# Patient Record
Sex: FEMALE | Race: WHITE | NOT HISPANIC OR LATINO | Employment: OTHER | URBAN - METROPOLITAN AREA
[De-identification: names, ages, dates, MRNs, and addresses within clinical notes are randomized per-mention and may not be internally consistent; named-entity substitution may affect disease eponyms.]

---

## 2016-08-09 LAB — HM COLONOSCOPY: NORMAL

## 2017-12-07 LAB — VITAMIN D, 1,25 (OH)2: 32 NG/ML (ref 30–100)

## 2017-12-08 LAB
KAPPA FREE LIGHT CHAINS: 29.5 MG/L (ref 3.3–19.4)
KAPPA/LAMBDA RATIO, S: 1.05 (ref 0.26–1.65)
LAMBDA FREE LIGHT CHAINS: 28.2 MG/L (ref 5.7–26.3)

## 2017-12-15 ENCOUNTER — TELEPHONE (OUTPATIENT)
Dept: INTERNAL MEDICINE | Facility: CLINIC | Age: 67
End: 2017-12-15

## 2017-12-15 DIAGNOSIS — N18.30 CHRONIC KIDNEY DISEASE, STAGE III (MODERATE): Primary | ICD-10-CM

## 2017-12-15 NOTE — TELEPHONE ENCOUNTER
Patient needs monthly H&H check per [facility] lab result. Entered into encounter as standing order, q 4 weeks x12 draws.  Please verify and sign order.  Thank you.

## 2018-01-07 PROBLEM — Z90.49 HISTORY OF CHOLECYSTECTOMY: Status: ACTIVE | Noted: 2018-01-07

## 2018-01-07 PROBLEM — N18.30 CHRONIC KIDNEY DISEASE, STAGE III (MODERATE): Chronic | Status: ACTIVE | Noted: 2018-01-07

## 2018-01-07 PROBLEM — Z85.44 HISTORY OF CANCER OF VULVA: Status: ACTIVE | Noted: 2018-01-07

## 2018-01-07 PROBLEM — I10 ESSENTIAL HYPERTENSION, BENIGN: Chronic | Status: ACTIVE | Noted: 2018-01-07

## 2018-01-07 PROBLEM — K52.9 INFLAMMATORY BOWEL DISEASE: Chronic | Status: ACTIVE | Noted: 2018-01-07

## 2018-01-07 PROBLEM — E11.42 DIABETIC POLYNEUROPATHY ASSOCIATED WITH TYPE 2 DIABETES MELLITUS: Chronic | Status: ACTIVE | Noted: 2018-01-07

## 2018-01-07 PROBLEM — D50.0 IRON DEFICIENCY ANEMIA DUE TO CHRONIC BLOOD LOSS: Chronic | Status: ACTIVE | Noted: 2018-01-07

## 2018-01-07 PROBLEM — E55.9 VITAMIN D DEFICIENCY: Status: ACTIVE | Noted: 2018-01-07

## 2018-01-08 ENCOUNTER — OFFICE VISIT (OUTPATIENT)
Dept: INTERNAL MEDICINE | Facility: CLINIC | Age: 68
End: 2018-01-08
Payer: MEDICARE

## 2018-01-08 VITALS
HEIGHT: 67 IN | RESPIRATION RATE: 16 BRPM | SYSTOLIC BLOOD PRESSURE: 118 MMHG | WEIGHT: 184.63 LBS | HEART RATE: 98 BPM | DIASTOLIC BLOOD PRESSURE: 60 MMHG | BODY MASS INDEX: 28.98 KG/M2 | OXYGEN SATURATION: 98 % | TEMPERATURE: 98 F

## 2018-01-08 DIAGNOSIS — Z11.59 NEED FOR HEPATITIS C SCREENING TEST: ICD-10-CM

## 2018-01-08 DIAGNOSIS — I10 ESSENTIAL HYPERTENSION, BENIGN: Chronic | ICD-10-CM

## 2018-01-08 DIAGNOSIS — E11.42 DIABETIC POLYNEUROPATHY ASSOCIATED WITH TYPE 2 DIABETES MELLITUS: Chronic | ICD-10-CM

## 2018-01-08 DIAGNOSIS — Z90.49 HISTORY OF CHOLECYSTECTOMY: ICD-10-CM

## 2018-01-08 DIAGNOSIS — D50.0 IRON DEFICIENCY ANEMIA DUE TO CHRONIC BLOOD LOSS: Chronic | ICD-10-CM

## 2018-01-08 DIAGNOSIS — R05.8 COUGH PRODUCTIVE OF PURULENT SPUTUM: ICD-10-CM

## 2018-01-08 DIAGNOSIS — Z85.44 HISTORY OF CANCER OF VULVA: ICD-10-CM

## 2018-01-08 DIAGNOSIS — K52.9 INFLAMMATORY BOWEL DISEASE: Chronic | ICD-10-CM

## 2018-01-08 DIAGNOSIS — E55.9 VITAMIN D DEFICIENCY: ICD-10-CM

## 2018-01-08 DIAGNOSIS — N18.5 CHRONIC RENAL FAILURE, STAGE 5: Primary | ICD-10-CM

## 2018-01-08 PROCEDURE — 99214 OFFICE O/P EST MOD 30 MIN: CPT | Mod: ,,, | Performed by: INTERNAL MEDICINE

## 2018-01-08 RX ORDER — GABAPENTIN 400 MG/1
800 CAPSULE ORAL NIGHTLY
COMMUNITY
End: 2018-10-31 | Stop reason: SDUPTHER

## 2018-01-08 RX ORDER — DILTIAZEM HYDROCHLORIDE 240 MG/1
240 CAPSULE, COATED, EXTENDED RELEASE ORAL DAILY
COMMUNITY
End: 2018-01-08 | Stop reason: SDUPTHER

## 2018-01-08 RX ORDER — TEDUGLUTIDE 5 MG/.5ML
INJECTION, POWDER, LYOPHILIZED, FOR SOLUTION SUBCUTANEOUS
COMMUNITY
End: 2019-07-10

## 2018-01-08 RX ORDER — BENZONATATE 100 MG/1
100 CAPSULE ORAL 3 TIMES DAILY PRN
Qty: 30 CAPSULE | Refills: 1 | Status: SHIPPED | OUTPATIENT
Start: 2018-01-08 | End: 2018-01-08 | Stop reason: SDUPTHER

## 2018-01-08 RX ORDER — VITAMIN E 268 MG
400 CAPSULE ORAL 2 TIMES DAILY
COMMUNITY
End: 2018-10-31

## 2018-01-08 RX ORDER — VITAMIN A 3000 MCG
10000 CAPSULE ORAL DAILY
COMMUNITY
End: 2018-10-31

## 2018-01-08 RX ORDER — DILTIAZEM HYDROCHLORIDE 240 MG/1
240 CAPSULE, COATED, EXTENDED RELEASE ORAL DAILY
Qty: 90 CAPSULE | Refills: 0 | Status: SHIPPED | OUTPATIENT
Start: 2018-01-08 | End: 2018-01-08 | Stop reason: SDUPTHER

## 2018-01-08 RX ORDER — BENZONATATE 100 MG/1
100 CAPSULE ORAL 3 TIMES DAILY PRN
Qty: 30 CAPSULE | Refills: 1 | Status: SHIPPED | OUTPATIENT
Start: 2018-01-08 | End: 2018-01-18

## 2018-01-08 RX ORDER — DILTIAZEM HYDROCHLORIDE 240 MG/1
240 CAPSULE, COATED, EXTENDED RELEASE ORAL DAILY
Qty: 90 CAPSULE | Refills: 0 | Status: SHIPPED | OUTPATIENT
Start: 2018-01-08 | End: 2018-08-29 | Stop reason: SDUPTHER

## 2018-01-08 RX ORDER — CHOLECALCIFEROL (VITAMIN D3) 1250 MCG
1 CAPSULE ORAL 3 TIMES DAILY
Refills: 1 | COMMUNITY
Start: 2017-10-05 | End: 2018-01-23 | Stop reason: SDUPTHER

## 2018-01-08 RX ORDER — LEVOTHYROXINE SODIUM 88 UG/1
88 TABLET ORAL DAILY
COMMUNITY
Start: 2017-12-14 | End: 2018-08-30 | Stop reason: SDUPTHER

## 2018-01-08 RX ORDER — CEPHALEXIN 500 MG/1
500 CAPSULE ORAL 4 TIMES DAILY
Qty: 40 CAPSULE | Refills: 1 | Status: SHIPPED | OUTPATIENT
Start: 2018-01-08 | End: 2018-04-11

## 2018-01-08 RX ORDER — DICYCLOMINE HYDROCHLORIDE 10 MG/1
10 CAPSULE ORAL 3 TIMES DAILY
COMMUNITY
End: 2018-08-30 | Stop reason: SDUPTHER

## 2018-01-22 LAB
ALBUMIN SERPL-MCNC: 4 G/DL (ref 3.1–4.7)
ALP SERPL-CCNC: 118 IU/L (ref 40–104)
ALT (SGPT): 12 IU/L (ref 3–33)
AST SERPL-CCNC: 20 IU/L (ref 10–40)
BILIRUB SERPL-MCNC: 0.3 MG/DL (ref 0.3–1)
BUN SERPL-MCNC: 36 MG/DL (ref 8–20)
CALCIUM SERPL-MCNC: 8.7 MG/DL (ref 7.7–10.4)
CHLORIDE: 113 MMOL/L (ref 98–110)
CO2 SERPL-SCNC: 20.3 MMOL/L (ref 22.8–31.6)
CREATININE: 2.68 MG/DL (ref 0.6–1.4)
GLUCOSE: 87 MG/DL (ref 70–99)
PHOSPHATE FLD-MCNC: 4.8 MG/DL (ref 2.5–4.9)
POTASSIUM SERPL-SCNC: 4.1 MMOL/L (ref 3.5–5)
PROT SERPL-MCNC: 6.9 G/DL (ref 6–8.2)
SODIUM: 141 MMOL/L (ref 134–144)
VITAMIN D, 1,25 (OH)2: 27 NG/ML (ref 30–100)

## 2018-01-23 DIAGNOSIS — E55.9 VITAMIN D DEFICIENCY: Primary | ICD-10-CM

## 2018-01-23 LAB — HCV AB SERPL QL IA: <0.1 S/CO RATIO (ref 0–0.9)

## 2018-01-23 RX ORDER — ERGOCALCIFEROL 1.25 MG/1
50000 CAPSULE ORAL
Qty: 12 CAPSULE | Refills: 0 | Status: SHIPPED | OUTPATIENT
Start: 2018-01-23 | End: 2018-10-02 | Stop reason: SDUPTHER

## 2018-02-07 ENCOUNTER — TELEPHONE (OUTPATIENT)
Dept: INTERNAL MEDICINE | Facility: CLINIC | Age: 68
End: 2018-02-07

## 2018-02-07 NOTE — TELEPHONE ENCOUNTER
----- Message from April Horton MD sent at 1/23/2018  7:34 AM CST -----  -IMPORTANT!!!!!!!!!!!    PT CANNOT TAKE THE AMOUNT OF GABAPENTIN SHE IS TAKING--HER RENAL FUNCTION CANNOT TOLERATE THAT DOSE-SHE MUST DROP THE DOSE  MG HS-NO MORE AND WE MUST FOLLOW RENAL FUNCTION WEEKLY-THERE HAS BEEN A SIG DECLINE IN RENAL FUNCTION SINCE November-ALSO, A RACQUEL WAS SENT TO TELL HER TO TAKE VITAMIN D ONCE A WEEK ONLY-THE RECORD REFLECTS TID WHICH I KNOW IS AN ERROR-SHE IS LOW IN VITAMIN D BUT TOO MUCH IS BAD FOR THE KIDNEYS ALSO!!!!!!!!!!!!!!!!!!!!!

## 2018-03-26 ENCOUNTER — TELEPHONE (OUTPATIENT)
Dept: INTERNAL MEDICINE | Facility: CLINIC | Age: 68
End: 2018-03-26

## 2018-03-26 DIAGNOSIS — N18.30 CHRONIC KIDNEY DISEASE, STAGE III (MODERATE): ICD-10-CM

## 2018-03-26 DIAGNOSIS — D50.0 IRON DEFICIENCY ANEMIA DUE TO CHRONIC BLOOD LOSS: ICD-10-CM

## 2018-03-26 DIAGNOSIS — E55.9 VITAMIN D DEFICIENCY: ICD-10-CM

## 2018-03-26 DIAGNOSIS — I10 ESSENTIAL HYPERTENSION, BENIGN: ICD-10-CM

## 2018-03-26 DIAGNOSIS — E11.42 DIABETIC POLYNEUROPATHY ASSOCIATED WITH TYPE 2 DIABETES MELLITUS: Primary | ICD-10-CM

## 2018-03-26 DIAGNOSIS — E03.9 ACQUIRED HYPOTHYROIDISM: ICD-10-CM

## 2018-03-26 DIAGNOSIS — K52.9 INFLAMMATORY BOWEL DISEASE: ICD-10-CM

## 2018-03-26 NOTE — TELEPHONE ENCOUNTER
----- Message from Zahira Bryan sent at 3/26/2018 10:35 AM CDT -----  Contact: self  Pt has an appt on 4/11/18 and would like to have some labs done before she comes.  Has not had labs done in a while.    ordered

## 2018-03-29 LAB
25(OH)D3+25(OH)D2 SERPL-MCNC: 65.9 NG/ML (ref 30–100)
ALBUMIN SERPL-MCNC: 4.1 G/DL (ref 3.6–4.8)
ALBUMIN/GLOB SERPL: 1.6 {RATIO} (ref 1.2–2.2)
ALP SERPL-CCNC: 138 IU/L (ref 39–117)
ALT SERPL-CCNC: 8 IU/L (ref 0–32)
AMBIG ABBREV CMP 14 DEFAULT: NORMAL
AMBIG ABBREV LP DEFAULT: NORMAL
AST SERPL-CCNC: 16 IU/L (ref 0–40)
BILIRUB SERPL-MCNC: 0.3 MG/DL (ref 0–1.2)
BUN SERPL-MCNC: 37 MG/DL (ref 8–27)
BUN/CREAT SERPL: 12 (ref 12–28)
CALCIUM SERPL-MCNC: 9.4 MG/DL (ref 8.7–10.3)
CHLORIDE SERPL-SCNC: 104 MMOL/L (ref 96–106)
CHOLEST SERPL-MCNC: 159 MG/DL (ref 100–199)
CO2 SERPL-SCNC: 18 MMOL/L (ref 18–29)
CREAT SERPL-MCNC: 3.19 MG/DL (ref 0.57–1)
FERRITIN SERPL-MCNC: 75 NG/ML (ref 15–150)
GFR SERPLBLD CREATININE-BSD FMLA CKD-EPI: 14 ML/MIN/1.73
GFR SERPLBLD CREATININE-BSD FMLA CKD-EPI: 17 ML/MIN/1.73
GLOBULIN SER CALC-MCNC: 2.6 G/DL (ref 1.5–4.5)
GLUCOSE SERPL-MCNC: 103 MG/DL (ref 65–99)
HBA1C MFR BLD: 5.3 % (ref 4.8–5.6)
HDLC SERPL-MCNC: 59 MG/DL
IRON SATN MFR SERPL: 14 % (ref 15–55)
IRON SERPL-MCNC: 46 UG/DL (ref 27–139)
LDLC SERPL CALC-MCNC: 73 MG/DL (ref 0–99)
POTASSIUM SERPL-SCNC: 5.3 MMOL/L (ref 3.5–5.2)
PROT SERPL-MCNC: 6.7 G/DL (ref 6–8.5)
SODIUM SERPL-SCNC: 139 MMOL/L (ref 134–144)
TIBC SERPL-MCNC: 340 UG/DL (ref 250–450)
TRIGL SERPL-MCNC: 134 MG/DL (ref 0–149)
TSH SERPL DL<=0.005 MIU/L-ACNC: 0.99 UIU/ML (ref 0.45–4.5)
UIBC SERPL-MCNC: 294 UG/DL (ref 118–369)
VLDLC SERPL CALC-MCNC: 27 MG/DL (ref 5–40)

## 2018-04-11 ENCOUNTER — OFFICE VISIT (OUTPATIENT)
Dept: INTERNAL MEDICINE | Facility: CLINIC | Age: 68
End: 2018-04-11
Payer: MEDICARE

## 2018-04-11 VITALS
SYSTOLIC BLOOD PRESSURE: 110 MMHG | RESPIRATION RATE: 18 BRPM | WEIGHT: 183 LBS | HEIGHT: 67 IN | BODY MASS INDEX: 28.72 KG/M2 | OXYGEN SATURATION: 97 % | HEART RATE: 88 BPM | DIASTOLIC BLOOD PRESSURE: 74 MMHG | TEMPERATURE: 98 F

## 2018-04-11 DIAGNOSIS — I10 ESSENTIAL HYPERTENSION, BENIGN: Chronic | ICD-10-CM

## 2018-04-11 DIAGNOSIS — R53.83 FATIGUE, UNSPECIFIED TYPE: Primary | ICD-10-CM

## 2018-04-11 DIAGNOSIS — R11.0 NAUSEA: ICD-10-CM

## 2018-04-11 DIAGNOSIS — K52.9 INFLAMMATORY BOWEL DISEASE: Chronic | ICD-10-CM

## 2018-04-11 DIAGNOSIS — D50.8 IRON DEFICIENCY ANEMIA SECONDARY TO INADEQUATE DIETARY IRON INTAKE: ICD-10-CM

## 2018-04-11 DIAGNOSIS — R77.8 OTHER SPECIFIED ABNORMALITIES OF PLASMA PROTEINS: ICD-10-CM

## 2018-04-11 DIAGNOSIS — E11.42 DIABETIC POLYNEUROPATHY ASSOCIATED WITH TYPE 2 DIABETES MELLITUS: Chronic | ICD-10-CM

## 2018-04-11 DIAGNOSIS — I95.1 ORTHOSTATIC HYPOTENSION: ICD-10-CM

## 2018-04-11 DIAGNOSIS — R25.2 SPASM: ICD-10-CM

## 2018-04-11 DIAGNOSIS — R53.1 WEAKNESS: ICD-10-CM

## 2018-04-11 LAB — HBA1C MFR BLD: 5.6 %

## 2018-04-11 PROCEDURE — 3044F HG A1C LEVEL LT 7.0%: CPT | Mod: ,,, | Performed by: INTERNAL MEDICINE

## 2018-04-11 PROCEDURE — 3078F DIAST BP <80 MM HG: CPT | Mod: ,,, | Performed by: INTERNAL MEDICINE

## 2018-04-11 PROCEDURE — 83036 HEMOGLOBIN GLYCOSYLATED A1C: CPT | Mod: QW,,, | Performed by: INTERNAL MEDICINE

## 2018-04-11 PROCEDURE — 3074F SYST BP LT 130 MM HG: CPT | Mod: ,,, | Performed by: INTERNAL MEDICINE

## 2018-04-11 PROCEDURE — 99214 OFFICE O/P EST MOD 30 MIN: CPT | Mod: ,,, | Performed by: INTERNAL MEDICINE

## 2018-04-11 RX ORDER — PROMETHAZINE HYDROCHLORIDE 25 MG/1
25 TABLET ORAL DAILY PRN
COMMUNITY
End: 2019-01-07 | Stop reason: SDUPTHER

## 2018-04-11 RX ORDER — DEXTROSE 4 G
TABLET,CHEWABLE ORAL
Qty: 1 EACH | Refills: 0 | Status: SHIPPED | OUTPATIENT
Start: 2018-04-11 | End: 2019-08-05

## 2018-04-11 RX ORDER — BLOOD-GLUCOSE CONTROL, NORMAL
EACH MISCELLANEOUS 3 TIMES DAILY
COMMUNITY
End: 2019-08-05

## 2018-04-11 RX ORDER — CYCLOBENZAPRINE HCL 10 MG
10 TABLET ORAL 2 TIMES DAILY
COMMUNITY
End: 2018-08-30 | Stop reason: SDUPTHER

## 2018-04-11 RX ORDER — POTASSIUM CHLORIDE 750 MG/1
10 TABLET, EXTENDED RELEASE ORAL ONCE
COMMUNITY
End: 2018-08-30 | Stop reason: SDUPTHER

## 2018-04-11 RX ORDER — FERROUS SULFATE 325(65) MG
325 TABLET ORAL
COMMUNITY
End: 2018-05-23

## 2018-04-11 NOTE — PROGRESS NOTES
"  SUBJECTIVE:    Patient ID: Shara Hutchins is a 68 y.o. female.    Chief Complaint: Fatigue; Weight Gain; Nausea (very easily); and Results (labs in epic)    HPI    Patient comes for recheck-she is "totally exhausted"-yesterday she got nauseated smelling eggs-went to a meeting and two women ran up to her -she was shaking-face was gray-no one could understand what she was saying-she felt disoriented-she drank some cold water-pt says she drinks constantly-90 % of the time it is water-she was totally dehydrated-drank a whole gallon of water in an hour-she was not in control of herself-after 2/3 of a gallon she felt better!!!-she drove herself home-this has happened two or three times-usually lays down until it passes-blood sugar is going "viktoriya high" now-last two weeks--hx Diabetes with neuropathy--no pancreatitis but strong family hx of pancreatitis--    Since her last visit she has seen Dr Dietz -her GFR is down to 14-she is going to have some labs for renal again next week-[facility]    We found normal serum protein level  and increased  kappa and lambda chains--     Diabetes-200 one night-in am awakening 130--A1C is 5.6  Past Medical History:   Diagnosis Date    Chronic kidney disease, stage III (moderate) 1/7/2018     Social History     Social History    Marital status:      Spouse name: N/A    Number of children: N/A    Years of education: N/A     Occupational History    Not on file.     Social History Main Topics    Smoking status: Former Smoker     Packs/day: 1.00     Years: 33.00     Types: Cigarettes     Quit date: 7/22/2000    Smokeless tobacco: Never Used    Alcohol use No    Drug use: No    Sexual activity: Not on file     Other Topics Concern    Not on file     Social History Narrative    No narrative on file     Past Surgical History:   Procedure Laterality Date    ABDOMINAL SURGERY      BACK SURGERY      CERVICAL FUSION      x 4    cervix repair      CHOLECYSTECTOMY      " HYSTERECTOMY      LUMBAR LAMINECTOMY      NECK SURGERY      SHOULDER SURGERY      vulva cancer       Family History   Problem Relation Age of Onset    Heart disease Mother     Hypertension Mother     Cancer Mother      lung    Heart disease Father     No Known Problems Sister      2    No Known Problems Daughter        Review of Systems   Constitutional: Positive for appetite change (significantly reduced) and unexpected weight change (gaining weight despite reduction in appetite). Negative for chills, diaphoresis, fatigue and fever.   HENT: Negative for congestion, ear pain, hearing loss, nosebleeds, postnasal drip, sinus pain, sinus pressure, sneezing, sore throat, tinnitus, trouble swallowing and voice change.    Eyes: Negative for photophobia, pain, itching and visual disturbance.   Respiratory: Negative for apnea, cough (productive cough of colored mucous), chest tightness, shortness of breath, wheezing and stridor.    Cardiovascular: Negative for chest pain, palpitations and leg swelling.   Gastrointestinal: Negative for abdominal distention, abdominal pain, blood in stool, constipation, diarrhea, nausea (constantly) and vomiting.   Endocrine: Negative for cold intolerance, heat intolerance, polydipsia and polyuria.   Genitourinary: Negative for difficulty urinating, dyspareunia, dysuria, flank pain, frequency, hematuria, menstrual problem, pelvic pain, urgency, vaginal discharge and vaginal pain.   Musculoskeletal: Negative for arthralgias, back pain, joint swelling, myalgias, neck pain and neck stiffness.   Skin: Negative for pallor.        Mild dehydration   Allergic/Immunologic: Negative for environmental allergies and food allergies.   Neurological: Positive for weakness (generalized with near faints). Negative for dizziness, tremors, speech difficulty, light-headedness and numbness (constantly).   Hematological: Does not bruise/bleed easily.   Psychiatric/Behavioral: Negative for agitation,  confusion, decreased concentration, sleep disturbance and suicidal ideas. The patient is not nervous/anxious.           Objective:      Physical Exam   Constitutional: She is oriented to person, place, and time. She appears well-developed and well-nourished. She is cooperative. No distress.   Looks fatigued   HENT:   Head: Normocephalic and atraumatic.   Right Ear: Tympanic membrane normal.   Left Ear: Tympanic membrane normal.   Mouth/Throat: Uvula is midline and mucous membranes are normal.   Eyes: Conjunctivae, EOM and lids are normal. Pupils are equal, round, and reactive to light. Right pupil is round and reactive. Left pupil is round and reactive.   Neck: Trachea normal and normal range of motion. Neck supple. No JVD present. No thyromegaly present.   Cardiovascular: Normal rate, regular rhythm, normal heart sounds and intact distal pulses.    Pulmonary/Chest: Effort normal and breath sounds normal. No tachypnea. No respiratory distress.   Abdominal: Soft. Bowel sounds are normal. There is no tenderness (epigastrium is tender and BS are louder and more constant in that area).   Musculoskeletal: Normal range of motion.   Lymphadenopathy:     She has no cervical adenopathy.   Neurological: She is alert and oriented to person, place, and time. She has normal strength.   Skin: Skin is warm and dry. No rash noted. There is pallor (extreme pallor of skin but not of lowere lids).   Psychiatric: She has a normal mood and affect. Her speech is normal.   Depressed affect   Nursing note and vitals reviewed.          Assessment:       1. Fatigue, unspecified type    2. Orthostatic hypotension    3. Weakness    4. Essential hypertension, benign    5. Inflammatory bowel disease    6. Diabetic polyneuropathy associated with type 2 diabetes mellitus    7. Other specified abnormalities of plasma proteins    8. Nausea    9. Spasm    10. Iron deficiency anemia secondary to inadequate dietary iron intake         Plan:            Fatigue, unspecified type  -     Ambulatory referral to Hematology / Oncology  -     Amylase; Future; Expected date: 04/11/2018  -     Lipase; Future; Expected date: 04/11/2018  -     Comprehensive metabolic panel; Future; Expected date: 04/11/2018  -     Lactic acid, plasma; Future; Expected date: 04/11/2018    Orthostatic hypotension    Weakness  -     Ambulatory referral to Hematology / Oncology  -     Lipase; Future; Expected date: 04/11/2018  -     Comprehensive metabolic panel; Future; Expected date: 04/11/2018  -     Lactic acid, plasma; Future; Expected date: 04/11/2018    Essential hypertension, benign  -     Microalbumin/creatinine urine ratio  -     Comprehensive metabolic panel; Future; Expected date: 04/11/2018    Inflammatory bowel disease  -     Comprehensive metabolic panel; Future; Expected date: 04/11/2018    Diabetic polyneuropathy associated with type 2 diabetes mellitus  -     Hemoglobin A1c; Future; Expected date: 04/12/2018  -     Microalbumin/creatinine urine ratio  -     Lactic acid, plasma; Future; Expected date: 04/11/2018    Other specified abnormalities of plasma proteins  -     Ambulatory referral to Hematology / Oncology  -     Comprehensive metabolic panel; Future; Expected date: 04/11/2018  -     Lactic acid, plasma; Future; Expected date: 04/11/2018    Nausea  -     Amylase; Future; Expected date: 04/11/2018  -     Lipase; Future; Expected date: 04/11/2018  -     Comprehensive metabolic panel; Future; Expected date: 04/11/2018  -     Lactic acid, plasma; Future; Expected date: 04/11/2018    Spasm    Iron deficiency anemia secondary to inadequate dietary iron intake

## 2018-04-11 NOTE — PATIENT INSTRUCTIONS
We will wait for the labs Renal and I are getting next week in order to determine what to do re the orthostatic hypotension and whatever direction we need to take for this patient-    Await Heme eval of kappa and lambda chain proteins

## 2018-04-23 LAB
ALBUMIN SERPL-MCNC: 4 G/DL (ref 3.6–4.8)
ALBUMIN/GLOB SERPL: 1.7 {RATIO} (ref 1.2–2.2)
ALP SERPL-CCNC: 114 IU/L (ref 39–117)
ALT SERPL-CCNC: 9 IU/L (ref 0–32)
AMBIG ABBREV CMP 14 DEFAULT: NORMAL
AMYLASE SERPL-CCNC: 103 U/L (ref 31–124)
AST SERPL-CCNC: 13 IU/L (ref 0–40)
BILIRUB SERPL-MCNC: 0.2 MG/DL (ref 0–1.2)
BUN SERPL-MCNC: 38 MG/DL (ref 8–27)
BUN/CREAT SERPL: 12 (ref 12–28)
CALCIUM SERPL-MCNC: 8.5 MG/DL (ref 8.7–10.3)
CHLORIDE SERPL-SCNC: 108 MMOL/L (ref 96–106)
CO2 SERPL-SCNC: 16 MMOL/L (ref 18–29)
CREAT SERPL-MCNC: 3.09 MG/DL (ref 0.57–1)
GFR SERPLBLD CREATININE-BSD FMLA CKD-EPI: 15 ML/MIN/1.73
GFR SERPLBLD CREATININE-BSD FMLA CKD-EPI: 17 ML/MIN/1.73
GLOBULIN SER CALC-MCNC: 2.3 G/DL (ref 1.5–4.5)
GLUCOSE SERPL-MCNC: 96 MG/DL (ref 65–99)
HBA1C MFR BLD: 5.3 % (ref 4.8–5.6)
LACTATE SERPL-MCNC: 6.1 MG/DL (ref 4.8–25.7)
LIPASE SERPL-CCNC: 41 U/L (ref 14–72)
POTASSIUM SERPL-SCNC: 5 MMOL/L (ref 3.5–5.2)
PROT SERPL-MCNC: 6.3 G/DL (ref 6–8.5)
SODIUM SERPL-SCNC: 141 MMOL/L (ref 134–144)

## 2018-04-30 ENCOUNTER — TELEPHONE (OUTPATIENT)
Dept: INTERNAL MEDICINE | Facility: CLINIC | Age: 68
End: 2018-04-30

## 2018-04-30 NOTE — TELEPHONE ENCOUNTER
----- Message from Jackie Acosta sent at 4/26/2018  5:17 PM CDT -----  Contact: Bindu  From [facility]:  Kidney Dr wants her to take B12. He wants her to have that done in your office. Also he cut her BP med in half. Please call.

## 2018-04-30 NOTE — TELEPHONE ENCOUNTER
----- Message from April Horton MD sent at 4/29/2018  9:38 PM CDT -----  Please call the patient regarding her abnormal result.-WORSENING RENAL FUNTION WITH SOME METABOLIC CONSEQUENCES-WHO WD SHE LIKE TO SEE

## 2018-04-30 NOTE — TELEPHONE ENCOUNTER
Patient is already seeing a doctor for her renal function and will have the kidney doctor fax his last office visit to us so that we can get the b-12 figured out

## 2018-04-30 NOTE — TELEPHONE ENCOUNTER
Kidney doctor stated he wanted 1 mcg once a day for 7 days then 1 mcg monthly 6 months. Patient stated that we need to regulate it.    BP medication is cut in half.    Gave patient a message to f/u with the referral with Dr Frank.

## 2018-05-08 DIAGNOSIS — E53.8 VITAMIN B12 DEFICIENCY: Primary | ICD-10-CM

## 2018-05-08 NOTE — TELEPHONE ENCOUNTER
Spoke to Patient.  Lab is under media tab as Media Information  File Link     Scan on 4/26/2018  4:36 PM : LAB, [facility], (CMP, FE, VITAMIN B12) 04/20/2018Scan on 4/26/2018  4:36 PM : [facility], [facility], (CMP, FE, VITAMIN B12) 04/20/2018   Key Information     Document ID File Type Document Type Description   [id number] Image Outside Lab [facility], (CMP, FE, VITAMIN B12) 04/20/2018   Import Information     Attached At Date Time User Dept   Patient Level 4/26/2018  4:36 PM       B12 level was below 150.  If prescribed, patient would like syringes sent to pharmacy so she can self-administer.  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

## 2018-05-08 NOTE — TELEPHONE ENCOUNTER
----- Message from Jackie Acosta sent at 5/8/2018  3:57 PM CDT -----  Contact: Shara Dietz's office was supposed to have sent over labs & orders for B12 over 2 weeks ago, patient is frustrated and wants someone to call her asap

## 2018-05-09 RX ORDER — CYANOCOBALAMIN 1000 UG/ML
2000 INJECTION, SOLUTION INTRAMUSCULAR; SUBCUTANEOUS WEEKLY
Qty: 24 ML | Refills: 0 | Status: SHIPPED | OUTPATIENT
Start: 2018-05-09 | End: 2018-08-06 | Stop reason: SDUPTHER

## 2018-05-09 NOTE — TELEPHONE ENCOUNTER
Ok-pend order-also she is to give herself 2cc weekly-also ck monthly B12-also print labs so I can discuss with her andreea

## 2018-05-10 ENCOUNTER — TELEPHONE (OUTPATIENT)
Dept: INTERNAL MEDICINE | Facility: CLINIC | Age: 68
End: 2018-05-10

## 2018-05-10 NOTE — TELEPHONE ENCOUNTER
----- Message from April Horton MD sent at 5/10/2018  7:53 AM CDT -----  Test results-potassium 5.3-stp potassium and recheck it in two weeks-Creat 3.19-remnains iron deficient

## 2018-05-23 ENCOUNTER — OFFICE VISIT (OUTPATIENT)
Dept: INTERNAL MEDICINE | Facility: CLINIC | Age: 68
End: 2018-05-23
Payer: MEDICARE

## 2018-05-23 VITALS
HEART RATE: 66 BPM | RESPIRATION RATE: 18 BRPM | TEMPERATURE: 98 F | DIASTOLIC BLOOD PRESSURE: 74 MMHG | BODY MASS INDEX: 29.66 KG/M2 | OXYGEN SATURATION: 97 % | HEIGHT: 67 IN | SYSTOLIC BLOOD PRESSURE: 122 MMHG | WEIGHT: 189 LBS

## 2018-05-23 DIAGNOSIS — E11.22 DM TYPE 2 CAUSING CKD STAGE 5: ICD-10-CM

## 2018-05-23 DIAGNOSIS — E87.20 COMPENSATED METABOLIC ACIDOSIS: ICD-10-CM

## 2018-05-23 DIAGNOSIS — J18.9 PNEUMONIA OF LEFT LOWER LOBE DUE TO INFECTIOUS ORGANISM: ICD-10-CM

## 2018-05-23 DIAGNOSIS — R05.8 PRODUCTIVE COUGH: ICD-10-CM

## 2018-05-23 DIAGNOSIS — N18.5 DM TYPE 2 CAUSING CKD STAGE 5: ICD-10-CM

## 2018-05-23 DIAGNOSIS — J01.40 SUBACUTE PANSINUSITIS: ICD-10-CM

## 2018-05-23 DIAGNOSIS — R25.2 LEG CRAMPS: Primary | ICD-10-CM

## 2018-05-23 PROCEDURE — 3044F HG A1C LEVEL LT 7.0%: CPT | Mod: ,,, | Performed by: INTERNAL MEDICINE

## 2018-05-23 PROCEDURE — 99214 OFFICE O/P EST MOD 30 MIN: CPT | Mod: ,,, | Performed by: INTERNAL MEDICINE

## 2018-05-23 PROCEDURE — 3074F SYST BP LT 130 MM HG: CPT | Mod: ,,, | Performed by: INTERNAL MEDICINE

## 2018-05-23 PROCEDURE — 1111F DSCHRG MED/CURRENT MED MERGE: CPT | Mod: ,,, | Performed by: INTERNAL MEDICINE

## 2018-05-23 PROCEDURE — 3078F DIAST BP <80 MM HG: CPT | Mod: ,,, | Performed by: INTERNAL MEDICINE

## 2018-05-23 RX ORDER — LANOLIN ALCOHOL/MO/W.PET/CERES
400 CREAM (GRAM) TOPICAL DAILY
Qty: 90 TABLET | Refills: 1 | COMMUNITY
Start: 2018-05-23 | End: 2018-10-02 | Stop reason: SDUPTHER

## 2018-05-23 RX ORDER — PREDNISONE 10 MG/1
10 TABLET ORAL
COMMUNITY
End: 2018-10-02

## 2018-05-23 RX ORDER — FLUCONAZOLE 200 MG/1
100 TABLET ORAL DAILY
COMMUNITY
End: 2019-01-07

## 2018-05-23 RX ORDER — LANOLIN ALCOHOL/MO/W.PET/CERES
400 CREAM (GRAM) TOPICAL DAILY
COMMUNITY
End: 2018-05-23 | Stop reason: SDUPTHER

## 2018-05-23 RX ORDER — HYDROCODONE POLISTIREX AND CHLORPHENIRAMINE POLISTIREX 10; 8 MG/5ML; MG/5ML
5 SUSPENSION, EXTENDED RELEASE ORAL EVERY 12 HOURS PRN
Qty: 100 ML | Refills: 0 | Status: SHIPPED | OUTPATIENT
Start: 2018-05-23 | End: 2018-10-02

## 2018-05-23 NOTE — PROGRESS NOTES
SUBJECTIVE:    Patient ID: Shara Hutchins is a 68 y.o. female.    Chief Complaint: Follow-up ([facility]) and Chronic Kidney Disease    HPI     Hospitalized with fever and pneumonia and shortness of breath-CT showed 3 areas of consolidation and of course the renal disease-continues with sinus congestion and problems sleeping-it was felt she may need oxygen but she did not have to-so much drainage she can not lay down-Pulmonary wanted her discharged on robitussin with codeine    Diabetes-for now because of steroids all is still well-AC was 5.6    Diarrhea-finally with Nancy ex she is better    (MED RECONCILIATION) BP Rx decreased by half-see Rx changes    B is really helping with her energy--    Past Medical History:   Diagnosis Date    Chronic kidney disease, stage III (moderate) 1/7/2018    Diabetes mellitus, type 2     Fatigue     Hypertension      Social History     Social History    Marital status:      Spouse name: N/A    Number of children: N/A    Years of education: N/A     Occupational History    Not on file.     Social History Main Topics    Smoking status: Former Smoker     Packs/day: 1.00     Years: 33.00     Types: Cigarettes     Quit date: 7/22/2000    Smokeless tobacco: Never Used    Alcohol use No    Drug use: No    Sexual activity: Not on file     Other Topics Concern    Not on file     Social History Narrative    No narrative on file     Past Surgical History:   Procedure Laterality Date    ABDOMINAL SURGERY      BACK SURGERY      CERVICAL FUSION      x 4    cervix repair      CHOLECYSTECTOMY      HYSTERECTOMY      LUMBAR LAMINECTOMY      NECK SURGERY      SHOULDER SURGERY      vulva cancer       Family History   Problem Relation Age of Onset    Heart disease Mother     Hypertension Mother     Cancer Mother         lung    Heart disease Father     No Known Problems Sister         2    No Known Problems Daughter        Review of Systems   Constitutional: Negative for  appetite change (increased with steroids), chills, diaphoresis, fatigue, fever and unexpected weight change.   HENT: Negative for congestion (4 + sinus congestion), ear pain, hearing loss, nosebleeds, postnasal drip, sinus pain, sinus pressure, sneezing, sore throat, tinnitus, trouble swallowing and voice change.    Eyes: Negative for photophobia, pain, itching and visual disturbance.   Respiratory: Positive for shortness of breath (due to congestion). Negative for apnea, cough, chest tightness, wheezing and stridor.    Cardiovascular: Negative for chest pain, palpitations and leg swelling.   Gastrointestinal: Negative for abdominal distention, abdominal pain, blood in stool, constipation, diarrhea, nausea and vomiting.   Endocrine: Negative for cold intolerance, heat intolerance, polydipsia and polyuria.   Genitourinary: Negative for difficulty urinating, dyspareunia, dysuria, flank pain, frequency, hematuria, menstrual problem, pelvic pain, urgency, vaginal discharge and vaginal pain.   Musculoskeletal: Negative for arthralgias, back pain, joint swelling, myalgias (muscle spasms), neck pain and neck stiffness.   Skin: Negative for pallor.   Allergic/Immunologic: Negative for environmental allergies and food allergies.   Neurological: Positive for headaches (with sinuses but they are not a problem at this time). Negative for dizziness, tremors, speech difficulty, weakness, light-headedness and numbness.   Hematological: Does not bruise/bleed easily.   Psychiatric/Behavioral: Negative for agitation, confusion, decreased concentration, sleep disturbance (due to cough) and suicidal ideas. The patient is not nervous/anxious.         Protective Sensation (aw/ 10 gram monofilament):  Right: Intact  Left: Intact    Visual Inspection:  Normal -  Bilateral    Pedal Pulses:   Right: Present  Left: Present    Posterior tibialis:   Right:Present  Left: Present    Objective:      Physical Exam   Constitutional: She is oriented  to person, place, and time. She appears well-developed and well-nourished. She is cooperative. No distress.   HENT:   Head: Normocephalic and atraumatic.   Right Ear: Tympanic membrane normal.   Left Ear: Tympanic membrane normal.   Nose: Nose normal.   Mouth/Throat: Uvula is midline, oropharynx is clear and moist and mucous membranes are normal.   Eyes: Conjunctivae, EOM and lids are normal. Pupils are equal, round, and reactive to light. Right pupil is round and reactive. Left pupil is round and reactive.   Neck: Trachea normal and normal range of motion. Neck supple. No JVD present. No thyromegaly present.   Cardiovascular: Normal rate, regular rhythm, normal heart sounds and intact distal pulses.    Pulmonary/Chest: Effort normal and breath sounds normal. No tachypnea. No respiratory distress.   Abdominal: Soft. Bowel sounds are normal. There is no tenderness.   Musculoskeletal: Normal range of motion.   Lymphadenopathy:     She has no cervical adenopathy.   Neurological: She is alert and oriented to person, place, and time. She has normal strength.   Skin: Skin is warm and dry. No rash noted.   Psychiatric: She has a normal mood and affect. Her speech is normal.   Nursing note and vitals reviewed.          Assessment:       1. Leg cramps    2. Pneumonia of left lower lobe due to infectious organism    3. Subacute pansinusitis    4. Productive cough    5. 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 Compensated metabolic acidosis    7. BMI 29.0-29.9,adult         Plan:           Leg cramps  -     magnesium oxide (MAG-OX) 400 mg tablet; Take 1 tablet (400 mg total) by mouth once daily.  Dispense: 90 tablet; Refill: 1    Pneumonia of left lower lobe due to infectious organism  -     hydrocodone-chlorpheniramine (TUSSIGON) 10-8 mg/5 mL suspension; Take 5 mL's by mouth every 12 (twelve) hours as needed for Cough.  Dispense: 100 mL; Refill: 0    Subacute pansinusitis        -     Continue Rx    Productive cough        -       Hydrocodone with chlorpheniramine 100 cc one teaspoon bid for cough    DM type 2 causing CCK stage 5        -     Continue as she is    Compensated metabolic acidosis        -     Per Renal  BMI 29.0-29.9,adult

## 2018-05-31 ENCOUNTER — TELEPHONE (OUTPATIENT)
Dept: INTERNAL MEDICINE | Facility: CLINIC | Age: 68
End: 2018-05-31

## 2018-06-20 ENCOUNTER — TELEPHONE (OUTPATIENT)
Dept: INTERNAL MEDICINE | Facility: CLINIC | Age: 68
End: 2018-06-20

## 2018-06-20 NOTE — TELEPHONE ENCOUNTER
----- Message from Zahira Bryan sent at 6/18/2018  5:04 PM CDT -----  Contact: [employer]  CALLED TO ASK IF OUR OFFICE WOULD LIKE TO FILE AN APPEAL.

## 2018-07-10 ENCOUNTER — TELEPHONE (OUTPATIENT)
Dept: INTERNAL MEDICINE | Facility: CLINIC | Age: 68
End: 2018-07-10

## 2018-07-10 NOTE — TELEPHONE ENCOUNTER
----- Message from April Horton MD sent at 7/10/2018  2:51 PM CDT -----  Inform pt despite my dx they refuse to pay for B12  ----- Message -----  From: Denise Baron LPN  Sent: 7/9/2018   8:32 AM  To: April Horton MD    Appeal for B12 shots denied  ----- Message -----  From: Stephanie Gordon  Sent: 7/6/2018  10:37 AM  To: [facility] Staff    Prior authorization, denial, 07/06/18

## 2018-08-06 DIAGNOSIS — E53.8 VITAMIN B12 DEFICIENCY: ICD-10-CM

## 2018-08-06 RX ORDER — CYANOCOBALAMIN 1000 UG/ML
2000 INJECTION, SOLUTION INTRAMUSCULAR; SUBCUTANEOUS WEEKLY
Qty: 24 ML | Refills: 0 | Status: SHIPPED | OUTPATIENT
Start: 2018-08-06 | End: 2018-08-29 | Stop reason: SDUPTHER

## 2018-08-29 ENCOUNTER — PATIENT MESSAGE (OUTPATIENT)
Dept: FAMILY MEDICINE | Facility: CLINIC | Age: 68
End: 2018-08-29

## 2018-08-29 DIAGNOSIS — E53.8 VITAMIN B12 DEFICIENCY: ICD-10-CM

## 2018-08-29 DIAGNOSIS — I10 ESSENTIAL HYPERTENSION, BENIGN: Chronic | ICD-10-CM

## 2018-08-29 RX ORDER — CYANOCOBALAMIN 1000 UG/ML
2000 INJECTION, SOLUTION INTRAMUSCULAR; SUBCUTANEOUS WEEKLY
Qty: 24 ML | Refills: 0 | Status: SHIPPED | OUTPATIENT
Start: 2018-08-29 | End: 2018-10-31

## 2018-08-29 RX ORDER — DILTIAZEM HYDROCHLORIDE 240 MG/1
240 CAPSULE, COATED, EXTENDED RELEASE ORAL DAILY
Qty: 90 CAPSULE | Refills: 0 | Status: SHIPPED | OUTPATIENT
Start: 2018-08-29 | End: 2018-11-29 | Stop reason: SDUPTHER

## 2018-08-30 DIAGNOSIS — R25.2 SPASM: ICD-10-CM

## 2018-08-30 DIAGNOSIS — I10 ESSENTIAL HYPERTENSION, BENIGN: Chronic | ICD-10-CM

## 2018-08-30 RX ORDER — CYCLOBENZAPRINE HCL 10 MG
10 TABLET ORAL 2 TIMES DAILY
Qty: 180 TABLET | Refills: 1 | Status: SHIPPED | OUTPATIENT
Start: 2018-08-30 | End: 2018-10-31 | Stop reason: SDUPTHER

## 2018-08-30 RX ORDER — LEVOTHYROXINE SODIUM 88 UG/1
88 TABLET ORAL DAILY
Qty: 90 TABLET | Refills: 1 | Status: SHIPPED | OUTPATIENT
Start: 2018-08-30 | End: 2019-04-04 | Stop reason: SDUPTHER

## 2018-08-30 RX ORDER — DICYCLOMINE HYDROCHLORIDE 10 MG/1
10 CAPSULE ORAL 3 TIMES DAILY
Qty: 90 CAPSULE | Refills: 2 | Status: SHIPPED | OUTPATIENT
Start: 2018-08-30 | End: 2018-10-31 | Stop reason: SDUPTHER

## 2018-08-30 RX ORDER — POTASSIUM CHLORIDE 750 MG/1
10 TABLET, EXTENDED RELEASE ORAL ONCE
Qty: 1 TABLET | Refills: 0 | Status: SHIPPED | OUTPATIENT
Start: 2018-08-30 | End: 2018-08-30

## 2018-09-06 ENCOUNTER — TELEPHONE (OUTPATIENT)
Dept: FAMILY MEDICINE | Facility: CLINIC | Age: 68
End: 2018-09-06

## 2018-09-10 ENCOUNTER — TELEPHONE (OUTPATIENT)
Dept: FAMILY MEDICINE | Facility: CLINIC | Age: 68
End: 2018-09-10

## 2018-10-01 RX ORDER — POTASSIUM CHLORIDE 20 MEQ/15ML
20 SOLUTION ORAL DAILY
COMMUNITY
Start: 2018-08-30 | End: 2019-01-07 | Stop reason: SDUPTHER

## 2018-10-02 ENCOUNTER — OFFICE VISIT (OUTPATIENT)
Dept: FAMILY MEDICINE | Facility: CLINIC | Age: 68
End: 2018-10-02
Payer: MEDICARE

## 2018-10-02 VITALS
RESPIRATION RATE: 16 BRPM | HEIGHT: 67 IN | HEART RATE: 80 BPM | SYSTOLIC BLOOD PRESSURE: 136 MMHG | OXYGEN SATURATION: 96 % | WEIGHT: 186 LBS | DIASTOLIC BLOOD PRESSURE: 80 MMHG | BODY MASS INDEX: 29.19 KG/M2 | TEMPERATURE: 98 F

## 2018-10-02 DIAGNOSIS — M54.42 CHRONIC BILATERAL LOW BACK PAIN WITH BILATERAL SCIATICA: ICD-10-CM

## 2018-10-02 DIAGNOSIS — E11.9 CONTROLLED TYPE 2 DIABETES MELLITUS WITHOUT COMPLICATION, WITHOUT LONG-TERM CURRENT USE OF INSULIN: ICD-10-CM

## 2018-10-02 DIAGNOSIS — M54.41 CHRONIC BILATERAL LOW BACK PAIN WITH BILATERAL SCIATICA: ICD-10-CM

## 2018-10-02 DIAGNOSIS — Z12.39 BREAST CANCER SCREENING: ICD-10-CM

## 2018-10-02 DIAGNOSIS — E55.9 VITAMIN D DEFICIENCY: ICD-10-CM

## 2018-10-02 DIAGNOSIS — R25.2 LEG CRAMPS: ICD-10-CM

## 2018-10-02 DIAGNOSIS — I10 ESSENTIAL HYPERTENSION, BENIGN: Chronic | ICD-10-CM

## 2018-10-02 DIAGNOSIS — D50.8 OTHER IRON DEFICIENCY ANEMIA: ICD-10-CM

## 2018-10-02 DIAGNOSIS — M89.9 DISEASE OF BONE: ICD-10-CM

## 2018-10-02 DIAGNOSIS — J40 BRONCHITIS: Primary | ICD-10-CM

## 2018-10-02 DIAGNOSIS — G89.29 CHRONIC BILATERAL LOW BACK PAIN WITH BILATERAL SCIATICA: ICD-10-CM

## 2018-10-02 DIAGNOSIS — E78.00 PURE HYPERCHOLESTEROLEMIA: ICD-10-CM

## 2018-10-02 DIAGNOSIS — Z12.11 SCREEN FOR COLON CANCER: ICD-10-CM

## 2018-10-02 PROCEDURE — 3044F HG A1C LEVEL LT 7.0%: CPT | Mod: ,,, | Performed by: INTERNAL MEDICINE

## 2018-10-02 PROCEDURE — 3079F DIAST BP 80-89 MM HG: CPT | Mod: ,,, | Performed by: INTERNAL MEDICINE

## 2018-10-02 PROCEDURE — 3077F SYST BP >= 140 MM HG: CPT | Mod: ,,, | Performed by: INTERNAL MEDICINE

## 2018-10-02 PROCEDURE — 99214 OFFICE O/P EST MOD 30 MIN: CPT | Mod: ,,, | Performed by: INTERNAL MEDICINE

## 2018-10-02 PROCEDURE — 1101F PT FALLS ASSESS-DOCD LE1/YR: CPT | Mod: ,,, | Performed by: INTERNAL MEDICINE

## 2018-10-02 RX ORDER — BENZONATATE 200 MG/1
200 CAPSULE ORAL 3 TIMES DAILY PRN
Qty: 30 CAPSULE | Refills: 1 | Status: SHIPPED | OUTPATIENT
Start: 2018-10-02 | End: 2018-10-12

## 2018-10-02 RX ORDER — DOXYCYCLINE HYCLATE 100 MG
100 TABLET ORAL 2 TIMES DAILY
Qty: 20 TABLET | Refills: 0 | Status: SHIPPED | OUTPATIENT
Start: 2018-10-02 | End: 2018-10-31

## 2018-10-02 RX ORDER — TRAMADOL HYDROCHLORIDE 50 MG/1
50 TABLET ORAL EVERY 6 HOURS PRN
Qty: 30 TABLET | Refills: 1 | Status: SHIPPED | OUTPATIENT
Start: 2018-10-02 | End: 2018-10-12

## 2018-10-02 RX ORDER — ERGOCALCIFEROL 1.25 MG/1
50000 CAPSULE ORAL
Qty: 12 CAPSULE | Refills: 0 | Status: SHIPPED | OUTPATIENT
Start: 2018-10-02 | End: 2018-11-29 | Stop reason: SDUPTHER

## 2018-10-02 RX ORDER — LANOLIN ALCOHOL/MO/W.PET/CERES
400 CREAM (GRAM) TOPICAL DAILY
Qty: 90 TABLET | Refills: 1 | COMMUNITY
Start: 2018-10-02 | End: 2020-02-10 | Stop reason: CLARIF

## 2018-10-02 NOTE — PROGRESS NOTES
SUBJECTIVE:    Patient ID: Shara Hutchins is a 68 y.o. female.    Chief Complaint: Diabetes; Hypertension; Cough (non-productive); and Follow-up (asking for something to help with pain)    HPI     Pt comes in for recheck    Yesterday felt some congestion in her chest-croupy cough last night which hurts down to the middle of the chest-has been staying at hospital with her sister-no fever-sore throat, no sinus headaches-does not feel like sinuses are involved-cough is nonproductive-TD AP ? Up to date--last nite took some Nyquil    Back pain flaring for 2-3 weeks-lower back-long history    BP- has been controlled at home    Diabetes--glucose 96 AC 5.6      Orders Only on 04/19/2018   Component Date Value Ref Range Status    Glucose 04/19/2018 96  65 - 99 mg/dL Final    BUN, Bld 04/19/2018 38* 8 - 27 mg/dL Final    Creatinine 04/19/2018 3.09* 0.57 - 1.00 mg/dL Final    eGFR if non African American 04/19/2018 15* >59 mL/min/1.73 Final    eGFR if  04/19/2018 17* >59 mL/min/1.73 Final    BUN/Creatinine Ratio 04/19/2018 12  12 - 28 Final    Sodium 04/19/2018 141  134 - 144 mmol/L Final    Potassium 04/19/2018 5.0  3.5 - 5.2 mmol/L Final    Chloride 04/19/2018 108* 96 - 106 mmol/L Final    CO2 04/19/2018 16* 18 - 29 mmol/L Final    Calcium 04/19/2018 8.5* 8.7 - 10.3 mg/dL Final    Total Protein 04/19/2018 6.3  6.0 - 8.5 g/dL Final    Albumin 04/19/2018 4.0  3.6 - 4.8 g/dL Final    Globulin, Total 04/19/2018 2.3  1.5 - 4.5 g/dL Final    Albumin/Globulin Ratio 04/19/2018 1.7  1.2 - 2.2 Final    Total Bilirubin 04/19/2018 0.2  0.0 - 1.2 mg/dL Final    Alkaline Phosphatase 04/19/2018 114  39 - 117 IU/L Final    AST 04/19/2018 13  0 - 40 IU/L Final    ALT 04/19/2018 9  0 - 32 IU/L Final    Hemoglobin A1C 04/19/2018 5.3  4.8 - 5.6 % Final    Amylase 04/19/2018 103  31 - 124 U/L Final    Lipase 04/19/2018 41  14 - 72 U/L Final    Lactate 04/19/2018 6.1  4.8 - 25.7 mg/dL Final    Rose Marie Azevedo  "CMP 14 Default 2018 Comment   Final   Office Visit on 2018   Component Date Value Ref Range Status    Hemoglobin A1C 2018 5.6   Final       Past Medical History:   Diagnosis Date    Chronic kidney disease, stage III (moderate) 2018    Diabetes mellitus, type 2     Fatigue     Hypertension      Social History     Socioeconomic History    Marital status:      Spouse name: Not on file    Number of children: Not on file    Years of education: Not on file    Highest education level: Not on file   Social Needs    Financial resource strain: Not on file    Food insecurity - worry: Not on file    Food insecurity - inability: Not on file    Transportation needs - medical: Not on file    Transportation needs - non-medical: Not on file   Occupational History    Not on file   Tobacco Use    Smoking status: Former Smoker     Packs/day: 1.00     Years: 33.00     Pack years: 33.00     Types: Cigarettes     Last attempt to quit: 2000     Years since quittin.2    Smokeless tobacco: Never Used   Substance and Sexual Activity    Alcohol use: No    Drug use: No    Sexual activity: Not on file   Other Topics Concern    Not on file   Social History Narrative    Not on file     Past Surgical History:   Procedure Laterality Date    ABDOMINAL SURGERY      BACK SURGERY      CERVICAL FUSION      x 4    cervix repair      CHOLECYSTECTOMY      HYSTERECTOMY      LUMBAR LAMINECTOMY      NECK SURGERY      SHOULDER SURGERY      vulva cancer       Family History   Problem Relation Age of Onset    Heart disease Mother     Hypertension Mother     Cancer Mother         lung    Heart disease Father     No Known Problems Sister         2    No Known Problems Daughter      Vitals:    10/02/18 0829   BP: (!) 142/80   Pulse: 80   Resp: 16   Temp: 98.1 °F (36.7 °C)   SpO2: 96%   Weight: 84.4 kg (186 lb)   Height: 5' 7" (1.702 m)       Review of Systems   Constitutional: Negative for " appetite change, chills, diaphoresis, fatigue, fever and unexpected weight change.        Gaining weight-ot eating a lot but this is more than she could tolerate for a long period of time   HENT: Negative for congestion, ear pain, hearing loss, nosebleeds, postnasal drip, sinus pressure, sinus pain, sneezing, sore throat, tinnitus, trouble swallowing and voice change.         HPI   Eyes: Negative for photophobia, pain, itching and visual disturbance.   Respiratory: Negative for apnea, cough, chest tightness, shortness of breath, wheezing and stridor.    Cardiovascular: Negative for chest pain, palpitations and leg swelling.   Gastrointestinal: Negative for abdominal distention, abdominal pain, blood in stool, constipation, diarrhea, nausea and vomiting.        At this time no GI sx   Endocrine: Negative for cold intolerance, heat intolerance, polydipsia and polyuria.   Genitourinary: Negative for difficulty urinating, dyspareunia, dysuria, flank pain, frequency, hematuria, menstrual problem, pelvic pain, urgency, vaginal discharge and vaginal pain.        Stress incontinence intermittently   Musculoskeletal: Negative for arthralgias, back pain (flaring now-rarely takes tramadol but needs for flares), joint swelling, myalgias, neck pain and neck stiffness.        HPI--leg cramps mostly at night   Skin: Negative for pallor.   Allergic/Immunologic: Negative for environmental allergies and food allergies.   Neurological: Negative for dizziness, tremors, speech difficulty, weakness, light-headedness (sometimes she can stand up and walk into furniture-) and numbness.   Hematological: Does not bruise/bleed easily.   Psychiatric/Behavioral: Negative for agitation, confusion, decreased concentration, sleep disturbance and suicidal ideas. The patient is not nervous/anxious.         Situational anxiety surrounding sister's bypass-hx heart disease with stents          Objective:      Physical Exam   Constitutional: She is  oriented to person, place, and time. She appears well-developed and well-nourished. She is cooperative. No distress.   HENT:   Head: Normocephalic and atraumatic.   Right Ear: Tympanic membrane normal.   Left Ear: Tympanic membrane normal.   Mouth/Throat: Uvula is midline and mucous membranes are normal.   hoarse   Eyes: Conjunctivae and EOM are normal. Right pupil is round and reactive. Left pupil is round and reactive.   Neck: Trachea normal and normal range of motion. Neck supple. No JVD present. No thyromegaly present.   Cardiovascular: Normal rate, regular rhythm, normal heart sounds and intact distal pulses.   Pulmonary/Chest: Effort normal. No tachypnea. No respiratory distress.   End inspiratory fine wheezes generalized   Abdominal: Soft. Bowel sounds are normal. There is no tenderness.   Musculoskeletal: Normal range of motion.   Lymphadenopathy:     She has no cervical adenopathy.   Neurological: She is alert and oriented to person, place, and time. She has normal strength.   Skin: Skin is warm and dry. No rash noted.   Psychiatric: She has a normal mood and affect. Her speech is normal.   Nursing note and vitals reviewed.          Assessment:       1. Bronchitis    2. Leg cramps    3. BMI 29.0-29.9,adult    4. Vitamin D deficiency    5. Screen for colon cancer    6. Breast cancer screening    7. Disease of bone    8. Essential hypertension, benign    9. Pure hypercholesterolemia    10. Other iron deficiency anemia    11. Controlled type 2 diabetes mellitus without complication, without long-term current use of insulin    12. Chronic bilateral low back pain with bilateral sciatica         Plan:           Bronchitis  -     CBC auto differential; Future; Expected date: 10/02/2018    Leg cramps  -     Comprehensive metabolic panel; Future; Expected date: 10/02/2018  -     magnesium oxide (MAG-OX) 400 mg tablet; Take 1 tablet (400 mg total) by mouth once daily.  Dispense: 90 tablet; Refill: 1    BMI  29.0-29.9,adult    Vitamin D deficiency  -     ergocalciferol (ERGOCALCIFEROL) 50,000 unit Cap; Take 1 capsule (50,000 Units total) by mouth every 7 days.  Dispense: 12 capsule; Refill: 0  -     Check vit D level and hold ergocalciferol for now     Screen for colon cancer  -     Cologuard Screening (Multitarget Stool DNA)    Breast cancer screening  -     Mammo Digital Screening Bilat without CA    Disease of bone  -     DXA Bone Density Spine And Hip    Essential hypertension, benign  -     CBC auto differential; Future; Expected date: 10/02/2018  -     Comprehensive metabolic panel; Future; Expected date: 10/02/2018    Pure hypercholesterolemia  -     Comprehensive metabolic panel; Future; Expected date: 10/02/2018  -     Lipid panel; Future; Expected date: 10/02/2018    Other iron deficiency anemia        -     Check CBC    Controlled type 2 diabetes mellitus without complication, without long-term current use of insulin        -     Continue as is    Low back pain        -     Tramadol 50 mg every 6 hours as needed for pain

## 2018-10-31 ENCOUNTER — OFFICE VISIT (OUTPATIENT)
Dept: FAMILY MEDICINE | Facility: CLINIC | Age: 68
End: 2018-10-31
Payer: MEDICARE

## 2018-10-31 VITALS
WEIGHT: 179.81 LBS | HEIGHT: 67 IN | HEART RATE: 107 BPM | OXYGEN SATURATION: 98 % | BODY MASS INDEX: 28.22 KG/M2 | DIASTOLIC BLOOD PRESSURE: 84 MMHG | TEMPERATURE: 98 F | SYSTOLIC BLOOD PRESSURE: 138 MMHG | RESPIRATION RATE: 16 BRPM

## 2018-10-31 DIAGNOSIS — D72.819 LEUKOPENIA, UNSPECIFIED TYPE: Primary | ICD-10-CM

## 2018-10-31 DIAGNOSIS — Z09 HOSPITAL DISCHARGE FOLLOW-UP: ICD-10-CM

## 2018-10-31 DIAGNOSIS — I10 ESSENTIAL HYPERTENSION, BENIGN: Chronic | ICD-10-CM

## 2018-10-31 DIAGNOSIS — K52.9 INFLAMMATORY BOWEL DISEASE: Chronic | ICD-10-CM

## 2018-10-31 DIAGNOSIS — M62.838 MUSCLE SPASMS OF NECK: ICD-10-CM

## 2018-10-31 DIAGNOSIS — E11.42 DIABETIC POLYNEUROPATHY ASSOCIATED WITH TYPE 2 DIABETES MELLITUS: Chronic | ICD-10-CM

## 2018-10-31 DIAGNOSIS — N18.4 STAGE 4 CHRONIC KIDNEY DISEASE: ICD-10-CM

## 2018-10-31 PROBLEM — E11.9 DIABETES MELLITUS, TYPE 2: Status: ACTIVE | Noted: 2018-10-31

## 2018-10-31 PROCEDURE — 1111F DSCHRG MED/CURRENT MED MERGE: CPT | Mod: ,,, | Performed by: NURSE PRACTITIONER

## 2018-10-31 PROCEDURE — 3079F DIAST BP 80-89 MM HG: CPT | Mod: ,,, | Performed by: NURSE PRACTITIONER

## 2018-10-31 PROCEDURE — 1101F PT FALLS ASSESS-DOCD LE1/YR: CPT | Mod: ,,, | Performed by: NURSE PRACTITIONER

## 2018-10-31 PROCEDURE — 3075F SYST BP GE 130 - 139MM HG: CPT | Mod: ,,, | Performed by: NURSE PRACTITIONER

## 2018-10-31 PROCEDURE — 3044F HG A1C LEVEL LT 7.0%: CPT | Mod: ,,, | Performed by: NURSE PRACTITIONER

## 2018-10-31 PROCEDURE — 99214 OFFICE O/P EST MOD 30 MIN: CPT | Mod: ,,, | Performed by: NURSE PRACTITIONER

## 2018-10-31 RX ORDER — CALCITRIOL 0.25 UG/1
1 CAPSULE ORAL DAILY
COMMUNITY
Start: 2018-10-24 | End: 2019-01-07 | Stop reason: SDUPTHER

## 2018-10-31 RX ORDER — DICYCLOMINE HYDROCHLORIDE 10 MG/1
10 CAPSULE ORAL 3 TIMES DAILY
Qty: 90 CAPSULE | Refills: 2 | Status: SHIPPED | OUTPATIENT
Start: 2018-10-31 | End: 2019-01-07 | Stop reason: SDUPTHER

## 2018-10-31 RX ORDER — TRAMADOL HYDROCHLORIDE 50 MG/1
1 TABLET ORAL DAILY
COMMUNITY
End: 2019-04-08 | Stop reason: SDUPTHER

## 2018-10-31 RX ORDER — HYDRALAZINE HYDROCHLORIDE 10 MG/1
1 TABLET, FILM COATED ORAL DAILY
COMMUNITY
Start: 2018-10-24 | End: 2018-11-29 | Stop reason: SDUPTHER

## 2018-10-31 RX ORDER — GABAPENTIN 400 MG/1
800 CAPSULE ORAL NIGHTLY
Qty: 180 CAPSULE | Refills: 1 | Status: SHIPPED | OUTPATIENT
Start: 2018-10-31 | End: 2019-08-05

## 2018-10-31 RX ORDER — CYCLOBENZAPRINE HCL 10 MG
10 TABLET ORAL 2 TIMES DAILY
Qty: 180 TABLET | Refills: 1 | Status: SHIPPED | OUTPATIENT
Start: 2018-10-31 | End: 2020-02-10 | Stop reason: CLARIF

## 2018-10-31 NOTE — PATIENT INSTRUCTIONS

## 2018-10-31 NOTE — LETTER
October 31, 2018      [facility]  [address] [address]  Phone: [phone]  Fax: [phone]       Patient: Shara Hutchins   YOB: 1950  Date of Visit: 10/31/2018    To Whom It May Concern:    Maryjo Hutchins  was at [facility] on 10/31/2018. She may return to work/school on 11/5/2018 with no restrictions. If you have any questions or concerns, or if I can be of further assistance, please do not hesitate to contact me.    Sincerely,        CHIN Mccallum,JAMESP-C

## 2018-11-04 PROBLEM — N18.4 STAGE 4 CHRONIC KIDNEY DISEASE: Status: ACTIVE | Noted: 2018-11-04

## 2018-11-04 PROBLEM — D72.819 LEUKOPENIA: Status: ACTIVE | Noted: 2018-11-04

## 2018-11-04 NOTE — PROGRESS NOTES
SUBJECTIVE:      Patient ID: Shara Hutchins is a 68 y.o. female.    Chief Complaint: Follow-up (hospital follow up)    FU for 3-night [facility] hospital stay 10/24 - severely low Mg & Ca, low K+; parasthesia to face & arms x 3 days, diarrhea, cramping, weakness RT low electrolytes; rec'd adequate replacement for resolution of s/s    Reviewed labs from stay & prior labs - WBCs trending low, chronic anemia, low Ca & Mg upon d/c, elevated BP, med reconciliation performed at this visit    States she's touched base with Tveit s/p discharge, he will contact her once he's reviewed labs/notes from stay    Discussed outstanding health maintenance - had to reschedule Dexa & mammo, eye exam 6 months ago in [facility], will get outstanding labs done prior to appt with Sol in January      Hypertension   The problem is controlled. Associated symptoms include headaches, neck pain and palpitations. Pertinent negatives include no anxiety, blurred vision, chest pain, malaise/fatigue, orthopnea, peripheral edema, PND, shortness of breath or sweats. Agents associated with hypertension include thyroid hormones. Risk factors for coronary artery disease include sedentary lifestyle, post-menopausal state, diabetes mellitus and family history. Past treatments include diuretics and calcium channel blockers. The current treatment provides moderate improvement. Compliance problems include exercise and diet.  Hypertensive end-organ damage includes kidney disease. Identifiable causes of hypertension include chronic renal disease, renovascular disease and a thyroid problem.       Past Surgical History:   Procedure Laterality Date    ABDOMINAL SURGERY      BACK SURGERY      CERVICAL FUSION      x 4    cervix repair      CHOLECYSTECTOMY  2000    HYSTERECTOMY  1980    LUMBAR LAMINECTOMY      NECK SURGERY      SHOULDER SURGERY      vulva cancer       Family History   Problem Relation Age of Onset    Heart disease Mother     Hypertension Mother      Cancer Mother         lung    Heart disease Father     Heart disease Sister         Open heart surgery    No Known Problems Daughter       Social History     Socioeconomic History    Marital status:      Spouse name: None    Number of children: None    Years of education: None    Highest education level: None   Social Needs    Financial resource strain: None    Food insecurity - worry: None    Food insecurity - inability: None    Transportation needs - medical: None    Transportation needs - non-medical: None   Occupational History    None   Tobacco Use    Smoking status: Former Smoker     Packs/day: 1.00     Years: 33.00     Pack years: 33.00     Types: Cigarettes     Last attempt to quit: 2000     Years since quittin.2    Smokeless tobacco: Never Used   Substance and Sexual Activity    Alcohol use: No    Drug use: No    Sexual activity: None   Other Topics Concern    None   Social History Narrative    None     Current Outpatient Medications   Medication Sig Dispense Refill    blood sugar diagnostic (BLOOD GLUCOSE TEST) Strp 1 strip by Misc.(Non-Drug; Combo Route) route 3 (three) times daily. 300 strip 1    blood-glucose meter (EMBRACE BLOOD GLUCOSE SYSTEM) Misc Check sugar three times daily 1 each 0    calcitRIOL (ROCALTROL) 0.25 MCG Cap Take 1 tablet by mouth once daily.      cyclobenzaprine (FLEXERIL) 10 MG tablet Take 1 tablet (10 mg total) by mouth 2 (two) times daily. 180 tablet 1    dicyclomine (BENTYL) 10 MG capsule Take 1 capsule (10 mg total) by mouth 3 (three) times daily. 90 capsule 2    diltiaZEM (CARDIZEM CD) 240 MG 24 hr capsule Take 1 capsule (240 mg total) by mouth once daily. 90 capsule 0    ergocalciferol (ERGOCALCIFEROL) 50,000 unit Cap Take 1 capsule (50,000 Units total) by mouth every 7 days. 12 capsule 0    ferrous sulfate 325 mg (65 mg iron) CpSR Take 1 tablet by mouth once daily.      fluconazole (DIFLUCAN) 200 MG Tab Take 100 mg by mouth  "once daily.      gabapentin (NEURONTIN) 400 MG capsule Take 2 capsules (800 mg total) by mouth every evening. 180 capsule 1    GATTEX 30-VIAL 5 mg Kit       hydrALAZINE (APRESOLINE) 10 MG tablet Take 1 tablet by mouth once daily.      lancets (EMBRACE LANCETS) 30 gauge Misc by Misc.(Non-Drug; Combo Route) route 3 (three) times daily.      magnesium oxide (MAG-OX) 400 mg tablet Take 1 tablet (400 mg total) by mouth once daily. 90 tablet 1    potassium chloride (KLOR-CON) 10 MEQ TbSR Take 1 tablet by mouth once daily.      promethazine (PHENERGAN) 25 MG tablet Take 25 mg by mouth daily as needed for Nausea.      SODIUM BICARBONATE ORAL Take 10 g by mouth 2 (two) times daily.      SYNTHROID 88 mcg tablet Take 1 tablet (88 mcg total) by mouth Daily. 90 tablet 1    syringe with needle (SYRINGE 3CC/25GX1") 3 mL 25 gauge x 1" Syrg 1 Syringe by Misc.(Non-Drug; Combo Route) route once a week. 100 Syringe 0    traMADol (ULTRAM) 50 mg tablet Take 1 tablet by mouth once daily.       No current facility-administered medications for this visit.      Review of patient's allergies indicates:   Allergen Reactions    Pcn [penicillins] Anaphylaxis    Ciprofloxacin      Elevated liver enzymes        Past Medical History:   Diagnosis Date    Chronic kidney disease, stage III (moderate) 1/7/2018    Diabetes mellitus, type 2     Fatigue     Hypertension      Past Surgical History:   Procedure Laterality Date    ABDOMINAL SURGERY      BACK SURGERY      CERVICAL FUSION      x 4    cervix repair      CHOLECYSTECTOMY  2000    HYSTERECTOMY  1980    LUMBAR LAMINECTOMY      NECK SURGERY      SHOULDER SURGERY      vulva cancer         Review of Systems   Constitutional: Positive for activity change and unexpected weight change. Negative for appetite change, fatigue and malaise/fatigue.   HENT: Positive for hearing loss. Negative for congestion, ear pain, postnasal drip, rhinorrhea, sinus pressure, sinus pain, sneezing, " "sore throat and trouble swallowing.    Eyes: Negative for blurred vision, photophobia, pain, discharge and visual disturbance.   Respiratory: Negative for cough, chest tightness, shortness of breath and wheezing.    Cardiovascular: Positive for palpitations. Negative for chest pain, orthopnea, leg swelling and PND.   Gastrointestinal: Positive for diarrhea and vomiting. Negative for abdominal distention, abdominal pain, blood in stool, constipation and nausea.   Endocrine: Positive for polydipsia and polyuria. Negative for cold intolerance and heat intolerance.   Genitourinary: Negative for difficulty urinating, dysuria, flank pain, frequency, hematuria, menstrual problem, pelvic pain and urgency.   Musculoskeletal: Positive for neck pain. Negative for arthralgias, back pain, joint swelling and myalgias.   Skin: Negative for pallor.   Allergic/Immunologic: Negative for environmental allergies and food allergies.   Neurological: Positive for weakness and headaches. Negative for dizziness, light-headedness and numbness.   Hematological: Does not bruise/bleed easily.   Psychiatric/Behavioral: Negative for agitation, confusion, decreased concentration, dysphoric mood and sleep disturbance. The patient is not nervous/anxious.       OBJECTIVE:      Vitals:    10/31/18 1056   BP: 138/84   Pulse: 107   Resp: 16   Temp: 98.3 °F (36.8 °C)   TempSrc: Oral   SpO2: 98%   Weight: 81.6 kg (179 lb 12.8 oz)   Height: 5' 7" (1.702 m)     Physical Exam   Constitutional: She is oriented to person, place, and time. She appears well-developed and well-nourished. No distress.   HENT:   Head: Normocephalic and atraumatic.   Right Ear: Hearing normal.   Left Ear: Hearing normal.   Nose: Nose normal. No rhinorrhea.   Mouth/Throat: Mucous membranes are normal.   Eyes: Conjunctivae and lids are normal. Pupils are equal, round, and reactive to light. Right eye exhibits no discharge. Left eye exhibits no discharge. Right conjunctiva is not " injected. Left conjunctiva is not injected. Right pupil is round and reactive. Left pupil is round and reactive. Pupils are equal.   Neck: Trachea normal and normal range of motion. Neck supple. No JVD present. No tracheal deviation present. No thyromegaly present.   Cardiovascular: Regular rhythm, normal heart sounds and intact distal pulses. Tachycardia present. Exam reveals no gallop and no friction rub.   No murmur heard.  Pulses:       Radial pulses are 2+ on the right side, and 2+ on the left side.   Pulmonary/Chest: Effort normal and breath sounds normal. No stridor. No respiratory distress. She has no decreased breath sounds. She has no wheezes. She has no rhonchi. She has no rales.   Abdominal: Soft. Bowel sounds are normal. She exhibits no distension. There is no tenderness. There is no rigidity and no guarding.   Musculoskeletal: Normal range of motion. She exhibits no edema.   Lymphadenopathy:     She has no cervical adenopathy.   Neurological: She is alert and oriented to person, place, and time. She has normal strength. She displays no atrophy. She displays a negative Romberg sign. Coordination and gait normal.   Skin: Skin is warm and dry. Capillary refill takes less than 2 seconds. No lesion and no rash noted. No cyanosis. No pallor.   Psychiatric: She has a normal mood and affect. Her speech is normal and behavior is normal. Judgment and thought content normal. Cognition and memory are normal. She is attentive.   Nursing note and vitals reviewed.     Assessment:       1. Leukopenia, unspecified type    2. Hospital discharge follow-up    3. Muscle spasms of neck    4. Diabetic polyneuropathy associated with type 2 diabetes mellitus    5. Inflammatory bowel disease    6. Essential hypertension, benign    7. Stage 4 chronic kidney disease        Plan:       Leukopenia, unspecified type  -     Ambulatory referral to Hematology / Oncology    Hospital discharge follow-up        - stable at this time         - med reconciliation s/p discharge: started on Rocaltrol 0.25 mcg daily, Hydralazine 10 mg TID, Mag oxide 400 mg daily; all other meds remain the same    Muscle spasms of neck  -     cyclobenzaprine (FLEXERIL) 10 MG tablet; Take 1 tablet (10 mg total) by mouth 2 (two) times daily.  Dispense: 180 tablet; Refill: 1    Diabetic polyneuropathy associated with type 2 diabetes mellitus  -     gabapentin (NEURONTIN) 400 MG capsule; Take 2 capsules (800 mg total) by mouth every evening.  Dispense: 180 capsule; Refill: 1  - states DM is diet-controlled, labs ordered to be done before next visit with Sol in January  - checks sugars PRN    Inflammatory bowel disease  -     dicyclomine (BENTYL) 10 MG capsule; Take 1 capsule (10 mg total) by mouth 3 (three) times daily.  Dispense: 90 capsule; Refill: 2    Essential hypertension, benign   -stable on meds    Stage 4 chronic kidney disease   - follows with Tveit      Follow-up if symptoms worsen or fail to improve.      11/4/2018 Soham Lopez, CHIN, FNP-C

## 2018-11-15 RX ORDER — FLUOXETINE 10 MG/1
CAPSULE ORAL
Qty: 90 CAPSULE | Refills: 0 | Status: SHIPPED | OUTPATIENT
Start: 2018-11-15 | End: 2019-01-07

## 2018-11-29 DIAGNOSIS — E55.9 VITAMIN D DEFICIENCY: ICD-10-CM

## 2018-11-29 DIAGNOSIS — I10 ESSENTIAL HYPERTENSION, BENIGN: Chronic | ICD-10-CM

## 2018-11-29 RX ORDER — DILTIAZEM HYDROCHLORIDE 120 MG/1
120 CAPSULE, COATED, EXTENDED RELEASE ORAL DAILY
Qty: 90 CAPSULE | Refills: 0 | Status: SHIPPED | OUTPATIENT
Start: 2018-11-29 | End: 2019-04-04 | Stop reason: SDUPTHER

## 2018-11-29 RX ORDER — HYDRALAZINE HYDROCHLORIDE 10 MG/1
10 TABLET, FILM COATED ORAL EVERY 8 HOURS
Qty: 90 TABLET | Refills: 1 | Status: SHIPPED | OUTPATIENT
Start: 2018-11-29 | End: 2019-01-07

## 2018-11-29 RX ORDER — ERGOCALCIFEROL 1.25 MG/1
50000 CAPSULE ORAL
Qty: 12 CAPSULE | Refills: 0 | Status: SHIPPED | OUTPATIENT
Start: 2018-11-29 | End: 2019-01-07 | Stop reason: SDUPTHER

## 2018-12-20 ENCOUNTER — TELEPHONE (OUTPATIENT)
Dept: FAMILY MEDICINE | Facility: CLINIC | Age: 68
End: 2018-12-20

## 2018-12-20 ENCOUNTER — PATIENT MESSAGE (OUTPATIENT)
Dept: FAMILY MEDICINE | Facility: CLINIC | Age: 68
End: 2018-12-20

## 2018-12-20 LAB
ALBUMIN SERPL-MCNC: 3.8 G/DL (ref 3.1–4.7)
ALP SERPL-CCNC: 146 IU/L (ref 40–104)
ALT (SGPT): 18 IU/L (ref 3–33)
AST SERPL-CCNC: 21 IU/L (ref 10–40)
BILIRUB SERPL-MCNC: 0.7 MG/DL (ref 0.3–1)
BUN SERPL-MCNC: 23 MG/DL (ref 8–20)
CALCIUM SERPL-MCNC: 8.8 MG/DL (ref 7.7–10.4)
CHLORIDE: 110 MMOL/L (ref 98–110)
CO2 SERPL-SCNC: 23.9 MMOL/L (ref 22.8–31.6)
CREATININE RANDOM URINE: 77 MG/DL
CREATININE: 2.21 MG/DL (ref 0.6–1.4)
GLUCOSE: 93 MG/DL (ref 70–99)
HBA1C MFR BLD: 4.8 % (ref 3.1–6.5)
MICROALBUM.,U,RANDOM: 87.1 MCG/ML (ref 0–19.9)
MICROALBUMIN/CREATININE RATIO: 113 (ref 0–30)
POTASSIUM SERPL-SCNC: 3.3 MMOL/L (ref 3.5–5)
PROT SERPL-MCNC: 6.3 G/DL (ref 6–8.2)
SODIUM: 142 MMOL/L (ref 134–144)

## 2018-12-20 NOTE — TELEPHONE ENCOUNTER
DR GARCIA INCREASED K+  10 MEQ IN AM AND 10 IN PMAND MAG  MG IN AM  IN PM MADE THAT CHANGE ON Tuesday THIS WEEK

## 2018-12-20 NOTE — TELEPHONE ENCOUNTER
----- Message from April Horton MD sent at 12/20/2018 12:51 PM CST -----  Test results are normal-we will discuss abn when she comes in but does she have any potassium at home

## 2019-01-07 ENCOUNTER — OFFICE VISIT (OUTPATIENT)
Dept: HEMATOLOGY/ONCOLOGY | Facility: CLINIC | Age: 69
End: 2019-01-07
Payer: MEDICARE

## 2019-01-07 ENCOUNTER — OFFICE VISIT (OUTPATIENT)
Dept: FAMILY MEDICINE | Facility: CLINIC | Age: 69
End: 2019-01-07
Payer: MEDICARE

## 2019-01-07 VITALS
HEART RATE: 83 BPM | DIASTOLIC BLOOD PRESSURE: 85 MMHG | SYSTOLIC BLOOD PRESSURE: 138 MMHG | WEIGHT: 178.81 LBS | BODY MASS INDEX: 28.74 KG/M2 | TEMPERATURE: 98 F | RESPIRATION RATE: 20 BRPM | HEIGHT: 66 IN

## 2019-01-07 VITALS
DIASTOLIC BLOOD PRESSURE: 78 MMHG | HEART RATE: 74 BPM | RESPIRATION RATE: 12 BRPM | OXYGEN SATURATION: 97 % | TEMPERATURE: 98 F | WEIGHT: 177 LBS | HEIGHT: 67 IN | SYSTOLIC BLOOD PRESSURE: 130 MMHG | BODY MASS INDEX: 27.78 KG/M2

## 2019-01-07 DIAGNOSIS — N18.30 ANEMIA OF CHRONIC RENAL FAILURE, STAGE 3 (MODERATE): ICD-10-CM

## 2019-01-07 DIAGNOSIS — D63.1 ANEMIA OF CHRONIC RENAL FAILURE, STAGE 3 (MODERATE): ICD-10-CM

## 2019-01-07 DIAGNOSIS — D72.819 LEUKOPENIA, UNSPECIFIED TYPE: ICD-10-CM

## 2019-01-07 DIAGNOSIS — E87.6 HYPOKALEMIA: ICD-10-CM

## 2019-01-07 DIAGNOSIS — E11.42 DIABETIC POLYNEUROPATHY ASSOCIATED WITH TYPE 2 DIABETES MELLITUS: Primary | Chronic | ICD-10-CM

## 2019-01-07 DIAGNOSIS — E55.9 VITAMIN D DEFICIENCY: ICD-10-CM

## 2019-01-07 DIAGNOSIS — D50.0 IRON DEFICIENCY ANEMIA DUE TO CHRONIC BLOOD LOSS: Chronic | ICD-10-CM

## 2019-01-07 DIAGNOSIS — K52.9 INFLAMMATORY BOWEL DISEASE: Chronic | ICD-10-CM

## 2019-01-07 DIAGNOSIS — D51.8 ANEMIA OF DECREASED VITAMIN B12 ABSORPTION: ICD-10-CM

## 2019-01-07 DIAGNOSIS — I10 ESSENTIAL HYPERTENSION, BENIGN: Chronic | ICD-10-CM

## 2019-01-07 DIAGNOSIS — N18.4 CHRONIC RENAL DISEASE, STAGE IV: ICD-10-CM

## 2019-01-07 DIAGNOSIS — R74.8 ELEVATED ALKALINE PHOSPHATASE LEVEL: ICD-10-CM

## 2019-01-07 DIAGNOSIS — R11.0 NAUSEA: ICD-10-CM

## 2019-01-07 DIAGNOSIS — C51.9 VULVAR CANCER: ICD-10-CM

## 2019-01-07 DIAGNOSIS — D50.8 IRON DEFICIENCY ANEMIA SECONDARY TO INADEQUATE DIETARY IRON INTAKE: ICD-10-CM

## 2019-01-07 PROCEDURE — 3075F SYST BP GE 130 - 139MM HG: CPT | Mod: ,,, | Performed by: INTERNAL MEDICINE

## 2019-01-07 PROCEDURE — 3044F PR MOST RECENT HEMOGLOBIN A1C LEVEL <7.0%: ICD-10-PCS | Mod: ,,, | Performed by: INTERNAL MEDICINE

## 2019-01-07 PROCEDURE — 1101F PT FALLS ASSESS-DOCD LE1/YR: CPT | Mod: ,,, | Performed by: INTERNAL MEDICINE

## 2019-01-07 PROCEDURE — 3079F PR MOST RECENT DIASTOLIC BLOOD PRESSURE 80-89 MM HG: ICD-10-PCS | Mod: ,,, | Performed by: INTERNAL MEDICINE

## 2019-01-07 PROCEDURE — 3078F PR MOST RECENT DIASTOLIC BLOOD PRESSURE < 80 MM HG: ICD-10-PCS | Mod: ,,, | Performed by: INTERNAL MEDICINE

## 2019-01-07 PROCEDURE — 3079F DIAST BP 80-89 MM HG: CPT | Mod: ,,, | Performed by: INTERNAL MEDICINE

## 2019-01-07 PROCEDURE — 3044F HG A1C LEVEL LT 7.0%: CPT | Mod: ,,, | Performed by: INTERNAL MEDICINE

## 2019-01-07 PROCEDURE — 99214 OFFICE O/P EST MOD 30 MIN: CPT | Mod: ,,, | Performed by: INTERNAL MEDICINE

## 2019-01-07 PROCEDURE — 1101F PR PT FALLS ASSESS DOC 0-1 FALLS W/OUT INJ PAST YR: ICD-10-PCS | Mod: ,,, | Performed by: INTERNAL MEDICINE

## 2019-01-07 PROCEDURE — 3075F PR MOST RECENT SYSTOLIC BLOOD PRESS GE 130-139MM HG: ICD-10-PCS | Mod: ,,, | Performed by: INTERNAL MEDICINE

## 2019-01-07 PROCEDURE — 3078F DIAST BP <80 MM HG: CPT | Mod: ,,, | Performed by: INTERNAL MEDICINE

## 2019-01-07 PROCEDURE — 99214 PR OFFICE/OUTPT VISIT, EST, LEVL IV, 30-39 MIN: ICD-10-PCS | Mod: ,,, | Performed by: INTERNAL MEDICINE

## 2019-01-07 PROCEDURE — 99204 OFFICE O/P NEW MOD 45 MIN: CPT | Mod: ,,, | Performed by: INTERNAL MEDICINE

## 2019-01-07 PROCEDURE — 99204 PR OFFICE/OUTPT VISIT, NEW, LEVL IV, 45-59 MIN: ICD-10-PCS | Mod: ,,, | Performed by: INTERNAL MEDICINE

## 2019-01-07 RX ORDER — DICYCLOMINE HYDROCHLORIDE 10 MG/1
10 CAPSULE ORAL 3 TIMES DAILY
Qty: 90 CAPSULE | Refills: 2 | Status: SHIPPED | OUTPATIENT
Start: 2019-01-07 | End: 2019-01-28 | Stop reason: SDUPTHER

## 2019-01-07 RX ORDER — CALCITRIOL 0.25 UG/1
0.25 CAPSULE ORAL DAILY
Qty: 90 CAPSULE | Refills: 1 | Status: SHIPPED | OUTPATIENT
Start: 2019-01-07 | End: 2019-02-28 | Stop reason: SDUPTHER

## 2019-01-07 RX ORDER — SODIUM BICARBONATE 650 MG/1
650 TABLET ORAL 3 TIMES DAILY
Refills: 2 | Status: ON HOLD | COMMUNITY
Start: 2018-11-17 | End: 2020-03-06 | Stop reason: HOSPADM

## 2019-01-07 RX ORDER — POTASSIUM CHLORIDE 20 MEQ/15ML
20 SOLUTION ORAL DAILY
Qty: 3800 ML | Refills: 1 | Status: SHIPPED | OUTPATIENT
Start: 2019-01-07 | End: 2019-04-07

## 2019-01-07 RX ORDER — ERGOCALCIFEROL 1.25 MG/1
50000 CAPSULE ORAL
Qty: 12 CAPSULE | Refills: 1 | Status: SHIPPED | OUTPATIENT
Start: 2019-01-07 | End: 2019-04-08 | Stop reason: SDUPTHER

## 2019-01-07 RX ORDER — PROMETHAZINE HYDROCHLORIDE 25 MG/1
25 TABLET ORAL DAILY PRN
Qty: 90 TABLET | Refills: 0 | Status: ON HOLD | OUTPATIENT
Start: 2019-01-07 | End: 2020-03-06 | Stop reason: HOSPADM

## 2019-01-07 NOTE — PROGRESS NOTES
SUBJECTIVE:    Patient ID: Shara Hutchins is a 68 y.o. female.    Chief Complaint: Anemia and Follow-up    HPI     This patient comes in for follow-up of laboratory studies. Laboratory studies done by Dr. brasher revealed to 500 with a normal differential. Complete metabolic panel indicated glucose of 98, BUN 23 and creatinine 2.31, consistent with her stage IV renal disease for which she is being followed by Dr. Prince. Her estimated GFR is 21 cc. Potassium was 3.1 by Dr. Prince and he ordered 10 mEq of potassium twice a day and is rechecking her potassium she is mildly metabolically acidotic. Alkaline phosphatase 445 and may reflect gabapentin.    She has a number of concerning illnesses including    Inflammatory bowel disease -constant diarrhea  Diabetes with neuropathy and stage IV kidney disease  Chronic back pain-chronic-estelle suggested for scoliosis ..  Hypertension- she had been on hydralazine tid when she was in hospital in October-but last week she had vision problems, felt nauseated, vomited, had diarrhea-BP was very lowshe stopped the hydralazine  Chronic iron deficiency anemia-stable  Leukopenia-we are following    She is spilling protein in the urine as a result of her diabetes with her last microalbumin measuring 87 mcg/mL. Last BUN was 23 with creatinine of 2.1 alkaline phosphatase was 146. Potassium was 3.3 and replacement was recommended. (she was hospitalized October for low magnesium)-she is on potassium 10 bid      Orders Only on 12/20/2018   Component Date Value Ref Range Status    Microalbum.,U,Random 12/20/2018 87.1* 0.0 - 19.9 mcg/ml Final    Creatinine, Random Ur 12/20/2018 77.00  mg/dl Final    Microalb Creat Ratio 12/20/2018 113* 0 - 30 Final   Orders Only on 12/20/2018   Component Date Value Ref Range Status    Glucose 12/20/2018 93  70 - 99 mg/dL Final    BUN, Bld 12/20/2018 23* 8 - 20 mg/dL Final    Creatinine 12/20/2018 2.21* 0.60 - 1.40 mg/dL Final    Calcium 12/20/2018 8.8  7.7 -  10.4 mg/dL Final    Sodium 2018 142  134 - 144 mmol/L Final    Potassium 2018 3.3* 3.5 - 5.0 mmol/L Final    Chloride 2018 110  98 - 110 mmol/L Final    CO2 2018 23.9  22.8 - 31.6 mmol/L Final    Albumin 2018 3.8  3.1 - 4.7 g/dL Final    Total Bilirubin 2018 0.7  0.3 - 1.0 mg/dL Final    Alkaline Phosphatase 2018 146* 40 - 104 IU/L Final    Total Protein 2018 6.3  6.0 - 8.2 g/dL Final    ALT (SGPT) 2018 18  3 - 33 IU/L Final    AST 2018 21  10 - 40 IU/L Final    Hemoglobin A1C 2018 4.8  3.1 - 6.5 % Final       Past Medical History:   Diagnosis Date    Chronic kidney disease, stage III (moderate) 2018    Diabetes mellitus, type 2     Fatigue     Hypertension      Social History     Socioeconomic History    Marital status:      Spouse name: Not on file    Number of children: Not on file    Years of education: Not on file    Highest education level: Not on file   Social Needs    Financial resource strain: Not on file    Food insecurity - worry: Not on file    Food insecurity - inability: Not on file    Transportation needs - medical: Not on file    Transportation needs - non-medical: Not on file   Occupational History    Not on file   Tobacco Use    Smoking status: Former Smoker     Packs/day: 1.00     Years: 33.00     Pack years: 33.00     Types: Cigarettes     Last attempt to quit: 2000     Years since quittin.4    Smokeless tobacco: Never Used   Substance and Sexual Activity    Alcohol use: No    Drug use: No    Sexual activity: Not on file   Other Topics Concern    Not on file   Social History Narrative    Not on file     Past Surgical History:   Procedure Laterality Date    ABDOMINAL SURGERY      BACK SURGERY      CERVICAL FUSION      x 4    cervix repair      CHOLECYSTECTOMY  2000    HYSTERECTOMY  1980    LUMBAR LAMINECTOMY      NECK SURGERY      SHOULDER SURGERY      vulva cancer    "    Family History   Problem Relation Age of Onset    Heart disease Mother     Hypertension Mother     Cancer Mother         lung    Heart disease Father     Heart disease Sister         Open heart surgery    No Known Problems Daughter      Vitals:    01/07/19 1125   BP: 130/78   Pulse: 74   Resp: 12   Temp: 98.4 °F (36.9 °C)   SpO2: 97%   Weight: 80.3 kg (177 lb)   Height: 5' 7" (1.702 m)       Review of Systems   Constitutional: Negative for activity change, appetite change, chills, diaphoresis, fatigue (chronic ), fever and unexpected weight change.   HENT: Positive for rhinorrhea. Negative for congestion, ear pain, hearing loss, nosebleeds, postnasal drip, sinus pressure, sinus pain, sneezing, sore throat, tinnitus, trouble swallowing and voice change.         Sinus infection now -colored mucous   Eyes: Positive for discharge. Negative for photophobia, pain, itching and visual disturbance.   Respiratory: Negative for apnea, cough, chest tightness, shortness of breath, wheezing and stridor.    Cardiovascular: Negative for chest pain, palpitations and leg swelling.   Gastrointestinal: Positive for diarrhea and vomiting. Negative for abdominal distention, abdominal pain, blood in stool, constipation and nausea.        HPI--has chronic stool leakage   Endocrine: Positive for polydipsia. Negative for cold intolerance, heat intolerance and polyuria.   Genitourinary: Negative for difficulty urinating, dyspareunia, dysuria, flank pain, frequency, hematuria, menstrual problem, pelvic pain, urgency, vaginal discharge and vaginal pain.        Chronic leakage   Musculoskeletal: Positive for back pain (HPI) and neck pain. Negative for arthralgias, joint swelling, myalgias and neck stiffness.   Skin: Negative for pallor.   Allergic/Immunologic: Negative for environmental allergies and food allergies.   Neurological: Positive for weakness. Negative for dizziness, tremors, speech difficulty, light-headedness (HPI), " numbness and headaches.   Hematological: Does not bruise/bleed easily.   Psychiatric/Behavioral: Negative for agitation, confusion, decreased concentration, dysphoric mood, sleep disturbance (cannot stay asleep) and suicidal ideas. The patient is not nervous/anxious.           Objective:      Physical Exam   Constitutional: She is oriented to person, place, and time. She appears well-developed and well-nourished. She is cooperative. No distress.   HENT:   Head: Normocephalic and atraumatic.   Right Ear: Tympanic membrane normal.   Left Ear: Tympanic membrane normal.   Mouth/Throat: Uvula is midline and mucous membranes are normal.   pallor   Eyes: Conjunctivae and EOM are normal. Right pupil is round and reactive. Left pupil is round and reactive.   Neck: Trachea normal and normal range of motion. Neck supple. No JVD present. No thyromegaly present.   Cardiovascular: Normal rate, regular rhythm, normal heart sounds and intact distal pulses.   Pulmonary/Chest: Effort normal and breath sounds normal. No tachypnea. No respiratory distress.   Abdominal: Soft. Bowel sounds are normal. There is no tenderness (MILD GENERALIZED).   BS normal but constant   Musculoskeletal: Normal range of motion. She exhibits edema (trace).   Lymphadenopathy:     She has no cervical adenopathy.   Neurological: She is alert and oriented to person, place, and time. She has normal strength.   Skin: Skin is warm and dry. No rash noted. There is pallor.   pallor   Psychiatric: She has a normal mood and affect. Her speech is normal.   Nursing note and vitals reviewed.      Protective Sensation (w/ 10 gram monofilament):  Right: Absent  Left: Absent    Visual Inspection:  Callus -  Bilateral    Pedal Pulses:   Right: Present  Left: Present    Posterior tibialis:   Right:Present  Left: Present      Assessment:       1. Diabetic polyneuropathy associated with type 2 diabetes mellitus    2. Iron deficiency anemia due to chronic blood loss    3.  Essential hypertension, benign    4. Vitamin D deficiency    5. Inflammatory bowel disease    6. Nausea    7. Leukopenia, unspecified type    8. Hypokalemia    9. Chronic renal disease, stage IV    10. Elevated alkaline phosphatase level         Plan:           Diabetic polyneuropathy associated with type 2 diabetes mellitus    Iron deficiency anemia due to chronic blood loss  -     ferrous sulfate 325 mg (65 mg iron) CpSR; Take 1 tablet by mouth once daily.  Dispense: 90 capsule; Refill: 1    Essential hypertension, benign    Vitamin D deficiency  -     ergocalciferol (ERGOCALCIFEROL) 50,000 unit Cap; Take 1 capsule (50,000 Units total) by mouth every 7 days.  Dispense: 12 capsule; Refill: 1    Inflammatory bowel disease  -     dicyclomine (BENTYL) 10 MG capsule; Take 1 capsule (10 mg total) by mouth 3 (three) times daily.  Dispense: 90 capsule; Refill: 2    Nausea  -     promethazine (PHENERGAN) 25 MG tablet; Take 1 tablet (25 mg total) by mouth daily as needed for Nausea.  Dispense: 90 tablet; Refill: 0  -     Potassium; Future; Expected date: 01/07/2019    Leukopenia, unspecified type    Hypokalemia  -     potassium chloride 10% (KAYCIEL) 20 mEq/15 mL oral solution; Take 15 mLs (20 mEq total) by mouth once daily.  Dispense: 3800 mL; Refill: 1  -     Potassium; Future; Expected date: 01/07/2019    Chronic renal disease, stage IV  -     calcitRIOL (ROCALTROL) 0.25 MCG Cap; Take 1 capsule (0.25 mcg total) by mouth once daily.  Dispense: 90 capsule; Refill: 1    Elevated alkaline phosphatase level  -     Alkaline phosphatase; Future; Expected date: 01/07/2019

## 2019-01-07 NOTE — PATIENT INSTRUCTIONS
Anemia  Anemia is a condition that occurs when your body does not have enough healthy red blood cells (RBCs). RBCs are the parts of your blood that carry oxygen throughout your body. A protein called hemoglobin allows your RBCs to absorb and release oxygen. Without enough RBCs or hemoglobin, your body doesn't get enough oxygen. Symptoms of anemia may then occur.    What are the symptoms of anemia?  Some people with anemia have no symptoms. But most people have symptoms that range from mild to severe. These can include:  · Tiredness (fatigue)  · Weakness  · Pale skin  · Shortness of breath  · Dizziness or fainting  · Rapid heartbeat  · Trouble doing normal amounts of activity  · Jaundice (yellowing of your eyes, skin, or mouth; dark urine)  What causes anemia?  Anemia can occur when your body:  · Loses too much blood  · Does not make enough RBCs  · Destroys your RBCs at a faster rate than it can replace them  · Does not make a normal amount of hemoglobin in your RBCs  These problems can occur for many reasons, including:  · A condition that you are born with (congenital or inherited), such as sickle cell disease or thalassemia  · Heavy bleeding for any reason, including injury, surgery, childbirth, or even heavy menstrual periods  · Being low in certain nutrients, such as iron, folate, or vitamin B12, possibly from a poor diet or a condition like celiac disease or Crohn's disease  · Certain chronic conditions like diabetes, arthritis, or kidney disease  · Certain chronic infections like tuberculosis or HIV  · Exposure to certain medicines, such as those used for chemotherapy  There are different types of anemia. Your healthcare provider can tell you more about the type of anemia you have and what may have caused it.  How is anemia diagnosed?  To diagnose anemia, your healthcare provider orders blood tests. These can include:  · Complete blood cell count (CBC). This test measures the amounts of the different types  of blood cells.  · Blood smear. This test checks the size and shape of your blood cells. To do the test, a drop of your blood is viewed under a microscope. A stain is used to make the blood cells easier to see.  · Iron studies. These tests measure the amount of iron in your blood. Your body needs iron to make hemoglobin in your RBCs.  · Vitamin B12 and folate studies. These tests check for some of the components that help give RBCs a normal size and shape.  · Reticulocyte count. This test measures the amount of new RBCs that your bone marrow makes.  · Hemoglobin electrophoresis. This test checks for problems with your hemoglobin in RBCs.  How is anemia treated?  Treatment for anemia is based on the type of anemia, its cause, and the severity of your symptoms. Treatments may include:  · Diet changes. This involves increasing the amount of certain nutrients in your diet, such as iron, vitamin B12, or folate. Your healthcare provider may also prescribe nutrient supplements.  · Medicines. Certain medicines treat the cause of your anemia. Others help build new RBCs or relieve symptoms. If a medicine is the cause of your anemia, you may need to stop or change it.  · Blood transfusions. Replacing some of your blood can increase the number of healthy RBCs in your body.  · Surgery. In some cases, your doctor may do surgery to treat the underlying cause of anemia. If you need surgery, your healthcare provider will explain the procedure and outline the risks and benefits for you.  What are the long-term concerns?  If you have a certain type of anemia, you can expect a full recovery after treatment. If you have other types of anemia (especially a type you're born with), you will need to manage it for life. Your doctor can tell you more.  Date Last Reviewed: 12/1/2016  © 5281-0187 [employer]. [address] [place] [address], [place] [address]. All rights reserved. This information is not intended as a substitute for  professional medical care. Always follow your healthcare professional's instructions.

## 2019-01-07 NOTE — LETTER
January 7, 2019      Soham Lopez, CHIN,FNP-C  [address] [facility] [address] [facility]  [place] [address]           [facility] - Hematology Oncology  [address] [facility]  [address]  [place] [address]  Phone: [phone]  Fax: [phone]          Patient: Shara Hutchins   MR Number: [id number]   YOB: 1950   Date of Visit: 1/7/2019       Dear Soham Lopez:    Thank you for referring Shara Hutchins to me for evaluation. Attached you will find relevant portions of my assessment and plan of care.    If you have questions, please do not hesitate to call me. I look forward to following Shara Hutchins along with you.    Sincerely,    OCTAVIA Frank MD    Enclosure  CC:  No Recipients    If you would like to receive this communication electronically, please contact [email or web address] or [phone] to request more information on [employer] Link access.    For providers and/or their staff who would like to refer a patient to [facility], please contact us through our one-stop-shop provider referral line, [facility], at [phone].    If you feel you have received this communication in error or would no longer like to receive these types of communications, please e-mail [email or web address]

## 2019-01-08 NOTE — PROGRESS NOTES
[facility] History & Physical    Subjective:      Patient ID:   Shara Hutchins  68 y.o. female  1950  Katya Horton      Chief Complaint:   anemia    HPI:  68 y.o. female with anemia, admits to fatigue, denies ALLRED.  No transfusion hx.  Hospitalized 10/2018 for multiple electrolyte abnormalities.  B 12 < 150.    Hx vulva cancer. 10cm area removed with surgery, followed by concurrent  Cisplatin and Rad Rx.  [place], [place] initially and   Completed here in [place].    Hx DM, no insulin, HTN, peripheral neuropathy, ch renal dx, IBS.    Hx wide excision of vulva area, cholecystectomy, hysterectomy,  C spine fusion x's 4, back surgery x's 1, shoulder surgery.    Allergy PCN, Cipro.  Smoke 1 ppd x's 33 years, none since 2000.  ETOH no.    Mom heart dx, HTN, Cancer.  Dad heart dx.  Sister heart dx.    ROS:   GEN: normal without any fever, night sweats or weight loss  HEENT: normal with no HA's, sore throat, stiff neck, changes in vision  CV: normal with no CP, SOB, PND, ALLRED or orthopnea  PULM: normal with no SOB, cough, hemoptysis, sputum or pleuritic pain  GI: normal with no abdominal pain, nausea, vomiting, constipation, diarrhea, melanotic stools, BRBPR, or hematemesis  : See HPI  BREAST: normal with no mass, discharge, pain  SKIN: See HPI     Past Medical History:   Diagnosis Date    Chronic kidney disease, stage III (moderate) 1/7/2018    Diabetes mellitus, type 2     Fatigue     Hypertension      Past Surgical History:   Procedure Laterality Date    ABDOMINAL SURGERY      BACK SURGERY      CERVICAL FUSION      x 4    cervix repair      CHOLECYSTECTOMY  2000    HYSTERECTOMY  1980    LUMBAR LAMINECTOMY      NECK SURGERY      SHOULDER SURGERY      vulva cancer         Review of patient's allergies indicates:   Allergen Reactions    Pcn [penicillins] Anaphylaxis    Ciprofloxacin      Elevated liver enzymes       Social History     Socioeconomic History    Marital status:       Spouse name: Not on file    Number of children: Not on file    Years of education: Not on file    Highest education level: Not on file   Social Needs    Financial resource strain: Not on file    Food insecurity - worry: Not on file    Food insecurity - inability: Not on file    Transportation needs - medical: Not on file    Transportation needs - non-medical: Not on file   Occupational History    Not on file   Tobacco Use    Smoking status: Former Smoker     Packs/day: 1.00     Years: 33.00     Pack years: 33.00     Types: Cigarettes     Last attempt to quit: 2000     Years since quittin.4    Smokeless tobacco: Never Used   Substance and Sexual Activity    Alcohol use: No    Drug use: No    Sexual activity: Not on file   Other Topics Concern    Not on file   Social History Narrative    Not on file         Current Outpatient Medications:     blood sugar diagnostic (BLOOD GLUCOSE TEST) Strp, 1 strip by Misc.(Non-Drug; Combo Route) route 3 (three) times daily., Disp: 300 strip, Rfl: 1    blood-glucose meter (EMBRACE BLOOD GLUCOSE SYSTEM) Misc, Check sugar three times daily, Disp: 1 each, Rfl: 0    calcitRIOL (ROCALTROL) 0.25 MCG Cap, Take 1 capsule (0.25 mcg total) by mouth once daily., Disp: 90 capsule, Rfl: 1    cyclobenzaprine (FLEXERIL) 10 MG tablet, Take 1 tablet (10 mg total) by mouth 2 (two) times daily., Disp: 180 tablet, Rfl: 1    dicyclomine (BENTYL) 10 MG capsule, Take 1 capsule (10 mg total) by mouth 3 (three) times daily., Disp: 90 capsule, Rfl: 2    diltiaZEM (CARDIZEM CD) 120 MG Cp24, Take 1 capsule (120 mg total) by mouth once daily., Disp: 90 capsule, Rfl: 0    ergocalciferol (ERGOCALCIFEROL) 50,000 unit Cap, Take 1 capsule (50,000 Units total) by mouth every 7 days., Disp: 12 capsule, Rfl: 1    ferrous sulfate 325 mg (65 mg iron) CpSR, Take 1 tablet by mouth once daily., Disp: 90 capsule, Rfl: 1    gabapentin (NEURONTIN) 400 MG capsule, Take 2 capsules  "(800 mg total) by mouth every evening., Disp: 180 capsule, Rfl: 1    GATTEX 30-VIAL 5 mg Kit, , Disp: , Rfl:     lancets (EMBRACE LANCETS) 30 gauge Misc, by Misc.(Non-Drug; Combo Route) route 3 (three) times daily., Disp: , Rfl:     magnesium oxide (MAG-OX) 400 mg tablet, Take 1 tablet (400 mg total) by mouth once daily. (Patient taking differently: Take 400 mg by mouth 2 (two) times daily. ), Disp: 90 tablet, Rfl: 1    potassium chloride 10% (KAYCIEL) 20 mEq/15 mL oral solution, Take 15 mLs (20 mEq total) by mouth once daily., Disp: 3800 mL, Rfl: 1    promethazine (PHENERGAN) 25 MG tablet, Take 1 tablet (25 mg total) by mouth daily as needed for Nausea., Disp: 90 tablet, Rfl: 0    sodium bicarbonate 650 MG tablet, Take 650 mg by mouth 2 (two) times daily., Disp: , Rfl: 2    SYNTHROID 88 mcg tablet, Take 1 tablet (88 mcg total) by mouth Daily., Disp: 90 tablet, Rfl: 1    syringe with needle (SYRINGE 3CC/25GX1") 3 mL 25 gauge x 1" Syrg, 1 Syringe by Misc.(Non-Drug; Combo Route) route once a week., Disp: 100 Syringe, Rfl: 0    traMADol (ULTRAM) 50 mg tablet, Take 1 tablet by mouth once daily., Disp: , Rfl:           Objective:   Vitals:  Blood pressure 138/85, pulse 83, temperature 98.3 °F (36.8 °C), resp. rate 20, height 5' 6" (1.676 m), weight 81.1 kg (178 lb 12.8 oz).    Physical Examination:   GEN: no apparent distress, comfortable  HEAD: atraumatic and normocephalic  EYES: no pallor, no icterus  ENT:  no pharyngeal erythema, external ears WNL; no nasal discharge; no thrush  NECK: no masses, thyroid normal, trachea midline, no LAD/LN's, supple  CV: RRR with no murmur; normal pulse; normal S1 and S2; no pedal edema  CHEST: Normal respiratory effort; CTAB; normal breath sounds; no wheeze or crackles  ABDOM: nontender and nondistended; soft; no rebound/guarding, l/s np.  MUSC/Skeletal: ROM normal; no crepitus; joints normal; no deformities  EXTREM: no clubbing, cyanosis, inflammation or swelling  SKIN: no " rashes, lesions, ulcers, petechiae   : no cvat  NEURO: grossly intact; motor/sensory WNL;  no tremors  PSYCH: normal mood, affect and behavior  LYMPH: normal cervical, supraclavicular, axillary and groin LN's  BREASTS: no palpable mass at l or r breast.    Labs:   Lab Results   Component Value Date    WBC 6.6 01/22/2018    HGB 9.9 (L) 01/22/2018    HCT 29.4 (L) 01/22/2018     01/22/2018    CMP  Sodium   Date Value Ref Range Status   12/20/2018 142 134 - 144 mmol/L      Potassium   Date Value Ref Range Status   12/20/2018 3.3 (L) 3.5 - 5.0 mmol/L      Chloride   Date Value Ref Range Status   12/20/2018 110 98 - 110 mmol/L      CO2   Date Value Ref Range Status   12/20/2018 23.9 22.8 - 31.6 mmol/L      Glucose   Date Value Ref Range Status   12/20/2018 93 70 - 99 mg/dL      BUN, Bld   Date Value Ref Range Status   12/20/2018 23 (H) 8 - 20 mg/dL      Creatinine   Date Value Ref Range Status   12/20/2018 2.21 (H) 0.60 - 1.40 mg/dL      Calcium   Date Value Ref Range Status   12/20/2018 8.8 7.7 - 10.4 mg/dL      Total Protein   Date Value Ref Range Status   12/20/2018 6.3 6.0 - 8.2 g/dL      Albumin   Date Value Ref Range Status   12/20/2018 3.8 3.1 - 4.7 g/dL      Total Bilirubin   Date Value Ref Range Status   12/20/2018 0.7 0.3 - 1.0 mg/dL      Alkaline Phosphatase   Date Value Ref Range Status   12/20/2018 146 (H) 40 - 104 IU/L      AST   Date Value Ref Range Status   12/20/2018 21 10 - 40 IU/L      ALT   Date Value Ref Range Status   04/19/2018 9 0 - 32 IU/L Final     eGFR if non    Date Value Ref Range Status   04/19/2018 15 (L) >59 mL/min/1.73 Final         Assessment:   (1) 68 y.o. female with diagnosis of anemia and renal insufficiency.       Check vitamin and Fe status, renal function, gammopathy evaluation.       May come to procrit trial.  Reviewed 24 hour unine collection.    (2)Vulvar cancer hx, S/P surgery, Rad rx, and chemotherapy.    RTC 2-3 weeks.  Plan:       Diabetic  polyneuropathy associated with type 2 diabetes mellitus  -     Vitamin B12; Future; Expected date: 01/08/2019  -     Folate; Future; Expected date: 01/08/2019  -     Comprehensive metabolic panel; Future; Expected date: 01/08/2019    Anemia of chronic renal failure, stage 3 (moderate)  -     CBC auto differential; Future; Expected date: 01/08/2019  -     Reticulocytes; Future; Expected date: 01/08/2019  -     Erythropoietin; Future; Expected date: 01/08/2019  -     Immunoglobulins (IgG, IgA, IgM) Quantitative; Future; Expected date: 01/08/2019  -     Protein electrophoresis, serum; Future; Expected date: 01/08/2019  -     Immunofixation electrophoresis; Future; Expected date: 01/08/2019  -     Urine Protein Electrophoresis with MILY 24 Hours; Future; Expected date: 01/08/2019  -     [place]; Future; Expected date: 01/08/2019  -     Comprehensive metabolic panel; Future; Expected date: 01/08/2019    Iron deficiency anemia secondary to inadequate dietary iron intake  -     Ferritin; Future; Expected date: 01/08/2019  -     Comprehensive metabolic panel; Future; Expected date: 01/08/2019    Anemia of decreased vitamin B12 absorption  -     Vitamin B12; Future; Expected date: 01/08/2019  -     Folate; Future; Expected date: 01/08/2019      Follow-up in about 2 weeks (around 1/21/2019) for check of blood status after therapy.    I have explained and the patient understands all of  the current recommendation(s). I have answered all of their questions to the best of my ability and to their complete satisfaction.

## 2019-01-09 LAB
ALBUMIN SERPL-MCNC: 3.9 G/DL (ref 3.1–4.7)
ALP SERPL-CCNC: 142 IU/L (ref 40–104)
ALT (SGPT): 16 IU/L (ref 3–33)
AST SERPL-CCNC: 20 IU/L (ref 10–40)
BASOPHILS NFR BLD: 0 K/UL (ref 0–0.2)
BASOPHILS NFR BLD: 0.4 %
BILIRUB SERPL-MCNC: 0.6 MG/DL (ref 0.3–1)
BUN SERPL-MCNC: 25 MG/DL (ref 8–20)
CALCIUM SERPL-MCNC: 9 MG/DL (ref 7.7–10.4)
CHLORIDE: 115 MMOL/L (ref 98–110)
CO2 SERPL-SCNC: 20.1 MMOL/L (ref 22.8–31.6)
CREATININE: 2.45 MG/DL (ref 0.6–1.4)
EOSINOPHIL NFR BLD: 0.3 K/UL (ref 0–0.7)
EOSINOPHIL NFR BLD: 4.6 %
ERYTHROCYTE [DISTWIDTH] IN BLOOD BY AUTOMATED COUNT: 13.1 % (ref 11.7–14.9)
FERRITIN SERPL-MCNC: 70 NG/ML (ref 24–162)
FOLATE SERPL-MCNC: 12.5 NG/ML (ref 2.2–11.2)
GLUCOSE: 100 MG/DL (ref 70–99)
GRAN #: 3.5 K/UL (ref 1.4–6.5)
GRAN%: 65.4 %
HCT VFR BLD AUTO: 31.6 % (ref 36–48)
HGB BLD-MCNC: 10.1 G/DL (ref 12–15)
IMMATURE GRANS (ABS): 0 K/UL (ref 0–1)
IMMATURE GRANULOCYTES: 0.2 %
LYMPH #: 1.3 K/UL (ref 1.2–3.4)
LYMPH%: 23.1 %
MCH RBC QN AUTO: 31.6 PG (ref 25–35)
MCHC RBC AUTO-ENTMCNC: 32 G/DL (ref 31–36)
MCV RBC AUTO: 98.8 FL (ref 79–98)
MONO #: 0.3 K/UL (ref 0.1–0.6)
MONO%: 6.3 %
NUCLEATED RBCS: 0 %
PLATELET # BLD AUTO: 222 K/UL (ref 140–440)
PMV BLD AUTO: 10.5 FL (ref 8.8–12.7)
POTASSIUM SERPL-SCNC: 3.1 MMOL/L (ref 3.5–5)
PROT SERPL-MCNC: 6.4 G/DL (ref 6–8.2)
RBC # BLD AUTO: 3.2 M/UL (ref 3.5–5.5)
SODIUM: 143 MMOL/L (ref 134–144)
VITAMIN B12: 372 PG/ML (ref 62–940)
WBC # BLD AUTO: 5.4 K/UL (ref 5–10)

## 2019-01-10 LAB
KAPPA FREE LIGHT CHAINS: 27.1 MG/L (ref 3.3–19.4)
KAPPA/LAMBDA RATIO, S: 1.16 (ref 0.26–1.65)
LAMBDA FREE LIGHT CHAINS: 23.3 MG/L (ref 5.7–26.3)

## 2019-01-11 ENCOUNTER — PATIENT MESSAGE (OUTPATIENT)
Dept: FAMILY MEDICINE | Facility: CLINIC | Age: 69
End: 2019-01-11

## 2019-01-14 LAB
ALBUMIN SERPL-MCNC: 3.4 G/DL (ref 2.9–4.4)
ALBUMIN/GLOB SERPL ELPH: 1.4 {RATIO} (ref 0.7–1.7)
ALPHA 1 GLOBULIN/PROTEIN TOTAL: 0.2 G/DL (ref 0–0.4)
ALPHA 2 GLOBULIN/PROTEIN TOTAL: 0.8 G/DL (ref 0.4–1)
B-GLOBULIN FLD ELPH-MCNC: 0.9 G/DL (ref 0.7–1.3)
GAMMA GLOB FLD ELPH-MCNC: 0.6 G/DL (ref 0.4–1.8)
GLOBULIN SER CALC-MCNC: 2.5 G/DL (ref 2.2–3.9)
Lab: ABNORMAL
M PROTEIN MFR UR ELPH: ABNORMAL G/DL
PROT SERPL-MCNC: 5.9 G/DL (ref 6–8.5)

## 2019-01-16 ENCOUNTER — TELEPHONE (OUTPATIENT)
Dept: FAMILY MEDICINE | Facility: CLINIC | Age: 69
End: 2019-01-16

## 2019-01-16 NOTE — TELEPHONE ENCOUNTER
----- Message from April Horton MD sent at 1/16/2019  9:44 AM CST -----  Test results she has proteins in urine as expected but no abnormal ones I was looking for

## 2019-01-21 ENCOUNTER — OFFICE VISIT (OUTPATIENT)
Dept: HEMATOLOGY/ONCOLOGY | Facility: CLINIC | Age: 69
End: 2019-01-21
Payer: MEDICARE

## 2019-01-21 VITALS
BODY MASS INDEX: 29.18 KG/M2 | RESPIRATION RATE: 20 BRPM | TEMPERATURE: 98 F | SYSTOLIC BLOOD PRESSURE: 132 MMHG | DIASTOLIC BLOOD PRESSURE: 66 MMHG | HEART RATE: 67 BPM | WEIGHT: 180.81 LBS

## 2019-01-21 DIAGNOSIS — N18.4 STAGE 4 CHRONIC KIDNEY DISEASE: Primary | ICD-10-CM

## 2019-01-21 DIAGNOSIS — D51.8 ANEMIA OF DECREASED VITAMIN B12 ABSORPTION: ICD-10-CM

## 2019-01-21 PROCEDURE — 96372 PR INJECTION,THERAP/PROPH/DIAG2ST, IM OR SUBCUT: ICD-10-PCS | Mod: ,,, | Performed by: INTERNAL MEDICINE

## 2019-01-21 PROCEDURE — 1101F PT FALLS ASSESS-DOCD LE1/YR: CPT | Mod: ,,, | Performed by: INTERNAL MEDICINE

## 2019-01-21 PROCEDURE — 99213 OFFICE O/P EST LOW 20 MIN: CPT | Mod: 25,,, | Performed by: INTERNAL MEDICINE

## 2019-01-21 PROCEDURE — 3075F PR MOST RECENT SYSTOLIC BLOOD PRESS GE 130-139MM HG: ICD-10-PCS | Mod: ,,, | Performed by: INTERNAL MEDICINE

## 2019-01-21 PROCEDURE — 3075F SYST BP GE 130 - 139MM HG: CPT | Mod: ,,, | Performed by: INTERNAL MEDICINE

## 2019-01-21 PROCEDURE — 96372 THER/PROPH/DIAG INJ SC/IM: CPT | Mod: ,,, | Performed by: INTERNAL MEDICINE

## 2019-01-21 PROCEDURE — 3078F PR MOST RECENT DIASTOLIC BLOOD PRESSURE < 80 MM HG: ICD-10-PCS | Mod: ,,, | Performed by: INTERNAL MEDICINE

## 2019-01-21 PROCEDURE — 3078F DIAST BP <80 MM HG: CPT | Mod: ,,, | Performed by: INTERNAL MEDICINE

## 2019-01-21 PROCEDURE — 99213 PR OFFICE/OUTPT VISIT, EST, LEVL III, 20-29 MIN: ICD-10-PCS | Mod: 25,,, | Performed by: INTERNAL MEDICINE

## 2019-01-21 PROCEDURE — 1101F PR PT FALLS ASSESS DOC 0-1 FALLS W/OUT INJ PAST YR: ICD-10-PCS | Mod: ,,, | Performed by: INTERNAL MEDICINE

## 2019-01-21 RX ORDER — CYANOCOBALAMIN 1000 UG/ML
1000 INJECTION, SOLUTION INTRAMUSCULAR; SUBCUTANEOUS
Status: CANCELLED | OUTPATIENT
Start: 2019-01-21

## 2019-01-21 RX ORDER — CYANOCOBALAMIN 1000 UG/ML
1000 INJECTION, SOLUTION INTRAMUSCULAR; SUBCUTANEOUS ONCE
Status: COMPLETED | OUTPATIENT
Start: 2019-01-21 | End: 2019-01-21

## 2019-01-21 RX ADMIN — CYANOCOBALAMIN 1000 MCG: 1000 INJECTION, SOLUTION INTRAMUSCULAR; SUBCUTANEOUS at 11:01

## 2019-01-21 NOTE — LETTER
January 21, 2019      April Horton MD  [address]  [place] [address]           [facility]  [address]  [address]  [place] [address]  Phone: [phone]  Fax: [phone]          Patient: Shara Hutchins   MR Number: [id number]   YOB: 1950   Date of Visit: 1/21/2019       Dear Dr. April Horton:    Thank you for referring Shara Hutchins to me for evaluation. Attached you will find relevant portions of my assessment and plan of care.    If you have questions, please do not hesitate to call me. I look forward to following Shara Hutchins along with you.    Sincerely,    OCTAVIA Frank MD    Enclosure  CC:  No Recipients    If you would like to receive this communication electronically, please contact [email or web address] or [phone] to request more information on [employer] Link access.    For providers and/or their staff who would like to refer a patient to [facility], please contact us through our one-stop-shop provider referral line, [facility] , at [phone].    If you feel you have received this communication in error or would no longer like to receive these types of communications, please e-mail [email or web address]

## 2019-01-22 NOTE — PROGRESS NOTES
[facility] History & Physical    Subjective:      Patient ID:   Shara Hutchins  68 y.o. female  1950  Katya Horton      Chief Complaint:   anemia    HPI:  68 y.o. female with anemia, admits to fatigue, denies ALLRED.  No transfusion hx.  Hospitalized 10/2018 for multiple electrolyte abnormalities.  B 12 < 150.    Hx vulva cancer. 10cm area removed with surgery, followed by concurrent  Cisplatin and Rad Rx.  [place], [place] initially and   Completed here in [place].    Hx DM, no insulin, HTN, peripheral neuropathy, ch renal dx, IBS.    Hx wide excision of vulva area, cholecystectomy, hysterectomy,  C spine fusion x's 4, back surgery x's 1, shoulder surgery.    Allergy PCN, Cipro.  Smoke 1 ppd x's 33 years, none since 2000.  ETOH no.    Mom heart dx, HTN, Cancer.  Dad heart dx.  Sister heart dx.    ROS:   GEN: normal without any fever, night sweats or weight loss  HEENT: normal with no HA's, sore throat, stiff neck, changes in vision  CV: normal with no CP, SOB, PND, ALLRED or orthopnea  PULM: normal with no SOB, cough, hemoptysis, sputum or pleuritic pain  GI: normal with no abdominal pain, nausea, vomiting, constipation, diarrhea, melanotic stools, BRBPR, or hematemesis  : See HPI  BREAST: normal with no mass, discharge, pain  SKIN: See HPI     Past Medical History:   Diagnosis Date    Chronic kidney disease, stage III (moderate) 1/7/2018    Diabetes mellitus, type 2     Fatigue     Hypertension      Past Surgical History:   Procedure Laterality Date    ABDOMINAL SURGERY      BACK SURGERY      CERVICAL FUSION      x 4    cervix repair      CHOLECYSTECTOMY  2000    HYSTERECTOMY  1980    LUMBAR LAMINECTOMY      NECK SURGERY      SHOULDER SURGERY      vulva cancer         Review of patient's allergies indicates:   Allergen Reactions    Pcn [penicillins] Anaphylaxis    Ciprofloxacin      Elevated liver enzymes       Social History     Socioeconomic History    Marital status:       Spouse name: Not on file    Number of children: Not on file    Years of education: Not on file    Highest education level: Not on file   Social Needs    Financial resource strain: Not on file    Food insecurity - worry: Not on file    Food insecurity - inability: Not on file    Transportation needs - medical: Not on file    Transportation needs - non-medical: Not on file   Occupational History    Not on file   Tobacco Use    Smoking status: Former Smoker     Packs/day: 1.00     Years: 33.00     Pack years: 33.00     Types: Cigarettes     Last attempt to quit: 2000     Years since quittin.5    Smokeless tobacco: Never Used   Substance and Sexual Activity    Alcohol use: No    Drug use: No    Sexual activity: Not on file   Other Topics Concern    Not on file   Social History Narrative    Not on file         Current Outpatient Medications:     blood sugar diagnostic (BLOOD GLUCOSE TEST) Strp, 1 strip by Misc.(Non-Drug; Combo Route) route 3 (three) times daily., Disp: 300 strip, Rfl: 1    blood-glucose meter (EMBRACE BLOOD GLUCOSE SYSTEM) Misc, Check sugar three times daily, Disp: 1 each, Rfl: 0    calcitRIOL (ROCALTROL) 0.25 MCG Cap, Take 1 capsule (0.25 mcg total) by mouth once daily., Disp: 90 capsule, Rfl: 1    cyclobenzaprine (FLEXERIL) 10 MG tablet, Take 1 tablet (10 mg total) by mouth 2 (two) times daily., Disp: 180 tablet, Rfl: 1    dicyclomine (BENTYL) 10 MG capsule, Take 1 capsule (10 mg total) by mouth 3 (three) times daily., Disp: 90 capsule, Rfl: 2    diltiaZEM (CARDIZEM CD) 120 MG Cp24, Take 1 capsule (120 mg total) by mouth once daily., Disp: 90 capsule, Rfl: 0    ergocalciferol (ERGOCALCIFEROL) 50,000 unit Cap, Take 1 capsule (50,000 Units total) by mouth every 7 days., Disp: 12 capsule, Rfl: 1    ferrous sulfate 325 mg (65 mg iron) CpSR, Take 1 tablet by mouth once daily., Disp: 90 capsule, Rfl: 1    gabapentin (NEURONTIN) 400 MG capsule, Take 2 capsules  "(800 mg total) by mouth every evening., Disp: 180 capsule, Rfl: 1    GATTEX 30-VIAL 5 mg Kit, , Disp: , Rfl:     lancets (EMBRACE LANCETS) 30 gauge Misc, by Misc.(Non-Drug; Combo Route) route 3 (three) times daily., Disp: , Rfl:     magnesium oxide (MAG-OX) 400 mg tablet, Take 1 tablet (400 mg total) by mouth once daily. (Patient taking differently: Take 400 mg by mouth 2 (two) times daily. ), Disp: 90 tablet, Rfl: 1    potassium chloride 10% (KAYCIEL) 20 mEq/15 mL oral solution, Take 15 mLs (20 mEq total) by mouth once daily., Disp: 3800 mL, Rfl: 1    promethazine (PHENERGAN) 25 MG tablet, Take 1 tablet (25 mg total) by mouth daily as needed for Nausea., Disp: 90 tablet, Rfl: 0    sodium bicarbonate 650 MG tablet, Take 650 mg by mouth 2 (two) times daily., Disp: , Rfl: 2    SYNTHROID 88 mcg tablet, Take 1 tablet (88 mcg total) by mouth Daily., Disp: 90 tablet, Rfl: 1    syringe with needle (SYRINGE 3CC/25GX1") 3 mL 25 gauge x 1" Syrg, 1 Syringe by Misc.(Non-Drug; Combo Route) route once a week., Disp: 100 Syringe, Rfl: 0    traMADol (ULTRAM) 50 mg tablet, Take 1 tablet by mouth once daily., Disp: , Rfl:   No current facility-administered medications for this visit.           Objective:   Vitals:  Blood pressure 132/66, pulse 67, temperature 98.2 °F (36.8 °C), resp. rate 20, weight 82 kg (180 lb 12.8 oz).    Physical Examination:   GEN: no apparent distress, comfortable  HEAD: atraumatic and normocephalic  EYES: no pallor, no icterus  ENT:  no pharyngeal erythema, external ears WNL; no nasal discharge; no thrush  NECK: no masses, thyroid normal, trachea midline, no LAD/LN's, supple  CV: RRR with no murmur; normal pulse; normal S1 and S2; no pedal edema  CHEST: Normal respiratory effort; CTAB; normal breath sounds; no wheeze or crackles  ABDOM: nontender and nondistended; soft; no rebound/guarding, l/s np.  MUSC/Skeletal: ROM normal; no crepitus; joints normal; no deformities  EXTREM: no clubbing, cyanosis, " inflammation or swelling  SKIN: no rashes, lesions, ulcers, petechiae   : no cvat  NEURO: grossly intact; motor/sensory WNL;  no tremors  PSYCH: normal mood, affect and behavior  LYMPH: normal cervical, supraclavicular, axillary and groin LN's  BREASTS: no palpable mass at l or r breast.    Labs:   Lab Results   Component Value Date    WBC 5.4 01/09/2019    HGB 10.1 (L) 01/09/2019    HCT 31.6 (L) 01/09/2019    MCV 98.8 (H) 01/09/2019     01/09/2019    CMP  Sodium   Date Value Ref Range Status   01/09/2019 143 134 - 144 mmol/L      Potassium   Date Value Ref Range Status   01/09/2019 3.1 (L) 3.5 - 5.0 mmol/L      Chloride   Date Value Ref Range Status   01/09/2019 115 (H) 98 - 110 mmol/L      CO2   Date Value Ref Range Status   01/09/2019 20.1 (L) 22.8 - 31.6 mmol/L      Glucose   Date Value Ref Range Status   01/09/2019 100 (H) 70 - 99 mg/dL      BUN, Bld   Date Value Ref Range Status   01/09/2019 25 (H) 8 - 20 mg/dL      Creatinine   Date Value Ref Range Status   01/09/2019 2.45 (H) 0.60 - 1.40 mg/dL      Calcium   Date Value Ref Range Status   01/09/2019 9.0 7.7 - 10.4 mg/dL      Total Protein   Date Value Ref Range Status   01/09/2019 5.9 (L) 6.0 - 8.5 g/dL    01/09/2019 6.4 6.0 - 8.2 g/dL      Albumin   Date Value Ref Range Status   01/09/2019 3.4 2.9 - 4.4 g/dL    01/09/2019 3.9 3.1 - 4.7 g/dL      Total Bilirubin   Date Value Ref Range Status   01/09/2019 0.6 0.3 - 1.0 mg/dL      Alkaline Phosphatase   Date Value Ref Range Status   01/09/2019 142 (H) 40 - 104 IU/L      AST   Date Value Ref Range Status   01/09/2019 20 10 - 40 IU/L      ALT   Date Value Ref Range Status   04/19/2018 9 0 - 32 IU/L Final     eGFR if non    Date Value Ref Range Status   04/19/2018 15 (L) >59 mL/min/1.73 Final         Assessment:   (1) 68 y.o. female with diagnosis of anemia and renal insufficiency.       Anemia 2nd renal dx and low NL B 12  372.        Creat. 2.45.  EPO 13.1.       MGUS eval. Negative.   IgG 469.  Ferritin 70.  H/H 10.1/31.6.    (2)Vulvar cancer hx, S/P surgery, Rad rx, and chemotherapy.    (3)Start B12 shot monthly    (4)check lab in 6 months.  If Hgb < 10, then start procrit trial    See me 6 months.  Plan:       Stage 4 chronic kidney disease  -     CBC auto differential; Standing  -     Comprehensive metabolic panel; Standing    Anemia of decreased vitamin B12 absorption  -     cyanocobalamin injection 1,000 mcg  -     CBC auto differential; Standing  -     Vitamin B12; Standing  -     cyanocobalamin injection 1,000 mcg      Follow-up in about 6 months (around 7/21/2019) for check of blood status after therapy.    I have explained and the patient understands all of  the current recommendation(s). I have answered all of their questions to the best of my ability and to their complete satisfaction.

## 2019-01-26 ENCOUNTER — PATIENT MESSAGE (OUTPATIENT)
Dept: FAMILY MEDICINE | Facility: CLINIC | Age: 69
End: 2019-01-26

## 2019-01-26 DIAGNOSIS — K52.9 INFLAMMATORY BOWEL DISEASE: Chronic | ICD-10-CM

## 2019-01-28 RX ORDER — DICYCLOMINE HYDROCHLORIDE 10 MG/1
10 CAPSULE ORAL 3 TIMES DAILY
Qty: 270 CAPSULE | Refills: 0 | Status: SHIPPED | OUTPATIENT
Start: 2019-01-28 | End: 2019-10-13 | Stop reason: SDUPTHER

## 2019-02-26 DIAGNOSIS — N18.4 CHRONIC RENAL DISEASE, STAGE IV: ICD-10-CM

## 2019-02-26 RX ORDER — CALCITRIOL 0.25 UG/1
0.25 CAPSULE ORAL DAILY
Qty: 90 CAPSULE | Refills: 1 | Status: CANCELLED | OUTPATIENT
Start: 2019-02-26

## 2019-02-28 DIAGNOSIS — N18.4 CHRONIC RENAL DISEASE, STAGE IV: ICD-10-CM

## 2019-02-28 RX ORDER — CALCITRIOL 0.25 UG/1
0.25 CAPSULE ORAL DAILY
Qty: 90 CAPSULE | Refills: 1 | Status: SHIPPED | OUTPATIENT
Start: 2019-02-28 | End: 2019-10-15

## 2019-04-04 DIAGNOSIS — I10 ESSENTIAL HYPERTENSION, BENIGN: Chronic | ICD-10-CM

## 2019-04-07 RX ORDER — POTASSIUM CHLORIDE 750 MG/1
TABLET, EXTENDED RELEASE ORAL
Qty: 90 TABLET | Refills: 0 | Status: SHIPPED | OUTPATIENT
Start: 2019-04-07 | End: 2019-04-08

## 2019-04-07 RX ORDER — DILTIAZEM HYDROCHLORIDE 120 MG/1
CAPSULE, EXTENDED RELEASE ORAL
Qty: 90 CAPSULE | Refills: 0 | Status: SHIPPED | OUTPATIENT
Start: 2019-04-07 | End: 2019-06-13 | Stop reason: SDUPTHER

## 2019-04-07 RX ORDER — LEVOTHYROXINE SODIUM 88 UG/1
TABLET ORAL
Qty: 90 TABLET | Refills: 1 | Status: SHIPPED | OUTPATIENT
Start: 2019-04-07 | End: 2019-10-12 | Stop reason: SDUPTHER

## 2019-04-08 ENCOUNTER — OFFICE VISIT (OUTPATIENT)
Dept: FAMILY MEDICINE | Facility: CLINIC | Age: 69
End: 2019-04-08
Payer: MEDICARE

## 2019-04-08 VITALS
TEMPERATURE: 99 F | WEIGHT: 169 LBS | HEART RATE: 84 BPM | OXYGEN SATURATION: 96 % | DIASTOLIC BLOOD PRESSURE: 80 MMHG | HEIGHT: 66 IN | RESPIRATION RATE: 14 BRPM | BODY MASS INDEX: 27.16 KG/M2 | SYSTOLIC BLOOD PRESSURE: 136 MMHG

## 2019-04-08 DIAGNOSIS — E87.6 HYPOKALEMIA: Primary | ICD-10-CM

## 2019-04-08 DIAGNOSIS — E55.9 VITAMIN D DEFICIENCY: ICD-10-CM

## 2019-04-08 DIAGNOSIS — R19.7 DIARRHEA, UNSPECIFIED TYPE: ICD-10-CM

## 2019-04-08 DIAGNOSIS — R79.0 LOW MAGNESIUM LEVEL: ICD-10-CM

## 2019-04-08 DIAGNOSIS — E78.00 PURE HYPERCHOLESTEROLEMIA: ICD-10-CM

## 2019-04-08 DIAGNOSIS — D50.0 IRON DEFICIENCY ANEMIA DUE TO CHRONIC BLOOD LOSS: Chronic | ICD-10-CM

## 2019-04-08 DIAGNOSIS — M25.50 ARTHRALGIA, UNSPECIFIED JOINT: ICD-10-CM

## 2019-04-08 PROCEDURE — 1101F PT FALLS ASSESS-DOCD LE1/YR: CPT | Mod: ,,, | Performed by: INTERNAL MEDICINE

## 2019-04-08 PROCEDURE — 99214 PR OFFICE/OUTPT VISIT, EST, LEVL IV, 30-39 MIN: ICD-10-PCS | Mod: ,,, | Performed by: INTERNAL MEDICINE

## 2019-04-08 PROCEDURE — 3075F SYST BP GE 130 - 139MM HG: CPT | Mod: ,,, | Performed by: INTERNAL MEDICINE

## 2019-04-08 PROCEDURE — 3075F PR MOST RECENT SYSTOLIC BLOOD PRESS GE 130-139MM HG: ICD-10-PCS | Mod: ,,, | Performed by: INTERNAL MEDICINE

## 2019-04-08 PROCEDURE — 3079F PR MOST RECENT DIASTOLIC BLOOD PRESSURE 80-89 MM HG: ICD-10-PCS | Mod: ,,, | Performed by: INTERNAL MEDICINE

## 2019-04-08 PROCEDURE — 1111F DSCHRG MED/CURRENT MED MERGE: CPT | Mod: ,,, | Performed by: INTERNAL MEDICINE

## 2019-04-08 PROCEDURE — 99214 OFFICE O/P EST MOD 30 MIN: CPT | Mod: ,,, | Performed by: INTERNAL MEDICINE

## 2019-04-08 PROCEDURE — 3079F DIAST BP 80-89 MM HG: CPT | Mod: ,,, | Performed by: INTERNAL MEDICINE

## 2019-04-08 PROCEDURE — 1111F PR DISCHARGE MEDS RECONCILED W/ CURRENT OUTPATIENT MED LIST: ICD-10-PCS | Mod: ,,, | Performed by: INTERNAL MEDICINE

## 2019-04-08 PROCEDURE — 1101F PR PT FALLS ASSESS DOC 0-1 FALLS W/OUT INJ PAST YR: ICD-10-PCS | Mod: ,,, | Performed by: INTERNAL MEDICINE

## 2019-04-08 RX ORDER — ERGOCALCIFEROL 1.25 MG/1
50000 CAPSULE ORAL
Qty: 12 CAPSULE | Refills: 1 | Status: SHIPPED | OUTPATIENT
Start: 2019-04-08 | End: 2019-07-10

## 2019-04-08 RX ORDER — CYANOCOBALAMIN 1000 UG/ML
INJECTION, SOLUTION INTRAMUSCULAR; SUBCUTANEOUS
Refills: 4 | COMMUNITY
Start: 2019-02-21 | End: 2019-07-10

## 2019-04-08 RX ORDER — TRAMADOL HYDROCHLORIDE 50 MG/1
50 TABLET ORAL DAILY
Qty: 42 TABLET | Refills: 0 | Status: SHIPPED | OUTPATIENT
Start: 2019-04-08 | End: 2019-07-10 | Stop reason: SDUPTHER

## 2019-04-08 NOTE — LETTER
April 8, 2019      [facility]  [address]  [facility] [address]  Phone: [phone]  Fax: [phone]       Patient: Shara Hutchins   YOB: 1950  Date of Visit: 04/08/2019    To Whom It May Concern:    Maryjo Hutchins  was at [facility] on 04/08/2019. She may return to work/school on 04/15/2019 with no restrictions. If you have any questions or concerns, or if I can be of further assistance, please do not hesitate to contact me.    Sincerely,    Denise Baron LPN

## 2019-04-08 NOTE — PROGRESS NOTES
"  SUBJECTIVE:    Patient ID: Shara Hutchins is a 69 y.o. female.    Chief Complaint: Follow-up (low magnesium) and Hospital Follow Up    HPI     Patient didn't with a history of chronic renal insufficiency and short gut syndrome with chronic diarrhea, admitted to the hospital by nephrology for increasing weakness post treatment for urinary tract infection with Macrobid. Has a history of chronic renal disease with bilateral nonobstructing renal stones she didn't require 2 transfusions during hospitalization, and also was hydrated.No medical changes were made except potassium was discontinued.    Patient complaining of back pain in area of flanks bilaterally, different from her lower back pain.She is having severe muscle cramps especially medial thigh which awakens her in the middle of the night.    She periodically has GI bleeding ? Source but undoubtedly AVM.    Chronic diarrhea and especially now-responded to xifaxin in the past.      Last 3 sets of Vitals    Vitals - 1 value per visit 1/7/2019 1/21/2019 4/8/2019   SYSTOLIC 138 132 136   DIASTOLIC 85 66 80   PULSE 83 67 84   TEMPERATURE 98.3 98.2 98.5   RESPIRATIONS 20 20 14   SPO2 - - 96   Weight (lb) 178.8 180.8 169   Weight (kg) 81.103 82.01 76.658   HEIGHT 5' 6" - 5' 6"   BODY MASS INDEX 28.86 29.18 27.28   VISIT REPORT - - -   Pain Score  - 7 4       Orders Only on 01/09/2019   Component Date Value Ref Range Status    Kappa Free Light Chains 01/09/2019 27.1* 3.3 - 19.4 mg/L Final    Lambda Free Light Chains 01/09/2019 23.3  5.7 - 26.3 mg/L Final    Kappa/Lambda Ratio, S 01/09/2019 1.16  0.26 - 1.65 Final   Office Visit on 01/07/2019   Component Date Value Ref Range Status    WBC 01/09/2019 5.4  5.0 - 10.0 K/uL Final    RBC 01/09/2019 3.20* 3.50 - 5.50 M/uL Final    Hemoglobin 01/09/2019 10.1* 12.0 - 15.0 g/dL Final    Hematocrit 01/09/2019 31.6* 36.0 - 48.0 % Final    MCV 01/09/2019 98.8* 79.0 - 98.0 fL Final    MCH 01/09/2019 31.6  25.0 - 35.0 pg Final "    MCHC 01/09/2019 32.0  31.0 - 36.0 g/dL Final    RDW 01/09/2019 13.1  11.7 - 14.9 % Final    Platelets 01/09/2019 222  140 - 440 K/uL Final    MPV 01/09/2019 10.5  8.8 - 12.7 fL Final    Gran% 01/09/2019 65.4  % Final    Lymph% 01/09/2019 23.1  % Final    Mono% 01/09/2019 6.3  % Final    Eosinophil% 01/09/2019 4.6  % Final    Basophil% 01/09/2019 0.4  % Final    Gran # (ANC) 01/09/2019 3.5  1.4 - 6.5 K/uL Final    Lymph # 01/09/2019 1.3  1.2 - 3.4 K/uL Final    Mono # 01/09/2019 0.3  0.1 - 0.6 K/uL Final    Eos # 01/09/2019 0.3  0.0 - 0.7 K/uL Final    Baso # 01/09/2019 0.0  0.0 - 0.2 K/uL Final    Immature Grans (Abs) 01/09/2019 0.0  0.0 - 1.0 K/uL Final    Immature Granulocytes 01/09/2019 0.2  % Final    nRBC% 01/09/2019 0  % Final    Vitamin B12 01/09/2019 372  62 - 940 pg/ml Final    Folate 01/09/2019 12.5* 2.2 - 11.2 ng/mL Final    Ferritin 01/09/2019 70  24 - 162 ng/mL Final    Total Protein 01/09/2019 5.9* 6.0 - 8.5 g/dL Final    Albumin 01/09/2019 3.4  2.9 - 4.4 g/dL Final    Alpha 1 Globulin/Protein Total 01/09/2019 0.2  0.0 - 0.4 g/dL Final    Alpha 2 Globulin/Protein Total 01/09/2019 0.8  0.4 - 1.0 g/dL Final    Beta Globulin 01/09/2019 0.9  0.7 - 1.3 g/dL Final    Gamma Globulin 01/09/2019 0.6  0.4 - 1.8 g/dL Final    Albumin/Globulin Ratio 01/09/2019 1.4  0.7 - 1.7 Final    M-Ant, % 01/09/2019 Not Observed  Not Observed g/dL Final    Globulin, Total 01/09/2019 2.5  2.2 - 3.9 g/dL Final    Please Note: 01/09/2019 Comment   Final    Glucose 01/09/2019 100* 70 - 99 mg/dL Final    BUN, Bld 01/09/2019 25* 8 - 20 mg/dL Final    Creatinine 01/09/2019 2.45* 0.60 - 1.40 mg/dL Final    Calcium 01/09/2019 9.0  7.7 - 10.4 mg/dL Final    Sodium 01/09/2019 143  134 - 144 mmol/L Final    Potassium 01/09/2019 3.1* 3.5 - 5.0 mmol/L Final    Chloride 01/09/2019 115* 98 - 110 mmol/L Final    CO2 01/09/2019 20.1* 22.8 - 31.6 mmol/L Final    Albumin 01/09/2019 3.9  3.1 - 4.7 g/dL  Final    Total Bilirubin 01/09/2019 0.6  0.3 - 1.0 mg/dL Final    Alkaline Phosphatase 01/09/2019 142* 40 - 104 IU/L Final    Total Protein 01/09/2019 6.4  6.0 - 8.2 g/dL Final    ALT (SGPT) 01/09/2019 16  3 - 33 IU/L Final    AST 01/09/2019 20  10 - 40 IU/L Final   Orders Only on 12/20/2018   Component Date Value Ref Range Status    Microalbum.,U,Random 12/20/2018 87.1* 0.0 - 19.9 mcg/ml Final    Creatinine, Random Ur 12/20/2018 77.00  mg/dl Final    Microalb Creat Ratio 12/20/2018 113* 0 - 30 Final   Orders Only on 12/20/2018   Component Date Value Ref Range Status    Glucose 12/20/2018 93  70 - 99 mg/dL Final    BUN, Bld 12/20/2018 23* 8 - 20 mg/dL Final    Creatinine 12/20/2018 2.21* 0.60 - 1.40 mg/dL Final    Calcium 12/20/2018 8.8  7.7 - 10.4 mg/dL Final    Sodium 12/20/2018 142  134 - 144 mmol/L Final    Potassium 12/20/2018 3.3* 3.5 - 5.0 mmol/L Final    Chloride 12/20/2018 110  98 - 110 mmol/L Final    CO2 12/20/2018 23.9  22.8 - 31.6 mmol/L Final    Albumin 12/20/2018 3.8  3.1 - 4.7 g/dL Final    Total Bilirubin 12/20/2018 0.7  0.3 - 1.0 mg/dL Final    Alkaline Phosphatase 12/20/2018 146* 40 - 104 IU/L Final    Total Protein 12/20/2018 6.3  6.0 - 8.2 g/dL Final    ALT (SGPT) 12/20/2018 18  3 - 33 IU/L Final    AST 12/20/2018 21  10 - 40 IU/L Final    Hemoglobin A1C 12/20/2018 4.8  3.1 - 6.5 % Final       Past Medical History:   Diagnosis Date    Chronic kidney disease, stage III (moderate) 1/7/2018    Diabetes mellitus, type 2     Fatigue     Hypertension      Past Surgical History:   Procedure Laterality Date    ABDOMINAL SURGERY      BACK SURGERY      CERVICAL FUSION      x 4    cervix repair      CHOLECYSTECTOMY  2000    HYSTERECTOMY  1980    LUMBAR LAMINECTOMY      NECK SURGERY      SHOULDER SURGERY      vulva cancer       Family History   Problem Relation Age of Onset    Heart disease Mother     Hypertension Mother     Cancer Mother         lung    Heart  disease Father     Heart disease Sister         Open heart surgery    No Known Problems Daughter        Marital Status:   Alcohol History:  reports that she does not drink alcohol.  Tobacco History:  reports that she quit smoking about 18 years ago. Her smoking use included cigarettes. She has a 33.00 pack-year smoking history. She has never used smokeless tobacco.  Drug History:  reports that she does not use drugs.    Review of patient's allergies indicates:   Allergen Reactions    Ciprofloxacin Other (See Comments)     Elevated liver enzymes      Pcn [penicillins] Anaphylaxis       Current Outpatient Medications:     blood sugar diagnostic (BLOOD GLUCOSE TEST) Strp, 1 strip by Misc.(Non-Drug; Combo Route) route 3 (three) times daily., Disp: 300 strip, Rfl: 1    blood-glucose meter ([employer] BLOOD GLUCOSE SYSTEM) Misc, Check sugar three times daily, Disp: 1 each, Rfl: 0    calcitRIOL (ROCALTROL) 0.25 MCG Cap, Take 1 capsule (0.25 mcg total) by mouth once daily., Disp: 90 capsule, Rfl: 1    CARTIA  mg Cp24, TAKE 1 CAPSULE DAILY, Disp: 90 capsule, Rfl: 0    cyanocobalamin 1,000 mcg/mL injection, INJECT 1 ML IM MONTHLY, Disp: , Rfl: 4    cyclobenzaprine (FLEXERIL) 10 MG tablet, Take 1 tablet (10 mg total) by mouth 2 (two) times daily., Disp: 180 tablet, Rfl: 1    dicyclomine (BENTYL) 10 MG capsule, Take 1 capsule (10 mg total) by mouth 3 (three) times daily., Disp: 270 capsule, Rfl: 0    ergocalciferol (ERGOCALCIFEROL) 50,000 unit Cap, Take 1 capsule (50,000 Units total) by mouth every 7 days., Disp: 12 capsule, Rfl: 1    ferrous sulfate 325 mg (65 mg iron) CpSR, Take 1 tablet by mouth once daily., Disp: 90 capsule, Rfl: 1    gabapentin (NEURONTIN) 400 MG capsule, Take 2 capsules (800 mg total) by mouth every evening., Disp: 180 capsule, Rfl: 1    GATTEX 30-VIAL 5 mg Kit, , Disp: , Rfl:     lancets (EMBRACE LANCETS) 30 gauge Misc, by Misc.(Non-Drug; Combo Route) route 3 (three) times  daily., Disp: , Rfl:     levothyroxine (SYNTHROID) 88 MCG tablet, TAKE 1 TABLET DAILY, Disp: 90 tablet, Rfl: 1    magnesium oxide (MAG-OX) 400 mg tablet, Take 1 tablet (400 mg total) by mouth once daily. (Patient taking differently: Take 400 mg by mouth 2 (two) times daily. ), Disp: 90 tablet, Rfl: 1    promethazine (PHENERGAN) 25 MG tablet, Take 1 tablet (25 mg total) by mouth daily as needed for Nausea., Disp: 90 tablet, Rfl: 0    sodium bicarbonate 650 MG tablet, Take 650 mg by mouth 2 (two) times daily., Disp: , Rfl: 2    traMADol (ULTRAM) 50 mg tablet, Take 1 tablet (50 mg total) by mouth once daily., Disp: 42 tablet, Rfl: 0    rifAXIMin (XIFAXAN) 550 mg Tab, Take 1 tablet (550 mg total) by mouth 3 (three) times daily., Disp: 36 tablet, Rfl: 0    Review of Systems   Constitutional: Positive for fever. Negative for appetite change, chills, diaphoresis, fatigue and unexpected weight change (weight loss-not hungry after breakfast).   HENT: Negative for congestion, ear pain, hearing loss, nosebleeds, postnasal drip, sinus pressure, sinus pain, sneezing, sore throat, tinnitus, trouble swallowing and voice change.    Eyes: Negative for photophobia, pain, itching and visual disturbance.   Respiratory: Negative for apnea, cough, chest tightness, shortness of breath, wheezing and stridor.    Cardiovascular: Negative for chest pain, palpitations and leg swelling.   Gastrointestinal: Positive for diarrhea (severe watery diarrhea even post discharge). Negative for abdominal distention, abdominal pain, blood in stool, constipation, nausea and vomiting.        Never feels full-just no appetite at this time   Endocrine: Negative for cold intolerance, heat intolerance, polydipsia and polyuria.   Genitourinary: Negative for difficulty urinating, dyspareunia, dysuria, flank pain, frequency, hematuria, menstrual problem, pelvic pain, urgency, vaginal discharge and vaginal pain.   Musculoskeletal: Negative for arthralgias,  "back pain, joint swelling, myalgias, neck pain and neck stiffness.   Skin: Negative for pallor.   Allergic/Immunologic: Negative for environmental allergies and food allergies.   Neurological: Negative for dizziness (when she rises), tremors, speech difficulty, weakness, light-headedness and numbness.   Hematological: Does not bruise/bleed easily.   Psychiatric/Behavioral: Negative for agitation, confusion, decreased concentration, sleep disturbance (interrupted due to nocturia) and suicidal ideas. The patient is not nervous/anxious.           Objective:      Vitals:    04/08/19 1540   BP: 136/80   Pulse: 84   Resp: 14   Temp: 98.5 °F (36.9 °C)   SpO2: 96%   Weight: 76.7 kg (169 lb)   Height: 5' 6" (1.676 m)     Physical Exam   Constitutional: She is oriented to person, place, and time. She appears well-developed. She is cooperative. No distress.   Obvious weight loss   HENT:   Head: Normocephalic and atraumatic.   Right Ear: Tympanic membrane normal.   Left Ear: Tympanic membrane normal.   Nose: Nose normal.   Mouth/Throat: Uvula is midline and mucous membranes are normal.   Eyes: Pupils are equal, round, and reactive to light. Conjunctivae and EOM are normal. Right eye exhibits no discharge. Left eye exhibits no discharge. No scleral icterus. Right pupil is round and reactive. Left pupil is round and reactive.   Neck: Trachea normal and normal range of motion. Neck supple. No JVD present. Carotid bruit is not present. No thyromegaly present.   Cardiovascular: Normal rate, regular rhythm and intact distal pulses. Exam reveals no gallop and no friction rub.   No murmur heard.  Pulmonary/Chest: Effort normal and breath sounds normal. No respiratory distress. She has no wheezes. She has no rales.   Abdominal: Soft. She exhibits no distension and no mass. There is no tenderness. There is no guarding. No hernia.   Increased bowel sounds   Musculoskeletal: Normal range of motion. She exhibits no edema.   Neurological: " She is alert and oriented to person, place, and time. She has normal strength.   Skin: Skin is warm and dry. Capillary refill takes less than 2 seconds. No lesion and no rash noted. No cyanosis. Nails show no clubbing.   Pallor of inferior palpebral conjunctiva and generally   Psychiatric: She has a normal mood and affect. Her speech is normal and behavior is normal. Judgment and thought content normal.   Nursing note and vitals reviewed.        Assessment:       1. Hypokalemia    2. Vitamin D deficiency    3. Iron deficiency anemia due to chronic blood loss    4. Low magnesium level    5. Pure hypercholesterolemia    6. Arthralgia, unspecified joint    7. Diarrhea, unspecified type         Plan:       Hypokalemia  -     Basic metabolic panel; Future; Expected date: 04/08/2019    Vitamin D deficiency  -     ergocalciferol (ERGOCALCIFEROL) 50,000 unit Cap; Take 1 capsule (50,000 Units total) by mouth every 7 days.  Dispense: 12 capsule; Refill: 1    Iron deficiency anemia due to chronic blood loss  -     ferrous sulfate 325 mg (65 mg iron) CpSR; Take 1 tablet by mouth once daily.  Dispense: 90 capsule; Refill: 1  -     CBC auto differential; Future; Expected date: 04/08/2019    Low magnesium level  -     Magnesium; Future; Expected date: 04/08/2019    Pure hypercholesterolemia  -     Lipid panel; Future; Expected date: 04/08/2019    Arthralgia, unspecified joint  -     traMADol (ULTRAM) 50 mg tablet; Take 1 tablet (50 mg total) by mouth once daily.  Dispense: 42 tablet; Refill: 0    Diarrhea, unspecified type  -     Discontinue: rifAXIMin (XIFAXAN) 550 mg Tab; Take 1 tablet (550 mg total) by mouth 3 (three) times daily.  Dispense: 36 tablet; Refill: 0    Other orders  -     rifAXIMin (XIFAXAN) 550 mg Tab; Take 1 tablet (550 mg total) by mouth 3 (three) times daily.  Dispense: 36 tablet; Refill: 0      Follow up in about 3 months (around 7/8/2019).        4/8/2019 April Hortno M.D.

## 2019-04-09 LAB
BASOPHILS NFR BLD: 0 K/UL (ref 0–0.2)
BASOPHILS NFR BLD: 0.3 %
BUN SERPL-MCNC: 32 MG/DL (ref 8–20)
CALCIUM SERPL-MCNC: 8 MG/DL (ref 7.7–10.4)
CHLORIDE: 113 MMOL/L (ref 98–110)
CO2 SERPL-SCNC: 19.3 MMOL/L (ref 22.8–31.6)
CREATININE: 4.09 MG/DL (ref 0.6–1.4)
EOSINOPHIL NFR BLD: 0.2 K/UL (ref 0–0.7)
EOSINOPHIL NFR BLD: 3.5 %
ERYTHROCYTE [DISTWIDTH] IN BLOOD BY AUTOMATED COUNT: 15.1 % (ref 11.7–14.9)
GLUCOSE: 90 MG/DL (ref 70–99)
GRAN #: 3.5 K/UL (ref 1.4–6.5)
GRAN%: 55.6 %
HCT VFR BLD AUTO: 28.1 % (ref 36–48)
HGB BLD-MCNC: 9 G/DL (ref 12–15)
IMMATURE GRANS (ABS): 0 K/UL (ref 0–1)
IMMATURE GRANULOCYTES: 0.5 %
LYMPH #: 2.1 K/UL (ref 1.2–3.4)
LYMPH%: 33 %
MAGNESIUM SERPL-MCNC: 1.4 MG/DL (ref 1.5–2.6)
MCH RBC QN AUTO: 29.5 PG (ref 25–35)
MCHC RBC AUTO-ENTMCNC: 32 G/DL (ref 31–36)
MCV RBC AUTO: 92.1 FL (ref 79–98)
MONO #: 0.5 K/UL (ref 0.1–0.6)
MONO%: 7.1 %
NUCLEATED RBCS: 0 %
PLATELET # BLD AUTO: 194 K/UL (ref 140–440)
PMV BLD AUTO: 11.1 FL (ref 8.8–12.7)
POTASSIUM SERPL-SCNC: 3 MMOL/L (ref 3.5–5)
RBC # BLD AUTO: 3.05 M/UL (ref 3.5–5.5)
SODIUM: 140 MMOL/L (ref 134–144)
WBC # BLD AUTO: 6.3 K/UL (ref 5–10)

## 2019-04-10 ENCOUNTER — TELEPHONE (OUTPATIENT)
Dept: FAMILY MEDICINE | Facility: CLINIC | Age: 69
End: 2019-04-10

## 2019-04-10 DIAGNOSIS — E87.6 HYPOKALEMIA: Primary | ICD-10-CM

## 2019-04-10 RX ORDER — POTASSIUM CHLORIDE 20 MEQ/1
20 TABLET, EXTENDED RELEASE ORAL 2 TIMES DAILY
Qty: 180 TABLET | Refills: 0 | Status: SHIPPED | OUTPATIENT
Start: 2019-04-10 | End: 2019-06-28 | Stop reason: SDUPTHER

## 2019-04-10 NOTE — TELEPHONE ENCOUNTER
Pt called to be informed of K 3.0 and orders for Kdur 20 meq BID and recheck K in one week. LM on VM to call office back.

## 2019-04-10 NOTE — TELEPHONE ENCOUNTER
----- Message from April Horton MD sent at 4/10/2019  1:20 PM CDT -----  Please call the patient regarding her abnormal result.Hct one pint lower

## 2019-04-10 NOTE — TELEPHONE ENCOUNTER
Pt informed of HCT abnormal. Requested Pt let us know if she is on any supplements and how her protein intake is.

## 2019-04-12 ENCOUNTER — TELEPHONE (OUTPATIENT)
Dept: FAMILY MEDICINE | Facility: CLINIC | Age: 69
End: 2019-04-12

## 2019-04-12 NOTE — TELEPHONE ENCOUNTER
The following medication needs a prior authorization:     Medication Name: xifaxan    Dosage: 550 mg    Frequency: daily    Directions for use: TID    Diagnosis: diarrhea    Is the request for a reauthorization? No     Is the patient currently stable on therapy? New therapy    Please list all therapeutic alternatives previously used with start/end dates and outcome:      Taken OTC meds

## 2019-04-17 ENCOUNTER — TELEPHONE (OUTPATIENT)
Dept: FAMILY MEDICINE | Facility: CLINIC | Age: 69
End: 2019-04-17

## 2019-04-17 DIAGNOSIS — E87.6 HYPOKALEMIA: Primary | ICD-10-CM

## 2019-04-17 LAB — POTASSIUM SERPL-SCNC: 3.8 MMOL/L (ref 3.5–5)

## 2019-04-17 NOTE — TELEPHONE ENCOUNTER
Sent via portal  ----- Message from April Horton MD sent at 4/17/2019  2:45 PM CDT -----  Test results are normal-take potassium three times a day for two days then return to bid-check K+ in a month

## 2019-06-13 DIAGNOSIS — I10 ESSENTIAL HYPERTENSION, BENIGN: Chronic | ICD-10-CM

## 2019-06-15 RX ORDER — DILTIAZEM HYDROCHLORIDE 120 MG/1
CAPSULE, EXTENDED RELEASE ORAL
Qty: 90 CAPSULE | Refills: 0 | Status: SHIPPED | OUTPATIENT
Start: 2019-06-15 | End: 2019-10-12 | Stop reason: SDUPTHER

## 2019-06-29 RX ORDER — POTASSIUM CHLORIDE 20 MEQ/1
20 TABLET, EXTENDED RELEASE ORAL 2 TIMES DAILY
Qty: 180 TABLET | Refills: 1 | Status: SHIPPED | OUTPATIENT
Start: 2019-06-29 | End: 2019-07-01 | Stop reason: SDUPTHER

## 2019-07-01 RX ORDER — POTASSIUM CHLORIDE 20 MEQ/1
20 TABLET, EXTENDED RELEASE ORAL 2 TIMES DAILY
Qty: 180 TABLET | Refills: 1 | Status: SHIPPED | OUTPATIENT
Start: 2019-07-01 | End: 2019-07-10

## 2019-07-05 DIAGNOSIS — Z76.82 ORGAN TRANSPLANT CANDIDATE: Primary | ICD-10-CM

## 2019-07-09 ENCOUNTER — TELEPHONE (OUTPATIENT)
Dept: TRANSPLANT | Facility: CLINIC | Age: 69
End: 2019-07-09

## 2019-07-10 ENCOUNTER — OFFICE VISIT (OUTPATIENT)
Dept: FAMILY MEDICINE | Facility: CLINIC | Age: 69
End: 2019-07-10
Payer: MEDICARE

## 2019-07-10 VITALS
SYSTOLIC BLOOD PRESSURE: 124 MMHG | RESPIRATION RATE: 12 BRPM | TEMPERATURE: 98 F | OXYGEN SATURATION: 96 % | HEIGHT: 66 IN | WEIGHT: 158 LBS | HEART RATE: 64 BPM | DIASTOLIC BLOOD PRESSURE: 72 MMHG | BODY MASS INDEX: 25.39 KG/M2

## 2019-07-10 DIAGNOSIS — R79.0 LOW MAGNESIUM LEVEL: ICD-10-CM

## 2019-07-10 DIAGNOSIS — N18.5 CHRONIC KIDNEY DISEASE (CKD), STAGE V: Primary | ICD-10-CM

## 2019-07-10 DIAGNOSIS — Z86.2 HISTORY OF ANEMIA DUE TO CHRONIC KIDNEY DISEASE: ICD-10-CM

## 2019-07-10 DIAGNOSIS — M25.50 ARTHRALGIA, UNSPECIFIED JOINT: ICD-10-CM

## 2019-07-10 DIAGNOSIS — R30.0 DYSURIA: ICD-10-CM

## 2019-07-10 DIAGNOSIS — D50.0 IRON DEFICIENCY ANEMIA DUE TO CHRONIC BLOOD LOSS: Chronic | ICD-10-CM

## 2019-07-10 DIAGNOSIS — E87.6 HYPOKALEMIA: ICD-10-CM

## 2019-07-10 DIAGNOSIS — R63.4 WEIGHT LOSS: ICD-10-CM

## 2019-07-10 DIAGNOSIS — E11.42 DIABETIC POLYNEUROPATHY ASSOCIATED WITH TYPE 2 DIABETES MELLITUS: Chronic | ICD-10-CM

## 2019-07-10 DIAGNOSIS — N18.9 HISTORY OF ANEMIA DUE TO CHRONIC KIDNEY DISEASE: ICD-10-CM

## 2019-07-10 PROCEDURE — 3044F PR MOST RECENT HEMOGLOBIN A1C LEVEL <7.0%: ICD-10-PCS | Mod: ,,, | Performed by: INTERNAL MEDICINE

## 2019-07-10 PROCEDURE — 1101F PT FALLS ASSESS-DOCD LE1/YR: CPT | Mod: ,,, | Performed by: INTERNAL MEDICINE

## 2019-07-10 PROCEDURE — 99215 OFFICE O/P EST HI 40 MIN: CPT | Mod: ,,, | Performed by: INTERNAL MEDICINE

## 2019-07-10 PROCEDURE — 3074F PR MOST RECENT SYSTOLIC BLOOD PRESSURE < 130 MM HG: ICD-10-PCS | Mod: ,,, | Performed by: INTERNAL MEDICINE

## 2019-07-10 PROCEDURE — 1101F PR PT FALLS ASSESS DOC 0-1 FALLS W/OUT INJ PAST YR: ICD-10-PCS | Mod: ,,, | Performed by: INTERNAL MEDICINE

## 2019-07-10 PROCEDURE — 3074F SYST BP LT 130 MM HG: CPT | Mod: ,,, | Performed by: INTERNAL MEDICINE

## 2019-07-10 PROCEDURE — 3078F PR MOST RECENT DIASTOLIC BLOOD PRESSURE < 80 MM HG: ICD-10-PCS | Mod: ,,, | Performed by: INTERNAL MEDICINE

## 2019-07-10 PROCEDURE — 3044F HG A1C LEVEL LT 7.0%: CPT | Mod: ,,, | Performed by: INTERNAL MEDICINE

## 2019-07-10 PROCEDURE — 99215 PR OFFICE/OUTPT VISIT, EST, LEVL V, 40-54 MIN: ICD-10-PCS | Mod: ,,, | Performed by: INTERNAL MEDICINE

## 2019-07-10 PROCEDURE — 3078F DIAST BP <80 MM HG: CPT | Mod: ,,, | Performed by: INTERNAL MEDICINE

## 2019-07-10 RX ORDER — NITROFURANTOIN MACROCRYSTALS 50 MG/1
50 CAPSULE ORAL 2 TIMES DAILY
Qty: 20 CAPSULE | Refills: 0 | Status: SHIPPED | OUTPATIENT
Start: 2019-07-10 | End: 2019-08-05

## 2019-07-10 RX ORDER — TRAMADOL HYDROCHLORIDE 50 MG/1
50 TABLET ORAL DAILY
Qty: 42 TABLET | Refills: 0 | Status: SHIPPED | OUTPATIENT
Start: 2019-07-10 | End: 2019-08-27 | Stop reason: SDUPTHER

## 2019-07-10 RX ORDER — POTASSIUM CHLORIDE 750 MG/1
10 TABLET, EXTENDED RELEASE ORAL 2 TIMES DAILY
Qty: 180 TABLET | Refills: 2 | Status: CANCELLED | OUTPATIENT
Start: 2019-07-10

## 2019-07-10 RX ORDER — OXYCODONE AND ACETAMINOPHEN 5; 325 MG/1; MG/1
TABLET ORAL
Refills: 0 | COMMUNITY
Start: 2019-06-05 | End: 2019-07-10

## 2019-07-10 NOTE — PROGRESS NOTES
SUBJECTIVE:    Patient ID: Shara Hutchins is a 69 y.o. female.    Chief Complaint: Anemia; Hypokalemia; and Follow-up    HPI     Patient comes in for follow-up of anemia and hypokalemia. She has a history of chronic renal insufficiency and short gut syndrome-D chronic renal disease is aggravated by bilateral nonobstructive renal stones. She has a history of multiple transfusions during hospitalizations, secondary to GI bleeding of undetermined etiology. She also has a history of chronic diarrhea which has responded to Xifaxan in the past. She is followed by several providers and it makes laboratory testing somewhat awkward. But last test joint by me indicated a magnesium of 1.4, BU and 32, creatinine 4.09. Potassium had been 3 but when supplemented potassium was 3.8 on 4179. Hemoglobin was 9 creatinine was 28.1 and did reflect reduction from prior Hct of about what amounted to a pint of blood.Last Hbg 8.4--she is not able to take the regular Iron tablet-we ordered it specifically. Since last OV she has had two sets transfusions of two units each time-on procrit now and is on way to get the procrit today.K+ now 2.8.    The information from renal notes is consideration for renal transplant but no one has discussed with HER.    As expected she feels tired-she is losing weight, no appetite-nothing tastes good-she is not taking dialysis yet.Record reflects tests ordered 7-5 about which pt is uninformed.-but nurse found, as expected, they were ordered by the transplant prep MD    Office Visit on 07/10/2019   Component Date Value Ref Range Status    Glucose 07/11/2019 87  70 - 99 mg/dL Final    BUN, Bld 07/11/2019 25* 8 - 20 mg/dL Final    Creatinine 07/11/2019 3.58* 0.60 - 1.40 mg/dL Final    Calcium 07/11/2019 8.9  7.7 - 10.4 mg/dL Final    Sodium 07/11/2019 145* 134 - 144 mmol/L Final    Potassium 07/11/2019 2.8* 3.5 - 5.0 mmol/L Final    Chloride 07/11/2019 115* 98 - 110 mmol/L Final    CO2 07/11/2019 19.9*  22.8 - 31.6 mmol/L Final    Albumin 07/11/2019 4.0  3.1 - 4.7 g/dL Final    Total Bilirubin 07/11/2019 0.5  0.3 - 1.0 mg/dL Final    Alkaline Phosphatase 07/11/2019 166* 40 - 104 IU/L Final    Total Protein 07/11/2019 6.5  6.0 - 8.2 g/dL Final    ALT (SGPT) 07/11/2019 21  3 - 33 IU/L Final    AST 07/11/2019 32  10 - 40 IU/L Final    Magnesium 07/11/2019 1.8  1.5 - 2.6 mg/dL Final    Hemoglobin A1C 07/11/2019 5.1  3.1 - 6.5 % Final   Telephone on 04/10/2019   Component Date Value Ref Range Status    Potassium 04/17/2019 3.8  3.5 - 5.0 mmol/L Final   Office Visit on 04/08/2019   Component Date Value Ref Range Status    Magnesium 04/09/2019 1.4* 1.5 - 2.6 mg/dL Final    Glucose 04/09/2019 90  70 - 99 mg/dL Final    BUN, Bld 04/09/2019 32* 8 - 20 mg/dL Final    Creatinine 04/09/2019 4.09* 0.60 - 1.40 mg/dL Final    Calcium 04/09/2019 8.0  7.7 - 10.4 mg/dL Final    Sodium 04/09/2019 140  134 - 144 mmol/L Final    Potassium 04/09/2019 3.0* 3.5 - 5.0 mmol/L Final    Chloride 04/09/2019 113* 98 - 110 mmol/L Final    CO2 04/09/2019 19.3* 22.8 - 31.6 mmol/L Final    WBC 04/09/2019 6.3  5.0 - 10.0 K/uL Final    RBC 04/09/2019 3.05* 3.50 - 5.50 M/uL Final    Hemoglobin 04/09/2019 9.0* 12.0 - 15.0 g/dL Final    Hematocrit 04/09/2019 28.1* 36.0 - 48.0 % Final    Mean Corpuscular Volume 04/09/2019 92.1  79.0 - 98.0 fL Final    Mean Corpuscular Hemoglobin 04/09/2019 29.5  25.0 - 35.0 pg Final    Mean Corpuscular Hemoglobin Conc 04/09/2019 32.0  31.0 - 36.0 g/dL Final    RDW 04/09/2019 15.1* 11.7 - 14.9 % Final    Platelets 04/09/2019 194  140 - 440 K/uL Final    MPV 04/09/2019 11.1  8.8 - 12.7 fL Final    Gran% 04/09/2019 55.6  % Final    Lymph% 04/09/2019 33.0  % Final    Mono% 04/09/2019 7.1  % Final    Eosinophil% 04/09/2019 3.5  % Final    Basophil% 04/09/2019 0.3  % Final    Gran # (ANC) 04/09/2019 3.5  1.4 - 6.5 K/uL Final    Lymph # 04/09/2019 2.1  1.2 - 3.4 K/uL Final    Mono # 04/09/2019  0.5  0.1 - 0.6 K/uL Final    Eos # 04/09/2019 0.2  0.0 - 0.7 K/uL Final    Baso # 04/09/2019 0.0  0.0 - 0.2 K/uL Final    Immature Grans (Abs) 04/09/2019 0.0  0.0 - 1.0 K/uL Final    Immature Granulocytes 04/09/2019 0.5  % Final    nRBC% 04/09/2019 0  % Final       Past Medical History:   Diagnosis Date    Chronic kidney disease, stage III (moderate) 1/7/2018    Diabetes mellitus, type 2     Fatigue     Hypertension     Vulvar cancer 1/7/2019     Past Surgical History:   Procedure Laterality Date    ABDOMINAL SURGERY      BACK SURGERY      CERVICAL FUSION      x 4    cervix repair      CHOLECYSTECTOMY  2000    HYSTERECTOMY  1980    LUMBAR LAMINECTOMY      NECK SURGERY      SHOULDER SURGERY      vulva cancer       Family History   Problem Relation Age of Onset    Heart disease Mother     Hypertension Mother     Cancer Mother         lung    Heart disease Father     Heart disease Sister         Open heart surgery    No Known Problems Daughter        Marital Status:   Alcohol History:  reports that she does not drink alcohol.  Tobacco History:  reports that she quit smoking about 18 years ago. Her smoking use included cigarettes. She has a 33.00 pack-year smoking history. She has never used smokeless tobacco.  Drug History:  reports that she does not use drugs.    Review of patient's allergies indicates:   Allergen Reactions    Ciprofloxacin Other (See Comments)     Elevated liver enzymes      Pcn [penicillins] Anaphylaxis       Current Outpatient Medications:     blood sugar diagnostic (BLOOD GLUCOSE TEST) Strp, 1 strip by Misc.(Non-Drug; Combo Route) route 3 (three) times daily., Disp: 300 strip, Rfl: 1    blood-glucose meter (EMBRACE BLOOD GLUCOSE SYSTEM) Misc, Check sugar three times daily, Disp: 1 each, Rfl: 0    calcitRIOL (ROCALTROL) 0.25 MCG Cap, Take 1 capsule (0.25 mcg total) by mouth once daily., Disp: 90 capsule, Rfl: 1    CARTIA  mg Cp24, TAKE 1 CAPSULE DAILY,  Disp: 90 capsule, Rfl: 0    cyclobenzaprine (FLEXERIL) 10 MG tablet, Take 1 tablet (10 mg total) by mouth 2 (two) times daily., Disp: 180 tablet, Rfl: 1    dicyclomine (BENTYL) 10 MG capsule, Take 1 capsule (10 mg total) by mouth 3 (three) times daily., Disp: 270 capsule, Rfl: 0    ferrous sulfate 325 mg (65 mg iron) CpSR, Take 1 tablet by mouth once daily., Disp: 90 capsule, Rfl: 1    gabapentin (NEURONTIN) 400 MG capsule, Take 2 capsules (800 mg total) by mouth every evening., Disp: 180 capsule, Rfl: 1    lancets (EMBRACE LANCETS) 30 gauge Misc, by Misc.(Non-Drug; Combo Route) route 3 (three) times daily., Disp: , Rfl:     levothyroxine (SYNTHROID) 88 MCG tablet, TAKE 1 TABLET DAILY, Disp: 90 tablet, Rfl: 1    magnesium oxide (MAG-OX) 400 mg tablet, Take 1 tablet (400 mg total) by mouth once daily. (Patient taking differently: Take 400 mg by mouth 2 (two) times daily. ), Disp: 90 tablet, Rfl: 1    POTASSIUM CHLORIDE ORAL, Take 10 mEq by mouth 2 (two) times daily. ER WAX TABS, Disp: , Rfl:     promethazine (PHENERGAN) 25 MG tablet, Take 1 tablet (25 mg total) by mouth daily as needed for Nausea., Disp: 90 tablet, Rfl: 0    sodium bicarbonate 650 MG tablet, Take 650 mg by mouth 3 (three) times daily. , Disp: , Rfl: 2    nitrofurantoin (MACRODANTIN) 50 MG capsule, Take 1 capsule (50 mg total) by mouth 2 (two) times daily., Disp: 20 capsule, Rfl: 0    potassium chloride SA (K-DUR,KLOR-CON) 20 MEQ tablet, Take 20 MEQ by mouth 2 times daily.ER Wax Tablets needed for easier swallowing.Closest substitute ok, Disp: 100 tablet, Rfl: 3    traMADol (ULTRAM) 50 mg tablet, Take 1 tablet (50 mg total) by mouth once daily., Disp: 42 tablet, Rfl: 0    Review of Systems   Constitutional: Negative for appetite change, chills, diaphoresis, fatigue, fever and unexpected weight change.   HENT: Negative for congestion, ear pain, hearing loss, nosebleeds, postnasal drip, sinus pressure, sinus pain, sneezing, sore throat,  "tinnitus, trouble swallowing and voice change.    Eyes: Negative for photophobia, pain, itching and visual disturbance.   Respiratory: Negative for apnea, cough, chest tightness, shortness of breath, wheezing and stridor.    Cardiovascular: Negative for chest pain, palpitations and leg swelling.   Gastrointestinal: Positive for diarrhea (worst). Negative for abdominal distention, abdominal pain, blood in stool, constipation, nausea (frequent) and vomiting.   Endocrine: Negative for cold intolerance, heat intolerance, polydipsia and polyuria.   Genitourinary: Positive for frequency (asociated with burning). Negative for difficulty urinating, dyspareunia, dysuria, flank pain, hematuria, menstrual problem, pelvic pain, urgency, vaginal discharge and vaginal pain.   Musculoskeletal: Negative for arthralgias, back pain (lower), joint swelling, myalgias, neck pain (chronic) and neck stiffness.        Shunt site left upper arm   Skin: Negative for pallor.        No pruritis-   Allergic/Immunologic: Negative for environmental allergies and food allergies.   Neurological: Negative for dizziness, tremors, speech difficulty, weakness, light-headedness and numbness.   Hematological: Does not bruise/bleed easily.   Psychiatric/Behavioral: Negative for agitation, confusion, decreased concentration, sleep disturbance and suicidal ideas. The patient is not nervous/anxious.           Objective:      Vitals:    07/10/19 0853   BP: 124/72   Pulse: 64   Resp: 12   Temp: 98.2 °F (36.8 °C)   SpO2: 96%   Weight: 71.7 kg (158 lb)   Height: 5' 6" (1.676 m)     Physical Exam   Constitutional: She is oriented to person, place, and time. She appears well-developed and well-nourished. She is cooperative. No distress.   Thin and pale female no acute distress   HENT:   Head: Normocephalic and atraumatic.   Right Ear: Tympanic membrane normal.   Left Ear: Tympanic membrane normal.   Mouth/Throat: Uvula is midline and mucous membranes are normal. "   Eyes: Pupils are equal, round, and reactive to light. Conjunctivae and EOM are normal. Right eye exhibits no discharge. Left eye exhibits no discharge. No scleral icterus. Right pupil is round and reactive. Left pupil is round and reactive.   Neck: Trachea normal and normal range of motion. Neck supple. No JVD present. Carotid bruit is not present. No thyromegaly present.   Cardiovascular: Normal rate, regular rhythm and intact distal pulses. Exam reveals no gallop and no friction rub.   No murmur heard.  Pulmonary/Chest: Effort normal and breath sounds normal. No respiratory distress. She has no wheezes. She has no rales.   Abdominal: Soft. Bowel sounds are normal. She exhibits no distension and no mass. There is no tenderness. There is no guarding. No hernia.   Musculoskeletal: Normal range of motion. She exhibits no edema.   Area of maturing shunt inner left upper shunt that is warm and erythematous but without signs of lymphangitis-there is some mild swelling of the site and a more proximal area of swelling in a suspected site of procedure-tender over the sight-no absolute signs of cellulitis   Neurological: She is alert and oriented to person, place, and time. She has normal strength.   Skin: Skin is warm and dry. Capillary refill takes less than 2 seconds. No lesion and no rash noted. No cyanosis. Nails show no clubbing.   Psychiatric: She has a normal mood and affect. Her speech is normal and behavior is normal. Judgment and thought content normal.   Nursing note and vitals reviewed.        Assessment:       1. Chronic kidney disease (CKD), stage V    2. History of anemia due to chronic kidney disease    3. Iron deficiency anemia due to chronic blood loss    4. Hypokalemia    5. Dysuria    6. Low magnesium level    7. Arthralgia, unspecified joint    8. Weight loss    9. Diabetic polyneuropathy associated with type 2 diabetes mellitus         Plan:       Chronic kidney disease (CKD), stage V         -      Follow labs with Renal    History of anemia due to chronic kidney disease        -     See below               Iron deficiency anemia due to chronic blood loss  -     ferrous sulfate 325 mg (65 mg iron) CpSR; Take 1 tablet by mouth once daily.  Dispense: 90 capsule; Refill: 1    Dysuria  -     Urinalysis, Complete with Reflex To Culture  -     nitrofurantoin (MACRODANTIN) 50 MG capsule; Take 1 capsule (50 mg total) by mouth 2 (two) times daily.  Dispense: 20 capsule; Refill: 0    Hypokalemia  -     Comprehensive metabolic panel; Future; Expected date: 07/11/2019  -     Potassium KDur 20 meq 20 meq qid for a week the bid and recheck potassium one week    Low magnesium level  -     Magnesium; Future; Expected date: 07/10/2019    Arthralgia, unspecified joint  -     traMADol (ULTRAM) 50 mg tablet; Take 1 tablet (50 mg total) by mouth once daily.  Dispense: 42 tablet; Refill: 0    Weight loss    Diabetic polyneuropathy associated with type 2 diabetes mellitus  -     Hemoglobin A1C; Future; Expected date: 07/10/2019    Other orders  -     UNKNOWN [facility]  -     Estimated Glomerular Filtration Rate  -     Hemoglobin A1c      No follow-ups on file.        7/13/2019 April Horton M.D.

## 2019-07-11 LAB
ALBUMIN SERPL-MCNC: 4 G/DL (ref 3.1–4.7)
ALP SERPL-CCNC: 166 IU/L (ref 40–104)
ALT (SGPT): 21 IU/L (ref 3–33)
AST SERPL-CCNC: 32 IU/L (ref 10–40)
BILIRUB SERPL-MCNC: 0.5 MG/DL (ref 0.3–1)
BUN SERPL-MCNC: 25 MG/DL (ref 8–20)
CALCIUM SERPL-MCNC: 8.9 MG/DL (ref 7.7–10.4)
CHLORIDE: 115 MMOL/L (ref 98–110)
CO2 SERPL-SCNC: 19.9 MMOL/L (ref 22.8–31.6)
CREATININE: 3.58 MG/DL (ref 0.6–1.4)
GLUCOSE: 87 MG/DL (ref 70–99)
HBA1C MFR BLD: 5.1 % (ref 3.1–6.5)
MAGNESIUM SERPL-MCNC: 1.8 MG/DL (ref 1.5–2.6)
POTASSIUM SERPL-SCNC: 2.8 MMOL/L (ref 3.5–5)
PROT SERPL-MCNC: 6.5 G/DL (ref 6–8.2)
SODIUM: 145 MMOL/L (ref 134–144)

## 2019-07-11 RX ORDER — POTASSIUM CHLORIDE 750 MG/1
CAPSULE, EXTENDED RELEASE ORAL
Qty: 90 CAPSULE | Refills: 1 | Status: CANCELLED | OUTPATIENT
Start: 2019-07-11

## 2019-07-11 RX ORDER — POTASSIUM CHLORIDE 20 MEQ/1
TABLET, EXTENDED RELEASE ORAL
Qty: 100 TABLET | Refills: 3 | Status: SHIPPED | OUTPATIENT
Start: 2019-07-11 | End: 2019-10-15

## 2019-07-13 PROBLEM — N18.5 STAGE 5 CHRONIC KIDNEY DISEASE NOT ON CHRONIC DIALYSIS: Status: ACTIVE | Noted: 2018-11-04

## 2019-07-13 PROBLEM — C51.9 VULVAR CANCER: Status: RESOLVED | Noted: 2019-01-07 | Resolved: 2019-07-13

## 2019-07-16 ENCOUNTER — TELEPHONE (OUTPATIENT)
Dept: FAMILY MEDICINE | Facility: CLINIC | Age: 69
End: 2019-07-16

## 2019-07-16 NOTE — TELEPHONE ENCOUNTER
[facility] is to fill the potassium ER wax tablets per anival Busch,  as follows as they are smaller for the pt ease of swallowing:    Potassium CHL ER wax tablets 10MEQ: take 2 tablets by mouth 2 times daily. [id number], Refills 3.

## 2019-07-27 ENCOUNTER — OUTSIDE PLACE OF SERVICE (OUTPATIENT)
Dept: FAMILY MEDICINE | Facility: CLINIC | Age: 69
End: 2019-07-27
Payer: MEDICARE

## 2019-07-27 DIAGNOSIS — T82.7XXA INFECTION AND INFLAMMATORY REACTION DUE TO CARDIAC DEVICE, IMPLANT, AND GRAFT: ICD-10-CM

## 2019-07-27 PROCEDURE — G0180 PR HOME HEALTH MD CERTIFICATION: ICD-10-PCS | Mod: ,,, | Performed by: INTERNAL MEDICINE

## 2019-07-27 PROCEDURE — G0180 MD CERTIFICATION HHA PATIENT: HCPCS | Mod: ,,, | Performed by: INTERNAL MEDICINE

## 2019-07-30 ENCOUNTER — LAB VISIT (OUTPATIENT)
Dept: LAB | Facility: HOSPITAL | Age: 69
End: 2019-07-30
Attending: INTERNAL MEDICINE
Payer: MEDICARE

## 2019-07-30 DIAGNOSIS — I12.9 HYPERTENSIVE KIDNEY DISEASE: ICD-10-CM

## 2019-07-30 DIAGNOSIS — R94.4 ABNORMAL RESULTS OF KIDNEY FUNCTION STUDIES: Primary | ICD-10-CM

## 2019-07-30 DIAGNOSIS — E03.9 JUVENILE MYXEDEMA: ICD-10-CM

## 2019-07-30 DIAGNOSIS — E11.22 TYPE 2 DIABETES MELLITUS WITH CHRONIC KIDNEY DISEASE: ICD-10-CM

## 2019-07-30 DIAGNOSIS — N18.30 CHRONIC RENAL DISEASE, STAGE III: ICD-10-CM

## 2019-07-30 DIAGNOSIS — Z76.82 ORGAN TRANSPLANT CANDIDATE: ICD-10-CM

## 2019-07-30 DIAGNOSIS — E87.20 ACIDOSIS: ICD-10-CM

## 2019-07-30 LAB
ALBUMIN SERPL BCP-MCNC: 3.4 G/DL (ref 3.5–5.2)
ALP SERPL-CCNC: 115 U/L (ref 55–135)
ALT SERPL W/O P-5'-P-CCNC: 15 U/L (ref 10–44)
ANION GAP SERPL CALC-SCNC: 7 MMOL/L (ref 8–16)
AST SERPL-CCNC: 29 U/L (ref 10–40)
BASOPHILS # BLD AUTO: 0.05 K/UL (ref 0–0.2)
BASOPHILS NFR BLD: 0.4 % (ref 0–1.9)
BILIRUB SERPL-MCNC: 0.4 MG/DL (ref 0.1–1)
BUN SERPL-MCNC: 28 MG/DL (ref 8–23)
CALCIUM SERPL-MCNC: 9.4 MG/DL (ref 8.7–10.5)
CHLORIDE SERPL-SCNC: 110 MMOL/L (ref 95–110)
CO2 SERPL-SCNC: 24 MMOL/L (ref 23–29)
CREAT SERPL-MCNC: 2.5 MG/DL (ref 0.5–1.4)
CREAT UR-MCNC: 135 MG/DL (ref 15–325)
DIFFERENTIAL METHOD: ABNORMAL
EOSINOPHIL # BLD AUTO: 0.2 K/UL (ref 0–0.5)
EOSINOPHIL NFR BLD: 1.8 % (ref 0–8)
ERYTHROCYTE [DISTWIDTH] IN BLOOD BY AUTOMATED COUNT: 14.7 % (ref 11.5–14.5)
EST. GFR  (AFRICAN AMERICAN): 21.9 ML/MIN/1.73 M^2
EST. GFR  (NON AFRICAN AMERICAN): 19 ML/MIN/1.73 M^2
FERRITIN SERPL-MCNC: 877 NG/ML (ref 20–300)
GLUCOSE SERPL-MCNC: 85 MG/DL (ref 70–110)
HCT VFR BLD AUTO: 29.4 % (ref 37–48.5)
HGB BLD-MCNC: 9 G/DL (ref 12–16)
IMM GRANULOCYTES # BLD AUTO: 0.15 K/UL (ref 0–0.04)
IMM GRANULOCYTES NFR BLD AUTO: 1.3 % (ref 0–0.5)
IRON SERPL-MCNC: 86 UG/DL (ref 30–160)
LYMPHOCYTES # BLD AUTO: 2.2 K/UL (ref 1–4.8)
LYMPHOCYTES NFR BLD: 19.4 % (ref 18–48)
MCH RBC QN AUTO: 32 PG (ref 27–31)
MCHC RBC AUTO-ENTMCNC: 30.6 G/DL (ref 32–36)
MCV RBC AUTO: 105 FL (ref 82–98)
MONOCYTES # BLD AUTO: 0.6 K/UL (ref 0.3–1)
MONOCYTES NFR BLD: 5.5 % (ref 4–15)
NEUTROPHILS # BLD AUTO: 8 K/UL (ref 1.8–7.7)
NEUTROPHILS NFR BLD: 71.6 % (ref 38–73)
NRBC BLD-RTO: 0 /100 WBC
PHOSPHATE SERPL-MCNC: 4.2 MG/DL (ref 2.7–4.5)
PLATELET # BLD AUTO: 395 K/UL (ref 150–350)
PMV BLD AUTO: 10.5 FL (ref 9.2–12.9)
POTASSIUM SERPL-SCNC: 4.9 MMOL/L (ref 3.5–5.1)
PROT SERPL-MCNC: 6.5 G/DL (ref 6–8.4)
PROT UR-MCNC: 57 MG/DL (ref 0–15)
RBC # BLD AUTO: 2.81 M/UL (ref 4–5.4)
SATURATED IRON: 35 % (ref 20–50)
SODIUM SERPL-SCNC: 141 MMOL/L (ref 136–145)
TOTAL IRON BINDING CAPACITY: 248 UG/DL (ref 250–450)
TRANSFERRIN SERPL-MCNC: 177 MG/DL (ref 200–375)
WBC # BLD AUTO: 11.18 K/UL (ref 3.9–12.7)

## 2019-07-30 PROCEDURE — 84156 ASSAY OF PROTEIN URINE: CPT

## 2019-07-30 PROCEDURE — 82570 ASSAY OF URINE CREATININE: CPT

## 2019-07-30 PROCEDURE — 85025 COMPLETE CBC W/AUTO DIFF WBC: CPT

## 2019-07-30 PROCEDURE — 82728 ASSAY OF FERRITIN: CPT

## 2019-07-30 PROCEDURE — 80053 COMPREHEN METABOLIC PANEL: CPT

## 2019-07-30 PROCEDURE — 84100 ASSAY OF PHOSPHORUS: CPT

## 2019-07-30 PROCEDURE — 83540 ASSAY OF IRON: CPT

## 2019-08-01 DIAGNOSIS — Z76.82 ORGAN TRANSPLANT CANDIDATE: Primary | ICD-10-CM

## 2019-08-02 RX ORDER — DOXYCYCLINE 100 MG/1
CAPSULE ORAL
Refills: 0 | COMMUNITY
Start: 2019-07-26 | End: 2019-08-26

## 2019-08-05 ENCOUNTER — OFFICE VISIT (OUTPATIENT)
Dept: INFECTIOUS DISEASES | Facility: CLINIC | Age: 69
End: 2019-08-05
Payer: MEDICARE

## 2019-08-05 ENCOUNTER — LAB VISIT (OUTPATIENT)
Dept: LAB | Facility: HOSPITAL | Age: 69
End: 2019-08-05
Attending: INTERNAL MEDICINE
Payer: MEDICARE

## 2019-08-05 VITALS
WEIGHT: 150 LBS | OXYGEN SATURATION: 98 % | TEMPERATURE: 98 F | DIASTOLIC BLOOD PRESSURE: 72 MMHG | SYSTOLIC BLOOD PRESSURE: 120 MMHG | HEIGHT: 66 IN | BODY MASS INDEX: 24.11 KG/M2 | HEART RATE: 77 BPM

## 2019-08-05 DIAGNOSIS — K52.9 CHRONIC DIARRHEA: ICD-10-CM

## 2019-08-05 DIAGNOSIS — A49.02 MRSA (METHICILLIN RESISTANT STAPHYLOCOCCUS AUREUS): ICD-10-CM

## 2019-08-05 DIAGNOSIS — T81.49XA WOUND INFECTION AFTER SURGERY: Primary | ICD-10-CM

## 2019-08-05 DIAGNOSIS — Z01.818 PRE-TRANSPLANT EVALUATION FOR CHRONIC KIDNEY DISEASE: ICD-10-CM

## 2019-08-05 DIAGNOSIS — R63.4 WEIGHT LOSS: ICD-10-CM

## 2019-08-05 DIAGNOSIS — N18.9 ANEMIA OF CHRONIC RENAL FAILURE: Primary | ICD-10-CM

## 2019-08-05 DIAGNOSIS — D63.1 ANEMIA OF CHRONIC RENAL FAILURE: Primary | ICD-10-CM

## 2019-08-05 LAB
BASOPHILS # BLD AUTO: 0.06 K/UL (ref 0–0.2)
BASOPHILS NFR BLD: 1.1 % (ref 0–1.9)
DIFFERENTIAL METHOD: ABNORMAL
EOSINOPHIL # BLD AUTO: 0.1 K/UL (ref 0–0.5)
EOSINOPHIL NFR BLD: 1.8 % (ref 0–8)
ERYTHROCYTE [DISTWIDTH] IN BLOOD BY AUTOMATED COUNT: 14.2 % (ref 11.5–14.5)
HCT VFR BLD AUTO: 28.6 % (ref 37–48.5)
HGB BLD-MCNC: 8.7 G/DL (ref 12–16)
IMM GRANULOCYTES # BLD AUTO: 0.03 K/UL (ref 0–0.04)
IMM GRANULOCYTES NFR BLD AUTO: 0.5 % (ref 0–0.5)
LYMPHOCYTES # BLD AUTO: 1.3 K/UL (ref 1–4.8)
LYMPHOCYTES NFR BLD: 24.2 % (ref 18–48)
MCH RBC QN AUTO: 31.5 PG (ref 27–31)
MCHC RBC AUTO-ENTMCNC: 30.4 G/DL (ref 32–36)
MCV RBC AUTO: 104 FL (ref 82–98)
MONOCYTES # BLD AUTO: 0.4 K/UL (ref 0.3–1)
MONOCYTES NFR BLD: 6.7 % (ref 4–15)
NEUTROPHILS # BLD AUTO: 3.6 K/UL (ref 1.8–7.7)
NEUTROPHILS NFR BLD: 65.7 % (ref 38–73)
NRBC BLD-RTO: 0 /100 WBC
PLATELET # BLD AUTO: 260 K/UL (ref 150–350)
PMV BLD AUTO: 9.7 FL (ref 9.2–12.9)
RBC # BLD AUTO: 2.76 M/UL (ref 4–5.4)
WBC # BLD AUTO: 5.54 K/UL (ref 3.9–12.7)

## 2019-08-05 PROCEDURE — 1101F PT FALLS ASSESS-DOCD LE1/YR: CPT | Mod: S$GLB,,, | Performed by: INTERNAL MEDICINE

## 2019-08-05 PROCEDURE — 3078F PR MOST RECENT DIASTOLIC BLOOD PRESSURE < 80 MM HG: ICD-10-PCS | Mod: S$GLB,,, | Performed by: INTERNAL MEDICINE

## 2019-08-05 PROCEDURE — 99213 OFFICE O/P EST LOW 20 MIN: CPT | Mod: S$GLB,,, | Performed by: INTERNAL MEDICINE

## 2019-08-05 PROCEDURE — 85025 COMPLETE CBC W/AUTO DIFF WBC: CPT

## 2019-08-05 PROCEDURE — 3078F DIAST BP <80 MM HG: CPT | Mod: S$GLB,,, | Performed by: INTERNAL MEDICINE

## 2019-08-05 PROCEDURE — 1101F PR PT FALLS ASSESS DOC 0-1 FALLS W/OUT INJ PAST YR: ICD-10-PCS | Mod: S$GLB,,, | Performed by: INTERNAL MEDICINE

## 2019-08-05 PROCEDURE — 3074F SYST BP LT 130 MM HG: CPT | Mod: S$GLB,,, | Performed by: INTERNAL MEDICINE

## 2019-08-05 PROCEDURE — 99213 PR OFFICE/OUTPT VISIT, EST, LEVL III, 20-29 MIN: ICD-10-PCS | Mod: S$GLB,,, | Performed by: INTERNAL MEDICINE

## 2019-08-05 PROCEDURE — 3074F PR MOST RECENT SYSTOLIC BLOOD PRESSURE < 130 MM HG: ICD-10-PCS | Mod: S$GLB,,, | Performed by: INTERNAL MEDICINE

## 2019-08-05 RX ORDER — FLUCONAZOLE 100 MG/1
TABLET ORAL
Qty: 10 TABLET | Refills: 1 | Status: SHIPPED | OUTPATIENT
Start: 2019-08-05 | End: 2019-08-05 | Stop reason: CLARIF

## 2019-08-05 RX ORDER — FLUCONAZOLE 100 MG/1
TABLET ORAL
Qty: 10 TABLET | Refills: 1 | Status: SHIPPED | OUTPATIENT
Start: 2019-08-05 | End: 2019-10-15

## 2019-08-05 NOTE — PATIENT INSTRUCTIONS
Finish the doxycycline that you have  Fluconazole(diflucan) 100 mg every other day as needed for yeast infection.    Please call me if you need me

## 2019-08-05 NOTE — PROGRESS NOTES
"Subjective:       Patient ID: Shara Hutchins is a 69 y.o. female.    Chief Complaint:: Hospital Follow Up    HPI  Seen at [facility] "69-year-old woman, Known to me from several prior consultations, was brought to the emergency room for altered mental status. In the emergency room she was confused, febrile to 103, and evidence of left upper extremity AV fistula wound infection. An ultrasound demonstrated large fluid collection, blood cultures were obtained and vancomycin and meropenem were begun. MRI and MRA were both performed to exclude acute central nervous system injury and these were negative. Serum CO2 was 15 but lactic acid was normal She was admitted to the intensive care unit  Imp:  Cellulitis left upper extremity overlying recent AV fistula, with "large" fluid collection surrounding the fistula  Altered mental status, likely secondary to sepsis  Chronic kidney disease 5 with metabolic acidosis  Chronic diarrhea secondary to short gut syndrome  History of vulvar cancer and recurring cellulitis, chronic perineal irritation secondary to diarrhea"    8/5/19:  The left upper extremity wound grew MRSA and she received vancomycin while hospitalized.  She had a JOHN drain in place which produced substantial amount of output and she was discharged with the drain to receive 14 days of oral doxycycline.  Since that time the drainage has decreased in quantity and is serous.  She is seeing  in 2 days and she is anticipating removal of the drain.  She is tolerating the doxycycline without escalation of diarrhea though she has been having difficulties with yeast vaginitis.  She had hypoxemia during a previous admission with some interstitial edema which is resolved.  O2 saturation on room air was 98% today.    Review of patient's allergies indicates:   Allergen Reactions    Ciprofloxacin Other (See Comments)     Elevated liver enzymes      Pcn [penicillins] Anaphylaxis     Past Medical History:   Diagnosis Date    " Chronic kidney disease, stage III (moderate) 1/7/2018    Diabetes mellitus, type 2     Fatigue     Hypertension     Vulvar cancer 1/7/2019      essential hypertension,   Thyroid dysfunction  Vulvular cancer status post oophorectomy hysterectomy radiation and chemotherapy in 2013,   H/O gangrenous colon and small bowel d/t surgical sponge left in abd after hysterectomy s/p resection in 1988  small bowel obstruction ,gangrenous bowel status post multiple exploratory laps with resection of her ileum and cecum and primary anastomosis on 07/31/2014   Cholecystectomy  fatty liver  Nonobstructing kidney stones  Back surgery and neck surgery with fusions/hardware  right sided portacath  chronic diarrhea, due to short gut syndrome, controlled now on Gattax  Group c strep bacteremia due to perineal/buttock cellulitis and exacerbation of COPD versus acute bronchitis vs ALI from sepsis 9/2017  admitted for fever, treated again for cellulitis, dc on clinda 9/27/17  Cellulitis of the mons pubis October 2017  Admit December 2017 for acute kidney injury and possible perineal cellulitis  Right lower lobe pneumonia May 2018 with COPD exacerbation  Admission for multiple electrolyte abnormalities October 2018  Admission for acute on chronic kidney disease stage 5, March 2019  Admission for severe and symptomatic anemia 05/12/2019  Creation of left upper arm AV fistula with basilic vein transferred 06/05/2019  Mild depression of total IgG, January 2019  Past Surgical History:   Procedure Laterality Date    ABDOMINAL SURGERY      BACK SURGERY      CERVICAL FUSION      x 4    cervix repair      CHOLECYSTECTOMY  2000    HYSTERECTOMY  1980    LUMBAR LAMINECTOMY      NECK SURGERY      SHOULDER SURGERY      vulva cancer       Social History     Tobacco Use    Smoking status: Former Smoker     Packs/day: 1.00     Years: 33.00     Pack years: 33.00     Types: Cigarettes     Last attempt to quit: 7/22/2000     Years since  "quittin.0    Smokeless tobacco: Never Used   Substance Use Topics    Alcohol use: No     Social History     Occupational History    Not on file     Family History   Problem Relation Age of Onset    Heart disease Mother     Hypertension Mother     Cancer Mother         lung    Heart disease Father     Heart disease Sister         Open heart surgery    No Known Problems Daughter          Review of Systems    Constitutional: No fever, chills, sweats, fatigue, weakness, and she has had a substantial amount of weight loss since last year    Eyes: No change in vision     ENT: No sore throat, mouth pains, or lesions    Cardiovascular: No chest pain, ALLRED, or pedal edema    Respiratory: No shortness of breath, ALLRED,     Gastrointestinal: No abdominal pain, nausea, vomiting, and she controls her chronic diarrhea with Imodium.  She is having approximately 4 stools per day., constipation     Genitourinary:      Musculoskeletal: No new pain, joint swelling, or injuries    Integumentary: No new rashes, skin lesions, or wounds    Neurological: No dizziness, vertigo, unusual headaches,  loss of vision, falls    Psychiatric: No anxiety, depression    Endocrine: Blood sugars are well controlled    Lymphatic: No lymphadenopathy, blood loss, but she is receiving Procrit injections as an outpatient    VAD:     Objective:      Blood pressure 120/72, pulse 77, temperature 98.1 °F (36.7 °C), temperature source Temporal, height 5' 6" (1.676 m), weight 68 kg (150 lb), SpO2 98 %. Body mass index is 24.21 kg/m².  Physical Exam      General: Alert and attentive, cooperative and in no distress.  Pale and thin    Eyes: Pupils equal, round, reactive to light, anicteric, EOMI    Neck: Supple, non-tender, no thyromegaly or masses    ENT: EAC patent,  nares patent, no oral lesions, teeth in good condition, no thrush    Cardiovascular: Regular rate and rhythm, no murmurs, rubs, or gallop    Respiratory: Lungs clear without wheezes, rales, " rubs or rhonci    Gastrointestinal:  No distention    Genitourinary:      Integumentary: Skin without rashes,      Vascular: No peripheral edema or phlebitis, warm and well perfused    Musculoskeletal: Ambulates without difficulty, no acute arthritis, synovitis or myositis.     Lymphatic: No cervical, axillary, or inguinal LAD    Neurological: Normal LOC, cranial nerves, speech,   gait    Psychiatric: Normal mood, speech,  demeanor     Wound:  Left arm wound is approximately 15 cm in length.  There is only 1 spot where it has not healed together.  There is a JOHN drain with approximately 10 cc of serous fluid in the bulb.  There is no cellulitis and there is no swelling.  There is a palpable thrill    VAD:        Recent Diagnostics:          Assessment and Plan:           Wound infection after surgery    MRSA (methicillin resistant Staphylococcus aureus)    Pre-transplant evaluation for chronic kidney disease    Chronic diarrhea    Weight loss    Other orders  -     Discontinue: fluconazole (DIFLUCAN) 100 MG tablet; Take one tablet every other day as needed for yeast infection  Dispense: 10 tablet; Refill: 1  -     fluconazole (DIFLUCAN) 100 MG tablet; Take one tablet every other day as needed for yeast infection  Dispense: 10 tablet; Refill: 1          This note was created using Dragon voice recognition software that occasionally misinterpreted phrases or words.

## 2019-08-07 ENCOUNTER — EXTERNAL HOME HEALTH (OUTPATIENT)
Dept: HOME HEALTH SERVICES | Facility: HOSPITAL | Age: 69
End: 2019-08-07
Payer: MEDICARE

## 2019-08-07 ENCOUNTER — INFUSION (OUTPATIENT)
Dept: INFUSION THERAPY | Facility: HOSPITAL | Age: 69
End: 2019-08-07
Attending: INTERNAL MEDICINE
Payer: MEDICARE

## 2019-08-07 VITALS
BODY MASS INDEX: 24.2 KG/M2 | TEMPERATURE: 99 F | HEART RATE: 87 BPM | HEIGHT: 66 IN | RESPIRATION RATE: 18 BRPM | SYSTOLIC BLOOD PRESSURE: 109 MMHG | WEIGHT: 150.56 LBS | DIASTOLIC BLOOD PRESSURE: 66 MMHG

## 2019-08-07 DIAGNOSIS — D51.8 ANEMIA OF DECREASED VITAMIN B12 ABSORPTION: ICD-10-CM

## 2019-08-07 DIAGNOSIS — N18.5 ANEMIA DUE TO STAGE 5 CHRONIC KIDNEY DISEASE TREATED WITH ERYTHROPOIETIN: Primary | ICD-10-CM

## 2019-08-07 DIAGNOSIS — D63.1 ANEMIA DUE TO STAGE 5 CHRONIC KIDNEY DISEASE TREATED WITH ERYTHROPOIETIN: Primary | ICD-10-CM

## 2019-08-07 PROCEDURE — 63600175 PHARM REV CODE 636 W HCPCS: Mod: JG | Performed by: INTERNAL MEDICINE

## 2019-08-07 PROCEDURE — 96372 THER/PROPH/DIAG INJ SC/IM: CPT

## 2019-08-07 RX ORDER — CYANOCOBALAMIN 1000 UG/ML
1000 INJECTION, SOLUTION INTRAMUSCULAR; SUBCUTANEOUS
Status: CANCELLED | OUTPATIENT
Start: 2019-08-21

## 2019-08-07 RX ADMIN — EPOETIN ALFA 15000 UNITS: 20000 SOLUTION INTRAVENOUS; SUBCUTANEOUS at 08:08

## 2019-08-19 ENCOUNTER — LAB VISIT (OUTPATIENT)
Dept: LAB | Facility: HOSPITAL | Age: 69
End: 2019-08-19
Attending: INTERNAL MEDICINE
Payer: MEDICARE

## 2019-08-19 DIAGNOSIS — N18.9 ANEMIA OF CHRONIC RENAL FAILURE: ICD-10-CM

## 2019-08-19 DIAGNOSIS — N18.5 CHRONIC KIDNEY DISEASE, STAGE V: Primary | ICD-10-CM

## 2019-08-19 DIAGNOSIS — D63.1 ANEMIA OF CHRONIC RENAL FAILURE: ICD-10-CM

## 2019-08-19 LAB
BASOPHILS # BLD AUTO: 0.03 K/UL (ref 0–0.2)
BASOPHILS NFR BLD: 0.6 % (ref 0–1.9)
DIFFERENTIAL METHOD: ABNORMAL
EOSINOPHIL # BLD AUTO: 0.2 K/UL (ref 0–0.5)
EOSINOPHIL NFR BLD: 3.2 % (ref 0–8)
ERYTHROCYTE [DISTWIDTH] IN BLOOD BY AUTOMATED COUNT: 14 % (ref 11.5–14.5)
HCT VFR BLD AUTO: 31 % (ref 37–48.5)
HGB BLD-MCNC: 10.1 G/DL (ref 12–16)
IMM GRANULOCYTES # BLD AUTO: 0.02 K/UL (ref 0–0.04)
IMM GRANULOCYTES NFR BLD AUTO: 0.4 % (ref 0–0.5)
LYMPHOCYTES # BLD AUTO: 1.3 K/UL (ref 1–4.8)
LYMPHOCYTES NFR BLD: 26.5 % (ref 18–48)
MCH RBC QN AUTO: 32.2 PG (ref 27–31)
MCHC RBC AUTO-ENTMCNC: 32.6 G/DL (ref 32–36)
MCV RBC AUTO: 99 FL (ref 82–98)
MONOCYTES # BLD AUTO: 0.4 K/UL (ref 0.3–1)
MONOCYTES NFR BLD: 7.9 % (ref 4–15)
NEUTROPHILS # BLD AUTO: 3 K/UL (ref 1.8–7.7)
NEUTROPHILS NFR BLD: 61.4 % (ref 38–73)
NRBC BLD-RTO: 0 /100 WBC
PLATELET # BLD AUTO: 198 K/UL (ref 150–350)
PMV BLD AUTO: 10.9 FL (ref 9.2–12.9)
RBC # BLD AUTO: 3.14 M/UL (ref 4–5.4)
WBC # BLD AUTO: 4.95 K/UL (ref 3.9–12.7)

## 2019-08-19 PROCEDURE — 85025 COMPLETE CBC W/AUTO DIFF WBC: CPT

## 2019-08-26 DIAGNOSIS — M25.50 ARTHRALGIA, UNSPECIFIED JOINT: ICD-10-CM

## 2019-08-26 RX ORDER — TRAMADOL HYDROCHLORIDE 50 MG/1
50 TABLET ORAL
Qty: 90 TABLET | Refills: 0 | OUTPATIENT
Start: 2019-08-26

## 2019-08-27 ENCOUNTER — OFFICE VISIT (OUTPATIENT)
Dept: FAMILY MEDICINE | Facility: CLINIC | Age: 69
End: 2019-08-27
Payer: MEDICARE

## 2019-08-27 VITALS
DIASTOLIC BLOOD PRESSURE: 76 MMHG | HEART RATE: 82 BPM | SYSTOLIC BLOOD PRESSURE: 120 MMHG | RESPIRATION RATE: 16 BRPM | BODY MASS INDEX: 22.98 KG/M2 | HEIGHT: 66 IN | WEIGHT: 143 LBS | TEMPERATURE: 98 F

## 2019-08-27 DIAGNOSIS — M25.50 ARTHRALGIA, UNSPECIFIED JOINT: ICD-10-CM

## 2019-08-27 DIAGNOSIS — R10.13 EPIGASTRIC PAIN: ICD-10-CM

## 2019-08-27 DIAGNOSIS — Z99.2 END-STAGE RENAL DISEASE ON HEMODIALYSIS: ICD-10-CM

## 2019-08-27 DIAGNOSIS — M79.602 ARM PAIN, DIFFUSE, LEFT: Primary | ICD-10-CM

## 2019-08-27 DIAGNOSIS — N18.6 END-STAGE RENAL DISEASE ON HEMODIALYSIS: ICD-10-CM

## 2019-08-27 DIAGNOSIS — K52.9 CHRONIC DIARRHEA OF UNKNOWN ORIGIN: ICD-10-CM

## 2019-08-27 PROCEDURE — 99214 OFFICE O/P EST MOD 30 MIN: CPT | Mod: S$GLB,,, | Performed by: INTERNAL MEDICINE

## 2019-08-27 PROCEDURE — 99999 PR PBB SHADOW E&M-EST. PATIENT-LVL IV: ICD-10-PCS | Mod: PBBFAC,,, | Performed by: INTERNAL MEDICINE

## 2019-08-27 PROCEDURE — 1101F PT FALLS ASSESS-DOCD LE1/YR: CPT | Mod: S$GLB,,, | Performed by: INTERNAL MEDICINE

## 2019-08-27 PROCEDURE — 99999 PR PBB SHADOW E&M-EST. PATIENT-LVL IV: CPT | Mod: PBBFAC,,, | Performed by: INTERNAL MEDICINE

## 2019-08-27 PROCEDURE — 1101F PR PT FALLS ASSESS DOC 0-1 FALLS W/OUT INJ PAST YR: ICD-10-PCS | Mod: S$GLB,,, | Performed by: INTERNAL MEDICINE

## 2019-08-27 PROCEDURE — 99214 PR OFFICE/OUTPT VISIT, EST, LEVL IV, 30-39 MIN: ICD-10-PCS | Mod: S$GLB,,, | Performed by: INTERNAL MEDICINE

## 2019-08-27 RX ORDER — TRAMADOL HYDROCHLORIDE 50 MG/1
50 TABLET ORAL EVERY 4 HOURS PRN
Qty: 42 TABLET | Refills: 0 | Status: SHIPPED | OUTPATIENT
Start: 2019-08-27 | End: 2019-10-17 | Stop reason: SDUPTHER

## 2019-08-27 NOTE — PROGRESS NOTES
"  SUBJECTIVE:    Patient ID: Shara Hutchins is a 69 y.o. female.    Chief Complaint: Hospital Follow Up (arm infection)    HPI     Patient comes in for recheck.  She is a 69-year-old was brought the emergency room in early August for altered mental status are temperature was 104° and she had evidence of left upper extremity AV fistula wound.  (This patient is being evaluated renal transplant in the parentheses an ultrasound demonstrated a large fluid collection.  Blood cultures were obtained on vancomycin and meropenem were.  MRI and MRA were performed to exclude acute central nervous system injury and these were negative.  She was acidotic with a lactic acid was normal.  It was felt that the altered mental status was secondary to sepsis.  She has chronic kidney disease stage 5 and had metabolic acidosis on admission.  She also has a history of chronic diarrhea secondary to short gut syndrome.  Her left upper extremity grew MRSA and she received vancomycin during hospitalization.  She had a JOHN drain in place which produced substantial amount output and she was discharged with the drain and received 14 days of oral doxycycline.  Drain was removed by Dr. Dozier.    Laboratory studies now include hemoglobin 10.1 and hematocrit of 31.0.  The white count is normal, platelets 198,ooo.    Pt has no appetite at all and weight loss continues-she is very weak-    Pt states the renal transplant team appointment has been delayed due to the healing of the shunt site-    Last 3 sets of Vitals    Vitals - 1 value per visit 8/5/2019 8/7/2019 8/27/2019   SYSTOLIC 120 109 120   DIASTOLIC 72 66 76   PULSE 77 87 82   TEMPERATURE 98.1 98.6 98.3   RESPIRATIONS - 18 16   SPO2 98 - -   Weight (lb) 150 150.57 143   Weight (kg) 68.04 68.3 64.864   HEIGHT 5' 6" 5' 6" 5' 6"   BODY MASS INDEX 24.21 24.3 23.08   VISIT REPORT - - -   Pain Score  0 0 2       Lab Visit on 08/19/2019   Component Date Value Ref Range Status    WBC 08/19/2019 4.95  " 3.90 - 12.70 K/uL Final    RBC 08/19/2019 3.14* 4.00 - 5.40 M/uL Final    Hemoglobin 08/19/2019 10.1* 12.0 - 16.0 g/dL Final    Hematocrit 08/19/2019 31.0* 37.0 - 48.5 % Final    Mean Corpuscular Volume 08/19/2019 99* 82 - 98 fL Final    Mean Corpuscular Hemoglobin 08/19/2019 32.2* 27.0 - 31.0 pg Final    Mean Corpuscular Hemoglobin Conc 08/19/2019 32.6  32.0 - 36.0 g/dL Final    RDW 08/19/2019 14.0  11.5 - 14.5 % Final    Platelets 08/19/2019 198  150 - 350 K/uL Final    MPV 08/19/2019 10.9  9.2 - 12.9 fL Final    Immature Granulocytes 08/19/2019 0.4  0.0 - 0.5 % Final    Gran # (ANC) 08/19/2019 3.0  1.8 - 7.7 K/uL Final    Immature Grans (Abs) 08/19/2019 0.02  0.00 - 0.04 K/uL Final    Lymph # 08/19/2019 1.3  1.0 - 4.8 K/uL Final    Mono # 08/19/2019 0.4  0.3 - 1.0 K/uL Final    Eos # 08/19/2019 0.2  0.0 - 0.5 K/uL Final    Baso # 08/19/2019 0.03  0.00 - 0.20 K/uL Final    nRBC 08/19/2019 0  0 /100 WBC Final    Gran% 08/19/2019 61.4  38.0 - 73.0 % Final    Lymph% 08/19/2019 26.5  18.0 - 48.0 % Final    Mono% 08/19/2019 7.9  4.0 - 15.0 % Final    Eosinophil% 08/19/2019 3.2  0.0 - 8.0 % Final    Basophil% 08/19/2019 0.6  0.0 - 1.9 % Final    Differential Method 08/19/2019 Automated   Final   Lab Visit on 08/05/2019   Component Date Value Ref Range Status    WBC 08/05/2019 5.54  3.90 - 12.70 K/uL Final    RBC 08/05/2019 2.76* 4.00 - 5.40 M/uL Final    Hemoglobin 08/05/2019 8.7* 12.0 - 16.0 g/dL Final    Hematocrit 08/05/2019 28.6* 37.0 - 48.5 % Final    Mean Corpuscular Volume 08/05/2019 104* 82 - 98 fL Final    Mean Corpuscular Hemoglobin 08/05/2019 31.5* 27.0 - 31.0 pg Final    Mean Corpuscular Hemoglobin Conc 08/05/2019 30.4* 32.0 - 36.0 g/dL Final    RDW 08/05/2019 14.2  11.5 - 14.5 % Final    Platelets 08/05/2019 260  150 - 350 K/uL Final    MPV 08/05/2019 9.7  9.2 - 12.9 fL Final    Immature Granulocytes 08/05/2019 0.5  0.0 - 0.5 % Final    Gran # (ANC) 08/05/2019 3.6  1.8 -  7.7 K/uL Final    Immature Grans (Abs) 08/05/2019 0.03  0.00 - 0.04 K/uL Final    Lymph # 08/05/2019 1.3  1.0 - 4.8 K/uL Final    Mono # 08/05/2019 0.4  0.3 - 1.0 K/uL Final    Eos # 08/05/2019 0.1  0.0 - 0.5 K/uL Final    Baso # 08/05/2019 0.06  0.00 - 0.20 K/uL Final    nRBC 08/05/2019 0  0 /100 WBC Final    Gran% 08/05/2019 65.7  38.0 - 73.0 % Final    Lymph% 08/05/2019 24.2  18.0 - 48.0 % Final    Mono% 08/05/2019 6.7  4.0 - 15.0 % Final    Eosinophil% 08/05/2019 1.8  0.0 - 8.0 % Final    Basophil% 08/05/2019 1.1  0.0 - 1.9 % Final    Differential Method 08/05/2019 Automated   Final   Lab Visit on 07/30/2019   Component Date Value Ref Range Status    WBC 07/30/2019 11.18  3.90 - 12.70 K/uL Final    RBC 07/30/2019 2.81* 4.00 - 5.40 M/uL Final    Hemoglobin 07/30/2019 9.0* 12.0 - 16.0 g/dL Final    Hematocrit 07/30/2019 29.4* 37.0 - 48.5 % Final    Mean Corpuscular Volume 07/30/2019 105* 82 - 98 fL Final    Mean Corpuscular Hemoglobin 07/30/2019 32.0* 27.0 - 31.0 pg Final    Mean Corpuscular Hemoglobin Conc 07/30/2019 30.6* 32.0 - 36.0 g/dL Final    RDW 07/30/2019 14.7* 11.5 - 14.5 % Final    Platelets 07/30/2019 395* 150 - 350 K/uL Final    MPV 07/30/2019 10.5  9.2 - 12.9 fL Final    Immature Granulocytes 07/30/2019 1.3* 0.0 - 0.5 % Final    Gran # (ANC) 07/30/2019 8.0* 1.8 - 7.7 K/uL Final    Immature Grans (Abs) 07/30/2019 0.15* 0.00 - 0.04 K/uL Final    Lymph # 07/30/2019 2.2  1.0 - 4.8 K/uL Final    Mono # 07/30/2019 0.6  0.3 - 1.0 K/uL Final    Eos # 07/30/2019 0.2  0.0 - 0.5 K/uL Final    Baso # 07/30/2019 0.05  0.00 - 0.20 K/uL Final    nRBC 07/30/2019 0  0 /100 WBC Final    Gran% 07/30/2019 71.6  38.0 - 73.0 % Final    Lymph% 07/30/2019 19.4  18.0 - 48.0 % Final    Mono% 07/30/2019 5.5  4.0 - 15.0 % Final    Eosinophil% 07/30/2019 1.8  0.0 - 8.0 % Final    Basophil% 07/30/2019 0.4  0.0 - 1.9 % Final    Differential Method 07/30/2019 Automated   Final    Sodium  07/30/2019 141  136 - 145 mmol/L Final    Potassium 07/30/2019 4.9  3.5 - 5.1 mmol/L Final    Chloride 07/30/2019 110  95 - 110 mmol/L Final    CO2 07/30/2019 24  23 - 29 mmol/L Final    Glucose 07/30/2019 85  70 - 110 mg/dL Final    BUN, Bld 07/30/2019 28* 8 - 23 mg/dL Final    Creatinine 07/30/2019 2.5* 0.5 - 1.4 mg/dL Final    Calcium 07/30/2019 9.4  8.7 - 10.5 mg/dL Final    Total Protein 07/30/2019 6.5  6.0 - 8.4 g/dL Final    Albumin 07/30/2019 3.4* 3.5 - 5.2 g/dL Final    Total Bilirubin 07/30/2019 0.4  0.1 - 1.0 mg/dL Final    Alkaline Phosphatase 07/30/2019 115  55 - 135 U/L Final    AST 07/30/2019 29  10 - 40 U/L Final    ALT 07/30/2019 15  10 - 44 U/L Final    Anion Gap 07/30/2019 7* 8 - 16 mmol/L Final    eGFR if  07/30/2019 21.9* >60 mL/min/1.73 m^2 Final    eGFR if non African American 07/30/2019 19.0* >60 mL/min/1.73 m^2 Final    Phosphorus 07/30/2019 4.2  2.7 - 4.5 mg/dL Final    Iron 07/30/2019 86  30 - 160 ug/dL Final    Transferrin 07/30/2019 177* 200 - 375 mg/dL Final    TIBC 07/30/2019 248* 250 - 450 ug/dL Final    Saturated Iron 07/30/2019 35  20 - 50 % Final    Ferritin 07/30/2019 877* 20.0 - 300.0 ng/mL Final    Protein, Urine Random 07/30/2019 57* 0 - 15 mg/dL Final    Creatinine, Random Ur 07/30/2019 135.0  15.0 - 325.0 mg/dL Final   Office Visit on 07/10/2019   Component Date Value Ref Range Status    Glucose 07/11/2019 87  70 - 99 mg/dL Final    BUN, Bld 07/11/2019 25* 8 - 20 mg/dL Final    Creatinine 07/11/2019 3.58* 0.60 - 1.40 mg/dL Final    Calcium 07/11/2019 8.9  7.7 - 10.4 mg/dL Final    Sodium 07/11/2019 145* 134 - 144 mmol/L Final    Potassium 07/11/2019 2.8* 3.5 - 5.0 mmol/L Final    Chloride 07/11/2019 115* 98 - 110 mmol/L Final    CO2 07/11/2019 19.9* 22.8 - 31.6 mmol/L Final    Albumin 07/11/2019 4.0  3.1 - 4.7 g/dL Final    Total Bilirubin 07/11/2019 0.5  0.3 - 1.0 mg/dL Final    Alkaline Phosphatase 07/11/2019 166* 40 - 104  IU/L Final    Total Protein 07/11/2019 6.5  6.0 - 8.2 g/dL Final    ALT (SGPT) 07/11/2019 21  3 - 33 IU/L Final    AST 07/11/2019 32  10 - 40 IU/L Final    Magnesium 07/11/2019 1.8  1.5 - 2.6 mg/dL Final    Hemoglobin A1C 07/11/2019 5.1  3.1 - 6.5 % Final   Telephone on 04/10/2019   Component Date Value Ref Range Status    Potassium 04/17/2019 3.8  3.5 - 5.0 mmol/L Final   Office Visit on 04/08/2019   Component Date Value Ref Range Status    Magnesium 04/09/2019 1.4* 1.5 - 2.6 mg/dL Final    Glucose 04/09/2019 90  70 - 99 mg/dL Final    BUN, Bld 04/09/2019 32* 8 - 20 mg/dL Final    Creatinine 04/09/2019 4.09* 0.60 - 1.40 mg/dL Final    Calcium 04/09/2019 8.0  7.7 - 10.4 mg/dL Final    Sodium 04/09/2019 140  134 - 144 mmol/L Final    Potassium 04/09/2019 3.0* 3.5 - 5.0 mmol/L Final    Chloride 04/09/2019 113* 98 - 110 mmol/L Final    CO2 04/09/2019 19.3* 22.8 - 31.6 mmol/L Final    WBC 04/09/2019 6.3  5.0 - 10.0 K/uL Final    RBC 04/09/2019 3.05* 3.50 - 5.50 M/uL Final    Hemoglobin 04/09/2019 9.0* 12.0 - 15.0 g/dL Final    Hematocrit 04/09/2019 28.1* 36.0 - 48.0 % Final    Mean Corpuscular Volume 04/09/2019 92.1  79.0 - 98.0 fL Final    Mean Corpuscular Hemoglobin 04/09/2019 29.5  25.0 - 35.0 pg Final    Mean Corpuscular Hemoglobin Conc 04/09/2019 32.0  31.0 - 36.0 g/dL Final    RDW 04/09/2019 15.1* 11.7 - 14.9 % Final    Platelets 04/09/2019 194  140 - 440 K/uL Final    MPV 04/09/2019 11.1  8.8 - 12.7 fL Final    Gran% 04/09/2019 55.6  % Final    Lymph% 04/09/2019 33.0  % Final    Mono% 04/09/2019 7.1  % Final    Eosinophil% 04/09/2019 3.5  % Final    Basophil% 04/09/2019 0.3  % Final    Gran # (ANC) 04/09/2019 3.5  1.4 - 6.5 K/uL Final    Lymph # 04/09/2019 2.1  1.2 - 3.4 K/uL Final    Mono # 04/09/2019 0.5  0.1 - 0.6 K/uL Final    Eos # 04/09/2019 0.2  0.0 - 0.7 K/uL Final    Baso # 04/09/2019 0.0  0.0 - 0.2 K/uL Final    Immature Grans (Abs) 04/09/2019 0.0  0.0 - 1.0 K/uL  Final    Immature Granulocytes 04/09/2019 0.5  % Final    nRBC% 04/09/2019 0  % Final       Past Medical History:   Diagnosis Date    Chronic kidney disease, stage III (moderate) 1/7/2018    Diabetes mellitus, type 2     Fatigue     Hypertension     Vulvar cancer 1/7/2019     Past Surgical History:   Procedure Laterality Date    ABDOMINAL SURGERY      BACK SURGERY      CERVICAL FUSION      x 4    cervix repair      CHOLECYSTECTOMY  2000    HYSTERECTOMY  1980    LUMBAR LAMINECTOMY      NECK SURGERY      SHOULDER SURGERY      vulva cancer       Family History   Problem Relation Age of Onset    Heart disease Mother     Hypertension Mother     Cancer Mother         lung    Heart disease Father     Heart disease Sister         Open heart surgery    No Known Problems Daughter        Marital Status:   Alcohol History:  reports that she does not drink alcohol.  Tobacco History:  reports that she quit smoking about 19 years ago. Her smoking use included cigarettes. She has a 33.00 pack-year smoking history. She has never used smokeless tobacco.  Drug History:  reports that she does not use drugs.    Review of patient's allergies indicates:   Allergen Reactions    Ciprofloxacin Other (See Comments)     Elevated liver enzymes      Pcn [penicillins] Anaphylaxis       Current Outpatient Medications:     calcitRIOL (ROCALTROL) 0.25 MCG Cap, Take 1 capsule (0.25 mcg total) by mouth once daily., Disp: 90 capsule, Rfl: 1    CARTIA  mg Cp24, TAKE 1 CAPSULE DAILY, Disp: 90 capsule, Rfl: 0    cyclobenzaprine (FLEXERIL) 10 MG tablet, Take 1 tablet (10 mg total) by mouth 2 (two) times daily., Disp: 180 tablet, Rfl: 1    dicyclomine (BENTYL) 10 MG capsule, Take 1 capsule (10 mg total) by mouth 3 (three) times daily., Disp: 270 capsule, Rfl: 0    ferrous sulfate 325 mg (65 mg iron) CpSR, Take 1 tablet by mouth once daily., Disp: 90 capsule, Rfl: 1    levothyroxine (SYNTHROID) 88 MCG tablet, TAKE  1 TABLET DAILY, Disp: 90 tablet, Rfl: 1    magnesium oxide (MAG-OX) 400 mg tablet, Take 1 tablet (400 mg total) by mouth once daily. (Patient taking differently: Take 400 mg by mouth 2 (two) times daily. ), Disp: 90 tablet, Rfl: 1    potassium chloride SA (K-DUR,KLOR-CON) 20 MEQ tablet, Take 20 MEQ by mouth 2 times daily.ER Wax Tablets needed for easier swallowing.Closest substitute ok, Disp: 100 tablet, Rfl: 3    promethazine (PHENERGAN) 25 MG tablet, Take 1 tablet (25 mg total) by mouth daily as needed for Nausea., Disp: 90 tablet, Rfl: 0    sodium bicarbonate 650 MG tablet, Take 650 mg by mouth 3 (three) times daily. , Disp: , Rfl: 2    traMADol (ULTRAM) 50 mg tablet, Take 1 tablet (50 mg total) by mouth every 4 (four) hours as needed for Pain., Disp: 42 tablet, Rfl: 0    fluconazole (DIFLUCAN) 100 MG tablet, Take one tablet every other day as needed for yeast infection, Disp: 10 tablet, Rfl: 1    Review of Systems   Constitutional: Negative for appetite change (no appetite), chills, diaphoresis, fatigue, fever and unexpected weight change.   HENT: Negative for congestion, ear pain, hearing loss, nosebleeds, postnasal drip, sinus pressure, sinus pain, sneezing, sore throat, tinnitus, trouble swallowing and voice change.    Eyes: Negative for photophobia, pain, itching and visual disturbance.   Respiratory: Negative for apnea, cough, chest tightness, shortness of breath, wheezing and stridor.    Cardiovascular: Negative for chest pain, palpitations and leg swelling.   Gastrointestinal: Negative for abdominal distention, abdominal pain (cramping), blood in stool, constipation, diarrhea (chronic-stopped rx-takes pepto and immodium constantly), nausea and vomiting.   Endocrine: Negative for cold intolerance, heat intolerance, polydipsia and polyuria.   Genitourinary: Negative for difficulty urinating, dyspareunia, dysuria, flank pain, frequency, hematuria, menstrual problem, pelvic pain, urgency, vaginal  "discharge and vaginal pain.   Musculoskeletal: Negative for arthralgias, back pain, joint swelling, myalgias, neck pain and neck stiffness.        Left arm pain in area of shunt site   Skin: Negative for pallor.   Allergic/Immunologic: Negative for environmental allergies and food allergies.   Neurological: Negative for dizziness, tremors, speech difficulty, weakness (HPI), light-headedness and numbness.   Hematological: Does not bruise/bleed easily.   Psychiatric/Behavioral: Negative for agitation, confusion, decreased concentration, sleep disturbance (poor due to trips to the bathroom) and suicidal ideas. The patient is not nervous/anxious.           Objective:      Vitals:    08/27/19 0813   BP: 120/76   Pulse: 82   Resp: 16   Temp: 98.3 °F (36.8 °C)   Weight: 64.9 kg (143 lb)   Height: 5' 6" (1.676 m)     Physical Exam   Constitutional: She is oriented to person, place, and time. She appears well-developed. She is cooperative. No distress.   Obvious weight loss-   HENT:   Head: Normocephalic and atraumatic.   Right Ear: Tympanic membrane normal.   Left Ear: Tympanic membrane normal.   Mouth/Throat: Uvula is midline and mucous membranes are normal.   Eyes: Pupils are equal, round, and reactive to light. Conjunctivae and EOM are normal. Right eye exhibits no discharge. Left eye exhibits no discharge. No scleral icterus. Right pupil is round and reactive. Left pupil is round and reactive.   Neck: Trachea normal and normal range of motion. Neck supple. No JVD present. Carotid bruit is not present. No thyromegaly present.   Cardiovascular: Normal rate, regular rhythm and intact distal pulses. Exam reveals no gallop and no friction rub.   No murmur heard.  Pulmonary/Chest: Effort normal and breath sounds normal. No respiratory distress. She has no wheezes. She has no rales.   Abdominal: Soft. Bowel sounds are normal. She exhibits no distension and no mass. There is no tenderness. There is no guarding. No hernia. "   Musculoskeletal: She exhibits no edema.   Neurological: She is alert and oriented to person, place, and time. She has normal strength.   Skin: Skin is warm and dry. Capillary refill takes less than 2 seconds. No lesion and no rash noted. No cyanosis. Nails show no clubbing.   extreme pallor   Psychiatric: She has a normal mood and affect. Her speech is normal and behavior is normal. Judgment and thought content normal.   Nursing note and vitals reviewed.        Assessment:       1. Arm pain, diffuse, left    2. Arthralgia, unspecified joint    3. Epigastric pain    4. End-stage renal disease on hemodialysis    5. Chronic diarrhea of unknown origin         Plan:       Arm pain, diffuse, left  -     traMADol (ULTRAM) 50 mg tablet; Take 1 tablet (50 mg total) by mouth every 4 (four) hours as needed for Pain.  Dispense: 42 tablet; Refill: 0    Arthralgia, unspecified joint  -     traMADol (ULTRAM) 50 mg tablet; Take 1 tablet (50 mg total) by mouth every 4 (four) hours as needed for Pain.  Dispense: 42 tablet; Refill: 0    Epigastric pain  -     traMADol (ULTRAM) 50 mg tablet; Take 1 tablet (50 mg total) by mouth every 4 (four) hours as needed for Pain.  Dispense: 42 tablet; Refill: 0    End-stage renal disease on hemodialysis        -     Per renal    Chronic diarrhea of unknown origin        -     Sx therapy        Follow up in about 6 weeks (around 10/8/2019).        8/27/2019 April Horton M.D.

## 2019-09-03 ENCOUNTER — LAB VISIT (OUTPATIENT)
Dept: LAB | Facility: HOSPITAL | Age: 69
End: 2019-09-03
Attending: INTERNAL MEDICINE
Payer: MEDICARE

## 2019-09-03 DIAGNOSIS — N18.9 ANEMIA OF CHRONIC RENAL FAILURE: Primary | ICD-10-CM

## 2019-09-03 DIAGNOSIS — N18.5 CHRONIC KIDNEY DISEASE, STAGE V: ICD-10-CM

## 2019-09-03 DIAGNOSIS — D63.1 ANEMIA OF CHRONIC RENAL FAILURE: Primary | ICD-10-CM

## 2019-09-03 LAB
BASOPHILS # BLD AUTO: 0.02 K/UL (ref 0–0.2)
BASOPHILS NFR BLD: 0.3 % (ref 0–1.9)
DIFFERENTIAL METHOD: ABNORMAL
EOSINOPHIL # BLD AUTO: 0.1 K/UL (ref 0–0.5)
EOSINOPHIL NFR BLD: 2.3 % (ref 0–8)
ERYTHROCYTE [DISTWIDTH] IN BLOOD BY AUTOMATED COUNT: 14.3 % (ref 11.5–14.5)
HCT VFR BLD AUTO: 29.6 % (ref 37–48.5)
HGB BLD-MCNC: 9.6 G/DL (ref 12–16)
IMM GRANULOCYTES # BLD AUTO: 0.02 K/UL (ref 0–0.04)
IMM GRANULOCYTES NFR BLD AUTO: 0.3 % (ref 0–0.5)
LYMPHOCYTES # BLD AUTO: 1.4 K/UL (ref 1–4.8)
LYMPHOCYTES NFR BLD: 23.1 % (ref 18–48)
MCH RBC QN AUTO: 32.1 PG (ref 27–31)
MCHC RBC AUTO-ENTMCNC: 32.4 G/DL (ref 32–36)
MCV RBC AUTO: 99 FL (ref 82–98)
MONOCYTES # BLD AUTO: 0.4 K/UL (ref 0.3–1)
MONOCYTES NFR BLD: 5.9 % (ref 4–15)
NEUTROPHILS # BLD AUTO: 4.1 K/UL (ref 1.8–7.7)
NEUTROPHILS NFR BLD: 68.1 % (ref 38–73)
NRBC BLD-RTO: 0 /100 WBC
PLATELET # BLD AUTO: 171 K/UL (ref 150–350)
PMV BLD AUTO: 11 FL (ref 9.2–12.9)
RBC # BLD AUTO: 2.99 M/UL (ref 4–5.4)
WBC # BLD AUTO: 6.07 K/UL (ref 3.9–12.7)

## 2019-09-03 PROCEDURE — 85025 COMPLETE CBC W/AUTO DIFF WBC: CPT

## 2019-09-04 ENCOUNTER — INFUSION (OUTPATIENT)
Dept: INFUSION THERAPY | Facility: HOSPITAL | Age: 69
End: 2019-09-04
Attending: INTERNAL MEDICINE
Payer: MEDICARE

## 2019-09-04 VITALS
HEART RATE: 79 BPM | DIASTOLIC BLOOD PRESSURE: 77 MMHG | WEIGHT: 146.38 LBS | RESPIRATION RATE: 16 BRPM | SYSTOLIC BLOOD PRESSURE: 130 MMHG | HEIGHT: 67 IN | TEMPERATURE: 98 F | BODY MASS INDEX: 22.98 KG/M2

## 2019-09-04 DIAGNOSIS — N18.5 ANEMIA DUE TO STAGE 5 CHRONIC KIDNEY DISEASE TREATED WITH ERYTHROPOIETIN: Primary | ICD-10-CM

## 2019-09-04 DIAGNOSIS — D63.1 ANEMIA DUE TO STAGE 5 CHRONIC KIDNEY DISEASE TREATED WITH ERYTHROPOIETIN: Primary | ICD-10-CM

## 2019-09-04 PROCEDURE — 63600175 PHARM REV CODE 636 W HCPCS: Mod: JG | Performed by: INTERNAL MEDICINE

## 2019-09-04 PROCEDURE — 96372 THER/PROPH/DIAG INJ SC/IM: CPT

## 2019-09-04 RX ORDER — CYANOCOBALAMIN 1000 UG/ML
1000 INJECTION, SOLUTION INTRAMUSCULAR; SUBCUTANEOUS
Status: CANCELLED | OUTPATIENT
Start: 2019-09-18

## 2019-09-04 RX ADMIN — EPOETIN ALFA 15000 UNITS: 20000 SOLUTION INTRAVENOUS; SUBCUTANEOUS at 08:09

## 2019-09-16 ENCOUNTER — LAB VISIT (OUTPATIENT)
Dept: LAB | Facility: HOSPITAL | Age: 69
End: 2019-09-16
Attending: INTERNAL MEDICINE
Payer: MEDICARE

## 2019-09-16 DIAGNOSIS — D63.1 ANEMIA OF CHRONIC RENAL FAILURE: Primary | ICD-10-CM

## 2019-09-16 DIAGNOSIS — N18.5 CHRONIC KIDNEY DISEASE, STAGE V: ICD-10-CM

## 2019-09-16 DIAGNOSIS — N18.9 ANEMIA OF CHRONIC RENAL FAILURE: Primary | ICD-10-CM

## 2019-09-16 LAB
BASOPHILS # BLD AUTO: 0.02 K/UL (ref 0–0.2)
BASOPHILS NFR BLD: 0.4 % (ref 0–1.9)
DIFFERENTIAL METHOD: ABNORMAL
EOSINOPHIL # BLD AUTO: 0.2 K/UL (ref 0–0.5)
EOSINOPHIL NFR BLD: 2.9 % (ref 0–8)
ERYTHROCYTE [DISTWIDTH] IN BLOOD BY AUTOMATED COUNT: 15.7 % (ref 11.5–14.5)
HCT VFR BLD AUTO: 30.8 % (ref 37–48.5)
HGB BLD-MCNC: 10 G/DL (ref 12–16)
IMM GRANULOCYTES # BLD AUTO: 0.02 K/UL (ref 0–0.04)
IMM GRANULOCYTES NFR BLD AUTO: 0.4 % (ref 0–0.5)
LYMPHOCYTES # BLD AUTO: 1.5 K/UL (ref 1–4.8)
LYMPHOCYTES NFR BLD: 28.3 % (ref 18–48)
MCH RBC QN AUTO: 32.3 PG (ref 27–31)
MCHC RBC AUTO-ENTMCNC: 32.5 G/DL (ref 32–36)
MCV RBC AUTO: 99 FL (ref 82–98)
MONOCYTES # BLD AUTO: 0.4 K/UL (ref 0.3–1)
MONOCYTES NFR BLD: 7.4 % (ref 4–15)
NEUTROPHILS # BLD AUTO: 3.1 K/UL (ref 1.8–7.7)
NEUTROPHILS NFR BLD: 60.6 % (ref 38–73)
NRBC BLD-RTO: 0 /100 WBC
PLATELET # BLD AUTO: 168 K/UL (ref 150–350)
PMV BLD AUTO: 10.6 FL (ref 9.2–12.9)
RBC # BLD AUTO: 3.1 M/UL (ref 4–5.4)
WBC # BLD AUTO: 5.16 K/UL (ref 3.9–12.7)

## 2019-09-16 PROCEDURE — 85025 COMPLETE CBC W/AUTO DIFF WBC: CPT

## 2019-09-18 ENCOUNTER — INFUSION (OUTPATIENT)
Dept: INFUSION THERAPY | Facility: HOSPITAL | Age: 69
End: 2019-09-18
Attending: INTERNAL MEDICINE
Payer: MEDICARE

## 2019-09-18 VITALS
HEIGHT: 67 IN | RESPIRATION RATE: 18 BRPM | SYSTOLIC BLOOD PRESSURE: 134 MMHG | BODY MASS INDEX: 22.92 KG/M2 | TEMPERATURE: 98 F | HEART RATE: 77 BPM | DIASTOLIC BLOOD PRESSURE: 77 MMHG | WEIGHT: 146.06 LBS

## 2019-09-18 DIAGNOSIS — N18.5 ANEMIA DUE TO STAGE 5 CHRONIC KIDNEY DISEASE TREATED WITH ERYTHROPOIETIN: Primary | ICD-10-CM

## 2019-09-18 DIAGNOSIS — D63.1 ANEMIA DUE TO STAGE 5 CHRONIC KIDNEY DISEASE TREATED WITH ERYTHROPOIETIN: Primary | ICD-10-CM

## 2019-09-18 DIAGNOSIS — D51.8 ANEMIA OF DECREASED VITAMIN B12 ABSORPTION: ICD-10-CM

## 2019-09-18 PROCEDURE — 63600175 PHARM REV CODE 636 W HCPCS: Mod: JG | Performed by: INTERNAL MEDICINE

## 2019-09-18 PROCEDURE — 96372 THER/PROPH/DIAG INJ SC/IM: CPT

## 2019-09-18 RX ORDER — CYANOCOBALAMIN 1000 UG/ML
1000 INJECTION, SOLUTION INTRAMUSCULAR; SUBCUTANEOUS
Status: CANCELLED | OUTPATIENT
Start: 2019-10-02

## 2019-09-18 RX ADMIN — EPOETIN ALFA 15000 UNITS: 20000 SOLUTION INTRAVENOUS; SUBCUTANEOUS at 09:09

## 2019-09-18 NOTE — PLAN OF CARE
Problem: Anemia  Goal: Anemia Symptom Improvement    Intervention: Monitor and Manage Anemia  To administer Procrit per MD protocol

## 2019-09-30 ENCOUNTER — LAB VISIT (OUTPATIENT)
Dept: LAB | Facility: HOSPITAL | Age: 69
End: 2019-09-30
Attending: INTERNAL MEDICINE
Payer: MEDICARE

## 2019-09-30 DIAGNOSIS — I51.9 MYXEDEMA HEART DISEASE: ICD-10-CM

## 2019-09-30 DIAGNOSIS — E11.22 TYPE 2 DIABETES MELLITUS WITH END-STAGE RENAL DISEASE: ICD-10-CM

## 2019-09-30 DIAGNOSIS — E87.20 ACIDOSIS: ICD-10-CM

## 2019-09-30 DIAGNOSIS — E03.9 MYXEDEMA HEART DISEASE: ICD-10-CM

## 2019-09-30 DIAGNOSIS — N18.6 TYPE 2 DIABETES MELLITUS WITH END-STAGE RENAL DISEASE: ICD-10-CM

## 2019-09-30 DIAGNOSIS — I12.9 PARENCHYMAL RENAL HYPERTENSION: ICD-10-CM

## 2019-09-30 DIAGNOSIS — N18.5 CHRONIC KIDNEY DISEASE, STAGE V: ICD-10-CM

## 2019-09-30 DIAGNOSIS — R94.4 NONSPECIFIC ABNORMAL RESULTS OF KIDNEY FUNCTION STUDY: Primary | ICD-10-CM

## 2019-09-30 LAB
ALBUMIN SERPL BCP-MCNC: 3.8 G/DL (ref 3.5–5.2)
ALP SERPL-CCNC: 152 U/L (ref 55–135)
ALT SERPL W/O P-5'-P-CCNC: 45 U/L (ref 10–44)
ANION GAP SERPL CALC-SCNC: 6 MMOL/L (ref 8–16)
AST SERPL-CCNC: 56 U/L (ref 10–40)
BASOPHILS # BLD AUTO: 0.04 K/UL (ref 0–0.2)
BASOPHILS NFR BLD: 0.7 % (ref 0–1.9)
BILIRUB SERPL-MCNC: 0.7 MG/DL (ref 0.1–1)
BUN SERPL-MCNC: 30 MG/DL (ref 8–23)
CALCIUM SERPL-MCNC: 8.8 MG/DL (ref 8.7–10.5)
CHLORIDE SERPL-SCNC: 118 MMOL/L (ref 95–110)
CO2 SERPL-SCNC: 18 MMOL/L (ref 23–29)
CREAT SERPL-MCNC: 2.6 MG/DL (ref 0.5–1.4)
DIFFERENTIAL METHOD: ABNORMAL
EOSINOPHIL # BLD AUTO: 0.2 K/UL (ref 0–0.5)
EOSINOPHIL NFR BLD: 3.3 % (ref 0–8)
ERYTHROCYTE [DISTWIDTH] IN BLOOD BY AUTOMATED COUNT: 16 % (ref 11.5–14.5)
EST. GFR  (AFRICAN AMERICAN): 20.9 ML/MIN/1.73 M^2
EST. GFR  (NON AFRICAN AMERICAN): 18.2 ML/MIN/1.73 M^2
GLUCOSE SERPL-MCNC: 94 MG/DL (ref 70–110)
HCT VFR BLD AUTO: 33.9 % (ref 37–48.5)
HGB BLD-MCNC: 10.7 G/DL (ref 12–16)
IMM GRANULOCYTES # BLD AUTO: 0.02 K/UL (ref 0–0.04)
IMM GRANULOCYTES NFR BLD AUTO: 0.3 % (ref 0–0.5)
IRON SERPL-MCNC: 90 UG/DL (ref 30–160)
LYMPHOCYTES # BLD AUTO: 1.6 K/UL (ref 1–4.8)
LYMPHOCYTES NFR BLD: 28.5 % (ref 18–48)
MCH RBC QN AUTO: 32.2 PG (ref 27–31)
MCHC RBC AUTO-ENTMCNC: 31.6 G/DL (ref 32–36)
MCV RBC AUTO: 102 FL (ref 82–98)
MONOCYTES # BLD AUTO: 0.4 K/UL (ref 0.3–1)
MONOCYTES NFR BLD: 7 % (ref 4–15)
NEUTROPHILS # BLD AUTO: 3.5 K/UL (ref 1.8–7.7)
NEUTROPHILS NFR BLD: 60.2 % (ref 38–73)
NRBC BLD-RTO: 0 /100 WBC
PLATELET # BLD AUTO: 188 K/UL (ref 150–350)
PMV BLD AUTO: 11.3 FL (ref 9.2–12.9)
POTASSIUM SERPL-SCNC: 3.4 MMOL/L (ref 3.5–5.1)
PROT SERPL-MCNC: 6.1 G/DL (ref 6–8.4)
PTH-INTACT SERPL-MCNC: 176.4 PG/ML (ref 9–77)
RBC # BLD AUTO: 3.32 M/UL (ref 4–5.4)
SATURATED IRON: 39 % (ref 20–50)
SODIUM SERPL-SCNC: 142 MMOL/L (ref 136–145)
TOTAL IRON BINDING CAPACITY: 230 UG/DL (ref 250–450)
TRANSFERRIN SERPL-MCNC: 164 MG/DL (ref 200–375)
WBC # BLD AUTO: 5.75 K/UL (ref 3.9–12.7)

## 2019-09-30 PROCEDURE — 80053 COMPREHEN METABOLIC PANEL: CPT

## 2019-09-30 PROCEDURE — 83540 ASSAY OF IRON: CPT

## 2019-09-30 PROCEDURE — 85025 COMPLETE CBC W/AUTO DIFF WBC: CPT

## 2019-09-30 PROCEDURE — 36415 COLL VENOUS BLD VENIPUNCTURE: CPT

## 2019-09-30 PROCEDURE — 83970 ASSAY OF PARATHORMONE: CPT

## 2019-10-10 ENCOUNTER — LAB VISIT (OUTPATIENT)
Dept: LAB | Facility: HOSPITAL | Age: 69
End: 2019-10-10
Attending: INTERNAL MEDICINE
Payer: MEDICARE

## 2019-10-10 DIAGNOSIS — N18.9 CHRONIC KIDNEY DISEASE, UNSPECIFIED: Primary | ICD-10-CM

## 2019-10-10 PROCEDURE — 82575 CREATININE CLEARANCE TEST: CPT

## 2019-10-10 PROCEDURE — 36415 COLL VENOUS BLD VENIPUNCTURE: CPT

## 2019-10-12 DIAGNOSIS — I10 ESSENTIAL HYPERTENSION, BENIGN: Chronic | ICD-10-CM

## 2019-10-12 RX ORDER — LEVOTHYROXINE SODIUM 88 UG/1
TABLET ORAL
Qty: 90 TABLET | Refills: 4 | Status: ON HOLD | OUTPATIENT
Start: 2019-10-12 | End: 2020-03-05 | Stop reason: HOSPADM

## 2019-10-12 RX ORDER — DILTIAZEM HYDROCHLORIDE 120 MG/1
CAPSULE, EXTENDED RELEASE ORAL
Qty: 90 CAPSULE | Refills: 4 | Status: SHIPPED | OUTPATIENT
Start: 2019-10-12 | End: 2020-02-10 | Stop reason: CLARIF

## 2019-10-13 DIAGNOSIS — K52.9 INFLAMMATORY BOWEL DISEASE: Chronic | ICD-10-CM

## 2019-10-14 ENCOUNTER — LAB VISIT (OUTPATIENT)
Dept: LAB | Facility: HOSPITAL | Age: 69
End: 2019-10-14
Attending: INTERNAL MEDICINE
Payer: MEDICARE

## 2019-10-14 DIAGNOSIS — D63.1 ANEMIA OF CHRONIC RENAL FAILURE: Primary | ICD-10-CM

## 2019-10-14 DIAGNOSIS — N18.9 ANEMIA OF CHRONIC RENAL FAILURE: Primary | ICD-10-CM

## 2019-10-14 DIAGNOSIS — N18.5 CHRONIC KIDNEY DISEASE, STAGE V: ICD-10-CM

## 2019-10-14 LAB
BASOPHILS # BLD AUTO: 0.02 K/UL (ref 0–0.2)
BASOPHILS NFR BLD: 0.4 % (ref 0–1.9)
DIFFERENTIAL METHOD: ABNORMAL
EOSINOPHIL # BLD AUTO: 0.2 K/UL (ref 0–0.5)
EOSINOPHIL NFR BLD: 3.9 % (ref 0–8)
ERYTHROCYTE [DISTWIDTH] IN BLOOD BY AUTOMATED COUNT: 14.7 % (ref 11.5–14.5)
HCT VFR BLD AUTO: 28 % (ref 37–48.5)
HGB BLD-MCNC: 8.9 G/DL (ref 12–16)
IMM GRANULOCYTES # BLD AUTO: 0.01 K/UL (ref 0–0.04)
IMM GRANULOCYTES NFR BLD AUTO: 0.2 % (ref 0–0.5)
LYMPHOCYTES # BLD AUTO: 1.4 K/UL (ref 1–4.8)
LYMPHOCYTES NFR BLD: 29.1 % (ref 18–48)
MCH RBC QN AUTO: 32.2 PG (ref 27–31)
MCHC RBC AUTO-ENTMCNC: 31.8 G/DL (ref 32–36)
MCV RBC AUTO: 101 FL (ref 82–98)
MONOCYTES # BLD AUTO: 0.3 K/UL (ref 0.3–1)
MONOCYTES NFR BLD: 6.1 % (ref 4–15)
NEUTROPHILS # BLD AUTO: 3 K/UL (ref 1.8–7.7)
NEUTROPHILS NFR BLD: 60.3 % (ref 38–73)
NRBC BLD-RTO: 0 /100 WBC
PLATELET # BLD AUTO: 164 K/UL (ref 150–350)
PMV BLD AUTO: 12 FL (ref 9.2–12.9)
RBC # BLD AUTO: 2.76 M/UL (ref 4–5.4)
WBC # BLD AUTO: 4.91 K/UL (ref 3.9–12.7)

## 2019-10-14 PROCEDURE — 85025 COMPLETE CBC W/AUTO DIFF WBC: CPT

## 2019-10-14 RX ORDER — DICYCLOMINE HYDROCHLORIDE 10 MG/1
CAPSULE ORAL
Qty: 270 CAPSULE | Refills: 4 | Status: ON HOLD | OUTPATIENT
Start: 2019-10-14 | End: 2020-03-06 | Stop reason: HOSPADM

## 2019-10-15 PROBLEM — E11.40 CONTROLLED TYPE 2 DIABETES MELLITUS WITH NEUROPATHY: Status: ACTIVE | Noted: 2018-10-31

## 2019-10-15 PROBLEM — E11.42 DIABETIC POLYNEUROPATHY ASSOCIATED WITH TYPE 2 DIABETES MELLITUS: Chronic | Status: RESOLVED | Noted: 2018-01-07 | Resolved: 2019-10-15

## 2019-10-15 RX ORDER — POTASSIUM CHLORIDE 750 MG/1
20 TABLET, EXTENDED RELEASE ORAL 2 TIMES DAILY
Status: ON HOLD | COMMUNITY
Start: 2019-09-17 | End: 2020-03-05 | Stop reason: HOSPADM

## 2019-10-15 RX ORDER — CALCITRIOL 0.5 UG/1
1 CAPSULE ORAL DAILY
Refills: 3 | Status: ON HOLD | COMMUNITY
Start: 2019-10-03 | End: 2020-03-06 | Stop reason: HOSPADM

## 2019-10-16 ENCOUNTER — INFUSION (OUTPATIENT)
Dept: INFUSION THERAPY | Facility: HOSPITAL | Age: 69
End: 2019-10-16
Attending: INTERNAL MEDICINE
Payer: MEDICARE

## 2019-10-16 VITALS
BODY MASS INDEX: 22.6 KG/M2 | HEART RATE: 76 BPM | TEMPERATURE: 99 F | SYSTOLIC BLOOD PRESSURE: 125 MMHG | DIASTOLIC BLOOD PRESSURE: 72 MMHG | RESPIRATION RATE: 18 BRPM | WEIGHT: 144.31 LBS

## 2019-10-16 DIAGNOSIS — D63.1 ANEMIA DUE TO STAGE 5 CHRONIC KIDNEY DISEASE TREATED WITH ERYTHROPOIETIN: Primary | ICD-10-CM

## 2019-10-16 DIAGNOSIS — N18.5 ANEMIA DUE TO STAGE 5 CHRONIC KIDNEY DISEASE TREATED WITH ERYTHROPOIETIN: Primary | ICD-10-CM

## 2019-10-16 DIAGNOSIS — D51.8 ANEMIA OF DECREASED VITAMIN B12 ABSORPTION: ICD-10-CM

## 2019-10-16 PROCEDURE — 96372 THER/PROPH/DIAG INJ SC/IM: CPT

## 2019-10-16 PROCEDURE — 63600175 PHARM REV CODE 636 W HCPCS: Mod: JG | Performed by: INTERNAL MEDICINE

## 2019-10-16 RX ORDER — CYANOCOBALAMIN 1000 UG/ML
1000 INJECTION, SOLUTION INTRAMUSCULAR; SUBCUTANEOUS
Status: CANCELLED | OUTPATIENT
Start: 2019-10-30

## 2019-10-16 RX ADMIN — EPOETIN ALFA 15000 UNITS: 20000 SOLUTION INTRAVENOUS; SUBCUTANEOUS at 09:10

## 2019-10-16 NOTE — PLAN OF CARE
Problem: Anemia  Goal: Anemia Symptom Improvement  Outcome: Ongoing, Progressing  Intervention: Monitor and Manage Anemia  Flowsheets (Taken 10/16/2019 0917)  Oral Nutrition Promotion: physical activity promoted; rest periods promoted  Fatigue Management: paced activity encouraged; frequent rest breaks encouraged

## 2019-10-17 ENCOUNTER — OFFICE VISIT (OUTPATIENT)
Dept: FAMILY MEDICINE | Facility: CLINIC | Age: 69
End: 2019-10-17
Payer: MEDICARE

## 2019-10-17 VITALS
TEMPERATURE: 98 F | DIASTOLIC BLOOD PRESSURE: 62 MMHG | HEART RATE: 74 BPM | OXYGEN SATURATION: 98 % | RESPIRATION RATE: 16 BRPM | SYSTOLIC BLOOD PRESSURE: 128 MMHG | WEIGHT: 145 LBS | BODY MASS INDEX: 22.76 KG/M2 | HEIGHT: 67 IN

## 2019-10-17 DIAGNOSIS — E11.40 CONTROLLED TYPE 2 DIABETES MELLITUS WITH NEUROPATHY: Primary | ICD-10-CM

## 2019-10-17 DIAGNOSIS — M79.602 ARM PAIN, DIFFUSE, LEFT: ICD-10-CM

## 2019-10-17 DIAGNOSIS — R10.13 EPIGASTRIC PAIN: ICD-10-CM

## 2019-10-17 DIAGNOSIS — D50.0 IRON DEFICIENCY ANEMIA DUE TO CHRONIC BLOOD LOSS: Chronic | ICD-10-CM

## 2019-10-17 DIAGNOSIS — N18.5 ANEMIA DUE TO STAGE 5 CHRONIC KIDNEY DISEASE TREATED WITH ERYTHROPOIETIN: ICD-10-CM

## 2019-10-17 DIAGNOSIS — D63.1 ANEMIA DUE TO STAGE 5 CHRONIC KIDNEY DISEASE TREATED WITH ERYTHROPOIETIN: ICD-10-CM

## 2019-10-17 DIAGNOSIS — D51.8 ANEMIA OF DECREASED VITAMIN B12 ABSORPTION: ICD-10-CM

## 2019-10-17 DIAGNOSIS — M25.50 ARTHRALGIA, UNSPECIFIED JOINT: ICD-10-CM

## 2019-10-17 PROCEDURE — 1101F PR PT FALLS ASSESS DOC 0-1 FALLS W/OUT INJ PAST YR: ICD-10-PCS | Mod: S$GLB,,, | Performed by: INTERNAL MEDICINE

## 2019-10-17 PROCEDURE — 99213 OFFICE O/P EST LOW 20 MIN: CPT | Mod: S$GLB,,, | Performed by: INTERNAL MEDICINE

## 2019-10-17 PROCEDURE — 3078F DIAST BP <80 MM HG: CPT | Mod: S$GLB,,, | Performed by: INTERNAL MEDICINE

## 2019-10-17 PROCEDURE — 3044F HG A1C LEVEL LT 7.0%: CPT | Mod: S$GLB,,, | Performed by: INTERNAL MEDICINE

## 2019-10-17 PROCEDURE — 1101F PT FALLS ASSESS-DOCD LE1/YR: CPT | Mod: S$GLB,,, | Performed by: INTERNAL MEDICINE

## 2019-10-17 PROCEDURE — 3044F PR MOST RECENT HEMOGLOBIN A1C LEVEL <7.0%: ICD-10-PCS | Mod: S$GLB,,, | Performed by: INTERNAL MEDICINE

## 2019-10-17 PROCEDURE — 3074F PR MOST RECENT SYSTOLIC BLOOD PRESSURE < 130 MM HG: ICD-10-PCS | Mod: S$GLB,,, | Performed by: INTERNAL MEDICINE

## 2019-10-17 PROCEDURE — 99213 PR OFFICE/OUTPT VISIT, EST, LEVL III, 20-29 MIN: ICD-10-PCS | Mod: S$GLB,,, | Performed by: INTERNAL MEDICINE

## 2019-10-17 PROCEDURE — 3078F PR MOST RECENT DIASTOLIC BLOOD PRESSURE < 80 MM HG: ICD-10-PCS | Mod: S$GLB,,, | Performed by: INTERNAL MEDICINE

## 2019-10-17 PROCEDURE — 3074F SYST BP LT 130 MM HG: CPT | Mod: S$GLB,,, | Performed by: INTERNAL MEDICINE

## 2019-10-17 RX ORDER — TRAMADOL HYDROCHLORIDE 50 MG/1
50 TABLET ORAL EVERY 4 HOURS PRN
Qty: 42 TABLET | Refills: 0 | Status: SHIPPED | OUTPATIENT
Start: 2019-10-17 | End: 2020-01-30 | Stop reason: SDUPTHER

## 2019-10-17 NOTE — PROGRESS NOTES
SUBJECTIVE:    Patient ID: Shara Hutchins is a 69 y.o. female.    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    Inflammatory Bowel Disease-continues with diarrhea not responsive to any agents    Lab Visit on 10/14/2019   Component Date Value Ref Range Status    WBC 10/14/2019 4.91  3.90 - 12.70 K/uL Final    RBC 10/14/2019 2.76* 4.00 - 5.40 M/uL Final    Hemoglobin 10/14/2019 8.9* 12.0 - 16.0 g/dL Final    Hematocrit 10/14/2019 28.0* 37.0 - 48.5 % Final    Mean Corpuscular Volume 10/14/2019 101* 82 - 98 fL Final    Mean Corpuscular Hemoglobin 10/14/2019 32.2* 27.0 - 31.0 pg Final    Mean Corpuscular Hemoglobin Conc 10/14/2019 31.8* 32.0 - 36.0 g/dL Final    RDW 10/14/2019 14.7* 11.5 - 14.5 % Final    Platelets 10/14/2019 164  150 - 350 K/uL Final    MPV 10/14/2019 12.0  9.2 - 12.9 fL Final    Immature Granulocytes 10/14/2019 0.2  0.0 - 0.5 % Final    Gran # (ANC) 10/14/2019 3.0  1.8 - 7.7 K/uL Final    Immature Grans (Abs) 10/14/2019 0.01  0.00 - 0.04 K/uL Final    Lymph # 10/14/2019 1.4  1.0 - 4.8 K/uL Final    Mono # 10/14/2019 0.3  0.3 - 1.0 K/uL Final    Eos # 10/14/2019 0.2  0.0 - 0.5 K/uL Final    Baso # 10/14/2019 0.02  0.00 - 0.20 K/uL Final    nRBC 10/14/2019 0  0 /100 WBC Final    Gran% 10/14/2019 60.3  38.0 - 73.0 % Final    Lymph% 10/14/2019 29.1  18.0 - 48.0 % Final    Mono% 10/14/2019 6.1  4.0 - 15.0 % Final    Eosinophil% 10/14/2019 3.9  0.0 - 8.0 % Final    Basophil% 10/14/2019 0.4  0.0 - 1.9 % Final    Differential Method 10/14/2019 Automated   Final   Lab Visit on 10/10/2019   Component Date Value Ref Range Status    Creatinine 10/10/2019 2.6* 0.5 - 1.4 mg/dL Final   Lab Visit on 09/30/2019   Component  Date Value Ref Range Status    Sodium 09/30/2019 142  136 - 145 mmol/L Final    Potassium 09/30/2019 3.4* 3.5 - 5.1 mmol/L Final    Chloride 09/30/2019 118* 95 - 110 mmol/L Final    CO2 09/30/2019 18* 23 - 29 mmol/L Final    Glucose 09/30/2019 94  70 - 110 mg/dL Final    BUN, Bld 09/30/2019 30* 8 - 23 mg/dL Final    Creatinine 09/30/2019 2.6* 0.5 - 1.4 mg/dL Final    Calcium 09/30/2019 8.8  8.7 - 10.5 mg/dL Final    Total Protein 09/30/2019 6.1  6.0 - 8.4 g/dL Final    Albumin 09/30/2019 3.8  3.5 - 5.2 g/dL Final    Total Bilirubin 09/30/2019 0.7  0.1 - 1.0 mg/dL Final    Alkaline Phosphatase 09/30/2019 152* 55 - 135 U/L Final    AST 09/30/2019 56* 10 - 40 U/L Final    ALT 09/30/2019 45* 10 - 44 U/L Final    Anion Gap 09/30/2019 6* 8 - 16 mmol/L Final    eGFR if  09/30/2019 20.9* >60 mL/min/1.73 m^2 Final    eGFR if non African American 09/30/2019 18.2* >60 mL/min/1.73 m^2 Final    WBC 09/30/2019 5.75  3.90 - 12.70 K/uL Final    RBC 09/30/2019 3.32* 4.00 - 5.40 M/uL Final    Hemoglobin 09/30/2019 10.7* 12.0 - 16.0 g/dL Final    Hematocrit 09/30/2019 33.9* 37.0 - 48.5 % Final    Mean Corpuscular Volume 09/30/2019 102* 82 - 98 fL Final    Mean Corpuscular Hemoglobin 09/30/2019 32.2* 27.0 - 31.0 pg Final    Mean Corpuscular Hemoglobin Conc 09/30/2019 31.6* 32.0 - 36.0 g/dL Final    RDW 09/30/2019 16.0* 11.5 - 14.5 % Final    Platelets 09/30/2019 188  150 - 350 K/uL Final    MPV 09/30/2019 11.3  9.2 - 12.9 fL Final    Immature Granulocytes 09/30/2019 0.3  0.0 - 0.5 % Final    Gran # (ANC) 09/30/2019 3.5  1.8 - 7.7 K/uL Final    Immature Grans (Abs) 09/30/2019 0.02  0.00 - 0.04 K/uL Final    Lymph # 09/30/2019 1.6  1.0 - 4.8 K/uL Final    Mono # 09/30/2019 0.4  0.3 - 1.0 K/uL Final    Eos # 09/30/2019 0.2  0.0 - 0.5 K/uL Final    Baso # 09/30/2019 0.04  0.00 - 0.20 K/uL Final    nRBC 09/30/2019 0  0 /100 WBC Final    Gran% 09/30/2019 60.2  38.0 - 73.0 % Final    Lymph%  09/30/2019 28.5  18.0 - 48.0 % Final    Mono% 09/30/2019 7.0  4.0 - 15.0 % Final    Eosinophil% 09/30/2019 3.3  0.0 - 8.0 % Final    Basophil% 09/30/2019 0.7  0.0 - 1.9 % Final    Differential Method 09/30/2019 Automated   Final    PTH, Intact 09/30/2019 176.4* 9.0 - 77.0 pg/mL Final    Iron 09/30/2019 90  30 - 160 ug/dL Final    Transferrin 09/30/2019 164* 200 - 375 mg/dL Final    TIBC 09/30/2019 230* 250 - 450 ug/dL Final    Saturated Iron 09/30/2019 39  20 - 50 % Final   Lab Visit on 09/16/2019   Component Date Value Ref Range Status    WBC 09/16/2019 5.16  3.90 - 12.70 K/uL Final    RBC 09/16/2019 3.10* 4.00 - 5.40 M/uL Final    Hemoglobin 09/16/2019 10.0* 12.0 - 16.0 g/dL Final    Hematocrit 09/16/2019 30.8* 37.0 - 48.5 % Final    Mean Corpuscular Volume 09/16/2019 99* 82 - 98 fL Final    Mean Corpuscular Hemoglobin 09/16/2019 32.3* 27.0 - 31.0 pg Final    Mean Corpuscular Hemoglobin Conc 09/16/2019 32.5  32.0 - 36.0 g/dL Final    RDW 09/16/2019 15.7* 11.5 - 14.5 % Final    Platelets 09/16/2019 168  150 - 350 K/uL Final    MPV 09/16/2019 10.6  9.2 - 12.9 fL Final    Immature Granulocytes 09/16/2019 0.4  0.0 - 0.5 % Final    Gran # (ANC) 09/16/2019 3.1  1.8 - 7.7 K/uL Final    Immature Grans (Abs) 09/16/2019 0.02  0.00 - 0.04 K/uL Final    Lymph # 09/16/2019 1.5  1.0 - 4.8 K/uL Final    Mono # 09/16/2019 0.4  0.3 - 1.0 K/uL Final    Eos # 09/16/2019 0.2  0.0 - 0.5 K/uL Final    Baso # 09/16/2019 0.02  0.00 - 0.20 K/uL Final    nRBC 09/16/2019 0  0 /100 WBC Final    Gran% 09/16/2019 60.6  38.0 - 73.0 % Final    Lymph% 09/16/2019 28.3  18.0 - 48.0 % Final    Mono% 09/16/2019 7.4  4.0 - 15.0 % Final    Eosinophil% 09/16/2019 2.9  0.0 - 8.0 % Final    Basophil% 09/16/2019 0.4  0.0 - 1.9 % Final    Differential Method 09/16/2019 Automated   Final   Lab Visit on 09/03/2019   Component Date Value Ref Range Status    WBC 09/03/2019 6.07  3.90 - 12.70 K/uL Final    RBC 09/03/2019 2.99*  4.00 - 5.40 M/uL Final    Hemoglobin 09/03/2019 9.6* 12.0 - 16.0 g/dL Final    Hematocrit 09/03/2019 29.6* 37.0 - 48.5 % Final    Mean Corpuscular Volume 09/03/2019 99* 82 - 98 fL Final    Mean Corpuscular Hemoglobin 09/03/2019 32.1* 27.0 - 31.0 pg Final    Mean Corpuscular Hemoglobin Conc 09/03/2019 32.4  32.0 - 36.0 g/dL Final    RDW 09/03/2019 14.3  11.5 - 14.5 % Final    Platelets 09/03/2019 171  150 - 350 K/uL Final    MPV 09/03/2019 11.0  9.2 - 12.9 fL Final    Immature Granulocytes 09/03/2019 0.3  0.0 - 0.5 % Final    Gran # (ANC) 09/03/2019 4.1  1.8 - 7.7 K/uL Final    Immature Grans (Abs) 09/03/2019 0.02  0.00 - 0.04 K/uL Final    Lymph # 09/03/2019 1.4  1.0 - 4.8 K/uL Final    Mono # 09/03/2019 0.4  0.3 - 1.0 K/uL Final    Eos # 09/03/2019 0.1  0.0 - 0.5 K/uL Final    Baso # 09/03/2019 0.02  0.00 - 0.20 K/uL Final    nRBC 09/03/2019 0  0 /100 WBC Final    Gran% 09/03/2019 68.1  38.0 - 73.0 % Final    Lymph% 09/03/2019 23.1  18.0 - 48.0 % Final    Mono% 09/03/2019 5.9  4.0 - 15.0 % Final    Eosinophil% 09/03/2019 2.3  0.0 - 8.0 % Final    Basophil% 09/03/2019 0.3  0.0 - 1.9 % Final    Differential Method 09/03/2019 Automated   Final   Lab Visit on 08/19/2019   Component Date Value Ref Range Status    WBC 08/19/2019 4.95  3.90 - 12.70 K/uL Final    RBC 08/19/2019 3.14* 4.00 - 5.40 M/uL Final    Hemoglobin 08/19/2019 10.1* 12.0 - 16.0 g/dL Final    Hematocrit 08/19/2019 31.0* 37.0 - 48.5 % Final    Mean Corpuscular Volume 08/19/2019 99* 82 - 98 fL Final    Mean Corpuscular Hemoglobin 08/19/2019 32.2* 27.0 - 31.0 pg Final    Mean Corpuscular Hemoglobin Conc 08/19/2019 32.6  32.0 - 36.0 g/dL Final    RDW 08/19/2019 14.0  11.5 - 14.5 % Final    Platelets 08/19/2019 198  150 - 350 K/uL Final    MPV 08/19/2019 10.9  9.2 - 12.9 fL Final    Immature Granulocytes 08/19/2019 0.4  0.0 - 0.5 % Final    Gran # (ANC) 08/19/2019 3.0  1.8 - 7.7 K/uL Final    Immature Grans (Abs) 08/19/2019  0.02  0.00 - 0.04 K/uL Final    Lymph # 08/19/2019 1.3  1.0 - 4.8 K/uL Final    Mono # 08/19/2019 0.4  0.3 - 1.0 K/uL Final    Eos # 08/19/2019 0.2  0.0 - 0.5 K/uL Final    Baso # 08/19/2019 0.03  0.00 - 0.20 K/uL Final    nRBC 08/19/2019 0  0 /100 WBC Final    Gran% 08/19/2019 61.4  38.0 - 73.0 % Final    Lymph% 08/19/2019 26.5  18.0 - 48.0 % Final    Mono% 08/19/2019 7.9  4.0 - 15.0 % Final    Eosinophil% 08/19/2019 3.2  0.0 - 8.0 % Final    Basophil% 08/19/2019 0.6  0.0 - 1.9 % Final    Differential Method 08/19/2019 Automated   Final   Lab Visit on 08/05/2019   Component Date Value Ref Range Status    WBC 08/05/2019 5.54  3.90 - 12.70 K/uL Final    RBC 08/05/2019 2.76* 4.00 - 5.40 M/uL Final    Hemoglobin 08/05/2019 8.7* 12.0 - 16.0 g/dL Final    Hematocrit 08/05/2019 28.6* 37.0 - 48.5 % Final    Mean Corpuscular Volume 08/05/2019 104* 82 - 98 fL Final    Mean Corpuscular Hemoglobin 08/05/2019 31.5* 27.0 - 31.0 pg Final    Mean Corpuscular Hemoglobin Conc 08/05/2019 30.4* 32.0 - 36.0 g/dL Final    RDW 08/05/2019 14.2  11.5 - 14.5 % Final    Platelets 08/05/2019 260  150 - 350 K/uL Final    MPV 08/05/2019 9.7  9.2 - 12.9 fL Final    Immature Granulocytes 08/05/2019 0.5  0.0 - 0.5 % Final    Gran # (ANC) 08/05/2019 3.6  1.8 - 7.7 K/uL Final    Immature Grans (Abs) 08/05/2019 0.03  0.00 - 0.04 K/uL Final    Lymph # 08/05/2019 1.3  1.0 - 4.8 K/uL Final    Mono # 08/05/2019 0.4  0.3 - 1.0 K/uL Final    Eos # 08/05/2019 0.1  0.0 - 0.5 K/uL Final    Baso # 08/05/2019 0.06  0.00 - 0.20 K/uL Final    nRBC 08/05/2019 0  0 /100 WBC Final    Gran% 08/05/2019 65.7  38.0 - 73.0 % Final    Lymph% 08/05/2019 24.2  18.0 - 48.0 % Final    Mono% 08/05/2019 6.7  4.0 - 15.0 % Final    Eosinophil% 08/05/2019 1.8  0.0 - 8.0 % Final    Basophil% 08/05/2019 1.1  0.0 - 1.9 % Final    Differential Method 08/05/2019 Automated   Final   Lab Visit on 07/30/2019   Component Date Value Ref Range Status     WBC 07/30/2019 11.18  3.90 - 12.70 K/uL Final    RBC 07/30/2019 2.81* 4.00 - 5.40 M/uL Final    Hemoglobin 07/30/2019 9.0* 12.0 - 16.0 g/dL Final    Hematocrit 07/30/2019 29.4* 37.0 - 48.5 % Final    Mean Corpuscular Volume 07/30/2019 105* 82 - 98 fL Final    Mean Corpuscular Hemoglobin 07/30/2019 32.0* 27.0 - 31.0 pg Final    Mean Corpuscular Hemoglobin Conc 07/30/2019 30.6* 32.0 - 36.0 g/dL Final    RDW 07/30/2019 14.7* 11.5 - 14.5 % Final    Platelets 07/30/2019 395* 150 - 350 K/uL Final    MPV 07/30/2019 10.5  9.2 - 12.9 fL Final    Immature Granulocytes 07/30/2019 1.3* 0.0 - 0.5 % Final    Gran # (ANC) 07/30/2019 8.0* 1.8 - 7.7 K/uL Final    Immature Grans (Abs) 07/30/2019 0.15* 0.00 - 0.04 K/uL Final    Lymph # 07/30/2019 2.2  1.0 - 4.8 K/uL Final    Mono # 07/30/2019 0.6  0.3 - 1.0 K/uL Final    Eos # 07/30/2019 0.2  0.0 - 0.5 K/uL Final    Baso # 07/30/2019 0.05  0.00 - 0.20 K/uL Final    nRBC 07/30/2019 0  0 /100 WBC Final    Gran% 07/30/2019 71.6  38.0 - 73.0 % Final    Lymph% 07/30/2019 19.4  18.0 - 48.0 % Final    Mono% 07/30/2019 5.5  4.0 - 15.0 % Final    Eosinophil% 07/30/2019 1.8  0.0 - 8.0 % Final    Basophil% 07/30/2019 0.4  0.0 - 1.9 % Final    Differential Method 07/30/2019 Automated   Final    Sodium 07/30/2019 141  136 - 145 mmol/L Final    Potassium 07/30/2019 4.9  3.5 - 5.1 mmol/L Final    Chloride 07/30/2019 110  95 - 110 mmol/L Final    CO2 07/30/2019 24  23 - 29 mmol/L Final    Glucose 07/30/2019 85  70 - 110 mg/dL Final    BUN, Bld 07/30/2019 28* 8 - 23 mg/dL Final    Creatinine 07/30/2019 2.5* 0.5 - 1.4 mg/dL Final    Calcium 07/30/2019 9.4  8.7 - 10.5 mg/dL Final    Total Protein 07/30/2019 6.5  6.0 - 8.4 g/dL Final    Albumin 07/30/2019 3.4* 3.5 - 5.2 g/dL Final    Total Bilirubin 07/30/2019 0.4  0.1 - 1.0 mg/dL Final    Alkaline Phosphatase 07/30/2019 115  55 - 135 U/L Final    AST 07/30/2019 29  10 - 40 U/L Final    ALT 07/30/2019 15  10 - 44 U/L  Final    Anion Gap 07/30/2019 7* 8 - 16 mmol/L Final    eGFR if  07/30/2019 21.9* >60 mL/min/1.73 m^2 Final    eGFR if non African American 07/30/2019 19.0* >60 mL/min/1.73 m^2 Final    Phosphorus 07/30/2019 4.2  2.7 - 4.5 mg/dL Final    Iron 07/30/2019 86  30 - 160 ug/dL Final    Transferrin 07/30/2019 177* 200 - 375 mg/dL Final    TIBC 07/30/2019 248* 250 - 450 ug/dL Final    Saturated Iron 07/30/2019 35  20 - 50 % Final    Ferritin 07/30/2019 877* 20.0 - 300.0 ng/mL Final    Protein, Urine Random 07/30/2019 57* 0 - 15 mg/dL Final    Creatinine, Random Ur 07/30/2019 135.0  15.0 - 325.0 mg/dL Final   Office Visit on 07/10/2019   Component Date Value Ref Range Status    Glucose 07/11/2019 87  70 - 99 mg/dL Final    BUN, Bld 07/11/2019 25* 8 - 20 mg/dL Final    Creatinine 07/11/2019 3.58* 0.60 - 1.40 mg/dL Final    Calcium 07/11/2019 8.9  7.7 - 10.4 mg/dL Final    Sodium 07/11/2019 145* 134 - 144 mmol/L Final    Potassium 07/11/2019 2.8* 3.5 - 5.0 mmol/L Final    Chloride 07/11/2019 115* 98 - 110 mmol/L Final    CO2 07/11/2019 19.9* 22.8 - 31.6 mmol/L Final    Albumin 07/11/2019 4.0  3.1 - 4.7 g/dL Final    Total Bilirubin 07/11/2019 0.5  0.3 - 1.0 mg/dL Final    Alkaline Phosphatase 07/11/2019 166* 40 - 104 IU/L Final    Total Protein 07/11/2019 6.5  6.0 - 8.2 g/dL Final    ALT (SGPT) 07/11/2019 21  3 - 33 IU/L Final    AST 07/11/2019 32  10 - 40 IU/L Final    Magnesium 07/11/2019 1.8  1.5 - 2.6 mg/dL Final    Hemoglobin A1C 07/11/2019 5.1  3.1 - 6.5 % Final       Past Medical History:   Diagnosis Date    Chronic kidney disease, stage III (moderate) 1/7/2018    Diabetes mellitus, type 2     Fatigue     Hypertension     Iron deficiency anemia due to chronic blood loss 1/7/2018    Vulvar cancer 1/7/2019     Past Surgical History:   Procedure Laterality Date    ABDOMINAL SURGERY      BACK SURGERY      CERVICAL FUSION      x 4    cervix repair      CHOLECYSTECTOMY   2000    HYSTERECTOMY  1980    LUMBAR LAMINECTOMY      NECK SURGERY      SHOULDER SURGERY      vulva cancer       Family History   Problem Relation Age of Onset    Heart disease Mother     Hypertension Mother     Cancer Mother         lung    Heart disease Father     Heart disease Sister         Open heart surgery    No Known Problems Daughter        Marital Status:   Alcohol History:  reports that she does not drink alcohol.  Tobacco History:  reports that she quit smoking about 19 years ago. Her smoking use included cigarettes. She has a 33.00 pack-year smoking history. She has never used smokeless tobacco.  Drug History:  reports that she does not use drugs.    Review of patient's allergies indicates:   Allergen Reactions    Ciprofloxacin Other (See Comments)     Elevated liver enzymes      Pcn [penicillins] Anaphylaxis       Current Outpatient Medications:     calcitRIOL (ROCALTROL) 0.5 MCG Cap, Take 1 capsule by mouth once daily., Disp: , Rfl: 3    CARTIA  mg Cp24, TAKE 1 CAPSULE DAILY, Disp: 90 capsule, Rfl: 4    cyclobenzaprine (FLEXERIL) 10 MG tablet, Take 1 tablet (10 mg total) by mouth 2 (two) times daily., Disp: 180 tablet, Rfl: 1    dicyclomine (BENTYL) 10 MG capsule, TAKE 1 CAPSULE THREE TIMES A DAY, Disp: 270 capsule, Rfl: 4    ferrous sulfate 325 mg (65 mg iron) CpSR, Take 1 tablet by mouth once daily., Disp: 90 capsule, Rfl: 1    levothyroxine (SYNTHROID) 88 MCG tablet, TAKE 1 TABLET DAILY, Disp: 90 tablet, Rfl: 4    magnesium oxide (MAG-OX) 400 mg tablet, Take 1 tablet (400 mg total) by mouth once daily. (Patient taking differently: Take 400 mg by mouth 2 (two) times daily. ), Disp: 90 tablet, Rfl: 1    potassium chloride (KLOR-CON) 10 MEQ TbSR, Take 10 mEq by mouth 2 (two) times daily. , Disp: , Rfl:     promethazine (PHENERGAN) 25 MG tablet, Take 1 tablet (25 mg total) by mouth daily as needed for Nausea., Disp: 90 tablet, Rfl: 0    sodium bicarbonate 650 MG  tablet, Take 650 mg by mouth 3 (three) times daily. , Disp: , Rfl: 2    traMADol (ULTRAM) 50 mg tablet, Take 1 tablet (50 mg total) by mouth every 4 (four) hours as needed for Pain., Disp: 42 tablet, Rfl: 0  No current facility-administered medications for this visit.     Review of Systems   Constitutional: Negative for appetite change (drinking boost-high protein formula), chills, diaphoresis, fatigue (related to illness), fever and unexpected weight change (gradual weight loss).   HENT: Negative for congestion, ear pain, hearing loss, nosebleeds, postnasal drip, sinus pressure, sinus pain, sneezing, sore throat, tinnitus, trouble swallowing and voice change.    Eyes: Negative for photophobia, pain, itching and visual disturbance.   Respiratory: Negative for apnea, cough, chest tightness, shortness of breath, wheezing and stridor.    Cardiovascular: Negative for chest pain, palpitations and leg swelling.   Gastrointestinal: Negative for abdominal distention, abdominal pain (cramping related to diarrhea), blood in stool, constipation, diarrhea, nausea (nausea with vomiting 2-3 times a week) and vomiting.   Endocrine: Negative for cold intolerance, heat intolerance, polydipsia and polyuria.   Genitourinary: Negative for difficulty urinating, dyspareunia, dysuria, flank pain, frequency, hematuria, menstrual problem, pelvic pain, urgency, vaginal discharge and vaginal pain.        See HPI   Musculoskeletal: Negative for arthralgias, back pain, joint swelling, myalgias, neck pain and neck stiffness.        Arm pain  After left arm complication from clotted access port now post stents   Skin: Negative for pallor.        Pruritis-has shedding of skin-   Allergic/Immunologic: Negative for environmental allergies and food allergies.   Neurological: Positive for headaches (recently, ? from anemia). Negative for dizziness, tremors, speech difficulty, weakness, light-headedness (getting up) and numbness.   Hematological: Does  "not bruise/bleed easily.   Psychiatric/Behavioral: Negative for agitation, confusion, decreased concentration, sleep disturbance (recent ) and suicidal ideas. The patient is not nervous/anxious.           Objective:      Vitals:    10/17/19 0809   BP: 128/62   Pulse: 74   Resp: 16   Temp: 97.8 °F (36.6 °C)   SpO2: 98%   Weight: 65.8 kg (145 lb)   Height: 5' 7" (1.702 m)     Physical Exam   Constitutional: She is oriented to person, place, and time. She appears well-developed. She is cooperative. No distress.   Thin-NAD   HENT:   Head: Normocephalic and atraumatic.   Right Ear: Tympanic membrane normal.   Left Ear: Tympanic membrane normal.   Mouth/Throat: Uvula is midline and mucous membranes are normal.   Eyes: Pupils are equal, round, and reactive to light. Conjunctivae and EOM are normal. Right eye exhibits no discharge. Left eye exhibits no discharge. No scleral icterus. Right pupil is round and reactive. Left pupil is round and reactive.   Neck: Trachea normal and normal range of motion. Neck supple. No JVD present. Carotid bruit is not present. No thyromegaly present.   Cardiovascular: Normal rate, regular rhythm and intact distal pulses. Exam reveals no gallop and no friction rub.   Murmur (1/2 out of 6 systolic murmur at base without radiation) heard.  Pulmonary/Chest: Effort normal and breath sounds normal. No respiratory distress. She has no wheezes. She has no rales.   Abdominal: Soft. Bowel sounds are normal. She exhibits no distension and no mass. There is no tenderness. There is no guarding. No hernia.   Musculoskeletal: Normal range of motion. She exhibits no edema.   Neurological: She is alert and oriented to person, place, and time. She has normal strength.   Skin: Skin is warm and dry. Capillary refill takes less than 2 seconds. No lesion and no rash noted. No cyanosis. Nails show no clubbing.   Marked pallor--scattered purpuric lesions-significant contusion of left arm   Psychiatric: She has a " normal mood and affect. Her speech is normal and behavior is normal. Judgment and thought content normal.   Nursing note and vitals reviewed.        Assessment:       1. 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 Arm pain, diffuse, left    3. Arthralgia, unspecified joint    4. Anemia due to stage 5 chronic kidney disease treated with erythropoietin    5. Anemia of decreased vitamin B12 absorption    6. Iron deficiency anemia due to chronic blood loss    7. 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  Dispense: 42 tablet; Refill: 0    Arthralgia, unspecified joint  -     traMADol (ULTRAM) 50 mg tablet; Take 1 tablet (50 mg total) by mouth every 4 (four) hours as needed for Pain.  Dispense: 42 tablet; Refill: 0    Anemia due to stage 5 chronic kidney disease treated with erythropoietin    Anemia of decreased vitamin B12 absorption    Iron deficiency anemia due to chronic blood loss    Epigastric pain  -     traMADol (ULTRAM) 50 mg tablet; Take 1 tablet (50 mg total) by mouth every 4 (four) hours as needed for Pain.  Dispense: 42 tablet; Refill: 0      No follow-ups on file.        10/17/2019 April Horton M.D.

## 2019-10-28 ENCOUNTER — LAB VISIT (OUTPATIENT)
Dept: LAB | Facility: HOSPITAL | Age: 69
End: 2019-10-28
Attending: INTERNAL MEDICINE
Payer: MEDICARE

## 2019-10-28 DIAGNOSIS — D63.1 ANEMIA OF CHRONIC RENAL FAILURE: Primary | ICD-10-CM

## 2019-10-28 DIAGNOSIS — N18.6 END STAGE RENAL DISEASE: ICD-10-CM

## 2019-10-28 DIAGNOSIS — N18.9 ANEMIA OF CHRONIC RENAL FAILURE: Primary | ICD-10-CM

## 2019-10-28 LAB
BASOPHILS # BLD AUTO: 0.02 K/UL (ref 0–0.2)
BASOPHILS NFR BLD: 0.4 % (ref 0–1.9)
DIFFERENTIAL METHOD: ABNORMAL
EOSINOPHIL # BLD AUTO: 0.2 K/UL (ref 0–0.5)
EOSINOPHIL NFR BLD: 4.6 % (ref 0–8)
ERYTHROCYTE [DISTWIDTH] IN BLOOD BY AUTOMATED COUNT: 15.8 % (ref 11.5–14.5)
HCT VFR BLD AUTO: 28.1 % (ref 37–48.5)
HGB BLD-MCNC: 8.9 G/DL (ref 12–16)
IMM GRANULOCYTES # BLD AUTO: 0.01 K/UL (ref 0–0.04)
IMM GRANULOCYTES NFR BLD AUTO: 0.2 % (ref 0–0.5)
LYMPHOCYTES # BLD AUTO: 1 K/UL (ref 1–4.8)
LYMPHOCYTES NFR BLD: 20.8 % (ref 18–48)
MCH RBC QN AUTO: 32.2 PG (ref 27–31)
MCHC RBC AUTO-ENTMCNC: 31.7 G/DL (ref 32–36)
MCV RBC AUTO: 102 FL (ref 82–98)
MONOCYTES # BLD AUTO: 0.4 K/UL (ref 0.3–1)
MONOCYTES NFR BLD: 7.9 % (ref 4–15)
NEUTROPHILS # BLD AUTO: 3.3 K/UL (ref 1.8–7.7)
NEUTROPHILS NFR BLD: 66.1 % (ref 38–73)
NRBC BLD-RTO: 0 /100 WBC
PLATELET # BLD AUTO: 133 K/UL (ref 150–350)
PMV BLD AUTO: 10.5 FL (ref 9.2–12.9)
RBC # BLD AUTO: 2.76 M/UL (ref 4–5.4)
WBC # BLD AUTO: 4.95 K/UL (ref 3.9–12.7)

## 2019-10-28 PROCEDURE — 85025 COMPLETE CBC W/AUTO DIFF WBC: CPT

## 2019-10-30 ENCOUNTER — INFUSION (OUTPATIENT)
Dept: INFUSION THERAPY | Facility: HOSPITAL | Age: 69
End: 2019-10-30
Attending: INTERNAL MEDICINE
Payer: MEDICARE

## 2019-10-30 VITALS
BODY MASS INDEX: 22.44 KG/M2 | SYSTOLIC BLOOD PRESSURE: 143 MMHG | TEMPERATURE: 99 F | HEART RATE: 82 BPM | WEIGHT: 143.31 LBS | RESPIRATION RATE: 18 BRPM | DIASTOLIC BLOOD PRESSURE: 75 MMHG

## 2019-10-30 DIAGNOSIS — N18.5 ANEMIA DUE TO STAGE 5 CHRONIC KIDNEY DISEASE TREATED WITH ERYTHROPOIETIN: Primary | ICD-10-CM

## 2019-10-30 DIAGNOSIS — D63.1 ANEMIA DUE TO STAGE 5 CHRONIC KIDNEY DISEASE TREATED WITH ERYTHROPOIETIN: Primary | ICD-10-CM

## 2019-10-30 PROCEDURE — 63600175 PHARM REV CODE 636 W HCPCS: Mod: JG | Performed by: INTERNAL MEDICINE

## 2019-10-30 PROCEDURE — 96372 THER/PROPH/DIAG INJ SC/IM: CPT

## 2019-10-30 RX ORDER — CYANOCOBALAMIN 1000 UG/ML
1000 INJECTION, SOLUTION INTRAMUSCULAR; SUBCUTANEOUS
Status: CANCELLED | OUTPATIENT
Start: 2019-11-13

## 2019-10-30 RX ADMIN — EPOETIN ALFA 15000 UNITS: 20000 SOLUTION INTRAVENOUS; SUBCUTANEOUS at 09:10

## 2019-10-30 NOTE — PLAN OF CARE
Problem: Anemia  Goal: Anemia Symptom Improvement  Outcome: Ongoing, Progressing  Intervention: Monitor and Manage Anemia  Flowsheets (Taken 10/30/2019 0938)  Fatigue Management: paced activity encouraged;frequent rest breaks encouraged

## 2019-10-31 LAB
CREAT CL/1.73 SQ M 12H UR+SERPL-ARVRAT: ABNORMAL ML/MIN
CREAT SERPL-MCNC: 2.6 MG/DL (ref 0.5–1.4)
CREAT UR-MCNC: ABNORMAL MG/DL
CREATININE, URINE (MG/SPEC): ABNORMAL
URINE COLLECTION DURATION: ABNORMAL HR
URINE VOLUME: ABNORMAL

## 2019-11-11 ENCOUNTER — LAB VISIT (OUTPATIENT)
Dept: LAB | Facility: HOSPITAL | Age: 69
End: 2019-11-11
Attending: INTERNAL MEDICINE
Payer: MEDICARE

## 2019-11-11 DIAGNOSIS — N18.9 ANEMIA OF CHRONIC RENAL FAILURE: Primary | ICD-10-CM

## 2019-11-11 DIAGNOSIS — D63.1 ANEMIA OF CHRONIC RENAL FAILURE: Primary | ICD-10-CM

## 2019-11-11 DIAGNOSIS — N18.30 CHRONIC KIDNEY DISEASE, STAGE III (MODERATE): ICD-10-CM

## 2019-11-11 LAB
BASOPHILS # BLD AUTO: 0.03 K/UL (ref 0–0.2)
BASOPHILS NFR BLD: 0.5 % (ref 0–1.9)
DIFFERENTIAL METHOD: ABNORMAL
EOSINOPHIL # BLD AUTO: 0.3 K/UL (ref 0–0.5)
EOSINOPHIL NFR BLD: 4.1 % (ref 0–8)
ERYTHROCYTE [DISTWIDTH] IN BLOOD BY AUTOMATED COUNT: 15.1 % (ref 11.5–14.5)
HCT VFR BLD AUTO: 30.8 % (ref 37–48.5)
HGB BLD-MCNC: 9.8 G/DL (ref 12–16)
IMM GRANULOCYTES # BLD AUTO: 0.02 K/UL (ref 0–0.04)
IMM GRANULOCYTES NFR BLD AUTO: 0.3 % (ref 0–0.5)
LYMPHOCYTES # BLD AUTO: 1.8 K/UL (ref 1–4.8)
LYMPHOCYTES NFR BLD: 26.6 % (ref 18–48)
MCH RBC QN AUTO: 32.8 PG (ref 27–31)
MCHC RBC AUTO-ENTMCNC: 31.8 G/DL (ref 32–36)
MCV RBC AUTO: 103 FL (ref 82–98)
MONOCYTES # BLD AUTO: 0.5 K/UL (ref 0.3–1)
MONOCYTES NFR BLD: 6.8 % (ref 4–15)
NEUTROPHILS # BLD AUTO: 4.1 K/UL (ref 1.8–7.7)
NEUTROPHILS NFR BLD: 61.7 % (ref 38–73)
NRBC BLD-RTO: 0 /100 WBC
PLATELET # BLD AUTO: 155 K/UL (ref 150–350)
PMV BLD AUTO: 11.1 FL (ref 9.2–12.9)
RBC # BLD AUTO: 2.99 M/UL (ref 4–5.4)
WBC # BLD AUTO: 6.57 K/UL (ref 3.9–12.7)

## 2019-11-11 PROCEDURE — 36415 COLL VENOUS BLD VENIPUNCTURE: CPT

## 2019-11-11 PROCEDURE — 85025 COMPLETE CBC W/AUTO DIFF WBC: CPT

## 2019-11-15 ENCOUNTER — LAB VISIT (OUTPATIENT)
Dept: LAB | Facility: HOSPITAL | Age: 69
End: 2019-11-15
Attending: INTERNAL MEDICINE
Payer: MEDICARE

## 2019-11-15 DIAGNOSIS — N18.5 CHRONIC KIDNEY DISEASE, STAGE V: ICD-10-CM

## 2019-11-15 DIAGNOSIS — N18.6 TYPE 2 DIABETES MELLITUS WITH END-STAGE RENAL DISEASE: ICD-10-CM

## 2019-11-15 DIAGNOSIS — E11.40 CONTROLLED TYPE 2 DIABETES MELLITUS WITH NEUROPATHY: ICD-10-CM

## 2019-11-15 DIAGNOSIS — E87.20 ACIDOSIS: ICD-10-CM

## 2019-11-15 DIAGNOSIS — I51.9 MYXEDEMA HEART DISEASE: ICD-10-CM

## 2019-11-15 DIAGNOSIS — E03.9 MYXEDEMA HEART DISEASE: ICD-10-CM

## 2019-11-15 DIAGNOSIS — E11.22 TYPE 2 DIABETES MELLITUS WITH END-STAGE RENAL DISEASE: ICD-10-CM

## 2019-11-15 DIAGNOSIS — I12.9 PARENCHYMAL RENAL HYPERTENSION: ICD-10-CM

## 2019-11-15 DIAGNOSIS — R94.4 NONSPECIFIC ABNORMAL RESULTS OF KIDNEY FUNCTION STUDY: Primary | ICD-10-CM

## 2019-11-15 LAB
ALBUMIN SERPL BCP-MCNC: 3.9 G/DL (ref 3.5–5.2)
ALP SERPL-CCNC: 126 U/L (ref 55–135)
ALT SERPL W/O P-5'-P-CCNC: 29 U/L (ref 10–44)
ANION GAP SERPL CALC-SCNC: 8 MMOL/L (ref 8–16)
AST SERPL-CCNC: 30 U/L (ref 10–40)
BASOPHILS # BLD AUTO: 0.03 K/UL (ref 0–0.2)
BASOPHILS NFR BLD: 0.5 % (ref 0–1.9)
BILIRUB SERPL-MCNC: 0.8 MG/DL (ref 0.1–1)
BUN SERPL-MCNC: 27 MG/DL (ref 8–23)
CALCIUM SERPL-MCNC: 8.8 MG/DL (ref 8.7–10.5)
CHLORIDE SERPL-SCNC: 114 MMOL/L (ref 95–110)
CO2 SERPL-SCNC: 20 MMOL/L (ref 23–29)
CREAT SERPL-MCNC: 3 MG/DL (ref 0.5–1.4)
DIFFERENTIAL METHOD: ABNORMAL
EOSINOPHIL # BLD AUTO: 0.3 K/UL (ref 0–0.5)
EOSINOPHIL NFR BLD: 4.2 % (ref 0–8)
ERYTHROCYTE [DISTWIDTH] IN BLOOD BY AUTOMATED COUNT: 14.7 % (ref 11.5–14.5)
EST. GFR  (AFRICAN AMERICAN): 17.6 ML/MIN/1.73 M^2
EST. GFR  (NON AFRICAN AMERICAN): 15.3 ML/MIN/1.73 M^2
GLUCOSE SERPL-MCNC: 81 MG/DL (ref 70–110)
HCT VFR BLD AUTO: 30.8 % (ref 37–48.5)
HGB BLD-MCNC: 9.9 G/DL (ref 12–16)
IMM GRANULOCYTES # BLD AUTO: 0.01 K/UL (ref 0–0.04)
IMM GRANULOCYTES NFR BLD AUTO: 0.2 % (ref 0–0.5)
LYMPHOCYTES # BLD AUTO: 2.1 K/UL (ref 1–4.8)
LYMPHOCYTES NFR BLD: 34.5 % (ref 18–48)
MCH RBC QN AUTO: 32.8 PG (ref 27–31)
MCHC RBC AUTO-ENTMCNC: 32.1 G/DL (ref 32–36)
MCV RBC AUTO: 102 FL (ref 82–98)
MONOCYTES # BLD AUTO: 0.4 K/UL (ref 0.3–1)
MONOCYTES NFR BLD: 6.8 % (ref 4–15)
NEUTROPHILS # BLD AUTO: 3.3 K/UL (ref 1.8–7.7)
NEUTROPHILS NFR BLD: 53.8 % (ref 38–73)
NRBC BLD-RTO: 0 /100 WBC
PHOSPHATE SERPL-MCNC: 4.7 MG/DL (ref 2.7–4.5)
PLATELET # BLD AUTO: 180 K/UL (ref 150–350)
PMV BLD AUTO: 11.2 FL (ref 9.2–12.9)
POTASSIUM SERPL-SCNC: 4.4 MMOL/L (ref 3.5–5.1)
PROT SERPL-MCNC: 6 G/DL (ref 6–8.4)
PTH-INTACT SERPL-MCNC: 238.4 PG/ML (ref 9–77)
RBC # BLD AUTO: 3.02 M/UL (ref 4–5.4)
SODIUM SERPL-SCNC: 142 MMOL/L (ref 136–145)
WBC # BLD AUTO: 6.14 K/UL (ref 3.9–12.7)

## 2019-11-15 PROCEDURE — 83970 ASSAY OF PARATHORMONE: CPT

## 2019-11-15 PROCEDURE — 83036 HEMOGLOBIN GLYCOSYLATED A1C: CPT

## 2019-11-15 PROCEDURE — 36415 COLL VENOUS BLD VENIPUNCTURE: CPT

## 2019-11-15 PROCEDURE — 85025 COMPLETE CBC W/AUTO DIFF WBC: CPT

## 2019-11-15 PROCEDURE — 30000890 LABCORP MISCELLANEOUS TEST

## 2019-11-15 PROCEDURE — 84100 ASSAY OF PHOSPHORUS: CPT

## 2019-11-15 PROCEDURE — 80053 COMPREHEN METABOLIC PANEL: CPT

## 2019-11-17 LAB — A1C: 4.4

## 2019-11-20 LAB
LABCORP MISC TEST CODE: 1453
LABCORP MISC TEST NAME: NORMAL
LABCORP MISCELLANEOUS TEST: NORMAL

## 2019-11-21 ENCOUNTER — DOCUMENTATION ONLY (OUTPATIENT)
Dept: FAMILY MEDICINE | Facility: CLINIC | Age: 69
End: 2019-11-21

## 2019-11-21 ENCOUNTER — TELEPHONE (OUTPATIENT)
Dept: FAMILY MEDICINE | Facility: CLINIC | Age: 69
End: 2019-11-21

## 2019-11-21 DIAGNOSIS — E11.40 CONTROLLED TYPE 2 DIABETES MELLITUS WITH NEUROPATHY: Primary | ICD-10-CM

## 2019-11-21 NOTE — TELEPHONE ENCOUNTER
----- Message from April Horton MD sent at 11/17/2019  5:12 PM CST -----  Find out what happened to the labs and let pt know we have to repeat

## 2019-11-21 NOTE — TELEPHONE ENCOUNTER
Spoke with [facility]. The HA1C blood test was invalid due to the level was not readable on the instrument used by the lab.  They sent the sample off to [facility] to see if they can get a value.    Asked them what the cause of an invalid value could be, she said the value was probably too low for their instrument to read. The result is not back yet.    Do you want to wait and see if [facility] sends back a report? Or do you want to go ahead and repeat the lab test.    I pended the lab order below if you wish for pt to go repeat the test. May need to click encounter to open & see the order.

## 2019-11-24 LAB
ESTIMATED AVG GLUCOSE: ABNORMAL MG/DL (ref 68–131)
HBA1C MFR BLD HPLC: ABNORMAL % (ref 4.5–6.2)

## 2019-11-25 ENCOUNTER — TELEPHONE (OUTPATIENT)
Dept: FAMILY MEDICINE | Facility: CLINIC | Age: 69
End: 2019-11-25

## 2019-11-25 NOTE — TELEPHONE ENCOUNTER
Elise-as previously requested, these labs HAVE to be repeated-find out what happened to labs drawn and repeat them

## 2019-11-27 ENCOUNTER — INFUSION (OUTPATIENT)
Dept: INFUSION THERAPY | Facility: HOSPITAL | Age: 69
End: 2019-11-27
Attending: INTERNAL MEDICINE
Payer: MEDICARE

## 2019-11-27 VITALS
RESPIRATION RATE: 18 BRPM | DIASTOLIC BLOOD PRESSURE: 69 MMHG | WEIGHT: 140.63 LBS | BODY MASS INDEX: 22.03 KG/M2 | TEMPERATURE: 98 F | SYSTOLIC BLOOD PRESSURE: 124 MMHG | HEART RATE: 80 BPM

## 2019-11-27 DIAGNOSIS — D63.1 ANEMIA DUE TO STAGE 5 CHRONIC KIDNEY DISEASE TREATED WITH ERYTHROPOIETIN: Primary | ICD-10-CM

## 2019-11-27 DIAGNOSIS — N18.5 ANEMIA DUE TO STAGE 5 CHRONIC KIDNEY DISEASE TREATED WITH ERYTHROPOIETIN: Primary | ICD-10-CM

## 2019-11-27 DIAGNOSIS — D51.8 ANEMIA OF DECREASED VITAMIN B12 ABSORPTION: ICD-10-CM

## 2019-11-27 PROCEDURE — 96372 THER/PROPH/DIAG INJ SC/IM: CPT

## 2019-11-27 PROCEDURE — 63600175 PHARM REV CODE 636 W HCPCS: Mod: JG | Performed by: INTERNAL MEDICINE

## 2019-11-27 RX ORDER — CYANOCOBALAMIN 1000 UG/ML
1000 INJECTION, SOLUTION INTRAMUSCULAR; SUBCUTANEOUS
Status: CANCELLED | OUTPATIENT
Start: 2019-12-11

## 2019-11-27 RX ADMIN — EPOETIN ALFA 15000 UNITS: 20000 SOLUTION INTRAVENOUS; SUBCUTANEOUS at 09:11

## 2019-12-09 ENCOUNTER — LAB VISIT (OUTPATIENT)
Dept: LAB | Facility: HOSPITAL | Age: 69
End: 2019-12-09
Attending: INTERNAL MEDICINE
Payer: MEDICARE

## 2019-12-09 DIAGNOSIS — D63.1 ANEMIA OF CHRONIC RENAL FAILURE: Primary | ICD-10-CM

## 2019-12-09 DIAGNOSIS — N18.9 ANEMIA OF CHRONIC RENAL FAILURE: Primary | ICD-10-CM

## 2019-12-09 DIAGNOSIS — N18.5 CHRONIC KIDNEY DISEASE, STAGE V: ICD-10-CM

## 2019-12-09 LAB
BASOPHILS # BLD AUTO: 0.03 K/UL (ref 0–0.2)
BASOPHILS NFR BLD: 0.4 % (ref 0–1.9)
DIFFERENTIAL METHOD: ABNORMAL
EOSINOPHIL # BLD AUTO: 0.2 K/UL (ref 0–0.5)
EOSINOPHIL NFR BLD: 2.4 % (ref 0–8)
ERYTHROCYTE [DISTWIDTH] IN BLOOD BY AUTOMATED COUNT: 16.1 % (ref 11.5–14.5)
HCT VFR BLD AUTO: 28.4 % (ref 37–48.5)
HGB BLD-MCNC: 9.2 G/DL (ref 12–16)
IMM GRANULOCYTES # BLD AUTO: 0.03 K/UL (ref 0–0.04)
IMM GRANULOCYTES NFR BLD AUTO: 0.4 % (ref 0–0.5)
LYMPHOCYTES # BLD AUTO: 1.5 K/UL (ref 1–4.8)
LYMPHOCYTES NFR BLD: 22.6 % (ref 18–48)
MCH RBC QN AUTO: 32.6 PG (ref 27–31)
MCHC RBC AUTO-ENTMCNC: 32.4 G/DL (ref 32–36)
MCV RBC AUTO: 101 FL (ref 82–98)
MONOCYTES # BLD AUTO: 0.4 K/UL (ref 0.3–1)
MONOCYTES NFR BLD: 6.5 % (ref 4–15)
NEUTROPHILS # BLD AUTO: 4.6 K/UL (ref 1.8–7.7)
NEUTROPHILS NFR BLD: 67.7 % (ref 38–73)
NRBC BLD-RTO: 0 /100 WBC
PLATELET # BLD AUTO: 157 K/UL (ref 150–350)
PMV BLD AUTO: 11 FL (ref 9.2–12.9)
RBC # BLD AUTO: 2.82 M/UL (ref 4–5.4)
WBC # BLD AUTO: 6.78 K/UL (ref 3.9–12.7)

## 2019-12-09 PROCEDURE — 36415 COLL VENOUS BLD VENIPUNCTURE: CPT

## 2019-12-09 PROCEDURE — 85025 COMPLETE CBC W/AUTO DIFF WBC: CPT

## 2019-12-11 ENCOUNTER — INFUSION (OUTPATIENT)
Dept: INFUSION THERAPY | Facility: HOSPITAL | Age: 69
End: 2019-12-11
Attending: INTERNAL MEDICINE
Payer: MEDICARE

## 2019-12-11 VITALS
HEART RATE: 76 BPM | SYSTOLIC BLOOD PRESSURE: 139 MMHG | TEMPERATURE: 99 F | RESPIRATION RATE: 18 BRPM | DIASTOLIC BLOOD PRESSURE: 70 MMHG | WEIGHT: 136.31 LBS | BODY MASS INDEX: 21.35 KG/M2

## 2019-12-11 DIAGNOSIS — D51.8 ANEMIA OF DECREASED VITAMIN B12 ABSORPTION: ICD-10-CM

## 2019-12-11 DIAGNOSIS — N18.5 ANEMIA DUE TO STAGE 5 CHRONIC KIDNEY DISEASE TREATED WITH ERYTHROPOIETIN: Primary | ICD-10-CM

## 2019-12-11 DIAGNOSIS — D63.1 ANEMIA DUE TO STAGE 5 CHRONIC KIDNEY DISEASE TREATED WITH ERYTHROPOIETIN: Primary | ICD-10-CM

## 2019-12-11 PROCEDURE — 63600175 PHARM REV CODE 636 W HCPCS: Mod: EC | Performed by: INTERNAL MEDICINE

## 2019-12-11 PROCEDURE — 96372 THER/PROPH/DIAG INJ SC/IM: CPT

## 2019-12-11 RX ORDER — CYANOCOBALAMIN 1000 UG/ML
1000 INJECTION, SOLUTION INTRAMUSCULAR; SUBCUTANEOUS
Status: CANCELLED | OUTPATIENT
Start: 2019-12-25

## 2019-12-11 RX ADMIN — EPOETIN ALFA 15000 UNITS: 20000 SOLUTION INTRAVENOUS; SUBCUTANEOUS at 09:12

## 2019-12-16 DIAGNOSIS — M62.838 MUSCLE SPASMS OF NECK: ICD-10-CM

## 2019-12-17 RX ORDER — CYCLOBENZAPRINE HCL 10 MG
TABLET ORAL
Qty: 180 TABLET | Refills: 4 | OUTPATIENT
Start: 2019-12-17

## 2019-12-19 DIAGNOSIS — N18.4 STAGE 4 CHRONIC KIDNEY DISEASE: ICD-10-CM

## 2019-12-19 DIAGNOSIS — D51.8 ANEMIA OF DECREASED VITAMIN B12 ABSORPTION: Primary | ICD-10-CM

## 2019-12-19 RX ORDER — CYANOCOBALAMIN 1000 UG/ML
1000 INJECTION, SOLUTION INTRAMUSCULAR; SUBCUTANEOUS
Status: DISCONTINUED | OUTPATIENT
Start: 2019-12-19 | End: 2020-02-17 | Stop reason: HOSPADM

## 2019-12-24 ENCOUNTER — LAB VISIT (OUTPATIENT)
Dept: LAB | Facility: HOSPITAL | Age: 69
End: 2019-12-24
Attending: INTERNAL MEDICINE
Payer: MEDICARE

## 2019-12-24 DIAGNOSIS — N18.9 ANEMIA OF CHRONIC RENAL FAILURE: Primary | ICD-10-CM

## 2019-12-24 DIAGNOSIS — D63.1 ANEMIA OF CHRONIC RENAL FAILURE: Primary | ICD-10-CM

## 2019-12-24 DIAGNOSIS — N18.6 END STAGE RENAL DISEASE: ICD-10-CM

## 2019-12-24 LAB
BASOPHILS # BLD AUTO: 0.01 K/UL (ref 0–0.2)
BASOPHILS NFR BLD: 0.2 % (ref 0–1.9)
DIFFERENTIAL METHOD: ABNORMAL
EOSINOPHIL # BLD AUTO: 0.2 K/UL (ref 0–0.5)
EOSINOPHIL NFR BLD: 2.8 % (ref 0–8)
ERYTHROCYTE [DISTWIDTH] IN BLOOD BY AUTOMATED COUNT: 16 % (ref 11.5–14.5)
HCT VFR BLD AUTO: 29.3 % (ref 37–48.5)
HGB BLD-MCNC: 9.2 G/DL (ref 12–16)
IMM GRANULOCYTES # BLD AUTO: 0.01 K/UL (ref 0–0.04)
IMM GRANULOCYTES NFR BLD AUTO: 0.2 % (ref 0–0.5)
LYMPHOCYTES # BLD AUTO: 1.5 K/UL (ref 1–4.8)
LYMPHOCYTES NFR BLD: 22.3 % (ref 18–48)
MCH RBC QN AUTO: 32.3 PG (ref 27–31)
MCHC RBC AUTO-ENTMCNC: 31.4 G/DL (ref 32–36)
MCV RBC AUTO: 103 FL (ref 82–98)
MONOCYTES # BLD AUTO: 0.5 K/UL (ref 0.3–1)
MONOCYTES NFR BLD: 7.8 % (ref 4–15)
NEUTROPHILS # BLD AUTO: 4.3 K/UL (ref 1.8–7.7)
NEUTROPHILS NFR BLD: 66.7 % (ref 38–73)
NRBC BLD-RTO: 0 /100 WBC
PLATELET # BLD AUTO: 147 K/UL (ref 150–350)
PMV BLD AUTO: 12.5 FL (ref 9.2–12.9)
RBC # BLD AUTO: 2.85 M/UL (ref 4–5.4)
WBC # BLD AUTO: 6.5 K/UL (ref 3.9–12.7)

## 2019-12-24 PROCEDURE — 85025 COMPLETE CBC W/AUTO DIFF WBC: CPT

## 2019-12-24 PROCEDURE — 36415 COLL VENOUS BLD VENIPUNCTURE: CPT

## 2019-12-26 ENCOUNTER — INFUSION (OUTPATIENT)
Dept: INFUSION THERAPY | Facility: HOSPITAL | Age: 69
End: 2019-12-26
Attending: INTERNAL MEDICINE
Payer: MEDICARE

## 2019-12-26 VITALS
RESPIRATION RATE: 18 BRPM | BODY MASS INDEX: 20.36 KG/M2 | SYSTOLIC BLOOD PRESSURE: 105 MMHG | WEIGHT: 130 LBS | HEART RATE: 84 BPM | DIASTOLIC BLOOD PRESSURE: 67 MMHG | TEMPERATURE: 99 F

## 2019-12-26 DIAGNOSIS — N18.5 ANEMIA DUE TO STAGE 5 CHRONIC KIDNEY DISEASE TREATED WITH ERYTHROPOIETIN: Primary | ICD-10-CM

## 2019-12-26 DIAGNOSIS — D63.1 ANEMIA DUE TO STAGE 5 CHRONIC KIDNEY DISEASE TREATED WITH ERYTHROPOIETIN: Primary | ICD-10-CM

## 2019-12-26 PROCEDURE — 96372 THER/PROPH/DIAG INJ SC/IM: CPT

## 2019-12-26 PROCEDURE — 63600175 PHARM REV CODE 636 W HCPCS: Mod: JG | Performed by: INTERNAL MEDICINE

## 2019-12-26 RX ADMIN — EPOETIN ALFA 15000 UNITS: 20000 SOLUTION INTRAVENOUS; SUBCUTANEOUS at 12:12

## 2019-12-26 NOTE — PLAN OF CARE
Problem: Anemia  Goal: Anemia Symptom Improvement  Outcome: Ongoing, Progressing  Intervention: Monitor and Manage Anemia  Flowsheets (Taken 12/26/2019 1204)  Fatigue Management: paced activity encouraged; frequent rest breaks encouraged

## 2020-01-06 ENCOUNTER — LAB VISIT (OUTPATIENT)
Dept: LAB | Facility: HOSPITAL | Age: 70
End: 2020-01-06
Attending: INTERNAL MEDICINE
Payer: MEDICARE

## 2020-01-06 DIAGNOSIS — D63.1 ANEMIA OF CHRONIC RENAL FAILURE: Primary | ICD-10-CM

## 2020-01-06 DIAGNOSIS — N18.5 CHRONIC KIDNEY DISEASE, STAGE V: ICD-10-CM

## 2020-01-06 DIAGNOSIS — N18.9 ANEMIA OF CHRONIC RENAL FAILURE: Primary | ICD-10-CM

## 2020-01-06 LAB
BASOPHILS # BLD AUTO: 0.03 K/UL (ref 0–0.2)
BASOPHILS NFR BLD: 0.5 % (ref 0–1.9)
DIFFERENTIAL METHOD: ABNORMAL
EOSINOPHIL # BLD AUTO: 0.2 K/UL (ref 0–0.5)
EOSINOPHIL NFR BLD: 2.4 % (ref 0–8)
ERYTHROCYTE [DISTWIDTH] IN BLOOD BY AUTOMATED COUNT: 16 % (ref 11.5–14.5)
HCT VFR BLD AUTO: 27.5 % (ref 37–48.5)
HGB BLD-MCNC: 8.8 G/DL (ref 12–16)
IMM GRANULOCYTES # BLD AUTO: 0.02 K/UL (ref 0–0.04)
IMM GRANULOCYTES NFR BLD AUTO: 0.3 % (ref 0–0.5)
LYMPHOCYTES # BLD AUTO: 1.5 K/UL (ref 1–4.8)
LYMPHOCYTES NFR BLD: 22.2 % (ref 18–48)
MCH RBC QN AUTO: 32.7 PG (ref 27–31)
MCHC RBC AUTO-ENTMCNC: 32 G/DL (ref 32–36)
MCV RBC AUTO: 102 FL (ref 82–98)
MONOCYTES # BLD AUTO: 0.4 K/UL (ref 0.3–1)
MONOCYTES NFR BLD: 6.6 % (ref 4–15)
NEUTROPHILS # BLD AUTO: 4.5 K/UL (ref 1.8–7.7)
NEUTROPHILS NFR BLD: 68 % (ref 38–73)
NRBC BLD-RTO: 0 /100 WBC
PLATELET # BLD AUTO: 138 K/UL (ref 150–350)
PMV BLD AUTO: 11.1 FL (ref 9.2–12.9)
RBC # BLD AUTO: 2.69 M/UL (ref 4–5.4)
WBC # BLD AUTO: 6.63 K/UL (ref 3.9–12.7)

## 2020-01-06 PROCEDURE — 36415 COLL VENOUS BLD VENIPUNCTURE: CPT

## 2020-01-06 PROCEDURE — 85025 COMPLETE CBC W/AUTO DIFF WBC: CPT

## 2020-01-08 ENCOUNTER — INFUSION (OUTPATIENT)
Dept: INFUSION THERAPY | Facility: HOSPITAL | Age: 70
End: 2020-01-08
Attending: INTERNAL MEDICINE
Payer: MEDICARE

## 2020-01-08 VITALS
BODY MASS INDEX: 21.21 KG/M2 | HEART RATE: 79 BPM | OXYGEN SATURATION: 97 % | TEMPERATURE: 98 F | DIASTOLIC BLOOD PRESSURE: 71 MMHG | RESPIRATION RATE: 16 BRPM | SYSTOLIC BLOOD PRESSURE: 133 MMHG | WEIGHT: 135.38 LBS

## 2020-01-08 DIAGNOSIS — D63.1 ANEMIA DUE TO STAGE 5 CHRONIC KIDNEY DISEASE TREATED WITH ERYTHROPOIETIN: Primary | ICD-10-CM

## 2020-01-08 DIAGNOSIS — N18.5 ANEMIA DUE TO STAGE 5 CHRONIC KIDNEY DISEASE TREATED WITH ERYTHROPOIETIN: Primary | ICD-10-CM

## 2020-01-08 PROCEDURE — 96372 THER/PROPH/DIAG INJ SC/IM: CPT

## 2020-01-08 PROCEDURE — 63600175 PHARM REV CODE 636 W HCPCS: Mod: JG | Performed by: INTERNAL MEDICINE

## 2020-01-08 RX ADMIN — EPOETIN ALFA 15000 UNITS: 20000 SOLUTION INTRAVENOUS; SUBCUTANEOUS at 09:01

## 2020-01-08 NOTE — PLAN OF CARE
Problem: Anemia  Goal: Anemia Symptom Improvement  Outcome: Ongoing, Progressing  Intervention: Monitor and Manage Anemia  Flowsheets (Taken 1/8/2020 0907)  Oral Nutrition Promotion: rest periods promoted  Fatigue Management: frequent rest breaks encouraged

## 2020-01-10 ENCOUNTER — LAB VISIT (OUTPATIENT)
Dept: LAB | Facility: HOSPITAL | Age: 70
End: 2020-01-10
Attending: INTERNAL MEDICINE
Payer: MEDICARE

## 2020-01-10 DIAGNOSIS — N18.9 CHRONIC KIDNEY DISEASE, UNSPECIFIED: Primary | ICD-10-CM

## 2020-01-10 LAB
ALBUMIN SERPL BCP-MCNC: 3.7 G/DL (ref 3.5–5.2)
ANION GAP SERPL CALC-SCNC: 7 MMOL/L (ref 8–16)
BASOPHILS # BLD AUTO: 0.02 K/UL (ref 0–0.2)
BASOPHILS NFR BLD: 0.3 % (ref 0–1.9)
BUN SERPL-MCNC: 21 MG/DL (ref 8–23)
CALCIUM SERPL-MCNC: 8.6 MG/DL (ref 8.7–10.5)
CHLORIDE SERPL-SCNC: 120 MMOL/L (ref 95–110)
CO2 SERPL-SCNC: 16 MMOL/L (ref 23–29)
CREAT SERPL-MCNC: 2.8 MG/DL (ref 0.5–1.4)
DIFFERENTIAL METHOD: ABNORMAL
EOSINOPHIL # BLD AUTO: 0.2 K/UL (ref 0–0.5)
EOSINOPHIL NFR BLD: 2.4 % (ref 0–8)
ERYTHROCYTE [DISTWIDTH] IN BLOOD BY AUTOMATED COUNT: 15.6 % (ref 11.5–14.5)
EST. GFR  (AFRICAN AMERICAN): 19.1 ML/MIN/1.73 M^2
EST. GFR  (NON AFRICAN AMERICAN): 16.6 ML/MIN/1.73 M^2
FERRITIN SERPL-MCNC: 322 NG/ML (ref 20–300)
GLUCOSE SERPL-MCNC: 83 MG/DL (ref 70–110)
HCT VFR BLD AUTO: 30.3 % (ref 37–48.5)
HGB BLD-MCNC: 9.5 G/DL (ref 12–16)
IMM GRANULOCYTES # BLD AUTO: 0.06 K/UL (ref 0–0.04)
IMM GRANULOCYTES NFR BLD AUTO: 1 % (ref 0–0.5)
IRON SERPL-MCNC: 64 UG/DL (ref 30–160)
LYMPHOCYTES # BLD AUTO: 1.7 K/UL (ref 1–4.8)
LYMPHOCYTES NFR BLD: 27.4 % (ref 18–48)
MCH RBC QN AUTO: 32.1 PG (ref 27–31)
MCHC RBC AUTO-ENTMCNC: 31.4 G/DL (ref 32–36)
MCV RBC AUTO: 102 FL (ref 82–98)
MONOCYTES # BLD AUTO: 0.4 K/UL (ref 0.3–1)
MONOCYTES NFR BLD: 6.2 % (ref 4–15)
NEUTROPHILS # BLD AUTO: 3.9 K/UL (ref 1.8–7.7)
NEUTROPHILS NFR BLD: 62.7 % (ref 38–73)
NRBC BLD-RTO: 0 /100 WBC
PHOSPHATE SERPL-MCNC: 3.9 MG/DL (ref 2.7–4.5)
PLATELET # BLD AUTO: 145 K/UL (ref 150–350)
PMV BLD AUTO: 11.7 FL (ref 9.2–12.9)
POTASSIUM SERPL-SCNC: 3.5 MMOL/L (ref 3.5–5.1)
RBC # BLD AUTO: 2.96 M/UL (ref 4–5.4)
SATURATED IRON: 37 % (ref 20–50)
SODIUM SERPL-SCNC: 143 MMOL/L (ref 136–145)
TOTAL IRON BINDING CAPACITY: 175 UG/DL (ref 250–450)
TRANSFERRIN SERPL-MCNC: 125 MG/DL (ref 200–375)
WBC # BLD AUTO: 6.25 K/UL (ref 3.9–12.7)

## 2020-01-10 PROCEDURE — 83540 ASSAY OF IRON: CPT

## 2020-01-10 PROCEDURE — 36415 COLL VENOUS BLD VENIPUNCTURE: CPT

## 2020-01-10 PROCEDURE — 85025 COMPLETE CBC W/AUTO DIFF WBC: CPT

## 2020-01-10 PROCEDURE — 80069 RENAL FUNCTION PANEL: CPT

## 2020-01-10 PROCEDURE — 82728 ASSAY OF FERRITIN: CPT

## 2020-01-22 ENCOUNTER — INFUSION (OUTPATIENT)
Dept: INFUSION THERAPY | Facility: HOSPITAL | Age: 70
End: 2020-01-22
Attending: INTERNAL MEDICINE
Payer: MEDICARE

## 2020-01-22 VITALS
RESPIRATION RATE: 16 BRPM | OXYGEN SATURATION: 95 % | HEART RATE: 55 BPM | SYSTOLIC BLOOD PRESSURE: 84 MMHG | DIASTOLIC BLOOD PRESSURE: 51 MMHG | TEMPERATURE: 98 F | BODY MASS INDEX: 19.73 KG/M2 | WEIGHT: 126 LBS

## 2020-01-22 DIAGNOSIS — N18.5 ANEMIA DUE TO STAGE 5 CHRONIC KIDNEY DISEASE TREATED WITH ERYTHROPOIETIN: Primary | ICD-10-CM

## 2020-01-22 DIAGNOSIS — D63.1 ANEMIA DUE TO STAGE 5 CHRONIC KIDNEY DISEASE TREATED WITH ERYTHROPOIETIN: Primary | ICD-10-CM

## 2020-01-22 PROCEDURE — 96372 THER/PROPH/DIAG INJ SC/IM: CPT

## 2020-01-22 PROCEDURE — 63600175 PHARM REV CODE 636 W HCPCS: Mod: JG | Performed by: INTERNAL MEDICINE

## 2020-01-22 RX ADMIN — EPOETIN ALFA 20000 UNITS: 20000 SOLUTION INTRAVENOUS; SUBCUTANEOUS at 09:01

## 2020-01-22 NOTE — PLAN OF CARE
Problem: Anemia  Goal: Anemia Symptom Improvement  Outcome: Ongoing, Progressing  Intervention: Monitor and Manage Anemia  Flowsheets (Taken 1/22/2020 0916)  Oral Nutrition Promotion: rest periods promoted  Fatigue Management: frequent rest breaks encouraged

## 2020-01-23 LAB — A1C: 4.4

## 2020-01-28 ENCOUNTER — TELEPHONE (OUTPATIENT)
Dept: FAMILY MEDICINE | Facility: CLINIC | Age: 70
End: 2020-01-28

## 2020-01-30 DIAGNOSIS — M79.602 ARM PAIN, DIFFUSE, LEFT: ICD-10-CM

## 2020-01-30 DIAGNOSIS — M25.50 ARTHRALGIA, UNSPECIFIED JOINT: ICD-10-CM

## 2020-01-30 DIAGNOSIS — R10.13 EPIGASTRIC PAIN: ICD-10-CM

## 2020-01-30 RX ORDER — TRAMADOL HYDROCHLORIDE 50 MG/1
50 TABLET ORAL EVERY 4 HOURS PRN
Qty: 42 TABLET | Refills: 0 | Status: SHIPPED | OUTPATIENT
Start: 2020-01-30 | End: 2020-02-19

## 2020-02-03 ENCOUNTER — LAB VISIT (OUTPATIENT)
Dept: LAB | Facility: HOSPITAL | Age: 70
End: 2020-02-03
Attending: INTERNAL MEDICINE
Payer: MEDICARE

## 2020-02-03 DIAGNOSIS — N18.9 ANEMIA OF CHRONIC RENAL FAILURE: Primary | ICD-10-CM

## 2020-02-03 DIAGNOSIS — N18.5 CHRONIC KIDNEY DISEASE, STAGE V: ICD-10-CM

## 2020-02-03 DIAGNOSIS — D63.1 ANEMIA OF CHRONIC RENAL FAILURE: Primary | ICD-10-CM

## 2020-02-03 LAB
BASOPHILS # BLD AUTO: 0.03 K/UL (ref 0–0.2)
BASOPHILS NFR BLD: 0.3 % (ref 0–1.9)
DIFFERENTIAL METHOD: ABNORMAL
EOSINOPHIL # BLD AUTO: 0.2 K/UL (ref 0–0.5)
EOSINOPHIL NFR BLD: 2 % (ref 0–8)
ERYTHROCYTE [DISTWIDTH] IN BLOOD BY AUTOMATED COUNT: 16.1 % (ref 11.5–14.5)
HCT VFR BLD AUTO: 31 % (ref 37–48.5)
HGB BLD-MCNC: 10 G/DL (ref 12–16)
IMM GRANULOCYTES # BLD AUTO: 0.03 K/UL (ref 0–0.04)
IMM GRANULOCYTES NFR BLD AUTO: 0.3 % (ref 0–0.5)
LYMPHOCYTES # BLD AUTO: 1.5 K/UL (ref 1–4.8)
LYMPHOCYTES NFR BLD: 16.4 % (ref 18–48)
MCH RBC QN AUTO: 32.1 PG (ref 27–31)
MCHC RBC AUTO-ENTMCNC: 32.3 G/DL (ref 32–36)
MCV RBC AUTO: 99 FL (ref 82–98)
MONOCYTES # BLD AUTO: 0.7 K/UL (ref 0.3–1)
MONOCYTES NFR BLD: 7.4 % (ref 4–15)
NEUTROPHILS # BLD AUTO: 6.5 K/UL (ref 1.8–7.7)
NEUTROPHILS NFR BLD: 73.6 % (ref 38–73)
NRBC BLD-RTO: 0 /100 WBC
PLATELET # BLD AUTO: 176 K/UL (ref 150–350)
PMV BLD AUTO: 11 FL (ref 9.2–12.9)
RBC # BLD AUTO: 3.12 M/UL (ref 4–5.4)
WBC # BLD AUTO: 8.83 K/UL (ref 3.9–12.7)

## 2020-02-03 PROCEDURE — 36415 COLL VENOUS BLD VENIPUNCTURE: CPT

## 2020-02-03 PROCEDURE — 85025 COMPLETE CBC W/AUTO DIFF WBC: CPT

## 2020-02-05 ENCOUNTER — INFUSION (OUTPATIENT)
Dept: INFUSION THERAPY | Facility: HOSPITAL | Age: 70
End: 2020-02-05
Attending: INTERNAL MEDICINE
Payer: MEDICARE

## 2020-02-05 VITALS
SYSTOLIC BLOOD PRESSURE: 112 MMHG | TEMPERATURE: 98 F | BODY MASS INDEX: 19.64 KG/M2 | WEIGHT: 125.38 LBS | HEART RATE: 87 BPM | RESPIRATION RATE: 18 BRPM | DIASTOLIC BLOOD PRESSURE: 64 MMHG

## 2020-02-05 DIAGNOSIS — D63.1 ANEMIA DUE TO STAGE 5 CHRONIC KIDNEY DISEASE TREATED WITH ERYTHROPOIETIN: Primary | ICD-10-CM

## 2020-02-05 DIAGNOSIS — N18.5 ANEMIA DUE TO STAGE 5 CHRONIC KIDNEY DISEASE TREATED WITH ERYTHROPOIETIN: Primary | ICD-10-CM

## 2020-02-05 PROCEDURE — 63600175 PHARM REV CODE 636 W HCPCS: Mod: JG,EC | Performed by: INTERNAL MEDICINE

## 2020-02-05 PROCEDURE — 96372 THER/PROPH/DIAG INJ SC/IM: CPT

## 2020-02-05 RX ADMIN — EPOETIN ALFA 20000 UNITS: 20000 SOLUTION INTRAVENOUS; SUBCUTANEOUS at 02:02

## 2020-02-10 ENCOUNTER — HOSPITAL ENCOUNTER (INPATIENT)
Facility: HOSPITAL | Age: 70
LOS: 4 days | Discharge: HOME HEALTH | DRG: 330 | End: 2020-02-17
Attending: EMERGENCY MEDICINE | Admitting: FAMILY MEDICINE
Payer: MEDICARE

## 2020-02-10 DIAGNOSIS — R05.9 COUGH: ICD-10-CM

## 2020-02-10 DIAGNOSIS — L03.818 CELLULITIS OF OTHER SPECIFIED SITE: ICD-10-CM

## 2020-02-10 DIAGNOSIS — R07.9 CHEST PAIN: ICD-10-CM

## 2020-02-10 DIAGNOSIS — N18.5 STAGE 5 CHRONIC KIDNEY DISEASE NOT ON CHRONIC DIALYSIS: Chronic | ICD-10-CM

## 2020-02-10 DIAGNOSIS — K63.89 PNEUMATOSIS INTESTINALIS: ICD-10-CM

## 2020-02-10 DIAGNOSIS — R11.2 NAUSEA & VOMITING: ICD-10-CM

## 2020-02-10 DIAGNOSIS — K92.1 BLOOD IN STOOL: ICD-10-CM

## 2020-02-10 DIAGNOSIS — D52.0 ANEMIA, MACROCYTIC, NUTRITIONAL: ICD-10-CM

## 2020-02-10 DIAGNOSIS — Z93.2 S/P ILEOSTOMY: Primary | ICD-10-CM

## 2020-02-10 DIAGNOSIS — R53.1 WEAKNESS: ICD-10-CM

## 2020-02-10 DIAGNOSIS — L03.315 CELLULITIS OF PERINEUM: ICD-10-CM

## 2020-02-10 PROBLEM — E11.40 CONTROLLED TYPE 2 DIABETES MELLITUS WITH NEUROPATHY: Chronic | Status: RESOLVED | Noted: 2018-10-31 | Resolved: 2020-02-10

## 2020-02-10 PROBLEM — L03.90 CELLULITIS: Status: ACTIVE | Noted: 2020-02-10

## 2020-02-10 PROBLEM — D63.8 ANEMIA OF CHRONIC DISEASE: Chronic | Status: ACTIVE | Noted: 2020-02-10

## 2020-02-10 PROBLEM — E03.9 HYPOTHYROIDISM: Chronic | Status: ACTIVE | Noted: 2020-02-10

## 2020-02-10 PROBLEM — R19.7 DIARRHEA: Status: ACTIVE | Noted: 2020-02-10

## 2020-02-10 PROBLEM — C51.9 VULVAR CANCER: Chronic | Status: ACTIVE | Noted: 2019-01-07

## 2020-02-10 PROBLEM — E11.40 CONTROLLED TYPE 2 DIABETES MELLITUS WITH NEUROPATHY: Chronic | Status: ACTIVE | Noted: 2018-10-31

## 2020-02-10 PROBLEM — N17.9 ACUTE RENAL FAILURE SUPERIMPOSED ON STAGE 5 CHRONIC KIDNEY DISEASE, NOT ON CHRONIC DIALYSIS: Status: ACTIVE | Noted: 2020-02-10

## 2020-02-10 LAB
ABO + RH BLD: NORMAL
ALBUMIN SERPL BCP-MCNC: 3.4 G/DL (ref 3.5–5.2)
ALP SERPL-CCNC: 117 U/L (ref 55–135)
ALT SERPL W/O P-5'-P-CCNC: 17 U/L (ref 10–44)
ANION GAP SERPL CALC-SCNC: 9 MMOL/L (ref 8–16)
APTT PPP: 29.8 SEC (ref 23.6–33.3)
AST SERPL-CCNC: 17 U/L (ref 10–40)
BACTERIA #/AREA URNS HPF: NEGATIVE /HPF
BASOPHILS # BLD AUTO: 0.02 K/UL (ref 0–0.2)
BASOPHILS NFR BLD: 0.3 % (ref 0–1.9)
BILIRUB SERPL-MCNC: 0.8 MG/DL (ref 0.1–1)
BILIRUB UR QL STRIP: NEGATIVE
BLD GP AB SCN CELLS X3 SERPL QL: NORMAL
BUN SERPL-MCNC: 23 MG/DL (ref 8–23)
CALCIUM SERPL-MCNC: 8.5 MG/DL (ref 8.7–10.5)
CHLORIDE SERPL-SCNC: 111 MMOL/L (ref 95–110)
CLARITY UR: ABNORMAL
CO2 SERPL-SCNC: 20 MMOL/L (ref 23–29)
COLOR UR: YELLOW
CREAT SERPL-MCNC: 3 MG/DL (ref 0.5–1.4)
DIFFERENTIAL METHOD: ABNORMAL
EOSINOPHIL # BLD AUTO: 0.2 K/UL (ref 0–0.5)
EOSINOPHIL NFR BLD: 2.3 % (ref 0–8)
ERYTHROCYTE [DISTWIDTH] IN BLOOD BY AUTOMATED COUNT: 15.2 % (ref 11.5–14.5)
EST. GFR  (AFRICAN AMERICAN): 17.6 ML/MIN/1.73 M^2
EST. GFR  (NON AFRICAN AMERICAN): 15.3 ML/MIN/1.73 M^2
GLUCOSE SERPL-MCNC: 103 MG/DL (ref 70–110)
GLUCOSE UR QL STRIP: NEGATIVE
HCT VFR BLD AUTO: 32.1 % (ref 37–48.5)
HGB BLD-MCNC: 10.2 G/DL (ref 12–16)
HGB UR QL STRIP: ABNORMAL
HYALINE CASTS #/AREA URNS LPF: 29 /LPF
IMM GRANULOCYTES # BLD AUTO: 0.03 K/UL (ref 0–0.04)
IMM GRANULOCYTES NFR BLD AUTO: 0.4 % (ref 0–0.5)
INR PPP: 1.4
KETONES UR QL STRIP: NEGATIVE
LACTATE SERPL-SCNC: 1.4 MMOL/L (ref 0.5–1.9)
LEUKOCYTE ESTERASE UR QL STRIP: ABNORMAL
LYMPHOCYTES # BLD AUTO: 1 K/UL (ref 1–4.8)
LYMPHOCYTES NFR BLD: 12.8 % (ref 18–48)
MAGNESIUM SERPL-MCNC: 1.5 MG/DL (ref 1.6–2.6)
MCH RBC QN AUTO: 32.2 PG (ref 27–31)
MCHC RBC AUTO-ENTMCNC: 31.8 G/DL (ref 32–36)
MCV RBC AUTO: 101 FL (ref 82–98)
MICROSCOPIC COMMENT: ABNORMAL
MONOCYTES # BLD AUTO: 0.5 K/UL (ref 0.3–1)
MONOCYTES NFR BLD: 6.2 % (ref 4–15)
NEUTROPHILS # BLD AUTO: 5.9 K/UL (ref 1.8–7.7)
NEUTROPHILS NFR BLD: 78 % (ref 38–73)
NITRITE UR QL STRIP: NEGATIVE
NRBC BLD-RTO: 0 /100 WBC
PH UR STRIP: 6 [PH] (ref 5–8)
PLATELET # BLD AUTO: 223 K/UL (ref 150–350)
PMV BLD AUTO: 11 FL (ref 9.2–12.9)
POTASSIUM SERPL-SCNC: 3.2 MMOL/L (ref 3.5–5.1)
PROT SERPL-MCNC: 5.5 G/DL (ref 6–8.4)
PROT UR QL STRIP: ABNORMAL
PROTHROMBIN TIME: 16.9 SEC (ref 10.6–14.8)
RBC # BLD AUTO: 3.17 M/UL (ref 4–5.4)
RBC #/AREA URNS HPF: 5 /HPF (ref 0–4)
SODIUM SERPL-SCNC: 140 MMOL/L (ref 136–145)
SP GR UR STRIP: 1.01 (ref 1–1.03)
SQUAMOUS #/AREA URNS HPF: 14 /HPF
TROPONIN I SERPL DL<=0.01 NG/ML-MCNC: <0.03 NG/ML
URN SPEC COLLECT METH UR: ABNORMAL
UROBILINOGEN UR STRIP-ACNC: NEGATIVE EU/DL
WBC # BLD AUTO: 7.55 K/UL (ref 3.9–12.7)
WBC #/AREA URNS HPF: 38 /HPF (ref 0–5)

## 2020-02-10 PROCEDURE — 96375 TX/PRO/DX INJ NEW DRUG ADDON: CPT

## 2020-02-10 PROCEDURE — 85610 PROTHROMBIN TIME: CPT

## 2020-02-10 PROCEDURE — 36415 COLL VENOUS BLD VENIPUNCTURE: CPT

## 2020-02-10 PROCEDURE — 85025 COMPLETE CBC W/AUTO DIFF WBC: CPT

## 2020-02-10 PROCEDURE — 85730 THROMBOPLASTIN TIME PARTIAL: CPT

## 2020-02-10 PROCEDURE — 84484 ASSAY OF TROPONIN QUANT: CPT

## 2020-02-10 PROCEDURE — 99291 CRITICAL CARE FIRST HOUR: CPT

## 2020-02-10 PROCEDURE — 96368 THER/DIAG CONCURRENT INF: CPT

## 2020-02-10 PROCEDURE — 83605 ASSAY OF LACTIC ACID: CPT

## 2020-02-10 PROCEDURE — 63600175 PHARM REV CODE 636 W HCPCS: Performed by: EMERGENCY MEDICINE

## 2020-02-10 PROCEDURE — G0378 HOSPITAL OBSERVATION PER HR: HCPCS

## 2020-02-10 PROCEDURE — 80053 COMPREHEN METABOLIC PANEL: CPT

## 2020-02-10 PROCEDURE — 87040 BLOOD CULTURE FOR BACTERIA: CPT | Mod: 59

## 2020-02-10 PROCEDURE — 81001 URINALYSIS AUTO W/SCOPE: CPT

## 2020-02-10 PROCEDURE — 25000003 PHARM REV CODE 250: Performed by: EMERGENCY MEDICINE

## 2020-02-10 PROCEDURE — 51798 US URINE CAPACITY MEASURE: CPT

## 2020-02-10 PROCEDURE — 86900 BLOOD TYPING SEROLOGIC ABO: CPT

## 2020-02-10 PROCEDURE — 63600175 PHARM REV CODE 636 W HCPCS: Performed by: INTERNAL MEDICINE

## 2020-02-10 PROCEDURE — 83735 ASSAY OF MAGNESIUM: CPT

## 2020-02-10 PROCEDURE — 93005 ELECTROCARDIOGRAM TRACING: CPT

## 2020-02-10 PROCEDURE — 96365 THER/PROPH/DIAG IV INF INIT: CPT

## 2020-02-10 PROCEDURE — 96361 HYDRATE IV INFUSION ADD-ON: CPT

## 2020-02-10 PROCEDURE — 63600175 PHARM REV CODE 636 W HCPCS: Performed by: FAMILY MEDICINE

## 2020-02-10 RX ORDER — SERTRALINE HYDROCHLORIDE 25 MG/1
25 TABLET, FILM COATED ORAL DAILY
Status: DISCONTINUED | OUTPATIENT
Start: 2020-02-11 | End: 2020-02-16

## 2020-02-10 RX ORDER — ONDANSETRON 2 MG/ML
4 INJECTION INTRAMUSCULAR; INTRAVENOUS
Status: COMPLETED | OUTPATIENT
Start: 2020-02-10 | End: 2020-02-10

## 2020-02-10 RX ORDER — HYDROMORPHONE HYDROCHLORIDE 1 MG/ML
1 INJECTION, SOLUTION INTRAMUSCULAR; INTRAVENOUS; SUBCUTANEOUS
Status: COMPLETED | OUTPATIENT
Start: 2020-02-10 | End: 2020-02-10

## 2020-02-10 RX ORDER — ACETAMINOPHEN 325 MG/1
650 TABLET ORAL EVERY 4 HOURS PRN
Status: DISCONTINUED | OUTPATIENT
Start: 2020-02-10 | End: 2020-02-17 | Stop reason: HOSPADM

## 2020-02-10 RX ORDER — FLUCONAZOLE 100 MG/1
200 TABLET ORAL
Status: COMPLETED | OUTPATIENT
Start: 2020-02-10 | End: 2020-02-10

## 2020-02-10 RX ORDER — OXYCODONE AND ACETAMINOPHEN 7.5; 325 MG/1; MG/1
1 TABLET ORAL EVERY 6 HOURS PRN
Status: DISCONTINUED | OUTPATIENT
Start: 2020-02-10 | End: 2020-02-16

## 2020-02-10 RX ORDER — DIPHENHYDRAMINE HCL 25 MG
25 CAPSULE ORAL NIGHTLY PRN
Status: ON HOLD | COMMUNITY
End: 2020-03-06 | Stop reason: HOSPADM

## 2020-02-10 RX ORDER — MAGNESIUM SULFATE HEPTAHYDRATE 40 MG/ML
2 INJECTION, SOLUTION INTRAVENOUS ONCE
Status: COMPLETED | OUTPATIENT
Start: 2020-02-10 | End: 2020-02-10

## 2020-02-10 RX ORDER — VANCOMYCIN HCL IN 5 % DEXTROSE 1G/250ML
20 PLASTIC BAG, INJECTION (ML) INTRAVENOUS ONCE
Status: DISCONTINUED | OUTPATIENT
Start: 2020-02-10 | End: 2020-02-14

## 2020-02-10 RX ORDER — POLYETHYLENE GLYCOL 3350 17 G/17G
17 POWDER, FOR SOLUTION ORAL DAILY
Status: DISCONTINUED | OUTPATIENT
Start: 2020-02-11 | End: 2020-02-16

## 2020-02-10 RX ORDER — POTASSIUM CHLORIDE 20 MEQ/15ML
40 SOLUTION ORAL ONCE
Status: COMPLETED | OUTPATIENT
Start: 2020-02-10 | End: 2020-02-10

## 2020-02-10 RX ORDER — ENOXAPARIN SODIUM 100 MG/ML
30 INJECTION SUBCUTANEOUS EVERY 24 HOURS
Status: DISCONTINUED | OUTPATIENT
Start: 2020-02-10 | End: 2020-02-17 | Stop reason: HOSPADM

## 2020-02-10 RX ORDER — SODIUM CHLORIDE 9 MG/ML
INJECTION, SOLUTION INTRAVENOUS CONTINUOUS
Status: ACTIVE | OUTPATIENT
Start: 2020-02-10 | End: 2020-02-11

## 2020-02-10 RX ORDER — HYDROMORPHONE HYDROCHLORIDE 1 MG/ML
0.5 INJECTION, SOLUTION INTRAMUSCULAR; INTRAVENOUS; SUBCUTANEOUS EVERY 6 HOURS PRN
Status: DISCONTINUED | OUTPATIENT
Start: 2020-02-10 | End: 2020-02-17 | Stop reason: HOSPADM

## 2020-02-10 RX ORDER — TALC
6 POWDER (GRAM) TOPICAL NIGHTLY PRN
Status: DISCONTINUED | OUTPATIENT
Start: 2020-02-10 | End: 2020-02-17 | Stop reason: HOSPADM

## 2020-02-10 RX ORDER — SODIUM CHLORIDE 0.9 % (FLUSH) 0.9 %
2 SYRINGE (ML) INJECTION
Status: DISCONTINUED | OUTPATIENT
Start: 2020-02-10 | End: 2020-02-17 | Stop reason: HOSPADM

## 2020-02-10 RX ORDER — SERTRALINE HYDROCHLORIDE 50 MG/1
50 TABLET, FILM COATED ORAL DAILY
Status: ON HOLD | COMMUNITY
End: 2020-03-06 | Stop reason: HOSPADM

## 2020-02-10 RX ORDER — ENOXAPARIN SODIUM 100 MG/ML
40 INJECTION SUBCUTANEOUS EVERY 24 HOURS
Status: DISCONTINUED | OUTPATIENT
Start: 2020-02-10 | End: 2020-02-10

## 2020-02-10 RX ORDER — LANOLIN ALCOHOL/MO/W.PET/CERES
400 CREAM (GRAM) TOPICAL 2 TIMES DAILY
Status: ON HOLD | COMMUNITY
End: 2020-03-06 | Stop reason: HOSPADM

## 2020-02-10 RX ORDER — LEVOTHYROXINE SODIUM 88 UG/1
88 TABLET ORAL EVERY MORNING
Status: DISCONTINUED | OUTPATIENT
Start: 2020-02-11 | End: 2020-02-17 | Stop reason: HOSPADM

## 2020-02-10 RX ORDER — IPRATROPIUM BROMIDE AND ALBUTEROL SULFATE 2.5; .5 MG/3ML; MG/3ML
3 SOLUTION RESPIRATORY (INHALATION) EVERY 6 HOURS PRN
Status: DISCONTINUED | OUTPATIENT
Start: 2020-02-10 | End: 2020-02-17 | Stop reason: HOSPADM

## 2020-02-10 RX ORDER — ACETAMINOPHEN 325 MG/1
650 TABLET ORAL EVERY 8 HOURS PRN
Status: DISCONTINUED | OUTPATIENT
Start: 2020-02-10 | End: 2020-02-17 | Stop reason: HOSPADM

## 2020-02-10 RX ORDER — ONDANSETRON 2 MG/ML
4 INJECTION INTRAMUSCULAR; INTRAVENOUS EVERY 8 HOURS PRN
Status: DISCONTINUED | OUTPATIENT
Start: 2020-02-10 | End: 2020-02-17 | Stop reason: HOSPADM

## 2020-02-10 RX ADMIN — HYDROMORPHONE HYDROCHLORIDE 0.5 MG: 1 INJECTION, SOLUTION INTRAMUSCULAR; INTRAVENOUS; SUBCUTANEOUS at 11:02

## 2020-02-10 RX ADMIN — MAGNESIUM SULFATE 2 G: 2 INJECTION INTRAVENOUS at 04:02

## 2020-02-10 RX ADMIN — HYDROMORPHONE HYDROCHLORIDE 1 MG: 1 INJECTION, SOLUTION INTRAMUSCULAR; INTRAVENOUS; SUBCUTANEOUS at 03:02

## 2020-02-10 RX ADMIN — ENOXAPARIN SODIUM 30 MG: 100 INJECTION SUBCUTANEOUS at 07:02

## 2020-02-10 RX ADMIN — SODIUM CHLORIDE: 0.9 INJECTION, SOLUTION INTRAVENOUS at 05:02

## 2020-02-10 RX ADMIN — MEROPENEM 1 G: 1 INJECTION, POWDER, FOR SOLUTION INTRAVENOUS at 04:02

## 2020-02-10 RX ADMIN — ONDANSETRON 4 MG: 2 INJECTION INTRAMUSCULAR; INTRAVENOUS at 03:02

## 2020-02-10 RX ADMIN — POTASSIUM CHLORIDE 40 MEQ: 20 SOLUTION ORAL at 04:02

## 2020-02-10 RX ADMIN — SODIUM CHLORIDE 1000 ML: 9 INJECTION, SOLUTION INTRAVENOUS at 03:02

## 2020-02-10 RX ADMIN — FLUCONAZOLE 200 MG: 100 TABLET ORAL at 03:02

## 2020-02-10 NOTE — ED NOTES
Attemtped to place catheter for urine sample. Extremely painful for pt. Unable to visualize urethra. MD notified.

## 2020-02-10 NOTE — ASSESSMENT & PLAN NOTE
With history of vulvar cancer, cannot rule out recurrence  IV Meropenem, vancomycin    Dr. Francis consulted  Dr. Lovell consulted  Dr. Dietz consulted  Appreciate consultant's

## 2020-02-10 NOTE — PROGRESS NOTES
Pharmacist Renal Dose Adjustment Note    Shara Hutchins is a 69 y.o. female being treated with the medication LOVENOX    Patient Data:    Vital Signs (Most Recent):  Temp: 98.2 °F (36.8 °C) (02/10/20 1310)  Pulse: 69 (02/10/20 1630)  Resp: 16 (02/10/20 1630)  BP: 135/73 (02/10/20 1630)  SpO2: 96 % (02/10/20 1630) Vital Signs (72h Range):  Temp:  [98.2 °F (36.8 °C)]   Pulse:  [69-98]   Resp:  [11-18]   BP: (132-138)/(63-74)   SpO2:  [96 %-100 %]      Recent Labs   Lab 02/10/20  1336   CREATININE 3.0*     Serum creatinine: 3 mg/dL (H) 02/10/20 1336  Estimated creatinine clearance: 15.6 mL/min (A)    Medication:LOVENOX dose: 40MG frequency Q24H will be changed to medication:LOVENOX  dose: 30 MG frequency:Q24H  Because the Est CrCl < 30    Pharmacist's Name: Ankur Jacobson  Pharmacist's Extension: [phone]

## 2020-02-10 NOTE — H&P
[facility] Medicine  History & Physical    Patient Name: Shara Hutchins  MRN: [id number]  Admission Date: 2/10/2020  Attending Physician: Grant Ravi MD   Primary Care Provider: April Horton MD         Patient information was obtained from patient, past medical records and ER records.     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  Patient reports that she has been having diarrhea multiple times a day for several years.  Reports that last Friday she was in usual health when she some developed some nausea and nonbloody nonbilious emesis and increased diarrhea.  Denies any fever, chills, abdominal pain. Patient reports that she had decreased urine output over the weekend and has not had any significant output since Saturday night or Sunday morning.  Patient reports that her diarrhea denied improved however nausea somewhat improved.  Patient cannot tolerate anymore and came into the ED for further evaluation.  Patient also reports that she has been having worsening pain in arm volar area.  Reports that she had history of vulvar cancer in the past and she feels like it has returned.    In the ED, patient received Diflucan, meropenem, vancomycin in the ED.  Patient was afebrile, without leukocytosis.  Lactic acid was unremarkable.  CT abdomen found possible pneumatosis intestinalis however patient had benign abdominal exam.  General surgery was at bedside on admission.    Past Medical  History:   Diagnosis Date    Chronic kidney disease, stage III (moderate) 1/7/2018    Diabetes mellitus, type 2     Fatigue     Hypertension     Iron deficiency anemia due to chronic blood loss 1/7/2018    Vulvar cancer 1/7/2019       Past Surgical History:   Procedure Laterality Date    ABDOMINAL SURGERY      BACK SURGERY      CERVICAL FUSION      x 4    cervix repair      CHOLECYSTECTOMY  2000    HYSTERECTOMY  1980    LUMBAR LAMINECTOMY      NECK SURGERY      SHOULDER SURGERY      vulva cancer         Review of patient's allergies indicates:   Allergen Reactions    Ciprofloxacin Other (See Comments)     Elevated liver enzymes      Pcn [penicillins] Anaphylaxis       Current Facility-Administered Medications on File Prior to Encounter   Medication    cyanocobalamin injection 1,000 mcg     Current Outpatient Medications on File Prior to Encounter   Medication Sig    calcitRIOL (ROCALTROL) 0.5 MCG Cap Take 1 capsule by mouth once daily.    dicyclomine (BENTYL) 10 MG capsule TAKE 1 CAPSULE THREE TIMES A DAY (Patient taking differently: Take 10 mg by mouth 3 (three) times daily. )    diphenhydrAMINE (BENADRYL) 25 mg capsule Take 25 mg by mouth nightly as needed for Itching.    levothyroxine (SYNTHROID) 88 MCG tablet TAKE 1 TABLET DAILY (Patient taking differently: Take 88 mcg by mouth every morning. )    magnesium oxide (MAG-OX) 400 mg (241.3 mg magnesium) tablet Take 400 mg by mouth 2 (two) times daily.    potassium chloride (KLOR-CON) 10 MEQ TbSR Take 20 mEq by mouth 2 (two) times daily.     promethazine (PHENERGAN) 25 MG tablet Take 1 tablet (25 mg total) by mouth daily as needed for Nausea.    sertraline (ZOLOFT) 25 MG tablet Take 25 mg by mouth once daily.    sodium bicarbonate 650 MG tablet Take 650 mg by mouth 3 (three) times daily.     traMADol (ULTRAM) 50 mg tablet Take 1 tablet (50 mg total) by mouth every 4 (four) hours as needed for Pain.    [DISCONTINUED] CARTIA  mg Cp24  TAKE 1 CAPSULE DAILY    [DISCONTINUED] cyclobenzaprine (FLEXERIL) 10 MG tablet Take 1 tablet (10 mg total) by mouth 2 (two) times daily.    [DISCONTINUED] ferrous sulfate 325 mg (65 mg iron) CpSR Take 1 tablet by mouth once daily.    [DISCONTINUED] magnesium oxide (MAG-OX) 400 mg tablet Take 1 tablet (400 mg total) by mouth once daily. (Patient taking differently: Take 400 mg by mouth 2 (two) times daily. )     Family History     Problem Relation (Age of Onset)    Cancer Mother    Heart disease Mother, Father, Sister    Hypertension Mother    No Known Problems Daughter        Tobacco Use    Smoking status: Former Smoker     Packs/day: 1.00     Years: 33.00     Pack years: 33.00     Types: Cigarettes     Last attempt to quit: 2000     Years since quittin.5    Smokeless tobacco: Never Used   Substance and Sexual Activity    Alcohol use: No    Drug use: No    Sexual activity: Not on file     Review of Systems   Constitutional: Negative for chills, fatigue, fever and unexpected weight change.   HENT: Negative for ear pain, rhinorrhea, sneezing and sore throat.    Eyes: Negative for visual disturbance.   Respiratory: Negative for cough, chest tightness and shortness of breath.    Cardiovascular: Negative for chest pain.   Gastrointestinal: Negative for abdominal pain, constipation, diarrhea, nausea and vomiting.   Endocrine: Negative for polyuria.   Genitourinary: Negative for dysuria and hematuria.   Neurological: Negative for seizures and headaches.     Objective:     Vital Signs (Most Recent):  Temp: 98.2 °F (36.8 °C) (02/10/20 1310)  Pulse: 71 (02/10/20 1622)  Resp: 13 (02/10/20 1530)  BP: 138/74 (02/10/20 1622)  SpO2: 99 % (02/10/20 1622) Vital Signs (24h Range):  Temp:  [98.2 °F (36.8 °C)] 98.2 °F (36.8 °C)  Pulse:  [71-98] 71  Resp:  [11-18] 13  SpO2:  [97 %-100 %] 99 %  BP: (132-138)/(63-74) 138/74     Weight: 55.8 kg (123 lb)  Body mass index is 19.26 kg/m².    Physical Exam   Constitutional:  She is oriented to person, place, and time. No distress.   Thin appearing   HENT:   Head: Normocephalic and atraumatic.   Right Ear: External ear normal.   Left Ear: External ear normal.   Nose: Nose normal.   Mouth/Throat: Oropharynx is clear and moist. No oropharyngeal exudate.   Dentures   Eyes: Pupils are equal, round, and reactive to light. Conjunctivae and EOM are normal. Right eye exhibits no discharge. Left eye exhibits no discharge. No scleral icterus.   Neck: Normal range of motion. Neck supple. No thyromegaly present.   Cardiovascular: Normal rate, regular rhythm, normal heart sounds and intact distal pulses. Exam reveals no gallop and no friction rub.   No murmur heard.  Pulmonary/Chest: Effort normal and breath sounds normal. No respiratory distress. She has no wheezes. She has no rales. She exhibits no tenderness.   Abdominal: Soft. Bowel sounds are normal. She exhibits no distension and no mass. There is no tenderness. There is no rebound and no guarding. No hernia.   Genitourinary: Pelvic exam was performed with patient prone. There is tenderness on the right labia. There is tenderness on the left labia.   Genitourinary Comments: Erythematous, extremely tender around vulvar area   Musculoskeletal: Normal range of motion. She exhibits no edema or tenderness.   Lymphadenopathy:     She has no cervical adenopathy.   Neurological: She is alert and oriented to person, place, and time.   Skin: Skin is warm. She is not diaphoretic.   Psychiatric: She has a normal mood and affect. Her behavior is normal. Judgment and thought content normal.   Nursing note and vitals reviewed.        CRANIAL NERVES     CN III, IV, VI   Pupils are equal, round, and reactive to light.  Extraocular motions are normal.        Significant Labs:   CBC:   Recent Labs   Lab 02/10/20  1336   WBC 7.55   HGB 10.2*   HCT 32.1*        CMP:   Recent Labs   Lab 02/10/20  1336      K 3.2*   *   CO2 20*      BUN 23    CREATININE 3.0*   CALCIUM 8.5*   PROT 5.5*   ALBUMIN 3.4*   BILITOT 0.8   ALKPHOS 117   AST 17   ALT 17   ANIONGAP 9   EGFRNONAA 15.3*     Cardiac Markers: No results for input(s): CKMB, MYOGLOBIN, BNP, TROPISTAT in the last 48 hours.  Lactic Acid:   Recent Labs   Lab 02/10/20  1514   LACTATE 1.4     Troponin:   Recent Labs   Lab 02/10/20  1336   TROPONINI <0.030     EKG  My personal interpretation: No acute ST elevation or depression appreciated     Significant Imaging:     CT Renal Stone Study ABD Pelvis WO [[id number]] Resulted: 02/10/20 1548   Order Status: Completed Updated: 02/10/20 1551   Narrative:       CMS MANDATED QUALITY DATA - CT RADIATION - 436    All CT scans at this facility utilize dose modulation, iterative reconstruction, and/or weight based dosing when appropriate to reduce radiation dose to as low as reasonably achievable.    EXAMINATION:  CT RENAL STONE STUDY ABD PELVIS WO    CLINICAL HISTORY:  Abdominal pain, chronic renal failure, cellulitis perineum;  history of vulvar cancer    TECHNIQUE:  CT abdomen and pelvis without IV or oral contrast. Study is tailored for detection of urinary tract calculi and evaluation of solid organs, hollow viscera, and vascular structures is limited.    COMPARISON:  10/24/2018    FINDINGS:  CT Abdomen:    Partially visualized lung bases clear aside from right lower lobe calcified granuloma.    Severe diffuse hepatic steatosis is new.  Surgical clips in gallbladder fossa indicate cholecystectomy.    Diffusely atrophic pancreatic parenchyma remains unchanged.  Prominence of extrahepatic bile duct is unchanged, likely related to post cholecystectomy state.  Noncontrast spleen and adrenals are normal.    Bilateral nonobstructing renal calculi are present, similar to prior exam.  Negative for hydronephrosis or ureterolithiasis.    Mild gaseous distention of the colon is evident.  Stippled gas along posterior wall of ascending colon suggest pneumatosis, possibly  continuing into region of hepatic flexure, without associated colonic wall thickening.  Mild gaseous distention of the colon is diffuse, within physiologic limits.  The small intestines are unremarkable.  Surgical staples about the cecum suggest prior appendectomy, with no appendix identified.  No free intraperitoneal gas.    Mild diffuse subcutaneous fat stranding occurs about the abdomen.    Convex left thoracolumbar spine curvature is evident with moderate degenerative changes affecting the spine.    CT Pelvis:    Uterus has been removed.  No adnexal mass or free pelvic fluid.  No enlarged lymph nodes.  Bladder is normal.    Soft tissues about the vulva and visualized perineum show no gas, focal fluid collection, or mass.  Mild diffuse subcutaneous fat stranding is evident.    Moderate right hip osteoarthrosis present with degenerative changes of the pubic symphysis also noted.   Impression:       1. Severe diffuse hepatic steatosis.  2. Nonobstructing bilateral renal calculi.  3. Mild gaseous distention of colon, with stippled gas along posterior wall of ascending colon possibly reflecting pneumatosis intestinalis.  Clinical and laboratory correlation is requested for potential in intestinal ischemia, although benign causes or pneumatosis intestinalis can also give a similar appearance.  4. Mild diffuse subcutaneous fat stranding suggesting edema, with no soft tissue gas, abscess, or recurrent mass about the vulvar soft tissues.  I discussed the findings with Dr. Restrepo at time of dictation.         X-Ray Chest AP Portable [[id number]] Resulted: 02/10/20 1445   Order Status: Completed Updated: 02/10/20 1448   Narrative:     EXAMINATION:  XR CHEST AP PORTABLE    CLINICAL HISTORY:  Cough    FINDINGS:  Portable chest at 14:47 hours is compared to prior study dated 07/22/2019 shows normal cardiomediastinal silhouette. There is a right subclavian vein Port-A-Cath with tip overlying the superior vena cava.  There is  vascular stent in the left upper extremity.    Lungs are clear. Pulmonary vasculature is normal. No acute osseous abnormality.   Impression:       No acute cardiopulmonary abnormality.         Assessment/Plan:     Active Hospital Problems    Diagnosis    *Cellulitis    Nausea & vomiting    Diarrhea    Acute renal failure superimposed on stage 5 chronic kidney disease, not on chronic dialysis    Hypothyroidism    Anemia of chronic disease    Hisotry of Vulvar cancer    Stage 5 chronic kidney disease not on chronic dialysis    Inflammatory bowel disease       * Cellulitis  With history of vulvar cancer, cannot rule out recurrence  IV Meropenem, vancomycin    Dr. Francis consulted  Dr. Lovell consulted  Dr. Dietz consulted  Appreciate consultant's      Nausea & vomiting  Clears, IVF, Antiemetics  Stool studies, C diff    VTE Risk Mitigation (From admission, onward)         Ordered     enoxaparin injection 40 mg  Daily      02/10/20 1721     IP VTE HIGH RISK PATIENT  Once      02/10/20 1721                   Grant Ravi MD  Department of Hospital Medicine   [facility]  Date of service: 02/10/2020 5:31 PM

## 2020-02-10 NOTE — HPI
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  Patient reports that she has been having diarrhea multiple times a day for several years.  Reports that last Friday she was in usual health when she some developed some nausea and nonbloody nonbilious emesis and increased diarrhea.  Denies any fever, chills, abdominal pain. Patient reports that she had decreased urine output over the weekend and has not had any significant output since Saturday night or Sunday morning.  Patient reports that her diarrhea denied improved however nausea somewhat improved.  Patient cannot tolerate anymore and came into the ED for further evaluation.  Patient also reports that she has been having worsening pain in arm volar area.  Reports that she had history of vulvar cancer in the past and she feels like it has returned.    In the ED, patient received Diflucan, meropenem, vancomycin in the ED.  Patient was afebrile, without leukocytosis.  Lactic acid was unremarkable.  CT abdomen found possible pneumatosis intestinalis however patient had benign abdominal exam.  General surgery was at bedside on admission.

## 2020-02-10 NOTE — SUBJECTIVE & OBJECTIVE
Past Medical History:   Diagnosis Date    Chronic kidney disease, stage III (moderate) 1/7/2018    Diabetes mellitus, type 2     Fatigue     Hypertension     Iron deficiency anemia due to chronic blood loss 1/7/2018    Vulvar cancer 1/7/2019       Past Surgical History:   Procedure Laterality Date    ABDOMINAL SURGERY      BACK SURGERY      CERVICAL FUSION      x 4    cervix repair      CHOLECYSTECTOMY  2000    HYSTERECTOMY  1980    LUMBAR LAMINECTOMY      NECK SURGERY      SHOULDER SURGERY      vulva cancer         Review of patient's allergies indicates:   Allergen Reactions    Ciprofloxacin Other (See Comments)     Elevated liver enzymes      Pcn [penicillins] Anaphylaxis       Current Facility-Administered Medications on File Prior to Encounter   Medication    cyanocobalamin injection 1,000 mcg     Current Outpatient Medications on File Prior to Encounter   Medication Sig    calcitRIOL (ROCALTROL) 0.5 MCG Cap Take 1 capsule by mouth once daily.    dicyclomine (BENTYL) 10 MG capsule TAKE 1 CAPSULE THREE TIMES A DAY (Patient taking differently: Take 10 mg by mouth 3 (three) times daily. )    diphenhydrAMINE (BENADRYL) 25 mg capsule Take 25 mg by mouth nightly as needed for Itching.    levothyroxine (SYNTHROID) 88 MCG tablet TAKE 1 TABLET DAILY (Patient taking differently: Take 88 mcg by mouth every morning. )    magnesium oxide (MAG-OX) 400 mg (241.3 mg magnesium) tablet Take 400 mg by mouth 2 (two) times daily.    potassium chloride (KLOR-CON) 10 MEQ TbSR Take 20 mEq by mouth 2 (two) times daily.     promethazine (PHENERGAN) 25 MG tablet Take 1 tablet (25 mg total) by mouth daily as needed for Nausea.    sertraline (ZOLOFT) 25 MG tablet Take 25 mg by mouth once daily.    sodium bicarbonate 650 MG tablet Take 650 mg by mouth 3 (three) times daily.     traMADol (ULTRAM) 50 mg tablet Take 1 tablet (50 mg total) by mouth every 4 (four) hours as needed for Pain.    [DISCONTINUED] CARTIA   mg Cp24 TAKE 1 CAPSULE DAILY    [DISCONTINUED] cyclobenzaprine (FLEXERIL) 10 MG tablet Take 1 tablet (10 mg total) by mouth 2 (two) times daily.    [DISCONTINUED] ferrous sulfate 325 mg (65 mg iron) CpSR Take 1 tablet by mouth once daily.    [DISCONTINUED] magnesium oxide (MAG-OX) 400 mg tablet Take 1 tablet (400 mg total) by mouth once daily. (Patient taking differently: Take 400 mg by mouth 2 (two) times daily. )     Family History     Problem Relation (Age of Onset)    Cancer Mother    Heart disease Mother, Father, Sister    Hypertension Mother    No Known Problems Daughter        Tobacco Use    Smoking status: Former Smoker     Packs/day: 1.00     Years: 33.00     Pack years: 33.00     Types: Cigarettes     Last attempt to quit: 2000     Years since quittin.5    Smokeless tobacco: Never Used   Substance and Sexual Activity    Alcohol use: No    Drug use: No    Sexual activity: Not on file     Review of Systems   Constitutional: Negative for chills, fatigue, fever and unexpected weight change.   HENT: Negative for ear pain, rhinorrhea, sneezing and sore throat.    Eyes: Negative for visual disturbance.   Respiratory: Negative for cough, chest tightness and shortness of breath.    Cardiovascular: Negative for chest pain.   Gastrointestinal: Negative for abdominal pain, constipation, diarrhea, nausea and vomiting.   Endocrine: Negative for polyuria.   Genitourinary: Negative for dysuria and hematuria.   Neurological: Negative for seizures and headaches.     Objective:     Vital Signs (Most Recent):  Temp: 98.2 °F (36.8 °C) (02/10/20 1310)  Pulse: 71 (02/10/20 1622)  Resp: 13 (02/10/20 1530)  BP: 138/74 (02/10/20 1622)  SpO2: 99 % (02/10/20 1622) Vital Signs (24h Range):  Temp:  [98.2 °F (36.8 °C)] 98.2 °F (36.8 °C)  Pulse:  [71-98] 71  Resp:  [11-18] 13  SpO2:  [97 %-100 %] 99 %  BP: (132-138)/(63-74) 138/74     Weight: 55.8 kg (123 lb)  Body mass index is 19.26 kg/m².    Physical Exam    Constitutional: She is oriented to person, place, and time. No distress.   Thin appearing   HENT:   Head: Normocephalic and atraumatic.   Right Ear: External ear normal.   Left Ear: External ear normal.   Nose: Nose normal.   Mouth/Throat: Oropharynx is clear and moist. No oropharyngeal exudate.   Dentures   Eyes: Pupils are equal, round, and reactive to light. Conjunctivae and EOM are normal. Right eye exhibits no discharge. Left eye exhibits no discharge. No scleral icterus.   Neck: Normal range of motion. Neck supple. No thyromegaly present.   Cardiovascular: Normal rate, regular rhythm, normal heart sounds and intact distal pulses. Exam reveals no gallop and no friction rub.   No murmur heard.  Pulmonary/Chest: Effort normal and breath sounds normal. No respiratory distress. She has no wheezes. She has no rales. She exhibits no tenderness.   Abdominal: Soft. Bowel sounds are normal. She exhibits no distension and no mass. There is no tenderness. There is no rebound and no guarding. No hernia.   Genitourinary: Pelvic exam was performed with patient prone. There is tenderness on the right labia. There is tenderness on the left labia.   Genitourinary Comments: Erythematous, extremely tender around vulvar area   Musculoskeletal: Normal range of motion. She exhibits no edema or tenderness.   Lymphadenopathy:     She has no cervical adenopathy.   Neurological: She is alert and oriented to person, place, and time.   Skin: Skin is warm. She is not diaphoretic.   Psychiatric: She has a normal mood and affect. Her behavior is normal. Judgment and thought content normal.   Nursing note and vitals reviewed.        CRANIAL NERVES     CN III, IV, VI   Pupils are equal, round, and reactive to light.  Extraocular motions are normal.        Significant Labs:   CBC:   Recent Labs   Lab 02/10/20  1336   WBC 7.55   HGB 10.2*   HCT 32.1*        CMP:   Recent Labs   Lab 02/10/20  1336      K 3.2*   *   CO2 20*       BUN 23   CREATININE 3.0*   CALCIUM 8.5*   PROT 5.5*   ALBUMIN 3.4*   BILITOT 0.8   ALKPHOS 117   AST 17   ALT 17   ANIONGAP 9   EGFRNONAA 15.3*     Cardiac Markers: No results for input(s): CKMB, MYOGLOBIN, BNP, TROPISTAT in the last 48 hours.  Lactic Acid:   Recent Labs   Lab 02/10/20  1514   LACTATE 1.4     Troponin:   Recent Labs   Lab 02/10/20  1336   TROPONINI <0.030     EKG  My personal interpretation: No acute ST elevation or depression appreciated     Significant Imaging:     CT Renal Stone Study ABD Pelvis WO [[id number]] Resulted: 02/10/20 1548   Order Status: Completed Updated: 02/10/20 1551   Narrative:       CMS MANDATED QUALITY DATA - CT RADIATION - 436    All CT scans at this facility utilize dose modulation, iterative reconstruction, and/or weight based dosing when appropriate to reduce radiation dose to as low as reasonably achievable.    EXAMINATION:  CT RENAL STONE STUDY ABD PELVIS WO    CLINICAL HISTORY:  Abdominal pain, chronic renal failure, cellulitis perineum;  history of vulvar cancer    TECHNIQUE:  CT abdomen and pelvis without IV or oral contrast. Study is tailored for detection of urinary tract calculi and evaluation of solid organs, hollow viscera, and vascular structures is limited.    COMPARISON:  10/24/2018    FINDINGS:  CT Abdomen:    Partially visualized lung bases clear aside from right lower lobe calcified granuloma.    Severe diffuse hepatic steatosis is new.  Surgical clips in gallbladder fossa indicate cholecystectomy.    Diffusely atrophic pancreatic parenchyma remains unchanged.  Prominence of extrahepatic bile duct is unchanged, likely related to post cholecystectomy state.  Noncontrast spleen and adrenals are normal.    Bilateral nonobstructing renal calculi are present, similar to prior exam.  Negative for hydronephrosis or ureterolithiasis.    Mild gaseous distention of the colon is evident.  Stippled gas along posterior wall of ascending colon suggest  pneumatosis, possibly continuing into region of hepatic flexure, without associated colonic wall thickening.  Mild gaseous distention of the colon is diffuse, within physiologic limits.  The small intestines are unremarkable.  Surgical staples about the cecum suggest prior appendectomy, with no appendix identified.  No free intraperitoneal gas.    Mild diffuse subcutaneous fat stranding occurs about the abdomen.    Convex left thoracolumbar spine curvature is evident with moderate degenerative changes affecting the spine.    CT Pelvis:    Uterus has been removed.  No adnexal mass or free pelvic fluid.  No enlarged lymph nodes.  Bladder is normal.    Soft tissues about the vulva and visualized perineum show no gas, focal fluid collection, or mass.  Mild diffuse subcutaneous fat stranding is evident.    Moderate right hip osteoarthrosis present with degenerative changes of the pubic symphysis also noted.   Impression:       1. Severe diffuse hepatic steatosis.  2. Nonobstructing bilateral renal calculi.  3. Mild gaseous distention of colon, with stippled gas along posterior wall of ascending colon possibly reflecting pneumatosis intestinalis.  Clinical and laboratory correlation is requested for potential in intestinal ischemia, although benign causes or pneumatosis intestinalis can also give a similar appearance.  4. Mild diffuse subcutaneous fat stranding suggesting edema, with no soft tissue gas, abscess, or recurrent mass about the vulvar soft tissues.  I discussed the findings with Dr. Restrepo at time of dictation.         X-Ray Chest AP Portable [[id number]] Resulted: 02/10/20 1445   Order Status: Completed Updated: 02/10/20 1448   Narrative:     EXAMINATION:  XR CHEST AP PORTABLE    CLINICAL HISTORY:  Cough    FINDINGS:  Portable chest at 14:47 hours is compared to prior study dated 07/22/2019 shows normal cardiomediastinal silhouette. There is a right subclavian vein Port-A-Cath with tip overlying the superior  vena cava.  There is vascular stent in the left upper extremity.    Lungs are clear. Pulmonary vasculature is normal. No acute osseous abnormality.   Impression:       No acute cardiopulmonary abnormality.

## 2020-02-10 NOTE — ED TRIAGE NOTES
Pt reports she has hx of cancer. Pt states she was in remission until she believes now. Pt reports that due to a medication she used to take for the ca she has chronic diarrhea. Denies fever . Pt reports decreased appetite and has been loosing weight within the last six months. Pt reports nausea intermittently since she had previous ca, but reports that vomiting has started within the past two day. Pt reports she also has not urinated more than 100cc in the past 24 hours. PT also reports redness, swelling, and  itching to the vaginal area that began about two weeks ago.

## 2020-02-10 NOTE — ED PROVIDER NOTES
Encounter Date: 2/10/2020       History     Chief Complaint   Patient presents with    Diarrhea     does have blood in her diarrhea that is chronic    Urinary Retention     for 2 days     Nausea    Vomiting     Patient with a remote history of vulvar cancer.  Patient does report she is had perineal and pubic cellulitis since then.  Patient reports increasing pain and swelling to her pubic and perineal area over the last 10 days.  Pain is severe in nature.  Patient things vulvar cancer could have returned.  There is no fever or chills. Patient does have chronic diarrhea since chemotherapy for vulvar carcinoma.  Patient denies any other significant abdominal pain. Patient does have chronic renal sufficiency.  She reports she has not ate or drank anything today and has decreased urinary output.        Review of patient's allergies indicates:   Allergen Reactions    Ciprofloxacin Other (See Comments)     Elevated liver enzymes      Pcn [penicillins] Anaphylaxis     Past Medical History:   Diagnosis Date    Chronic kidney disease, stage III (moderate) 1/7/2018    Diabetes mellitus, type 2     Fatigue     Hypertension     Iron deficiency anemia due to chronic blood loss 1/7/2018    Vulvar cancer 1/7/2019     Past Surgical History:   Procedure Laterality Date    ABDOMINAL SURGERY      BACK SURGERY      CERVICAL FUSION      x 4    cervix repair      CHOLECYSTECTOMY  2000    HYSTERECTOMY  1980    LUMBAR LAMINECTOMY      NECK SURGERY      SHOULDER SURGERY      vulva cancer       Family History   Problem Relation Age of Onset    Heart disease Mother     Hypertension Mother     Cancer Mother         lung    Heart disease Father     Heart disease Sister         Open heart surgery    No Known Problems Daughter      Social History     Tobacco Use    Smoking status: Former Smoker     Packs/day: 1.00     Years: 33.00     Pack years: 33.00     Types: Cigarettes     Last attempt to quit: 7/22/2000      Years since quittin.5    Smokeless tobacco: Never Used   Substance Use Topics    Alcohol use: No    Drug use: No     Review of Systems   Constitutional: Negative for chills and fever.   HENT: Negative for congestion.    Eyes: Negative for visual disturbance.   Respiratory: Negative for shortness of breath.    Cardiovascular: Negative for chest pain and palpitations.   Gastrointestinal: Positive for vomiting.   Genitourinary: Negative for dysuria and hematuria.   Musculoskeletal: Negative for joint swelling.   Neurological: Positive for weakness. Negative for headaches.   Psychiatric/Behavioral: Negative for confusion.       Physical Exam     Initial Vitals [02/10/20 1310]   BP Pulse Resp Temp SpO2   136/66 98 18 98.2 °F (36.8 °C) 97 %      MAP       --         Physical Exam    Nursing note and vitals reviewed.  Constitutional: She is not diaphoretic. No distress.   HENT:   Head: Normocephalic and atraumatic.   Eyes: Conjunctivae are normal.   Neck: Normal range of motion.   Cardiovascular: Normal rate.   Pulmonary/Chest: Breath sounds normal.   Abdominal: Soft. Bowel sounds are normal.   Very mild diffuse abdominal tenderness with no guarding or rebound.  No abdominal swelling.   Genitourinary:   Genitourinary Comments: There is some pubic mass palpable.  Patient does have vulvar and perineal erythema and swelling..  Erythematous area is very tender.  There is no fluctuance.  No purulent drainage.   Musculoskeletal: Normal range of motion.   Neurological: She is alert and oriented to person, place, and time. She has normal strength. No cranial nerve deficit or sensory deficit.   No gross deficits   Skin: No rash noted.   Psychiatric: She has a normal mood and affect.         ED Course   Critical Care  Date/Time: 2/10/2020 4:13 PM  Performed by: Edwardo Restrepo MD  Authorized by: Edwardo Restrepo MD   Direct patient critical care time: 15 minutes  Additional history critical care time: 5  minutes  Ordering / reviewing critical care time: 5 minutes  Documentation critical care time: 5 minutes  Consulting other physicians critical care time: 5 minutes  Total critical care time (exclusive of procedural time) : 35 minutes        Labs Reviewed   CBC W/ AUTO DIFFERENTIAL - Abnormal; Notable for the following components:       Result Value    RBC 3.17 (*)     Hemoglobin 10.2 (*)     Hematocrit 32.1 (*)     Mean Corpuscular Volume 101 (*)     Mean Corpuscular Hemoglobin 32.2 (*)     Mean Corpuscular Hemoglobin Conc 31.8 (*)     RDW 15.2 (*)     Gran% 78.0 (*)     Lymph% 12.8 (*)     All other components within normal limits   COMPREHENSIVE METABOLIC PANEL - Abnormal; Notable for the following components:    Potassium 3.2 (*)     Chloride 111 (*)     CO2 20 (*)     Creatinine 3.0 (*)     Calcium 8.5 (*)     Total Protein 5.5 (*)     Albumin 3.4 (*)     eGFR if  17.6 (*)     eGFR if non  15.3 (*)     All other components within normal limits   PROTIME-INR - Abnormal; Notable for the following components:    PT 16.9 (*)     All other components within normal limits   MAGNESIUM - Abnormal; Notable for the following components:    Magnesium 1.5 (*)     All other components within normal limits   CULTURE, BLOOD   CULTURE, BLOOD   CLOSTRIDIUM DIFFICILE   CULTURE, STOOL   MAGNESIUM   APTT   TROPONIN I   URINALYSIS   LACTIC ACID, PLASMA   OCCULT BLOOD X 1, STOOL   STOOL EXAM-OVA,CYSTS,PARASITES   WBC, STOOL   TROPONIN I   APTT   TYPE & SCREEN          Imaging Results          X-Ray Chest AP Portable (Final result)  Result time 02/10/20 14:45:31    Final result by Meredith Colunga MD (02/10/20 14:45:31)                 Impression:      No acute cardiopulmonary abnormality.      Electronically signed by: Meredith Colunga MD  Date:    02/10/2020  Time:    14:45             Narrative:    EXAMINATION:  XR CHEST AP PORTABLE    CLINICAL HISTORY:  Cough    FINDINGS:  Portable chest at 14:47  hours is compared to prior study dated 07/22/2019 shows normal cardiomediastinal silhouette. There is a right subclavian vein Port-A-Cath with tip overlying the superior vena cava.  There is vascular stent in the left upper extremity.    Lungs are clear. Pulmonary vasculature is normal. No acute osseous abnormality.                                 Medical Decision Making:   History:   Old Medical Records: I decided to obtain old medical records.  Clinical Tests:   Lab Tests: Reviewed  Radiological Study: Reviewed  Medical Tests: Reviewed  ED Management:  Patient presents with several complaints. Patient is having worsening gastrointestinal symptoms including diarrhea and vomiting.  Stool studies are pending.  Most worrisome is patient with possible pneumatosis intestinalis and ascending colon.  General surgery consulted.  CT findings are somewhat equivocal.  Patient does have a normal lactate level.  Patient has a non surgical abdomen with very mild pain. Broad-spectrum antibiotics initiated.  Also patient with vulvar and peritoneal cellulitis.  Discussed with radiologist.  He does not see any abscess or findings concerning for for knees gangrene.  Broad-spectrum antibiotics initiated as well. Hospitalist consulted for admission.  Patient reexamined.  After pain medication abdominal pain is now completely resolved.  Patient is resting comfortably in bed.  Family at bedside.                   ED Course as of Feb 10 1506   Mon Feb 10, 2020   1343 Reports has a past medical history of a vulvar cancer patient underwent many sessions of radiation and subsequently developed radiation enteritis she reports that she has had diarrhea for the last several days she also reports she has had problems recently with her kidneys and is in renal failure.  She reports she does still make urine however her output has remarkably decreased the last few days she also reports that she thinks that however vulvar cancer has returned in the  last few days she noticed that her genital region is totally abnormal appearing.  Patient did see Dr. Villarreal in the past when she had vulvar cancer.    [MP]      ED Course User Index  [MP] ANDRÉS Amos                Clinical Impression:       ICD-10-CM ICD-9-CM   1. Pneumatosis intestinalis K63.89 569.89   2. Blood in stool K92.1 578.1   3. Cough R05 786.2   4. Weakness R53.1 780.79   5. Cellulitis of perineum L03.315 682.2                             Edwardo Restrepo MD  02/10/20 1613

## 2020-02-11 LAB
ALBUMIN SERPL BCP-MCNC: 2.7 G/DL (ref 3.5–5.2)
ANION GAP SERPL CALC-SCNC: 8 MMOL/L (ref 8–16)
ANION GAP SERPL CALC-SCNC: 8 MMOL/L (ref 8–16)
BASOPHILS # BLD AUTO: 0.02 K/UL (ref 0–0.2)
BASOPHILS NFR BLD: 0.4 % (ref 0–1.9)
BUN SERPL-MCNC: 23 MG/DL (ref 8–23)
BUN SERPL-MCNC: 23 MG/DL (ref 8–23)
C DIFF GDH STL QL: NEGATIVE
C DIFF TOX A+B STL QL IA: NEGATIVE
CALCIUM SERPL-MCNC: 7.9 MG/DL (ref 8.7–10.5)
CALCIUM SERPL-MCNC: 7.9 MG/DL (ref 8.7–10.5)
CHLORIDE SERPL-SCNC: 115 MMOL/L (ref 95–110)
CHLORIDE SERPL-SCNC: 115 MMOL/L (ref 95–110)
CO2 SERPL-SCNC: 18 MMOL/L (ref 23–29)
CO2 SERPL-SCNC: 18 MMOL/L (ref 23–29)
CREAT SERPL-MCNC: 2.7 MG/DL (ref 0.5–1.4)
CREAT SERPL-MCNC: 2.7 MG/DL (ref 0.5–1.4)
DIFFERENTIAL METHOD: ABNORMAL
EOSINOPHIL # BLD AUTO: 0.1 K/UL (ref 0–0.5)
EOSINOPHIL NFR BLD: 2.4 % (ref 0–8)
ERYTHROCYTE [DISTWIDTH] IN BLOOD BY AUTOMATED COUNT: 15.3 % (ref 11.5–14.5)
ERYTHROCYTE [DISTWIDTH] IN BLOOD BY AUTOMATED COUNT: 15.3 % (ref 11.5–14.5)
EST. GFR  (AFRICAN AMERICAN): 20 ML/MIN/1.73 M^2
EST. GFR  (AFRICAN AMERICAN): 20 ML/MIN/1.73 M^2
EST. GFR  (NON AFRICAN AMERICAN): 17.3 ML/MIN/1.73 M^2
EST. GFR  (NON AFRICAN AMERICAN): 17.3 ML/MIN/1.73 M^2
GLUCOSE SERPL-MCNC: 61 MG/DL (ref 70–110)
GLUCOSE SERPL-MCNC: 61 MG/DL (ref 70–110)
HCT VFR BLD AUTO: 26 % (ref 37–48.5)
HCT VFR BLD AUTO: 26 % (ref 37–48.5)
HGB BLD-MCNC: 8.2 G/DL (ref 12–16)
HGB BLD-MCNC: 8.2 G/DL (ref 12–16)
IMM GRANULOCYTES # BLD AUTO: 0.04 K/UL (ref 0–0.04)
IMM GRANULOCYTES NFR BLD AUTO: 0.8 % (ref 0–0.5)
LYMPHOCYTES # BLD AUTO: 0.8 K/UL (ref 1–4.8)
LYMPHOCYTES NFR BLD: 15.8 % (ref 18–48)
MAGNESIUM SERPL-MCNC: 2 MG/DL (ref 1.6–2.6)
MCH RBC QN AUTO: 32.4 PG (ref 27–31)
MCH RBC QN AUTO: 32.4 PG (ref 27–31)
MCHC RBC AUTO-ENTMCNC: 31.5 G/DL (ref 32–36)
MCHC RBC AUTO-ENTMCNC: 31.5 G/DL (ref 32–36)
MCV RBC AUTO: 103 FL (ref 82–98)
MCV RBC AUTO: 103 FL (ref 82–98)
MONOCYTES # BLD AUTO: 0.5 K/UL (ref 0.3–1)
MONOCYTES NFR BLD: 8.9 % (ref 4–15)
NEUTROPHILS # BLD AUTO: 3.6 K/UL (ref 1.8–7.7)
NEUTROPHILS NFR BLD: 71.7 % (ref 38–73)
NRBC BLD-RTO: 0 /100 WBC
OB PNL STL: NEGATIVE
PHOSPHATE SERPL-MCNC: 2.8 MG/DL (ref 2.7–4.5)
PHOSPHATE SERPL-MCNC: 2.8 MG/DL (ref 2.7–4.5)
PLATELET # BLD AUTO: 142 K/UL (ref 150–350)
PLATELET # BLD AUTO: 142 K/UL (ref 150–350)
PMV BLD AUTO: 11.5 FL (ref 9.2–12.9)
PMV BLD AUTO: 11.5 FL (ref 9.2–12.9)
POTASSIUM SERPL-SCNC: 3.8 MMOL/L (ref 3.5–5.1)
POTASSIUM SERPL-SCNC: 3.8 MMOL/L (ref 3.5–5.1)
RBC # BLD AUTO: 2.53 M/UL (ref 4–5.4)
RBC # BLD AUTO: 2.53 M/UL (ref 4–5.4)
SODIUM SERPL-SCNC: 141 MMOL/L (ref 136–145)
SODIUM SERPL-SCNC: 141 MMOL/L (ref 136–145)
VANCOMYCIN SERPL-MCNC: 11 UG/ML
WBC # BLD AUTO: 5.07 K/UL (ref 3.9–12.7)
WBC # BLD AUTO: 5.07 K/UL (ref 3.9–12.7)
WBC #/AREA STL HPF: ABNORMAL /[HPF]

## 2020-02-11 PROCEDURE — 25000003 PHARM REV CODE 250: Performed by: INTERNAL MEDICINE

## 2020-02-11 PROCEDURE — 83735 ASSAY OF MAGNESIUM: CPT

## 2020-02-11 PROCEDURE — 80069 RENAL FUNCTION PANEL: CPT

## 2020-02-11 PROCEDURE — 87046 STOOL CULTR AEROBIC BACT EA: CPT

## 2020-02-11 PROCEDURE — 63600175 PHARM REV CODE 636 W HCPCS: Performed by: INTERNAL MEDICINE

## 2020-02-11 PROCEDURE — 87340 HEPATITIS B SURFACE AG IA: CPT

## 2020-02-11 PROCEDURE — 36415 COLL VENOUS BLD VENIPUNCTURE: CPT

## 2020-02-11 PROCEDURE — 86706 HEP B SURFACE ANTIBODY: CPT

## 2020-02-11 PROCEDURE — 89055 LEUKOCYTE ASSESSMENT FECAL: CPT

## 2020-02-11 PROCEDURE — G0378 HOSPITAL OBSERVATION PER HR: HCPCS

## 2020-02-11 PROCEDURE — 86704 HEP B CORE ANTIBODY TOTAL: CPT

## 2020-02-11 PROCEDURE — 87045 FECES CULTURE AEROBIC BACT: CPT

## 2020-02-11 PROCEDURE — 87209 SMEAR COMPLEX STAIN: CPT

## 2020-02-11 PROCEDURE — 80202 ASSAY OF VANCOMYCIN: CPT

## 2020-02-11 PROCEDURE — 82272 OCCULT BLD FECES 1-3 TESTS: CPT

## 2020-02-11 PROCEDURE — 85025 COMPLETE CBC W/AUTO DIFF WBC: CPT

## 2020-02-11 PROCEDURE — 63600175 PHARM REV CODE 636 W HCPCS: Performed by: FAMILY MEDICINE

## 2020-02-11 PROCEDURE — 87324 CLOSTRIDIUM AG IA: CPT

## 2020-02-11 PROCEDURE — 25000003 PHARM REV CODE 250: Performed by: FAMILY MEDICINE

## 2020-02-11 PROCEDURE — 87015 SPECIMEN INFECT AGNT CONCNTJ: CPT

## 2020-02-11 PROCEDURE — 99900035 HC TECH TIME PER 15 MIN (STAT)

## 2020-02-11 PROCEDURE — 87449 NOS EACH ORGANISM AG IA: CPT

## 2020-02-11 RX ORDER — VANCOMYCIN HCL IN 5 % DEXTROSE 1G/250ML
1000 PLASTIC BAG, INJECTION (ML) INTRAVENOUS ONCE
Status: COMPLETED | OUTPATIENT
Start: 2020-02-11 | End: 2020-02-11

## 2020-02-11 RX ADMIN — ENOXAPARIN SODIUM 30 MG: 100 INJECTION SUBCUTANEOUS at 05:02

## 2020-02-11 RX ADMIN — OXYCODONE AND ACETAMINOPHEN 1 TABLET: 7.5; 325 TABLET ORAL at 03:02

## 2020-02-11 RX ADMIN — SERTRALINE HYDROCHLORIDE 25 MG: 25 TABLET ORAL at 09:02

## 2020-02-11 RX ADMIN — LEVOTHYROXINE SODIUM 88 MCG: 88 TABLET ORAL at 06:02

## 2020-02-11 RX ADMIN — EPOETIN ALFA-EPBX 10000 UNITS: 10000 INJECTION, SOLUTION INTRAVENOUS; SUBCUTANEOUS at 05:02

## 2020-02-11 RX ADMIN — MEROPENEM 500 MG: 500 INJECTION, POWDER, FOR SOLUTION INTRAVENOUS at 06:02

## 2020-02-11 RX ADMIN — OXYCODONE AND ACETAMINOPHEN 1 TABLET: 7.5; 325 TABLET ORAL at 04:02

## 2020-02-11 RX ADMIN — OXYCODONE AND ACETAMINOPHEN 1 TABLET: 7.5; 325 TABLET ORAL at 09:02

## 2020-02-11 RX ADMIN — HYDROMORPHONE HYDROCHLORIDE 0.5 MG: 1 INJECTION, SOLUTION INTRAMUSCULAR; INTRAVENOUS; SUBCUTANEOUS at 10:02

## 2020-02-11 RX ADMIN — VANCOMYCIN HYDROCHLORIDE 1000 MG: 1 INJECTION, POWDER, LYOPHILIZED, FOR SOLUTION INTRAVENOUS at 09:02

## 2020-02-11 RX ADMIN — MEROPENEM 500 MG: 500 INJECTION, POWDER, FOR SOLUTION INTRAVENOUS at 05:02

## 2020-02-11 NOTE — PROGRESS NOTES
VANCOMYCIN PHARMACOKINETIC NOTE:  Vancomycin Day # 1    Objective/Assessment:    Diagnosis/Indication for Vancomycin: Cellulitis     69 y.o., female; Actual Body Weight = 55.8 kg (123 lb).    The patient has the following labs:  2/10/2020 Estimated Creatinine Clearance: 15.6 mL/min (A) (based on SCr of 3 mg/dL (H)). Lab Results   Component Value Date    BUN 23 02/10/2020       Lab Results   Component Value Date    WBC 7.55 02/10/2020          Plan:  Adjust vancomycin dose and/or frequency based on the patient's actual weight and renal function:  Initiate Vancomycin 1000 mg IV intermittent dosing.  Orders have been entered into patient's chart.      Vancomycin random level has been ordered for 2/11/20 @ 18:00.    Pharmacy will manage vancomycin therapy, monitor serum vancomycin levels, monitor renal function and adjust regimen as necessary.    Thank you for allowing us to participate in this patient's care.     Aubrey Zuniga 2/10/2020 10:37 PM  Department of Pharmacy  Ext [phone]

## 2020-02-11 NOTE — CONSULTS
[facility]  General Surgery  Consult Note    Inpatient consult to General Surgery  Consult performed by: Robbi Lovell III, MD  Consult ordered by: Grant Ravi MD  Reason for consult: Evidence of pneumatosis in the ascending colon on CT        Subjective:     Chief Complaint/Reason for Admission:  Evidence of pneumatosis in the ascending colon on CT    History of Present Illness:  Patient is 69-year-old female with past medical history of vulvar cancer, advanced chronic kidney disease.  She is status post radiation and chemotherapy in 2013.  She states she now has recurrence of the vulvar cancer.  She has chronic radiation changes, nausea and diarrhea since finishing treatment in 2013.  Patient states she will have between 6 and 15 episodes of diarrhea daily.  There is usually blood-tinged stool at every bowel movement.  She presented today due to 3 day history of worsening nausea and vomiting.  She states she has taken in very little food and water since Friday.  She reports chronic lower abdominal pain with what seemed to be worsening abdominal pain over the last couple days.  Noncontrast CT scan was performed that showed evidence of pneumatosis in the ascending colon.  There is no free air, colonic wall thickening or inflammatory fat stranding.  She is hemodynamically stable and afebrile.  She is awake and alert.  White blood cell count is normal at 7.5.  H and H is 10/32.  Creatinine is elevated to 3.0.  It appears her baseline creatinine is around 2.5.  She has never had dialysis in the past but there is a left upper quadrant AV fistula in place. She has history of exploratory laparotomy, hysterectomy, cholecystectomy.    Current Facility-Administered Medications on File Prior to Encounter   Medication    cyanocobalamin injection 1,000 mcg     Current Outpatient Medications on File Prior to Encounter   Medication Sig    calcitRIOL (ROCALTROL) 0.5 MCG Cap Take 1 capsule by mouth once  daily.    dicyclomine (BENTYL) 10 MG capsule TAKE 1 CAPSULE THREE TIMES A DAY (Patient taking differently: Take 10 mg by mouth 3 (three) times daily. )    diphenhydrAMINE (BENADRYL) 25 mg capsule Take 25 mg by mouth nightly as needed for Itching.    levothyroxine (SYNTHROID) 88 MCG tablet TAKE 1 TABLET DAILY (Patient taking differently: Take 88 mcg by mouth every morning. )    magnesium oxide (MAG-OX) 400 mg (241.3 mg magnesium) tablet Take 400 mg by mouth 2 (two) times daily.    potassium chloride (KLOR-CON) 10 MEQ TbSR Take 20 mEq by mouth 2 (two) times daily.     promethazine (PHENERGAN) 25 MG tablet Take 1 tablet (25 mg total) by mouth daily as needed for Nausea.    sertraline (ZOLOFT) 25 MG tablet Take 25 mg by mouth once daily.    sodium bicarbonate 650 MG tablet Take 650 mg by mouth 3 (three) times daily.     traMADol (ULTRAM) 50 mg tablet Take 1 tablet (50 mg total) by mouth every 4 (four) hours as needed for Pain.    [DISCONTINUED] CARTIA  mg Cp24 TAKE 1 CAPSULE DAILY    [DISCONTINUED] cyclobenzaprine (FLEXERIL) 10 MG tablet Take 1 tablet (10 mg total) by mouth 2 (two) times daily.    [DISCONTINUED] ferrous sulfate 325 mg (65 mg iron) CpSR Take 1 tablet by mouth once daily.    [DISCONTINUED] magnesium oxide (MAG-OX) 400 mg tablet Take 1 tablet (400 mg total) by mouth once daily. (Patient taking differently: Take 400 mg by mouth 2 (two) times daily. )       Review of patient's allergies indicates:   Allergen Reactions    Ciprofloxacin Other (See Comments)     Elevated liver enzymes      Pcn [penicillins] Anaphylaxis       Past Medical History:   Diagnosis Date    Chronic kidney disease, stage III (moderate) 1/7/2018    Diabetes mellitus, type 2     Fatigue     Hypertension     Iron deficiency anemia due to chronic blood loss 1/7/2018    Vulvar cancer 1/7/2019     Past Surgical History:   Procedure Laterality Date    ABDOMINAL SURGERY      BACK SURGERY      CERVICAL FUSION      x  4    cervix repair      CHOLECYSTECTOMY      HYSTERECTOMY  1980    LUMBAR LAMINECTOMY      NECK SURGERY      SHOULDER SURGERY      vulva cancer       Family History     Problem Relation (Age of Onset)    Cancer Mother    Heart disease Mother, Father, Sister    Hypertension Mother    No Known Problems Daughter        Tobacco Use    Smoking status: Former Smoker     Packs/day: 1.00     Years: 33.00     Pack years: 33.00     Types: Cigarettes     Last attempt to quit: 2000     Years since quittin.5    Smokeless tobacco: Never Used   Substance and Sexual Activity    Alcohol use: No    Drug use: No    Sexual activity: Not on file     Review of Systems   Constitutional: Positive for activity change, appetite change, fatigue and unexpected weight change. Negative for chills and fever.   HENT: Negative for hearing loss, rhinorrhea, sore throat and voice change.    Eyes: Negative for photophobia and visual disturbance.   Respiratory: Negative for cough, choking and shortness of breath.    Cardiovascular: Negative for chest pain, palpitations and leg swelling.   Gastrointestinal: Positive for abdominal pain, diarrhea, nausea and vomiting. Negative for blood in stool and constipation.   Endocrine: Negative for cold intolerance, heat intolerance, polydipsia and polyuria.   Genitourinary: Positive for decreased urine volume and vaginal pain.   Musculoskeletal: Negative for arthralgias, back pain, joint swelling and neck stiffness.   Skin: Positive for color change. Negative for pallor and rash.   Neurological: Negative for dizziness, seizures, syncope and headaches.   Hematological: Negative for adenopathy. Does not bruise/bleed easily.   Psychiatric/Behavioral: Negative for agitation, behavioral problems and confusion.     Objective:     Vital Signs (Most Recent):  Temp: 98.2 °F (36.8 °C) (02/10/20 1310)  Pulse: 71 (02/10/20 1800)  Resp: 14 (02/10/20 1800)  BP: 113/64 (02/10/20 1800)  SpO2: 98 %  (02/10/20 1800) Vital Signs (24h Range):  Temp:  [98.2 °F (36.8 °C)] 98.2 °F (36.8 °C)  Pulse:  [69-98] 71  Resp:  [11-18] 14  SpO2:  [96 %-100 %] 98 %  BP: (113-138)/(63-74) 113/64     Weight: 55.8 kg (123 lb)  Body mass index is 19.26 kg/m².      Intake/Output Summary (Last 24 hours) at 2/10/2020 1826  Last data filed at 2/10/2020 1818  Gross per 24 hour   Intake 1100 ml   Output --   Net 1100 ml       Physical Exam   Constitutional: Vital signs are normal. She is cooperative.  Non-toxic appearance. She has a sickly appearance. No distress.   Frail, chronically ill-appearing female in no acute distress   Neck: Phonation normal. Neck supple.   Pulmonary/Chest: Effort normal. No accessory muscle usage. No tachypnea and no bradypnea. No respiratory distress.   Abdominal: Soft. She exhibits no distension. There is tenderness. There is no rigidity, no rebound and no guarding.       Currently mild tenderness in the lower abdomen without guarding or rebound.  Abdomen is nondistended.  Abdomen is soft   Genitourinary:       There is tenderness and lesion on the right labia. There is tenderness and lesion on the left labia.   Genitourinary Comments: The labia, vagina, perineum and groins are red with skin changes.  Patient states that this is the squamous cell recurrence.  It is tender to touch.  There are no open draining wounds.   Neurological: She is alert.       Significant Labs:  CBC:   Recent Labs   Lab 02/10/20  1336   WBC 7.55   RBC 3.17*   HGB 10.2*   HCT 32.1*      *   MCH 32.2*   MCHC 31.8*     CMP:   Recent Labs   Lab 02/10/20  1336      CALCIUM 8.5*   ALBUMIN 3.4*   PROT 5.5*      K 3.2*   CO2 20*   *   BUN 23   CREATININE 3.0*   ALKPHOS 117   ALT 17   AST 17   BILITOT 0.8       Significant Diagnostics:  I have reviewed all pertinent imaging results/findings within the past 24 hours.    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 CT shows evidence of pneumatosis in the ascending colon without any surrounding stranding, wall thickening, free air.  Physical exam currently benign.  Will observe here initially.  Will have very low threshold for laparoscopy or laparotomy for any clinical decline or physical exam changes.  Agree with IV fluid resuscitation and antibiotics.  Thank you for your consult. I will follow-up with patient. Please contact us if you have any additional questions.    Robbi Lovell III, MD  General Surgery  [facility]

## 2020-02-11 NOTE — ASSESSMENT & PLAN NOTE
With history of vulvar cancer, cannot rule out recurrence  Improving  IV Meropenem, vancomycin    Dr. Francis consulted  Dr. Lovell consulted  Dr. Dietz consulted  Appreciate consultant's

## 2020-02-11 NOTE — ASSESSMENT & PLAN NOTE
Concerns for pneumatosis intestinalis on CT, however abdomen is benign on admission  IVF, Antiemetics  Full liquid with supplementation  Stool, C diff neg

## 2020-02-11 NOTE — SUBJECTIVE & OBJECTIVE
Interval History:  Patient reports feeling well.  No abdominal pain however has not eaten anything but clear liquids.  Patient reports that she has pain when she eats a diet.  Abdomen currently benign.  Afebrile.  Hemodynamically stable.  No leukocytosis.  Continues to be IV antibiotics for possible cellulitis.  Renal function improving on IV fluids.  Stool cultures unremarkable.  Oncology will see patient today.    Review of Systems   Constitutional: Negative for chills, fatigue, fever and unexpected weight change.   HENT: Negative for sore throat.    Eyes: Negative for visual disturbance.   Respiratory: Negative for cough, chest tightness and shortness of breath.    Cardiovascular: Negative for chest pain.   Gastrointestinal: Negative for abdominal pain, constipation, diarrhea, nausea and vomiting.   Endocrine: Negative for polyuria.   Genitourinary: Negative for dysuria and hematuria.   Neurological: Negative for headaches.     Objective:     Vital Signs (Most Recent):  Temp: 98 °F (36.7 °C) (02/11/20 1129)  Pulse: (!) 51 (02/11/20 1129)  Resp: 20 (02/11/20 1129)  BP: (!) 108/57 (02/11/20 1129)  SpO2: 100 % (02/11/20 1129) Vital Signs (24h Range):  Temp:  [98 °F (36.7 °C)-98.4 °F (36.9 °C)] 98 °F (36.7 °C)  Pulse:  [51-98] 51  Resp:  [11-20] 20  SpO2:  [92 %-100 %] 100 %  BP: (102-138)/(50-74) 108/57     Weight: 56.7 kg (125 lb)  Body mass index is 19.58 kg/m².    Intake/Output Summary (Last 24 hours) at 2/11/2020 1218  Last data filed at 2/11/2020 0856  Gross per 24 hour   Intake 1990 ml   Output 401 ml   Net 1589 ml      Physical Exam   Constitutional: She is oriented to person, place, and time. No distress.   Thin appearing   HENT:   Right Ear: External ear normal.   Left Ear: External ear normal.   Eyes: Conjunctivae and EOM are normal. Right eye exhibits no discharge. Left eye exhibits no discharge. No scleral icterus.   Neck: Normal range of motion.   Cardiovascular: Normal rate, regular rhythm, normal  heart sounds and intact distal pulses. Exam reveals no gallop and no friction rub.   No murmur heard.  Pulmonary/Chest: Effort normal and breath sounds normal. No respiratory distress. She has no wheezes. She has no rales. She exhibits no tenderness.   Abdominal: Soft. Bowel sounds are normal. She exhibits no distension. There is no tenderness. There is no guarding.   Genitourinary: Pelvic exam was performed with patient prone. There is tenderness on the right labia. There is tenderness on the left labia.   Genitourinary Comments: Mildly erythematous, extremely tender around vulvar area   Musculoskeletal: Normal range of motion. She exhibits no edema or tenderness.   Neurological: She is alert and oriented to person, place, and time.   Skin: Skin is warm. She is not diaphoretic.   Psychiatric: She has a normal mood and affect. Her behavior is normal. Judgment and thought content normal.   Nursing note and vitals reviewed.      Significant Labs:   CBC:   Recent Labs   Lab 02/10/20  1336 02/11/20  0539   WBC 7.55 5.07  5.07   HGB 10.2* 8.2*  8.2*   HCT 32.1* 26.0*  26.0*    142*  142*     CMP:   Recent Labs   Lab 02/10/20  1336 02/11/20  0539    141  141   K 3.2* 3.8  3.8   * 115*  115*   CO2 20* 18*  18*    61*  61*   BUN 23 23  23   CREATININE 3.0* 2.7*  2.7*   CALCIUM 8.5* 7.9*  7.9*   PROT 5.5*  --    ALBUMIN 3.4* 2.7*   BILITOT 0.8  --    ALKPHOS 117  --    AST 17  --    ALT 17  --    ANIONGAP 9 8  8   EGFRNONAA 15.3* 17.3*  17.3*

## 2020-02-11 NOTE — CONSULTS
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  Patient reports that she has been having diarrhea multiple times a day for several years.  Reports that last Friday she was in usual health when she some developed some nausea and nonbloody nonbilious emesis and increased diarrhea.  Denies any fever, chills, abdominal pain. Patient reports that she had decreased urine output over the weekend and has not had any significant output since Saturday night or Sunday morning.  Patient reports that her diarrhea denied improved however nausea somewhat improved.  Patient cannot tolerate anymore and came into the ED for further evaluation.  Patient also reports that she has been having worsening pain in arm volar area.  Reports that she had history of vulvar cancer in the past and she feels like it has returned.     In the ED, patient received Diflucan, meropenem, vancomycin in the ED.  Patient was afebrile, without leukocytosis.  Lactic acid was unremarkable.  CT abdomen found possible pneumatosis intestinalis however patient had benign abdominal exam.  General surgery was at bedside on admission.    2/11 pt's nausea is better.  No chest pain or sob.  She wants to eat.  She has chronic diarrhea.  She his pain in the vaginal area.  No focal neurologic complaints.  Some vague right sided abd discomfort    Plan of  Care:       Assessment:    Acute kidney injury due to volume depletion-  --continue volume resuscitation  --no nsaids, coxibs  --keep sbp above 90  --no acute indication for hd    Anemia  --epogen  --iron stores    Volume depletion due to gi losses  --continue iv fluids    Abdominal pain  --surgery consulted         Thank you for allowing us to participate in this patient's care. We will continue to follow.    Vital Signs:  Temp Readings from Last 3 Encounters:   02/11/20 98 °F (36.7 °C) (Oral)   02/05/20 98.3 °F (36.8 °C)   01/22/20 97.8 °F (36.6 °C)       Pulse Readings from Last 3 Encounters:   02/11/20 (!) 51   02/05/20 87   01/22/20 (!) 55       BP Readings from Last 3 Encounters:   02/11/20 (!) 108/57   02/05/20 112/64   01/22/20 (!) 84/51       Weight:  Wt Readings from Last 3 Encounters:   02/11/20 56.7 kg (125 lb)   02/05/20 56.9 kg (125 lb 6.4 oz)   01/22/20 57.2 kg (126 lb)       Past Medical & Surgical History:  Past Medical History:   Diagnosis Date    Chronic kidney disease, stage III (moderate) 1/7/2018    Diabetes mellitus, type 2     Fatigue     Hypertension     Iron deficiency anemia due to chronic blood loss 1/7/2018    Vulvar cancer 1/7/2019       Past Surgical History:   Procedure Laterality Date    ABDOMINAL SURGERY      BACK SURGERY      CERVICAL FUSION      x 4    cervix repair      CHOLECYSTECTOMY  2000    HYSTERECTOMY  1980    LUMBAR LAMINECTOMY      NECK SURGERY      SHOULDER SURGERY      vulva cancer         Past Social History:  Social History     Socioeconomic History    Marital status:      Spouse name: Not on file    Number of children: Not on file    Years of education: Not on file    Highest education level: Not on file   Occupational History    Not on file   Social Needs    Financial resource strain: Not on file    Food insecurity:     Worry: Not on file     Inability: Not on file    Transportation needs:     Medical: Not on file     Non-medical: Not  on file   Tobacco Use    Smoking status: Former Smoker     Packs/day: 1.00     Years: 33.00     Pack years: 33.00     Types: Cigarettes     Last attempt to quit: 2000     Years since quittin.5    Smokeless tobacco: Never Used   Substance and Sexual Activity    Alcohol use: No    Drug use: No    Sexual activity: Not on file   Lifestyle    Physical activity:     Days per week: Not on file     Minutes per session: Not on file    Stress: Not on file   Relationships    Social connections:     Talks on phone: Not on file     Gets together: Not on file     Attends Christianity service: Not on file     Active member of club or organization: Not on file     Attends meetings of clubs or organizations: Not on file     Relationship status: Not on file   Other Topics Concern    Not on file   Social History Narrative    Not on file       Medications:  No current facility-administered medications on file prior to encounter.      Current Outpatient Medications on File Prior to Encounter   Medication Sig Dispense Refill    calcitRIOL (ROCALTROL) 0.5 MCG Cap Take 1 capsule by mouth once daily.  3    dicyclomine (BENTYL) 10 MG capsule TAKE 1 CAPSULE THREE TIMES A DAY (Patient taking differently: Take 10 mg by mouth 3 (three) times daily. ) 270 capsule 4    diphenhydrAMINE (BENADRYL) 25 mg capsule Take 25 mg by mouth nightly as needed for Itching.      levothyroxine (SYNTHROID) 88 MCG tablet TAKE 1 TABLET DAILY (Patient taking differently: Take 88 mcg by mouth every morning. ) 90 tablet 4    magnesium oxide (MAG-OX) 400 mg (241.3 mg magnesium) tablet Take 400 mg by mouth 2 (two) times daily.      potassium chloride (KLOR-CON) 10 MEQ TbSR Take 20 mEq by mouth 2 (two) times daily.       promethazine (PHENERGAN) 25 MG tablet Take 1 tablet (25 mg total) by mouth daily as needed for Nausea. 90 tablet 0    sertraline (ZOLOFT) 25 MG tablet Take 25 mg by mouth once daily.      sodium bicarbonate 650 MG tablet Take 650  "mg by mouth 3 (three) times daily.   2    traMADol (ULTRAM) 50 mg tablet Take 1 tablet (50 mg total) by mouth every 4 (four) hours as needed for Pain. 42 tablet 0     Scheduled Meds:   enoxaparin  30 mg Subcutaneous Daily    levothyroxine  88 mcg Oral QAM    meropenem (MERREM) IVPB  500 mg Intravenous Q12H    polyethylene glycol  17 g Oral Daily    sertraline  25 mg Oral Daily    vancomycin (VANCOCIN) IVPB  20 mg/kg Intravenous Once     Continuous Infusions:   sodium chloride 0.9% 75 mL/hr at 02/10/20 1730     PRN Meds:.acetaminophen, acetaminophen, albuterol-ipratropium, HYDROmorphone, melatonin, ondansetron, oxyCODONE-acetaminophen, promethazine (PHENERGAN) IVPB, sodium chloride 0.9%, Pharmacy to dose Vancomycin consult **AND** vancomycin - pharmacy to dose    Allergies:  Ciprofloxacin and Pcn [penicillins]    Past Family History:  Reviewed; refer to Hospitalist Admission Note    Review of Systems:  Review of Systems - All 14 systems reviewed and negative, except as noted in HPI    Physical Exam:    BP (!) 108/57 (BP Location: Right arm, Patient Position: Lying)   Pulse (!) 51   Temp 98 °F (36.7 °C) (Oral)   Resp 20   Ht 5' 7" (1.702 m)   Wt 56.7 kg (125 lb)   SpO2 100%   Breastfeeding? No   BMI 19.58 kg/m²     General Appearance:    Alert, cooperative, no distress, appears stated age   Head:    Normocephalic, without obvious abnormality, atraumatic   Eyes:    PER, conjunctiva/corneas clear, EOM's intact in both eyes        Throat:   Lips, mucosa, and tongue normal; teeth and gums normal   Back:     Symmetric, no curvature, ROM normal, no CVA tenderness   Lungs:     Clear to auscultation bilaterally, respirations unlabored   Chest wall:    No tenderness or deformity   Heart:    Regular rate and rhythm, S1 and S2 normal, no murmur, rub   or gallop   Abdomen:     Soft, non-tender, bowel sounds active all four quadrants,     no masses, no organomegaly   Extremities:   Extremities normal, atraumatic, " no cyanosis or edema   Pulses:   2+ and symmetric all extremities   MSK:   No joint or muscle swelling, tenderness or deformity   Skin:   Skin color, texture, turgor normal, no rashes or lesions   Neurologic:   CNII-XII intact, normal strength and sensation       Throughout.  No flap     Results:  Lab Results   Component Value Date     02/11/2020     02/11/2020    K 3.8 02/11/2020    K 3.8 02/11/2020     (H) 02/11/2020     (H) 02/11/2020    CO2 18 (L) 02/11/2020    CO2 18 (L) 02/11/2020    BUN 23 02/11/2020    BUN 23 02/11/2020    CREATININE 2.7 (H) 02/11/2020    CREATININE 2.7 (H) 02/11/2020    CALCIUM 7.9 (L) 02/11/2020    CALCIUM 7.9 (L) 02/11/2020    ANIONGAP 8 02/11/2020    ANIONGAP 8 02/11/2020    ESTGFRAFRICA 20.0 (A) 02/11/2020    ESTGFRAFRICA 20.0 (A) 02/11/2020    EGFRNONAA 17.3 (A) 02/11/2020    EGFRNONAA 17.3 (A) 02/11/2020       Lab Results   Component Value Date    CALCIUM 7.9 (L) 02/11/2020    CALCIUM 7.9 (L) 02/11/2020    PHOS 2.8 02/11/2020    PHOS 2.8 02/11/2020       Recent Labs   Lab 02/11/20  0539   WBC 5.07  5.07   RBC 2.53*  2.53*   HGB 8.2*  8.2*   HCT 26.0*  26.0*   *  142*   *  103*   MCH 32.4*  32.4*   MCHC 31.5*  31.5*       I have personally reviewed pertinent radiological imaging and reports.

## 2020-02-11 NOTE — PLAN OF CARE
02/11/20 1143   HAND Message   Medicare Outpatient and Observation Notification regarding financial responsibility Given to patient/caregiver;Explained to patient/caregiver;Signed/date by patient/caregiver   Date HAND was signed 02/11/20   Time HAND was signed [id number]     SW met with Pt at bedside to discuss observation status.  HAND Form explained and signed by Pt.  Copy and information sheet left at bedside, and original will be scanned to chart.  Pt verbalized no further questions at this time.

## 2020-02-11 NOTE — HOSPITAL COURSE
Patient was admitted for concerns of pneumatosis intestinalis seen on CT renal stone study.  However patient's exam was benign.  Patient was also found to have possible cellulitis versus recurrence of vulvar cancer.  Patient was started on IV antibiotics with improved symptoms around the vulvar area.  However continued to appear cancers in nature.  Patient was transitioned to p.o. antibiotics.  Repeat imaging had concerns for pneumatosis intestinalis and patient did endorse having some abdominal pain around the area of concern on imaging.  Patient had a exploratory laparotomy and had adhesions lysed, vascular port removed, and created an ileostomy.  Patient tolerated procedure well.  Patient's symptoms resolved during hospitalization.  Patient was stable for discharge to home with home health and instructions follow up with General surgery, Nephrology, Oncology.    General: Patient resting comfortably in no acute distress. Appears as stated age. Calm. Thin appearing  Eyes: EOM intact. No conjunctivae injection. No scleral icterus.  ENT: Hearing grossly intact. No discharge from ears. No nasal discharge.   CVS: RRR. No LE edema BL.  Lungs: CTA BL, no wheezing or crackles. Good breath sounds. No accessory muscle use. No acute respiratory distress  Ab: s/p ileostomy  Neuro: AOx3. GCS 15. Cranial nerves grossly intact. Moves all extremities equally. Follows commands. Responds appropriately

## 2020-02-11 NOTE — PROGRESS NOTES
[facility] Medicine  Progress Note    Patient Name: Shara Hutchins  MRN: [id number]  Patient Class: OP- Observation   Admission Date: 2/10/2020  Length of Stay: 0 days  Attending Physician: Grant Ravi MD  Primary Care Provider: April Horton MD        Subjective:     Principal Problem:Cellulitis    Interval History:  Patient reports feeling well.  No abdominal pain however has not eaten anything but clear liquids.  Patient reports that she has pain when she eats a diet.  Abdomen currently benign.  Afebrile.  Hemodynamically stable.  No leukocytosis.  Continues to be IV antibiotics for possible cellulitis.  Renal function improving on IV fluids.  Stool cultures unremarkable.  Oncology will see patient today.    Review of Systems   Constitutional: Negative for chills, fatigue, fever and unexpected weight change.   HENT: Negative for sore throat.    Eyes: Negative for visual disturbance.   Respiratory: Negative for cough, chest tightness and shortness of breath.    Cardiovascular: Negative for chest pain.   Gastrointestinal: Negative for abdominal pain, constipation, diarrhea, nausea and vomiting.   Endocrine: Negative for polyuria.   Genitourinary: Negative for dysuria and hematuria.   Neurological: Negative for headaches.     Objective:     Vital Signs (Most Recent):  Temp: 98 °F (36.7 °C) (02/11/20 1129)  Pulse: (!) 51 (02/11/20 1129)  Resp: 20 (02/11/20 1129)  BP: (!) 108/57 (02/11/20 1129)  SpO2: 100 % (02/11/20 1129) Vital Signs (24h Range):  Temp:  [98 °F (36.7 °C)-98.4 °F (36.9 °C)] 98 °F (36.7 °C)  Pulse:  [51-98] 51  Resp:  [11-20] 20  SpO2:  [92 %-100 %] 100 %  BP: (102-138)/(50-74) 108/57     Weight: 56.7 kg (125 lb)  Body mass index is 19.58 kg/m².    Intake/Output Summary (Last 24 hours) at 2/11/2020 1218  Last data filed at 2/11/2020 0856  Gross per 24 hour   Intake 1990 ml   Output 401 ml   Net 1589 ml      Physical Exam   Constitutional: She is oriented to person, place,  and time. No distress.   Thin appearing   HENT:   Right Ear: External ear normal.   Left Ear: External ear normal.   Eyes: Conjunctivae and EOM are normal. Right eye exhibits no discharge. Left eye exhibits no discharge. No scleral icterus.   Neck: Normal range of motion.   Cardiovascular: Normal rate, regular rhythm, normal heart sounds and intact distal pulses. Exam reveals no gallop and no friction rub.   No murmur heard.  Pulmonary/Chest: Effort normal and breath sounds normal. No respiratory distress. She has no wheezes. She has no rales. She exhibits no tenderness.   Abdominal: Soft. Bowel sounds are normal. She exhibits no distension. There is no tenderness. There is no guarding.   Genitourinary: Pelvic exam was performed with patient prone. There is tenderness on the right labia. There is tenderness on the left labia.   Genitourinary Comments: Mildly erythematous, extremely tender around vulvar area   Musculoskeletal: Normal range of motion. She exhibits no edema or tenderness.   Neurological: She is alert and oriented to person, place, and time.   Skin: Skin is warm. She is not diaphoretic.   Psychiatric: She has a normal mood and affect. Her behavior is normal. Judgment and thought content normal.   Nursing note and vitals reviewed.      Significant Labs:   CBC:   Recent Labs   Lab 02/10/20  1336 02/11/20  0539   WBC 7.55 5.07  5.07   HGB 10.2* 8.2*  8.2*   HCT 32.1* 26.0*  26.0*    142*  142*     CMP:   Recent Labs   Lab 02/10/20  1336 02/11/20  0539    141  141   K 3.2* 3.8  3.8   * 115*  115*   CO2 20* 18*  18*    61*  61*   BUN 23 23  23   CREATININE 3.0* 2.7*  2.7*   CALCIUM 8.5* 7.9*  7.9*   PROT 5.5*  --    ALBUMIN 3.4* 2.7*   BILITOT 0.8  --    ALKPHOS 117  --    AST 17  --    ALT 17  --    ANIONGAP 9 8  8   EGFRNONAA 15.3* 17.3*  17.3*           Assessment/Plan:      Active Hospital Problems    Diagnosis    *Cellulitis    Nausea & vomiting    Diarrhea     Acute renal failure superimposed on stage 5 chronic kidney disease, not on chronic dialysis    Hypothyroidism    Anemia of chronic disease    Hisotry of Vulvar cancer    Stage 5 chronic kidney disease not on chronic dialysis    Inflammatory bowel disease       * Cellulitis  With history of vulvar cancer, cannot rule out recurrence  Improving  IV Meropenem, vancomycin    Dr. Francis consulted  Dr. Lovell consulted  Dr. Dietz consulted  Appreciate consultant's      Nausea & vomiting  Concerns for pneumatosis intestinalis on CT, however abdomen is benign on admission  IVF, Antiemetics  Full liquid with supplementation  Stool, C diff neg    VTE Risk Mitigation (From admission, onward)         Ordered     enoxaparin injection 30 mg  Daily      02/10/20 1748     IP VTE HIGH RISK PATIENT  Once      02/10/20 1721                      Grant Ravi MD  Department of Hospital Medicine   Formerly [facility], [facility]–1983  Date of service: 02/11/2020 12:21 PM

## 2020-02-12 ENCOUNTER — ANESTHESIA EVENT (OUTPATIENT)
Dept: SURGERY | Facility: HOSPITAL | Age: 70
DRG: 330 | End: 2020-02-12
Payer: MEDICARE

## 2020-02-12 ENCOUNTER — ANESTHESIA EVENT (OUTPATIENT)
Dept: MEDSURG UNIT | Facility: HOSPITAL | Age: 70
DRG: 330 | End: 2020-02-12
Payer: MEDICARE

## 2020-02-12 ENCOUNTER — ANESTHESIA (OUTPATIENT)
Dept: MEDSURG UNIT | Facility: HOSPITAL | Age: 70
DRG: 330 | End: 2020-02-12
Payer: MEDICARE

## 2020-02-12 LAB
ALBUMIN SERPL BCP-MCNC: 2.6 G/DL (ref 3.5–5.2)
ANION GAP SERPL CALC-SCNC: 8 MMOL/L (ref 8–16)
ANION GAP SERPL CALC-SCNC: 8 MMOL/L (ref 8–16)
BASOPHILS # BLD AUTO: 0 K/UL (ref 0–0.2)
BASOPHILS NFR BLD: 0 % (ref 0–1.9)
BUN SERPL-MCNC: 22 MG/DL (ref 8–23)
BUN SERPL-MCNC: 22 MG/DL (ref 8–23)
CALCIUM SERPL-MCNC: 7.9 MG/DL (ref 8.7–10.5)
CALCIUM SERPL-MCNC: 7.9 MG/DL (ref 8.7–10.5)
CHLORIDE SERPL-SCNC: 119 MMOL/L (ref 95–110)
CHLORIDE SERPL-SCNC: 119 MMOL/L (ref 95–110)
CO2 SERPL-SCNC: 16 MMOL/L (ref 23–29)
CO2 SERPL-SCNC: 16 MMOL/L (ref 23–29)
CREAT SERPL-MCNC: 2.7 MG/DL (ref 0.5–1.4)
CREAT SERPL-MCNC: 2.7 MG/DL (ref 0.5–1.4)
DIFFERENTIAL METHOD: ABNORMAL
EOSINOPHIL # BLD AUTO: 0.1 K/UL (ref 0–0.5)
EOSINOPHIL NFR BLD: 3.4 % (ref 0–8)
ERYTHROCYTE [DISTWIDTH] IN BLOOD BY AUTOMATED COUNT: 15.7 % (ref 11.5–14.5)
ERYTHROCYTE [DISTWIDTH] IN BLOOD BY AUTOMATED COUNT: 15.7 % (ref 11.5–14.5)
EST. GFR  (AFRICAN AMERICAN): 20 ML/MIN/1.73 M^2
EST. GFR  (AFRICAN AMERICAN): 20 ML/MIN/1.73 M^2
EST. GFR  (NON AFRICAN AMERICAN): 17.3 ML/MIN/1.73 M^2
EST. GFR  (NON AFRICAN AMERICAN): 17.3 ML/MIN/1.73 M^2
FERRITIN SERPL-MCNC: 270 NG/ML (ref 20–300)
FERRITIN SERPL-MCNC: 270 NG/ML (ref 20–300)
GLUCOSE SERPL-MCNC: 66 MG/DL (ref 70–110)
GLUCOSE SERPL-MCNC: 66 MG/DL (ref 70–110)
HBV CORE AB SERPL QL IA: NEGATIVE
HBV SURFACE AB SER QL: REACTIVE
HBV SURFACE AG SERPL QL IA: NEGATIVE
HCT VFR BLD AUTO: 26.4 % (ref 37–48.5)
HCT VFR BLD AUTO: 26.4 % (ref 37–48.5)
HGB BLD-MCNC: 8 G/DL (ref 12–16)
HGB BLD-MCNC: 8 G/DL (ref 12–16)
IMM GRANULOCYTES # BLD AUTO: 0.03 K/UL (ref 0–0.04)
IMM GRANULOCYTES NFR BLD AUTO: 0.7 % (ref 0–0.5)
IRON SERPL-MCNC: 29 UG/DL (ref 30–160)
LYMPHOCYTES # BLD AUTO: 0.6 K/UL (ref 1–4.8)
LYMPHOCYTES NFR BLD: 15.3 % (ref 18–48)
MAGNESIUM SERPL-MCNC: 1.7 MG/DL (ref 1.6–2.6)
MCH RBC QN AUTO: 31.1 PG (ref 27–31)
MCH RBC QN AUTO: 31.1 PG (ref 27–31)
MCHC RBC AUTO-ENTMCNC: 30.3 G/DL (ref 32–36)
MCHC RBC AUTO-ENTMCNC: 30.3 G/DL (ref 32–36)
MCV RBC AUTO: 103 FL (ref 82–98)
MCV RBC AUTO: 103 FL (ref 82–98)
MONOCYTES # BLD AUTO: 0.3 K/UL (ref 0.3–1)
MONOCYTES NFR BLD: 6.5 % (ref 4–15)
NEUTROPHILS # BLD AUTO: 3.1 K/UL (ref 1.8–7.7)
NEUTROPHILS NFR BLD: 74.1 % (ref 38–73)
NRBC BLD-RTO: 0 /100 WBC
PHOSPHATE SERPL-MCNC: 3.3 MG/DL (ref 2.7–4.5)
PHOSPHATE SERPL-MCNC: 3.3 MG/DL (ref 2.7–4.5)
PLATELET # BLD AUTO: 137 K/UL (ref 150–350)
PLATELET # BLD AUTO: 137 K/UL (ref 150–350)
PMV BLD AUTO: 11.2 FL (ref 9.2–12.9)
PMV BLD AUTO: 11.2 FL (ref 9.2–12.9)
POTASSIUM SERPL-SCNC: 3.9 MMOL/L (ref 3.5–5.1)
POTASSIUM SERPL-SCNC: 3.9 MMOL/L (ref 3.5–5.1)
RBC # BLD AUTO: 2.57 M/UL (ref 4–5.4)
RBC # BLD AUTO: 2.57 M/UL (ref 4–5.4)
SATURATED IRON: ABNORMAL % (ref 20–50)
SODIUM SERPL-SCNC: 143 MMOL/L (ref 136–145)
SODIUM SERPL-SCNC: 143 MMOL/L (ref 136–145)
TOTAL IRON BINDING CAPACITY: ABNORMAL UG/DL (ref 250–450)
TRANSFERRIN SERPL-MCNC: <70 MG/DL (ref 200–375)
VANCOMYCIN SERPL-MCNC: 18.1 UG/ML
VIT B12 SERPL-MCNC: 480 PG/ML (ref 210–950)
WBC # BLD AUTO: 4.17 K/UL (ref 3.9–12.7)
WBC # BLD AUTO: 4.17 K/UL (ref 3.9–12.7)

## 2020-02-12 PROCEDURE — 25000003 PHARM REV CODE 250: Performed by: STUDENT IN AN ORGANIZED HEALTH CARE EDUCATION/TRAINING PROGRAM

## 2020-02-12 PROCEDURE — G0378 HOSPITAL OBSERVATION PER HR: HCPCS

## 2020-02-12 PROCEDURE — 36415 COLL VENOUS BLD VENIPUNCTURE: CPT

## 2020-02-12 PROCEDURE — 25000003 PHARM REV CODE 250: Performed by: INTERNAL MEDICINE

## 2020-02-12 PROCEDURE — 25000003 PHARM REV CODE 250: Performed by: FAMILY MEDICINE

## 2020-02-12 PROCEDURE — 80202 ASSAY OF VANCOMYCIN: CPT

## 2020-02-12 PROCEDURE — 99900035 HC TECH TIME PER 15 MIN (STAT)

## 2020-02-12 PROCEDURE — 85025 COMPLETE CBC W/AUTO DIFF WBC: CPT

## 2020-02-12 PROCEDURE — 82728 ASSAY OF FERRITIN: CPT

## 2020-02-12 PROCEDURE — 83540 ASSAY OF IRON: CPT

## 2020-02-12 PROCEDURE — 80069 RENAL FUNCTION PANEL: CPT

## 2020-02-12 PROCEDURE — 84207 ASSAY OF VITAMIN B-6: CPT

## 2020-02-12 PROCEDURE — 83735 ASSAY OF MAGNESIUM: CPT

## 2020-02-12 PROCEDURE — 82607 VITAMIN B-12: CPT

## 2020-02-12 PROCEDURE — 63600175 PHARM REV CODE 636 W HCPCS: Performed by: INTERNAL MEDICINE

## 2020-02-12 PROCEDURE — 63600175 PHARM REV CODE 636 W HCPCS: Performed by: FAMILY MEDICINE

## 2020-02-12 RX ORDER — LIDOCAINE HYDROCHLORIDE 10 MG/ML
INJECTION, SOLUTION EPIDURAL; INFILTRATION; INTRACAUDAL; PERINEURAL
Status: COMPLETED | OUTPATIENT
Start: 2020-02-12 | End: 2020-02-12

## 2020-02-12 RX ORDER — VANCOMYCIN HCL IN 5 % DEXTROSE 1G/250ML
1000 PLASTIC BAG, INJECTION (ML) INTRAVENOUS ONCE
Status: COMPLETED | OUTPATIENT
Start: 2020-02-13 | End: 2020-02-14

## 2020-02-12 RX ADMIN — OXYCODONE AND ACETAMINOPHEN 1 TABLET: 7.5; 325 TABLET ORAL at 04:02

## 2020-02-12 RX ADMIN — SERTRALINE HYDROCHLORIDE 25 MG: 25 TABLET ORAL at 10:02

## 2020-02-12 RX ADMIN — ENOXAPARIN SODIUM 30 MG: 100 INJECTION SUBCUTANEOUS at 04:02

## 2020-02-12 RX ADMIN — LIDOCAINE HYDROCHLORIDE 5 MG: 10 INJECTION, SOLUTION EPIDURAL; INFILTRATION; INTRACAUDAL; PERINEURAL at 10:02

## 2020-02-12 RX ADMIN — ONDANSETRON HYDROCHLORIDE 4 MG: 2 INJECTION, SOLUTION INTRAMUSCULAR; INTRAVENOUS at 11:02

## 2020-02-12 RX ADMIN — MEROPENEM 500 MG: 500 INJECTION, POWDER, FOR SOLUTION INTRAVENOUS at 06:02

## 2020-02-12 RX ADMIN — OXYCODONE AND ACETAMINOPHEN 1 TABLET: 7.5; 325 TABLET ORAL at 10:02

## 2020-02-12 RX ADMIN — HYDROMORPHONE HYDROCHLORIDE 0.5 MG: 1 INJECTION, SOLUTION INTRAMUSCULAR; INTRAVENOUS; SUBCUTANEOUS at 11:02

## 2020-02-12 RX ADMIN — LEVOTHYROXINE SODIUM 88 MCG: 88 TABLET ORAL at 06:02

## 2020-02-12 NOTE — PROGRESS NOTES
[facility] Medicine  Progress Note    Patient Name: Shara Hutchins  MRN: [id number]  Patient Class: OP- Observation   Admission Date: 2/10/2020  Length of Stay: 0 days  Attending Physician: Grant Ravi MD  Primary Care Provider: April Horton MD        Subjective:     Principal Problem:Cellulitis    Interval History:  Patient tolerated full diet today without nausea vomiting.  Endorses having right lower quadrant abdominal pain, area of concern on CT abdomen.  Denies any fever or chills.  Ambulating around the room without difficulty.  Repeat CT abdomen continues to have concerns for pneumatosis intestinalis    Review of Systems   Constitutional: Negative for chills, fatigue, fever and unexpected weight change.   HENT: Negative for sore throat.    Eyes: Negative for visual disturbance.   Respiratory: Negative for cough, chest tightness and shortness of breath.    Cardiovascular: Negative for chest pain.   Gastrointestinal: Negative for abdominal pain, constipation, diarrhea, nausea and vomiting.   Endocrine: Negative for polyuria.   Genitourinary: Negative for dysuria and hematuria.   Neurological: Negative for headaches.     Objective:     Vital Signs (Most Recent):  Temp: 98.5 °F (36.9 °C) (02/12/20 1147)  Pulse: 66 (02/12/20 1147)  Resp: 17 (02/12/20 1147)  BP: 132/80 (02/12/20 1147)  SpO2: 97 % (02/12/20 1147) Vital Signs (24h Range):  Temp:  [98.1 °F (36.7 °C)-98.5 °F (36.9 °C)] 98.5 °F (36.9 °C)  Pulse:  [58-70] 66  Resp:  [16-18] 17  SpO2:  [96 %-98 %] 97 %  BP: (102-132)/(55-80) 132/80     Weight: 56.7 kg (125 lb)  Body mass index is 19.58 kg/m².  No intake or output data in the 24 hours ending 02/12/20 1514     Physical Exam   Constitutional: She is oriented to person, place, and time. No distress.   Thin appearing   HENT:   Right Ear: External ear normal.   Left Ear: External ear normal.   Eyes: Conjunctivae and EOM are normal. Right eye exhibits no discharge. Left eye exhibits  no discharge. No scleral icterus.   Neck: Normal range of motion.   Cardiovascular: Normal rate, regular rhythm, normal heart sounds and intact distal pulses. Exam reveals no gallop and no friction rub.   No murmur heard.  Pulmonary/Chest: Effort normal and breath sounds normal. No respiratory distress. She has no wheezes. She has no rales. She exhibits no tenderness.   Abdominal: Soft. Bowel sounds are normal. She exhibits no distension. There is no tenderness. There is no guarding.   Genitourinary: Pelvic exam was performed with patient prone. There is tenderness on the right labia. There is tenderness on the left labia.   Genitourinary Comments: Mildly erythematous, extremely tender around vulvar area   Musculoskeletal: Normal range of motion. She exhibits no edema or tenderness.   Neurological: She is alert and oriented to person, place, and time.   Skin: Skin is warm. She is not diaphoretic.   Psychiatric: She has a normal mood and affect. Her behavior is normal. Judgment and thought content normal.   Nursing note and vitals reviewed.      Significant Labs:   CBC:   Recent Labs   Lab 02/11/20  0539 02/12/20  0601   WBC 5.07  5.07 4.17  4.17   HGB 8.2*  8.2* 8.0*  8.0*   HCT 26.0*  26.0* 26.4*  26.4*   *  142* 137*  137*     CMP:   Recent Labs   Lab 02/11/20  0539 02/12/20  0601     141 143  143   K 3.8  3.8 3.9  3.9   *  115* 119*  119*   CO2 18*  18* 16*  16*   GLU 61*  61* 66*  66*   BUN 23  23 22  22   CREATININE 2.7*  2.7* 2.7*  2.7*   CALCIUM 7.9*  7.9* 7.9*  7.9*   ALBUMIN 2.7* 2.6*   ANIONGAP 8  8 8  8   EGFRNONAA 17.3*  17.3* 17.3*  17.3*       Significant Imaging:     CT Abdomen Pelvis Without Contrast [[id number]] Resulted: 02/12/20 0848   Order Status: Completed Updated: 02/12/20 0851   Narrative:       CMS MANDATED QUALITY DATA - CT RADIATION - 436    All CT scans at this facility utilize dose modulation, iterative reconstruction, and/or weight based  dosing when appropriate to reduce radiation dose to as low as reasonably achievable.    EXAMINATION:  CT ABDOMEN PELVIS WITHOUT CONTRAST    CLINICAL HISTORY:  Abd pain, gastroenteritis or colitis suspected;    TECHNIQUE:  CT abdomen and pelvis without IV or oral contrast. Study is tailored for detection of urinary tract calculi and evaluation of solid organs, hollow viscera, and vascular structures is limited.    COMPARISON:  CT 02/10/2020.    FINDINGS:  The lung bases are clear.  The heart is normal size.  There is redemonstration of diffuse hypoattenuation of the liver, consistent with panic steatosis.  No hepatic lesions identified.  No portal venous gas.  The gallbladder has been removed.  The pancreas is atrophic.  No pancreatic lesions identified.  The spleen and adrenal glands are unremarkable.    There is redemonstration of multiple bilateral nonobstructing renal calculi.  No hydronephrosis.  The ureters are normal caliber.  The bladder is fluid filled with no focal wall abnormalities.    Postsurgical changes of the large bowel again demonstrated.  The large bowel is fluid distended with multiple air-fluid levels identified.  The previously described stippled gas along the posterior wall of the ascending colon is unchanged.  The small bowel is fluid distended, with loops measuring up to 3 cm.  The stomach is grossly unremarkable.    Aortic atherosclerosis again demonstrated.  No and not the identified.  No mesenteric fat stranding or free fluid.    No acute osseous abnormality.  Diffuse anasarca is again noted.   Impression:       1. The previously described stippled gas along the posterior wall of the ascending colon, possibly reflecting pneumatosis intestinalis is unchanged.  No portal venous gas identified.  2. The fluid distension of the large and small bowel, consistent with diarrhea.  3. Hepatic steatosis.  4. Nephrolithiasis.           Assessment/Plan:      Active Hospital Problems    Diagnosis     *Cellulitis    Nausea & vomiting    Diarrhea    Acute renal failure superimposed on stage 5 chronic kidney disease, not on chronic dialysis    Hypothyroidism    Anemia of chronic disease    Hisotry of Vulvar cancer    Stage 5 chronic kidney disease not on chronic dialysis    Inflammatory bowel disease       * Cellulitis  With history of vulvar cancer, cannot rule out recurrence  Improving  IV Meropenem, vancomycin  After d/c oncology will refer to Dr. Aldana, patient's previous oncologist who treated her previously    Dr. Francis consulted  Dr. Lovell consulted  Dr. Dietz consulted  Appreciate consultant's      Nausea & vomiting  Concerns for pneumatosis intestinalis on CT, however abdomen is benign on admission  IVF, Antiemetics  Tolerated regular diet  Stool, C diff neg    VTE Risk Mitigation (From admission, onward)         Ordered     enoxaparin injection 30 mg  Daily      02/10/20 1748     IP VTE HIGH RISK PATIENT  Once      02/10/20 1721                      Grant Ravi MD  Department of Hospital Medicine   [facility]  Date of service: 02/12/2020 3:24 PM

## 2020-02-12 NOTE — PLAN OF CARE
02/12/20 0938   Discharge Assessment   Assessment Type Discharge Planning Assessment   Confirmed/corrected address and phone number on facesheet? Yes   Assessment information obtained from? Patient   Communicated expected length of stay with patient/caregiver no   Prior to hospitilization cognitive status: Alert/Oriented   Prior to hospitalization functional status: Independent   Current cognitive status: Alert/Oriented   Current Functional Status: Independent   Lives With alone   Able to Return to Prior Arrangements yes   Is patient able to care for self after discharge? Yes   Who are your caregiver(s) and their phone number(s)? Julieta Chang [facility] [phone]   Patient's perception of discharge disposition home or selfcare   Readmission Within the Last 30 Days no previous admission in last 30 days   Patient currently being followed by outpatient case management? Yes   If yes, name of outpatient case management following: insurance company assigned oupatient case management   Patient currently receives any other outside agency services? No   Equipment Currently Used at Home none   Do you have any problems affording any of your prescribed medications? No   Is the patient taking medications as prescribed? yes   Does the patient have transportation home? Yes   Transportation Anticipated family or friend will provide   Does the patient receive services at the [facility]? No   Discharge Plan A Home   DME Needed Upon Discharge  none   Patient/Family in Agreement with Plan yes       Introduced self to patient asked if ok to take a few min of their time for short interview for D/C planning and ok with patient

## 2020-02-12 NOTE — SUBJECTIVE & OBJECTIVE
Interval History:  Patient tolerated full diet today without nausea vomiting.  Endorses having right lower quadrant abdominal pain, area of concern on CT abdomen.  Denies any fever or chills.  Ambulating around the room without difficulty.  Repeat CT abdomen continues to have concerns for pneumatosis intestinalis    Review of Systems   Constitutional: Negative for chills, fatigue, fever and unexpected weight change.   HENT: Negative for sore throat.    Eyes: Negative for visual disturbance.   Respiratory: Negative for cough, chest tightness and shortness of breath.    Cardiovascular: Negative for chest pain.   Gastrointestinal: Negative for abdominal pain, constipation, diarrhea, nausea and vomiting.   Endocrine: Negative for polyuria.   Genitourinary: Negative for dysuria and hematuria.   Neurological: Negative for headaches.     Objective:     Vital Signs (Most Recent):  Temp: 98.5 °F (36.9 °C) (02/12/20 1147)  Pulse: 66 (02/12/20 1147)  Resp: 17 (02/12/20 1147)  BP: 132/80 (02/12/20 1147)  SpO2: 97 % (02/12/20 1147) Vital Signs (24h Range):  Temp:  [98.1 °F (36.7 °C)-98.5 °F (36.9 °C)] 98.5 °F (36.9 °C)  Pulse:  [58-70] 66  Resp:  [16-18] 17  SpO2:  [96 %-98 %] 97 %  BP: (102-132)/(55-80) 132/80     Weight: 56.7 kg (125 lb)  Body mass index is 19.58 kg/m².  No intake or output data in the 24 hours ending 02/12/20 1514     Physical Exam   Constitutional: She is oriented to person, place, and time. No distress.   Thin appearing   HENT:   Right Ear: External ear normal.   Left Ear: External ear normal.   Eyes: Conjunctivae and EOM are normal. Right eye exhibits no discharge. Left eye exhibits no discharge. No scleral icterus.   Neck: Normal range of motion.   Cardiovascular: Normal rate, regular rhythm, normal heart sounds and intact distal pulses. Exam reveals no gallop and no friction rub.   No murmur heard.  Pulmonary/Chest: Effort normal and breath sounds normal. No respiratory distress. She has no wheezes. She  has no rales. She exhibits no tenderness.   Abdominal: Soft. Bowel sounds are normal. She exhibits no distension. There is no tenderness. There is no guarding.   Genitourinary: Pelvic exam was performed with patient prone. There is tenderness on the right labia. There is tenderness on the left labia.   Genitourinary Comments: Mildly erythematous, extremely tender around vulvar area   Musculoskeletal: Normal range of motion. She exhibits no edema or tenderness.   Neurological: She is alert and oriented to person, place, and time.   Skin: Skin is warm. She is not diaphoretic.   Psychiatric: She has a normal mood and affect. Her behavior is normal. Judgment and thought content normal.   Nursing note and vitals reviewed.      Significant Labs:   CBC:   Recent Labs   Lab 02/11/20  0539 02/12/20  0601   WBC 5.07  5.07 4.17  4.17   HGB 8.2*  8.2* 8.0*  8.0*   HCT 26.0*  26.0* 26.4*  26.4*   *  142* 137*  137*     CMP:   Recent Labs   Lab 02/11/20  0539 02/12/20  0601     141 143  143   K 3.8  3.8 3.9  3.9   *  115* 119*  119*   CO2 18*  18* 16*  16*   GLU 61*  61* 66*  66*   BUN 23  23 22  22   CREATININE 2.7*  2.7* 2.7*  2.7*   CALCIUM 7.9*  7.9* 7.9*  7.9*   ALBUMIN 2.7* 2.6*   ANIONGAP 8  8 8  8   EGFRNONAA 17.3*  17.3* 17.3*  17.3*       Significant Imaging:     CT Abdomen Pelvis Without Contrast [[id number]] Resulted: 02/12/20 0848   Order Status: Completed Updated: 02/12/20 0851   Narrative:       CMS MANDATED QUALITY DATA - CT RADIATION - 436    All CT scans at this facility utilize dose modulation, iterative reconstruction, and/or weight based dosing when appropriate to reduce radiation dose to as low as reasonably achievable.    EXAMINATION:  CT ABDOMEN PELVIS WITHOUT CONTRAST    CLINICAL HISTORY:  Abd pain, gastroenteritis or colitis suspected;    TECHNIQUE:  CT abdomen and pelvis without IV or oral contrast. Study is tailored for detection of urinary tract calculi  and evaluation of solid organs, hollow viscera, and vascular structures is limited.    COMPARISON:  CT 02/10/2020.    FINDINGS:  The lung bases are clear.  The heart is normal size.  There is redemonstration of diffuse hypoattenuation of the liver, consistent with panic steatosis.  No hepatic lesions identified.  No portal venous gas.  The gallbladder has been removed.  The pancreas is atrophic.  No pancreatic lesions identified.  The spleen and adrenal glands are unremarkable.    There is redemonstration of multiple bilateral nonobstructing renal calculi.  No hydronephrosis.  The ureters are normal caliber.  The bladder is fluid filled with no focal wall abnormalities.    Postsurgical changes of the large bowel again demonstrated.  The large bowel is fluid distended with multiple air-fluid levels identified.  The previously described stippled gas along the posterior wall of the ascending colon is unchanged.  The small bowel is fluid distended, with loops measuring up to 3 cm.  The stomach is grossly unremarkable.    Aortic atherosclerosis again demonstrated.  No and not the identified.  No mesenteric fat stranding or free fluid.    No acute osseous abnormality.  Diffuse anasarca is again noted.   Impression:       1. The previously described stippled gas along the posterior wall of the ascending colon, possibly reflecting pneumatosis intestinalis is unchanged.  No portal venous gas identified.  2. The fluid distension of the large and small bowel, consistent with diarrhea.  3. Hepatic steatosis.  4. Nephrolithiasis.

## 2020-02-12 NOTE — PLAN OF CARE
02/11/20 1907   Patient Assessment/Suction   Level of Consciousness (AVPU) alert   Respiratory Effort Normal;Unlabored   Expansion/Accessory Muscles/Retractions no use of accessory muscles   All Lung Fields Breath Sounds Anterior:;clear;diminished   Rhythm/Pattern, Respiratory unlabored   PRE-TX-O2   O2 Device (Oxygen Therapy) room air   SpO2 98 %   Pulse (!) 58   Aerosol Therapy   $ Aerosol Therapy Charges PRN treatment not required   Daily Review of Necessity (SVN) completed   Respiratory Treatment Status (SVN) PRN treatment not required

## 2020-02-12 NOTE — ASSESSMENT & PLAN NOTE
With history of vulvar cancer, cannot rule out recurrence  Improving  IV Meropenem, vancomycin  After d/c oncology will refer to Dr. Aldana, patient's previous oncologist who treated her previously    Dr. Francis consulted  Dr. Lovell consulted  Dr. Dietz consulted  Appreciate consultant's

## 2020-02-12 NOTE — PROGRESS NOTES
[facility]  General Surgery  Progress Note    Subjective:     Interval History: Patient feeling little better today. No acute events overnight. She is tolerating diet. WBC 5. Abdominal Xray unremarkable     Post-Op Info:  * No surgery found *          Medications:  Continuous Infusions:  Scheduled Meds:   enoxaparin  30 mg Subcutaneous Daily    levothyroxine  88 mcg Oral QAM    meropenem (MERREM) IVPB  500 mg Intravenous Q12H    polyethylene glycol  17 g Oral Daily    sertraline  25 mg Oral Daily    vancomycin (VANCOCIN) IVPB  20 mg/kg Intravenous Once    vancomycin (VANCOCIN) IVPB  1,000 mg Intravenous Once     PRN Meds:acetaminophen, acetaminophen, albuterol-ipratropium, HYDROmorphone, melatonin, ondansetron, oxyCODONE-acetaminophen, promethazine (PHENERGAN) IVPB, sodium chloride 0.9%, Pharmacy to dose Vancomycin consult **AND** vancomycin - pharmacy to dose     Objective:     Vital Signs (Most Recent):  Temp: 98.3 °F (36.8 °C) (02/11/20 1947)  Pulse: 69 (02/11/20 1947)  Resp: 18 (02/11/20 1947)  BP: (!) 126/55 (02/11/20 1947)  SpO2: 98 % (02/11/20 1947) Vital Signs (24h Range):  Temp:  [98 °F (36.7 °C)-98.4 °F (36.9 °C)] 98.3 °F (36.8 °C)  Pulse:  [51-82] 69  Resp:  [16-20] 18  SpO2:  [92 %-100 %] 98 %  BP: (102-135)/(50-66) 126/55       Intake/Output Summary (Last 24 hours) at 2/11/2020 2125  Last data filed at 2/11/2020 1506  Gross per 24 hour   Intake 1080 ml   Output 401 ml   Net 679 ml       Physical Exam   Constitutional: Vital signs are normal. She is cooperative. No distress.   Pulmonary/Chest: Effort normal. No respiratory distress.   Abdominal: There is generalized tenderness. There is no rebound and no guarding.   Neurological: She is alert.       Significant Labs:  CBC:   Recent Labs   Lab 02/11/20  0539   WBC 5.07  5.07   RBC 2.53*  2.53*   HGB 8.2*  8.2*   HCT 26.0*  26.0*   *  142*   *  103*   MCH 32.4*  32.4*   MCHC 31.5*  31.5*     CMP:   Recent Labs    Lab 02/10/20  1336 02/11/20  0539    61*  61*   CALCIUM 8.5* 7.9*  7.9*   ALBUMIN 3.4* 2.7*   PROT 5.5*  --     141  141   K 3.2* 3.8  3.8   CO2 20* 18*  18*   * 115*  115*   BUN 23 23  23   CREATININE 3.0* 2.7*  2.7*   ALKPHOS 117  --    ALT 17  --    AST 17  --    BILITOT 0.8  --        Significant Diagnostics:  I have reviewed all pertinent imaging results/findings within the past 24 hours.    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 Physical exam similar to admit. Will observe again today. Will order repeat Ct abdomen to evaluate the colon tomorrow morning.      Robbi Lovell III, MD  General Surgery  [facility]

## 2020-02-12 NOTE — CONSULTS
[facility]  2/11/20  DIANA Estrada MD  Requested by Dr. Ravi      69 y/o female admitted with weakness, marked diarrhea, and dehydration.  She has hx of vulvar cancer, treated with local surgery, followed by concurrent Cisplatin and Rad Rx.  Seen intermittently per Dr. Aldana of G[facility].    Hx of chronic anemia 2nd B12 deficiency and CRI.  On B 12 and Retacrit at [facility].  In past, B 12 < 150.    She has a fistula in place at L arm, she is not on dialysis, Dr. Dietz.    Hx DM, HTN, peripheral neuropathy, IBS.    Hx wide excision of vulva area, cholecystectomy, hysterectomy, C spine fusion x's 4, back surgery x's 1, shoulder surgery.    Allergy PCN, Cipro.  Smoke 1 ppd x's 33 years, none since 2000.  ETOH no.    Mom heart dx, HTN, cancer.  Dad heart dx. Sister heart dx.    RENETTA: see HPI.    NAD, does not appear chronically ill.  LN NP.  Lungs clear L & R, no wheezes  RR no m  NT, L/S NP  Neuro grossly intact  Calf NT, edema no.  Fistula L arm with thrill  Vulvar area edematous, excoriated, denuded,with extensive post Rad Rx changes.    Blood C/S NG  Creat 2.7, GFR 17,  Hgb 8.2,  Hct 27, .    Abd x ray, CAT see reports.    Assessment:    Anemia 2nd B 12 deficiency hx, chronic renal Dx.  B12 1,000 mcg monthly.  Retacrit 20,000 units weekly.    Vulvar cancer hx.  After D/C, will try to return her to Dr. Aldana for exam.

## 2020-02-12 NOTE — PROGRESS NOTES
INPATIENT NEPHROLOGY CONSULT   [facility] NEPHROLOGY    Shara Hutchins  02/12/2020    Reason for consultation:    lawson    Chief Complaint:   Chief Complaint   Patient presents with    Diarrhea     does have blood in her diarrhea that is chronic    Urinary Retention     for 2 days     Nausea    Vomiting          History of Present Illness:      Per H and P    69-year-old female history of IBS-D (after cisplatin) having 10+ diarrhea episodes today, history of vulvar cancer s/p chemo/radiation, CKD 5 without dialysis with fistula, history of gangrenous colon and small bowel, hypothyroidism, anemia chronic disease comes in for nausea and vomiting and increased diarrhea.  Patient reports that she has been having diarrhea multiple times a day for several years.  Reports that last Friday she was in usual health when she some developed some nausea and nonbloody nonbilious emesis and increased diarrhea.  Denies any fever, chills, abdominal pain. Patient reports that she had decreased urine output over the weekend and has not had any significant output since Saturday night or Sunday morning.  Patient reports that her diarrhea denied improved however nausea somewhat improved.  Patient cannot tolerate anymore and came into the ED for further evaluation.  Patient also reports that she has been having worsening pain in arm volar area.  Reports that she had history of vulvar cancer in the past and she feels like it has returned.     In the ED, patient received Diflucan, meropenem, vancomycin in the ED.  Patient was afebrile, without leukocytosis.  Lactic acid was unremarkable.  CT abdomen found possible pneumatosis intestinalis however patient had benign abdominal exam.  General surgery was at bedside on admission.    2/11 pt's nausea is better.  No chest pain or sob.  She wants to eat.  She has chronic diarrhea.  She his pain in the vaginal area.  No focal neurologic complaints.  Some vague right sided abd discomfort    2/12  still with abdominal pain    Plan of Care:       Assessment:    Acute kidney injury due to volume depletion-  --continue volume resuscitation--needs tlc (lost peripheral)  --no nsaids, coxibs  --keep sbp above 90  --no acute indication for hd    Anemia  --epogen ordered       Volume depletion due to gi losses  --continue iv fluids    Abdominal pain  --surgery consulted--ex lap tomorrow         Thank you for allowing us to participate in this patient's care. We will continue to follow.    Vital Signs:  Temp Readings from Last 3 Encounters:   02/12/20 98.5 °F (36.9 °C)   02/05/20 98.3 °F (36.8 °C)   01/22/20 97.8 °F (36.6 °C)       Pulse Readings from Last 3 Encounters:   02/12/20 66   02/05/20 87   01/22/20 (!) 55       BP Readings from Last 3 Encounters:   02/12/20 132/80   02/05/20 112/64   01/22/20 (!) 84/51       Weight:  Wt Readings from Last 3 Encounters:   02/11/20 56.7 kg (125 lb)   02/05/20 56.9 kg (125 lb 6.4 oz)   01/22/20 57.2 kg (126 lb)       Past Medical & Surgical History:  Past Medical History:   Diagnosis Date    Chronic kidney disease, stage III (moderate) 1/7/2018    Diabetes mellitus, type 2     Fatigue     Hypertension     Iron deficiency anemia due to chronic blood loss 1/7/2018    Vulvar cancer 1/7/2019       Past Surgical History:   Procedure Laterality Date    ABDOMINAL SURGERY      BACK SURGERY      CERVICAL FUSION      x 4    cervix repair      CHOLECYSTECTOMY  2000    HYSTERECTOMY  1980    LUMBAR LAMINECTOMY      NECK SURGERY      SHOULDER SURGERY      vulva cancer         Past Social History:  Social History     Socioeconomic History    Marital status:      Spouse name: Not on file    Number of children: Not on file    Years of education: Not on file    Highest education level: Not on file   Occupational History    Not on file   Social Needs    Financial resource strain: Not on file    Food insecurity:     Worry: Not on file     Inability: Not on file     Transportation needs:     Medical: Not on file     Non-medical: Not on file   Tobacco Use    Smoking status: Former Smoker     Packs/day: 1.00     Years: 33.00     Pack years: 33.00     Types: Cigarettes     Last attempt to quit: 2000     Years since quittin.5    Smokeless tobacco: Never Used   Substance and Sexual Activity    Alcohol use: No    Drug use: No    Sexual activity: Not on file   Lifestyle    Physical activity:     Days per week: Not on file     Minutes per session: Not on file    Stress: Not on file   Relationships    Social connections:     Talks on phone: Not on file     Gets together: Not on file     Attends Sabianist service: Not on file     Active member of club or organization: Not on file     Attends meetings of clubs or organizations: Not on file     Relationship status: Not on file   Other Topics Concern    Not on file   Social History Narrative    Not on file       Medications:  No current facility-administered medications on file prior to encounter.      Current Outpatient Medications on File Prior to Encounter   Medication Sig Dispense Refill    calcitRIOL (ROCALTROL) 0.5 MCG Cap Take 1 capsule by mouth once daily.  3    dicyclomine (BENTYL) 10 MG capsule TAKE 1 CAPSULE THREE TIMES A DAY (Patient taking differently: Take 10 mg by mouth 3 (three) times daily. ) 270 capsule 4    diphenhydrAMINE (BENADRYL) 25 mg capsule Take 25 mg by mouth nightly as needed for Itching.      levothyroxine (SYNTHROID) 88 MCG tablet TAKE 1 TABLET DAILY (Patient taking differently: Take 88 mcg by mouth every morning. ) 90 tablet 4    magnesium oxide (MAG-OX) 400 mg (241.3 mg magnesium) tablet Take 400 mg by mouth 2 (two) times daily.      potassium chloride (KLOR-CON) 10 MEQ TbSR Take 20 mEq by mouth 2 (two) times daily.       promethazine (PHENERGAN) 25 MG tablet Take 1 tablet (25 mg total) by mouth daily as needed for Nausea. 90 tablet 0    sertraline (ZOLOFT) 25 MG tablet Take 25 mg  "by mouth once daily.      sodium bicarbonate 650 MG tablet Take 650 mg by mouth 3 (three) times daily.   2    traMADol (ULTRAM) 50 mg tablet Take 1 tablet (50 mg total) by mouth every 4 (four) hours as needed for Pain. 42 tablet 0     Scheduled Meds:   enoxaparin  30 mg Subcutaneous Daily    levothyroxine  88 mcg Oral QAM    meropenem (MERREM) IVPB  500 mg Intravenous Q12H    polyethylene glycol  17 g Oral Daily    sertraline  25 mg Oral Daily    vancomycin (VANCOCIN) IVPB  20 mg/kg Intravenous Once     Continuous Infusions:    PRN Meds:.acetaminophen, acetaminophen, albuterol-ipratropium, HYDROmorphone, melatonin, ondansetron, oxyCODONE-acetaminophen, promethazine (PHENERGAN) IVPB, sodium chloride 0.9%, Pharmacy to dose Vancomycin consult **AND** vancomycin - pharmacy to dose    Allergies:  Ciprofloxacin and Pcn [penicillins]    Past Family History:  Reviewed; refer to Hospitalist Admission Note    Review of Systems:  Review of Systems - All 14 systems reviewed and negative, except as noted in HPI    Physical Exam:    /80   Pulse 66   Temp 98.5 °F (36.9 °C)   Resp 17   Ht 5' 7" (1.702 m)   Wt 56.7 kg (125 lb)   SpO2 97%   Breastfeeding? No   BMI 19.58 kg/m²     General Appearance:    Alert, cooperative, no distress, appears stated age   Head:    Normocephalic, without obvious abnormality, atraumatic   Eyes:    PER, conjunctiva/corneas clear, EOM's intact in both eyes        Throat:   Lips, mucosa, and tongue normal; teeth and gums normal   Back:     Symmetric, no curvature, ROM normal, no CVA tenderness   Lungs:     Clear to auscultation bilaterally, respirations unlabored   Chest wall:    No tenderness or deformity   Heart:    Regular rate and rhythm, S1 and S2 normal, no murmur, rub   or gallop   Abdomen:     Soft, non-tender, bowel sounds active all four quadrants,     no masses, no organomegaly   Extremities:   Extremities normal, atraumatic, no cyanosis or edema   Pulses:   2+ and " symmetric all extremities   MSK:   No joint or muscle swelling, tenderness or deformity   Skin:   Skin color, texture, turgor normal, no rashes or lesions   Neurologic:   CNII-XII intact, normal strength and sensation       Throughout.  No flap     Results:  Lab Results   Component Value Date     02/12/2020     02/12/2020    K 3.9 02/12/2020    K 3.9 02/12/2020     (H) 02/12/2020     (H) 02/12/2020    CO2 16 (L) 02/12/2020    CO2 16 (L) 02/12/2020    BUN 22 02/12/2020    BUN 22 02/12/2020    CREATININE 2.7 (H) 02/12/2020    CREATININE 2.7 (H) 02/12/2020    CALCIUM 7.9 (L) 02/12/2020    CALCIUM 7.9 (L) 02/12/2020    ANIONGAP 8 02/12/2020    ANIONGAP 8 02/12/2020    ESTGFRAFRICA 20.0 (A) 02/12/2020    ESTGFRAFRICA 20.0 (A) 02/12/2020    EGFRNONAA 17.3 (A) 02/12/2020    EGFRNONAA 17.3 (A) 02/12/2020       Lab Results   Component Value Date    CALCIUM 7.9 (L) 02/12/2020    CALCIUM 7.9 (L) 02/12/2020    PHOS 3.3 02/12/2020    PHOS 3.3 02/12/2020       Recent Labs   Lab 02/12/20  0601   WBC 4.17  4.17   RBC 2.57*  2.57*   HGB 8.0*  8.0*   HCT 26.4*  26.4*   *  137*   *  103*   MCH 31.1*  31.1*   MCHC 30.3*  30.3*       I have personally reviewed pertinent radiological imaging and reports.

## 2020-02-12 NOTE — ASSESSMENT & PLAN NOTE
Concerns for pneumatosis intestinalis on CT, however abdomen is benign on admission  IVF, Antiemetics  Tolerated regular diet  Stool, C diff neg

## 2020-02-12 NOTE — PROGRESS NOTES
[facility]  General Surgery  Progress Note    Subjective:     Interval History:  Patient states right lower quadrant pain little worse today.  She has had some nausea without vomiting.  She continues to have diarrhea as normal.  She has been afebrile.  She has been hemodynamically stable.  Repeat CT showed unchanged appearance of the pneumatosis in the cecum and ascending colon.  There was no portal venous gas or free air.      Post-Op Info:  * No surgery found *          Medications:  Continuous Infusions:  Scheduled Meds:   enoxaparin  30 mg Subcutaneous Daily    levothyroxine  88 mcg Oral QAM    meropenem (MERREM) IVPB  500 mg Intravenous Q12H    polyethylene glycol  17 g Oral Daily    sertraline  25 mg Oral Daily    vancomycin (VANCOCIN) IVPB  20 mg/kg Intravenous Once     PRN Meds:acetaminophen, acetaminophen, albuterol-ipratropium, HYDROmorphone, melatonin, ondansetron, oxyCODONE-acetaminophen, promethazine (PHENERGAN) IVPB, sodium chloride 0.9%, Pharmacy to dose Vancomycin consult **AND** vancomycin - pharmacy to dose     Objective:     Vital Signs (Most Recent):  Temp: 98.5 °F (36.9 °C) (02/12/20 1147)  Pulse: 66 (02/12/20 1147)  Resp: 17 (02/12/20 1147)  BP: 132/80 (02/12/20 1147)  SpO2: 97 % (02/12/20 1147) Vital Signs (24h Range):  Temp:  [98.1 °F (36.7 °C)-98.5 °F (36.9 °C)] 98.5 °F (36.9 °C)  Pulse:  [58-70] 66  Resp:  [16-18] 17  SpO2:  [96 %-98 %] 97 %  BP: (102-132)/(55-80) 132/80     No intake or output data in the 24 hours ending 02/12/20 1658    Physical Exam   Constitutional: Vital signs are normal. She is cooperative. No distress.   Pulmonary/Chest: Effort normal. No respiratory distress.   Abdominal: Soft. There is generalized tenderness. There is no rebound and no guarding.   abd is diffusely tender, little worse in the RLQ    Neurological: She is alert.       Significant Labs:  CBC:   Recent Labs   Lab 02/12/20  0601   WBC 4.17  4.17   RBC 2.57*  2.57*   HGB 8.0*  8.0*    HCT 26.4*  26.4*   *  137*   *  103*   MCH 31.1*  31.1*   MCHC 30.3*  30.3*     CMP:   Recent Labs   Lab 02/12/20  0601   GLU 66*  66*   CALCIUM 7.9*  7.9*   ALBUMIN 2.6*     143   K 3.9  3.9   CO2 16*  16*   *  119*   BUN 22  22   CREATININE 2.7*  2.7*       Significant Diagnostics:  I have reviewed all pertinent imaging results/findings within the past 24 hours.    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  CT is unchanged showing pneumatosis in the cecum and ascending colon.  My recommendation is for diagnostic or exploratory laparoscopy and possible laparotomy.  We had long discussion about the risks and benefits of the surgery. This includes ileostomy or colostomy.  Will plan for surgery tomorrow.    Robbi Lovell III, MD  General Surgery  [facility]

## 2020-02-13 ENCOUNTER — ANESTHESIA (OUTPATIENT)
Dept: SURGERY | Facility: HOSPITAL | Age: 70
DRG: 330 | End: 2020-02-13
Payer: MEDICARE

## 2020-02-13 PROBLEM — K63.89 PNEUMATOSIS INTESTINALIS: Status: ACTIVE | Noted: 2020-02-13

## 2020-02-13 LAB
ANION GAP SERPL CALC-SCNC: 6 MMOL/L (ref 8–16)
BUN SERPL-MCNC: 21 MG/DL (ref 8–23)
CALCIUM SERPL-MCNC: 7.9 MG/DL (ref 8.7–10.5)
CHLORIDE SERPL-SCNC: 118 MMOL/L (ref 95–110)
CO2 SERPL-SCNC: 17 MMOL/L (ref 23–29)
CREAT SERPL-MCNC: 2.7 MG/DL (ref 0.5–1.4)
ERYTHROCYTE [DISTWIDTH] IN BLOOD BY AUTOMATED COUNT: 15.9 % (ref 11.5–14.5)
EST. GFR  (AFRICAN AMERICAN): 20 ML/MIN/1.73 M^2
EST. GFR  (NON AFRICAN AMERICAN): 17.3 ML/MIN/1.73 M^2
GLUCOSE SERPL-MCNC: 69 MG/DL (ref 70–110)
GLUCOSE SERPL-MCNC: 75 MG/DL (ref 70–110)
GLUCOSE SERPL-MCNC: 92 MG/DL (ref 70–110)
HCT VFR BLD AUTO: 26.8 % (ref 37–48.5)
HGB BLD-MCNC: 8.3 G/DL (ref 12–16)
MAGNESIUM SERPL-MCNC: 1.7 MG/DL (ref 1.6–2.6)
MCH RBC QN AUTO: 32.3 PG (ref 27–31)
MCHC RBC AUTO-ENTMCNC: 31 G/DL (ref 32–36)
MCV RBC AUTO: 104 FL (ref 82–98)
PHOSPHATE SERPL-MCNC: 3.8 MG/DL (ref 2.7–4.5)
PLATELET # BLD AUTO: 137 K/UL (ref 150–350)
PMV BLD AUTO: 11.4 FL (ref 9.2–12.9)
POTASSIUM SERPL-SCNC: 3.7 MMOL/L (ref 3.5–5.1)
RBC # BLD AUTO: 2.57 M/UL (ref 4–5.4)
SODIUM SERPL-SCNC: 141 MMOL/L (ref 136–145)
STOOL CULTURE: NORMAL
STOOL CULTURE: NORMAL
WBC # BLD AUTO: 4.62 K/UL (ref 3.9–12.7)

## 2020-02-13 PROCEDURE — 80048 BASIC METABOLIC PNL TOTAL CA: CPT

## 2020-02-13 PROCEDURE — 63600175 PHARM REV CODE 636 W HCPCS: Performed by: NURSE ANESTHETIST, CERTIFIED REGISTERED

## 2020-02-13 PROCEDURE — 27201107 HC STYLET, STANDARD: Performed by: STUDENT IN AN ORGANIZED HEALTH CARE EDUCATION/TRAINING PROGRAM

## 2020-02-13 PROCEDURE — 63600175 PHARM REV CODE 636 W HCPCS: Performed by: INTERNAL MEDICINE

## 2020-02-13 PROCEDURE — 71000039 HC RECOVERY, EACH ADD'L HOUR: Performed by: SURGERY

## 2020-02-13 PROCEDURE — 44310 PR ILEOSTOMY/JEJUNOSTOMY,NONTUBE: ICD-10-PCS | Mod: ,,, | Performed by: SURGERY

## 2020-02-13 PROCEDURE — 63600175 PHARM REV CODE 636 W HCPCS: Performed by: SURGERY

## 2020-02-13 PROCEDURE — 44310 ILEOSTOMY/JEJUNOSTOMY: CPT | Mod: ,,, | Performed by: SURGERY

## 2020-02-13 PROCEDURE — 99900035 HC TECH TIME PER 15 MIN (STAT)

## 2020-02-13 PROCEDURE — 27000675 HC TUBING MICRODRIP: Performed by: STUDENT IN AN ORGANIZED HEALTH CARE EDUCATION/TRAINING PROGRAM

## 2020-02-13 PROCEDURE — 37000009 HC ANESTHESIA EA ADD 15 MINS: Performed by: SURGERY

## 2020-02-13 PROCEDURE — 25000003 PHARM REV CODE 250: Performed by: STUDENT IN AN ORGANIZED HEALTH CARE EDUCATION/TRAINING PROGRAM

## 2020-02-13 PROCEDURE — 27201423 OPTIME MED/SURG SUP & DEVICES STERILE SUPPLY: Performed by: SURGERY

## 2020-02-13 PROCEDURE — 63600175 PHARM REV CODE 636 W HCPCS: Performed by: STUDENT IN AN ORGANIZED HEALTH CARE EDUCATION/TRAINING PROGRAM

## 2020-02-13 PROCEDURE — 71000033 HC RECOVERY, INTIAL HOUR: Performed by: SURGERY

## 2020-02-13 PROCEDURE — 37000008 HC ANESTHESIA 1ST 15 MINUTES: Performed by: SURGERY

## 2020-02-13 PROCEDURE — 25000003 PHARM REV CODE 250: Performed by: SURGERY

## 2020-02-13 PROCEDURE — 84100 ASSAY OF PHOSPHORUS: CPT

## 2020-02-13 PROCEDURE — C9290 INJ, BUPIVACAINE LIPOSOME: HCPCS | Performed by: SURGERY

## 2020-02-13 PROCEDURE — 83735 ASSAY OF MAGNESIUM: CPT

## 2020-02-13 PROCEDURE — 27202107 HC XP QUATRO SENSOR: Performed by: STUDENT IN AN ORGANIZED HEALTH CARE EDUCATION/TRAINING PROGRAM

## 2020-02-13 PROCEDURE — 85027 COMPLETE CBC AUTOMATED: CPT

## 2020-02-13 PROCEDURE — S0028 INJECTION, FAMOTIDINE, 20 MG: HCPCS | Performed by: NURSE ANESTHETIST, CERTIFIED REGISTERED

## 2020-02-13 PROCEDURE — 36000708 HC OR TIME LEV III 1ST 15 MIN: Performed by: SURGERY

## 2020-02-13 PROCEDURE — 12000002 HC ACUTE/MED SURGE SEMI-PRIVATE ROOM

## 2020-02-13 PROCEDURE — 25000003 PHARM REV CODE 250: Performed by: NURSE ANESTHETIST, CERTIFIED REGISTERED

## 2020-02-13 PROCEDURE — 27000673 HC TUBING BLOOD Y: Performed by: STUDENT IN AN ORGANIZED HEALTH CARE EDUCATION/TRAINING PROGRAM

## 2020-02-13 PROCEDURE — 27000080 OPTIME MED/SURG SUP & DEVICES GENERAL CLASSIFICATION: Performed by: SURGERY

## 2020-02-13 PROCEDURE — 63600175 PHARM REV CODE 636 W HCPCS: Performed by: FAMILY MEDICINE

## 2020-02-13 PROCEDURE — 94770 HC EXHALED C02 TEST: CPT

## 2020-02-13 PROCEDURE — 27000221 HC OXYGEN, UP TO 24 HOURS

## 2020-02-13 PROCEDURE — 36000709 HC OR TIME LEV III EA ADD 15 MIN: Performed by: SURGERY

## 2020-02-13 RX ORDER — GLYCOPYRROLATE 0.2 MG/ML
INJECTION INTRAMUSCULAR; INTRAVENOUS
Status: DISCONTINUED | OUTPATIENT
Start: 2020-02-13 | End: 2020-02-13

## 2020-02-13 RX ORDER — ACETAMINOPHEN 10 MG/ML
INJECTION, SOLUTION INTRAVENOUS
Status: DISCONTINUED | OUTPATIENT
Start: 2020-02-13 | End: 2020-02-13

## 2020-02-13 RX ORDER — PROPOFOL 10 MG/ML
VIAL (ML) INTRAVENOUS
Status: DISCONTINUED | OUTPATIENT
Start: 2020-02-13 | End: 2020-02-13

## 2020-02-13 RX ORDER — HYDROMORPHONE HCL IN 0.9% NACL 6 MG/30 ML
PATIENT CONTROLLED ANALGESIA SYRINGE INTRAVENOUS CONTINUOUS
Status: DISCONTINUED | OUTPATIENT
Start: 2020-02-13 | End: 2020-02-13

## 2020-02-13 RX ORDER — KETAMINE HYDROCHLORIDE 10 MG/ML
INJECTION, SOLUTION INTRAMUSCULAR; INTRAVENOUS
Status: DISCONTINUED | OUTPATIENT
Start: 2020-02-13 | End: 2020-02-13

## 2020-02-13 RX ORDER — MEPERIDINE HYDROCHLORIDE 50 MG/ML
12.5 INJECTION INTRAMUSCULAR; INTRAVENOUS; SUBCUTANEOUS EVERY 10 MIN PRN
Status: DISCONTINUED | OUTPATIENT
Start: 2020-02-13 | End: 2020-02-13 | Stop reason: HOSPADM

## 2020-02-13 RX ORDER — SODIUM CHLORIDE 0.9 % (FLUSH) 0.9 %
10 SYRINGE (ML) INJECTION
Status: DISCONTINUED | OUTPATIENT
Start: 2020-02-13 | End: 2020-02-17 | Stop reason: HOSPADM

## 2020-02-13 RX ORDER — NEOSTIGMINE METHYLSULFATE 1 MG/ML
INJECTION, SOLUTION INTRAVENOUS
Status: DISCONTINUED | OUTPATIENT
Start: 2020-02-13 | End: 2020-02-13

## 2020-02-13 RX ORDER — BUPIVACAINE HYDROCHLORIDE AND EPINEPHRINE 2.5; 5 MG/ML; UG/ML
INJECTION, SOLUTION EPIDURAL; INFILTRATION; INTRACAUDAL; PERINEURAL
Status: DISCONTINUED | OUTPATIENT
Start: 2020-02-13 | End: 2020-02-13 | Stop reason: HOSPADM

## 2020-02-13 RX ORDER — ONDANSETRON 2 MG/ML
4 INJECTION INTRAMUSCULAR; INTRAVENOUS DAILY PRN
Status: DISCONTINUED | OUTPATIENT
Start: 2020-02-13 | End: 2020-02-13 | Stop reason: HOSPADM

## 2020-02-13 RX ORDER — DIPHENHYDRAMINE HYDROCHLORIDE 50 MG/ML
12.5 INJECTION INTRAMUSCULAR; INTRAVENOUS
Status: DISCONTINUED | OUTPATIENT
Start: 2020-02-13 | End: 2020-02-13 | Stop reason: HOSPADM

## 2020-02-13 RX ORDER — SUCCINYLCHOLINE CHLORIDE 20 MG/ML
INJECTION INTRAMUSCULAR; INTRAVENOUS
Status: DISCONTINUED | OUTPATIENT
Start: 2020-02-13 | End: 2020-02-13

## 2020-02-13 RX ORDER — LIDOCAINE HYDROCHLORIDE 20 MG/ML
INJECTION, SOLUTION EPIDURAL; INFILTRATION; INTRACAUDAL; PERINEURAL
Status: DISCONTINUED | OUTPATIENT
Start: 2020-02-13 | End: 2020-02-13

## 2020-02-13 RX ORDER — HYDROMORPHONE HYDROCHLORIDE 1 MG/ML
0.2 INJECTION, SOLUTION INTRAMUSCULAR; INTRAVENOUS; SUBCUTANEOUS
Status: DISCONTINUED | OUTPATIENT
Start: 2020-02-13 | End: 2020-02-13 | Stop reason: HOSPADM

## 2020-02-13 RX ORDER — FAMOTIDINE 10 MG/ML
INJECTION INTRAVENOUS
Status: DISCONTINUED | OUTPATIENT
Start: 2020-02-13 | End: 2020-02-13

## 2020-02-13 RX ORDER — NALOXONE HCL 0.4 MG/ML
0.02 VIAL (ML) INJECTION
Status: DISCONTINUED | OUTPATIENT
Start: 2020-02-13 | End: 2020-02-17 | Stop reason: HOSPADM

## 2020-02-13 RX ORDER — FENTANYL CITRATE 50 UG/ML
INJECTION, SOLUTION INTRAMUSCULAR; INTRAVENOUS
Status: DISCONTINUED | OUTPATIENT
Start: 2020-02-13 | End: 2020-02-13

## 2020-02-13 RX ORDER — SODIUM CHLORIDE 9 MG/ML
INJECTION, SOLUTION INTRAVENOUS CONTINUOUS PRN
Status: DISCONTINUED | OUTPATIENT
Start: 2020-02-13 | End: 2020-02-13

## 2020-02-13 RX ORDER — EPHEDRINE SULFATE 50 MG/ML
INJECTION, SOLUTION INTRAVENOUS
Status: DISCONTINUED | OUTPATIENT
Start: 2020-02-13 | End: 2020-02-13

## 2020-02-13 RX ORDER — ONDANSETRON 2 MG/ML
INJECTION INTRAMUSCULAR; INTRAVENOUS
Status: DISCONTINUED | OUTPATIENT
Start: 2020-02-13 | End: 2020-02-13

## 2020-02-13 RX ORDER — OXYCODONE HYDROCHLORIDE 5 MG/1
5 TABLET ORAL
Status: DISCONTINUED | OUTPATIENT
Start: 2020-02-13 | End: 2020-02-13 | Stop reason: HOSPADM

## 2020-02-13 RX ORDER — ONDANSETRON 2 MG/ML
4 INJECTION INTRAMUSCULAR; INTRAVENOUS ONCE
Status: COMPLETED | OUTPATIENT
Start: 2020-02-13 | End: 2020-02-13

## 2020-02-13 RX ORDER — ROCURONIUM BROMIDE 10 MG/ML
INJECTION, SOLUTION INTRAVENOUS
Status: DISCONTINUED | OUTPATIENT
Start: 2020-02-13 | End: 2020-02-13

## 2020-02-13 RX ORDER — SODIUM CHLORIDE 9 MG/ML
INJECTION, SOLUTION INTRAVENOUS CONTINUOUS
Status: DISCONTINUED | OUTPATIENT
Start: 2020-02-13 | End: 2020-02-14

## 2020-02-13 RX ADMIN — SODIUM CHLORIDE: 0.9 INJECTION, SOLUTION INTRAVENOUS at 05:02

## 2020-02-13 RX ADMIN — HYDROMORPHONE HYDROCHLORIDE 0.5 MG: 1 INJECTION, SOLUTION INTRAMUSCULAR; INTRAVENOUS; SUBCUTANEOUS at 06:02

## 2020-02-13 RX ADMIN — HYDROMORPHONE HYDROCHLORIDE 0.2 MG: 1 INJECTION, SOLUTION INTRAMUSCULAR; INTRAVENOUS; SUBCUTANEOUS at 05:02

## 2020-02-13 RX ADMIN — GLYCOPYRROLATE 0.6 MG: 0.2 INJECTION INTRAMUSCULAR; INTRAVENOUS at 04:02

## 2020-02-13 RX ADMIN — LIDOCAINE HYDROCHLORIDE 100 MG: 20 INJECTION, SOLUTION EPIDURAL; INFILTRATION; INTRACAUDAL; PERINEURAL at 01:02

## 2020-02-13 RX ADMIN — SODIUM CHLORIDE: 0.9 INJECTION, SOLUTION INTRAVENOUS at 02:02

## 2020-02-13 RX ADMIN — SUCCINYLCHOLINE CHLORIDE 160 MG: 20 INJECTION, SOLUTION INTRAMUSCULAR; INTRAVENOUS at 01:02

## 2020-02-13 RX ADMIN — EPHEDRINE SULFATE 10 MG: 50 INJECTION, SOLUTION INTRAVENOUS at 03:02

## 2020-02-13 RX ADMIN — KETAMINE HYDROCHLORIDE 25 MG: 10 INJECTION, SOLUTION INTRAMUSCULAR; INTRAVENOUS at 02:02

## 2020-02-13 RX ADMIN — Medication: at 05:02

## 2020-02-13 RX ADMIN — PROPOFOL 100 MG: 10 INJECTION, EMULSION INTRAVENOUS at 01:02

## 2020-02-13 RX ADMIN — VANCOMYCIN HYDROCHLORIDE 1000 MG: 1 INJECTION, POWDER, LYOPHILIZED, FOR SOLUTION INTRAVENOUS at 08:02

## 2020-02-13 RX ADMIN — FAMOTIDINE 20 MG: 10 INJECTION, SOLUTION INTRAVENOUS at 02:02

## 2020-02-13 RX ADMIN — MEROPENEM 500 MG: 500 INJECTION, POWDER, FOR SOLUTION INTRAVENOUS at 06:02

## 2020-02-13 RX ADMIN — SODIUM CHLORIDE: 0.9 INJECTION, SOLUTION INTRAVENOUS at 11:02

## 2020-02-13 RX ADMIN — SODIUM CHLORIDE: 0.9 INJECTION, SOLUTION INTRAVENOUS at 01:02

## 2020-02-13 RX ADMIN — ONDANSETRON 4 MG: 2 INJECTION INTRAMUSCULAR; INTRAVENOUS at 01:02

## 2020-02-13 RX ADMIN — EPHEDRINE SULFATE 10 MG: 50 INJECTION, SOLUTION INTRAVENOUS at 04:02

## 2020-02-13 RX ADMIN — FENTANYL CITRATE 50 MCG: 50 INJECTION INTRAMUSCULAR; INTRAVENOUS at 01:02

## 2020-02-13 RX ADMIN — NEOSTIGMINE METHYLSULFATE 5 MG: 1 INJECTION INTRAVENOUS at 04:02

## 2020-02-13 RX ADMIN — EPHEDRINE SULFATE 10 MG: 50 INJECTION, SOLUTION INTRAVENOUS at 01:02

## 2020-02-13 RX ADMIN — ACETAMINOPHEN 1000 MG: 10 INJECTION, SOLUTION INTRAVENOUS at 02:02

## 2020-02-13 RX ADMIN — ROCURONIUM BROMIDE 10 MG: 10 INJECTION, SOLUTION INTRAVENOUS at 01:02

## 2020-02-13 RX ADMIN — ROCURONIUM BROMIDE 20 MG: 10 INJECTION, SOLUTION INTRAVENOUS at 01:02

## 2020-02-13 NOTE — ASSESSMENT & PLAN NOTE
Concerns for pneumatosis intestinalis on CT, however abdomen is benign on admission  Repeat CT continues to have concerns  IVF, Antiemetics  Tolerated regular diet  Stool, C diff neg  To OR today

## 2020-02-13 NOTE — PROGRESS NOTES
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  Patient reports that she has been having diarrhea multiple times a day for several years.  Reports that last Friday she was in usual health when she some developed some nausea and nonbloody nonbilious emesis and increased diarrhea.  Denies any fever, chills, abdominal pain. Patient reports that she had decreased urine output over the weekend and has not had any significant output since Saturday night or Sunday morning.  Patient reports that her diarrhea denied improved however nausea somewhat improved.  Patient cannot tolerate anymore and came into the ED for further evaluation.  Patient also reports that she has been having worsening pain in arm volar area.  Reports that she had history of vulvar cancer in the past and she feels like it has returned.     In the ED, patient received Diflucan, meropenem, vancomycin in the ED.  Patient was afebrile, without leukocytosis.  Lactic acid was unremarkable.  CT abdomen found possible pneumatosis intestinalis however patient had benign abdominal exam.  General surgery was at bedside on admission.    2/11 pt's nausea is better.  No chest pain or sob.  She wants to eat.  She has chronic diarrhea.  She his pain in the vaginal area.  No focal neurologic complaints.  Some vague right sided abd discomfort    2/12  still with abdominal pain  2/13 no cp or sob.      Plan of Care:       Assessment:    Acute kidney injury due to volume depletion-  --continue volume resuscitation--needs tlc (lost peripheral)  --no nsaids, coxibs  --keep sbp above 90  --no acute indication for hd    Anemia  --epogen ordered       Volume depletion due to gi losses  --continue iv fluids    Abdominal pain  --surgery consulted--ex lap today         Thank you for allowing us to participate in this patient's care. We will continue to follow.    Vital Signs:  Temp Readings from Last 3 Encounters:   02/13/20 98 °F (36.7 °C)   02/05/20 98.3 °F (36.8 °C)   01/22/20 97.8 °F (36.6 °C)       Pulse Readings from Last 3 Encounters:   02/13/20 73   02/05/20 87   01/22/20 (!) 55       BP Readings from Last 3 Encounters:   02/13/20 133/67   02/05/20 112/64   01/22/20 (!) 84/51       Weight:  Wt Readings from Last 3 Encounters:   02/11/20 56.7 kg (125 lb)   02/05/20 56.9 kg (125 lb 6.4 oz)   01/22/20 57.2 kg (126 lb)       Past Medical & Surgical History:  Past Medical History:   Diagnosis Date    Chronic kidney disease, stage III (moderate) 1/7/2018    Diabetes mellitus, type 2     Fatigue     Hypertension     Iron deficiency anemia due to chronic blood loss 1/7/2018    Vulvar cancer 1/7/2019       Past Surgical History:   Procedure Laterality Date    ABDOMINAL SURGERY      BACK SURGERY      CERVICAL FUSION      x 4    cervix repair      CHOLECYSTECTOMY  2000    HYSTERECTOMY  1980    LUMBAR LAMINECTOMY      NECK SURGERY      SHOULDER SURGERY      vulva cancer         Past Social History:  Social History     Socioeconomic History    Marital status:      Spouse name: Not on file    Number of children: Not on file    Years of education: Not on file    Highest education level: Not on file   Occupational History    Not on file   Social Needs    Financial resource strain: Not on file    Food insecurity:     Worry: Not on file     Inability:  Not on file    Transportation needs:     Medical: Not on file     Non-medical: Not on file   Tobacco Use    Smoking status: Former Smoker     Packs/day: 1.00     Years: 33.00     Pack years: 33.00     Types: Cigarettes     Last attempt to quit: 2000     Years since quittin.5    Smokeless tobacco: Never Used   Substance and Sexual Activity    Alcohol use: No    Drug use: No    Sexual activity: Not on file   Lifestyle    Physical activity:     Days per week: Not on file     Minutes per session: Not on file    Stress: Not on file   Relationships    Social connections:     Talks on phone: Not on file     Gets together: Not on file     Attends Confucianist service: Not on file     Active member of club or organization: Not on file     Attends meetings of clubs or organizations: Not on file     Relationship status: Not on file   Other Topics Concern    Not on file   Social History Narrative    Not on file       Medications:  No current facility-administered medications on file prior to encounter.      Current Outpatient Medications on File Prior to Encounter   Medication Sig Dispense Refill    calcitRIOL (ROCALTROL) 0.5 MCG Cap Take 1 capsule by mouth once daily.  3    dicyclomine (BENTYL) 10 MG capsule TAKE 1 CAPSULE THREE TIMES A DAY (Patient taking differently: Take 10 mg by mouth 3 (three) times daily. ) 270 capsule 4    diphenhydrAMINE (BENADRYL) 25 mg capsule Take 25 mg by mouth nightly as needed for Itching.      levothyroxine (SYNTHROID) 88 MCG tablet TAKE 1 TABLET DAILY (Patient taking differently: Take 88 mcg by mouth every morning. ) 90 tablet 4    magnesium oxide (MAG-OX) 400 mg (241.3 mg magnesium) tablet Take 400 mg by mouth 2 (two) times daily.      potassium chloride (KLOR-CON) 10 MEQ TbSR Take 20 mEq by mouth 2 (two) times daily.       promethazine (PHENERGAN) 25 MG tablet Take 1 tablet (25 mg total) by mouth daily as needed for Nausea. 90 tablet 0    sertraline (ZOLOFT) 25 MG  "tablet Take 25 mg by mouth once daily.      sodium bicarbonate 650 MG tablet Take 650 mg by mouth 3 (three) times daily.   2    traMADol (ULTRAM) 50 mg tablet Take 1 tablet (50 mg total) by mouth every 4 (four) hours as needed for Pain. 42 tablet 0     Scheduled Meds:   enoxaparin  30 mg Subcutaneous Daily    levothyroxine  88 mcg Oral QAM    meropenem (MERREM) IVPB  500 mg Intravenous Q12H    polyethylene glycol  17 g Oral Daily    sertraline  25 mg Oral Daily    vancomycin (VANCOCIN) IVPB  20 mg/kg Intravenous Once    vancomycin (VANCOCIN) IVPB  1,000 mg Intravenous Once     Continuous Infusions:    PRN Meds:.acetaminophen, acetaminophen, albuterol-ipratropium, HYDROmorphone, melatonin, ondansetron, oxyCODONE-acetaminophen, promethazine (PHENERGAN) IVPB, sodium chloride 0.9%, Pharmacy to dose Vancomycin consult **AND** vancomycin - pharmacy to dose    Allergies:  Ciprofloxacin and Pcn [penicillins]    Past Family History:  Reviewed; refer to Hospitalist Admission Note    Review of Systems:  Review of Systems - All 14 systems reviewed and negative, except as noted in HPI    Physical Exam:    /67   Pulse 73   Temp 98 °F (36.7 °C)   Resp 17   Ht 5' 7" (1.702 m)   Wt 56.7 kg (125 lb)   SpO2 97%   Breastfeeding? No   BMI 19.58 kg/m²     General Appearance:    Alert, cooperative, no distress, appears stated age   Head:    Normocephalic, without obvious abnormality, atraumatic   Eyes:    PER, conjunctiva/corneas clear, EOM's intact in both eyes        Throat:   Lips, mucosa, and tongue normal; teeth and gums normal   Back:     Symmetric, no curvature, ROM normal, no CVA tenderness   Lungs:     Clear to auscultation bilaterally, respirations unlabored   Chest wall:    No tenderness or deformity   Heart:    Regular rate and rhythm, S1 and S2 normal, no murmur, rub   or gallop   Abdomen:     Soft, non-tender, bowel sounds active all four quadrants,     no masses, no organomegaly   Extremities:   " Extremities normal, atraumatic, no cyanosis or edema   Pulses:   2+ and symmetric all extremities   MSK:   No joint or muscle swelling, tenderness or deformity   Skin:   Skin color, texture, turgor normal, no rashes or lesions   Neurologic:   CNII-XII intact, normal strength and sensation       Throughout.  No flap     Results:  Lab Results   Component Value Date     02/13/2020    K 3.7 02/13/2020     (H) 02/13/2020    CO2 17 (L) 02/13/2020    BUN 21 02/13/2020    CREATININE 2.7 (H) 02/13/2020    CALCIUM 7.9 (L) 02/13/2020    ANIONGAP 6 (L) 02/13/2020    ESTGFRAFRICA 20.0 (A) 02/13/2020    EGFRNONAA 17.3 (A) 02/13/2020       Lab Results   Component Value Date    CALCIUM 7.9 (L) 02/13/2020    PHOS 3.8 02/13/2020       Recent Labs   Lab 02/13/20  0454   WBC 4.62   RBC 2.57*   HGB 8.3*   HCT 26.8*   *   *   MCH 32.3*   MCHC 31.0*       I have personally reviewed pertinent radiological imaging and reports.

## 2020-02-13 NOTE — NURSING
Remains without IV access, Anestheia called no return call yet, on coming nurse aware of call to anestheia dept. Patient verbalized understanding of NPO. NAD

## 2020-02-13 NOTE — PROGRESS NOTES
[facility] Medicine  Progress Note    Patient Name: Shara Hutchins  MRN: [id number]  Patient Class: IP- Inpatient   Admission Date: 2/10/2020  Length of Stay: 0 days  Attending Physician: Grant Ravi MD  Primary Care Provider: April Horton MD        Subjective:     Principal Problem:Cellulitis      Interval History:  Afebrile the past 24 hr on IV antibiotics.  Renal function appears to be stable.  Discussed with surgery yesterday.  Plan for OR today.  Patient reports feeling well.  Continues to have some pain in the right lower quadrant when she eats.  Continues to have some pain around her groin area that has not improved much.    Review of Systems   Constitutional: Negative for chills, fatigue, fever and unexpected weight change.   HENT: Negative for sore throat.    Eyes: Negative for visual disturbance.   Respiratory: Negative for cough, chest tightness and shortness of breath.    Cardiovascular: Negative for chest pain.   Gastrointestinal: Negative for abdominal pain, constipation, diarrhea, nausea and vomiting.   Endocrine: Negative for polyuria.   Genitourinary: Negative for dysuria and hematuria.   Neurological: Negative for headaches.     Objective:     Vital Signs (Most Recent):  Temp: 98 °F (36.7 °C) (02/13/20 0355)  Pulse: 73 (02/13/20 0355)  Resp: 17 (02/13/20 0355)  BP: 133/67 (02/13/20 0355)  SpO2: 97 % (02/13/20 0355) Vital Signs (24h Range):  Temp:  [98 °F (36.7 °C)-98.7 °F (37.1 °C)] 98 °F (36.7 °C)  Pulse:  [66-73] 73  Resp:  [17-18] 17  SpO2:  [96 %-97 %] 97 %  BP: (112-133)/(64-80) 133/67     Weight: 56.7 kg (125 lb)  Body mass index is 19.58 kg/m².  No intake or output data in the 24 hours ending 02/13/20 0831     Physical Exam   Constitutional: She is oriented to person, place, and time. No distress.   Thin appearing   HENT:   Right Ear: External ear normal.   Left Ear: External ear normal.   Eyes: Conjunctivae and EOM are normal. Right eye exhibits no discharge.  Left eye exhibits no discharge. No scleral icterus.   Neck: Normal range of motion.   Cardiovascular: Normal rate, regular rhythm, normal heart sounds and intact distal pulses. Exam reveals no gallop and no friction rub.   No murmur heard.  Pulmonary/Chest: Effort normal and breath sounds normal. No respiratory distress. She has no wheezes. She has no rales. She exhibits no tenderness.   Abdominal: Soft. Bowel sounds are normal. She exhibits no distension. There is no tenderness. There is no guarding.   Genitourinary: Pelvic exam was performed with patient prone. There is tenderness on the right labia. There is tenderness on the left labia.   Genitourinary Comments: Mildly erythematous, extremely tender around vulvar area   Musculoskeletal: Normal range of motion. She exhibits no edema or tenderness.   Neurological: She is alert and oriented to person, place, and time.   Skin: Skin is warm. She is not diaphoretic.   Psychiatric: She has a normal mood and affect. Her behavior is normal. Judgment and thought content normal.   Nursing note and vitals reviewed.      Significant Labs:   CBC:   Recent Labs   Lab 02/12/20  0601 02/13/20  0454   WBC 4.17  4.17 4.62   HGB 8.0*  8.0* 8.3*   HCT 26.4*  26.4* 26.8*   *  137* 137*     CMP:   Recent Labs   Lab 02/12/20  0601 02/13/20  0454     143 141   K 3.9  3.9 3.7   *  119* 118*   CO2 16*  16* 17*   GLU 66*  66* 69*   BUN 22  22 21   CREATININE 2.7*  2.7* 2.7*   CALCIUM 7.9*  7.9* 7.9*   ALBUMIN 2.6*  --    ANIONGAP 8  8 6*   EGFRNONAA 17.3*  17.3* 17.3*       Significant Imaging:     X-Ray Abdomen Flat And Erect [[id number]] Resulted: 02/13/20 0830   Order Status: Completed Updated: 02/13/20 0832   Narrative:     CLINICAL HISTORY:  ([id number])70 y/o  (1950) F    abd distention;    TECHNIQUE:  (A#[id number], exam time 2/13/2020 8:27)    XR ABDOMEN FLAT AND ERECT [id number]    COMPARISON:  Most recent from 02/12/2020    FINDINGS:  There are no  abnormally dilated gas filled loops of large or small bowel to suggest bowel obstruction. There is no pneumatosis or gross pneumoperitoneum. Osseous structures show S-shaped scoliosis of the thoracolumbar spine with levoscoliosis lumbar spine.  There are diffuse scattered calcifications seen throughout the kidneys bilaterally (in a pattern suggestive, unchanged from the most recent CT of 02/12/2020, but increased in size and number when compared to the CT from 10/24/2018).  Clips in the right upper quadrant consistent with prior cholecystectomy.   Impression:       1.  Nonobstructive bowel gas pattern.    2.  Calcifications projecting to both kidneys suggestive of either medullary nephrocalcinosis or nephrolithiasis (consider correlation for history of hyperparathyroidism, medullary sponge kidney, renal tubular acidosis or other hypercalcemic states).           Assessment/Plan:      Active Hospital Problems    Diagnosis    *Cellulitis    Nausea & vomiting    Diarrhea    Acute renal failure superimposed on stage 5 chronic kidney disease, not on chronic dialysis    Pneumatosis intestinalis    Hypothyroidism    Anemia of chronic disease    Hisotry of Vulvar cancer    Stage 5 chronic kidney disease not on chronic dialysis    Inflammatory bowel disease       * Cellulitis  With history of vulvar cancer, cannot rule out recurrence  IV Meropenem, vancomycin  After d/c, oncology will refer to Dr. Aldana, patient's previous oncologist who treated her previously    Dr. Francis consulted  Dr. Lovell consulted  Dr. Dietz consulted  Appreciate consultant's      Nausea & vomiting  Concerns for pneumatosis intestinalis on CT, however abdomen is benign on admission  Repeat CT continues to have concerns  IVF, Antiemetics  Tolerated regular diet  Stool, C diff neg  To OR today    VTE Risk Mitigation (From admission, onward)         Ordered     enoxaparin injection 30 mg  Daily      02/10/20 5398     IP VTE HIGH RISK  PATIENT  Once      02/10/20 1721                      Grant Ravi MD  Department of Hospital Medicine   [facility]  Date of service: 02/13/2020 1:53 PM

## 2020-02-13 NOTE — PLAN OF CARE
Problem: Infection  Goal: Infection Symptom Resolution  Outcome: Ongoing, Progressing     Problem: Fall Injury Risk  Goal: Absence of Fall and Fall-Related Injury  Outcome: Ongoing, Progressing     Problem: Adult Inpatient Plan of Care  Goal: Plan of Care Review  Outcome: Ongoing, Progressing  Goal: Patient-Specific Goal (Individualization)  Outcome: Ongoing, Progressing  Goal: Absence of Hospital-Acquired Illness or Injury  Outcome: Ongoing, Progressing  Goal: Optimal Comfort and Wellbeing  Outcome: Ongoing, Progressing  Goal: Readiness for Transition of Care  Outcome: Ongoing, Progressing  Goal: Rounds/Family Conference  Outcome: Ongoing, Progressing

## 2020-02-13 NOTE — ANESTHESIA POSTPROCEDURE EVALUATION
Anesthesia Post Evaluation    Patient: Shara Hutchins    Procedure(s) Performed: Procedure(s) (LRB):  LAPAROSCOPY, DIAGNOSTIC (N/A)  LAPAROTOMY, EXPLORATORY (N/A)  REMOVAL, VASCULAR ACCESS PORT (Right)  CREATION, ILEOSTOMY Loop (N/A)  LYSIS, ADHESIONS (N/A)    Final Anesthesia Type: general    Patient location during evaluation: PACU  Patient participation: Yes- Able to Participate  Level of consciousness: awake and alert  Post-procedure vital signs: reviewed and stable  Pain management: adequate  Airway patency: patent  EMMA mitigation strategies: Multimodal analgesia, Extubation while patient is awake, Verification of full reversal of neuromuscular block and Extubation and recovery carried out in lateral, semiupright, or other nonsupine position  PONV status at discharge: No PONV  Anesthetic complications: no      Cardiovascular status: hemodynamically stable  Respiratory status: unassisted, spontaneous ventilation and room air  Hydration status: euvolemic  Follow-up not needed.          Vitals Value Taken Time   /69 2/13/2020  5:45 PM   Temp 36.1 °C (96.9 °F) 2/13/2020  5:30 PM   Pulse 78 2/13/2020  5:54 PM   Resp 17 2/13/2020  5:54 PM   SpO2 100 % 2/13/2020  5:54 PM   Vitals shown include unvalidated device data.      No case tracking events are documented in the log.      Pain/Kvng Score: Pain Rating Prior to Med Admin: 7 (2/13/2020  5:48 PM)  Pain Rating Post Med Admin: 5 (2/12/2020  5:30 PM)  Kvng Score: 8 (2/13/2020  5:45 PM)

## 2020-02-13 NOTE — PROGRESS NOTES
VANCOMYCIN PHARMACOKINETIC NOTE:  Vancomycin Day #4    Objective:    69 y.o., female, Actual Body Weight = 56.7 kg (125 lb)    Diagnosis/Indication for Vancomycin:  Cellulitis   Desired Vancomycin Peak:  30-35 mcg/ml; Desired Trough: 10-15 mcg/ml    Current Vancomycin Regimen:  1000mg intermittently dosed    Receiving other antibiotics:    Antibiotics (From admission, onward)      Start     Stop Route Frequency Ordered    02/13/20 0600  vancomycin in dextrose 5 % 1 gram/250 mL IVPB 1,000 mg      -- IV Once 02/12/20 2214    02/11/20 0600  meropenem 500 mg in sodium chloride 0.9 % 100 mL IVPB (ready to mix system)      -- IV Every 12 hours (non-standard times) 02/10/20 2239    02/10/20 1817  vancomycin - pharmacy to dose  (vancomycin IVPB)      -- IV pharmacy to manage frequency 02/10/20 1721    02/10/20 1600  vancomycin in dextrose 5 % 1 gram/250 mL IVPB 1,000 mg      -- IV Once 02/10/20 1456             The patient has the following labs:     2/12/2020 Estimated Creatinine Clearance: 17.6 mL/min (A) (based on SCr of 2.7 mg/dL (H)). Lab Results   Component Value Date    BUN 22 02/12/2020    BUN 22 02/12/2020       Lab Results   Component Value Date    WBC 4.17 02/12/2020    WBC 4.17 02/12/2020          Random vancomycin:  18.1 mcg/mL collected 23.85 hours after Dose #2    Assessment:  Renal function is: stable but poor  Vancomycin trough is above goal range.    Vancomycin Pharmacokinetic Parameters  Elimination rate constant (ke) - 0.037 hr-1  Elimination half-life (t½) = 18.73 hours    Plan:  Will schedule next dose of 1000 mg at 2/13/20 @ 6:00am, approximately 33 hours since last dose  Will obtain random vancomycin level 2/14/20 at/with 12:00    Pharmacy will continue to manage vancomycin therapy and adjust regimen as necessary.    Thank you for allowing us to participate in this patient's care.     Aubrey Zuniga 2/12/2020 10:15 PM  Department of Pharmacy  Ext [phone]

## 2020-02-13 NOTE — TRANSFER OF CARE
"Anesthesia Transfer of Care Note    Patient: Shara Hutchins    Procedure(s) Performed: Procedure(s) (LRB):  LAPAROSCOPY, DIAGNOSTIC (N/A)  LAPAROTOMY, EXPLORATORY (N/A)  REMOVAL, VASCULAR ACCESS PORT (Right)  CREATION, ILEOSTOMY Loop (N/A)  LYSIS, ADHESIONS (N/A)    Patient location: PACU    Anesthesia Type: general    Transport from OR: Transported from OR on room air with adequate spontaneous ventilation    Post pain: adequate analgesia    Post assessment: no apparent anesthetic complications    Post vital signs: stable    Level of consciousness: awake and alert    Nausea/Vomiting: no nausea/vomiting    Complications: none    Transfer of care protocol was followed      Last vitals:   Visit Vitals  /64   Pulse 61   Temp 36.7 °C (98.1 °F) (Oral)   Resp 17   Ht 5' 7" (1.702 m)   Wt 56.7 kg (125 lb)   SpO2 (!) 91%   Breastfeeding? No   BMI 19.58 kg/m²     "

## 2020-02-13 NOTE — ANESTHESIA PROCEDURE NOTES
Intubation  Performed by: Hernan Tipton CRNA  Authorized by: Moreno Gaming MD     Intubation:     Induction:  Intravenous    Intubated:  Postinduction    Attempts:  1    Attempted By:  CRNA    Method of Intubation:  Direct    Blade:  Shah 2    Laryngeal View Grade: Grade I - full view of chords      Difficult Airway Encountered?: No      Complications:  None    Airway Device:  Oral endotracheal tube    Airway Device Size:  7.5    Style/Cuff Inflation:  Cuffed    Tube secured:  22    Secured at:  The lips    Placement Verified By:  Capnometry    Complicating Factors:  None    Findings Post-Intubation:  BS equal bilateral and atraumatic/condition of teeth unchanged

## 2020-02-13 NOTE — ANESTHESIA PROCEDURE NOTES
Central Line    Diagnosis: Sepsis  Patient location during procedure: floor  Procedure start time: 2/12/2020 10:31 PM  Timeout: 2/12/2020 10:30 PM  Procedure end time: 2/12/2020 10:51 PM    Staffing  Authorizing Provider: Leena Arndt MD  Performing Provider: Leena Arndt MD    Staffing  Anesthesiologist: Leena Arndt MD  Performed: anesthesiologist   Anesthesiologist was present at the time of the procedure.  Preanesthetic Checklist  Completed: patient identified, pre-op evaluation, timeout performed, IV checked, risks and benefits discussed, monitors and equipment checked and anesthesia consent given  Indication   Indication: hemodynamic monitoring, vascular access, med administration     Anesthesia   local infiltration    Central Line   Skin Prep: skin prepped with ChloraPrep, skin prep agent completely dried prior to procedure  maximum sterile barriers used during central venous catheter insertion  hand hygiene performed prior to central venous catheter insertion  Location: right, internal jugular.   Catheter type: triple lumen  Catheter Size: 7 Fr  Inserted Catheter Length: 15 cm  Ultrasound: vascular probe with ultrasound  Vessel Caliber: small, patent, compressibility normal  Needle advanced into vessel with real time Ultrasound guidance.  Guidewire confirmed in vessel.  Manometry: none  Insertion Attempts: 1   Securement:line sutured, chlorhexidine patch, sterile dressing applied and blood return through all ports    Post-Procedure   X-Ray: no pneumothorax on x-ray, placement verified by x-ray and successful placement  Adverse Events:none    Guidewire Guidewire removed intact.   Additional Notes  Small but patent right IJ visualized on ultrasound with adjacent, larger pulsatile carotid. Procedure uneventful as above. Guidewire visualized in IJ on two views with ultrasound prior to dilation. Placement confirmed with chest x-ray, no adverse events noted. Vital signs monitored by floor RN, stable  throughout procedure. Patient tolerated procedure well.

## 2020-02-13 NOTE — SUBJECTIVE & OBJECTIVE
Interval History:  Afebrile the past 24 hr on IV antibiotics.  Renal function appears to be stable.  Discussed with surgery yesterday.  Plan for OR today.  Patient reports feeling well.  Continues to have some pain in the right lower quadrant when she eats.  Continues to have some pain around her groin area that has not improved much.    Review of Systems   Constitutional: Negative for chills, fatigue, fever and unexpected weight change.   HENT: Negative for sore throat.    Eyes: Negative for visual disturbance.   Respiratory: Negative for cough, chest tightness and shortness of breath.    Cardiovascular: Negative for chest pain.   Gastrointestinal: Negative for abdominal pain, constipation, diarrhea, nausea and vomiting.   Endocrine: Negative for polyuria.   Genitourinary: Negative for dysuria and hematuria.   Neurological: Negative for headaches.     Objective:     Vital Signs (Most Recent):  Temp: 98 °F (36.7 °C) (02/13/20 0355)  Pulse: 73 (02/13/20 0355)  Resp: 17 (02/13/20 0355)  BP: 133/67 (02/13/20 0355)  SpO2: 97 % (02/13/20 0355) Vital Signs (24h Range):  Temp:  [98 °F (36.7 °C)-98.7 °F (37.1 °C)] 98 °F (36.7 °C)  Pulse:  [66-73] 73  Resp:  [17-18] 17  SpO2:  [96 %-97 %] 97 %  BP: (112-133)/(64-80) 133/67     Weight: 56.7 kg (125 lb)  Body mass index is 19.58 kg/m².  No intake or output data in the 24 hours ending 02/13/20 0831     Physical Exam   Constitutional: She is oriented to person, place, and time. No distress.   Thin appearing   HENT:   Right Ear: External ear normal.   Left Ear: External ear normal.   Eyes: Conjunctivae and EOM are normal. Right eye exhibits no discharge. Left eye exhibits no discharge. No scleral icterus.   Neck: Normal range of motion.   Cardiovascular: Normal rate, regular rhythm, normal heart sounds and intact distal pulses. Exam reveals no gallop and no friction rub.   No murmur heard.  Pulmonary/Chest: Effort normal and breath sounds normal. No respiratory distress. She has  no wheezes. She has no rales. She exhibits no tenderness.   Abdominal: Soft. Bowel sounds are normal. She exhibits no distension. There is no tenderness. There is no guarding.   Genitourinary: Pelvic exam was performed with patient prone. There is tenderness on the right labia. There is tenderness on the left labia.   Genitourinary Comments: Mildly erythematous, extremely tender around vulvar area   Musculoskeletal: Normal range of motion. She exhibits no edema or tenderness.   Neurological: She is alert and oriented to person, place, and time.   Skin: Skin is warm. She is not diaphoretic.   Psychiatric: She has a normal mood and affect. Her behavior is normal. Judgment and thought content normal.   Nursing note and vitals reviewed.      Significant Labs:   CBC:   Recent Labs   Lab 02/12/20  0601 02/13/20 0454   WBC 4.17  4.17 4.62   HGB 8.0*  8.0* 8.3*   HCT 26.4*  26.4* 26.8*   *  137* 137*     CMP:   Recent Labs   Lab 02/12/20  0601 02/13/20 0454     143 141   K 3.9  3.9 3.7   *  119* 118*   CO2 16*  16* 17*   GLU 66*  66* 69*   BUN 22  22 21   CREATININE 2.7*  2.7* 2.7*   CALCIUM 7.9*  7.9* 7.9*   ALBUMIN 2.6*  --    ANIONGAP 8  8 6*   EGFRNONAA 17.3*  17.3* 17.3*       Significant Imaging:     X-Ray Abdomen Flat And Erect [[id number]] Resulted: 02/13/20 0830   Order Status: Completed Updated: 02/13/20 0832   Narrative:     CLINICAL HISTORY:  ([id number])70 y/o  (1950) F    abd distention;    TECHNIQUE:  (A#[id number], exam time 2/13/2020 8:27)    XR ABDOMEN FLAT AND ERECT [id number]    COMPARISON:  Most recent from 02/12/2020    FINDINGS:  There are no abnormally dilated gas filled loops of large or small bowel to suggest bowel obstruction. There is no pneumatosis or gross pneumoperitoneum. Osseous structures show S-shaped scoliosis of the thoracolumbar spine with levoscoliosis lumbar spine.  There are diffuse scattered calcifications seen throughout the kidneys bilaterally (in a  pattern suggestive, unchanged from the most recent CT of 02/12/2020, but increased in size and number when compared to the CT from 10/24/2018).  Clips in the right upper quadrant consistent with prior cholecystectomy.   Impression:       1.  Nonobstructive bowel gas pattern.    2.  Calcifications projecting to both kidneys suggestive of either medullary nephrocalcinosis or nephrolithiasis (consider correlation for history of hyperparathyroidism, medullary sponge kidney, renal tubular acidosis or other hypercalcemic states).

## 2020-02-13 NOTE — PLAN OF CARE
02/12/20 2100   Patient Assessment/Suction   Level of Consciousness (AVPU) alert   Respiratory Effort Unlabored   PRE-TX-O2   O2 Device (Oxygen Therapy) room air   SpO2 97 %   Pulse 66   Resp 18   Aerosol Therapy   $ Aerosol Therapy Charges PRN treatment not required   Respiratory Evaluation   $ Care Plan Tech Time 15 min   Evaluation For Re-Eval 3 day   Admitting Diagnosis Cellulitis

## 2020-02-13 NOTE — ANESTHESIA PREPROCEDURE EVALUATION
02/12/2020  Shara Hutchins is a 69 y.o., female   Patient Active Problem List   Diagnosis    Inflammatory bowel disease    Iron deficiency anemia due to chronic blood loss    Vitamin D deficiency    Stage 5 chronic kidney disease not on chronic dialysis    Hisotry of Vulvar cancer    Anemia of decreased vitamin B12 absorption    Anemia due to stage 5 chronic kidney disease treated with erythropoietin    Hypothyroidism    Anemia of chronic disease    Nausea & vomiting    Diarrhea    Cellulitis    Acute renal failure superimposed on stage 5 chronic kidney disease, not on chronic dialysis       Past Surgical History:   Procedure Laterality Date    ABDOMINAL SURGERY      BACK SURGERY      CERVICAL FUSION      x 4    cervix repair      CHOLECYSTECTOMY  2000    HYSTERECTOMY  1980    LUMBAR LAMINECTOMY      NECK SURGERY      SHOULDER SURGERY      vulva cancer          Tobacco Use:  The patient  reports that she quit smoking about 19 years ago. Her smoking use included cigarettes. She has a 33.00 pack-year smoking history. She has never used smokeless tobacco.     Results for orders placed or performed during the hospital encounter of 02/10/20   EKG 12-lead    Collection Time: 02/10/20  1:27 PM    Narrative    Test Reason : K92.1,    Vent. Rate : 076 BPM     Atrial Rate : 076 BPM     P-R Int : 154 ms          QRS Dur : 088 ms      QT Int : 352 ms       P-R-T Axes : 073 030 030 degrees     QTc Int : 396 ms    Normal sinus rhythm  Cannot rule out Inferior infarct ,age undetermined  Abnormal ECG  No previous ECGs available  Confirmed by Caridad GARCIA, Adonis ([id number]) on 2/11/2020 9:27:35 AM    Referred By: CAMPBELL   SELF           Confirmed By:Adonis Mclean MD        Imaging Results          CT Renal Stone Study ABD Pelvis WO (Final result)  Result time 02/10/20 15:48:53    Final result by Wellington  MD Zhou (02/10/20 15:48:53)                 Impression:      1. Severe diffuse hepatic steatosis.  2. Nonobstructing bilateral renal calculi.  3. Mild gaseous distention of colon, with stippled gas along posterior wall of ascending colon possibly reflecting pneumatosis intestinalis.  Clinical and laboratory correlation is requested for potential in intestinal ischemia, although benign causes or pneumatosis intestinalis can also give a similar appearance.  4. Mild diffuse subcutaneous fat stranding suggesting edema, with no soft tissue gas, abscess, or recurrent mass about the vulvar soft tissues.  I discussed the findings with Dr. Restrepo at time of dictation.      Electronically signed by: Wellington Epperson MD  Date:    02/10/2020  Time:    15:48             Narrative:      CMS MANDATED QUALITY DATA - CT RADIATION - 436    All CT scans at this facility utilize dose modulation, iterative reconstruction, and/or weight based dosing when appropriate to reduce radiation dose to as low as reasonably achievable.    EXAMINATION:  CT RENAL STONE STUDY ABD PELVIS WO    CLINICAL HISTORY:  Abdominal pain, chronic renal failure, cellulitis perineum;  history of vulvar cancer    TECHNIQUE:  CT abdomen and pelvis without IV or oral contrast. Study is tailored for detection of urinary tract calculi and evaluation of solid organs, hollow viscera, and vascular structures is limited.    COMPARISON:  10/24/2018    FINDINGS:  CT Abdomen:    Partially visualized lung bases clear aside from right lower lobe calcified granuloma.    Severe diffuse hepatic steatosis is new.  Surgical clips in gallbladder fossa indicate cholecystectomy.    Diffusely atrophic pancreatic parenchyma remains unchanged.  Prominence of extrahepatic bile duct is unchanged, likely related to post cholecystectomy state.  Noncontrast spleen and adrenals are normal.    Bilateral nonobstructing renal calculi are present, similar to prior exam.  Negative for  hydronephrosis or ureterolithiasis.    Mild gaseous distention of the colon is evident.  Stippled gas along posterior wall of ascending colon suggest pneumatosis, possibly continuing into region of hepatic flexure, without associated colonic wall thickening.  Mild gaseous distention of the colon is diffuse, within physiologic limits.  The small intestines are unremarkable.  Surgical staples about the cecum suggest prior appendectomy, with no appendix identified.  No free intraperitoneal gas.    Mild diffuse subcutaneous fat stranding occurs about the abdomen.    Convex left thoracolumbar spine curvature is evident with moderate degenerative changes affecting the spine.    CT Pelvis:    Uterus has been removed.  No adnexal mass or free pelvic fluid.  No enlarged lymph nodes.  Bladder is normal.    Soft tissues about the vulva and visualized perineum show no gas, focal fluid collection, or mass.  Mild diffuse subcutaneous fat stranding is evident.    Moderate right hip osteoarthrosis present with degenerative changes of the pubic symphysis also noted.                               X-Ray Chest AP Portable (Final result)  Result time 02/10/20 14:45:31    Final result by Meredith Colunga MD (02/10/20 14:45:31)                 Impression:      No acute cardiopulmonary abnormality.      Electronically signed by: Meredith Colunga MD  Date:    02/10/2020  Time:    14:45             Narrative:    EXAMINATION:  XR CHEST AP PORTABLE    CLINICAL HISTORY:  Cough    FINDINGS:  Portable chest at 14:47 hours is compared to prior study dated 07/22/2019 shows normal cardiomediastinal silhouette. There is a right subclavian vein Port-A-Cath with tip overlying the superior vena cava.  There is vascular stent in the left upper extremity.    Lungs are clear. Pulmonary vasculature is normal. No acute osseous abnormality.                                 Lab Results   Component Value Date    WBC 4.17 02/12/2020    WBC 4.17 02/12/2020     HGB 8.0 (L) 02/12/2020    HGB 8.0 (L) 02/12/2020    HCT 26.4 (L) 02/12/2020    HCT 26.4 (L) 02/12/2020     (H) 02/12/2020     (H) 02/12/2020     (L) 02/12/2020     (L) 02/12/2020     BMP  Lab Results   Component Value Date     02/12/2020     02/12/2020    K 3.9 02/12/2020    K 3.9 02/12/2020     (H) 02/12/2020     (H) 02/12/2020    CO2 16 (L) 02/12/2020    CO2 16 (L) 02/12/2020    BUN 22 02/12/2020    BUN 22 02/12/2020    CREATININE 2.7 (H) 02/12/2020    CREATININE 2.7 (H) 02/12/2020    CALCIUM 7.9 (L) 02/12/2020    CALCIUM 7.9 (L) 02/12/2020    ANIONGAP 8 02/12/2020    ANIONGAP 8 02/12/2020    ESTGFRAFRICA 20.0 (A) 02/12/2020    ESTGFRAFRICA 20.0 (A) 02/12/2020    EGFRNONAA 17.3 (A) 02/12/2020    EGFRNONAA 17.3 (A) 02/12/2020             Anesthesia Evaluation    I have reviewed the Patient Summary Reports.    I have reviewed the Nursing Notes.   I have reviewed the Medications.     Review of Systems  Anesthesia Hx:  No problems with previous Anesthesia  History of prior surgery of interest to airway management or planning: cervical fusion. Denies Family Hx of Anesthesia complications.   Denies Personal Hx of Anesthesia complications.   Social:  Former Smoker    Hematology/Oncology:         -- Anemia: Hematology Comments: Chronic anemia, easy bruising Current/Recent Cancer. Oncology Comments: Vulvar and ovarian cancer s/p surgery, radiation, chemo. Pt has right chest port that she reports is non functional.     Cardiovascular:   Exercise tolerance: good Hypertension Per pt, antihypertensives discontinued approx 3 weeks ago due to hypotension Functional Capacity good / => 4 METS    Pulmonary:   COPD, mild Sinus drainage for past few weeks, no recent fevers, abx, steorid use   Renal/:   Chronic Renal Disease, CRI LUE AVF but no history of HD, AVF with multiple vascular stents   Hepatic/GI:   IBS, short gut syndrome, chronic diarrhea; current nausea and abdominal  pain; vomiting previously this admission   Musculoskeletal:  Spine Disorders: cervical and lumbar    Neurological:   Peripheral Neuropathy (bilateral feet)    Endocrine:   Diabetes (previous hx, no medications currently) Hypothyroidism        Physical Exam  General:  Malnutrition    Airway/Jaw/Neck:  Airway Findings: Mouth Opening: Normal Mallampati: II  TM Distance: Normal, at least 6 cm  Jaw/Neck Findings:  Neck ROM: Normal ROM      Dental:  Dental Findings: (lower bridge) Upper Dentures    Chest/Lungs:  Chest/Lungs Findings: Clear to auscultation, Normal Respiratory Rate     Heart/Vascular:  Heart Findings: Rate: Normal  Rhythm: Regular Rhythm  Sounds: Normal  Vascular Findings:  Dialysis Access: AV Fistula LUE  Vascular Exam Findings: Right chest port (per patient, not functioning)       Skin:  Skin Findings:  Ecchymosis     Mental Status:  Mental Status Findings:  Cooperative, Alert and Oriented         Anesthesia Plan  Type of Anesthesia, risks & benefits discussed:  Anesthesia Type:  general  Patient's Preference:   Intra-op Monitoring Plan: standard ASA monitors and central line  Intra-op Monitoring Plan Comments:   Post Op Pain Control Plan: multimodal analgesia and IV/PO Opioids PRN  Post Op Pain Control Plan Comments:   Induction:   IV  Beta Blocker:  Patient is not currently on a Beta-Blocker (No further documentation required).       Informed Consent: Patient understands risks and agrees with Anesthesia plan.  Questions answered. Anesthesia consent signed with patient.  ASA Score: 3     Day of Surgery Review of History & Physical:    H&P update referred to the surgeon.     Anesthesia Plan Notes: GETA RSI for active n/v  Multimodal: Ofirmev, ketamine  PONV prophylaxis: Zofran, pepcid  Existing TLC for vascular access        Ready For Surgery From Anesthesia Perspective.

## 2020-02-13 NOTE — ASSESSMENT & PLAN NOTE
With history of vulvar cancer, cannot rule out recurrence  IV Meropenem, vancomycin  After d/c, oncology will refer to Dr. Aldana, patient's previous oncologist who treated her previously    Dr. Francis consulted  Dr. Lovell consulted  Dr. Dietz consulted  Appreciate consultant's

## 2020-02-14 ENCOUNTER — ANESTHESIA (OUTPATIENT)
Dept: MEDSURG UNIT | Facility: HOSPITAL | Age: 70
End: 2020-02-14

## 2020-02-14 ENCOUNTER — TELEPHONE (OUTPATIENT)
Dept: HEMATOLOGY/ONCOLOGY | Facility: CLINIC | Age: 70
End: 2020-02-14

## 2020-02-14 ENCOUNTER — ANESTHESIA EVENT (OUTPATIENT)
Dept: MEDSURG UNIT | Facility: HOSPITAL | Age: 70
End: 2020-02-14

## 2020-02-14 LAB
ANION GAP SERPL CALC-SCNC: 6 MMOL/L (ref 8–16)
BUN SERPL-MCNC: 20 MG/DL (ref 8–23)
CALCIUM SERPL-MCNC: 7.7 MG/DL (ref 8.7–10.5)
CHLORIDE SERPL-SCNC: 118 MMOL/L (ref 95–110)
CO2 SERPL-SCNC: 16 MMOL/L (ref 23–29)
CREAT SERPL-MCNC: 2.6 MG/DL (ref 0.5–1.4)
ERYTHROCYTE [DISTWIDTH] IN BLOOD BY AUTOMATED COUNT: 16.4 % (ref 11.5–14.5)
EST. GFR  (AFRICAN AMERICAN): 20.9 ML/MIN/1.73 M^2
EST. GFR  (NON AFRICAN AMERICAN): 18.2 ML/MIN/1.73 M^2
GLUCOSE SERPL-MCNC: 97 MG/DL (ref 70–110)
HCT VFR BLD AUTO: 29 % (ref 37–48.5)
HGB BLD-MCNC: 8.9 G/DL (ref 12–16)
MAGNESIUM SERPL-MCNC: 1.5 MG/DL (ref 1.6–2.6)
MCH RBC QN AUTO: 32.2 PG (ref 27–31)
MCHC RBC AUTO-ENTMCNC: 30.7 G/DL (ref 32–36)
MCV RBC AUTO: 105 FL (ref 82–98)
O+P STL TRI STN: NORMAL
PHOSPHATE SERPL-MCNC: 5 MG/DL (ref 2.7–4.5)
PLATELET # BLD AUTO: 146 K/UL (ref 150–350)
PMV BLD AUTO: 11.1 FL (ref 9.2–12.9)
POTASSIUM SERPL-SCNC: 3.8 MMOL/L (ref 3.5–5.1)
RBC # BLD AUTO: 2.76 M/UL (ref 4–5.4)
SODIUM SERPL-SCNC: 140 MMOL/L (ref 136–145)
WBC # BLD AUTO: 10.01 K/UL (ref 3.9–12.7)

## 2020-02-14 PROCEDURE — 84100 ASSAY OF PHOSPHORUS: CPT

## 2020-02-14 PROCEDURE — 63600175 PHARM REV CODE 636 W HCPCS: Performed by: INTERNAL MEDICINE

## 2020-02-14 PROCEDURE — 25000003 PHARM REV CODE 250: Performed by: INTERNAL MEDICINE

## 2020-02-14 PROCEDURE — 25000003 PHARM REV CODE 250: Performed by: FAMILY MEDICINE

## 2020-02-14 PROCEDURE — 99900035 HC TECH TIME PER 15 MIN (STAT)

## 2020-02-14 PROCEDURE — 85027 COMPLETE CBC AUTOMATED: CPT

## 2020-02-14 PROCEDURE — 27000221 HC OXYGEN, UP TO 24 HOURS

## 2020-02-14 PROCEDURE — 80048 BASIC METABOLIC PNL TOTAL CA: CPT

## 2020-02-14 PROCEDURE — 63600175 PHARM REV CODE 636 W HCPCS: Performed by: SURGERY

## 2020-02-14 PROCEDURE — 12000002 HC ACUTE/MED SURGE SEMI-PRIVATE ROOM

## 2020-02-14 PROCEDURE — 63600175 PHARM REV CODE 636 W HCPCS: Performed by: FAMILY MEDICINE

## 2020-02-14 PROCEDURE — 94761 N-INVAS EAR/PLS OXIMETRY MLT: CPT

## 2020-02-14 PROCEDURE — 83735 ASSAY OF MAGNESIUM: CPT

## 2020-02-14 PROCEDURE — 94770 HC EXHALED C02 TEST: CPT

## 2020-02-14 RX ORDER — DOXYCYCLINE 100 MG/1
100 CAPSULE ORAL EVERY 12 HOURS
Status: DISCONTINUED | OUTPATIENT
Start: 2020-02-14 | End: 2020-02-17 | Stop reason: HOSPADM

## 2020-02-14 RX ORDER — MUPIROCIN 20 MG/G
1 OINTMENT TOPICAL 2 TIMES DAILY
Status: DISCONTINUED | OUTPATIENT
Start: 2020-02-14 | End: 2020-02-17 | Stop reason: HOSPADM

## 2020-02-14 RX ORDER — ONDANSETRON 4 MG/1
8 TABLET, ORALLY DISINTEGRATING ORAL EVERY 8 HOURS PRN
Status: DISCONTINUED | OUTPATIENT
Start: 2020-02-14 | End: 2020-02-17 | Stop reason: HOSPADM

## 2020-02-14 RX ORDER — SODIUM CHLORIDE 9 MG/ML
INJECTION, SOLUTION INTRAVENOUS CONTINUOUS
Status: DISCONTINUED | OUTPATIENT
Start: 2020-02-14 | End: 2020-02-16

## 2020-02-14 RX ORDER — HYDROCODONE BITARTRATE AND ACETAMINOPHEN 5; 325 MG/1; MG/1
1 TABLET ORAL EVERY 4 HOURS PRN
Status: DISCONTINUED | OUTPATIENT
Start: 2020-02-14 | End: 2020-02-16

## 2020-02-14 RX ADMIN — MEROPENEM 500 MG: 500 INJECTION, POWDER, FOR SOLUTION INTRAVENOUS at 05:02

## 2020-02-14 RX ADMIN — SODIUM CHLORIDE: 0.9 INJECTION, SOLUTION INTRAVENOUS at 10:02

## 2020-02-14 RX ADMIN — SODIUM BICARBONATE: 84 INJECTION, SOLUTION INTRAVENOUS at 06:02

## 2020-02-14 RX ADMIN — SERTRALINE HYDROCHLORIDE 25 MG: 25 TABLET ORAL at 09:02

## 2020-02-14 NOTE — ANESTHESIA POST-OP PAIN MANAGEMENT
Acute Pain Service Progress Note    Shara Hutchins is a 69 y.o., female, [id number].    Surgery:  Status post colectomy    Post Op Day #: 1        Problem List:    Active Hospital Problems    Diagnosis  POA    *Pneumatosis intestinalis [K63.89]  Yes    Hypothyroidism [E03.9]  Yes     Chronic    Anemia of chronic disease [D63.8]  Yes     Chronic    Nausea & vomiting [R11.2]  Yes    Diarrhea [R19.7]  Yes    Cellulitis [L03.90]  Yes    Acute renal failure superimposed on stage 5 chronic kidney disease, not on chronic dialysis [N17.9, N18.5]  Yes    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]  Yes     Chronic    Stage 5 chronic kidney disease not on chronic dialysis [N18.5]  Yes     Chronic    Inflammatory bowel disease [K52.9]  Yes     Chronic      Resolved Hospital Problems    Diagnosis Date Resolved POA    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  Patient on Dilaudid PCA for pain control.  Patient remains NPO.  Patient denies any nausea vomiting or pruritus.  Patient with moderate pain overnight.  Patient not maximizing PCA use.  Patient is awake alert and oriented without any signs of symptoms of over-sedation.  Saturations 98%.         Plan:  Continue PCA until diet is advanced never change over to p.o. narcotics.  We will follow.       Evaluator Christiano Rowe Jr

## 2020-02-14 NOTE — BRIEF OP NOTE
[facility]  Brief Operative Note    SUMMARY     Surgery Date: 2/13/2020     Surgeon(s) and Role:     * Robbi Lovell III, MD - Primary    Assisting Surgeon: None    Pre-op Diagnosis:  Pneumatosis intestinalis [K63.89]    Post-op Diagnosis:  Post-Op Diagnosis Codes:     * Pneumatosis intestinalis [K63.89]    Procedure(s) (LRB):  LAPAROSCOPY, DIAGNOSTIC (N/A)  LAPAROTOMY, EXPLORATORY (N/A)  REMOVAL, VASCULAR ACCESS PORT (Right)  CREATION, ILEOSTOMY Loop (N/A)  LYSIS, ADHESIONS (N/A)    Anesthesia: General    Description of Procedure:  Patient brought to the operating room and transferred to the operating room table supine position. She was given general anesthesia and intubated.  Godwin catheter was placed. The abdomen was prepped and draped.  I 1st tried to examine the abdomen via laparoscopy. I made a small left upper quadrant incision and placed a Veress needle through the abdominal wall into the and abdominal cavity.  The abdomen was insufflated.  The abdomen was entered with 5 mm Visiport.  I placed 2 more 5 mm ports in the left lateral abdomen.  The patient was put in Trendelenburg position and rotated to the left. I tried to examine the terminal ileum, cecum and ascending colon where the pneumatosis was seen on CT.  I was unable to completely examine the cecum due to the adhesions from her prior surgery. I did take down omental adhesions from the anterior abdominal wall. This was located directly under her previous incision.  I aborted the laparoscopic approach.  I removed the ports.  I made a midline laparotomy incision through her previous incision.  The abdomen was entered through the linea alba.  Self retaining bowel for retractor was placed. Omental adhesions to the small bowel were taken down.  Small bowel to small bowel small bowel colon or he has ends were taken down with Metzenbaum scissors and Bovie.  I identified the terminal ileum.  It was healthy.  I traced it toward the cecum.   The cecum was draped with omentum and adhesed to the abdominal wall and to the transverse colon.  These adhesions were taken down with Bovie.  The cecum and ascending colon were closely inspected.  There was no evidence of ischemic skin changes.  There was no dilatation of the bowel.  There was no evidence of any abnormality.  We had long discussion about creating an ileostomy due to the patient's chronic and debilitating diarrhea.  Patient stated that if possible, creating an ileostomy may help with her quality of life.  This would also divert stream away from the colon.  I made a circular defect in the right abdominal wall. A loop of ileum about 30 cm from the the cecum was brought out as a loop ileostomy.  The abdomen was irrigated. The abdominal fascia was closed with running 1.  PDS.  Skin was closed with skin staples. A 16 Pashto red rubber catheter was used as a bridge for the loop ileostomy.  The loop of ileum was brought through the abdominal wall. It was matured in standard fashion as a loop ileostomy.  Ostomy appliance was placed.  Patient was extubated.  She was brought to recovery room stable condition.  Laparotomy pad count and instrument counts were correct.  Godwin catheter was left in place.      Description of the findings of the procedure:  No evidence of ischemia or gas in the bowel wall in the cecum terminal ileum or ascending colon.    Estimated Blood Loss: 100 mL    Complications none    Instrument counts correct         Specimens:   Specimen (12h ago, onward)    None

## 2020-02-14 NOTE — OP NOTE
Surgery Date: 2/13/2020     Surgeon(s) and Role:     * Robbi Lovell III, MD - Primary    Assisting Surgeon: None    Pre-op Diagnosis:  Pneumatosis intestinalis [K63.89]    Post-op Diagnosis:  Post-Op Diagnosis Codes:     * Pneumatosis intestinalis [K63.89]    Procedure(s) (LRB):  LAPAROSCOPY, DIAGNOSTIC (N/A)  LAPAROTOMY, EXPLORATORY (N/A)  REMOVAL, VASCULAR ACCESS PORT (Right)  CREATION, ILEOSTOMY Loop (N/A)  LYSIS, ADHESIONS (N/A)    Anesthesia: General    Description of Procedure:  Patient brought to the operating room and transferred to the operating room table supine position. She was given general anesthesia and intubated.  Godwin catheter was placed. The abdomen was prepped and draped.  I 1st tried to examine the abdomen via laparoscopy. I made a small left upper quadrant incision and placed a Veress needle through the abdominal wall into the and abdominal cavity.  The abdomen was insufflated.  The abdomen was entered with 5 mm Visiport.  I placed 2 more 5 mm ports in the left lateral abdomen.  The patient was put in Trendelenburg position and rotated to the left. I tried to examine the terminal ileum, cecum and ascending colon where the pneumatosis was seen on CT.  I was unable to completely examine the cecum due to the adhesions from her prior surgery. I did take down omental adhesions from the anterior abdominal wall. This was located directly under her previous incision.  I aborted the laparoscopic approach.  I removed the ports.  I made a midline laparotomy incision through her previous incision.  The abdomen was entered through the linea alba.  Self retaining bowel for retractor was placed. Omental adhesions to the small bowel were taken down.  Small bowel to small bowel small bowel colon or he has ends were taken down with Metzenbaum scissors and Bovie.  I identified the terminal ileum.  It was healthy.  I traced it toward the cecum.  The cecum was draped with omentum and adhesed to the abdominal  wall and to the transverse colon.  These adhesions were taken down with Bovie.  The cecum and ascending colon were closely inspected.  There was no evidence of ischemic skin changes.  There was no dilatation of the bowel.  There was no evidence of any abnormality.  We had long discussion about creating an ileostomy due to the patient's chronic and debilitating diarrhea.  Patient stated that if possible, creating an ileostomy may help with her quality of life.  This would also divert stream away from the colon.  I made a circular defect in the right abdominal wall. A loop of ileum about 30 cm from the the cecum was brought out as a loop ileostomy.  The abdomen was irrigated. The abdominal fascia was closed with running 1.  PDS.  Skin was closed with skin staples. A 16 Zimbabwean red rubber catheter was used as a bridge for the loop ileostomy.  The loop of ileum was brought through the abdominal wall. It was matured in standard fashion as a loop ileostomy.  Ostomy appliance was placed.  Patient was extubated.  She was brought to recovery room stable condition.  Laparotomy pad count and instrument counts were correct.  Godwin catheter was left in place.      Description of the findings of the procedure:  No evidence of ischemia or gas in the bowel wall in the cecum terminal ileum or ascending colon.    Estimated Blood Loss: 100 mL    Complications none    Instrument counts correct         Specimens:   Specimen (12h ago, onward)    None

## 2020-02-14 NOTE — PLAN OF CARE
02/13/20 2200   Patient Assessment/Suction   Level of Consciousness (AVPU) responds to voice   Respiratory Effort Normal;Unlabored   Expansion/Accessory Muscles/Retractions no use of accessory muscles   PRE-TX-O2   O2 Device (Oxygen Therapy) nasal cannula   $ Is the patient on Low Flow Oxygen? Yes   Flow (L/min) 2   SpO2 100 %   Pulse 78   Resp 16   ETCO2   $ ETCO2 Charge Exhaled CO2 Monitoring   $ ETCO2 Usage Currently wearing   ETCO2 (mmHg) 32 mmHg   ETCO2 Device Type Portable Bedside Monitor   Aerosol Therapy   $ Aerosol Therapy Charges PRN treatment not required   Respiratory Evaluation   $ Care Plan Tech Time 15 min   Evaluation For New Orders

## 2020-02-14 NOTE — ASSESSMENT & PLAN NOTE
Concerns for pneumatosis intestinalis on CT, however abdomen is benign on admission  Repeat CT continues to have concerns  Ex lap on 2/13 found no evidence of pneumatosis intestinalis.  Adhesions lysed  S/p loop ileostomy on 02/13  IVF, Antiemetics  Clear liquid  Stool, C diff neg  Improved

## 2020-02-14 NOTE — PROGRESS NOTES
[facility] Medicine  Progress Note    Patient Name: Shara Hutchins  MRN: [id number]  Patient Class: IP- Inpatient   Admission Date: 2/10/2020  Length of Stay: 1 days  Attending Physician: Grant Ravi MD  Primary Care Provider: April Horton MD        Subjective:     Principal Problem:Pneumatosis intestinalis        Interval History:  Patient had ex lap yesterday the found no pneumonitis intestinalis.  Had he has is that were lysed.  Patient had ileostomy placed due to chronic diarrhea.  Currently on PCA pump.  Patient reports feeling well.  Has some nausea.  Patient reports that pain in groin area has slightly improved.  On examination erythema has improved.  Plan to deescalate antibiotics to oral medications.    Review of Systems   Constitutional: Negative for chills, fatigue, fever and unexpected weight change.   HENT: Negative for sore throat.    Eyes: Negative for visual disturbance.   Respiratory: Negative for cough, chest tightness and shortness of breath.    Cardiovascular: Negative for chest pain.   Gastrointestinal: Negative for abdominal pain, constipation, diarrhea, nausea and vomiting.   Endocrine: Negative for polyuria.   Genitourinary: Negative for dysuria and hematuria.   Neurological: Negative for headaches.     Objective:     Vital Signs (Most Recent):  Temp: 98.3 °F (36.8 °C) (02/14/20 1200)  Pulse: 96 (02/14/20 1200)  Resp: 17 (02/14/20 1200)  BP: 109/67 (02/14/20 1200)  SpO2: 100 % (02/14/20 1200) Vital Signs (24h Range):  Temp:  [96.8 °F (36 °C)-98.3 °F (36.8 °C)] 98.3 °F (36.8 °C)  Pulse:  [] 96  Resp:  [10-23] 17  SpO2:  [94 %-100 %] 100 %  BP: (109-139)/(63-72) 109/67     Weight: 56.7 kg (125 lb)  Body mass index is 19.58 kg/m².    Intake/Output Summary (Last 24 hours) at 2/14/2020 1506  Last data filed at 2/14/2020 0552  Gross per 24 hour   Intake 806.4 ml   Output 175 ml   Net 631.4 ml      Physical Exam   Constitutional: She is oriented to person, place,  and time. No distress.   Thin appearing   HENT:   Right Ear: External ear normal.   Left Ear: External ear normal.   Eyes: Conjunctivae and EOM are normal. Right eye exhibits no discharge. Left eye exhibits no discharge. No scleral icterus.   Neck: Normal range of motion.   Cardiovascular: Normal rate, regular rhythm, normal heart sounds and intact distal pulses. Exam reveals no gallop and no friction rub.   No murmur heard.  Pulmonary/Chest: Effort normal and breath sounds normal. No respiratory distress. She has no wheezes. She has no rales. She exhibits no tenderness.   Abdominal: Soft.   S/p ileostomy   Genitourinary: Pelvic exam was performed with patient prone. There is tenderness on the right labia. There is tenderness on the left labia.   Genitourinary Comments: Mildly erythematous (improved), extremely tender around vulvar area   Musculoskeletal: Normal range of motion. She exhibits no edema or tenderness.   Neurological: She is alert and oriented to person, place, and time.   Skin: Skin is warm. She is not diaphoretic.   Psychiatric: She has a normal mood and affect. Her behavior is normal. Judgment and thought content normal.   Nursing note and vitals reviewed.      Significant Labs:   CBC:   Recent Labs   Lab 02/13/20  0454 02/14/20  0537   WBC 4.62 10.01   HGB 8.3* 8.9*   HCT 26.8* 29.0*   * 146*     CMP:   Recent Labs   Lab 02/13/20  0454 02/14/20  0537    140   K 3.7 3.8   * 118*   CO2 17* 16*   GLU 69* 97   BUN 21 20   CREATININE 2.7* 2.6*   CALCIUM 7.9* 7.7*   ANIONGAP 6* 6*   EGFRNONAA 17.3* 18.2*           Assessment/Plan:      Active Hospital Problems    Diagnosis    *Pneumatosis intestinalis    Cellulitis    Nausea & vomiting    Diarrhea    Acute renal failure superimposed on stage 5 chronic kidney disease, not on chronic dialysis    Hypothyroidism    Anemia of chronic disease    Hisotry of Vulvar cancer    Stage 5 chronic kidney disease not on chronic dialysis     Inflammatory bowel disease       Cellulitis  With history of vulvar cancer, cannot rule out recurrence  IV Meropenem, vancomycin x3 days  Now PO antibiotics  After d/c, oncology will refer to Dr. Aldana, patient's previous oncologist who treated her previously    Dr. Francis consulted  Dr. Lovell consulted  Dr. Dietz consulted  Appreciate consultant's      Nausea & vomiting  Concerns for pneumatosis intestinalis on CT, however abdomen is benign on admission  Repeat CT continues to have concerns  Ex lap on 2/13 found no evidence of pneumatosis intestinalis.  Adhesions lysed  S/p loop ileostomy on 02/13  IVF, Antiemetics  Clear liquid  Stool, C diff neg  Improved    VTE Risk Mitigation (From admission, onward)         Ordered     enoxaparin injection 30 mg  Daily      02/10/20 1748     IP VTE HIGH RISK PATIENT  Once      02/10/20 1721                      Grant Ravi MD  Department of Hospital Medicine   [facility]  Date of service: 02/14/2020 3:18 PM

## 2020-02-14 NOTE — PROGRESS NOTES
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  Patient reports that she has been having diarrhea multiple times a day for several years.  Reports that last Friday she was in usual health when she some developed some nausea and nonbloody nonbilious emesis and increased diarrhea.  Denies any fever, chills, abdominal pain. Patient reports that she had decreased urine output over the weekend and has not had any significant output since Saturday night or Sunday morning.  Patient reports that her diarrhea denied improved however nausea somewhat improved.  Patient cannot tolerate anymore and came into the ED for further evaluation.  Patient also reports that she has been having worsening pain in arm volar area.  Reports that she had history of vulvar cancer in the past and she feels like it has returned.     In the ED, patient received Diflucan, meropenem, vancomycin in the ED.  Patient was afebrile, without leukocytosis.  Lactic acid was unremarkable.  CT abdomen found possible pneumatosis intestinalis however patient had benign abdominal exam.  General surgery was at bedside on admission.    2/11 pt's nausea is better.  No chest pain or sob.  She wants to eat.  She has chronic diarrhea.  She his pain in the vaginal area.  No focal neurologic complaints.  Some vague right sided abd discomfort    2/12  still with abdominal pain  2/13 no cp or sob.    2/14 sleeping      Plan of Care:       Assessment:    Acute kidney injury due to volume depletion-    --no nsaids, coxibs  --keep sbp above 90  --no acute indication for hd    Anemia  --epogen ordered       Volume depletion due to gi losses  --continue iv fluids              Thank you for allowing us to participate in this patient's care. We will continue to follow.    Vital Signs:  Temp Readings from Last 3 Encounters:   02/14/20 98.3 °F (36.8 °C) (Oral)   02/05/20 98.3 °F (36.8 °C)   01/22/20 97.8 °F (36.6 °C)       Pulse Readings from Last 3 Encounters:   02/14/20 96   02/05/20 87   01/22/20 (!) 55       BP Readings from Last 3 Encounters:   02/14/20 109/67   02/05/20 112/64   01/22/20 (!) 84/51       Weight:  Wt Readings from Last 3 Encounters:   02/14/20 56.7 kg (125 lb)   02/05/20 56.9 kg (125 lb 6.4 oz)   01/22/20 57.2 kg (126 lb)       Past Medical & Surgical History:  Past Medical History:   Diagnosis Date    Chronic kidney disease, stage III (moderate) 1/7/2018    Diabetes mellitus, type 2     Fatigue     Hypertension     Iron deficiency anemia due to chronic blood loss 1/7/2018    Vulvar cancer 1/7/2019       Past Surgical History:   Procedure Laterality Date    ABDOMINAL SURGERY      BACK SURGERY      CERVICAL FUSION      x 4    cervix repair      CHOLECYSTECTOMY  2000    DIAGNOSTIC LAPAROSCOPY N/A 2/13/2020    Procedure: LAPAROSCOPY, DIAGNOSTIC;  Surgeon: Robbi Lovell III, MD;  Location: [facility] OR;  Service: General;  Laterality: N/A;    HYSTERECTOMY  1980    ILEOSTOMY N/A 2/13/2020    Procedure: CREATION, ILEOSTOMY Loop;  Surgeon: Robbi Lovell III, MD;  Location: [facility] OR;  Service: General;  Laterality: N/A;    LUMBAR LAMINECTOMY      LYSIS OF ADHESIONS N/A 2/13/2020    Procedure: LYSIS, ADHESIONS;  Surgeon: Robbi Lovell III, MD;  Location: [facility] OR;  Service: General;  Laterality: N/A;    NECK SURGERY      REMOVAL OF  VASCULAR ACCESS PORT Right 2020    Procedure: REMOVAL, VASCULAR ACCESS PORT;  Surgeon: Robbi Lovell III, MD;  Location: [facility];  Service: General;  Laterality: Right;    SHOULDER SURGERY      vulva cancer         Past Social History:  Social History     Socioeconomic History    Marital status:      Spouse name: Not on file    Number of children: Not on file    Years of education: Not on file    Highest education level: Not on file   Occupational History    Not on file   Social Needs    Financial resource strain: Not on file    Food insecurity:     Worry: Not on file     Inability: Not on file    Transportation needs:     Medical: Not on file     Non-medical: Not on file   Tobacco Use    Smoking status: Former Smoker     Packs/day: 1.00     Years: 33.00     Pack years: 33.00     Types: Cigarettes     Last attempt to quit: 2000     Years since quittin.5    Smokeless tobacco: Never Used   Substance and Sexual Activity    Alcohol use: No    Drug use: No    Sexual activity: Not on file   Lifestyle    Physical activity:     Days per week: Not on file     Minutes per session: Not on file    Stress: Not on file   Relationships    Social connections:     Talks on phone: Not on file     Gets together: Not on file     Attends Yazidism service: Not on file     Active member of club or organization: Not on file     Attends meetings of clubs or organizations: Not on file     Relationship status: Not on file   Other Topics Concern    Not on file   Social History Narrative    Not on file       Medications:  No current facility-administered medications on file prior to encounter.      Current Outpatient Medications on File Prior to Encounter   Medication Sig Dispense Refill    calcitRIOL (ROCALTROL) 0.5 MCG Cap Take 1 capsule by mouth once daily.  3    dicyclomine (BENTYL) 10 MG capsule TAKE 1 CAPSULE THREE TIMES A DAY (Patient taking differently: Take 10 mg by mouth 3 (three)  "times daily. ) 270 capsule 4    diphenhydrAMINE (BENADRYL) 25 mg capsule Take 25 mg by mouth nightly as needed for Itching.      levothyroxine (SYNTHROID) 88 MCG tablet TAKE 1 TABLET DAILY (Patient taking differently: Take 88 mcg by mouth every morning. ) 90 tablet 4    magnesium oxide (MAG-OX) 400 mg (241.3 mg magnesium) tablet Take 400 mg by mouth 2 (two) times daily.      potassium chloride (KLOR-CON) 10 MEQ TbSR Take 20 mEq by mouth 2 (two) times daily.       promethazine (PHENERGAN) 25 MG tablet Take 1 tablet (25 mg total) by mouth daily as needed for Nausea. 90 tablet 0    sertraline (ZOLOFT) 25 MG tablet Take 25 mg by mouth once daily.      sodium bicarbonate 650 MG tablet Take 650 mg by mouth 3 (three) times daily.   2    traMADol (ULTRAM) 50 mg tablet Take 1 tablet (50 mg total) by mouth every 4 (four) hours as needed for Pain. 42 tablet 0     Scheduled Meds:   doxycycline  100 mg Oral Q12H    enoxaparin  30 mg Subcutaneous Daily    levothyroxine  88 mcg Oral QAM    polyethylene glycol  17 g Oral Daily    sertraline  25 mg Oral Daily     Continuous Infusions:   custom IV infusion builder      HYDROmorphone in 0.9 % NaCl       PRN Meds:.acetaminophen, acetaminophen, albuterol-ipratropium, HYDROmorphone, melatonin, naloxone, ondansetron, oxyCODONE-acetaminophen, promethazine (PHENERGAN) IVPB, sodium chloride 0.9%, sodium chloride 0.9%    Allergies:  Ciprofloxacin and Pcn [penicillins]    Past Family History:  Reviewed; refer to Hospitalist Admission Note    Review of Systems:  Review of Systems - All 14 systems reviewed and negative, except as noted in HPI    Physical Exam:    /67   Pulse 96   Temp 98.3 °F (36.8 °C) (Oral)   Resp 17   Ht 5' 7" (1.702 m)   Wt 56.7 kg (125 lb)   SpO2 100%   Breastfeeding? No   BMI 19.58 kg/m²     General Appearance:    sleeping   Head:    Normocephalic, without obvious abnormality, atraumatic   Eyes:    PER, conjunctiva/corneas clear, EOM's intact " in both eyes        Throat:   Lips, mucosa, and tongue normal; teeth and gums normal   Back:     Symmetric, no curvature, ROM normal, no CVA tenderness   Lungs:     Clear to auscultation bilaterally, respirations unlabored   Chest wall:    No tenderness or deformity   Heart:    Regular rate and rhythm, S1 and S2 normal, no murmur, rub   or gallop   Abdomen:     Soft, non-tender, bowel sounds active all four quadrants,     no masses, no organomegaly   Extremities:   Extremities normal, atraumatic, no cyanosis or edema   Pulses:   2+ and symmetric all extremities   MSK:   No joint or muscle swelling, tenderness or deformity   Skin:   Skin color, texture, turgor normal, no rashes or lesions   Neurologic:   sleeping     Results:  Lab Results   Component Value Date     02/14/2020    K 3.8 02/14/2020     (H) 02/14/2020    CO2 16 (L) 02/14/2020    BUN 20 02/14/2020    CREATININE 2.6 (H) 02/14/2020    CALCIUM 7.7 (L) 02/14/2020    ANIONGAP 6 (L) 02/14/2020    ESTGFRAFRICA 20.9 (A) 02/14/2020    EGFRNONAA 18.2 (A) 02/14/2020       Lab Results   Component Value Date    CALCIUM 7.7 (L) 02/14/2020    PHOS 5.0 (H) 02/14/2020       Recent Labs   Lab 02/14/20  0537   WBC 10.01   RBC 2.76*   HGB 8.9*   HCT 29.0*   *   *   MCH 32.2*   MCHC 30.7*       I have personally reviewed pertinent radiological imaging and reports.

## 2020-02-14 NOTE — ASSESSMENT & PLAN NOTE
With history of vulvar cancer, cannot rule out recurrence  IV Meropenem, vancomycin x3 days  Now PO antibiotics  After d/c, oncology will refer to Dr. Aldana, patient's previous oncologist who treated her previously    Dr. Francis consulted  Dr. Lovell consulted  Dr. Dietz consulted  Appreciate consultant's

## 2020-02-14 NOTE — PROGRESS NOTES
[facility]  Adult Nutrition   Progress Note (Initial Assessment)     SUMMARY     Recommendations/Interventions:  1. When advancing to a regular diet, start 1800 Kcal Diabetic Diet due to Hx of T2DM.  2. If diet cannot advance to full liquid within 24-48 hours, recommend starting nutrition support due to patient being NPO/Clear liquid x 4 days (1 real meal), 16.84% unintentional weight loss in 6 months (25 lbs), and suspected malnutrition due to visual signs of wasting and severe unintentional weight loss.  3. Will add Nepro TID once diet advances to aid in meeting estimated needs.   Goals:  1. Diet to advance and patient to meet at least 75% of estimated needs via PO intake of meals and supplements.  2. Nutrition support to be considered.  Nutrition Goal Status: new    Dietitian Rounds Brief:  · Seen 2' f/u. Patient presented to ED due to persistent N/V/D and abdominal pain after eating. Patient has a hx of vulvar cancer. Patient is s/p colectomy. During admit, patient has had one meal (100%), other than that she has been NPO/clear liquid. Patient has an appetite, however previously whenever she ate she would vomit or have diarrhea as well as abdominal pain. Patient is currently hungry-will add supplement once diet advances. Patient drinks boost at home. Patient stated that she has lost 25 lbs since September. Since August, patient has lost 25 lbs (16.84%) unintentionally due to the above symptoms. Did not complete NFPE due to patients recent colectomy, however visual signs of muscle and fat wasting. Suspected severe protein energy malnutrition. Will complete NFPE at a more appropriate time. Will continue to monitor diet progression, labs, and plan of care.     Reason for Assessment    Reason For Assessment: length of stay  Diagnosis: (pneumatosis intestinalis )  Relevant Medical History: Cancer, CKD, T2DM, HTN  Interdisciplinary Rounds: did not attend    Nutrition Risk Screen    Nutrition Risk  "Screen: no indicators present     MST Score: 0  Have you recently lost weight without trying?: No  Weight loss score: 0  Have you been eating poorly because of a decreased appetite?: No  Appetite score: 0       Nutrition/Diet History    Patient Reported Diet/Restrictions/Preferences: general  Food Allergies: NKFA  Factors Affecting Nutritional Intake: NPO, abdominal pain, nausea/vomiting    Anthropometrics    Temp: 98 °F (36.7 °C)  Height Method: Stated  Height: 5' 7" (170.2 cm)  Height (inches): 67 in  Weight Method: Standard Scale  Weight: 56.7 kg (125 lb)  Weight (lb): 125 lb  Ideal Body Weight (IBW), Female: 135 lb  % Ideal Body Weight, Female (lb): 91.11 %  BMI (Calculated): 19.6  BMI Grade: 18.5-24.9 - normal  Weight Loss: unintentional  Usual Body Weight (UBW), k.18 kg  Weight Change Amount: 25 lb  % Usual Body Weight: 83.34  % Weight Change From Usual Weight: -16.84 %     Weight History:  Wt Readings from Last 14 Encounters:   20 56.7 kg (125 lb)   20 56.9 kg (125 lb 6.4 oz)   20 57.2 kg (126 lb)   20 61.4 kg (135 lb 6.4 oz)   19 59 kg (130 lb)   19 61.8 kg (136 lb 4.8 oz)   19 63.8 kg (140 lb 10.5 oz)   10/30/19 65 kg (143 lb 4.8 oz)   10/17/19 65.8 kg (145 lb)   10/16/19 65.5 kg (144 lb 4.7 oz)   19 66.3 kg (146 lb 0.9 oz)   19 66.4 kg (146 lb 6.2 oz)   19 64.9 kg (143 lb)   19 68.3 kg (150 lb 9.2 oz)     Lab/Procedures/Meds: Pertinent Labs Reviewed  Clinical Chemistry:  Recent Labs   Lab 02/10/20  1336 20  0601 20  0454 20  0537    143  143 141 140   K 3.2* 3.9  3.9 3.7 3.8   * 119*  119* 118* 118*   CO2 20* 16*  16* 17* 16*    66*  66* 69* 97   BUN 23 22  22 21 20   CREATININE 3.0* 2.7*  2.7* 2.7* 2.6*   CALCIUM 8.5* 7.9*  7.9* 7.9* 7.7*   PROT 5.5*  --   --   --    ALBUMIN 3.4* 2.6*  --   --    BILITOT 0.8  --   --   --    ALKPHOS 117  --   --   --    AST 17  --   --   --    ALT 17  --   " --   --    ANIONGAP 9 8  8 6* 6*   ESTGFRAFRICA 17.6* 20.0*  20.0* 20.0* 20.9*   EGFRNONAA 15.3* 17.3*  17.3* 17.3* 18.2*   MG 1.5* 1.7 1.7 1.5*   PHOS  --  3.3  3.3 3.8 5.0*    < > = values in this interval not displayed.     CBC:   Recent Labs   Lab 02/14/20  0537   WBC 10.01   RBC 2.76*   HGB 8.9*   HCT 29.0*   *   *   MCH 32.2*   MCHC 30.7*     Cardiac Profile:  Recent Labs   Lab 02/10/20  1336   TROPONINI <0.030     Medications: Pertinent Medications reviewed  Scheduled Meds:   enoxaparin  30 mg Subcutaneous Daily    levothyroxine  88 mcg Oral QAM    meropenem (MERREM) IVPB  500 mg Intravenous Q12H    polyethylene glycol  17 g Oral Daily    sertraline  25 mg Oral Daily    vancomycin (VANCOCIN) IVPB  20 mg/kg Intravenous Once     Continuous Infusions:   sodium chloride 0.9% 75 mL/hr at 02/13/20 1756    HYDROmorphone in 0.9 % NaCl       PRN Meds:.acetaminophen, acetaminophen, albuterol-ipratropium, HYDROmorphone, melatonin, naloxone, ondansetron, oxyCODONE-acetaminophen, promethazine (PHENERGAN) IVPB, sodium chloride 0.9%, sodium chloride 0.9%, Pharmacy to dose Vancomycin consult **AND** vancomycin - pharmacy to dose    Estimated/Assessed Needs    Weight Used For Calorie Calculations: 56.7 kg (125 lb)  Energy Calorie Requirements (kcal): 5592-8732 kcals/day (30-35 kcals/kg)  Energy Need Method: Kcal/kg  Protein Requirements: 68-85 g/day (1.2-1.5 g/kg)  Weight Used For Protein Calculations: 56.7 kg (125 lb)     Estimated Fluid Requirement Method: RDA Method    Nutrition Prescription Ordered    Current Diet Order: NPO    Evaluation of Received Nutrient/Fluid Intake    Energy Calories Required: not meeting needs  Protein Required: not meeting needs  Fluid Required: meeting needs  Tolerance: (NPO)  % Intake of Estimated Energy Needs: 0%  % Meal Intake: NPO    Intake/Output Summary (Last 24 hours) at 2/14/2020 1025  Last data filed at 2/14/2020 0552  Gross per 24 hour   Intake 1306.4 ml    Output 345 ml   Net 961.4 ml      Nutrition Risk    Level of Risk/Frequency of Follow-up: high     Monitor and Evaluation    Food and Nutrient Intake: energy intake, food and beverage intake  Food and Nutrient Adminstration: diet order  Physical Activity and Function: nutrition-related ADLs and IADLs, factors affecting access to physical activity  Anthropometric Measurements: weight, weight change  Biochemical Data, Medical Tests and Procedures: electrolyte and renal panel, lipid profile, gastrointestinal profile, glucose/endocrine profile, inflammatory profile  Nutrition-Focused Physical Findings: overall appearance     Nutrition Follow-Up    RD Follow-up?: Yes  Kitty Blanco RD 02/14/2020 10:37 AM

## 2020-02-14 NOTE — SUBJECTIVE & OBJECTIVE
Interval History:  Patient had ex lap yesterday the found no pneumonitis intestinalis.  Had he has is that were lysed.  Patient had ileostomy placed due to chronic diarrhea.  Currently on PCA pump.  Patient reports feeling well.  Has some nausea.  Patient reports that pain in groin area has slightly improved.  On examination erythema has improved.  Plan to deescalate antibiotics to oral medications.    Review of Systems   Constitutional: Negative for chills, fatigue, fever and unexpected weight change.   HENT: Negative for sore throat.    Eyes: Negative for visual disturbance.   Respiratory: Negative for cough, chest tightness and shortness of breath.    Cardiovascular: Negative for chest pain.   Gastrointestinal: Negative for abdominal pain, constipation, diarrhea, nausea and vomiting.   Endocrine: Negative for polyuria.   Genitourinary: Negative for dysuria and hematuria.   Neurological: Negative for headaches.     Objective:     Vital Signs (Most Recent):  Temp: 98.3 °F (36.8 °C) (02/14/20 1200)  Pulse: 96 (02/14/20 1200)  Resp: 17 (02/14/20 1200)  BP: 109/67 (02/14/20 1200)  SpO2: 100 % (02/14/20 1200) Vital Signs (24h Range):  Temp:  [96.8 °F (36 °C)-98.3 °F (36.8 °C)] 98.3 °F (36.8 °C)  Pulse:  [] 96  Resp:  [10-23] 17  SpO2:  [94 %-100 %] 100 %  BP: (109-139)/(63-72) 109/67     Weight: 56.7 kg (125 lb)  Body mass index is 19.58 kg/m².    Intake/Output Summary (Last 24 hours) at 2/14/2020 1506  Last data filed at 2/14/2020 0552  Gross per 24 hour   Intake 806.4 ml   Output 175 ml   Net 631.4 ml      Physical Exam   Constitutional: She is oriented to person, place, and time. No distress.   Thin appearing   HENT:   Right Ear: External ear normal.   Left Ear: External ear normal.   Eyes: Conjunctivae and EOM are normal. Right eye exhibits no discharge. Left eye exhibits no discharge. No scleral icterus.   Neck: Normal range of motion.   Cardiovascular: Normal rate, regular rhythm, normal heart sounds and  intact distal pulses. Exam reveals no gallop and no friction rub.   No murmur heard.  Pulmonary/Chest: Effort normal and breath sounds normal. No respiratory distress. She has no wheezes. She has no rales. She exhibits no tenderness.   Abdominal: Soft.   S/p ileostomy   Genitourinary: Pelvic exam was performed with patient prone. There is tenderness on the right labia. There is tenderness on the left labia.   Genitourinary Comments: Mildly erythematous (improved), extremely tender around vulvar area   Musculoskeletal: Normal range of motion. She exhibits no edema or tenderness.   Neurological: She is alert and oriented to person, place, and time.   Skin: Skin is warm. She is not diaphoretic.   Psychiatric: She has a normal mood and affect. Her behavior is normal. Judgment and thought content normal.   Nursing note and vitals reviewed.      Significant Labs:   CBC:   Recent Labs   Lab 02/13/20  0454 02/14/20  0537   WBC 4.62 10.01   HGB 8.3* 8.9*   HCT 26.8* 29.0*   * 146*     CMP:   Recent Labs   Lab 02/13/20  0454 02/14/20  0537    140   K 3.7 3.8   * 118*   CO2 17* 16*   GLU 69* 97   BUN 21 20   CREATININE 2.7* 2.6*   CALCIUM 7.9* 7.7*   ANIONGAP 6* 6*   EGFRNONAA 17.3* 18.2*

## 2020-02-14 NOTE — PROGRESS NOTES
[facility]  General Surgery  Progress Note    Subjective:     Interval History: no acute events overnight. She states the right and lower abdominal pain is now replaced by incisional pain. She is having some nausea. Afebrile       Post-Op Info:  Procedure(s) (LRB):  LAPAROSCOPY, DIAGNOSTIC (N/A)  LAPAROTOMY, EXPLORATORY (N/A)  REMOVAL, VASCULAR ACCESS PORT (Right)  CREATION, ILEOSTOMY Loop (N/A)  LYSIS, ADHESIONS (N/A)   1 Day Post-Op      Medications:  Continuous Infusions:   sodium chloride 0.9% 75 mL/hr at 02/13/20 1756    HYDROmorphone in 0.9 % NaCl       Scheduled Meds:   enoxaparin  30 mg Subcutaneous Daily    levothyroxine  88 mcg Oral QAM    meropenem (MERREM) IVPB  500 mg Intravenous Q12H    polyethylene glycol  17 g Oral Daily    sertraline  25 mg Oral Daily    vancomycin (VANCOCIN) IVPB  20 mg/kg Intravenous Once     PRN Meds:acetaminophen, acetaminophen, albuterol-ipratropium, HYDROmorphone, melatonin, naloxone, ondansetron, oxyCODONE-acetaminophen, promethazine (PHENERGAN) IVPB, sodium chloride 0.9%, sodium chloride 0.9%, Pharmacy to dose Vancomycin consult **AND** vancomycin - pharmacy to dose     Objective:     Vital Signs (Most Recent):  Temp: 98.3 °F (36.8 °C) (02/14/20 1200)  Pulse: 96 (02/14/20 1200)  Resp: 17 (02/14/20 1200)  BP: 109/67 (02/14/20 1200)  SpO2: 100 % (02/14/20 1200) Vital Signs (24h Range):  Temp:  [96.8 °F (36 °C)-98.3 °F (36.8 °C)] 98.3 °F (36.8 °C)  Pulse:  [] 96  Resp:  [10-23] 17  SpO2:  [94 %-100 %] 100 %  BP: (109-139)/(63-72) 109/67       Intake/Output Summary (Last 24 hours) at 2/14/2020 1344  Last data filed at 2/14/2020 0552  Gross per 24 hour   Intake 1306.4 ml   Output 345 ml   Net 961.4 ml       Physical Exam   Constitutional: She is oriented to person, place, and time.   Pulmonary/Chest: Effort normal. No respiratory distress.   Abdominal: Soft. She exhibits no distension. There is tenderness. There is no rigidity, no rebound and no  guarding.       Incision is intact     Ostomy is viable with bilious output   Tenderness is appropriate   Neurological: She is alert and oriented to person, place, and time.       Significant Labs:  CBC:   Recent Labs   Lab 02/14/20  0537   WBC 10.01   RBC 2.76*   HGB 8.9*   HCT 29.0*   *   *   MCH 32.2*   MCHC 30.7*     CMP:   Recent Labs   Lab 02/14/20  0537   GLU 97   CALCIUM 7.7*      K 3.8   CO2 16*   *   BUN 20   CREATININE 2.6*         Assessment/Plan:     Active Diagnoses:    Diagnosis Date Noted POA    PRINCIPAL PROBLEM:  Pneumatosis intestinalis [K63.89] 02/13/2020 Yes    Hypothyroidism [E03.9] 02/10/2020 Yes     Chronic    Anemia of chronic disease [D63.8] 02/10/2020 Yes     Chronic    Nausea & vomiting [R11.2] 02/10/2020 Yes    Diarrhea [R19.7] 02/10/2020 Yes    Cellulitis [L03.90] 02/10/2020 Yes    Acute renal failure superimposed on stage 5 chronic kidney disease, not on chronic dialysis [N17.9, N18.5] 02/10/2020 Yes    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

## 2020-02-14 NOTE — PLAN OF CARE
Problem: Oral Intake Inadequate  Goal: Improved Oral Intake  Outcome: Ongoing, Not Progressing  Intervention: Promote and Optimize Oral Intake  Flowsheets (Taken 2/14/2020 1038)  Oral Nutrition Promotion: calorie dense liquids provided   When advancing to a regular diet, start 1800 Kcal Diabetic Diet due to Hx of T2DM.  If diet cannot advance to full liquid within 24-48 hours, recommend starting nutrition support due to patient being NPO/Clear liquid x 4 days (1 real meal), 16.84% unintentional weight loss in 6 months (25 lbs), and suspected malnutrition due to visual signs of wasting and severe unintentional weight loss.  Will add Nepro TID once diet advances to aid in meeting estimated needs.

## 2020-02-15 LAB
ANION GAP SERPL CALC-SCNC: 8 MMOL/L (ref 8–16)
BACTERIA BLD CULT: NORMAL
BACTERIA BLD CULT: NORMAL
BUN SERPL-MCNC: 24 MG/DL (ref 8–23)
CALCIUM SERPL-MCNC: 7.9 MG/DL (ref 8.7–10.5)
CHLORIDE SERPL-SCNC: 107 MMOL/L (ref 95–110)
CO2 SERPL-SCNC: 22 MMOL/L (ref 23–29)
CREAT SERPL-MCNC: 2.7 MG/DL (ref 0.5–1.4)
ERYTHROCYTE [DISTWIDTH] IN BLOOD BY AUTOMATED COUNT: 16.2 % (ref 11.5–14.5)
EST. GFR  (AFRICAN AMERICAN): 20 ML/MIN/1.73 M^2
EST. GFR  (NON AFRICAN AMERICAN): 17.3 ML/MIN/1.73 M^2
GLUCOSE SERPL-MCNC: 94 MG/DL (ref 70–110)
HCT VFR BLD AUTO: 25.1 % (ref 37–48.5)
HGB BLD-MCNC: 7.8 G/DL (ref 12–16)
MAGNESIUM SERPL-MCNC: 1.5 MG/DL (ref 1.6–2.6)
MCH RBC QN AUTO: 32.1 PG (ref 27–31)
MCHC RBC AUTO-ENTMCNC: 31.1 G/DL (ref 32–36)
MCV RBC AUTO: 103 FL (ref 82–98)
PHOSPHATE SERPL-MCNC: 3.8 MG/DL (ref 2.7–4.5)
PLATELET # BLD AUTO: 126 K/UL (ref 150–350)
PMV BLD AUTO: 11.4 FL (ref 9.2–12.9)
POTASSIUM SERPL-SCNC: 3.7 MMOL/L (ref 3.5–5.1)
RBC # BLD AUTO: 2.43 M/UL (ref 4–5.4)
SODIUM SERPL-SCNC: 137 MMOL/L (ref 136–145)
WBC # BLD AUTO: 7.47 K/UL (ref 3.9–12.7)

## 2020-02-15 PROCEDURE — 83735 ASSAY OF MAGNESIUM: CPT

## 2020-02-15 PROCEDURE — 63600175 PHARM REV CODE 636 W HCPCS: Performed by: SURGERY

## 2020-02-15 PROCEDURE — 25000003 PHARM REV CODE 250: Performed by: SURGERY

## 2020-02-15 PROCEDURE — 99900035 HC TECH TIME PER 15 MIN (STAT)

## 2020-02-15 PROCEDURE — 27000221 HC OXYGEN, UP TO 24 HOURS

## 2020-02-15 PROCEDURE — 25000003 PHARM REV CODE 250: Performed by: FAMILY MEDICINE

## 2020-02-15 PROCEDURE — 12000002 HC ACUTE/MED SURGE SEMI-PRIVATE ROOM

## 2020-02-15 PROCEDURE — 84100 ASSAY OF PHOSPHORUS: CPT

## 2020-02-15 PROCEDURE — 94770 HC EXHALED C02 TEST: CPT

## 2020-02-15 PROCEDURE — 25000003 PHARM REV CODE 250: Performed by: INTERNAL MEDICINE

## 2020-02-15 PROCEDURE — 63600175 PHARM REV CODE 636 W HCPCS: Performed by: ANESTHESIOLOGY

## 2020-02-15 PROCEDURE — 85027 COMPLETE CBC AUTOMATED: CPT

## 2020-02-15 PROCEDURE — 94761 N-INVAS EAR/PLS OXIMETRY MLT: CPT

## 2020-02-15 PROCEDURE — 63600175 PHARM REV CODE 636 W HCPCS: Performed by: INTERNAL MEDICINE

## 2020-02-15 PROCEDURE — 80048 BASIC METABOLIC PNL TOTAL CA: CPT

## 2020-02-15 RX ORDER — DIPHENHYDRAMINE HCL 12.5MG/5ML
12.5 ELIXIR ORAL
Status: DISCONTINUED | OUTPATIENT
Start: 2020-02-15 | End: 2020-02-15

## 2020-02-15 RX ORDER — DIPHENHYDRAMINE HYDROCHLORIDE 50 MG/ML
12.5 INJECTION INTRAMUSCULAR; INTRAVENOUS
Status: DISCONTINUED | OUTPATIENT
Start: 2020-02-15 | End: 2020-02-16

## 2020-02-15 RX ADMIN — DIPHENHYDRAMINE HYDROCHLORIDE 12.5 MG: 50 INJECTION INTRAMUSCULAR; INTRAVENOUS at 05:02

## 2020-02-15 RX ADMIN — MUPIROCIN 1 G: 20 OINTMENT TOPICAL at 08:02

## 2020-02-15 RX ADMIN — ONDANSETRON HYDROCHLORIDE 4 MG: 2 INJECTION, SOLUTION INTRAMUSCULAR; INTRAVENOUS at 07:02

## 2020-02-15 RX ADMIN — LEVOTHYROXINE SODIUM 88 MCG: 88 TABLET ORAL at 07:02

## 2020-02-15 RX ADMIN — DOXYCYCLINE HYCLATE 100 MG: 100 CAPSULE ORAL at 10:02

## 2020-02-15 RX ADMIN — Medication: at 07:02

## 2020-02-15 RX ADMIN — SODIUM CHLORIDE: 0.9 INJECTION, SOLUTION INTRAVENOUS at 06:02

## 2020-02-15 RX ADMIN — DOXYCYCLINE HYCLATE 100 MG: 100 CAPSULE ORAL at 08:02

## 2020-02-15 RX ADMIN — MUPIROCIN 1 G: 20 OINTMENT TOPICAL at 10:02

## 2020-02-15 RX ADMIN — DIPHENHYDRAMINE HYDROCHLORIDE 12.5 MG: 50 INJECTION INTRAMUSCULAR; INTRAVENOUS at 11:02

## 2020-02-15 RX ADMIN — SERTRALINE HYDROCHLORIDE 25 MG: 25 TABLET ORAL at 10:02

## 2020-02-15 RX ADMIN — SODIUM BICARBONATE: 84 INJECTION, SOLUTION INTRAVENOUS at 10:02

## 2020-02-15 RX ADMIN — ENOXAPARIN SODIUM 30 MG: 100 INJECTION SUBCUTANEOUS at 05:02

## 2020-02-15 NOTE — PROGRESS NOTES
[facility]  Progress Note    Patient Name: Shara Hutchins  MRN: [id number]  Patient Class: IP- Inpatient   Admission Date: 2/10/2020  Length of Stay: 2 days  Attending Physician: Grant Ravi MD  Primary Care Provider: April Horton MD        Subjective:     Principal Problem:Pneumatosis intestinalis    Interval History:  Afebrile the past 24 hr.  Tolerating clear liquids without difficulty.  Denies nausea, vomiting.  Pain well controlled on PCA pump.  Has output from ileostomy bag.  Asking when she can advance her diet.  She is concerned that she is going to lose more weight.    Review of Systems   Constitutional: Negative for chills, fatigue, fever and unexpected weight change.   HENT: Negative for sore throat.    Eyes: Negative for visual disturbance.   Respiratory: Negative for cough, chest tightness and shortness of breath.    Cardiovascular: Negative for chest pain.   Gastrointestinal: Negative for abdominal pain, constipation, diarrhea, nausea and vomiting.   Endocrine: Negative for polyuria.   Genitourinary: Negative for dysuria and hematuria.   Neurological: Negative for headaches.     Objective:     Vital Signs (Most Recent):  Temp: 97.9 °F (36.6 °C) (02/15/20 1144)  Pulse: 91 (02/15/20 1144)  Resp: 12 (02/15/20 1144)  BP: 104/67 (02/15/20 1144)  SpO2: (!) 93 % (02/15/20 1144) Vital Signs (24h Range):  Temp:  [97.9 °F (36.6 °C)-98.6 °F (37 °C)] 97.9 °F (36.6 °C)  Pulse:  [78-97] 91  Resp:  [12-17] 12  SpO2:  [93 %-100 %] 93 %  BP: (101-160)/(62-85) 104/67     Weight: 56.7 kg (125 lb)  Body mass index is 19.58 kg/m².    Intake/Output Summary (Last 24 hours) at 2/15/2020 1239  Last data filed at 2/15/2020 0400  Gross per 24 hour   Intake 15.2 ml   Output --   Net 15.2 ml      Physical Exam   Constitutional: She is oriented to person, place, and time. No distress.   Thin appearing   HENT:   Right Ear: External ear normal.   Left Ear: External ear normal.   Eyes:  Conjunctivae and EOM are normal. Right eye exhibits no discharge. Left eye exhibits no discharge. No scleral icterus.   Neck: Normal range of motion.   Cardiovascular: Normal rate, regular rhythm, normal heart sounds and intact distal pulses. Exam reveals no gallop and no friction rub.   No murmur heard.  Pulmonary/Chest: Effort normal and breath sounds normal. No respiratory distress. She has no wheezes. She has no rales. She exhibits no tenderness.   Abdominal: Soft.   S/p ileostomy   Genitourinary: Pelvic exam was performed with patient prone. There is tenderness on the right labia. There is tenderness on the left labia.   Genitourinary Comments: Mildly erythematous (improved), extremely tender around vulvar area   Musculoskeletal: Normal range of motion. She exhibits no edema or tenderness.   Neurological: She is alert and oriented to person, place, and time.   Skin: Skin is warm. She is not diaphoretic.   Psychiatric: She has a normal mood and affect. Her behavior is normal. Judgment and thought content normal.   Nursing note and vitals reviewed.      Significant Labs:   CBC:   Recent Labs   Lab 02/14/20  0537 02/15/20  0604   WBC 10.01 7.47   HGB 8.9* 7.8*   HCT 29.0* 25.1*   * 126*     CMP:   Recent Labs   Lab 02/14/20  0537 02/15/20  0604    137   K 3.8 3.7   * 107   CO2 16* 22*   GLU 97 94   BUN 20 24*   CREATININE 2.6* 2.7*   CALCIUM 7.7* 7.9*   ANIONGAP 6* 8   EGFRNONAA 18.2* 17.3*       Assessment/Plan:      Active Hospital Problems    Diagnosis    *Pneumatosis intestinalis    Cellulitis    Nausea & vomiting    Diarrhea    Acute renal failure superimposed on stage 5 chronic kidney disease, not on chronic dialysis    Hypothyroidism    Anemia of chronic disease    Hisotry of Vulvar cancer    Stage 5 chronic kidney disease not on chronic dialysis    Inflammatory bowel disease       Cellulitis  With history of vulvar cancer, cannot rule out recurrence  IV Meropenem, vancomycin x3  days  Now PO antibiotics  After d/c, oncology will refer to Dr. Aldana, patient's previous oncologist who treated her previously    Dr. Francis consulted  Dr. Lovell consulted  Dr. Dietz consulted  Appreciate consultant's      Nausea & vomiting  Concerns for pneumatosis intestinalis on CT, however abdomen is benign on admission  Repeat CT continues to have concerns  Ex lap on 2/13 found no evidence of pneumatosis intestinalis.  Adhesions lysed  S/p loop ileostomy on 02/13  IVF, Antiemetics  Clear liquid  Stool, C diff neg  Pain well controlled on PCA pump    VTE Risk Mitigation (From admission, onward)         Ordered     Place sequential compression device  Until discontinued      02/14/20 2237     enoxaparin injection 30 mg  Daily      02/10/20 1748     IP VTE HIGH RISK PATIENT  Once      02/10/20 1721                      Grant Ravi MD  Department of Hospital Medicine   [facility]  Date of service: 02/15/2020 12:44 PM

## 2020-02-15 NOTE — ASSESSMENT & PLAN NOTE
Understanding Middle Ear Infections in Children    Middle ear infections are most common in children under age 5. Crankiness, a fever, and tugging at or rubbing the ear may all be signs that your child has a middle ear infection. This is especially true if your child has a cold or other viral illness. It's important to call your healthcare provider if you see these or any of the signs listed below.  Call your child's healthcare provider if you notice any signs of a middle ear infection.   What are middle ear infections?  Middle ear infections occur behind the eardrum. The eardrum is the thin sheet of tissue that passes sound waves between the outer and middle ear. These infections are usually caused by bacteria or viruses. These are often related to a recent cold or allergy problem.  A blocked tube  In young children, these bacteria or viruses likely reach the middle ear by traveling the short length of the eustachian tube from the back of the nose. Once in the middle ear, they multiply and spread. This irritates delicate tissues lining the middle ear and eustachian tube. If the tube lining swells enough to block off the tube, air pressure drops in the middle ear. This pulls the eardrum inward, making it stiffer and less able to transmit sound.  Fluid buildup causes pain  Once the eustachian tube swells shut, moisture cant drain from the middle ear. Fluid that should flush out the infection builds up in the chamber. This may raise pressure behind the eardrum. This can decrease pain slightly. But if the infection spreads to this fluid, pressure behind the eardrum goes way up. The eardrum is forced outward. It becomes painful, and may break.  Chronic fluid affects hearing  If the eardrum doesnt break and the tube remains blocked, the fluid becomes an ongoing (chronic) condition. As the immediate (acute) infection passes, the middle ear fluid thickens. It becomes sticky and takes up less space. Pressure drops in  S/p Ileostomy    Adv to full liquids and then as federica  Probably ready for D/C tomorrow   the middle ear once more. Inward suction stiffens the eardrum. This affects hearing. If the fluid is not removed, the eardrum may be stretched and damaged.  Signs of middle ear problems  · A fever over 100.4°F (38.0°C) and cold symptoms  · Severe ear pain  · Any kind of discharge from the ear  · Ear pain that gets worse or doesnt go away after a few days   When to call your child's healthcare provider  Call your child's healthcare provider's office if your otherwise healthy child has any of the signs or symptoms described below:  · Fever (see Fever and children, below)  · Your child has had a seizure caused by the fever  · Rapid breathing or shortness of breath  · A stiff neck or headache  · Trouble swallowing  · Your child acts ill after the fever is gone  · Persistent brown, green, or bloody mucus  · Signs of dehydration. These include severe thirst, dark yellow urine, infrequent urination, dull or sunken eyes, dry skin, and dry or cracked lips.  · Your child still doesn't look or act right to you, even after taking a non-aspirin pain reliever  Fever and children  Always use a digital thermometer to check your childs temperature. Never use a mercury thermometer.  For infants and toddlers, be sure to use a rectal thermometer correctly. A rectal thermometer may accidentally poke a hole in (perforate) the rectum. It may also pass on germs from the stool. Always follow the product makers directions for proper use. If you dont feel comfortable taking a rectal temperature, use another method. When you talk to your childs healthcare provider, tell him or her which method you used to take your childs temperature.  Here are guidelines for fever temperature. Ear temperatures arent accurate before 6 months of age. Dont take an oral temperature until your child is at least 4 years old.  Infant under 3 months old:  · Ask your childs healthcare provider how you should take the temperature.  · Rectal or forehead  (temporal artery) temperature of 100.4°F (38°C) or higher, or as directed by the provider  · Armpit temperature of 99°F (37.2°C) or higher, or as directed by the provider  Child age 3 to 36 months:  · Rectal, forehead (temporal artery), or ear temperature of 102°F (38.9°C) or higher, or as directed by the provider  · Armpit temperature of 101°F (38.3°C) or higher, or as directed by the provider  Child of any age:  · Repeated temperature of 104°F (40°C) or higher, or as directed by the provider  · Fever that lasts more than 24 hours in a child under 2 years old. Or a fever that lasts for 3 days in a child 2 years or older.   Date Last Reviewed: 11/1/2016  © 9437-6009 [employer]. [address] [place] [id number]. All rights reserved. This information is not intended as a substitute for professional medical care. Always follow your healthcare professional's instructions.

## 2020-02-15 NOTE — PROGRESS NOTES
[facility]  General Surgery  Progress Note    Subjective:     History of Present Illness:  No notes on file    Post-Op Info:  Procedure(s) (LRB):  LAPAROSCOPY, DIAGNOSTIC (N/A)  LAPAROTOMY, EXPLORATORY (N/A)  REMOVAL, VASCULAR ACCESS PORT (Right)  CREATION, ILEOSTOMY Loop (N/A)  LYSIS, ADHESIONS (N/A)   2 Days Post-Op     Interval History:   Doing well  No c/o  Ileostomy working well  Angela clears  No N/V      Medications:  Continuous Infusions:   sodium chloride 0.9% 125 mL/hr at 02/14/20 2245    custom IV infusion builder 75 mL/hr at 02/15/20 1008    HYDROmorphone in 0.9 % NaCl       Scheduled Meds:   doxycycline  100 mg Oral Q12H    enoxaparin  30 mg Subcutaneous Daily    levothyroxine  88 mcg Oral QAM    mupirocin  1 g Nasal BID    polyethylene glycol  17 g Oral Daily    sertraline  25 mg Oral Daily     PRN Meds:acetaminophen, acetaminophen, albuterol-ipratropium, diphenhydrAMINE, HYDROcodone-acetaminophen, HYDROmorphone, melatonin, naloxone, ondansetron, ondansetron, oxyCODONE-acetaminophen, promethazine (PHENERGAN) IVPB, sodium chloride 0.9%, sodium chloride 0.9%     Review of patient's allergies indicates:   Allergen Reactions    Ciprofloxacin Other (See Comments)     Elevated liver enzymes      Pcn [penicillins] Anaphylaxis     Objective:     Vital Signs (Most Recent):  Temp: 97.9 °F (36.6 °C) (02/15/20 1144)  Pulse: 91 (02/15/20 1144)  Resp: 12 (02/15/20 1144)  BP: 104/67 (02/15/20 1144)  SpO2: (!) 93 % (02/15/20 1144) Vital Signs (24h Range):  Temp:  [97.9 °F (36.6 °C)-98.6 °F (37 °C)] 97.9 °F (36.6 °C)  Pulse:  [78-97] 91  Resp:  [12-17] 12  SpO2:  [93 %-100 %] 93 %  BP: (101-160)/(62-85) 104/67     Weight: 56.7 kg (125 lb)  Body mass index is 19.58 kg/m².    Intake/Output - Last 3 Shifts       02/13 0700 - 02/14 0659 02/14 0700 - 02/15 0659 02/15 0700 - 02/16 0659    I.V. (mL/kg) 1306.4 (23) 15.2 (0.3)     Total Intake(mL/kg) 1306.4 (23) 15.2 (0.3)     Urine (mL/kg/hr) 245 (0.2)       Blood 100      Total Output 345      Net +961.4 +15.2                  Physical Exam   Constitutional: She is oriented to person, place, and time. She is cooperative. No distress.   Abdominal: Soft. She exhibits no distension. There is no tenderness. There is no rebound and no guarding.   Neurological: She is oriented to person, place, and time.   Skin:   Incisions are clean, dry and intact  There is no evidence of infection, hematoma or seroma          Assessment/Plan:     * Pneumatosis intestinalis  S/p Ileostomy    Adv to full liquids and then as federica  Probably ready for D/C tomorrow        Melani Ruff MD  General Surgery  [facility]

## 2020-02-15 NOTE — PROGRESS NOTES
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  Patient reports that she has been having diarrhea multiple times a day for several years.  Reports that last Friday she was in usual health when she some developed some nausea and nonbloody nonbilious emesis and increased diarrhea.  Denies any fever, chills, abdominal pain. Patient reports that she had decreased urine output over the weekend and has not had any significant output since Saturday night or Sunday morning.  Patient reports that her diarrhea denied improved however nausea somewhat improved.  Patient cannot tolerate anymore and came into the ED for further evaluation.  Patient also reports that she has been having worsening pain in arm volar area.  Reports that she had history of vulvar cancer in the past and she feels like it has returned.     In the ED, patient received Diflucan, meropenem, vancomycin in the ED.  Patient was afebrile, without leukocytosis.  Lactic acid was unremarkable.  CT abdomen found possible pneumatosis intestinalis however patient had benign abdominal exam.  General surgery was at bedside on admission.    2/11 pt's nausea is better.  No chest pain or sob.  She wants to eat.  She has chronic diarrhea.  She his pain in the vaginal area.  No focal neurologic complaints.  Some vague right sided abd discomfort    2/12  still with abdominal pain  2/13 no cp or sob.    2/14 sleeping    2/15 sleeping.  No distress      Plan of Care:       Assessment:    Acute kidney injury due to volume depletion-  --cr stable  --no nsaids, coxibs  --keep sbp above 90  --no acute indication for hd    Anemia  --epogen ordered       Volume depletion due to gi losses  --continue iv fluids              Thank you for allowing us to participate in this patient's care. We will continue to follow.    Vital Signs:  Temp Readings from Last 3 Encounters:   02/15/20 98.6 °F (37 °C) (Oral)   02/05/20 98.3 °F (36.8 °C)   01/22/20 97.8 °F (36.6 °C)       Pulse Readings from Last 3 Encounters:   02/15/20 78   02/05/20 87   01/22/20 (!) 55       BP Readings from Last 3 Encounters:   02/15/20 123/65   02/05/20 112/64   01/22/20 (!) 84/51       Weight:  Wt Readings from Last 3 Encounters:   02/14/20 56.7 kg (125 lb)   02/05/20 56.9 kg (125 lb 6.4 oz)   01/22/20 57.2 kg (126 lb)       Past Medical & Surgical History:  Past Medical History:   Diagnosis Date    Chronic kidney disease, stage III (moderate) 1/7/2018    Diabetes mellitus, type 2     Fatigue     Hypertension     Iron deficiency anemia due to chronic blood loss 1/7/2018    Vulvar cancer 1/7/2019       Past Surgical History:   Procedure Laterality Date    ABDOMINAL SURGERY      BACK SURGERY      CERVICAL FUSION      x 4    cervix repair      CHOLECYSTECTOMY  2000    DIAGNOSTIC LAPAROSCOPY N/A 2/13/2020    Procedure: LAPAROSCOPY, DIAGNOSTIC;  Surgeon: Robbi Lovell III, MD;  Location: [facility] OR;  Service: General;  Laterality: N/A;    HYSTERECTOMY  1980    ILEOSTOMY N/A 2/13/2020    Procedure: CREATION, ILEOSTOMY Loop;  Surgeon: Robbi Lovell III, MD;  Location: [facility] OR;  Service: General;  Laterality: N/A;    LUMBAR LAMINECTOMY      LYSIS OF ADHESIONS N/A 2/13/2020    Procedure: LYSIS, ADHESIONS;  Surgeon: Robbi Lovell III, MD;  Location: [facility] OR;  Service: General;  Laterality:  N/A;    NECK SURGERY      REMOVAL OF VASCULAR ACCESS PORT Right 2020    Procedure: REMOVAL, VASCULAR ACCESS PORT;  Surgeon: Robbi Lovell III, MD;  Location: [facility];  Service: General;  Laterality: Right;    SHOULDER SURGERY      vulva cancer         Past Social History:  Social History     Socioeconomic History    Marital status:      Spouse name: Not on file    Number of children: Not on file    Years of education: Not on file    Highest education level: Not on file   Occupational History    Not on file   Social Needs    Financial resource strain: Not on file    Food insecurity:     Worry: Not on file     Inability: Not on file    Transportation needs:     Medical: Not on file     Non-medical: Not on file   Tobacco Use    Smoking status: Former Smoker     Packs/day: 1.00     Years: 33.00     Pack years: 33.00     Types: Cigarettes     Last attempt to quit: 2000     Years since quittin.5    Smokeless tobacco: Never Used   Substance and Sexual Activity    Alcohol use: No    Drug use: No    Sexual activity: Not on file   Lifestyle    Physical activity:     Days per week: Not on file     Minutes per session: Not on file    Stress: Not on file   Relationships    Social connections:     Talks on phone: Not on file     Gets together: Not on file     Attends [employer] service: Not on file     Active member of club or organization: Not on file     Attends meetings of clubs or organizations: Not on file     Relationship status: Not on file   Other Topics Concern    Not on file   Social History Narrative    Not on file       Medications:  No current facility-administered medications on file prior to encounter.      Current Outpatient Medications on File Prior to Encounter   Medication Sig Dispense Refill    calcitRIOL (ROCALTROL) 0.5 MCG Cap Take 1 capsule by mouth once daily.  3    dicyclomine (BENTYL) 10 MG capsule TAKE 1 CAPSULE THREE TIMES A DAY (Patient taking  differently: Take 10 mg by mouth 3 (three) times daily. ) 270 capsule 4    diphenhydrAMINE (BENADRYL) 25 mg capsule Take 25 mg by mouth nightly as needed for Itching.      levothyroxine (SYNTHROID) 88 MCG tablet TAKE 1 TABLET DAILY (Patient taking differently: Take 88 mcg by mouth every morning. ) 90 tablet 4    magnesium oxide (MAG-OX) 400 mg (241.3 mg magnesium) tablet Take 400 mg by mouth 2 (two) times daily.      potassium chloride (KLOR-CON) 10 MEQ TbSR Take 20 mEq by mouth 2 (two) times daily.       promethazine (PHENERGAN) 25 MG tablet Take 1 tablet (25 mg total) by mouth daily as needed for Nausea. 90 tablet 0    sertraline (ZOLOFT) 25 MG tablet Take 25 mg by mouth once daily.      sodium bicarbonate 650 MG tablet Take 650 mg by mouth 3 (three) times daily.   2    traMADol (ULTRAM) 50 mg tablet Take 1 tablet (50 mg total) by mouth every 4 (four) hours as needed for Pain. 42 tablet 0     Scheduled Meds:   doxycycline  100 mg Oral Q12H    enoxaparin  30 mg Subcutaneous Daily    levothyroxine  88 mcg Oral QAM    mupirocin  1 g Nasal BID    polyethylene glycol  17 g Oral Daily    sertraline  25 mg Oral Daily     Continuous Infusions:   sodium chloride 0.9% 125 mL/hr at 02/14/20 2245    custom IV infusion builder 75 mL/hr at 02/15/20 1008    HYDROmorphone in 0.9 % NaCl       PRN Meds:.acetaminophen, acetaminophen, albuterol-ipratropium, diphenhydrAMINE, HYDROcodone-acetaminophen, HYDROmorphone, melatonin, naloxone, ondansetron, ondansetron, oxyCODONE-acetaminophen, promethazine (PHENERGAN) IVPB, sodium chloride 0.9%, sodium chloride 0.9%    Allergies:  Ciprofloxacin and Pcn [penicillins]    Past Family History:  Reviewed; refer to Hospitalist Admission Note    Review of Systems:  Review of Systems - All 14 systems reviewed and negative, except as noted in HPI    Physical Exam:    /65 (BP Location: Right arm, Patient Position: Lying)   Pulse 78   Temp 98.6 °F (37 °C) (Oral)   Resp 13    "Ht 5' 7" (1.702 m)   Wt 56.7 kg (125 lb)   SpO2 98%   Breastfeeding? No   BMI 19.58 kg/m²     General Appearance:    sleeping   Head:    Normocephalic, without obvious abnormality, atraumatic   Eyes:    PER, conjunctiva/corneas clear, EOM's intact in both eyes        Throat:   Lips, mucosa, and tongue normal; teeth and gums normal   Back:     Symmetric, no curvature, ROM normal, no CVA tenderness   Lungs:     Clear to auscultation bilaterally, respirations unlabored   Chest wall:    No tenderness or deformity   Heart:    Regular rate and rhythm, S1 and S2 normal, no murmur, rub   or gallop   Abdomen:     Soft, non-tender, bowel sounds active all four quadrants,     no masses, no organomegaly   Extremities:   Extremities normal, atraumatic, no cyanosis or edema   Pulses:   2+ and symmetric all extremities   MSK:   No joint or muscle swelling, tenderness or deformity   Skin:   Skin color, texture, turgor normal, no rashes or lesions   Neurologic:   sleeping     Results:  Lab Results   Component Value Date     02/15/2020    K 3.7 02/15/2020     02/15/2020    CO2 22 (L) 02/15/2020    BUN 24 (H) 02/15/2020    CREATININE 2.7 (H) 02/15/2020    CALCIUM 7.9 (L) 02/15/2020    ANIONGAP 8 02/15/2020    ESTGFRAFRICA 20.0 (A) 02/15/2020    EGFRNONAA 17.3 (A) 02/15/2020       Lab Results   Component Value Date    CALCIUM 7.9 (L) 02/15/2020    PHOS 3.8 02/15/2020       Recent Labs   Lab 02/15/20  0604   WBC 7.47   RBC 2.43*   HGB 7.8*   HCT 25.1*   *   *   MCH 32.1*   MCHC 31.1*       I have personally reviewed pertinent radiological imaging and reports.       "

## 2020-02-15 NOTE — ASSESSMENT & PLAN NOTE
Concerns for pneumatosis intestinalis on CT, however abdomen is benign on admission  Repeat CT continues to have concerns  Ex lap on 2/13 found no evidence of pneumatosis intestinalis.  Adhesions lysed  S/p loop ileostomy on 02/13  IVF, Antiemetics  Clear liquid  Stool, C diff neg  Pain well controlled on PCA pump

## 2020-02-15 NOTE — PROGRESS NOTES
"[facility]  Adult Nutrition   Progress Note (Follow-Up)    SUMMARY     Recommendations  Recommendation/Intervention: 1.) Recommend progressing diet as per surgery. 2.) When ready for solid diet, rec'd 1800 ADA   Goals: 1.) PO diet to be advanced >CLD w/in 24-48 hrs 2.) Pt to meet estimated needs prior to discharge    Nutrition Goal Status: progressing towards goal    Dietitian Rounds Brief    Tolerating clear liquids. No N/V noted. Does not like clear liquid diet; wants to advance diet. Noted + output via ostomy. RD encouraged PO intake. Offered supplement, pt declined.    Reason for Assessment  Reason For Assessment: RD follow-up    Nutrition Risk Screen  Nutrition Risk Screen: no indicators present     MST Score: 0  Have you recently lost weight without trying?: No  Weight loss score: 0  Have you been eating poorly because of a decreased appetite?: No  Appetite score: 0       Nutrition/Diet History  Patient Reported Diet/Restrictions/Preferences: general  Spiritual, Cultural Beliefs, Yarsanism Practices, Values that Affect Care: no  Food Allergies: NKFA  Factors Affecting Nutritional Intake: clear liquid diet    Anthropometrics  Temp: 97.9 °F (36.6 °C)  Height Method: Stated  Height: 5' 7" (170.2 cm)  Height (inches): 67 in  Weight Method: Standard Scale  Weight: 56.7 kg (125 lb)  Weight (lb): 125 lb  Ideal Body Weight (IBW), Female: 135 lb  % Ideal Body Weight, Female (lb): 91.11 %  BMI (Calculated): 19.6  BMI Grade: 18.5-24.9 - normal  Weight Loss: unintentional  Usual Body Weight (UBW), k.18 kg  Weight Change Amount: 25 lb  % Usual Body Weight: 83.34  % Weight Change From Usual Weight: -16.84 %       Weight History:  Wt Readings from Last 10 Encounters:   20 56.7 kg (125 lb)   20 56.9 kg (125 lb 6.4 oz)   20 57.2 kg (126 lb)   20 61.4 kg (135 lb 6.4 oz)   19 59 kg (130 lb)   19 61.8 kg (136 lb 4.8 oz)   19 63.8 kg (140 lb 10.5 oz)   10/30/19 65 kg " (143 lb 4.8 oz)   10/17/19 65.8 kg (145 lb)   10/16/19 65.5 kg (144 lb 4.7 oz)       Lab/Procedures/Meds: Pertinent Labs Reviewed  Clinical Chemistry:  Recent Labs   Lab 02/10/20  1336  02/12/20  0601 02/13/20  0454 02/14/20  0537 02/15/20  0604      < > 143  143 141 140 137   K 3.2*   < > 3.9  3.9 3.7 3.8 3.7   *   < > 119*  119* 118* 118* 107   CO2 20*   < > 16*  16* 17* 16* 22*      < > 66*  66* 69* 97 94   BUN 23   < > 22  22 21 20 24*   CREATININE 3.0*   < > 2.7*  2.7* 2.7* 2.6* 2.7*   CALCIUM 8.5*   < > 7.9*  7.9* 7.9* 7.7* 7.9*   PROT 5.5*  --   --   --   --   --    ALBUMIN 3.4*   < > 2.6*  --   --   --    BILITOT 0.8  --   --   --   --   --    ALKPHOS 117  --   --   --   --   --    AST 17  --   --   --   --   --    ALT 17  --   --   --   --   --    ANIONGAP 9   < > 8  8 6* 6* 8   ESTGFRAFRICA 17.6*   < > 20.0*  20.0* 20.0* 20.9* 20.0*   EGFRNONAA 15.3*   < > 17.3*  17.3* 17.3* 18.2* 17.3*   MG 1.5*   < > 1.7 1.7 1.5* 1.5*   PHOS  --    < > 3.3  3.3 3.8 5.0* 3.8    < > = values in this interval not displayed.     CBC:   Recent Labs   Lab 02/15/20  0604   WBC 7.47   RBC 2.43*   HGB 7.8*   HCT 25.1*   *   *   MCH 32.1*   MCHC 31.1*     Cardiac Profile:  Recent Labs   Lab 02/10/20  1336   TROPONINI <0.030     Vitamins:  Recent Labs   Lab 02/12/20  0601   [id number]       Medications: Pertinent Medications reviewed  Scheduled Meds:   doxycycline  100 mg Oral Q12H    enoxaparin  30 mg Subcutaneous Daily    levothyroxine  88 mcg Oral QAM    mupirocin  1 g Nasal BID    polyethylene glycol  17 g Oral Daily    sertraline  25 mg Oral Daily     Continuous Infusions:   sodium chloride 0.9% 125 mL/hr at 02/14/20 2245    custom IV infusion builder 75 mL/hr at 02/15/20 1008    HYDROmorphone in 0.9 % NaCl       PRN Meds:.acetaminophen, acetaminophen, albuterol-ipratropium, diphenhydrAMINE, HYDROcodone-acetaminophen, HYDROmorphone, melatonin, naloxone, ondansetron,  ondansetron, oxyCODONE-acetaminophen, promethazine (PHENERGAN) IVPB, sodium chloride 0.9%, sodium chloride 0.9%    Estimated/Assessed Needs    Weight Used For Calorie Calculations: 56.7 kg (125 lb)  Energy Calorie Requirements (kcal): 9698-5437 kcals/day (30-35 kcals/kg)  Energy Need Method: Kcal/kg  Protein Requirements: 68-85 g/day (1.2-1.5 g/kg)  Weight Used For Protein Calculations: 56.7 kg (125 lb)     Estimated Fluid Requirement Method: RDA Method  RDA Method (mL): 1701       Nutrition Prescription Ordered  Current Diet Order: clear liquids     Evaluation of Received Nutrient/Fluid Intake  Energy Calories Required: not meeting needs  Protein Required: not meeting needs  Fluid Required: meeting needs  Tolerance: tolerating     Intake/Output Summary (Last 24 hours) at 2/15/2020 1417  Last data filed at 2/15/2020 0400  Gross per 24 hour   Intake 15.2 ml   Output --   Net 15.2 ml        % Meal Intake: 25 - 50 %      Nutrition Risk  Level of Risk/Frequency of Follow-up: high       Monitor and Evaluation  Food and Nutrient Intake: food and beverage intake, energy intake  Food and Nutrient Adminstration: diet order  Physical Activity and Function: nutrition-related ADLs and IADLs  Anthropometric Measurements: weight change, weight  Biochemical Data, Medical Tests and Procedures: electrolyte and renal panel, gastrointestinal profile, glucose/endocrine profile, inflammatory profile  Nutrition-Focused Physical Findings: overall appearance     Nutrition Follow-Up  RD Follow-up?: Yes    Altagracia Mcdonough RD 02/15/2020 2:17 PM

## 2020-02-15 NOTE — PLAN OF CARE
02/15/20 0740   Patient Assessment/Suction   Level of Consciousness (AVPU) alert   Respiratory Effort Unlabored;Normal   Expansion/Accessory Muscles/Retractions no use of accessory muscles;no retractions;expansion symmetric   All Lung Fields Breath Sounds clear   Rhythm/Pattern, Respiratory pattern regular;depth regular;no shortness of breath reported   Cough Frequency infrequent   Cough Type good   PRE-TX-O2   O2 Device (Oxygen Therapy) nasal cannula   $ Is the patient on Low Flow Oxygen? Yes   Flow (L/min) 3   SpO2 97 %   Pulse Oximetry Type Continuous   $ Pulse Oximetry - Multiple Charge Pulse Oximetry - Multiple   Pulse 88   Resp 17   ETCO2   $ ETCO2 Charge Exhaled CO2 Monitoring   $ ETCO2 Usage Currently wearing   ETCO2 (mmHg) 30 mmHg   ETCO2 Device Type Portable Bedside Monitor   Aerosol Therapy   $ Aerosol Therapy Charges PRN treatment not required

## 2020-02-15 NOTE — SUBJECTIVE & OBJECTIVE
Interval History:  Afebrile the past 24 hr.  Tolerating clear liquids without difficulty.  Denies nausea, vomiting.  Pain well controlled on PCA pump.  Has output from ileostomy bag.  Asking when she can advance her diet.  She is concerned that she is going to lose more weight.    Review of Systems   Constitutional: Negative for chills, fatigue, fever and unexpected weight change.   HENT: Negative for sore throat.    Eyes: Negative for visual disturbance.   Respiratory: Negative for cough, chest tightness and shortness of breath.    Cardiovascular: Negative for chest pain.   Gastrointestinal: Negative for abdominal pain, constipation, diarrhea, nausea and vomiting.   Endocrine: Negative for polyuria.   Genitourinary: Negative for dysuria and hematuria.   Neurological: Negative for headaches.     Objective:     Vital Signs (Most Recent):  Temp: 97.9 °F (36.6 °C) (02/15/20 1144)  Pulse: 91 (02/15/20 1144)  Resp: 12 (02/15/20 1144)  BP: 104/67 (02/15/20 1144)  SpO2: (!) 93 % (02/15/20 1144) Vital Signs (24h Range):  Temp:  [97.9 °F (36.6 °C)-98.6 °F (37 °C)] 97.9 °F (36.6 °C)  Pulse:  [78-97] 91  Resp:  [12-17] 12  SpO2:  [93 %-100 %] 93 %  BP: (101-160)/(62-85) 104/67     Weight: 56.7 kg (125 lb)  Body mass index is 19.58 kg/m².    Intake/Output Summary (Last 24 hours) at 2/15/2020 1239  Last data filed at 2/15/2020 0400  Gross per 24 hour   Intake 15.2 ml   Output --   Net 15.2 ml      Physical Exam   Constitutional: She is oriented to person, place, and time. No distress.   Thin appearing   HENT:   Right Ear: External ear normal.   Left Ear: External ear normal.   Eyes: Conjunctivae and EOM are normal. Right eye exhibits no discharge. Left eye exhibits no discharge. No scleral icterus.   Neck: Normal range of motion.   Cardiovascular: Normal rate, regular rhythm, normal heart sounds and intact distal pulses. Exam reveals no gallop and no friction rub.   No murmur heard.  Pulmonary/Chest: Effort normal and breath  sounds normal. No respiratory distress. She has no wheezes. She has no rales. She exhibits no tenderness.   Abdominal: Soft.   S/p ileostomy   Genitourinary: Pelvic exam was performed with patient prone. There is tenderness on the right labia. There is tenderness on the left labia.   Genitourinary Comments: Mildly erythematous (improved), extremely tender around vulvar area   Musculoskeletal: Normal range of motion. She exhibits no edema or tenderness.   Neurological: She is alert and oriented to person, place, and time.   Skin: Skin is warm. She is not diaphoretic.   Psychiatric: She has a normal mood and affect. Her behavior is normal. Judgment and thought content normal.   Nursing note and vitals reviewed.      Significant Labs:   CBC:   Recent Labs   Lab 02/14/20  0537 02/15/20  0604   WBC 10.01 7.47   HGB 8.9* 7.8*   HCT 29.0* 25.1*   * 126*     CMP:   Recent Labs   Lab 02/14/20  0537 02/15/20  0604    137   K 3.8 3.7   * 107   CO2 16* 22*   GLU 97 94   BUN 20 24*   CREATININE 2.6* 2.7*   CALCIUM 7.7* 7.9*   ANIONGAP 6* 8   EGFRNONAA 18.2* 17.3*

## 2020-02-15 NOTE — ANESTHESIA POST-OP PAIN MANAGEMENT
Acute Pain Service Progress Note    Postop day 2 status post colectomy.  Patient wide awake with no evidence of respiratory depression.  She reports good pain control with her PCA hydromorphone (4.4 mg over the past 12 hr) but now reports 7/10 pain. I had the patient press her PCA button and within minutes her pain was cut in half.  Patient is tolerating water with no more nausea than her baseline chronic nausea.  Patient also reports severe itching, which she also had before surgery.  I informed the patient that she has IV medications available to treat nausea and itching.  She asked for the Zofran but is holding off on the Benadryl because she does not want to be sedated at this time. Will continue PCA until patient can tolerate full liquids and pain pills.

## 2020-02-15 NOTE — PLAN OF CARE
Problem: Skin Injury Risk Increased  Goal: Skin Health and Integrity  Outcome: Ongoing, Progressing     Problem: Oral Intake Inadequate  Goal: Improved Oral Intake  Outcome: Ongoing, Progressing       Recommendation/Intervention: 1.) Recommend progressing diet as per surgery. 2.) When ready for solid diet, rec'd 1800 ADA   Goals: 1.) PO diet to be advanced >CLD w/in 24-48 hrs 2.) Pt to meet estimated needs prior to discharge

## 2020-02-16 LAB
ANION GAP SERPL CALC-SCNC: 7 MMOL/L (ref 8–16)
BUN SERPL-MCNC: 25 MG/DL (ref 8–23)
CALCIUM SERPL-MCNC: 7.6 MG/DL (ref 8.7–10.5)
CHLORIDE SERPL-SCNC: 109 MMOL/L (ref 95–110)
CO2 SERPL-SCNC: 23 MMOL/L (ref 23–29)
CREAT SERPL-MCNC: 2.5 MG/DL (ref 0.5–1.4)
ERYTHROCYTE [DISTWIDTH] IN BLOOD BY AUTOMATED COUNT: 16 % (ref 11.5–14.5)
EST. GFR  (AFRICAN AMERICAN): 21.9 ML/MIN/1.73 M^2
EST. GFR  (NON AFRICAN AMERICAN): 19 ML/MIN/1.73 M^2
GLUCOSE SERPL-MCNC: 67 MG/DL (ref 70–110)
HCT VFR BLD AUTO: 24.1 % (ref 37–48.5)
HCT VFR BLD AUTO: 24.1 % (ref 37–48.5)
HGB BLD-MCNC: 7.3 G/DL (ref 12–16)
HGB BLD-MCNC: 7.5 G/DL (ref 12–16)
MAGNESIUM SERPL-MCNC: 1.5 MG/DL (ref 1.6–2.6)
MCH RBC QN AUTO: 31.3 PG (ref 27–31)
MCHC RBC AUTO-ENTMCNC: 30.3 G/DL (ref 32–36)
MCV RBC AUTO: 103 FL (ref 82–98)
PHOSPHATE SERPL-MCNC: 3.4 MG/DL (ref 2.7–4.5)
PLATELET # BLD AUTO: 124 K/UL (ref 150–350)
PMV BLD AUTO: 11.7 FL (ref 9.2–12.9)
POTASSIUM SERPL-SCNC: 3.6 MMOL/L (ref 3.5–5.1)
RBC # BLD AUTO: 2.33 M/UL (ref 4–5.4)
SODIUM SERPL-SCNC: 139 MMOL/L (ref 136–145)
WBC # BLD AUTO: 5.41 K/UL (ref 3.9–12.7)

## 2020-02-16 PROCEDURE — 99900035 HC TECH TIME PER 15 MIN (STAT)

## 2020-02-16 PROCEDURE — 94799 UNLISTED PULMONARY SVC/PX: CPT

## 2020-02-16 PROCEDURE — 63600175 PHARM REV CODE 636 W HCPCS: Performed by: SURGERY

## 2020-02-16 PROCEDURE — 85018 HEMOGLOBIN: CPT

## 2020-02-16 PROCEDURE — 97116 GAIT TRAINING THERAPY: CPT

## 2020-02-16 PROCEDURE — 80048 BASIC METABOLIC PNL TOTAL CA: CPT

## 2020-02-16 PROCEDURE — 85014 HEMATOCRIT: CPT

## 2020-02-16 PROCEDURE — 25000003 PHARM REV CODE 250: Performed by: FAMILY MEDICINE

## 2020-02-16 PROCEDURE — 94770 HC EXHALED C02 TEST: CPT

## 2020-02-16 PROCEDURE — 94761 N-INVAS EAR/PLS OXIMETRY MLT: CPT

## 2020-02-16 PROCEDURE — 84100 ASSAY OF PHOSPHORUS: CPT

## 2020-02-16 PROCEDURE — 85027 COMPLETE CBC AUTOMATED: CPT

## 2020-02-16 PROCEDURE — 97162 PT EVAL MOD COMPLEX 30 MIN: CPT

## 2020-02-16 PROCEDURE — 25000003 PHARM REV CODE 250: Performed by: SURGERY

## 2020-02-16 PROCEDURE — 12000002 HC ACUTE/MED SURGE SEMI-PRIVATE ROOM

## 2020-02-16 PROCEDURE — 25000003 PHARM REV CODE 250: Performed by: ANESTHESIOLOGY

## 2020-02-16 PROCEDURE — 63600175 PHARM REV CODE 636 W HCPCS: Performed by: ANESTHESIOLOGY

## 2020-02-16 PROCEDURE — 83735 ASSAY OF MAGNESIUM: CPT

## 2020-02-16 RX ORDER — CALCIUM CHLORIDE IN 0.9 % NACL 1 G/100 ML
1 INTRAVENOUS SOLUTION, PIGGYBACK (ML) INTRAVENOUS
Status: DISCONTINUED | OUTPATIENT
Start: 2020-02-16 | End: 2020-02-17 | Stop reason: HOSPADM

## 2020-02-16 RX ORDER — POTASSIUM CHLORIDE 7.45 MG/ML
40 INJECTION INTRAVENOUS
Status: DISCONTINUED | OUTPATIENT
Start: 2020-02-16 | End: 2020-02-17 | Stop reason: HOSPADM

## 2020-02-16 RX ORDER — HYDROCODONE BITARTRATE AND ACETAMINOPHEN 5; 325 MG/1; MG/1
2 TABLET ORAL EVERY 4 HOURS PRN
Status: DISCONTINUED | OUTPATIENT
Start: 2020-02-16 | End: 2020-02-17 | Stop reason: HOSPADM

## 2020-02-16 RX ORDER — POTASSIUM CHLORIDE 7.45 MG/ML
20 INJECTION INTRAVENOUS
Status: DISCONTINUED | OUTPATIENT
Start: 2020-02-16 | End: 2020-02-17 | Stop reason: HOSPADM

## 2020-02-16 RX ORDER — POTASSIUM CHLORIDE 20 MEQ/1
40 TABLET, EXTENDED RELEASE ORAL
Status: DISCONTINUED | OUTPATIENT
Start: 2020-02-16 | End: 2020-02-17 | Stop reason: HOSPADM

## 2020-02-16 RX ORDER — MAGNESIUM SULFATE 1 G/100ML
1 INJECTION INTRAVENOUS
Status: DISCONTINUED | OUTPATIENT
Start: 2020-02-16 | End: 2020-02-17 | Stop reason: HOSPADM

## 2020-02-16 RX ORDER — MAGNESIUM SULFATE HEPTAHYDRATE 40 MG/ML
4 INJECTION, SOLUTION INTRAVENOUS
Status: DISCONTINUED | OUTPATIENT
Start: 2020-02-16 | End: 2020-02-17 | Stop reason: HOSPADM

## 2020-02-16 RX ORDER — MAGNESIUM SULFATE HEPTAHYDRATE 40 MG/ML
2 INJECTION, SOLUTION INTRAVENOUS
Status: DISCONTINUED | OUTPATIENT
Start: 2020-02-16 | End: 2020-02-17 | Stop reason: HOSPADM

## 2020-02-16 RX ORDER — SERTRALINE HYDROCHLORIDE 50 MG/1
50 TABLET, FILM COATED ORAL DAILY
Status: DISCONTINUED | OUTPATIENT
Start: 2020-02-17 | End: 2020-02-17 | Stop reason: HOSPADM

## 2020-02-16 RX ORDER — DIPHENHYDRAMINE HCL 25 MG
25 CAPSULE ORAL EVERY 6 HOURS PRN
Status: DISCONTINUED | OUTPATIENT
Start: 2020-02-16 | End: 2020-02-17 | Stop reason: HOSPADM

## 2020-02-16 RX ORDER — POTASSIUM CHLORIDE 20 MEQ/1
20 TABLET, EXTENDED RELEASE ORAL
Status: DISCONTINUED | OUTPATIENT
Start: 2020-02-16 | End: 2020-02-17 | Stop reason: HOSPADM

## 2020-02-16 RX ORDER — LANOLIN ALCOHOL/MO/W.PET/CERES
800 CREAM (GRAM) TOPICAL
Status: DISCONTINUED | OUTPATIENT
Start: 2020-02-16 | End: 2020-02-17 | Stop reason: HOSPADM

## 2020-02-16 RX ADMIN — SERTRALINE HYDROCHLORIDE 25 MG: 25 TABLET ORAL at 09:02

## 2020-02-16 RX ADMIN — MUPIROCIN 1 G: 20 OINTMENT TOPICAL at 09:02

## 2020-02-16 RX ADMIN — ENOXAPARIN SODIUM 30 MG: 100 INJECTION SUBCUTANEOUS at 05:02

## 2020-02-16 RX ADMIN — DOXYCYCLINE HYCLATE 100 MG: 100 CAPSULE ORAL at 09:02

## 2020-02-16 RX ADMIN — DIPHENHYDRAMINE HYDROCHLORIDE 25 MG: 25 CAPSULE ORAL at 10:02

## 2020-02-16 RX ADMIN — HYDROCODONE BITARTRATE AND ACETAMINOPHEN 2 TABLET: 5; 325 TABLET ORAL at 09:02

## 2020-02-16 RX ADMIN — LEVOTHYROXINE SODIUM 88 MCG: 88 TABLET ORAL at 06:02

## 2020-02-16 RX ADMIN — SODIUM CHLORIDE: 0.9 INJECTION, SOLUTION INTRAVENOUS at 10:02

## 2020-02-16 RX ADMIN — DIPHENHYDRAMINE HYDROCHLORIDE 12.5 MG: 50 INJECTION INTRAMUSCULAR; INTRAVENOUS at 12:02

## 2020-02-16 RX ADMIN — HYDROCODONE BITARTRATE AND ACETAMINOPHEN 2 TABLET: 5; 325 TABLET ORAL at 05:02

## 2020-02-16 RX ADMIN — HYDROCODONE BITARTRATE AND ACETAMINOPHEN 2 TABLET: 5; 325 TABLET ORAL at 11:02

## 2020-02-16 RX ADMIN — POTASSIUM CHLORIDE 20 MEQ: 20 TABLET, EXTENDED RELEASE ORAL at 10:02

## 2020-02-16 RX ADMIN — HYDROCODONE BITARTRATE AND ACETAMINOPHEN 1 TABLET: 5; 325 TABLET ORAL at 06:02

## 2020-02-16 RX ADMIN — MAGNESIUM OXIDE 800 MG: 400 TABLET ORAL at 10:02

## 2020-02-16 NOTE — PROGRESS NOTES
On license of [facility] Medicine  Progress Note    Patient Name: Shara Hutchins  MRN: [id number]  Patient Class: IP- Inpatient   Admission Date: 2/10/2020  Length of Stay: 3 days  Attending Physician: Grant Ravi MD  Primary Care Provider: April Horton MD        Subjective:     Principal Problem:Pneumatosis intestinalis      Interval History:  Afebrile past 24 hours on oral antibiotics. Off PCA pump this morning and pain well tolerated with PO pain medications. Patient tolerated clears yesterday and requested regular diet today which patient tolerated. Discontinue IVF. Denies nausea or emesis. Having output into ileostomy bag. Patient hasn't been out of bed since surgery. PT consulted. Plan to discontinue lee if patient ambulates. Also reports pain in groin area has improved.  Wound care is not available today to teach patient how to change in ostomy bag.    Review of Systems   Constitutional: Negative for chills, fatigue, fever and unexpected weight change.   HENT: Negative for sore throat.    Eyes: Negative for visual disturbance.   Respiratory: Negative for cough, chest tightness and shortness of breath.    Cardiovascular: Negative for chest pain.   Gastrointestinal: Negative for abdominal pain, constipation, diarrhea, nausea and vomiting.   Endocrine: Negative for polyuria.   Genitourinary: Negative for dysuria and hematuria.   Neurological: Negative for headaches.     Objective:     Vital Signs (Most Recent):  Temp: 98.3 °F (36.8 °C) (02/16/20 1143)  Pulse: 71 (02/16/20 1143)  Resp: 16 (02/16/20 1143)  BP: 130/63 (02/16/20 1143)  SpO2: 95 % (02/16/20 1143) Vital Signs (24h Range):  Temp:  [98.2 °F (36.8 °C)-98.6 °F (37 °C)] 98.3 °F (36.8 °C)  Pulse:  [71-76] 71  Resp:  [16] 16  SpO2:  [95 %-99 %] 95 %  BP: (118-134)/(63-73) 130/63     Weight: 56.7 kg (125 lb)  Body mass index is 19.58 kg/m².    Intake/Output Summary (Last 24 hours) at 2/16/2020 1255  Last data filed at 2/16/2020 1021  Gross  per 24 hour   Intake 18.4 ml   Output 3050 ml   Net -3031.6 ml      Physical Exam   Constitutional: She is oriented to person, place, and time. No distress.   Thin appearing   HENT:   Right Ear: External ear normal.   Left Ear: External ear normal.   Eyes: Conjunctivae and EOM are normal. Right eye exhibits no discharge. Left eye exhibits no discharge. No scleral icterus.   Neck: Normal range of motion.   Cardiovascular: Normal rate, regular rhythm, normal heart sounds and intact distal pulses. Exam reveals no gallop and no friction rub.   No murmur heard.  Pulmonary/Chest: Effort normal and breath sounds normal. No respiratory distress. She has no wheezes. She has no rales. She exhibits no tenderness.   Abdominal: Soft.   S/p ileostomy   Genitourinary: Pelvic exam was performed with patient prone.   Musculoskeletal: Normal range of motion. She exhibits no edema or tenderness.   Neurological: She is alert and oriented to person, place, and time.   Skin: Skin is warm. She is not diaphoretic.   Psychiatric: She has a normal mood and affect. Her behavior is normal. Judgment and thought content normal.   Nursing note and vitals reviewed.      Significant Labs:   CBC:   Recent Labs   Lab 02/15/20  0604 02/16/20  0530 02/16/20  0959   WBC 7.47 5.41  --    HGB 7.8* 7.3* 7.5*   HCT 25.1* 24.1* 24.1*   * 124*  --      CMP:   Recent Labs   Lab 02/15/20  0604 02/16/20  0530    139   K 3.7 3.6    109   CO2 22* 23   GLU 94 67*   BUN 24* 25*   CREATININE 2.7* 2.5*   CALCIUM 7.9* 7.6*   ANIONGAP 8 7*   EGFRNONAA 17.3* 19.0*           Assessment/Plan:      Active Hospital Problems    Diagnosis    *Pneumatosis intestinalis    Cellulitis    Nausea & vomiting    Diarrhea    Acute renal failure superimposed on stage 5 chronic kidney disease, not on chronic dialysis    Hypothyroidism    Anemia of chronic disease    Hisotry of Vulvar cancer    Stage 5 chronic kidney disease not on chronic dialysis     Inflammatory bowel disease       Cellulitis  With history of vulvar cancer, cannot rule out recurrence  IV Meropenem, vancomycin x3 days  Now PO antibiotics  Improving  After d/c, oncology will refer to Dr. Aldana, patient's previous oncologist who treated her previously    Dr. Francis consulted  Dr. Lovell consulted  Dr. Dietz consulted  Appreciate consultant's      Nausea & vomiting  Concerns for pneumatosis intestinalis on CT, however abdomen is benign on admission  Repeat CT continues to have concerns  Ex lap on 2/13 found no evidence of pneumatosis intestinalis.  Adhesions lysed  S/p loop ileostomy on 02/13  Antiemetics  PO pain med  Resolved  Regular diet  Stool, C diff neg    VTE Risk Mitigation (From admission, onward)         Ordered     Place sequential compression device  Until discontinued      02/14/20 2237     enoxaparin injection 30 mg  Daily      02/10/20 1748     IP VTE HIGH RISK PATIENT  Once      02/10/20 1721                      Grant Ravi MD  Department of Hospital Medicine   [facility]  Date of service: 02/16/2020 1:04 PM

## 2020-02-16 NOTE — PLAN OF CARE
Problem: Infection  Goal: Infection Symptom Resolution  Outcome: Ongoing, Progressing     Problem: Fall Injury Risk  Goal: Absence of Fall and Fall-Related Injury  Outcome: Ongoing, Progressing     Problem: Adult Inpatient Plan of Care  Goal: Plan of Care Review  Outcome: Ongoing, Progressing     Problem: Adult Inpatient Plan of Care  Goal: Patient-Specific Goal (Individualization)  Outcome: Ongoing, Progressing     Problem: Adult Inpatient Plan of Care  Goal: Absence of Hospital-Acquired Illness or Injury  Outcome: Ongoing, Progressing

## 2020-02-16 NOTE — ASSESSMENT & PLAN NOTE
With history of vulvar cancer, cannot rule out recurrence  IV Meropenem, vancomycin x3 days  Now PO antibiotics  Improving  After d/c, oncology will refer to Dr. Aldana, patient's previous oncologist who treated her previously    Dr. Francis consulted  Dr. Lovell consulted  Dr. Dietz consulted  Appreciate consultant's

## 2020-02-16 NOTE — ANESTHESIA POST-OP PAIN MANAGEMENT
Acute Pain Service Progress Note    Postop day 3 status post colectomy.  Patient reports fair analgesia with PCA hydromorphone (9 mg over the past 22 hr).  Patient is tolerating full liquids and has not had any nausea since Zofran yesterday morning.  Benadryl yesterday only made her sleepy and did not really relieve her itching.  If itching persists, consider Nubain.  However, it may decrease with discontinuation of PCA, which I am doing this morning.  We will start hydrocodone, which patient says works well for her and she can take on an empty stomach.  Re-consult if needed.

## 2020-02-16 NOTE — PLAN OF CARE
Problem: Physical Therapy Goal  Goal: Physical Therapy Goal  Description  Goals to be met by: 2020     Patient will increase functional independence with mobility by performin. Supine to sit with Contact Guard Assistance  2. Sit to stand transfer with Contact Guard Assistance  3. Bed to chair transfer with Contact Guard Assistance using Rolling Walker  4. Gait  x 250 feet with Contact Guard Assistance using Rolling Walker.   5 . Lower extremity exercise program x20 reps   Outcome: Ongoing, Progressing   PT eval completed- gait with RW 150ft with min assist. OOB to chair. HHPT

## 2020-02-16 NOTE — PT/OT/SLP EVAL
Physical Therapy Evaluation    Patient Name:  Shara Hutchins   MRN:  [id number]    Recommendations:     Discharge Recommendations:  home health PT   Discharge Equipment Recommendations: walker, rolling   Barriers to discharge: None    Assessment:     Shara Hutchins is a 69 y.o. female admitted with a medical diagnosis of Pneumatosis intestinalis.  She presents with the following impairments/functional limitations:  weakness, impaired endurance, impaired self care skills, impaired functional mobilty, pain, gait instability . Pt tolerated ambulation at hallways 150ft with IV. OOB to chair. Pt to benefit from HHPT and RW. Pt to stay with daughter upon discharge.    Rehab Prognosis: Good; patient would benefit from acute skilled PT services to address these deficits and reach maximum level of function.    Recent Surgery: Procedure(s) (LRB):  LAPAROSCOPY, DIAGNOSTIC (N/A)  LAPAROTOMY, EXPLORATORY (N/A)  REMOVAL, VASCULAR ACCESS PORT (Right)  CREATION, ILEOSTOMY Loop (N/A)  LYSIS, ADHESIONS (N/A) 3 Days Post-Op    Plan:     During this hospitalization, patient to be seen 6 x/week to address the identified rehab impairments via gait training, therapeutic activities, therapeutic exercises and progress toward the following goals:    · Plan of Care Expires:  03/27/20    Subjective   Pt stated that she has not been oob   Stated is home alone but will stay with daughter  Chief Complaint: abdominal discomfort  Patient/Family Comments/goals: get well  Pain/Comfort:  · Pain Rating 1: (did not rate)  · Location 1: abdomen  · Pain Addressed 1: Distraction, Cessation of Activity    Patients cultural, spiritual, Rastafari conflicts given the current situation:      Living Environment:  Home alone  Prior to admission, patients level of function was independent/drives.  Equipment used at home: none.  DME owned (not currently used): none.  Upon discharge, patient will have assistance from family.    Objective:     Communicated with nurse Quevedo  prior to session.  Patient found HOB elevated with peripheral IV, telemetry  upon PT entry to room.    General Precautions: Standard, fall   Orthopedic Precautions:N/A   Braces: N/A     Exams:  · RLE ROM: WFL  · RLE Strength: WFL  · LLE ROM: WFL  · LLE Strength: WFL    Functional Mobility:  · Bed Mobility:     · Rolling Left:  minimum assistance  · Scooting: minimum assistance  · Supine to Sit: minimum assistance  · Transfers:     · Sit to Stand:  minimum assistance with rolling walker  · Bed to Chair: minimum assistance with  rolling walker  using  Stand Pivot  · Gait: 150ft with RW min assist      Therapeutic Activities and Exercises:   OOB to chair post PT with all needs within reach    AM-PAC 6 CLICK MOBILITY  Total Score:17     Patient left up in chair with all lines intact and call button in reach.    GOALS:   Multidisciplinary Problems     Physical Therapy Goals        Problem: Physical Therapy Goal    Goal Priority Disciplines Outcome Goal Variances Interventions   Physical Therapy Goal     PT, PT/OT Ongoing, Progressing     Description:  Goals to be met by: 2020     Patient will increase functional independence with mobility by performin. Supine to sit with Contact Guard Assistance  2. Sit to stand transfer with Contact Guard Assistance  3. Bed to chair transfer with Contact Guard Assistance using Rolling Walker  4. Gait  x 250 feet with Contact Guard Assistance using Rolling Walker.   5 . Lower extremity exercise program x20 reps                    History:     Past Medical History:   Diagnosis Date    Chronic kidney disease, stage III (moderate) 2018    Diabetes mellitus, type 2     Fatigue     Hypertension     Iron deficiency anemia due to chronic blood loss 2018    Vulvar cancer 2019       Past Surgical History:   Procedure Laterality Date    ABDOMINAL SURGERY      BACK SURGERY      CERVICAL FUSION      x 4    cervix repair      CHOLECYSTECTOMY  2000    DIAGNOSTIC  LAPAROSCOPY N/A 2/13/2020    Procedure: LAPAROSCOPY, DIAGNOSTIC;  Surgeon: Robbi Lovell III, MD;  Location: [facility];  Service: General;  Laterality: N/A;    HYSTERECTOMY  1980    ILEOSTOMY N/A 2/13/2020    Procedure: CREATION, ILEOSTOMY Loop;  Surgeon: Robbi Lovell III, MD;  Location: [facility];  Service: General;  Laterality: N/A;    LUMBAR LAMINECTOMY      LYSIS OF ADHESIONS N/A 2/13/2020    Procedure: LYSIS, ADHESIONS;  Surgeon: Robbi Lovell III, MD;  Location: [facility];  Service: General;  Laterality: N/A;    NECK SURGERY      REMOVAL OF VASCULAR ACCESS PORT Right 2/13/2020    Procedure: REMOVAL, VASCULAR ACCESS PORT;  Surgeon: Robbi Lovell III, MD;  Location: [facility];  Service: General;  Laterality: Right;    SHOULDER SURGERY      vulva cancer         Time Tracking:     PT Received On: 02/16/20  PT Start Time: 1045     PT Stop Time: 1108  PT Total Time (min): 23 min     Billable Minutes: Evaluation 10 and Gait Training 13      Amina Ortez, PT  02/16/2020

## 2020-02-16 NOTE — PROGRESS NOTES
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  Patient reports that she has been having diarrhea multiple times a day for several years.  Reports that last Friday she was in usual health when she some developed some nausea and nonbloody nonbilious emesis and increased diarrhea.  Denies any fever, chills, abdominal pain. Patient reports that she had decreased urine output over the weekend and has not had any significant output since Saturday night or Sunday morning.  Patient reports that her diarrhea denied improved however nausea somewhat improved.  Patient cannot tolerate anymore and came into the ED for further evaluation.  Patient also reports that she has been having worsening pain in arm volar area.  Reports that she had history of vulvar cancer in the past and she feels like it has returned.     In the ED, patient received Diflucan, meropenem, vancomycin in the ED.  Patient was afebrile, without leukocytosis.  Lactic acid was unremarkable.  CT abdomen found possible pneumatosis intestinalis however patient had benign abdominal exam.  General surgery was at bedside on admission.    2/11 pt's nausea is better.  No chest pain or sob.  She wants to eat.  She has chronic diarrhea.  She his pain in the vaginal area.  No focal neurologic complaints.  Some vague right sided abd discomfort    2/12  still with abdominal pain  2/13 no cp or sob.    2/14 sleeping    2/15 sleeping.  No distress  2/16 no sob.  Has post op pain.  No chest pain    Plan of Care:       Assessment:    Acute kidney injury due to volume depletion-  --cr stable  --no nsaids, coxibs  --keep sbp above 90  --no acute indication for hd    Anemia  --epogen ordered       Volume depletion due to gi losses  --now looking a little overloaded.  Heplock iv              Thank you for allowing us to participate in this patient's care. We will continue to follow.    Vital Signs:  Temp Readings from Last 3 Encounters:   02/16/20 98.3 °F (36.8 °C) (Oral)   02/05/20 98.3 °F (36.8 °C)   01/22/20 97.8 °F (36.6 °C)       Pulse Readings from Last 3 Encounters:   02/16/20 71   02/05/20 87   01/22/20 (!) 55       BP Readings from Last 3 Encounters:   02/16/20 134/73   02/05/20 112/64   01/22/20 (!) 84/51       Weight:  Wt Readings from Last 3 Encounters:   02/14/20 56.7 kg (125 lb)   02/05/20 56.9 kg (125 lb 6.4 oz)   01/22/20 57.2 kg (126 lb)       Past Medical & Surgical History:  Past Medical History:   Diagnosis Date    Chronic kidney disease, stage III (moderate) 1/7/2018    Diabetes mellitus, type 2     Fatigue     Hypertension     Iron deficiency anemia due to chronic blood loss 1/7/2018    Vulvar cancer 1/7/2019       Past Surgical History:   Procedure Laterality Date    ABDOMINAL SURGERY      BACK SURGERY      CERVICAL FUSION      x 4    cervix repair      CHOLECYSTECTOMY  2000    DIAGNOSTIC LAPAROSCOPY N/A 2/13/2020    Procedure: LAPAROSCOPY, DIAGNOSTIC;  Surgeon: Robbi Lovell III, MD;  Location: [facility] OR;  Service: General;  Laterality: N/A;    HYSTERECTOMY  1980    ILEOSTOMY N/A 2/13/2020    Procedure: CREATION, ILEOSTOMY Loop;  Surgeon: Robbi Lovell III, MD;  Location: [facility] OR;  Service: General;  Laterality: N/A;    LUMBAR LAMINECTOMY      LYSIS OF ADHESIONS N/A 2/13/2020    Procedure: LYSIS, ADHESIONS;  Surgeon: Robbi  JESUS Lovell III, MD;  Location: [facility];  Service: General;  Laterality: N/A;    NECK SURGERY      REMOVAL OF VASCULAR ACCESS PORT Right 2020    Procedure: REMOVAL, VASCULAR ACCESS PORT;  Surgeon: Robbi Lovell III, MD;  Location: [facility];  Service: General;  Laterality: Right;    SHOULDER SURGERY      vulva cancer         Past Social History:  Social History     Socioeconomic History    Marital status:      Spouse name: Not on file    Number of children: Not on file    Years of education: Not on file    Highest education level: Not on file   Occupational History    Not on file   Social Needs    Financial resource strain: Not on file    Food insecurity:     Worry: Not on file     Inability: Not on file    Transportation needs:     Medical: Not on file     Non-medical: Not on file   Tobacco Use    Smoking status: Former Smoker     Packs/day: 1.00     Years: 33.00     Pack years: 33.00     Types: Cigarettes     Last attempt to quit: 2000     Years since quittin.5    Smokeless tobacco: Never Used   Substance and Sexual Activity    Alcohol use: No    Drug use: No    Sexual activity: Not on file   Lifestyle    Physical activity:     Days per week: Not on file     Minutes per session: Not on file    Stress: Not on file   Relationships    Social connections:     Talks on phone: Not on file     Gets together: Not on file     Attends Jain service: Not on file     Active member of club or organization: Not on file     Attends meetings of clubs or organizations: Not on file     Relationship status: Not on file   Other Topics Concern    Not on file   Social History Narrative    Not on file       Medications:  No current facility-administered medications on file prior to encounter.      Current Outpatient Medications on File Prior to Encounter   Medication Sig Dispense Refill    calcitRIOL (ROCALTROL) 0.5 MCG Cap Take 1 capsule by mouth once daily.  3    dicyclomine  "(BENTYL) 10 MG capsule TAKE 1 CAPSULE THREE TIMES A DAY (Patient taking differently: Take 10 mg by mouth 3 (three) times daily. ) 270 capsule 4    diphenhydrAMINE (BENADRYL) 25 mg capsule Take 25 mg by mouth nightly as needed for Itching.      levothyroxine (SYNTHROID) 88 MCG tablet TAKE 1 TABLET DAILY (Patient taking differently: Take 88 mcg by mouth every morning. ) 90 tablet 4    magnesium oxide (MAG-OX) 400 mg (241.3 mg magnesium) tablet Take 400 mg by mouth 2 (two) times daily.      potassium chloride (KLOR-CON) 10 MEQ TbSR Take 20 mEq by mouth 2 (two) times daily.       promethazine (PHENERGAN) 25 MG tablet Take 1 tablet (25 mg total) by mouth daily as needed for Nausea. 90 tablet 0    sertraline (ZOLOFT) 25 MG tablet Take 25 mg by mouth once daily.      sodium bicarbonate 650 MG tablet Take 650 mg by mouth 3 (three) times daily.   2    traMADol (ULTRAM) 50 mg tablet Take 1 tablet (50 mg total) by mouth every 4 (four) hours as needed for Pain. 42 tablet 0     Scheduled Meds:   doxycycline  100 mg Oral Q12H    enoxaparin  30 mg Subcutaneous Daily    levothyroxine  88 mcg Oral QAM    mupirocin  1 g Nasal BID    polyethylene glycol  17 g Oral Daily    sertraline  25 mg Oral Daily     Continuous Infusions:   sodium chloride 0.9% 125 mL/hr at 02/15/20 1859     PRN Meds:.acetaminophen, acetaminophen, albuterol-ipratropium, diphenhydrAMINE, HYDROcodone-acetaminophen, HYDROmorphone, melatonin, naloxone, ondansetron, ondansetron, promethazine (PHENERGAN) IVPB, sodium chloride 0.9%, sodium chloride 0.9%    Allergies:  Ciprofloxacin and Pcn [penicillins]    Past Family History:  Reviewed; refer to Hospitalist Admission Note    Review of Systems:  Review of Systems - All 14 systems reviewed and negative, except as noted in HPI    Physical Exam:    /73 (BP Location: Right arm, Patient Position: Lying)   Pulse 71   Temp 98.3 °F (36.8 °C) (Oral)   Resp 16   Ht 5' 7" (1.702 m)   Wt 56.7 kg (125 lb)  "  SpO2 95%   Breastfeeding? No   BMI 19.58 kg/m²     General Appearance:    sleeping   Head:    Normocephalic, without obvious abnormality, atraumatic   Eyes:    PER, conjunctiva/corneas clear, EOM's intact in both eyes        Throat:   Lips, mucosa, and tongue normal; teeth and gums normal   Back:     Symmetric, no curvature, ROM normal, no CVA tenderness   Lungs:     Clear to auscultation bilaterally, respirations unlabored   Chest wall:    No tenderness or deformity   Heart:    Regular rate and rhythm, S1 and S2 normal, no murmur, rub   or gallop   Abdomen:     Soft, non-tender, bowel sounds active all four quadrants,     no masses, no organomegaly   Extremities:    edematous   Pulses:   2+ and symmetric all extremities   MSK:   No joint or muscle swelling, tenderness or deformity   Skin:   Skin color, texture, turgor normal, no rashes or lesions   Neurologic:   sleeping     Results:  Lab Results   Component Value Date     02/16/2020    K 3.6 02/16/2020     02/16/2020    CO2 23 02/16/2020    BUN 25 (H) 02/16/2020    CREATININE 2.5 (H) 02/16/2020    CALCIUM 7.6 (L) 02/16/2020    ANIONGAP 7 (L) 02/16/2020    ESTGFRAFRICA 21.9 (A) 02/16/2020    EGFRNONAA 19.0 (A) 02/16/2020       Lab Results   Component Value Date    CALCIUM 7.6 (L) 02/16/2020    PHOS 3.4 02/16/2020       Recent Labs   Lab 02/16/20  0530   WBC 5.41   RBC 2.33*   HGB 7.3*   HCT 24.1*   *   *   MCH 31.3*   MCHC 30.3*       I have personally reviewed pertinent radiological imaging and reports.

## 2020-02-16 NOTE — PLAN OF CARE
02/15/20 2200   Patient Assessment/Suction   Level of Consciousness (AVPU) alert   Respiratory Effort Unlabored   Expansion/Accessory Muscles/Retractions no use of accessory muscles   All Lung Fields Breath Sounds clear   Rhythm/Pattern, Respiratory unlabored   Cough Frequency infrequent   Cough Type nonproductive   PRE-TX-O2   O2 Device (Oxygen Therapy) room air   SpO2 97 %   Pulse 74   Resp 16   ETCO2   $ ETCO2 Charge Exhaled CO2 Monitoring   $ ETCO2 Usage Currently wearing   ETCO2 (mmHg) 37 mmHg   ETCO2 Device Type Portable Bedside Monitor   Aerosol Therapy   $ Aerosol Therapy Charges PRN treatment not required   Respiratory Treatment Status (SVN) PRN treatment not required

## 2020-02-16 NOTE — PLAN OF CARE
02/16/20 0717   Patient Assessment/Suction   Level of Consciousness (AVPU) alert   Respiratory Effort Unlabored;Normal   Expansion/Accessory Muscles/Retractions no use of accessory muscles;no retractions;expansion symmetric   All Lung Fields Breath Sounds clear   Rhythm/Pattern, Respiratory pattern regular;depth regular;no shortness of breath reported   Cough Frequency infrequent   Cough Type good   PRE-TX-O2   O2 Device (Oxygen Therapy) room air   SpO2 95 %   Pulse Oximetry Type Continuous   $ Pulse Oximetry - Multiple Charge Pulse Oximetry - Multiple   ETCO2   $ ETCO2 Charge Exhaled CO2 Monitoring   $ ETCO2 Usage Currently wearing   ETCO2 (mmHg) 38 mmHg   ETCO2 Device Type Portable Bedside Monitor   Aerosol Therapy   $ Aerosol Therapy Charges PRN treatment not required

## 2020-02-16 NOTE — ASSESSMENT & PLAN NOTE
Concerns for pneumatosis intestinalis on CT, however abdomen is benign on admission  Repeat CT continues to have concerns  Ex lap on 2/13 found no evidence of pneumatosis intestinalis.  Adhesions lysed  S/p loop ileostomy on 02/13  Antiemetics  PO pain med  Resolved  Regular diet  Stool, C diff neg

## 2020-02-16 NOTE — PLAN OF CARE
Problem: Infection  Goal: Infection Symptom Resolution  Outcome: Ongoing, Progressing     Problem: Fall Injury Risk  Goal: Absence of Fall and Fall-Related Injury  Outcome: Ongoing, Progressing     Problem: Adult Inpatient Plan of Care  Goal: Plan of Care Review  Outcome: Ongoing, Progressing  Goal: Patient-Specific Goal (Individualization)  Outcome: Ongoing, Progressing  Goal: Absence of Hospital-Acquired Illness or Injury  Outcome: Ongoing, Progressing  Goal: Optimal Comfort and Wellbeing  Outcome: Ongoing, Progressing  Goal: Readiness for Transition of Care  Outcome: Ongoing, Progressing  Goal: Rounds/Family Conference  Outcome: Ongoing, Progressing     Problem: Skin Injury Risk Increased  Goal: Skin Health and Integrity  Outcome: Ongoing, Progressing     Problem: Oral Intake Inadequate  Goal: Improved Oral Intake  Outcome: Ongoing, Progressing

## 2020-02-16 NOTE — SUBJECTIVE & OBJECTIVE
Interval History:  Afebrile past 24 hours on oral antibiotics. Off PCA pump this morning and pain well tolerated with PO pain medications. Patient tolerated clears yesterday and requested regular diet today which patient tolerated. Discontinue IVF. Denies nausea or emesis. Having output into ileostomy bag. Patient hasn't been out of bed since surgery. PT consulted. Plan to discontinue lee if patient ambulates. Also reports pain in groin area has improved.  Wound care is not available today to teach patient how to change in ostomy bag.    Review of Systems   Constitutional: Negative for chills, fatigue, fever and unexpected weight change.   HENT: Negative for sore throat.    Eyes: Negative for visual disturbance.   Respiratory: Negative for cough, chest tightness and shortness of breath.    Cardiovascular: Negative for chest pain.   Gastrointestinal: Negative for abdominal pain, constipation, diarrhea, nausea and vomiting.   Endocrine: Negative for polyuria.   Genitourinary: Negative for dysuria and hematuria.   Neurological: Negative for headaches.     Objective:     Vital Signs (Most Recent):  Temp: 98.3 °F (36.8 °C) (02/16/20 1143)  Pulse: 71 (02/16/20 1143)  Resp: 16 (02/16/20 1143)  BP: 130/63 (02/16/20 1143)  SpO2: 95 % (02/16/20 1143) Vital Signs (24h Range):  Temp:  [98.2 °F (36.8 °C)-98.6 °F (37 °C)] 98.3 °F (36.8 °C)  Pulse:  [71-76] 71  Resp:  [16] 16  SpO2:  [95 %-99 %] 95 %  BP: (118-134)/(63-73) 130/63     Weight: 56.7 kg (125 lb)  Body mass index is 19.58 kg/m².    Intake/Output Summary (Last 24 hours) at 2/16/2020 1255  Last data filed at 2/16/2020 1021  Gross per 24 hour   Intake 18.4 ml   Output 3050 ml   Net -3031.6 ml      Physical Exam   Constitutional: She is oriented to person, place, and time. No distress.   Thin appearing   HENT:   Right Ear: External ear normal.   Left Ear: External ear normal.   Eyes: Conjunctivae and EOM are normal. Right eye exhibits no discharge. Left eye exhibits no  discharge. No scleral icterus.   Neck: Normal range of motion.   Cardiovascular: Normal rate, regular rhythm, normal heart sounds and intact distal pulses. Exam reveals no gallop and no friction rub.   No murmur heard.  Pulmonary/Chest: Effort normal and breath sounds normal. No respiratory distress. She has no wheezes. She has no rales. She exhibits no tenderness.   Abdominal: Soft.   S/p ileostomy   Genitourinary: Pelvic exam was performed with patient prone.   Musculoskeletal: Normal range of motion. She exhibits no edema or tenderness.   Neurological: She is alert and oriented to person, place, and time.   Skin: Skin is warm. She is not diaphoretic.   Psychiatric: She has a normal mood and affect. Her behavior is normal. Judgment and thought content normal.   Nursing note and vitals reviewed.      Significant Labs:   CBC:   Recent Labs   Lab 02/15/20  0604 02/16/20  0530 02/16/20  0959   WBC 7.47 5.41  --    HGB 7.8* 7.3* 7.5*   HCT 25.1* 24.1* 24.1*   * 124*  --      CMP:   Recent Labs   Lab 02/15/20  0604 02/16/20  0530    139   K 3.7 3.6    109   CO2 22* 23   GLU 94 67*   BUN 24* 25*   CREATININE 2.7* 2.5*   CALCIUM 7.9* 7.6*   ANIONGAP 8 7*   EGFRNONAA 17.3* 19.0*

## 2020-02-17 VITALS
HEART RATE: 72 BPM | BODY MASS INDEX: 19.62 KG/M2 | WEIGHT: 125 LBS | SYSTOLIC BLOOD PRESSURE: 153 MMHG | TEMPERATURE: 98 F | OXYGEN SATURATION: 97 % | DIASTOLIC BLOOD PRESSURE: 81 MMHG | RESPIRATION RATE: 17 BRPM | HEIGHT: 67 IN

## 2020-02-17 PROBLEM — Z93.2 S/P ILEOSTOMY: Status: ACTIVE | Noted: 2020-02-13

## 2020-02-17 LAB
ABO + RH BLD: NORMAL
ANION GAP SERPL CALC-SCNC: 6 MMOL/L (ref 8–16)
BLD GP AB SCN CELLS X3 SERPL QL: NORMAL
BUN SERPL-MCNC: 24 MG/DL (ref 8–23)
CALCIUM SERPL-MCNC: 8.2 MG/DL (ref 8.7–10.5)
CHLORIDE SERPL-SCNC: 110 MMOL/L (ref 95–110)
CO2 SERPL-SCNC: 26 MMOL/L (ref 23–29)
CREAT SERPL-MCNC: 2.3 MG/DL (ref 0.5–1.4)
ERYTHROCYTE [DISTWIDTH] IN BLOOD BY AUTOMATED COUNT: 15.7 % (ref 11.5–14.5)
EST. GFR  (AFRICAN AMERICAN): 24.3 ML/MIN/1.73 M^2
EST. GFR  (NON AFRICAN AMERICAN): 21.1 ML/MIN/1.73 M^2
GLUCOSE SERPL-MCNC: 81 MG/DL (ref 70–110)
HCT VFR BLD AUTO: 25.9 % (ref 37–48.5)
HGB BLD-MCNC: 8.1 G/DL (ref 12–16)
MAGNESIUM SERPL-MCNC: 1.5 MG/DL (ref 1.6–2.6)
MCH RBC QN AUTO: 31.9 PG (ref 27–31)
MCHC RBC AUTO-ENTMCNC: 31.3 G/DL (ref 32–36)
MCV RBC AUTO: 102 FL (ref 82–98)
PHOSPHATE SERPL-MCNC: 2.6 MG/DL (ref 2.7–4.5)
PLATELET # BLD AUTO: 165 K/UL (ref 150–350)
PMV BLD AUTO: 11.3 FL (ref 9.2–12.9)
POTASSIUM SERPL-SCNC: 3.9 MMOL/L (ref 3.5–5.1)
RBC # BLD AUTO: 2.54 M/UL (ref 4–5.4)
SODIUM SERPL-SCNC: 142 MMOL/L (ref 136–145)
WBC # BLD AUTO: 5.73 K/UL (ref 3.9–12.7)

## 2020-02-17 PROCEDURE — 84100 ASSAY OF PHOSPHORUS: CPT

## 2020-02-17 PROCEDURE — 83735 ASSAY OF MAGNESIUM: CPT

## 2020-02-17 PROCEDURE — 25000003 PHARM REV CODE 250: Performed by: ANESTHESIOLOGY

## 2020-02-17 PROCEDURE — 25000003 PHARM REV CODE 250: Performed by: FAMILY MEDICINE

## 2020-02-17 PROCEDURE — 85027 COMPLETE CBC AUTOMATED: CPT

## 2020-02-17 PROCEDURE — 94761 N-INVAS EAR/PLS OXIMETRY MLT: CPT

## 2020-02-17 PROCEDURE — 80048 BASIC METABOLIC PNL TOTAL CA: CPT

## 2020-02-17 PROCEDURE — 86900 BLOOD TYPING SEROLOGIC ABO: CPT

## 2020-02-17 PROCEDURE — 25000003 PHARM REV CODE 250: Performed by: SURGERY

## 2020-02-17 PROCEDURE — 25000003 PHARM REV CODE 250: Performed by: INTERNAL MEDICINE

## 2020-02-17 PROCEDURE — 99900035 HC TECH TIME PER 15 MIN (STAT)

## 2020-02-17 RX ORDER — DOXYCYCLINE 100 MG/1
100 CAPSULE ORAL EVERY 12 HOURS
Qty: 6 CAPSULE | Refills: 0 | Status: SHIPPED | OUTPATIENT
Start: 2020-02-17 | End: 2020-02-17

## 2020-02-17 RX ORDER — HYDROCODONE BITARTRATE AND ACETAMINOPHEN 10; 325 MG/1; MG/1
1 TABLET ORAL EVERY 6 HOURS PRN
Qty: 30 TABLET | Refills: 0 | Status: ON HOLD | OUTPATIENT
Start: 2020-02-17 | End: 2020-03-05 | Stop reason: HOSPADM

## 2020-02-17 RX ORDER — DOXYCYCLINE 100 MG/1
100 CAPSULE ORAL EVERY 12 HOURS
Qty: 6 CAPSULE | Refills: 0 | Status: SHIPPED | OUTPATIENT
Start: 2020-02-17 | End: 2020-02-20

## 2020-02-17 RX ORDER — HYDROCODONE BITARTRATE AND ACETAMINOPHEN 10; 325 MG/1; MG/1
1 TABLET ORAL EVERY 6 HOURS PRN
Qty: 30 TABLET | Refills: 0 | Status: SHIPPED | OUTPATIENT
Start: 2020-02-17 | End: 2020-02-17 | Stop reason: SDUPTHER

## 2020-02-17 RX ADMIN — HYDROCODONE BITARTRATE AND ACETAMINOPHEN 2 TABLET: 5; 325 TABLET ORAL at 05:02

## 2020-02-17 RX ADMIN — DOXYCYCLINE HYCLATE 100 MG: 100 CAPSULE ORAL at 09:02

## 2020-02-17 RX ADMIN — SERTRALINE HYDROCHLORIDE 50 MG: 50 TABLET ORAL at 09:02

## 2020-02-17 RX ADMIN — LEVOTHYROXINE SODIUM 88 MCG: 88 TABLET ORAL at 05:02

## 2020-02-17 NOTE — PROGRESS NOTES
Ileostomy MNT provided to pt by SHERIF. Education documented.    Altagracia Mcdonough RD 02/17/2020 11:09 AM

## 2020-02-17 NOTE — PROGRESS NOTES
[facility]  General Surgery  Progress Note    Subjective:     History of Present Illness:  No notes on file    Post-Op Info:  Procedure(s) (LRB):  LAPAROSCOPY, DIAGNOSTIC (N/A)  LAPAROTOMY, EXPLORATORY (N/A)  REMOVAL, VASCULAR ACCESS PORT (Right)  CREATION, ILEOSTOMY Loop (N/A)  LYSIS, ADHESIONS (N/A)   3 Days Post-Op     Interval History:   Doing well  Ileostomy is working  Tolerating regular diet      Medications:  Continuous Infusions:  Scheduled Meds:   doxycycline  100 mg Oral Q12H    enoxaparin  30 mg Subcutaneous Daily    levothyroxine  88 mcg Oral QAM    mupirocin  1 g Nasal BID    [START ON 2/17/2020] sertraline  50 mg Oral Daily     PRN Meds:acetaminophen, acetaminophen, albuterol-ipratropium, calcium chloride IVPB, calcium chloride IVPB, calcium chloride IVPB, diphenhydrAMINE, HYDROcodone-acetaminophen, HYDROmorphone, magnesium oxide, magnesium sulfate IVPB, magnesium sulfate IVPB, magnesium sulfate IVPB, magnesium sulfate IVPB, melatonin, naloxone, ondansetron, ondansetron, potassium chloride in water, potassium chloride in water, potassium chloride in water, potassium chloride in water, potassium chloride, potassium chloride, potassium chloride, potassium chloride, promethazine (PHENERGAN) IVPB, sodium chloride 0.9%, sodium chloride 0.9%, sodium phosphate IVPB, sodium phosphate IVPB, sodium phosphate IVPB, sodium phosphate IVPB, sodium phosphate IVPB     Review of patient's allergies indicates:   Allergen Reactions    Ciprofloxacin Other (See Comments)     Elevated liver enzymes      Pcn [penicillins] Anaphylaxis     Objective:     Vital Signs (Most Recent):  Temp: 98.4 °F (36.9 °C) (02/16/20 1619)  Pulse: 71 (02/16/20 1619)  Resp: 16 (02/16/20 1619)  BP: 135/69 (02/16/20 1619)  SpO2: 98 % (02/16/20 1619) Vital Signs (24h Range):  Temp:  [98.3 °F (36.8 °C)-98.6 °F (37 °C)] 98.4 °F (36.9 °C)  Pulse:  [71-75] 71  Resp:  [16] 16  SpO2:  [95 %-98 %] 98 %  BP: (118-135)/(63-73) 135/31      Weight: 56.7 kg (125 lb)  Body mass index is 19.58 kg/m².    Intake/Output - Last 3 Shifts       02/14 0700 - 02/15 0659 02/15 0700 - 02/16 0659 02/16 0700 - 02/17 0659    I.V. (mL/kg) 15.2 (0.3) 18.4 (0.3)     Total Intake(mL/kg) 15.2 (0.3) 18.4 (0.3)     Urine (mL/kg/hr)  950 (0.7) 1700 (2.3)    Stool  950 300    Blood       Total Output  1900 2000    Net +15.2 -1881.6 -2000                 Physical Exam   Constitutional: She is oriented to person, place, and time. She is cooperative. No distress.   Abdominal: Soft. She exhibits no distension. There is tenderness ( appropriate). There is no rebound and no guarding.   Neurological: She is oriented to person, place, and time.   Skin:   Incisions are clean, dry and intact  There is no evidence of infection, hematoma or seroma          Assessment/Plan:     * Pneumatosis intestinalis  S/p Ileostomy    Ileostomy teaching with stoma nurse tomorrow  Arrange home health for new stoma  Okay to discharge from our standpoint        Melani Ruff MD  General Surgery  [facility]

## 2020-02-17 NOTE — ASSESSMENT & PLAN NOTE
S/p Ileostomy    Ileostomy teaching with stoma nurse tomorrow  Arrange home health for new stoma  Okay to discharge from our standpoint

## 2020-02-17 NOTE — PLAN OF CARE
02/16/20 1918   Patient Assessment/Suction   Level of Consciousness (AVPU) alert   Respiratory Effort Normal;Unlabored   Expansion/Accessory Muscles/Retractions no use of accessory muscles   All Lung Fields Breath Sounds crackles  (scattered crackles rll/clear post deep breathing)   Cough Frequency infrequent   Cough Type good   PRE-TX-O2   O2 Device (Oxygen Therapy) room air   SpO2 97 %   Pulse Oximetry Type Continuous   $ Pulse Oximetry - Multiple Charge Pulse Oximetry - Multiple   Pulse 74   Resp 20   Incentive Spirometer   $ Incentive Spirometer Charges postop instruction   Incentive Spirometer Predicted Level (mL) 2500   Administration (IS) mouthpiece   Number of Repetitions (IS) 10   Level Incentive Spirometer (mL) 2000   Patient Tolerance (IS) good   Respiratory Evaluation   $ Care Plan Tech Time 15 min   Evaluation For   (care plan)   encourage use of incentive

## 2020-02-17 NOTE — SUBJECTIVE & OBJECTIVE
Interval History:   Doing well  Ileostomy is working  Tolerating regular diet      Medications:  Continuous Infusions:  Scheduled Meds:   doxycycline  100 mg Oral Q12H    enoxaparin  30 mg Subcutaneous Daily    levothyroxine  88 mcg Oral QAM    mupirocin  1 g Nasal BID    [START ON 2/17/2020] sertraline  50 mg Oral Daily     PRN Meds:acetaminophen, acetaminophen, albuterol-ipratropium, calcium chloride IVPB, calcium chloride IVPB, calcium chloride IVPB, diphenhydrAMINE, HYDROcodone-acetaminophen, HYDROmorphone, magnesium oxide, magnesium sulfate IVPB, magnesium sulfate IVPB, magnesium sulfate IVPB, magnesium sulfate IVPB, melatonin, naloxone, ondansetron, ondansetron, potassium chloride in water, potassium chloride in water, potassium chloride in water, potassium chloride in water, potassium chloride, potassium chloride, potassium chloride, potassium chloride, promethazine (PHENERGAN) IVPB, sodium chloride 0.9%, sodium chloride 0.9%, sodium phosphate IVPB, sodium phosphate IVPB, sodium phosphate IVPB, sodium phosphate IVPB, sodium phosphate IVPB     Review of patient's allergies indicates:   Allergen Reactions    Ciprofloxacin Other (See Comments)     Elevated liver enzymes      Pcn [penicillins] Anaphylaxis     Objective:     Vital Signs (Most Recent):  Temp: 98.4 °F (36.9 °C) (02/16/20 1619)  Pulse: 71 (02/16/20 1619)  Resp: 16 (02/16/20 1619)  BP: 135/69 (02/16/20 1619)  SpO2: 98 % (02/16/20 1619) Vital Signs (24h Range):  Temp:  [98.3 °F (36.8 °C)-98.6 °F (37 °C)] 98.4 °F (36.9 °C)  Pulse:  [71-75] 71  Resp:  [16] 16  SpO2:  [95 %-98 %] 98 %  BP: (118-135)/(63-73) 135/69     Weight: 56.7 kg (125 lb)  Body mass index is 19.58 kg/m².    Intake/Output - Last 3 Shifts       02/14 0700 - 02/15 0659 02/15 0700 - 02/16 0659 02/16 0700 - 02/17 0659    I.V. (mL/kg) 15.2 (0.3) 18.4 (0.3)     Total Intake(mL/kg) 15.2 (0.3) 18.4 (0.3)     Urine (mL/kg/hr)  950 (0.7) 1700 (2.3)    Stool  950 300    Blood       Total  Output  1900 2000    Net +15.2 -1881.6 -2000                 Physical Exam   Constitutional: She is oriented to person, place, and time. She is cooperative. No distress.   Abdominal: Soft. She exhibits no distension. There is tenderness ( appropriate). There is no rebound and no guarding.   Neurological: She is oriented to person, place, and time.   Skin:   Incisions are clean, dry and intact  There is no evidence of infection, hematoma or seroma

## 2020-02-17 NOTE — PLAN OF CARE
02/17/20 1010   Final Note   Assessment Type Final Discharge Note   Anticipated Discharge Disposition Home-Health       Referral sent to [facility] home health.  CM to follow    Patient choice form signed and scanned into chart.    10:56 Spoke to Zabrina [phone]) with [facility] set up complete

## 2020-02-17 NOTE — DISCHARGE SUMMARY
[facility] Medicine  Discharge Summary      Patient Name: Shara Hutchins  MRN: [id number]  Admission Date: 2/10/2020  Hospital Length of Stay: 4 days  Discharge Date and Time:  02/17/2020 1:25 PM  Attending Physician: Denise att. 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  Patient reports that she has been having diarrhea multiple times a day for several years.  Reports that last Friday she was in usual health when she some developed some nausea and nonbloody nonbilious emesis and increased diarrhea.  Denies any fever, chills, abdominal pain. Patient reports that she had decreased urine output over the weekend and has not had any significant output since Saturday night or Sunday morning.  Patient reports that her diarrhea denied improved however nausea somewhat improved.  Patient cannot tolerate anymore and came into the ED for further evaluation.  Patient also reports that she has been having worsening pain in arm volar area.  Reports that she had history of vulvar cancer in the past and she feels like it has returned.    In the ED, patient received Diflucan, meropenem, vancomycin in the ED.  Patient was afebrile, without leukocytosis.  Lactic acid was unremarkable.  CT abdomen found possible pneumatosis intestinalis however patient had benign abdominal exam.  General surgery was at bedside on admission.    Procedure(s) (LRB):  LAPAROSCOPY, DIAGNOSTIC (N/A)  LAPAROTOMY, EXPLORATORY (N/A)  REMOVAL, VASCULAR ACCESS PORT (Right)  CREATION, ILEOSTOMY Loop (N/A)  LYSIS, ADHESIONS (N/A)      Hospital Course:   Patient was  admitted for concerns of pneumatosis intestinalis seen on CT renal stone study.  However patient's exam was benign.  Patient was also found to have possible cellulitis versus recurrence of vulvar cancer.  Patient was started on IV antibiotics with improved symptoms around the vulvar area.  However continued to appear cancers in nature.  Patient was transitioned to p.o. antibiotics.  Repeat imaging had concerns for pneumatosis intestinalis and patient did endorse having some abdominal pain around the area of concern on imaging.  Patient had a exploratory laparotomy and had adhesions lysed, vascular port removed, and created an ileostomy.  Patient tolerated procedure well.  Patient's symptoms resolved during hospitalization.  Patient was stable for discharge to home with home health and instructions follow up with General surgery, Nephrology, Oncology.    General: Patient resting comfortably in no acute distress. Appears as stated age. Calm. Thin appearing  Eyes: EOM intact. No conjunctivae injection. No scleral icterus.  ENT: Hearing grossly intact. No discharge from ears. No nasal discharge.   CVS: RRR. No LE edema BL.  Lungs: CTA BL, no wheezing or crackles. Good breath sounds. No accessory muscle use. No acute respiratory distress  Ab: s/p ileostomy  Neuro: AOx3. GCS 15. Cranial nerves grossly intact. Moves all extremities equally. Follows commands. Responds appropriately      Consults:   Consults (From admission, onward)        Status Ordering Provider     Inpatient consult to Anesthesiology  Once     Provider:  Moo Shah MD    Acknowledged JUS DOUGLAS III     Inpatient consult to General Surgery  Once     Provider:  Jus Douglas III, MD    Completed GRANT VILLEGAS     Inpatient consult to Hematology Oncology  Once     Provider:  Moo Sauceda MD    Completed GRANT VILLEGAS     Inpatient consult to Hospitalist  Once     Provider:  Grant Villegas MD    Acknowledged  JUS LOVELL III     Inpatient consult to Nephrology  Once     Provider:  Raúl Dietz MD    Acknowledged JUS LOVELL III          No new Assessment & Plan notes have been filed under this hospital service since the last note was generated.  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    Specialties:  General Surgery, Surgery  Why:  For check up s/p hospital discharge, For wound re-check  Contact information:  [address]  [address]  [place] [address]  [phone]             OCTAVIA Frank MD In 1 week.    Specialties:  Hematology, Oncology, Hematology and Oncology  Why:  history Vulvar cancer  Contact information:  [address]  [address]  [place] [address]  [phone]             Raúl Dietz MD In 2 weeks.    Specialty:  Nephrology  Why:  For check up s/p hospital discharge  Contact information:  [address] [place]  [place]  "[id number]  [phone]                 Patient Instructions:      OSTOMY SUPPLIES FOR HOME USE     Order Specific Question Answer Comments   Height: 5' 7" (1.702 m)    Weight: 56.7 kg (125 lb)    Length of need (1-99 months): 99    Diagnosis Ileostomy    Does patient have medical equipment at home? none      Ambulatory referral/consult to Home Health   Standing Status: Future   Referral Priority: Routine Referral Type: Home Health   Referral Reason: Specialty Services Required   Requested Specialty: Home Health Services   Number of Visits Requested: 1     Diet Adult Regular     Notify your health care provider if you experience any of the following:  temperature >100.4     Notify your health care provider if you experience any of the following:  persistent nausea and vomiting or diarrhea     Notify your health care provider if you experience any of the following:  severe uncontrolled pain     Notify your health care provider if you experience any of the following:  redness, tenderness, or signs of infection (pain, swelling, redness, odor or green/yellow discharge around incision site)     Notify your health care provider if you experience any of the following:  difficulty breathing or increased cough     Notify your health care provider if you experience any of the following:  severe persistent headache     Notify your health care provider if you experience any of the following:  worsening rash     Notify your health care provider if you experience any of the following:  persistent dizziness, light-headedness, or visual disturbances     Notify your health care provider if you experience any of the following:  increased confusion or weakness     Activity as tolerated       Significant Diagnostic Studies: Labs:   CMP   Recent Labs   Lab 02/16/20  0530 02/17/20  0428    142   K 3.6 3.9    110   CO2 23 26   GLU 67* 81   BUN 25* 24*   CREATININE 2.5* 2.3*   CALCIUM 7.6* 8.2*   ANIONGAP 7* 6*   ESTGFRAFRICA 21.9* " 24.3*   EGFRNONAA 19.0* 21.1*    and CBC   Recent Labs   Lab 02/16/20  0530 02/16/20  0959 02/17/20  0428   WBC 5.41  --  5.73   HGB 7.3* 7.5* 8.1*   HCT 24.1* 24.1* 25.9*   *  --  165       Pending Diagnostic Studies:     Procedure Component Value Units Date/Time    Vancomycin, random [[id number]] Collected:  02/14/20 1346    Order Status:  Sent Lab Status:  In process Updated:  02/14/20 1346    Specimen:  Blood     Vitamin B6 [[id number]] Collected:  02/12/20 0601    Order Status:  Sent Lab Status:  In process Updated:  02/12/20 0611    Specimen:  Blood          Medications:  Reconciled Home Medications:      Medication List      START taking these medications    doxycycline 100 MG Cap  Commonly known as:  VIBRAMYCIN  Take 1 capsule (100 mg total) by mouth every 12 (twelve) hours. for 3 days     HYDROcodone-acetaminophen  mg per tablet  Commonly known as:  NORCO  Take 1 tablet by mouth every 6 (six) hours as needed for Pain.        CHANGE how you take these medications    levothyroxine 88 MCG tablet  Commonly known as:  SYNTHROID  TAKE 1 TABLET DAILY  What changed:    · when to take this  · additional instructions        CONTINUE taking these medications    calcitRIOL 0.5 MCG Cap  Commonly known as:  ROCALTROL  Take 1 capsule by mouth once daily.     dicyclomine 10 MG capsule  Commonly known as:  BENTYL  TAKE 1 CAPSULE THREE TIMES A DAY     diphenhydrAMINE 25 mg capsule  Commonly known as:  BENADRYL  Take 25 mg by mouth nightly as needed for Itching.     magnesium oxide 400 mg (241.3 mg magnesium) tablet  Commonly known as:  MAG-OX  Take 400 mg by mouth 2 (two) times daily.     potassium chloride 10 MEQ Tbsr  Commonly known as:  KLOR-CON  Take 20 mEq by mouth 2 (two) times daily.     promethazine 25 MG tablet  Commonly known as:  PHENERGAN  Take 1 tablet (25 mg total) by mouth daily as needed for Nausea.     sertraline 25 MG tablet  Commonly known as:  ZOLOFT  Take 25 mg by mouth once daily.     sodium  bicarbonate 650 MG tablet  Take 650 mg by mouth 3 (three) times daily.     traMADol 50 mg tablet  Commonly known as:  ULTRAM  Take 1 tablet (50 mg total) by mouth every 4 (four) hours as needed for Pain.            Indwelling Lines/Drains at time of discharge:   Lines/Drains/Airways     Central Venous Catheter Line                 Percutaneous Central Line Insertion/Assessment - triple lumen  02/12/20 2231 4 days                Time spent on the discharge of patient: 40 minutes  Patient was seen and examined on the date of discharge and determined to be suitable for discharge.         Grant Ravi MD  Department of Hospital Medicine  On license of [facility]  Date of service: 02/17/2020 1:25 PM

## 2020-02-17 NOTE — PROGRESS NOTES
02/17/20 0930        Ileostomy 02/13/20 1600 Loop RLQ   Placement Date/Time: 02/13/20 (c) 1600   Present Prior to Hospital Arrival?: No  Inserted by: MD  Ileostomy Type: Loop  Location: RLQ  Initial Stoma Size (in): (c)    Stoma Appearance oval;rosebud appearance;pink   Stoma Size (in) 72w75xm   Appliance 1-piece;leakage  (over filled broke loose)   Accessories/Skin Care   (moldable ring)   Treatment Bag change   Stoma Function stool;thin liquid   Tolerance no signs/symptoms of discomfort;did not assist w/ appliance change  (watched DVD, instructed pt and cousin(experience with ostomy years ago))   Output (mL) 200 mL   pt set up to watch the DVD on ostomy care by [employer], also kit given with booklet and supplies,  Instructed patient and cousin, on changing, cleaning, showering, where to get supplies,  Ostomy nurse contact  information both here at [facility] and [facility].  Packet of information given on support group, where to obtain supplies, pouches, etc.  Prescriptions given that were signed by Dr Ruff.  Dr Lovell came to see pt prior to discharge.  Instructed to contact Dr. Lovell's office regarding follow up and bridge removal.

## 2020-02-18 ENCOUNTER — TELEPHONE (OUTPATIENT)
Dept: FAMILY MEDICINE | Facility: CLINIC | Age: 70
End: 2020-02-18

## 2020-02-18 LAB — VIT B6 SERPL-MCNC: 3 UG/L (ref 2–32.8)

## 2020-02-19 ENCOUNTER — OFFICE VISIT (OUTPATIENT)
Dept: FAMILY MEDICINE | Facility: CLINIC | Age: 70
End: 2020-02-19
Payer: MEDICARE

## 2020-02-19 ENCOUNTER — INFUSION (OUTPATIENT)
Dept: INFUSION THERAPY | Facility: HOSPITAL | Age: 70
End: 2020-02-19
Attending: INTERNAL MEDICINE
Payer: MEDICARE

## 2020-02-19 VITALS
WEIGHT: 129.31 LBS | OXYGEN SATURATION: 99 % | SYSTOLIC BLOOD PRESSURE: 131 MMHG | RESPIRATION RATE: 16 BRPM | DIASTOLIC BLOOD PRESSURE: 76 MMHG | TEMPERATURE: 98 F | BODY MASS INDEX: 20.25 KG/M2 | HEART RATE: 76 BPM

## 2020-02-19 VITALS
RESPIRATION RATE: 16 BRPM | SYSTOLIC BLOOD PRESSURE: 100 MMHG | WEIGHT: 129.63 LBS | HEIGHT: 67 IN | TEMPERATURE: 98 F | DIASTOLIC BLOOD PRESSURE: 58 MMHG | BODY MASS INDEX: 20.35 KG/M2 | OXYGEN SATURATION: 76 % | HEART RATE: 78 BPM

## 2020-02-19 DIAGNOSIS — D63.1 ANEMIA DUE TO STAGE 5 CHRONIC KIDNEY DISEASE TREATED WITH ERYTHROPOIETIN: Primary | ICD-10-CM

## 2020-02-19 DIAGNOSIS — K52.9 CHRONIC DIARRHEA OF UNKNOWN ORIGIN: ICD-10-CM

## 2020-02-19 DIAGNOSIS — D63.1 ANEMIA DUE TO STAGE 5 CHRONIC KIDNEY DISEASE TREATED WITH ERYTHROPOIETIN: ICD-10-CM

## 2020-02-19 DIAGNOSIS — N18.6 END-STAGE RENAL DISEASE: ICD-10-CM

## 2020-02-19 DIAGNOSIS — R63.0 ANOREXIA: Primary | ICD-10-CM

## 2020-02-19 DIAGNOSIS — Z93.2 ILEOSTOMY IN PLACE: ICD-10-CM

## 2020-02-19 DIAGNOSIS — E11.40 CONTROLLED TYPE 2 DIABETES MELLITUS WITH NEUROPATHY: ICD-10-CM

## 2020-02-19 DIAGNOSIS — N18.5 ANEMIA DUE TO STAGE 5 CHRONIC KIDNEY DISEASE TREATED WITH ERYTHROPOIETIN: Primary | ICD-10-CM

## 2020-02-19 DIAGNOSIS — N18.5 ANEMIA DUE TO STAGE 5 CHRONIC KIDNEY DISEASE TREATED WITH ERYTHROPOIETIN: ICD-10-CM

## 2020-02-19 DIAGNOSIS — M79.602 ARM PAIN, DIFFUSE, LEFT: ICD-10-CM

## 2020-02-19 PROCEDURE — 99214 OFFICE O/P EST MOD 30 MIN: CPT | Mod: S$GLB,,, | Performed by: INTERNAL MEDICINE

## 2020-02-19 PROCEDURE — 1101F PR PT FALLS ASSESS DOC 0-1 FALLS W/OUT INJ PAST YR: ICD-10-PCS | Mod: S$GLB,,, | Performed by: INTERNAL MEDICINE

## 2020-02-19 PROCEDURE — 1101F PT FALLS ASSESS-DOCD LE1/YR: CPT | Mod: S$GLB,,, | Performed by: INTERNAL MEDICINE

## 2020-02-19 PROCEDURE — 1159F MED LIST DOCD IN RCRD: CPT | Mod: S$GLB,,, | Performed by: INTERNAL MEDICINE

## 2020-02-19 PROCEDURE — 96372 THER/PROPH/DIAG INJ SC/IM: CPT

## 2020-02-19 PROCEDURE — 63600175 PHARM REV CODE 636 W HCPCS: Mod: JG,EC | Performed by: INTERNAL MEDICINE

## 2020-02-19 PROCEDURE — 3044F HG A1C LEVEL LT 7.0%: CPT | Mod: S$GLB,,, | Performed by: INTERNAL MEDICINE

## 2020-02-19 PROCEDURE — 1111F DSCHRG MED/CURRENT MED MERGE: CPT | Mod: S$GLB,,, | Performed by: INTERNAL MEDICINE

## 2020-02-19 PROCEDURE — 1111F PR DISCHARGE MEDS RECONCILED W/ CURRENT OUTPATIENT MED LIST: ICD-10-PCS | Mod: S$GLB,,, | Performed by: INTERNAL MEDICINE

## 2020-02-19 PROCEDURE — 1159F PR MEDICATION LIST DOCUMENTED IN MEDICAL RECORD: ICD-10-PCS | Mod: S$GLB,,, | Performed by: INTERNAL MEDICINE

## 2020-02-19 PROCEDURE — 99214 PR OFFICE/OUTPT VISIT, EST, LEVL IV, 30-39 MIN: ICD-10-PCS | Mod: S$GLB,,, | Performed by: INTERNAL MEDICINE

## 2020-02-19 PROCEDURE — 3044F PR MOST RECENT HEMOGLOBIN A1C LEVEL <7.0%: ICD-10-PCS | Mod: S$GLB,,, | Performed by: INTERNAL MEDICINE

## 2020-02-19 PROCEDURE — 1125F PR PAIN SEVERITY QUANTIFIED, PAIN PRESENT: ICD-10-PCS | Mod: S$GLB,,, | Performed by: INTERNAL MEDICINE

## 2020-02-19 PROCEDURE — 1125F AMNT PAIN NOTED PAIN PRSNT: CPT | Mod: S$GLB,,, | Performed by: INTERNAL MEDICINE

## 2020-02-19 RX ORDER — DRONABINOL 2.5 MG/1
2.5 CAPSULE ORAL
Qty: 60 CAPSULE | Refills: 0 | Status: ON HOLD | OUTPATIENT
Start: 2020-02-19 | End: 2020-03-06 | Stop reason: HOSPADM

## 2020-02-19 RX ADMIN — EPOETIN ALFA 20000 UNITS: 20000 SOLUTION INTRAVENOUS; SUBCUTANEOUS at 01:02

## 2020-02-19 NOTE — PROGRESS NOTES
SUBJECTIVE:    Patient ID: Shara Hutchins is a 69 y.o. female.    Chief Complaint: Hospital Follow Up    HPI     HOSPITAL FOLLOW-UP    On 02/10, patient presented to the emergency room with the following symptoms:   Diarrhea       does have blood in her diarrhea that is chronic    Urinary Retention       for 2 days     Nausea    Vomiting      Patient she had had a history of vulvar cancer.  Patient had had perineal and pubic cellulitis since then.  Patient reported increasing pain and swelling to her pubic and perineal area over  the last 10 days.  Pain wa severe in nature.  Patient thought vulvar cancer could have returned.   on physical exam she had very mild diffuse abdominal tenderness without rebound- she had some pubic mass palpable and did have vulvar erythema and swelling with very tender erythematous areas without fluctuance or purulent drainage.   Her potassium was 3.2 GFR 17.6, hematocrit 32.1  She described worsening abdominal pain with diarrhea and vomiting CT scan findings were equivocal in she was felt to have a nonsurgical abdomen it 1st and broad-spectrum antibiotics were initiated for symptoms involve our and perineal cellulitis  Patient underwent diagnostic laparoscopy with creation of ileostomy and lysis of adhesions on 02/13 20/20  Her vulvar cellulitis improved with antibiotics (Diflucan, meropenem, vancomycin )  Discharge 217 diagnosis status post ileostomy, cellulitis, nausea and vomiting, diarrhea, acute renal failure superimposed on stage 5 chronic kidney disease, not on chronic dialysis, and hypothyroidism.      Admit Date: 2/10/20   Discharge Date: 2/17/20  Discharge Facility: [facility]  Medication Reconciliation:  Medications changed/added/deleted. See below  New Prescriptions filled after discharge: yes  Discharge summary reviewed:  yes  Pending test results at discharge reviewed:   no  Follow up appointments scheduled:  yes   with General Surgery/Nephrology/Oncology  Follow  up labs/tests ordered:   no  Home Health ordered on discharge:   yes  Home Health company name: [facility]  DME ordered at discharge:   no  How patient is feeling since discharge from the hospital?  Feeling better post ileostomy due to chronic diarrhea          Medication List at Discharge         calcitriol 0.5 MCG 1 capsule Oral Daily      dicyclomine HCl 10 MG TAKE 1 CAPSULE THREE TIMES A DAY   Patient taking differently:  Take 10 mg by mouth 3 (three) times daily.        diphenhydramine HCl 25 mg Oral Nightly PRN      doxycycline hyclate 100 mg Oral Every 12 hours      hydrocodone/acetaminophen  mg 1 tablet Oral Every 6 hours PRN      levothyroxine sodium 88 MCG TAKE 1 TABLET DAILY   Patient taking differently:  Take 88 mcg by mouth every morning.        magnesium oxide 400 mg Oral 2 times daily       potassium chloride 10 MEQ 20 mEq Oral 2 times daily      promethazine HCl 25 mg Oral Daily PRN       sertraline HCl 25 mg Oral Daily just increased to 50 mg      sodium bicarbonate 650 mg Oral 3 times daily      tramadol HCl 50 mg Oral Every 4 hours PRN       No results displayed because visit has over 200 results.      Lab Visit on 02/03/2020   Component Date Value Ref Range Status    WBC 02/03/2020 8.83  3.90 - 12.70 K/uL Final    RBC 02/03/2020 3.12* 4.00 - 5.40 M/uL Final    Hemoglobin 02/03/2020 10.0* 12.0 - 16.0 g/dL Final    Hematocrit 02/03/2020 31.0* 37.0 - 48.5 % Final    Mean Corpuscular Volume 02/03/2020 99* 82 - 98 fL Final    Mean Corpuscular Hemoglobin 02/03/2020 32.1* 27.0 - 31.0 pg Final    Mean Corpuscular Hemoglobin Conc 02/03/2020 32.3  32.0 - 36.0 g/dL Final    RDW 02/03/2020 16.1* 11.5 - 14.5 % Final    Platelets 02/03/2020 176  150 - 350 K/uL Final    MPV 02/03/2020 11.0  9.2 - 12.9 fL Final    Immature Granulocytes 02/03/2020 0.3  0.0 - 0.5 % Final    Gran # (ANC) 02/03/2020 6.5  1.8 - 7.7 K/uL Final    Immature Grans (Abs) 02/03/2020 0.03  0.00 - 0.04 K/uL  Final    Lymph # 02/03/2020 1.5  1.0 - 4.8 K/uL Final    Mono # 02/03/2020 0.7  0.3 - 1.0 K/uL Final    Eos # 02/03/2020 0.2  0.0 - 0.5 K/uL Final    Baso # 02/03/2020 0.03  0.00 - 0.20 K/uL Final    nRBC 02/03/2020 0  0 /100 WBC Final    Gran% 02/03/2020 73.6* 38.0 - 73.0 % Final    Lymph% 02/03/2020 16.4* 18.0 - 48.0 % Final    Mono% 02/03/2020 7.4  4.0 - 15.0 % Final    Eosinophil% 02/03/2020 2.0  0.0 - 8.0 % Final    Basophil% 02/03/2020 0.3  0.0 - 1.9 % Final    Differential Method 02/03/2020 Automated   Final   Lab Visit on 01/22/2020   Component Date Value Ref Range Status    Hemoglobin A1C 01/22/2020 SEE COMMENT  4.5 - 6.2 % Final    Estimated Avg Glucose 01/22/2020 SEE COMMENT  68 - 131 mg/dL Final    [facility] Miscellaneous Test 01/22/2020 COMMENT   Final    [facility] Test Code: 01/22/2020 [id number]   Final    [facility] Test Name: 01/22/2020 hemoglobin A1C   Final   Lab Visit on 01/10/2020   Component Date Value Ref Range Status    WBC 01/10/2020 6.25  3.90 - 12.70 K/uL Final    RBC 01/10/2020 2.96* 4.00 - 5.40 M/uL Final    Hemoglobin 01/10/2020 9.5* 12.0 - 16.0 g/dL Final    Hematocrit 01/10/2020 30.3* 37.0 - 48.5 % Final    Mean Corpuscular Volume 01/10/2020 102* 82 - 98 fL Final    Mean Corpuscular Hemoglobin 01/10/2020 32.1* 27.0 - 31.0 pg Final    Mean Corpuscular Hemoglobin Conc 01/10/2020 31.4* 32.0 - 36.0 g/dL Final    RDW 01/10/2020 15.6* 11.5 - 14.5 % Final    Platelets 01/10/2020 145* 150 - 350 K/uL Final    MPV 01/10/2020 11.7  9.2 - 12.9 fL Final    Immature Granulocytes 01/10/2020 1.0* 0.0 - 0.5 % Final    Gran # (ANC) 01/10/2020 3.9  1.8 - 7.7 K/uL Final    Immature Grans (Abs) 01/10/2020 0.06* 0.00 - 0.04 K/uL Final    Lymph # 01/10/2020 1.7  1.0 - 4.8 K/uL Final    Mono # 01/10/2020 0.4  0.3 - 1.0 K/uL Final    Eos # 01/10/2020 0.2  0.0 - 0.5 K/uL Final    Baso # 01/10/2020 0.02  0.00 - 0.20 K/uL Final    nRBC 01/10/2020 0  0 /100 WBC Final    Gran% 01/10/2020  62.7  38.0 - 73.0 % Final    Lymph% 01/10/2020 27.4  18.0 - 48.0 % Final    Mono% 01/10/2020 6.2  4.0 - 15.0 % Final    Eosinophil% 01/10/2020 2.4  0.0 - 8.0 % Final    Basophil% 01/10/2020 0.3  0.0 - 1.9 % Final    Differential Method 01/10/2020 Automated   Final    Glucose 01/10/2020 83  70 - 110 mg/dL Final    Sodium 01/10/2020 143  136 - 145 mmol/L Final    Potassium 01/10/2020 3.5  3.5 - 5.1 mmol/L Final    Chloride 01/10/2020 120* 95 - 110 mmol/L Final    CO2 01/10/2020 16* 23 - 29 mmol/L Final    BUN, Bld 01/10/2020 21  8 - 23 mg/dL Final    Calcium 01/10/2020 8.6* 8.7 - 10.5 mg/dL Final    Creatinine 01/10/2020 2.8* 0.5 - 1.4 mg/dL Final    Albumin 01/10/2020 3.7  3.5 - 5.2 g/dL Final    Phosphorus 01/10/2020 3.9  2.7 - 4.5 mg/dL Final    eGFR if African American 01/10/2020 19.1* >60 mL/min/1.73 m^2 Final    eGFR if non African American 01/10/2020 16.6* >60 mL/min/1.73 m^2 Final    Anion Gap 01/10/2020 7* 8 - 16 mmol/L Final    Ferritin 01/10/2020 322* 20.0 - 300.0 ng/mL Final    Iron 01/10/2020 64  30 - 160 ug/dL Final    Transferrin 01/10/2020 125* 200 - 375 mg/dL Final    TIBC 01/10/2020 175* 250 - 450 ug/dL Final    Saturated Iron 01/10/2020 37  20 - 50 % Final   Lab Visit on 01/06/2020   Component Date Value Ref Range Status    WBC 01/06/2020 6.63  3.90 - 12.70 K/uL Final    RBC 01/06/2020 2.69* 4.00 - 5.40 M/uL Final    Hemoglobin 01/06/2020 8.8* 12.0 - 16.0 g/dL Final    Hematocrit 01/06/2020 27.5* 37.0 - 48.5 % Final    Mean Corpuscular Volume 01/06/2020 102* 82 - 98 fL Final    Mean Corpuscular Hemoglobin 01/06/2020 32.7* 27.0 - 31.0 pg Final    Mean Corpuscular Hemoglobin Conc 01/06/2020 32.0  32.0 - 36.0 g/dL Final    RDW 01/06/2020 16.0* 11.5 - 14.5 % Final    Platelets 01/06/2020 138* 150 - 350 K/uL Final    MPV 01/06/2020 11.1  9.2 - 12.9 fL Final    Immature Granulocytes 01/06/2020 0.3  0.0 - 0.5 % Final    Gran # (ANC) 01/06/2020 4.5  1.8 - 7.7 K/uL Final     Immature Grans (Abs) 01/06/2020 0.02  0.00 - 0.04 K/uL Final    Lymph # 01/06/2020 1.5  1.0 - 4.8 K/uL Final    Mono # 01/06/2020 0.4  0.3 - 1.0 K/uL Final    Eos # 01/06/2020 0.2  0.0 - 0.5 K/uL Final    Baso # 01/06/2020 0.03  0.00 - 0.20 K/uL Final    nRBC 01/06/2020 0  0 /100 WBC Final    Gran% 01/06/2020 68.0  38.0 - 73.0 % Final    Lymph% 01/06/2020 22.2  18.0 - 48.0 % Final    Mono% 01/06/2020 6.6  4.0 - 15.0 % Final    Eosinophil% 01/06/2020 2.4  0.0 - 8.0 % Final    Basophil% 01/06/2020 0.5  0.0 - 1.9 % Final    Differential Method 01/06/2020 Automated   Final   Lab Visit on 12/24/2019   Component Date Value Ref Range Status    WBC 12/24/2019 6.50  3.90 - 12.70 K/uL Final    RBC 12/24/2019 2.85* 4.00 - 5.40 M/uL Final    Hemoglobin 12/24/2019 9.2* 12.0 - 16.0 g/dL Final    Hematocrit 12/24/2019 29.3* 37.0 - 48.5 % Final    Mean Corpuscular Volume 12/24/2019 103* 82 - 98 fL Final    Mean Corpuscular Hemoglobin 12/24/2019 32.3* 27.0 - 31.0 pg Final    Mean Corpuscular Hemoglobin Conc 12/24/2019 31.4* 32.0 - 36.0 g/dL Final    RDW 12/24/2019 16.0* 11.5 - 14.5 % Final    Platelets 12/24/2019 147* 150 - 350 K/uL Final    MPV 12/24/2019 12.5  9.2 - 12.9 fL Final    Immature Granulocytes 12/24/2019 0.2  0.0 - 0.5 % Final    Gran # (ANC) 12/24/2019 4.3  1.8 - 7.7 K/uL Final    Immature Grans (Abs) 12/24/2019 0.01  0.00 - 0.04 K/uL Final    Lymph # 12/24/2019 1.5  1.0 - 4.8 K/uL Final    Mono # 12/24/2019 0.5  0.3 - 1.0 K/uL Final    Eos # 12/24/2019 0.2  0.0 - 0.5 K/uL Final    Baso # 12/24/2019 0.01  0.00 - 0.20 K/uL Final    nRBC 12/24/2019 0  0 /100 WBC Final    Gran% 12/24/2019 66.7  38.0 - 73.0 % Final    Lymph% 12/24/2019 22.3  18.0 - 48.0 % Final    Mono% 12/24/2019 7.8  4.0 - 15.0 % Final    Eosinophil% 12/24/2019 2.8  0.0 - 8.0 % Final    Basophil% 12/24/2019 0.2  0.0 - 1.9 % Final    Differential Method 12/24/2019 Automated   Final   Lab Visit on 12/09/2019    Component Date Value Ref Range Status    WBC 12/09/2019 6.78  3.90 - 12.70 K/uL Final    RBC 12/09/2019 2.82* 4.00 - 5.40 M/uL Final    Hemoglobin 12/09/2019 9.2* 12.0 - 16.0 g/dL Final    Hematocrit 12/09/2019 28.4* 37.0 - 48.5 % Final    Mean Corpuscular Volume 12/09/2019 101* 82 - 98 fL Final    Mean Corpuscular Hemoglobin 12/09/2019 32.6* 27.0 - 31.0 pg Final    Mean Corpuscular Hemoglobin Conc 12/09/2019 32.4  32.0 - 36.0 g/dL Final    RDW 12/09/2019 16.1* 11.5 - 14.5 % Final    Platelets 12/09/2019 157  150 - 350 K/uL Final    MPV 12/09/2019 11.0  9.2 - 12.9 fL Final    Immature Granulocytes 12/09/2019 0.4  0.0 - 0.5 % Final    Gran # (ANC) 12/09/2019 4.6  1.8 - 7.7 K/uL Final    Immature Grans (Abs) 12/09/2019 0.03  0.00 - 0.04 K/uL Final    Lymph # 12/09/2019 1.5  1.0 - 4.8 K/uL Final    Mono # 12/09/2019 0.4  0.3 - 1.0 K/uL Final    Eos # 12/09/2019 0.2  0.0 - 0.5 K/uL Final    Baso # 12/09/2019 0.03  0.00 - 0.20 K/uL Final    nRBC 12/09/2019 0  0 /100 WBC Final    Gran% 12/09/2019 67.7  38.0 - 73.0 % Final    Lymph% 12/09/2019 22.6  18.0 - 48.0 % Final    Mono% 12/09/2019 6.5  4.0 - 15.0 % Final    Eosinophil% 12/09/2019 2.4  0.0 - 8.0 % Final    Basophil% 12/09/2019 0.4  0.0 - 1.9 % Final    Differential Method 12/09/2019 Automated   Final   Lab Visit on 11/15/2019   Component Date Value Ref Range Status    Hemoglobin A1C 11/15/2019 Invalid* 4.5 - 6.2 % Corrected    Estimated Avg Glucose 11/15/2019 SEE COMMENT  68 - 131 mg/dL Final    WBC 11/15/2019 6.14  3.90 - 12.70 K/uL Final    RBC 11/15/2019 3.02* 4.00 - 5.40 M/uL Final    Hemoglobin 11/15/2019 9.9* 12.0 - 16.0 g/dL Final    Hematocrit 11/15/2019 30.8* 37.0 - 48.5 % Final    Mean Corpuscular Volume 11/15/2019 102* 82 - 98 fL Final    Mean Corpuscular Hemoglobin 11/15/2019 32.8* 27.0 - 31.0 pg Final    Mean Corpuscular Hemoglobin Conc 11/15/2019 32.1  32.0 - 36.0 g/dL Final    RDW 11/15/2019 14.7* 11.5 - 14.5 %  Final    Platelets 11/15/2019 180  150 - 350 K/uL Final    MPV 11/15/2019 11.2  9.2 - 12.9 fL Final    Immature Granulocytes 11/15/2019 0.2  0.0 - 0.5 % Final    Gran # (ANC) 11/15/2019 3.3  1.8 - 7.7 K/uL Final    Immature Grans (Abs) 11/15/2019 0.01  0.00 - 0.04 K/uL Final    Lymph # 11/15/2019 2.1  1.0 - 4.8 K/uL Final    Mono # 11/15/2019 0.4  0.3 - 1.0 K/uL Final    Eos # 11/15/2019 0.3  0.0 - 0.5 K/uL Final    Baso # 11/15/2019 0.03  0.00 - 0.20 K/uL Final    nRBC 11/15/2019 0  0 /100 WBC Final    Gran% 11/15/2019 53.8  38.0 - 73.0 % Final    Lymph% 11/15/2019 34.5  18.0 - 48.0 % Final    Mono% 11/15/2019 6.8  4.0 - 15.0 % Final    Eosinophil% 11/15/2019 4.2  0.0 - 8.0 % Final    Basophil% 11/15/2019 0.5  0.0 - 1.9 % Final    Differential Method 11/15/2019 Automated   Final    Sodium 11/15/2019 142  136 - 145 mmol/L Final    Potassium 11/15/2019 4.4  3.5 - 5.1 mmol/L Final    Chloride 11/15/2019 114* 95 - 110 mmol/L Final    CO2 11/15/2019 20* 23 - 29 mmol/L Final    Glucose 11/15/2019 81  70 - 110 mg/dL Final    BUN, Bld 11/15/2019 27* 8 - 23 mg/dL Final    Creatinine 11/15/2019 3.0* 0.5 - 1.4 mg/dL Final    Calcium 11/15/2019 8.8  8.7 - 10.5 mg/dL Final    Total Protein 11/15/2019 6.0  6.0 - 8.4 g/dL Final    Albumin 11/15/2019 3.9  3.5 - 5.2 g/dL Final    Total Bilirubin 11/15/2019 0.8  0.1 - 1.0 mg/dL Final    Alkaline Phosphatase 11/15/2019 126  55 - 135 U/L Final    AST 11/15/2019 30  10 - 40 U/L Final    ALT 11/15/2019 29  10 - 44 U/L Final    Anion Gap 11/15/2019 8  8 - 16 mmol/L Final    eGFR if  11/15/2019 17.6* >60 mL/min/1.73 m^2 Final    eGFR if non African American 11/15/2019 15.3* >60 mL/min/1.73 m^2 Final    Phosphorus 11/15/2019 4.7* 2.7 - 4.5 mg/dL Final    PTH, Intact 11/15/2019 238.4* 9.0 - 77.0 pg/mL Final    [facility] Miscellaneous Test 11/15/2019 COMMENT   Final    [facility] Test Code: 11/15/2019 [id number]   Final    [facility] Test Name:  11/15/2019 Hemoglobin A1C   Final   Lab Visit on 11/11/2019   Component Date Value Ref Range Status    WBC 11/11/2019 6.57  3.90 - 12.70 K/uL Final    RBC 11/11/2019 2.99* 4.00 - 5.40 M/uL Final    Hemoglobin 11/11/2019 9.8* 12.0 - 16.0 g/dL Final    Hematocrit 11/11/2019 30.8* 37.0 - 48.5 % Final    Mean Corpuscular Volume 11/11/2019 103* 82 - 98 fL Final    Mean Corpuscular Hemoglobin 11/11/2019 32.8* 27.0 - 31.0 pg Final    Mean Corpuscular Hemoglobin Conc 11/11/2019 31.8* 32.0 - 36.0 g/dL Final    RDW 11/11/2019 15.1* 11.5 - 14.5 % Final    Platelets 11/11/2019 155  150 - 350 K/uL Final    MPV 11/11/2019 11.1  9.2 - 12.9 fL Final    Immature Granulocytes 11/11/2019 0.3  0.0 - 0.5 % Final    Gran # (ANC) 11/11/2019 4.1  1.8 - 7.7 K/uL Final    Immature Grans (Abs) 11/11/2019 0.02  0.00 - 0.04 K/uL Final    Lymph # 11/11/2019 1.8  1.0 - 4.8 K/uL Final    Mono # 11/11/2019 0.5  0.3 - 1.0 K/uL Final    Eos # 11/11/2019 0.3  0.0 - 0.5 K/uL Final    Baso # 11/11/2019 0.03  0.00 - 0.20 K/uL Final    nRBC 11/11/2019 0  0 /100 WBC Final    Gran% 11/11/2019 61.7  38.0 - 73.0 % Final    Lymph% 11/11/2019 26.6  18.0 - 48.0 % Final    Mono% 11/11/2019 6.8  4.0 - 15.0 % Final    Eosinophil% 11/11/2019 4.1  0.0 - 8.0 % Final    Basophil% 11/11/2019 0.5  0.0 - 1.9 % Final    Differential Method 11/11/2019 Automated   Final   Lab Visit on 10/28/2019   Component Date Value Ref Range Status    WBC 10/28/2019 4.95  3.90 - 12.70 K/uL Final    RBC 10/28/2019 2.76* 4.00 - 5.40 M/uL Final    Hemoglobin 10/28/2019 8.9* 12.0 - 16.0 g/dL Final    Hematocrit 10/28/2019 28.1* 37.0 - 48.5 % Final    Mean Corpuscular Volume 10/28/2019 102* 82 - 98 fL Final    Mean Corpuscular Hemoglobin 10/28/2019 32.2* 27.0 - 31.0 pg Final    Mean Corpuscular Hemoglobin Conc 10/28/2019 31.7* 32.0 - 36.0 g/dL Final    RDW 10/28/2019 15.8* 11.5 - 14.5 % Final    Platelets 10/28/2019 133* 150 - 350 K/uL Final    MPV 10/28/2019  10.5  9.2 - 12.9 fL Final    Immature Granulocytes 10/28/2019 0.2  0.0 - 0.5 % Final    Gran # (ANC) 10/28/2019 3.3  1.8 - 7.7 K/uL Final    Immature Grans (Abs) 10/28/2019 0.01  0.00 - 0.04 K/uL Final    Lymph # 10/28/2019 1.0  1.0 - 4.8 K/uL Final    Mono # 10/28/2019 0.4  0.3 - 1.0 K/uL Final    Eos # 10/28/2019 0.2  0.0 - 0.5 K/uL Final    Baso # 10/28/2019 0.02  0.00 - 0.20 K/uL Final    nRBC 10/28/2019 0  0 /100 WBC Final    Gran% 10/28/2019 66.1  38.0 - 73.0 % Final    Lymph% 10/28/2019 20.8  18.0 - 48.0 % Final    Mono% 10/28/2019 7.9  4.0 - 15.0 % Final    Eosinophil% 10/28/2019 4.6  0.0 - 8.0 % Final    Basophil% 10/28/2019 0.4  0.0 - 1.9 % Final    Differential Method 10/28/2019 Automated   Final   There may be more visits with results that are not included.       Past Medical History:   Diagnosis Date    Chronic kidney disease, stage III (moderate) 1/7/2018    Diabetes mellitus, type 2     Fatigue     Hypertension     Iron deficiency anemia due to chronic blood loss 1/7/2018    Vulvar cancer 1/7/2019     Past Surgical History:   Procedure Laterality Date    ABDOMINAL SURGERY      BACK SURGERY      CERVICAL FUSION      x 4    cervix repair      CHOLECYSTECTOMY  2000    DIAGNOSTIC LAPAROSCOPY N/A 2/13/2020    Procedure: LAPAROSCOPY, DIAGNOSTIC;  Surgeon: Robbi Lovell III, MD;  Location: [facility] OR;  Service: General;  Laterality: N/A;    HYSTERECTOMY  1980    ILEOSTOMY N/A 2/13/2020    Procedure: CREATION, ILEOSTOMY Loop;  Surgeon: Robbi Lovell III, MD;  Location: [facility] OR;  Service: General;  Laterality: N/A;    LUMBAR LAMINECTOMY      LYSIS OF ADHESIONS N/A 2/13/2020    Procedure: LYSIS, ADHESIONS;  Surgeon: Robbi Lovell III, MD;  Location: [facility] OR;  Service: General;  Laterality: N/A;    NECK SURGERY      REMOVAL OF VASCULAR ACCESS PORT Right 2/13/2020    Procedure: REMOVAL, VASCULAR ACCESS PORT;  Surgeon: Robbi Lovell III, MD;  Location: [facility] OR;   Service: General;  Laterality: Right;    SHOULDER SURGERY      vulva cancer       Family History   Problem Relation Age of Onset    Heart disease Mother     Hypertension Mother     Cancer Mother         lung    Heart disease Father     Heart disease Sister         Open heart surgery    No Known Problems Daughter        Marital Status:   Alcohol History:  reports that she does not drink alcohol.  Tobacco History:  reports that she quit smoking about 19 years ago. Her smoking use included cigarettes. She has a 33.00 pack-year smoking history. She has never used smokeless tobacco.  Drug History:  reports that she does not use drugs.    Review of patient's allergies indicates:   Allergen Reactions    Ciprofloxacin Other (See Comments)     Elevated liver enzymes      Pcn [penicillins] Anaphylaxis       Current Outpatient Medications:     calcitRIOL (ROCALTROL) 0.5 MCG Cap, Take 1 capsule by mouth once daily., Disp: , Rfl: 3    dicyclomine (BENTYL) 10 MG capsule, TAKE 1 CAPSULE THREE TIMES A DAY (Patient taking differently: Take 10 mg by mouth 3 (three) times daily. ), Disp: 270 capsule, Rfl: 4    diphenhydrAMINE (BENADRYL) 25 mg capsule, Take 25 mg by mouth nightly as needed for Itching., Disp: , Rfl:     doxycycline (VIBRAMYCIN) 100 MG Cap, Take 1 capsule (100 mg total) by mouth every 12 (twelve) hours. for 3 days, Disp: 6 capsule, Rfl: 0    HYDROcodone-acetaminophen (NORCO)  mg per tablet, Take 1 tablet by mouth every 6 (six) hours as needed for Pain., Disp: 30 tablet, Rfl: 0    levothyroxine (SYNTHROID) 88 MCG tablet, TAKE 1 TABLET DAILY (Patient taking differently: Take 88 mcg by mouth every morning. ), Disp: 90 tablet, Rfl: 4    magnesium oxide (MAG-OX) 400 mg (241.3 mg magnesium) tablet, Take 400 mg by mouth 2 (two) times daily., Disp: , Rfl:     potassium chloride (KLOR-CON) 10 MEQ TbSR, Take 20 mEq by mouth 2 (two) times daily. , Disp: , Rfl:     promethazine (PHENERGAN) 25 MG  tablet, Take 1 tablet (25 mg total) by mouth daily as needed for Nausea., Disp: 90 tablet, Rfl: 0    sertraline (ZOLOFT) 50 MG tablet, Take 50 mg by mouth once daily. , Disp: , Rfl:     sodium bicarbonate 650 MG tablet, Take 650 mg by mouth 3 (three) times daily. , Disp: , Rfl: 2    dronabinol (MARINOL) 2.5 MG capsule, Take 1 capsule (2.5 mg total) by mouth 2 (two) times daily before meals., Disp: 60 capsule, Rfl: 0  No current facility-administered medications for this visit.     Review of Systems   Constitutional: Positive for appetite change (decrease in appetite). Negative for chills, diaphoresis, fatigue, fever and unexpected weight change.   HENT: Negative for congestion, ear pain, hearing loss, nosebleeds, postnasal drip, sinus pressure, sinus pain, sneezing, sore throat, tinnitus, trouble swallowing and voice change.    Eyes: Negative for photophobia, pain, itching and visual disturbance.   Respiratory: Negative for apnea, cough, chest tightness, shortness of breath, wheezing and stridor.    Cardiovascular: Negative for chest pain, palpitations and leg swelling.   Gastrointestinal: Negative for abdominal distention, blood in stool, constipation, diarrhea, nausea and vomiting.        Vertical incision in the abdomen with tenderness at the superior end of the wound   Endocrine: Negative for cold intolerance, heat intolerance, polydipsia and polyuria.   Genitourinary: Negative for difficulty urinating, dyspareunia, dysuria, flank pain, frequency, hematuria, menstrual problem, pelvic pain, urgency, vaginal discharge and vaginal pain.   Musculoskeletal: Negative for arthralgias, back pain, joint swelling, myalgias, neck pain and neck stiffness.        Swelling of the left arm with edema and some warmth without tenderness in the arm to the hand   Skin: Negative for pallor.   Allergic/Immunologic: Negative for environmental allergies and food allergies.   Neurological: Negative for dizziness, tremors, speech  "difficulty, weakness, light-headedness and numbness.   Hematological: Bruises/bleeds easily.   Psychiatric/Behavioral: Negative for agitation, confusion, decreased concentration, sleep disturbance and suicidal ideas. The patient is not nervous/anxious.         Objective:      Vitals:    02/19/20 1040   BP: (!) 100/58   Pulse: 78   Resp: 16   Temp: 98.1 °F (36.7 °C)   SpO2: (!) 76%   Weight: 58.8 kg (129 lb 9.6 oz)   Height: 5' 7" (1.702 m)     Physical Exam   Constitutional: She is oriented to person, place, and time. She is cooperative. No distress.   Emaciated female-appears very weak-very poor color   HENT:   Head: Normocephalic and atraumatic.   Nose: Nose normal.   Mouth/Throat: Uvula is midline and mucous membranes are normal.   Eyes: Pupils are equal, round, and reactive to light. Conjunctivae and EOM are normal. Right eye exhibits no discharge. Left eye exhibits no discharge. No scleral icterus. Right pupil is round and reactive. Left pupil is round and reactive.   No scleral icterus   Neck: Trachea normal and normal range of motion. Neck supple. No JVD present. Carotid bruit is not present. No thyromegaly present.   See illustration   Cardiovascular: Normal rate and regular rhythm. Exam reveals no gallop and no friction rub.   No murmur heard.  Pulmonary/Chest: Effort normal and breath sounds normal. No respiratory distress. She has no wheezes. She has no rales.   Decreased peripheral pulses   Abdominal: Soft. Bowel sounds are normal. She exhibits no distension and no mass. There is no tenderness. There is no guarding. No hernia.   Ileostomy bag right hypochondrium   Musculoskeletal: Normal range of motion. She exhibits no edema.   Neurological: She is alert and oriented to person, place, and time. She has normal strength.   Skin: Skin is warm and dry. Capillary refill takes less than 2 seconds. No lesion and no rash noted. No cyanosis. There is pallor. Nails show no clubbing.        Sallow complexion "   Psychiatric: She has a normal mood and affect. Her speech is normal and behavior is normal. Judgment and thought content normal.   Nursing note and vitals reviewed.      Assessment:       1. Anorexia    2. Ileostomy in place    3. Anemia due to stage 5 chronic kidney disease treated with erythropoietin    4. End-stage renal disease    5. Arm pain, diffuse, left    6. Chronic diarrhea of unknown origin    7. 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  Dispense: 60 capsule; Refill: 0      No follow-ups on file.

## 2020-02-23 ENCOUNTER — ANESTHESIA (OUTPATIENT)
Dept: INTENSIVE CARE | Facility: HOSPITAL | Age: 70
DRG: 682 | End: 2020-02-23
Payer: MEDICARE

## 2020-02-23 ENCOUNTER — ANESTHESIA EVENT (OUTPATIENT)
Dept: INTENSIVE CARE | Facility: HOSPITAL | Age: 70
DRG: 682 | End: 2020-02-23
Payer: MEDICARE

## 2020-02-23 ENCOUNTER — HOSPITAL ENCOUNTER (INPATIENT)
Facility: HOSPITAL | Age: 70
LOS: 9 days | Discharge: ANOTHER HOSPITAL | DRG: 682 | End: 2020-03-05
Attending: EMERGENCY MEDICINE | Admitting: INTERNAL MEDICINE
Payer: MEDICARE

## 2020-02-23 DIAGNOSIS — E43 SEVERE MALNUTRITION: ICD-10-CM

## 2020-02-23 DIAGNOSIS — R07.9 CHEST PAIN: ICD-10-CM

## 2020-02-23 DIAGNOSIS — E86.0 SEVERE DEHYDRATION: ICD-10-CM

## 2020-02-23 DIAGNOSIS — I87.2 VENOUS INSUFFICIENCY: ICD-10-CM

## 2020-02-23 DIAGNOSIS — N18.5 STAGE 5 CHRONIC KIDNEY DISEASE NOT ON CHRONIC DIALYSIS: Chronic | ICD-10-CM

## 2020-02-23 DIAGNOSIS — N17.9 AKI (ACUTE KIDNEY INJURY): Primary | ICD-10-CM

## 2020-02-23 DIAGNOSIS — E87.8 ELECTROLYTE IMBALANCE: ICD-10-CM

## 2020-02-23 DIAGNOSIS — E86.0 DEHYDRATION: ICD-10-CM

## 2020-02-23 LAB
ABO + RH BLD: NORMAL
ALBUMIN SERPL BCP-MCNC: 3.7 G/DL (ref 3.5–5.2)
ALP SERPL-CCNC: 231 U/L (ref 55–135)
ALT SERPL W/O P-5'-P-CCNC: 25 U/L (ref 10–44)
ANION GAP SERPL CALC-SCNC: 25 MMOL/L (ref 8–16)
AST SERPL-CCNC: 35 U/L (ref 10–40)
BACTERIA #/AREA URNS HPF: NEGATIVE /HPF
BASOPHILS # BLD AUTO: 0.02 K/UL (ref 0–0.2)
BASOPHILS NFR BLD: 0.3 % (ref 0–1.9)
BILIRUB SERPL-MCNC: 1.9 MG/DL (ref 0.1–1)
BILIRUB UR QL STRIP: NEGATIVE
BLD GP AB SCN CELLS X3 SERPL QL: NORMAL
BUN SERPL-MCNC: 36 MG/DL (ref 8–23)
C DIFF GDH STL QL: NEGATIVE
C DIFF TOX A+B STL QL IA: NEGATIVE
CALCIUM SERPL-MCNC: 9 MG/DL (ref 8.7–10.5)
CHLORIDE SERPL-SCNC: 79 MMOL/L (ref 95–110)
CLARITY UR: CLEAR
CO2 SERPL-SCNC: 40 MMOL/L (ref 23–29)
COLOR UR: YELLOW
CREAT SERPL-MCNC: 3.9 MG/DL (ref 0.5–1.4)
DIFFERENTIAL METHOD: ABNORMAL
EOSINOPHIL # BLD AUTO: 0 K/UL (ref 0–0.5)
EOSINOPHIL NFR BLD: 0.3 % (ref 0–8)
ERYTHROCYTE [DISTWIDTH] IN BLOOD BY AUTOMATED COUNT: 14.9 % (ref 11.5–14.5)
EST. GFR  (AFRICAN AMERICAN): 12.8 ML/MIN/1.73 M^2
EST. GFR  (NON AFRICAN AMERICAN): 11.1 ML/MIN/1.73 M^2
GLUCOSE SERPL-MCNC: 102 MG/DL (ref 70–110)
GLUCOSE UR QL STRIP: NEGATIVE
HCT VFR BLD AUTO: 33.8 % (ref 37–48.5)
HGB BLD-MCNC: 10.2 G/DL (ref 12–16)
HGB UR QL STRIP: NEGATIVE
HYALINE CASTS #/AREA URNS LPF: 19 /LPF
IMM GRANULOCYTES # BLD AUTO: 0.03 K/UL (ref 0–0.04)
IMM GRANULOCYTES NFR BLD AUTO: 0.5 % (ref 0–0.5)
INR PPP: 1.4
KETONES UR QL STRIP: NEGATIVE
LACTATE SERPL-SCNC: 1.9 MMOL/L (ref 0.5–1.9)
LACTATE SERPL-SCNC: 4.4 MMOL/L (ref 0.5–1.9)
LEUKOCYTE ESTERASE UR QL STRIP: ABNORMAL
LIPASE SERPL-CCNC: 41 U/L (ref 4–60)
LYMPHOCYTES # BLD AUTO: 0.8 K/UL (ref 1–4.8)
LYMPHOCYTES NFR BLD: 13.1 % (ref 18–48)
MAGNESIUM SERPL-MCNC: 1.5 MG/DL (ref 1.6–2.6)
MCH RBC QN AUTO: 31.4 PG (ref 27–31)
MCHC RBC AUTO-ENTMCNC: 30.2 G/DL (ref 32–36)
MCV RBC AUTO: 104 FL (ref 82–98)
MICROSCOPIC COMMENT: ABNORMAL
MONOCYTES # BLD AUTO: 0.4 K/UL (ref 0.3–1)
MONOCYTES NFR BLD: 6.2 % (ref 4–15)
NEUTROPHILS # BLD AUTO: 5 K/UL (ref 1.8–7.7)
NEUTROPHILS NFR BLD: 79.6 % (ref 38–73)
NITRITE UR QL STRIP: NEGATIVE
NRBC BLD-RTO: 0 /100 WBC
PH UR STRIP: >8 [PH] (ref 5–8)
PLATELET # BLD AUTO: 285 K/UL (ref 150–350)
PMV BLD AUTO: 10.6 FL (ref 9.2–12.9)
POTASSIUM SERPL-SCNC: 3.9 MMOL/L (ref 3.5–5.1)
PROT SERPL-MCNC: 6.1 G/DL (ref 6–8.4)
PROT UR QL STRIP: ABNORMAL
PROTHROMBIN TIME: 16.6 SEC (ref 10.6–14.8)
RBC # BLD AUTO: 3.25 M/UL (ref 4–5.4)
RBC #/AREA URNS HPF: 4 /HPF (ref 0–4)
SODIUM SERPL-SCNC: 144 MMOL/L (ref 136–145)
SP GR UR STRIP: 1.01 (ref 1–1.03)
SQUAMOUS #/AREA URNS HPF: 6 /HPF
T4 FREE SERPL-MCNC: 1.16 NG/DL (ref 0.71–1.51)
TSH SERPL DL<=0.005 MIU/L-ACNC: 7.18 UIU/ML (ref 0.34–5.6)
URN SPEC COLLECT METH UR: ABNORMAL
UROBILINOGEN UR STRIP-ACNC: NEGATIVE EU/DL
WBC # BLD AUTO: 6.26 K/UL (ref 3.9–12.7)
WBC #/AREA URNS HPF: 19 /HPF (ref 0–5)

## 2020-02-23 PROCEDURE — 25000003 PHARM REV CODE 250: Performed by: INTERNAL MEDICINE

## 2020-02-23 PROCEDURE — 85025 COMPLETE CBC W/AUTO DIFF WBC: CPT

## 2020-02-23 PROCEDURE — 96375 TX/PRO/DX INJ NEW DRUG ADDON: CPT

## 2020-02-23 PROCEDURE — 83690 ASSAY OF LIPASE: CPT

## 2020-02-23 PROCEDURE — 99900035 HC TECH TIME PER 15 MIN (STAT)

## 2020-02-23 PROCEDURE — 96360 HYDRATION IV INFUSION INIT: CPT

## 2020-02-23 PROCEDURE — 96361 HYDRATE IV INFUSION ADD-ON: CPT

## 2020-02-23 PROCEDURE — G0378 HOSPITAL OBSERVATION PER HR: HCPCS

## 2020-02-23 PROCEDURE — 87449 NOS EACH ORGANISM AG IA: CPT

## 2020-02-23 PROCEDURE — 83605 ASSAY OF LACTIC ACID: CPT

## 2020-02-23 PROCEDURE — 96374 THER/PROPH/DIAG INJ IV PUSH: CPT | Mod: 59

## 2020-02-23 PROCEDURE — 84443 ASSAY THYROID STIM HORMONE: CPT

## 2020-02-23 PROCEDURE — 63600175 PHARM REV CODE 636 W HCPCS: Performed by: EMERGENCY MEDICINE

## 2020-02-23 PROCEDURE — 83605 ASSAY OF LACTIC ACID: CPT | Mod: 91

## 2020-02-23 PROCEDURE — 84439 ASSAY OF FREE THYROXINE: CPT

## 2020-02-23 PROCEDURE — 81001 URINALYSIS AUTO W/SCOPE: CPT

## 2020-02-23 PROCEDURE — 87040 BLOOD CULTURE FOR BACTERIA: CPT | Mod: 59

## 2020-02-23 PROCEDURE — 63600175 PHARM REV CODE 636 W HCPCS: Performed by: INTERNAL MEDICINE

## 2020-02-23 PROCEDURE — 86850 RBC ANTIBODY SCREEN: CPT

## 2020-02-23 PROCEDURE — 94761 N-INVAS EAR/PLS OXIMETRY MLT: CPT

## 2020-02-23 PROCEDURE — 87324 CLOSTRIDIUM AG IA: CPT

## 2020-02-23 PROCEDURE — 80053 COMPREHEN METABOLIC PANEL: CPT

## 2020-02-23 PROCEDURE — 83735 ASSAY OF MAGNESIUM: CPT

## 2020-02-23 PROCEDURE — 36415 COLL VENOUS BLD VENIPUNCTURE: CPT

## 2020-02-23 PROCEDURE — 99284 EMERGENCY DEPT VISIT MOD MDM: CPT | Mod: 25

## 2020-02-23 PROCEDURE — 85610 PROTHROMBIN TIME: CPT

## 2020-02-23 PROCEDURE — 87086 URINE CULTURE/COLONY COUNT: CPT

## 2020-02-23 RX ORDER — LOPERAMIDE HYDROCHLORIDE 2 MG/1
4 CAPSULE ORAL 4 TIMES DAILY
Status: DISCONTINUED | OUTPATIENT
Start: 2020-02-23 | End: 2020-03-06 | Stop reason: HOSPADM

## 2020-02-23 RX ORDER — DRONABINOL 2.5 MG/1
2.5 CAPSULE ORAL
Status: DISCONTINUED | OUTPATIENT
Start: 2020-02-24 | End: 2020-03-06 | Stop reason: HOSPADM

## 2020-02-23 RX ORDER — SODIUM CHLORIDE, SODIUM LACTATE, POTASSIUM CHLORIDE, CALCIUM CHLORIDE 600; 310; 30; 20 MG/100ML; MG/100ML; MG/100ML; MG/100ML
INJECTION, SOLUTION INTRAVENOUS CONTINUOUS
Status: DISCONTINUED | OUTPATIENT
Start: 2020-02-23 | End: 2020-03-06 | Stop reason: HOSPADM

## 2020-02-23 RX ORDER — IBUPROFEN 200 MG
24 TABLET ORAL
Status: DISCONTINUED | OUTPATIENT
Start: 2020-02-23 | End: 2020-03-06 | Stop reason: HOSPADM

## 2020-02-23 RX ORDER — ONDANSETRON 4 MG/1
8 TABLET, ORALLY DISINTEGRATING ORAL EVERY 8 HOURS PRN
Status: DISCONTINUED | OUTPATIENT
Start: 2020-02-23 | End: 2020-03-06 | Stop reason: HOSPADM

## 2020-02-23 RX ORDER — IBUPROFEN 200 MG
16 TABLET ORAL
Status: DISCONTINUED | OUTPATIENT
Start: 2020-02-23 | End: 2020-03-06 | Stop reason: HOSPADM

## 2020-02-23 RX ORDER — FENTANYL CITRATE 50 UG/ML
50 INJECTION, SOLUTION INTRAMUSCULAR; INTRAVENOUS
Status: COMPLETED | OUTPATIENT
Start: 2020-02-23 | End: 2020-02-23

## 2020-02-23 RX ORDER — LEVOTHYROXINE SODIUM 88 UG/1
88 TABLET ORAL EVERY MORNING
Status: DISCONTINUED | OUTPATIENT
Start: 2020-02-24 | End: 2020-03-06 | Stop reason: HOSPADM

## 2020-02-23 RX ORDER — GLUCAGON 1 MG
1 KIT INJECTION
Status: DISCONTINUED | OUTPATIENT
Start: 2020-02-23 | End: 2020-03-06 | Stop reason: HOSPADM

## 2020-02-23 RX ORDER — VANCOMYCIN HCL IN 5 % DEXTROSE 1G/250ML
1000 PLASTIC BAG, INJECTION (ML) INTRAVENOUS
Status: DISCONTINUED | OUTPATIENT
Start: 2020-02-23 | End: 2020-02-23

## 2020-02-23 RX ORDER — SODIUM CHLORIDE 0.9 % (FLUSH) 0.9 %
10 SYRINGE (ML) INJECTION EVERY 6 HOURS PRN
Status: DISCONTINUED | OUTPATIENT
Start: 2020-02-23 | End: 2020-03-06 | Stop reason: HOSPADM

## 2020-02-23 RX ORDER — PANTOPRAZOLE SODIUM 40 MG/1
40 TABLET, DELAYED RELEASE ORAL DAILY
Status: DISCONTINUED | OUTPATIENT
Start: 2020-02-24 | End: 2020-02-26

## 2020-02-23 RX ORDER — POTASSIUM CHLORIDE 20 MEQ/1
20 TABLET, EXTENDED RELEASE ORAL 2 TIMES DAILY
Status: DISCONTINUED | OUTPATIENT
Start: 2020-02-23 | End: 2020-02-25

## 2020-02-23 RX ORDER — LANOLIN ALCOHOL/MO/W.PET/CERES
400 CREAM (GRAM) TOPICAL 2 TIMES DAILY
Status: DISCONTINUED | OUTPATIENT
Start: 2020-02-23 | End: 2020-03-06 | Stop reason: HOSPADM

## 2020-02-23 RX ORDER — MAGNESIUM SULFATE 1 G/100ML
1 INJECTION INTRAVENOUS
Status: COMPLETED | OUTPATIENT
Start: 2020-02-23 | End: 2020-02-24

## 2020-02-23 RX ORDER — SERTRALINE HYDROCHLORIDE 50 MG/1
50 TABLET, FILM COATED ORAL DAILY
Status: DISCONTINUED | OUTPATIENT
Start: 2020-02-24 | End: 2020-03-06 | Stop reason: HOSPADM

## 2020-02-23 RX ORDER — ONDANSETRON 2 MG/ML
4 INJECTION INTRAMUSCULAR; INTRAVENOUS
Status: COMPLETED | OUTPATIENT
Start: 2020-02-23 | End: 2020-02-23

## 2020-02-23 RX ORDER — HYDROCODONE BITARTRATE AND ACETAMINOPHEN 5; 325 MG/1; MG/1
1 TABLET ORAL EVERY 6 HOURS PRN
Status: DISCONTINUED | OUTPATIENT
Start: 2020-02-23 | End: 2020-03-06 | Stop reason: HOSPADM

## 2020-02-23 RX ORDER — TALC
9 POWDER (GRAM) TOPICAL NIGHTLY PRN
Status: DISCONTINUED | OUTPATIENT
Start: 2020-02-23 | End: 2020-03-06 | Stop reason: HOSPADM

## 2020-02-23 RX ORDER — ACETAMINOPHEN 325 MG/1
650 TABLET ORAL EVERY 4 HOURS PRN
Status: DISCONTINUED | OUTPATIENT
Start: 2020-02-23 | End: 2020-03-06 | Stop reason: HOSPADM

## 2020-02-23 RX ADMIN — MEROPENEM 1 G: 1 INJECTION, POWDER, FOR SOLUTION INTRAVENOUS at 06:02

## 2020-02-23 RX ADMIN — VANCOMYCIN HYDROCHLORIDE 750 MG: 1 INJECTION, POWDER, LYOPHILIZED, FOR SOLUTION INTRAVENOUS at 10:02

## 2020-02-23 RX ADMIN — SODIUM CHLORIDE, SODIUM LACTATE, POTASSIUM CHLORIDE, AND CALCIUM CHLORIDE 1000 ML: .6; .31; .03; .02 INJECTION, SOLUTION INTRAVENOUS at 03:02

## 2020-02-23 RX ADMIN — LOPERAMIDE HYDROCHLORIDE 4 MG: 2 CAPSULE ORAL at 10:02

## 2020-02-23 RX ADMIN — ONDANSETRON 4 MG: 2 INJECTION INTRAMUSCULAR; INTRAVENOUS at 05:02

## 2020-02-23 RX ADMIN — MAGNESIUM OXIDE 400 MG: 400 TABLET ORAL at 10:02

## 2020-02-23 RX ADMIN — SODIUM CHLORIDE, SODIUM LACTATE, POTASSIUM CHLORIDE, AND CALCIUM CHLORIDE 1000 ML: .6; .31; .03; .02 INJECTION, SOLUTION INTRAVENOUS at 05:02

## 2020-02-23 RX ADMIN — FENTANYL CITRATE 50 MCG: 50 INJECTION, SOLUTION INTRAMUSCULAR; INTRAVENOUS at 05:02

## 2020-02-23 RX ADMIN — POTASSIUM CHLORIDE 20 MEQ: 20 TABLET, EXTENDED RELEASE ORAL at 10:02

## 2020-02-23 RX ADMIN — SODIUM CHLORIDE, POTASSIUM CHLORIDE, SODIUM LACTATE AND CALCIUM CHLORIDE: 600; 310; 30; 20 INJECTION, SOLUTION INTRAVENOUS at 10:02

## 2020-02-23 NOTE — ED PROVIDER NOTES
Encounter Date: 2/23/2020       History     Chief Complaint   Patient presents with    Fatigue     Recent ileosomy, 20lb weight loss this week, vaginal bleeding     69-year-old female with a history of HTN, anemia, vulvar CA (in remission), DM, CKD presents to the ER with abdominal pain, lightheadedness, vaginal bleeding.  Patient had an ileostomy performed on 2/13/2020 (Dr. Meza) for chronic, debilitating diarrhea.  Since then, she has reported a 20 lb weight loss.  Intermittent nausea and vomiting that is nonbloody and nonbilious.  States that anything that she eats goes straight into her ostomy bag.  She has noted seeing whole pills that she has taken.  Denies bloody or black output.  Pain to her abdominal incision site that she states is worsening.  She has also had some lightheadedness when standing.  Today, she noted some blood on her pad that she suspects is from her vulva.  Denies fever, chest pain, shortness of breath, or changes in bladder function.        Review of patient's allergies indicates:   Allergen Reactions    Ciprofloxacin Other (See Comments)     Elevated liver enzymes      Pcn [penicillins] Anaphylaxis     Past Medical History:   Diagnosis Date    Chronic kidney disease, stage III (moderate) 1/7/2018    Diabetes mellitus, type 2     Fatigue     Hypertension     Iron deficiency anemia due to chronic blood loss 1/7/2018    Vulvar cancer 1/7/2019     Past Surgical History:   Procedure Laterality Date    ABDOMINAL SURGERY      BACK SURGERY      CERVICAL FUSION      x 4    cervix repair      CHOLECYSTECTOMY  2000    DIAGNOSTIC LAPAROSCOPY N/A 2/13/2020    Procedure: LAPAROSCOPY, DIAGNOSTIC;  Surgeon: Robbi Lovell III, MD;  Location: [facility] OR;  Service: General;  Laterality: N/A;    HYSTERECTOMY  1980    ILEOSTOMY N/A 2/13/2020    Procedure: CREATION, ILEOSTOMY Loop;  Surgeon: Robbi Lovell III, MD;  Location: [facility] OR;  Service: General;  Laterality: N/A;    LUMBAR  LAMINECTOMY      LYSIS OF ADHESIONS N/A 2020    Procedure: LYSIS, ADHESIONS;  Surgeon: Robbi Lovell III, MD;  Location: [facility] OR;  Service: General;  Laterality: N/A;    NECK SURGERY      REMOVAL OF VASCULAR ACCESS PORT Right 2020    Procedure: REMOVAL, VASCULAR ACCESS PORT;  Surgeon: Robbi Lovell III, MD;  Location: [facility] OR;  Service: General;  Laterality: Right;    SHOULDER SURGERY      vulva cancer       Family History   Problem Relation Age of Onset    Heart disease Mother     Hypertension Mother     Cancer Mother         lung    Heart disease Father     Heart disease Sister         Open heart surgery    No Known Problems Daughter      Social History     Tobacco Use    Smoking status: Former Smoker     Packs/day: 1.00     Years: 33.00     Pack years: 33.00     Types: Cigarettes     Last attempt to quit: 2000     Years since quittin.6    Smokeless tobacco: Never Used   Substance Use Topics    Alcohol use: No    Drug use: No     Review of Systems   Constitutional: Negative for fever.   HENT: Negative for sore throat.    Respiratory: Negative for shortness of breath.    Cardiovascular: Negative for chest pain.   Gastrointestinal: Positive for abdominal pain, nausea and vomiting. Negative for blood in stool.   Genitourinary: Positive for vaginal bleeding. Negative for dysuria, vaginal discharge and vaginal pain.   Musculoskeletal: Negative for back pain.   Skin: Negative for rash.   Neurological: Positive for light-headedness. Negative for weakness.   Hematological: Does not bruise/bleed easily.   All other systems reviewed and are negative.      Physical Exam     Initial Vitals   BP Pulse Resp Temp SpO2   20 1422 20 1413 20 1413 20 1413 20 1413   (!) 95/58 70 20 98 °F (36.7 °C) 100 %      MAP       --                Physical Exam    Constitutional: She appears well-developed and well-nourished. No distress.   HENT:   Head: Normocephalic and  atraumatic.   Mouth/Throat: Oropharynx is clear and moist.   Eyes: Conjunctivae and EOM are normal. Pupils are equal, round, and reactive to light.   Neck: Normal range of motion.   Cardiovascular: Normal rate, regular rhythm, normal heart sounds and intact distal pulses.   No murmur heard.  Pulmonary/Chest: Breath sounds normal. No respiratory distress. She has no wheezes. She has no rhonchi. She has no rales.   Abdominal: Soft. Bowel sounds are normal. She exhibits no distension. There is tenderness. There is no rigidity, no rebound and no guarding.       Genitourinary:         Musculoskeletal: Normal range of motion. She exhibits no edema or tenderness.   Neurological: She is alert.   Skin: Skin is warm and dry.   Psychiatric: She has a normal mood and affect. Thought content normal.         ED Course   Procedures  Labs Reviewed   CBC W/ AUTO DIFFERENTIAL - Abnormal; Notable for the following components:       Result Value    RBC 3.25 (*)     Hemoglobin 10.2 (*)     Hematocrit 33.8 (*)     Mean Corpuscular Volume 104 (*)     Mean Corpuscular Hemoglobin 31.4 (*)     Mean Corpuscular Hemoglobin Conc 30.2 (*)     RDW 14.9 (*)     Lymph # 0.8 (*)     Gran% 79.6 (*)     Lymph% 13.1 (*)     All other components within normal limits   COMPREHENSIVE METABOLIC PANEL - Abnormal; Notable for the following components:    Chloride 79 (*)     CO2 40 (*)     BUN, Bld 36 (*)     Creatinine 3.9 (*)     Total Bilirubin 1.9 (*)     Alkaline Phosphatase 231 (*)     Anion Gap 25 (*)     eGFR if  12.8 (*)     eGFR if non  11.1 (*)     All other components within normal limits   LACTIC ACID, PLASMA - Abnormal; Notable for the following components:    Lactate (Lactic Acid) 4.4 (*)     All other components within normal limits    Narrative:     Lactic Acid critical result(s) called and verbal readback obtained   from CHERRY MARRERO  by KB5 02/23/2020 16:11   PROTIME-INR - Abnormal; Notable for the  following components:    PT 16.6 (*)     All other components within normal limits   TSH - Abnormal; Notable for the following components:    TSH 7.180 (*)     All other components within normal limits   URINALYSIS, REFLEX TO URINE CULTURE - Abnormal; Notable for the following components:    pH, UA >8.0 (*)     Protein, UA 1+ (*)     Leukocytes, UA 1+ (*)     All other components within normal limits    Narrative:     Preferred Collection Type->Urine, Clean Catch  Specimen Source->Urine   MAGNESIUM - Abnormal; Notable for the following components:    Magnesium 1.5 (*)     All other components within normal limits   URINALYSIS MICROSCOPIC - Abnormal; Notable for the following components:    WBC, UA 19 (*)     Hyaline Casts, UA 19 (*)     All other components within normal limits    Narrative:     Preferred Collection Type->Urine, Clean Catch  Specimen Source->Urine   CULTURE, BLOOD   CULTURE, BLOOD   CULTURE, URINE   LIPASE   T4, FREE   TYPE & SCREEN          Imaging Results          CT Abdomen Pelvis  Without Contrast (Final result)  Result time 02/23/20 16:17:40    Final result by Michaela Faye MD (02/23/20 16:17:40)                 Narrative:    CMS MANDATED QUALITY DATA - CT RADIATION 436. CT scans at this  facility utilize dose modulation, iterative reconstruction, and/or  weight based dosing when appropriate to reduce radiation dose to as  low as reasonably achievable.    PROCEDURE:    CT ABDOMEN PELVIS WITHOUT CONTRAST  dated  2/23/2020  4:11 PM    CLINICAL HISTORY:   Female 69 years of age.   Abdominal pain, recent  ileostomy    TECHNIQUE:  CT of the Abdomen and Pelvis was performed. Intravenous  contrast was not administered.    COMPARISON:  CT February 12, 2020    FINDINGS:    There is no pleural effusion or pulmonary infiltrate within the  included lower chest.    The liver is diffusely fatty and not enlarged. There are  cholecystectomy clips. There is pancreatic atrophy. The spleen and  adrenal glands  are normal. There are numerous renal stones within the  calyces and infundibula. There is no hydronephrosis, ureteral stone or  bladder stone.    There has been interval surgery since the recent CT. There are midline  abdominal wall staples, and a right ileostomy. Previous pneumatosis  intestinalis along the right colon is no longer present. Bowel is not  dilated or thickened.    Urinary bladder is mildly filled. Uterus is surgically absent.  Generalized thinning of the subcutaneous fat of the abdominal wall has  decreased compared with the prior exam. Aorta is calcified and normal  in caliber.    There is no destructive osseous lesion or acute fracture.    IMPRESSION:    Interval right ileostomy. Resolution of previously metallic  intestinalis. Resolution of previous fluid distention of bowel.  Postsurgical abdominal wall and small focus of intraperitoneal free  air in the anterior abdomen.    Electronically Signed by Michaela Faye on 2/23/2020 4:48 PM                               Medical Decision Making:   Initial Assessment:   69-year-old female with a history of HTN, anemia, vulvar CA (in remission), DM, CKD presents to the ER with abdominal pain, lightheadedness, vaginal bleeding.  ED Management:  Plan:  Afebrile, BP 90/57, vital signs otherwise stable. Labs, lactate.  Blood cultures.  Pelvic exam pending.  CT AP.  Expecting admission.    Reassessed.  Patient lying in bed in no acute distress. SBP improved to 110s with IV fluids.  Lab showed WBC 6.26, H&H 10.2/33.8 which is significantly higher than previous.  Chloride 79, bicarb 40, BUN 36, creatinine 3.9, significantly up from 2.3.  Anion gap 25.  Lactate 4.4.  Lipase 41.  Free T4 1.16, normal. UA shows RBC 4, WBC 19, bacteria negative. CT AP shows postsurgical abdominal wall with small focus of intraperitoneal free air.  Low suspicion of perforation.  No evidence of obstruction.  Suspect patient is severely dehydrated from her large ostomy output causing  her hemoconcentration, DARCI. Infection is still a strong possibility, so will give IV abx. Admit to the hospitalist.                                 Clinical Impression:       ICD-10-CM ICD-9-CM   1. DARCI (acute kidney injury) N17.9 584.9   2. Dehydration E86.0 276.51                             Jacob Shields MD  02/23/20 [id number]

## 2020-02-24 PROBLEM — E86.0 SEVERE DEHYDRATION: Status: ACTIVE | Noted: 2020-02-24

## 2020-02-24 PROBLEM — E43 SEVERE MALNUTRITION: Status: ACTIVE | Noted: 2020-02-24

## 2020-02-24 PROBLEM — E87.8 ELECTROLYTE IMBALANCE: Status: ACTIVE | Noted: 2020-02-24

## 2020-02-24 PROBLEM — Z66 DNR (DO NOT RESUSCITATE): Status: ACTIVE | Noted: 2020-02-24

## 2020-02-24 LAB
ALBUMIN SERPL BCP-MCNC: 2.9 G/DL (ref 3.5–5.2)
ALP SERPL-CCNC: 168 U/L (ref 55–135)
ALT SERPL W/O P-5'-P-CCNC: 20 U/L (ref 10–44)
ANION GAP SERPL CALC-SCNC: 13 MMOL/L (ref 8–16)
AST SERPL-CCNC: 25 U/L (ref 10–40)
BASOPHILS # BLD AUTO: 0.01 K/UL (ref 0–0.2)
BASOPHILS NFR BLD: 0.1 % (ref 0–1.9)
BILIRUB SERPL-MCNC: 1.6 MG/DL (ref 0.1–1)
BUN SERPL-MCNC: 33 MG/DL (ref 8–23)
CALCIUM SERPL-MCNC: 8.1 MG/DL (ref 8.7–10.5)
CHLORIDE SERPL-SCNC: 85 MMOL/L (ref 95–110)
CO2 SERPL-SCNC: 41 MMOL/L (ref 23–29)
CREAT SERPL-MCNC: 3.8 MG/DL (ref 0.5–1.4)
DIFFERENTIAL METHOD: ABNORMAL
EOSINOPHIL # BLD AUTO: 0 K/UL (ref 0–0.5)
EOSINOPHIL NFR BLD: 0.1 % (ref 0–8)
ERYTHROCYTE [DISTWIDTH] IN BLOOD BY AUTOMATED COUNT: 14.7 % (ref 11.5–14.5)
EST. GFR  (AFRICAN AMERICAN): 13.2 ML/MIN/1.73 M^2
EST. GFR  (NON AFRICAN AMERICAN): 11.5 ML/MIN/1.73 M^2
GLUCOSE SERPL-MCNC: 119 MG/DL (ref 70–110)
GLUCOSE SERPL-MCNC: 62 MG/DL (ref 70–110)
GLUCOSE SERPL-MCNC: 96 MG/DL (ref 70–110)
HCT VFR BLD AUTO: 26.3 % (ref 37–48.5)
HGB BLD-MCNC: 8.2 G/DL (ref 12–16)
IMM GRANULOCYTES # BLD AUTO: 0.05 K/UL (ref 0–0.04)
IMM GRANULOCYTES NFR BLD AUTO: 0.6 % (ref 0–0.5)
LACTATE SERPL-SCNC: 1 MMOL/L (ref 0.5–1.9)
LYMPHOCYTES # BLD AUTO: 1.2 K/UL (ref 1–4.8)
LYMPHOCYTES NFR BLD: 14.1 % (ref 18–48)
MAGNESIUM SERPL-MCNC: 2.7 MG/DL (ref 1.6–2.6)
MCH RBC QN AUTO: 32.4 PG (ref 27–31)
MCHC RBC AUTO-ENTMCNC: 31.2 G/DL (ref 32–36)
MCV RBC AUTO: 104 FL (ref 82–98)
MONOCYTES # BLD AUTO: 0.6 K/UL (ref 0.3–1)
MONOCYTES NFR BLD: 7 % (ref 4–15)
NEUTROPHILS # BLD AUTO: 6.8 K/UL (ref 1.8–7.7)
NEUTROPHILS NFR BLD: 78.1 % (ref 38–73)
NRBC BLD-RTO: 0 /100 WBC
PLATELET # BLD AUTO: 198 K/UL (ref 150–350)
PMV BLD AUTO: 10.8 FL (ref 9.2–12.9)
POTASSIUM SERPL-SCNC: 3.6 MMOL/L (ref 3.5–5.1)
PROT SERPL-MCNC: 4.7 G/DL (ref 6–8.4)
RBC # BLD AUTO: 2.53 M/UL (ref 4–5.4)
SODIUM SERPL-SCNC: 139 MMOL/L (ref 136–145)
VANCOMYCIN SERPL-MCNC: 15.4 UG/ML
WBC # BLD AUTO: 8.67 K/UL (ref 3.9–12.7)

## 2020-02-24 PROCEDURE — 83735 ASSAY OF MAGNESIUM: CPT

## 2020-02-24 PROCEDURE — 83605 ASSAY OF LACTIC ACID: CPT

## 2020-02-24 PROCEDURE — 84480 ASSAY TRIIODOTHYRONINE (T3): CPT

## 2020-02-24 PROCEDURE — 80202 ASSAY OF VANCOMYCIN: CPT

## 2020-02-24 PROCEDURE — 94761 N-INVAS EAR/PLS OXIMETRY MLT: CPT

## 2020-02-24 PROCEDURE — 25000003 PHARM REV CODE 250: Performed by: SURGERY

## 2020-02-24 PROCEDURE — 25000003 PHARM REV CODE 250

## 2020-02-24 PROCEDURE — G0378 HOSPITAL OBSERVATION PER HR: HCPCS

## 2020-02-24 PROCEDURE — 97162 PT EVAL MOD COMPLEX 30 MIN: CPT

## 2020-02-24 PROCEDURE — 27000221 HC OXYGEN, UP TO 24 HOURS

## 2020-02-24 PROCEDURE — 51702 INSERT TEMP BLADDER CATH: CPT

## 2020-02-24 PROCEDURE — 36556 INSERT NON-TUNNEL CV CATH: CPT

## 2020-02-24 PROCEDURE — 97535 SELF CARE MNGMENT TRAINING: CPT

## 2020-02-24 PROCEDURE — 63600175 PHARM REV CODE 636 W HCPCS: Performed by: INTERNAL MEDICINE

## 2020-02-24 PROCEDURE — 99900035 HC TECH TIME PER 15 MIN (STAT)

## 2020-02-24 PROCEDURE — 36415 COLL VENOUS BLD VENIPUNCTURE: CPT

## 2020-02-24 PROCEDURE — 25000003 PHARM REV CODE 250: Performed by: INTERNAL MEDICINE

## 2020-02-24 PROCEDURE — 85025 COMPLETE CBC W/AUTO DIFF WBC: CPT

## 2020-02-24 PROCEDURE — 80053 COMPREHEN METABOLIC PANEL: CPT

## 2020-02-24 PROCEDURE — 84436 ASSAY OF TOTAL THYROXINE: CPT

## 2020-02-24 RX ORDER — POTASSIUM CHLORIDE 20 MEQ/1
40 TABLET, EXTENDED RELEASE ORAL
Status: DISCONTINUED | OUTPATIENT
Start: 2020-02-24 | End: 2020-03-06 | Stop reason: HOSPADM

## 2020-02-24 RX ORDER — POTASSIUM CHLORIDE 7.45 MG/ML
40 INJECTION INTRAVENOUS
Status: DISCONTINUED | OUTPATIENT
Start: 2020-02-24 | End: 2020-03-06 | Stop reason: HOSPADM

## 2020-02-24 RX ORDER — CHLORHEXIDINE GLUCONATE ORAL RINSE 1.2 MG/ML
15 SOLUTION DENTAL 2 TIMES DAILY
Status: DISPENSED | OUTPATIENT
Start: 2020-02-24 | End: 2020-02-29

## 2020-02-24 RX ORDER — MAGNESIUM SULFATE HEPTAHYDRATE 40 MG/ML
2 INJECTION, SOLUTION INTRAVENOUS
Status: DISCONTINUED | OUTPATIENT
Start: 2020-02-24 | End: 2020-03-06 | Stop reason: HOSPADM

## 2020-02-24 RX ORDER — POTASSIUM CHLORIDE 20 MEQ/1
20 TABLET, EXTENDED RELEASE ORAL
Status: DISCONTINUED | OUTPATIENT
Start: 2020-02-24 | End: 2020-03-06 | Stop reason: HOSPADM

## 2020-02-24 RX ORDER — MUPIROCIN 20 MG/G
OINTMENT TOPICAL 2 TIMES DAILY
Status: DISPENSED | OUTPATIENT
Start: 2020-02-24 | End: 2020-02-29

## 2020-02-24 RX ORDER — CALCIUM CHLORIDE IN 0.9 % NACL 1 G/100 ML
1 INTRAVENOUS SOLUTION, PIGGYBACK (ML) INTRAVENOUS
Status: DISCONTINUED | OUTPATIENT
Start: 2020-02-24 | End: 2020-03-06 | Stop reason: HOSPADM

## 2020-02-24 RX ORDER — MAGNESIUM SULFATE 1 G/100ML
1 INJECTION INTRAVENOUS
Status: DISCONTINUED | OUTPATIENT
Start: 2020-02-24 | End: 2020-03-06 | Stop reason: HOSPADM

## 2020-02-24 RX ORDER — POTASSIUM CHLORIDE 7.45 MG/ML
20 INJECTION INTRAVENOUS
Status: DISCONTINUED | OUTPATIENT
Start: 2020-02-24 | End: 2020-03-06 | Stop reason: HOSPADM

## 2020-02-24 RX ORDER — LANOLIN ALCOHOL/MO/W.PET/CERES
800 CREAM (GRAM) TOPICAL
Status: DISCONTINUED | OUTPATIENT
Start: 2020-02-24 | End: 2020-03-06 | Stop reason: HOSPADM

## 2020-02-24 RX ORDER — MAGNESIUM SULFATE HEPTAHYDRATE 40 MG/ML
4 INJECTION, SOLUTION INTRAVENOUS
Status: DISCONTINUED | OUTPATIENT
Start: 2020-02-24 | End: 2020-03-06 | Stop reason: HOSPADM

## 2020-02-24 RX ADMIN — MEROPENEM 500 MG: 500 INJECTION, POWDER, FOR SOLUTION INTRAVENOUS at 05:02

## 2020-02-24 RX ADMIN — HYDROCODONE BITARTRATE AND ACETAMINOPHEN 1 TABLET: 5; 325 TABLET ORAL at 09:02

## 2020-02-24 RX ADMIN — CHLORHEXIDINE GLUCONATE 15 ML: 1.2 RINSE ORAL at 09:02

## 2020-02-24 RX ADMIN — SERTRALINE HYDROCHLORIDE 50 MG: 50 TABLET ORAL at 09:02

## 2020-02-24 RX ADMIN — LOPERAMIDE HYDROCHLORIDE 4 MG: 2 CAPSULE ORAL at 09:02

## 2020-02-24 RX ADMIN — MAGNESIUM OXIDE 400 MG: 400 TABLET ORAL at 09:02

## 2020-02-24 RX ADMIN — CHLORHEXIDINE GLUCONATE 15 ML: 1.2 RINSE ORAL at 03:02

## 2020-02-24 RX ADMIN — MAGNESIUM SULFATE IN DEXTROSE 1 G: 10 INJECTION, SOLUTION INTRAVENOUS at 02:02

## 2020-02-24 RX ADMIN — LOPERAMIDE HYDROCHLORIDE 4 MG: 2 CAPSULE ORAL at 03:02

## 2020-02-24 RX ADMIN — POTASSIUM CHLORIDE 20 MEQ: 20 TABLET, EXTENDED RELEASE ORAL at 09:02

## 2020-02-24 RX ADMIN — MEROPENEM 500 MG: 500 INJECTION, POWDER, FOR SOLUTION INTRAVENOUS at 06:02

## 2020-02-24 RX ADMIN — LEVOTHYROXINE SODIUM 88 MCG: 88 TABLET ORAL at 05:02

## 2020-02-24 RX ADMIN — DRONABINOL 2.5 MG: 2.5 CAPSULE ORAL at 03:02

## 2020-02-24 RX ADMIN — MAGNESIUM SULFATE IN DEXTROSE 1 G: 10 INJECTION, SOLUTION INTRAVENOUS at 12:02

## 2020-02-24 RX ADMIN — HYDROCODONE BITARTRATE AND ACETAMINOPHEN 1 TABLET: 5; 325 TABLET ORAL at 03:02

## 2020-02-24 RX ADMIN — PANTOPRAZOLE SODIUM 40 MG: 40 TABLET, DELAYED RELEASE ORAL at 05:02

## 2020-02-24 RX ADMIN — MUPIROCIN: 20 OINTMENT TOPICAL at 09:02

## 2020-02-24 RX ADMIN — DRONABINOL 2.5 MG: 2.5 CAPSULE ORAL at 05:02

## 2020-02-24 RX ADMIN — MUPIROCIN: 20 OINTMENT TOPICAL at 03:02

## 2020-02-24 RX ADMIN — ONDANSETRON 8 MG: 4 TABLET, ORALLY DISINTEGRATING ORAL at 09:02

## 2020-02-24 RX ADMIN — PSYLLIUM HUSK 1 PACKET: 3.4 POWDER ORAL at 09:02

## 2020-02-24 RX ADMIN — MAGNESIUM SULFATE IN DEXTROSE 1 G: 10 INJECTION, SOLUTION INTRAVENOUS at 01:02

## 2020-02-24 NOTE — CONSULTS
Critical access hospital  General Surgery  Consult Note    Inpatient consult to General Surgery  Consult performed by: Robbi Lovell III, MD  Consult ordered by: Dalton Hammond MD  Reason for consult: High ostomy output with acute on chronic renal injury        Subjective:     Chief Complaint/Reason for Admission:  Significant dizziness and lightheadedness with high ostomy output and 20 lb weight loss since surgery      History of Present Illness:  Patient is 69-year-old female with past medical history of vulvar cancer, advanced chronic kidney disease.  She had radiation and chemotherapy for vulvar cancer in 2013.  She has had severe diarrhea since that time with decline in renal function and multiple admissions for DARCI.  She was seen in the hospital on February 10th due to increasing abdominal pain with evidence of pneumatosis in the ascending colon.  She had laparotomy with lysis of adhesions and exploration of the colon and small bowel. Extensive lysis of adhesions was performed but no abnormality was appreciated with the colon.  A diverting loop ileostomy was performed. She presented to the emergency department yesterday with progressive dizziness, weakness, lightheadedness with large volume ostomy output every hour.  She was found to have acute kidney injury with creatinine of 3.8 over a baseline of about 2.5.  CT scan was performed that showed resolution of pneumatosis without any other intra-abdominal abnormality.  She was admitted the ICU started on IV fluid resuscitation.  Patient states that her previous admission abdominal pain has resolved.  She states the incisional pain is still moderate.  She will fill up an ostomy bag about once an hour.  She has been tolerating diet but states that her appetite and p.o. intake have been very low for the last year or so.  She reports a 10 lb weight loss over the past week and a 20 lb weight loss since surgery.      No current facility-administered  medications on file prior to encounter.      Current Outpatient Medications on File Prior to Encounter   Medication Sig    calcitRIOL (ROCALTROL) 0.5 MCG Cap Take 1 capsule by mouth once daily.    dicyclomine (BENTYL) 10 MG capsule TAKE 1 CAPSULE THREE TIMES A DAY (Patient taking differently: Take 10 mg by mouth 3 (three) times daily. )    diphenhydrAMINE (BENADRYL) 25 mg capsule Take 25 mg by mouth nightly as needed for Itching.    dronabinol (MARINOL) 2.5 MG capsule Take 1 capsule (2.5 mg total) by mouth 2 (two) times daily before meals.    HYDROcodone-acetaminophen (NORCO)  mg per tablet Take 1 tablet by mouth every 6 (six) hours as needed for Pain.    levothyroxine (SYNTHROID) 88 MCG tablet TAKE 1 TABLET DAILY (Patient taking differently: Take 88 mcg by mouth every morning. )    magnesium oxide (MAG-OX) 400 mg (241.3 mg magnesium) tablet Take 400 mg by mouth 2 (two) times daily.    potassium chloride (KLOR-CON) 10 MEQ TbSR Take 20 mEq by mouth 2 (two) times daily.     promethazine (PHENERGAN) 25 MG tablet Take 1 tablet (25 mg total) by mouth daily as needed for Nausea.    sertraline (ZOLOFT) 50 MG tablet Take 50 mg by mouth once daily.     sodium bicarbonate 650 MG tablet Take 650 mg by mouth 3 (three) times daily.        Review of patient's allergies indicates:   Allergen Reactions    Ciprofloxacin Other (See Comments)     Elevated liver enzymes      Pcn [penicillins] Anaphylaxis       Past Medical History:   Diagnosis Date    Chronic kidney disease, stage III (moderate) 1/7/2018    Diabetes mellitus, type 2     Fatigue     Hypertension     Iron deficiency anemia due to chronic blood loss 1/7/2018    Vulvar cancer 1/7/2019     Past Surgical History:   Procedure Laterality Date    ABDOMINAL SURGERY      BACK SURGERY      CERVICAL FUSION      x 4    cervix repair      CHOLECYSTECTOMY  2000    DIAGNOSTIC LAPAROSCOPY N/A 2/13/2020    Procedure: LAPAROSCOPY, DIAGNOSTIC;  Surgeon:  Robbi Lovell III, MD;  Location: [facility];  Service: General;  Laterality: N/A;    HYSTERECTOMY  1980    ILEOSTOMY N/A 2020    Procedure: CREATION, ILEOSTOMY Loop;  Surgeon: Robbi Lovell III, MD;  Location: [facility] OR;  Service: General;  Laterality: N/A;    LUMBAR LAMINECTOMY      LYSIS OF ADHESIONS N/A 2020    Procedure: LYSIS, ADHESIONS;  Surgeon: Robbi Lovell III, MD;  Location: [facility] OR;  Service: General;  Laterality: N/A;    NECK SURGERY      REMOVAL OF VASCULAR ACCESS PORT Right 2020    Procedure: REMOVAL, VASCULAR ACCESS PORT;  Surgeon: Robbi Lovell III, MD;  Location: [facility] OR;  Service: General;  Laterality: Right;    SHOULDER SURGERY      vulva cancer       Family History     Problem Relation (Age of Onset)    Cancer Mother    Heart disease Mother, Father, Sister    Hypertension Mother    No Known Problems Daughter        Tobacco Use    Smoking status: Former Smoker     Packs/day: 1.00     Years: 33.00     Pack years: 33.00     Types: Cigarettes     Last attempt to quit: 2000     Years since quittin.6    Smokeless tobacco: Never Used   Substance and Sexual Activity    Alcohol use: No    Drug use: No    Sexual activity: Not on file     Review of Systems   Constitutional: Positive for activity change, fatigue and unexpected weight change. Negative for appetite change, chills and fever.   HENT: Negative for hearing loss, rhinorrhea, sore throat and voice change.    Eyes: Negative for photophobia and visual disturbance.   Respiratory: Negative for cough, choking and shortness of breath.    Cardiovascular: Negative for chest pain, palpitations and leg swelling.   Gastrointestinal: Positive for abdominal pain. Negative for blood in stool, constipation, diarrhea, nausea and vomiting.        High ileostomy output   Endocrine: Negative for cold intolerance, heat intolerance, polydipsia and polyuria.   Genitourinary: Negative for difficulty urinating, dysuria  and hematuria.   Musculoskeletal: Negative for arthralgias, back pain, joint swelling and neck stiffness.   Skin: Negative for color change, pallor and rash.   Neurological: Negative for dizziness, seizures, syncope and headaches.   Hematological: Negative for adenopathy. Does not bruise/bleed easily.   Psychiatric/Behavioral: Negative for agitation, behavioral problems and confusion.     Objective:     Vital Signs (Most Recent):  Temp: 97.7 °F (36.5 °C) (02/24/20 1105)  Pulse: 71 (02/24/20 1305)  Resp: 19 (02/24/20 1305)  BP: (!) 92/56 (02/24/20 1305)  SpO2: 100 % (02/24/20 1305) Vital Signs (24h Range):  Temp:  [97.4 °F (36.3 °C)-99.4 °F (37.4 °C)] 97.7 °F (36.5 °C)  Pulse:  [58-83] 71  Resp:  [11-62] 19  SpO2:  [69 %-100 %] 100 %  BP: ()/(50-84) 92/56     Weight: 52.4 kg (115 lb 8.3 oz)  Body mass index is 18.09 kg/m².      Intake/Output Summary (Last 24 hours) at 2/24/2020 1636  Last data filed at 2/24/2020 1400  Gross per 24 hour   Intake 1695 ml   Output 1725 ml   Net -30 ml       Physical Exam   Constitutional: She is oriented to person, place, and time. Vital signs are normal. She is cooperative.  Non-toxic appearance. She has a sickly appearance. No distress.   Pulmonary/Chest: Effort normal. No tachypnea and no bradypnea. No respiratory distress.   Abdominal: Soft. There is tenderness. There is no rigidity, no rebound and no guarding. No hernia.       Abdomen is tender near incision and right-sided ostomy.  There is no cellulitis along the incision.  The incision is clean dry and intact.  Ostomy is viable and working.   Neurological: She is alert and oriented to person, place, and time.       Significant Labs:  CBC:   Recent Labs   Lab 02/24/20  0350   WBC 8.67   RBC 2.53*   HGB 8.2*   HCT 26.3*      *   MCH 32.4*   MCHC 31.2*     CMP:   Recent Labs   Lab 02/24/20  0350   GLU 96   CALCIUM 8.1*   ALBUMIN 2.9*   PROT 4.7*      K 3.6   CO2 41*   CL 85*   BUN 33*   CREATININE 3.8*    ALKPHOS 168*   ALT 20   AST 25   BILITOT 1.6*       Significant Diagnostics:  I have reviewed all pertinent imaging results/findings within the past 24 hours.    Assessment/Plan:     Active Diagnoses:    Diagnosis Date Noted POA    PRINCIPAL PROBLEM:  Severe dehydration due to chronic diarrhea/high output ileostomy [E86.0] 02/24/2020 Yes    DNR (do not resuscitate) [Z66] 02/24/2020 Yes    Electrolyte imbalance [E87.8] 02/24/2020 Yes    DARIC (acute kidney injury) [N17.9] 02/23/2020 Yes      Problems Resolved During this Admission:   -patient is having high ostomy output.  She is admitted with significant dehydration, acute on chronic kidney injury. She states she has not been able to keep up with taking in enough fluids to counteract her ostomy output loss.  Will order fiber supplement 3 times a day to try to bulk up the ostomy output. Will reverse ostomy if output continues to cause significant issues. Reversal usually performed at 12 weeks post op from ostomy placement.     Thank you for your consult.     Robbi Lovell III, MD  General Surgery  [facility]

## 2020-02-24 NOTE — PROGRESS NOTES
[facility] Medicine    Progress Note    Patient Name: Shara Hutchins  MRN: [id number]  Patient Class: OP- Observation   Admission Date: 2/23/2020  2:16 PM  Length of Stay: 0  Attending Physician: MARILYNN PANDA MD  Primary Care Provider: April Horton MD  Face-to-Face encounter date: 02/24/2020  Chief Complaint: Fatigue (Recent ileosomy, 20lb weight loss this week, vaginal bleeding)         Patient ID: Shara Hutchins is a 69 y.o. female admitted for   Active Hospital Problems    Diagnosis  POA    DARCI (acute kidney injury) [N17.9]  Yes      Resolved Hospital Problems   No resolved problems to display.      HPI by Dr Hammond on admission 02/23/2020:   Shara Hutchins is a 69 y.o. White female who  has a past medical history of Chronic kidney disease, stage III (moderate) (1/7/2018), Diabetes mellitus, type 2, Fatigue, Hypertension, Iron deficiency anemia due to chronic blood loss (1/7/2018), and Vulvar cancer (1/7/2019).. The patient presented to [facility] on 2/23/2020 with a primary complaint of weight loss.    History was obtained from the patient and the family present at the bedside and ER physician Sign-out. Patient was recently discharged (2/17) six days ago, from the [facility] after ileostomy formation for the concern of pneumatosis intestinalis. Since the discharge she is having constant output from the ileostomy bag (around 1 bag every 30 minutes). 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 Family thinks that her problem has just worsened after the surgery to that extent that she is now unable to keep any thing in the stomach. All her pills come out without absorption directly in her ileostomy bag. She has lost over 20 lbs in less than a week time. Today, she was so weak and dizzy that they have to bring her to the emergency room for further evaluation.    In the  emergency room, patient CBC was unremarkable. CMP was unremarkable. BNP and troponin was negative. Reviewed EKG and does not show new ischemic changes. No concerning finding on chest x ray.   Decision to admit was taken and patient was informed about the plan of care.     Subjective:    Interval History: Patient seen and examined this morning.  Patient laying in bed comfortably.  Patient reports she is feeling a little better but very concerned about her weight loss of 20 lb in 1 week.  Patient states she already had diarrhea due to her chronic colitis but now with ileostomy it is nonstop.  Patient states she is known to Dr. Wynn for her chronic diarrhea.  Discussed with patient that I will consult Gastroenterology to help with the high output ileostomy.  Discussed with patient that the admitting physician already has consulted her surgeon and the nephrologist for electrolyte imbalance, discussed will also consult dietitian to help with other options.  Patient verbalized understanding   Discussed with patient that I will keep her in ICU to her evaluation is completed.  Currently her blood pressure is running low around 92/56.  Not on vasopressors but will need to be monitored closely    No Family present at bedside.  No concerns/issues overnight reported by the patient or the nursing staff.    Review of Systems All other Review of Systems were found to be negative expect for that mentioned already in HPI.     Objective:     Physical Exam  Vitals:    02/24/20 1105   BP: 94/52   Pulse: 62   Resp: 16   Temp: 97.7     Wt Readings from Last 1 Encounters:   02/24/20 52.4 kg (115 lb 8.3 oz)      Body mass index is 18.09 kg/m².    Vitals reviewed.  Constitutional: No distress. Malnourished, under weight  HENT:  Normocephalic  Head: Atraumatic.   Cardiovascular: Normal rate, regular rhythm and normal heart sounds.   Pulmonary/Chest: Effort normal. No wheezes.   Abdominal: Soft. Bowel sounds are normal. No distension and  no mass. No tenderness, ileostomy bag right abdomen  Neurological: Alert and oriented to time and place  Skin: Skin is warm and dry.     Following labs were Reviewed   CBC:  Recent Labs   Lab 02/24/20  0350   WBC 8.67   HGB 8.2*   HCT 26.3*        CMP:  Recent Labs   Lab 02/24/20  0350   CALCIUM 8.1*   ALBUMIN 2.9*   PROT 4.7*      K 3.6   CO2 41*   CL 85*   BUN 33*   CREATININE 3.8*   ALKPHOS 168*   ALT 20   AST 25   BILITOT 1.6*     Last 72 hour POCT GLUCOSE  No results found for: POCTGLUCOSE     Microbiology Results (last 7 days)     Procedure Component Value Units Date/Time    Urine culture [[id number]] Collected:  02/23/20 1715    Order Status:  Completed Specimen:  Urine Updated:  02/24/20 0655     Urine Culture, Routine No growth to date    Narrative:       Preferred Collection Type->Urine, Clean Catch  Specimen Source->Urine    Clostridium difficile EIA [[id number]] Collected:  02/23/20 2124    Order Status:  Completed Specimen:  Stool Updated:  02/23/20 2329     C. diff Antigen Negative     C difficile Toxins A+B, EIA Negative     Comment: Testing not recommended for children <24 months old.       Narrative:       Recoll. [id number] by CD3 at 02/23/2020 20:57, reason: Container not   labeled    Blood culture #1 **CANNOT BE ORDERED STAT** [[id number]] Collected:  02/23/20 1530    Order Status:  Completed Specimen:  Blood from Peripheral, Upper Arm, Right Updated:  02/23/20 2158     Blood Culture, Routine No Growth to date    Blood culture #2 **CANNOT BE ORDERED STAT** [[id number]] Collected:  02/23/20 1545    Order Status:  Completed Specimen:  Blood from Peripheral, Upper Arm, Right Updated:  02/23/20 2158     Blood Culture, Routine No Growth to date            X-Ray Chest AP Portable   Final Result      CT Abdomen Pelvis  Without Contrast   Final Result            Scheduled Meds:   dronabinol  2.5 mg Oral BID AC    levothyroxine  88 mcg Oral QAM    loperamide  4 mg Oral QID    magnesium oxide   400 mg Oral BID    meropenem (MERREM) IVPB  500 mg Intravenous Q12H    pantoprazole  40 mg Oral Daily    potassium chloride  20 mEq Oral BID    sertraline  50 mg Oral Daily     Continuous Infusions:   lactated ringers 100 mL/hr at 02/23/20 2227     PRN Meds:.acetaminophen, dextrose 50%, dextrose 50%, glucagon (human recombinant), glucose, glucose, HYDROcodone-acetaminophen, melatonin, ondansetron, sodium chloride 0.9%, Pharmacy to dose Vancomycin consult **AND** vancomycin - pharmacy to dose      Assessment & Plan:     Severe dehydration/ High output Ostomy; hypotension  Due to chemoradiation therapy induced chronic diarrhea worsened after ileostomy.   Blood pressure 94/52, asymptomatic for now, not on pressors  Will monitor in ICU today  Continue Ringer lactate at 100 cc/hour for now  Loperamide 4mg QID.   Restarted on protonix and magnesium oxide  General surgery Dr. Lovell consulted-awaiting recommendation  Dietitian consulted  Gastroenterology Dr Wynn consulted for high output ileostomy-awaiting recommendation  Nephrology consulted for Stephen on CKD, appreciate the input    Malnutrition:   Patient reports weight loss of 20 lb in 1 week  She clearly has malabsorption and needs to have alternate source of nutrition.   May be consider TPN that can help her to gain strength back so that she is not dehydrated.   Dietitian consulted for alternative options    Lactic Acidosis-resolved  Repeat lactic acid 1.0, was 4.4 on admission-likely due to dehydration.   Do not think she has infection.   Continue IV antibiotics as patient has abdominal surgery last week.   C-diff negative     STEPHEN on CKD V:   Continue IV fluids.   Consulted Nephrology, appreciate the input  Avoid NSAID, Ace inhibitors, arbs  Renal dose all medications      Vulvar cancer with skin excoriation due to XRT  Consulted wound care  If needed will consult Oncology Dr. Francis     Hypothyroidism:   continue Levothyroxine.   Not changing the dose.    Likely elevated due to poor absorption.   TSH 7.18, free T4 1.16     Deconditioning:   PT/OT consulted in house  Patient does have  [facility] home health with PT/OT/skilled nursing which will be resumed upon discharge     Hypomagnesemia-resolved  Replaced.   Due to #3.  Electrolyte sliding scale in place    Noted patient has a DNR status per her request     DVT Prophylaxis: will be placed on SCDs for DVT prophylaxis and will be advised to be as mobile as possible and sit in a chair as tolerated.   ________________________________________________________________________________      Discharge Planning:   PT OT consulted in house  Patient already has [facility] home health, will be resumed with PT/OT and skilled nursing upon discharge    Above encounter included review of the medical records, interviewing and examining the patient face-to-face, discussion with family and other health care providers, ordering and interpreting lab/test results and formulating a plan of care.     Medical Decision Making:      [_] Low Complexity  [_] Moderate Complexity  [x] High Complexity      Neel Vu MD  Department of Hospital Medicine   Critical access hospital

## 2020-02-24 NOTE — PROGRESS NOTES
VANCOMYCIN PHARMACOKINETIC NOTE:  Vancomycin Day # 1    Objective/Assessment:    Diagnosis/Indication for Vancomycin: Intra-abdominal infection and Skin & Soft Tissue infection     69 y.o., female; Actual Body Weight = 51.4 kg (113 lb 5.1 oz).    The patient has the following labs:  2/23/2020 Estimated Creatinine Clearance: 11 mL/min (A) (based on SCr of 3.9 mg/dL (H)). Lab Results   Component Value Date    BUN 36 (H) 02/23/2020       Lab Results   Component Value Date    WBC 6.26 02/23/2020          Plan:  Adjust vancomycin dose and/or frequency based on the patient's actual weight and renal function:  Initiate Vancomycin 750 mg IV once (intermittent dosing).  Orders have been entered into patient's chart.    Vancomycin dose = 750-1000mg actual body weight    Vancomycin trough level has been ordered for 2/24/20 @ 22:00    Pharmacy will manage vancomycin therapy, monitor serum vancomycin levels, monitor renal function and adjust regimen as necessary.    Thank you for allowing us to participate in this patient's care.     Aubrey Zuniga 2/23/2020 9:52 PM  Department of Pharmacy  Ext [phone]

## 2020-02-24 NOTE — H&P
[facility] Medicine History & Physical Examination   Patient Name: Shara Hutchins  MRN: [id number]  Patient Class: OP- Observation   Admission Date: 2/23/2020  2:16 PM  Length of Stay: 0  Attending Physician: Dalton Hammond MD  Primary Care Provider: April Horton MD  Face-to-Face encounter date: 02/23/2020  Code Status: DNR  Chief Complaint: Fatigue (Recent ileosomy, 20lb weight loss this week, vaginal bleeding)        Patient information was obtained from patient, past medical records and ER records.   HISTORY OF PRESENT ILLNESS:   Shara Hutchins is a 69 y.o. White female who  has a past medical history of Chronic kidney disease, stage III (moderate) (1/7/2018), Diabetes mellitus, type 2, Fatigue, Hypertension, Iron deficiency anemia due to chronic blood loss (1/7/2018), and Vulvar cancer (1/7/2019).. The patient presented to [facility] on 2/23/2020 with a primary complaint of weight loss.      History was obtained from the patient and the family present at the bedside and ER physician Sign-out. Patient was recently discharged (2/17) six days ago, from the [facility] after ileostomy formation for the concern of pneumatosis intestinalis. Since the discharge she is having constant output from the ileostomy bag (around 1 bag every 30 minutes).  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 Family thinks that her problem has just worsened after the surgery to that extent that she is now unable to keep any thing in the stomach. All her pills come out without absorption directly in her ileostomy bag. She has lost over 20 lbs in less than a week time. Today, she was so weak and dizzy that they have to bring her to the emergency room for further evaluation.      In the emergency room, patient CBC was unremarkable. CMP was unremarkable. BNP and troponin was negative. Reviewed  EKG and does not show new ischemic changes. No concerning finding on chest x ray.     Decision to admit was taken and patient was informed about the plan of care.   REVIEW OF SYSTEMS:   10 Point Review of System was performed and was found to be negative except for that mentioned already in the HPI above.     PAST MEDICAL HISTORY:     Past Medical History:   Diagnosis Date    Chronic kidney disease, stage III (moderate) 1/7/2018    Diabetes mellitus, type 2     Fatigue     Hypertension     Iron deficiency anemia due to chronic blood loss 1/7/2018    Vulvar cancer 1/7/2019       PAST SURGICAL HISTORY:     Past Surgical History:   Procedure Laterality Date    ABDOMINAL SURGERY      BACK SURGERY      CERVICAL FUSION      x 4    cervix repair      CHOLECYSTECTOMY  2000    DIAGNOSTIC LAPAROSCOPY N/A 2/13/2020    Procedure: LAPAROSCOPY, DIAGNOSTIC;  Surgeon: Robbi Lovell III, MD;  Location: [facility];  Service: General;  Laterality: N/A;    HYSTERECTOMY  1980    ILEOSTOMY N/A 2/13/2020    Procedure: CREATION, ILEOSTOMY Loop;  Surgeon: Robbi Lovell III, MD;  Location: [facility];  Service: General;  Laterality: N/A;    LUMBAR LAMINECTOMY      LYSIS OF ADHESIONS N/A 2/13/2020    Procedure: LYSIS, ADHESIONS;  Surgeon: Robbi Lovell III, MD;  Location: [facility];  Service: General;  Laterality: N/A;    NECK SURGERY      REMOVAL OF VASCULAR ACCESS PORT Right 2/13/2020    Procedure: REMOVAL, VASCULAR ACCESS PORT;  Surgeon: Robbi Lovell III, MD;  Location: [facility];  Service: General;  Laterality: Right;    SHOULDER SURGERY      vulva cancer         ALLERGIES:   Ciprofloxacin and Pcn [penicillins]    FAMILY HISTORY:     Family History   Problem Relation Age of Onset    Heart disease Mother     Hypertension Mother     Cancer Mother         lung    Heart disease Father     Heart disease Sister         Open heart surgery    No Known Problems Daughter        SOCIAL HISTORY:     Social History      Tobacco Use    Smoking status: Former Smoker     Packs/day: 1.00     Years: 33.00     Pack years: 33.00     Types: Cigarettes     Last attempt to quit: 2000     Years since quittin.6    Smokeless tobacco: Never Used   Substance Use Topics    Alcohol use: No        Social History     Substance and Sexual Activity   Sexual Activity Not on file        HOME MEDICATIONS:     Prior to Admission medications    Medication Sig Start Date End Date Taking? Authorizing Provider   calcitRIOL (ROCALTROL) 0.5 MCG Cap Take 1 capsule by mouth once daily. 10/3/19  Yes Historical Provider, MD   dicyclomine (BENTYL) 10 MG capsule TAKE 1 CAPSULE THREE TIMES A DAY  Patient taking differently: Take 10 mg by mouth 3 (three) times daily.  10/14/19  Yes April Horton MD   diphenhydrAMINE (BENADRYL) 25 mg capsule Take 25 mg by mouth nightly as needed for Itching.   Yes Historical Provider, MD   dronabinol (MARINOL) 2.5 MG capsule Take 1 capsule (2.5 mg total) by mouth 2 (two) times daily before meals. 20  Yes April Horton MD   HYDROcodone-acetaminophen (NORCO)  mg per tablet Take 1 tablet by mouth every 6 (six) hours as needed for Pain. 20  Yes Grant Ravi MD   levothyroxine (SYNTHROID) 88 MCG tablet TAKE 1 TABLET DAILY  Patient taking differently: Take 88 mcg by mouth every morning.  10/12/19  Yes April Horton MD   magnesium oxide (MAG-OX) 400 mg (241.3 mg magnesium) tablet Take 400 mg by mouth 2 (two) times daily.   Yes Historical Provider, MD   potassium chloride (KLOR-CON) 10 MEQ TbSR Take 20 mEq by mouth 2 (two) times daily.  19  Yes Historical Provider, MD   promethazine (PHENERGAN) 25 MG tablet Take 1 tablet (25 mg total) by mouth daily as needed for Nausea. 19  Yes April Horton MD   sertraline (ZOLOFT) 50 MG tablet Take 50 mg by mouth once daily.    Yes Historical Provider, MD   sodium bicarbonate 650 MG tablet Take 650 mg by mouth 3 (three) times daily.  18  Yes  Historical Provider, MD         PHYSICAL EXAM:   /67   Pulse 74   Temp 98 °F (36.7 °C) (Oral)   Resp (!) 44   Wt 50.3 kg (111 lb)   SpO2 100%   BMI 17.39 kg/m²   Vitals Reviewed  General appearance: Very weak and sick appearing White female in apparent distress. Feeling very cold  Skin: Very thin skin.  Neuro: Motor and sensory exams grossly intact. Good tone. Power in all 4 extremities 5/5.   HENT: Atraumatic head. Moist mucous membranes of oral cavity.  Eyes: Normal extraocular movements.   Neck: Supple. No evidence of lymphadenopathy. No thyroidomegaly.  Lungs: Clear to auscultation bilaterally. No wheezing present.   Heart: Regular rate and rhythm. S1 and S2 present with no murmurs/gallop/rub. No pedal edema. No JVD present.   Abdomen: Soft, non-distended, non-tender. No rebound tenderness/guarding. No masses or organomegaly. Bowel sounds are normal. Bladder is not palpable. Ileostomy looks good.  Extremities: No cyanosis, clubbing.  Psych/mental status: Very lethargic but able to answer my questions.   EMERGENCY DEPARTMENT LABS AND IMAGING:     Labs Reviewed   CBC W/ AUTO DIFFERENTIAL - Abnormal; Notable for the following components:       Result Value    RBC 3.25 (*)     Hemoglobin 10.2 (*)     Hematocrit 33.8 (*)     Mean Corpuscular Volume 104 (*)     Mean Corpuscular Hemoglobin 31.4 (*)     Mean Corpuscular Hemoglobin Conc 30.2 (*)     RDW 14.9 (*)     Lymph # 0.8 (*)     Gran% 79.6 (*)     Lymph% 13.1 (*)     All other components within normal limits   COMPREHENSIVE METABOLIC PANEL - Abnormal; Notable for the following components:    Chloride 79 (*)     CO2 40 (*)     BUN, Bld 36 (*)     Creatinine 3.9 (*)     Total Bilirubin 1.9 (*)     Alkaline Phosphatase 231 (*)     Anion Gap 25 (*)     eGFR if  12.8 (*)     eGFR if non  11.1 (*)     All other components within normal limits   LACTIC ACID, PLASMA - Abnormal; Notable for the following components:    Lactate  (Lactic Acid) 4.4 (*)     All other components within normal limits    Narrative:     Lactic Acid critical result(s) called and verbal readback obtained   from CHERRY MARRERO  by KB5 02/23/2020 16:11   PROTIME-INR - Abnormal; Notable for the following components:    PT 16.6 (*)     All other components within normal limits   TSH - Abnormal; Notable for the following components:    TSH 7.180 (*)     All other components within normal limits   URINALYSIS, REFLEX TO URINE CULTURE - Abnormal; Notable for the following components:    pH, UA >8.0 (*)     Protein, UA 1+ (*)     Leukocytes, UA 1+ (*)     All other components within normal limits    Narrative:     Preferred Collection Type->Urine, Clean Catch  Specimen Source->Urine   MAGNESIUM - Abnormal; Notable for the following components:    Magnesium 1.5 (*)     All other components within normal limits   URINALYSIS MICROSCOPIC - Abnormal; Notable for the following components:    WBC, UA 19 (*)     Hyaline Casts, UA 19 (*)     All other components within normal limits    Narrative:     Preferred Collection Type->Urine, Clean Catch  Specimen Source->Urine   CULTURE, BLOOD   CULTURE, BLOOD   CULTURE, URINE   CLOSTRIDIUM DIFFICILE   LIPASE   T4, FREE   LACTIC ACID, PLASMA   TYPE & SCREEN       CT Abdomen Pelvis  Without Contrast   Final Result          ASSESSMENT & PLAN:   Shara Hutchins is a 69 y.o. female admitted for    1. Severe dehydration: Due to chemoradiation therapy induced chronic diarrhea worsened after ileostomy. I will hydrate her with IV fluids. Follow her lactic acid.    2. Malnutrition: She clearly has no absorption and needs to have alternate source of nutrition. She has lost more than 20 lbs in one week. May be consider TPN that can help her to gain strength back so that she is not dehydrated. Discussed in detail with the family about the long term goals. Day time hospitalist to further clarify goals of care.     3. High output Stoma: See above.  Causing dehydration and malnutrition. Loperamide 4mg QID. Started protonix and magnesium oxide back.    4. Lactic Acidosis: likely due to dehydration. Do not think she has infection. Continue IV antibiotics as patient has abdominal surgery last week. Check for C-diff.     5. DARCI on CKD V: IV fluids. Consulted Nephrology for further recommendations.    6. Vulvar bleeding: Had bleeding this morning. Discussed with ED physician who examined her and reported bleeding from excoriations. Defer oncology consultation to day time hospitalist.     7. Hypothyroidism: continue Levothyroxine. Not changing the dose. Likely elevated due to poor absorption. Check t4 and t3.    8. Deconditioning: PT/OT consulted    9. Hypomagnesemia: Replace. Due to #3.    DVT Prophylaxis: will be placed on SCDs for DVT prophylaxis and will be advised to be as mobile as possible and sit in a chair as tolerated.   ________________________________________________________________________________    Discharge Planning and Disposition: PT/OT consulted  Face-to-Face encounter date: 02/23/2020  Encounter included review of the medical records, interviewing and examining the patient face-to-face, discussion with family and other health care providers including emergency medicine physician, admission orders, interpreting lab/test results and formulating a plan of care.   Medical Decision Making during this encounter was  [_] Low Complexity  [_] Moderate Complexity  [x] High Complexity  _________________________________________________________________________________    INPATIENT LIST OF MEDICATIONS     Current Facility-Administered Medications:     lactated ringers infusion, , Intravenous, Continuous, Dalton Hammond MD    loperamide capsule 4 mg, 4 mg, Oral, QID, Dalton Hammond MD    magnesium sulfate 3 g in dextrose 5 % 250 mL IVPB, 3 g, Intravenous, Once, Dalton Hammond MD    meropenem 1 g in sodium chloride 0.9 % 100 mL IVPB (ready to  mix system), 1 g, Intravenous, Q8H, Dalton Hammond MD    Pharmacy to dose Vancomycin consult, , , Once **AND** vancomycin - pharmacy to dose, , Intravenous, pharmacy to manage frequency, Jacob Shields MD    Pharmacy to dose Vancomycin consult, , , Once **AND** vancomycin - pharmacy to dose, , Intravenous, pharmacy to manage frequency, Dalton Hammond MD    vancomycin in dextrose 5 % 1 gram/250 mL IVPB 1,000 mg, 1,000 mg, Intravenous, ED 1 Time, Jacob Shields MD      Scheduled Meds:   loperamide  4 mg Oral QID    magnesium sulfate IVPB  3 g Intravenous Once    meropenem (MERREM) IVPB  1 g Intravenous Q8H    vancomycin (VANCOCIN) IVPB  1,000 mg Intravenous ED 1 Time     Continuous Infusions:   lactated ringers       PRN Meds:.Pharmacy to dose Vancomycin consult **AND** vancomycin - pharmacy to dose, Pharmacy to dose Vancomycin consult **AND** vancomycin - pharmacy to dose      Dalton Hammond  [facility] Hospitalist  02/23/2020

## 2020-02-24 NOTE — HOSPITAL COURSE
69-year-old white female with known past medical history of vulvar cancer, hypertension, hypothyroidism, chronic diarrhea due to colitis due to chemotherapy, recently underwent ileostomy due to pneumatosis intestinalis admitted 02/23/2020 with severe dehydration due to high output ileostomy.  Patient reported weight loss of 20 lb in 1 week, since discharge from the hospital  Patient admitted to ICU, currently hypotensive but not on vasopressor.  Awake alert and oriented.  Patient requested a DNR status while in house.  General surgery, nephrology, Gastroenterology, dietitian and wound care consulted    3/3: I have consulted IR for central line placement. Pt has a right femoral line in placed for TPN.  3/4: Pt is feeling better but we are unable to obtain long term venous access. IR and anesthesia are not options here. Plan: called transfer center line and will attempt to transfer to [facility] or [facility]    3/5: We are awaiting word from the transfer center re referral for venous access.  Pt refuses to take K+ liquid and the pills will not be absorbed. Her K+ is 3.9.  Pt has a stage 2 decubitus on her sacrum treated with turning, partitioning and local care    At this point I will discontinue antibiotics as pt has been on them since 2/23/2020  Please continue TPN

## 2020-02-24 NOTE — PLAN OF CARE
02/24/20 1155   HAND Message   Medicare Outpatient and Observation Notification regarding financial responsibility Given to patient/caregiver;Explained to patient/caregiver;Signed/date by patient/caregiver   Date HAND was signed 02/24/20   Time HAND was signed 1148

## 2020-02-24 NOTE — PLAN OF CARE
Assessment done. Pt has [facility]. Will resume SN/PT/OT at discharge. Cm to follow until discharge from hospital.     02/24/20 1156   Discharge Assessment   Assessment Type Discharge Planning Assessment   Confirmed/corrected address and phone number on facesheet? Yes   Assessment information obtained from? Patient   Communicated expected length of stay with patient/caregiver no   Prior to hospitilization cognitive status: Alert/Oriented   Prior to hospitalization functional status: Independent   Current cognitive status: Alert/Oriented   Current Functional Status: Needs Assistance   Lives With alone   Able to Return to Prior Arrangements yes   Is patient able to care for self after discharge? Unable to determine at this time (comments)   Who are your caregiver(s) and their phone number(s)? Stephanie Aguiar [phone]  Julieta argueta  [phone]   Patient's perception of discharge disposition home health  (Resume with [facility])   Readmission Within the Last 30 Days previous discharge plan unsuccessful   If yes, most recent facility name: [facility]   Patient currently being followed by outpatient case management? No   Patient currently receives any other outside agency services? No   Equipment Currently Used at Home none   Part D Coverage BCBS    Do you have any problems affording any of your prescribed medications? No   Is the patient taking medications as prescribed? yes   Does the patient have transportation home? Yes   Transportation Anticipated family or friend will provide   Does the patient receive services at the [facility]? No   Discharge Plan A Home Health   DME Needed Upon Discharge  other (see comments)  (PT/OT/RT to assess)   Patient/Family in Agreement with Plan yes   Readmission Questionnaire   At the time of your discharge, did someone talk to you about what your health problems were? Yes   At the time of discharge, did someone talk to you about what to watch out for  regarding worsening of your health problem? Yes   At the time of discharge, did someone talk to you about what to do if you experienced worsening of your health problem? Yes   At the time of discharge, did someone talk to you about which medication to take when you left the hospital and which ones to stop taking? Yes   At the time of discharge, did someone talk to you about when and where to follow up with a doctor after you left the hospital? Yes   What do you believe caused you to be sick enough to be re-admitted? Pt states she lost 6 lbs overnight.   How often do you need to have someone help you when you read instructions, pamphlets, or other written material from your doctor or pharmacy? Never   Do you have problems taking your medications as prescribed? No   Do you have any problems affording any of  your prescribed medications? No   Do you have problems obtaining/receiving your medications? No   Does the patient have transportation to healthcare appointments? Yes   Living Arrangements house   Does the patient have family/friends to help with healtcare needs after discharge? yes   Does your caregiver provide all the help you need? Yes   Are you currently feeling confused? Yes  (At times per pt.)   Are you currently having problems thinking? No   Are you currently having memory problems? No   Have you felt down, depressed, or hopeless? 2  (Found sister dead before Christmas)   Have you felt little interest or pleasure in doing things? 2   In the last 7 days, my sleep quality was: fair

## 2020-02-24 NOTE — PT/OT/SLP EVAL
Physical Therapy Evaluation    Patient Name:  Shara Hutchins   MRN:  [id number]    Recommendations:     Discharge Recommendations:  home health PT   Discharge Equipment Recommendations: walker, rolling   Barriers to discharge: None    Assessment:     Shara Hutchins is a 69 y.o. female admitted with a medical diagnosis of acute kidney injury, s/p recent ileostomy. Pt has been staying with her cousin since surgery. She has been emptying her ostomy bag hourly 2/2 high output.  She presents with the following impairments/functional limitations:  weakness, impaired endurance, impaired self care skills, impaired functional mobilty, gait instability, impaired balance . Pt was limited with functional mobility during PT eval 2/2 severe abdominal pain not subsided with pain medication.  Recommend continued HH PT upon D/C.  Also recommend RW.      Rehab Prognosis: Good; patient would benefit from acute skilled PT services to address these deficits and reach maximum level of function.    Recent Surgery: * No surgery found *      Plan:     During this hospitalization, patient to be seen 6 x/week to address the identified rehab impairments via gait training, therapeutic activities, therapeutic exercises and progress toward the following goals:    · Plan of Care Expires:  03/24/20    Subjective     Chief Complaint: abdominal pain  Patient/Family Comments/goals: home with her cousin  Pain/Comfort:  ·      Patients cultural, spiritual, Baptism conflicts given the current situation:      Living Environment:  Pt has been staying with her cousin in a 1 story house w/o entry steps.  Prior to admission, patients level of function was Min A .  Equipment used at home: none. Upon discharge, patient will have assistance from her cousin    Objective:     Communicated with nurse prior to session.  Patient found supine with    upon PT entry to room.    General Precautions: Standard,     Orthopedic Precautions:    Braces:       Exams:  · Cognitive  Exam:  Patient is oriented to Person, Place, Time and Situation.  Pt required extra time to respond to year and reason for this admit  · RLE ROM: WFL  · RLE Strength: WFL  · LLE ROM: WFL  · LLE Strength: WFL    Functional Mobility:  · Bed Mobility:     · Supine to Sit: minimum assistance  · Sit to Supine: minimum assistance  · Transfers:     · Sit to Stand:  minimum assistance with hand-held assist      Therapeutic Activities and Exercises:t/f training in and OOB with Min A    AM-PAC 6 CLICK MOBILITY  Total Score:17     Patient left supine with all lines intact, call button in reach and bed alarm on.    GOALS:   Multidisciplinary Problems     Physical Therapy Goals        Problem: Physical Therapy Goal    Goal Priority Disciplines Outcome Goal Variances Interventions   Physical Therapy Goal     PT, PT/OT      Description:  Goals to be met by: D/C     Patient will increase functional independence with mobility by performin. Supine to sit with Contact Guard Assistance  2. Sit to stand transfer with Contact Guard Assistance  3. Bed to chair transfer with Contact Guard Assistance using Rolling Walker  4. Gait  x 100 feet with Contact Guard Assistance using Rolling Walker.                       History:     Past Medical History:   Diagnosis Date    Chronic kidney disease, stage III (moderate) 2018    Diabetes mellitus, type 2     Fatigue     Hypertension     Iron deficiency anemia due to chronic blood loss 2018    Vulvar cancer 2019       Past Surgical History:   Procedure Laterality Date    ABDOMINAL SURGERY      BACK SURGERY      CERVICAL FUSION      x 4    cervix repair      CHOLECYSTECTOMY      DIAGNOSTIC LAPAROSCOPY N/A 2020    Procedure: LAPAROSCOPY, DIAGNOSTIC;  Surgeon: Robbi Lovell III, MD;  Location: [facility];  Service: General;  Laterality: N/A;    HYSTERECTOMY  1980    ILEOSTOMY N/A 2020    Procedure: CREATION, ILEOSTOMY Loop;  Surgeon: Robbi Lovell  MD GLENNA;  Location: [facility];  Service: General;  Laterality: N/A;    LUMBAR LAMINECTOMY      LYSIS OF ADHESIONS N/A 2/13/2020    Procedure: LYSIS, ADHESIONS;  Surgeon: Robbi Lovell III, MD;  Location: [facility];  Service: General;  Laterality: N/A;    NECK SURGERY      REMOVAL OF VASCULAR ACCESS PORT Right 2/13/2020    Procedure: REMOVAL, VASCULAR ACCESS PORT;  Surgeon: Robbi Lovell III, MD;  Location: [facility];  Service: General;  Laterality: Right;    SHOULDER SURGERY      vulva cancer         Time Tracking:     PT Received On: 02/24/20  PT Start Time: 0955     PT Stop Time: 1020  PT Total Time (min): 25 min     Billable Minutes: Evaluation 15 minutes  Personal Care 10 minutes      Tamica Dubon PT  02/24/2020

## 2020-02-24 NOTE — PT/OT/SLP PROGRESS
Occupational Therapy      Patient Name:  Shara Hutchins   MRN:  [id number]    Patient not seen today secondary to Patient ill (pt stating she's not feeling good today). Will follow-up next service date.    Nithya Sterling OT  2/24/2020

## 2020-02-24 NOTE — PLAN OF CARE
02/24/20 1207   Discharge Reassessment   Assessment Type Discharge Planning Reassessment   Anticipated Discharge Disposition Home-Health   Patient choice form signed by patient/caregiver List with quality metrics by geographic area provided   Cm wants to keep [facility] when she is discharged. Pt signed Patient Choice Form.    Cm scanned Patient Choice Form to .

## 2020-02-24 NOTE — CONSULTS
[facility]  Adult Nutrition   Consult Note (Initial Assessment)     SUMMARY     Recommendations  Recommendation/Intervention:   1. RD added Ensure enlive TID (to provide 1050 kcal/day and 60 g/day protein).   2. If ileostomy output remains high, will recommend NPO status and may recommend nutrition support via parenteral nutrition.   3. Recommend that a medical diagnosis of malnutrition be added to patient's problem list if physician agrees with dietitian's Nutrition Focused Physical Exam (NFPE) findings that support medical diagnosis:  · Severe malnutrition in the context of chronic illness related to suspected malabsorption due to chronic diarrhea and high ostomy ileostomy output as evidence by patient states she eats fairly well at home but cannot eat enough to prevent weight loss, high ileostomy output per I/Os, RN and hospital problem list, severe dehydration and electrolyte imbalance, severe weight loss of 11.4 kg (17.87%) in < 3 months, and physical findings of mild fat depletion of orbitals and triceps; severe fat depletion of thoracic and lumbar region; severe muscle depletion of temples, clavicles, scapular region and interosseous; and generalized trace edema noted.     Goals:   1. Patient to meet at least 75% of estimated energy and protein needs via PO intake of meals and supplements.   2. Ileostomy output to be decreased.   3. Malnutrition to be added to problem list if MD agrees.     Nutrition Goal Status: new  Communication of RD Recs: reviewed with RN     Dietitian Rounds Brief  Patient states she has had diarrhea over the past 13 years. UBW 13 years ago was about 269 lbs, she has never really been able to find reasoning for diarrhea. More recently patient had ileostomy on 2/13/20. She has been having high ostomy output. Patient is unsure of her new UBW but knows she has lost a significant amount of weight recently. RD confirmed weight loss with EMR weight history. RD performed NFPE  and reviewed findings and plan of care with patient and RN.     Malnutrition Assessment  Clinical Characteristic:    Malnutrition in the context of chronic illness  Energy Intake:    <75% of estimated energy requirement for > 1 month   Interpretation of Weight Loss:   · weight loss of 11.4 kg (17.87%) in < 3 months, severe   Physical Findings   Body Fat Depletion:   o mild depletion of orbitals, triceps   o Severe depletion of thoracic and lumbar region    Muscle Mass Depletion:   o severe depletion of temples, clavicle region, scapular region and interosseous muscle   Fluid Accumulation: Edema  Edema: generalized   Generalized Edema: 1+ (Trace)  o mild   Reduced  Strength:   o N/A; Weakness reported by patient.     Nutritional Diagnosis (PES Statement)   Severe malnutrition in the context of chronic illness related to suspected malabsorption due to chronic diarrhea and high ostomy ileostomy output as evidence by patient states she eats fairly well at home but cannot eat enough to prevent weight loss, high ileostomy output per I/Os, RN and hospital problem list, severe dehydration and electrolyte imbalance, severe weight loss of 11.4 kg (17.87%) in < 3 months, and physical findings of mild fat depletion of orbitals and triceps; severe fat depletion of thoracic and lumbar region; severe muscle depletion of temples, clavicles, scapular region and interosseous; and generalized trace edema noted.     Reason for Assessment  Reason For Assessment: consult(malnutrition)  Relevant Medical History: Vulvar cancer, Chronic kidney disease, stage III (moderate), DM2, fatigue, HTN, ileostomy (2/13/20), DARCI, Severe dehydration due to chronic diarrhea/high output ileostomy, electrolyte imbalance     Nutrition Risk Screen  Nutrition Risk Screen: other (see comments)(severe weight loss)       Wound 02/23/20 2000 Moisture associated dermatitis medial Perineum #1-Wound Image: Images linked  MST Score: 2  Have you recently  "lost weight without trying?: Yes: 14-23 lbs  Weight loss score: 2  Have you been eating poorly because of a decreased appetite?: No  Appetite score: 0       Nutrition/Diet History  Patient Reported Diet/Restrictions/Preferences: general  Spiritual, Cultural Beliefs, Amish Practices, Values that Affect Care: no  Food Allergies: NKFA  Factors Affecting Nutritional Intake: decreased appetite, diarrhea, altered gastrointestinal function    Anthropometrics  Temp: 97.8 °F (36.6 °C)  Height Method: Stated  Height: 5' 7" (170.2 cm)  Height (inches): 67 in  Weight Method: Bed Scale  Weight: 52.4 kg (115 lb 8.3 oz)  Weight (lb): 115.52 lb  Ideal Body Weight (IBW), Female: 135 lb  % Ideal Body Weight, Female (lb): 85.57 %  BMI (Calculated): 18.1  BMI Grade: 17 - 18.4 protein-energy malnutrition grade I  Weight Loss: unintentional  Usual Body Weight (UBW), k.8 kg  % Usual Body Weight: 82.3  % Weight Change From Usual Weight: -17.87 %       Weight History:  Wt Readings from Last 10 Encounters:   20 52.4 kg (115 lb 8.3 oz)   20 58.7 kg (129 lb 4.8 oz)   20 58.8 kg (129 lb 9.6 oz)   20 56.7 kg (125 lb)   20 56.9 kg (125 lb 6.4 oz)   20 57.2 kg (126 lb)   20 61.4 kg (135 lb 6.4 oz)   19 59 kg (130 lb)   19 61.8 kg (136 lb 4.8 oz)   19 63.8 kg (140 lb 10.5 oz)      RD interpretation of weight history: weight loss of 11.4 kg (17.87%) in < 3 months, severe (from 19 to 20)     Lab/Procedures/Meds: Pertinent Labs Reviewed  Clinical Chemistry:  Recent Labs   Lab 20  1530 20  0350    139   K 3.9 3.6   CL 79* 85*   CO2 40* 41*    96   BUN 36* 33*   CREATININE 3.9* 3.8*   CALCIUM 9.0 8.1*   PROT 6.1 4.7*   ALBUMIN 3.7 2.9*   BILITOT 1.9* 1.6*   ALKPHOS 231* 168*   AST 35 25   ALT 25 20   ANIONGAP 25* 13   ESTGFRAFRICA 12.8* 13.2*   EGFRNONAA 11.1* 11.5*   MG 1.5* 2.7*   LIPASE 41  --      CBC:   Recent Labs   Lab 20  0350   WBC " 8.67   RBC 2.53*   HGB 8.2*   HCT 26.3*      *   MCH 32.4*   MCHC 31.2*     Thyroid & Parathyroid:  Recent Labs   Lab 02/23/20  1530   TSH 7.180*   FREET4 1.16     Medications: Pertinent Medications reviewed  Scheduled Meds:   chlorhexidine  15 mL Mouth/Throat BID    dronabinol  2.5 mg Oral BID AC    levothyroxine  88 mcg Oral QAM    loperamide  4 mg Oral QID    magnesium oxide  400 mg Oral BID    meropenem (MERREM) IVPB  500 mg Intravenous Q12H    mupirocin   Nasal BID    pantoprazole  40 mg Oral Daily    potassium chloride  20 mEq Oral BID    psyllium husk (with sugar)  1 packet Oral TID    sertraline  50 mg Oral Daily     Continuous Infusions:   lactated ringers 100 mL/hr at 02/23/20 2227     PRN Meds:.acetaminophen, calcium chloride IVPB, calcium chloride IVPB, calcium chloride IVPB, dextrose 50%, dextrose 50%, glucagon (human recombinant), glucose, glucose, HYDROcodone-acetaminophen, magnesium oxide, magnesium sulfate IVPB, magnesium sulfate IVPB, magnesium sulfate IVPB, magnesium sulfate IVPB, melatonin, ondansetron, potassium chloride in water, potassium chloride in water, potassium chloride in water, potassium chloride in water, potassium chloride, potassium chloride, potassium chloride, potassium chloride, sodium chloride 0.9%, sodium phosphate IVPB, sodium phosphate IVPB, sodium phosphate IVPB, sodium phosphate IVPB, sodium phosphate IVPB, Pharmacy to dose Vancomycin consult **AND** vancomycin - pharmacy to dose    Estimated/Assessed Needs    Weight Used For Calorie Calculations: 52.4 kg (115 lb 8.3 oz)  Energy Calorie Requirements (kcal): 1834 - 2096 (35 - 40)   Energy Need Method: Kcal/kg  Protein Requirements: 77 - 105 (1.5 - 2 g/kg IBW)   Weight Used For Protein Calculations: 52.4 kg (115 lb 8.3 oz)  Fluid Requirements (mL): 1572 (30 ml/kg) or to maintain hydration   Estimated Fluid Requirement Method: other (see comments)  RDA Method (mL): 1834       Nutrition Prescription  Ordered  Current Diet Order: Regular     Evaluation of Received Nutrient/Fluid Intake  Energy Calories Required: not meeting needs  Protein Required: not meeting needs  Fluid Required: meeting needs  Tolerance: tolerating     Intake/Output Summary (Last 24 hours) at 2/24/2020 1844  Last data filed at 2/24/2020 1821  Gross per 24 hour   Intake 3045 ml   Output 3125 ml   Net -80 ml      % Intake of Estimated Energy Needs: 50 - 75 %  % Meal Intake: 50 - 75 %    Nutrition Risk  Level of Risk/Frequency of Follow-up: high     Monitor and Evaluation  Food and Nutrient Intake: energy intake, food and beverage intake  Food and Nutrient Adminstration: diet order  Physical Activity and Function: nutrition-related ADLs and IADLs, factors affecting access to physical activity  Anthropometric Measurements: weight, weight change, body mass index  Biochemical Data, Medical Tests and Procedures: electrolyte and renal panel, glucose/endocrine profile, lipid profile, gastrointestinal profile, inflammatory profile  Nutrition-Focused Physical Findings: overall appearance, extremities, muscles and bones, head and eyes, skin     Nutrition Follow-Up  RD Follow-up?: Yes    Sandra Lovell RD 02/24/2020 6:46 PM

## 2020-02-24 NOTE — CARE UPDATE
02/23/20 2026   Patient Assessment/Suction   Level of Consciousness (AVPU) alert   PRE-TX-O2   O2 Device (Oxygen Therapy) room air   SpO2 100 %   Pulse Oximetry Type Continuous   $ Pulse Oximetry - Multiple Charge Pulse Oximetry - Multiple   Pulse 69   Resp 12   Respiratory Evaluation   $ Care Plan Tech Time 15 min   Evaluation For New Orders

## 2020-02-24 NOTE — ANESTHESIA PROCEDURE NOTES
Central Line    Diagnosis: dehydration, sepsis  Patient location during procedure: ICU  Procedure start time: 2/23/2020 9:46 PM  Timeout: 2/23/2020 9:45 PM  Procedure end time: 2/23/2020 10:15 PM    Staffing  Authorizing Provider: Christiano Rowe Jr., MD  Performing Provider: Christiano Rowe Jr., MD    Staffing  Anesthesiologist: Christiano Rowe Jr., MD  Performed: anesthesiologist   Anesthesiologist was present at the time of the procedure.  Preanesthetic Checklist  Completed: patient identified, site marked, surgical consent, pre-op evaluation, timeout performed, IV checked, risks and benefits discussed, monitors and equipment checked and anesthesia consent given  Indication   Indication: vascular access, med administration     Anesthesia   local infiltration    Central Line   Skin Prep: skin prepped with ChloraPrep, skin prep agent completely dried prior to procedure  maximum sterile barriers used during central venous catheter insertion  hand hygiene performed prior to central venous catheter insertion  Location: left, internal jugular.   Catheter type: single lumen  Ultrasound: vascular probe with ultrasound  Vessel Caliber: small, patent, compressibility normal  Needle advanced into vessel with real time Ultrasound guidance.  Guidewire confirmed in vessel.  Manometry: none  Insertion Attempts: 3   Securement:line sutured, chlorhexidine patch and sterile dressing applied    Post-Procedure   X-Ray: no pneumothorax on x-ray  Adverse Events:none    Guidewire   Additional Notes  Patient was placed in Trendelenburg position attempted right IJ without success.  Unable to thread guidewire in right.  Left neck was prepped draped and localized.  Using ultrasound and sterile Seldinger technique left IJ was attempted but was unable to thread guidewire.  2-1/2 inch 18 gauge catheter was placed in the left IJ.  Sutured in place.  Aspirate and flushes easily.  Patient tolerated procedure well. No pneumothorax.

## 2020-02-24 NOTE — CONSULTS
Nephrology Consult Note        Patient Name: Shara Hutchins  MRN: [id number]    Patient Class: OP- Observation   Admission Date: 2/23/2020  Length of Stay: 0 days  Date of Service: 2/24/2020    Attending Physician: Neel Vu MD  Primary Care Provider: April Horton MD    Reason for Consult: darci/ckd3/hypovolemia/hypomagnesemia/shpt/anemia/htn    SUBJECTIVE:     HPI: 69F with HTN, DM2, hypothyroidism, secondary hyperparathyroidism, anemia, vulvar Ca (in remission), CKD3 presentswith abdominal pain, lightheadedness, vaginal bleeding. Had an ileostomy on 2/13, d/c on 2/17 (Dr. Meza) for chronic, debilitating diarrhea ? Pneumatosis intestinalis. Since then, she lost 20 lbs, has intermittent nausea and vomiting that is non-bloody and non-bilious, has high ostomy output - seeing whole pills at times. Pain to her abdominal incision site is worsening, had some lightheadedness when standing. Renal consulted for DARCI.    Past Medical History:   Diagnosis Date    Chronic kidney disease, stage III (moderate) 1/7/2018    Diabetes mellitus, type 2     Fatigue     Hypertension     Iron deficiency anemia due to chronic blood loss 1/7/2018    Vulvar cancer 1/7/2019     Past Surgical History:   Procedure Laterality Date    ABDOMINAL SURGERY      BACK SURGERY      CERVICAL FUSION      x 4    cervix repair      CHOLECYSTECTOMY  2000    DIAGNOSTIC LAPAROSCOPY N/A 2/13/2020    Procedure: LAPAROSCOPY, DIAGNOSTIC;  Surgeon: Robbi Lovell III, MD;  Location: [facility];  Service: General;  Laterality: N/A;    HYSTERECTOMY  1980    ILEOSTOMY N/A 2/13/2020    Procedure: CREATION, ILEOSTOMY Loop;  Surgeon: Robbi Lovell III, MD;  Location: [facility] OR;  Service: General;  Laterality: N/A;    LUMBAR LAMINECTOMY      LYSIS OF ADHESIONS N/A 2/13/2020    Procedure: LYSIS, ADHESIONS;  Surgeon: Robbi Lovell III, MD;  Location: [facility] OR;  Service: General;  Laterality: N/A;    NECK SURGERY      REMOVAL OF VASCULAR ACCESS  PORT Right 2020    Procedure: REMOVAL, VASCULAR ACCESS PORT;  Surgeon: Robbi Lovell III, MD;  [facility];  Service: General;  Laterality: Right;    SHOULDER SURGERY      vulva cancer       Family History   Problem Relation Age of Onset    Heart disease Mother     Hypertension Mother     Cancer Mother         lung    Heart disease Father     Heart disease Sister         Open heart surgery    No Known Problems Daughter      Social History     Tobacco Use    Smoking status: Former Smoker     Packs/day: 1.00     Years: 33.00     Pack years: 33.00     Types: Cigarettes     Last attempt to quit: 2000     Years since quittin.6    Smokeless tobacco: Never Used   Substance Use Topics    Alcohol use: No    Drug use: No       Review of patient's allergies indicates:   Allergen Reactions    Ciprofloxacin Other (See Comments)     Elevated liver enzymes      Pcn [penicillins] Anaphylaxis       Outpatient meds:  No current facility-administered medications on file prior to encounter.      Current Outpatient Medications on File Prior to Encounter   Medication Sig Dispense Refill    calcitRIOL (ROCALTROL) 0.5 MCG Cap Take 1 capsule by mouth once daily.  3    dicyclomine (BENTYL) 10 MG capsule TAKE 1 CAPSULE THREE TIMES A DAY (Patient taking differently: Take 10 mg by mouth 3 (three) times daily. ) 270 capsule 4    diphenhydrAMINE (BENADRYL) 25 mg capsule Take 25 mg by mouth nightly as needed for Itching.      dronabinol (MARINOL) 2.5 MG capsule Take 1 capsule (2.5 mg total) by mouth 2 (two) times daily before meals. 60 capsule 0    HYDROcodone-acetaminophen (NORCO)  mg per tablet Take 1 tablet by mouth every 6 (six) hours as needed for Pain. 30 tablet 0    levothyroxine (SYNTHROID) 88 MCG tablet TAKE 1 TABLET DAILY (Patient taking differently: Take 88 mcg by mouth every morning. ) 90 tablet 4    magnesium oxide (MAG-OX) 400 mg (241.3 mg magnesium) tablet Take 400 mg by mouth 2  (two) times daily.      potassium chloride (KLOR-CON) 10 MEQ TbSR Take 20 mEq by mouth 2 (two) times daily.       promethazine (PHENERGAN) 25 MG tablet Take 1 tablet (25 mg total) by mouth daily as needed for Nausea. 90 tablet 0    sertraline (ZOLOFT) 50 MG tablet Take 50 mg by mouth once daily.       sodium bicarbonate 650 MG tablet Take 650 mg by mouth 3 (three) times daily.   2       Scheduled meds:   dronabinol  2.5 mg Oral BID AC    levothyroxine  88 mcg Oral QAM    loperamide  4 mg Oral QID    magnesium oxide  400 mg Oral BID    meropenem (MERREM) IVPB  500 mg Intravenous Q12H    pantoprazole  40 mg Oral Daily    potassium chloride  20 mEq Oral BID    sertraline  50 mg Oral Daily       Infusions:   lactated ringers 100 mL/hr at 02/23/20 2227       PRN meds:  acetaminophen, dextrose 50%, dextrose 50%, glucagon (human recombinant), glucose, glucose, HYDROcodone-acetaminophen, melatonin, ondansetron, sodium chloride 0.9%, Pharmacy to dose Vancomycin consult **AND** vancomycin - pharmacy to dose    Review of Systems:  Review of Systems   Constitutional: Positive for malaise/fatigue and weight loss. Negative for chills and fever.   HENT: Negative for hearing loss and nosebleeds.    Eyes: Negative for blurred vision, double vision and photophobia.   Respiratory: Negative for cough, shortness of breath and wheezing.    Cardiovascular: Negative for chest pain, palpitations and leg swelling.   Gastrointestinal: Positive for abdominal pain and nausea. Negative for constipation, diarrhea, heartburn and vomiting.   Genitourinary: Negative for dysuria, frequency and urgency.   Musculoskeletal: Negative for falls, joint pain and myalgias.   Skin: Negative for itching and rash.   Neurological: Negative for dizziness, speech change, focal weakness, loss of consciousness and headaches.   Endo/Heme/Allergies: Does not bruise/bleed easily.   Psychiatric/Behavioral: Negative for depression and substance abuse. The  patient is not nervous/anxious.        OBJECTIVE:     Vital Signs and IO (Last 24H):  Temp:  [97.4 °F (36.3 °C)-99.4 °F (37.4 °C)]   Pulse:  [58-83]   Resp:  [11-62]   BP: ()/(50-84)   SpO2:  [69 %-100 %]   I/O last 3 completed shifts:  In: 1695 [I.V.:595; IV Piggyback:1100]  Out: 475 [Urine:175; Stool:300]    Wt Readings from Last 5 Encounters:   02/24/20 52.4 kg (115 lb 8.3 oz)   02/19/20 58.7 kg (129 lb 4.8 oz)   02/19/20 58.8 kg (129 lb 9.6 oz)   02/14/20 56.7 kg (125 lb)   02/05/20 56.9 kg (125 lb 6.4 oz)         Physical Exam:  Physical Exam   Constitutional: She is oriented to person, place, and time. She appears well-developed and well-nourished.   HENT:   Head: Normocephalic and atraumatic.   Mouth/Throat: Oropharynx is clear and moist.   Eyes: Pupils are equal, round, and reactive to light. EOM are normal. No scleral icterus.   Neck: Neck supple.   Cardiovascular: Normal rate and regular rhythm.   Pulmonary/Chest: Effort normal. No stridor. No respiratory distress.   Abdominal: Soft. She exhibits no distension.   Musculoskeletal: Normal range of motion. She exhibits no edema or deformity.   Neurological: She is alert and oriented to person, place, and time. No cranial nerve deficit.   Skin: Skin is warm and dry. No rash noted. She is not diaphoretic. No erythema.   Psychiatric: She has a normal mood and affect. Her behavior is normal.       Body mass index is 18.09 kg/m².    Laboratory:  Recent Labs   Lab 02/23/20  1530 02/24/20  0350    139   K 3.9 3.6   CL 79* 85*   CO2 40* 41*   BUN 36* 33*   CREATININE 3.9* 3.8*   ESTGFRAFRICA 12.8* 13.2*   EGFRNONAA 11.1* 11.5*    96       Recent Labs   Lab 02/23/20  1530 02/24/20  0350   CALCIUM 9.0 8.1*   ALBUMIN 3.7 2.9*   MG 1.5* 2.7*       Recent Labs   Lab 09/30/19  0820 11/15/19  0748   PTH, Intact 176.4 H 238.4 H       No results for input(s): POCTGLUCOSE in the last 168 hours.    Recent Labs   Lab 07/11/19  0806 11/15/19  0748  01/22/20  0848   Hemoglobin A1C 5.1 Invalid A SEE COMMENT       Recent Labs   Lab 02/23/20  1530 02/24/20  0350   WBC 6.26 8.67   HGB 10.2* 8.2*   HCT 33.8* 26.3*    198   * 104*   MCHC 30.2* 31.2*   MONO 6.2  0.4 7.0  0.6       Recent Labs   Lab 02/23/20  1530 02/24/20  0350   BILITOT 1.9* 1.6*   PROT 6.1 4.7*   ALBUMIN 3.7 2.9*   ALKPHOS 231* 168*   ALT 25 20   AST 35 25       Recent Labs   Lab 02/10/20  2119 02/23/20  1715   Color, UA Yellow Yellow   Appearance, UA Hazy A Clear   pH, UA 6.0 >8.0 A   Specific Jerome, UA 1.010 1.010   Protein, UA Trace A 1+ A   Glucose, UA Negative Negative   Ketones, UA Negative Negative   Urobilinogen, UA Negative Negative   Bilirubin (UA) Negative Negative   Occult Blood UA 2+ A Negative   Nitrite, UA Negative Negative   RBC, UA 5 H 4   WBC, UA 38 H 19 H   Bacteria Negative Negative   Hyaline Casts, UA 29 A 19 A             Microbiology Results (last 7 days)     Procedure Component Value Units Date/Time    Urine culture [[id number]] Collected:  02/23/20 1715    Order Status:  Completed Specimen:  Urine Updated:  02/24/20 0655     Urine Culture, Routine No growth to date    Narrative:       Preferred Collection Type->Urine, Clean Catch  Specimen Source->Urine    Clostridium difficile EIA [[id number]] Collected:  02/23/20 2124    Order Status:  Completed Specimen:  Stool Updated:  02/23/20 2329     C. diff Antigen Negative     C difficile Toxins A+B, EIA Negative     Comment: Testing not recommended for children <24 months old.       Narrative:       Recoll. [id number] by CD3 at 02/23/2020 20:57, reason: Container not   labeled    Blood culture #1 **CANNOT BE ORDERED STAT** [[id number]] Collected:  02/23/20 1530    Order Status:  Completed Specimen:  Blood from Peripheral, Upper Arm, Right Updated:  02/23/20 2158     Blood Culture, Routine No Growth to date    Blood culture #2 **CANNOT BE ORDERED STAT** [[id number]] Collected:  02/23/20 1545    Order Status:   Completed Specimen:  Blood from Peripheral, Upper Arm, Right Updated:  02/23/20 0618     Blood Culture, Routine No Growth to date          ASSESSMENT/PLAN:     Active Hospital Problems    Diagnosis  POA    DARCI (acute kidney injury) [N17.9]  Yes      Resolved Hospital Problems   No resolved problems to display.     DARCI due to volume depletion from high ostomy output  CKD stage 3  Hypomagnesemia  Acidosis  No NSAIDs, ACEI/ARB, IV contrast or other nephrotoxins.  Keep MAP > 60, SBP > 100.  Dose meds for GFR < 30 ml/min.  Renal diet - low K, low phos.  Agree with IVF, replete electrolytes as needed.  GI/surgey consult for management of high ostomy output.    Anemia of CKD  Hgb and HCT are acceptable. Monitor.  Will provide ADAM and/or IV iron PRN.    MBD / Secondary HPT  PTH is high. Continue calcitriol.    HTN, now hypotension  BP seem controlled.   Tolerate asymptomatic HTN up to -160.  Hold home meds.    Thank you for allowing us to participate in the care of your patient!   We will follow the patient and provide recommendations as needed.    Jorge Webb MD    [facility]  [address] [place] [address]  [place] [address]    [phone] - tel  [phone] - fax    2/24/2020 10:50 AM

## 2020-02-25 LAB
ANION GAP SERPL CALC-SCNC: 13 MMOL/L (ref 8–16)
BASOPHILS # BLD AUTO: 0.03 K/UL (ref 0–0.2)
BASOPHILS NFR BLD: 0.5 % (ref 0–1.9)
BUN SERPL-MCNC: 37 MG/DL (ref 8–23)
CALCIUM SERPL-MCNC: 8.4 MG/DL (ref 8.7–10.5)
CHLORIDE SERPL-SCNC: 87 MMOL/L (ref 95–110)
CO2 SERPL-SCNC: 41 MMOL/L (ref 23–29)
CREAT SERPL-MCNC: 4.5 MG/DL (ref 0.5–1.4)
DIFFERENTIAL METHOD: ABNORMAL
EOSINOPHIL # BLD AUTO: 0.1 K/UL (ref 0–0.5)
EOSINOPHIL NFR BLD: 1.2 % (ref 0–8)
ERYTHROCYTE [DISTWIDTH] IN BLOOD BY AUTOMATED COUNT: 14.6 % (ref 11.5–14.5)
EST. GFR  (AFRICAN AMERICAN): 10.8 ML/MIN/1.73 M^2
EST. GFR  (NON AFRICAN AMERICAN): 9.4 ML/MIN/1.73 M^2
GLUCOSE SERPL-MCNC: 78 MG/DL (ref 70–110)
HCT VFR BLD AUTO: 26.2 % (ref 37–48.5)
HGB BLD-MCNC: 8.1 G/DL (ref 12–16)
IMM GRANULOCYTES # BLD AUTO: 0.03 K/UL (ref 0–0.04)
IMM GRANULOCYTES NFR BLD AUTO: 0.5 % (ref 0–0.5)
LYMPHOCYTES # BLD AUTO: 1.1 K/UL (ref 1–4.8)
LYMPHOCYTES NFR BLD: 17.2 % (ref 18–48)
MAGNESIUM SERPL-MCNC: 2.4 MG/DL (ref 1.6–2.6)
MCH RBC QN AUTO: 32.1 PG (ref 27–31)
MCHC RBC AUTO-ENTMCNC: 30.9 G/DL (ref 32–36)
MCV RBC AUTO: 104 FL (ref 82–98)
MONOCYTES # BLD AUTO: 0.4 K/UL (ref 0.3–1)
MONOCYTES NFR BLD: 6.3 % (ref 4–15)
NEUTROPHILS # BLD AUTO: 4.9 K/UL (ref 1.8–7.7)
NEUTROPHILS NFR BLD: 74.3 % (ref 38–73)
NRBC BLD-RTO: 0 /100 WBC
PLATELET # BLD AUTO: 175 K/UL (ref 150–350)
PMV BLD AUTO: 10.9 FL (ref 9.2–12.9)
POTASSIUM SERPL-SCNC: 3.3 MMOL/L (ref 3.5–5.1)
RBC # BLD AUTO: 2.52 M/UL (ref 4–5.4)
SODIUM SERPL-SCNC: 141 MMOL/L (ref 136–145)
WBC # BLD AUTO: 6.62 K/UL (ref 3.9–12.7)

## 2020-02-25 PROCEDURE — 63600175 PHARM REV CODE 636 W HCPCS: Performed by: INTERNAL MEDICINE

## 2020-02-25 PROCEDURE — 25000003 PHARM REV CODE 250: Performed by: INTERNAL MEDICINE

## 2020-02-25 PROCEDURE — 97110 THERAPEUTIC EXERCISES: CPT | Mod: CQ

## 2020-02-25 PROCEDURE — 97535 SELF CARE MNGMENT TRAINING: CPT

## 2020-02-25 PROCEDURE — 25000003 PHARM REV CODE 250: Performed by: SURGERY

## 2020-02-25 PROCEDURE — 36415 COLL VENOUS BLD VENIPUNCTURE: CPT

## 2020-02-25 PROCEDURE — 83735 ASSAY OF MAGNESIUM: CPT

## 2020-02-25 PROCEDURE — 21400001 HC TELEMETRY ROOM

## 2020-02-25 PROCEDURE — 97165 OT EVAL LOW COMPLEX 30 MIN: CPT

## 2020-02-25 PROCEDURE — 80048 BASIC METABOLIC PNL TOTAL CA: CPT

## 2020-02-25 PROCEDURE — 25000003 PHARM REV CODE 250

## 2020-02-25 PROCEDURE — 85025 COMPLETE CBC W/AUTO DIFF WBC: CPT

## 2020-02-25 PROCEDURE — 94761 N-INVAS EAR/PLS OXIMETRY MLT: CPT

## 2020-02-25 RX ORDER — MORPHINE SULFATE 2 MG/ML
2 INJECTION, SOLUTION INTRAMUSCULAR; INTRAVENOUS EVERY 4 HOURS PRN
Status: DISCONTINUED | OUTPATIENT
Start: 2020-02-25 | End: 2020-03-06 | Stop reason: HOSPADM

## 2020-02-25 RX ORDER — POTASSIUM CHLORIDE 20 MEQ/15ML
40 SOLUTION ORAL 3 TIMES DAILY
Status: DISCONTINUED | OUTPATIENT
Start: 2020-02-25 | End: 2020-02-26

## 2020-02-25 RX ADMIN — SERTRALINE HYDROCHLORIDE 50 MG: 50 TABLET ORAL at 09:02

## 2020-02-25 RX ADMIN — LOPERAMIDE HYDROCHLORIDE 4 MG: 2 CAPSULE ORAL at 09:02

## 2020-02-25 RX ADMIN — PSYLLIUM HUSK 1 PACKET: 3.4 POWDER ORAL at 09:02

## 2020-02-25 RX ADMIN — CHLORHEXIDINE GLUCONATE 15 ML: 1.2 RINSE ORAL at 09:02

## 2020-02-25 RX ADMIN — LEVOTHYROXINE SODIUM 88 MCG: 88 TABLET ORAL at 05:02

## 2020-02-25 RX ADMIN — MUPIROCIN: 20 OINTMENT TOPICAL at 09:02

## 2020-02-25 RX ADMIN — DRONABINOL 2.5 MG: 2.5 CAPSULE ORAL at 04:02

## 2020-02-25 RX ADMIN — POTASSIUM CHLORIDE 40 MEQ: 20 SOLUTION ORAL at 03:02

## 2020-02-25 RX ADMIN — MORPHINE SULFATE 2 MG: 2 INJECTION, SOLUTION INTRAMUSCULAR; INTRAVENOUS at 04:02

## 2020-02-25 RX ADMIN — MAGNESIUM OXIDE 400 MG: 400 TABLET ORAL at 09:02

## 2020-02-25 RX ADMIN — LOPERAMIDE HYDROCHLORIDE 4 MG: 2 CAPSULE ORAL at 04:02

## 2020-02-25 RX ADMIN — PANTOPRAZOLE SODIUM 40 MG: 40 TABLET, DELAYED RELEASE ORAL at 05:02

## 2020-02-25 RX ADMIN — HYDROCODONE BITARTRATE AND ACETAMINOPHEN 1 TABLET: 5; 325 TABLET ORAL at 09:02

## 2020-02-25 RX ADMIN — PSYLLIUM HUSK 1 PACKET: 3.4 POWDER ORAL at 03:02

## 2020-02-25 RX ADMIN — POTASSIUM CHLORIDE 40 MEQ: 20 SOLUTION ORAL at 09:02

## 2020-02-25 RX ADMIN — SODIUM CHLORIDE, POTASSIUM CHLORIDE, SODIUM LACTATE AND CALCIUM CHLORIDE: 600; 310; 30; 20 INJECTION, SOLUTION INTRAVENOUS at 03:02

## 2020-02-25 RX ADMIN — SODIUM CHLORIDE, POTASSIUM CHLORIDE, SODIUM LACTATE AND CALCIUM CHLORIDE: 600; 310; 30; 20 INJECTION, SOLUTION INTRAVENOUS at 11:02

## 2020-02-25 RX ADMIN — LOPERAMIDE HYDROCHLORIDE 4 MG: 2 CAPSULE ORAL at 12:02

## 2020-02-25 RX ADMIN — POTASSIUM CHLORIDE 20 MEQ: 20 TABLET, EXTENDED RELEASE ORAL at 09:02

## 2020-02-25 RX ADMIN — POTASSIUM CHLORIDE 40 MEQ: 20 SOLUTION ORAL at 12:02

## 2020-02-25 RX ADMIN — MORPHINE SULFATE 2 MG: 2 INJECTION, SOLUTION INTRAMUSCULAR; INTRAVENOUS at 09:02

## 2020-02-25 RX ADMIN — DRONABINOL 2.5 MG: 2.5 CAPSULE ORAL at 05:02

## 2020-02-25 RX ADMIN — MEROPENEM 500 MG: 500 INJECTION, POWDER, FOR SOLUTION INTRAVENOUS at 05:02

## 2020-02-25 RX ADMIN — VANCOMYCIN HYDROCHLORIDE 750 MG: 1 INJECTION, POWDER, LYOPHILIZED, FOR SOLUTION INTRAVENOUS at 03:02

## 2020-02-25 NOTE — NURSING TRANSFER
Nursing Transfer Note      2/25/2020     Transfer 2502    Transfer via wc    Transfer with monitor and pt belongings     Transported by tech    Medicines sent: yes    Chart send with patient: yes    Notified: Lidia CAREY    Patient reassessed at: 1400    Upon arrival to floor: Receiving nurse notified. Pt transferred to room 2502. GERMANIA.

## 2020-02-25 NOTE — PT/OT/SLP EVAL
"Occupational Therapy   Evaluation    Name: Shara Hutchins  MRN: [id number]  Admitting Diagnosis:  Severe dehydration      Recommendations:     Discharge Recommendations: home, home with home health  Discharge Equipment Recommendations:  none  Barriers to discharge:  None    Assessment:     Shara Hutchins is a 69 y.o. female with a medical diagnosis of Severe dehydration.  She sleeping up in bed after eating lunch upon arrival to pt's room with pt agreeing to OT session. Performance deficits affecting function: weakness, impaired endurance, impaired functional mobilty, impaired cardiopulmonary response to activity, pain, impaired skin.      Rehab Prognosis: Good; patient would benefit from acute skilled OT services to address these deficits and reach maximum level of function.       Plan:     Patient to be seen 3 x/week to address the above listed problems via self-care/home management, therapeutic activities, therapeutic exercises  · Plan of Care Expires: 03/25/20  · Plan of Care Reviewed with: patient    Subjective     Chief Complaint: "they discharged me way to soon last time";  "I can use it; I'm quite weak"; pt wanting cont OT for UE exercise/conditioning; OT agrees  Patient/Family Comments/goals: "understand how to take care of myself with this new situation better"    Occupational Profile:  Living Environment: one story house with one step to enter, tub/shower combo with shower chair, walk in shower, standard toilet  Previous level of function: independent with ambulation, cousin assists with shower, independent with dressing, and ostomy care  Roles and Routines: pt lives with cousin   Equipment Used at Home:  shower chair  Assistance upon Discharge: same as PTA from her cousin    Pain/Comfort:  · Pain Rating 1: 7/10  · Location 1: abdomen  · Pain Addressed 1: Distraction  · Pain Rating Post-Intervention 1: 7/10    Patients cultural, spiritual, Taoism conflicts given the current situation: no    Objective: "     Communicated with: Nursing prior to session.  Patient found HOB elevated w/ NC donned and O2 off however, pt with 99% O2 sats with bed alarm, telemetry, blood pressure cuff, PICC line, peripheral IV, colostomy, lee catheter(NC donned, no O2 ) upon OT entry to room.    General Precautions: Standard, fall   Orthopedic Precautions:N/A   Braces: N/A     Occupational Performance:    Bed Mobility:    · Patient completed Rolling/Turning to Right with supervision  · Patient completed Scooting/Bridging with supervision  · Patient completed Supine to Sit with stand by assistance and due to extensive medical lines     Functional Mobility/Transfers:  · Patient completed Sit <> Stand Transfer with stand by assistance  with  no assistive device   · Patient completed Bed <> Chair Transfer using Step Transfer technique with stand by assistance with no assistive device    Activities of Daily Living:  · Feeding:  modified independence and dentures    · Grooming: set up for face, hands and hair, however, SBA needed to rinse dentures at the sink then pt applies dentures    · Lower Body Dressing: stand by assistance and doffed personal socks and donned anti-skid socks reclined in unsupported position in bed with bringing her feet to her hands     · Toileting: NT at this time; level of ostomy care TBA       Cognitive/Visual Perceptual:  Cognitive/Psychosocial Skills:     -       Oriented to: Person, Place, Time and Situation   -       Follows Commands/attention:Follows two-step commands  -       Communication: clear/fluent  -       Memory: No Deficits noted  -       Safety awareness/insight to disability: intact   -       Mood/Affect/Coping skills/emotional control: Appropriate to situation, Cooperative and Pleasant  Visual/Perceptual:      -wears glasses     Physical Exam:  Balance: -       good dynamic sitting and dynamic standing   Postural examination/scapula alignment:    -       No postural abnormalities identified  Skin  integrity: Bruising of B UE  Motor Planning: -       intact   Dominant hand: -       right   Upper Extremity Range of Motion:     -       Right Upper Extremity: WFL  -       Left Upper Extremity: WFL  Upper Extremity Strength:    -       Right Upper Extremity: WFL  -       Left Upper Extremity: WFL   Strength:    -       Right Upper Extremity: WFL  -       Left Upper Extremity: WFL  Fine Motor Coordination:    -       Intact  Gross motor coordination:   WFL    AMPAC 6 Click ADL:  AMPAC Total Score: 19    Treatment & Education: role of OT; ADL training w/ tx plan of pt to feel comfortable with her overall medical care for pt to become compliant and comfortable with her recovery  Education:    Patient left up in chair with all lines intact, call button in reach and nurse notified    GOALS:   Multidisciplinary Problems     Occupational Therapy Goals        Problem: Occupational Therapy Goal    Goal Priority Disciplines Outcome Interventions   Occupational Therapy Goal     OT, PT/OT     Description:  Goals to be met by: discharge    Patient will increase functional independence with ADLs by performing:    Pt to be independent with UE HEP program of 3 x 10 reps each for reconditioning  UE Dressing with Supervision.  LE Dressing with Supervision.  Grooming while standing with Modified [employer].  Toileting from toilet with Modified [employer] for hygiene and clothing management.   Toilet transfer to toilet with Supervision.                       History:     Past Medical History:   Diagnosis Date    Chronic kidney disease, stage III (moderate) 1/7/2018    Diabetes mellitus, type 2     Fatigue     Hypertension     Iron deficiency anemia due to chronic blood loss 1/7/2018    Vulvar cancer 1/7/2019       Past Surgical History:   Procedure Laterality Date    ABDOMINAL SURGERY      BACK SURGERY      CERVICAL FUSION      x 4    cervix repair      CHOLECYSTECTOMY  2000    DIAGNOSTIC LAPAROSCOPY N/A 2/13/2020     Procedure: LAPAROSCOPY, DIAGNOSTIC;  Surgeon: Robbi Lovell III, MD;  Location: [facility];  Service: General;  Laterality: N/A;    HYSTERECTOMY  1980    ILEOSTOMY N/A 2/13/2020    Procedure: CREATION, ILEOSTOMY Loop;  Surgeon: Robbi Lovell III, MD;  Location: [facility];  Service: General;  Laterality: N/A;    LUMBAR LAMINECTOMY      LYSIS OF ADHESIONS N/A 2/13/2020    Procedure: LYSIS, ADHESIONS;  Surgeon: Robbi Lovell III, MD;  Location: [facility];  Service: General;  Laterality: N/A;    NECK SURGERY      REMOVAL OF VASCULAR ACCESS PORT Right 2/13/2020    Procedure: REMOVAL, VASCULAR ACCESS PORT;  Surgeon: Robbi Lovell III, MD;  Location: [facility];  Service: General;  Laterality: Right;    SHOULDER SURGERY      vulva cancer         Time Tracking:     OT Date of Treatment: 02/25/20  OT Start Time: 1250  OT Stop Time: 1310  OT Total Time (min): 20 min    Billable Minutes:Evaluation 12  Self Care/Home Management 8    Antonietta Ricardo OT  2/25/20202:32 PM

## 2020-02-25 NOTE — PT/OT/SLP PROGRESS
Physical Therapy Treatment    Patient Name:  Shara Hutchins   MRN:  [id number]    Recommendations:     Discharge Recommendations:  home health PT   Discharge Equipment Recommendations: walker, rolling   Barriers to discharge: None    Assessment:     Shara Hutchins is a 69 y.o. female admitted with a medical diagnosis of Severe dehydration.  She presents with the following impairments/functional limitations:  weakness, impaired endurance, impaired self care skills, impaired functional mobilty, impaired cardiopulmonary response to activity, gait instability, decreased safety awareness, pain, impaired skin . Patient in too much pain for gait today but tolerated supine exercise.    Rehab Prognosis: Good; patient would benefit from acute skilled PT services to address these deficits and reach maximum level of function.    Recent Surgery: * No surgery found *      Plan:     During this hospitalization, patient to be seen 6 x/week to address the identified rehab impairments via gait training, therapeutic activities, therapeutic exercises and progress toward the following goals:    · Plan of Care Expires:  03/24/20    Subjective     Chief Complaint: pain  Patient/Family Comments/goals: for improvement and less pain  Pain/Comfort:  · Pain Rating 1: 7/10  · Location 1: abdomen  · Pain Addressed 1: Pre-medicate for activity  · Pain Rating Post-Intervention 1: 5/10  · Pain Rating Post-Intervention 2: 4/10      Objective:     Communicated with CHERRY Richards prior to session.  Patient found HOB elevated with bed alarm, telemetry, blood pressure cuff, PICC line, colostomy, lee catheter, oxygen upon PT entry to room.     General Precautions: Standard, fall   Orthopedic Precautions:N/A   Braces: N/A     Functional Mobility:  · Bed Mobility:     · Scooting: stand by assistance      AM-PAC 6 CLICK MOBILITY          Therapeutic Activities and Exercises:   Supine exercises to increase lower extremity strength to decrease fall risk for return to  PLOF:  Ankle pumps, ankle circles cs/ccw, Heel slides, Hip Ab/ADD, Qs, Gs, hamstring sets, Bridges x 10. Scooting to HOB with BUE and bridging.    Patient left HOB elevated with all lines intact, call button in reach, bed alarm on and RN present..    GOALS:   Multidisciplinary Problems     Physical Therapy Goals        Problem: Physical Therapy Goal    Goal Priority Disciplines Outcome Goal Variances Interventions   Physical Therapy Goal     PT, PT/OT Ongoing, Progressing     Description:  Goals to be met by: D/C     Patient will increase functional independence with mobility by performin. Supine to sit with Contact Guard Assistance  2. Sit to stand transfer with Contact Guard Assistance  3. Bed to chair transfer with Contact Guard Assistance using Rolling Walker  4. Gait  x 100 feet with Contact Guard Assistance using Rolling Walker.                       Time Tracking:     PT Received On: 20  PT Start Time: 919     PT Stop Time: 930  PT Total Time (min): 11 min     Billable Minutes: Therapeutic Exercise 11    Treatment Type: Treatment  PT/PTA: PTA     PTA Visit Number: 0     Gaby Rivera PTA  2020

## 2020-02-25 NOTE — NURSING
Received pt from CHERRY Richards. Pt brought down via w/c. Pt resting in bed, ileostomy reinforced. Pt verbalized no needs at this time. Call light in reach, brakes locked, side rails up x2.

## 2020-02-25 NOTE — CARE UPDATE
02/24/20 0055   Patient Assessment/Suction   Level of Consciousness (AVPU) alert   PRE-TX-O2   O2 Device (Oxygen Therapy) room air   SpO2 98 %   Pulse Oximetry Type Continuous   $ Pulse Oximetry - Multiple Charge Pulse Oximetry - Multiple   Pulse 63   Resp 10   Respiratory Evaluation   $ Care Plan Tech Time 15 min   Evaluation For Re-Eval 3 day

## 2020-02-25 NOTE — PLAN OF CARE
Problem: Physical Therapy Goal  Goal: Physical Therapy Goal  Description  Goals to be met by: D/C     Patient will increase functional independence with mobility by performin. Supine to sit with Contact Guard Assistance  2. Sit to stand transfer with Contact Guard Assistance  3. Bed to chair transfer with Contact Guard Assistance using Rolling Walker  4. Gait  x 100 feet with Contact Guard Assistance using Rolling Walker.      Outcome: Ongoing, Progressing

## 2020-02-25 NOTE — CONSULTS
Nephrology Consult Note        Patient Name: Shara Hutchins  MRN: [id number]    Patient Class: IP- Inpatient   Admission Date: 2/23/2020  Length of Stay: 0 days  Date of Service: 2/25/2020    Attending Physician: Aleksander Phan MD  Primary Care Provider: April Horton MD    Reason for Consult: darci/ckd3/hypovolemia/hypomagnesemia/shpt/anemia/htn    SUBJECTIVE:     HPI: 69F with HTN, DM2, hypothyroidism, secondary hyperparathyroidism, anemia, vulvar Ca (in remission), CKD3 presentswith abdominal pain, lightheadedness, vaginal bleeding. Had an ileostomy on 2/13, d/c on 2/17 (Dr. Meza) for chronic, debilitating diarrhea ? Pneumatosis intestinalis. Since then, she lost 20 lbs, has intermittent nausea and vomiting that is non-bloody and non-bilious, has high ostomy output - seeing whole pills at times. Pain to her abdominal incision site is worsening, had some lightheadedness when standing. Renal consulted for DARCI.    2/25 VSS, no new complains.    Past Medical History:   Diagnosis Date    Chronic kidney disease, stage III (moderate) 1/7/2018    Diabetes mellitus, type 2     Fatigue     Hypertension     Iron deficiency anemia due to chronic blood loss 1/7/2018    Vulvar cancer 1/7/2019     Past Surgical History:   Procedure Laterality Date    ABDOMINAL SURGERY      BACK SURGERY      CERVICAL FUSION      x 4    cervix repair      CHOLECYSTECTOMY  2000    DIAGNOSTIC LAPAROSCOPY N/A 2/13/2020    Procedure: LAPAROSCOPY, DIAGNOSTIC;  Surgeon: Robbi Lovell III, MD;  Location: [facility] OR;  Service: General;  Laterality: N/A;    HYSTERECTOMY  1980    ILEOSTOMY N/A 2/13/2020    Procedure: CREATION, ILEOSTOMY Loop;  Surgeon: Robbi Lovell III, MD;  Location: [facility] OR;  Service: General;  Laterality: N/A;    LUMBAR LAMINECTOMY      LYSIS OF ADHESIONS N/A 2/13/2020    Procedure: LYSIS, ADHESIONS;  Surgeon: Robbi Lovell III, MD;  Location: [facility] OR;  Service: General;  Laterality: N/A;    NECK SURGERY       REMOVAL OF VASCULAR ACCESS PORT Right 2020    Procedure: REMOVAL, VASCULAR ACCESS PORT;  Surgeon: Robbi Lovell III, MD;  Location: [facility];  Service: General;  Laterality: Right;    SHOULDER SURGERY      vulva cancer       Family History   Problem Relation Age of Onset    Heart disease Mother     Hypertension Mother     Cancer Mother         lung    Heart disease Father     Heart disease Sister         Open heart surgery    No Known Problems Daughter      Social History     Tobacco Use    Smoking status: Former Smoker     Packs/day: 1.00     Years: 33.00     Pack years: 33.00     Types: Cigarettes     Last attempt to quit: 2000     Years since quittin.6    Smokeless tobacco: Never Used   Substance Use Topics    Alcohol use: No    Drug use: No       Review of patient's allergies indicates:   Allergen Reactions    Ciprofloxacin Other (See Comments)     Elevated liver enzymes      Pcn [penicillins] Anaphylaxis       Outpatient meds:  No current facility-administered medications on file prior to encounter.      Current Outpatient Medications on File Prior to Encounter   Medication Sig Dispense Refill    calcitRIOL (ROCALTROL) 0.5 MCG Cap Take 1 capsule by mouth once daily.  3    dicyclomine (BENTYL) 10 MG capsule TAKE 1 CAPSULE THREE TIMES A DAY (Patient taking differently: Take 10 mg by mouth 3 (three) times daily. ) 270 capsule 4    diphenhydrAMINE (BENADRYL) 25 mg capsule Take 25 mg by mouth nightly as needed for Itching.      dronabinol (MARINOL) 2.5 MG capsule Take 1 capsule (2.5 mg total) by mouth 2 (two) times daily before meals. 60 capsule 0    HYDROcodone-acetaminophen (NORCO)  mg per tablet Take 1 tablet by mouth every 6 (six) hours as needed for Pain. 30 tablet 0    levothyroxine (SYNTHROID) 88 MCG tablet TAKE 1 TABLET DAILY (Patient taking differently: Take 88 mcg by mouth every morning. ) 90 tablet 4    magnesium oxide (MAG-OX) 400 mg (241.3 mg magnesium)  tablet Take 400 mg by mouth 2 (two) times daily.      potassium chloride (KLOR-CON) 10 MEQ TbSR Take 20 mEq by mouth 2 (two) times daily.       promethazine (PHENERGAN) 25 MG tablet Take 1 tablet (25 mg total) by mouth daily as needed for Nausea. 90 tablet 0    sertraline (ZOLOFT) 50 MG tablet Take 50 mg by mouth once daily.       sodium bicarbonate 650 MG tablet Take 650 mg by mouth 3 (three) times daily.   2       Scheduled meds:   chlorhexidine  15 mL Mouth/Throat BID    dronabinol  2.5 mg Oral BID AC    levothyroxine  88 mcg Oral QAM    loperamide  4 mg Oral QID    magnesium oxide  400 mg Oral BID    meropenem (MERREM) IVPB  500 mg Intravenous Q12H    mupirocin   Nasal BID    pantoprazole  40 mg Oral Daily    potassium chloride  20 mEq Oral BID    psyllium husk (with sugar)  1 packet Oral TID    sertraline  50 mg Oral Daily       Infusions:   lactated ringers 125 mL/hr at 02/25/20 0840       PRN meds:  acetaminophen, calcium chloride IVPB, calcium chloride IVPB, calcium chloride IVPB, dextrose 50%, dextrose 50%, glucagon (human recombinant), glucose, glucose, HYDROcodone-acetaminophen, magnesium oxide, magnesium sulfate IVPB, magnesium sulfate IVPB, magnesium sulfate IVPB, magnesium sulfate IVPB, melatonin, morphine, ondansetron, potassium chloride in water, potassium chloride in water, potassium chloride in water, potassium chloride in water, potassium chloride, potassium chloride, potassium chloride, potassium chloride, sodium chloride 0.9%, sodium phosphate IVPB, sodium phosphate IVPB, sodium phosphate IVPB, sodium phosphate IVPB, sodium phosphate IVPB, Pharmacy to dose Vancomycin consult **AND** vancomycin - pharmacy to dose    Review of Systems:  ROS    OBJECTIVE:     Vital Signs and IO (Last 24H):  Temp:  [97.7 °F (36.5 °C)-98.1 °F (36.7 °C)]   Pulse:  [56-75]   Resp:  [8-22]   BP: ()/(48-76)   SpO2:  [91 %-100 %]   I/O last 3 completed shifts:  In: 4880 [P.O.:680; I.V.:3750; IV  Piggyback:450]  Out: 6375 [Urine:1675; Stool:4700]    Wt Readings from Last 5 Encounters:   02/24/20 52.4 kg (115 lb 8.3 oz)   02/19/20 58.7 kg (129 lb 4.8 oz)   02/19/20 58.8 kg (129 lb 9.6 oz)   02/14/20 56.7 kg (125 lb)   02/05/20 56.9 kg (125 lb 6.4 oz)         Physical Exam:  Physical Exam   Constitutional: She is oriented to person, place, and time. She appears well-developed and well-nourished.   HENT:   Head: Normocephalic and atraumatic.   Mouth/Throat: Oropharynx is clear and moist.   Eyes: Pupils are equal, round, and reactive to light. EOM are normal. No scleral icterus.   Neck: Neck supple.   Cardiovascular: Normal rate and regular rhythm.   Pulmonary/Chest: Effort normal. No stridor. No respiratory distress.   Abdominal: Soft. She exhibits no distension.   Musculoskeletal: Normal range of motion. She exhibits no edema or deformity.   Neurological: She is alert and oriented to person, place, and time. No cranial nerve deficit.   Skin: Skin is warm and dry. No rash noted. She is not diaphoretic. No erythema.   Psychiatric: She has a normal mood and affect. Her behavior is normal.       Body mass index is 18.09 kg/m².    Laboratory:  Recent Labs   Lab 02/23/20  1530 02/24/20  0350 02/25/20  0325    139 141   K 3.9 3.6 3.3*   CL 79* 85* 87*   CO2 40* 41* 41*   BUN 36* 33* 37*   CREATININE 3.9* 3.8* 4.5*   ESTGFRAFRICA 12.8* 13.2* 10.8*   EGFRNONAA 11.1* 11.5* 9.4*    96 78       Recent Labs   Lab 02/23/20  1530 02/24/20  0350 02/25/20  0325   CALCIUM 9.0 8.1* 8.4*   ALBUMIN 3.7 2.9*  --    MG 1.5* 2.7* 2.4       Recent Labs   Lab 09/30/19  0820 11/15/19  0748   PTH, Intact 176.4 H 238.4 H       No results for input(s): POCTGLUCOSE in the last 168 hours.    Recent Labs   Lab 07/11/19  0806 11/15/19  0748 01/22/20  0848   Hemoglobin A1C 5.1 Invalid A SEE COMMENT       Recent Labs   Lab 02/23/20  1530 02/24/20  0350 02/25/20  0325   WBC 6.26 8.67 6.62   HGB 10.2* 8.2* 8.1*   HCT 33.8* 26.3* 26.2*     198 175   * 104* 104*   MCHC 30.2* 31.2* 30.9*   MONO 6.2  0.4 7.0  0.6 6.3  0.4       Recent Labs   Lab 02/23/20  1530 02/24/20  0350   BILITOT 1.9* 1.6*   PROT 6.1 4.7*   ALBUMIN 3.7 2.9*   ALKPHOS 231* 168*   ALT 25 20   AST 35 25       Recent Labs   Lab 02/10/20  2119 02/23/20  1715   Color, UA Yellow Yellow   Appearance, UA Hazy A Clear   pH, UA 6.0 >8.0 A   Specific [place], UA 1.010 1.010   Protein, UA Trace A 1+ A   Glucose, UA Negative Negative   Ketones, UA Negative Negative   Urobilinogen, UA Negative Negative   Bilirubin (UA) Negative Negative   Occult Blood UA 2+ A Negative   Nitrite, UA Negative Negative   RBC, UA 5 H 4   WBC, UA 38 H 19 H   Bacteria Negative Negative   Hyaline Casts, UA 29 A 19 A             Microbiology Results (last 7 days)     Procedure Component Value Units Date/Time    Urine culture [[id number]] Collected:  02/23/20 1715    Order Status:  Completed Specimen:  Urine Updated:  02/25/20 0758     Urine Culture, Routine No growth to date      No growth to date    Narrative:       Preferred Collection Type->Urine, Clean Catch  Specimen Source->Urine    Blood culture #1 **CANNOT BE ORDERED STAT** [[id number]] Collected:  02/23/20 1530    Order Status:  Completed Specimen:  Blood from Peripheral, Upper Arm, Right Updated:  02/24/20 1632     Blood Culture, Routine No Growth to date      No Growth to date    Blood culture #2 **CANNOT BE ORDERED STAT** [[id number]] Collected:  02/23/20 1545    Order Status:  Completed Specimen:  Blood from Peripheral, Upper Arm, Right Updated:  02/24/20 1632     Blood Culture, Routine No Growth to date      No Growth to date    Clostridium difficile EIA [[id number]] Collected:  02/23/20 2124    Order Status:  Completed Specimen:  Stool Updated:  02/23/20 2329     C. diff Antigen Negative     C difficile Toxins A+B, EIA Negative     Comment: Testing not recommended for children <24 months old.       Narrative:       Recoll. [id number] by CD3  at 02/23/2020 20:57, reason: Container not   labeled          ASSESSMENT/PLAN:     Active Hospital Problems    Diagnosis  POA    *Severe dehydration due to chronic diarrhea/high output ileostomy [E86.0]  Yes    DNR (do not resuscitate) [Z66]  Yes    Electrolyte imbalance [E87.8]  Yes    Severe malnutrition [E43]  Yes    DARCI (acute kidney injury) [N17.9]  Yes      Resolved Hospital Problems   No resolved problems to display.     DARCI due to volume depletion from high ostomy output  CKD stage 3  Hypomagnesemia  Acidosis  No NSAIDs, ACEI/ARB, IV contrast or other nephrotoxins.  Keep MAP > 60, SBP > 100.  Dose meds for GFR < 30 ml/min.  Renal diet - low K, low phos.  Agree with IVF, replete electrolytes as needed.  GI/surgey consult for management of high ostomy output.  sCr worse, will increase IVF.  K is worse, will increase KCL to 40 TID and make it liquid.    Anemia of CKD  Hgb and HCT are acceptable. Monitor.  Will provide ADAM and/or IV iron PRN.    MBD / Secondary HPT  PTH is high. Continue calcitriol.    HTN, now hypotension  BP seem controlled.   Tolerate asymptomatic HTN up to -160.  Hold home meds.    Thank you for allowing us to participate in the care of your patient!   We will follow the patient and provide recommendations as needed.    Jorge Webb MD    [facility]  [address] [place] [address]  [place] [address]    [phone] - tel  [phone] - fax    2/25/2020 10:50 AM

## 2020-02-25 NOTE — PLAN OF CARE
Monitoring I & O's closely. VSS. BP WNL. IVF infusing per MD order. Electrolytes replaced per MD order. Safety precautions in place and maintained. Pt OOB to chair. Pt education provided on medications, safety precautions, and current plan of care. Pt verbalizes understanding.

## 2020-02-25 NOTE — CONSULTS
GASTROENTEROLOGY INPATIENT CONSULT NOTE  Patient Name: Shara Hutchins  Patient MRN: [id number]  Patient : 1950    Admit Date: 2020  Service date: 2020    Reason for Consult: High ostomy output since ileostomy  Dehydration  Malnutrition    PCP: April Horton MD    Chief Complaint   Patient presents with    Fatigue     Recent ileosomy, 20lb weight loss this week, vaginal bleeding       HPI: Patient is a 69 y.o. female with PMHx  Post-radn/chemo colitis, managed with great difficulty. She recently underwent ileostomy for diversion purposes. She has had extremely high ostomy output since surgery     Past Medical History:  Past Medical History:   Diagnosis Date    Chronic kidney disease, stage III (moderate) 2018    Diabetes mellitus, type 2     Fatigue     Hypertension     Iron deficiency anemia due to chronic blood loss 2018    Vulvar cancer 2019        Past Surgical History:  Past Surgical History:   Procedure Laterality Date    ABDOMINAL SURGERY      BACK SURGERY      CERVICAL FUSION      x 4    cervix repair      CHOLECYSTECTOMY      DIAGNOSTIC LAPAROSCOPY N/A 2020    Procedure: LAPAROSCOPY, DIAGNOSTIC;  Surgeon: Robbi Lovell III, MD;  Location: [facility];  Service: General;  Laterality: N/A;    HYSTERECTOMY  1980    ILEOSTOMY N/A 2020    Procedure: CREATION, ILEOSTOMY Loop;  Surgeon: Robbi Lovell III, MD;  Location: [facility] OR;  Service: General;  Laterality: N/A;    LUMBAR LAMINECTOMY      LYSIS OF ADHESIONS N/A 2020    Procedure: LYSIS, ADHESIONS;  Surgeon: Robbi Lovell III, MD;  Location: [facility] OR;  Service: General;  Laterality: N/A;    NECK SURGERY      REMOVAL OF VASCULAR ACCESS PORT Right 2020    Procedure: REMOVAL, VASCULAR ACCESS PORT;  Surgeon: Robbi Lovell III, MD;  Location: [facility] OR;  Service: General;  Laterality: Right;    SHOULDER SURGERY      vulva cancer          Home Medications:  Medications Prior to  Admission   Medication Sig Dispense Refill Last Dose    calcitRIOL (ROCALTROL) 0.5 MCG Cap Take 1 capsule by mouth once daily.  3 2/23/2020 at 09:00    dicyclomine (BENTYL) 10 MG capsule TAKE 1 CAPSULE THREE TIMES A DAY (Patient taking differently: Take 10 mg by mouth 3 (three) times daily. ) 270 capsule 4 2/23/2020 at 09:00    diphenhydrAMINE (BENADRYL) 25 mg capsule Take 25 mg by mouth nightly as needed for Itching.   2/23/2020 at 21:00    dronabinol (MARINOL) 2.5 MG capsule Take 1 capsule (2.5 mg total) by mouth 2 (two) times daily before meals. 60 capsule 0 2/23/2020 at 09:00    HYDROcodone-acetaminophen (NORCO)  mg per tablet Take 1 tablet by mouth every 6 (six) hours as needed for Pain. 30 tablet 0 2/23/2020 at 09:00    levothyroxine (SYNTHROID) 88 MCG tablet TAKE 1 TABLET DAILY (Patient taking differently: Take 88 mcg by mouth every morning. ) 90 tablet 4 2/23/2020 at 09:00    magnesium oxide (MAG-OX) 400 mg (241.3 mg magnesium) tablet Take 400 mg by mouth 2 (two) times daily.   2/23/2020 at 09:00    potassium chloride (KLOR-CON) 10 MEQ TbSR Take 20 mEq by mouth 2 (two) times daily.    2/23/2020 at 09:00    promethazine (PHENERGAN) 25 MG tablet Take 1 tablet (25 mg total) by mouth daily as needed for Nausea. 90 tablet 0 2/23/2020 at 09:00    sertraline (ZOLOFT) 50 MG tablet Take 50 mg by mouth once daily.    2/23/2020 at 09:00    sodium bicarbonate 650 MG tablet Take 650 mg by mouth 3 (three) times daily.   2 2/23/2020 at 09:00       Inpatient Medications:   chlorhexidine  15 mL Mouth/Throat BID    dronabinol  2.5 mg Oral BID AC    levothyroxine  88 mcg Oral QAM    loperamide  4 mg Oral QID    magnesium oxide  400 mg Oral BID    meropenem (MERREM) IVPB  500 mg Intravenous Q12H    mupirocin   Nasal BID    pantoprazole  40 mg Oral Daily    potassium chloride  20 mEq Oral BID    psyllium husk (with sugar)  1 packet Oral TID    sertraline  50 mg Oral Daily     acetaminophen, calcium  chloride IVPB, calcium chloride IVPB, calcium chloride IVPB, dextrose 50%, dextrose 50%, glucagon (human recombinant), glucose, glucose, HYDROcodone-acetaminophen, magnesium oxide, magnesium sulfate IVPB, magnesium sulfate IVPB, magnesium sulfate IVPB, magnesium sulfate IVPB, melatonin, ondansetron, potassium chloride in water, potassium chloride in water, potassium chloride in water, potassium chloride in water, potassium chloride, potassium chloride, potassium chloride, potassium chloride, sodium chloride 0.9%, sodium phosphate IVPB, sodium phosphate IVPB, sodium phosphate IVPB, sodium phosphate IVPB, sodium phosphate IVPB, Pharmacy to dose Vancomycin consult **AND** vancomycin - pharmacy to dose    Review of patient's allergies indicates:   Allergen Reactions    Ciprofloxacin Other (See Comments)     Elevated liver enzymes      Pcn [penicillins] Anaphylaxis       Social History:   Social History     Occupational History    Not on file   Tobacco Use    Smoking status: Former Smoker     Packs/day: 1.00     Years: 33.00     Pack years: 33.00     Types: Cigarettes     Last attempt to quit: 2000     Years since quittin.6    Smokeless tobacco: Never Used   Substance and Sexual Activity    Alcohol use: No    Drug use: No    Sexual activity: Not on file       Family History:   Family History   Problem Relation Age of Onset    Heart disease Mother     Hypertension Mother     Cancer Mother         lung    Heart disease Father     Heart disease Sister         Open heart surgery    No Known Problems Daughter        Review of Systems:  A 10 point review of systems was performed and was normal, except as mentioned in the HPI, including constitutional, HEENT, heme, lymph, cardiovascular, respiratory, gastrointestinal, genitourinary, neurologic, endocrine, psychiatric and musculoskeletal.      OBJECTIVE:    Physical Exam:  24 Hour Vital Sign Ranges: Temp:  [97.4 °F (36.3 °C)-99.4 °F (37.4 °C)] 97.7 °F  "(36.5 °C)  Pulse:  [61-83] 71  Resp:  [11-62] 19  SpO2:  [86 %-100 %] 100 %  BP: ()/(52-84) 92/56  Most recent vitals: BP (!) 92/56   Pulse 71   Temp 97.7 °F (36.5 °C) (Oral)   Resp 19   Ht 5' 7" (1.702 m)   Wt 52.4 kg (115 lb 8.3 oz)   SpO2 100%   Breastfeeding? No   BMI 18.09 kg/m²    GEN: well-developed, well-nourished, awake and alert, non-toxic appearing adult  HEENT: PERRL, sclera anicteric, oral mucosa pink and moist without lesion  NECK: trachea midline; Good ROM  CV: regular rate and rhythm, no murmurs or gallops  RESP: clear to auscultation bilaterally, no wheezes, rhonci or rales  ABD: soft, non-tender, non-distended, normal bowel sounds. Healthy ostomy RLQ, healing incisions  EXT: no swelling or edema, 2+ pulses distally  SKIN: no rashes or jaundice  PSYCH: normal affect    Labs:   Recent Labs     02/23/20  1530 02/24/20  0350   WBC 6.26 8.67   * 104*    198     Recent Labs     02/23/20  1530 02/24/20  0350    139   K 3.9 3.6   CL 79* 85*   CO2 40* 41*   BUN 36* 33*    96     No results for input(s): ALB in the last 72 hours.    Invalid input(s): ALKP, SGOT, SGPT, TBIL, DBIL, TPRO  Recent Labs     02/23/20  1530   INR 1.4         Radiology Review:  X-Ray Chest AP Portable   Final Result      CT Abdomen Pelvis  Without Contrast   Final Result            IMPRESSION / RECOMMENDATIONS:  Malnutrition  Dehydration due to high ostomy output    Rec: Cholestyramine 2-4 doses daily  Lomotil alt with imodium  Hydration  Marinol  Consider Gattex as outpatiernt    Thank you for this consult.    Elia Fide  2/24/2020  6:16 PM      "

## 2020-02-25 NOTE — PLAN OF CARE
Problem: Malnutrition  Goal: Improved Nutritional Intake  Outcome: Ongoing, Progressing  Intervention: Promote and Optimize Oral Intake  Flowsheets (Taken 2/24/2020 1844)  Oral Nutrition Promotion: calorie dense liquids provided

## 2020-02-25 NOTE — PROGRESS NOTES
VANCOMYCIN PHARMACOKINETIC NOTE:  Vancomycin Day #2    Objective:    69 y.o., female, Actual Body Weight = 52.4 kg (115 lb 8.3 oz)    Diagnosis/Indication for Vancomycin:  Intra-abdominal infection and Skin & Soft Tissue Infection   Desired Vancomycin Peak:  30-35 mcg/ml; Desired Trough: 10-15 mcg/ml    Current Vancomycin Regimen:  750 IV every intermitting dosing (~30hours)    Antibiotics (From admission, onward)      Start     Stop Route Frequency Ordered    02/25/20 0400  vancomycin (VANCOCIN) 750 mg in dextrose 5 % 250 mL IVPB      -- IV Once 02/24/20 2240    02/24/20 1245  mupirocin 2 % ointment  (MRSA Decolonization Orders [facility])      02/29 0859 Nasl 2 times daily 02/24/20 1133    02/24/20 0600  meropenem 500 mg in sodium chloride 0.9 % 100 mL IVPB (ready to mix system)      -- IV Every 12 hours (non-standard times) 02/23/20 1923 02/23/20 2021  vancomycin - pharmacy to dose  (vancomycin IVPB)      -- IV pharmacy to manage frequency 02/23/20 1922             The patient has the following labs:     2/24/2020 Estimated Creatinine Clearance: 11.6 mL/min (A) (based on SCr of 3.8 mg/dL (H)). Lab Results   Component Value Date    BUN 33 (H) 02/24/2020       Lab Results   Component Value Date    WBC 8.67 02/24/2020          Random vancomycin:  15.4 mcg/mL collected 23.38 hours after Dose #1    Microbiology Results (last 7 days)       Procedure Component Value Units Date/Time    Blood culture #1 **CANNOT BE ORDERED STAT** [[id number]] Collected:  02/23/20 1530    Order Status:  Completed Specimen:  Blood from Peripheral, Upper Arm, Right Updated:  02/24/20 1632     Blood Culture, Routine No Growth to date      No Growth to date    Blood culture #2 **CANNOT BE ORDERED STAT** [[id number]] Collected:  02/23/20 1545    Order Status:  Completed Specimen:  Blood from Peripheral, Upper Arm, Right Updated:  02/24/20 1632     Blood Culture, Routine No Growth to date      No Growth to date    Urine culture [[id number]] Collected:   02/23/20 1715    Order Status:  Completed Specimen:  Urine Updated:  02/24/20 0655     Urine Culture, Routine No growth to date    Narrative:       Preferred Collection Type->Urine, Clean Catch  Specimen Source->Urine    Clostridium difficile EIA [[id number]] Collected:  02/23/20 2124    Order Status:  Completed Specimen:  Stool Updated:  02/23/20 2329     C. diff Antigen Negative     C difficile Toxins A+B, EIA Negative     Comment: Testing not recommended for children <24 months old.       Narrative:       Recoll. [id number] by [email or web address] at 02/23/2020 20:57, reason: Container not   labeled             Assessment:  Renal function is: stable but poor  Vancomycin trough is above goal range.    Vancomycin Pharmacokinetic Parameters  Elimination rate constant (ke) - 0.036 hr-1  Elimination half-life (t½) = 19.25 hours    Plan:  Will schedule next dose for 2/25/20 at 4:00, approximately 30 hours since last dose  Will obtain random vancomycin level 2/26/20 at/with 9:00 AM    Pharmacy will continue to manage vancomycin therapy and adjust regimen as necessary.    Thank you for allowing us to participate in this patient's care.     Aubrey Zuniga 2/24/2020 10:41 PM  Department of Pharmacy  Ext [phone]

## 2020-02-26 LAB
ANION GAP SERPL CALC-SCNC: 14 MMOL/L (ref 8–16)
BACTERIA UR CULT: NO GROWTH
BASOPHILS # BLD AUTO: 0.03 K/UL (ref 0–0.2)
BASOPHILS NFR BLD: 0.4 % (ref 0–1.9)
BUN SERPL-MCNC: 36 MG/DL (ref 8–23)
CALCIUM SERPL-MCNC: 8.8 MG/DL (ref 8.7–10.5)
CHLORIDE SERPL-SCNC: 84 MMOL/L (ref 95–110)
CO2 SERPL-SCNC: 44 MMOL/L (ref 23–29)
CREAT SERPL-MCNC: 5 MG/DL (ref 0.5–1.4)
DIFFERENTIAL METHOD: ABNORMAL
EOSINOPHIL # BLD AUTO: 0.2 K/UL (ref 0–0.5)
EOSINOPHIL NFR BLD: 2.4 % (ref 0–8)
ERYTHROCYTE [DISTWIDTH] IN BLOOD BY AUTOMATED COUNT: 14.5 % (ref 11.5–14.5)
EST. GFR  (AFRICAN AMERICAN): 9.5 ML/MIN/1.73 M^2
EST. GFR  (NON AFRICAN AMERICAN): 8.2 ML/MIN/1.73 M^2
GLUCOSE SERPL-MCNC: 69 MG/DL (ref 70–110)
HCT VFR BLD AUTO: 30.7 % (ref 37–48.5)
HGB BLD-MCNC: 9.3 G/DL (ref 12–16)
IMM GRANULOCYTES # BLD AUTO: 0.03 K/UL (ref 0–0.04)
IMM GRANULOCYTES NFR BLD AUTO: 0.4 % (ref 0–0.5)
LYMPHOCYTES # BLD AUTO: 1.4 K/UL (ref 1–4.8)
LYMPHOCYTES NFR BLD: 19.7 % (ref 18–48)
MAGNESIUM SERPL-MCNC: 2.3 MG/DL (ref 1.6–2.6)
MCH RBC QN AUTO: 32.3 PG (ref 27–31)
MCHC RBC AUTO-ENTMCNC: 30.3 G/DL (ref 32–36)
MCV RBC AUTO: 107 FL (ref 82–98)
MONOCYTES # BLD AUTO: 0.4 K/UL (ref 0.3–1)
MONOCYTES NFR BLD: 6 % (ref 4–15)
NEUTROPHILS # BLD AUTO: 5.1 K/UL (ref 1.8–7.7)
NEUTROPHILS NFR BLD: 71.1 % (ref 38–73)
NRBC BLD-RTO: 0 /100 WBC
PHOSPHATE SERPL-MCNC: 5.3 MG/DL (ref 2.7–4.5)
PLATELET # BLD AUTO: 205 K/UL (ref 150–350)
PMV BLD AUTO: 11.2 FL (ref 9.2–12.9)
POTASSIUM SERPL-SCNC: 3.9 MMOL/L (ref 3.5–5.1)
RBC # BLD AUTO: 2.88 M/UL (ref 4–5.4)
SODIUM SERPL-SCNC: 142 MMOL/L (ref 136–145)
VANCOMYCIN SERPL-MCNC: 19.4 UG/ML
WBC # BLD AUTO: 7.22 K/UL (ref 3.9–12.7)

## 2020-02-26 PROCEDURE — 21400001 HC TELEMETRY ROOM

## 2020-02-26 PROCEDURE — 25000003 PHARM REV CODE 250

## 2020-02-26 PROCEDURE — 83735 ASSAY OF MAGNESIUM: CPT

## 2020-02-26 PROCEDURE — 80048 BASIC METABOLIC PNL TOTAL CA: CPT

## 2020-02-26 PROCEDURE — 97535 SELF CARE MNGMENT TRAINING: CPT

## 2020-02-26 PROCEDURE — 63600175 PHARM REV CODE 636 W HCPCS: Performed by: INTERNAL MEDICINE

## 2020-02-26 PROCEDURE — 97116 GAIT TRAINING THERAPY: CPT | Mod: CQ

## 2020-02-26 PROCEDURE — 25000003 PHARM REV CODE 250: Performed by: SURGERY

## 2020-02-26 PROCEDURE — 97110 THERAPEUTIC EXERCISES: CPT

## 2020-02-26 PROCEDURE — 25000003 PHARM REV CODE 250: Performed by: INTERNAL MEDICINE

## 2020-02-26 PROCEDURE — C9113 INJ PANTOPRAZOLE SODIUM, VIA: HCPCS | Performed by: INTERNAL MEDICINE

## 2020-02-26 PROCEDURE — 36415 COLL VENOUS BLD VENIPUNCTURE: CPT

## 2020-02-26 PROCEDURE — 85025 COMPLETE CBC W/AUTO DIFF WBC: CPT

## 2020-02-26 PROCEDURE — 84100 ASSAY OF PHOSPHORUS: CPT

## 2020-02-26 PROCEDURE — 80202 ASSAY OF VANCOMYCIN: CPT

## 2020-02-26 RX ORDER — CHOLESTYRAMINE 4 G/4.8G
1 POWDER, FOR SUSPENSION ORAL 2 TIMES DAILY
Status: DISCONTINUED | OUTPATIENT
Start: 2020-02-26 | End: 2020-03-06 | Stop reason: HOSPADM

## 2020-02-26 RX ORDER — CODEINE SULFATE 30 MG/1
30 TABLET ORAL EVERY 6 HOURS PRN
Status: DISCONTINUED | OUTPATIENT
Start: 2020-02-26 | End: 2020-03-06 | Stop reason: HOSPADM

## 2020-02-26 RX ORDER — PANTOPRAZOLE SODIUM 40 MG/10ML
40 INJECTION, POWDER, LYOPHILIZED, FOR SOLUTION INTRAVENOUS 2 TIMES DAILY
Status: DISCONTINUED | OUTPATIENT
Start: 2020-02-26 | End: 2020-03-06 | Stop reason: HOSPADM

## 2020-02-26 RX ORDER — POTASSIUM CHLORIDE 20 MEQ/15ML
40 SOLUTION ORAL 2 TIMES DAILY
Status: DISCONTINUED | OUTPATIENT
Start: 2020-02-26 | End: 2020-03-06 | Stop reason: HOSPADM

## 2020-02-26 RX ORDER — CODEINE SULFATE 30 MG/1
15 TABLET ORAL EVERY 6 HOURS PRN
Status: DISCONTINUED | OUTPATIENT
Start: 2020-02-26 | End: 2020-02-26

## 2020-02-26 RX ADMIN — MAGNESIUM OXIDE 400 MG: 400 TABLET ORAL at 08:02

## 2020-02-26 RX ADMIN — SODIUM CHLORIDE, POTASSIUM CHLORIDE, SODIUM LACTATE AND CALCIUM CHLORIDE: 600; 310; 30; 20 INJECTION, SOLUTION INTRAVENOUS at 05:02

## 2020-02-26 RX ADMIN — CHLORHEXIDINE GLUCONATE 15 ML: 1.2 RINSE ORAL at 09:02

## 2020-02-26 RX ADMIN — DRONABINOL 2.5 MG: 2.5 CAPSULE ORAL at 05:02

## 2020-02-26 RX ADMIN — MORPHINE SULFATE 2 MG: 2 INJECTION, SOLUTION INTRAMUSCULAR; INTRAVENOUS at 08:02

## 2020-02-26 RX ADMIN — PSYLLIUM HUSK 1 PACKET: 3.4 POWDER ORAL at 08:02

## 2020-02-26 RX ADMIN — LEVOTHYROXINE SODIUM 88 MCG: 88 TABLET ORAL at 05:02

## 2020-02-26 RX ADMIN — LOPERAMIDE HYDROCHLORIDE 4 MG: 2 CAPSULE ORAL at 09:02

## 2020-02-26 RX ADMIN — MORPHINE SULFATE 2 MG: 2 INJECTION, SOLUTION INTRAMUSCULAR; INTRAVENOUS at 01:02

## 2020-02-26 RX ADMIN — CHLORHEXIDINE GLUCONATE 15 ML: 1.2 RINSE ORAL at 08:02

## 2020-02-26 RX ADMIN — PSYLLIUM HUSK 2 PACKET: 3.4 POWDER ORAL at 09:02

## 2020-02-26 RX ADMIN — HYDROCODONE BITARTRATE AND ACETAMINOPHEN 1 TABLET: 5; 325 TABLET ORAL at 05:02

## 2020-02-26 RX ADMIN — PANTOPRAZOLE SODIUM 40 MG: 40 TABLET, DELAYED RELEASE ORAL at 05:02

## 2020-02-26 RX ADMIN — OCTREOTIDE ACETATE 25 MCG/HR: 500 INJECTION, SOLUTION INTRAVENOUS; SUBCUTANEOUS at 10:02

## 2020-02-26 RX ADMIN — LOPERAMIDE HYDROCHLORIDE 4 MG: 2 CAPSULE ORAL at 05:02

## 2020-02-26 RX ADMIN — MEROPENEM 500 MG: 500 INJECTION, POWDER, FOR SOLUTION INTRAVENOUS at 08:02

## 2020-02-26 RX ADMIN — PSYLLIUM HUSK 2 PACKET: 3.4 POWDER ORAL at 04:02

## 2020-02-26 RX ADMIN — MAGNESIUM OXIDE 400 MG: 400 TABLET ORAL at 09:02

## 2020-02-26 RX ADMIN — PANTOPRAZOLE SODIUM 40 MG: 40 INJECTION, POWDER, LYOPHILIZED, FOR SOLUTION INTRAVENOUS at 09:02

## 2020-02-26 RX ADMIN — CHOLESTYRAMINE 4 G: 4 POWDER, FOR SUSPENSION ORAL at 09:02

## 2020-02-26 RX ADMIN — MUPIROCIN: 20 OINTMENT TOPICAL at 08:02

## 2020-02-26 RX ADMIN — MUPIROCIN: 20 OINTMENT TOPICAL at 09:02

## 2020-02-26 RX ADMIN — POTASSIUM CHLORIDE 40 MEQ: 1.5 SOLUTION ORAL at 09:02

## 2020-02-26 RX ADMIN — LEUCINE, PHENYLALANINE, LYSINE, METHIONINE, ISOLEUCINE, VALINE, HISTIDINE, THREONINE, TRYPTOPHAN, ALANINE, GLYCINE, ARGININE, PROLINE, SERINE, TYROSINE, DEXTROSE 1000 ML: 311; 238; 247; 170; 255; 247; 204; 179; 77; 880; 438; 489; 289; 213; 17; 5 INJECTION INTRAVENOUS at 04:02

## 2020-02-26 RX ADMIN — POTASSIUM CHLORIDE 40 MEQ: 20 SOLUTION ORAL at 08:02

## 2020-02-26 RX ADMIN — DRONABINOL 2.5 MG: 2.5 CAPSULE ORAL at 04:02

## 2020-02-26 RX ADMIN — SERTRALINE HYDROCHLORIDE 50 MG: 50 TABLET ORAL at 08:02

## 2020-02-26 RX ADMIN — LOPERAMIDE HYDROCHLORIDE 4 MG: 2 CAPSULE ORAL at 01:02

## 2020-02-26 RX ADMIN — LOPERAMIDE HYDROCHLORIDE 4 MG: 2 CAPSULE ORAL at 08:02

## 2020-02-26 NOTE — PLAN OF CARE
Problem: Physical Therapy Goal  Goal: Physical Therapy Goal  Description  Goals to be met by: D/C     Patient will increase functional independence with mobility by performin. Supine to sit with Contact Guard Assistance  2. Sit to stand transfer with Contact Guard Assistance  3. Bed to chair transfer with Contact Guard Assistance using Rolling Walker  4. Gait  x 100 feet with Contact Guard Assistance using Rolling Walker.      Outcome: Ongoing, Progressing   Pt continues to progress towards goals.

## 2020-02-26 NOTE — PROGRESS NOTES
SW received notification that pt's daughter was looking to speak with this SW. SW called and spoke with pt's daughter, Aruna [phone]. Aruna was wanting an update on any discharge plans, as she has concerns about the pt going home. SW informed her that a d/c plan has not been developed yet but SW would update her once there were updates available.

## 2020-02-26 NOTE — PHYSICIAN QUERY
PT Name: Shara Hutchins  MR #: [id number]    Physician Query Form - Nutrition Clarification     CDS/: Nina Eduardo Pe               Contact information: [phone]    This form is a permanent document in the medical record.     Query Date: February 26, 2020    By submitting this query, we are merely seeking further clarification of documentation.. Please utilize your independent clinical judgment when addressing the question(s) below.    The Medical record contains the following:     Diagnosis of Malnutrition   Per dietary - Severe malnutrition in the context of chronic illness related to suspected malabsorption due to chronic diarrhea and high ostomy &ileostomy output as evidence by patient states she eats fairly well at home but cannot eat enough to prevent weight loss, high &ileostomy output per I/Os, RN and hospital problem list, severe dehydration and electrolyte imbalance, severe weight loss of 11.4 kg (17.87%) in < 3 months, and physical findings of mild fat depletion of orbitals and triceps; severe fat depletion of thoracic and lumbar region; severe muscle depletion of temples, clavicles, scapular region and interosseous; and generalized trace edema noted.       AND / ASPEN Clinical Characteristics (October 2011)  A minimum of two characteristics is recommended for diagnosing either moderate or severe malnutrition   Mild Malnutrition Moderate Malnutrition Severe Malnutrition   Energy Intake from p.o., TF or TPN. < 75% intake of estimated energy needs for less than 7 days < 75% intake of estimated energy needs for greater than 7 days < 50% intake of estimated energy needs for > 5 days   Weight Loss 1-2% in 1 month  5% in 3 months  7.5% in 6 months  10% in 1 year 1-2 % in 1 week  5% in 1 month  7.5% in 3 months  10% in 6 months  20% in 1 year > 2% in 1 week  > 5% in 1 month  > 7.5% in 3 months  > 10% in 6 months  > 20% in 1 year   Physical Findings     None *Mild subcutaneous fat and/or muscle loss  *Mild  fluid accumulation  *Stage II decubitus  *Surgical wound or non-healing wound *Mod/severe subcutaneous fat and/or muscle loss  *Mod/severe fluid accumulation  *Stage III or IV decubitus  *Non-healing surgical wound     Provider, please specify the severity of malnutrition.     [  ] Mild Protein-Calorie Malnutrition   [  ] Moderate Protein-Calorie Malnutrition   [x  ] Severe Protein-Calorie Malnutrition   [  ] Cachexia   [  ] Anorexia   [  ] Abnormal Weight Loss   [  ] Underweight   [  ] Other Nutritional Diagnosis (please specify):    [  ] Other:    [  ] Clinically Undetermined       Please document in your progress notes daily for the duration of treatment until resolved and include in your discharge summary.

## 2020-02-26 NOTE — PROGRESS NOTES
Formerly [facility], later [facility]  Progress Note    Patient Name: Shara Hutchins  MRN: [id number]  Patient Class: IP- Inpatient   Admission Date: 2/23/2020  Length of Stay: 1 days  Attending Physician: Aleksander Phan MD  Primary Care Provider: April Horton MD        Subjective:     Principal Problem:Severe dehydration    Interval History:     Patient is still pretty not very large amount of fluid at ileostomy bag  Patient looks much more dehydrated and frail.  Renal function worsened  Appear to be not responding to the current treatment.  She had previous long segment bowel resection secondary to complicated post op   Review of Systems  Objective:     Vital Signs (Most Recent):  Temp: 96.9 °F (36.1 °C) (02/26/20 1131)  Pulse: 64 (02/26/20 1131)  Resp: 16 (02/26/20 1131)  BP: (!) 99/59 (02/26/20 1131)  SpO2: (!) 94 % (02/26/20 1131) Vital Signs (24h Range):  Temp:  [96.9 °F (36.1 °C)-98 °F (36.7 °C)] 96.9 °F (36.1 °C)  Pulse:  [61-67] 64  Resp:  [16-20] 16  SpO2:  [92 %-96 %] 94 %  BP: ()/(56-67) 99/59     Weight: 51 kg (112 lb 8.4 oz)  Body mass index is 17.62 kg/m².    Intake/Output Summary (Last 24 hours) at 2/26/2020 1545  Last data filed at 2/26/2020 1447  Gross per 24 hour   Intake 3230 ml   Output 5600 ml   Net -2370 ml      Physical Exam  Physical Exam:  General- Patient alert and oriented x3 in NAD  HEENT- PERRLA, EOMI, OP clear, MMM  Neck- No JVD, Lymphadenopathy, Thyromegaly  CV- Regular rate and rhythm, No Murmur/jackie/rubs  Resp- Lungs CTA Bilaterally, No increased WOB  GI- Non tender BS normoactive x4 quads  Extrem- No cyanosis, clubbing, edema  Neuro- Strength 5/5 flexors/extensors,Skin-  No masses, rashes or lesions noted on cursory skin exam.    Overview/Hospital Course:     Significant Labs:   CBC:   Recent Labs   Lab 02/25/20  0325 02/26/20  0420   WBC 6.62 7.22   HGB 8.1* 9.3*   HCT 26.2* 30.7*    205     CMP:   Recent Labs   Lab 02/25/20 0325 02/26/20  0420     142   K 3.3* 3.9   CL 87* 84*   CO2 41* 44*   GLU 78 69*   BUN 37* 36*   CREATININE 4.5* 5.0*   CALCIUM 8.4* 8.8   ANIONGAP 13 14   EGFRNONAA 9.4* 8.2*       Significant Imaging: I have reviewed all pertinent imaging results/findings within the past 24 hours.    Assessment/Plan:      Severe dehydration with hypotension  secondary to High output iliostomy;   Also possible Due to chemoradiation therapy induced chronic diarrhea worsened after ileostomy.     Increased  Ringer lactate dose and Clinimix ordered  Consult nutrition for TPN, insert PICC line  Increase Metamucil dose further  Loperamide 4mg QID. , add codeine tab  protonix change to IV   Add Questran  since patient is getting IV fluids more than 3 L per day and start Sandostatin  Follow-up GI, General surgery     Malnutrition with  weight loss of 20 lb in 1 week  Start  TPN     Lactic Acidosis secondary to dehydration , no sign of infection -resolved.C diff and blood cultures neg  Continue  antibiotics  given the cause could be intra-abdominal sepsis, enteritis    DARCI on CKD V:   Continue IV fluids high dose   Avoid nephrotoxins  Avoid NSAID, Ace inhibitors, arbs  Renal dose all medications      Vulvar cancer with skin excoriation due to XRT   wound care       Hypothyroidism:   continue Levothyroxine.     Anemia   Monitor and may need transfusion soon     Deconditioning:   Hypomagnesemia-resolved   DNR status        ________________________________________________________________________________          Active Diagnoses:    Diagnosis Date Noted POA    PRINCIPAL PROBLEM:  Severe dehydration due to chronic diarrhea/high output ileostomy [E86.0] 02/24/2020 Yes    DNR (do not resuscitate) [Z66] 02/24/2020 Yes    Electrolyte imbalance [E87.8] 02/24/2020 Yes    Severe malnutrition [E43] 02/24/2020 Yes    DARCI (acute kidney injury) [N17.9] 02/23/2020 Yes      Problems Resolved During this Admission:     VTE Risk Mitigation (From admission, onward)          Ordered     IP VTE HIGH RISK PATIENT  Once      02/23/20 2025     Reason for No Pharmacological VTE Prophylaxis  Once     Question:  Reasons:  Answer:  Risk of Bleeding    02/23/20 2025                   Aleksander Phan MD  Department of Hospital Medicine   Formerly [facility]

## 2020-02-26 NOTE — PT/OT/SLP PROGRESS
Occupational Therapy   Treatment    Name: Shara Hutchins  MRN: [id number]  Admitting Diagnosis:  Severe dehydration       Recommendations:     Discharge Recommendations: home, home with home health  Discharge Equipment Recommendations:  none  Barriers to discharge:  None    Assessment:     Shara Hutchins is a 69 y.o. female with a medical diagnosis of Severe dehydration.  She presents with decreased endurance. Pt performed B UE ROM exercises seated up in chair. Performance deficits affecting function are weakness, impaired endurance, impaired self care skills.     Rehab Prognosis:  Fair; patient would benefit from acute skilled OT services to address these deficits and reach maximum level of function.       Plan:     Patient to be seen 3 x/week to address the above listed problems via self-care/home management, therapeutic activities, therapeutic exercises  · Plan of Care Expires: 03/25/20  · Plan of Care Reviewed with: patient    Subjective     Pain/Comfort:  · Pain Rating 1: 0/10  · Pain Rating Post-Intervention 1: 0/10    Objective:     Communicated with: nurse prior to session.  Patient found up in chair with colostomy, lee catheter, peripheral IV, telemetry, central line upon OT entry to room.    General Precautions: Standard, fall   Orthopedic Precautions:N/A   Braces: N/A     Occupational Performance:     Functional Mobility/Transfers:  · Did not perform; pt seated up in chair for therapy    Activities of Daily Living:  · Grooming: modified independence to apply lotion to both legs while seated up in chair; pt made aware to be mindful while leaning forward due to osotomy bag    B UE ROM Exercises:  - 3 sets of 10 for shoulder flexion, internal/external rotation, tricep presses, and shoulder horizontal abduction using dowel  - verbal and tactile cuing at for blocking to prevent substitution for shoulder muscles for tricep presses      [facility] 6 Click ADL:      Treatment & Education:  Educated on importance of OOB  activities to negate prolonged effects of bed rest    Patient left up in chair with all lines intact and call button in reachEducation:      GOALS:   Multidisciplinary Problems     Occupational Therapy Goals        Problem: Occupational Therapy Goal    Goal Priority Disciplines Outcome Interventions   Occupational Therapy Goal     OT, PT/OT Ongoing, Progressing    Description:  Goals to be met by: discharge    Patient will increase functional independence with ADLs by performing:    Pt to be independent with UE HEP program of 3 x 10 reps each for reconditioning  UE Dressing with Supervision.  LE Dressing with Supervision.  Grooming while standing with Modified [place].  Toileting from toilet with Modified [place] for hygiene and clothing management.   Toilet transfer to toilet with Supervision.                       Time Tracking:     OT Date of Treatment: 02/26/20  OT Start Time: 1519  OT Stop Time: 1545  OT Total Time (min): 26 min    Billable Minutes:Self Care/Home Management 8  Therapeutic Exercise 18    ADRIAN Skelton  2/26/2020

## 2020-02-26 NOTE — NURSING
Spoke with Daniele Guevara, PICC line RN. He stated he spoke with Dr. Webb and he does not want pt to get PICC line due to declining renal function. Dr. Byrd then called and a decision is pending to put in a [place] instead. Will update night shift.

## 2020-02-26 NOTE — PLAN OF CARE
Problem: Occupational Therapy Goal  Goal: Occupational Therapy Goal  Description  Goals to be met by: discharge    Patient will increase functional independence with ADLs by performing:    Pt to be independent with UE HEP program of 3 x 10 reps each for reconditioning  UE Dressing with Supervision.  LE Dressing with Supervision.  Grooming while standing with Modified [place].  Toileting from toilet with Modified [place] for hygiene and clothing management.   Toilet transfer to toilet with Supervision.      Outcome: Ongoing, Progressing

## 2020-02-26 NOTE — PLAN OF CARE
Problem: Malnutrition  Goal: Improved Nutritional Intake  Outcome: Ongoing, Not Progressing  Intervention: Promote and Optimize Nutrition Support  Flowsheets (Taken 2/26/2020 1207)  Nutrition Support Management: parenteral nutrition recommended  In order for patient to meet estimated needs, patient would benefit from Total Parenteral Nutrition (TPN). Patient had 5L fluid loss from UO +OO today alone. Patient has severe malnurition and malabsorption issues, therefore is not receiving adequate nutrition. Patient wound need a PICC placed in order to receive TPN. RN to speak with MD regarding UO +OO and need for TPN.   If TPN desired, consult RD for management.

## 2020-02-26 NOTE — CONSULTS
Nephrology Consult Note        Patient Name: Shara Hutchins  MRN: [id number]    Patient Class: IP- Inpatient   Admission Date: 2/23/2020  Length of Stay: 1 days  Date of Service: 2/26/2020    Attending Physician: Aleksander Phan MD  Primary Care Provider: April Horton MD    Reason for Consult: darci/ckd3/hypovolemia/hypomagnesemia/shpt/anemia/htn    SUBJECTIVE:     HPI: 69F with HTN, DM2, hypothyroidism, secondary hyperparathyroidism, anemia, vulvar Ca (in remission), CKD3 presentswith abdominal pain, lightheadedness, vaginal bleeding. Had an ileostomy on 2/13, d/c on 2/17 (Dr. Meza) for chronic, debilitating diarrhea ? Pneumatosis intestinalis. Since then, she lost 20 lbs, has intermittent nausea and vomiting that is non-bloody and non-bilious, has high ostomy output - seeing whole pills at times. Pain to her abdominal incision site is worsening, had some lightheadedness when standing. Renal consulted for DARCI.    2/25 VSS, no new complains.  2/26 VSS, sCr worse today. UO+OO = > 5L today!!! Will have to step up IVF significantly to match loss + some....    Past Medical History:   Diagnosis Date    Chronic kidney disease, stage III (moderate) 1/7/2018    Diabetes mellitus, type 2     Fatigue     Hypertension     Iron deficiency anemia due to chronic blood loss 1/7/2018    Vulvar cancer 1/7/2019     Past Surgical History:   Procedure Laterality Date    ABDOMINAL SURGERY      BACK SURGERY      CERVICAL FUSION      x 4    cervix repair      CHOLECYSTECTOMY  2000    DIAGNOSTIC LAPAROSCOPY N/A 2/13/2020    Procedure: LAPAROSCOPY, DIAGNOSTIC;  Surgeon: Robbi Lovell III, MD;  Location: [facility] OR;  Service: General;  Laterality: N/A;    HYSTERECTOMY  1980    ILEOSTOMY N/A 2/13/2020    Procedure: CREATION, ILEOSTOMY Loop;  Surgeon: Robbi Lovell III, MD;  Location: [facility] OR;  Service: General;  Laterality: N/A;    LUMBAR LAMINECTOMY      LYSIS OF ADHESIONS N/A 2/13/2020    Procedure: LYSIS, ADHESIONS;   Surgeon: Robbi Lovell III, MD;  Location: [facility] OR;  Service: General;  Laterality: N/A;    NECK SURGERY      REMOVAL OF VASCULAR ACCESS PORT Right 2020    Procedure: REMOVAL, VASCULAR ACCESS PORT;  Surgeon: Robbi Lovell III, MD;  Location: [facility] OR;  Service: General;  Laterality: Right;    SHOULDER SURGERY      vulva cancer       Family History   Problem Relation Age of Onset    Heart disease Mother     Hypertension Mother     Cancer Mother         lung    Heart disease Father     Heart disease Sister         Open heart surgery    No Known Problems Daughter      Social History     Tobacco Use    Smoking status: Former Smoker     Packs/day: 1.00     Years: 33.00     Pack years: 33.00     Types: Cigarettes     Last attempt to quit: 2000     Years since quittin.6    Smokeless tobacco: Never Used   Substance Use Topics    Alcohol use: No    Drug use: No       Review of patient's allergies indicates:   Allergen Reactions    Ciprofloxacin Other (See Comments)     Elevated liver enzymes      Pcn [penicillins] Anaphylaxis       Outpatient meds:  No current facility-administered medications on file prior to encounter.      Current Outpatient Medications on File Prior to Encounter   Medication Sig Dispense Refill    calcitRIOL (ROCALTROL) 0.5 MCG Cap Take 1 capsule by mouth once daily.  3    dicyclomine (BENTYL) 10 MG capsule TAKE 1 CAPSULE THREE TIMES A DAY (Patient taking differently: Take 10 mg by mouth 3 (three) times daily. ) 270 capsule 4    diphenhydrAMINE (BENADRYL) 25 mg capsule Take 25 mg by mouth nightly as needed for Itching.      dronabinol (MARINOL) 2.5 MG capsule Take 1 capsule (2.5 mg total) by mouth 2 (two) times daily before meals. 60 capsule 0    HYDROcodone-acetaminophen (NORCO)  mg per tablet Take 1 tablet by mouth every 6 (six) hours as needed for Pain. 30 tablet 0    levothyroxine (SYNTHROID) 88 MCG tablet TAKE 1 TABLET DAILY (Patient taking  differently: Take 88 mcg by mouth every morning. ) 90 tablet 4    magnesium oxide (MAG-OX) 400 mg (241.3 mg magnesium) tablet Take 400 mg by mouth 2 (two) times daily.      potassium chloride (KLOR-CON) 10 MEQ TbSR Take 20 mEq by mouth 2 (two) times daily.       promethazine (PHENERGAN) 25 MG tablet Take 1 tablet (25 mg total) by mouth daily as needed for Nausea. 90 tablet 0    sertraline (ZOLOFT) 50 MG tablet Take 50 mg by mouth once daily.       sodium bicarbonate 650 MG tablet Take 650 mg by mouth 3 (three) times daily.   2       Scheduled meds:   chlorhexidine  15 mL Mouth/Throat BID    dronabinol  2.5 mg Oral BID AC    levothyroxine  88 mcg Oral QAM    loperamide  4 mg Oral QID    magnesium oxide  400 mg Oral BID    meropenem (MERREM) IVPB  500 mg Intravenous Daily    mupirocin   Nasal BID    pantoprazole  40 mg Oral Daily    potassium chloride 10%  40 mEq Oral TID    psyllium husk (with sugar)  1 packet Oral TID    sertraline  50 mg Oral Daily       Infusions:   lactated ringers 200 mL/hr at 02/26/20 0538       PRN meds:  acetaminophen, calcium chloride IVPB, calcium chloride IVPB, calcium chloride IVPB, dextrose 50%, dextrose 50%, glucagon (human recombinant), glucose, glucose, HYDROcodone-acetaminophen, magnesium oxide, magnesium sulfate IVPB, magnesium sulfate IVPB, magnesium sulfate IVPB, magnesium sulfate IVPB, melatonin, morphine, ondansetron, potassium chloride in water, potassium chloride in water, potassium chloride in water, potassium chloride in water, potassium chloride, potassium chloride, potassium chloride, potassium chloride, sodium chloride 0.9%, sodium phosphate IVPB, sodium phosphate IVPB, sodium phosphate IVPB, sodium phosphate IVPB, sodium phosphate IVPB, Pharmacy to dose Vancomycin consult **AND** vancomycin - pharmacy to dose    Review of Systems:  ROS    OBJECTIVE:     Vital Signs and IO (Last 24H):  Temp:  [97.7 °F (36.5 °C)-98 °F (36.7 °C)]   Pulse:  [61-72]   Resp:   [13-20]   BP: ()/(56-80)   SpO2:  [92 %-97 %]   I/O last 3 completed shifts:  In: 5161.3 [P.O.:1650; I.V.:3161.3; IV Piggyback:350]  Out: 8600 [Urine:1450; Stool:7150]    Wt Readings from Last 5 Encounters:   02/26/20 51 kg (112 lb 8.4 oz)   02/19/20 58.7 kg (129 lb 4.8 oz)   02/19/20 58.8 kg (129 lb 9.6 oz)   02/14/20 56.7 kg (125 lb)   02/05/20 56.9 kg (125 lb 6.4 oz)         Physical Exam:  Physical Exam   Constitutional: She is oriented to person, place, and time. She appears well-developed and well-nourished.   HENT:   Head: Normocephalic and atraumatic.   Mouth/Throat: Oropharynx is clear and moist.   Eyes: Pupils are equal, round, and reactive to light. EOM are normal. No scleral icterus.   Neck: Neck supple.   Cardiovascular: Normal rate and regular rhythm.   Pulmonary/Chest: Effort normal. No stridor. No respiratory distress.   Abdominal: Soft. She exhibits no distension.   Musculoskeletal: Normal range of motion. She exhibits no edema or deformity.   Neurological: She is alert and oriented to person, place, and time. No cranial nerve deficit.   Skin: Skin is warm and dry. No rash noted. She is not diaphoretic. No erythema.   Psychiatric: She has a normal mood and affect. Her behavior is normal.       Body mass index is 17.62 kg/m².    Laboratory:  Recent Labs   Lab 02/24/20  0350 02/25/20  0325 02/26/20  0420    141 142   K 3.6 3.3* 3.9   CL 85* 87* 84*   CO2 41* 41* 44*   BUN 33* 37* 36*   CREATININE 3.8* 4.5* 5.0*   ESTGFRAFRICA 13.2* 10.8* 9.5*   EGFRNONAA 11.5* 9.4* 8.2*   GLU 96 78 69*       Recent Labs   Lab 02/23/20  1530 02/24/20  0350 02/25/20  0325 02/26/20  0420   CALCIUM 9.0 8.1* 8.4* 8.8   ALBUMIN 3.7 2.9*  --   --    PHOS  --   --   --  5.3*   MG 1.5* 2.7* 2.4 2.3       Recent Labs   Lab 09/30/19  0820 11/15/19  0748   PTH, Intact 176.4 H 238.4 H       No results for input(s): POCTGLUCOSE in the last 168 hours.    Recent Labs   Lab 07/11/19  0806 11/15/19  0748 01/22/20  0848    Hemoglobin A1C 5.1 Invalid A SEE COMMENT       Recent Labs   Lab 02/24/20  0350 02/25/20  0325 02/26/20  0420   WBC 8.67 6.62 7.22   HGB 8.2* 8.1* 9.3*   HCT 26.3* 26.2* 30.7*    175 205   * 104* 107*   MCHC 31.2* 30.9* 30.3*   MONO 7.0  0.6 6.3  0.4 6.0  0.4       Recent Labs   Lab 02/23/20  1530 02/24/20  0350   BILITOT 1.9* 1.6*   PROT 6.1 4.7*   ALBUMIN 3.7 2.9*   ALKPHOS 231* 168*   ALT 25 20   AST 35 25       Recent Labs   Lab 02/10/20  2119 02/23/20  1715   Color, UA Yellow Yellow   Appearance, UA Hazy A Clear   pH, UA 6.0 >8.0 A   Specific [place], UA 1.010 1.010   Protein, UA Trace A 1+ A   Glucose, UA Negative Negative   Ketones, UA Negative Negative   Urobilinogen, UA Negative Negative   Bilirubin (UA) Negative Negative   Occult Blood UA 2+ A Negative   Nitrite, UA Negative Negative   RBC, UA 5 H 4   WBC, UA 38 H 19 H   Bacteria Negative Negative   Hyaline Casts, UA 29 A 19 A             Microbiology Results (last 7 days)     Procedure Component Value Units Date/Time    Urine culture [[id number]] Collected:  02/23/20 1715    Order Status:  Completed Specimen:  Urine Updated:  02/26/20 0947     Urine Culture, Routine No growth    Narrative:       Preferred Collection Type->Urine, Clean Catch  Specimen Source->Urine    Blood culture #1 **CANNOT BE ORDERED STAT** [[id number]] Collected:  02/23/20 1530    Order Status:  Completed Specimen:  Blood from Peripheral, Upper Arm, Right Updated:  02/25/20 1632     Blood Culture, Routine No Growth to date      No Growth to date      No Growth to date    Blood culture #2 **CANNOT BE ORDERED STAT** [[id number]] Collected:  02/23/20 1545    Order Status:  Completed Specimen:  Blood from Peripheral, Upper Arm, Right Updated:  02/25/20 1632     Blood Culture, Routine No Growth to date      No Growth to date      No Growth to date    Clostridium difficile EIA [[id number]] Collected:  02/23/20 2124    Order Status:  Completed Specimen:  Stool Updated:   02/23/20 2329     C. diff Antigen Negative     C difficile Toxins A+B, EIA Negative     Comment: Testing not recommended for children <24 months old.       Narrative:       Recoll. [id number] by CD3 at 02/23/2020 20:57, reason: Container not   labeled          ASSESSMENT/PLAN:     Active Hospital Problems    Diagnosis  POA    *Severe dehydration due to chronic diarrhea/high output ileostomy [E86.0]  Yes    DNR (do not resuscitate) [Z66]  Yes    Electrolyte imbalance [E87.8]  Yes    Severe malnutrition [E43]  Yes    DARCI (acute kidney injury) [N17.9]  Yes      Resolved Hospital Problems   No resolved problems to display.     DARCI due to volume depletion from high ostomy output  CKD stage 3  Hypomagnesemia  Acidosis  No NSAIDs, ACEI/ARB, IV contrast or other nephrotoxins.  Keep MAP > 60, SBP > 100.  Dose meds for GFR < 30 ml/min.  Renal diet - low K, low phos.  Agree with IVF, replete electrolytes as needed.  GI/surgey consult for management of high ostomy output.  sCr worse, will increase IVF. UO+OO = > 5L today!!! Will have to step up IVF to match loss + some for insensible losses....  K is better, will decrease KCL to 40 BID, liquid.    Anemia of CKD  Hgb and HCT are acceptable. Monitor.  Will provide ADAM and/or IV iron PRN.    MBD / Secondary HPT  PTH is high. Continue calcitriol.    HTN, now hypotension  BP seem controlled.   Tolerate asymptomatic HTN up to -160.  Hold home meds.    Thank you for allowing us to participate in the care of your patient!   We will follow the patient and provide recommendations as needed.    Jorge Webb MD    [facility]  [address] [place] [address]  [place] [address]    [phone] - tel  [phone] - fax    2/26/2020 10:50 AM

## 2020-02-26 NOTE — PT/OT/SLP PROGRESS
Physical Therapy Treatment    Patient Name:  Shara Hutchins   MRN:  [id number]    Recommendations:     Discharge Recommendations:  home health PT   Discharge Equipment Recommendations: walker, rolling   Barriers to discharge: None    Assessment:     Shara Hutchins is a 69 y.o. female admitted with a medical diagnosis of Severe dehydration.  She presents with the following impairments/functional limitations:  weakness, impaired endurance, impaired self care skills, impaired functional mobilty, impaired cardiopulmonary response to activity, gait instability, decreased safety awareness, pain, impaired skin. Patient progressing with mobility today, initiating gait training. Pt limited 2/2 fatigue today. No LOB or SOB noted. Continue with PT and POC.    Rehab Prognosis: Good; patient would benefit from acute skilled PT services to address these deficits and reach maximum level of function.    Recent Surgery: * No surgery found *      Plan:     During this hospitalization, patient to be seen 6 x/week to address the identified rehab impairments via gait training, therapeutic activities, therapeutic exercises and progress toward the following goals:    · Plan of Care Expires:  03/24/20    Subjective     Chief Complaint: Fatigue  Patient/Family Comments/goals:   Pain/Comfort:  · Pain Rating 1: 0/10      Objective:     Communicated with RN prior to session.  Patient found HOB elevated with bed alarm, telemetry, lee catheter, colostomy upon PT entry to room.     General Precautions: Standard, fall   Orthopedic Precautions:N/A   Braces:       Functional Mobility:  · Bed Mobility:     · Supine to Sit: supervision  · Transfers:     · Sit to Stand:  contact guard assistance with no AD  · Bed to Chair: contact guard assistance with  no AD  using  Step Transfer  · Gait: 15ft without AD and CGA      AM-PAC 6 CLICK MOBILITY          Therapeutic Activities and Exercises:   bed mobility; sitting EOB for trunk control and midline orientation;  sit <> stands; transfer training; PT educated pt/ family on importance of out of bed to chair and functional mobility to negate negative effects of prolonged bed rest. Will progress activity as tolerated by patient    Patient left up in chair with all lines intact and call button in reach..    GOALS:   Multidisciplinary Problems     Physical Therapy Goals        Problem: Physical Therapy Goal    Goal Priority Disciplines Outcome Goal Variances Interventions   Physical Therapy Goal     PT, PT/OT Ongoing, Progressing     Description:  Goals to be met by: D/C     Patient will increase functional independence with mobility by performin. Supine to sit with Contact Guard Assistance  2. Sit to stand transfer with Contact Guard Assistance  3. Bed to chair transfer with Contact Guard Assistance using Rolling Walker  4. Gait  x 100 feet with Contact Guard Assistance using Rolling Walker.                       Time Tracking:     PT Received On: 20  PT Start Time: 1453     PT Stop Time: 1503  PT Total Time (min): 10 min     Billable Minutes: Gait Training 10    Treatment Type: Treatment  PT/PTA: PTA     PTA Visit Number: Rajani     Amanda Ayers, ERVIN  2020

## 2020-02-26 NOTE — NURSING
Spoke with pharmacy regarding clinimix and LR compatible together, stated okay to ysite in with clinimix going first.  Dr. Byrd and Tracey confirmed wanting Clinimix and LR given to complete a total of 250cc/hr.

## 2020-02-26 NOTE — PROGRESS NOTES
[facility]  Adult Nutrition   Progress Note (Follow-Up)    SUMMARY     Addendum: RD consulted for TPN. Patient currently does not have a PICC line placed (consult put in for placement). RD ordered Clinimix @ 50mL/hr to provide 408 kcals, 51 g protein until PICC is placed. Adjusted lactated ringers (200mL/hr) due to initiation of clinimix in order to equal total volume of 6000 mL/day. Once PICC line placed, will start TPN. Will monitor fluid/hydration status.    Recommendations/Interventions:  1. In order for patient to meet estimated needs, patient would benefit from Total Parenteral Nutrition (TPN). Patient had 5L fluid loss from UO +OO today alone. Patient has severe malnurition and malabsorption issues, therefore is not receiving adequate nutrition. Patient wound need a PICC placed in order to receive TPN. RN to speak with MD regarding UO +OO and need for TPN.   2. If TPN desired, consult RD for management.     Goals:  1. TPN to be considered.   2. [place] Ouput to decrease.  Nutrition Goal Status: new  Communication of RD Recs: reviewed with RN    Dietitian Rounds Brief:  · Seen 2' f/u. RN in room during assessment. Spoke with RN about UO+ OO (5L today alone). RN stated food is going right through her. Creatinine increased, glucose decreased, phosphorus increased. Patient would benefit from TPN. Will continue to monitor I/O's and plan of care.  ·   Nutritional Diagnosis (PES Statement)   Severe malnutrition in the context of chronic illness related to suspected malabsorption due to chronic diarrhea and high ostomy ileostomy output as evidence by patient states she eats fairly well at home but cannot eat enough to prevent weight loss, high ileostomy output per I/Os, RN and hospital problem list, severe dehydration and electrolyte imbalance, severe weight loss of 11.4 kg (17.87%) in < 3 months, and physical findings of mild fat depletion of orbitals and triceps; severe fat depletion of thoracic and  "lumbar region; severe muscle depletion of temples, clavicles, scapular region and interosseous; and generalized trace edema noted.     Reason for Assessment    Reason For Assessment: RD follow-up  Relevant Medical History: Vulvar cancer, CKD stage III (moderate), T2DM, fatigue, HTN, ileostomy (20), DARCI, severe dehydration due to chronic diarrhea/high output ileostomy, electrolyte imbalance  Interdisciplinary Rounds: attended    Nutrition Risk Screen    Nutrition Risk Screen: (severe weight loss, malabsorption )       Wound 20 Moisture associated dermatitis medial Perineum #1-Wound Image: Images linked  MST Score: 2  Have you recently lost weight without trying?: Yes: 14-23 lbs  Weight loss score: 2  Have you been eating poorly because of a decreased appetite?: No  Appetite score: 0       Nutrition/Diet History    Patient Reported Diet/Restrictions/Preferences: general  Food Allergies: NKFA  Factors Affecting Nutritional Intake: decreased appetite, diarrhea, malabsorption, altered gastrointestinal function    Anthropometrics    Temp: 96.9 °F (36.1 °C)  Height Method: Stated  Height: 5' 7" (170.2 cm)  Height (inches): 67 in  Weight Method: Bed Scale  Weight: 51 kg (112 lb 8.4 oz)  Weight (lb): 112.52 lb  Ideal Body Weight (IBW), Female: 135 lb  % Ideal Body Weight, Female (lb): 85.57 %  BMI (Calculated): 17.6  BMI Grade: 17 - 18.4 protein-energy malnutrition grade I  Weight Loss: unintentional  Usual Body Weight (UBW), k.8 kg  Weight Change Amount: 28 lb  % Usual Body Weight: 80.17  % Weight Change From Usual Weight: -20 %     Weight History:  Wt Readings from Last 10 Encounters:   20 51 kg (112 lb 8.4 oz)   20 58.7 kg (129 lb 4.8 oz)   20 58.8 kg (129 lb 9.6 oz)   20 56.7 kg (125 lb)   20 56.9 kg (125 lb 6.4 oz)   20 57.2 kg (126 lb)   20 61.4 kg (135 lb 6.4 oz)   19 59 kg (130 lb)   19 61.8 kg (136 lb 4.8 oz)   19 63.8 kg (140 lb 10.5 " oz)   }  Lab/Procedures/Meds: Pertinent Labs Reviewed  Clinical Chemistry:  Recent Labs   Lab 02/23/20  1530 02/24/20  0350 02/26/20  0420    139 142   K 3.9 3.6 3.9   CL 79* 85* 84*   CO2 40* 41* 44*    96 69*   BUN 36* 33* 36*   CREATININE 3.9* 3.8* 5.0*   CALCIUM 9.0 8.1* 8.8   PROT 6.1 4.7*  --    ALBUMIN 3.7 2.9*  --    BILITOT 1.9* 1.6*  --    ALKPHOS 231* 168*  --    AST 35 25  --    ALT 25 20  --    ANIONGAP 25* 13 14   ESTGFRAFRICA 12.8* 13.2* 9.5*   EGFRNONAA 11.1* 11.5* 8.2*   MG 1.5* 2.7* 2.3   PHOS  --   --  5.3*   LIPASE 41  --   --     < > = values in this interval not displayed.     CBC:   Recent Labs   Lab 02/26/20  0420   WBC 7.22   RBC 2.88*   HGB 9.3*   HCT 30.7*      *   MCH 32.3*   MCHC 30.3*     Thyroid & Parathyroid:  Recent Labs   Lab 02/23/20  1530   TSH 7.180*   FREET4 1.16     Medications: Pertinent Medications reviewed  Scheduled Meds:   chlorhexidine  15 mL Mouth/Throat BID    dronabinol  2.5 mg Oral BID AC    levothyroxine  88 mcg Oral QAM    loperamide  4 mg Oral QID    magnesium oxide  400 mg Oral BID    meropenem (MERREM) IVPB  500 mg Intravenous Daily    mupirocin   Nasal BID    pantoprazole  40 mg Oral Daily    potassium chloride 10%  40 mEq Oral BID    psyllium husk (with sugar)  1 packet Oral TID    sertraline  50 mg Oral Daily     Continuous Infusions:   lactated ringers 200 mL/hr at 02/26/20 0538     PRN Meds:.acetaminophen, calcium chloride IVPB, calcium chloride IVPB, calcium chloride IVPB, dextrose 50%, dextrose 50%, glucagon (human recombinant), glucose, glucose, HYDROcodone-acetaminophen, magnesium oxide, magnesium sulfate IVPB, magnesium sulfate IVPB, magnesium sulfate IVPB, magnesium sulfate IVPB, melatonin, morphine, ondansetron, potassium chloride in water, potassium chloride in water, potassium chloride in water, potassium chloride in water, potassium chloride, potassium chloride, potassium chloride, potassium chloride, sodium  chloride 0.9%, sodium phosphate IVPB, sodium phosphate IVPB, sodium phosphate IVPB, sodium phosphate IVPB, sodium phosphate IVPB, Pharmacy to dose Vancomycin consult **AND** vancomycin - pharmacy to dose    Estimated/Assessed Needs    Weight Used For Calorie Calculations: 52.4 kg (115 lb 8.3 oz)  Energy Calorie Requirements (kcal): 7544-3274 kcals/day (35-40 kcals/kg)  Energy Need Method: Kcal/kg  Protein Requirements:  g/day (1.5-2.0 g/kg)  Weight Used For Protein Calculations: 52.4 kg (115 lb 8.3 oz)  Fluid Requirements (mL): 1572 (30mL/kg) or to maintain hydration        Nutrition Prescription Ordered    Current Diet Order: Regular Diet   Oral Nutrition Supplement: Ensure Enlve TID (Chocolate)    Evaluation of Received Nutrient/Fluid Intake    Energy Calories Required: not meeting needs  Protein Required: not meeting needs  Fluid Required: not meeting needs  Tolerance: not tolerating  % Intake of Estimated Energy Needs: 25 - 50 %  % Meal Intake: 25 - 50 %    Intake/Output Summary (Last 24 hours) at 2/26/2020 1154  Last data filed at 2/26/2020 1004  Gross per 24 hour   Intake 3626.25 ml   Output 5550 ml   Net -1923.75 ml      Nutrition Risk    Level of Risk/Frequency of Follow-up: high     Monitor and Evaluation    Food and Nutrient Intake: energy intake, food and beverage intake  Food and Nutrient Adminstration: diet order  Physical Activity and Function: nutrition-related ADLs and IADLs, factors affecting access to physical activity  Anthropometric Measurements: weight, weight change, body mass index  Biochemical Data, Medical Tests and Procedures: electrolyte and renal panel, glucose/endocrine profile, lipid profile, gastrointestinal profile, inflammatory profile  Nutrition-Focused Physical Findings: overall appearance, extremities, muscles and bones, head and eyes, skin     Malnutrition Assessment  Malnutrition Type: chronic illness  Nutrition Follow-Up    RD Follow-up?: Yes  Kitty Blanco, SHERIF 02/26/2020  12:06 PM

## 2020-02-27 ENCOUNTER — EXTERNAL HOME HEALTH (OUTPATIENT)
Dept: HOME HEALTH SERVICES | Facility: HOSPITAL | Age: 70
End: 2020-02-27

## 2020-02-27 LAB
ANION GAP SERPL CALC-SCNC: 13 MMOL/L (ref 8–16)
BASOPHILS # BLD AUTO: 0.03 K/UL (ref 0–0.2)
BASOPHILS NFR BLD: 0.4 % (ref 0–1.9)
BUN SERPL-MCNC: 32 MG/DL (ref 8–23)
CALCIUM SERPL-MCNC: 8.1 MG/DL (ref 8.7–10.5)
CHLORIDE SERPL-SCNC: 92 MMOL/L (ref 95–110)
CO2 SERPL-SCNC: 35 MMOL/L (ref 23–29)
CREAT SERPL-MCNC: 4.3 MG/DL (ref 0.5–1.4)
DIFFERENTIAL METHOD: ABNORMAL
EOSINOPHIL # BLD AUTO: 0.3 K/UL (ref 0–0.5)
EOSINOPHIL NFR BLD: 4.7 % (ref 0–8)
ERYTHROCYTE [DISTWIDTH] IN BLOOD BY AUTOMATED COUNT: 14.3 % (ref 11.5–14.5)
EST. GFR  (AFRICAN AMERICAN): 11.4 ML/MIN/1.73 M^2
EST. GFR  (NON AFRICAN AMERICAN): 9.9 ML/MIN/1.73 M^2
GLUCOSE SERPL-MCNC: 111 MG/DL (ref 70–110)
HCT VFR BLD AUTO: 26.8 % (ref 37–48.5)
HGB BLD-MCNC: 8 G/DL (ref 12–16)
IMM GRANULOCYTES # BLD AUTO: 0.04 K/UL (ref 0–0.04)
IMM GRANULOCYTES NFR BLD AUTO: 0.6 % (ref 0–0.5)
LYMPHOCYTES # BLD AUTO: 1 K/UL (ref 1–4.8)
LYMPHOCYTES NFR BLD: 14.8 % (ref 18–48)
MAGNESIUM SERPL-MCNC: 1.8 MG/DL (ref 1.6–2.6)
MCH RBC QN AUTO: 32.7 PG (ref 27–31)
MCHC RBC AUTO-ENTMCNC: 29.9 G/DL (ref 32–36)
MCV RBC AUTO: 109 FL (ref 82–98)
MONOCYTES # BLD AUTO: 0.4 K/UL (ref 0.3–1)
MONOCYTES NFR BLD: 6.4 % (ref 4–15)
NEUTROPHILS # BLD AUTO: 5 K/UL (ref 1.8–7.7)
NEUTROPHILS NFR BLD: 73.1 % (ref 38–73)
NRBC BLD-RTO: 0 /100 WBC
PHOSPHATE SERPL-MCNC: 4.7 MG/DL (ref 2.7–4.5)
PLATELET # BLD AUTO: 186 K/UL (ref 150–350)
PMV BLD AUTO: 11.1 FL (ref 9.2–12.9)
POTASSIUM SERPL-SCNC: 4.9 MMOL/L (ref 3.5–5.1)
RBC # BLD AUTO: 2.45 M/UL (ref 4–5.4)
SODIUM SERPL-SCNC: 140 MMOL/L (ref 136–145)
T3 SERPL-MCNC: 44 NG/DL (ref 71–180)
T4 SERPL-MCNC: 7.3 UG/DL (ref 4.5–12)
VANCOMYCIN SERPL-MCNC: 17 UG/ML
WBC # BLD AUTO: 6.83 K/UL (ref 3.9–12.7)

## 2020-02-27 PROCEDURE — 36415 COLL VENOUS BLD VENIPUNCTURE: CPT

## 2020-02-27 PROCEDURE — C9113 INJ PANTOPRAZOLE SODIUM, VIA: HCPCS | Performed by: INTERNAL MEDICINE

## 2020-02-27 PROCEDURE — 80202 ASSAY OF VANCOMYCIN: CPT

## 2020-02-27 PROCEDURE — B4185 PARENTERAL SOL 10 GM LIPIDS: HCPCS | Performed by: INTERNAL MEDICINE

## 2020-02-27 PROCEDURE — 85025 COMPLETE CBC W/AUTO DIFF WBC: CPT

## 2020-02-27 PROCEDURE — 80048 BASIC METABOLIC PNL TOTAL CA: CPT

## 2020-02-27 PROCEDURE — 25000003 PHARM REV CODE 250: Performed by: INTERNAL MEDICINE

## 2020-02-27 PROCEDURE — 63600175 PHARM REV CODE 636 W HCPCS: Performed by: INTERNAL MEDICINE

## 2020-02-27 PROCEDURE — 84100 ASSAY OF PHOSPHORUS: CPT

## 2020-02-27 PROCEDURE — 25000003 PHARM REV CODE 250

## 2020-02-27 PROCEDURE — 21400001 HC TELEMETRY ROOM

## 2020-02-27 PROCEDURE — 83735 ASSAY OF MAGNESIUM: CPT

## 2020-02-27 PROCEDURE — 97116 GAIT TRAINING THERAPY: CPT | Mod: CQ

## 2020-02-27 RX ADMIN — SOYBEAN OIL 250 ML: 20 INJECTION, SOLUTION INTRAVENOUS at 03:02

## 2020-02-27 RX ADMIN — CHOLESTYRAMINE 4 G: 4 POWDER, FOR SUSPENSION ORAL at 11:02

## 2020-02-27 RX ADMIN — SODIUM CHLORIDE, POTASSIUM CHLORIDE, SODIUM LACTATE AND CALCIUM CHLORIDE: 600; 310; 30; 20 INJECTION, SOLUTION INTRAVENOUS at 07:02

## 2020-02-27 RX ADMIN — LOPERAMIDE HYDROCHLORIDE 4 MG: 2 CAPSULE ORAL at 03:02

## 2020-02-27 RX ADMIN — SERTRALINE HYDROCHLORIDE 50 MG: 50 TABLET ORAL at 11:02

## 2020-02-27 RX ADMIN — CHLORHEXIDINE GLUCONATE 15 ML: 1.2 RINSE ORAL at 10:02

## 2020-02-27 RX ADMIN — PANTOPRAZOLE SODIUM 40 MG: 40 INJECTION, POWDER, LYOPHILIZED, FOR SOLUTION INTRAVENOUS at 10:02

## 2020-02-27 RX ADMIN — SODIUM CHLORIDE, POTASSIUM CHLORIDE, SODIUM LACTATE AND CALCIUM CHLORIDE: 600; 310; 30; 20 INJECTION, SOLUTION INTRAVENOUS at 12:02

## 2020-02-27 RX ADMIN — LEVOTHYROXINE SODIUM 88 MCG: 88 TABLET ORAL at 05:02

## 2020-02-27 RX ADMIN — MORPHINE SULFATE 2 MG: 2 INJECTION, SOLUTION INTRAMUSCULAR; INTRAVENOUS at 07:02

## 2020-02-27 RX ADMIN — LOPERAMIDE HYDROCHLORIDE 4 MG: 2 CAPSULE ORAL at 10:02

## 2020-02-27 RX ADMIN — PSYLLIUM HUSK 2 PACKET: 3.4 POWDER ORAL at 03:02

## 2020-02-27 RX ADMIN — PSYLLIUM HUSK 2 PACKET: 3.4 POWDER ORAL at 10:02

## 2020-02-27 RX ADMIN — MUPIROCIN: 20 OINTMENT TOPICAL at 11:02

## 2020-02-27 RX ADMIN — DRONABINOL 2.5 MG: 2.5 CAPSULE ORAL at 05:02

## 2020-02-27 RX ADMIN — MORPHINE SULFATE 2 MG: 2 INJECTION, SOLUTION INTRAMUSCULAR; INTRAVENOUS at 03:02

## 2020-02-27 RX ADMIN — PANTOPRAZOLE SODIUM 40 MG: 40 INJECTION, POWDER, LYOPHILIZED, FOR SOLUTION INTRAVENOUS at 11:02

## 2020-02-27 RX ADMIN — ONDANSETRON 8 MG: 4 TABLET, ORALLY DISINTEGRATING ORAL at 11:02

## 2020-02-27 RX ADMIN — CHLORHEXIDINE GLUCONATE 15 ML: 1.2 RINSE ORAL at 11:02

## 2020-02-27 RX ADMIN — MAGNESIUM OXIDE 400 MG: 400 TABLET ORAL at 11:02

## 2020-02-27 RX ADMIN — DRONABINOL 2.5 MG: 2.5 CAPSULE ORAL at 03:02

## 2020-02-27 RX ADMIN — LOPERAMIDE HYDROCHLORIDE 4 MG: 2 CAPSULE ORAL at 11:02

## 2020-02-27 RX ADMIN — MUPIROCIN: 20 OINTMENT TOPICAL at 10:02

## 2020-02-27 RX ADMIN — MAGNESIUM OXIDE 400 MG: 400 TABLET ORAL at 10:02

## 2020-02-27 RX ADMIN — PSYLLIUM HUSK 2 PACKET: 3.4 POWDER ORAL at 11:02

## 2020-02-27 RX ADMIN — CHOLESTYRAMINE 4 G: 4 POWDER, FOR SUSPENSION ORAL at 10:02

## 2020-02-27 RX ADMIN — LEUCINE, PHENYLALANINE, LYSINE, METHIONINE, ISOLEUCINE, VALINE, HISTIDINE, THREONINE, TRYPTOPHAN, ALANINE, GLYCINE, ARGININE, PROLINE, SERINE, TYROSINE, DEXTROSE 1000 ML: 311; 238; 247; 170; 255; 247; 204; 179; 77; 880; 438; 489; 289; 213; 17; 5 INJECTION INTRAVENOUS at 03:02

## 2020-02-27 RX ADMIN — POTASSIUM CHLORIDE 40 MEQ: 1.5 SOLUTION ORAL at 11:02

## 2020-02-27 RX ADMIN — OCTREOTIDE ACETATE 25 MCG/HR: 500 INJECTION, SOLUTION INTRAVENOUS; SUBCUTANEOUS at 06:02

## 2020-02-27 RX ADMIN — MEROPENEM 500 MG: 500 INJECTION, POWDER, FOR SOLUTION INTRAVENOUS at 11:02

## 2020-02-27 NOTE — PROGRESS NOTES
[facility] Medicine  Progress Note    Patient Name: Shara Hutchins  MRN: [id number]  Patient Class: IP- Inpatient   Admission Date: 2/23/2020  Length of Stay: 2 days  Attending Physician: Aleksander Phan MD  Primary Care Provider: April Horton MD        Subjective:     Principal Problem:Severe dehydration    Interval History:   Patient is lying on the bed, still putting out 5 L of fluid from the ileostomy  Review of Systems  Objective:     Vital Signs (Most Recent):  Temp: 98.2 °F (36.8 °C) (02/27/20 1100)  Pulse: 62 (02/27/20 1100)  Resp: 18 (02/27/20 1100)  BP: (!) 97/54 (02/27/20 1100)  SpO2: 98 % (02/27/20 1100) Vital Signs (24h Range):  Temp:  [96.9 °F (36.1 °C)-98.2 °F (36.8 °C)] 98.2 °F (36.8 °C)  Pulse:  [62-69] 62  Resp:  [16-18] 18  SpO2:  [91 %-98 %] 98 %  BP: ()/(54-84) 97/54     Weight: 50.8 kg (111 lb 14.1 oz)  Body mass index is 17.52 kg/m².    Intake/Output Summary (Last 24 hours) at 2/27/2020 1509  Last data filed at 2/27/2020 1300  Gross per 24 hour   Intake 3641.08 ml   Output 3850 ml   Net -208.92 ml      Physical Exam:  General- Patient alert and oriented x3 in NAD  HEENT- PERRLA, EOMI, OP clear, MMM  Neck- No JVD, Lymphadenopathy, Thyromegaly  CV- Regular rate and rhythm, No Murmur/jackie/rubs  Resp- Lungs CTA Bilaterally, No increased WOB  GI- Non tender BS normoactive x4 quads  Extrem- No cyanosis, clubbing, edema  Neuro- Strength 5/5 flexors/extensors,Skin-  No masses, rashes or lesions noted on cursory skin exam.    Overview/Hospital Course:     Significant Labs:   CBC:   Recent Labs   Lab 02/26/20  0420 02/27/20  0417   WBC 7.22 6.83   HGB 9.3* 8.0*   HCT 30.7* 26.8*    186     CMP:   Recent Labs   Lab 02/26/20  0420 02/27/20  0417    140   K 3.9 4.9   CL 84* 92*   CO2 44* 35*   GLU 69* 111*   BUN 36* 32*   CREATININE 5.0* 4.3*   CALCIUM 8.8 8.1*   ANIONGAP 14 13   EGFRNONAA 8.2* 9.9*       Significant Imaging: I have reviewed all pertinent  imaging results/findings within the past 24 hours.    Assessment/Plan:      Severe dehydration with hypotension  secondary to High output iliostomy;   Also possible Due to chemoradiation therapy induced chronic diarrhea worsened after ileostomy.     TPN and NPO , IVF    Metamucil ,Loperamide 4mg QID. , add codeine tab  protonix IV   Questran  Sandostatin  Follow-up GI, General surgery  Patient most likely going to need skilled nursing facility     Malnutrition with  weight loss of 20 lb in 1 week  Start  TPN     Lactic Acidosis secondary to dehydration , no sign of infection -resolved.C diff and blood cultures neg  Continue  antibiotics  given the cause could be intra-abdominal sepsis, enteritis    DARCI on CKD V:   Continue IV fluids high dose   Avoid nephrotoxins  Avoid NSAID, Ace inhibitors, arbs  Renal dose all medications      Vulvar cancer with skin excoriation due to XRT   wound care       Hypothyroidism:   continue Levothyroxine.     Anemia   Monitor and may need transfusion soon     Deconditioning:   Hypomagnesemia-resolved   DNR status        ________________________________________________________________________________          Active Diagnoses:    Diagnosis Date Noted POA    PRINCIPAL PROBLEM:  Severe dehydration due to chronic diarrhea/high output ileostomy [E86.0] 02/24/2020 Yes    DNR (do not resuscitate) [Z66] 02/24/2020 Yes    Electrolyte imbalance [E87.8] 02/24/2020 Yes    Severe malnutrition [E43] 02/24/2020 Yes    DARCI (acute kidney injury) [N17.9] 02/23/2020 Yes      Problems Resolved During this Admission:     VTE Risk Mitigation (From admission, onward)         Ordered     IP VTE HIGH RISK PATIENT  Once      02/23/20 2025     Reason for No Pharmacological VTE Prophylaxis  Once     Question:  Reasons:  Answer:  Risk of Bleeding    02/23/20 2025                   Aleksander Phan MD  Department of Hospital Medicine   [facility]

## 2020-02-27 NOTE — PROGRESS NOTES
[facility]  General Surgery  Progress Note    Subjective:     Interval History:  Patient is putting out around 5 L of output from the ostomy per day.  This is making it impossible to keep up by mouth.  IV fluids have been increased to 250 cc an hour.  Creatinine has bumped to 5.  Patient states that whenever she eats she can see under the un digested food coming from the ostomy.    Post-Op Info:  * No surgery found *          Medications:  Continuous Infusions:   amino acid 4.25 % in D5W 1,000 mL (02/26/20 1632)    lactated ringers 200 mL/hr at 02/27/20 0059    octreotide (SANDOSTATIN) infusion 25 mcg/hr (02/26/20 1007)     Scheduled Meds:   chlorhexidine  15 mL Mouth/Throat BID    cholestyramine-aspartame  1 packet Oral BID    dronabinol  2.5 mg Oral BID AC    levothyroxine  88 mcg Oral QAM    loperamide  4 mg Oral QID    magnesium oxide  400 mg Oral BID    meropenem (MERREM) IVPB  500 mg Intravenous Daily    mupirocin   Nasal BID    pantoprazole  40 mg Intravenous BID    potassium chloride 10%  40 mEq Oral BID    psyllium husk (with sugar)  2 packet Oral TID    sertraline  50 mg Oral Daily     PRN Meds:acetaminophen, calcium chloride IVPB, calcium chloride IVPB, calcium chloride IVPB, codeine, dextrose 50%, dextrose 50%, glucagon (human recombinant), glucose, glucose, HYDROcodone-acetaminophen, magnesium oxide, magnesium sulfate IVPB, magnesium sulfate IVPB, magnesium sulfate IVPB, magnesium sulfate IVPB, melatonin, morphine, ondansetron, potassium chloride in water, potassium chloride in water, potassium chloride in water, potassium chloride in water, potassium chloride, potassium chloride, potassium chloride, potassium chloride, sodium chloride 0.9%, sodium phosphate IVPB, sodium phosphate IVPB, sodium phosphate IVPB, sodium phosphate IVPB, sodium phosphate IVPB, Pharmacy to dose Vancomycin consult **AND** vancomycin - pharmacy to dose     Objective:     Vital Signs (Most  Recent):  Temp: 98 °F (36.7 °C) (02/27/20 0711)  Pulse: 68 (02/27/20 0711)  Resp: 18 (02/27/20 0711)  BP: 109/69 (02/27/20 0711)  SpO2: 97 % (02/27/20 0711) Vital Signs (24h Range):  Temp:  [96.9 °F (36.1 °C)-98 °F (36.7 °C)] 98 °F (36.7 °C)  Pulse:  [63-69] 68  Resp:  [16-18] 18  SpO2:  [91 %-97 %] 97 %  BP: ()/(59-84) 109/69       Intake/Output Summary (Last 24 hours) at 2/27/2020 0806  Last data filed at 2/27/2020 0700  Gross per 24 hour   Intake 4721.08 ml   Output 5900 ml   Net -1178.92 ml       Physical Exam   Constitutional: She is oriented to person, place, and time.   Pulmonary/Chest: Effort normal. No respiratory distress.   Abdominal: There is tenderness.       Ostomy is continually producing fluid.  The bag is hooked to suction and continues to empty the bag as it fills.    Abdomen is soft and appropriately tender.   Neurological: She is alert and oriented to person, place, and time.       Assessment/Plan:     Active Diagnoses:    Diagnosis Date Noted POA    PRINCIPAL PROBLEM:  Severe dehydration due to chronic diarrhea/high output ileostomy [E86.0] 02/24/2020 Yes    DNR (do not resuscitate) [Z66] 02/24/2020 Yes    Electrolyte imbalance [E87.8] 02/24/2020 Yes    Severe malnutrition [E43] 02/24/2020 Yes    DARCI (acute kidney injury) [N17.9] 02/23/2020 Yes      Problems Resolved During this Admission:     -patient losing too much output from the ostomy.  Will plan to close the ostomy at soon as possible date.  Usual recommendation is 12 weeks from creation of ostomy.  Will most likely push that 8 weeks or maybe 6 weeks and her case.  Until that time, it may benefit her to take in little to nothing by mouth which would decrease the ostomy output by quite a bit and start TPN until we can reverse the ostomy.     Robbi Lovell III, MD  General Surgery  [facility]

## 2020-02-27 NOTE — PLAN OF CARE
Problem: Malnutrition  Goal: Improved Nutritional Intake  Outcome: Ongoing, Progressing  Intervention: Promote and Optimize Nutrition Support  Flowsheets (Taken 2/27/2020 1320)  Nutrition Support Management: parenteral nutrition composition adjusted  Reodered Clinimix @ 50mL/hr to run over 24 hours.  Added 250 mL Lipid Emulsion to aid in meeting caloric needs.   PPN provides: 908 kcals, 51 g protein, and 56 g fat.

## 2020-02-27 NOTE — CONSULTS
Nephrology Consult Note        Patient Name: Shara Hutchins  MRN: [id number]    Patient Class: IP- Inpatient   Admission Date: 2/23/2020  Length of Stay: 2 days  Date of Service: 2/27/2020    Attending Physician: Aleksander Phan MD  Primary Care Provider: April Horton MD    Reason for Consult: darci/ckd3/hypovolemia/hypomagnesemia/shpt/anemia/htn    SUBJECTIVE:     HPI: 69F with HTN, DM2, hypothyroidism, secondary hyperparathyroidism, anemia, vulvar Ca (in remission), CKD3 presentswith abdominal pain, lightheadedness, vaginal bleeding. Had an ileostomy on 2/13, d/c on 2/17 (Dr. Meza) for chronic, debilitating diarrhea ? Pneumatosis intestinalis. Since then, she lost 20 lbs, has intermittent nausea and vomiting that is non-bloody and non-bilious, has high ostomy output - seeing whole pills at times. Pain to her abdominal incision site is worsening, had some lightheadedness when standing. Renal consulted for DARCI.    2/25 VSS, no new complains.  2/26 VSS, sCr worse today. UO+OO = > 5L today!!! Will have to step up IVF significantly to match loss + some....  2/27 VSS, remains to have high output from ostomy, agree with liquid diet and TPN as d/w Dr. Kearns, no PICC line but a tunneled Groshong, she is close to dialysis, has AVF in one arm...    Past Medical History:   Diagnosis Date    Chronic kidney disease, stage III (moderate) 1/7/2018    Diabetes mellitus, type 2     Fatigue     Hypertension     Iron deficiency anemia due to chronic blood loss 1/7/2018    Vulvar cancer 1/7/2019     Past Surgical History:   Procedure Laterality Date    ABDOMINAL SURGERY      BACK SURGERY      CERVICAL FUSION      x 4    cervix repair      CHOLECYSTECTOMY  2000    DIAGNOSTIC LAPAROSCOPY N/A 2/13/2020    Procedure: LAPAROSCOPY, DIAGNOSTIC;  Surgeon: Robbi Lovell III, MD;  Location: [facility];  Service: General;  Laterality: N/A;    HYSTERECTOMY  1980    ILEOSTOMY N/A 2/13/2020    Procedure: CREATION, ILEOSTOMY Loop;   Surgeon: Robbi Lovell III, MD;  Location: [facility];  Service: General;  Laterality: N/A;    LUMBAR LAMINECTOMY      LYSIS OF ADHESIONS N/A 2020    Procedure: LYSIS, ADHESIONS;  Surgeon: Robbi Lovell III, MD;  Location: [facility] OR;  Service: General;  Laterality: N/A;    NECK SURGERY      REMOVAL OF VASCULAR ACCESS PORT Right 2020    Procedure: REMOVAL, VASCULAR ACCESS PORT;  Surgeon: Robbi Lovell III, MD;  Location: [facility] OR;  Service: General;  Laterality: Right;    SHOULDER SURGERY      vulva cancer       Family History   Problem Relation Age of Onset    Heart disease Mother     Hypertension Mother     Cancer Mother         lung    Heart disease Father     Heart disease Sister         Open heart surgery    No Known Problems Daughter      Social History     Tobacco Use    Smoking status: Former Smoker     Packs/day: 1.00     Years: 33.00     Pack years: 33.00     Types: Cigarettes     Last attempt to quit: 2000     Years since quittin.6    Smokeless tobacco: Never Used   Substance Use Topics    Alcohol use: No    Drug use: No       Review of patient's allergies indicates:   Allergen Reactions    Ciprofloxacin Other (See Comments)     Elevated liver enzymes      Pcn [penicillins] Anaphylaxis       Outpatient meds:  No current facility-administered medications on file prior to encounter.      Current Outpatient Medications on File Prior to Encounter   Medication Sig Dispense Refill    calcitRIOL (ROCALTROL) 0.5 MCG Cap Take 1 capsule by mouth once daily.  3    dicyclomine (BENTYL) 10 MG capsule TAKE 1 CAPSULE THREE TIMES A DAY (Patient taking differently: Take 10 mg by mouth 3 (three) times daily. ) 270 capsule 4    diphenhydrAMINE (BENADRYL) 25 mg capsule Take 25 mg by mouth nightly as needed for Itching.      dronabinol (MARINOL) 2.5 MG capsule Take 1 capsule (2.5 mg total) by mouth 2 (two) times daily before meals. 60 capsule 0    HYDROcodone-acetaminophen  (NORCO)  mg per tablet Take 1 tablet by mouth every 6 (six) hours as needed for Pain. 30 tablet 0    levothyroxine (SYNTHROID) 88 MCG tablet TAKE 1 TABLET DAILY (Patient taking differently: Take 88 mcg by mouth every morning. ) 90 tablet 4    magnesium oxide (MAG-OX) 400 mg (241.3 mg magnesium) tablet Take 400 mg by mouth 2 (two) times daily.      potassium chloride (KLOR-CON) 10 MEQ TbSR Take 20 mEq by mouth 2 (two) times daily.       promethazine (PHENERGAN) 25 MG tablet Take 1 tablet (25 mg total) by mouth daily as needed for Nausea. 90 tablet 0    sertraline (ZOLOFT) 50 MG tablet Take 50 mg by mouth once daily.       sodium bicarbonate 650 MG tablet Take 650 mg by mouth 3 (three) times daily.   2       Scheduled meds:   chlorhexidine  15 mL Mouth/Throat BID    cholestyramine-aspartame  1 packet Oral BID    dronabinol  2.5 mg Oral BID AC    fat emulsion 20%  250 mL Intravenous Daily    levothyroxine  88 mcg Oral QAM    loperamide  4 mg Oral QID    magnesium oxide  400 mg Oral BID    meropenem (MERREM) IVPB  500 mg Intravenous Daily    mupirocin   Nasal BID    pantoprazole  40 mg Intravenous BID    potassium chloride 10%  40 mEq Oral BID    psyllium husk (with sugar)  2 packet Oral TID    sertraline  50 mg Oral Daily       Infusions:   amino acid 4.25 % in D5W      lactated ringers 200 mL/hr at 02/27/20 0059    octreotide (SANDOSTATIN) infusion 25 mcg/hr (02/26/20 2957)       PRN meds:  acetaminophen, calcium chloride IVPB, calcium chloride IVPB, calcium chloride IVPB, codeine, dextrose 50%, dextrose 50%, glucagon (human recombinant), glucose, glucose, HYDROcodone-acetaminophen, magnesium oxide, magnesium sulfate IVPB, magnesium sulfate IVPB, magnesium sulfate IVPB, magnesium sulfate IVPB, melatonin, morphine, ondansetron, potassium chloride in water, potassium chloride in water, potassium chloride in water, potassium chloride in water, potassium chloride, potassium chloride, potassium  chloride, potassium chloride, sodium chloride 0.9%, sodium phosphate IVPB, sodium phosphate IVPB, sodium phosphate IVPB, sodium phosphate IVPB, sodium phosphate IVPB, Pharmacy to dose Vancomycin consult **AND** vancomycin - pharmacy to dose    Review of Systems:  ROS    OBJECTIVE:     Vital Signs and IO (Last 24H):  Temp:  [96.9 °F (36.1 °C)-98.2 °F (36.8 °C)]   Pulse:  [62-69]   Resp:  [16-18]   BP: ()/(54-84)   SpO2:  [91 %-98 %]   I/O last 3 completed shifts:  In: 6871.1 [P.O.:2630; I.V.:3641.1]  Out: 8400 [Urine:1300; Stool:7100]    Wt Readings from Last 5 Encounters:   02/27/20 50.8 kg (111 lb 14.1 oz)   02/19/20 58.7 kg (129 lb 4.8 oz)   02/19/20 58.8 kg (129 lb 9.6 oz)   02/14/20 56.7 kg (125 lb)   02/05/20 56.9 kg (125 lb 6.4 oz)         Physical Exam:  Physical Exam   Constitutional: She is oriented to person, place, and time. She appears well-developed and well-nourished.   HENT:   Head: Normocephalic and atraumatic.   Mouth/Throat: Oropharynx is clear and moist.   Eyes: Pupils are equal, round, and reactive to light. EOM are normal. No scleral icterus.   Neck: Neck supple.   Cardiovascular: Normal rate and regular rhythm.   Pulmonary/Chest: Effort normal. No stridor. No respiratory distress.   Abdominal: Soft. She exhibits no distension.   Musculoskeletal: Normal range of motion. She exhibits no edema or deformity.   Neurological: She is alert and oriented to person, place, and time. No cranial nerve deficit.   Skin: Skin is warm and dry. No rash noted. She is not diaphoretic. No erythema.   Psychiatric: She has a normal mood and affect. Her behavior is normal.       Body mass index is 17.52 kg/m².    Laboratory:  Recent Labs   Lab 02/25/20  0325 02/26/20  0420 02/27/20  0417    142 140   K 3.3* 3.9 4.9   CL 87* 84* 92*   CO2 41* 44* 35*   BUN 37* 36* 32*   CREATININE 4.5* 5.0* 4.3*   ESTGFRAFRICA 10.8* 9.5* 11.4*   EGFRNONAA 9.4* 8.2* 9.9*   GLU 78 69* 111*       Recent Labs   Lab  02/23/20  1530 02/24/20  0350 02/25/20  0325 02/26/20  0420 02/27/20  0417   CALCIUM 9.0 8.1* 8.4* 8.8 8.1*   ALBUMIN 3.7 2.9*  --   --   --    PHOS  --   --   --  5.3* 4.7*   MG 1.5* 2.7* 2.4 2.3 1.8       Recent Labs   Lab 09/30/19  0820 11/15/19  0748   PTH, Intact 176.4 H 238.4 H       No results for input(s): POCTGLUCOSE in the last 168 hours.    Recent Labs   Lab 07/11/19  0806 11/15/19  0748 01/22/20  0848   Hemoglobin A1C 5.1 Invalid A SEE COMMENT       Recent Labs   Lab 02/25/20  0325 02/26/20  0420 02/27/20  0417   WBC 6.62 7.22 6.83   HGB 8.1* 9.3* 8.0*   HCT 26.2* 30.7* 26.8*    205 186   * 107* 109*   MCHC 30.9* 30.3* 29.9*   MONO 6.3  0.4 6.0  0.4 6.4  0.4       Recent Labs   Lab 02/23/20  1530 02/24/20  0350   BILITOT 1.9* 1.6*   PROT 6.1 4.7*   ALBUMIN 3.7 2.9*   ALKPHOS 231* 168*   ALT 25 20   AST 35 25       Recent Labs   Lab 02/10/20  2119 02/23/20  1715   Color, UA Yellow Yellow   Appearance, UA Hazy A Clear   pH, UA 6.0 >8.0 A   Specific [place], UA 1.010 1.010   Protein, UA Trace A 1+ A   Glucose, UA Negative Negative   Ketones, UA Negative Negative   Urobilinogen, UA Negative Negative   Bilirubin (UA) Negative Negative   Occult Blood UA 2+ A Negative   Nitrite, UA Negative Negative   RBC, UA 5 H 4   WBC, UA 38 H 19 H   Bacteria Negative Negative   Hyaline Casts, UA 29 A 19 A             Microbiology Results (last 7 days)     Procedure Component Value Units Date/Time    Blood culture #1 **CANNOT BE ORDERED STAT** [[id number]] Collected:  02/23/20 1530    Order Status:  Completed Specimen:  Blood from Peripheral, Upper Arm, Right Updated:  02/26/20 1632     Blood Culture, Routine No Growth to date      No Growth to date      No Growth to date      No Growth to date    Blood culture #2 **CANNOT BE ORDERED STAT** [[id number]] Collected:  02/23/20 1545    Order Status:  Completed Specimen:  Blood from Peripheral, Upper Arm, Right Updated:  02/26/20 1632     Blood Culture, Routine No  Growth to date      No Growth to date      No Growth to date      No Growth to date    Urine culture [[id number]] Collected:  02/23/20 1715    Order Status:  Completed Specimen:  Urine Updated:  02/26/20 0947     Urine Culture, Routine No growth    Narrative:       Preferred Collection Type->Urine, Clean Catch  Specimen Source->Urine    Clostridium difficile EIA [[id number]] Collected:  02/23/20 2124    Order Status:  Completed Specimen:  Stool Updated:  02/23/20 2329     C. diff Antigen Negative     C difficile Toxins A+B, EIA Negative     Comment: Testing not recommended for children <24 months old.       Narrative:       Recoll. [id number] by CD3 at 02/23/2020 20:57, reason: Container not   labeled          ASSESSMENT/PLAN:     Active Hospital Problems    Diagnosis  POA    *Severe dehydration due to chronic diarrhea/high output ileostomy [E86.0]  Yes    DNR (do not resuscitate) [Z66]  Yes    Electrolyte imbalance [E87.8]  Yes    Severe malnutrition [E43]  Yes    DARCI (acute kidney injury) [N17.9]  Yes      Resolved Hospital Problems   No resolved problems to display.     DARCI due to volume depletion from high ostomy output  CKD stage 3  Hypomagnesemia  Acidosis  No NSAIDs, ACEI/ARB, IV contrast or other nephrotoxins.  Keep MAP > 60, SBP > 100.  Dose meds for GFR < 30 ml/min.  Renal diet - low K, low phos.  Agree with IVF, replete electrolytes as needed.  Needs Groshong and TPN for several more weeks before closing the ostomy. Clear liquid diet or NPO based on output. D/w Dr. Kearns.  K is better, will keep KCL to 40 BID, liquid.    Anemia of CKD  Hgb and HCT are acceptable. Monitor.  Will provide ADAM and/or IV iron PRN.    MBD / Secondary HPT  PTH is high. Continue calcitriol.    HTN, now hypotension  BP seem controlled.   Tolerate asymptomatic HTN up to -160.  Hold home meds.    Thank you for allowing us to participate in the care of your patient!   We will follow the patient and provide  recommendations as needed.    Jorge Webb MD    [facility]  [address] [place] [address] [place] [address]    [phone] - tel  [phone] - fax    2/27/2020 10:50 AM

## 2020-02-27 NOTE — PT/OT/SLP PROGRESS
Physical Therapy Treatment    Patient Name:  Shara Hucthins   MRN:  [id number]    Recommendations:     Discharge Recommendations:  home health PT   Discharge Equipment Recommendations: walker, rolling   Barriers to discharge: None    Assessment:     Shara Hutchins is a 69 y.o. female admitted with a medical diagnosis of Severe dehydration.  She presents with the following impairments/functional limitations:  weakness, impaired endurance, impaired self care skills. Patient progressing with gait and mobility however gait distance continues to be limited 2/2 ileostomy being hooked to suction on wall in room. RN present at beginning of tx to unhook pt from IV. Pt without LOB or SOB during tx. Continue with PT and POC.    Rehab Prognosis: Good; patient would benefit from acute skilled PT services to address these deficits and reach maximum level of function.    Recent Surgery: * No surgery found *      Plan:     During this hospitalization, patient to be seen 6 x/week to address the identified rehab impairments via gait training, therapeutic activities, therapeutic exercises and progress toward the following goals:    · Plan of Care Expires:  03/24/20    Subjective     Chief Complaint: No complaints  Patient/Family Comments/goals:   Pain/Comfort:  · Pain Rating 1: 0/10      Objective:     Communicated with RN prior to session.  Patient found HOB elevated with colostomy, lee catheter, peripheral IV, telemetry, central line upon PT entry to room.     General Precautions: Standard, fall   Orthopedic Precautions:N/A   Braces:       Functional Mobility:  · Bed Mobility:     · Supine to Sit: supervision  · Transfers:     · Sit to Stand:  supervision with no AD  · Bed to Chair: contact guard assistance with  no AD  using  Step Transfer  · Gait: 25ft (in room) with no AD and CGA      AM-PAC 6 CLICK MOBILITY          Therapeutic Activities and Exercises:   bed mobility; sitting EOB for trunk control and midline orientation; sit <>  stands; transfer training    Patient left up in chair with all lines intact and call button in reach..    GOALS:   Multidisciplinary Problems     Physical Therapy Goals        Problem: Physical Therapy Goal    Goal Priority Disciplines Outcome Goal Variances Interventions   Physical Therapy Goal     PT, PT/OT Ongoing, Progressing     Description:  Goals to be met by: D/C     Patient will increase functional independence with mobility by performin. Supine to sit with Contact Guard Assistance  2. Sit to stand transfer with Contact Guard Assistance  3. Bed to chair transfer with Contact Guard Assistance using Rolling Walker  4. Gait  x 100 feet with Contact Guard Assistance using Rolling Walker.                       Time Tracking:     PT Received On: 20  PT Start Time: 1012     PT Stop Time: 1026  PT Total Time (min): 14 min     Billable Minutes: Gait Training 14    Treatment Type: Treatment  PT/PTA: PTA     PTA Visit Number: 2     Amanda Ayers PTA  2020

## 2020-02-27 NOTE — PT/OT/SLP PROGRESS
Occupational Therapy      Patient Name:  Shara Hutchins   MRN:  [id number]    Patient not seen today secondary to Patient fatigue. Will follow-up next service date.    Nithya Sterling OT  2/27/2020

## 2020-02-27 NOTE — PROGRESS NOTES
"[facility]  Adult Nutrition   Progress Note (Nutrition Support Management)    SUMMARY     Recommendations/Interventions:  1. Reodered Clinimix @ 50mL/hr to run over 24 hours.  2. Added 250 mL Lipid Emulsion to aid in meeting caloric needs.   1. PPN provides: 908 kcals, 51 g protein, and 56 g fat.   3. Once Central line placed, RD will order TPN.    Goals:  1. Patient to continue to tolerate PPN.   Nutrition Goal Status: new  Communication of RD Recs: reviewed with RN    Dietitian Rounds Brief:  · Consult for TPN.  did not want PICC line placed due to renal function. MD recommended central line placement-no current time frame noted for date of placement. Will continue Clinimix and lipids to provide 49% (lower end caloric range) of estimated caloric needs and 48% of estimated protein needs (upper end protein range). Will continue to monitor PPN tolerance and plan of care.     Reason for Assessment    Reason For Assessment: RD follow-up  Relevant Medical History: Vulvar cancer, CKD stage III (moderate), T2DM, fatigue, HTN, ileostomy (2/13/20), DARCI, severe dehydration due to chronic diarrhea/high output ileostomy, electrolyte imbalance  Interdisciplinary Rounds: attended    Nutrition Risk Screen    Nutrition Risk Screen: (severe weight loss, malabsorption )       Wound 02/23/20 2000 Moisture associated dermatitis medial Perineum #1-Wound Image: Images linked  MST Score: 2  Have you recently lost weight without trying?: Yes: 14-23 lbs  Weight loss score: 2  Have you been eating poorly because of a decreased appetite?: No  Appetite score: 0       Nutrition/Diet History    Patient Reported Diet/Restrictions/Preferences: general  Food Allergies: NKFA  Factors Affecting Nutritional Intake: decreased appetite, diarrhea, malabsorption, altered gastrointestinal function    Anthropometrics    Temp: 98.2 °F (36.8 °C)  Height Method: Stated  Height: 5' 7" (170.2 cm)  Height (inches): 67 in  Weight Method: " Bed Scale  Weight: 50.8 kg (111 lb 14.1 oz)  Weight (lb): 111.88 lb  Ideal Body Weight (IBW), Female: 135 lb  % Ideal Body Weight, Female (lb): 85.57 %  BMI (Calculated): 17.5  BMI Grade: 17 - 18.4 protein-energy malnutrition grade I  Weight Loss: unintentional  Usual Body Weight (UBW), k.8 kg  Weight Change Amount: 28 lb  % Usual Body Weight: 80.17  % Weight Change From Usual Weight: -20 %     Weight History:  Wt Readings from Last 10 Encounters:   20 50.8 kg (111 lb 14.1 oz)   20 58.7 kg (129 lb 4.8 oz)   20 58.8 kg (129 lb 9.6 oz)   20 56.7 kg (125 lb)   20 56.9 kg (125 lb 6.4 oz)   20 57.2 kg (126 lb)   20 61.4 kg (135 lb 6.4 oz)   19 59 kg (130 lb)   19 61.8 kg (136 lb 4.8 oz)   19 63.8 kg (140 lb 10.5 oz)     Lab/Procedures/Meds: Pertinent Labs Reviewed  Clinical Chemistry:  Recent Labs   Lab 20  1530 20  0350 20  0420 20  0417    139 142 140   K 3.9 3.6 3.9 4.9   CL 79* 85* 84* 92*   CO2 40* 41* 44* 35*    96 69* 111*   BUN 36* 33* 36* 32*   CREATININE 3.9* 3.8* 5.0* 4.3*   CALCIUM 9.0 8.1* 8.8 8.1*   PROT 6.1 4.7*  --   --    ALBUMIN 3.7 2.9*  --   --    BILITOT 1.9* 1.6*  --   --    ALKPHOS 231* 168*  --   --    AST 35 25  --   --    ALT 25 20  --   --    ANIONGAP 25* 13 14 13   ESTGFRAFRICA 12.8* 13.2* 9.5* 11.4*   EGFRNONAA 11.1* 11.5* 8.2* 9.9*   MG 1.5* 2.7* 2.3 1.8   PHOS  --   --  5.3* 4.7*   LIPASE 41  --   --   --     < > = values in this interval not displayed.     CBC:   Recent Labs   Lab 20  0417   WBC 6.83   RBC 2.45*   HGB 8.0*   HCT 26.8*      *   MCH 32.7*   MCHC 29.9*     Thyroid & Parathyroid:  Recent Labs   Lab 20  1530 20  1415   TSH 7.180*  --    FREET4 1.16  --    [id number]  --  7.3   H2VXUWH  --  44*     Medications: Pertinent Medications reviewed  Scheduled Meds:   chlorhexidine  15 mL Mouth/Throat BID    cholestyramine-aspartame  1 packet Oral BID     dronabinol  2.5 mg Oral BID AC    levothyroxine  88 mcg Oral QAM    loperamide  4 mg Oral QID    magnesium oxide  400 mg Oral BID    meropenem (MERREM) IVPB  500 mg Intravenous Daily    mupirocin   Nasal BID    pantoprazole  40 mg Intravenous BID    potassium chloride 10%  40 mEq Oral BID    psyllium husk (with sugar)  2 packet Oral TID    sertraline  50 mg Oral Daily     Continuous Infusions:   amino acid 4.25 % in D5W 1,000 mL (02/26/20 1632)    lactated ringers 200 mL/hr at 02/27/20 0059    octreotide (SANDOSTATIN) infusion 25 mcg/hr (02/26/20 2247)     PRN Meds:.acetaminophen, calcium chloride IVPB, calcium chloride IVPB, calcium chloride IVPB, codeine, dextrose 50%, dextrose 50%, glucagon (human recombinant), glucose, glucose, HYDROcodone-acetaminophen, magnesium oxide, magnesium sulfate IVPB, magnesium sulfate IVPB, magnesium sulfate IVPB, magnesium sulfate IVPB, melatonin, morphine, ondansetron, potassium chloride in water, potassium chloride in water, potassium chloride in water, potassium chloride in water, potassium chloride, potassium chloride, potassium chloride, potassium chloride, sodium chloride 0.9%, sodium phosphate IVPB, sodium phosphate IVPB, sodium phosphate IVPB, sodium phosphate IVPB, sodium phosphate IVPB, Pharmacy to dose Vancomycin consult **AND** vancomycin - pharmacy to dose    Estimated/Assessed Needs    Weight Used For Calorie Calculations: 52.4 kg (115 lb 8.3 oz)  Energy Calorie Requirements (kcal): 8068-2046 kcals/day (35-40 kcals/kg)  Energy Need Method: Kcal/kg  Protein Requirements:  g/day (1.5-2.0 g/kg)  Weight Used For Protein Calculations: 52.4 kg (115 lb 8.3 oz)  Fluid Requirements (mL): 1572 (30mL/kg) or to maintain hydration      Nutrition Prescription Ordered    Current Diet Order: NPO  Current Nutrition Support Formula Ordered: Clinimix 4.25/5  Oral Nutrition Supplement: NPO    Evaluation of Received Nutrient/Fluid Intake    Energy Calories Required: not  meeting needs  Protein Required: not meeting needs  Fluid Required: not meeting needs  Tolerance: not tolerating  % Intake of Estimated Energy Needs: 25 - 50 %  % Meal Intake: NPO    Intake/Output Summary (Last 24 hours) at 2/27/2020 1314  Last data filed at 2/27/2020 0700  Gross per 24 hour   Intake 3641.08 ml   Output 4700 ml   Net -1058.92 ml      Nutrition Risk    Level of Risk/Frequency of Follow-up: high     Monitor and Evaluation    Food and Nutrient Intake: parenteral nutrition intake  Food and Nutrient Adminstration: enteral and parenteral nutrition administration  Physical Activity and Function: nutrition-related ADLs and IADLs, factors affecting access to physical activity  Anthropometric Measurements: weight, weight change, body mass index  Biochemical Data, Medical Tests and Procedures: electrolyte and renal panel, inflammatory profile, lipid profile, gastrointestinal profile, glucose/endocrine profile  Nutrition-Focused Physical Findings: overall appearance     Malnutrition Assessment  Malnutrition Type: chronic illness  Nutrition Follow-Up    RD Follow-up?: Yes  Kitty Blanco RD 02/27/2020 1:20 PM

## 2020-02-28 ENCOUNTER — ANESTHESIA (OUTPATIENT)
Dept: SURGERY | Facility: HOSPITAL | Age: 70
DRG: 682 | End: 2020-02-28
Payer: MEDICARE

## 2020-02-28 ENCOUNTER — ANESTHESIA EVENT (OUTPATIENT)
Dept: SURGERY | Facility: HOSPITAL | Age: 70
DRG: 682 | End: 2020-02-28
Payer: MEDICARE

## 2020-02-28 ENCOUNTER — ANESTHESIA EVENT (OUTPATIENT)
Dept: CARDIOLOGY | Facility: HOSPITAL | Age: 70
DRG: 682 | End: 2020-02-28
Payer: MEDICARE

## 2020-02-28 ENCOUNTER — ANESTHESIA (OUTPATIENT)
Dept: CARDIOLOGY | Facility: HOSPITAL | Age: 70
DRG: 682 | End: 2020-02-28
Payer: MEDICARE

## 2020-02-28 LAB
ABO + RH BLD: NORMAL
ANION GAP SERPL CALC-SCNC: 8 MMOL/L (ref 8–16)
BACTERIA BLD CULT: NORMAL
BACTERIA BLD CULT: NORMAL
BASOPHILS # BLD AUTO: 0.03 K/UL (ref 0–0.2)
BASOPHILS NFR BLD: 0.4 % (ref 0–1.9)
BLD GP AB SCN CELLS X3 SERPL QL: NORMAL
BUN SERPL-MCNC: 36 MG/DL (ref 8–23)
CALCIUM SERPL-MCNC: 7.9 MG/DL (ref 8.7–10.5)
CHLORIDE SERPL-SCNC: 93 MMOL/L (ref 95–110)
CO2 SERPL-SCNC: 34 MMOL/L (ref 23–29)
CREAT SERPL-MCNC: 4.4 MG/DL (ref 0.5–1.4)
DIFFERENTIAL METHOD: ABNORMAL
EOSINOPHIL # BLD AUTO: 0.4 K/UL (ref 0–0.5)
EOSINOPHIL NFR BLD: 5.5 % (ref 0–8)
ERYTHROCYTE [DISTWIDTH] IN BLOOD BY AUTOMATED COUNT: 14 % (ref 11.5–14.5)
EST. GFR  (AFRICAN AMERICAN): 11.1 ML/MIN/1.73 M^2
EST. GFR  (NON AFRICAN AMERICAN): 9.6 ML/MIN/1.73 M^2
GLUCOSE SERPL-MCNC: 83 MG/DL (ref 70–110)
GLUCOSE SERPL-MCNC: 90 MG/DL (ref 70–110)
HCT VFR BLD AUTO: 26.4 % (ref 37–48.5)
HGB BLD-MCNC: 7.9 G/DL (ref 12–16)
IMM GRANULOCYTES # BLD AUTO: 0.04 K/UL (ref 0–0.04)
IMM GRANULOCYTES NFR BLD AUTO: 0.5 % (ref 0–0.5)
LYMPHOCYTES # BLD AUTO: 1.4 K/UL (ref 1–4.8)
LYMPHOCYTES NFR BLD: 17.6 % (ref 18–48)
MAGNESIUM SERPL-MCNC: 1.5 MG/DL (ref 1.6–2.6)
MCH RBC QN AUTO: 31.7 PG (ref 27–31)
MCHC RBC AUTO-ENTMCNC: 29.9 G/DL (ref 32–36)
MCV RBC AUTO: 106 FL (ref 82–98)
MONOCYTES # BLD AUTO: 0.4 K/UL (ref 0.3–1)
MONOCYTES NFR BLD: 4.7 % (ref 4–15)
NEUTROPHILS # BLD AUTO: 5.6 K/UL (ref 1.8–7.7)
NEUTROPHILS NFR BLD: 71.3 % (ref 38–73)
NRBC BLD-RTO: 0 /100 WBC
PLATELET # BLD AUTO: 183 K/UL (ref 150–350)
PMV BLD AUTO: 11.4 FL (ref 9.2–12.9)
POTASSIUM SERPL-SCNC: 4.4 MMOL/L (ref 3.5–5.1)
RBC # BLD AUTO: 2.49 M/UL (ref 4–5.4)
SODIUM SERPL-SCNC: 135 MMOL/L (ref 136–145)
TRIGL SERPL-MCNC: 97 MG/DL (ref 30–150)
VANCOMYCIN SERPL-MCNC: 14.5 UG/ML
WBC # BLD AUTO: 7.84 K/UL (ref 3.9–12.7)

## 2020-02-28 PROCEDURE — 75860 VEIN X-RAY NECK: CPT | Mod: 26,,, | Performed by: SURGERY

## 2020-02-28 PROCEDURE — 36000706: Performed by: SURGERY

## 2020-02-28 PROCEDURE — 27201107 HC STYLET, STANDARD: Performed by: ANESTHESIOLOGY

## 2020-02-28 PROCEDURE — 25000003 PHARM REV CODE 250: Performed by: INTERNAL MEDICINE

## 2020-02-28 PROCEDURE — 63600175 PHARM REV CODE 636 W HCPCS: Performed by: INTERNAL MEDICINE

## 2020-02-28 PROCEDURE — 71000039 HC RECOVERY, EACH ADD'L HOUR: Performed by: SURGERY

## 2020-02-28 PROCEDURE — 36561 PR INSERT TUNNELED CV CATH WITH PORT: ICD-10-PCS | Mod: 58,53,RT, | Performed by: SURGERY

## 2020-02-28 PROCEDURE — 27201423 OPTIME MED/SURG SUP & DEVICES STERILE SUPPLY: Performed by: SURGERY

## 2020-02-28 PROCEDURE — 86850 RBC ANTIBODY SCREEN: CPT

## 2020-02-28 PROCEDURE — 83735 ASSAY OF MAGNESIUM: CPT

## 2020-02-28 PROCEDURE — C9113 INJ PANTOPRAZOLE SODIUM, VIA: HCPCS | Performed by: INTERNAL MEDICINE

## 2020-02-28 PROCEDURE — 63600175 PHARM REV CODE 636 W HCPCS: Performed by: NURSE ANESTHETIST, CERTIFIED REGISTERED

## 2020-02-28 PROCEDURE — 71000033 HC RECOVERY, INTIAL HOUR: Performed by: SURGERY

## 2020-02-28 PROCEDURE — 37000009 HC ANESTHESIA EA ADD 15 MINS: Performed by: SURGERY

## 2020-02-28 PROCEDURE — 37000008 HC ANESTHESIA 1ST 15 MINUTES: Performed by: SURGERY

## 2020-02-28 PROCEDURE — 36561 INSERT TUNNELED CV CATH: CPT | Mod: 58,53,RT, | Performed by: SURGERY

## 2020-02-28 PROCEDURE — 94761 N-INVAS EAR/PLS OXIMETRY MLT: CPT

## 2020-02-28 PROCEDURE — 85025 COMPLETE CBC W/AUTO DIFF WBC: CPT

## 2020-02-28 PROCEDURE — S0077 INJECTION, CLINDAMYCIN PHOSP: HCPCS | Performed by: NURSE ANESTHETIST, CERTIFIED REGISTERED

## 2020-02-28 PROCEDURE — 36415 COLL VENOUS BLD VENIPUNCTURE: CPT

## 2020-02-28 PROCEDURE — 63600175 PHARM REV CODE 636 W HCPCS: Performed by: SURGERY

## 2020-02-28 PROCEDURE — 84478 ASSAY OF TRIGLYCERIDES: CPT

## 2020-02-28 PROCEDURE — 25000003 PHARM REV CODE 250: Performed by: STUDENT IN AN ORGANIZED HEALTH CARE EDUCATION/TRAINING PROGRAM

## 2020-02-28 PROCEDURE — 76937 US GUIDE VASCULAR ACCESS: CPT | Performed by: STUDENT IN AN ORGANIZED HEALTH CARE EDUCATION/TRAINING PROGRAM

## 2020-02-28 PROCEDURE — 27202107 HC XP QUATRO SENSOR: Performed by: ANESTHESIOLOGY

## 2020-02-28 PROCEDURE — 27000673 HC TUBING BLOOD Y: Performed by: ANESTHESIOLOGY

## 2020-02-28 PROCEDURE — 25500020 PHARM REV CODE 255: Performed by: SURGERY

## 2020-02-28 PROCEDURE — 86920 COMPATIBILITY TEST SPIN: CPT

## 2020-02-28 PROCEDURE — 25000003 PHARM REV CODE 250: Performed by: NURSE ANESTHETIST, CERTIFIED REGISTERED

## 2020-02-28 PROCEDURE — 63600175 PHARM REV CODE 636 W HCPCS: Performed by: STUDENT IN AN ORGANIZED HEALTH CARE EDUCATION/TRAINING PROGRAM

## 2020-02-28 PROCEDURE — 75860 PR  VENOGRAM SINUS/JUGULAR: ICD-10-PCS | Mod: 26,,, | Performed by: SURGERY

## 2020-02-28 PROCEDURE — 80202 ASSAY OF VANCOMYCIN: CPT

## 2020-02-28 PROCEDURE — 25000003 PHARM REV CODE 250: Performed by: SURGERY

## 2020-02-28 PROCEDURE — 80048 BASIC METABOLIC PNL TOTAL CA: CPT

## 2020-02-28 PROCEDURE — 21400001 HC TELEMETRY ROOM

## 2020-02-28 PROCEDURE — 36000707: Performed by: SURGERY

## 2020-02-28 RX ORDER — HYDROCODONE BITARTRATE AND ACETAMINOPHEN 500; 5 MG/1; MG/1
TABLET ORAL
Status: DISCONTINUED | OUTPATIENT
Start: 2020-02-28 | End: 2020-03-06 | Stop reason: HOSPADM

## 2020-02-28 RX ORDER — MEPERIDINE HYDROCHLORIDE 50 MG/ML
12.5 INJECTION INTRAMUSCULAR; INTRAVENOUS; SUBCUTANEOUS EVERY 10 MIN PRN
Status: ACTIVE | OUTPATIENT
Start: 2020-02-28 | End: 2020-02-28

## 2020-02-28 RX ORDER — OXYCODONE HYDROCHLORIDE 5 MG/1
5 TABLET ORAL
Status: DISCONTINUED | OUTPATIENT
Start: 2020-02-28 | End: 2020-02-29

## 2020-02-28 RX ORDER — EPHEDRINE SULFATE 50 MG/ML
INJECTION, SOLUTION INTRAVENOUS
Status: DISCONTINUED | OUTPATIENT
Start: 2020-02-28 | End: 2020-02-28

## 2020-02-28 RX ORDER — LIDOCAINE HYDROCHLORIDE 20 MG/ML
INJECTION, SOLUTION EPIDURAL; INFILTRATION; INTRACAUDAL; PERINEURAL
Status: DISCONTINUED | OUTPATIENT
Start: 2020-02-28 | End: 2020-02-28

## 2020-02-28 RX ORDER — SODIUM CHLORIDE 9 MG/ML
INJECTION, SOLUTION INTRAVENOUS CONTINUOUS PRN
Status: DISCONTINUED | OUTPATIENT
Start: 2020-02-28 | End: 2020-02-28

## 2020-02-28 RX ORDER — DIPHENHYDRAMINE HYDROCHLORIDE 50 MG/ML
INJECTION INTRAMUSCULAR; INTRAVENOUS
Status: DISCONTINUED | OUTPATIENT
Start: 2020-02-28 | End: 2020-02-28

## 2020-02-28 RX ORDER — SODIUM CHLORIDE 0.9 % (FLUSH) 0.9 %
10 SYRINGE (ML) INJECTION
Status: DISCONTINUED | OUTPATIENT
Start: 2020-02-28 | End: 2020-03-06 | Stop reason: HOSPADM

## 2020-02-28 RX ORDER — LIDOCAINE HYDROCHLORIDE 10 MG/ML
INJECTION, SOLUTION EPIDURAL; INFILTRATION; INTRACAUDAL; PERINEURAL
Status: COMPLETED | OUTPATIENT
Start: 2020-02-28 | End: 2020-02-28

## 2020-02-28 RX ORDER — BUPIVACAINE HYDROCHLORIDE AND EPINEPHRINE 2.5; 5 MG/ML; UG/ML
INJECTION, SOLUTION EPIDURAL; INFILTRATION; INTRACAUDAL; PERINEURAL
Status: DISCONTINUED | OUTPATIENT
Start: 2020-02-28 | End: 2020-02-28 | Stop reason: HOSPADM

## 2020-02-28 RX ORDER — ONDANSETRON 2 MG/ML
4 INJECTION INTRAMUSCULAR; INTRAVENOUS DAILY PRN
Status: DISCONTINUED | OUTPATIENT
Start: 2020-02-28 | End: 2020-02-29

## 2020-02-28 RX ORDER — MIDAZOLAM HYDROCHLORIDE 1 MG/ML
INJECTION INTRAMUSCULAR; INTRAVENOUS
Status: DISCONTINUED | OUTPATIENT
Start: 2020-02-28 | End: 2020-02-28

## 2020-02-28 RX ORDER — SUCCINYLCHOLINE CHLORIDE 20 MG/ML
INJECTION INTRAMUSCULAR; INTRAVENOUS
Status: DISCONTINUED | OUTPATIENT
Start: 2020-02-28 | End: 2020-02-28

## 2020-02-28 RX ORDER — DIPHENHYDRAMINE HYDROCHLORIDE 50 MG/ML
12.5 INJECTION INTRAMUSCULAR; INTRAVENOUS
Status: DISCONTINUED | OUTPATIENT
Start: 2020-02-28 | End: 2020-02-29

## 2020-02-28 RX ORDER — HEPARIN SODIUM 1000 [USP'U]/ML
INJECTION, SOLUTION INTRAVENOUS; SUBCUTANEOUS
Status: DISCONTINUED | OUTPATIENT
Start: 2020-02-28 | End: 2020-02-28 | Stop reason: HOSPADM

## 2020-02-28 RX ORDER — FENTANYL CITRATE 50 UG/ML
INJECTION, SOLUTION INTRAMUSCULAR; INTRAVENOUS
Status: DISCONTINUED | OUTPATIENT
Start: 2020-02-28 | End: 2020-02-28

## 2020-02-28 RX ORDER — ROCURONIUM BROMIDE 10 MG/ML
INJECTION, SOLUTION INTRAVENOUS
Status: DISCONTINUED | OUTPATIENT
Start: 2020-02-28 | End: 2020-02-28

## 2020-02-28 RX ORDER — PROPOFOL 10 MG/ML
VIAL (ML) INTRAVENOUS
Status: DISCONTINUED | OUTPATIENT
Start: 2020-02-28 | End: 2020-02-28

## 2020-02-28 RX ORDER — CLINDAMYCIN PHOSPHATE 900 MG/50ML
INJECTION, SOLUTION INTRAVENOUS
Status: DISCONTINUED | OUTPATIENT
Start: 2020-02-28 | End: 2020-02-28

## 2020-02-28 RX ORDER — HYDROCODONE BITARTRATE AND ACETAMINOPHEN 500; 5 MG/1; MG/1
TABLET ORAL
Status: DISCONTINUED | OUTPATIENT
Start: 2020-02-28 | End: 2020-02-28

## 2020-02-28 RX ORDER — ONDANSETRON 2 MG/ML
INJECTION INTRAMUSCULAR; INTRAVENOUS
Status: DISCONTINUED | OUTPATIENT
Start: 2020-02-28 | End: 2020-02-28

## 2020-02-28 RX ORDER — HYDROMORPHONE HYDROCHLORIDE 1 MG/ML
0.2 INJECTION, SOLUTION INTRAMUSCULAR; INTRAVENOUS; SUBCUTANEOUS
Status: DISCONTINUED | OUTPATIENT
Start: 2020-02-28 | End: 2020-02-29

## 2020-02-28 RX ADMIN — FENTANYL CITRATE 50 MCG: 50 INJECTION INTRAMUSCULAR; INTRAVENOUS at 09:02

## 2020-02-28 RX ADMIN — ROCURONIUM BROMIDE 5 MG: 10 INJECTION, SOLUTION INTRAVENOUS at 09:02

## 2020-02-28 RX ADMIN — MAGNESIUM OXIDE 800 MG: 400 TABLET ORAL at 06:02

## 2020-02-28 RX ADMIN — MUPIROCIN: 20 OINTMENT TOPICAL at 11:02

## 2020-02-28 RX ADMIN — LIDOCAINE HYDROCHLORIDE 20 MG: 10 INJECTION, SOLUTION EPIDURAL; INFILTRATION; INTRACAUDAL; PERINEURAL at 08:02

## 2020-02-28 RX ADMIN — MORPHINE SULFATE 2 MG: 2 INJECTION, SOLUTION INTRAMUSCULAR; INTRAVENOUS at 11:02

## 2020-02-28 RX ADMIN — EPHEDRINE SULFATE 5 MG: 50 INJECTION, SOLUTION INTRAVENOUS at 10:02

## 2020-02-28 RX ADMIN — MORPHINE SULFATE 2 MG: 2 INJECTION, SOLUTION INTRAMUSCULAR; INTRAVENOUS at 02:02

## 2020-02-28 RX ADMIN — EPHEDRINE SULFATE 10 MG: 50 INJECTION, SOLUTION INTRAVENOUS at 09:02

## 2020-02-28 RX ADMIN — OCTREOTIDE ACETATE 25 MCG/HR: 500 INJECTION, SOLUTION INTRAVENOUS; SUBCUTANEOUS at 05:02

## 2020-02-28 RX ADMIN — ONDANSETRON 4 MG: 2 INJECTION INTRAMUSCULAR; INTRAVENOUS at 09:02

## 2020-02-28 RX ADMIN — EPHEDRINE SULFATE 5 MG: 50 INJECTION, SOLUTION INTRAVENOUS at 09:02

## 2020-02-28 RX ADMIN — MAGNESIUM SULFATE 2 G: 2 INJECTION INTRAVENOUS at 11:02

## 2020-02-28 RX ADMIN — EPHEDRINE SULFATE 15 MG: 50 INJECTION, SOLUTION INTRAVENOUS at 09:02

## 2020-02-28 RX ADMIN — MIDAZOLAM HYDROCHLORIDE 1 MG: 1 INJECTION, SOLUTION INTRAMUSCULAR; INTRAVENOUS at 09:02

## 2020-02-28 RX ADMIN — EPHEDRINE SULFATE 10 MG: 50 INJECTION, SOLUTION INTRAVENOUS at 10:02

## 2020-02-28 RX ADMIN — LEVOTHYROXINE SODIUM 88 MCG: 88 TABLET ORAL at 06:02

## 2020-02-28 RX ADMIN — CLINDAMYCIN IN 5 PERCENT DEXTROSE 900 MG: 18 INJECTION, SOLUTION INTRAVENOUS at 09:02

## 2020-02-28 RX ADMIN — SODIUM CHLORIDE: 9 INJECTION, SOLUTION INTRAVENOUS at 09:02

## 2020-02-28 RX ADMIN — DIPHENHYDRAMINE HYDROCHLORIDE 10 MG: 50 INJECTION, SOLUTION INTRAMUSCULAR; INTRAVENOUS at 09:02

## 2020-02-28 RX ADMIN — SODIUM CHLORIDE, POTASSIUM CHLORIDE, SODIUM LACTATE AND CALCIUM CHLORIDE: 600; 310; 30; 20 INJECTION, SOLUTION INTRAVENOUS at 01:02

## 2020-02-28 RX ADMIN — PANTOPRAZOLE SODIUM 40 MG: 40 INJECTION, POWDER, LYOPHILIZED, FOR SOLUTION INTRAVENOUS at 11:02

## 2020-02-28 RX ADMIN — PROPOFOL 90 MG: 10 INJECTION, EMULSION INTRAVENOUS at 09:02

## 2020-02-28 RX ADMIN — SUCCINYLCHOLINE CHLORIDE 140 MG: 20 INJECTION, SOLUTION INTRAMUSCULAR; INTRAVENOUS at 09:02

## 2020-02-28 RX ADMIN — DRONABINOL 2.5 MG: 2.5 CAPSULE ORAL at 06:02

## 2020-02-28 RX ADMIN — ONDANSETRON HYDROCHLORIDE 4 MG: 2 SOLUTION INTRAMUSCULAR; INTRAVENOUS at 11:02

## 2020-02-28 RX ADMIN — LEUCINE, PHENYLALANINE, LYSINE, METHIONINE, ISOLEUCINE, VALINE, HISTIDINE, THREONINE, TRYPTOPHAN, ALANINE, GLYCINE, ARGININE, PROLINE, SERINE, TYROSINE, DEXTROSE 1000 ML: 311; 238; 247; 170; 255; 247; 204; 179; 77; 880; 438; 489; 289; 213; 17; 5 INJECTION INTRAVENOUS at 05:02

## 2020-02-28 RX ADMIN — SODIUM CHLORIDE, POTASSIUM CHLORIDE, SODIUM LACTATE AND CALCIUM CHLORIDE: 600; 310; 30; 20 INJECTION, SOLUTION INTRAVENOUS at 06:02

## 2020-02-28 RX ADMIN — LIDOCAINE HYDROCHLORIDE 100 MG: 20 INJECTION, SOLUTION EPIDURAL; INFILTRATION; INTRACAUDAL; PERINEURAL at 09:02

## 2020-02-28 NOTE — OP NOTE
Surgery Date: 2/28/2020     Surgeon(s) and Role:     * Robbi Lovell III, MD - Primary    Assisting Surgeon: None    Pre-op Diagnosis:  Severe malnutrition [E43]  Venous insufficiency [I87.2]  Electrolyte imbalance [E87.8]  Severe dehydration [E86.0]    Post-op Diagnosis:  Post-Op Diagnosis Codes:     * Severe malnutrition [E43]     * Venous insufficiency [I87.2]     * Electrolyte imbalance [E87.8]     * Severe dehydration [E86.0]  Central venous stenosis right sided system     Procedure(s) (LRB):  Attempted Insertion,central venous access device (Right) - subclavian vein access successful with access needle.  Wire would only thread to the opposite IJ.  Venogram performed through a 5 Polish introducer sheath    Anesthesia: General    Description of Procedure:  Patient brought to the operating room and given general anesthesia and intubated.  Neck and chest was prepped and draped.  The I 1st used the ultrasound to identify the right internal jugular vein. It was filled with debris consistent with clot from the clavicle up to near the angle of the jaw.  Patient has a left-sided AV fistula that we are trying to save.  It was recommended not to try the left side and so I performed percutaneous stick to the right subclavian.  She had a Port-A-Cath in place here for many years that was removed earlier this year.  I was able to easily stick the subclavian the wire did thread.  Under fluoroscopy the wire was seen going across midline into the opposite IJ.  I tried to maneuver the wire into the superior vena cava but was unsuccessful.  I then placed a 5 Polish introducer sheath over the wire into the subclavian vein.  A venogram was performed.  There appeared to be flow into the superior vena cava but through a very tight stenosis.  The left IJ was not attempted.  Patient was extubated and brought to recovery room in stable condition. Instrument counts correct     Description of the findings of the procedure:  Right IJ  clot.  Right-sided central venous stenosis    Estimated Blood Loss: 10 mL    Will discuss with vascular surgery to see if they can offer any other option. She will need TPN for several weeks. If central line cannot be placed, will be on PPN via peripheral IV's.  The femoral is a potential site but central line would need to be changed weekly.          Specimens: none  Specimen (12h ago, onward)    None

## 2020-02-28 NOTE — PROGRESS NOTES
Formerly [facility], [facility]–1983  General Surgery  Progress Note    Subjective:     Interval History:  Ostomy output is decreased after being made NPO.  She has no new complaints today.  Creatinine a little improved.     Post-Op Info:  Procedure(s) (LRB):  Insertion,central venous access device (N/A)          Medications:  Continuous Infusions:   amino acid 4.25 % in D5W 1,000 mL (02/27/20 1528)    lactated ringers 200 mL/hr at 02/27/20 1905    octreotide (SANDOSTATIN) infusion 25 mcg/hr (02/27/20 1854)     Scheduled Meds:   chlorhexidine  15 mL Mouth/Throat BID    cholestyramine-aspartame  1 packet Oral BID    dronabinol  2.5 mg Oral BID AC    fat emulsion 20%  250 mL Intravenous Daily    levothyroxine  88 mcg Oral QAM    loperamide  4 mg Oral QID    magnesium oxide  400 mg Oral BID    meropenem (MERREM) IVPB  500 mg Intravenous Daily    mupirocin   Nasal BID    pantoprazole  40 mg Intravenous BID    potassium chloride 10%  40 mEq Oral BID    psyllium husk (with sugar)  2 packet Oral TID    sertraline  50 mg Oral Daily     PRN Meds:acetaminophen, calcium chloride IVPB, calcium chloride IVPB, calcium chloride IVPB, codeine, dextrose 50%, dextrose 50%, glucagon (human recombinant), glucose, glucose, HYDROcodone-acetaminophen, magnesium oxide, magnesium sulfate IVPB, magnesium sulfate IVPB, magnesium sulfate IVPB, magnesium sulfate IVPB, melatonin, morphine, ondansetron, potassium chloride in water, potassium chloride in water, potassium chloride in water, potassium chloride in water, potassium chloride, potassium chloride, potassium chloride, potassium chloride, sodium chloride 0.9%, sodium phosphate IVPB, sodium phosphate IVPB, sodium phosphate IVPB, sodium phosphate IVPB, sodium phosphate IVPB, Pharmacy to dose Vancomycin consult **AND** vancomycin - pharmacy to dose     Objective:     Vital Signs (Most Recent):  Temp: 98.3 °F (36.8 °C) (02/27/20 1900)  Pulse: 64 (02/27/20 1500)  Resp: 18 (02/27/20  1900)  BP: (!) 118/59 (02/27/20 1900)  SpO2: 96 % (02/27/20 1900) Vital Signs (24h Range):  Temp:  [98 °F (36.7 °C)-98.3 °F (36.8 °C)] 98.3 °F (36.8 °C)  Pulse:  [62-68] 64  Resp:  [18] 18  SpO2:  [96 %-98 %] 96 %  BP: ()/(54-84) 118/59       Intake/Output Summary (Last 24 hours) at 2/27/2020 2252  Last data filed at 2/27/2020 1905  Gross per 24 hour   Intake 3641.08 ml   Output 3050 ml   Net 591.08 ml       Physical Exam   Constitutional: She is oriented to person, place, and time.   Pulmonary/Chest: Effort normal. No respiratory distress.   Abdominal: There is tenderness. There is no rigidity, no rebound and no guarding.         Abdomen is soft and appropriately tender.    Ostomy output is liquid.  may be a little thicker than previous.   Neurological: She is alert and oriented to person, place, and time.       Significant Labs:  CBC:   Recent Labs   Lab 02/27/20 0417   WBC 6.83   RBC 2.45*   HGB 8.0*   HCT 26.8*      *   MCH 32.7*   MCHC 29.9*     CMP:   Recent Labs   Lab 02/27/20 0417   *   CALCIUM 8.1*      K 4.9   CO2 35*   CL 92*   BUN 32*   CREATININE 4.3*         Assessment/Plan:     Active Diagnoses:    Diagnosis Date Noted POA    PRINCIPAL PROBLEM:  Severe dehydration due to chronic diarrhea/high output ileostomy [E86.0] 02/24/2020 Yes    DNR (do not resuscitate) [Z66] 02/24/2020 Yes    Electrolyte imbalance [E87.8] 02/24/2020 Yes    Severe malnutrition [E43] 02/24/2020 Yes    DARCI (acute kidney injury) [N17.9] 02/23/2020 Yes      Problems Resolved During this Admission:     -plan for very little by mouth with TPN for the next several weeks until we can reverse the ostomy.  Will plan on Groshong placement tomorrow in the operating room for TPN.  The risks and benefits of the procedure were discussed with the patient.    Robbi Lovell III, MD  General Surgery  [facility]

## 2020-02-28 NOTE — PLAN OF CARE
02/28/20 1430   Patient Assessment/Suction   Level of Consciousness (AVPU) alert   Respiratory Effort Normal;Unlabored   Expansion/Accessory Muscles/Retractions no use of accessory muscles   PRE-TX-O2   O2 Device (Oxygen Therapy) room air   SpO2 98 %   Pulse Oximetry Type Intermittent   $ Pulse Oximetry - Multiple Charge Pulse Oximetry - Multiple   Pulse 78   Resp 18

## 2020-02-28 NOTE — CONSULTS
Nephrology Consult Note        Patient Name: Shara Hutchins  MRN: [id number]    Patient Class: IP- Inpatient   Admission Date: 2/23/2020  Length of Stay: 3 days  Date of Service: 2/28/2020    Attending Physician: Aleksander Phan MD  Primary Care Provider: April Horton MD    Reason for Consult: darci/ckd3/hypovolemia/hypomagnesemia/shpt/anemia/htn    SUBJECTIVE:     HPI: 69F with HTN, DM2, hypothyroidism, secondary hyperparathyroidism, anemia, vulvar Ca (in remission), CKD3 presentswith abdominal pain, lightheadedness, vaginal bleeding. Had an ileostomy on 2/13, d/c on 2/17 (Dr. Meza) for chronic, debilitating diarrhea ? Pneumatosis intestinalis. Since then, she lost 20 lbs, has intermittent nausea and vomiting that is non-bloody and non-bilious, has high ostomy output - seeing whole pills at times. Pain to her abdominal incision site is worsening, had some lightheadedness when standing. Renal consulted for DARCI.    2/25 VSS, no new complains.  2/26 VSS, sCr worse today. UO+OO = > 5L today!!! Will have to step up IVF significantly to match loss + some....  2/27 VSS, remains to have high output from ostomy, agree with liquid diet and TPN as d/w Dr. Kearns, no PICC line but a tunneled Groshong, she is close to dialysis, has AVF in one arm...  2/28 VSS, ostomy output is down and UO is up, agree with liquid diet and TPN as d/w Dr. Kearns, no PICC line but a tunneled Groshong, she is close to dialysis, has AVF in one arm...    Past Medical History:   Diagnosis Date    Chronic kidney disease, stage III (moderate) 1/7/2018    Diabetes mellitus, type 2     Fatigue     Hypertension     Iron deficiency anemia due to chronic blood loss 1/7/2018    Vulvar cancer 1/7/2019     Past Surgical History:   Procedure Laterality Date    ABDOMINAL SURGERY      BACK SURGERY      CERVICAL FUSION      x 4    cervix repair      CHOLECYSTECTOMY  2000    DIAGNOSTIC LAPAROSCOPY N/A 2/13/2020    Procedure: LAPAROSCOPY, DIAGNOSTIC;   Surgeon: Robbi Lovell III, MD;  Location: [facility] OR;  Service: General;  Laterality: N/A;    HYSTERECTOMY  1980    ILEOSTOMY N/A 2020    Procedure: CREATION, ILEOSTOMY Loop;  Surgeon: Robbi Lovell III, MD;  Location: [facility] OR;  Service: General;  Laterality: N/A;    LUMBAR LAMINECTOMY      LYSIS OF ADHESIONS N/A 2020    Procedure: LYSIS, ADHESIONS;  Surgeon: Robbi Lovell III, MD;  Location: [facility] OR;  Service: General;  Laterality: N/A;    NECK SURGERY      REMOVAL OF VASCULAR ACCESS PORT Right 2020    Procedure: REMOVAL, VASCULAR ACCESS PORT;  Surgeon: Robbi Lovell III, MD;  Location: [facility] OR;  Service: General;  Laterality: Right;    SHOULDER SURGERY      vulva cancer       Family History   Problem Relation Age of Onset    Heart disease Mother     Hypertension Mother     Cancer Mother         lung    Heart disease Father     Heart disease Sister         Open heart surgery    No Known Problems Daughter      Social History     Tobacco Use    Smoking status: Former Smoker     Packs/day: 1.00     Years: 33.00     Pack years: 33.00     Types: Cigarettes     Last attempt to quit: 2000     Years since quittin.6    Smokeless tobacco: Never Used   Substance Use Topics    Alcohol use: No    Drug use: No       Review of patient's allergies indicates:   Allergen Reactions    Ciprofloxacin Other (See Comments)     Elevated liver enzymes      Pcn [penicillins] Anaphylaxis       Outpatient meds:  No current facility-administered medications on file prior to encounter.      Current Outpatient Medications on File Prior to Encounter   Medication Sig Dispense Refill    calcitRIOL (ROCALTROL) 0.5 MCG Cap Take 1 capsule by mouth once daily.  3    dicyclomine (BENTYL) 10 MG capsule TAKE 1 CAPSULE THREE TIMES A DAY (Patient taking differently: Take 10 mg by mouth 3 (three) times daily. ) 270 capsule 4    diphenhydrAMINE (BENADRYL) 25 mg capsule Take 25 mg by mouth  nightly as needed for Itching.      dronabinol (MARINOL) 2.5 MG capsule Take 1 capsule (2.5 mg total) by mouth 2 (two) times daily before meals. 60 capsule 0    HYDROcodone-acetaminophen (NORCO)  mg per tablet Take 1 tablet by mouth every 6 (six) hours as needed for Pain. 30 tablet 0    levothyroxine (SYNTHROID) 88 MCG tablet TAKE 1 TABLET DAILY (Patient taking differently: Take 88 mcg by mouth every morning. ) 90 tablet 4    magnesium oxide (MAG-OX) 400 mg (241.3 mg magnesium) tablet Take 400 mg by mouth 2 (two) times daily.      potassium chloride (KLOR-CON) 10 MEQ TbSR Take 20 mEq by mouth 2 (two) times daily.       promethazine (PHENERGAN) 25 MG tablet Take 1 tablet (25 mg total) by mouth daily as needed for Nausea. 90 tablet 0    sertraline (ZOLOFT) 50 MG tablet Take 50 mg by mouth once daily.       sodium bicarbonate 650 MG tablet Take 650 mg by mouth 3 (three) times daily.   2       Scheduled meds:   chlorhexidine  15 mL Mouth/Throat BID    cholestyramine-aspartame  1 packet Oral BID    dronabinol  2.5 mg Oral BID AC    fat emulsion 20%  250 mL Intravenous Daily    levothyroxine  88 mcg Oral QAM    loperamide  4 mg Oral QID    magnesium oxide  400 mg Oral BID    meropenem (MERREM) IVPB  500 mg Intravenous Daily    mupirocin   Nasal BID    pantoprazole  40 mg Intravenous BID    potassium chloride 10%  40 mEq Oral BID    psyllium husk (with sugar)  2 packet Oral TID    sertraline  50 mg Oral Daily    vancomycin (VANCOCIN) IVPB  750 mg Intravenous Once       Infusions:   amino acid 4.25 % in D5W 1,000 mL (02/27/20 1528)    amino acid 4.25 % in D5W      lactated ringers 200 mL/hr at 02/28/20 0615    octreotide (SANDOSTATIN) infusion 25 mcg/hr (02/27/20 8574)       PRN meds:  sodium chloride, acetaminophen, calcium chloride IVPB, calcium chloride IVPB, calcium chloride IVPB, codeine, dextrose 50%, dextrose 50%, diphenhydrAMINE, glucagon (human recombinant), glucose, glucose,  HYDROcodone-acetaminophen, HYDROmorphone, magnesium oxide, magnesium sulfate IVPB, magnesium sulfate IVPB, magnesium sulfate IVPB, magnesium sulfate IVPB, melatonin, meperidine, morphine, ondansetron, ondansetron, oxyCODONE, potassium chloride in water, potassium chloride in water, potassium chloride in water, potassium chloride in water, potassium chloride, potassium chloride, potassium chloride, potassium chloride, promethazine (PHENERGAN) IVPB, sodium chloride 0.9%, sodium chloride 0.9%, sodium phosphate IVPB, sodium phosphate IVPB, sodium phosphate IVPB, sodium phosphate IVPB, sodium phosphate IVPB, Pharmacy to dose Vancomycin consult **AND** vancomycin - pharmacy to dose    Review of Systems:  ROS    OBJECTIVE:     Vital Signs and IO (Last 24H):  Temp:  [97 °F (36.1 °C)-98.3 °F (36.8 °C)]   Pulse:  [64-97]   Resp:  [14-23]   BP: ()/(55-83)   SpO2:  [94 %-100 %]   I/O last 3 completed shifts:  In: 7334 [P.O.:1000; I.V.:4901.1]  Out: 5750 [Urine:2200; Stool:3550]    Wt Readings from Last 5 Encounters:   02/28/20 50 kg (110 lb 3.2 oz)   02/19/20 58.7 kg (129 lb 4.8 oz)   02/19/20 58.8 kg (129 lb 9.6 oz)   02/14/20 56.7 kg (125 lb)   02/05/20 56.9 kg (125 lb 6.4 oz)         Physical Exam:  Physical Exam   Constitutional: She is oriented to person, place, and time. She appears well-developed and well-nourished.   HENT:   Head: Normocephalic and atraumatic.   Mouth/Throat: Oropharynx is clear and moist.   Eyes: Pupils are equal, round, and reactive to light. EOM are normal. No scleral icterus.   Neck: Neck supple.   Cardiovascular: Normal rate and regular rhythm.   Pulmonary/Chest: Effort normal. No stridor. No respiratory distress.   Abdominal: Soft. She exhibits no distension.   Musculoskeletal: Normal range of motion. She exhibits no edema or deformity.   Neurological: She is alert and oriented to person, place, and time. No cranial nerve deficit.   Skin: Skin is warm and dry. No rash noted. She is not  diaphoretic. No erythema.   Psychiatric: She has a normal mood and affect. Her behavior is normal.       Body mass index is 17.26 kg/m².    Laboratory:  Recent Labs   Lab 02/26/20 0420 02/27/20 0417 02/28/20 0419    140 135*   K 3.9 4.9 4.4   CL 84* 92* 93*   CO2 44* 35* 34*   BUN 36* 32* 36*   CREATININE 5.0* 4.3* 4.4*   ESTGFRAFRICA 9.5* 11.4* 11.1*   EGFRNONAA 8.2* 9.9* 9.6*   GLU 69* 111* 90       Recent Labs   Lab 02/23/20 1530 02/24/20 0350 02/26/20 0420 02/27/20 0417 02/28/20 0419   CALCIUM 9.0 8.1*   < > 8.8 8.1* 7.9*   ALBUMIN 3.7 2.9*  --   --   --   --    PHOS  --   --   --  5.3* 4.7*  --    MG 1.5* 2.7*   < > 2.3 1.8 1.5*    < > = values in this interval not displayed.       Recent Labs   Lab 09/30/19  0820 11/15/19  0748   PTH, Intact 176.4 H 238.4 H       No results for input(s): POCTGLUCOSE in the last 168 hours.    Recent Labs   Lab 07/11/19  0806 11/15/19  0748 01/22/20  0848   Hemoglobin A1C 5.1 Invalid A SEE COMMENT       Recent Labs   Lab 02/26/20 0420 02/27/20 0417 02/28/20 0419   WBC 7.22 6.83 7.84   HGB 9.3* 8.0* 7.9*   HCT 30.7* 26.8* 26.4*    186 183   * 109* 106*   MCHC 30.3* 29.9* 29.9*   MONO 6.0  0.4 6.4  0.4 4.7  0.4       Recent Labs   Lab 02/23/20 1530 02/24/20 0350   BILITOT 1.9* 1.6*   PROT 6.1 4.7*   ALBUMIN 3.7 2.9*   ALKPHOS 231* 168*   ALT 25 20   AST 35 25       Recent Labs   Lab 02/10/20  2119 02/23/20  1715   Color, UA Yellow Yellow   Appearance, UA Hazy A Clear   pH, UA 6.0 >8.0 A   Specific Red House, UA 1.010 1.010   Protein, UA Trace A 1+ A   Glucose, UA Negative Negative   Ketones, UA Negative Negative   Urobilinogen, UA Negative Negative   Bilirubin (UA) Negative Negative   Occult Blood UA 2+ A Negative   Nitrite, UA Negative Negative   RBC, UA 5 H 4   WBC, UA 38 H 19 H   Bacteria Negative Negative   Hyaline Casts, UA 29 A 19 A             Microbiology Results (last 7 days)     Procedure Component Value Units Date/Time    Blood culture  #1 **CANNOT BE ORDERED STAT** [[id number]] Collected:  02/23/20 1530    Order Status:  Completed Specimen:  Blood from Peripheral, Upper Arm, Right Updated:  02/27/20 1632     Blood Culture, Routine No Growth to date      No Growth to date      No Growth to date      No Growth to date      No Growth to date    Blood culture #2 **CANNOT BE ORDERED STAT** [[id number]] Collected:  02/23/20 1545    Order Status:  Completed Specimen:  Blood from Peripheral, Upper Arm, Right Updated:  02/27/20 1632     Blood Culture, Routine No Growth to date      No Growth to date      No Growth to date      No Growth to date      No Growth to date    Urine culture [[id number]] Collected:  02/23/20 1715    Order Status:  Completed Specimen:  Urine Updated:  02/26/20 0947     Urine Culture, Routine No growth    Narrative:       Preferred Collection Type->Urine, Clean Catch  Specimen Source->Urine    Clostridium difficile EIA [[id number]] Collected:  02/23/20 2124    Order Status:  Completed Specimen:  Stool Updated:  02/23/20 2329     C. diff Antigen Negative     C difficile Toxins A+B, EIA Negative     Comment: Testing not recommended for children <24 months old.       Narrative:       Recoll. [id number] by CD3 at 02/23/2020 20:57, reason: Container not   labeled          ASSESSMENT/PLAN:     Active Hospital Problems    Diagnosis  POA    *Severe dehydration due to chronic diarrhea/high output ileostomy [E86.0]  Yes    DNR (do not resuscitate) [Z66]  Yes    Electrolyte imbalance [E87.8]  Yes    Severe malnutrition [E43]  Yes    DARCI (acute kidney injury) [N17.9]  Yes      Resolved Hospital Problems   No resolved problems to display.     DARCI due to volume depletion from high ostomy output  CKD stage 3  Hypomagnesemia  Acidosis  No NSAIDs, ACEI/ARB, IV contrast or other nephrotoxins.  Keep MAP > 60, SBP > 100.  Dose meds for GFR < 30 ml/min.  Renal diet - low K, low phos.  Agree with IVF, replete electrolytes as needed.  Needs Groshong and  TPN for several more weeks before closing the ostomy. Clear liquid diet or NPO based on output. D/w Dr. Kearns.  K is better, will keep KCL to 40 BID, liquid.    Anemia of CKD  Hgb and HCT are acceptable. Monitor.  Will provide ADAM and/or IV iron PRN.    MBD / Secondary HPT  PTH is high. Continue calcitriol.    HTN, now hypotension  BP seem controlled.   Tolerate asymptomatic HTN up to -160.  Hold home meds.    Thank you for allowing us to participate in the care of your patient!   We will follow the patient and provide recommendations as needed.    Jorge Webb MD    [facility]  [address] [place] [address] [id number] [phone] - tel  [phone] - fax    2/28/2020 10:50 AM

## 2020-02-28 NOTE — PROGRESS NOTES
"Gastroenterology    CC: diarrhea    S: chronic persistent daily heavy diarrhea.  No abd pain.      Past Medical History:   Diagnosis Date    Chronic kidney disease, stage III (moderate) 1/7/2018    Diabetes mellitus, type 2     Fatigue     Hypertension     Iron deficiency anemia due to chronic blood loss 1/7/2018    Vulvar cancer 1/7/2019       Review of Systems  Complete ros perf and neg unless in hpi    Physical Examination  /60 mmHg  Pulse 67  Temp(Src) 97.9 °F (36.6 °C) (Oral)  Resp 15  Ht 5' 8" (1.727 m)  Wt 74.9 kg (165 lb 2 oz)  BMI 25.11 kg/m2  SpO2 100%  LMP  (LMP Unknown)  Breastfeeding? No  General appearance: alert, cooperative, no distress  HENT: Normocephalic, atraumatic, neck symmetrical, no nasal discharge   Lungs: clear to auscultation bilaterally, no dullness to percussion bilaterally  Heart: regular rate and rhythm without rub; no displacement of the PMI   Abdomen: soft, non-tender; bowel sounds normoactive; no organomegaly  Extremities: extremities symmetric; no clubbing, cyanosis, or edema  Neurologic: Alert and oriented X 3, normal strength, normal coordination and gait    Labs:  Lab Results   Component Value Date    WBC 7.84 02/28/2020    HGB 7.9 (L) 02/28/2020    HCT 26.4 (L) 02/28/2020     (H) 02/28/2020     02/28/2020         Imaging:    Assessment:   69 y.o. female with short gut complicated by ileostomy     Plan:  Pt has had extensive treatments with Dr Mccullough/Tianna including gattex which worked then failed.  At this point she is either responding to high fiber or octreotide since admission. Agree with plans for tpn and can consider octreotide LAR as outpatient as attempt at stool control.  Very challenging case.  Dr Wynn to follow up next week      Amor Waite  [facility]  Cell: [phone]    "

## 2020-02-28 NOTE — ANESTHESIA PREPROCEDURE EVALUATION
02/28/2020  Shara Hutchins is a 69 y.o., female   Patient Active Problem List   Diagnosis    Inflammatory bowel disease    Iron deficiency anemia due to chronic blood loss    Vitamin D deficiency    Stage 5 chronic kidney disease not on chronic dialysis    Hisotry of Vulvar cancer    Anemia of decreased vitamin B12 absorption    Anemia due to stage 5 chronic kidney disease treated with erythropoietin    Hypothyroidism    Anemia of chronic disease    Nausea & vomiting    Diarrhea    Cellulitis    Acute renal failure superimposed on stage 5 chronic kidney disease, not on chronic dialysis    S/P ileostomy    DARCI (acute kidney injury)    DNR (do not resuscitate)    Severe dehydration due to chronic diarrhea/high output ileostomy    Electrolyte imbalance    Severe malnutrition       Past Surgical History:   Procedure Laterality Date    ABDOMINAL SURGERY      BACK SURGERY      CERVICAL FUSION      x 4    cervix repair      CHOLECYSTECTOMY  2000    DIAGNOSTIC LAPAROSCOPY N/A 2/13/2020    Procedure: LAPAROSCOPY, DIAGNOSTIC;  Surgeon: Robbi Lovell III, MD;  Location: [facility];  Service: General;  Laterality: N/A;    HYSTERECTOMY  1980    ILEOSTOMY N/A 2/13/2020    Procedure: CREATION, ILEOSTOMY Loop;  Surgeon: Robbi Lovell III, MD;  Location: [facility] OR;  Service: General;  Laterality: N/A;    LUMBAR LAMINECTOMY      LYSIS OF ADHESIONS N/A 2/13/2020    Procedure: LYSIS, ADHESIONS;  Surgeon: Robbi Lovell III, MD;  Location: [facility] OR;  Service: General;  Laterality: N/A;    NECK SURGERY      REMOVAL OF VASCULAR ACCESS PORT Right 2/13/2020    Procedure: REMOVAL, VASCULAR ACCESS PORT;  Surgeon: Robbi Lovell III, MD;  Location: [facility];  Service: General;  Laterality: Right;    SHOULDER SURGERY      vulva cancer          Tobacco Use:  The patient  reports that she quit  smoking about 19 years ago. Her smoking use included cigarettes. She has a 33.00 pack-year smoking history. She has never used smokeless tobacco.     Results for orders placed or performed during the hospital encounter of 02/10/20   EKG 12-lead    Collection Time: 02/10/20  1:27 PM    Narrative    Test Reason : K92.1,    Vent. Rate : 076 BPM     Atrial Rate : 076 BPM     P-R Int : 154 ms          QRS Dur : 088 ms      QT Int : 352 ms       P-R-T Axes : 073 030 030 degrees     QTc Int : 396 ms    Normal sinus rhythm  Cannot rule out Inferior infarct ,age undetermined  Abnormal ECG  No previous ECGs available  Confirmed by Adonis Mclean MD ([id number]) on 2/11/2020 9:27:35 AM    Referred By: [email or web address]   SELF           Confirmed By:Adonis Mclean MD             Lab Results   Component Value Date    WBC 7.84 02/28/2020    HGB 7.9 (L) 02/28/2020    HCT 26.4 (L) 02/28/2020     (H) 02/28/2020     02/28/2020     BMP  Lab Results   Component Value Date     (L) 02/28/2020    K 4.4 02/28/2020    CL 93 (L) 02/28/2020    CO2 34 (H) 02/28/2020    BUN 36 (H) 02/28/2020    CREATININE 4.4 (H) 02/28/2020    CALCIUM 7.9 (L) 02/28/2020    ANIONGAP 8 02/28/2020    ESTGFRAFRICA 11.1 (A) 02/28/2020    EGFRNONAA 9.6 (A) 02/28/2020             Pre-op Assessment    I have reviewed the Patient Summary Reports.     I have reviewed the Nursing Notes.   I have reviewed the Medications.     Review of Systems  Anesthesia Hx:  No problems with previous Anesthesia  History of prior surgery of interest to airway management or planning: cervical fusion. Denies Family Hx of Anesthesia complications.   Denies Personal Hx of Anesthesia complications.   Social:  Former Smoker    Hematology/Oncology:         -- Anemia: Hematology Comments: Chronic anemia, easy bruising Current/Recent Cancer. Oncology Comments: Vulvar and ovarian cancer s/p surgery, radiation, chemo. Pt has right chest port that she reports is non functional.      Cardiovascular:   Exercise tolerance: good Hypertension Per pt, antihypertensives discontinued approx 3 weeks ago due to hypotension Functional Capacity good / => 4 METS    Pulmonary:   COPD, mild Sinus drainage for past few weeks, no recent fevers, abx, steorid use   Renal/:   Chronic Renal Disease, CRI LUE AVF but no history of HD, AVF with multiple vascular stents   Hepatic/GI:   IBS, short gut syndrome, chronic diarrhea; current nausea and abdominal pain; vomiting previously this admission   Musculoskeletal:  Spine Disorders: cervical and lumbar    Neurological:   Peripheral Neuropathy (bilateral feet)    Endocrine:   Diabetes (previous hx, no medications currently) Hypothyroidism        Physical Exam  General:  Malnutrition    Airway/Jaw/Neck:  Airway Findings: Mouth Opening: Normal Mallampati: II  TM Distance: Normal, at least 6 cm  Jaw/Neck Findings:  Neck ROM: Normal ROM      Dental:  Dental Findings: (lower bridge) Upper Dentures    Chest/Lungs:  Chest/Lungs Findings: Clear to auscultation, Normal Respiratory Rate     Heart/Vascular:  Heart Findings: Rate: Normal  Rhythm: Regular Rhythm  Sounds: Normal  Vascular Findings:  Dialysis Access: AV Fistula LUE  Vascular Exam Findings: Right chest port (per patient, not functioning)       Skin:  Skin Findings:  Ecchymosis     Mental Status:  Mental Status Findings:  Cooperative, Alert and Oriented         Anesthesia Plan  Type of Anesthesia, risks & benefits discussed:  Anesthesia Type:  general  Patient's Preference:   Intra-op Monitoring Plan: standard ASA monitors  Intra-op Monitoring Plan Comments:   Post Op Pain Control Plan: IV/PO Opioids PRN  Post Op Pain Control Plan Comments:   Induction:   IV  Beta Blocker:  Patient is not currently on a Beta-Blocker (No further documentation required).       Informed Consent: Patient understands risks and agrees with Anesthesia plan.  Questions answered. Anesthesia consent signed with patient.  ASA Score: 3     Day  of Surgery Review of History & Physical:    H&P update referred to the surgeon.         Ready For Surgery From Anesthesia Perspective.

## 2020-02-28 NOTE — PROGRESS NOTES
[facility] Medicine  Progress Note    Patient Name: Shara Hutchins  MRN: [id number]  Patient Class: IP- Inpatient   Admission Date: 2/23/2020  Length of Stay: 3 days  Attending Physician: Aleksander Phan MD  Primary Care Provider: April Horton MD        Subjective:     Principal Problem:Severe dehydration    Interval History:   Patient is seen and examined  Central line placement was not successful because of arteriosclerosis   Possible need for femur line assessment at this time  osteostomy  output decreased to 2 L  Review of Systems  Objective:     Vital Signs (Most Recent):  Temp: 98.4 °F (36.9 °C) (02/28/20 1500)  Pulse: 71 (02/28/20 1500)  Resp: 18 (02/28/20 1500)  BP: 117/61 (02/28/20 1500)  SpO2: 95 % (02/28/20 1500) Vital Signs (24h Range):  Temp:  [97 °F (36.1 °C)-98.4 °F (36.9 °C)] 98.4 °F (36.9 °C)  Pulse:  [66-97] 71  Resp:  [14-23] 18  SpO2:  [94 %-100 %] 95 %  BP: (105-175)/(55-83) 117/61     Weight: 50 kg (110 lb 3.2 oz)  Body mass index is 17.26 kg/m².    Intake/Output Summary (Last 24 hours) at 2/28/2020 1607  Last data filed at 2/28/2020 1500  Gross per 24 hour   Intake 3892.96 ml   Output 4235 ml   Net -342.04 ml      Physical Exam:  General- Patient alert and oriented x3 in NAD  HEENT- PERRLA, EOMI, OP clear, MMM  Neck- No JVD, Lymphadenopathy, Thyromegaly  CV- Regular rate and rhythm, No Murmur/jackie/rubs  Resp- Lungs CTA Bilaterally, No increased WOB  GI- Non tender BS normoactive x4 quads  Extrem- No cyanosis, clubbing, edema  Neuro- Strength 5/5 flexors/extensors,Skin-  No masses, rashes or lesions noted on cursory skin exam.    Overview/Hospital Course:     Significant Labs:   CBC:   Recent Labs   Lab 02/27/20  0417 02/28/20  0419   WBC 6.83 7.84   HGB 8.0* 7.9*   HCT 26.8* 26.4*    183     CMP:   Recent Labs   Lab 02/27/20  0417 02/28/20  0419    135*   K 4.9 4.4   CL 92* 93*   CO2 35* 34*   * 90   BUN 32* 36*   CREATININE 4.3* 4.4*   CALCIUM  8.1* 7.9*   ANIONGAP 13 8   EGFRNONAA 9.9* 9.6*       Significant Imaging: I have reviewed all pertinent imaging results/findings within the past 24 hours.    Assessment/Plan:      Severe dehydration with hypotension  secondary to High output iliostomy;   Also possible Due to chemoradiation therapy induced chronic diarrhea worsened after ileostomy.     TPN and NPO , IVF    Metamucil ,Loperamide 4mg QID. , add codeine tab  protonix IV   Questran  Sandostatin  Follow-up GI, General surgery  Patient most likely going to need skilled nursing facility  Consult anesthesia for femoral line placement   Dr velasquez may place   On the line is ready for 1 unit of blood transfusion with 20 mg Lasix post transfusion     Malnutrition with  weight loss of 20 lb in 1 week  Start  TPN     Lactic Acidosis secondary to dehydration , no sign of infection -resolved.C diff and blood cultures neg  Continue  antibiotics  given the cause could be intra-abdominal sepsis, enteritis    DARCI on CKD V:   Continue IV fluids high dose   Avoid nephrotoxins  Avoid NSAID, Ace inhibitors, arbs  Renal dose all medications      Vulvar cancer with skin excoriation due to XRT   wound care       Hypothyroidism:   continue Levothyroxine.     Anemia   Monitor and may need transfusion soon     Deconditioning:   Hypomagnesemia-resolved   DNR status        ________________________________________________________________________________          Active Diagnoses:    Diagnosis Date Noted POA    PRINCIPAL PROBLEM:  Severe dehydration due to chronic diarrhea/high output ileostomy [E86.0] 02/24/2020 Yes    DNR (do not resuscitate) [Z66] 02/24/2020 Yes    Electrolyte imbalance [E87.8] 02/24/2020 Yes    Severe malnutrition [E43] 02/24/2020 Yes    DARCI (acute kidney injury) [N17.9] 02/23/2020 Yes      Problems Resolved During this Admission:     VTE Risk Mitigation (From admission, onward)         Ordered     IP VTE HIGH RISK PATIENT  Once      02/23/20 2025      Reason for No Pharmacological VTE Prophylaxis  Once     Question:  Reasons:  Answer:  Risk of Bleeding    02/23/20 2025                   Aleksander Phan MD  Department of Hospital Medicine   Formerly [facility], [facility]

## 2020-02-28 NOTE — NURSING
REPORT RECEIVED FROM PACU. CENTRAL LINE INSERTION WAS NOT SUCCESSFUL. ALSO BOTH PERPH. IV'S IN RIGHT ARM INFILTRATED. PT NOW HAS ONLY A LEFT EJ AT THIS TIME. CALLED DR. SALAZAR TO ADVISE.

## 2020-02-28 NOTE — PT/OT/SLP PROGRESS
Physical Therapy      Patient Name:  Shara Hutchins   MRN:  [id number]    Patient not seen today secondary to (on hold 2/2 procedure). Will follow-up 02/29/20.    Amanda Ayers PTA

## 2020-02-28 NOTE — NURSING
SPOKE WITH DR. RODRIGUEZ AND HE ADVISED HE WAS UNABLE TO PLACE CENTRAL LINE DUE TO STENOSIS. ALSO SPOKE WITH GERALDO PICC NURSE WHO ALSO STATED HE WOULD BE UNABLE TO PLACE PICC LINE DUE TO SAME STENOSIS IN [place] VE[place]. I ADVISED DR. SALAZAR THAT PT HAS ONLY 2 [facility] IV SITES AT THIS TIME AND HAS DRIPS INFUSING IN BOTH, PT HAS NOT RECEIVED ANY ANTIBIOTICS IVP TODAY DUE TO LACK OF IV SITE. PT ALSO HAS ORDER TO TRANSFUSE 2 UNITS PRBC'S TODAY BUT NO SITE TO TRANSFUSE AT THIS TIME.

## 2020-02-28 NOTE — TRANSFER OF CARE
"Anesthesia Transfer of Care Note    Patient: Shara Hutchins    Procedure(s) Performed: Procedure(s) (LRB):  Attempted Insertion,central venous access device (Right)    Patient location: PACU    Anesthesia Type: general    Transport from OR: Transported from OR on room air with adequate spontaneous ventilation    Post pain: adequate analgesia    Post assessment: no apparent anesthetic complications    Post vital signs: stable    Level of consciousness: awake, alert and oriented    Nausea/Vomiting: no nausea/vomiting    Complications: none    Transfer of care protocol was followedComments: Patient extubated awake and following commands. To PACU. Report given to RN.       Last vitals:   Visit Vitals  /68   Pulse 97   Temp 36.6 °C (97.9 °F) (Oral)   Resp 16   Ht 5' 7" (1.702 m)   Wt 50 kg (110 lb 3.2 oz)   SpO2 96%   Breastfeeding? No   BMI 17.26 kg/m²     "

## 2020-02-28 NOTE — ANESTHESIA POSTPROCEDURE EVALUATION
Anesthesia Post Evaluation    Patient: Shara Hutchins    Procedure(s) Performed: Procedure(s) (LRB):  Attempted Insertion,central venous access device (Right)    Final Anesthesia Type: general    Patient location during evaluation: PACU  Patient participation: Yes- Able to Participate  Level of consciousness: awake and alert  Post-procedure vital signs: reviewed and stable  Pain management: adequate  Airway patency: patent  EMMA mitigation strategies: Multimodal analgesia, Extubation while patient is awake, Verification of full reversal of neuromuscular block and Extubation and recovery carried out in lateral, semiupright, or other nonsupine position  PONV status at discharge: No PONV  Anesthetic complications: no      Cardiovascular status: hemodynamically stable  Respiratory status: unassisted, spontaneous ventilation and room air  Hydration status: euvolemic  Follow-up not needed.          Vitals Value Taken Time   /77 2/28/2020 12:00 PM   Temp 36.7 °C (98 °F) 2/28/2020 11:45 AM   Pulse 84 2/28/2020 12:00 PM   Resp 45 2/28/2020 12:00 PM   SpO2 95 % 2/28/2020 12:00 PM   Vitals shown include unvalidated device data.      No case tracking events are documented in the log.      Pain/Kvng Score: Pain Rating Prior to Med Admin: 5 (2/28/2020  2:16 AM)  Pain Rating Post Med Admin: 0 (2/28/2020  3:00 AM)  Kvng Score: 9 (2/28/2020 11:45 AM)

## 2020-02-28 NOTE — NURSING
SPOKE WITH DR. WEST IN REF. TO HAVING FEMORAL TLC PLACED. HE STATED ONLY AS LAST RESORT AND WANTED TO WAIT TO SEE IF DR. CONTRERAS WOULD BE ABLE TO PLACE CENTRAL LINE FIRST. I LET DR. SALAZAR KNOW THIS AS WELL VIA SECURE CHAT.

## 2020-02-28 NOTE — BRIEF OP NOTE
[facility]  Brief Operative Note    SUMMARY     Surgery Date: 2/28/2020     Surgeon(s) and Role:     * Robbi Lovell III, MD - Primary    Assisting Surgeon: None    Pre-op Diagnosis:  Severe malnutrition [E43]  Venous insufficiency [I87.2]  Electrolyte imbalance [E87.8]  Severe dehydration [E86.0]    Post-op Diagnosis:  Post-Op Diagnosis Codes:     * Severe malnutrition [E43]     * Venous insufficiency [I87.2]     * Electrolyte imbalance [E87.8]     * Severe dehydration [E86.0]  Central venous stenosis right sided system     Procedure(s) (LRB):  Attempted Insertion,central venous access device (Right) - subclavian vein access successful with access needle.  Wire would only thread to the opposite IJ.  Venogram performed through a 5 Romansh introducer sheath    Anesthesia: General    Description of Procedure:  Patient brought to the operating room and given general anesthesia and intubated.  Neck and chest was prepped and draped.  The I 1st used the ultrasound to identify the right internal jugular vein. It was filled with debris consistent with clot from the clavicle up to near the angle of the jaw.  Patient has a left-sided AV fistula that we are trying to save.  It was recommended not to try the left side and so I performed percutaneous stick to the right subclavian.  She had a Port-A-Cath in place here for many years that was removed earlier this year.  I was able to easily stick the subclavian the wire did thread.  Under fluoroscopy the wire was seen going across midline into the opposite IJ.  I tried to maneuver the wire into the superior vena cava but was unsuccessful.  I then placed a 5 Romansh introducer sheath over the wire into the subclavian vein.  A venogram was performed.  There appeared to be flow into the superior vena cava but through a very tight stenosis.  The left IJ was not attempted.  Patient was extubated and brought to recovery room in stable condition. Instrument counts  correct     Description of the findings of the procedure:  Right IJ clot.  Right-sided central venous stenosis    Estimated Blood Loss: 10 mL    Will discuss with vascular surgery to see if they can offer any other option. She will need TPN for several weeks. If central line cannot be placed, will be on PPN via peripheral IV's.  The femoral is a potential site but central line would need to be changed weekly.          Specimens: none  Specimen (12h ago, onward)    None

## 2020-02-29 LAB
ANION GAP SERPL CALC-SCNC: 7 MMOL/L (ref 8–16)
BASOPHILS # BLD AUTO: 0.02 K/UL (ref 0–0.2)
BASOPHILS NFR BLD: 0.3 % (ref 0–1.9)
BUN SERPL-MCNC: 41 MG/DL (ref 8–23)
CALCIUM SERPL-MCNC: 7.8 MG/DL (ref 8.7–10.5)
CHLORIDE SERPL-SCNC: 95 MMOL/L (ref 95–110)
CO2 SERPL-SCNC: 32 MMOL/L (ref 23–29)
CREAT SERPL-MCNC: 4.3 MG/DL (ref 0.5–1.4)
DIFFERENTIAL METHOD: ABNORMAL
EOSINOPHIL # BLD AUTO: 0.4 K/UL (ref 0–0.5)
EOSINOPHIL NFR BLD: 5.7 % (ref 0–8)
ERYTHROCYTE [DISTWIDTH] IN BLOOD BY AUTOMATED COUNT: 13.9 % (ref 11.5–14.5)
EST. GFR  (AFRICAN AMERICAN): 11.4 ML/MIN/1.73 M^2
EST. GFR  (NON AFRICAN AMERICAN): 9.9 ML/MIN/1.73 M^2
GLUCOSE SERPL-MCNC: 107 MG/DL (ref 70–110)
HCT VFR BLD AUTO: 25.3 % (ref 37–48.5)
HGB BLD-MCNC: 7.7 G/DL (ref 12–16)
IMM GRANULOCYTES # BLD AUTO: 0.03 K/UL (ref 0–0.04)
IMM GRANULOCYTES NFR BLD AUTO: 0.4 % (ref 0–0.5)
LYMPHOCYTES # BLD AUTO: 0.9 K/UL (ref 1–4.8)
LYMPHOCYTES NFR BLD: 12.3 % (ref 18–48)
MAGNESIUM SERPL-MCNC: 2.1 MG/DL (ref 1.6–2.6)
MCH RBC QN AUTO: 32.5 PG (ref 27–31)
MCHC RBC AUTO-ENTMCNC: 30.4 G/DL (ref 32–36)
MCV RBC AUTO: 107 FL (ref 82–98)
MONOCYTES # BLD AUTO: 0.4 K/UL (ref 0.3–1)
MONOCYTES NFR BLD: 5.5 % (ref 4–15)
NEUTROPHILS # BLD AUTO: 5.6 K/UL (ref 1.8–7.7)
NEUTROPHILS NFR BLD: 75.8 % (ref 38–73)
NRBC BLD-RTO: 0 /100 WBC
PHOSPHATE SERPL-MCNC: 3.9 MG/DL (ref 2.7–4.5)
PLATELET # BLD AUTO: 157 K/UL (ref 150–350)
PMV BLD AUTO: 11.6 FL (ref 9.2–12.9)
POTASSIUM SERPL-SCNC: 3.8 MMOL/L (ref 3.5–5.1)
RBC # BLD AUTO: 2.37 M/UL (ref 4–5.4)
SODIUM SERPL-SCNC: 134 MMOL/L (ref 136–145)
WBC # BLD AUTO: 7.41 K/UL (ref 3.9–12.7)

## 2020-02-29 PROCEDURE — 63600175 PHARM REV CODE 636 W HCPCS: Performed by: INTERNAL MEDICINE

## 2020-02-29 PROCEDURE — 63600175 PHARM REV CODE 636 W HCPCS: Performed by: STUDENT IN AN ORGANIZED HEALTH CARE EDUCATION/TRAINING PROGRAM

## 2020-02-29 PROCEDURE — B4185 PARENTERAL SOL 10 GM LIPIDS: HCPCS | Performed by: INTERNAL MEDICINE

## 2020-02-29 PROCEDURE — 63600175 PHARM REV CODE 636 W HCPCS: Performed by: SURGERY

## 2020-02-29 PROCEDURE — 80048 BASIC METABOLIC PNL TOTAL CA: CPT

## 2020-02-29 PROCEDURE — 85025 COMPLETE CBC W/AUTO DIFF WBC: CPT

## 2020-02-29 PROCEDURE — 84100 ASSAY OF PHOSPHORUS: CPT

## 2020-02-29 PROCEDURE — C9113 INJ PANTOPRAZOLE SODIUM, VIA: HCPCS | Performed by: SURGERY

## 2020-02-29 PROCEDURE — 25000003 PHARM REV CODE 250: Performed by: SURGERY

## 2020-02-29 PROCEDURE — 25000003 PHARM REV CODE 250: Performed by: INTERNAL MEDICINE

## 2020-02-29 PROCEDURE — 83735 ASSAY OF MAGNESIUM: CPT

## 2020-02-29 PROCEDURE — P9016 RBC LEUKOCYTES REDUCED: HCPCS

## 2020-02-29 PROCEDURE — 36430 TRANSFUSION BLD/BLD COMPNT: CPT

## 2020-02-29 PROCEDURE — 21400001 HC TELEMETRY ROOM

## 2020-02-29 RX ORDER — DIPHENHYDRAMINE HYDROCHLORIDE 50 MG/ML
6.25 INJECTION INTRAMUSCULAR; INTRAVENOUS ONCE
Status: COMPLETED | OUTPATIENT
Start: 2020-02-29 | End: 2020-02-29

## 2020-02-29 RX ADMIN — DIPHENHYDRAMINE HYDROCHLORIDE 6.25 MG: 50 INJECTION, SOLUTION INTRAMUSCULAR; INTRAVENOUS at 07:02

## 2020-02-29 RX ADMIN — DIPHENHYDRAMINE HYDROCHLORIDE 12.5 MG: 50 INJECTION INTRAMUSCULAR; INTRAVENOUS at 02:02

## 2020-02-29 RX ADMIN — PANTOPRAZOLE SODIUM 40 MG: 40 INJECTION, POWDER, LYOPHILIZED, FOR SOLUTION INTRAVENOUS at 08:02

## 2020-02-29 RX ADMIN — MORPHINE SULFATE 2 MG: 2 INJECTION, SOLUTION INTRAMUSCULAR; INTRAVENOUS at 03:02

## 2020-02-29 RX ADMIN — LOPERAMIDE HYDROCHLORIDE 4 MG: 2 CAPSULE ORAL at 08:02

## 2020-02-29 RX ADMIN — MEROPENEM 500 MG: 500 INJECTION, POWDER, FOR SOLUTION INTRAVENOUS at 08:02

## 2020-02-29 RX ADMIN — OCTREOTIDE ACETATE 25 MCG/HR: 500 INJECTION, SOLUTION INTRAVENOUS; SUBCUTANEOUS at 05:02

## 2020-02-29 RX ADMIN — LEUCINE, PHENYLALANINE, LYSINE, METHIONINE, ISOLEUCINE, VALINE, HISTIDINE, THREONINE, TRYPTOPHAN, ALANINE, GLYCINE, ARGININE, PROLINE, SERINE, TYROSINE, DEXTROSE 1000 ML: 311; 238; 247; 170; 255; 247; 204; 179; 77; 880; 438; 489; 289; 213; 17; 5 INJECTION INTRAVENOUS at 03:02

## 2020-02-29 RX ADMIN — VANCOMYCIN HYDROCHLORIDE 750 MG: 1 INJECTION, POWDER, LYOPHILIZED, FOR SOLUTION INTRAVENOUS at 11:02

## 2020-02-29 RX ADMIN — PSYLLIUM HUSK 2 PACKET: 3.4 POWDER ORAL at 08:02

## 2020-02-29 RX ADMIN — SOYBEAN OIL 250 ML: 20 INJECTION, SOLUTION INTRAVENOUS at 04:02

## 2020-02-29 NOTE — CONSULTS
Nephrology Consult Note        Patient Name: Shara Hutchins  MRN: [id number]    Patient Class: IP- Inpatient   Admission Date: 2/23/2020  Length of Stay: 4 days  Date of Service: 2/29/2020    Attending Physician: Aleksander Phan MD  Primary Care Provider: April Horton MD    Reason for Consult: darci/ckd3/hypovolemia/hypomagnesemia/shpt/anemia/htn    SUBJECTIVE:     HPI: 69F with HTN, DM2, hypothyroidism, secondary hyperparathyroidism, anemia, vulvar Ca (in remission), CKD3 presentswith abdominal pain, lightheadedness, vaginal bleeding. Had an ileostomy on 2/13, d/c on 2/17 (Dr. Meza) for chronic, debilitating diarrhea ? Pneumatosis intestinalis. Since then, she lost 20 lbs, has intermittent nausea and vomiting that is non-bloody and non-bilious, has high ostomy output - seeing whole pills at times. Pain to her abdominal incision site is worsening, had some lightheadedness when standing. Renal consulted for DARCI.    2/25 VSS, no new complains.  2/26 VSS, sCr worse today. UO+OO = > 5L today!!! Will have to step up IVF significantly to match loss + some....  2/27 VSS, remains to have high output from ostomy, agree with liquid diet and TPN as d/w Dr. Kearns, no PICC line but a tunneled Groshong, she is close to dialysis, has AVF in one arm...  2/28 VSS, ostomy output is down and UO is up, agree with liquid diet and TPN as d/w Dr. Kearns, no PICC line but a tunneled Groshong, she is close to dialysis, has AVF in one arm...  2/29 VSS, ostomy output is down and UO is up, agree with liquid diet and TPN as d/w Dr. Kearns. No PICC line but a tunneled Groshong, she is close to dialysis, has AVF in one arm...    Past Medical History:   Diagnosis Date    Chronic kidney disease, stage III (moderate) 1/7/2018    Diabetes mellitus, type 2     Fatigue     Hypertension     Iron deficiency anemia due to chronic blood loss 1/7/2018    Vulvar cancer 1/7/2019     Past Surgical History:   Procedure Laterality Date    ABDOMINAL  SURGERY      BACK SURGERY      CERVICAL FUSION      x 4    cervix repair      CHOLECYSTECTOMY      DIAGNOSTIC LAPAROSCOPY N/A 2020    Procedure: LAPAROSCOPY, DIAGNOSTIC;  Surgeon: Robbi Lovell III, MD;  Location: [facility];  Service: General;  Laterality: N/A;    HYSTERECTOMY  1980    ILEOSTOMY N/A 2020    Procedure: CREATION, ILEOSTOMY Loop;  Surgeon: Robbi Lovell III, MD;  Location: [facility] OR;  Service: General;  Laterality: N/A;    LUMBAR LAMINECTOMY      LYSIS OF ADHESIONS N/A 2020    Procedure: LYSIS, ADHESIONS;  Surgeon: Robbi Lovell III, MD;  Location: [facility] OR;  Service: General;  Laterality: N/A;    NECK SURGERY      PHLEBOGRAPHY  2020    Procedure: VENOGRAM;  Surgeon: Robbi Lovell III, MD;  Location: [facility];  Service: General;;    REMOVAL OF VASCULAR ACCESS PORT Right 2020    Procedure: REMOVAL, VASCULAR ACCESS PORT;  Surgeon: Robbi Lovell III, MD;  Location: [facility];  Service: General;  Laterality: Right;    SHOULDER SURGERY      vulva cancer       Family History   Problem Relation Age of Onset    Heart disease Mother     Hypertension Mother     Cancer Mother         lung    Heart disease Father     Heart disease Sister         Open heart surgery    No Known Problems Daughter      Social History     Tobacco Use    Smoking status: Former Smoker     Packs/day: 1.00     Years: 33.00     Pack years: 33.00     Types: Cigarettes     Last attempt to quit: 2000     Years since quittin.6    Smokeless tobacco: Never Used   Substance Use Topics    Alcohol use: No    Drug use: No       Review of patient's allergies indicates:   Allergen Reactions    Ciprofloxacin Other (See Comments)     Elevated liver enzymes      Pcn [penicillins] Anaphylaxis       Outpatient meds:  No current facility-administered medications on file prior to encounter.      Current Outpatient Medications on File Prior to Encounter   Medication Sig Dispense  Refill    calcitRIOL (ROCALTROL) 0.5 MCG Cap Take 1 capsule by mouth once daily.  3    dicyclomine (BENTYL) 10 MG capsule TAKE 1 CAPSULE THREE TIMES A DAY (Patient taking differently: Take 10 mg by mouth 3 (three) times daily. ) 270 capsule 4    diphenhydrAMINE (BENADRYL) 25 mg capsule Take 25 mg by mouth nightly as needed for Itching.      dronabinol (MARINOL) 2.5 MG capsule Take 1 capsule (2.5 mg total) by mouth 2 (two) times daily before meals. 60 capsule 0    HYDROcodone-acetaminophen (NORCO)  mg per tablet Take 1 tablet by mouth every 6 (six) hours as needed for Pain. 30 tablet 0    levothyroxine (SYNTHROID) 88 MCG tablet TAKE 1 TABLET DAILY (Patient taking differently: Take 88 mcg by mouth every morning. ) 90 tablet 4    magnesium oxide (MAG-OX) 400 mg (241.3 mg magnesium) tablet Take 400 mg by mouth 2 (two) times daily.      potassium chloride (KLOR-CON) 10 MEQ TbSR Take 20 mEq by mouth 2 (two) times daily.       promethazine (PHENERGAN) 25 MG tablet Take 1 tablet (25 mg total) by mouth daily as needed for Nausea. 90 tablet 0    sertraline (ZOLOFT) 50 MG tablet Take 50 mg by mouth once daily.       sodium bicarbonate 650 MG tablet Take 650 mg by mouth 3 (three) times daily.   2       Scheduled meds:   cholestyramine-aspartame  1 packet Oral BID    dronabinol  2.5 mg Oral BID AC    fat emulsion 20%  250 mL Intravenous Daily    levothyroxine  88 mcg Oral QAM    loperamide  4 mg Oral QID    magnesium oxide  400 mg Oral BID    meropenem (MERREM) IVPB  500 mg Intravenous Daily    pantoprazole  40 mg Intravenous BID    potassium chloride 10%  40 mEq Oral BID    psyllium husk (with sugar)  2 packet Oral TID    sertraline  50 mg Oral Daily    vancomycin (VANCOCIN) IVPB  750 mg Intravenous Once       Infusions:   amino acid 4.25 % in D5W 1,000 mL (02/28/20 1711)    lactated ringers 200 mL/hr at 02/28/20 0615    octreotide (SANDOSTATIN) infusion 25 mcg/hr (02/28/20 1800)       PRN  meds:  sodium chloride, acetaminophen, calcium chloride IVPB, calcium chloride IVPB, calcium chloride IVPB, codeine, dextrose 50%, dextrose 50%, glucagon (human recombinant), glucose, glucose, HYDROcodone-acetaminophen, magnesium oxide, magnesium sulfate IVPB, magnesium sulfate IVPB, magnesium sulfate IVPB, magnesium sulfate IVPB, melatonin, morphine, ondansetron, potassium chloride in water, potassium chloride in water, potassium chloride in water, potassium chloride in water, potassium chloride, potassium chloride, potassium chloride, potassium chloride, sodium chloride 0.9%, sodium chloride 0.9%, sodium phosphate IVPB, sodium phosphate IVPB, sodium phosphate IVPB, sodium phosphate IVPB, sodium phosphate IVPB, Pharmacy to dose Vancomycin consult **AND** vancomycin - pharmacy to dose    Review of Systems:  ROS    OBJECTIVE:     Vital Signs and IO (Last 24H):  Temp:  [97 °F (36.1 °C)-98.5 °F (36.9 °C)]   Pulse:  [61-84]   Resp:  [14-23]   BP: (105-175)/(55-83)   SpO2:  [94 %-100 %]   I/O last 3 completed shifts:  In: 5222.6 [P.O.:400; I.V.:2798.8]  Out: 5785 [Urine:2475; Stool:3300; Blood:10]    Wt Readings from Last 5 Encounters:   02/29/20 51.1 kg (112 lb 11.2 oz)   02/19/20 58.7 kg (129 lb 4.8 oz)   02/19/20 58.8 kg (129 lb 9.6 oz)   02/14/20 56.7 kg (125 lb)   02/05/20 56.9 kg (125 lb 6.4 oz)         Physical Exam:  Physical Exam   Constitutional: She is oriented to person, place, and time. She appears well-developed and well-nourished.   HENT:   Head: Normocephalic and atraumatic.   Mouth/Throat: Oropharynx is clear and moist.   Eyes: Pupils are equal, round, and reactive to light. EOM are normal. No scleral icterus.   Neck: Neck supple.   Cardiovascular: Normal rate and regular rhythm.   Pulmonary/Chest: Effort normal. No stridor. No respiratory distress.   Abdominal: Soft. She exhibits no distension.   Musculoskeletal: Normal range of motion. She exhibits no edema or deformity.   Neurological: She is alert  and oriented to person, place, and time. No cranial nerve deficit.   Skin: Skin is warm and dry. No rash noted. She is not diaphoretic. No erythema.   Psychiatric: She has a normal mood and affect. Her behavior is normal.       Body mass index is 17.65 kg/m².    Laboratory:  Recent Labs   Lab 02/27/20  0417 02/28/20  0419 02/29/20  0440    135* 134*   K 4.9 4.4 3.8   CL 92* 93* 95   CO2 35* 34* 32*   BUN 32* 36* 41*   CREATININE 4.3* 4.4* 4.3*   ESTGFRAFRICA 11.4* 11.1* 11.4*   EGFRNONAA 9.9* 9.6* 9.9*   * 90 107       Recent Labs   Lab 02/23/20  1530 02/24/20  0350  02/26/20  0420 02/27/20  0417 02/28/20 0419 02/29/20  0440   CALCIUM 9.0 8.1*   < > 8.8 8.1* 7.9* 7.8*   ALBUMIN 3.7 2.9*  --   --   --   --   --    PHOS  --   --   --  5.3* 4.7*  --   --    MG 1.5* 2.7*   < > 2.3 1.8 1.5* 2.1    < > = values in this interval not displayed.       Recent Labs   Lab 09/30/19  0820 11/15/19  0748   PTH, Intact 176.4 H 238.4 H       No results for input(s): POCTGLUCOSE in the last 168 hours.    Recent Labs   Lab 07/11/19  0806 11/15/19  0748 01/22/20  0848   Hemoglobin A1C 5.1 Invalid A SEE COMMENT       Recent Labs   Lab 02/27/20  0417 02/28/20  0419 02/29/20  0440   WBC 6.83 7.84 7.41   HGB 8.0* 7.9* 7.7*   HCT 26.8* 26.4* 25.3*    183 157   * 106* 107*   MCHC 29.9* 29.9* 30.4*   MONO 6.4  0.4 4.7  0.4 5.5  0.4       Recent Labs   Lab 02/23/20  1530 02/24/20  0350   BILITOT 1.9* 1.6*   PROT 6.1 4.7*   ALBUMIN 3.7 2.9*   ALKPHOS 231* 168*   ALT 25 20   AST 35 25       Recent Labs   Lab 02/10/20  2119 02/23/20  1715   Color, UA Yellow Yellow   Appearance, UA Hazy A Clear   pH, UA 6.0 >8.0 A   Specific Gallion, UA 1.010 1.010   Protein, UA Trace A 1+ A   Glucose, UA Negative Negative   Ketones, UA Negative Negative   Urobilinogen, UA Negative Negative   Bilirubin (UA) Negative Negative   Occult Blood UA 2+ A Negative   Nitrite, UA Negative Negative   RBC, UA 5 H 4   WBC, UA 38 H 19 H   Bacteria  Negative Negative   Hyaline Casts, UA 29 A 19 A             Microbiology Results (last 7 days)     Procedure Component Value Units Date/Time    Blood culture #1 **CANNOT BE ORDERED STAT** [[id number]] Collected:  02/23/20 1530    Order Status:  Completed Specimen:  Blood from Peripheral, Upper Arm, Right Updated:  02/28/20 1632     Blood Culture, Routine No growth after 5 days.    Blood culture #2 **CANNOT BE ORDERED STAT** [[id number]] Collected:  02/23/20 1545    Order Status:  Completed Specimen:  Blood from Peripheral, Upper Arm, Right Updated:  02/28/20 1632     Blood Culture, Routine No growth after 5 days.    Urine culture [[id number]] Collected:  02/23/20 1715    Order Status:  Completed Specimen:  Urine Updated:  02/26/20 0947     Urine Culture, Routine No growth    Narrative:       Preferred Collection Type->Urine, Clean Catch  Specimen Source->Urine    Clostridium difficile EIA [[id number]] Collected:  02/23/20 2124    Order Status:  Completed Specimen:  Stool Updated:  02/23/20 2329     C. diff Antigen Negative     C difficile Toxins A+B, EIA Negative     Comment: Testing not recommended for children <24 months old.       Narrative:       Recoll. [id number] by CD3 at 02/23/2020 20:57, reason: Container not   labeled          ASSESSMENT/PLAN:     Active Hospital Problems    Diagnosis  POA    *Severe dehydration due to chronic diarrhea/high output ileostomy [E86.0]  Yes    DNR (do not resuscitate) [Z66]  Yes    Electrolyte imbalance [E87.8]  Yes    Severe malnutrition [E43]  Yes    DARCI (acute kidney injury) [N17.9]  Yes      Resolved Hospital Problems   No resolved problems to display.     DARCI due to volume depletion from high ostomy output  CKD stage 3  Hypomagnesemia  Hypokalemia  Acidosis  No NSAIDs, ACEI/ARB, IV contrast or other nephrotoxins.  Keep MAP > 60, SBP > 100.  Dose meds for GFR < 30 ml/min.  Agree with IVF PRN, replete electrolytes as needed.  Groshong and TPN for several more weeks before  closing the ostomy. Clear liquid diet or NPO based on output.  Keep KCL to 40 BID, liquid and oral Mg as tolerated.   D/w Dr. Kearns.  Appreciate GI and Anaesthesia input.    Anemia of CKD  Hgb and HCT are acceptable. Monitor.  Will provide ADAM and/or IV iron PRN.    MBD / Secondary HPT  PTH is high. Continue calcitriol.    HTN, now hypotension  BP seem controlled.   Tolerate asymptomatic HTN up to -160.  Hold home meds.    Thank you for allowing us to participate in the care of your patient!   We will follow the patient and provide recommendations as needed.    Jorge Webb MD    [facility]  [address] [place] [address]  [place] [id number] [phone] - tel  [phone] - fax    2/29/2020 10:50 AM

## 2020-02-29 NOTE — PROGRESS NOTES
[facility]  Progress Note    Patient Name: Shara Hutchins  MRN: [id number]  Patient Class: IP- Inpatient   Admission Date: 2/23/2020  Length of Stay: 4 days  Attending Physician: Aleksander Phan MD  Primary Care Provider: April Horton MD        Subjective:     Principal Problem:Severe dehydration    Interval History:   Patient is more awake and alert, patient got femoral line and got blood transfusion from it  Ileostomy output decreased to 1 L over previous 24    [place] of Systems  Objective:     Vital Signs (Most Recent):  Temp: 98.2 °F (36.8 °C) (02/29/20 1100)  Pulse: 62 (02/29/20 1100)  Resp: 18 (02/29/20 1100)  BP: (!) 126/58 (02/29/20 1100)  SpO2: 98 % (02/29/20 1100) Vital Signs (24h Range):  Temp:  [97.7 °F (36.5 °C)-98.5 °F (36.9 °C)] 98.2 °F (36.8 °C)  Pulse:  [61-72] 62  Resp:  [17-18] 18  SpO2:  [95 %-98 %] 98 %  BP: (105-126)/(55-68) 126/58     Weight: 51.1 kg (112 lb 11.2 oz)  Body mass index is 17.65 kg/m².    Intake/Output Summary (Last 24 hours) at 2/29/2020 1444  Last data filed at 2/29/2020 1000  Gross per 24 hour   Intake 1644.67 ml   Output 3000 ml   Net -1355.33 ml      Physical Exam:  General- Patient alert and oriented x3 in NAD  HEENT- PERRLA, EOMI, OP clear, MMM  Neck- No JVD, Lymphadenopathy, Thyromegaly  CV- Regular rate and rhythm, No Murmur/jackie/rubs  Resp- Lungs CTA Bilaterally, No increased WOB  GI- Non tender BS normoactive x4 quads  Extrem- No cyanosis, clubbing, edema  Neuro- Strength 5/5 flexors/extensors,Skin-  No masses, rashes or lesions noted on cursory skin exam.    Overview/Hospital Course:     Significant Labs:   CBC:   Recent Labs   Lab 02/28/20  0419 02/29/20  0440   WBC 7.84 7.41   HGB 7.9* 7.7*   HCT 26.4* 25.3*    157     CMP:   Recent Labs   Lab 02/28/20  0419 02/29/20  0440   * 134*   K 4.4 3.8   CL 93* 95   CO2 34* 32*   GLU 90 107   BUN 36* 41*   CREATININE 4.4* 4.3*   CALCIUM 7.9* 7.8*   ANIONGAP 8 7*   EGFRNONAA  9.6* 9.9*       Significant Imaging: I have reviewed all pertinent imaging results/findings within the past 24 hours.    Assessment/Plan:      Severe dehydration with hypotension  secondary to High output iliostomy;   Also possible Due to chemoradiation therapy induced chronic diarrhea worsened after ileostomy.     TPN and NPO , IVF , may try some liquid later in the day    Metamucil ,Loperamide 4mg QID. , codeine tab  protonix IV   Questran,Sandostatin infusion   Follow-up GI, General surgery  Patient most likely going to need skilled nursing facility       Malnutrition with  weight loss of 20 lb in 1 week  Start  TPN     Lactic Acidosis secondary to dehydration , no sign of infection -resolved.C diff and blood cultures neg  Continue  antibiotics  given the cause could be intra-abdominal sepsis, enteritis    DARCI on CKD V:   Continue IV fluids high dose   Avoid nephrotoxins  Avoid NSAID, Ace inhibitors, arbs  Renal dose all medications      Vulvar cancer with skin excoriation due to XRT   wound care       Hypothyroidism:   continue Levothyroxine.     Worsening chronic Anemia s/p blood transfusion   Pt was on PRN IV iron Rx prior to admission   Monitor and may need transfusion soon     Deconditioning:   Hypomagnesemia-resolved   DNR status        ________________________________________________________________________________          Active Diagnoses:    Diagnosis Date Noted POA    PRINCIPAL PROBLEM:  Severe dehydration due to chronic diarrhea/high output ileostomy [E86.0] 02/24/2020 Yes    DNR (do not resuscitate) [Z66] 02/24/2020 Yes    Electrolyte imbalance [E87.8] 02/24/2020 Yes    Severe malnutrition [E43] 02/24/2020 Yes    DARCI (acute kidney injury) [N17.9] 02/23/2020 Yes      Problems Resolved During this Admission:     VTE Risk Mitigation (From admission, onward)         Ordered     IP VTE HIGH RISK PATIENT  Once      02/23/20 2025     Reason for No Pharmacological VTE Prophylaxis  Once     Question:   Reasons:  Answer:  Risk of Bleeding    02/23/20 2025                   Aleksander Phan MD  Department of Hospital Medicine   [facility]

## 2020-02-29 NOTE — PLAN OF CARE
Problem: Fall Injury Risk  Goal: Absence of Fall and Fall-Related Injury  Outcome: Ongoing, Progressing     Problem: Adult Inpatient Plan of Care  Goal: Plan of Care Review  Outcome: Ongoing, Progressing  Goal: Patient-Specific Goal (Individualization)  Outcome: Ongoing, Progressing  Goal: Absence of Hospital-Acquired Illness or Injury  Outcome: Ongoing, Progressing  Goal: Optimal Comfort and Wellbeing  Outcome: Ongoing, Progressing  Goal: Readiness for Transition of Care  Outcome: Ongoing, Progressing  Goal: Rounds/Family Conference  Outcome: Ongoing, Progressing     Problem: Infection  Goal: Infection Symptom Resolution  Outcome: Ongoing, Progressing     Problem: Skin Injury Risk Increased  Goal: Skin Health and Integrity  Outcome: Ongoing, Progressing     Problem: Wound  Goal: Optimal Wound Healing  Outcome: Ongoing, Progressing     Problem: Malnutrition  Goal: Improved Nutritional Intake  Outcome: Ongoing, Progressing

## 2020-02-29 NOTE — PLAN OF CARE
Problem: Malnutrition  Goal: Improved Nutritional Intake  2/29/2020 1311 by Sandra Lovell RD  Outcome: Ongoing, Progressing  2/29/2020 1311 by Sandra Lovell RD  Outcome: Ongoing, Progressing  Intervention: Promote and Optimize Nutrition Support  Flowsheets (Taken 2/29/2020 1311)  Nutrition Support Management: other (see comments) (clinimix and lipids continue)     Problem: Parenteral Nutrition  Goal: Effective Intravenous Nutrition Therapy Delivery  Outcome: Ongoing, Progressing  Intervention: Optimize Intravenous Nutrition Delivery  Flowsheets (Taken 2/29/2020 1311)  Nutrition Support Management: other (see comments) (clinimix and lipids continue)

## 2020-02-29 NOTE — ANESTHESIA PROCEDURE NOTES
Central Line    Diagnosis: short gut syndrome  Patient location during procedure: floor  Procedure start time: 2/28/2020 8:55 PM  Timeout: 2/28/2020 8:55 PM  Procedure end time: 2/28/2020 8:55 PM    Staffing  Authorizing Provider: Moreno Gaming MD  Performing Provider: Moreno Gaming MD    Staffing  Anesthesiologist: Moreno Gaming MD  Performed: anesthesiologist   Anesthesiologist was present at the time of the procedure.  Preanesthetic Checklist  Completed: patient identified, site marked, surgical consent, pre-op evaluation, timeout performed, IV checked, risks and benefits discussed, monitors and equipment checked and anesthesia consent given  Indication   Indication: vascular access, med administration     Anesthesia   local infiltration    Central Line   Skin Prep: skin prepped with ChloraPrep, skin prep agent completely dried prior to procedure  maximum sterile barriers used during central venous catheter insertion  hand hygiene performed prior to central venous catheter insertion  Location: right, femoral.   Catheter type: triple lumen  Catheter Size: 7 Fr  Inserted Catheter Length: 20 cm  Rationale: unable to gain access to other sites  Ultrasound: vascular probe with ultrasound  Vessel Caliber: small, patent, compressibility normal  Needle advanced into vessel with real time Ultrasound guidance.  Guidewire confirmed in vessel.  Image recorded and saved.  Manometry: none  Insertion Attempts: 1   Securement:line sutured, chlorhexidine patch, sterile dressing applied and blood return through all ports    Post-Procedure   X-Ray: placement verified by x-ray  Adverse Events:none    Guidewire Guidewire removed intact.

## 2020-02-29 NOTE — PT/OT/SLP PROGRESS
Physical Therapy      Patient Name:  Shara Hutchins   MRN:  [id number]    Patient not seen today secondary to Other (Comment). Pt with critical low H&H. Possible transfusion today. Will follow-up 3/2/2020 .    Namita Rivas, PT

## 2020-03-01 LAB
ANION GAP SERPL CALC-SCNC: 5 MMOL/L (ref 8–16)
BASOPHILS # BLD AUTO: 0.02 K/UL (ref 0–0.2)
BASOPHILS NFR BLD: 0.4 % (ref 0–1.9)
BUN SERPL-MCNC: 45 MG/DL (ref 8–23)
CALCIUM SERPL-MCNC: 8.2 MG/DL (ref 8.7–10.5)
CHLORIDE SERPL-SCNC: 98 MMOL/L (ref 95–110)
CO2 SERPL-SCNC: 33 MMOL/L (ref 23–29)
CREAT SERPL-MCNC: 4 MG/DL (ref 0.5–1.4)
DIFFERENTIAL METHOD: ABNORMAL
EOSINOPHIL # BLD AUTO: 0.4 K/UL (ref 0–0.5)
EOSINOPHIL NFR BLD: 7.8 % (ref 0–8)
ERYTHROCYTE [DISTWIDTH] IN BLOOD BY AUTOMATED COUNT: 17.7 % (ref 11.5–14.5)
EST. GFR  (AFRICAN AMERICAN): 12.4 ML/MIN/1.73 M^2
EST. GFR  (NON AFRICAN AMERICAN): 10.8 ML/MIN/1.73 M^2
GLUCOSE SERPL-MCNC: 100 MG/DL (ref 70–110)
HCT VFR BLD AUTO: 29 % (ref 37–48.5)
HGB BLD-MCNC: 9.1 G/DL (ref 12–16)
IMM GRANULOCYTES # BLD AUTO: 0.01 K/UL (ref 0–0.04)
IMM GRANULOCYTES NFR BLD AUTO: 0.2 % (ref 0–0.5)
LYMPHOCYTES # BLD AUTO: 0.7 K/UL (ref 1–4.8)
LYMPHOCYTES NFR BLD: 14.9 % (ref 18–48)
MAGNESIUM SERPL-MCNC: 1.7 MG/DL (ref 1.6–2.6)
MCH RBC QN AUTO: 31.3 PG (ref 27–31)
MCHC RBC AUTO-ENTMCNC: 31.4 G/DL (ref 32–36)
MCV RBC AUTO: 100 FL (ref 82–98)
MONOCYTES # BLD AUTO: 0.3 K/UL (ref 0.3–1)
MONOCYTES NFR BLD: 6.9 % (ref 4–15)
NEUTROPHILS # BLD AUTO: 3.2 K/UL (ref 1.8–7.7)
NEUTROPHILS NFR BLD: 69.8 % (ref 38–73)
NRBC BLD-RTO: 0 /100 WBC
PLATELET # BLD AUTO: 116 K/UL (ref 150–350)
PMV BLD AUTO: 11.4 FL (ref 9.2–12.9)
POTASSIUM SERPL-SCNC: 3.7 MMOL/L (ref 3.5–5.1)
RBC # BLD AUTO: 2.91 M/UL (ref 4–5.4)
SODIUM SERPL-SCNC: 136 MMOL/L (ref 136–145)
VANCOMYCIN SERPL-MCNC: 15.9 UG/ML
WBC # BLD AUTO: 4.63 K/UL (ref 3.9–12.7)

## 2020-03-01 PROCEDURE — 97110 THERAPEUTIC EXERCISES: CPT | Mod: CO

## 2020-03-01 PROCEDURE — B4185 PARENTERAL SOL 10 GM LIPIDS: HCPCS | Performed by: INTERNAL MEDICINE

## 2020-03-01 PROCEDURE — 80048 BASIC METABOLIC PNL TOTAL CA: CPT

## 2020-03-01 PROCEDURE — 25000003 PHARM REV CODE 250: Performed by: SURGERY

## 2020-03-01 PROCEDURE — 63600175 PHARM REV CODE 636 W HCPCS: Performed by: SURGERY

## 2020-03-01 PROCEDURE — 25000003 PHARM REV CODE 250: Performed by: INTERNAL MEDICINE

## 2020-03-01 PROCEDURE — 85025 COMPLETE CBC W/AUTO DIFF WBC: CPT

## 2020-03-01 PROCEDURE — 83735 ASSAY OF MAGNESIUM: CPT

## 2020-03-01 PROCEDURE — 80202 ASSAY OF VANCOMYCIN: CPT

## 2020-03-01 PROCEDURE — 63600175 PHARM REV CODE 636 W HCPCS: Performed by: INTERNAL MEDICINE

## 2020-03-01 PROCEDURE — C9113 INJ PANTOPRAZOLE SODIUM, VIA: HCPCS | Performed by: SURGERY

## 2020-03-01 PROCEDURE — A4217 STERILE WATER/SALINE, 500 ML: HCPCS | Performed by: INTERNAL MEDICINE

## 2020-03-01 PROCEDURE — 21400001 HC TELEMETRY ROOM

## 2020-03-01 RX ORDER — DIPHENHYDRAMINE HYDROCHLORIDE 50 MG/ML
6.25 INJECTION INTRAMUSCULAR; INTRAVENOUS EVERY 6 HOURS
Status: DISCONTINUED | OUTPATIENT
Start: 2020-03-01 | End: 2020-03-06 | Stop reason: HOSPADM

## 2020-03-01 RX ADMIN — PANTOPRAZOLE SODIUM 40 MG: 40 INJECTION, POWDER, LYOPHILIZED, FOR SOLUTION INTRAVENOUS at 08:03

## 2020-03-01 RX ADMIN — DRONABINOL 2.5 MG: 2.5 CAPSULE ORAL at 04:03

## 2020-03-01 RX ADMIN — PANTOPRAZOLE SODIUM 40 MG: 40 INJECTION, POWDER, LYOPHILIZED, FOR SOLUTION INTRAVENOUS at 09:03

## 2020-03-01 RX ADMIN — LOPERAMIDE HYDROCHLORIDE 4 MG: 2 CAPSULE ORAL at 04:03

## 2020-03-01 RX ADMIN — CHOLESTYRAMINE 4 G: 4 POWDER, FOR SUSPENSION ORAL at 09:03

## 2020-03-01 RX ADMIN — POTASSIUM CHLORIDE 40 MEQ: 1.5 SOLUTION ORAL at 08:03

## 2020-03-01 RX ADMIN — LOPERAMIDE HYDROCHLORIDE 4 MG: 2 CAPSULE ORAL at 08:03

## 2020-03-01 RX ADMIN — DIPHENHYDRAMINE HYDROCHLORIDE 6.25 MG: 50 INJECTION, SOLUTION INTRAMUSCULAR; INTRAVENOUS at 07:03

## 2020-03-01 RX ADMIN — MORPHINE SULFATE 2 MG: 2 INJECTION, SOLUTION INTRAMUSCULAR; INTRAVENOUS at 04:03

## 2020-03-01 RX ADMIN — DIPHENHYDRAMINE HYDROCHLORIDE 6.25 MG: 50 INJECTION, SOLUTION INTRAMUSCULAR; INTRAVENOUS at 12:03

## 2020-03-01 RX ADMIN — MEROPENEM 500 MG: 500 INJECTION, POWDER, FOR SOLUTION INTRAVENOUS at 08:03

## 2020-03-01 RX ADMIN — PSYLLIUM HUSK 2 PACKET: 3.4 POWDER ORAL at 03:03

## 2020-03-01 RX ADMIN — MAGNESIUM OXIDE 400 MG: 400 TABLET ORAL at 09:03

## 2020-03-01 RX ADMIN — PSYLLIUM HUSK 2 PACKET: 3.4 POWDER ORAL at 10:03

## 2020-03-01 RX ADMIN — SODIUM CHLORIDE, POTASSIUM CHLORIDE, SODIUM LACTATE AND CALCIUM CHLORIDE: 600; 310; 30; 20 INJECTION, SOLUTION INTRAVENOUS at 11:03

## 2020-03-01 RX ADMIN — MORPHINE SULFATE 2 MG: 2 INJECTION, SOLUTION INTRAMUSCULAR; INTRAVENOUS at 10:03

## 2020-03-01 RX ADMIN — MAGNESIUM OXIDE 400 MG: 400 TABLET ORAL at 08:03

## 2020-03-01 RX ADMIN — POTASSIUM CHLORIDE 40 MEQ: 1.5 SOLUTION ORAL at 09:03

## 2020-03-01 RX ADMIN — LOPERAMIDE HYDROCHLORIDE 4 MG: 2 CAPSULE ORAL at 09:03

## 2020-03-01 RX ADMIN — SERTRALINE HYDROCHLORIDE 50 MG: 50 TABLET ORAL at 08:03

## 2020-03-01 RX ADMIN — PSYLLIUM HUSK 2 PACKET: 3.4 POWDER ORAL at 09:03

## 2020-03-01 RX ADMIN — SODIUM CHLORIDE, POTASSIUM CHLORIDE, SODIUM LACTATE AND CALCIUM CHLORIDE: 600; 310; 30; 20 INJECTION, SOLUTION INTRAVENOUS at 06:03

## 2020-03-01 RX ADMIN — LOPERAMIDE HYDROCHLORIDE 4 MG: 2 CAPSULE ORAL at 12:03

## 2020-03-01 RX ADMIN — CHOLESTYRAMINE 4 G: 4 POWDER, FOR SUSPENSION ORAL at 08:03

## 2020-03-01 RX ADMIN — SMOFLIPID: 6; 6; 5; 3 INJECTION, EMULSION INTRAVENOUS at 05:03

## 2020-03-01 NOTE — PROGRESS NOTES
Replaced by [facility]  Progress Note    Patient Name: Shara Hutchins  MRN: [id number]  Patient Class: IP- Inpatient   Admission Date: 2/23/2020  Length of Stay: 5 days  Attending Physician: Aleksander Phan MD  Primary Care Provider: April Horton MD        Subjective:     Principal Problem:Severe dehydration    Interval History:   Patient's is gaining weight and less and less drainage from the iliostomy  [place] [employer] Systems  Objective:     Vital Signs (Most Recent):  Temp: 98.2 °F (36.8 °C) (03/01/20 1534)  Pulse: (!) 58 (03/01/20 1534)  Resp: 20 (03/01/20 1534)  BP: 115/66 (03/01/20 1534)  SpO2: 98 % (03/01/20 1534) Vital Signs (24h Range):  Temp:  [98 °F (36.7 °C)-98.3 °F (36.8 °C)] 98.2 °F (36.8 °C)  Pulse:  [58-70] 58  Resp:  [16-20] 20  SpO2:  [97 %-100 %] 98 %  BP: (114-138)/(59-68) 115/66     Weight: 54 kg (119 lb 1.6 oz)  Body mass index is 18.65 kg/m².    Intake/Output Summary (Last 24 hours) at 3/1/2020 1548  Last data filed at 3/1/2020 1439  Gross per 24 hour   Intake 800 ml   Output 4850 ml   Net -4050 ml      Physical Exam:  General- Patient alert and oriented x3 in NAD  HEENT- PERRLA, EOMI, OP clear, MMM  Neck- No JVD, Lymphadenopathy, Thyromegaly  CV- Regular rate and rhythm, No Murmur/jackie/rubs  Resp- Lungs CTA Bilaterally, No increased WOB  GI- Non tender BS normoactive x4 quads  Extrem- No cyanosis, clubbing, edema  Neuro- Strength 5/5 flexors/extensors,Skin-  No masses, rashes or lesions noted on cursory skin exam.    Overview/Hospital Course:     Significant Labs:   CBC:   Recent Labs   Lab 02/29/20  0440 03/01/20  0522   WBC 7.41 4.63   HGB 7.7* 9.1*   HCT 25.3* 29.0*    116*     CMP:   Recent Labs   Lab 02/29/20  0440 03/01/20  0522   * 136   K 3.8 3.7   CL 95 98   CO2 32* 33*    100   BUN 41* 45*   CREATININE 4.3* 4.0*   CALCIUM 7.8* 8.2*   ANIONGAP 7* 5*   EGFRNONAA 9.9* 10.8*       Significant Imaging: I have reviewed all pertinent imaging  results/findings within the past 24 hours.    Assessment/Plan:      Severe dehydration with hypotension  secondary to High output iliostomy;   Also possible Due to chemoradiation therapy induced chronic diarrhea worsened after ileostomy.     TPN  , IVF and DC clinimix when TPN ready   Metamucil ,Loperamide 4mg QID. , codeine tab  protonix IV   Questran,Sandostatin infusion   Follow-up GI, General surgery  Patient most likely going to need skilled nursing facility       Malnutrition with  weight loss of 20 lb in 1 week  Start  TPN when line ready.     Lactic Acidosis secondary to dehydration , no sign of infection -resolved.C diff and blood cultures neg  Continue  antibiotics  given the cause could be intra-abdominal sepsis, enteritis    DARCI on CKD V:   Continue IV fluids high dose   Avoid nephrotoxins  Avoid NSAID, Ace inhibitors, arbs  Renal dose all medications      Vulvar cancer with skin excoriation due to XRT   wound care       Hypothyroidism:   continue Levothyroxine.     Worsening chronic Anemia s/p blood transfusion   Pt was on PRN IV iron Rx prior to admission   Monitor and may need transfusion soon     Deconditioning:   Hypomagnesemia-resolved   DNR status        ________________________________________________________________________________          Active Diagnoses:    Diagnosis Date Noted POA    PRINCIPAL PROBLEM:  Severe dehydration due to chronic diarrhea/high output ileostomy [E86.0] 02/24/2020 Yes    DNR (do not resuscitate) [Z66] 02/24/2020 Yes    Electrolyte imbalance [E87.8] 02/24/2020 Yes    Severe malnutrition [E43] 02/24/2020 Yes    DARCI (acute kidney injury) [N17.9] 02/23/2020 Yes      Problems Resolved During this Admission:     VTE Risk Mitigation (From admission, onward)         Ordered     IP VTE HIGH RISK PATIENT  Once      02/23/20 2025     Reason for No Pharmacological VTE Prophylaxis  Once     Question:  Reasons:  Answer:  Risk of Bleeding    02/23/20 2025                   Aleksander PARSONS  MD Emilie  Department of Hospital Medicine   [facility]

## 2020-03-01 NOTE — PLAN OF CARE
Problem: Malnutrition  Goal: Improved Nutritional Intake  Outcome: Ongoing, Progressing  Intervention: Promote and Optimize Nutrition Support  Flowsheets (Taken 3/1/2020 1307)  Nutrition Support Management: parenteral nutrition initiated     Problem: Parenteral Nutrition  Goal: Effective Intravenous Nutrition Therapy Delivery  Outcome: Ongoing, Progressing  Intervention: Optimize Intravenous Nutrition Delivery  Flowsheets (Taken 3/1/2020 1307)  Nutrition Support Management: parenteral nutrition initiated

## 2020-03-01 NOTE — PT/OT/SLP PROGRESS
Occupational Therapy   Treatment    Name: Shara Hutchins  MRN: [id number]  Admitting Diagnosis:  Severe dehydration  2 Days Post-Op    Recommendations:     Discharge Recommendations: home health OT  Discharge Equipment Recommendations:  none  Barriers to discharge:  None    Assessment:     Shara Hutchins is a 69 y.o. female with a medical diagnosis of Severe dehydration.  She presents with decreased opportunity for OOB activity 2* IV placement and is motivated to participate. Performance deficits affecting function are weakness, impaired endurance, impaired self care skills.     Rehab Prognosis:  Good; patient would benefit from acute skilled OT services to address these deficits and reach maximum level of function.       Plan:     Patient to be seen 3 x/week to address the above listed problems via self-care/home management, therapeutic activities, therapeutic exercises  · Plan of Care Expires: 03/25/20  · Plan of Care Reviewed with: patient    Subjective     Pain/Comfort:  · Pain Rating 1: 0/10    Objective:     Communicated with: Shakila CAREY prior to session.  Patient found HOB elevated with colostomy, lee catheter, peripheral IV, telemetry upon OT entry to room.    General Precautions: Standard, fall   Orthopedic Precautions:N/A   Braces: N/A     Occupational Performance:     Treatment & Education:  TherEx with HOB raised 45* using yellow tband 3 x 10 of each with 1 min rest break between exercises: horizontal abd, L/R external rotation and L/R internal rotation; pt demos exercises appropriately and agrees to perform twice daily and as much as she wants after that.    Patient left HOB elevated with all lines intact, call button in reach and Shakila CAREY notifiedEducation:      GOALS:   Multidisciplinary Problems     Occupational Therapy Goals        Problem: Occupational Therapy Goal    Goal Priority Disciplines Outcome Interventions   Occupational Therapy Goal     OT, PT/OT Ongoing, Progressing    Description:  Goals to be  met by: discharge    Patient will increase functional independence with ADLs by performing:    Pt to be independent with UE HEP program of 3 x 10 reps each for reconditioning  UE Dressing with Supervision.  LE Dressing with Supervision.  Grooming while standing with Modified [place].  Toileting from toilet with Modified [place] for hygiene and clothing management.   Toilet transfer to toilet with Supervision.                       Time Tracking:     OT Date of Treatment: 03/01/20  OT Start Time: 1132  OT Stop Time: 1200  OT Total Time (min): 28 min    Billable Minutes:Therapeutic Exercise 28 min    DURGA Medrano  3/1/2020

## 2020-03-01 NOTE — CONSULTS
[facility]  Adult Nutrition   Progress Note (Nutrition Support Management)    SUMMARY     Recommendations  Recommendation/Intervention: 1. RD ordered TPN to be initiated today @ 1700. 2. Recommend diet be resumed as medically able. 3. NST to monitor and manage TPN daily and advance to meet estimated protein and calorie needs accordingly. 4. Blood glucose and electrolytes to be monitored and managed.  Goals: 1. Patient to tolerated TPN. 2. Patient to meet at least 75% of estimated needs via TPN provision. 3. Blood glucose and electrolytes to trend towards normal limits.   Nutrition Goal Status: new  Communication of RD Recs: reviewed with RN(reviewed with physician )    Dietitian Rounds Brief  TPN to be initiated today via femoral central line. RD discussed with Dr. Phan. Clinimix and lipids to be discontinued once TPN is initiated. Will continue to monitor long term nutritional plans. Possibility of diet being started. Follow and make recommendations and continue TPN accordingly.     PARENTERAL NUTRITION PROGRESS NOTE:    Parenteral Nutrition Day # 1  Diagnosis/Indication for PN: high ostomy output, severe malnutrition, expect TPN for 6 weeks   IV Access: Femoral central line   Diet/Tube Feeding: NPO         Admixture Type: 3-in-1 (15% AA, 70% dextrose, 20% smoflipid)   Infusion Rate and Frequency: 50 ml/hr, continuous    Patient Acuity: Acute Care     PN Composition:   51 grams Amino Acid, 119 grams Dextrose, and 50 grams Lipid.    Today's TPN provides 1109 kcal.  *Dextrose is started at less than full dextrose goal to reduce risk for Refeeding Syndrome. Dextrose content will be advanced as appropriate over the next few days.    Please see order for electrolytes and additives content and adjustments.   *The Nutrition Support Team will continue to monitor electrolytes daily and adjust parenteral nutrition as warranted.    Reason for Assessment  Reason For Assessment: consult, new TPN  Relevant  "Medical History: Vulvar cancer, Chronic kidney disease, stage III (moderate), DM2, fatigue, HTN, ileostomy (20), DARCI, Severe dehydration due to chronic diarrhea/high output ileostomy, electrolyte imbalance     Nutrition Risk Screen  Nutrition Risk Screen: other (see comments)(wt loss malabsorpation)       Pressure Injury 20 1837 Left Buttocks Stage 2-Wound Image: Images linked       Wound 20 Moisture associated dermatitis medial Perineum #1-Wound Image: Images linked  MST Score: 2  Have you recently lost weight without trying?: Yes: 14-23 lbs  Weight loss score: 2  Have you been eating poorly because of a decreased appetite?: No  Appetite score: 0       Nutrition/Diet History  Patient Reported Diet/Restrictions/Preferences: general  Spiritual, Cultural Beliefs, Jainism Practices, Values that Affect Care: no  Food Allergies: NKFA  Factors Affecting Nutritional Intake: decreased appetite, diarrhea, malabsorption, altered gastrointestinal function    Anthropometrics  Temp: 98.3 °F (36.8 °C)  Height Method: Stated  Height: 5' 7" (170.2 cm)  Height (inches): 67 in  Weight Method: Bed Scale  Weight: 54 kg (119 lb 1.6 oz)  Weight (lb): 119.1 lb  Ideal Body Weight (IBW), Female: 135 lb  % Ideal Body Weight, Female (lb): 85.57 %  BMI (Calculated): 18.6  BMI Grade: 17 - 18.4 protein-energy malnutrition grade I  Weight Loss: unintentional  Usual Body Weight (UBW), k.8 kg  Weight Change Amount: 28 lb  % Usual Body Weight: 80.17  % Weight Change From Usual Weight: -20 %       Weight History:  Wt Readings from Last 10 Encounters:   20 54 kg (119 lb 1.6 oz)   20 58.7 kg (129 lb 4.8 oz)   20 58.8 kg (129 lb 9.6 oz)   20 56.7 kg (125 lb)   20 56.9 kg (125 lb 6.4 oz)   20 57.2 kg (126 lb)   20 61.4 kg (135 lb 6.4 oz)   19 59 kg (130 lb)   19 61.8 kg (136 lb 4.8 oz)   19 63.8 kg (140 lb 10.5 oz)       Lab/Procedures/Meds: Pertinent Labs " Reviewed  Clinical Chemistry:  Recent Labs   Lab 02/23/20  1530 02/24/20  0350  02/26/20  0420 02/27/20  0417  02/29/20 0440 03/01/20  0522    139   < > 142 140   < > 134* 136   K 3.9 3.6   < > 3.9 4.9   < > 3.8 3.7   CL 79* 85*   < > 84* 92*   < > 95 98   CO2 40* 41*   < > 44* 35*   < > 32* 33*    96   < > 69* 111*   < > 107 100   BUN 36* 33*   < > 36* 32*   < > 41* 45*   CREATININE 3.9* 3.8*   < > 5.0* 4.3*   < > 4.3* 4.0*   CALCIUM 9.0 8.1*   < > 8.8 8.1*   < > 7.8* 8.2*   PROT 6.1 4.7*  --   --   --   --   --   --    ALBUMIN 3.7 2.9*  --   --   --   --   --   --    BILITOT 1.9* 1.6*  --   --   --   --   --   --    ALKPHOS 231* 168*  --   --   --   --   --   --    AST 35 25  --   --   --   --   --   --    ALT 25 20  --   --   --   --   --   --    ANIONGAP 25* 13   < > 14 13   < > 7* 5*   ESTGFRAFRICA 12.8* 13.2*   < > 9.5* 11.4*   < > 11.4* 12.4*   EGFRNONAA 11.1* 11.5*   < > 8.2* 9.9*   < > 9.9* 10.8*   MG 1.5* 2.7*   < > 2.3 1.8   < > 2.1 1.7   PHOS  --   --   --  5.3* 4.7*  --  3.9  --    LIPASE 41  --   --   --   --   --   --   --     < > = values in this interval not displayed.     CBC:   Recent Labs   Lab 03/01/20  0522   WBC 4.63   RBC 2.91*   HGB 9.1*   HCT 29.0*   *   *   MCH 31.3*   MCHC 31.4*     Lipid Panel:  Recent Labs   Lab 02/28/20  0419   TRIG 97     Thyroid & Parathyroid:  Recent Labs   Lab 02/23/20  1530 02/24/20  1415   TSH 7.180*  --    FREET4 1.16  --    [id number]  --  7.3   [id number]  --  44*     Medications: Pertinent Medications reviewed  Scheduled Meds:   cholestyramine-aspartame  1 packet Oral BID    diphenhydrAMINE  6.25 mg Intravenous Q6H    dronabinol  2.5 mg Oral BID AC    levothyroxine  88 mcg Oral QAM    loperamide  4 mg Oral QID    magnesium oxide  400 mg Oral BID    meropenem (MERREM) IVPB  500 mg Intravenous Daily    pantoprazole  40 mg Intravenous BID    potassium chloride 10%  40 mEq Oral BID    psyllium husk (with sugar)  2 packet Oral TID     sertraline  50 mg Oral Daily     Continuous Infusions:   amino acid 4.25 % in D5W Stopped (02/29/20 1700)    lactated ringers 200 mL/hr at 02/28/20 0615    octreotide (SANDOSTATIN) infusion 25 mcg/hr (02/29/20 1736)    TPN ADULT CENTRAL LINE CUSTOM (3 in 1)       PRN Meds:.sodium chloride, acetaminophen, calcium chloride IVPB, calcium chloride IVPB, calcium chloride IVPB, codeine, dextrose 50%, dextrose 50%, glucagon (human recombinant), glucose, glucose, HYDROcodone-acetaminophen, magnesium oxide, magnesium sulfate IVPB, magnesium sulfate IVPB, magnesium sulfate IVPB, magnesium sulfate IVPB, melatonin, morphine, ondansetron, potassium chloride in water, potassium chloride in water, potassium chloride in water, potassium chloride in water, potassium chloride, potassium chloride, potassium chloride, potassium chloride, sodium chloride 0.9%, sodium chloride 0.9%, sodium phosphate IVPB, sodium phosphate IVPB, sodium phosphate IVPB, sodium phosphate IVPB, sodium phosphate IVPB, Pharmacy to dose Vancomycin consult **AND** vancomycin - pharmacy to dose    Estimated/Assessed Needs    Weight Used For Calorie Calculations: 52.4 kg (115 lb 8.3 oz)  Energy Calorie Requirements (kcal): 9146-2889 kcals/day (35-40 kcals/kg)  Energy Need Method: Kcal/kg  Protein Requirements:  g/day (1.5-2.0 g/kg)  Weight Used For Protein Calculations: 52.4 kg (115 lb 8.3 oz)  Fluid Requirements (mL): 1572 (30mL/kg) or to maintain hydration   Estimated Fluid Requirement Method: other (see comments)  RDA Method (mL): 1834       Nutrition Prescription Ordered  Current Diet Order: NPO     Evaluation of Received Nutrient/Fluid Intake  Energy Calories Required: not meeting needs  Protein Required: meeting needs  Fluid Required: meeting needs  Tolerance: tolerating     Intake/Output Summary (Last 24 hours) at 3/1/2020 1301  Last data filed at 3/1/2020 1027  Gross per 24 hour   Intake --   Output 3750 ml   Net -3750 ml      % Intake of  Estimated Energy Needs: 0%  % Meal Intake: NPO    Nutrition Risk  Level of Risk/Frequency of Follow-up: high     Malnutrition Assessment  Malnutrition Type: chronic illness   ? Severe malnutrition in the context of chronic illness related to suspected malabsorption due to chronic diarrhea and high ostomy ileostomy output as evidence by patient states she eats fairly well at home but cannot eat enough to prevent weight loss, high ileostomy output per I/Os, RN and hospital problem list, severe dehydration and electrolyte imbalance, severe weight loss of 11.4 kg (17.87%) in < 3 months, and physical findings of mild fat depletion of orbitals and triceps; severe fat depletion of thoracic and lumbar region; severe muscle depletion of temples, clavicles, scapular region and interosseous; and generalized trace edema noted.     Monitor and Evaluation  Food and Nutrient Intake: food and beverage intake, energy intake, parenteral nutrition intake  Food and Nutrient Adminstration: diet order, enteral and parenteral nutrition administration  Physical Activity and Function: nutrition-related ADLs and IADLs, factors affecting access to physical activity  Anthropometric Measurements: weight change, weight, body mass index  Biochemical Data, Medical Tests and Procedures: gastrointestinal profile, electrolyte and renal panel, lipid profile, glucose/endocrine profile, inflammatory profile  Nutrition-Focused Physical Findings: overall appearance, extremities, muscles and bones, head and eyes, skin     Nutrition Follow-Up  RD Follow-up?: Yes    Sandra Lovell RD 03/01/2020 12:42 PM

## 2020-03-01 NOTE — PLAN OF CARE
Problem: Occupational Therapy Goal  Goal: Occupational Therapy Goal  Description  Goals to be met by: discharge    Patient will increase functional independence with ADLs by performing:    Pt to be independent with UE HEP program of 3 x 10 reps each for reconditioning  UE Dressing with Supervision.  LE Dressing with Supervision.  Grooming while standing with Modified [place].  Toileting from toilet with Modified [place] for hygiene and clothing management.   Toilet transfer to toilet with Supervision.      Outcome: Ongoing, Progressing

## 2020-03-01 NOTE — CONSULTS
Nephrology Consult Note        Patient Name: Shara Hutchins  MRN: [id number]    Patient Class: IP- Inpatient   Admission Date: 2/23/2020  Length of Stay: 5 days  Date of Service: 3/1/2020    Attending Physician: Aleksander Phan MD  Primary Care Provider: April Horton MD    Reason for Consult: darci/ckd3/hypovolemia/hypomagnesemia/shpt/anemia/htn    SUBJECTIVE:     HPI: 69F with HTN, DM2, hypothyroidism, secondary hyperparathyroidism, anemia, vulvar Ca (in remission), CKD3 presentswith abdominal pain, lightheadedness, vaginal bleeding. Had an ileostomy on 2/13, d/c on 2/17 (Dr. Meza) for chronic, debilitating diarrhea ? Pneumatosis intestinalis. Since then, she lost 20 lbs, has intermittent nausea and vomiting that is non-bloody and non-bilious, has high ostomy output - seeing whole pills at times. Pain to her abdominal incision site is worsening, had some lightheadedness when standing. Renal consulted for DARCI.    2/25 VSS, no new complains.  2/26 VSS, sCr worse today. UO+OO = > 5L today!!! Will have to step up IVF significantly to match loss + some....  2/27 VSS, remains to have high output from ostomy, agree with liquid diet and TPN as d/w Dr. Kearns, no PICC line but a tunneled [place], she is close to dialysis, has AVF in one arm...  2/28 VSS, ostomy output is down and UO is up, agree with liquid diet and TPN as d/w Dr. Kearns, no PICC line but a tunneled [place], she is close to dialysis, has AVF in one arm...  2/29 VSS, ostomy output is down and UO is up, agree with liquid diet and TPN as d/w Dr. Kearns. No PICC line but a tunneled [place], she is close to dialysis, has AVF in one arm...  3/1 VSS, no new complains. Femoral cath is suboptimal location for access that has to stay there for 4-6 weeks. Patient will have to be bed ridden and might require LTAC.    Past Medical History:   Diagnosis Date    Chronic kidney disease, stage III (moderate) 1/7/2018    Diabetes mellitus, type 2     Fatigue      Hypertension     Iron deficiency anemia due to chronic blood loss 2018    Vulvar cancer 2019     Past Surgical History:   Procedure Laterality Date    ABDOMINAL SURGERY      BACK SURGERY      CERVICAL FUSION      x 4    cervix repair      CHOLECYSTECTOMY      DIAGNOSTIC LAPAROSCOPY N/A 2020    Procedure: LAPAROSCOPY, DIAGNOSTIC;  Surgeon: Robbi Lovell III, MD;  Location: [facility];  Service: General;  Laterality: N/A;    HYSTERECTOMY  1980    ILEOSTOMY N/A 2020    Procedure: CREATION, ILEOSTOMY Loop;  Surgeon: Robbi Lovell III, MD;  Location: [facility] OR;  Service: General;  Laterality: N/A;    LUMBAR LAMINECTOMY      LYSIS OF ADHESIONS N/A 2020    Procedure: LYSIS, ADHESIONS;  Surgeon: Robbi Lovell III, MD;  Location: [facility] OR;  Service: General;  Laterality: N/A;    NECK SURGERY      PHLEBOGRAPHY  2020    Procedure: VENOGRAM;  Surgeon: Robbi Lovell III, MD;  Location: [facility];  Service: General;;    REMOVAL OF VASCULAR ACCESS PORT Right 2020    Procedure: REMOVAL, VASCULAR ACCESS PORT;  Surgeon: Robbi Lovell III, MD;  Location: [facility];  Service: General;  Laterality: Right;    SHOULDER SURGERY      vulva cancer       Family History   Problem Relation Age of Onset    Heart disease Mother     Hypertension Mother     Cancer Mother         lung    Heart disease Father     Heart disease Sister         Open heart surgery    No Known Problems Daughter      Social History     Tobacco Use    Smoking status: Former Smoker     Packs/day: 1.00     Years: 33.00     Pack years: 33.00     Types: Cigarettes     Last attempt to quit: 2000     Years since quittin.6    Smokeless tobacco: Never Used   Substance Use Topics    Alcohol use: No    Drug use: No       Review of patient's allergies indicates:   Allergen Reactions    Ciprofloxacin Other (See Comments)     Elevated liver enzymes      Pcn [penicillins] Anaphylaxis        Outpatient meds:  No current facility-administered medications on file prior to encounter.      Current Outpatient Medications on File Prior to Encounter   Medication Sig Dispense Refill    calcitRIOL (ROCALTROL) 0.5 MCG Cap Take 1 capsule by mouth once daily.  3    dicyclomine (BENTYL) 10 MG capsule TAKE 1 CAPSULE THREE TIMES A DAY (Patient taking differently: Take 10 mg by mouth 3 (three) times daily. ) 270 capsule 4    diphenhydrAMINE (BENADRYL) 25 mg capsule Take 25 mg by mouth nightly as needed for Itching.      dronabinol (MARINOL) 2.5 MG capsule Take 1 capsule (2.5 mg total) by mouth 2 (two) times daily before meals. 60 capsule 0    HYDROcodone-acetaminophen (NORCO)  mg per tablet Take 1 tablet by mouth every 6 (six) hours as needed for Pain. 30 tablet 0    levothyroxine (SYNTHROID) 88 MCG tablet TAKE 1 TABLET DAILY (Patient taking differently: Take 88 mcg by mouth every morning. ) 90 tablet 4    magnesium oxide (MAG-OX) 400 mg (241.3 mg magnesium) tablet Take 400 mg by mouth 2 (two) times daily.      potassium chloride (KLOR-CON) 10 MEQ TbSR Take 20 mEq by mouth 2 (two) times daily.       promethazine (PHENERGAN) 25 MG tablet Take 1 tablet (25 mg total) by mouth daily as needed for Nausea. 90 tablet 0    sertraline (ZOLOFT) 50 MG tablet Take 50 mg by mouth once daily.       sodium bicarbonate 650 MG tablet Take 650 mg by mouth 3 (three) times daily.   2       Scheduled meds:   cholestyramine-aspartame  1 packet Oral BID    dronabinol  2.5 mg Oral BID AC    levothyroxine  88 mcg Oral QAM    loperamide  4 mg Oral QID    magnesium oxide  400 mg Oral BID    meropenem (MERREM) IVPB  500 mg Intravenous Daily    pantoprazole  40 mg Intravenous BID    potassium chloride 10%  40 mEq Oral BID    psyllium husk (with sugar)  2 packet Oral TID    sertraline  50 mg Oral Daily       Infusions:   amino acid 4.25 % in D5W Stopped (02/29/20 1700)    lactated ringers 200 mL/hr at 02/28/20  0615    octreotide (SANDOSTATIN) infusion 25 mcg/hr (02/29/20 1736)       PRN meds:  sodium chloride, acetaminophen, calcium chloride IVPB, calcium chloride IVPB, calcium chloride IVPB, codeine, dextrose 50%, dextrose 50%, glucagon (human recombinant), glucose, glucose, HYDROcodone-acetaminophen, magnesium oxide, magnesium sulfate IVPB, magnesium sulfate IVPB, magnesium sulfate IVPB, magnesium sulfate IVPB, melatonin, morphine, ondansetron, potassium chloride in water, potassium chloride in water, potassium chloride in water, potassium chloride in water, potassium chloride, potassium chloride, potassium chloride, potassium chloride, sodium chloride 0.9%, sodium chloride 0.9%, sodium phosphate IVPB, sodium phosphate IVPB, sodium phosphate IVPB, sodium phosphate IVPB, sodium phosphate IVPB, Pharmacy to dose Vancomycin consult **AND** vancomycin - pharmacy to dose    Review of Systems:  ROS    OBJECTIVE:     Vital Signs and IO (Last 24H):  Temp:  [97.9 °F (36.6 °C)-98.3 °F (36.8 °C)]   Pulse:  [62-70]   Resp:  [18-19]   BP: (114-138)/(58-68)   SpO2:  [98 %-100 %]   I/O last 3 completed shifts:  In: 984.7 [I.V.:78.8; Blood:315]  Out: 3050 [Urine:2650; Stool:400]    Wt Readings from Last 5 Encounters:   03/01/20 54 kg (119 lb 1.6 oz)   02/19/20 58.7 kg (129 lb 4.8 oz)   02/19/20 58.8 kg (129 lb 9.6 oz)   02/14/20 56.7 kg (125 lb)   02/05/20 56.9 kg (125 lb 6.4 oz)         Physical Exam:  Physical Exam   Constitutional: She is oriented to person, place, and time. She appears well-developed and well-nourished.   HENT:   Head: Normocephalic and atraumatic.   Mouth/Throat: Oropharynx is clear and moist.   Eyes: Pupils are equal, round, and reactive to light. EOM are normal. No scleral icterus.   Neck: Neck supple.   Cardiovascular: Normal rate and regular rhythm.   Pulmonary/Chest: Effort normal. No stridor. No respiratory distress.   Abdominal: Soft. She exhibits no distension.   Musculoskeletal: Normal range of motion.  She exhibits no edema or deformity.   Neurological: She is alert and oriented to person, place, and time. No cranial nerve deficit.   Skin: Skin is warm and dry. No rash noted. She is not diaphoretic. No erythema.   Psychiatric: She has a normal mood and affect. Her behavior is normal.       Body mass index is 18.65 kg/m².    Laboratory:  Recent Labs   Lab 02/28/20 0419 02/29/20 0440 03/01/20  0522   * 134* 136   K 4.4 3.8 3.7   CL 93* 95 98   CO2 34* 32* 33*   BUN 36* 41* 45*   CREATININE 4.4* 4.3* 4.0*   ESTGFRAFRICA 11.1* 11.4* 12.4*   EGFRNONAA 9.6* 9.9* 10.8*   GLU 90 107 100       Recent Labs   Lab 02/23/20  1530 02/24/20  0350  02/26/20  0420 02/27/20 0417 02/28/20 0419 02/29/20 0440 03/01/20  0522   CALCIUM 9.0 8.1*   < > 8.8 8.1* 7.9* 7.8* 8.2*   ALBUMIN 3.7 2.9*  --   --   --   --   --   --    PHOS  --   --   --  5.3* 4.7*  --  3.9  --    MG 1.5* 2.7*   < > 2.3 1.8 1.5* 2.1 1.7    < > = values in this interval not displayed.       Recent Labs   Lab 09/30/19  0820 11/15/19  0748   PTH, Intact 176.4 H 238.4 H       No results for input(s): POCTGLUCOSE in the last 168 hours.    Recent Labs   Lab 07/11/19  0806 11/15/19  0748 01/22/20  0848   Hemoglobin A1C 5.1 Invalid A SEE COMMENT       Recent Labs   Lab 02/28/20 0419 02/29/20 0440 03/01/20  0522   WBC 7.84 7.41 4.63   HGB 7.9* 7.7* 9.1*   HCT 26.4* 25.3* 29.0*    157 116*   * 107* 100*   MCHC 29.9* 30.4* 31.4*   MONO 4.7  0.4 5.5  0.4 6.9  0.3       Recent Labs   Lab 02/23/20  1530 02/24/20  0350   BILITOT 1.9* 1.6*   PROT 6.1 4.7*   ALBUMIN 3.7 2.9*   ALKPHOS 231* 168*   ALT 25 20   AST 35 25       Recent Labs   Lab 02/10/20  2119 02/23/20  1715   Color, UA Yellow Yellow   Appearance, UA Hazy A Clear   pH, UA 6.0 >8.0 A   Specific [place], UA 1.010 1.010   Protein, UA Trace A 1+ A   Glucose, UA Negative Negative   Ketones, UA Negative Negative   Urobilinogen, UA Negative Negative   Bilirubin (UA) Negative Negative   Occult  Blood UA 2+ A Negative   Nitrite, UA Negative Negative   RBC, UA 5 H 4   WBC, UA 38 H 19 H   Bacteria Negative Negative   Hyaline Casts, UA 29 A 19 A             Microbiology Results (last 7 days)     Procedure Component Value Units Date/Time    Blood culture #1 **CANNOT BE ORDERED STAT** [[id number]] Collected:  02/23/20 1530    Order Status:  Completed Specimen:  Blood from Peripheral, Upper Arm, Right Updated:  02/28/20 1632     Blood Culture, Routine No growth after 5 days.    Blood culture #2 **CANNOT BE ORDERED STAT** [[id number]] Collected:  02/23/20 1545    Order Status:  Completed Specimen:  Blood from Peripheral, Upper Arm, Right Updated:  02/28/20 1632     Blood Culture, Routine No growth after 5 days.    Urine culture [[id number]] Collected:  02/23/20 1715    Order Status:  Completed Specimen:  Urine Updated:  02/26/20 0947     Urine Culture, Routine No growth    Narrative:       Preferred Collection Type->Urine, Clean Catch  Specimen Source->Urine    Clostridium difficile EIA [[id number]] Collected:  02/23/20 2124    Order Status:  Completed Specimen:  Stool Updated:  02/23/20 2329     C. diff Antigen Negative     C difficile Toxins A+B, EIA Negative     Comment: Testing not recommended for children <24 months old.       Narrative:       Recoll. [id number] by CD3 at 02/23/2020 20:57, reason: Container not   labeled          ASSESSMENT/PLAN:     Active Hospital Problems    Diagnosis  POA    *Severe dehydration due to chronic diarrhea/high output ileostomy [E86.0]  Yes    DNR (do not resuscitate) [Z66]  Yes    Electrolyte imbalance [E87.8]  Yes    Severe malnutrition [E43]  Yes    DARCI (acute kidney injury) [N17.9]  Yes      Resolved Hospital Problems   No resolved problems to display.     DARCI due to volume depletion from high ostomy output  CKD stage 3-4, has AVF in place  Hypomagnesemia  Hypokalemia  Acidosis  No NSAIDs, ACEI/ARB, IV contrast or other nephrotoxins.  Keep MAP > 60, SBP > 100.  Dose meds  for GFR < 30 ml/min.  IVF PRN, replete electrolytes as needed.  Clear liquid diet or NPO based on output.   Keep KCL to 40 BID, liquid and oral Mg as tolerated.   Plan for TPN for several more weeks before closing the ostomy.   Appreciate GI and Anaesthesia input. Femoral cath is suboptimal location for access if it has to stay there for 4-6 weeks. Patient will have to be bed ridden and might require LTAC.    Anemia of CKD  Hgb and HCT are acceptable. Monitor.  Will provide ADAM and/or IV iron PRN.    MBD / Secondary HPT  PTH is high. Continue calcitriol.    HTN, now hypotension  BP seem controlled.   Tolerate asymptomatic HTN up to -160.  Hold home meds.    Thank you for allowing us to participate in the care of your patient!   We will follow the patient and provide recommendations as needed.    Jorge Webb MD    [facility]  [address] [place] [address]  [place] [address]    [phone] - tel  [phone] - fax    3/1/2020 10:50 AM

## 2020-03-02 LAB
ALBUMIN SERPL BCP-MCNC: 2.5 G/DL (ref 3.5–5.2)
ALP SERPL-CCNC: 303 U/L (ref 55–135)
ALT SERPL W/O P-5'-P-CCNC: 22 U/L (ref 10–44)
ANION GAP SERPL CALC-SCNC: 4 MMOL/L (ref 8–16)
ANION GAP SERPL CALC-SCNC: 4 MMOL/L (ref 8–16)
AST SERPL-CCNC: 31 U/L (ref 10–40)
BASOPHILS # BLD AUTO: 0.02 K/UL (ref 0–0.2)
BASOPHILS NFR BLD: 0.5 % (ref 0–1.9)
BILIRUB SERPL-MCNC: 1.5 MG/DL (ref 0.1–1)
BUN SERPL-MCNC: 39 MG/DL (ref 8–23)
BUN SERPL-MCNC: 39 MG/DL (ref 8–23)
CALCIUM SERPL-MCNC: 8.1 MG/DL (ref 8.7–10.5)
CALCIUM SERPL-MCNC: 8.1 MG/DL (ref 8.7–10.5)
CHLORIDE SERPL-SCNC: 106 MMOL/L (ref 95–110)
CHLORIDE SERPL-SCNC: 106 MMOL/L (ref 95–110)
CO2 SERPL-SCNC: 29 MMOL/L (ref 23–29)
CO2 SERPL-SCNC: 29 MMOL/L (ref 23–29)
CREAT SERPL-MCNC: 3.1 MG/DL (ref 0.5–1.4)
CREAT SERPL-MCNC: 3.1 MG/DL (ref 0.5–1.4)
DIFFERENTIAL METHOD: ABNORMAL
EOSINOPHIL # BLD AUTO: 0.3 K/UL (ref 0–0.5)
EOSINOPHIL NFR BLD: 8.2 % (ref 0–8)
ERYTHROCYTE [DISTWIDTH] IN BLOOD BY AUTOMATED COUNT: 16.7 % (ref 11.5–14.5)
EST. GFR  (AFRICAN AMERICAN): 16.9 ML/MIN/1.73 M^2
EST. GFR  (AFRICAN AMERICAN): 16.9 ML/MIN/1.73 M^2
EST. GFR  (NON AFRICAN AMERICAN): 14.7 ML/MIN/1.73 M^2
EST. GFR  (NON AFRICAN AMERICAN): 14.7 ML/MIN/1.73 M^2
GLUCOSE SERPL-MCNC: 97 MG/DL (ref 70–110)
GLUCOSE SERPL-MCNC: 97 MG/DL (ref 70–110)
HCT VFR BLD AUTO: 26.9 % (ref 37–48.5)
HGB BLD-MCNC: 8.4 G/DL (ref 12–16)
IMM GRANULOCYTES # BLD AUTO: 0.02 K/UL (ref 0–0.04)
IMM GRANULOCYTES NFR BLD AUTO: 0.5 % (ref 0–0.5)
LYMPHOCYTES # BLD AUTO: 0.7 K/UL (ref 1–4.8)
LYMPHOCYTES NFR BLD: 17.4 % (ref 18–48)
MAGNESIUM SERPL-MCNC: 1.8 MG/DL (ref 1.6–2.6)
MCH RBC QN AUTO: 31.5 PG (ref 27–31)
MCHC RBC AUTO-ENTMCNC: 31.2 G/DL (ref 32–36)
MCV RBC AUTO: 101 FL (ref 82–98)
MONOCYTES # BLD AUTO: 0.3 K/UL (ref 0.3–1)
MONOCYTES NFR BLD: 8.9 % (ref 4–15)
NEUTROPHILS # BLD AUTO: 2.5 K/UL (ref 1.8–7.7)
NEUTROPHILS NFR BLD: 64.5 % (ref 38–73)
NRBC BLD-RTO: 0 /100 WBC
PHOSPHATE SERPL-MCNC: 2.6 MG/DL (ref 2.7–4.5)
PLATELET # BLD AUTO: 124 K/UL (ref 150–350)
PMV BLD AUTO: 12.1 FL (ref 9.2–12.9)
POTASSIUM SERPL-SCNC: 4.5 MMOL/L (ref 3.5–5.1)
POTASSIUM SERPL-SCNC: 4.5 MMOL/L (ref 3.5–5.1)
PREALB SERPL-MCNC: 15 MG/DL (ref 20–43)
PROT SERPL-MCNC: 4.5 G/DL (ref 6–8.4)
RBC # BLD AUTO: 2.67 M/UL (ref 4–5.4)
SODIUM SERPL-SCNC: 139 MMOL/L (ref 136–145)
SODIUM SERPL-SCNC: 139 MMOL/L (ref 136–145)
TRIGL SERPL-MCNC: 73 MG/DL (ref 30–150)
VANCOMYCIN SERPL-MCNC: 13.6 UG/ML
WBC # BLD AUTO: 3.8 K/UL (ref 3.9–12.7)

## 2020-03-02 PROCEDURE — C9113 INJ PANTOPRAZOLE SODIUM, VIA: HCPCS | Performed by: SURGERY

## 2020-03-02 PROCEDURE — 63600175 PHARM REV CODE 636 W HCPCS: Performed by: INTERNAL MEDICINE

## 2020-03-02 PROCEDURE — 25000003 PHARM REV CODE 250: Performed by: SURGERY

## 2020-03-02 PROCEDURE — 80053 COMPREHEN METABOLIC PANEL: CPT

## 2020-03-02 PROCEDURE — 63600175 PHARM REV CODE 636 W HCPCS: Performed by: SURGERY

## 2020-03-02 PROCEDURE — 84134 ASSAY OF PREALBUMIN: CPT

## 2020-03-02 PROCEDURE — 85025 COMPLETE CBC W/AUTO DIFF WBC: CPT

## 2020-03-02 PROCEDURE — A4217 STERILE WATER/SALINE, 500 ML: HCPCS | Performed by: INTERNAL MEDICINE

## 2020-03-02 PROCEDURE — 83735 ASSAY OF MAGNESIUM: CPT

## 2020-03-02 PROCEDURE — 84100 ASSAY OF PHOSPHORUS: CPT

## 2020-03-02 PROCEDURE — 86580 TB INTRADERMAL TEST: CPT | Performed by: INTERNAL MEDICINE

## 2020-03-02 PROCEDURE — 84478 ASSAY OF TRIGLYCERIDES: CPT

## 2020-03-02 PROCEDURE — 21400001 HC TELEMETRY ROOM

## 2020-03-02 PROCEDURE — B4185 PARENTERAL SOL 10 GM LIPIDS: HCPCS | Performed by: INTERNAL MEDICINE

## 2020-03-02 PROCEDURE — 30200315 PPD INTRADERMAL TEST REV CODE 302: Performed by: INTERNAL MEDICINE

## 2020-03-02 PROCEDURE — 80202 ASSAY OF VANCOMYCIN: CPT

## 2020-03-02 PROCEDURE — 25000003 PHARM REV CODE 250: Performed by: INTERNAL MEDICINE

## 2020-03-02 RX ADMIN — PANTOPRAZOLE SODIUM 40 MG: 40 INJECTION, POWDER, LYOPHILIZED, FOR SOLUTION INTRAVENOUS at 08:03

## 2020-03-02 RX ADMIN — PSYLLIUM HUSK 2 PACKET: 3.4 POWDER ORAL at 09:03

## 2020-03-02 RX ADMIN — HYDROCODONE BITARTRATE AND ACETAMINOPHEN 1 TABLET: 5; 325 TABLET ORAL at 02:03

## 2020-03-02 RX ADMIN — DRONABINOL 2.5 MG: 2.5 CAPSULE ORAL at 05:03

## 2020-03-02 RX ADMIN — DIPHENHYDRAMINE HYDROCHLORIDE 6.25 MG: 50 INJECTION, SOLUTION INTRAMUSCULAR; INTRAVENOUS at 12:03

## 2020-03-02 RX ADMIN — CHOLESTYRAMINE 4 G: 4 POWDER, FOR SUSPENSION ORAL at 09:03

## 2020-03-02 RX ADMIN — POTASSIUM CHLORIDE 40 MEQ: 1.5 SOLUTION ORAL at 08:03

## 2020-03-02 RX ADMIN — LOPERAMIDE HYDROCHLORIDE 4 MG: 2 CAPSULE ORAL at 08:03

## 2020-03-02 RX ADMIN — MAGNESIUM OXIDE 400 MG: 400 TABLET ORAL at 08:03

## 2020-03-02 RX ADMIN — VANCOMYCIN HYDROCHLORIDE 750 MG: 1 INJECTION, POWDER, LYOPHILIZED, FOR SOLUTION INTRAVENOUS at 10:03

## 2020-03-02 RX ADMIN — DRONABINOL 2.5 MG: 2.5 CAPSULE ORAL at 02:03

## 2020-03-02 RX ADMIN — SODIUM CHLORIDE, POTASSIUM CHLORIDE, SODIUM LACTATE AND CALCIUM CHLORIDE: 600; 310; 30; 20 INJECTION, SOLUTION INTRAVENOUS at 09:03

## 2020-03-02 RX ADMIN — LOPERAMIDE HYDROCHLORIDE 4 MG: 2 CAPSULE ORAL at 12:03

## 2020-03-02 RX ADMIN — SODIUM CHLORIDE, POTASSIUM CHLORIDE, SODIUM LACTATE AND CALCIUM CHLORIDE: 600; 310; 30; 20 INJECTION, SOLUTION INTRAVENOUS at 05:03

## 2020-03-02 RX ADMIN — PSYLLIUM HUSK 2 PACKET: 3.4 POWDER ORAL at 02:03

## 2020-03-02 RX ADMIN — LOPERAMIDE HYDROCHLORIDE 4 MG: 2 CAPSULE ORAL at 05:03

## 2020-03-02 RX ADMIN — MORPHINE SULFATE 2 MG: 2 INJECTION, SOLUTION INTRAMUSCULAR; INTRAVENOUS at 10:03

## 2020-03-02 RX ADMIN — LOPERAMIDE HYDROCHLORIDE 4 MG: 2 CAPSULE ORAL at 09:03

## 2020-03-02 RX ADMIN — SODIUM CHLORIDE, POTASSIUM CHLORIDE, SODIUM LACTATE AND CALCIUM CHLORIDE: 600; 310; 30; 20 INJECTION, SOLUTION INTRAVENOUS at 12:03

## 2020-03-02 RX ADMIN — LEVOTHYROXINE SODIUM 88 MCG: 88 TABLET ORAL at 05:03

## 2020-03-02 RX ADMIN — MAGNESIUM OXIDE 400 MG: 400 TABLET ORAL at 09:03

## 2020-03-02 RX ADMIN — MEROPENEM 500 MG: 500 INJECTION, POWDER, FOR SOLUTION INTRAVENOUS at 08:03

## 2020-03-02 RX ADMIN — DIPHENHYDRAMINE HYDROCHLORIDE 6.25 MG: 50 INJECTION, SOLUTION INTRAMUSCULAR; INTRAVENOUS at 05:03

## 2020-03-02 RX ADMIN — SMOFLIPID: 6; 6; 5; 3 INJECTION, EMULSION INTRAVENOUS at 04:03

## 2020-03-02 RX ADMIN — TUBERCULIN PURIFIED PROTEIN DERIVATIVE 5 UNITS: 5 INJECTION, SOLUTION INTRADERMAL at 04:03

## 2020-03-02 RX ADMIN — SERTRALINE HYDROCHLORIDE 50 MG: 50 TABLET ORAL at 08:03

## 2020-03-02 RX ADMIN — CHOLESTYRAMINE 4 G: 4 POWDER, FOR SUSPENSION ORAL at 08:03

## 2020-03-02 RX ADMIN — PANTOPRAZOLE SODIUM 40 MG: 40 INJECTION, POWDER, LYOPHILIZED, FOR SOLUTION INTRAVENOUS at 09:03

## 2020-03-02 RX ADMIN — POTASSIUM CHLORIDE 40 MEQ: 1.5 SOLUTION ORAL at 09:03

## 2020-03-02 NOTE — PT/OT/SLP PROGRESS
Physical Therapy      Patient Name:  Shara Hutchins   MRN:  [id number]    Patient not seen today secondary to Other (Comment). Pt with femoral line limiting mobilization and PT treatment. Will follow-up 03/03/20.    Namita Rivas, PT

## 2020-03-02 NOTE — PLAN OF CARE
Vital signs, cardiac and lab monitoring. IV hydration and IV antibiotics.   Increase activity as tolerated. Bed alarm on. Watch for falls. Watch for sign and symptoms of bleeding. NPO. Breathing treatments and adjust oxygen as needed. Strict I & O. Daily weights.

## 2020-03-02 NOTE — CONSULTS
Nephrology Consult Note        Patient Name: Shara Hutchins  MRN: [id number]    Patient Class: IP- Inpatient   Admission Date: 2/23/2020  Length of Stay: 6 days  Date of Service: 3/2/2020    Attending Physician: Aleksander Phan MD  Primary Care Provider: April Horton MD    Reason for Consult: darci/ckd3/hypovolemia/hypomagnesemia/shpt/anemia/htn    SUBJECTIVE:     HPI: 69F with HTN, DM2, hypothyroidism, secondary hyperparathyroidism, anemia, vulvar Ca (in remission), CKD3 presentswith abdominal pain, lightheadedness, vaginal bleeding. Had an ileostomy on 2/13, d/c on 2/17 (Dr. Meza) for chronic, debilitating diarrhea ? Pneumatosis intestinalis. Since then, she lost 20 lbs, has intermittent nausea and vomiting that is non-bloody and non-bilious, has high ostomy output - seeing whole pills at times. Pain to her abdominal incision site is worsening, had some lightheadedness when standing. Renal consulted for DARCI.    2/25 VSS, no new complains.  2/26 VSS, sCr worse today. UO+OO = > 5L today!!! Will have to step up IVF significantly to match loss + some....  2/27 VSS, remains to have high output from ostomy, agree with liquid diet and TPN as d/w Dr. Kearns, no PICC line but a tunneled [place], she is close to dialysis, has AVF in one arm...  2/28 VSS, ostomy output is down and UO is up, agree with liquid diet and TPN as d/w Dr. Kearns, no PICC line but a tunneled [place], she is close to dialysis, has AVF in one arm...  2/29 VSS, ostomy output is down and UO is up, agree with liquid diet and TPN as d/w Dr. Kearns. No PICC line but a tunneled [place], she is close to dialysis, has AVF in one arm...  3/1 VSS, no new complains. Femoral cath is suboptimal location for access that has to stay there for 4-6 weeks. Patient will have to be bed ridden and might require LTAC.  3/2 No nausea, chest pain, sob, fever, urinary or bowel complaint, new neurologic symptoms, new joint pain,      Past Medical History:   Diagnosis Date     Chronic kidney disease, stage III (moderate) 2018    Diabetes mellitus, type 2     Fatigue     Hypertension     Iron deficiency anemia due to chronic blood loss 2018    Vulvar cancer 2019     Past Surgical History:   Procedure Laterality Date    ABDOMINAL SURGERY      BACK SURGERY      CERVICAL FUSION      x 4    cervix repair      CHOLECYSTECTOMY      DIAGNOSTIC LAPAROSCOPY N/A 2020    Procedure: LAPAROSCOPY, DIAGNOSTIC;  Surgeon: Robbi Lovell III, MD;  Location: [facility] OR;  Service: General;  Laterality: N/A;    HYSTERECTOMY  1980    ILEOSTOMY N/A 2020    Procedure: CREATION, ILEOSTOMY Loop;  Surgeon: Robbi Lovell III, MD;  Location: [facility] OR;  Service: General;  Laterality: N/A;    LUMBAR LAMINECTOMY      LYSIS OF ADHESIONS N/A 2020    Procedure: LYSIS, ADHESIONS;  Surgeon: Robbi Lovell III, MD;  Location: [facility] OR;  Service: General;  Laterality: N/A;    NECK SURGERY      PHLEBOGRAPHY  2020    Procedure: VENOGRAM;  Surgeon: Robbi Lovell III, MD;  Location: [facility] OR;  Service: General;;    REMOVAL OF VASCULAR ACCESS PORT Right 2020    Procedure: REMOVAL, VASCULAR ACCESS PORT;  Surgeon: Robbi Lovell III, MD;  Location: [facility] OR;  Service: General;  Laterality: Right;    SHOULDER SURGERY      vulva cancer       Family History   Problem Relation Age of Onset    Heart disease Mother     Hypertension Mother     Cancer Mother         lung    Heart disease Father     Heart disease Sister         Open heart surgery    No Known Problems Daughter      Social History     Tobacco Use    Smoking status: Former Smoker     Packs/day: 1.00     Years: 33.00     Pack years: 33.00     Types: Cigarettes     Last attempt to quit: 2000     Years since quittin.6    Smokeless tobacco: Never Used   Substance Use Topics    Alcohol use: No    Drug use: No       Review of patient's allergies indicates:   Allergen Reactions     Ciprofloxacin Other (See Comments)     Elevated liver enzymes      Pcn [penicillins] Anaphylaxis       Outpatient meds:  No current facility-administered medications on file prior to encounter.      Current Outpatient Medications on File Prior to Encounter   Medication Sig Dispense Refill    calcitRIOL (ROCALTROL) 0.5 MCG Cap Take 1 capsule by mouth once daily.  3    dicyclomine (BENTYL) 10 MG capsule TAKE 1 CAPSULE THREE TIMES A DAY (Patient taking differently: Take 10 mg by mouth 3 (three) times daily. ) 270 capsule 4    diphenhydrAMINE (BENADRYL) 25 mg capsule Take 25 mg by mouth nightly as needed for Itching.      dronabinol (MARINOL) 2.5 MG capsule Take 1 capsule (2.5 mg total) by mouth 2 (two) times daily before meals. 60 capsule 0    HYDROcodone-acetaminophen (NORCO)  mg per tablet Take 1 tablet by mouth every 6 (six) hours as needed for Pain. 30 tablet 0    levothyroxine (SYNTHROID) 88 MCG tablet TAKE 1 TABLET DAILY (Patient taking differently: Take 88 mcg by mouth every morning. ) 90 tablet 4    magnesium oxide (MAG-OX) 400 mg (241.3 mg magnesium) tablet Take 400 mg by mouth 2 (two) times daily.      potassium chloride (KLOR-CON) 10 MEQ TbSR Take 20 mEq by mouth 2 (two) times daily.       promethazine (PHENERGAN) 25 MG tablet Take 1 tablet (25 mg total) by mouth daily as needed for Nausea. 90 tablet 0    sertraline (ZOLOFT) 50 MG tablet Take 50 mg by mouth once daily.       sodium bicarbonate 650 MG tablet Take 650 mg by mouth 3 (three) times daily.   2       Scheduled meds:   cholestyramine-aspartame  1 packet Oral BID    diphenhydrAMINE  6.25 mg Intravenous Q6H    dronabinol  2.5 mg Oral BID AC    levothyroxine  88 mcg Oral QAM    loperamide  4 mg Oral QID    magnesium oxide  400 mg Oral BID    meropenem (MERREM) IVPB  500 mg Intravenous Daily    pantoprazole  40 mg Intravenous BID    potassium chloride 10%  40 mEq Oral BID    psyllium husk (with sugar)  2 packet Oral TID     sertraline  50 mg Oral Daily       Infusions:   lactated ringers 200 mL/hr at 03/02/20 0533    octreotide (SANDOSTATIN) infusion 25 mcg/hr (02/29/20 1736)    TPN ADULT CENTRAL LINE CUSTOM (3 in 1) 50 mL/hr at 03/01/20 1717       PRN meds:  sodium chloride, acetaminophen, calcium chloride IVPB, calcium chloride IVPB, calcium chloride IVPB, codeine, dextrose 50%, dextrose 50%, glucagon (human recombinant), glucose, glucose, HYDROcodone-acetaminophen, magnesium oxide, magnesium sulfate IVPB, magnesium sulfate IVPB, magnesium sulfate IVPB, magnesium sulfate IVPB, melatonin, morphine, ondansetron, potassium chloride in water, potassium chloride in water, potassium chloride in water, potassium chloride in water, potassium chloride, potassium chloride, potassium chloride, potassium chloride, sodium chloride 0.9%, sodium chloride 0.9%, sodium phosphate IVPB, sodium phosphate IVPB, sodium phosphate IVPB, sodium phosphate IVPB, sodium phosphate IVPB, Pharmacy to dose Vancomycin consult **AND** vancomycin - pharmacy to dose    Review of Systems:  ROS    OBJECTIVE:     Vital Signs and IO (Last 24H):  Temp:  [97.9 °F (36.6 °C)-98.3 °F (36.8 °C)]   Pulse:  [58-66]   Resp:  [15-20]   BP: (115-150)/(61-67)   SpO2:  [97 %-98 %]   I/O last 3 completed shifts:  In: 2900 [P.O.:800; I.V.:2000]  Out: 6250 [Urine:4150; Other:1900; Stool:200]    Wt Readings from Last 5 Encounters:   03/02/20 52.6 kg (116 lb)   02/19/20 58.7 kg (129 lb 4.8 oz)   02/19/20 58.8 kg (129 lb 9.6 oz)   02/14/20 56.7 kg (125 lb)   02/05/20 56.9 kg (125 lb 6.4 oz)         Physical Exam:  Physical Exam   Constitutional: She is oriented to person, place, and time. She appears well-developed and well-nourished.   HENT:   Head: Normocephalic and atraumatic.   Mouth/Throat: Oropharynx is clear and moist.   Eyes: Pupils are equal, round, and reactive to light. EOM are normal. No scleral icterus.   Neck: Neck supple.   Cardiovascular: Normal rate and regular rhythm.    Pulmonary/Chest: Effort normal. No stridor. No respiratory distress.   Abdominal: Soft. She exhibits no distension.   Musculoskeletal: Normal range of motion. She exhibits no edema or deformity.   Neurological: She is alert and oriented to person, place, and time. No cranial nerve deficit.   Skin: Skin is warm and dry. No rash noted. She is not diaphoretic. No erythema.   Psychiatric: She has a normal mood and affect. Her behavior is normal.       Body mass index is 18.17 kg/m².    Laboratory:  Recent Labs   Lab 02/29/20 0440 03/01/20 0522 03/02/20  0514   * 136 139  139   K 3.8 3.7 4.5  4.5   CL 95 98 106  106   CO2 32* 33* 29  29   BUN 41* 45* 39*  39*   CREATININE 4.3* 4.0* 3.1*  3.1*   ESTGFRAFRICA 11.4* 12.4* 16.9*  16.9*   EGFRNONAA 9.9* 10.8* 14.7*  14.7*    100 97  97       Recent Labs   Lab 02/27/20  0417  02/29/20 0440 03/01/20 0522 03/02/20  0514   CALCIUM 8.1*   < > 7.8* 8.2* 8.1*  8.1*   ALBUMIN  --   --   --   --  2.5*   PHOS 4.7*  --  3.9  --  2.6*   MG 1.8   < > 2.1 1.7 1.8    < > = values in this interval not displayed.       Recent Labs   Lab 09/30/19  0820 11/15/19  0748   PTH, Intact 176.4 H 238.4 H       No results for input(s): POCTGLUCOSE in the last 168 hours.    Recent Labs   Lab 07/11/19  0806 11/15/19  0748 01/22/20  0848   Hemoglobin A1C 5.1 Invalid A SEE COMMENT       Recent Labs   Lab 02/29/20 0440 03/01/20 0522 03/02/20  0514   WBC 7.41 4.63 3.80*   HGB 7.7* 9.1* 8.4*   HCT 25.3* 29.0* 26.9*    116* 124*   * 100* 101*   MCHC 30.4* 31.4* 31.2*   MONO 5.5  0.4 6.9  0.3 8.9  0.3       Recent Labs   Lab 03/02/20  0514   BILITOT 1.5*   PROT 4.5*   ALBUMIN 2.5*   ALKPHOS 303*   ALT 22   AST 31       Recent Labs   Lab 02/10/20  2119 02/23/20  1715   Color, UA Yellow Yellow   Appearance, UA Hazy A Clear   pH, UA 6.0 >8.0 A   Specific Arley, UA 1.010 1.010   Protein, UA Trace A 1+ A   Glucose, UA Negative Negative   Ketones, UA Negative Negative    Urobilinogen, UA Negative Negative   Bilirubin (UA) Negative Negative   Occult Blood UA 2+ A Negative   Nitrite, UA Negative Negative   RBC, UA 5 H 4   WBC, UA 38 H 19 H   Bacteria Negative Negative   Hyaline Casts, UA 29 A 19 A             Microbiology Results (last 7 days)     Procedure Component Value Units Date/Time    Blood culture #1 **CANNOT BE ORDERED STAT** [[id number]] Collected:  02/23/20 1530    Order Status:  Completed Specimen:  Blood from Peripheral, Upper Arm, Right Updated:  02/28/20 1632     Blood Culture, Routine No growth after 5 days.    Blood culture #2 **CANNOT BE ORDERED STAT** [[id number]] Collected:  02/23/20 1545    Order Status:  Completed Specimen:  Blood from Peripheral, Upper Arm, Right Updated:  02/28/20 1632     Blood Culture, Routine No growth after 5 days.    Urine culture [[id number]] Collected:  02/23/20 1715    Order Status:  Completed Specimen:  Urine Updated:  02/26/20 0947     Urine Culture, Routine No growth    Narrative:       Preferred Collection Type->Urine, Clean Catch  Specimen Source->Urine          ASSESSMENT/PLAN:     Active Hospital Problems    Diagnosis  POA    *Severe dehydration due to chronic diarrhea/high output ileostomy [E86.0]  Yes    DNR (do not resuscitate) [Z66]  Yes    Electrolyte imbalance [E87.8]  Yes    Severe malnutrition [E43]  Yes    DARCI (acute kidney injury) [N17.9]  Yes      Resolved Hospital Problems   No resolved problems to display.     DARCI due to volume depletion from high ostomy output  CKD stage 3-4, has AVF in place  Hypomagnesemia  Hypokalemia  Acidosis  No NSAIDs, ACEI/ARB, IV contrast or other nephrotoxins.  Keep MAP > 60, SBP > 100.  Dose meds for GFR < 30 ml/min.  IVF PRN, replete electrolytes as needed.  Clear liquid diet or NPO based on output.   Keep KCL to 40 BID, liquid and oral Mg as tolerated.   Plan for TPN for several more weeks before closing the ostomy.   Femoral cath is suboptimal location for access if it has to stay  there for 4-6 weeks. Patient will have to be bed ridden and might require LTAC.    Anemia of CKD  Hgb and HCT are acceptable. Monitor.  Will provide ADAM and/or IV iron PRN.    MBD / Secondary HPT  PTH is high. Continue calcitriol.    HTN, now hypotension  BP seem controlled.   Tolerate asymptomatic HTN up to -160.  Hold home meds.    Thank you for allowing us to participate in the care of your patient!   We will follow the patient and provide recommendations as needed.    Raúl Dietz MD    [facility]  [address] [place] [address]  [place] [address]    [phone] - tel  [phone] - fax    3/2/2020 10:50 AM

## 2020-03-02 NOTE — PROGRESS NOTES
VANCOMYCIN PHARMACOKINETIC NOTE:  Vancomycin Day # 9    Objective:    69 y.o., female, Actual Body Weight = 52.6 kg (116 lb)    Diagnosis/Indication for Vancomycin:  Intra-abdominal sepsis, Enteritis  Desired Vancomycin Peak:  30-35 mcg/ml; Desired Trough: 10-15 mcg/ml    Current Vancomycin Regimen:  Intermittent dosing based on lab levels    Date Time Dose # Dosage Serum Vancomycin Level Comments   2/23/20 22:43 1 750 mg       2/24/20 22:06    15.4 mcg/ml Random    2/25/20 03:30 2 750 mg       2/26/20 04:20    19.4 mcg/ml Random    2/27/20 04:16    17.0 mcg/ml Random    2/28/20 04:19    14.5 mcg/ml Random    2/29/20 11:53 3 750 mg       3/1/20 05:22    15.9 mcg/ml Random    3/2/20 05:14    13.6 mcg/ml Random        Receiving other antibiotics:    Antibiotics (From admission, onward)      Start     Stop Route Frequency Ordered    02/26/20 0900  meropenem 500 mg in sodium chloride 0.9 % 100 mL IVPB (ready to mix system)      -- IV Daily 02/25/20 1624    02/24/20 1245  mupirocin 2 % ointment  (MRSA Decolonization Orders [facility])      02/29 0859 Nasl 2 times daily 02/24/20 1133             The patient has the following labs:     3/2/2020 Estimated Creatinine Clearance: 14.2 mL/min (A) (based on SCr of 3.1 mg/dL (H)). Lab Results   Component Value Date    BUN 39 (H) 03/02/2020    BUN 39 (H) 03/02/2020       Lab Results   Component Value Date    WBC 3.80 (L) 03/02/2020          Random vancomycin: 13.6 mcg/mL collected 41.4 hours after Dose # 3    Microbiology Results (last 7 days)       Procedure Component Value Units Date/Time    Blood culture #1 **CANNOT BE ORDERED STAT** [[id number]] Collected:  02/23/20 1530    Order Status:  Completed Specimen:  Blood from Peripheral, Upper Arm, Right Updated:  02/28/20 1632     Blood Culture, Routine No growth after 5 days.    Blood culture #2 **CANNOT BE ORDERED STAT** [[id number]] Collected:  02/23/20 1545    Order Status:  Completed Specimen:  Blood from Peripheral, Upper Arm, Right  Updated:  02/28/20 1632     Blood Culture, Routine No growth after 5 days.    Urine culture [[id number]] Collected:  02/23/20 1715    Order Status:  Completed Specimen:  Urine Updated:  02/26/20 0947     Urine Culture, Routine No growth    Narrative:       Preferred Collection Type->Urine, Clean Catch  Specimen Source->Urine             Assessment:  Vancomycin dose =  14.3 mg/kg  Renal function is: poor.  Vancomycin trough is within goal range.    Plan:  No change to current vancomycin regimen  Will schedule next dose for 3/2 at 12:00.  Will obtain random vancomycin level 3/4 with AM labs.    Pharmacy will continue to manage vancomycin therapy and adjust regimen as necessary.    Thank you for allowing us to participate in this patient's care.     Guerrero Hall 3/2/2020 9:19 AM  Department of Pharmacy  Ext [phone]

## 2020-03-02 NOTE — PT/OT/SLP PROGRESS
Occupational Therapy      Patient Name:  Shara Hutchins   MRN:  [id number]    Patient not seen today secondary to (pt on HOLD due to femoral line present ). Will follow-up at a later date.    Antonietta Ricardo OT  3/2/315231:03 PM

## 2020-03-02 NOTE — PROGRESS NOTES
[facility] Medicine  Progress Note    Patient Name: Shara Hutchins  MRN: [id number]  Patient Class: IP- Inpatient   Admission Date: 2/23/2020  Length of Stay: 6 days  Attending Physician: Aleksander Phan MD  Primary Care Provider: April Horton MD        Subjective:     Principal Problem:Severe dehydration  Shara Hutchins is a 69 y.o. White female who  has a past medical history of Chronic kidney disease, stage III (moderate) (1/7/2018), Diabetes mellitus, type 2, Fatigue, Hypertension, Iron deficiency anemia due to chronic blood loss (1/7/2018), and Vulvar cancer (1/7/2019).. The patient presented to [facility] on 2/23/2020 with a primary complaint of weight loss.       History was obtained from the patient and the family present at the bedside and ER physician Sign-out. Patient was recently discharged (2/17) six days ago, from the [facility] after ileostomy formation for the concern of pneumatosis intestinalis. Since the discharge she is having constant output from the ileostomy bag (around 1 bag every 30 minutes).  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 Family thinks that her problem has just worsened after the surgery to that extent that she is now unable to keep any thing in the stomach. All her pills come out without absorption directly in her ileostomy bag. She has lost over 20 lbs in less than a week time. Today, she was so weak and dizzy that they have to bring her to the emergency room for further evaluation.       In the emergency room, patient CBC was unremarkable. CMP was unremarkable. BNP and troponin was negative. Reviewed EKG and does not show new ischemic changes. No concerning finding on chest x ray.   Interval History:     Patient was admitted with high output ileostomy  She had previous history of bowel  resection.  Recently did ileostomy for  the concern of pneumatosis intestinalis.  She was started on multiple medications including questran , sandostatin and currently better  Denied having chest pain, abdominal pain. Ileostomy bag with formed stool  [place] of Systems  Objective:     Vital Signs (Most Recent):  Temp: 97.9 °F (36.6 °C) (03/02/20 1050)  Pulse: 60 (03/02/20 1050)  Resp: 16 (03/02/20 1050)  BP: 126/65 (03/02/20 1050)  SpO2: 98 % (03/02/20 1050) Vital Signs (24h Range):  Temp:  [97.9 °F (36.6 °C)-98.3 °F (36.8 °C)] 97.9 °F (36.6 °C)  Pulse:  [58-66] 60  Resp:  [15-20] 16  SpO2:  [98 %] 98 %  BP: (115-150)/(61-66) 126/65     Weight: 52.6 kg (116 lb)  Body mass index is 18.17 kg/m².    Intake/Output Summary (Last 24 hours) at 3/2/2020 1450  Last data filed at 3/2/2020 1300  Gross per 24 hour   Intake 1420 ml   Output 2750 ml   Net -1330 ml      Physical Exam:  General- Patient alert and oriented x3 in NAD  HEENT- PERRLA, EOMI, OP clear, MMM  Neck- No JVD, Lymphadenopathy, Thyromegaly  CV- Regular rate and rhythm, No Murmur/jackie/rubs  Resp- Lungs CTA Bilaterally, No increased WOB  GI- Non tender BS normoactive x4 quads  Extrem- No cyanosis, clubbing, edema  Neuro- Strength 5/5 flexors/extensors,Skin-  No masses, rashes or lesions noted on cursory skin exam.    Overview/Hospital Course:     Significant Labs:   CBC:   Recent Labs   Lab 03/01/20 0522 03/02/20 0514   WBC 4.63 3.80*   HGB 9.1* 8.4*   HCT 29.0* 26.9*   * 124*     CMP:   Recent Labs   Lab 03/01/20 0522 03/02/20 0514    139  139   K 3.7 4.5  4.5   CL 98 106  106   CO2 33* 29  29    97  97   BUN 45* 39*  39*   CREATININE 4.0* 3.1*  3.1*   CALCIUM 8.2* 8.1*  8.1*   PROT  --  4.5*   ALBUMIN  --  2.5*   BILITOT  --  1.5*   ALKPHOS  --  303*   AST  --  31   ALT  --  22   ANIONGAP 5* 4*  4*   EGFRNONAA 10.8* 14.7*  14.7*       Significant Imaging: I have reviewed all pertinent imaging results/findings within the past 24 hours.    Assessment/Plan:       Severe dehydration with hypotension  secondary to High output iliostomy;   Also possible Due to chemoradiation therapy induced chronic diarrhea worsened after ileostomy.     TPN from femoral line   , IVF high dose and may need to slow down.     Metamucil ,Loperamide 4mg QID. , codeine tab  protonix IV   Questran,Sandostatin infusion and may stop tomorrow   Follow-up GI, General surgery  Patient most likely going to need skilled nursing facility  Surgeon is planning to reverse Ileostomy  as early as possible and patient needs to be on TPN until then  PPD placed       Malnutrition with  weight loss of 20 lb in 1 week  Start  TPN when line ready.     Lactic Acidosis secondary to dehydration , no sign of infection -resolved.C diff and blood cultures neg  Continue  antibiotics  given the cause could be intra-abdominal sepsis, enteritis    DARCI on CKD V: Improving   Continue IV fluids  Avoid nephrotoxins  Avoid NSAID, Ace inhibitors, arbs  Renal dose all medications      Vulvar cancer with skin excoriation due to XRT   wound care       Hypothyroidism:   continue Levothyroxine.     Worsening chronic Anemia s/p blood transfusion   Pt was on PRN IV iron Rx prior to admission   S/p blood transfusions    Deconditioning:   Hypomagnesemia-resolved   DNR status     Patient has difficult line access because of arteriosclerosis all over the whole body and currently using only femoral line and the getting TPN from there and high risk for infection but there is no other alternative at this moment       ________________________________________________________________________________          Active Diagnoses:    Diagnosis Date Noted POA    PRINCIPAL PROBLEM:  Severe dehydration due to chronic diarrhea/high output ileostomy [E86.0] 02/24/2020 Yes    DNR (do not resuscitate) [Z66] 02/24/2020 Yes    Electrolyte imbalance [E87.8] 02/24/2020 Yes    Severe malnutrition [E43] 02/24/2020 Yes    DARCI (acute kidney injury) [N17.9]  02/23/2020 Yes      Problems Resolved During this Admission:     VTE Risk Mitigation (From admission, onward)         Ordered     IP VTE HIGH RISK PATIENT  Once      02/23/20 2025     Reason for No Pharmacological VTE Prophylaxis  Once     Question:  Reasons:  Answer:  Risk of Bleeding    02/23/20 2025                   Aleksander Phan MD  Department of Hospital Medicine   [facility]

## 2020-03-02 NOTE — PLAN OF CARE
Problem: Malnutrition  Goal: Improved Nutritional Intake  Outcome: Ongoing, Progressing  Intervention: Promote and Optimize Nutrition Support  Flowsheets (Taken 3/2/2020 1330)  Nutrition Support Management: parenteral nutrition composition adjusted     Problem: Parenteral Nutrition  Goal: Effective Intravenous Nutrition Therapy Delivery  Outcome: Ongoing, Progressing  Intervention: Optimize Intravenous Nutrition Delivery  Flowsheets (Taken 3/2/2020 1330)  Nutrition Support Management: parenteral nutrition composition adjusted

## 2020-03-02 NOTE — PROGRESS NOTES
[facility]  Adult Nutrition   Progress Note (Nutrition Support Management)    SUMMARY     Recommendations  Recommendation/Intervention: 1. RD ordered new TPN to be initiated today @ 1700. 2. Recommend diet be resumed as medically able. 3. NST to monitor and manage TPN daily and advance to meet estimated protein and calorie needs accordingly. 4. Blood glucose and electrolytes to be monitored and managed.  Goals: 1. Patient to tolerated TPN. 2. Patient to meet at least 75% of estimated needs via TPN provision. 3. Blood glucose and electrolytes to trend towards normal limits.   Nutrition Goal Status: new  Communication of RD Recs: reviewed with RN(reviewed with physician )    Dietitian Rounds Brief  TPN to continue. New TPN ordered.     PARENTERAL NUTRITION PROGRESS NOTE:    Parenteral Nutrition Day # 2  Diagnosis/Indication for PN: high ostomy output, severe malnutrition, expect TPN for 6 weeks   IV Access: Femoral central line   Diet/Tube Feeding: NPO         Admixture Type: 3-in-1 (15% AA, 70% dextrose, 20% smoflipid)   Infusion Rate and Frequency: 50 ml/hr, continuous    Patient Acuity: Acute Care      PN Composition:   51 grams Amino Acid, 189 grams Dextrose, and 50 grams Lipid.    Today's TPN provides 1347 kcal.  *Dextrose is started at less than full dextrose goal to reduce risk for Refeeding Syndrome. Dextrose content will be advanced as appropriate over the next few days.     Please see order for electrolytes and additives content and adjustments.   *The Nutrition Support Team will continue to monitor electrolytes daily and adjust parenteral nutrition as warranted.    Reason for Assessment  Reason For Assessment: new TPN, RD follow-up  Relevant Medical History: Vulvar cancer, Chronic kidney disease, stage III (moderate), DM2, fatigue, HTN, ileostomy (2/13/20), DARCI, Severe dehydration due to chronic diarrhea/high output ileostomy, electrolyte imbalance     Nutrition Risk Screen  Nutrition Risk  "Screen: tube feeding or parenteral nutrition       Pressure Injury 20 1837 Left Buttocks Stage 2-Wound Image: Images linked       Wound 20 Moisture associated dermatitis medial Perineum #1-Wound Image: Images linked  MST Score: 2  Have you recently lost weight without trying?: Yes: 14-23 lbs  Weight loss score: 2  Have you been eating poorly because of a decreased appetite?: No  Appetite score: 0       Nutrition/Diet History  Patient Reported Diet/Restrictions/Preferences: general  Spiritual, Cultural Beliefs, Yazidi Practices, Values that Affect Care: no  Food Allergies: NKFA  Factors Affecting Nutritional Intake: NPO, altered gastrointestinal function, malabsorption    Anthropometrics  Temp: 97.9 °F (36.6 °C)  Height Method: Stated  Height: 5' 7" (170.2 cm)  Height (inches): 67 in  Weight Method: Bed Scale  Weight: 52.6 kg (116 lb)  Weight (lb): 116 lb  Ideal Body Weight (IBW), Female: 135 lb  % Ideal Body Weight, Female (lb): 85.57 %  BMI (Calculated): 18.2  BMI Grade: 17 - 18.4 protein-energy malnutrition grade I  Weight Loss: unintentional  Usual Body Weight (UBW), k.8 kg  Weight Change Amount: 28 lb  % Usual Body Weight: 80.17  % Weight Change From Usual Weight: -20 %       Weight History:  Wt Readings from Last 10 Encounters:   20 52.6 kg (116 lb)   20 58.7 kg (129 lb 4.8 oz)   20 58.8 kg (129 lb 9.6 oz)   20 56.7 kg (125 lb)   20 56.9 kg (125 lb 6.4 oz)   20 57.2 kg (126 lb)   20 61.4 kg (135 lb 6.4 oz)   19 59 kg (130 lb)   19 61.8 kg (136 lb 4.8 oz)   19 63.8 kg (140 lb 10.5 oz)       Lab/Procedures/Meds: Pertinent Labs Reviewed  Clinical Chemistry:  Recent Labs   Lab 20  0417  20  0440  20  0514      < > 134*   < > 139  139   K 4.9   < > 3.8   < > 4.5  4.5   CL 92*   < > 95   < > 106  106   CO2 35*   < > 32*   < > 29  29   *   < > 107   < > 97  97   BUN 32*   < > 41*   < > 39*  39* "   CREATININE 4.3*   < > 4.3*   < > 3.1*  3.1*   CALCIUM 8.1*   < > 7.8*   < > 8.1*  8.1*   PROT  --   --   --   --  4.5*   ALBUMIN  --   --   --   --  2.5*   BILITOT  --   --   --   --  1.5*   ALKPHOS  --   --   --   --  303*   AST  --   --   --   --  31   ALT  --   --   --   --  22   ANIONGAP 13   < > 7*   < > 4*  4*   ESTGFRAFRICA 11.4*   < > 11.4*   < > 16.9*  16.9*   EGFRNONAA 9.9*   < > 9.9*   < > 14.7*  14.7*   MG 1.8   < > 2.1   < > 1.8   PHOS 4.7*  --  3.9  --  2.6*    < > = values in this interval not displayed.     CBC:   Recent Labs   Lab 03/02/20  0514   WBC 3.80*   RBC 2.67*   HGB 8.4*   HCT 26.9*   *   *   MCH 31.5*   MCHC 31.2*     Lipid Panel:  Recent Labs   Lab 03/02/20  0514   TRIG 73     Thyroid & Parathyroid:  Recent Labs   Lab 02/24/20  1415   [id number] 7.3   [id number] 44*     Medications: Pertinent Medications reviewed  Scheduled Meds:   cholestyramine-aspartame  1 packet Oral BID    diphenhydrAMINE  6.25 mg Intravenous Q6H    dronabinol  2.5 mg Oral BID AC    levothyroxine  88 mcg Oral QAM    loperamide  4 mg Oral QID    magnesium oxide  400 mg Oral BID    meropenem (MERREM) IVPB  500 mg Intravenous Daily    pantoprazole  40 mg Intravenous BID    potassium chloride 10%  40 mEq Oral BID    psyllium husk (with sugar)  2 packet Oral TID    sertraline  50 mg Oral Daily     Continuous Infusions:   lactated ringers 200 mL/hr at 03/02/20 0533    octreotide (SANDOSTATIN) infusion 25 mcg/hr (02/29/20 1736)    TPN ADULT CENTRAL LINE CUSTOM (3 in 1) 50 mL/hr at 03/01/20 1717    TPN ADULT CENTRAL LINE CUSTOM (3 in 1)       PRN Meds:.sodium chloride, acetaminophen, calcium chloride IVPB, calcium chloride IVPB, calcium chloride IVPB, codeine, dextrose 50%, dextrose 50%, glucagon (human recombinant), glucose, glucose, HYDROcodone-acetaminophen, magnesium oxide, magnesium sulfate IVPB, magnesium sulfate IVPB, magnesium sulfate IVPB, magnesium sulfate IVPB, melatonin, morphine,  ondansetron, potassium chloride in water, potassium chloride in water, potassium chloride in water, potassium chloride in water, potassium chloride, potassium chloride, potassium chloride, potassium chloride, sodium chloride 0.9%, sodium chloride 0.9%, sodium phosphate IVPB, sodium phosphate IVPB, sodium phosphate IVPB, sodium phosphate IVPB, sodium phosphate IVPB, Pharmacy to dose Vancomycin consult **AND** vancomycin - pharmacy to dose    Estimated/Assessed Needs    Weight Used For Calorie Calculations: 52.4 kg (115 lb 8.3 oz)  Energy Calorie Requirements (kcal): 5992-8036 kcals/day (35-40 kcals/kg)  Energy Need Method: Kcal/kg  Protein Requirements:  g/day (1.5-2.0 g/kg)  Weight Used For Protein Calculations: 52.4 kg (115 lb 8.3 oz)  Fluid Requirements (mL): 1572 (30mL/kg) or to maintain hydration   Estimated Fluid Requirement Method: other (see comments)  RDA Method (mL): 1834       Nutrition Prescription Ordered  Current Diet Order: NPO   Current Nutrition Support Formula Ordered: Other (Comment)(custom TPN )    Evaluation of Received Nutrient/Fluid Intake  Energy Calories Required: not meeting needs  Protein Required: meeting needs  Fluid Required: meeting needs  Tolerance: tolerating     Intake/Output Summary (Last 24 hours) at 3/2/2020 1329  Last data filed at 3/2/2020 1300  Gross per 24 hour   Intake 1420 ml   Output 3850 ml   Net -2430 ml      Nutrition Risk  Level of Risk/Frequency of Follow-up: high     Malnutrition Assessment  Malnutrition Type: chronic illness    Monitor and Evaluation  Food and Nutrient Intake: parenteral nutrition intake, energy intake  Food and Nutrient Adminstration: enteral and parenteral nutrition administration  Physical Activity and Function: nutrition-related ADLs and IADLs, factors affecting access to physical activity  Anthropometric Measurements: weight change, weight, body mass index  Biochemical Data, Medical Tests and Procedures: electrolyte and renal panel,  glucose/endocrine profile, lipid profile, gastrointestinal profile, inflammatory profile  Nutrition-Focused Physical Findings: overall appearance, extremities, muscles and bones, head and eyes, skin     Nutrition Follow-Up  RD Follow-up?: Yes     Sandra Lovell RD 03/02/2020 1:32 PM

## 2020-03-03 LAB
ALBUMIN SERPL BCP-MCNC: 2.6 G/DL (ref 3.5–5.2)
ALP SERPL-CCNC: 241 U/L (ref 55–135)
ALT SERPL W/O P-5'-P-CCNC: 16 U/L (ref 10–44)
ANION GAP SERPL CALC-SCNC: 3 MMOL/L (ref 8–16)
ANION GAP SERPL CALC-SCNC: 3 MMOL/L (ref 8–16)
AST SERPL-CCNC: 23 U/L (ref 10–40)
BASOPHILS # BLD AUTO: 0.01 K/UL (ref 0–0.2)
BASOPHILS NFR BLD: 0.2 % (ref 0–1.9)
BILIRUB SERPL-MCNC: 1.5 MG/DL (ref 0.1–1)
BLD PROD TYP BPU: NORMAL
BLD PROD TYP BPU: NORMAL
BLOOD UNIT EXPIRATION DATE: NORMAL
BLOOD UNIT EXPIRATION DATE: NORMAL
BLOOD UNIT TYPE CODE: 6200
BLOOD UNIT TYPE CODE: 6200
BLOOD UNIT TYPE: NORMAL
BLOOD UNIT TYPE: NORMAL
BUN SERPL-MCNC: 35 MG/DL (ref 8–23)
BUN SERPL-MCNC: 35 MG/DL (ref 8–23)
CALCIUM SERPL-MCNC: 8.3 MG/DL (ref 8.7–10.5)
CALCIUM SERPL-MCNC: 8.3 MG/DL (ref 8.7–10.5)
CHLORIDE SERPL-SCNC: 112 MMOL/L (ref 95–110)
CHLORIDE SERPL-SCNC: 112 MMOL/L (ref 95–110)
CO2 SERPL-SCNC: 26 MMOL/L (ref 23–29)
CO2 SERPL-SCNC: 26 MMOL/L (ref 23–29)
CODING SYSTEM: NORMAL
CODING SYSTEM: NORMAL
CREAT SERPL-MCNC: 2.7 MG/DL (ref 0.5–1.4)
CREAT SERPL-MCNC: 2.7 MG/DL (ref 0.5–1.4)
DIFFERENTIAL METHOD: ABNORMAL
DISPENSE STATUS: NORMAL
DISPENSE STATUS: NORMAL
EOSINOPHIL # BLD AUTO: 0.3 K/UL (ref 0–0.5)
EOSINOPHIL NFR BLD: 7.4 % (ref 0–8)
ERYTHROCYTE [DISTWIDTH] IN BLOOD BY AUTOMATED COUNT: 16.5 % (ref 11.5–14.5)
EST. GFR  (AFRICAN AMERICAN): 20 ML/MIN/1.73 M^2
EST. GFR  (AFRICAN AMERICAN): 20 ML/MIN/1.73 M^2
EST. GFR  (NON AFRICAN AMERICAN): 17.3 ML/MIN/1.73 M^2
EST. GFR  (NON AFRICAN AMERICAN): 17.3 ML/MIN/1.73 M^2
GLUCOSE SERPL-MCNC: 107 MG/DL (ref 70–110)
GLUCOSE SERPL-MCNC: 107 MG/DL (ref 70–110)
HCT VFR BLD AUTO: 27.6 % (ref 37–48.5)
HGB BLD-MCNC: 8.5 G/DL (ref 12–16)
IMM GRANULOCYTES # BLD AUTO: 0.02 K/UL (ref 0–0.04)
IMM GRANULOCYTES NFR BLD AUTO: 0.5 % (ref 0–0.5)
LYMPHOCYTES # BLD AUTO: 1 K/UL (ref 1–4.8)
LYMPHOCYTES NFR BLD: 22.1 % (ref 18–48)
MAGNESIUM SERPL-MCNC: 2.2 MG/DL (ref 1.6–2.6)
MCH RBC QN AUTO: 31.7 PG (ref 27–31)
MCHC RBC AUTO-ENTMCNC: 30.8 G/DL (ref 32–36)
MCV RBC AUTO: 103 FL (ref 82–98)
MONOCYTES # BLD AUTO: 0.4 K/UL (ref 0.3–1)
MONOCYTES NFR BLD: 9.1 % (ref 4–15)
NEUTROPHILS # BLD AUTO: 2.6 K/UL (ref 1.8–7.7)
NEUTROPHILS NFR BLD: 60.7 % (ref 38–73)
NRBC BLD-RTO: 0 /100 WBC
NUM UNITS TRANS PACKED RBC: NORMAL
NUM UNITS TRANS PACKED RBC: NORMAL
PHOSPHATE SERPL-MCNC: 2.5 MG/DL (ref 2.7–4.5)
PLATELET # BLD AUTO: 122 K/UL (ref 150–350)
PMV BLD AUTO: 11.9 FL (ref 9.2–12.9)
POTASSIUM SERPL-SCNC: 4.4 MMOL/L (ref 3.5–5.1)
POTASSIUM SERPL-SCNC: 4.4 MMOL/L (ref 3.5–5.1)
PROT SERPL-MCNC: 4.6 G/DL (ref 6–8.4)
RBC # BLD AUTO: 2.68 M/UL (ref 4–5.4)
SODIUM SERPL-SCNC: 141 MMOL/L (ref 136–145)
SODIUM SERPL-SCNC: 141 MMOL/L (ref 136–145)
WBC # BLD AUTO: 4.3 K/UL (ref 3.9–12.7)

## 2020-03-03 PROCEDURE — 63600175 PHARM REV CODE 636 W HCPCS: Performed by: INTERNAL MEDICINE

## 2020-03-03 PROCEDURE — 12000002 HC ACUTE/MED SURGE SEMI-PRIVATE ROOM

## 2020-03-03 PROCEDURE — 63600175 PHARM REV CODE 636 W HCPCS: Performed by: SURGERY

## 2020-03-03 PROCEDURE — 25000003 PHARM REV CODE 250: Performed by: SURGERY

## 2020-03-03 PROCEDURE — B4185 PARENTERAL SOL 10 GM LIPIDS: HCPCS | Performed by: INTERNAL MEDICINE

## 2020-03-03 PROCEDURE — A4217 STERILE WATER/SALINE, 500 ML: HCPCS | Performed by: INTERNAL MEDICINE

## 2020-03-03 PROCEDURE — 93005 ELECTROCARDIOGRAM TRACING: CPT

## 2020-03-03 PROCEDURE — 83735 ASSAY OF MAGNESIUM: CPT

## 2020-03-03 PROCEDURE — C9113 INJ PANTOPRAZOLE SODIUM, VIA: HCPCS | Performed by: SURGERY

## 2020-03-03 PROCEDURE — 25000003 PHARM REV CODE 250: Performed by: INTERNAL MEDICINE

## 2020-03-03 PROCEDURE — 85025 COMPLETE CBC W/AUTO DIFF WBC: CPT

## 2020-03-03 PROCEDURE — 80053 COMPREHEN METABOLIC PANEL: CPT

## 2020-03-03 PROCEDURE — 84100 ASSAY OF PHOSPHORUS: CPT

## 2020-03-03 RX ADMIN — EPOETIN ALFA-EPBX 10000 UNITS: 10000 INJECTION, SOLUTION INTRAVENOUS; SUBCUTANEOUS at 12:03

## 2020-03-03 RX ADMIN — DIPHENHYDRAMINE HYDROCHLORIDE 6.25 MG: 50 INJECTION, SOLUTION INTRAMUSCULAR; INTRAVENOUS at 05:03

## 2020-03-03 RX ADMIN — CHOLESTYRAMINE 4 G: 4 POWDER, FOR SUSPENSION ORAL at 09:03

## 2020-03-03 RX ADMIN — MAGNESIUM OXIDE 400 MG: 400 TABLET ORAL at 10:03

## 2020-03-03 RX ADMIN — MORPHINE SULFATE 2 MG: 2 INJECTION, SOLUTION INTRAMUSCULAR; INTRAVENOUS at 01:03

## 2020-03-03 RX ADMIN — PSYLLIUM HUSK 2 PACKET: 3.4 POWDER ORAL at 03:03

## 2020-03-03 RX ADMIN — PANTOPRAZOLE SODIUM 40 MG: 40 INJECTION, POWDER, LYOPHILIZED, FOR SOLUTION INTRAVENOUS at 09:03

## 2020-03-03 RX ADMIN — LOPERAMIDE HYDROCHLORIDE 4 MG: 2 CAPSULE ORAL at 05:03

## 2020-03-03 RX ADMIN — DIPHENHYDRAMINE HYDROCHLORIDE 6.25 MG: 50 INJECTION, SOLUTION INTRAMUSCULAR; INTRAVENOUS at 12:03

## 2020-03-03 RX ADMIN — LOPERAMIDE HYDROCHLORIDE 4 MG: 2 CAPSULE ORAL at 09:03

## 2020-03-03 RX ADMIN — CHOLESTYRAMINE 4 G: 4 POWDER, FOR SUSPENSION ORAL at 10:03

## 2020-03-03 RX ADMIN — DRONABINOL 2.5 MG: 2.5 CAPSULE ORAL at 06:03

## 2020-03-03 RX ADMIN — LOPERAMIDE HYDROCHLORIDE 4 MG: 2 CAPSULE ORAL at 10:03

## 2020-03-03 RX ADMIN — SERTRALINE HYDROCHLORIDE 50 MG: 50 TABLET ORAL at 10:03

## 2020-03-03 RX ADMIN — PANTOPRAZOLE SODIUM 40 MG: 40 INJECTION, POWDER, LYOPHILIZED, FOR SOLUTION INTRAVENOUS at 10:03

## 2020-03-03 RX ADMIN — LEVOTHYROXINE SODIUM 88 MCG: 88 TABLET ORAL at 05:03

## 2020-03-03 RX ADMIN — DRONABINOL 2.5 MG: 2.5 CAPSULE ORAL at 05:03

## 2020-03-03 RX ADMIN — MORPHINE SULFATE 2 MG: 2 INJECTION, SOLUTION INTRAMUSCULAR; INTRAVENOUS at 05:03

## 2020-03-03 RX ADMIN — SMOFLIPID: 6; 6; 5; 3 INJECTION, EMULSION INTRAVENOUS at 05:03

## 2020-03-03 RX ADMIN — MORPHINE SULFATE 2 MG: 2 INJECTION, SOLUTION INTRAMUSCULAR; INTRAVENOUS at 12:03

## 2020-03-03 RX ADMIN — MAGNESIUM OXIDE 400 MG: 400 TABLET ORAL at 09:03

## 2020-03-03 RX ADMIN — LOPERAMIDE HYDROCHLORIDE 4 MG: 2 CAPSULE ORAL at 01:03

## 2020-03-03 RX ADMIN — SODIUM CHLORIDE, POTASSIUM CHLORIDE, SODIUM LACTATE AND CALCIUM CHLORIDE: 600; 310; 30; 20 INJECTION, SOLUTION INTRAVENOUS at 05:03

## 2020-03-03 RX ADMIN — PSYLLIUM HUSK 2 PACKET: 3.4 POWDER ORAL at 09:03

## 2020-03-03 RX ADMIN — MEROPENEM 500 MG: 500 INJECTION, POWDER, FOR SOLUTION INTRAVENOUS at 10:03

## 2020-03-03 RX ADMIN — PSYLLIUM HUSK 2 PACKET: 3.4 POWDER ORAL at 10:03

## 2020-03-03 NOTE — PT/OT/SLP PROGRESS
Occupational Therapy      Patient Name:  Shara Hutchins   MRN:  [id number]    Patient not seen today secondary to Other (Comment)(Pt with femoral line present, OT to hold tx. ). Will follow-up next service date.    Sussy Kirby OT  3/3/2020

## 2020-03-03 NOTE — PT/OT/SLP PROGRESS
Physical Therapy      Patient Name:  Shara Hutchins   MRN:  [id number]    Patient not seen today secondary to (patient unable to participate in PT treatment 2/2 femoral line present). Will follow-up 03/04/20.    Amanda Ayers PTA

## 2020-03-03 NOTE — NURSING
Spoke with Ariella Key, about missing assessment, stated will chart before end of shift.     Namita Singletary  03/03/2020  4:34 PM

## 2020-03-03 NOTE — CARE UPDATE
Received phone call from Sumi at [facility] and she stated that they would not be able to take the patient with TPN.

## 2020-03-03 NOTE — PROGRESS NOTES
[facility]  Adult Nutrition   Progress Note (Nutrition Support Management)    SUMMARY     Recommendations  Recommendation/Intervention:   1. RD ordered new TPN to be initiated today @ 1700.   2. Clear liquid diet started, recommend advancement as medically able or transitioning back to NPO (dependent on ostomy output) or as per surgeon recommendations. RD will add ensure clear TID if patient remains of liquid diet >24 hours.   3. NST to monitor and manage TPN daily and advance to meet estimated protein and calorie needs accordingly.   4. Blood glucose and electrolytes to be monitored and managed.    Goals:   1. Patient to tolerate TPN.  2. Patient to tolerate diet. ONS to be started.   3. Patient to meet at least 75% of estimated needs via TPN provision and oral intake.   4. Blood glucose and electrolytes to trend towards normal limits.   5. Transitional feeding from TPN to PO as medically appropriate (plans for TPN to continue for about 6 weeks).      Nutrition Goal Status: progressing towards goal  Communication of RD Recs: reviewed with RN    Dietitian Rounds Brief  Clear liquid diet started. Will monitor PO intake, tolerance, and ostomy output. Continue TPN. New TPN ordered.     PARENTERAL NUTRITION PROGRESS NOTE:    Parenteral Nutrition Day # 3  Diagnosis/Indication for PN: high ostomy output, severe malnutrition, expect TPN for 6 weeks   IV Access: Femoral central line   Diet/Tube Feeding: clear liquid         Admixture Type: 3-in-1 (15% AA, 70% dextrose, 20% smoflipid)   Infusion Rate and Frequency: 50 ml/hr, continuous    Patient Acuity: Acute Care     PN Composition:   65 grams Amino Acid, 210 grams Dextrose, and 50 grams Lipid.    Today's TPN provides 1474 kcal.  *Dextrose is started at less than full dextrose goal to reduce risk for Refeeding Syndrome. Dextrose content will be advanced as appropriate over the next few days.    Please see order for electrolytes and additives content and  "adjustments.   *The Nutrition Support Team will continue to monitor electrolytes daily and adjust parenteral nutrition as warranted.    Reason for Assessment  Reason For Assessment: new TPN, RD follow-up  Relevant Medical History: Vulvar cancer, Chronic kidney disease, stage III (moderate), DM2, fatigue, HTN, ileostomy (20), DARCI, Severe dehydration due to chronic diarrhea/high output ileostomy, electrolyte imbalance     Nutrition Risk Screen  Nutrition Risk Screen: tube feeding or parenteral nutrition       Pressure Injury 20 1837 Left Buttocks Stage 2-Wound Image: Images linked       Wound 20 Moisture associated dermatitis medial Perineum #1-Wound Image: Images linked  MST Score: 2  Have you recently lost weight without trying?: Yes: 14-23 lbs  Weight loss score: 2  Have you been eating poorly because of a decreased appetite?: No  Appetite score: 0       Nutrition/Diet History  Patient Reported Diet/Restrictions/Preferences: general  Spiritual, Cultural Beliefs, Mormon Practices, Values that Affect Care: no  Food Allergies: NKFA  Factors Affecting Nutritional Intake: malabsorption, clear liquid diet(short bowel syndrome, ostomy output)    Anthropometrics  Temp: 98.6 °F (37 °C)  Height Method: Stated  Height: 5' 7" (170.2 cm)  Height (inches): 67 in  Weight Method: Bed Scale  Weight: 53 kg (116 lb 13.5 oz)  Weight (lb): 116.84 lb  Ideal Body Weight (IBW), Female: 135 lb  % Ideal Body Weight, Female (lb): 85.57 %  BMI (Calculated): 18.3  BMI Grade: 17 - 18.4 protein-energy malnutrition grade I  Weight Loss: unintentional  Usual Body Weight (UBW), k.8 kg  Weight Change Amount: 28 lb  % Usual Body Weight: 83.25  % Weight Change From Usual Weight: -16.93 %       Weight History:  Wt Readings from Last 10 Encounters:   20 53 kg (116 lb 13.5 oz)   20 58.7 kg (129 lb 4.8 oz)   20 58.8 kg (129 lb 9.6 oz)   20 56.7 kg (125 lb)   20 56.9 kg (125 lb 6.4 oz)   20 " 57.2 kg (126 lb)   01/08/20 61.4 kg (135 lb 6.4 oz)   12/26/19 59 kg (130 lb)   12/11/19 61.8 kg (136 lb 4.8 oz)   11/27/19 63.8 kg (140 lb 10.5 oz)       Lab/Procedures/Meds: Pertinent Labs Reviewed  Clinical Chemistry:  Recent Labs   Lab 02/29/20  0440  03/02/20  0514 03/03/20  0440   *   < > 139  139 141  141   K 3.8   < > 4.5  4.5 4.4  4.4   CL 95   < > 106  106 112*  112*   CO2 32*   < > 29  29 26  26      < > 97  97 107  107   BUN 41*   < > 39*  39* 35*  35*   CREATININE 4.3*   < > 3.1*  3.1* 2.7*  2.7*   CALCIUM 7.8*   < > 8.1*  8.1* 8.3*  8.3*   PROT  --    < > 4.5* 4.6*   ALBUMIN  --    < > 2.5* 2.6*   BILITOT  --    < > 1.5* 1.5*   ALKPHOS  --    < > 303* 241*   AST  --    < > 31 23   ALT  --    < > 22 16   ANIONGAP 7*   < > 4*  4* 3*  3*   ESTGFRAFRICA 11.4*   < > 16.9*  16.9* 20.0*  20.0*   EGFRNONAA 9.9*   < > 14.7*  14.7* 17.3*  17.3*   MG 2.1   < > 1.8 2.2   PHOS 3.9  --  2.6* 2.5*    < > = values in this interval not displayed.     CBC:   Recent Labs   Lab 03/03/20  0440   WBC 4.30   RBC 2.68*   HGB 8.5*   HCT 27.6*   *   *   MCH 31.7*   MCHC 30.8*     Lipid Panel:  Recent Labs   Lab 03/02/20  0514   TRIG 73     Medications: Pertinent Medications reviewed  Scheduled Meds:   cholestyramine-aspartame  1 packet Oral BID    diphenhydrAMINE  6.25 mg Intravenous Q6H    dronabinol  2.5 mg Oral BID AC    levothyroxine  88 mcg Oral QAM    loperamide  4 mg Oral QID    magnesium oxide  400 mg Oral BID    meropenem (MERREM) IVPB  500 mg Intravenous Daily    pantoprazole  40 mg Intravenous BID    potassium chloride 10%  40 mEq Oral BID    psyllium husk (with sugar)  2 packet Oral TID    sertraline  50 mg Oral Daily     Continuous Infusions:   lactated ringers 125 mL/hr at 03/02/20 2109    octreotide (SANDOSTATIN) infusion 25 mcg/hr (02/29/20 1736)    TPN ADULT CENTRAL LINE CUSTOM (3 in 1) 50 mL/hr at 03/02/20 1628    TPN ADULT CENTRAL LINE CUSTOM (3  in 1)       PRN Meds:.sodium chloride, acetaminophen, calcium chloride IVPB, calcium chloride IVPB, calcium chloride IVPB, codeine, dextrose 50%, dextrose 50%, glucagon (human recombinant), glucose, glucose, HYDROcodone-acetaminophen, magnesium oxide, magnesium sulfate IVPB, magnesium sulfate IVPB, magnesium sulfate IVPB, magnesium sulfate IVPB, melatonin, morphine, ondansetron, potassium chloride in water, potassium chloride in water, potassium chloride in water, potassium chloride in water, potassium chloride, potassium chloride, potassium chloride, potassium chloride, sodium chloride 0.9%, sodium chloride 0.9%, sodium phosphate IVPB, sodium phosphate IVPB, sodium phosphate IVPB, sodium phosphate IVPB, sodium phosphate IVPB, Pharmacy to dose Vancomycin consult **AND** vancomycin - pharmacy to dose    Estimated/Assessed Needs    Weight Used For Calorie Calculations: 53 kg (116 lb 13.5 oz)  Energy Calorie Requirements (kcal): 1855 - 2120 (35 - 40)   Energy Need Method: Kcal/kg  Protein Requirements: 53 - 106 (1 - 2 g/kg) dependent on renal function  Weight Used For Protein Calculations: 53 kg (116 lb 13.5 oz)  Fluid Requirements (mL): 1590 (30) or to maintain hydration   Estimated Fluid Requirement Method: other (see comments)  RDA Method (mL): 1855       Nutrition Prescription Ordered  Current Diet Order: clear liquid   Current Nutrition Support Formula Ordered: Other (Comment)(custom TPN )  Current Nutrition Support Rate Ordered: 50 (ml)  Oral Nutrition Supplement: -    Evaluation of Received Nutrient/Fluid Intake  Parenteral Calories (kcal): 1347  Parenteral Protein (gm): 51  Parenteral Fluid (mL): 1200  Energy Calories Required: not meeting needs  Protein Required: meeting needs  Fluid Required: meeting needs  Comments: Clear liquid diet started. Will monitor PO intake, tolerance, and ostomy output. Continue TPN. New TPN ordered.   Tolerance: tolerating     Intake/Output Summary (Last 24 hours) at 3/3/2020  0916  Last data filed at 3/3/2020 0800  Gross per 24 hour   Intake 5280 ml   Output 6500 ml   Net -1220 ml      Nutrition Risk  Level of Risk/Frequency of Follow-up: high     Malnutrition Assessment  Malnutrition Type: chronic illness    Monitor and Evaluation  Food and Nutrient Intake: food and beverage intake, energy intake, parenteral nutrition intake  Food and Nutrient Adminstration: diet order, enteral and parenteral nutrition administration  Physical Activity and Function: nutrition-related ADLs and IADLs, factors affecting access to physical activity  Anthropometric Measurements: weight change, weight, body mass index  Biochemical Data, Medical Tests and Procedures: electrolyte and renal panel, glucose/endocrine profile, lipid profile, gastrointestinal profile, inflammatory profile  Nutrition-Focused Physical Findings: overall appearance, extremities, muscles and bones, head and eyes, skin     Nutrition Follow-Up  RD Follow-up?: Yes     Sandra Lovell RD 03/03/2020 9:20 AM

## 2020-03-03 NOTE — PLAN OF CARE
Problem: Malnutrition  Goal: Improved Nutritional Intake  Outcome: Ongoing, Progressing  Intervention: Promote and Optimize Nutrition Support  Flowsheets (Taken 3/3/2020 0916)  Nutrition Support Management: parenteral nutrition composition adjusted     Problem: Parenteral Nutrition  Goal: Effective Intravenous Nutrition Therapy Delivery  Outcome: Ongoing, Progressing  Intervention: Optimize Intravenous Nutrition Delivery  Flowsheets (Taken 3/3/2020 0916)  Nutrition Support Management: parenteral nutrition composition adjusted

## 2020-03-03 NOTE — PROGRESS NOTES
INPATIENT NEPHROLOGY PROGRESS NOTE  [facility] NEPHROLOGY    Shara BULMARO Hutchins  03/03/2020    Reason for consultation:        lawson    Chief Complaint:   Chief Complaint   Patient presents with    Fatigue     Recent ileosomy, 20lb weight loss this week, vaginal bleeding        History of Present Illness:      69F with HTN, DM2, hypothyroidism, secondary hyperparathyroidism, anemia, vulvar Ca (in remission), CKD3 presentswith abdominal pain, lightheadedness, vaginal bleeding. Had an ileostomy on 2/13, d/c on 2/17 (Dr. Meza) for chronic, debilitating diarrhea ? Pneumatosis intestinalis. Since then, she lost 20 lbs, has intermittent nausea and vomiting that is non-bloody and non-bilious, has high ostomy output - seeing whole pills at times. Pain to her abdominal incision site is worsening, had some lightheadedness when standing. Renal consulted for LAWSON.     2/25 VSS, no new complains.  2/26 VSS, sCr worse today. UO+OO = > 5L today!!! Will have to step up IVF significantly to match loss + some....  2/27 VSS, remains to have high output from ostomy, agree with liquid diet and TPN as d/w Dr. Kearns, no PICC line but a tunneled Gro[place]ong, she is close to dialysis, has AVF in one arm...  2/28 VSS, ostomy output is down and UO is up, agree with liquid diet and TPN as d/w Dr. Kearns, no PICC line but a tunneled Gro[place]ong, she is close to dialysis, has AVF in one arm...  2/29 VSS, ostomy output is down and UO is up, agree with liquid diet and TPN as d/w Dr. Kearns. No PICC line but a tunneled Groshong, she is close to dialysis, has AVF in one arm...  3/1 VSS, no new complains. Femoral cath is suboptimal location for access that has to stay there for 4-6 weeks. Patient will have to be bed ridden and might require LTAC.  3/2 No nausea, chest pain, sob, fever, urinary or bowel complaint, new neurologic symptoms, new joint pain,  3/3 No nausea, chest pain, sob, fever, urinary or bowel complaint, new neurologic symptoms, new joint  pain,            Plan of Care:     Assessment\    Acute kidney injury due to volume depletion  --better after volume resuscitation  --no nsaids, coxibs  -keep map above 55    Anemia  --epogen  --iron panel    Volume depletion due to gi lossess  --better after iv fluids      Hyponatremia  --resolved       Thank you for allowing us to participate in this patient's care. We will continue to follow.    Medications:  No current facility-administered medications on file prior to encounter.      Current Outpatient Medications on File Prior to Encounter   Medication Sig Dispense Refill    calcitRIOL (ROCALTROL) 0.5 MCG Cap Take 1 capsule by mouth once daily.  3    dicyclomine (BENTYL) 10 MG capsule TAKE 1 CAPSULE THREE TIMES A DAY (Patient taking differently: Take 10 mg by mouth 3 (three) times daily. ) 270 capsule 4    diphenhydrAMINE (BENADRYL) 25 mg capsule Take 25 mg by mouth nightly as needed for Itching.      dronabinol (MARINOL) 2.5 MG capsule Take 1 capsule (2.5 mg total) by mouth 2 (two) times daily before meals. 60 capsule 0    HYDROcodone-acetaminophen (NORCO)  mg per tablet Take 1 tablet by mouth every 6 (six) hours as needed for Pain. 30 tablet 0    levothyroxine (SYNTHROID) 88 MCG tablet TAKE 1 TABLET DAILY (Patient taking differently: Take 88 mcg by mouth every morning. ) 90 tablet 4    magnesium oxide (MAG-OX) 400 mg (241.3 mg magnesium) tablet Take 400 mg by mouth 2 (two) times daily.      potassium chloride (KLOR-CON) 10 MEQ TbSR Take 20 mEq by mouth 2 (two) times daily.       promethazine (PHENERGAN) 25 MG tablet Take 1 tablet (25 mg total) by mouth daily as needed for Nausea. 90 tablet 0    sertraline (ZOLOFT) 50 MG tablet Take 50 mg by mouth once daily.       sodium bicarbonate 650 MG tablet Take 650 mg by mouth 3 (three) times daily.   2     Scheduled Meds:   cholestyramine-aspartame  1 packet Oral BID    diphenhydrAMINE  6.25 mg Intravenous Q6H    dronabinol  2.5 mg Oral BID AC     levothyroxine  88 mcg Oral QAM    loperamide  4 mg Oral QID    magnesium oxide  400 mg Oral BID    meropenem (MERREM) IVPB  500 mg Intravenous Daily    pantoprazole  40 mg Intravenous BID    potassium chloride 10%  40 mEq Oral BID    psyllium husk (with sugar)  2 packet Oral TID    sertraline  50 mg Oral Daily     Continuous Infusions:   lactated ringers 125 mL/hr at 03/02/20 2109    octreotide (SANDOSTATIN) infusion 25 mcg/hr (02/29/20 1736)    TPN ADULT CENTRAL LINE CUSTOM (3 in 1) 50 mL/hr at 03/02/20 1628    TPN ADULT CENTRAL LINE CUSTOM (3 in 1)       PRN Meds:.sodium chloride, acetaminophen, calcium chloride IVPB, calcium chloride IVPB, calcium chloride IVPB, codeine, dextrose 50%, dextrose 50%, glucagon (human recombinant), glucose, glucose, HYDROcodone-acetaminophen, magnesium oxide, magnesium sulfate IVPB, magnesium sulfate IVPB, magnesium sulfate IVPB, magnesium sulfate IVPB, melatonin, morphine, ondansetron, potassium chloride in water, potassium chloride in water, potassium chloride in water, potassium chloride in water, potassium chloride, potassium chloride, potassium chloride, potassium chloride, sodium chloride 0.9%, sodium chloride 0.9%, sodium phosphate IVPB, sodium phosphate IVPB, sodium phosphate IVPB, sodium phosphate IVPB, sodium phosphate IVPB, Pharmacy to dose Vancomycin consult **AND** vancomycin - pharmacy to dose    Allergies:  Ciprofloxacin and Pcn [penicillins]    Vital Signs:  Temp Readings from Last 3 Encounters:   03/03/20 98.6 °F (37 °C) (Oral)   02/19/20 97.8 °F (36.6 °C)   02/19/20 98.1 °F (36.7 °C)       Pulse Readings from Last 3 Encounters:   03/03/20 (!) 57   02/19/20 76   02/19/20 78       BP Readings from Last 3 Encounters:   03/03/20 (!) 102/58   02/19/20 131/76   02/19/20 (!) 100/58       Weight:  Wt Readings from Last 3 Encounters:   03/03/20 53 kg (116 lb 13.5 oz)   02/19/20 58.7 kg (129 lb 4.8 oz)   02/19/20 58.8 kg (129 lb 9.6 oz)       Review of  Systems:  Review of Systems - All 14 systems reviewed and negative, except as noted in HPI    Physical Exam:    Constitutional: NAD  Neuro: No asterixis  Psych: Calm  EENT: NCAT, anicteric sclera   Neck:  supple  Cards: No rub  Lungs: clear to ausculation  GI:  Pos bowel sounds, no hsm, NTND   MSK:  WNWD  Skin: no purpura, rashes       Results:  Lab Results   Component Value Date     03/03/2020     03/03/2020    K 4.4 03/03/2020    K 4.4 03/03/2020     (H) 03/03/2020     (H) 03/03/2020    CO2 26 03/03/2020    CO2 26 03/03/2020    BUN 35 (H) 03/03/2020    BUN 35 (H) 03/03/2020    CREATININE 2.7 (H) 03/03/2020    CREATININE 2.7 (H) 03/03/2020    CALCIUM 8.3 (L) 03/03/2020    CALCIUM 8.3 (L) 03/03/2020    ANIONGAP 3 (L) 03/03/2020    ANIONGAP 3 (L) 03/03/2020    ESTGFRAFRICA 20.0 (A) 03/03/2020    ESTGFRAFRICA 20.0 (A) 03/03/2020    EGFRNONAA 17.3 (A) 03/03/2020    EGFRNONAA 17.3 (A) 03/03/2020       Lab Results   Component Value Date    CALCIUM 8.3 (L) 03/03/2020    CALCIUM 8.3 (L) 03/03/2020    PHOS 2.5 (L) 03/03/2020       Recent Labs   Lab 03/03/20  0440   WBC 4.30   RBC 2.68*   HGB 8.5*   HCT 27.6*   *   *   MCH 31.7*   MCHC 30.8*       I have personally reviewed pertinent radiological imaging and reports.

## 2020-03-03 NOTE — PLAN OF CARE
03/03/20 0953   Discharge Reassessment   Assessment Type Discharge Planning Reassessment   Anticipated Discharge Disposition SNF   Discharge Plan A Skilled Nursing Facility   Patient choice form signed by patient/caregiver List with quality metrics by geographic area provided;List from CMS Compare  (Pt choice signed by pt at 0945 and will be scanned into pt's chart. Pt wishes for referrals to be sent to all 4 SNF in [place].)   Post-Acute Status   Post-Acute Authorization Placement   Post-Acute Placement Status Referrals Sent  (Referrals faxed via E96 to [facility] and [facility].)     Update 1036: [facility] completed LOCET and faxed PASRR. A PPD has been ordered.    Update 1434: [facility] received a denial from [facility].

## 2020-03-04 LAB
ALBUMIN SERPL BCP-MCNC: 2.4 G/DL (ref 3.5–5.2)
ALP SERPL-CCNC: 228 U/L (ref 55–135)
ALT SERPL W/O P-5'-P-CCNC: 15 U/L (ref 10–44)
ANION GAP SERPL CALC-SCNC: 6 MMOL/L (ref 8–16)
ANION GAP SERPL CALC-SCNC: 6 MMOL/L (ref 8–16)
AST SERPL-CCNC: 21 U/L (ref 10–40)
BASOPHILS # BLD AUTO: 0.01 K/UL (ref 0–0.2)
BASOPHILS NFR BLD: 0.2 % (ref 0–1.9)
BILIRUB SERPL-MCNC: 1.2 MG/DL (ref 0.1–1)
BUN SERPL-MCNC: 32 MG/DL (ref 8–23)
BUN SERPL-MCNC: 32 MG/DL (ref 8–23)
CALCIUM SERPL-MCNC: 8.1 MG/DL (ref 8.7–10.5)
CALCIUM SERPL-MCNC: 8.1 MG/DL (ref 8.7–10.5)
CHLORIDE SERPL-SCNC: 107 MMOL/L (ref 95–110)
CHLORIDE SERPL-SCNC: 107 MMOL/L (ref 95–110)
CO2 SERPL-SCNC: 28 MMOL/L (ref 23–29)
CO2 SERPL-SCNC: 28 MMOL/L (ref 23–29)
CREAT SERPL-MCNC: 2.4 MG/DL (ref 0.5–1.4)
CREAT SERPL-MCNC: 2.4 MG/DL (ref 0.5–1.4)
DIFFERENTIAL METHOD: ABNORMAL
EOSINOPHIL # BLD AUTO: 0.3 K/UL (ref 0–0.5)
EOSINOPHIL NFR BLD: 6.5 % (ref 0–8)
ERYTHROCYTE [DISTWIDTH] IN BLOOD BY AUTOMATED COUNT: 16.3 % (ref 11.5–14.5)
EST. GFR  (AFRICAN AMERICAN): 23 ML/MIN/1.73 M^2
EST. GFR  (AFRICAN AMERICAN): 23 ML/MIN/1.73 M^2
EST. GFR  (NON AFRICAN AMERICAN): 20 ML/MIN/1.73 M^2
EST. GFR  (NON AFRICAN AMERICAN): 20 ML/MIN/1.73 M^2
FERRITIN SERPL-MCNC: 407 NG/ML (ref 20–300)
GLUCOSE SERPL-MCNC: 102 MG/DL (ref 70–110)
GLUCOSE SERPL-MCNC: 102 MG/DL (ref 70–110)
HCT VFR BLD AUTO: 25.6 % (ref 37–48.5)
HGB BLD-MCNC: 7.9 G/DL (ref 12–16)
IMM GRANULOCYTES # BLD AUTO: 0.01 K/UL (ref 0–0.04)
IMM GRANULOCYTES NFR BLD AUTO: 0.2 % (ref 0–0.5)
IRON SERPL-MCNC: 34 UG/DL (ref 30–160)
LYMPHOCYTES # BLD AUTO: 0.8 K/UL (ref 1–4.8)
LYMPHOCYTES NFR BLD: 17.7 % (ref 18–48)
MAGNESIUM SERPL-MCNC: 2.2 MG/DL (ref 1.6–2.6)
MCH RBC QN AUTO: 31.6 PG (ref 27–31)
MCHC RBC AUTO-ENTMCNC: 30.9 G/DL (ref 32–36)
MCV RBC AUTO: 102 FL (ref 82–98)
MONOCYTES # BLD AUTO: 0.4 K/UL (ref 0.3–1)
MONOCYTES NFR BLD: 9 % (ref 4–15)
NEUTROPHILS # BLD AUTO: 3 K/UL (ref 1.8–7.7)
NEUTROPHILS NFR BLD: 66.4 % (ref 38–73)
NRBC BLD-RTO: 0 /100 WBC
PHOSPHATE SERPL-MCNC: 2.3 MG/DL (ref 2.7–4.5)
PLATELET # BLD AUTO: 95 K/UL (ref 150–350)
PMV BLD AUTO: 12.5 FL (ref 9.2–12.9)
POTASSIUM SERPL-SCNC: 3.8 MMOL/L (ref 3.5–5.1)
POTASSIUM SERPL-SCNC: 3.8 MMOL/L (ref 3.5–5.1)
PROT SERPL-MCNC: 4.4 G/DL (ref 6–8.4)
RBC # BLD AUTO: 2.5 M/UL (ref 4–5.4)
SATURATED IRON: 23 % (ref 20–50)
SODIUM SERPL-SCNC: 141 MMOL/L (ref 136–145)
SODIUM SERPL-SCNC: 141 MMOL/L (ref 136–145)
TOTAL IRON BINDING CAPACITY: 150 UG/DL (ref 250–450)
TRANSFERRIN SERPL-MCNC: 107 MG/DL (ref 200–375)
VANCOMYCIN SERPL-MCNC: 14.7 UG/ML
WBC # BLD AUTO: 4.46 K/UL (ref 3.9–12.7)

## 2020-03-04 PROCEDURE — 85025 COMPLETE CBC W/AUTO DIFF WBC: CPT

## 2020-03-04 PROCEDURE — 63600175 PHARM REV CODE 636 W HCPCS: Performed by: SURGERY

## 2020-03-04 PROCEDURE — 25000003 PHARM REV CODE 250: Performed by: SURGERY

## 2020-03-04 PROCEDURE — C9113 INJ PANTOPRAZOLE SODIUM, VIA: HCPCS | Performed by: SURGERY

## 2020-03-04 PROCEDURE — 25000003 PHARM REV CODE 250: Performed by: INTERNAL MEDICINE

## 2020-03-04 PROCEDURE — A4217 STERILE WATER/SALINE, 500 ML: HCPCS | Performed by: INTERNAL MEDICINE

## 2020-03-04 PROCEDURE — 83735 ASSAY OF MAGNESIUM: CPT

## 2020-03-04 PROCEDURE — 80053 COMPREHEN METABOLIC PANEL: CPT

## 2020-03-04 PROCEDURE — 63600175 PHARM REV CODE 636 W HCPCS: Performed by: INTERNAL MEDICINE

## 2020-03-04 PROCEDURE — 80202 ASSAY OF VANCOMYCIN: CPT

## 2020-03-04 PROCEDURE — 82728 ASSAY OF FERRITIN: CPT

## 2020-03-04 PROCEDURE — 83540 ASSAY OF IRON: CPT

## 2020-03-04 PROCEDURE — 84100 ASSAY OF PHOSPHORUS: CPT

## 2020-03-04 PROCEDURE — 12000002 HC ACUTE/MED SURGE SEMI-PRIVATE ROOM

## 2020-03-04 PROCEDURE — B4185 PARENTERAL SOL 10 GM LIPIDS: HCPCS | Performed by: INTERNAL MEDICINE

## 2020-03-04 PROCEDURE — 97110 THERAPEUTIC EXERCISES: CPT

## 2020-03-04 RX ADMIN — PSYLLIUM HUSK 2 PACKET: 3.4 POWDER ORAL at 03:03

## 2020-03-04 RX ADMIN — DIPHENHYDRAMINE HYDROCHLORIDE 6.25 MG: 50 INJECTION, SOLUTION INTRAMUSCULAR; INTRAVENOUS at 05:03

## 2020-03-04 RX ADMIN — LEVOTHYROXINE SODIUM 88 MCG: 88 TABLET ORAL at 05:03

## 2020-03-04 RX ADMIN — LOPERAMIDE HYDROCHLORIDE 4 MG: 2 CAPSULE ORAL at 12:03

## 2020-03-04 RX ADMIN — DIPHENHYDRAMINE HYDROCHLORIDE 6.25 MG: 50 INJECTION, SOLUTION INTRAMUSCULAR; INTRAVENOUS at 12:03

## 2020-03-04 RX ADMIN — SERTRALINE HYDROCHLORIDE 50 MG: 50 TABLET ORAL at 09:03

## 2020-03-04 RX ADMIN — VANCOMYCIN HYDROCHLORIDE 750 MG: 1 INJECTION, POWDER, LYOPHILIZED, FOR SOLUTION INTRAVENOUS at 12:03

## 2020-03-04 RX ADMIN — PSYLLIUM HUSK 2 PACKET: 3.4 POWDER ORAL at 09:03

## 2020-03-04 RX ADMIN — PANTOPRAZOLE SODIUM 40 MG: 40 INJECTION, POWDER, LYOPHILIZED, FOR SOLUTION INTRAVENOUS at 09:03

## 2020-03-04 RX ADMIN — DRONABINOL 2.5 MG: 2.5 CAPSULE ORAL at 05:03

## 2020-03-04 RX ADMIN — PSYLLIUM HUSK 2 PACKET: 3.4 POWDER ORAL at 08:03

## 2020-03-04 RX ADMIN — LOPERAMIDE HYDROCHLORIDE 4 MG: 2 CAPSULE ORAL at 08:03

## 2020-03-04 RX ADMIN — LOPERAMIDE HYDROCHLORIDE 4 MG: 2 CAPSULE ORAL at 09:03

## 2020-03-04 RX ADMIN — LOPERAMIDE HYDROCHLORIDE 4 MG: 2 CAPSULE ORAL at 05:03

## 2020-03-04 RX ADMIN — MORPHINE SULFATE 2 MG: 2 INJECTION, SOLUTION INTRAMUSCULAR; INTRAVENOUS at 05:03

## 2020-03-04 RX ADMIN — SMOFLIPID: 6; 6; 5; 3 INJECTION, EMULSION INTRAVENOUS at 05:03

## 2020-03-04 RX ADMIN — DRONABINOL 2.5 MG: 2.5 CAPSULE ORAL at 04:03

## 2020-03-04 RX ADMIN — MAGNESIUM OXIDE 400 MG: 400 TABLET ORAL at 08:03

## 2020-03-04 RX ADMIN — PANTOPRAZOLE SODIUM 40 MG: 40 INJECTION, POWDER, LYOPHILIZED, FOR SOLUTION INTRAVENOUS at 08:03

## 2020-03-04 RX ADMIN — HYDROCODONE BITARTRATE AND ACETAMINOPHEN 1 TABLET: 5; 325 TABLET ORAL at 05:03

## 2020-03-04 RX ADMIN — CHOLESTYRAMINE 4 G: 4 POWDER, FOR SUSPENSION ORAL at 08:03

## 2020-03-04 RX ADMIN — POTASSIUM CHLORIDE 40 MEQ: 1.5 SOLUTION ORAL at 08:03

## 2020-03-04 RX ADMIN — MAGNESIUM OXIDE 400 MG: 400 TABLET ORAL at 09:03

## 2020-03-04 RX ADMIN — CHOLESTYRAMINE 4 G: 4 POWDER, FOR SUSPENSION ORAL at 09:03

## 2020-03-04 RX ADMIN — MORPHINE SULFATE 2 MG: 2 INJECTION, SOLUTION INTRAMUSCULAR; INTRAVENOUS at 12:03

## 2020-03-04 RX ADMIN — DIPHENHYDRAMINE HYDROCHLORIDE 6.25 MG: 50 INJECTION, SOLUTION INTRAMUSCULAR; INTRAVENOUS at 01:03

## 2020-03-04 RX ADMIN — SODIUM CHLORIDE, POTASSIUM CHLORIDE, SODIUM LACTATE AND CALCIUM CHLORIDE: 600; 310; 30; 20 INJECTION, SOLUTION INTRAVENOUS at 01:03

## 2020-03-04 RX ADMIN — MEROPENEM 500 MG: 500 INJECTION, POWDER, FOR SOLUTION INTRAVENOUS at 09:03

## 2020-03-04 NOTE — PLAN OF CARE
Problem: Malnutrition  Goal: Improved Nutritional Intake  Outcome: Ongoing, Progressing  Intervention: Promote and Optimize Nutrition Support  Flowsheets (Taken 3/4/2020 0851)  Nutrition Support Management: parenteral nutrition composition adjusted     Problem: Parenteral Nutrition  Goal: Effective Intravenous Nutrition Therapy Delivery  Outcome: Ongoing, Progressing  Intervention: Optimize Intravenous Nutrition Delivery  Flowsheets (Taken 3/4/2020 0851)  Nutrition Support Management: parenteral nutrition composition adjusted

## 2020-03-04 NOTE — PT/OT/SLP PROGRESS
Occupational Therapy   Treatment    Name: Shara Hutchins  MRN: [id number]  Admitting Diagnosis:  Severe dehydration  5 Days Post-Op    Recommendations:     Discharge Recommendations: home health OT  Discharge Equipment Recommendations:  none  Barriers to discharge:       Assessment:     Shara Hutchins is a 69 y.o. female with a medical diagnosis of Severe dehydration.  She presents with improving medical acuity, but unable to participate in EOB or OOB activity secondary to having a femoral central line catheter. Perform BUE Therex bed level using yellow theraband. Performance deficits affecting function are weakness, impaired endurance, impaired self care skills, impaired functional mobilty.     Rehab Prognosis:  Fair; patient would benefit from acute skilled OT services to address these deficits and reach maximum level of function.       Plan:     Patient to be seen 3 x/week to address the above listed problems via self-care/home management, therapeutic activities, therapeutic exercises  · Plan of Care Expires: 03/25/20  · Plan of Care Reviewed with: patient    Subjective     Pain/Comfort:  · Pain Rating 1: 0/10  · Pain Rating Post-Intervention 1: 0/10    Objective:     Communicated with: nurse prior to session.  Patient found HOB elevated with telemetry, peripheral IV, central line(Femoral cathter-central line) upon OT entry to room.    General Precautions: Standard, fall   Orthopedic Precautions:N/A   Braces: N/A     Occupational Performance:     BUE Therex:    · Performed 10 repetitions of BUE Therex using yellow theraband to strengthen biceps, triceps and shoulders with minimal assistance.     [facility] 6 Click ADL:      Treatment & Education:  Patient demonstrated BUE Therex for biceps, triceps and shoulders to prevent muscle breakdown while on bedrest.     Patient left supine with all lines intact and call button in reachEducation:      GOALS:   Multidisciplinary Problems     Occupational Therapy Goals        Problem:  Occupational Therapy Goal    Goal Priority Disciplines Outcome Interventions   Occupational Therapy Goal     OT, PT/OT Unable to Meet, Plan Revised    Description:  Goals to be met by: discharge    Patient will increase functional independence with ADLs by performing:    Pt to be independent with UE HEP program of 3 x 10 reps each for reconditioning  UE Dressing with Supervision.  LE Dressing with Supervision.  Grooming while standing with Modified [place].  Toileting from toilet with Modified [place] for hygiene and clothing management.   Toilet transfer to toilet with Supervision.                       Time Tracking:     OT Date of Treatment:    OT Start Time: 0911  OT Stop Time: 0926  OT Total Time (min): 15 min    Billable Minutes:Therapeutic Exercise 15    Dashawn Sutherland OT  3/4/2020

## 2020-03-04 NOTE — PT/OT/SLP PROGRESS
Physical Therapy      Patient Name:  Shara Hutchins   MRN:  [id number]    Patient not seen today secondary to Other (Comment)(PT with femoral line which requires bedrest). Hip flexion contraindicated with femoral line therefore pt on bedrest. Will DC PT order. New orders needed when patient ready for OOB activity.    Nereyda Zhong, PT

## 2020-03-04 NOTE — SUBJECTIVE & OBJECTIVE
Interval History: Pt states that they can find no IV sites.    Review of Systems   Constitutional: Positive for activity change and appetite change. Negative for chills and fever.   HENT: Negative.    Eyes: Negative.    Respiratory: Negative.    Cardiovascular: Negative.    Gastrointestinal: Positive for abdominal pain.        Ileostomy   Genitourinary: Negative.    Musculoskeletal: Positive for arthralgias.   Neurological: Positive for dizziness.   Psychiatric/Behavioral: Negative.      Objective:     Vital Signs (Most Recent):  Temp: 98.3 °F (36.8 °C) (03/03/20 1615)  Pulse: 61 (03/03/20 1615)  Resp: 18 (03/03/20 1615)  BP: (!) 107/57 (03/03/20 1615)  SpO2: 100 % (03/03/20 1100) Vital Signs (24h Range):  Temp:  [97.7 °F (36.5 °C)-98.6 °F (37 °C)] 98.3 °F (36.8 °C)  Pulse:  [57-67] 61  Resp:  [18] 18  SpO2:  [96 %-100 %] 100 %  BP: (102-131)/(52-65) 107/57     Weight: 53 kg (116 lb 13.5 oz)  Body mass index is 18.3 kg/m².    Intake/Output Summary (Last 24 hours) at 3/3/2020 1817  Last data filed at 3/3/2020 1200  Gross per 24 hour   Intake 2580 ml   Output 5350 ml   Net -2770 ml      Physical Exam   HENT:   Head: Normocephalic and atraumatic.   Eyes: Pupils are equal, round, and reactive to light. EOM are normal.   Neck: Normal range of motion. Neck supple.   Cardiovascular: Normal rate, regular rhythm and normal heart sounds.   Pulmonary/Chest: Effort normal and breath sounds normal.   Abdominal: Soft. Bowel sounds are normal.   Musculoskeletal: Normal range of motion.   Psychiatric: She has a normal mood and affect.       Significant Labs:   BMP:   Recent Labs   Lab 03/03/20  0440     107     141   K 4.4  4.4   *  112*   CO2 26  26   BUN 35*  35*   CREATININE 2.7*  2.7*   CALCIUM 8.3*  8.3*   MG 2.2     CBC:   Recent Labs   Lab 03/02/20  0514 03/03/20  0440   WBC 3.80* 4.30   HGB 8.4* 8.5*   HCT 26.9* 27.6*   * 122*     CMP:   Recent Labs   Lab 03/02/20  0514 03/03/20  0440   NA  139  139 141  141   K 4.5  4.5 4.4  4.4     106 112*  112*   CO2 29  29 26  26   GLU 97  97 107  107   BUN 39*  39* 35*  35*   CREATININE 3.1*  3.1* 2.7*  2.7*   CALCIUM 8.1*  8.1* 8.3*  8.3*   PROT 4.5* 4.6*   ALBUMIN 2.5* 2.6*   BILITOT 1.5* 1.5*   ALKPHOS 303* 241*   AST 31 23   ALT 22 16   ANIONGAP 4*  4* 3*  3*   EGFRNONAA 14.7*  14.7* 17.3*  17.3*     Cardiac Markers: No results for input(s): CKMB, MYOGLOBIN, BNP, TROPISTAT in the last 48 hours.  Prealbumin:   Recent Labs   Lab 03/02/20  0514   PREALBUMIN 15*     TSH:   Recent Labs   Lab 02/23/20  1530   TSH 7.180*       Significant Imaging: I have reviewed all pertinent imaging results/findings within the past 24 hours.

## 2020-03-04 NOTE — NURSING
Follow up        03/04/20 1529        Pressure Injury 02/27/20 1837 Left Buttocks Stage 2   Date First Assessed/Time First Assessed: 02/27/20 1837   Pressure Injury Present on Admission: suspected hospital acquired  Side: Left  Location: Buttocks  Staging: Stage 2   Wound Image    Staging Stage 2   Dressing Appearance Intact;Moist drainage   Drainage Amount Scant   Drainage Characteristics/Odor Serosanguineous   Appearance [place];Red   Periwound Area Denuded   Wound Edges Irregular   Wound Length (cm) 3 cm   Wound Width (cm) 2 cm   Wound Depth (cm) 0.2 cm   Wound Volume (cm^3) 1.2 cm^3   Wound Surface Area (cm^2) 6 cm^2   Care Cleansed with:;Antimicrobial agent   Dressing Applied;Honey;Island/border        Pressure Injury 02/27/20 1837 Right Buttocks Stage 2   Date First Assessed/Time First Assessed: 02/27/20 1837   Pressure Injury Present on Admission: suspected hospital acquired  Side: Right  Location: Buttocks  Staging: Stage 2   Staging Stage 2   Dressing Appearance Intact;Moist drainage   Drainage Amount Small   Drainage Characteristics/Odor Serosanguineous   Appearance [place];Red   Periwound Area Denuded   Wound Edges Irregular   Wound Length (cm) 2 cm   Wound Width (cm) 2 cm   Wound Depth (cm) 0.1 cm   Wound Volume (cm^3) 0.4 cm^3   Wound Surface Area (cm^2) 4 cm^2   Care Cleansed with:;Antimicrobial agent   Dressing Applied;Honey;Island/border     bilat buttock  Clean with chlorhexidine/ns.  Pat dry. Apply Medihoney and cover with mepilex.

## 2020-03-04 NOTE — PROGRESS NOTES
INPATIENT NEPHROLOGY PROGRESS NOTE  [facility] NEPHROLOGY    Shara BULMARO Hutchins  03/04/2020    Reason for consultation:        lawson    Chief Complaint:   Chief Complaint   Patient presents with    Fatigue     Recent ileosomy, 20lb weight loss this week, vaginal bleeding        History of Present Illness:      69F with HTN, DM2, hypothyroidism, secondary hyperparathyroidism, anemia, vulvar Ca (in remission), CKD3 presentswith abdominal pain, lightheadedness, vaginal bleeding. Had an ileostomy on 2/13, d/c on 2/17 (Dr. Meza) for chronic, debilitating diarrhea ? Pneumatosis intestinalis. Since then, she lost 20 lbs, has intermittent nausea and vomiting that is non-bloody and non-bilious, has high ostomy output - seeing whole pills at times. Pain to her abdominal incision site is worsening, had some lightheadedness when standing. Renal consulted for LAWSON.     2/25 VSS, no new complains.  2/26 VSS, sCr worse today. UO+OO = > 5L today!!! Will have to step up IVF significantly to match loss + some....  2/27 VSS, remains to have high output from ostomy, agree with liquid diet and TPN as d/w Dr. Kearns, no PICC line but a tunneled [place], she is close to dialysis, has AVF in one arm...  2/28 VSS, ostomy output is down and UO is up, agree with liquid diet and TPN as d/w Dr. Kearns, no PICC line but a tunneled [place], she is close to dialysis, has AVF in one arm...  2/29 VSS, ostomy output is down and UO is up, agree with liquid diet and TPN as d/w Dr. Kearns. No PICC line but a tunneled [place]shong, she is close to dialysis, has AVF in one arm...  3/1 VSS, no new complains. Femoral cath is suboptimal location for access that has to stay there for 4-6 weeks. Patient will have to be bed ridden and might require LTAC.  3/2 No nausea, chest pain, sob, fever, urinary or bowel complaint, new neurologic symptoms, new joint pain,  3/3 No nausea, chest pain, sob, fever, urinary or bowel complaint, new neurologic symptoms, new joint  pain,  3/4  Renal function improving.  UOP 3.3L.  Feels better, no new complaints.    Plan of Care:     Assessment\    Acute kidney injury due to volume depletion  --better after volume resuscitation  --no nsaids, coxibs  -keep map above 55    Anemia  --epogen  --iron panel    Volume depletion due to gi lossess  --better after iv fluids      Hyponatremia  --resolved       Thank you for allowing us to participate in this patient's care. We will continue to follow.    Medications:  No current facility-administered medications on file prior to encounter.      Current Outpatient Medications on File Prior to Encounter   Medication Sig Dispense Refill    calcitRIOL (ROCALTROL) 0.5 MCG Cap Take 1 capsule by mouth once daily.  3    dicyclomine (BENTYL) 10 MG capsule TAKE 1 CAPSULE THREE TIMES A DAY (Patient taking differently: Take 10 mg by mouth 3 (three) times daily. ) 270 capsule 4    diphenhydrAMINE (BENADRYL) 25 mg capsule Take 25 mg by mouth nightly as needed for Itching.      dronabinol (MARINOL) 2.5 MG capsule Take 1 capsule (2.5 mg total) by mouth 2 (two) times daily before meals. 60 capsule 0    HYDROcodone-acetaminophen (NORCO)  mg per tablet Take 1 tablet by mouth every 6 (six) hours as needed for Pain. 30 tablet 0    levothyroxine (SYNTHROID) 88 MCG tablet TAKE 1 TABLET DAILY (Patient taking differently: Take 88 mcg by mouth every morning. ) 90 tablet 4    magnesium oxide (MAG-OX) 400 mg (241.3 mg magnesium) tablet Take 400 mg by mouth 2 (two) times daily.      potassium chloride (KLOR-CON) 10 MEQ TbSR Take 20 mEq by mouth 2 (two) times daily.       promethazine (PHENERGAN) 25 MG tablet Take 1 tablet (25 mg total) by mouth daily as needed for Nausea. 90 tablet 0    sertraline (ZOLOFT) 50 MG tablet Take 50 mg by mouth once daily.       sodium bicarbonate 650 MG tablet Take 650 mg by mouth 3 (three) times daily.   2     Scheduled Meds:   cholestyramine-aspartame  1 packet Oral BID     diphenhydrAMINE  6.25 mg Intravenous Q6H    dronabinol  2.5 mg Oral BID AC    levothyroxine  88 mcg Oral QAM    loperamide  4 mg Oral QID    magnesium oxide  400 mg Oral BID    meropenem (MERREM) IVPB  500 mg Intravenous Daily    pantoprazole  40 mg Intravenous BID    potassium chloride 10%  40 mEq Oral BID    psyllium husk (with sugar)  2 packet Oral TID    sertraline  50 mg Oral Daily     Continuous Infusions:   lactated ringers 125 mL/hr at 03/04/20 0111    octreotide (SANDOSTATIN) infusion 25 mcg/hr (02/29/20 1736)    TPN ADULT CENTRAL LINE CUSTOM (3 in 1) 50 mL/hr at 03/03/20 1740    TPN ADULT CENTRAL LINE CUSTOM (3 in 1)       PRN Meds:.sodium chloride, acetaminophen, calcium chloride IVPB, calcium chloride IVPB, calcium chloride IVPB, codeine, dextrose 50%, dextrose 50%, glucagon (human recombinant), glucose, glucose, HYDROcodone-acetaminophen, magnesium oxide, magnesium sulfate IVPB, magnesium sulfate IVPB, magnesium sulfate IVPB, magnesium sulfate IVPB, melatonin, morphine, ondansetron, potassium chloride in water, potassium chloride in water, potassium chloride in water, potassium chloride in water, potassium chloride, potassium chloride, potassium chloride, potassium chloride, sodium chloride 0.9%, sodium chloride 0.9%, sodium phosphate IVPB, sodium phosphate IVPB, sodium phosphate IVPB, sodium phosphate IVPB, sodium phosphate IVPB, Pharmacy to dose Vancomycin consult **AND** vancomycin - pharmacy to dose    Allergies:  Ciprofloxacin and Pcn [penicillins]    Vital Signs:  Temp Readings from Last 3 Encounters:   03/04/20 98 °F (36.7 °C) (Oral)   02/19/20 97.8 °F (36.6 °C)   02/19/20 98.1 °F (36.7 °C)       Pulse Readings from Last 3 Encounters:   03/04/20 65   02/19/20 76   02/19/20 78       BP Readings from Last 3 Encounters:   03/04/20 128/67   02/19/20 131/76   02/19/20 (!) 100/58       Weight:  Wt Readings from Last 3 Encounters:   03/03/20 53 kg (116 lb 13.5 oz)   02/19/20 58.7 kg (129 lb  4.8 oz)   02/19/20 58.8 kg (129 lb 9.6 oz)       Review of Systems:  Review of Systems - All 14 systems reviewed and negative, except as noted in HPI    Physical Exam:    Constitutional: NAD  Neuro: No asterixis  Psych: Calm  EENT: NCAT, anicteric sclera   Neck:  supple  Cards: No rub  Lungs: clear to ausculation  GI:  Pos bowel sounds, no hsm, NTND   MSK:  WNWD  Skin: no purpura, rashes       Results:  Lab Results   Component Value Date     03/04/2020     03/04/2020    K 3.8 03/04/2020    K 3.8 03/04/2020     03/04/2020     03/04/2020    CO2 28 03/04/2020    CO2 28 03/04/2020    BUN 32 (H) 03/04/2020    BUN 32 (H) 03/04/2020    CREATININE 2.4 (H) 03/04/2020    CREATININE 2.4 (H) 03/04/2020    CALCIUM 8.1 (L) 03/04/2020    CALCIUM 8.1 (L) 03/04/2020    ANIONGAP 6 (L) 03/04/2020    ANIONGAP 6 (L) 03/04/2020    ESTGFRAFRICA 23.0 (A) 03/04/2020    ESTGFRAFRICA 23.0 (A) 03/04/2020    EGFRNONAA 20.0 (A) 03/04/2020    EGFRNONAA 20.0 (A) 03/04/2020       Lab Results   Component Value Date    CALCIUM 8.1 (L) 03/04/2020    CALCIUM 8.1 (L) 03/04/2020    PHOS 2.3 (L) 03/04/2020       Recent Labs   Lab 03/04/20  0457   WBC 4.46   RBC 2.50*   HGB 7.9*   HCT 25.6*   PLT 95*   *   MCH 31.6*   MCHC 30.9*       I have personally reviewed pertinent radiological imaging and reports.

## 2020-03-04 NOTE — PROGRESS NOTES
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  Patient reported weight loss of 20 lb in 1 week, since discharge from the hospital  Patient admitted to ICU, currently hypotensive but not on vasopressor.  Awake alert and oriented.  Patient requested a DNR status while in house.  General surgery, nephrology, Gastroenterology, dietitian and wound care consulted    3/3: I have consulted IR for central line placement. Pt has a right femoral line in placed for TPN.    Interval History: Pt states that they can find no IV sites.    Review of Systems   Constitutional: Positive for activity change and appetite change. Negative for chills and fever.   HENT: Negative.    Eyes: Negative.    Respiratory: Negative.    Cardiovascular: Negative.    Gastrointestinal: Positive for abdominal pain.        Ileostomy   Genitourinary: Negative.    Musculoskeletal: Positive for arthralgias.   Neurological: Positive for dizziness.   Psychiatric/Behavioral: Negative.      Objective:     Vital Signs (Most Recent):  Temp: 98.3 °F (36.8 °C) (03/03/20 1615)  Pulse: 61 (03/03/20 1615)  Resp: 18 (03/03/20 1615)  BP: (!) 107/57 (03/03/20 1615)  SpO2: 100 % (03/03/20 1100) Vital Signs (24h Range):  Temp:  [97.7 °F (36.5 °C)-98.6 °F (37 °C)] 98.3 °F (36.8 °C)  Pulse:  [57-67] 61  Resp:  [18] 18  SpO2:  [96 %-100 %] 100 %  BP:  (102-131)/(52-65) 107/57     Weight: 53 kg (116 lb 13.5 oz)  Body mass index is 18.3 kg/m².    Intake/Output Summary (Last 24 hours) at 3/3/2020 1817  Last data filed at 3/3/2020 1200  Gross per 24 hour   Intake 2580 ml   Output 5350 ml   Net -2770 ml      Physical Exam   HENT:   Head: Normocephalic and atraumatic.   Eyes: Pupils are equal, round, and reactive to light. EOM are normal.   Neck: Normal range of motion. Neck supple.   Cardiovascular: Normal rate, regular rhythm and normal heart sounds.   Pulmonary/Chest: Effort normal and breath sounds normal.   Abdominal: Soft. Bowel sounds are normal.   Musculoskeletal: Normal range of motion.   Psychiatric: She has a normal mood and affect.       Significant Labs:   BMP:   Recent Labs   Lab 03/03/20  0440     107     141   K 4.4  4.4   *  112*   CO2 26  26   BUN 35*  35*   CREATININE 2.7*  2.7*   CALCIUM 8.3*  8.3*   MG 2.2     CBC:   Recent Labs   Lab 03/02/20  0514 03/03/20  0440   WBC 3.80* 4.30   HGB 8.4* 8.5*   HCT 26.9* 27.6*   * 122*     CMP:   Recent Labs   Lab 03/02/20  0514 03/03/20  0440     139 141  141   K 4.5  4.5 4.4  4.4     106 112*  112*   CO2 29  29 26  26   GLU 97  97 107  107   BUN 39*  39* 35*  35*   CREATININE 3.1*  3.1* 2.7*  2.7*   CALCIUM 8.1*  8.1* 8.3*  8.3*   PROT 4.5* 4.6*   ALBUMIN 2.5* 2.6*   BILITOT 1.5* 1.5*   ALKPHOS 303* 241*   AST 31 23   ALT 22 16   ANIONGAP 4*  4* 3*  3*   EGFRNONAA 14.7*  14.7* 17.3*  17.3*     Cardiac Markers: No results for input(s): CKMB, MYOGLOBIN, BNP, TROPISTAT in the last 48 hours.  Prealbumin:   Recent Labs   Lab 03/02/20  0514   PREALBUMIN 15*     TSH:   Recent Labs   Lab 02/23/20  1530   TSH 7.180*       Significant Imaging: I have reviewed all pertinent imaging results/findings within the past 24 hours.      Assessment/Plan:      No notes have been filed under this hospital service.  Service: Hospital Medicine    VTE Risk Mitigation  (From admission, onward)         Ordered     IP VTE HIGH RISK PATIENT  Once      02/23/20 2025     Reason for No Pharmacological VTE Prophylaxis  Once     Question:  Reasons:  Answer:  Risk of Bleeding    02/23/20 2025                      Ashwin Diaz MD  Department of Hospital Medicine   Critical access hospital

## 2020-03-04 NOTE — SUBJECTIVE & OBJECTIVE
Interval History: We will attempt to sent the patient to a higher [facility] of interventional radiology care for venous access    Review of Systems   Constitutional: Positive for fatigue. Negative for chills and fever.   HENT: Negative.    Eyes: Negative.    Respiratory: Negative.    Cardiovascular: Negative.    Gastrointestinal:        Ileostomy   Genitourinary: Negative.    Musculoskeletal: Negative.    Neurological: Negative.    Psychiatric/Behavioral: Positive for dysphoric mood.     Objective:     Vital Signs (Most Recent):  Temp: 98 °F (36.7 °C) (03/04/20 1108)  Pulse: 65 (03/04/20 1108)  Resp: 17 (03/04/20 1108)  BP: 128/67 (03/04/20 1108)  SpO2: 98 % (03/04/20 1108) Vital Signs (24h Range):  Temp:  [98 °F (36.7 °C)-98.7 °F (37.1 °C)] 98 °F (36.7 °C)  Pulse:  [56-78] 65  Resp:  [16-18] 17  SpO2:  [97 %-99 %] 98 %  BP: ()/(56-68) 128/67     Weight: 53 kg (116 lb 13.5 oz)  Body mass index is 18.3 kg/m².    Intake/Output Summary (Last 24 hours) at 3/4/2020 1512  Last data filed at 3/4/2020 1200  Gross per 24 hour   Intake 3700 ml   Output 4220 ml   Net -520 ml      Physical Exam   Constitutional: No distress.   HENT:   Head: Normocephalic and atraumatic.   Eyes: Pupils are equal, round, and reactive to light. EOM are normal.   Neck: Normal range of motion. Neck supple.   Cardiovascular: Normal rate and regular rhythm.   Pulmonary/Chest: Effort normal and breath sounds normal.   Abdominal: Soft. She exhibits distension.   Musculoskeletal: Normal range of motion.   Skin: She is not diaphoretic.   Psychiatric: She has a normal mood and affect.       Significant Labs:   BMP:   Recent Labs   Lab 03/04/20 0457     102     141   K 3.8  3.8     107   CO2 28  28   BUN 32*  32*   CREATININE 2.4*  2.4*   CALCIUM 8.1*  8.1*   MG 2.2     CBC:   Recent Labs   Lab 03/03/20 0440 03/04/20 0457   WBC 4.30 4.46   HGB 8.5* 7.9*   HCT 27.6* 25.6*   * 95*     CMP:   Recent Labs   Lab  03/03/20  0440 03/04/20  0457     141 141  141   K 4.4  4.4 3.8  3.8   *  112* 107  107   CO2 26  26 28  28     107 102  102   BUN 35*  35* 32*  32*   CREATININE 2.7*  2.7* 2.4*  2.4*   CALCIUM 8.3*  8.3* 8.1*  8.1*   PROT 4.6* 4.4*   ALBUMIN 2.6* 2.4*   BILITOT 1.5* 1.2*   ALKPHOS 241* 228*   AST 23 21   ALT 16 15   ANIONGAP 3*  3* 6*  6*   EGFRNONAA 17.3*  17.3* 20.0*  20.0*     Cardiac Markers: No results for input(s): CKMB, MYOGLOBIN, BNP, TROPISTAT in the last 48 hours.  Magnesium:   Recent Labs   Lab 03/03/20 0440 03/04/20  0457   MG 2.2 2.2       Significant Imaging: I have reviewed all pertinent imaging results/findings within the past 24 hours.

## 2020-03-04 NOTE — PLAN OF CARE
Problem: Fall Injury Risk  Goal: Absence of Fall and Fall-Related Injury  Outcome: Ongoing, Progressing     Problem: Adult Inpatient Plan of Care  Goal: Plan of Care Review  Outcome: Ongoing, Progressing  Goal: Patient-Specific Goal (Individualization)  Outcome: Ongoing, Progressing  Goal: Absence of Hospital-Acquired Illness or Injury  Outcome: Ongoing, Progressing  Goal: Optimal Comfort and Wellbeing  Outcome: Ongoing, Progressing  Goal: Readiness for Transition of Care  Outcome: Ongoing, Progressing  Goal: Rounds/Family Conference  Outcome: Ongoing, Progressing     Problem: Infection  Goal: Infection Symptom Resolution  Outcome: Ongoing, Progressing     Problem: Skin Injury Risk Increased  Goal: Skin Health and Integrity  Outcome: Ongoing, Progressing     Problem: Wound  Goal: Optimal Wound Healing  Outcome: Ongoing, Progressing     Problem: Malnutrition  Goal: Improved Nutritional Intake  Outcome: Ongoing, Progressing     Problem: Parenteral Nutrition  Goal: Effective Intravenous Nutrition Therapy Delivery  Outcome: Ongoing, Progressing

## 2020-03-04 NOTE — PROGRESS NOTES
[facility]  Adult Nutrition   Progress Note (Nutrition Support Management)    SUMMARY     Recommendations  Recommendation/Intervention:   1. RD ordered new TPN to be initiated today @ 1700.   2. Clear liquid diet started, recommend advancement as medically able or transitioning back to NPO (dependent on ostomy output) or as per surgeon recommendations.   3. RD added ensure clear TID (720 kcal/day and 24 g/day protein)   4. NST to monitor and manage TPN daily and advance to meet estimated protein and calorie needs accordingly.   5. Blood glucose and electrolytes to be monitored and managed.     Goals:   1. Patient to tolerate TPN.  2. Patient to tolerate diet. ONS to be started.   3. Patient to meet at least 75% of estimated needs via TPN provision and oral intake.   4. Blood glucose and electrolytes to trend towards normal limits.   5. Transitional feeding from TPN to PO as medically appropriate (plans for TPN to continue for about 6 weeks).       Nutrition Goal Status: progressing towards goal  Communication of RD Recs: reviewed with RN     Dietitian Rounds Brief  Will monitor PO intake, tolerance, and ostomy output. Continue TPN. New TPN ordered.      PARENTERAL NUTRITION PROGRESS NOTE:    Parenteral Nutrition Day # 4  Diagnosis/Indication for PN: high ostomy output, severe malnutrition, expect TPN for 6 weeks   IV Access: Femoral central line   Diet/Tube Feeding: clear liquid         Admixture Type: 3-in-1 (15% AA, 70% dextrose, 20% smoflipid)   Infusion Rate and Frequency: 50 ml/hr, continuous    Patient Acuity: Acute Care      PN Composition:   65 grams Amino Acid, 210 grams Dextrose, and 50 grams Lipid.    Today's TPN provides 1474 kcal.  *Dextrose is started at less than full dextrose goal to reduce risk for Refeeding Syndrome. Dextrose content will be advanced as appropriate over the next few days.     Please see order for electrolytes and additives content and adjustments.   *The Nutrition  "Support Team will continue to monitor electrolytes daily and adjust parenteral nutrition as warranted.    Reason for Assessment  Reason For Assessment: new TPN, RD follow-up  Relevant Medical History: Vulvar cancer, Chronic kidney disease, stage III (moderate), DM2, fatigue, HTN, ileostomy (20), DARCI, Severe dehydration due to chronic diarrhea/high output ileostomy, electrolyte imbalance     Nutrition Risk Screen  Nutrition Risk Screen: tube feeding or parenteral nutrition       Pressure Injury 20 1837 Left Buttocks Stage 2-Wound Image: Images linked       Wound 20 Moisture associated dermatitis medial Perineum #1-Wound Image: Images linked  MST Score: 2  Have you recently lost weight without trying?: Yes: 14-23 lbs  Weight loss score: 2  Have you been eating poorly because of a decreased appetite?: No  Appetite score: 0       Nutrition/Diet History  Patient Reported Diet/Restrictions/Preferences: general  Spiritual, Cultural Beliefs, Christian Practices, Values that Affect Care: no  Food Allergies: NKFA  Factors Affecting Nutritional Intake: malabsorption, clear liquid diet(short bowel syndrome, ostomy output)    Anthropometrics  Temp: 98.2 °F (36.8 °C)  Height Method: Stated  Height: 5' 7" (170.2 cm)  Height (inches): 67 in  Weight Method: Bed Scale  Weight: 53 kg (116 lb 13.5 oz)  Weight (lb): 116.84 lb  Ideal Body Weight (IBW), Female: 135 lb  % Ideal Body Weight, Female (lb): 85.57 %  BMI (Calculated): 18.3  BMI Grade: 17 - 18.4 protein-energy malnutrition grade I  Weight Loss: unintentional  Usual Body Weight (UBW), k.8 kg  Weight Change Amount: 28 lb  % Usual Body Weight: 83.25  % Weight Change From Usual Weight: -16.93 %       Weight History:  Wt Readings from Last 10 Encounters:   20 53 kg (116 lb 13.5 oz)   20 58.7 kg (129 lb 4.8 oz)   20 58.8 kg (129 lb 9.6 oz)   20 56.7 kg (125 lb)   20 56.9 kg (125 lb 6.4 oz)   20 57.2 kg (126 lb)   20 " 61.4 kg (135 lb 6.4 oz)   12/26/19 59 kg (130 lb)   12/11/19 61.8 kg (136 lb 4.8 oz)   11/27/19 63.8 kg (140 lb 10.5 oz)       Lab/Procedures/Meds: Pertinent Labs Reviewed  Clinical Chemistry:  Recent Labs   Lab 03/02/20  0514 03/03/20  0440 03/04/20  0457     139 141  141 141  141   K 4.5  4.5 4.4  4.4 3.8  3.8     106 112*  112* 107  107   CO2 29  29 26  26 28  28   GLU 97  97 107  107 102  102   BUN 39*  39* 35*  35* 32*  32*   CREATININE 3.1*  3.1* 2.7*  2.7* 2.4*  2.4*   CALCIUM 8.1*  8.1* 8.3*  8.3* 8.1*  8.1*   PROT 4.5* 4.6* 4.4*   ALBUMIN 2.5* 2.6* 2.4*   BILITOT 1.5* 1.5* 1.2*   ALKPHOS 303* 241* 228*   AST 31 23 21   ALT 22 16 15   ANIONGAP 4*  4* 3*  3* 6*  6*   ESTGFRAFRICA 16.9*  16.9* 20.0*  20.0* 23.0*  23.0*   EGFRNONAA 14.7*  14.7* 17.3*  17.3* 20.0*  20.0*   MG 1.8 2.2 2.2   PHOS 2.6* 2.5* 2.3*     CBC:   Recent Labs   Lab 03/04/20 0457   WBC 4.46   RBC 2.50*   HGB 7.9*   HCT 25.6*   PLT 95*   *   MCH 31.6*   MCHC 30.9*     Lipid Panel:  Recent Labs   Lab 03/02/20  0514   TRIG 73     Medications: Pertinent Medications reviewed  Scheduled Meds:   cholestyramine-aspartame  1 packet Oral BID    diphenhydrAMINE  6.25 mg Intravenous Q6H    dronabinol  2.5 mg Oral BID AC    levothyroxine  88 mcg Oral QAM    loperamide  4 mg Oral QID    magnesium oxide  400 mg Oral BID    meropenem (MERREM) IVPB  500 mg Intravenous Daily    pantoprazole  40 mg Intravenous BID    potassium chloride 10%  40 mEq Oral BID    psyllium husk (with sugar)  2 packet Oral TID    sertraline  50 mg Oral Daily    vancomycin (VANCOCIN) IVPB  750 mg Intravenous Once     Continuous Infusions:   lactated ringers 125 mL/hr at 03/04/20 0111    octreotide (SANDOSTATIN) infusion 25 mcg/hr (02/29/20 1736)    TPN ADULT CENTRAL LINE CUSTOM (3 in 1) 50 mL/hr at 03/03/20 1740    TPN ADULT CENTRAL LINE CUSTOM (3 in 1)       PRN Meds:.sodium chloride, acetaminophen, calcium  chloride IVPB, calcium chloride IVPB, calcium chloride IVPB, codeine, dextrose 50%, dextrose 50%, glucagon (human recombinant), glucose, glucose, HYDROcodone-acetaminophen, magnesium oxide, magnesium sulfate IVPB, magnesium sulfate IVPB, magnesium sulfate IVPB, magnesium sulfate IVPB, melatonin, morphine, ondansetron, potassium chloride in water, potassium chloride in water, potassium chloride in water, potassium chloride in water, potassium chloride, potassium chloride, potassium chloride, potassium chloride, sodium chloride 0.9%, sodium chloride 0.9%, sodium phosphate IVPB, sodium phosphate IVPB, sodium phosphate IVPB, sodium phosphate IVPB, sodium phosphate IVPB, Pharmacy to dose Vancomycin consult **AND** vancomycin - pharmacy to dose    Estimated/Assessed Needs  Weight Used For Calorie Calculations: 53 kg (116 lb 13.5 oz)  Energy Calorie Requirements (kcal): 1855 - 2120 (35 - 40)   Energy Need Method: Kcal/kg  Protein Requirements: 53 - 106 (1 - 2 g/kg) dependent on renal function  Weight Used For Protein Calculations: 53 kg (116 lb 13.5 oz)  Fluid Requirements (mL): 1590 (30) or to maintain hydration   Estimated Fluid Requirement Method: other (see comments)  RDA Method (mL): 1855       Nutrition Prescription Ordered  Current Diet Order: clear liquid   Current Nutrition Support Formula Ordered: Other (Comment)(custom TPN )  Current Nutrition Support Rate Ordered: 50 (ml)  Oral Nutrition Supplement: -    Evaluation of Received Nutrient/Fluid Intake  Parenteral Calories (kcal): 1347  Parenteral Protein (gm): 51  Parenteral Fluid (mL): 1200  Energy Calories Required: not meeting needs  Protein Required: meeting needs  Fluid Required: meeting needs  Tolerance: tolerating     Intake/Output Summary (Last 24 hours) at 3/4/2020 0847  Last data filed at 3/4/2020 0600  Gross per 24 hour   Intake 2100 ml   Output 5570 ml   Net -3470 ml      Nutrition Risk  Level of Risk/Frequency of Follow-up: high     Monitor and  Evaluation  Food and Nutrient Intake: food and beverage intake, energy intake, parenteral nutrition intake  Food and Nutrient Adminstration: diet order, enteral and parenteral nutrition administration  Physical Activity and Function: nutrition-related ADLs and IADLs, factors affecting access to physical activity  Anthropometric Measurements: weight change, weight, body mass index  Biochemical Data, Medical Tests and Procedures: electrolyte and renal panel, glucose/endocrine profile, lipid profile, gastrointestinal profile, inflammatory profile  Nutrition-Focused Physical Findings: overall appearance, extremities, muscles and bones, head and eyes, skin     Nutrition Follow-Up  RD Follow-up?: Yes     Sandra Lovell RD 03/04/2020 8:57 AM

## 2020-03-04 NOTE — PLAN OF CARE
Problem: Occupational Therapy Goal  Goal: Occupational Therapy Goal  Description  Goals to be met by: discharge    Patient will increase functional independence with ADLs by performing:    Pt to be independent with UE HEP program of 3 x 10 reps each for reconditioning  UE Dressing with Supervision.  LE Dressing with Supervision.  Grooming while standing with Modified [place].  Toileting from toilet with Modified [place] for hygiene and clothing management.   Toilet transfer to toilet with Supervision.      Outcome: Unable to Meet, Plan Revised     Will only perform bed level ADLs and UE Therex until femoral central line is removed.

## 2020-03-05 VITALS
HEART RATE: 61 BPM | WEIGHT: 116.88 LBS | SYSTOLIC BLOOD PRESSURE: 169 MMHG | RESPIRATION RATE: 17 BRPM | HEIGHT: 67 IN | TEMPERATURE: 98 F | DIASTOLIC BLOOD PRESSURE: 74 MMHG | OXYGEN SATURATION: 98 % | BODY MASS INDEX: 18.35 KG/M2

## 2020-03-05 LAB
ALBUMIN SERPL BCP-MCNC: 2.4 G/DL (ref 3.5–5.2)
ALP SERPL-CCNC: 217 U/L (ref 55–135)
ALT SERPL W/O P-5'-P-CCNC: 15 U/L (ref 10–44)
ANION GAP SERPL CALC-SCNC: 2 MMOL/L (ref 8–16)
ANION GAP SERPL CALC-SCNC: 2 MMOL/L (ref 8–16)
AST SERPL-CCNC: 22 U/L (ref 10–40)
BASOPHILS # BLD AUTO: 0.01 K/UL (ref 0–0.2)
BASOPHILS NFR BLD: 0.3 % (ref 0–1.9)
BILIRUB SERPL-MCNC: 1.3 MG/DL (ref 0.1–1)
BUN SERPL-MCNC: 30 MG/DL (ref 8–23)
BUN SERPL-MCNC: 30 MG/DL (ref 8–23)
CALCIUM SERPL-MCNC: 7.9 MG/DL (ref 8.7–10.5)
CALCIUM SERPL-MCNC: 7.9 MG/DL (ref 8.7–10.5)
CHLORIDE SERPL-SCNC: 108 MMOL/L (ref 95–110)
CHLORIDE SERPL-SCNC: 108 MMOL/L (ref 95–110)
CO2 SERPL-SCNC: 31 MMOL/L (ref 23–29)
CO2 SERPL-SCNC: 31 MMOL/L (ref 23–29)
CREAT SERPL-MCNC: 2.3 MG/DL (ref 0.5–1.4)
CREAT SERPL-MCNC: 2.3 MG/DL (ref 0.5–1.4)
DIFFERENTIAL METHOD: ABNORMAL
EOSINOPHIL # BLD AUTO: 0.2 K/UL (ref 0–0.5)
EOSINOPHIL NFR BLD: 6.7 % (ref 0–8)
ERYTHROCYTE [DISTWIDTH] IN BLOOD BY AUTOMATED COUNT: 16 % (ref 11.5–14.5)
EST. GFR  (AFRICAN AMERICAN): 24.3 ML/MIN/1.73 M^2
EST. GFR  (AFRICAN AMERICAN): 24.3 ML/MIN/1.73 M^2
EST. GFR  (NON AFRICAN AMERICAN): 21.1 ML/MIN/1.73 M^2
EST. GFR  (NON AFRICAN AMERICAN): 21.1 ML/MIN/1.73 M^2
GLUCOSE SERPL-MCNC: 106 MG/DL (ref 70–110)
GLUCOSE SERPL-MCNC: 106 MG/DL (ref 70–110)
HCT VFR BLD AUTO: 24.5 % (ref 37–48.5)
HGB BLD-MCNC: 7.5 G/DL (ref 12–16)
IMM GRANULOCYTES # BLD AUTO: 0.02 K/UL (ref 0–0.04)
IMM GRANULOCYTES NFR BLD AUTO: 0.6 % (ref 0–0.5)
LYMPHOCYTES # BLD AUTO: 0.8 K/UL (ref 1–4.8)
LYMPHOCYTES NFR BLD: 20.8 % (ref 18–48)
MAGNESIUM SERPL-MCNC: 2.2 MG/DL (ref 1.6–2.6)
MCH RBC QN AUTO: 31.3 PG (ref 27–31)
MCHC RBC AUTO-ENTMCNC: 30.6 G/DL (ref 32–36)
MCV RBC AUTO: 102 FL (ref 82–98)
MONOCYTES # BLD AUTO: 0.4 K/UL (ref 0.3–1)
MONOCYTES NFR BLD: 11.7 % (ref 4–15)
NEUTROPHILS # BLD AUTO: 2.2 K/UL (ref 1.8–7.7)
NEUTROPHILS NFR BLD: 59.9 % (ref 38–73)
NRBC BLD-RTO: 0 /100 WBC
PHOSPHATE SERPL-MCNC: 2.2 MG/DL (ref 2.7–4.5)
PLATELET # BLD AUTO: 87 K/UL (ref 150–350)
PMV BLD AUTO: 12.5 FL (ref 9.2–12.9)
POTASSIUM SERPL-SCNC: 3.9 MMOL/L (ref 3.5–5.1)
POTASSIUM SERPL-SCNC: 3.9 MMOL/L (ref 3.5–5.1)
PROT SERPL-MCNC: 4.3 G/DL (ref 6–8.4)
RBC # BLD AUTO: 2.4 M/UL (ref 4–5.4)
SODIUM SERPL-SCNC: 141 MMOL/L (ref 136–145)
SODIUM SERPL-SCNC: 141 MMOL/L (ref 136–145)
WBC # BLD AUTO: 3.6 K/UL (ref 3.9–12.7)

## 2020-03-05 PROCEDURE — 63600175 PHARM REV CODE 636 W HCPCS: Performed by: INTERNAL MEDICINE

## 2020-03-05 PROCEDURE — B4185 PARENTERAL SOL 10 GM LIPIDS: HCPCS | Performed by: INTERNAL MEDICINE

## 2020-03-05 PROCEDURE — 25000003 PHARM REV CODE 250: Performed by: INTERNAL MEDICINE

## 2020-03-05 PROCEDURE — 12000002 HC ACUTE/MED SURGE SEMI-PRIVATE ROOM

## 2020-03-05 PROCEDURE — 83735 ASSAY OF MAGNESIUM: CPT

## 2020-03-05 PROCEDURE — 85025 COMPLETE CBC W/AUTO DIFF WBC: CPT

## 2020-03-05 PROCEDURE — 25000003 PHARM REV CODE 250: Performed by: SURGERY

## 2020-03-05 PROCEDURE — A4217 STERILE WATER/SALINE, 500 ML: HCPCS | Performed by: INTERNAL MEDICINE

## 2020-03-05 PROCEDURE — C9113 INJ PANTOPRAZOLE SODIUM, VIA: HCPCS | Performed by: SURGERY

## 2020-03-05 PROCEDURE — 84100 ASSAY OF PHOSPHORUS: CPT

## 2020-03-05 PROCEDURE — 97110 THERAPEUTIC EXERCISES: CPT

## 2020-03-05 PROCEDURE — 63600175 PHARM REV CODE 636 W HCPCS: Performed by: SURGERY

## 2020-03-05 PROCEDURE — 80053 COMPREHEN METABOLIC PANEL: CPT

## 2020-03-05 RX ORDER — LOPERAMIDE HYDROCHLORIDE 2 MG/1
4 CAPSULE ORAL 4 TIMES DAILY PRN
Qty: 28 CAPSULE | Refills: 0 | Status: SHIPPED | OUTPATIENT
Start: 2020-03-05 | End: 2020-03-15

## 2020-03-05 RX ORDER — LEVOTHYROXINE SODIUM 88 UG/1
88 TABLET ORAL EVERY MORNING
Qty: 30 TABLET | Refills: 11 | Status: ON HOLD | OUTPATIENT
Start: 2020-03-06 | End: 2020-03-06 | Stop reason: HOSPADM

## 2020-03-05 RX ORDER — CHOLESTYRAMINE 4 G/4.8G
1 POWDER, FOR SUSPENSION ORAL 2 TIMES DAILY
Qty: 180 PACKET | Refills: 3 | Status: ON HOLD | OUTPATIENT
Start: 2020-03-05 | End: 2020-03-06 | Stop reason: HOSPADM

## 2020-03-05 RX ADMIN — MORPHINE SULFATE 2 MG: 2 INJECTION, SOLUTION INTRAMUSCULAR; INTRAVENOUS at 05:03

## 2020-03-05 RX ADMIN — MAGNESIUM OXIDE 400 MG: 400 TABLET ORAL at 09:03

## 2020-03-05 RX ADMIN — MEROPENEM 500 MG: 500 INJECTION, POWDER, FOR SOLUTION INTRAVENOUS at 09:03

## 2020-03-05 RX ADMIN — DIPHENHYDRAMINE HYDROCHLORIDE 6.25 MG: 50 INJECTION, SOLUTION INTRAMUSCULAR; INTRAVENOUS at 05:03

## 2020-03-05 RX ADMIN — DRONABINOL 2.5 MG: 2.5 CAPSULE ORAL at 03:03

## 2020-03-05 RX ADMIN — LOPERAMIDE HYDROCHLORIDE 4 MG: 2 CAPSULE ORAL at 08:03

## 2020-03-05 RX ADMIN — LOPERAMIDE HYDROCHLORIDE 4 MG: 2 CAPSULE ORAL at 12:03

## 2020-03-05 RX ADMIN — PANTOPRAZOLE SODIUM 40 MG: 40 INJECTION, POWDER, LYOPHILIZED, FOR SOLUTION INTRAVENOUS at 08:03

## 2020-03-05 RX ADMIN — LOPERAMIDE HYDROCHLORIDE 4 MG: 2 CAPSULE ORAL at 05:03

## 2020-03-05 RX ADMIN — MAGNESIUM OXIDE 400 MG: 400 TABLET ORAL at 08:03

## 2020-03-05 RX ADMIN — PANTOPRAZOLE SODIUM 40 MG: 40 INJECTION, POWDER, LYOPHILIZED, FOR SOLUTION INTRAVENOUS at 09:03

## 2020-03-05 RX ADMIN — PSYLLIUM HUSK 2 PACKET: 3.4 POWDER ORAL at 03:03

## 2020-03-05 RX ADMIN — HYDROCODONE BITARTRATE AND ACETAMINOPHEN 1 TABLET: 5; 325 TABLET ORAL at 11:03

## 2020-03-05 RX ADMIN — MORPHINE SULFATE 2 MG: 2 INJECTION, SOLUTION INTRAMUSCULAR; INTRAVENOUS at 12:03

## 2020-03-05 RX ADMIN — SMOFLIPID: 6; 6; 5; 3 INJECTION, EMULSION INTRAVENOUS at 05:03

## 2020-03-05 RX ADMIN — DRONABINOL 2.5 MG: 2.5 CAPSULE ORAL at 05:03

## 2020-03-05 RX ADMIN — SODIUM CHLORIDE, POTASSIUM CHLORIDE, SODIUM LACTATE AND CALCIUM CHLORIDE: 600; 310; 30; 20 INJECTION, SOLUTION INTRAVENOUS at 09:03

## 2020-03-05 RX ADMIN — CHOLESTYRAMINE 4 G: 4 POWDER, FOR SUSPENSION ORAL at 08:03

## 2020-03-05 RX ADMIN — PSYLLIUM HUSK 2 PACKET: 3.4 POWDER ORAL at 08:03

## 2020-03-05 RX ADMIN — DIPHENHYDRAMINE HYDROCHLORIDE 6.25 MG: 50 INJECTION, SOLUTION INTRAMUSCULAR; INTRAVENOUS at 12:03

## 2020-03-05 RX ADMIN — SODIUM CHLORIDE, POTASSIUM CHLORIDE, SODIUM LACTATE AND CALCIUM CHLORIDE: 600; 310; 30; 20 INJECTION, SOLUTION INTRAVENOUS at 12:03

## 2020-03-05 RX ADMIN — CHOLESTYRAMINE 4 G: 4 POWDER, FOR SUSPENSION ORAL at 09:03

## 2020-03-05 RX ADMIN — PSYLLIUM HUSK 2 PACKET: 3.4 POWDER ORAL at 09:03

## 2020-03-05 RX ADMIN — LEVOTHYROXINE SODIUM 88 MCG: 88 TABLET ORAL at 05:03

## 2020-03-05 RX ADMIN — HYDROCODONE BITARTRATE AND ACETAMINOPHEN 1 TABLET: 5; 325 TABLET ORAL at 12:03

## 2020-03-05 RX ADMIN — SERTRALINE HYDROCHLORIDE 50 MG: 50 TABLET ORAL at 09:03

## 2020-03-05 RX ADMIN — LOPERAMIDE HYDROCHLORIDE 4 MG: 2 CAPSULE ORAL at 09:03

## 2020-03-05 NOTE — PROGRESS NOTES
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  Patient reported weight loss of 20 lb in 1 week, since discharge from the hospital  Patient admitted to ICU, currently hypotensive but not on vasopressor.  Awake alert and oriented.  Patient requested a DNR status while in house.  General surgery, nephrology, Gastroenterology, dietitian and wound care consulted    3/3: I have consulted IR for central line placement. Pt has a right femoral line in placed for TPN.  3/4: Pt is feeling better but we are unable to obtain long term venous access. IR and anesthesia are not options here. Plan: called transfer center line and will attempt to transfer to [facility] or [facility]    3/5: We are awaiting word from the transfer center re referral for venous access.  Pt refuses to take K+ liquid and the pills will not be absorbed. Her K+ is 3.9. Pt has a stage 2 decubitus on her sacrum treated with turning, partitioning and local care  Nephrology consult appreciated.    Interval History: We will attempt to sent the patient to a higher [facility] of interventional radiology care for venous access    Review of Systems   Constitutional: Positive for fatigue. Negative for chills and fever.   HENT: Negative.    Eyes: Negative.    Respiratory: Negative.    Cardiovascular: Negative.    Gastrointestinal:        Ileostomy    Genitourinary: Negative.    Musculoskeletal: Negative.    Neurological: Negative.    Psychiatric/Behavioral: Positive for dysphoric mood.     Objective:     Vital Signs (Most Recent):  Temp: 98 °F (36.7 °C) (03/04/20 1108)  Pulse: 65 (03/04/20 1108)  Resp: 17 (03/04/20 1108)  BP: 128/67 (03/04/20 1108)  SpO2: 98 % (03/04/20 1108) Vital Signs (24h Range):  Temp:  [98 °F (36.7 °C)-98.7 °F (37.1 °C)] 98 °F (36.7 °C)  Pulse:  [56-78] 65  Resp:  [16-18] 17  SpO2:  [97 %-99 %] 98 %  BP: ()/(56-68) 128/67     Weight: 53 kg (116 lb 13.5 oz)  Body mass index is 18.3 kg/m².    Intake/Output Summary (Last 24 hours) at 3/4/2020 1512  Last data filed at 3/4/2020 1200  Gross per 24 hour   Intake 3700 ml   Output 4220 ml   Net -520 ml      Physical Exam   Constitutional: No distress.   HENT:   Head: Normocephalic and atraumatic.   Eyes: Pupils are equal, round, and reactive to light. EOM are normal.   Neck: Normal range of motion. Neck supple.   Cardiovascular: Normal rate and regular rhythm.   Pulmonary/Chest: Effort normal and breath sounds normal.   Abdominal: Soft. She exhibits distension.   Musculoskeletal: Normal range of motion.   Skin: She is not diaphoretic.   Psychiatric: She has a normal mood and affect.       Significant Labs:   BMP:   Recent Labs   Lab 03/04/20 0457     102     141   K 3.8  3.8     107   CO2 28  28   BUN 32*  32*   CREATININE 2.4*  2.4*   CALCIUM 8.1*  8.1*   MG 2.2     CBC:   Recent Labs   Lab 03/03/20 0440 03/04/20 0457   WBC 4.30 4.46   HGB 8.5* 7.9*   HCT 27.6* 25.6*   * 95*     CMP:   Recent Labs   Lab 03/03/20 0440 03/04/20 0457     141 141  141   K 4.4  4.4 3.8  3.8   *  112* 107  107   CO2 26  26 28  28     107 102  102   BUN 35*  35* 32*  32*   CREATININE 2.7*  2.7* 2.4*  2.4*   CALCIUM 8.3*  8.3* 8.1*  8.1*   PROT 4.6* 4.4*   ALBUMIN 2.6* 2.4*   BILITOT 1.5* 1.2*   ALKPHOS 241* 228*   AST 23 21   ALT 16 15    ANIONGAP 3*  3* 6*  6*   EGFRNONAA 17.3*  17.3* 20.0*  20.0*     Cardiac Markers: No results for input(s): CKMB, MYOGLOBIN, BNP, TROPISTAT in the last 48 hours.  Magnesium:   Recent Labs   Lab 03/03/20  0440 03/04/20  0457   MG 2.2 2.2       Significant Imaging: I have reviewed all pertinent imaging results/findings within the past 24 hours.      Assessment/Plan:      No notes have been filed under this hospital service.  Service: Hospital Medicine    VTE Risk Mitigation (From admission, onward)         Ordered     IP VTE HIGH RISK PATIENT  Once      02/23/20 2025     Reason for No Pharmacological VTE Prophylaxis  Once     Question:  Reasons:  Answer:  Risk of Bleeding    02/23/20 2025                      Ashwin Diaz MD  Department of Hospital Medicine   Critical access hospital

## 2020-03-05 NOTE — PT/OT/SLP PROGRESS
Occupational Therapy   Treatment    Name: Shara Hutchins  MRN: [id number]  Admitting Diagnosis:  Severe dehydration  6 Days Post-Op    Recommendations:     Discharge Recommendations: home health OT  Discharge Equipment Recommendations:  none  Barriers to discharge:  None    Assessment:     Shara Hutchins is a 69 y.o. female with a medical diagnosis of Severe dehydration.  She presents with improving medical acuity, but unable to participate in OOB activity secondary to femoral central line catheter. Performance deficits affecting function are weakness, impaired endurance, impaired functional mobilty.     Rehab Prognosis:  Fair; patient would benefit from acute skilled OT services to address these deficits and reach maximum level of function.       Plan:     Patient to be seen 3 x/week to address the above listed problems via therapeutic exercises, therapeutic activities, self-care/home management  · Plan of Care Expires: 03/25/20  · Plan of Care Reviewed with: patient    Subjective     Pain/Comfort:  · Pain Rating 1: 0/10  · Pain Rating Post-Intervention 1: 0/10    Objective:     Communicated with: nurse prior to session.  Patient found supine with blood pressure cuff, central line, telemetry, lee catheter(ileostomy) upon OT entry to room.    General Precautions: Standard, fall   Orthopedic Precautions:N/A   Braces:       Occupational Performance:     BUE TherEx:    · Performed 20 reps of BUE TherEx using yellow theraband to strengthen shoulders, biceps, and triceps with minimal assistance.      [facility] 6 Click ADL:      Treatment & Education:  Demonstrated BUE TherEx for shoulder, biceps, and triceps to strengthen muscles needed to still be able to perform ADLs while on bed rest    Patient left supine with all lines intact and call button in reachEducation:      GOALS:   Multidisciplinary Problems     Occupational Therapy Goals        Problem: Occupational Therapy Goal    Goal Priority Disciplines Outcome Interventions    Occupational Therapy Goal     OT, PT/OT Ongoing, Progressing    Description:  Goals to be met by: discharge    Patient will increase functional independence with ADLs by performing:    Pt to be independent with UE HEP program of 3 x 10 reps each for reconditioning  UE Dressing with Supervision.  LE Dressing with Supervision.  Grooming while standing with Modified [place].  Toileting from toilet with Modified [place] for hygiene and clothing management.   Toilet transfer to toilet with Supervision.                       Time Tracking:     OT Date of Treatment: 03/05/20  OT Start Time: 0958  OT Stop Time: 1010  OT Total Time (min): 12 min    Billable Minutes:Therapeutic Exercise 12    ADRIAN Skelton  3/5/2020

## 2020-03-05 NOTE — PLAN OF CARE
"Outside Transfer Acceptance Note        Patients name/MRN:     Shara Hutchins MRN: [id number]     Referring Physician or Mid-Level provider giving report:   Ashwin Diaz MD     Referral Facility:    [facility], Med/Surg     Date/Time of Acceptance:    3/5/20 at 5:40pm     Accepting Physician for admission to hospital: Anthony Choi MD ()     Accepting facility:    [facility]/Surg     Consulting Physicians from [facility] involved in case:   Reginald Eason MD    Reason for transfer request:    IR    68yo woman with HTN, hypothyroid, anemia, hyper-parathyroid, CKD3, DM2, and vulvar cancer s/p chemo/XRT complicated by chronic diarrhea and pneumatosis intestinalis now s/p ileostomy presented with high output ileostomy and DARCI 2/2 fluid loss on 2/23/20. Has lost 20lbs in this time. Was placed on TPN which she has been receiving through right femoral line, but needs better access. Unfortunately, she has severely stenosed veins, and line was unable to be placed by Gen Surg or Anesthesia at referring hospital. IR at that facility did not feel confident that they would be able to place line either and requested transfer to higher volume center. She can return to sending facility after line is placed.    Other relevant medical problems include UTI for which she has been treated with meropenem (cultures negative, and could likely consider discontinuing or de-escalating therapy). DARCI has been progressively improving with IVF and TPN. She has been unable to tolerate an oral medications.    To Do List upon arrival:    1) Consult IR for tunneled catheter  2) Transfer back to [facility] following procedure    Vital signs:   /63 (BP Location: Right arm, Patient Position: Sitting)   Pulse 60   Temp 97.7 °F (36.5 °C) (Oral)   Resp 17   Ht 5' 7" (1.702 m)   Wt 53 kg (116 lb 13.5 oz)   SpO2 98%   Breastfeeding? No   BMI 18.30 kg/m²     Past Medical History:   Diagnosis Date    Chronic " kidney disease, stage III (moderate) 1/7/2018    Diabetes mellitus, type 2     Fatigue     Hypertension     Iron deficiency anemia due to chronic blood loss 1/7/2018    Vulvar cancer 1/7/2019     Past Surgical History:   Procedure Laterality Date    ABDOMINAL SURGERY      BACK SURGERY      CERVICAL FUSION      x 4    cervix repair      CHOLECYSTECTOMY  2000    DIAGNOSTIC LAPAROSCOPY N/A 2/13/2020    Procedure: LAPAROSCOPY, DIAGNOSTIC;  Surgeon: Robbi Lovell III, MD;  Location: [facility] OR;  Service: General;  Laterality: N/A;    HYSTERECTOMY  1980    ILEOSTOMY N/A 2/13/2020    Procedure: CREATION, ILEOSTOMY Loop;  Surgeon: Robbi Lovell III, MD;  Location: [facility] OR;  Service: General;  Laterality: N/A;    LUMBAR LAMINECTOMY      LYSIS OF ADHESIONS N/A 2/13/2020    Procedure: LYSIS, ADHESIONS;  Surgeon: Robbi Lovell III, MD;  Location: [facility] OR;  Service: General;  Laterality: N/A;    NECK SURGERY      PHLEBOGRAPHY  2/28/2020    Procedure: VENOGRAM;  Surgeon: Robbi Lovell III, MD;  Location: [facility] OR;  Service: General;;    REMOVAL OF VASCULAR ACCESS PORT Right 2/13/2020    Procedure: REMOVAL, VASCULAR ACCESS PORT;  Surgeon: Robbi Lovell III, MD;  Location: [facility] OR;  Service: General;  Laterality: Right;    SHOULDER SURGERY      vulva cancer       Allergies:   Review of patient's allergies indicates:   Allergen Reactions    Ciprofloxacin Other (See Comments)     Elevated liver enzymes      Pcn [penicillins] Anaphylaxis     Current Facility-Administered Medications:   Current Facility-Administered Medications:     0.9%  NaCl infusion (for blood administration), , Intravenous, Q24H PRN, Robbi Lovell III, MD    acetaminophen tablet 650 mg, 650 mg, Oral, Q4H PRN, Robbi Lovell III, MD    calcium chloride 1g in sodium chloride 0.9% 100mL (ready to mix system), 1 g, Intravenous, PRN, Robbi Lovell III, MD    calcium chloride 1g in sodium chloride 0.9% 100mL  (ready to mix system), 1 g, Intravenous, PRN, Robbi Lovell III, MD    calcium chloride 1g in sodium chloride 0.9% 100mL (ready to mix system), 1 g, Intravenous, PRN, Robbi Lovell III, MD    cholestyramine-aspartame 4 gram packet 4 g, 1 packet, Oral, BID, Robbi Lovell III, MD, 4 g at 03/05/20 0915    codeine tablet 30 mg, 30 mg, Oral, Q6H PRN, Robbi Lovell III, MD    dextrose 50% injection 12.5 g, 12.5 g, Intravenous, PRN, Robbi Lovell III, MD    dextrose 50% injection 25 g, 25 g, Intravenous, PRN, Robbi Lovell III, MD    diphenhydrAMINE injection 6.25 mg, 6.25 mg, Intravenous, Q6H, Aleksander Phan MD, 6.25 mg at 03/05/20 1242    dronabinol capsule 2.5 mg, 2.5 mg, Oral, BID AC, Robbi Lovell III, MD, 2.5 mg at 03/05/20 1539    glucagon (human recombinant) injection 1 mg, 1 mg, Intramuscular, PRN, Robbi Lovell III, MD    glucose chewable tablet 16 g, 16 g, Oral, PRN, Robbi Lovell III, MD    glucose chewable tablet 24 g, 24 g, Oral, PRN, Robbi Lovell III, MD    HYDROcodone-acetaminophen 5-325 mg per tablet 1 tablet, 1 tablet, Oral, Q6H PRN, Robbi Lovell III, MD, 1 tablet at 03/05/20 0013    lactated ringers infusion, , Intravenous, Continuous, Aleksander Phan MD, Last Rate: 125 mL/hr at 03/05/20 0900    levothyroxine tablet 88 mcg, 88 mcg, Oral, QAM, Robbi Lovell III, MD, 88 mcg at 03/05/20 0502    loperamide capsule 4 mg, 4 mg, Oral, QID, Robbi Lovell III, MD, 4 mg at 03/05/20 1242    magnesium oxide tablet 400 mg, 400 mg, Oral, BID, Robbi Lovell III, MD, 400 mg at 03/05/20 0916    magnesium oxide tablet 800 mg, 800 mg, Oral, PRN, Robbi Lovell III, MD, 800 mg at 02/28/20 0614    magnesium sulfate 2g in water 50mL IVPB (premix), 2 g, Intravenous, PRN, Robbi Lovell III, MD    magnesium sulfate 2g in water 50mL IVPB (premix), 4 g, Intravenous, PRN, Robbi Lovell III, MD    magnesium sulfate 2g in water 50mL  IVPB (premix), 2 g, Intravenous, PRN, Robbi Lovell III, MD, 2 g at 02/28/20 2346    magnesium sulfate in dextrose IVPB (premix) 1 g, 1 g, Intravenous, PRN, Robbi Lovell III, MD, Stopped at 02/29/20 0146    melatonin tablet 9 mg, 9 mg, Oral, Nightly PRN, Robbi Lovell III, MD    meropenem 500 mg in sodium chloride 0.9 % 100 mL IVPB (ready to mix system), 500 mg, Intravenous, Daily, Robbi Lovell III, MD, Last Rate: 33.3 mL/hr at 03/05/20 0900, 500 mg at 03/05/20 0900    morphine injection 2 mg, 2 mg, Intravenous, Q4H PRN, Robbi Lovell III, MD, 2 mg at 03/05/20 1242    octreotide (SANDOSTATIN) 500 mcg in sodium chloride 0.9% 100 mL infusion, 25 mcg/hr, Intravenous, Continuous, Robbi Lovell III, MD, Last Rate: 5 mL/hr at 02/29/20 1736, 25 mcg/hr at 02/29/20 1736    ondansetron disintegrating tablet 8 mg, 8 mg, Oral, Q8H PRN, Robbi Lovell III, MD, 8 mg at 02/27/20 1159    pantoprazole injection 40 mg, 40 mg, Intravenous, BID, Robbi Lovell III, MD, 40 mg at 03/05/20 0916    potassium chloride 10 mEq in 100 mL IVPB, 20 mEq, Intravenous, PRN, Robbi Lovell III, MD    potassium chloride 10 mEq in 100 mL IVPB, 40 mEq, Intravenous, PRN, Robbi Lovell III, MD    potassium chloride 10 mEq in 100 mL IVPB, 20 mEq, Intravenous, PRN, Robbi Lovell III, MD    potassium chloride 10 mEq in 100 mL IVPB, 40 mEq, Intravenous, PRN, Robbi Lovell III, MD    potassium chloride 10% oral solution 40 mEq, 40 mEq, Oral, BID, Robbi Lovell III, MD, 40 mEq at 03/04/20 2034    potassium chloride SA CR tablet 20 mEq, 20 mEq, Oral, PRN, Robbi Lovell III, MD    potassium chloride SA CR tablet 20 mEq, 20 mEq, Oral, PRN, Robbi Lovell III, MD    potassium chloride SA CR tablet 40 mEq, 40 mEq, Oral, PRN, Robbi Lovell III, MD    potassium chloride SA CR tablet 40 mEq, 40 mEq, Oral, PRN, Robbi Lovell III, MD    psyllium husk (with sugar) 3.4 gram  packet 2 packet, 2 packet, Oral, TID, Robbi Lovell III, MD, 2 packet at 03/05/20 1539    sertraline tablet 50 mg, 50 mg, Oral, Daily, Robbi Lovell III, MD, 50 mg at 03/05/20 0916    sodium chloride 0.9% flush 10 mL, 10 mL, Intravenous, Q6H PRN, Robbi Lovell III, MD    sodium chloride 0.9% flush 10 mL, 10 mL, Intravenous, PRN, Robbi Lovell III, MD    sodium phosphate 15 mmol in dextrose 5 % 250 mL IVPB, 15 mmol, Intravenous, PRN, Robbi Lovell III, MD    sodium phosphate 15 mmol in dextrose 5 % 250 mL IVPB, 15 mmol, Intravenous, PRN, Robbi Lovell III, MD    sodium phosphate 20.01 mmol in dextrose 5 % 250 mL IVPB, 20.01 mmol, Intravenous, PRN, Robbi Lvoell III, MD    sodium phosphate 20.01 mmol in dextrose 5 % 250 mL IVPB, 20.01 mmol, Intravenous, PRN, Robbi Lovell III, MD    sodium phosphate 30 mmol in dextrose 5 % 250 mL IVPB, 30 mmol, Intravenous, PRN, Robbi Lovell III, MD    TPN ADULT CENTRAL LINE CUSTOM (3 in 1), , Intravenous, Continuous, Aleksander Phan MD    Pharmacy to dose Vancomycin consult, , , Once **AND** vancomycin - pharmacy to dose, , Intravenous, pharmacy to manage frequency, Robbi Lovell III, MD    LABS: per [facility]    Imaging: per Epic      Anthony Choi MD  Department of Hospital Medicine  Patient Flow Center/   [phone]

## 2020-03-05 NOTE — PLAN OF CARE
Problem: Malnutrition  Goal: Improved Nutritional Intake  Outcome: Ongoing, Progressing  Intervention: Promote and Optimize Nutrition Support  Flowsheets (Taken 3/5/2020 1047)  Nutrition Support Management: parenteral nutrition rate increased; parenteral nutrition composition adjusted     Problem: Parenteral Nutrition  Goal: Effective Intravenous Nutrition Therapy Delivery  Outcome: Ongoing, Progressing  Intervention: Optimize Intravenous Nutrition Delivery  Flowsheets (Taken 3/5/2020 1047)  Nutrition Support Management: parenteral nutrition rate increased; parenteral nutrition composition adjusted

## 2020-03-05 NOTE — PROGRESS NOTES
[facility]  Adult Nutrition   Progress Note (Nutrition Support Management)    SUMMARY     Recommendations  Recommendation/Intervention:    1. RD ordered new TPN to be initiated today @ 1700. New rate of 55 ml/hr.   2. Recommend transitioning back to NPO if surgeon agrees, high output.   3. Recommend changing electrolyte correction from PO to IV administration to improve absorption. Blood glucose and electrolytes to be monitored and managed accordingly.  4. NST to monitor and manage TPN daily and advance to meet estimated protein and calorie needs accordingly.     Goals:   1. Patient to tolerate TPN.   2. Diet per surgeon recommendations.   3. Patient to meet at least 75% of estimated needs via TPN provision.   4. Blood glucose and electrolytes to trend towards normal limits.   5. Transitional feeding from TPN to PO as medically appropriate (plans for TPN to continue for about 6 weeks).    Nutrition Goal Status: new  Communication of RD Recs: reviewed with RN    Dietitian Rounds Brief  Recommend NPO status if MD agrees, patient still having high ostomy output. Recommend changing electrolyte correction to IV administration rather than PO for better absorption. TPN to continue. New TPN ordered.     PARENTERAL NUTRITION PROGRESS NOTE:    Parenteral Nutrition Day # 5  Diagnosis/Indication for PN: high ostomy output, severe malnutrition, expect TPN for 6 weeks   IV Access: Femoral central line   Diet/Tube Feeding: clear liquid         Admixture Type: 3-in-1 (15% AA, 70% dextrose, 20% smoflipid)   Infusion Rate and Frequency: 55 ml/hr, continuous    Patient Acuity: Acute Care      PN Composition:   80 grams Amino Acid, 266 grams Dextrose, and 50 grams Lipid.    Today's TPN provides 1724 kcal.  *Dextrose is started at less than full dextrose goal to reduce risk for Refeeding Syndrome. Dextrose content will be advanced as appropriate over the next few days.     Please see order for electrolytes and additives  "content and adjustments.   *The Nutrition Support Team will continue to monitor electrolytes daily and adjust parenteral nutrition as warranted.    Reason for Assessment  Reason For Assessment: new TPN, RD follow-up  Relevant Medical History: Vulvar cancer, Chronic kidney disease, stage III (moderate), DM2, fatigue, HTN, ileostomy (20), DARCI, Severe dehydration due to chronic diarrhea/high output ileostomy, electrolyte imbalance     Nutrition Risk Screen  Nutrition Risk Screen: tube feeding or parenteral nutrition       Pressure Injury 20 1837 Left Buttocks Stage 2-Wound Image: Images linked       Wound 20 Moisture associated dermatitis medial Perineum #1-Wound Image: Images linked  MST Score: 2  Have you recently lost weight without trying?: Yes: 14-23 lbs  Weight loss score: 2  Have you been eating poorly because of a decreased appetite?: No  Appetite score: 0       Nutrition/Diet History  Patient Reported Diet/Restrictions/Preferences: general  Spiritual, Cultural Beliefs, Yazidism Practices, Values that Affect Care: no  Food Allergies: NKFA  Factors Affecting Nutritional Intake: malabsorption, clear liquid diet(short bowel syndrome, ostomy output)    Anthropometrics  Temp: 98.3 °F (36.8 °C)  Height Method: Stated  Height: 5' 7" (170.2 cm)  Height (inches): 67 in  Weight Method: Bed Scale  Weight: 53 kg (116 lb 13.5 oz)  Weight (lb): 116.84 lb  Ideal Body Weight (IBW), Female: 135 lb  % Ideal Body Weight, Female (lb): 85.57 %  BMI (Calculated): 18.3  BMI Grade: 17 - 18.4 protein-energy malnutrition grade I  Weight Loss: unintentional  Usual Body Weight (UBW), k.8 kg  Weight Change Amount: 28 lb  % Usual Body Weight: 83.25  % Weight Change From Usual Weight: -16.93 %       Weight History:  Wt Readings from Last 10 Encounters:   20 53 kg (116 lb 13.5 oz)   20 58.7 kg (129 lb 4.8 oz)   20 58.8 kg (129 lb 9.6 oz)   20 56.7 kg (125 lb)   20 56.9 kg (125 lb 6.4 " oz)   01/22/20 57.2 kg (126 lb)   01/08/20 61.4 kg (135 lb 6.4 oz)   12/26/19 59 kg (130 lb)   12/11/19 61.8 kg (136 lb 4.8 oz)   11/27/19 63.8 kg (140 lb 10.5 oz)       Lab/Procedures/Meds: Pertinent Labs Reviewed  Clinical Chemistry:  Recent Labs   Lab 03/03/20  0440 03/04/20  0457 03/05/20  0454     141 141  141 141  141   K 4.4  4.4 3.8  3.8 3.9  3.9   *  112* 107  107 108  108   CO2 26  26 28  28 31*  31*     107 102  102 106  106   BUN 35*  35* 32*  32* 30*  30*   CREATININE 2.7*  2.7* 2.4*  2.4* 2.3*  2.3*   CALCIUM 8.3*  8.3* 8.1*  8.1* 7.9*  7.9*   PROT 4.6* 4.4* 4.3*   ALBUMIN 2.6* 2.4* 2.4*   BILITOT 1.5* 1.2* 1.3*   ALKPHOS 241* 228* 217*   AST 23 21 22   ALT 16 15 15   ANIONGAP 3*  3* 6*  6* 2*  2*   ESTGFRAFRICA 20.0*  20.0* 23.0*  23.0* 24.3*  24.3*   EGFRNONAA 17.3*  17.3* 20.0*  20.0* 21.1*  21.1*   MG 2.2 2.2 2.2   PHOS 2.5* 2.3* 2.2*     CBC:   Recent Labs   Lab 03/05/20  0454   WBC 3.60*   RBC 2.40*   HGB 7.5*   HCT 24.5*   PLT 87*   *   MCH 31.3*   MCHC 30.6*     Lipid Panel:  Recent Labs   Lab 03/02/20  0514   TRIG 73     Medications: Pertinent Medications reviewed  Scheduled Meds:   cholestyramine-aspartame  1 packet Oral BID    diphenhydrAMINE  6.25 mg Intravenous Q6H    dronabinol  2.5 mg Oral BID AC    levothyroxine  88 mcg Oral QAM    loperamide  4 mg Oral QID    magnesium oxide  400 mg Oral BID    meropenem (MERREM) IVPB  500 mg Intravenous Daily    pantoprazole  40 mg Intravenous BID    potassium chloride 10%  40 mEq Oral BID    psyllium husk (with sugar)  2 packet Oral TID    sertraline  50 mg Oral Daily     Continuous Infusions:   lactated ringers 125 mL/hr at 03/05/20 0013    octreotide (SANDOSTATIN) infusion 25 mcg/hr (02/29/20 1736)    TPN ADULT CENTRAL LINE CUSTOM (3 in 1) 50 mL/hr at 03/04/20 1742    TPN ADULT CENTRAL LINE CUSTOM (3 in 1)       PRN Meds:.sodium chloride, acetaminophen, calcium chloride IVPB,  calcium chloride IVPB, calcium chloride IVPB, codeine, dextrose 50%, dextrose 50%, glucagon (human recombinant), glucose, glucose, HYDROcodone-acetaminophen, magnesium oxide, magnesium sulfate IVPB, magnesium sulfate IVPB, magnesium sulfate IVPB, magnesium sulfate IVPB, melatonin, morphine, ondansetron, potassium chloride in water, potassium chloride in water, potassium chloride in water, potassium chloride in water, potassium chloride, potassium chloride, potassium chloride, potassium chloride, sodium chloride 0.9%, sodium chloride 0.9%, sodium phosphate IVPB, sodium phosphate IVPB, sodium phosphate IVPB, sodium phosphate IVPB, sodium phosphate IVPB, Pharmacy to dose Vancomycin consult **AND** vancomycin - pharmacy to dose    Estimated/Assessed Needs  Weight Used For Calorie Calculations: 53 kg (116 lb 13.5 oz)  Energy Calorie Requirements (kcal): 1855 - 2120 (35 - 40)   Energy Need Method: Kcal/kg  Protein Requirements: 53 - 106 (1 - 2 g/kg) dependent on renal function  Weight Used For Protein Calculations: 53 kg (116 lb 13.5 oz)  Fluid Requirements (mL): 1590 (30) or to maintain hydration   Estimated Fluid Requirement Method: other (see comments)  RDA Method (mL): 1855       Nutrition Prescription Ordered  Current Diet Order: clear liquid   Current Nutrition Support Formula Ordered: Other (Comment)(custom TPN )  Current Nutrition Support Rate Ordered: 50 (ml)  Oral Nutrition Supplement: -    Evaluation of Received Nutrient/Fluid Intake  Parenteral Calories (kcal): 1474  Parenteral Protein (gm): 51  Parenteral Fluid (mL): 1200  Energy Calories Required: not meeting needs  Protein Required: meeting needs  Fluid Required: meeting needs  Tolerance: other (see comments)(Not tolerating diet as high ostomy output persists )     Intake/Output Summary (Last 24 hours) at 3/5/2020 1043  Last data filed at 3/5/2020 0500  Gross per 24 hour   Intake 765 ml   Output 7300 ml   Net -6535 ml      Nutrition Risk  Level of  Risk/Frequency of Follow-up: high     Monitor and Evaluation  Food and Nutrient Intake: energy intake, parenteral nutrition intake, food and beverage intake  Food and Nutrient Adminstration: diet order, enteral and parenteral nutrition administration  Physical Activity and Function: nutrition-related ADLs and IADLs, factors affecting access to physical activity  Anthropometric Measurements: weight, weight change, body mass index  Biochemical Data, Medical Tests and Procedures: electrolyte and renal panel, glucose/endocrine profile, lipid profile, gastrointestinal profile, inflammatory profile  Nutrition-Focused Physical Findings: overall appearance, extremities, muscles and bones, head and eyes, skin     Nutrition Follow-Up  RD Follow-up?: Yes    Sandra Lovell RD 03/05/2020 10:46 AM

## 2020-03-05 NOTE — PLAN OF CARE
Problem: Occupational Therapy Goal  Goal: Occupational Therapy Goal  Description  Goals to be met by: discharge    Patient will increase functional independence with ADLs by performing:    Pt to be independent with UE HEP program of 3 x 10 reps each for reconditioning  UE Dressing with Supervision.  LE Dressing with Supervision.  Grooming while standing with Modified [place].  Toileting from toilet with Modified [place] for hygiene and clothing management.   Toilet transfer to toilet with Supervision.      Outcome: Ongoing, Progressing

## 2020-03-05 NOTE — PROGRESS NOTES
INPATIENT NEPHROLOGY PROGRESS NOTE  [facility] NEPHROLOGY    Shara BULMARO Hutchins  03/05/2020    Reason for consultation:        lawson    Chief Complaint:   Chief Complaint   Patient presents with    Fatigue     Recent ileosomy, 20lb weight loss this week, vaginal bleeding        History of Present Illness:      69F with HTN, DM2, hypothyroidism, secondary hyperparathyroidism, anemia, vulvar Ca (in remission), CKD3 presentswith abdominal pain, lightheadedness, vaginal bleeding. Had an ileostomy on 2/13, d/c on 2/17 (Dr. Meza) for chronic, debilitating diarrhea ? Pneumatosis intestinalis. Since then, she lost 20 lbs, has intermittent nausea and vomiting that is non-bloody and non-bilious, has high ostomy output - seeing whole pills at times. Pain to her abdominal incision site is worsening, had some lightheadedness when standing. Renal consulted for LAWSON.     2/25 VSS, no new complains.  2/26 VSS, sCr worse today. UO+OO = > 5L today!!! Will have to step up IVF significantly to match loss + some....  2/27 VSS, remains to have high output from ostomy, agree with liquid diet and TPN as d/w Dr. Kearns, no PICC line but a tunneled [place], she is close to dialysis, has AVF in one arm...  2/28 VSS, ostomy output is down and UO is up, agree with liquid diet and TPN as d/w Dr. Kearns, no PICC line but a tunneled [place], she is close to dialysis, has AVF in one arm...  2/29 VSS, ostomy output is down and UO is up, agree with liquid diet and TPN as d/w Dr. Kearns. No PICC line but a tunneled [place], she is close to dialysis, has AVF in one arm...  3/1 VSS, no new complains. Femoral cath is suboptimal location for access that has to stay there for 4-6 weeks. Patient will have to be bed ridden and might require LTAC.  3/2 No nausea, chest pain, sob, fever, urinary or bowel complaint, new neurologic symptoms, new joint pain,  3/3 No nausea, chest pain, sob, fever, urinary or bowel complaint, new neurologic symptoms, new joint  pain,  3/4  Renal function improving.  UOP 3.3L.  Feels better, no new complaints.  3/5 sleeping    Plan of Care:     Assessment\    Acute kidney injury due to volume depletion  --better after volume resuscitation  --no nsaids, coxibs  -keep map above 55    Anemia  --epogen   --iron ok    Volume depletion due to gi lossess  --better after iv fluids      Hyponatremia  --resolved       Thank you for allowing us to participate in this patient's care. We will continue to follow.    Medications:  No current facility-administered medications on file prior to encounter.      Current Outpatient Medications on File Prior to Encounter   Medication Sig Dispense Refill    calcitRIOL (ROCALTROL) 0.5 MCG Cap Take 1 capsule by mouth once daily.  3    dicyclomine (BENTYL) 10 MG capsule TAKE 1 CAPSULE THREE TIMES A DAY (Patient taking differently: Take 10 mg by mouth 3 (three) times daily. ) 270 capsule 4    diphenhydrAMINE (BENADRYL) 25 mg capsule Take 25 mg by mouth nightly as needed for Itching.      dronabinol (MARINOL) 2.5 MG capsule Take 1 capsule (2.5 mg total) by mouth 2 (two) times daily before meals. 60 capsule 0    HYDROcodone-acetaminophen (NORCO)  mg per tablet Take 1 tablet by mouth every 6 (six) hours as needed for Pain. 30 tablet 0    levothyroxine (SYNTHROID) 88 MCG tablet TAKE 1 TABLET DAILY (Patient taking differently: Take 88 mcg by mouth every morning. ) 90 tablet 4    magnesium oxide (MAG-OX) 400 mg (241.3 mg magnesium) tablet Take 400 mg by mouth 2 (two) times daily.      potassium chloride (KLOR-CON) 10 MEQ TbSR Take 20 mEq by mouth 2 (two) times daily.       promethazine (PHENERGAN) 25 MG tablet Take 1 tablet (25 mg total) by mouth daily as needed for Nausea. 90 tablet 0    sertraline (ZOLOFT) 50 MG tablet Take 50 mg by mouth once daily.       sodium bicarbonate 650 MG tablet Take 650 mg by mouth 3 (three) times daily.   2     Scheduled Meds:   cholestyramine-aspartame  1 packet Oral BID     diphenhydrAMINE  6.25 mg Intravenous Q6H    dronabinol  2.5 mg Oral BID AC    levothyroxine  88 mcg Oral QAM    loperamide  4 mg Oral QID    magnesium oxide  400 mg Oral BID    meropenem (MERREM) IVPB  500 mg Intravenous Daily    pantoprazole  40 mg Intravenous BID    potassium chloride 10%  40 mEq Oral BID    psyllium husk (with sugar)  2 packet Oral TID    sertraline  50 mg Oral Daily     Continuous Infusions:   lactated ringers 125 mL/hr at 03/05/20 0013    octreotide (SANDOSTATIN) infusion 25 mcg/hr (02/29/20 1736)    TPN ADULT CENTRAL LINE CUSTOM (3 in 1) 50 mL/hr at 03/04/20 1742     PRN Meds:.sodium chloride, acetaminophen, calcium chloride IVPB, calcium chloride IVPB, calcium chloride IVPB, codeine, dextrose 50%, dextrose 50%, glucagon (human recombinant), glucose, glucose, HYDROcodone-acetaminophen, magnesium oxide, magnesium sulfate IVPB, magnesium sulfate IVPB, magnesium sulfate IVPB, magnesium sulfate IVPB, melatonin, morphine, ondansetron, potassium chloride in water, potassium chloride in water, potassium chloride in water, potassium chloride in water, potassium chloride, potassium chloride, potassium chloride, potassium chloride, sodium chloride 0.9%, sodium chloride 0.9%, sodium phosphate IVPB, sodium phosphate IVPB, sodium phosphate IVPB, sodium phosphate IVPB, sodium phosphate IVPB, Pharmacy to dose Vancomycin consult **AND** vancomycin - pharmacy to dose    Allergies:  Ciprofloxacin and Pcn [penicillins]    Vital Signs:  Temp Readings from Last 3 Encounters:   03/05/20 98.3 °F (36.8 °C) (Oral)   02/19/20 97.8 °F (36.6 °C)   02/19/20 98.1 °F (36.7 °C)       Pulse Readings from Last 3 Encounters:   03/05/20 60   02/19/20 76   02/19/20 78       BP Readings from Last 3 Encounters:   03/05/20 119/61   02/19/20 131/76   02/19/20 (!) 100/58       Weight:  Wt Readings from Last 3 Encounters:   03/03/20 53 kg (116 lb 13.5 oz)   02/19/20 58.7 kg (129 lb 4.8 oz)   02/19/20 58.8 kg (129 lb 9.6  oz)       Review of Systems:  Review of Systems - All 14 systems reviewed and negative, except as noted in HPI    Physical Exam:    Constitutional: NAD  Neuro: No asterixis  Psych: Calm  EENT: NCAT, anicteric sclera   Neck:  supple  Cards: No rub  Lungs: clear to ausculation  GI:  Pos bowel sounds, no hsm, NTND   MSK:  WNWD  Skin: no purpura, rashes       Results:  Lab Results   Component Value Date     03/05/2020     03/05/2020    K 3.9 03/05/2020    K 3.9 03/05/2020     03/05/2020     03/05/2020    CO2 31 (H) 03/05/2020    CO2 31 (H) 03/05/2020    BUN 30 (H) 03/05/2020    BUN 30 (H) 03/05/2020    CREATININE 2.3 (H) 03/05/2020    CREATININE 2.3 (H) 03/05/2020    CALCIUM 7.9 (L) 03/05/2020    CALCIUM 7.9 (L) 03/05/2020    ANIONGAP 2 (L) 03/05/2020    ANIONGAP 2 (L) 03/05/2020    ESTGFRAFRICA 24.3 (A) 03/05/2020    ESTGFRAFRICA 24.3 (A) 03/05/2020    EGFRNONAA 21.1 (A) 03/05/2020    EGFRNONAA 21.1 (A) 03/05/2020       Lab Results   Component Value Date    CALCIUM 7.9 (L) 03/05/2020    CALCIUM 7.9 (L) 03/05/2020    PHOS 2.2 (L) 03/05/2020       Recent Labs   Lab 03/05/20  0454   WBC 3.60*   RBC 2.40*   HGB 7.5*   HCT 24.5*   PLT 87*   *   MCH 31.3*   MCHC 30.6*       I have personally reviewed pertinent radiological imaging and reports.

## 2020-03-06 ENCOUNTER — HOSPITAL ENCOUNTER (OUTPATIENT)
Facility: OTHER | Age: 70
Discharge: HOME HEALTH | DRG: 683 | End: 2020-03-07
Attending: HOSPITALIST | Admitting: HOSPITALIST
Payer: MEDICARE

## 2020-03-06 DIAGNOSIS — R19.7 DIARRHEA, UNSPECIFIED TYPE: ICD-10-CM

## 2020-03-06 DIAGNOSIS — Z87.898 HISTORY OF DIFFICULT VENOUS ACCESS: ICD-10-CM

## 2020-03-06 DIAGNOSIS — R07.9 CHEST PAIN: ICD-10-CM

## 2020-03-06 DIAGNOSIS — Z78.9 PROBLEM WITH VASCULAR ACCESS: Primary | ICD-10-CM

## 2020-03-06 PROBLEM — L89.95 UNSTAGEABLE PRESSURE INJURY OF SKIN AND TISSUE: Status: ACTIVE | Noted: 2020-03-06

## 2020-03-06 LAB
ALBUMIN SERPL BCP-MCNC: 2.1 G/DL (ref 3.5–5.2)
ALP SERPL-CCNC: 223 U/L (ref 55–135)
ALT SERPL W/O P-5'-P-CCNC: 14 U/L (ref 10–44)
ANION GAP SERPL CALC-SCNC: 6 MMOL/L (ref 8–16)
AST SERPL-CCNC: 21 U/L (ref 10–40)
BASOPHILS # BLD AUTO: 0.02 K/UL (ref 0–0.2)
BASOPHILS NFR BLD: 0.6 % (ref 0–1.9)
BILIRUB SERPL-MCNC: 0.7 MG/DL (ref 0.1–1)
BUN SERPL-MCNC: 28 MG/DL (ref 8–23)
CALCIUM SERPL-MCNC: 7.7 MG/DL (ref 8.7–10.5)
CHLORIDE SERPL-SCNC: 107 MMOL/L (ref 95–110)
CO2 SERPL-SCNC: 30 MMOL/L (ref 23–29)
CREAT SERPL-MCNC: 2.1 MG/DL (ref 0.5–1.4)
DIFFERENTIAL METHOD: ABNORMAL
EOSINOPHIL # BLD AUTO: 0.2 K/UL (ref 0–0.5)
EOSINOPHIL NFR BLD: 5.8 % (ref 0–8)
ERYTHROCYTE [DISTWIDTH] IN BLOOD BY AUTOMATED COUNT: 16 % (ref 11.5–14.5)
EST. GFR  (AFRICAN AMERICAN): 27 ML/MIN/1.73 M^2
EST. GFR  (NON AFRICAN AMERICAN): 23 ML/MIN/1.73 M^2
GLUCOSE SERPL-MCNC: 125 MG/DL (ref 70–110)
HCT VFR BLD AUTO: 24.7 % (ref 37–48.5)
HGB BLD-MCNC: 7.4 G/DL (ref 12–16)
IMM GRANULOCYTES # BLD AUTO: 0.02 K/UL (ref 0–0.04)
IMM GRANULOCYTES NFR BLD AUTO: 0.6 % (ref 0–0.5)
LYMPHOCYTES # BLD AUTO: 0.9 K/UL (ref 1–4.8)
LYMPHOCYTES NFR BLD: 23.5 % (ref 18–48)
MAGNESIUM SERPL-MCNC: 2.3 MG/DL (ref 1.6–2.6)
MCH RBC QN AUTO: 30.8 PG (ref 27–31)
MCHC RBC AUTO-ENTMCNC: 30 G/DL (ref 32–36)
MCV RBC AUTO: 103 FL (ref 82–98)
MONOCYTES # BLD AUTO: 0.4 K/UL (ref 0.3–1)
MONOCYTES NFR BLD: 12.2 % (ref 4–15)
NEUTROPHILS # BLD AUTO: 2.1 K/UL (ref 1.8–7.7)
NEUTROPHILS NFR BLD: 57.3 % (ref 38–73)
NRBC BLD-RTO: 0 /100 WBC
PHOSPHATE SERPL-MCNC: 2.4 MG/DL (ref 2.7–4.5)
PLATELET # BLD AUTO: 85 K/UL (ref 150–350)
PMV BLD AUTO: 12.8 FL (ref 9.2–12.9)
POTASSIUM SERPL-SCNC: 3.5 MMOL/L (ref 3.5–5.1)
PROT SERPL-MCNC: 4.2 G/DL (ref 6–8.4)
RBC # BLD AUTO: 2.4 M/UL (ref 4–5.4)
SODIUM SERPL-SCNC: 143 MMOL/L (ref 136–145)
WBC # BLD AUTO: 3.61 K/UL (ref 3.9–12.7)

## 2020-03-06 PROCEDURE — G0379 DIRECT REFER HOSPITAL OBSERV: HCPCS

## 2020-03-06 PROCEDURE — 63600175 PHARM REV CODE 636 W HCPCS: Performed by: RADIOLOGY

## 2020-03-06 PROCEDURE — 83735 ASSAY OF MAGNESIUM: CPT

## 2020-03-06 PROCEDURE — 96376 TX/PRO/DX INJ SAME DRUG ADON: CPT | Mod: 59

## 2020-03-06 PROCEDURE — 63600175 PHARM REV CODE 636 W HCPCS: Performed by: PHYSICIAN ASSISTANT

## 2020-03-06 PROCEDURE — 84100 ASSAY OF PHOSPHORUS: CPT

## 2020-03-06 PROCEDURE — 85025 COMPLETE CBC W/AUTO DIFF WBC: CPT

## 2020-03-06 PROCEDURE — 96361 HYDRATE IV INFUSION ADD-ON: CPT

## 2020-03-06 PROCEDURE — C1769 GUIDE WIRE: HCPCS | Performed by: RADIOLOGY

## 2020-03-06 PROCEDURE — G0378 HOSPITAL OBSERVATION PER HR: HCPCS

## 2020-03-06 PROCEDURE — 99153 MOD SED SAME PHYS/QHP EA: CPT | Performed by: RADIOLOGY

## 2020-03-06 PROCEDURE — 99223 PR INITIAL HOSPITAL CARE,LEVL III: ICD-10-PCS | Mod: ,,, | Performed by: HOSPITALIST

## 2020-03-06 PROCEDURE — C1751 CATH, INF, PER/CENT/MIDLINE: HCPCS | Performed by: RADIOLOGY

## 2020-03-06 PROCEDURE — 25000003 PHARM REV CODE 250: Performed by: RADIOLOGY

## 2020-03-06 PROCEDURE — 96375 TX/PRO/DX INJ NEW DRUG ADDON: CPT | Mod: 59

## 2020-03-06 PROCEDURE — C1887 CATHETER, GUIDING: HCPCS | Performed by: RADIOLOGY

## 2020-03-06 PROCEDURE — 25000003 PHARM REV CODE 250: Performed by: PHYSICIAN ASSISTANT

## 2020-03-06 PROCEDURE — 80053 COMPREHEN METABOLIC PANEL: CPT

## 2020-03-06 PROCEDURE — 99152 MOD SED SAME PHYS/QHP 5/>YRS: CPT | Performed by: RADIOLOGY

## 2020-03-06 PROCEDURE — C9113 INJ PANTOPRAZOLE SODIUM, VIA: HCPCS | Performed by: PHYSICIAN ASSISTANT

## 2020-03-06 PROCEDURE — 25500020 PHARM REV CODE 255: Performed by: RADIOLOGY

## 2020-03-06 PROCEDURE — 99223 1ST HOSP IP/OBS HIGH 75: CPT | Mod: ,,, | Performed by: HOSPITALIST

## 2020-03-06 PROCEDURE — 11000001 HC ACUTE MED/SURG PRIVATE ROOM

## 2020-03-06 PROCEDURE — 96374 THER/PROPH/DIAG INJ IV PUSH: CPT | Mod: 59

## 2020-03-06 PROCEDURE — C1894 INTRO/SHEATH, NON-LASER: HCPCS | Performed by: RADIOLOGY

## 2020-03-06 RX ORDER — LANOLIN ALCOHOL/MO/W.PET/CERES
400 CREAM (GRAM) TOPICAL 2 TIMES DAILY
Status: DISCONTINUED | OUTPATIENT
Start: 2020-03-06 | End: 2020-03-07 | Stop reason: HOSPADM

## 2020-03-06 RX ORDER — IBUPROFEN 200 MG
24 TABLET ORAL
Status: CANCELLED | OUTPATIENT
Start: 2020-03-06

## 2020-03-06 RX ORDER — GLUCAGON 1 MG
1 KIT INJECTION
Status: CANCELLED | OUTPATIENT
Start: 2020-03-06

## 2020-03-06 RX ORDER — DIPHENHYDRAMINE HYDROCHLORIDE 50 MG/ML
6.25 INJECTION INTRAMUSCULAR; INTRAVENOUS EVERY 6 HOURS
Status: CANCELLED | OUTPATIENT
Start: 2020-03-06

## 2020-03-06 RX ORDER — HYDROCODONE BITARTRATE AND ACETAMINOPHEN 5; 325 MG/1; MG/1
1 TABLET ORAL EVERY 6 HOURS PRN
Status: CANCELLED | OUTPATIENT
Start: 2020-03-06

## 2020-03-06 RX ORDER — DIPHENHYDRAMINE HYDROCHLORIDE 50 MG/ML
6.25 INJECTION INTRAMUSCULAR; INTRAVENOUS EVERY 6 HOURS
Status: DISCONTINUED | OUTPATIENT
Start: 2020-03-06 | End: 2020-03-06

## 2020-03-06 RX ORDER — ONDANSETRON 8 MG/1
8 TABLET, ORALLY DISINTEGRATING ORAL EVERY 8 HOURS PRN
Status: ON HOLD
Start: 2020-03-06 | End: 2020-05-22 | Stop reason: HOSPADM

## 2020-03-06 RX ORDER — LEVOTHYROXINE SODIUM 88 UG/1
88 TABLET ORAL
Status: CANCELLED | OUTPATIENT
Start: 2020-03-07

## 2020-03-06 RX ORDER — HYDROCODONE BITARTRATE AND ACETAMINOPHEN 5; 325 MG/1; MG/1
1 TABLET ORAL EVERY 6 HOURS PRN
Status: DISCONTINUED | OUTPATIENT
Start: 2020-03-06 | End: 2020-03-07

## 2020-03-06 RX ORDER — FENTANYL CITRATE 50 UG/ML
INJECTION, SOLUTION INTRAMUSCULAR; INTRAVENOUS
Status: DISCONTINUED | OUTPATIENT
Start: 2020-03-06 | End: 2020-03-06 | Stop reason: HOSPADM

## 2020-03-06 RX ORDER — CHOLESTYRAMINE 4 G/4.8G
1 POWDER, FOR SUSPENSION ORAL 2 TIMES DAILY
Status: CANCELLED | OUTPATIENT
Start: 2020-03-06

## 2020-03-06 RX ORDER — IBUPROFEN 200 MG
24 TABLET ORAL
Status: DISCONTINUED | OUTPATIENT
Start: 2020-03-06 | End: 2020-03-07 | Stop reason: HOSPADM

## 2020-03-06 RX ORDER — MIDAZOLAM HYDROCHLORIDE 1 MG/ML
INJECTION, SOLUTION INTRAMUSCULAR; INTRAVENOUS
Status: DISCONTINUED | OUTPATIENT
Start: 2020-03-06 | End: 2020-03-06 | Stop reason: HOSPADM

## 2020-03-06 RX ORDER — TALC
9 POWDER (GRAM) TOPICAL NIGHTLY PRN
Status: DISCONTINUED | OUTPATIENT
Start: 2020-03-06 | End: 2020-03-07 | Stop reason: HOSPADM

## 2020-03-06 RX ORDER — DRONABINOL 2.5 MG/1
2.5 CAPSULE ORAL
Status: CANCELLED | OUTPATIENT
Start: 2020-03-06

## 2020-03-06 RX ORDER — SODIUM CHLORIDE 0.9 % (FLUSH) 0.9 %
10 SYRINGE (ML) INJECTION EVERY 6 HOURS PRN
Status: DISCONTINUED | OUTPATIENT
Start: 2020-03-06 | End: 2020-03-07 | Stop reason: HOSPADM

## 2020-03-06 RX ORDER — ACETAMINOPHEN 325 MG/1
650 TABLET ORAL EVERY 4 HOURS PRN
Refills: 0 | Status: ON HOLD
Start: 2020-03-06 | End: 2020-05-22 | Stop reason: HOSPADM

## 2020-03-06 RX ORDER — POTASSIUM CHLORIDE 20 MEQ/15ML
40 SOLUTION ORAL 2 TIMES DAILY
Status: DISCONTINUED | OUTPATIENT
Start: 2020-03-06 | End: 2020-03-06

## 2020-03-06 RX ORDER — LEVOTHYROXINE SODIUM 88 UG/1
88 TABLET ORAL
Status: DISCONTINUED | OUTPATIENT
Start: 2020-03-06 | End: 2020-03-07 | Stop reason: HOSPADM

## 2020-03-06 RX ORDER — HYDROCODONE BITARTRATE AND ACETAMINOPHEN 5; 325 MG/1; MG/1
1 TABLET ORAL EVERY 6 HOURS PRN
Refills: 0 | Status: ON HOLD
Start: 2020-03-06 | End: 2020-03-27 | Stop reason: HOSPADM

## 2020-03-06 RX ORDER — PANTOPRAZOLE SODIUM 40 MG/10ML
40 INJECTION, POWDER, LYOPHILIZED, FOR SOLUTION INTRAVENOUS 2 TIMES DAILY
Qty: 2400 MG | Refills: 11 | Status: ON HOLD
Start: 2020-03-06 | End: 2020-05-22 | Stop reason: HOSPADM

## 2020-03-06 RX ORDER — DIPHENHYDRAMINE HYDROCHLORIDE 50 MG/ML
6.25 INJECTION INTRAMUSCULAR; INTRAVENOUS EVERY 6 HOURS
Status: ON HOLD
Start: 2020-03-06 | End: 2020-03-27 | Stop reason: HOSPADM

## 2020-03-06 RX ORDER — SERTRALINE HYDROCHLORIDE 50 MG/1
50 TABLET, FILM COATED ORAL DAILY
Status: CANCELLED | OUTPATIENT
Start: 2020-03-07

## 2020-03-06 RX ORDER — SODIUM CHLORIDE 0.9 % (FLUSH) 0.9 %
10 SYRINGE (ML) INJECTION EVERY 6 HOURS PRN
Status: CANCELLED | OUTPATIENT
Start: 2020-03-06

## 2020-03-06 RX ORDER — HEPARIN SOD,PORCINE/0.9 % NACL 1000/500ML
INTRAVENOUS SOLUTION INTRAVENOUS
Status: DISCONTINUED | OUTPATIENT
Start: 2020-03-06 | End: 2020-03-06 | Stop reason: HOSPADM

## 2020-03-06 RX ORDER — TALC
9 POWDER (GRAM) TOPICAL NIGHTLY PRN
Status: CANCELLED | OUTPATIENT
Start: 2020-03-06

## 2020-03-06 RX ORDER — ACETAMINOPHEN 325 MG/1
650 TABLET ORAL EVERY 4 HOURS PRN
Status: DISCONTINUED | OUTPATIENT
Start: 2020-03-06 | End: 2020-03-07 | Stop reason: HOSPADM

## 2020-03-06 RX ORDER — ONDANSETRON 8 MG/1
8 TABLET, ORALLY DISINTEGRATING ORAL EVERY 8 HOURS PRN
Status: DISCONTINUED | OUTPATIENT
Start: 2020-03-06 | End: 2020-03-07 | Stop reason: HOSPADM

## 2020-03-06 RX ORDER — SODIUM CHLORIDE, SODIUM LACTATE, POTASSIUM CHLORIDE, CALCIUM CHLORIDE 600; 310; 30; 20 MG/100ML; MG/100ML; MG/100ML; MG/100ML
INJECTION, SOLUTION INTRAVENOUS CONTINUOUS
Status: CANCELLED | OUTPATIENT
Start: 2020-03-06

## 2020-03-06 RX ORDER — CHOLESTYRAMINE 4 G/4.8G
1 POWDER, FOR SUSPENSION ORAL 2 TIMES DAILY
Status: DISCONTINUED | OUTPATIENT
Start: 2020-03-06 | End: 2020-03-07 | Stop reason: HOSPADM

## 2020-03-06 RX ORDER — SODIUM CHLORIDE, SODIUM LACTATE, POTASSIUM CHLORIDE, CALCIUM CHLORIDE 600; 310; 30; 20 MG/100ML; MG/100ML; MG/100ML; MG/100ML
INJECTION, SOLUTION INTRAVENOUS CONTINUOUS
Status: DISCONTINUED | OUTPATIENT
Start: 2020-03-06 | End: 2020-03-07 | Stop reason: HOSPADM

## 2020-03-06 RX ORDER — IBUPROFEN 200 MG
16 TABLET ORAL
Refills: 12 | Status: ON HOLD | COMMUNITY
Start: 2020-03-06 | End: 2020-05-22 | Stop reason: HOSPADM

## 2020-03-06 RX ORDER — GLUCAGON 1 MG
1 KIT INJECTION
Status: DISCONTINUED | OUTPATIENT
Start: 2020-03-06 | End: 2020-03-07 | Stop reason: HOSPADM

## 2020-03-06 RX ORDER — DRONABINOL 2.5 MG/1
2.5 CAPSULE ORAL
Status: DISCONTINUED | OUTPATIENT
Start: 2020-03-06 | End: 2020-03-07 | Stop reason: HOSPADM

## 2020-03-06 RX ORDER — LOPERAMIDE HYDROCHLORIDE 2 MG/1
4 CAPSULE ORAL 4 TIMES DAILY
Refills: 0 | Status: ON HOLD
Start: 2020-03-06 | End: 2020-03-27 | Stop reason: HOSPADM

## 2020-03-06 RX ORDER — ONDANSETRON 8 MG/1
8 TABLET, ORALLY DISINTEGRATING ORAL EVERY 8 HOURS PRN
Status: CANCELLED | OUTPATIENT
Start: 2020-03-06

## 2020-03-06 RX ORDER — ACETAMINOPHEN 325 MG/1
650 TABLET ORAL EVERY 4 HOURS PRN
Status: CANCELLED | OUTPATIENT
Start: 2020-03-06

## 2020-03-06 RX ORDER — MORPHINE SULFATE 2 MG/ML
2 INJECTION, SOLUTION INTRAMUSCULAR; INTRAVENOUS EVERY 4 HOURS PRN
Status: DISCONTINUED | OUTPATIENT
Start: 2020-03-06 | End: 2020-03-06

## 2020-03-06 RX ORDER — SERTRALINE HYDROCHLORIDE 50 MG/1
50 TABLET, FILM COATED ORAL DAILY
Status: DISCONTINUED | OUTPATIENT
Start: 2020-03-06 | End: 2020-03-07 | Stop reason: HOSPADM

## 2020-03-06 RX ORDER — LANOLIN ALCOHOL/MO/W.PET/CERES
400 CREAM (GRAM) TOPICAL 2 TIMES DAILY
Status: CANCELLED | OUTPATIENT
Start: 2020-03-06

## 2020-03-06 RX ORDER — PANTOPRAZOLE SODIUM 40 MG/10ML
40 INJECTION, POWDER, LYOPHILIZED, FOR SOLUTION INTRAVENOUS 2 TIMES DAILY
Status: DISCONTINUED | OUTPATIENT
Start: 2020-03-06 | End: 2020-03-07 | Stop reason: HOSPADM

## 2020-03-06 RX ORDER — TALC
9 POWDER (GRAM) TOPICAL NIGHTLY PRN
Refills: 0 | Status: ON HOLD
Start: 2020-03-06 | End: 2020-05-22 | Stop reason: HOSPADM

## 2020-03-06 RX ORDER — PANTOPRAZOLE SODIUM 40 MG/10ML
40 INJECTION, POWDER, LYOPHILIZED, FOR SOLUTION INTRAVENOUS 2 TIMES DAILY
Status: CANCELLED | OUTPATIENT
Start: 2020-03-06

## 2020-03-06 RX ORDER — IBUPROFEN 200 MG
16 TABLET ORAL
Status: CANCELLED | OUTPATIENT
Start: 2020-03-06

## 2020-03-06 RX ORDER — LIDOCAINE HYDROCHLORIDE 10 MG/ML
INJECTION INFILTRATION; PERINEURAL
Status: DISCONTINUED | OUTPATIENT
Start: 2020-03-06 | End: 2020-03-06 | Stop reason: HOSPADM

## 2020-03-06 RX ORDER — LEVOTHYROXINE SODIUM 88 UG/1
88 TABLET ORAL
Qty: 30 TABLET | Refills: 11 | Status: ON HOLD
Start: 2020-03-07 | End: 2020-12-11

## 2020-03-06 RX ORDER — LOPERAMIDE HYDROCHLORIDE 2 MG/1
4 CAPSULE ORAL 4 TIMES DAILY
Status: DISCONTINUED | OUTPATIENT
Start: 2020-03-06 | End: 2020-03-07 | Stop reason: HOSPADM

## 2020-03-06 RX ORDER — IBUPROFEN 200 MG
24 TABLET ORAL
Refills: 12 | Status: ON HOLD | COMMUNITY
Start: 2020-03-06 | End: 2020-03-27 | Stop reason: HOSPADM

## 2020-03-06 RX ORDER — IBUPROFEN 200 MG
16 TABLET ORAL
Status: DISCONTINUED | OUTPATIENT
Start: 2020-03-06 | End: 2020-03-07 | Stop reason: HOSPADM

## 2020-03-06 RX ORDER — LOPERAMIDE HYDROCHLORIDE 2 MG/1
4 CAPSULE ORAL 4 TIMES DAILY
Status: CANCELLED | OUTPATIENT
Start: 2020-03-06

## 2020-03-06 RX ORDER — DIPHENHYDRAMINE HYDROCHLORIDE 50 MG/ML
6.25 INJECTION INTRAMUSCULAR; INTRAVENOUS EVERY 6 HOURS
Status: DISCONTINUED | OUTPATIENT
Start: 2020-03-06 | End: 2020-03-07 | Stop reason: HOSPADM

## 2020-03-06 RX ORDER — SODIUM CHLORIDE, SODIUM LACTATE, POTASSIUM CHLORIDE, CALCIUM CHLORIDE 600; 310; 30; 20 MG/100ML; MG/100ML; MG/100ML; MG/100ML
125 INJECTION, SOLUTION INTRAVENOUS CONTINUOUS
Status: ON HOLD
Start: 2020-03-06 | End: 2020-05-18

## 2020-03-06 RX ORDER — HYDROCODONE BITARTRATE AND ACETAMINOPHEN 500; 5 MG/1; MG/1
TABLET ORAL
Status: CANCELLED | OUTPATIENT
Start: 2020-03-06

## 2020-03-06 RX ORDER — HYDROCODONE BITARTRATE AND ACETAMINOPHEN 500; 5 MG/1; MG/1
TABLET ORAL
Status: DISCONTINUED | OUTPATIENT
Start: 2020-03-06 | End: 2020-03-07 | Stop reason: HOSPADM

## 2020-03-06 RX ORDER — CHOLESTYRAMINE 4 G/4.8G
1 POWDER, FOR SUSPENSION ORAL 2 TIMES DAILY
Qty: 180 PACKET | Refills: 3 | Status: ON HOLD
Start: 2020-03-06 | End: 2020-05-22 | Stop reason: HOSPADM

## 2020-03-06 RX ADMIN — CHOLESTYRAMINE 4 G: 4 POWDER, FOR SUSPENSION ORAL at 09:03

## 2020-03-06 RX ADMIN — DIPHENHYDRAMINE HYDROCHLORIDE 6.25 MG: 50 INJECTION, SOLUTION INTRAMUSCULAR; INTRAVENOUS at 02:03

## 2020-03-06 RX ADMIN — HYDROCODONE BITARTRATE AND ACETAMINOPHEN 1 TABLET: 5; 325 TABLET ORAL at 08:03

## 2020-03-06 RX ADMIN — Medication 400 MG: at 09:03

## 2020-03-06 RX ADMIN — SERTRALINE HYDROCHLORIDE 50 MG: 50 TABLET ORAL at 09:03

## 2020-03-06 RX ADMIN — LOPERAMIDE HYDROCHLORIDE 4 MG: 2 CAPSULE ORAL at 08:03

## 2020-03-06 RX ADMIN — PANTOPRAZOLE SODIUM 40 MG: 40 INJECTION, POWDER, LYOPHILIZED, FOR SOLUTION INTRAVENOUS at 09:03

## 2020-03-06 RX ADMIN — SODIUM CHLORIDE, SODIUM LACTATE, POTASSIUM CHLORIDE, AND CALCIUM CHLORIDE: .6; .31; .03; .02 INJECTION, SOLUTION INTRAVENOUS at 06:03

## 2020-03-06 RX ADMIN — PSYLLIUM HUSK 2 PACKET: 3.4 POWDER ORAL at 08:03

## 2020-03-06 RX ADMIN — CHOLESTYRAMINE 4 G: 4 POWDER, FOR SUSPENSION ORAL at 08:03

## 2020-03-06 RX ADMIN — SODIUM CHLORIDE, SODIUM LACTATE, POTASSIUM CHLORIDE, AND CALCIUM CHLORIDE: .6; .31; .03; .02 INJECTION, SOLUTION INTRAVENOUS at 01:03

## 2020-03-06 RX ADMIN — OCTREOTIDE ACETATE 25 MCG/HR: 500 INJECTION, SOLUTION INTRAVENOUS; SUBCUTANEOUS at 02:03

## 2020-03-06 RX ADMIN — LOPERAMIDE HYDROCHLORIDE 4 MG: 2 CAPSULE ORAL at 01:03

## 2020-03-06 RX ADMIN — PANTOPRAZOLE SODIUM 40 MG: 40 INJECTION, POWDER, LYOPHILIZED, FOR SOLUTION INTRAVENOUS at 08:03

## 2020-03-06 RX ADMIN — LOPERAMIDE HYDROCHLORIDE 4 MG: 2 CAPSULE ORAL at 09:03

## 2020-03-06 RX ADMIN — Medication 400 MG: at 08:03

## 2020-03-06 RX ADMIN — LEVOTHYROXINE SODIUM 88 MCG: 88 TABLET ORAL at 05:03

## 2020-03-06 RX ADMIN — DIPHENHYDRAMINE HYDROCHLORIDE 6.25 MG: 50 INJECTION, SOLUTION INTRAMUSCULAR; INTRAVENOUS at 01:03

## 2020-03-06 RX ADMIN — PSYLLIUM HUSK 2 PACKET: 3.4 POWDER ORAL at 09:03

## 2020-03-06 RX ADMIN — DRONABINOL 2.5 MG: 2.5 CAPSULE ORAL at 05:03

## 2020-03-06 RX ADMIN — PSYLLIUM HUSK 2 PACKET: 3.4 POWDER ORAL at 04:03

## 2020-03-06 NOTE — ASSESSMENT & PLAN NOTE
- Ms. Shara Hutchins is admitted to inpatient status   - she presents from [facility] for CVC placement 2/2 difficulty obtaining venous access    - she reports LUE unable to be used for IV access   - RUE difficulty to obtain access in given overuse   - IR consulted

## 2020-03-06 NOTE — H&P
[facility] Hospital Medicine  History & Physical    Patient Name: Shara Hutchins  MRN: [id number]  Admission Date: 3/6/2020  Attending Physician: Clifford Allen MD   Primary Care Provider: April Horton MD         Patient information was obtained from patient, past medical records and ER records.     Subjective:     Principal Problem:History of difficult venous access    Chief Complaint: No chief complaint on file.       HPI: Ms. Shara Hutchins is a 69 y.o. female, with PMH of HTN, NIDDM-2, CKD-3, vulvar cancer (s/p chemo/radiation), and now s/p ileostomy after pneumatosis intestinalis earlier in 2020 and with persistent diarrhea 2/2 chemotherapy, who is transferred from [facility] for IR placement of CVC. Upon admission to [facility], she was having high output via her ileostomy, and subsequent DARCI and dehydration. She was placed on TPN, but there was difficulty getting IV access, and thus was receiving the TPN via a right femoral CVC, which was limiting therapy participation. As a tunneled line was unable to be obtained, she was transferred for IR services at [facility].       Past Medical History:   Diagnosis Date    Chronic kidney disease, stage III (moderate) 1/7/2018    Diabetes mellitus, type 2     Fatigue     Hypertension     Iron deficiency anemia due to chronic blood loss 1/7/2018    Vulvar cancer 1/7/2019       Past Surgical History:   Procedure Laterality Date    ABDOMINAL SURGERY      BACK SURGERY      CERVICAL FUSION      x 4    cervix repair      CHOLECYSTECTOMY  2000    DIAGNOSTIC LAPAROSCOPY N/A 2/13/2020    Procedure: LAPAROSCOPY, DIAGNOSTIC;  Surgeon: Robbi Lovell III, MD;  Location: [facility] OR;  Service: General;  Laterality: N/A;    HYSTERECTOMY  1980    ILEOSTOMY N/A 2/13/2020    Procedure: CREATION, ILEOSTOMY Loop;  Surgeon: Robbi Lovell III, MD;  Location: [facility] OR;  Service: General;  Laterality: N/A;    LUMBAR LAMINECTOMY      LYSIS OF  ADHESIONS N/A 2020    Procedure: LYSIS, ADHESIONS;  Surgeon: Robbi Lovell III, MD;  Location: [facility] OR;  Service: General;  Laterality: N/A;    NECK SURGERY      PHLEBOGRAPHY  2020    Procedure: VENOGRAM;  Surgeon: Robbi Lovell III, MD;  Location: [facility] OR;  Service: General;;    REMOVAL OF VASCULAR ACCESS PORT Right 2020    Procedure: REMOVAL, VASCULAR ACCESS PORT;  Surgeon: Robbi Lovell III, MD;  Location: [facility] OR;  Service: General;  Laterality: Right;    SHOULDER SURGERY      vulva cancer         Review of patient's allergies indicates:   Allergen Reactions    Ciprofloxacin Other (See Comments)     Elevated liver enzymes      Pcn [penicillins] Anaphylaxis       Current Facility-Administered Medications on File Prior to Encounter   Medication    [] TPN ADULT CENTRAL LINE CUSTOM (3 in 1)    [DISCONTINUED] 0.9%  NaCl infusion (for blood administration)    [DISCONTINUED] acetaminophen tablet 650 mg    [DISCONTINUED] calcium chloride 1g in sodium chloride 0.9% 100mL (ready to mix system)    [DISCONTINUED] calcium chloride 1g in sodium chloride 0.9% 100mL (ready to mix system)    [DISCONTINUED] calcium chloride 1g in sodium chloride 0.9% 100mL (ready to mix system)    [DISCONTINUED] cholestyramine-aspartame 4 gram packet 4 g    [DISCONTINUED] codeine tablet 30 mg    [DISCONTINUED] dextrose 50% injection 12.5 g    [DISCONTINUED] dextrose 50% injection 25 g    [DISCONTINUED] diphenhydrAMINE injection 6.25 mg    [DISCONTINUED] dronabinol capsule 2.5 mg    [DISCONTINUED] glucagon (human recombinant) injection 1 mg    [DISCONTINUED] glucose chewable tablet 16 g    [DISCONTINUED] glucose chewable tablet 24 g    [DISCONTINUED] HYDROcodone-acetaminophen 5-325 mg per tablet 1 tablet    [DISCONTINUED] lactated ringers infusion    [DISCONTINUED] levothyroxine tablet 88 mcg    [DISCONTINUED] loperamide capsule 4 mg    [DISCONTINUED] magnesium oxide tablet 400 mg     [DISCONTINUED] magnesium oxide tablet 800 mg    [DISCONTINUED] magnesium sulfate 2g in water 50mL IVPB (premix)    [DISCONTINUED] magnesium sulfate 2g in water 50mL IVPB (premix)    [DISCONTINUED] magnesium sulfate 2g in water 50mL IVPB (premix)    [DISCONTINUED] magnesium sulfate in dextrose IVPB (premix) 1 g    [DISCONTINUED] melatonin tablet 9 mg    [DISCONTINUED] meropenem 500 mg in sodium chloride 0.9 % 100 mL IVPB (ready to mix system)    [DISCONTINUED] morphine injection 2 mg    [DISCONTINUED] octreotide (SANDOSTATIN) 500 mcg in sodium chloride 0.9% 100 mL infusion    [DISCONTINUED] ondansetron disintegrating tablet 8 mg    [DISCONTINUED] pantoprazole injection 40 mg    [DISCONTINUED] potassium chloride 10 mEq in 100 mL IVPB    [DISCONTINUED] potassium chloride 10 mEq in 100 mL IVPB    [DISCONTINUED] potassium chloride 10 mEq in 100 mL IVPB    [DISCONTINUED] potassium chloride 10 mEq in 100 mL IVPB    [DISCONTINUED] potassium chloride 10% oral solution 40 mEq    [DISCONTINUED] potassium chloride SA CR tablet 20 mEq    [DISCONTINUED] potassium chloride SA CR tablet 20 mEq    [DISCONTINUED] potassium chloride SA CR tablet 40 mEq    [DISCONTINUED] potassium chloride SA CR tablet 40 mEq    [DISCONTINUED] psyllium husk (with sugar) 3.4 gram packet 2 packet    [DISCONTINUED] sertraline tablet 50 mg    [DISCONTINUED] sodium chloride 0.9% flush 10 mL    [DISCONTINUED] sodium chloride 0.9% flush 10 mL    [DISCONTINUED] sodium phosphate 15 mmol in dextrose 5 % 250 mL IVPB    [DISCONTINUED] sodium phosphate 15 mmol in dextrose 5 % 250 mL IVPB    [DISCONTINUED] sodium phosphate 20.01 mmol in dextrose 5 % 250 mL IVPB    [DISCONTINUED] sodium phosphate 20.01 mmol in dextrose 5 % 250 mL IVPB    [DISCONTINUED] sodium phosphate 30 mmol in dextrose 5 % 250 mL IVPB    [DISCONTINUED] TPN ADULT CENTRAL LINE CUSTOM (3 in 1)    [DISCONTINUED] vancomycin - pharmacy to dose     Current Outpatient  Medications on File Prior to Encounter   Medication Sig    calcitRIOL (ROCALTROL) 0.5 MCG Cap Take 1 capsule by mouth once daily.    cholestyramine-aspartame (QUESTRAN LIGHT) 4 gram PwPk Take 1 packet (4 g total) by mouth 2 (two) times daily.    dicyclomine (BENTYL) 10 MG capsule TAKE 1 CAPSULE THREE TIMES A DAY (Patient taking differently: Take 10 mg by mouth 3 (three) times daily. )    diphenhydrAMINE (BENADRYL) 25 mg capsule Take 25 mg by mouth nightly as needed for Itching.    dronabinol (MARINOL) 2.5 MG capsule Take 1 capsule (2.5 mg total) by mouth 2 (two) times daily before meals.    levothyroxine (SYNTHROID) 88 MCG tablet Take 1 tablet (88 mcg total) by mouth every morning.    loperamide (IMODIUM) 2 mg capsule Take 2 capsules (4 mg total) by mouth 4 (four) times daily as needed for Diarrhea.    magnesium oxide (MAG-OX) 400 mg (241.3 mg magnesium) tablet Take 400 mg by mouth 2 (two) times daily.    promethazine (PHENERGAN) 25 MG tablet Take 1 tablet (25 mg total) by mouth daily as needed for Nausea.    sertraline (ZOLOFT) 50 MG tablet Take 50 mg by mouth once daily.     sodium bicarbonate 650 MG tablet Take 650 mg by mouth 3 (three) times daily.      Family History     Problem Relation (Age of Onset)    Cancer Mother    Heart disease Mother, Father, Sister    Hypertension Mother    No Known Problems Daughter        Tobacco Use    Smoking status: Former Smoker     Packs/day: 1.00     Years: 33.00     Pack years: 33.00     Types: Cigarettes     Last attempt to quit: 2000     Years since quittin.6    Smokeless tobacco: Never Used   Substance and Sexual Activity    Alcohol use: No    Drug use: No    Sexual activity: Not on file     Review of Systems   Constitutional: Negative for appetite change, chills, diaphoresis, fatigue and fever.   Respiratory: Negative for cough, shortness of breath and wheezing.    Cardiovascular: Negative for chest pain and palpitations.   Gastrointestinal:  Positive for diarrhea. Negative for abdominal pain, blood in stool, constipation, nausea and vomiting.   Genitourinary: Negative for dysuria, flank pain, frequency, hematuria, urgency, vaginal discharge and vaginal pain.   Skin: Negative for color change, pallor, rash and wound.   Neurological: Positive for weakness. Negative for dizziness, syncope, light-headedness, numbness and headaches.   Psychiatric/Behavioral: Negative for agitation, confusion and decreased concentration.     Objective:     Vital Signs (Most Recent):  Temp: 98 °F (36.7 °C) (03/06/20 0453)  Pulse: (!) 58 (03/06/20 0600)  Resp: 18 (03/06/20 0453)  BP: 130/67 (03/06/20 0453)  SpO2: 98 % (03/06/20 0453) Vital Signs (24h Range):  Temp:  [97.7 °F (36.5 °C)-98.8 °F (37.1 °C)] 98 °F (36.7 °C)  Pulse:  [56-62] 58  Resp:  [16-18] 18  SpO2:  [92 %-99 %] 98 %  BP: (103-169)/(61-74) 130/67     Weight: 53 kg (116 lb 13.5 oz)  Body mass index is 18.3 kg/m².    Physical Exam   Constitutional: She is oriented to person, place, and time. She appears well-developed and well-nourished. No distress.   Thin & somewhat frail appearing.    HENT:   Head: Normocephalic and atraumatic.   Eyes: Pupils are equal, round, and reactive to light. Conjunctivae and EOM are normal. No scleral icterus.   Neck: Normal range of motion. Neck supple. No tracheal deviation present.   Cardiovascular: Normal rate, regular rhythm, normal heart sounds and intact distal pulses. Exam reveals no gallop and no friction rub.   No murmur heard.  Pulmonary/Chest: Effort normal and breath sounds normal. No stridor. No respiratory distress. She has no wheezes. She has no rales.   Abdominal: Soft. Bowel sounds are normal. She exhibits no distension. There is no tenderness. There is no guarding.   Ileostomy bag present in right side of abdomen.    Neurological: She is alert and oriented to person, place, and time. No cranial nerve deficit.   Skin: Skin is warm and dry. She is not diaphoretic.    Psychiatric: She has a normal mood and affect. Her behavior is normal. Judgment and thought content normal.   Nursing note and vitals reviewed.        CRANIAL NERVES     CN III, IV, VI   Pupils are equal, round, and reactive to light.  Extraocular motions are normal.        Significant Labs:   BMP:   Recent Labs   Lab 03/06/20 0432   *      K 3.5      CO2 30*   BUN 28*   CREATININE 2.1*   CALCIUM 7.7*   MG 2.3     CBC:   Recent Labs   Lab 03/05/20 0454 03/06/20 0432   WBC 3.60* 3.61*   HGB 7.5* 7.4*   HCT 24.5* 24.7*   PLT 87* 85*     CMP:   Recent Labs   Lab 03/05/20 0454 03/06/20 0432     141 143   K 3.9  3.9 3.5     108 107   CO2 31*  31* 30*     106 125*   BUN 30*  30* 28*   CREATININE 2.3*  2.3* 2.1*   CALCIUM 7.9*  7.9* 7.7*   PROT 4.3* 4.2*   ALBUMIN 2.4* 2.1*   BILITOT 1.3* 0.7   ALKPHOS 217* 223*   AST 22 21   ALT 15 14   ANIONGAP 2*  2* 6*   EGFRNONAA 21.1*  21.1* 23*     Urine Culture: No results for input(s): LABURIN in the last 48 hours.  Urine Studies: No results for input(s): COLORU, APPEARANCEUA, PHUR, SPECGRAV, PROTEINUA, GLUCUA, KETONESU, BILIRUBINUA, OCCULTUA, NITRITE, UROBILINOGEN, LEUKOCYTESUR, RBCUA, WBCUA, BACTERIA, SQUAMEPITHEL, HYALINECASTS in the last 48 hours.    Invalid input(s): WRIGHTSUR  All pertinent labs within the past 24 hours have been reviewed.    Significant Imaging: I have reviewed all pertinent imaging results/findings within the past 24 hours.          Assessment/Plan:     * History of difficult venous access  - Ms. Shara Hutchins is admitted to inpatient status   - she presents from [facility] for CVC placement 2/2 difficulty obtaining venous access    - she reports LUE unable to be used for IV access   - RUE difficulty to obtain access in given overuse   - IR consulted.  If new line placed successfully, will transfer back to [place] today.      DNR (do not resuscitate)  - noted and code status updated       S/P ileostomy  -  functioning well, no surrounding infection or evidence of leakage   - ileostomy care ordered       Acute renal failure superimposed on stage 5 chronic kidney disease, not on chronic dialysis  - improving with Cr of 2.1 today   - avoid nephrotoxins   - renally dose meds   - monitor       Diarrhea  - chronic   - states she has had this since chemo   - continue anti-diarrheal meds   - monitor I&Os      Anemia of chronic disease  - appears stable   - no current/ongoing losses   - monitor       Hypothyroidism  - continue synthroid        VTE Risk Mitigation (From admission, onward)    None             FÁTIMA TorresC  Department of Hospital Medicine   [facility]

## 2020-03-06 NOTE — PROGRESS NOTES
Consulted to see for sacral stage 2 pressure injury  69 y.o. female, with PMH of HTN, NIDDM-2, CKD-3, vulvar cancer (s/p chemo/radiation), and now s/p ileostomy after pneumatosis intestinalis earlier in 2020 and with persistent diarrhea 2/2 chemotherapy, who is transferred from [facility] for IR placement of CVC. Reports high volume ileostomy with DARCI and dehydration and initially placed on TPN. Transferred to [facility] for PICC placement.       Persistent erythema from previous site of radiation measuring 6x5cm   Yellow slough filled ulcerations identified to bilateral buttocks  Left buttock ulcer is 1x1x0.1cm , right buttock ulcer is 1.5x1x0.1cm.   Recommend use of Triad hydrophilic wound dressing to affected area which will autolytically debride the necrotic tissue from the wound bed.   Will also order a chair cushion for transport.   Madina Granado RN CWON

## 2020-03-06 NOTE — DISCHARGE SUMMARY
[facility] Hospital Medicine  Discharge Summary      Patient Name: Shara Hutchins  MRN: [id number]  Admission Date: 3/6/2020  Hospital Length of Stay: 0 days  Discharge Date and Time:  03/06/2020 2:22 PM  Attending Physician: Nicole Allen MD   Discharging Provider: Nicole Allen MD  Primary Care Provider: April Horton MD      HPI:   Ms. Shara Hutchins is a 69 y.o. 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 Upon admission to [facility], she was having high output via her ileostomy, and subsequent DARCI and dehydration. She was placed on TPN, but there was difficulty getting IV access, and thus was receiving the TPN via a right femoral CVC, which was limiting therapy participation. As a tunneled line was unable to be obtained, she was transferred for IR services at [facility].     Procedure(s) (LRB):  Insertion, Picc, Age 5 Years Or Older (Right)      Consults:   Consults (From admission, onward)        Status Ordering Provider     Inpatient consult to Interventional Radiology  Once     Provider:  Gallito Treviño MD    Completed OTTO LEYVA     Inpatient consult to Registered Dietitian/Nutritionist  Once     Provider:  (Not yet assigned)    Acknowledged NICOLE ALLEN     Inpatient consult to Registered Dietitian/Nutritionist  Once     Provider:  (Not yet assigned)    Acknowledged NICOLE ALLEN        Hospital Course By Problem:   * History of difficult venous access  - Ms. Shara Hutchins was admitted to inpatient status   - she presents from [facility] for venous access placement 2/2 difficulty obtaining venous access   - she reports LUE unable to be used for IV access  - RUE difficulty to obtain access in given overuse   - IR consulted and have placed a dual port  tunneled PICC line which is functioning well.  - She is medically cleared to return to [facility] today once a bed is available.    Unstageable pressure injury of skin and tissue  -Wound care consulted    DNR (do not resuscitate)  - noted and code status updated     S/P ileostomy  - functioning well, no surrounding infection or evidence of leakage   - ileostomy care ordered     Acute renal failure superimposed on stage 5 chronic kidney disease, not on chronic dialysis  - improving with Cr of 2.1 today   - avoid nephrotoxins   - renally dose meds   - monitor     Diarrhea  - chronic   - states she has had this since chemo   - continue anti-diarrheal meds   - monitor I&Os      Anemia of chronic disease  - appears stable   - no current/ongoing losses   - monitor     Hypothyroidism  - continue synthroid    Final Active Diagnoses:    Diagnosis Date Noted POA    PRINCIPAL PROBLEM:  History of difficult venous access [Z87.898] 03/06/2020 Yes    Unstageable pressure injury of skin and tissue [L89.95] 03/06/2020 Yes    DNR (do not resuscitate) [Z66] 02/24/2020 Yes    S/P ileostomy [Z93.2] 02/13/2020 Not Applicable    Anemia of chronic disease [D63.8] 02/10/2020 Yes     Chronic    Acute renal failure superimposed on stage 5 chronic kidney disease, not on chronic dialysis [N17.9, N18.5] 02/10/2020 Yes    Diarrhea [R19.7] 02/10/2020 Yes    Hypothyroidism [E03.9] 02/10/2020 Yes     Chronic      Problems Resolved During this Admission:       Discharged Condition: fair    Disposition: Another Health Care Inst*    Follow Up:    Patient Instructions:   No discharge procedures on file.    Significant Diagnostic Studies: Labs:   BMP:   Recent Labs   Lab 03/05/20 0454 03/06/20 0432     106 125*     141 143   K 3.9  3.9 3.5     108 107   CO2 31*  31* 30*   BUN 30*  30* 28*   CREATININE 2.3*  2.3* 2.1*   CALCIUM 7.9*  7.9* 7.7*   MG 2.2 2.3   , CMP   Recent Labs   Lab 03/05/20  6644  03/06/20 0432     141 143   K 3.9  3.9 3.5     108 107   CO2 31*  31* 30*     106 125*   BUN 30*  30* 28*   CREATININE 2.3*  2.3* 2.1*   CALCIUM 7.9*  7.9* 7.7*   PROT 4.3* 4.2*   ALBUMIN 2.4* 2.1*   BILITOT 1.3* 0.7   ALKPHOS 217* 223*   AST 22 21   ALT 15 14   ANIONGAP 2*  2* 6*   ESTGFRAFRICA 24.3*  24.3* 27*   EGFRNONAA 21.1*  21.1* 23*    and CBC   Recent Labs   Lab 03/05/20 0454 03/06/20 0432   WBC 3.60* 3.61*   HGB 7.5* 7.4*   HCT 24.5* 24.7*   PLT 87* 85*       Pending Diagnostic Studies:     Procedure Component Value Units Date/Time    IR PICC Line Placement w/o Port w/ Img > 4 Y/O [[id number]] Resulted:  03/06/20 1022    Order Status:  Sent Lab Status:  No result Updated:  03/06/20 1022         Medications:  Reconciled Home Medications:      Medication List      START taking these medications    acetaminophen 325 MG tablet  Commonly known as:  TYLENOL  Take 2 tablets (650 mg total) by mouth every 4 (four) hours as needed.     * dextrose 50%  injection  Inject 25 mLs (12.5 g total) into the vein as needed (between 51 - 70 mg/dL if patient cannnot take PO but has IV access.).     * dextrose 50%  injection  Inject 50 mLs (25 g total) into the vein as needed (less than 50 mg/dL if patient cannnot take PO but has IV access.).     diphenhydrAMINE 50 mg/mL injection  Commonly known as:  BENADRYL  Inject 0.125 mLs (6.25 mg total) into the vein every 6 (six) hours.  Replaces:  diphenhydrAMINE 25 mg capsule     * glucose 4 GM chewable tablet  Take 4 tablets (16 g total) by mouth as needed.     * glucose 4 GM chewable tablet  Take 6 tablets (24 g total) by mouth as needed.     HYDROcodone-acetaminophen 5-325 mg per tablet  Commonly known as:  NORCO  Take 1 tablet by mouth every 6 (six) hours as needed.     lactated ringers infusion  Inject 125 mL/hr into the vein continuous.     melatonin 3 mg tablet  Commonly known as:  MELATIN  Take 3 tablets (9 mg total) by mouth nightly as needed  for Insomnia.     ondansetron 8 MG Tbdl  Commonly known as:  ZOFRAN-ODT  Take 1 tablet (8 mg total) by mouth every 8 (eight) hours as needed.     pantoprazole 40 mg injection  Commonly known as:  PROTONIX  Inject 40 mg into the vein 2 (two) times daily.     psyllium husk (with sugar) 3.4 gram packet  Take 2 packets by mouth 3 (three) times daily.         * This list has 4 medication(s) that are the same as other medications prescribed for you. Read the directions carefully, and ask your doctor or other care provider to review them with you.            CHANGE how you take these medications    levothyroxine 88 MCG tablet  Commonly known as:  SYNTHROID  Take 1 tablet (88 mcg total) by mouth before breakfast.  Start taking on:  March 7, 2020  What changed:  when to take this     * loperamide 2 mg capsule  Commonly known as:  IMODIUM  Take 2 capsules (4 mg total) by mouth 4 (four) times daily as needed for Diarrhea.  What changed:  Another medication with the same name was added. Make sure you understand how and when to take each.     * loperamide 2 mg capsule  Commonly known as:  IMODIUM  Take 2 capsules (4 mg total) by mouth 4 (four) times daily. for 10 days  What changed:  You were already taking a medication with the same name, and this prescription was added. Make sure you understand how and when to take each.         * This list has 2 medication(s) that are the same as other medications prescribed for you. Read the directions carefully, and ask your doctor or other care provider to review them with you.            CONTINUE taking these medications    cholestyramine-aspartame 4 gram Pwpk  Commonly known as:  QUESTRAN LIGHT  Take 1 packet (4 g total) by mouth 2 (two) times daily.        STOP taking these medications    calcitRIOL 0.5 MCG Cap  Commonly known as:  ROCALTROL     dicyclomine 10 MG capsule  Commonly known as:  BENTYL     diphenhydrAMINE 25 mg capsule  Commonly known as:  BENADRYL  Replaced by:   diphenhydrAMINE 50 mg/mL injection     dronabinol 2.5 MG capsule  Commonly known as:  MARINOL     magnesium oxide 400 mg (241.3 mg magnesium) tablet  Commonly known as:  MAG-OX     promethazine 25 MG tablet  Commonly known as:  PHENERGAN     sertraline 50 MG tablet  Commonly known as:  ZOLOFT     sodium bicarbonate 650 MG tablet            Indwelling Lines/Drains at time of discharge:   Lines/Drains/Airways     Peripherally Inserted Central Catheter Line            PICC Double Lumen 03/06/20 1154 other (see comments) less than 1 day          Central Venous Catheter Line            Tunneled Central Line Insertion/Assessment - Triple Lumen  02/28/20 2035 right femoral vein;right femoral artery 6 days          Drain                 Hemodialysis AV Fistula Left upper arm -- days         Ileostomy 02/13/20 1600 Loop RLQ 21 days         Urethral Catheter 02/23/20 2350 Non-latex 14 Fr. 11 days          Pressure Ulcer                 Pressure Injury 02/27/20 1837 Left Buttocks Stage 2 7 days         Pressure Injury 02/27/20 1837 Right Buttocks Stage 2 7 days                Time spent on the discharge of patient: 35 minutes  Patient was seen and examined on the date of discharge and determined to be suitable for discharge.         Clifford Allen MD  Department of Hospital Medicine  [facility]

## 2020-03-06 NOTE — DISCHARGE SUMMARY
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  Patient reported weight loss of 20 lb in 1 week, since discharge from the hospital  Patient admitted to ICU, currently hypotensive but not on vasopressor.  Awake alert and oriented.  Patient requested a DNR status while in house.  General surgery, nephrology, Gastroenterology, dietitian and wound care consulted    3/3: I have consulted IR for central line placement. Pt has a right femoral line in placed for TPN.  3/4: Pt is feeling better but we are unable to obtain long term venous access. IR and anesthesia are not options here. Plan: called transfer center line and will attempt to transfer to [facility] or [facility]    3/5: We are awaiting word from the transfer center re referral for venous access.  Pt refuses to take K+ liquid and the pills will not be absorbed. Her K+ is 3.9.  Pt has a stage 2 decubitus on her sacrum treated with turning, partitioning and local care    At this point I will discontinue antibiotics as pt has been on them since 2/23/2020  Please continue TPN     Consults:   Consults (From admission, onward)        Status Ordering Provider     Inpatient consult to Anesthesiology  Once     Provider:  Moreno Gaming MD    Acknowledged BERE PHAN      Inpatient consult to Gastroenterology  Once     Provider:  Edwardo Wynn III, MD    Completed MARILYNN PANDA     Inpatient consult to General Surgery  Once     Provider:  Jus Douglas III, MD    Completed ROCIO LYLE     Inpatient consult to Hospitalist  Once     Provider:  Rocio Lyle MD    Acknowledged JUS DOUGLAS III     Inpatient consult to Nephrology  Once     Provider:  Yasmin Parker MD    Completed ROCIO LYLE     Inpatient consult to Registered Dietitian/Nutritionist  Once     Provider:  (Not yet assigned)    Completed ROCIO LYLE     Inpatient consult to Registered Dietitian/Nutritionist  Once     Provider:  (Not yet assigned)    Completed JUS DOUGLAS III     Inpatient consult to Registered Dietitian/Nutritionist  Once     Provider:  (Not yet assigned)    Completed JUS DOUGLAS III     Inpatient consult to Social Work/Case Management  Once     Provider:  (Not yet assigned)    Completed ROCIO LYLE     Inpatient consult to Social Work/Case Management  Once     Provider:  (Not yet assigned)    Acknowledged BERE SALAZAR     Pharmacy to dose Vancomycin consult  Once     Provider:  (Not yet assigned)    Acknowledged JUS DOUGLAS III     Pharmacy to dose Vancomycin consult  Once     Provider:  (Not yet assigned)    Acknowledged JUS DOUGLAS III          No new Assessment & Plan notes have been filed under this hospital service since the last note was generated.  Service: Hospital Medicine    Final Active Diagnoses:    Diagnosis Date Noted POA    PRINCIPAL PROBLEM:  Severe dehydration due to chronic diarrhea/high output ileostomy [E86.0] 02/24/2020 Yes    DNR (do not resuscitate) [Z66] 02/24/2020 Yes    Electrolyte imbalance [E87.8] 02/24/2020 Yes    Severe malnutrition [E43] 02/24/2020 Yes    DARCI (acute kidney injury) [N17.9] 02/23/2020 Yes      Problems Resolved During this Admission:       Discharged Condition:  good    Disposition: Home or Self Care    Follow Up:  Follow-up Information     [facility].    Why:  will bbe admitted this date for venous access               Patient Instructions:      Diet diabetic     Diet Cardiac     Diet renal     Notify your health care provider if you experience any of the following:  temperature >100.4     Notify your health care provider if you experience any of the following:  persistent nausea and vomiting or diarrhea     Notify your health care provider if you experience any of the following:  severe uncontrolled pain     Notify your health care provider if you experience any of the following:  redness, tenderness, or signs of infection (pain, swelling, redness, odor or green/yellow discharge around incision site)     Notify your health care provider if you experience any of the following:  difficulty breathing or increased cough     Notify your health care provider if you experience any of the following:  severe persistent headache     Notify your health care provider if you experience any of the following:  worsening rash     Notify your health care provider if you experience any of the following:  persistent dizziness, light-headedness, or visual disturbances     Notify your health care provider if you experience any of the following:  increased confusion or weakness     Leave dressing on - Keep it clean, dry, and intact until clinic visit     Activity as tolerated       Significant Diagnostic Studies: Labs:   BMP:   Recent Labs   Lab 03/04/20 0457 03/05/20 0454     102 106  106     141 141  141   K 3.8  3.8 3.9  3.9     107 108  108   CO2 28  28 31*  31*   BUN 32*  32* 30*  30*   CREATININE 2.4*  2.4* 2.3*  2.3*   CALCIUM 8.1*  8.1* 7.9*  7.9*   MG 2.2 2.2   , CMP   Recent Labs   Lab 03/04/20 0457 03/05/20 0454     141 141  141   K 3.8  3.8 3.9  3.9     107 108  108   CO2 28  28 31*  31*     102 106   106   BUN 32*  32* 30*  30*   CREATININE 2.4*  2.4* 2.3*  2.3*   CALCIUM 8.1*  8.1* 7.9*  7.9*   PROT 4.4* 4.3*   ALBUMIN 2.4* 2.4*   BILITOT 1.2* 1.3*   ALKPHOS 228* 217*   AST 21 22   ALT 15 15   ANIONGAP 6*  6* 2*  2*   ESTGFRAFRICA 23.0*  23.0* 24.3*  24.3*   EGFRNONAA 20.0*  20.0* 21.1*  21.1*    and CBC   Recent Labs   Lab 03/04/20  0457 03/05/20  0454   WBC 4.46 3.60*   HGB 7.9* 7.5*   HCT 25.6* 24.5*   PLT 95* 87*       Pending Diagnostic Studies:     None         Medications:  Reconciled Home Medications:      Medication List      START taking these medications    cholestyramine-aspartame 4 gram Pwpk  Commonly known as:  QUESTRAN LIGHT  Take 1 packet (4 g total) by mouth 2 (two) times daily.     loperamide 2 mg capsule  Commonly known as:  IMODIUM  Take 2 capsules (4 mg total) by mouth 4 (four) times daily as needed for Diarrhea.        CONTINUE taking these medications    calcitRIOL 0.5 MCG Cap  Commonly known as:  ROCALTROL  Take 1 capsule by mouth once daily.     dicyclomine 10 MG capsule  Commonly known as:  BENTYL  TAKE 1 CAPSULE THREE TIMES A DAY     diphenhydrAMINE 25 mg capsule  Commonly known as:  BENADRYL  Take 25 mg by mouth nightly as needed for Itching.     dronabinol 2.5 MG capsule  Commonly known as:  MARINOL  Take 1 capsule (2.5 mg total) by mouth 2 (two) times daily before meals.     levothyroxine 88 MCG tablet  Commonly known as:  SYNTHROID  Take 1 tablet (88 mcg total) by mouth every morning.  Start taking on:  March 6, 2020     magnesium oxide 400 mg (241.3 mg magnesium) tablet  Commonly known as:  MAG-OX  Take 400 mg by mouth 2 (two) times daily.     promethazine 25 MG tablet  Commonly known as:  PHENERGAN  Take 1 tablet (25 mg total) by mouth daily as needed for Nausea.     sodium bicarbonate 650 MG tablet  Take 650 mg by mouth 3 (three) times daily.        STOP taking these medications    HYDROcodone-acetaminophen  mg per tablet  Commonly known as:  NORCO      potassium chloride 10 MEQ Tbsr  Commonly known as:  KLOR-CON        ASK your doctor about these medications    sertraline 50 MG tablet  Commonly known as:  ZOLOFT  Take 50 mg by mouth once daily.            Indwelling Lines/Drains at time of discharge:   Lines/Drains/Airways     Central Venous Catheter Line            Tunneled Central Line Insertion/Assessment - Triple Lumen  02/28/20 2035 right femoral vein;right femoral artery 5 days          Drain                 Hemodialysis AV Fistula Left upper arm -- days         Ileostomy 02/13/20 1600 Loop RLQ 21 days         Urethral Catheter 02/23/20 2350 Non-latex 14 Fr. 10 days          Pressure Ulcer                 Pressure Injury 02/27/20 1837 Left Buttocks Stage 2 6 days         Pressure Injury 02/27/20 1837 Right Buttocks Stage 2 6 days                Time spent on the discharge of patient: 40 minutes  Patient was seen and examined on the date of discharge and determined to be suitable for discharge.         Ashwin Diaz MD  Department of Hospital Medicine  [facility] hospital

## 2020-03-06 NOTE — ASSESSMENT & PLAN NOTE
- Ms. Shara Hutchins was admitted to inpatient status   - she presents from [facility] for venous access placement 2/2 difficulty obtaining venous access   - she reports LUE unable to be used for IV access  - RUE difficulty to obtain access in given overuse   - IR consulted and have placed a dual port tunneled PICC line which is functioning well.  - She is medically cleared to return to [facility] today once a bed is available.

## 2020-03-06 NOTE — PROCEDURES
Radiology Post-Procedure Note    Pre Op Diagnosis: difficult access  Post Op Diagnosis: Same    Procedure: R sided tunneled PICC placement    Procedure performed by: Gallito Treviño MD    Written Informed Consent Obtained: Yes  Specimen Removed: NO  Estimated Blood Loss: Minimal    Findings:   Successful placement of tunneled PICC via R neck collateral vein, tip R atrium, ready for use.    Patient tolerated procedure well.    @SIG@

## 2020-03-06 NOTE — SUBJECTIVE & OBJECTIVE
Past Medical History:   Diagnosis Date    Chronic kidney disease, stage III (moderate) 2018    Diabetes mellitus, type 2     Fatigue     Hypertension     Iron deficiency anemia due to chronic blood loss 2018    Vulvar cancer 2019       Past Surgical History:   Procedure Laterality Date    ABDOMINAL SURGERY      BACK SURGERY      CERVICAL FUSION      x 4    cervix repair      CHOLECYSTECTOMY      DIAGNOSTIC LAPAROSCOPY N/A 2020    Procedure: LAPAROSCOPY, DIAGNOSTIC;  Surgeon: Robbi Lovell III, MD;  Location: [facility] OR;  Service: General;  Laterality: N/A;    HYSTERECTOMY  1980    ILEOSTOMY N/A 2020    Procedure: CREATION, ILEOSTOMY Loop;  Surgeon: Robbi Lovell III, MD;  Location: [facility] OR;  Service: General;  Laterality: N/A;    LUMBAR LAMINECTOMY      LYSIS OF ADHESIONS N/A 2020    Procedure: LYSIS, ADHESIONS;  Surgeon: Robbi Lovell III, MD;  Location: [facility] OR;  Service: General;  Laterality: N/A;    NECK SURGERY      PHLEBOGRAPHY  2020    Procedure: VENOGRAM;  Surgeon: Robbi Lovell III, MD;  Location: [facility] OR;  Service: General;;    REMOVAL OF VASCULAR ACCESS PORT Right 2020    Procedure: REMOVAL, VASCULAR ACCESS PORT;  Surgeon: Robbi Lovell III, MD;  Location: [facility];  Service: General;  Laterality: Right;    SHOULDER SURGERY      vulva cancer         Review of patient's allergies indicates:   Allergen Reactions    Ciprofloxacin Other (See Comments)     Elevated liver enzymes      Pcn [penicillins] Anaphylaxis       Current Facility-Administered Medications on File Prior to Encounter   Medication    [] TPN ADULT CENTRAL LINE CUSTOM (3 in 1)    [DISCONTINUED] 0.9%  NaCl infusion (for blood administration)    [DISCONTINUED] acetaminophen tablet 650 mg    [DISCONTINUED] calcium chloride 1g in sodium chloride 0.9% 100mL (ready to mix system)    [DISCONTINUED] calcium chloride 1g in sodium chloride 0.9% 100mL (ready  to mix system)    [DISCONTINUED] calcium chloride 1g in sodium chloride 0.9% 100mL (ready to mix system)    [DISCONTINUED] cholestyramine-aspartame 4 gram packet 4 g    [DISCONTINUED] codeine tablet 30 mg    [DISCONTINUED] dextrose 50% injection 12.5 g    [DISCONTINUED] dextrose 50% injection 25 g    [DISCONTINUED] diphenhydrAMINE injection 6.25 mg    [DISCONTINUED] dronabinol capsule 2.5 mg    [DISCONTINUED] glucagon (human recombinant) injection 1 mg    [DISCONTINUED] glucose chewable tablet 16 g    [DISCONTINUED] glucose chewable tablet 24 g    [DISCONTINUED] HYDROcodone-acetaminophen 5-325 mg per tablet 1 tablet    [DISCONTINUED] lactated ringers infusion    [DISCONTINUED] levothyroxine tablet 88 mcg    [DISCONTINUED] loperamide capsule 4 mg    [DISCONTINUED] magnesium oxide tablet 400 mg    [DISCONTINUED] magnesium oxide tablet 800 mg    [DISCONTINUED] magnesium sulfate 2g in water 50mL IVPB (premix)    [DISCONTINUED] magnesium sulfate 2g in water 50mL IVPB (premix)    [DISCONTINUED] magnesium sulfate 2g in water 50mL IVPB (premix)    [DISCONTINUED] magnesium sulfate in dextrose IVPB (premix) 1 g    [DISCONTINUED] melatonin tablet 9 mg    [DISCONTINUED] meropenem 500 mg in sodium chloride 0.9 % 100 mL IVPB (ready to mix system)    [DISCONTINUED] morphine injection 2 mg    [DISCONTINUED] octreotide (SANDOSTATIN) 500 mcg in sodium chloride 0.9% 100 mL infusion    [DISCONTINUED] ondansetron disintegrating tablet 8 mg    [DISCONTINUED] pantoprazole injection 40 mg    [DISCONTINUED] potassium chloride 10 mEq in 100 mL IVPB    [DISCONTINUED] potassium chloride 10 mEq in 100 mL IVPB    [DISCONTINUED] potassium chloride 10 mEq in 100 mL IVPB    [DISCONTINUED] potassium chloride 10 mEq in 100 mL IVPB    [DISCONTINUED] potassium chloride 10% oral solution 40 mEq    [DISCONTINUED] potassium chloride SA CR tablet 20 mEq    [DISCONTINUED] potassium chloride SA CR tablet 20 mEq     [DISCONTINUED] potassium chloride SA CR tablet 40 mEq    [DISCONTINUED] potassium chloride SA CR tablet 40 mEq    [DISCONTINUED] psyllium husk (with sugar) 3.4 gram packet 2 packet    [DISCONTINUED] sertraline tablet 50 mg    [DISCONTINUED] sodium chloride 0.9% flush 10 mL    [DISCONTINUED] sodium chloride 0.9% flush 10 mL    [DISCONTINUED] sodium phosphate 15 mmol in dextrose 5 % 250 mL IVPB    [DISCONTINUED] sodium phosphate 15 mmol in dextrose 5 % 250 mL IVPB    [DISCONTINUED] sodium phosphate 20.01 mmol in dextrose 5 % 250 mL IVPB    [DISCONTINUED] sodium phosphate 20.01 mmol in dextrose 5 % 250 mL IVPB    [DISCONTINUED] sodium phosphate 30 mmol in dextrose 5 % 250 mL IVPB    [DISCONTINUED] TPN ADULT CENTRAL LINE CUSTOM (3 in 1)    [DISCONTINUED] vancomycin - pharmacy to dose     Current Outpatient Medications on File Prior to Encounter   Medication Sig    calcitRIOL (ROCALTROL) 0.5 MCG Cap Take 1 capsule by mouth once daily.    cholestyramine-aspartame (QUESTRAN LIGHT) 4 gram PwPk Take 1 packet (4 g total) by mouth 2 (two) times daily.    dicyclomine (BENTYL) 10 MG capsule TAKE 1 CAPSULE THREE TIMES A DAY (Patient taking differently: Take 10 mg by mouth 3 (three) times daily. )    diphenhydrAMINE (BENADRYL) 25 mg capsule Take 25 mg by mouth nightly as needed for Itching.    dronabinol (MARINOL) 2.5 MG capsule Take 1 capsule (2.5 mg total) by mouth 2 (two) times daily before meals.    levothyroxine (SYNTHROID) 88 MCG tablet Take 1 tablet (88 mcg total) by mouth every morning.    loperamide (IMODIUM) 2 mg capsule Take 2 capsules (4 mg total) by mouth 4 (four) times daily as needed for Diarrhea.    magnesium oxide (MAG-OX) 400 mg (241.3 mg magnesium) tablet Take 400 mg by mouth 2 (two) times daily.    promethazine (PHENERGAN) 25 MG tablet Take 1 tablet (25 mg total) by mouth daily as needed for Nausea.    sertraline (ZOLOFT) 50 MG tablet Take 50 mg by mouth once daily.     sodium bicarbonate  650 MG tablet Take 650 mg by mouth 3 (three) times daily.      Family History     Problem Relation (Age of Onset)    Cancer Mother    Heart disease Mother, Father, Sister    Hypertension Mother    No Known Problems Daughter        Tobacco Use    Smoking status: Former Smoker     Packs/day: 1.00     Years: 33.00     Pack years: 33.00     Types: Cigarettes     Last attempt to quit: 2000     Years since quittin.6    Smokeless tobacco: Never Used   Substance and Sexual Activity    Alcohol use: No    Drug use: No    Sexual activity: Not on file     Review of Systems   Constitutional: Negative for appetite change, chills, diaphoresis, fatigue and fever.   Respiratory: Negative for cough, shortness of breath and wheezing.    Cardiovascular: Negative for chest pain and palpitations.   Gastrointestinal: Positive for diarrhea. Negative for abdominal pain, blood in stool, constipation, nausea and vomiting.   Genitourinary: Negative for dysuria, flank pain, frequency, hematuria, urgency, vaginal discharge and vaginal pain.   Skin: Negative for color change, pallor, rash and wound.   Neurological: Positive for weakness. Negative for dizziness, syncope, light-headedness, numbness and headaches.   Psychiatric/Behavioral: Negative for agitation, confusion and decreased concentration.     Objective:     Vital Signs (Most Recent):  Temp: 98 °F (36.7 °C) (20 0453)  Pulse: (!) 58 (20 0600)  Resp: 18 (20 0453)  BP: 130/67 (20 0453)  SpO2: 98 % (20 0453) Vital Signs (24h Range):  Temp:  [97.7 °F (36.5 °C)-98.8 °F (37.1 °C)] 98 °F (36.7 °C)  Pulse:  [56-62] 58  Resp:  [16-18] 18  SpO2:  [92 %-99 %] 98 %  BP: (103-169)/(61-74) 130/67     Weight: 53 kg (116 lb 13.5 oz)  Body mass index is 18.3 kg/m².    Physical Exam   Constitutional: She is oriented to person, place, and time. She appears well-developed and well-nourished. No distress.   Thin & somewhat frail appearing.    HENT:   Head:  Normocephalic and atraumatic.   Eyes: Pupils are equal, round, and reactive to light. Conjunctivae and EOM are normal. No scleral icterus.   Neck: Normal range of motion. Neck supple. No tracheal deviation present.   Cardiovascular: Normal rate, regular rhythm, normal heart sounds and intact distal pulses. Exam reveals no gallop and no friction rub.   No murmur heard.  Pulmonary/Chest: Effort normal and breath sounds normal. No stridor. No respiratory distress. She has no wheezes. She has no rales.   Abdominal: Soft. Bowel sounds are normal. She exhibits no distension. There is no tenderness. There is no guarding.   Ileostomy bag present in right side of abdomen.    Neurological: She is alert and oriented to person, place, and time. No cranial nerve deficit.   Skin: Skin is warm and dry. She is not diaphoretic.   Psychiatric: She has a normal mood and affect. Her behavior is normal. Judgment and thought content normal.   Nursing note and vitals reviewed.        CRANIAL NERVES     CN III, IV, VI   Pupils are equal, round, and reactive to light.  Extraocular motions are normal.        Significant Labs:   BMP:   Recent Labs   Lab 03/06/20 0432   *      K 3.5      CO2 30*   BUN 28*   CREATININE 2.1*   CALCIUM 7.7*   MG 2.3     CBC:   Recent Labs   Lab 03/05/20 0454 03/06/20 0432   WBC 3.60* 3.61*   HGB 7.5* 7.4*   HCT 24.5* 24.7*   PLT 87* 85*     CMP:   Recent Labs   Lab 03/05/20 0454 03/06/20 0432     141 143   K 3.9  3.9 3.5     108 107   CO2 31*  31* 30*     106 125*   BUN 30*  30* 28*   CREATININE 2.3*  2.3* 2.1*   CALCIUM 7.9*  7.9* 7.7*   PROT 4.3* 4.2*   ALBUMIN 2.4* 2.1*   BILITOT 1.3* 0.7   ALKPHOS 217* 223*   AST 22 21   ALT 15 14   ANIONGAP 2*  2* 6*   EGFRNONAA 21.1*  21.1* 23*     Urine Culture: No results for input(s): LABURIN in the last 48 hours.  Urine Studies: No results for input(s): COLORU, APPEARANCEUA, PHUR, SPECGRAV, PROTEINUA, GLUCUA,  KETONESU, BILIRUBINUA, OCCULTUA, NITRITE, UROBILINOGEN, LEUKOCYTESUR, RBCUA, WBCUA, BACTERIA, SQUAMEPITHEL, HYALINECASTS in the last 48 hours.    Invalid input(s): CATALINA  All pertinent labs within the past 24 hours have been reviewed.    Significant Imaging: I have reviewed all pertinent imaging results/findings within the past 24 hours.

## 2020-03-06 NOTE — ASSESSMENT & PLAN NOTE
- chronic   - states she has had this since chemo   - continue anti-diarrheal meds   - monitor I&Os

## 2020-03-06 NOTE — NURSING
Pt alert and oriented X 4 on room air.  Had Rt chest tunneled cuffed double lumen picc placed in cath lab, TPN connected at 55cc/hr in distal port and LR at 125cc/hr in second port.  RT triple lumen femoral line flushed and placed to saline lock. Dressing dry and intact.  Ileostomy on RLQ connected suction drainage is  watery brown stool. Godwin in place draing yellow urine. Seen by wound care RN, sacral dressing changed. Remains on clear liquid diet. Encouraged pt to call with any needs,

## 2020-03-06 NOTE — CONSULTS
Consult/H&P Note  Interventional Radiology    Consult Requested By: medicine    Reason for Consult: difficult access    SUBJECTIVE:     Chief Complaint: difficult access    History of Present Illness: 70 yo F with multiple medical complaints in need of reliable central venous access for TPN.  She has an iliostomy and dumping syndrome which necessitates TPN.  She also has CKD and has an AV fistula in the LUE, although she does not dialyze currently.  Unsuccessful placement of R neck line previously.  She currently has a R femoral line in place.    Past Medical History:   Diagnosis Date    Chronic kidney disease, stage III (moderate) 1/7/2018    Diabetes mellitus, type 2     Fatigue     Hypertension     Iron deficiency anemia due to chronic blood loss 1/7/2018    Vulvar cancer 1/7/2019     Past Surgical History:   Procedure Laterality Date    ABDOMINAL SURGERY      BACK SURGERY      CERVICAL FUSION      x 4    cervix repair      CHOLECYSTECTOMY  2000    DIAGNOSTIC LAPAROSCOPY N/A 2/13/2020    Procedure: LAPAROSCOPY, DIAGNOSTIC;  Surgeon: Robbi Lovell III, MD;  [facility];  Service: General;  Laterality: N/A;    HYSTERECTOMY  1980    ILEOSTOMY N/A 2/13/2020    Procedure: CREATION, ILEOSTOMY Loop;  Surgeon: Robbi Lovell III, MD;  Location: [facility] OR;  Service: General;  Laterality: N/A;    LUMBAR LAMINECTOMY      LYSIS OF ADHESIONS N/A 2/13/2020    Procedure: LYSIS, ADHESIONS;  Surgeon: Robbi Lovell III, MD;  Location: [facility] OR;  Service: General;  Laterality: N/A;    NECK SURGERY      PHLEBOGRAPHY  2/28/2020    Procedure: VENOGRAM;  Surgeon: Robbi Lovell III, MD;  Location: [facility] OR;  Service: General;;    REMOVAL OF VASCULAR ACCESS PORT Right 2/13/2020    Procedure: REMOVAL, VASCULAR ACCESS PORT;  Surgeon: Robbi Lovell III, MD;  Location: [facility] OR;  Service: General;  Laterality: Right;    SHOULDER SURGERY      vulva cancer       Family History   Problem Relation  Age of Onset    Heart disease Mother     Hypertension Mother     Cancer Mother         lung    Heart disease Father     Heart disease Sister         Open heart surgery    No Known Problems Daughter      Social History     Tobacco Use    Smoking status: Former Smoker     Packs/day: 1.00     Years: 33.00     Pack years: 33.00     Types: Cigarettes     Last attempt to quit: 2000     Years since quittin.6    Smokeless tobacco: Never Used   Substance Use Topics    Alcohol use: No    Drug use: No       Review of Systems:  Constitutional/General:No fever, chills  Hematological/Immuno: no known coagulopathies  Respiratory: no shortness of breath  Cardiovascular: no chest pain  Gastrointestinal: no abdominal pain  Genito-Urinary: no dysuria  Musculoskeletal: negative  Skin: Negative for rash, itching, pigmentation changes, nail or hair changes.  Neurological: no TIA or stroke symptoms  Psychiatric: normal mood/affect, good insight/judgement      OBJECTIVE:     Vital Signs Range (Last 24H):  Temp:  [97.7 °F (36.5 °C)-98.8 °F (37.1 °C)]   Pulse:  [56-65]   Resp:  [17-18]   BP: (103-169)/(57-74)   SpO2:  [92 %-99 %]     Physical Exam:  General- Patient alert and oriented x3 in NAD  ENT- PERRLA,  Neck- No masses  CV- Regular rate and rhythm  Resp-  No increased WOB  GI- Non tender/non-distended  Extrem- good thrill in LUE fistula, R femoral catheter.   Derm- No rashes, masses, or lesions noted  Neuro-  No focal deficits noted.     Physical Exam  Body mass index is 18.3 kg/m².    Scheduled Meds:    cholestyramine-aspartame  1 packet Oral BID    diphenhydrAMINE  6.25 mg Intravenous Q6H    dronabinol  2.5 mg Oral BID AC    levothyroxine  88 mcg Oral Before breakfast    loperamide  4 mg Oral QID    magnesium oxide  400 mg Oral BID    pantoprazole  40 mg Intravenous BID    psyllium husk (with sugar)  2 packet Oral TID    sertraline  50 mg Oral Daily     Continuous Infusions:    lactated ringers 125 mL/hr  at 03/06/20 0150    octreotide (SANDOSTATIN) infusion 25 mcg/hr (03/06/20 0208)    TPN ADULT CENTRAL LINE CUSTOM (3 in 1)      TPN ADULT CENTRAL LINE CUSTOM       PRN Meds:sodium chloride, acetaminophen, dextrose 50%, dextrose 50%, glucagon (human recombinant), glucose, glucose, HYDROcodone-acetaminophen, melatonin, ondansetron, sodium chloride 0.9%    Allergies:   Review of patient's allergies indicates:   Allergen Reactions    Ciprofloxacin Other (See Comments)     Elevated liver enzymes      Pcn [penicillins] Anaphylaxis       Labs:  No results for input(s): INR in the last 168 hours.    Invalid input(s):  PT,  PTT    Recent Labs   Lab 03/06/20  0432   WBC 3.61*   HGB 7.4*   HCT 24.7*   *   PLT 85*      Recent Labs   Lab 03/06/20  0432   *      K 3.5      CO2 30*   BUN 28*   CREATININE 2.1*   CALCIUM 7.7*   MG 2.3   ALT 14   AST 21   ALBUMIN 2.1*   BILITOT 0.7       Vitals (Most Recent):  Temp: 98.6 °F (37 °C) (03/06/20 0740)  Pulse: (!) 58 (03/06/20 1000)  Resp: 18 (03/06/20 0740)  BP: (!) 123/57 (03/06/20 0740)  SpO2: 98 % (03/06/20 0740)    ASA: 3  Mallampati: 2    Consent obtained    ASSESSMENT/PLAN:     Attempt R neck cathter placement under moderate sedation.  If unable to place R sided catheter, we would then move to the L neck.  Would prefer to avoid the R arm if possible to preserve future potential access.    Active Hospital Problems    Diagnosis  POA    *History of difficult venous access [Z87.898]  Yes    DNR (do not resuscitate) [Z66]  Yes    S/P ileostomy [Z93.2]  Not Applicable    Anemia of chronic disease [D63.8]  Yes     Chronic    Acute renal failure superimposed on stage 5 chronic kidney disease, not on chronic dialysis [N17.9, N18.5]  Yes    Diarrhea [R19.7]  Yes    Hypothyroidism [E03.9]  Yes     Chronic      Resolved Hospital Problems   No resolved problems to display.           Gallito Treviño MD

## 2020-03-06 NOTE — HPI
Ms. Shara Hutchins is a 69 y.o. 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 Upon admission to [facility], she was having high output via her ileostomy, and subsequent DARCI and dehydration. She was placed on TPN, but there was difficulty getting IV access, and thus was receiving the TPN via a right femoral CVC, which was limiting therapy participation. As a tunneled line was unable to be obtained, she was transferred for IR services at [facility].

## 2020-03-06 NOTE — NURSING
Transferred patient safely to bed, AAOx4, sating good in RA. Triple lumen R femoral line patent, dressing C/D/I. Restarted lipid, fluids and octreotide. Ileostomy noted on RLQ connected to suction, stoma pink in color no leakage on bag noted. Stoma output 300ml brown colored. Encouraged patient to turn to sides. Vitals WNL. Labs drawn. In no apparent distress.

## 2020-03-06 NOTE — PT/OT/SLP DISCHARGE
Occupational Therapy Discharge Summary    Shara Hutchins  MRN: [id number]   Principal Problem: Severe dehydration      Patient Discharged from acute Occupational Therapy on 3/5/2020.  Please refer to prior OT note dated 3/5/2020 for functional status.    Assessment:      [facility] [place]    Objective:     GOALS:   Multidisciplinary Problems     Occupational Therapy Goals     Not on file          Multidisciplinary Problems (Resolved)        Problem: Occupational Therapy Goal    Goal Priority Disciplines Outcome Interventions   Occupational Therapy Goal   (Resolved)     OT, PT/OT Met    Description:  Goals to be met by: discharge    Patient will increase functional independence with ADLs by performing:    Pt to be independent with UE HEP program of 3 x 10 reps each for reconditioning  UE Dressing with Supervision.  LE Dressing with Supervision.  Grooming while standing with Modified [place].  Toileting from toilet with Modified [place] for hygiene and clothing management.   Toilet transfer to toilet with Supervision.                       Reasons for Discontinuation of Therapy Services  Transfer to alternate level of care.      Plan:     Patient Discharged to: [facility] [place]    Dashawn Sutherland, OT  3/6/2020

## 2020-03-06 NOTE — PLAN OF CARE
Pt transferred to [facility] from [facility] for IR placement for PICC.    Procedure complete, plan is for transport back to [place] for continued care.    Transport arrangements being arranged thru transfer center.     03/06/20 1452   Final Note   Assessment Type Final Discharge Note   Anticipated Discharge Disposition [facility] [place]   What phone number can be called within the next 1-3 days to see how you are doing after discharge? [phone]   Hospital Follow Up  Appt(s) scheduled? Yes   Discharge plans and expectations educations in teach back method with documentation complete? Yes   Right Care Referral Info   Post Acute Recommendation Other

## 2020-03-06 NOTE — PLAN OF CARE
CM met with pt for initial discharge planning assessment. Pt transferred here from [facility] for IR access.  Pt confirms all demographic information on face sheet is correct.  Pt confirmed her PCP is correct, April Horton MD. Pharmacy of choice is     Pt current lives alone, but states she is in process at [place] of going to SNF, then assisted living. Per CM notes from [place], arrangements have been initiated for pt to go to [facility] in [place]. CM to confirm with facility.    Pt states she is to have cololstomy reversed in 3 months or so. TPN is temporary while with colostomy.    Pt denies need for DME at this time.    Pt is current with [facility], uses no DME at this time. Pt not on coumadin and not on HD.    CM to follow for plans and arrangements.     03/06/20 0818   Discharge Assessment   Assessment Type Discharge Planning Assessment   Confirmed/corrected address and phone number on facesheet? Yes   Assessment information obtained from? Patient;Medical Record   Expected Length of Stay (days) 2   Communicated expected length of stay with patient/caregiver yes   Prior to hospitilization cognitive status: Alert/Oriented   Prior to hospitalization functional status: Independent   Current cognitive status: Alert/Oriented   Current Functional Status: Independent;Needs Assistance   Facility Arrived From: [facility]   Lives With alone   Able to Return to Prior Arrangements other (see comments)  (pt states she will transfer back to [facility])   Is patient able to care for self after discharge? Unable to determine at this time (comments)   Patient's perception of discharge disposition home or selfcare   Readmission Within the Last 30 Days current reason for admission unrelated to previous admission  (Pt transferred in to [facility] for access)   Patient currently being followed by outpatient case management? No   Patient currently receives any other outside agency services? No   Equipment Currently  Used at Home none   Do you have any problems affording any of your prescribed medications? No   Is the patient taking medications as prescribed? yes   Does the patient have transportation home? Yes   Transportation Anticipated family or friend will provide   Does the patient receive services at the [facility]madin Clinic? No   Patient/Family in Agreement with Plan yes

## 2020-03-07 ENCOUNTER — HOSPITAL ENCOUNTER (INPATIENT)
Facility: HOSPITAL | Age: 70
LOS: 18 days | Discharge: NURSING, REHAB OR LONG-TERM CARE | DRG: 640 | End: 2020-03-27
Attending: INTERNAL MEDICINE | Admitting: INTERNAL MEDICINE
Payer: MEDICARE

## 2020-03-07 VITALS
RESPIRATION RATE: 14 BRPM | BODY MASS INDEX: 18.35 KG/M2 | OXYGEN SATURATION: 98 % | DIASTOLIC BLOOD PRESSURE: 61 MMHG | TEMPERATURE: 98 F | SYSTOLIC BLOOD PRESSURE: 131 MMHG | HEIGHT: 67 IN | WEIGHT: 116.88 LBS | HEART RATE: 57 BPM

## 2020-03-07 DIAGNOSIS — J96.01 ACUTE HYPOXEMIC RESPIRATORY FAILURE: ICD-10-CM

## 2020-03-07 DIAGNOSIS — R07.9 CHEST PAIN: ICD-10-CM

## 2020-03-07 DIAGNOSIS — K52.9 CHRONIC DIARRHEA: Chronic | ICD-10-CM

## 2020-03-07 DIAGNOSIS — R31.0 GROSS HEMATURIA: Primary | ICD-10-CM

## 2020-03-07 DIAGNOSIS — Z93.2 S/P ILEOSTOMY: ICD-10-CM

## 2020-03-07 DIAGNOSIS — R06.02 SOB (SHORTNESS OF BREATH): ICD-10-CM

## 2020-03-07 DIAGNOSIS — E46 MALNUTRITION: ICD-10-CM

## 2020-03-07 DIAGNOSIS — I48.91 ATRIAL FIBRILLATION: ICD-10-CM

## 2020-03-07 PROBLEM — R53.81 DEBILITY: Status: ACTIVE | Noted: 2020-03-07

## 2020-03-07 PROBLEM — D69.6 THROMBOCYTOPENIA, UNSPECIFIED: Status: ACTIVE | Noted: 2020-03-07

## 2020-03-07 LAB
ALBUMIN SERPL BCP-MCNC: 2.2 G/DL (ref 3.5–5.2)
ALP SERPL-CCNC: 220 U/L (ref 55–135)
ALT SERPL W/O P-5'-P-CCNC: 16 U/L (ref 10–44)
ANION GAP SERPL CALC-SCNC: 8 MMOL/L (ref 8–16)
AST SERPL-CCNC: 20 U/L (ref 10–40)
BASOPHILS # BLD AUTO: 0.02 K/UL (ref 0–0.2)
BASOPHILS NFR BLD: 0.5 % (ref 0–1.9)
BILIRUB SERPL-MCNC: 0.8 MG/DL (ref 0.1–1)
BUN SERPL-MCNC: 27 MG/DL (ref 8–23)
CALCIUM SERPL-MCNC: 7.9 MG/DL (ref 8.7–10.5)
CHLORIDE SERPL-SCNC: 109 MMOL/L (ref 95–110)
CO2 SERPL-SCNC: 27 MMOL/L (ref 23–29)
CREAT SERPL-MCNC: 1.8 MG/DL (ref 0.5–1.4)
DIFFERENTIAL METHOD: ABNORMAL
EOSINOPHIL # BLD AUTO: 0.3 K/UL (ref 0–0.5)
EOSINOPHIL NFR BLD: 6.6 % (ref 0–8)
ERYTHROCYTE [DISTWIDTH] IN BLOOD BY AUTOMATED COUNT: 16.1 % (ref 11.5–14.5)
EST. GFR  (AFRICAN AMERICAN): 33 ML/MIN/1.73 M^2
EST. GFR  (NON AFRICAN AMERICAN): 28 ML/MIN/1.73 M^2
GLUCOSE SERPL-MCNC: 68 MG/DL (ref 70–110)
HCT VFR BLD AUTO: 25.4 % (ref 37–48.5)
HGB BLD-MCNC: 7.8 G/DL (ref 12–16)
IMM GRANULOCYTES # BLD AUTO: 0.02 K/UL (ref 0–0.04)
IMM GRANULOCYTES NFR BLD AUTO: 0.5 % (ref 0–0.5)
LYMPHOCYTES # BLD AUTO: 1.4 K/UL (ref 1–4.8)
LYMPHOCYTES NFR BLD: 32.9 % (ref 18–48)
MAGNESIUM SERPL-MCNC: 2.1 MG/DL (ref 1.6–2.6)
MCH RBC QN AUTO: 31.3 PG (ref 27–31)
MCHC RBC AUTO-ENTMCNC: 30.7 G/DL (ref 32–36)
MCV RBC AUTO: 102 FL (ref 82–98)
MONOCYTES # BLD AUTO: 0.4 K/UL (ref 0.3–1)
MONOCYTES NFR BLD: 10.1 % (ref 4–15)
NEUTROPHILS # BLD AUTO: 2.1 K/UL (ref 1.8–7.7)
NEUTROPHILS NFR BLD: 49.4 % (ref 38–73)
NRBC BLD-RTO: 0 /100 WBC
PHOSPHATE SERPL-MCNC: 2.6 MG/DL (ref 2.7–4.5)
PLATELET # BLD AUTO: 100 K/UL (ref 150–350)
PMV BLD AUTO: 12.7 FL (ref 9.2–12.9)
POTASSIUM SERPL-SCNC: 4 MMOL/L (ref 3.5–5.1)
PROT SERPL-MCNC: 4.3 G/DL (ref 6–8.4)
RBC # BLD AUTO: 2.49 M/UL (ref 4–5.4)
SODIUM SERPL-SCNC: 144 MMOL/L (ref 136–145)
WBC # BLD AUTO: 4.25 K/UL (ref 3.9–12.7)

## 2020-03-07 PROCEDURE — 63600175 PHARM REV CODE 636 W HCPCS: Performed by: PHYSICIAN ASSISTANT

## 2020-03-07 PROCEDURE — 96376 TX/PRO/DX INJ SAME DRUG ADON: CPT

## 2020-03-07 PROCEDURE — G0379 DIRECT REFER HOSPITAL OBSERV: HCPCS

## 2020-03-07 PROCEDURE — C9113 INJ PANTOPRAZOLE SODIUM, VIA: HCPCS | Performed by: PHYSICIAN ASSISTANT

## 2020-03-07 PROCEDURE — 63600175 PHARM REV CODE 636 W HCPCS: Performed by: HOSPITALIST

## 2020-03-07 PROCEDURE — G0378 HOSPITAL OBSERVATION PER HR: HCPCS

## 2020-03-07 PROCEDURE — 84100 ASSAY OF PHOSPHORUS: CPT

## 2020-03-07 PROCEDURE — 25000003 PHARM REV CODE 250: Performed by: INTERNAL MEDICINE

## 2020-03-07 PROCEDURE — 99239 HOSP IP/OBS DSCHRG MGMT >30: CPT | Mod: ,,, | Performed by: HOSPITALIST

## 2020-03-07 PROCEDURE — 25000003 PHARM REV CODE 250: Performed by: PHYSICIAN ASSISTANT

## 2020-03-07 PROCEDURE — 99239 PR HOSPITAL DISCHARGE DAY,>30 MIN: ICD-10-PCS | Mod: ,,, | Performed by: HOSPITALIST

## 2020-03-07 PROCEDURE — 85025 COMPLETE CBC W/AUTO DIFF WBC: CPT

## 2020-03-07 PROCEDURE — 25000003 PHARM REV CODE 250: Performed by: HOSPITALIST

## 2020-03-07 PROCEDURE — 80053 COMPREHEN METABOLIC PANEL: CPT

## 2020-03-07 PROCEDURE — 83735 ASSAY OF MAGNESIUM: CPT

## 2020-03-07 RX ORDER — PANTOPRAZOLE SODIUM 40 MG/10ML
40 INJECTION, POWDER, LYOPHILIZED, FOR SOLUTION INTRAVENOUS 2 TIMES DAILY
Status: DISCONTINUED | OUTPATIENT
Start: 2020-03-07 | End: 2020-03-25

## 2020-03-07 RX ORDER — IBUPROFEN 200 MG
24 TABLET ORAL
Status: DISCONTINUED | OUTPATIENT
Start: 2020-03-07 | End: 2020-03-27 | Stop reason: HOSPADM

## 2020-03-07 RX ORDER — TALC
9 POWDER (GRAM) TOPICAL NIGHTLY PRN
Status: DISCONTINUED | OUTPATIENT
Start: 2020-03-07 | End: 2020-03-27 | Stop reason: HOSPADM

## 2020-03-07 RX ORDER — SODIUM CHLORIDE 0.9 % (FLUSH) 0.9 %
10 SYRINGE (ML) INJECTION EVERY 6 HOURS PRN
Status: DISCONTINUED | OUTPATIENT
Start: 2020-03-07 | End: 2020-03-27 | Stop reason: HOSPADM

## 2020-03-07 RX ORDER — IBUPROFEN 200 MG
16 TABLET ORAL
Status: DISCONTINUED | OUTPATIENT
Start: 2020-03-07 | End: 2020-03-27 | Stop reason: HOSPADM

## 2020-03-07 RX ORDER — LOPERAMIDE HYDROCHLORIDE 2 MG/1
4 CAPSULE ORAL 4 TIMES DAILY
Status: DISCONTINUED | OUTPATIENT
Start: 2020-03-07 | End: 2020-03-24

## 2020-03-07 RX ORDER — DRONABINOL 2.5 MG/1
2.5 CAPSULE ORAL
Status: DISCONTINUED | OUTPATIENT
Start: 2020-03-08 | End: 2020-03-27 | Stop reason: HOSPADM

## 2020-03-07 RX ORDER — SODIUM CHLORIDE, SODIUM LACTATE, POTASSIUM CHLORIDE, CALCIUM CHLORIDE 600; 310; 30; 20 MG/100ML; MG/100ML; MG/100ML; MG/100ML
INJECTION, SOLUTION INTRAVENOUS CONTINUOUS
Status: DISCONTINUED | OUTPATIENT
Start: 2020-03-07 | End: 2020-03-16

## 2020-03-07 RX ORDER — DIPHENOXYLATE HYDROCHLORIDE AND ATROPINE SULFATE 2.5; .025 MG/1; MG/1
1 TABLET ORAL 4 TIMES DAILY
Status: DISCONTINUED | OUTPATIENT
Start: 2020-03-07 | End: 2020-03-07 | Stop reason: HOSPADM

## 2020-03-07 RX ORDER — OXYCODONE AND ACETAMINOPHEN 5; 325 MG/1; MG/1
1 TABLET ORAL EVERY 4 HOURS PRN
Status: DISCONTINUED | OUTPATIENT
Start: 2020-03-07 | End: 2020-03-07 | Stop reason: HOSPADM

## 2020-03-07 RX ORDER — ACETAMINOPHEN 325 MG/1
650 TABLET ORAL EVERY 4 HOURS PRN
Status: DISCONTINUED | OUTPATIENT
Start: 2020-03-07 | End: 2020-03-27 | Stop reason: HOSPADM

## 2020-03-07 RX ORDER — ONDANSETRON 4 MG/1
8 TABLET, ORALLY DISINTEGRATING ORAL EVERY 8 HOURS PRN
Status: DISCONTINUED | OUTPATIENT
Start: 2020-03-07 | End: 2020-03-27 | Stop reason: HOSPADM

## 2020-03-07 RX ORDER — OXYCODONE AND ACETAMINOPHEN 5; 325 MG/1; MG/1
1 TABLET ORAL EVERY 4 HOURS PRN
Refills: 0 | Status: ON HOLD
Start: 2020-03-07 | End: 2020-05-22 | Stop reason: HOSPADM

## 2020-03-07 RX ORDER — GLUCAGON 1 MG
1 KIT INJECTION
Status: DISCONTINUED | OUTPATIENT
Start: 2020-03-07 | End: 2020-03-27 | Stop reason: HOSPADM

## 2020-03-07 RX ORDER — CHOLESTYRAMINE 4 G/4.8G
1 POWDER, FOR SUSPENSION ORAL 2 TIMES DAILY
Status: DISCONTINUED | OUTPATIENT
Start: 2020-03-07 | End: 2020-03-13

## 2020-03-07 RX ORDER — SERTRALINE HYDROCHLORIDE 50 MG/1
50 TABLET, FILM COATED ORAL DAILY
Status: DISCONTINUED | OUTPATIENT
Start: 2020-03-08 | End: 2020-03-27 | Stop reason: HOSPADM

## 2020-03-07 RX ORDER — HYDROCODONE BITARTRATE AND ACETAMINOPHEN 5; 325 MG/1; MG/1
1 TABLET ORAL EVERY 6 HOURS PRN
Status: DISCONTINUED | OUTPATIENT
Start: 2020-03-07 | End: 2020-03-25

## 2020-03-07 RX ORDER — HYDROCODONE BITARTRATE AND ACETAMINOPHEN 500; 5 MG/1; MG/1
TABLET ORAL
Status: DISCONTINUED | OUTPATIENT
Start: 2020-03-07 | End: 2020-03-19

## 2020-03-07 RX ORDER — LANOLIN ALCOHOL/MO/W.PET/CERES
400 CREAM (GRAM) TOPICAL 2 TIMES DAILY
Status: DISCONTINUED | OUTPATIENT
Start: 2020-03-07 | End: 2020-03-27 | Stop reason: HOSPADM

## 2020-03-07 RX ORDER — DIPHENHYDRAMINE HYDROCHLORIDE 50 MG/ML
6.25 INJECTION INTRAMUSCULAR; INTRAVENOUS EVERY 6 HOURS
Status: DISCONTINUED | OUTPATIENT
Start: 2020-03-08 | End: 2020-03-24

## 2020-03-07 RX ORDER — LEVOTHYROXINE SODIUM 88 UG/1
88 TABLET ORAL
Status: DISCONTINUED | OUTPATIENT
Start: 2020-03-08 | End: 2020-03-27 | Stop reason: HOSPADM

## 2020-03-07 RX ADMIN — DRONABINOL 2.5 MG: 2.5 CAPSULE ORAL at 09:03

## 2020-03-07 RX ADMIN — ACETAMINOPHEN 650 MG: 325 TABLET ORAL at 09:03

## 2020-03-07 RX ADMIN — HYDROCODONE BITARTRATE AND ACETAMINOPHEN 1 TABLET: 5; 325 TABLET ORAL at 10:03

## 2020-03-07 RX ADMIN — DIPHENHYDRAMINE HYDROCHLORIDE 6.25 MG: 50 INJECTION, SOLUTION INTRAMUSCULAR; INTRAVENOUS at 06:03

## 2020-03-07 RX ADMIN — PSYLLIUM HUSK 2 PACKET: 3.4 POWDER ORAL at 03:03

## 2020-03-07 RX ADMIN — SERTRALINE HYDROCHLORIDE 50 MG: 50 TABLET ORAL at 09:03

## 2020-03-07 RX ADMIN — OXYCODONE HYDROCHLORIDE AND ACETAMINOPHEN 1 TABLET: 5; 325 TABLET ORAL at 01:03

## 2020-03-07 RX ADMIN — SODIUM CHLORIDE, SODIUM LACTATE, POTASSIUM CHLORIDE, AND CALCIUM CHLORIDE: .6; .31; .03; .02 INJECTION, SOLUTION INTRAVENOUS at 01:03

## 2020-03-07 RX ADMIN — OXYCODONE HYDROCHLORIDE AND ACETAMINOPHEN 1 TABLET: 5; 325 TABLET ORAL at 05:03

## 2020-03-07 RX ADMIN — SODIUM CHLORIDE, SODIUM LACTATE, POTASSIUM CHLORIDE, AND CALCIUM CHLORIDE: .6; .31; .03; .02 INJECTION, SOLUTION INTRAVENOUS at 11:03

## 2020-03-07 RX ADMIN — DIPHENOXYLATE HYDROCHLORIDE AND ATROPINE SULFATE 1 TABLET: 2.5; .025 TABLET ORAL at 12:03

## 2020-03-07 RX ADMIN — LOPERAMIDE HYDROCHLORIDE 4 MG: 2 CAPSULE ORAL at 12:03

## 2020-03-07 RX ADMIN — DRONABINOL 2.5 MG: 2.5 CAPSULE ORAL at 06:03

## 2020-03-07 RX ADMIN — DIPHENHYDRAMINE HYDROCHLORIDE 6.25 MG: 50 INJECTION INTRAMUSCULAR; INTRAVENOUS at 11:03

## 2020-03-07 RX ADMIN — SODIUM CHLORIDE, SODIUM LACTATE, POTASSIUM CHLORIDE, AND CALCIUM CHLORIDE: .6; .31; .03; .02 INJECTION, SOLUTION INTRAVENOUS at 12:03

## 2020-03-07 RX ADMIN — LEVOTHYROXINE SODIUM 88 MCG: 88 TABLET ORAL at 05:03

## 2020-03-07 RX ADMIN — DIPHENHYDRAMINE HYDROCHLORIDE 6.25 MG: 50 INJECTION, SOLUTION INTRAMUSCULAR; INTRAVENOUS at 05:03

## 2020-03-07 RX ADMIN — OXYCODONE HYDROCHLORIDE AND ACETAMINOPHEN 1 TABLET: 5; 325 TABLET ORAL at 09:03

## 2020-03-07 RX ADMIN — Medication 400 MG: at 09:03

## 2020-03-07 RX ADMIN — LOPERAMIDE HYDROCHLORIDE 4 MG: 2 CAPSULE ORAL at 05:03

## 2020-03-07 RX ADMIN — DIPHENHYDRAMINE HYDROCHLORIDE 6.25 MG: 50 INJECTION, SOLUTION INTRAMUSCULAR; INTRAVENOUS at 12:03

## 2020-03-07 RX ADMIN — CHOLESTYRAMINE 4 G: 4 POWDER, FOR SUSPENSION ORAL at 09:03

## 2020-03-07 RX ADMIN — PSYLLIUM HUSK 2 PACKET: 3.4 POWDER ORAL at 09:03

## 2020-03-07 RX ADMIN — LOPERAMIDE HYDROCHLORIDE 4 MG: 2 CAPSULE ORAL at 09:03

## 2020-03-07 RX ADMIN — OCTREOTIDE ACETATE 25 MCG/HR: 500 INJECTION, SOLUTION INTRAVENOUS; SUBCUTANEOUS at 11:03

## 2020-03-07 RX ADMIN — PANTOPRAZOLE SODIUM 40 MG: 40 INJECTION, POWDER, LYOPHILIZED, FOR SOLUTION INTRAVENOUS at 09:03

## 2020-03-07 RX ADMIN — DIPHENOXYLATE HYDROCHLORIDE AND ATROPINE SULFATE 1 TABLET: 2.5; .025 TABLET ORAL at 05:03

## 2020-03-07 NOTE — ASSESSMENT & PLAN NOTE
"-Patient recently discharged (2/17) from the [facility] after ileostomy formation for the concern of pneumatosis intestinalis. Since the discharge she had constant output from the ileostomy bag (around 1 bag every 30 minutes).  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 Family felt that her chronic diarrhea worsened after the surgery to that extent that she is now unable to keep any thing in the stomach.  -She was admitted to [facility] for treatment of this.  She was treated with IV fluids, octreotide drip, anti-diarrheals and TPN.  The plan was to continue with minimal PO intake and TPN until ostomy reversed   -Per her surgeon Dr. Burnett, "Will plan to close the ostomy at soon as possible date.  Usual recommendation is 12 weeks from creation of ostomy.  Will most likely push that 8 weeks or maybe 6 weeks and her case."  -Ostomy functioning well, no surrounding infection or evidence of leakage   -Ileostomy care ordered   -Stopped octreotide yesterday.  -Continue imodium and cholestyramine.  Add lomtil  -Continue TPN.  -Patient wishes to attempt diet today, so will do so.  On admit however oral intake resulted in undigested food coming out ostomy so this may not be helpful.  -Plan is for SNF with TPN until ostomy reversal.    "

## 2020-03-07 NOTE — PROGRESS NOTES
[facility] Hospital Medicine  Progress Note    Patient Name: Shara Hutchins  MRN: [id number]  Patient Class: IP- Inpatient   Admission Date: 3/6/2020  Length of Stay: 1 days  Attending Physician: Clifford Allen MD  Primary Care Provider: April Horton MD        Subjective:     Principal Problem:History of difficult venous access        HPI:  Ms. Shara Hutchins is a 69 y.o. female, with PMH of HTN, NIDDM-2, CKD-3, vulvar cancer (s/p chemo/radiation), and now s/p ileostomy after pneumatosis intestinalis earlier in 2020 and with persistent diarrhea 2/2 chemotherapy, who is transferred from [facility] for IR placement of CVC. Upon admission to [facility], she was having high output via her ileostomy, and subsequent DARCI and dehydration. She was placed on TPN, but there was difficulty getting IV access, and thus was receiving the TPN via a right femoral CVC, which was limiting therapy participation. As a tunneled line was unable to be obtained, she was transferred for IR services at [facility].       Overview/Hospital Course:  No notes on file    Interval History: No acute events overnight.  Notes some mild right shoulder discomfort.  Looks perky and in good spirits.  All questions answered.    Review of Systems   Constitutional: Negative for appetite change, chills, diaphoresis, fatigue and fever.   Respiratory: Negative for cough, shortness of breath and wheezing.    Cardiovascular: Negative for chest pain and palpitations.   Gastrointestinal: Positive for diarrhea. Negative for abdominal pain, blood in stool, constipation, nausea and vomiting.   Genitourinary: Negative for dysuria, flank pain, frequency, hematuria, urgency, vaginal discharge and vaginal pain.   Skin: Negative for color change, pallor, rash and wound.   Neurological: Positive for weakness. Negative for dizziness, syncope, light-headedness, numbness and headaches.   Psychiatric/Behavioral: Negative for agitation, confusion  and decreased concentration.     Objective:     Vital Signs (Most Recent):  Temp: 98.1 °F (36.7 °C) (03/07/20 1319)  Pulse: 66 (03/07/20 1319)  Resp: 18 (03/07/20 1319)  BP: (!) 148/69 (03/07/20 1319)  SpO2: 99 % (03/07/20 1319) Vital Signs (24h Range):  Temp:  [97.8 °F (36.6 °C)-99.1 °F (37.3 °C)] 98.1 °F (36.7 °C)  Pulse:  [55-66] 66  Resp:  [12-20] 18  SpO2:  [97 %-99 %] 99 %  BP: (104-148)/(56-69) 148/69     Weight: 53 kg (116 lb 13.5 oz)  Body mass index is 18.3 kg/m².    Intake/Output Summary (Last 24 hours) at 3/7/2020 1330  Last data filed at 3/7/2020 0900  Gross per 24 hour   Intake 4765 ml   Output 5200 ml   Net -435 ml      Physical Exam   Constitutional: She is oriented to person, place, and time. She appears well-developed and well-nourished. No distress.   Thin & somewhat frail appearing.    HENT:   Head: Normocephalic and atraumatic.   Eyes: Pupils are equal, round, and reactive to light. Conjunctivae and EOM are normal. No scleral icterus.   Neck: Normal range of motion. Neck supple. No tracheal deviation present.   Cardiovascular: Normal rate, regular rhythm, normal heart sounds and intact distal pulses. Exam reveals no gallop and no friction rub.   No murmur heard.  Right chest tunneled picc line, left groin catheter (does not appear tunneled).   Pulmonary/Chest: Effort normal and breath sounds normal. No stridor. No respiratory distress. She has no wheezes. She has no rales.   Abdominal: Soft. Bowel sounds are normal. She exhibits no distension. There is no tenderness. There is no guarding.   Ileostomy bag present in right side of abdomen.    Neurological: She is alert and oriented to person, place, and time. No cranial nerve deficit.   Skin: Skin is warm and dry. She is not diaphoretic.   Psychiatric: She has a normal mood and affect. Her behavior is normal. Judgment and thought content normal.   Nursing note and vitals reviewed.      Significant Labs: All pertinent labs within the past 24 hours  "have been reviewed.    Significant Imaging: I have reviewed and interpreted all pertinent imaging results/findings within the past 24 hours.      Assessment/Plan:      * History of difficult venous access  - Ms. Shara Hutchins was admitted to inpatient status   - she presents from [facility] for venous access placement 2/2 difficulty obtaining venous access   - she reports LUE unable to be used for IV access  - RUE difficulty to obtain access in given overuse   - IR consulted and have placed a dual port tunneled PICC line which is functioning well.  - Need to remove left femoral line.  Nurses hesitant to remove as they believe it is tunneled.  Does not appear so to me.  The hospitalist at [facility] does not know if it was tunelled or not.  Will consult general surgery for removal.  - She is medically cleared to return to [facility] since 3/6 once a bed is available.      S/P ileostomy  -Patient recently discharged (2/17) from the [facility] after ileostomy formation for the concern of pneumatosis intestinalis. Since the discharge she had constant output from the ileostomy bag (around 1 bag every 30 minutes).  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 Family felt that her chronic diarrhea worsened after the surgery to that extent that she is now unable to keep any thing in the stomach.  -She was admitted to [facility] for treatment of this.  She was treated with IV fluids, octreotide drip, anti-diarrheals and TPN.  The plan was to continue with minimal PO intake and TPN until ostomy reversed   -Per her surgeon Dr. Burnett, "Will plan to close the ostomy at soon as possible date.  Usual recommendation is 12 weeks from creation of ostomy.  Will most likely push that 8 weeks or maybe 6 weeks and her case."  -Ostomy functioning well, no surrounding infection or evidence of leakage   -Ileostomy care " ordered   -Stopped octreotide yesterday.  -Continue imodium and cholestyramine.  Add lomtil  -Continue TPN.  -Patient wishes to attempt diet today, so will do so.  On admit however oral intake resulted in undigested food coming out ostomy so this may not be helpful.  -Plan is for SNF with TPN until surgery.      Acute renal failure superimposed on stage 5 chronic kidney disease, not on chronic dialysis  -improving with Cr of 1.8 today   -avoid nephrotoxins   -renally dose meds   -continue IV fluids and TPN  -Repeat BMP in AM      Thrombocytopenia, unspecified  -Present since at least 3/1/2020  -Has improved 72 hours  -Continue to monitor closely.      Debility  -Consult PT/OT  -Needs SNF vs IPR at discharge      Unstageable pressure injury of skin and tissue  -Wound care consulted      DNR (do not resuscitate)  -noted and code status updated       Diarrhea  -Chronic since chemo for vulvar cancer 5 years ago.  -Worse after iliostomy last month.  -Treatment as above.    Anemia of chronic disease  - appears stable   - no current/ongoing losses   - monitor       Hypothyroidism  -continue synthroid        VTE Risk Mitigation (From admission, onward)    None                Clifford Allen MD  Department of Hospital Medicine   [facility]

## 2020-03-07 NOTE — ASSESSMENT & PLAN NOTE
-Ms. Shara Hutchins was admitted to inpatient status   -She presented from [facility] for venous access placement 2/2 difficulty obtaining venous access   -She reports LUE unable to be used for IV access   RUE difficulty to obtain access in given overuse   -IR consulted and have placed a dual port tunneled PICC line which is functioning well.  -Need to remove left femoral line today now that she has more definitive access.  Will remove prior to discharge.  -She is medically cleared to return to [facility] since 3/6.  -Notified at 2:40PM that bed was available.  Will transfer today.

## 2020-03-07 NOTE — ASSESSMENT & PLAN NOTE
-improving with Cr of 1.8 today   -avoid nephrotoxins   -renally dose meds   -continue IV fluids and TPN  -Repeat BMP in AM

## 2020-03-07 NOTE — NURSING
Pt placed supine.  Catheter dressing removed. Sutures removed. The catheter was withdrawn by pulling it parallel to the skin in a constant, steady motion.  As the catheter exited the site, firm pressure was applied with a dry sterile gauze until hemostasis was achieved.  7 [place] TLC 20 cm removed.  Catheter tip and length examined.  No signs of damage.  No signs of a catheter embolism including palpitations, shortness of breath, cough, and chest pain.  Sterile petroleum-based ointment placed to the catheter exit site and covered with a sterile occlusive dressing. Dressing to remain in place for at least 24 hours. Pt instructed to remain flat next 30 minutes.  Will continue to monitor.

## 2020-03-07 NOTE — PROGRESS NOTES
Transfer center called to report [facility] is still working on freeing a bed for pt.    RN assisted pt on emptying ileostomy bag post lunch.  Regular diet lunch caused consistency of fecal matter to change and clog tube.  Output and flush used for tube recorded in flowsheets.

## 2020-03-07 NOTE — PLAN OF CARE
Called [facility] [phone] (house supervisor) , currently still no bed availability.  Plan to contact transport center should bed become avail.    Plan B discharge plan home w/ home health /TPN; Per SALEEM Allen

## 2020-03-07 NOTE — NURSING
Purposeful rounding completed, pt free of falls or injury, bed lowered and in lock position, call light in reach.

## 2020-03-07 NOTE — ASSESSMENT & PLAN NOTE
- Ms. Shara Hutchins was admitted to inpatient status   - she presents from [facility] for venous access placement 2/2 difficulty obtaining venous access   - she reports LUE unable to be used for IV access  - RUE difficulty to obtain access in given overuse   - IR consulted and have placed a dual port tunneled PICC line which is functioning well.  - Need to remove left femoral line.  Nurses hesitant to remove as they believe it is tunneled.  Does not appear so to me.  The hospitalist at [facility] does not know if it was tunelled or not.  Will consult general surgery for removal.  - She is medically cleared to return to [facility] since 3/6 once a bed is available.

## 2020-03-07 NOTE — SUBJECTIVE & OBJECTIVE
Interval History: No acute events overnight.  Notes some mild right shoulder discomfort.  Looks perky and in good spirits.  All questions answered.    Review of Systems   Constitutional: Negative for appetite change, chills, diaphoresis, fatigue and fever.   Respiratory: Negative for cough, shortness of breath and wheezing.    Cardiovascular: Negative for chest pain and palpitations.   Gastrointestinal: Positive for diarrhea. Negative for abdominal pain, blood in stool, constipation, nausea and vomiting.   Genitourinary: Negative for dysuria, flank pain, frequency, hematuria, urgency, vaginal discharge and vaginal pain.   Skin: Negative for color change, pallor, rash and wound.   Neurological: Positive for weakness. Negative for dizziness, syncope, light-headedness, numbness and headaches.   Psychiatric/Behavioral: Negative for agitation, confusion and decreased concentration.     Objective:     Vital Signs (Most Recent):  Temp: 98.1 °F (36.7 °C) (03/07/20 1319)  Pulse: 66 (03/07/20 1319)  Resp: 18 (03/07/20 1319)  BP: (!) 148/69 (03/07/20 1319)  SpO2: 99 % (03/07/20 1319) Vital Signs (24h Range):  Temp:  [97.8 °F (36.6 °C)-99.1 °F (37.3 °C)] 98.1 °F (36.7 °C)  Pulse:  [55-66] 66  Resp:  [12-20] 18  SpO2:  [97 %-99 %] 99 %  BP: (104-148)/(56-69) 148/69     Weight: 53 kg (116 lb 13.5 oz)  Body mass index is 18.3 kg/m².    Intake/Output Summary (Last 24 hours) at 3/7/2020 1330  Last data filed at 3/7/2020 0900  Gross per 24 hour   Intake 4765 ml   Output 5200 ml   Net -435 ml      Physical Exam   Constitutional: She is oriented to person, place, and time. She appears well-developed and well-nourished. No distress.   Thin & somewhat frail appearing.    HENT:   Head: Normocephalic and atraumatic.   Eyes: Pupils are equal, round, and reactive to light. Conjunctivae and EOM are normal. No scleral icterus.   Neck: Normal range of motion. Neck supple. No tracheal deviation present.   Cardiovascular: Normal rate, regular  rhythm, normal heart sounds and intact distal pulses. Exam reveals no gallop and no friction rub.   No murmur heard.  Right chest tunneled picc line, left groin catheter (does not appear tunneled).   Pulmonary/Chest: Effort normal and breath sounds normal. No stridor. No respiratory distress. She has no wheezes. She has no rales.   Abdominal: Soft. Bowel sounds are normal. She exhibits no distension. There is no tenderness. There is no guarding.   Ileostomy bag present in right side of abdomen.    Neurological: She is alert and oriented to person, place, and time. No cranial nerve deficit.   Skin: Skin is warm and dry. She is not diaphoretic.   Psychiatric: She has a normal mood and affect. Her behavior is normal. Judgment and thought content normal.   Nursing note and vitals reviewed.      Significant Labs: All pertinent labs within the past 24 hours have been reviewed.    Significant Imaging: I have reviewed and interpreted all pertinent imaging results/findings within the past 24 hours.

## 2020-03-07 NOTE — PT/OT/SLP PROGRESS
Physical Therapy  Not Seen    Patient Name:  Shara Hutchins   MRN:  [id number]    Patient not seen today secondary to Unavailable: Pt transferring back to [facility] this PM. PT evaluate and treat orders placed this AM at 1008. Will follow-up tomorrow if transfer does not occur as planned.    Sharmaine England, PT, DPT

## 2020-03-07 NOTE — DISCHARGE SUMMARY
[facility] Hospital Medicine  Discharge Summary      Patient Name: Shara Hutchins  MRN: [id number]  Admission Date: 3/6/2020  Hospital Length of Stay: 1 days  Discharge Date and Time:  03/07/2020 2:43 PM  Attending Physician: Nicole Allen MD   Discharging Provider: Nicole Allen MD  Primary Care Provider: April Horton MD      HPI:   Ms. Shara Hutchins is a 69 y.o. female, with PMH of HTN, NIDDM-2, CKD-3, vulvar cancer (s/p chemo/radiation), and now s/p ileostomy after pneumatosis intestinalis earlier in 2020 and with persistent diarrhea 2/2 chemotherapy, who is transferred from [facility] for IR placement of CVC. Upon admission to [facility], she was having high output via her ileostomy, and subsequent DARCI and dehydration. She was placed on TPN, but there was difficulty getting IV access, and thus was receiving the TPN via a right femoral CVC, which was limiting therapy participation. As a tunneled line was unable to be obtained, she was transferred for IR services at [facility].     Procedure(s) (LRB):  Insertion, Picc, Age 5 Years Or Older (Right)      Consults:   Consults (From admission, onward)        Status Ordering Provider     Inpatient consult to Interventional Radiology  Once     Provider:  Gallito Treviño MD    Completed OTTO LEYVA     Inpatient consult to Registered Dietitian/Nutritionist  Once     Provider:  (Not yet assigned)    Acknowledged NICOLE ALLEN     Inpatient consult to Registered Dietitian/Nutritionist  Once     Provider:  (Not yet assigned)    Acknowledged NICOLE ALLEN     Inpatient consult to Registered Dietitian/Nutritionist  Once     Provider:  (Not yet assigned)    Acknowledged NICOLE ALLEN        Hospital Course By Problem:   * History of difficult venous access  -Ms. Shara Hutchins was admitted to inpatient status   -She presented from [facility] for venous access placement 2/2 difficulty obtaining venous access   -She reports LUE unable  "to be used for IV access   RUE difficulty to obtain access in given overuse   -IR consulted and have placed a dual port tunneled PICC line which is functioning well.  -Need to remove left femoral line today now that she has more definitive access.  Will remove prior to discharge.  -She is medically cleared to return to [facility] since 3/6.  -Notified at 2:40PM that bed was available.  Will transfer today.    S/P ileostomy  -Patient recently discharged (2/17) from the [facility] after ileostomy formation for the concern of pneumatosis intestinalis. Since the discharge she had constant output from the ileostomy bag (around 1 bag every 30 minutes).  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 Family felt that her chronic diarrhea worsened after the surgery to that extent that she is now unable to keep any thing in the stomach.  -She was admitted to [facility] for treatment of this.  She was treated with IV fluids, octreotide drip, anti-diarrheals and TPN.  The plan was to continue with minimal PO intake and TPN until ostomy reversed   -Per her surgeon Dr. Burnett, "Will plan to close the ostomy at soon as possible date.  Usual recommendation is 12 weeks from creation of ostomy.  Will most likely push that 8 weeks or maybe 6 weeks and her case."  -Ostomy functioning well, no surrounding infection or evidence of leakage   -Ileostomy care ordered   -Stopped octreotide yesterday.  -Continue imodium and cholestyramine.  Add lomtil  -Continue TPN.  -Patient wishes to attempt diet today, so will do so.  On admit however oral intake resulted in undigested food coming out ostomy so this may not be helpful.  -Plan is for SNF with TPN until ostomy reversal.    Acute renal failure superimposed on stage 5 chronic kidney disease, not on chronic dialysis  -improving with Cr of 1.8 today   -avoid nephrotoxins "   -renally dose meds   -continue IV fluids and TPN  -Repeat BMP in AM    Thrombocytopenia, unspecified  -Present since at least 3/1/2020  -Has steadily improved over last 72 hours  -Continue to monitor closely.    Debility  -Consult PT/OT  -Needs SNF vs IPR at discharge    Unstageable pressure injury of skin and tissue  -Wound care consulted    DNR (do not resuscitate)  -noted and code status updated     Diarrhea  -Chronic since chemo for vulvar cancer 5 years ago.  -Worse after iliostomy last month.  -Treatment as above.    Anemia of chronic disease  -appears stable   -no current/ongoing losses   -monitor     Hypothyroidism  -continue synthroid    Final Active Diagnoses:    Diagnosis Date Noted POA    PRINCIPAL PROBLEM:  History of difficult venous access [Z87.898] 03/06/2020 Yes    S/P ileostomy [Z93.2] 02/13/2020 Not Applicable    Acute renal failure superimposed on stage 5 chronic kidney disease, not on chronic dialysis [N17.9, N18.5] 02/10/2020 Yes    Debility [R53.81] 03/07/2020 Yes    Thrombocytopenia, unspecified [D69.6] 03/07/2020 Yes    Unstageable pressure injury of skin and tissue [L89.95] 03/06/2020 Yes    DNR (do not resuscitate) [Z66] 02/24/2020 Yes    Anemia of chronic disease [D63.8] 02/10/2020 Yes     Chronic    Diarrhea [R19.7] 02/10/2020 Yes    Hypothyroidism [E03.9] 02/10/2020 Yes     Chronic      Problems Resolved During this Admission:       Discharged Condition: fair    Disposition: Another Health Care Inst*    Follow Up:    Patient Instructions:   No discharge procedures on file.    Significant Diagnostic Studies: Labs:   BMP:   Recent Labs   Lab 03/06/20  0432 03/07/20 0437   * 68*    144   K 3.5 4.0    109   CO2 30* 27   BUN 28* 27*   CREATININE 2.1* 1.8*   CALCIUM 7.7* 7.9*   MG 2.3 2.1   , CMP   Recent Labs   Lab 03/06/20  0432 03/07/20 0437    144   K 3.5 4.0    109   CO2 30* 27   * 68*   BUN 28* 27*   CREATININE 2.1* 1.8*   CALCIUM 7.7*  7.9*   PROT 4.2* 4.3*   ALBUMIN 2.1* 2.2*   BILITOT 0.7 0.8   ALKPHOS 223* 220*   AST 21 20   ALT 14 16   ANIONGAP 6* 8   ESTGFRAFRICA 27* 33*   EGFRNONAA 23* 28*    and CBC   Recent Labs   Lab 03/06/20  0432 03/07/20  0437   WBC 3.61* 4.25   HGB 7.4* 7.8*   HCT 24.7* 25.4*   PLT 85* 100*       Pending Diagnostic Studies:     None         Medications:  Reconciled Home Medications:      Medication List      START taking these medications    acetaminophen 325 MG tablet  Commonly known as:  TYLENOL  Take 2 tablets (650 mg total) by mouth every 4 (four) hours as needed.     * dextrose 50%  injection  Inject 25 mLs (12.5 g total) into the vein as needed (between 51 - 70 mg/dL if patient cannnot take PO but has IV access.).     * dextrose 50%  injection  Inject 50 mLs (25 g total) into the vein as needed (less than 50 mg/dL if patient cannnot take PO but has IV access.).     diphenhydrAMINE 50 mg/mL injection  Commonly known as:  BENADRYL  Inject 0.125 mLs (6.25 mg total) into the vein every 6 (six) hours.  Replaces:  diphenhydrAMINE 25 mg capsule     * glucose 4 GM chewable tablet  Take 4 tablets (16 g total) by mouth as needed.     * glucose 4 GM chewable tablet  Take 6 tablets (24 g total) by mouth as needed.     HYDROcodone-acetaminophen 5-325 mg per tablet  Commonly known as:  NORCO  Take 1 tablet by mouth every 6 (six) hours as needed.     lactated ringers infusion  Inject 125 mL/hr into the vein continuous.     melatonin 3 mg tablet  Commonly known as:  MELATIN  Take 3 tablets (9 mg total) by mouth nightly as needed for Insomnia.     ondansetron 8 MG Tbdl  Commonly known as:  ZOFRAN-ODT  Take 1 tablet (8 mg total) by mouth every 8 (eight) hours as needed.     oxyCODONE-acetaminophen 5-325 mg per tablet  Commonly known as:  PERCOCET  Take 1 tablet by mouth every 4 (four) hours as needed.     pantoprazole 40 mg injection  Commonly known as:  PROTONIX  Inject 40 mg into the vein 2 (two) times daily.     psyllium husk  (with sugar) 3.4 gram packet  Take 2 packets by mouth 3 (three) times daily.         * This list has 4 medication(s) that are the same as other medications prescribed for you. Read the directions carefully, and ask your doctor or other care provider to review them with you.            CHANGE how you take these medications    levothyroxine 88 MCG tablet  Commonly known as:  SYNTHROID  Take 1 tablet (88 mcg total) by mouth before breakfast.  What changed:  when to take this     * loperamide 2 mg capsule  Commonly known as:  IMODIUM  Take 2 capsules (4 mg total) by mouth 4 (four) times daily as needed for Diarrhea.  What changed:  Another medication with the same name was added. Make sure you understand how and when to take each.     * loperamide 2 mg capsule  Commonly known as:  IMODIUM  Take 2 capsules (4 mg total) by mouth 4 (four) times daily. for 10 days  What changed:  You were already taking a medication with the same name, and this prescription was added. Make sure you understand how and when to take each.         * This list has 2 medication(s) that are the same as other medications prescribed for you. Read the directions carefully, and ask your doctor or other care provider to review them with you.            CONTINUE taking these medications    cholestyramine-aspartame 4 gram Pwpk  Commonly known as:  QUESTRAN LIGHT  Take 1 packet (4 g total) by mouth 2 (two) times daily.        STOP taking these medications    calcitRIOL 0.5 MCG Cap  Commonly known as:  ROCALTROL     dicyclomine 10 MG capsule  Commonly known as:  BENTYL     diphenhydrAMINE 25 mg capsule  Commonly known as:  BENADRYL  Replaced by:  diphenhydrAMINE 50 mg/mL injection     dronabinol 2.5 MG capsule  Commonly known as:  MARINOL     magnesium oxide 400 mg (241.3 mg magnesium) tablet  Commonly known as:  MAG-OX     promethazine 25 MG tablet  Commonly known as:  PHENERGAN     sertraline 50 MG tablet  Commonly known as:  ZOLOFT     sodium  bicarbonate 650 MG tablet            Indwelling Lines/Drains at time of discharge:   Lines/Drains/Airways     Peripherally Inserted Central Catheter Line            PICC Double Lumen 03/06/20 1154 other (see comments) 1 day          Central Venous Catheter Line            Tunneled Central Line Insertion/Assessment - Triple Lumen  02/28/20 2035 right femoral vein;right femoral artery 7 days          Drain                 Hemodialysis AV Fistula Left upper arm -- days         Ileostomy 02/13/20 1600 Loop RLQ 22 days         Urethral Catheter 02/23/20 2350 Non-latex 14 Fr. 12 days          Pressure Ulcer                 Pressure Injury 02/27/20 1837 Left Buttocks Stage 2 8 days         Pressure Injury 02/27/20 1837 Right Buttocks Stage 2 8 days                Time spent on the discharge of patient: 35 minutes  Patient was seen and examined on the date of discharge and determined to be suitable for discharge.         Clifford Allen MD  Department of Hospital Medicine  [facility]

## 2020-03-07 NOTE — ASSESSMENT & PLAN NOTE
-Present since at least 3/1/2020  -Has steadily improved over last 72 hours  -Continue to monitor closely.

## 2020-03-07 NOTE — PLAN OF CARE
Problem: Adult Inpatient Plan of Care  Goal: Plan of Care Review  Outcome: Ongoing, Progressing  Flowsheets (Taken 3/7/2020 0438)  Plan of Care Reviewed With: patient   Vitals stable at this time. Will continue to monitor.    Problem: Wound  Goal: Optimal Wound Healing  Outcome: Ongoing, Progressing  Intervention: Promote Effective Wound Healing  Flowsheets (Taken 3/7/2020 0438)  Pain Management Interventions: pain management plan reviewed with patient/caregiver     Problem: Fall Injury Risk  Goal: Absence of Fall and Fall-Related Injury  Outcome: Ongoing, Progressing  Intervention: Identify and Manage Contributors to Fall Injury Risk  Flowsheets (Taken 3/7/2020 0438)  Medication Review/Management: medications reviewed     Problem: Skin Injury Risk Increased  Goal: Skin Health and Integrity  Outcome: Ongoing, Progressing  Intervention: Optimize Skin Protection  Flowsheets (Taken 3/7/2020 0438)  Pressure Reduction Techniques: frequent weight shift encouraged

## 2020-03-07 NOTE — ASSESSMENT & PLAN NOTE
"-Patient recently discharged (2/17) from the Replaced by [facility] after ileostomy formation for the concern of pneumatosis intestinalis. Since the discharge she had constant output from the ileostomy bag (around 1 bag every 30 minutes).  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 Family felt that her chronic diarrhea worsened after the surgery to that extent that she is now unable to keep any thing in the stomach.  -She was admitted to [facility] for treatment of this.  She was treated with IV fluids, octreotide drip, anti-diarrheals and TPN.  The plan was to continue with minimal PO intake and TPN until ostomy reversed   -Per her surgeon Dr. Burnett, "Will plan to close the ostomy at soon as possible date.  Usual recommendation is 12 weeks from creation of ostomy.  Will most likely push that 8 weeks or maybe 6 weeks and her case."  -Ostomy functioning well, no surrounding infection or evidence of leakage   -Ileostomy care ordered   -Stopped octreotide yesterday.  -Continue imodium and cholestyramine.  Add lomtil  -Continue TPN.  -Patient wishes to attempt diet today, so will do so.  On admit however oral intake resulted in undigested food coming out ostomy so this may not be helpful.  -Plan is for SNF with TPN until surgery.    "

## 2020-03-07 NOTE — ASSESSMENT & PLAN NOTE
-Chronic since chemo for vulvar cancer 5 years ago.  -Worse after iliostomy last month.  -Treatment as above.

## 2020-03-08 LAB
ABO + RH BLD: NORMAL
ALBUMIN SERPL BCP-MCNC: 2.2 G/DL (ref 3.5–5.2)
ALP SERPL-CCNC: 189 U/L (ref 55–135)
ALT SERPL W/O P-5'-P-CCNC: 14 U/L (ref 10–44)
ANION GAP SERPL CALC-SCNC: 4 MMOL/L (ref 8–16)
AST SERPL-CCNC: 20 U/L (ref 10–40)
BASOPHILS # BLD AUTO: 0.01 K/UL (ref 0–0.2)
BASOPHILS NFR BLD: 0.3 % (ref 0–1.9)
BILIRUB SERPL-MCNC: 1.1 MG/DL (ref 0.1–1)
BLD GP AB SCN CELLS X3 SERPL QL: NORMAL
BLD PROD TYP BPU: NORMAL
BLD PROD TYP BPU: NORMAL
BLOOD UNIT EXPIRATION DATE: NORMAL
BLOOD UNIT EXPIRATION DATE: NORMAL
BLOOD UNIT TYPE CODE: 6200
BLOOD UNIT TYPE CODE: 6200
BLOOD UNIT TYPE: NORMAL
BLOOD UNIT TYPE: NORMAL
BUN SERPL-MCNC: 25 MG/DL (ref 8–23)
CALCIUM SERPL-MCNC: 7.8 MG/DL (ref 8.7–10.5)
CHLORIDE SERPL-SCNC: 107 MMOL/L (ref 95–110)
CO2 SERPL-SCNC: 29 MMOL/L (ref 23–29)
CODING SYSTEM: NORMAL
CODING SYSTEM: NORMAL
CREAT SERPL-MCNC: 1.9 MG/DL (ref 0.5–1.4)
DIFFERENTIAL METHOD: ABNORMAL
DISPENSE STATUS: NORMAL
DISPENSE STATUS: NORMAL
EOSINOPHIL # BLD AUTO: 0.3 K/UL (ref 0–0.5)
EOSINOPHIL NFR BLD: 7.5 % (ref 0–8)
ERYTHROCYTE [DISTWIDTH] IN BLOOD BY AUTOMATED COUNT: 16 % (ref 11.5–14.5)
EST. GFR  (AFRICAN AMERICAN): 30.6 ML/MIN/1.73 M^2
EST. GFR  (NON AFRICAN AMERICAN): 26.5 ML/MIN/1.73 M^2
GLUCOSE SERPL-MCNC: 122 MG/DL (ref 70–110)
GLUCOSE SERPL-MCNC: 153 MG/DL (ref 70–110)
HCT VFR BLD AUTO: 23.5 % (ref 37–48.5)
HGB BLD-MCNC: 7.2 G/DL (ref 12–16)
IMM GRANULOCYTES # BLD AUTO: 0.01 K/UL (ref 0–0.04)
IMM GRANULOCYTES NFR BLD AUTO: 0.3 % (ref 0–0.5)
LYMPHOCYTES # BLD AUTO: 1 K/UL (ref 1–4.8)
LYMPHOCYTES NFR BLD: 25.7 % (ref 18–48)
MAGNESIUM SERPL-MCNC: 2 MG/DL (ref 1.6–2.6)
MCH RBC QN AUTO: 31.2 PG (ref 27–31)
MCHC RBC AUTO-ENTMCNC: 30.6 G/DL (ref 32–36)
MCV RBC AUTO: 102 FL (ref 82–98)
MONOCYTES # BLD AUTO: 0.4 K/UL (ref 0.3–1)
MONOCYTES NFR BLD: 11 % (ref 4–15)
NEUTROPHILS # BLD AUTO: 2.1 K/UL (ref 1.8–7.7)
NEUTROPHILS NFR BLD: 55.2 % (ref 38–73)
NRBC BLD-RTO: 0 /100 WBC
NUM UNITS TRANS PACKED RBC: NORMAL
NUM UNITS TRANS PACKED RBC: NORMAL
PHOSPHATE SERPL-MCNC: 3 MG/DL (ref 2.7–4.5)
PLATELET # BLD AUTO: 99 K/UL (ref 150–350)
PMV BLD AUTO: 12.5 FL (ref 9.2–12.9)
POTASSIUM SERPL-SCNC: 3.3 MMOL/L (ref 3.5–5.1)
PROT SERPL-MCNC: 4.3 G/DL (ref 6–8.4)
RBC # BLD AUTO: 2.31 M/UL (ref 4–5.4)
SODIUM SERPL-SCNC: 140 MMOL/L (ref 136–145)
WBC # BLD AUTO: 3.73 K/UL (ref 3.9–12.7)

## 2020-03-08 PROCEDURE — 25000003 PHARM REV CODE 250: Performed by: INTERNAL MEDICINE

## 2020-03-08 PROCEDURE — 25000003 PHARM REV CODE 250: Performed by: HOSPITALIST

## 2020-03-08 PROCEDURE — G0378 HOSPITAL OBSERVATION PER HR: HCPCS

## 2020-03-08 PROCEDURE — 86901 BLOOD TYPING SEROLOGIC RH(D): CPT

## 2020-03-08 PROCEDURE — 63600175 PHARM REV CODE 636 W HCPCS: Performed by: HOSPITALIST

## 2020-03-08 PROCEDURE — 86920 COMPATIBILITY TEST SPIN: CPT

## 2020-03-08 PROCEDURE — 63600175 PHARM REV CODE 636 W HCPCS: Performed by: INTERNAL MEDICINE

## 2020-03-08 PROCEDURE — B4185 PARENTERAL SOL 10 GM LIPIDS: HCPCS | Performed by: INTERNAL MEDICINE

## 2020-03-08 PROCEDURE — 84100 ASSAY OF PHOSPHORUS: CPT

## 2020-03-08 PROCEDURE — 85025 COMPLETE CBC W/AUTO DIFF WBC: CPT

## 2020-03-08 PROCEDURE — A4217 STERILE WATER/SALINE, 500 ML: HCPCS | Performed by: INTERNAL MEDICINE

## 2020-03-08 PROCEDURE — P9016 RBC LEUKOCYTES REDUCED: HCPCS

## 2020-03-08 PROCEDURE — 80053 COMPREHEN METABOLIC PANEL: CPT

## 2020-03-08 PROCEDURE — 83735 ASSAY OF MAGNESIUM: CPT

## 2020-03-08 PROCEDURE — C9113 INJ PANTOPRAZOLE SODIUM, VIA: HCPCS | Performed by: HOSPITALIST

## 2020-03-08 PROCEDURE — C9113 INJ PANTOPRAZOLE SODIUM, VIA: HCPCS | Performed by: INTERNAL MEDICINE

## 2020-03-08 PROCEDURE — 82962 GLUCOSE BLOOD TEST: CPT

## 2020-03-08 RX ORDER — HYDROCODONE BITARTRATE AND ACETAMINOPHEN 500; 5 MG/1; MG/1
TABLET ORAL
Status: DISCONTINUED | OUTPATIENT
Start: 2020-03-08 | End: 2020-03-08 | Stop reason: SDUPTHER

## 2020-03-08 RX ORDER — POTASSIUM CHLORIDE 7.45 MG/ML
40 INJECTION INTRAVENOUS
Status: DISCONTINUED | OUTPATIENT
Start: 2020-03-08 | End: 2020-03-24

## 2020-03-08 RX ORDER — POTASSIUM CHLORIDE 7.45 MG/ML
20 INJECTION INTRAVENOUS
Status: DISCONTINUED | OUTPATIENT
Start: 2020-03-08 | End: 2020-03-24

## 2020-03-08 RX ORDER — POTASSIUM CHLORIDE 20 MEQ/1
20 TABLET, EXTENDED RELEASE ORAL
Status: DISCONTINUED | OUTPATIENT
Start: 2020-03-08 | End: 2020-03-27 | Stop reason: HOSPADM

## 2020-03-08 RX ORDER — MAGNESIUM SULFATE HEPTAHYDRATE 40 MG/ML
2 INJECTION, SOLUTION INTRAVENOUS
Status: DISCONTINUED | OUTPATIENT
Start: 2020-03-08 | End: 2020-03-27 | Stop reason: HOSPADM

## 2020-03-08 RX ORDER — MAGNESIUM SULFATE 1 G/100ML
1 INJECTION INTRAVENOUS
Status: DISCONTINUED | OUTPATIENT
Start: 2020-03-08 | End: 2020-03-24

## 2020-03-08 RX ORDER — DEXTROSE MONOHYDRATE 100 MG/ML
INJECTION, SOLUTION INTRAVENOUS CONTINUOUS PRN
Status: DISCONTINUED | OUTPATIENT
Start: 2020-03-08 | End: 2020-03-27 | Stop reason: HOSPADM

## 2020-03-08 RX ORDER — POTASSIUM CHLORIDE 20 MEQ/1
40 TABLET, EXTENDED RELEASE ORAL
Status: DISCONTINUED | OUTPATIENT
Start: 2020-03-08 | End: 2020-03-24

## 2020-03-08 RX ORDER — MAGNESIUM SULFATE 1 G/100ML
1 INJECTION INTRAVENOUS
Status: DISCONTINUED | OUTPATIENT
Start: 2020-03-08 | End: 2020-03-27 | Stop reason: HOSPADM

## 2020-03-08 RX ORDER — CALCIUM CHLORIDE IN 0.9 % NACL 1 G/100 ML
1 INTRAVENOUS SOLUTION, PIGGYBACK (ML) INTRAVENOUS
Status: DISCONTINUED | OUTPATIENT
Start: 2020-03-08 | End: 2020-03-24

## 2020-03-08 RX ORDER — POTASSIUM CHLORIDE 20 MEQ/1
20 TABLET, EXTENDED RELEASE ORAL
Status: DISCONTINUED | OUTPATIENT
Start: 2020-03-08 | End: 2020-03-24

## 2020-03-08 RX ORDER — LANOLIN ALCOHOL/MO/W.PET/CERES
800 CREAM (GRAM) TOPICAL
Status: DISCONTINUED | OUTPATIENT
Start: 2020-03-08 | End: 2020-03-27 | Stop reason: HOSPADM

## 2020-03-08 RX ORDER — LANOLIN ALCOHOL/MO/W.PET/CERES
800 CREAM (GRAM) TOPICAL
Status: DISCONTINUED | OUTPATIENT
Start: 2020-03-08 | End: 2020-03-24

## 2020-03-08 RX ORDER — CALCIUM CHLORIDE IN 0.9 % NACL 1 G/100 ML
1 INTRAVENOUS SOLUTION, PIGGYBACK (ML) INTRAVENOUS
Status: DISCONTINUED | OUTPATIENT
Start: 2020-03-08 | End: 2020-03-27 | Stop reason: HOSPADM

## 2020-03-08 RX ORDER — POTASSIUM CHLORIDE 7.45 MG/ML
40 INJECTION INTRAVENOUS
Status: DISCONTINUED | OUTPATIENT
Start: 2020-03-08 | End: 2020-03-27 | Stop reason: HOSPADM

## 2020-03-08 RX ORDER — POTASSIUM CHLORIDE 7.45 MG/ML
20 INJECTION INTRAVENOUS
Status: DISCONTINUED | OUTPATIENT
Start: 2020-03-08 | End: 2020-03-27 | Stop reason: HOSPADM

## 2020-03-08 RX ORDER — POTASSIUM CHLORIDE 20 MEQ/1
40 TABLET, EXTENDED RELEASE ORAL
Status: DISCONTINUED | OUTPATIENT
Start: 2020-03-08 | End: 2020-03-27 | Stop reason: HOSPADM

## 2020-03-08 RX ORDER — MAGNESIUM SULFATE HEPTAHYDRATE 40 MG/ML
4 INJECTION, SOLUTION INTRAVENOUS
Status: DISCONTINUED | OUTPATIENT
Start: 2020-03-08 | End: 2020-03-24

## 2020-03-08 RX ORDER — MAGNESIUM SULFATE HEPTAHYDRATE 40 MG/ML
4 INJECTION, SOLUTION INTRAVENOUS
Status: DISCONTINUED | OUTPATIENT
Start: 2020-03-08 | End: 2020-03-27 | Stop reason: HOSPADM

## 2020-03-08 RX ORDER — MAGNESIUM SULFATE HEPTAHYDRATE 40 MG/ML
2 INJECTION, SOLUTION INTRAVENOUS
Status: DISCONTINUED | OUTPATIENT
Start: 2020-03-08 | End: 2020-03-24

## 2020-03-08 RX ADMIN — OCTREOTIDE ACETATE 25 MCG/HR: 500 INJECTION, SOLUTION INTRAVENOUS; SUBCUTANEOUS at 06:03

## 2020-03-08 RX ADMIN — SERTRALINE HYDROCHLORIDE 50 MG: 50 TABLET ORAL at 09:03

## 2020-03-08 RX ADMIN — SMOFLIPID: 6; 6; 5; 3 INJECTION, EMULSION INTRAVENOUS at 06:03

## 2020-03-08 RX ADMIN — CHOLESTYRAMINE 4 G: 4 POWDER, FOR SUSPENSION ORAL at 09:03

## 2020-03-08 RX ADMIN — PSYLLIUM HUSK 2 PACKET: 3.4 POWDER ORAL at 10:03

## 2020-03-08 RX ADMIN — MAGNESIUM OXIDE 400 MG: 400 TABLET ORAL at 09:03

## 2020-03-08 RX ADMIN — DIPHENHYDRAMINE HYDROCHLORIDE 6.25 MG: 50 INJECTION INTRAMUSCULAR; INTRAVENOUS at 11:03

## 2020-03-08 RX ADMIN — LOPERAMIDE HYDROCHLORIDE 4 MG: 2 CAPSULE ORAL at 12:03

## 2020-03-08 RX ADMIN — POTASSIUM CHLORIDE 40 MEQ: 20 TABLET, EXTENDED RELEASE ORAL at 09:03

## 2020-03-08 RX ADMIN — SODIUM CHLORIDE, SODIUM LACTATE, POTASSIUM CHLORIDE, AND CALCIUM CHLORIDE: .6; .31; .03; .02 INJECTION, SOLUTION INTRAVENOUS at 06:03

## 2020-03-08 RX ADMIN — HYDROCODONE BITARTRATE AND ACETAMINOPHEN 1 TABLET: 5; 325 TABLET ORAL at 06:03

## 2020-03-08 RX ADMIN — DRONABINOL 2.5 MG: 2.5 CAPSULE ORAL at 06:03

## 2020-03-08 RX ADMIN — LEVOTHYROXINE SODIUM 88 MCG: 88 TABLET ORAL at 06:03

## 2020-03-08 RX ADMIN — DIPHENHYDRAMINE HYDROCHLORIDE 6.25 MG: 50 INJECTION INTRAMUSCULAR; INTRAVENOUS at 06:03

## 2020-03-08 RX ADMIN — DRONABINOL 2.5 MG: 2.5 CAPSULE ORAL at 04:03

## 2020-03-08 RX ADMIN — LOPERAMIDE HYDROCHLORIDE 4 MG: 2 CAPSULE ORAL at 09:03

## 2020-03-08 RX ADMIN — HYDROCODONE BITARTRATE AND ACETAMINOPHEN 1 TABLET: 5; 325 TABLET ORAL at 07:03

## 2020-03-08 RX ADMIN — PANTOPRAZOLE SODIUM 40 MG: 40 INJECTION, POWDER, LYOPHILIZED, FOR SOLUTION INTRAVENOUS at 09:03

## 2020-03-08 RX ADMIN — LOPERAMIDE HYDROCHLORIDE 4 MG: 2 CAPSULE ORAL at 04:03

## 2020-03-08 RX ADMIN — PSYLLIUM HUSK 2 PACKET: 3.4 POWDER ORAL at 09:03

## 2020-03-08 RX ADMIN — PSYLLIUM HUSK 2 PACKET: 3.4 POWDER ORAL at 04:03

## 2020-03-08 NOTE — PLAN OF CARE
Problem: Adult Inpatient Plan of Care  Goal: Plan of Care Review  Outcome: Ongoing, Progressing  Goal: Patient-Specific Goal (Individualization)  Outcome: Ongoing, Progressing  Goal: Absence of Hospital-Acquired Illness or Injury  Outcome: Ongoing, Progressing  Goal: Optimal Comfort and Wellbeing  Outcome: Ongoing, Progressing  Goal: Readiness for Transition of Care  Outcome: Ongoing, Progressing  Goal: Rounds/Family Conference  Outcome: Ongoing, Progressing     Problem: Infection  Goal: Infection Symptom Resolution  Outcome: Ongoing, Progressing     Problem: Wound  Goal: Optimal Wound Healing  Outcome: Ongoing, Progressing     Problem: Fall Injury Risk  Goal: Absence of Fall and Fall-Related Injury  Outcome: Ongoing, Progressing

## 2020-03-08 NOTE — PROGRESS NOTES
"TPN PROGRESS NOTE:  Day #8    Objective:  Diagnosis/Indication for TPN: Severe malnutrition due to high output ostomy  TPN started on 3/1/20 (previous [facility] admission).  TPN continued at other facility ([facility]).  Patient was transferred back to [facility] yesterday without interruption in TPN therapy.    69 y.o., female    The patient has the following labs:  Lab Results   Component Value Date     03/08/2020    K 3.3 (L) 03/08/2020     03/08/2020    CO2 29 03/08/2020    BUN 25 (H) 03/08/2020    CREATININE 1.9 (H) 03/08/2020    CALCIUM 7.8 (L) 03/08/2020      IV Access:  Central (Double lumen PICC)      Diet/Tube Feeding:  Clear liquids    Patient received supplemental potassium chloride 40 mEq PO this morning for hypokalemia.      Assessment:  Estimated body mass index is 21.6 kg/m² as calculated from the following:    Height as of this encounter: 5' 7.5" (1.715 m).    Weight as of this encounter: 63.5 kg (139 lb 15.9 oz).  Ideal Body Weight = 62.8 kg  Actual Body Weight = 101% IBW, much improved since start of TPN.  On 3/1/20, Actual Body Weight was 51 kg; 81% IBW.    Estimated Creatinine Clearance: 27.7 mL/min (A) (based on SCr of 1.9 mg/dL (H)).     Creatinine Values   Date Value Ref Range Status   03/08/2020 1.9 (H) 0.5 - 1.4 mg/dL Final   03/07/2020 1.8 (H) 0.5 - 1.4 mg/dL Final   03/06/2020 2.1 (H) 0.5 - 1.4 mg/dL Final   03/05/2020 2.3 (H) 0.5 - 1.4 mg/dL Final   03/04/2020 2.4 (H) 0.5 - 1.4 mg/dL Final   03/03/2020 2.7 (H) 0.5 - 1.4 mg/dL Final     Renal function has improved.    Plan:  Adjust estimated nutritional needs as patient has improved absorption:   Goal calories:  1780 - 1905 kcal/day (28 - 30 kcal/kg/day)   Goal protein:  94 - 125 gm/day (1.5 - 2.0 gm/kg/day)  Adjustments to TPN:  Increase protein content.  Will increase slowly to protect kidneys.  Add potassium chloride to TPN.  Reduce excess free water.  Lipids are included in 3-1-in TPN, therefore, total volume remains the " same.    Today's TPN provides 90 gm protein and 1764 kcal.    Ordered Accu-Cheks for glucose monitoring since absorption of macronutriients is improved and as hypoglycemia is noted from yesterday.  Plan to discontinue Accu-Cheks after 3 days if serum glucoses are well-controlled.    Will continue to monitor electrolytes daily and adjust parenteral nutrition as warranted.    Thank you for allowing us to participate in this patient's care.     Priscilla Hernandez, PharmD, 03/08/2020 12:09 PM  Nutrition Support Team  ID Clinical Pharmacist, Ext [phone]

## 2020-03-08 NOTE — PROGRESS NOTES
Pt assisted into shower by this RN.  Central line dressing covered with Tegaderm.  Pt's ileostomy draining to [facility] bag for transport.  Wound care performed as ordered after shower.  Triad paste and foam dressing applied to sacral wound.

## 2020-03-08 NOTE — PLAN OF CARE
03/08/20 1038   Medicare Message   Important Message from Medicare regarding Discharge Appeal Rights Given to patient/caregiver;Explained to patient/caregiver;Signed/date by patient/caregiver   HAND Message   Date HAND was signed 03/08/20   Time HAND was signed 1038

## 2020-03-08 NOTE — SUBJECTIVE & OBJECTIVE
Past Medical History:   Diagnosis Date    Chronic kidney disease, stage III (moderate) 1/7/2018    Diabetes mellitus, type 2     Fatigue     Hypertension     Iron deficiency anemia due to chronic blood loss 1/7/2018    Vulvar cancer 1/7/2019       Past Surgical History:   Procedure Laterality Date    ABDOMINAL SURGERY      BACK SURGERY      carpel tunnel surgery once on each hand      CERVICAL FUSION      x 4    cervix repair      CHOLECYSTECTOMY  2000    DIAGNOSTIC LAPAROSCOPY N/A 2/13/2020    Procedure: LAPAROSCOPY, DIAGNOSTIC;  Surgeon: Robbi Lovell III, MD;  Location: [facility];  Service: General;  Laterality: N/A;    HYSTERECTOMY  1980    ILEOSTOMY N/A 2/13/2020    Procedure: CREATION, ILEOSTOMY Loop;  Surgeon: Robbi Lovell III, MD;  Location: [facility] OR;  Service: General;  Laterality: N/A;    LUMBAR LAMINECTOMY      LYSIS OF ADHESIONS N/A 2/13/2020    Procedure: LYSIS, ADHESIONS;  Surgeon: Robbi Lovell III, MD;  Location: [facility] OR;  Service: General;  Laterality: N/A;    NECK SURGERY      PHLEBOGRAPHY  2/28/2020    Procedure: VENOGRAM;  Surgeon: Robbi Lovell III, MD;  Location: [facility] OR;  Service: General;;    REMOVAL OF VASCULAR ACCESS PORT Right 2/13/2020    Procedure: REMOVAL, VASCULAR ACCESS PORT;  Surgeon: Robbi Lovell III, MD;  Location: [facility];  Service: General;  Laterality: Right;    SHOULDER SURGERY      vulva cancer         Review of patient's allergies indicates:   Allergen Reactions    Ciprofloxacin Other (See Comments)     Elevated liver enzymes      Pcn [penicillins] Anaphylaxis       Current Facility-Administered Medications on File Prior to Encounter   Medication    [DISCONTINUED] 0.9%  NaCl infusion (for blood administration)    [DISCONTINUED] acetaminophen tablet 650 mg    [DISCONTINUED] cholestyramine-aspartame 4 gram packet 4 g    [DISCONTINUED] dextrose 50% injection 12.5 g    [DISCONTINUED] dextrose 50% injection 25 g     [DISCONTINUED] diphenhydrAMINE injection 6.25 mg    [DISCONTINUED] diphenoxylate-atropine 2.5-0.025 mg per tablet 1 tablet    [DISCONTINUED] dronabinol capsule 2.5 mg    [DISCONTINUED] fat emulsion 20% infusion 250 mL    [DISCONTINUED] glucagon (human recombinant) injection 1 mg    [DISCONTINUED] glucose chewable tablet 16 g    [DISCONTINUED] glucose chewable tablet 24 g    [DISCONTINUED] lactated ringers infusion    [DISCONTINUED] levothyroxine tablet 88 mcg    [DISCONTINUED] loperamide capsule 4 mg    [DISCONTINUED] magnesium oxide tablet 400 mg    [DISCONTINUED] melatonin tablet 9 mg    [DISCONTINUED] ondansetron disintegrating tablet 8 mg    [DISCONTINUED] oxyCODONE-acetaminophen 5-325 mg per tablet 1 tablet    [DISCONTINUED] pantoprazole injection 40 mg    [DISCONTINUED] psyllium husk (with sugar) 3.4 gram packet 2 packet    [DISCONTINUED] sertraline tablet 50 mg    [DISCONTINUED] sodium chloride 0.9% flush 10 mL    [DISCONTINUED] TPN ADULT CENTRAL LINE CUSTOM    [DISCONTINUED] vancomycin 1 g in dextrose 5 % 250 mL IVPB (ready to mix system)     Current Outpatient Medications on File Prior to Encounter   Medication Sig    acetaminophen (TYLENOL) 325 MG tablet Take 2 tablets (650 mg total) by mouth every 4 (four) hours as needed.    cholestyramine-aspartame (QUESTRAN LIGHT) 4 gram PwPk Take 1 packet (4 g total) by mouth 2 (two) times daily.    dextrose 50% injection Inject 25 mLs (12.5 g total) into the vein as needed (between 51 - 70 mg/dL if patient cannnot take PO but has IV access.).    dextrose 50% injection Inject 50 mLs (25 g total) into the vein as needed (less than 50 mg/dL if patient cannnot take PO but has IV access.).    diphenhydrAMINE (BENADRYL) 50 mg/mL injection Inject 0.125 mLs (6.25 mg total) into the vein every 6 (six) hours.    glucose 4 GM chewable tablet Take 4 tablets (16 g total) by mouth as needed.    glucose 4 GM chewable tablet Take 6 tablets (24 g total) by  mouth as needed.    HYDROcodone-acetaminophen (NORCO) 5-325 mg per tablet Take 1 tablet by mouth every 6 (six) hours as needed.    levothyroxine (SYNTHROID) 88 MCG tablet Take 1 tablet (88 mcg total) by mouth before breakfast.    loperamide (IMODIUM) 2 mg capsule Take 2 capsules (4 mg total) by mouth 4 (four) times daily as needed for Diarrhea.    loperamide (IMODIUM) 2 mg capsule Take 2 capsules (4 mg total) by mouth 4 (four) times daily. for 10 days    melatonin (MELATIN) 3 mg tablet Take 3 tablets (9 mg total) by mouth nightly as needed for Insomnia.    ondansetron (ZOFRAN-ODT) 8 MG TbDL Take 1 tablet (8 mg total) by mouth every 8 (eight) hours as needed.    oxyCODONE-acetaminophen (PERCOCET) 5-325 mg per tablet Take 1 tablet by mouth every 4 (four) hours as needed.    pantoprazole (PROTONIX) 40 mg injection Inject 40 mg into the vein 2 (two) times daily.    psyllium husk, with sugar, 3.4 gram packet Take 2 packets by mouth 3 (three) times daily.    Ringer's solution,lactated (LACTATED RINGERS) infusion Inject 125 mL/hr into the vein continuous.     Family History     Problem Relation (Age of Onset)    Cancer Mother    Heart disease Mother, Father, Sister    Hypertension Mother    No Known Problems Daughter        Tobacco Use    Smoking status: Former Smoker     Packs/day: 1.00     Years: 33.00     Pack years: 33.00     Types: Cigarettes     Last attempt to quit: 2000     Years since quittin.6    Smokeless tobacco: Never Used   Substance and Sexual Activity    Alcohol use: No    Drug use: No    Sexual activity: Not on file     Review of Systems   Constitutional: Positive for appetite change and fatigue. Negative for chills and fever.   HENT: Negative.    Eyes: Negative.    Respiratory: Negative.    Cardiovascular: Negative.    Gastrointestinal: Positive for diarrhea.   Genitourinary: Negative.    Musculoskeletal: Positive for arthralgias.   Neurological: Positive for dizziness and  weakness.   Psychiatric/Behavioral: Negative.      Objective:     Vital Signs (Most Recent):  Temp: 98 °F (36.7 °C) (03/08/20 1224)  Pulse: (!) 58 (03/08/20 1224)  Resp: 17 (03/08/20 1224)  BP: 130/60 (03/08/20 1224)  SpO2: 97 % (03/07/20 2348) Vital Signs (24h Range):  Temp:  [97.9 °F (36.6 °C)-98.2 °F (36.8 °C)] 98 °F (36.7 °C)  Pulse:  [54-66] 58  Resp:  [14-18] 17  SpO2:  [97 %-99 %] 97 %  BP: (129-145)/(60-74) 130/60     Weight: 63.5 kg (139 lb 15.9 oz)  Body mass index is 21.6 kg/m².    Physical Exam   Constitutional: She is oriented to person, place, and time. No distress.   HENT:   Head: Normocephalic and atraumatic.   Eyes: Pupils are equal, round, and reactive to light. Conjunctivae and EOM are normal.   Neck: Normal range of motion. Neck supple.   Cardiovascular: Normal rate and regular rhythm. Exam reveals gallop.   Pulmonary/Chest: Effort normal and breath sounds normal.   Picc line in right subclavian   Abdominal: Soft. Bowel sounds are normal.   Musculoskeletal: Normal range of motion.   Neurological: She is alert and oriented to person, place, and time.   Skin: Skin is warm. She is not diaphoretic.   Psychiatric: She has a normal mood and affect.         CRANIAL NERVES     CN III, IV, VI   Pupils are equal, round, and reactive to light.  Extraocular motions are normal.        Significant Labs:   BMP:   Recent Labs   Lab 03/08/20 0621   *      K 3.3*      CO2 29   BUN 25*   CREATININE 1.9*   CALCIUM 7.8*   MG 2.0     CBC:   Recent Labs   Lab 03/07/20 0437 03/08/20 0621   WBC 4.25 3.73*   HGB 7.8* 7.2*   HCT 25.4* 23.5*   * 99*     CMP:   Recent Labs   Lab 03/07/20 0437 03/08/20 0621    140   K 4.0 3.3*    107   CO2 27 29   GLU 68* 153*   BUN 27* 25*   CREATININE 1.8* 1.9*   CALCIUM 7.9* 7.8*   PROT 4.3* 4.3*   ALBUMIN 2.2* 2.2*   BILITOT 0.8 1.1*   ALKPHOS 220* 189*   AST 20 20   ALT 16 14   ANIONGAP 8 4*   EGFRNONAA 28* 26.5*     Magnesium:   Recent Labs    Lab 03/07/20  0437 03/08/20  0621   MG 2.1 2.0     Prealbumin: No results for input(s): PREALBUMIN in the last 48 hours.  TSH:   Recent Labs   Lab 02/23/20  1530   TSH 7.180*       Significant Imaging: I have reviewed all pertinent imaging results/findings within the past 24 hours.

## 2020-03-08 NOTE — H&P
Formerly [facility], later [facility]  History & Physical    Patient Name: Shara Hutchins  MRN: [id number]  Admission Date: 3/7/2020  Attending Physician: Ashwin Diaz MD  Primary Care Provider: April Horton MD         Patient information was obtained from patient, past medical records and ER records.     Subjective:     Principal Problem:<principal problem not specified>    Chief Complaint: No chief complaint on file.       HPI: HTN, NIDDM-2, CKD-3, vulvar cancer (s/p chemo/radiation), and now s/p ileostomy after pneumatosis intestinalis earlier in 2020 and with persistent diarrhea 2/2 chemotherapy, who is transferred from [facility] for IR placement of CVC. Upon admission to [facility], she was having high output via her ileostomy, and subsequent DARCI and dehydration. She was placed on TPN, but there was difficulty getting IV access, and thus was receiving the TPN via a right femoral CVC, which was limiting therapy participation. As a tunneled line was unable to be obtained, she was transferred for IR services at [facility].      Procedure(s) (LRB):  Insertion, Picc, Age 5 Years Or Older (Right    3/8: Ms Hutchins will require placement for long term TPN/octreatide fofor 60 days    Past Medical History:   Diagnosis Date    Chronic kidney disease, stage III (moderate) 1/7/2018    Diabetes mellitus, type 2     Fatigue     Hypertension     Iron deficiency anemia due to chronic blood loss 1/7/2018    Vulvar cancer 1/7/2019       Past Surgical History:   Procedure Laterality Date    ABDOMINAL SURGERY      BACK SURGERY      carpel tunnel surgery once on each hand      CERVICAL FUSION      x 4    cervix repair      CHOLECYSTECTOMY  2000    DIAGNOSTIC LAPAROSCOPY N/A 2/13/2020    Procedure: LAPAROSCOPY, DIAGNOSTIC;  Surgeon: Robbi Lovell III, MD;  Location: [facility] OR;  Service: General;  Laterality: N/A;    HYSTERECTOMY  1980    ILEOSTOMY N/A 2/13/2020    Procedure: CREATION, ILEOSTOMY  Loop;  Surgeon: Robbi Lovell III, MD;  Location: [facility];  Service: General;  Laterality: N/A;    LUMBAR LAMINECTOMY      LYSIS OF ADHESIONS N/A 2/13/2020    Procedure: LYSIS, ADHESIONS;  Surgeon: Robbi Lovell III, MD;  Location: [facility] OR;  Service: General;  Laterality: N/A;    NECK SURGERY      PHLEBOGRAPHY  2/28/2020    Procedure: VENOGRAM;  Surgeon: Robbi Lovell III, MD;  Location: [facility] OR;  Service: General;;    REMOVAL OF VASCULAR ACCESS PORT Right 2/13/2020    Procedure: REMOVAL, VASCULAR ACCESS PORT;  Surgeon: Robbi Lovell III, MD;  Location: [facility] OR;  Service: General;  Laterality: Right;    SHOULDER SURGERY      vulva cancer         Review of patient's allergies indicates:   Allergen Reactions    Ciprofloxacin Other (See Comments)     Elevated liver enzymes      Pcn [penicillins] Anaphylaxis       Current Facility-Administered Medications on File Prior to Encounter   Medication    [DISCONTINUED] 0.9%  NaCl infusion (for blood administration)    [DISCONTINUED] acetaminophen tablet 650 mg    [DISCONTINUED] cholestyramine-aspartame 4 gram packet 4 g    [DISCONTINUED] dextrose 50% injection 12.5 g    [DISCONTINUED] dextrose 50% injection 25 g    [DISCONTINUED] diphenhydrAMINE injection 6.25 mg    [DISCONTINUED] diphenoxylate-atropine 2.5-0.025 mg per tablet 1 tablet    [DISCONTINUED] dronabinol capsule 2.5 mg    [DISCONTINUED] fat emulsion 20% infusion 250 mL    [DISCONTINUED] glucagon (human recombinant) injection 1 mg    [DISCONTINUED] glucose chewable tablet 16 g    [DISCONTINUED] glucose chewable tablet 24 g    [DISCONTINUED] lactated ringers infusion    [DISCONTINUED] levothyroxine tablet 88 mcg    [DISCONTINUED] loperamide capsule 4 mg    [DISCONTINUED] magnesium oxide tablet 400 mg    [DISCONTINUED] melatonin tablet 9 mg    [DISCONTINUED] ondansetron disintegrating tablet 8 mg    [DISCONTINUED] oxyCODONE-acetaminophen 5-325 mg per tablet 1 tablet     [DISCONTINUED] pantoprazole injection 40 mg    [DISCONTINUED] psyllium husk (with sugar) 3.4 gram packet 2 packet    [DISCONTINUED] sertraline tablet 50 mg    [DISCONTINUED] sodium chloride 0.9% flush 10 mL    [DISCONTINUED] TPN ADULT CENTRAL LINE CUSTOM    [DISCONTINUED] vancomycin 1 g in dextrose 5 % 250 mL IVPB (ready to mix system)     Current Outpatient Medications on File Prior to Encounter   Medication Sig    acetaminophen (TYLENOL) 325 MG tablet Take 2 tablets (650 mg total) by mouth every 4 (four) hours as needed.    cholestyramine-aspartame (QUESTRAN LIGHT) 4 gram PwPk Take 1 packet (4 g total) by mouth 2 (two) times daily.    dextrose 50% injection Inject 25 mLs (12.5 g total) into the vein as needed (between 51 - 70 mg/dL if patient cannnot take PO but has IV access.).    dextrose 50% injection Inject 50 mLs (25 g total) into the vein as needed (less than 50 mg/dL if patient cannnot take PO but has IV access.).    diphenhydrAMINE (BENADRYL) 50 mg/mL injection Inject 0.125 mLs (6.25 mg total) into the vein every 6 (six) hours.    glucose 4 GM chewable tablet Take 4 tablets (16 g total) by mouth as needed.    glucose 4 GM chewable tablet Take 6 tablets (24 g total) by mouth as needed.    HYDROcodone-acetaminophen (NORCO) 5-325 mg per tablet Take 1 tablet by mouth every 6 (six) hours as needed.    levothyroxine (SYNTHROID) 88 MCG tablet Take 1 tablet (88 mcg total) by mouth before breakfast.    loperamide (IMODIUM) 2 mg capsule Take 2 capsules (4 mg total) by mouth 4 (four) times daily as needed for Diarrhea.    loperamide (IMODIUM) 2 mg capsule Take 2 capsules (4 mg total) by mouth 4 (four) times daily. for 10 days    melatonin (MELATIN) 3 mg tablet Take 3 tablets (9 mg total) by mouth nightly as needed for Insomnia.    ondansetron (ZOFRAN-ODT) 8 MG TbDL Take 1 tablet (8 mg total) by mouth every 8 (eight) hours as needed.    oxyCODONE-acetaminophen (PERCOCET) 5-325 mg per tablet Take 1  tablet by mouth every 4 (four) hours as needed.    pantoprazole (PROTONIX) 40 mg injection Inject 40 mg into the vein 2 (two) times daily.    psyllium husk, with sugar, 3.4 gram packet Take 2 packets by mouth 3 (three) times daily.    Ringer's solution,lactated (LACTATED RINGERS) infusion Inject 125 mL/hr into the vein continuous.     Family History     Problem Relation (Age of Onset)    Cancer Mother    Heart disease Mother, Father, Sister    Hypertension Mother    No Known Problems Daughter        Tobacco Use    Smoking status: Former Smoker     Packs/day: 1.00     Years: 33.00     Pack years: 33.00     Types: Cigarettes     Last attempt to quit: 2000     Years since quittin.6    Smokeless tobacco: Never Used   Substance and Sexual Activity    Alcohol use: No    Drug use: No    Sexual activity: Not on file     Review of Systems   Constitutional: Positive for appetite change and fatigue. Negative for chills and fever.   HENT: Negative.    Eyes: Negative.    Respiratory: Negative.    Cardiovascular: Negative.    Gastrointestinal: Positive for diarrhea.   Genitourinary: Negative.    Musculoskeletal: Positive for arthralgias.   Neurological: Positive for dizziness and weakness.   Psychiatric/Behavioral: Negative.      Objective:     Vital Signs (Most Recent):  Temp: 98 °F (36.7 °C) (20 1224)  Pulse: (!) 58 (20 1224)  Resp: 17 (20 1224)  BP: 130/60 (20 1224)  SpO2: 97 % (20 2348) Vital Signs (24h Range):  Temp:  [97.9 °F (36.6 °C)-98.2 °F (36.8 °C)] 98 °F (36.7 °C)  Pulse:  [54-66] 58  Resp:  [14-18] 17  SpO2:  [97 %-99 %] 97 %  BP: (129-145)/(60-74) 130/60     Weight: 63.5 kg (139 lb 15.9 oz)  Body mass index is 21.6 kg/m².    Physical Exam   Constitutional: She is oriented to person, place, and time. No distress.   HENT:   Head: Normocephalic and atraumatic.   Eyes: Pupils are equal, round, and reactive to light. Conjunctivae and EOM are normal.   Neck: Normal range of  motion. Neck supple.   Cardiovascular: Normal rate and regular rhythm. Exam reveals gallop.   Pulmonary/Chest: Effort normal and breath sounds normal.   Picc line in right subclavian   Abdominal: Soft. Bowel sounds are normal.   Musculoskeletal: Normal range of motion.   Neurological: She is alert and oriented to person, place, and time.   Skin: Skin is warm. She is not diaphoretic.   Psychiatric: She has a normal mood and affect.         CRANIAL NERVES     CN III, IV, VI   Pupils are equal, round, and reactive to light.  Extraocular motions are normal.        Significant Labs:   BMP:   Recent Labs   Lab 03/08/20  0621   *      K 3.3*      CO2 29   BUN 25*   CREATININE 1.9*   CALCIUM 7.8*   MG 2.0     CBC:   Recent Labs   Lab 03/07/20  0437 03/08/20  0621   WBC 4.25 3.73*   HGB 7.8* 7.2*   HCT 25.4* 23.5*   * 99*     CMP:   Recent Labs   Lab 03/07/20 0437 03/08/20  0621    140   K 4.0 3.3*    107   CO2 27 29   GLU 68* 153*   BUN 27* 25*   CREATININE 1.8* 1.9*   CALCIUM 7.9* 7.8*   PROT 4.3* 4.3*   ALBUMIN 2.2* 2.2*   BILITOT 0.8 1.1*   ALKPHOS 220* 189*   AST 20 20   ALT 16 14   ANIONGAP 8 4*   EGFRNONAA 28* 26.5*     Magnesium:   Recent Labs   Lab 03/07/20 0437 03/08/20  0621   MG 2.1 2.0     Prealbumin: No results for input(s): PREALBUMIN in the last 48 hours.  TSH:   Recent Labs   Lab 02/23/20  1530   TSH 7.180*       Significant Imaging: I have reviewed all pertinent imaging results/findings within the past 24 hours.    Assessment/Plan:     Malnutrition  Continue TPN      Unstageable pressure injury of skin and tissue  Wound care ordered      Iron deficiency anemia due to chronic blood loss  Transfuse 1 unit of prbc's        VTE Risk Mitigation (From admission, onward)         Ordered     Place sequential compression device  Until discontinued      03/08/20 0133                   Ashwin Diaz MD  Department of Hospital Medicine   [facility]

## 2020-03-08 NOTE — PT/OT/SLP PROGRESS
Occupational Therapy  Not seen - Discharge    Patient Name:  Shara Hutchins   MRN:  [id number]    OT orders discontinued.  Patient not seen today secondary to Other (pt transferred to [facility]).     Aylin Mcdonald, ScD, LOTR  3/8/2020

## 2020-03-08 NOTE — NURSING
"Pt states she has neuropathy in her feet "real bad".  Pt states waffle mattress feels "really good."  "

## 2020-03-08 NOTE — HPI
HPI: HTN, NIDDM-2, CKD-3, vulvar cancer (s/p chemo/radiation), and now s/p ileostomy after pneumatosis intestinalis earlier in 2020 and with persistent diarrhea 2/2 chemotherapy, who is transferred from [facility] for IR placement of CVC. Upon admission to [facility], she was having high output via her ileostomy, and subsequent DARCI and dehydration. She was placed on TPN, but there was difficulty getting IV access, and thus was receiving the TPN via a right femoral CVC, which was limiting therapy participation. As a tunneled line was unable to be obtained, she was transferred for IR services at [facility].

## 2020-03-08 NOTE — NURSING
tpn bag from previous facility without lipids.  Dr. Carbajal aware and stated it's ok for tonight.  Spoke with pharmacy also regarding order.

## 2020-03-08 NOTE — PROGRESS NOTES
Pt readied for transport.  Report given to [facility].  Personal belongings packed.  TPN sent with patient.

## 2020-03-08 NOTE — PLAN OF CARE
Problem: Adult Inpatient Plan of Care  Goal: Plan of Care Review  Outcome: Ongoing, Progressing     Problem: Infection  Goal: Infection Symptom Resolution  Outcome: Ongoing, Progressing     Problem: Wound  Goal: Optimal Wound Healing  Outcome: Ongoing, Progressing     Problem: Fall Injury Risk  Goal: Absence of Fall and Fall-Related Injury  Outcome: Ongoing, Progressing

## 2020-03-08 NOTE — NURSING
Pt states she was on bedrest for 3 weeks and her legs are very weak.  Pt states she got up to shower before coming here and she feels so much better cause of it.

## 2020-03-09 LAB
ALBUMIN SERPL BCP-MCNC: 2.1 G/DL (ref 3.5–5.2)
ALP SERPL-CCNC: 173 U/L (ref 55–135)
ALT SERPL W/O P-5'-P-CCNC: 13 U/L (ref 10–44)
ANION GAP SERPL CALC-SCNC: 4 MMOL/L (ref 8–16)
AST SERPL-CCNC: 18 U/L (ref 10–40)
BASOPHILS # BLD AUTO: 0.01 K/UL (ref 0–0.2)
BASOPHILS NFR BLD: 0.3 % (ref 0–1.9)
BILIRUB SERPL-MCNC: 1.4 MG/DL (ref 0.1–1)
BUN SERPL-MCNC: 27 MG/DL (ref 8–23)
CALCIUM SERPL-MCNC: 7.5 MG/DL (ref 8.7–10.5)
CHLORIDE SERPL-SCNC: 109 MMOL/L (ref 95–110)
CO2 SERPL-SCNC: 27 MMOL/L (ref 23–29)
CREAT SERPL-MCNC: 1.7 MG/DL (ref 0.5–1.4)
DIFFERENTIAL METHOD: ABNORMAL
EOSINOPHIL # BLD AUTO: 0.2 K/UL (ref 0–0.5)
EOSINOPHIL NFR BLD: 6.3 % (ref 0–8)
ERYTHROCYTE [DISTWIDTH] IN BLOOD BY AUTOMATED COUNT: 18.8 % (ref 11.5–14.5)
EST. GFR  (AFRICAN AMERICAN): 35 ML/MIN/1.73 M^2
EST. GFR  (NON AFRICAN AMERICAN): 30.3 ML/MIN/1.73 M^2
GLUCOSE SERPL-MCNC: 106 MG/DL (ref 70–110)
GLUCOSE SERPL-MCNC: 113 MG/DL (ref 70–110)
GLUCOSE SERPL-MCNC: 119 MG/DL (ref 70–110)
GLUCOSE SERPL-MCNC: 119 MG/DL (ref 70–110)
GLUCOSE SERPL-MCNC: 127 MG/DL (ref 70–110)
HCT VFR BLD AUTO: 25.9 % (ref 37–48.5)
HGB BLD-MCNC: 8.1 G/DL (ref 12–16)
IMM GRANULOCYTES # BLD AUTO: 0.03 K/UL (ref 0–0.04)
IMM GRANULOCYTES NFR BLD AUTO: 0.8 % (ref 0–0.5)
LYMPHOCYTES # BLD AUTO: 0.9 K/UL (ref 1–4.8)
LYMPHOCYTES NFR BLD: 23.7 % (ref 18–48)
MAGNESIUM SERPL-MCNC: 2.1 MG/DL (ref 1.6–2.6)
MCH RBC QN AUTO: 30.5 PG (ref 27–31)
MCHC RBC AUTO-ENTMCNC: 31.3 G/DL (ref 32–36)
MCV RBC AUTO: 97 FL (ref 82–98)
MONOCYTES # BLD AUTO: 0.4 K/UL (ref 0.3–1)
MONOCYTES NFR BLD: 10.2 % (ref 4–15)
NEUTROPHILS # BLD AUTO: 2.3 K/UL (ref 1.8–7.7)
NEUTROPHILS NFR BLD: 58.7 % (ref 38–73)
NRBC BLD-RTO: 0 /100 WBC
PHOSPHATE SERPL-MCNC: 2.6 MG/DL (ref 2.7–4.5)
PLATELET # BLD AUTO: 101 K/UL (ref 150–350)
PMV BLD AUTO: 12.3 FL (ref 9.2–12.9)
POTASSIUM SERPL-SCNC: 3.3 MMOL/L (ref 3.5–5.1)
POTASSIUM SERPL-SCNC: 3.5 MMOL/L (ref 3.5–5.1)
PREALB SERPL-MCNC: 12 MG/DL (ref 20–43)
PROT SERPL-MCNC: 4.1 G/DL (ref 6–8.4)
RBC # BLD AUTO: 2.66 M/UL (ref 4–5.4)
SODIUM SERPL-SCNC: 140 MMOL/L (ref 136–145)
TRIGL SERPL-MCNC: 80 MG/DL (ref 30–150)
WBC # BLD AUTO: 3.84 K/UL (ref 3.9–12.7)

## 2020-03-09 PROCEDURE — 25000003 PHARM REV CODE 250: Performed by: INTERNAL MEDICINE

## 2020-03-09 PROCEDURE — B4185 PARENTERAL SOL 10 GM LIPIDS: HCPCS | Performed by: INTERNAL MEDICINE

## 2020-03-09 PROCEDURE — 63600175 PHARM REV CODE 636 W HCPCS: Performed by: INTERNAL MEDICINE

## 2020-03-09 PROCEDURE — 84100 ASSAY OF PHOSPHORUS: CPT

## 2020-03-09 PROCEDURE — 85025 COMPLETE CBC W/AUTO DIFF WBC: CPT

## 2020-03-09 PROCEDURE — 80053 COMPREHEN METABOLIC PANEL: CPT

## 2020-03-09 PROCEDURE — 84132 ASSAY OF SERUM POTASSIUM: CPT

## 2020-03-09 PROCEDURE — 12000002 HC ACUTE/MED SURGE SEMI-PRIVATE ROOM

## 2020-03-09 PROCEDURE — 84478 ASSAY OF TRIGLYCERIDES: CPT

## 2020-03-09 PROCEDURE — A4217 STERILE WATER/SALINE, 500 ML: HCPCS | Performed by: INTERNAL MEDICINE

## 2020-03-09 PROCEDURE — 83735 ASSAY OF MAGNESIUM: CPT

## 2020-03-09 PROCEDURE — C9113 INJ PANTOPRAZOLE SODIUM, VIA: HCPCS | Performed by: INTERNAL MEDICINE

## 2020-03-09 PROCEDURE — 84134 ASSAY OF PREALBUMIN: CPT

## 2020-03-09 RX ADMIN — LEVOTHYROXINE SODIUM 88 MCG: 88 TABLET ORAL at 05:03

## 2020-03-09 RX ADMIN — SODIUM CHLORIDE, SODIUM LACTATE, POTASSIUM CHLORIDE, AND CALCIUM CHLORIDE: .6; .31; .03; .02 INJECTION, SOLUTION INTRAVENOUS at 01:03

## 2020-03-09 RX ADMIN — PANTOPRAZOLE SODIUM 40 MG: 40 INJECTION, POWDER, LYOPHILIZED, FOR SOLUTION INTRAVENOUS at 09:03

## 2020-03-09 RX ADMIN — DRONABINOL 2.5 MG: 2.5 CAPSULE ORAL at 05:03

## 2020-03-09 RX ADMIN — HYDROCODONE BITARTRATE AND ACETAMINOPHEN 1 TABLET: 5; 325 TABLET ORAL at 09:03

## 2020-03-09 RX ADMIN — POTASSIUM CHLORIDE 40 MEQ: 20 TABLET, EXTENDED RELEASE ORAL at 05:03

## 2020-03-09 RX ADMIN — MAGNESIUM OXIDE 400 MG: 400 TABLET ORAL at 08:03

## 2020-03-09 RX ADMIN — LOPERAMIDE HYDROCHLORIDE 4 MG: 2 CAPSULE ORAL at 09:03

## 2020-03-09 RX ADMIN — HYDROCODONE BITARTRATE AND ACETAMINOPHEN 1 TABLET: 5; 325 TABLET ORAL at 01:03

## 2020-03-09 RX ADMIN — DIPHENHYDRAMINE HYDROCHLORIDE 6.25 MG: 50 INJECTION INTRAMUSCULAR; INTRAVENOUS at 03:03

## 2020-03-09 RX ADMIN — PSYLLIUM HUSK 2 PACKET: 3.4 POWDER ORAL at 08:03

## 2020-03-09 RX ADMIN — PSYLLIUM HUSK 2 PACKET: 3.4 POWDER ORAL at 09:03

## 2020-03-09 RX ADMIN — HYDROCODONE BITARTRATE AND ACETAMINOPHEN 1 TABLET: 5; 325 TABLET ORAL at 08:03

## 2020-03-09 RX ADMIN — MAGNESIUM OXIDE 400 MG: 400 TABLET ORAL at 09:03

## 2020-03-09 RX ADMIN — SERTRALINE HYDROCHLORIDE 50 MG: 50 TABLET ORAL at 08:03

## 2020-03-09 RX ADMIN — CHOLESTYRAMINE 4 G: 4 POWDER, FOR SUSPENSION ORAL at 09:03

## 2020-03-09 RX ADMIN — POTASSIUM CHLORIDE: 2 INJECTION, SOLUTION, CONCENTRATE INTRAVENOUS at 07:03

## 2020-03-09 RX ADMIN — PANTOPRAZOLE SODIUM 40 MG: 40 INJECTION, POWDER, LYOPHILIZED, FOR SOLUTION INTRAVENOUS at 08:03

## 2020-03-09 RX ADMIN — DIPHENHYDRAMINE HYDROCHLORIDE 6.25 MG: 50 INJECTION INTRAMUSCULAR; INTRAVENOUS at 11:03

## 2020-03-09 RX ADMIN — CHOLESTYRAMINE 4 G: 4 POWDER, FOR SUSPENSION ORAL at 08:03

## 2020-03-09 RX ADMIN — DIPHENHYDRAMINE HYDROCHLORIDE 6.25 MG: 50 INJECTION INTRAMUSCULAR; INTRAVENOUS at 12:03

## 2020-03-09 RX ADMIN — LOPERAMIDE HYDROCHLORIDE 4 MG: 2 CAPSULE ORAL at 08:03

## 2020-03-09 RX ADMIN — PSYLLIUM HUSK 2 PACKET: 3.4 POWDER ORAL at 02:03

## 2020-03-09 RX ADMIN — LOPERAMIDE HYDROCHLORIDE 4 MG: 2 CAPSULE ORAL at 02:03

## 2020-03-09 RX ADMIN — OCTREOTIDE ACETATE 25 MCG/HR: 500 INJECTION, SOLUTION INTRAVENOUS; SUBCUTANEOUS at 09:03

## 2020-03-09 RX ADMIN — DIPHENHYDRAMINE HYDROCHLORIDE 6.25 MG: 50 INJECTION INTRAMUSCULAR; INTRAVENOUS at 05:03

## 2020-03-09 NOTE — PROGRESS NOTES
03/09/20 1145        Ileostomy 02/13/20 1600 Loop RLQ   Placement Date/Time: 02/13/20 (c) 1600   Present Prior to Hospital Arrival?: No  Inserted by: MD  Ileostomy Type: Loop  Location: RLQ  Initial Stoma Size (in): (c)    Wound Image    Stoma Appearance pink;protruding above skin level;oval   Stoma Size (in) 43mm   Appliance 2-piece  (convexity)   Accessories/Skin Care skin barrier ring   Treatment Bag change   Stoma Function stool;yellow;brown;mushy   Peristomal Assessment Denuded   Tolerance no signs/symptoms of discomfort   Output (mL)   (using suction, canister discarded, new tubing placed)        Pressure Injury 02/27/20 1837 Left Buttocks Stage 2   Date First Assessed/Time First Assessed: 02/27/20 1837   Pressure Injury Present on Admission: suspected hospital acquired  Side: Left  Location: Buttocks  Staging: Stage 2   Wound Image    Staging Stage 2   Dressing Appearance Intact   Drainage Amount Small   Drainage Characteristics/Odor Yellow   Appearance Pink;Red;Yellow;Moist  (thickening of bilateral inner buttock)   Periwound Area Redness   Wound Length (cm) 3 cm   Wound Width (cm) 2 cm   Wound Depth (cm) 0.1 cm   Wound Volume (cm^3) 0.6 cm^3   Wound Surface Area (cm^2) 6 cm^2   Care Cleansed with:;Soap and water  (Triad applied)   Dressing Island/border   patient very knowledgeable of supplies being used.

## 2020-03-09 NOTE — PLAN OF CARE
Problem: Adult Inpatient Plan of Care  Goal: Plan of Care Review  Outcome: Ongoing, Progressing  Goal: Optimal Comfort and Wellbeing  Outcome: Ongoing, Progressing     Problem: Infection  Goal: Infection Symptom Resolution  Outcome: Ongoing, Progressing     Problem: Wound  Goal: Optimal Wound Healing  Outcome: Ongoing, Progressing     Problem: Fall Injury Risk  Goal: Absence of Fall and Fall-Related Injury  Outcome: Ongoing, Progressing

## 2020-03-09 NOTE — SUBJECTIVE & OBJECTIVE
Interval History: Awaiting placement for long term TPN and sandostatin    Review of Systems   Constitutional: Positive for fatigue.   HENT: Negative.    Eyes: Negative.    Respiratory: Negative.    Cardiovascular: Negative.    Gastrointestinal:        Large output from her ileostomy.   Genitourinary: Negative.    Musculoskeletal: Negative.    Neurological: Negative.    Psychiatric/Behavioral: Negative.      Objective:     Vital Signs (Most Recent):  Temp: 98.8 °F (37.1 °C) (03/09/20 1100)  Pulse: 90 (03/09/20 1100)  Resp: 16 (03/09/20 1100)  BP: 117/82 (03/09/20 1100)  SpO2: 98 % (03/09/20 1100) Vital Signs (24h Range):  Temp:  [98.2 °F (36.8 °C)-99 °F (37.2 °C)] 98.8 °F (37.1 °C)  Pulse:  [52-90] 90  Resp:  [16-18] 16  SpO2:  [93 %-100 %] 98 %  BP: (108-139)/(57-82) 117/82     Weight: 63.5 kg (139 lb 15.9 oz)  Body mass index is 21.6 kg/m².    Intake/Output Summary (Last 24 hours) at 3/9/2020 1312  Last data filed at 3/9/2020 0600  Gross per 24 hour   Intake 4888.83 ml   Output 2250 ml   Net 2638.83 ml      Physical Exam   Constitutional: No distress.   HENT:   Head: Normocephalic and atraumatic.   Eyes: Pupils are equal, round, and reactive to light. Conjunctivae and EOM are normal.   Neck: Normal range of motion. Neck supple.   Cardiovascular: Normal rate and regular rhythm.   Pulmonary/Chest: Effort normal and breath sounds normal.   PICC line in the right chest   Abdominal: Soft. Bowel sounds are normal.   Musculoskeletal: Normal range of motion.   Skin: She is not diaphoretic.   Psychiatric: She has a normal mood and affect. Her behavior is normal. Thought content normal.       Significant Labs:   BMP:   Recent Labs   Lab 03/09/20  0243   *      K 3.3*      CO2 27   BUN 27*   CREATININE 1.7*   CALCIUM 7.5*   MG 2.1     CBC:   Recent Labs   Lab 03/08/20  0621 03/09/20  0243   WBC 3.73* 3.84*   HGB 7.2* 8.1*   HCT 23.5* 25.9*   PLT 99* 101*     CMP:   Recent Labs   Lab 03/08/20  0621  03/09/20  0243    140   K 3.3* 3.3*    109   CO2 29 27   * 127*   BUN 25* 27*   CREATININE 1.9* 1.7*   CALCIUM 7.8* 7.5*   PROT 4.3* 4.1*   ALBUMIN 2.2* 2.1*   BILITOT 1.1* 1.4*   ALKPHOS 189* 173*   AST 20 18   ALT 14 13   ANIONGAP 4* 4*   EGFRNONAA 26.5* 30.3*     Magnesium:   Recent Labs   Lab 03/08/20  0621 03/09/20  0243   MG 2.0 2.1       Significant Imaging: I have reviewed all pertinent imaging results/findings within the past 24 hours.

## 2020-03-09 NOTE — PROGRESS NOTES
[facility]  Progress Note    Patient Name: Shara Hutchins  MRN: [id number]  Patient Class: OP- Observation   Admission Date: 3/7/2020  Length of Stay: 0 days  Attending Physician: Ashwin Diaz MD  Primary Care Provider: April Horton MD        Subjective:     Principal Problem:<principal problem not specified>        HPI:  HTN, NIDDM-2, CKD-3, vulvar cancer (s/p chemo/radiation), and now s/p ileostomy after pneumatosis intestinalis earlier in 2020 and with persistent diarrhea 2/2 chemotherapy, who is transferred from [facility] for IR placement of CVC. Upon admission to [facility], she was having high output via her ileostomy, and subsequent DARCI and dehydration. She was placed on TPN, but there was difficulty getting IV access, and thus was receiving the TPN via a right femoral CVC, which was limiting therapy participation. As a tunneled line was unable to be obtained, she was transferred for IR services at [facility].      Procedure(s) (LRB):  Insertion, Picc, Age 5 Years Or Older (Right    3/8: Ms Hutchins will require placement for long term TPN/octreatide for 60 days    3/9: Pt was told that she would be evaluated by a local entity for the remainder of her treatment. She has no other complaints    Overview/Hospital Course:  No notes on file    Interval History: Awaiting placement for long term TPN and sandostatin    Review of Systems   Constitutional: Positive for fatigue.   HENT: Negative.    Eyes: Negative.    Respiratory: Negative.    Cardiovascular: Negative.    Gastrointestinal:        Large output from her ileostomy.   Genitourinary: Negative.    Musculoskeletal: Negative.    Neurological: Negative.    Psychiatric/Behavioral: Negative.      Objective:     Vital Signs (Most Recent):  Temp: 98.8 °F (37.1 °C) (03/09/20 1100)  Pulse: 90 (03/09/20 1100)  Resp: 16 (03/09/20 1100)  BP: 117/82 (03/09/20 1100)  SpO2: 98 % (03/09/20 1100) Vital Signs (24h Range):  Temp:   [98.2 °F (36.8 °C)-99 °F (37.2 °C)] 98.8 °F (37.1 °C)  Pulse:  [52-90] 90  Resp:  [16-18] 16  SpO2:  [93 %-100 %] 98 %  BP: (108-139)/(57-82) 117/82     Weight: 63.5 kg (139 lb 15.9 oz)  Body mass index is 21.6 kg/m².    Intake/Output Summary (Last 24 hours) at 3/9/2020 1312  Last data filed at 3/9/2020 0600  Gross per 24 hour   Intake 4888.83 ml   Output 2250 ml   Net 2638.83 ml      Physical Exam   Constitutional: No distress.   HENT:   Head: Normocephalic and atraumatic.   Eyes: Pupils are equal, round, and reactive to light. Conjunctivae and EOM are normal.   Neck: Normal range of motion. Neck supple.   Cardiovascular: Normal rate and regular rhythm.   Pulmonary/Chest: Effort normal and breath sounds normal.   PICC line in the right chest   Abdominal: Soft. Bowel sounds are normal.   Musculoskeletal: Normal range of motion.   Skin: She is not diaphoretic.   Psychiatric: She has a normal mood and affect. Her behavior is normal. Thought content normal.       Significant Labs:   BMP:   Recent Labs   Lab 03/09/20  0243   *      K 3.3*      CO2 27   BUN 27*   CREATININE 1.7*   CALCIUM 7.5*   MG 2.1     CBC:   Recent Labs   Lab 03/08/20  0621 03/09/20  0243   WBC 3.73* 3.84*   HGB 7.2* 8.1*   HCT 23.5* 25.9*   PLT 99* 101*     CMP:   Recent Labs   Lab 03/08/20  0621 03/09/20  0243    140   K 3.3* 3.3*    109   CO2 29 27   * 127*   BUN 25* 27*   CREATININE 1.9* 1.7*   CALCIUM 7.8* 7.5*   PROT 4.3* 4.1*   ALBUMIN 2.2* 2.1*   BILITOT 1.1* 1.4*   ALKPHOS 189* 173*   AST 20 18   ALT 14 13   ANIONGAP 4* 4*   EGFRNONAA 26.5* 30.3*     Magnesium:   Recent Labs   Lab 03/08/20  0621 03/09/20  0243   MG 2.0 2.1       Significant Imaging: I have reviewed all pertinent imaging results/findings within the past 24 hours.      Assessment/Plan:      Malnutrition  Continue TPN      Unstageable pressure injury of skin and tissue  Wound care ordered      Iron deficiency anemia due to chronic blood  loss  Transfuse 1 unit of prbc's        VTE Risk Mitigation (From admission, onward)         Ordered     Place sequential compression device  Until discontinued      03/08/20 0133                      Ashwin Diaz MD  Department of Hospital Medicine   [facility]

## 2020-03-09 NOTE — CONSULTS
[facility]  Adult Nutrition   Consult Note (Nutrition Support Management)    SUMMARY     Recommendations  Recommendation/Intervention: 1. New TPN to be initiated today @ 1700, run at 65 ml/hr. 2. Blood glucose and electrolytes to be monitored and corrected accordingly to sliding scale. 3. RD to monitor PO intake, TPN infusion and tolerance. Will continue to monitor and manage nutrition support daily.   Goals: 1. Patient to meet estimated needs via TPN provision. 2. Blood glucose and electrolyte to trendtowards normal limits.    Nutrition Goal Status: progressing towards goal    Dietitian Rounds Brief  TPN to continue. New TPN ordered.     PARENTERAL NUTRITION PROGRESS NOTE:    Parenteral Nutrition Day # 9  Diagnosis/Indication for PN: high ostomy output, severe malnutrition   IV Access: PICC  Diet/Tube Feedin calorie diabetic diet          Admixture Type: 3-in-1 (15% AA, 70% dextrose, 20% smoflipid)   Infusion Rate and Frequency: 65   Patient Acuity: Acute Care     PN Composition:   96 grams Amino Acid, 301 grams Dextrose, and 60 grams Lipid.    Today's TPN provides 2007 kcal.    Please see order for electrolytes and additives content and adjustments.   *The Nutrition Support Team will continue to monitor electrolytes daily and adjust parenteral nutrition as warranted.    Reason for Assessment  Reason For Assessment: consult, new TPN  Interdisciplinary Rounds: attended    Nutrition Risk Screen  Nutrition Risk Screen: tube feeding or parenteral nutrition       Ileostomy 20 1600 Loop RLQ-Wound Image: Images linked       Pressure Injury 20 1837 Left Buttocks Stage 2-Wound Image: Images linked  MST Score: 0  Have you recently lost weight without trying?: No(not since coming to hospital)  Weight loss score: 0  Have you been eating poorly because of a decreased appetite?: No  Appetite score: 0       Nutrition/Diet History  Spiritual, Cultural Beliefs, Episcopal Practices, Values that  "Affect Care: yes(pt states she is Nondenominational, she believes in God)  Food Allergies: NKFA  Factors Affecting Nutritional Intake: malabsorption, altered gastrointestinal function  Nutrition Support Formula Prior to Admit: Other (see comments)(Custom TPN )    Anthropometrics  Temp: 98.8 °F (37.1 °C)  Height Method: Stated  Height: 5' 7.5" (171.5 cm)  Height (inches): 67.5 in  Weight Method: Bed Scale  Weight: 63.5 kg (139 lb 15.9 oz)  Weight (lb): 139.99 lb  Ideal Body Weight (IBW), Female: 137.5 lb  % Ideal Body Weight, Female (lb): 101.81 %  BMI (Calculated): 21.6  BMI Grade: 18.5-24.9 - normal       Weight History:  Wt Readings from Last 10 Encounters:   03/08/20 63.5 kg (139 lb 15.9 oz)   03/06/20 53 kg (116 lb 13.5 oz)   03/07/20 52.6 kg (116 lb)   03/03/20 53 kg (116 lb 13.5 oz)   02/19/20 58.7 kg (129 lb 4.8 oz)   02/19/20 58.8 kg (129 lb 9.6 oz)   02/14/20 56.7 kg (125 lb)   02/05/20 56.9 kg (125 lb 6.4 oz)   01/22/20 57.2 kg (126 lb)   01/08/20 61.4 kg (135 lb 6.4 oz)       Lab/Procedures/Meds: Pertinent Labs Reviewed  Clinical Chemistry:  Recent Labs   Lab 03/07/20  0437 03/08/20  0621 03/09/20  0243    140 140   K 4.0 3.3* 3.3*    107 109   CO2 27 29 27   GLU 68* 153* 127*   BUN 27* 25* 27*   CREATININE 1.8* 1.9* 1.7*   CALCIUM 7.9* 7.8* 7.5*   PROT 4.3* 4.3* 4.1*   ALBUMIN 2.2* 2.2* 2.1*   BILITOT 0.8 1.1* 1.4*   ALKPHOS 220* 189* 173*   AST 20 20 18   ALT 16 14 13   ANIONGAP 8 4* 4*   ESTGFRAFRICA 33* 30.6* 35.0*   EGFRNONAA 28* 26.5* 30.3*   MG 2.1 2.0 2.1   PHOS 2.6* 3.0 2.6*     CBC:   Recent Labs   Lab 03/09/20 0243   WBC 3.84*   RBC 2.66*   HGB 8.1*   HCT 25.9*   *   MCV 97   MCH 30.5   MCHC 31.3*     Lipid Panel:  Recent Labs   Lab 03/09/20  0243   TRIG 80     Medications: Pertinent Medications reviewed  Scheduled Meds:   cholestyramine-aspartame  1 packet Oral BID    diphenhydrAMINE  6.25 mg Intravenous Q6H    dronabinol  2.5 mg Oral BID AC    levothyroxine  88 mcg Oral Before " breakfast    loperamide  4 mg Oral QID    magnesium oxide  400 mg Oral BID    pantoprazole  40 mg Intravenous BID    psyllium husk (with sugar)  2 packet Oral TID    sertraline  50 mg Oral Daily     Continuous Infusions:   dextrose 10 % in water (D10W)      lactated ringers 125 mL/hr at 03/09/20 0117    octreotide (SANDOSTATIN) infusion 25 mcg/hr (03/08/20 1818)    TPN ADULT CENTRAL LINE CUSTOM (3 in 1) 55 mL/hr at 03/08/20 1819    TPN ADULT CENTRAL LINE CUSTOM (3 in 1)       PRN Meds:.sodium chloride, acetaminophen, calcium chloride IVPB, calcium chloride IVPB, calcium chloride IVPB, calcium chloride IVPB, calcium chloride IVPB, calcium chloride IVPB, dextrose 10 % in water (D10W), dextrose 50%, dextrose 50%, glucagon (human recombinant), glucose, glucose, HYDROcodone-acetaminophen, magnesium oxide, magnesium oxide, magnesium sulfate IVPB, magnesium sulfate IVPB, magnesium sulfate IVPB, magnesium sulfate IVPB, magnesium sulfate IVPB, magnesium sulfate IVPB, magnesium sulfate IVPB, magnesium sulfate IVPB, melatonin, ondansetron, potassium chloride in water, potassium chloride in water, potassium chloride in water, potassium chloride in water, potassium chloride in water, potassium chloride in water, potassium chloride in water, potassium chloride in water, potassium chloride, potassium chloride, potassium chloride, potassium chloride, potassium chloride, potassium chloride, potassium chloride, potassium chloride, sodium chloride 0.9%, sodium phosphate IVPB, sodium phosphate IVPB, sodium phosphate IVPB, sodium phosphate IVPB, sodium phosphate IVPB, sodium phosphate IVPB, sodium phosphate IVPB, sodium phosphate IVPB, sodium phosphate IVPB, sodium phosphate IVPB    Estimated/Assessed Needs  Weight Used For Calorie Calculations: 63.5 kg (139 lb 15.9 oz)  Energy Calorie Requirements (kcal): 1905 - 2223 (30 - 35)   Energy Need Method: Kcal/kg  Protein Requirements: 95 - 127 (1.5 - 2 g/kg)   Weight Used For  Protein Calculations: 63.5 kg (139 lb 15.9 oz)     Estimated Fluid Requirement Method: RDA Method  RDA Method (mL): 1905       Nutrition Prescription Ordered  Current Diet Order: 2200 calorie Diabetic Diet   Current Nutrition Support Formula Ordered: Other (Comment)(custom TPN )  Current Nutrition Support Rate Ordered: 55 (ml)    Evaluation of Received Nutrient/Fluid Intake  Parenteral Calories (kcal): 1764  Parenteral Protein (gm): 90  Parenteral Fluid (mL): 1320  Energy Calories Required: meeting needs  Protein Required: meeting needs  Fluid Required: meeting needs  Tolerance: tolerating     Intake/Output Summary (Last 24 hours) at 3/9/2020 1249  Last data filed at 3/9/2020 0600  Gross per 24 hour   Intake 4888.83 ml   Output 2250 ml   Net 2638.83 ml      Nutrition Risk  Level of Risk/Frequency of Follow-up: high     Monitor and Evaluation  Food and Nutrient Intake: energy intake, parenteral nutrition intake, food and beverage intake  Food and Nutrient Adminstration: diet order, enteral and parenteral nutrition administration  Physical Activity and Function: nutrition-related ADLs and IADLs, factors affecting access to physical activity  Anthropometric Measurements: weight change, weight, body mass index  Biochemical Data, Medical Tests and Procedures: electrolyte and renal panel, glucose/endocrine profile, lipid profile, gastrointestinal profile, inflammatory profile  Nutrition-Focused Physical Findings: overall appearance     Nutrition Follow-Up  RD Follow-up?: Yes     Sandra Lovell RD 03/09/2020 12:52 PM

## 2020-03-10 PROBLEM — K52.9 CHRONIC DIARRHEA: Chronic | Status: ACTIVE | Noted: 2020-02-10

## 2020-03-10 LAB
ALBUMIN SERPL BCP-MCNC: 2.5 G/DL (ref 3.5–5.2)
ALP SERPL-CCNC: 201 U/L (ref 55–135)
ALT SERPL W/O P-5'-P-CCNC: 13 U/L (ref 10–44)
ANION GAP SERPL CALC-SCNC: 3 MMOL/L (ref 8–16)
AST SERPL-CCNC: 20 U/L (ref 10–40)
BASOPHILS # BLD AUTO: 0.02 K/UL (ref 0–0.2)
BASOPHILS NFR BLD: 0.4 % (ref 0–1.9)
BILIRUB SERPL-MCNC: 1 MG/DL (ref 0.1–1)
BUN SERPL-MCNC: 31 MG/DL (ref 8–23)
CALCIUM SERPL-MCNC: 7.6 MG/DL (ref 8.7–10.5)
CHLORIDE SERPL-SCNC: 110 MMOL/L (ref 95–110)
CO2 SERPL-SCNC: 28 MMOL/L (ref 23–29)
CREAT SERPL-MCNC: 1.6 MG/DL (ref 0.5–1.4)
DIFFERENTIAL METHOD: ABNORMAL
EOSINOPHIL # BLD AUTO: 0.3 K/UL (ref 0–0.5)
EOSINOPHIL NFR BLD: 5 % (ref 0–8)
ERYTHROCYTE [DISTWIDTH] IN BLOOD BY AUTOMATED COUNT: 18.1 % (ref 11.5–14.5)
EST. GFR  (AFRICAN AMERICAN): 37.6 ML/MIN/1.73 M^2
EST. GFR  (NON AFRICAN AMERICAN): 32.6 ML/MIN/1.73 M^2
GLUCOSE SERPL-MCNC: 102 MG/DL (ref 70–110)
GLUCOSE SERPL-MCNC: 114 MG/DL (ref 70–110)
GLUCOSE SERPL-MCNC: 116 MG/DL (ref 70–110)
GLUCOSE SERPL-MCNC: 167 MG/DL (ref 70–110)
GLUCOSE SERPL-MCNC: 176 MG/DL (ref 70–110)
HCT VFR BLD AUTO: 28.6 % (ref 37–48.5)
HGB BLD-MCNC: 8.9 G/DL (ref 12–16)
IMM GRANULOCYTES # BLD AUTO: 0.05 K/UL (ref 0–0.04)
IMM GRANULOCYTES NFR BLD AUTO: 0.9 % (ref 0–0.5)
LYMPHOCYTES # BLD AUTO: 1.1 K/UL (ref 1–4.8)
LYMPHOCYTES NFR BLD: 18.8 % (ref 18–48)
MAGNESIUM SERPL-MCNC: 2.1 MG/DL (ref 1.6–2.6)
MCH RBC QN AUTO: 30.6 PG (ref 27–31)
MCHC RBC AUTO-ENTMCNC: 31.1 G/DL (ref 32–36)
MCV RBC AUTO: 98 FL (ref 82–98)
MONOCYTES # BLD AUTO: 0.5 K/UL (ref 0.3–1)
MONOCYTES NFR BLD: 9.4 % (ref 4–15)
NEUTROPHILS # BLD AUTO: 3.7 K/UL (ref 1.8–7.7)
NEUTROPHILS NFR BLD: 65.5 % (ref 38–73)
NRBC BLD-RTO: 0 /100 WBC
PHOSPHATE SERPL-MCNC: 2.2 MG/DL (ref 2.7–4.5)
PLATELET # BLD AUTO: 115 K/UL (ref 150–350)
PMV BLD AUTO: 12.4 FL (ref 9.2–12.9)
POTASSIUM SERPL-SCNC: 3.5 MMOL/L (ref 3.5–5.1)
PROT SERPL-MCNC: 4.9 G/DL (ref 6–8.4)
RBC # BLD AUTO: 2.91 M/UL (ref 4–5.4)
SODIUM SERPL-SCNC: 141 MMOL/L (ref 136–145)
WBC # BLD AUTO: 5.64 K/UL (ref 3.9–12.7)

## 2020-03-10 PROCEDURE — 83735 ASSAY OF MAGNESIUM: CPT

## 2020-03-10 PROCEDURE — 12000002 HC ACUTE/MED SURGE SEMI-PRIVATE ROOM

## 2020-03-10 PROCEDURE — 25000003 PHARM REV CODE 250: Performed by: INTERNAL MEDICINE

## 2020-03-10 PROCEDURE — C9113 INJ PANTOPRAZOLE SODIUM, VIA: HCPCS | Performed by: INTERNAL MEDICINE

## 2020-03-10 PROCEDURE — B4185 PARENTERAL SOL 10 GM LIPIDS: HCPCS | Performed by: INTERNAL MEDICINE

## 2020-03-10 PROCEDURE — 63600175 PHARM REV CODE 636 W HCPCS: Performed by: FAMILY MEDICINE

## 2020-03-10 PROCEDURE — 63600175 PHARM REV CODE 636 W HCPCS: Performed by: INTERNAL MEDICINE

## 2020-03-10 PROCEDURE — 80053 COMPREHEN METABOLIC PANEL: CPT

## 2020-03-10 PROCEDURE — A4217 STERILE WATER/SALINE, 500 ML: HCPCS | Performed by: INTERNAL MEDICINE

## 2020-03-10 PROCEDURE — 84100 ASSAY OF PHOSPHORUS: CPT

## 2020-03-10 PROCEDURE — 85025 COMPLETE CBC W/AUTO DIFF WBC: CPT

## 2020-03-10 RX ORDER — FUROSEMIDE 10 MG/ML
20 INJECTION INTRAMUSCULAR; INTRAVENOUS ONCE
Status: COMPLETED | OUTPATIENT
Start: 2020-03-10 | End: 2020-03-10

## 2020-03-10 RX ADMIN — DIPHENHYDRAMINE HYDROCHLORIDE 6.25 MG: 50 INJECTION INTRAMUSCULAR; INTRAVENOUS at 05:03

## 2020-03-10 RX ADMIN — LOPERAMIDE HYDROCHLORIDE 4 MG: 2 CAPSULE ORAL at 01:03

## 2020-03-10 RX ADMIN — HYDROCODONE BITARTRATE AND ACETAMINOPHEN 1 TABLET: 5; 325 TABLET ORAL at 02:03

## 2020-03-10 RX ADMIN — SERTRALINE HYDROCHLORIDE 50 MG: 50 TABLET ORAL at 09:03

## 2020-03-10 RX ADMIN — DIPHENHYDRAMINE HYDROCHLORIDE 6.25 MG: 50 INJECTION INTRAMUSCULAR; INTRAVENOUS at 06:03

## 2020-03-10 RX ADMIN — CHOLESTYRAMINE 4 G: 4 POWDER, FOR SUSPENSION ORAL at 09:03

## 2020-03-10 RX ADMIN — HYDROCODONE BITARTRATE AND ACETAMINOPHEN 1 TABLET: 5; 325 TABLET ORAL at 09:03

## 2020-03-10 RX ADMIN — FUROSEMIDE 20 MG: 10 INJECTION, SOLUTION INTRAMUSCULAR; INTRAVENOUS at 05:03

## 2020-03-10 RX ADMIN — POTASSIUM CHLORIDE: 2 INJECTION, SOLUTION, CONCENTRATE INTRAVENOUS at 05:03

## 2020-03-10 RX ADMIN — LOPERAMIDE HYDROCHLORIDE 4 MG: 2 CAPSULE ORAL at 05:03

## 2020-03-10 RX ADMIN — DIPHENHYDRAMINE HYDROCHLORIDE 6.25 MG: 50 INJECTION INTRAMUSCULAR; INTRAVENOUS at 12:03

## 2020-03-10 RX ADMIN — LEVOTHYROXINE SODIUM 88 MCG: 88 TABLET ORAL at 06:03

## 2020-03-10 RX ADMIN — OCTREOTIDE ACETATE 25 MCG/HR: 500 INJECTION, SOLUTION INTRAVENOUS; SUBCUTANEOUS at 01:03

## 2020-03-10 RX ADMIN — PANTOPRAZOLE SODIUM 40 MG: 40 INJECTION, POWDER, LYOPHILIZED, FOR SOLUTION INTRAVENOUS at 09:03

## 2020-03-10 RX ADMIN — HYDROCODONE BITARTRATE AND ACETAMINOPHEN 1 TABLET: 5; 325 TABLET ORAL at 06:03

## 2020-03-10 RX ADMIN — PSYLLIUM HUSK 2 PACKET: 3.4 POWDER ORAL at 09:03

## 2020-03-10 RX ADMIN — LOPERAMIDE HYDROCHLORIDE 4 MG: 2 CAPSULE ORAL at 09:03

## 2020-03-10 RX ADMIN — DIPHENHYDRAMINE HYDROCHLORIDE 6.25 MG: 50 INJECTION INTRAMUSCULAR; INTRAVENOUS at 11:03

## 2020-03-10 RX ADMIN — DRONABINOL 2.5 MG: 2.5 CAPSULE ORAL at 06:03

## 2020-03-10 RX ADMIN — MAGNESIUM OXIDE 400 MG: 400 TABLET ORAL at 09:03

## 2020-03-10 RX ADMIN — PSYLLIUM HUSK 2 PACKET: 3.4 POWDER ORAL at 02:03

## 2020-03-10 RX ADMIN — SODIUM CHLORIDE, SODIUM LACTATE, POTASSIUM CHLORIDE, AND CALCIUM CHLORIDE: .6; .31; .03; .02 INJECTION, SOLUTION INTRAVENOUS at 11:03

## 2020-03-10 RX ADMIN — DRONABINOL 2.5 MG: 2.5 CAPSULE ORAL at 05:03

## 2020-03-10 NOTE — PHYSICIAN QUERY
PT Name: Shara Hutchins  MR #: [id number]     Physician Query Form - Documentation Clarification      CDS/: Marti Busch               Contact information:    This form is a permanent document in the medical record.     Query Date: March 10, 2020    By submitting this query, we are merely seeking further clarification of documentation. Please utilize your independent clinical judgment when addressing the question(s) below.    The Medical record reflects the following:  Wound care nurse documented Stage 2 decubitus of the left buttock.    Attending documented-  Unstageable &pressure injury of skin and tissue                                                                            Doctor, Please specify below if you agree that the pt has a stage 2 left buttock.    Provider Use Only    [ x  ] YES    [   ] NO                                                                                                                                 [  ] Clinically Undetermined

## 2020-03-10 NOTE — PLAN OF CARE
Problem: Parenteral Nutrition  Goal: Effective Intravenous Nutrition Therapy Delivery  Outcome: Ongoing, Progressing  Intervention: Optimize Intravenous Nutrition Delivery  Flowsheets (Taken 3/10/2020 1320)  Nutrition Support Management: parenteral nutrition composition adjusted

## 2020-03-10 NOTE — PLAN OF CARE
03/10/20 0954   Post-Acute Status   Post-Acute Authorization Placement   Post-Acute Placement Status Referrals Sent       CM reached out to [facility]([phone])  advantage to seek help in finding ltac for pt (spoke to Shanna) pt on TPN,  [facility] gave me a case Number of [id number]  And packet was sent to [facility] via right fax [phone].    CM sent packet to [facility] via right fax [phone].  CM to follow    CM spoke to Eufemia with [facility], she will look at this and let me know.  CM to follow

## 2020-03-10 NOTE — PHYSICIAN QUERY
PT Name: Shara Hutchins  MR #: [id number]    Physician Query Form - Nutrition Clarification     CDS/: Marti Busch               Contact information:     This form is a permanent document in the medical record.     Query Date: March 10, 2020    By submitting this query, we are merely seeking further clarification of documentation.. Please utilize your independent clinical judgment when addressing the question(s) below.    The Medical record contains the following:  Attending PN-  Malnutrition    RD consult-  Severe malnutrition    AND / ASPEN Clinical Characteristics (October 2011)  A minimum of two characteristics is recommended for diagnosing either moderate or severe malnutrition   Mild Malnutrition Moderate Malnutrition Severe Malnutrition   Energy Intake from p.o., TF or TPN. < 75% intake of estimated energy needs for less than 7 days < 75% intake of estimated energy needs for greater than 7 days < 50% intake of estimated energy needs for > 5 days   Weight Loss 1-2% in 1 month  5% in 3 months  7.5% in 6 months  10% in 1 year 1-2 % in 1 week  5% in 1 month  7.5% in 3 months  10% in 6 months  20% in 1 year > 2% in 1 week  > 5% in 1 month  > 7.5% in 3 months  > 10% in 6 months  > 20% in 1 year   Physical Findings     None *Mild subcutaneous fat and/or muscle loss  *Mild fluid accumulation  *Stage II decubitus  *Surgical wound or non-healing wound *Mod/severe subcutaneous fat and/or muscle loss  *Mod/severe fluid accumulation  *Stage III or IV decubitus  *Non-healing surgical wound     Provider, please document the severity of the pt's documented malnutrition.    [  ] Mild Protein-Calorie Malnutrition   [x  ] Moderate Protein-Calorie Malnutrition   [  ] Severe Protein-Calorie Malnutrition   [  ] Clinically Undetermined       Please document in your progress notes daily for the duration of treatment until resolved and include in your discharge summary.

## 2020-03-10 NOTE — PROGRESS NOTES
[facility]  Adult Nutrition   Progress Note (Nutrition Support Management)    SUMMARY     Recommendations  Recommendation/Intervention: 1. New TPN to be initiated today @ 1700, run at 65 ml/hr. 2. Blood glucose and electrolytes to be monitored and corrected accordingly to sliding scale. 3. RD to monitor PO intake, TPN infusion and tolerance. Will continue to monitor and manage nutrition support daily.   Goals: 1. Patient to meet estimated needs via TPN provision. 2. Blood glucose and electrolyte to trendtowards normal limits.    Nutrition Goal Status: progressing towards goal    Dietitian Rounds Brief  TPN to continue. New TPN ordered.      PARENTERAL NUTRITION PROGRESS NOTE:    Parenteral Nutrition Day # 10  Diagnosis/Indication for PN: high ostomy output, severe malnutrition   IV Access: PICC  Diet/Tube Feedin calorie diabetic diet          Admixture Type: 3-in-1 (15% AA, 70% dextrose, 20% smoflipid)   Infusion Rate and Frequency: 65   Patient Acuity: Acute Care      PN Composition:   96 grams Amino Acid, 301 grams Dextrose, and 60 grams Lipid.    Today's TPN provides 2007 kcal.     Please see order for electrolytes and additives content and adjustments.   *The Nutrition Support Team will continue to monitor electrolytes daily and adjust parenteral nutrition as warranted.    Reason for Assessment  Reason For Assessment: new TPN, RD follow-up  Interdisciplinary Rounds: attended    Nutrition Risk Screen  Nutrition Risk Screen: tube feeding or parenteral nutrition       Ileostomy 20 1600 Loop RLQ-Wound Image: Images linked       Pressure Injury 20 1837 Left Buttocks Stage 2-Wound Image: Images linked  MST Score: 0  Have you recently lost weight without trying?: No(not since coming to hospital)  Weight loss score: 0  Have you been eating poorly because of a decreased appetite?: No  Appetite score: 0       Nutrition/Diet History  Spiritual, Cultural Beliefs, Anabaptist Practices, Values  "that Affect Care: yes(pt states she is Rastafari, she believes in God)  Food Allergies: NKFA  Factors Affecting Nutritional Intake: malabsorption, altered gastrointestinal function  Nutrition Support Formula Prior to Admit: Other (see comments)(Custom TPN )    Anthropometrics  Temp: 98.5 °F (36.9 °C)  Height Method: Stated  Height: 5' 7.5" (171.5 cm)  Height (inches): 67.5 in  Weight Method: Bed Scale  Weight: 63.5 kg (139 lb 15.9 oz)  Weight (lb): 139.99 lb  Ideal Body Weight (IBW), Female: 137.5 lb  % Ideal Body Weight, Female (lb): 101.81 %  BMI (Calculated): 21.6  BMI Grade: 18.5-24.9 - normal       Weight History:  Wt Readings from Last 10 Encounters:   03/08/20 63.5 kg (139 lb 15.9 oz)   03/06/20 53 kg (116 lb 13.5 oz)   03/07/20 52.6 kg (116 lb)   03/03/20 53 kg (116 lb 13.5 oz)   02/19/20 58.7 kg (129 lb 4.8 oz)   02/19/20 58.8 kg (129 lb 9.6 oz)   02/14/20 56.7 kg (125 lb)   02/05/20 56.9 kg (125 lb 6.4 oz)   01/22/20 57.2 kg (126 lb)   01/08/20 61.4 kg (135 lb 6.4 oz)       Lab/Procedures/Meds: Pertinent Labs Reviewed  Clinical Chemistry:  Recent Labs   Lab 03/08/20  0621 03/09/20  0243  03/10/20  0530    140  --  141   K 3.3* 3.3*   < > 3.5    109  --  110   CO2 29 27  --  28   * 127*  --  102   BUN 25* 27*  --  31*   CREATININE 1.9* 1.7*  --  1.6*   CALCIUM 7.8* 7.5*  --  7.6*   PROT 4.3* 4.1*  --  4.9*   ALBUMIN 2.2* 2.1*  --  2.5*   BILITOT 1.1* 1.4*  --  1.0   ALKPHOS 189* 173*  --  201*   AST 20 18  --  20   ALT 14 13  --  13   ANIONGAP 4* 4*  --  3*   ESTGFRAFRICA 30.6* 35.0*  --  37.6*   EGFRNONAA 26.5* 30.3*  --  32.6*   MG 2.0 2.1  --  2.1   PHOS 3.0 2.6*  --  2.2*    < > = values in this interval not displayed.     CBC:   Recent Labs   Lab 03/10/20  0530   WBC 5.64   RBC 2.91*   HGB 8.9*   HCT 28.6*   *   MCV 98   MCH 30.6   MCHC 31.1*     Lipid Panel:  Recent Labs   Lab 03/09/20  0243   TRIG 80     Medications: Pertinent Medications reviewed  Scheduled Meds:   " cholestyramine-aspartame  1 packet Oral BID    diphenhydrAMINE  6.25 mg Intravenous Q6H    dronabinoL  2.5 mg Oral BID AC    levothyroxine  88 mcg Oral Before breakfast    loperamide  4 mg Oral QID    magnesium oxide  400 mg Oral BID    pantoprazole  40 mg Intravenous BID    psyllium husk (with sugar)  2 packet Oral TID    sertraline  50 mg Oral Daily     Continuous Infusions:   dextrose 10 % in water (D10W)      lactated ringers 125 mL/hr at 03/09/20 0117    octreotide (SANDOSTATIN) infusion 25 mcg/hr (03/09/20 2133)    TPN ADULT CENTRAL LINE CUSTOM (3 in 1) 65 mL/hr at 03/09/20 1923    TPN ADULT CENTRAL LINE CUSTOM (3 in 1)       PRN Meds:.sodium chloride, acetaminophen, calcium chloride IVPB, calcium chloride IVPB, calcium chloride IVPB, calcium chloride IVPB, calcium chloride IVPB, calcium chloride IVPB, dextrose 10 % in water (D10W), dextrose 50%, dextrose 50%, glucagon (human recombinant), glucose, glucose, HYDROcodone-acetaminophen, magnesium oxide, magnesium oxide, magnesium sulfate IVPB, magnesium sulfate IVPB, magnesium sulfate IVPB, magnesium sulfate IVPB, magnesium sulfate IVPB, magnesium sulfate IVPB, magnesium sulfate IVPB, magnesium sulfate IVPB, melatonin, ondansetron, potassium chloride in water, potassium chloride in water, potassium chloride in water, potassium chloride in water, potassium chloride in water, potassium chloride in water, potassium chloride in water, potassium chloride in water, potassium chloride, potassium chloride, potassium chloride, potassium chloride, potassium chloride, potassium chloride, potassium chloride, potassium chloride, sodium chloride 0.9%, sodium phosphate IVPB, sodium phosphate IVPB, sodium phosphate IVPB, sodium phosphate IVPB, sodium phosphate IVPB, sodium phosphate IVPB, sodium phosphate IVPB, sodium phosphate IVPB, sodium phosphate IVPB, sodium phosphate IVPB    Estimated/Assessed Needs  Weight Used For Calorie Calculations: 63.5 kg (139 lb 15.9  oz)  Energy Calorie Requirements (kcal): 1905 - 2223 (30 - 35)   Energy Need Method: Kcal/kg  Protein Requirements: 95 - 127 (1.5 - 2 g/kg)   Weight Used For Protein Calculations: 63.5 kg (139 lb 15.9 oz)     Estimated Fluid Requirement Method: RDA Method  RDA Method (mL): 1905       Nutrition Prescription Ordered  Current Diet Order: 2200 calorie diabetic diet   Current Nutrition Support Formula Ordered: Other (Comment)(custom TPN )  Current Nutrition Support Rate Ordered: 65 (ml)    Evaluation of Received Nutrient/Fluid Intake  Parenteral Calories (kcal): 2007  Parenteral Protein (gm): 96  Parenteral Fluid (mL): 1560  Energy Calories Required: meeting needs  Protein Required: meeting needs  Fluid Required: meeting needs  Tolerance: tolerating     Intake/Output Summary (Last 24 hours) at 3/10/2020 1321  Last data filed at 3/10/2020 1300  Gross per 24 hour   Intake 1880 ml   Output 2000 ml   Net -120 ml      Nutrition Risk  Level of Risk/Frequency of Follow-up: high     Monitor and Evaluation  Food and Nutrient Intake: food and beverage intake, parenteral nutrition intake, energy intake  Food and Nutrient Adminstration: enteral and parenteral nutrition administration, diet order  Physical Activity and Function: nutrition-related ADLs and IADLs, factors affecting access to physical activity  Anthropometric Measurements: body mass index, weight change, weight  Biochemical Data, Medical Tests and Procedures: gastrointestinal profile, glucose/endocrine profile, inflammatory profile, lipid profile, electrolyte and renal panel  Nutrition-Focused Physical Findings: overall appearance     Nutrition Follow-Up  RD Follow-up?: Yes

## 2020-03-10 NOTE — PLAN OF CARE
03/10/20 1557   Discharge Assessment   Assessment Type Discharge Planning Assessment   Confirmed/corrected address and phone number on facesheet? Yes   Assessment information obtained from? Patient   Communicated expected length of stay with patient/caregiver no   Prior to hospitilization cognitive status: Alert/Oriented   Prior to hospitalization functional status: Needs Assistance   Current cognitive status: Alert/Oriented   Current Functional Status: Needs Assistance   Facility Arrived From: home   Lives With alone   Able to Return to Prior Arrangements yes   Is patient able to care for self after discharge? No   Patient's perception of discharge disposition home health   Readmission Within the Last 30 Days current reason for admission unrelated to previous admission   If yes, most recent facility name: [facility]   Patient currently being followed by outpatient case management? Yes   If yes, name of outpatient case management following: insurance company assigned oupatient case management   Patient currently receives any other outside agency services? Yes   Name and contact number of agency or person providing outside services [facility] home health   Is it the patient/care giver preference to resume care with the current outside agency? Yes   Do you have any problems affording any of your prescribed medications? No   Is the patient taking medications as prescribed? yes   Does the patient have transportation home? Yes   Transportation Anticipated family or friend will provide   Dialysis Name and Scheduled days n/a   Does the patient receive services at the [facility]? No   Discharge Plan A Long-term acute care facility (LTAC)   Discharge Plan B Skilled Nursing Facility   DME Needed Upon Discharge  none   Patient/Family in Agreement with Plan yes

## 2020-03-10 NOTE — SUBJECTIVE & OBJECTIVE
Interval History:  Patient reports feeling well.  Has been eating well.  Having improved and decreased output from the ostomy.  Developing edema in her lower and upper extremities.  Lasix ordered.  Renal function continues to improve.  Awaiting for LTAC placement.  Will need TPN and octreotide for 60 days.    Review of Systems   Constitutional: Negative for chills, fatigue, fever and unexpected weight change.   HENT: Negative for sore throat.    Eyes: Negative for visual disturbance.   Respiratory: Negative for cough, chest tightness and shortness of breath.    Cardiovascular: Negative for chest pain.   Gastrointestinal: Negative for abdominal pain, constipation, diarrhea, nausea and vomiting.   Endocrine: Negative for polyuria.   Genitourinary: Negative for dysuria and hematuria.   Neurological: Negative for headaches.     Objective:     Vital Signs (Most Recent):  Temp: 98.4 °F (36.9 °C) (03/10/20 1615)  Pulse: 69 (03/10/20 1615)  Resp: 18 (03/10/20 1615)  BP: 133/63 (03/10/20 1615)  SpO2: (!) 94 % (03/10/20 1615) Vital Signs (24h Range):  Temp:  [98.2 °F (36.8 °C)-98.7 °F (37.1 °C)] 98.4 °F (36.9 °C)  Pulse:  [63-81] 69  Resp:  [16-18] 18  SpO2:  [94 %-96 %] 94 %  BP: (128-167)/(58-86) 133/63     Weight: 63.5 kg (139 lb 15.9 oz)  Body mass index is 21.6 kg/m².    Intake/Output Summary (Last 24 hours) at 3/10/2020 1656  Last data filed at 3/10/2020 1503  Gross per 24 hour   Intake 1880 ml   Output 2400 ml   Net -520 ml      Physical Exam   Constitutional: She is oriented to person, place, and time. No distress.   HENT:   Head: Normocephalic and atraumatic.   Right Ear: External ear normal.   Left Ear: External ear normal.   Eyes: Conjunctivae and EOM are normal. Right eye exhibits no discharge. Left eye exhibits no discharge.   Neck: Normal range of motion.   Cardiovascular: Normal rate and regular rhythm.   No murmur heard.  Pulmonary/Chest: Effort normal and breath sounds normal. No respiratory distress.   S/p  port   Abdominal: Soft. She exhibits no distension. There is no tenderness.   S/p ostomy   Genitourinary:   Genitourinary Comments: lee   Musculoskeletal: Normal range of motion.   Neurological: She is alert and oriented to person, place, and time.   Skin: She is not diaphoretic.   Psychiatric: She has a normal mood and affect. Her behavior is normal. Judgment and thought content normal.       Significant Labs:   CBC:   Recent Labs   Lab 03/09/20  0243 03/10/20  0530   WBC 3.84* 5.64   HGB 8.1* 8.9*   HCT 25.9* 28.6*   * 115*     CMP:   Recent Labs   Lab 03/09/20  0243 03/09/20  1257 03/10/20  0530     --  141   K 3.3* 3.5 3.5     --  110   CO2 27  --  28   *  --  102   BUN 27*  --  31*   CREATININE 1.7*  --  1.6*   CALCIUM 7.5*  --  7.6*   PROT 4.1*  --  4.9*   ALBUMIN 2.1*  --  2.5*   BILITOT 1.4*  --  1.0   ALKPHOS 173*  --  201*   AST 18  --  20   ALT 13  --  13   ANIONGAP 4*  --  3*   EGFRNONAA 30.3*  --  32.6*

## 2020-03-10 NOTE — PROGRESS NOTES
[facility] Medicine  Progress Note    Patient Name: Shara Hutchins  MRN: [id number]  Patient Class: IP- Inpatient   Admission Date: 3/7/2020  Length of Stay: 1 days  Attending Physician: Grant Ravi MD  Primary Care Provider: April Horton MD        Subjective:     Principal Problem:Malnutrition      Interval History:  Patient reports feeling well.  Has been eating well.  Having improved and decreased output from the ostomy.  Developing edema in her lower and upper extremities.  Lasix ordered.  Renal function continues to improve.  Awaiting for LTAC placement.  Will need TPN and octreotide for 60 days.    Review of Systems   Constitutional: Negative for chills, fatigue, fever and unexpected weight change.   HENT: Negative for sore throat.    Eyes: Negative for visual disturbance.   Respiratory: Negative for cough, chest tightness and shortness of breath.    Cardiovascular: Negative for chest pain.   Gastrointestinal: Negative for abdominal pain, constipation, diarrhea, nausea and vomiting.   Endocrine: Negative for polyuria.   Genitourinary: Negative for dysuria and hematuria.   Neurological: Negative for headaches.     Objective:     Vital Signs (Most Recent):  Temp: 98.4 °F (36.9 °C) (03/10/20 1615)  Pulse: 69 (03/10/20 1615)  Resp: 18 (03/10/20 1615)  BP: 133/63 (03/10/20 1615)  SpO2: (!) 94 % (03/10/20 1615) Vital Signs (24h Range):  Temp:  [98.2 °F (36.8 °C)-98.7 °F (37.1 °C)] 98.4 °F (36.9 °C)  Pulse:  [63-81] 69  Resp:  [16-18] 18  SpO2:  [94 %-96 %] 94 %  BP: (128-167)/(58-86) 133/63     Weight: 63.5 kg (139 lb 15.9 oz)  Body mass index is 21.6 kg/m².    Intake/Output Summary (Last 24 hours) at 3/10/2020 1656  Last data filed at 3/10/2020 1503  Gross per 24 hour   Intake 1880 ml   Output 2400 ml   Net -520 ml      Physical Exam   Constitutional: She is oriented to person, place, and time. No distress.   HENT:   Head: Normocephalic and atraumatic.   Right Ear: External ear normal.    Left Ear: External ear normal.   Eyes: Conjunctivae and EOM are normal. Right eye exhibits no discharge. Left eye exhibits no discharge.   Neck: Normal range of motion.   Cardiovascular: Normal rate and regular rhythm.   No murmur heard.  Pulmonary/Chest: Effort normal and breath sounds normal. No respiratory distress.   S/p port   Abdominal: Soft. She exhibits no distension. There is no tenderness.   S/p ostomy   Genitourinary:   Genitourinary Comments: lee   Musculoskeletal: Normal range of motion.   Neurological: She is alert and oriented to person, place, and time.   Skin: She is not diaphoretic.   Psychiatric: She has a normal mood and affect. Her behavior is normal. Judgment and thought content normal.       Significant Labs:   CBC:   Recent Labs   Lab 03/09/20  0243 03/10/20  0530   WBC 3.84* 5.64   HGB 8.1* 8.9*   HCT 25.9* 28.6*   * 115*     CMP:   Recent Labs   Lab 03/09/20  0243 03/09/20  1257 03/10/20  0530     --  141   K 3.3* 3.5 3.5     --  110   CO2 27  --  28   *  --  102   BUN 27*  --  31*   CREATININE 1.7*  --  1.6*   CALCIUM 7.5*  --  7.6*   PROT 4.1*  --  4.9*   ALBUMIN 2.1*  --  2.5*   BILITOT 1.4*  --  1.0   ALKPHOS 173*  --  201*   AST 18  --  20   ALT 13  --  13   ANIONGAP 4*  --  3*   EGFRNONAA 30.3*  --  32.6*       Assessment/Plan:      Active Hospital Problems    Diagnosis    *Malnutrition    Chronic diarrhea    Acute renal failure superimposed on stage 5 chronic kidney disease, not on chronic dialysis    Unstageable pressure injury of skin and tissue    DNR (do not resuscitate)    S/P ileostomy    Hypothyroidism    Anemia of chronic disease    Iron deficiency anemia due to chronic blood loss       * Malnutrition  Continue TPN, octreotide  Awaiting for LTAC placement    Unstageable pressure injury of skin and tissue  Wound care ordered      Iron deficiency anemia due to chronic blood loss  Transfuse 1 unit of prbc's      VTE Risk Mitigation (From  admission, onward)         Ordered     Place sequential compression device  Until discontinued      03/08/20 0133                      Grant Ravi MD  Department of Hospital Medicine   [facility]  Date of service: 03/10/2020

## 2020-03-11 LAB
ALBUMIN SERPL BCP-MCNC: 2.4 G/DL (ref 3.5–5.2)
ALP SERPL-CCNC: 196 U/L (ref 55–135)
ALT SERPL W/O P-5'-P-CCNC: 13 U/L (ref 10–44)
ANION GAP SERPL CALC-SCNC: 9 MMOL/L (ref 8–16)
AST SERPL-CCNC: 23 U/L (ref 10–40)
BASOPHILS # BLD AUTO: 0.02 K/UL (ref 0–0.2)
BASOPHILS NFR BLD: 0.3 % (ref 0–1.9)
BILIRUB SERPL-MCNC: 1 MG/DL (ref 0.1–1)
BUN SERPL-MCNC: 31 MG/DL (ref 8–23)
CALCIUM SERPL-MCNC: 7.9 MG/DL (ref 8.7–10.5)
CHLORIDE SERPL-SCNC: 105 MMOL/L (ref 95–110)
CO2 SERPL-SCNC: 27 MMOL/L (ref 23–29)
CREAT SERPL-MCNC: 1.6 MG/DL (ref 0.5–1.4)
DIFFERENTIAL METHOD: ABNORMAL
EOSINOPHIL # BLD AUTO: 0.3 K/UL (ref 0–0.5)
EOSINOPHIL NFR BLD: 4.5 % (ref 0–8)
ERYTHROCYTE [DISTWIDTH] IN BLOOD BY AUTOMATED COUNT: 17.4 % (ref 11.5–14.5)
EST. GFR  (AFRICAN AMERICAN): 37.6 ML/MIN/1.73 M^2
EST. GFR  (NON AFRICAN AMERICAN): 32.6 ML/MIN/1.73 M^2
GLUCOSE SERPL-MCNC: 110 MG/DL (ref 70–110)
GLUCOSE SERPL-MCNC: 119 MG/DL (ref 70–110)
GLUCOSE SERPL-MCNC: 123 MG/DL (ref 70–110)
GLUCOSE SERPL-MCNC: 126 MG/DL (ref 70–110)
GLUCOSE SERPL-MCNC: 135 MG/DL (ref 70–110)
HCT VFR BLD AUTO: 26.5 % (ref 37–48.5)
HGB BLD-MCNC: 8.3 G/DL (ref 12–16)
IMM GRANULOCYTES # BLD AUTO: 0.07 K/UL (ref 0–0.04)
IMM GRANULOCYTES NFR BLD AUTO: 1.1 % (ref 0–0.5)
LYMPHOCYTES # BLD AUTO: 1.2 K/UL (ref 1–4.8)
LYMPHOCYTES NFR BLD: 19.1 % (ref 18–48)
MAGNESIUM SERPL-MCNC: 2 MG/DL (ref 1.6–2.6)
MCH RBC QN AUTO: 30.9 PG (ref 27–31)
MCHC RBC AUTO-ENTMCNC: 31.3 G/DL (ref 32–36)
MCV RBC AUTO: 99 FL (ref 82–98)
MONOCYTES # BLD AUTO: 0.6 K/UL (ref 0.3–1)
MONOCYTES NFR BLD: 9.5 % (ref 4–15)
NEUTROPHILS # BLD AUTO: 4.1 K/UL (ref 1.8–7.7)
NEUTROPHILS NFR BLD: 65.5 % (ref 38–73)
NRBC BLD-RTO: 0 /100 WBC
PHOSPHATE SERPL-MCNC: 2.5 MG/DL (ref 2.7–4.5)
PLATELET # BLD AUTO: 102 K/UL (ref 150–350)
PMV BLD AUTO: 12.4 FL (ref 9.2–12.9)
POTASSIUM SERPL-SCNC: 3.3 MMOL/L (ref 3.5–5.1)
PROT SERPL-MCNC: 4.8 G/DL (ref 6–8.4)
RBC # BLD AUTO: 2.69 M/UL (ref 4–5.4)
SODIUM SERPL-SCNC: 141 MMOL/L (ref 136–145)
WBC # BLD AUTO: 6.29 K/UL (ref 3.9–12.7)

## 2020-03-11 PROCEDURE — B4185 PARENTERAL SOL 10 GM LIPIDS: HCPCS | Performed by: FAMILY MEDICINE

## 2020-03-11 PROCEDURE — 25000003 PHARM REV CODE 250: Performed by: INTERNAL MEDICINE

## 2020-03-11 PROCEDURE — 80053 COMPREHEN METABOLIC PANEL: CPT

## 2020-03-11 PROCEDURE — C9113 INJ PANTOPRAZOLE SODIUM, VIA: HCPCS | Performed by: INTERNAL MEDICINE

## 2020-03-11 PROCEDURE — 63600175 PHARM REV CODE 636 W HCPCS: Performed by: INTERNAL MEDICINE

## 2020-03-11 PROCEDURE — 85025 COMPLETE CBC W/AUTO DIFF WBC: CPT

## 2020-03-11 PROCEDURE — 25000003 PHARM REV CODE 250: Performed by: FAMILY MEDICINE

## 2020-03-11 PROCEDURE — 83735 ASSAY OF MAGNESIUM: CPT

## 2020-03-11 PROCEDURE — 63600175 PHARM REV CODE 636 W HCPCS: Performed by: FAMILY MEDICINE

## 2020-03-11 PROCEDURE — 84100 ASSAY OF PHOSPHORUS: CPT

## 2020-03-11 PROCEDURE — A4217 STERILE WATER/SALINE, 500 ML: HCPCS | Performed by: FAMILY MEDICINE

## 2020-03-11 PROCEDURE — 12000002 HC ACUTE/MED SURGE SEMI-PRIVATE ROOM

## 2020-03-11 RX ORDER — NAPROXEN 250 MG/1
500 TABLET ORAL ONCE
Status: COMPLETED | OUTPATIENT
Start: 2020-03-11 | End: 2020-03-11

## 2020-03-11 RX ADMIN — CHOLESTYRAMINE 4 G: 4 POWDER, FOR SUSPENSION ORAL at 09:03

## 2020-03-11 RX ADMIN — LOPERAMIDE HYDROCHLORIDE 4 MG: 2 CAPSULE ORAL at 09:03

## 2020-03-11 RX ADMIN — SODIUM CHLORIDE, SODIUM LACTATE, POTASSIUM CHLORIDE, AND CALCIUM CHLORIDE: .6; .31; .03; .02 INJECTION, SOLUTION INTRAVENOUS at 08:03

## 2020-03-11 RX ADMIN — PANTOPRAZOLE SODIUM 40 MG: 40 INJECTION, POWDER, LYOPHILIZED, FOR SOLUTION INTRAVENOUS at 09:03

## 2020-03-11 RX ADMIN — HYDROCODONE BITARTRATE AND ACETAMINOPHEN 1 TABLET: 5; 325 TABLET ORAL at 11:03

## 2020-03-11 RX ADMIN — PANTOPRAZOLE SODIUM 40 MG: 40 INJECTION, POWDER, LYOPHILIZED, FOR SOLUTION INTRAVENOUS at 08:03

## 2020-03-11 RX ADMIN — CHOLESTYRAMINE 4 G: 4 POWDER, FOR SUSPENSION ORAL at 08:03

## 2020-03-11 RX ADMIN — DIPHENHYDRAMINE HYDROCHLORIDE 6.25 MG: 50 INJECTION INTRAMUSCULAR; INTRAVENOUS at 06:03

## 2020-03-11 RX ADMIN — LEVOTHYROXINE SODIUM 88 MCG: 88 TABLET ORAL at 06:03

## 2020-03-11 RX ADMIN — MAGNESIUM OXIDE 400 MG: 400 TABLET ORAL at 09:03

## 2020-03-11 RX ADMIN — LOPERAMIDE HYDROCHLORIDE 4 MG: 2 CAPSULE ORAL at 05:03

## 2020-03-11 RX ADMIN — POTASSIUM CHLORIDE: 2 INJECTION, SOLUTION, CONCENTRATE INTRAVENOUS at 05:03

## 2020-03-11 RX ADMIN — NAPROXEN 500 MG: 250 TABLET ORAL at 10:03

## 2020-03-11 RX ADMIN — DRONABINOL 2.5 MG: 2.5 CAPSULE ORAL at 05:03

## 2020-03-11 RX ADMIN — DIPHENHYDRAMINE HYDROCHLORIDE 6.25 MG: 50 INJECTION INTRAMUSCULAR; INTRAVENOUS at 11:03

## 2020-03-11 RX ADMIN — MAGNESIUM OXIDE 400 MG: 400 TABLET ORAL at 08:03

## 2020-03-11 RX ADMIN — OCTREOTIDE ACETATE 25 MCG/HR: 500 INJECTION, SOLUTION INTRAVENOUS; SUBCUTANEOUS at 11:03

## 2020-03-11 RX ADMIN — PSYLLIUM HUSK 2 PACKET: 3.4 POWDER ORAL at 08:03

## 2020-03-11 RX ADMIN — DIPHENHYDRAMINE HYDROCHLORIDE 6.25 MG: 50 INJECTION INTRAMUSCULAR; INTRAVENOUS at 05:03

## 2020-03-11 RX ADMIN — SERTRALINE HYDROCHLORIDE 50 MG: 50 TABLET ORAL at 09:03

## 2020-03-11 RX ADMIN — LOPERAMIDE HYDROCHLORIDE 4 MG: 2 CAPSULE ORAL at 02:03

## 2020-03-11 RX ADMIN — LOPERAMIDE HYDROCHLORIDE 4 MG: 2 CAPSULE ORAL at 08:03

## 2020-03-11 RX ADMIN — HYDROCODONE BITARTRATE AND ACETAMINOPHEN 1 TABLET: 5; 325 TABLET ORAL at 03:03

## 2020-03-11 RX ADMIN — DRONABINOL 2.5 MG: 2.5 CAPSULE ORAL at 06:03

## 2020-03-11 RX ADMIN — PSYLLIUM HUSK 2 PACKET: 3.4 POWDER ORAL at 02:03

## 2020-03-11 RX ADMIN — PSYLLIUM HUSK 2 PACKET: 3.4 POWDER ORAL at 09:03

## 2020-03-11 NOTE — PLAN OF CARE
03/11/20 1234   Post-Acute Status   Post-Acute Authorization Placement   Post-Acute Placement Status (!) Delay between Facility and Payor       [facility] and [facility] still working out contract CM to follow

## 2020-03-11 NOTE — PLAN OF CARE
Problem: Parenteral Nutrition  Goal: Effective Intravenous Nutrition Therapy Delivery  Outcome: Ongoing, Progressing  Intervention: Optimize Intravenous Nutrition Delivery  Flowsheets (Taken 3/11/2020 1152)  Nutrition Support Management: parenteral nutrition composition adjusted

## 2020-03-11 NOTE — NURSING
PT WATCHING T.V.  PT MEDICATED FOR PAIN.  1500 CC URINE OUTPUT THIS SHIFT EMPTIED FROM [facility].  DISCUSSED LASIX WITH PT.

## 2020-03-11 NOTE — PLAN OF CARE
03/11/20 1545   Post-Acute Status   Post-Acute Authorization Placement   Post-Acute Placement Status (!) Delay between Facility and Payor       [facility] does not have LTAC  In network, they are trying to work out a deal with [facility] in [place], [facility] and [facility] still in negotiations for a price DON at [facility] writing letters to [facility]. CM to follow.

## 2020-03-11 NOTE — PROGRESS NOTES
[facility]  Progress Note    Patient Name: Shara Hutchins  MRN: [id number]  Patient Class: IP- Inpatient   Admission Date: 3/7/2020  Length of Stay: 2 days  Attending Physician: Grant Ravi MD  Primary Care Provider: April Hotron MD        Subjective:     Principal Problem:Malnutrition      Interval History:  Patient reports feeling well.  Edema in her extremities has improved after Lasix.  Complains of mild headache.  Reports that she is tolerating diet and has decreased output from the ostomy bag.  Continues to be on TPN for nutritional support.  More formed stool in the ostomy.  Discussed case with GI and previous attending.  Discontinue octreotide.  Awaiting for LTAC placement.  Discussed with general surgery who suspects that patient is having short gut syndrome.  Plan for reversed ostomy in several weeks.    Review of Systems   Constitutional: Negative for chills, fatigue, fever and unexpected weight change.   HENT: Negative for sore throat.    Eyes: Negative for visual disturbance.   Respiratory: Negative for cough, chest tightness and shortness of breath.    Cardiovascular: Negative for chest pain.   Gastrointestinal: Negative for abdominal pain, constipation, diarrhea, nausea and vomiting.   Endocrine: Negative for polyuria.   Genitourinary: Negative for dysuria and hematuria.   Neurological: Negative for headaches.     Objective:     Vital Signs (Most Recent):  Temp: 98.5 °F (36.9 °C) (03/11/20 1158)  Pulse: 80 (03/11/20 1158)  Resp: 16 (03/11/20 1158)  BP: (!) 136/55 (03/11/20 1158)  SpO2: (!) 93 % (03/11/20 1158) Vital Signs (24h Range):  Temp:  [98.5 °F (36.9 °C)-99.5 °F (37.5 °C)] 98.5 °F (36.9 °C)  Pulse:  [69-82] 80  Resp:  [16] 16  SpO2:  [92 %-95 %] 93 %  BP: (128-153)/(51-71) 136/55     Weight: 63.5 kg (139 lb 15.9 oz)  Body mass index is 21.6 kg/m².    Intake/Output Summary (Last 24 hours) at 3/11/2020 1632  Last data filed at 3/11/2020 1424  Gross per 24  hour   Intake 28801.08 ml   Output 72440 ml   Net -2194.92 ml      Physical Exam   Constitutional: She is oriented to person, place, and time. No distress.   HENT:   Head: Normocephalic and atraumatic.   Right Ear: External ear normal.   Left Ear: External ear normal.   Eyes: Conjunctivae and EOM are normal. Right eye exhibits no discharge. Left eye exhibits no discharge.   Neck: Normal range of motion.   Cardiovascular: Normal rate and regular rhythm.   No murmur heard.  Pulmonary/Chest: Effort normal and breath sounds normal. No respiratory distress.   S/p port   Abdominal: Soft. She exhibits no distension. There is no tenderness.   S/p ostomy   Genitourinary:   Genitourinary Comments: lee   Musculoskeletal: Normal range of motion.   Neurological: She is alert and oriented to person, place, and time.   Skin: She is not diaphoretic.   Psychiatric: She has a normal mood and affect. Her behavior is normal. Judgment and thought content normal.       Significant Labs:   CBC:   Recent Labs   Lab 03/10/20  0530 03/11/20  0349   WBC 5.64 6.29   HGB 8.9* 8.3*   HCT 28.6* 26.5*   * 102*     CMP:   Recent Labs   Lab 03/10/20  0530 03/11/20  0349    141   K 3.5 3.3*    105   CO2 28 27    110   BUN 31* 31*   CREATININE 1.6* 1.6*   CALCIUM 7.6* 7.9*   PROT 4.9* 4.8*   ALBUMIN 2.5* 2.4*   BILITOT 1.0 1.0   ALKPHOS 201* 196*   AST 20 23   ALT 13 13   ANIONGAP 3* 9   EGFRNONAA 32.6* 32.6*       Assessment/Plan:      Active Hospital Problems    Diagnosis    *Malnutrition    Chronic diarrhea    Acute renal failure superimposed on stage 5 chronic kidney disease, not on chronic dialysis    Unstageable pressure injury of skin and tissue    DNR (do not resuscitate)    S/P ileostomy    Hypothyroidism    Anemia of chronic disease    Iron deficiency anemia due to chronic blood loss       * Malnutrition  Continue TPN until ostomy output decreased  D/c Octreotide  Awaiting for LTAC  placement    Unstageable pressure injury of skin and tissue  Wound care ordered      Iron deficiency anemia due to chronic blood loss  Transfuse 1 unit of prbc's      VTE Risk Mitigation (From admission, onward)         Ordered     Place sequential compression device  Until discontinued      03/08/20 0133                      Grant Ravi MD  Department of Hospital Medicine   [facility]  Date of service: 03/11/2020

## 2020-03-11 NOTE — PLAN OF CARE
Problem: Adult Inpatient Plan of Care  Goal: Plan of Care Review  Outcome: Ongoing, Progressing  Goal: Optimal Comfort and Wellbeing  Outcome: Ongoing, Progressing  Goal: Readiness for Transition of Care  Outcome: Ongoing, Progressing     Problem: Infection  Goal: Infection Symptom Resolution  Outcome: Ongoing, Progressing     Problem: Wound  Goal: Optimal Wound Healing  Outcome: Ongoing, Progressing     Problem: Fall Injury Risk  Goal: Absence of Fall and Fall-Related Injury  Outcome: Ongoing, Progressing     Problem: Skin Injury Risk Increased  Goal: Skin Health and Integrity  Outcome: Ongoing, Progressing

## 2020-03-11 NOTE — PROGRESS NOTES
[facility]  Adult Nutrition   Progress Note (Nutrition Support Management)    SUMMARY     Recommendations  Recommendation/Intervention:   1. New TPN to be initiated today @ 1700, run at 65 ml/hr.   2. Blood glucose and electrolytes to be monitored and corrected accordingly to sliding scale.   3. RD to monitor PO intake, TPN infusion and tolerance. Monitor I/Os. Will continue to monitor and manage nutrition support daily.   Goals:   1. Patient to meet estimated needs via TPN provision.   2. Blood glucose and electrolyte to trendtowards normal limits.    Nutrition Goal Status: progressing towards goal    Dietitian Rounds Brief  TPN to continue. New TPN ordered. Edema noted and patient getting lasix. Will monitor hydration status. Patient is eating well, but ostomy output remains high, at least 1000 ml over past 24 hours per documentation of I/Os. RD will discuss with RN and MD; recommend consideration of adjusting IVF (LRs @ 125 ml/hr) and/or holding patient NPO until output is minimized.     PARENTERAL NUTRITION PROGRESS NOTE:    Parenteral Nutrition Day # 11  Diagnosis/Indication for PN: high ostomy output, severe malnutrition   IV Access: PICC  Diet/Tube Feedin calorie diabetic diet          Admixture Type: 3-in-1 (15% AA, 70% dextrose, 20% smoflipid)   Infusion Rate and Frequency: 65   Patient Acuity: Acute Care      PN Composition:   96 grams Amino Acid, 301 grams Dextrose, and 60 grams Lipid.    Today's TPN provides 2007 kcal.     Please see order for electrolytes and additives content and adjustments.   *The Nutrition Support Team will continue to monitor electrolytes daily and adjust parenteral nutrition as warranted.    Reason for Assessment  Reason For Assessment: new TPN, RD follow-up  Interdisciplinary Rounds: attended    Nutrition Risk Screen  Nutrition Risk Screen: tube feeding or parenteral nutrition       Ileostomy 20 1600 Loop RLQ-Wound Image: Images linked       Pressure  "Injury 02/27/20 1837 Left Buttocks Stage 2-Wound Image: Images linked  MST Score: 0  Have you recently lost weight without trying?: No(not since coming to hospital)  Weight loss score: 0  Have you been eating poorly because of a decreased appetite?: No  Appetite score: 0       Nutrition/Diet History  Spiritual, Cultural Beliefs, Zoroastrianism Practices, Values that Affect Care: yes(pt states she is Congregation, she believes in God)  Food Allergies: NKFA  Factors Affecting Nutritional Intake: malabsorption, altered gastrointestinal function  Nutrition Support Formula Prior to Admit: Other (see comments)(Custom TPN )    Anthropometrics  Temp: 98.6 °F (37 °C)  Height Method: Stated  Height: 5' 7.5" (171.5 cm)  Height (inches): 67.5 in  Weight Method: Bed Scale  Weight: 63.5 kg (139 lb 15.9 oz)  Weight (lb): 139.99 lb  Ideal Body Weight (IBW), Female: 137.5 lb  % Ideal Body Weight, Female (lb): 101.81 %  BMI (Calculated): 21.6  BMI Grade: 18.5-24.9 - normal       Weight History:  Wt Readings from Last 10 Encounters:   03/08/20 63.5 kg (139 lb 15.9 oz)   03/06/20 53 kg (116 lb 13.5 oz)   03/07/20 52.6 kg (116 lb)   03/03/20 53 kg (116 lb 13.5 oz)   02/19/20 58.7 kg (129 lb 4.8 oz)   02/19/20 58.8 kg (129 lb 9.6 oz)   02/14/20 56.7 kg (125 lb)   02/05/20 56.9 kg (125 lb 6.4 oz)   01/22/20 57.2 kg (126 lb)   01/08/20 61.4 kg (135 lb 6.4 oz)       Lab/Procedures/Meds: Pertinent Labs Reviewed  Clinical Chemistry:  Recent Labs   Lab 03/09/20  0243  03/10/20  0530 03/11/20  0349     --  141 141   K 3.3*   < > 3.5 3.3*     --  110 105   CO2 27  --  28 27   *  --  102 110   BUN 27*  --  31* 31*   CREATININE 1.7*  --  1.6* 1.6*   CALCIUM 7.5*  --  7.6* 7.9*   PROT 4.1*  --  4.9* 4.8*   ALBUMIN 2.1*  --  2.5* 2.4*   BILITOT 1.4*  --  1.0 1.0   ALKPHOS 173*  --  201* 196*   AST 18  --  20 23   ALT 13  --  13 13   ANIONGAP 4*  --  3* 9   ESTGFRAFRICA 35.0*  --  37.6* 37.6*   EGFRNONAA 30.3*  --  32.6* 32.6*   MG 2.1  -- "  2.1 2.0   PHOS 2.6*  --  2.2* 2.5*    < > = values in this interval not displayed.     CBC:   Recent Labs   Lab 03/11/20  0349   WBC 6.29   RBC 2.69*   HGB 8.3*   HCT 26.5*   *   MCV 99*   MCH 30.9   MCHC 31.3*     Lipid Panel:  Recent Labs   Lab 03/09/20  0243   TRIG 80     Medications: Pertinent Medications reviewed  Scheduled Meds:   cholestyramine-aspartame  1 packet Oral BID    diphenhydrAMINE  6.25 mg Intravenous Q6H    dronabinoL  2.5 mg Oral BID AC    levothyroxine  88 mcg Oral Before breakfast    loperamide  4 mg Oral QID    magnesium oxide  400 mg Oral BID    pantoprazole  40 mg Intravenous BID    psyllium husk (with sugar)  2 packet Oral TID    sertraline  50 mg Oral Daily     Continuous Infusions:   dextrose 10 % in water (D10W)      lactated ringers 125 mL/hr at 03/10/20 2357    octreotide (SANDOSTATIN) infusion 25 mcg/hr (03/10/20 1343)    TPN ADULT CENTRAL LINE CUSTOM (3 in 1) 65 mL/hr at 03/10/20 1711    TPN ADULT CENTRAL LINE CUSTOM (3 in 1)       PRN Meds:.sodium chloride, acetaminophen, calcium chloride IVPB, calcium chloride IVPB, calcium chloride IVPB, calcium chloride IVPB, calcium chloride IVPB, calcium chloride IVPB, dextrose 10 % in water (D10W), dextrose 50%, dextrose 50%, glucagon (human recombinant), glucose, glucose, HYDROcodone-acetaminophen, magnesium oxide, magnesium oxide, magnesium sulfate IVPB, magnesium sulfate IVPB, magnesium sulfate IVPB, magnesium sulfate IVPB, magnesium sulfate IVPB, magnesium sulfate IVPB, magnesium sulfate IVPB, magnesium sulfate IVPB, melatonin, ondansetron, potassium chloride in water, potassium chloride in water, potassium chloride in water, potassium chloride in water, potassium chloride in water, potassium chloride in water, potassium chloride in water, potassium chloride in water, potassium chloride, potassium chloride, potassium chloride, potassium chloride, potassium chloride, potassium chloride, potassium chloride, potassium  chloride, sodium chloride 0.9%, sodium phosphate IVPB, sodium phosphate IVPB, sodium phosphate IVPB, sodium phosphate IVPB, sodium phosphate IVPB, sodium phosphate IVPB, sodium phosphate IVPB, sodium phosphate IVPB, sodium phosphate IVPB, sodium phosphate IVPB    Estimated/Assessed Needs  Weight Used For Calorie Calculations: 63.5 kg (139 lb 15.9 oz)  Energy Calorie Requirements (kcal): 1905 - 2223 (30 - 35)   Energy Need Method: Kcal/kg  Protein Requirements: 95 - 127 (1.5 - 2 g/kg)   Weight Used For Protein Calculations: 63.5 kg (139 lb 15.9 oz)     Estimated Fluid Requirement Method: RDA Method  RDA Method (mL): 1905       Nutrition Prescription Ordered  Current Diet Order: 2200 calorie diabetic diet   Current Nutrition Support Formula Ordered: Other (Comment)(custom TPN )  Current Nutrition Support Rate Ordered: 65 (ml)    Evaluation of Received Nutrient/Fluid Intake  Parenteral Calories (kcal): 2007  Parenteral Protein (gm): 96  Parenteral Fluid (mL): 1560  Energy Calories Required: meeting needs  Protein Required: meeting needs  Fluid Required: meeting needs  Comments: TPN to continue. New TPN ordered.   Tolerance: tolerating     Intake/Output Summary (Last 24 hours) at 3/11/2020 1149  Last data filed at 3/11/2020 0700  Gross per 24 hour   Intake 19245.08 ml   Output 03334 ml   Net -144.92 ml      % Intake of Estimated Energy Needs: 75 - 100 %  % Meal Intake: 75 - 100 %    Nutrition Risk  Level of Risk/Frequency of Follow-up: high     Monitor and Evaluation  Food and Nutrient Intake: food and beverage intake, parenteral nutrition intake, energy intake  Food and Nutrient Adminstration: enteral and parenteral nutrition administration, diet order  Physical Activity and Function: nutrition-related ADLs and IADLs, factors affecting access to physical activity  Anthropometric Measurements: body mass index, weight change, weight  Biochemical Data, Medical Tests and Procedures: gastrointestinal profile,  glucose/endocrine profile, inflammatory profile, lipid profile, electrolyte and renal panel  Nutrition-Focused Physical Findings: overall appearance     Nutrition Follow-Up  RD Follow-up?: Yes    Sandra Lovell RD 03/11/2020 12:03 PM

## 2020-03-11 NOTE — SUBJECTIVE & OBJECTIVE
Interval History:  Patient reports feeling well.  Edema in her extremities has improved after Lasix.  Complains of mild headache.  Reports that she is tolerating diet and has decreased output from the ostomy bag.  Continues to be on TPN for nutritional support.  More formed stool in the ostomy.  Discussed case with GI and previous attending.  Discontinue octreotide.  Awaiting for LTAC placement.  Discussed with general surgery who suspects that patient is having short gut syndrome.  Plan for reversed ostomy in several weeks.    Review of Systems   Constitutional: Negative for chills, fatigue, fever and unexpected weight change.   HENT: Negative for sore throat.    Eyes: Negative for visual disturbance.   Respiratory: Negative for cough, chest tightness and shortness of breath.    Cardiovascular: Negative for chest pain.   Gastrointestinal: Negative for abdominal pain, constipation, diarrhea, nausea and vomiting.   Endocrine: Negative for polyuria.   Genitourinary: Negative for dysuria and hematuria.   Neurological: Negative for headaches.     Objective:     Vital Signs (Most Recent):  Temp: 98.5 °F (36.9 °C) (03/11/20 1158)  Pulse: 80 (03/11/20 1158)  Resp: 16 (03/11/20 1158)  BP: (!) 136/55 (03/11/20 1158)  SpO2: (!) 93 % (03/11/20 1158) Vital Signs (24h Range):  Temp:  [98.5 °F (36.9 °C)-99.5 °F (37.5 °C)] 98.5 °F (36.9 °C)  Pulse:  [69-82] 80  Resp:  [16] 16  SpO2:  [92 %-95 %] 93 %  BP: (128-153)/(51-71) 136/55     Weight: 63.5 kg (139 lb 15.9 oz)  Body mass index is 21.6 kg/m².    Intake/Output Summary (Last 24 hours) at 3/11/2020 1632  Last data filed at 3/11/2020 1424  Gross per 24 hour   Intake 96638.08 ml   Output 78510 ml   Net -2194.92 ml      Physical Exam   Constitutional: She is oriented to person, place, and time. No distress.   HENT:   Head: Normocephalic and atraumatic.   Right Ear: External ear normal.   Left Ear: External ear normal.   Eyes: Conjunctivae and EOM are normal. Right eye exhibits no  discharge. Left eye exhibits no discharge.   Neck: Normal range of motion.   Cardiovascular: Normal rate and regular rhythm.   No murmur heard.  Pulmonary/Chest: Effort normal and breath sounds normal. No respiratory distress.   S/p port   Abdominal: Soft. She exhibits no distension. There is no tenderness.   S/p ostomy   Genitourinary:   Genitourinary Comments: lee   Musculoskeletal: Normal range of motion.   Neurological: She is alert and oriented to person, place, and time.   Skin: She is not diaphoretic.   Psychiatric: She has a normal mood and affect. Her behavior is normal. Judgment and thought content normal.       Significant Labs:   CBC:   Recent Labs   Lab 03/10/20  0530 03/11/20  0349   WBC 5.64 6.29   HGB 8.9* 8.3*   HCT 28.6* 26.5*   * 102*     CMP:   Recent Labs   Lab 03/10/20  0530 03/11/20  0349    141   K 3.5 3.3*    105   CO2 28 27    110   BUN 31* 31*   CREATININE 1.6* 1.6*   CALCIUM 7.6* 7.9*   PROT 4.9* 4.8*   ALBUMIN 2.5* 2.4*   BILITOT 1.0 1.0   ALKPHOS 201* 196*   AST 20 23   ALT 13 13   ANIONGAP 3* 9   EGFRNONAA 32.6* 32.6*

## 2020-03-12 LAB
ALBUMIN SERPL BCP-MCNC: 2.3 G/DL (ref 3.5–5.2)
ALP SERPL-CCNC: 181 U/L (ref 55–135)
ALT SERPL W/O P-5'-P-CCNC: 12 U/L (ref 10–44)
ANION GAP SERPL CALC-SCNC: 7 MMOL/L (ref 8–16)
AST SERPL-CCNC: 21 U/L (ref 10–40)
BASOPHILS # BLD AUTO: 0.02 K/UL (ref 0–0.2)
BASOPHILS NFR BLD: 0.3 % (ref 0–1.9)
BILIRUB SERPL-MCNC: 1.1 MG/DL (ref 0.1–1)
BUN SERPL-MCNC: 31 MG/DL (ref 8–23)
CALCIUM SERPL-MCNC: 7.7 MG/DL (ref 8.7–10.5)
CHLORIDE SERPL-SCNC: 107 MMOL/L (ref 95–110)
CO2 SERPL-SCNC: 25 MMOL/L (ref 23–29)
CREAT SERPL-MCNC: 1.6 MG/DL (ref 0.5–1.4)
DIFFERENTIAL METHOD: ABNORMAL
EOSINOPHIL # BLD AUTO: 0.4 K/UL (ref 0–0.5)
EOSINOPHIL NFR BLD: 6.1 % (ref 0–8)
ERYTHROCYTE [DISTWIDTH] IN BLOOD BY AUTOMATED COUNT: 17.4 % (ref 11.5–14.5)
EST. GFR  (AFRICAN AMERICAN): 37.6 ML/MIN/1.73 M^2
EST. GFR  (NON AFRICAN AMERICAN): 32.6 ML/MIN/1.73 M^2
GLUCOSE SERPL-MCNC: 107 MG/DL (ref 70–110)
GLUCOSE SERPL-MCNC: 129 MG/DL (ref 70–110)
GLUCOSE SERPL-MCNC: 343 MG/DL (ref 70–110)
HCT VFR BLD AUTO: 26.7 % (ref 37–48.5)
HGB BLD-MCNC: 8.3 G/DL (ref 12–16)
IMM GRANULOCYTES # BLD AUTO: 0.08 K/UL (ref 0–0.04)
IMM GRANULOCYTES NFR BLD AUTO: 1.2 % (ref 0–0.5)
LYMPHOCYTES # BLD AUTO: 1.2 K/UL (ref 1–4.8)
LYMPHOCYTES NFR BLD: 17.5 % (ref 18–48)
MAGNESIUM SERPL-MCNC: 2.5 MG/DL (ref 1.6–2.6)
MCH RBC QN AUTO: 31.2 PG (ref 27–31)
MCHC RBC AUTO-ENTMCNC: 31.1 G/DL (ref 32–36)
MCV RBC AUTO: 100 FL (ref 82–98)
MONOCYTES # BLD AUTO: 0.5 K/UL (ref 0.3–1)
MONOCYTES NFR BLD: 7 % (ref 4–15)
NEUTROPHILS # BLD AUTO: 4.6 K/UL (ref 1.8–7.7)
NEUTROPHILS NFR BLD: 67.9 % (ref 38–73)
NRBC BLD-RTO: 0 /100 WBC
PHOSPHATE SERPL-MCNC: 4.4 MG/DL (ref 2.7–4.5)
PLATELET # BLD AUTO: 111 K/UL (ref 150–350)
PMV BLD AUTO: 12.5 FL (ref 9.2–12.9)
POTASSIUM SERPL-SCNC: 4.8 MMOL/L (ref 3.5–5.1)
PROT SERPL-MCNC: 4.6 G/DL (ref 6–8.4)
RBC # BLD AUTO: 2.66 M/UL (ref 4–5.4)
SODIUM SERPL-SCNC: 139 MMOL/L (ref 136–145)
WBC # BLD AUTO: 6.76 K/UL (ref 3.9–12.7)

## 2020-03-12 PROCEDURE — 25000003 PHARM REV CODE 250: Performed by: INTERNAL MEDICINE

## 2020-03-12 PROCEDURE — 85025 COMPLETE CBC W/AUTO DIFF WBC: CPT

## 2020-03-12 PROCEDURE — 83735 ASSAY OF MAGNESIUM: CPT

## 2020-03-12 PROCEDURE — C9113 INJ PANTOPRAZOLE SODIUM, VIA: HCPCS | Performed by: INTERNAL MEDICINE

## 2020-03-12 PROCEDURE — A4217 STERILE WATER/SALINE, 500 ML: HCPCS | Performed by: FAMILY MEDICINE

## 2020-03-12 PROCEDURE — 80053 COMPREHEN METABOLIC PANEL: CPT

## 2020-03-12 PROCEDURE — 25000003 PHARM REV CODE 250: Performed by: FAMILY MEDICINE

## 2020-03-12 PROCEDURE — 84100 ASSAY OF PHOSPHORUS: CPT

## 2020-03-12 PROCEDURE — B4185 PARENTERAL SOL 10 GM LIPIDS: HCPCS | Performed by: FAMILY MEDICINE

## 2020-03-12 PROCEDURE — 63600175 PHARM REV CODE 636 W HCPCS: Performed by: INTERNAL MEDICINE

## 2020-03-12 PROCEDURE — 63600175 PHARM REV CODE 636 W HCPCS: Performed by: FAMILY MEDICINE

## 2020-03-12 PROCEDURE — 12000002 HC ACUTE/MED SURGE SEMI-PRIVATE ROOM

## 2020-03-12 RX ORDER — FUROSEMIDE 10 MG/ML
20 INJECTION INTRAMUSCULAR; INTRAVENOUS ONCE
Status: COMPLETED | OUTPATIENT
Start: 2020-03-12 | End: 2020-03-12

## 2020-03-12 RX ADMIN — HYDROCODONE BITARTRATE AND ACETAMINOPHEN 1 TABLET: 5; 325 TABLET ORAL at 12:03

## 2020-03-12 RX ADMIN — HYDROCODONE BITARTRATE AND ACETAMINOPHEN 1 TABLET: 5; 325 TABLET ORAL at 09:03

## 2020-03-12 RX ADMIN — DRONABINOL 2.5 MG: 2.5 CAPSULE ORAL at 04:03

## 2020-03-12 RX ADMIN — LEVOTHYROXINE SODIUM 88 MCG: 88 TABLET ORAL at 06:03

## 2020-03-12 RX ADMIN — PSYLLIUM HUSK 2 PACKET: 3.4 POWDER ORAL at 09:03

## 2020-03-12 RX ADMIN — PANTOPRAZOLE SODIUM 40 MG: 40 INJECTION, POWDER, LYOPHILIZED, FOR SOLUTION INTRAVENOUS at 09:03

## 2020-03-12 RX ADMIN — POTASSIUM CHLORIDE: 2 INJECTION, SOLUTION, CONCENTRATE INTRAVENOUS at 05:03

## 2020-03-12 RX ADMIN — MAGNESIUM OXIDE 400 MG: 400 TABLET ORAL at 09:03

## 2020-03-12 RX ADMIN — DIPHENHYDRAMINE HYDROCHLORIDE 6.25 MG: 50 INJECTION INTRAMUSCULAR; INTRAVENOUS at 11:03

## 2020-03-12 RX ADMIN — HYDROCODONE BITARTRATE AND ACETAMINOPHEN 1 TABLET: 5; 325 TABLET ORAL at 11:03

## 2020-03-12 RX ADMIN — LOPERAMIDE HYDROCHLORIDE 4 MG: 2 CAPSULE ORAL at 02:03

## 2020-03-12 RX ADMIN — DIPHENHYDRAMINE HYDROCHLORIDE 6.25 MG: 50 INJECTION INTRAMUSCULAR; INTRAVENOUS at 06:03

## 2020-03-12 RX ADMIN — DIPHENHYDRAMINE HYDROCHLORIDE 6.25 MG: 50 INJECTION INTRAMUSCULAR; INTRAVENOUS at 12:03

## 2020-03-12 RX ADMIN — CHOLESTYRAMINE 4 G: 4 POWDER, FOR SUSPENSION ORAL at 09:03

## 2020-03-12 RX ADMIN — LOPERAMIDE HYDROCHLORIDE 4 MG: 2 CAPSULE ORAL at 09:03

## 2020-03-12 RX ADMIN — DIPHENHYDRAMINE HYDROCHLORIDE 6.25 MG: 50 INJECTION INTRAMUSCULAR; INTRAVENOUS at 04:03

## 2020-03-12 RX ADMIN — LOPERAMIDE HYDROCHLORIDE 4 MG: 2 CAPSULE ORAL at 04:03

## 2020-03-12 RX ADMIN — SERTRALINE HYDROCHLORIDE 50 MG: 50 TABLET ORAL at 09:03

## 2020-03-12 RX ADMIN — FUROSEMIDE 20 MG: 10 INJECTION, SOLUTION INTRAMUSCULAR; INTRAVENOUS at 07:03

## 2020-03-12 RX ADMIN — DRONABINOL 2.5 MG: 2.5 CAPSULE ORAL at 06:03

## 2020-03-12 RX ADMIN — PSYLLIUM HUSK 2 PACKET: 3.4 POWDER ORAL at 02:03

## 2020-03-12 NOTE — ASSESSMENT & PLAN NOTE
Continue TPN until ostomy output decreased  D/c Octreotide  May require monthly injections octreotide if worsening diarrhea  On TPN for nutrition support with suspected short gut syndrome  Will need reversal of ostomy with General surgery in several weeks  Awaiting for LTAC placement  PT/OT  Unable to give chemical DVT prophylaxis due to thrombocytopenia

## 2020-03-12 NOTE — PROGRESS NOTES
[facility]  Progress Note    Patient Name: Shara Hutchins  MRN: [id number]  Patient Class: IP- Inpatient   Admission Date: 3/7/2020  Length of Stay: 3 days  Attending Physician: Grant Ravi MD  Primary Care Provider: April Horton MD        Subjective:     Principal Problem:Chronic diarrhea      Interval History:  Patient reports feeling well.  Tolerating diet.  Noticed to have more watery output to her ostomy bag since off octreotide.  May require monthly IM injection octreotide.  Continues to be on TPN for nutritional support.  Awaiting for LTAC placement.    Review of Systems   Constitutional: Negative for chills, fatigue, fever and unexpected weight change.   HENT: Negative for sore throat.    Eyes: Negative for visual disturbance.   Respiratory: Negative for cough, chest tightness and shortness of breath.    Cardiovascular: Negative for chest pain.   Gastrointestinal: Negative for abdominal pain, constipation, diarrhea, nausea and vomiting.   Endocrine: Negative for polyuria.   Genitourinary: Negative for dysuria and hematuria.   Neurological: Negative for headaches.     Objective:     Vital Signs (Most Recent):  Temp: 98.4 °F (36.9 °C) (03/12/20 1143)  Pulse: 74 (03/12/20 1143)  Resp: 15 (03/12/20 1143)  BP: (!) 156/73 (03/12/20 1143)  SpO2: (!) 93 % (03/12/20 1143) Vital Signs (24h Range):  Temp:  [98.3 °F (36.8 °C)-98.9 °F (37.2 °C)] 98.4 °F (36.9 °C)  Pulse:  [68-81] 74  Resp:  [15-17] 15  SpO2:  [90 %-94 %] 93 %  BP: (133-163)/(56-78) 156/73     Weight: 63.5 kg (139 lb 15.9 oz)  Body mass index is 21.6 kg/m².    Intake/Output Summary (Last 24 hours) at 3/12/2020 1525  Last data filed at 3/12/2020 1352  Gross per 24 hour   Intake 4505.34 ml   Output 7075 ml   Net -2569.66 ml      Physical Exam   Constitutional: She is oriented to person, place, and time. No distress.   HENT:   Head: Normocephalic and atraumatic.   Right Ear: External ear normal.   Left Ear: External ear  normal.   Eyes: Conjunctivae and EOM are normal. Right eye exhibits no discharge. Left eye exhibits no discharge.   Neck: Normal range of motion.   Cardiovascular: Normal rate and regular rhythm.   No murmur heard.  Pulmonary/Chest: Effort normal and breath sounds normal. No respiratory distress.   S/p port   Abdominal: Soft. She exhibits no distension. There is no tenderness.   S/p ostomy   Genitourinary:   Genitourinary Comments: lee   Musculoskeletal: Normal range of motion.   Neurological: She is alert and oriented to person, place, and time.   Skin: She is not diaphoretic.   Psychiatric: She has a normal mood and affect. Her behavior is normal. Judgment and thought content normal.       Significant Labs:   CBC:   Recent Labs   Lab 03/11/20 0349 03/12/20  0441   WBC 6.29 6.76   HGB 8.3* 8.3*   HCT 26.5* 26.7*   * 111*     CMP:   Recent Labs   Lab 03/11/20 0349 03/12/20 0441    139   K 3.3* 4.8    107   CO2 27 25    343*   BUN 31* 31*   CREATININE 1.6* 1.6*   CALCIUM 7.9* 7.7*   PROT 4.8* 4.6*   ALBUMIN 2.4* 2.3*   BILITOT 1.0 1.1*   ALKPHOS 196* 181*   AST 23 21   ALT 13 12   ANIONGAP 9 7*   EGFRNONAA 32.6* 32.6*           Assessment/Plan:      Active Hospital Problems    Diagnosis    *Chronic diarrhea    Malnutrition    Acute renal failure superimposed on stage 5 chronic kidney disease, not on chronic dialysis    Unstageable pressure injury of skin and tissue    DNR (do not resuscitate)    S/P ileostomy    Hypothyroidism    Anemia of chronic disease    Iron deficiency anemia due to chronic blood loss       Malnutrition  Continue TPN until ostomy output decreased  D/c Octreotide  May require monthly injections octreotide if worsening diarrhea  On TPN for nutrition support with suspected short gut syndrome  Will need reversal of ostomy with General surgery in several weeks  Awaiting for LTAC placement  PT/OT  Unable to give chemical DVT prophylaxis due to  thrombocytopenia    Unstageable pressure injury of skin and tissue  Wound care ordered      Iron deficiency anemia due to chronic blood loss  Transfuse 1 unit of prbc's      VTE Risk Mitigation (From admission, onward)         Ordered     Place sequential compression device  Until discontinued      03/08/20 0133                      Grant Ravi MD  Department of Hospital Medicine   Replaced by [facility]  Date of service: 03/12/2020

## 2020-03-12 NOTE — NURSING
"Wound care done this shift to buttocks.  Pt tolerated well.  Pt states buttock is swollen around wound cause she has "had cellulitis for so long."    "

## 2020-03-12 NOTE — PLAN OF CARE
Problem: Adult Inpatient Plan of Care  Goal: Plan of Care Review  Outcome: Ongoing, Progressing     Problem: Infection  Goal: Infection Symptom Resolution  Outcome: Ongoing, Progressing     Problem: Wound  Goal: Optimal Wound Healing  Outcome: Ongoing, Progressing     Problem: Fall Injury Risk  Goal: Absence of Fall and Fall-Related Injury  Outcome: Ongoing, Progressing     Problem: Skin Injury Risk Increased  Goal: Skin Health and Integrity  Outcome: Ongoing, Progressing     Problem: Parenteral Nutrition  Goal: Effective Intravenous Nutrition Therapy Delivery  Outcome: Ongoing, Progressing

## 2020-03-12 NOTE — SUBJECTIVE & OBJECTIVE
Interval History:  Patient reports feeling well.  Tolerating diet.  Noticed to have more watery output to her ostomy bag since off octreotide.  May require monthly IM injection octreotide.  Continues to be on TPN for nutritional support.  Awaiting for LTAC placement.    Review of Systems   Constitutional: Negative for chills, fatigue, fever and unexpected weight change.   HENT: Negative for sore throat.    Eyes: Negative for visual disturbance.   Respiratory: Negative for cough, chest tightness and shortness of breath.    Cardiovascular: Negative for chest pain.   Gastrointestinal: Negative for abdominal pain, constipation, diarrhea, nausea and vomiting.   Endocrine: Negative for polyuria.   Genitourinary: Negative for dysuria and hematuria.   Neurological: Negative for headaches.     Objective:     Vital Signs (Most Recent):  Temp: 98.4 °F (36.9 °C) (03/12/20 1143)  Pulse: 74 (03/12/20 1143)  Resp: 15 (03/12/20 1143)  BP: (!) 156/73 (03/12/20 1143)  SpO2: (!) 93 % (03/12/20 1143) Vital Signs (24h Range):  Temp:  [98.3 °F (36.8 °C)-98.9 °F (37.2 °C)] 98.4 °F (36.9 °C)  Pulse:  [68-81] 74  Resp:  [15-17] 15  SpO2:  [90 %-94 %] 93 %  BP: (133-163)/(56-78) 156/73     Weight: 63.5 kg (139 lb 15.9 oz)  Body mass index is 21.6 kg/m².    Intake/Output Summary (Last 24 hours) at 3/12/2020 1525  Last data filed at 3/12/2020 1352  Gross per 24 hour   Intake 4505.34 ml   Output 7075 ml   Net -2569.66 ml      Physical Exam   Constitutional: She is oriented to person, place, and time. No distress.   HENT:   Head: Normocephalic and atraumatic.   Right Ear: External ear normal.   Left Ear: External ear normal.   Eyes: Conjunctivae and EOM are normal. Right eye exhibits no discharge. Left eye exhibits no discharge.   Neck: Normal range of motion.   Cardiovascular: Normal rate and regular rhythm.   No murmur heard.  Pulmonary/Chest: Effort normal and breath sounds normal. No respiratory distress.   S/p port   Abdominal: Soft. She  yes exhibits no distension. There is no tenderness.   S/p ostomy   Genitourinary:   Genitourinary Comments: lee   Musculoskeletal: Normal range of motion.   Neurological: She is alert and oriented to person, place, and time.   Skin: She is not diaphoretic.   Psychiatric: She has a normal mood and affect. Her behavior is normal. Judgment and thought content normal.       Significant Labs:   CBC:   Recent Labs   Lab 03/11/20 0349 03/12/20  0441   WBC 6.29 6.76   HGB 8.3* 8.3*   HCT 26.5* 26.7*   * 111*     CMP:   Recent Labs   Lab 03/11/20 0349 03/12/20  0441    139   K 3.3* 4.8    107   CO2 27 25    343*   BUN 31* 31*   CREATININE 1.6* 1.6*   CALCIUM 7.9* 7.7*   PROT 4.8* 4.6*   ALBUMIN 2.4* 2.3*   BILITOT 1.0 1.1*   ALKPHOS 196* 181*   AST 23 21   ALT 13 12   ANIONGAP 9 7*   EGFRNONAA 32.6* 32.6*

## 2020-03-12 NOTE — PLAN OF CARE
Problem: Parenteral Nutrition  Goal: Effective Intravenous Nutrition Therapy Delivery  Outcome: Ongoing, Progressing  Intervention: Optimize Intravenous Nutrition Delivery  Flowsheets (Taken 3/12/2020 1501)  Nutrition Support Management: parenteral nutrition composition adjusted

## 2020-03-12 NOTE — PROGRESS NOTES
Formerly [facility]  Adult Nutrition   Progress Note (Nutrition Support Management)    SUMMARY     Recommendations  Recommendation/Intervention:   1. New TPN to be initiated today @ 1700, run at 65 ml/hr.   2. Blood glucose and electrolytes to be monitored and corrected accordingly to sliding scale.   3. RD to monitor PO intake, TPN infusion and tolerance. Monitor I/Os. Will continue to monitor and manage nutrition support daily.   Goals:   1. Patient to meet estimated needs via TPN provision.   2. Blood glucose and electrolyte to trendtowards normal limits.    Nutrition Goal Status: progressing towards goal     Dietitian Rounds Brief  TPN to continue. New TPN ordered.      PARENTERAL NUTRITION PROGRESS NOTE:    Parenteral Nutrition Day # 12  Diagnosis/Indication for PN: high ostomy output, severe malnutrition   IV Access: PICC  Diet/Tube Feedin calorie diabetic diet          Admixture Type: 3-in-1 (15% AA, 70% dextrose, 20% smoflipid)   Infusion Rate and Frequency: 65   Patient Acuity: Acute Care      PN Composition:   96 grams Amino Acid, 301 grams Dextrose, and 60 grams Lipid.    Today's TPN provides 2007 kcal.     Please see order for electrolytes and additives content and adjustments.   *The Nutrition Support Team will continue to monitor electrolytes daily and adjust parenteral nutrition as warranted.    Reason for Assessment  Reason For Assessment: new TPN, RD follow-up  Interdisciplinary Rounds: attended    Nutrition Risk Screen  Nutrition Risk Screen: tube feeding or parenteral nutrition       Ileostomy 20 1600 Loop RLQ-Wound Image: Images linked       Pressure Injury 20 1837 Left Buttocks Stage 2-Wound Image: Images linked  MST Score: 0  Have you recently lost weight without trying?: No(not since coming to hospital)  Weight loss score: 0  Have you been eating poorly because of a decreased appetite?: No  Appetite score: 0       Nutrition/Diet History  Spiritual, Cultural Beliefs,  "[employer] Practices, Values that Affect Care: yes(pt states she is Mormon, she believes in God)  Food Allergies: NKFA  Factors Affecting Nutritional Intake: altered gastrointestinal function, malabsorption  Nutrition Support Formula Prior to Admit: Other (see comments)(Custom TPN )    Anthropometrics  Temp: 98.4 °F (36.9 °C)  Height Method: Stated  Height: 5' 7.5" (171.5 cm)  Height (inches): 67.5 in  Weight Method: Bed Scale  Weight: 63.5 kg (139 lb 15.9 oz)  Weight (lb): 139.99 lb  Ideal Body Weight (IBW), Female: 137.5 lb  % Ideal Body Weight, Female (lb): 101.81 %  BMI (Calculated): 21.6  BMI Grade: 18.5-24.9 - normal       Weight History:  Wt Readings from Last 10 Encounters:   03/08/20 63.5 kg (139 lb 15.9 oz)   03/06/20 53 kg (116 lb 13.5 oz)   03/07/20 52.6 kg (116 lb)   03/03/20 53 kg (116 lb 13.5 oz)   02/19/20 58.7 kg (129 lb 4.8 oz)   02/19/20 58.8 kg (129 lb 9.6 oz)   02/14/20 56.7 kg (125 lb)   02/05/20 56.9 kg (125 lb 6.4 oz)   01/22/20 57.2 kg (126 lb)   01/08/20 61.4 kg (135 lb 6.4 oz)       Lab/Procedures/Meds: Pertinent Labs Reviewed  Clinical Chemistry:  Recent Labs   Lab 03/10/20  0530 03/11/20  0349 03/12/20  0441    141 139   K 3.5 3.3* 4.8    105 107   CO2 28 27 25    110 343*   BUN 31* 31* 31*   CREATININE 1.6* 1.6* 1.6*   CALCIUM 7.6* 7.9* 7.7*   PROT 4.9* 4.8* 4.6*   ALBUMIN 2.5* 2.4* 2.3*   BILITOT 1.0 1.0 1.1*   ALKPHOS 201* 196* 181*   AST 20 23 21   ALT 13 13 12   ANIONGAP 3* 9 7*   ESTGFRAFRICA 37.6* 37.6* 37.6*   EGFRNONAA 32.6* 32.6* 32.6*   MG 2.1 2.0 2.5   PHOS 2.2* 2.5* 4.4     CBC:   Recent Labs   Lab 03/12/20  0441   WBC 6.76   RBC 2.66*   HGB 8.3*   HCT 26.7*   *   *   MCH 31.2*   MCHC 31.1*     Lipid Panel:  Recent Labs   Lab 03/09/20  0243   TRIG 80     Medications: Pertinent Medications reviewed  Scheduled Meds:   cholestyramine-aspartame  1 packet Oral BID    diphenhydrAMINE  6.25 mg Intravenous Q6H    dronabinoL  2.5 mg Oral BID AC "    levothyroxine  88 mcg Oral Before breakfast    loperamide  4 mg Oral QID    magnesium oxide  400 mg Oral BID    pantoprazole  40 mg Intravenous BID    psyllium husk (with sugar)  2 packet Oral TID    sertraline  50 mg Oral Daily     Continuous Infusions:   dextrose 10 % in water (D10W)      lactated ringers 125 mL/hr at 03/11/20 2041    TPN ADULT CENTRAL LINE CUSTOM (3 in 1) 65 mL/hr at 03/11/20 1716    TPN ADULT CENTRAL LINE CUSTOM (3 in 1)       PRN Meds:.sodium chloride, acetaminophen, calcium chloride IVPB, calcium chloride IVPB, calcium chloride IVPB, calcium chloride IVPB, calcium chloride IVPB, calcium chloride IVPB, dextrose 10 % in water (D10W), dextrose 50%, dextrose 50%, glucagon (human recombinant), glucose, glucose, HYDROcodone-acetaminophen, magnesium oxide, magnesium oxide, magnesium sulfate IVPB, magnesium sulfate IVPB, magnesium sulfate IVPB, magnesium sulfate IVPB, magnesium sulfate IVPB, magnesium sulfate IVPB, magnesium sulfate IVPB, magnesium sulfate IVPB, melatonin, ondansetron, potassium chloride in water, potassium chloride in water, potassium chloride in water, potassium chloride in water, potassium chloride in water, potassium chloride in water, potassium chloride in water, potassium chloride in water, potassium chloride, potassium chloride, potassium chloride, potassium chloride, potassium chloride, potassium chloride, potassium chloride, potassium chloride, sodium chloride 0.9%, sodium phosphate IVPB, sodium phosphate IVPB, sodium phosphate IVPB, sodium phosphate IVPB, sodium phosphate IVPB, sodium phosphate IVPB, sodium phosphate IVPB, sodium phosphate IVPB, sodium phosphate IVPB, sodium phosphate IVPB    Estimated/Assessed Needs  Weight Used For Calorie Calculations: 63.5 kg (139 lb 15.9 oz)  Energy Calorie Requirements (kcal): 1905 - 2223 (30 - 35)   Energy Need Method: Kcal/kg  Protein Requirements: 95 - 127 (1.5 - 2 g/kg)   Weight Used For Protein Calculations: 63.5 kg  (139 lb 15.9 oz)     Estimated Fluid Requirement Method: RDA Method  RDA Method (mL): 1905       Nutrition Prescription Ordered  Current Diet Order: 2200 calorie diabetic diet   Current Nutrition Support Formula Ordered: Other (Comment)([place] TPN )  Current Nutrition Support Rate Ordered: 65 (ml)    Evaluation of Received Nutrient/Fluid Intake  Parenteral Calories (kcal): 2007  Parenteral Protein (gm): 96  Parenteral Fluid (mL): 1560  Energy Calories Required: meeting needs  Protein Required: meeting needs  Fluid Required: meeting needs  Comments: TPN to continue. New TPN ordered.   Tolerance: tolerating     Intake/Output Summary (Last 24 hours) at 3/12/2020 1458  Last data filed at 3/12/2020 1352  Gross per 24 hour   Intake 4505.34 ml   Output 7075 ml   Net -2569.66 ml      Nutrition Risk  Level of Risk/Frequency of Follow-up: high     Monitor and Evaluation  Food and Nutrient Intake: food and beverage intake, energy intake, parenteral nutrition intake  Food and Nutrient Adminstration: diet order, enteral and parenteral nutrition administration  Physical Activity and Function: nutrition-related ADLs and IADLs, factors affecting access to physical activity  Anthropometric Measurements: weight change, weight, body mass index  Biochemical Data, Medical Tests and Procedures: electrolyte and renal panel, glucose/endocrine profile, lipid profile, gastrointestinal profile, inflammatory profile  Nutrition-Focused Physical Findings: overall appearance     Nutrition Follow-Up  RD Follow-up?: Yes     Sandra Lovell RD 03/12/2020 3:01 PM

## 2020-03-13 LAB
ALBUMIN SERPL BCP-MCNC: 2.3 G/DL (ref 3.5–5.2)
ALP SERPL-CCNC: 197 U/L (ref 55–135)
ALT SERPL W/O P-5'-P-CCNC: 13 U/L (ref 10–44)
ANION GAP SERPL CALC-SCNC: 7 MMOL/L (ref 8–16)
AST SERPL-CCNC: 19 U/L (ref 10–40)
BILIRUB SERPL-MCNC: 1.1 MG/DL (ref 0.1–1)
BUN SERPL-MCNC: 34 MG/DL (ref 8–23)
CALCIUM SERPL-MCNC: 7.9 MG/DL (ref 8.7–10.5)
CHLORIDE SERPL-SCNC: 108 MMOL/L (ref 95–110)
CO2 SERPL-SCNC: 27 MMOL/L (ref 23–29)
CREAT SERPL-MCNC: 1.7 MG/DL (ref 0.5–1.4)
EST. GFR  (AFRICAN AMERICAN): 35 ML/MIN/1.73 M^2
EST. GFR  (NON AFRICAN AMERICAN): 30.3 ML/MIN/1.73 M^2
GLUCOSE SERPL-MCNC: 110 MG/DL (ref 70–110)
GLUCOSE SERPL-MCNC: 130 MG/DL (ref 70–110)
GLUCOSE SERPL-MCNC: 157 MG/DL (ref 70–110)
MAGNESIUM SERPL-MCNC: 2.2 MG/DL (ref 1.6–2.6)
PHOSPHATE SERPL-MCNC: 3.7 MG/DL (ref 2.7–4.5)
POTASSIUM SERPL-SCNC: 4.4 MMOL/L (ref 3.5–5.1)
PROCALCITONIN SERPL IA-MCNC: 0.31 NG/ML (ref 0–0.5)
PROT SERPL-MCNC: 4.8 G/DL (ref 6–8.4)
SODIUM SERPL-SCNC: 142 MMOL/L (ref 136–145)

## 2020-03-13 PROCEDURE — 63600175 PHARM REV CODE 636 W HCPCS: Performed by: INTERNAL MEDICINE

## 2020-03-13 PROCEDURE — 97165 OT EVAL LOW COMPLEX 30 MIN: CPT

## 2020-03-13 PROCEDURE — 97162 PT EVAL MOD COMPLEX 30 MIN: CPT

## 2020-03-13 PROCEDURE — 25000003 PHARM REV CODE 250: Performed by: FAMILY MEDICINE

## 2020-03-13 PROCEDURE — A4217 STERILE WATER/SALINE, 500 ML: HCPCS | Performed by: FAMILY MEDICINE

## 2020-03-13 PROCEDURE — 25000003 PHARM REV CODE 250: Performed by: INTERNAL MEDICINE

## 2020-03-13 PROCEDURE — 97116 GAIT TRAINING THERAPY: CPT

## 2020-03-13 PROCEDURE — 94761 N-INVAS EAR/PLS OXIMETRY MLT: CPT

## 2020-03-13 PROCEDURE — 63600175 PHARM REV CODE 636 W HCPCS: Performed by: FAMILY MEDICINE

## 2020-03-13 PROCEDURE — C9113 INJ PANTOPRAZOLE SODIUM, VIA: HCPCS | Performed by: INTERNAL MEDICINE

## 2020-03-13 PROCEDURE — 27000221 HC OXYGEN, UP TO 24 HOURS

## 2020-03-13 PROCEDURE — 84100 ASSAY OF PHOSPHORUS: CPT

## 2020-03-13 PROCEDURE — B4185 PARENTERAL SOL 10 GM LIPIDS: HCPCS | Performed by: FAMILY MEDICINE

## 2020-03-13 PROCEDURE — 12000002 HC ACUTE/MED SURGE SEMI-PRIVATE ROOM

## 2020-03-13 PROCEDURE — 84145 PROCALCITONIN (PCT): CPT

## 2020-03-13 PROCEDURE — 83735 ASSAY OF MAGNESIUM: CPT

## 2020-03-13 PROCEDURE — 80053 COMPREHEN METABOLIC PANEL: CPT

## 2020-03-13 RX ORDER — DIPHENOXYLATE HCL/ATROPINE 2.5-.025/5
5 LIQUID (ML) ORAL 4 TIMES DAILY
Status: DISCONTINUED | OUTPATIENT
Start: 2020-03-13 | End: 2020-03-13

## 2020-03-13 RX ORDER — FUROSEMIDE 10 MG/ML
INJECTION INTRAMUSCULAR; INTRAVENOUS
Status: DISPENSED
Start: 2020-03-13 | End: 2020-03-13

## 2020-03-13 RX ORDER — FUROSEMIDE 10 MG/ML
20 INJECTION INTRAMUSCULAR; INTRAVENOUS ONCE
Status: DISCONTINUED | OUTPATIENT
Start: 2020-03-13 | End: 2020-03-15

## 2020-03-13 RX ORDER — DIPHENOXYLATE HYDROCHLORIDE AND ATROPINE SULFATE 2.5; .025 MG/1; MG/1
1 TABLET ORAL 4 TIMES DAILY
Status: DISCONTINUED | OUTPATIENT
Start: 2020-03-13 | End: 2020-03-27 | Stop reason: HOSPADM

## 2020-03-13 RX ADMIN — DRONABINOL 2.5 MG: 2.5 CAPSULE ORAL at 05:03

## 2020-03-13 RX ADMIN — MAGNESIUM OXIDE 400 MG: 400 TABLET ORAL at 09:03

## 2020-03-13 RX ADMIN — LOPERAMIDE HYDROCHLORIDE 4 MG: 2 CAPSULE ORAL at 09:03

## 2020-03-13 RX ADMIN — PSYLLIUM HUSK 2 PACKET: 3.4 POWDER ORAL at 09:03

## 2020-03-13 RX ADMIN — OCTREOTIDE ACETATE 25 MCG/HR: 500 INJECTION, SOLUTION INTRAVENOUS; SUBCUTANEOUS at 03:03

## 2020-03-13 RX ADMIN — PSYLLIUM HUSK 2 PACKET: 3.4 POWDER ORAL at 03:03

## 2020-03-13 RX ADMIN — CHOLESTYRAMINE 4 G: 4 POWDER, FOR SUSPENSION ORAL at 09:03

## 2020-03-13 RX ADMIN — PANTOPRAZOLE SODIUM 40 MG: 40 INJECTION, POWDER, LYOPHILIZED, FOR SOLUTION INTRAVENOUS at 09:03

## 2020-03-13 RX ADMIN — DIPHENHYDRAMINE HYDROCHLORIDE 6.25 MG: 50 INJECTION INTRAMUSCULAR; INTRAVENOUS at 12:03

## 2020-03-13 RX ADMIN — SERTRALINE HYDROCHLORIDE 50 MG: 50 TABLET ORAL at 09:03

## 2020-03-13 RX ADMIN — MAGNESIUM SULFATE HEPTAHYDRATE: 500 INJECTION, SOLUTION INTRAMUSCULAR; INTRAVENOUS at 05:03

## 2020-03-13 RX ADMIN — HYDROCODONE BITARTRATE AND ACETAMINOPHEN 1 TABLET: 5; 325 TABLET ORAL at 11:03

## 2020-03-13 RX ADMIN — LEVOTHYROXINE SODIUM 88 MCG: 88 TABLET ORAL at 06:03

## 2020-03-13 RX ADMIN — DIPHENHYDRAMINE HYDROCHLORIDE 6.25 MG: 50 INJECTION INTRAMUSCULAR; INTRAVENOUS at 11:03

## 2020-03-13 RX ADMIN — DIPHENHYDRAMINE HYDROCHLORIDE 6.25 MG: 50 INJECTION INTRAMUSCULAR; INTRAVENOUS at 05:03

## 2020-03-13 RX ADMIN — DRONABINOL 2.5 MG: 2.5 CAPSULE ORAL at 06:03

## 2020-03-13 RX ADMIN — DIPHENHYDRAMINE HYDROCHLORIDE 6.25 MG: 50 INJECTION INTRAMUSCULAR; INTRAVENOUS at 06:03

## 2020-03-13 RX ADMIN — HYDROCODONE BITARTRATE AND ACETAMINOPHEN 1 TABLET: 5; 325 TABLET ORAL at 06:03

## 2020-03-13 RX ADMIN — LOPERAMIDE HYDROCHLORIDE 4 MG: 2 CAPSULE ORAL at 01:03

## 2020-03-13 RX ADMIN — HYDROCODONE BITARTRATE AND ACETAMINOPHEN 1 TABLET: 5; 325 TABLET ORAL at 05:03

## 2020-03-13 RX ADMIN — DIPHENOXYLATE HYDROCHLORIDE AND ATROPINE SULFATE 1 TABLET: 2.5; .025 TABLET ORAL at 09:03

## 2020-03-13 RX ADMIN — LOPERAMIDE HYDROCHLORIDE 4 MG: 2 CAPSULE ORAL at 05:03

## 2020-03-13 RX ADMIN — ACETAMINOPHEN 650 MG: 325 TABLET ORAL at 09:03

## 2020-03-13 NOTE — PLAN OF CARE
03/13/20 0738   Patient Assessment/Suction   Level of Consciousness (AVPU) alert   Respiratory Effort Normal;Unlabored   Expansion/Accessory Muscles/Retractions no use of accessory muscles   PRE-TX-O2   O2 Device (Oxygen Therapy) nasal cannula   $ Is the patient on Low Flow Oxygen? Yes   Flow (L/min) 2   SpO2 (!) 94 %   Pulse Oximetry Type Intermittent   $ Pulse Oximetry - Multiple Charge Pulse Oximetry - Multiple   Pulse 84   Resp 18

## 2020-03-13 NOTE — PT/OT/SLP EVAL
"Occupational Therapy   Evaluation    Name: Shara Hutchins  MRN: [id number]  Admitting Diagnosis:  Chronic diarrhea      Recommendations:     Discharge Recommendations: LTACH (long-term acute care hospital)  Discharge Equipment Recommendations:  walker, rolling  Barriers to discharge:       Assessment:     Shara Hutchins is a 69 y.o. female with a medical diagnosis of Chronic diarrhea.  She presents with the following performance deficits affecting function: weakness, impaired endurance, impaired self care skills, impaired functional mobilty, gait instability, impaired balance, impaired cardiopulmonary response to activity. Pt seated in chair on OT arrival, agreeable to eval. Pt significantly deconditioned from lengthy hospital stay and plans to d/c to LTAC when cleared medically.    Rehab Prognosis: Good; patient would benefit from acute skilled OT services to address these deficits and reach maximum level of function.       Plan:     Patient to be seen 5 x/week to address the above listed problems via self-care/home management, therapeutic activities, therapeutic exercises  · Plan of Care Expires:    · Plan of Care Reviewed with: patient    Subjective     Chief Complaint: "I was doing everything for myself before all this happened."  Patient/Family Comments/goals: to get stronger    Occupational Profile:  Living Environment: Pt to move in with daughter upon d/c into 1-story house with no SALMA. She will have tub-shower combo in bathroom. Her family has already moved her belongings.  Previous level of function: Independent for ADLs and mobility  Roles and Routines: active, driving  Equipment Used at Home:  none  Assistance upon Discharge: from family    Pain/Comfort:  · Pain Rating 1: 0/10    Patients cultural, spiritual, Advent conflicts given the current situation:      Objective:     Communicated with: nurse prior to session.  Patient found up in chair with lee catheter(ileostomy) upon OT entry to room.    General " Precautions: Standard, fall   Orthopedic Precautions:N/A   Braces: N/A     Occupational Performance:    Functional Mobility/Transfers:  · Patient completed Sit <> Stand Transfer with minimum assistance  with  rolling walker      Activities of Daily Living:  · Grooming: stand by assistance for oral care (including dentures) and to wash face  · Lower Body Dressing: minimum assistance to doff/don socks    Cognitive/Visual Perceptual:  Cognitive/Psychosocial Skills:     -       Oriented to: Person, Place, Time and Situation   -       Safety awareness/insight to disability: intact   -       Mood/Affect/Coping skills/emotional control: Appropriate to situation and Cooperative    Physical Exam:  Balance:    -       static standing: fair  Sensation:    -       Intact  Upper Extremity Range of Motion:     -       Right Upper Extremity: WFL  -       Left Upper Extremity: WFL  Upper Extremity Strength:    -       Right Upper Extremity: WFL  -       Left Upper Extremity: WFL    AMPAC 6 Click ADL:  AMPAC Total Score: 17    Treatment & Education:  Pt educated on OT role/POC  D/C planning  Education:    Patient left up in chair with all lines intact and call button in reach    GOALS:   Multidisciplinary Problems     Occupational Therapy Goals        Problem: Occupational Therapy Goal    Goal Priority Disciplines Outcome Interventions   Occupational Therapy Goal     OT, PT/OT     Description:  Goals to be met by: discharge     Patient will increase functional independence with ADLs by performing:    UE Dressing with Set-up Assistance.  LE Dressing with Set-up Assistance.  Grooming while standing at sink with Supervision.  Upper extremity exercise program x20 reps per handout, with independence.                      History:     Past Medical History:   Diagnosis Date    Chronic kidney disease, stage III (moderate) 1/7/2018    Diabetes mellitus, type 2     Fatigue     Hypertension     Iron deficiency anemia due to chronic blood  loss 1/7/2018    Vulvar cancer 1/7/2019       Past Surgical History:   Procedure Laterality Date    ABDOMINAL SURGERY      BACK SURGERY      carpel tunnel surgery once on each hand      CERVICAL FUSION      x 4    cervix repair      CHOLECYSTECTOMY  2000    DIAGNOSTIC LAPAROSCOPY N/A 2/13/2020    Procedure: LAPAROSCOPY, DIAGNOSTIC;  Surgeon: Robbi Lovell III, MD;  Location: [facility];  Service: General;  Laterality: N/A;    HYSTERECTOMY  1980    ILEOSTOMY N/A 2/13/2020    Procedure: CREATION, ILEOSTOMY Loop;  Surgeon: Robbi Lovell III, MD;  Location: [facility] OR;  Service: General;  Laterality: N/A;    LUMBAR LAMINECTOMY      LYSIS OF ADHESIONS N/A 2/13/2020    Procedure: LYSIS, ADHESIONS;  Surgeon: Robbi Lovell III, MD;  Location: [facility] OR;  Service: General;  Laterality: N/A;    NECK SURGERY      PHLEBOGRAPHY  2/28/2020    Procedure: VENOGRAM;  Surgeon: Robbi Lovell III, MD;  Location: [facility];  Service: General;;    REMOVAL OF VASCULAR ACCESS PORT Right 2/13/2020    Procedure: REMOVAL, VASCULAR ACCESS PORT;  Surgeon: Robbi Lovell III, MD;  Location: [facility];  Service: General;  Laterality: Right;    SHOULDER SURGERY      vulva cancer         Time Tracking:     OT Date of Treatment: 03/13/20  OT Start Time: 1338  OT Stop Time: 1355  OT Total Time (min): 17 min    Billable Minutes:Evaluation 17    Sussy Joseo, OT  3/13/2020

## 2020-03-13 NOTE — PROGRESS NOTES
Formerly [facility]  Adult Nutrition   Progress Note (Nutrition Support Management)    SUMMARY     Recommendations  Recommendation/Intervention:   1. Ostomy output remains high. Added no concentrated sweets to diet. Will hold/limit juices, oral nutrition supplements, and recommend no sugar alcohols.   2. Recommend that GI be consulted. Octreotide was resumed to help decrease ostomy output. RD believes other interventions should be considered: discontinue appetite stimulant, consider making patient NPO until ostomy output is able to be reduced, start a probiotic, discontinue medications with sugar alcohols if medically able. Appreciate input from gastroenterology and hospital medicine.  3. New TPN to be initiated today @ 1700, run at 65 ml/hr.   4. Blood glucose and electrolytes to be monitored and corrected accordingly.  5. RD to monitor PO intake, TPN infusion and tolerance. Monitor I/Os. Will continue to monitor and manage nutrition support daily.     Goals:   1. Patient to meet estimated needs via TPN provision.  2. Ostomy output to be controlled.   3. Blood glucose and electrolyte to trendtowards normal limits.      Nutrition Goal Status: progressing towards goal     Dietitian Rounds Brief  TPN to continue. New TPN ordered. Patient is eating well and requests Ensure Enlive, RD did not add due to high output. Will limit sugary foods and fluids. Ostomy output remains high. This may pose difficulty once patient is discharged to LTAC as I/Os, electrolytes and fluid status are not likely to be monitored as frequently as they are in acute care and TPN formula is not likely to be adjusted daily. Octreotide was restarted. RD recommends that the following should be considered to help mitigate risks: discontinue appetite stimulant, consider making patient NPO until ostomy output is able to be reduced, add fluid restriction, start a probiotic. Appreciate input from gastroenterology.      PARENTERAL NUTRITION  "PROGRESS NOTE:    Parenteral Nutrition Day # 13  Diagnosis/Indication for PN: high ostomy output, severe malnutrition   IV Access: PICC  Diet/Tube Feeding: Regular          Admixture Type: 3-in-1 (15% AA, 70% dextrose, 20% smoflipid)   Infusion Rate and Frequency: 65   Patient Acuity: Acute Care      PN Composition:   96 grams Amino Acid, 301 grams Dextrose, and 60 grams Lipid.    Today's TPN provides 2007 kcal.     Please see order for electrolytes and additives content and adjustments.   *The Nutrition Support Team will continue to monitor electrolytes daily and adjust parenteral nutrition as warranted.    Reason for Assessment  Reason For Assessment: new TPN, RD follow-up  Interdisciplinary Rounds: attended    Nutrition Risk Screen  Nutrition Risk Screen: tube feeding or parenteral nutrition       Ileostomy 02/13/20 1600 Loop RLQ-Wound Image: Images linked       Pressure Injury 02/27/20 1837 Left Buttocks Stage 2-Wound Image: Images linked  MST Score: 0  Have you recently lost weight without trying?: No(not since coming to hospital)  Weight loss score: 0  Have you been eating poorly because of a decreased appetite?: No  Appetite score: 0       Nutrition/Diet History  Spiritual, Cultural Beliefs, [employer] Practices, Values that Affect Care: yes(pt states she is Rastafari, she believes in God)  Food Allergies: NKFA  Factors Affecting Nutritional Intake: altered gastrointestinal function, malabsorption  Nutrition Support Formula Prior to Admit: Other (see comments)(Custom TPN )    Anthropometrics  Temp: 98 °F (36.7 °C)  Height Method: Stated  Height: 5' 7.5" (171.5 cm)  Height (inches): 67.5 in  Weight Method: Bed Scale  Weight: 63.5 kg (139 lb 15.9 oz)  Weight (lb): 139.99 lb  Ideal Body Weight (IBW), Female: 137.5 lb  % Ideal Body Weight, Female (lb): 101.81 %  BMI (Calculated): 21.6  BMI Grade: 18.5-24.9 - normal       Weight History:  Wt Readings from Last 10 Encounters:   03/08/20 63.5 kg (139 lb 15.9 oz) "   03/06/20 53 kg (116 lb 13.5 oz)   03/07/20 52.6 kg (116 lb)   03/03/20 53 kg (116 lb 13.5 oz)   02/19/20 58.7 kg (129 lb 4.8 oz)   02/19/20 58.8 kg (129 lb 9.6 oz)   02/14/20 56.7 kg (125 lb)   02/05/20 56.9 kg (125 lb 6.4 oz)   01/22/20 57.2 kg (126 lb)   01/08/20 61.4 kg (135 lb 6.4 oz)       Lab/Procedures/Meds: Pertinent Labs Reviewed  Clinical Chemistry:  Recent Labs   Lab 03/11/20  0349 03/12/20  0441 03/13/20  0549    139 142   K 3.3* 4.8 4.4    107 108   CO2 27 25 27    343* 110   BUN 31* 31* 34*   CREATININE 1.6* 1.6* 1.7*   CALCIUM 7.9* 7.7* 7.9*   PROT 4.8* 4.6* 4.8*   ALBUMIN 2.4* 2.3* 2.3*   BILITOT 1.0 1.1* 1.1*   ALKPHOS 196* 181* 197*   AST 23 21 19   ALT 13 12 13   ANIONGAP 9 7* 7*   ESTGFRAFRICA 37.6* 37.6* 35.0*   EGFRNONAA 32.6* 32.6* 30.3*   MG 2.0 2.5 2.2   PHOS 2.5* 4.4 3.7     CBC:   Recent Labs   Lab 03/12/20  0441   WBC 6.76   RBC 2.66*   HGB 8.3*   HCT 26.7*   *   *   MCH 31.2*   MCHC 31.1*     Lipid Panel:  Recent Labs   Lab 03/09/20  0243   TRIG 80     Medications: Pertinent Medications reviewed  Scheduled Meds:   cholestyramine-aspartame  1 packet Oral BID    diphenhydrAMINE  6.25 mg Intravenous Q6H    dronabinoL  2.5 mg Oral BID AC    furosemide (LASIX) IV  20 mg Intravenous Once    levothyroxine  88 mcg Oral Before breakfast    loperamide  4 mg Oral QID    magnesium oxide  400 mg Oral BID    pantoprazole  40 mg Intravenous BID    psyllium husk (with sugar)  2 packet Oral TID    sertraline  50 mg Oral Daily     Continuous Infusions:   dextrose 10 % in water (D10W)      lactated ringers 75 mL/hr at 03/12/20 1913    octreotide (SANDOSTATIN) infusion      TPN ADULT CENTRAL LINE CUSTOM (3 in 1) 65 mL/hr at 03/12/20 1700    TPN ADULT CENTRAL LINE CUSTOM (3 in 1)       PRN Meds:.sodium chloride, acetaminophen, calcium chloride IVPB, calcium chloride IVPB, calcium chloride IVPB, calcium chloride IVPB, calcium chloride IVPB, calcium chloride  IVPB, dextrose 10 % in water (D10W), dextrose 50%, dextrose 50%, glucagon (human recombinant), glucose, glucose, HYDROcodone-acetaminophen, magnesium oxide, magnesium oxide, magnesium sulfate IVPB, magnesium sulfate IVPB, magnesium sulfate IVPB, magnesium sulfate IVPB, magnesium sulfate IVPB, magnesium sulfate IVPB, magnesium sulfate IVPB, magnesium sulfate IVPB, melatonin, ondansetron, potassium chloride in water, potassium chloride in water, potassium chloride in water, potassium chloride in water, potassium chloride in water, potassium chloride in water, potassium chloride in water, potassium chloride in water, potassium chloride, potassium chloride, potassium chloride, potassium chloride, potassium chloride, potassium chloride, potassium chloride, potassium chloride, sodium chloride 0.9%, sodium phosphate IVPB, sodium phosphate IVPB, sodium phosphate IVPB, sodium phosphate IVPB, sodium phosphate IVPB, sodium phosphate IVPB, sodium phosphate IVPB, sodium phosphate IVPB, sodium phosphate IVPB, sodium phosphate IVPB    Estimated/Assessed Needs  Weight Used For Calorie Calculations: 63.5 kg (139 lb 15.9 oz)  Energy Calorie Requirements (kcal): 1905 - 2223 (30 - 35)   Energy Need Method: Kcal/kg  Protein Requirements: 95 - 127 (1.5 - 2 g/kg)   Weight Used For Protein Calculations: 63.5 kg (139 lb 15.9 oz)     Estimated Fluid Requirement Method: RDA Method  RDA Method (mL): 1905       Nutrition Prescription Ordered  Current Diet Order: Regular Diet   Current Nutrition Support Formula Ordered: Other (Comment)([place] TPN )  Current Nutrition Support Rate Ordered: 65 (ml)    Evaluation of Received Nutrient/Fluid Intake  Parenteral Calories (kcal): 2007  Parenteral Protein (gm): 96  Parenteral Fluid (mL): 1560  Energy Calories Required: meeting needs  Protein Required: meeting needs  Fluid Required: meeting needs  Tolerance: tolerating     Intake/Output Summary (Last 24 hours) at 3/13/2020 8337  Last data filed at  3/13/2020 0400  Gross per 24 hour   Intake 2520 ml   Output 2800 ml   Net -280 ml      Nutrition Risk  Level of Risk/Frequency of Follow-up: high     Monitor and Evaluation  Food and Nutrient Intake: energy intake, food and beverage intake, parenteral nutrition intake  Food and Nutrient Adminstration: diet order, enteral and parenteral nutrition administration  Physical Activity and Function: nutrition-related ADLs and IADLs, factors affecting access to physical activity  Anthropometric Measurements: weight change, weight, body mass index  Biochemical Data, Medical Tests and Procedures: electrolyte and renal panel, glucose/endocrine profile, lipid profile, gastrointestinal profile, inflammatory profile  Nutrition-Focused Physical Findings: overall appearance     Nutrition Follow-Up  RD Follow-up?: Yes    Sandra Lovell RD 03/13/2020 5:15 PM

## 2020-03-13 NOTE — PROGRESS NOTES
[facility] Medicine  Progress Note    Patient Name: Shara Hutchins  MRN: [id number]  Patient Class: IP- Inpatient   Admission Date: 3/7/2020  Length of Stay: 4 days  Attending Physician: Grant Ravi MD  Primary Care Provider: April Horton MD        Subjective:     Principal Problem:Chronic diarrhea    Interval History:   Patient reports increased shortness of breath today.  Decreased IV fluids.  IV Lasix ordered.  Chest x-ray ordered.  Reports having worsening watery output from ileostomy.  Restarted octreotide drip.  Continues to be on TPN.  Discussing with sw for octreotide IM monthly.  Awaiting for LTAC placement.    Review of Systems   Constitutional: Negative for chills, fatigue, fever and unexpected weight change.   HENT: Negative for sore throat.    Eyes: Negative for visual disturbance.   Respiratory: Negative for cough, chest tightness and shortness of breath.    Cardiovascular: Negative for chest pain.   Gastrointestinal: Negative for abdominal pain, constipation, diarrhea, nausea and vomiting.   Endocrine: Negative for polyuria.   Genitourinary: Negative for dysuria and hematuria.   Neurological: Negative for headaches.     Objective:     Vital Signs (Most Recent):  Temp: 98 °F (36.7 °C) (03/13/20 1100)  Pulse: 83 (03/13/20 1100)  Resp: 18 (03/13/20 1100)  BP: (!) 183/86 (03/13/20 1100)  SpO2: 95 % (03/13/20 1100) Vital Signs (24h Range):  Temp:  [97.7 °F (36.5 °C)-98.5 °F (36.9 °C)] 98 °F (36.7 °C)  Pulse:  [76-84] 83  Resp:  [16-18] 18  SpO2:  [94 %-97 %] 95 %  BP: (154-183)/(64-86) 183/86     Weight: 63.5 kg (139 lb 15.9 oz)  Body mass index is 21.6 kg/m².    Intake/Output Summary (Last 24 hours) at 3/13/2020 1359  Last data filed at 3/13/2020 0400  Gross per 24 hour   Intake 3120 ml   Output 4850 ml   Net -1730 ml      Physical Exam   Constitutional: She is oriented to person, place, and time. No distress.   HENT:   Head: Normocephalic and atraumatic.   Right Ear:  External ear normal.   Left Ear: External ear normal.   Eyes: Conjunctivae and EOM are normal. Right eye exhibits no discharge. Left eye exhibits no discharge.   Neck: Normal range of motion.   Cardiovascular: Normal rate and regular rhythm.   No murmur heard.  Pulmonary/Chest: Effort normal and breath sounds normal. No respiratory distress.   S/p port   Abdominal: Soft. She exhibits no distension. There is no tenderness.   S/p ostomy   Genitourinary:   Genitourinary Comments: lee   Musculoskeletal: Normal range of motion.   Neurological: She is alert and oriented to person, place, and time.   Skin: She is not diaphoretic.   Psychiatric: She has a normal mood and affect. Her behavior is normal. Judgment and thought content normal.       Significant Labs:   CMP:   Recent Labs   Lab 03/12/20  0441 03/13/20  0549    142   K 4.8 4.4    108   CO2 25 27   * 110   BUN 31* 34*   CREATININE 1.6* 1.7*   CALCIUM 7.7* 7.9*   PROT 4.6* 4.8*   ALBUMIN 2.3* 2.3*   BILITOT 1.1* 1.1*   ALKPHOS 181* 197*   AST 21 19   ALT 12 13   ANIONGAP 7* 7*   EGFRNONAA 32.6* 30.3*       Assessment/Plan:      Active Hospital Problems    Diagnosis    *Chronic diarrhea    Malnutrition    Acute renal failure superimposed on stage 5 chronic kidney disease, not on chronic dialysis    Unstageable pressure injury of skin and tissue    DNR (do not resuscitate)    S/P ileostomy    Hypothyroidism    Anemia of chronic disease    Iron deficiency anemia due to chronic blood loss       Malnutrition  Continue TPN until ostomy output decreased  Restarted Octreotide drip due to worsening watery diarrhea  Discussing with  for octreotide IM monthly  On TPN for nutrition support with suspected short gut syndrome  Will need reversal of ostomy with General surgery in several weeks  Awaiting for LTAC placement  PT/OT  Unable to give chemical DVT prophylaxis due to thrombocytopenia    Unstageable pressure injury of skin and tissue  Wound  care ordered      Iron deficiency anemia due to chronic blood loss  Transfuse 1 unit of prbc's      VTE Risk Mitigation (From admission, onward)         Ordered     Place sequential compression device  Until discontinued      03/08/20 0133                      Grant Ravi MD  Department of Hospital Medicine   [facility]  Date of service: 03/13/2020

## 2020-03-13 NOTE — ASSESSMENT & PLAN NOTE
Continue TPN until ostomy output decreased  Restarted Octreotide drip due to worsening watery diarrhea  Discussing with sw for octreotide IM monthly  On TPN for nutrition support with suspected short gut syndrome  Will need reversal of ostomy with General surgery in several weeks  Awaiting for LTAC placement  PT/OT  Unable to give chemical DVT prophylaxis due to thrombocytopenia

## 2020-03-13 NOTE — HOSPITAL COURSE
Patient was admitted for severe dehydration and malnutrition with 20 lb weight loss at home due to high output from the ileostomy.    Patient was started on TPN and IV fluids.  Patient continued to have high output from the ileostomy multiple anti-diarrheal agents./octreotide and multiple measures are instituted and improved ..    Patient was tolerating diet and had improved nutrition.  Octreotide was discontinued .  Surgery MD  Plan for reversal of ileostomy in several weeks or as needed.  .  Hospitalization was complicated with UTI and hematuria and bladder spasms and later needed Urology consultation and continuous bladder irrigation for hematuria but cleared later.  She was discharged in stable condition to long term care facility with U cath,  Urine culture was growing 2 organisms with Pseudomonas and Enterococcus faecalis and she needed IV antibiotic therapy to continued 7 days with meropenem.  She was given appointment  with surgeon, urologist and Cardiology and primary care doctor as outpatient and patient was transferred in stable condition.

## 2020-03-13 NOTE — SUBJECTIVE & OBJECTIVE
Interval History:   Patient reports increased shortness of breath today.  Decreased IV fluids.  IV Lasix ordered.  Chest x-ray ordered.  Reports having worsening watery output from ileostomy.  Restarted octreotide drip.  Continues to be on TPN.  Discussing with sw for octreotide IM monthly.  Awaiting for LTAC placement.    Review of Systems   Constitutional: Negative for chills, fatigue, fever and unexpected weight change.   HENT: Negative for sore throat.    Eyes: Negative for visual disturbance.   Respiratory: Negative for cough, chest tightness and shortness of breath.    Cardiovascular: Negative for chest pain.   Gastrointestinal: Negative for abdominal pain, constipation, diarrhea, nausea and vomiting.   Endocrine: Negative for polyuria.   Genitourinary: Negative for dysuria and hematuria.   Neurological: Negative for headaches.     Objective:     Vital Signs (Most Recent):  Temp: 98 °F (36.7 °C) (03/13/20 1100)  Pulse: 83 (03/13/20 1100)  Resp: 18 (03/13/20 1100)  BP: (!) 183/86 (03/13/20 1100)  SpO2: 95 % (03/13/20 1100) Vital Signs (24h Range):  Temp:  [97.7 °F (36.5 °C)-98.5 °F (36.9 °C)] 98 °F (36.7 °C)  Pulse:  [76-84] 83  Resp:  [16-18] 18  SpO2:  [94 %-97 %] 95 %  BP: (154-183)/(64-86) 183/86     Weight: 63.5 kg (139 lb 15.9 oz)  Body mass index is 21.6 kg/m².    Intake/Output Summary (Last 24 hours) at 3/13/2020 1359  Last data filed at 3/13/2020 0400  Gross per 24 hour   Intake 3120 ml   Output 4850 ml   Net -1730 ml      Physical Exam   Constitutional: She is oriented to person, place, and time. No distress.   HENT:   Head: Normocephalic and atraumatic.   Right Ear: External ear normal.   Left Ear: External ear normal.   Eyes: Conjunctivae and EOM are normal. Right eye exhibits no discharge. Left eye exhibits no discharge.   Neck: Normal range of motion.   Cardiovascular: Normal rate and regular rhythm.   No murmur heard.  Pulmonary/Chest: Effort normal and breath sounds normal. No respiratory  distress.   S/p port   Abdominal: Soft. She exhibits no distension. There is no tenderness.   S/p ostomy   Genitourinary:   Genitourinary Comments: lee   Musculoskeletal: Normal range of motion.   Neurological: She is alert and oriented to person, place, and time.   Skin: She is not diaphoretic.   Psychiatric: She has a normal mood and affect. Her behavior is normal. Judgment and thought content normal.       Significant Labs:   CMP:   Recent Labs   Lab 03/12/20  0441 03/13/20  0549    142   K 4.8 4.4    108   CO2 25 27   * 110   BUN 31* 34*   CREATININE 1.6* 1.7*   CALCIUM 7.7* 7.9*   PROT 4.6* 4.8*   ALBUMIN 2.3* 2.3*   BILITOT 1.1* 1.1*   ALKPHOS 181* 197*   AST 21 19   ALT 12 13   ANIONGAP 7* 7*   EGFRNONAA 32.6* 30.3*

## 2020-03-13 NOTE — PT/OT/SLP EVAL
"Physical Therapy Evaluation    Patient Name:  Shara Hutchins   MRN:  [id number]    Recommendations:     Discharge Recommendations:  LTACH (long-term acute care hospital)   Discharge Equipment Recommendations: walker, rolling       Assessment:     Shara Hutchins is a 69 y.o. female admitted with a medical diagnosis of Chronic diarrhea.  She presents with the following impairments/functional limitations:  weakness, impaired endurance, impaired self care skills, impaired functional mobilty, gait instability .  Pt  Was living at home with her cousin and was ambulatory w/o an AD. Pt was driving 1 month ago. Pt has not "been on my feet for over a week." Plan is for D/C to LTAC.    Rehab Prognosis: Good; patient would benefit from acute skilled PT services to address these deficits and reach maximum level of function.    Recent Surgery: * No surgery found *      Plan:     During this hospitalization, patient to be seen daily to address the identified rehab impairments via gait training, therapeutic activities, therapeutic exercises and progress toward the following goals:    · Plan of Care Expires:  04/13/20    Subjective     Chief Complaint: weakness  Patient/Family Comments/goals:LTAC  Pain/Comfort:  ·      Patients cultural, spiritual, Buddhist conflicts given the current situation:      Living Environment:    Prior to admission, patients level of function was independent   Equipment used at home: glucometer.  DME owned (not currently used): none.  Upon discharge, patient will have assistance from facility.    Objective:     Communicated with nurse prior to session.  Patient found supine with bed alarm, lee catheter(ileostomy)  upon PT entry to room.    General Precautions: Standard, fall   Orthopedic Precautions:    Braces:       Exams:  · Cognitive Exam:  Patient is oriented to Person, Place, Time and Situation  · RLE ROM: WFL  · RLE Strength: WFL  · LLE ROM: WFL  · LLE Strength: WFL    Functional Mobility:  · Bed " Mobility:     · Supine to Sit: supervision  · Transfers:     · Sit to Stand:  contact guard assistance with no AD  · Gait: 20 ft RW and CGA      Therapeutic Activities and Exercises:  T/f and gait training    AM-PAC 6 CLICK MOBILITY  Total Score:17     Patient left up in chair with call button in reach and nurse notified.    GOALS:   Multidisciplinary Problems     Physical Therapy Goals        Problem: Physical Therapy Goal    Goal Priority Disciplines Outcome Goal Variances Interventions   Physical Therapy Goal     PT, PT/OT      Description:  Goals to be met by: D/C    Patient will increase functional independence with mobility by performin. Supine to sit with Modified [place]  2. Sit to stand transfer with Stand-by Assistance  3. Gait  x 100 feet with Stand-by Assistance using Rolling Walker.                       History:     Past Medical History:   Diagnosis Date    Chronic kidney disease, stage III (moderate) 2018    Diabetes mellitus, type 2     Fatigue     Hypertension     Iron deficiency anemia due to chronic blood loss 2018    Vulvar cancer 2019       Past Surgical History:   Procedure Laterality Date    ABDOMINAL SURGERY      BACK SURGERY      carpel tunnel surgery once on each hand      CERVICAL FUSION      x 4    cervix repair      CHOLECYSTECTOMY      DIAGNOSTIC LAPAROSCOPY N/A 2020    Procedure: LAPAROSCOPY, DIAGNOSTIC;  Surgeon: Robbi Lovell III, MD;  Location: [facility];  Service: General;  Laterality: N/A;    HYSTERECTOMY  1980    ILEOSTOMY N/A 2020    Procedure: CREATION, ILEOSTOMY Loop;  Surgeon: Robbi Lovell III, MD;  Location: [facility] OR;  Service: General;  Laterality: N/A;    LUMBAR LAMINECTOMY      LYSIS OF ADHESIONS N/A 2020    Procedure: LYSIS, ADHESIONS;  Surgeon: Robbi Lovell III, MD;  Location: [facility] OR;  Service: General;  Laterality: N/A;    NECK SURGERY      PHLEBOGRAPHY  2020    Procedure: VENOGRAM;   Surgeon: Robbi Lovell III, MD;  Location: [facility];  Service: General;;    REMOVAL OF VASCULAR ACCESS PORT Right 2/13/2020    Procedure: REMOVAL, VASCULAR ACCESS PORT;  Surgeon: Robbi Lovell III, MD;  Location: [facility];  Service: General;  Laterality: Right;    SHOULDER SURGERY      vulva cancer         Time Tracking:     PT Received On: 03/13/20  PT Start Time: 1022     PT Stop Time: 1043  PT Total Time (min): 21 min     Billable Minutes: Evaluation 13 minutes and Gait Training 8 minutes      Tamica Dubon, CLEMENCIA  03/13/2020

## 2020-03-14 LAB
ALBUMIN SERPL BCP-MCNC: 2.1 G/DL (ref 3.5–5.2)
ALP SERPL-CCNC: 197 U/L (ref 55–135)
ALT SERPL W/O P-5'-P-CCNC: 12 U/L (ref 10–44)
ANION GAP SERPL CALC-SCNC: 5 MMOL/L (ref 8–16)
AST SERPL-CCNC: 19 U/L (ref 10–40)
BILIRUB SERPL-MCNC: 1.1 MG/DL (ref 0.1–1)
BUN SERPL-MCNC: 37 MG/DL (ref 8–23)
CALCIUM SERPL-MCNC: 8 MG/DL (ref 8.7–10.5)
CHLORIDE SERPL-SCNC: 109 MMOL/L (ref 95–110)
CO2 SERPL-SCNC: 25 MMOL/L (ref 23–29)
CREAT SERPL-MCNC: 1.7 MG/DL (ref 0.5–1.4)
ERYTHROCYTE [DISTWIDTH] IN BLOOD BY AUTOMATED COUNT: 16.8 % (ref 11.5–14.5)
EST. GFR  (AFRICAN AMERICAN): 35 ML/MIN/1.73 M^2
EST. GFR  (NON AFRICAN AMERICAN): 30.3 ML/MIN/1.73 M^2
GLUCOSE SERPL-MCNC: 110 MG/DL (ref 70–110)
GLUCOSE SERPL-MCNC: 118 MG/DL (ref 70–110)
GLUCOSE SERPL-MCNC: 120 MG/DL (ref 70–110)
GLUCOSE SERPL-MCNC: 127 MG/DL (ref 70–110)
GLUCOSE SERPL-MCNC: 166 MG/DL (ref 70–110)
HCT VFR BLD AUTO: 25.4 % (ref 37–48.5)
HGB BLD-MCNC: 7.8 G/DL (ref 12–16)
MAGNESIUM SERPL-MCNC: 2.2 MG/DL (ref 1.6–2.6)
MCH RBC QN AUTO: 30.7 PG (ref 27–31)
MCHC RBC AUTO-ENTMCNC: 30.7 G/DL (ref 32–36)
MCV RBC AUTO: 100 FL (ref 82–98)
PHOSPHATE SERPL-MCNC: 4.4 MG/DL (ref 2.7–4.5)
PLATELET # BLD AUTO: 139 K/UL (ref 150–350)
PMV BLD AUTO: 11.9 FL (ref 9.2–12.9)
POTASSIUM SERPL-SCNC: 3.9 MMOL/L (ref 3.5–5.1)
PROT SERPL-MCNC: 4.7 G/DL (ref 6–8.4)
RBC # BLD AUTO: 2.54 M/UL (ref 4–5.4)
SODIUM SERPL-SCNC: 139 MMOL/L (ref 136–145)
T4 FREE SERPL-MCNC: 0.84 NG/DL (ref 0.71–1.51)
TSH SERPL DL<=0.005 MIU/L-ACNC: 3.77 UIU/ML (ref 0.34–5.6)
WBC # BLD AUTO: 8.04 K/UL (ref 3.9–12.7)

## 2020-03-14 PROCEDURE — 84443 ASSAY THYROID STIM HORMONE: CPT

## 2020-03-14 PROCEDURE — 63600175 PHARM REV CODE 636 W HCPCS: Performed by: INTERNAL MEDICINE

## 2020-03-14 PROCEDURE — 25000003 PHARM REV CODE 250: Performed by: STUDENT IN AN ORGANIZED HEALTH CARE EDUCATION/TRAINING PROGRAM

## 2020-03-14 PROCEDURE — C9113 INJ PANTOPRAZOLE SODIUM, VIA: HCPCS | Performed by: INTERNAL MEDICINE

## 2020-03-14 PROCEDURE — 99223 PR INITIAL HOSPITAL CARE,LEVL III: ICD-10-PCS | Mod: ,,, | Performed by: UROLOGY

## 2020-03-14 PROCEDURE — 25000003 PHARM REV CODE 250: Performed by: INTERNAL MEDICINE

## 2020-03-14 PROCEDURE — 85027 COMPLETE CBC AUTOMATED: CPT

## 2020-03-14 PROCEDURE — 12000002 HC ACUTE/MED SURGE SEMI-PRIVATE ROOM

## 2020-03-14 PROCEDURE — 83735 ASSAY OF MAGNESIUM: CPT

## 2020-03-14 PROCEDURE — 84439 ASSAY OF FREE THYROXINE: CPT

## 2020-03-14 PROCEDURE — A4217 STERILE WATER/SALINE, 500 ML: HCPCS | Performed by: FAMILY MEDICINE

## 2020-03-14 PROCEDURE — 63600175 PHARM REV CODE 636 W HCPCS: Performed by: FAMILY MEDICINE

## 2020-03-14 PROCEDURE — 80053 COMPREHEN METABOLIC PANEL: CPT

## 2020-03-14 PROCEDURE — 25000003 PHARM REV CODE 250: Performed by: FAMILY MEDICINE

## 2020-03-14 PROCEDURE — 84100 ASSAY OF PHOSPHORUS: CPT

## 2020-03-14 PROCEDURE — B4185 PARENTERAL SOL 10 GM LIPIDS: HCPCS | Performed by: FAMILY MEDICINE

## 2020-03-14 PROCEDURE — 99223 1ST HOSP IP/OBS HIGH 75: CPT | Mod: ,,, | Performed by: UROLOGY

## 2020-03-14 RX ORDER — BUTALBITAL, ACETAMINOPHEN AND CAFFEINE 50; 325; 40 MG/1; MG/1; MG/1
1 TABLET ORAL EVERY 6 HOURS PRN
Status: DISCONTINUED | OUTPATIENT
Start: 2020-03-14 | End: 2020-03-27 | Stop reason: HOSPADM

## 2020-03-14 RX ORDER — HYOSCYAMINE SULFATE 0.12 MG/1
0.12 TABLET SUBLINGUAL EVERY 4 HOURS PRN
Status: DISCONTINUED | OUTPATIENT
Start: 2020-03-14 | End: 2020-03-27 | Stop reason: HOSPADM

## 2020-03-14 RX ADMIN — BUTALBITAL, ACETAMINOPHEN AND CAFFEINE 1 TABLET: 50; 325; 40 TABLET ORAL at 10:03

## 2020-03-14 RX ADMIN — LEVOTHYROXINE SODIUM 88 MCG: 88 TABLET ORAL at 05:03

## 2020-03-14 RX ADMIN — LOPERAMIDE HYDROCHLORIDE 4 MG: 2 CAPSULE ORAL at 09:03

## 2020-03-14 RX ADMIN — HYOSCYAMINE SULFATE 0.12 MG: 0.12 TABLET ORAL at 10:03

## 2020-03-14 RX ADMIN — PANTOPRAZOLE SODIUM 40 MG: 40 INJECTION, POWDER, LYOPHILIZED, FOR SOLUTION INTRAVENOUS at 09:03

## 2020-03-14 RX ADMIN — HYDROCODONE BITARTRATE AND ACETAMINOPHEN 1 TABLET: 5; 325 TABLET ORAL at 05:03

## 2020-03-14 RX ADMIN — LOPERAMIDE HYDROCHLORIDE 4 MG: 2 CAPSULE ORAL at 01:03

## 2020-03-14 RX ADMIN — SALINE NASAL SPRAY 1 SPRAY: 1.5 SOLUTION NASAL at 12:03

## 2020-03-14 RX ADMIN — LOPERAMIDE HYDROCHLORIDE 4 MG: 2 CAPSULE ORAL at 10:03

## 2020-03-14 RX ADMIN — SERTRALINE HYDROCHLORIDE 50 MG: 50 TABLET ORAL at 09:03

## 2020-03-14 RX ADMIN — DIPHENOXYLATE HYDROCHLORIDE AND ATROPINE SULFATE 1 TABLET: 2.5; .025 TABLET ORAL at 09:03

## 2020-03-14 RX ADMIN — SMOFLIPID: 6; 6; 5; 3 INJECTION, EMULSION INTRAVENOUS at 04:03

## 2020-03-14 RX ADMIN — DIPHENOXYLATE HYDROCHLORIDE AND ATROPINE SULFATE 1 TABLET: 2.5; .025 TABLET ORAL at 10:03

## 2020-03-14 RX ADMIN — DIPHENHYDRAMINE HYDROCHLORIDE 6.25 MG: 50 INJECTION INTRAMUSCULAR; INTRAVENOUS at 05:03

## 2020-03-14 RX ADMIN — DRONABINOL 2.5 MG: 2.5 CAPSULE ORAL at 05:03

## 2020-03-14 RX ADMIN — DIPHENHYDRAMINE HYDROCHLORIDE 6.25 MG: 50 INJECTION INTRAMUSCULAR; INTRAVENOUS at 12:03

## 2020-03-14 RX ADMIN — DIPHENOXYLATE HYDROCHLORIDE AND ATROPINE SULFATE 1 TABLET: 2.5; .025 TABLET ORAL at 01:03

## 2020-03-14 RX ADMIN — LOPERAMIDE HYDROCHLORIDE 4 MG: 2 CAPSULE ORAL at 05:03

## 2020-03-14 RX ADMIN — PSYLLIUM HUSK 2 PACKET: 3.4 POWDER ORAL at 09:03

## 2020-03-14 RX ADMIN — OCTREOTIDE ACETATE 25 MCG/HR: 500 INJECTION, SOLUTION INTRAVENOUS; SUBCUTANEOUS at 11:03

## 2020-03-14 RX ADMIN — DIPHENOXYLATE HYDROCHLORIDE AND ATROPINE SULFATE 1 TABLET: 2.5; .025 TABLET ORAL at 05:03

## 2020-03-14 RX ADMIN — MAGNESIUM OXIDE 400 MG: 400 TABLET ORAL at 10:03

## 2020-03-14 RX ADMIN — MAGNESIUM OXIDE 400 MG: 400 TABLET ORAL at 09:03

## 2020-03-14 RX ADMIN — PANTOPRAZOLE SODIUM 40 MG: 40 INJECTION, POWDER, LYOPHILIZED, FOR SOLUTION INTRAVENOUS at 10:03

## 2020-03-14 NOTE — PROGRESS NOTES
[facility]  Adult Nutrition   Progress Note (Nutrition Support Management)    SUMMARY     Recommendations  Recommendation/Intervention:   1. New TPN to be ordered and initiated @ 1700, run @ 65 ml/hr.   2. NST to follow and monitor labs and manage TPN accordingly   3. Continue current diet as tolerated by pt     Goals:   1. Pt to meet estimated needs via TPN/PO diet   2. Ostomy output be controlled   3. Blood glucose and electrolytes to trend towards normal limits  Nutrition Goal Status: progressing towards goal  Communication of RD Recs: reviewed with RN      PARENTERAL NUTRITION PROGRESS NOTE:      Parenteral Nutrition Day # 14  Diagnosis/Indication for PN: high ostomy output, severe malnutrition   IV Access: PICC  Diet/Tube Feeding: bland         Admixture Type: custom 3-in-1 TPN (15% AA, 70% dextrose, 20% smoflipid)  Infusion Rate and Frequency: 65 ml/hr   Patient Acuity: Acute care    PN Composition:   96 grams Amino Acid, 301 grams Dextrose, and 60 grams Lipid.    Today's TPN provides 2007 kcal.  *Dextrose is started at less than full dextrose goal to reduce risk for Refeeding Syndrome. Dextrose content will be advanced as appropriate over the next few days.    Please see order for electrolytes and additives content and adjustments.   *The Nutrition Support Team will continue to monitor electrolytes daily and adjust parenteral nutrition as warranted.    Reason for Assessment  Reason For Assessment: new TPN, RD follow-up    Nutrition Risk Screen  Nutrition Risk Screen: tube feeding or parenteral nutrition       Ileostomy 02/13/20 1600 Loop RLQ-Wound Image: Images linked       Pressure Injury 02/27/20 1837 Left Buttocks Stage 2-Wound Image: Images linked  MST Score: 0  Have you recently lost weight without trying?: No(not since coming to hospital)  Weight loss score: 0  Have you been eating poorly because of a decreased appetite?: No  Appetite score: 0       Nutrition/Diet History  Spiritual,  "Cultural Beliefs, Sabianist Practices, Values that Affect Care: yes(pt states she is Amish, she believes in God)  Food Allergies: NKFA  Factors Affecting Nutritional Intake: malabsorption, altered gastrointestinal function  Nutrition Support Formula Prior to Admit: Other (see comments)(Custom TPN )    Anthropometrics  Temp: 98.7 °F (37.1 °C)  Height Method: Stated  Height: 5' 7.5" (171.5 cm)  Height (inches): 67.5 in  Weight Method: Bed Scale  Weight: 63.5 kg (139 lb 15.9 oz)  Weight (lb): 139.99 lb  Ideal Body Weight (IBW), Female: 137.5 lb  % Ideal Body Weight, Female (lb): 101.81 %  BMI (Calculated): 21.6  BMI Grade: 18.5-24.9 - normal       Weight History:  Wt Readings from Last 10 Encounters:   03/08/20 63.5 kg (139 lb 15.9 oz)   03/06/20 53 kg (116 lb 13.5 oz)   03/07/20 52.6 kg (116 lb)   03/03/20 53 kg (116 lb 13.5 oz)   02/19/20 58.7 kg (129 lb 4.8 oz)   02/19/20 58.8 kg (129 lb 9.6 oz)   02/14/20 56.7 kg (125 lb)   02/05/20 56.9 kg (125 lb 6.4 oz)   01/22/20 57.2 kg (126 lb)   01/08/20 61.4 kg (135 lb 6.4 oz)       Lab/Procedures/Meds: Pertinent Labs Reviewed  Clinical Chemistry:  Recent Labs   Lab 03/12/20  0441 03/13/20  0549 03/14/20  0504    142 139   K 4.8 4.4 3.9    108 109   CO2 25 27 25   * 110 110   BUN 31* 34* 37*   CREATININE 1.6* 1.7* 1.7*   CALCIUM 7.7* 7.9* 8.0*   PROT 4.6* 4.8* 4.7*   ALBUMIN 2.3* 2.3* 2.1*   BILITOT 1.1* 1.1* 1.1*   ALKPHOS 181* 197* 197*   AST 21 19 19   ALT 12 13 12   ANIONGAP 7* 7* 5*   ESTGFRAFRICA 37.6* 35.0* 35.0*   EGFRNONAA 32.6* 30.3* 30.3*   MG 2.5 2.2 2.2   PHOS 4.4 3.7 4.4     CBC:   Recent Labs   Lab 03/14/20  0504   WBC 8.04   RBC 2.54*   HGB 7.8*   HCT 25.4*   *   *   MCH 30.7   MCHC 30.7*     Lipid Panel:  Recent Labs   Lab 03/09/20  0243   TRIG 80     Thyroid & Parathyroid:  Recent Labs   Lab 03/14/20  0504   TSH 3.770   FREET4 0.84       Medications: Pertinent Medications reviewed  Scheduled Meds:   diphenhydrAMINE  " 6.25 mg Intravenous Q6H    diphenoxylate-atropine 2.5-0.025 mg  1 tablet Oral QID    dronabinoL  2.5 mg Oral BID AC    furosemide (LASIX) IV  20 mg Intravenous Once    levothyroxine  88 mcg Oral Before breakfast    loperamide  4 mg Oral QID    magnesium oxide  400 mg Oral BID    pantoprazole  40 mg Intravenous BID    psyllium husk (with sugar)  2 packet Oral TID    sertraline  50 mg Oral Daily     Continuous Infusions:   dextrose 10 % in water (D10W)      lactated ringers 50 mL/hr at 03/13/20 1353    octreotide (SANDOSTATIN) infusion 25 mcg/hr (03/13/20 1500)    TPN ADULT CENTRAL LINE CUSTOM (3 in 1) 65 mL/hr at 03/13/20 1700    TPN ADULT CENTRAL LINE CUSTOM (3 in 1)       PRN Meds:.sodium chloride, acetaminophen, calcium chloride IVPB, calcium chloride IVPB, calcium chloride IVPB, calcium chloride IVPB, calcium chloride IVPB, calcium chloride IVPB, dextrose 10 % in water (D10W), dextrose 50%, dextrose 50%, glucagon (human recombinant), glucose, glucose, HYDROcodone-acetaminophen, magnesium oxide, magnesium oxide, magnesium sulfate IVPB, magnesium sulfate IVPB, magnesium sulfate IVPB, magnesium sulfate IVPB, magnesium sulfate IVPB, magnesium sulfate IVPB, magnesium sulfate IVPB, magnesium sulfate IVPB, melatonin, ondansetron, potassium chloride in water, potassium chloride in water, potassium chloride in water, potassium chloride in water, potassium chloride in water, potassium chloride in water, potassium chloride in water, potassium chloride in water, potassium chloride, potassium chloride, potassium chloride, potassium chloride, potassium chloride, potassium chloride, potassium chloride, potassium chloride, sodium chloride 0.9%, sodium phosphate IVPB, sodium phosphate IVPB, sodium phosphate IVPB, sodium phosphate IVPB, sodium phosphate IVPB, sodium phosphate IVPB, sodium phosphate IVPB, sodium phosphate IVPB, sodium phosphate IVPB, sodium phosphate IVPB    Estimated/Assessed Needs  Weight Used For  Calorie Calculations: 63.5 kg (139 lb 15.9 oz)  Energy Calorie Requirements (kcal): 1905 - 2223 (30 - 35)   Energy Need Method: Kcal/kg  Protein Requirements: 95 - 127 (1.5 - 2 g/kg)   Weight Used For Protein Calculations: 63.5 kg (139 lb 15.9 oz)     Estimated Fluid Requirement Method: RDA Method  RDA Method (mL): 1905       Nutrition Prescription Ordered  Current Diet Order: bland  Nutrition Order Comments: no conc sweets, juices  Current Nutrition Support Formula Ordered: Other (Comment)(custom 3-in-1 TPN)  Current Nutrition Support Rate Ordered: 65 (ml)    Evaluation of Received Nutrient/Fluid Intake  Parenteral Calories (kcal): 2007  Parenteral Protein (gm): 96  Parenteral Fluid (mL): 1560  IV Fluid (mL): 1200(LR @ 50 ml/hr)  Energy Calories Required: meeting needs  Protein Required: meeting needs  Fluid Required: meeting needs  Tolerance: tolerating     Intake/Output Summary (Last 24 hours) at 3/14/2020 0944  Last data filed at 3/14/2020 0800  Gross per 24 hour   Intake 5340 ml   Output 8550 ml   Net -3210 ml        % Meal Intake: 50 - 75 %    Nutrition Risk  Level of Risk/Frequency of Follow-up: high     Monitor and Evaluation  Food and Nutrient Intake: food and beverage intake, energy intake, parenteral nutrition intake  Food and Nutrient Adminstration: enteral and parenteral nutrition administration, diet order  Physical Activity and Function: nutrition-related ADLs and IADLs  Anthropometric Measurements: weight change, weight  Biochemical Data, Medical Tests and Procedures: electrolyte and renal panel, gastrointestinal profile, glucose/endocrine profile  Nutrition-Focused Physical Findings: overall appearance, extremities, muscles and bones     Nutrition Follow-Up  RD Follow-up?: Yes    Altagracia Mcdonough RD 03/14/2020 9:44 AM

## 2020-03-14 NOTE — PT/OT/SLP PROGRESS
Physical Therapy      Patient Name:  Shara Hutchins   MRN:  [id number]    Patient not seen today secondary to Other (Comment), Pain(HA; nurse present). Will follow-up 3/15/2020.    Dinora Ridley PT

## 2020-03-14 NOTE — NURSING
Sat up in chair watching TV, IV site remains intact and TPN and IVF's remain infusing via pump. No redness to site. Ileostomy intact and patent to low intermittent suction with liquid stool noted,  Changed canister twice with 1250 cc output, lee intact with blood tinge urine noted. Lasix 20 mg given and out put was 1,000 cc. Patient c/o discomfort notified Dr Ravi and irrigation ordered and order noted. NAD

## 2020-03-14 NOTE — PROGRESS NOTES
[facility] Medicine  Progress Note    Patient Name: Shara Hutchins  MRN: [id number]  Patient Class: IP- Inpatient   Admission Date: 3/7/2020  Length of Stay: 5 days  Attending Physician: Grant Ravi MD  Primary Care Provider: April Horton MD        Subjective:     Principal Problem:Chronic diarrhea    Interval History:  Patient had a clotted Godwin catheter yesterday and currently is blood-tinged urine in the Godwin bag.  Patient has a history of vulvar cancer was suspicion of recurrence.  Discontinue Godwin and external catheter.  Complains of headache.  Otherwise reports feeling well.  Denies any chest pain, shortness of breath.  Currently breathing comfortably on room air.  X-ray showed mild effusion or edema.  Procalcitonin negative.  Adjusted anti diarrheal medications yesterday.  Appears to have more formed stool today.  Working with  for octreotide IM monthly.     Review of Systems   Constitutional: Negative for chills, fatigue, fever and unexpected weight change.   HENT: Negative for sore throat.    Eyes: Negative for visual disturbance.   Respiratory: Negative for cough, chest tightness and shortness of breath.    Cardiovascular: Negative for chest pain.   Gastrointestinal: Negative for abdominal pain, constipation, diarrhea, nausea and vomiting.   Endocrine: Negative for polyuria.   Genitourinary: Negative for dysuria and hematuria.   Neurological: Negative for headaches.     Objective:     Vital Signs (Most Recent):  Temp: 98.7 °F (37.1 °C) (03/14/20 0813)  Pulse: 77 (03/14/20 0813)  Resp: 18 (03/14/20 0813)  BP: 119/88 (03/14/20 0813)  SpO2: 95 % (03/14/20 0813) Vital Signs (24h Range):  Temp:  [98 °F (36.7 °C)-98.7 °F (37.1 °C)] 98.7 °F (37.1 °C)  Pulse:  [69-83] 77  Resp:  [18] 18  SpO2:  [95 %-97 %] 95 %  BP: (119-183)/(60-88) 119/88     Weight: 63.5 kg (139 lb 15.9 oz)  Body mass index is 21.6 kg/m².    Intake/Output Summary (Last 24 hours) at 3/14/2020 1050  Last data  filed at 3/14/2020 0800  Gross per 24 hour   Intake 5340 ml   Output 8550 ml   Net -3210 ml      Physical Exam   Constitutional: She is oriented to person, place, and time. No distress.   HENT:   Head: Normocephalic and atraumatic.   Right Ear: External ear normal.   Left Ear: External ear normal.   Eyes: Conjunctivae and EOM are normal. Right eye exhibits no discharge. Left eye exhibits no discharge.   Neck: Normal range of motion.   Cardiovascular: Normal rate and regular rhythm.   No murmur heard.  Pulmonary/Chest: Effort normal and breath sounds normal. No respiratory distress.   S/p port   Abdominal: Soft. She exhibits no distension. There is no tenderness.   S/p ostomy   Genitourinary:   Genitourinary Comments: lee   Musculoskeletal: Normal range of motion.   Neurological: She is alert and oriented to person, place, and time.   Skin: She is not diaphoretic.   Psychiatric: She has a normal mood and affect. Her behavior is normal. Judgment and thought content normal.       Significant Labs:   CBC:   Recent Labs   Lab 03/14/20  0504   WBC 8.04   HGB 7.8*   HCT 25.4*   *     CMP:   Recent Labs   Lab 03/13/20  0549 03/14/20  0504    139   K 4.4 3.9    109   CO2 27 25    110   BUN 34* 37*   CREATININE 1.7* 1.7*   CALCIUM 7.9* 8.0*   PROT 4.8* 4.7*   ALBUMIN 2.3* 2.1*   BILITOT 1.1* 1.1*   ALKPHOS 197* 197*   AST 19 19   ALT 13 12   ANIONGAP 7* 5*   EGFRNONAA 30.3* 30.3*       Assessment/Plan:      Active Hospital Problems    Diagnosis    *Chronic diarrhea    Malnutrition    Acute renal failure superimposed on stage 5 chronic kidney disease, not on chronic dialysis    Thrombocytopenia, unspecified    Debility    Unstageable pressure injury of skin and tissue    DNR (do not resuscitate)    S/P ileostomy    Hypothyroidism    Anemia of chronic disease    Hisotry of Vulvar cancer    Iron deficiency anemia due to chronic blood loss    Inflammatory bowel disease        Malnutrition  Continue TPN until ostomy output decreased  Restarted Octreotide drip due to worsening watery diarrhea  Discussing with sw for octreotide IM monthly  On TPN for nutrition support with suspected short gut syndrome  Will need reversal of ostomy with General surgery in several weeks  Awaiting for LTAC placement  PT/OT  Unable to give chemical DVT prophylaxis due to thrombocytopenia    Unstageable pressure injury of skin and tissue  Wound care ordered      Iron deficiency anemia due to chronic blood loss  Transfuse 1 unit of prbc's      VTE Risk Mitigation (From admission, onward)         Ordered     Place sequential compression device  Until discontinued      03/08/20 0133                      Grant Ravi MD  Department of Hospital Medicine   [facility]  Date of service: 03/14/2020

## 2020-03-14 NOTE — SUBJECTIVE & OBJECTIVE
Interval History:  Patient had a clotted Godwin catheter yesterday and currently is blood-tinged urine in the Godwin bag.  Patient has a history of vulvar cancer was suspicion of recurrence.  Discontinue Godwin and external catheter.  Complains of headache.  Otherwise reports feeling well.  Denies any chest pain, shortness of breath.  Currently breathing comfortably on room air.  X-ray showed mild effusion or edema.  Procalcitonin negative.  Adjusted anti diarrheal medications yesterday.  Appears to have more formed stool today.  Working with  for octreotide IM monthly.     Review of Systems   Constitutional: Negative for chills, fatigue, fever and unexpected weight change.   HENT: Negative for sore throat.    Eyes: Negative for visual disturbance.   Respiratory: Negative for cough, chest tightness and shortness of breath.    Cardiovascular: Negative for chest pain.   Gastrointestinal: Negative for abdominal pain, constipation, diarrhea, nausea and vomiting.   Endocrine: Negative for polyuria.   Genitourinary: Negative for dysuria and hematuria.   Neurological: Negative for headaches.     Objective:     Vital Signs (Most Recent):  Temp: 98.7 °F (37.1 °C) (03/14/20 0813)  Pulse: 77 (03/14/20 0813)  Resp: 18 (03/14/20 0813)  BP: 119/88 (03/14/20 0813)  SpO2: 95 % (03/14/20 0813) Vital Signs (24h Range):  Temp:  [98 °F (36.7 °C)-98.7 °F (37.1 °C)] 98.7 °F (37.1 °C)  Pulse:  [69-83] 77  Resp:  [18] 18  SpO2:  [95 %-97 %] 95 %  BP: (119-183)/(60-88) 119/88     Weight: 63.5 kg (139 lb 15.9 oz)  Body mass index is 21.6 kg/m².    Intake/Output Summary (Last 24 hours) at 3/14/2020 1050  Last data filed at 3/14/2020 0800  Gross per 24 hour   Intake 5340 ml   Output 8550 ml   Net -3210 ml      Physical Exam   Constitutional: She is oriented to person, place, and time. No distress.   HENT:   Head: Normocephalic and atraumatic.   Right Ear: External ear normal.   Left Ear: External ear normal.   Eyes: Conjunctivae and EOM are  normal. Right eye exhibits no discharge. Left eye exhibits no discharge.   Neck: Normal range of motion.   Cardiovascular: Normal rate and regular rhythm.   No murmur heard.  Pulmonary/Chest: Effort normal and breath sounds normal. No respiratory distress.   S/p port   Abdominal: Soft. She exhibits no distension. There is no tenderness.   S/p ostomy   Genitourinary:   Genitourinary Comments: lee   Musculoskeletal: Normal range of motion.   Neurological: She is alert and oriented to person, place, and time.   Skin: She is not diaphoretic.   Psychiatric: She has a normal mood and affect. Her behavior is normal. Judgment and thought content normal.       Significant Labs:   CBC:   Recent Labs   Lab 03/14/20  0504   WBC 8.04   HGB 7.8*   HCT 25.4*   *     CMP:   Recent Labs   Lab 03/13/20  0549 03/14/20  0504    139   K 4.4 3.9    109   CO2 27 25    110   BUN 34* 37*   CREATININE 1.7* 1.7*   CALCIUM 7.9* 8.0*   PROT 4.8* 4.7*   ALBUMIN 2.3* 2.1*   BILITOT 1.1* 1.1*   ALKPHOS 197* 197*   AST 19 19   ALT 13 12   ANIONGAP 7* 5*   EGFRNONAA 30.3* 30.3*

## 2020-03-15 ENCOUNTER — PATIENT MESSAGE (OUTPATIENT)
Dept: FAMILY MEDICINE | Facility: CLINIC | Age: 70
End: 2020-03-15

## 2020-03-15 LAB
ABO + RH BLD: NORMAL
ALBUMIN SERPL BCP-MCNC: 2.2 G/DL (ref 3.5–5.2)
ALP SERPL-CCNC: 208 U/L (ref 55–135)
ALT SERPL W/O P-5'-P-CCNC: 12 U/L (ref 10–44)
ANION GAP SERPL CALC-SCNC: 8 MMOL/L (ref 8–16)
AST SERPL-CCNC: 20 U/L (ref 10–40)
BILIRUB SERPL-MCNC: 1.1 MG/DL (ref 0.1–1)
BLD GP AB SCN CELLS X3 SERPL QL: NORMAL
BLD PROD TYP BPU: NORMAL
BLOOD UNIT EXPIRATION DATE: NORMAL
BLOOD UNIT TYPE CODE: 6200
BLOOD UNIT TYPE: NORMAL
BUN SERPL-MCNC: 41 MG/DL (ref 8–23)
CALCIUM SERPL-MCNC: 7.8 MG/DL (ref 8.7–10.5)
CHLORIDE SERPL-SCNC: 104 MMOL/L (ref 95–110)
CO2 SERPL-SCNC: 25 MMOL/L (ref 23–29)
CODING SYSTEM: NORMAL
CREAT SERPL-MCNC: 2 MG/DL (ref 0.5–1.4)
DISPENSE STATUS: NORMAL
ERYTHROCYTE [DISTWIDTH] IN BLOOD BY AUTOMATED COUNT: 17 % (ref 11.5–14.5)
EST. GFR  (AFRICAN AMERICAN): 28.7 ML/MIN/1.73 M^2
EST. GFR  (NON AFRICAN AMERICAN): 24.9 ML/MIN/1.73 M^2
GLUCOSE SERPL-MCNC: 111 MG/DL (ref 70–110)
GLUCOSE SERPL-MCNC: 121 MG/DL (ref 70–110)
GLUCOSE SERPL-MCNC: 133 MG/DL (ref 70–110)
GLUCOSE SERPL-MCNC: 138 MG/DL (ref 70–110)
HCT VFR BLD AUTO: 22.1 % (ref 37–48.5)
HCT VFR BLD AUTO: 22.8 % (ref 37–48.5)
HGB BLD-MCNC: 6.9 G/DL (ref 12–16)
HGB BLD-MCNC: 7.3 G/DL (ref 12–16)
MAGNESIUM SERPL-MCNC: 2.3 MG/DL (ref 1.6–2.6)
MCH RBC QN AUTO: 31.6 PG (ref 27–31)
MCHC RBC AUTO-ENTMCNC: 32 G/DL (ref 32–36)
MCV RBC AUTO: 99 FL (ref 82–98)
NUM UNITS TRANS PACKED RBC: NORMAL
PHOSPHATE SERPL-MCNC: 4.5 MG/DL (ref 2.7–4.5)
PLATELET # BLD AUTO: 163 K/UL (ref 150–350)
PMV BLD AUTO: 12.1 FL (ref 9.2–12.9)
POTASSIUM SERPL-SCNC: 4 MMOL/L (ref 3.5–5.1)
PROT SERPL-MCNC: 4.9 G/DL (ref 6–8.4)
RBC # BLD AUTO: 2.31 M/UL (ref 4–5.4)
SODIUM SERPL-SCNC: 137 MMOL/L (ref 136–145)
WBC # BLD AUTO: 10.3 K/UL (ref 3.9–12.7)

## 2020-03-15 PROCEDURE — 63600175 PHARM REV CODE 636 W HCPCS: Performed by: FAMILY MEDICINE

## 2020-03-15 PROCEDURE — 83735 ASSAY OF MAGNESIUM: CPT

## 2020-03-15 PROCEDURE — 63600175 PHARM REV CODE 636 W HCPCS: Performed by: INTERNAL MEDICINE

## 2020-03-15 PROCEDURE — 25000003 PHARM REV CODE 250: Performed by: INTERNAL MEDICINE

## 2020-03-15 PROCEDURE — B4185 PARENTERAL SOL 10 GM LIPIDS: HCPCS | Performed by: FAMILY MEDICINE

## 2020-03-15 PROCEDURE — 27000221 HC OXYGEN, UP TO 24 HOURS

## 2020-03-15 PROCEDURE — 87086 URINE CULTURE/COLONY COUNT: CPT

## 2020-03-15 PROCEDURE — 86920 COMPATIBILITY TEST SPIN: CPT

## 2020-03-15 PROCEDURE — 85014 HEMATOCRIT: CPT

## 2020-03-15 PROCEDURE — 86900 BLOOD TYPING SEROLOGIC ABO: CPT

## 2020-03-15 PROCEDURE — A4217 STERILE WATER/SALINE, 500 ML: HCPCS | Performed by: FAMILY MEDICINE

## 2020-03-15 PROCEDURE — 94761 N-INVAS EAR/PLS OXIMETRY MLT: CPT

## 2020-03-15 PROCEDURE — 87077 CULTURE AEROBIC IDENTIFY: CPT

## 2020-03-15 PROCEDURE — C9113 INJ PANTOPRAZOLE SODIUM, VIA: HCPCS | Performed by: INTERNAL MEDICINE

## 2020-03-15 PROCEDURE — 84100 ASSAY OF PHOSPHORUS: CPT

## 2020-03-15 PROCEDURE — 25000003 PHARM REV CODE 250: Performed by: STUDENT IN AN ORGANIZED HEALTH CARE EDUCATION/TRAINING PROGRAM

## 2020-03-15 PROCEDURE — 85027 COMPLETE CBC AUTOMATED: CPT

## 2020-03-15 PROCEDURE — 85018 HEMOGLOBIN: CPT

## 2020-03-15 PROCEDURE — 80053 COMPREHEN METABOLIC PANEL: CPT

## 2020-03-15 PROCEDURE — 12000002 HC ACUTE/MED SURGE SEMI-PRIVATE ROOM

## 2020-03-15 PROCEDURE — P9016 RBC LEUKOCYTES REDUCED: HCPCS

## 2020-03-15 PROCEDURE — 25000003 PHARM REV CODE 250: Performed by: FAMILY MEDICINE

## 2020-03-15 PROCEDURE — 87186 SC STD MICRODIL/AGAR DIL: CPT

## 2020-03-15 PROCEDURE — 81001 URINALYSIS AUTO W/SCOPE: CPT

## 2020-03-15 RX ADMIN — HYDROCODONE BITARTRATE AND ACETAMINOPHEN 1 TABLET: 5; 325 TABLET ORAL at 11:03

## 2020-03-15 RX ADMIN — PSYLLIUM HUSK 2 PACKET: 3.4 POWDER ORAL at 09:03

## 2020-03-15 RX ADMIN — DIPHENOXYLATE HYDROCHLORIDE AND ATROPINE SULFATE 1 TABLET: 2.5; .025 TABLET ORAL at 09:03

## 2020-03-15 RX ADMIN — DIPHENHYDRAMINE HYDROCHLORIDE 6.25 MG: 50 INJECTION INTRAMUSCULAR; INTRAVENOUS at 11:03

## 2020-03-15 RX ADMIN — DIPHENHYDRAMINE HYDROCHLORIDE 6.25 MG: 50 INJECTION INTRAMUSCULAR; INTRAVENOUS at 12:03

## 2020-03-15 RX ADMIN — SERTRALINE HYDROCHLORIDE 50 MG: 50 TABLET ORAL at 08:03

## 2020-03-15 RX ADMIN — MAGNESIUM OXIDE 400 MG: 400 TABLET ORAL at 09:03

## 2020-03-15 RX ADMIN — LOPERAMIDE HYDROCHLORIDE 4 MG: 2 CAPSULE ORAL at 09:03

## 2020-03-15 RX ADMIN — HYDROCODONE BITARTRATE AND ACETAMINOPHEN 1 TABLET: 5; 325 TABLET ORAL at 05:03

## 2020-03-15 RX ADMIN — DRONABINOL 2.5 MG: 2.5 CAPSULE ORAL at 05:03

## 2020-03-15 RX ADMIN — BUTALBITAL, ACETAMINOPHEN AND CAFFEINE 1 TABLET: 50; 325; 40 TABLET ORAL at 07:03

## 2020-03-15 RX ADMIN — HYOSCYAMINE SULFATE 0.12 MG: 0.12 TABLET ORAL at 05:03

## 2020-03-15 RX ADMIN — LOPERAMIDE HYDROCHLORIDE 4 MG: 2 CAPSULE ORAL at 08:03

## 2020-03-15 RX ADMIN — PANTOPRAZOLE SODIUM 40 MG: 40 INJECTION, POWDER, LYOPHILIZED, FOR SOLUTION INTRAVENOUS at 08:03

## 2020-03-15 RX ADMIN — DIPHENOXYLATE HYDROCHLORIDE AND ATROPINE SULFATE 1 TABLET: 2.5; .025 TABLET ORAL at 08:03

## 2020-03-15 RX ADMIN — DRONABINOL 2.5 MG: 2.5 CAPSULE ORAL at 07:03

## 2020-03-15 RX ADMIN — HYDROCODONE BITARTRATE AND ACETAMINOPHEN 1 TABLET: 5; 325 TABLET ORAL at 12:03

## 2020-03-15 RX ADMIN — ACETAMINOPHEN 650 MG: 325 TABLET ORAL at 12:03

## 2020-03-15 RX ADMIN — DIPHENOXYLATE HYDROCHLORIDE AND ATROPINE SULFATE 1 TABLET: 2.5; .025 TABLET ORAL at 01:03

## 2020-03-15 RX ADMIN — SMOFLIPID: 6; 6; 5; 3 INJECTION, EMULSION INTRAVENOUS at 05:03

## 2020-03-15 RX ADMIN — DIPHENHYDRAMINE HYDROCHLORIDE 6.25 MG: 50 INJECTION INTRAMUSCULAR; INTRAVENOUS at 06:03

## 2020-03-15 RX ADMIN — MAGNESIUM OXIDE 400 MG: 400 TABLET ORAL at 08:03

## 2020-03-15 RX ADMIN — LOPERAMIDE HYDROCHLORIDE 4 MG: 2 CAPSULE ORAL at 01:03

## 2020-03-15 RX ADMIN — PANTOPRAZOLE SODIUM 40 MG: 40 INJECTION, POWDER, LYOPHILIZED, FOR SOLUTION INTRAVENOUS at 09:03

## 2020-03-15 RX ADMIN — DIPHENHYDRAMINE HYDROCHLORIDE 6.25 MG: 50 INJECTION INTRAMUSCULAR; INTRAVENOUS at 05:03

## 2020-03-15 RX ADMIN — LEVOTHYROXINE SODIUM 88 MCG: 88 TABLET ORAL at 05:03

## 2020-03-15 RX ADMIN — LOPERAMIDE HYDROCHLORIDE 4 MG: 2 CAPSULE ORAL at 05:03

## 2020-03-15 RX ADMIN — SALINE NASAL SPRAY 1 SPRAY: 1.5 SOLUTION NASAL at 09:03

## 2020-03-15 RX ADMIN — DIPHENOXYLATE HYDROCHLORIDE AND ATROPINE SULFATE 1 TABLET: 2.5; .025 TABLET ORAL at 05:03

## 2020-03-15 NOTE — PROGRESS NOTES
[facility] Medicine  Progress Note    Patient Name: Shara Hutchins  MRN: [id number]  Patient Class: IP- Inpatient   Admission Date: 3/7/2020  Length of Stay: 6 days  Attending Physician: Grant Ravi MD  Primary Care Provider: April Horton MD        Subjective:     Principal Problem:Chronic diarrhea    Interval History:  Urology consulted yesterday due to difficult Godwin placement.  Reports feeling well.  Noticed that her stools have been more formed.  Notice left the upper extremity pitting edema.  Denies any chest pain, shortness of breath.  Will attempt to discontinue octreotide.  H&H decreasing.  Transfuse 1 PRBC.  Patient reports taking Procrit as an outpatient.  Nephrology consulted.    Review of Systems   Constitutional: Negative for chills, fatigue, fever and unexpected weight change.   HENT: Negative for sore throat.    Eyes: Negative for visual disturbance.   Respiratory: Negative for cough, chest tightness and shortness of breath.    Cardiovascular: Negative for chest pain.   Gastrointestinal: Negative for abdominal pain, constipation, diarrhea, nausea and vomiting.   Endocrine: Negative for polyuria.   Genitourinary: Negative for dysuria and hematuria.   Neurological: Negative for headaches.     Objective:     Vital Signs (Most Recent):  Temp: 98.1 °F (36.7 °C) (03/15/20 0816)  Pulse: 78 (03/15/20 0848)  Resp: 16 (03/15/20 0848)  BP: 126/62 (03/15/20 0816)  SpO2: 95 % (03/15/20 0848) Vital Signs (24h Range):  Temp:  [97.7 °F (36.5 °C)-99.6 °F (37.6 °C)] 98.1 °F (36.7 °C)  Pulse:  [73-92] 78  Resp:  [16-18] 16  SpO2:  [90 %-95 %] 95 %  BP: (126-153)/(62-76) 126/62     Weight: 63.5 kg (139 lb 15.9 oz)  Body mass index is 21.6 kg/m².    Intake/Output Summary (Last 24 hours) at 3/15/2020 1209  Last data filed at 3/15/2020 0700  Gross per 24 hour   Intake 2100 ml   Output 4950 ml   Net -2850 ml      Physical Exam   Constitutional: She is oriented to person, place, and time. No  distress.   HENT:   Head: Normocephalic and atraumatic.   Right Ear: External ear normal.   Left Ear: External ear normal.   Eyes: Conjunctivae and EOM are normal. Right eye exhibits no discharge. Left eye exhibits no discharge.   Neck: Normal range of motion.   Cardiovascular: Normal rate and regular rhythm.   No murmur heard.  Pulmonary/Chest: Effort normal and breath sounds normal. No respiratory distress.   S/p port   Abdominal: Soft. She exhibits no distension. There is no tenderness.   S/p ostomy   Genitourinary:   Genitourinary Comments: lee   Musculoskeletal: Normal range of motion.   Neurological: She is alert and oriented to person, place, and time.   Skin: She is not diaphoretic.   Psychiatric: She has a normal mood and affect. Her behavior is normal. Judgment and thought content normal.       Significant Labs:   CBC:   Recent Labs   Lab 03/14/20  0504 03/15/20  0424 03/15/20  0840   WBC 8.04 10.30  --    HGB 7.8* 7.3* 6.9*   HCT 25.4* 22.8* 22.1*   * 163  --      CMP:   Recent Labs   Lab 03/14/20  0504 03/15/20  0424    137   K 3.9 4.0    104   CO2 25 25    111*   BUN 37* 41*   CREATININE 1.7* 2.0*   CALCIUM 8.0* 7.8*   PROT 4.7* 4.9*   ALBUMIN 2.1* 2.2*   BILITOT 1.1* 1.1*   ALKPHOS 197* 208*   AST 19 20   ALT 12 12   ANIONGAP 5* 8   EGFRNONAA 30.3* 24.9*           Assessment/Plan:      Active Hospital Problems    Diagnosis    *Chronic diarrhea    Malnutrition    Acute renal failure superimposed on stage 5 chronic kidney disease, not on chronic dialysis    Thrombocytopenia, unspecified    Debility    Unstageable pressure injury of skin and tissue    DNR (do not resuscitate)    S/P ileostomy    Hypothyroidism    Anemia of chronic disease    Hisotry of Vulvar cancer    Iron deficiency anemia due to chronic blood loss    Inflammatory bowel disease       Malnutrition  Continue TPN until ostomy output decreased  Trial of discontinue octreotide drip  Discussing with elma  for octreotide IM monthly  On TPN for nutrition support with suspected short gut syndrome  Will need reversal of ostomy with General surgery in several weeks  Awaiting for LTAC placement  PT/OT  Unable to give chemical DVT prophylaxis due to thrombocytopenia    Unstageable pressure injury of skin and tissue  Wound care ordered      Iron deficiency anemia due to chronic blood loss  Transfuse 1 unit of prbc's      VTE Risk Mitigation (From admission, onward)         Ordered     Place sequential compression device  Until discontinued      03/08/20 0133                      Grant Ravi MD  Department of Hospital Medicine   [facility]  Date of service: 03/15/2020

## 2020-03-15 NOTE — PLAN OF CARE
Problem: Adult Inpatient Plan of Care  Goal: Plan of Care Review  Outcome: Ongoing, Progressing  Goal: Patient-Specific Goal (Individualization)  Outcome: Ongoing, Progressing  Goal: Absence of Hospital-Acquired Illness or Injury  Outcome: Ongoing, Progressing  Goal: Optimal Comfort and Wellbeing  Outcome: Ongoing, Progressing

## 2020-03-15 NOTE — PLAN OF CARE
03/15/20 0848   Patient Assessment/Suction   Level of Consciousness (AVPU) alert   Respiratory Effort Normal;Unlabored   Expansion/Accessory Muscles/Retractions no use of accessory muscles   PRE-TX-O2   O2 Device (Oxygen Therapy) nasal cannula   $ Is the patient on Low Flow Oxygen? Yes   Flow (L/min) 1   SpO2 95 %   Pulse Oximetry Type Intermittent   $ Pulse Oximetry - Multiple Charge Pulse Oximetry - Multiple   Pulse 78   Resp 16

## 2020-03-15 NOTE — ASSESSMENT & PLAN NOTE
Continue TPN until ostomy output decreased  Trial of discontinue octreotide drip  Discussing with sw for octreotide IM monthly  On TPN for nutrition support with suspected short gut syndrome  Will need reversal of ostomy with General surgery in several weeks  Awaiting for LTAC placement  PT/OT  Unable to give chemical DVT prophylaxis due to thrombocytopenia

## 2020-03-15 NOTE — PT/OT/SLP PROGRESS
Physical Therapy      Patient Name:  Shara Hutchins   MRN:  [id number]    Patient not seen today secondary to Other (Comment), Patient fatigue(test in early am then refuses later in am reporting fatigue and to receive blood). Will follow-up 3/16/2020.    Dinora Ridley, PT

## 2020-03-15 NOTE — NURSING
Godwin with slow flow, patient feeling pain and spasms.  Flushed Godwin without difficulty.  Noted urine flow without difficulty after flush

## 2020-03-15 NOTE — SUBJECTIVE & OBJECTIVE
Interval History:  Urology consulted yesterday due to difficult Godwin placement.  Reports feeling well.  Noticed that her stools have been more formed.  Notice left the upper extremity pitting edema.  Denies any chest pain, shortness of breath.  Will attempt to discontinue octreotide.  H&H decreasing.  Transfuse 1 PRBC.  Patient reports taking Procrit as an outpatient.  Nephrology consulted.    Review of Systems   Constitutional: Negative for chills, fatigue, fever and unexpected weight change.   HENT: Negative for sore throat.    Eyes: Negative for visual disturbance.   Respiratory: Negative for cough, chest tightness and shortness of breath.    Cardiovascular: Negative for chest pain.   Gastrointestinal: Negative for abdominal pain, constipation, diarrhea, nausea and vomiting.   Endocrine: Negative for polyuria.   Genitourinary: Negative for dysuria and hematuria.   Neurological: Negative for headaches.     Objective:     Vital Signs (Most Recent):  Temp: 98.1 °F (36.7 °C) (03/15/20 0816)  Pulse: 78 (03/15/20 0848)  Resp: 16 (03/15/20 0848)  BP: 126/62 (03/15/20 0816)  SpO2: 95 % (03/15/20 0848) Vital Signs (24h Range):  Temp:  [97.7 °F (36.5 °C)-99.6 °F (37.6 °C)] 98.1 °F (36.7 °C)  Pulse:  [73-92] 78  Resp:  [16-18] 16  SpO2:  [90 %-95 %] 95 %  BP: (126-153)/(62-76) 126/62     Weight: 63.5 kg (139 lb 15.9 oz)  Body mass index is 21.6 kg/m².    Intake/Output Summary (Last 24 hours) at 3/15/2020 1209  Last data filed at 3/15/2020 0700  Gross per 24 hour   Intake 2100 ml   Output 4950 ml   Net -2850 ml      Physical Exam   Constitutional: She is oriented to person, place, and time. No distress.   HENT:   Head: Normocephalic and atraumatic.   Right Ear: External ear normal.   Left Ear: External ear normal.   Eyes: Conjunctivae and EOM are normal. Right eye exhibits no discharge. Left eye exhibits no discharge.   Neck: Normal range of motion.   Cardiovascular: Normal rate and regular rhythm.   No murmur  heard.  Pulmonary/Chest: Effort normal and breath sounds normal. No respiratory distress.   S/p port   Abdominal: Soft. She exhibits no distension. There is no tenderness.   S/p ostomy   Genitourinary:   Genitourinary Comments: lee   Musculoskeletal: Normal range of motion.   Neurological: She is alert and oriented to person, place, and time.   Skin: She is not diaphoretic.   Psychiatric: She has a normal mood and affect. Her behavior is normal. Judgment and thought content normal.       Significant Labs:   CBC:   Recent Labs   Lab 03/14/20  0504 03/15/20  0424 03/15/20  0840   WBC 8.04 10.30  --    HGB 7.8* 7.3* 6.9*   HCT 25.4* 22.8* 22.1*   * 163  --      CMP:   Recent Labs   Lab 03/14/20  0504 03/15/20  0424    137   K 3.9 4.0    104   CO2 25 25    111*   BUN 37* 41*   CREATININE 1.7* 2.0*   CALCIUM 8.0* 7.8*   PROT 4.7* 4.9*   ALBUMIN 2.1* 2.2*   BILITOT 1.1* 1.1*   ALKPHOS 197* 208*   AST 19 20   ALT 12 12   ANIONGAP 5* 8   EGFRNONAA 30.3* 24.9*

## 2020-03-15 NOTE — PROGRESS NOTES
[facility]  Adult Nutrition   Progress Note (Nutrition Support Management)    SUMMARY     Recommendations  Recommendation/Intervention: 1. New TPN ordered to be initiated @ 1700 2. Continue PO diet as tolerated by pt 3. NST to follow and monitor labs and manage TPN accordingly.  Goals: 1. Tolerance of TPN/PO diet   Nutrition Goal Status: progressing towards goal  Communication of RD Recs: reviewed with RN    Dietitian Rounds Brief    Pt seen for f/u. Tolerating PO diet. Intake varied depending upon menu selections. Overall, tolerating current diet. No c/o voiced. No N/V. + output via ostomy--becoming more formed per MD notes/patient.  RD to communicate prefs to kitchen.     PARENTERAL NUTRITION PROGRESS NOTE:      Parenteral Nutrition Day # 15  Diagnosis/Indication for PN: high ostomy output, severe malnutrition   IV Access: PICC  Diet/Tube Feeding: bland diet          Admixture Type: 3-in-1 TPN, custom  Infusion Rate and Frequency: 65 ml/hr   Patient Acuity: Acute care    PN Composition:   96 grams Amino Acid, 301 grams Dextrose, and 60 grams Lipid.    Today's TPN provides 2007 kcal.    Please see order for electrolytes and additives content and adjustments.   *The Nutrition Support Team will continue to monitor electrolytes daily and adjust parenteral nutrition as warranted.      Reason for Assessment  Reason For Assessment: new TPN, RD follow-up    Nutrition Risk Screen  Nutrition Risk Screen: tube feeding or parenteral nutrition       Ileostomy 02/13/20 1600 Loop RLQ-Wound Image: Images linked       Pressure Injury 02/27/20 1837 Left Buttocks Stage 2-Wound Image: Images linked  MST Score: 0  Have you recently lost weight without trying?: No(not since coming to hospital)  Weight loss score: 0  Have you been eating poorly because of a decreased appetite?: No  Appetite score: 0       Nutrition/Diet History  Spiritual, Cultural Beliefs, Yazidi Practices, Values that Affect Care: yes(pt states she  "is Hindu, she believes in God)  Food Allergies: NKFA  Factors Affecting Nutritional Intake: malabsorption, altered gastrointestinal function  Nutrition Support Formula Prior to Admit: Other (see comments)(Custom TPN )    Anthropometrics  Temp: 98.1 °F (36.7 °C)  Height Method: Stated  Height: 5' 7.5" (171.5 cm)  Height (inches): 67.5 in  Weight Method: Bed Scale  Weight: 63.5 kg (139 lb 15.9 oz)  Weight (lb): 139.99 lb  Ideal Body Weight (IBW), Female: 137.5 lb  % Ideal Body Weight, Female (lb): 101.81 %  BMI (Calculated): 21.6  BMI Grade: 18.5-24.9 - normal       Weight History:  Wt Readings from Last 10 Encounters:   03/08/20 63.5 kg (139 lb 15.9 oz)   03/06/20 53 kg (116 lb 13.5 oz)   03/07/20 52.6 kg (116 lb)   03/03/20 53 kg (116 lb 13.5 oz)   02/19/20 58.7 kg (129 lb 4.8 oz)   02/19/20 58.8 kg (129 lb 9.6 oz)   02/14/20 56.7 kg (125 lb)   02/05/20 56.9 kg (125 lb 6.4 oz)   01/22/20 57.2 kg (126 lb)   01/08/20 61.4 kg (135 lb 6.4 oz)       Lab/Procedures/Meds: Pertinent Labs Reviewed  Clinical Chemistry:  Recent Labs   Lab 03/13/20  0549 03/14/20  0504 03/15/20  0424    139 137   K 4.4 3.9 4.0    109 104   CO2 27 25 25    110 111*   BUN 34* 37* 41*   CREATININE 1.7* 1.7* 2.0*   CALCIUM 7.9* 8.0* 7.8*   PROT 4.8* 4.7* 4.9*   ALBUMIN 2.3* 2.1* 2.2*   BILITOT 1.1* 1.1* 1.1*   ALKPHOS 197* 197* 208*   AST 19 19 20   ALT 13 12 12   ANIONGAP 7* 5* 8   ESTGFRAFRICA 35.0* 35.0* 28.7*   EGFRNONAA 30.3* 30.3* 24.9*   MG 2.2 2.2 2.3   PHOS 3.7 4.4 4.5     CBC:   Recent Labs   Lab 03/15/20  0424 03/15/20  0840   WBC 10.30  --    RBC 2.31*  --    HGB 7.3* 6.9*   HCT 22.8* 22.1*     --    MCV 99*  --    MCH 31.6*  --    MCHC 32.0  --      Lipid Panel:  Recent Labs   Lab 03/09/20  0243   TRIG 80     Cardiac Profile:  No results for input(s): BNP, CPK, CPKMB, TROPONINI, CKTOTAL in the last 168 hours.  Inflammatory Labs:  No results for input(s): CRP in the last 168 hours.  Diabetes:  No results " for input(s): HGBA1C, POCTGLUCOSE in the last 168 hours.  Thyroid & Parathyroid:  Recent Labs   Lab 03/14/20  0504   TSH 3.770   FREET4 0.84     Vitamins:  No results for input(s): DGZZULLR23, [id number] in the last 168 hours.    Medications: Pertinent Medications reviewed  Scheduled Meds:   diphenhydrAMINE  6.25 mg Intravenous Q6H    diphenoxylate-atropine 2.5-0.025 mg  1 tablet Oral QID    dronabinoL  2.5 mg Oral BID AC    furosemide (LASIX) IV  20 mg Intravenous Once    levothyroxine  88 mcg Oral Before breakfast    loperamide  4 mg Oral QID    magnesium oxide  400 mg Oral BID    pantoprazole  40 mg Intravenous BID    psyllium husk (with sugar)  2 packet Oral TID    sertraline  50 mg Oral Daily    sodium chloride  1 spray Each Nostril Daily     Continuous Infusions:   dextrose 10 % in water (D10W)      lactated ringers 75 mL/hr at 03/15/20 0850    TPN ADULT CENTRAL LINE CUSTOM (3 in 1) 65 mL/hr at 03/14/20 1648    TPN ADULT CENTRAL LINE CUSTOM (3 in 1)       PRN Meds:.sodium chloride, acetaminophen, butalbital-acetaminophen-caffeine -40 mg, calcium chloride IVPB, calcium chloride IVPB, calcium chloride IVPB, calcium chloride IVPB, calcium chloride IVPB, calcium chloride IVPB, dextrose 10 % in water (D10W), dextrose 50%, dextrose 50%, glucagon (human recombinant), glucose, glucose, HYDROcodone-acetaminophen, hyoscyamine, magnesium oxide, magnesium oxide, magnesium sulfate IVPB, magnesium sulfate IVPB, magnesium sulfate IVPB, magnesium sulfate IVPB, magnesium sulfate IVPB, magnesium sulfate IVPB, magnesium sulfate IVPB, magnesium sulfate IVPB, melatonin, ondansetron, potassium chloride in water, potassium chloride in water, potassium chloride in water, potassium chloride in water, potassium chloride in water, potassium chloride in water, potassium chloride in water, potassium chloride in water, potassium chloride, potassium chloride, potassium chloride, potassium chloride, potassium chloride,  potassium chloride, potassium chloride, potassium chloride, sodium chloride 0.9%, sodium phosphate IVPB, sodium phosphate IVPB, sodium phosphate IVPB, sodium phosphate IVPB, sodium phosphate IVPB, sodium phosphate IVPB, sodium phosphate IVPB, sodium phosphate IVPB, sodium phosphate IVPB, sodium phosphate IVPB    Estimated/Assessed Needs  Weight Used For Calorie Calculations: 63.5 kg (139 lb 15.9 oz)  Energy Calorie Requirements (kcal): 1905 - 2223 (30 - 35)   Energy Need Method: Kcal/kg  Protein Requirements: 95 - 127 (1.5 - 2 g/kg)   Weight Used For Protein Calculations: 63.5 kg (139 lb 15.9 oz)     Estimated Fluid Requirement Method: RDA Method  RDA Method (mL): 1905       Nutrition Prescription Ordered  Current Diet Order: bland  Nutrition Order Comments: no concentrated sweets, juices  Current Nutrition Support Formula Ordered: Other (Comment)(custom 3-in-1 TPN)  Current Nutrition Support Rate Ordered: 65 (ml)  Current Nutrition Support Frequency Ordered:  ml/hr    Evaluation of Received Nutrient/Fluid Intake  Parenteral Calories (kcal): 2007  Parenteral Protein (gm): 96  Parenteral Fluid (mL): 1560  IV Fluid (mL): 1200(LR @ 50 ml/hr)  Energy Calories Required: meeting needs  Protein Required: meeting needs  Fluid Required: meeting needs  Tolerance: tolerating     Intake/Output Summary (Last 24 hours) at 3/15/2020 1012  Last data filed at 3/15/2020 0700  Gross per 24 hour   Intake 2100 ml   Output 4950 ml   Net -2850 ml        % Meal Intake: 50 - 75 %    Nutrition Risk  Level of Risk/Frequency of Follow-up: high     Monitor and Evaluation  Food and Nutrient Intake: parenteral nutrition intake, energy intake, food and beverage intake  Food and Nutrient Adminstration: enteral and parenteral nutrition administration, diet order  Physical Activity and Function: nutrition-related ADLs and IADLs  Anthropometric Measurements: weight, weight change  Biochemical Data, Medical Tests and Procedures: electrolyte and renal  panel, gastrointestinal profile, glucose/endocrine profile  Nutrition-Focused Physical Findings: overall appearance     Nutrition Follow-Up  RD Follow-up?: Yes    Altagracia Mcdonough RD 03/15/2020 10:12 AM

## 2020-03-15 NOTE — CONSULTS
INPATIENT NEPHROLOGY CONSULT   [facility]    Patient Name: Shara Hutchins  Date: 03/15/2020    Reason for consultation: DARCI/CKD    Chief Complaint: Diarrhea    History of Present Illness:  68 y/o F with hx of HTN, NIDDM-2, CKD-3, vulvar cancer (s/p chemo/radiation), and now s/p ileostomy after pneumatosis intestinalis earlier in 2020 and with persistent diarrhea 2/2 chemotherapy who p/w high ostomy output, treated with TPN/octreotide, awaiting LTAC, developed gross hematuria 3/13, noted to have bump in Cr and Hb drop, consulted for DARCI and anemia.    Plan of Care:    1. DARCI/CKD III  - this is prerenal  - on LR 75cc/hr  - monitor UOP  - no nsaids or IV contrast    2. Chronic volume depletion 2/2 diarrhea  - monitor ostomy output  - on LR + TPN  - on multiple stool bulking agents, octreotide; surgery in future    3. Gross hematuria  - clearing in lee    4. Acute on chronic anemia of CKD  - transfuse 1 unit of blood today  - ordered ADAM weekly 100 units/kg SQ to start Monday    5. Edema  - ok with lasix PRN    Thank you for allowing us to participate in this patient's care. We will continue to follow.    Vital Signs:  Temp Readings from Last 3 Encounters:   03/15/20 98.1 °F (36.7 °C)   03/07/20 98.2 °F (36.8 °C) (Oral)   03/05/20 97.8 °F (36.6 °C) (Oral)       Pulse Readings from Last 3 Encounters:   03/15/20 78   03/07/20 (!) 57   03/05/20 61       BP Readings from Last 3 Encounters:   03/15/20 126/62   03/07/20 131/61   03/05/20 (!) 169/74       Weight:  Wt Readings from Last 3 Encounters:   03/08/20 63.5 kg (139 lb 15.9 oz)   03/06/20 53 kg (116 lb 13.5 oz)   03/07/20 52.6 kg (116 lb)       Past Medical & Surgical History:  Past Medical History:   Diagnosis Date    Chronic kidney disease, stage III (moderate) 1/7/2018    Diabetes mellitus, type 2     Fatigue     Hypertension     Iron deficiency anemia due to chronic blood loss 1/7/2018    Vulvar cancer 1/7/2019       Past Surgical History:    Procedure Laterality Date    ABDOMINAL SURGERY      BACK SURGERY      carpel tunnel surgery once on each hand      CERVICAL FUSION      x 4    cervix repair      CHOLECYSTECTOMY      DIAGNOSTIC LAPAROSCOPY N/A 2020    Procedure: LAPAROSCOPY, DIAGNOSTIC;  Surgeon: Robbi Lovell III, MD;  Location: [facility];  Service: General;  Laterality: N/A;    HYSTERECTOMY  1980    ILEOSTOMY N/A 2020    Procedure: CREATION, ILEOSTOMY Loop;  Surgeon: Robbi Lovell III, MD;  Location: [facility] OR;  Service: General;  Laterality: N/A;    LUMBAR LAMINECTOMY      LYSIS OF ADHESIONS N/A 2020    Procedure: LYSIS, ADHESIONS;  Surgeon: Robbi Lovell III, MD;  Location: [facility] OR;  Service: General;  Laterality: N/A;    NECK SURGERY      PHLEBOGRAPHY  2020    Procedure: VENOGRAM;  Surgeon: Robbi Lovell III, MD;  Location: [facility];  Service: General;;    REMOVAL OF VASCULAR ACCESS PORT Right 2020    Procedure: REMOVAL, VASCULAR ACCESS PORT;  Surgeon: Robbi Lovell III, MD;  Location: [facility];  Service: General;  Laterality: Right;    SHOULDER SURGERY      vulva cancer         Past Social History:  Social History     Socioeconomic History    Marital status:      Spouse name: Not on file    Number of children: Not on file    Years of education: Not on file    Highest education level: Not on file   Occupational History    Not on file   Social Needs    Financial resource strain: Not on file    Food insecurity:     Worry: Not on file     Inability: Not on file    Transportation needs:     Medical: Not on file     Non-medical: Not on file   Tobacco Use    Smoking status: Former Smoker     Packs/day: 1.00     Years: 33.00     Pack years: 33.00     Types: Cigarettes     Last attempt to quit: 2000     Years since quittin.6    Smokeless tobacco: Never Used   Substance and Sexual Activity    Alcohol use: No    Drug use: No    Sexual activity: Not on file    Lifestyle    Physical activity:     Days per week: Not on file     Minutes per session: Not on file    Stress: Not on file   Relationships    Social connections:     Talks on phone: Not on file     Gets together: Not on file     Attends [employer] service: Not on file     Active member of club or organization: Not on file     Attends meetings of clubs or organizations: Not on file     Relationship status: Not on file   Other Topics Concern    Not on file   Social History Narrative    Not on file       Medications:  Scheduled Meds:   diphenhydrAMINE  6.25 mg Intravenous Q6H    diphenoxylate-atropine 2.5-0.025 mg  1 tablet Oral QID    dronabinoL  2.5 mg Oral BID AC    [START ON 3/16/2020] epoetin nica-ebpx (RETACRIT) injection  100 Units/kg Subcutaneous Q7 Days    furosemide (LASIX) IV  20 mg Intravenous Once    levothyroxine  88 mcg Oral Before breakfast    loperamide  4 mg Oral QID    magnesium oxide  400 mg Oral BID    pantoprazole  40 mg Intravenous BID    psyllium husk (with sugar)  2 packet Oral TID    sertraline  50 mg Oral Daily    sodium chloride  1 spray Each Nostril Daily     Continuous Infusions:   dextrose 10 % in water (D10W)      lactated ringers 75 mL/hr at 03/15/20 0850    TPN ADULT CENTRAL LINE CUSTOM (3 in 1) 65 mL/hr at 03/14/20 1648    TPN ADULT CENTRAL LINE CUSTOM (3 in 1)       PRN Meds:.sodium chloride, acetaminophen, butalbital-acetaminophen-caffeine -40 mg, calcium chloride IVPB, calcium chloride IVPB, calcium chloride IVPB, calcium chloride IVPB, calcium chloride IVPB, calcium chloride IVPB, dextrose 10 % in water (D10W), dextrose 50%, dextrose 50%, glucagon (human recombinant), glucose, glucose, HYDROcodone-acetaminophen, hyoscyamine, magnesium oxide, magnesium oxide, magnesium sulfate IVPB, magnesium sulfate IVPB, magnesium sulfate IVPB, magnesium sulfate IVPB, magnesium sulfate IVPB, magnesium sulfate IVPB, magnesium sulfate IVPB, magnesium sulfate IVPB,  melatonin, ondansetron, potassium chloride in water, potassium chloride in water, potassium chloride in water, potassium chloride in water, potassium chloride in water, potassium chloride in water, potassium chloride in water, potassium chloride in water, potassium chloride, potassium chloride, potassium chloride, potassium chloride, potassium chloride, potassium chloride, potassium chloride, potassium chloride, sodium chloride 0.9%, sodium phosphate IVPB, sodium phosphate IVPB, sodium phosphate IVPB, sodium phosphate IVPB, sodium phosphate IVPB, sodium phosphate IVPB, sodium phosphate IVPB, sodium phosphate IVPB, sodium phosphate IVPB, sodium phosphate IVPB  No current facility-administered medications on file prior to encounter.      Current Outpatient Medications on File Prior to Encounter   Medication Sig Dispense Refill    acetaminophen (TYLENOL) 325 MG tablet Take 2 tablets (650 mg total) by mouth every 4 (four) hours as needed.  0    cholestyramine-aspartame (QUESTRAN LIGHT) 4 gram PwPk Take 1 packet (4 g total) by mouth 2 (two) times daily. 180 packet 3    dextrose 50% injection Inject 25 mLs (12.5 g total) into the vein as needed (between 51 - 70 mg/dL if patient cannnot take PO but has IV access.).      dextrose 50% injection Inject 50 mLs (25 g total) into the vein as needed (less than 50 mg/dL if patient cannnot take PO but has IV access.).      diphenhydrAMINE (BENADRYL) 50 mg/mL injection Inject 0.125 mLs (6.25 mg total) into the vein every 6 (six) hours.      glucose 4 GM chewable tablet Take 4 tablets (16 g total) by mouth as needed.  12    glucose 4 GM chewable tablet Take 6 tablets (24 g total) by mouth as needed.  12    HYDROcodone-acetaminophen (NORCO) 5-325 mg per tablet Take 1 tablet by mouth every 6 (six) hours as needed.  0    levothyroxine (SYNTHROID) 88 MCG tablet Take 1 tablet (88 mcg total) by mouth before breakfast. 30 tablet 11    loperamide (IMODIUM) 2 mg capsule Take 2  "capsules (4 mg total) by mouth 4 (four) times daily as needed for Diarrhea. 28 capsule 0    loperamide (IMODIUM) 2 mg capsule Take 2 capsules (4 mg total) by mouth 4 (four) times daily. for 10 days  0    melatonin (MELATIN) 3 mg tablet Take 3 tablets (9 mg total) by mouth nightly as needed for Insomnia.  0    ondansetron (ZOFRAN-ODT) 8 MG TbDL Take 1 tablet (8 mg total) by mouth every 8 (eight) hours as needed.      oxyCODONE-acetaminophen (PERCOCET) 5-325 mg per tablet Take 1 tablet by mouth every 4 (four) hours as needed.  0    pantoprazole (PROTONIX) 40 mg injection Inject 40 mg into the vein 2 (two) times daily. 2400 mg 11    psyllium husk, with sugar, 3.4 gram packet Take 2 packets by mouth 3 (three) times daily.      Ringer's solution,lactated (LACTATED RINGERS) infusion Inject 125 mL/hr into the vein continuous.         Allergies:  Ciprofloxacin and Pcn [penicillins]    Past Family History:  Reviewed; refer to Hospitalist Admission Note    Review of Systems:  Review of Systems - All 14 systems reviewed and negative, except as noted in HPI    Physical Exam:  /62   Pulse 78   Temp 98.1 °F (36.7 °C)   Resp 16   Ht 5' 7.5" (1.715 m)   Wt 63.5 kg (139 lb 15.9 oz)   SpO2 95%   Breastfeeding? No   BMI 21.60 kg/m²     INS/OUTS:  I/O last 3 completed shifts:  In: 6060 [P.O.:2560; I.V.:720; Other:2000]  Out: 6750 [Urine:4700; Stool:2050]  No intake/output data recorded.    General Appearance:    NAD, AAO x 3, cooperative, appears stated age   Head:    Normocephalic, atraumatic   Eyes:    PER, EOMI, and conjunctiva/sclera clear bilaterally        Mouth:   Moist mucus membranes, no thrush or oral lesions, normal      dentition   Back:     No CVA tenderness   Lungs:     Clear to auscultation bilaterally, no wheeze, crackles, rales      or rhonchi, symmetric air movement, respirations unlabored   Heart:    Regular rate and rhythm, S1 and S2 normal, no murmur, rub   or gallop   Abdomen:     Soft, " non-tender, non-distended, bowel sounds active all four   quadrants, no RT or guarding, no masses, no organomegaly   Extremities:   Warm and well perfused, distal pulses intact, no cyanosis or    peripheral edema   MSK:   No joint or muscle swelling, tenderness or deformity   Skin:   Skin color, texture, turgor normal, no rashes or lesions   Neurologic/Psychiatric:   CNII-XII intact, normal strength and sensation       throughout, no asterixis; normal affect, memory, judgement     and insight     Results:  Lab Results   Component Value Date     03/15/2020    K 4.0 03/15/2020     03/15/2020    CO2 25 03/15/2020    BUN 41 (H) 03/15/2020    CREATININE 2.0 (H) 03/15/2020    CALCIUM 7.8 (L) 03/15/2020    ANIONGAP 8 03/15/2020    ESTGFRAFRICA 28.7 (A) 03/15/2020    EGFRNONAA 24.9 (A) 03/15/2020       Lab Results   Component Value Date    CALCIUM 7.8 (L) 03/15/2020    PHOS 4.5 03/15/2020       Recent Labs   Lab 03/15/20  0424 03/15/20  0840   WBC 10.30  --    RBC 2.31*  --    HGB 7.3* 6.9*   HCT 22.8* 22.1*     --    MCV 99*  --    MCH 31.6*  --    MCHC 32.0  --        I have personally reviewed pertinent radiological imaging and reports.    I have spent > 70 minutes providing care for this patient for the above diagnoses. These services have included chart/data/imaging review, evaluation, exam, formulation of plan, , note preparation, and discussions with staff involved in this patient's care.    Yasmin Parker MD MPH  [facility]  [address] [place] [id number]  [phone] (p)  [phone] (f)

## 2020-03-15 NOTE — PLAN OF CARE
Problem: Parenteral Nutrition  Goal: Effective Intravenous Nutrition Therapy Delivery  Outcome: Ongoing, Progressing  Intervention: Optimize Intravenous Nutrition Delivery  Flowsheets (Taken 3/15/2020 1015)  Nutrition Support Management: parenteral nutrition composition adjusted

## 2020-03-16 LAB
ALBUMIN SERPL BCP-MCNC: 2.3 G/DL (ref 3.5–5.2)
ALLENS TEST: ABNORMAL
ALP SERPL-CCNC: 191 U/L (ref 55–135)
ALT SERPL W/O P-5'-P-CCNC: 13 U/L (ref 10–44)
ANION GAP SERPL CALC-SCNC: 8 MMOL/L (ref 8–16)
AST SERPL-CCNC: 22 U/L (ref 10–40)
BACTERIA #/AREA URNS HPF: NEGATIVE /HPF
BILIRUB SERPL-MCNC: 1.8 MG/DL (ref 0.1–1)
BILIRUB UR QL STRIP: ABNORMAL
BUN SERPL-MCNC: 39 MG/DL (ref 8–23)
CALCIUM SERPL-MCNC: 7.9 MG/DL (ref 8.7–10.5)
CHLORIDE SERPL-SCNC: 107 MMOL/L (ref 95–110)
CLARITY UR: CLEAR
CO2 SERPL-SCNC: 23 MMOL/L (ref 23–29)
COLOR UR: YELLOW
CREAT SERPL-MCNC: 1.8 MG/DL (ref 0.5–1.4)
DELSYS: ABNORMAL
ERYTHROCYTE [DISTWIDTH] IN BLOOD BY AUTOMATED COUNT: 17.3 % (ref 11.5–14.5)
EST. GFR  (AFRICAN AMERICAN): 32.6 ML/MIN/1.73 M^2
EST. GFR  (NON AFRICAN AMERICAN): 28.3 ML/MIN/1.73 M^2
FIO2: 36
FLOW: 3.5
GLUCOSE SERPL-MCNC: 122 MG/DL (ref 70–110)
GLUCOSE SERPL-MCNC: 124 MG/DL (ref 70–110)
GLUCOSE SERPL-MCNC: 126 MG/DL (ref 70–110)
GLUCOSE SERPL-MCNC: 134 MG/DL (ref 70–110)
GLUCOSE SERPL-MCNC: 135 MG/DL (ref 70–110)
GLUCOSE SERPL-MCNC: 146 MG/DL (ref 70–110)
GLUCOSE UR QL STRIP: ABNORMAL
HCO3 UR-SCNC: 24.9 MMOL/L (ref 24–28)
HCT VFR BLD AUTO: 27.2 % (ref 37–48.5)
HCT VFR BLD CALC: 24 %PCV (ref 36–54)
HGB BLD-MCNC: 8.6 G/DL (ref 12–16)
HGB UR QL STRIP: ABNORMAL
HYALINE CASTS #/AREA URNS LPF: 38 /LPF
KETONES UR QL STRIP: ABNORMAL
LEUKOCYTE ESTERASE UR QL STRIP: ABNORMAL
MAGNESIUM SERPL-MCNC: 2.3 MG/DL (ref 1.6–2.6)
MCH RBC QN AUTO: 30.5 PG (ref 27–31)
MCHC RBC AUTO-ENTMCNC: 31.6 G/DL (ref 32–36)
MCV RBC AUTO: 97 FL (ref 82–98)
MICROSCOPIC COMMENT: ABNORMAL
MODE: ABNORMAL
NITRITE UR QL STRIP: POSITIVE
PCO2 BLDA: 39.9 MMHG (ref 35–45)
PH SMN: 7.4 [PH] (ref 7.35–7.45)
PH UR STRIP: 8 [PH] (ref 5–8)
PHOSPHATE SERPL-MCNC: 4.1 MG/DL (ref 2.7–4.5)
PLATELET # BLD AUTO: 182 K/UL (ref 150–350)
PMV BLD AUTO: 11.8 FL (ref 9.2–12.9)
PO2 BLDA: 58 MMHG (ref 80–100)
POC BE: 0 MMOL/L
POC IONIZED CALCIUM: 1.22 MMOL/L (ref 1.06–1.42)
POC SATURATED O2: 90 % (ref 95–100)
POC TCO2: 26 MMOL/L (ref 23–27)
POTASSIUM BLD-SCNC: 4.2 MMOL/L (ref 3.5–5.1)
POTASSIUM SERPL-SCNC: 4 MMOL/L (ref 3.5–5.1)
PREALB SERPL-MCNC: 11 MG/DL (ref 20–43)
PROT SERPL-MCNC: 5.3 G/DL (ref 6–8.4)
PROT UR QL STRIP: ABNORMAL
RBC # BLD AUTO: 2.82 M/UL (ref 4–5.4)
RBC #/AREA URNS HPF: 17 /HPF (ref 0–4)
SAMPLE: ABNORMAL
SITE: ABNORMAL
SODIUM BLD-SCNC: 137 MMOL/L (ref 136–145)
SODIUM SERPL-SCNC: 138 MMOL/L (ref 136–145)
SP GR UR STRIP: 1.01 (ref 1–1.03)
SP02: 92
SQUAMOUS #/AREA URNS HPF: 16 /HPF
TRIGL SERPL-MCNC: 55 MG/DL (ref 30–150)
URN SPEC COLLECT METH UR: ABNORMAL
UROBILINOGEN UR STRIP-ACNC: ABNORMAL EU/DL
WBC # BLD AUTO: 13.04 K/UL (ref 3.9–12.7)
WBC #/AREA URNS HPF: 1 /HPF (ref 0–5)

## 2020-03-16 PROCEDURE — 25000003 PHARM REV CODE 250: Performed by: FAMILY MEDICINE

## 2020-03-16 PROCEDURE — B4185 PARENTERAL SOL 10 GM LIPIDS: HCPCS | Performed by: FAMILY MEDICINE

## 2020-03-16 PROCEDURE — 84295 ASSAY OF SERUM SODIUM: CPT

## 2020-03-16 PROCEDURE — 84132 ASSAY OF SERUM POTASSIUM: CPT

## 2020-03-16 PROCEDURE — 63600175 PHARM REV CODE 636 W HCPCS: Performed by: INTERNAL MEDICINE

## 2020-03-16 PROCEDURE — C9113 INJ PANTOPRAZOLE SODIUM, VIA: HCPCS | Performed by: INTERNAL MEDICINE

## 2020-03-16 PROCEDURE — 25000003 PHARM REV CODE 250: Performed by: STUDENT IN AN ORGANIZED HEALTH CARE EDUCATION/TRAINING PROGRAM

## 2020-03-16 PROCEDURE — 94761 N-INVAS EAR/PLS OXIMETRY MLT: CPT

## 2020-03-16 PROCEDURE — 99900035 HC TECH TIME PER 15 MIN (STAT)

## 2020-03-16 PROCEDURE — 63600175 PHARM REV CODE 636 W HCPCS: Performed by: FAMILY MEDICINE

## 2020-03-16 PROCEDURE — 84478 ASSAY OF TRIGLYCERIDES: CPT

## 2020-03-16 PROCEDURE — 84134 ASSAY OF PREALBUMIN: CPT

## 2020-03-16 PROCEDURE — 85027 COMPLETE CBC AUTOMATED: CPT

## 2020-03-16 PROCEDURE — 85014 HEMATOCRIT: CPT

## 2020-03-16 PROCEDURE — 25000003 PHARM REV CODE 250: Performed by: INTERNAL MEDICINE

## 2020-03-16 PROCEDURE — 94660 CPAP INITIATION&MGMT: CPT

## 2020-03-16 PROCEDURE — 27000221 HC OXYGEN, UP TO 24 HOURS

## 2020-03-16 PROCEDURE — 82803 BLOOD GASES ANY COMBINATION: CPT

## 2020-03-16 PROCEDURE — 84100 ASSAY OF PHOSPHORUS: CPT

## 2020-03-16 PROCEDURE — A4217 STERILE WATER/SALINE, 500 ML: HCPCS | Performed by: FAMILY MEDICINE

## 2020-03-16 PROCEDURE — 82330 ASSAY OF CALCIUM: CPT

## 2020-03-16 PROCEDURE — 83735 ASSAY OF MAGNESIUM: CPT

## 2020-03-16 PROCEDURE — 21000000 HC CCU ICU ROOM CHARGE

## 2020-03-16 PROCEDURE — 63600175 PHARM REV CODE 636 W HCPCS: Performed by: STUDENT IN AN ORGANIZED HEALTH CARE EDUCATION/TRAINING PROGRAM

## 2020-03-16 PROCEDURE — 63600175 PHARM REV CODE 636 W HCPCS

## 2020-03-16 PROCEDURE — 36600 WITHDRAWAL OF ARTERIAL BLOOD: CPT

## 2020-03-16 PROCEDURE — 80053 COMPREHEN METABOLIC PANEL: CPT

## 2020-03-16 RX ORDER — FUROSEMIDE 10 MG/ML
20 INJECTION INTRAMUSCULAR; INTRAVENOUS ONCE
Status: COMPLETED | OUTPATIENT
Start: 2020-03-16 | End: 2020-03-16

## 2020-03-16 RX ORDER — DIPHENHYDRAMINE HYDROCHLORIDE 50 MG/ML
25 INJECTION INTRAMUSCULAR; INTRAVENOUS ONCE
Status: COMPLETED | OUTPATIENT
Start: 2020-03-16 | End: 2020-03-16

## 2020-03-16 RX ORDER — FUROSEMIDE 10 MG/ML
INJECTION INTRAMUSCULAR; INTRAVENOUS
Status: COMPLETED
Start: 2020-03-16 | End: 2020-03-16

## 2020-03-16 RX ORDER — PROCHLORPERAZINE EDISYLATE 5 MG/ML
10 INJECTION INTRAMUSCULAR; INTRAVENOUS ONCE
Status: COMPLETED | OUTPATIENT
Start: 2020-03-16 | End: 2020-03-16

## 2020-03-16 RX ADMIN — BUTALBITAL, ACETAMINOPHEN AND CAFFEINE 1 TABLET: 50; 325; 40 TABLET ORAL at 08:03

## 2020-03-16 RX ADMIN — DIPHENOXYLATE HYDROCHLORIDE AND ATROPINE SULFATE 1 TABLET: 2.5; .025 TABLET ORAL at 12:03

## 2020-03-16 RX ADMIN — SALINE NASAL SPRAY 1 SPRAY: 1.5 SOLUTION NASAL at 12:03

## 2020-03-16 RX ADMIN — SMOFLIPID: 6; 6; 5; 3 INJECTION, EMULSION INTRAVENOUS at 04:03

## 2020-03-16 RX ADMIN — EPOETIN ALFA-EPBX 6400 UNITS: 10000 INJECTION, SOLUTION INTRAVENOUS; SUBCUTANEOUS at 10:03

## 2020-03-16 RX ADMIN — FUROSEMIDE 20 MG: 10 INJECTION INTRAMUSCULAR; INTRAVENOUS at 07:03

## 2020-03-16 RX ADMIN — DIPHENHYDRAMINE HYDROCHLORIDE 6.25 MG: 50 INJECTION INTRAMUSCULAR; INTRAVENOUS at 05:03

## 2020-03-16 RX ADMIN — FUROSEMIDE 20 MG: 10 INJECTION, SOLUTION INTRAMUSCULAR; INTRAVENOUS at 01:03

## 2020-03-16 RX ADMIN — PANTOPRAZOLE SODIUM 40 MG: 40 INJECTION, POWDER, LYOPHILIZED, FOR SOLUTION INTRAVENOUS at 09:03

## 2020-03-16 RX ADMIN — HYOSCYAMINE SULFATE 0.12 MG: 0.12 TABLET ORAL at 10:03

## 2020-03-16 RX ADMIN — FUROSEMIDE 20 MG: 10 INJECTION, SOLUTION INTRAMUSCULAR; INTRAVENOUS at 07:03

## 2020-03-16 RX ADMIN — PROCHLORPERAZINE EDISYLATE 10 MG: 5 INJECTION INTRAMUSCULAR; INTRAVENOUS at 10:03

## 2020-03-16 RX ADMIN — MAGNESIUM OXIDE 400 MG: 400 TABLET ORAL at 10:03

## 2020-03-16 RX ADMIN — DIPHENHYDRAMINE HYDROCHLORIDE 6.25 MG: 50 INJECTION INTRAMUSCULAR; INTRAVENOUS at 04:03

## 2020-03-16 RX ADMIN — SERTRALINE HYDROCHLORIDE 50 MG: 50 TABLET ORAL at 09:03

## 2020-03-16 RX ADMIN — BUTALBITAL, ACETAMINOPHEN AND CAFFEINE 1 TABLET: 50; 325; 40 TABLET ORAL at 10:03

## 2020-03-16 RX ADMIN — LEVOTHYROXINE SODIUM 88 MCG: 88 TABLET ORAL at 05:03

## 2020-03-16 RX ADMIN — DIPHENOXYLATE HYDROCHLORIDE AND ATROPINE SULFATE 1 TABLET: 2.5; .025 TABLET ORAL at 10:03

## 2020-03-16 RX ADMIN — LOPERAMIDE HYDROCHLORIDE 4 MG: 2 CAPSULE ORAL at 10:03

## 2020-03-16 RX ADMIN — DIPHENHYDRAMINE HYDROCHLORIDE 25 MG: 50 INJECTION INTRAMUSCULAR; INTRAVENOUS at 10:03

## 2020-03-16 RX ADMIN — LOPERAMIDE HYDROCHLORIDE 4 MG: 2 CAPSULE ORAL at 09:03

## 2020-03-16 RX ADMIN — CEFTRIAXONE SODIUM 1 G: 1 INJECTION, POWDER, FOR SOLUTION INTRAMUSCULAR; INTRAVENOUS at 01:03

## 2020-03-16 RX ADMIN — LOPERAMIDE HYDROCHLORIDE 4 MG: 2 CAPSULE ORAL at 12:03

## 2020-03-16 RX ADMIN — DRONABINOL 2.5 MG: 2.5 CAPSULE ORAL at 05:03

## 2020-03-16 RX ADMIN — PANTOPRAZOLE SODIUM 40 MG: 40 INJECTION, POWDER, LYOPHILIZED, FOR SOLUTION INTRAVENOUS at 10:03

## 2020-03-16 RX ADMIN — DIPHENOXYLATE HYDROCHLORIDE AND ATROPINE SULFATE 1 TABLET: 2.5; .025 TABLET ORAL at 09:03

## 2020-03-16 RX ADMIN — MAGNESIUM OXIDE 400 MG: 400 TABLET ORAL at 09:03

## 2020-03-16 RX ADMIN — PSYLLIUM HUSK 2 PACKET: 3.4 POWDER ORAL at 09:03

## 2020-03-16 RX ADMIN — HYDROCODONE BITARTRATE AND ACETAMINOPHEN 1 TABLET: 5; 325 TABLET ORAL at 05:03

## 2020-03-16 RX ADMIN — ACETAMINOPHEN 650 MG: 325 TABLET ORAL at 02:03

## 2020-03-16 RX ADMIN — SODIUM CHLORIDE, SODIUM LACTATE, POTASSIUM CHLORIDE, AND CALCIUM CHLORIDE: .6; .31; .03; .02 INJECTION, SOLUTION INTRAVENOUS at 05:03

## 2020-03-16 RX ADMIN — DIPHENHYDRAMINE HYDROCHLORIDE 6.25 MG: 50 INJECTION INTRAMUSCULAR; INTRAVENOUS at 12:03

## 2020-03-16 NOTE — NURSING
Lee catheter flushed; clots dislodged. Light hematuria flowing to lee bag. Suction canister replaced.

## 2020-03-16 NOTE — PROGRESS NOTES
03/16/20 1000        Ileostomy 02/13/20 1600 Loop RLQ   Placement Date/Time: 02/13/20 (c) 1600   Present Prior to Hospital Arrival?: No  Inserted by: MD  Ileostomy Type: Loop  Location: RLQ  Initial Stoma Size (in): (c)    Stoma Size (in) 43   Appliance 2-piece   Accessories/Skin Care   (irritated, cleaned dried and used skin prep no sting, moldable ring)   Treatment Bag change   Stoma Function stool  (hooked to suction)

## 2020-03-16 NOTE — NURSING
Catheter flushed per pt request; small clots flowed out into lee bag. Reports decreased pain after medication.

## 2020-03-16 NOTE — NURSING
Pt complaining of increased SOB. MD was notified and orders rec. Pt also complaining of bladder distention. Catheter irrigated per Md order. Will continue to monitor pt. resp and urinary output.

## 2020-03-16 NOTE — PROGRESS NOTES
[facility]  Adult Nutrition   Progress Note (Nutrition Support Management)    SUMMARY     Recommendations  Recommendation/Intervention: 1. New TPN ordered to be initiated @ 1700. 2. Recommend NPO status if ostomy output does not improve. Continued high output may worsen renal function.   Goals: 1. Patient to tolerate TPN and TPN provision to meet needs.   Nutrition Goal Status: progressing towards goal  Communication of RD Recs: reviewed with RN    Dietitian Rounds Brief  TPN to continue. New TPN ordered.      PARENTERAL NUTRITION PROGRESS NOTE:    Parenteral Nutrition Day # 16  Diagnosis/Indication for PN: high ostomy output, severe malnutrition   IV Access: PICC  Diet/Tube Feeding: bland diet          Admixture Type: 3-in-1 TPN, custom (15% AA, 70% dextrose, 20% Smoflipid)   Infusion Rate and Frequency: 50 ml/hr   Patient Acuity: Acute care     PN Composition:   51 grams Amino Acid, 301 grams Dextrose, and 60 grams Lipid.    Today's TPN provides 1827 kcal.     Please see order for electrolytes and additives content and adjustments.   *The Nutrition Support Team will continue to monitor electrolytes daily and adjust parenteral nutrition as warranted.     Reason for Assessment  Reason For Assessment: new TPN, RD follow-up  Interdisciplinary Rounds: attended    Nutrition Risk Screen  Nutrition Risk Screen: tube feeding or parenteral nutrition       Ileostomy 02/13/20 1600 Loop RLQ-Wound Image: Images linked       Pressure Injury 02/27/20 1837 Left Buttocks Stage 2-Wound Image: Images linked  MST Score: 0  Have you recently lost weight without trying?: No(not since coming to hospital)  Weight loss score: 0  Have you been eating poorly because of a decreased appetite?: No  Appetite score: 0       Nutrition/Diet History  Spiritual, Cultural Beliefs, Latter day Practices, Values that Affect Care: yes(pt states she is [employer]ian, she believes in God)  Food Allergies: NKFA  Factors Affecting Nutritional  "Intake: malabsorption, altered gastrointestinal function  Nutrition Support Formula Prior to Admit: Other (see comments)(Custom TPN )    Anthropometrics  Temp: 98.5 °F (36.9 °C)  Height Method: Stated  Height: 5' 7.5" (171.5 cm)  Height (inches): 67.5 in  Weight Method: Bed Scale  Weight: 63.5 kg (139 lb 15.9 oz)  Weight (lb): 139.99 lb  Ideal Body Weight (IBW), Female: 137.5 lb  % Ideal Body Weight, Female (lb): 101.81 %  BMI (Calculated): 21.6  BMI Grade: 18.5-24.9 - normal       Weight History:  Wt Readings from Last 10 Encounters:   03/08/20 63.5 kg (139 lb 15.9 oz)   03/06/20 53 kg (116 lb 13.5 oz)   03/07/20 52.6 kg (116 lb)   03/03/20 53 kg (116 lb 13.5 oz)   02/19/20 58.7 kg (129 lb 4.8 oz)   02/19/20 58.8 kg (129 lb 9.6 oz)   02/14/20 56.7 kg (125 lb)   02/05/20 56.9 kg (125 lb 6.4 oz)   01/22/20 57.2 kg (126 lb)   01/08/20 61.4 kg (135 lb 6.4 oz)       Lab/Procedures/Meds: Pertinent Labs Reviewed  Clinical Chemistry:  Recent Labs   Lab 03/14/20  0504 03/15/20  0424 03/16/20  0525    137 138   K 3.9 4.0 4.0    104 107   CO2 25 25 23    111* 122*   BUN 37* 41* 39*   CREATININE 1.7* 2.0* 1.8*   CALCIUM 8.0* 7.8* 7.9*   PROT 4.7* 4.9* 5.3*   ALBUMIN 2.1* 2.2* 2.3*   BILITOT 1.1* 1.1* 1.8*   ALKPHOS 197* 208* 191*   AST 19 20 22   ALT 12 12 13   ANIONGAP 5* 8 8   ESTGFRAFRICA 35.0* 28.7* 32.6*   EGFRNONAA 30.3* 24.9* 28.3*   MG 2.2 2.3 2.3   PHOS 4.4 4.5 4.1     CBC:   Recent Labs   Lab 03/16/20  0525   WBC 13.04*   RBC 2.82*   HGB 8.6*   HCT 27.2*      MCV 97   MCH 30.5   MCHC 31.6*     Lipid Panel:  Recent Labs   Lab 03/16/20  0525   TRIG 55     Thyroid & Parathyroid:  Recent Labs   Lab 03/14/20  0504   TSH 3.770   FREET4 0.84     Medications: Pertinent Medications reviewed  Scheduled Meds:   cefTRIAXone (ROCEPHIN) IVPB  1 g Intravenous Q24H    diphenhydrAMINE  6.25 mg Intravenous Q6H    diphenoxylate-atropine 2.5-0.025 mg  1 tablet Oral QID    dronabinoL  2.5 mg Oral BID AC    " epoetin nica-ebpx (RETACRIT) injection  100 Units/kg Subcutaneous Q7 Days    levothyroxine  88 mcg Oral Before breakfast    loperamide  4 mg Oral QID    magnesium oxide  400 mg Oral BID    pantoprazole  40 mg Intravenous BID    psyllium husk (with sugar)  2 packet Oral TID    sertraline  50 mg Oral Daily    sodium chloride  1 spray Each Nostril Daily     Continuous Infusions:   dextrose 10 % in water (D10W)      lactated ringers 75 mL/hr at 03/16/20 0559    TPN ADULT CENTRAL LINE CUSTOM (3 in 1) 65 mL/hr at 03/15/20 1712    TPN ADULT CENTRAL LINE CUSTOM (3 in 1)       PRN Meds:.sodium chloride, acetaminophen, butalbital-acetaminophen-caffeine -40 mg, calcium chloride IVPB, calcium chloride IVPB, calcium chloride IVPB, calcium chloride IVPB, calcium chloride IVPB, calcium chloride IVPB, dextrose 10 % in water (D10W), dextrose 50%, dextrose 50%, glucagon (human recombinant), glucose, glucose, HYDROcodone-acetaminophen, hyoscyamine, magnesium oxide, magnesium oxide, magnesium sulfate IVPB, magnesium sulfate IVPB, magnesium sulfate IVPB, magnesium sulfate IVPB, magnesium sulfate IVPB, magnesium sulfate IVPB, magnesium sulfate IVPB, magnesium sulfate IVPB, melatonin, ondansetron, potassium chloride in water, potassium chloride in water, potassium chloride in water, potassium chloride in water, potassium chloride in water, potassium chloride in water, potassium chloride in water, potassium chloride in water, potassium chloride, potassium chloride, potassium chloride, potassium chloride, potassium chloride, potassium chloride, potassium chloride, potassium chloride, sodium chloride 0.9%, sodium phosphate IVPB, sodium phosphate IVPB, sodium phosphate IVPB, sodium phosphate IVPB, sodium phosphate IVPB, sodium phosphate IVPB, sodium phosphate IVPB, sodium phosphate IVPB, sodium phosphate IVPB, sodium phosphate IVPB    Estimated/Assessed Needs  Weight Used For Calorie Calculations: 63.5 kg (139 lb 15.9  oz)  Energy Calorie Requirements (kcal): 1905 - 2223 (30 - 35)   Energy Need Method: Kcal/kg  Protein Requirements: 95 - 127 (1.5 - 2 g/kg)   Weight Used For Protein Calculations: 63.5 kg (139 lb 15.9 oz)     Estimated Fluid Requirement Method: RDA Method  RDA Method (mL): 1905       Nutrition Prescription Ordered  Current Diet Order: Regular Diet   Current Nutrition Support Formula Ordered: Other (Comment)(custom TPN )  Current Nutrition Support Rate Ordered: 65 (ml)  Current Nutrition Support Frequency Ordered: ml/hr     Evaluation of Received Nutrient/Fluid Intake  Parenteral Calories (kcal): 2007  Parenteral Protein (gm): 96  Parenteral Fluid (mL): 1560  Energy Calories Required: meeting needs  Protein Required: meeting needs  Fluid Required: meeting needs  Comments: TPN to continue. New TPN ordered.   Tolerance: tolerating     Intake/Output Summary (Last 24 hours) at 3/16/2020 1447  Last data filed at 3/16/2020 1400  Gross per 24 hour   Intake 3203 ml   Output 5600 ml   Net -2397 ml      % Intake of Estimated Energy Needs: 50 - 75 %  % Meal Intake: 50 - 75 %    Nutrition Risk  Level of Risk/Frequency of Follow-up: high     Monitor and Evaluation  Food and Nutrient Intake: food and beverage intake, energy intake, parenteral nutrition intake  Food and Nutrient Adminstration: enteral and parenteral nutrition administration, diet order  Physical Activity and Function: nutrition-related ADLs and IADLs, factors affecting access to physical activity  Anthropometric Measurements: weight change, weight, body mass index  Biochemical Data, Medical Tests and Procedures: gastrointestinal profile, electrolyte and renal panel, inflammatory profile, lipid profile, glucose/endocrine profile  Nutrition-Focused Physical Findings: overall appearance     Nutrition Follow-Up  RD Follow-up?: Yes    Sandra Lovell RD 03/16/2020 2:47 PM

## 2020-03-16 NOTE — PT/OT/SLP PROGRESS
Physical Therapy      Patient Name:  Shara Hutchins   MRN:  [id number]    Patient not seen today secondary to Other (Comment), Patient fatigue(Pt decliend tx for the day 2/2 Pt with increased SOB and weakness. RN aware.). Will follow-up tomorrow .    Hillary Hammant, PTA

## 2020-03-16 NOTE — PT/OT/SLP PROGRESS
Occupational Therapy      Patient Name:  Shara Hutchins   MRN:  [id number]    Patient not seen today secondary to Patient ill (Comment)(pt declined OT 2/2 pt c/o feeling very SOB, not feeling well.  nurse aware.). Will follow-up 3/17/20.    Lay Hillman, OT  3/16/2020

## 2020-03-16 NOTE — CARE UPDATE
03/16/20 1601   Patient Assessment/Suction   Level of Consciousness (AVPU) alert   Respiratory Effort Normal;Unlabored   Expansion/Accessory Muscles/Retractions no use of accessory muscles   All Lung Fields Breath Sounds crackles, coarse   Rhythm/Pattern, Respiratory assisted mechanically   PRE-TX-O2   O2 Device (Oxygen Therapy) BiPAP   $ Is the patient on Low Flow Oxygen? Yes   SpO2 96 %   Pulse Oximetry Type Intermittent   $ Pulse Oximetry - Multiple Charge Pulse Oximetry - Multiple   Pulse 76   Resp (!) 22   Preset CPAP/BiPAP Settings   Mode Of Delivery BiPAP   $ CPAP/BiPAP Daily Charge BiPAP/CPAP Daily   $ Initial CPAP/BiPAP Setup? Yes   $ Is patient using? Yes   Size of Mask Small   Sized Appropriately? Yes   Equipment Type V60   Airway Device Type small full face mask   Humidifier not applicable   Ipap 12   EPAP (cm H2O) 6   Pressure Support (cm H2O) 6   Set Rate (Breaths/Min) 12   ITime (sec) 0.9   Rise Time (sec) 3   Patient CPAP/BiPAP Settings   Timed Inspiration (Sec) 0.9   RR Total (Breaths/Min) 25   Tidal Volume (mL) 895   VE Minute Ventilation (L/min) 20 L/min   Peak Inspiratory Pressure (cm H2O) 13   TiTOT (%) 31   Total Leak (L/Min) 21   Patient Trigger - ST Mode Only (%) 96   CPAP/BiPAP Backup Settings   FIO2 Backup 0.4 %   CPAP/BiPAP Alarms   High Pressure (cm H2O) 40   Low Pressure (cm H2O) 8   High RR (breaths/min) 40   Low RR (breaths/min) 10   Apnea (Sec) 20   Labs   $ Was an ABG obtained? Arterial Puncture;ISTAT - Blood gas;ISTAT - Calcium;ISTAT - Hematocrit;ISTAT - PH, Blood;ISTAT - Potassium   $ Labs Tech Time 15 min   Critical Value Communication   Date Result Received 03/16/20   Time Result Received 1604   Resulting Department of Critical Value Resp   Who communicated critical value from resulting department? Iris Ashraf, RRT   Critical Test #1 PAO2   Critical Test #1 Result 58   Name of Notified Physician/Designee MAN Wang RN   Date Notified 03/16/20   Time Notified 1604   Read  Back Verification Yes   Physician Directive BIPAP

## 2020-03-16 NOTE — SUBJECTIVE & OBJECTIVE
Interval History:  Patient reports feeling okay.  Has increased shortness of breath.  Requiring 2 L nasal cannula saturating 94%.  Continues to have pitting edema and upper and lower extremities.  Suspect 3rd spacing.  DVT negative on ultrasound.  Stool is more formed today.  Continues to be off octreotide.  Transfuse 1 PRBC yesterday.  Started on EPO per Nephrology.  IV Lasix ordered.  Has good urine output.  Afebrile the past 24 hr.  Urine culture grew gram-negative estelle greater than 100,000.  Rocephin started.  Awaiting for LTAC placement.    Review of Systems   Constitutional: Negative for chills, fatigue, fever and unexpected weight change.   HENT: Negative for sore throat.    Eyes: Negative for visual disturbance.   Respiratory: Negative for cough, chest tightness and shortness of breath.    Cardiovascular: Negative for chest pain.   Gastrointestinal: Negative for abdominal pain, constipation, diarrhea, nausea and vomiting.   Endocrine: Negative for polyuria.   Genitourinary: Negative for dysuria and hematuria.   Neurological: Negative for headaches.     Objective:     Vital Signs (Most Recent):  Temp: 98.5 °F (36.9 °C) (03/16/20 1100)  Pulse: 86 (03/16/20 1100)  Resp: 20 (03/16/20 1100)  BP: (!) 148/73 (03/16/20 1100)  SpO2: (!) 91 % (03/16/20 1100) Vital Signs (24h Range):  Temp:  [98 °F (36.7 °C)-100.2 °F (37.9 °C)] 98.5 °F (36.9 °C)  Pulse:  [84-92] 86  Resp:  [16-20] 20  SpO2:  [91 %-94 %] 91 %  BP: (133-162)/(62-81) 148/73     Weight: 63.5 kg (139 lb 15.9 oz)  Body mass index is 21.6 kg/m².    Intake/Output Summary (Last 24 hours) at 3/16/2020 1350  Last data filed at 3/16/2020 1100  Gross per 24 hour   Intake 3203 ml   Output 6500 ml   Net -3297 ml      Physical Exam   Constitutional: She is oriented to person, place, and time. No distress.   HENT:   Head: Normocephalic and atraumatic.   Right Ear: External ear normal.   Left Ear: External ear normal.   Eyes: Conjunctivae and EOM are normal. Right eye  exhibits no discharge. Left eye exhibits no discharge.   Neck: Normal range of motion.   Cardiovascular: Normal rate and regular rhythm.   No murmur heard.  +2 pitting edema in BL LE, LUE   Pulmonary/Chest: Effort normal and breath sounds normal. No respiratory distress.   S/p port   Abdominal: Soft. She exhibits no distension. There is no tenderness.   S/p ostomy   Genitourinary:   Genitourinary Comments: lee   Musculoskeletal: Normal range of motion.   Neurological: She is alert and oriented to person, place, and time.   Skin: She is not diaphoretic.   Psychiatric: She has a normal mood and affect. Her behavior is normal. Judgment and thought content normal.       Significant Labs:   CBC:   Recent Labs   Lab 03/15/20  0424 03/15/20  0840 03/16/20  0525   WBC 10.30  --  13.04*   HGB 7.3* 6.9* 8.6*   HCT 22.8* 22.1* 27.2*     --  182     CMP:   Recent Labs   Lab 03/15/20  0424 03/16/20  0525    138   K 4.0 4.0    107   CO2 25 23   * 122*   BUN 41* 39*   CREATININE 2.0* 1.8*   CALCIUM 7.8* 7.9*   PROT 4.9* 5.3*   ALBUMIN 2.2* 2.3*   BILITOT 1.1* 1.8*   ALKPHOS 208* 191*   AST 20 22   ALT 12 13   ANIONGAP 8 8   EGFRNONAA 24.9* 28.3*     Urine Culture:   Recent Labs   Lab 03/15/20  2350   LABURIN GRAM NEGATIVE CRISTHIAN, LACTOSE   >100,000 cfu/ml  Identification and susceptibility pending  *       Significant Imaging:     US Upper Extremity Veins Left [[id number]] Resulted: 03/15/20 1059   Order Status: Completed Updated: 03/15/20 1101   Narrative:     EXAMINATION:  US UPPER EXTREMITY VEINS LEFT    CLINICAL HISTORY:  Left upper extremity edema.    TECHNIQUE:  Grayscale, color and spectral Doppler analysis of the left upper extremity deep venous system was performed.    COMPARISON:  None.    FINDINGS:  There is normal compressibility where possible, with normal flow by color and spectral Doppler analysis in the left upper extremity deep venous system, with normal augmentation and no  evidence of deep venous thrombosis.  There is a left upper extremity graft or fistula which is patent.   Impression:       Negative for left upper extremity deep venous thrombosis.

## 2020-03-16 NOTE — PHYSICIAN QUERY
PT Name: Shara Hutchins  MR #: [id number]     Physician Query Form - Documentation Clarification      CDS/: Marti Busch               Contact information:[phone] or through Secure Epic Chat Messenger    This form is a permanent document in the medical record.     Query Date: March 16, 2020    By submitting this query, we are merely seeking further clarification of documentation. Please utilize your independent clinical judgment when addressing the question(s) below.    The Medical record reflects the following:  3/16 Urine culture grew gram-negative estelle greater than 100,000. Rocephin started  3/17 Growing Klebsiella Pneumoniae.                                                                            Doctor, Please specify diagnosis or diagnoses associated with above clinical findings if applicable.    Provider Use Only  uti                                                                                                                                [  ] Clinically Undetermined

## 2020-03-16 NOTE — PLAN OF CARE
Problem: Parenteral Nutrition  Goal: Effective Intravenous Nutrition Therapy Delivery  Outcome: Ongoing, Progressing  Intervention: Optimize Intravenous Nutrition Delivery  Flowsheets (Taken 3/16/2020 5432)  Nutrition Support Management: parenteral nutrition composition adjusted

## 2020-03-16 NOTE — NURSING
Hospitalist notified of pt c/o burning sensation to lee site. Catheter flushed. Noted clots, hematuria present. Pt verbalized improved comfort.

## 2020-03-16 NOTE — ASSESSMENT & PLAN NOTE
Continue TPN until ostomy output decreased  Trial of discontinue octreotide drip  Discussing with sw for octreotide IM monthly  On TPN for nutrition support with suspected short gut syndrome  Will need reversal of ostomy with General surgery in several weeks  Awaiting for LTAC placement  PT/OT  Unable to give chemical DVT prophylaxis due to thrombocytopenia  S/p 1 PRBC. On epo  UCx G- estelle. Started on rocephin

## 2020-03-16 NOTE — PLAN OF CARE
03/16/20 1537   Post-Acute Status   Post-Acute Authorization Placement   Post-Acute Placement Status Referrals Sent       [facility], [employer] could not give a favorable contract.  CM had discussion with patient, she understands that I will have to send referrals to the [place]. Packet sent via right fax to [facility]([phone]) and [facility]([phone])  H[facility] to follow

## 2020-03-16 NOTE — CONSULTS
INPATIENT NEPHROLOGY CONSULT   [facility] NEPHROLOGY    Patient Name: Shara Hutchins  Date: 03/16/2020    Reason for consultation: DARCI/CKD    Chief Complaint: Diarrhea    History of Present Illness:  70 y/o F with hx of HTN, NIDDM-2, CKD-3, vulvar cancer (s/p chemo/radiation), and now s/p ileostomy after pneumatosis intestinalis earlier in 2020 and with persistent diarrhea 2/2 chemotherapy who p/w high ostomy output, treated with TPN/octreotide, awaiting LTAC, developed gross hematuria 3/13, noted to have bump in Cr and Hb drop, consulted for DARCI and anemia.    Plan of Care:    1. DARCI/CKD III  - sCr seem better  - monitor UOP  - no nsaids or IV contrast    2. Chronic volume depletion 2/2 diarrhea  - monitor ostomy output  - on LR + TPN  - on multiple stool bulking agents, octreotide; surgery in future    3. Gross hematuria  - clearing in lee    4. Acute on chronic anemia of CKD  - transfuse 1 unit of blood on 3/15  - ADAM weekly 100 units/kg SQ.    5. Edema  - ok with lasix PRN    Thank you for allowing us to participate in this patient's care. We will continue to follow.    Vital Signs:  Temp Readings from Last 3 Encounters:   03/16/20 98.7 °F (37.1 °C) (Oral)   03/07/20 98.2 °F (36.8 °C) (Oral)   03/05/20 97.8 °F (36.6 °C) (Oral)       Pulse Readings from Last 3 Encounters:   03/16/20 87   03/07/20 (!) 57   03/05/20 61       BP Readings from Last 3 Encounters:   03/16/20 (!) 158/81   03/07/20 131/61   03/05/20 (!) 169/74       Weight:  Wt Readings from Last 3 Encounters:   03/08/20 63.5 kg (139 lb 15.9 oz)   03/06/20 53 kg (116 lb 13.5 oz)   03/07/20 52.6 kg (116 lb)       Past Medical & Surgical History:  Past Medical History:   Diagnosis Date    Chronic kidney disease, stage III (moderate) 1/7/2018    Diabetes mellitus, type 2     Fatigue     Hypertension     Iron deficiency anemia due to chronic blood loss 1/7/2018    Vulvar cancer 1/7/2019       Past Surgical History:   Procedure Laterality Date     ABDOMINAL SURGERY      BACK SURGERY      carpel tunnel surgery once on each hand      CERVICAL FUSION      x 4    cervix repair      CHOLECYSTECTOMY      DIAGNOSTIC LAPAROSCOPY N/A 2020    Procedure: LAPAROSCOPY, DIAGNOSTIC;  Surgeon: Robbi Lovell III, MD;  Location: [facility] OR;  Service: General;  Laterality: N/A;    HYSTERECTOMY  1980    ILEOSTOMY N/A 2020    Procedure: CREATION, ILEOSTOMY Loop;  Surgeon: Robbi Lovell III, MD;  Location: [facility] OR;  Service: General;  Laterality: N/A;    LUMBAR LAMINECTOMY      LYSIS OF ADHESIONS N/A 2020    Procedure: LYSIS, ADHESIONS;  Surgeon: Robbi Lovell III, MD;  Location: [facility] OR;  Service: General;  Laterality: N/A;    NECK SURGERY      PHLEBOGRAPHY  2020    Procedure: VENOGRAM;  Surgeon: Robbi Lovell III, MD;  Location: [facility];  Service: General;;    REMOVAL OF VASCULAR ACCESS PORT Right 2020    Procedure: REMOVAL, VASCULAR ACCESS PORT;  Surgeon: Robbi Lovell III, MD;  Location: [facility];  Service: General;  Laterality: Right;    SHOULDER SURGERY      vulva cancer         Past Social History:  Social History     Socioeconomic History    Marital status:      Spouse name: Not on file    Number of children: Not on file    Years of education: Not on file    Highest education level: Not on file   Occupational History    Not on file   Social Needs    Financial resource strain: Not on file    Food insecurity:     Worry: Not on file     Inability: Not on file    Transportation needs:     Medical: Not on file     Non-medical: Not on file   Tobacco Use    Smoking status: Former Smoker     Packs/day: 1.00     Years: 33.00     Pack years: 33.00     Types: Cigarettes     Last attempt to quit: 2000     Years since quittin.6    Smokeless tobacco: Never Used   Substance and Sexual Activity    Alcohol use: No    Drug use: No    Sexual activity: Not on file   Lifestyle    Physical activity:      Days per week: Not on file     Minutes per session: Not on file    Stress: Not on file   Relationships    Social connections:     Talks on phone: Not on file     Gets together: Not on file     Attends Yarsanism service: Not on file     Active member of club or organization: Not on file     Attends meetings of clubs or organizations: Not on file     Relationship status: Not on file   Other Topics Concern    Not on file   Social History Narrative    Not on file       Medications:  Scheduled Meds:   diphenhydrAMINE  6.25 mg Intravenous Q6H    diphenoxylate-atropine 2.5-0.025 mg  1 tablet Oral QID    dronabinoL  2.5 mg Oral BID AC    epoetin nica-ebpx (RETACRIT) injection  100 Units/kg Subcutaneous Q7 Days    levothyroxine  88 mcg Oral Before breakfast    loperamide  4 mg Oral QID    magnesium oxide  400 mg Oral BID    pantoprazole  40 mg Intravenous BID    psyllium husk (with sugar)  2 packet Oral TID    sertraline  50 mg Oral Daily    sodium chloride  1 spray Each Nostril Daily     Continuous Infusions:   dextrose 10 % in water (D10W)      lactated ringers 75 mL/hr at 03/16/20 0559    TPN ADULT CENTRAL LINE CUSTOM (3 in 1) 65 mL/hr at 03/15/20 1712     PRN Meds:.sodium chloride, acetaminophen, butalbital-acetaminophen-caffeine -40 mg, calcium chloride IVPB, calcium chloride IVPB, calcium chloride IVPB, calcium chloride IVPB, calcium chloride IVPB, calcium chloride IVPB, dextrose 10 % in water (D10W), dextrose 50%, dextrose 50%, glucagon (human recombinant), glucose, glucose, HYDROcodone-acetaminophen, hyoscyamine, magnesium oxide, magnesium oxide, magnesium sulfate IVPB, magnesium sulfate IVPB, magnesium sulfate IVPB, magnesium sulfate IVPB, magnesium sulfate IVPB, magnesium sulfate IVPB, magnesium sulfate IVPB, magnesium sulfate IVPB, melatonin, ondansetron, potassium chloride in water, potassium chloride in water, potassium chloride in water, potassium chloride in water, potassium  chloride in water, potassium chloride in water, potassium chloride in water, potassium chloride in water, potassium chloride, potassium chloride, potassium chloride, potassium chloride, potassium chloride, potassium chloride, potassium chloride, potassium chloride, sodium chloride 0.9%, sodium phosphate IVPB, sodium phosphate IVPB, sodium phosphate IVPB, sodium phosphate IVPB, sodium phosphate IVPB, sodium phosphate IVPB, sodium phosphate IVPB, sodium phosphate IVPB, sodium phosphate IVPB, sodium phosphate IVPB  No current facility-administered medications on file prior to encounter.      Current Outpatient Medications on File Prior to Encounter   Medication Sig Dispense Refill    acetaminophen (TYLENOL) 325 MG tablet Take 2 tablets (650 mg total) by mouth every 4 (four) hours as needed.  0    cholestyramine-aspartame (QUESTRAN LIGHT) 4 gram PwPk Take 1 packet (4 g total) by mouth 2 (two) times daily. 180 packet 3    dextrose 50% injection Inject 25 mLs (12.5 g total) into the vein as needed (between 51 - 70 mg/dL if patient cannnot take PO but has IV access.).      dextrose 50% injection Inject 50 mLs (25 g total) into the vein as needed (less than 50 mg/dL if patient cannnot take PO but has IV access.).      diphenhydrAMINE (BENADRYL) 50 mg/mL injection Inject 0.125 mLs (6.25 mg total) into the vein every 6 (six) hours.      glucose 4 GM chewable tablet Take 4 tablets (16 g total) by mouth as needed.  12    glucose 4 GM chewable tablet Take 6 tablets (24 g total) by mouth as needed.  12    HYDROcodone-acetaminophen (NORCO) 5-325 mg per tablet Take 1 tablet by mouth every 6 (six) hours as needed.  0    levothyroxine (SYNTHROID) 88 MCG tablet Take 1 tablet (88 mcg total) by mouth before breakfast. 30 tablet 11    [] loperamide (IMODIUM) 2 mg capsule Take 2 capsules (4 mg total) by mouth 4 (four) times daily as needed for Diarrhea. 28 capsule 0    loperamide (IMODIUM) 2 mg capsule Take 2 capsules (4  "mg total) by mouth 4 (four) times daily. for 10 days  0    melatonin (MELATIN) 3 mg tablet Take 3 tablets (9 mg total) by mouth nightly as needed for Insomnia.  0    ondansetron (ZOFRAN-ODT) 8 MG TbDL Take 1 tablet (8 mg total) by mouth every 8 (eight) hours as needed.      oxyCODONE-acetaminophen (PERCOCET) 5-325 mg per tablet Take 1 tablet by mouth every 4 (four) hours as needed.  0    pantoprazole (PROTONIX) 40 mg injection Inject 40 mg into the vein 2 (two) times daily. 2400 mg 11    psyllium husk, with sugar, 3.4 gram packet Take 2 packets by mouth 3 (three) times daily.      Ringer's solution,lactated (LACTATED RINGERS) infusion Inject 125 mL/hr into the vein continuous.         Allergies:  Ciprofloxacin and Pcn [penicillins]    Past Family History:  Reviewed; refer to Hospitalist Admission Note    Review of Systems:  Review of Systems - All 14 systems reviewed and negative, except as noted in HPI    Physical Exam:  BP (!) 158/81   Pulse 87   Temp 98.7 °F (37.1 °C) (Oral)   Resp 20   Ht 5' 7.5" (1.715 m)   Wt 63.5 kg (139 lb 15.9 oz)   SpO2 (!) 92%   Breastfeeding? No   BMI 21.60 kg/m²     INS/OUTS:  I/O last 3 completed shifts:  In: 3983 [P.O.:840; I.V.:660]  Out: 8400 [Urine:5225; Stool:3175]  No intake/output data recorded.    General Appearance:    NAD, AAO x 3, cooperative, appears stated age   Head:    Normocephalic, atraumatic   Eyes:    PER, EOMI, and conjunctiva/sclera clear bilaterally        Mouth:   Moist mucus membranes, no thrush or oral lesions, normal      dentition   Back:     No CVA tenderness   Lungs:     Clear to auscultation bilaterally, no wheeze, crackles, rales      or rhonchi, symmetric air movement, respirations unlabored   Heart:    Regular rate and rhythm, S1 and S2 normal, no murmur, rub   or gallop   Abdomen:     Soft, non-tender, non-distended, bowel sounds active all four   quadrants, no RT or guarding, no masses, no organomegaly   Extremities:   Warm and well " perfused, distal pulses intact, no cyanosis or    peripheral edema   MSK:   No joint or muscle swelling, tenderness or deformity   Skin:   Skin color, texture, turgor normal, no rashes or lesions   Neurologic/Psychiatric:   CNII-XII intact, normal strength and sensation       throughout, no asterixis; normal affect, memory, judgement     and insight     Results:  Lab Results   Component Value Date     03/16/2020    K 4.0 03/16/2020     03/16/2020    CO2 23 03/16/2020    BUN 39 (H) 03/16/2020    CREATININE 1.8 (H) 03/16/2020    CALCIUM 7.9 (L) 03/16/2020    ANIONGAP 8 03/16/2020    ESTGFRAFRICA 32.6 (A) 03/16/2020    EGFRNONAA 28.3 (A) 03/16/2020       Lab Results   Component Value Date    CALCIUM 7.9 (L) 03/16/2020    PHOS 4.1 03/16/2020       Recent Labs   Lab 03/16/20  0525   WBC 13.04*   RBC 2.82*   HGB 8.6*   HCT 27.2*      MCV 97   MCH 30.5   MCHC 31.6*       I have personally reviewed pertinent radiological imaging and reports.    Jorge Webb MD  [facility] [address] [address]  [phone] (p)  [phone] (f)

## 2020-03-16 NOTE — PLAN OF CARE
03/16/20 0800   Patient Assessment/Suction   Level of Consciousness (AVPU) alert   Respiratory Effort Normal;Unlabored   Expansion/Accessory Muscles/Retractions no use of accessory muscles   All Lung Fields Breath Sounds crackles, coarse   Rhythm/Pattern, Respiratory shortness of breath   PRE-TX-O2   O2 Device (Oxygen Therapy) nasal cannula   Flow (L/min) 4   SpO2 (!) 92 %   Pulse 87   Resp 20   Equipment Change   $ RT Equipment humidifier   Respiratory Evaluation   $ Care Plan Tech Time 15 min

## 2020-03-16 NOTE — PROGRESS NOTES
[facility]  Progress Note    Patient Name: Shara Hutchins  MRN: [id number]  Patient Class: IP- Inpatient   Admission Date: 3/7/2020  Length of Stay: 7 days  Attending Physician: Grant Ravi MD  Primary Care Provider: April Horton MD        Subjective:     Principal Problem:Chronic diarrhea        HPI:  HTN, NIDDM-2, CKD-3, vulvar cancer (s/p chemo/radiation), and now s/p ileostomy after pneumatosis intestinalis earlier in 2020 and with persistent diarrhea 2/2 chemotherapy, who is transferred from [facility] for IR placement of CVC. Upon admission to [facility], she was having high output via her ileostomy, and subsequent DARCI and dehydration. She was placed on TPN, but there was difficulty getting IV access, and thus was receiving the TPN via a right femoral CVC, which was limiting therapy participation. As a tunneled line was unable to be obtained, she was transferred for IR services at [facility].      Procedure(s) (LRB):  Insertion, Picc, Age 5 Years Or Older (Right    3/8: Ms Hutchins will require placement for long term TPN/octreatide for 60 days    3/9: Pt was told that she would be evaluated by a local entity for the remainder of her treatment. She has no other complaints    Overview/Hospital Course:  Patient was admitted for severe dehydration and malnutrition with 20 lb weight loss at home due to high output from the ileostomy.  Patient was started on TPN and IV fluids.  Patient continued to have high output to the ileostomy with multiple anti-diarrheal agents.  Patient was started on octreotide with improved diarrhea.  Patient's symptoms improved during hospitalization.  Patient was tolerating diet and had improved nutrition.  Octreotide was discontinued however patient started having watery diarrhea and increased output.  Discussed with surgery who has concerns for short gut syndrome.  Plan for reversal of ileostomy in several weeks.  Adjusted patient's  antidiarrhea medication with improved output from the ostomy.  Discontinue octreotide.  Clinical course complicated by recurrent DARCI and third-spacing causing pitting edema.      Interval History:  Patient reports feeling okay.  Has increased shortness of breath.  Requiring 2 L nasal cannula saturating 94%.  Continues to have pitting edema and upper and lower extremities.  Suspect 3rd spacing.  DVT negative on ultrasound.  Stool is more formed today.  Continues to be off octreotide.  Transfuse 1 PRBC yesterday.  Started on EPO per Nephrology.  IV Lasix ordered.  Has good urine output.  Afebrile the past 24 hr.  Urine culture grew gram-negative estelle greater than 100,000.  Rocephin started.  Awaiting for LTAC placement.    Review of Systems   Constitutional: Negative for chills, fatigue, fever and unexpected weight change.   HENT: Negative for sore throat.    Eyes: Negative for visual disturbance.   Respiratory: Negative for cough, chest tightness and shortness of breath.    Cardiovascular: Negative for chest pain.   Gastrointestinal: Negative for abdominal pain, constipation, diarrhea, nausea and vomiting.   Endocrine: Negative for polyuria.   Genitourinary: Negative for dysuria and hematuria.   Neurological: Negative for headaches.     Objective:     Vital Signs (Most Recent):  Temp: 98.5 °F (36.9 °C) (03/16/20 1100)  Pulse: 86 (03/16/20 1100)  Resp: 20 (03/16/20 1100)  BP: (!) 148/73 (03/16/20 1100)  SpO2: (!) 91 % (03/16/20 1100) Vital Signs (24h Range):  Temp:  [98 °F (36.7 °C)-100.2 °F (37.9 °C)] 98.5 °F (36.9 °C)  Pulse:  [84-92] 86  Resp:  [16-20] 20  SpO2:  [91 %-94 %] 91 %  BP: (133-162)/(62-81) 148/73     Weight: 63.5 kg (139 lb 15.9 oz)  Body mass index is 21.6 kg/m².    Intake/Output Summary (Last 24 hours) at 3/16/2020 1350  Last data filed at 3/16/2020 1100  Gross per 24 hour   Intake 3203 ml   Output 6500 ml   Net -3297 ml      Physical Exam   Constitutional: She is oriented to person, place, and time.  No distress.   HENT:   Head: Normocephalic and atraumatic.   Right Ear: External ear normal.   Left Ear: External ear normal.   Eyes: Conjunctivae and EOM are normal. Right eye exhibits no discharge. Left eye exhibits no discharge.   Neck: Normal range of motion.   Cardiovascular: Normal rate and regular rhythm.   No murmur heard.  +2 pitting edema in BL LE, LUE   Pulmonary/Chest: Effort normal and breath sounds normal. No respiratory distress.   S/p port   Abdominal: Soft. She exhibits no distension. There is no tenderness.   S/p ostomy   Genitourinary:   Genitourinary Comments: lee   Musculoskeletal: Normal range of motion.   Neurological: She is alert and oriented to person, place, and time.   Skin: She is not diaphoretic.   Psychiatric: She has a normal mood and affect. Her behavior is normal. Judgment and thought content normal.       Significant Labs:   CBC:   Recent Labs   Lab 03/15/20  0424 03/15/20  0840 03/16/20  0525   WBC 10.30  --  13.04*   HGB 7.3* 6.9* 8.6*   HCT 22.8* 22.1* 27.2*     --  182     CMP:   Recent Labs   Lab 03/15/20  0424 03/16/20  0525    138   K 4.0 4.0    107   CO2 25 23   * 122*   BUN 41* 39*   CREATININE 2.0* 1.8*   CALCIUM 7.8* 7.9*   PROT 4.9* 5.3*   ALBUMIN 2.2* 2.3*   BILITOT 1.1* 1.8*   ALKPHOS 208* 191*   AST 20 22   ALT 12 13   ANIONGAP 8 8   EGFRNONAA 24.9* 28.3*     Urine Culture:   Recent Labs   Lab 03/15/20  2350   LABURIN GRAM NEGATIVE CRISTHIAN, LACTOSE   >100,000 cfu/ml  Identification and susceptibility pending  *       Significant Imaging:     US Upper Extremity Veins Left [[id number]] Resulted: 03/15/20 1059   Order Status: Completed Updated: 03/15/20 1101   Narrative:     EXAMINATION:  US UPPER EXTREMITY VEINS LEFT    CLINICAL HISTORY:  Left upper extremity edema.    TECHNIQUE:  Grayscale, color and spectral Doppler analysis of the left upper extremity deep venous system was performed.    COMPARISON:  None.    FINDINGS:  There is  normal compressibility where possible, with normal flow by color and spectral Doppler analysis in the left upper extremity deep venous system, with normal augmentation and no evidence of deep venous thrombosis.  There is a left upper extremity graft or fistula which is patent.   Impression:       Negative for left upper extremity deep venous thrombosis.           Assessment/Plan:      Active Hospital Problems    Diagnosis    *Chronic diarrhea    Malnutrition    Acute renal failure superimposed on stage 5 chronic kidney disease, not on chronic dialysis    Thrombocytopenia, unspecified    Debility    Unstageable pressure injury of skin and tissue    DNR (do not resuscitate)    S/P ileostomy    Hypothyroidism    Anemia of chronic disease    Hisotry of Vulvar cancer    Iron deficiency anemia due to chronic blood loss    Inflammatory bowel disease       Malnutrition  Continue TPN until ostomy output decreased  Trial of discontinue octreotide drip  Discussing with sw for octreotide IM monthly  On TPN for nutrition support with suspected short gut syndrome  Will need reversal of ostomy with General surgery in several weeks  Awaiting for LTAC placement  PT/OT  Unable to give chemical DVT prophylaxis due to thrombocytopenia  S/p 1 PRBC. On epo  UCx G- estelle. Started on rocephin    Unstageable pressure injury of skin and tissue  Wound care ordered      Iron deficiency anemia due to chronic blood loss  Transfuse 1 unit of prbc's      VTE Risk Mitigation (From admission, onward)         Ordered     Place sequential compression device  Until discontinued      03/08/20 0133                      Grant Ravi MD  Department of Hospital Medicine   [facility]  Date of service: 03/16/2020

## 2020-03-17 LAB
ALBUMIN SERPL BCP-MCNC: 2.1 G/DL (ref 3.5–5.2)
ALBUMIN SERPL BCP-MCNC: 2.1 G/DL (ref 3.5–5.2)
ALP SERPL-CCNC: 167 U/L (ref 55–135)
ALP SERPL-CCNC: 169 U/L (ref 55–135)
ALT SERPL W/O P-5'-P-CCNC: 10 U/L (ref 10–44)
ALT SERPL W/O P-5'-P-CCNC: 11 U/L (ref 10–44)
ANION GAP SERPL CALC-SCNC: 10 MMOL/L (ref 8–16)
ANION GAP SERPL CALC-SCNC: 11 MMOL/L (ref 8–16)
AST SERPL-CCNC: 19 U/L (ref 10–40)
AST SERPL-CCNC: 19 U/L (ref 10–40)
BACTERIA UR CULT: ABNORMAL
BILIRUB SERPL-MCNC: 1.1 MG/DL (ref 0.1–1)
BILIRUB SERPL-MCNC: 1.2 MG/DL (ref 0.1–1)
BUN SERPL-MCNC: 34 MG/DL (ref 8–23)
BUN SERPL-MCNC: 38 MG/DL (ref 8–23)
CALCIUM SERPL-MCNC: 7.9 MG/DL (ref 8.7–10.5)
CALCIUM SERPL-MCNC: 8.2 MG/DL (ref 8.7–10.5)
CHLORIDE SERPL-SCNC: 100 MMOL/L (ref 95–110)
CHLORIDE SERPL-SCNC: 102 MMOL/L (ref 95–110)
CO2 SERPL-SCNC: 25 MMOL/L (ref 23–29)
CO2 SERPL-SCNC: 26 MMOL/L (ref 23–29)
CREAT SERPL-MCNC: 1.8 MG/DL (ref 0.5–1.4)
CREAT SERPL-MCNC: 1.8 MG/DL (ref 0.5–1.4)
ERYTHROCYTE [DISTWIDTH] IN BLOOD BY AUTOMATED COUNT: 17.2 % (ref 11.5–14.5)
EST. GFR  (AFRICAN AMERICAN): 32.6 ML/MIN/1.73 M^2
EST. GFR  (AFRICAN AMERICAN): 32.6 ML/MIN/1.73 M^2
EST. GFR  (NON AFRICAN AMERICAN): 28.3 ML/MIN/1.73 M^2
EST. GFR  (NON AFRICAN AMERICAN): 28.3 ML/MIN/1.73 M^2
GLUCOSE SERPL-MCNC: 126 MG/DL (ref 70–110)
GLUCOSE SERPL-MCNC: 138 MG/DL (ref 70–110)
GLUCOSE SERPL-MCNC: 140 MG/DL (ref 70–110)
GLUCOSE SERPL-MCNC: 140 MG/DL (ref 70–110)
GLUCOSE SERPL-MCNC: 146 MG/DL (ref 70–110)
GLUCOSE SERPL-MCNC: 153 MG/DL (ref 70–110)
GLUCOSE SERPL-MCNC: 157 MG/DL (ref 70–110)
GLUCOSE SERPL-MCNC: 677 MG/DL (ref 70–110)
HCT VFR BLD AUTO: 24.8 % (ref 37–48.5)
HGB BLD-MCNC: 7.9 G/DL (ref 12–16)
MAGNESIUM SERPL-MCNC: 2.2 MG/DL (ref 1.6–2.6)
MAGNESIUM SERPL-MCNC: 3.3 MG/DL (ref 1.6–2.6)
MCH RBC QN AUTO: 31.6 PG (ref 27–31)
MCHC RBC AUTO-ENTMCNC: 31.9 G/DL (ref 32–36)
MCV RBC AUTO: 99 FL (ref 82–98)
PHOSPHATE SERPL-MCNC: 3.7 MG/DL (ref 2.7–4.5)
PHOSPHATE SERPL-MCNC: 5.5 MG/DL (ref 2.7–4.5)
PLATELET # BLD AUTO: 184 K/UL (ref 150–350)
PMV BLD AUTO: 12.3 FL (ref 9.2–12.9)
POTASSIUM SERPL-SCNC: 3.8 MMOL/L (ref 3.5–5.1)
POTASSIUM SERPL-SCNC: 4.6 MMOL/L (ref 3.5–5.1)
PROT SERPL-MCNC: 4.9 G/DL (ref 6–8.4)
PROT SERPL-MCNC: 5 G/DL (ref 6–8.4)
RBC # BLD AUTO: 2.5 M/UL (ref 4–5.4)
SODIUM SERPL-SCNC: 136 MMOL/L (ref 136–145)
SODIUM SERPL-SCNC: 138 MMOL/L (ref 136–145)
WBC # BLD AUTO: 11.14 K/UL (ref 3.9–12.7)

## 2020-03-17 PROCEDURE — 63600175 PHARM REV CODE 636 W HCPCS: Performed by: INTERNAL MEDICINE

## 2020-03-17 PROCEDURE — 63600175 PHARM REV CODE 636 W HCPCS: Performed by: FAMILY MEDICINE

## 2020-03-17 PROCEDURE — 21000000 HC CCU ICU ROOM CHARGE

## 2020-03-17 PROCEDURE — 25000003 PHARM REV CODE 250: Performed by: INTERNAL MEDICINE

## 2020-03-17 PROCEDURE — 83735 ASSAY OF MAGNESIUM: CPT | Mod: 91

## 2020-03-17 PROCEDURE — 97530 THERAPEUTIC ACTIVITIES: CPT

## 2020-03-17 PROCEDURE — 83735 ASSAY OF MAGNESIUM: CPT

## 2020-03-17 PROCEDURE — B4185 PARENTERAL SOL 10 GM LIPIDS: HCPCS | Performed by: INTERNAL MEDICINE

## 2020-03-17 PROCEDURE — 27000221 HC OXYGEN, UP TO 24 HOURS

## 2020-03-17 PROCEDURE — 94660 CPAP INITIATION&MGMT: CPT

## 2020-03-17 PROCEDURE — 80053 COMPREHEN METABOLIC PANEL: CPT

## 2020-03-17 PROCEDURE — 84100 ASSAY OF PHOSPHORUS: CPT

## 2020-03-17 PROCEDURE — A4217 STERILE WATER/SALINE, 500 ML: HCPCS | Performed by: INTERNAL MEDICINE

## 2020-03-17 PROCEDURE — 25000003 PHARM REV CODE 250: Performed by: FAMILY MEDICINE

## 2020-03-17 PROCEDURE — C9113 INJ PANTOPRAZOLE SODIUM, VIA: HCPCS | Performed by: INTERNAL MEDICINE

## 2020-03-17 PROCEDURE — 85027 COMPLETE CBC AUTOMATED: CPT

## 2020-03-17 PROCEDURE — 97110 THERAPEUTIC EXERCISES: CPT

## 2020-03-17 PROCEDURE — 99900035 HC TECH TIME PER 15 MIN (STAT)

## 2020-03-17 PROCEDURE — 80053 COMPREHEN METABOLIC PANEL: CPT | Mod: 91

## 2020-03-17 PROCEDURE — 84100 ASSAY OF PHOSPHORUS: CPT | Mod: 91

## 2020-03-17 PROCEDURE — 94761 N-INVAS EAR/PLS OXIMETRY MLT: CPT

## 2020-03-17 RX ORDER — FUROSEMIDE 10 MG/ML
20 INJECTION INTRAMUSCULAR; INTRAVENOUS ONCE
Status: COMPLETED | OUTPATIENT
Start: 2020-03-17 | End: 2020-03-17

## 2020-03-17 RX ORDER — PROCHLORPERAZINE EDISYLATE 5 MG/ML
10 INJECTION INTRAMUSCULAR; INTRAVENOUS ONCE
Status: COMPLETED | OUTPATIENT
Start: 2020-03-17 | End: 2020-03-17

## 2020-03-17 RX ADMIN — FUROSEMIDE 20 MG: 10 INJECTION, SOLUTION INTRAMUSCULAR; INTRAVENOUS at 02:03

## 2020-03-17 RX ADMIN — DIPHENHYDRAMINE HYDROCHLORIDE 6.25 MG: 50 INJECTION INTRAMUSCULAR; INTRAVENOUS at 12:03

## 2020-03-17 RX ADMIN — PROCHLORPERAZINE EDISYLATE 10 MG: 5 INJECTION INTRAMUSCULAR; INTRAVENOUS at 04:03

## 2020-03-17 RX ADMIN — PSYLLIUM HUSK 1 PACKET: 3.4 POWDER ORAL at 08:03

## 2020-03-17 RX ADMIN — LOPERAMIDE HYDROCHLORIDE 4 MG: 2 CAPSULE ORAL at 01:03

## 2020-03-17 RX ADMIN — BUTALBITAL, ACETAMINOPHEN AND CAFFEINE 1 TABLET: 50; 325; 40 TABLET ORAL at 10:03

## 2020-03-17 RX ADMIN — MAGNESIUM OXIDE 400 MG: 400 TABLET ORAL at 10:03

## 2020-03-17 RX ADMIN — PANTOPRAZOLE SODIUM 40 MG: 40 INJECTION, POWDER, LYOPHILIZED, FOR SOLUTION INTRAVENOUS at 08:03

## 2020-03-17 RX ADMIN — LOPERAMIDE HYDROCHLORIDE 4 MG: 2 CAPSULE ORAL at 08:03

## 2020-03-17 RX ADMIN — DIPHENOXYLATE HYDROCHLORIDE AND ATROPINE SULFATE 1 TABLET: 2.5; .025 TABLET ORAL at 12:03

## 2020-03-17 RX ADMIN — PSYLLIUM HUSK 2 PACKET: 3.4 POWDER ORAL at 10:03

## 2020-03-17 RX ADMIN — DIPHENOXYLATE HYDROCHLORIDE AND ATROPINE SULFATE 1 TABLET: 2.5; .025 TABLET ORAL at 10:03

## 2020-03-17 RX ADMIN — DRONABINOL 2.5 MG: 2.5 CAPSULE ORAL at 08:03

## 2020-03-17 RX ADMIN — MAGNESIUM OXIDE 400 MG: 400 TABLET ORAL at 08:03

## 2020-03-17 RX ADMIN — HYDROCODONE BITARTRATE AND ACETAMINOPHEN 1 TABLET: 5; 325 TABLET ORAL at 08:03

## 2020-03-17 RX ADMIN — PANTOPRAZOLE SODIUM 40 MG: 40 INJECTION, POWDER, LYOPHILIZED, FOR SOLUTION INTRAVENOUS at 10:03

## 2020-03-17 RX ADMIN — LOPERAMIDE HYDROCHLORIDE 4 MG: 2 CAPSULE ORAL at 10:03

## 2020-03-17 RX ADMIN — DIPHENHYDRAMINE HYDROCHLORIDE 6.25 MG: 50 INJECTION INTRAMUSCULAR; INTRAVENOUS at 06:03

## 2020-03-17 RX ADMIN — SERTRALINE HYDROCHLORIDE 50 MG: 50 TABLET ORAL at 08:03

## 2020-03-17 RX ADMIN — DRONABINOL 2.5 MG: 2.5 CAPSULE ORAL at 04:03

## 2020-03-17 RX ADMIN — LEVOTHYROXINE SODIUM 88 MCG: 88 TABLET ORAL at 08:03

## 2020-03-17 RX ADMIN — DIPHENOXYLATE HYDROCHLORIDE AND ATROPINE SULFATE 1 TABLET: 2.5; .025 TABLET ORAL at 08:03

## 2020-03-17 RX ADMIN — DIPHENHYDRAMINE HYDROCHLORIDE 6.25 MG: 50 INJECTION INTRAMUSCULAR; INTRAVENOUS at 07:03

## 2020-03-17 RX ADMIN — SMOFLIPID: 6; 6; 5; 3 INJECTION, EMULSION INTRAVENOUS at 07:03

## 2020-03-17 RX ADMIN — CEFTRIAXONE SODIUM 1 G: 1 INJECTION, POWDER, FOR SOLUTION INTRAMUSCULAR; INTRAVENOUS at 02:03

## 2020-03-17 RX ADMIN — PSYLLIUM HUSK 2 PACKET: 3.4 POWDER ORAL at 04:03

## 2020-03-17 RX ADMIN — ACETAMINOPHEN 650 MG: 325 TABLET ORAL at 10:03

## 2020-03-17 RX ADMIN — LOPERAMIDE HYDROCHLORIDE 4 MG: 2 CAPSULE ORAL at 06:03

## 2020-03-17 RX ADMIN — SALINE NASAL SPRAY 1 SPRAY: 1.5 SOLUTION NASAL at 09:03

## 2020-03-17 RX ADMIN — DIPHENOXYLATE HYDROCHLORIDE AND ATROPINE SULFATE 1 TABLET: 2.5; .025 TABLET ORAL at 04:03

## 2020-03-17 NOTE — PLAN OF CARE
Problem: Occupational Therapy Goal  Goal: Occupational Therapy Goal  Description  Goals to be met by: discharge     Patient will increase functional independence with ADLs by performing:    UE Dressing with Set-up Assistance.  LE Dressing with Set-up Assistance.  Grooming while standing at sink with Supervision.  Upper extremity exercise program x20 reps per handout, with independence.     Outcome: Ongoing, Progressing

## 2020-03-17 NOTE — RESPIRATORY THERAPY
This note also relates to the following rows which could not be included:  SpO2 - Cannot attach notes to unvalidated device data  Pulse - Cannot attach notes to unvalidated device data  Resp - Cannot attach notes to unvalidated device data       03/17/20 0000   Patient Assessment/Suction   Level of Consciousness (AVPU) alert   Respiratory Effort Normal;Unlabored   Expansion/Accessory Muscles/Retractions no use of accessory muscles;expansion symmetric   All Lung Fields Breath Sounds Anterior:;Lateral:   ROHINI Breath Sounds clear;coarse   LLL Breath Sounds clear   RUL Breath Sounds clear   RML Breath Sounds clear   RLL Breath Sounds clear   Rhythm/Pattern, Respiratory assisted mechanically   Cough Frequency no cough   PRE-TX-O2   O2 Device (Oxygen Therapy) BiPAP   $ Is the patient on Low Flow Oxygen? Yes   Pulse Oximetry Type Continuous   $ Pulse Oximetry - Multiple Charge Pulse Oximetry - Multiple   Preset CPAP/BiPAP Settings   Mode Of Delivery BiPAP   $ CPAP/BiPAP Daily Charge BiPAP/CPAP Daily   $ Initial CPAP/BiPAP Setup? Yes   $ Is patient using? Yes   Size of Mask Small   Sized Appropriately? Yes   Equipment Type V60   Airway Device Type small full face mask   Humidifier not applicable   Ipap 23   EPAP (cm H2O) 6   Pressure Support (cm H2O) 17   Set Rate (Breaths/Min) 12   ITime (sec) 0.9   Rise Time (sec) 3   Patient CPAP/BiPAP Settings   Timed Inspiration (Sec) 0.9   IPAP Rise Time (sec) 3   RR Total (Breaths/Min) 25   Tidal Volume (mL) 433   VE Minute Ventilation (L/min) 9.2 L/min   Peak Inspiratory Pressure (cm H2O) 12   TiTOT (%) 25   Total Leak (L/Min) 0   Patient Trigger - ST Mode Only (%) 100   CPAP/BiPAP Alarms   High Pressure (cm H2O) 40   Low Pressure (cm H2O) 8   High RR (breaths/min) 40   Low RR (breaths/min) 20   Apnea (Sec) 20

## 2020-03-17 NOTE — NURSING
Last night around 2200 patient was complaining of pain in her lower stomach and having the urge to urinate. Unable to irrigate lee. DR Sherwood was notified. Bladder scanner showed over 747cc in the bladder. DR Sherwood gave orders to remove the lee and place a new one. An 18-Polish urinary catheter was placed, 650cc immediately drained from bladder. Patient stated relief. No further complications overnight. Patient is resting and free of injury. Personal items and call light within reach. Will continue to monitor.

## 2020-03-17 NOTE — CONSULTS
INPATIENT NEPHROLOGY CONSULT   [facility] NEPHROLOGY    Patient Name: Shara Hutchins  Date: 03/17/2020    Reason for consultation: DARCI/CKD    Chief Complaint: Diarrhea    History of Present Illness:  68 y/o F with hx of HTN, NIDDM-2, CKD-3, vulvar cancer (s/p chemo/radiation), and now s/p ileostomy after pneumatosis intestinalis earlier in 2020 and with persistent diarrhea 2/2 chemotherapy who p/w high ostomy output, treated with TPN/octreotide, awaiting LTAC, developed gross hematuria 3/13, noted to have bump in Cr and Hb drop, consulted for DARCI and anemia.    3/17 VSS, no new complains.    Plan of Care:    1. DARCI/CKD III  - sCr seem better  - monitor UOP  - no nsaids or IV contrast    2. Chronic volume depletion 2/2 diarrhea  - monitor ostomy output  - on LR + TPN  - on multiple stool bulking agents, octreotide; surgery in future    3. Gross hematuria  - clearing in lee    4. Acute on chronic anemia of CKD  - transfuse 1 unit of blood on 3/15  - ADAM weekly 100 units/kg SQ.    5. Edema  - ok with lasix PRN    Thank you for allowing us to participate in this patient's care. We will continue to follow.    Vital Signs:  Temp Readings from Last 3 Encounters:   03/17/20 99.3 °F (37.4 °C)   03/07/20 98.2 °F (36.8 °C) (Oral)   03/05/20 97.8 °F (36.6 °C) (Oral)       Pulse Readings from Last 3 Encounters:   03/17/20 93   03/07/20 (!) 57   03/05/20 61       BP Readings from Last 3 Encounters:   03/17/20 (!) 146/69   03/07/20 131/61   03/05/20 (!) 169/74       Weight:  Wt Readings from Last 3 Encounters:   03/08/20 63.5 kg (139 lb 15.9 oz)   03/06/20 53 kg (116 lb 13.5 oz)   03/07/20 52.6 kg (116 lb)       Past Medical & Surgical History:  Past Medical History:   Diagnosis Date    Chronic kidney disease, stage III (moderate) 1/7/2018    Diabetes mellitus, type 2     Fatigue     Hypertension     Iron deficiency anemia due to chronic blood loss 1/7/2018    Vulvar cancer 1/7/2019       Past Surgical History:   Procedure  Laterality Date    ABDOMINAL SURGERY      BACK SURGERY      carpel tunnel surgery once on each hand      CERVICAL FUSION      x 4    cervix repair      CHOLECYSTECTOMY      DIAGNOSTIC LAPAROSCOPY N/A 2020    Procedure: LAPAROSCOPY, DIAGNOSTIC;  Surgeon: Robbi Lovell III, MD;  Location: [facility] OR;  Service: General;  Laterality: N/A;    HYSTERECTOMY  1980    ILEOSTOMY N/A 2020    Procedure: CREATION, ILEOSTOMY Loop;  Surgeon: Robbi Lovell III, MD;  Location: [facility] OR;  Service: General;  Laterality: N/A;    LUMBAR LAMINECTOMY      LYSIS OF ADHESIONS N/A 2020    Procedure: LYSIS, ADHESIONS;  Surgeon: Robbi Lovell III, MD;  Location: [facility] OR;  Service: General;  Laterality: N/A;    NECK SURGERY      PHLEBOGRAPHY  2020    Procedure: VENOGRAM;  Surgeon: Robbi Lovell III, MD;  Location: [facility];  Service: General;;    REMOVAL OF VASCULAR ACCESS PORT Right 2020    Procedure: REMOVAL, VASCULAR ACCESS PORT;  Surgeon: Robbi Lovell III, MD;  Location: [facility];  Service: General;  Laterality: Right;    SHOULDER SURGERY      vulva cancer         Past Social History:  Social History     Socioeconomic History    Marital status:      Spouse name: Not on file    Number of children: Not on file    Years of education: Not on file    Highest education level: Not on file   Occupational History    Not on file   Social Needs    Financial resource strain: Not on file    Food insecurity:     Worry: Not on file     Inability: Not on file    Transportation needs:     Medical: Not on file     Non-medical: Not on file   Tobacco Use    Smoking status: Former Smoker     Packs/day: 1.00     Years: 33.00     Pack years: 33.00     Types: Cigarettes     Last attempt to quit: 2000     Years since quittin.6    Smokeless tobacco: Never Used   Substance and Sexual Activity    Alcohol use: No    Drug use: No    Sexual activity: Not on file   Lifestyle     Physical activity:     Days per week: Not on file     Minutes per session: Not on file    Stress: Not on file   Relationships    Social connections:     Talks on phone: Not on file     Gets together: Not on file     Attends Christian service: Not on file     Active member of club or organization: Not on file     Attends meetings of clubs or organizations: Not on file     Relationship status: Not on file   Other Topics Concern    Not on file   Social History Narrative    Not on file       Medications:  Scheduled Meds:   cefTRIAXone (ROCEPHIN) IVPB  1 g Intravenous Q24H    diphenhydrAMINE  6.25 mg Intravenous Q6H    diphenoxylate-atropine 2.5-0.025 mg  1 tablet Oral QID    dronabinoL  2.5 mg Oral BID AC    epoetin nica-ebpx (RETACRIT) injection  100 Units/kg Subcutaneous Q7 Days    levothyroxine  88 mcg Oral Before breakfast    loperamide  4 mg Oral QID    magnesium oxide  400 mg Oral BID    pantoprazole  40 mg Intravenous BID    psyllium husk (with sugar)  2 packet Oral TID    sertraline  50 mg Oral Daily    sodium chloride  1 spray Each Nostril Daily     Continuous Infusions:   dextrose 10 % in water (D10W)      TPN ADULT CENTRAL LINE CUSTOM (3 in 1) 50 mL/hr at 03/16/20 1659     PRN Meds:.sodium chloride, acetaminophen, butalbital-acetaminophen-caffeine -40 mg, calcium chloride IVPB, calcium chloride IVPB, calcium chloride IVPB, calcium chloride IVPB, calcium chloride IVPB, calcium chloride IVPB, dextrose 10 % in water (D10W), dextrose 50%, dextrose 50%, glucagon (human recombinant), glucose, glucose, HYDROcodone-acetaminophen, hyoscyamine, magnesium oxide, magnesium oxide, magnesium sulfate IVPB, magnesium sulfate IVPB, magnesium sulfate IVPB, magnesium sulfate IVPB, magnesium sulfate IVPB, magnesium sulfate IVPB, magnesium sulfate IVPB, magnesium sulfate IVPB, melatonin, ondansetron, potassium chloride in water, potassium chloride in water, potassium chloride in water, potassium  chloride in water, potassium chloride in water, potassium chloride in water, potassium chloride in water, potassium chloride in water, potassium chloride, potassium chloride, potassium chloride, potassium chloride, potassium chloride, potassium chloride, potassium chloride, potassium chloride, sodium chloride 0.9%, sodium phosphate IVPB, sodium phosphate IVPB, sodium phosphate IVPB, sodium phosphate IVPB, sodium phosphate IVPB, sodium phosphate IVPB, sodium phosphate IVPB, sodium phosphate IVPB, sodium phosphate IVPB, sodium phosphate IVPB  No current facility-administered medications on file prior to encounter.      Current Outpatient Medications on File Prior to Encounter   Medication Sig Dispense Refill    acetaminophen (TYLENOL) 325 MG tablet Take 2 tablets (650 mg total) by mouth every 4 (four) hours as needed.  0    cholestyramine-aspartame (QUESTRAN LIGHT) 4 gram PwPk Take 1 packet (4 g total) by mouth 2 (two) times daily. 180 packet 3    dextrose 50% injection Inject 25 mLs (12.5 g total) into the vein as needed (between 51 - 70 mg/dL if patient cannnot take PO but has IV access.).      dextrose 50% injection Inject 50 mLs (25 g total) into the vein as needed (less than 50 mg/dL if patient cannnot take PO but has IV access.).      diphenhydrAMINE (BENADRYL) 50 mg/mL injection Inject 0.125 mLs (6.25 mg total) into the vein every 6 (six) hours.      glucose 4 GM chewable tablet Take 4 tablets (16 g total) by mouth as needed.  12    glucose 4 GM chewable tablet Take 6 tablets (24 g total) by mouth as needed.  12    HYDROcodone-acetaminophen (NORCO) 5-325 mg per tablet Take 1 tablet by mouth every 6 (six) hours as needed.  0    levothyroxine (SYNTHROID) 88 MCG tablet Take 1 tablet (88 mcg total) by mouth before breakfast. 30 tablet 11    [] loperamide (IMODIUM) 2 mg capsule Take 2 capsules (4 mg total) by mouth 4 (four) times daily. for 10 days  0    melatonin (MELATIN) 3 mg tablet Take 3  "tablets (9 mg total) by mouth nightly as needed for Insomnia.  0    ondansetron (ZOFRAN-ODT) 8 MG TbDL Take 1 tablet (8 mg total) by mouth every 8 (eight) hours as needed.      oxyCODONE-acetaminophen (PERCOCET) 5-325 mg per tablet Take 1 tablet by mouth every 4 (four) hours as needed.  0    pantoprazole (PROTONIX) 40 mg injection Inject 40 mg into the vein 2 (two) times daily. 2400 mg 11    psyllium husk, with sugar, 3.4 gram packet Take 2 packets by mouth 3 (three) times daily.      Ringer's solution,lactated (LACTATED RINGERS) infusion Inject 125 mL/hr into the vein continuous.         Allergies:  Ciprofloxacin and Pcn [penicillins]    Past Family History:  Reviewed; refer to Hospitalist Admission Note    Review of Systems:  Review of Systems - All 14 systems reviewed and negative, except as noted in HPI    Physical Exam:  BP (!) 146/69   Pulse 93   Temp 99.3 °F (37.4 °C)   Resp (!) 41   Ht 5' 7.5" (1.715 m)   Wt 63.5 kg (139 lb 15.9 oz)   SpO2 (!) 94%   Breastfeeding? No   BMI 21.60 kg/m²     INS/OUTS:  I/O last 3 completed shifts:  In: 3823 [P.O.:1340]  Out: 5250 [Urine:4475; Stool:775]  I/O this shift:  In: 240 [P.O.:240]  Out: 450 [Urine:450]    General Appearance:    NAD, AAO x 3, cooperative, appears stated age   Head:    Normocephalic, atraumatic   Eyes:    PER, EOMI, and conjunctiva/sclera clear bilaterally        Mouth:   Moist mucus membranes, no thrush or oral lesions, normal      dentition   Back:     No CVA tenderness   Lungs:     Clear to auscultation bilaterally, no wheeze, crackles, rales      or rhonchi, symmetric air movement, respirations unlabored   Heart:    Regular rate and rhythm, S1 and S2 normal, no murmur, rub   or gallop   Abdomen:     Soft, non-tender, non-distended, bowel sounds active all four   quadrants, no RT or guarding, no masses, no organomegaly   Extremities:   Warm and well perfused, distal pulses intact, no cyanosis or    peripheral edema   MSK:   No joint or " muscle swelling, tenderness or deformity   Skin:   Skin color, texture, turgor normal, no rashes or lesions   Neurologic/Psychiatric:   CNII-XII intact, normal strength and sensation       throughout, no asterixis; normal affect, memory, judgement     and insight     Results:  Lab Results   Component Value Date     03/17/2020    K 4.6 03/17/2020     03/17/2020    CO2 25 03/17/2020    BUN 38 (H) 03/17/2020    CREATININE 1.8 (H) 03/17/2020    CALCIUM 7.9 (L) 03/17/2020    ANIONGAP 11 03/17/2020    ESTGFRAFRICA 32.6 (A) 03/17/2020    EGFRNONAA 28.3 (A) 03/17/2020       Lab Results   Component Value Date    CALCIUM 7.9 (L) 03/17/2020    PHOS 5.5 (H) 03/17/2020       Recent Labs   Lab 03/17/20  0423   WBC 11.14   RBC 2.50*   HGB 7.9*   HCT 24.8*      MCV 99*   MCH 31.6*   MCHC 31.9*       I have personally reviewed pertinent radiological imaging and reports.    Jorge Webb MD  [facility]  [address] [place] [id number]  [phone] (p)  [phone] (f)

## 2020-03-17 NOTE — PT/OT/SLP PROGRESS
"Physical Therapy Treatment    Patient Name:  Shara Hutchins   MRN:  [id number]    Recommendations:     Discharge Recommendations:  nursing facility, skilled, LTACH (long-term acute care hospital)   Discharge Equipment Recommendations: (tbd)   Barriers to discharge: None    Assessment:     Shara Hutchins is a 69 y.o. female admitted with a medical diagnosis of Chronic diarrhea.  She presents with the following impairments/functional limitations:  weakness, impaired functional mobilty, impaired balance, impaired self care skills, impaired cardiopulmonary response to activity, gait instability, impaired endurance . Pt agrees to PT but unable to tolerate more than sitting EOB x 5 min due to decrease in O2 sats on 5L to 84%. Noted fatigue and sob when returned to bed. RN aware. Pt reports "they are going to give my lasix". /59 after sitting EOB.      Rehab Prognosis: Fair; patient would benefit from acute skilled PT services to address these deficits and reach maximum level of function.    Recent Surgery: * No surgery found *      Plan:     During this hospitalization, patient to be seen 6 x/week(change in frequency due to limited therapy due to medical status. ) to address the identified rehab impairments via gait training, therapeutic activities, therapeutic exercises and progress toward the following goals:    · Plan of Care Expires:  03/18/20    Subjective     Chief Complaint: sob  Patient/Family Comments/goals: to get better  Pain/Comfort:  · Pain Rating 1: (headache but did not give number)  · Location 1: head  · Pain Addressed 1: Nurse notified      Objective:     Communicated with rn prior to session.  Patient found HOB elevated with pulse ox (continuous), peripheral IV, telemetry, blood pressure cuff, lee catheter upon PT entry to room.     General Precautions: Standard, fall   Orthopedic Precautions:N/A   Braces: N/A     Functional Mobility:  · Bed Mobility:     · Rolling Right: supervision  · Scooting: " supervision  · Supine to Sit: supervision  · Sit to Supine: supervision      AM-PAC 6 CLICK MOBILITY  Turning over in bed (including adjusting bedclothes, sheets and blankets)?: 4  Sitting down on and standing up from a chair with arms (e.g., wheelchair, bedside commode, etc.): 3  Moving from lying on back to sitting on the side of the bed?: 4  Moving to and from a bed to a chair (including a wheelchair)?: 2  Need to walk in hospital room?: 1  Climbing 3-5 steps with a railing?: 1  Basic Mobility Total Score: 15       Therapeutic Activities and Exercises:   education, fall prevention, poc, dc planning. Pt is willing to participate but medical status is limiting her.     Patient left HOB elevated with all lines intact, call button in reach, bed alarm on and rn notified..    GOALS:   Multidisciplinary Problems     Physical Therapy Goals        Problem: Physical Therapy Goal    Goal Priority Disciplines Outcome Goal Variances Interventions   Physical Therapy Goal     PT, PT/OT Ongoing, Progressing     Description:  Goals to be met by: D/C    Patient will increase functional independence with mobility by performin. Supine to sit with Modified Bruce  2. Sit to stand transfer with Stand-by Assistance  3. Gait  x 100 feet with Stand-by Assistance using Rolling Walker.                       Time Tracking:     PT Received On: 20  PT Start Time: 1325     PT Stop Time: 1336  PT Total Time (min): 11 min     Billable Minutes: Therapeutic Activity 11    Treatment Type: Treatment  PT/PTA: PT     PTA Visit Number: 0     Chapis Clifton, PT  2020

## 2020-03-17 NOTE — PT/OT/SLP PROGRESS
Occupational Therapy   Treatment    Name: Shara Hutchins  MRN: [id number]  Admitting Diagnosis:  Chronic diarrhea       Recommendations:     Discharge Recommendations: LTACH (long-term acute care hospital)  Discharge Equipment Recommendations:  walker, rolling  Barriers to discharge:  Decreased caregiver support    Assessment:     Shara Hutchins is a 69 y.o. female with a medical diagnosis of Chronic diarrhea.  She presents with improving medical acuity, but anxious about getting OOB. Patient encouraged to sit EOB this session to gain confidence that she will be able to perform more activity every session. Performance deficits affecting function are weakness, impaired endurance, impaired self care skills, impaired functional mobilty.     Rehab Prognosis:  Fair; patient would benefit from acute skilled OT services to address these deficits and reach maximum level of function.       Plan:     Patient to be seen 5 x/week to address the above listed problems via self-care/home management, therapeutic activities, therapeutic exercises  · Plan of Care Expires:    · Plan of Care Reviewed with: patient    Subjective     Pain/Comfort:  · Pain Rating 1: 0/10  · Pain Rating Post-Intervention 1: 0/10    Objective:     Communicated with: nurse prior to session.  Patient found HOB elevated with lee catheter, telemetry, peripheral IV, pulse ox (continuous), oxygen, blood pressure cuff upon OT entry to room.    General Precautions: Standard, fall   Orthopedic Precautions:N/A   Braces: N/A     Occupational Performance:     Bed Mobility:    · Patient completed Scooting/Bridging with contact guard assistance  · Patient completed Supine to Sit with contact guard assistance  · Patient completed Sit to Supine with contact guard assistance   · Performed unsupported sitting EOB for 5 minutes with contact guard assistance    Functional Mobility/Transfers:  · Patient declined this session    BUE/BLE Exercise:  · BUE/BLE AROM exercises x 10  repetitions     [facility] 6 Click ADL:      Treatment & Education:  Patient able to tolerate bed level BUE/BLE exercises, bed mobility and  Unsupported sitting EOB with O2 sats ranging from 85% to 91% during session. Patient reminded to use Pursed Lip Breathing (PLB) throughout session.    Patient left HOB elevated with all lines intact, call button in reach and bed alarm onEducation:      GOALS:   Multidisciplinary Problems     Occupational Therapy Goals        Problem: Occupational Therapy Goal    Goal Priority Disciplines Outcome Interventions   Occupational Therapy Goal     OT, PT/OT Ongoing, Progressing    Description:  Goals to be met by: discharge     Patient will increase functional independence with ADLs by performing:    UE Dressing with Set-up Assistance.  LE Dressing with Set-up Assistance.  Grooming while standing at sink with Supervision.  Upper extremity exercise program x20 reps per handout, with independence.                      Time Tracking:     OT Date of Treatment: 04/17/20  OT Start Time: 1049  OT Stop Time: 1123  OT Total Time (min): 34 min    Billable Minutes:Therapeutic Activity 20  Therapeutic Exercise 14    Dashawn Sutherland OT  3/17/2020

## 2020-03-17 NOTE — PLAN OF CARE
03/17/20 0845   Discharge Reassessment   Assessment Type Discharge Planning Reassessment   Anticipated Discharge Disposition SNF   Discharge Plan A Long-term acute care facility (LTAC)   Discharge Plan B Skilled Nursing Facility   DME Needed Upon Discharge  none   Post-Acute Status   Post-Acute Authorization Placement   Post-Acute Placement Status Referrals Sent     SW following up on placement status this date. Per notes from 3/11/20 and 3/16/20 from SARAH Dawn, [facility] does not have an LTAC in network and could not work out a favorable contract with [facility]. Referrals were sent yesterday to [facility] and [facility] for SNF placement.     [facility]: LINWOOD spoke with Nadira ([phone]) who confirmed receipt of referral and stated it will be reviewed today.  11:03 SW informed by Nadira that pt is denied due to TPN.    [facility]:SW left messages for Tiffanie Fink ([phone]) and Kalin Cannon ([phone]) requesting a call back.  9:12 LINWOOD received a call back from Kalin stating none of the agencies they are over ([facility] in [place], [facility], [facility]) would be able to take a pt on TPN. Kalin stated he is not aware of any facility in [facility] or anywhere in [place] that can take TPN patients.    9:15 SW left a msg for admissions at [facility] in [place] [phone]) to see if they could accept a referral for a pt on TPN.    9:44 LINWOOD followed back up with LISSETTE. Previous auth for LTAC at [facility] was voided because [facility] offered to pay 100% Medicare allowable and they wanted a per jose rate. SW confirmed they have no in-network LTACs. For new auth request, clinicals need to be faxed to [phone]. LINWOOD spoke again with Eufemia from [facility] ([phone]), who stated [facility] was refusing to pay for meds and TPN. LINWOOD informed Eufemia that pt is now on telemetry. Per Eufemia, this may result in a  higher rate of pay which may allow them to accept pt. Eufemia is verifying this and will call this SW back,.    10:47 Eufemia requested this SW re-send referral so that they can f/u with [facility] about rate on telemetry. LINWOOD faxed to [employer] ([phone]) and sent new auth request to [facility] ([phone]).    14:42 SW received a call from [facility] earlier stating they received clinicals. Per Eufemia, [employer] attempted to call up to their contacts Basia and Arlette, and had to leave messages. Their admissions went ahead and will resubmit auth request. LINWOOD to await response.

## 2020-03-17 NOTE — PROGRESS NOTES
[facility]  Adult Nutrition   Progress Note (Nutrition Support Management)    SUMMARY     Recommendations  Recommendation/Intervention:   1. New TPN ordered to be initiated today @ 1700.   2. RD ordered new weight to be obtained.   3. Recommend NPO status if ostomy output does not decrease. Continued high output may worsen renal function.  4. RD believes other interventions should be considered to help decrease output: discontinue appetite stimulant, start a probiotic, discontinue medications with sugar alcohols if medically able.   5. Blood glucose and electrolytes to be monitored and corrected accordingly.  6. RD to monitor PO intake, TPN infusion and tolerance. Monitor I/Os. Will continue to monitor and manage nutrition support daily.      Goals:   1. Patient to meet estimated needs via TPN provision.  2. Weight to be obtained and evaluated.  3. Ostomy output to be controlled.   4. Blood glucose and electrolyte to trendtowards normal limits.       Nutrition Goal Status: progressing towards goal    Dietitian Rounds Brief  TPN to continue. New TPN ordered. RD reordered labs due to current lab results suspicious for contamination. Notified RN to draw new blood. RN states TPN on hold to allow blood to recirculate due to suspicion of TPN contaminating blood/labs. RD called Lab at 13:38, blood still not recieved. RD ordered TPN, conservative with electrolytes. RD to review new labs once resulted. Patient has been fairly stable and RD does not expect any complications with new TPN composition.     PARENTERAL NUTRITION PROGRESS NOTE:    Parenteral Nutrition Day # 17  Diagnosis/Indication for PN: high ostomy output, severe malnutrition   IV Access: PICC  Diet/Tube Feeding: bland diet          Admixture Type: 3-in-1 TPN, custom (15% AA, 70% dextrose, 20% Smoflipid)   Infusion Rate and Frequency: 50 ml/hr   Patient Acuity: Acute care     PN Composition:   51 grams Amino Acid, 301 grams Dextrose, and 60 grams  "Lipid.    Today's TPN provides 1827 kcal.     Please see order for electrolytes and additives content and adjustments.   *The Nutrition Support Team will continue to monitor electrolytes daily and adjust parenteral nutrition as warranted.    Reason for Assessment  Reason For Assessment: new TPN, RD follow-up  Interdisciplinary Rounds: attended    Nutrition Risk Screen  Nutrition Risk Screen: tube feeding or parenteral nutrition       Ileostomy 02/13/20 1600 Loop RLQ-Wound Image: Images linked       Pressure Injury 02/27/20 1837 Left Buttocks Stage 2-Wound Image: Images linked  MST Score: 0  Have you recently lost weight without trying?: No(not since coming to hospital)  Weight loss score: 0  Have you been eating poorly because of a decreased appetite?: No  Appetite score: 0       Nutrition/Diet History  Spiritual, Cultural Beliefs, Taoist Practices, Values that Affect Care: yes  Food Allergies: NKFA  Factors Affecting Nutritional Intake: malabsorption, altered gastrointestinal function  Nutrition Support Formula Prior to Admit: Other (see comments)(Custom TPN )    Anthropometrics  Temp: 99 °F (37.2 °C)  Height Method: Stated  Height: 5' 7.5" (171.5 cm)  Height (inches): 67.5 in  Weight Method: Bed Scale  Weight: 63.5 kg (139 lb 15.9 oz)  Weight (lb): 139.99 lb  Ideal Body Weight (IBW), Female: 137.5 lb  % Ideal Body Weight, Female (lb): 101.81 %  BMI (Calculated): 21.6  BMI Grade: 18.5-24.9 - normal       Weight History:  Wt Readings from Last 10 Encounters:   03/08/20 63.5 kg (139 lb 15.9 oz)   03/06/20 53 kg (116 lb 13.5 oz)   03/07/20 52.6 kg (116 lb)   03/03/20 53 kg (116 lb 13.5 oz)   02/19/20 58.7 kg (129 lb 4.8 oz)   02/19/20 58.8 kg (129 lb 9.6 oz)   02/14/20 56.7 kg (125 lb)   02/05/20 56.9 kg (125 lb 6.4 oz)   01/22/20 57.2 kg (126 lb)   01/08/20 61.4 kg (135 lb 6.4 oz)       Lab/Procedures/Meds: Pertinent Labs Reviewed  Clinical Chemistry:  Recent Labs   Lab 03/15/20  7626 03/16/20  0525 03/17/20  0423 "    138 136   K 4.0 4.0 4.6    107 100   CO2 25 23 25   * 122* 677*   BUN 41* 39* 38*   CREATININE 2.0* 1.8* 1.8*   CALCIUM 7.8* 7.9* 7.9*   PROT 4.9* 5.3* 4.9*   ALBUMIN 2.2* 2.3* 2.1*   BILITOT 1.1* 1.8* 1.1*   ALKPHOS 208* 191* 169*   AST 20 22 19   ALT 12 13 10   ANIONGAP 8 8 11   ESTGFRAFRICA 28.7* 32.6* 32.6*   EGFRNONAA 24.9* 28.3* 28.3*   MG 2.3 2.3 3.3*   PHOS 4.5 4.1 5.5*     CBC:   Recent Labs   Lab 03/17/20  0423   WBC 11.14   RBC 2.50*   HGB 7.9*   HCT 24.8*      MCV 99*   MCH 31.6*   MCHC 31.9*     Lipid Panel:  Recent Labs   Lab 03/16/20  0525   TRIG 55     Thyroid & Parathyroid:  Recent Labs   Lab 03/14/20  0504   TSH 3.770   FREET4 0.84     Medications: Pertinent Medications reviewed  Scheduled Meds:   cefTRIAXone (ROCEPHIN) IVPB  1 g Intravenous Q24H    diphenhydrAMINE  6.25 mg Intravenous Q6H    diphenoxylate-atropine 2.5-0.025 mg  1 tablet Oral QID    dronabinoL  2.5 mg Oral BID AC    epoetin nica-ebpx (RETACRIT) injection  100 Units/kg Subcutaneous Q7 Days    furosemide (LASIX) IV  20 mg Intravenous Once    levothyroxine  88 mcg Oral Before breakfast    loperamide  4 mg Oral QID    magnesium oxide  400 mg Oral BID    pantoprazole  40 mg Intravenous BID    psyllium husk (with sugar)  2 packet Oral TID    sertraline  50 mg Oral Daily    sodium chloride  1 spray Each Nostril Daily     Continuous Infusions:   dextrose 10 % in water (D10W)      TPN ADULT CENTRAL LINE CUSTOM (3 in 1) 50 mL/hr at 03/16/20 1659    TPN ADULT CENTRAL LINE CUSTOM (3 in 1)       PRN Meds:.sodium chloride, acetaminophen, butalbital-acetaminophen-caffeine -40 mg, calcium chloride IVPB, calcium chloride IVPB, calcium chloride IVPB, calcium chloride IVPB, calcium chloride IVPB, calcium chloride IVPB, dextrose 10 % in water (D10W), dextrose 50%, dextrose 50%, glucagon (human recombinant), glucose, glucose, HYDROcodone-acetaminophen, hyoscyamine, magnesium oxide, magnesium oxide,  magnesium sulfate IVPB, magnesium sulfate IVPB, magnesium sulfate IVPB, magnesium sulfate IVPB, magnesium sulfate IVPB, magnesium sulfate IVPB, magnesium sulfate IVPB, magnesium sulfate IVPB, melatonin, ondansetron, potassium chloride in water, potassium chloride in water, potassium chloride in water, potassium chloride in water, potassium chloride in water, potassium chloride in water, potassium chloride in water, potassium chloride in water, potassium chloride, potassium chloride, potassium chloride, potassium chloride, potassium chloride, potassium chloride, potassium chloride, potassium chloride, sodium chloride 0.9%, sodium phosphate IVPB, sodium phosphate IVPB, sodium phosphate IVPB, sodium phosphate IVPB, sodium phosphate IVPB, sodium phosphate IVPB, sodium phosphate IVPB, sodium phosphate IVPB, sodium phosphate IVPB, sodium phosphate IVPB    Estimated/Assessed Needs  Weight Used For Calorie Calculations: 63.5 kg (139 lb 15.9 oz)  Energy Calorie Requirements (kcal): 1905 - 2223 (30 - 35)   Energy Need Method: Kcal/kg  Protein Requirements: 95 - 127 (1.5 - 2 g/kg)   Weight Used For Protein Calculations: 63.5 kg (139 lb 15.9 oz)     Estimated Fluid Requirement Method: RDA Method  RDA Method (mL): 1905       Nutrition Prescription Ordered  Current Diet Order: Regular Diet   Current Nutrition Support Formula Ordered: Other (Comment)(custom TPN )  Current Nutrition Support Rate Ordered: 65 (ml)  Current Nutrition Support Frequency Ordered: ml/hr     Evaluation of Received Nutrient/Fluid Intake  Parenteral Calories (kcal): 2007  Parenteral Protein (gm): 96  Parenteral Fluid (mL): 1560  Energy Calories Required: meeting needs  Protein Required: meeting needs  Fluid Required: meeting needs  Tolerance: tolerating     Intake/Output Summary (Last 24 hours) at 3/17/2020 1348  Last data filed at 3/17/2020 1247  Gross per 24 hour   Intake 850 ml   Output 2450 ml   Net -1600 ml     Nutrition Risk  Level of Risk/Frequency of  Follow-up: high     Monitor and Evaluation  Food and Nutrient Intake: energy intake, food and beverage intake, parenteral nutrition intake  Food and Nutrient Adminstration: diet order, enteral and parenteral nutrition administration  Physical Activity and Function: nutrition-related ADLs and IADLs, factors affecting access to physical activity  Anthropometric Measurements: weight change, body mass index, weight  Biochemical Data, Medical Tests and Procedures: gastrointestinal profile, electrolyte and renal panel, lipid profile, glucose/endocrine profile, inflammatory profile  Nutrition-Focused Physical Findings: overall appearance     Nutrition Follow-Up  RD Follow-up?: Yes     Sandra Lovell RD 03/17/2020 1:51 PM

## 2020-03-17 NOTE — PROGRESS NOTES
[facility] Medicine Progress Note    Patient Name: Shara Hutchins MRN: [id number]   Patient Class: IP- Inpatient  Length of Stay: 8   Admission Date: 3/7/2020  8:42 PM Attending Physician: Digna Griffin MD   Primary Care Provider: April Horton MD Face-to-Face encounter date: 03/17/2020   Chief Complaint: No chief complaint on file.        Subjective:    Hospital course:  Patient was admitted for severe dehydration and malnutrition with 20 lb weight loss at home due to high output from the ileostomy.  Patient was started on TPN and IV fluids.  Patient continued to have high output to the ileostomy with multiple anti-diarrheal agents.  Patient was started on octreotide with improved diarrhea.  Patient's symptoms improved during hospitalization.  Patient was tolerating diet and had improved nutrition.  Octreotide was discontinued however patient started having watery diarrhea and increased output.  Discussed with surgery who has concerns for short gut syndrome.  Plan for reversal of ileostomy in several weeks.  Adjusted patient's antidiarrhea medication with improved output from the ostomy.  Discontinue octreotide.  Clinical course complicated by recurrent DARCI and third-spacing causing pitting edema.  3/16:  Has increased shortness of breath.  Requiring 2 L nasal cannula saturating 94%.  Continues to have pitting edema and upper and lower extremities.  Suspect 3rd spacing.  DVT negative on ultrasound.  Stool is more formed today.  Continues to be off octreotide.  Transfuse 1 PRBC yesterday.  Started on EPO per Nephrology.  IV Lasix ordered.  Has good urine output.  Afebrile the past 24 hr.  Urine culture grew gram-negative estelle greater than 100,000.  Rocephin started.  Awaiting for LTAC placement.     Interval History: .    Pt  Continued to be hypoxic saturating 88-90% on 4 l oxy possibly 2/2 PRBC transfusion?  Rales bilateral lower zone give another dose of iv lasix  pulm eval prn  "tomorrow  bipap at night   Overnight patient had acute urinary retention, Lee changed.  Continued to have hematuria,with occasional clots, slowly Hb trending down  F/u urology for CBI  Reports good bedrest with prochlorperazine  Reports formed stool    Review of Systems All other Review of Systems were found to be negative expect for that mentioned already in HPI.     Objective:   Physical Exam  BP (!) 121/59   Pulse 88   Temp 99 °F (37.2 °C) (Oral)   Resp (!) 51   Ht 5' 7.5" (1.715 m)   Wt 63.5 kg (139 lb 15.9 oz)   SpO2 (!) 91%   Breastfeeding? No   BMI 21.60 kg/m²   Vitals reviewed.    Constitutional: No acute distress.   HENT: Atraumatic. PERRLA  Cardiovascular: Normal rate, regular rhythm and normal heart sounds.   Pulmonary/Chest: Effort normal. She has no wheezes blt rales. S/p port   Abdominal: Soft. no tenderness,no distension,bowel sounds are normal  S/p ostomy ,lee  Neurological: AOX3  Skin: Skin is warm and dry.     Following labs were Reviewed   Recent Labs   Lab 03/17/20  0423   WBC 11.14   HGB 7.9*   HCT 24.8*      CALCIUM 7.9*   ALBUMIN 2.1*   PROT 4.9*      K 4.6   CO2 25      BUN 38*   CREATININE 1.8*   ALKPHOS 169*   ALT 10   AST 19   BILITOT 1.1*     No results found for: POCTGLUCOSE     Microbiology Results (last 7 days)     Procedure Component Value Units Date/Time    Urine Culture High Risk [[id number]]  (Abnormal)  (Susceptibility) Collected:  03/15/20 4320    Order Status:  Completed Specimen:  Urine, Catheterized Updated:  03/17/20 0632     Urine Culture, Routine KLEBSIELLA PNEUMONIAE  >100,000 cfu/ml      Narrative:       Indicated criteria for high risk culture:->Other  Other (specify):->sick        X-Ray Chest AP Portable   Final Result      Increasing diffuse bilateral lung opacity consistent with pulmonary edema, ARDS, or diffuse pneumonia         Electronically signed by: Maykel Schofield MD   Date:    03/16/2020   Time:    14:01       Upper Extremity " Veins Left   Final Result      Negative for left upper extremity deep venous thrombosis.         Electronically signed by: Mike Martinez MD   Date:    03/15/2020   Time:    10:59      X-Ray Chest AP Portable   Final Result      1. Slightly increasing airspace disease in the left upper lobe when compared to March 10, with slightly increasing small left pleural effusion.  Additional stable findings as described.  Findings could be on the basis of pulmonary edema or pneumonia.         Electronically signed by: Dixon Thomas MD   Date:    03/13/2020   Time:    15:08      X-Ray Chest AP Portable   Final Result      1.  Findings of mild pulmonary vascular congestion/edema with trace bilateral pleural effusions.      2.  Right-sided IJ central venous catheter, which takes a hairpin turn at its distal tip at the level of the SVC.         Electronically signed by: Rashi Wilder MD   Date:    03/10/2020   Time:    17:43          Inpatient medications  Scheduled Meds:   cefTRIAXone (ROCEPHIN) IVPB  1 g Intravenous Q24H    diphenhydrAMINE  6.25 mg Intravenous Q6H    diphenoxylate-atropine 2.5-0.025 mg  1 tablet Oral QID    dronabinoL  2.5 mg Oral BID AC    epoetin nica-ebpx (RETACRIT) injection  100 Units/kg Subcutaneous Q7 Days    levothyroxine  88 mcg Oral Before breakfast    loperamide  4 mg Oral QID    magnesium oxide  400 mg Oral BID    pantoprazole  40 mg Intravenous BID    psyllium husk (with sugar)  2 packet Oral TID    sertraline  50 mg Oral Daily    sodium chloride  1 spray Each Nostril Daily     Continuous Infusions:   dextrose 10 % in water (D10W)      TPN ADULT CENTRAL LINE CUSTOM (3 in 1) 50 mL/hr at 03/16/20 1659     PRN Meds:.sodium chloride, acetaminophen, butalbital-acetaminophen-caffeine -40 mg, calcium chloride IVPB, calcium chloride IVPB, calcium chloride IVPB, calcium chloride IVPB, calcium chloride IVPB, calcium chloride IVPB, dextrose 10 % in water (D10W), dextrose 50%, dextrose  50%, glucagon (human recombinant), glucose, glucose, HYDROcodone-acetaminophen, hyoscyamine, magnesium oxide, magnesium oxide, magnesium sulfate IVPB, magnesium sulfate IVPB, magnesium sulfate IVPB, magnesium sulfate IVPB, magnesium sulfate IVPB, magnesium sulfate IVPB, magnesium sulfate IVPB, magnesium sulfate IVPB, melatonin, ondansetron, potassium chloride in water, potassium chloride in water, potassium chloride in water, potassium chloride in water, potassium chloride in water, potassium chloride in water, potassium chloride in water, potassium chloride in water, potassium chloride, potassium chloride, potassium chloride, potassium chloride, potassium chloride, potassium chloride, potassium chloride, potassium chloride, sodium chloride 0.9%, sodium phosphate IVPB, sodium phosphate IVPB, sodium phosphate IVPB, sodium phosphate IVPB, sodium phosphate IVPB, sodium phosphate IVPB, sodium phosphate IVPB, sodium phosphate IVPB, sodium phosphate IVPB, sodium phosphate IVPB      Assessment & Plan:   Shara Hutchins is a 69 y.o. female admitted for    Malnutrition  Continue TPN until ostomy output decreased  Trial of discontinue octreotide drip  Discussing with sw for octreotide IM monthly  On TPN for nutrition support with suspected short gut syndrome  Will need reversal of ostomy with General surgery in several weeks  Awaiting for LTAC placement  PT/OT  Unable to give chemical DVT prophylaxis due to thrombocytopenia  S/p 1 PRBC. On epo    Urinary retension  UCx G- estelle. Started on rocephin  On lee  Urology eval appreciated    DARCI/CKD III  Renal function improved  Monitor urine output  Nephrology on board    Unstageable pressure injury of skin and tissue  Wound care ordered    Anemia of chronic disease  Transfuse 1 unit of prbc's  ADAM weekly 100 units/kg SQ.                     Core measures:  -DNR  - DVT PPx: bilateral SCDs   - lines/ tubes: iliostomy, Lees cath       Discharge Planning:   Awaiting for LTAC  placement  .       Above encounter included review of the medical records, interviewing and examining the patient face-to-face, discussion with family and other health care providers, ordering and interpreting lab/test results and formulating a plan of care.     Medical Decision Making:      [] Low Complexity  [x] Moderate Complexity  [] High Complexity

## 2020-03-17 NOTE — PLAN OF CARE
Problem: Physical Therapy Goal  Goal: Physical Therapy Goal  Description  Goals to be met by: D/C    Patient will increase functional independence with mobility by performin. Supine to sit with Modified [place]  2. Sit to stand transfer with Stand-by Assistance  3. Gait  x 100 feet with Stand-by Assistance using Rolling Walker.      Outcome: Ongoing, Progressing

## 2020-03-17 NOTE — NURSING
"Pt transferred via stretcher with nurse, Lorraine CAREY, and arrived on floor at 18:33. Pt transferred to unit on 4L/NC. Iris RT, at bedside asked pt if she would like to stay on N/C since she is stable at this time; Pt responded "please" and denies SOB. Remains on 4L/NC at this time. VSS. Pt denies any acute distress. Reports she would like a pitcher of ice water and beef broth.   "

## 2020-03-18 PROBLEM — N17.9 ACUTE RENAL FAILURE SUPERIMPOSED ON STAGE 5 CHRONIC KIDNEY DISEASE, NOT ON CHRONIC DIALYSIS: Status: RESOLVED | Noted: 2020-02-10 | Resolved: 2020-03-18

## 2020-03-18 PROBLEM — J96.01 ACUTE HYPOXEMIC RESPIRATORY FAILURE: Status: ACTIVE | Noted: 2020-03-18

## 2020-03-18 PROBLEM — N18.5 ACUTE RENAL FAILURE SUPERIMPOSED ON STAGE 5 CHRONIC KIDNEY DISEASE, NOT ON CHRONIC DIALYSIS: Status: RESOLVED | Noted: 2020-02-10 | Resolved: 2020-03-18

## 2020-03-18 PROBLEM — D69.6 THROMBOCYTOPENIA, UNSPECIFIED: Status: RESOLVED | Noted: 2020-03-07 | Resolved: 2020-03-18

## 2020-03-18 LAB
ALBUMIN SERPL BCP-MCNC: 2 G/DL (ref 3.5–5.2)
ALP SERPL-CCNC: 160 U/L (ref 55–135)
ALT SERPL W/O P-5'-P-CCNC: 9 U/L (ref 10–44)
ANION GAP SERPL CALC-SCNC: 7 MMOL/L (ref 8–16)
AST SERPL-CCNC: 17 U/L (ref 10–40)
BILIRUB SERPL-MCNC: 1.4 MG/DL (ref 0.1–1)
BUN SERPL-MCNC: 28 MG/DL (ref 8–23)
CALCIUM SERPL-MCNC: 8.1 MG/DL (ref 8.7–10.5)
CHLORIDE SERPL-SCNC: 101 MMOL/L (ref 95–110)
CO2 SERPL-SCNC: 29 MMOL/L (ref 23–29)
CREAT SERPL-MCNC: 1.8 MG/DL (ref 0.5–1.4)
ERYTHROCYTE [DISTWIDTH] IN BLOOD BY AUTOMATED COUNT: 16.7 % (ref 11.5–14.5)
EST. GFR  (AFRICAN AMERICAN): 32.6 ML/MIN/1.73 M^2
EST. GFR  (NON AFRICAN AMERICAN): 28.3 ML/MIN/1.73 M^2
GLUCOSE SERPL-MCNC: 131 MG/DL (ref 70–110)
GLUCOSE SERPL-MCNC: 135 MG/DL (ref 70–110)
GLUCOSE SERPL-MCNC: 141 MG/DL (ref 70–110)
GLUCOSE SERPL-MCNC: 144 MG/DL (ref 70–110)
HCT VFR BLD AUTO: 23.8 % (ref 37–48.5)
HGB BLD-MCNC: 7.5 G/DL (ref 12–16)
MAGNESIUM SERPL-MCNC: 2.3 MG/DL (ref 1.6–2.6)
MCH RBC QN AUTO: 30.5 PG (ref 27–31)
MCHC RBC AUTO-ENTMCNC: 31.5 G/DL (ref 32–36)
MCV RBC AUTO: 97 FL (ref 82–98)
PHOSPHATE SERPL-MCNC: 4.1 MG/DL (ref 2.7–4.5)
PLATELET # BLD AUTO: 231 K/UL (ref 150–350)
PMV BLD AUTO: 11.9 FL (ref 9.2–12.9)
POTASSIUM SERPL-SCNC: 3.5 MMOL/L (ref 3.5–5.1)
PROT SERPL-MCNC: 5 G/DL (ref 6–8.4)
RBC # BLD AUTO: 2.46 M/UL (ref 4–5.4)
SODIUM SERPL-SCNC: 137 MMOL/L (ref 136–145)
WBC # BLD AUTO: 11.19 K/UL (ref 3.9–12.7)

## 2020-03-18 PROCEDURE — A4217 STERILE WATER/SALINE, 500 ML: HCPCS | Performed by: INTERNAL MEDICINE

## 2020-03-18 PROCEDURE — 25000003 PHARM REV CODE 250: Performed by: INTERNAL MEDICINE

## 2020-03-18 PROCEDURE — 94660 CPAP INITIATION&MGMT: CPT

## 2020-03-18 PROCEDURE — 83735 ASSAY OF MAGNESIUM: CPT

## 2020-03-18 PROCEDURE — 84100 ASSAY OF PHOSPHORUS: CPT

## 2020-03-18 PROCEDURE — C9113 INJ PANTOPRAZOLE SODIUM, VIA: HCPCS | Performed by: INTERNAL MEDICINE

## 2020-03-18 PROCEDURE — B4185 PARENTERAL SOL 10 GM LIPIDS: HCPCS | Performed by: INTERNAL MEDICINE

## 2020-03-18 PROCEDURE — 63600175 PHARM REV CODE 636 W HCPCS: Performed by: INTERNAL MEDICINE

## 2020-03-18 PROCEDURE — 63600175 PHARM REV CODE 636 W HCPCS: Performed by: FAMILY MEDICINE

## 2020-03-18 PROCEDURE — 80053 COMPREHEN METABOLIC PANEL: CPT

## 2020-03-18 PROCEDURE — 94640 AIRWAY INHALATION TREATMENT: CPT

## 2020-03-18 PROCEDURE — 97110 THERAPEUTIC EXERCISES: CPT

## 2020-03-18 PROCEDURE — 94761 N-INVAS EAR/PLS OXIMETRY MLT: CPT

## 2020-03-18 PROCEDURE — 99222 1ST HOSP IP/OBS MODERATE 55: CPT | Mod: ,,, | Performed by: INTERNAL MEDICINE

## 2020-03-18 PROCEDURE — 99900035 HC TECH TIME PER 15 MIN (STAT)

## 2020-03-18 PROCEDURE — 25000003 PHARM REV CODE 250

## 2020-03-18 PROCEDURE — 99222 PR INITIAL HOSPITAL CARE,LEVL II: ICD-10-PCS | Mod: ,,, | Performed by: INTERNAL MEDICINE

## 2020-03-18 PROCEDURE — 97530 THERAPEUTIC ACTIVITIES: CPT | Mod: CQ

## 2020-03-18 PROCEDURE — 25000003 PHARM REV CODE 250: Performed by: FAMILY MEDICINE

## 2020-03-18 PROCEDURE — 27000221 HC OXYGEN, UP TO 24 HOURS

## 2020-03-18 PROCEDURE — 21000000 HC CCU ICU ROOM CHARGE

## 2020-03-18 PROCEDURE — 85027 COMPLETE CBC AUTOMATED: CPT

## 2020-03-18 PROCEDURE — 25000242 PHARM REV CODE 250 ALT 637 W/ HCPCS: Performed by: INTERNAL MEDICINE

## 2020-03-18 RX ORDER — ENOXAPARIN SODIUM 100 MG/ML
30 INJECTION SUBCUTANEOUS
Status: DISCONTINUED | OUTPATIENT
Start: 2020-03-18 | End: 2020-03-24

## 2020-03-18 RX ORDER — METOPROLOL TARTRATE 1 MG/ML
5 INJECTION, SOLUTION INTRAVENOUS EVERY 5 MIN PRN
Status: COMPLETED | OUTPATIENT
Start: 2020-03-19 | End: 2020-03-18

## 2020-03-18 RX ORDER — METOPROLOL TARTRATE 1 MG/ML
INJECTION, SOLUTION INTRAVENOUS
Status: COMPLETED
Start: 2020-03-18 | End: 2020-03-18

## 2020-03-18 RX ORDER — CYCLOBENZAPRINE HCL 5 MG
5 TABLET ORAL 3 TIMES DAILY PRN
Status: DISCONTINUED | OUTPATIENT
Start: 2020-03-18 | End: 2020-03-27 | Stop reason: HOSPADM

## 2020-03-18 RX ORDER — FUROSEMIDE 10 MG/ML
20 INJECTION INTRAMUSCULAR; INTRAVENOUS ONCE
Status: COMPLETED | OUTPATIENT
Start: 2020-03-18 | End: 2020-03-18

## 2020-03-18 RX ORDER — ALBUTEROL SULFATE 90 UG/1
2 AEROSOL, METERED RESPIRATORY (INHALATION) EVERY 8 HOURS
Status: DISCONTINUED | OUTPATIENT
Start: 2020-03-18 | End: 2020-03-20

## 2020-03-18 RX ADMIN — METOPROLOL TARTRATE 5 MG: 1 INJECTION, SOLUTION INTRAVENOUS at 11:03

## 2020-03-18 RX ADMIN — LEVOTHYROXINE SODIUM 88 MCG: 88 TABLET ORAL at 05:03

## 2020-03-18 RX ADMIN — DRONABINOL 2.5 MG: 2.5 CAPSULE ORAL at 05:03

## 2020-03-18 RX ADMIN — BUTALBITAL, ACETAMINOPHEN AND CAFFEINE 1 TABLET: 50; 325; 40 TABLET ORAL at 10:03

## 2020-03-18 RX ADMIN — LOPERAMIDE HYDROCHLORIDE 4 MG: 2 CAPSULE ORAL at 10:03

## 2020-03-18 RX ADMIN — LOPERAMIDE HYDROCHLORIDE 4 MG: 2 CAPSULE ORAL at 09:03

## 2020-03-18 RX ADMIN — DIPHENHYDRAMINE HYDROCHLORIDE 6.25 MG: 50 INJECTION INTRAMUSCULAR; INTRAVENOUS at 05:03

## 2020-03-18 RX ADMIN — MAGNESIUM OXIDE 400 MG: 400 TABLET ORAL at 09:03

## 2020-03-18 RX ADMIN — CYCLOBENZAPRINE HYDROCHLORIDE 5 MG: 5 TABLET, FILM COATED ORAL at 05:03

## 2020-03-18 RX ADMIN — PSYLLIUM HUSK 2 PACKET: 3.4 POWDER ORAL at 05:03

## 2020-03-18 RX ADMIN — BUTALBITAL, ACETAMINOPHEN AND CAFFEINE 1 TABLET: 50; 325; 40 TABLET ORAL at 05:03

## 2020-03-18 RX ADMIN — ENOXAPARIN SODIUM 30 MG: 100 INJECTION SUBCUTANEOUS at 05:03

## 2020-03-18 RX ADMIN — PSYLLIUM HUSK 2 PACKET: 3.4 POWDER ORAL at 09:03

## 2020-03-18 RX ADMIN — CYCLOBENZAPRINE HYDROCHLORIDE 5 MG: 5 TABLET, FILM COATED ORAL at 11:03

## 2020-03-18 RX ADMIN — DIPHENHYDRAMINE HYDROCHLORIDE 6.25 MG: 50 INJECTION INTRAMUSCULAR; INTRAVENOUS at 11:03

## 2020-03-18 RX ADMIN — DIPHENOXYLATE HYDROCHLORIDE AND ATROPINE SULFATE 1 TABLET: 2.5; .025 TABLET ORAL at 01:03

## 2020-03-18 RX ADMIN — PANTOPRAZOLE SODIUM 40 MG: 40 INJECTION, POWDER, LYOPHILIZED, FOR SOLUTION INTRAVENOUS at 09:03

## 2020-03-18 RX ADMIN — DIPHENOXYLATE HYDROCHLORIDE AND ATROPINE SULFATE 1 TABLET: 2.5; .025 TABLET ORAL at 09:03

## 2020-03-18 RX ADMIN — DIPHENHYDRAMINE HYDROCHLORIDE 6.25 MG: 50 INJECTION INTRAMUSCULAR; INTRAVENOUS at 01:03

## 2020-03-18 RX ADMIN — SALINE NASAL SPRAY 1 SPRAY: 1.5 SOLUTION NASAL at 01:03

## 2020-03-18 RX ADMIN — SERTRALINE HYDROCHLORIDE 50 MG: 50 TABLET ORAL at 09:03

## 2020-03-18 RX ADMIN — ALBUTEROL SULFATE 2 PUFF: 90 AEROSOL, METERED RESPIRATORY (INHALATION) at 04:03

## 2020-03-18 RX ADMIN — SMOFLIPID: 6; 6; 5; 3 INJECTION, EMULSION INTRAVENOUS at 05:03

## 2020-03-18 RX ADMIN — LOPERAMIDE HYDROCHLORIDE 4 MG: 2 CAPSULE ORAL at 05:03

## 2020-03-18 RX ADMIN — DIPHENOXYLATE HYDROCHLORIDE AND ATROPINE SULFATE 1 TABLET: 2.5; .025 TABLET ORAL at 05:03

## 2020-03-18 RX ADMIN — DIPHENHYDRAMINE HYDROCHLORIDE 6.25 MG: 50 INJECTION INTRAMUSCULAR; INTRAVENOUS at 12:03

## 2020-03-18 RX ADMIN — FUROSEMIDE 20 MG: 10 INJECTION, SOLUTION INTRAMUSCULAR; INTRAVENOUS at 05:03

## 2020-03-18 RX ADMIN — CEFTRIAXONE SODIUM 1 G: 1 INJECTION, POWDER, FOR SOLUTION INTRAMUSCULAR; INTRAVENOUS at 01:03

## 2020-03-18 NOTE — PLAN OF CARE
Problem: Parenteral Nutrition  Goal: Effective Intravenous Nutrition Therapy Delivery  Outcome: Ongoing, Progressing  Intervention: Optimize Intravenous Nutrition Delivery  Flowsheets (Taken 3/18/2020 1421)  Nutrition Support Management: parenteral nutrition composition adjusted

## 2020-03-18 NOTE — PLAN OF CARE
"   03/18/20 0803   Discharge Reassessment   Assessment Type Discharge Planning Reassessment   Anticipated Discharge Disposition LTAC   Discharge Plan A Long-term acute care facility (LTAC)   Discharge Plan B Skilled Nursing Facility   Post-Acute Status   Post-Acute Authorization Placement   Discharge Delays (!) Change in Medical Condition      [facility] following up on placement this date. Pt was denied by the following SNFs yesterday: [facility] in [place], [facility], [facility] all due to TPN.     Pt was previously authorized by [facility] for LTAC from 3/10-3/17 and was accepted by [facility], but they could not work out a contract with [facility]. Now that pt is on telemetry, [facility] will be reaching back out to [facility] to see if the rate of pay would be higher. [facility] resubmitted referral packets to both [facility] and [facility] yesterday so new auth can be processed. SW received a call from Basia at [facility] ([phone]) stating that per their medical review, they are "not approving [LTAC] for now" as pt is "not stable for transfer" due to respirations in the 50s and increased oxygen needs. They will be reaching back out to [facility].    12:01 [facility] received denial letter from [facility] based on the reasons above and sent a secure chat to Dr. Griffin regarding pt's medical condition and how to proceed.    15:56 [facility] followed up with pt and provided her with denial letter. [facility] explained that both SNF and LTAC for the time being. Pt understands. [facility] contacted Abigail at [facility] who denied pt yesterday for SNF due to TPN. [facility] explained pt is to come off TPN per Dr. Griffin and asked Nadira to re-evaluate. Nadira stated she still had the referral and would do so. [facility] also spoke with Kalin Cannon ([phone]) from [facility], who asked [facility] to send a new referral once pt is off TPN. [facility] sent referrals to [facility], " [facility], [facility], and [facility] in [facility].

## 2020-03-18 NOTE — PROGRESS NOTES
Former[facility] Medicine Progress Note    Patient Name: Shara Hutchins MRN: [id number]   Patient Class: IP- Inpatient  Length of Stay: 9   Admission Date: 3/7/2020  8:42 PM Attending Physician: Digna Griffin MD   Primary Care Provider: April Horton MD Face-to-Face encounter date: 03/18/2020   Chief Complaint: No chief complaint on file.        Subjective:    Hospital course:  Patient was admitted for severe dehydration and malnutrition with 20 lb weight loss at home due to high output from the ileostomy.  Patient was started on TPN and IV fluids.  Patient continued to have high output to the ileostomy with multiple anti-diarrheal agents.  Patient was started on octreotide with improved diarrhea.  Patient's symptoms improved during hospitalization.  Patient was tolerating diet and had improved nutrition.  Octreotide was discontinued however patient started having watery diarrhea and increased output.  Discussed with surgery who has concerns for short gut syndrome.  Plan for reversal of ileostomy in several weeks.  Adjusted patient's antidiarrhea medication with improved output from the ostomy.  Discontinue octreotide.  Clinical course complicated by recurrent DARCI and third-spacing causing pitting edema.  3/16:  Has increased shortness of breath.  Requiring 2 L nasal cannula saturating 94%.  Continues to have pitting edema and upper and lower extremities.  Suspect 3rd spacing.  DVT negative on ultrasound.  Stool is more formed today.  Continues to be off octreotide.  Transfuse 1 PRBC yesterday.  Started on EPO per Nephrology.  IV Lasix ordered.  Has good urine output.  Afebrile the past 24 hr.  Urine culture grew gram-negative estelle greater than 100,000.  Rocephin started.  Awaiting for LTAC placement.    3/17:Continued to be hypoxic saturating 88-90% on 4 l oxy   Overnight patient had acute urinary retention, Godwin changed.  Continued to have hematuria,with occasional clots, slowly Hb trending  "down    Interval History: .    Patient primarily worried about headache and neck pain  Also has loss of appetite  Requesting flexril and appetite stimulant-she is already on  pulm eval appreciated  Reports formed stool  Monitor CBC transfuse prn    Review of Systems All other Review of Systems were found to be negative expect for that mentioned already in HPI.     Objective:   Physical Exam  BP (!) 145/67   Pulse 91   Temp 99 °F (37.2 °C) (Oral)   Resp 16   Ht 5' 7.5" (1.715 m)   Wt 63.5 kg (139 lb 15.9 oz)   SpO2 (!) 92%   Breastfeeding? No   BMI 21.60 kg/m²   Vitals reviewed.    Constitutional: No acute distress.   HENT: Atraumatic. PERRLA  Cardiovascular: Normal rate, regular rhythm and normal heart sounds.   Pulmonary/Chest: Effort normal. She has no wheezes blt rales. S/p port   Abdominal: Soft. no tenderness,no distension,bowel sounds are normal  S/p ostomy ,lee with hematuria  Neurological: AOX3  Skin: Skin is warm and dry.     Following labs were Reviewed   Recent Labs   Lab 03/18/20  0453   WBC 11.19   HGB 7.5*   HCT 23.8*      CALCIUM 8.1*   ALBUMIN 2.0*   PROT 5.0*      K 3.5   CO2 29      BUN 28*   CREATININE 1.8*   ALKPHOS 160*   ALT 9*   AST 17   BILITOT 1.4*     No results found for: POCTGLUCOSE     Microbiology Results (last 7 days)     Procedure Component Value Units Date/Time    Urine Culture High Risk [[id number]]  (Abnormal)  (Susceptibility) Collected:  03/15/20 6680    Order Status:  Completed Specimen:  Urine, Catheterized Updated:  03/17/20 0632     Urine Culture, Routine KLEBSIELLA PNEUMONIAE  >100,000 cfu/ml      Narrative:       Indicated criteria for high risk culture:->Other  Other (specify):->sick        X-Ray Chest AP Portable   Final Result      No significant interval change in diffuse interstitial opacities, potentially reflecting acute congestive failure and or hypervolemia, viral or atypical pneumonia, and or developing ARDS.         Electronically " signed by: Mike Martinez MD   Date:    03/18/2020   Time:    15:49      X-Ray Chest AP Portable   Final Result      Increasing diffuse bilateral lung opacity consistent with pulmonary edema, ARDS, or diffuse pneumonia         Electronically signed by: Maykel Schofield MD   Date:    03/16/2020   Time:    14:01      US Upper Extremity Veins Left   Final Result      Negative for left upper extremity deep venous thrombosis.         Electronically signed by: Mike Martinez MD   Date:    03/15/2020   Time:    10:59      X-Ray Chest AP Portable   Final Result      1. Slightly increasing airspace disease in the left upper lobe when compared to March 10, with slightly increasing small left pleural effusion.  Additional stable findings as described.  Findings could be on the basis of pulmonary edema or pneumonia.         Electronically signed by: Dixon Thomas MD   Date:    03/13/2020   Time:    15:08      X-Ray Chest AP Portable   Final Result      1.  Findings of mild pulmonary vascular congestion/edema with trace bilateral pleural effusions.      2.  Right-sided IJ central venous catheter, which takes a hairpin turn at its distal tip at the level of the SVC.         Electronically signed by: Rashi Wilder MD   Date:    03/10/2020   Time:    17:43          Inpatient medications  Scheduled Meds:   albuterol  2 puff Inhalation Q8H    cefTRIAXone (ROCEPHIN) IVPB  1 g Intravenous Q24H    diphenhydrAMINE  6.25 mg Intravenous Q6H    diphenoxylate-atropine 2.5-0.025 mg  1 tablet Oral QID    dronabinoL  2.5 mg Oral BID AC    enoxparin  30 mg Subcutaneous Q24H    epoetin nica-ebpx (RETACRIT) injection  100 Units/kg Subcutaneous Q7 Days    furosemide  20 mg Intravenous Once    levothyroxine  88 mcg Oral Before breakfast    loperamide  4 mg Oral QID    magnesium oxide  400 mg Oral BID    pantoprazole  40 mg Intravenous BID    psyllium husk (with sugar)  2 packet Oral TID    sertraline  50 mg Oral Daily    sodium chloride   1 spray Each Nostril Daily     Continuous Infusions:   dextrose 10 % in water (D10W)      TPN ADULT CENTRAL LINE CUSTOM (3 in 1) 50 mL/hr at 03/17/20 1959    TPN ADULT CENTRAL LINE CUSTOM (3 in 1)       PRN Meds:.sodium chloride, acetaminophen, butalbital-acetaminophen-caffeine -40 mg, calcium chloride IVPB, calcium chloride IVPB, calcium chloride IVPB, calcium chloride IVPB, calcium chloride IVPB, calcium chloride IVPB, dextrose 10 % in water (D10W), dextrose 50%, dextrose 50%, glucagon (human recombinant), glucose, glucose, HYDROcodone-acetaminophen, hyoscyamine, magnesium oxide, magnesium oxide, magnesium sulfate IVPB, magnesium sulfate IVPB, magnesium sulfate IVPB, magnesium sulfate IVPB, magnesium sulfate IVPB, magnesium sulfate IVPB, magnesium sulfate IVPB, magnesium sulfate IVPB, melatonin, ondansetron, potassium chloride in water, potassium chloride in water, potassium chloride in water, potassium chloride in water, potassium chloride in water, potassium chloride in water, potassium chloride in water, potassium chloride in water, potassium chloride, potassium chloride, potassium chloride, potassium chloride, potassium chloride, potassium chloride, potassium chloride, potassium chloride, sodium chloride 0.9%, sodium phosphate IVPB, sodium phosphate IVPB, sodium phosphate IVPB, sodium phosphate IVPB, sodium phosphate IVPB, sodium phosphate IVPB, sodium phosphate IVPB, sodium phosphate IVPB, sodium phosphate IVPB, sodium phosphate IVPB      Assessment & Plan:   Shara Hutchins is a 69 y.o. female admitted for    Malnutrition  Continue TPN until ostomy output decreased  Trial of discontinue octreotide drip  On TPN for nutrition support with suspected short gut syndrome  Will need reversal of ostomy with General surgery in several weeks  Awaiting for LTAC placement  PT/OT  Resolved  thrombocytopenia  S/p 1 PRBC. On epo    Urinary retension with UTI  UCx -Klebsilla on rocephin  On lee  Urology eval  appreciated    DARCI/CKD III  Renal function improved  Monitor urine output  Nephrology on board    Unstageable pressure injury of skin and tissue  Wound care ordered    Anemia of chronic disease  Transfuse 1 unit of prbc's  ADAM weekly 100 units/kg SQ.                     Core measures:  -DNR  - DVT PPx: bilateral SCDs   - lines/ tubes: iliostomy, Godwins cath       Discharge Planning:   Awaiting for LTAC placement  .       Above encounter included review of the medical records, interviewing and examining the patient face-to-face, discussion with family and other health care providers, ordering and interpreting lab/test results and formulating a plan of care.     Medical Decision Making:      [] Low Complexity  [x] Moderate Complexity  [] High Complexity

## 2020-03-18 NOTE — HPI
"68 yo female initially admitted for "dumping syndrome", malnutrition and dehydration.   Has been being cared for and is improving, osotomy has some substance to her stool, has noted over the last few days that she has had some increased SOB, ALLRED, + orthopnea, + edema.  She has some cough and congestion and needs 2-4 LPM.  She has not been able to produce any sputum.  She denies any chest or palpitations.  She denies any h/o COPD but has about a 30 pack year history of smoking but quit about 20 years ago.  She denies any h/o chornic cardiac disease.  She denies any acute change in her status and has had no pleuritic chest pain.  "

## 2020-03-18 NOTE — CONSULTS
"[facility]  Pulmonology  Consult Note    Patient Name: Shara Hutchins  MRN: [id number]  Admission Date: 3/7/2020  Hospital Length of Stay: 9 days  Code Status: DNR  Attending Physician: Digna Griffin MD  Primary Care Provider: April Horton MD   Principal Problem: <principal problem not specified>    Inpatient consult to Pulmonology  Consult performed by: Moo Maharaj MD  Consult ordered by: Digna Griffin MD        Subjective:     HPI:  70 yo female initially admitted for "dumping syndrome", malnutrition and dehydration.   Has been being cared for and is improving, osotomy has some substance to her stool, has noted over the last few days that she has had some increased SOB, ALLRED, + orthopnea, + edema.  She has some cough and congestion and needs 2-4 LPM.  She has not been able to produce any sputum.  She denies any chest or palpitations.  She denies any h/o COPD but has about a 30 pack year history of smoking but quit about 20 years ago.  She denies any h/o chornic cardiac disease.  She denies any acute change in her status and has had no pleuritic chest pain.    Past Medical History:   Diagnosis Date    Chronic kidney disease, stage III (moderate) 1/7/2018    Diabetes mellitus, type 2     Fatigue     Hypertension     Iron deficiency anemia due to chronic blood loss 1/7/2018    Vulvar cancer 1/7/2019       Past Surgical History:   Procedure Laterality Date    ABDOMINAL SURGERY      BACK SURGERY      carpel tunnel surgery once on each hand      CERVICAL FUSION      x 4    cervix repair      CHOLECYSTECTOMY  2000    DIAGNOSTIC LAPAROSCOPY N/A 2/13/2020    Procedure: LAPAROSCOPY, DIAGNOSTIC;  Surgeon: Robbi Lovell III, MD;  Location: [facility] OR;  Service: General;  Laterality: N/A;    HYSTERECTOMY  1980    ILEOSTOMY N/A 2/13/2020    Procedure: CREATION, ILEOSTOMY Loop;  Surgeon: Robbi Lovell III, MD;  Location: [facility] OR;  Service: General;  Laterality: N/A;    LUMBAR " LAMINECTOMY      LYSIS OF ADHESIONS N/A 2020    Procedure: LYSIS, ADHESIONS;  Surgeon: Robbi Lovell III, MD;  Location: [facility] OR;  Service: General;  Laterality: N/A;    NECK SURGERY      PHLEBOGRAPHY  2020    Procedure: VENOGRAM;  Surgeon: Robbi Lovell III, MD;  Location: [facility] OR;  Service: General;;    REMOVAL OF VASCULAR ACCESS PORT Right 2020    Procedure: REMOVAL, VASCULAR ACCESS PORT;  Surgeon: Robbi Lovell III, MD;  Location: [facility] OR;  Service: General;  Laterality: Right;    SHOULDER SURGERY      vulva cancer         Review of patient's allergies indicates:   Allergen Reactions    Ciprofloxacin Other (See Comments)     Elevated liver enzymes      Pcn [penicillins] Anaphylaxis       Family History     Problem Relation (Age of Onset)    Cancer Mother    Heart disease Mother, Father, Sister    Hypertension Mother    No Known Problems Daughter        Tobacco Use    Smoking status: Former Smoker     Packs/day: 1.00     Years: 33.00     Pack years: 33.00     Types: Cigarettes     Last attempt to quit: 2000     Years since quittin.6    Smokeless tobacco: Never Used   Substance and Sexual Activity    Alcohol use: No    Drug use: No    Sexual activity: Not on file         Review of Systems   Constitutional: Positive for activity change, appetite change, fatigue and unexpected weight change. Negative for chills and fever.   HENT: Negative for congestion, hearing loss, nosebleeds, postnasal drip, sneezing and sore throat.    Respiratory: Positive for cough and shortness of breath. Negative for apnea, choking, chest tightness, wheezing and stridor.    Cardiovascular: Positive for leg swelling. Negative for chest pain and palpitations.   Gastrointestinal: Positive for diarrhea. Negative for abdominal distention, abdominal pain, blood in stool, constipation and nausea.        + ostomy   Genitourinary: Positive for hematuria (? lee trauma). Negative for dysuria,  frequency and urgency.   Musculoskeletal: Positive for neck pain. Negative for arthralgias and myalgias.   Neurological: Positive for weakness and headaches. Negative for syncope and numbness.   Hematological: Negative for adenopathy.   Psychiatric/Behavioral: Negative for dysphoric mood and suicidal ideas. The patient is not nervous/anxious.      Objective:     Vital Signs (Most Recent):  Temp: 98.9 °F (37.2 °C) (03/18/20 1156)  Pulse: 96 (03/18/20 1156)  Resp: (!) 27 (03/18/20 1156)  BP: (!) 157/67 (03/18/20 1156)  SpO2: (!) 88 % (03/18/20 1156) Vital Signs (24h Range):  Temp:  [98.4 °F (36.9 °C)-99.7 °F (37.6 °C)] 98.9 °F (37.2 °C)  Pulse:  [] 96  Resp:  [25-52] 27  SpO2:  [88 %-94 %] 88 %  BP: (119-160)/(57-73) 157/67     Weight: 63.5 kg (139 lb 15.9 oz)  Body mass index is 21.6 kg/m².      Intake/Output Summary (Last 24 hours) at 3/18/2020 1507  Last data filed at 3/18/2020 1209  Gross per 24 hour   Intake 1760 ml   Output 6100 ml   Net -4340 ml       Physical Exam   Constitutional: She is oriented to person, place, and time. She appears well-developed and well-nourished. No distress.   Chronically ill female, NAD, AAO x 3  Breathing comfortably   HENT:   Head: Normocephalic and atraumatic.   Right Ear: External ear normal.   Left Ear: External ear normal.   Nose: Nose normal.   Mouth/Throat: Oropharynx is clear and moist.   Eyes: Pupils are equal, round, and reactive to light. Conjunctivae and EOM are normal.   Neck: Normal range of motion. Neck supple. No JVD present. No tracheal deviation present. No thyromegaly present.   Cardiovascular: Normal rate, regular rhythm, normal heart sounds and intact distal pulses. Exam reveals no gallop and no friction rub.   No murmur heard.  Pulmonary/Chest: Effort normal. No stridor. No respiratory distress. She has no wheezes. She has rales. She exhibits no tenderness.   Decreased at bases  No acc m use   Abdominal: Soft. Bowel sounds are normal. She exhibits no  distension. There is no tenderness. There is no rebound and no guarding.   + ostomy   Musculoskeletal: Normal range of motion. She exhibits edema (dependent). She exhibits no tenderness.   Lymphadenopathy:     She has no cervical adenopathy.   Neurological: She is alert and oriented to person, place, and time. No cranial nerve deficit.   Skin: Skin is warm and dry. She is not diaphoretic.   Psychiatric: She has a normal mood and affect. Her behavior is normal.   Nursing note and vitals reviewed.      Vents:  Oxygen Concentration (%): 45 (03/17/20 2251)    Lines/Drains/Airways     Peripherally Inserted Central Catheter Line            PICC Double Lumen 03/06/20 1154 other (see comments) 12 days          Drain                 Hemodialysis AV Fistula Left upper arm -- days         Ileostomy 02/13/20 1600 Loop RLQ 33 days         Urethral Catheter 03/16/20 2230 Straight-tip 18 Fr. 1 day                Significant Labs:    CBC/Anemia Profile:  Recent Labs   Lab 03/16/20  1551 03/17/20  0423 03/18/20  0453   WBC  --  11.14 11.19   HGB  --  7.9* 7.5*   HCT 24* 24.8* 23.8*   PLT  --  184 231   MCV  --  99* 97   RDW  --  17.2* 16.7*        Chemistries:  Recent Labs   Lab 03/17/20  0423 03/17/20  1251 03/18/20  0453    138 137   K 4.6 3.8 3.5    102 101   CO2 25 26 29   BUN 38* 34* 28*   CREATININE 1.8* 1.8* 1.8*   CALCIUM 7.9* 8.2* 8.1*   ALBUMIN 2.1* 2.1* 2.0*   PROT 4.9* 5.0* 5.0*   BILITOT 1.1* 1.2* 1.4*   ALKPHOS 169* 167* 160*   ALT 10 11 9*   AST 19 19 17   MG 3.3* 2.2 2.3   PHOS 5.5* 3.7 4.1       All pertinent labs within the past 24 hours have been reviewed.    Microbiology Results (last 7 days)     Procedure Component Value Units Date/Time    Urine Culture High Risk [[id number]]  (Abnormal)  (Susceptibility) Collected:  03/15/20 7710    Order Status:  Completed Specimen:  Urine, Catheterized Updated:  03/17/20 0632     Urine Culture, Routine KLEBSIELLA PNEUMONIAE  >100,000 cfu/ml      Narrative:        Indicated criteria for high risk culture:->Other  Other (specify):->sick            Significant Imaging:   I have reviewed and interpreted all pertinent imaging results/findings within the past 24 hours.     EXAMINATION:  XR CHEST AP PORTABLE    CLINICAL HISTORY:  sob;    TECHNIQUE:  Single frontal view of the chest is performed    COMPARISON:  Chest x-ray 03/13/2020, 03/10/2020, 02/28/2020 and 04/02/2013.    FINDINGS:  There is progressive increase in the bilateral lung opacity with increasing diffuse interstitial opacity and developing patchy airspace opacity.  There is blunting of the left lateral costophrenic angle consistent with left pleural effusion.    Cardiac silhouette is mildly enlarged    There is no change in position of the right-sided central line.    Fusion hardware seen in the lower cervical spine.      Impression       Increasing diffuse bilateral lung opacity consistent with pulmonary edema, ARDS, or diffuse pneumonia      Electronically signed by: Maykel Schofield MD  Date: 03/16/2020  Time: 14:01         Assessment/Plan:     Acute hypoxemic respiratory failure  · Due to pulmonary edema and volume overload  · Recheck CXR today  · Diuretics and follow   · Minimize fluids  · O2 to keep sats > 90%  · Prn albuterol  · Check weight daily    Malnutrition  · Fairly severe, follow  · On TPN but she states that she may be weaned off soon    Debility  · PT, OT as able    Unstageable pressure injury of skin and tissue  · Continue with local care    DNR (do not resuscitate)  · Aware     S/P ileostomy  · Aware     Chronic diarrhea  · S/p ostomy  · Pt reports that's stools have more substance    Stage 5 chronic kidney disease not on chronic dialysis  · Aware     Iron deficiency anemia due to chronic blood loss  · Aware, has some minor blood in lee  · Transfuse for hgb < 7    Inflammatory bowel disease  · Aware           Thank you for your consult. I will follow-up with patient. Please contact us if you have any  additional questions.     Moo Maharaj MD  Pulmonology  [facility]

## 2020-03-18 NOTE — CONSULTS
INPATIENT NEPHROLOGY CONSULT   [facility] NEPHROLOGY    Patient Name: Shara Hutchins  Date: 03/18/2020    Reason for consultation: DARCI/CKD    Chief Complaint: Diarrhea    History of Present Illness:  68 y/o F with hx of HTN, NIDDM-2, CKD-3, vulvar cancer (s/p chemo/radiation), and now s/p ileostomy after pneumatosis intestinalis earlier in 2020 and with persistent diarrhea 2/2 chemotherapy who p/w high ostomy output, treated with TPN/octreotide, awaiting LTAC, developed gross hematuria 3/13, noted to have bump in Cr and Hb drop, consulted for DARCI and anemia.    3/17 VSS, no new complains.  3/18 VSS, no new complains. UO3L, ostomy 900, not sure what blood output 1,800ml is...    Plan of Care:    1. DARCI/CKD III  - sCr seem better, baseline is 1.6  - no nsaids or IV contrast    2. Chronic volume depletion 2/2 diarrhea  - monitor ostomy output  - on TPN  - on multiple stool bulking agents, loperamide; surgery in future    3. Gross hematuria  - clearing in lee    4. Acute on chronic anemia of CKD and chronic illness  - transfused 1 unit of blood on 3/15.  - ADAM weekly 100 units/kg SQ.  - will defer further blood transfusions to primary team and GI.    5. Edema  - ok with lasix PRN    Thank you for allowing us to participate in this patient's care. We will continue to follow.    Vital Signs:  Temp Readings from Last 3 Encounters:   03/18/20 98.8 °F (37.1 °C) (Oral)   03/07/20 98.2 °F (36.8 °C) (Oral)   03/05/20 97.8 °F (36.6 °C) (Oral)       Pulse Readings from Last 3 Encounters:   03/18/20 93   03/07/20 (!) 57   03/05/20 61       BP Readings from Last 3 Encounters:   03/18/20 (!) 123/57   03/07/20 131/61   03/05/20 (!) 169/74       Weight:  Wt Readings from Last 3 Encounters:   03/08/20 63.5 kg (139 lb 15.9 oz)   03/06/20 53 kg (116 lb 13.5 oz)   03/07/20 52.6 kg (116 lb)       Past Medical & Surgical History:  Past Medical History:   Diagnosis Date    Chronic kidney disease, stage III (moderate) 1/7/2018    Diabetes  mellitus, type 2     Fatigue     Hypertension     Iron deficiency anemia due to chronic blood loss 1/7/2018    Vulvar cancer 1/7/2019       Past Surgical History:   Procedure Laterality Date    ABDOMINAL SURGERY      BACK SURGERY      carpel tunnel surgery once on each hand      CERVICAL FUSION      x 4    cervix repair      CHOLECYSTECTOMY  2000    DIAGNOSTIC LAPAROSCOPY N/A 2/13/2020    Procedure: LAPAROSCOPY, DIAGNOSTIC;  Surgeon: Robbi Lovell III, MD;  Location: [facility];  Service: General;  Laterality: N/A;    HYSTERECTOMY  1980    ILEOSTOMY N/A 2/13/2020    Procedure: CREATION, ILEOSTOMY Loop;  Surgeon: Robbi Lovell III, MD;  Location: [facility] OR;  Service: General;  Laterality: N/A;    LUMBAR LAMINECTOMY      LYSIS OF ADHESIONS N/A 2/13/2020    Procedure: LYSIS, ADHESIONS;  Surgeon: Robbi Lovell III, MD;  Location: [facility] OR;  Service: General;  Laterality: N/A;    NECK SURGERY      PHLEBOGRAPHY  2/28/2020    Procedure: VENOGRAM;  Surgeon: Robbi Lovell III, MD;  Location: [facility];  Service: General;;    REMOVAL OF VASCULAR ACCESS PORT Right 2/13/2020    Procedure: REMOVAL, VASCULAR ACCESS PORT;  Surgeon: Robbi Lovell III, MD;  Location: [facility] OR;  Service: General;  Laterality: Right;    SHOULDER SURGERY      vulva cancer         Past Social History:  Social History     Socioeconomic History    Marital status:      Spouse name: Not on file    Number of children: Not on file    Years of education: Not on file    Highest education level: Not on file   Occupational History    Not on file   Social Needs    Financial resource strain: Not on file    Food insecurity:     Worry: Not on file     Inability: Not on file    Transportation needs:     Medical: Not on file     Non-medical: Not on file   Tobacco Use    Smoking status: Former Smoker     Packs/day: 1.00     Years: 33.00     Pack years: 33.00     Types: Cigarettes     Last attempt to quit: 7/22/2000      Years since quittin.6    Smokeless tobacco: Never Used   Substance and Sexual Activity    Alcohol use: No    Drug use: No    Sexual activity: Not on file   Lifestyle    Physical activity:     Days per week: Not on file     Minutes per session: Not on file    Stress: Not on file   Relationships    Social connections:     Talks on phone: Not on file     Gets together: Not on file     Attends Sikh service: Not on file     Active member of club or organization: Not on file     Attends meetings of clubs or organizations: Not on file     Relationship status: Not on file   Other Topics Concern    Not on file   Social History Narrative    Not on file       Medications:  Scheduled Meds:   cefTRIAXone (ROCEPHIN) IVPB  1 g Intravenous Q24H    diphenhydrAMINE  6.25 mg Intravenous Q6H    diphenoxylate-atropine 2.5-0.025 mg  1 tablet Oral QID    dronabinoL  2.5 mg Oral BID AC    epoetin nica-ebpx (RETACRIT) injection  100 Units/kg Subcutaneous Q7 Days    levothyroxine  88 mcg Oral Before breakfast    loperamide  4 mg Oral QID    magnesium oxide  400 mg Oral BID    pantoprazole  40 mg Intravenous BID    psyllium husk (with sugar)  2 packet Oral TID    sertraline  50 mg Oral Daily    sodium chloride  1 spray Each Nostril Daily     Continuous Infusions:   dextrose 10 % in water (D10W)      TPN ADULT CENTRAL LINE CUSTOM (3 in 1) 50 mL/hr at 20     PRN Meds:.sodium chloride, acetaminophen, butalbital-acetaminophen-caffeine -40 mg, calcium chloride IVPB, calcium chloride IVPB, calcium chloride IVPB, calcium chloride IVPB, calcium chloride IVPB, calcium chloride IVPB, dextrose 10 % in water (D10W), dextrose 50%, dextrose 50%, glucagon (human recombinant), glucose, glucose, HYDROcodone-acetaminophen, hyoscyamine, magnesium oxide, magnesium oxide, magnesium sulfate IVPB, magnesium sulfate IVPB, magnesium sulfate IVPB, magnesium sulfate IVPB, magnesium sulfate IVPB, magnesium sulfate IVPB,  magnesium sulfate IVPB, magnesium sulfate IVPB, melatonin, ondansetron, potassium chloride in water, potassium chloride in water, potassium chloride in water, potassium chloride in water, potassium chloride in water, potassium chloride in water, potassium chloride in water, potassium chloride in water, potassium chloride, potassium chloride, potassium chloride, potassium chloride, potassium chloride, potassium chloride, potassium chloride, potassium chloride, sodium chloride 0.9%, sodium phosphate IVPB, sodium phosphate IVPB, sodium phosphate IVPB, sodium phosphate IVPB, sodium phosphate IVPB, sodium phosphate IVPB, sodium phosphate IVPB, sodium phosphate IVPB, sodium phosphate IVPB, sodium phosphate IVPB  No current facility-administered medications on file prior to encounter.      Current Outpatient Medications on File Prior to Encounter   Medication Sig Dispense Refill    acetaminophen (TYLENOL) 325 MG tablet Take 2 tablets (650 mg total) by mouth every 4 (four) hours as needed.  0    cholestyramine-aspartame (QUESTRAN LIGHT) 4 gram PwPk Take 1 packet (4 g total) by mouth 2 (two) times daily. 180 packet 3    dextrose 50% injection Inject 25 mLs (12.5 g total) into the vein as needed (between 51 - 70 mg/dL if patient cannnot take PO but has IV access.).      dextrose 50% injection Inject 50 mLs (25 g total) into the vein as needed (less than 50 mg/dL if patient cannnot take PO but has IV access.).      diphenhydrAMINE (BENADRYL) 50 mg/mL injection Inject 0.125 mLs (6.25 mg total) into the vein every 6 (six) hours.      glucose 4 GM chewable tablet Take 4 tablets (16 g total) by mouth as needed.  12    glucose 4 GM chewable tablet Take 6 tablets (24 g total) by mouth as needed.  12    HYDROcodone-acetaminophen (NORCO) 5-325 mg per tablet Take 1 tablet by mouth every 6 (six) hours as needed.  0    levothyroxine (SYNTHROID) 88 MCG tablet Take 1 tablet (88 mcg total) by mouth before breakfast. 30 tablet 11  "   melatonin (MELATIN) 3 mg tablet Take 3 tablets (9 mg total) by mouth nightly as needed for Insomnia.  0    ondansetron (ZOFRAN-ODT) 8 MG TbDL Take 1 tablet (8 mg total) by mouth every 8 (eight) hours as needed.      oxyCODONE-acetaminophen (PERCOCET) 5-325 mg per tablet Take 1 tablet by mouth every 4 (four) hours as needed.  0    pantoprazole (PROTONIX) 40 mg injection Inject 40 mg into the vein 2 (two) times daily. 2400 mg 11    psyllium husk, with sugar, 3.4 gram packet Take 2 packets by mouth 3 (three) times daily.      Ringer's solution,lactated (LACTATED RINGERS) infusion Inject 125 mL/hr into the vein continuous.         Allergies:  Ciprofloxacin and Pcn [penicillins]    Past Family History:  Reviewed; refer to Hospitalist Admission Note    Review of Systems:  Review of Systems - All 14 systems reviewed and negative, except as noted in HPI    Physical Exam:  BP (!) 123/57   Pulse 93   Temp 98.8 °F (37.1 °C) (Oral)   Resp (!) 31   Ht 5' 7.5" (1.715 m)   Wt 63.5 kg (139 lb 15.9 oz)   SpO2 (!) 91%   Breastfeeding? No   BMI 21.60 kg/m²     INS/OUTS:  I/O last 3 completed shifts:  In: 1570 [P.O.:720; Other:200; IV Piggyback:50]  Out: 7000 [Urine:4300; Stool:900; Blood:1800]  I/O this shift:  In: 480 [P.O.:480]  Out: -     General Appearance:    NAD, AAO x 3, cooperative, appears stated age   Head:    Normocephalic, atraumatic   Eyes:    PER, EOMI, and conjunctiva/sclera clear bilaterally        Mouth:   Moist mucus membranes, no thrush or oral lesions, normal      dentition   Back:     No CVA tenderness   Lungs:     Clear to auscultation bilaterally, no wheeze, crackles, rales      or rhonchi, symmetric air movement, respirations unlabored   Heart:    Regular rate and rhythm, S1 and S2 normal, no murmur, rub   or gallop   Abdomen:     Soft, non-tender, non-distended, bowel sounds active all four   quadrants, no RT or guarding, no masses, no organomegaly   Extremities:   Warm and well perfused, " distal pulses intact, no cyanosis or    peripheral edema   MSK:   No joint or muscle swelling, tenderness or deformity   Skin:   Skin color, texture, turgor normal, no rashes or lesions   Neurologic/Psychiatric:   CNII-XII intact, normal strength and sensation       throughout, no asterixis; normal affect, memory, judgement     and insight     Results:  Lab Results   Component Value Date     03/18/2020    K 3.5 03/18/2020     03/18/2020    CO2 29 03/18/2020    BUN 28 (H) 03/18/2020    CREATININE 1.8 (H) 03/18/2020    CALCIUM 8.1 (L) 03/18/2020    ANIONGAP 7 (L) 03/18/2020    ESTGFRAFRICA 32.6 (A) 03/18/2020    EGFRNONAA 28.3 (A) 03/18/2020       Lab Results   Component Value Date    CALCIUM 8.1 (L) 03/18/2020    PHOS 4.1 03/18/2020       Recent Labs   Lab 03/18/20  0453   WBC 11.19   RBC 2.46*   HGB 7.5*   HCT 23.8*      MCV 97   MCH 30.5   MCHC 31.5*       I have personally reviewed pertinent radiological imaging and reports.    Jorge Webb MD  [facility] [address] [address]  [phone] (p)  [phone] (f)

## 2020-03-18 NOTE — SUBJECTIVE & OBJECTIVE
Past Medical History:   Diagnosis Date    Chronic kidney disease, stage III (moderate) 2018    Diabetes mellitus, type 2     Fatigue     Hypertension     Iron deficiency anemia due to chronic blood loss 2018    Vulvar cancer 2019       Past Surgical History:   Procedure Laterality Date    ABDOMINAL SURGERY      BACK SURGERY      carpel tunnel surgery once on each hand      CERVICAL FUSION      x 4    cervix repair      CHOLECYSTECTOMY      DIAGNOSTIC LAPAROSCOPY N/A 2020    Procedure: LAPAROSCOPY, DIAGNOSTIC;  Surgeon: Robbi Lovell III, MD;  Location: [facility];  Service: General;  Laterality: N/A;    HYSTERECTOMY  1980    ILEOSTOMY N/A 2020    Procedure: CREATION, ILEOSTOMY Loop;  Surgeon: Robbi Lovell III, MD;  Location: [facility] OR;  Service: General;  Laterality: N/A;    LUMBAR LAMINECTOMY      LYSIS OF ADHESIONS N/A 2020    Procedure: LYSIS, ADHESIONS;  Surgeon: Robbi Lovell III, MD;  Location: [facility] OR;  Service: General;  Laterality: N/A;    NECK SURGERY      PHLEBOGRAPHY  2020    Procedure: VENOGRAM;  Surgeon: Robbi Lovell III, MD;  Location: [facility];  Service: General;;    REMOVAL OF VASCULAR ACCESS PORT Right 2020    Procedure: REMOVAL, VASCULAR ACCESS PORT;  Surgeon: Robbi Lovell III, MD;  Location: [facility];  Service: General;  Laterality: Right;    SHOULDER SURGERY      vulva cancer         Review of patient's allergies indicates:   Allergen Reactions    Ciprofloxacin Other (See Comments)     Elevated liver enzymes      Pcn [penicillins] Anaphylaxis       Family History     Problem Relation (Age of Onset)    Cancer Mother    Heart disease Mother, Father, Sister    Hypertension Mother    No Known Problems Daughter        Tobacco Use    Smoking status: Former Smoker     Packs/day: 1.00     Years: 33.00     Pack years: 33.00     Types: Cigarettes     Last attempt to quit: 2000     Years since quittin.6     Smokeless tobacco: Never Used   Substance and Sexual Activity    Alcohol use: No    Drug use: No    Sexual activity: Not on file         Review of Systems   Constitutional: Positive for activity change, appetite change, fatigue and unexpected weight change. Negative for chills and fever.   HENT: Negative for congestion, hearing loss, nosebleeds, postnasal drip, sneezing and sore throat.    Respiratory: Positive for cough and shortness of breath. Negative for apnea, choking, chest tightness, wheezing and stridor.    Cardiovascular: Positive for leg swelling. Negative for chest pain and palpitations.   Gastrointestinal: Positive for diarrhea. Negative for abdominal distention, abdominal pain, blood in stool, constipation and nausea.        + ostomy   Genitourinary: Positive for hematuria (? lee trauma). Negative for dysuria, frequency and urgency.   Musculoskeletal: Positive for neck pain. Negative for arthralgias and myalgias.   Neurological: Positive for weakness and headaches. Negative for syncope and numbness.   Hematological: Negative for adenopathy.   Psychiatric/Behavioral: Negative for dysphoric mood and suicidal ideas. The patient is not nervous/anxious.      Objective:     Vital Signs (Most Recent):  Temp: 98.9 °F (37.2 °C) (03/18/20 1156)  Pulse: 96 (03/18/20 1156)  Resp: (!) 27 (03/18/20 1156)  BP: (!) 157/67 (03/18/20 1156)  SpO2: (!) 88 % (03/18/20 1156) Vital Signs (24h Range):  Temp:  [98.4 °F (36.9 °C)-99.7 °F (37.6 °C)] 98.9 °F (37.2 °C)  Pulse:  [] 96  Resp:  [25-52] 27  SpO2:  [88 %-94 %] 88 %  BP: (119-160)/(57-73) 157/67     Weight: 63.5 kg (139 lb 15.9 oz)  Body mass index is 21.6 kg/m².      Intake/Output Summary (Last 24 hours) at 3/18/2020 1507  Last data filed at 3/18/2020 1209  Gross per 24 hour   Intake 1760 ml   Output 6100 ml   Net -4340 ml       Physical Exam   Constitutional: She is oriented to person, place, and time. She appears well-developed and well-nourished. No  distress.   Chronically ill female, NAD, AAO x 3  Breathing comfortably   HENT:   Head: Normocephalic and atraumatic.   Right Ear: External ear normal.   Left Ear: External ear normal.   Nose: Nose normal.   Mouth/Throat: Oropharynx is clear and moist.   Eyes: Pupils are equal, round, and reactive to light. Conjunctivae and EOM are normal.   Neck: Normal range of motion. Neck supple. No JVD present. No tracheal deviation present. No thyromegaly present.   Cardiovascular: Normal rate, regular rhythm, normal heart sounds and intact distal pulses. Exam reveals no gallop and no friction rub.   No murmur heard.  Pulmonary/Chest: Effort normal. No stridor. No respiratory distress. She has no wheezes. She has rales. She exhibits no tenderness.   Decreased at bases  No acc m use   Abdominal: Soft. Bowel sounds are normal. She exhibits no distension. There is no tenderness. There is no rebound and no guarding.   + ostomy   Musculoskeletal: Normal range of motion. She exhibits edema (dependent). She exhibits no tenderness.   Lymphadenopathy:     She has no cervical adenopathy.   Neurological: She is alert and oriented to person, place, and time. No cranial nerve deficit.   Skin: Skin is warm and dry. She is not diaphoretic.   Psychiatric: She has a normal mood and affect. Her behavior is normal.   Nursing note and vitals reviewed.      Vents:  Oxygen Concentration (%): 45 (03/17/20 2251)    Lines/Drains/Airways     Peripherally Inserted Central Catheter Line            PICC Double Lumen 03/06/20 1154 other (see comments) 12 days          Drain                 Hemodialysis AV Fistula Left upper arm -- days         Ileostomy 02/13/20 1600 Loop RLQ 33 days         Urethral Catheter 03/16/20 2230 Straight-tip 18 Fr. 1 day                Significant Labs:    CBC/Anemia Profile:  Recent Labs   Lab 03/16/20  1551 03/17/20  0423 03/18/20  0453   WBC  --  11.14 11.19   HGB  --  7.9* 7.5*   HCT 24* 24.8* 23.8*   PLT  --  184 231   MCV   --  99* 97   RDW  --  17.2* 16.7*        Chemistries:  Recent Labs   Lab 03/17/20  0423 03/17/20  1251 03/18/20  0453    138 137   K 4.6 3.8 3.5    102 101   CO2 25 26 29   BUN 38* 34* 28*   CREATININE 1.8* 1.8* 1.8*   CALCIUM 7.9* 8.2* 8.1*   ALBUMIN 2.1* 2.1* 2.0*   PROT 4.9* 5.0* 5.0*   BILITOT 1.1* 1.2* 1.4*   ALKPHOS 169* 167* 160*   ALT 10 11 9*   AST 19 19 17   MG 3.3* 2.2 2.3   PHOS 5.5* 3.7 4.1       All pertinent labs within the past 24 hours have been reviewed.    Microbiology Results (last 7 days)     Procedure Component Value Units Date/Time    Urine Culture High Risk [[id number]]  (Abnormal)  (Susceptibility) Collected:  03/15/20 2350    Order Status:  Completed Specimen:  Urine, Catheterized Updated:  03/17/20 0632     Urine Culture, Routine KLEBSIELLA PNEUMONIAE  >100,000 cfu/ml      Narrative:       Indicated criteria for high risk culture:->Other  Other (specify):->sick            Significant Imaging:   I have reviewed and interpreted all pertinent imaging results/findings within the past 24 hours.     EXAMINATION:  XR CHEST AP PORTABLE    CLINICAL HISTORY:  sob;    TECHNIQUE:  Single frontal view of the chest is performed    COMPARISON:  Chest x-ray 03/13/2020, 03/10/2020, 02/28/2020 and 04/02/2013.    FINDINGS:  There is progressive increase in the bilateral lung opacity with increasing diffuse interstitial opacity and developing patchy airspace opacity.  There is blunting of the left lateral costophrenic angle consistent with left pleural effusion.    Cardiac silhouette is mildly enlarged    There is no change in position of the right-sided central line.    Fusion hardware seen in the lower cervical spine.      Impression       Increasing diffuse bilateral lung opacity consistent with pulmonary edema, ARDS, or diffuse pneumonia      Electronically signed by: Maykel Schofield MD  Date: 03/16/2020  Time: 14:01

## 2020-03-18 NOTE — PT/OT/SLP PROGRESS
Occupational Therapy   Treatment    Name: Shara Hutchins  MRN: [id number]  Admitting Diagnosis:  Chronic diarrhea       Recommendations:     Discharge Recommendations: LTACH (long-term acute care hospital)  Discharge Equipment Recommendations:  walker, rolling  Barriers to discharge:  Decreased caregiver support    Assessment:     Shara Hutchins is a 69 y.o. female with a medical diagnosis of Chronic diarrhea.  She presents with general weakness and SOB with activity.. Performance deficits affecting function are weakness, impaired endurance, impaired self care skills, impaired functional mobilty.     Rehab Prognosis:  Fair; patient would benefit from acute skilled OT services to address these deficits and reach maximum level of function.       Plan:     Patient to be seen 5 x/week to address the above listed problems via self-care/home management, therapeutic activities, therapeutic exercises  · Plan of Care Expires:    · Plan of Care Reviewed with: patient    Subjective     Pain/Comfort:  · Pain Rating 1: 0/10  · Pain Rating Post-Intervention 1: 0/10    Objective:     Communicated with: nurse prior to session.  Patient found HOB elevated with telemetry, lee catheter, peripheral IV, oxygen, pulse ox (continuous) upon OT entry to room.    General Precautions: Standard, fall   Orthopedic Precautions:N/A   Braces: N/A     Occupational Performance:     Bed Mobility:    · Patient declined EOB/OOB activity this session.     BUE/BLE Therex  · Performed BUE/BLE therex for 10 repetitions with supervision.    [facility] 6 Click ADL:      Treatment & Education:  Educated patient on Pursed Lip Breathing (PLB) and taking frequent rest breaks when O2 sats drop below 88%.     Patient left HOB elevated with all lines intact and call button in reachEducation:      GOALS:   Multidisciplinary Problems     Occupational Therapy Goals        Problem: Occupational Therapy Goal    Goal Priority Disciplines Outcome Interventions   Occupational  Therapy Goal     OT, PT/OT Ongoing, Progressing    Description:  Goals to be met by: discharge     Patient will increase functional independence with ADLs by performing:    UE Dressing with Set-up Assistance.  LE Dressing with Set-up Assistance.  Grooming while standing at sink with Supervision.  Upper extremity exercise program x20 reps per handout, with independence.                      Time Tracking:     OT Date of Treatment: 03/18/20  OT Start Time: 1024  OT Stop Time: 1041  OT Total Time (min): 17 min    Billable Minutes:Therapeutic Exercise 17    Dashawn Sutherland OT  3/18/2020

## 2020-03-18 NOTE — PLAN OF CARE
Problem: Physical Therapy Goal  Goal: Physical Therapy Goal  Description  Goals to be met by: D/C    Patient will increase functional independence with mobility by performin. Supine to sit with Modified [place]  2. Sit to stand transfer with Stand-by Assistance  3. Gait  x 100 feet with Stand-by Assistance using Rolling Walker.      Outcome: Ongoing, Progressing  Patient limited 2/2 desaturations with minimal exertion.

## 2020-03-18 NOTE — PT/OT/SLP PROGRESS
Physical Therapy Treatment    Patient Name:  Shara Hutchins   MRN:  [id number]    Recommendations:     Discharge Recommendations:  nursing facility, skilled, LTACH (long-term acute care hospital)   Discharge Equipment Recommendations: (TBD at next level of care)   Barriers to discharge: None    Assessment:     Shara Hutchins is a 69 y.o. female admitted with a medical diagnosis of Chronic diarrhea.  She presents with the following impairments/functional limitations:  weakness, impaired endurance, impaired self care skills, impaired functional mobilty. Patient performs bed mobility with supervision but continues to be limited 2/2 desaturation with minimal exertion and endorses SOB. Pt on 5L and desatted to 80% sitting EOB. Pt required an increase to 8L to recover once back in bed. Pt left resting in bed on 5L and sats 92% post treatment. RN notified. Continue with PT and POC.    Rehab Prognosis: Good; patient would benefit from acute skilled PT services to address these deficits and reach maximum level of function.    Recent Surgery: * No surgery found *      Plan:     During this hospitalization, patient to be seen 6 x/week to address the identified rehab impairments via gait training, therapeutic activities, therapeutic exercises and progress toward the following goals:    · Plan of Care Expires:  03/18/20    Subjective     Chief Complaint: SOB  Patient/Family Comments/goals:   Pain/Comfort:  · Pain Rating 1: 0/10      Objective:     Communicated with RN prior to session.  Patient found HOB elevated with telemetry, lee catheter, peripheral IV, oxygen, pulse ox (continuous), colostomy upon PT entry to room.     General Precautions: Standard, fall   Orthopedic Precautions:N/A   Braces:       Functional Mobility:  · Bed Mobility:     · Supine to Sit: supervision  · Sit to Supine: supervision      AM-PAC 6 CLICK MOBILITY          Therapeutic Activities and Exercises:   bed mobility; sitting EOB for trunk control and midline  orientation; pursed lip breathing education    Patient left HOB elevated with all lines intact, call button in reach, bed alarm on and RN notified..    GOALS:   Multidisciplinary Problems     Physical Therapy Goals        Problem: Physical Therapy Goal    Goal Priority Disciplines Outcome Goal Variances Interventions   Physical Therapy Goal     PT, PT/OT Ongoing, Progressing     Description:  Goals to be met by: D/C    Patient will increase functional independence with mobility by performin. Supine to sit with Modified Riley  2. Sit to stand transfer with Stand-by Assistance  3. Gait  x 100 feet with Stand-by Assistance using Rolling Walker.                       Time Tracking:     PT Received On: 20  PT Start Time: 913     PT Stop Time: 921  PT Total Time (min): 8 min     Billable Minutes: Therapeutic Activity 8    Treatment Type: Treatment  PT/PTA: PTA     PTA Visit Number: Rajani     Amanda Ayers PTA  2020

## 2020-03-18 NOTE — ASSESSMENT & PLAN NOTE
· Due to pulmonary edema and volume overload  · Recheck CXR today  · Diuretics and follow   · Minimize fluids  · O2 to keep sats > 90%  · Prn albuterol  · Check weight daily

## 2020-03-18 NOTE — PROGRESS NOTES
[facility]  Adult Nutrition   Progress Note (Nutrition Support Management)    SUMMARY     Recommendations  Recommendation/Intervention:   1. New TPN ordered to be initiated today @ 1700. New rate 55 ml/hr.   2. Recommend new weight be obtained.  3. Recommend NPO status if ostomy output does not decrease. Continued high output may worsen renal function.  4. RD believes other interventions should be considered to help decrease output: discontinue appetite stimulant, start a probiotic, discontinue medications with sugar alcohols if medically able.   5. Blood glucose and electrolytes to be monitored and corrected accordingly.  6. RD to monitor PO intake, TPN infusion and tolerance. Monitor I/Os. Will continue to monitor and manage nutrition support daily.      Goals:   1. Patient to meet estimated needs via TPN provision.  2. Weight to be obtained and evaluated.  3. Ostomy output to be controlled.   4. Blood glucose and electrolyte to trendtowards normal limits.       Nutrition Goal Status: progressing towards goal    Dietitian Rounds Brief  TPN to conitnue. New TPN ordered.     PARENTERAL NUTRITION PROGRESS NOTE:    Parenteral Nutrition Day # 18  Diagnosis/Indication for PN: high ostomy output, severe malnutrition   IV Access: PICC  Diet/Tube Feeding: bland diet          Admixture Type: 3-in-1 TPN, custom (15% AA, 70% dextrose, 20% Smoflipid)   Infusion Rate and Frequency: 55 ml/hr   Patient Acuity: Acute care     PN Composition:   76 grams Amino Acid, 301 grams Dextrose, and 60 grams Lipid.    Today's TPN provides 1927 kcal.     Please see order for electrolytes and additives content and adjustments.   *The Nutrition Support Team will continue to monitor electrolytes daily and adjust parenteral nutrition as warranted.    Reason for Assessment  Reason For Assessment: new TPN, RD follow-up  Interdisciplinary Rounds: attended    Nutrition Risk Screen  Nutrition Risk Screen: tube feeding or parenteral  "nutrition       Ileostomy 02/13/20 1600 Loop RLQ-Wound Image: Images linked       Pressure Injury 02/27/20 1837 Left Buttocks Stage 2-Wound Image: Images linked  MST Score: 0  Have you recently lost weight without trying?: No(not since coming to hospital)  Weight loss score: 0  Have you been eating poorly because of a decreased appetite?: No  Appetite score: 0       Nutrition/Diet History  Spiritual, Cultural Beliefs, Tenriism Practices, Values that Affect Care: no  Food Allergies: NKFA  Factors Affecting Nutritional Intake: malabsorption, altered gastrointestinal function  Nutrition Support Formula Prior to Admit: Other (see comments)(Custom TPN )    Anthropometrics  Temp: 98.9 °F (37.2 °C)  Height Method: Stated  Height: 5' 7.5" (171.5 cm)  Height (inches): 67.5 in  Weight Method: Bed Scale  Weight: 63.5 kg (139 lb 15.9 oz)  Weight (lb): 139.99 lb  Ideal Body Weight (IBW), Female: 137.5 lb  % Ideal Body Weight, Female (lb): 101.81 %  BMI (Calculated): 21.6  BMI Grade: 18.5-24.9 - normal       Weight History:  Wt Readings from Last 10 Encounters:   03/08/20 63.5 kg (139 lb 15.9 oz)   03/06/20 53 kg (116 lb 13.5 oz)   03/07/20 52.6 kg (116 lb)   03/03/20 53 kg (116 lb 13.5 oz)   02/19/20 58.7 kg (129 lb 4.8 oz)   02/19/20 58.8 kg (129 lb 9.6 oz)   02/14/20 56.7 kg (125 lb)   02/05/20 56.9 kg (125 lb 6.4 oz)   01/22/20 57.2 kg (126 lb)   01/08/20 61.4 kg (135 lb 6.4 oz)       Lab/Procedures/Meds: Pertinent Labs Reviewed  Clinical Chemistry:  Recent Labs   Lab 03/17/20  0423 03/17/20  1251 03/18/20  0453    138 137   K 4.6 3.8 3.5    102 101   CO2 25 26 29   * 126* 141*   BUN 38* 34* 28*   CREATININE 1.8* 1.8* 1.8*   CALCIUM 7.9* 8.2* 8.1*   PROT 4.9* 5.0* 5.0*   ALBUMIN 2.1* 2.1* 2.0*   BILITOT 1.1* 1.2* 1.4*   ALKPHOS 169* 167* 160*   AST 19 19 17   ALT 10 11 9*   ANIONGAP 11 10 7*   ESTGFRAFRICA 32.6* 32.6* 32.6*   EGFRNONAA 28.3* 28.3* 28.3*   MG 3.3* 2.2 2.3   PHOS 5.5* 3.7 4.1     CBC: "   Recent Labs   Lab 03/18/20  0453   WBC 11.19   RBC 2.46*   HGB 7.5*   HCT 23.8*      MCV 97   MCH 30.5   MCHC 31.5*     Lipid Panel:  Recent Labs   Lab 03/16/20  0525   TRIG 55     Thyroid & Parathyroid:  Recent Labs   Lab 03/14/20  0504   TSH 3.770   FREET4 0.84     Medications: Pertinent Medications reviewed  Scheduled Meds:   cefTRIAXone (ROCEPHIN) IVPB  1 g Intravenous Q24H    diphenhydrAMINE  6.25 mg Intravenous Q6H    diphenoxylate-atropine 2.5-0.025 mg  1 tablet Oral QID    dronabinoL  2.5 mg Oral BID AC    epoetin nica-ebpx (RETACRIT) injection  100 Units/kg Subcutaneous Q7 Days    levothyroxine  88 mcg Oral Before breakfast    loperamide  4 mg Oral QID    magnesium oxide  400 mg Oral BID    pantoprazole  40 mg Intravenous BID    psyllium husk (with sugar)  2 packet Oral TID    sertraline  50 mg Oral Daily    sodium chloride  1 spray Each Nostril Daily     Continuous Infusions:   dextrose 10 % in water (D10W)      TPN ADULT CENTRAL LINE CUSTOM (3 in 1) 50 mL/hr at 03/17/20 1959    TPN ADULT CENTRAL LINE CUSTOM (3 in 1)       PRN Meds:.sodium chloride, acetaminophen, butalbital-acetaminophen-caffeine -40 mg, calcium chloride IVPB, calcium chloride IVPB, calcium chloride IVPB, calcium chloride IVPB, calcium chloride IVPB, calcium chloride IVPB, dextrose 10 % in water (D10W), dextrose 50%, dextrose 50%, glucagon (human recombinant), glucose, glucose, HYDROcodone-acetaminophen, hyoscyamine, magnesium oxide, magnesium oxide, magnesium sulfate IVPB, magnesium sulfate IVPB, magnesium sulfate IVPB, magnesium sulfate IVPB, magnesium sulfate IVPB, magnesium sulfate IVPB, magnesium sulfate IVPB, magnesium sulfate IVPB, melatonin, ondansetron, potassium chloride in water, potassium chloride in water, potassium chloride in water, potassium chloride in water, potassium chloride in water, potassium chloride in water, potassium chloride in water, potassium chloride in water, potassium chloride,  potassium chloride, potassium chloride, potassium chloride, potassium chloride, potassium chloride, potassium chloride, potassium chloride, sodium chloride 0.9%, sodium phosphate IVPB, sodium phosphate IVPB, sodium phosphate IVPB, sodium phosphate IVPB, sodium phosphate IVPB, sodium phosphate IVPB, sodium phosphate IVPB, sodium phosphate IVPB, sodium phosphate IVPB, sodium phosphate IVPB    Estimated/Assessed Needs  Weight Used For Calorie Calculations: 63.5 kg (139 lb 15.9 oz)  Energy Calorie Requirements (kcal): 1905 - 2223 (30 - 35)   Energy Need Method: Kcal/kg  Protein Requirements: 95 - 127 (1.5 - 2 g/kg)   Weight Used For Protein Calculations: 63.5 kg (139 lb 15.9 oz)     Estimated Fluid Requirement Method: RDA Method  RDA Method (mL): 1905       Nutrition Prescription Ordered  Current Diet Order: Regular Diet   Current Nutrition Support Formula Ordered: Other (Comment)(custom TPN )  Current Nutrition Support Rate Ordered: 50 (ml)  Current Nutrition Support Frequency Ordered: ml/hr     Evaluation of Received Nutrient/Fluid Intake  Parenteral Calories (kcal): 1827  Parenteral Protein (gm): 51  Parenteral Fluid (mL): 1200  Energy Calories Required: meeting needs  Protein Required: meeting needs  Fluid Required: meeting needs  Tolerance: tolerating     Intake/Output Summary (Last 24 hours) at 3/18/2020 1422  Last data filed at 3/18/2020 1209  Gross per 24 hour   Intake 1760 ml   Output 6100 ml   Net -4340 ml     Nutrition Risk  Level of Risk/Frequency of Follow-up: high     Monitor and Evaluation  Food and Nutrient Intake: parenteral nutrition intake, energy intake, food and beverage intake  Food and Nutrient Adminstration: enteral and parenteral nutrition administration, diet order  Physical Activity and Function: nutrition-related ADLs and IADLs, factors affecting access to physical activity  Anthropometric Measurements: weight, weight change, body mass index  Biochemical Data, Medical Tests and Procedures:  electrolyte and renal panel, lipid profile, gastrointestinal profile, glucose/endocrine profile, inflammatory profile  Nutrition-Focused Physical Findings: overall appearance     Nutrition Follow-Up  RD Follow-up?: Yes    Sandra Lovell RD 03/18/2020 2:22 PM

## 2020-03-18 NOTE — PLAN OF CARE
This note also relates to the following rows which could not be included:  SpO2 - Cannot attach notes to unvalidated device data  Pulse - Cannot attach notes to unvalidated device data       03/18/20 0938   Patient Assessment/Suction   Level of Consciousness (AVPU) alert   All Lung Fields Breath Sounds clear;diminished   PRE-TX-O2   O2 Device (Oxygen Therapy) nasal cannula   $ Is the patient on Low Flow Oxygen? Yes   Flow (L/min) 5   Pulse Oximetry Type Continuous   $ Pulse Oximetry - Multiple Charge Pulse Oximetry - Multiple   Resp (!) 25   Preset CPAP/BiPAP Settings   $ CPAP/BiPAP Daily Charge BiPAP/CPAP Daily

## 2020-03-19 ENCOUNTER — TELEPHONE (OUTPATIENT)
Dept: UROLOGY | Facility: CLINIC | Age: 70
End: 2020-03-19

## 2020-03-19 ENCOUNTER — CLINICAL SUPPORT (OUTPATIENT)
Dept: CARDIOLOGY | Facility: HOSPITAL | Age: 70
DRG: 640 | End: 2020-03-19
Attending: INTERNAL MEDICINE
Payer: MEDICARE

## 2020-03-19 LAB
ABO + RH BLD: NORMAL
ALBUMIN SERPL BCP-MCNC: 2 G/DL (ref 3.5–5.2)
ALP SERPL-CCNC: 177 U/L (ref 55–135)
ALT SERPL W/O P-5'-P-CCNC: 11 U/L (ref 10–44)
ANION GAP SERPL CALC-SCNC: 7 MMOL/L (ref 8–16)
ANION GAP SERPL CALC-SCNC: 7 MMOL/L (ref 8–16)
AORTIC ROOT ANNULUS: 2.87 CM
AORTIC VALVE CUSP SEPERATION: 1.93 CM
AST SERPL-CCNC: 17 U/L (ref 10–40)
AV INDEX (PROSTH): 0.86
AV MEAN GRADIENT: 8 MMHG
AV PEAK GRADIENT: 16 MMHG
AV VALVE AREA: 2.74 CM2
AV VELOCITY RATIO: 72.9
BILIRUB SERPL-MCNC: 1.4 MG/DL (ref 0.1–1)
BLD GP AB SCN CELLS X3 SERPL QL: NORMAL
BNP SERPL-MCNC: 1253 PG/ML (ref 0–99)
BSA FOR ECHO PROCEDURE: 1.74 M2
BUN SERPL-MCNC: 26 MG/DL (ref 8–23)
BUN SERPL-MCNC: 26 MG/DL (ref 8–23)
CALCIUM SERPL-MCNC: 7.9 MG/DL (ref 8.7–10.5)
CALCIUM SERPL-MCNC: 7.9 MG/DL (ref 8.7–10.5)
CHLORIDE SERPL-SCNC: 102 MMOL/L (ref 95–110)
CHLORIDE SERPL-SCNC: 102 MMOL/L (ref 95–110)
CO2 SERPL-SCNC: 26 MMOL/L (ref 23–29)
CO2 SERPL-SCNC: 26 MMOL/L (ref 23–29)
CREAT SERPL-MCNC: 1.8 MG/DL (ref 0.5–1.4)
CREAT SERPL-MCNC: 1.8 MG/DL (ref 0.5–1.4)
CRP SERPL-MCNC: 25.73 MG/DL (ref 0–0.75)
CV ECHO LV RWT: 0.53 CM
DOP CALC AO PEAK VEL: 1.97 M/S
DOP CALC AO VTI: 31.97 CM
DOP CALC LVOT AREA: 3.2 CM2
DOP CALC LVOT DIAMETER: 2.01 CM
DOP CALC LVOT PEAK VEL: 143.61 M/S
DOP CALC LVOT STROKE VOLUME: 87.6 CM3
DOP CALCLVOT PEAK VEL VTI: 27.62 CM
E WAVE DECELERATION TIME: 145.04 MSEC
E/A RATIO: 0.96
E/E' RATIO: 8.18 M/S
ECHO LV POSTERIOR WALL: 1.26 CM (ref 0.6–1.1)
ERYTHROCYTE [DISTWIDTH] IN BLOOD BY AUTOMATED COUNT: 16.8 % (ref 11.5–14.5)
ERYTHROCYTE [SEDIMENTATION RATE] IN BLOOD BY WESTERGREN METHOD: 84 MM/HR (ref 0–20)
EST. GFR  (AFRICAN AMERICAN): 32.6 ML/MIN/1.73 M^2
EST. GFR  (AFRICAN AMERICAN): 32.6 ML/MIN/1.73 M^2
EST. GFR  (NON AFRICAN AMERICAN): 28.3 ML/MIN/1.73 M^2
EST. GFR  (NON AFRICAN AMERICAN): 28.3 ML/MIN/1.73 M^2
FRACTIONAL SHORTENING: 47 % (ref 28–44)
GLUCOSE SERPL-MCNC: 111 MG/DL (ref 70–110)
GLUCOSE SERPL-MCNC: 117 MG/DL (ref 70–110)
GLUCOSE SERPL-MCNC: 153 MG/DL (ref 70–110)
GLUCOSE SERPL-MCNC: 153 MG/DL (ref 70–110)
GLUCOSE SERPL-MCNC: 167 MG/DL (ref 70–110)
HCT VFR BLD AUTO: 24.2 % (ref 37–48.5)
HGB BLD-MCNC: 7.6 G/DL (ref 12–16)
INTERVENTRICULAR SEPTUM: 1.28 CM (ref 0.6–1.1)
IVRT: 82.88 MSEC
LEFT ATRIUM SIZE: 3.23 CM
LEFT INTERNAL DIMENSION IN SYSTOLE: 2.53 CM (ref 2.1–4)
LEFT VENTRICLE DIASTOLIC VOLUME INDEX: 61.76 ML/M2
LEFT VENTRICLE DIASTOLIC VOLUME: 109.68 ML
LEFT VENTRICLE MASS INDEX: 132 G/M2
LEFT VENTRICLE SYSTOLIC VOLUME INDEX: 26.7 ML/M2
LEFT VENTRICLE SYSTOLIC VOLUME: 47.34 ML
LEFT VENTRICULAR INTERNAL DIMENSION IN DIASTOLE: 4.75 CM (ref 3.5–6)
LEFT VENTRICULAR MASS: 233.77 G
LV LATERAL E/E' RATIO: 8.18 M/S
LV SEPTAL E/E' RATIO: 8.18 M/S
MAGNESIUM SERPL-MCNC: 2.3 MG/DL (ref 1.6–2.6)
MCH RBC QN AUTO: 30.5 PG (ref 27–31)
MCHC RBC AUTO-ENTMCNC: 31.4 G/DL (ref 32–36)
MCV RBC AUTO: 97 FL (ref 82–98)
MV PEAK A VEL: 0.94 M/S
MV PEAK E VEL: 0.9 M/S
PHOSPHATE SERPL-MCNC: 3.7 MG/DL (ref 2.7–4.5)
PISA TR MAX VEL: 3.13 M/S
PLATELET # BLD AUTO: 302 K/UL (ref 150–350)
PMV BLD AUTO: 11.9 FL (ref 9.2–12.9)
POTASSIUM SERPL-SCNC: 3.2 MMOL/L (ref 3.5–5.1)
POTASSIUM SERPL-SCNC: 3.2 MMOL/L (ref 3.5–5.1)
POTASSIUM SERPL-SCNC: 3.9 MMOL/L (ref 3.5–5.1)
PROCALCITONIN SERPL IA-MCNC: 1.26 NG/ML (ref 0–0.5)
PROT SERPL-MCNC: 5.4 G/DL (ref 6–8.4)
PV PEAK VELOCITY: 171.11 CM/S
RA PRESSURE: 8 MMHG
RBC # BLD AUTO: 2.49 M/UL (ref 4–5.4)
RIGHT VENTRICULAR END-DIASTOLIC DIMENSION: 334 CM
SODIUM SERPL-SCNC: 135 MMOL/L (ref 136–145)
SODIUM SERPL-SCNC: 135 MMOL/L (ref 136–145)
TDI LATERAL: 0.11 M/S
TDI SEPTAL: 0.11 M/S
TDI: 0.11 M/S
TR MAX PG: 39 MMHG
TV REST PULMONARY ARTERY PRESSURE: 47 MMHG
WBC # BLD AUTO: 11.56 K/UL (ref 3.9–12.7)

## 2020-03-19 PROCEDURE — 25000003 PHARM REV CODE 250: Performed by: FAMILY MEDICINE

## 2020-03-19 PROCEDURE — C9113 INJ PANTOPRAZOLE SODIUM, VIA: HCPCS | Performed by: INTERNAL MEDICINE

## 2020-03-19 PROCEDURE — 63600175 PHARM REV CODE 636 W HCPCS: Performed by: INTERNAL MEDICINE

## 2020-03-19 PROCEDURE — 84100 ASSAY OF PHOSPHORUS: CPT

## 2020-03-19 PROCEDURE — 93306 TTE W/DOPPLER COMPLETE: CPT

## 2020-03-19 PROCEDURE — 83880 ASSAY OF NATRIURETIC PEPTIDE: CPT

## 2020-03-19 PROCEDURE — 63600175 PHARM REV CODE 636 W HCPCS: Performed by: NURSE PRACTITIONER

## 2020-03-19 PROCEDURE — 86140 C-REACTIVE PROTEIN: CPT

## 2020-03-19 PROCEDURE — 86920 COMPATIBILITY TEST SPIN: CPT

## 2020-03-19 PROCEDURE — 85027 COMPLETE CBC AUTOMATED: CPT

## 2020-03-19 PROCEDURE — 94660 CPAP INITIATION&MGMT: CPT

## 2020-03-19 PROCEDURE — 63600175 PHARM REV CODE 636 W HCPCS: Performed by: FAMILY MEDICINE

## 2020-03-19 PROCEDURE — 80053 COMPREHEN METABOLIC PANEL: CPT

## 2020-03-19 PROCEDURE — 99900035 HC TECH TIME PER 15 MIN (STAT)

## 2020-03-19 PROCEDURE — 21000000 HC CCU ICU ROOM CHARGE

## 2020-03-19 PROCEDURE — 94640 AIRWAY INHALATION TREATMENT: CPT

## 2020-03-19 PROCEDURE — 25000003 PHARM REV CODE 250: Performed by: INTERNAL MEDICINE

## 2020-03-19 PROCEDURE — 99232 PR SUBSEQUENT HOSPITAL CARE,LEVL II: ICD-10-PCS | Mod: ,,, | Performed by: INTERNAL MEDICINE

## 2020-03-19 PROCEDURE — 94761 N-INVAS EAR/PLS OXIMETRY MLT: CPT

## 2020-03-19 PROCEDURE — A4217 STERILE WATER/SALINE, 500 ML: HCPCS | Performed by: INTERNAL MEDICINE

## 2020-03-19 PROCEDURE — 84132 ASSAY OF SERUM POTASSIUM: CPT

## 2020-03-19 PROCEDURE — B4185 PARENTERAL SOL 10 GM LIPIDS: HCPCS | Performed by: INTERNAL MEDICINE

## 2020-03-19 PROCEDURE — 99232 SBSQ HOSP IP/OBS MODERATE 35: CPT | Mod: ,,, | Performed by: INTERNAL MEDICINE

## 2020-03-19 PROCEDURE — 84145 PROCALCITONIN (PCT): CPT

## 2020-03-19 PROCEDURE — 25000003 PHARM REV CODE 250

## 2020-03-19 PROCEDURE — 83735 ASSAY OF MAGNESIUM: CPT

## 2020-03-19 PROCEDURE — 86901 BLOOD TYPING SEROLOGIC RH(D): CPT

## 2020-03-19 PROCEDURE — 85651 RBC SED RATE NONAUTOMATED: CPT

## 2020-03-19 PROCEDURE — 27000221 HC OXYGEN, UP TO 24 HOURS

## 2020-03-19 RX ORDER — DILTIAZEM HCL 1 MG/ML
INJECTION, SOLUTION INTRAVENOUS
Status: COMPLETED
Start: 2020-03-19 | End: 2020-03-19

## 2020-03-19 RX ORDER — DILTIAZEM HCL 1 MG/ML
5 INJECTION, SOLUTION INTRAVENOUS CONTINUOUS
Status: DISCONTINUED | OUTPATIENT
Start: 2020-03-19 | End: 2020-03-21

## 2020-03-19 RX ORDER — POTASSIUM CHLORIDE 20 MEQ/1
40 TABLET, EXTENDED RELEASE ORAL 2 TIMES DAILY
Status: COMPLETED | OUTPATIENT
Start: 2020-03-19 | End: 2020-03-20

## 2020-03-19 RX ORDER — SPIRONOLACTONE 25 MG/1
25 TABLET ORAL DAILY
Status: DISCONTINUED | OUTPATIENT
Start: 2020-03-19 | End: 2020-03-19

## 2020-03-19 RX ORDER — FUROSEMIDE 10 MG/ML
20 INJECTION INTRAMUSCULAR; INTRAVENOUS 2 TIMES DAILY
Status: DISCONTINUED | OUTPATIENT
Start: 2020-03-19 | End: 2020-03-19

## 2020-03-19 RX ORDER — HYDROCODONE BITARTRATE AND ACETAMINOPHEN 500; 5 MG/1; MG/1
TABLET ORAL
Status: DISCONTINUED | OUTPATIENT
Start: 2020-03-19 | End: 2020-03-22

## 2020-03-19 RX ADMIN — DIPHENOXYLATE HYDROCHLORIDE AND ATROPINE SULFATE 1 TABLET: 2.5; .025 TABLET ORAL at 09:03

## 2020-03-19 RX ADMIN — SMOFLIPID: 6; 6; 5; 3 INJECTION, EMULSION INTRAVENOUS at 05:03

## 2020-03-19 RX ADMIN — LOPERAMIDE HYDROCHLORIDE 4 MG: 2 CAPSULE ORAL at 09:03

## 2020-03-19 RX ADMIN — PANTOPRAZOLE SODIUM 40 MG: 40 INJECTION, POWDER, LYOPHILIZED, FOR SOLUTION INTRAVENOUS at 09:03

## 2020-03-19 RX ADMIN — MAGNESIUM OXIDE 400 MG: 400 TABLET ORAL at 09:03

## 2020-03-19 RX ADMIN — PSYLLIUM HUSK 2 PACKET: 3.4 POWDER ORAL at 05:03

## 2020-03-19 RX ADMIN — DIPHENOXYLATE HYDROCHLORIDE AND ATROPINE SULFATE 1 TABLET: 2.5; .025 TABLET ORAL at 05:03

## 2020-03-19 RX ADMIN — CYCLOBENZAPRINE HYDROCHLORIDE 5 MG: 5 TABLET, FILM COATED ORAL at 10:03

## 2020-03-19 RX ADMIN — DIPHENHYDRAMINE HYDROCHLORIDE 6.25 MG: 50 INJECTION INTRAMUSCULAR; INTRAVENOUS at 06:03

## 2020-03-19 RX ADMIN — SERTRALINE HYDROCHLORIDE 50 MG: 50 TABLET ORAL at 09:03

## 2020-03-19 RX ADMIN — DRONABINOL 2.5 MG: 2.5 CAPSULE ORAL at 06:03

## 2020-03-19 RX ADMIN — LOPERAMIDE HYDROCHLORIDE 4 MG: 2 CAPSULE ORAL at 12:03

## 2020-03-19 RX ADMIN — ALBUTEROL SULFATE 2 PUFF: 90 AEROSOL, METERED RESPIRATORY (INHALATION) at 04:03

## 2020-03-19 RX ADMIN — DILTIAZEM HCL 5 MG/HR: 1 INJECTION, SOLUTION INTRAVENOUS at 12:03

## 2020-03-19 RX ADMIN — FUROSEMIDE 5 MG/HR: 10 INJECTION, SOLUTION INTRAMUSCULAR; INTRAVENOUS at 05:03

## 2020-03-19 RX ADMIN — ENOXAPARIN SODIUM 30 MG: 100 INJECTION SUBCUTANEOUS at 05:03

## 2020-03-19 RX ADMIN — DIPHENHYDRAMINE HYDROCHLORIDE 6.25 MG: 50 INJECTION INTRAMUSCULAR; INTRAVENOUS at 05:03

## 2020-03-19 RX ADMIN — ALBUTEROL SULFATE 2 PUFF: 90 AEROSOL, METERED RESPIRATORY (INHALATION) at 08:03

## 2020-03-19 RX ADMIN — BUTALBITAL, ACETAMINOPHEN AND CAFFEINE 1 TABLET: 50; 325; 40 TABLET ORAL at 10:03

## 2020-03-19 RX ADMIN — DIPHENHYDRAMINE HYDROCHLORIDE 6.25 MG: 50 INJECTION INTRAMUSCULAR; INTRAVENOUS at 12:03

## 2020-03-19 RX ADMIN — LEVOTHYROXINE SODIUM 88 MCG: 88 TABLET ORAL at 06:03

## 2020-03-19 RX ADMIN — POTASSIUM CHLORIDE 40 MEQ: 7.46 INJECTION, SOLUTION INTRAVENOUS at 10:03

## 2020-03-19 RX ADMIN — ALBUTEROL SULFATE 2 PUFF: 90 AEROSOL, METERED RESPIRATORY (INHALATION) at 07:03

## 2020-03-19 RX ADMIN — DIPHENOXYLATE HYDROCHLORIDE AND ATROPINE SULFATE 1 TABLET: 2.5; .025 TABLET ORAL at 12:03

## 2020-03-19 RX ADMIN — DIPHENHYDRAMINE HYDROCHLORIDE 6.25 MG: 50 INJECTION INTRAMUSCULAR; INTRAVENOUS at 11:03

## 2020-03-19 RX ADMIN — CEFTRIAXONE SODIUM 1 G: 1 INJECTION, POWDER, FOR SOLUTION INTRAMUSCULAR; INTRAVENOUS at 02:03

## 2020-03-19 RX ADMIN — DRONABINOL 2.5 MG: 2.5 CAPSULE ORAL at 05:03

## 2020-03-19 RX ADMIN — FUROSEMIDE 20 MG: 10 INJECTION, SOLUTION INTRAMUSCULAR; INTRAVENOUS at 12:03

## 2020-03-19 RX ADMIN — ALBUTEROL SULFATE 2 PUFF: 90 AEROSOL, METERED RESPIRATORY (INHALATION) at 11:03

## 2020-03-19 RX ADMIN — LOPERAMIDE HYDROCHLORIDE 4 MG: 2 CAPSULE ORAL at 05:03

## 2020-03-19 RX ADMIN — DILTIAZEM HYDROCHLORIDE 5 MG/HR: 5 INJECTION INTRAVENOUS at 12:03

## 2020-03-19 RX ADMIN — POTASSIUM CHLORIDE 40 MEQ: 20 TABLET, EXTENDED RELEASE ORAL at 09:03

## 2020-03-19 RX ADMIN — PSYLLIUM HUSK 2 PACKET: 3.4 POWDER ORAL at 09:03

## 2020-03-19 NOTE — PLAN OF CARE
03/19/20 0934   Discharge Reassessment   Assessment Type Discharge Planning Reassessment   Discharge Plan A Long-term acute care facility (LTAC)   Discharge Plan B Skilled Nursing Facility   DME Needed Upon Discharge  none   Post-Acute Status   Post-Acute Authorization Placement   Post-Acute Placement Status Referrals Sent   Discharge Delays (!) Change in Medical Condition     As of yesterday, pt was not medically stable for d/c. SW will continue to f/u with Dr. Griffin regarding pt's condition and most appropriate placement option.    [facility] is continuing to keep pt's referral open. As of yesterday, [facility] would not authorized LTAC due to pt not being stable enough for d/c.    Given that pt is anticipated to come off TPN, pt is also pending SNF referrals with [facility], [facility], [facility], [facility]. SW called in LOC and was told one was already called in 3/3/20. SW completed a new PASRR and will await 142.    10:03 SW following up on SNF referral status.  [facility]: SW attempted to call Nadira ([phone]) and was unable to get through or leave . Will call back.  15:52 SW called Nadira again, no response.  [facility]: Per Cachanda, they are unable to meet pt's needs.  GB: LINWOOD sent a message to Shivani to f/u.  14:48 Per Shivani they are unable to meet pt's needs, as TPN was previously ordered for 60 days and pt is high risk for long term care (they are currently not accepting for LTC).  REJI Truong: LINWOOD sent a message to Elizabeth to f/u.  15:54 Per Elizabeth, karen is still in review.  REJI Knowles: LINWOOD spoke with Luba ([phone]), who stated she didn't receive referral and asked SW to refax to [phone]. SW resent via right fax.   10:54 Per Luba they are not in network with pt's insurance so she was denied.  JESLS: LINWOOD spoke with Aj ([phone]) who stated referral is still in review.  15:57 Per Aj,  application is still in review and he will provide SW with an answer in the morning.    14:13 Per Dr. Griffin, pt may d/c on TPN (meaning she would not be able to get SNF placement). SW followed up with Eufemia from [facility], who requested SW send updated clinicals tomorrow and see if pt looks stable enough to re-submit to [facility] for auth.

## 2020-03-19 NOTE — ASSESSMENT & PLAN NOTE
· Due to pulmonary edema and volume overload  · Continue diuresis   · Minimize fluids  · O2 to keep sats > 90%  · Prn albuterol  · Check weight daily

## 2020-03-19 NOTE — CONSULTS
[facility]  Cardiology  Consult    Patient Name: Shara Hutchins  MRN: [id number]  Admission Date: 3/7/2020  Code Status: DNR   Attending Provider: Digna Griffin MD   Primary Care Physician: April Horton MD  Principal Problem:<principal problem not specified>    Patient information was obtained from patient and ER records.     Subjective:     Chief Complaint:  Came in for ileostomy    HPI:   Ms. Hutchins is a 69 year old female patient with a PMH significant for Vulvar Cancer S/P Chemo and radiation, HTN, CKD, and DM.  She originally came in to the hospital for her ileostomy about one month ago. Patient was re-admitted for severe dehydration and malnutrition with 20 lb weight loss at home due to high output from the ileostomy.  Patient was started on TPN and IV fluids.  Patient continued to have high output to the ileostomy with multiple anti-diarrheal agents.  Patient was started on octreotide with improved diarrhea.  Patient's symptoms improved during hospitalization.  Hg trending down with hematuria noted to lee and had a unit of blood yesterday. She also has a positive urine culture with Klebsiella Pnumoniae. She has been in the hospital now for 12 days She is now requiring more and more oxygen today on bipap.  We are being consulted for possible CHF. BNP is elevated at 1253.     FROM H AND P     HTN, NIDDM-2, CKD-3, vulvar cancer (s/p chemo/radiation), and now s/p ileostomy after pneumatosis intestinalis earlier in 2020 and with persistent diarrhea 2/2 chemotherapy, who is transferred from [facility] for IR placement of CVC. Upon admission to [facility], she was having high output via her ileostomy, and subsequent DARCI and dehydration. She was placed on TPN, but there was difficulty getting IV access, and thus was receiving the TPN via a right femoral CVC, which was limiting therapy participation. As a tunneled line was unable to be obtained, she was transferred for IR services at [facility].      Procedure(s) (LRB):  Insertion, Picc, Age 5 Years Or Older (Right     3/8: Ms Hutchins will require placement for long term TPN/octreatide fofor 60 days    Past Medical History:   Diagnosis Date    Chronic kidney disease, stage III (moderate) 1/7/2018    Diabetes mellitus, type 2     Fatigue     Hypertension     Iron deficiency anemia due to chronic blood loss 1/7/2018    Vulvar cancer 1/7/2019       Past Surgical History:   Procedure Laterality Date    ABDOMINAL SURGERY      BACK SURGERY      carpel tunnel surgery once on each hand      CERVICAL FUSION      x 4    cervix repair      CHOLECYSTECTOMY  2000    DIAGNOSTIC LAPAROSCOPY N/A 2/13/2020    Procedure: LAPAROSCOPY, DIAGNOSTIC;  Surgeon: Robbi Lovell III, MD;  Location: [facility] OR;  Service: General;  Laterality: N/A;    HYSTERECTOMY  1980    ILEOSTOMY N/A 2/13/2020    Procedure: CREATION, ILEOSTOMY Loop;  Surgeon: Robbi Lovell III, MD;  Location: [facility] OR;  Service: General;  Laterality: N/A;    LUMBAR LAMINECTOMY      LYSIS OF ADHESIONS N/A 2/13/2020    Procedure: LYSIS, ADHESIONS;  Surgeon: Robbi Lovell III, MD;  Location: [facility] OR;  Service: General;  Laterality: N/A;    NECK SURGERY      PHLEBOGRAPHY  2/28/2020    Procedure: VENOGRAM;  Surgeon: Robbi Lovell III, MD;  Location: [facility] OR;  Service: General;;    REMOVAL OF VASCULAR ACCESS PORT Right 2/13/2020    Procedure: REMOVAL, VASCULAR ACCESS PORT;  Surgeon: Robbi Lovell III, MD;  Location: [facility] OR;  Service: General;  Laterality: Right;    SHOULDER SURGERY      vulva cancer         Review of patient's allergies indicates:   Allergen Reactions    Ciprofloxacin Other (See Comments)     Elevated liver enzymes      Pcn [penicillins] Anaphylaxis       No current facility-administered medications on file prior to encounter.      Current Outpatient Medications on File Prior to Encounter   Medication Sig    acetaminophen (TYLENOL) 325 MG tablet Take 2  tablets (650 mg total) by mouth every 4 (four) hours as needed.    cholestyramine-aspartame (QUESTRAN LIGHT) 4 gram PwPk Take 1 packet (4 g total) by mouth 2 (two) times daily.    dextrose 50% injection Inject 25 mLs (12.5 g total) into the vein as needed (between 51 - 70 mg/dL if patient cannnot take PO but has IV access.).    dextrose 50% injection Inject 50 mLs (25 g total) into the vein as needed (less than 50 mg/dL if patient cannnot take PO but has IV access.).    diphenhydrAMINE (BENADRYL) 50 mg/mL injection Inject 0.125 mLs (6.25 mg total) into the vein every 6 (six) hours.    glucose 4 GM chewable tablet Take 4 tablets (16 g total) by mouth as needed.    glucose 4 GM chewable tablet Take 6 tablets (24 g total) by mouth as needed.    HYDROcodone-acetaminophen (NORCO) 5-325 mg per tablet Take 1 tablet by mouth every 6 (six) hours as needed.    levothyroxine (SYNTHROID) 88 MCG tablet Take 1 tablet (88 mcg total) by mouth before breakfast.    melatonin (MELATIN) 3 mg tablet Take 3 tablets (9 mg total) by mouth nightly as needed for Insomnia.    ondansetron (ZOFRAN-ODT) 8 MG TbDL Take 1 tablet (8 mg total) by mouth every 8 (eight) hours as needed.    oxyCODONE-acetaminophen (PERCOCET) 5-325 mg per tablet Take 1 tablet by mouth every 4 (four) hours as needed.    pantoprazole (PROTONIX) 40 mg injection Inject 40 mg into the vein 2 (two) times daily.    psyllium husk, with sugar, 3.4 gram packet Take 2 packets by mouth 3 (three) times daily.    Ringer's solution,lactated (LACTATED RINGERS) infusion Inject 125 mL/hr into the vein continuous.     Family History     Problem Relation (Age of Onset)    Cancer Mother    Heart disease Mother, Father, Sister    Hypertension Mother    No Known Problems Daughter        Tobacco Use    Smoking status: Former Smoker     Packs/day: 1.00     Years: 33.00     Pack years: 33.00     Types: Cigarettes     Last attempt to quit: 2000     Years since quittin.6     Smokeless tobacco: Never Used   Substance and Sexual Activity    Alcohol use: No    Drug use: No    Sexual activity: Not on file       REVIEW OF SYSTEMS:    Constitutional: +Low grade fever  Eyes: No double vision, No blurred vision  Neuro: No headaches, No dizziness  Respiratory: +SOB  Cardiovascular: Negative for chest pain. Negative for palpitations and leg swelling.   Gastrointestinal: Negative for abdominal pain, No melena, diarrhea, nausea and vomiting.   Genitourinary: +hematuria  Skin: Negative for bruising, Negative for edema or discoloration noted.   Endocrine: Negative for polyphagia, Negative for heat intolerance, Negative for cold intolerance  Psychiatric: Negative for depression, Negative for anxiety, Negative for memory loss  Musculoskeletal: Negative for neck pain, Negative for muscle weakness, Negative for back pain     Objective:     Vital Signs (Most Recent):  Temp: 99.9 °F (37.7 °C) (03/19/20 1139)  Pulse: 80 (03/19/20 0716)  Resp: 20 (03/19/20 0716)  BP: 123/65 (03/19/20 1139)  SpO2: (!) 90 % (03/19/20 0716) Vital Signs (24h Range):  Temp:  [98.8 °F (37.1 °C)-99.9 °F (37.7 °C)] 99.9 °F (37.7 °C)  Pulse:  [] 80  Resp:  [16-48] 20  SpO2:  [84 %-97 %] 90 %  BP: ()/(52-83) 123/65     Weight: 66 kg (145 lb 8.1 oz)  Body mass index is 22.45 kg/m².    SpO2: (!) 90 %  O2 Device (Oxygen Therapy): BiPAP      Intake/Output Summary (Last 24 hours) at 3/19/2020 1507  Last data filed at 3/19/2020 0838  Gross per 24 hour   Intake 1085 ml   Output 2550 ml   Net -1465 ml       Lines/Drains/Airways     Peripherally Inserted Central Catheter Line            PICC Double Lumen 03/06/20 1154 other (see comments) 13 days          Drain                 Hemodialysis AV Fistula Left upper arm -- days         Ileostomy 02/13/20 1600 Loop RLQ 34 days         Urethral Catheter 03/16/20 2230 Straight-tip 18 Fr. 2 days                PHYSICAL EXAM:    GENERAL: Frail thin elderly female in no  distress  HEENT: Normocephalic. Conjunctival pallor  NECK: No JVD.   THYROID: Thyroid not enlarged. No nodules present..   CARDIAC: Regular rate and rhythm. S1 is normal.S2 is normal.  CHEST ANATOMY: normal.   LUNGS: Diminished mild rales noted, no overt crackles   ABDOMEN: +Ostomy   URINARY: lee catheter with hematuria  EXTREMITIES: No cyanosis, clubbing or edema noted at this time., no calf tenderness bilaterally.   PERIPHERAL VASCULAR SYSTEM: Good palpable distal pulses.   CENTRAL NERVOUS SYSTEM: No focal motor or sensory deficits noted.   SKIN: Skin without lesions, moist, well perfused.   MUSCLE STRENGTH & TONE: No noteable weakness, atrophy or abnormal movement.       Significant Labs:     Results for UMAIR CHA (MRN [id number]) as of 3/19/2020 15:07   Ref. Range 3/19/2020 04:25 3/19/2020 04:25   WBC Latest Ref Range: 3.90 - 12.70 K/uL 11.56    RBC Latest Ref Range: 4.00 - 5.40 M/uL 2.49 (L)    Hemoglobin Latest Ref Range: 12.0 - 16.0 g/dL 7.6 (L)    Hematocrit Latest Ref Range: 37.0 - 48.5 % 24.2 (L)    MCV Latest Ref Range: 82 - 98 fL 97    MCH Latest Ref Range: 27.0 - 31.0 pg 30.5    MCHC Latest Ref Range: 32.0 - 36.0 g/dL 31.4 (L)    RDW Latest Ref Range: 11.5 - 14.5 % 16.8 (H)    Platelets Latest Ref Range: 150 - 350 K/uL 302    MPV Latest Ref Range: 9.2 - 12.9 fL 11.9    Sodium Latest Ref Range: 136 - 145 mmol/L 135 (L) 135 (L)   Potassium Latest Ref Range: 3.5 - 5.1 mmol/L 3.2 (L) 3.2 (L)   Chloride Latest Ref Range: 95 - 110 mmol/L 102 102   CO2 Latest Ref Range: 23 - 29 mmol/L 26 26   Anion Gap Latest Ref Range: 8 - 16 mmol/L 7 (L) 7 (L)   BUN, Bld Latest Ref Range: 8 - 23 mg/dL 26 (H) 26 (H)   Creatinine Latest Ref Range: 0.5 - 1.4 mg/dL 1.8 (H) 1.8 (H)   eGFR if non African American Latest Ref Range: >60 mL/min/1.73 m^2 28.3 (A) 28.3 (A)   eGFR if African American Latest Ref Range: >60 mL/min/1.73 m^2 32.6 (A) 32.6 (A)   Glucose Latest Ref Range: 70 - 110 mg/dL 153 (H) 153 (H)   Calcium Latest Ref  Range: 8.7 - 10.5 mg/dL 7.9 (L) 7.9 (L)   Phosphorus Latest Ref Range: 2.7 - 4.5 mg/dL 3.7    Magnesium Latest Ref Range: 1.6 - 2.6 mg/dL 2.3    Alkaline Phosphatase Latest Ref Range: 55 - 135 U/L 177 (H)    PROTEIN TOTAL Latest Ref Range: 6.0 - 8.4 g/dL 5.4 (L)    Albumin Latest Ref Range: 3.5 - 5.2 g/dL 2.0 (L)    BILIRUBIN TOTAL Latest Ref Range: 0.1 - 1.0 mg/dL 1.4 (H)    AST Latest Ref Range: 10 - 40 U/L 17    ALT Latest Ref Range: 10 - 44 U/L 11    BNP Latest Ref Range: 0 - 99 pg/mL 1,253 (H)      Significant Imaging:   Mike Martinez MD 3/18/2020 Routine      Narrative     EXAMINATION:  XR CHEST AP PORTABLE    CLINICAL HISTORY:  dyspnea;    FINDINGS:  Portable chest radiograph at 15:18 hours compared to multiple prior exams including 03/16/2018 shows right internal jugular central venous catheter, unchanged in position.  The cardiac silhouette is enlarged, stable in size, with the pulmonary vasculature enlarged and indistinct centrally, unchanged.  There are aortic vascular calcifications.    The lungs are symmetrically inflated, with diffuse reticulonodular and ground-glass opacities throughout both lungs, with some confluence in the left mid lung and in the right lower lung.  There are small bilateral pleural effusions suspected blunting the costophrenic angles.  No pneumothorax.      Impression       No significant interval change in diffuse interstitial opacities, potentially reflecting acute congestive failure and or hypervolemia, viral or atypical pneumonia, and or developing ARDS.      Electronically signed by: Mike Martinez MD  Date: 03/18/2020  Time: 15:49         I HAVE REVIEWED :    The vital signs, nurses notes, and all the pertinent radiology and labs.     Assessment and Plan:     1. SOB and Hypoxemia  2. Elevated BNP-1253.  3. CXR show hypervolemia atypical pneumonia or developing ARDs on yesterday  4. Anemia and hematuria  5. Klebsiella Pneumoniae to urine  6. DARCI on CKD Stage III  7.  Unstageable pressure ulcer  8. Inflammatory Bowel Disease  9. Thyroid Dysfunction  10. S/P Ileostomy  11. H/O Vulvar Cancer   12. Hypokalemia  13. Low Grade Fever      PLAN:  Replace Potassium  Recommend the following  Lasix gtt @ 5/hr for the next 12-18hours  CT of chest with out contrast to evaluate further, lung sounds do not match hypervolemia or clinical presentation  Await Finalized ECHO  Continue Bipap to keep saturations above 90 and per pulmonary  Check BNP in am CXR in am        Active Diagnoses:    Diagnosis Date Noted POA    Acute hypoxemic respiratory failure [J96.01] 03/18/2020 No    Malnutrition [E46] 03/07/2020 Yes    Debility [R53.81] 03/07/2020 Yes    Unstageable pressure injury of skin and tissue [L89.95] 03/06/2020 Yes    DNR (do not resuscitate) [Z66] 02/24/2020 Yes    S/P ileostomy [Z93.2] 02/13/2020 Not Applicable    Chronic diarrhea [K52.9] 02/10/2020 Yes     Chronic    Anemia of chronic disease [D63.8] 02/10/2020 Yes     Chronic    Stage 5 chronic kidney disease not on chronic dialysis [N18.5] 11/04/2018 Yes     Chronic    Iron deficiency anemia due to chronic blood loss [D50.0] 01/07/2018 Yes     Chronic    Inflammatory bowel disease [K52.9] 01/07/2018 Yes     Chronic      Problems Resolved During this Admission:    Diagnosis Date Noted Date Resolved POA    Thrombocytopenia, unspecified [D69.6] 03/07/2020 03/18/2020 Yes    Acute renal failure superimposed on stage 5 chronic kidney disease, not on chronic dialysis [N17.9, N18.5] 02/10/2020 03/18/2020 Yes           VTE Risk Mitigation (From admission, onward)         Ordered     enoxaparin injection 30 mg  Every 24 hours (non-standard times)      03/18/20 1524     Place sequential compression device  Until discontinued      03/08/20 0133                Gaby Malcolm NP  Cardiology   [facility]      I have personally interviewed and examined the patient, I have reviewed the Nurse Practitioner's history and  physical, assessment, and plan. I have personally evaluated the patient at bedside and agree with the findings.

## 2020-03-19 NOTE — PT/OT/SLP PROGRESS
Occupational Therapy      Patient Name:  Shara Hutchins   MRN:  [id number]    Patient not seen today secondary to Other (Comment)(on Bipap). Will follow-up tomorrow.    Dashawn Sutherland OT  3/19/2020

## 2020-03-19 NOTE — ASSESSMENT & PLAN NOTE
· Fairly severe, follow  · On TPN but she states that she may be weaned off soon  · Note that lat prealbumin was still low

## 2020-03-19 NOTE — SUBJECTIVE & OBJECTIVE
Interval History:     3/19/2020 - Stable overnight, no new issues reported.  Weight is up 2 kg from admit but that weight is up about 10 kg from last hospital stay.  No new respiratory complaints, remains dyspneic and on NC O2.chest X-ray yesterday was unchanged.  I/O negative 2.2 liters.  Had atrial fibrillation last night and is back in NSR on cardizem.    Review of Systems   Constitutional: Positive for malaise/fatigue and weight loss. Negative for chills, diaphoresis and fever.   HENT: Negative for congestion.    Eyes: Negative for pain.   Respiratory: Positive for cough and shortness of breath. Negative for hemoptysis, sputum production, wheezing and stridor.    Cardiovascular: Positive for orthopnea and leg swelling. Negative for chest pain, palpitations, claudication and PND.   Gastrointestinal: Positive for diarrhea. Negative for abdominal pain, constipation, heartburn, nausea and vomiting.        Ostomy   Genitourinary: Negative for dysuria, frequency, hematuria and urgency.   Musculoskeletal: Negative for falls and myalgias.   Neurological: Positive for weakness (generalized). Negative for sensory change and focal weakness.   Psychiatric/Behavioral: Negative for depression and suicidal ideas. The patient is not nervous/anxious.          Objective:     Vital Signs (Most Recent):  Temp: 99.5 °F (37.5 °C) (03/19/20 0722)  Pulse: 80 (03/19/20 0716)  Resp: 20 (03/19/20 0716)  BP: 137/63 (03/19/20 0722)  SpO2: (!) 90 % (03/19/20 0716) Vital Signs (24h Range):  Temp:  [98.8 °F (37.1 °C)-99.5 °F (37.5 °C)] 99.5 °F (37.5 °C)  Pulse:  [] 80  Resp:  [16-48] 20  SpO2:  [84 %-97 %] 90 %  BP: ()/(52-83) 137/63     Weight: 66 kg (145 lb 8.1 oz)  Body mass index is 22.45 kg/m².      Intake/Output Summary (Last 24 hours) at 3/19/2020 1115  Last data filed at 3/19/2020 0872  Gross per 24 hour   Intake 1565 ml   Output 4400 ml   Net -2835 ml       Physical Exam   Constitutional: She is oriented to person, place,  and time. She appears well-developed and well-nourished. No distress.   Chronically ill female, nad  Sitting in bed   HENT:   Head: Normocephalic and atraumatic.   Right Ear: External ear normal.   Left Ear: External ear normal.   Nose: Nose normal.   Mouth/Throat: Oropharynx is clear and moist.   Eyes: Pupils are equal, round, and reactive to light. Conjunctivae and EOM are normal.   Neck: Normal range of motion. Neck supple. No JVD present. No tracheal deviation present. No thyromegaly present.   Cardiovascular: Normal rate, regular rhythm, normal heart sounds and intact distal pulses. Exam reveals no gallop and no friction rub.   No murmur heard.  Pulmonary/Chest: Effort normal. No stridor. No respiratory distress. She has no wheezes. She has rales. She exhibits no tenderness.   Decreased at bases   Abdominal: Soft. Bowel sounds are normal. She exhibits no distension. There is no tenderness. There is no rebound and no guarding.   + ostomy   Musculoskeletal: Normal range of motion. She exhibits edema (in dependent areas). She exhibits no tenderness.   Lymphadenopathy:     She has no cervical adenopathy.   Neurological: She is alert and oriented to person, place, and time. No cranial nerve deficit.   Skin: Skin is warm and dry. She is not diaphoretic.   Psychiatric: She has a normal mood and affect. Her behavior is normal.   Nursing note and vitals reviewed.      Vents:  Oxygen Concentration (%): 45 (03/19/20 0424)    Lines/Drains/Airways     Peripherally Inserted Central Catheter Line            PICC Double Lumen 03/06/20 1154 other (see comments) 12 days          Drain                 Hemodialysis AV Fistula Left upper arm -- days         Ileostomy 02/13/20 1600 Loop RLQ 34 days         Urethral Catheter 03/16/20 2230 Straight-tip 18 Fr. 2 days                Significant Labs:    CBC/Anemia Profile:  Recent Labs   Lab 03/18/20  0453 03/19/20  0425   WBC 11.19 11.56   HGB 7.5* 7.6*   HCT 23.8* 24.2*    302    MCV 97 97   RDW 16.7* 16.8*        Chemistries:  Recent Labs   Lab 03/17/20  1251 03/18/20  0453 03/19/20  0425    137 135*  135*   K 3.8 3.5 3.2*  3.2*    101 102  102   CO2 26 29 26  26   BUN 34* 28* 26*  26*   CREATININE 1.8* 1.8* 1.8*  1.8*   CALCIUM 8.2* 8.1* 7.9*  7.9*   ALBUMIN 2.1* 2.0* 2.0*   PROT 5.0* 5.0* 5.4*   BILITOT 1.2* 1.4* 1.4*   ALKPHOS 167* 160* 177*   ALT 11 9* 11   AST 19 17 17   MG 2.2 2.3 2.3   PHOS 3.7 4.1 3.7       All pertinent labs within the past 24 hours have been reviewed.    Microbiology Results (last 7 days)     Procedure Component Value Units Date/Time    Urine Culture High Risk [[id number]]  (Abnormal)  (Susceptibility) Collected:  03/15/20 2350    Order Status:  Completed Specimen:  Urine, Catheterized Updated:  03/17/20 0632     Urine Culture, Routine KLEBSIELLA PNEUMONIAE  >100,000 cfu/ml      Narrative:       Indicated criteria for high risk culture:->Other  Other (specify):->sick            Significant Imaging:  I have reviewed and interpreted all pertinent imaging results/findings within the past 24 hours.     CXR (3/18) - about the same, bilateral infiltrates c/w fluid

## 2020-03-19 NOTE — CONSULTS
Patient hooked back to suction with high flow ostomy pouch.  States came apart last night.  Mini at bedside.  Used suction connector to connect tubing to pouch.

## 2020-03-19 NOTE — PLAN OF CARE
03/18/20 2342   Patient Assessment/Suction   Level of Consciousness (AVPU) alert   Respiratory Effort Normal;Unlabored   Expansion/Accessory Muscles/Retractions no use of accessory muscles   Rhythm/Pattern, Respiratory assisted mechanically   PRE-TX-O2   O2 Device (Oxygen Therapy) BiPAP   Oxygen Concentration (%) 45   SpO2 97 %   Pulse Oximetry Type Continuous   $ Pulse Oximetry - Multiple Charge Pulse Oximetry - Multiple   Pulse (!) 122   Resp (!) 32   BP (!) 96/52   Ready to Wean/Extubation Screen   FIO2<=50 (chart decimal) 0.45   Preset CPAP/BiPAP Settings   Mode Of Delivery BiPAP   $ Is patient using? Yes   Size of Mask Small   Sized Appropriately? Yes   Equipment Type V60   Airway Device Type small full face mask   Ipap 12   EPAP (cm H2O) 6   Pressure Support (cm H2O) 6   Set Rate (Breaths/Min) 12   ITime (sec) 9   Rise Time (sec) 3   Patient CPAP/BiPAP Settings   RR Total (Breaths/Min) 25   Tidal Volume (mL) 514   VE Minute Ventilation (L/min) 13 L/min   Peak Inspiratory Pressure (cm H2O) 12   TiTOT (%) 38   Total Leak (L/Min) 15   Patient Trigger - ST Mode Only (%) 98   Respiratory Evaluation   $ Care Plan Tech Time 15 min   Evaluation For Re-Eval 5+ day

## 2020-03-19 NOTE — PROGRESS NOTES
[facility] Medicine Progress Note    Patient Name: Shara Hutchins MRN: [id number]   Patient Class: IP- Inpatient  Length of Stay: 10   Admission Date: 3/7/2020  8:42 PM Attending Physician: Digna Griffin MD   Primary Care Provider: April Horton MD Face-to-Face encounter date: 03/19/2020   Chief Complaint: No chief complaint on file.        Subjective:    Hospital course:  Patient was admitted for severe dehydration and malnutrition with 20 lb weight loss at home due to high output from the ileostomy.  Patient was started on TPN and IV fluids.  Patient continued to have high output to the ileostomy with multiple anti-diarrheal agents.  Patient was started on octreotide with improved diarrhea.  Patient's symptoms improved during hospitalization.  Patient was tolerating diet and had improved nutrition.  Octreotide was discontinued however patient started having watery diarrhea and increased output.  Discussed with surgery who has concerns for short gut syndrome.  Plan for reversal of ileostomy in several weeks.  Adjusted patient's antidiarrhea medication with improved output from the ostomy.  Discontinue octreotide.  Clinical course complicated by recurrent DARCI and third-spacing causing pitting edema.  3/16:  Has increased shortness of breath.  Requiring 2 L nasal cannula saturating 94%.  Continues to have pitting edema and upper and lower extremities.  Suspect 3rd spacing.  DVT negative on ultrasound.  Stool is more formed today.  Continues to be off octreotide.  Transfuse 1 PRBC yesterday.  Started on EPO per Nephrology.  IV Lasix ordered.  Has good urine output.  Afebrile the past 24 hr.  Urine culture grew gram-negative estelle greater than 100,000.  Rocephin started.  Awaiting for LTAC placement.    3/17:Continued to be hypoxic saturating 88-90% on 4 l oxy   Overnight patient had acute urinary retention, Godwin changed.  Continued to have hematuria,with occasional clots, slowly Hb trending  "down    3/18:primarily worried about headache and neck pain  Also has loss of appetite,flexril added    Interval History: .    Upon entering her room pt desaturated to 78% on nc while trying to lye flat, HOB elevation with bipap improved saturation  Patient also noted to have AFib overnight placed on Cardizem drip, currently in sinus rhythm  Cardiology consult requested for possibly orthopnea causing hypoxia  Echo, unremarkable but BNP elevated  Will transfuse 1 u PRBC if pt not responding to lasix tomorrow    Review of Systems All other Review of Systems were found to be negative expect for that mentioned already in HPI.     Objective:   Physical Exam  BP (!) 143/68 (BP Location: Right arm, Patient Position: Lying)   Pulse 91   Temp 99 °F (37.2 °C) (Oral)   Resp 19   Ht 5' 7.5" (1.715 m)   Wt 66 kg (145 lb 8.1 oz)   SpO2 (!) 91%   Breastfeeding? No   BMI 22.45 kg/m²   Vitals reviewed.    Constitutional: No acute distress.   HENT: Atraumatic. PERRLA  Cardiovascular: Normal rate, regular rhythm and normal heart sounds.   Pulmonary/Chest: Effort normal. She has no wheezes blt rales. S/p port   Abdominal: Soft. no tenderness,no distension,bowel sounds are normal  S/p ostomy ,lee with hematuria  Neurological: AOX3  Skin: Skin is warm and dry.     Following labs were Reviewed   Recent Labs   Lab 03/19/20  0425   WBC 11.56   HGB 7.6*   HCT 24.2*      CALCIUM 7.9*  7.9*   ALBUMIN 2.0*   PROT 5.4*   *  135*   K 3.2*  3.2*   CO2 26  26     102   BUN 26*  26*   CREATININE 1.8*  1.8*   ALKPHOS 177*   ALT 11   AST 17   BILITOT 1.4*     No results found for: POCTGLUCOSE     Microbiology Results (last 7 days)     Procedure Component Value Units Date/Time    Urine Culture High Risk [[id number]]  (Abnormal)  (Susceptibility) Collected:  03/15/20 3334    Order Status:  Completed Specimen:  Urine, Catheterized Updated:  03/17/20 0632     Urine Culture, Routine KLEBSIELLA PNEUMONIAE  >100,000 " cfu/ml      Narrative:       Indicated criteria for high risk culture:->Other  Other (specify):->sick        CT Chest Without Contrast   Final Result      1. Diffuse bilateral abnormal interstitial prominence with multifocal bilateral ground-glass opacities, favored to reflect pulmonary edema.  Pneumonia is felt less likely, but not absolutely excluded.   2. Small left and small to moderate right pleural effusions.   3. Small pericardial effusion.   4. Dense coronary artery atherosclerotic calcification.   5. Ectasia of the thoracic aorta as discussed, stable.   6. Additional findings as above.         Electronically signed by: Dixon Thomas MD   Date:    03/19/2020   Time:    16:07      X-Ray Chest AP Portable   Final Result      No significant interval change in diffuse interstitial opacities, potentially reflecting acute congestive failure and or hypervolemia, viral or atypical pneumonia, and or developing ARDS.         Electronically signed by: Mike Martinez MD   Date:    03/18/2020   Time:    15:49      X-Ray Chest AP Portable   Final Result      Increasing diffuse bilateral lung opacity consistent with pulmonary edema, ARDS, or diffuse pneumonia         Electronically signed by: Maykel Schofield MD   Date:    03/16/2020   Time:    14:01      US Upper Extremity Veins Left   Final Result      Negative for left upper extremity deep venous thrombosis.         Electronically signed by: Mike Martinez MD   Date:    03/15/2020   Time:    10:59      X-Ray Chest AP Portable   Final Result      1. Slightly increasing airspace disease in the left upper lobe when compared to March 10, with slightly increasing small left pleural effusion.  Additional stable findings as described.  Findings could be on the basis of pulmonary edema or pneumonia.         Electronically signed by: Dixon Thomas MD   Date:    03/13/2020   Time:    15:08      X-Ray Chest AP Portable   Final Result      1.  Findings of mild pulmonary vascular  congestion/edema with trace bilateral pleural effusions.      2.  Right-sided IJ central venous catheter, which takes a hairpin turn at its distal tip at the level of the SVC.         Electronically signed by: Rashi Wilder MD   Date:    03/10/2020   Time:    17:43      X-Ray Chest 1 View    (Results Pending)       Inpatient medications  Scheduled Meds:   albuterol  2 puff Inhalation Q8H    cefTRIAXone (ROCEPHIN) IVPB  1 g Intravenous Q24H    diphenhydrAMINE  6.25 mg Intravenous Q6H    diphenoxylate-atropine 2.5-0.025 mg  1 tablet Oral QID    dronabinoL  2.5 mg Oral BID AC    enoxparin  30 mg Subcutaneous Q24H    epoetin nica-ebpx (RETACRIT) injection  100 Units/kg Subcutaneous Q7 Days    levothyroxine  88 mcg Oral Before breakfast    loperamide  4 mg Oral QID    magnesium oxide  400 mg Oral BID    pantoprazole  40 mg Intravenous BID    psyllium husk (with sugar)  2 packet Oral TID    sertraline  50 mg Oral Daily    sodium chloride  1 spray Each Nostril Daily     Continuous Infusions:   dextrose 10 % in water (D10W)      dilTIAZem 5 mg/hr (03/19/20 0045)    furosemide (LASIX) 2 mg/mL continuous infusion (non-titrating)      TPN ADULT CENTRAL LINE CUSTOM (3 in 1) 55 mL/hr at 03/19/20 0600    TPN ADULT CENTRAL LINE CUSTOM (3 in 1)       PRN Meds:.sodium chloride, acetaminophen, butalbital-acetaminophen-caffeine -40 mg, calcium chloride IVPB, calcium chloride IVPB, calcium chloride IVPB, calcium chloride IVPB, calcium chloride IVPB, calcium chloride IVPB, cyclobenzaprine, dextrose 10 % in water (D10W), dextrose 50%, dextrose 50%, glucagon (human recombinant), glucose, glucose, HYDROcodone-acetaminophen, hyoscyamine, magnesium oxide, magnesium oxide, magnesium sulfate IVPB, magnesium sulfate IVPB, magnesium sulfate IVPB, magnesium sulfate IVPB, magnesium sulfate IVPB, magnesium sulfate IVPB, magnesium sulfate IVPB, magnesium sulfate IVPB, melatonin, ondansetron, potassium chloride in water,  potassium chloride in water, potassium chloride in water, potassium chloride in water, potassium chloride in water, potassium chloride in water, potassium chloride in water, potassium chloride in water, potassium chloride, potassium chloride, potassium chloride, potassium chloride, potassium chloride, potassium chloride, potassium chloride, potassium chloride, sodium chloride 0.9%, sodium phosphate IVPB, sodium phosphate IVPB, sodium phosphate IVPB, sodium phosphate IVPB, sodium phosphate IVPB, sodium phosphate IVPB, sodium phosphate IVPB, sodium phosphate IVPB, sodium phosphate IVPB, sodium phosphate IVPB      Assessment & Plan:   Shara Hutchins is a 69 y.o. female admitted for    Malnutrition  Continue TPN until ostomy output decreased  Trial of discontinue octreotide drip  On TPN for nutrition support with suspected short gut syndrome  Will need reversal of ostomy with General surgery in several weeks  Awaiting for LTAC placement  PT/OT  Resolved  thrombocytopenia  S/p 1 PRBC. On epo    Urinary retension with UTI  UCx -Klebsilla on rocephin  On lee  Urology eval appreciated    DARCI/CKD III  Renal function improved  Monitor urine output  Nephrology on board    Unstageable pressure injury of skin and tissue  Wound care ordered    Anemia of chronic disease  Transfuse 1 unit of prbc's  ADAM weekly 100 units/kg SQ.                     Core measures:  -DNR  - DVT PPx: bilateral SCDs   - lines/ tubes: iliostomy, Lees cath       Discharge Planning:   Awaiting for LTAC placement  .       Above encounter included review of the medical records, interviewing and examining the patient face-to-face, discussion with family and other health care providers, ordering and interpreting lab/test results and formulating a plan of care.     Medical Decision Making:      [] Low Complexity  [x] Moderate Complexity  [] High Complexity

## 2020-03-19 NOTE — PLAN OF CARE
03/19/20 0716   Patient Assessment/Suction   Level of Consciousness (AVPU) alert   Respiratory Effort Normal   All Lung Fields Breath Sounds diminished;clear   PRE-TX-O2   O2 Device (Oxygen Therapy) nasal cannula   $ Is the patient on Low Flow Oxygen? Yes   Flow (L/min) 5   SpO2 (!) 90 %   Pulse Oximetry Type Continuous   $ Pulse Oximetry - Multiple Charge Pulse Oximetry - Multiple   Pulse 80   Resp 20   Inhaler   $ Inhaler Charges MDI (Metered Dose Inahler) Treatment   Daily Review of Necessity (Inhaler) completed   Respiratory Treatment Status (Inhaler) given   Treatment Route (Inhaler) mouthpiece   Patient Position (Inhaler) HOB elevated   Post Treatment Assessment (Inhaler) breath sounds unchanged   Signs of Intolerance (Inhaler) none

## 2020-03-19 NOTE — PHYSICIAN QUERY
PT Name: Shara Hutchins  MR #: [id number]    Physician Query Form - Consultant Diagnosis Clarification     CDS/: Marti Busch               Contact information:[phone] OR THROUGH Epic Secure [employer] Messenger  This form is a permanent document in the medical record.     Query Date: March 19, 2020      By submitting this query, we are merely seeking further clarification of documentation.  Please utilize your independent clinical judgment when addressing the question(s) below.      The Medical record contains the following:   Diagnosis Location in Medical Record   Acute hypoxemic respiratory failure    Due to pulmonary edema and volume overload    Pulmonology Consult and PN(3/19)         Do you agree with the Consultants diagnosis of ACUTE HYPOXIC RESPIRATORY FAILURE?    [x  ] Yes   [  ] No   [  ] Other/Clarification of findings:   [  ] Clinically undetermined

## 2020-03-19 NOTE — TELEPHONE ENCOUNTER
Called patient to schedule an appointment with Dr. Barber. No answer. LMOM to give the office a call back.

## 2020-03-19 NOTE — CONSULTS
INPATIENT NEPHROLOGY CONSULT   [facility] NEPHROLOGY    Patient Name: Shara Hutchins  Date: 03/19/2020    Reason for consultation: DARCI/CKD    Chief Complaint: Diarrhea    History of Present Illness:  68 y/o F with hx of HTN, NIDDM-2, CKD-3, vulvar cancer (s/p chemo/radiation), and now s/p ileostomy after pneumatosis intestinalis earlier in 2020 and with persistent diarrhea 2/2 chemotherapy who p/w high ostomy output, treated with TPN/octreotide, awaiting LTAC, developed gross hematuria 3/13, noted to have bump in Cr and Hb drop, consulted for DARCI and anemia.    3/17 VSS, no new complains.  3/18 VSS, no new complains. UO3L, ostomy 900, not sure what blood output 1,800ml is...  3/19 VSS, no new complains. UO3.4L, ostomy 1L.    Plan of Care:    1. DARCI/CKD III  - sCr seem better, baseline is 1.6  - no nsaids or IV contrast    2. Chronic volume depletion 2/2 diarrhea  - monitor ostomy output  - on TPN  - on multiple stool bulking agents, loperamide; surgery in future    3. Gross hematuria  - clearing in lee    4. Acute on chronic anemia of CKD and chronic illness  - transfused 1 unit of blood on 3/15.  - ADAM weekly 100 units/kg SQ.  - will defer further blood transfusions to primary team and Urology.    5. Edema  - Apprecaite cards input.    Thank you for allowing us to participate in this patient's care. We will continue to follow.    Vital Signs:  Temp Readings from Last 3 Encounters:   03/19/20 99.9 °F (37.7 °C) (Axillary)   03/07/20 98.2 °F (36.8 °C) (Oral)   03/05/20 97.8 °F (36.6 °C) (Oral)       Pulse Readings from Last 3 Encounters:   03/19/20 80   03/07/20 (!) 57   03/05/20 61       BP Readings from Last 3 Encounters:   03/19/20 123/65   03/07/20 131/61   03/05/20 (!) 169/74       Weight:  Wt Readings from Last 3 Encounters:   03/19/20 66 kg (145 lb 8.1 oz)   03/06/20 53 kg (116 lb 13.5 oz)   03/07/20 52.6 kg (116 lb)       Past Medical & Surgical History:  Past Medical History:   Diagnosis Date    Chronic  kidney disease, stage III (moderate) 1/7/2018    Diabetes mellitus, type 2     Fatigue     Hypertension     Iron deficiency anemia due to chronic blood loss 1/7/2018    Vulvar cancer 1/7/2019       Past Surgical History:   Procedure Laterality Date    ABDOMINAL SURGERY      BACK SURGERY      carpel tunnel surgery once on each hand      CERVICAL FUSION      x 4    cervix repair      CHOLECYSTECTOMY  2000    DIAGNOSTIC LAPAROSCOPY N/A 2/13/2020    Procedure: LAPAROSCOPY, DIAGNOSTIC;  Surgeon: Robbi Lovell III, MD;  Location: [facility];  Service: General;  Laterality: N/A;    HYSTERECTOMY  1980    ILEOSTOMY N/A 2/13/2020    Procedure: CREATION, ILEOSTOMY Loop;  Surgeon: Robbi Lovell III, MD;  Location: [facility] OR;  Service: General;  Laterality: N/A;    LUMBAR LAMINECTOMY      LYSIS OF ADHESIONS N/A 2/13/2020    Procedure: LYSIS, ADHESIONS;  Surgeon: Robbi Lovell III, MD;  Location: [facility] OR;  Service: General;  Laterality: N/A;    NECK SURGERY      PHLEBOGRAPHY  2/28/2020    Procedure: VENOGRAM;  Surgeon: Robbi Lovell III, MD;  Location: [facility];  Service: General;;    REMOVAL OF VASCULAR ACCESS PORT Right 2/13/2020    Procedure: REMOVAL, VASCULAR ACCESS PORT;  Surgeon: Robbi Lovell III, MD;  Location: [facility];  Service: General;  Laterality: Right;    SHOULDER SURGERY      vulva cancer         Past Social History:  Social History     Socioeconomic History    Marital status:      Spouse name: Not on file    Number of children: Not on file    Years of education: Not on file    Highest education level: Not on file   Occupational History    Not on file   Social Needs    Financial resource strain: Not on file    Food insecurity:     Worry: Not on file     Inability: Not on file    Transportation needs:     Medical: Not on file     Non-medical: Not on file   Tobacco Use    Smoking status: Former Smoker     Packs/day: 1.00     Years: 33.00     Pack years: 33.00      Types: Cigarettes     Last attempt to quit: 2000     Years since quittin.6    Smokeless tobacco: Never Used   Substance and Sexual Activity    Alcohol use: No    Drug use: No    Sexual activity: Not on file   Lifestyle    Physical activity:     Days per week: Not on file     Minutes per session: Not on file    Stress: Not on file   Relationships    Social connections:     Talks on phone: Not on file     Gets together: Not on file     Attends [employer] service: Not on file     Active member of club or organization: Not on file     Attends meetings of clubs or organizations: Not on file     Relationship status: Not on file   Other Topics Concern    Not on file   Social History Narrative    Not on file       Medications:  Scheduled Meds:   albuterol  2 puff Inhalation Q8H    cefTRIAXone (ROCEPHIN) IVPB  1 g Intravenous Q24H    diphenhydrAMINE  6.25 mg Intravenous Q6H    diphenoxylate-atropine 2.5-0.025 mg  1 tablet Oral QID    dronabinoL  2.5 mg Oral BID AC    enoxparin  30 mg Subcutaneous Q24H    epoetin nica-ebpx (RETACRIT) injection  100 Units/kg Subcutaneous Q7 Days    furosemide  20 mg Intravenous BID    levothyroxine  88 mcg Oral Before breakfast    loperamide  4 mg Oral QID    magnesium oxide  400 mg Oral BID    pantoprazole  40 mg Intravenous BID    psyllium husk (with sugar)  2 packet Oral TID    sertraline  50 mg Oral Daily    sodium chloride  1 spray Each Nostril Daily     Continuous Infusions:   dextrose 10 % in water (D10W)      dilTIAZem 5 mg/hr (20 0045)    TPN ADULT CENTRAL LINE CUSTOM (3 in 1) 55 mL/hr at 20 0600     PRN Meds:.sodium chloride, acetaminophen, butalbital-acetaminophen-caffeine -40 mg, calcium chloride IVPB, calcium chloride IVPB, calcium chloride IVPB, calcium chloride IVPB, calcium chloride IVPB, calcium chloride IVPB, cyclobenzaprine, dextrose 10 % in water (D10W), dextrose 50%, dextrose 50%, glucagon (human recombinant),  glucose, glucose, HYDROcodone-acetaminophen, hyoscyamine, magnesium oxide, magnesium oxide, magnesium sulfate IVPB, magnesium sulfate IVPB, magnesium sulfate IVPB, magnesium sulfate IVPB, magnesium sulfate IVPB, magnesium sulfate IVPB, magnesium sulfate IVPB, magnesium sulfate IVPB, melatonin, ondansetron, potassium chloride in water, potassium chloride in water, potassium chloride in water, potassium chloride in water, potassium chloride in water, potassium chloride in water, potassium chloride in water, potassium chloride in water, potassium chloride, potassium chloride, potassium chloride, potassium chloride, potassium chloride, potassium chloride, potassium chloride, potassium chloride, sodium chloride 0.9%, sodium phosphate IVPB, sodium phosphate IVPB, sodium phosphate IVPB, sodium phosphate IVPB, sodium phosphate IVPB, sodium phosphate IVPB, sodium phosphate IVPB, sodium phosphate IVPB, sodium phosphate IVPB, sodium phosphate IVPB  No current facility-administered medications on file prior to encounter.      Current Outpatient Medications on File Prior to Encounter   Medication Sig Dispense Refill    acetaminophen (TYLENOL) 325 MG tablet Take 2 tablets (650 mg total) by mouth every 4 (four) hours as needed.  0    cholestyramine-aspartame (QUESTRAN LIGHT) 4 gram PwPk Take 1 packet (4 g total) by mouth 2 (two) times daily. 180 packet 3    dextrose 50% injection Inject 25 mLs (12.5 g total) into the vein as needed (between 51 - 70 mg/dL if patient cannnot take PO but has IV access.).      dextrose 50% injection Inject 50 mLs (25 g total) into the vein as needed (less than 50 mg/dL if patient cannnot take PO but has IV access.).      diphenhydrAMINE (BENADRYL) 50 mg/mL injection Inject 0.125 mLs (6.25 mg total) into the vein every 6 (six) hours.      glucose 4 GM chewable tablet Take 4 tablets (16 g total) by mouth as needed.  12    glucose 4 GM chewable tablet Take 6 tablets (24 g total) by mouth as  "needed.  12    HYDROcodone-acetaminophen (NORCO) 5-325 mg per tablet Take 1 tablet by mouth every 6 (six) hours as needed.  0    levothyroxine (SYNTHROID) 88 MCG tablet Take 1 tablet (88 mcg total) by mouth before breakfast. 30 tablet 11    melatonin (MELATIN) 3 mg tablet Take 3 tablets (9 mg total) by mouth nightly as needed for Insomnia.  0    ondansetron (ZOFRAN-ODT) 8 MG TbDL Take 1 tablet (8 mg total) by mouth every 8 (eight) hours as needed.      oxyCODONE-acetaminophen (PERCOCET) 5-325 mg per tablet Take 1 tablet by mouth every 4 (four) hours as needed.  0    pantoprazole (PROTONIX) 40 mg injection Inject 40 mg into the vein 2 (two) times daily. 2400 mg 11    psyllium husk, with sugar, 3.4 gram packet Take 2 packets by mouth 3 (three) times daily.      Ringer's solution,lactated (LACTATED RINGERS) infusion Inject 125 mL/hr into the vein continuous.         Allergies:  Ciprofloxacin and Pcn [penicillins]    Past Family History:  Reviewed; refer to Hospitalist Admission Note    Review of Systems:  Review of Systems - All 14 systems reviewed and negative, except as noted in HPI    Physical Exam:  /65 (BP Location: Right arm, Patient Position: Lying)   Pulse 80   Temp 99.9 °F (37.7 °C) (Axillary)   Resp 20   Ht 5' 7.5" (1.715 m)   Wt 66 kg (145 lb 8.1 oz)   SpO2 (!) 90%   Breastfeeding? No   BMI 22.45 kg/m²     INS/OUTS:  I/O last 3 completed shifts:  In: 2645 [P.O.:1200; I.V.:25; Other:200]  Out: 6800 [Urine:4900; Stool:1900]  I/O this shift:  In: 440 [P.O.:440]  Out: -     General Appearance:    NAD, AAO x 3, cooperative, appears stated age   Head:    Normocephalic, atraumatic   Eyes:    PER, EOMI, and conjunctiva/sclera clear bilaterally        Mouth:   Moist mucus membranes, no thrush or oral lesions, normal      dentition   Back:     No CVA tenderness   Lungs:     Clear to auscultation bilaterally, no wheeze, crackles, rales      or rhonchi, symmetric air movement, respirations " unlabored   Heart:    Regular rate and rhythm, S1 and S2 normal, no murmur, rub   or gallop   Abdomen:     Soft, non-tender, non-distended, bowel sounds active all four   quadrants, no RT or guarding, no masses, no organomegaly   Extremities:   Warm and well perfused, distal pulses intact, no cyanosis or    peripheral edema   MSK:   No joint or muscle swelling, tenderness or deformity   Skin:   Skin color, texture, turgor normal, no rashes or lesions   Neurologic/Psychiatric:   CNII-XII intact, normal strength and sensation       throughout, no asterixis; normal affect, memory, judgement     and insight     Results:  Lab Results   Component Value Date     (L) 03/19/2020     (L) 03/19/2020    K 3.2 (L) 03/19/2020    K 3.2 (L) 03/19/2020     03/19/2020     03/19/2020    CO2 26 03/19/2020    CO2 26 03/19/2020    BUN 26 (H) 03/19/2020    BUN 26 (H) 03/19/2020    CREATININE 1.8 (H) 03/19/2020    CREATININE 1.8 (H) 03/19/2020    CALCIUM 7.9 (L) 03/19/2020    CALCIUM 7.9 (L) 03/19/2020    ANIONGAP 7 (L) 03/19/2020    ANIONGAP 7 (L) 03/19/2020    ESTGFRAFRICA 32.6 (A) 03/19/2020    ESTGFRAFRICA 32.6 (A) 03/19/2020    EGFRNONAA 28.3 (A) 03/19/2020    EGFRNONAA 28.3 (A) 03/19/2020       Lab Results   Component Value Date    CALCIUM 7.9 (L) 03/19/2020    CALCIUM 7.9 (L) 03/19/2020    PHOS 3.7 03/19/2020       Recent Labs   Lab 03/19/20  0425   WBC 11.56   RBC 2.49*   HGB 7.6*   HCT 24.2*      MCV 97   MCH 30.5   MCHC 31.4*       I have personally reviewed pertinent radiological imaging and reports.    Jorge Webb MD  [facility] [address] [address]  [phone] (p)  [phone] (f)

## 2020-03-19 NOTE — PT/OT/SLP PROGRESS
Physical Therapy      Patient Name:  Shara Hutchins   MRN:  [id number]    Patient not seen today secondary to (patient with increased respiratory needs this AM. Not appropriate for PT treatment.). Will follow-up 03/20/20.    Amanda Ayers PTA

## 2020-03-19 NOTE — PROGRESS NOTES
[facility]  Pulmonology  Progress Note    Patient Name: Shara Hutchins  MRN: [id number]  Admission Date: 3/7/2020  Hospital Length of Stay: 10 days  Code Status: DNR  Attending Provider: Digna Griffin MD  Primary Care Provider: April Horton MD   Principal Problem: <principal problem not specified>    Subjective:     Interval History:     3/19/2020 - Stable overnight, no new issues reported.  Weight is up 2 kg from admit but that weight is up about 10 kg from last hospital stay.  No new respiratory complaints, remains dyspneic and on NC O2.chest X-ray yesterday was unchanged.  I/O negative 2.2 liters.  Had atrial fibrillation last night and is back in NSR on cardizem.    Review of Systems   Constitutional: Positive for malaise/fatigue and weight loss. Negative for chills, diaphoresis and fever.   HENT: Negative for congestion.    Eyes: Negative for pain.   Respiratory: Positive for cough and shortness of breath. Negative for hemoptysis, sputum production, wheezing and stridor.    Cardiovascular: Positive for orthopnea and leg swelling. Negative for chest pain, palpitations, claudication and PND.   Gastrointestinal: Positive for diarrhea. Negative for abdominal pain, constipation, heartburn, nausea and vomiting.        Ostomy   Genitourinary: Negative for dysuria, frequency, hematuria and urgency.   Musculoskeletal: Negative for falls and myalgias.   Neurological: Positive for weakness (generalized). Negative for sensory change and focal weakness.   Psychiatric/Behavioral: Negative for depression and suicidal ideas. The patient is not nervous/anxious.          Objective:     Vital Signs (Most Recent):  Temp: 99.5 °F (37.5 °C) (03/19/20 0722)  Pulse: 80 (03/19/20 0716)  Resp: 20 (03/19/20 0716)  BP: 137/63 (03/19/20 0722)  SpO2: (!) 90 % (03/19/20 0716) Vital Signs (24h Range):  Temp:  [98.8 °F (37.1 °C)-99.5 °F (37.5 °C)] 99.5 °F (37.5 °C)  Pulse:  [] 80  Resp:  [16-48] 20  SpO2:  [84 %-97 %]  90 %  BP: ()/(52-83) 137/63     Weight: 66 kg (145 lb 8.1 oz)  Body mass index is 22.45 kg/m².      Intake/Output Summary (Last 24 hours) at 3/19/2020 1115  Last data filed at 3/19/2020 0838  Gross per 24 hour   Intake 1565 ml   Output 4400 ml   Net -2835 ml       Physical Exam   Constitutional: She is oriented to person, place, and time. She appears well-developed and well-nourished. No distress.   Chronically ill female, nad  Sitting in bed   HENT:   Head: Normocephalic and atraumatic.   Right Ear: External ear normal.   Left Ear: External ear normal.   Nose: Nose normal.   Mouth/Throat: Oropharynx is clear and moist.   Eyes: Pupils are equal, round, and reactive to light. Conjunctivae and EOM are normal.   Neck: Normal range of motion. Neck supple. No JVD present. No tracheal deviation present. No thyromegaly present.   Cardiovascular: Normal rate, regular rhythm, normal heart sounds and intact distal pulses. Exam reveals no gallop and no friction rub.   No murmur heard.  Pulmonary/Chest: Effort normal. No stridor. No respiratory distress. She has no wheezes. She has rales. She exhibits no tenderness.   Decreased at bases   Abdominal: Soft. Bowel sounds are normal. She exhibits no distension. There is no tenderness. There is no rebound and no guarding.   + ostomy   Musculoskeletal: Normal range of motion. She exhibits edema (in dependent areas). She exhibits no tenderness.   Lymphadenopathy:     She has no cervical adenopathy.   Neurological: She is alert and oriented to person, place, and time. No cranial nerve deficit.   Skin: Skin is warm and dry. She is not diaphoretic.   Psychiatric: She has a normal mood and affect. Her behavior is normal.   Nursing note and vitals reviewed.      Vents:  Oxygen Concentration (%): 45 (03/19/20 0424)    Lines/Drains/Airways     Peripherally Inserted Central Catheter Line            PICC Double Lumen 03/06/20 1154 other (see comments) 12 days          Drain                  Hemodialysis AV Fistula Left upper arm -- days         Ileostomy 02/13/20 1600 Loop RLQ 34 days         Urethral Catheter 03/16/20 2230 Straight-tip 18 Fr. 2 days                Significant Labs:    CBC/Anemia Profile:  Recent Labs   Lab 03/18/20  0453 03/19/20  0425   WBC 11.19 11.56   HGB 7.5* 7.6*   HCT 23.8* 24.2*    302   MCV 97 97   RDW 16.7* 16.8*        Chemistries:  Recent Labs   Lab 03/17/20  1251 03/18/20  0453 03/19/20  0425    137 135*  135*   K 3.8 3.5 3.2*  3.2*    101 102  102   CO2 26 29 26  26   BUN 34* 28* 26*  26*   CREATININE 1.8* 1.8* 1.8*  1.8*   CALCIUM 8.2* 8.1* 7.9*  7.9*   ALBUMIN 2.1* 2.0* 2.0*   PROT 5.0* 5.0* 5.4*   BILITOT 1.2* 1.4* 1.4*   ALKPHOS 167* 160* 177*   ALT 11 9* 11   AST 19 17 17   MG 2.2 2.3 2.3   PHOS 3.7 4.1 3.7       All pertinent labs within the past 24 hours have been reviewed.    Microbiology Results (last 7 days)     Procedure Component Value Units Date/Time    Urine Culture High Risk [[id number]]  (Abnormal)  (Susceptibility) Collected:  03/15/20 2350    Order Status:  Completed Specimen:  Urine, Catheterized Updated:  03/17/20 0632     Urine Culture, Routine KLEBSIELLA PNEUMONIAE  >100,000 cfu/ml      Narrative:       Indicated criteria for high risk culture:->Other  Other (specify):->sick            Significant Imaging:  I have reviewed and interpreted all pertinent imaging results/findings within the past 24 hours.     CXR (3/18) - about the same, bilateral infiltrates c/w fluid    Assessment/Plan:     Acute hypoxemic respiratory failure  · Due to pulmonary edema and volume overload  · Continue diuresis   · Minimize fluids  · O2 to keep sats > 90%  · Prn albuterol  · Check weight daily    Malnutrition  · Fairly severe, follow  · On TPN but she states that she may be weaned off soon  · Note that lat prealbumin was still low    Debility  · PT, OT as able    Unstageable pressure injury of skin and tissue  · Continue with local care    DNR  (do not resuscitate)  · Aware     S/P ileostomy  · Aware     Chronic diarrhea  · S/p ostomy  · Pt reports that stools have more substance    Stage 5 chronic kidney disease not on chronic dialysis  · Aware     Iron deficiency anemia due to chronic blood loss  · Aware, has some minor blood in lee  · Transfuse for hgb < 7    Inflammatory bowel disease  · Aware            Moo Maharaj MD  Pulmonology  [facility]

## 2020-03-19 NOTE — NURSING
Pt c/o acute respiratory distress.  O2 sats decreased to 78% on 5L/hi flow.  Pt placed on bipap.  Dr Griffin at bedside.  Pt reports relief with bipap. O2 sats improved to >91%.  Call light in reach.  Will continue to monitor.

## 2020-03-20 PROBLEM — I27.20 PULMONARY HYPERTENSION: Status: ACTIVE | Noted: 2020-03-20

## 2020-03-20 LAB
ALBUMIN SERPL BCP-MCNC: 2 G/DL (ref 3.5–5.2)
ALP SERPL-CCNC: 216 U/L (ref 55–135)
ALT SERPL W/O P-5'-P-CCNC: 12 U/L (ref 10–44)
ANION GAP SERPL CALC-SCNC: 10 MMOL/L (ref 8–16)
AST SERPL-CCNC: 23 U/L (ref 10–40)
BILIRUB SERPL-MCNC: 1.3 MG/DL (ref 0.1–1)
BNP SERPL-MCNC: 1502 PG/ML (ref 0–99)
BUN SERPL-MCNC: 29 MG/DL (ref 8–23)
CALCIUM SERPL-MCNC: 8.2 MG/DL (ref 8.7–10.5)
CHLORIDE SERPL-SCNC: 99 MMOL/L (ref 95–110)
CO2 SERPL-SCNC: 26 MMOL/L (ref 23–29)
CREAT SERPL-MCNC: 1.7 MG/DL (ref 0.5–1.4)
ERYTHROCYTE [DISTWIDTH] IN BLOOD BY AUTOMATED COUNT: 17.5 % (ref 11.5–14.5)
EST. GFR  (AFRICAN AMERICAN): 35 ML/MIN/1.73 M^2
EST. GFR  (NON AFRICAN AMERICAN): 30.3 ML/MIN/1.73 M^2
GLUCOSE SERPL-MCNC: 115 MG/DL (ref 70–110)
GLUCOSE SERPL-MCNC: 137 MG/DL (ref 70–110)
GLUCOSE SERPL-MCNC: 140 MG/DL (ref 70–110)
GLUCOSE SERPL-MCNC: 142 MG/DL (ref 70–110)
HCT VFR BLD AUTO: 27.7 % (ref 37–48.5)
HGB BLD-MCNC: 8.1 G/DL (ref 12–16)
MAGNESIUM SERPL-MCNC: 2 MG/DL (ref 1.6–2.6)
MCH RBC QN AUTO: 33.9 PG (ref 27–31)
MCHC RBC AUTO-ENTMCNC: 29.2 G/DL (ref 32–36)
MCV RBC AUTO: 116 FL (ref 82–98)
PHOSPHATE SERPL-MCNC: 2.7 MG/DL (ref 2.7–4.5)
PLATELET # BLD AUTO: 340 K/UL (ref 150–350)
PMV BLD AUTO: 11.9 FL (ref 9.2–12.9)
POTASSIUM SERPL-SCNC: 4.4 MMOL/L (ref 3.5–5.1)
PROT SERPL-MCNC: 5.4 G/DL (ref 6–8.4)
RBC # BLD AUTO: 2.39 M/UL (ref 4–5.4)
SODIUM SERPL-SCNC: 135 MMOL/L (ref 136–145)
WBC # BLD AUTO: 11.93 K/UL (ref 3.9–12.7)

## 2020-03-20 PROCEDURE — 97110 THERAPEUTIC EXERCISES: CPT

## 2020-03-20 PROCEDURE — C9113 INJ PANTOPRAZOLE SODIUM, VIA: HCPCS | Performed by: INTERNAL MEDICINE

## 2020-03-20 PROCEDURE — 85027 COMPLETE CBC AUTOMATED: CPT

## 2020-03-20 PROCEDURE — 82962 GLUCOSE BLOOD TEST: CPT

## 2020-03-20 PROCEDURE — 63600175 PHARM REV CODE 636 W HCPCS: Performed by: INTERNAL MEDICINE

## 2020-03-20 PROCEDURE — B4185 PARENTERAL SOL 10 GM LIPIDS: HCPCS | Performed by: INTERNAL MEDICINE

## 2020-03-20 PROCEDURE — 94761 N-INVAS EAR/PLS OXIMETRY MLT: CPT

## 2020-03-20 PROCEDURE — 99232 SBSQ HOSP IP/OBS MODERATE 35: CPT | Mod: ,,, | Performed by: INTERNAL MEDICINE

## 2020-03-20 PROCEDURE — 97530 THERAPEUTIC ACTIVITIES: CPT | Mod: CQ

## 2020-03-20 PROCEDURE — 21000000 HC CCU ICU ROOM CHARGE

## 2020-03-20 PROCEDURE — 83880 ASSAY OF NATRIURETIC PEPTIDE: CPT

## 2020-03-20 PROCEDURE — 99232 PR SUBSEQUENT HOSPITAL CARE,LEVL II: ICD-10-PCS | Mod: ,,, | Performed by: INTERNAL MEDICINE

## 2020-03-20 PROCEDURE — 63600175 PHARM REV CODE 636 W HCPCS: Performed by: FAMILY MEDICINE

## 2020-03-20 PROCEDURE — 94660 CPAP INITIATION&MGMT: CPT

## 2020-03-20 PROCEDURE — 80053 COMPREHEN METABOLIC PANEL: CPT

## 2020-03-20 PROCEDURE — 83735 ASSAY OF MAGNESIUM: CPT

## 2020-03-20 PROCEDURE — 25000003 PHARM REV CODE 250: Performed by: INTERNAL MEDICINE

## 2020-03-20 PROCEDURE — A4217 STERILE WATER/SALINE, 500 ML: HCPCS | Performed by: INTERNAL MEDICINE

## 2020-03-20 PROCEDURE — 27000221 HC OXYGEN, UP TO 24 HOURS

## 2020-03-20 PROCEDURE — 94640 AIRWAY INHALATION TREATMENT: CPT

## 2020-03-20 PROCEDURE — 84100 ASSAY OF PHOSPHORUS: CPT

## 2020-03-20 PROCEDURE — 99900035 HC TECH TIME PER 15 MIN (STAT)

## 2020-03-20 PROCEDURE — 25000003 PHARM REV CODE 250: Performed by: FAMILY MEDICINE

## 2020-03-20 RX ORDER — ALBUTEROL SULFATE 90 UG/1
2 AEROSOL, METERED RESPIRATORY (INHALATION)
Status: DISCONTINUED | OUTPATIENT
Start: 2020-03-20 | End: 2020-03-24

## 2020-03-20 RX ADMIN — DIPHENOXYLATE HYDROCHLORIDE AND ATROPINE SULFATE 1 TABLET: 2.5; .025 TABLET ORAL at 05:03

## 2020-03-20 RX ADMIN — MAGNESIUM OXIDE 400 MG: 400 TABLET ORAL at 08:03

## 2020-03-20 RX ADMIN — LEVOTHYROXINE SODIUM 88 MCG: 88 TABLET ORAL at 05:03

## 2020-03-20 RX ADMIN — AZITHROMYCIN MONOHYDRATE 500 MG: 500 INJECTION, POWDER, LYOPHILIZED, FOR SOLUTION INTRAVENOUS at 08:03

## 2020-03-20 RX ADMIN — ALBUTEROL SULFATE 2 PUFF: 90 AEROSOL, METERED RESPIRATORY (INHALATION) at 07:03

## 2020-03-20 RX ADMIN — LOPERAMIDE HYDROCHLORIDE 4 MG: 2 CAPSULE ORAL at 01:03

## 2020-03-20 RX ADMIN — DIPHENOXYLATE HYDROCHLORIDE AND ATROPINE SULFATE 1 TABLET: 2.5; .025 TABLET ORAL at 01:03

## 2020-03-20 RX ADMIN — LOPERAMIDE HYDROCHLORIDE 4 MG: 2 CAPSULE ORAL at 05:03

## 2020-03-20 RX ADMIN — PSYLLIUM HUSK 2 PACKET: 3.4 POWDER ORAL at 08:03

## 2020-03-20 RX ADMIN — DIPHENHYDRAMINE HYDROCHLORIDE 6.25 MG: 50 INJECTION INTRAMUSCULAR; INTRAVENOUS at 06:03

## 2020-03-20 RX ADMIN — ENOXAPARIN SODIUM 30 MG: 100 INJECTION SUBCUTANEOUS at 05:03

## 2020-03-20 RX ADMIN — PANTOPRAZOLE SODIUM 40 MG: 40 INJECTION, POWDER, LYOPHILIZED, FOR SOLUTION INTRAVENOUS at 08:03

## 2020-03-20 RX ADMIN — CEFTRIAXONE SODIUM 1 G: 1 INJECTION, POWDER, FOR SOLUTION INTRAMUSCULAR; INTRAVENOUS at 01:03

## 2020-03-20 RX ADMIN — DIPHENHYDRAMINE HYDROCHLORIDE 6.25 MG: 50 INJECTION INTRAMUSCULAR; INTRAVENOUS at 05:03

## 2020-03-20 RX ADMIN — LOPERAMIDE HYDROCHLORIDE 4 MG: 2 CAPSULE ORAL at 08:03

## 2020-03-20 RX ADMIN — DRONABINOL 2.5 MG: 2.5 CAPSULE ORAL at 05:03

## 2020-03-20 RX ADMIN — POTASSIUM CHLORIDE 40 MEQ: 20 TABLET, EXTENDED RELEASE ORAL at 08:03

## 2020-03-20 RX ADMIN — SERTRALINE HYDROCHLORIDE 50 MG: 50 TABLET ORAL at 08:03

## 2020-03-20 RX ADMIN — CYCLOBENZAPRINE HYDROCHLORIDE 5 MG: 5 TABLET, FILM COATED ORAL at 08:03

## 2020-03-20 RX ADMIN — ALBUTEROL SULFATE 2 PUFF: 90 AEROSOL, METERED RESPIRATORY (INHALATION) at 08:03

## 2020-03-20 RX ADMIN — PSYLLIUM HUSK 2 PACKET: 3.4 POWDER ORAL at 02:03

## 2020-03-20 RX ADMIN — SMOFLIPID: 6; 6; 5; 3 INJECTION, EMULSION INTRAVENOUS at 05:03

## 2020-03-20 RX ADMIN — ACETAMINOPHEN 650 MG: 325 TABLET ORAL at 08:03

## 2020-03-20 RX ADMIN — DIPHENOXYLATE HYDROCHLORIDE AND ATROPINE SULFATE 1 TABLET: 2.5; .025 TABLET ORAL at 08:03

## 2020-03-20 RX ADMIN — BUTALBITAL, ACETAMINOPHEN AND CAFFEINE 1 TABLET: 50; 325; 40 TABLET ORAL at 01:03

## 2020-03-20 RX ADMIN — DIPHENHYDRAMINE HYDROCHLORIDE 6.25 MG: 50 INJECTION INTRAMUSCULAR; INTRAVENOUS at 01:03

## 2020-03-20 RX ADMIN — ALBUTEROL SULFATE 2 PUFF: 90 AEROSOL, METERED RESPIRATORY (INHALATION) at 01:03

## 2020-03-20 NOTE — PROGRESS NOTES
[facility]  Pulmonology  Progress Note    Patient Name: Shara Hutchins  MRN: [id number]  Admission Date: 3/7/2020  Hospital Length of Stay: 11 days  Code Status: DNR  Attending Provider: Digna Griffin MD  Primary Care Provider: April Horton MD   Principal Problem: <principal problem not specified>    Subjective:     Interval History:     3/19/2020 - Stable overnight, no new issues reported.  Weight is up 2 kg from admit but that weight is up about 10 kg from last hospital stay.  No new respiratory complaints, remains dyspneic and on NC O2.chest X-ray yesterday was unchanged.  I/O negative 2.2 liters.  Had atrial fibrillation last night and is back in NSR on cardizem.    3/20/2020 - Feels beter today, on lasix drip.  Weight decreased 2.6 kg, I/O negative 6.3 liters yesterday.  No new complaints.    Review of Systems   Constitutional: Positive for malaise/fatigue and weight loss. Negative for chills, diaphoresis and fever.   HENT: Negative for congestion.    Eyes: Negative for pain.   Respiratory: Positive for cough and shortness of breath. Negative for hemoptysis, sputum production, wheezing and stridor.    Cardiovascular: Positive for orthopnea and leg swelling. Negative for chest pain, palpitations, claudication and PND.   Gastrointestinal: Positive for diarrhea. Negative for abdominal pain, constipation, heartburn, nausea and vomiting.        Ostomy   Genitourinary: Negative for dysuria, frequency, hematuria and urgency.   Musculoskeletal: Negative for falls and myalgias.   Neurological: Positive for weakness (generalized). Negative for sensory change and focal weakness.   Psychiatric/Behavioral: Negative for depression and suicidal ideas. The patient is not nervous/anxious.          Objective:     Vital Signs (Most Recent):  Temp: 98.8 °F (37.1 °C) (03/20/20 1535)  Pulse: 96 (03/20/20 1535)  Resp: (!) 32 (03/20/20 1535)  BP: 135/72 (03/20/20 1535)  SpO2: (!) 92 % (03/20/20 1535) Vital Signs (24h  Range):  Temp:  [98.2 °F (36.8 °C)-100 °F (37.8 °C)] 98.8 °F (37.1 °C)  Pulse:  [] 96  Resp:  [20-69] 32  SpO2:  [91 %-96 %] 92 %  BP: (128-168)/(60-80) 135/72     Weight: 63.4 kg (139 lb 12.4 oz)  Body mass index is 21.57 kg/m².      Intake/Output Summary (Last 24 hours) at 3/20/2020 1638  Last data filed at 3/20/2020 1420  Gross per 24 hour   Intake 2242.97 ml   Output 8850 ml   Net -6607.03 ml       Physical Exam   Constitutional: She is oriented to person, place, and time. She appears well-developed and well-nourished. No distress.   Chronically ill female, nad  Sitting in bed   HENT:   Head: Normocephalic and atraumatic.   Right Ear: External ear normal.   Left Ear: External ear normal.   Nose: Nose normal.   Mouth/Throat: Oropharynx is clear and moist.   Eyes: Pupils are equal, round, and reactive to light. Conjunctivae and EOM are normal.   Neck: Normal range of motion. Neck supple. No JVD present. No tracheal deviation present. No thyromegaly present.   Cardiovascular: Normal rate, regular rhythm, normal heart sounds and intact distal pulses. Exam reveals no gallop and no friction rub.   No murmur heard.  Pulmonary/Chest: Effort normal. No stridor. No respiratory distress. She has no wheezes. She has rales. She exhibits no tenderness.   Decreased at bases   Abdominal: Soft. Bowel sounds are normal. She exhibits no distension. There is no tenderness. There is no rebound and no guarding.   + ostomy   Musculoskeletal: Normal range of motion. She exhibits edema (in dependent areas). She exhibits no tenderness.   Lymphadenopathy:     She has no cervical adenopathy.   Neurological: She is alert and oriented to person, place, and time. No cranial nerve deficit.   Skin: Skin is warm and dry. She is not diaphoretic.   Psychiatric: She has a normal mood and affect. Her behavior is normal.   Nursing note and vitals reviewed.      Vents:  Oxygen Concentration (%): 45 (03/20/20 0702)    Lines/Drains/Airways      Peripherally Inserted Central Catheter Line            PICC Double Lumen 03/06/20 1154 other (see comments) 14 days          Drain                 Hemodialysis AV Fistula Left upper arm -- days         Ileostomy 02/13/20 1600 Loop RLQ 35 days         Urethral Catheter 03/16/20 2230 Straight-tip 18 Fr. 3 days                Significant Labs:    CBC/Anemia Profile:  Recent Labs   Lab 03/19/20  0425 03/20/20  0557   WBC 11.56 11.93   HGB 7.6* 8.1*   HCT 24.2* 27.7*    340   MCV 97 116*   RDW 16.8* 17.5*        Chemistries:  Recent Labs   Lab 03/19/20  0425 03/19/20  1735 03/20/20  1027   *  135*  --  135*   K 3.2*  3.2* 3.9 4.4     102  --  99   CO2 26  26  --  26   BUN 26*  26*  --  29*   CREATININE 1.8*  1.8*  --  1.7*   CALCIUM 7.9*  7.9*  --  8.2*   ALBUMIN 2.0*  --  2.0*   PROT 5.4*  --  5.4*   BILITOT 1.4*  --  1.3*   ALKPHOS 177*  --  216*   ALT 11  --  12   AST 17  --  23   MG 2.3  --  2.0   PHOS 3.7  --  2.7       All pertinent labs within the past 24 hours have been reviewed.    BNP - 1502    Microbiology Results (last 7 days)     Procedure Component Value Units Date/Time    Urine Culture High Risk [[id number]]  (Abnormal)  (Susceptibility) Collected:  03/15/20 2350    Order Status:  Completed Specimen:  Urine, Catheterized Updated:  03/17/20 0632     Urine Culture, Routine KLEBSIELLA PNEUMONIAE  >100,000 cfu/ml      Narrative:       Indicated criteria for high risk culture:->Other  Other (specify):->sick            Significant Imaging:  I have reviewed and interpreted all pertinent imaging results/findings within the past 24 hours.     CXR (3/18) - about the same, bilateral infiltrates c/w fluid  CXR (3/20) - about the same    ECHO (3/19)  · Mild concentric left ventricular hypertrophy.  · Pulmonary hypertension present.  · Normal left ventricular systolic function. The estimated ejection fraction is 60%.  · Grade I (mild) left ventricular diastolic dysfunction consistent with  impaired relaxation.  · No wall motion abnormalities.  · Normal right ventricular systolic function.  · Intermediate central venous pressure (8 mmHg).  · Trivial pericardial effusion.  · There is a left pleural effusion.  · The estimated PA systolic pressure is 47 mmHg.    Assessment/Plan:     Acute hypoxemic respiratory failure  · Due to pulmonary edema and volume overload  · Continue diuresis - better  · Minimize fluids  · O2 to keep sats > 90%  · Prn albuterol  · Check weight daily    Pulmonary hypertension  · I suspect group 2 (diastolic dysfunction)  · Follow, no acute treatment needed    Malnutrition  · Fairly severe, follow  · On TPN but she states that she may be weaned off soon  · Note that last prealbumin was still low    Debility  · PT, OT as able    Unstageable pressure injury of skin and tissue  · Continue with local care    DNR (do not resuscitate)  · Aware     S/P ileostomy  · Aware     Chronic diarrhea  · S/p ostomy  · Pt reports that stools have more substance    Stage 5 chronic kidney disease not on chronic dialysis  · Aware     Iron deficiency anemia due to chronic blood loss  · Aware, has some minor blood in lee  · Transfuse for hgb < 7    Inflammatory bowel disease  · Aware            Moo Maharaj MD  Pulmonology  [facility]

## 2020-03-20 NOTE — PROGRESS NOTES
Interval History: Ms. Hutchins is is running a temperature today of 100. She had diuresed well. She has put out over 5 liters. Creatinine is improved to 1.7, Baseline is 1.6. BNP is more elevated at 1502, CXR shows mildly improved.  Her EF is 60% Mild Diastolic Dysfunction. She has Pulmonary HTN PA Pressure 47.        Review of Systems       + Fever  + SOB, cough and scant sputum production  No blood in the urine or stool  No myalgias  No recent arm, neck, or jaw pain  No lightheadedness or dizziness  No Double vision, blurry, vision or headache     Objective:     Vital Signs (Most Recent):  Temp: 100 °F (37.8 °C) (03/20/20 0832)  Pulse: 109 (03/20/20 0702)  Resp: (!) 24 (03/20/20 0702)  BP: (!) 163/75 (03/20/20 0715)  SpO2: (!) 94 % (03/20/20 0702) Vital Signs (24h Range):  Temp:  [98.2 °F (36.8 °C)-100 °F (37.8 °C)] 100 °F (37.8 °C)  Pulse:  [] 109  Resp:  [19-69] 24  SpO2:  [91 %-96 %] 94 %  BP: (123-168)/(65-80) 163/75     Weight: 63.4 kg (139 lb 12.4 oz)  Body mass index is 21.57 kg/m².     SpO2: (!) 94 %  O2 Device (Oxygen Therapy): nasal cannula w/ humidification      Intake/Output Summary (Last 24 hours) at 3/20/2020 1059  Last data filed at 3/20/2020 0833  Gross per 24 hour   Intake 2242.97 ml   Output 9900 ml   Net -7657.03 ml       Lines/Drains/Airways     Peripherally Inserted Central Catheter Line            PICC Double Lumen 03/06/20 1154 other (see comments) 13 days          Drain                 Hemodialysis AV Fistula Left upper arm -- days         Ileostomy 02/13/20 1600 Loop RLQ 35 days         Urethral Catheter 03/16/20 2230 Straight-tip 18 Fr. 3 days                   albuterol  2 puff Inhalation Q6H WAKE    cefTRIAXone (ROCEPHIN) IVPB  1 g Intravenous Q24H    diphenhydrAMINE  6.25 mg Intravenous Q6H    diphenoxylate-atropine 2.5-0.025 mg  1 tablet Oral QID    dronabinoL  2.5 mg Oral BID AC    enoxparin  30 mg Subcutaneous Q24H    epoetin nica-ebpx (RETACRIT) injection  100 Units/kg  Subcutaneous Q7 Days    levothyroxine  88 mcg Oral Before breakfast    loperamide  4 mg Oral QID    magnesium oxide  400 mg Oral BID    pantoprazole  40 mg Intravenous BID    psyllium husk (with sugar)  2 packet Oral TID    sertraline  50 mg Oral Daily    sodium chloride  1 spray Each Nostril Daily         PHYSICAL EXAM:  Constitutional: Frail thin female chronically ill   Neck: no carotid bruit, no JVD  Lungs: diminished, rales improving coarse rhonchi present.  Chest Wall:   Heart: mild tachycardia  No murmur or gallop  Abdomen: soft, non-tender; bowel sounds normal; no masses,  no organomegaly  Extremities: Extremities normal, no edema  Neuro: AAO X 3    I HAVE REVIEWED :    The vital signs, nurses notes, and all the pertinent radiology and labs.       Significant Labs:   Results for UMAIR CHA (MRN [id number]) as of 3/20/2020 10:58   Ref. Range 3/20/2020 05:57 3/20/2020 10:27   WBC Latest Ref Range: 3.90 - 12.70 K/uL 11.93    RBC Latest Ref Range: 4.00 - 5.40 M/uL 2.39 (L)    Hemoglobin Latest Ref Range: 12.0 - 16.0 g/dL 8.1 (L)    Hematocrit Latest Ref Range: 37.0 - 48.5 % 27.7 (L)    MCV Latest Ref Range: 82 - 98 fL 116 (H)    MCH Latest Ref Range: 27.0 - 31.0 pg 33.9 (H)    MCHC Latest Ref Range: 32.0 - 36.0 g/dL 29.2 (L)    RDW Latest Ref Range: 11.5 - 14.5 % 17.5 (H)    Platelets Latest Ref Range: 150 - 350 K/uL 340    MPV Latest Ref Range: 9.2 - 12.9 fL 11.9    Sodium Latest Ref Range: 136 - 145 mmol/L  135 (L)   Potassium Latest Ref Range: 3.5 - 5.1 mmol/L  4.4   Chloride Latest Ref Range: 95 - 110 mmol/L  99   CO2 Latest Ref Range: 23 - 29 mmol/L  26   Anion Gap Latest Ref Range: 8 - 16 mmol/L  10   BUN, Bld Latest Ref Range: 8 - 23 mg/dL  29 (H)   Creatinine Latest Ref Range: 0.5 - 1.4 mg/dL  1.7 (H)   eGFR if non African American Latest Ref Range: >60 mL/min/1.73 m^2  30.3 (A)   eGFR if African American Latest Ref Range: >60 mL/min/1.73 m^2  35.0 (A)   Glucose Latest Ref Range: 70 - 110 mg/dL   140 (H)   Calcium Latest Ref Range: 8.7 - 10.5 mg/dL  8.2 (L)   Phosphorus Latest Ref Range: 2.7 - 4.5 mg/dL  2.7   Magnesium Latest Ref Range: 1.6 - 2.6 mg/dL  2.0   Alkaline Phosphatase Latest Ref Range: 55 - 135 U/L  216 (H)   PROTEIN TOTAL Latest Ref Range: 6.0 - 8.4 g/dL  5.4 (L)   Albumin Latest Ref Range: 3.5 - 5.2 g/dL  2.0 (L)   BILIRUBIN TOTAL Latest Ref Range: 0.1 - 1.0 mg/dL  1.3 (H)   AST Latest Ref Range: 10 - 40 U/L  23   ALT Latest Ref Range: 10 - 44 U/L  12       Significant Imaging:   PROCEDURE:   XR CHEST AP PORTABLE  dated  3/20/2020 5:13 AM    CLINICAL HISTORY:   Female 69 years of age.   SOB    TECHNIQUE: AP view of the chest obtained portably at 5:13 AM.    PREVIOUS STUDIES:  March 18, 2020    FINDINGS:    Right IJ central vascular catheter tip is in the SVC. Cardiac  mediastinal contours are stable. The interstitial opacities and  groundglass throughout the right lung and primarily within the left  mid to upper lung are unchanged, allowing for slightly different angle  of projection. Small bilateral pleural effusions are stable. Cardiac  mediastinal contours are normal. Mild broad thoracolumbar  dextroscoliosis is unchanged in    .  IMPRESSION:    No change. Right greater than left interstitial opacities and ground  glass. Small bilateral pleural effusions.    Electronically Signed by Michaela Faye on 3/20/2020 7:29 AM        · Mild concentric left ventricular hypertrophy.  · Pulmonary hypertension present.  · Normal left ventricular systolic function. The estimated ejection fraction is 60%.  · Grade I (mild) left ventricular diastolic dysfunction consistent with impaired relaxation.  · No wall motion abnormalities.  · Normal right ventricular systolic function.  · Intermediate central venous pressure (8 mmHg).  · Trivial pericardial effusion.  · There is a left pleural effusion.  · The estimated PA systolic pressure is 47 mmHg.      EXAMINATION:  CT CHEST WITHOUT CONTRAST    CLINICAL  HISTORY:  Shortness of breath;Asphyxia and hypoxemia;.    TECHNIQUE:  CMS MANDATED QUALITY DATA - CT RADIATION - 436    All CT scans at this facility utilize dose modulation, iterative reconstruction, and/or weight based dosing when appropriate to reduce radiation dose to as low as reasonably achievable    Axial imaging through the chest was performed from the thoracic inlet to the adrenal glands. No IV contrast was administered.    COMPARISON:  Chest radiograph, March 18. CT chest June 2019    FINDINGS:  The heart is mildly enlarged.  There is a small pericardial effusion.  The ascending aorta is ectatic at up to 3.8 cm in transverse diameter, similar to the prior CT.  The descending thoracic aorta measures up to 3 cm in transverse diameter.  There is dense atherosclerotic calcification of the left anterior descending and circumflex coronary arteries.  Multiple small mediastinal lymph nodes are noted, similar in appearance to the prior CT.    Images at lung windows demonstrate diffuse abnormal bilateral interstitial prominence, with multifocal bilateral ground-glass pulmonary opacities.  Findings suggest pulmonary edema, with pneumonia felt to reflect a less likely differential possibility.  There is mild bilateral bronchiectasis, perhaps slightly increased when compared to June 2019.  Small left and small to moderate-sized right pleural effusions are noted.    No acute osseous abnormalities are identified.    Diffuse body wall edema is noted.  Images of the upper abdomen demonstrate nonobstructing bilateral renal calculi.      Impression       1. Diffuse bilateral abnormal interstitial prominence with multifocal bilateral ground-glass opacities, favored to reflect pulmonary edema.  Pneumonia is felt less likely, but not absolutely excluded.  2. Small left and small to moderate right pleural effusions.  3. Small pericardial effusion.  4. Dense coronary artery atherosclerotic calcification.  5. Ectasia of the  thoracic aorta as discussed, stable.  6. Additional findings as above.      Electronically signed by: Dixon Thomas MD  Date: 03/19/2020  Time: 16:07         I HAVE REVIEWED :    The vital signs, nurses notes, and all the pertinent radiology and labs.       ASSESSMENT & PLAN:    1. SOB and Hypoxemia  2. Elevated BNP-1253.- more elevated at 1502  3. CXR show hypervolemia atypical pneumonia or developing ARDs on yesterday  4. Anemia and hematuria  5. Klebsiella Pneumoniae to urine  6. DARCI on CKD Stage III  7. Unstageable pressure ulcer  8. Inflammatory Bowel Disease  9. Thyroid Dysfunction  10. S/P Ileostomy  11. H/O Vulvar Cancer   12. Hypokalemia  13. Low Grade Fever  14. Grade 1 Diastolic Dysfunction, Normal LVEF 60%  15. Mild-Moderate PH PA Pressure 47     Plan:  Continue lasix gtt for one more day  Change to IV Lasix 40 mg IV BID x 1 day, then torsemide PO 20 daily and Spirinolactone at low doses if ok with renal  She required Bipap again this morning with saturations in the 70s  Patient remains tachycardic and hemoglobin is 8.1 if she continues to drop further may consider packed red cell transfusion.  Fever is 100 this am  Blood count mildly improved  No further recommendations         I have personally interviewed and examined the patient, I have reviewed the Nurse Practitioner's history and physical, assessment, and plan. I have personally evaluated the patient at bedside and agree with the findings.

## 2020-03-20 NOTE — ASSESSMENT & PLAN NOTE
· Fairly severe, follow  · On TPN but she states that she may be weaned off soon  · Note that last prealbumin was still low

## 2020-03-20 NOTE — PLAN OF CARE
Problem: Physical Therapy Goal  Goal: Physical Therapy Goal  Description  Goals to be met by: D/C    Patient will increase functional independence with mobility by performin. Supine to sit with Modified [place]  2. Sit to stand transfer with Stand-by Assistance  3. Gait  x 100 feet with Stand-by Assistance using Rolling Walker.      Outcome: Ongoing, Progressing   Pt continues to progress towards goals. Limited 2/2 desaturation with exertion.

## 2020-03-20 NOTE — PLAN OF CARE
03/20/20 0702   Patient Assessment/Suction   Level of Consciousness (AVPU) alert   Respiratory Effort Normal;Unlabored   All Lung Fields Breath Sounds diminished   PRE-TX-O2   O2 Device (Oxygen Therapy) BiPAP   $ Is the patient on Low Flow Oxygen? Yes   Oxygen Concentration (%) 45   SpO2 (!) 94 %   Pulse Oximetry Type Continuous   $ Pulse Oximetry - Multiple Charge Pulse Oximetry - Multiple   Pulse 109   Resp (!) 24   Inhaler   $ Inhaler Charges   (aLB)   Daily Review of Necessity (Inhaler) completed   Respiratory Treatment Status (Inhaler) given   Treatment Route (Inhaler) mouthpiece   Patient Position (Inhaler) HOB elevated   Post Treatment Assessment (Inhaler) breath sounds improved   Signs of Intolerance (Inhaler) none   Ready to Wean/Extubation Screen   FIO2<=50 (chart decimal) 0.45   Preset CPAP/BiPAP Settings   Mode Of Delivery BiPAP   $ CPAP/BiPAP Daily Charge BiPAP/CPAP Daily   Equipment Type V60   Ipap 12   EPAP (cm H2O) 6   Pressure Support (cm H2O) 6   Set Rate (Breaths/Min) 12   Patient CPAP/BiPAP Settings   RR Total (Breaths/Min) 24   Tidal Volume (mL) 560   Respiratory Evaluation   $ Care Plan Tech Time 15 min

## 2020-03-20 NOTE — PROGRESS NOTES
"[facility]  Adult Nutrition   Progress Note (Nutrition Support Management)    SUMMARY     Recommendations  Recommendation/Intervention:   1. New TPN ordered to be initiated @ 1700.   2. Recommend NPO status if ostomy output does not improve. Continued high output may worsen renal function.     Goals: 1. Patient to tolerate TPN and TPN provision to meet needs.   Nutrition Goal Status: progressing towards goal  Communication of RD Recs: reviewed with RN    Dietitian Rounds Brief  TPN to continue. New TPN ordered.    Reason for Assessment  Reason For Assessment: new TPN, RD follow-up    Nutrition Risk Screen  Nutrition Risk Screen: tube feeding or parenteral nutrition       Ileostomy 02/13/20 1600 Loop RLQ-Wound Image: Images linked       Pressure Injury 02/27/20 1837 Left Buttocks Stage 2-Wound Image: Images linked  MST Score: 0  Have you recently lost weight without trying?: No(not since coming to hospital)  Weight loss score: 0  Have you been eating poorly because of a decreased appetite?: No  Appetite score: 0     Nutrition/Diet History  Spiritual, Cultural Beliefs, Baptism Practices, Values that Affect Care: no  Food Allergies: NKFA  Factors Affecting Nutritional Intake: malabsorption, altered gastrointestinal function  Nutrition Support Formula Prior to Admit: Other (see comments)(none)    Anthropometrics  Temp: 98.9 °F (37.2 °C)  Height Method: Stated  Height: 5' 7.5" (171.5 cm)  Height (inches): 67.5 in  Weight Method: Bed Scale  Weight: 63.4 kg (139 lb 12.4 oz)  Weight (lb): 139.77 lb  Ideal Body Weight (IBW), Female: 137.5 lb  % Ideal Body Weight, Female (lb): 101.81 %  BMI (Calculated): 21.6  BMI Grade: 18.5-24.9 - normal     Weight History:  Wt Readings from Last 10 Encounters:   03/20/20 63.4 kg (139 lb 12.4 oz)   03/06/20 53 kg (116 lb 13.5 oz)   03/07/20 52.6 kg (116 lb)   03/03/20 53 kg (116 lb 13.5 oz)   02/19/20 58.7 kg (129 lb 4.8 oz)   02/19/20 58.8 kg (129 lb 9.6 oz)   02/14/20 " 56.7 kg (125 lb)   02/05/20 56.9 kg (125 lb 6.4 oz)   01/22/20 57.2 kg (126 lb)   01/08/20 61.4 kg (135 lb 6.4 oz)     Lab/Procedures/Meds: Pertinent Labs Reviewed  Clinical Chemistry:  Recent Labs   Lab 03/18/20  0453 03/19/20  0425  03/20/20  1027    135*  135*  --  135*   K 3.5 3.2*  3.2*   < > 4.4    102  102  --  99   CO2 29 26  26  --  26   * 153*  153*  --  140*   BUN 28* 26*  26*  --  29*   CREATININE 1.8* 1.8*  1.8*  --  1.7*   CALCIUM 8.1* 7.9*  7.9*  --  8.2*   PROT 5.0* 5.4*  --  5.4*   ALBUMIN 2.0* 2.0*  --  2.0*   BILITOT 1.4* 1.4*  --  1.3*   ALKPHOS 160* 177*  --  216*   AST 17 17  --  23   ALT 9* 11  --  12   ANIONGAP 7* 7*  7*  --  10   ESTGFRAFRICA 32.6* 32.6*  32.6*  --  35.0*   EGFRNONAA 28.3* 28.3*  28.3*  --  30.3*   MG 2.3 2.3  --  2.0   PHOS 4.1 3.7  --  2.7    < > = values in this interval not displayed.     CBC:   Recent Labs   Lab 03/20/20  0557   WBC 11.93   RBC 2.39*   HGB 8.1*   HCT 27.7*      *   MCH 33.9*   MCHC 29.2*     Lipid Panel:  Recent Labs   Lab 03/16/20  0525   TRIG 55     Cardiac Profile:  Recent Labs   Lab 03/19/20  0425 03/20/20  1027   BNP 1,253* 1,502*     Inflammatory Labs:  Recent Labs   Lab 03/19/20  1735   CRP 25.73*     Thyroid & Parathyroid:  Recent Labs   Lab 03/14/20  0504   TSH 3.770   FREET4 0.84     Medications: Pertinent Medications reviewed  Scheduled Meds:   albuterol  2 puff Inhalation Q6H WAKE    cefTRIAXone (ROCEPHIN) IVPB  1 g Intravenous Q24H    diphenhydrAMINE  6.25 mg Intravenous Q6H    diphenoxylate-atropine 2.5-0.025 mg  1 tablet Oral QID    dronabinoL  2.5 mg Oral BID AC    enoxparin  30 mg Subcutaneous Q24H    epoetin nica-ebpx (RETACRIT) injection  100 Units/kg Subcutaneous Q7 Days    levothyroxine  88 mcg Oral Before breakfast    loperamide  4 mg Oral QID    magnesium oxide  400 mg Oral BID    pantoprazole  40 mg Intravenous BID    psyllium husk (with sugar)  2 packet Oral TID     sertraline  50 mg Oral Daily    sodium chloride  1 spray Each Nostril Daily     Continuous Infusions:   dextrose 10 % in water (D10W)      dilTIAZem 5 mg/hr (03/19/20 0045)    furosemide (LASIX) 2 mg/mL continuous infusion (non-titrating) 5 mg/hr (03/19/20 1737)    TPN ADULT CENTRAL LINE CUSTOM (3 in 1) 56 mL/hr at 03/19/20 1759    TPN ADULT CENTRAL LINE CUSTOM (3 in 1)       Estimated/Assessed Needs  Weight Used For Calorie Calculations: 66 kg (145 lb 8.1 oz)  Energy Calorie Requirements (kcal): 1980 - 2310 (30 - 35)   Energy Need Method: Kcal/kg  Protein Requirements: 99 - 132 (1.5 - 2)   Weight Used For Protein Calculations: 66 kg (145 lb 8.1 oz)     Estimated Fluid Requirement Method: RDA Method  RDA Method (mL): 1980     Nutrition Prescription Ordered  Current Diet Order: bland  Current Nutrition Support Formula Ordered: (Custom TPN)  Current Nutrition Support Rate Ordered: 55 (ml)  Current Nutrition Support Frequency Ordered: ml/hr    Evaluation of Received Nutrient/Fluid Intake  Parenteral Calories (kcal): 1927  Parenteral Protein (gm): 76  Parenteral Fluid (mL): 1320  IV Fluid (mL): 0  Energy Calories Required: meeting needs  Protein Required: meeting needs  Fluid Required: meeting needs  Comments: TPN to continue. New TPN ordered.  Tolerance: tolerating     Intake/Output Summary (Last 24 hours) at 3/20/2020 1209  Last data filed at 3/20/2020 0833  Gross per 24 hour   Intake 2242.97 ml   Output 9900 ml   Net -7657.03 ml      % Intake of Estimated Energy Needs: 75 - 100 %  % Meal Intake: NPO    Nutrition Risk  Level of Risk/Frequency of Follow-up: high     Monitor and Evaluation  Food and Nutrient Intake: food and beverage intake, energy intake, parenteral nutrition intake  Food and Nutrient Adminstration: diet order, enteral and parenteral nutrition administration  Physical Activity and Function: nutrition-related ADLs and IADLs, factors affecting access to physical activity  Anthropometric  Measurements: weight change, weight, body mass index  Biochemical Data, Medical Tests and Procedures: electrolyte and renal panel, glucose/endocrine profile, lipid profile, gastrointestinal profile, inflammatory profile  Nutrition-Focused Physical Findings: overall appearance, extremities, muscles and bones, head and eyes, skin     Nutrition Follow-Up  RD Follow-up?: Yes

## 2020-03-20 NOTE — CONSULTS
INPATIENT NEPHROLOGY CONSULT   [facility] NEPHROLOGY    Patient Name: Shara Hutchins  Date: 03/20/2020    Reason for consultation: DARCI/CKD    Chief Complaint: Diarrhea    History of Present Illness:  70 y/o F with hx of HTN, NIDDM-2, CKD-3, vulvar cancer (s/p chemo/radiation), and now s/p ileostomy after pneumatosis intestinalis earlier in 2020 and with persistent diarrhea 2/2 chemotherapy who p/w high ostomy output, treated with TPN/octreotide, awaiting LTAC, developed gross hematuria 3/13, noted to have bump in Cr and Hb drop, consulted for DARCI and anemia.    3/17 VSS, no new complains.  3/18 VSS, no new complains. UO 3L, ostomy 900, not sure what blood output 1,800ml is...  3/19 VSS, no new complains. UO 3.4L, ostomy 1L.  3/19 VSS, no new complains. UO 7L, ostomy 1.8L.    Plan of Care:    1. DARCI/CKD III  - sCr seem better, baseline is 1.6  - no nsaids or IV contrast    2. Chronic volume depletion 2/2 diarrhea  - monitor ostomy output  - on TPN  - on multiple stool bulking agents, loperamide; surgery in future    3. Gross hematuria  - persistent, plan per urology. Most likely cause for the persistent anemia.    4. Acute blood loss anemia on chronic anemia of CKD and chronic illness  - transfused 1 unit of blood on 3/15.  - ADAM weekly 100 units/kg SQ.  - will defer further blood transfusions to primary team and Urology.    5. Edema  - Apprecaite cards input.    Thank you for allowing us to participate in this patient's care. We will continue to follow.    Vital Signs:  Temp Readings from Last 3 Encounters:   03/20/20 100 °F (37.8 °C) (Oral)   03/07/20 98.2 °F (36.8 °C) (Oral)   03/05/20 97.8 °F (36.6 °C) (Oral)       Pulse Readings from Last 3 Encounters:   03/20/20 109   03/07/20 (!) 57   03/05/20 61       BP Readings from Last 3 Encounters:   03/20/20 (!) 163/75   03/07/20 131/61   03/05/20 (!) 169/74       Weight:  Wt Readings from Last 3 Encounters:   03/20/20 63.4 kg (139 lb 12.4 oz)   03/06/20 53 kg (116 lb 13.5  oz)   03/07/20 52.6 kg (116 lb)       Past Medical & Surgical History:  Past Medical History:   Diagnosis Date    Chronic kidney disease, stage III (moderate) 1/7/2018    Diabetes mellitus, type 2     Fatigue     Hypertension     Iron deficiency anemia due to chronic blood loss 1/7/2018    Vulvar cancer 1/7/2019       Past Surgical History:   Procedure Laterality Date    ABDOMINAL SURGERY      BACK SURGERY      carpel tunnel surgery once on each hand      CERVICAL FUSION      x 4    cervix repair      CHOLECYSTECTOMY  2000    DIAGNOSTIC LAPAROSCOPY N/A 2/13/2020    Procedure: LAPAROSCOPY, DIAGNOSTIC;  Surgeon: Robbi Lovell III, MD;  Location: [facility];  Service: General;  Laterality: N/A;    HYSTERECTOMY  1980    ILEOSTOMY N/A 2/13/2020    Procedure: CREATION, ILEOSTOMY Loop;  Surgeon: Robbi Lovell III, MD;  Location: [facility];  Service: General;  Laterality: N/A;    LUMBAR LAMINECTOMY      LYSIS OF ADHESIONS N/A 2/13/2020    Procedure: LYSIS, ADHESIONS;  Surgeon: Robbi Lovell III, MD;  Location: [facility];  Service: General;  Laterality: N/A;    NECK SURGERY      PHLEBOGRAPHY  2/28/2020    Procedure: VENOGRAM;  Surgeon: Robbi Lovell III, MD;  Location: [facility];  Service: General;;    REMOVAL OF VASCULAR ACCESS PORT Right 2/13/2020    Procedure: REMOVAL, VASCULAR ACCESS PORT;  Surgeon: Robbi Lovell III, MD;  Location: [facility];  Service: General;  Laterality: Right;    SHOULDER SURGERY      vulva cancer         Past Social History:  Social History     Socioeconomic History    Marital status:      Spouse name: Not on file    Number of children: Not on file    Years of education: Not on file    Highest education level: Not on file   Occupational History    Not on file   Social Needs    Financial resource strain: Not on file    Food insecurity:     Worry: Not on file     Inability: Not on file    Transportation needs:     Medical: Not on file      Non-medical: Not on file   Tobacco Use    Smoking status: Former Smoker     Packs/day: 1.00     Years: 33.00     Pack years: 33.00     Types: Cigarettes     Last attempt to quit: 2000     Years since quittin.6    Smokeless tobacco: Never Used   Substance and Sexual Activity    Alcohol use: No    Drug use: No    Sexual activity: Not on file   Lifestyle    Physical activity:     Days per week: Not on file     Minutes per session: Not on file    Stress: Not on file   Relationships    Social connections:     Talks on phone: Not on file     Gets together: Not on file     Attends Bahai service: Not on file     Active member of club or organization: Not on file     Attends meetings of clubs or organizations: Not on file     Relationship status: Not on file   Other Topics Concern    Not on file   Social History Narrative    Not on file       Medications:  Scheduled Meds:   albuterol  2 puff Inhalation Q6H WAKE    cefTRIAXone (ROCEPHIN) IVPB  1 g Intravenous Q24H    diphenhydrAMINE  6.25 mg Intravenous Q6H    diphenoxylate-atropine 2.5-0.025 mg  1 tablet Oral QID    dronabinoL  2.5 mg Oral BID AC    enoxparin  30 mg Subcutaneous Q24H    epoetin nica-ebpx (RETACRIT) injection  100 Units/kg Subcutaneous Q7 Days    levothyroxine  88 mcg Oral Before breakfast    loperamide  4 mg Oral QID    magnesium oxide  400 mg Oral BID    pantoprazole  40 mg Intravenous BID    potassium chloride  40 mEq Oral BID    psyllium husk (with sugar)  2 packet Oral TID    sertraline  50 mg Oral Daily    sodium chloride  1 spray Each Nostril Daily     Continuous Infusions:   dextrose 10 % in water (D10W)      dilTIAZem 5 mg/hr (20 0045)    furosemide (LASIX) 2 mg/mL continuous infusion (non-titrating) 5 mg/hr (20 1737)    TPN ADULT CENTRAL LINE CUSTOM (3 in 1) 56 mL/hr at 20 1759     PRN Meds:.sodium chloride, acetaminophen, butalbital-acetaminophen-caffeine -40 mg, calcium chloride  IVPB, calcium chloride IVPB, calcium chloride IVPB, calcium chloride IVPB, calcium chloride IVPB, calcium chloride IVPB, cyclobenzaprine, dextrose 10 % in water (D10W), dextrose 50%, dextrose 50%, glucagon (human recombinant), glucose, glucose, HYDROcodone-acetaminophen, hyoscyamine, magnesium oxide, magnesium oxide, magnesium sulfate IVPB, magnesium sulfate IVPB, magnesium sulfate IVPB, magnesium sulfate IVPB, magnesium sulfate IVPB, magnesium sulfate IVPB, magnesium sulfate IVPB, magnesium sulfate IVPB, melatonin, ondansetron, potassium chloride in water, potassium chloride in water, potassium chloride in water, potassium chloride in water, potassium chloride in water, potassium chloride in water, potassium chloride in water, potassium chloride in water, potassium chloride, potassium chloride, potassium chloride, potassium chloride, potassium chloride, potassium chloride, potassium chloride, potassium chloride, sodium chloride 0.9%, sodium phosphate IVPB, sodium phosphate IVPB, sodium phosphate IVPB, sodium phosphate IVPB, sodium phosphate IVPB, sodium phosphate IVPB, sodium phosphate IVPB, sodium phosphate IVPB, sodium phosphate IVPB, sodium phosphate IVPB  No current facility-administered medications on file prior to encounter.      Current Outpatient Medications on File Prior to Encounter   Medication Sig Dispense Refill    acetaminophen (TYLENOL) 325 MG tablet Take 2 tablets (650 mg total) by mouth every 4 (four) hours as needed.  0    cholestyramine-aspartame (QUESTRAN LIGHT) 4 gram PwPk Take 1 packet (4 g total) by mouth 2 (two) times daily. 180 packet 3    dextrose 50% injection Inject 25 mLs (12.5 g total) into the vein as needed (between 51 - 70 mg/dL if patient cannnot take PO but has IV access.).      dextrose 50% injection Inject 50 mLs (25 g total) into the vein as needed (less than 50 mg/dL if patient cannnot take PO but has IV access.).      diphenhydrAMINE (BENADRYL) 50 mg/mL injection Inject  "0.125 mLs (6.25 mg total) into the vein every 6 (six) hours.      glucose 4 GM chewable tablet Take 4 tablets (16 g total) by mouth as needed.  12    glucose 4 GM chewable tablet Take 6 tablets (24 g total) by mouth as needed.  12    HYDROcodone-acetaminophen (NORCO) 5-325 mg per tablet Take 1 tablet by mouth every 6 (six) hours as needed.  0    levothyroxine (SYNTHROID) 88 MCG tablet Take 1 tablet (88 mcg total) by mouth before breakfast. 30 tablet 11    melatonin (MELATIN) 3 mg tablet Take 3 tablets (9 mg total) by mouth nightly as needed for Insomnia.  0    ondansetron (ZOFRAN-ODT) 8 MG TbDL Take 1 tablet (8 mg total) by mouth every 8 (eight) hours as needed.      oxyCODONE-acetaminophen (PERCOCET) 5-325 mg per tablet Take 1 tablet by mouth every 4 (four) hours as needed.  0    pantoprazole (PROTONIX) 40 mg injection Inject 40 mg into the vein 2 (two) times daily. 2400 mg 11    psyllium husk, with sugar, 3.4 gram packet Take 2 packets by mouth 3 (three) times daily.      Ringer's solution,lactated (LACTATED RINGERS) infusion Inject 125 mL/hr into the vein continuous.         Allergies:  Ciprofloxacin and Pcn [penicillins]    Past Family History:  Reviewed; refer to Hospitalist Admission Note    Review of Systems:  Review of Systems - All 14 systems reviewed and negative, except as noted in HPI    Physical Exam:  BP (!) 163/75 (BP Location: Right arm, Patient Position: Lying)   Pulse 109   Temp 100 °F (37.8 °C) (Oral)   Resp (!) 24   Ht 5' 7.5" (1.715 m)   Wt 63.4 kg (139 lb 12.4 oz)   SpO2 (!) 94%   Breastfeeding? No   BMI 21.57 kg/m²     INS/OUTS:  I/O last 3 completed shifts:  In: 3088 [P.O.:680; I.V.:56; IV Piggyback:400]  Out: 25146 [Urine:9050; Stool:1800]  No intake/output data recorded.    General Appearance:    NAD, AAO x 3, cooperative, appears stated age   Head:    Normocephalic, atraumatic   Eyes:    PER, EOMI, and conjunctiva/sclera clear bilaterally        Mouth:   Moist mucus " membranes, no thrush or oral lesions, normal      dentition   Back:     No CVA tenderness   Lungs:     Clear to auscultation bilaterally, no wheeze, crackles, rales      or rhonchi, symmetric air movement, respirations unlabored   Heart:    Regular rate and rhythm, S1 and S2 normal, no murmur, rub   or gallop   Abdomen:     Soft, non-tender, non-distended, bowel sounds active all four   quadrants, no RT or guarding, no masses, no organomegaly   Extremities:   Warm and well perfused, distal pulses intact, no cyanosis or    peripheral edema   MSK:   No joint or muscle swelling, tenderness or deformity   Skin:   Skin color, texture, turgor normal, no rashes or lesions   Neurologic/Psychiatric:   CNII-XII intact, normal strength and sensation       throughout, no asterixis; normal affect, memory, judgement     and insight     Results:  Lab Results   Component Value Date     (L) 03/19/2020     (L) 03/19/2020    K 3.9 03/19/2020     03/19/2020     03/19/2020    CO2 26 03/19/2020    CO2 26 03/19/2020    BUN 26 (H) 03/19/2020    BUN 26 (H) 03/19/2020    CREATININE 1.8 (H) 03/19/2020    CREATININE 1.8 (H) 03/19/2020    CALCIUM 7.9 (L) 03/19/2020    CALCIUM 7.9 (L) 03/19/2020    ANIONGAP 7 (L) 03/19/2020    ANIONGAP 7 (L) 03/19/2020    ESTGFRAFRICA 32.6 (A) 03/19/2020    ESTGFRAFRICA 32.6 (A) 03/19/2020    EGFRNONAA 28.3 (A) 03/19/2020    EGFRNONAA 28.3 (A) 03/19/2020       Lab Results   Component Value Date    CALCIUM 7.9 (L) 03/19/2020    CALCIUM 7.9 (L) 03/19/2020    PHOS 3.7 03/19/2020       Recent Labs   Lab 03/20/20  0557   WBC 11.93   RBC 2.39*   HGB 8.1*   HCT 27.7*      *   MCH 33.9*   MCHC 29.2*       I have personally reviewed pertinent radiological imaging and reports.    Jorge Webb MD  [facility] [address]  [place] [address]  [phone] (p)  [phone] (f)

## 2020-03-20 NOTE — PLAN OF CARE
Problem: Parenteral Nutrition  Goal: Effective Intravenous Nutrition Therapy Delivery  Outcome: Ongoing, Progressing   New TPN ordered.

## 2020-03-20 NOTE — SUBJECTIVE & OBJECTIVE
Interval History:     3/19/2020 - Stable overnight, no new issues reported.  Weight is up 2 kg from admit but that weight is up about 10 kg from last hospital stay.  No new respiratory complaints, remains dyspneic and on NC O2.chest X-ray yesterday was unchanged.  I/O negative 2.2 liters.  Had atrial fibrillation last night and is back in NSR on cardizem.    3/20/2020 - Feels beter today, on lasix drip.  Weight decreased 2.6 kg, I/O negative 6.3 liters yesterday.  No new complaints.    Review of Systems   Constitutional: Positive for malaise/fatigue and weight loss. Negative for chills, diaphoresis and fever.   HENT: Negative for congestion.    Eyes: Negative for pain.   Respiratory: Positive for cough and shortness of breath. Negative for hemoptysis, sputum production, wheezing and stridor.    Cardiovascular: Positive for orthopnea and leg swelling. Negative for chest pain, palpitations, claudication and PND.   Gastrointestinal: Positive for diarrhea. Negative for abdominal pain, constipation, heartburn, nausea and vomiting.        Ostomy   Genitourinary: Negative for dysuria, frequency, hematuria and urgency.   Musculoskeletal: Negative for falls and myalgias.   Neurological: Positive for weakness (generalized). Negative for sensory change and focal weakness.   Psychiatric/Behavioral: Negative for depression and suicidal ideas. The patient is not nervous/anxious.          Objective:     Vital Signs (Most Recent):  Temp: 98.8 °F (37.1 °C) (03/20/20 1535)  Pulse: 96 (03/20/20 1535)  Resp: (!) 32 (03/20/20 1535)  BP: 135/72 (03/20/20 1535)  SpO2: (!) 92 % (03/20/20 1535) Vital Signs (24h Range):  Temp:  [98.2 °F (36.8 °C)-100 °F (37.8 °C)] 98.8 °F (37.1 °C)  Pulse:  [] 96  Resp:  [20-69] 32  SpO2:  [91 %-96 %] 92 %  BP: (128-168)/(60-80) 135/72     Weight: 63.4 kg (139 lb 12.4 oz)  Body mass index is 21.57 kg/m².      Intake/Output Summary (Last 24 hours) at 3/20/2020 1638  Last data filed at 3/20/2020  1420  Gross per 24 hour   Intake 2242.97 ml   Output 8850 ml   Net -6607.03 ml       Physical Exam   Constitutional: She is oriented to person, place, and time. She appears well-developed and well-nourished. No distress.   Chronically ill female, nad  Sitting in bed   HENT:   Head: Normocephalic and atraumatic.   Right Ear: External ear normal.   Left Ear: External ear normal.   Nose: Nose normal.   Mouth/Throat: Oropharynx is clear and moist.   Eyes: Pupils are equal, round, and reactive to light. Conjunctivae and EOM are normal.   Neck: Normal range of motion. Neck supple. No JVD present. No tracheal deviation present. No thyromegaly present.   Cardiovascular: Normal rate, regular rhythm, normal heart sounds and intact distal pulses. Exam reveals no gallop and no friction rub.   No murmur heard.  Pulmonary/Chest: Effort normal. No stridor. No respiratory distress. She has no wheezes. She has rales. She exhibits no tenderness.   Decreased at bases   Abdominal: Soft. Bowel sounds are normal. She exhibits no distension. There is no tenderness. There is no rebound and no guarding.   + ostomy   Musculoskeletal: Normal range of motion. She exhibits edema (in dependent areas). She exhibits no tenderness.   Lymphadenopathy:     She has no cervical adenopathy.   Neurological: She is alert and oriented to person, place, and time. No cranial nerve deficit.   Skin: Skin is warm and dry. She is not diaphoretic.   Psychiatric: She has a normal mood and affect. Her behavior is normal.   Nursing note and vitals reviewed.      Vents:  Oxygen Concentration (%): 45 (03/20/20 0702)    Lines/Drains/Airways     Peripherally Inserted Central Catheter Line            PICC Double Lumen 03/06/20 1154 other (see comments) 14 days          Drain                 Hemodialysis AV Fistula Left upper arm -- days         Ileostomy 02/13/20 1600 Loop RLQ 35 days         Urethral Catheter 03/16/20 2230 Straight-tip 18 Fr. 3 days                 Significant Labs:    CBC/Anemia Profile:  Recent Labs   Lab 03/19/20  0425 03/20/20  0557   WBC 11.56 11.93   HGB 7.6* 8.1*   HCT 24.2* 27.7*    340   MCV 97 116*   RDW 16.8* 17.5*        Chemistries:  Recent Labs   Lab 03/19/20  0425 03/19/20  1735 03/20/20  1027   *  135*  --  135*   K 3.2*  3.2* 3.9 4.4     102  --  99   CO2 26  26  --  26   BUN 26*  26*  --  29*   CREATININE 1.8*  1.8*  --  1.7*   CALCIUM 7.9*  7.9*  --  8.2*   ALBUMIN 2.0*  --  2.0*   PROT 5.4*  --  5.4*   BILITOT 1.4*  --  1.3*   ALKPHOS 177*  --  216*   ALT 11  --  12   AST 17  --  23   MG 2.3  --  2.0   PHOS 3.7  --  2.7       All pertinent labs within the past 24 hours have been reviewed.    BNP - 1502    Microbiology Results (last 7 days)     Procedure Component Value Units Date/Time    Urine Culture High Risk [[id number]]  (Abnormal)  (Susceptibility) Collected:  03/15/20 2350    Order Status:  Completed Specimen:  Urine, Catheterized Updated:  03/17/20 0632     Urine Culture, Routine KLEBSIELLA PNEUMONIAE  >100,000 cfu/ml      Narrative:       Indicated criteria for high risk culture:->Other  Other (specify):->sick            Significant Imaging:  I have reviewed and interpreted all pertinent imaging results/findings within the past 24 hours.     CXR (3/18) - about the same, bilateral infiltrates c/w fluid  CXR (3/20) - about the same    ECHO (3/19)  · Mild concentric left ventricular hypertrophy.  · Pulmonary hypertension present.  · Normal left ventricular systolic function. The estimated ejection fraction is 60%.  · Grade I (mild) left ventricular diastolic dysfunction consistent with impaired relaxation.  · No wall motion abnormalities.  · Normal right ventricular systolic function.  · Intermediate central venous pressure (8 mmHg).  · Trivial pericardial effusion.  · There is a left pleural effusion.  · The estimated PA systolic pressure is 47 mmHg.

## 2020-03-20 NOTE — PROGRESS NOTES
[facility] Medicine Progress Note    Patient Name: Shara Hutchins MRN: [id number]   Patient Class: IP- Inpatient  Length of Stay: 11   Admission Date: 3/7/2020  8:42 PM Attending Physician: Digna Griffin MD   Primary Care Provider: April Horton MD Face-to-Face encounter date: 03/20/2020   Chief Complaint: No chief complaint on file.        Subjective:    Hospital course:  Patient was admitted for severe dehydration and malnutrition with 20 lb weight loss at home due to high output from the ileostomy.  Patient was started on TPN and IV fluids.  Patient continued to have high output to the ileostomy with multiple anti-diarrheal agents.  Patient was started on octreotide with improved diarrhea.  Patient's symptoms improved during hospitalization.  Patient was tolerating diet and had improved nutrition.  Octreotide was discontinued however patient started having watery diarrhea and increased output.  Discussed with surgery who has concerns for short gut syndrome.  Plan for reversal of ileostomy in several weeks.  Adjusted patient's antidiarrhea medication with improved output from the ostomy.  Discontinue octreotide.  Clinical course complicated by recurrent DARCI and third-spacing causing pitting edema.  3/16:  Has increased shortness of breath.  Requiring 2 L nasal cannula saturating 94%.  Continues to have pitting edema and upper and lower extremities.  Suspect 3rd spacing.  DVT negative on ultrasound.  Stool is more formed today.  Continues to be off octreotide.  Transfuse 1 PRBC yesterday.  Started on EPO per Nephrology.  IV Lasix ordered.  Has good urine output.  Afebrile the past 24 hr.  Urine culture grew gram-negative estelle greater than 100,000.  Rocephin started.  Awaiting for LTAC placement.    3/17:Continued to be hypoxic saturating 88-90% on 4 l oxy   Overnight patient had acute urinary retention, Godwin changed.  Continued to have hematuria,with occasional clots, slowly Hb trending  "down    3/18:primarily worried about headache and neck pain  Also has loss of appetite,flexril added  3/19:pt desaturated to 78% on nc while trying to lye flat, HOB elevation with bipap improved saturation  Patient also noted to have AFib overnight placed on Cardizem drip, currently in sinus rhythm  Cardiology consult requested for possibly orthopnea causing hypoxia  Echo, unremarkable but BNP elevated  Started on Lasix drip    Interval History: .    Patient reports subjective improvement in her breathing  But continued to request 6 L for oxygen   Procalcitonin elevated, hemoglobin 8.1, will hold on blood transfusion  BNP trending up  Cardiology and pulmonology follow-up appreciated  Will continue Lasix drip for 1 more day  Will extend antibiotic coverage with levofloxacin for atypical pneumonia     Review of Systems All other Review of Systems were found to be negative expect for that mentioned already in HPI.     Objective:   Physical Exam  /72 (BP Location: Right arm, Patient Position: Lying)   Pulse 96   Temp 98.8 °F (37.1 °C) (Oral)   Resp (!) 32   Ht 5' 7.5" (1.715 m)   Wt 63.4 kg (139 lb 12.4 oz)   SpO2 (!) 92%   Breastfeeding? No   BMI 21.57 kg/m²   Vitals reviewed.    Constitutional: No acute distress.   HENT: Atraumatic. PERRLA  Cardiovascular: Normal rate, regular rhythm and normal heart sounds.   Pulmonary/Chest: Effort normal. She has no wheezes blt rales. S/p port   Abdominal: Soft. no tenderness,no distension,bowel sounds are normal  S/p ostomy ,lee with hematuria  Neurological: AOX3  Skin: Skin is warm and dry.     Following labs were Reviewed   Recent Labs   Lab 03/20/20  0557 03/20/20  1027   WBC 11.93  --    HGB 8.1*  --    HCT 27.7*  --      --    CALCIUM  --  8.2*   ALBUMIN  --  2.0*   PROT  --  5.4*   NA  --  135*   K  --  4.4   CO2  --  26   CL  --  99   BUN  --  29*   CREATININE  --  1.7*   ALKPHOS  --  216*   ALT  --  12   AST  --  23   BILITOT  --  1.3*     No results " found for: POCTGLUCOSE     Microbiology Results (last 7 days)     Procedure Component Value Units Date/Time    Urine Culture High Risk [[id number]]  (Abnormal)  (Susceptibility) Collected:  03/15/20 2350    Order Status:  Completed Specimen:  Urine, Catheterized Updated:  03/17/20 0632     Urine Culture, Routine KLEBSIELLA PNEUMONIAE  >100,000 cfu/ml      Narrative:       Indicated criteria for high risk culture:->Other  Other (specify):->sick        X-Ray Chest AP Portable   Final Result      CT Chest Without Contrast   Final Result      1. Diffuse bilateral abnormal interstitial prominence with multifocal bilateral ground-glass opacities, favored to reflect pulmonary edema.  Pneumonia is felt less likely, but not absolutely excluded.   2. Small left and small to moderate right pleural effusions.   3. Small pericardial effusion.   4. Dense coronary artery atherosclerotic calcification.   5. Ectasia of the thoracic aorta as discussed, stable.   6. Additional findings as above.         Electronically signed by: Dixon Thomas MD   Date:    03/19/2020   Time:    16:07      X-Ray Chest AP Portable   Final Result      No significant interval change in diffuse interstitial opacities, potentially reflecting acute congestive failure and or hypervolemia, viral or atypical pneumonia, and or developing ARDS.         Electronically signed by: Mike Martinez MD   Date:    03/18/2020   Time:    15:49      X-Ray Chest AP Portable   Final Result      Increasing diffuse bilateral lung opacity consistent with pulmonary edema, ARDS, or diffuse pneumonia         Electronically signed by: Maykel Schofield MD   Date:    03/16/2020   Time:    14:01      US Upper Extremity Veins Left   Final Result      Negative for left upper extremity deep venous thrombosis.         Electronically signed by: Mike Martinez MD   Date:    03/15/2020   Time:    10:59      X-Ray Chest AP Portable   Final Result      1. Slightly increasing airspace disease in the  left upper lobe when compared to March 10, with slightly increasing small left pleural effusion.  Additional stable findings as described.  Findings could be on the basis of pulmonary edema or pneumonia.         Electronically signed by: Dixon Thomas MD   Date:    03/13/2020   Time:    15:08      X-Ray Chest AP Portable   Final Result      1.  Findings of mild pulmonary vascular congestion/edema with trace bilateral pleural effusions.      2.  Right-sided IJ central venous catheter, which takes a hairpin turn at its distal tip at the level of the SVC.         Electronically signed by: Rashi Wilder MD   Date:    03/10/2020   Time:    17:43          Inpatient medications  Scheduled Meds:   albuterol  2 puff Inhalation Q6H WAKE    diphenhydrAMINE  6.25 mg Intravenous Q6H    diphenoxylate-atropine 2.5-0.025 mg  1 tablet Oral QID    dronabinoL  2.5 mg Oral BID AC    enoxparin  30 mg Subcutaneous Q24H    epoetin nica-ebpx (RETACRIT) injection  100 Units/kg Subcutaneous Q7 Days    levothyroxine  88 mcg Oral Before breakfast    loperamide  4 mg Oral QID    magnesium oxide  400 mg Oral BID    pantoprazole  40 mg Intravenous BID    psyllium husk (with sugar)  2 packet Oral TID    sertraline  50 mg Oral Daily    sodium chloride  1 spray Each Nostril Daily     Continuous Infusions:   dextrose 10 % in water (D10W)      dilTIAZem 5 mg/hr (03/19/20 0045)    furosemide (LASIX) 2 mg/mL continuous infusion (non-titrating) 5 mg/hr (03/19/20 1737)    TPN ADULT CENTRAL LINE CUSTOM (3 in 1) 56 mL/hr at 03/19/20 1759    TPN ADULT CENTRAL LINE CUSTOM (3 in 1) 55 mL/hr at 03/20/20 1711     PRN Meds:.sodium chloride, acetaminophen, butalbital-acetaminophen-caffeine -40 mg, calcium chloride IVPB, calcium chloride IVPB, calcium chloride IVPB, calcium chloride IVPB, calcium chloride IVPB, calcium chloride IVPB, cyclobenzaprine, dextrose 10 % in water (D10W), dextrose 50%, dextrose 50%, glucagon (human  recombinant), glucose, glucose, HYDROcodone-acetaminophen, hyoscyamine, magnesium oxide, magnesium oxide, magnesium sulfate IVPB, magnesium sulfate IVPB, magnesium sulfate IVPB, magnesium sulfate IVPB, magnesium sulfate IVPB, magnesium sulfate IVPB, magnesium sulfate IVPB, magnesium sulfate IVPB, melatonin, ondansetron, potassium chloride in water, potassium chloride in water, potassium chloride in water, potassium chloride in water, potassium chloride in water, potassium chloride in water, potassium chloride in water, potassium chloride in water, potassium chloride, potassium chloride, potassium chloride, potassium chloride, potassium chloride, potassium chloride, potassium chloride, potassium chloride, sodium chloride 0.9%, sodium phosphate IVPB, sodium phosphate IVPB, sodium phosphate IVPB, sodium phosphate IVPB, sodium phosphate IVPB, sodium phosphate IVPB, sodium phosphate IVPB, sodium phosphate IVPB, sodium phosphate IVPB, sodium phosphate IVPB      Assessment & Plan:   Shara Hutchins is a 69 y.o. female admitted for    Malnutrition  Continue TPN until ostomy output decreased  Trial of discontinue octreotide drip  On TPN for nutrition support with suspected short gut syndrome  Will need reversal of ostomy with General surgery in several weeks  Awaiting for LTAC placement  PT/OT  Resolved  thrombocytopenia  S/p 1 PRBC. On epo    Urinary retension with UTI  UCx -Klebsilla on rocephin  On lee  Urology eval appreciated    DARCI/CKD III  Renal function improved  Monitor urine output  Nephrology on board    Unstageable pressure injury of skin and tissue  Wound care ordered    Anemia of chronic disease  Transfuse 1 unit of prbc's  ADAM weekly 100 units/kg SQ.                     Core measures:  -DNR  - DVT PPx: bilateral SCDs   - lines/ tubes: iliostomy, Lees cath       Discharge Planning:   Awaiting for LTAC placement  .       Above encounter included review of the medical records, interviewing and examining the  patient face-to-face, discussion with family and other health care providers, ordering and interpreting lab/test results and formulating a plan of care.     Medical Decision Making:      [] Low Complexity  [x] Moderate Complexity  [] High Complexity

## 2020-03-20 NOTE — PLAN OF CARE
Problem: Parenteral Nutrition  Goal: Effective Intravenous Nutrition Therapy Delivery  Outcome: Ongoing, Progressing  Intervention: Optimize Intravenous Nutrition Delivery  Flowsheets (Taken 3/19/2020 1943)  Nutrition Support Management: parenteral nutrition composition adjusted

## 2020-03-20 NOTE — PLAN OF CARE
03/20/20 0833   Discharge Reassessment   Assessment Type Discharge Planning Reassessment   Anticipated Discharge Disposition LTAC   Discharge Plan A Long-term acute care facility (LTAC)   Discharge Plan B Skilled Nursing Facility  (Pt is not currently appropriate for SNF due to still being on TPN)   DME Needed Upon Discharge  none  (TBD at next level of care)   Post-Acute Status   Post-Acute Authorization Placement     Pt was denied by [facility] for LTAC placement on 3/18/20 due to being too medically unstable. LIN[facility]OD has been working with Eufemia from [employer], who believes they could work out a better financial arrangement with [facility] now that pt is on telemetry. LINWOOD sent [employer] updated clinicals this morning to determine if pt looks stable enough to re-submit for auth.    LINWOOD has also been pursuing SNF as a back-up plan in case pt comes off TPN. LINWOOD received denials yesterday from [facility] (unable to meet medical needs), [facility] (unable to meet medical needs), and [facility] (don't take her insurance). Pt still has pending referrals with [facility] ([phone]), [facility] ([phone]), and [facility]  ([phone]).    9:02 LINWOOD heard back from Elizabeth at [facility], who stated they can't meet pt's medical needs. LINWOOD also followed up with Basia from [facility] ([phone]), who stated LINWOOD should not send additional clinical information until Dr. Griffin says the pt looks ready to go. Auth is only good for 48 hours. Additionally, since auth was denied on 3/18/20 due to pt not yet being stable Basia states she is not certain but thinks this will need to be processed as an appeal when pt is ready to go. LINWOOD will follow back up with Basia when further clinical information is available.    11:49 LINWOOD spoke with Aj from [facility], who stated he would have his nurse review today and get back to this SW.     12:34 Per Nadira at [facility], they do  not feel comfortable accepting pt at this time due to her medical needs.    15:00 SW spoke with Dr. Griffin earlier this afternoon regarding pt's d/c plan. Dr. Griffin will be following up on dietary and will revisit pt's medical status on Monday, at which pt Dr. Griffin understands this SW will be communicating with pt's insurance to try to obtain auth for LTAC if still on TPN.

## 2020-03-20 NOTE — PT/OT/SLP PROGRESS
Physical Therapy Treatment    Patient Name:  Shara Hutchins   MRN:  [id number]    Recommendations:     Discharge Recommendations:  nursing facility, skilled   Discharge Equipment Recommendations: (TBD at next level of care)   Barriers to discharge: None    Assessment:     Shara Hutchins is a 69 y.o. female admitted with a medical diagnosis of <principal problem not specified>.  She presents with the following impairments/functional limitations:  weakness, impaired endurance, impaired self care skills, impaired functional mobilty.  Patient continues with desaturation with exertion. Pt on 5L O2 initially upon PTA entering and O2 sats noted to be 92%. While sitting EOB, patients sats decreased to 83% requiring use of bi-pap for patient to recover to 95%. Pt tolerate stand well requiring only CGA however pt only able to stand ~10 seconds before needing to sit 2/2 lightheadness. Lightheadedness slightly improved sitting EOB. Pt back in bed with HOB elevated and no reports of lightheadedness and O2 sats noted to be 92%. Continue with PT and POC.    Rehab Prognosis: Good; patient would benefit from acute skilled PT services to address these deficits and reach maximum level of function.    Recent Surgery: * No surgery found *      Plan:     During this hospitalization, patient to be seen 6 x/week to address the identified rehab impairments via gait training, therapeutic activities, therapeutic exercises and progress toward the following goals:    · Plan of Care Expires:  03/18/20    Subjective     Chief Complaint: SOB   Patient/Family Comments/goals:   Pain/Comfort:  · Pain Rating 1: 0/10      Objective:     Communicated with RN prior to session.  Patient found HOB elevated with blood pressure cuff, BIPAP, oxygen, lee catheter, colostomy, pulse ox (continuous), telemetry upon PT entry to room.     General Precautions: Standard, fall   Orthopedic Precautions:N/A   Braces:       Functional Mobility:  · Bed Mobility:     · Supine  to Sit: stand by assistance  · Sit to Supine: stand by assistance  · Transfers:     · Sit to Stand:  contact guard assistance with rolling walker      AM-PAC 6 CLICK MOBILITY          Therapeutic Activities and Exercises:   bed mobility; sitting EOB for trunk control and midline orientation; sit <> stands    Patient left HOB elevated with all lines intact, call button in reach and bed alarm on..    GOALS:   Multidisciplinary Problems     Physical Therapy Goals        Problem: Physical Therapy Goal    Goal Priority Disciplines Outcome Goal Variances Interventions   Physical Therapy Goal     PT, PT/OT Ongoing, Progressing     Description:  Goals to be met by: D/C    Patient will increase functional independence with mobility by performin. Supine to sit with Modified [place]  2. Sit to stand transfer with Stand-by Assistance  3. Gait  x 100 feet with Stand-by Assistance using Rolling Walker.                       Time Tracking:     PT Received On: 20  PT Start Time: 1053     PT Stop Time: 1106  PT Total Time (min): 13 min     Billable Minutes: Therapeutic Activity 13    Treatment Type: Treatment  PT/PTA: PTA     PTA Visit Number: 2     Amanda Ayers PTA  2020

## 2020-03-20 NOTE — PROGRESS NOTES
[facility]  Adult Nutrition   Progress Note (Nutrition Support Management)    SUMMARY     Recommendations  Recommendation/Intervention:   1. New TPN ordered to be initiated today @ 1700. New rate 56 ml/hr.   2. Consider Gattex (teduglutide) with dose adjustments for renal impairment. GATTEX® (teduglutide) for subcutaneous injection is a prescription medicine used in adults and children 1 year of age and older with Short Bowel Syndrome (SBS) who need additional nutrition or fluids from intravenous (IV) feeding (parenteral support).  3. RD believes other interventions should be considered to help decrease output, increase nutrient absorption, prevent fluid and electrolyte imbalances, and preserve renal function: NPO status if ostomy output remains high, discontinue appetite stimulant, start a probiotic, discontinue medications with sugar alcohols if medically able.   4. Blood glucose and electrolytes to be monitored and corrected accordingly.  5. RD to monitor PO intake, TPN infusion and tolerance. Monitor I/Os. Will continue to monitor and manage nutrition support daily.      Goals:   1. Patient to meet estimated needs via TPN provision.  2. Weight to be obtained and evaluated.  3. Additional interventions to be considered to help improve nutritional status.  4. Blood glucose and electrolyte to trendtowards normal limits.       Nutrition Goal Status: progressing towards goal     Dietitian Rounds Brief  TPN to conitnue. New TPN ordered. Lasix started. Suspicion of 3rd spacing. Awaiting LTAC placement. Possible considerations of discontinuation of TPN. RD believes this would not be appropriate at this time due to malnutrition, inadequate intake and suspected malabsorption due to high ostomy output with suspected SBS.       PARENTERAL NUTRITION PROGRESS NOTE:    Parenteral Nutrition Day # 19  Diagnosis/Indication for PN: high ostomy output, severe malnutrition   IV Access: PICC  Diet/Tube Feeding: bland  "diet with no sugars, sweets, or juice       Admixture Type: 3-in-1 TPN, custom (15% AA, 70% dextrose, 20% Smoflipid)   Infusion Rate and Frequency: 56 ml/hr   Patient Acuity: Acute care     PN Composition:   78 grams Amino Acid, 301 grams Dextrose, and 60 grams Lipid.    Today's TPN provides 1935 kcal.     Please see order for electrolytes and additives content and adjustments.   *The Nutrition Support Team will continue to monitor electrolytes daily and adjust parenteral nutrition as warranted.    Reason for Assessment  Reason For Assessment: new TPN, RD follow-up  Interdisciplinary Rounds: did not attend  Nutrition Discharge Planning: TPN; Oral diet as tolerated. Accurate I/Os.     Nutrition Risk Screen  Nutrition Risk Screen: tube feeding or parenteral nutrition       Ileostomy 02/13/20 1600 Loop RLQ-Wound Image: Images linked       Pressure Injury 02/27/20 1837 Left Buttocks Stage 2-Wound Image: Images linked  MST Score: 0  Have you recently lost weight without trying?: No(not since coming to hospital)  Weight loss score: 0  Have you been eating poorly because of a decreased appetite?: No  Appetite score: 0       Nutrition/Diet History  Spiritual, Cultural Beliefs, Cheondoism Practices, Values that Affect Care: no  Food Allergies: NKFA  Factors Affecting Nutritional Intake: malabsorption, altered gastrointestinal function    Anthropometrics  Temp: 99.2 °F (37.3 °C)  Height Method: Stated  Height: 5' 7.5" (171.5 cm)  Height (inches): 67.5 in  Weight Method: Bed Scale  Weight: 66 kg (145 lb 8.1 oz)  Weight (lb): 145.51 lb  Ideal Body Weight (IBW), Female: 137.5 lb  % Ideal Body Weight, Female (lb): 101.81 %  BMI (Calculated): 22.4  BMI Grade: 18.5-24.9 - normal       Weight History:  Wt Readings from Last 10 Encounters:   03/19/20 66 kg (145 lb 8.1 oz)   03/06/20 53 kg (116 lb 13.5 oz)   03/07/20 52.6 kg (116 lb)   03/03/20 53 kg (116 lb 13.5 oz)   02/19/20 58.7 kg (129 lb 4.8 oz)   02/19/20 58.8 kg (129 lb 9.6 oz) "   02/14/20 56.7 kg (125 lb)   02/05/20 56.9 kg (125 lb 6.4 oz)   01/22/20 57.2 kg (126 lb)   01/08/20 61.4 kg (135 lb 6.4 oz)       Lab/Procedures/Meds: Pertinent Labs Reviewed  Clinical Chemistry:  Recent Labs   Lab 03/17/20  1251 03/18/20  0453 03/19/20  0425 03/19/20  1735    137 135*  135*  --    K 3.8 3.5 3.2*  3.2* 3.9    101 102  102  --    CO2 26 29 26  26  --    * 141* 153*  153*  --    BUN 34* 28* 26*  26*  --    CREATININE 1.8* 1.8* 1.8*  1.8*  --    CALCIUM 8.2* 8.1* 7.9*  7.9*  --    PROT 5.0* 5.0* 5.4*  --    ALBUMIN 2.1* 2.0* 2.0*  --    BILITOT 1.2* 1.4* 1.4*  --    ALKPHOS 167* 160* 177*  --    AST 19 17 17  --    ALT 11 9* 11  --    ANIONGAP 10 7* 7*  7*  --    ESTGFRAFRICA 32.6* 32.6* 32.6*  32.6*  --    EGFRNONAA 28.3* 28.3* 28.3*  28.3*  --    MG 2.2 2.3 2.3  --    PHOS 3.7 4.1 3.7  --      CBC:   Recent Labs   Lab 03/19/20  0425   WBC 11.56   RBC 2.49*   HGB 7.6*   HCT 24.2*      MCV 97   MCH 30.5   MCHC 31.4*     Lipid Panel:  Recent Labs   Lab 03/16/20  0525   TRIG 55     Cardiac Profile:  Recent Labs   Lab 03/19/20  0425   BNP 1,253*     Inflammatory Labs:  Recent Labs   Lab 03/19/20  1735   CRP 25.73*     Thyroid & Parathyroid:  Recent Labs   Lab 03/14/20  0504   TSH 3.770   FREET4 0.84     Medications: Pertinent Medications reviewed  Scheduled Meds:   albuterol  2 puff Inhalation Q8H    cefTRIAXone (ROCEPHIN) IVPB  1 g Intravenous Q24H    diphenhydrAMINE  6.25 mg Intravenous Q6H    diphenoxylate-atropine 2.5-0.025 mg  1 tablet Oral QID    dronabinoL  2.5 mg Oral BID AC    enoxparin  30 mg Subcutaneous Q24H    epoetin nica-ebpx (RETACRIT) injection  100 Units/kg Subcutaneous Q7 Days    levothyroxine  88 mcg Oral Before breakfast    loperamide  4 mg Oral QID    magnesium oxide  400 mg Oral BID    pantoprazole  40 mg Intravenous BID    potassium chloride  40 mEq Oral BID    psyllium husk (with sugar)  2 packet Oral TID    sertraline  50 mg  Oral Daily    sodium chloride  1 spray Each Nostril Daily     Continuous Infusions:   dextrose 10 % in water (D10W)      dilTIAZem 5 mg/hr (03/19/20 0045)    furosemide (LASIX) 2 mg/mL continuous infusion (non-titrating) 5 mg/hr (03/19/20 1737)    TPN ADULT CENTRAL LINE CUSTOM (3 in 1) 56 mL/hr at 03/19/20 1759     PRN Meds:.sodium chloride, acetaminophen, butalbital-acetaminophen-caffeine -40 mg, calcium chloride IVPB, calcium chloride IVPB, calcium chloride IVPB, calcium chloride IVPB, calcium chloride IVPB, calcium chloride IVPB, cyclobenzaprine, dextrose 10 % in water (D10W), dextrose 50%, dextrose 50%, glucagon (human recombinant), glucose, glucose, HYDROcodone-acetaminophen, hyoscyamine, magnesium oxide, magnesium oxide, magnesium sulfate IVPB, magnesium sulfate IVPB, magnesium sulfate IVPB, magnesium sulfate IVPB, magnesium sulfate IVPB, magnesium sulfate IVPB, magnesium sulfate IVPB, magnesium sulfate IVPB, melatonin, ondansetron, potassium chloride in water, potassium chloride in water, potassium chloride in water, potassium chloride in water, potassium chloride in water, potassium chloride in water, potassium chloride in water, potassium chloride in water, potassium chloride, potassium chloride, potassium chloride, potassium chloride, potassium chloride, potassium chloride, potassium chloride, potassium chloride, sodium chloride 0.9%, sodium phosphate IVPB, sodium phosphate IVPB, sodium phosphate IVPB, sodium phosphate IVPB, sodium phosphate IVPB, sodium phosphate IVPB, sodium phosphate IVPB, sodium phosphate IVPB, sodium phosphate IVPB, sodium phosphate IVPB    Estimated/Assessed Needs  Weight Used For Calorie Calculations: 66 kg (145 lb 8.1 oz)  Energy Calorie Requirements (kcal): 1980 - 2310 (30 - 35)   Energy Need Method: Kcal/kg  Protein Requirements: 99 - 132 (1.5 - 2)   Weight Used For Protein Calculations: 66 kg (145 lb 8.1 oz)     Estimated Fluid Requirement Method: RDA Method  RDA  Method (mL): 1980 or to maintain hydration        Nutrition Prescription Ordered  Current Diet Order: Regular Diet   Current Nutrition Support Formula Ordered: Other (Comment)(custom TPN )  Current Nutrition Support Rate Ordered: 55 (ml)  Current Nutrition Support Frequency Ordered: ml/hr     Evaluation of Received Nutrient/Fluid Intake  Parenteral Calories (kcal): 1927  Parenteral Protein (gm): 76  Parenteral Fluid (mL): 1320  Energy Calories Required: meeting needs  Protein Required: meeting needs  Fluid Required: meeting needs  Tolerance: tolerating     Intake/Output Summary (Last 24 hours) at 3/19/2020 1930  Last data filed at 3/19/2020 1800  Gross per 24 hour   Intake 2144.08 ml   Output 5150 ml   Net -3005.92 ml      Nutrition Risk  Level of Risk/Frequency of Follow-up: high     Monitor and Evaluation  Food and Nutrient Intake: food and beverage intake, energy intake, parenteral nutrition intake  Food and Nutrient Adminstration: diet order, enteral and parenteral nutrition administration  Physical Activity and Function: nutrition-related ADLs and IADLs, factors affecting access to physical activity  Anthropometric Measurements: weight change, weight, body mass index  Biochemical Data, Medical Tests and Procedures: electrolyte and renal panel, glucose/endocrine profile, lipid profile, gastrointestinal profile, inflammatory profile  Nutrition-Focused Physical Findings: overall appearance, extremities, muscles and bones, head and eyes, skin     Nutrition Follow-Up  RD Follow-up?: Yes    Sandra Lovell RD 03/19/2020 7:31 PM

## 2020-03-20 NOTE — PLAN OF CARE
03/19/20 2008   Patient Assessment/Suction   Level of Consciousness (AVPU) alert   Respiratory Effort Normal;Mild;Short of breath   All Lung Fields Breath Sounds diminished;crackles   PRE-TX-O2   O2 Device (Oxygen Therapy) nasal cannula w/ humidification   Flow (L/min) 5   SpO2 (!) 92 %   Pulse Oximetry Type Continuous   Pulse 97   Resp (!) 30   Inhaler   $ Inhaler Charges MDI (Metered Dose Inahler) Treatment   Daily Review of Necessity (Inhaler) completed   Respiratory Treatment Status (Inhaler) given   Treatment Route (Inhaler) spacer/holding chamber   Patient Position (Inhaler) HOB elevated   Post Treatment Assessment (Inhaler) breath sounds improved   Signs of Intolerance (Inhaler) none   Preset CPAP/BiPAP Settings   Mode Of Delivery BiPAP   $ CPAP/BiPAP Daily Charge BiPAP/CPAP Daily   $ Is patient using? Yes   Size of Mask Small   Sized Appropriately? Yes   Equipment Type V60   Ipap 12   EPAP (cm H2O) 6   Pressure Support (cm H2O) 6   Set Rate (Breaths/Min) 12   ITime (sec) 9   Rise Time (sec) 3   Respiratory Evaluation   $ Care Plan Tech Time 15 min

## 2020-03-21 LAB
ALBUMIN SERPL BCP-MCNC: 2 G/DL (ref 3.5–5.2)
ALP SERPL-CCNC: 206 U/L (ref 55–135)
ALT SERPL W/O P-5'-P-CCNC: 13 U/L (ref 10–44)
ANION GAP SERPL CALC-SCNC: 9 MMOL/L (ref 8–16)
AST SERPL-CCNC: 24 U/L (ref 10–40)
BILIRUB SERPL-MCNC: 1.2 MG/DL (ref 0.1–1)
BUN SERPL-MCNC: 35 MG/DL (ref 8–23)
CALCIUM SERPL-MCNC: 8.3 MG/DL (ref 8.7–10.5)
CHLORIDE SERPL-SCNC: 100 MMOL/L (ref 95–110)
CO2 SERPL-SCNC: 28 MMOL/L (ref 23–29)
CREAT SERPL-MCNC: 1.8 MG/DL (ref 0.5–1.4)
ERYTHROCYTE [DISTWIDTH] IN BLOOD BY AUTOMATED COUNT: 16.6 % (ref 11.5–14.5)
EST. GFR  (AFRICAN AMERICAN): 32.6 ML/MIN/1.73 M^2
EST. GFR  (NON AFRICAN AMERICAN): 28.3 ML/MIN/1.73 M^2
GLUCOSE SERPL-MCNC: 129 MG/DL (ref 70–110)
HCT VFR BLD AUTO: 24.3 % (ref 37–48.5)
HGB BLD-MCNC: 7.7 G/DL (ref 12–16)
MAGNESIUM SERPL-MCNC: 2.2 MG/DL (ref 1.6–2.6)
MCH RBC QN AUTO: 30.4 PG (ref 27–31)
MCHC RBC AUTO-ENTMCNC: 31.7 G/DL (ref 32–36)
MCV RBC AUTO: 96 FL (ref 82–98)
PHOSPHATE SERPL-MCNC: 3.3 MG/DL (ref 2.7–4.5)
PLATELET # BLD AUTO: 391 K/UL (ref 150–350)
PMV BLD AUTO: 11.4 FL (ref 9.2–12.9)
POTASSIUM SERPL-SCNC: 4 MMOL/L (ref 3.5–5.1)
PROT SERPL-MCNC: 5.4 G/DL (ref 6–8.4)
RBC # BLD AUTO: 2.53 M/UL (ref 4–5.4)
SODIUM SERPL-SCNC: 137 MMOL/L (ref 136–145)
WBC # BLD AUTO: 11.26 K/UL (ref 3.9–12.7)

## 2020-03-21 PROCEDURE — 27000221 HC OXYGEN, UP TO 24 HOURS

## 2020-03-21 PROCEDURE — 97116 GAIT TRAINING THERAPY: CPT

## 2020-03-21 PROCEDURE — 63600175 PHARM REV CODE 636 W HCPCS: Performed by: INTERNAL MEDICINE

## 2020-03-21 PROCEDURE — 97530 THERAPEUTIC ACTIVITIES: CPT

## 2020-03-21 PROCEDURE — B4185 PARENTERAL SOL 10 GM LIPIDS: HCPCS | Performed by: INTERNAL MEDICINE

## 2020-03-21 PROCEDURE — 83735 ASSAY OF MAGNESIUM: CPT

## 2020-03-21 PROCEDURE — 94660 CPAP INITIATION&MGMT: CPT

## 2020-03-21 PROCEDURE — 25000003 PHARM REV CODE 250: Performed by: INTERNAL MEDICINE

## 2020-03-21 PROCEDURE — 85027 COMPLETE CBC AUTOMATED: CPT

## 2020-03-21 PROCEDURE — 25000003 PHARM REV CODE 250: Performed by: STUDENT IN AN ORGANIZED HEALTH CARE EDUCATION/TRAINING PROGRAM

## 2020-03-21 PROCEDURE — A4217 STERILE WATER/SALINE, 500 ML: HCPCS | Performed by: INTERNAL MEDICINE

## 2020-03-21 PROCEDURE — 99232 SBSQ HOSP IP/OBS MODERATE 35: CPT | Mod: ,,, | Performed by: INTERNAL MEDICINE

## 2020-03-21 PROCEDURE — 94761 N-INVAS EAR/PLS OXIMETRY MLT: CPT

## 2020-03-21 PROCEDURE — 63600175 PHARM REV CODE 636 W HCPCS: Performed by: NURSE PRACTITIONER

## 2020-03-21 PROCEDURE — 84100 ASSAY OF PHOSPHORUS: CPT

## 2020-03-21 PROCEDURE — 99900035 HC TECH TIME PER 15 MIN (STAT)

## 2020-03-21 PROCEDURE — 82962 GLUCOSE BLOOD TEST: CPT

## 2020-03-21 PROCEDURE — 99232 PR SUBSEQUENT HOSPITAL CARE,LEVL II: ICD-10-PCS | Mod: ,,, | Performed by: INTERNAL MEDICINE

## 2020-03-21 PROCEDURE — 94640 AIRWAY INHALATION TREATMENT: CPT

## 2020-03-21 PROCEDURE — 80053 COMPREHEN METABOLIC PANEL: CPT

## 2020-03-21 PROCEDURE — 21000000 HC CCU ICU ROOM CHARGE

## 2020-03-21 PROCEDURE — C9113 INJ PANTOPRAZOLE SODIUM, VIA: HCPCS | Performed by: INTERNAL MEDICINE

## 2020-03-21 PROCEDURE — 25000003 PHARM REV CODE 250: Performed by: FAMILY MEDICINE

## 2020-03-21 RX ORDER — TORSEMIDE 20 MG/1
20 TABLET ORAL 2 TIMES DAILY
Status: DISCONTINUED | OUTPATIENT
Start: 2020-03-22 | End: 2020-03-24

## 2020-03-21 RX ADMIN — PSYLLIUM HUSK 2 PACKET: 3.4 POWDER ORAL at 08:03

## 2020-03-21 RX ADMIN — ALBUTEROL SULFATE 2 PUFF: 90 AEROSOL, METERED RESPIRATORY (INHALATION) at 07:03

## 2020-03-21 RX ADMIN — AZITHROMYCIN MONOHYDRATE 500 MG: 500 INJECTION, POWDER, LYOPHILIZED, FOR SOLUTION INTRAVENOUS at 11:03

## 2020-03-21 RX ADMIN — LOPERAMIDE HYDROCHLORIDE 4 MG: 2 CAPSULE ORAL at 09:03

## 2020-03-21 RX ADMIN — MAGNESIUM OXIDE 400 MG: 400 TABLET ORAL at 09:03

## 2020-03-21 RX ADMIN — HYDROCODONE BITARTRATE AND ACETAMINOPHEN 1 TABLET: 5; 325 TABLET ORAL at 12:03

## 2020-03-21 RX ADMIN — HYOSCYAMINE SULFATE 0.12 MG: 0.12 TABLET ORAL at 08:03

## 2020-03-21 RX ADMIN — DIPHENHYDRAMINE HYDROCHLORIDE 6.25 MG: 50 INJECTION INTRAMUSCULAR; INTRAVENOUS at 06:03

## 2020-03-21 RX ADMIN — DIPHENOXYLATE HYDROCHLORIDE AND ATROPINE SULFATE 1 TABLET: 2.5; .025 TABLET ORAL at 04:03

## 2020-03-21 RX ADMIN — DIPHENHYDRAMINE HYDROCHLORIDE 6.25 MG: 50 INJECTION INTRAMUSCULAR; INTRAVENOUS at 05:03

## 2020-03-21 RX ADMIN — DIPHENHYDRAMINE HYDROCHLORIDE 6.25 MG: 50 INJECTION INTRAMUSCULAR; INTRAVENOUS at 12:03

## 2020-03-21 RX ADMIN — DRONABINOL 2.5 MG: 2.5 CAPSULE ORAL at 04:03

## 2020-03-21 RX ADMIN — FUROSEMIDE 5 MG/HR: 10 INJECTION, SOLUTION INTRAMUSCULAR; INTRAVENOUS at 12:03

## 2020-03-21 RX ADMIN — CYCLOBENZAPRINE HYDROCHLORIDE 5 MG: 5 TABLET, FILM COATED ORAL at 08:03

## 2020-03-21 RX ADMIN — DIPHENOXYLATE HYDROCHLORIDE AND ATROPINE SULFATE 1 TABLET: 2.5; .025 TABLET ORAL at 08:03

## 2020-03-21 RX ADMIN — LEVOTHYROXINE SODIUM 88 MCG: 88 TABLET ORAL at 06:03

## 2020-03-21 RX ADMIN — PANTOPRAZOLE SODIUM 40 MG: 40 INJECTION, POWDER, LYOPHILIZED, FOR SOLUTION INTRAVENOUS at 08:03

## 2020-03-21 RX ADMIN — SALINE NASAL SPRAY 1 SPRAY: 1.5 SOLUTION NASAL at 08:03

## 2020-03-21 RX ADMIN — LOPERAMIDE HYDROCHLORIDE 4 MG: 2 CAPSULE ORAL at 12:03

## 2020-03-21 RX ADMIN — LOPERAMIDE HYDROCHLORIDE 4 MG: 2 CAPSULE ORAL at 08:03

## 2020-03-21 RX ADMIN — MAGNESIUM OXIDE 400 MG: 400 TABLET ORAL at 08:03

## 2020-03-21 RX ADMIN — SMOFLIPID: 6; 6; 5; 3 INJECTION, EMULSION INTRAVENOUS at 04:03

## 2020-03-21 RX ADMIN — LOPERAMIDE HYDROCHLORIDE 4 MG: 2 CAPSULE ORAL at 04:03

## 2020-03-21 RX ADMIN — PSYLLIUM HUSK 2 PACKET: 3.4 POWDER ORAL at 04:03

## 2020-03-21 RX ADMIN — DIPHENOXYLATE HYDROCHLORIDE AND ATROPINE SULFATE 1 TABLET: 2.5; .025 TABLET ORAL at 09:03

## 2020-03-21 RX ADMIN — DIPHENOXYLATE HYDROCHLORIDE AND ATROPINE SULFATE 1 TABLET: 2.5; .025 TABLET ORAL at 12:03

## 2020-03-21 RX ADMIN — PANTOPRAZOLE SODIUM 40 MG: 40 INJECTION, POWDER, LYOPHILIZED, FOR SOLUTION INTRAVENOUS at 09:03

## 2020-03-21 RX ADMIN — DRONABINOL 2.5 MG: 2.5 CAPSULE ORAL at 06:03

## 2020-03-21 RX ADMIN — SERTRALINE HYDROCHLORIDE 50 MG: 50 TABLET ORAL at 08:03

## 2020-03-21 RX ADMIN — BUTALBITAL, ACETAMINOPHEN AND CAFFEINE 1 TABLET: 50; 325; 40 TABLET ORAL at 01:03

## 2020-03-21 RX ADMIN — ENOXAPARIN SODIUM 30 MG: 100 INJECTION SUBCUTANEOUS at 04:03

## 2020-03-21 RX ADMIN — HYDROCODONE BITARTRATE AND ACETAMINOPHEN 1 TABLET: 5; 325 TABLET ORAL at 09:03

## 2020-03-21 RX ADMIN — ALBUTEROL SULFATE 2 PUFF: 90 AEROSOL, METERED RESPIRATORY (INHALATION) at 02:03

## 2020-03-21 RX ADMIN — BUTALBITAL, ACETAMINOPHEN AND CAFFEINE 1 TABLET: 50; 325; 40 TABLET ORAL at 12:03

## 2020-03-21 NOTE — ASSESSMENT & PLAN NOTE
· Due to pulmonary edema and volume overload  · Continue diuresis - better  · Minimize fluids  · O2 to keep sats > 90%  · Prn albuterol  · Check weight daily

## 2020-03-21 NOTE — PROGRESS NOTES
[facility] Progress Note    Patient Name: Shara Hutchins MRN: [id number]   Patient Class: IP- Inpatient  Length of Stay: 12   Admission Date: 3/7/2020  8:42 PM Attending Physician: Digna Griffin MD   Primary Care Provider: April Horton MD Face-to-Face encounter date: 03/21/2020   Chief Complaint: No chief complaint on file.        Subjective:    Hospital course:  Patient was admitted for severe dehydration and malnutrition with 20 lb weight loss at home due to high output from the ileostomy.  Patient was started on TPN and IV fluids.  Patient continued to have high output to the ileostomy with multiple anti-diarrheal agents.  Patient was started on octreotide with improved diarrhea.  Patient's symptoms improved during hospitalization.  Patient was tolerating diet and had improved nutrition.  Octreotide was discontinued however patient started having watery diarrhea and increased output.  Discussed with surgery who has concerns for short gut syndrome.  Plan for reversal of ileostomy in several weeks.  Adjusted patient's antidiarrhea medication with improved output from the ostomy.  Discontinue octreotide.  Clinical course complicated by recurrent DARCI and third-spacing causing pitting edema.  3/16:  Has increased shortness of breath.  Requiring 2 L nasal cannula saturating 94%.  Continues to have pitting edema and upper and lower extremities.  Suspect 3rd spacing.  DVT negative on ultrasound.  Stool is more formed today.  Continues to be off octreotide.  Transfuse 1 PRBC yesterday.  Started on EPO per Nephrology.  IV Lasix ordered.  Has good urine output.  Afebrile the past 24 hr.  Urine culture grew gram-negative estelle greater than 100,000.  Rocephin started.  Awaiting for LTAC placement.    3/17:Continued to be hypoxic saturating 88-90% on 4 l oxy   Overnight patient had acute urinary retention, Godwin changed.  Continued to have hematuria,with occasional clots, slowly Hb trending  "down    3/18:primarily worried about headache and neck pain  Also has loss of appetite,flexril added  3/19:pt desaturated to 78% on nc while trying to lye flat, HOB elevation with bipap improved saturation  Patient also noted to have AFib overnight placed on Cardizem drip, currently in sinus rhythm  Cardiology consult requested for possibly orthopnea causing hypoxia  Echo, unremarkable but BNP elevated  Started on Lasix drip  3/20:continued to request 6 L for oxygen   Procalcitonin elevated, hemoglobin 8.1, will hold on blood transfusion  BNP trending up  Cardiology and pulmonology follow-up appreciated  Will continue Lasix drip for 1 more day  Will extend antibiotic coverage with acithromycin for atypical pneumonia      Interval History: .    Appears to be in very positive spirits, but continued to be in 5l oxy  Able to sleep wall last night  Improved subjective dyspnea, not on bipap  Continue lasix gtt and swith to 40bid tomorrow  Monitor Hb  PT eval appreciated, recommend SNF  Formed stool on ileostomy bag  F/u sx on ileostomy care and TPN on d/c planning     Review of Systems All other Review of Systems were found to be negative expect for that mentioned already in HPI.     Objective:   Physical Exam  BP (!) 147/74   Pulse 99   Temp 98.1 °F (36.7 °C) (Oral)   Resp 20   Ht 5' 7.5" (1.715 m)   Wt 59.3 kg (130 lb 11.7 oz)   SpO2 95%   Breastfeeding? No   BMI 20.17 kg/m²   Vitals reviewed.    Constitutional: No acute distress.   HENT: Atraumatic. PERRLA  Cardiovascular: Normal rate, regular rhythm and normal heart sounds.   Pulmonary/Chest: Effort normal. She has no wheezes blt rales. S/p port   Abdominal: Soft. no tenderness,no distension,bowel sounds are normal  S/p ostomy ,lee with hematuria  Neurological: AOX3  Skin: Skin is warm and dry.     Following labs were Reviewed   Recent Labs   Lab 03/21/20  0410   WBC 11.26   HGB 7.7*   HCT 24.3*   *   CALCIUM 8.3*   ALBUMIN 2.0*   PROT 5.4*    "   K 4.0   CO2 28      BUN 35*   CREATININE 1.8*   ALKPHOS 206*   ALT 13   AST 24   BILITOT 1.2*     No results found for: POCTGLUCOSE     Microbiology Results (last 7 days)     Procedure Component Value Units Date/Time    Urine Culture High Risk [[id number]]  (Abnormal)  (Susceptibility) Collected:  03/15/20 9360    Order Status:  Completed Specimen:  Urine, Catheterized Updated:  03/17/20 0632     Urine Culture, Routine KLEBSIELLA PNEUMONIAE  >100,000 cfu/ml      Narrative:       Indicated criteria for high risk culture:->Other  Other (specify):->sick        X-Ray Chest AP Portable   Final Result      CT Chest Without Contrast   Final Result      1. Diffuse bilateral abnormal interstitial prominence with multifocal bilateral ground-glass opacities, favored to reflect pulmonary edema.  Pneumonia is felt less likely, but not absolutely excluded.   2. Small left and small to moderate right pleural effusions.   3. Small pericardial effusion.   4. Dense coronary artery atherosclerotic calcification.   5. Ectasia of the thoracic aorta as discussed, stable.   6. Additional findings as above.         Electronically signed by: Dixon Tohmas MD   Date:    03/19/2020   Time:    16:07      X-Ray Chest AP Portable   Final Result      No significant interval change in diffuse interstitial opacities, potentially reflecting acute congestive failure and or hypervolemia, viral or atypical pneumonia, and or developing ARDS.         Electronically signed by: Mike Martinez MD   Date:    03/18/2020   Time:    15:49      X-Ray Chest AP Portable   Final Result      Increasing diffuse bilateral lung opacity consistent with pulmonary edema, ARDS, or diffuse pneumonia         Electronically signed by: Maykel Schofield MD   Date:    03/16/2020   Time:    14:01      US Upper Extremity Veins Left   Final Result      Negative for left upper extremity deep venous thrombosis.         Electronically signed by: Mike Martinez MD    Date:    03/15/2020   Time:    10:59      X-Ray Chest AP Portable   Final Result      1. Slightly increasing airspace disease in the left upper lobe when compared to March 10, with slightly increasing small left pleural effusion.  Additional stable findings as described.  Findings could be on the basis of pulmonary edema or pneumonia.         Electronically signed by: Dixon Thomas MD   Date:    03/13/2020   Time:    15:08      X-Ray Chest AP Portable   Final Result      1.  Findings of mild pulmonary vascular congestion/edema with trace bilateral pleural effusions.      2.  Right-sided IJ central venous catheter, which takes a hairpin turn at its distal tip at the level of the SVC.         Electronically signed by: Rashi Wilder MD   Date:    03/10/2020   Time:    17:43          Inpatient medications  Scheduled Meds:   albuterol  2 puff Inhalation Q6H WAKE    azithromycin  500 mg Intravenous Q24H    diphenhydrAMINE  6.25 mg Intravenous Q6H    diphenoxylate-atropine 2.5-0.025 mg  1 tablet Oral QID    dronabinoL  2.5 mg Oral BID AC    enoxparin  30 mg Subcutaneous Q24H    epoetin nica-ebpx (RETACRIT) injection  100 Units/kg Subcutaneous Q7 Days    levothyroxine  88 mcg Oral Before breakfast    loperamide  4 mg Oral QID    magnesium oxide  400 mg Oral BID    pantoprazole  40 mg Intravenous BID    psyllium husk (with sugar)  2 packet Oral TID    sertraline  50 mg Oral Daily    sodium chloride  1 spray Each Nostril Daily     Continuous Infusions:   dextrose 10 % in water (D10W)      furosemide (LASIX) 2 mg/mL continuous infusion (non-titrating) 5 mg/hr (03/21/20 1248)    TPN ADULT CENTRAL LINE CUSTOM (3 in 1) Stopped (03/21/20 1238)    TPN ADULT CENTRAL LINE CUSTOM (3 in 1)       PRN Meds:.sodium chloride, acetaminophen, butalbital-acetaminophen-caffeine -40 mg, calcium chloride IVPB, calcium chloride IVPB, calcium chloride IVPB, calcium chloride IVPB, calcium chloride IVPB, calcium  chloride IVPB, cyclobenzaprine, dextrose 10 % in water (D10W), dextrose 50%, dextrose 50%, glucagon (human recombinant), glucose, glucose, HYDROcodone-acetaminophen, hyoscyamine, magnesium oxide, magnesium oxide, magnesium sulfate IVPB, magnesium sulfate IVPB, magnesium sulfate IVPB, magnesium sulfate IVPB, magnesium sulfate IVPB, magnesium sulfate IVPB, magnesium sulfate IVPB, magnesium sulfate IVPB, melatonin, ondansetron, potassium chloride in water, potassium chloride in water, potassium chloride in water, potassium chloride in water, potassium chloride in water, potassium chloride in water, potassium chloride in water, potassium chloride in water, potassium chloride, potassium chloride, potassium chloride, potassium chloride, potassium chloride, potassium chloride, potassium chloride, potassium chloride, sodium chloride 0.9%, sodium phosphate IVPB, sodium phosphate IVPB, sodium phosphate IVPB, sodium phosphate IVPB, sodium phosphate IVPB, sodium phosphate IVPB, sodium phosphate IVPB, sodium phosphate IVPB, sodium phosphate IVPB, sodium phosphate IVPB      Assessment & Plan:   Shara Hutchins is a 69 y.o. female admitted for    Malnutrition  Continue TPN until ostomy output decreased  Trial of discontinue octreotide drip  On TPN for nutrition support with suspected short gut syndrome  Will need reversal of ostomy with General surgery in several weeks  Awaiting for LTAC placement  PT/OT  Resolved  thrombocytopenia  S/p 1 PRBC. On epo    Urinary retension with UTI  UCx -Klebsilla on rocephin  On lee  Urology eval appreciated    DARCI/CKD III  Renal function improved  Monitor urine output  Nephrology on board    Unstageable pressure injury of skin and tissue  Wound care ordered    Anemia of chronic disease  Transfuse 1 unit of prbc's  ADAM weekly 100 units/kg SQ.                     Core measures:  -DNR  - DVT PPx: bilateral SCDs   - lines/ tubes: iliostomy, Lees cath       Discharge Planning:   Awaiting for LTAC  placement  .       Above encounter included review of the medical records, interviewing and examining the patient face-to-face, discussion with family and other health care providers, ordering and interpreting lab/test results and formulating a plan of care.     Medical Decision Making:      [] Low Complexity  [x] Moderate Complexity  [] High Complexity

## 2020-03-21 NOTE — SUBJECTIVE & OBJECTIVE
Interval History:     3/19/2020 - Stable overnight, no new issues reported.  Weight is up 2 kg from admit but that weight is up about 10 kg from last hospital stay.  No new respiratory complaints, remains dyspneic and on NC O2.chest X-ray yesterday was unchanged.  I/O negative 2.2 liters.  Had atrial fibrillation last night and is back in NSR on cardizem.    3/20/2020 - Feels beter today, on lasix drip.  Weight decreased 2.6 kg, I/O negative 6.3 liters yesterday.  No new complaints.    3/21/2020 - Continnues to feel better, no neew complaints.  TPN has been stopped and she has started eating.  Is about 12 liters negative over the last 2 days    Review of Systems   Constitutional: Positive for malaise/fatigue and weight loss. Negative for chills, diaphoresis and fever.   HENT: Negative for congestion.    Eyes: Negative for pain.   Respiratory: Positive for cough and shortness of breath. Negative for hemoptysis, sputum production, wheezing and stridor.    Cardiovascular: Positive for orthopnea and leg swelling. Negative for chest pain, palpitations, claudication and PND.   Gastrointestinal: Positive for diarrhea. Negative for abdominal pain, constipation, heartburn, nausea and vomiting.        Ostomy   Genitourinary: Negative for dysuria, frequency, hematuria and urgency.   Musculoskeletal: Negative for falls and myalgias.   Neurological: Positive for weakness (generalized). Negative for sensory change and focal weakness.   Psychiatric/Behavioral: Negative for depression and suicidal ideas. The patient is not nervous/anxious.          Objective:     Vital Signs (Most Recent):  Temp: 98.6 °F (37 °C) (03/21/20 1525)  Pulse: 98 (03/21/20 1547)  Resp: 20 (03/21/20 1547)  BP: 105/62 (03/21/20 1525)  SpO2: (!) 94 % (03/21/20 1547) Vital Signs (24h Range):  Temp:  [98.1 °F (36.7 °C)-99.1 °F (37.3 °C)] 98.6 °F (37 °C)  Pulse:  [] 98  Resp:  [18-30] 20  SpO2:  [90 %-97 %] 94 %  BP: (105-151)/(62-74) 105/62     Weight:  59.3 kg (130 lb 11.7 oz)  Body mass index is 20.17 kg/m².      Intake/Output Summary (Last 24 hours) at 3/21/2020 1654  Last data filed at 3/21/2020 1643  Gross per 24 hour   Intake 1916.54 ml   Output 6975 ml   Net -5058.46 ml       Physical Exam   Constitutional: She is oriented to person, place, and time. She appears well-developed and well-nourished. No distress.   Chronically ill female, nad  Sitting in bed   HENT:   Head: Normocephalic and atraumatic.   Right Ear: External ear normal.   Left Ear: External ear normal.   Nose: Nose normal.   Mouth/Throat: Oropharynx is clear and moist.   Eyes: Pupils are equal, round, and reactive to light. Conjunctivae and EOM are normal.   Neck: Normal range of motion. Neck supple. No JVD present. No tracheal deviation present. No thyromegaly present.   Cardiovascular: Normal rate, regular rhythm, normal heart sounds and intact distal pulses. Exam reveals no gallop and no friction rub.   No murmur heard.  Pulmonary/Chest: Effort normal. No stridor. No respiratory distress. She has no wheezes. She has rales. She exhibits no tenderness.   Decreased at bases   Abdominal: Soft. Bowel sounds are normal. She exhibits no distension. There is no tenderness. There is no rebound and no guarding.   + ostomy   Musculoskeletal: Normal range of motion. She exhibits edema (in dependent areas). She exhibits no tenderness.   Lymphadenopathy:     She has no cervical adenopathy.   Neurological: She is alert and oriented to person, place, and time. No cranial nerve deficit.   Skin: Skin is warm and dry. She is not diaphoretic.   Psychiatric: She has a normal mood and affect. Her behavior is normal.   Nursing note and vitals reviewed.      Vents:  Oxygen Concentration (%): 45 (03/20/20 0702)    Lines/Drains/Airways     Peripherally Inserted Central Catheter Line            PICC Double Lumen 03/06/20 1154 other (see comments) 15 days          Drain                 Hemodialysis AV Fistula Left upper  arm -- days         Ileostomy 02/13/20 1600 Loop RLQ 36 days         Urethral Catheter 03/16/20 2230 Straight-tip 18 Fr. 4 days                Significant Labs:    CBC/Anemia Profile:  Recent Labs   Lab 03/20/20  0557 03/21/20  0410   WBC 11.93 11.26   HGB 8.1* 7.7*   HCT 27.7* 24.3*    391*   * 96   RDW 17.5* 16.6*        Chemistries:  Recent Labs   Lab 03/19/20  1735 03/20/20  1027 03/21/20  0410   NA  --  135* 137   K 3.9 4.4 4.0   CL  --  99 100   CO2  --  26 28   BUN  --  29* 35*   CREATININE  --  1.7* 1.8*   CALCIUM  --  8.2* 8.3*   ALBUMIN  --  2.0* 2.0*   PROT  --  5.4* 5.4*   BILITOT  --  1.3* 1.2*   ALKPHOS  --  216* 206*   ALT  --  12 13   AST  --  23 24   MG  --  2.0 2.2   PHOS  --  2.7 3.3       All pertinent labs within the past 24 hours have been reviewed.    BNP - 1502    Microbiology Results (last 7 days)     Procedure Component Value Units Date/Time    Urine Culture High Risk [[id number]]  (Abnormal)  (Susceptibility) Collected:  03/15/20 2350    Order Status:  Completed Specimen:  Urine, Catheterized Updated:  03/17/20 0632     Urine Culture, Routine KLEBSIELLA PNEUMONIAE  >100,000 cfu/ml      Narrative:       Indicated criteria for high risk culture:->Other  Other (specify):->sick            Significant Imaging:  I have reviewed and interpreted all pertinent imaging results/findings within the past 24 hours.     CXR (3/18) - about the same, bilateral infiltrates c/w fluid  CXR (3/20) - about the same    ECHO (3/19)  · Mild concentric left ventricular hypertrophy.  · Pulmonary hypertension present.  · Normal left ventricular systolic function. The estimated ejection fraction is 60%.  · Grade I (mild) left ventricular diastolic dysfunction consistent with impaired relaxation.  · No wall motion abnormalities.  · Normal right ventricular systolic function.  · Intermediate central venous pressure (8 mmHg).  · Trivial pericardial effusion.  · There is a left pleural effusion.  · The  estimated PA systolic pressure is 47 mmHg.

## 2020-03-21 NOTE — CONSULTS
INPATIENT NEPHROLOGY CONSULT   [facility] NEPHROLOGY    Patient Name: Shara Hutchins  Date: 03/21/2020    Reason for consultation: DARCI/CKD    Chief Complaint: Diarrhea    History of Present Illness:  70 y/o F with hx of HTN, NIDDM-2, CKD-3, vulvar cancer (s/p chemo/radiation), and now s/p ileostomy after pneumatosis intestinalis earlier in 2020 and with persistent diarrhea 2/2 chemotherapy who p/w high ostomy output, treated with TPN/octreotide, awaiting LTAC, developed gross hematuria 3/13, noted to have bump in Cr and Hb drop, consulted for DARCI and anemia.    3/17 VSS, no new complains.  3/18 VSS, no new complains. UO 3L, ostomy 900, not sure what blood output 1,800ml is...  3/19 VSS, no new complains. UO 3.4L, ostomy 1L.  3/20 VSS, no new complains. UO 7L, ostomy 1.8L.  3/21 VSS, no new complains. UO 5L, ostomy 2L. On TPN.    Plan of Care:    1. DARCI/CKD III  - sCr seem better, baseline is 1.6  - no nsaids or IV contrast    2. Chronic volume depletion 2/2 diarrhea  - monitor ostomy output  - on TPN  - on multiple stool bulking agents, loperamide; surgery in future    3. Gross hematuria  - persistent, plan per urology. Most likely cause for the persistent anemia.    4. Acute blood loss anemia on chronic anemia of CKD and chronic illness  - transfused 1 unit of blood on 3/15.  - ADAM weekly 100 units/kg SQ.  - will defer further blood transfusions to primary team and Urology.    5. Edema  - Apprecaite cards input.    Thank you for allowing us to participate in this patient's care. We will continue to follow.    Vital Signs:  Temp Readings from Last 3 Encounters:   03/21/20 98.1 °F (36.7 °C) (Oral)   03/07/20 98.2 °F (36.8 °C) (Oral)   03/05/20 97.8 °F (36.6 °C) (Oral)       Pulse Readings from Last 3 Encounters:   03/21/20 110   03/07/20 (!) 57   03/05/20 61       BP Readings from Last 3 Encounters:   03/21/20 (!) 147/74   03/07/20 131/61   03/05/20 (!) 169/74       Weight:  Wt Readings from Last 3 Encounters:    03/21/20 59.3 kg (130 lb 11.7 oz)   03/06/20 53 kg (116 lb 13.5 oz)   03/07/20 52.6 kg (116 lb)       Past Medical & Surgical History:  Past Medical History:   Diagnosis Date    Chronic kidney disease, stage III (moderate) 1/7/2018    Diabetes mellitus, type 2     Fatigue     Hypertension     Iron deficiency anemia due to chronic blood loss 1/7/2018    Vulvar cancer 1/7/2019       Past Surgical History:   Procedure Laterality Date    ABDOMINAL SURGERY      BACK SURGERY      carpel tunnel surgery once on each hand      CERVICAL FUSION      x 4    cervix repair      CHOLECYSTECTOMY  2000    DIAGNOSTIC LAPAROSCOPY N/A 2/13/2020    Procedure: LAPAROSCOPY, DIAGNOSTIC;  Surgeon: Robbi Lovell III, MD;  Location: [facility] OR;  Service: General;  Laterality: N/A;    HYSTERECTOMY  1980    ILEOSTOMY N/A 2/13/2020    Procedure: CREATION, ILEOSTOMY Loop;  Surgeon: Robbi Lovell III, MD;  Location: [facility] OR;  Service: General;  Laterality: N/A;    LUMBAR LAMINECTOMY      LYSIS OF ADHESIONS N/A 2/13/2020    Procedure: LYSIS, ADHESIONS;  Surgeon: Robbi Lovell III, MD;  Location: [facility] OR;  Service: General;  Laterality: N/A;    NECK SURGERY      PHLEBOGRAPHY  2/28/2020    Procedure: VENOGRAM;  Surgeon: Robbi Lovell III, MD;  Location: [facility] OR;  Service: General;;    REMOVAL OF VASCULAR ACCESS PORT Right 2/13/2020    Procedure: REMOVAL, VASCULAR ACCESS PORT;  Surgeon: Robbi Lovell III, MD;  Location: [facility] OR;  Service: General;  Laterality: Right;    SHOULDER SURGERY      vulva cancer         Past Social History:  Social History     Socioeconomic History    Marital status:      Spouse name: Not on file    Number of children: Not on file    Years of education: Not on file    Highest education level: Not on file   Occupational History    Not on file   Social Needs    Financial resource strain: Not on file    Food insecurity:     Worry: Not on file     Inability: Not on file     Transportation needs:     Medical: Not on file     Non-medical: Not on file   Tobacco Use    Smoking status: Former Smoker     Packs/day: 1.00     Years: 33.00     Pack years: 33.00     Types: Cigarettes     Last attempt to quit: 2000     Years since quittin.6    Smokeless tobacco: Never Used   Substance and Sexual Activity    Alcohol use: No    Drug use: No    Sexual activity: Not on file   Lifestyle    Physical activity:     Days per week: Not on file     Minutes per session: Not on file    Stress: Not on file   Relationships    Social connections:     Talks on phone: Not on file     Gets together: Not on file     Attends [employer] service: Not on file     Active member of club or organization: Not on file     Attends meetings of clubs or organizations: Not on file     Relationship status: Not on file   Other Topics Concern    Not on file   Social History Narrative    Not on file       Medications:  Scheduled Meds:   albuterol  2 puff Inhalation Q6H WAKE    azithromycin  500 mg Intravenous Q24H    diphenhydrAMINE  6.25 mg Intravenous Q6H    diphenoxylate-atropine 2.5-0.025 mg  1 tablet Oral QID    dronabinoL  2.5 mg Oral BID AC    enoxparin  30 mg Subcutaneous Q24H    epoetin nica-ebpx (RETACRIT) injection  100 Units/kg Subcutaneous Q7 Days    levothyroxine  88 mcg Oral Before breakfast    loperamide  4 mg Oral QID    magnesium oxide  400 mg Oral BID    pantoprazole  40 mg Intravenous BID    psyllium husk (with sugar)  2 packet Oral TID    sertraline  50 mg Oral Daily    sodium chloride  1 spray Each Nostril Daily     Continuous Infusions:   dextrose 10 % in water (D10W)      dilTIAZem 5 mg/hr (20 0045)    furosemide (LASIX) 2 mg/mL continuous infusion (non-titrating) 5 mg/hr (20 1737)    TPN ADULT CENTRAL LINE CUSTOM (3 in 1) 55 mL/hr at 20 1711     PRN Meds:.sodium chloride, acetaminophen, butalbital-acetaminophen-caffeine -40 mg, calcium chloride  IVPB, calcium chloride IVPB, calcium chloride IVPB, calcium chloride IVPB, calcium chloride IVPB, calcium chloride IVPB, cyclobenzaprine, dextrose 10 % in water (D10W), dextrose 50%, dextrose 50%, glucagon (human recombinant), glucose, glucose, HYDROcodone-acetaminophen, hyoscyamine, magnesium oxide, magnesium oxide, magnesium sulfate IVPB, magnesium sulfate IVPB, magnesium sulfate IVPB, magnesium sulfate IVPB, magnesium sulfate IVPB, magnesium sulfate IVPB, magnesium sulfate IVPB, magnesium sulfate IVPB, melatonin, ondansetron, potassium chloride in water, potassium chloride in water, potassium chloride in water, potassium chloride in water, potassium chloride in water, potassium chloride in water, potassium chloride in water, potassium chloride in water, potassium chloride, potassium chloride, potassium chloride, potassium chloride, potassium chloride, potassium chloride, potassium chloride, potassium chloride, sodium chloride 0.9%, sodium phosphate IVPB, sodium phosphate IVPB, sodium phosphate IVPB, sodium phosphate IVPB, sodium phosphate IVPB, sodium phosphate IVPB, sodium phosphate IVPB, sodium phosphate IVPB, sodium phosphate IVPB, sodium phosphate IVPB  No current facility-administered medications on file prior to encounter.      Current Outpatient Medications on File Prior to Encounter   Medication Sig Dispense Refill    acetaminophen (TYLENOL) 325 MG tablet Take 2 tablets (650 mg total) by mouth every 4 (four) hours as needed.  0    cholestyramine-aspartame (QUESTRAN LIGHT) 4 gram PwPk Take 1 packet (4 g total) by mouth 2 (two) times daily. 180 packet 3    dextrose 50% injection Inject 25 mLs (12.5 g total) into the vein as needed (between 51 - 70 mg/dL if patient cannnot take PO but has IV access.).      dextrose 50% injection Inject 50 mLs (25 g total) into the vein as needed (less than 50 mg/dL if patient cannnot take PO but has IV access.).      diphenhydrAMINE (BENADRYL) 50 mg/mL injection Inject  "0.125 mLs (6.25 mg total) into the vein every 6 (six) hours.      glucose 4 GM chewable tablet Take 4 tablets (16 g total) by mouth as needed.  12    glucose 4 GM chewable tablet Take 6 tablets (24 g total) by mouth as needed.  12    HYDROcodone-acetaminophen (NORCO) 5-325 mg per tablet Take 1 tablet by mouth every 6 (six) hours as needed.  0    levothyroxine (SYNTHROID) 88 MCG tablet Take 1 tablet (88 mcg total) by mouth before breakfast. 30 tablet 11    melatonin (MELATIN) 3 mg tablet Take 3 tablets (9 mg total) by mouth nightly as needed for Insomnia.  0    ondansetron (ZOFRAN-ODT) 8 MG TbDL Take 1 tablet (8 mg total) by mouth every 8 (eight) hours as needed.      oxyCODONE-acetaminophen (PERCOCET) 5-325 mg per tablet Take 1 tablet by mouth every 4 (four) hours as needed.  0    pantoprazole (PROTONIX) 40 mg injection Inject 40 mg into the vein 2 (two) times daily. 2400 mg 11    psyllium husk, with sugar, 3.4 gram packet Take 2 packets by mouth 3 (three) times daily.      Ringer's solution,lactated (LACTATED RINGERS) infusion Inject 125 mL/hr into the vein continuous.         Allergies:  Ciprofloxacin and Pcn [penicillins]    Past Family History:  Reviewed; refer to Hospitalist Admission Note    Review of Systems:  Review of Systems - All 14 systems reviewed and negative, except as noted in HPI    Physical Exam:  BP (!) 147/74   Pulse 110   Temp 98.1 °F (36.7 °C) (Oral)   Resp 18   Ht 5' 7.5" (1.715 m)   Wt 59.3 kg (130 lb 11.7 oz)   SpO2 (!) 93%   Breastfeeding? No   BMI 20.17 kg/m²     INS/OUTS:  I/O last 3 completed shifts:  In: 2508.8 [P.O.:730; I.V.:91; Other:60; IV Piggyback:250]  Out: 04708 [Urine:25426; Stool:2700]  I/O this shift:  In: 200 [P.O.:200]  Out: 150 [Stool:150]    General Appearance:    NAD, AAO x 3, cooperative, appears stated age   Head:    Normocephalic, atraumatic   Eyes:    PER, EOMI, and conjunctiva/sclera clear bilaterally        Mouth:   Moist mucus membranes, no " thrush or oral lesions, normal      dentition   Back:     No CVA tenderness   Lungs:     Clear to auscultation bilaterally, no wheeze, crackles, rales      or rhonchi, symmetric air movement, respirations unlabored   Heart:    Regular rate and rhythm, S1 and S2 normal, no murmur, rub   or gallop   Abdomen:     Soft, non-tender, non-distended, bowel sounds active all four   quadrants, no RT or guarding, no masses, no organomegaly   Extremities:   Warm and well perfused, distal pulses intact, no cyanosis or    peripheral edema   MSK:   No joint or muscle swelling, tenderness or deformity   Skin:   Skin color, texture, turgor normal, no rashes or lesions   Neurologic/Psychiatric:   CNII-XII intact, normal strength and sensation       throughout, no asterixis; normal affect, memory, judgement     and insight     Results:  Lab Results   Component Value Date     03/21/2020    K 4.0 03/21/2020     03/21/2020    CO2 28 03/21/2020    BUN 35 (H) 03/21/2020    CREATININE 1.8 (H) 03/21/2020    CALCIUM 8.3 (L) 03/21/2020    ANIONGAP 9 03/21/2020    ESTGFRAFRICA 32.6 (A) 03/21/2020    EGFRNONAA 28.3 (A) 03/21/2020       Lab Results   Component Value Date    CALCIUM 8.3 (L) 03/21/2020    PHOS 3.3 03/21/2020       Recent Labs   Lab 03/21/20  0410   WBC 11.26   RBC 2.53*   HGB 7.7*   HCT 24.3*   *   MCV 96   MCH 30.4   MCHC 31.7*       I have personally reviewed pertinent radiological imaging and reports.    Jorge Webb MD  [facility]  [address] [place] [address] [place] [address]  [phone] (p)  [phone] (f)

## 2020-03-21 NOTE — PT/OT/SLP PROGRESS
Physical Therapy Treatment    Patient Name:  Shara Hutchins   MRN:  [id number]    Recommendations:     Discharge Recommendations:  nursing facility, skilled   Discharge Equipment Recommendations: (TBD at next level of care)   Barriers to discharge: Patient may still need SNF prior to DC home with cousin    Assessment:     Sahra Hutchins is a 69 y.o. female admitted with a medical diagnosis of <principal problem not specified>.  She presents with the following impairments/functional limitations:  weakness, impaired endurance, impaired functional mobilty, gait instability, decreased lower extremity function, impaired balance, decreased ROM .  Patient agreeable to PT treatment but was reluctant to attempt gait due to breathlessness. Patient presented supine in bed and transferred to sitting with SBA but O2 sats went from 91% to 86% in sitting. After sitting for several minutes patient's O2 returned above 90% so patient stood at bedside. O2 sats remainin > 92 when standing but after about 90 seconds patient had to sit saying she was feeling weak. Patient then after resting several minutes,patient transferred to chair with min assist.    Rehab Prognosis: Good; patient would benefit from acute skilled PT services to address these deficits and reach maximum level of function.    Recent Surgery: * No surgery found *      Plan:     During this hospitalization, patient to be seen 6 x/week to address the identified rehab impairments via gait training, therapeutic activities, therapeutic exercises and progress toward the following goals:    · Plan of Care Expires:  03/18/20    Subjective     Chief Complaint: SOB  Patient/Family Comments/goals: breath better  Pain/Comfort:  ·        Objective:     Communicated with nurse [place] prior to session.  Patient found supine with pulse ox (continuous), peripheral IV, telemetry, bed alarm, oxygen upon PT entry to room.     General Precautions: Standard, fall   Orthopedic Precautions:N/A    Braces:       Functional Mobility:  · Bed Mobility:     · Supine to Sit: stand by assistance  · Transfers:     · Sit to Stand:  contact guard assistance with no AD  · Bed to Chair: contact guard assistance with  no AD  using  Step Transfer  · Gait: unable due to SOB      AM-PAC 6 CLICK MOBILITY          Therapeutic Activities and Exercises:   transfer training supine to sit with SBA, sit to stand with CGA, EOB to chair with SBA  Standing tolerance/balance x 90 seconds with CGA  Sitting tolerance/balance    Patient left up in chair with all lines intact, call button in reach and nurse notified..    GOALS:   Multidisciplinary Problems     Physical Therapy Goals        Problem: Physical Therapy Goal    Goal Priority Disciplines Outcome Goal Variances Interventions   Physical Therapy Goal     PT, PT/OT Ongoing, Progressing     Description:  Goals to be met by: D/C    Patient will increase functional independence with mobility by performin. Supine to sit with Modified [place]  2. Sit to stand transfer with Stand-by Assistance  3. Gait  x 100 feet with Stand-by Assistance using Rolling Walker.                       Time Tracking:     PT Received On: 20  PT Start Time: 920     PT Stop Time: 958  PT Total Time (min): 38 min     Billable Minutes: Therapeutic Activity 38    Treatment Type: Treatment  PT/PTA: PT     PTA Visit Number: 0     Adam Debrailligan, PT  2020

## 2020-03-21 NOTE — PROGRESS NOTES
[facility]  Pulmonology  Progress Note    Patient Name: Shara Hutchins  MRN: [id number]  Admission Date: 3/7/2020  Hospital Length of Stay: 12 days  Code Status: DNR  Attending Provider: Digna Griffin MD  Primary Care Provider: April Horton MD   Principal Problem: <principal problem not specified>    Subjective:     Interval History:     3/19/2020 - Stable overnight, no new issues reported.  Weight is up 2 kg from admit but that weight is up about 10 kg from last hospital stay.  No new respiratory complaints, remains dyspneic and on NC O2.chest X-ray yesterday was unchanged.  I/O negative 2.2 liters.  Had atrial fibrillation last night and is back in NSR on cardizem.    3/20/2020 - Feels beter today, on lasix drip.  Weight decreased 2.6 kg, I/O negative 6.3 liters yesterday.  No new complaints.    3/21/2020 - Continnues to feel better, no neew complaints.  TPN has been stopped and she has started eating.  Is about 12 liters negative over the last 2 days    Review of Systems   Constitutional: Positive for malaise/fatigue and weight loss. Negative for chills, diaphoresis and fever.   HENT: Negative for congestion.    Eyes: Negative for pain.   Respiratory: Positive for cough and shortness of breath. Negative for hemoptysis, sputum production, wheezing and stridor.    Cardiovascular: Positive for orthopnea and leg swelling. Negative for chest pain, palpitations, claudication and PND.   Gastrointestinal: Positive for diarrhea. Negative for abdominal pain, constipation, heartburn, nausea and vomiting.        Ostomy   Genitourinary: Negative for dysuria, frequency, hematuria and urgency.   Musculoskeletal: Negative for falls and myalgias.   Neurological: Positive for weakness (generalized). Negative for sensory change and focal weakness.   Psychiatric/Behavioral: Negative for depression and suicidal ideas. The patient is not nervous/anxious.          Objective:     Vital Signs (Most Recent):  Temp: 98.6  °F (37 °C) (03/21/20 1525)  Pulse: 98 (03/21/20 1547)  Resp: 20 (03/21/20 1547)  BP: 105/62 (03/21/20 1525)  SpO2: (!) 94 % (03/21/20 1547) Vital Signs (24h Range):  Temp:  [98.1 °F (36.7 °C)-99.1 °F (37.3 °C)] 98.6 °F (37 °C)  Pulse:  [] 98  Resp:  [18-30] 20  SpO2:  [90 %-97 %] 94 %  BP: (105-151)/(62-74) 105/62     Weight: 59.3 kg (130 lb 11.7 oz)  Body mass index is 20.17 kg/m².      Intake/Output Summary (Last 24 hours) at 3/21/2020 1654  Last data filed at 3/21/2020 1643  Gross per 24 hour   Intake 1916.54 ml   Output 6975 ml   Net -5058.46 ml       Physical Exam   Constitutional: She is oriented to person, place, and time. She appears well-developed and well-nourished. No distress.   Chronically ill female, nad  Sitting in bed   HENT:   Head: Normocephalic and atraumatic.   Right Ear: External ear normal.   Left Ear: External ear normal.   Nose: Nose normal.   Mouth/Throat: Oropharynx is clear and moist.   Eyes: Pupils are equal, round, and reactive to light. Conjunctivae and EOM are normal.   Neck: Normal range of motion. Neck supple. No JVD present. No tracheal deviation present. No thyromegaly present.   Cardiovascular: Normal rate, regular rhythm, normal heart sounds and intact distal pulses. Exam reveals no gallop and no friction rub.   No murmur heard.  Pulmonary/Chest: Effort normal. No stridor. No respiratory distress. She has no wheezes. She has rales. She exhibits no tenderness.   Decreased at bases   Abdominal: Soft. Bowel sounds are normal. She exhibits no distension. There is no tenderness. There is no rebound and no guarding.   + ostomy   Musculoskeletal: Normal range of motion. She exhibits edema (in dependent areas). She exhibits no tenderness.   Lymphadenopathy:     She has no cervical adenopathy.   Neurological: She is alert and oriented to person, place, and time. No cranial nerve deficit.   Skin: Skin is warm and dry. She is not diaphoretic.   Psychiatric: She has a normal mood and  affect. Her behavior is normal.   Nursing note and vitals reviewed.      Vents:  Oxygen Concentration (%): 45 (03/20/20 0702)    Lines/Drains/Airways     Peripherally Inserted Central Catheter Line            PICC Double Lumen 03/06/20 1154 other (see comments) 15 days          Drain                 Hemodialysis AV Fistula Left upper arm -- days         Ileostomy 02/13/20 1600 Loop RLQ 36 days         Urethral Catheter 03/16/20 2230 Straight-tip 18 Fr. 4 days                Significant Labs:    CBC/Anemia Profile:  Recent Labs   Lab 03/20/20  0557 03/21/20  0410   WBC 11.93 11.26   HGB 8.1* 7.7*   HCT 27.7* 24.3*    391*   * 96   RDW 17.5* 16.6*        Chemistries:  Recent Labs   Lab 03/19/20  1735 03/20/20  1027 03/21/20  0410   NA  --  135* 137   K 3.9 4.4 4.0   CL  --  99 100   CO2  --  26 28   BUN  --  29* 35*   CREATININE  --  1.7* 1.8*   CALCIUM  --  8.2* 8.3*   ALBUMIN  --  2.0* 2.0*   PROT  --  5.4* 5.4*   BILITOT  --  1.3* 1.2*   ALKPHOS  --  216* 206*   ALT  --  12 13   AST  --  23 24   MG  --  2.0 2.2   PHOS  --  2.7 3.3       All pertinent labs within the past 24 hours have been reviewed.    BNP - 1502    Microbiology Results (last 7 days)     Procedure Component Value Units Date/Time    Urine Culture High Risk [[id number]]  (Abnormal)  (Susceptibility) Collected:  03/15/20 2350    Order Status:  Completed Specimen:  Urine, Catheterized Updated:  03/17/20 0632     Urine Culture, Routine KLEBSIELLA PNEUMONIAE  >100,000 cfu/ml      Narrative:       Indicated criteria for high risk culture:->Other  Other (specify):->sick            Significant Imaging:  I have reviewed and interpreted all pertinent imaging results/findings within the past 24 hours.     CXR (3/18) - about the same, bilateral infiltrates c/w fluid  CXR (3/20) - about the same    ECHO (3/19)  · Mild concentric left ventricular hypertrophy.  · Pulmonary hypertension present.  · Normal left ventricular systolic function. The  estimated ejection fraction is 60%.  · Grade I (mild) left ventricular diastolic dysfunction consistent with impaired relaxation.  · No wall motion abnormalities.  · Normal right ventricular systolic function.  · Intermediate central venous pressure (8 mmHg).  · Trivial pericardial effusion.  · There is a left pleural effusion.  · The estimated PA systolic pressure is 47 mmHg.    Assessment/Plan:     Acute hypoxemic respiratory failure  · Due to pulmonary edema and volume overload  · Continue diuresis - better  · Minimize fluids  · O2 to keep sats > 90%  · Prn albuterol  · Check weight daily    Pulmonary hypertension  · I suspect group 2 (diastolic dysfunction)  · Follow, no acute treatment needed    Malnutrition  · Fairly severe, follow  · TPN stopped and oral diet increased    Debility  · PT, OT as able    Unstageable pressure injury of skin and tissue  · Continue with local care    DNR (do not resuscitate)  · Aware     S/P ileostomy  · Aware     Chronic diarrhea  · S/p ostomy  · Pt reports that stools have more substance    Stage 5 chronic kidney disease not on chronic dialysis  · Aware     Iron deficiency anemia due to chronic blood loss  · Aware, has some minor blood in lee  · Transfuse for hgb < 7    Inflammatory bowel disease  · Aware            Moo Maharaj MD  Pulmonology  [facility]

## 2020-03-21 NOTE — PROGRESS NOTES
Replaced by [facility]  Cardiology  Progress Note    Patient Name: Shara Hutchins  MRN: [id number]  Admission Date: 3/7/2020  Hospital Length of Stay: 12 days  Code Status: DNR   Attending Physician: Digna Griffin MD   Primary Care Physician: April Horton MD  Expected Discharge Date:   Principal Problem:<principal problem not specified>    Subjective:     Hospital Course:     Interval History:  Feels much better.  Appetite is good    ROS  Objective:     Vital Signs (Most Recent):  Temp: 98.6 °F (37 °C) (03/21/20 1525)  Pulse: 98 (03/21/20 1547)  Resp: 20 (03/21/20 1547)  BP: 105/62 (03/21/20 1525)  SpO2: (!) 94 % (03/21/20 1547) Vital Signs (24h Range):  Temp:  [98.1 °F (36.7 °C)-99.1 °F (37.3 °C)] 98.6 °F (37 °C)  Pulse:  [] 98  Resp:  [18-30] 20  SpO2:  [90 %-97 %] 94 %  BP: (105-151)/(62-74) 105/62     Weight: 59.3 kg (130 lb 11.7 oz)  Body mass index is 20.17 kg/m².    SpO2: (!) 94 %  O2 Device (Oxygen Therapy): nasal cannula w/ humidification      Intake/Output Summary (Last 24 hours) at 3/21/2020 1639  Last data filed at 3/21/2020 1500  Gross per 24 hour   Intake 1906.75 ml   Output 6975 ml   Net -5068.25 ml       Lines/Drains/Airways     Peripherally Inserted Central Catheter Line            PICC Double Lumen 03/06/20 1154 other (see comments) 15 days          Drain                 Hemodialysis AV Fistula Left upper arm -- days         Ileostomy 02/13/20 1600 Loop RLQ 36 days         Urethral Catheter 03/16/20 2230 Straight-tip 18 Fr. 4 days                Physical Exam breathing comfortably O2 sats are stable on room air  Normal heart sounds  Sub presently clear lung fields  Significant Labs:   BMP:   Recent Labs   Lab 03/19/20  1735 03/20/20  1027 03/21/20  0410   GLU  --  140* 129*   NA  --  135* 137   K 3.9 4.4 4.0   CL  --  99 100   CO2  --  26 28   BUN  --  29* 35*   CREATININE  --  1.7* 1.8*   CALCIUM  --  8.2* 8.3*   MG  --  2.0 2.2    and CBC   Recent Labs   Lab 03/20/20  0565  03/21/20  0410   WBC 11.93 11.26   HGB 8.1* 7.7*   HCT 27.7* 24.3*    391*       Significant Imaging: X-Ray: CXR: X-Ray Chest 1 View (CXR): No results found for this visit on 03/07/20.  Assessment and Plan:  Acute on chronic diastolic CHF probably worsened by anemia.  Mal nutrition secondary to ileostomy losses  Plan  I note BUN creatinine going up she does not have much peripheral edema unguarded and stop Lasix drip and place her on torsemide 20 mg twice daily orally     Brief HPI:     Active Diagnoses:    Diagnosis Date Noted POA    Pulmonary hypertension [I27.20] 03/20/2020 Unknown    Acute hypoxemic respiratory failure [J96.01] 03/18/2020 No    Malnutrition [E46] 03/07/2020 Yes    Debility [R53.81] 03/07/2020 Yes    Unstageable pressure injury of skin and tissue [L89.95] 03/06/2020 Yes    DNR (do not resuscitate) [Z66] 02/24/2020 Yes    S/P ileostomy [Z93.2] 02/13/2020 Not Applicable    Chronic diarrhea [K52.9] 02/10/2020 Yes     Chronic    Anemia of chronic disease [D63.8] 02/10/2020 Yes     Chronic    Stage 5 chronic kidney disease not on chronic dialysis [N18.5] 11/04/2018 Yes     Chronic    Iron deficiency anemia due to chronic blood loss [D50.0] 01/07/2018 Yes     Chronic    Inflammatory bowel disease [K52.9] 01/07/2018 Yes     Chronic      Problems Resolved During this Admission:    Diagnosis Date Noted Date Resolved POA    Thrombocytopenia, unspecified [D69.6] 03/07/2020 03/18/2020 Yes    Acute renal failure superimposed on stage 5 chronic kidney disease, not on chronic dialysis [N17.9, N18.5] 02/10/2020 03/18/2020 Yes       VTE Risk Mitigation (From admission, onward)         Ordered     enoxaparin injection 30 mg  Every 24 hours (non-standard times)      03/18/20 1524     Place sequential compression device  Until discontinued      03/08/20 0133                Ramón Alicia MD  Cardiology  [facility]

## 2020-03-21 NOTE — PLAN OF CARE
03/21/20 0735   Patient Assessment/Suction   Level of Consciousness (AVPU) alert   Respiratory Effort Normal;Unlabored   All Lung Fields Breath Sounds clear   PRE-TX-O2   O2 Device (Oxygen Therapy) nasal cannula w/ humidification   $ Is the patient on Low Flow Oxygen? Yes   Flow (L/min) 6   SpO2 95 %   Pulse Oximetry Type Continuous   $ Pulse Oximetry - Multiple Charge Pulse Oximetry - Multiple   Pulse 95   Resp 18   Inhaler   $ Inhaler Charges MDI (Metered Dose Inahler) Treatment   Daily Review of Necessity (Inhaler) completed   Respiratory Treatment Status (Inhaler) given   Treatment Route (Inhaler) mouthpiece;spacer/holding chamber   Patient Position (Inhaler) HOB elevated   Post Treatment Assessment (Inhaler) breath sounds improved   Signs of Intolerance (Inhaler) none   Respiratory Evaluation   $ Care Plan Tech Time 15 min

## 2020-03-21 NOTE — PLAN OF CARE
03/20/20 2000   Patient Assessment/Suction   Level of Consciousness (AVPU) alert   Respiratory Effort Normal;Unlabored   Expansion/Accessory Muscles/Retractions no use of accessory muscles   All Lung Fields Breath Sounds Anterior:;diminished   Rhythm/Pattern, Respiratory depth regular;pattern regular   Cough Frequency no cough   PRE-TX-O2   O2 Device (Oxygen Therapy) nasal cannula   $ Is the patient on Low Flow Oxygen? Yes   Flow (L/min) 6   SpO2 (!) 94 %   Pulse Oximetry Type Continuous   $ Pulse Oximetry - Multiple Charge Pulse Oximetry - Multiple   Pulse 98   Resp (!) 24   Positioning   Body Position positioned/repositioned independently   Head of Bed (HOB) HOB elevated   Inhaler   $ Inhaler Charges MDI (Metered Dose Inahler) Treatment;Given With Spacer   Daily Review of Necessity (Inhaler) completed   Respiratory Treatment Status (Inhaler) given   Treatment Route (Inhaler) mouthpiece;spacer/holding chamber   Patient Position (Inhaler) HOB elevated   Post Treatment Assessment (Inhaler) breath sounds unchanged   Signs of Intolerance (Inhaler) none   Breath Sounds Post-Respiratory Treatment   Throughout All Fields Post-Treatment All Fields   Throughout All Fields Post-Treatment Anterior:;no change   Post-treatment Heart Rate (beats/min) 99   Post-treatment Resp Rate (breaths/min) 26   Respiratory Evaluation   $ Care Plan Tech Time 15 min

## 2020-03-21 NOTE — PLAN OF CARE
Plan of care reviewed with the patient. Cardiac, vital signs, and lab monitoring. Maintain IV  lasix gtt and TPN per order. Increase activity as tolerated. Bed alarm on. Watch for falls. Breathing treatments and adjust oxygen as needed. Strict I&O. Daily weights.

## 2020-03-21 NOTE — PROGRESS NOTES
"TPN PROGRESS NOTE:  Day # 21    Objective:  Diagnosis/Indication for TPN: high ostomy output, severe malnutrition    69 y.o., female    The patient has the following labs:  Lab Results   Component Value Date     03/21/2020    K 4.0 03/21/2020     03/21/2020    CO2 28 03/21/2020    BUN 35 (H) 03/21/2020    CREATININE 1.8 (H) 03/21/2020    CALCIUM 8.3 (L) 03/21/2020        IV Access:  Central    Diet/Tube Feeding:  Adult Regular    Assessment:  Ideal Body Weight = 62.8 kg  Actual Body Weight = 94% IBW  Estimated body mass index is 20.17 kg/m² as calculated from the following:    Height as of this encounter: 5' 7.5" (1.715 m).    Weight as of this encounter: 59.3 kg (130 lb 11.7 oz).   Estimated Creatinine Clearance: 27.6 mL/min (A) (based on SCr of 1.8 mg/dL (H)).    Plan:  New TPN ordered to be initiated at 1700.    Today's TPN provides 78 gm protein, 301 gm dextrose, 60 gm lipid and 1935 kcal.    Will continue to monitor electrolytes daily and adjust parenteral nutrition as warranted.    Thank you for allowing us to participate in this patient's care.     Guerrero Hall 3/21/2020 1:32 PM  Nutrition Support Team  Clinical Dietician, Ext. [phone]  ID Clinical Pharmacist, Ext [phone]  Pharmacist, Ext [phone]   "

## 2020-03-22 LAB
ALBUMIN SERPL BCP-MCNC: 2 G/DL (ref 3.5–5.2)
ALP SERPL-CCNC: 211 U/L (ref 55–135)
ALT SERPL W/O P-5'-P-CCNC: 16 U/L (ref 10–44)
ANION GAP SERPL CALC-SCNC: 8 MMOL/L (ref 8–16)
AST SERPL-CCNC: 28 U/L (ref 10–40)
BILIRUB SERPL-MCNC: 1.1 MG/DL (ref 0.1–1)
BLD PROD TYP BPU: NORMAL
BLOOD UNIT EXPIRATION DATE: NORMAL
BLOOD UNIT TYPE CODE: 6200
BLOOD UNIT TYPE: NORMAL
BUN SERPL-MCNC: 36 MG/DL (ref 8–23)
CALCIUM SERPL-MCNC: 8.1 MG/DL (ref 8.7–10.5)
CHLORIDE SERPL-SCNC: 101 MMOL/L (ref 95–110)
CO2 SERPL-SCNC: 27 MMOL/L (ref 23–29)
CODING SYSTEM: NORMAL
CREAT SERPL-MCNC: 1.8 MG/DL (ref 0.5–1.4)
DISPENSE STATUS: NORMAL
ERYTHROCYTE [DISTWIDTH] IN BLOOD BY AUTOMATED COUNT: 16.7 % (ref 11.5–14.5)
EST. GFR  (AFRICAN AMERICAN): 32.6 ML/MIN/1.73 M^2
EST. GFR  (NON AFRICAN AMERICAN): 28.3 ML/MIN/1.73 M^2
GLUCOSE SERPL-MCNC: 117 MG/DL (ref 70–110)
HCT VFR BLD AUTO: 22.5 % (ref 37–48.5)
HGB BLD-MCNC: 7 G/DL (ref 12–16)
MAGNESIUM SERPL-MCNC: 2.2 MG/DL (ref 1.6–2.6)
MCH RBC QN AUTO: 30.2 PG (ref 27–31)
MCHC RBC AUTO-ENTMCNC: 31.1 G/DL (ref 32–36)
MCV RBC AUTO: 97 FL (ref 82–98)
NUM UNITS TRANS PACKED RBC: NORMAL
PHOSPHATE SERPL-MCNC: 3.6 MG/DL (ref 2.7–4.5)
PLATELET # BLD AUTO: 391 K/UL (ref 150–350)
PMV BLD AUTO: 11.5 FL (ref 9.2–12.9)
POTASSIUM SERPL-SCNC: 4 MMOL/L (ref 3.5–5.1)
PROT SERPL-MCNC: 5.3 G/DL (ref 6–8.4)
RBC # BLD AUTO: 2.32 M/UL (ref 4–5.4)
SODIUM SERPL-SCNC: 136 MMOL/L (ref 136–145)
WBC # BLD AUTO: 10.15 K/UL (ref 3.9–12.7)

## 2020-03-22 PROCEDURE — 94761 N-INVAS EAR/PLS OXIMETRY MLT: CPT

## 2020-03-22 PROCEDURE — 25000003 PHARM REV CODE 250: Performed by: INTERNAL MEDICINE

## 2020-03-22 PROCEDURE — C9113 INJ PANTOPRAZOLE SODIUM, VIA: HCPCS | Performed by: INTERNAL MEDICINE

## 2020-03-22 PROCEDURE — 36430 TRANSFUSION BLD/BLD COMPNT: CPT

## 2020-03-22 PROCEDURE — 63600175 PHARM REV CODE 636 W HCPCS: Performed by: INTERNAL MEDICINE

## 2020-03-22 PROCEDURE — 21000000 HC CCU ICU ROOM CHARGE

## 2020-03-22 PROCEDURE — 99232 PR SUBSEQUENT HOSPITAL CARE,LEVL II: ICD-10-PCS | Mod: ,,, | Performed by: INTERNAL MEDICINE

## 2020-03-22 PROCEDURE — 80053 COMPREHEN METABOLIC PANEL: CPT

## 2020-03-22 PROCEDURE — 25000003 PHARM REV CODE 250: Performed by: FAMILY MEDICINE

## 2020-03-22 PROCEDURE — 94660 CPAP INITIATION&MGMT: CPT

## 2020-03-22 PROCEDURE — 25000003 PHARM REV CODE 250: Performed by: STUDENT IN AN ORGANIZED HEALTH CARE EDUCATION/TRAINING PROGRAM

## 2020-03-22 PROCEDURE — 84100 ASSAY OF PHOSPHORUS: CPT

## 2020-03-22 PROCEDURE — 99900035 HC TECH TIME PER 15 MIN (STAT)

## 2020-03-22 PROCEDURE — 94640 AIRWAY INHALATION TREATMENT: CPT

## 2020-03-22 PROCEDURE — 27000221 HC OXYGEN, UP TO 24 HOURS

## 2020-03-22 PROCEDURE — 99232 SBSQ HOSP IP/OBS MODERATE 35: CPT | Mod: ,,, | Performed by: INTERNAL MEDICINE

## 2020-03-22 PROCEDURE — 83735 ASSAY OF MAGNESIUM: CPT

## 2020-03-22 PROCEDURE — 85027 COMPLETE CBC AUTOMATED: CPT

## 2020-03-22 PROCEDURE — P9016 RBC LEUKOCYTES REDUCED: HCPCS

## 2020-03-22 RX ORDER — HYDROCODONE BITARTRATE AND ACETAMINOPHEN 500; 5 MG/1; MG/1
TABLET ORAL
Status: DISCONTINUED | OUTPATIENT
Start: 2020-03-22 | End: 2020-03-27 | Stop reason: HOSPADM

## 2020-03-22 RX ORDER — FUROSEMIDE 10 MG/ML
20 INJECTION INTRAMUSCULAR; INTRAVENOUS ONCE AS NEEDED
Status: DISCONTINUED | OUTPATIENT
Start: 2020-03-22 | End: 2020-03-25

## 2020-03-22 RX ADMIN — ENOXAPARIN SODIUM 30 MG: 100 INJECTION SUBCUTANEOUS at 05:03

## 2020-03-22 RX ADMIN — DIPHENOXYLATE HYDROCHLORIDE AND ATROPINE SULFATE 1 TABLET: 2.5; .025 TABLET ORAL at 04:03

## 2020-03-22 RX ADMIN — LOPERAMIDE HYDROCHLORIDE 4 MG: 2 CAPSULE ORAL at 04:03

## 2020-03-22 RX ADMIN — HYDROCODONE BITARTRATE AND ACETAMINOPHEN 1 TABLET: 5; 325 TABLET ORAL at 08:03

## 2020-03-22 RX ADMIN — PSYLLIUM HUSK 2 PACKET: 3.4 POWDER ORAL at 04:03

## 2020-03-22 RX ADMIN — TORSEMIDE 20 MG: 20 TABLET ORAL at 05:03

## 2020-03-22 RX ADMIN — LEVOTHYROXINE SODIUM 88 MCG: 88 TABLET ORAL at 06:03

## 2020-03-22 RX ADMIN — MAGNESIUM OXIDE 400 MG: 400 TABLET ORAL at 08:03

## 2020-03-22 RX ADMIN — SERTRALINE HYDROCHLORIDE 50 MG: 50 TABLET ORAL at 08:03

## 2020-03-22 RX ADMIN — LOPERAMIDE HYDROCHLORIDE 4 MG: 2 CAPSULE ORAL at 08:03

## 2020-03-22 RX ADMIN — Medication: at 08:03

## 2020-03-22 RX ADMIN — HYOSCYAMINE SULFATE 0.12 MG: 0.12 TABLET ORAL at 08:03

## 2020-03-22 RX ADMIN — DIPHENHYDRAMINE HYDROCHLORIDE 6.25 MG: 50 INJECTION INTRAMUSCULAR; INTRAVENOUS at 05:03

## 2020-03-22 RX ADMIN — ALBUTEROL SULFATE 2 PUFF: 90 AEROSOL, METERED RESPIRATORY (INHALATION) at 07:03

## 2020-03-22 RX ADMIN — DRONABINOL 2.5 MG: 2.5 CAPSULE ORAL at 04:03

## 2020-03-22 RX ADMIN — TORSEMIDE 20 MG: 20 TABLET ORAL at 08:03

## 2020-03-22 RX ADMIN — AZITHROMYCIN MONOHYDRATE 500 MG: 500 INJECTION, POWDER, LYOPHILIZED, FOR SOLUTION INTRAVENOUS at 08:03

## 2020-03-22 RX ADMIN — CYCLOBENZAPRINE HYDROCHLORIDE 5 MG: 5 TABLET, FILM COATED ORAL at 07:03

## 2020-03-22 RX ADMIN — DRONABINOL 2.5 MG: 2.5 CAPSULE ORAL at 06:03

## 2020-03-22 RX ADMIN — SALINE NASAL SPRAY 1 SPRAY: 1.5 SOLUTION NASAL at 08:03

## 2020-03-22 RX ADMIN — DIPHENOXYLATE HYDROCHLORIDE AND ATROPINE SULFATE 1 TABLET: 2.5; .025 TABLET ORAL at 08:03

## 2020-03-22 RX ADMIN — PSYLLIUM HUSK 2 PACKET: 3.4 POWDER ORAL at 08:03

## 2020-03-22 RX ADMIN — PANTOPRAZOLE SODIUM 40 MG: 40 INJECTION, POWDER, LYOPHILIZED, FOR SOLUTION INTRAVENOUS at 08:03

## 2020-03-22 RX ADMIN — DIPHENHYDRAMINE HYDROCHLORIDE 6.25 MG: 50 INJECTION INTRAMUSCULAR; INTRAVENOUS at 11:03

## 2020-03-22 RX ADMIN — ALBUTEROL SULFATE 2 PUFF: 90 AEROSOL, METERED RESPIRATORY (INHALATION) at 08:03

## 2020-03-22 RX ADMIN — DIPHENHYDRAMINE HYDROCHLORIDE 6.25 MG: 50 INJECTION INTRAMUSCULAR; INTRAVENOUS at 12:03

## 2020-03-22 RX ADMIN — DIPHENHYDRAMINE HYDROCHLORIDE 6.25 MG: 50 INJECTION INTRAMUSCULAR; INTRAVENOUS at 06:03

## 2020-03-22 RX ADMIN — ALBUTEROL SULFATE 2 PUFF: 90 AEROSOL, METERED RESPIRATORY (INHALATION) at 01:03

## 2020-03-22 NOTE — SUBJECTIVE & OBJECTIVE
Interval History:     3/19/2020 - Stable overnight, no new issues reported.  Weight is up 2 kg from admit but that weight is up about 10 kg from last hospital stay.  No new respiratory complaints, remains dyspneic and on NC O2.chest X-ray yesterday was unchanged.  I/O negative 2.2 liters.  Had atrial fibrillation last night and is back in NSR on cardizem.    3/20/2020 - Feels beter today, on lasix drip.  Weight decreased 2.6 kg, I/O negative 6.3 liters yesterday.  No new complaints.    3/21/2020 - Continnues to feel better, no neew complaints.  TPN has been stopped and she has started eating.  Is about 12 liters negative over the last 2 days    3/22/2020 - Stable overnight, no new complaints, she tells me that they are working on transfer to a rehab hospital. I/O 16.3 liters negative over last 3 days, no new weight today  Review of Systems   Constitutional: Positive for malaise/fatigue and weight loss. Negative for chills, diaphoresis and fever.   HENT: Negative for congestion.    Eyes: Negative for pain.   Respiratory: Positive for cough and shortness of breath. Negative for hemoptysis, sputum production, wheezing and stridor.    Cardiovascular: Positive for orthopnea and leg swelling. Negative for chest pain, palpitations, claudication and PND.   Gastrointestinal: Positive for diarrhea. Negative for abdominal pain, constipation, heartburn, nausea and vomiting.        Ostomy   Genitourinary: Negative for dysuria, frequency, hematuria and urgency.   Musculoskeletal: Negative for falls and myalgias.   Neurological: Positive for weakness (generalized). Negative for sensory change and focal weakness.   Psychiatric/Behavioral: Negative for depression and suicidal ideas. The patient is not nervous/anxious.          Objective:     Vital Signs (Most Recent):  Temp: 98.3 °F (36.8 °C) (03/22/20 1541)  Pulse: 86 (03/22/20 1541)  Resp: 20 (03/22/20 1541)  BP: 117/67 (03/22/20 1541)  SpO2: 100 % (03/22/20 1541) Vital Signs (24h  Range):  Temp:  [97.7 °F (36.5 °C)-99 °F (37.2 °C)] 98.3 °F (36.8 °C)  Pulse:  [] 86  Resp:  [18-24] 20  SpO2:  [92 %-100 %] 100 %  BP: (107-130)/(57-67) 117/67     Weight: 59.3 kg (130 lb 11.7 oz)  Body mass index is 20.17 kg/m².      Intake/Output Summary (Last 24 hours) at 3/22/2020 1609  Last data filed at 3/22/2020 1543  Gross per 24 hour   Intake 1515.59 ml   Output 6050 ml   Net -4534.41 ml       Physical Exam   Constitutional: She is oriented to person, place, and time. She appears well-developed and well-nourished. No distress.   Chronically ill female, nad  Sitting in bed   HENT:   Head: Normocephalic and atraumatic.   Right Ear: External ear normal.   Left Ear: External ear normal.   Nose: Nose normal.   Mouth/Throat: Oropharynx is clear and moist.   Eyes: Pupils are equal, round, and reactive to light. Conjunctivae and EOM are normal.   Neck: Normal range of motion. Neck supple. No JVD present. No tracheal deviation present. No thyromegaly present.   Cardiovascular: Normal rate, regular rhythm, normal heart sounds and intact distal pulses. Exam reveals no gallop and no friction rub.   No murmur heard.  Pulmonary/Chest: Effort normal. No stridor. No respiratory distress. She has no wheezes. She has rales. She exhibits no tenderness.   Decreased at bases   Abdominal: Soft. Bowel sounds are normal. She exhibits no distension. There is no tenderness. There is no rebound and no guarding.   + ostomy   Musculoskeletal: Normal range of motion. She exhibits edema (in dependent areas). She exhibits no tenderness.   Lymphadenopathy:     She has no cervical adenopathy.   Neurological: She is alert and oriented to person, place, and time. No cranial nerve deficit.   Skin: Skin is warm and dry. She is not diaphoretic.   Psychiatric: She has a normal mood and affect. Her behavior is normal.   Nursing note and vitals reviewed.      Vents:  Oxygen Concentration (%): 45 (03/20/20 0702)    Lines/Drains/Airways      Peripherally Inserted Central Catheter Line            PICC Double Lumen 03/06/20 1154 other (see comments) 16 days          Drain                 Hemodialysis AV Fistula Left upper arm -- days         Ileostomy 02/13/20 1600 Loop RLQ 37 days         Urethral Catheter 03/16/20 2230 Straight-tip 18 Fr. 5 days                Significant Labs:    CBC/Anemia Profile:  Recent Labs   Lab 03/21/20  0410 03/22/20  0342   WBC 11.26 10.15   HGB 7.7* 7.0*   HCT 24.3* 22.5*   * 391*   MCV 96 97   RDW 16.6* 16.7*        Chemistries:  Recent Labs   Lab 03/21/20  0410 03/22/20  0342    136   K 4.0 4.0    101   CO2 28 27   BUN 35* 36*   CREATININE 1.8* 1.8*   CALCIUM 8.3* 8.1*   ALBUMIN 2.0* 2.0*   PROT 5.4* 5.3*   BILITOT 1.2* 1.1*   ALKPHOS 206* 211*   ALT 13 16   AST 24 28   MG 2.2 2.2   PHOS 3.3 3.6       All pertinent labs within the past 24 hours have been reviewed.    BNP - 1502    Microbiology Results (last 7 days)     Procedure Component Value Units Date/Time    Urine Culture High Risk [[id number]]  (Abnormal)  (Susceptibility) Collected:  03/15/20 2350    Order Status:  Completed Specimen:  Urine, Catheterized Updated:  03/17/20 0632     Urine Culture, Routine KLEBSIELLA PNEUMONIAE  >100,000 cfu/ml      Narrative:       Indicated criteria for high risk culture:->Other  Other (specify):->sick            Significant Imaging:  I have reviewed and interpreted all pertinent imaging results/findings within the past 24 hours.     CXR (3/18) - about the same, bilateral infiltrates c/w fluid  CXR (3/20) - about the same    ECHO (3/19)  · Mild concentric left ventricular hypertrophy.  · Pulmonary hypertension present.  · Normal left ventricular systolic function. The estimated ejection fraction is 60%.  · Grade I (mild) left ventricular diastolic dysfunction consistent with impaired relaxation.  · No wall motion abnormalities.  · Normal right ventricular systolic function.  · Intermediate central venous pressure  (8 mmHg).  · Trivial pericardial effusion.  · There is a left pleural effusion.  · The estimated PA systolic pressure is 47 mmHg.

## 2020-03-22 NOTE — PLAN OF CARE
03/21/20 1916   Patient Assessment/Suction   Respiratory Effort Unlabored   ROHINI Breath Sounds crackles   LLL Breath Sounds crackles   RUL Breath Sounds clear   RML Breath Sounds crackles   RLL Breath Sounds crackles   Rhythm/Pattern, Respiratory pattern regular   PRE-TX-O2   O2 Device (Oxygen Therapy) nasal cannula   Flow (L/min) 4   SpO2 (!) 93 %   Pulse Oximetry Type Continuous   Pulse 93   Resp 20   Inhaler   $ Inhaler Charges MDI (Metered Dose Inahler) Treatment;Given With Spacer;Independent   Respiratory Treatment Status (Inhaler) given   Treatment Route (Inhaler) mouthpiece;breath hold   Patient Position (Inhaler) HOB elevated   Post Treatment Assessment (Inhaler) breath sounds unchanged   Signs of Intolerance (Inhaler) none   Breath Sounds Post-Respiratory Treatment   Throughout All Fields Post-Treatment All Fields   Throughout All Fields Post-Treatment no change   Post-treatment Heart Rate (beats/min) 100   Post-treatment Resp Rate (breaths/min) 14   Respiratory Evaluation   $ Care Plan Tech Time 15 min   Evaluation For Re-Eval 5+ day   Admitting Diagnosis inflammatory bowel disease

## 2020-03-22 NOTE — PROGRESS NOTES
[facility]  Pulmonology  Progress Note    Patient Name: Shara Hutchins  MRN: [id number]  Admission Date: 3/7/2020  Hospital Length of Stay: 13 days  Code Status: DNR  Attending Provider: Digna Griffin MD  Primary Care Provider: April Horton MD   Principal Problem: <principal problem not specified>    Subjective:     Interval History:     3/19/2020 - Stable overnight, no new issues reported.  Weight is up 2 kg from admit but that weight is up about 10 kg from last hospital stay.  No new respiratory complaints, remains dyspneic and on NC O2.chest X-ray yesterday was unchanged.  I/O negative 2.2 liters.  Had atrial fibrillation last night and is back in NSR on cardizem.    3/20/2020 - Feels beter today, on lasix drip.  Weight decreased 2.6 kg, I/O negative 6.3 liters yesterday.  No new complaints.    3/21/2020 - Continnues to feel better, no neew complaints.  TPN has been stopped and she has started eating.  Is about 12 liters negative over the last 2 days    3/22/2020 - Stable overnight, no new complaints, she tells me that they are working on transfer to a rehab hospital. I/O 16.3 liters negative over last 3 days, no new weight today  Review of Systems   Constitutional: Positive for malaise/fatigue and weight loss. Negative for chills, diaphoresis and fever.   HENT: Negative for congestion.    Eyes: Negative for pain.   Respiratory: Positive for cough and shortness of breath. Negative for hemoptysis, sputum production, wheezing and stridor.    Cardiovascular: Positive for orthopnea and leg swelling. Negative for chest pain, palpitations, claudication and PND.   Gastrointestinal: Positive for diarrhea. Negative for abdominal pain, constipation, heartburn, nausea and vomiting.        Ostomy   Genitourinary: Negative for dysuria, frequency, hematuria and urgency.   Musculoskeletal: Negative for falls and myalgias.   Neurological: Positive for weakness (generalized). Negative for sensory change and  focal weakness.   Psychiatric/Behavioral: Negative for depression and suicidal ideas. The patient is not nervous/anxious.          Objective:     Vital Signs (Most Recent):  Temp: 98.3 °F (36.8 °C) (03/22/20 1541)  Pulse: 86 (03/22/20 1541)  Resp: 20 (03/22/20 1541)  BP: 117/67 (03/22/20 1541)  SpO2: 100 % (03/22/20 1541) Vital Signs (24h Range):  Temp:  [97.7 °F (36.5 °C)-99 °F (37.2 °C)] 98.3 °F (36.8 °C)  Pulse:  [] 86  Resp:  [18-24] 20  SpO2:  [92 %-100 %] 100 %  BP: (107-130)/(57-67) 117/67     Weight: 59.3 kg (130 lb 11.7 oz)  Body mass index is 20.17 kg/m².      Intake/Output Summary (Last 24 hours) at 3/22/2020 1609  Last data filed at 3/22/2020 1543  Gross per 24 hour   Intake 1515.59 ml   Output 6050 ml   Net -4534.41 ml       Physical Exam   Constitutional: She is oriented to person, place, and time. She appears well-developed and well-nourished. No distress.   Chronically ill female, nad  Sitting in bed   HENT:   Head: Normocephalic and atraumatic.   Right Ear: External ear normal.   Left Ear: External ear normal.   Nose: Nose normal.   Mouth/Throat: Oropharynx is clear and moist.   Eyes: Pupils are equal, round, and reactive to light. Conjunctivae and EOM are normal.   Neck: Normal range of motion. Neck supple. No JVD present. No tracheal deviation present. No thyromegaly present.   Cardiovascular: Normal rate, regular rhythm, normal heart sounds and intact distal pulses. Exam reveals no gallop and no friction rub.   No murmur heard.  Pulmonary/Chest: Effort normal. No stridor. No respiratory distress. She has no wheezes. She has rales. She exhibits no tenderness.   Decreased at bases   Abdominal: Soft. Bowel sounds are normal. She exhibits no distension. There is no tenderness. There is no rebound and no guarding.   + ostomy   Musculoskeletal: Normal range of motion. She exhibits edema (in dependent areas). She exhibits no tenderness.   Lymphadenopathy:     She has no cervical adenopathy.    Neurological: She is alert and oriented to person, place, and time. No cranial nerve deficit.   Skin: Skin is warm and dry. She is not diaphoretic.   Psychiatric: She has a normal mood and affect. Her behavior is normal.   Nursing note and vitals reviewed.      Vents:  Oxygen Concentration (%): 45 (03/20/20 0702)    Lines/Drains/Airways     Peripherally Inserted Central Catheter Line            PICC Double Lumen 03/06/20 1154 other (see comments) 16 days          Drain                 Hemodialysis AV Fistula Left upper arm -- days         Ileostomy 02/13/20 1600 Loop RLQ 37 days         Urethral Catheter 03/16/20 2230 Straight-tip 18 Fr. 5 days                Significant Labs:    CBC/Anemia Profile:  Recent Labs   Lab 03/21/20  0410 03/22/20  0342   WBC 11.26 10.15   HGB 7.7* 7.0*   HCT 24.3* 22.5*   * 391*   MCV 96 97   RDW 16.6* 16.7*        Chemistries:  Recent Labs   Lab 03/21/20  0410 03/22/20  0342    136   K 4.0 4.0    101   CO2 28 27   BUN 35* 36*   CREATININE 1.8* 1.8*   CALCIUM 8.3* 8.1*   ALBUMIN 2.0* 2.0*   PROT 5.4* 5.3*   BILITOT 1.2* 1.1*   ALKPHOS 206* 211*   ALT 13 16   AST 24 28   MG 2.2 2.2   PHOS 3.3 3.6       All pertinent labs within the past 24 hours have been reviewed.    BNP - 1502    Microbiology Results (last 7 days)     Procedure Component Value Units Date/Time    Urine Culture High Risk [[id number]]  (Abnormal)  (Susceptibility) Collected:  03/15/20 2350    Order Status:  Completed Specimen:  Urine, Catheterized Updated:  03/17/20 0632     Urine Culture, Routine KLEBSIELLA PNEUMONIAE  >100,000 cfu/ml      Narrative:       Indicated criteria for high risk culture:->Other  Other (specify):->sick            Significant Imaging:  I have reviewed and interpreted all pertinent imaging results/findings within the past 24 hours.     CXR (3/18) - about the same, bilateral infiltrates c/w fluid  CXR (3/20) - about the same    ECHO (3/19)  · Mild concentric left ventricular  hypertrophy.  · Pulmonary hypertension present.  · Normal left ventricular systolic function. The estimated ejection fraction is 60%.  · Grade I (mild) left ventricular diastolic dysfunction consistent with impaired relaxation.  · No wall motion abnormalities.  · Normal right ventricular systolic function.  · Intermediate central venous pressure (8 mmHg).  · Trivial pericardial effusion.  · There is a left pleural effusion.  · The estimated PA systolic pressure is 47 mmHg.    Assessment/Plan:     Acute hypoxemic respiratory failure  · Due to pulmonary edema and volume overload  · Continue diuresis - better  · Minimize fluids  · O2 to keep sats > 90%  · Prn albuterol  · Check weight daily    Pulmonary hypertension  · I suspect group 2 (diastolic dysfunction)  · Follow, no acute treatment needed    Malnutrition  · Fairly severe, follow  · TPN stopped and oral diet increased    Debility  · PT, OT as able    Unstageable pressure injury of skin and tissue  · Continue with local care    DNR (do not resuscitate)  · Aware     S/P ileostomy  · Aware     Chronic diarrhea  · S/p ostomy  · Pt reports that stools have more substance    Stage 5 chronic kidney disease not on chronic dialysis  · Aware     Iron deficiency anemia due to chronic blood loss  · Aware, has some minor blood in lee  · Transfuse for hgb < 7    Inflammatory bowel disease  · Aware            Moo Maharaj MD  Pulmonology  [facility]

## 2020-03-22 NOTE — PROGRESS NOTES
[facility] Medicine Progress Note    Patient Name: Shara Hutchins MRN: [id number]   Patient Class: IP- Inpatient  Length of Stay: 13   Admission Date: 3/7/2020  8:42 PM Attending Physician: Digna Griffin MD   Primary Care Provider: April Horton MD Face-to-Face encounter date: 03/22/2020   Chief Complaint: No chief complaint on file.        Subjective:    Hospital course:  Patient was admitted for severe dehydration and malnutrition with 20 lb weight loss at home due to high output from the ileostomy.  Patient was started on TPN and IV fluids.  Patient continued to have high output to the ileostomy with multiple anti-diarrheal agents.  Patient was started on octreotide with improved diarrhea.  Patient's symptoms improved during hospitalization.  Patient was tolerating diet and had improved nutrition.  Octreotide was discontinued however patient started having watery diarrhea and increased output.  Discussed with surgery who has concerns for short gut syndrome.  Plan for reversal of ileostomy in several weeks.  Adjusted patient's antidiarrhea medication with improved output from the ostomy.  Discontinue octreotide.  Clinical course complicated by recurrent DARCI and third-spacing causing pitting edema.  3/16:  Has increased shortness of breath.  Requiring 2 L nasal cannula saturating 94%.  Continues to have pitting edema and upper and lower extremities.  Suspect 3rd spacing.  DVT negative on ultrasound.  Stool is more formed today.  Continues to be off octreotide.  Transfuse 1 PRBC yesterday.  Started on EPO per Nephrology.  IV Lasix ordered.  Has good urine output.  Afebrile the past 24 hr.  Urine culture grew gram-negative estelle greater than 100,000.  Rocephin started.  Awaiting for LTAC placement.    3/17:Continued to be hypoxic saturating 88-90% on 4 l oxy   Overnight patient had acute urinary retention, Godwin changed.  Continued to have hematuria,with occasional clots, slowly Hb trending  "down    3/18:primarily worried about headache and neck pain  Also has loss of appetite,flexril added  3/19:pt desaturated to 78% on nc while trying to lye flat, HOB elevation with bipap improved saturation  Patient also noted to have AFib overnight placed on Cardizem drip, currently in sinus rhythm  Cardiology consult requested for possibly orthopnea causing hypoxia  Echo, unremarkable but BNP elevated  Started on Lasix drip  3/20:continued to request 6 L for oxygen   Procalcitonin elevated, hemoglobin 8.1, will hold on blood transfusion  BNP trending up  Cardiology and pulmonology follow-up appreciated  Will continue Lasix drip for 1 more day  Will extend antibiotic coverage with acithromycin for atypical pneumonia    3/21:Appears to be in very positive spirits, but continued to be in 5l oxy  Able to sleep well last night  Improved subjective dyspnea, not on bipap  PT eval appreciated, recommend SNF  Formed stool on ileostomy bag  F/u sx on ileostomy care and TPN on d/c planning      Interval History: .    Patient's feels better, saturating 96% on 4 L oxygen, no respiratory distress  Reports pain on her toungue, benzocaine gel added prn  TPN discontinued, has good appetite with foamed stool in the ileostomy bag.  Discharge plan discussed with surgeon Dr. Lovell, agreed to follow-up outpatient in 3 weeks further evaluation of her ileostomy reversal  Hb trending down will transfuse 1 u prbc and f/u urology for hematuria  IV lasix 20mg prn after transfusion     Review of Systems All other Review of Systems were found to be negative expect for that mentioned already in HPI.     Objective:   Physical Exam  BP (!) 111/57 (BP Location: Right arm)   Pulse 87   Temp 97.7 °F (36.5 °C) (Oral)   Resp 20   Ht 5' 7.5" (1.715 m)   Wt 59.3 kg (130 lb 11.7 oz)   SpO2 97%   Breastfeeding? No   BMI 20.17 kg/m²   Vitals reviewed.    Constitutional: No acute distress.   HENT: Atraumatic. PERRLA  Cardiovascular: Normal rate, " regular rhythm and normal heart sounds.   Pulmonary/Chest: Effort normal. She has no wheezes blt rales. S/p port   Abdominal: Soft. no tenderness,no distension,bowel sounds are normal  S/p ostomy ,lee with hematuria  Neurological: AOX3  Skin: Skin is warm and dry.     Following labs were Reviewed   Recent Labs   Lab 03/22/20  0342   WBC 10.15   HGB 7.0*   HCT 22.5*   *   CALCIUM 8.1*   ALBUMIN 2.0*   PROT 5.3*      K 4.0   CO2 27      BUN 36*   CREATININE 1.8*   ALKPHOS 211*   ALT 16   AST 28   BILITOT 1.1*     No results found for: POCTGLUCOSE     Microbiology Results (last 7 days)     Procedure Component Value Units Date/Time    Urine Culture High Risk [[id number]]  (Abnormal)  (Susceptibility) Collected:  03/15/20 0    Order Status:  Completed Specimen:  Urine, Catheterized Updated:  03/17/20 0632     Urine Culture, Routine KLEBSIELLA PNEUMONIAE  >100,000 cfu/ml      Narrative:       Indicated criteria for high risk culture:->Other  Other (specify):->sick        X-Ray Chest AP Portable   Final Result      CT Chest Without Contrast   Final Result      1. Diffuse bilateral abnormal interstitial prominence with multifocal bilateral ground-glass opacities, favored to reflect pulmonary edema.  Pneumonia is felt less likely, but not absolutely excluded.   2. Small left and small to moderate right pleural effusions.   3. Small pericardial effusion.   4. Dense coronary artery atherosclerotic calcification.   5. Ectasia of the thoracic aorta as discussed, stable.   6. Additional findings as above.         Electronically signed by: Dixon Thomas MD   Date:    03/19/2020   Time:    16:07      X-Ray Chest AP Portable   Final Result      No significant interval change in diffuse interstitial opacities, potentially reflecting acute congestive failure and or hypervolemia, viral or atypical pneumonia, and or developing ARDS.         Electronically signed by: Mike Martinez MD   Date:    03/18/2020    Time:    15:49      X-Ray Chest AP Portable   Final Result      Increasing diffuse bilateral lung opacity consistent with pulmonary edema, ARDS, or diffuse pneumonia         Electronically signed by: Maykel Schoifeld MD   Date:    03/16/2020   Time:    14:01      US Upper Extremity Veins Left   Final Result      Negative for left upper extremity deep venous thrombosis.         Electronically signed by: Mike Martinez MD   Date:    03/15/2020   Time:    10:59      X-Ray Chest AP Portable   Final Result      1. Slightly increasing airspace disease in the left upper lobe when compared to March 10, with slightly increasing small left pleural effusion.  Additional stable findings as described.  Findings could be on the basis of pulmonary edema or pneumonia.         Electronically signed by: Dixon Thomas MD   Date:    03/13/2020   Time:    15:08      X-Ray Chest AP Portable   Final Result      1.  Findings of mild pulmonary vascular congestion/edema with trace bilateral pleural effusions.      2.  Right-sided IJ central venous catheter, which takes a hairpin turn at its distal tip at the level of the SVC.         Electronically signed by: Rashi Wilder MD   Date:    03/10/2020   Time:    17:43          Inpatient medications  Scheduled Meds:   albuterol  2 puff Inhalation Q6H WAKE    azithromycin  500 mg Intravenous Q24H    diphenhydrAMINE  6.25 mg Intravenous Q6H    diphenoxylate-atropine 2.5-0.025 mg  1 tablet Oral QID    dronabinoL  2.5 mg Oral BID AC    enoxparin  30 mg Subcutaneous Q24H    epoetin nica-ebpx (RETACRIT) injection  100 Units/kg Subcutaneous Q7 Days    levothyroxine  88 mcg Oral Before breakfast    loperamide  4 mg Oral QID    magnesium oxide  400 mg Oral BID    pantoprazole  40 mg Intravenous BID    psyllium husk (with sugar)  2 packet Oral TID    sertraline  50 mg Oral Daily    sodium chloride  1 spray Each Nostril Daily    torsemide  20 mg Oral BID     Continuous Infusions:    dextrose 10 % in water (D10W)       PRN Meds:.sodium chloride, acetaminophen, butalbital-acetaminophen-caffeine -40 mg, calcium chloride IVPB, calcium chloride IVPB, calcium chloride IVPB, calcium chloride IVPB, calcium chloride IVPB, calcium chloride IVPB, cyclobenzaprine, dextrose 10 % in water (D10W), dextrose 50%, dextrose 50%, glucagon (human recombinant), glucose, glucose, HYDROcodone-acetaminophen, hyoscyamine, magnesium oxide, magnesium oxide, magnesium sulfate IVPB, magnesium sulfate IVPB, magnesium sulfate IVPB, magnesium sulfate IVPB, magnesium sulfate IVPB, magnesium sulfate IVPB, magnesium sulfate IVPB, magnesium sulfate IVPB, melatonin, ondansetron, potassium chloride in water, potassium chloride in water, potassium chloride in water, potassium chloride in water, potassium chloride in water, potassium chloride in water, potassium chloride in water, potassium chloride in water, potassium chloride, potassium chloride, potassium chloride, potassium chloride, potassium chloride, potassium chloride, potassium chloride, potassium chloride, sodium chloride 0.9%, sodium phosphate IVPB, sodium phosphate IVPB, sodium phosphate IVPB, sodium phosphate IVPB, sodium phosphate IVPB, sodium phosphate IVPB, sodium phosphate IVPB, sodium phosphate IVPB, sodium phosphate IVPB, sodium phosphate IVPB      Assessment & Plan:   Shara Hutchins is a 69 y.o. female admitted for    Malnutrition  Continue TPN until ostomy output decreased  Trial of discontinue octreotide drip  On TPN for nutrition support with suspected short gut syndrome  Will need reversal of ostomy with General surgery in several weeks  Awaiting for SNF placement  PT/OT  Resolved  thrombocytopenia  S/p 1 PRBC. On epo    Urinary retension with UTI  UCx -Klebsilla on rocephin  On lee  Urology eval appreciated    DARCI/CKD III  Renal function improved  Monitor urine output  Nephrology on board    Unstageable pressure injury of skin and tissue  Wound care  ordered    Anemia of chronic disease  Transfuse 1 unit of prbc's  ADAM weekly 100 units/kg SQ.                     Core measures:  -DNR  - DVT PPx: bilateral SCDs   - lines/ tubes: iliostomy, Godwins cath       Discharge Planning:   Awaiting for LTAC placement  .       Above encounter included review of the medical records, interviewing and examining the patient face-to-face, discussion with family and other health care providers, ordering and interpreting lab/test results and formulating a plan of care.     Medical Decision Making:      [] Low Complexity  [x] Moderate Complexity  [] High Complexity

## 2020-03-22 NOTE — CONSULTS
INPATIENT NEPHROLOGY CONSULT   [facility] NEPHROLOGY    Patient Name: Shara Hutchins  Date: 03/22/2020    Reason for consultation: DARCI/CKD    Chief Complaint: Diarrhea    History of Present Illness:  68 y/o F with hx of HTN, NIDDM-2, CKD-3, vulvar cancer (s/p chemo/radiation), and now s/p ileostomy after pneumatosis intestinalis earlier in 2020 and with persistent diarrhea 2/2 chemotherapy who p/w high ostomy output, treated with TPN/octreotide, awaiting LTAC, developed gross hematuria 3/13, noted to have bump in Cr and Hb drop, consulted for DARCI and anemia.    3/17 VSS, no new complains.  3/18 VSS, no new complains. UO 3L, ostomy 900, not sure what blood output 1,800ml is...  3/19 VSS, no new complains. UO 3.4L, ostomy 1L.  3/20 VSS, no new complains. UO 7L, ostomy 1.8L.  3/21 VSS, no new complains. UO 5L, ostomy 2L. On TPN.  3/22 VSS, no new complains. UO 3.6L, ostomy 2.7L. On TPN 1.1L. Renal will sign off, call with questions.    Plan of Care:    1. DARCI/CKD III  - sCr seem better, baseline is 1.6  - no nsaids or IV contrast    2. Chronic volume depletion 2/2 diarrhea  - monitor ostomy output  - on TPN  - on multiple stool bulking agents, loperamide; surgery in future    3. Gross hematuria  - persistent, plan per urology. Most likely cause for the persistent anemia.    4. Acute blood loss anemia on chronic anemia of CKD and chronic illness  - transfused 1 unit of blood on 3/15.  - ADAM weekly 100 units/kg SQ.  - will defer further blood transfusions to primary team and Urology.    5. Edema  - Apprecaite cards input.    Renal will sign off, call with questions.    Thank you for allowing us to participate in this patient's care. We will continue to follow.    Vital Signs:  Temp Readings from Last 3 Encounters:   03/22/20 98.5 °F (36.9 °C) (Oral)   03/07/20 98.2 °F (36.8 °C) (Oral)   03/05/20 97.8 °F (36.6 °C) (Oral)       Pulse Readings from Last 3 Encounters:   03/22/20 101   03/07/20 (!) 57   03/05/20 61       BP  Readings from Last 3 Encounters:   03/22/20 125/66   03/07/20 131/61   03/05/20 (!) 169/74       Weight:  Wt Readings from Last 3 Encounters:   03/21/20 59.3 kg (130 lb 11.7 oz)   03/06/20 53 kg (116 lb 13.5 oz)   03/07/20 52.6 kg (116 lb)       Past Medical & Surgical History:  Past Medical History:   Diagnosis Date    Chronic kidney disease, stage III (moderate) 1/7/2018    Diabetes mellitus, type 2     Fatigue     Hypertension     Iron deficiency anemia due to chronic blood loss 1/7/2018    Vulvar cancer 1/7/2019       Past Surgical History:   Procedure Laterality Date    ABDOMINAL SURGERY      BACK SURGERY      carpel tunnel surgery once on each hand      CERVICAL FUSION      x 4    cervix repair      CHOLECYSTECTOMY  2000    DIAGNOSTIC LAPAROSCOPY N/A 2/13/2020    Procedure: LAPAROSCOPY, DIAGNOSTIC;  Surgeon: Robbi Lovell III, MD;  Location: [facility];  Service: General;  Laterality: N/A;    HYSTERECTOMY  1980    ILEOSTOMY N/A 2/13/2020    Procedure: CREATION, ILEOSTOMY Loop;  Surgeon: Robbi Lovell III, MD;  Location: [facility] OR;  Service: General;  Laterality: N/A;    LUMBAR LAMINECTOMY      LYSIS OF ADHESIONS N/A 2/13/2020    Procedure: LYSIS, ADHESIONS;  Surgeon: Robbi Lovell III, MD;  Location: [facility] OR;  Service: General;  Laterality: N/A;    NECK SURGERY      PHLEBOGRAPHY  2/28/2020    Procedure: VENOGRAM;  Surgeon: Robbi Lovell III, MD;  Location: [facility];  Service: General;;    REMOVAL OF VASCULAR ACCESS PORT Right 2/13/2020    Procedure: REMOVAL, VASCULAR ACCESS PORT;  Surgeon: Robbi Lovell III, MD;  Location: [facility];  Service: General;  Laterality: Right;    SHOULDER SURGERY      vulva cancer         Past Social History:  Social History     Socioeconomic History    Marital status:      Spouse name: Not on file    Number of children: Not on file    Years of education: Not on file    Highest education level: Not on file   Occupational History     Not on file   Social Needs    Financial resource strain: Not on file    Food insecurity:     Worry: Not on file     Inability: Not on file    Transportation needs:     Medical: Not on file     Non-medical: Not on file   Tobacco Use    Smoking status: Former Smoker     Packs/day: 1.00     Years: 33.00     Pack years: 33.00     Types: Cigarettes     Last attempt to quit: 2000     Years since quittin.6    Smokeless tobacco: Never Used   Substance and Sexual Activity    Alcohol use: No    Drug use: No    Sexual activity: Not on file   Lifestyle    Physical activity:     Days per week: Not on file     Minutes per session: Not on file    Stress: Not on file   Relationships    Social connections:     Talks on phone: Not on file     Gets together: Not on file     Attends Confucianism service: Not on file     Active member of club or organization: Not on file     Attends meetings of clubs or organizations: Not on file     Relationship status: Not on file   Other Topics Concern    Not on file   Social History Narrative    Not on file       Medications:  Scheduled Meds:   albuterol  2 puff Inhalation Q6H WAKE    azithromycin  500 mg Intravenous Q24H    diphenhydrAMINE  6.25 mg Intravenous Q6H    diphenoxylate-atropine 2.5-0.025 mg  1 tablet Oral QID    dronabinoL  2.5 mg Oral BID AC    enoxparin  30 mg Subcutaneous Q24H    epoetin nica-ebpx (RETACRIT) injection  100 Units/kg Subcutaneous Q7 Days    levothyroxine  88 mcg Oral Before breakfast    loperamide  4 mg Oral QID    magnesium oxide  400 mg Oral BID    pantoprazole  40 mg Intravenous BID    psyllium husk (with sugar)  2 packet Oral TID    sertraline  50 mg Oral Daily    sodium chloride  1 spray Each Nostril Daily    torsemide  20 mg Oral BID     Continuous Infusions:   dextrose 10 % in water (D10W)      TPN ADULT CENTRAL LINE CUSTOM (3 in 1) 57 mL/hr at 20 1616     PRN Meds:.sodium chloride, acetaminophen,  butalbital-acetaminophen-caffeine -40 mg, calcium chloride IVPB, calcium chloride IVPB, calcium chloride IVPB, calcium chloride IVPB, calcium chloride IVPB, calcium chloride IVPB, cyclobenzaprine, dextrose 10 % in water (D10W), dextrose 50%, dextrose 50%, glucagon (human recombinant), glucose, glucose, HYDROcodone-acetaminophen, hyoscyamine, magnesium oxide, magnesium oxide, magnesium sulfate IVPB, magnesium sulfate IVPB, magnesium sulfate IVPB, magnesium sulfate IVPB, magnesium sulfate IVPB, magnesium sulfate IVPB, magnesium sulfate IVPB, magnesium sulfate IVPB, melatonin, ondansetron, potassium chloride in water, potassium chloride in water, potassium chloride in water, potassium chloride in water, potassium chloride in water, potassium chloride in water, potassium chloride in water, potassium chloride in water, potassium chloride, potassium chloride, potassium chloride, potassium chloride, potassium chloride, potassium chloride, potassium chloride, potassium chloride, sodium chloride 0.9%, sodium phosphate IVPB, sodium phosphate IVPB, sodium phosphate IVPB, sodium phosphate IVPB, sodium phosphate IVPB, sodium phosphate IVPB, sodium phosphate IVPB, sodium phosphate IVPB, sodium phosphate IVPB, sodium phosphate IVPB  No current facility-administered medications on file prior to encounter.      Current Outpatient Medications on File Prior to Encounter   Medication Sig Dispense Refill    acetaminophen (TYLENOL) 325 MG tablet Take 2 tablets (650 mg total) by mouth every 4 (four) hours as needed.  0    cholestyramine-aspartame (QUESTRAN LIGHT) 4 gram PwPk Take 1 packet (4 g total) by mouth 2 (two) times daily. 180 packet 3    dextrose 50% injection Inject 25 mLs (12.5 g total) into the vein as needed (between 51 - 70 mg/dL if patient cannnot take PO but has IV access.).      dextrose 50% injection Inject 50 mLs (25 g total) into the vein as needed (less than 50 mg/dL if patient cannnot take PO but has IV  "access.).      diphenhydrAMINE (BENADRYL) 50 mg/mL injection Inject 0.125 mLs (6.25 mg total) into the vein every 6 (six) hours.      glucose 4 GM chewable tablet Take 4 tablets (16 g total) by mouth as needed.  12    glucose 4 GM chewable tablet Take 6 tablets (24 g total) by mouth as needed.  12    HYDROcodone-acetaminophen (NORCO) 5-325 mg per tablet Take 1 tablet by mouth every 6 (six) hours as needed.  0    levothyroxine (SYNTHROID) 88 MCG tablet Take 1 tablet (88 mcg total) by mouth before breakfast. 30 tablet 11    melatonin (MELATIN) 3 mg tablet Take 3 tablets (9 mg total) by mouth nightly as needed for Insomnia.  0    ondansetron (ZOFRAN-ODT) 8 MG TbDL Take 1 tablet (8 mg total) by mouth every 8 (eight) hours as needed.      oxyCODONE-acetaminophen (PERCOCET) 5-325 mg per tablet Take 1 tablet by mouth every 4 (four) hours as needed.  0    pantoprazole (PROTONIX) 40 mg injection Inject 40 mg into the vein 2 (two) times daily. 2400 mg 11    psyllium husk, with sugar, 3.4 gram packet Take 2 packets by mouth 3 (three) times daily.      Ringer's solution,lactated (LACTATED RINGERS) infusion Inject 125 mL/hr into the vein continuous.         Allergies:  Ciprofloxacin and Pcn [penicillins]    Past Family History:  Reviewed; refer to Hospitalist Admission Note    Review of Systems:  Review of Systems - All 14 systems reviewed and negative, except as noted in HPI    Physical Exam:  /66 (BP Location: Right arm)   Pulse 101   Temp 98.5 °F (36.9 °C) (Oral)   Resp 20   Ht 5' 7.5" (1.715 m)   Wt 59.3 kg (130 lb 11.7 oz)   SpO2 (!) 93%   Breastfeeding? No   BMI 20.17 kg/m²     INS/OUTS:  I/O last 3 completed shifts:  In: 3099.3 [P.O.:850; I.V.:86.8; Other:60; IV Piggyback:250]  Out: 8700 [Urine:5400; Stool:3300]  No intake/output data recorded.    General Appearance:    NAD, AAO x 3, cooperative, appears stated age   Head:    Normocephalic, atraumatic   Eyes:    PER, EOMI, and conjunctiva/sclera " clear bilaterally        Mouth:   Moist mucus membranes, no thrush or oral lesions, normal      dentition   Back:     No CVA tenderness   Lungs:     Clear to auscultation bilaterally, no wheeze, crackles, rales      or rhonchi, symmetric air movement, respirations unlabored   Heart:    Regular rate and rhythm, S1 and S2 normal, no murmur, rub   or gallop   Abdomen:     Soft, non-tender, non-distended, bowel sounds active all four   quadrants, no RT or guarding, no masses, no organomegaly   Extremities:   Warm and well perfused, distal pulses intact, no cyanosis or    peripheral edema   MSK:   No joint or muscle swelling, tenderness or deformity   Skin:   Skin color, texture, turgor normal, no rashes or lesions   Neurologic/Psychiatric:   CNII-XII intact, normal strength and sensation       throughout, no asterixis; normal affect, memory, judgement     and insight     Results:  Lab Results   Component Value Date     03/22/2020    K 4.0 03/22/2020     03/22/2020    CO2 27 03/22/2020    BUN 36 (H) 03/22/2020    CREATININE 1.8 (H) 03/22/2020    CALCIUM 8.1 (L) 03/22/2020    ANIONGAP 8 03/22/2020    ESTGFRAFRICA 32.6 (A) 03/22/2020    EGFRNONAA 28.3 (A) 03/22/2020       Lab Results   Component Value Date    CALCIUM 8.1 (L) 03/22/2020    PHOS 3.6 03/22/2020       Recent Labs   Lab 03/22/20  0342   WBC 10.15   RBC 2.32*   HGB 7.0*   HCT 22.5*   *   MCV 97   MCH 30.2   MCHC 31.1*       I have personally reviewed pertinent radiological imaging and reports.    Jorge Webb MD  [facility]  [address] [place] [address]  [phone] (p)  [phone] (f)

## 2020-03-22 NOTE — CARE UPDATE
03/22/20 0805   Patient Assessment/Suction   Level of Consciousness (AVPU) alert   Respiratory Effort Normal;Unlabored   Expansion/Accessory Muscles/Retractions no use of accessory muscles   All Lung Fields Breath Sounds clear   PRE-TX-O2   O2 Device (Oxygen Therapy) nasal cannula w/ humidification   $ Is the patient on Low Flow Oxygen? Yes   Flow (L/min) 4   SpO2 (!) 93 %   Pulse Oximetry Type Continuous   $ Pulse Oximetry - Multiple Charge Pulse Oximetry - Multiple   Pulse 101   Resp 20   Inhaler   $ Inhaler Charges MDI (Metered Dose Inahler) Treatment;Given With Spacer;Independent   Daily Review of Necessity (Inhaler) completed   Respiratory Treatment Status (Inhaler) given;independent   Treatment Route (Inhaler) spacer/holding chamber;mouthpiece   Patient Position (Inhaler) HOB elevated   Post Treatment Assessment (Inhaler) breath sounds unchanged   Signs of Intolerance (Inhaler) none

## 2020-03-23 LAB
ALBUMIN SERPL BCP-MCNC: 2.1 G/DL (ref 3.5–5.2)
ALP SERPL-CCNC: 301 U/L (ref 55–135)
ALT SERPL W/O P-5'-P-CCNC: 19 U/L (ref 10–44)
ANION GAP SERPL CALC-SCNC: 5 MMOL/L (ref 8–16)
AST SERPL-CCNC: 40 U/L (ref 10–40)
BILIRUB SERPL-MCNC: 1.2 MG/DL (ref 0.1–1)
BUN SERPL-MCNC: 36 MG/DL (ref 8–23)
CALCIUM SERPL-MCNC: 8.6 MG/DL (ref 8.7–10.5)
CHLORIDE SERPL-SCNC: 103 MMOL/L (ref 95–110)
CO2 SERPL-SCNC: 28 MMOL/L (ref 23–29)
CREAT SERPL-MCNC: 1.8 MG/DL (ref 0.5–1.4)
ERYTHROCYTE [DISTWIDTH] IN BLOOD BY AUTOMATED COUNT: 17.7 % (ref 11.5–14.5)
EST. GFR  (AFRICAN AMERICAN): 32.6 ML/MIN/1.73 M^2
EST. GFR  (NON AFRICAN AMERICAN): 28.3 ML/MIN/1.73 M^2
GLUCOSE SERPL-MCNC: 79 MG/DL (ref 70–110)
GLUCOSE SERPL-MCNC: 92 MG/DL (ref 70–110)
HCT VFR BLD AUTO: 26.5 % (ref 37–48.5)
HGB BLD-MCNC: 8.5 G/DL (ref 12–16)
MAGNESIUM SERPL-MCNC: 1.9 MG/DL (ref 1.6–2.6)
MCH RBC QN AUTO: 30.1 PG (ref 27–31)
MCHC RBC AUTO-ENTMCNC: 32.1 G/DL (ref 32–36)
MCV RBC AUTO: 94 FL (ref 82–98)
PHOSPHATE SERPL-MCNC: 4.9 MG/DL (ref 2.7–4.5)
PLATELET # BLD AUTO: 456 K/UL (ref 150–350)
PMV BLD AUTO: 10.8 FL (ref 9.2–12.9)
POTASSIUM SERPL-SCNC: 4.8 MMOL/L (ref 3.5–5.1)
PREALB SERPL-MCNC: 11 MG/DL (ref 20–43)
PROT SERPL-MCNC: 5.5 G/DL (ref 6–8.4)
RBC # BLD AUTO: 2.82 M/UL (ref 4–5.4)
SODIUM SERPL-SCNC: 136 MMOL/L (ref 136–145)
TRIGL SERPL-MCNC: 105 MG/DL (ref 30–150)
WBC # BLD AUTO: 10.42 K/UL (ref 3.9–12.7)

## 2020-03-23 PROCEDURE — 84134 ASSAY OF PREALBUMIN: CPT

## 2020-03-23 PROCEDURE — 25000003 PHARM REV CODE 250: Performed by: FAMILY MEDICINE

## 2020-03-23 PROCEDURE — 25000003 PHARM REV CODE 250: Performed by: INTERNAL MEDICINE

## 2020-03-23 PROCEDURE — C9113 INJ PANTOPRAZOLE SODIUM, VIA: HCPCS | Performed by: INTERNAL MEDICINE

## 2020-03-23 PROCEDURE — 94761 N-INVAS EAR/PLS OXIMETRY MLT: CPT

## 2020-03-23 PROCEDURE — 97535 SELF CARE MNGMENT TRAINING: CPT

## 2020-03-23 PROCEDURE — 63600175 PHARM REV CODE 636 W HCPCS: Performed by: INTERNAL MEDICINE

## 2020-03-23 PROCEDURE — 27000221 HC OXYGEN, UP TO 24 HOURS

## 2020-03-23 PROCEDURE — 83735 ASSAY OF MAGNESIUM: CPT

## 2020-03-23 PROCEDURE — 85027 COMPLETE CBC AUTOMATED: CPT

## 2020-03-23 PROCEDURE — 97116 GAIT TRAINING THERAPY: CPT

## 2020-03-23 PROCEDURE — 94640 AIRWAY INHALATION TREATMENT: CPT

## 2020-03-23 PROCEDURE — 97530 THERAPEUTIC ACTIVITIES: CPT

## 2020-03-23 PROCEDURE — 84100 ASSAY OF PHOSPHORUS: CPT

## 2020-03-23 PROCEDURE — 84478 ASSAY OF TRIGLYCERIDES: CPT

## 2020-03-23 PROCEDURE — 99232 PR SUBSEQUENT HOSPITAL CARE,LEVL II: ICD-10-PCS | Mod: ,,, | Performed by: INTERNAL MEDICINE

## 2020-03-23 PROCEDURE — 25000003 PHARM REV CODE 250: Performed by: STUDENT IN AN ORGANIZED HEALTH CARE EDUCATION/TRAINING PROGRAM

## 2020-03-23 PROCEDURE — 99232 SBSQ HOSP IP/OBS MODERATE 35: CPT | Mod: ,,, | Performed by: INTERNAL MEDICINE

## 2020-03-23 PROCEDURE — 21400001 HC TELEMETRY ROOM

## 2020-03-23 PROCEDURE — 80053 COMPREHEN METABOLIC PANEL: CPT

## 2020-03-23 RX ORDER — CLOTRIMAZOLE 10 MG/1
10 LOZENGE ORAL; TOPICAL 4 TIMES DAILY
Status: DISCONTINUED | OUTPATIENT
Start: 2020-03-23 | End: 2020-03-27 | Stop reason: HOSPADM

## 2020-03-23 RX ORDER — FLUCONAZOLE 100 MG/1
100 TABLET ORAL ONCE
Status: COMPLETED | OUTPATIENT
Start: 2020-03-23 | End: 2020-03-23

## 2020-03-23 RX ADMIN — DIPHENHYDRAMINE HYDROCHLORIDE 6.25 MG: 50 INJECTION INTRAMUSCULAR; INTRAVENOUS at 06:03

## 2020-03-23 RX ADMIN — ALBUTEROL SULFATE 2 PUFF: 90 AEROSOL, METERED RESPIRATORY (INHALATION) at 01:03

## 2020-03-23 RX ADMIN — ENOXAPARIN SODIUM 30 MG: 100 INJECTION SUBCUTANEOUS at 04:03

## 2020-03-23 RX ADMIN — DIPHENOXYLATE HYDROCHLORIDE AND ATROPINE SULFATE 1 TABLET: 2.5; .025 TABLET ORAL at 09:03

## 2020-03-23 RX ADMIN — CYCLOBENZAPRINE HYDROCHLORIDE 5 MG: 5 TABLET, FILM COATED ORAL at 07:03

## 2020-03-23 RX ADMIN — PANTOPRAZOLE SODIUM 40 MG: 40 INJECTION, POWDER, LYOPHILIZED, FOR SOLUTION INTRAVENOUS at 09:03

## 2020-03-23 RX ADMIN — BUTALBITAL, ACETAMINOPHEN AND CAFFEINE 1 TABLET: 50; 325; 40 TABLET ORAL at 10:03

## 2020-03-23 RX ADMIN — LEVOTHYROXINE SODIUM 88 MCG: 88 TABLET ORAL at 05:03

## 2020-03-23 RX ADMIN — TORSEMIDE 20 MG: 20 TABLET ORAL at 06:03

## 2020-03-23 RX ADMIN — PSYLLIUM HUSK 2 PACKET: 3.4 POWDER ORAL at 09:03

## 2020-03-23 RX ADMIN — DRONABINOL 2.5 MG: 2.5 CAPSULE ORAL at 04:03

## 2020-03-23 RX ADMIN — LOPERAMIDE HYDROCHLORIDE 4 MG: 2 CAPSULE ORAL at 09:03

## 2020-03-23 RX ADMIN — PSYLLIUM HUSK 2 PACKET: 3.4 POWDER ORAL at 02:03

## 2020-03-23 RX ADMIN — SALINE NASAL SPRAY 1 SPRAY: 1.5 SOLUTION NASAL at 09:03

## 2020-03-23 RX ADMIN — DRONABINOL 2.5 MG: 2.5 CAPSULE ORAL at 05:03

## 2020-03-23 RX ADMIN — DIPHENHYDRAMINE HYDROCHLORIDE 6.25 MG: 50 INJECTION INTRAMUSCULAR; INTRAVENOUS at 12:03

## 2020-03-23 RX ADMIN — AZITHROMYCIN MONOHYDRATE 500 MG: 500 INJECTION, POWDER, LYOPHILIZED, FOR SOLUTION INTRAVENOUS at 07:03

## 2020-03-23 RX ADMIN — MAGNESIUM OXIDE 400 MG: 400 TABLET ORAL at 09:03

## 2020-03-23 RX ADMIN — DIPHENOXYLATE HYDROCHLORIDE AND ATROPINE SULFATE 1 TABLET: 2.5; .025 TABLET ORAL at 12:03

## 2020-03-23 RX ADMIN — DIPHENOXYLATE HYDROCHLORIDE AND ATROPINE SULFATE 1 TABLET: 2.5; .025 TABLET ORAL at 04:03

## 2020-03-23 RX ADMIN — HYOSCYAMINE SULFATE 0.12 MG: 0.12 TABLET ORAL at 02:03

## 2020-03-23 RX ADMIN — CLOTRIMAZOLE 10 MG: 10 LOZENGE ORAL; TOPICAL at 04:03

## 2020-03-23 RX ADMIN — ALBUTEROL SULFATE 2 PUFF: 90 AEROSOL, METERED RESPIRATORY (INHALATION) at 07:03

## 2020-03-23 RX ADMIN — TORSEMIDE 20 MG: 20 TABLET ORAL at 09:03

## 2020-03-23 RX ADMIN — DIPHENHYDRAMINE HYDROCHLORIDE 6.25 MG: 50 INJECTION INTRAMUSCULAR; INTRAVENOUS at 05:03

## 2020-03-23 RX ADMIN — LOPERAMIDE HYDROCHLORIDE 4 MG: 2 CAPSULE ORAL at 12:03

## 2020-03-23 RX ADMIN — SERTRALINE HYDROCHLORIDE 50 MG: 50 TABLET ORAL at 09:03

## 2020-03-23 RX ADMIN — EPOETIN ALFA-EPBX 6400 UNITS: 10000 INJECTION, SOLUTION INTRAVENOUS; SUBCUTANEOUS at 12:03

## 2020-03-23 RX ADMIN — CYCLOBENZAPRINE HYDROCHLORIDE 5 MG: 5 TABLET, FILM COATED ORAL at 02:03

## 2020-03-23 RX ADMIN — LOPERAMIDE HYDROCHLORIDE 4 MG: 2 CAPSULE ORAL at 04:03

## 2020-03-23 RX ADMIN — CLOTRIMAZOLE 10 MG: 10 LOZENGE ORAL; TOPICAL at 09:03

## 2020-03-23 RX ADMIN — FLUCONAZOLE 100 MG: 100 TABLET ORAL at 04:03

## 2020-03-23 NOTE — PROGRESS NOTES
Patient doing much better with ostomy output after starting Imodium and lomotil. She has been able to eat a normal diet with increase in output about thirty minutes after meals but output has been manageable between meals. Her TPN was stopped yesterday. Her albumin is up to 2. Her Cr. Is much improved at 1.7. Her urine is blood tinged which started about 5 days ago. She states she is feeling well. The hematuria is currently being worked up.     -If she is able to maintain her nutrition without any malabsorption issues, will wait the full 12 weeks before reversal verses no reversal at all. If she starts to have signs of malabsorption with protein malnutrition and electrolyte abnormalities, I think reversing the loop ileostomy would be indicated because it will re-introduce about 20 to 30 cm's of small bowel back into the equation for absorption. If she is having trouble with IVVD and DARCI due to ostomy output, would probably also benefit her to reverse as well.

## 2020-03-23 NOTE — PLAN OF CARE
"   03/23/20 0929   Discharge Reassessment   Assessment Type Discharge Planning Reassessment   Discharge Plan A Skilled Nursing Facility   Discharge Plan B Long-term acute care facility (LTAC)     SW following up on SNF vs. LTAC placement this date. Pt is now off TPN, so SNF placement may be possible.    Pt has been denied by the following SNFs:  [facility] (unable to meet medical needs), [facility] (unable to meet medical needs), and [facility] (don't take her insurance) [facility] (unable to meet medical needs, [facility] (unable to meet medical needs), and [facility]  (as of this morning they are accepting no new pts due to COVID-19). SW will send additional referrals.     [facility] has been willing to accept pt. TBD whether pt is now LTAC appropriate given she is off TPN.    9:39 Referral sent again to [facility] and new referrals to [facility] and [facility] in [facility].    9:45 SW also resent referral to Nadira from [facility], who stated she would take a second look since pt is now off TPN.    11:05 SW followed up with pt regarding placement. Pt states she is feeling much better and is now only on 2L of oxygen. Pt states she no longer is open to placement in [facility] due to COVID concerns. Pt is open to additional referrals being sent on the [place]. SW sent new referrals to [facility] in [place], [facility] [place], [facility], [facility], [facility] in [place], and [facility].    11:30 SW received a call from Laura at [facility] in [place], who stated they are currently full and unable to accept pt.    13:10 [facility] in [place] is not in network with pt's insurance. [facility] has decided [facility] is too "medically sick" and has denied again.    14:45 [facility] is unable to meet pt's medical needs.    15:07 SW left a message to follow up " with Garfield Sarkar at [facility] requested SW resend to [phone]. Referral re-sent via right fax. [facility] currently has no beds available per Nadira. [facility] does not take [facility].     15:29 [place] spoke with Basia from [facility] ([phone]), who assisted this SW in finding online provider directory. Basia stated she didn't think any [place] facilities would be in-network. [facility] in [place] (F: [phone]) and [facility] in [place] (F: [phone]) are additional options. SW faxed referrals.

## 2020-03-23 NOTE — PHYSICIAN QUERY
PT Name: Shara Hutchins  MR #: [id number]    Physician Query Form - Consultant Diagnosis Clarification     CDS/: Marti Busch               Contact information:  This form is a permanent document in the medical record.     Query Date: March 23, 2020      By submitting this query, we are merely seeking further clarification of documentation.  Please utilize your independent clinical judgment when addressing the question(s) below.      The Medical record contains the following:   Supporting Clinical Information Location in Medical Record   We are being consulted for possible CHF    She had diuresed well. She has put out over 5 liters.       Acute on chronic diastolic CHF probably worsened by anemia.   Dr Shine's consult 3/19    Dr Shine's PN 3/20        Dr Alicia's PN 3/21         Do you agree with the Consultants diagnosis of Acute on Chronic Diastolic CHF?    [  ] Yes, Acute on Chonic Diastolic CHF   [ x ] Yes, BUT ONLY Acute Diastolic CHF   [  ] No, DISAGREE with CHF   [  ] Other/Clarification of findings:   [  ] Clinically undetermined

## 2020-03-23 NOTE — PROGRESS NOTES
[facility]  Pulmonology  Progress Note    Patient Name: Shara Hutchins  MRN: [id number]  Admission Date: 3/7/2020  Hospital Length of Stay: 14 days  Code Status: DNR  Attending Provider: Digna Griffin MD  Primary Care Provider: April Horton MD   Principal Problem: Acute hypoxemic respiratory failure    Subjective:     Interval History:     3/19/2020 - Stable overnight, no new issues reported.  Weight is up 2 kg from admit but that weight is up about 10 kg from last hospital stay.  No new respiratory complaints, remains dyspneic and on NC O2.chest X-ray yesterday was unchanged.  I/O negative 2.2 liters.  Had atrial fibrillation last night and is back in NSR on cardizem.    3/20/2020 - Feels beter today, on lasix drip.  Weight decreased 2.6 kg, I/O negative 6.3 liters yesterday.  No new complaints.    3/21/2020 - Continnues to feel better, no neew complaints.  TPN has been stopped and she has started eating.  Is about 12 liters negative over the last 2 days    3/22/2020 - Stable overnight, no new complaints, she tells me that they are working on transfer to a rehab hospital. I/O 16.3 liters negative over last 3 days, no new weight today    3/23/2020 - stable overnight, continues to feel better , about 19-20 liters negative by I/O and visibly better.  Waiting on transfer to rehab.  O2 has decreased.    Review of Systems   Constitutional: Positive for malaise/fatigue and weight loss. Negative for chills, diaphoresis and fever.   HENT: Negative for congestion.    Eyes: Negative for pain.   Respiratory: Positive for cough and shortness of breath. Negative for hemoptysis, sputum production, wheezing and stridor.    Cardiovascular: Positive for orthopnea and leg swelling. Negative for chest pain, palpitations, claudication and PND.   Gastrointestinal: Positive for diarrhea. Negative for abdominal pain, constipation, heartburn, nausea and vomiting.        Ostomy   Genitourinary: Negative for dysuria,  frequency, hematuria and urgency.   Musculoskeletal: Negative for falls and myalgias.   Neurological: Positive for weakness (generalized). Negative for sensory change and focal weakness.   Psychiatric/Behavioral: Negative for depression and suicidal ideas. The patient is not nervous/anxious.          Objective:     Vital Signs (Most Recent):  Temp: 98.1 °F (36.7 °C) (03/23/20 1201)  Pulse: 86 (03/23/20 1352)  Resp: 17 (03/23/20 1352)  BP: (!) 119/58 (03/23/20 1201)  SpO2: 96 % (03/23/20 1352) Vital Signs (24h Range):  Temp:  [98 °F (36.7 °C)-98.4 °F (36.9 °C)] 98.1 °F (36.7 °C)  Pulse:  [86-97] 86  Resp:  [17-44] 17  SpO2:  [89 %-100 %] 96 %  BP: (111-128)/(58-69) 119/58     Weight: 58.1 kg (128 lb 1.4 oz)  Body mass index is 19.77 kg/m².      Intake/Output Summary (Last 24 hours) at 3/23/2020 1649  Last data filed at 3/23/2020 1400  Gross per 24 hour   Intake 3049 ml   Output 6100 ml   Net -3051 ml       Physical Exam   Constitutional: She is oriented to person, place, and time. She appears well-developed and well-nourished. No distress.   Chronically ill female, nad  Sitting in bed   HENT:   Head: Normocephalic and atraumatic.   Right Ear: External ear normal.   Left Ear: External ear normal.   Nose: Nose normal.   Mouth/Throat: Oropharynx is clear and moist.   Eyes: Pupils are equal, round, and reactive to light. Conjunctivae and EOM are normal.   Neck: Normal range of motion. Neck supple. No JVD present. No tracheal deviation present. No thyromegaly present.   Cardiovascular: Normal rate, regular rhythm, normal heart sounds and intact distal pulses. Exam reveals no gallop and no friction rub.   No murmur heard.  Pulmonary/Chest: Effort normal. No stridor. No respiratory distress. She has no wheezes. She has rales (decreased). She exhibits no tenderness.   Decreased at bases but better   Abdominal: Soft. Bowel sounds are normal. She exhibits no distension. There is no tenderness. There is no rebound and no  guarding.   + ostomy   Musculoskeletal: Normal range of motion. She exhibits edema (in dependent areas). She exhibits no tenderness.   Lymphadenopathy:     She has no cervical adenopathy.   Neurological: She is alert and oriented to person, place, and time. No cranial nerve deficit.   Skin: Skin is warm and dry. She is not diaphoretic.   Psychiatric: She has a normal mood and affect. Her behavior is normal.   Nursing note and vitals reviewed.      Vents:  Oxygen Concentration (%): 45 (03/20/20 0702)    Lines/Drains/Airways     Peripherally Inserted Central Catheter Line            PICC Double Lumen 03/06/20 1154 other (see comments) 17 days          Drain                 Hemodialysis AV Fistula Left upper arm -- days         Ileostomy 02/13/20 1600 Loop RLQ 38 days         Urethral Catheter 03/16/20 2230 Straight-tip 18 Fr. 6 days                Significant Labs:    CBC/Anemia Profile:  Recent Labs   Lab 03/22/20  0342 03/23/20  0449   WBC 10.15 10.42   HGB 7.0* 8.5*   HCT 22.5* 26.5*   * 456*   MCV 97 94   RDW 16.7* 17.7*        Chemistries:  Recent Labs   Lab 03/22/20  0342 03/23/20  0449    136   K 4.0 4.8    103   CO2 27 28   BUN 36* 36*   CREATININE 1.8* 1.8*   CALCIUM 8.1* 8.6*   ALBUMIN 2.0* 2.1*   PROT 5.3* 5.5*   BILITOT 1.1* 1.2*   ALKPHOS 211* 301*   ALT 16 19   AST 28 40   MG 2.2 1.9   PHOS 3.6 4.9*       All pertinent labs within the past 24 hours have been reviewed.    BNP - 1502    Microbiology Results (last 7 days)     Procedure Component Value Units Date/Time    Urine Culture High Risk [[id number]]  (Abnormal)  (Susceptibility) Collected:  03/15/20 2350    Order Status:  Completed Specimen:  Urine, Catheterized Updated:  03/17/20 0632     Urine Culture, Routine KLEBSIELLA PNEUMONIAE  >100,000 cfu/ml      Narrative:       Indicated criteria for high risk culture:->Other  Other (specify):->sick            Significant Imaging:  I have reviewed and interpreted all pertinent imaging  results/findings within the past 24 hours.     CXR (3/18) - about the same, bilateral infiltrates c/w fluid  CXR (3/20) - about the same    ECHO (3/19)  · Mild concentric left ventricular hypertrophy.  · Pulmonary hypertension present.  · Normal left ventricular systolic function. The estimated ejection fraction is 60%.  · Grade I (mild) left ventricular diastolic dysfunction consistent with impaired relaxation.  · No wall motion abnormalities.  · Normal right ventricular systolic function.  · Intermediate central venous pressure (8 mmHg).  · Trivial pericardial effusion.  · There is a left pleural effusion.  · The estimated PA systolic pressure is 47 mmHg.    Assessment/Plan:     * Acute hypoxemic respiratory failure  · Due to pulmonary edema and volume overload  · Continue diuresis - better  · Minimize fluids  · O2 to keep sats > 90%  · Prn albuterol  · Check weight daily  · Better, continue to decrease O2 as able    Pulmonary hypertension  · I suspect group 2 (diastolic dysfunction)  · Follow, no acute treatment needed    Malnutrition  · Fairly severe, follow  · TPN stopped and oral diet increased    Debility  · PT, OT as able    Unstageable pressure injury of skin and tissue  · Continue with local care    DNR (do not resuscitate)  · Aware     S/P ileostomy  · Aware     Chronic diarrhea  · S/p ostomy  · Pt reports that stools have more substance    Stage 5 chronic kidney disease not on chronic dialysis  · Aware     Iron deficiency anemia due to chronic blood loss  · Aware, has some minor blood in lee  · Transfuse for hgb < 7    Inflammatory bowel disease  · Aware            Moo Maharaj MD  Pulmonology  [facility]

## 2020-03-23 NOTE — PROGRESS NOTES
Interval History:  Patient denies having any shortness of breath.      Review of Systems     Denies Chest pain, sob, or palpitations  No recent fever, cough chills or congestion  No blood in the urine or stool  No myalgias  No recent arm, neck, or jaw pain  No lightheadedness or dizziness  No Double vision, blurry, vision or headache     Objective:     Vital Signs (Most Recent):  Temp: 98.1 °F (36.7 °C) (03/23/20 0717)  Pulse: 94 (03/23/20 0728)  Resp: 20 (03/23/20 0728)  BP: 111/64 (03/23/20 0717)  SpO2: 99 % (03/23/20 0728) Vital Signs (24h Range):  Temp:  [97.7 °F (36.5 °C)-98.7 °F (37.1 °C)] 98.1 °F (36.7 °C)  Pulse:  [84-98] 94  Resp:  [18-44] 20  SpO2:  [89 %-100 %] 99 %  BP: (107-128)/(57-69) 111/64     Weight: 58.1 kg (128 lb 1.4 oz)  Body mass index is 19.77 kg/m².     SpO2: 99 %  O2 Device (Oxygen Therapy): nasal cannula      Intake/Output Summary (Last 24 hours) at 3/23/2020 1030  Last data filed at 3/23/2020 0553  Gross per 24 hour   Intake 1692 ml   Output 5200 ml   Net -3508 ml       Lines/Drains/Airways     Peripherally Inserted Central Catheter Line            PICC Double Lumen 03/06/20 1154 other (see comments) 16 days          Drain                 Hemodialysis AV Fistula Left upper arm -- days         Ileostomy 02/13/20 1600 Loop RLQ 38 days         Urethral Catheter 03/16/20 2230 Straight-tip 18 Fr. 6 days                   albuterol  2 puff Inhalation Q6H WAKE    azithromycin  500 mg Intravenous Q24H    diphenhydrAMINE  6.25 mg Intravenous Q6H    diphenoxylate-atropine 2.5-0.025 mg  1 tablet Oral QID    dronabinoL  2.5 mg Oral BID AC    enoxparin  30 mg Subcutaneous Q24H    epoetin nica-ebpx (RETACRIT) injection  100 Units/kg Subcutaneous Q7 Days    levothyroxine  88 mcg Oral Before breakfast    loperamide  4 mg Oral QID    magnesium oxide  400 mg Oral BID    pantoprazole  40 mg Intravenous BID    psyllium husk (with sugar)  2 packet Oral TID    sertraline  50 mg Oral Daily     sodium chloride  1 spray Each Nostril Daily    torsemide  20 mg Oral BID         PHYSICAL EXAM:  Constitutional: Well built, well nourished in no apparent distress  Neck: no carotid bruit, no JVD  Lungs:  Much improved at this time no significant rales scattered rhonchi present   Chest Wall: no tenderness  Heart: regular rate and rhythm, S1, S2 normal, no murmur, click, rub or gallop   Abdomen: soft, non-tender; bowel sounds normal; no masses,  no organomegaly  Extremities: Extremities normal, no edema  Neuro: AAO X 3    I HAVE REVIEWED :    The vital signs, nurses notes, and all the pertinent radiology and labs.       Significant Labs: Results for UMAIR CHA (MRN [id number]) as of 3/23/2020 10:30   Ref. Range 3/20/2020 23:51 3/21/2020 04:10 3/22/2020 03:42 3/23/2020 04:49   WBC Latest Ref Range: 3.90 - 12.70 K/uL  11.26 10.15 10.42   RBC Latest Ref Range: 4.00 - 5.40 M/uL  2.53 (L) 2.32 (L) 2.82 (L)   Hemoglobin Latest Ref Range: 12.0 - 16.0 g/dL  7.7 (L) 7.0 (L) 8.5 (L)   Hematocrit Latest Ref Range: 37.0 - 48.5 %  24.3 (L) 22.5 (L) 26.5 (L)   MCV Latest Ref Range: 82 - 98 fL  96 97 94   MCH Latest Ref Range: 27.0 - 31.0 pg  30.4 30.2 30.1   MCHC Latest Ref Range: 32.0 - 36.0 g/dL  31.7 (L) 31.1 (L) 32.1   RDW Latest Ref Range: 11.5 - 14.5 %  16.6 (H) 16.7 (H) 17.7 (H)   Platelets Latest Ref Range: 150 - 350 K/uL  391 (H) 391 (H) 456 (H)   MPV Latest Ref Range: 9.2 - 12.9 fL  11.4 11.5 10.8   Sodium Latest Ref Range: 136 - 145 mmol/L  137 136 136   Potassium Latest Ref Range: 3.5 - 5.1 mmol/L  4.0 4.0 4.8   Chloride Latest Ref Range: 95 - 110 mmol/L  100 101 103   CO2 Latest Ref Range: 23 - 29 mmol/L  28 27 28   Anion Gap Latest Ref Range: 8 - 16 mmol/L  9 8 5 (L)   BUN, Bld Latest Ref Range: 8 - 23 mg/dL  35 (H) 36 (H) 36 (H)   Creatinine Latest Ref Range: 0.5 - 1.4 mg/dL  1.8 (H) 1.8 (H) 1.8 (H)   eGFR if non African American Latest Ref Range: >60 mL/min/1.73 m^2  28.3 (A) 28.3 (A) 28.3 (A)   eGFR if African  American Latest Ref Range: >60 mL/min/1.73 m^2  32.6 (A) 32.6 (A) 32.6 (A)   Glucose Latest Ref Range: 70 - 110 mg/dL  129 (H) 117 (H) 79   Calcium Latest Ref Range: 8.7 - 10.5 mg/dL  8.3 (L) 8.1 (L) 8.6 (L)   Phosphorus Latest Ref Range: 2.7 - 4.5 mg/dL  3.3 3.6 4.9 (H)   Magnesium Latest Ref Range: 1.6 - 2.6 mg/dL  2.2 2.2 1.9   Alkaline Phosphatase Latest Ref Range: 55 - 135 U/L  206 (H) 211 (H) 301 (H)   PROTEIN TOTAL Latest Ref Range: 6.0 - 8.4 g/dL  5.4 (L) 5.3 (L) 5.5 (L)   Albumin Latest Ref Range: 3.5 - 5.2 g/dL  2.0 (L) 2.0 (L) 2.1 (L)   Prealbumin Latest Ref Range: 20.0 - 43.0 mg/dL    11 (L)   BILIRUBIN TOTAL Latest Ref Range: 0.1 - 1.0 mg/dL  1.2 (H) 1.1 (H) 1.2 (H)   AST Latest Ref Range: 10 - 40 U/L  24 28 40   ALT Latest Ref Range: 10 - 44 U/L  13 16 19   Triglycerides Latest Ref Range: 30 - 150 mg/dL    105   POC Glucose Latest Ref Range: 70 - 110  137 (H)          Significant Imaging:               I HAVE REVIEWED :    The vital signs, nurses notes, and all the pertinent radiology and labs.       ASSESSMENT & PLAN:       1. SOB and Hypoxemia significantly improved  2. Elevated BNP clinically improved  3. CXR show hypervolemia atypical pneumonia or developing ARDs on yesterday  4. Anemia and hematuria  5. Klebsiella Pneumoniae to urine  6. DARCI on CKD Stage III  7. Unstageable pressure ulcer  8. Inflammatory Bowel Disease  9. Thyroid Dysfunction  10. S/P Ileostomy  11. H/O Vulvar Cancer   12. Hypokalemia  13. Low Grade Fever  14. Grade 1 Diastolic Dysfunction, Normal LVEF 60%  15. Mild-Moderate PH PA Pressure 47   16.  Acute decompensation of chronic congestive heart failure with diastolic dysfunction, ejection fraction of 60%  Seventeen.  Persistent hematuria  RECOMMENDATIONS:    1..  Continue on present therapy maintain on Demadex twice a day for next 3 days and then reduce it to once a day 20 mg.  2.  Continue to evaluate for oxygen requirements  3.  Hemoglobin is remained stable  4.  Consider  encouraging her for discharge planning to home if she can manage with some family support.  5.  Continue on supportive measures.         I have personally interviewed and examined the patient, I have reviewed the Nurse Practitioner's history and physical, assessment, and plan. I have personally evaluated the patient at bedside and agree with the findings.

## 2020-03-23 NOTE — PLAN OF CARE
Problem: Physical Therapy Goal  Goal: Physical Therapy Goal  Description  Goals to be met by: D/C    Patient will increase functional independence with mobility by performin. Supine to sit with Modified [place]  2. Sit to stand transfer with Stand-by Assistance  3. Gait  x 100 feet with Stand-by Assistance using Rolling Walker.      Outcome: Ongoing, Progressing

## 2020-03-23 NOTE — ASSESSMENT & PLAN NOTE
· Due to pulmonary edema and volume overload  · Continue diuresis - better  · Minimize fluids  · O2 to keep sats > 90%  · Prn albuterol  · Check weight daily  · Better, continue to decrease O2 as able

## 2020-03-23 NOTE — PT/OT/SLP PROGRESS
Occupational Therapy   Treatment    Name: Shara Hutchins  MRN: [id number]  Admitting Diagnosis:  Acute hypoxemic respiratory failure       Recommendations:     Discharge Recommendations: LTACH (long-term acute care hospital)  Discharge Equipment Recommendations:  walker, rolling  Barriers to discharge:  None    Assessment:     Shara Hutchins is a 69 y.o. female with a medical diagnosis of Acute hypoxemic respiratory failure.  She presents with improving medical acuity and functional mobility. Performed EOB ADL activity this session in decrease anxiety and increased endurance. Performance deficits affecting function are weakness, impaired endurance, impaired self care skills, impaired functional mobilty, impaired cardiopulmonary response to activity.     Rehab Prognosis:  Fair; patient would benefit from acute skilled OT services to address these deficits and reach maximum level of function.       Plan:     Patient to be seen 5 x/week to address the above listed problems via self-care/home management, therapeutic activities, therapeutic exercises  · Plan of Care Expires:    · Plan of Care Reviewed with: patient    Subjective     Pain/Comfort:  · Pain Rating 1: 0/10  · Pain Rating Post-Intervention 1: 0/10    Objective:     Communicated with: nurse prior to session.  Patient found HOB elevated with pulse ox (continuous), oxygen, telemetry, lee catheter upon OT entry to room.    General Precautions: Standard, fall   Orthopedic Precautions:N/A   Braces: N/A     Occupational Performance:     Bed Mobility:    · Patient completed Scooting/Bridging with stand by assistance  · Patient completed Supine to Sit with stand by assistance  · Patient completed Sit to Supine with stand by assistance   · Performed unsupported sitting EOB for 15 minutes with stand by assistance    Activities of Daily Living:  · Lower Body Dressing: stand by assistance to put lotion on BUE and BLE      AMPAC 6 Click ADL:      Treatment & Education:  Patient  demonstrated improved endurance this session. Patient reports ambulating to bathroom for a shower prior to this session.     Patient left HOB elevated with all lines intact and call button in reachEducation:      GOALS:   Multidisciplinary Problems     Occupational Therapy Goals        Problem: Occupational Therapy Goal    Goal Priority Disciplines Outcome Interventions   Occupational Therapy Goal     OT, PT/OT Ongoing, Progressing    Description:  Goals to be met by: discharge     Patient will increase functional independence with ADLs by performing:    UE Dressing with Set-up Assistance.  LE Dressing with Set-up Assistance.  Grooming while standing at sink with Supervision.  Upper extremity exercise program x20 reps per handout, with independence.                      Time Tracking:     OT Date of Treatment: 03/23/20  OT Start Time: 1015  OT Stop Time: 1038  OT Total Time (min): 23 min    Billable Minutes:Self Care/Home Management 23    Dashawn Sutherland OT  3/23/2020

## 2020-03-23 NOTE — PLAN OF CARE
03/23/20 1531   Discharge Reassessment   Assessment Type Discharge Planning Reassessment   Discharge Plan A Skilled Nursing Facility   Discharge Plan B Home Health   DME Needed Upon Discharge  walker, rolling  (if pt d/c home with home health)     LTAC was previously being pursued for pt but she is now on only 2 L of oxygen and no longer on TPN.    Pt has been denied by the following 11 SNFs so far: [facility] (unable to meet medical needs), [place] [facility] (unable to meet medical needs), [facility] (don't take her insurance), [facility] (unable to meet medical needs), [facility] (unable to meet medical needs), and [facility]  (as of this morning they are accepting no new pts due to COVID-19),[facility] in [place] (no bed availability), [facility] in [place] (out of network),   [facility] (unable to meet medical needs), [facility] (no bed availability), [facility] (out of network).    Pt still has pending referrals with [facility], [facility], [facility] in [place], and [facility] in [place]. Per Basia from [facility], [place] facilities would not be in network.    16:22 Per Nereyda, PT supervisor, pt would only be HH appropriate with 24 hour supervision. A review of pt's chart indicates she lives alone. [facility] followed up with [facility] from [facility] regarding whether or not pt would still be LTAC appropriate. Per [facility], auth should still be submitted with a focus on pt's malnutrition, wound care, respiratory management, and ileostomy management. [facility] faxed [facility] updated clinicals, and they will resubmit to [facility] for auth.

## 2020-03-23 NOTE — SUBJECTIVE & OBJECTIVE
Interval History:     3/19/2020 - Stable overnight, no new issues reported.  Weight is up 2 kg from admit but that weight is up about 10 kg from last hospital stay.  No new respiratory complaints, remains dyspneic and on NC O2.chest X-ray yesterday was unchanged.  I/O negative 2.2 liters.  Had atrial fibrillation last night and is back in NSR on cardizem.    3/20/2020 - Feels beter today, on lasix drip.  Weight decreased 2.6 kg, I/O negative 6.3 liters yesterday.  No new complaints.    3/21/2020 - Continnues to feel better, no neew complaints.  TPN has been stopped and she has started eating.  Is about 12 liters negative over the last 2 days    3/22/2020 - Stable overnight, no new complaints, she tells me that they are working on transfer to a rehab hospital. I/O 16.3 liters negative over last 3 days, no new weight today    3/23/2020 - stable overnight, continues to feel better , about 19-20 liters negative by I/O and visibly better.  Waiting on transfer to rehab.  O2 has decreased.    Review of Systems   Constitutional: Positive for malaise/fatigue and weight loss. Negative for chills, diaphoresis and fever.   HENT: Negative for congestion.    Eyes: Negative for pain.   Respiratory: Positive for cough and shortness of breath. Negative for hemoptysis, sputum production, wheezing and stridor.    Cardiovascular: Positive for orthopnea and leg swelling. Negative for chest pain, palpitations, claudication and PND.   Gastrointestinal: Positive for diarrhea. Negative for abdominal pain, constipation, heartburn, nausea and vomiting.        Ostomy   Genitourinary: Negative for dysuria, frequency, hematuria and urgency.   Musculoskeletal: Negative for falls and myalgias.   Neurological: Positive for weakness (generalized). Negative for sensory change and focal weakness.   Psychiatric/Behavioral: Negative for depression and suicidal ideas. The patient is not nervous/anxious.          Objective:     Vital Signs (Most  Recent):  Temp: 98.1 °F (36.7 °C) (03/23/20 1201)  Pulse: 86 (03/23/20 1352)  Resp: 17 (03/23/20 1352)  BP: (!) 119/58 (03/23/20 1201)  SpO2: 96 % (03/23/20 1352) Vital Signs (24h Range):  Temp:  [98 °F (36.7 °C)-98.4 °F (36.9 °C)] 98.1 °F (36.7 °C)  Pulse:  [86-97] 86  Resp:  [17-44] 17  SpO2:  [89 %-100 %] 96 %  BP: (111-128)/(58-69) 119/58     Weight: 58.1 kg (128 lb 1.4 oz)  Body mass index is 19.77 kg/m².      Intake/Output Summary (Last 24 hours) at 3/23/2020 1649  Last data filed at 3/23/2020 1400  Gross per 24 hour   Intake 3049 ml   Output 6100 ml   Net -3051 ml       Physical Exam   Constitutional: She is oriented to person, place, and time. She appears well-developed and well-nourished. No distress.   Chronically ill female, nad  Sitting in bed   HENT:   Head: Normocephalic and atraumatic.   Right Ear: External ear normal.   Left Ear: External ear normal.   Nose: Nose normal.   Mouth/Throat: Oropharynx is clear and moist.   Eyes: Pupils are equal, round, and reactive to light. Conjunctivae and EOM are normal.   Neck: Normal range of motion. Neck supple. No JVD present. No tracheal deviation present. No thyromegaly present.   Cardiovascular: Normal rate, regular rhythm, normal heart sounds and intact distal pulses. Exam reveals no gallop and no friction rub.   No murmur heard.  Pulmonary/Chest: Effort normal. No stridor. No respiratory distress. She has no wheezes. She has rales (decreased). She exhibits no tenderness.   Decreased at bases but better   Abdominal: Soft. Bowel sounds are normal. She exhibits no distension. There is no tenderness. There is no rebound and no guarding.   + ostomy   Musculoskeletal: Normal range of motion. She exhibits edema (in dependent areas). She exhibits no tenderness.   Lymphadenopathy:     She has no cervical adenopathy.   Neurological: She is alert and oriented to person, place, and time. No cranial nerve deficit.   Skin: Skin is warm and dry. She is not diaphoretic.    Psychiatric: She has a normal mood and affect. Her behavior is normal.   Nursing note and vitals reviewed.      Vents:  Oxygen Concentration (%): 45 (03/20/20 0702)    Lines/Drains/Airways     Peripherally Inserted Central Catheter Line            PICC Double Lumen 03/06/20 1154 other (see comments) 17 days          Drain                 Hemodialysis AV Fistula Left upper arm -- days         Ileostomy 02/13/20 1600 Loop RLQ 38 days         Urethral Catheter 03/16/20 2230 Straight-tip 18 Fr. 6 days                Significant Labs:    CBC/Anemia Profile:  Recent Labs   Lab 03/22/20  0342 03/23/20  0449   WBC 10.15 10.42   HGB 7.0* 8.5*   HCT 22.5* 26.5*   * 456*   MCV 97 94   RDW 16.7* 17.7*        Chemistries:  Recent Labs   Lab 03/22/20  0342 03/23/20  0449    136   K 4.0 4.8    103   CO2 27 28   BUN 36* 36*   CREATININE 1.8* 1.8*   CALCIUM 8.1* 8.6*   ALBUMIN 2.0* 2.1*   PROT 5.3* 5.5*   BILITOT 1.1* 1.2*   ALKPHOS 211* 301*   ALT 16 19   AST 28 40   MG 2.2 1.9   PHOS 3.6 4.9*       All pertinent labs within the past 24 hours have been reviewed.    BNP - 1502    Microbiology Results (last 7 days)     Procedure Component Value Units Date/Time    Urine Culture High Risk [[id number]]  (Abnormal)  (Susceptibility) Collected:  03/15/20 2350    Order Status:  Completed Specimen:  Urine, Catheterized Updated:  03/17/20 0632     Urine Culture, Routine KLEBSIELLA PNEUMONIAE  >100,000 cfu/ml      Narrative:       Indicated criteria for high risk culture:->Other  Other (specify):->sick            Significant Imaging:  I have reviewed and interpreted all pertinent imaging results/findings within the past 24 hours.     CXR (3/18) - about the same, bilateral infiltrates c/w fluid  CXR (3/20) - about the same    ECHO (3/19)  · Mild concentric left ventricular hypertrophy.  · Pulmonary hypertension present.  · Normal left ventricular systolic function. The estimated ejection fraction is 60%.  · Grade I (mild)  left ventricular diastolic dysfunction consistent with impaired relaxation.  · No wall motion abnormalities.  · Normal right ventricular systolic function.  · Intermediate central venous pressure (8 mmHg).  · Trivial pericardial effusion.  · There is a left pleural effusion.  · The estimated PA systolic pressure is 47 mmHg.

## 2020-03-23 NOTE — PROGRESS NOTES
[facility] Medicine Progress Note    Patient Name: Shara Hutchins MRN: [id number]   Patient Class: IP- Inpatient  Length of Stay: 14   Admission Date: 3/7/2020  8:42 PM Attending Physician: Digna Griffin MD   Primary Care Provider: April Horton MD Face-to-Face encounter date: 03/23/2020   Chief Complaint: No chief complaint on file.        Subjective:    Hospital course:  Patient was admitted for severe dehydration and malnutrition with 20 lb weight loss at home due to high output from the ileostomy.  Patient was started on TPN and IV fluids.  Patient continued to have high output to the ileostomy with multiple anti-diarrheal agents.  Patient was started on octreotide with improved diarrhea.  Patient's symptoms improved during hospitalization.  Patient was tolerating diet and had improved nutrition.  Octreotide was discontinued however patient started having watery diarrhea and increased output.  Discussed with surgery who has concerns for short gut syndrome.  Plan for reversal of ileostomy in several weeks.  Adjusted patient's antidiarrhea medication with improved output from the ostomy.  Discontinue octreotide.  Clinical course complicated by recurrent DARCI and third-spacing causing pitting edema.  3/16:  Has increased shortness of breath.  Requiring 2 L nasal cannula saturating 94%.  Continues to have pitting edema and upper and lower extremities.  Suspect 3rd spacing.  DVT negative on ultrasound.  Stool is more formed today.  Continues to be off octreotide.  Transfuse 1 PRBC yesterday.  Started on EPO per Nephrology.  IV Lasix ordered.  Has good urine output.  Afebrile the past 24 hr.  Urine culture grew gram-negative estelle greater than 100,000.  Rocephin started.  Awaiting for LTAC placement.    3/17:Continued to be hypoxic saturating 88-90% on 4 l oxy   Overnight patient had acute urinary retention, Godwin changed.  Continued to have hematuria,with occasional clots, slowly Hb trending  "down    3/18:primarily worried about headache and neck pain  Also has loss of appetite,flexril added  3/19:pt desaturated to 78% on nc while trying to lye flat, HOB elevation with bipap improved saturation  Patient also noted to have AFib overnight placed on Cardizem drip, currently in sinus rhythm  Cardiology consult requested for possibly orthopnea causing hypoxia  Echo, unremarkable but BNP elevated  Started on Lasix drip  3/20:continued to request 6 L for oxygen   Procalcitonin elevated, hemoglobin 8.1, will hold on blood transfusion  BNP trending up  Cardiology and pulmonology follow-up appreciated  Will continue Lasix drip for 1 more day  Will extend antibiotic coverage with acithromycin for atypical pneumonia    3/21:Appears to be in very positive spirits, but continued to be in 5l oxy  Able to sleep well last night  Improved subjective dyspnea, not on bipap  PT eval appreciated, recommend SNF  Formed stool on ileostomy bag  F/u sx on ileostomy care and TPN on d/c planning    3/22:saturating 96% on 4 L oxygen, no respiratory distress  Mucositis noted, benzocaine gel added prn   TPN discontinued, has good appetite with foamed stool in the ileostomy bag.  Discharge plan discussed with surgeon Dr. Lovell, agreed to follow-up outpatient in 3 weeks further evaluation of her ileostomy reversal  S/p 1 u PRBC    Interval History: .  Pt feels better on 2l nc, clear lungs on aucultation    f/u urology for hematuria  PT recommend SNF   Cardiology eval appreciated,  maintain on Demadex twice a day for next 3 days and then reduce it to once a day 20 mg  Will treat for candidiasis with   SW working on SNF placement  Possible d/c tomorrow     Review of Systems All other Review of Systems were found to be negative expect for that mentioned already in HPI.     Objective:   Physical Exam  BP (!) 119/58   Pulse 86   Temp 98.1 °F (36.7 °C)   Resp 17   Ht 5' 7.5" (1.715 m)   Wt 58.1 kg (128 lb 1.4 oz)   SpO2 (!) 86%   " Breastfeeding? No   BMI 19.77 kg/m²   Vitals reviewed.    Constitutional: No acute distress. Thin built  HENT: Atraumatic. PERRLA, mucositis+  Cardiovascular: Normal rate, regular rhythm and normal heart sounds.   Pulmonary/Chest: Effort normal. She has no wheezes no rales. S/p port   Abdominal: Soft. no tenderness,no distension,bowel sounds are normal, ileostomy with foamed yellow greenstool   On lee with hematuria  S/p ostomy ,lee with hematuria  Neurological: AOX3  Skin: Skin is warm and dry.     Following labs were Reviewed   Recent Labs   Lab 03/23/20  0449   WBC 10.42   HGB 8.5*   HCT 26.5*   *   CALCIUM 8.6*   ALBUMIN 2.1*   PROT 5.5*      K 4.8   CO2 28      BUN 36*   CREATININE 1.8*   ALKPHOS 301*   ALT 19   AST 40   BILITOT 1.2*     No results found for: POCTGLUCOSE     Microbiology Results (last 7 days)     Procedure Component Value Units Date/Time    Urine Culture High Risk [[id number]]  (Abnormal)  (Susceptibility) Collected:  03/15/20 4470    Order Status:  Completed Specimen:  Urine, Catheterized Updated:  03/17/20 0632     Urine Culture, Routine KLEBSIELLA PNEUMONIAE  >100,000 cfu/ml      Narrative:       Indicated criteria for high risk culture:->Other  Other (specify):->sick        X-Ray Chest AP Portable   Final Result      CT Chest Without Contrast   Final Result      1. Diffuse bilateral abnormal interstitial prominence with multifocal bilateral ground-glass opacities, favored to reflect pulmonary edema.  Pneumonia is felt less likely, but not absolutely excluded.   2. Small left and small to moderate right pleural effusions.   3. Small pericardial effusion.   4. Dense coronary artery atherosclerotic calcification.   5. Ectasia of the thoracic aorta as discussed, stable.   6. Additional findings as above.         Electronically signed by: Dixon Thomas MD   Date:    03/19/2020   Time:    16:07      X-Ray Chest AP Portable   Final Result      No significant interval  change in diffuse interstitial opacities, potentially reflecting acute congestive failure and or hypervolemia, viral or atypical pneumonia, and or developing ARDS.         Electronically signed by: Mike Martinez MD   Date:    03/18/2020   Time:    15:49      X-Ray Chest AP Portable   Final Result      Increasing diffuse bilateral lung opacity consistent with pulmonary edema, ARDS, or diffuse pneumonia         Electronically signed by: Maykel Schofield MD   Date:    03/16/2020   Time:    14:01      US Upper Extremity Veins Left   Final Result      Negative for left upper extremity deep venous thrombosis.         Electronically signed by: Mike Martinez MD   Date:    03/15/2020   Time:    10:59      X-Ray Chest AP Portable   Final Result      1. Slightly increasing airspace disease in the left upper lobe when compared to March 10, with slightly increasing small left pleural effusion.  Additional stable findings as described.  Findings could be on the basis of pulmonary edema or pneumonia.         Electronically signed by: Dixon Thomas MD   Date:    03/13/2020   Time:    15:08      X-Ray Chest AP Portable   Final Result      1.  Findings of mild pulmonary vascular congestion/edema with trace bilateral pleural effusions.      2.  Right-sided IJ central venous catheter, which takes a hairpin turn at its distal tip at the level of the SVC.         Electronically signed by: Rashi Wilder MD   Date:    03/10/2020   Time:    17:43          Inpatient medications  Scheduled Meds:   albuterol  2 puff Inhalation Q6H WAKE    azithromycin  500 mg Intravenous Q24H    diphenhydrAMINE  6.25 mg Intravenous Q6H    diphenoxylate-atropine 2.5-0.025 mg  1 tablet Oral QID    dronabinoL  2.5 mg Oral BID AC    enoxparin  30 mg Subcutaneous Q24H    epoetin nica-ebpx (RETACRIT) injection  100 Units/kg Subcutaneous Q7 Days    levothyroxine  88 mcg Oral Before breakfast    loperamide  4 mg Oral QID    magnesium oxide  400 mg Oral BID     pantoprazole  40 mg Intravenous BID    psyllium husk (with sugar)  2 packet Oral TID    sertraline  50 mg Oral Daily    sodium chloride  1 spray Each Nostril Daily    torsemide  20 mg Oral BID     Continuous Infusions:   dextrose 10 % in water (D10W)       PRN Meds:.sodium chloride, acetaminophen, benzocaine, butalbital-acetaminophen-caffeine -40 mg, calcium chloride IVPB, calcium chloride IVPB, calcium chloride IVPB, calcium chloride IVPB, calcium chloride IVPB, calcium chloride IVPB, cyclobenzaprine, dextrose 10 % in water (D10W), dextrose 50%, dextrose 50%, furosemide (LASIX) IV, glucagon (human recombinant), glucose, glucose, HYDROcodone-acetaminophen, hyoscyamine, magnesium oxide, magnesium oxide, magnesium sulfate IVPB, magnesium sulfate IVPB, magnesium sulfate IVPB, magnesium sulfate IVPB, magnesium sulfate IVPB, magnesium sulfate IVPB, magnesium sulfate IVPB, magnesium sulfate IVPB, melatonin, ondansetron, potassium chloride in water, potassium chloride in water, potassium chloride in water, potassium chloride in water, potassium chloride in water, potassium chloride in water, potassium chloride in water, potassium chloride in water, potassium chloride, potassium chloride, potassium chloride, potassium chloride, potassium chloride, potassium chloride, potassium chloride, potassium chloride, sodium chloride 0.9%, sodium phosphate IVPB, sodium phosphate IVPB, sodium phosphate IVPB, sodium phosphate IVPB, sodium phosphate IVPB, sodium phosphate IVPB, sodium phosphate IVPB, sodium phosphate IVPB, sodium phosphate IVPB, sodium phosphate IVPB      Assessment & Plan:   Shara Hutchins is a 69 y.o. female admitted for    Malnutrition  off TPN with formed  Output from ostomy   Trial of discontinue octreotide drip  Was on TPN for nutrition support with suspected short gut syndrome  Will need reversal of ostomy with General surgery in several weeks  Awaiting for SNF placement  PT/OT  Resolved   thrombocytopenia  S/p 2 PRBC. On epo    Urinary retension with UTI  UCx -Klebsilla on rocephin tx completed  On lee with saline  irrigation   Urology eval appreciated, recommed OP f/u    DARCI/CKD Iii  Monitor urine output  Nephrology on board    Unstageable pressure injury of skin and tissue  Wound care ordered    Anemia of chronic disease  Transfuse 2unit of prbc's  ADAM weekly 100 units/kg SQ.                     Core measures:  -DNR  - DVT PPx: bilateral SCDs   - lines/ tubes: iliostomy, Lees cath       Discharge Planning:   Awaiting for SNF placement  .       Above encounter included review of the medical records, interviewing and examining the patient face-to-face, discussion with family and other health care providers, ordering and interpreting lab/test results and formulating a plan of care.     Medical Decision Making:      [] Low Complexity  [x] Moderate Complexity  [] High Complexity

## 2020-03-23 NOTE — PLAN OF CARE
03/23/20 0728   Patient Assessment/Suction   Level of Consciousness (AVPU) alert   Respiratory Effort Unlabored   All Lung Fields Breath Sounds clear   PRE-TX-O2   O2 Device (Oxygen Therapy) nasal cannula   $ Is the patient on Low Flow Oxygen? Yes   SpO2 99 %   Pulse Oximetry Type Intermittent   $ Pulse Oximetry - Multiple Charge Pulse Oximetry - Multiple   Pulse 94   Resp 20   Inhaler   $ Inhaler Charges MDI (Metered Dose Inahler) Treatment   Daily Review of Necessity (Inhaler) completed   Respiratory Treatment Status (Inhaler) given   Treatment Route (Inhaler) mouthpiece   Patient Position (Inhaler) HOB elevated   Post Treatment Assessment (Inhaler) breath sounds unchanged   Signs of Intolerance (Inhaler) none

## 2020-03-24 LAB
ALBUMIN SERPL BCP-MCNC: 2.3 G/DL (ref 3.5–5.2)
ALP SERPL-CCNC: 310 U/L (ref 55–135)
ALT SERPL W/O P-5'-P-CCNC: 22 U/L (ref 10–44)
ANION GAP SERPL CALC-SCNC: 11 MMOL/L (ref 8–16)
AST SERPL-CCNC: 36 U/L (ref 10–40)
BILIRUB SERPL-MCNC: 1.1 MG/DL (ref 0.1–1)
BUN SERPL-MCNC: 35 MG/DL (ref 8–23)
CALCIUM SERPL-MCNC: 8.7 MG/DL (ref 8.7–10.5)
CHLORIDE SERPL-SCNC: 101 MMOL/L (ref 95–110)
CO2 SERPL-SCNC: 26 MMOL/L (ref 23–29)
CREAT SERPL-MCNC: 2.1 MG/DL (ref 0.5–1.4)
ERYTHROCYTE [DISTWIDTH] IN BLOOD BY AUTOMATED COUNT: 16.6 % (ref 11.5–14.5)
EST. GFR  (AFRICAN AMERICAN): 27.1 ML/MIN/1.73 M^2
EST. GFR  (NON AFRICAN AMERICAN): 23.5 ML/MIN/1.73 M^2
GLUCOSE SERPL-MCNC: 81 MG/DL (ref 70–110)
HCT VFR BLD AUTO: 26.5 % (ref 37–48.5)
HGB BLD-MCNC: 8.4 G/DL (ref 12–16)
MAGNESIUM SERPL-MCNC: 1.6 MG/DL (ref 1.6–2.6)
MCH RBC QN AUTO: 30.2 PG (ref 27–31)
MCHC RBC AUTO-ENTMCNC: 31.7 G/DL (ref 32–36)
MCV RBC AUTO: 95 FL (ref 82–98)
PHOSPHATE SERPL-MCNC: 5.6 MG/DL (ref 2.7–4.5)
PLATELET # BLD AUTO: 529 K/UL (ref 150–350)
PMV BLD AUTO: 10.9 FL (ref 9.2–12.9)
POTASSIUM SERPL-SCNC: 4.5 MMOL/L (ref 3.5–5.1)
PROT SERPL-MCNC: 5.6 G/DL (ref 6–8.4)
RBC # BLD AUTO: 2.78 M/UL (ref 4–5.4)
SODIUM SERPL-SCNC: 138 MMOL/L (ref 136–145)
WBC # BLD AUTO: 9.45 K/UL (ref 3.9–12.7)

## 2020-03-24 PROCEDURE — 63600175 PHARM REV CODE 636 W HCPCS

## 2020-03-24 PROCEDURE — 25000003 PHARM REV CODE 250: Performed by: INTERNAL MEDICINE

## 2020-03-24 PROCEDURE — 63600175 PHARM REV CODE 636 W HCPCS: Performed by: INTERNAL MEDICINE

## 2020-03-24 PROCEDURE — 84100 ASSAY OF PHOSPHORUS: CPT

## 2020-03-24 PROCEDURE — 87186 SC STD MICRODIL/AGAR DIL: CPT | Mod: 59

## 2020-03-24 PROCEDURE — 51700 PR IRRIGATION, BLADDER: ICD-10-PCS | Mod: ,,, | Performed by: UROLOGY

## 2020-03-24 PROCEDURE — 85027 COMPLETE CBC AUTOMATED: CPT

## 2020-03-24 PROCEDURE — 25000003 PHARM REV CODE 250: Performed by: FAMILY MEDICINE

## 2020-03-24 PROCEDURE — 51700 IRRIGATION OF BLADDER: CPT | Mod: ,,, | Performed by: UROLOGY

## 2020-03-24 PROCEDURE — 94640 AIRWAY INHALATION TREATMENT: CPT

## 2020-03-24 PROCEDURE — 87086 URINE CULTURE/COLONY COUNT: CPT

## 2020-03-24 PROCEDURE — 21400001 HC TELEMETRY ROOM

## 2020-03-24 PROCEDURE — 94761 N-INVAS EAR/PLS OXIMETRY MLT: CPT

## 2020-03-24 PROCEDURE — 27000221 HC OXYGEN, UP TO 24 HOURS

## 2020-03-24 PROCEDURE — 87077 CULTURE AEROBIC IDENTIFY: CPT | Mod: 59

## 2020-03-24 PROCEDURE — 80053 COMPREHEN METABOLIC PANEL: CPT

## 2020-03-24 PROCEDURE — 99233 PR SUBSEQUENT HOSPITAL CARE,LEVL III: ICD-10-PCS | Mod: 25,,, | Performed by: UROLOGY

## 2020-03-24 PROCEDURE — 25000003 PHARM REV CODE 250: Performed by: STUDENT IN AN ORGANIZED HEALTH CARE EDUCATION/TRAINING PROGRAM

## 2020-03-24 PROCEDURE — 99900035 HC TECH TIME PER 15 MIN (STAT)

## 2020-03-24 PROCEDURE — 83735 ASSAY OF MAGNESIUM: CPT

## 2020-03-24 PROCEDURE — 99232 SBSQ HOSP IP/OBS MODERATE 35: CPT | Mod: ,,, | Performed by: INTERNAL MEDICINE

## 2020-03-24 PROCEDURE — C9113 INJ PANTOPRAZOLE SODIUM, VIA: HCPCS | Performed by: INTERNAL MEDICINE

## 2020-03-24 PROCEDURE — 99232 PR SUBSEQUENT HOSPITAL CARE,LEVL II: ICD-10-PCS | Mod: ,,, | Performed by: INTERNAL MEDICINE

## 2020-03-24 PROCEDURE — 99233 SBSQ HOSP IP/OBS HIGH 50: CPT | Mod: 25,,, | Performed by: UROLOGY

## 2020-03-24 RX ORDER — DIPHENHYDRAMINE HYDROCHLORIDE 50 MG/ML
6.25 INJECTION INTRAMUSCULAR; INTRAVENOUS EVERY 6 HOURS PRN
Status: DISCONTINUED | OUTPATIENT
Start: 2020-03-24 | End: 2020-03-27 | Stop reason: HOSPADM

## 2020-03-24 RX ORDER — ALBUTEROL SULFATE 90 UG/1
2 AEROSOL, METERED RESPIRATORY (INHALATION)
Status: DISCONTINUED | OUTPATIENT
Start: 2020-03-24 | End: 2020-03-25

## 2020-03-24 RX ORDER — HYDROMORPHONE HYDROCHLORIDE 1 MG/ML
INJECTION, SOLUTION INTRAMUSCULAR; INTRAVENOUS; SUBCUTANEOUS
Status: COMPLETED
Start: 2020-03-24 | End: 2020-03-24

## 2020-03-24 RX ORDER — HYDROMORPHONE HYDROCHLORIDE 1 MG/ML
0.5 INJECTION, SOLUTION INTRAMUSCULAR; INTRAVENOUS; SUBCUTANEOUS ONCE
Status: COMPLETED | OUTPATIENT
Start: 2020-03-24 | End: 2020-03-25

## 2020-03-24 RX ORDER — TORSEMIDE 10 MG/1
10 TABLET ORAL DAILY
Status: DISCONTINUED | OUTPATIENT
Start: 2020-03-24 | End: 2020-03-24

## 2020-03-24 RX ADMIN — TORSEMIDE 20 MG: 20 TABLET ORAL at 10:03

## 2020-03-24 RX ADMIN — PANTOPRAZOLE SODIUM 40 MG: 40 INJECTION, POWDER, LYOPHILIZED, FOR SOLUTION INTRAVENOUS at 10:03

## 2020-03-24 RX ADMIN — DRONABINOL 2.5 MG: 2.5 CAPSULE ORAL at 04:03

## 2020-03-24 RX ADMIN — PSYLLIUM HUSK 2 PACKET: 3.4 POWDER ORAL at 10:03

## 2020-03-24 RX ADMIN — HYDROMORPHONE HYDROCHLORIDE 0.5 MG: 1 INJECTION, SOLUTION INTRAMUSCULAR; INTRAVENOUS; SUBCUTANEOUS at 04:03

## 2020-03-24 RX ADMIN — DIPHENHYDRAMINE HYDROCHLORIDE 6.25 MG: 50 INJECTION INTRAMUSCULAR; INTRAVENOUS at 05:03

## 2020-03-24 RX ADMIN — HYOSCYAMINE SULFATE 0.12 MG: 0.12 TABLET ORAL at 01:03

## 2020-03-24 RX ADMIN — CLOTRIMAZOLE 10 MG: 10 LOZENGE ORAL; TOPICAL at 04:03

## 2020-03-24 RX ADMIN — DIPHENOXYLATE HYDROCHLORIDE AND ATROPINE SULFATE 1 TABLET: 2.5; .025 TABLET ORAL at 02:03

## 2020-03-24 RX ADMIN — DIPHENHYDRAMINE HYDROCHLORIDE 6.25 MG: 50 INJECTION INTRAMUSCULAR; INTRAVENOUS at 12:03

## 2020-03-24 RX ADMIN — CLOTRIMAZOLE 10 MG: 10 LOZENGE ORAL; TOPICAL at 10:03

## 2020-03-24 RX ADMIN — DIPHENOXYLATE HYDROCHLORIDE AND ATROPINE SULFATE 1 TABLET: 2.5; .025 TABLET ORAL at 08:03

## 2020-03-24 RX ADMIN — PANTOPRAZOLE SODIUM 40 MG: 40 INJECTION, POWDER, LYOPHILIZED, FOR SOLUTION INTRAVENOUS at 08:03

## 2020-03-24 RX ADMIN — DRONABINOL 2.5 MG: 2.5 CAPSULE ORAL at 05:03

## 2020-03-24 RX ADMIN — SERTRALINE HYDROCHLORIDE 50 MG: 50 TABLET ORAL at 10:03

## 2020-03-24 RX ADMIN — HYOSCYAMINE SULFATE 0.12 MG: 0.12 TABLET ORAL at 08:03

## 2020-03-24 RX ADMIN — DIPHENOXYLATE HYDROCHLORIDE AND ATROPINE SULFATE 1 TABLET: 2.5; .025 TABLET ORAL at 04:03

## 2020-03-24 RX ADMIN — CYCLOBENZAPRINE HYDROCHLORIDE 5 MG: 5 TABLET, FILM COATED ORAL at 11:03

## 2020-03-24 RX ADMIN — LOPERAMIDE HYDROCHLORIDE 4 MG: 2 CAPSULE ORAL at 10:03

## 2020-03-24 RX ADMIN — LEVOTHYROXINE SODIUM 88 MCG: 88 TABLET ORAL at 05:03

## 2020-03-24 RX ADMIN — DIPHENHYDRAMINE HYDROCHLORIDE 6.25 MG: 50 INJECTION, SOLUTION INTRAMUSCULAR; INTRAVENOUS at 09:03

## 2020-03-24 RX ADMIN — HYDROCODONE BITARTRATE AND ACETAMINOPHEN 1 TABLET: 5; 325 TABLET ORAL at 01:03

## 2020-03-24 RX ADMIN — ALBUTEROL SULFATE 2 PUFF: 90 AEROSOL, METERED RESPIRATORY (INHALATION) at 07:03

## 2020-03-24 RX ADMIN — CLOTRIMAZOLE 10 MG: 10 LOZENGE ORAL; TOPICAL at 02:03

## 2020-03-24 RX ADMIN — DIPHENHYDRAMINE HYDROCHLORIDE 6.25 MG: 50 INJECTION INTRAMUSCULAR; INTRAVENOUS at 11:03

## 2020-03-24 RX ADMIN — MAGNESIUM OXIDE 400 MG: 400 TABLET ORAL at 10:03

## 2020-03-24 RX ADMIN — CLOTRIMAZOLE 10 MG: 10 LOZENGE ORAL; TOPICAL at 08:03

## 2020-03-24 RX ADMIN — DIPHENOXYLATE HYDROCHLORIDE AND ATROPINE SULFATE 1 TABLET: 2.5; .025 TABLET ORAL at 10:03

## 2020-03-24 RX ADMIN — ALBUTEROL SULFATE 2 PUFF: 90 AEROSOL, METERED RESPIRATORY (INHALATION) at 02:03

## 2020-03-24 RX ADMIN — MAGNESIUM OXIDE 400 MG: 400 TABLET ORAL at 08:03

## 2020-03-24 RX ADMIN — AZITHROMYCIN MONOHYDRATE 500 MG: 500 INJECTION, POWDER, LYOPHILIZED, FOR SOLUTION INTRAVENOUS at 08:03

## 2020-03-24 NOTE — PROGRESS NOTES
Interval History:   Main complaint has been recurrent hematuria and clots in her Godwin catheter.  She is breathing better up feels better from pulmonary and cardiac standpoint.        Review of Systems     Denies Chest pain, sob, or palpitations  No recent fever, cough chills or congestion  Hematuria is still persisting with clots evident in the tubing the Godwin catheter.    No myalgias  No recent arm, neck, or jaw pain  No lightheadedness or dizziness  No Double vision, blurry, vision or headache     Objective:     Vital Signs (Most Recent):  Temp: 98.3 °F (36.8 °C) (03/24/20 1119)  Pulse: 78 (03/24/20 1119)  Resp: 20 (03/24/20 1119)  BP: (!) 103/56 (03/24/20 1119)  SpO2: 100 % (03/24/20 1119) Vital Signs (24h Range):  Temp:  [97.9 °F (36.6 °C)-98.3 °F (36.8 °C)] 98.3 °F (36.8 °C)  Pulse:  [78-92] 78  Resp:  [17-25] 20  SpO2:  [95 %-100 %] 100 %  BP: ()/(55-78) 103/56     Weight: 55.6 kg (122 lb 9.2 oz)  Body mass index is 18.91 kg/m².     SpO2: 100 %  O2 Device (Oxygen Therapy): nasal cannula      Intake/Output Summary (Last 24 hours) at 3/24/2020 1139  Last data filed at 3/24/2020 0943  Gross per 24 hour   Intake 1680 ml   Output 6300 ml   Net -4620 ml       Lines/Drains/Airways     Peripherally Inserted Central Catheter Line            PICC Double Lumen 03/06/20 1154 other (see comments) 17 days          Drain                 Hemodialysis AV Fistula Left upper arm -- days         Ileostomy 02/13/20 1600 Loop RLQ 39 days         Urethral Catheter 03/16/20 2230 Straight-tip 18 Fr. 7 days                   albuterol  2 puff Inhalation Q6H WAKE    azithromycin  500 mg Intravenous Q24H    clotrimazole  10 mg Oral QID    diphenhydrAMINE  6.25 mg Intravenous Q6H    diphenoxylate-atropine 2.5-0.025 mg  1 tablet Oral QID    dronabinoL  2.5 mg Oral BID AC    enoxparin  30 mg Subcutaneous Q24H    epoetin nica-ebpx (RETACRIT) injection  100 Units/kg Subcutaneous Q7 Days    levothyroxine  88 mcg Oral Before  breakfast    loperamide  4 mg Oral QID    magnesium oxide  400 mg Oral BID    pantoprazole  40 mg Intravenous BID    psyllium husk (with sugar)  2 packet Oral TID    sertraline  50 mg Oral Daily    sodium chloride  1 spray Each Nostril Daily    torsemide  20 mg Oral BID         PHYSICAL EXAM:  Constitutional: Well built, well nourished in no apparent distress  Neck: no carotid bruit, no JVD  Lungs:  Much improved scattered rhonchi no wheezes Chest Wall: no tenderness  Heart: regular rate and rhythm, S1, S2 normal, no murmur, click, rub or gallop   Abdomen: soft, non-tender; bowel sounds normal; no masses,  no organomegaly  Extremities: Extremities normal, no edema  Neuro: AAO X 3  Indwelling Godwin catheter noted with presence of hematuria    I HAVE REVIEWED :    The vital signs, nurses notes, and all the pertinent radiology and labs.       Significant Labs:   Results for UMAIR CHA (MRN [id number]) as of 3/24/2020 11:40   Ref. Range 3/23/2020 20:55 3/24/2020 04:32   WBC Latest Ref Range: 3.90 - 12.70 K/uL  9.45   RBC Latest Ref Range: 4.00 - 5.40 M/uL  2.78 (L)   Hemoglobin Latest Ref Range: 12.0 - 16.0 g/dL  8.4 (L)   Hematocrit Latest Ref Range: 37.0 - 48.5 %  26.5 (L)   MCV Latest Ref Range: 82 - 98 fL  95   MCH Latest Ref Range: 27.0 - 31.0 pg  30.2   MCHC Latest Ref Range: 32.0 - 36.0 g/dL  31.7 (L)   RDW Latest Ref Range: 11.5 - 14.5 %  16.6 (H)   Platelets Latest Ref Range: 150 - 350 K/uL  529 (H)   MPV Latest Ref Range: 9.2 - 12.9 fL  10.9   Sodium Latest Ref Range: 136 - 145 mmol/L  138   Potassium Latest Ref Range: 3.5 - 5.1 mmol/L  4.5   Chloride Latest Ref Range: 95 - 110 mmol/L  101   CO2 Latest Ref Range: 23 - 29 mmol/L  26   Anion Gap Latest Ref Range: 8 - 16 mmol/L  11   BUN, Bld Latest Ref Range: 8 - 23 mg/dL  35 (H)   Creatinine Latest Ref Range: 0.5 - 1.4 mg/dL  2.1 (H)   eGFR if non African American Latest Ref Range: >60 mL/min/1.73 m^2  23.5 (A)   eGFR if African American Latest Ref Range:  >60 mL/min/1.73 m^2  27.1 (A)   Glucose Latest Ref Range: 70 - 110 mg/dL  81   Calcium Latest Ref Range: 8.7 - 10.5 mg/dL  8.7   Phosphorus Latest Ref Range: 2.7 - 4.5 mg/dL  5.6 (H)   Magnesium Latest Ref Range: 1.6 - 2.6 mg/dL  1.6   Alkaline Phosphatase Latest Ref Range: 55 - 135 U/L  310 (H)   PROTEIN TOTAL Latest Ref Range: 6.0 - 8.4 g/dL  5.6 (L)   Albumin Latest Ref Range: 3.5 - 5.2 g/dL  2.3 (L)   BILIRUBIN TOTAL Latest Ref Range: 0.1 - 1.0 mg/dL  1.1 (H)   AST Latest Ref Range: 10 - 40 U/L  36   ALT Latest Ref Range: 10 - 44 U/L  22   POC Glucose Latest Ref Range: 70 - 110  92        Significant Imaging:   Telemetry recording shows continuous sinus rhythm.      I HAVE REVIEWED :    The vital signs, nurses notes, and all the pertinent radiology and labs.       ASSESSMENT & PLAN:      1. SOB and Hypoxemia significantly improved  2. Elevated BNP clinically improved  3. CXR show hypervolemia atypical pneumonia or developing ARDs on yesterday  4. Anemia and hematuria  5. Klebsiella Pneumoniae to urine  6. DARCI on CKD Stage III  7. Unstageable pressure ulcer  8. Inflammatory Bowel Disease  9. Thyroid Dysfunction  10. S/P Ileostomy  11. H/O Vulvar Cancer   12. Hypokalemia  13. Low Grade Fever  14. Grade 1 Diastolic Dysfunction, Normal LVEF 60%  15. Mild-Moderate PH PA Pressure 47   16.  Acute decompensation of chronic congestive heart failure with diastolic dysfunction, ejection fraction of 60%  Seventeen.  Persistent hematuria    RECOMMENDATIONS  Patient appears euvolemic at this point.  Recommend to withhold next dose of diuretic and changed to once a day regimen push oral hydration  Regarding persistent hematuria she will benefit from continuous bladder irrigation at this point.  Her hemoglobin is maintaining on 8.4 this time  Patient has remained relatively stable cardiac standpoint.

## 2020-03-24 NOTE — ASSESSMENT & PLAN NOTE
· Due to pulmonary edema and volume overload  · Decrease diuresis due to increased creatinine  · Minimize fluids  · O2 to keep sats > 90%  · Prn albuterol  · Check weight daily  · Better, continue to decrease O2 as able

## 2020-03-24 NOTE — PT/OT/SLP PROGRESS
Occupational Therapy      Patient Name:  Shara Hutchins   MRN:  [id number]    Patient not seen today; pt reported concern about clots in lee and wanting to address that issue at this time.  Will follow-up next service date.      Alejandro Varghese OT  3/24/2020

## 2020-03-24 NOTE — PROGRESS NOTES
[facility]  Progress Note    Patient Name: Shara Hutchins  MRN: [id number]  Patient Class: IP- Inpatient   Admission Date: 3/7/2020  Length of Stay: 15 days  Attending Physician: Aleksander Phan MD  Primary Care Provider: April Horton MD        Subjective:     Principal Problem:Acute hypoxemic respiratory failure    Interval History:   Patient was admitted for severe dehydration and malnutrition with 20 lb weight loss at home due to high output from the ileostomy.    Needed TPN and IV fluids.  Patient continued to have high output to the ileostomy with multiple anti-diarrheal agents.  Patient was started on octreotide with improved diarrhea. .    Patient was eating well the whole breakfast this morning, no high output from ileostomy.  Gross hematuria noted in the urinary bag.  Hemoglobin is stable.  The wound at the back examined it is only superficial wound and getting dressing .  Renal function get worse today.    Review of Systems  Objective:     Vital Signs (Most Recent):  Temp: 98.3 °F (36.8 °C) (03/24/20 1119)  Pulse: 78 (03/24/20 1119)  Resp: 20 (03/24/20 1119)  BP: (!) 103/56 (03/24/20 1119)  SpO2: 100 % (03/24/20 1119) Vital Signs (24h Range):  Temp:  [97.9 °F (36.6 °C)-98.3 °F (36.8 °C)] 98.3 °F (36.8 °C)  Pulse:  [78-92] 78  Resp:  [17-25] 20  SpO2:  [95 %-100 %] 100 %  BP: ()/(55-78) 103/56     Weight: 55.6 kg (122 lb 9.2 oz)  Body mass index is 18.91 kg/m².    Intake/Output Summary (Last 24 hours) at 3/24/2020 1124  Last data filed at 3/24/2020 0943  Gross per 24 hour   Intake 1680 ml   Output 6300 ml   Net -4620 ml      Physical Exam      Overview/Hospital Course:     Significant Labs:   BMP:   Recent Labs   Lab 03/24/20  0432   GLU 81      K 4.5      CO2 26   BUN 35*   CREATININE 2.1*   CALCIUM 8.7   MG 1.6     CBC:   Recent Labs   Lab 03/23/20  0449 03/24/20  0432   WBC 10.42 9.45   HGB 8.5* 8.4*   HCT 26.5* 26.5*   * 529*       Significant  Imaging: I have reviewed all pertinent imaging results/findings within the past 24 hours.      Physical Exam:  General- Patient alert and oriented x3 in NAD  HEENT- PERRLA, EOMI, OP clear, MMM  Neck- No JVD  CV- Regular rate and rhythm, No Murmur/jackie/rubs  Resp- Lungs clear  GI- Non tender/non-distended, BS normoactive   Extrem- No cyanosis, clubbing  Neuro- awake and alert     Assessment/Plan:       High output ileostomy secondary to previous history of bowel surgery with short bowel syndrome /history of inflammatory bowel disease :improved.         Acute hypoxemic respiratory failure at admission , resolved .    New onset hematuria with previous history of UTI with KLB pneumonia s/p completed Rx with rocephin. unsure cause currently  . U caht insitu          DARCI with Stage 5 chronic kidney disease not on chronic dialysis:back to base line         Pulmonary hypertension        Malnutrition        Debility         Unstageable pressure injury of skin and tissue     S/P ileostomy        Chronic diarrhea history      Iron deficiency anemia due to chronic blood loss        Inflammatory bowel disease        DNR (do not resuscitate)    PLAN   Continue saline irrigation   It appeared hematuria not getting better and re-consult Urology and may need intervention   Patient is to discharge back to skilled nursing facility when hematuria resolve  Continue current management and off TPN  DC lovenox  Reversal of ileostomy as per surgery   Hold torsemide  for the time being , follow renal function .    azithromycin possibly from chest x-ray findings and will continue for now .      Active Diagnoses:    Diagnosis Date Noted POA    PRINCIPAL PROBLEM:  Acute hypoxemic respiratory failure [J96.01] 03/18/2020 No    Pulmonary hypertension [I27.20] 03/20/2020 Unknown    Malnutrition [E46] 03/07/2020 Yes    Debility [R53.81] 03/07/2020 Yes    Unstageable pressure injury of skin and tissue [L89.95] 03/06/2020 Yes    DNR (do not  resuscitate) [Z66] 02/24/2020 Yes    S/P ileostomy [Z93.2] 02/13/2020 Not Applicable    Chronic diarrhea [K52.9] 02/10/2020 Yes     Chronic    Anemia of chronic disease [D63.8] 02/10/2020 Yes     Chronic    Stage 5 chronic kidney disease not on chronic dialysis [N18.5] 11/04/2018 Yes     Chronic    Iron deficiency anemia due to chronic blood loss [D50.0] 01/07/2018 Yes     Chronic    Inflammatory bowel disease [K52.9] 01/07/2018 Yes     Chronic      Problems Resolved During this Admission:    Diagnosis Date Noted Date Resolved POA    Thrombocytopenia, unspecified [D69.6] 03/07/2020 03/18/2020 Yes    Acute renal failure superimposed on stage 5 chronic kidney disease, not on chronic dialysis [N17.9, N18.5] 02/10/2020 03/18/2020 Yes     VTE Risk Mitigation (From admission, onward)         Ordered     enoxaparin injection 30 mg  Every 24 hours (non-standard times)      03/18/20 1524     Place sequential compression device  Until discontinued      03/08/20 0133                   Aleksander Phan MD  Department of Hospital Medicine   Formerly [facility]

## 2020-03-24 NOTE — PROGRESS NOTES
[facility]  Pulmonology  Progress Note    Patient Name: Shara Hutcihns  MRN: [id number]  Admission Date: 3/7/2020  Hospital Length of Stay: 15 days  Code Status: DNR  Attending Provider: Aleksander Phan MD  Primary Care Provider: April Horton MD   Principal Problem: Acute hypoxemic respiratory failure    Subjective:     Interval History:     3/19/2020 - Stable overnight, no new issues reported.  Weight is up 2 kg from admit but that weight is up about 10 kg from last hospital stay.  No new respiratory complaints, remains dyspneic and on NC O2.chest X-ray yesterday was unchanged.  I/O negative 2.2 liters.  Had atrial fibrillation last night and is back in NSR on cardizem.    3/20/2020 - Feels beter today, on lasix drip.  Weight decreased 2.6 kg, I/O negative 6.3 liters yesterday.  No new complaints.    3/21/2020 - Continnues to feel better, no neew complaints.  TPN has been stopped and she has started eating.  Is about 12 liters negative over the last 2 days    3/22/2020 - Stable overnight, no new complaints, she tells me that they are working on transfer to a rehab hospital. I/O 16.3 liters negative over last 3 days, no new weight today    3/23/2020 - stable overnight, continues to feel better , about 19-20 liters negative by I/O and visibly better.  Waiting on transfer to rehab.  O2 has decreased.    3/24/2020 - Stable overnight, no new issues reported.  Has blood and clots in urine. (being evaluated)    Review of Systems   Constitutional: Positive for malaise/fatigue and weight loss. Negative for chills, diaphoresis and fever.   HENT: Negative for congestion.    Eyes: Negative for pain.   Respiratory: Positive for cough and shortness of breath. Negative for hemoptysis, sputum production, wheezing and stridor.    Cardiovascular: Positive for orthopnea and leg swelling. Negative for chest pain, palpitations, claudication and PND.   Gastrointestinal: Positive for diarrhea. Negative for abdominal pain,  constipation, heartburn, nausea and vomiting.        Ostomy   Genitourinary: Negative for dysuria, frequency, hematuria and urgency.   Musculoskeletal: Negative for falls and myalgias.   Neurological: Positive for weakness (generalized). Negative for sensory change and focal weakness.   Psychiatric/Behavioral: Negative for depression and suicidal ideas. The patient is not nervous/anxious.          Objective:     Vital Signs (Most Recent):  Temp: 98 °F (36.7 °C) (03/24/20 0801)  Pulse: 88 (03/24/20 0801)  Resp: 20 (03/24/20 0801)  BP: (!) 116/56 (03/24/20 0801)  SpO2: 98 % (03/24/20 0801) Vital Signs (24h Range):  Temp:  [97.9 °F (36.6 °C)-98.3 °F (36.8 °C)] 98 °F (36.7 °C)  Pulse:  [85-92] 88  Resp:  [17-25] 20  SpO2:  [95 %-100 %] 98 %  BP: ()/(55-78) 116/56     Weight: 55.6 kg (122 lb 9.2 oz)  Body mass index is 18.91 kg/m².      Intake/Output Summary (Last 24 hours) at 3/24/2020 1031  Last data filed at 3/24/2020 0943  Gross per 24 hour   Intake 1680 ml   Output 6300 ml   Net -4620 ml       Physical Exam   Constitutional: She is oriented to person, place, and time. She appears well-developed and well-nourished. No distress.   Chronically ill female, nad  Sitting in bed   HENT:   Head: Normocephalic and atraumatic.   Right Ear: External ear normal.   Left Ear: External ear normal.   Nose: Nose normal.   Mouth/Throat: Oropharynx is clear and moist.   Eyes: Pupils are equal, round, and reactive to light. Conjunctivae and EOM are normal.   Neck: Normal range of motion. Neck supple. No JVD present. No tracheal deviation present. No thyromegaly present.   Cardiovascular: Normal rate, regular rhythm, normal heart sounds and intact distal pulses. Exam reveals no gallop and no friction rub.   No murmur heard.  Pulmonary/Chest: Effort normal. No stridor. No respiratory distress. She has no wheezes. She has rales (decreased). She exhibits no tenderness.   Decreased at bases but better   Abdominal: Soft. Bowel sounds  are normal. She exhibits no distension. There is no tenderness. There is no rebound and no guarding.   + ostomy   Musculoskeletal: Normal range of motion. She exhibits edema (in dependent areas). She exhibits no tenderness.   Lymphadenopathy:     She has no cervical adenopathy.   Neurological: She is alert and oriented to person, place, and time. No cranial nerve deficit.   Skin: Skin is warm and dry. She is not diaphoretic.   Psychiatric: She has a normal mood and affect. Her behavior is normal.   Nursing note and vitals reviewed.      Vents:  Oxygen Concentration (%): 45 (03/20/20 0702)    Lines/Drains/Airways     Peripherally Inserted Central Catheter Line            PICC Double Lumen 03/06/20 1154 other (see comments) 17 days          Drain                 Hemodialysis AV Fistula Left upper arm -- days         Ileostomy 02/13/20 1600 Loop RLQ 39 days         Urethral Catheter 03/16/20 2230 Straight-tip 18 Fr. 7 days                Significant Labs:    CBC/Anemia Profile:  Recent Labs   Lab 03/23/20  0449 03/24/20  0432   WBC 10.42 9.45   HGB 8.5* 8.4*   HCT 26.5* 26.5*   * 529*   MCV 94 95   RDW 17.7* 16.6*        Chemistries:  Recent Labs   Lab 03/23/20  0449 03/24/20  0432    138   K 4.8 4.5    101   CO2 28 26   BUN 36* 35*   CREATININE 1.8* 2.1*   CALCIUM 8.6* 8.7   ALBUMIN 2.1* 2.3*   PROT 5.5* 5.6*   BILITOT 1.2* 1.1*   ALKPHOS 301* 310*   ALT 19 22   AST 40 36   MG 1.9 1.6   PHOS 4.9* 5.6*       All pertinent labs within the past 24 hours have been reviewed.    BNP - 1502    Microbiology Results (last 7 days)     ** No results found for the last 168 hours. **            Significant Imaging:  I have reviewed and interpreted all pertinent imaging results/findings within the past 24 hours.     CXR (3/18) - about the same, bilateral infiltrates c/w fluid  CXR (3/20) - about the same    ECHO (3/19)  · Mild concentric left ventricular hypertrophy.  · Pulmonary hypertension present.  · Normal  left ventricular systolic function. The estimated ejection fraction is 60%.  · Grade I (mild) left ventricular diastolic dysfunction consistent with impaired relaxation.  · No wall motion abnormalities.  · Normal right ventricular systolic function.  · Intermediate central venous pressure (8 mmHg).  · Trivial pericardial effusion.  · There is a left pleural effusion.  · The estimated PA systolic pressure is 47 mmHg.    Assessment/Plan:     * Acute hypoxemic respiratory failure  · Due to pulmonary edema and volume overload  · Decrease diuresis due to increased creatinine  · Minimize fluids  · O2 to keep sats > 90%  · Prn albuterol  · Check weight daily  · Better, continue to decrease O2 as able    Pulmonary hypertension  · I suspect group 2 (diastolic dysfunction)  · Follow, no acute treatment needed    Malnutrition  · Fairly severe, follow  · TPN stopped and oral diet increased    Debility  · PT, OT as able    Unstageable pressure injury of skin and tissue  · Continue with local care    DNR (do not resuscitate)  · Aware     S/P ileostomy  · Aware     Chronic diarrhea  · S/p ostomy  · Pt reports that stools have more substance    Stage 5 chronic kidney disease not on chronic dialysis  · Aware     Iron deficiency anemia due to chronic blood loss  · Aware, has some minor blood in lee  · Transfuse for hgb < 7    Inflammatory bowel disease  · Aware            Moo Maharaj MD  Pulmonology  [facility]

## 2020-03-24 NOTE — PT/OT/SLP PROGRESS
Physical Therapy      Patient Name:  Shara Hutchins   MRN:  [id number]    Patient not seen today secondary to Nursing care(Pt unable to be seen x 2 attempts. In AM, patient requesting PTA come back later because her nurse had not assessed her yet; in PM PTA unable to see pt 2/2 RN stating that they are attempting to put 3 way cathater in and are waiting for tubing to be sent up.). Will follow-up 03/25/20.    Amanda Ayers, PTA

## 2020-03-24 NOTE — PROGRESS NOTES
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 Lee was placed on admission to monitor UOP. CTRSS 2/12 2/23 showed bilateral nonobstructing renal stones. She underwent loop ileostomy on 2/13/20 for chronic diarrhea. rbus on 2/26 showed no hydro, b renal stones and indeterminate artifact. She went to [facility] on 3/7 and returned and states gross hematuria started after this. Prior to admission pt denied having gross hematuria, urinary frequency, vaginal bleeding or frequent uti's. She did however state that her vulvar itching had returned and she was concerned about recurrence of cancer with no recent f/u by gyn onc. She has seen by  inpt and plan was for outpt f/u with .     She has been awaiting LTAC placement on 3/13  was consulted for persistent hematuria. His plan was for outpt cysto to eval for any bladder path not seen on ct and discussion of renal stones as well as discharging with lee. Her hematuria continued, ucx on 3/15 grew klebsiella and she was started on rocephin and completed a 5d course. Around 3/17  she was given a voiding trial vs lee exchange? on 3/17 for larger 18fr.      She continued to have hematuria requiring intermittent irrigation and 2 transfusions (last one 2d agao on 3/21/20). Urology was re-consulted today, I asked them to remove the 18fr, place a 22 [place] 3 way, irrigate and start cbi but they have continued to have trouble with the cbi.      Objective:     Temp:  [97.9 °F (36.6 °C)-98.3 °F (36.8 °C)] 98.3 °F (36.8 °C)  Pulse:  [78-89] 78  Resp:  [17-24] 20  SpO2:  [95 %-100 %]  100 %  BP: ()/(55-78) 103/56  I/O last 3 completed shifts:  In: 3240 [P.O.:2640; I.V.:100; IV Piggyback:500]  Out: 7300 [Urine:3150; Stool:4150]  I/O this shift:  In: 240 [P.O.:240]  Out: 1250 [Urine:250; Stool:1000]    UOP - 1150/400/250    Physical Exam   Physical Exam:  Gen: cachectic  Neuro: awake,  alert and oriented,   Psych: no distress,  cooperative  HEENT: no nasal discharge, normal cephalic, atraumatic  Eyes: conjunctiva clear without discharge,  glasses   Skin: clear  Resp: non-labored  Cv:no pallor, no lower extremity edema  MSK: normal cephalic atraumatic, decreased range of motion, decreased muscle tone  Tender to palpation in pelvis but bladder non-distended    Bedside lee manipulation/change/irrigation  Able to irrigate through the lee but unable to flush through inport. Minimal clots returned initially. Changed this for a 24 fr 3 way. Able to irrigate some bigger old clots but very few and urine still clear. Still was having trouble flushing it. Vulva very swollen and inflamed. No obvious masses felt in urethra with placement of new catheter done by feel. Sat patient up and then bladder drained and was able to irrigate, flush and flush through inport.  Irrigated for about an hour.     Labs:     Recent Labs   Lab 03/22/20  0342 03/23/20 0449 03/24/20  0432    136 138   K 4.0 4.8 4.5    103 101   CO2 27 28 26   BUN 36* 36* 35*   CREATININE 1.8* 1.8* 2.1*   CALCIUM 8.1* 8.6* 8.7     Recent Labs   Lab 03/22/20  0342 03/23/20  0449 03/24/20  0432   WBC 10.15 10.42 9.45   HGB 7.0* 8.5* 8.4*   HCT 22.5* 26.5* 26.5*   * 456* 529*       Radiology:  See hpi    Assessment:     Shara Hutchins is a 69 y.o. female with bilateral non obstructing stones and gross hematuria secondary to uti, lee trauma, possible radiation cystitis, bladder tumor or recurrent vulvar cancer.     Plan:     · Continue CBI and titrate to light pink then off if urine remains clear.   · Suspect she has some  large clots in the bladder that will break up and clog catheter over time.   · Nurse to contact me with updates on irrigation via secure chat and documented in nurses notes  · If she continues to clot off can obtain a rbus (kidney bladder ultrasound) with full bladder to 1)ensure no hydro from any stones that could have dropped down to ureter and 2) eval if any large clot vs mass in bladder.  · If hematuria does not improve on cbi may need cystoscopic evaluation by  at some point or on call physician. Will need trial of CBI first.     Cannot be discharged on cbi   I will be covering today and tomorrow and  starting thursday    Still needs f/u with  to eval for recurrence of vulvar cancer  Still has ileostomy planned     Gaby Garcia MD  [facility] (Didi/Terri/Jose/Sunil)   Office: [phone]

## 2020-03-24 NOTE — PLAN OF CARE
"   03/24/20 0756   Discharge Reassessment   Assessment Type Discharge Planning Reassessment   Discharge Plan A Long-term acute care facility (LTAC)   Discharge Plan B Skilled Nursing Facility     Pt continues to be accepted by [facility], assuming [facility] provides authorization. Per [employer], they could address malnutrition and provide wound care, respiratory management, and ileostomy management. LINWOOD will be following up with [employer] this morning regarding resubmitting for auth.    Pt has been denied by the following 11 SNFs so far: [facility] (unable to meet medical needs), [facility] (unable to meet medical needs - feel she is high risk for LTC which they are not currently taking), [facility] (don't take her insurance), [facility] (unable to meet medical needs - fel pt is too "medically sick"), [facility] (unable to meet medical needs), [facility]  (as of this morning they are accepting no new pts due to COVID-19),[facility] [place] (no bed availability), [facility] in [place] (out of network), [facility] (unable to meet medical needs), [facility] (no bed availability), [facility] (out of network). LINWOOD discussed the possibility of home health with PT supervisor Nereyda yesterday. Per Nereyda pt would not be safe at home with HH without 24 hour care, and pt lives alone per her last CM assessment.    8:41 LINWOOD spoke with Basia from [facility] this morning, who stated that new LTAC request should be faxed in as a fast appeal. Eufemia from [facility] will be submitting and is working on an appeal letter. LINWOOD spoke with wound RN Malaika at x [phone] and requested a re-evaluation, which she will complete this morning. LINWOOD will fax this to [employer] to be included with appeal packet.     15:05 LINWOOD followed up on     10:34 LINWOOD sent updated clinical information from this morning to [employer], who has been in touch with [facility] this morning.    11:12 Eufemia has sent updated clinicals to " [facility]. Alla RN from [facility] told [facility] they will try to have a response by tomorrow although they have 72 hours. Alla's contact is T: [phone], F: [phone].    15:21 LINWOOD following up on 4 remaining pending SNF referrals on the [facility] given that pt does not want to go to [facility]:   [facility] ([phone]): Per Lay they cannot take any dialysis patients. SW informed pt is not on dialysis. Lay stated she will have her director take another look.  [facility] ([phone]): Lorraine had some clinical questions, and SW consulted Dr. Phan and provided answers. They are waiting to see another chest xray, which Dr. Phan stated can be ordered for tomorrow.   [facility] ([phone]): They are not currently taking any new pts due to COVID-19.  [facility] ([phone]): LINWOOD was told they are not in network for SNF although they are listed as a SNF provider on [facility]'s website.

## 2020-03-24 NOTE — PLAN OF CARE
03/24/20 1007   Discharge Reassessment   Assessment Type Discharge Planning Reassessment   Anticipated Discharge Disposition LTAC   Discharge Plan A Long-term acute care facility (LTAC)   Discharge Plan B Skilled Nursing Facility     SW spoke with would care RN, Malaika, who stated that the pt continues to have blood in her catheter, requiring PRN blood transfusions. Malaika stated that the pt is also experiencing large amounts of stool in her ostomy. This SW notified assigned SW of this information.
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
## 2020-03-24 NOTE — SUBJECTIVE & OBJECTIVE
Interval History:     3/19/2020 - Stable overnight, no new issues reported.  Weight is up 2 kg from admit but that weight is up about 10 kg from last hospital stay.  No new respiratory complaints, remains dyspneic and on NC O2.chest X-ray yesterday was unchanged.  I/O negative 2.2 liters.  Had atrial fibrillation last night and is back in NSR on cardizem.    3/20/2020 - Feels beter today, on lasix drip.  Weight decreased 2.6 kg, I/O negative 6.3 liters yesterday.  No new complaints.    3/21/2020 - Continnues to feel better, no neew complaints.  TPN has been stopped and she has started eating.  Is about 12 liters negative over the last 2 days    3/22/2020 - Stable overnight, no new complaints, she tells me that they are working on transfer to a rehab hospital. I/O 16.3 liters negative over last 3 days, no new weight today    3/23/2020 - stable overnight, continues to feel better , about 19-20 liters negative by I/O and visibly better.  Waiting on transfer to rehab.  O2 has decreased.    3/24/2020 - Stable overnight, no new issues reported.  Has blood and clots in urine. (being evaluated)    Review of Systems   Constitutional: Positive for malaise/fatigue and weight loss. Negative for chills, diaphoresis and fever.   HENT: Negative for congestion.    Eyes: Negative for pain.   Respiratory: Positive for cough and shortness of breath. Negative for hemoptysis, sputum production, wheezing and stridor.    Cardiovascular: Positive for orthopnea and leg swelling. Negative for chest pain, palpitations, claudication and PND.   Gastrointestinal: Positive for diarrhea. Negative for abdominal pain, constipation, heartburn, nausea and vomiting.        Ostomy   Genitourinary: Negative for dysuria, frequency, hematuria and urgency.   Musculoskeletal: Negative for falls and myalgias.   Neurological: Positive for weakness (generalized). Negative for sensory change and focal weakness.   Psychiatric/Behavioral: Negative for depression  and suicidal ideas. The patient is not nervous/anxious.          Objective:     Vital Signs (Most Recent):  Temp: 98 °F (36.7 °C) (03/24/20 0801)  Pulse: 88 (03/24/20 0801)  Resp: 20 (03/24/20 0801)  BP: (!) 116/56 (03/24/20 0801)  SpO2: 98 % (03/24/20 0801) Vital Signs (24h Range):  Temp:  [97.9 °F (36.6 °C)-98.3 °F (36.8 °C)] 98 °F (36.7 °C)  Pulse:  [85-92] 88  Resp:  [17-25] 20  SpO2:  [95 %-100 %] 98 %  BP: ()/(55-78) 116/56     Weight: 55.6 kg (122 lb 9.2 oz)  Body mass index is 18.91 kg/m².      Intake/Output Summary (Last 24 hours) at 3/24/2020 1031  Last data filed at 3/24/2020 0943  Gross per 24 hour   Intake 1680 ml   Output 6300 ml   Net -4620 ml       Physical Exam   Constitutional: She is oriented to person, place, and time. She appears well-developed and well-nourished. No distress.   Chronically ill female, nad  Sitting in bed   HENT:   Head: Normocephalic and atraumatic.   Right Ear: External ear normal.   Left Ear: External ear normal.   Nose: Nose normal.   Mouth/Throat: Oropharynx is clear and moist.   Eyes: Pupils are equal, round, and reactive to light. Conjunctivae and EOM are normal.   Neck: Normal range of motion. Neck supple. No JVD present. No tracheal deviation present. No thyromegaly present.   Cardiovascular: Normal rate, regular rhythm, normal heart sounds and intact distal pulses. Exam reveals no gallop and no friction rub.   No murmur heard.  Pulmonary/Chest: Effort normal. No stridor. No respiratory distress. She has no wheezes. She has rales (decreased). She exhibits no tenderness.   Decreased at bases but better   Abdominal: Soft. Bowel sounds are normal. She exhibits no distension. There is no tenderness. There is no rebound and no guarding.   + ostomy   Musculoskeletal: Normal range of motion. She exhibits edema (in dependent areas). She exhibits no tenderness.   Lymphadenopathy:     She has no cervical adenopathy.   Neurological: She is alert and oriented to person,  place, and time. No cranial nerve deficit.   Skin: Skin is warm and dry. She is not diaphoretic.   Psychiatric: She has a normal mood and affect. Her behavior is normal.   Nursing note and vitals reviewed.      Vents:  Oxygen Concentration (%): 45 (03/20/20 0702)    Lines/Drains/Airways     Peripherally Inserted Central Catheter Line            PICC Double Lumen 03/06/20 1154 other (see comments) 17 days          Drain                 Hemodialysis AV Fistula Left upper arm -- days         Ileostomy 02/13/20 1600 Loop RLQ 39 days         Urethral Catheter 03/16/20 2230 Straight-tip 18 Fr. 7 days                Significant Labs:    CBC/Anemia Profile:  Recent Labs   Lab 03/23/20  0449 03/24/20  0432   WBC 10.42 9.45   HGB 8.5* 8.4*   HCT 26.5* 26.5*   * 529*   MCV 94 95   RDW 17.7* 16.6*        Chemistries:  Recent Labs   Lab 03/23/20 0449 03/24/20  0432    138   K 4.8 4.5    101   CO2 28 26   BUN 36* 35*   CREATININE 1.8* 2.1*   CALCIUM 8.6* 8.7   ALBUMIN 2.1* 2.3*   PROT 5.5* 5.6*   BILITOT 1.2* 1.1*   ALKPHOS 301* 310*   ALT 19 22   AST 40 36   MG 1.9 1.6   PHOS 4.9* 5.6*       All pertinent labs within the past 24 hours have been reviewed.    BNP - 1502    Microbiology Results (last 7 days)     ** No results found for the last 168 hours. **            Significant Imaging:  I have reviewed and interpreted all pertinent imaging results/findings within the past 24 hours.     CXR (3/18) - about the same, bilateral infiltrates c/w fluid  CXR (3/20) - about the same    ECHO (3/19)  · Mild concentric left ventricular hypertrophy.  · Pulmonary hypertension present.  · Normal left ventricular systolic function. The estimated ejection fraction is 60%.  · Grade I (mild) left ventricular diastolic dysfunction consistent with impaired relaxation.  · No wall motion abnormalities.  · Normal right ventricular systolic function.  · Intermediate central venous pressure (8 mmHg).  · Trivial pericardial  effusion.  · There is a left pleural effusion.  · The estimated PA systolic pressure is 47 mmHg.

## 2020-03-25 LAB
ALBUMIN SERPL BCP-MCNC: 2.5 G/DL (ref 3.5–5.2)
ALP SERPL-CCNC: 323 U/L (ref 55–135)
ALT SERPL W/O P-5'-P-CCNC: 22 U/L (ref 10–44)
ANION GAP SERPL CALC-SCNC: 10 MMOL/L (ref 8–16)
AST SERPL-CCNC: 35 U/L (ref 10–40)
BILIRUB SERPL-MCNC: 0.8 MG/DL (ref 0.1–1)
BUN SERPL-MCNC: 33 MG/DL (ref 8–23)
CALCIUM SERPL-MCNC: 8.9 MG/DL (ref 8.7–10.5)
CHLORIDE SERPL-SCNC: 100 MMOL/L (ref 95–110)
CO2 SERPL-SCNC: 27 MMOL/L (ref 23–29)
CREAT SERPL-MCNC: 2.2 MG/DL (ref 0.5–1.4)
ERYTHROCYTE [DISTWIDTH] IN BLOOD BY AUTOMATED COUNT: 15.9 % (ref 11.5–14.5)
EST. GFR  (AFRICAN AMERICAN): 25.6 ML/MIN/1.73 M^2
EST. GFR  (NON AFRICAN AMERICAN): 22.2 ML/MIN/1.73 M^2
GLUCOSE SERPL-MCNC: 85 MG/DL (ref 70–110)
HCT VFR BLD AUTO: 26.5 % (ref 37–48.5)
HGB BLD-MCNC: 8.3 G/DL (ref 12–16)
MAGNESIUM SERPL-MCNC: 1.5 MG/DL (ref 1.6–2.6)
MCH RBC QN AUTO: 30.3 PG (ref 27–31)
MCHC RBC AUTO-ENTMCNC: 31.3 G/DL (ref 32–36)
MCV RBC AUTO: 97 FL (ref 82–98)
PHOSPHATE SERPL-MCNC: 4.8 MG/DL (ref 2.7–4.5)
PLATELET # BLD AUTO: 523 K/UL (ref 150–350)
PMV BLD AUTO: 10.6 FL (ref 9.2–12.9)
POTASSIUM SERPL-SCNC: 4.6 MMOL/L (ref 3.5–5.1)
PROCALCITONIN SERPL IA-MCNC: 1.04 NG/ML (ref 0–0.5)
PROT SERPL-MCNC: 6 G/DL (ref 6–8.4)
RBC # BLD AUTO: 2.74 M/UL (ref 4–5.4)
SODIUM SERPL-SCNC: 137 MMOL/L (ref 136–145)
WBC # BLD AUTO: 11.86 K/UL (ref 3.9–12.7)

## 2020-03-25 PROCEDURE — 25000003 PHARM REV CODE 250: Performed by: INTERNAL MEDICINE

## 2020-03-25 PROCEDURE — 83735 ASSAY OF MAGNESIUM: CPT

## 2020-03-25 PROCEDURE — 21400001 HC TELEMETRY ROOM

## 2020-03-25 PROCEDURE — 94761 N-INVAS EAR/PLS OXIMETRY MLT: CPT

## 2020-03-25 PROCEDURE — 84100 ASSAY OF PHOSPHORUS: CPT

## 2020-03-25 PROCEDURE — 80053 COMPREHEN METABOLIC PANEL: CPT

## 2020-03-25 PROCEDURE — 99232 SBSQ HOSP IP/OBS MODERATE 35: CPT | Mod: ,,, | Performed by: INTERNAL MEDICINE

## 2020-03-25 PROCEDURE — 27000221 HC OXYGEN, UP TO 24 HOURS

## 2020-03-25 PROCEDURE — 63600175 PHARM REV CODE 636 W HCPCS: Performed by: INTERNAL MEDICINE

## 2020-03-25 PROCEDURE — 99900035 HC TECH TIME PER 15 MIN (STAT)

## 2020-03-25 PROCEDURE — 63600175 PHARM REV CODE 636 W HCPCS: Performed by: UROLOGY

## 2020-03-25 PROCEDURE — 85027 COMPLETE CBC AUTOMATED: CPT

## 2020-03-25 PROCEDURE — 25000003 PHARM REV CODE 250: Performed by: STUDENT IN AN ORGANIZED HEALTH CARE EDUCATION/TRAINING PROGRAM

## 2020-03-25 PROCEDURE — 25000003 PHARM REV CODE 250: Performed by: UROLOGY

## 2020-03-25 PROCEDURE — 94640 AIRWAY INHALATION TREATMENT: CPT

## 2020-03-25 PROCEDURE — 99232 PR SUBSEQUENT HOSPITAL CARE,LEVL II: ICD-10-PCS | Mod: ,,, | Performed by: INTERNAL MEDICINE

## 2020-03-25 PROCEDURE — 84145 PROCALCITONIN (PCT): CPT

## 2020-03-25 PROCEDURE — 25000003 PHARM REV CODE 250: Performed by: FAMILY MEDICINE

## 2020-03-25 PROCEDURE — 25000003 PHARM REV CODE 250: Performed by: HOSPITALIST

## 2020-03-25 PROCEDURE — 25000242 PHARM REV CODE 250 ALT 637 W/ HCPCS: Performed by: INTERNAL MEDICINE

## 2020-03-25 RX ORDER — CEFEPIME HYDROCHLORIDE 1 G/50ML
1 INJECTION, SOLUTION INTRAVENOUS
Status: DISCONTINUED | OUTPATIENT
Start: 2020-03-25 | End: 2020-03-27 | Stop reason: HOSPADM

## 2020-03-25 RX ORDER — HYDROMORPHONE HYDROCHLORIDE 1 MG/ML
0.5 INJECTION, SOLUTION INTRAMUSCULAR; INTRAVENOUS; SUBCUTANEOUS EVERY 6 HOURS PRN
Status: DISCONTINUED | OUTPATIENT
Start: 2020-03-25 | End: 2020-03-27 | Stop reason: HOSPADM

## 2020-03-25 RX ORDER — ALBUTEROL SULFATE 90 UG/1
2 AEROSOL, METERED RESPIRATORY (INHALATION) EVERY 6 HOURS PRN
Status: DISCONTINUED | OUTPATIENT
Start: 2020-03-25 | End: 2020-03-27 | Stop reason: HOSPADM

## 2020-03-25 RX ORDER — OXYBUTYNIN CHLORIDE 10 MG/1
10 TABLET, EXTENDED RELEASE ORAL DAILY
Status: DISCONTINUED | OUTPATIENT
Start: 2020-03-25 | End: 2020-03-26

## 2020-03-25 RX ORDER — BRIMONIDINE TARTRATE 1.5 MG/ML
1 SOLUTION/ DROPS OPHTHALMIC 3 TIMES DAILY
Status: DISCONTINUED | OUTPATIENT
Start: 2020-03-25 | End: 2020-03-27 | Stop reason: HOSPADM

## 2020-03-25 RX ORDER — AZITHROMYCIN 250 MG/1
500 TABLET, FILM COATED ORAL DAILY
Status: DISCONTINUED | OUTPATIENT
Start: 2020-03-25 | End: 2020-03-26

## 2020-03-25 RX ORDER — MORPHINE SULFATE 2 MG/ML
1 INJECTION, SOLUTION INTRAMUSCULAR; INTRAVENOUS ONCE
Status: DISCONTINUED | OUTPATIENT
Start: 2020-03-25 | End: 2020-03-25

## 2020-03-25 RX ORDER — HYDROCODONE BITARTRATE AND ACETAMINOPHEN 5; 325 MG/1; MG/1
1 TABLET ORAL EVERY 6 HOURS PRN
Status: DISCONTINUED | OUTPATIENT
Start: 2020-03-25 | End: 2020-03-26

## 2020-03-25 RX ORDER — FLUTICASONE PROPIONATE 50 MCG
2 SPRAY, SUSPENSION (ML) NASAL DAILY
Status: DISCONTINUED | OUTPATIENT
Start: 2020-03-25 | End: 2020-03-27 | Stop reason: HOSPADM

## 2020-03-25 RX ORDER — PANTOPRAZOLE SODIUM 40 MG/1
40 TABLET, DELAYED RELEASE ORAL 2 TIMES DAILY
Status: DISCONTINUED | OUTPATIENT
Start: 2020-03-25 | End: 2020-03-27 | Stop reason: HOSPADM

## 2020-03-25 RX ADMIN — CLOTRIMAZOLE 10 MG: 10 LOZENGE ORAL; TOPICAL at 08:03

## 2020-03-25 RX ADMIN — DIPHENHYDRAMINE HYDROCHLORIDE 6.25 MG: 50 INJECTION, SOLUTION INTRAMUSCULAR; INTRAVENOUS at 02:03

## 2020-03-25 RX ADMIN — DRONABINOL 2.5 MG: 2.5 CAPSULE ORAL at 04:03

## 2020-03-25 RX ADMIN — CLOTRIMAZOLE 10 MG: 10 LOZENGE ORAL; TOPICAL at 04:03

## 2020-03-25 RX ADMIN — CLOTRIMAZOLE 10 MG: 10 LOZENGE ORAL; TOPICAL at 12:03

## 2020-03-25 RX ADMIN — HYDROMORPHONE HYDROCHLORIDE: 1 INJECTION, SOLUTION INTRAMUSCULAR; INTRAVENOUS; SUBCUTANEOUS at 10:03

## 2020-03-25 RX ADMIN — MAGNESIUM OXIDE 400 MG: 400 TABLET ORAL at 08:03

## 2020-03-25 RX ADMIN — CEFEPIME HYDROCHLORIDE 1 G: 1 INJECTION, SOLUTION INTRAVENOUS at 09:03

## 2020-03-25 RX ADMIN — PSYLLIUM HUSK 2 PACKET: 3.4 POWDER ORAL at 08:03

## 2020-03-25 RX ADMIN — DIPHENOXYLATE HYDROCHLORIDE AND ATROPINE SULFATE 1 TABLET: 2.5; .025 TABLET ORAL at 12:03

## 2020-03-25 RX ADMIN — BRIMONIDINE TARTRATE 1 DROP: 1.5 SOLUTION OPHTHALMIC at 04:03

## 2020-03-25 RX ADMIN — DIPHENOXYLATE HYDROCHLORIDE AND ATROPINE SULFATE 1 TABLET: 2.5; .025 TABLET ORAL at 08:03

## 2020-03-25 RX ADMIN — DIPHENHYDRAMINE HYDROCHLORIDE 6.25 MG: 50 INJECTION, SOLUTION INTRAMUSCULAR; INTRAVENOUS at 04:03

## 2020-03-25 RX ADMIN — ALBUTEROL SULFATE 2 PUFF: 90 AEROSOL, METERED RESPIRATORY (INHALATION) at 07:03

## 2020-03-25 RX ADMIN — DRONABINOL 2.5 MG: 2.5 CAPSULE ORAL at 05:03

## 2020-03-25 RX ADMIN — CYCLOBENZAPRINE HYDROCHLORIDE 5 MG: 5 TABLET, FILM COATED ORAL at 07:03

## 2020-03-25 RX ADMIN — HYDROCODONE BITARTRATE AND ACETAMINOPHEN 1 TABLET: 5; 325 TABLET ORAL at 02:03

## 2020-03-25 RX ADMIN — PANTOPRAZOLE SODIUM 40 MG: 40 TABLET, DELAYED RELEASE ORAL at 05:03

## 2020-03-25 RX ADMIN — CEFEPIME HYDROCHLORIDE 1 G: 1 INJECTION, SOLUTION INTRAVENOUS at 11:03

## 2020-03-25 RX ADMIN — DIPHENOXYLATE HYDROCHLORIDE AND ATROPINE SULFATE 1 TABLET: 2.5; .025 TABLET ORAL at 04:03

## 2020-03-25 RX ADMIN — BRIMONIDINE TARTRATE 1 DROP: 1.5 SOLUTION OPHTHALMIC at 12:03

## 2020-03-25 RX ADMIN — FLUTICASONE PROPIONATE 100 MCG: 50 SPRAY, METERED NASAL at 12:03

## 2020-03-25 RX ADMIN — OXYBUTYNIN CHLORIDE 10 MG: 10 TABLET, EXTENDED RELEASE ORAL at 12:03

## 2020-03-25 RX ADMIN — HYOSCYAMINE SULFATE 0.12 MG: 0.12 TABLET ORAL at 06:03

## 2020-03-25 RX ADMIN — HYDROMORPHONE HYDROCHLORIDE 0.5 MG: 1 INJECTION, SOLUTION INTRAMUSCULAR; INTRAVENOUS; SUBCUTANEOUS at 01:03

## 2020-03-25 RX ADMIN — BRIMONIDINE TARTRATE 1 DROP: 1.5 SOLUTION OPHTHALMIC at 09:03

## 2020-03-25 RX ADMIN — HYOSCYAMINE SULFATE 0.12 MG: 0.12 TABLET ORAL at 12:03

## 2020-03-25 RX ADMIN — AZITHROMYCIN 500 MG: 250 TABLET, FILM COATED ORAL at 08:03

## 2020-03-25 RX ADMIN — SERTRALINE HYDROCHLORIDE 50 MG: 50 TABLET ORAL at 08:03

## 2020-03-25 RX ADMIN — HYDROCODONE BITARTRATE AND ACETAMINOPHEN 1 TABLET: 5; 325 TABLET ORAL at 08:03

## 2020-03-25 RX ADMIN — LEVOTHYROXINE SODIUM 88 MCG: 88 TABLET ORAL at 05:03

## 2020-03-25 RX ADMIN — HYDROMORPHONE HYDROCHLORIDE 0.5 MG: 1 INJECTION, SOLUTION INTRAMUSCULAR; INTRAVENOUS; SUBCUTANEOUS at 08:03

## 2020-03-25 RX ADMIN — HYOSCYAMINE SULFATE 0.12 MG: 0.12 TABLET ORAL at 08:03

## 2020-03-25 RX ADMIN — PSYLLIUM HUSK 2 PACKET: 3.4 POWDER ORAL at 04:03

## 2020-03-25 NOTE — PROGRESS NOTES
[facility]  Progress Note    Patient Name: Shara Hutchins  MRN: [id number]  Patient Class: IP- Inpatient   Admission Date: 3/7/2020  Length of Stay: 16 days  Attending Physician: Aleksander Phan MD  Primary Care Provider: April Horton MD        Subjective:     Principal Problem:Acute hypoxemic respiratory failure    Interval History:   Patient was seen and examined  Patient is having bladder spasms.  Urine culture with Klebsiella and Enterococcus  Bladder scan with only 40 cc.  Urine is getting clear.  Review of Systems  Objective:     Vital Signs (Most Recent):  Temp: 98.3 °F (36.8 °C) (03/25/20 1142)  Pulse: 85 (03/25/20 1142)  Resp: 20 (03/25/20 1142)  BP: 104/60 (03/25/20 1142)  SpO2: 96 % (03/25/20 1142) Vital Signs (24h Range):  Temp:  [98.3 °F (36.8 °C)-98.6 °F (37 °C)] 98.3 °F (36.8 °C)  Pulse:  [80-93] 85  Resp:  [18-20] 20  SpO2:  [95 %-99 %] 96 %  BP: ()/(51-60) 104/60     Weight: 59.6 kg (131 lb 6.3 oz)  Body mass index is 20.28 kg/m².    Intake/Output Summary (Last 24 hours) at 3/25/2020 1437  Last data filed at 3/25/2020 1427  Gross per 24 hour   Intake 97762 ml   Output 69175 ml   Net -56433 ml      Physical Exam      Overview/Hospital Course:     Significant Labs:   BMP:   Recent Labs   Lab 03/25/20  0505   GLU 85      K 4.6      CO2 27   BUN 33*   CREATININE 2.2*   CALCIUM 8.9   MG 1.5*     CBC:   Recent Labs   Lab 03/24/20  0432 03/25/20  0505   WBC 9.45 11.86   HGB 8.4* 8.3*   HCT 26.5* 26.5*   * 523*       Significant Imaging: I have reviewed all pertinent imaging results/findings within the past 24 hours.    Physical Exam:  General- Patient alert and oriented x3 in NAD  HEENT- PERRLA, EOMI, OP clear, MMM  Neck- No JVD  CV- Regular rate and rhythm, No Murmur/jackie/rubs  Resp- Lungs clear  GI- Non tender/non-distended, BS normoactive   Extrem- No cyanosis, clubbing  Neuro- awake and alert   abdomen : soft ,  Mold tender SP area      Assessment/Plan:       High output ileostomy secondary to previous history of bowel surgery with short bowel syndrome /history of inflammatory bowel disease :improved.     Suprapubic area tenderness most likely from UTI (+ repeat UC)/bladder spasms        Acute hypoxemic respiratory failure at admission , resolved .    New onset hematuria with previous history of UTI with KLB pneumonia s/p completed Rx with rocephin. unsure cause currently  . U caht insitu          DARCI with Stage 5 chronic kidney disease not on chronic dialysis:back to base line         Pulmonary hypertension        Malnutrition        Debility         Unstageable pressure injury of skin and tissue     S/P ileostomy        Chronic diarrhea history      Iron deficiency anemia due to chronic blood loss        Inflammatory bowel disease        DNR (do not resuscitate)    PLAN   Continue saline irrigation   cefepime  Patient is to discharge back to skilled nursing facility when hematuria resolve possible tomorrow   May need IV antibiotics   Still hold  lovenox  Reversal of ileostomy as per surgery at later date   Hold torsemide  for the time being , follow renal function Hold IV lasix PRN  too.    Will DC azithromycin tomorrow  , will complete Rx.  Ditropan   Bladder and renal US     Active Diagnoses:    Diagnosis Date Noted POA    PRINCIPAL PROBLEM:  Acute hypoxemic respiratory failure [J96.01] 03/18/2020 No    Pulmonary hypertension [I27.20] 03/20/2020 Unknown    Malnutrition [E46] 03/07/2020 Yes    Debility [R53.81] 03/07/2020 Yes    Unstageable pressure injury of skin and tissue [L89.95] 03/06/2020 Yes    DNR (do not resuscitate) [Z66] 02/24/2020 Yes    S/P ileostomy [Z93.2] 02/13/2020 Not Applicable    Chronic diarrhea [K52.9] 02/10/2020 Yes     Chronic    Anemia of chronic disease [D63.8] 02/10/2020 Yes     Chronic    Stage 5 chronic kidney disease not on chronic dialysis [N18.5] 11/04/2018 Yes     Chronic    Iron deficiency anemia  due to chronic blood loss [D50.0] 01/07/2018 Yes     Chronic    Inflammatory bowel disease [K52.9] 01/07/2018 Yes     Chronic      Problems Resolved During this Admission:    Diagnosis Date Noted Date Resolved POA    Thrombocytopenia, unspecified [D69.6] 03/07/2020 03/18/2020 Yes    Acute renal failure superimposed on stage 5 chronic kidney disease, not on chronic dialysis [N17.9, N18.5] 02/10/2020 03/18/2020 Yes     VTE Risk Mitigation (From admission, onward)         Ordered     Place sequential compression device  Until discontinued      03/08/20 0133                   Aleksander Phan MD  Department of Hospital Medicine   [facility]

## 2020-03-25 NOTE — PLAN OF CARE
03/25/20 1453   Discharge Reassessment   Assessment Type Discharge Planning Reassessment   Anticipated Discharge Disposition LTAC   Post-Acute Status   Post-Acute Authorization Placement   Post-Acute Placement Status Awaiting Internal Medical Clearance  (Per Dr. Phan, pt may d/c tomorrow if hematuria is improved.)     [facility] has authorized LTAC stay at [facility] [place], auth # [id number]. [facility] sent Eufemia updated chest xray per her request. [facility] is aware that pt may d/c tomorrow. [facility] to continue to follow.

## 2020-03-25 NOTE — PROGRESS NOTES
[facility]  Pulmonology  Progress Note    Patient Name: Shara Hutchins  MRN: [id number]  Admission Date: 3/7/2020  Hospital Length of Stay: 16 days  Code Status: DNR  Attending Provider: Aleksander Phan MD  Primary Care Provider: April Horton MD   Principal Problem: Acute hypoxemic respiratory failure    Subjective:     Interval History:     3/19/2020 - Stable overnight, no new issues reported.  Weight is up 2 kg from admit but that weight is up about 10 kg from last hospital stay.  No new respiratory complaints, remains dyspneic and on NC O2.chest X-ray yesterday was unchanged.  I/O negative 2.2 liters.  Had atrial fibrillation last night and is back in NSR on cardizem.    3/20/2020 - Feels beter today, on lasix drip.  Weight decreased 2.6 kg, I/O negative 6.3 liters yesterday.  No new complaints.    3/21/2020 - Continnues to feel better, no neew complaints.  TPN has been stopped and she has started eating.  Is about 12 liters negative over the last 2 days    3/22/2020 - Stable overnight, no new complaints, she tells me that they are working on transfer to a rehab hospital. I/O 16.3 liters negative over last 3 days, no new weight today    3/23/2020 - stable overnight, continues to feel better , about 19-20 liters negative by I/O and visibly better.  Waiting on transfer to rehab.  O2 has decreased.    3/24/2020 - Stable overnight, no new issues reported.  Has blood and clots in urine. (being evaluated)    3/25/2020 - Stable overnight, no new complaints but does have some eye itching and nasal congestion.  Is getting bladder irrigation (seen by urology).  Respiratory status is good, bP a little low (? A little dry now).  Weight p 4 kg (? If related to ongoing bladder irrigation) - follow     Review of Systems   Constitutional: Positive for malaise/fatigue and weight loss. Negative for chills, diaphoresis and fever.   HENT: Negative for congestion.    Eyes: Negative for pain.   Respiratory: Positive for  cough and shortness of breath. Negative for hemoptysis, sputum production, wheezing and stridor.    Cardiovascular: Positive for orthopnea and leg swelling. Negative for chest pain, palpitations, claudication and PND.   Gastrointestinal: Positive for diarrhea. Negative for abdominal pain, constipation, heartburn, nausea and vomiting.        Ostomy   Genitourinary: Negative for dysuria, frequency, hematuria and urgency.   Musculoskeletal: Negative for falls and myalgias.   Neurological: Positive for weakness (generalized). Negative for sensory change and focal weakness.   Psychiatric/Behavioral: Negative for depression and suicidal ideas. The patient is not nervous/anxious.          Objective:     Vital Signs (Most Recent):  Temp: 98.5 °F (36.9 °C) (03/25/20 0707)  Pulse: 85 (03/25/20 0746)  Resp: 18 (03/25/20 0746)  BP: (!) 87/54 (03/25/20 0707)  SpO2: 99 % (03/25/20 0746) Vital Signs (24h Range):  Temp:  [98.3 °F (36.8 °C)-98.6 °F (37 °C)] 98.5 °F (36.9 °C)  Pulse:  [72-93] 85  Resp:  [18-20] 18  SpO2:  [95 %-100 %] 99 %  BP: ()/(51-56) 87/54     Weight: 59.6 kg (131 lb 6.3 oz)  Body mass index is 20.28 kg/m².      Intake/Output Summary (Last 24 hours) at 3/25/2020 0921  Last data filed at 3/25/2020 0836  Gross per 24 hour   Intake 28461 ml   Output 32920 ml   Net -6540 ml       Physical Exam   Constitutional: She is oriented to person, place, and time. She appears well-developed and well-nourished. No distress.   Chronically ill female, nad  Sitting in bed   HENT:   Head: Normocephalic and atraumatic.   Right Ear: External ear normal.   Left Ear: External ear normal.   Nose: Nose normal.   Mouth/Throat: Oropharynx is clear and moist.   Eyes: Pupils are equal, round, and reactive to light. Conjunctivae and EOM are normal.   Neck: Normal range of motion. Neck supple. No JVD present. No tracheal deviation present. No thyromegaly present.   Cardiovascular: Normal rate, regular rhythm, normal heart sounds and  intact distal pulses. Exam reveals no gallop and no friction rub.   No murmur heard.  Pulmonary/Chest: Effort normal. No stridor. No respiratory distress. She has no wheezes. She has rales (decreased). She exhibits no tenderness.   Decreased at bases but better   Abdominal: Soft. Bowel sounds are normal. She exhibits no distension. There is no tenderness. There is no rebound and no guarding.   + ostomy   Musculoskeletal: Normal range of motion. She exhibits edema (in dependent areas). She exhibits no tenderness.   Lymphadenopathy:     She has no cervical adenopathy.   Neurological: She is alert and oriented to person, place, and time. No cranial nerve deficit.   Skin: Skin is warm and dry. She is not diaphoretic.   Psychiatric: She has a normal mood and affect. Her behavior is normal.   Nursing note and vitals reviewed.      Vents:  Oxygen Concentration (%): 45 (03/20/20 0702)    Lines/Drains/Airways     Peripherally Inserted Central Catheter Line            PICC Double Lumen 03/06/20 1154 other (see comments) 18 days          Drain                 Hemodialysis AV Fistula Left upper arm -- days         Ileostomy 02/13/20 1600 Loop RLQ 40 days         Urethral Catheter 03/16/20 2230 Straight-tip 18 Fr. 8 days                Significant Labs:    CBC/Anemia Profile:  Recent Labs   Lab 03/24/20  0432 03/25/20  0505   WBC 9.45 11.86   HGB 8.4* 8.3*   HCT 26.5* 26.5*   * 523*   MCV 95 97   RDW 16.6* 15.9*        Chemistries:  Recent Labs   Lab 03/24/20  0432 03/25/20  0505    137   K 4.5 4.6    100   CO2 26 27   BUN 35* 33*   CREATININE 2.1* 2.2*   CALCIUM 8.7 8.9   ALBUMIN 2.3* 2.5*   PROT 5.6* 6.0   BILITOT 1.1* 0.8   ALKPHOS 310* 323*   ALT 22 22   AST 36 35   MG 1.6 1.5*   PHOS 5.6* 4.8*       All pertinent labs within the past 24 hours have been reviewed.    BNP - 1502    Microbiology Results (last 7 days)     Procedure Component Value Units Date/Time    Urine Culture High Risk [[id number]]   (Abnormal) Collected:  03/24/20 2901    Order Status:  Completed Specimen:  Urine, Catheterized Updated:  03/25/20 0920     Urine Culture, Routine PRESUMPTIVE PSEUDOMONAS SPECIES  >100,000 cfu/ml  Identification and susceptibility pending        ENTEROCOCCUS SPECIES  > 100,000 cfu/ml  Identification and susceptibility pending      Narrative:       Indicated criteria for high risk culture:->Prior to urologic  procedures            Significant Imaging:  I have reviewed and interpreted all pertinent imaging results/findings within the past 24 hours.     CXR (3/18) - about the same, bilateral infiltrates c/w fluid  CXR (3/20) - about the same    ECHO (3/19)  · Mild concentric left ventricular hypertrophy.  · Pulmonary hypertension present.  · Normal left ventricular systolic function. The estimated ejection fraction is 60%.  · Grade I (mild) left ventricular diastolic dysfunction consistent with impaired relaxation.  · No wall motion abnormalities.  · Normal right ventricular systolic function.  · Intermediate central venous pressure (8 mmHg).  · Trivial pericardial effusion.  · There is a left pleural effusion.  · The estimated PA systolic pressure is 47 mmHg.    Assessment/Plan:     * Acute hypoxemic respiratory failure  · Better, continue to wean O2    Pulmonary hypertension  · I suspect group 2 (diastolic dysfunction)  · Follow, no acute treatment needed    Malnutrition  · Fairly severe, follow  · TPN stopped and oral diet increased    Debility  · PT, OT as able    Unstageable pressure injury of skin and tissue  · Continue with local care    DNR (do not resuscitate)  · Aware     S/P ileostomy  · Aware     Chronic diarrhea  · S/p ostomy  · Better overall     Stage 5 chronic kidney disease not on chronic dialysis  · Aware   · Creatinine stable today but still up some (proabably a little dry, follow)    Iron deficiency anemia due to chronic blood loss  · Aware, has some blood in urine - being addressed  · Transfuse for  hgb < 7    Inflammatory bowel disease  · Aware            Moo Maharaj MD  Pulmonology  [facility]

## 2020-03-25 NOTE — PLAN OF CARE
03/24/20 1928   Patient Assessment/Suction   Level of Consciousness (AVPU) alert   Respiratory Effort Normal;Unlabored   Expansion/Accessory Muscles/Retractions no use of accessory muscles   All Lung Fields Breath Sounds Anterior:;clear   Rhythm/Pattern, Respiratory unlabored   Cough Frequency no cough   PRE-TX-O2   O2 Device (Oxygen Therapy) nasal cannula   $ Is the patient on Low Flow Oxygen? Yes   Flow (L/min) 2   SpO2 96 %   Pulse Oximetry Type Intermittent   $ Pulse Oximetry - Multiple Charge Pulse Oximetry - Multiple   Pulse 81   Resp 18   Inhaler   $ Inhaler Charges MDI (Metered Dose Inahler) Treatment;Given With Spacer   Daily Review of Necessity (Inhaler) completed   Respiratory Treatment Status (Inhaler) given   Treatment Route (Inhaler) mouthpiece;spacer/holding chamber   Patient Position (Inhaler) HOB elevated   Post Treatment Assessment (Inhaler) breath sounds unchanged   Signs of Intolerance (Inhaler) none   Breath Sounds Post-Respiratory Treatment   Throughout All Fields Post-Treatment All Fields   Throughout All Fields Post-Treatment no change   Post-treatment Heart Rate (beats/min) 81   Post-treatment Resp Rate (breaths/min) 18   Respiratory Evaluation   $ Care Plan Tech Time 15 min

## 2020-03-25 NOTE — PLAN OF CARE
03/25/20 0746   Patient Assessment/Suction   Level of Consciousness (AVPU) alert   Respiratory Effort Normal;Unlabored   PRE-TX-O2   O2 Device (Oxygen Therapy) nasal cannula   $ Is the patient on Low Flow Oxygen? Yes   Flow (L/min) 2   SpO2 99 %   Pulse Oximetry Type Intermittent   $ Pulse Oximetry - Multiple Charge Pulse Oximetry - Multiple   Pulse 85   Resp 18   Inhaler   $ Inhaler Charges MDI (Metered Dose Inahler) Treatment   Daily Review of Necessity (Inhaler) completed   Respiratory Treatment Status (Inhaler) given   Treatment Route (Inhaler) mouthpiece;spacer/holding chamber   Patient Position (Inhaler) HOB elevated   Post Treatment Assessment (Inhaler) breath sounds improved   Respiratory Evaluation   $ Care Plan Tech Time 15 min

## 2020-03-25 NOTE — PROGRESS NOTES
"[facility]  Adult Nutrition   Progress Note (Follow-Up)    SUMMARY     Recommendations/Interventions:    Recommendation/Intervention: 1. Continue current diet as tolerated. 2. Honor food preferences and continue to encourage adequate intake of meals.  Goals: 1. Patient to tolerated diet and meet at least 75% of estimated needs via PO intake of meals.  Nutrition Goal Status: progressing towards goal    Dietitian Rounds Brief:  · Seen 2' f/u. TPN stopped on 3/22.Patients appetite and intake greatly improving. Per MD notes, ostomy output improving. Hemoglobin stable. Per urology: "Suspect she has some large clots in the bladder that will break up and clog catheter over time.Continue CBI and titrate to light pink then off if urine remains clear" Patient complains of nasal congestion. Will continue to monitor intake/output, labs, and plan of care.  Reason for Assessment  Reason For Assessment: RD follow-up  Diagnosis: (Malnutrition/Malabsorption/ High ostomy output )  Relevant Medical History: CKD stage 3, T2DM, HTN, iron deficiency anemia, vulvar cancer  Interdisciplinary Rounds: attended    Nutrition Risk Screen  Nutrition Risk Screen: no indicators present       Ileostomy 02/13/20 1600 Loop RLQ-Wound Image: Images linked       Wound 02/23/20 2000 Moisture associated dermatitis medial Perineum #1-Wound Image: Images linked       Pressure Injury 02/27/20 1837 Left Buttocks Stage 2-Wound Image: Images linked  MST Score: 0  Have you recently lost weight without trying?: No(not since coming to hospital)  Weight loss score: 0  Have you been eating poorly because of a decreased appetite?: No  Appetite score: 0     Nutrition/Diet History  Spiritual, Cultural Beliefs, Orthodoxy Practices, Values that Affect Care: no  Food Allergies: NKFA  Factors Affecting Nutritional Intake: malabsorption, altered gastrointestinal function  Nutrition Support Formula Prior to Admit: Other (see " "comments)(none)    Anthropometrics  Temp: 98.5 °F (36.9 °C)  Height Method: Stated  Height: 5' 7.5" (171.5 cm)  Height (inches): 67.5 in  Weight Method: Bed Scale  Weight: 59.6 kg (131 lb 6.3 oz)  Weight (lb): 131.4 lb  Ideal Body Weight (IBW), Female: 137.5 lb  % Ideal Body Weight, Female (lb): 101.81 %  BMI (Calculated): 20.3  BMI Grade: 18.5-24.9 - normal     Weight History:  Wt Readings from Last 10 Encounters:   03/25/20 59.6 kg (131 lb 6.3 oz)   03/06/20 53 kg (116 lb 13.5 oz)   03/07/20 52.6 kg (116 lb)   03/03/20 53 kg (116 lb 13.5 oz)   02/19/20 58.7 kg (129 lb 4.8 oz)   02/19/20 58.8 kg (129 lb 9.6 oz)   02/14/20 56.7 kg (125 lb)   02/05/20 56.9 kg (125 lb 6.4 oz)   01/22/20 57.2 kg (126 lb)   01/08/20 61.4 kg (135 lb 6.4 oz)     Lab/Procedures/Meds: Pertinent Labs Reviewed  Clinical Chemistry:  Recent Labs   Lab 03/23/20  0449 03/24/20  0432 03/25/20  0505    138 137   K 4.8 4.5 4.6    101 100   CO2 28 26 27   GLU 79 81 85   BUN 36* 35* 33*   CREATININE 1.8* 2.1* 2.2*   CALCIUM 8.6* 8.7 8.9   PROT 5.5* 5.6* 6.0   ALBUMIN 2.1* 2.3* 2.5*   BILITOT 1.2* 1.1* 0.8   ALKPHOS 301* 310* 323*   AST 40 36 35   ALT 19 22 22   ANIONGAP 5* 11 10   ESTGFRAFRICA 32.6* 27.1* 25.6*   EGFRNONAA 28.3* 23.5* 22.2*   MG 1.9 1.6 1.5*   PHOS 4.9* 5.6* 4.8*     CBC:   Recent Labs   Lab 03/25/20  0505   WBC 11.86   RBC 2.74*   HGB 8.3*   HCT 26.5*   *   MCV 97   MCH 30.3   MCHC 31.3*     Lipid Panel:  Recent Labs   Lab 03/23/20  0449   TRIG 105     Cardiac Profile:  Recent Labs   Lab 03/19/20  0425 03/20/20  1027   BNP 1,253* 1,502*     Inflammatory Labs:  Recent Labs   Lab 03/19/20  1735   CRP 25.73*     Medications: Pertinent Medications reviewed  Scheduled Meds:   azithromycin  500 mg Oral Daily    brimonidine 0.15 % OPTH DROP  1 drop Both Eyes TID    ceFEPime (MAXIPIME) IVPB  1 g Intravenous Q12H    clotrimazole  10 mg Oral QID    diphenoxylate-atropine 2.5-0.025 mg  1 tablet Oral QID    dronabinoL  " 2.5 mg Oral BID AC    epoetin nica-ebpx (RETACRIT) injection  100 Units/kg Subcutaneous Q7 Days    fluticasone propionate  2 spray Each Nostril Daily    HYDROmorphone  0.5 mg Intravenous Once    levothyroxine  88 mcg Oral Before breakfast    magnesium oxide  400 mg Oral BID    morphine  1 mg Intravenous Once    pantoprazole  40 mg Oral BID    psyllium husk (with sugar)  2 packet Oral TID    sertraline  50 mg Oral Daily    sodium chloride  1 spray Each Nostril Daily     Continuous Infusions:   dextrose 10 % in water (D10W)       PRN Meds:.sodium chloride, acetaminophen, albuterol **AND** [CANCELED] MDI TID WAKE, benzocaine, butalbital-acetaminophen-caffeine -40 mg, calcium chloride IVPB, calcium chloride IVPB, calcium chloride IVPB, cyclobenzaprine, dextrose 10 % in water (D10W), dextrose 50%, dextrose 50%, diphenhydrAMINE, glucagon (human recombinant), glucose, glucose, HYDROcodone-acetaminophen, hyoscyamine, magnesium oxide, magnesium sulfate IVPB, magnesium sulfate IVPB, magnesium sulfate IVPB, magnesium sulfate IVPB, melatonin, ondansetron, potassium chloride in water, potassium chloride in water, potassium chloride in water, potassium chloride in water, potassium chloride, potassium chloride, potassium chloride, potassium chloride, sodium chloride 0.9%, sodium phosphate IVPB, sodium phosphate IVPB, sodium phosphate IVPB, sodium phosphate IVPB, sodium phosphate IVPB    Estimated/Assessed Needs    Weight Used For Calorie Calculations: 66 kg (145 lb 8.1 oz)  Energy Calorie Requirements (kcal): 5840-8123 kcals/day (30-35 kcals/kg)  Energy Need Method: Kcal/kg  Protein Requirements:  g/day (1.5-2.0 g/kg)  Weight Used For Protein Calculations: 66 kg (145 lb 8.1 oz)     Estimated Fluid Requirement Method: RDA Method    Nutrition Prescription Ordered    Current Diet Order: Regular Diet   Nutrition Order Comments: no concentrated sweets or juices    Evaluation of Received Nutrient/Fluid  Intake    Energy Calories Required: not meeting needs  Protein Required: not meeting needs  Fluid Required: meeting needs  Tolerance: tolerating  % Intake of Estimated Energy Needs: 75 - 100 %  % Meal Intake: 75 - 100 %    Intake/Output Summary (Last 24 hours) at 3/25/2020 0951  Last data filed at 3/25/2020 0932  Gross per 24 hour   Intake 68757 ml   Output 92672 ml   Net -6480 ml      Nutrition Risk    Level of Risk/Frequency of Follow-up: high   Monitor and Evaluation    Food and Nutrient Intake: energy intake, food and beverage intake  Food and Nutrient Adminstration: diet order  Physical Activity and Function: nutrition-related ADLs and IADLs, factors affecting access to physical activity  Anthropometric Measurements: weight, weight change  Biochemical Data, Medical Tests and Procedures: electrolyte and renal panel, inflammatory profile, gastrointestinal profile, lipid profile, glucose/endocrine profile  Nutrition-Focused Physical Findings: overall appearance     Nutrition Follow-Up    RD Follow-up?: Yes  Kitty Blanco RD 03/25/2020 9:58 AM

## 2020-03-25 NOTE — PT/OT/SLP PROGRESS
Occupational Therapy      Patient Name:  Shara Hutchins   MRN:  [id number]    Patient not seen today; refusal 2/2 pain in lower abdomen; will follow-up next service date.    Alejandro Varghese, OT  3/25/2020

## 2020-03-25 NOTE — PROGRESS NOTES
Pharmacist Intervention IV to PO Note    Shara Hutchins is a 69 y.o. female being treated with IV medication azithromycin and Pantoprazole    Patient Data:    Vital Signs (Most Recent):  Temp: 98.6 °F (37 °C) (03/25/20 0015)  Pulse: 82 (03/25/20 0015)  Resp: 18 (03/25/20 0015)  BP: (!) 91/53 (03/25/20 0015)  SpO2: 96 % (03/25/20 0015)   Vital Signs (72h Range):  Temp:  [97.7 °F (36.5 °C)-98.7 °F (37.1 °C)]   Pulse:  []   Resp:  [17-44]   BP: ()/(51-78)   SpO2:  [89 %-100 %]      CBC:  Recent Labs   Lab 03/22/20 0342 03/23/20 0449 03/24/20 0432   WBC 10.15 10.42 9.45   RBC 2.32* 2.82* 2.78*   HGB 7.0* 8.5* 8.4*   HCT 22.5* 26.5* 26.5*   * 456* 529*   MCV 97 94 95   MCH 30.2 30.1 30.2   MCHC 31.1* 32.1 31.7*     CMP:     Recent Labs   Lab 03/22/20 0342 03/23/20 0449 03/24/20 0432   * 79 81   CALCIUM 8.1* 8.6* 8.7   ALBUMIN 2.0* 2.1* 2.3*   PROT 5.3* 5.5* 5.6*    136 138   K 4.0 4.8 4.5   CO2 27 28 26    103 101   BUN 36* 36* 35*   CREATININE 1.8* 1.8* 2.1*   ALKPHOS 211* 301* 310*   ALT 16 19 22   AST 28 40 36   BILITOT 1.1* 1.2* 1.1*       Dietary Orders:  Diet Orders            Diet Adult Regular (IDDSI Level 7): Regular starting at 03/21 1046            Based on the following criteria, this patient qualifies for intravenous to oral conversion:  [x] The patients gastrointestinal tract is functioning (tolerating medications via oral or enteral route for 24 hours and tolerating food or enteral feeds for 24 hours).  [x] The patient is hemodynamically stable for 24 hours (heart rate <100 beats per minute, systolic blood pressure >99 mm Hg, and respiratory rate <20 breaths per minute).  [x] The patient shows clinical improvement (afebrile for at least 24 hours and white blood cell count downtrending or normalized). Additionally, the patient must be non-neutropenic (absolute neutrophil count >500 cells/mm3).  [x] For antimicrobials, the patient has received IV therapy for at  least 24 hours.    IV medication Azithromycin and Pantoprazole will be changed to oral medication Azithromycin and Pantoprazole    Pharmacist's Name: Tere Khan  Pharmacist's Extension: [phone]

## 2020-03-25 NOTE — PROGRESS NOTES
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 emergency department on 2/23/20 with abdominal pain, vaginal bleeding, DARCI  (3.8 from 2.5 baseline) and a 20 pound weight loss. Lee was placed on admission to monitor UOP. CTRSS 2/12 2/23 showed bilateral nonobstructing renal stones. She underwent loop ileostomy on 2/13/20 for chronic diarrhea. rbus on 2/26 showed no hydro, b renal stones and indeterminate artifact. She went to [facility] on 3/7 and returned and states gross hematuria started after this. Prior to admission pt denied having gross hematuria, urinary frequency, vaginal bleeding or frequent uti's. She did however state that her vulvar itching had returned and she was concerned about recurrence of cancer with no recent f/u by gyn onc. She has seen by  inpt and plan was for outpt f/u with .     She has been awaiting LTAC placement on 3/13  was consulted for persistent hematuria. His plan was for outpt cysto to eval for any bladder path not seen on ct and discussion of renal stones as well as discharging with lee. Her hematuria continued, ucx on 3/15 grew klebsiella and she was started on rocephin and completed a 5d course. Around 3/17  she was given a voiding trial vs lee exchange? on 3/17 for larger 18fr.      She continued to have hematuria requiring intermittent irrigation and 2 transfusions (last one 2d agao on 3/21/20). Urology was re-consulted 3/24, I asked them to remove the 18fr, place a 22 [place] 3 way, irrigate and start cbi but they have continued to have trouble with the cbi.       3/24 lee catheter changed to 24 fr 3 way and irrigated some small clots to light pink. C/o pain overnight in bladder. Pvr: 12cc, urine  light red in am and pt manipulating irrigation to keep urine light pink. Urine sent for culture and cytology.   3/25 cx grewing pseud and enterococcus. Pt starting cefepime       Objective:     Temp:  [98.3 °F (36.8 °C)-98.6 °F (37 °C)] 98.3 °F (36.8 °C)  Pulse:  [72-93] 85  Resp:  [18-20] 20  SpO2:  [95 %-99 %] 96 %  BP: ()/(51-60) 104/60  I/O last 3 completed shifts:  In: 45510 [P.O.:1920; Other:18354; IV Piggyback:250]  Out: 13605 [Urine:76484; Stool:4350]  I/O this shift:  In: 1680 [P.O.:1680]  Out: 6700 [Urine:5900; Stool:800]    Telephone review    Labs:     Recent Labs   Lab 03/23/20 0449 03/24/20  0432 03/25/20  0505    138 137   K 4.8 4.5 4.6    101 100   CO2 28 26 27   BUN 36* 35* 33*   CREATININE 1.8* 2.1* 2.2*   CALCIUM 8.6* 8.7 8.9     Recent Labs   Lab 03/23/20 0449 03/24/20  0432 03/25/20  0505   WBC 10.42 9.45 11.86   HGB 8.5* 8.4* 8.3*   HCT 26.5* 26.5* 26.5*   * 529* 523*       Radiology:  See hpi    Assessment:     Shara Hutchins is a 69 y.o. female with bilateral non obstructing stones and gross hematuria secondary to uti, lee trauma, possible radiation cystitis, bladder tumor or recurrent vulvar cancer.     Plan:     · C/o bladder pain and spasm likely secondary to uti and large catheter (pvr was 12)  cx growing pseodumonas and enterococcus, cefepime started today. Will see if pain improves with pain medicine, treatment of uti and ditropan.    · Continue CBI and titrate to light pink then off if urine remains clear.   · Since she continues to clot off can obtain a rbus (kidney bladder ultrasound) with full bladder to 1)ensure no hydro from any stones that could have dropped down to ureter and 2) eval if any large clot vs mass in bladder. eval for flow within bladder mass.   · If hematuria does not improve on cbi may need cystoscopic evaluation by  at some point or on call physician. Will need trial of CBI first.     Cannot be discharged on cbi   I will be  covering today and  starting thursday    Still needs f/u with  to eval for recurrence of vulvar cancer  Still has ileostomy planned     Gaby Garcia MD  [facility] (Didi/Terri/Jose/Sunil)   Office: [phone]

## 2020-03-25 NOTE — SUBJECTIVE & OBJECTIVE
Interval History:     3/19/2020 - Stable overnight, no new issues reported.  Weight is up 2 kg from admit but that weight is up about 10 kg from last hospital stay.  No new respiratory complaints, remains dyspneic and on NC O2.chest X-ray yesterday was unchanged.  I/O negative 2.2 liters.  Had atrial fibrillation last night and is back in NSR on cardizem.    3/20/2020 - Feels beter today, on lasix drip.  Weight decreased 2.6 kg, I/O negative 6.3 liters yesterday.  No new complaints.    3/21/2020 - Continnues to feel better, no neew complaints.  TPN has been stopped and she has started eating.  Is about 12 liters negative over the last 2 days    3/22/2020 - Stable overnight, no new complaints, she tells me that they are working on transfer to a rehab hospital. I/O 16.3 liters negative over last 3 days, no new weight today    3/23/2020 - stable overnight, continues to feel better , about 19-20 liters negative by I/O and visibly better.  Waiting on transfer to rehab.  O2 has decreased.    3/24/2020 - Stable overnight, no new issues reported.  Has blood and clots in urine. (being evaluated)    3/25/2020 - Stable overnight, no new complaints but does have some eye itching and nasal congestion.  Is getting bladder irrigation (seen by urology).  Respiratory status is good, bP a little low (? A little dry now).  Weight p 4 kg (? If related to ongoing bladder irrigation) - follow     Review of Systems   Constitutional: Positive for malaise/fatigue and weight loss. Negative for chills, diaphoresis and fever.   HENT: Negative for congestion.    Eyes: Negative for pain.   Respiratory: Positive for cough and shortness of breath. Negative for hemoptysis, sputum production, wheezing and stridor.    Cardiovascular: Positive for orthopnea and leg swelling. Negative for chest pain, palpitations, claudication and PND.   Gastrointestinal: Positive for diarrhea. Negative for abdominal pain, constipation, heartburn, nausea and vomiting.         Ostomy   Genitourinary: Negative for dysuria, frequency, hematuria and urgency.   Musculoskeletal: Negative for falls and myalgias.   Neurological: Positive for weakness (generalized). Negative for sensory change and focal weakness.   Psychiatric/Behavioral: Negative for depression and suicidal ideas. The patient is not nervous/anxious.          Objective:     Vital Signs (Most Recent):  Temp: 98.5 °F (36.9 °C) (03/25/20 0707)  Pulse: 85 (03/25/20 0746)  Resp: 18 (03/25/20 0746)  BP: (!) 87/54 (03/25/20 0707)  SpO2: 99 % (03/25/20 0746) Vital Signs (24h Range):  Temp:  [98.3 °F (36.8 °C)-98.6 °F (37 °C)] 98.5 °F (36.9 °C)  Pulse:  [72-93] 85  Resp:  [18-20] 18  SpO2:  [95 %-100 %] 99 %  BP: ()/(51-56) 87/54     Weight: 59.6 kg (131 lb 6.3 oz)  Body mass index is 20.28 kg/m².      Intake/Output Summary (Last 24 hours) at 3/25/2020 0921  Last data filed at 3/25/2020 0836  Gross per 24 hour   Intake 18205 ml   Output 98689 ml   Net -6540 ml       Physical Exam   Constitutional: She is oriented to person, place, and time. She appears well-developed and well-nourished. No distress.   Chronically ill female, nad  Sitting in bed   HENT:   Head: Normocephalic and atraumatic.   Right Ear: External ear normal.   Left Ear: External ear normal.   Nose: Nose normal.   Mouth/Throat: Oropharynx is clear and moist.   Eyes: Pupils are equal, round, and reactive to light. Conjunctivae and EOM are normal.   Neck: Normal range of motion. Neck supple. No JVD present. No tracheal deviation present. No thyromegaly present.   Cardiovascular: Normal rate, regular rhythm, normal heart sounds and intact distal pulses. Exam reveals no gallop and no friction rub.   No murmur heard.  Pulmonary/Chest: Effort normal. No stridor. No respiratory distress. She has no wheezes. She has rales (decreased). She exhibits no tenderness.   Decreased at bases but better   Abdominal: Soft. Bowel sounds are normal. She exhibits no distension. There  is no tenderness. There is no rebound and no guarding.   + ostomy   Musculoskeletal: Normal range of motion. She exhibits edema (in dependent areas). She exhibits no tenderness.   Lymphadenopathy:     She has no cervical adenopathy.   Neurological: She is alert and oriented to person, place, and time. No cranial nerve deficit.   Skin: Skin is warm and dry. She is not diaphoretic.   Psychiatric: She has a normal mood and affect. Her behavior is normal.   Nursing note and vitals reviewed.      Vents:  Oxygen Concentration (%): 45 (03/20/20 0702)    Lines/Drains/Airways     Peripherally Inserted Central Catheter Line            PICC Double Lumen 03/06/20 1154 other (see comments) 18 days          Drain                 Hemodialysis AV Fistula Left upper arm -- days         Ileostomy 02/13/20 1600 Loop RLQ 40 days         Urethral Catheter 03/16/20 2230 Straight-tip 18 Fr. 8 days                Significant Labs:    CBC/Anemia Profile:  Recent Labs   Lab 03/24/20  0432 03/25/20  0505   WBC 9.45 11.86   HGB 8.4* 8.3*   HCT 26.5* 26.5*   * 523*   MCV 95 97   RDW 16.6* 15.9*        Chemistries:  Recent Labs   Lab 03/24/20  0432 03/25/20  0505    137   K 4.5 4.6    100   CO2 26 27   BUN 35* 33*   CREATININE 2.1* 2.2*   CALCIUM 8.7 8.9   ALBUMIN 2.3* 2.5*   PROT 5.6* 6.0   BILITOT 1.1* 0.8   ALKPHOS 310* 323*   ALT 22 22   AST 36 35   MG 1.6 1.5*   PHOS 5.6* 4.8*       All pertinent labs within the past 24 hours have been reviewed.    BNP - 1502    Microbiology Results (last 7 days)     Procedure Component Value Units Date/Time    Urine Culture High Risk [[id number]]  (Abnormal) Collected:  03/24/20 1343    Order Status:  Completed Specimen:  Urine, Catheterized Updated:  03/25/20 0920     Urine Culture, Routine PRESUMPTIVE PSEUDOMONAS SPECIES  >100,000 cfu/ml  Identification and susceptibility pending        ENTEROCOCCUS SPECIES  > 100,000 cfu/ml  Identification and susceptibility pending      Narrative:        Indicated criteria for high risk culture:->Prior to urologic  procedures            Significant Imaging:  I have reviewed and interpreted all pertinent imaging results/findings within the past 24 hours.     CXR (3/18) - about the same, bilateral infiltrates c/w fluid  CXR (3/20) - about the same    ECHO (3/19)  · Mild concentric left ventricular hypertrophy.  · Pulmonary hypertension present.  · Normal left ventricular systolic function. The estimated ejection fraction is 60%.  · Grade I (mild) left ventricular diastolic dysfunction consistent with impaired relaxation.  · No wall motion abnormalities.  · Normal right ventricular systolic function.  · Intermediate central venous pressure (8 mmHg).  · Trivial pericardial effusion.  · There is a left pleural effusion.  · The estimated PA systolic pressure is 47 mmHg.

## 2020-03-25 NOTE — PLAN OF CARE
03/25/20 0832   Discharge Reassessment   Assessment Type Discharge Planning Reassessment   Discharge Plan A Long-term acute care facility (LTAC)   Discharge Plan B Skilled Nursing Facility   Post-Acute Status   Post-Acute Placement Status Pending Payor Medical Review  (Appeal for LTAC denial is currently in progress with [facility].)   Discharge Delays (!) Change in Medical Condition     Pt continues to be accepted by [facility], assuming [facility] provides authorization ([employer] submitted fast appeal yesterday). They plan to address malnutrition and provide wound care, respiratory management, and ileostomy management.  Per Alla Fall, RN from [facility] will try to have a response by today although they have 72 hours to respond. Alla's contact is T: [phone], F: [phone]. [employer] will be following up this morning. SW sending [facility] updated med list this morning per her request.     Pt has been denied by the following 13 SNFs so far due to being unable to meet medical needs-5 facilities, not taking pt's insurance-4 facilities, not taking new pts due to COVID-19 -2 facilities, or lack of bed availability-2 facilities (refer to this SW's note dated 3/24/20 for further information).     Pt still has pending SNF referrals with [facility]  in [place]. Per this LINWOOD's conversation with Lay at  ([phone]), their director is taking another look at this referral as they believed pt was on dialysis and had rejected her on that basis. Per Lorraine Bangura  ([phone]), they will need to see a new chest x ray which Dr. Phan has ordered for this morning.     9:31 LINWOOD received a call from Emy with [facility] ([phone]) stating an auth (#[id number]) had been granted for [facility]. LINWOOD informed Emy that pt had not been officially clinically accepted and LTAC appeal is in process. Emy stated she did not think this would interfere. LINWOOD informed uEfemia from [facility]. LINWOOD also left a msg for Alla with  "[employer], who is processing appeal, requesting a call back. LINWOOD will call Lorraien at [facility] after their morning meeting finishes.    10:10 LINWOOD spoke with Lorraine from [facility] and informed her of pending LTAC appeal and that [facility] is pt's first choice. Lorraine stated she would cancel auth and could resubmit if LTAC appeal is not approved. They will still need to see an improved chest xray before officially clinically accepting pt.    10:44 LINWOOD received a call from Lay at [facility] stating they cannot accept pt as her medical needs are "too acute."    13:11 LINWOOD informed that pt has been approved for LTAC at [facility]. [facility] is prepared to take her when she is medically stable for d/c.  "

## 2020-03-25 NOTE — PT/OT/SLP PROGRESS
"Physical Therapy      Patient Name:  Shara Hutchins   MRN:  [id number]    Patient not seen today secondary to Patient unwilling to participate(Pt adimantly declined PT stating "I have issues today". Pt has 3 way catheter and not feeling well. ). Will follow-up 03/26/20.    Chapis Clifton, PT    "

## 2020-03-26 LAB
ABO + RH BLD: NORMAL
ALBUMIN SERPL BCP-MCNC: 2.3 G/DL (ref 3.5–5.2)
ALP SERPL-CCNC: 248 U/L (ref 55–135)
ALT SERPL W/O P-5'-P-CCNC: 19 U/L (ref 10–44)
ANION GAP SERPL CALC-SCNC: 9 MMOL/L (ref 8–16)
AST SERPL-CCNC: 25 U/L (ref 10–40)
BACTERIA UR CULT: ABNORMAL
BACTERIA UR CULT: ABNORMAL
BILIRUB SERPL-MCNC: 0.8 MG/DL (ref 0.1–1)
BLD GP AB SCN CELLS X3 SERPL QL: NORMAL
BLD PROD TYP BPU: NORMAL
BLD PROD TYP BPU: NORMAL
BLOOD UNIT EXPIRATION DATE: NORMAL
BLOOD UNIT EXPIRATION DATE: NORMAL
BLOOD UNIT TYPE CODE: 6200
BLOOD UNIT TYPE CODE: 6200
BLOOD UNIT TYPE: NORMAL
BLOOD UNIT TYPE: NORMAL
BUN SERPL-MCNC: 30 MG/DL (ref 8–23)
CALCIUM SERPL-MCNC: 8.8 MG/DL (ref 8.7–10.5)
CHLORIDE SERPL-SCNC: 102 MMOL/L (ref 95–110)
CO2 SERPL-SCNC: 24 MMOL/L (ref 23–29)
CODING SYSTEM: NORMAL
CODING SYSTEM: NORMAL
CREAT SERPL-MCNC: 2.3 MG/DL (ref 0.5–1.4)
DISPENSE STATUS: NORMAL
DISPENSE STATUS: NORMAL
ERYTHROCYTE [DISTWIDTH] IN BLOOD BY AUTOMATED COUNT: 15.8 % (ref 11.5–14.5)
EST. GFR  (AFRICAN AMERICAN): 24.3 ML/MIN/1.73 M^2
EST. GFR  (NON AFRICAN AMERICAN): 21.1 ML/MIN/1.73 M^2
GLUCOSE SERPL-MCNC: 126 MG/DL (ref 70–110)
HCT VFR BLD AUTO: 24.2 % (ref 37–48.5)
HGB BLD-MCNC: 7.5 G/DL (ref 12–16)
MAGNESIUM SERPL-MCNC: 1.4 MG/DL (ref 1.6–2.6)
MCH RBC QN AUTO: 30.1 PG (ref 27–31)
MCHC RBC AUTO-ENTMCNC: 31 G/DL (ref 32–36)
MCV RBC AUTO: 97 FL (ref 82–98)
NUM UNITS TRANS PACKED RBC: NORMAL
NUM UNITS TRANS PACKED RBC: NORMAL
PHOSPHATE SERPL-MCNC: 5 MG/DL (ref 2.7–4.5)
PLATELET # BLD AUTO: 472 K/UL (ref 150–350)
PMV BLD AUTO: 10.4 FL (ref 9.2–12.9)
POTASSIUM SERPL-SCNC: 4.8 MMOL/L (ref 3.5–5.1)
PROT SERPL-MCNC: 5.5 G/DL (ref 6–8.4)
RBC # BLD AUTO: 2.49 M/UL (ref 4–5.4)
SODIUM SERPL-SCNC: 135 MMOL/L (ref 136–145)
WBC # BLD AUTO: 10.66 K/UL (ref 3.9–12.7)

## 2020-03-26 PROCEDURE — 25000003 PHARM REV CODE 250: Performed by: HOSPITALIST

## 2020-03-26 PROCEDURE — 25000003 PHARM REV CODE 250: Performed by: INTERNAL MEDICINE

## 2020-03-26 PROCEDURE — 25000003 PHARM REV CODE 250: Performed by: FAMILY MEDICINE

## 2020-03-26 PROCEDURE — 25000003 PHARM REV CODE 250: Performed by: UROLOGY

## 2020-03-26 PROCEDURE — P9016 RBC LEUKOCYTES REDUCED: HCPCS

## 2020-03-26 PROCEDURE — 21400001 HC TELEMETRY ROOM

## 2020-03-26 PROCEDURE — 63600175 PHARM REV CODE 636 W HCPCS: Performed by: INTERNAL MEDICINE

## 2020-03-26 PROCEDURE — 86850 RBC ANTIBODY SCREEN: CPT

## 2020-03-26 PROCEDURE — 80053 COMPREHEN METABOLIC PANEL: CPT

## 2020-03-26 PROCEDURE — 25000003 PHARM REV CODE 250: Performed by: STUDENT IN AN ORGANIZED HEALTH CARE EDUCATION/TRAINING PROGRAM

## 2020-03-26 PROCEDURE — 85027 COMPLETE CBC AUTOMATED: CPT

## 2020-03-26 PROCEDURE — 99233 SBSQ HOSP IP/OBS HIGH 50: CPT | Mod: ,,, | Performed by: UROLOGY

## 2020-03-26 PROCEDURE — 94761 N-INVAS EAR/PLS OXIMETRY MLT: CPT

## 2020-03-26 PROCEDURE — 99233 PR SUBSEQUENT HOSPITAL CARE,LEVL III: ICD-10-PCS | Mod: ,,, | Performed by: UROLOGY

## 2020-03-26 PROCEDURE — 99232 SBSQ HOSP IP/OBS MODERATE 35: CPT | Mod: ,,, | Performed by: INTERNAL MEDICINE

## 2020-03-26 PROCEDURE — 83735 ASSAY OF MAGNESIUM: CPT

## 2020-03-26 PROCEDURE — 86920 COMPATIBILITY TEST SPIN: CPT

## 2020-03-26 PROCEDURE — 99232 PR SUBSEQUENT HOSPITAL CARE,LEVL II: ICD-10-PCS | Mod: ,,, | Performed by: INTERNAL MEDICINE

## 2020-03-26 PROCEDURE — 84100 ASSAY OF PHOSPHORUS: CPT

## 2020-03-26 PROCEDURE — 99900035 HC TECH TIME PER 15 MIN (STAT)

## 2020-03-26 RX ORDER — HYDROCODONE BITARTRATE AND ACETAMINOPHEN 5; 325 MG/1; MG/1
2 TABLET ORAL EVERY 6 HOURS PRN
Status: DISCONTINUED | OUTPATIENT
Start: 2020-03-26 | End: 2020-03-27 | Stop reason: HOSPADM

## 2020-03-26 RX ORDER — MAGNESIUM SULFATE HEPTAHYDRATE 40 MG/ML
2 INJECTION, SOLUTION INTRAVENOUS ONCE
Status: DISCONTINUED | OUTPATIENT
Start: 2020-03-26 | End: 2020-03-27 | Stop reason: HOSPADM

## 2020-03-26 RX ORDER — HYDROCODONE BITARTRATE AND ACETAMINOPHEN 500; 5 MG/1; MG/1
TABLET ORAL
Status: DISCONTINUED | OUTPATIENT
Start: 2020-03-26 | End: 2020-03-27 | Stop reason: HOSPADM

## 2020-03-26 RX ADMIN — HYDROCODONE BITARTRATE AND ACETAMINOPHEN 2 TABLET: 5; 325 TABLET ORAL at 02:03

## 2020-03-26 RX ADMIN — CLOTRIMAZOLE 10 MG: 10 LOZENGE ORAL; TOPICAL at 04:03

## 2020-03-26 RX ADMIN — PANTOPRAZOLE SODIUM 40 MG: 40 TABLET, DELAYED RELEASE ORAL at 06:03

## 2020-03-26 RX ADMIN — CLOTRIMAZOLE 10 MG: 10 LOZENGE ORAL; TOPICAL at 09:03

## 2020-03-26 RX ADMIN — HYDROMORPHONE HYDROCHLORIDE 0.5 MG: 1 INJECTION, SOLUTION INTRAMUSCULAR; INTRAVENOUS; SUBCUTANEOUS at 02:03

## 2020-03-26 RX ADMIN — DIPHENHYDRAMINE HYDROCHLORIDE 6.25 MG: 50 INJECTION, SOLUTION INTRAMUSCULAR; INTRAVENOUS at 09:03

## 2020-03-26 RX ADMIN — HYDROCODONE BITARTRATE AND ACETAMINOPHEN 1 TABLET: 5; 325 TABLET ORAL at 12:03

## 2020-03-26 RX ADMIN — CYCLOBENZAPRINE HYDROCHLORIDE 5 MG: 5 TABLET, FILM COATED ORAL at 02:03

## 2020-03-26 RX ADMIN — HYOSCYAMINE SULFATE 0.12 MG: 0.12 TABLET ORAL at 05:03

## 2020-03-26 RX ADMIN — DIPHENOXYLATE HYDROCHLORIDE AND ATROPINE SULFATE 1 TABLET: 2.5; .025 TABLET ORAL at 08:03

## 2020-03-26 RX ADMIN — CEFEPIME HYDROCHLORIDE 1 G: 1 INJECTION, SOLUTION INTRAVENOUS at 11:03

## 2020-03-26 RX ADMIN — CEFEPIME HYDROCHLORIDE 1 G: 1 INJECTION, SOLUTION INTRAVENOUS at 09:03

## 2020-03-26 RX ADMIN — DRONABINOL 2.5 MG: 2.5 CAPSULE ORAL at 04:03

## 2020-03-26 RX ADMIN — HYDROMORPHONE HYDROCHLORIDE 0.5 MG: 1 INJECTION, SOLUTION INTRAMUSCULAR; INTRAVENOUS; SUBCUTANEOUS at 09:03

## 2020-03-26 RX ADMIN — BRIMONIDINE TARTRATE 1 DROP: 1.5 SOLUTION OPHTHALMIC at 08:03

## 2020-03-26 RX ADMIN — OXYBUTYNIN CHLORIDE 10 MG: 10 TABLET, EXTENDED RELEASE ORAL at 08:03

## 2020-03-26 RX ADMIN — PSYLLIUM HUSK 2 PACKET: 3.4 POWDER ORAL at 08:03

## 2020-03-26 RX ADMIN — DRONABINOL 2.5 MG: 2.5 CAPSULE ORAL at 06:03

## 2020-03-26 RX ADMIN — PSYLLIUM HUSK 2 PACKET: 3.4 POWDER ORAL at 02:03

## 2020-03-26 RX ADMIN — DIPHENOXYLATE HYDROCHLORIDE AND ATROPINE SULFATE 1 TABLET: 2.5; .025 TABLET ORAL at 02:03

## 2020-03-26 RX ADMIN — HYDROCODONE BITARTRATE AND ACETAMINOPHEN 1 TABLET: 5; 325 TABLET ORAL at 07:03

## 2020-03-26 RX ADMIN — SERTRALINE HYDROCHLORIDE 50 MG: 50 TABLET ORAL at 08:03

## 2020-03-26 RX ADMIN — MAGNESIUM OXIDE 400 MG: 400 TABLET ORAL at 09:03

## 2020-03-26 RX ADMIN — DIPHENHYDRAMINE HYDROCHLORIDE 6.25 MG: 50 INJECTION, SOLUTION INTRAMUSCULAR; INTRAVENOUS at 11:03

## 2020-03-26 RX ADMIN — CLOTRIMAZOLE 10 MG: 10 LOZENGE ORAL; TOPICAL at 08:03

## 2020-03-26 RX ADMIN — MAGNESIUM SULFATE 2 G: 2 INJECTION INTRAVENOUS at 11:03

## 2020-03-26 RX ADMIN — DIPHENOXYLATE HYDROCHLORIDE AND ATROPINE SULFATE 1 TABLET: 2.5; .025 TABLET ORAL at 04:03

## 2020-03-26 RX ADMIN — CLOTRIMAZOLE 10 MG: 10 LOZENGE ORAL; TOPICAL at 02:03

## 2020-03-26 RX ADMIN — LEVOTHYROXINE SODIUM 88 MCG: 88 TABLET ORAL at 06:03

## 2020-03-26 RX ADMIN — MAGNESIUM OXIDE 400 MG: 400 TABLET ORAL at 08:03

## 2020-03-26 RX ADMIN — BRIMONIDINE TARTRATE 1 DROP: 1.5 SOLUTION OPHTHALMIC at 09:03

## 2020-03-26 RX ADMIN — HYOSCYAMINE SULFATE 0.12 MG: 0.12 TABLET ORAL at 07:03

## 2020-03-26 RX ADMIN — BRIMONIDINE TARTRATE 1 DROP: 1.5 SOLUTION OPHTHALMIC at 02:03

## 2020-03-26 RX ADMIN — DIPHENHYDRAMINE HYDROCHLORIDE 6.25 MG: 50 INJECTION, SOLUTION INTRAMUSCULAR; INTRAVENOUS at 12:03

## 2020-03-26 RX ADMIN — HYDROCODONE BITARTRATE AND ACETAMINOPHEN 2 TABLET: 5; 325 TABLET ORAL at 09:03

## 2020-03-26 RX ADMIN — AZITHROMYCIN 500 MG: 250 TABLET, FILM COATED ORAL at 08:03

## 2020-03-26 RX ADMIN — DIPHENOXYLATE HYDROCHLORIDE AND ATROPINE SULFATE 1 TABLET: 2.5; .025 TABLET ORAL at 09:03

## 2020-03-26 RX ADMIN — MAGNESIUM OXIDE 800 MG: 400 TABLET ORAL at 06:03

## 2020-03-26 RX ADMIN — FLUTICASONE PROPIONATE 100 MCG: 50 SPRAY, METERED NASAL at 08:03

## 2020-03-26 RX ADMIN — HYOSCYAMINE SULFATE 0.12 MG: 0.12 TABLET ORAL at 12:03

## 2020-03-26 NOTE — PROGRESS NOTES
[facility]  Progress Note    Patient Name: Shara Hutchins  MRN: [id number]  Patient Class: IP- Inpatient   Admission Date: 3/7/2020  Length of Stay: 17 days  Attending Physician: Aleksander Phan MD  Primary Care Provider: April Horton MD        Subjective:     Principal Problem:Acute hypoxemic respiratory failure    Interval History:     Patient has continuous bladder irrigation was.  Since urine is clearing and no signs of blood  Hemoglobin dropped further, no signs of bleeding at this moment and will transfuse  Patient most likely discharged with IV/p.o.  Antibiotics  Ultrasound reviewed    Review of Systems  Objective:     Vital Signs (Most Recent):  Temp: 98.1 °F (36.7 °C) (03/26/20 1531)  Pulse: 86 (03/26/20 1531)  Resp: 16 (03/26/20 1531)  BP: (!) 114/57 (03/26/20 1531)  SpO2: 95 % (03/26/20 1531) Vital Signs (24h Range):  Temp:  [97.3 °F (36.3 °C)-99.3 °F (37.4 °C)] 98.1 °F (36.7 °C)  Pulse:  [76-93] 86  Resp:  [16-19] 16  SpO2:  [92 %-97 %] 95 %  BP: ()/(57-69) 114/57     Weight: 56 kg (123 lb 7.3 oz)  Body mass index is 19.05 kg/m².    Intake/Output Summary (Last 24 hours) at 3/26/2020 1534  Last data filed at 3/26/2020 1531  Gross per 24 hour   Intake 66882 ml   Output 89141 ml   Net 875 ml      Physical Exam      Overview/Hospital Course:     Significant Labs:   BMP:   Recent Labs   Lab 03/26/20  0507   *   *   K 4.8      CO2 24   BUN 30*   CREATININE 2.3*   CALCIUM 8.8   MG 1.4*     CBC:   Recent Labs   Lab 03/25/20  0505 03/26/20  0507   WBC 11.86 10.66   HGB 8.3* 7.5*   HCT 26.5* 24.2*   * 472*       Significant Imaging: I have reviewed all pertinent imaging results/findings within the past 24 hours.    Physical Exam:  General- Patient alert and oriented x3 in NAD  HEENT- PERRLA, EOMI, OP clear, MMM  Neck- No JVD  CV- Regular rate and rhythm, No Murmur/jackie/rubs  Resp- Lungs clear  GI- Non tender/non-distended, BS normoactive    Extrem- No cyanosis, clubbing  Neuro- awake and alert   abdomen : soft ,  Mold tender SP area     Assessment/Plan:       High output ileostomy secondary to previous history of bowel surgery with short bowel syndrome /history of inflammatory bowel disease :improved.     Suprapubic area tenderness most likely from UTI (+ repeat UC)/bladder spasms        Acute hypoxemic respiratory failure at admission , resolved .    New onset hematuria with previous history of UTI with KLB pneumonia s/p completed Rx with rocephin. unsure cause currently  . U caht insitu          DARCI with Stage 5 chronic kidney disease not on chronic dialysis:back to base line         Pulmonary hypertension        Malnutrition        Debility         Unstageable pressure injury of skin and tissue     S/P ileostomy        Chronic diarrhea history      Iron deficiency anemia due to chronic blood loss        Inflammatory bowel disease        DNR (do not resuscitate)    PLAN   DC saline irrigation   Continue antibiotics for UTI  Patient is to discharge back to skilled nursing facility when hematuria resolve possible tomorrow   May need IV antibiotics at DC  Still hold  lovenox  Reversal of ileostomy as per surgery at later date   Hold torsemide  for the time being , follow renal function Hold IV lasix PRN  Too. Cardiology recommend to hold at DC .  Ditropan       Active Diagnoses:    Diagnosis Date Noted POA    PRINCIPAL PROBLEM:  Acute hypoxemic respiratory failure [J96.01] 03/18/2020 No    Pulmonary hypertension [I27.20] 03/20/2020 Unknown    Malnutrition [E46] 03/07/2020 Yes    Debility [R53.81] 03/07/2020 Yes    Unstageable pressure injury of skin and tissue [L89.95] 03/06/2020 Yes    DNR (do not resuscitate) [Z66] 02/24/2020 Yes    S/P ileostomy [Z93.2] 02/13/2020 Not Applicable    Chronic diarrhea [K52.9] 02/10/2020 Yes     Chronic    Anemia of chronic disease [D63.8] 02/10/2020 Yes     Chronic    Stage 5 chronic kidney disease not on  chronic dialysis [N18.5] 11/04/2018 Yes     Chronic    Iron deficiency anemia due to chronic blood loss [D50.0] 01/07/2018 Yes     Chronic    Inflammatory bowel disease [K52.9] 01/07/2018 Yes     Chronic      Problems Resolved During this Admission:    Diagnosis Date Noted Date Resolved POA    Thrombocytopenia, unspecified [D69.6] 03/07/2020 03/18/2020 Yes    Acute renal failure superimposed on stage 5 chronic kidney disease, not on chronic dialysis [N17.9, N18.5] 02/10/2020 03/18/2020 Yes     VTE Risk Mitigation (From admission, onward)         Ordered     Place sequential compression device  Until discontinued      03/08/20 0133                   Aleksander Phan MD  Department of Hospital Medicine   [facility]

## 2020-03-26 NOTE — SUBJECTIVE & OBJECTIVE
Interval History:     3/19/2020 - Stable overnight, no new issues reported.  Weight is up 2 kg from admit but that weight is up about 10 kg from last hospital stay.  No new respiratory complaints, remains dyspneic and on NC O2.chest X-ray yesterday was unchanged.  I/O negative 2.2 liters.  Had atrial fibrillation last night and is back in NSR on cardizem.    3/20/2020 - Feels beter today, on lasix drip.  Weight decreased 2.6 kg, I/O negative 6.3 liters yesterday.  No new complaints.    3/21/2020 - Continnues to feel better, no neew complaints.  TPN has been stopped and she has started eating.  Is about 12 liters negative over the last 2 days    3/22/2020 - Stable overnight, no new complaints, she tells me that they are working on transfer to a rehab hospital. I/O 16.3 liters negative over last 3 days, no new weight today    3/23/2020 - stable overnight, continues to feel better , about 19-20 liters negative by I/O and visibly better.  Waiting on transfer to rehab.  O2 has decreased.    3/24/2020 - Stable overnight, no new issues reported.  Has blood and clots in urine. (being evaluated)    3/25/2020 - Stable overnight, no new complaints but does have some eye itching and nasal congestion.  Is getting bladder irrigation (seen by urology).  Respiratory status is good, bP a little low (? A little dry now).  Weight p 4 kg (? If related to ongoing bladder irrigation) - follow     3/26/2020 - Stable overnight, no new issues reported.  Urine has cleared.  CXR shows marked improvement    Review of Systems   Constitutional: Positive for malaise/fatigue and weight loss. Negative for chills, diaphoresis and fever.   HENT: Negative for congestion.    Eyes: Negative for pain.   Respiratory: Positive for cough and shortness of breath. Negative for hemoptysis, sputum production, wheezing and stridor.    Cardiovascular: Positive for orthopnea and leg swelling. Negative for chest pain, palpitations, claudication and PND.    Gastrointestinal: Positive for diarrhea. Negative for abdominal pain, constipation, heartburn, nausea and vomiting.        Ostomy   Genitourinary: Negative for dysuria, frequency, hematuria and urgency.   Musculoskeletal: Negative for falls and myalgias.   Neurological: Positive for weakness (generalized). Negative for sensory change and focal weakness.   Psychiatric/Behavioral: Negative for depression and suicidal ideas. The patient is not nervous/anxious.          Objective:     Vital Signs (Most Recent):  Temp: 98.3 °F (36.8 °C) (03/26/20 1249)  Pulse: 88 (03/26/20 1249)  Resp: 16 (03/26/20 1249)  BP: 108/68 (03/26/20 1249)  SpO2: (!) 94 % (03/26/20 1249) Vital Signs (24h Range):  Temp:  [97.3 °F (36.3 °C)-98.7 °F (37.1 °C)] 98.3 °F (36.8 °C)  Pulse:  [76-93] 88  Resp:  [16-19] 16  SpO2:  [92 %-97 %] 94 %  BP: ()/(55-69) 108/68     Weight: 56 kg (123 lb 7.3 oz)  Body mass index is 19.05 kg/m².      Intake/Output Summary (Last 24 hours) at 3/26/2020 1304  Last data filed at 3/26/2020 0837  Gross per 24 hour   Intake 50349 ml   Output 08803 ml   Net -2970 ml       Physical Exam   Constitutional: She is oriented to person, place, and time. She appears well-developed and well-nourished. No distress.   Chronically ill female, nad  Sitting in bed   HENT:   Head: Normocephalic and atraumatic.   Right Ear: External ear normal.   Left Ear: External ear normal.   Nose: Nose normal.   Mouth/Throat: Oropharynx is clear and moist.   Eyes: Pupils are equal, round, and reactive to light. Conjunctivae and EOM are normal.   Neck: Normal range of motion. Neck supple. No JVD present. No tracheal deviation present. No thyromegaly present.   Cardiovascular: Normal rate, regular rhythm, normal heart sounds and intact distal pulses. Exam reveals no gallop and no friction rub.   No murmur heard.  Pulmonary/Chest: Effort normal. No stridor. No respiratory distress. She has no wheezes. She has rales (decreased). She exhibits no  tenderness.   Decreased at bases but better   Abdominal: Soft. Bowel sounds are normal. She exhibits no distension. There is no tenderness. There is no rebound and no guarding.   + ostomy   Musculoskeletal: Normal range of motion. She exhibits edema (in dependent areas). She exhibits no tenderness.   Lymphadenopathy:     She has no cervical adenopathy.   Neurological: She is alert and oriented to person, place, and time. No cranial nerve deficit.   Skin: Skin is warm and dry. She is not diaphoretic.   Psychiatric: She has a normal mood and affect. Her behavior is normal.   Nursing note and vitals reviewed.      Vents:  Oxygen Concentration (%): 45 (03/20/20 0702)    Lines/Drains/Airways     Peripherally Inserted Central Catheter Line            PICC Double Lumen 03/06/20 1154 other (see comments) 20 days          Drain                 Hemodialysis AV Fistula Left upper arm -- days         Ileostomy 02/13/20 1600 Loop RLQ 41 days         Urethral Catheter 03/16/20 2230 Straight-tip 18 Fr. 9 days                Significant Labs:    CBC/Anemia Profile:  Recent Labs   Lab 03/25/20  0505 03/26/20  0507   WBC 11.86 10.66   HGB 8.3* 7.5*   HCT 26.5* 24.2*   * 472*   MCV 97 97   RDW 15.9* 15.8*        Chemistries:  Recent Labs   Lab 03/25/20  0505 03/26/20  0507    135*   K 4.6 4.8    102   CO2 27 24   BUN 33* 30*   CREATININE 2.2* 2.3*   CALCIUM 8.9 8.8   ALBUMIN 2.5* 2.3*   PROT 6.0 5.5*   BILITOT 0.8 0.8   ALKPHOS 323* 248*   ALT 22 19   AST 35 25   MG 1.5* 1.4*   PHOS 4.8* 5.0*       All pertinent labs within the past 24 hours have been reviewed.    BNP - 1502    Microbiology Results (last 7 days)     Procedure Component Value Units Date/Time    Urine Culture High Risk [[id number]]  (Abnormal)  (Susceptibility) Collected:  03/24/20 1343    Order Status:  Completed Specimen:  Urine, Catheterized Updated:  03/26/20 0757     Urine Culture, Routine PSEUDOMONAS AERUGINOSA  >100,000 cfu/ml         ENTEROCOCCUS FAECALIS  > 100,000 cfu/ml      Narrative:       Indicated criteria for high risk culture:->Prior to urologic  procedures            Significant Imaging:  I have reviewed and interpreted all pertinent imaging results/findings within the past 24 hours.     CXR (3/18) - about the same, bilateral infiltrates c/w fluid  CXR (3/20) - about the same    ECHO (3/19)  · Mild concentric left ventricular hypertrophy.  · Pulmonary hypertension present.  · Normal left ventricular systolic function. The estimated ejection fraction is 60%.  · Grade I (mild) left ventricular diastolic dysfunction consistent with impaired relaxation.  · No wall motion abnormalities.  · Normal right ventricular systolic function.  · Intermediate central venous pressure (8 mmHg).  · Trivial pericardial effusion.  · There is a left pleural effusion.  · The estimated PA systolic pressure is 47 mmHg.

## 2020-03-26 NOTE — PT/OT/SLP PROGRESS
Occupational Therapy      Patient Name:  Shara Hutchins   MRN:  [id number]    Patient not seen today X 2 attempts secondary to Other (Comment)(Pt refused in AM, receiving blood in PM.). Will follow-up next service date.    Nithya Sterling OT  3/26/2020

## 2020-03-26 NOTE — PROGRESS NOTES
[facility]  Pulmonology  Progress Note    Patient Name: Shara Hutchins  MRN: [id number]  Admission Date: 3/7/2020  Hospital Length of Stay: 17 days  Code Status: DNR  Attending Provider: Aleksander Phan MD  Primary Care Provider: April Horton MD   Principal Problem: Acute hypoxemic respiratory failure    Subjective:     Interval History:     3/19/2020 - Stable overnight, no new issues reported.  Weight is up 2 kg from admit but that weight is up about 10 kg from last hospital stay.  No new respiratory complaints, remains dyspneic and on NC O2.chest X-ray yesterday was unchanged.  I/O negative 2.2 liters.  Had atrial fibrillation last night and is back in NSR on cardizem.    3/20/2020 - Feels beter today, on lasix drip.  Weight decreased 2.6 kg, I/O negative 6.3 liters yesterday.  No new complaints.    3/21/2020 - Continnues to feel better, no neew complaints.  TPN has been stopped and she has started eating.  Is about 12 liters negative over the last 2 days    3/22/2020 - Stable overnight, no new complaints, she tells me that they are working on transfer to a rehab hospital. I/O 16.3 liters negative over last 3 days, no new weight today    3/23/2020 - stable overnight, continues to feel better , about 19-20 liters negative by I/O and visibly better.  Waiting on transfer to rehab.  O2 has decreased.    3/24/2020 - Stable overnight, no new issues reported.  Has blood and clots in urine. (being evaluated)    3/25/2020 - Stable overnight, no new complaints but does have some eye itching and nasal congestion.  Is getting bladder irrigation (seen by urology).  Respiratory status is good, bP a little low (? A little dry now).  Weight p 4 kg (? If related to ongoing bladder irrigation) - follow     3/26/2020 - Stable overnight, no new issues reported.  Urine has cleared.  CXR shows marked improvement    Review of Systems   Constitutional: Positive for malaise/fatigue and weight loss. Negative for chills,  diaphoresis and fever.   HENT: Negative for congestion.    Eyes: Negative for pain.   Respiratory: Positive for cough and shortness of breath. Negative for hemoptysis, sputum production, wheezing and stridor.    Cardiovascular: Positive for orthopnea and leg swelling. Negative for chest pain, palpitations, claudication and PND.   Gastrointestinal: Positive for diarrhea. Negative for abdominal pain, constipation, heartburn, nausea and vomiting.        Ostomy   Genitourinary: Negative for dysuria, frequency, hematuria and urgency.   Musculoskeletal: Negative for falls and myalgias.   Neurological: Positive for weakness (generalized). Negative for sensory change and focal weakness.   Psychiatric/Behavioral: Negative for depression and suicidal ideas. The patient is not nervous/anxious.          Objective:     Vital Signs (Most Recent):  Temp: 98.3 °F (36.8 °C) (03/26/20 1249)  Pulse: 88 (03/26/20 1249)  Resp: 16 (03/26/20 1249)  BP: 108/68 (03/26/20 1249)  SpO2: (!) 94 % (03/26/20 1249) Vital Signs (24h Range):  Temp:  [97.3 °F (36.3 °C)-98.7 °F (37.1 °C)] 98.3 °F (36.8 °C)  Pulse:  [76-93] 88  Resp:  [16-19] 16  SpO2:  [92 %-97 %] 94 %  BP: ()/(55-69) 108/68     Weight: 56 kg (123 lb 7.3 oz)  Body mass index is 19.05 kg/m².      Intake/Output Summary (Last 24 hours) at 3/26/2020 1304  Last data filed at 3/26/2020 0837  Gross per 24 hour   Intake 47596 ml   Output 17256 ml   Net -2970 ml       Physical Exam   Constitutional: She is oriented to person, place, and time. She appears well-developed and well-nourished. No distress.   Chronically ill female, nad  Sitting in bed   HENT:   Head: Normocephalic and atraumatic.   Right Ear: External ear normal.   Left Ear: External ear normal.   Nose: Nose normal.   Mouth/Throat: Oropharynx is clear and moist.   Eyes: Pupils are equal, round, and reactive to light. Conjunctivae and EOM are normal.   Neck: Normal range of motion. Neck supple. No JVD present. No tracheal  deviation present. No thyromegaly present.   Cardiovascular: Normal rate, regular rhythm, normal heart sounds and intact distal pulses. Exam reveals no gallop and no friction rub.   No murmur heard.  Pulmonary/Chest: Effort normal. No stridor. No respiratory distress. She has no wheezes. She has rales (decreased). She exhibits no tenderness.   Decreased at bases but better   Abdominal: Soft. Bowel sounds are normal. She exhibits no distension. There is no tenderness. There is no rebound and no guarding.   + ostomy   Musculoskeletal: Normal range of motion. She exhibits edema (in dependent areas). She exhibits no tenderness.   Lymphadenopathy:     She has no cervical adenopathy.   Neurological: She is alert and oriented to person, place, and time. No cranial nerve deficit.   Skin: Skin is warm and dry. She is not diaphoretic.   Psychiatric: She has a normal mood and affect. Her behavior is normal.   Nursing note and vitals reviewed.      Vents:  Oxygen Concentration (%): 45 (03/20/20 0702)    Lines/Drains/Airways     Peripherally Inserted Central Catheter Line            PICC Double Lumen 03/06/20 1154 other (see comments) 20 days          Drain                 Hemodialysis AV Fistula Left upper arm -- days         Ileostomy 02/13/20 1600 Loop RLQ 41 days         Urethral Catheter 03/16/20 2230 Straight-tip 18 Fr. 9 days                Significant Labs:    CBC/Anemia Profile:  Recent Labs   Lab 03/25/20  0505 03/26/20  0507   WBC 11.86 10.66   HGB 8.3* 7.5*   HCT 26.5* 24.2*   * 472*   MCV 97 97   RDW 15.9* 15.8*        Chemistries:  Recent Labs   Lab 03/25/20  0505 03/26/20  0507    135*   K 4.6 4.8    102   CO2 27 24   BUN 33* 30*   CREATININE 2.2* 2.3*   CALCIUM 8.9 8.8   ALBUMIN 2.5* 2.3*   PROT 6.0 5.5*   BILITOT 0.8 0.8   ALKPHOS 323* 248*   ALT 22 19   AST 35 25   MG 1.5* 1.4*   PHOS 4.8* 5.0*       All pertinent labs within the past 24 hours have been reviewed.    BNP - 1502    Microbiology  Results (last 7 days)     Procedure Component Value Units Date/Time    Urine Culture High Risk [[id number]]  (Abnormal)  (Susceptibility) Collected:  03/24/20 1343    Order Status:  Completed Specimen:  Urine, Catheterized Updated:  03/26/20 0757     Urine Culture, Routine PSEUDOMONAS AERUGINOSA  >100,000 cfu/ml        ENTEROCOCCUS FAECALIS  > 100,000 cfu/ml      Narrative:       Indicated criteria for high risk culture:->Prior to urologic  procedures            Significant Imaging:  I have reviewed and interpreted all pertinent imaging results/findings within the past 24 hours.     CXR (3/18) - about the same, bilateral infiltrates c/w fluid  CXR (3/20) - about the same    ECHO (3/19)  · Mild concentric left ventricular hypertrophy.  · Pulmonary hypertension present.  · Normal left ventricular systolic function. The estimated ejection fraction is 60%.  · Grade I (mild) left ventricular diastolic dysfunction consistent with impaired relaxation.  · No wall motion abnormalities.  · Normal right ventricular systolic function.  · Intermediate central venous pressure (8 mmHg).  · Trivial pericardial effusion.  · There is a left pleural effusion.  · The estimated PA systolic pressure is 47 mmHg.    Assessment/Plan:     * Acute hypoxemic respiratory failure  · Resolved  · Was due to volume overload    Pulmonary hypertension  · I suspect group 2 (diastolic dysfunction)  · Follow, no acute treatment needed    Malnutrition  · Fairly severe, follow  · TPN stopped and oral diet increased    Debility  · PT, OT as able    Unstageable pressure injury of skin and tissue  · Continue with local care    DNR (do not resuscitate)  · Aware     S/P ileostomy  · Aware     Chronic diarrhea  · S/p ostomy  · Better overall     Stage 5 chronic kidney disease not on chronic dialysis  · Aware   · Creatinine up some - will hold diuretics  · Continue to follow     Iron deficiency anemia due to chronic blood loss  · Aware, has some blood in urine -  being addressed  · Transfuse for hgb < 7    Inflammatory bowel disease  · Aware     Respiratory status is stable, I will sign off please reconsult if I can be of further help.       Moo Maharaj MD  Pulmonology  [facility]

## 2020-03-26 NOTE — PLAN OF CARE
"   03/26/20 0849   Discharge Reassessment   Assessment Type Discharge Planning Reassessment   Anticipated Discharge Disposition LTAC  ([facility] has authorized LTAC stay at [facility], auth # [id number])   Post-Acute Status   Post-Acute Authorization Placement   Post-Acute Placement Status Awaiting Internal Medical Clearance     Pt has been authorized to d/c to [facility]. Pt may potentially d/c today if she is medically cleared. ILNWOOD sent a secure chat to Dr. Phan this morning requesting updates on pt's medical condition once he has seen her. SW to await further information.    11:51 Per Dr. Shine's note from this morning, pt "is scheduled to receive a packed red cell transfusion and will is also correct magnesium with IV magnesium sulfate." Note also states pt appears to be hemodynamically stable and he is in agreement with transfer to facility once the above is done. LINWOOD spoke with pt's nurse Shakila who stated the transfusion will take 6-8 hours, which would mean transfer anywhere from 18:00-20:00 this evening. LINWOOD will inform Eufemia.  "

## 2020-03-26 NOTE — PLAN OF CARE
03/26/20 0807   Patient Assessment/Suction   Level of Consciousness (AVPU) alert   Respiratory Effort Normal;Unlabored   All Lung Fields Breath Sounds clear   PRE-TX-O2   O2 Device (Oxygen Therapy) room air   SpO2 95 %   Pulse Oximetry Type Intermittent   $ Pulse Oximetry - Multiple Charge Pulse Oximetry - Multiple   Pulse 87   Resp 16   Inhaler   $ Inhaler Charges PRN treatment not required   Respiratory Treatment Status (Inhaler) PRN treatment not required   Respiratory Evaluation   $ Care Plan Tech Time 15 min

## 2020-03-26 NOTE — PROGRESS NOTES
Interval History:   Patient is feeling much better she is anxious to be transferred back to skilled nursing facility.  Arm she feels good hematuria has resolved with continues bladder irrigation.  She is scheduled to receive a packed red cell transfusion and will is also correct magnesium with IV magnesium sulfate.      Review of Systems     Denies Chest pain, sob, or palpitations  No recent fever, cough chills or congestion  No blood in the urine or stool  No myalgias  No recent arm, neck, or jaw pain  No lightheadedness or dizziness  No Double vision, blurry, vision or headache     Objective:     Vital Signs (Most Recent):  Temp: 97.3 °F (36.3 °C) (03/26/20 0812)  Pulse: 84 (03/26/20 0812)  Resp: 16 (03/26/20 0812)  BP: 122/63 (03/26/20 0812)  SpO2: (!) 94 % (03/26/20 0812) Vital Signs (24h Range):  Temp:  [97.3 °F (36.3 °C)-98.7 °F (37.1 °C)] 97.3 °F (36.3 °C)  Pulse:  [76-87] 84  Resp:  [16-20] 16  SpO2:  [92 %-97 %] 94 %  BP: ()/(55-63) 122/63     Weight: 56 kg (123 lb 7.3 oz)  Body mass index is 19.05 kg/m².     SpO2: (!) 94 %  O2 Device (Oxygen Therapy): room air      Intake/Output Summary (Last 24 hours) at 3/26/2020 1040  Last data filed at 3/26/2020 0837  Gross per 24 hour   Intake 52919 ml   Output 47181 ml   Net -5710 ml       Lines/Drains/Airways     Peripherally Inserted Central Catheter Line            PICC Double Lumen 03/06/20 1154 other (see comments) 19 days          Drain                 Hemodialysis AV Fistula Left upper arm -- days         Ileostomy 02/13/20 1600 Loop RLQ 41 days         Urethral Catheter 03/16/20 2230 Straight-tip 18 Fr. 9 days                   azithromycin  500 mg Oral Daily    brimonidine 0.15 % OPTH DROP  1 drop Both Eyes TID    ceFEPime (MAXIPIME) IVPB  1 g Intravenous Q12H    clotrimazole  10 mg Oral QID    diphenoxylate-atropine 2.5-0.025 mg  1 tablet Oral QID    dronabinoL  2.5 mg Oral BID AC    epoetin nica-ebpx (RETACRIT) injection  100 Units/kg  Subcutaneous Q7 Days    fluticasone propionate  2 spray Each Nostril Daily    levothyroxine  88 mcg Oral Before breakfast    magnesium oxide  400 mg Oral BID    [START ON 3/27/2020] oxybutynin  15 mg Oral Daily    pantoprazole  40 mg Oral BID    psyllium husk (with sugar)  2 packet Oral TID    sertraline  50 mg Oral Daily    sodium chloride  1 spray Each Nostril Daily         PHYSICAL EXAM:  Constitutional: Well built, well nourished in no apparent distress  Neck: no carotid bruit, no JVD  Lungs:  Much improved scattered rhonchiChest Wall: no tenderness  Heart: regular rate and rhythm, S1, S2 normal, no murmur, click, rub or gallop   Abdomen: soft,; less tender no guarding  Extremities: Extremities normal, no edema  Neuro: AAO X 3    I HAVE REVIEWED :    The vital signs, nurses notes, and all the pertinent radiology and labs.       Significant Labs:   Results for UMAIR CHA (MRN [id number]) as of 3/26/2020 10:39   Ref. Range 3/24/2020 13:43 3/25/2020 05:05 3/25/2020 15:16 3/26/2020 05:07 3/26/2020 07:57   WBC Latest Ref Range: 3.90 - 12.70 K/uL  11.86  10.66    RBC Latest Ref Range: 4.00 - 5.40 M/uL  2.74 (L)  2.49 (L)    Hemoglobin Latest Ref Range: 12.0 - 16.0 g/dL  8.3 (L)  7.5 (L)    Hematocrit Latest Ref Range: 37.0 - 48.5 %  26.5 (L)  24.2 (L)    MCV Latest Ref Range: 82 - 98 fL  97  97    MCH Latest Ref Range: 27.0 - 31.0 pg  30.3  30.1    MCHC Latest Ref Range: 32.0 - 36.0 g/dL  31.3 (L)  31.0 (L)    RDW Latest Ref Range: 11.5 - 14.5 %  15.9 (H)  15.8 (H)    Platelets Latest Ref Range: 150 - 350 K/uL  523 (H)  472 (H)    MPV Latest Ref Range: 9.2 - 12.9 fL  10.6  10.4    Sodium Latest Ref Range: 136 - 145 mmol/L  137  135 (L)    Potassium Latest Ref Range: 3.5 - 5.1 mmol/L  4.6  4.8    Chloride Latest Ref Range: 95 - 110 mmol/L  100  102    CO2 Latest Ref Range: 23 - 29 mmol/L  27  24    Anion Gap Latest Ref Range: 8 - 16 mmol/L  10  9    BUN, Bld Latest Ref Range: 8 - 23 mg/dL  33 (H)  30 (H)     Creatinine Latest Ref Range: 0.5 - 1.4 mg/dL  2.2 (H)  2.3 (H)    eGFR if non African American Latest Ref Range: >60 mL/min/1.73 m^2  22.2 (A)  21.1 (A)    eGFR if African American Latest Ref Range: >60 mL/min/1.73 m^2  25.6 (A)  24.3 (A)    Glucose Latest Ref Range: 70 - 110 mg/dL  85  126 (H)    Calcium Latest Ref Range: 8.7 - 10.5 mg/dL  8.9  8.8    Phosphorus Latest Ref Range: 2.7 - 4.5 mg/dL  4.8 (H)  5.0 (H)    Magnesium Latest Ref Range: 1.6 - 2.6 mg/dL  1.5 (L)  1.4 (L)    Alkaline Phosphatase Latest Ref Range: 55 - 135 U/L  323 (H)  248 (H)    PROTEIN TOTAL Latest Ref Range: 6.0 - 8.4 g/dL  6.0  5.5 (L)    Albumin Latest Ref Range: 3.5 - 5.2 g/dL  2.5 (L)  2.3 (L)    BILIRUBIN TOTAL Latest Ref Range: 0.1 - 1.0 mg/dL  0.8  0.8    AST Latest Ref Range: 10 - 40 U/L  35  25    ALT Latest Ref Range: 10 - 44 U/L  22  19    Procalcitonin Latest Ref Range: 0.00 - 0.50 ng/mL   1.04 (H)     PREPARE RBC SOFT Unknown     Rpt   CULTURE, URINE Unknown Rpt (A)           Significant Imaging:             I HAVE REVIEWED :    The vital signs, nurses notes, and all the pertinent radiology and labs.       ASSESSMENT & PLAN:      1. SOB and Hypoxemia significantly improved  2. Elevated BNP clinically improved  3. CXR show hypervolemia atypical pneumonia or developing ARDs on yesterday  4. Anemia and hematuria  5. Klebsiella Pneumoniae to urine  6. DARCI on CKD Stage III now with our elevated creatinine  7. Unstageable pressure ulcer  8. Inflammatory Bowel Disease  9. Thyroid Dysfunction  10. S/P Ileostomy  11. H/O Vulvar Cancer   12. Hypokalemia  13. Low Grade Fever  14. Grade 1 Diastolic Dysfunction, Normal LVEF 60%  15. Mild-Moderate PH PA Pressure 47   16.  Acute decompensation of chronic congestive heart failure with diastolic dysfunction, ejection fraction of 60%, now improved  Seventeen.  Persistent hematuria    Recommendations:  PATIENT APPEARS IN HEMODYNAMICALLY STABLE AGREE WITH PLANS FOR TRANSFER RECOMMEND INTRAVENOUS  MAGNESIUM OXIDE PRIOR TO TRANSFER AND PACS IN RED CELL TRANSFUSION.  SHE IS ENCOURAGED TO CALL THE OFFICE FOR ELECTRONIC VISIT IN ABOUT 7-10 DAYS AFTER DISCHARGE.  CONTINUE ON PRESENT REGIMEN CONTINUE TO WITHHOLD DEMADEX FOR THE TIME BEING    I have personally interviewed and examined the patient, I have reviewed the Nurse Practitioner's history and physical, assessment, and plan. I have personally evaluated the patient at bedside and agree with the findings.

## 2020-03-26 NOTE — PT/OT/SLP PROGRESS
Physical Therapy      Patient Name:  Shara Hutchins   MRN:  [id number]    Patient not seen today secondary to (patient currently receiving blood transfusion ([id number])). Will follow-up 03/27/20.    Amanda Ayers PTA

## 2020-03-26 NOTE — CONSULTS
[facility] Urology Consult Patient/H&P:    Shara Hutchins is a 69 y.o. female who presents for gross hematuria.    Patient with a history of HTN, vulvar cancer s/p chemo/XRT in 2013 and CKD who presented to the [facility] emergency department on 2/23/20 with abdominal pain, vaginal bleeding, DARCI  (3.8 from 2.5 baseline) and a 20 pound weight loss. Lee was placed on admission to monitor UOP. CTRSS 2/12 2/23 showed bilateral nonobstructing renal stones.     She underwent loop ileostomy on 2/13/20 for chronic diarrhea. Renal ultrasound on 2/26 showed no hydro, b renal stones and indeterminate artifact. She went to [facility] on 3/7 and returned and states gross hematuria started after this. Prior to admission pt denied having gross hematuria, urinary frequency, vaginal bleeding or frequent uti's. She did however state that her vulvar itching had returned and she was concerned about recurrence of cancer with no recent f/u by gyn onc. She has seen by  inpt and plan was for outpt f/u with .      She has been awaiting LTAC placement on 3/13 I was consulted for persistent hematuria. The plan was for outpt cysto to eval for any bladder path not seen on ct and discussion of renal stones as well as discharging with lee. Her hematuria continued, ucx on 3/15 grew Klebsiella and she was started on rocephin and completed a 5d course. Around 3/17  she was given a voiding trial vs lee exchange? on 3/17 for larger 18fr.       She continued to have hematuria requiring intermittent irrigation and 2 transfusions (last one 2d agao on 3/21/20). Urology was re-consulted 3/24, Dr. Garcia asked them to remove the 18fr, place a 22 Israeli 3 way.        3/24 Lee catheter changed to 24 fr 3 way and irrigated some small clots to light pink. C/o pain overnight in bladder. Pvr: 12cc, urine light red in am and pt manipulating irrigation to keep urine light pink. Urine sent for culture and cytology.     3/25 Cx  grewing pseud and enterococcus. Pt starting cefepime.    3/26 Urine clear with CBI off. Lee catheter plug placed on in-flow. Plan to keep lee in place and follow up in clinic in 3 weeks for exchange.        Past Medical History:   Diagnosis Date    Chronic kidney disease, stage III (moderate) 1/7/2018    Diabetes mellitus, type 2     Fatigue     Hypertension     Iron deficiency anemia due to chronic blood loss 1/7/2018    Vulvar cancer 1/7/2019       Past Surgical History:   Procedure Laterality Date    ABDOMINAL SURGERY      BACK SURGERY      carpel tunnel surgery once on each hand      CERVICAL FUSION      x 4    cervix repair      CHOLECYSTECTOMY  2000    DIAGNOSTIC LAPAROSCOPY N/A 2/13/2020    Procedure: LAPAROSCOPY, DIAGNOSTIC;  Surgeon: Robbi Lovell III, MD;  Location: [facility];  Service: General;  Laterality: N/A;    HYSTERECTOMY  1980    ILEOSTOMY N/A 2/13/2020    Procedure: CREATION, ILEOSTOMY Loop;  Surgeon: Robbi Lovell III, MD;  Location: [facility] OR;  Service: General;  Laterality: N/A;    LUMBAR LAMINECTOMY      LYSIS OF ADHESIONS N/A 2/13/2020    Procedure: LYSIS, ADHESIONS;  Surgeon: Robbi Lovell III, MD;  Location: [facility] OR;  Service: General;  Laterality: N/A;    NECK SURGERY      PHLEBOGRAPHY  2/28/2020    Procedure: VENOGRAM;  Surgeon: Robbi Lovell III, MD;  Location: [facility] OR;  Service: General;;    REMOVAL OF VASCULAR ACCESS PORT Right 2/13/2020    Procedure: REMOVAL, VASCULAR ACCESS PORT;  Surgeon: Robbi Lovell III, MD;  Location: [facility] OR;  Service: General;  Laterality: Right;    SHOULDER SURGERY      vulva cancer         Family History   Problem Relation Age of Onset    Heart disease Mother     Hypertension Mother     Cancer Mother         lung    Heart disease Father     Heart disease Sister         Open heart surgery    No Known Problems Daughter        Social History     Socioeconomic History    Marital status:      Spouse  name: Not on file    Number of children: Not on file    Years of education: Not on file    Highest education level: Not on file   Occupational History    Not on file   Social Needs    Financial resource strain: Not on file    Food insecurity:     Worry: Not on file     Inability: Not on file    Transportation needs:     Medical: Not on file     Non-medical: Not on file   Tobacco Use    Smoking status: Former Smoker     Packs/day: 1.00     Years: 33.00     Pack years: 33.00     Types: Cigarettes     Last attempt to quit: 2000     Years since quittin.6    Smokeless tobacco: Never Used   Substance and Sexual Activity    Alcohol use: No    Drug use: No    Sexual activity: Not on file   Lifestyle    Physical activity:     Days per week: Not on file     Minutes per session: Not on file    Stress: Not on file   Relationships    Social connections:     Talks on phone: Not on file     Gets together: Not on file     Attends [employer] service: Not on file     Active member of club or organization: Not on file     Attends meetings of clubs or organizations: Not on file     Relationship status: Not on file   Other Topics Concern    Not on file   Social History Narrative    Not on file       Review of patient's allergies indicates:   Allergen Reactions    Ciprofloxacin Other (See Comments)     Elevated liver enzymes      Pcn [penicillins] Anaphylaxis       Medications Reviewed: see MAR    ROS:    Constitutional: denies fevers, chills, night sweats, fatigue, malaise  Respiratory: negative for cough, shortness of breath, wheezing, dyspnea.  Cardiovascular: - for high blood pressure, negative for chest pain, varicose veins, ankle swelling, palpitations, syncope.  GI: negative for abdominal pain, heartburn, indigestion, nausea, vomiting, constipation, diarrhea, blood in stool.   Urology: as noted above in HPI  Endocrinology: negative for cold intolerance, excessive thirst, not feeling tired/sluggish, no  heat intolerance.   Hematology/Lymph: negative for easy bleeding, easy bruising, swollen glands.  Musculoskeletal: negative for back pain, joint pain, joint swelling, neck pain.  Allergy-Immunology: negative for seasonal allergies, negative for unusual infections.   Skin: negative for boils, breast lumps, hives, itching, rash.   Neurology: negative for, dizziness, headache, tingling/numbness, tremors.   Psych: satisfied with life; negative for, anxiety, depression, suicidal thoughts.     PHYSICAL EXAM:    Vitals:    03/26/20 0812   BP: 122/63   Pulse: 84   Resp: 16   Temp: 97.3 °F (36.3 °C)     Body mass index is 19.05 kg/m².       Gen: Cachectic  Neuro: Awake,  alert and oriented,   Psych: No distress,  cooperative  HEENT: No nasal discharge, normal cephalic, atraumatic  Eyes: Conjunctiva clear without discharge,  glasses   Skin: Clear  Resp: Non-labored  Cv: No pallor, no lower extremity edema  MSK: Normal cephalic atraumatic, decreased range of motion, decreased muscle tone  Abd: Non tender to palpation   : 3 way lee catheter in place with clear urine      LABS:    Recent Results (from the past 336 hour(s))   POCT glucose    Collection Time: 03/12/20 12:04 PM   Result Value Ref Range    POC Glucose 107 70 - 110   POCT glucose    Collection Time: 03/12/20  4:08 PM   Result Value Ref Range    POC Glucose 129 (H) 70 - 110   Magnesium    Collection Time: 03/13/20  5:49 AM   Result Value Ref Range    Magnesium 2.2 1.6 - 2.6 mg/dL   Comprehensive metabolic panel    Collection Time: 03/13/20  5:49 AM   Result Value Ref Range    Sodium 142 136 - 145 mmol/L    Potassium 4.4 3.5 - 5.1 mmol/L    Chloride 108 95 - 110 mmol/L    CO2 27 23 - 29 mmol/L    Glucose 110 70 - 110 mg/dL    BUN, Bld 34 (H) 8 - 23 mg/dL    Creatinine 1.7 (H) 0.5 - 1.4 mg/dL    Calcium 7.9 (L) 8.7 - 10.5 mg/dL    Total Protein 4.8 (L) 6.0 - 8.4 g/dL    Albumin 2.3 (L) 3.5 - 5.2 g/dL    Total Bilirubin 1.1 (H) 0.1 - 1.0 mg/dL    Alkaline Phosphatase  197 (H) 55 - 135 U/L    AST 19 10 - 40 U/L    ALT 13 10 - 44 U/L    Anion Gap 7 (L) 8 - 16 mmol/L    eGFR if African American 35.0 (A) >60 mL/min/1.73 m^2    eGFR if non  30.3 (A) >60 mL/min/1.73 m^2   Phosphorus    Collection Time: 03/13/20  5:49 AM   Result Value Ref Range    Phosphorus 3.7 2.7 - 4.5 mg/dL   POCT glucose    Collection Time: 03/13/20  4:24 PM   Result Value Ref Range    POC Glucose 130 (H) 70 - 110   POCT glucose    Collection Time: 03/13/20  7:29 PM   Result Value Ref Range    POC Glucose 157 (H) 70 - 110   Procalcitonin    Collection Time: 03/13/20  8:02 PM   Result Value Ref Range    Procalcitonin 0.31 0.00 - 0.50 ng/mL   Magnesium    Collection Time: 03/14/20  5:04 AM   Result Value Ref Range    Magnesium 2.2 1.6 - 2.6 mg/dL   Comprehensive metabolic panel    Collection Time: 03/14/20  5:04 AM   Result Value Ref Range    Sodium 139 136 - 145 mmol/L    Potassium 3.9 3.5 - 5.1 mmol/L    Chloride 109 95 - 110 mmol/L    CO2 25 23 - 29 mmol/L    Glucose 110 70 - 110 mg/dL    BUN, Bld 37 (H) 8 - 23 mg/dL    Creatinine 1.7 (H) 0.5 - 1.4 mg/dL    Calcium 8.0 (L) 8.7 - 10.5 mg/dL    Total Protein 4.7 (L) 6.0 - 8.4 g/dL    Albumin 2.1 (L) 3.5 - 5.2 g/dL    Total Bilirubin 1.1 (H) 0.1 - 1.0 mg/dL    Alkaline Phosphatase 197 (H) 55 - 135 U/L    AST 19 10 - 40 U/L    ALT 12 10 - 44 U/L    Anion Gap 5 (L) 8 - 16 mmol/L    eGFR if African American 35.0 (A) >60 mL/min/1.73 m^2    eGFR if non  30.3 (A) >60 mL/min/1.73 m^2   Phosphorus    Collection Time: 03/14/20  5:04 AM   Result Value Ref Range    Phosphorus 4.4 2.7 - 4.5 mg/dL   CBC Without Differential    Collection Time: 03/14/20  5:04 AM   Result Value Ref Range    WBC 8.04 3.90 - 12.70 K/uL    RBC 2.54 (L) 4.00 - 5.40 M/uL    Hemoglobin 7.8 (L) 12.0 - 16.0 g/dL    Hematocrit 25.4 (L) 37.0 - 48.5 %    Mean Corpuscular Volume 100 (H) 82 - 98 fL    Mean Corpuscular Hemoglobin 30.7 27.0 - 31.0 pg    Mean Corpuscular Hemoglobin  Conc 30.7 (L) 32.0 - 36.0 g/dL    RDW 16.8 (H) 11.5 - 14.5 %    Platelets 139 (L) 150 - 350 K/uL    MPV 11.9 9.2 - 12.9 fL   TSH    Collection Time: 03/14/20  5:04 AM   Result Value Ref Range    TSH 3.770 0.340 - 5.600 uIU/mL   T4, free    Collection Time: 03/14/20  5:04 AM   Result Value Ref Range    Free T4 0.84 0.71 - 1.51 ng/dL   POCT glucose    Collection Time: 03/14/20  7:22 AM   Result Value Ref Range    POC Glucose 120 (H) 70 - 110   POCT glucose    Collection Time: 03/14/20 11:31 AM   Result Value Ref Range    POC Glucose 118 (H) 70 - 110   POCT glucose    Collection Time: 03/14/20  4:36 PM   Result Value Ref Range    POC Glucose 127 (H) 70 - 110   POCT glucose    Collection Time: 03/14/20  9:21 PM   Result Value Ref Range    POC Glucose 166 (H) 70 - 110   Magnesium    Collection Time: 03/15/20  4:24 AM   Result Value Ref Range    Magnesium 2.3 1.6 - 2.6 mg/dL   Comprehensive metabolic panel    Collection Time: 03/15/20  4:24 AM   Result Value Ref Range    Sodium 137 136 - 145 mmol/L    Potassium 4.0 3.5 - 5.1 mmol/L    Chloride 104 95 - 110 mmol/L    CO2 25 23 - 29 mmol/L    Glucose 111 (H) 70 - 110 mg/dL    BUN, Bld 41 (H) 8 - 23 mg/dL    Creatinine 2.0 (H) 0.5 - 1.4 mg/dL    Calcium 7.8 (L) 8.7 - 10.5 mg/dL    Total Protein 4.9 (L) 6.0 - 8.4 g/dL    Albumin 2.2 (L) 3.5 - 5.2 g/dL    Total Bilirubin 1.1 (H) 0.1 - 1.0 mg/dL    Alkaline Phosphatase 208 (H) 55 - 135 U/L    AST 20 10 - 40 U/L    ALT 12 10 - 44 U/L    Anion Gap 8 8 - 16 mmol/L    eGFR if African American 28.7 (A) >60 mL/min/1.73 m^2    eGFR if non African American 24.9 (A) >60 mL/min/1.73 m^2   Phosphorus    Collection Time: 03/15/20  4:24 AM   Result Value Ref Range    Phosphorus 4.5 2.7 - 4.5 mg/dL   CBC Without Differential    Collection Time: 03/15/20  4:24 AM   Result Value Ref Range    WBC 10.30 3.90 - 12.70 K/uL    RBC 2.31 (L) 4.00 - 5.40 M/uL    Hemoglobin 7.3 (L) 12.0 - 16.0 g/dL    Hematocrit 22.8 (L) 37.0 - 48.5 %    Mean  Corpuscular Volume 99 (H) 82 - 98 fL    Mean Corpuscular Hemoglobin 31.6 (H) 27.0 - 31.0 pg    Mean Corpuscular Hemoglobin Conc 32.0 32.0 - 36.0 g/dL    RDW 17.0 (H) 11.5 - 14.5 %    Platelets 163 150 - 350 K/uL    MPV 12.1 9.2 - 12.9 fL   Hematocrit    Collection Time: 03/15/20  8:40 AM   Result Value Ref Range    Hematocrit 22.1 (L) 37.0 - 48.5 %   Hemoglobin    Collection Time: 03/15/20  8:40 AM   Result Value Ref Range    Hemoglobin 6.9 (LL) 12.0 - 16.0 g/dL   Prepare RBC 1 Unit    Collection Time: 03/15/20  9:52 AM   Result Value Ref Range    UNIT NUMBER [id number]     Product Code A0610X85     DISPENSE STATUS TRANSFUSED     CODING SYSTEM MPPH149     Unit Blood Type Code 6200     Unit Blood Type A POS     Unit Expiration [id number]    Type & Screen    Collection Time: 03/15/20  9:56 AM   Result Value Ref Range    Group & Rh A POS     Indirect Bruno NEG    POCT glucose    Collection Time: 03/15/20 12:16 PM   Result Value Ref Range    POC Glucose 121 (H) 70 - 110   POCT glucose    Collection Time: 03/15/20  4:28 PM   Result Value Ref Range    POC Glucose 133 (H) 70 - 110   POCT glucose    Collection Time: 03/15/20  8:44 PM   Result Value Ref Range    POC Glucose 138 (H) 70 - 110   Urinalysis    Collection Time: 03/15/20 11:50 PM   Result Value Ref Range    Specimen UA Urine, Unspecified     Color, UA Yellow Yellow, Straw, Na    Appearance, UA Clear Clear    pH, UA 8.0 5.0 - 8.0    Specific Gravity, UA 1.015 1.005 - 1.030    Protein, UA 3+ (A) Negative    Glucose, UA 1+ (A) Negative    Ketones, UA 1+ (A) Negative    Bilirubin (UA) 3+ (A) Negative    Occult Blood UA 3+ (A) Negative    Nitrite, UA Positive (A) Negative    Urobilinogen, UA 4.0-6.0 (A) Negative EU/dL    Leukocytes, UA 3+ (A) Negative   Urine Culture High Risk    Collection Time: 03/15/20 11:50 PM   Result Value Ref Range    Urine Culture, Routine KLEBSIELLA PNEUMONIAE  >100,000 cfu/ml   (A)        Susceptibility    Klebsiella pneumoniae -  CULTURE, URINE     Amp/Sulbactam >16/8 Resistant mcg/mL     Amox/K Clav'ate <=8/4 Sensitive mcg/mL     Ceftriaxone <=8 Sensitive mcg/mL     Cefazolin <=8 Sensitive mcg/mL     Ciprofloxacin <=1 Sensitive mcg/mL     Cefepime <=4 Sensitive mcg/mL     Ertapenem <=2 Sensitive mcg/mL     Nitrofurantoin 64 Intermediate mcg/mL     Gentamicin <=4 Sensitive mcg/mL     Levofloxacin <=2 Sensitive mcg/mL     Meropenem <=1 Sensitive mcg/mL     Piperacillin/Tazo <=16 Sensitive mcg/mL     Trimeth/Sulfa <=2/38 Sensitive mcg/mL     Tetracycline <=4 Sensitive mcg/mL     Tobramycin <=4 Sensitive mcg/mL   Urinalysis Microscopic    Collection Time: 03/15/20 11:50 PM   Result Value Ref Range    RBC, UA 17 (H) 0 - 4 /hpf    WBC, UA 1 0 - 5 /hpf    Bacteria Negative None-Occ /hpf    Squam Epithel, UA 16 /hpf    Hyaline Casts, UA 38 (A) 0-1/lpf /lpf    Microscopic Comment SEE COMMENT    POCT glucose    Collection Time: 03/16/20 12:43 AM   Result Value Ref Range    POC Glucose 134 (H) 70 - 110   Magnesium    Collection Time: 03/16/20  5:25 AM   Result Value Ref Range    Magnesium 2.3 1.6 - 2.6 mg/dL   Comprehensive metabolic panel    Collection Time: 03/16/20  5:25 AM   Result Value Ref Range    Sodium 138 136 - 145 mmol/L    Potassium 4.0 3.5 - 5.1 mmol/L    Chloride 107 95 - 110 mmol/L    CO2 23 23 - 29 mmol/L    Glucose 122 (H) 70 - 110 mg/dL    BUN, Bld 39 (H) 8 - 23 mg/dL    Creatinine 1.8 (H) 0.5 - 1.4 mg/dL    Calcium 7.9 (L) 8.7 - 10.5 mg/dL    Total Protein 5.3 (L) 6.0 - 8.4 g/dL    Albumin 2.3 (L) 3.5 - 5.2 g/dL    Total Bilirubin 1.8 (H) 0.1 - 1.0 mg/dL    Alkaline Phosphatase 191 (H) 55 - 135 U/L    AST 22 10 - 40 U/L    ALT 13 10 - 44 U/L    Anion Gap 8 8 - 16 mmol/L    eGFR if African American 32.6 (A) >60 mL/min/1.73 m^2    eGFR if non  28.3 (A) >60 mL/min/1.73 m^2   Phosphorus    Collection Time: 03/16/20  5:25 AM   Result Value Ref Range    Phosphorus 4.1 2.7 - 4.5 mg/dL   Prealbumin    Collection Time:  03/16/20  5:25 AM   Result Value Ref Range    Prealbumin 11 (L) 20.0 - 43.0 mg/dL   Triglycerides    Collection Time: 03/16/20  5:25 AM   Result Value Ref Range    Triglycerides 55 30 - 150 mg/dL   CBC Without Differential    Collection Time: 03/16/20  5:25 AM   Result Value Ref Range    WBC 13.04 (H) 3.90 - 12.70 K/uL    RBC 2.82 (L) 4.00 - 5.40 M/uL    Hemoglobin 8.6 (L) 12.0 - 16.0 g/dL    Hematocrit 27.2 (L) 37.0 - 48.5 %    Mean Corpuscular Volume 97 82 - 98 fL    Mean Corpuscular Hemoglobin 30.5 27.0 - 31.0 pg    Mean Corpuscular Hemoglobin Conc 31.6 (L) 32.0 - 36.0 g/dL    RDW 17.3 (H) 11.5 - 14.5 %    Platelets 182 150 - 350 K/uL    MPV 11.8 9.2 - 12.9 fL   POCT glucose    Collection Time: 03/16/20  5:25 AM   Result Value Ref Range    POC Glucose 124 (H) 70 - 110   POCT glucose    Collection Time: 03/16/20 11:29 AM   Result Value Ref Range    POC Glucose 146 (H) 70 - 110   ISTAT PROCEDURE    Collection Time: 03/16/20  3:51 PM   Result Value Ref Range    POC PH 7.402 7.35 - 7.45    POC PCO2 39.9 35 - 45 mmHg    POC PO2 58 (LL) 80 - 100 mmHg    POC HCO3 24.9 24 - 28 mmol/L    POC BE 0 -2 to 2 mmol/L    POC SATURATED O2 90 (L) 95 - 100 %    POC Glucose 135 (H) 70 - 110 mg/dL    POC Sodium 137 136 - 145 mmol/L    POC Potassium 4.2 3.5 - 5.1 mmol/L    POC TCO2 26 23 - 27 mmol/L    POC Ionized Calcium 1.22 1.06 - 1.42 mmol/L    POC Hematocrit 24 (L) 36 - 54 %PCV    Sample ARTERIAL     Site RR     Allens Test Pass     DelSys Nasal Can     Mode SPONT     Flow 3.5     FiO2 36     Sp02 92    POCT glucose    Collection Time: 03/16/20  4:36 PM   Result Value Ref Range    POC Glucose 126 (H) 70 - 110   POCT glucose    Collection Time: 03/17/20  1:25 AM   Result Value Ref Range    POC Glucose 157 (H) 70 - 110   Magnesium    Collection Time: 03/17/20  4:23 AM   Result Value Ref Range    Magnesium 3.3 (H) 1.6 - 2.6 mg/dL   Comprehensive metabolic panel    Collection Time: 03/17/20  4:23 AM   Result Value Ref Range    Sodium  136 136 - 145 mmol/L    Potassium 4.6 3.5 - 5.1 mmol/L    Chloride 100 95 - 110 mmol/L    CO2 25 23 - 29 mmol/L    Glucose 677 (HH) 70 - 110 mg/dL    BUN, Bld 38 (H) 8 - 23 mg/dL    Creatinine 1.8 (H) 0.5 - 1.4 mg/dL    Calcium 7.9 (L) 8.7 - 10.5 mg/dL    Total Protein 4.9 (L) 6.0 - 8.4 g/dL    Albumin 2.1 (L) 3.5 - 5.2 g/dL    Total Bilirubin 1.1 (H) 0.1 - 1.0 mg/dL    Alkaline Phosphatase 169 (H) 55 - 135 U/L    AST 19 10 - 40 U/L    ALT 10 10 - 44 U/L    Anion Gap 11 8 - 16 mmol/L    eGFR if African American 32.6 (A) >60 mL/min/1.73 m^2    eGFR if non  28.3 (A) >60 mL/min/1.73 m^2   Phosphorus    Collection Time: 03/17/20  4:23 AM   Result Value Ref Range    Phosphorus 5.5 (H) 2.7 - 4.5 mg/dL   CBC Without Differential    Collection Time: 03/17/20  4:23 AM   Result Value Ref Range    WBC 11.14 3.90 - 12.70 K/uL    RBC 2.50 (L) 4.00 - 5.40 M/uL    Hemoglobin 7.9 (L) 12.0 - 16.0 g/dL    Hematocrit 24.8 (L) 37.0 - 48.5 %    Mean Corpuscular Volume 99 (H) 82 - 98 fL    Mean Corpuscular Hemoglobin 31.6 (H) 27.0 - 31.0 pg    Mean Corpuscular Hemoglobin Conc 31.9 (L) 32.0 - 36.0 g/dL    RDW 17.2 (H) 11.5 - 14.5 %    Platelets 184 150 - 350 K/uL    MPV 12.3 9.2 - 12.9 fL   POCT glucose    Collection Time: 03/17/20  5:40 AM   Result Value Ref Range    POC Glucose 140 (H) 70 - 110   POCT glucose    Collection Time: 03/17/20  6:24 AM   Result Value Ref Range    POC Glucose 138 (H) 70 - 110   POCT glucose    Collection Time: 03/17/20 11:33 AM   Result Value Ref Range    POC Glucose 146 (H) 70 - 110   Comprehensive metabolic panel    Collection Time: 03/17/20 12:51 PM   Result Value Ref Range    Sodium 138 136 - 145 mmol/L    Potassium 3.8 3.5 - 5.1 mmol/L    Chloride 102 95 - 110 mmol/L    CO2 26 23 - 29 mmol/L    Glucose 126 (H) 70 - 110 mg/dL    BUN, Bld 34 (H) 8 - 23 mg/dL    Creatinine 1.8 (H) 0.5 - 1.4 mg/dL    Calcium 8.2 (L) 8.7 - 10.5 mg/dL    Total Protein 5.0 (L) 6.0 - 8.4 g/dL    Albumin 2.1 (L)  3.5 - 5.2 g/dL    Total Bilirubin 1.2 (H) 0.1 - 1.0 mg/dL    Alkaline Phosphatase 167 (H) 55 - 135 U/L    AST 19 10 - 40 U/L    ALT 11 10 - 44 U/L    Anion Gap 10 8 - 16 mmol/L    eGFR if African American 32.6 (A) >60 mL/min/1.73 m^2    eGFR if non  28.3 (A) >60 mL/min/1.73 m^2   Magnesium    Collection Time: 03/17/20 12:51 PM   Result Value Ref Range    Magnesium 2.2 1.6 - 2.6 mg/dL   Phosphorus    Collection Time: 03/17/20 12:51 PM   Result Value Ref Range    Phosphorus 3.7 2.7 - 4.5 mg/dL   POCT glucose    Collection Time: 03/17/20  4:49 PM   Result Value Ref Range    POC Glucose 153 (H) 70 - 110   POCT glucose    Collection Time: 03/17/20  8:34 PM   Result Value Ref Range    POC Glucose 140 (H) 70 - 110   Magnesium    Collection Time: 03/18/20  4:53 AM   Result Value Ref Range    Magnesium 2.3 1.6 - 2.6 mg/dL   Comprehensive metabolic panel    Collection Time: 03/18/20  4:53 AM   Result Value Ref Range    Sodium 137 136 - 145 mmol/L    Potassium 3.5 3.5 - 5.1 mmol/L    Chloride 101 95 - 110 mmol/L    CO2 29 23 - 29 mmol/L    Glucose 141 (H) 70 - 110 mg/dL    BUN, Bld 28 (H) 8 - 23 mg/dL    Creatinine 1.8 (H) 0.5 - 1.4 mg/dL    Calcium 8.1 (L) 8.7 - 10.5 mg/dL    Total Protein 5.0 (L) 6.0 - 8.4 g/dL    Albumin 2.0 (L) 3.5 - 5.2 g/dL    Total Bilirubin 1.4 (H) 0.1 - 1.0 mg/dL    Alkaline Phosphatase 160 (H) 55 - 135 U/L    AST 17 10 - 40 U/L    ALT 9 (L) 10 - 44 U/L    Anion Gap 7 (L) 8 - 16 mmol/L    eGFR if African American 32.6 (A) >60 mL/min/1.73 m^2    eGFR if non  28.3 (A) >60 mL/min/1.73 m^2   Phosphorus    Collection Time: 03/18/20  4:53 AM   Result Value Ref Range    Phosphorus 4.1 2.7 - 4.5 mg/dL   CBC Without Differential    Collection Time: 03/18/20  4:53 AM   Result Value Ref Range    WBC 11.19 3.90 - 12.70 K/uL    RBC 2.46 (L) 4.00 - 5.40 M/uL    Hemoglobin 7.5 (L) 12.0 - 16.0 g/dL    Hematocrit 23.8 (L) 37.0 - 48.5 %    Mean Corpuscular Volume 97 82 - 98 fL    Mean  Corpuscular Hemoglobin 30.5 27.0 - 31.0 pg    Mean Corpuscular Hemoglobin Conc 31.5 (L) 32.0 - 36.0 g/dL    RDW 16.7 (H) 11.5 - 14.5 %    Platelets 231 150 - 350 K/uL    MPV 11.9 9.2 - 12.9 fL   POCT glucose    Collection Time: 03/18/20 12:02 PM   Result Value Ref Range    POC Glucose 144 (H) 70 - 110   POCT glucose    Collection Time: 03/18/20  5:19 PM   Result Value Ref Range    POC Glucose 131 (H) 70 - 110   POCT glucose    Collection Time: 03/18/20  8:26 PM   Result Value Ref Range    POC Glucose 135 (H) 70 - 110   Magnesium    Collection Time: 03/19/20  4:25 AM   Result Value Ref Range    Magnesium 2.3 1.6 - 2.6 mg/dL   Comprehensive metabolic panel    Collection Time: 03/19/20  4:25 AM   Result Value Ref Range    Sodium 135 (L) 136 - 145 mmol/L    Potassium 3.2 (L) 3.5 - 5.1 mmol/L    Chloride 102 95 - 110 mmol/L    CO2 26 23 - 29 mmol/L    Glucose 153 (H) 70 - 110 mg/dL    BUN, Bld 26 (H) 8 - 23 mg/dL    Creatinine 1.8 (H) 0.5 - 1.4 mg/dL    Calcium 7.9 (L) 8.7 - 10.5 mg/dL    Total Protein 5.4 (L) 6.0 - 8.4 g/dL    Albumin 2.0 (L) 3.5 - 5.2 g/dL    Total Bilirubin 1.4 (H) 0.1 - 1.0 mg/dL    Alkaline Phosphatase 177 (H) 55 - 135 U/L    AST 17 10 - 40 U/L    ALT 11 10 - 44 U/L    Anion Gap 7 (L) 8 - 16 mmol/L    eGFR if African American 32.6 (A) >60 mL/min/1.73 m^2    eGFR if non  28.3 (A) >60 mL/min/1.73 m^2   Phosphorus    Collection Time: 03/19/20  4:25 AM   Result Value Ref Range    Phosphorus 3.7 2.7 - 4.5 mg/dL   CBC Without Differential    Collection Time: 03/19/20  4:25 AM   Result Value Ref Range    WBC 11.56 3.90 - 12.70 K/uL    RBC 2.49 (L) 4.00 - 5.40 M/uL    Hemoglobin 7.6 (L) 12.0 - 16.0 g/dL    Hematocrit 24.2 (L) 37.0 - 48.5 %    Mean Corpuscular Volume 97 82 - 98 fL    Mean Corpuscular Hemoglobin 30.5 27.0 - 31.0 pg    Mean Corpuscular Hemoglobin Conc 31.4 (L) 32.0 - 36.0 g/dL    RDW 16.8 (H) 11.5 - 14.5 %    Platelets 302 150 - 350 K/uL    MPV 11.9 9.2 - 12.9 fL   Basic  metabolic panel    Collection Time: 03/19/20  4:25 AM   Result Value Ref Range    Sodium 135 (L) 136 - 145 mmol/L    Potassium 3.2 (L) 3.5 - 5.1 mmol/L    Chloride 102 95 - 110 mmol/L    CO2 26 23 - 29 mmol/L    Glucose 153 (H) 70 - 110 mg/dL    BUN, Bld 26 (H) 8 - 23 mg/dL    Creatinine 1.8 (H) 0.5 - 1.4 mg/dL    Calcium 7.9 (L) 8.7 - 10.5 mg/dL    Anion Gap 7 (L) 8 - 16 mmol/L    eGFR if African American 32.6 (A) >60 mL/min/1.73 m^2    eGFR if non  28.3 (A) >60 mL/min/1.73 m^2   Brain natriuretic peptide    Collection Time: 03/19/20  4:25 AM   Result Value Ref Range    BNP 1,253 (H) 0 - 99 pg/mL   POCT glucose    Collection Time: 03/19/20 11:26 AM   Result Value Ref Range    POC Glucose 117 (H) 70 - 110   Echo Color Flow Doppler? No    Collection Time: 03/19/20  2:44 PM   Result Value Ref Range    BSA 1.74 m2    TDI SEPTAL 0.11 m/s    LV LATERAL E/E' RATIO 8.18 m/s    LV SEPTAL E/E' RATIO 8.18 m/s    Right Atrial Pressure (from IVC) 8 mmHg    AORTIC VALVE CUSP SEPERATION 1.93 cm    TDI LATERAL 0.11 m/s    PV PEAK VELOCITY 171.11 cm/s    LVIDD 4.75 3.5 - 6.0 cm    IVS 1.28 (A) 0.6 - 1.1 cm    PW 1.26 (A) 0.6 - 1.1 cm    Ao root annulus 2.87 cm    LVIDS 2.53 2.1 - 4.0 cm    FS 47 28 - 44 %    LV mass 233.77 g    LA size 3.23 cm    RVDD 334.00 cm    Left Ventricle Relative Wall Thickness 0.53 cm    AV mean gradient 8 mmHg    AV valve area 2.74 cm2    AV Velocity Ratio 72.90     AV index (prosthetic) 0.86     E/A ratio 0.96     Mean e' 0.11 m/s    E wave decelartion time 145.04 msec    IVRT 82.88 msec    LVOT diameter 2.01 cm    LVOT area 3.2 cm2    LVOT peak durga 143.61 m/s    LVOT peak VTI 27.62 cm    Ao peak durga 1.97 m/s    Ao VTI 31.97 cm    LVOT stroke volume 87.60 cm3    AV peak gradient 16 mmHg    TV rest pulmonary artery pressure 47 mmHg    E/E' ratio 8.18 m/s    MV Peak E Durga 0.90 m/s    TR Max Durga 3.13 m/s    MV Peak A Durga 0.94 m/s    LV Systolic Volume 47.34 mL    LV Systolic Volume Index  26.7 mL/m2    LV Diastolic Volume 109.68 mL    LV Diastolic Volume Index 61.76 mL/m2    LV Mass Index 132 g/m2    Triscuspid Valve Regurgitation Peak Gradient 39 mmHg   Prepare RBC 1 Unit    Collection Time: 03/19/20  5:05 PM   Result Value Ref Range    UNIT NUMBER [id number]     Product Code [id number]     DISPENSE STATUS TRANSFUSED     CODING SYSTEM [id number]     Unit Blood Type Code 6200     Unit Blood Type A POS     Unit Expiration [id number]    C-reactive protein    Collection Time: 03/19/20  5:35 PM   Result Value Ref Range    CRP 25.73 (H) 0.00 - 0.75 mg/dL   Procalcitonin    Collection Time: 03/19/20  5:35 PM   Result Value Ref Range    Procalcitonin 1.26 (H) 0.00 - 0.50 ng/mL   Sedimentation rate    Collection Time: 03/19/20  5:35 PM   Result Value Ref Range    Sed Rate 84 (H) 0 - 20 mm/Hr   Potassium    Collection Time: 03/19/20  5:35 PM   Result Value Ref Range    Potassium 3.9 3.5 - 5.1 mmol/L   Type & Screen    Collection Time: 03/19/20  5:35 PM   Result Value Ref Range    Group & Rh A POS     Indirect Bruno NEG    POCT glucose    Collection Time: 03/19/20  6:07 PM   Result Value Ref Range    POC Glucose 111 (H) 70 - 110   POCT glucose    Collection Time: 03/19/20 11:48 PM   Result Value Ref Range    POC Glucose 167 (H) 70 - 110   POCT glucose    Collection Time: 03/20/20  5:40 AM   Result Value Ref Range    POC Glucose 115 (H) 70 - 110   CBC Without Differential    Collection Time: 03/20/20  5:57 AM   Result Value Ref Range    WBC 11.93 3.90 - 12.70 K/uL    RBC 2.39 (L) 4.00 - 5.40 M/uL    Hemoglobin 8.1 (L) 12.0 - 16.0 g/dL    Hematocrit 27.7 (L) 37.0 - 48.5 %    Mean Corpuscular Volume 116 (H) 82 - 98 fL    Mean Corpuscular Hemoglobin 33.9 (H) 27.0 - 31.0 pg    Mean Corpuscular Hemoglobin Conc 29.2 (L) 32.0 - 36.0 g/dL    RDW 17.5 (H) 11.5 - 14.5 %    Platelets 340 150 - 350 K/uL    MPV 11.9 9.2 - 12.9 fL   Magnesium    Collection Time: 03/20/20 10:27 AM   Result Value Ref Range    Magnesium 2.0  1.6 - 2.6 mg/dL   Comprehensive metabolic panel    Collection Time: 03/20/20 10:27 AM   Result Value Ref Range    Sodium 135 (L) 136 - 145 mmol/L    Potassium 4.4 3.5 - 5.1 mmol/L    Chloride 99 95 - 110 mmol/L    CO2 26 23 - 29 mmol/L    Glucose 140 (H) 70 - 110 mg/dL    BUN, Bld 29 (H) 8 - 23 mg/dL    Creatinine 1.7 (H) 0.5 - 1.4 mg/dL    Calcium 8.2 (L) 8.7 - 10.5 mg/dL    Total Protein 5.4 (L) 6.0 - 8.4 g/dL    Albumin 2.0 (L) 3.5 - 5.2 g/dL    Total Bilirubin 1.3 (H) 0.1 - 1.0 mg/dL    Alkaline Phosphatase 216 (H) 55 - 135 U/L    AST 23 10 - 40 U/L    ALT 12 10 - 44 U/L    Anion Gap 10 8 - 16 mmol/L    eGFR if African American 35.0 (A) >60 mL/min/1.73 m^2    eGFR if non  30.3 (A) >60 mL/min/1.73 m^2   Phosphorus    Collection Time: 03/20/20 10:27 AM   Result Value Ref Range    Phosphorus 2.7 2.7 - 4.5 mg/dL   Brain natriuretic peptide    Collection Time: 03/20/20 10:27 AM   Result Value Ref Range    BNP 1,502 (H) 0 - 99 pg/mL   POCT glucose    Collection Time: 03/20/20  3:41 PM   Result Value Ref Range    POC Glucose 142 (H) 70 - 110   POCT glucose    Collection Time: 03/20/20 11:51 PM   Result Value Ref Range    POC Glucose 137 (H) 70 - 110   Magnesium    Collection Time: 03/21/20  4:10 AM   Result Value Ref Range    Magnesium 2.2 1.6 - 2.6 mg/dL   Comprehensive metabolic panel    Collection Time: 03/21/20  4:10 AM   Result Value Ref Range    Sodium 137 136 - 145 mmol/L    Potassium 4.0 3.5 - 5.1 mmol/L    Chloride 100 95 - 110 mmol/L    CO2 28 23 - 29 mmol/L    Glucose 129 (H) 70 - 110 mg/dL    BUN, Bld 35 (H) 8 - 23 mg/dL    Creatinine 1.8 (H) 0.5 - 1.4 mg/dL    Calcium 8.3 (L) 8.7 - 10.5 mg/dL    Total Protein 5.4 (L) 6.0 - 8.4 g/dL    Albumin 2.0 (L) 3.5 - 5.2 g/dL    Total Bilirubin 1.2 (H) 0.1 - 1.0 mg/dL    Alkaline Phosphatase 206 (H) 55 - 135 U/L    AST 24 10 - 40 U/L    ALT 13 10 - 44 U/L    Anion Gap 9 8 - 16 mmol/L    eGFR if African American 32.6 (A) >60 mL/min/1.73 m^2    eGFR if  non  28.3 (A) >60 mL/min/1.73 m^2   Phosphorus    Collection Time: 03/21/20  4:10 AM   Result Value Ref Range    Phosphorus 3.3 2.7 - 4.5 mg/dL   CBC Without Differential    Collection Time: 03/21/20  4:10 AM   Result Value Ref Range    WBC 11.26 3.90 - 12.70 K/uL    RBC 2.53 (L) 4.00 - 5.40 M/uL    Hemoglobin 7.7 (L) 12.0 - 16.0 g/dL    Hematocrit 24.3 (L) 37.0 - 48.5 %    Mean Corpuscular Volume 96 82 - 98 fL    Mean Corpuscular Hemoglobin 30.4 27.0 - 31.0 pg    Mean Corpuscular Hemoglobin Conc 31.7 (L) 32.0 - 36.0 g/dL    RDW 16.6 (H) 11.5 - 14.5 %    Platelets 391 (H) 150 - 350 K/uL    MPV 11.4 9.2 - 12.9 fL   Magnesium    Collection Time: 03/22/20  3:42 AM   Result Value Ref Range    Magnesium 2.2 1.6 - 2.6 mg/dL   Comprehensive metabolic panel    Collection Time: 03/22/20  3:42 AM   Result Value Ref Range    Sodium 136 136 - 145 mmol/L    Potassium 4.0 3.5 - 5.1 mmol/L    Chloride 101 95 - 110 mmol/L    CO2 27 23 - 29 mmol/L    Glucose 117 (H) 70 - 110 mg/dL    BUN, Bld 36 (H) 8 - 23 mg/dL    Creatinine 1.8 (H) 0.5 - 1.4 mg/dL    Calcium 8.1 (L) 8.7 - 10.5 mg/dL    Total Protein 5.3 (L) 6.0 - 8.4 g/dL    Albumin 2.0 (L) 3.5 - 5.2 g/dL    Total Bilirubin 1.1 (H) 0.1 - 1.0 mg/dL    Alkaline Phosphatase 211 (H) 55 - 135 U/L    AST 28 10 - 40 U/L    ALT 16 10 - 44 U/L    Anion Gap 8 8 - 16 mmol/L    eGFR if African American 32.6 (A) >60 mL/min/1.73 m^2    eGFR if non  28.3 (A) >60 mL/min/1.73 m^2   Phosphorus    Collection Time: 03/22/20  3:42 AM   Result Value Ref Range    Phosphorus 3.6 2.7 - 4.5 mg/dL   CBC Without Differential    Collection Time: 03/22/20  3:42 AM   Result Value Ref Range    WBC 10.15 3.90 - 12.70 K/uL    RBC 2.32 (L) 4.00 - 5.40 M/uL    Hemoglobin 7.0 (L) 12.0 - 16.0 g/dL    Hematocrit 22.5 (L) 37.0 - 48.5 %    Mean Corpuscular Volume 97 82 - 98 fL    Mean Corpuscular Hemoglobin 30.2 27.0 - 31.0 pg    Mean Corpuscular Hemoglobin Conc 31.1 (L) 32.0 - 36.0 g/dL     RDW 16.7 (H) 11.5 - 14.5 %    Platelets 391 (H) 150 - 350 K/uL    MPV 11.5 9.2 - 12.9 fL   Magnesium    Collection Time: 03/23/20  4:49 AM   Result Value Ref Range    Magnesium 1.9 1.6 - 2.6 mg/dL   Comprehensive metabolic panel    Collection Time: 03/23/20  4:49 AM   Result Value Ref Range    Sodium 136 136 - 145 mmol/L    Potassium 4.8 3.5 - 5.1 mmol/L    Chloride 103 95 - 110 mmol/L    CO2 28 23 - 29 mmol/L    Glucose 79 70 - 110 mg/dL    BUN, Bld 36 (H) 8 - 23 mg/dL    Creatinine 1.8 (H) 0.5 - 1.4 mg/dL    Calcium 8.6 (L) 8.7 - 10.5 mg/dL    Total Protein 5.5 (L) 6.0 - 8.4 g/dL    Albumin 2.1 (L) 3.5 - 5.2 g/dL    Total Bilirubin 1.2 (H) 0.1 - 1.0 mg/dL    Alkaline Phosphatase 301 (H) 55 - 135 U/L    AST 40 10 - 40 U/L    ALT 19 10 - 44 U/L    Anion Gap 5 (L) 8 - 16 mmol/L    eGFR if African American 32.6 (A) >60 mL/min/1.73 m^2    eGFR if non  28.3 (A) >60 mL/min/1.73 m^2   Phosphorus    Collection Time: 03/23/20  4:49 AM   Result Value Ref Range    Phosphorus 4.9 (H) 2.7 - 4.5 mg/dL   Prealbumin    Collection Time: 03/23/20  4:49 AM   Result Value Ref Range    Prealbumin 11 (L) 20.0 - 43.0 mg/dL   Triglycerides    Collection Time: 03/23/20  4:49 AM   Result Value Ref Range    Triglycerides 105 30 - 150 mg/dL   CBC Without Differential    Collection Time: 03/23/20  4:49 AM   Result Value Ref Range    WBC 10.42 3.90 - 12.70 K/uL    RBC 2.82 (L) 4.00 - 5.40 M/uL    Hemoglobin 8.5 (L) 12.0 - 16.0 g/dL    Hematocrit 26.5 (L) 37.0 - 48.5 %    Mean Corpuscular Volume 94 82 - 98 fL    Mean Corpuscular Hemoglobin 30.1 27.0 - 31.0 pg    Mean Corpuscular Hemoglobin Conc 32.1 32.0 - 36.0 g/dL    RDW 17.7 (H) 11.5 - 14.5 %    Platelets 456 (H) 150 - 350 K/uL    MPV 10.8 9.2 - 12.9 fL   POCT glucose    Collection Time: 03/23/20  8:55 PM   Result Value Ref Range    POC Glucose 92 70 - 110   Magnesium    Collection Time: 03/24/20  4:32 AM   Result Value Ref Range    Magnesium 1.6 1.6 - 2.6 mg/dL    Comprehensive metabolic panel    Collection Time: 03/24/20  4:32 AM   Result Value Ref Range    Sodium 138 136 - 145 mmol/L    Potassium 4.5 3.5 - 5.1 mmol/L    Chloride 101 95 - 110 mmol/L    CO2 26 23 - 29 mmol/L    Glucose 81 70 - 110 mg/dL    BUN, Bld 35 (H) 8 - 23 mg/dL    Creatinine 2.1 (H) 0.5 - 1.4 mg/dL    Calcium 8.7 8.7 - 10.5 mg/dL    Total Protein 5.6 (L) 6.0 - 8.4 g/dL    Albumin 2.3 (L) 3.5 - 5.2 g/dL    Total Bilirubin 1.1 (H) 0.1 - 1.0 mg/dL    Alkaline Phosphatase 310 (H) 55 - 135 U/L    AST 36 10 - 40 U/L    ALT 22 10 - 44 U/L    Anion Gap 11 8 - 16 mmol/L    eGFR if African American 27.1 (A) >60 mL/min/1.73 m^2    eGFR if non  23.5 (A) >60 mL/min/1.73 m^2   Phosphorus    Collection Time: 03/24/20  4:32 AM   Result Value Ref Range    Phosphorus 5.6 (H) 2.7 - 4.5 mg/dL   CBC Without Differential    Collection Time: 03/24/20  4:32 AM   Result Value Ref Range    WBC 9.45 3.90 - 12.70 K/uL    RBC 2.78 (L) 4.00 - 5.40 M/uL    Hemoglobin 8.4 (L) 12.0 - 16.0 g/dL    Hematocrit 26.5 (L) 37.0 - 48.5 %    Mean Corpuscular Volume 95 82 - 98 fL    Mean Corpuscular Hemoglobin 30.2 27.0 - 31.0 pg    Mean Corpuscular Hemoglobin Conc 31.7 (L) 32.0 - 36.0 g/dL    RDW 16.6 (H) 11.5 - 14.5 %    Platelets 529 (H) 150 - 350 K/uL    MPV 10.9 9.2 - 12.9 fL   Urine Culture High Risk    Collection Time: 03/24/20  1:43 PM   Result Value Ref Range    Urine Culture, Routine PSEUDOMONAS AERUGINOSA  >100,000 cfu/ml   (A)     Urine Culture, Routine ENTEROCOCCUS FAECALIS  > 100,000 cfu/ml   (A)        Susceptibility    Enterococcus faecalis - CULTURE, URINE     Ampicillin <=2 Sensitive mcg/mL     Nitrofurantoin <=32 Sensitive mcg/mL     Tetracycline >8 Resistant mcg/mL     Vancomycin 1 Sensitive mcg/mL    Pseudomonas aeruginosa - CULTURE, URINE     Ciprofloxacin <=1 Sensitive mcg/mL     Cefepime <=4 Sensitive mcg/mL     Gentamicin <=4 Sensitive mcg/mL     Meropenem 2 Sensitive mcg/mL     Piperacillin/Tazo <=16  Sensitive mcg/mL     Tobramycin <=4 Sensitive mcg/mL   Magnesium    Collection Time: 03/25/20  5:05 AM   Result Value Ref Range    Magnesium 1.5 (L) 1.6 - 2.6 mg/dL   Comprehensive metabolic panel    Collection Time: 03/25/20  5:05 AM   Result Value Ref Range    Sodium 137 136 - 145 mmol/L    Potassium 4.6 3.5 - 5.1 mmol/L    Chloride 100 95 - 110 mmol/L    CO2 27 23 - 29 mmol/L    Glucose 85 70 - 110 mg/dL    BUN, Bld 33 (H) 8 - 23 mg/dL    Creatinine 2.2 (H) 0.5 - 1.4 mg/dL    Calcium 8.9 8.7 - 10.5 mg/dL    Total Protein 6.0 6.0 - 8.4 g/dL    Albumin 2.5 (L) 3.5 - 5.2 g/dL    Total Bilirubin 0.8 0.1 - 1.0 mg/dL    Alkaline Phosphatase 323 (H) 55 - 135 U/L    AST 35 10 - 40 U/L    ALT 22 10 - 44 U/L    Anion Gap 10 8 - 16 mmol/L    eGFR if African American 25.6 (A) >60 mL/min/1.73 m^2    eGFR if non  22.2 (A) >60 mL/min/1.73 m^2   Phosphorus    Collection Time: 03/25/20  5:05 AM   Result Value Ref Range    Phosphorus 4.8 (H) 2.7 - 4.5 mg/dL   CBC Without Differential    Collection Time: 03/25/20  5:05 AM   Result Value Ref Range    WBC 11.86 3.90 - 12.70 K/uL    RBC 2.74 (L) 4.00 - 5.40 M/uL    Hemoglobin 8.3 (L) 12.0 - 16.0 g/dL    Hematocrit 26.5 (L) 37.0 - 48.5 %    Mean Corpuscular Volume 97 82 - 98 fL    Mean Corpuscular Hemoglobin 30.3 27.0 - 31.0 pg    Mean Corpuscular Hemoglobin Conc 31.3 (L) 32.0 - 36.0 g/dL    RDW 15.9 (H) 11.5 - 14.5 %    Platelets 523 (H) 150 - 350 K/uL    MPV 10.6 9.2 - 12.9 fL   Procalcitonin    Collection Time: 03/25/20  3:16 PM   Result Value Ref Range    Procalcitonin 1.04 (H) 0.00 - 0.50 ng/mL   Magnesium    Collection Time: 03/26/20  5:07 AM   Result Value Ref Range    Magnesium 1.4 (L) 1.6 - 2.6 mg/dL   Comprehensive metabolic panel    Collection Time: 03/26/20  5:07 AM   Result Value Ref Range    Sodium 135 (L) 136 - 145 mmol/L    Potassium 4.8 3.5 - 5.1 mmol/L    Chloride 102 95 - 110 mmol/L    CO2 24 23 - 29 mmol/L    Glucose 126 (H) 70 - 110 mg/dL    BUN,  Bld 30 (H) 8 - 23 mg/dL    Creatinine 2.3 (H) 0.5 - 1.4 mg/dL    Calcium 8.8 8.7 - 10.5 mg/dL    Total Protein 5.5 (L) 6.0 - 8.4 g/dL    Albumin 2.3 (L) 3.5 - 5.2 g/dL    Total Bilirubin 0.8 0.1 - 1.0 mg/dL    Alkaline Phosphatase 248 (H) 55 - 135 U/L    AST 25 10 - 40 U/L    ALT 19 10 - 44 U/L    Anion Gap 9 8 - 16 mmol/L    eGFR if African American 24.3 (A) >60 mL/min/1.73 m^2    eGFR if non  21.1 (A) >60 mL/min/1.73 m^2   Phosphorus    Collection Time: 03/26/20  5:07 AM   Result Value Ref Range    Phosphorus 5.0 (H) 2.7 - 4.5 mg/dL   CBC Without Differential    Collection Time: 03/26/20  5:07 AM   Result Value Ref Range    WBC 10.66 3.90 - 12.70 K/uL    RBC 2.49 (L) 4.00 - 5.40 M/uL    Hemoglobin 7.5 (L) 12.0 - 16.0 g/dL    Hematocrit 24.2 (L) 37.0 - 48.5 %    Mean Corpuscular Volume 97 82 - 98 fL    Mean Corpuscular Hemoglobin 30.1 27.0 - 31.0 pg    Mean Corpuscular Hemoglobin Conc 31.0 (L) 32.0 - 36.0 g/dL    RDW 15.8 (H) 11.5 - 14.5 %    Platelets 472 (H) 150 - 350 K/uL    MPV 10.4 9.2 - 12.9 fL       IMAGING:    Renal ultrasound 3/25/20 Images independently reviewed by me      1.  Lee catheter partially decompressing the bladder with trace adjacent debris.    2.  No hydronephrosis.    3.  Small kidneys with increased parenchymal echotexture suggesting mild medical renal disease.    4.  Dense medullary pyramidal calcifications seen throughout both kidneys in keeping with medullary nephrocalcinosis (or possibly diffuse stone disease), consider correlation with the clinical history.    5.  Hepatic parenchymal echotexture consistent with hepatic steatosis.      Assessment/Diagnosis:       Shara Hutchins is a 69 y.o. female with bilateral non obstructing stones and gross hematuria secondary to uti, lee trauma, possible radiation cystitis, bladder tumor or recurrent vulvar cancer.     Plans:    - Continuous bladder irrigation clamped and patient remains with clear urine. Ok to discharge with lee  in place. Defer down-sizing at this time given her significant discomfort and history of lee trauma in the past. Will RTC in 3 weeks for catheter exchange and to discuss cystoscopy.

## 2020-03-26 NOTE — PLAN OF CARE
03/25/20 1952   Patient Assessment/Suction   Respiratory Effort Unlabored   All Lung Fields Breath Sounds clear   Rhythm/Pattern, Respiratory pattern regular   PRE-TX-O2   O2 Device (Oxygen Therapy) nasal cannula  (on standby)   SpO2 (!) 92 %   Pulse 76   Resp 16   Aerosol Therapy   $ Aerosol Therapy Charges PRN treatment not required   Respiratory Evaluation   $ Care Plan Tech Time 15 min   Evaluation For Re-Eval 5+ day   Admitting Diagnosis inflammatory bowel disease

## 2020-03-26 NOTE — PLAN OF CARE
Patient still with complaints of bladder spasms throughout the night, but seemed to better controlled. Urine is slightly pink to light yellow. Moderate sized clot pulled out of lee at the beginning of the shift, but no clots seen after that.     Problem: Adult Inpatient Plan of Care  Goal: Plan of Care Review  Outcome: Ongoing, Progressing  Goal: Optimal Comfort and Wellbeing  Outcome: Ongoing, Progressing  Intervention: Provide Person-Centered Care  Flowsheets (Taken 3/26/2020 0500)  Trust Relationship/Rapport: care explained; choices provided; questions answered; questions encouraged     Problem: Infection  Goal: Infection Symptom Resolution  Outcome: Ongoing, Progressing  Intervention: Prevent or Manage Infection  Flowsheets (Taken 3/26/2020 0500)  Infection Management: aseptic technique maintained

## 2020-03-26 NOTE — PLAN OF CARE
Vital signs, cardiac and lab monitoring.   Possible discharge in am . Increase activity as tolerated. Bed alarm on. Watch for falls. Watch for sign and symptoms of bleeding. Blood administration. Breathing treatments and adjust oxygen as needed. Strict I & O. Daily weights.

## 2020-03-27 VITALS
HEART RATE: 85 BPM | DIASTOLIC BLOOD PRESSURE: 78 MMHG | SYSTOLIC BLOOD PRESSURE: 139 MMHG | BODY MASS INDEX: 18.91 KG/M2 | TEMPERATURE: 98 F | OXYGEN SATURATION: 97 % | RESPIRATION RATE: 19 BRPM | HEIGHT: 68 IN | WEIGHT: 124.75 LBS

## 2020-03-27 LAB
ALBUMIN SERPL BCP-MCNC: 2.4 G/DL (ref 3.5–5.2)
ALP SERPL-CCNC: 264 U/L (ref 55–135)
ALT SERPL W/O P-5'-P-CCNC: 18 U/L (ref 10–44)
ANION GAP SERPL CALC-SCNC: 7 MMOL/L (ref 8–16)
AST SERPL-CCNC: 22 U/L (ref 10–40)
BILIRUB SERPL-MCNC: 0.8 MG/DL (ref 0.1–1)
BUN SERPL-MCNC: 29 MG/DL (ref 8–23)
CALCIUM SERPL-MCNC: 9 MG/DL (ref 8.7–10.5)
CHLORIDE SERPL-SCNC: 106 MMOL/L (ref 95–110)
CO2 SERPL-SCNC: 24 MMOL/L (ref 23–29)
CREAT SERPL-MCNC: 2.1 MG/DL (ref 0.5–1.4)
ERYTHROCYTE [DISTWIDTH] IN BLOOD BY AUTOMATED COUNT: 15.5 % (ref 11.5–14.5)
EST. GFR  (AFRICAN AMERICAN): 27.1 ML/MIN/1.73 M^2
EST. GFR  (NON AFRICAN AMERICAN): 23.5 ML/MIN/1.73 M^2
GLUCOSE SERPL-MCNC: 97 MG/DL (ref 70–110)
HCT VFR BLD AUTO: 31.3 % (ref 37–48.5)
HGB BLD-MCNC: 10.1 G/DL (ref 12–16)
MAGNESIUM SERPL-MCNC: 1.9 MG/DL (ref 1.6–2.6)
MCH RBC QN AUTO: 30.4 PG (ref 27–31)
MCHC RBC AUTO-ENTMCNC: 32.3 G/DL (ref 32–36)
MCV RBC AUTO: 94 FL (ref 82–98)
PHOSPHATE SERPL-MCNC: 4.4 MG/DL (ref 2.7–4.5)
PLATELET # BLD AUTO: 439 K/UL (ref 150–350)
PMV BLD AUTO: 10.4 FL (ref 9.2–12.9)
POTASSIUM SERPL-SCNC: 5 MMOL/L (ref 3.5–5.1)
PROT SERPL-MCNC: 5.7 G/DL (ref 6–8.4)
RBC # BLD AUTO: 3.32 M/UL (ref 4–5.4)
SODIUM SERPL-SCNC: 137 MMOL/L (ref 136–145)
WBC # BLD AUTO: 10.85 K/UL (ref 3.9–12.7)

## 2020-03-27 PROCEDURE — 80053 COMPREHEN METABOLIC PANEL: CPT

## 2020-03-27 PROCEDURE — 83735 ASSAY OF MAGNESIUM: CPT

## 2020-03-27 PROCEDURE — 25000003 PHARM REV CODE 250: Performed by: STUDENT IN AN ORGANIZED HEALTH CARE EDUCATION/TRAINING PROGRAM

## 2020-03-27 PROCEDURE — 63600175 PHARM REV CODE 636 W HCPCS: Performed by: INTERNAL MEDICINE

## 2020-03-27 PROCEDURE — 84100 ASSAY OF PHOSPHORUS: CPT

## 2020-03-27 PROCEDURE — 94761 N-INVAS EAR/PLS OXIMETRY MLT: CPT

## 2020-03-27 PROCEDURE — 25000003 PHARM REV CODE 250: Performed by: INTERNAL MEDICINE

## 2020-03-27 PROCEDURE — 99900035 HC TECH TIME PER 15 MIN (STAT)

## 2020-03-27 PROCEDURE — 25000003 PHARM REV CODE 250: Performed by: FAMILY MEDICINE

## 2020-03-27 PROCEDURE — 25000003 PHARM REV CODE 250: Performed by: UROLOGY

## 2020-03-27 PROCEDURE — 25000003 PHARM REV CODE 250: Performed by: HOSPITALIST

## 2020-03-27 PROCEDURE — 85027 COMPLETE CBC AUTOMATED: CPT

## 2020-03-27 RX ADMIN — HYDROCODONE BITARTRATE AND ACETAMINOPHEN 2 TABLET: 5; 325 TABLET ORAL at 11:03

## 2020-03-27 RX ADMIN — DIPHENHYDRAMINE HYDROCHLORIDE 6.25 MG: 50 INJECTION, SOLUTION INTRAMUSCULAR; INTRAVENOUS at 11:03

## 2020-03-27 RX ADMIN — HYDROCODONE BITARTRATE AND ACETAMINOPHEN 2 TABLET: 5; 325 TABLET ORAL at 04:03

## 2020-03-27 RX ADMIN — HYOSCYAMINE SULFATE 0.12 MG: 0.12 TABLET ORAL at 12:03

## 2020-03-27 RX ADMIN — DIPHENHYDRAMINE HYDROCHLORIDE 6.25 MG: 50 INJECTION, SOLUTION INTRAMUSCULAR; INTRAVENOUS at 04:03

## 2020-03-27 RX ADMIN — BRIMONIDINE TARTRATE 1 DROP: 1.5 SOLUTION OPHTHALMIC at 03:03

## 2020-03-27 RX ADMIN — DIPHENOXYLATE HYDROCHLORIDE AND ATROPINE SULFATE 1 TABLET: 2.5; .025 TABLET ORAL at 12:03

## 2020-03-27 RX ADMIN — CLOTRIMAZOLE 10 MG: 10 LOZENGE ORAL; TOPICAL at 04:03

## 2020-03-27 RX ADMIN — HYOSCYAMINE SULFATE 0.12 MG: 0.12 TABLET ORAL at 09:03

## 2020-03-27 RX ADMIN — DRONABINOL 2.5 MG: 2.5 CAPSULE ORAL at 05:03

## 2020-03-27 RX ADMIN — CLOTRIMAZOLE 10 MG: 10 LOZENGE ORAL; TOPICAL at 09:03

## 2020-03-27 RX ADMIN — FLUTICASONE PROPIONATE 100 MCG: 50 SPRAY, METERED NASAL at 09:03

## 2020-03-27 RX ADMIN — CLOTRIMAZOLE 10 MG: 10 LOZENGE ORAL; TOPICAL at 12:03

## 2020-03-27 RX ADMIN — PSYLLIUM HUSK 2 PACKET: 3.4 POWDER ORAL at 09:03

## 2020-03-27 RX ADMIN — CEFEPIME HYDROCHLORIDE 1 G: 1 INJECTION, SOLUTION INTRAVENOUS at 11:03

## 2020-03-27 RX ADMIN — CYCLOBENZAPRINE HYDROCHLORIDE 5 MG: 5 TABLET, FILM COATED ORAL at 12:03

## 2020-03-27 RX ADMIN — OXYBUTYNIN CHLORIDE 15 MG: 5 TABLET, EXTENDED RELEASE ORAL at 09:03

## 2020-03-27 RX ADMIN — PSYLLIUM HUSK 2 PACKET: 3.4 POWDER ORAL at 04:03

## 2020-03-27 RX ADMIN — DIPHENOXYLATE HYDROCHLORIDE AND ATROPINE SULFATE 1 TABLET: 2.5; .025 TABLET ORAL at 04:03

## 2020-03-27 RX ADMIN — DIPHENOXYLATE HYDROCHLORIDE AND ATROPINE SULFATE 1 TABLET: 2.5; .025 TABLET ORAL at 09:03

## 2020-03-27 RX ADMIN — PANTOPRAZOLE SODIUM 40 MG: 40 TABLET, DELAYED RELEASE ORAL at 05:03

## 2020-03-27 RX ADMIN — SERTRALINE HYDROCHLORIDE 50 MG: 50 TABLET ORAL at 09:03

## 2020-03-27 RX ADMIN — LEVOTHYROXINE SODIUM 88 MCG: 88 TABLET ORAL at 05:03

## 2020-03-27 RX ADMIN — DRONABINOL 2.5 MG: 2.5 CAPSULE ORAL at 04:03

## 2020-03-27 RX ADMIN — BRIMONIDINE TARTRATE 1 DROP: 1.5 SOLUTION OPHTHALMIC at 09:03

## 2020-03-27 RX ADMIN — MAGNESIUM OXIDE 400 MG: 400 TABLET ORAL at 09:03

## 2020-03-27 NOTE — PT/OT/SLP PROGRESS
"Occupational Therapy      Patient Name:  Shara Hutchins   MRN:  [id number]    Patient not seen today secondary to Patient unwilling to participate("No; I'm not getting up or doing anything; I'm leaving later today too" ).     Antonietta Ricadro OT  3/27/2020  "

## 2020-03-27 NOTE — PROGRESS NOTES
"[facility]  Adult Nutrition   Progress Note (Follow-Up)    SUMMARY     Recommendations/Interventions:    Recommendation/Intervention: 1. Continue current diet as tolerated,encourage intake. 2.  to assist in meal choices daily.  Goals: 1. Patient to meet at least 75% of estimated needs via PO intake of meals.  Nutrition Goal Status: progressing towards goal    Dietitian Rounds Brief:  · Seen 2' f/u. Appetite and intake continue to remain good. Per RN ostomy output of 500cc overnight. Urine has cleared and CXR improving per MD. CM working on discharge planning. Patient is eager to go home. Will continue to monitor intake, labs, and plan of care.    Reason for Assessment  Reason For Assessment: RD follow-up  Diagnosis: (Malnutrition/Malabsorption/ High ostomy output )  Relevant Medical History: CKD stage 3, T2DM, HTN, iron deficiency anemia, vulvar cancer  Interdisciplinary Rounds: attended    Nutrition Risk Screen  Nutrition Risk Screen: no indicators present       Ileostomy 02/13/20 1600 Loop RLQ-Wound Image: Images linked       Wound 02/23/20 2000 Moisture associated dermatitis medial Perineum #1-Wound Image: Images linked       Pressure Injury 02/27/20 1837 Left Buttocks Stage 2-Wound Image: Images linked  MST Score: 0  Have you recently lost weight without trying?: No(not since coming to hospital)  Weight loss score: 0  Have you been eating poorly because of a decreased appetite?: No  Appetite score: 0     Nutrition/Diet History  Spiritual, Cultural Beliefs, Nondenominational Practices, Values that Affect Care: no  Food Allergies: NKFA  Factors Affecting Nutritional Intake: malabsorption, altered gastrointestinal function  Nutrition Support Formula Prior to Admit: Other (see comments)(none)    Anthropometrics  Temp: 97.8 °F (36.6 °C)  Height Method: Stated  Height: 5' 7.5" (171.5 cm)  Height (inches): 67.5 in  Weight Method: Bed Scale  Weight: 56.6 kg (124 lb 12.5 oz)  Weight (lb): 124.78 lb  Ideal " Body Weight (IBW), Female: 137.5 lb  % Ideal Body Weight, Female (lb): 101.81 %  BMI (Calculated): 19.2  BMI Grade: 18.5-24.9 - normal     Weight History:  Wt Readings from Last 10 Encounters:   03/27/20 56.6 kg (124 lb 12.5 oz)   03/06/20 53 kg (116 lb 13.5 oz)   03/07/20 52.6 kg (116 lb)   03/03/20 53 kg (116 lb 13.5 oz)   02/19/20 58.7 kg (129 lb 4.8 oz)   02/19/20 58.8 kg (129 lb 9.6 oz)   02/14/20 56.7 kg (125 lb)   02/05/20 56.9 kg (125 lb 6.4 oz)   01/22/20 57.2 kg (126 lb)   01/08/20 61.4 kg (135 lb 6.4 oz)     Lab/Procedures/Meds: Pertinent Labs Reviewed  Clinical Chemistry:  Recent Labs   Lab 03/25/20  0505 03/26/20  0507 03/27/20  0420    135* 137   K 4.6 4.8 5.0    102 106   CO2 27 24 24   GLU 85 126* 97   BUN 33* 30* 29*   CREATININE 2.2* 2.3* 2.1*   CALCIUM 8.9 8.8 9.0   PROT 6.0 5.5* 5.7*   ALBUMIN 2.5* 2.3* 2.4*   BILITOT 0.8 0.8 0.8   ALKPHOS 323* 248* 264*   AST 35 25 22   ALT 22 19 18   ANIONGAP 10 9 7*   ESTGFRAFRICA 25.6* 24.3* 27.1*   EGFRNONAA 22.2* 21.1* 23.5*   MG 1.5* 1.4* 1.9   PHOS 4.8* 5.0* 4.4     CBC:   Recent Labs   Lab 03/27/20  0420   WBC 10.85   RBC 3.32*   HGB 10.1*   HCT 31.3*   *   MCV 94   MCH 30.4   MCHC 32.3     Lipid Panel:  Recent Labs   Lab 03/23/20  0449   TRIG 105     Cardiac Profile:  Recent Labs   Lab 03/20/20  1027   BNP 1,502*     Medications: Pertinent Medications reviewed  Scheduled Meds:   brimonidine 0.15 % OPTH DROP  1 drop Both Eyes TID    ceFEPime (MAXIPIME) IVPB  1 g Intravenous Q12H    clotrimazole  10 mg Oral QID    diphenoxylate-atropine 2.5-0.025 mg  1 tablet Oral QID    dronabinoL  2.5 mg Oral BID AC    epoetin nica-ebpx (RETACRIT) injection  100 Units/kg Subcutaneous Q7 Days    fluticasone propionate  2 spray Each Nostril Daily    levothyroxine  88 mcg Oral Before breakfast    magnesium oxide  400 mg Oral BID    magnesium sulfate IVPB  2 g Intravenous Once    oxybutynin  15 mg Oral Daily    pantoprazole  40 mg Oral BID     psyllium husk (with sugar)  2 packet Oral TID    sertraline  50 mg Oral Daily    sodium chloride  1 spray Each Nostril Daily     Continuous Infusions:   dextrose 10 % in water (D10W)       PRN Meds:.sodium chloride, sodium chloride, acetaminophen, albuterol **AND** [CANCELED] MDI TID WAKE, benzocaine, butalbital-acetaminophen-caffeine -40 mg, calcium chloride IVPB, calcium chloride IVPB, calcium chloride IVPB, cyclobenzaprine, dextrose 10 % in water (D10W), dextrose 50%, dextrose 50%, diphenhydrAMINE, glucagon (human recombinant), glucose, glucose, HYDROcodone-acetaminophen, HYDROmorphone, hyoscyamine, magnesium oxide, magnesium sulfate IVPB, magnesium sulfate IVPB, magnesium sulfate IVPB, magnesium sulfate IVPB, melatonin, ondansetron, potassium chloride in water, potassium chloride in water, potassium chloride in water, potassium chloride in water, potassium chloride, potassium chloride, potassium chloride, potassium chloride, sodium chloride 0.9%, sodium phosphate IVPB, sodium phosphate IVPB, sodium phosphate IVPB, sodium phosphate IVPB, sodium phosphate IVPB    Estimated/Assessed Needs    Weight Used For Calorie Calculations: 66 kg (145 lb 8.1 oz)  Energy Calorie Requirements (kcal): 8053-8704 kcals/day (30-35 kcals/kg)  Energy Need Method: Kcal/kg  Protein Requirements:  g/day (1.5-2.0 g/kg)  Weight Used For Protein Calculations: 66 kg (145 lb 8.1 oz)     Estimated Fluid Requirement Method: RDA Method    Nutrition Prescription Ordered    Current Diet Order: Regular Diet   Nutrition Order Comments: no concentrated sweets or juices    Evaluation of Received Nutrient/Fluid Intake    Energy Calories Required: not meeting needs  Protein Required: not meeting needs  Fluid Required: meeting needs  Tolerance: tolerating  % Intake of Estimated Energy Needs: 75 - 100 %  % Meal Intake: 75 - 100 %    Intake/Output Summary (Last 24 hours) at 3/27/2020 0998  Last data filed at 3/27/2020 0827  Gross per 24 hour    Intake 2111 ml   Output 3925 ml   Net -1814 ml      Nutrition Risk    Level of Risk/Frequency of Follow-up: moderate - high   Monitor and Evaluation    Food and Nutrient Intake: energy intake, food and beverage intake  Food and Nutrient Adminstration: diet order  Physical Activity and Function: nutrition-related ADLs and IADLs, factors affecting access to physical activity  Anthropometric Measurements: weight, weight change  Biochemical Data, Medical Tests and Procedures: electrolyte and renal panel, inflammatory profile, gastrointestinal profile, lipid profile, glucose/endocrine profile  Nutrition-Focused Physical Findings: overall appearance     Nutrition Follow-Up    RD Follow-up?: Yes  Kitty Blanco RD 03/27/2020 9:36 AM

## 2020-03-27 NOTE — PT/OT/SLP PROGRESS
"Physical Therapy      Patient Name:  Shara Hutchins   MRN:  [id number]    Patient not seen today secondary to Patient unwilling to participate(Pt declined PT stating "I'm getting this drip, then I'm taking a shower and then I'm out of here".). Will follow-up 03/28/20.    Chapis Clifton, PT    "

## 2020-03-27 NOTE — NURSING
Discharge instructions given to Fela in [facility]. Pt discharged via wheelchair with belongings. Pt denies pain/discomfort. IV monitoring removed.

## 2020-03-27 NOTE — PLAN OF CARE
03/27/20 1512   Final Note   Assessment Type Final Discharge Note   Anticipated Discharge Disposition LTAC   Post-Acute Status   Post-Acute Authorization Placement   Post-Acute Placement Status Set-up Complete     Pt is going to [facility] in [place] this evening. SW has provided pt's nurse with information to call report. Transportation will be arriving between 15:00-15:30.

## 2020-03-27 NOTE — PLAN OF CARE
03/26/20 2000   PRE-TX-O2   O2 Device (Oxygen Therapy) room air   SpO2 95 %   Pulse Oximetry Type Intermittent   $ Pulse Oximetry - Multiple Charge Pulse Oximetry - Multiple   Pulse 75   Resp 18   Inhaler   $ Inhaler Charges PRN treatment not required   Respiratory Evaluation   $ Care Plan Tech Time 15 min

## 2020-03-27 NOTE — PLAN OF CARE
03/27/20 0730   Patient Assessment/Suction   Level of Consciousness (AVPU) alert   Respiratory Effort Normal;Unlabored   Expansion/Accessory Muscles/Retractions expansion symmetric;no retractions;no use of accessory muscles   PRE-TX-O2   O2 Device (Oxygen Therapy) room air   SpO2 96 %   Pulse Oximetry Type Intermittent   $ Pulse Oximetry - Multiple Charge Pulse Oximetry - Multiple   Pulse 74   Resp 18   Inhaler   $ Inhaler Charges Refused   Respiratory Evaluation   $ Care Plan Tech Time 15 min

## 2020-03-27 NOTE — PLAN OF CARE
03/27/20 0804   Discharge Reassessment   Assessment Type Discharge Planning Reassessment   Anticipated Discharge Disposition LTAC  (Pt has been authorized by [facility] to go to [facility] in [place])   Post-Acute Status   Post-Acute Authorization Placement   Post-Acute Placement Status Awaiting Internal Medical Clearance     Pt is anticipated to d/c to [facility] today if cleared by Dr. Phan. LINWOOD sent a secure chat to Dr. Phan this morning to follow up. SW to send d/c orders when available. [facility] will arrange transportation.    9:03 LINWOOD provided pt with the above updates. Pt acknowledged understanding and is eager for d/c.    14:48 D/C orders have been faxed to [facility]. LINWOOD awaiting on [employer] to review and provide information for the nurse to call report.    15:09 LINWOOD received a call from [employer] instructing pt's nurse to call report at 15:45 to [facility] at [phone]. Transportation will arrive between 17:00-17:30.

## 2020-03-28 NOTE — DISCHARGE SUMMARY
Critical access hospital Medicine  Discharge Summary      Patient Name: Shara Hutchins  MRN: [id number]  Admission Date: 3/7/2020  Hospital Length of Stay: 18 days  Discharge Date and Time:  03/27/2020 9:51 PM  Attending Physician: No att. providers found   Discharging Provider: Aleksander Phan MD  Primary Care Provider: April Horton MD      HPI:   HPI: HTN, NIDDM-2, CKD-3, vulvar cancer (s/p chemo/radiation), and now s/p ileostomy after pneumatosis intestinalis earlier in 2020 and with persistent diarrhea 2/2 chemotherapy, who is transferred from [facility] for IR placement of CVC. Upon admission to [facility], she was having high output via her ileostomy, and subsequent DARCI and dehydration. She was placed on TPN, but there was difficulty getting IV access, and thus was receiving the TPN via a right femoral CVC, which was limiting therapy participation. As a tunneled line was unable to be obtained, she was transferred for IR services at [facility].          * No surgery found *      Hospital Course:   Patient was admitted for severe dehydration and malnutrition with 20 lb weight loss at home due to high output from the ileostomy.    Patient was started on TPN and IV fluids.  Patient continued to have high output from the ileostomy multiple anti-diarrheal agents./octreotide and multiple measures are instituted and improved ..    Patient was tolerating diet and had improved nutrition.  Octreotide was discontinued .  Surgery MD  Plan for reversal of ileostomy in several weeks or as needed.  .  Hospitalization was complicated with UTI and hematuria and bladder spasms and later needed Urology consultation and continuous bladder irrigation for hematuria but cleared later.  She was discharged in stable condition to long term care facility with U cath,  Urine culture was growing 2 organisms with Pseudomonas and Enterococcus faecalis and she needed IV antibiotic therapy to continued 7 days with  meropenem.  She was given appointment  with surgeon, urologist and Cardiology and primary care doctor as outpatient and patient was transferred in stable condition.     Consults:   Consults (From admission, onward)        Status Ordering Provider     Inpatient consult to Cardiology  Once     Provider:  Ron Shine MD    Completed BEE MUNGUIA     Inpatient consult to Nephrology  Once     Provider:  Yasmin Parker MD    Completed SILVIA VILLEGAS     Inpatient consult to Pulmonology  Once     Provider:  Moo Maharaj MD    Completed BEE MUNGUIA     Inpatient consult to Registered Dietitian/Nutritionist  Once     Provider:  (Not yet assigned)    Completed ROBIN MEHTA     Inpatient consult to Registered Dietitian/Nutritionist  Once     Provider:  (Not yet assigned)    Completed ROBIN MEHTA     Inpatient consult to Urology  Once     Provider:  Damari Barber Jr., MD    Completed SILVIA VILLEGAS     Inpatient consult to Urology  Once     Provider:  Gaby Garcia MD    Completed BERE SALAZAR.          No new Assessment & Plan notes have been filed under this hospital service since the last note was generated.  Service: Hospital Medicine    Final Active Diagnoses:    Diagnosis Date Noted POA    PRINCIPAL PROBLEM:  Acute hypoxemic respiratory failure [J96.01] 03/18/2020 No    Pulmonary hypertension [I27.20] 03/20/2020 Unknown    Malnutrition [E46] 03/07/2020 Yes    Debility [R53.81] 03/07/2020 Yes    Unstageable pressure injury of skin and tissue [L89.95] 03/06/2020 Yes    DNR (do not resuscitate) [Z66] 02/24/2020 Yes    S/P ileostomy [Z93.2] 02/13/2020 Not Applicable    Chronic diarrhea [K52.9] 02/10/2020 Yes     Chronic    Anemia of chronic disease [D63.8] 02/10/2020 Yes     Chronic    Stage 5 chronic kidney disease not on chronic dialysis [N18.5] 11/04/2018 Yes     Chronic    Iron deficiency anemia due to chronic blood loss [D50.0] 01/07/2018 Yes      Chronic    Inflammatory bowel disease [K52.9] 01/07/2018 Yes     Chronic      Problems Resolved During this Admission:    Diagnosis Date Noted Date Resolved POA    Thrombocytopenia, unspecified [D69.6] 03/07/2020 03/18/2020 Yes    Acute renal failure superimposed on stage 5 chronic kidney disease, not on chronic dialysis [N17.9, N18.5] 02/10/2020 03/18/2020 Yes       Discharged Condition: good    Disposition: Long Term Care    Follow Up:  Follow-up Information     April Horton MD In 2 weeks.    Specialty:  Internal Medicine  Contact information:  [address] [facility]  [place] [address]  [phone]             Robbi Lovell III, MD In 2 weeks.    Specialties:  General Surgery, Surgery  Contact information:  [address] [facility] [facility]  [address]  [place] [address]  [phone]             Ron Shine MD.    Specialty:  Cardiology  Contact information:  [address] [facility] [facility]  [address]  [facility]  [place] [address]  [phone]                 Patient Instructions:      Diet diabetic     Notify your health care provider if you experience any of the following:  severe uncontrolled pain     Activity as tolerated       Significant Diagnostic Studies: Labs:   CMP   Recent Labs   Lab 03/26/20  0507 03/27/20  0420   * 137   K 4.8 5.0    106   CO2 24 24   * 97   BUN 30* 29*   CREATININE 2.3* 2.1*   CALCIUM 8.8 9.0   PROT 5.5* 5.7*   ALBUMIN 2.3* 2.4*   BILITOT 0.8 0.8   ALKPHOS 248* 264*   AST 25 22   ALT 19 18   ANIONGAP 9 7*   ESTGFRAFRICA 24.3* 27.1*   EGFRNONAA 21.1* 23.5*    and CBC   Recent Labs   Lab 03/26/20  0507 03/27/20  0420   WBC 10.66 10.85   HGB 7.5* 10.1*   HCT 24.2* 31.3*   * 439*       Pending Diagnostic Studies:     None         Medications:  Reconciled Home Medications:      Medication List      START taking these medications    MEROPENEM 1 G/100 ML NS (READY TO MIX SYSTEM)  Inject 50 mLs (500 mg total) into the vein every 12 (twelve) hours. for 7 days        CHANGE how you take  these medications    glucose 4 GM chewable tablet  Take 4 tablets (16 g total) by mouth as needed.  What changed:  Another medication with the same name was removed. Continue taking this medication, and follow the directions you see here.        CONTINUE taking these medications    acetaminophen 325 MG tablet  Commonly known as:  TYLENOL  Take 2 tablets (650 mg total) by mouth every 4 (four) hours as needed.     cholestyramine-aspartame 4 gram Pwpk  Commonly known as:  QUESTRAN LIGHT  Take 1 packet (4 g total) by mouth 2 (two) times daily.     * dextrose 50%  injection  Inject 25 mLs (12.5 g total) into the vein as needed (between 51 - 70 mg/dL if patient cannnot take PO but has IV access.).     * dextrose 50%  injection  Inject 50 mLs (25 g total) into the vein as needed (less than 50 mg/dL if patient cannnot take PO but has IV access.).     lactated ringers infusion  Inject 125 mL/hr into the vein continuous.     levothyroxine 88 MCG tablet  Commonly known as:  SYNTHROID  Take 1 tablet (88 mcg total) by mouth before breakfast.     melatonin 3 mg tablet  Commonly known as:  MELATIN  Take 3 tablets (9 mg total) by mouth nightly as needed for Insomnia.     ondansetron 8 MG Tbdl  Commonly known as:  ZOFRAN-ODT  Take 1 tablet (8 mg total) by mouth every 8 (eight) hours as needed.     oxyCODONE-acetaminophen 5-325 mg per tablet  Commonly known as:  PERCOCET  Take 1 tablet by mouth every 4 (four) hours as needed.     pantoprazole 40 mg injection  Commonly known as:  PROTONIX  Inject 40 mg into the vein 2 (two) times daily.     psyllium husk (with sugar) 3.4 gram packet  Take 2 packets by mouth 3 (three) times daily.         * This list has 2 medication(s) that are the same as other medications prescribed for you. Read the directions carefully, and ask your doctor or other care provider to review them with you.            STOP taking these medications    diphenhydrAMINE 50 mg/mL injection  Commonly known as:  BENADRYL      HYDROcodone-acetaminophen 5-325 mg per tablet  Commonly known as:  NORCO     loperamide 2 mg capsule  Commonly known as:  IMODIUM        ASK your doctor about these medications    loperamide 2 mg capsule  Commonly known as:  IMODIUM  Take 2 capsules (4 mg total) by mouth 4 (four) times daily as needed for Diarrhea.  Ask about: Should I take this medication?            Indwelling Lines/Drains at time of discharge:   Lines/Drains/Airways     Peripherally Inserted Central Catheter Line            PICC Double Lumen 03/06/20 1154 other (see comments) 21 days          Drain                 Hemodialysis AV Fistula Left upper arm -- days         Ileostomy 02/13/20 1600 Loop RLQ 43 days         Urethral Catheter 03/16/20 2230 Straight-tip 18 Fr. 10 days              Physical Exam:  General- Patient alert and oriented x3 in NAD  HEENT- PERRLA, EOMI, OP clear, MMM  Neck- No JVD, Lymphadenopathy, Thyromegaly  CV- Regular rate and rhythm, No Murmur/jackie/rubs  Resp- Lungs CTA Bilaterally, No increased WOB  GI- Non tender/non-distended, BS normoactive x4 quads, no HSM  Skin-  No masses, rashes or lesions noted on cursory skin exam.  Time spent on the discharge of patient: 55 minutes  Patient was seen and examined on the date of discharge and determined to be suitable for discharge.         Aleksander Phan MD  Department of Hospital Medicine  [facility]

## 2020-03-31 ENCOUNTER — TELEPHONE (OUTPATIENT)
Dept: INFUSION THERAPY | Facility: HOSPITAL | Age: 70
End: 2020-03-31

## 2020-03-31 NOTE — TELEPHONE ENCOUNTER
Ra called to notify her that her hgb was 10.1/31.3 and she would not need her procrit tomorrow.  Patient states that she is currently in [facility].

## 2020-04-06 ENCOUNTER — TELEPHONE (OUTPATIENT)
Dept: UROLOGY | Facility: CLINIC | Age: 70
End: 2020-04-06

## 2020-04-08 NOTE — PT/OT/SLP DISCHARGE
Occupational Therapy Discharge Summary    Shara Hutchins  MRN: [id number]   Principal Problem: Acute hypoxemic respiratory failure      Patient Discharged from acute Occupational Therapy on 3/27/2020.  Please refer to prior OT note dated 3/23/2020 for functional status.    Assessment:      Patient appropriate for care in another setting.    Objective:     GOALS:   Multidisciplinary Problems     Occupational Therapy Goals     Not on file          Multidisciplinary Problems (Resolved)        Problem: Occupational Therapy Goal    Goal Priority Disciplines Outcome Interventions   Occupational Therapy Goal   (Resolved)     OT, PT/OT Met    Description:  Goals to be met by: discharge     Patient will increase functional independence with ADLs by performing:    UE Dressing with Set-up Assistance.  LE Dressing with Set-up Assistance.  Grooming while standing at sink with Supervision.  Upper extremity exercise program x20 reps per handout, with independence.                      Reasons for Discontinuation of Therapy Services  Transfer to alternate level of care.      Plan:     Patient Discharged to: Long Term Acute Care    Dashawn Sutherland, OT  4/8/2020

## 2020-05-05 ENCOUNTER — TELEPHONE (OUTPATIENT)
Dept: UROLOGY | Facility: CLINIC | Age: 70
End: 2020-05-05

## 2020-05-07 ENCOUNTER — TELEPHONE (OUTPATIENT)
Dept: UROLOGY | Facility: CLINIC | Age: 70
End: 2020-05-07

## 2020-05-07 ENCOUNTER — TELEPHONE (OUTPATIENT)
Dept: FAMILY MEDICINE | Facility: CLINIC | Age: 70
End: 2020-05-07

## 2020-05-07 NOTE — TELEPHONE ENCOUNTER
Called and spoke to patient regarding upcoming appointment. Informed patient that the provider would like to keep appointment just convert over to Virtual Visit. Explained in detail how Virtual Visit works with provider. Patient agreed to convert appointment over to Virtual Visit. Appointment switched over to Virtual Visit. Informed patient that a reminder will be sent via [employer] with detailed instructions. Patient verbalized understanding.

## 2020-05-07 NOTE — TELEPHONE ENCOUNTER
Reached out to patient to reschedule appointment previously cancelled due to Covid.  Call went to Melyssa  [facility] to call the office.

## 2020-05-07 NOTE — TELEPHONE ENCOUNTER
----- Message from Ariella Nelson sent at 5/7/2020 12:02 PM CDT -----  Contact: patient  Type:  Patient Returning Call    Who Called:  Patient   Who Left Message for Patient:  Karla   Does the patient know what this is regarding?:  Yes  Best Call Back Number:     Additional Information:  Per patient states she is still admitted at the hospital and will not be able to schedule anything until she is discharged, she will call when she is able to schedule

## 2020-05-12 ENCOUNTER — OFFICE VISIT (OUTPATIENT)
Dept: SURGERY | Facility: CLINIC | Age: 70
End: 2020-05-12
Payer: MEDICARE

## 2020-05-12 VITALS — HEIGHT: 68 IN | BODY MASS INDEX: 18.79 KG/M2 | WEIGHT: 124 LBS | TEMPERATURE: 99 F

## 2020-05-12 DIAGNOSIS — N18.5 STAGE 5 CHRONIC KIDNEY DISEASE NOT ON CHRONIC DIALYSIS: ICD-10-CM

## 2020-05-12 DIAGNOSIS — R19.8 HIGH OUTPUT ILEOSTOMY: Primary | ICD-10-CM

## 2020-05-12 DIAGNOSIS — D63.8 ANEMIA OF CHRONIC DISEASE: ICD-10-CM

## 2020-05-12 DIAGNOSIS — Z87.898 HISTORY OF DIFFICULT VENOUS ACCESS: ICD-10-CM

## 2020-05-12 DIAGNOSIS — E44.0 MODERATE PROTEIN-CALORIE MALNUTRITION: ICD-10-CM

## 2020-05-12 DIAGNOSIS — Z93.2 HIGH OUTPUT ILEOSTOMY: Primary | ICD-10-CM

## 2020-05-12 PROCEDURE — 99024 POSTOP FOLLOW-UP VISIT: CPT | Mod: S$GLB,,, | Performed by: SURGERY

## 2020-05-12 PROCEDURE — 99024 PR POST-OP FOLLOW-UP VISIT: ICD-10-PCS | Mod: S$GLB,,, | Performed by: SURGERY

## 2020-05-12 RX ORDER — CIPROFLOXACIN 2 MG/ML
400 INJECTION, SOLUTION INTRAVENOUS
Status: CANCELLED | OUTPATIENT
Start: 2020-05-18

## 2020-05-12 RX ORDER — METRONIDAZOLE 500 MG/100ML
500 INJECTION, SOLUTION INTRAVENOUS
Status: CANCELLED | OUTPATIENT
Start: 2020-05-18

## 2020-05-12 NOTE — PROGRESS NOTES
Subjective:       Patient ID: Shara Hutchins is a 70 y.o. female.    Chief Complaint: Other (Eval ostomy)      HPI:  Patient is 70-year-old female who is status post exploratory laparotomy with lysis of adhesions and loop ileostomy placement on February 13th.  Patient has had extremely high output from the ileostomy causing dehydration issues.  She has been at an LTAC receiving IV fluids to keep up with fluid demands.  She was originally on TPN after the surgery but has been weaned off.  She presents to the office today for ostomy reversal.  She reports no nausea vomiting, abdominal pains.  She has been afebrile. Her last albumin was 3.2 several days ago. She has chronic anemia and did receive a PRBC transfusion in late April. Last Hgb was 8.5 on the 11th.  She has had multiple abdominal surgeries.  She has past medical history of hypertension, diabetes, chronic kidney disease, vulvar cancer status post radiation.  She had chronic diarrhea before surgery.  She would have 10-20 bowel movements per day.     Past Medical History:   Diagnosis Date    Chronic kidney disease, stage III (moderate) 1/7/2018    Diabetes mellitus, type 2     Fatigue     Hypertension     Iron deficiency anemia due to chronic blood loss 1/7/2018    Vulvar cancer 1/7/2019     Past Surgical History:   Procedure Laterality Date    ABDOMINAL SURGERY      BACK SURGERY      carpel tunnel surgery once on each hand      CERVICAL FUSION      x 4    cervix repair      CHOLECYSTECTOMY  2000    DIAGNOSTIC LAPAROSCOPY N/A 2/13/2020    Procedure: LAPAROSCOPY, DIAGNOSTIC;  Surgeon: Robbi Lovell III, MD;  Location: [facility] OR;  Service: General;  Laterality: N/A;    HYSTERECTOMY  1980    ILEOSTOMY N/A 2/13/2020    Procedure: CREATION, ILEOSTOMY Loop;  Surgeon: Robbi Lovell III, MD;  Location: [facility] OR;  Service: General;  Laterality: N/A;    LUMBAR LAMINECTOMY      LYSIS OF ADHESIONS N/A 2/13/2020    Procedure: LYSIS, ADHESIONS;   Surgeon: Robbi Lovell III, MD;  Location: [facility] OR;  Service: General;  Laterality: N/A;    NECK SURGERY      PHLEBOGRAPHY  2/28/2020    Procedure: VENOGRAM;  Surgeon: Robbi Lovell III, MD;  Location: [facility] OR;  Service: General;;    REMOVAL OF VASCULAR ACCESS PORT Right 2/13/2020    Procedure: REMOVAL, VASCULAR ACCESS PORT;  Surgeon: Robbi Lovell III, MD;  Location: [facility] OR;  Service: General;  Laterality: Right;    SHOULDER SURGERY      vulva cancer       Review of patient's allergies indicates:   Allergen Reactions    Ciprofloxacin Other (See Comments)     Elevated liver enzymes      Pcn [penicillins] Anaphylaxis     Medication List with Changes/Refills   Current Medications    ACETAMINOPHEN (TYLENOL) 325 MG TABLET    Take 2 tablets (650 mg total) by mouth every 4 (four) hours as needed.    CHOLESTYRAMINE-ASPARTAME (QUESTRAN LIGHT) 4 GRAM PWPK    Take 1 packet (4 g total) by mouth 2 (two) times daily.    DEXTROSE 50% INJECTION    Inject 25 mLs (12.5 g total) into the vein as needed (between 51 - 70 mg/dL if patient cannnot take PO but has IV access.).    DEXTROSE 50% INJECTION    Inject 50 mLs (25 g total) into the vein as needed (less than 50 mg/dL if patient cannnot take PO but has IV access.).    GLUCOSE 4 GM CHEWABLE TABLET    Take 4 tablets (16 g total) by mouth as needed.    LEVOTHYROXINE (SYNTHROID) 88 MCG TABLET    Take 1 tablet (88 mcg total) by mouth before breakfast.    MELATONIN (MELATIN) 3 MG TABLET    Take 3 tablets (9 mg total) by mouth nightly as needed for Insomnia.    ONDANSETRON (ZOFRAN-ODT) 8 MG TBDL    Take 1 tablet (8 mg total) by mouth every 8 (eight) hours as needed.    OXYCODONE-ACETAMINOPHEN (PERCOCET) 5-325 MG PER TABLET    Take 1 tablet by mouth every 4 (four) hours as needed.    PANTOPRAZOLE (PROTONIX) 40 MG INJECTION    Inject 40 mg into the vein 2 (two) times daily.    PSYLLIUM HUSK, WITH SUGAR, 3.4 GRAM PACKET    Take 2 packets by mouth 3 (three) times daily.     RINGER'S SOLUTION,LACTATED (LACTATED RINGERS) INFUSION    Inject 125 mL/hr into the vein continuous.     Family History   Problem Relation Age of Onset    Heart disease Mother     Hypertension Mother     Cancer Mother         lung    Heart disease Father     Heart disease Sister         Open heart surgery    No Known Problems Daughter      Social History     Socioeconomic History    Marital status:      Spouse name: Not on file    Number of children: Not on file    Years of education: Not on file    Highest education level: Not on file   Occupational History    Not on file   Social Needs    Financial resource strain: Not on file    Food insecurity:     Worry: Not on file     Inability: Not on file    Transportation needs:     Medical: Not on file     Non-medical: Not on file   Tobacco Use    Smoking status: Former Smoker     Packs/day: 1.00     Years: 33.00     Pack years: 33.00     Types: Cigarettes     Last attempt to quit: 2000     Years since quittin.8    Smokeless tobacco: Never Used   Substance and Sexual Activity    Alcohol use: No    Drug use: No    Sexual activity: Not on file   Lifestyle    Physical activity:     Days per week: Not on file     Minutes per session: Not on file    Stress: Not on file   Relationships    Social connections:     Talks on phone: Not on file     Gets together: Not on file     Attends Restorationism service: Not on file     Active member of club or organization: Not on file     Attends meetings of clubs or organizations: Not on file     Relationship status: Not on file   Other Topics Concern    Not on file   Social History Narrative    Not on file         Review of Systems   Constitutional: Negative for appetite change, chills, fever and unexpected weight change.   HENT: Negative for hearing loss, rhinorrhea, sore throat and voice change.    Eyes: Negative for photophobia and visual disturbance.   Respiratory: Negative for cough, choking and  shortness of breath.    Cardiovascular: Negative for chest pain, palpitations and leg swelling.   Gastrointestinal: Negative for abdominal pain, blood in stool, constipation, diarrhea, nausea and vomiting.   Endocrine: Negative for cold intolerance, heat intolerance, polydipsia and polyuria.   Genitourinary: Positive for hematuria.   Musculoskeletal: Negative for arthralgias, back pain, joint swelling and neck stiffness.        In wheel chair    Skin: Negative for color change, pallor and rash.   Neurological: Positive for weakness. Negative for dizziness, seizures, syncope and headaches.   Hematological: Negative for adenopathy. Does not bruise/bleed easily.   Psychiatric/Behavioral: Negative for agitation, behavioral problems and confusion.       Objective:      Physical Exam   Constitutional: She is oriented to person, place, and time. She appears well-developed and well-nourished.  Non-toxic appearance. No distress.   HENT:   Head: Normocephalic and atraumatic. Head is without abrasion and without laceration.   Right Ear: External ear normal.   Left Ear: External ear normal.   Nose: Nose normal.   Mouth/Throat: Oropharynx is clear and moist.   Eyes: Pupils are equal, round, and reactive to light. EOM are normal.   Neck: Trachea normal and phonation normal. Neck supple. No tracheal deviation and normal range of motion present.   Cardiovascular: Normal rate and regular rhythm.   Pulmonary/Chest: Effort normal. No accessory muscle usage. No tachypnea. No respiratory distress.   Right chest wall jerome in place.    Abdominal: Soft. Bowel sounds are normal. She exhibits no distension. There is no tenderness. There is no rigidity, no rebound and no guarding.       Ileostomy is in position in the right lower quadrant.  It looks good.   Genitourinary:   Genitourinary Comments: Chronic indwelling lee with hematuria.    Lymphadenopathy:        Right: No inguinal adenopathy present.        Left: No inguinal adenopathy  present.   Neurological: She is alert and oriented to person, place, and time.   Skin: Skin is warm and intact.   Psychiatric: She has a normal mood and affect. Her speech is normal and behavior is normal.       Assessment/Plan:   High output ileostomy  -     Full code; Standing  -     Insert peripheral IV; Standing  -     Case Request Operating Room: CLOSURE, ILEOSTOMY  -     Comprehensive metabolic panel; Future; Expected date: 05/12/2020  -     CBC auto differential; Future; Expected date: 05/12/2020  -     EKG 12-lead; Future    Moderate protein-calorie malnutrition    Stage 5 chronic kidney disease not on chronic dialysis    History of difficult venous access    Anemia of chronic disease    Other orders  -     Progressive Mobility Protocol (mobilize patient to their highest level of functioning at least twice daily); Standing      Patient will be scheduled for ileostomy reversal next week.  The output is such that she needs supplemental IV fluids to stay hydrated.  She will most likely go back to having significant diarrhea daily.  Her last albumin was 3.2.  Her last hemoglobin was 8.5.  Will type and screen her on admission.  All risks and benefits of the procedure were discussed with the patient.    It is my medical opinion this surgery is time sensitive. Further delay of surgery could result in further detriment to the patient including:  Dehydration, persistence at facility requiring IV fluids and possibly TPN  Discussed the requirement for patient to comply with strict social distancing measures from the time of this evaluation through the day of surgery. Patient understands and agrees to comply with this requirement.        I discussed the proposed procedures the the patient including risks, benefits, indications, alternatives and special concerns.  The patient appears to understand and agrees to go ahead with surgery.  I have made no promises, warranties or verbal agreements beyond what was discussed  above.

## 2020-05-12 NOTE — H&P (VIEW-ONLY)
Subjective:       Patient ID: Shara Hutchins is a 70 y.o. female.    Chief Complaint: Other (Eval ostomy)      HPI:  Patient is 70-year-old female who is status post exploratory laparotomy with lysis of adhesions and loop ileostomy placement on February 13th.  Patient has had extremely high output from the ileostomy causing dehydration issues.  She has been at an LTAC receiving IV fluids to keep up with fluid demands.  She was originally on TPN after the surgery but has been weaned off.  She presents to the office today for ostomy reversal.  She reports no nausea vomiting, abdominal pains.  She has been afebrile. Her last albumin was 3.2 several days ago. She has chronic anemia and did receive a PRBC transfusion in late April. Last Hgb was 8.5 on the 11th.  She has had multiple abdominal surgeries.  She has past medical history of hypertension, diabetes, chronic kidney disease, vulvar cancer status post radiation.  She had chronic diarrhea before surgery.  She would have 10-20 bowel movements per day.     Past Medical History:   Diagnosis Date    Chronic kidney disease, stage III (moderate) 1/7/2018    Diabetes mellitus, type 2     Fatigue     Hypertension     Iron deficiency anemia due to chronic blood loss 1/7/2018    Vulvar cancer 1/7/2019     Past Surgical History:   Procedure Laterality Date    ABDOMINAL SURGERY      BACK SURGERY      carpel tunnel surgery once on each hand      CERVICAL FUSION      x 4    cervix repair      CHOLECYSTECTOMY  2000    DIAGNOSTIC LAPAROSCOPY N/A 2/13/2020    Procedure: LAPAROSCOPY, DIAGNOSTIC;  Surgeon: Robbi Lovell III, MD;  Location: [facility] OR;  Service: General;  Laterality: N/A;    HYSTERECTOMY  1980    ILEOSTOMY N/A 2/13/2020    Procedure: CREATION, ILEOSTOMY Loop;  Surgeon: Robbi Lovell III, MD;  Location: [facility] OR;  Service: General;  Laterality: N/A;    LUMBAR LAMINECTOMY      LYSIS OF ADHESIONS N/A 2/13/2020    Procedure: LYSIS, ADHESIONS;   Surgeon: Robbi Lovell III, MD;  Location: [facility] OR;  Service: General;  Laterality: N/A;    NECK SURGERY      PHLEBOGRAPHY  2/28/2020    Procedure: VENOGRAM;  Surgeon: Robbi Lovell III, MD;  Location: [facility] OR;  Service: General;;    REMOVAL OF VASCULAR ACCESS PORT Right 2/13/2020    Procedure: REMOVAL, VASCULAR ACCESS PORT;  Surgeon: Robbi Lovell III, MD;  Location: [facility] OR;  Service: General;  Laterality: Right;    SHOULDER SURGERY      vulva cancer       Review of patient's allergies indicates:   Allergen Reactions    Ciprofloxacin Other (See Comments)     Elevated liver enzymes      Pcn [penicillins] Anaphylaxis     Medication List with Changes/Refills   Current Medications    ACETAMINOPHEN (TYLENOL) 325 MG TABLET    Take 2 tablets (650 mg total) by mouth every 4 (four) hours as needed.    CHOLESTYRAMINE-ASPARTAME (QUESTRAN LIGHT) 4 GRAM PWPK    Take 1 packet (4 g total) by mouth 2 (two) times daily.    DEXTROSE 50% INJECTION    Inject 25 mLs (12.5 g total) into the vein as needed (between 51 - 70 mg/dL if patient cannnot take PO but has IV access.).    DEXTROSE 50% INJECTION    Inject 50 mLs (25 g total) into the vein as needed (less than 50 mg/dL if patient cannnot take PO but has IV access.).    GLUCOSE 4 GM CHEWABLE TABLET    Take 4 tablets (16 g total) by mouth as needed.    LEVOTHYROXINE (SYNTHROID) 88 MCG TABLET    Take 1 tablet (88 mcg total) by mouth before breakfast.    MELATONIN (MELATIN) 3 MG TABLET    Take 3 tablets (9 mg total) by mouth nightly as needed for Insomnia.    ONDANSETRON (ZOFRAN-ODT) 8 MG TBDL    Take 1 tablet (8 mg total) by mouth every 8 (eight) hours as needed.    OXYCODONE-ACETAMINOPHEN (PERCOCET) 5-325 MG PER TABLET    Take 1 tablet by mouth every 4 (four) hours as needed.    PANTOPRAZOLE (PROTONIX) 40 MG INJECTION    Inject 40 mg into the vein 2 (two) times daily.    PSYLLIUM HUSK, WITH SUGAR, 3.4 GRAM PACKET    Take 2 packets by mouth 3 (three) times daily.     RINGER'S SOLUTION,LACTATED (LACTATED RINGERS) INFUSION    Inject 125 mL/hr into the vein continuous.     Family History   Problem Relation Age of Onset    Heart disease Mother     Hypertension Mother     Cancer Mother         lung    Heart disease Father     Heart disease Sister         Open heart surgery    No Known Problems Daughter      Social History     Socioeconomic History    Marital status:      Spouse name: Not on file    Number of children: Not on file    Years of education: Not on file    Highest education level: Not on file   Occupational History    Not on file   Social Needs    Financial resource strain: Not on file    Food insecurity:     Worry: Not on file     Inability: Not on file    Transportation needs:     Medical: Not on file     Non-medical: Not on file   Tobacco Use    Smoking status: Former Smoker     Packs/day: 1.00     Years: 33.00     Pack years: 33.00     Types: Cigarettes     Last attempt to quit: 2000     Years since quittin.8    Smokeless tobacco: Never Used   Substance and Sexual Activity    Alcohol use: No    Drug use: No    Sexual activity: Not on file   Lifestyle    Physical activity:     Days per week: Not on file     Minutes per session: Not on file    Stress: Not on file   Relationships    Social connections:     Talks on phone: Not on file     Gets together: Not on file     Attends [employer]ist service: Not on file     Active member of club or organization: Not on file     Attends meetings of clubs or organizations: Not on file     Relationship status: Not on file   Other Topics Concern    Not on file   Social History Narrative    Not on file         Review of Systems   Constitutional: Negative for appetite change, chills, fever and unexpected weight change.   HENT: Negative for hearing loss, rhinorrhea, sore throat and voice change.    Eyes: Negative for photophobia and visual disturbance.   Respiratory: Negative for cough, choking and  shortness of breath.    Cardiovascular: Negative for chest pain, palpitations and leg swelling.   Gastrointestinal: Negative for abdominal pain, blood in stool, constipation, diarrhea, nausea and vomiting.   Endocrine: Negative for cold intolerance, heat intolerance, polydipsia and polyuria.   Genitourinary: Positive for hematuria.   Musculoskeletal: Negative for arthralgias, back pain, joint swelling and neck stiffness.        In wheel chair    Skin: Negative for color change, pallor and rash.   Neurological: Positive for weakness. Negative for dizziness, seizures, syncope and headaches.   Hematological: Negative for adenopathy. Does not bruise/bleed easily.   Psychiatric/Behavioral: Negative for agitation, behavioral problems and confusion.       Objective:      Physical Exam   Constitutional: She is oriented to person, place, and time. She appears well-developed and well-nourished.  Non-toxic appearance. No distress.   HENT:   Head: Normocephalic and atraumatic. Head is without abrasion and without laceration.   Right Ear: External ear normal.   Left Ear: External ear normal.   Nose: Nose normal.   Mouth/Throat: Oropharynx is clear and moist.   Eyes: Pupils are equal, round, and reactive to light. EOM are normal.   Neck: Trachea normal and phonation normal. Neck supple. No tracheal deviation and normal range of motion present.   Cardiovascular: Normal rate and regular rhythm.   Pulmonary/Chest: Effort normal. No accessory muscle usage. No tachypnea. No respiratory distress.   Right chest wall jerome in place.    Abdominal: Soft. Bowel sounds are normal. She exhibits no distension. There is no tenderness. There is no rigidity, no rebound and no guarding.       Ileostomy is in position in the right lower quadrant.  It looks good.   Genitourinary:   Genitourinary Comments: Chronic indwelling lee with hematuria.    Lymphadenopathy:        Right: No inguinal adenopathy present.        Left: No inguinal adenopathy  present.   Neurological: She is alert and oriented to person, place, and time.   Skin: Skin is warm and intact.   Psychiatric: She has a normal mood and affect. Her speech is normal and behavior is normal.       Assessment/Plan:   High output ileostomy  -     Full code; Standing  -     Insert peripheral IV; Standing  -     Case Request Operating Room: CLOSURE, ILEOSTOMY  -     Comprehensive metabolic panel; Future; Expected date: 05/12/2020  -     CBC auto differential; Future; Expected date: 05/12/2020  -     EKG 12-lead; Future    Moderate protein-calorie malnutrition    Stage 5 chronic kidney disease not on chronic dialysis    History of difficult venous access    Anemia of chronic disease    Other orders  -     Progressive Mobility Protocol (mobilize patient to their highest level of functioning at least twice daily); Standing      Patient will be scheduled for ileostomy reversal next week.  The output is such that she needs supplemental IV fluids to stay hydrated.  She will most likely go back to having significant diarrhea daily.  Her last albumin was 3.2.  Her last hemoglobin was 8.5.  Will type and screen her on admission.  All risks and benefits of the procedure were discussed with the patient.    It is my medical opinion this surgery is time sensitive. Further delay of surgery could result in further detriment to the patient including:  Dehydration, persistence at facility requiring IV fluids and possibly TPN  Discussed the requirement for patient to comply with strict social distancing measures from the time of this evaluation through the day of surgery. Patient understands and agrees to comply with this requirement.        I discussed the proposed procedures the the patient including risks, benefits, indications, alternatives and special concerns.  The patient appears to understand and agrees to go ahead with surgery.  I have made no promises, warranties or verbal agreements beyond what was discussed  above.

## 2020-05-13 ENCOUNTER — TELEPHONE (OUTPATIENT)
Dept: UROLOGY | Facility: CLINIC | Age: 70
End: 2020-05-13

## 2020-05-15 ENCOUNTER — HOSPITAL ENCOUNTER (OUTPATIENT)
Dept: RADIOLOGY | Facility: HOSPITAL | Age: 70
Discharge: HOME | DRG: 330 | End: 2020-05-15
Attending: SURGERY
Payer: MEDICARE

## 2020-05-15 ENCOUNTER — HOSPITAL ENCOUNTER (OUTPATIENT)
Dept: PREADMISSION TESTING | Facility: HOSPITAL | Age: 70
Discharge: HOME | DRG: 330 | End: 2020-05-15
Attending: INTERNAL MEDICINE
Payer: MEDICARE

## 2020-05-15 VITALS
HEART RATE: 74 BPM | DIASTOLIC BLOOD PRESSURE: 77 MMHG | OXYGEN SATURATION: 98 % | SYSTOLIC BLOOD PRESSURE: 156 MMHG | TEMPERATURE: 98 F | BODY MASS INDEX: 18.77 KG/M2 | WEIGHT: 123.88 LBS | HEIGHT: 68 IN | RESPIRATION RATE: 18 BRPM

## 2020-05-15 DIAGNOSIS — R19.8 HIGH OUTPUT ILEOSTOMY: ICD-10-CM

## 2020-05-15 DIAGNOSIS — Z01.818 PRE-OP TESTING: Primary | ICD-10-CM

## 2020-05-15 DIAGNOSIS — Z01.818 PRE-OP TESTING: ICD-10-CM

## 2020-05-15 DIAGNOSIS — Z93.2 HIGH OUTPUT ILEOSTOMY: ICD-10-CM

## 2020-05-15 PROCEDURE — U0003 INFECTIOUS AGENT DETECTION BY NUCLEIC ACID (DNA OR RNA); SEVERE ACUTE RESPIRATORY SYNDROME CORONAVIRUS 2 (SARS-COV-2) (CORONAVIRUS DISEASE [COVID-19]), AMPLIFIED PROBE TECHNIQUE, MAKING USE OF HIGH THROUGHPUT TECHNOLOGIES AS DESCRIBED BY CMS-2020-01-R: HCPCS

## 2020-05-15 PROCEDURE — 71046 X-RAY EXAM CHEST 2 VIEWS: CPT | Mod: TC

## 2020-05-15 RX ORDER — LORAZEPAM 2 MG/1
0.5 TABLET ORAL EVERY 6 HOURS PRN
Status: ON HOLD | COMMUNITY
End: 2020-05-22 | Stop reason: HOSPADM

## 2020-05-15 RX ORDER — LANOLIN ALCOHOL/MO/W.PET/CERES
400 CREAM (GRAM) TOPICAL 3 TIMES DAILY
Status: ON HOLD | COMMUNITY
End: 2020-05-22 | Stop reason: HOSPADM

## 2020-05-15 RX ORDER — DRONABINOL 2.5 MG/1
2.5 CAPSULE ORAL 2 TIMES DAILY
Status: ON HOLD | COMMUNITY
End: 2020-05-22 | Stop reason: HOSPADM

## 2020-05-15 RX ORDER — TEMAZEPAM 30 MG/1
30 CAPSULE ORAL NIGHTLY PRN
Status: ON HOLD | COMMUNITY
End: 2020-05-22 | Stop reason: HOSPADM

## 2020-05-15 NOTE — PLAN OF CARE
Review of Systems   Gastrointestinal:        Right abdominal ostomy bag   Genitourinary:        Godwin catheter draining red urine, cloudy   Skin:        Bruises noted on arms - states itches frequently and scratches self       In wheelchair, states can stand but can not walk

## 2020-05-15 NOTE — DISCHARGE INSTRUCTIONS
To confirm, Your doctor has instructed you that surgery is scheduled for:  Monday may 18, 2020    Pre-Op will call the afternoon prior to surgery between 4:00 and 6:00 PM with the final arrival time.      Please report to [facility] through front entrance the morning of surgery.     Do not eat or drink anything after midnight the night before your surgery - THIS INCLUDES  WATER, GUM, MINTS AND CANDY.  YOU MAY BRUSH YOUR TEETH BUT DO NOT SWALLOW     TAKE ONLY THESE MEDICATIONS WITH A SMALL SIP OF WATER THE MORNING OF YOUR PROCEDURE:  Levothyroxine  And Benadryl ( as needed)       PLEASE NOTE:  The surgery schedule has many variables which may affect the time of your surgery case.  Family members should be available if your surgery time changes.  Plan to be here the day of your procedure between 4-6 hours.    DO NOT TAKE THESE MEDICATIONS 5-7 DAYS PRIOR to your procedure or per your surgeon's request: ASPIRIN, ALEVE, ADVIL, IBUPROFEN,  SATINDER SELTZER, BC , FISH OIL , VITAMIN E, HERBALS  (May take Tylenol)         IMPORTANT INSTRUCTIONS      · Do not smoke, vape or drink alcoholic beverages 24 hours prior to your procedure.  · Shower the night before AND the morning of your procedure with a Chlorhexidine wash such as Hibiclens or Dial antibacterial soap from the neck down.   ·  Do not get it on your face or in your eyes.  You may use your own shampoo and face wash. This helps your skin to be as bacteria free as possible.   ·  DO NOT remove hair from the surgery site.  Do not shave the incision site unless you are given specific instructions to do so.    · Sleep in a bed with clean sheets.  Do not sleep with a pet in the bed.   · If you wear contact lenses, dentures, hearing aids or glasses, bring a container to put them in during surgery and give to a family member for safe keeping.    · Please leave all jewelry, piercing's and valuables at home.     · Make arrangements in advance for transportation home by a  responsible adult.      · You must make arrangements for transportation, TAXI'S, UBER'S OR LYFTS ARE NOT ALLOWED.        If you have any questions about these instructions, call (Monday - Friday) Pre-Op Admit  Nursing  at [phone] or the Pre-Op Day Surgery Unit at [phone].

## 2020-05-15 NOTE — PRE-PROCEDURE INSTRUCTIONS
Called [facility] [phone] and [phone] unable to speak with anyone. Left message for nurse to call.  Attempted to verify meds and arrange transportation for 5/18/20 surgery .

## 2020-05-15 NOTE — CARE UPDATE
Spoke to [facility] nurse  (Rai) by phone instructions given for npo, will give levothyroxine and Benadryl am of surgery.instructed not to remove blood band, and nurse will call later today with arrival time . Also verified meds with nurse

## 2020-05-16 LAB — SARS-COV-2 RNA RESP QL NAA+PROBE: NOT DETECTED

## 2020-05-18 ENCOUNTER — ANESTHESIA (OUTPATIENT)
Dept: SURGERY | Facility: HOSPITAL | Age: 70
DRG: 330 | End: 2020-05-18
Payer: MEDICARE

## 2020-05-18 ENCOUNTER — HOSPITAL ENCOUNTER (INPATIENT)
Facility: HOSPITAL | Age: 70
LOS: 5 days | Discharge: HOME HEALTH | DRG: 330 | End: 2020-05-23
Attending: SURGERY | Admitting: INTERNAL MEDICINE
Payer: MEDICARE

## 2020-05-18 ENCOUNTER — ANESTHESIA EVENT (OUTPATIENT)
Dept: SURGERY | Facility: HOSPITAL | Age: 70
DRG: 330 | End: 2020-05-18
Payer: MEDICARE

## 2020-05-18 DIAGNOSIS — R07.9 CHEST PAIN: ICD-10-CM

## 2020-05-18 DIAGNOSIS — E87.6 HYPOKALEMIA: ICD-10-CM

## 2020-05-18 DIAGNOSIS — D63.8 ANEMIA OF CHRONIC DISEASE: Chronic | ICD-10-CM

## 2020-05-18 DIAGNOSIS — D50.0 IRON DEFICIENCY ANEMIA DUE TO CHRONIC BLOOD LOSS: Chronic | ICD-10-CM

## 2020-05-18 DIAGNOSIS — E43 SEVERE MALNUTRITION: ICD-10-CM

## 2020-05-18 DIAGNOSIS — Z98.890 STATUS POST REVERSAL OF ILEOSTOMY: Primary | ICD-10-CM

## 2020-05-18 DIAGNOSIS — E44.1: ICD-10-CM

## 2020-05-18 DIAGNOSIS — Z93.2 S/P ILEOSTOMY: ICD-10-CM

## 2020-05-18 DIAGNOSIS — R53.81 DEBILITY: ICD-10-CM

## 2020-05-18 DIAGNOSIS — K52.9 CHRONIC DIARRHEA: Chronic | ICD-10-CM

## 2020-05-18 LAB
BLD PROD TYP BPU: NORMAL
BLD PROD TYP BPU: NORMAL
BLOOD UNIT EXPIRATION DATE: NORMAL
BLOOD UNIT EXPIRATION DATE: NORMAL
BLOOD UNIT TYPE CODE: 6200
BLOOD UNIT TYPE CODE: 6200
BLOOD UNIT TYPE: NORMAL
BLOOD UNIT TYPE: NORMAL
CODING SYSTEM: NORMAL
CODING SYSTEM: NORMAL
DISPENSE STATUS: NORMAL
DISPENSE STATUS: NORMAL
GLUCOSE SERPL-MCNC: 91 MG/DL (ref 70–110)
MRSA SCREEN BY PCR: POSITIVE
NUM UNITS TRANS PACKED RBC: NORMAL
NUM UNITS TRANS PACKED RBC: NORMAL

## 2020-05-18 PROCEDURE — 88304 TISSUE EXAM BY PATHOLOGIST: CPT | Mod: TC

## 2020-05-18 PROCEDURE — 37000008 HC ANESTHESIA 1ST 15 MINUTES: Performed by: SURGERY

## 2020-05-18 PROCEDURE — 25000003 PHARM REV CODE 250: Performed by: SURGERY

## 2020-05-18 PROCEDURE — 44625 REPAIR BOWEL OPENING: CPT | Mod: ,,, | Performed by: SURGERY

## 2020-05-18 PROCEDURE — 27201107 HC STYLET, STANDARD: Performed by: ANESTHESIOLOGY

## 2020-05-18 PROCEDURE — S0073 INJECTION, AZTREONAM, 500 MG: HCPCS | Performed by: SURGERY

## 2020-05-18 PROCEDURE — 63600175 PHARM REV CODE 636 W HCPCS: Performed by: ANESTHESIOLOGY

## 2020-05-18 PROCEDURE — 63600175 PHARM REV CODE 636 W HCPCS: Performed by: SURGERY

## 2020-05-18 PROCEDURE — 94761 N-INVAS EAR/PLS OXIMETRY MLT: CPT

## 2020-05-18 PROCEDURE — 99900035 HC TECH TIME PER 15 MIN (STAT)

## 2020-05-18 PROCEDURE — 36000707: Performed by: SURGERY

## 2020-05-18 PROCEDURE — S0073 INJECTION, AZTREONAM, 500 MG: HCPCS | Performed by: INTERNAL MEDICINE

## 2020-05-18 PROCEDURE — 27100025 HC TUBING, SET FLUID WARMER: Performed by: ANESTHESIOLOGY

## 2020-05-18 PROCEDURE — 27000671 HC TUBING MICROBORE EXT: Performed by: ANESTHESIOLOGY

## 2020-05-18 PROCEDURE — C9113 INJ PANTOPRAZOLE SODIUM, VIA: HCPCS | Performed by: INTERNAL MEDICINE

## 2020-05-18 PROCEDURE — 86920 COMPATIBILITY TEST SPIN: CPT

## 2020-05-18 PROCEDURE — 27201423 OPTIME MED/SURG SUP & DEVICES STERILE SUPPLY: Performed by: SURGERY

## 2020-05-18 PROCEDURE — 27000673 HC TUBING BLOOD Y: Performed by: ANESTHESIOLOGY

## 2020-05-18 PROCEDURE — 71000033 HC RECOVERY, INTIAL HOUR: Performed by: SURGERY

## 2020-05-18 PROCEDURE — 63600175 PHARM REV CODE 636 W HCPCS: Performed by: NURSE ANESTHETIST, CERTIFIED REGISTERED

## 2020-05-18 PROCEDURE — 25000003 PHARM REV CODE 250: Performed by: INTERNAL MEDICINE

## 2020-05-18 PROCEDURE — 87641 MR-STAPH DNA AMP PROBE: CPT

## 2020-05-18 PROCEDURE — 71000039 HC RECOVERY, EACH ADD'L HOUR: Performed by: SURGERY

## 2020-05-18 PROCEDURE — 25000003 PHARM REV CODE 250: Performed by: NURSE ANESTHETIST, CERTIFIED REGISTERED

## 2020-05-18 PROCEDURE — 63600175 PHARM REV CODE 636 W HCPCS: Performed by: INTERNAL MEDICINE

## 2020-05-18 PROCEDURE — C9290 INJ, BUPIVACAINE LIPOSOME: HCPCS | Performed by: SURGERY

## 2020-05-18 PROCEDURE — 27000221 HC OXYGEN, UP TO 24 HOURS

## 2020-05-18 PROCEDURE — S0028 INJECTION, FAMOTIDINE, 20 MG: HCPCS | Performed by: NURSE ANESTHETIST, CERTIFIED REGISTERED

## 2020-05-18 PROCEDURE — 27202105 HC BIS BILATERAL SENSOR: Performed by: ANESTHESIOLOGY

## 2020-05-18 PROCEDURE — 36000706: Performed by: SURGERY

## 2020-05-18 PROCEDURE — 12000002 HC ACUTE/MED SURGE SEMI-PRIVATE ROOM

## 2020-05-18 PROCEDURE — 44625 PR CLOSE ENTEROSTOMY,RESEC+ANAST: ICD-10-PCS | Mod: ,,, | Performed by: SURGERY

## 2020-05-18 PROCEDURE — 37000009 HC ANESTHESIA EA ADD 15 MINS: Performed by: SURGERY

## 2020-05-18 RX ORDER — HYDROMORPHONE HYDROCHLORIDE 1 MG/ML
0.2 INJECTION, SOLUTION INTRAMUSCULAR; INTRAVENOUS; SUBCUTANEOUS EVERY 5 MIN PRN
Status: DISCONTINUED | OUTPATIENT
Start: 2020-05-18 | End: 2020-05-18 | Stop reason: HOSPADM

## 2020-05-18 RX ORDER — HYDROMORPHONE HYDROCHLORIDE 1 MG/ML
0.5 INJECTION, SOLUTION INTRAMUSCULAR; INTRAVENOUS; SUBCUTANEOUS EVERY 6 HOURS PRN
Status: DISCONTINUED | OUTPATIENT
Start: 2020-05-18 | End: 2020-05-19

## 2020-05-18 RX ORDER — KETAMINE HYDROCHLORIDE 50 MG/ML
INJECTION, SOLUTION INTRAMUSCULAR; INTRAVENOUS
Status: DISCONTINUED | OUTPATIENT
Start: 2020-05-18 | End: 2020-05-18

## 2020-05-18 RX ORDER — LORAZEPAM 2 MG/ML
0.5 INJECTION INTRAMUSCULAR EVERY 4 HOURS PRN
Status: DISCONTINUED | OUTPATIENT
Start: 2020-05-18 | End: 2020-05-19

## 2020-05-18 RX ORDER — NEOSTIGMINE METHYLSULFATE 1 MG/ML
INJECTION, SOLUTION INTRAVENOUS
Status: DISCONTINUED | OUTPATIENT
Start: 2020-05-18 | End: 2020-05-18

## 2020-05-18 RX ORDER — PANTOPRAZOLE SODIUM 40 MG/10ML
40 INJECTION, POWDER, LYOPHILIZED, FOR SOLUTION INTRAVENOUS 2 TIMES DAILY
Status: DISCONTINUED | OUTPATIENT
Start: 2020-05-18 | End: 2020-05-19

## 2020-05-18 RX ORDER — ONDANSETRON 2 MG/ML
INJECTION INTRAMUSCULAR; INTRAVENOUS
Status: DISCONTINUED | OUTPATIENT
Start: 2020-05-18 | End: 2020-05-18

## 2020-05-18 RX ORDER — IBUPROFEN 200 MG
16 TABLET ORAL
Status: DISCONTINUED | OUTPATIENT
Start: 2020-05-18 | End: 2020-05-22

## 2020-05-18 RX ORDER — ROCURONIUM BROMIDE 10 MG/ML
INJECTION, SOLUTION INTRAVENOUS
Status: DISCONTINUED | OUTPATIENT
Start: 2020-05-18 | End: 2020-05-18

## 2020-05-18 RX ORDER — CLINDAMYCIN PHOSPHATE 900 MG/50ML
INJECTION, SOLUTION INTRAVENOUS
Status: DISCONTINUED | OUTPATIENT
Start: 2020-05-18 | End: 2020-05-18

## 2020-05-18 RX ORDER — GLUCAGON 1 MG
1 KIT INJECTION
Status: DISCONTINUED | OUTPATIENT
Start: 2020-05-18 | End: 2020-05-22

## 2020-05-18 RX ORDER — IBUPROFEN 200 MG
24 TABLET ORAL
Status: DISCONTINUED | OUTPATIENT
Start: 2020-05-18 | End: 2020-05-22

## 2020-05-18 RX ORDER — PROPOFOL 10 MG/ML
VIAL (ML) INTRAVENOUS
Status: DISCONTINUED | OUTPATIENT
Start: 2020-05-18 | End: 2020-05-18

## 2020-05-18 RX ORDER — MEPERIDINE HYDROCHLORIDE 50 MG/ML
12.5 INJECTION INTRAMUSCULAR; INTRAVENOUS; SUBCUTANEOUS EVERY 10 MIN PRN
Status: DISCONTINUED | OUTPATIENT
Start: 2020-05-18 | End: 2020-05-18 | Stop reason: HOSPADM

## 2020-05-18 RX ORDER — FAMOTIDINE 10 MG/ML
INJECTION INTRAVENOUS
Status: DISCONTINUED | OUTPATIENT
Start: 2020-05-18 | End: 2020-05-18

## 2020-05-18 RX ORDER — FENTANYL CITRATE 50 UG/ML
INJECTION, SOLUTION INTRAMUSCULAR; INTRAVENOUS
Status: DISCONTINUED | OUTPATIENT
Start: 2020-05-18 | End: 2020-05-18

## 2020-05-18 RX ORDER — ONDANSETRON 2 MG/ML
4 INJECTION INTRAMUSCULAR; INTRAVENOUS EVERY 6 HOURS PRN
Status: DISCONTINUED | OUTPATIENT
Start: 2020-05-18 | End: 2020-05-23 | Stop reason: HOSPADM

## 2020-05-18 RX ORDER — SODIUM CHLORIDE 0.9 % (FLUSH) 0.9 %
10 SYRINGE (ML) INJECTION
Status: DISCONTINUED | OUTPATIENT
Start: 2020-05-18 | End: 2020-05-23 | Stop reason: HOSPADM

## 2020-05-18 RX ORDER — INSULIN ASPART 100 [IU]/ML
0-5 INJECTION, SOLUTION INTRAVENOUS; SUBCUTANEOUS
Status: DISCONTINUED | OUTPATIENT
Start: 2020-05-18 | End: 2020-05-22

## 2020-05-18 RX ORDER — CLINDAMYCIN PHOSPHATE 900 MG/50ML
900 INJECTION, SOLUTION INTRAVENOUS
Status: COMPLETED | OUTPATIENT
Start: 2020-05-18 | End: 2020-05-19

## 2020-05-18 RX ORDER — SODIUM CHLORIDE, SODIUM LACTATE, POTASSIUM CHLORIDE, CALCIUM CHLORIDE 600; 310; 30; 20 MG/100ML; MG/100ML; MG/100ML; MG/100ML
INJECTION, SOLUTION INTRAVENOUS CONTINUOUS PRN
Status: DISCONTINUED | OUTPATIENT
Start: 2020-05-18 | End: 2020-05-18

## 2020-05-18 RX ORDER — LEVOTHYROXINE SODIUM ANHYDROUS 100 UG/5ML
50 INJECTION, POWDER, LYOPHILIZED, FOR SOLUTION INTRAVENOUS
Status: DISCONTINUED | OUTPATIENT
Start: 2020-05-18 | End: 2020-05-19

## 2020-05-18 RX ORDER — ACETAMINOPHEN 10 MG/ML
INJECTION, SOLUTION INTRAVENOUS
Status: DISCONTINUED | OUTPATIENT
Start: 2020-05-18 | End: 2020-05-18

## 2020-05-18 RX ORDER — GLYCOPYRROLATE 0.2 MG/ML
INJECTION INTRAMUSCULAR; INTRAVENOUS
Status: DISCONTINUED | OUTPATIENT
Start: 2020-05-18 | End: 2020-05-18

## 2020-05-18 RX ORDER — MIDAZOLAM HYDROCHLORIDE 5 MG/ML
INJECTION INTRAMUSCULAR; INTRAVENOUS
Status: DISCONTINUED | OUTPATIENT
Start: 2020-05-18 | End: 2020-05-18

## 2020-05-18 RX ORDER — BUPIVACAINE HYDROCHLORIDE AND EPINEPHRINE 5; 5 MG/ML; UG/ML
INJECTION, SOLUTION EPIDURAL; INTRACAUDAL; PERINEURAL
Status: DISCONTINUED | OUTPATIENT
Start: 2020-05-18 | End: 2020-05-18 | Stop reason: HOSPADM

## 2020-05-18 RX ORDER — ONDANSETRON 2 MG/ML
4 INJECTION INTRAMUSCULAR; INTRAVENOUS DAILY PRN
Status: DISCONTINUED | OUTPATIENT
Start: 2020-05-18 | End: 2020-05-18 | Stop reason: HOSPADM

## 2020-05-18 RX ORDER — SODIUM CHLORIDE 9 MG/ML
INJECTION, SOLUTION INTRAVENOUS CONTINUOUS PRN
Status: DISCONTINUED | OUTPATIENT
Start: 2020-05-18 | End: 2020-05-18

## 2020-05-18 RX ORDER — SODIUM CHLORIDE, SODIUM LACTATE, POTASSIUM CHLORIDE, CALCIUM CHLORIDE 600; 310; 30; 20 MG/100ML; MG/100ML; MG/100ML; MG/100ML
INJECTION, SOLUTION INTRAVENOUS CONTINUOUS
Status: DISCONTINUED | OUTPATIENT
Start: 2020-05-18 | End: 2020-05-21

## 2020-05-18 RX ADMIN — HYDROMORPHONE HYDROCHLORIDE 0.2 MG: 1 INJECTION, SOLUTION INTRAMUSCULAR; INTRAVENOUS; SUBCUTANEOUS at 10:05

## 2020-05-18 RX ADMIN — HYDROMORPHONE HYDROCHLORIDE 0.2 MG: 1 INJECTION, SOLUTION INTRAMUSCULAR; INTRAVENOUS; SUBCUTANEOUS at 12:05

## 2020-05-18 RX ADMIN — CLINDAMYCIN IN 5 PERCENT DEXTROSE 900 MG: 18 INJECTION, SOLUTION INTRAVENOUS at 08:05

## 2020-05-18 RX ADMIN — SODIUM CHLORIDE: 9 INJECTION, SOLUTION INTRAVENOUS at 09:05

## 2020-05-18 RX ADMIN — SODIUM CHLORIDE, SODIUM LACTATE, POTASSIUM CHLORIDE, AND CALCIUM CHLORIDE: .6; .31; .03; .02 INJECTION, SOLUTION INTRAVENOUS at 09:05

## 2020-05-18 RX ADMIN — HYDROMORPHONE HYDROCHLORIDE 0.5 MG: 1 INJECTION, SOLUTION INTRAMUSCULAR; INTRAVENOUS; SUBCUTANEOUS at 06:05

## 2020-05-18 RX ADMIN — SODIUM CHLORIDE, SODIUM LACTATE, POTASSIUM CHLORIDE, AND CALCIUM CHLORIDE 100 ML/HR: .6; .31; .03; .02 INJECTION, SOLUTION INTRAVENOUS at 06:05

## 2020-05-18 RX ADMIN — AZTREONAM 1 G: 1 INJECTION, POWDER, LYOPHILIZED, FOR SOLUTION INTRAMUSCULAR; INTRAVENOUS at 08:05

## 2020-05-18 RX ADMIN — KETAMINE HYDROCHLORIDE 25 MG: 50 INJECTION INTRAMUSCULAR; INTRAVENOUS at 08:05

## 2020-05-18 RX ADMIN — FAMOTIDINE 20 MG: 10 INJECTION, SOLUTION INTRAVENOUS at 08:05

## 2020-05-18 RX ADMIN — MIDAZOLAM HYDROCHLORIDE 1 MG: 5 INJECTION, SOLUTION INTRAMUSCULAR; INTRAVENOUS at 08:05

## 2020-05-18 RX ADMIN — PANTOPRAZOLE SODIUM 40 MG: 40 INJECTION, POWDER, FOR SOLUTION INTRAVENOUS at 09:05

## 2020-05-18 RX ADMIN — GLYCOPYRROLATE 0.4 MG: 0.2 INJECTION INTRAMUSCULAR; INTRAVENOUS at 09:05

## 2020-05-18 RX ADMIN — SODIUM CHLORIDE, SODIUM LACTATE, POTASSIUM CHLORIDE, AND CALCIUM CHLORIDE: .6; .31; .03; .02 INJECTION, SOLUTION INTRAVENOUS at 06:05

## 2020-05-18 RX ADMIN — CLINDAMYCIN PHOSPHATE 900 MG: 900 INJECTION, SOLUTION INTRAVENOUS at 09:05

## 2020-05-18 RX ADMIN — ROCURONIUM BROMIDE 50 MG: 10 INJECTION, SOLUTION INTRAVENOUS at 08:05

## 2020-05-18 RX ADMIN — PROPOFOL 100 MG: 10 INJECTION, EMULSION INTRAVENOUS at 08:05

## 2020-05-18 RX ADMIN — MIDAZOLAM HYDROCHLORIDE 1 MG: 5 INJECTION, SOLUTION INTRAMUSCULAR; INTRAVENOUS at 07:05

## 2020-05-18 RX ADMIN — AZTREONAM 1000 MG: 1 INJECTION, POWDER, LYOPHILIZED, FOR SOLUTION INTRAMUSCULAR; INTRAVENOUS at 05:05

## 2020-05-18 RX ADMIN — ACETAMINOPHEN 1000 MG: 10 INJECTION, SOLUTION INTRAVENOUS at 08:05

## 2020-05-18 RX ADMIN — ONDANSETRON 4 MG: 2 INJECTION INTRAMUSCULAR; INTRAVENOUS at 08:05

## 2020-05-18 RX ADMIN — NEOSTIGMINE METHYLSULFATE 3 MG: 1 INJECTION INTRAVENOUS at 09:05

## 2020-05-18 RX ADMIN — FENTANYL CITRATE 50 MCG: 50 INJECTION INTRAMUSCULAR; INTRAVENOUS at 08:05

## 2020-05-18 NOTE — TRANSFER OF CARE
"Anesthesia Transfer of Care Note    Patient: Shara Hutchins    Procedure(s) Performed: Procedure(s) (LRB):  CLOSURE, ILEOSTOMY (N/A)    Patient location: PACU    Anesthesia Type: general    Transport from OR: Transported from OR on room air with adequate spontaneous ventilation    Post pain: adequate analgesia    Post assessment: no apparent anesthetic complications    Post vital signs: stable    Level of consciousness: awake and alert    Nausea/Vomiting: no nausea/vomiting    Complications: none    Transfer of care protocol was followed      Last vitals:   Visit Vitals  BP (!) 156/70 (BP Location: Left arm, Patient Position: Lying)   Pulse 91   Temp 36.9 °C (98.5 °F) (Oral)   Resp 18   Ht 5' 7.5" (1.715 m)   Wt 55.8 kg (123 lb)   SpO2 (!) 94%   Breastfeeding? No   BMI 18.98 kg/m²     "

## 2020-05-18 NOTE — OP NOTE
Surgery Date: 5/18/2020     Surgeon(s) and Role:     * Robbi Lovell III, MD - Primary    Assisting Surgeon: None    Pre-op Diagnosis:  High output ileostomy [R19.8, Z93.2]    Post-op Diagnosis:  Post-Op Diagnosis Codes:     * High output ileostomy [R19.8, Z93.2]    Procedure(s) (LRB):  CLOSURE, ILEOSTOMY (N/A)    Anesthesia: General    Description of Procedure: The patient was brought to the operating room and transferred to the operating room table in the supine position.  She was given general anesthesia and intubated. The loop ileostomy was sutured closed.  Her abdomen was prepped and draped.  An elliptical incision was made around the ostomy.  Dissection was carried to the wall of the bowel and down to the fascia. The fascial edges were adhere to the bowel. These adhesions were taken down circumferentially. This allowed us entrance into the abdomen.  There were soft small bowel adhesions that were taken down.  The Bowel was completely freed from the fascial edges and abdominal wall.  The ileum appeared healthy.  A DARI 55 stapler was used to transect the ileostomy mouth. This was done separately for the afferent and efferent limbs. A side to side stapled anastomosis was performed between the two limbs of  ileum to reestablish intestinal continuity.  A TA 60 stapler was used to complete the bowel closure.  The TA staple line was over sewn with silk suture.  The intestine were returned to the abdominal cavity.  The fascia was closed with a running #1 PDS.  The skin was loosely closed with staples and ele were left between several staples.  She was extubated and brought to the recovery room in stable condition.      Description of the findings of the procedure: stapled side to side anastomosis performed     Estimated Blood Loss: 5mL     Complications none    Instrument counts correct          Specimens:   Specimen (12h ago, onward)     Start     Ordered    05/18/20 0904  Specimen to Pathology - Surgery  Once      Comments:  Pre-op Diagnosis: High output ileostomy [R19.8, Z93.2]Procedure(s):CLOSURE, ILEOSTOMY Number of specimens: 1Name of specimens: illeostomy mouth      05/18/20 0904

## 2020-05-18 NOTE — ANESTHESIA POSTPROCEDURE EVALUATION
Anesthesia Post Evaluation    Patient: Shara Hutchins    Procedure(s) Performed: Procedure(s) (LRB):  CLOSURE, ILEOSTOMY (N/A)    Final Anesthesia Type: general    Patient location during evaluation: PACU  Patient participation: Yes- Able to Participate  Level of consciousness: awake and alert and oriented  Post-procedure vital signs: reviewed and stable  Pain management: adequate  Airway patency: patent    PONV status at discharge: No PONV  Anesthetic complications: no      Cardiovascular status: blood pressure returned to baseline, hemodynamically stable and stable  Respiratory status: unassisted, spontaneous ventilation and room air  Hydration status: euvolemic  Follow-up not needed.          Vitals Value Taken Time   /70 5/18/2020 12:45 PM   Temp 36.8 °C (98.2 °F) 5/18/2020 12:30 PM   Pulse 81 5/18/2020 12:48 PM   Resp 16 5/18/2020 12:48 PM   SpO2 97 % 5/18/2020 12:48 PM   Vitals shown include unvalidated device data.      No case tracking events are documented in the log.      Pain/Kvng Score: Pain Rating Prior to Med Admin: 5 (5/18/2020 12:13 PM)  Kvng Score: 8 (5/18/2020 12:30 PM)

## 2020-05-18 NOTE — BRIEF OP NOTE
[facility]  Brief Operative Note    SUMMARY     Surgery Date: 5/18/2020     Surgeon(s) and Role:     * Robbi Lovell III, MD - Primary    Assisting Surgeon: None    Pre-op Diagnosis:  High output ileostomy [R19.8, Z93.2]    Post-op Diagnosis:  Post-Op Diagnosis Codes:     * High output ileostomy [R19.8, Z93.2]    Procedure(s) (LRB):  CLOSURE, ILEOSTOMY (N/A)    Anesthesia: General    Description of Procedure: The patient was brought to the operating room and transferred to the operating room table in the supine position.  She was given general anesthesia and intubated. The loop ileostomy was sutured closed.  Her abdomen was prepped and draped.  An elliptical incision was made around the ostomy.  Dissection was carried to the wall of the bowel and down to the fascia. The fascial edges were adhere to the bowel. These adhesions were taken down circumferentially. This allowed us entrance into the abdomen.  There were soft small bowel adhesions that were taken down.  The Bowel was completely freed from the fascial edges and abdominal wall.  The ileum appeared healthy.  A DARI 55 stapler was used to transect the ileostomy mouth. This was done separately for the afferent and efferent limbs. A side to side stapled anastomosis was performed between the two limbs of  ileum to reestablish intestinal continuity.  A TA 60 stapler was used to complete the bowel closure.  The TA staple line was over sewn with silk suture.  The intestine were returned to the abdominal cavity.  The fascia was closed with a running #1 PDS.  The skin was loosely closed with staples and ele were left between several staples.  She was extubated and brought to the recovery room in stable condition.      Description of the findings of the procedure: stapled side to side anastomosis performed     Estimated Blood Loss: 5mL     Complications none    Instrument counts correct          Specimens:   Specimen (12h ago, onward)     Start      Ordered    05/18/20 0904  Specimen to Pathology - Surgery  Once     Comments:  Pre-op Diagnosis: High output ileostomy [R19.8, Z93.2]Procedure(s):CLOSURE, ILEOSTOMY Number of specimens: 1Name of specimens: illeostomy mouth      05/18/20 0904

## 2020-05-18 NOTE — SIGNIFICANT EVENT
Hospitalist Progress Note    70-year-old female with extensive past medical history who was discharged today from LTAC and taken to operating room for reversal of high output ileostomy by General surgery Dr. Lovell.  Subsequently I was asked to admit this patient to the hospital for postoperative care and management.  The patient was personally seen and examined in the recovery room.  Currently she has good control of pain and nausea symptoms postoperatively.  She appears stable.  Will admit to [facility] unit for postoperative care and management.  Plan of care discussed with General surgery Dr. Lovell as well as nursing.  NPO except ice chips for now and trial of clear liquids tomorrow morning. Full H&P to follow.     Elias Raymond MD  [facility] Hospitalist

## 2020-05-18 NOTE — ANESTHESIA PREPROCEDURE EVALUATION
05/18/2020  Shara Hutchins is a 70 y.o., female.  Patient Active Problem List   Diagnosis    Inflammatory bowel disease    Iron deficiency anemia due to chronic blood loss    Vitamin D deficiency    Stage 5 chronic kidney disease not on chronic dialysis    Hisotry of Vulvar cancer    Anemia of decreased vitamin B12 absorption    Anemia due to stage 5 chronic kidney disease treated with erythropoietin    Hypothyroidism    Anemia of chronic disease    Nausea & vomiting    Chronic diarrhea    Cellulitis    S/P ileostomy    DARCI (acute kidney injury)    DNR (do not resuscitate)    Severe dehydration due to chronic diarrhea/high output ileostomy    Electrolyte imbalance    Severe malnutrition    History of difficult venous access    Unstageable pressure injury of skin and tissue    Debility    Malnutrition    Acute hypoxemic respiratory failure    Pulmonary hypertension                                                                                                                  05/18/2020  Shara Hutchins is a 70 y.o., female   Patient Active Problem List   Diagnosis    Inflammatory bowel disease    Iron deficiency anemia due to chronic blood loss    Vitamin D deficiency    Stage 5 chronic kidney disease not on chronic dialysis    Hisotry of Vulvar cancer    Anemia of decreased vitamin B12 absorption    Anemia due to stage 5 chronic kidney disease treated with erythropoietin    Hypothyroidism    Anemia of chronic disease    Nausea & vomiting    Chronic diarrhea    Cellulitis    S/P ileostomy    DARCI (acute kidney injury)    DNR (do not resuscitate)    Severe dehydration due to chronic diarrhea/high output ileostomy    Electrolyte imbalance    Severe malnutrition    History of difficult venous access    Unstageable pressure injury of skin and tissue    Debility     Malnutrition    Acute hypoxemic respiratory failure    Pulmonary hypertension       Past Surgical History:   Procedure Laterality Date    ABDOMINAL SURGERY      AV FISTULA PLACEMENT Left 2018    BACK SURGERY      carpel tunnel surgery once on each hand      CERVICAL FUSION      x 4    cervix repair      CHOLECYSTECTOMY  2000    DIAGNOSTIC LAPAROSCOPY N/A 2/13/2020    Procedure: LAPAROSCOPY, DIAGNOSTIC;  Surgeon: Robbi Lovell III, MD;  Location: [facility];  Service: General;  Laterality: N/A;    HYSTERECTOMY  1980    ILEOSTOMY N/A 2/13/2020    Procedure: CREATION, ILEOSTOMY Loop;  Surgeon: Robbi Lovell III, MD;  Location: [facility] OR;  Service: General;  Laterality: N/A;    LUMBAR LAMINECTOMY      LYSIS OF ADHESIONS N/A 2/13/2020    Procedure: LYSIS, ADHESIONS;  Surgeon: Robbi Lovell III, MD;  Location: [facility] OR;  Service: General;  Laterality: N/A;    NECK SURGERY      PHLEBOGRAPHY  2/28/2020    Procedure: VENOGRAM;  Surgeon: Robbi Lovell III, MD;  Location: [facility] OR;  Service: General;;    REMOVAL OF VASCULAR ACCESS PORT Right 2/13/2020    Procedure: REMOVAL, VASCULAR ACCESS PORT;  Surgeon: Robbi Lovell III, MD;  Location: [facility];  Service: General;  Laterality: Right;    SHOULDER SURGERY      vulva cancer          Tobacco Use:  The patient  reports that she quit smoking about 19 years ago. Her smoking use included cigarettes. She has a 33.00 pack-year smoking history. She quit smokeless tobacco use about 20 years ago.     Results for orders placed or performed during the hospital encounter of 02/23/20   EKG 12-lead    Collection Time: 03/03/20 12:42 PM    Narrative    Test Reason : R07.9,    Vent. Rate : 061 BPM     Atrial Rate : 061 BPM     P-R Int : 176 ms          QRS Dur : 082 ms      QT Int : 412 ms       P-R-T Axes : 073 008 034 degrees     QTc Int : 414 ms    Normal sinus rhythm  Cannot rule out Inferior infarct (cited on or before 10-FEB-2020)  Possible Anterior  infarct ,age undetermined  Abnormal ECG  When compared with ECG of 10-FEB-2020 13:27,  No significant change was found  Confirmed by Camilo Preciado MD ([id number]) on 3/3/2020 3:36:47 PM    Referred By: [email or web address]   SELF           Confirmed By:Camilo Preciado MD             Lab Results   Component Value Date    WBC 4.85 05/15/2020    HGB 8.4 (L) 05/15/2020    HCT 27.1 (L) 05/15/2020     (H) 05/15/2020     05/15/2020     BMP  Lab Results   Component Value Date     05/15/2020    K 5.0 05/15/2020     05/15/2020    CO2 27 05/15/2020    BUN 21 05/15/2020    CREATININE 2.1 (H) 05/15/2020    CALCIUM 8.7 05/15/2020    ANIONGAP 2 (L) 05/15/2020    ESTGFRAFRICA 26.9 (A) 05/15/2020    EGFRNONAA 23.3 (A) 05/15/2020       3/2020 ECHO    · Mild concentric left ventricular hypertrophy.  · Pulmonary hypertension present.  · Normal left ventricular systolic function. The estimated ejection fraction is 60%.  · Grade I (mild) left ventricular diastolic dysfunction consistent with impaired relaxation.  · No wall motion abnormalities.  · Normal right ventricular systolic function.  · Intermediate central venous pressure (8 mmHg).  · Trivial pericardial effusion.  · There is a left pleural effusion.  · The estimated PA systolic pressure is 47 mmHg        Anesthesia Evaluation    I have reviewed the Patient Summary Reports.    I have reviewed the Nursing Notes.   I have reviewed the Medications.     Review of Systems  Anesthesia Hx:  No problems with previous Anesthesia  History of prior surgery of interest to airway management or planning: cervical fusion. Denies Family Hx of Anesthesia complications.   Denies Personal Hx of Anesthesia complications.   Social:  Former Smoker    Hematology/Oncology:         -- Anemia: Hematology Comments: Chronic anemia, easy bruising Current/Recent Cancer. Oncology Comments: Vulvar and ovarian cancer s/p surgery, radiation, chemo. Pt has right chest port that she reports is non  functional.     Cardiovascular:   Exercise tolerance: good Hypertension Per pt, antihypertensives discontinued approx 3 weeks ago due to hypotension Functional Capacity good / => 4 METS    Pulmonary:   COPD, mild Sinus drainage for past few weeks, no recent fevers, abx, steorid use   Renal/:   Chronic Renal Disease, CRI LUE AVF but no history of HD, AVF with multiple vascular stents   Hepatic/GI:   IBS, short gut syndrome, chronic diarrhea; current nausea and abdominal pain; vomiting previously this admission   Musculoskeletal:  Spine Disorders: cervical and lumbar    Neurological:   Peripheral Neuropathy (bilateral feet)    Endocrine:   Diabetes (previous hx, no medications currently) Hypothyroidism        Physical Exam  General:  Malnutrition    Airway/Jaw/Neck:  Airway Findings: Mouth Opening: Normal Mallampati: II  TM Distance: Normal, at least 6 cm  Jaw/Neck Findings:  Neck ROM: Normal ROM      Dental:  Dental Findings: (lower bridge) Upper Dentures    Chest/Lungs:  Chest/Lungs Findings: Clear to auscultation, Normal Respiratory Rate     Heart/Vascular:  Heart Findings: Rate: Normal  Rhythm: Regular Rhythm  Sounds: Normal  Vascular Findings:  Dialysis Access: AV Fistula LUE  Vascular Exam Findings: Right chest port (per patient, not functioning)       Skin:  Skin Findings:  Ecchymosis     Mental Status:  Mental Status Findings:  Cooperative, Alert and Oriented         Anesthesia Plan  Type of Anesthesia, risks & benefits discussed:  Anesthesia Type:  general  Patient's Preference:   Intra-op Monitoring Plan: standard ASA monitors and central line  Intra-op Monitoring Plan Comments:   Post Op Pain Control Plan: multimodal analgesia and IV/PO Opioids PRN  Post Op Pain Control Plan Comments:   Induction:   IV  Beta Blocker:  Patient is not currently on a Beta-Blocker (No further documentation required).       Informed Consent: Patient understands risks and agrees with Anesthesia plan.  Questions answered.  Anesthesia consent signed with patient.  ASA Score: 3     Day of Surgery Review of History & Physical:    H&P update referred to the surgeon.     Anesthesia Plan Notes: GETA, RSI for active n/v  Multimodal: Ofirmev, ketamine  PONV prophylaxis: Zofran, pepcid  Existing TLC for vascular access        Ready For Surgery From Anesthesia Perspective.         Past Surgical History:   Procedure Laterality Date    ABDOMINAL SURGERY      AV FISTULA PLACEMENT Left 2018    BACK SURGERY      carpel tunnel surgery once on each hand      CERVICAL FUSION      x 4    cervix repair      CHOLECYSTECTOMY  2000    DIAGNOSTIC LAPAROSCOPY N/A 2/13/2020    Procedure: LAPAROSCOPY, DIAGNOSTIC;  Surgeon: Robbi Lovell III, MD;  Location: [facility] OR;  Service: General;  Laterality: N/A;    HYSTERECTOMY  1980    ILEOSTOMY N/A 2/13/2020    Procedure: CREATION, ILEOSTOMY Loop;  Surgeon: Robbi Lovell III, MD;  Location: [facility] OR;  Service: General;  Laterality: N/A;    LUMBAR LAMINECTOMY      LYSIS OF ADHESIONS N/A 2/13/2020    Procedure: LYSIS, ADHESIONS;  Surgeon: Robbi Lovell III, MD;  Location: [facility] OR;  Service: General;  Laterality: N/A;    NECK SURGERY      PHLEBOGRAPHY  2/28/2020    Procedure: VENOGRAM;  Surgeon: Robbi Lovell III, MD;  Location: [facility] OR;  Service: General;;    REMOVAL OF VASCULAR ACCESS PORT Right 2/13/2020    Procedure: REMOVAL, VASCULAR ACCESS PORT;  Surgeon: Robbi Lovell III, MD;  Location: [facility] OR;  Service: General;  Laterality: Right;    SHOULDER SURGERY      vulva cancer          Tobacco Use:  The patient  reports that she quit smoking about 19 years ago. Her smoking use included cigarettes. She has a 33.00 pack-year smoking history. She quit smokeless tobacco use about 20 years ago.     Results for orders placed or performed during the hospital encounter of 02/23/20   EKG 12-lead    Collection Time: 03/03/20 12:42 PM    Narrative    Test Reason : R07.9,    Vent. Rate : 061  BPM     Atrial Rate : 061 BPM     P-R Int : 176 ms          QRS Dur : 082 ms      QT Int : 412 ms       P-R-T Axes : 073 008 034 degrees     QTc Int : 414 ms    Normal sinus rhythm  Cannot rule out Inferior infarct (cited on or before 10-FEB-2020)  Possible Anterior infarct ,age undetermined  Abnormal ECG  When compared with ECG of 10-FEB-2020 13:27,  No significant change was found  Confirmed by Camilo Preciado MD ([id number]) on 3/3/2020 3:36:47 PM    Referred By: [email or web address]   SELF           Confirmed By:Camilo Preciado MD             Lab Results   Component Value Date    WBC 4.85 05/15/2020    HGB 8.4 (L) 05/15/2020    HCT 27.1 (L) 05/15/2020     (H) 05/15/2020     05/15/2020     BMP  Lab Results   Component Value Date     05/15/2020    K 5.0 05/15/2020     05/15/2020    CO2 27 05/15/2020    BUN 21 05/15/2020    CREATININE 2.1 (H) 05/15/2020    CALCIUM 8.7 05/15/2020    ANIONGAP 2 (L) 05/15/2020    ESTGFRAFRICA 26.9 (A) 05/15/2020    EGFRNONAA 23.3 (A) 05/15/2020           Anesthesia Evaluation    I have reviewed the Patient Summary Reports.    I have reviewed the Nursing Notes.   I have reviewed the Medications.     Review of Systems  Anesthesia Hx:  No problems with previous Anesthesia  Denies Family Hx of Anesthesia complications.   Denies Personal Hx of Anesthesia complications.   Social:  Non-Smoker, No Alcohol Use    Hematology/Oncology:  Hematology Normal        EENT/Dental:EENT/Dental Normal   Cardiovascular:   Hypertension    Pulmonary:  Pulmonary Normal    Renal/:   Chronic Renal Disease, CRI    Hepatic/GI:  Hepatic/GI Normal    Musculoskeletal:  Musculoskeletal Normal    Neurological:   Neuromuscular Disease, (LTAC)    Endocrine:   Diabetes, type 2 Hypothyroidism    Psych:  Psychiatric Normal           Physical Exam  General:  Well nourished    Airway/Jaw/Neck:  Airway Findings: Mouth Opening: Normal Tongue: Normal  General Airway Assessment: Adult  Mallampati: II  Improves to II with  phonation.  TM Distance: Normal, at least 6 cm  Jaw/Neck Findings:  Neck ROM: Extension Decreased, Mild      Dental:  Dental Findings: Upper partial dentures, Lower partial dentures   Chest/Lungs:  Chest/Lungs Findings: Clear to auscultation, Normal Respiratory Rate     Heart/Vascular:  Heart Findings: Rate: Normal  Rhythm: Regular Rhythm  Sounds: Normal        Mental Status:  Mental Status Findings:  Cooperative, Alert and Oriented         Anesthesia Plan  Type of Anesthesia, risks & benefits discussed:  Anesthesia Type:  general  Patient's Preference:   Intra-op Monitoring Plan: standard ASA monitors  Intra-op Monitoring Plan Comments:   Post Op Pain Control Plan: multimodal analgesia and per primary service following discharge from PACU  Post Op Pain Control Plan Comments:   Induction:   IV  Beta Blocker:  Patient is not currently on a Beta-Blocker (No further documentation required).       Informed Consent: Patient understands risks and agrees with Anesthesia plan.  Questions answered. Anesthesia consent signed with patient.  ASA Score: 3     Day of Surgery Review of History & Physical:    H&P update referred to the surgeon.     Anesthesia Plan Notes: Ofirmev, Ketamine for multimodal  Zofran, Pepcid         Ready For Surgery From Anesthesia Perspective.

## 2020-05-18 NOTE — PLAN OF CARE
05/18/20 1401   PRE-TX-O2   O2 Device (Oxygen Therapy) nasal cannula   $ Is the patient on Low Flow Oxygen? Yes   Flow (L/min) 2   SpO2 98 %   Pulse Oximetry Type Continuous   $ Pulse Oximetry - Multiple Charge Pulse Oximetry - Multiple   Pulse 82   Resp 14   Respiratory Evaluation   $ Care Plan Tech Time 15 min

## 2020-05-19 PROBLEM — D62 ACUTE BLOOD LOSS AS CAUSE OF POSTOPERATIVE ANEMIA: Status: ACTIVE | Noted: 2020-05-19

## 2020-05-19 LAB
ABO + RH BLD: NORMAL
ALBUMIN SERPL BCP-MCNC: 2.6 G/DL (ref 3.5–5.2)
ALP SERPL-CCNC: 163 U/L (ref 55–135)
ALT SERPL W/O P-5'-P-CCNC: 24 U/L (ref 10–44)
ANION GAP SERPL CALC-SCNC: 4 MMOL/L (ref 8–16)
AST SERPL-CCNC: 46 U/L (ref 10–40)
BASOPHILS # BLD AUTO: 0 K/UL (ref 0–0.2)
BASOPHILS NFR BLD: 0 % (ref 0–1.9)
BILIRUB SERPL-MCNC: 1.3 MG/DL (ref 0.1–1)
BLD GP AB SCN CELLS X3 SERPL QL: NORMAL
BLD PROD TYP BPU: NORMAL
BLD PROD TYP BPU: NORMAL
BLOOD UNIT EXPIRATION DATE: NORMAL
BLOOD UNIT EXPIRATION DATE: NORMAL
BLOOD UNIT TYPE CODE: 6200
BLOOD UNIT TYPE CODE: 6200
BLOOD UNIT TYPE: NORMAL
BLOOD UNIT TYPE: NORMAL
BUN SERPL-MCNC: 19 MG/DL (ref 8–23)
CALCIUM SERPL-MCNC: 8.1 MG/DL (ref 8.7–10.5)
CHLORIDE SERPL-SCNC: 113 MMOL/L (ref 95–110)
CO2 SERPL-SCNC: 20 MMOL/L (ref 23–29)
CODING SYSTEM: NORMAL
CODING SYSTEM: NORMAL
CREAT SERPL-MCNC: 1.8 MG/DL (ref 0.5–1.4)
DIFFERENTIAL METHOD: ABNORMAL
DISPENSE STATUS: NORMAL
DISPENSE STATUS: NORMAL
EOSINOPHIL # BLD AUTO: 0 K/UL (ref 0–0.5)
EOSINOPHIL NFR BLD: 0.2 % (ref 0–8)
ERYTHROCYTE [DISTWIDTH] IN BLOOD BY AUTOMATED COUNT: 16 % (ref 11.5–14.5)
EST. GFR  (AFRICAN AMERICAN): 32.4 ML/MIN/1.73 M^2
EST. GFR  (NON AFRICAN AMERICAN): 28.1 ML/MIN/1.73 M^2
GLUCOSE SERPL-MCNC: 102 MG/DL (ref 70–110)
GLUCOSE SERPL-MCNC: 103 MG/DL (ref 70–110)
GLUCOSE SERPL-MCNC: 85 MG/DL (ref 70–110)
GLUCOSE SERPL-MCNC: 86 MG/DL (ref 70–110)
HCT VFR BLD AUTO: 18.7 % (ref 37–48.5)
HGB BLD-MCNC: 6 G/DL (ref 12–16)
IMM GRANULOCYTES # BLD AUTO: 0.03 K/UL (ref 0–0.04)
IMM GRANULOCYTES NFR BLD AUTO: 0.5 % (ref 0–0.5)
LYMPHOCYTES # BLD AUTO: 0.5 K/UL (ref 1–4.8)
LYMPHOCYTES NFR BLD: 8.9 % (ref 18–48)
MAGNESIUM SERPL-MCNC: 1.4 MG/DL (ref 1.6–2.6)
MCH RBC QN AUTO: 31.9 PG (ref 27–31)
MCHC RBC AUTO-ENTMCNC: 32.1 G/DL (ref 32–36)
MCV RBC AUTO: 100 FL (ref 82–98)
MONOCYTES # BLD AUTO: 0.4 K/UL (ref 0.3–1)
MONOCYTES NFR BLD: 5.9 % (ref 4–15)
NEUTROPHILS # BLD AUTO: 5 K/UL (ref 1.8–7.7)
NEUTROPHILS NFR BLD: 84.5 % (ref 38–73)
NRBC BLD-RTO: 0 /100 WBC
NUM UNITS TRANS PACKED RBC: NORMAL
NUM UNITS TRANS PACKED RBC: NORMAL
PLATELET # BLD AUTO: 151 K/UL (ref 150–350)
PMV BLD AUTO: 9.9 FL (ref 9.2–12.9)
POTASSIUM SERPL-SCNC: 5.5 MMOL/L (ref 3.5–5.1)
PROT SERPL-MCNC: 4.8 G/DL (ref 6–8.4)
RBC # BLD AUTO: 1.88 M/UL (ref 4–5.4)
SODIUM SERPL-SCNC: 137 MMOL/L (ref 136–145)
WBC # BLD AUTO: 5.95 K/UL (ref 3.9–12.7)

## 2020-05-19 PROCEDURE — 99900035 HC TECH TIME PER 15 MIN (STAT)

## 2020-05-19 PROCEDURE — 36430 TRANSFUSION BLD/BLD COMPNT: CPT

## 2020-05-19 PROCEDURE — P9016 RBC LEUKOCYTES REDUCED: HCPCS

## 2020-05-19 PROCEDURE — 12000002 HC ACUTE/MED SURGE SEMI-PRIVATE ROOM

## 2020-05-19 PROCEDURE — C9113 INJ PANTOPRAZOLE SODIUM, VIA: HCPCS | Performed by: INTERNAL MEDICINE

## 2020-05-19 PROCEDURE — 80053 COMPREHEN METABOLIC PANEL: CPT

## 2020-05-19 PROCEDURE — 94761 N-INVAS EAR/PLS OXIMETRY MLT: CPT

## 2020-05-19 PROCEDURE — 25000003 PHARM REV CODE 250: Performed by: SURGERY

## 2020-05-19 PROCEDURE — 83735 ASSAY OF MAGNESIUM: CPT

## 2020-05-19 PROCEDURE — 27000221 HC OXYGEN, UP TO 24 HOURS

## 2020-05-19 PROCEDURE — 63600175 PHARM REV CODE 636 W HCPCS: Performed by: INTERNAL MEDICINE

## 2020-05-19 PROCEDURE — 86901 BLOOD TYPING SEROLOGIC RH(D): CPT

## 2020-05-19 PROCEDURE — 86920 COMPATIBILITY TEST SPIN: CPT

## 2020-05-19 PROCEDURE — 85025 COMPLETE CBC W/AUTO DIFF WBC: CPT

## 2020-05-19 PROCEDURE — 25000003 PHARM REV CODE 250: Performed by: INTERNAL MEDICINE

## 2020-05-19 PROCEDURE — S0073 INJECTION, AZTREONAM, 500 MG: HCPCS | Performed by: INTERNAL MEDICINE

## 2020-05-19 RX ORDER — HYDROMORPHONE HYDROCHLORIDE 1 MG/ML
0.5 INJECTION, SOLUTION INTRAMUSCULAR; INTRAVENOUS; SUBCUTANEOUS EVERY 4 HOURS PRN
Status: DISCONTINUED | OUTPATIENT
Start: 2020-05-19 | End: 2020-05-19

## 2020-05-19 RX ORDER — PANTOPRAZOLE SODIUM 40 MG/10ML
40 INJECTION, POWDER, LYOPHILIZED, FOR SOLUTION INTRAVENOUS DAILY
Status: DISCONTINUED | OUTPATIENT
Start: 2020-05-20 | End: 2020-05-20

## 2020-05-19 RX ORDER — HYDROCODONE BITARTRATE AND ACETAMINOPHEN 500; 5 MG/1; MG/1
TABLET ORAL
Status: DISCONTINUED | OUTPATIENT
Start: 2020-05-19 | End: 2020-05-23 | Stop reason: HOSPADM

## 2020-05-19 RX ORDER — HYDROMORPHONE HYDROCHLORIDE 1 MG/ML
0.5 INJECTION, SOLUTION INTRAMUSCULAR; INTRAVENOUS; SUBCUTANEOUS EVERY 4 HOURS PRN
Status: DISCONTINUED | OUTPATIENT
Start: 2020-05-19 | End: 2020-05-22

## 2020-05-19 RX ORDER — ONDANSETRON 2 MG/ML
4 INJECTION INTRAMUSCULAR; INTRAVENOUS EVERY 6 HOURS PRN
Status: DISCONTINUED | OUTPATIENT
Start: 2020-05-19 | End: 2020-05-19

## 2020-05-19 RX ORDER — LEVOTHYROXINE SODIUM ANHYDROUS 100 UG/5ML
25 INJECTION, POWDER, LYOPHILIZED, FOR SOLUTION INTRAVENOUS
Status: DISCONTINUED | OUTPATIENT
Start: 2020-05-19 | End: 2020-05-20

## 2020-05-19 RX ADMIN — AZTREONAM 1000 MG: 1 INJECTION, POWDER, LYOPHILIZED, FOR SOLUTION INTRAMUSCULAR; INTRAVENOUS at 12:05

## 2020-05-19 RX ADMIN — PANTOPRAZOLE SODIUM 40 MG: 40 INJECTION, POWDER, FOR SOLUTION INTRAVENOUS at 09:05

## 2020-05-19 RX ADMIN — ONDANSETRON 4 MG: 2 INJECTION INTRAMUSCULAR; INTRAVENOUS at 04:05

## 2020-05-19 RX ADMIN — AZTREONAM 1000 MG: 1 INJECTION, POWDER, LYOPHILIZED, FOR SOLUTION INTRAMUSCULAR; INTRAVENOUS at 09:05

## 2020-05-19 RX ADMIN — HYDROMORPHONE HYDROCHLORIDE 0.5 MG: 1 INJECTION, SOLUTION INTRAMUSCULAR; INTRAVENOUS; SUBCUTANEOUS at 02:05

## 2020-05-19 RX ADMIN — HYDROMORPHONE HYDROCHLORIDE 0.5 MG: 1 INJECTION, SOLUTION INTRAMUSCULAR; INTRAVENOUS; SUBCUTANEOUS at 07:05

## 2020-05-19 RX ADMIN — HYDROMORPHONE HYDROCHLORIDE 0.5 MG: 1 INJECTION, SOLUTION INTRAMUSCULAR; INTRAVENOUS; SUBCUTANEOUS at 04:05

## 2020-05-19 RX ADMIN — ONDANSETRON 4 MG: 2 INJECTION INTRAMUSCULAR; INTRAVENOUS at 01:05

## 2020-05-19 RX ADMIN — CLINDAMYCIN PHOSPHATE 900 MG: 900 INJECTION, SOLUTION INTRAVENOUS at 01:05

## 2020-05-19 RX ADMIN — CLINDAMYCIN PHOSPHATE 900 MG: 900 INJECTION, SOLUTION INTRAVENOUS at 04:05

## 2020-05-19 RX ADMIN — ONDANSETRON 4 MG: 2 INJECTION INTRAMUSCULAR; INTRAVENOUS at 07:05

## 2020-05-19 RX ADMIN — LEVOTHYROXINE SODIUM ANHYDROUS 25 MCG: 100 INJECTION, POWDER, LYOPHILIZED, FOR SOLUTION INTRAVENOUS at 04:05

## 2020-05-19 RX ADMIN — HYDROMORPHONE HYDROCHLORIDE 0.5 MG: 1 INJECTION, SOLUTION INTRAMUSCULAR; INTRAVENOUS; SUBCUTANEOUS at 10:05

## 2020-05-19 RX ADMIN — CLINDAMYCIN PHOSPHATE 900 MG: 900 INJECTION, SOLUTION INTRAVENOUS at 08:05

## 2020-05-19 NOTE — PT/OT/SLP PROGRESS
Occupational Therapy      Patient Name:  Shara Hutchins   MRN:  [id number]    Patient not seen today secondary to Nursing care(pt still receiving blood ).    Antonietta Ricardo OT  5/19/2020

## 2020-05-19 NOTE — PLAN OF CARE
05/19/20 1038   PRE-TX-O2   O2 Device (Oxygen Therapy) nasal cannula   $ Is the patient on Low Flow Oxygen? Yes   Flow (L/min) 2   SpO2 (!) 94 %   Pulse Oximetry Type Continuous   $ Pulse Oximetry - Multiple Charge Pulse Oximetry - Multiple   Pulse 97   Resp 16   Respiratory Evaluation   $ Care Plan Tech Time 15 min

## 2020-05-19 NOTE — NURSING
Wound care done to stage 2 to coccyx mepilex applied.  Barrier cream applied to inner thigh areas that are red and excoriated with good relief.

## 2020-05-19 NOTE — PT/OT/SLP PROGRESS
Occupational Therapy      Patient Name:  Shara Hutchins   MRN:  [id number]    Patient not seen today secondary to Patient ill (Comment), Other (Comment)(5/19/2020 HOLD at this time; pt with CL H/H of 6.0/18.7 ).    Antonietta Ricardo OT  5/19/2020

## 2020-05-19 NOTE — NURSING
Pt made aware of critical H&H and transfusion orders.  Type and screen drawn and new blood label placed on patient.

## 2020-05-19 NOTE — RESPIRATORY THERAPY
05/18/20 2055   Patient Assessment/Suction   Level of Consciousness (AVPU)   (sleeping)   Respiratory Effort Unlabored   Expansion/Accessory Muscles/Retractions expansion symmetric;no retractions;no use of accessory muscles   All Lung Fields Breath Sounds diminished   Rhythm/Pattern, Respiratory no shortness of breath reported;pattern regular;unlabored   PRE-TX-O2   O2 Device (Oxygen Therapy) nasal cannula   Flow (L/min) 2   SpO2 98 %   Pulse Oximetry Type Continuous   $ Pulse Oximetry - Multiple Charge Pulse Oximetry - Multiple   Pulse 89   Resp 14   Respiratory Evaluation   $ Care Plan Tech Time 15 min   Evaluation For New Orders   Admitting Diagnosis Reversal of  ileostomy   Oxygen Care Plan   Oxygen Care Plan Per Protocol   SPO2 Goal (%) MD order   Rationale Post-op recovery

## 2020-05-19 NOTE — PT/OT/SLP PROGRESS
Physical Therapy      Patient Name:  Shara Hutchins   MRN:  [id number]    Patient not seen today secondary to Unavailable (Comment)(Pt getting 2 untis of blood due to critically low HH. Pt has only completed one unit. ). Will follow-up 05/20/20.    Chapis Clifton, PT

## 2020-05-19 NOTE — HPI
70-year-old female with history of hypertension, diabetes, chronic kidney disease stage 5, anemia of chronic disease, irritable bowel disease, hypothyroidism, vitamin-D deficiency, B12 deficiency, pulmonary hypertension, and vulvar cancer status post radiation with prolonged hospitalization for proctitis and pneumatosis of right colon status post exploratory laparotomy with lysis of adhesions and loop ileostomy placement on February 13th.  Subsequently she had extremely high output from the ileostomy causing dehydration issues.  She has been at an LTAC receiving IV fluids to keep up with fluid demands.  She was originally on TPN after the surgery but has been weaned off.  Today the patient was discharged from LTAC and presented to the hospital for ileostomy reversal. Subsequently I was asked to admit the patient to the hospital for postoperative care and management.     Today the patient reports postoperative pain of abdomen, constant timing, currently mild intensity and controlled with narcotic medications.  She reports no nausea or vomiting.  No chest pain or shortness of breath.  No fever or chills.  She reports severe diarrhea recently with multiple episodes of watery diarrhea per day.  No bleeding, melena hematochezia, or hematemesis.  No headache, dysuria, or cough.

## 2020-05-19 NOTE — PROGRESS NOTES
Pt has a Hb of 6 and is post op ileostomy reversal.   Pt is symptomatic with dyspnea    Plan:  Transfuse 2 units of PRBC's

## 2020-05-19 NOTE — H&P
[facility] Medicine  History & Physical    Patient Name: Shara Hutchins  MRN: [id number]  Admission Date: 5/18/2020  Attending Physician: Elias Raymond MD   Primary Care Provider: April Horton MD  Date of service:  Late entry-the patient was personally seen and examined in the PACU on 05/18/2020 approximately 11:45 a.m.    Patient information was obtained from patient and ER records.  I discussed the case with surgeon Dr. Lovell.     Subjective:     Principal Problem:Status post reversal of ileostomy    Chief Complaint:   Chief Complaint   Patient presents with    Abdominal Pain        HPI: 70-year-old female with history of hypertension, diabetes, chronic kidney disease stage 5, anemia of chronic disease, irritable bowel disease, hypothyroidism, vitamin-D deficiency, B12 deficiency, pulmonary hypertension, and vulvar cancer status post radiation with prolonged hospitalization for proctitis and pneumatosis of right colon status post exploratory laparotomy with lysis of adhesions and loop ileostomy placement on February 13th.  Subsequently she had extremely high output from the ileostomy causing dehydration issues.  She has been at an LTAC receiving IV fluids to keep up with fluid demands.  She was originally on TPN after the surgery but has been weaned off.  Today the patient was discharged from LTAC and presented to the hospital for ileostomy reversal. Subsequently I was asked to admit the patient to the hospital for postoperative care and management.     Today the patient reports postoperative pain of abdomen, constant timing, currently mild intensity and controlled with narcotic medications.  She reports no nausea or vomiting.  No chest pain or shortness of breath.  No fever or chills.  She reports severe diarrhea recently with multiple episodes of watery diarrhea per day.  No bleeding, melena hematochezia, or hematemesis.  No headache, dysuria, or cough.    Past Medical History:    Diagnosis Date    Chronic kidney disease, stage III (moderate) 1/7/2018    Diabetes mellitus, type 2     Fatigue     Hypertension     Iron deficiency anemia due to chronic blood loss 1/7/2018    Vulvar cancer 1/7/2019       Past Surgical History:   Procedure Laterality Date    ABDOMINAL SURGERY      AV FISTULA PLACEMENT Left 2018    BACK SURGERY      carpel tunnel surgery once on each hand      CERVICAL FUSION      x 4    cervix repair      CHOLECYSTECTOMY  2000    DIAGNOSTIC LAPAROSCOPY N/A 2/13/2020    Procedure: LAPAROSCOPY, DIAGNOSTIC;  Surgeon: Robbi Lovell III, MD;  Location: [facility];  Service: General;  Laterality: N/A;    HYSTERECTOMY  1980    ILEOSTOMY N/A 2/13/2020    Procedure: CREATION, ILEOSTOMY Loop;  Surgeon: Robbi Lovell III, MD;  Location: [facility] OR;  Service: General;  Laterality: N/A;    LUMBAR LAMINECTOMY      LYSIS OF ADHESIONS N/A 2/13/2020    Procedure: LYSIS, ADHESIONS;  Surgeon: Robbi Lovell III, MD;  Location: [facility] OR;  Service: General;  Laterality: N/A;    NECK SURGERY      PHLEBOGRAPHY  2/28/2020    Procedure: VENOGRAM;  Surgeon: Robbi Lovell III, MD;  Location: [facility] OR;  Service: General;;    REMOVAL OF VASCULAR ACCESS PORT Right 2/13/2020    Procedure: REMOVAL, VASCULAR ACCESS PORT;  Surgeon: Robbi Lovell III, MD;  Location: [facility];  Service: General;  Laterality: Right;    SHOULDER SURGERY      vulva cancer         Review of patient's allergies indicates:   Allergen Reactions    Ciprofloxacin Other (See Comments)     Elevated liver enzymes      Pcn [penicillins] Anaphylaxis       No current facility-administered medications on file prior to encounter.      Current Outpatient Medications on File Prior to Encounter   Medication Sig    acetaminophen (TYLENOL) 325 MG tablet Take 2 tablets (650 mg total) by mouth every 4 (four) hours as needed.    cholestyramine-aspartame (QUESTRAN LIGHT) 4 gram PwPk Take 1 packet (4 g total) by  mouth 2 (two) times daily.    levothyroxine (SYNTHROID) 88 MCG tablet Take 1 tablet (88 mcg total) by mouth before breakfast.    pantoprazole (PROTONIX) 40 mg injection Inject 40 mg into the vein 2 (two) times daily.    psyllium husk, with sugar, 3.4 gram packet Take 2 packets by mouth 3 (three) times daily.    dextrose 50% injection Inject 25 mLs (12.5 g total) into the vein as needed (between 51 - 70 mg/dL if patient cannnot take PO but has IV access.).    dextrose 50% injection Inject 50 mLs (25 g total) into the vein as needed (less than 50 mg/dL if patient cannnot take PO but has IV access.).    glucose 4 GM chewable tablet Take 4 tablets (16 g total) by mouth as needed.    melatonin (MELATIN) 3 mg tablet Take 3 tablets (9 mg total) by mouth nightly as needed for Insomnia.    ondansetron (ZOFRAN-ODT) 8 MG TbDL Take 1 tablet (8 mg total) by mouth every 8 (eight) hours as needed.    oxyCODONE-acetaminophen (PERCOCET) 5-325 mg per tablet Take 1 tablet by mouth every 4 (four) hours as needed.     Family History     Problem Relation (Age of Onset)    Cancer Mother    Heart disease Mother, Father, Sister    Hypertension Mother    No Known Problems Daughter        Tobacco Use    Smoking status: Former Smoker     Packs/day: 1.00     Years: 33.00     Pack years: 33.00     Types: Cigarettes     Last attempt to quit: 2000     Years since quittin.8    Smokeless tobacco: Former User     Quit date:    Substance and Sexual Activity    Alcohol use: No    Drug use: No    Sexual activity: Not on file     Review of Systems complete 10 point review of systems negative except as per HPI  Objective:     Vital Signs (Most Recent):  Temp: 99.2 °F (37.3 °C) (20)  Pulse: 98 (20)  Resp: 16 (20)  BP: (!) 155/68 (20)  SpO2: (!) 94 % (20) Vital Signs (24h Range):  Temp:  [97.7 °F (36.5 °C)-99.2 °F (37.3 °C)] 99.2 °F (37.3 °C)  Pulse:  [77-98] 98  Resp:   [12-20] 16  SpO2:  [94 %-99 %] 94 %  BP: (132-176)/(61-87) 155/68     Weight: 65.1 kg (143 lb 8.3 oz)  Body mass index is 22.48 kg/m².    Physical Exam  General:  Drowsy but arousable, comfortable appearing, examined in PACU  Head and eyes:  Anicteric sclerae, no conjunctival discharge  ENT:  Dry mucous membranes, no thrush  Cardiovascular:  2+ radial pulses, regular rate and rhythm  Pulmonary:  Comfortable work breathing, lungs are clear to auscultation bilaterally  GI:  Abdomen postoperative site dressed with abdominal binder in place, appropriately tender to palpation  Skin:  Dry and warm no jaundice  Neuro:  Nonfocal motor exam, fluent speech, alert and oriented  Psych:  Mood is calm, affect normal insight fair     Significant Labs:  Hemoglobin 8.4  Hematocrit 27  Creatinine 2.1      Assessment/Plan:     Assessment:  High output ileostomy status post reversal  Vulvar cancer status post radiation  Severe protein calorie malnutrition  Hypertension  Diabetes mellitus type 2  Anemia chronic disease  CKD 3  Hypothyroidism  Pulmonary hypertension  IBD    Plan:  Admit to [facility] unit for postoperative care and management  Appreciate General surgery Dr. Lovell  NPO except ice chips for now and trial of clear liquids tomorrow morning   Postoperative supportive care including pain control and antiemetics as needed  IV fluids. Daily labs.  Replace electrolytes as needed.  Dietitian consultation  PT/OT evaluations  Continue home medications for chronic issues  Sliding scale insulin for euglycemia  VTE prophylaxis mechanical; consider low-molecular weight heparin when postoperative bleeding risk acceptable for surgery hopefully next 24-48 hours  High risk secondary to severe exacerbation of chronic illness requiring open surgical procedure today; parenteral controlled substances    VTE Risk Mitigation (From admission, onward)         Ordered     Place JA hose  Until discontinued      05/18/20 5925     Reason for No  Pharmacological VTE Prophylaxis  Once     Question:  Reasons:  Answer:  Already adequately anticoagulated on oral Anticoagulants    05/18/20 1157     IP VTE HIGH RISK PATIENT  Once      05/18/20 1157     Place sequential compression device  Until discontinued      05/18/20 1157                Elias Raymond MD  Department of Hospital Medicine   [facility]

## 2020-05-19 NOTE — SUBJECTIVE & OBJECTIVE
Past Medical History:   Diagnosis Date    Chronic kidney disease, stage III (moderate) 1/7/2018    Diabetes mellitus, type 2     Fatigue     Hypertension     Iron deficiency anemia due to chronic blood loss 1/7/2018    Vulvar cancer 1/7/2019       Past Surgical History:   Procedure Laterality Date    ABDOMINAL SURGERY      AV FISTULA PLACEMENT Left 2018    BACK SURGERY      carpel tunnel surgery once on each hand      CERVICAL FUSION      x 4    cervix repair      CHOLECYSTECTOMY  2000    DIAGNOSTIC LAPAROSCOPY N/A 2/13/2020    Procedure: LAPAROSCOPY, DIAGNOSTIC;  Surgeon: Robbi Lovell III, MD;  Location: [facility] OR;  Service: General;  Laterality: N/A;    HYSTERECTOMY  1980    ILEOSTOMY N/A 2/13/2020    Procedure: CREATION, ILEOSTOMY Loop;  Surgeon: Robbi Lovell III, MD;  Location: [facility] OR;  Service: General;  Laterality: N/A;    LUMBAR LAMINECTOMY      LYSIS OF ADHESIONS N/A 2/13/2020    Procedure: LYSIS, ADHESIONS;  Surgeon: Robbi Lovell III, MD;  Location: [facility] OR;  Service: General;  Laterality: N/A;    NECK SURGERY      PHLEBOGRAPHY  2/28/2020    Procedure: VENOGRAM;  Surgeon: Robbi Lovell III, MD;  Location: [facility] OR;  Service: General;;    REMOVAL OF VASCULAR ACCESS PORT Right 2/13/2020    Procedure: REMOVAL, VASCULAR ACCESS PORT;  Surgeon: Robbi Lovell III, MD;  Location: [facility];  Service: General;  Laterality: Right;    SHOULDER SURGERY      vulva cancer         Review of patient's allergies indicates:   Allergen Reactions    Ciprofloxacin Other (See Comments)     Elevated liver enzymes      Pcn [penicillins] Anaphylaxis       No current facility-administered medications on file prior to encounter.      Current Outpatient Medications on File Prior to Encounter   Medication Sig    acetaminophen (TYLENOL) 325 MG tablet Take 2 tablets (650 mg total) by mouth every 4 (four) hours as needed.    cholestyramine-aspartame (QUESTRAN LIGHT) 4 gram PwPk Take 1  packet (4 g total) by mouth 2 (two) times daily.    levothyroxine (SYNTHROID) 88 MCG tablet Take 1 tablet (88 mcg total) by mouth before breakfast.    pantoprazole (PROTONIX) 40 mg injection Inject 40 mg into the vein 2 (two) times daily.    psyllium husk, with sugar, 3.4 gram packet Take 2 packets by mouth 3 (three) times daily.    dextrose 50% injection Inject 25 mLs (12.5 g total) into the vein as needed (between 51 - 70 mg/dL if patient cannnot take PO but has IV access.).    dextrose 50% injection Inject 50 mLs (25 g total) into the vein as needed (less than 50 mg/dL if patient cannnot take PO but has IV access.).    glucose 4 GM chewable tablet Take 4 tablets (16 g total) by mouth as needed.    melatonin (MELATIN) 3 mg tablet Take 3 tablets (9 mg total) by mouth nightly as needed for Insomnia.    ondansetron (ZOFRAN-ODT) 8 MG TbDL Take 1 tablet (8 mg total) by mouth every 8 (eight) hours as needed.    oxyCODONE-acetaminophen (PERCOCET) 5-325 mg per tablet Take 1 tablet by mouth every 4 (four) hours as needed.     Family History     Problem Relation (Age of Onset)    Cancer Mother    Heart disease Mother, Father, Sister    Hypertension Mother    No Known Problems Daughter        Tobacco Use    Smoking status: Former Smoker     Packs/day: 1.00     Years: 33.00     Pack years: 33.00     Types: Cigarettes     Last attempt to quit: 2000     Years since quittin.8    Smokeless tobacco: Former User     Quit date:    Substance and Sexual Activity    Alcohol use: No    Drug use: No    Sexual activity: Not on file     Review of Systems complete 10 point review of systems negative except as per HPI  Objective:     Vital Signs (Most Recent):  Temp: 99.2 °F (37.3 °C) (20)  Pulse: 98 (20)  Resp: 16 (20)  BP: (!) 155/68 (20)  SpO2: (!) 94 % (20) Vital Signs (24h Range):  Temp:  [97.7 °F (36.5 °C)-99.2 °F (37.3 °C)] 99.2 °F (37.3 °C)  Pulse:   [77-98] 98  Resp:  [12-20] 16  SpO2:  [94 %-99 %] 94 %  BP: (132-176)/(61-87) 155/68     Weight: 65.1 kg (143 lb 8.3 oz)  Body mass index is 22.48 kg/m².    Physical Exam  General:  Drowsy but arousable, comfortable appearing, examined in PACU  Head and eyes:  Anicteric sclerae, no conjunctival discharge  ENT:  Dry mucous membranes, no thrush  Cardiovascular:  2+ radial pulses, regular rate and rhythm  Pulmonary:  Comfortable work breathing, lungs are clear to auscultation bilaterally  GI:  Abdomen postoperative site dressed with abdominal binder in place, appropriately tender to palpation  Skin:  Dry and warm no jaundice  Neuro:  Nonfocal motor exam, fluent speech, alert and oriented  Psych:  Mood is calm, affect normal insight fair     Significant Labs:  Hemoglobin 8.4  Hematocrit 27  Creatinine 2.1

## 2020-05-20 LAB
ALBUMIN SERPL BCP-MCNC: 2.9 G/DL (ref 3.5–5.2)
ALP SERPL-CCNC: 168 U/L (ref 55–135)
ALT SERPL W/O P-5'-P-CCNC: 20 U/L (ref 10–44)
ANION GAP SERPL CALC-SCNC: 6 MMOL/L (ref 8–16)
AST SERPL-CCNC: 30 U/L (ref 10–40)
BASOPHILS # BLD AUTO: 0.02 K/UL (ref 0–0.2)
BASOPHILS NFR BLD: 0.3 % (ref 0–1.9)
BILIRUB SERPL-MCNC: 1.4 MG/DL (ref 0.1–1)
BUN SERPL-MCNC: 21 MG/DL (ref 8–23)
CALCIUM SERPL-MCNC: 8.8 MG/DL (ref 8.7–10.5)
CHLORIDE SERPL-SCNC: 108 MMOL/L (ref 95–110)
CO2 SERPL-SCNC: 22 MMOL/L (ref 23–29)
CREAT SERPL-MCNC: 2 MG/DL (ref 0.5–1.4)
DIFFERENTIAL METHOD: ABNORMAL
EOSINOPHIL # BLD AUTO: 0 K/UL (ref 0–0.5)
EOSINOPHIL NFR BLD: 0.3 % (ref 0–8)
ERYTHROCYTE [DISTWIDTH] IN BLOOD BY AUTOMATED COUNT: 17.5 % (ref 11.5–14.5)
EST. GFR  (AFRICAN AMERICAN): 28.5 ML/MIN/1.73 M^2
EST. GFR  (NON AFRICAN AMERICAN): 24.8 ML/MIN/1.73 M^2
GLUCOSE SERPL-MCNC: 110 MG/DL (ref 70–110)
GLUCOSE SERPL-MCNC: 114 MG/DL (ref 70–110)
GLUCOSE SERPL-MCNC: 73 MG/DL (ref 70–110)
GLUCOSE SERPL-MCNC: 86 MG/DL (ref 70–110)
GLUCOSE SERPL-MCNC: 88 MG/DL (ref 70–110)
HCT VFR BLD AUTO: 26.6 % (ref 37–48.5)
HGB BLD-MCNC: 8.6 G/DL (ref 12–16)
IMM GRANULOCYTES # BLD AUTO: 0.04 K/UL (ref 0–0.04)
IMM GRANULOCYTES NFR BLD AUTO: 0.6 % (ref 0–0.5)
LYMPHOCYTES # BLD AUTO: 0.7 K/UL (ref 1–4.8)
LYMPHOCYTES NFR BLD: 10.3 % (ref 18–48)
MAGNESIUM SERPL-MCNC: 1.4 MG/DL (ref 1.6–2.6)
MCH RBC QN AUTO: 30.8 PG (ref 27–31)
MCHC RBC AUTO-ENTMCNC: 32.3 G/DL (ref 32–36)
MCV RBC AUTO: 95 FL (ref 82–98)
MONOCYTES # BLD AUTO: 0.4 K/UL (ref 0.3–1)
MONOCYTES NFR BLD: 6 % (ref 4–15)
NEUTROPHILS # BLD AUTO: 5.3 K/UL (ref 1.8–7.7)
NEUTROPHILS NFR BLD: 82.5 % (ref 38–73)
NRBC BLD-RTO: 0 /100 WBC
PLATELET # BLD AUTO: 148 K/UL (ref 150–350)
PMV BLD AUTO: 10 FL (ref 9.2–12.9)
POTASSIUM SERPL-SCNC: 5.3 MMOL/L (ref 3.5–5.1)
PROT SERPL-MCNC: 5.6 G/DL (ref 6–8.4)
RBC # BLD AUTO: 2.79 M/UL (ref 4–5.4)
SODIUM SERPL-SCNC: 136 MMOL/L (ref 136–145)
WBC # BLD AUTO: 6.38 K/UL (ref 3.9–12.7)

## 2020-05-20 PROCEDURE — 94761 N-INVAS EAR/PLS OXIMETRY MLT: CPT

## 2020-05-20 PROCEDURE — 27000221 HC OXYGEN, UP TO 24 HOURS

## 2020-05-20 PROCEDURE — 25000003 PHARM REV CODE 250: Performed by: INTERNAL MEDICINE

## 2020-05-20 PROCEDURE — 63600175 PHARM REV CODE 636 W HCPCS: Performed by: INTERNAL MEDICINE

## 2020-05-20 PROCEDURE — 63600175 PHARM REV CODE 636 W HCPCS: Performed by: SURGERY

## 2020-05-20 PROCEDURE — 99900035 HC TECH TIME PER 15 MIN (STAT)

## 2020-05-20 PROCEDURE — 80053 COMPREHEN METABOLIC PANEL: CPT

## 2020-05-20 PROCEDURE — 12000002 HC ACUTE/MED SURGE SEMI-PRIVATE ROOM

## 2020-05-20 PROCEDURE — 85025 COMPLETE CBC W/AUTO DIFF WBC: CPT

## 2020-05-20 PROCEDURE — 83735 ASSAY OF MAGNESIUM: CPT

## 2020-05-20 PROCEDURE — 25000003 PHARM REV CODE 250: Performed by: FAMILY MEDICINE

## 2020-05-20 RX ORDER — LEVOTHYROXINE SODIUM 88 UG/1
88 TABLET ORAL
Status: DISCONTINUED | OUTPATIENT
Start: 2020-05-20 | End: 2020-05-21

## 2020-05-20 RX ORDER — PANTOPRAZOLE SODIUM 40 MG/1
40 TABLET, DELAYED RELEASE ORAL DAILY
Status: DISCONTINUED | OUTPATIENT
Start: 2020-05-20 | End: 2020-05-23 | Stop reason: HOSPADM

## 2020-05-20 RX ORDER — ACETAMINOPHEN 325 MG/1
650 TABLET ORAL EVERY 6 HOURS PRN
Status: DISCONTINUED | OUTPATIENT
Start: 2020-05-20 | End: 2020-05-23 | Stop reason: HOSPADM

## 2020-05-20 RX ADMIN — SODIUM CHLORIDE, SODIUM LACTATE, POTASSIUM CHLORIDE, AND CALCIUM CHLORIDE: .6; .31; .03; .02 INJECTION, SOLUTION INTRAVENOUS at 02:05

## 2020-05-20 RX ADMIN — ACETAMINOPHEN 650 MG: 325 TABLET ORAL at 12:05

## 2020-05-20 RX ADMIN — PANTOPRAZOLE SODIUM 40 MG: 40 TABLET, DELAYED RELEASE ORAL at 11:05

## 2020-05-20 RX ADMIN — ONDANSETRON 4 MG: 2 INJECTION INTRAMUSCULAR; INTRAVENOUS at 05:05

## 2020-05-20 RX ADMIN — ONDANSETRON 4 MG: 2 INJECTION INTRAMUSCULAR; INTRAVENOUS at 09:05

## 2020-05-20 RX ADMIN — SALINE NASAL SPRAY 2 SPRAY: 1.5 SOLUTION NASAL at 08:05

## 2020-05-20 RX ADMIN — LEVOTHYROXINE SODIUM 88 MCG: 88 TABLET ORAL at 10:05

## 2020-05-20 RX ADMIN — HYDROMORPHONE HYDROCHLORIDE 0.5 MG: 1 INJECTION, SOLUTION INTRAMUSCULAR; INTRAVENOUS; SUBCUTANEOUS at 04:05

## 2020-05-20 RX ADMIN — HYDROMORPHONE HYDROCHLORIDE 0.5 MG: 1 INJECTION, SOLUTION INTRAMUSCULAR; INTRAVENOUS; SUBCUTANEOUS at 09:05

## 2020-05-20 RX ADMIN — HYDROMORPHONE HYDROCHLORIDE 0.5 MG: 1 INJECTION, SOLUTION INTRAMUSCULAR; INTRAVENOUS; SUBCUTANEOUS at 08:05

## 2020-05-20 RX ADMIN — ONDANSETRON 4 MG: 2 INJECTION INTRAMUSCULAR; INTRAVENOUS at 11:05

## 2020-05-20 RX ADMIN — HYDROMORPHONE HYDROCHLORIDE 0.5 MG: 1 INJECTION, SOLUTION INTRAMUSCULAR; INTRAVENOUS; SUBCUTANEOUS at 02:05

## 2020-05-20 RX ADMIN — SODIUM POLYSTYRENE SULFONATE 15 G: 15 SUSPENSION ORAL; RECTAL at 08:05

## 2020-05-20 RX ADMIN — SODIUM CHLORIDE, SODIUM LACTATE, POTASSIUM CHLORIDE, AND CALCIUM CHLORIDE: .6; .31; .03; .02 INJECTION, SOLUTION INTRAVENOUS at 12:05

## 2020-05-20 NOTE — PT/OT/SLP PROGRESS
"Occupational Therapy      Patient Name:  Shara Hutchins   MRN:  [id number]    Patient not seen today secondary to Patient unwilling to participate('No; I'm having diarrhea" ).    Antonietta Ricardo OT  5/20/202011:37 AM   "

## 2020-05-20 NOTE — HOSPITAL COURSE
05/19  Had 2 UpRBC for anemia  Pt on clears  Slightly sleepy but not confused    05/20  Pt refused PT/OT  She still has some diarrhhoea    05/21  Pt still having Diarrhhoea  She gives h/o passing clots in foleys bag  Some abdominal pain persists    05/21  Patient Tolerating diet  Cleared by me and Surgical Team to go home but dont want to go because PT/OT team told her she is not ready to go home !  Bath tub bench ordered per OT recommendation and pt doesn't think she need it     05/23: I, Dr. Carbajal assumed care of patient today. She was admitted for ileostomy reversal and has been discharged yesterday; however daughter appealed the discharge process and instead wanted her mother to go to rehab. Of note patient has been deemed stable for discharge to home with home therapy by PT/OT. During my encounter today, patient desired to go home with home PT/OT. Provided lab work to check electrolytes and hemoglobin in 4-5 days. Advised to follow-up with Dr. Lovell from general surgery within a week. Return precautions provided.     Instructions provided to follow up with primary care physician as outpatient. Patient verbalized understanding and is aware to contact primary care physician or return to ED if new or worsening symptoms.    Physical exam on the day of discharge:  General: Patient resting comfortably in no acute distress.  Lungs: CTA. Good air entry.  Cor: Regular rate and rhythm. No murmurs. No pedal edema.  Abd: Soft. Normal bowel sounds  Neuro: A&O x3. Moving all 4 extremities equally

## 2020-05-20 NOTE — PROGRESS NOTES
[facility] Medicine  Progress Note    Patient Name: Shara Hutchins  MRN: [id number]  Patient Class: IP- Inpatient   Admission Date: 5/18/2020  Length of Stay: 1 days  Attending Physician: Elias Raymond MD  Primary Care Provider: April Horton MD        Subjective:     Principal Problem:Status post reversal of ileostomy        HPI:  70-year-old female with history of hypertension, diabetes, chronic kidney disease stage 5, anemia of chronic disease, irritable bowel disease, hypothyroidism, vitamin-D deficiency, B12 deficiency, pulmonary hypertension, and vulvar cancer status post radiation with prolonged hospitalization for proctitis and pneumatosis of right colon status post exploratory laparotomy with lysis of adhesions and loop ileostomy placement on February 13th.  Subsequently she had extremely high output from the ileostomy causing dehydration issues.  She has been at an LTAC receiving IV fluids to keep up with fluid demands.  She was originally on TPN after the surgery but has been weaned off.  Today the patient was discharged from LTAC and presented to the hospital for ileostomy reversal. Subsequently I was asked to admit the patient to the hospital for postoperative care and management.     Today the patient reports postoperative pain of abdomen, constant timing, currently mild intensity and controlled with narcotic medications.  She reports no nausea or vomiting.  No chest pain or shortness of breath.  No fever or chills.  She reports severe diarrhea recently with multiple episodes of watery diarrhea per day.  No bleeding, melena hematochezia, or hematemesis.  No headache, dysuria, or cough.    Overview/Hospital Course:  05/19  Had 2 UpRBC for anemia  Pt on clears  Slightly sleepy but not confused    Interval History:     Review of Systems   Constitutional: Negative for appetite change.   HENT: Negative for congestion.    Eyes: Negative for discharge.   Respiratory: Negative for  chest tightness.    Cardiovascular: Negative for chest pain.   Gastrointestinal: Negative for abdominal distention.   Endocrine: Negative for polydipsia.   Genitourinary: Negative for flank pain.   Musculoskeletal: Positive for arthralgias and back pain.   Skin: Negative for rash.   Neurological: Negative for dizziness.   Psychiatric/Behavioral: Negative for agitation.     Objective:     Vital Signs (Most Recent):  Temp: 99.4 °F (37.4 °C) (05/19/20 1933)  Pulse: (!) 46 (05/19/20 1933)  Resp: 18 (05/19/20 1933)  BP: (!) 153/71 (05/19/20 1933)  SpO2: 96 % (05/19/20 1933) Vital Signs (24h Range):  Temp:  [98.2 °F (36.8 °C)-99.4 °F (37.4 °C)] 99.4 °F (37.4 °C)  Pulse:  [46-98] 46  Resp:  [16-18] 18  SpO2:  [91 %-100 %] 96 %  BP: (126-155)/(67-76) 153/71     Weight: 65.1 kg (143 lb 8.3 oz)  Body mass index is 22.48 kg/m².    Intake/Output Summary (Last 24 hours) at 5/19/2020 2122  Last data filed at 5/19/2020 1700  Gross per 24 hour   Intake 1090 ml   Output 1800 ml   Net -710 ml      Physical Exam   Constitutional: She is oriented to person, place, and time. No distress.   HENT:   Right Ear: External ear normal.   Left Ear: External ear normal.   Eyes: Pupils are equal, round, and reactive to light. EOM are normal.   Neck: Neck supple.   Cardiovascular: Normal rate.   Pulmonary/Chest: Effort normal and breath sounds normal.   Abdominal: Soft. Bowel sounds are normal. She exhibits no distension.   Musculoskeletal: Normal range of motion. She exhibits no edema.   Neurological: She is alert and oriented to person, place, and time.   Skin: Skin is warm.   Psychiatric: She has a normal mood and affect.   Nursing note and vitals reviewed.      Significant Labs:   CMP:   Recent Labs   Lab 05/19/20  0400      K 5.5*   *   CO2 20*   GLU 85   BUN 19   CREATININE 1.8*   CALCIUM 8.1*   PROT 4.8*   ALBUMIN 2.6*   BILITOT 1.3*   ALKPHOS 163*   AST 46*   ALT 24   ANIONGAP 4*   EGFRNONAA 28.1*       Significant Imaging: I  have reviewed all pertinent imaging results/findings within the past 24 hours.      Assessment/Plan:      * Status post reversal of ileostomy  POD#1  Doing fine  On Clears      Acute blood loss as cause of postoperative anemia  S/p 2 U pRBC on 05/19      Debility  PT/OT      S/P ileostomy  H/o  prolonged hospitalization for proctitis and pneumatosis of right colon status post exploratory laparotomy with lysis of adhesions and loop ileostomy placement on February 13th  Following she was placed on LTACH     Hypothyroidism  Continue iv thyroxine as of now      Hisotry of Vulvar cancer  Stable issue      Stage 5 chronic kidney disease not on chronic dialysis  Crt levels on baseline  Continue iv fluids      Iron deficiency anemia due to chronic blood loss  Had pRBC before (this year)  Received 2 UpRBC for postoperative anemia(05/19)        VTE Risk Mitigation (From admission, onward)         Ordered     Place JA hose  Until discontinued      05/18/20 1157     Reason for No Pharmacological VTE Prophylaxis  Once     Question:  Reasons:  Answer:  Already adequately anticoagulated on oral Anticoagulants    05/18/20 1157     IP VTE HIGH RISK PATIENT  Once      05/18/20 1157     Place sequential compression device  Until discontinued      05/18/20 1157                      William Ambrocio MD  Department of Hospital Medicine   [facility]

## 2020-05-20 NOTE — PHYSICIAN QUERY
PT Name: Shara Hutchins  MR #: [id number]    Postoperative Relationship Clarification     CDS/: Alla Grimes RN, CCDS               Contact information:  [phone]    This form is a permanent document in the medical record.    Query Date: May 20, 2020    Dear Provider,  By submitting this query, we are merely seeking further clarification of documentation. Please utilize your independent clinical judgment when addressing the question(s) below.      Supporting Clinical Findings   Location in Medical Record   She has chronic anemia and did receive a PRBC transfusion in late April. Last Hgb was 8.5 on the 11th.   Stage 5 chronic kidney disease not on chronic dialysis  Anemia of chronic disease    Closure, ileostomy  Estimated blood loss: 5mL     Significant Labs:  Hemoglobin 8.4  Hematocrit 27    Pt has a Hb of 6 and is post op ileostomy reversal.   Pt is symptomatic with dyspnea  Plan:  Transfuse 2 units of PRBC's    Acute blood loss as cause of postoperative anemia   Iron deficiency anemia due to chronic blood loss     Acute blood loss as cause of postoperative anemia  S/p 2 U pRBC on 05/19    Iron deficiency anemia due to chronic blood loss  Had pRBC before (this year)  Received 2 UpRBC for postoperative anemia(05/19)   05/18/2020 Interval H & P          05/18/2020 Op note      Hospital Medicine H & P:        05/19/2020 Hospital Medicine note           05/19/2020 General Surgery note      05/19/2020 Hospital Medicine note         Please clarify the term postoperative as it relates to acute blood loss as cause of postoperative anemia.  _________.  [   ] Condition occurred in the postoperative period (not a complication of surgery)   [ X  ] Condition is a complication of surgery   [   ] Other intended meaning (please specify): ____________________________   [  ] Clinically Undetermined

## 2020-05-20 NOTE — PROGRESS NOTES
[facility]  General Surgery  Progress Note    Subjective:     Interval History: No acute events overnight. Complaining of site pain and nausea. No vomiting. Afebrile. No flatus yet     Post-Op Info:  Procedure(s) (LRB):  CLOSURE, ILEOSTOMY (N/A)   1 Day Post-Op      Medications:  Continuous Infusions:   lactated ringers 100 mL/hr at 05/20/20 0034     Scheduled Meds:   levothyroxine  88 mcg Oral Before breakfast    pantoprazole  40 mg Oral Daily    sodium polystyrene  15 g Oral Once     PRN Meds:sodium chloride, acetaminophen, dextrose 50%, dextrose 50%, glucagon (human recombinant), glucose, glucose, HYDROmorphone, insulin aspart U-100, ondansetron, sodium chloride 0.9%, sodium chloride 0.9%     Objective:     Vital Signs (Most Recent):  Temp: 98.8 °F (37.1 °C) (05/20/20 0717)  Pulse: 84 (05/20/20 0717)  Resp: 18 (05/20/20 0717)  BP: (!) 147/75 (05/20/20 0717)  SpO2: 96 % (05/20/20 0717) Vital Signs (24h Range):  Temp:  [98.2 °F (36.8 °C)-99.4 °F (37.4 °C)] 98.8 °F (37.1 °C)  Pulse:  [64-97] 84  Resp:  [16-18] 18  SpO2:  [91 %-100 %] 96 %  BP: (126-154)/(67-76) 147/75       Intake/Output Summary (Last 24 hours) at 5/20/2020 0728  Last data filed at 5/20/2020 0500  Gross per 24 hour   Intake 2290 ml   Output 2100 ml   Net 190 ml       Physical Exam   Constitutional: She is oriented to person, place, and time. No distress.   Pulmonary/Chest: Effort normal. No respiratory distress.   Abdominal: She exhibits no distension. There is tenderness. There is no rebound and no guarding.   Neurological: She is oriented to person, place, and time.         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 continue IVF  -PT  -IS     Robbi Lovell III, MD  General Surgery  [facility]

## 2020-05-20 NOTE — PT/OT/SLP PROGRESS
Occupational Therapy      Patient Name:  Shara Hutchins   MRN:  [id number]    Patient not seen today secondary to Patient unwilling to participate(after collaboration with team, the pt is reporting that she is not going to participate in therapy today).    Antonietta Ricardo OT  5/20/2020

## 2020-05-20 NOTE — PROGRESS NOTES
[facility] Medicine  Progress Note    Patient Name: Shara Hutchins  MRN: [id number]  Patient Class: IP- Inpatient   Admission Date: 5/18/2020  Length of Stay: 2 days  Attending Physician: William Ambrocio MD  Primary Care Provider: April Horton MD        Subjective:     Principal Problem:Status post reversal of ileostomy        HPI:  70-year-old female with history of hypertension, diabetes, chronic kidney disease stage 5, anemia of chronic disease, irritable bowel disease, hypothyroidism, vitamin-D deficiency, B12 deficiency, pulmonary hypertension, and vulvar cancer status post radiation with prolonged hospitalization for proctitis and pneumatosis of right colon status post exploratory laparotomy with lysis of adhesions and loop ileostomy placement on February 13th.  Subsequently she had extremely high output from the ileostomy causing dehydration issues.  She has been at an LTAC receiving IV fluids to keep up with fluid demands.  She was originally on TPN after the surgery but has been weaned off.  Today the patient was discharged from LTAC and presented to the hospital for ileostomy reversal. Subsequently I was asked to admit the patient to the hospital for postoperative care and management.     Today the patient reports postoperative pain of abdomen, constant timing, currently mild intensity and controlled with narcotic medications.  She reports no nausea or vomiting.  No chest pain or shortness of breath.  No fever or chills.  She reports severe diarrhea recently with multiple episodes of watery diarrhea per day.  No bleeding, melena hematochezia, or hematemesis.  No headache, dysuria, or cough.    Overview/Hospital Course:  05/19  Had 2 UpRBC for anemia  Pt on clears  Slightly sleepy but not confused    05/20  Pt refused PT/OT  She still has some diarrhhoea    Interval History:     Review of Systems   Constitutional: Positive for diaphoresis and fatigue. Negative for activity change and  appetite change.   HENT: Negative for congestion and dental problem.    Eyes: Negative for discharge and itching.   Respiratory: Negative for shortness of breath.    Cardiovascular: Negative for chest pain.   Gastrointestinal: Positive for diarrhea. Negative for abdominal distention and abdominal pain.   Endocrine: Negative for cold intolerance.   Genitourinary: Negative for difficulty urinating and dysuria.   Musculoskeletal: Negative for arthralgias and back pain.   Skin: Negative for color change.   Neurological: Negative for dizziness and facial asymmetry.   Hematological: Negative for adenopathy.   Psychiatric/Behavioral: Negative for agitation and behavioral problems.     Objective:     Vital Signs (Most Recent):  Temp: 98.4 °F (36.9 °C) (05/20/20 1434)  Pulse: 84 (05/20/20 1434)  Resp: 18 (05/20/20 1434)  BP: (!) 154/71 (05/20/20 1434)  SpO2: 95 % (05/20/20 1434) Vital Signs (24h Range):  Temp:  [98.4 °F (36.9 °C)-99.4 °F (37.4 °C)] 98.4 °F (36.9 °C)  Pulse:  [64-84] 84  Resp:  [18] 18  SpO2:  [95 %-96 %] 95 %  BP: (137-154)/(71-75) 154/71     Weight: 65.1 kg (143 lb 8.3 oz)  Body mass index is 22.48 kg/m².    Intake/Output Summary (Last 24 hours) at 5/20/2020 1821  Last data filed at 5/20/2020 0725  Gross per 24 hour   Intake 1200 ml   Output 2300 ml   Net -1100 ml      Physical Exam   Constitutional: She is oriented to person, place, and time. No distress.   HENT:   Right Ear: External ear normal.   Left Ear: External ear normal.   Eyes: Pupils are equal, round, and reactive to light. EOM are normal.   Neck: Neck supple.   Cardiovascular: Normal rate.   Pulmonary/Chest: Effort normal and breath sounds normal.   Abdominal: Soft. Bowel sounds are normal. She exhibits no distension.   Musculoskeletal: Normal range of motion. She exhibits no edema.   Neurological: She is alert and oriented to person, place, and time.   Skin: Skin is warm.   Psychiatric: She has a normal mood and affect.   Nursing note and  vitals reviewed.      Significant Labs:   CBC:   Recent Labs   Lab 05/19/20  0400 05/20/20  0445   WBC 5.95 6.38   HGB 6.0* 8.6*   HCT 18.7* 26.6*    148*     CMP:   Recent Labs   Lab 05/19/20  0400 05/20/20  0445    136   K 5.5* 5.3*   * 108   CO2 20* 22*   GLU 85 86   BUN 19 21   CREATININE 1.8* 2.0*   CALCIUM 8.1* 8.8   PROT 4.8* 5.6*   ALBUMIN 2.6* 2.9*   BILITOT 1.3* 1.4*   ALKPHOS 163* 168*   AST 46* 30   ALT 24 20   ANIONGAP 4* 6*   EGFRNONAA 28.1* 24.8*             Assessment/Plan:      * Status post reversal of ileostomy  POD#2  Doing fine  On Clears      Acute blood loss as cause of postoperative anemia  S/p 2 U pRBC on 05/19      Debility  PT/OT      S/P ileostomy  H/o  prolonged hospitalization for proctitis and pneumatosis of right colon status post exploratory laparotomy with lysis of adhesions and loop ileostomy placement on February 13th  Following she was placed on LTACH     Hypothyroidism  Continue Present Rx      Hisotry of Vulvar cancer  Stable issue      Stage 5 chronic kidney disease not on chronic dialysis  Crt levels on baseline  Can D/C iv fluids Tmrw AM     Iron deficiency anemia due to chronic blood loss  Had pRBC before (this year)  Received 2 UpRBC for postoperative anemia(05/19)        VTE Risk Mitigation (From admission, onward)         Ordered     Place JA hose  Until discontinued      05/18/20 1157     Reason for No Pharmacological VTE Prophylaxis  Once     Question:  Reasons:  Answer:  Already adequately anticoagulated on oral Anticoagulants    05/18/20 1157     IP VTE HIGH RISK PATIENT  Once      05/18/20 1157     Place sequential compression device  Until discontinued      05/18/20 1157                      William Amborcio MD  Department of Hospital Medicine   [facility]

## 2020-05-20 NOTE — SUBJECTIVE & OBJECTIVE
Interval History:     Review of Systems   Constitutional: Negative for appetite change.   HENT: Negative for congestion.    Eyes: Negative for discharge.   Respiratory: Negative for chest tightness.    Cardiovascular: Negative for chest pain.   Gastrointestinal: Negative for abdominal distention.   Endocrine: Negative for polydipsia.   Genitourinary: Negative for flank pain.   Musculoskeletal: Positive for arthralgias and back pain.   Skin: Negative for rash.   Neurological: Negative for dizziness.   Psychiatric/Behavioral: Negative for agitation.     Objective:     Vital Signs (Most Recent):  Temp: 99.4 °F (37.4 °C) (05/19/20 1933)  Pulse: (!) 46 (05/19/20 1933)  Resp: 18 (05/19/20 1933)  BP: (!) 153/71 (05/19/20 1933)  SpO2: 96 % (05/19/20 1933) Vital Signs (24h Range):  Temp:  [98.2 °F (36.8 °C)-99.4 °F (37.4 °C)] 99.4 °F (37.4 °C)  Pulse:  [46-98] 46  Resp:  [16-18] 18  SpO2:  [91 %-100 %] 96 %  BP: (126-155)/(67-76) 153/71     Weight: 65.1 kg (143 lb 8.3 oz)  Body mass index is 22.48 kg/m².    Intake/Output Summary (Last 24 hours) at 5/19/2020 2122  Last data filed at 5/19/2020 1700  Gross per 24 hour   Intake 1090 ml   Output 1800 ml   Net -710 ml      Physical Exam   Constitutional: She is oriented to person, place, and time. No distress.   HENT:   Right Ear: External ear normal.   Left Ear: External ear normal.   Eyes: Pupils are equal, round, and reactive to light. EOM are normal.   Neck: Neck supple.   Cardiovascular: Normal rate.   Pulmonary/Chest: Effort normal and breath sounds normal.   Abdominal: Soft. Bowel sounds are normal. She exhibits no distension.   Musculoskeletal: Normal range of motion. She exhibits no edema.   Neurological: She is alert and oriented to person, place, and time.   Skin: Skin is warm.   Psychiatric: She has a normal mood and affect.   Nursing note and vitals reviewed.      Significant Labs:   CMP:   Recent Labs   Lab 05/19/20  0400      K 5.5*   *   CO2 20*   GLU  85   BUN 19   CREATININE 1.8*   CALCIUM 8.1*   PROT 4.8*   ALBUMIN 2.6*   BILITOT 1.3*   ALKPHOS 163*   AST 46*   ALT 24   ANIONGAP 4*   EGFRNONAA 28.1*       Significant Imaging: I have reviewed all pertinent imaging results/findings within the past 24 hours.

## 2020-05-20 NOTE — SUBJECTIVE & OBJECTIVE
Interval History:     Review of Systems   Constitutional: Positive for diaphoresis and fatigue. Negative for activity change and appetite change.   HENT: Negative for congestion and dental problem.    Eyes: Negative for discharge and itching.   Respiratory: Negative for shortness of breath.    Cardiovascular: Negative for chest pain.   Gastrointestinal: Positive for diarrhea. Negative for abdominal distention and abdominal pain.   Endocrine: Negative for cold intolerance.   Genitourinary: Negative for difficulty urinating and dysuria.   Musculoskeletal: Negative for arthralgias and back pain.   Skin: Negative for color change.   Neurological: Negative for dizziness and facial asymmetry.   Hematological: Negative for adenopathy.   Psychiatric/Behavioral: Negative for agitation and behavioral problems.     Objective:     Vital Signs (Most Recent):  Temp: 98.4 °F (36.9 °C) (05/20/20 1434)  Pulse: 84 (05/20/20 1434)  Resp: 18 (05/20/20 1434)  BP: (!) 154/71 (05/20/20 1434)  SpO2: 95 % (05/20/20 1434) Vital Signs (24h Range):  Temp:  [98.4 °F (36.9 °C)-99.4 °F (37.4 °C)] 98.4 °F (36.9 °C)  Pulse:  [64-84] 84  Resp:  [18] 18  SpO2:  [95 %-96 %] 95 %  BP: (137-154)/(71-75) 154/71     Weight: 65.1 kg (143 lb 8.3 oz)  Body mass index is 22.48 kg/m².    Intake/Output Summary (Last 24 hours) at 5/20/2020 1821  Last data filed at 5/20/2020 0725  Gross per 24 hour   Intake 1200 ml   Output 2300 ml   Net -1100 ml      Physical Exam   Constitutional: She is oriented to person, place, and time. No distress.   HENT:   Right Ear: External ear normal.   Left Ear: External ear normal.   Eyes: Pupils are equal, round, and reactive to light. EOM are normal.   Neck: Neck supple.   Cardiovascular: Normal rate.   Pulmonary/Chest: Effort normal and breath sounds normal.   Abdominal: Soft. Bowel sounds are normal. She exhibits no distension.   Musculoskeletal: Normal range of motion. She exhibits no edema.   Neurological: She is alert and  oriented to person, place, and time.   Skin: Skin is warm.   Psychiatric: She has a normal mood and affect.   Nursing note and vitals reviewed.      Significant Labs:   CBC:   Recent Labs   Lab 05/19/20  0400 05/20/20  0445   WBC 5.95 6.38   HGB 6.0* 8.6*   HCT 18.7* 26.6*    148*     CMP:   Recent Labs   Lab 05/19/20 0400 05/20/20  0445    136   K 5.5* 5.3*   * 108   CO2 20* 22*   GLU 85 86   BUN 19 21   CREATININE 1.8* 2.0*   CALCIUM 8.1* 8.8   PROT 4.8* 5.6*   ALBUMIN 2.6* 2.9*   BILITOT 1.3* 1.4*   ALKPHOS 163* 168*   AST 46* 30   ALT 24 20   ANIONGAP 4* 6*   EGFRNONAA 28.1* 24.8*

## 2020-05-20 NOTE — ASSESSMENT & PLAN NOTE
H/o  prolonged hospitalization for proctitis and pneumatosis of right colon status post exploratory laparotomy with lysis of adhesions and loop ileostomy placement on February 13th  Following she was placed on LTACH

## 2020-05-20 NOTE — PT/OT/SLP PROGRESS
"Physical Therapy      Patient Name:  Shara Hutchins   MRN:  [id number]    Patient not seen today secondary to Other (Comment)(Pt declines PT due to stating "the doctor told me I shouldn't get up because they are giving me stuff to have bowel movements". Discussed with RN. ). Will follow-up 05/21/20.    Chapis Clifton, PT    "

## 2020-05-21 PROBLEM — R19.7 DIARRHEA: Status: ACTIVE | Noted: 2020-05-21

## 2020-05-21 PROBLEM — E44.1: Status: ACTIVE | Noted: 2020-05-21

## 2020-05-21 LAB
ALBUMIN SERPL BCP-MCNC: 2.9 G/DL (ref 3.5–5.2)
ALP SERPL-CCNC: 149 U/L (ref 55–135)
ALT SERPL W/O P-5'-P-CCNC: 17 U/L (ref 10–44)
ANION GAP SERPL CALC-SCNC: 10 MMOL/L (ref 8–16)
AST SERPL-CCNC: 19 U/L (ref 10–40)
BASOPHILS # BLD AUTO: 0.02 K/UL (ref 0–0.2)
BASOPHILS NFR BLD: 0.3 % (ref 0–1.9)
BILIRUB SERPL-MCNC: 1.2 MG/DL (ref 0.1–1)
BUN SERPL-MCNC: 23 MG/DL (ref 8–23)
CALCIUM SERPL-MCNC: 8.7 MG/DL (ref 8.7–10.5)
CHLORIDE SERPL-SCNC: 105 MMOL/L (ref 95–110)
CO2 SERPL-SCNC: 21 MMOL/L (ref 23–29)
CREAT SERPL-MCNC: 1.8 MG/DL (ref 0.5–1.4)
DIFFERENTIAL METHOD: ABNORMAL
EOSINOPHIL # BLD AUTO: 0.1 K/UL (ref 0–0.5)
EOSINOPHIL NFR BLD: 1.2 % (ref 0–8)
ERYTHROCYTE [DISTWIDTH] IN BLOOD BY AUTOMATED COUNT: 17.5 % (ref 11.5–14.5)
EST. GFR  (AFRICAN AMERICAN): 32.4 ML/MIN/1.73 M^2
EST. GFR  (NON AFRICAN AMERICAN): 28.1 ML/MIN/1.73 M^2
GLUCOSE SERPL-MCNC: 100 MG/DL (ref 70–110)
GLUCOSE SERPL-MCNC: 102 MG/DL (ref 70–110)
GLUCOSE SERPL-MCNC: 103 MG/DL (ref 70–110)
GLUCOSE SERPL-MCNC: 81 MG/DL (ref 70–110)
GLUCOSE SERPL-MCNC: 86 MG/DL (ref 70–110)
HCT VFR BLD AUTO: 26.1 % (ref 37–48.5)
HGB BLD-MCNC: 8.6 G/DL (ref 12–16)
IMM GRANULOCYTES # BLD AUTO: 0.03 K/UL (ref 0–0.04)
IMM GRANULOCYTES NFR BLD AUTO: 0.5 % (ref 0–0.5)
LYMPHOCYTES # BLD AUTO: 1 K/UL (ref 1–4.8)
LYMPHOCYTES NFR BLD: 16.3 % (ref 18–48)
MAGNESIUM SERPL-MCNC: 1.5 MG/DL (ref 1.6–2.6)
MCH RBC QN AUTO: 30.9 PG (ref 27–31)
MCHC RBC AUTO-ENTMCNC: 33 G/DL (ref 32–36)
MCV RBC AUTO: 94 FL (ref 82–98)
MONOCYTES # BLD AUTO: 0.5 K/UL (ref 0.3–1)
MONOCYTES NFR BLD: 8.1 % (ref 4–15)
NEUTROPHILS # BLD AUTO: 4.4 K/UL (ref 1.8–7.7)
NEUTROPHILS NFR BLD: 73.6 % (ref 38–73)
NRBC BLD-RTO: 0 /100 WBC
PLATELET # BLD AUTO: 165 K/UL (ref 150–350)
PMV BLD AUTO: 10.1 FL (ref 9.2–12.9)
POTASSIUM SERPL-SCNC: 4.1 MMOL/L (ref 3.5–5.1)
PROT SERPL-MCNC: 5.5 G/DL (ref 6–8.4)
RBC # BLD AUTO: 2.78 M/UL (ref 4–5.4)
SODIUM SERPL-SCNC: 136 MMOL/L (ref 136–145)
WBC # BLD AUTO: 5.95 K/UL (ref 3.9–12.7)

## 2020-05-21 PROCEDURE — 97530 THERAPEUTIC ACTIVITIES: CPT

## 2020-05-21 PROCEDURE — 87077 CULTURE AEROBIC IDENTIFY: CPT

## 2020-05-21 PROCEDURE — 87086 URINE CULTURE/COLONY COUNT: CPT

## 2020-05-21 PROCEDURE — 25000003 PHARM REV CODE 250: Performed by: INTERNAL MEDICINE

## 2020-05-21 PROCEDURE — 97535 SELF CARE MNGMENT TRAINING: CPT

## 2020-05-21 PROCEDURE — 63600175 PHARM REV CODE 636 W HCPCS: Performed by: INTERNAL MEDICINE

## 2020-05-21 PROCEDURE — 83735 ASSAY OF MAGNESIUM: CPT

## 2020-05-21 PROCEDURE — 63600175 PHARM REV CODE 636 W HCPCS: Performed by: SURGERY

## 2020-05-21 PROCEDURE — 97165 OT EVAL LOW COMPLEX 30 MIN: CPT

## 2020-05-21 PROCEDURE — 87186 SC STD MICRODIL/AGAR DIL: CPT

## 2020-05-21 PROCEDURE — 97162 PT EVAL MOD COMPLEX 30 MIN: CPT

## 2020-05-21 PROCEDURE — 80053 COMPREHEN METABOLIC PANEL: CPT

## 2020-05-21 PROCEDURE — 12000002 HC ACUTE/MED SURGE SEMI-PRIVATE ROOM

## 2020-05-21 PROCEDURE — 94761 N-INVAS EAR/PLS OXIMETRY MLT: CPT

## 2020-05-21 PROCEDURE — 85025 COMPLETE CBC W/AUTO DIFF WBC: CPT

## 2020-05-21 RX ORDER — CHOLESTYRAMINE 4 G/4.8G
4 POWDER, FOR SUSPENSION ORAL 2 TIMES DAILY
Status: DISCONTINUED | OUTPATIENT
Start: 2020-05-21 | End: 2020-05-23 | Stop reason: HOSPADM

## 2020-05-21 RX ADMIN — LACTOBACILLUS TAB 4 TABLET: TAB at 06:05

## 2020-05-21 RX ADMIN — HYDROMORPHONE HYDROCHLORIDE 0.5 MG: 1 INJECTION, SOLUTION INTRAMUSCULAR; INTRAVENOUS; SUBCUTANEOUS at 02:05

## 2020-05-21 RX ADMIN — PANTOPRAZOLE SODIUM 40 MG: 40 TABLET, DELAYED RELEASE ORAL at 10:05

## 2020-05-21 RX ADMIN — HYDROMORPHONE HYDROCHLORIDE 0.5 MG: 1 INJECTION, SOLUTION INTRAMUSCULAR; INTRAVENOUS; SUBCUTANEOUS at 09:05

## 2020-05-21 RX ADMIN — SODIUM CHLORIDE, SODIUM LACTATE, POTASSIUM CHLORIDE, AND CALCIUM CHLORIDE: .6; .31; .03; .02 INJECTION, SOLUTION INTRAVENOUS at 04:05

## 2020-05-21 RX ADMIN — HYDROMORPHONE HYDROCHLORIDE 0.5 MG: 1 INJECTION, SOLUTION INTRAMUSCULAR; INTRAVENOUS; SUBCUTANEOUS at 06:05

## 2020-05-21 RX ADMIN — ONDANSETRON 4 MG: 2 INJECTION INTRAMUSCULAR; INTRAVENOUS at 10:05

## 2020-05-21 RX ADMIN — ONDANSETRON 4 MG: 2 INJECTION INTRAMUSCULAR; INTRAVENOUS at 09:05

## 2020-05-21 RX ADMIN — CHOLESTYRAMINE 4 G: 4 POWDER, FOR SUSPENSION ORAL at 08:05

## 2020-05-21 RX ADMIN — LEVOTHYROXINE SODIUM 88 MCG: 88 TABLET ORAL at 06:05

## 2020-05-21 NOTE — PLAN OF CARE
Problem: Skin Injury Risk Increased  Goal: Skin Health and Integrity  Outcome: Ongoing, Progressing     Problem: Oral Intake Inadequate  Goal: Improved Oral Intake  Outcome: Ongoing, Progressing       Recommendation/Intervention:   1. Continue diet progression as tolerated by pt   2. Recommend nutrition support if unable to advance diet w/in 24-48 hrs.  3. Replace electrolytes as warranted   4. Recommend obtaining new weight--questionable accuracy  Goals: 1. Pt to tolerate diet progression w/in 24-48 hrs. 2. Electrolytes to trend towards normal limits

## 2020-05-21 NOTE — PROGRESS NOTES
"[facility]  Adult Nutrition   Progress Note (Initial Assessment)     SUMMARY     Recommendations  Recommendation/Intervention:   1. Continue diet progression as tolerated by pt   2. Recommend nutrition support if unable to advance diet w/in 24-48 hrs.  3. Replace electrolytes as warranted   4. Recommend obtaining new weight--questionable accuracy  Goals: 1. Pt to tolerate diet progression w/in 24-48 hrs. 2. Electrolytes to trend towards normal limits  Nutrition Goal Status: new    Dietitian Rounds Brief    Pt assessed 2' NPO/CLD x 3 days. Noted s/p ileostomy reversal (5/18). Tolerating clear liquid diet currently. No emesis, + occasional nausea--chronic for patient. + BMs--watery stools reported which is normal for patient. Weight history per EMR reviewed--recommend obtaining new weight.    Reason for Assessment  Reason For Assessment: other (see comments)(NPO/CLD x 3 days)  Diagnosis: surgery/postoperative complications(reversal of ileostomy)  Relevant Medical History: hypertension, diabetes, chronic kidney disease stage 5, anemia of chronic disease, irritable bowel disease, hypothyroidism, vitamin-D deficiency, B12 deficiency, pulmonary hypertension, and vulvar cancer status post radiation with prolonged hospitalization for proctitis and pneumatosis of right colon status post exploratory laparotomy with lysis of adhesions and loop ileostomy placement (2/13/2020)  Interdisciplinary Rounds: attended    Nutrition Risk Screen  Nutrition Risk Screen: no indicators present    Nutrition/Diet History  Spiritual, Cultural Beliefs, Church Practices, Values that Affect Care: no  Food Allergies: NKFA  Factors Affecting Nutritional Intake: clear liquid diet, decreased appetite    Anthropometrics  Temp: 98.2 °F (36.8 °C)  Height Method: Stated  Height: 5' 7" (170.2 cm)  Height (inches): 67 in  Weight Method: Bed Scale  Weight: 65.1 kg (143 lb 8.3 oz)  Weight (lb): 143.52 lb  Ideal Body Weight (IBW), Female: " 135 lb  % Ideal Body Weight, Female (lb): 106.31 %  BMI (Calculated): 22.5  BMI Grade: 18.5-24.9 - normal       Weight History:  Wt Readings from Last 10 Encounters:   05/21/20 65.1 kg (143 lb 8.3 oz)   05/15/20 56.2 kg (123 lb 14.4 oz)   05/12/20 56.2 kg (124 lb)   03/27/20 56.6 kg (124 lb 12.5 oz)   03/06/20 53 kg (116 lb 13.5 oz)   03/07/20 52.6 kg (116 lb)   03/03/20 53 kg (116 lb 13.5 oz)   02/19/20 58.7 kg (129 lb 4.8 oz)   02/19/20 58.8 kg (129 lb 9.6 oz)   02/14/20 56.7 kg (125 lb)       Lab/Procedures/Meds: Pertinent Labs Reviewed  Clinical Chemistry:  Recent Labs   Lab 05/21/20  0400      K 4.1      CO2 21*   GLU 86   BUN 23   CREATININE 1.8*   CALCIUM 8.7   PROT 5.5*   ALBUMIN 2.9*   BILITOT 1.2*   ALKPHOS 149*   AST 19   ALT 17   ANIONGAP 10   ESTGFRAFRICA 32.4*   EGFRNONAA 28.1*   MG 1.5*     CBC:   Recent Labs   Lab 05/21/20  0400   WBC 5.95   RBC 2.78*   HGB 8.6*   HCT 26.1*      MCV 94   MCH 30.9   MCHC 33.0     Lipid Panel:  No results for input(s): CHOL, HDL, LDLCALC, TRIG, CHOLHDL in the last 168 hours.  Cardiac Profile:  No results for input(s): BNP, CPK, CPKMB, TROPONINI, CKTOTAL in the last 168 hours.  Inflammatory Labs:  No results for input(s): CRP in the last 168 hours.  Diabetes:  No results for input(s): HGBA1C, POCTGLUCOSE in the last 168 hours.  Thyroid & Parathyroid:  No results for input(s): TSH, FREET4, I9YGIXF, S0WLLOV, THYROIDAB in the last 168 hours.  Vitamins:  No results for input(s): FEMEYGRV12, [id number] in the last 168 hours.    Medications: Pertinent Medications reviewed  Scheduled Meds:   levothyroxine  88 mcg Oral Before breakfast    pantoprazole  40 mg Oral Daily     Continuous Infusions:  PRN Meds:.sodium chloride, acetaminophen, dextrose 50%, dextrose 50%, glucagon (human recombinant), glucose, glucose, HYDROmorphone, insulin aspart U-100, ondansetron, sodium chloride, sodium chloride 0.9%, sodium chloride 0.9%    Estimated/Assessed Needs  Weight  Used For Calorie Calculations: 65.1 kg (143 lb 8.3 oz)  Energy Calorie Requirements (kcal): 2761-2757 (25-30 kcal/kg)  Energy Need Method: Kcal/kg  Protein Requirements: 65-78 g (1-1.2 g/kg)  Weight Used For Protein Calculations: 65.1 kg (143 lb 8.3 oz)     Estimated Fluid Requirement Method: RDA Method  RDA Method (mL): 1627       Nutrition Prescription Ordered  Current Diet Order: clear liquids    Evaluation of Received Nutrient/Fluid Intake  IV Fluid (mL): 2400  Energy Calories Required: not meeting needs  Protein Required: not meeting needs  Fluid Required: meeting needs  Tolerance: tolerating     Intake/Output Summary (Last 24 hours) at 5/21/2020 1022  Last data filed at 5/21/2020 0400  Gross per 24 hour   Intake 750 ml   Output 800 ml   Net -50 ml        % Meal Intake: 50 - 75 %    Nutrition Risk  Level of Risk/Frequency of Follow-up: high     Monitor and Evaluation  Food and Nutrient Intake: energy intake, food and beverage intake  Food and Nutrient Adminstration: diet order  Physical Activity and Function: nutrition-related ADLs and IADLs  Anthropometric Measurements: weight change, weight  Biochemical Data, Medical Tests and Procedures: gastrointestinal profile, electrolyte and renal panel, glucose/endocrine profile  Nutrition-Focused Physical Findings: overall appearance     Nutrition Follow-Up  RD Follow-up?: Yes    Altagracia Mcdonough RD 05/21/2020 10:23 AM

## 2020-05-21 NOTE — PT/OT/SLP EVAL
"Occupational Therapy   Evaluation and Discharge Note    Name: Shara Hutchins  MRN: [id number]  Admitting Diagnosis:  Status post reversal of ileostomy 3 Days Post-Op    Recommendations:     Discharge Recommendations: home with home health  Discharge Equipment Recommendations:  bedside commode, bath bench  Barriers to discharge:  None    Assessment:     Shara Hutchins is a 70 y.o. female with a medical diagnosis of Status post reversal of ileostomy. At this time, patient is functioning at their prior level of function and does not require further acute OT services.     Plan:     During this hospitalization, patient does not require further acute OT services.  Please re-consult if situation changes.    · Plan of Care Reviewed with: patient    Subjective     Chief Complaint: "I have diarrhea"  Patient/Family Comments/goals: home to her daughters and take care of herself to eventually live in assisted living   Occupational Profile:  Living Environment: one story house with threshold step to enter   Previous level of function: has been ill for 1-2 months but was Independent Prior to her recent illnesses  Roles and Routines: was living alone a few months ago when she was not in hospital; pt was admitted to [facility] on 3/6 then d/c'd to LTAC on 3/27/2020 therefore has not been home since Early March   Equipment Used at home:  rollator  Assistance upon Discharge: pt states her daughter will assist her at the daughters home    Pain/Comfort:  · Pain Rating 1: 5/10  · Location - Orientation 1: lower  · Location 1: abdomen  · Pain Addressed 1: Pre-medicate for activity, Distraction  · Pain Rating Post-Intervention 1: 5/10    Patients cultural, spiritual, Congregation conflicts given the current situation: no    Objective:     Communicated with: Nursing and PT prior to session.  Patient found HOB elevated with bed alarm, telemetry, peripheral IV, pulse ox (continuous), central line upon OT entry to room.    General Precautions: Standard, fall "   Orthopedic Precautions:N/A   Braces:       Occupational Performance:    Bed Mobility:    · Patient completed Rolling/Turning to Right with modified independence  · Patient completed Scooting/Bridging with modified independence  · Patient completed Supine to Sit with modified independence  · Patient completed Sit to Supine with modified independence    Functional Mobility/Transfers:  · Patient completed Sit <> Stand Transfer with supervision  with  rolling walker   · Patient completed Bed <> Chair Transfer using Step Transfer technique with supervision with rolling walker  · Functional Mobility: static standing with nausea reported     Activities of Daily Living:  · Feeding:  modified independence    · Grooming: set up only due to nause c/o with mobility, otherwise pt is able to complete g/h independent     · Lower Body Dressing: set up to doff/denia socks without c/o pain, good flexibility with crossing her legs    · Toileting: pt is currently dep with toileting due to diarrhea and linen changes, however, OT educated the pt on use of BSC next to the bed that the OT provided for intermittent use during the day to also give her skin a break from constant diarrhea contact; OT educted the pt to call don't fall for now       Cognitive/Visual Perceptual:  Cognitive/Psychosocial Skills:     -       Oriented to: Person, Place, Time and Situation   -       Follows Commands/attention:Follows two-step commands  -       Communication: clear/fluent  -       Memory: No Deficits noted  -       Safety awareness/insight to disability: intact   -       Mood/Affect/Coping skills/emotional control: Appropriate to situation, Cooperative, Flat affect and Pleasant  Visual/Perceptual:      -Intact      Physical Exam:  Balance:    -       good dynamic sitting and good static standing; fair + dynamic standing   Postural examination/scapula alignment:    -       No postural abnormalities identified  Skin integrity: Wound abdominal; pt with  abdominal binder  and    Dominant hand: -       right  Upper Extremity Range of Motion:     -       Right Upper Extremity: WNL  -       Left Upper Extremity: WNL  Upper Extremity Strength:    -       Right Upper Extremity: WNL  -       Left Upper Extremity: WNL   Strength:    -       Right Upper Extremity: WNL  -       Left Upper Extremity: WNL  Fine Motor Coordination:    -       Intact  Gross motor coordination:   WFL    AMPAC 6 Click ADL:  AMPAC Total Score: 20    Treatment & Education: role of OT; call don't fall, benefits of using the BSC for bowel mgt due to extensive diarrhea; OT educated pt on importance of out of bed to chair and functional mobility to negate negative effects of prolonged bed rest. Will progress activity as tolerated by patient; At this time patient only able to tolerate positional changes and transfers due to overall deconditioning.  OT educated the pt to sit in the chair with nursing for overall conditioning benefits to prevent debility as pt currently refuses due to nausea. BSC placed next to the bed with instructions to call for assist OOB.     Patient left HOB elevated with all lines intact, call button in reach, bed alarm on and nurse and CNA notified regarding BSC in the room and pt will call for assist as well as the importance of using BSC and reduce dependency on diaper as a receptacle for skin health.     GOALS:   Multidisciplinary Problems     Occupational Therapy Goals     Not on file                History:     Past Medical History:   Diagnosis Date    Chronic kidney disease, stage III (moderate) 1/7/2018    Diabetes mellitus, type 2     Fatigue     Hypertension     Iron deficiency anemia due to chronic blood loss 1/7/2018    Vulvar cancer 1/7/2019       Past Surgical History:   Procedure Laterality Date    ABDOMINAL SURGERY      AV FISTULA PLACEMENT Left 2018    BACK SURGERY      carpel tunnel surgery once on each hand      CERVICAL FUSION      x 4    cervix  repair      CHOLECYSTECTOMY  2000    DIAGNOSTIC LAPAROSCOPY N/A 2/13/2020    Procedure: LAPAROSCOPY, DIAGNOSTIC;  Surgeon: Robbi Lovell III, MD;  Location: [facility];  Service: General;  Laterality: N/A;    HYSTERECTOMY  1980    ILEOSTOMY N/A 2/13/2020    Procedure: CREATION, ILEOSTOMY Loop;  Surgeon: Robbi Lovell III, MD;  Location: [facility] OR;  Service: General;  Laterality: N/A;    ILEOSTOMY CLOSURE N/A 5/18/2020    Procedure: CLOSURE, ILEOSTOMY;  Surgeon: Robbi Lovell III, MD;  Location: [facility];  Service: General;  Laterality: N/A;    LUMBAR LAMINECTOMY      LYSIS OF ADHESIONS N/A 2/13/2020    Procedure: LYSIS, ADHESIONS;  Surgeon: Robbi Lovell III, MD;  Location: [facility];  Service: General;  Laterality: N/A;    NECK SURGERY      PHLEBOGRAPHY  2/28/2020    Procedure: VENOGRAM;  Surgeon: Robbi Lovell III, MD;  Location: [facility];  Service: General;;    REMOVAL OF VASCULAR ACCESS PORT Right 2/13/2020    Procedure: REMOVAL, VASCULAR ACCESS PORT;  Surgeon: Robbi Lovell III, MD;  Location: [facility];  Service: General;  Laterality: Right;    SHOULDER SURGERY      vulva cancer         Time Tracking:     OT Date of Treatment: 05/21/20  OT Start Time: 1151(2 previous atempts @ 1022, 1127 )  OT Stop Time: 1212  OT Total Time (min): 21 min    Billable Minutes:Evaluation 10  Self Care/Home Management 11    Antonietta Ricardo, OT  5/21/2020

## 2020-05-21 NOTE — NURSING
Patient complained of pressure around her bladder.  Flushed lee using a 50cc syringe.  Dark micheline colored  Urine.  500cc urine in lee bag.  Bladder scan done. After flush, it showed 25ml of urine in bladder  Apply calmoseptine area vaginal area.

## 2020-05-21 NOTE — PLAN OF CARE
05/20/20 2019   Patient Assessment/Suction   Level of Consciousness (AVPU) alert   Respiratory Effort Normal;Unlabored   Expansion/Accessory Muscles/Retractions no use of accessory muscles   All Lung Fields Breath Sounds clear;equal bilaterally   Rhythm/Pattern, Respiratory depth regular;pattern regular;unlabored   Cough Frequency no cough   PRE-TX-O2   O2 Device (Oxygen Therapy) nasal cannula   $ Is the patient on Low Flow Oxygen? Yes   Flow (L/min) 2   SpO2 (!) 94 %   Pulse Oximetry Type Intermittent   $ Pulse Oximetry - Multiple Charge Pulse Oximetry - Multiple   Pulse 76   Resp 18   Respiratory Evaluation   $ Care Plan Tech Time 15 min

## 2020-05-21 NOTE — PLAN OF CARE
Problem: Infection  Goal: Infection Symptom Resolution  Outcome: Ongoing, Progressing     Problem: Wound  Goal: Optimal Wound Healing  Outcome: Ongoing, Progressing     Problem: Fall Injury Risk  Goal: Absence of Fall and Fall-Related Injury  Outcome: Ongoing, Progressing     Problem: Skin Injury Risk Increased  Goal: Skin Health and Integrity  Outcome: Ongoing, Progressing

## 2020-05-21 NOTE — PT/OT/SLP EVAL
Physical Therapy Evaluation    Patient Name:  Shara Hutchins   MRN:  [id number]    Recommendations:     Discharge Recommendations:  home(home with daughter and HH)   Discharge Equipment Recommendations: (tbd)   Barriers to discharge: None    Assessment:     Shara Hutchins is a 70 y.o. female admitted with a medical diagnosis of Status post reversal of ileostomy.  She presents with the following impairments/functional limitations:  weakness, impaired functional mobilty, gait instability, impaired endurance, impaired balance, impaired self care skills, impaired skin, pain . Pt agrees to PT. Pt just finished with bath and being cleaned up, pt is incontinent of bowel and lee intact. Binder intact. O2 2L 97%, RA 92% no sob or dizziness. HH 8.6/26.1    Rehab Prognosis: Good; patient would benefit from acute skilled PT services to address these deficits and reach maximum level of function.    Recent Surgery: Procedure(s) (LRB):  CLOSURE, ILEOSTOMY (N/A) 3 Days Post-Op    Plan:     During this hospitalization, patient to be seen 6 x/week to address the identified rehab impairments via gait training, therapeutic activities, therapeutic exercises and progress toward the following goals:    · Plan of Care Expires:  06/21/20    Subjective     Chief Complaint: abdominal pain  Patient/Family Comments/goals: to go home with daughter.   Pain/Comfort:  · Pain Rating 1: (moderate pain in abdomen, but did not give number, binder intact)  · Location 1: abdomen  · Pain Addressed 1: Pre-medicate for activity, Reposition    Patients cultural, spiritual, Confucianism conflicts given the current situation:      Living Environment:  Pt usually lives alone but has been at [facility] in [place] for few months with minimal PT/OT per pt. Pt was able to ambulate at times with RW with help.   Prior to admission, patients level of function was min assist with RW.  Equipment used at home: rollator.  DME owned (not currently used): none.  Upon discharge, patient  will have assistance from daughter and family.    Objective:     Communicated with rn prior to session.  Patient found supine with bed alarm, oxygen, telemetry, peripheral IV, pulse ox (continuous)  upon PT entry to room.    General Precautions: Standard, fall   Orthopedic Precautions:N/A   Braces: N/A     Exams:  · Cognitive Exam:  Patient is oriented to Person, Place, Time and Situation  · Gross Motor Coordination:  WFL  · RUE ROM: WFL  · LUE ROM: WFL  · RLE ROM: WFL  · RLE Strength: WFL  · LLE ROM: WFL  · LLE Strength: WFL    Functional Mobility:  · Bed Mobility:     · Rolling Left:  minimum assistance  · Rolling Right: minimum assistance  · Scooting: contact guard assistance  · Bridging: contact guard assistance  · Supine to Sit: minimum assistance  · Sit to Supine: minimum assistance  · Transfers:     · Sit to Stand:  minimum assistance with rolling walker  · Gait: Pt able to march in place RW CGA and stand for 5 minutes but became nauseated with dry heaves, no dizziness noted, no sob, good effort.       Therapeutic Activities and Exercises:   education, fall prevention, poc, dc planning, pressure relief.     AM-PAC 6 CLICK MOBILITY  Total Score:16     Patient left supine with all lines intact, call button in reach, bed alarm on, rn notified and extender present. Pt is incontinent of bowel with mobility. RN notified that pt needed nausea meds. Pt returned to bed due to nausea.     GOALS:   Multidisciplinary Problems     Physical Therapy Goals        Problem: Physical Therapy Goal    Goal Priority Disciplines Outcome Goal Variances Interventions   Physical Therapy Goal     PT, PT/OT      Description:  Goals to be met by: discharge     Patient will increase functional independence with mobility by performin. Supine to sit with Stand-by Assistance  2. Sit to stand transfer with Supervision  3. Bed to chair transfer with Supervision using Rolling Walker  4. Gait  x 75 feet with Supervision using Rolling  [place].                       History:     Past Medical History:   Diagnosis Date    Chronic kidney disease, stage III (moderate) 1/7/2018    Diabetes mellitus, type 2     Fatigue     Hypertension     Iron deficiency anemia due to chronic blood loss 1/7/2018    Vulvar cancer 1/7/2019       Past Surgical History:   Procedure Laterality Date    ABDOMINAL SURGERY      AV FISTULA PLACEMENT Left 2018    BACK SURGERY      carpel tunnel surgery once on each hand      CERVICAL FUSION      x 4    cervix repair      CHOLECYSTECTOMY  2000    DIAGNOSTIC LAPAROSCOPY N/A 2/13/2020    Procedure: LAPAROSCOPY, DIAGNOSTIC;  Surgeon: Robbi Lovell III, MD;  Location: [facility];  Service: General;  Laterality: N/A;    HYSTERECTOMY  1980    ILEOSTOMY N/A 2/13/2020    Procedure: CREATION, ILEOSTOMY Loop;  Surgeon: Robbi Lovell III, MD;  Location: [facility];  Service: General;  Laterality: N/A;    ILEOSTOMY CLOSURE N/A 5/18/2020    Procedure: CLOSURE, ILEOSTOMY;  Surgeon: Robbi Lovell III, MD;  Location: [facility];  Service: General;  Laterality: N/A;    LUMBAR LAMINECTOMY      LYSIS OF ADHESIONS N/A 2/13/2020    Procedure: LYSIS, ADHESIONS;  Surgeon: Robbi Lovell III, MD;  Location: [facility];  Service: General;  Laterality: N/A;    NECK SURGERY      PHLEBOGRAPHY  2/28/2020    Procedure: VENOGRAM;  Surgeon: Robbi Lovell III, MD;  Location: [facility];  Service: General;;    REMOVAL OF VASCULAR ACCESS PORT Right 2/13/2020    Procedure: REMOVAL, VASCULAR ACCESS PORT;  Surgeon: Robbi Lovell III, MD;  Location: [facility];  Service: General;  Laterality: Right;    SHOULDER SURGERY      vulva cancer         Time Tracking:     PT Received On: 05/21/20  PT Start Time: 0935     PT Stop Time: 1006  PT Total Time (min): 31 min     Billable Minutes: Evaluation 8 and Therapeutic Activity 23      Chapis Clifton, PT  05/21/2020

## 2020-05-22 PROBLEM — R19.7 DIARRHEA: Status: RESOLVED | Noted: 2020-05-21 | Resolved: 2020-05-22

## 2020-05-22 PROBLEM — D62 ACUTE BLOOD LOSS AS CAUSE OF POSTOPERATIVE ANEMIA: Status: RESOLVED | Noted: 2020-05-19 | Resolved: 2020-05-22

## 2020-05-22 PROBLEM — N18.5 STAGE 5 CHRONIC KIDNEY DISEASE NOT ON CHRONIC DIALYSIS: Chronic | Status: RESOLVED | Noted: 2018-11-04 | Resolved: 2020-05-22

## 2020-05-22 PROBLEM — R53.81 DEBILITY: Status: RESOLVED | Noted: 2020-03-07 | Resolved: 2020-05-22

## 2020-05-22 PROBLEM — D50.0 IRON DEFICIENCY ANEMIA DUE TO CHRONIC BLOOD LOSS: Chronic | Status: RESOLVED | Noted: 2018-01-07 | Resolved: 2020-05-22

## 2020-05-22 LAB
ALBUMIN SERPL BCP-MCNC: 2.8 G/DL (ref 3.5–5.2)
ALP SERPL-CCNC: 133 U/L (ref 55–135)
ALT SERPL W/O P-5'-P-CCNC: 12 U/L (ref 10–44)
ANION GAP SERPL CALC-SCNC: 7 MMOL/L (ref 8–16)
AST SERPL-CCNC: 14 U/L (ref 10–40)
BASOPHILS # BLD AUTO: 0.01 K/UL (ref 0–0.2)
BASOPHILS NFR BLD: 0.2 % (ref 0–1.9)
BILIRUB SERPL-MCNC: 1.2 MG/DL (ref 0.1–1)
BUN SERPL-MCNC: 19 MG/DL (ref 8–23)
CALCIUM SERPL-MCNC: 8.4 MG/DL (ref 8.7–10.5)
CHLORIDE SERPL-SCNC: 106 MMOL/L (ref 95–110)
CO2 SERPL-SCNC: 23 MMOL/L (ref 23–29)
CREAT SERPL-MCNC: 1.5 MG/DL (ref 0.5–1.4)
DIFFERENTIAL METHOD: ABNORMAL
EOSINOPHIL # BLD AUTO: 0.2 K/UL (ref 0–0.5)
EOSINOPHIL NFR BLD: 3.1 % (ref 0–8)
ERYTHROCYTE [DISTWIDTH] IN BLOOD BY AUTOMATED COUNT: 16.8 % (ref 11.5–14.5)
EST. GFR  (AFRICAN AMERICAN): 40.4 ML/MIN/1.73 M^2
EST. GFR  (NON AFRICAN AMERICAN): 35 ML/MIN/1.73 M^2
GLUCOSE SERPL-MCNC: 112 MG/DL (ref 70–110)
GLUCOSE SERPL-MCNC: 83 MG/DL (ref 70–110)
GLUCOSE SERPL-MCNC: 86 MG/DL (ref 70–110)
HCT VFR BLD AUTO: 26.5 % (ref 37–48.5)
HGB BLD-MCNC: 8.7 G/DL (ref 12–16)
IMM GRANULOCYTES # BLD AUTO: 0.02 K/UL (ref 0–0.04)
IMM GRANULOCYTES NFR BLD AUTO: 0.4 % (ref 0–0.5)
LYMPHOCYTES # BLD AUTO: 1 K/UL (ref 1–4.8)
LYMPHOCYTES NFR BLD: 21.1 % (ref 18–48)
MAGNESIUM SERPL-MCNC: 1.3 MG/DL (ref 1.6–2.6)
MCH RBC QN AUTO: 30.6 PG (ref 27–31)
MCHC RBC AUTO-ENTMCNC: 32.8 G/DL (ref 32–36)
MCV RBC AUTO: 93 FL (ref 82–98)
MONOCYTES # BLD AUTO: 0.5 K/UL (ref 0.3–1)
MONOCYTES NFR BLD: 9.8 % (ref 4–15)
NEUTROPHILS # BLD AUTO: 3.1 K/UL (ref 1.8–7.7)
NEUTROPHILS NFR BLD: 65.4 % (ref 38–73)
NRBC BLD-RTO: 0 /100 WBC
PLATELET # BLD AUTO: 173 K/UL (ref 150–350)
PMV BLD AUTO: 10.2 FL (ref 9.2–12.9)
POTASSIUM SERPL-SCNC: 3.3 MMOL/L (ref 3.5–5.1)
PROT SERPL-MCNC: 5.1 G/DL (ref 6–8.4)
RBC # BLD AUTO: 2.84 M/UL (ref 4–5.4)
SODIUM SERPL-SCNC: 136 MMOL/L (ref 136–145)
WBC # BLD AUTO: 4.78 K/UL (ref 3.9–12.7)

## 2020-05-22 PROCEDURE — 97116 GAIT TRAINING THERAPY: CPT

## 2020-05-22 PROCEDURE — 12000002 HC ACUTE/MED SURGE SEMI-PRIVATE ROOM

## 2020-05-22 PROCEDURE — 83735 ASSAY OF MAGNESIUM: CPT

## 2020-05-22 PROCEDURE — 80053 COMPREHEN METABOLIC PANEL: CPT

## 2020-05-22 PROCEDURE — 94761 N-INVAS EAR/PLS OXIMETRY MLT: CPT

## 2020-05-22 PROCEDURE — 99900035 HC TECH TIME PER 15 MIN (STAT)

## 2020-05-22 PROCEDURE — 97530 THERAPEUTIC ACTIVITIES: CPT

## 2020-05-22 PROCEDURE — 25000003 PHARM REV CODE 250: Performed by: INTERNAL MEDICINE

## 2020-05-22 PROCEDURE — 63600175 PHARM REV CODE 636 W HCPCS: Performed by: INTERNAL MEDICINE

## 2020-05-22 PROCEDURE — 25000003 PHARM REV CODE 250: Performed by: FAMILY MEDICINE

## 2020-05-22 PROCEDURE — 85025 COMPLETE CBC W/AUTO DIFF WBC: CPT

## 2020-05-22 RX ORDER — CHOLESTYRAMINE 4 G/4.8G
4 POWDER, FOR SUSPENSION ORAL 2 TIMES DAILY
Qty: 42 PACKET | Refills: 0 | Status: ON HOLD | OUTPATIENT
Start: 2020-05-22 | End: 2020-12-15 | Stop reason: HOSPADM

## 2020-05-22 RX ORDER — LEVOTHYROXINE SODIUM 88 UG/1
88 TABLET ORAL
Status: DISCONTINUED | OUTPATIENT
Start: 2020-05-22 | End: 2020-05-23 | Stop reason: HOSPADM

## 2020-05-22 RX ORDER — PANTOPRAZOLE SODIUM 40 MG/1
40 TABLET, DELAYED RELEASE ORAL DAILY
Qty: 21 TABLET | Refills: 0 | Status: SHIPPED | OUTPATIENT
Start: 2020-05-23 | End: 2020-06-02

## 2020-05-22 RX ORDER — OXYCODONE AND ACETAMINOPHEN 5; 325 MG/1; MG/1
1 TABLET ORAL EVERY 8 HOURS PRN
Qty: 12 TABLET | Refills: 0 | Status: SHIPPED | OUTPATIENT
Start: 2020-05-22 | End: 2020-06-02

## 2020-05-22 RX ORDER — ENOXAPARIN SODIUM 100 MG/ML
40 INJECTION SUBCUTANEOUS
Status: DISCONTINUED | OUTPATIENT
Start: 2020-05-22 | End: 2020-05-23 | Stop reason: HOSPADM

## 2020-05-22 RX ORDER — OXYCODONE AND ACETAMINOPHEN 5; 325 MG/1; MG/1
1 TABLET ORAL EVERY 6 HOURS PRN
Status: DISCONTINUED | OUTPATIENT
Start: 2020-05-22 | End: 2020-05-23 | Stop reason: HOSPADM

## 2020-05-22 RX ORDER — LORAZEPAM 0.5 MG/1
0.5 TABLET ORAL EVERY 6 HOURS PRN
Status: DISCONTINUED | OUTPATIENT
Start: 2020-05-22 | End: 2020-05-23 | Stop reason: HOSPADM

## 2020-05-22 RX ORDER — LORAZEPAM 0.5 MG/1
0.5 TABLET ORAL EVERY 12 HOURS PRN
Qty: 8 TABLET | Refills: 0 | Status: SHIPPED | OUTPATIENT
Start: 2020-05-22 | End: 2020-06-29

## 2020-05-22 RX ADMIN — PANTOPRAZOLE SODIUM 40 MG: 40 TABLET, DELAYED RELEASE ORAL at 09:05

## 2020-05-22 RX ADMIN — OXYCODONE HYDROCHLORIDE AND ACETAMINOPHEN 1 TABLET: 5; 325 TABLET ORAL at 09:05

## 2020-05-22 RX ADMIN — LACTOBACILLUS TAB 4 TABLET: TAB at 04:05

## 2020-05-22 RX ADMIN — LACTOBACILLUS TAB 4 TABLET: TAB at 10:05

## 2020-05-22 RX ADMIN — ONDANSETRON 4 MG: 2 INJECTION INTRAMUSCULAR; INTRAVENOUS at 05:05

## 2020-05-22 RX ADMIN — HYDROMORPHONE HYDROCHLORIDE 0.5 MG: 1 INJECTION, SOLUTION INTRAMUSCULAR; INTRAVENOUS; SUBCUTANEOUS at 05:05

## 2020-05-22 RX ADMIN — LACTOBACILLUS TAB 4 TABLET: TAB at 09:05

## 2020-05-22 RX ADMIN — OXYCODONE HYDROCHLORIDE AND ACETAMINOPHEN 1 TABLET: 5; 325 TABLET ORAL at 10:05

## 2020-05-22 RX ADMIN — LEVOTHYROXINE SODIUM 88 MCG: 88 TABLET ORAL at 05:05

## 2020-05-22 RX ADMIN — OXYCODONE HYDROCHLORIDE AND ACETAMINOPHEN 1 TABLET: 5; 325 TABLET ORAL at 03:05

## 2020-05-22 RX ADMIN — CHOLESTYRAMINE 4 G: 4 POWDER, FOR SUSPENSION ORAL at 09:05

## 2020-05-22 RX ADMIN — ENOXAPARIN SODIUM 40 MG: 100 INJECTION SUBCUTANEOUS at 09:05

## 2020-05-22 NOTE — PT/OT/SLP PROGRESS
Physical Therapy Treatment    Patient Name:  Shara Hutchins   MRN:  [id number]    Recommendations:     Discharge Recommendations:  (pt would like to go home with daughter and possibly need HH. )   Discharge Equipment Recommendations: (rollator. Pt reports she has no equipment. )   Barriers to discharge: None    Assessment:     Shara Hutchins is a 70 y.o. female admitted with a medical diagnosis of Status post reversal of ileostomy.  She presents with the following impairments/functional limitations:  weakness, impaired functional mobilty, gait instability, impaired endurance, impaired balance, impaired self care skills . Pt resting supine, no O2 and sats at 95%. Pt agrees to PT. Pt reports eating full breakfast and not as much diarrhea.     Rehab Prognosis: Good; patient would benefit from acute skilled PT services to address these deficits and reach maximum level of function.    Recent Surgery: Procedure(s) (LRB):  CLOSURE, ILEOSTOMY (N/A) 4 Days Post-Op    Plan:     During this hospitalization, patient to be seen 6 x/week to address the identified rehab impairments via gait training, therapeutic activities, therapeutic exercises and progress toward the following goals:    · Plan of Care Expires:  06/21/20    Subjective     Chief Complaint: fatigue  Patient/Family Comments/goals: to go home with daughter  Pain/Comfort:  · Pain Rating 1: (pain controlled)      Objective:     Communicated with rn prior to session.  Patient found supine with bed alarm, telemetry, peripheral IV, pulse ox (continuous) upon PT entry to room.     General Precautions: Standard, fall   Orthopedic Precautions:N/A   Braces:       Functional Mobility:  · Bed Mobility:     · Rolling Right: modified independence  · Scooting: supervision  · Supine to Sit: supervision  · Sit to Supine: supervision  · Transfers:     · Sit to Stand:  contact guard assistance with rolling walker  · Gait: pt able to ambulate 40 ft in room RW making 4 turns CGA needed, noted  wobbly knees, no dizziness, noted difficulty turning walker and PT recommends rollator for home use. No increase in pain today but signficant fatigue.       AM-PAC 6 CLICK MOBILITY          Therapeutic Activities and Exercises:   education, fall prevention, poc, dc planning, equipment needs; review need to start sit to stand therex but pt too fatigued today. Good effort with ambulation and overall mobility. Pt cont to be motivated to return home with daughter.     Patient left supine with all lines intact, call button in reach and bed alarm on., pulse ox intact, phone near pt. Pt declined to sit in chair due to about to have a bath.     GOALS:   Multidisciplinary Problems     Physical Therapy Goals        Problem: Physical Therapy Goal    Goal Priority Disciplines Outcome Goal Variances Interventions   Physical Therapy Goal     PT, PT/OT Ongoing, Progressing     Description:  Goals to be met by: discharge     Patient will increase functional independence with mobility by performin. Supine to sit with Stand-by Assistance  2. Sit to stand transfer with Supervision  3. Bed to chair transfer with Supervision using Rolling Walker  4. Gait  x 75 feet with Supervision using Rolling Walker.                       Time Tracking:     PT Received On: 20  PT Start Time: 1051     PT Stop Time: 1115  PT Total Time (min): 24 min     Billable Minutes: Gait Training 13 and Therapeutic Activity 9    Treatment Type: Treatment  PT/PTA: PT     PTA Visit Number: 0     Chapis Clifton, PT  2020

## 2020-05-22 NOTE — NURSING
Pt stable. VSS WDL. Pt had pain, but pain med was administered.   Pt's dressing changed. Wick pulled 1cm. Pt tolerated it well. Teaching provided. Pt verbalized understanding.  Pt DCed home. Instructions given. Pt wheeled outside. Her daughter brought her home.

## 2020-05-22 NOTE — PROGRESS NOTES
[facility]  General Surgery  Progress Note    Subjective:     History of Present Illness:  No notes on file    Post-Op Info:  Procedure(s) (LRB):  CLOSURE, ILEOSTOMY (N/A)   4 Days Post-Op     Interval History:   Tolerating regular diet  No nausea or vomiting  Having bowel movements  Has not ambulated much, just stood in the room    Medications:  Continuous Infusions:  Scheduled Meds:   cholestyramine-aspartame  4 g Oral BID    enoxparin  40 mg Subcutaneous Q24H    Lactobacillus acidoph-L.bulgar  4 tablet Oral TID WM    levothyroxine  88 mcg Oral Before breakfast    pantoprazole  40 mg Oral Daily     PRN Meds:sodium chloride, acetaminophen, dextrose 50%, dextrose 50%, glucagon (human recombinant), glucose, glucose, HYDROmorphone, insulin aspart U-100, LORazepam, ondansetron, oxyCODONE-acetaminophen, sodium chloride, sodium chloride 0.9%, sodium chloride 0.9%     Review of patient's allergies indicates:   Allergen Reactions    Ciprofloxacin Other (See Comments)     Elevated liver enzymes      Pcn [penicillins] Anaphylaxis     Objective:     Vital Signs (Most Recent):  Temp: 98.4 °F (36.9 °C) (05/22/20 0824)  Pulse: 78 (05/22/20 0824)  Resp: 18 (05/22/20 0824)  BP: (!) 162/78 (05/22/20 0824)  SpO2: 96 % (05/22/20 0824) Vital Signs (24h Range):  Temp:  [98.2 °F (36.8 °C)-98.9 °F (37.2 °C)] 98.4 °F (36.9 °C)  Pulse:  [68-84] 78  Resp:  [16-18] 18  SpO2:  [93 %-98 %] 96 %  BP: (144-162)/(78-82) 162/78     Weight: 65.1 kg (143 lb 8.3 oz)  Body mass index is 22.48 kg/m².    Intake/Output - Last 3 Shifts       05/20 0700 - 05/21 0659 05/21 0700 - 05/22 0659 05/22 0700 - 05/23 0659    P.O.  920     I.V. (mL/kg) 750 (11.5)      Blood       IV Piggyback       Total Intake(mL/kg) 750 (11.5) 920 (14.1)     Urine (mL/kg/hr) 1700 (1.1) 2650 (1.7)     Stool 0 0     Total Output 1700 2650     Net -950 -1730            Stool Occurrence 2 x 8 x           Physical Exam   Constitutional: She is oriented to person,  place, and time. She is cooperative. No distress.   Abdominal: Soft. She exhibits no distension. There is no tenderness. There is no rebound and no guarding.   Neurological: She is oriented to person, place, and time.   Skin:   Incisions are clean, dry and intact  There is no evidence of infection, hematoma or seroma        Significant Labs:  CBC:   Recent Labs   Lab 05/22/20  0150   WBC 4.78   RBC 2.84*   HGB 8.7*   HCT 26.5*      MCV 93   MCH 30.6   MCHC 32.8     BMP:   Recent Labs   Lab 05/22/20  0150   GLU 86      K 3.3*      CO2 23   BUN 19   CREATININE 1.5*   CALCIUM 8.4*   MG 1.3*         Assessment/Plan:     * Status post reversal of ileostomy  Doing well from surgical standpoint  Pull wick daily until removed completely    Okay to discharge from surgery standpoint  May need transition to lower level of care prior to home  Will leave this to the hospitalist    F/U with Dr. Lovell in 1 week        Melani Ruff MD  General Surgery  [facility]

## 2020-05-22 NOTE — SUBJECTIVE & OBJECTIVE
Interval History:   Tolerating regular diet  No nausea or vomiting  Having bowel movements  Has not ambulated much, just stood in the room    Medications:  Continuous Infusions:  Scheduled Meds:   cholestyramine-aspartame  4 g Oral BID    enoxparin  40 mg Subcutaneous Q24H    Lactobacillus acidoph-L.bulgar  4 tablet Oral TID WM    levothyroxine  88 mcg Oral Before breakfast    pantoprazole  40 mg Oral Daily     PRN Meds:sodium chloride, acetaminophen, dextrose 50%, dextrose 50%, glucagon (human recombinant), glucose, glucose, HYDROmorphone, insulin aspart U-100, LORazepam, ondansetron, oxyCODONE-acetaminophen, sodium chloride, sodium chloride 0.9%, sodium chloride 0.9%     Review of patient's allergies indicates:   Allergen Reactions    Ciprofloxacin Other (See Comments)     Elevated liver enzymes      Pcn [penicillins] Anaphylaxis     Objective:     Vital Signs (Most Recent):  Temp: 98.4 °F (36.9 °C) (05/22/20 0824)  Pulse: 78 (05/22/20 0824)  Resp: 18 (05/22/20 0824)  BP: (!) 162/78 (05/22/20 0824)  SpO2: 96 % (05/22/20 0824) Vital Signs (24h Range):  Temp:  [98.2 °F (36.8 °C)-98.9 °F (37.2 °C)] 98.4 °F (36.9 °C)  Pulse:  [68-84] 78  Resp:  [16-18] 18  SpO2:  [93 %-98 %] 96 %  BP: (144-162)/(78-82) 162/78     Weight: 65.1 kg (143 lb 8.3 oz)  Body mass index is 22.48 kg/m².    Intake/Output - Last 3 Shifts       05/20 0700 - 05/21 0659 05/21 0700 - 05/22 0659 05/22 0700 - 05/23 0659    P.O.  920     I.V. (mL/kg) 750 (11.5)      Blood       IV Piggyback       Total Intake(mL/kg) 750 (11.5) 920 (14.1)     Urine (mL/kg/hr) 1700 (1.1) 2650 (1.7)     Stool 0 0     Total Output 1700 2650     Net -950 -1730            Stool Occurrence 2 x 8 x           Physical Exam   Constitutional: She is oriented to person, place, and time. She is cooperative. No distress.   Abdominal: Soft. She exhibits no distension. There is no tenderness. There is no rebound and no guarding.   Neurological: She is oriented to person, place,  and time.   Skin:   Incisions are clean, dry and intact  There is no evidence of infection, hematoma or seroma        Significant Labs:  CBC:   Recent Labs   Lab 05/22/20  0150   WBC 4.78   RBC 2.84*   HGB 8.7*   HCT 26.5*      MCV 93   MCH 30.6   MCHC 32.8     BMP:   Recent Labs   Lab 05/22/20  0150   GLU 86      K 3.3*      CO2 23   BUN 19   CREATININE 1.5*   CALCIUM 8.4*   MG 1.3*

## 2020-05-22 NOTE — PLAN OF CARE
"   05/22/20 160   Discharge Reassessment   Assessment Type Discharge Planning Reassessment   Anticipated Discharge Disposition Home-Health   Discharge Plan A Home Health   Discharge Plan B Home Health   DME Needed Upon Discharge  3-in-1 commode;walker, rolling;bath bench   Post-Acute Status   Post-Acute Authorization Home Health;HME   HME Status Referrals Sent   Home Health Status Set-up Complete     Orders received for home health and DME. Met with patient at bedside. Pt states will be going home with her daughter. Had home health care after stay at [facility] and thinks it was [facility]. Call placed to [facility] and patient has been on service with them, referral sent and able to accept patient. Patient states has a walker and does not want the transfer tub bench but will accept the 3 in 1 commode. CM following     Patient's daughter presented to bedside and upset voicing "told was going to stay here for a week and then to rehab. She is going to be home alone and I will not be available to care for her". Secure chat sent to MD informing of above. Awaiting response. Discharge orders have been written and patient given IMM. Awaiting additional information from MD.     Discharge cancelled per MD. CM following for further recommendations. Pt and daughter understands possible discharge tomorrow, aware [facility] has accepted, and follow up on DME tomorrow. Pt and daughter desires to go home if feels stronger as patient has been in hospital/LTAC/hospital since February.   "

## 2020-05-22 NOTE — ASSESSMENT & PLAN NOTE
Doing well from surgical standpoint  Pull wick daily until removed completely    Okay to discharge from surgery standpoint  May need transition to lower level of care prior to home  Will leave this to the hospitalist    F/U with Dr. Lovell in 1 week

## 2020-05-22 NOTE — PLAN OF CARE
Problem: Wound  Goal: Optimal Wound Healing  Outcome: Ongoing, Progressing     Problem: Adult Inpatient Plan of Care  Goal: Plan of Care Review  Outcome: Ongoing, Progressing     Problem: Skin Injury Risk Increased  Goal: Skin Health and Integrity  Outcome: Ongoing, Progressing

## 2020-05-22 NOTE — SUBJECTIVE & OBJECTIVE
Interval History:     Review of Systems   Constitutional: Negative for activity change and appetite change.   HENT: Negative for congestion and dental problem.    Eyes: Negative for discharge and itching.   Respiratory: Negative for shortness of breath.    Cardiovascular: Negative for chest pain.   Gastrointestinal: Positive for abdominal pain and diarrhea. Negative for abdominal distention.   Endocrine: Negative for cold intolerance.   Genitourinary: Positive for hematuria. Negative for difficulty urinating and dysuria.   Musculoskeletal: Negative for arthralgias and back pain.   Skin: Negative for color change.   Neurological: Negative for dizziness and facial asymmetry.   Hematological: Negative for adenopathy.   Psychiatric/Behavioral: Negative for agitation and behavioral problems.     Objective:     Vital Signs (Most Recent):  Temp: 98.9 °F (37.2 °C) (05/21/20 1952)  Pulse: 76 (05/21/20 1952)  Resp: 18 (05/21/20 1952)  BP: (!) 144/81 (05/21/20 1952)  SpO2: (!) 93 % (05/21/20 1952) Vital Signs (24h Range):  Temp:  [98.2 °F (36.8 °C)-98.9 °F (37.2 °C)] 98.9 °F (37.2 °C)  Pulse:  [68-84] 76  Resp:  [18] 18  SpO2:  [93 %-98 %] 93 %  BP: (144-156)/(75-82) 144/81     Weight: 65.1 kg (143 lb 8.3 oz)  Body mass index is 22.48 kg/m².    Intake/Output Summary (Last 24 hours) at 5/21/2020 2053  Last data filed at 5/21/2020 1524  Gross per 24 hour   Intake 1430 ml   Output 1900 ml   Net -470 ml      Physical Exam   Constitutional: She is oriented to person, place, and time. No distress.   HENT:   Right Ear: External ear normal.   Left Ear: External ear normal.   Eyes: Pupils are equal, round, and reactive to light. EOM are normal.   Neck: Neck supple.   Cardiovascular: Normal rate.   Pulmonary/Chest: Effort normal and breath sounds normal.   Abdominal: Soft. Bowel sounds are normal.   Musculoskeletal: Normal range of motion. She exhibits no edema.   Neurological: She is alert and oriented to person, place, and time.    Skin: Skin is warm.   Psychiatric: She has a normal mood and affect.   Nursing note and vitals reviewed.      Significant Labs:   CBC:   Recent Labs   Lab 05/20/20  0445 05/21/20  0400   WBC 6.38 5.95   HGB 8.6* 8.6*   HCT 26.6* 26.1*   * 165     CMP:   Recent Labs   Lab 05/20/20  0445 05/21/20  0400    136   K 5.3* 4.1    105   CO2 22* 21*   GLU 86 86   BUN 21 23   CREATININE 2.0* 1.8*   CALCIUM 8.8 8.7   PROT 5.6* 5.5*   ALBUMIN 2.9* 2.9*   BILITOT 1.4* 1.2*   ALKPHOS 168* 149*   AST 30 19   ALT 20 17   ANIONGAP 6* 10   EGFRNONAA 24.8* 28.1*

## 2020-05-22 NOTE — PROGRESS NOTES
[facility] Medicine  Progress Note    Patient Name: Shara Hutchins  MRN: [id number]  Patient Class: IP- Inpatient   Admission Date: 5/18/2020  Length of Stay: 3 days  Attending Physician: William Ambrocio MD  Primary Care Provider: April Horton MD        Subjective:     Principal Problem:Status post reversal of ileostomy        HPI:  70-year-old female with history of hypertension, diabetes, chronic kidney disease stage 5, anemia of chronic disease, irritable bowel disease, hypothyroidism, vitamin-D deficiency, B12 deficiency, pulmonary hypertension, and vulvar cancer status post radiation with prolonged hospitalization for proctitis and pneumatosis of right colon status post exploratory laparotomy with lysis of adhesions and loop ileostomy placement on February 13th.  Subsequently she had extremely high output from the ileostomy causing dehydration issues.  She has been at an LTAC receiving IV fluids to keep up with fluid demands.  She was originally on TPN after the surgery but has been weaned off.  Today the patient was discharged from LTAC and presented to the hospital for ileostomy reversal. Subsequently I was asked to admit the patient to the hospital for postoperative care and management.     Today the patient reports postoperative pain of abdomen, constant timing, currently mild intensity and controlled with narcotic medications.  She reports no nausea or vomiting.  No chest pain or shortness of breath.  No fever or chills.  She reports severe diarrhea recently with multiple episodes of watery diarrhea per day.  No bleeding, melena hematochezia, or hematemesis.  No headache, dysuria, or cough.    Overview/Hospital Course:  05/19  Had 2 UpRBC for anemia  Pt on clears  Slightly sleepy but not confused    05/20  Pt refused PT/OT  She still has some diarrhhoea    05/21  Pt still having Diarrhhoea  She gives h/o passing clots in foleys bag  Some abdominal pain persists    Interval History:      Review of Systems   Constitutional: Negative for activity change and appetite change.   HENT: Negative for congestion and dental problem.    Eyes: Negative for discharge and itching.   Respiratory: Negative for shortness of breath.    Cardiovascular: Negative for chest pain.   Gastrointestinal: Positive for abdominal pain and diarrhea. Negative for abdominal distention.   Endocrine: Negative for cold intolerance.   Genitourinary: Positive for hematuria. Negative for difficulty urinating and dysuria.   Musculoskeletal: Negative for arthralgias and back pain.   Skin: Negative for color change.   Neurological: Negative for dizziness and facial asymmetry.   Hematological: Negative for adenopathy.   Psychiatric/Behavioral: Negative for agitation and behavioral problems.     Objective:     Vital Signs (Most Recent):  Temp: 98.9 °F (37.2 °C) (05/21/20 1952)  Pulse: 76 (05/21/20 1952)  Resp: 18 (05/21/20 1952)  BP: (!) 144/81 (05/21/20 1952)  SpO2: (!) 93 % (05/21/20 1952) Vital Signs (24h Range):  Temp:  [98.2 °F (36.8 °C)-98.9 °F (37.2 °C)] 98.9 °F (37.2 °C)  Pulse:  [68-84] 76  Resp:  [18] 18  SpO2:  [93 %-98 %] 93 %  BP: (144-156)/(75-82) 144/81     Weight: 65.1 kg (143 lb 8.3 oz)  Body mass index is 22.48 kg/m².    Intake/Output Summary (Last 24 hours) at 5/21/2020 2053  Last data filed at 5/21/2020 1524  Gross per 24 hour   Intake 1430 ml   Output 1900 ml   Net -470 ml      Physical Exam   Constitutional: She is oriented to person, place, and time. No distress.   HENT:   Right Ear: External ear normal.   Left Ear: External ear normal.   Eyes: Pupils are equal, round, and reactive to light. EOM are normal.   Neck: Neck supple.   Cardiovascular: Normal rate.   Pulmonary/Chest: Effort normal and breath sounds normal.   Abdominal: Soft. Bowel sounds are normal.   Musculoskeletal: Normal range of motion. She exhibits no edema.   Neurological: She is alert and oriented to person, place, and time.   Skin: Skin is warm.    Psychiatric: She has a normal mood and affect.   Nursing note and vitals reviewed.      Significant Labs:   CBC:   Recent Labs   Lab 05/20/20  0445 05/21/20  0400   WBC 6.38 5.95   HGB 8.6* 8.6*   HCT 26.6* 26.1*   * 165     CMP:   Recent Labs   Lab 05/20/20  0445 05/21/20  0400    136   K 5.3* 4.1    105   CO2 22* 21*   GLU 86 86   BUN 21 23   CREATININE 2.0* 1.8*   CALCIUM 8.8 8.7   PROT 5.6* 5.5*   ALBUMIN 2.9* 2.9*   BILITOT 1.4* 1.2*   ALKPHOS 168* 149*   AST 30 19   ALT 20 17   ANIONGAP 6* 10   EGFRNONAA 24.8* 28.1*             Assessment/Plan:      * Status post reversal of ileostomy  POD#3  Doing fine  On Clears  Advanced diet to full liquid diet      Acute blood loss as cause of postoperative anemia  S/p 2 U pRBC on 05/19      Mild protein-energy malnutrition  Advance diet to full liquid  Nutrition Team on Board      Diarrhea  This si a chronic issue for pt  Start Questran/Lactobacillus      Debility  PT/OT recommended HH       S/P ileostomy  H/o  prolonged hospitalization for proctitis and pneumatosis of right colon status post exploratory laparotomy with lysis of adhesions and loop ileostomy placement on February 13th  Following she was placed on LTACH     Hypothyroidism  Continue Present Rx      Hisotry of Vulvar cancer  Stable issue      Stage 5 chronic kidney disease not on chronic dialysis  Crt levels on baseline  Monitor closely renal panels  D/C iv fluids    Iron deficiency anemia due to chronic blood loss  Had pRBC before (this year)  Received 2 UpRBC for postoperative anemia(05/19)        VTE Risk Mitigation (From admission, onward)         Ordered     Place JA hose  Until discontinued      05/18/20 1157     Reason for No Pharmacological VTE Prophylaxis  Once     Question:  Reasons:  Answer:  Already adequately anticoagulated on oral Anticoagulants    05/18/20 1157     IP VTE HIGH RISK PATIENT  Once      05/18/20 1157     Place sequential compression device  Until  discontinued      05/18/20 1157                      William Ambrocio MD  Department of Hospital Medicine   [facility]

## 2020-05-22 NOTE — PLAN OF CARE
Problem: Physical Therapy Goal  Goal: Physical Therapy Goal  Description  Goals to be met by: discharge     Patient will increase functional independence with mobility by performin. Supine to sit with Stand-by Assistance  2. Sit to stand transfer with Supervision  3. Bed to chair transfer with Supervision using Rolling Walker  4. Gait  x 75 feet with Supervision using Rolling Walker.      Outcome: Ongoing, Progressing

## 2020-05-22 NOTE — PROGRESS NOTES
[facility]  General Surgery  Progress Note    Subjective:     Interval History: Started passing flatus overngiht. She has had multiple loose stools. Pain is improved. She stated passing     Post-Op Info:  Procedure(s) (LRB):  CLOSURE, ILEOSTOMY (N/A)   3 Days Post-Op      Medications:  Continuous Infusions:  Scheduled Meds:   cholestyramine-aspartame  4 g Oral BID    Lactobacillus acidoph-L.bulgar  4 tablet Oral TID WM    pantoprazole  40 mg Oral Daily     PRN Meds:sodium chloride, acetaminophen, dextrose 50%, dextrose 50%, glucagon (human recombinant), glucose, glucose, HYDROmorphone, insulin aspart U-100, ondansetron, sodium chloride, sodium chloride 0.9%, sodium chloride 0.9%     Objective:     Vital Signs (Most Recent):  Temp: 98.9 °F (37.2 °C) (05/21/20 1952)  Pulse: 76 (05/21/20 1952)  Resp: 18 (05/21/20 1952)  BP: (!) 144/81 (05/21/20 1952)  SpO2: (!) 93 % (05/21/20 1952) Vital Signs (24h Range):  Temp:  [98.2 °F (36.8 °C)-98.9 °F (37.2 °C)] 98.9 °F (37.2 °C)  Pulse:  [68-84] 76  Resp:  [18] 18  SpO2:  [93 %-98 %] 93 %  BP: (144-156)/(75-82) 144/81       Intake/Output Summary (Last 24 hours) at 5/21/2020 2157  Last data filed at 5/21/2020 2100  Gross per 24 hour   Intake 1430 ml   Output 2350 ml   Net -920 ml       Physical Exam   Constitutional: She is oriented to person, place, and time. No distress.   Pulmonary/Chest: Effort normal. No respiratory distress.   Abdominal: She exhibits no distension. There is tenderness. There is no rebound and no guarding.   Neurological: She is oriented to person, place, and time.       Significant Labs:  CBC:   Recent Labs   Lab 05/21/20  0400   WBC 5.95   RBC 2.78*   HGB 8.6*   HCT 26.1*      MCV 94   MCH 30.9   MCHC 33.0     CMP:   Recent Labs   Lab 05/21/20  0400   GLU 86   CALCIUM 8.7   ALBUMIN 2.9*   PROT 5.5*      K 4.1   CO2 21*      BUN 23   CREATININE 1.8*   ALKPHOS 149*   ALT 17   AST 19   BILITOT 1.2*         Assessment/Plan:      Active Diagnoses:    Diagnosis Date Noted POA    PRINCIPAL PROBLEM:  Status post reversal of ileostomy [Z98.890] 05/18/2020 Not Applicable    Diarrhea [R19.7] 05/21/2020 Unknown    Mild protein-energy malnutrition [E44.1] 05/21/2020 Unknown    Acute blood loss as cause of postoperative anemia [D62] 05/19/2020 Unknown    Debility [R53.81] 03/07/2020 Yes    S/P ileostomy [Z93.2] 02/13/2020 Not Applicable    Hypothyroidism [E03.9] 02/10/2020 Yes     Chronic    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] 01/07/2019 Yes     Chronic    Stage 5 chronic kidney disease not on chronic dialysis [N18.5] 11/04/2018 Yes     Chronic    Iron deficiency anemia due to chronic blood loss [D50.0] 01/07/2018 Yes     Chronic      Problems Resolved During this Admission:     -Passing flatus and having loose stool. Will advance diet.  -PT  -IS       Robbi Lovell III, MD  General Surgery  [facility]

## 2020-05-22 NOTE — PROGRESS NOTES
"On license of [facility]  Adult Nutrition   Progress Note (Follow-Up)    SUMMARY     Recommendations  Recommendation/Intervention: 1. Continue diet as tolerated by pt 2.  to assist with meal choices daily 3. Recommend electrolyte replacement as per protocol  Goals: 1. Pt to meet at least 75% estimated needs via PO diet. 2. Electrolytes to trend towards normal limits   Nutrition Goal Status: progressing towards goal    Dietitian Rounds Brief    Pt seen for f/u. Tolerating diet progression. No c/o voiced. Intake fair; ~50% bkfst. Pt c/o headache 2/2 lack of caffeine--daughter brought in a coke. Hypokalemia and hypomagnesemia persists--recommend replacing.     Reason for Assessment  Reason For Assessment: RD follow-up  Diagnosis: surgery/postoperative complications(reversal of ileostomy)  Relevant Medical History: hypertension, diabetes, chronic kidney disease stage 5, anemia of chronic disease, irritable bowel disease, hypothyroidism, vitamin-D deficiency, B12 deficiency, pulmonary hypertension, and vulvar cancer status post radiation with prolonged hospitalization for proctitis and pneumatosis of right colon status post exploratory laparotomy with lysis of adhesions and loop ileostomy placement (2/13/2020)  Interdisciplinary Rounds: attended    Nutrition Risk Screen  Nutrition Risk Screen: no indicators present    Nutrition/Diet History  Spiritual, Cultural Beliefs, Orthodoxy Practices, Values that Affect Care: no  Food Allergies: NKFA  Factors Affecting Nutritional Intake: None identified at this time    Anthropometrics  Temp: 98.4 °F (36.9 °C)  Height Method: Stated  Height: 5' 7" (170.2 cm)  Height (inches): 67 in  Weight Method: Bed Scale  Weight: 65.1 kg (143 lb 8.3 oz)  Weight (lb): 143.52 lb  Ideal Body Weight (IBW), Female: 135 lb  % Ideal Body Weight, Female (lb): 106.31 %  BMI (Calculated): 22.5  BMI Grade: 18.5-24.9 - normal       Weight History:  Wt Readings from Last 10 Encounters:   05/21/20 " 65.1 kg (143 lb 8.3 oz)   05/15/20 56.2 kg (123 lb 14.4 oz)   05/12/20 56.2 kg (124 lb)   03/27/20 56.6 kg (124 lb 12.5 oz)   03/06/20 53 kg (116 lb 13.5 oz)   03/07/20 52.6 kg (116 lb)   03/03/20 53 kg (116 lb 13.5 oz)   02/19/20 58.7 kg (129 lb 4.8 oz)   02/19/20 58.8 kg (129 lb 9.6 oz)   02/14/20 56.7 kg (125 lb)       Lab/Procedures/Meds: Pertinent Labs Reviewed  Clinical Chemistry:  Recent Labs   Lab 05/22/20  0150      K 3.3*      CO2 23   GLU 86   BUN 19   CREATININE 1.5*   CALCIUM 8.4*   PROT 5.1*   ALBUMIN 2.8*   BILITOT 1.2*   ALKPHOS 133   AST 14   ALT 12   ANIONGAP 7*   ESTGFRAFRICA 40.4*   EGFRNONAA 35.0*   MG 1.3*     CBC:   Recent Labs   Lab 05/22/20  0150   WBC 4.78   RBC 2.84*   HGB 8.7*   HCT 26.5*      MCV 93   MCH 30.6   MCHC 32.8     Lipid Panel:  No results for input(s): CHOL, HDL, LDLCALC, TRIG, CHOLHDL in the last 168 hours.  Cardiac Profile:  No results for input(s): BNP, CPK, CPKMB, TROPONINI, CKTOTAL in the last 168 hours.  Inflammatory Labs:  No results for input(s): CRP in the last 168 hours.  Diabetes:  No results for input(s): HGBA1C, POCTGLUCOSE in the last 168 hours.  Thyroid & Parathyroid:  No results for input(s): TSH, FREET4, F7PNFIC, W9KNSTJ, THYROIDAB in the last 168 hours.  Vitamins:  No results for input(s): DKGFYNWX21, [id number] in the last 168 hours.    Medications: Pertinent Medications reviewed  Scheduled Meds:   cholestyramine-aspartame  4 g Oral BID    enoxparin  40 mg Subcutaneous Q24H    Lactobacillus acidoph-L.bulgar  4 tablet Oral TID WM    levothyroxine  88 mcg Oral Before breakfast    pantoprazole  40 mg Oral Daily     Continuous Infusions:  PRN Meds:.sodium chloride, acetaminophen, dextrose 50%, dextrose 50%, glucagon (human recombinant), glucose, glucose, HYDROmorphone, insulin aspart U-100, LORazepam, ondansetron, oxyCODONE-acetaminophen, sodium chloride, sodium chloride 0.9%, sodium chloride 0.9%    Estimated/Assessed Needs  Weight  Used For Calorie Calculations: 65.1 kg (143 lb 8.3 oz)  Energy Calorie Requirements (kcal): 8002-6878 (25-30 kcal/kg)  Energy Need Method: Kcal/kg  Protein Requirements: 65-78 g (1-1.2 g/kg)  Weight Used For Protein Calculations: 65.1 kg (143 lb 8.3 oz)     Estimated Fluid Requirement Method: RDA Method  RDA Method (mL): 1627       Nutrition Prescription Ordered  Current Diet Order: bland    Evaluation of Received Nutrient/Fluid Intake  IV Fluid (mL): 2400  Energy Calories Required: not meeting needs  Protein Required: not meeting needs  Fluid Required: meeting needs  Tolerance: tolerating     Intake/Output Summary (Last 24 hours) at 5/22/2020 1207  Last data filed at 5/22/2020 0600  Gross per 24 hour   Intake 920 ml   Output 2650 ml   Net -1730 ml        % Meal Intake: 50 - 75 %    Nutrition Risk  Level of Risk/Frequency of Follow-up: moderate - high     Monitor and Evaluation  Food and Nutrient Intake: food and beverage intake, energy intake  Food and Nutrient Adminstration: diet order  Physical Activity and Function: nutrition-related ADLs and IADLs  Anthropometric Measurements: weight, weight change  Biochemical Data, Medical Tests and Procedures: glucose/endocrine profile, electrolyte and renal panel, gastrointestinal profile  Nutrition-Focused Physical Findings: overall appearance     Nutrition Follow-Up  RD Follow-up?: Yes    Altagracia Mcdonough RD 05/22/2020 12:07 PM

## 2020-05-23 VITALS
WEIGHT: 143.5 LBS | HEIGHT: 67 IN | RESPIRATION RATE: 17 BRPM | DIASTOLIC BLOOD PRESSURE: 83 MMHG | OXYGEN SATURATION: 95 % | HEART RATE: 70 BPM | SYSTOLIC BLOOD PRESSURE: 161 MMHG | TEMPERATURE: 98 F | BODY MASS INDEX: 22.52 KG/M2

## 2020-05-23 PROBLEM — E87.6 HYPOKALEMIA: Status: RESOLVED | Noted: 2020-05-23 | Resolved: 2020-05-23

## 2020-05-23 PROBLEM — E87.6 HYPOKALEMIA: Status: ACTIVE | Noted: 2020-05-23

## 2020-05-23 LAB
ALBUMIN SERPL BCP-MCNC: 2.7 G/DL (ref 3.5–5.2)
ALP SERPL-CCNC: 125 U/L (ref 55–135)
ALT SERPL W/O P-5'-P-CCNC: 11 U/L (ref 10–44)
ANION GAP SERPL CALC-SCNC: 7 MMOL/L (ref 8–16)
AST SERPL-CCNC: 13 U/L (ref 10–40)
BASOPHILS # BLD AUTO: 0.01 K/UL (ref 0–0.2)
BASOPHILS NFR BLD: 0.2 % (ref 0–1.9)
BILIRUB SERPL-MCNC: 0.9 MG/DL (ref 0.1–1)
BUN SERPL-MCNC: 17 MG/DL (ref 8–23)
CALCIUM SERPL-MCNC: 8.2 MG/DL (ref 8.7–10.5)
CHLORIDE SERPL-SCNC: 106 MMOL/L (ref 95–110)
CO2 SERPL-SCNC: 23 MMOL/L (ref 23–29)
CREAT SERPL-MCNC: 1.5 MG/DL (ref 0.5–1.4)
DIFFERENTIAL METHOD: ABNORMAL
EOSINOPHIL # BLD AUTO: 0.4 K/UL (ref 0–0.5)
EOSINOPHIL NFR BLD: 7.6 % (ref 0–8)
ERYTHROCYTE [DISTWIDTH] IN BLOOD BY AUTOMATED COUNT: 16.9 % (ref 11.5–14.5)
EST. GFR  (AFRICAN AMERICAN): 40.4 ML/MIN/1.73 M^2
EST. GFR  (NON AFRICAN AMERICAN): 35 ML/MIN/1.73 M^2
GLUCOSE SERPL-MCNC: 81 MG/DL (ref 70–110)
HCT VFR BLD AUTO: 26.9 % (ref 37–48.5)
HGB BLD-MCNC: 9.2 G/DL (ref 12–16)
IMM GRANULOCYTES # BLD AUTO: 0.02 K/UL (ref 0–0.04)
IMM GRANULOCYTES NFR BLD AUTO: 0.4 % (ref 0–0.5)
LYMPHOCYTES # BLD AUTO: 1 K/UL (ref 1–4.8)
LYMPHOCYTES NFR BLD: 21.7 % (ref 18–48)
MAGNESIUM SERPL-MCNC: 1.3 MG/DL (ref 1.6–2.6)
MCH RBC QN AUTO: 31.3 PG (ref 27–31)
MCHC RBC AUTO-ENTMCNC: 34.2 G/DL (ref 32–36)
MCV RBC AUTO: 92 FL (ref 82–98)
MONOCYTES # BLD AUTO: 0.5 K/UL (ref 0.3–1)
MONOCYTES NFR BLD: 10.3 % (ref 4–15)
NEUTROPHILS # BLD AUTO: 2.8 K/UL (ref 1.8–7.7)
NEUTROPHILS NFR BLD: 59.8 % (ref 38–73)
NRBC BLD-RTO: 0 /100 WBC
PLATELET # BLD AUTO: 187 K/UL (ref 150–350)
PMV BLD AUTO: 10.3 FL (ref 9.2–12.9)
POTASSIUM SERPL-SCNC: 2.8 MMOL/L (ref 3.5–5.1)
PROT SERPL-MCNC: 5.1 G/DL (ref 6–8.4)
RBC # BLD AUTO: 2.94 M/UL (ref 4–5.4)
SODIUM SERPL-SCNC: 136 MMOL/L (ref 136–145)
WBC # BLD AUTO: 4.75 K/UL (ref 3.9–12.7)

## 2020-05-23 PROCEDURE — 25000003 PHARM REV CODE 250: Performed by: INTERNAL MEDICINE

## 2020-05-23 PROCEDURE — 63600175 PHARM REV CODE 636 W HCPCS: Performed by: INTERNAL MEDICINE

## 2020-05-23 PROCEDURE — 25000003 PHARM REV CODE 250: Performed by: FAMILY MEDICINE

## 2020-05-23 PROCEDURE — 94761 N-INVAS EAR/PLS OXIMETRY MLT: CPT

## 2020-05-23 PROCEDURE — 85025 COMPLETE CBC W/AUTO DIFF WBC: CPT

## 2020-05-23 PROCEDURE — 83735 ASSAY OF MAGNESIUM: CPT

## 2020-05-23 PROCEDURE — 80053 COMPREHEN METABOLIC PANEL: CPT

## 2020-05-23 RX ORDER — POTASSIUM CHLORIDE 20 MEQ/1
40 TABLET, EXTENDED RELEASE ORAL ONCE
Status: COMPLETED | OUTPATIENT
Start: 2020-05-23 | End: 2020-05-23

## 2020-05-23 RX ORDER — MIRTAZAPINE 15 MG/1
15 TABLET, FILM COATED ORAL NIGHTLY
Qty: 30 TABLET | Refills: 11 | Status: SHIPPED | OUTPATIENT
Start: 2020-05-23 | End: 2020-07-14

## 2020-05-23 RX ORDER — AMLODIPINE BESYLATE 5 MG/1
5 TABLET ORAL DAILY
Status: DISCONTINUED | OUTPATIENT
Start: 2020-05-23 | End: 2020-05-23 | Stop reason: HOSPADM

## 2020-05-23 RX ADMIN — POTASSIUM CHLORIDE 40 MEQ: 20 TABLET, EXTENDED RELEASE ORAL at 01:05

## 2020-05-23 RX ADMIN — AMLODIPINE BESYLATE 5 MG: 5 TABLET ORAL at 10:05

## 2020-05-23 RX ADMIN — ENOXAPARIN SODIUM 40 MG: 100 INJECTION SUBCUTANEOUS at 07:05

## 2020-05-23 RX ADMIN — LACTOBACILLUS TAB 4 TABLET: TAB at 07:05

## 2020-05-23 RX ADMIN — POTASSIUM CHLORIDE 40 MEQ: 20 TABLET, EXTENDED RELEASE ORAL at 07:05

## 2020-05-23 RX ADMIN — PANTOPRAZOLE SODIUM 40 MG: 40 TABLET, DELAYED RELEASE ORAL at 07:05

## 2020-05-23 RX ADMIN — OXYCODONE HYDROCHLORIDE AND ACETAMINOPHEN 1 TABLET: 5; 325 TABLET ORAL at 03:05

## 2020-05-23 RX ADMIN — LACTOBACILLUS TAB 4 TABLET: TAB at 10:05

## 2020-05-23 RX ADMIN — OXYCODONE HYDROCHLORIDE AND ACETAMINOPHEN 1 TABLET: 5; 325 TABLET ORAL at 10:05

## 2020-05-23 RX ADMIN — CHOLESTYRAMINE 4 G: 4 POWDER, FOR SUSPENSION ORAL at 07:05

## 2020-05-23 RX ADMIN — LEVOTHYROXINE SODIUM 88 MCG: 88 TABLET ORAL at 06:05

## 2020-05-23 NOTE — PROGRESS NOTES
[facility] Medicine  Progress Note    Patient Name: Shara Hutchins  MRN: [id number]  Patient Class: IP- Inpatient   Admission Date: 5/18/2020  Length of Stay: 4 days  Attending Physician: William Ambrocio MD  Primary Care Provider: April Horton MD        Subjective:     Principal Problem:Status post reversal of ileostomy        HPI:  70-year-old female with history of hypertension, diabetes, chronic kidney disease stage 5, anemia of chronic disease, irritable bowel disease, hypothyroidism, vitamin-D deficiency, B12 deficiency, pulmonary hypertension, and vulvar cancer status post radiation with prolonged hospitalization for proctitis and pneumatosis of right colon status post exploratory laparotomy with lysis of adhesions and loop ileostomy placement on February 13th.  Subsequently she had extremely high output from the ileostomy causing dehydration issues.  She has been at an LTAC receiving IV fluids to keep up with fluid demands.  She was originally on TPN after the surgery but has been weaned off.  Today the patient was discharged from LTAC and presented to the hospital for ileostomy reversal. Subsequently I was asked to admit the patient to the hospital for postoperative care and management.     Today the patient reports postoperative pain of abdomen, constant timing, currently mild intensity and controlled with narcotic medications.  She reports no nausea or vomiting.  No chest pain or shortness of breath.  No fever or chills.  She reports severe diarrhea recently with multiple episodes of watery diarrhea per day.  No bleeding, melena hematochezia, or hematemesis.  No headache, dysuria, or cough.    Overview/Hospital Course:  05/19  Had 2 UpRBC for anemia  Pt on clears  Slightly sleepy but not confused    05/20  Pt refused PT/OT  She still has some diarrhhoea    05/21  Pt still having Diarrhhoea  She gives h/o passing clots in foleys bag  Some abdominal pain persists    05/21  Patient  Tolerating diet  Cleared by me and Surgical Team to go home but dont want to go because PT/OT team told her she is not ready to go home !  Bath tub bench ordered per OT recommendation and pt doesn't think she need it     Interval History:    Review of Systems   Constitutional: Negative for activity change and appetite change.   HENT: Negative for congestion and dental problem.    Eyes: Negative for discharge and itching.   Respiratory: Negative for shortness of breath.    Cardiovascular: Negative for chest pain.   Gastrointestinal: Negative for abdominal distention and abdominal pain.   Endocrine: Negative for cold intolerance.   Genitourinary: Negative for difficulty urinating and dysuria.   Musculoskeletal: Negative for arthralgias and back pain.   Skin: Negative for color change.   Neurological: Negative for dizziness and facial asymmetry.   Hematological: Negative for adenopathy.   Psychiatric/Behavioral: Negative for agitation and behavioral problems.     Objective:     Vital Signs (Most Recent):  Temp: 99.2 °F (37.3 °C) (05/22/20 1928)  Pulse: 75 (05/22/20 1928)  Resp: 18 (05/22/20 1928)  BP: (!) 164/81 (05/22/20 1928)  SpO2: (!) 94 % (05/22/20 1928) Vital Signs (24h Range):  Temp:  [98.2 °F (36.8 °C)-99.2 °F (37.3 °C)] 99.2 °F (37.3 °C)  Pulse:  [75-79] 75  Resp:  [16-19] 18  SpO2:  [94 %-97 %] 94 %  BP: (144-164)/(71-81) 164/81     Weight: 65.1 kg (143 lb 8.3 oz)  Body mass index is 22.48 kg/m².    Intake/Output Summary (Last 24 hours) at 5/22/2020 2046  Last data filed at 5/22/2020 0600  Gross per 24 hour   Intake 240 ml   Output 1550 ml   Net -1310 ml      Physical Exam   Constitutional: She is oriented to person, place, and time. No distress.   HENT:   Right Ear: External ear normal.   Left Ear: External ear normal.   Eyes: Pupils are equal, round, and reactive to light. EOM are normal.   Neck: Neck supple.   Cardiovascular: Normal rate.   Pulmonary/Chest: Effort normal and breath sounds normal.    Abdominal: Soft. Bowel sounds are normal.   Musculoskeletal: Normal range of motion. She exhibits no edema.   Neurological: She is alert and oriented to person, place, and time.   Skin: Skin is warm.   Psychiatric: She has a normal mood and affect.   Nursing note and vitals reviewed.      Significant Labs:   CBC:   Recent Labs   Lab 05/21/20  0400 05/22/20  0150   WBC 5.95 4.78   HGB 8.6* 8.7*   HCT 26.1* 26.5*    173     CMP:   Recent Labs   Lab 05/21/20  0400 05/22/20  0150    136   K 4.1 3.3*    106   CO2 21* 23   GLU 86 86   BUN 23 19   CREATININE 1.8* 1.5*   CALCIUM 8.7 8.4*   PROT 5.5* 5.1*   ALBUMIN 2.9* 2.8*   BILITOT 1.2* 1.2*   ALKPHOS 149* 133   AST 19 14   ALT 17 12   ANIONGAP 10 7*   EGFRNONAA 28.1* 35.0*             Assessment/Plan:      * Status post reversal of ileostomy  POD#4  Doing fine  Tolerating bland diet    Cleared by me and Surgical Team to go home but dont want to go because PT/OT team told her she is not ready to go home !  Pt  told me she need to stay whole weekend as instructed by PT/OT  Plan was to go home with  PT/OT once medically stable and I dont understand what difference it will make if she stays in hospital over weekend  Bath tub bench ordered per OT recommendation and pt doesn't think she need it (Per SW/CM)    Later I discharged Pt  Patients  Daughter came to hospital and refused to get discharged  Per Them:They were  told initially by the admitting surgeon that pt will be here for weeks and now want admitting surgeon on board  Also they want to go to Rehab !!!  (Pt was in LTACH from 03/27 till she got admitted in this hospital this week: Approximately 2 months)  I will sign off and handover care to different MD       Mild protein-energy malnutrition  Tolerating bland diet      S/P ileostomy  H/o  prolonged hospitalization for proctitis and pneumatosis of right colon status post exploratory laparotomy with lysis of adhesions and loop ileostomy placement  on February 13th  Following she was placed on LTACH for 2 months    Hypothyroidism  Continue Present Rx      Hisotry of Vulvar cancer  Stable issue        VTE Risk Mitigation (From admission, onward)         Ordered     enoxaparin injection 40 mg  Every 24 hours (non-standard times)      05/22/20 0834     Place JA hose  Until discontinued      05/18/20 1157     Reason for No Pharmacological VTE Prophylaxis  Once     Question:  Reasons:  Answer:  Already adequately anticoagulated on oral Anticoagulants    05/18/20 1157     IP VTE HIGH RISK PATIENT  Once      05/18/20 1157     Place sequential compression device  Until discontinued      05/18/20 1157                      William Ambrocio MD  Department of Hospital Medicine   [facility]

## 2020-05-23 NOTE — NURSING
Pt had some excoriation (since her radiation, it was not acquired here). They were in the avatar w/ their dates (long time ago).    I PUT CALMOSEPTINE, CLEANED, ETC.

## 2020-05-23 NOTE — DISCHARGE SUMMARY
[facility] Medicine  Discharge Summary      Patient Name: Shara Hutchins  MRN: [id number]  Admission Date: 5/18/2020  Hospital Length of Stay: 5 days  Discharge Date and Time: 5/23/2020  5:37 PM  Attending Physician: No att. providers found   Discharging Provider: Johnathan Carbajal MD  Primary Care Provider: April Horton MD      HPI:   70-year-old female with history of hypertension, diabetes, chronic kidney disease stage 5, anemia of chronic disease, irritable bowel disease, hypothyroidism, vitamin-D deficiency, B12 deficiency, pulmonary hypertension, and vulvar cancer status post radiation with prolonged hospitalization for proctitis and pneumatosis of right colon status post exploratory laparotomy with lysis of adhesions and loop ileostomy placement on February 13th.  Subsequently she had extremely high output from the ileostomy causing dehydration issues.  She has been at an LTAC receiving IV fluids to keep up with fluid demands.  She was originally on TPN after the surgery but has been weaned off.  Today the patient was discharged from LTAC and presented to the hospital for ileostomy reversal. Subsequently I was asked to admit the patient to the hospital for postoperative care and management.     Today the patient reports postoperative pain of abdomen, constant timing, currently mild intensity and controlled with narcotic medications.  She reports no nausea or vomiting.  No chest pain or shortness of breath.  No fever or chills.  She reports severe diarrhea recently with multiple episodes of watery diarrhea per day.  No bleeding, melena hematochezia, or hematemesis.  No headache, dysuria, or cough.    Procedure(s) (LRB):  CLOSURE, ILEOSTOMY (N/A)      Hospital Course:   05/19  Had 2 UpRBC for anemia  Pt on clears  Slightly sleepy but not confused    05/20  Pt refused PT/OT  She still has some diarrhhoea    05/21  Pt still having Diarrhhoea  She gives h/o passing clots in foleys bag  Some  abdominal pain persists    05/21  Patient Tolerating diet  Cleared by me and Surgical Team to go home but dont want to go because PT/OT team told her she is not ready to go home !  Bath tub bench ordered per OT recommendation and pt doesn't think she need it     05/23: I, Dr. Carbajal assumed care of patient today. She was admitted for ileostomy reversal and has been discharged yesterday; however daughter appealed the discharge process and instead wanted her mother to go to rehab. Of note patient has been deemed stable for discharge to home with home therapy by PT/OT. During my encounter today, patient desired to go home with home PT/OT. Provided lab work to check electrolytes and hemoglobin in 4-5 days. Advised to follow-up with Dr. Lovell from general surgery within a week. Return precautions provided.     Instructions provided to follow up with primary care physician as outpatient. Patient verbalized understanding and is aware to contact primary care physician or return to ED if new or worsening symptoms.    Physical exam on the day of discharge:  General: Patient resting comfortably in no acute distress.  Lungs: CTA. Good air entry.  Cor: Regular rate and rhythm. No murmurs. No pedal edema.  Abd: Soft. Normal bowel sounds  Neuro: A&O x3. Moving all 4 extremities equally       Consults:   Consults (From admission, onward)        Status Ordering Provider     Inpatient consult to General Surgery  Once     Provider:  Robbi Lovell III, MD    Acknowledged SONA BAIG     Inpatient consult to   Once     Provider:  (Not yet assigned)    Acknowledged TOMMIE COLLADO          No new Assessment & Plan notes have been filed under this hospital service since the last note was generated.  Service: Hospital Medicine    Final Active Diagnoses:    Diagnosis Date Noted POA    PRINCIPAL PROBLEM:  Status post reversal of ileostomy [Z98.890] 05/18/2020 Not Applicable    Mild protein-energy malnutrition  "[E44.1] 05/21/2020 Yes    S/P ileostomy [Z93.2] 02/13/2020 Not Applicable    Hypothyroidism [E03.9] 02/10/2020 Yes     Chronic    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] 01/07/2019 Yes     Chronic      Problems Resolved During this Admission:    Diagnosis Date Noted Date Resolved POA    Hypokalemia [E87.6] 05/23/2020 05/23/2020 No    Diarrhea [R19.7] 05/21/2020 05/22/2020 Yes    Acute blood loss as cause of postoperative anemia [D62] 05/19/2020 05/22/2020 Yes    Debility [R53.81] 03/07/2020 05/22/2020 Yes    Stage 5 chronic kidney disease not on chronic dialysis [N18.5] 11/04/2018 05/22/2020 Yes     Chronic    Iron deficiency anemia due to chronic blood loss [D50.0] 01/07/2018 05/22/2020 Yes     Chronic       Discharged Condition: fair    Disposition: [facility]-Health Care [facility]    Follow Up:   Contact information for follow-up providers     Robbi Lovell III, MD In 1 week.    Specialties:  General Surgery, Surgery  Contact information:  [address]  [facility] [facility] [address]  [phone]             April Horton MD In 1 week.    Specialty:  Internal Medicine  Why:  Hospital discharge follow-up  Contact information:  [address] [address]  [phone]             Robbi Lovell III, MD. Schedule an appointment as soon as possible for a visit in 1 week.    Specialties:  General Surgery, Surgery  Why:  For wound re-check  Contact information:  [address]  [facility] [address]  [phone]                   Contact information for after-discharge care     Home Medical Care     [facility] [place].    Service:  Home Health Services  Contact information:  [address]  [facility] [address]  [phone]                           Patient Instructions:      3 IN 1 COMMODE FOR HOME USE     Order Specific Question Answer Comments   Type: Standard    Height: 5' 7" (1.702 m)    Weight: 65.1 kg (143 lb 8.3 oz)    Does patient have medical equipment at home? " "rollator    Length of need (1-99 months): 99      TRANSFER TUB BENCH FOR HOME USE     Order Specific Question Answer Comments   Type of Transfer Tub Bench: Unpadded    Height: 5' 7" (1.702 m)    Weight: 65.1 kg (143 lb 8.3 oz)    Does patient have medical equipment at home? rollator    Length of need (1-99 months): 99      WALKER FOR HOME USE     Order Specific Question Answer Comments   Type of Walker: Rollator    With wheels? Yes    Height: 5' 7" (1.702 m)    Weight: 65.1 kg (143 lb 8.3 oz)    Length of need (1-99 months): 99    Does patient have medical equipment at home? none Pt has not been home in few months but has been using RW at LT and hospital with assistance.    Please check all that apply: Patient's condition impairs ambulation.    Please check all that apply: Patient needs help to get in and out of chair.    Please check all that apply: Patient is unable to safely ambulate without equipment.      Basic metabolic panel   Standing Status: Future Standing Exp. Date: 07/22/21     CBC auto differential   Standing Status: Future Standing Exp. Date: 07/22/21     Diet Adult Regular     Diet Cardiac     Notify your health care provider if you experience any of the following:  temperature >100.4     Notify your health care provider if you experience any of the following:  persistent nausea and vomiting or diarrhea     Notify your health care provider if you experience any of the following:  persistent dizziness, light-headedness, or visual disturbances     Activity as tolerated       Significant Diagnostic Studies: Labs:   CMP   Recent Labs   Lab 05/22/20  0150 05/23/20  0502    136   K 3.3* 2.8*    106   CO2 23 23   GLU 86 81   BUN 19 17   CREATININE 1.5* 1.5*   CALCIUM 8.4* 8.2*   PROT 5.1* 5.1*   ALBUMIN 2.8* 2.7*   BILITOT 1.2* 0.9   ALKPHOS 133 125   AST 14 13   ALT 12 11   ANIONGAP 7* 7*   ESTGFRAFRICA 40.4* 40.4*   EGFRNONAA 35.0* 35.0*    and CBC   Recent Labs   Lab 05/22/20  0150 " 05/23/20  0502   WBC 4.78 4.75   HGB 8.7* 9.2*   HCT 26.5* 26.9*    187       Pending Diagnostic Studies:     None         Medications:  Reconciled Home Medications:      Medication List      START taking these medications    mirtazapine 15 MG tablet  Commonly known as:  REMERON  Take 1 tablet (15 mg total) by mouth every evening.     pantoprazole 40 MG tablet  Commonly known as:  PROTONIX  Take 1 tablet (40 mg total) by mouth once daily. for 21 days  Replaces:  pantoprazole 40 mg injection        CHANGE how you take these medications    LORazepam 0.5 MG tablet  Commonly known as:  ATIVAN  Take 1 tablet (0.5 mg total) by mouth every 12 (twelve) hours as needed for Anxiety.  What changed:    · medication strength  · when to take this  · reasons to take this     oxyCODONE-acetaminophen 5-325 mg per tablet  Commonly known as:  PERCOCET  Take 1 tablet by mouth every 8 (eight) hours as needed for Pain.  What changed:    · when to take this  · reasons to take this        CONTINUE taking these medications    cholestyramine-aspartame 4 gram Pwpk  Commonly known as:  QUESTRAN LIGHT  Take 1 packet (4 g total) by mouth 2 (two) times daily. for 21 days     levothyroxine 88 MCG tablet  Commonly known as:  SYNTHROID  Take 1 tablet (88 mcg total) by mouth before breakfast.        STOP taking these medications    acetaminophen 325 MG tablet  Commonly known as:  TYLENOL     dextrose 50%  injection     diphenhydramine 12.5 mg Inj     dronabinoL 2.5 MG capsule  Commonly known as:  MARINOL     glucose 4 GM chewable tablet     lactated ringers infusion     magnesium oxide 400 mg (241.3 mg magnesium) tablet  Commonly known as:  MAG-OX     melatonin 3 mg tablet  Commonly known as:  MELATIN     ondansetron 8 MG Tbdl  Commonly known as:  ZOFRAN-ODT     pantoprazole 40 mg injection  Commonly known as:  PROTONIX  Replaced by:  pantoprazole 40 MG tablet     psyllium husk (with sugar) 3.4 gram packet     RestoriL 30 mg capsule  Generic  drug:  temazepam            Indwelling Lines/Drains at time of discharge:   Lines/Drains/Airways     Central Venous Catheter Line            Percutaneous Central Line Insertion/Assessment - Double Lumen  right subclavian -- days          Drain                 Hemodialysis AV Fistula Left upper arm -- days         Urethral Catheter 03/16/20 2230 Straight-tip 18 Fr. 67 days                Time spent on the discharge of patient: 35 minutes  Patient was seen and examined on the date of discharge and determined to be suitable for discharge.         Johnathan Carbajal MD  Department of Hospital Medicine  [facility]

## 2020-05-23 NOTE — ASSESSMENT & PLAN NOTE
H/o  prolonged hospitalization for proctitis and pneumatosis of right colon status post exploratory laparotomy with lysis of adhesions and loop ileostomy placement on February 13th  Following she was placed on LTACH for 2 months

## 2020-05-23 NOTE — PLAN OF CARE
05/23/20 1510   Final Note   Assessment Type Final Discharge Note   Anticipated Discharge Disposition Home-Health   cm delivered bs

## 2020-05-23 NOTE — ASSESSMENT & PLAN NOTE
POD#4  Doing fine  Tolerating bland diet    Cleared by me and Surgical Team to go home but dont want to go because PT/OT team told her she is not ready to go home !  Pt  told me she need to stay whole weekend as instructed by PT/OT  Plan was to go home with  PT/OT once medically stable and I dont understand what difference it will make if she stays in hospital over weekend  Bath tub bench ordered per OT recommendation and pt doesn't think she need it (Per SW/CM)    Later I discharged Pt  Patients  Daughter came to hospital and refused to get discharged  Per Them:They were  told initially by the admitting surgeon that pt will be here for weeks and now want admitting surgeon on board  Also they want to go to Rehab !!!  (Pt was in LTACH from 03/27 till she got admitted in this hospital this week: Approximately 2 months)  I will sign off and handover care to different MD

## 2020-05-23 NOTE — RESPIRATORY THERAPY
05/22/20 2044   Patient Assessment/Suction   Level of Consciousness (AVPU) alert   Respiratory Effort Unlabored   Expansion/Accessory Muscles/Retractions expansion symmetric;no retractions;no use of accessory muscles   All Lung Fields Breath Sounds clear   Rhythm/Pattern, Respiratory no shortness of breath reported;pattern regular;unlabored   PRE-TX-O2   O2 Device (Oxygen Therapy) room air   SpO2 97 %   Pulse Oximetry Type Continuous   $ Pulse Oximetry - Multiple Charge Pulse Oximetry - Multiple   Pulse 68   Resp 16   Respiratory Interventions   Cough And Deep Breathing done with encouragement   Breathing Techniques/Airway Clearance deep/controlled cough encouraged;diaphragmatic breathing promoted   Respiratory Evaluation   $ Care Plan Tech Time 15 min   Evaluation For Re-Eval 3 day

## 2020-05-23 NOTE — SUBJECTIVE & OBJECTIVE
Interval History:    Review of Systems   Constitutional: Negative for activity change and appetite change.   HENT: Negative for congestion and dental problem.    Eyes: Negative for discharge and itching.   Respiratory: Negative for shortness of breath.    Cardiovascular: Negative for chest pain.   Gastrointestinal: Negative for abdominal distention and abdominal pain.   Endocrine: Negative for cold intolerance.   Genitourinary: Negative for difficulty urinating and dysuria.   Musculoskeletal: Negative for arthralgias and back pain.   Skin: Negative for color change.   Neurological: Negative for dizziness and facial asymmetry.   Hematological: Negative for adenopathy.   Psychiatric/Behavioral: Negative for agitation and behavioral problems.     Objective:     Vital Signs (Most Recent):  Temp: 99.2 °F (37.3 °C) (05/22/20 1928)  Pulse: 75 (05/22/20 1928)  Resp: 18 (05/22/20 1928)  BP: (!) 164/81 (05/22/20 1928)  SpO2: (!) 94 % (05/22/20 1928) Vital Signs (24h Range):  Temp:  [98.2 °F (36.8 °C)-99.2 °F (37.3 °C)] 99.2 °F (37.3 °C)  Pulse:  [75-79] 75  Resp:  [16-19] 18  SpO2:  [94 %-97 %] 94 %  BP: (144-164)/(71-81) 164/81     Weight: 65.1 kg (143 lb 8.3 oz)  Body mass index is 22.48 kg/m².    Intake/Output Summary (Last 24 hours) at 5/22/2020 2046  Last data filed at 5/22/2020 0600  Gross per 24 hour   Intake 240 ml   Output 1550 ml   Net -1310 ml      Physical Exam   Constitutional: She is oriented to person, place, and time. No distress.   HENT:   Right Ear: External ear normal.   Left Ear: External ear normal.   Eyes: Pupils are equal, round, and reactive to light. EOM are normal.   Neck: Neck supple.   Cardiovascular: Normal rate.   Pulmonary/Chest: Effort normal and breath sounds normal.   Abdominal: Soft. Bowel sounds are normal.   Musculoskeletal: Normal range of motion. She exhibits no edema.   Neurological: She is alert and oriented to person, place, and time.   Skin: Skin is warm.   Psychiatric: She has a  normal mood and affect.   Nursing note and vitals reviewed.      Significant Labs:   CBC:   Recent Labs   Lab 05/21/20  0400 05/22/20  0150   WBC 5.95 4.78   HGB 8.6* 8.7*   HCT 26.1* 26.5*    173     CMP:   Recent Labs   Lab 05/21/20  0400 05/22/20  0150    136   K 4.1 3.3*    106   CO2 21* 23   GLU 86 86   BUN 23 19   CREATININE 1.8* 1.5*   CALCIUM 8.7 8.4*   PROT 5.5* 5.1*   ALBUMIN 2.9* 2.8*   BILITOT 1.2* 1.2*   ALKPHOS 149* 133   AST 19 14   ALT 17 12   ANIONGAP 10 7*   EGFRNONAA 28.1* 35.0*

## 2020-05-23 NOTE — PT/OT/SLP PROGRESS
Physical Therapy      Patient Name:  Shara Hutchins   MRN:  [id number]    Patient not seen today secondary to Pain(Nurse notified. ). Will follow-up 5/25/2020.    Salome Samson, ERVIN

## 2020-05-26 ENCOUNTER — TELEPHONE (OUTPATIENT)
Dept: FAMILY MEDICINE | Facility: CLINIC | Age: 70
End: 2020-05-26

## 2020-05-26 LAB — BACTERIA UR CULT: ABNORMAL

## 2020-05-26 NOTE — TELEPHONE ENCOUNTER
DC Date   Reached out to schedule HFU.  Unable to schedule at time of call LVM.    Clinical staff notified

## 2020-05-28 ENCOUNTER — TELEPHONE (OUTPATIENT)
Dept: SURGERY | Facility: CLINIC | Age: 70
End: 2020-05-28

## 2020-05-28 ENCOUNTER — OFFICE VISIT (OUTPATIENT)
Dept: SURGERY | Facility: CLINIC | Age: 70
End: 2020-05-28
Payer: MEDICARE

## 2020-05-28 VITALS
BODY MASS INDEX: 22.44 KG/M2 | WEIGHT: 143 LBS | DIASTOLIC BLOOD PRESSURE: 83 MMHG | HEART RATE: 78 BPM | SYSTOLIC BLOOD PRESSURE: 161 MMHG | TEMPERATURE: 99 F | HEIGHT: 67 IN

## 2020-05-28 DIAGNOSIS — R19.8 HIGH OUTPUT ILEOSTOMY: Primary | ICD-10-CM

## 2020-05-28 DIAGNOSIS — Z93.2 HIGH OUTPUT ILEOSTOMY: Primary | ICD-10-CM

## 2020-05-28 PROCEDURE — 99024 POSTOP FOLLOW-UP VISIT: CPT | Mod: S$GLB,,, | Performed by: SURGERY

## 2020-05-28 PROCEDURE — 99024 PR POST-OP FOLLOW-UP VISIT: ICD-10-PCS | Mod: S$GLB,,, | Performed by: SURGERY

## 2020-05-28 RX ORDER — SERTRALINE HYDROCHLORIDE 50 MG/1
1 TABLET, FILM COATED ORAL DAILY
COMMUNITY
Start: 2020-05-23 | End: 2020-06-02

## 2020-05-28 NOTE — TELEPHONE ENCOUNTER
Reviewed post op Covid 19 screening questions with pt. Pt denies having cough, shortness of breath or fever. Has not had to be tested for Covid since surgery on 5/18/20.

## 2020-05-28 NOTE — LETTER
May 28, 2020      April Horton MD  [address]  [place] [address]           [facility]  [address]  [place] [address]  Phone: [phone]  Fax: [phone]          Patient: Shara Hutchins   MR Number: [id number]   YOB: 1950   Date of Visit: 5/28/2020       Dear Dr. April Horton:    Thank you for referring Shara Hutchins to me for evaluation. Attached you will find relevant portions of my assessment and plan of care.    If you have questions, please do not hesitate to call me. I look forward to following Shara Hutchins along with you.    Sincerely,    Robbi Lovell III, MD    Enclosure  CC:  No Recipients    If you would like to receive this communication electronically, please contact [email or web address] or [phone] to request more information on [facility] [employer] access.    For providers and/or their staff who would like to refer a patient to [facility], please contact us through our one-stop-shop provider referral line, [facility], at [phone].    If you feel you have received this communication in error or would no longer like to receive these types of communications, please e-mail [email or web address]

## 2020-05-28 NOTE — PROGRESS NOTES
Subjective:       Patient ID: Shara Hutchins is a 70 y.o. female.    Chief Complaint: Post-op Evaluation ([facility] DOS 5/18/20 Ileostomy closure )      HPI:  Patient is s/p ileostomy reversal. She states she is doing very well. She is tolerating diet. She is having bowel movements with soft solid stool.      Review of Systems    Objective:      Physical Exam   Constitutional: She is oriented to person, place, and time. No distress.   Pulmonary/Chest: Effort normal. No respiratory distress.   Abdominal: Soft. She exhibits no distension. There is no tenderness. There is no rigidity, no rebound and no guarding.       Incisions healing well. The open aspect is clean. There is no pus or cellulitis.    Neurological: She is alert and oriented to person, place, and time.       Assessment/Plan:   High output ileostomy      Patient doing very well post op. Doing better than expected with eating, PT, and overall. Keep wound covered until healed.    Follow up if symptoms worsen or fail to improve.

## 2020-05-29 ENCOUNTER — EXTERNAL HOME HEALTH (OUTPATIENT)
Dept: HOME HEALTH SERVICES | Facility: HOSPITAL | Age: 70
End: 2020-05-29

## 2020-05-31 NOTE — PROGRESS NOTES
SUBJECTIVE:    Patient ID: Shara Hutchins is a 70 y.o. female.    Chief Complaint: Hospital Follow Up    HPI       70-year-old female with history of hypertension, diabetes, chronic kidney disease stage 5, anemia of chronic disease, irritable bowel disease, hypothyroidism, vitamin-D deficiency, B12 deficiency, pulmonary hypertension, and vulvar cancer status post radiation with prolonged hospitalization for proctitis and pneumatosis of right colon status post exploratory laparotomy with lysis of adhesions and loop ileostomy placement on February 13th.  Subsequently she had extremely high output from the ileostomy causing dehydration issues.  She has been at an LTAC receiving IV fluids to keep up with fluid demands.  She was originally on TPN after the surgery but has been weaned off. Patient was discharged from LTAC and presented to the hospital for ileostomy reversal. Post reversal  the patient was admitted to the hospital for postoperative care and management.    Admit Date: 5/18/20   Discharge Date: 5/23/20  Discharge Facility: Hospital    Medication Reconciliation:  Medications changed/added/deleted. See below  New Prescriptions filled after discharge: yes  pantoprazole 40 MG tablet  mirtazapine 15 MG tablet  Discharge summary reviewed:  yes  Pending test results at discharge reviewed:   yes  Follow up appointments scheduled:  no   with General Surgery and PCP  Follow up labs/tests ordered:   Yes    Basic metabolic panel  CBC auto differential       Since the ileostomy reversal she has had no  More diarrhea-however she has bladder catheter for ongoing hematuria with clots      Recent Labs   Lab 05/22/20  0150 05/23/20  0502    136   K 3.3* 2.8*    106   CO2 23 23   GLU 86 81   BUN 19 17   CREATININE 1.5* 1.5*   CALCIUM 8.4* 8.2*   PROT 5.1* 5.1*   ALBUMIN 2.8* 2.7*   BILITOT 1.2* 0.9   ALKPHOS 133 125   AST 14 13   ALT 12 11   ANIONGAP 7* 7*   ESTGFRAFRICA 40.4* 40.4*   EGFRNONAA 35.0* 35.0*    and  CBC        Recent Labs   Lab 05/22/20  0150 05/23/20  0502   WBC 4.78 4.75   HGB 8.7* 9.2*   HCT 26.5* 26.9*    187      Home Health ordered on discharge:   yes  Home Health company name: [facility]  DME ordered at discharge:   Yes   [facility]    TRANSFER TUB BENCH   3 IN 1 COMMODE   How patient is feeling since discharge from the hospital?  See above        Medications:  Reconciled Home Medications:       Medication List       START taking these medications -helps nerves and sleep   mirtazapine 15 MG tablet  Commonly known as:  REMERON  Take 1 tablet (15 mg total) by mouth every evening.      pantoprazole 40 MG tablet  Commonly known as:  PROTONIX  Take 1 tablet (40 mg total) by mouth once daily. for 21 days  Replaces:  pantoprazole 40 mg injection          CHANGE how you take these medications    LORazepam 0.5 MG tablet-she is not taking anymore  Commonly known as:  ATIVAN  Take 1 tablet (0.5 mg total) by mouth every 12 (twelve) hours as needed for Anxiety.  What changed:    · medication strength  · when to take this  · reasons to take this      oxyCODONE-acetaminophen 5-325 mg per tablet-no longer taking  Commonly known as:  PERCOCET  Take 1 tablet by mouth every 8 (eight) hours as needed for Pain.  What changed:    · when to take this  · reasons to take this          CONTINUE taking these medications    cholestyramine-aspartame 4 gram Pwpk-still taking  Commonly known as:  QUESTRAN LIGHT  Take 1 packet (4 g total) by mouth 2 (two) times daily. for 21 days      levothyroxine 88 MCG tablet  Commonly known as:  SYNTHROID  Take 1 tablet (88 mcg total) by mouth before breakfast.          STOP taking these medications    acetaminophen 325 MG tablet  Commonly known as:  TYLENOL      dextrose 50%  injection      diphenhydramine 12.5 mg Inj      dronabinoL 2.5 MG capsule-not taking  Commonly known as:  MARINOL      glucose 4 GM chewable tablet      lactated ringers infusion      magnesium oxide 400 mg  (241.3 mg magnesium) tablet  Commonly known as:  MAG-OX      melatonin 3 mg tablet-not taking  Commonly known as:  MELATIN      ondansetron 8 MG Tbdl-not taking  Commonly known as:  ZOFRAN-ODT      pantoprazole 40 mg injection  Commonly known as:  PROTONIX  Replaced by:  pantoprazole 40 MG tablet      psyllium husk (with sugar) 3.4 gram packet-not taking      RestoriL 30 mg capsule-not taking  Generic drug:  temazepam                 Past Medical History:   Diagnosis Date    Chronic kidney disease, stage III (moderate) 1/7/2018    Diabetes mellitus, type 2     Fatigue     Hypertension     Iron deficiency anemia due to chronic blood loss 1/7/2018    Vulvar cancer 1/7/2019     Past Surgical History:   Procedure Laterality Date    ABDOMINAL SURGERY      AV FISTULA PLACEMENT Left 2018    BACK SURGERY      carpel tunnel surgery once on each hand      CERVICAL FUSION      x 4    cervix repair      CHOLECYSTECTOMY  2000    DIAGNOSTIC LAPAROSCOPY N/A 2/13/2020    Procedure: LAPAROSCOPY, DIAGNOSTIC;  Surgeon: Robbi Lovell III, MD;  Location: [facility];  Service: General;  Laterality: N/A;    HYSTERECTOMY  1980    ILEOSTOMY N/A 2/13/2020    Procedure: CREATION, ILEOSTOMY Loop;  Surgeon: Robbi Lovell III, MD;  Location: [facility] OR;  Service: General;  Laterality: N/A;    ILEOSTOMY CLOSURE N/A 5/18/2020    Procedure: CLOSURE, ILEOSTOMY;  Surgeon: Robbi Lovell III, MD;  Location: [facility] OR;  Service: General;  Laterality: N/A;    LUMBAR LAMINECTOMY      LYSIS OF ADHESIONS N/A 2/13/2020    Procedure: LYSIS, ADHESIONS;  Surgeon: Robbi Lovell III, MD;  Location: [facility] OR;  Service: General;  Laterality: N/A;    NECK SURGERY      PHLEBOGRAPHY  2/28/2020    Procedure: VENOGRAM;  Surgeon: Robbi Lovell III, MD;  Location: [facility] OR;  Service: General;;    REMOVAL OF VASCULAR ACCESS PORT Right 2/13/2020    Procedure: REMOVAL, VASCULAR ACCESS PORT;  Surgeon: Robbi Lovell III, MD;  [facility]  [facility];  Service: General;  Laterality: Right;    SHOULDER SURGERY      vulva cancer       Family History   Problem Relation Age of Onset    Heart disease Mother     Hypertension Mother     Cancer Mother         lung    Heart disease Father     Heart disease Sister         Open heart surgery    No Known Problems Daughter        Marital Status:   Alcohol History:  reports that she does not drink alcohol.  Tobacco History:  reports that she quit smoking about 19 years ago. Her smoking use included cigarettes. She has a 33.00 pack-year smoking history. She quit smokeless tobacco use about 20 years ago.  Drug History:  reports that she does not use drugs.    Review of patient's allergies indicates:   Allergen Reactions    Ciprofloxacin Other (See Comments)     Elevated liver enzymes      Pcn [penicillins] Anaphylaxis       Current Outpatient Medications:     calcitRIOL (ROCALTROL) 0.5 MCG Cap, Take 0.5 mcg by mouth once daily., Disp: , Rfl:     cholestyramine-aspartame (QUESTRAN LIGHT) 4 gram PwPk, Take 1 packet (4 g total) by mouth 2 (two) times daily. for 21 days, Disp: 42 packet, Rfl: 0    dicyclomine (BENTYL) 10 MG capsule, Take 10 mg by mouth 3 (three) times daily., Disp: , Rfl:     diltiaZEM (CARDIZEM CD) 120 MG Cp24, Take 120 mg by mouth once daily., Disp: , Rfl:     ferrous sulfate 324 mg (65 mg iron) TbEC, Take 325 mg by mouth once daily., Disp: , Rfl:     levothyroxine (SYNTHROID) 88 MCG tablet, Take 1 tablet (88 mcg total) by mouth before breakfast., Disp: 30 tablet, Rfl: 11    magnesium oxide (MAG-OX) 400 mg (241.3 mg magnesium) tablet, Take 800 mg by mouth once daily., Disp: , Rfl:     mirtazapine (REMERON) 15 MG tablet, Take 1 tablet (15 mg total) by mouth every evening., Disp: 30 tablet, Rfl: 11    potassium chloride (KLOR-CON ORAL), Take 10 mEq by mouth 2 (two) times a day., Disp: , Rfl:     sodium bicarbonate 325 MG tablet, Take 325 mg by mouth 3 (three) times daily., Disp:  ", Rfl:     LORazepam (ATIVAN) 0.5 MG tablet, Take 1 tablet (0.5 mg total) by mouth every 12 (twelve) hours as needed for Anxiety. (Patient not taking: Reported on 6/2/2020), Disp: 8 tablet, Rfl: 0    Review of Systems   Constitutional: Negative for appetite change, chills, diaphoresis, fatigue, fever and unexpected weight change (gaining weight post op ).   HENT: Negative for congestion, ear pain, hearing loss, nosebleeds, postnasal drip, sinus pressure, sinus pain, sneezing, sore throat, tinnitus, trouble swallowing and voice change.    Eyes: Negative for photophobia, pain, itching and visual disturbance.   Respiratory: Negative for apnea, cough, chest tightness, shortness of breath, wheezing and stridor.    Cardiovascular: Negative for chest pain, palpitations and leg swelling.   Gastrointestinal: Negative for abdominal distention, abdominal pain, blood in stool, constipation, diarrhea, nausea and vomiting.        HPI   Endocrine: Negative for cold intolerance, heat intolerance, polydipsia and polyuria.   Genitourinary: Positive for dysuria and hematuria (HPI). Negative for difficulty urinating, dyspareunia, flank pain, frequency, menstrual problem, pelvic pain, urgency, vaginal discharge and vaginal pain.   Musculoskeletal: Negative for arthralgias, back pain, joint swelling, myalgias, neck pain and neck stiffness.   Skin: Negative for pallor.   Allergic/Immunologic: Negative for environmental allergies and food allergies.   Neurological: Negative for dizziness, tremors, speech difficulty, weakness, light-headedness and numbness.   Hematological: Does not bruise/bleed easily.   Psychiatric/Behavioral: Negative for agitation, confusion, decreased concentration, sleep disturbance and suicidal ideas. The patient is not nervous/anxious.         Objective:      Vitals:    06/02/20 1613   BP: 122/70   Pulse: 80   Resp: 16   Temp: 98.9 °F (37.2 °C)   SpO2: 97%   Weight: 60.3 kg (133 lb)   Height: 5' 7" (1.702 m) "     Physical Exam   Constitutional: She is oriented to person, place, and time. She appears well-developed. She is cooperative. No distress.   Thin female NAD   HENT:   Head: Normocephalic and atraumatic.   Nose: Nose normal.   Mouth/Throat: Uvula is midline and mucous membranes are normal.   Extreme pallor   Eyes: Pupils are equal, round, and reactive to light. Conjunctivae and EOM are normal. Right eye exhibits no discharge. Left eye exhibits no discharge. No scleral icterus. Right pupil is round and reactive. Left pupil is round and reactive.   Neck: Trachea normal and normal range of motion. Neck supple. No JVD present. Carotid bruit is not present. No thyromegaly present.   Cardiovascular: Normal rate, regular rhythm and intact distal pulses. Exam reveals no gallop and no friction rub.   No murmur heard.  Pulmonary/Chest: Effort normal and breath sounds normal. No respiratory distress. She has no wheezes. She has no rales.   Abdominal: Soft. Bowel sounds are normal. She exhibits no distension and no mass. There is no tenderness. There is no guarding. No hernia.   Musculoskeletal: Normal range of motion. She exhibits edema (edema of RLE versus LLE).   Neurological: She is alert and oriented to person, place, and time. She has normal strength.   Skin: Skin is warm and dry. Capillary refill takes less than 2 seconds. No lesion and no rash noted. No cyanosis. Nails show no clubbing.   Bruises scattered over extremities-pallor   Psychiatric: She has a normal mood and affect. Her speech is normal and behavior is normal. Judgment and thought content normal.   Nursing note and vitals reviewed.      Assessment:       1. Iron deficiency anemia due to chronic blood loss    2. Hypokalemia    3. Chronic kidney disease, stage III (moderate)    4. Hypoproteinemia         Plan:       Iron deficiency anemia due to chronic blood loss  -     CBC auto differential; Future; Expected date: 06/09/2020    Hypokalemia  -      Comprehensive metabolic panel; Future; Expected date: 06/09/2020    Chronic kidney disease, stage III (moderate)  -     Comprehensive metabolic panel; Future; Expected date: 06/09/2020    Hypoproteinemia  -     Comprehensive metabolic panel; Future; Expected date: 06/09/2020      No follow-ups on file.

## 2020-05-31 NOTE — PROGRESS NOTES
SUBJECTIVE:    Patient ID: Shara Hutchins is a 70 y.o. female.    Chief Complaint: No chief complaint on file.    HPI     No results displayed because visit has over 200 results.      Hospital Outpatient Visit on 05/15/2020   Component Date Value Ref Range Status    SARS-CoV2 (COVID-19) Qualitative P* 05/15/2020 Not Detected  Not Detected Final   Lab Visit on 05/15/2020   Component Date Value Ref Range Status    Sodium 05/15/2020 139  136 - 145 mmol/L Final    Potassium 05/15/2020 5.0  3.5 - 5.1 mmol/L Final    Chloride 05/15/2020 110  95 - 110 mmol/L Final    CO2 05/15/2020 27  23 - 29 mmol/L Final    Glucose 05/15/2020 91  70 - 110 mg/dL Final    BUN, Bld 05/15/2020 21  8 - 23 mg/dL Final    Creatinine 05/15/2020 2.1* 0.5 - 1.4 mg/dL Final    Calcium 05/15/2020 8.7  8.7 - 10.5 mg/dL Final    Total Protein 05/15/2020 5.6* 6.0 - 8.4 g/dL Final    Albumin 05/15/2020 3.0* 3.5 - 5.2 g/dL Final    Total Bilirubin 05/15/2020 0.7  0.1 - 1.0 mg/dL Final    Alkaline Phosphatase 05/15/2020 152* 55 - 135 U/L Final    AST 05/15/2020 27  10 - 40 U/L Final    ALT 05/15/2020 12  10 - 44 U/L Final    Anion Gap 05/15/2020 2* 8 - 16 mmol/L Final    eGFR if African American 05/15/2020 26.9* >60 mL/min/1.73 m^2 Final    eGFR if non African American 05/15/2020 23.3* >60 mL/min/1.73 m^2 Final    WBC 05/15/2020 4.85  3.90 - 12.70 K/uL Final    RBC 05/15/2020 2.66* 4.00 - 5.40 M/uL Final    Hemoglobin 05/15/2020 8.4* 12.0 - 16.0 g/dL Final    Hematocrit 05/15/2020 27.1* 37.0 - 48.5 % Final    Mean Corpuscular Volume 05/15/2020 102* 82 - 98 fL Final    Mean Corpuscular Hemoglobin 05/15/2020 31.6* 27.0 - 31.0 pg Final    Mean Corpuscular Hemoglobin Conc 05/15/2020 31.0* 32.0 - 36.0 g/dL Final    RDW 05/15/2020 15.9* 11.5 - 14.5 % Final    Platelets 05/15/2020 188  150 - 350 K/uL Final    MPV 05/15/2020 10.2  9.2 - 12.9 fL Final    Immature Granulocytes 05/15/2020 0.2  0.0 - 0.5 % Final    Gran # (ANC)  05/15/2020 2.2  1.8 - 7.7 K/uL Final    Immature Grans (Abs) 05/15/2020 0.01  0.00 - 0.04 K/uL Final    Lymph # 05/15/2020 1.4  1.0 - 4.8 K/uL Final    Mono # 05/15/2020 0.3  0.3 - 1.0 K/uL Final    Eos # 05/15/2020 1.0* 0.0 - 0.5 K/uL Final    Baso # 05/15/2020 0.02  0.00 - 0.20 K/uL Final    nRBC 05/15/2020 0  0 /100 WBC Final    Gran% 05/15/2020 44.9  38.0 - 73.0 % Final    Lymph% 05/15/2020 28.5  18.0 - 48.0 % Final    Mono% 05/15/2020 6.2  4.0 - 15.0 % Final    Eosinophil% 05/15/2020 19.8* 0.0 - 8.0 % Final    Basophil% 05/15/2020 0.4  0.0 - 1.9 % Final    Differential Method 05/15/2020 Automated   Final    Group & Rh 05/15/2020 A POS   Final    Indirect Bruno 05/15/2020 NEG   Final   No results displayed because visit has over 200 results.      Admission on 03/06/2020, Discharged on 03/07/2020   Component Date Value Ref Range Status    WBC 03/06/2020 3.61* 3.90 - 12.70 K/uL Final    RBC 03/06/2020 2.40* 4.00 - 5.40 M/uL Final    Hemoglobin 03/06/2020 7.4* 12.0 - 16.0 g/dL Final    Hematocrit 03/06/2020 24.7* 37.0 - 48.5 % Final    Mean Corpuscular Volume 03/06/2020 103* 82 - 98 fL Final    Mean Corpuscular Hemoglobin 03/06/2020 30.8  27.0 - 31.0 pg Final    Mean Corpuscular Hemoglobin Conc 03/06/2020 30.0* 32.0 - 36.0 g/dL Final    RDW 03/06/2020 16.0* 11.5 - 14.5 % Final    Platelets 03/06/2020 85* 150 - 350 K/uL Final    MPV 03/06/2020 12.8  9.2 - 12.9 fL Final    Immature Granulocytes 03/06/2020 0.6* 0.0 - 0.5 % Final    Gran # (ANC) 03/06/2020 2.1  1.8 - 7.7 K/uL Final    Immature Grans (Abs) 03/06/2020 0.02  0.00 - 0.04 K/uL Final    Lymph # 03/06/2020 0.9* 1.0 - 4.8 K/uL Final    Mono # 03/06/2020 0.4  0.3 - 1.0 K/uL Final    Eos # 03/06/2020 0.2  0.0 - 0.5 K/uL Final    Baso # 03/06/2020 0.02  0.00 - 0.20 K/uL Final    nRBC 03/06/2020 0  0 /100 WBC Final    Gran% 03/06/2020 57.3  38.0 - 73.0 % Final    Lymph% 03/06/2020 23.5  18.0 - 48.0 % Final    Mono% 03/06/2020  12.2  4.0 - 15.0 % Final    Eosinophil% 03/06/2020 5.8  0.0 - 8.0 % Final    Basophil% 03/06/2020 0.6  0.0 - 1.9 % Final    Differential Method 03/06/2020 Automated   Final    Magnesium 03/06/2020 2.3  1.6 - 2.6 mg/dL Final    Sodium 03/06/2020 143  136 - 145 mmol/L Final    Potassium 03/06/2020 3.5  3.5 - 5.1 mmol/L Final    Chloride 03/06/2020 107  95 - 110 mmol/L Final    CO2 03/06/2020 30* 23 - 29 mmol/L Final    Glucose 03/06/2020 125* 70 - 110 mg/dL Final    BUN, Bld 03/06/2020 28* 8 - 23 mg/dL Final    Creatinine 03/06/2020 2.1* 0.5 - 1.4 mg/dL Final    Calcium 03/06/2020 7.7* 8.7 - 10.5 mg/dL Final    Total Protein 03/06/2020 4.2* 6.0 - 8.4 g/dL Final    Albumin 03/06/2020 2.1* 3.5 - 5.2 g/dL Final    Total Bilirubin 03/06/2020 0.7  0.1 - 1.0 mg/dL Final    Alkaline Phosphatase 03/06/2020 223* 55 - 135 U/L Final    AST 03/06/2020 21  10 - 40 U/L Final    ALT 03/06/2020 14  10 - 44 U/L Final    Anion Gap 03/06/2020 6* 8 - 16 mmol/L Final    eGFR if African American 03/06/2020 27* >60 mL/min/1.73 m^2 Final    eGFR if non African American 03/06/2020 23* >60 mL/min/1.73 m^2 Final    Phosphorus 03/06/2020 2.4* 2.7 - 4.5 mg/dL Final    WBC 03/07/2020 4.25  3.90 - 12.70 K/uL Final    RBC 03/07/2020 2.49* 4.00 - 5.40 M/uL Final    Hemoglobin 03/07/2020 7.8* 12.0 - 16.0 g/dL Final    Hematocrit 03/07/2020 25.4* 37.0 - 48.5 % Final    Mean Corpuscular Volume 03/07/2020 102* 82 - 98 fL Final    Mean Corpuscular Hemoglobin 03/07/2020 31.3* 27.0 - 31.0 pg Final    Mean Corpuscular Hemoglobin Conc 03/07/2020 30.7* 32.0 - 36.0 g/dL Final    RDW 03/07/2020 16.1* 11.5 - 14.5 % Final    Platelets 03/07/2020 100* 150 - 350 K/uL Final    MPV 03/07/2020 12.7  9.2 - 12.9 fL Final    Immature Granulocytes 03/07/2020 0.5  0.0 - 0.5 % Final    Gran # (ANC) 03/07/2020 2.1  1.8 - 7.7 K/uL Final    Immature Grans (Abs) 03/07/2020 0.02  0.00 - 0.04 K/uL Final    Lymph # 03/07/2020 1.4  1.0 - 4.8 K/uL  Final    Mono # 03/07/2020 0.4  0.3 - 1.0 K/uL Final    Eos # 03/07/2020 0.3  0.0 - 0.5 K/uL Final    Baso # 03/07/2020 0.02  0.00 - 0.20 K/uL Final    nRBC 03/07/2020 0  0 /100 WBC Final    Gran% 03/07/2020 49.4  38.0 - 73.0 % Final    Lymph% 03/07/2020 32.9  18.0 - 48.0 % Final    Mono% 03/07/2020 10.1  4.0 - 15.0 % Final    Eosinophil% 03/07/2020 6.6  0.0 - 8.0 % Final    Basophil% 03/07/2020 0.5  0.0 - 1.9 % Final    Differential Method 03/07/2020 Automated   Final    Magnesium 03/07/2020 2.1  1.6 - 2.6 mg/dL Final    Sodium 03/07/2020 144  136 - 145 mmol/L Final    Potassium 03/07/2020 4.0  3.5 - 5.1 mmol/L Final    Chloride 03/07/2020 109  95 - 110 mmol/L Final    CO2 03/07/2020 27  23 - 29 mmol/L Final    Glucose 03/07/2020 68* 70 - 110 mg/dL Final    BUN, Bld 03/07/2020 27* 8 - 23 mg/dL Final    Creatinine 03/07/2020 1.8* 0.5 - 1.4 mg/dL Final    Calcium 03/07/2020 7.9* 8.7 - 10.5 mg/dL Final    Total Protein 03/07/2020 4.3* 6.0 - 8.4 g/dL Final    Albumin 03/07/2020 2.2* 3.5 - 5.2 g/dL Final    Total Bilirubin 03/07/2020 0.8  0.1 - 1.0 mg/dL Final    Alkaline Phosphatase 03/07/2020 220* 55 - 135 U/L Final    AST 03/07/2020 20  10 - 40 U/L Final    ALT 03/07/2020 16  10 - 44 U/L Final    Anion Gap 03/07/2020 8  8 - 16 mmol/L Final    eGFR if  03/07/2020 33* >60 mL/min/1.73 m^2 Final    eGFR if non African American 03/07/2020 28* >60 mL/min/1.73 m^2 Final    Phosphorus 03/07/2020 2.6* 2.7 - 4.5 mg/dL Final   No results displayed because visit has over 200 results.      No results displayed because visit has over 200 results.      Lab Visit on 02/03/2020   Component Date Value Ref Range Status    WBC 02/03/2020 8.83  3.90 - 12.70 K/uL Final    RBC 02/03/2020 3.12* 4.00 - 5.40 M/uL Final    Hemoglobin 02/03/2020 10.0* 12.0 - 16.0 g/dL Final    Hematocrit 02/03/2020 31.0* 37.0 - 48.5 % Final    Mean Corpuscular Volume 02/03/2020 99* 82 - 98 fL Final    Mean  Corpuscular Hemoglobin 02/03/2020 32.1* 27.0 - 31.0 pg Final    Mean Corpuscular Hemoglobin Conc 02/03/2020 32.3  32.0 - 36.0 g/dL Final    RDW 02/03/2020 16.1* 11.5 - 14.5 % Final    Platelets 02/03/2020 176  150 - 350 K/uL Final    MPV 02/03/2020 11.0  9.2 - 12.9 fL Final    Immature Granulocytes 02/03/2020 0.3  0.0 - 0.5 % Final    Gran # (ANC) 02/03/2020 6.5  1.8 - 7.7 K/uL Final    Immature Grans (Abs) 02/03/2020 0.03  0.00 - 0.04 K/uL Final    Lymph # 02/03/2020 1.5  1.0 - 4.8 K/uL Final    Mono # 02/03/2020 0.7  0.3 - 1.0 K/uL Final    Eos # 02/03/2020 0.2  0.0 - 0.5 K/uL Final    Baso # 02/03/2020 0.03  0.00 - 0.20 K/uL Final    nRBC 02/03/2020 0  0 /100 WBC Final    Gran% 02/03/2020 73.6* 38.0 - 73.0 % Final    Lymph% 02/03/2020 16.4* 18.0 - 48.0 % Final    Mono% 02/03/2020 7.4  4.0 - 15.0 % Final    Eosinophil% 02/03/2020 2.0  0.0 - 8.0 % Final    Basophil% 02/03/2020 0.3  0.0 - 1.9 % Final    Differential Method 02/03/2020 Automated   Final   Lab Visit on 01/22/2020   Component Date Value Ref Range Status    Hemoglobin A1C 01/22/2020 SEE COMMENT  4.5 - 6.2 % Final    Estimated Avg Glucose 01/22/2020 SEE COMMENT  68 - 131 mg/dL Final    [facility] Miscellaneous Test 01/22/2020 COMMENT   Final    [facility] Test Code: 01/22/2020 [id number]   Final    [facility] Test Name: 01/22/2020 hemoglobin A1C   Final   Lab Visit on 01/10/2020   Component Date Value Ref Range Status    WBC 01/10/2020 6.25  3.90 - 12.70 K/uL Final    RBC 01/10/2020 2.96* 4.00 - 5.40 M/uL Final    Hemoglobin 01/10/2020 9.5* 12.0 - 16.0 g/dL Final    Hematocrit 01/10/2020 30.3* 37.0 - 48.5 % Final    Mean Corpuscular Volume 01/10/2020 102* 82 - 98 fL Final    Mean Corpuscular Hemoglobin 01/10/2020 32.1* 27.0 - 31.0 pg Final    Mean Corpuscular Hemoglobin Conc 01/10/2020 31.4* 32.0 - 36.0 g/dL Final    RDW 01/10/2020 15.6* 11.5 - 14.5 % Final    Platelets 01/10/2020 145* 150 - 350 K/uL Final    MPV 01/10/2020 11.7  9.2 -  12.9 fL Final    Immature Granulocytes 01/10/2020 1.0* 0.0 - 0.5 % Final    Gran # (ANC) 01/10/2020 3.9  1.8 - 7.7 K/uL Final    Immature Grans (Abs) 01/10/2020 0.06* 0.00 - 0.04 K/uL Final    Lymph # 01/10/2020 1.7  1.0 - 4.8 K/uL Final    Mono # 01/10/2020 0.4  0.3 - 1.0 K/uL Final    Eos # 01/10/2020 0.2  0.0 - 0.5 K/uL Final    Baso # 01/10/2020 0.02  0.00 - 0.20 K/uL Final    nRBC 01/10/2020 0  0 /100 WBC Final    Gran% 01/10/2020 62.7  38.0 - 73.0 % Final    Lymph% 01/10/2020 27.4  18.0 - 48.0 % Final    Mono% 01/10/2020 6.2  4.0 - 15.0 % Final    Eosinophil% 01/10/2020 2.4  0.0 - 8.0 % Final    Basophil% 01/10/2020 0.3  0.0 - 1.9 % Final    Differential Method 01/10/2020 Automated   Final    Glucose 01/10/2020 83  70 - 110 mg/dL Final    Sodium 01/10/2020 143  136 - 145 mmol/L Final    Potassium 01/10/2020 3.5  3.5 - 5.1 mmol/L Final    Chloride 01/10/2020 120* 95 - 110 mmol/L Final    CO2 01/10/2020 16* 23 - 29 mmol/L Final    BUN, Bld 01/10/2020 21  8 - 23 mg/dL Final    Calcium 01/10/2020 8.6* 8.7 - 10.5 mg/dL Final    Creatinine 01/10/2020 2.8* 0.5 - 1.4 mg/dL Final    Albumin 01/10/2020 3.7  3.5 - 5.2 g/dL Final    Phosphorus 01/10/2020 3.9  2.7 - 4.5 mg/dL Final    eGFR if African American 01/10/2020 19.1* >60 mL/min/1.73 m^2 Final    eGFR if non African American 01/10/2020 16.6* >60 mL/min/1.73 m^2 Final    Anion Gap 01/10/2020 7* 8 - 16 mmol/L Final    Ferritin 01/10/2020 322* 20.0 - 300.0 ng/mL Final    Iron 01/10/2020 64  30 - 160 ug/dL Final    Transferrin 01/10/2020 125* 200 - 375 mg/dL Final    TIBC 01/10/2020 175* 250 - 450 ug/dL Final    Saturated Iron 01/10/2020 37  20 - 50 % Final   There may be more visits with results that are not included.       Past Medical History:   Diagnosis Date    Chronic kidney disease, stage III (moderate) 1/7/2018    Diabetes mellitus, type 2     Fatigue     Hypertension     Iron deficiency anemia due to chronic blood loss  1/7/2018    Vulvar cancer 1/7/2019     Past Surgical History:   Procedure Laterality Date    ABDOMINAL SURGERY      AV FISTULA PLACEMENT Left 2018    BACK SURGERY      carpel tunnel surgery once on each hand      CERVICAL FUSION      x 4    cervix repair      CHOLECYSTECTOMY  2000    DIAGNOSTIC LAPAROSCOPY N/A 2/13/2020    Procedure: LAPAROSCOPY, DIAGNOSTIC;  Surgeon: Robbi Lovell III, MD;  Location: [facility];  Service: General;  Laterality: N/A;    HYSTERECTOMY  1980    ILEOSTOMY N/A 2/13/2020    Procedure: CREATION, ILEOSTOMY Loop;  Surgeon: Robbi Lovell III, MD;  Location: [facility] OR;  Service: General;  Laterality: N/A;    ILEOSTOMY CLOSURE N/A 5/18/2020    Procedure: CLOSURE, ILEOSTOMY;  Surgeon: Robbi Lovell III, MD;  Location: [facility];  Service: General;  Laterality: N/A;    LUMBAR LAMINECTOMY      LYSIS OF ADHESIONS N/A 2/13/2020    Procedure: LYSIS, ADHESIONS;  Surgeon: Robbi Lovell III, MD;  Location: [facility];  Service: General;  Laterality: N/A;    NECK SURGERY      PHLEBOGRAPHY  2/28/2020    Procedure: VENOGRAM;  Surgeon: Robbi Lovell III, MD;  Location: [facility];  Service: General;;    REMOVAL OF VASCULAR ACCESS PORT Right 2/13/2020    Procedure: REMOVAL, VASCULAR ACCESS PORT;  Surgeon: Robbi Lovell III, MD;  Location: [facility];  Service: General;  Laterality: Right;    SHOULDER SURGERY      vulva cancer       Family History   Problem Relation Age of Onset    Heart disease Mother     Hypertension Mother     Cancer Mother         lung    Heart disease Father     Heart disease Sister         Open heart surgery    No Known Problems Daughter        Marital Status:   Alcohol History:  reports that she does not drink alcohol.  Tobacco History:  reports that she quit smoking about 19 years ago. Her smoking use included cigarettes. She has a 33.00 pack-year smoking history. She quit smokeless tobacco use about 20 years ago.  Drug History:  reports  that she does not use drugs.    Review of patient's allergies indicates:   Allergen Reactions    Ciprofloxacin Other (See Comments)     Elevated liver enzymes      Pcn [penicillins] Anaphylaxis       Current Outpatient Medications:     cholestyramine-aspartame (QUESTRAN LIGHT) 4 gram PwPk, Take 1 packet (4 g total) by mouth 2 (two) times daily. for 21 days, Disp: 42 packet, Rfl: 0    levothyroxine (SYNTHROID) 88 MCG tablet, Take 1 tablet (88 mcg total) by mouth before breakfast., Disp: 30 tablet, Rfl: 11    LORazepam (ATIVAN) 0.5 MG tablet, Take 1 tablet (0.5 mg total) by mouth every 12 (twelve) hours as needed for Anxiety., Disp: 8 tablet, Rfl: 0    mirtazapine (REMERON) 15 MG tablet, Take 1 tablet (15 mg total) by mouth every evening., Disp: 30 tablet, Rfl: 11    oxyCODONE-acetaminophen (PERCOCET) 5-325 mg per tablet, Take 1 tablet by mouth every 8 (eight) hours as needed for Pain., Disp: 12 tablet, Rfl: 0    pantoprazole (PROTONIX) 40 MG tablet, Take 1 tablet (40 mg total) by mouth once daily. for 21 days, Disp: 21 tablet, Rfl: 0    sertraline (ZOLOFT) 50 MG tablet, Take 1 tablet by mouth once daily., Disp: , Rfl:     Review of Systems       Objective:      There were no vitals filed for this visit.  Physical Exam      Assessment:       No diagnosis found.     Plan:       There are no diagnoses linked to this encounter.  No follow-ups on file.        5/31/2020 April Horton M.D.

## 2020-06-01 ENCOUNTER — TELEPHONE (OUTPATIENT)
Dept: UROLOGY | Facility: CLINIC | Age: 70
End: 2020-06-01

## 2020-06-01 NOTE — TELEPHONE ENCOUNTER
----- Message from Damari Barber Jr., MD sent at 5/31/2020  9:41 AM CDT -----  Please contact patient regarding rescheduling her missed virtual visit. Thanks.

## 2020-06-01 NOTE — TELEPHONE ENCOUNTER
Called and spoke to patient to reschedule appointment that was missed. Patient scheduled 6/10 with Dr. Barber. Patient verbalized understanding.

## 2020-06-02 ENCOUNTER — OFFICE VISIT (OUTPATIENT)
Dept: FAMILY MEDICINE | Facility: CLINIC | Age: 70
End: 2020-06-02
Payer: MEDICARE

## 2020-06-02 VITALS
RESPIRATION RATE: 16 BRPM | SYSTOLIC BLOOD PRESSURE: 122 MMHG | WEIGHT: 133 LBS | HEART RATE: 80 BPM | DIASTOLIC BLOOD PRESSURE: 70 MMHG | TEMPERATURE: 99 F | OXYGEN SATURATION: 97 % | HEIGHT: 67 IN | BODY MASS INDEX: 20.88 KG/M2

## 2020-06-02 DIAGNOSIS — E77.8 HYPOPROTEINEMIA: ICD-10-CM

## 2020-06-02 DIAGNOSIS — N18.30 CHRONIC KIDNEY DISEASE, STAGE III (MODERATE): ICD-10-CM

## 2020-06-02 DIAGNOSIS — E87.6 HYPOKALEMIA: ICD-10-CM

## 2020-06-02 DIAGNOSIS — R30.0 DYSURIA: ICD-10-CM

## 2020-06-02 DIAGNOSIS — D50.0 IRON DEFICIENCY ANEMIA DUE TO CHRONIC BLOOD LOSS: Primary | ICD-10-CM

## 2020-06-02 PROCEDURE — 3074F PR MOST RECENT SYSTOLIC BLOOD PRESSURE < 130 MM HG: ICD-10-PCS | Mod: S$GLB,,, | Performed by: INTERNAL MEDICINE

## 2020-06-02 PROCEDURE — 1111F DSCHRG MED/CURRENT MED MERGE: CPT | Mod: S$GLB,,, | Performed by: INTERNAL MEDICINE

## 2020-06-02 PROCEDURE — 1101F PT FALLS ASSESS-DOCD LE1/YR: CPT | Mod: S$GLB,,, | Performed by: INTERNAL MEDICINE

## 2020-06-02 PROCEDURE — 99214 OFFICE O/P EST MOD 30 MIN: CPT | Mod: S$GLB,,, | Performed by: INTERNAL MEDICINE

## 2020-06-02 PROCEDURE — 1126F AMNT PAIN NOTED NONE PRSNT: CPT | Mod: S$GLB,,, | Performed by: INTERNAL MEDICINE

## 2020-06-02 PROCEDURE — 1126F PR PAIN SEVERITY QUANTIFIED, NO PAIN PRESENT: ICD-10-PCS | Mod: S$GLB,,, | Performed by: INTERNAL MEDICINE

## 2020-06-02 PROCEDURE — 3078F DIAST BP <80 MM HG: CPT | Mod: S$GLB,,, | Performed by: INTERNAL MEDICINE

## 2020-06-02 PROCEDURE — 99214 PR OFFICE/OUTPT VISIT, EST, LEVL IV, 30-39 MIN: ICD-10-PCS | Mod: S$GLB,,, | Performed by: INTERNAL MEDICINE

## 2020-06-02 PROCEDURE — 1159F PR MEDICATION LIST DOCUMENTED IN MEDICAL RECORD: ICD-10-PCS | Mod: S$GLB,,, | Performed by: INTERNAL MEDICINE

## 2020-06-02 PROCEDURE — 1101F PR PT FALLS ASSESS DOC 0-1 FALLS W/OUT INJ PAST YR: ICD-10-PCS | Mod: S$GLB,,, | Performed by: INTERNAL MEDICINE

## 2020-06-02 PROCEDURE — 1159F MED LIST DOCD IN RCRD: CPT | Mod: S$GLB,,, | Performed by: INTERNAL MEDICINE

## 2020-06-02 PROCEDURE — 3074F SYST BP LT 130 MM HG: CPT | Mod: S$GLB,,, | Performed by: INTERNAL MEDICINE

## 2020-06-02 PROCEDURE — 1111F PR DISCHARGE MEDS RECONCILED W/ CURRENT OUTPATIENT MED LIST: ICD-10-PCS | Mod: S$GLB,,, | Performed by: INTERNAL MEDICINE

## 2020-06-02 PROCEDURE — 3078F PR MOST RECENT DIASTOLIC BLOOD PRESSURE < 80 MM HG: ICD-10-PCS | Mod: S$GLB,,, | Performed by: INTERNAL MEDICINE

## 2020-06-02 RX ORDER — FERROUS SULFATE 324(65)MG
325 TABLET, DELAYED RELEASE (ENTERIC COATED) ORAL DAILY
COMMUNITY
End: 2020-12-09

## 2020-06-02 RX ORDER — LANOLIN ALCOHOL/MO/W.PET/CERES
800 CREAM (GRAM) TOPICAL DAILY
COMMUNITY
End: 2020-12-09

## 2020-06-02 RX ORDER — DILTIAZEM HYDROCHLORIDE 120 MG/1
120 CAPSULE, COATED, EXTENDED RELEASE ORAL DAILY
Status: ON HOLD | COMMUNITY
End: 2020-12-11

## 2020-06-02 RX ORDER — CALCITRIOL 0.5 UG/1
0.5 CAPSULE ORAL DAILY
COMMUNITY
End: 2020-12-09

## 2020-06-02 RX ORDER — NITROFURANTOIN (MACROCRYSTALS) 100 MG/1
100 CAPSULE ORAL EVERY 12 HOURS
Qty: 14 CAPSULE | Refills: 0 | Status: SHIPPED | OUTPATIENT
Start: 2020-06-02 | End: 2020-06-03 | Stop reason: SDUPTHER

## 2020-06-02 RX ORDER — SODIUM BICARBONATE 325 MG/1
650 TABLET ORAL 2 TIMES DAILY
COMMUNITY
End: 2020-07-30 | Stop reason: SDUPTHER

## 2020-06-02 RX ORDER — DICYCLOMINE HYDROCHLORIDE 10 MG/1
10 CAPSULE ORAL 3 TIMES DAILY
COMMUNITY
End: 2020-06-29

## 2020-06-02 RX ORDER — PHENAZOPYRIDINE HYDROCHLORIDE 100 MG/1
100 TABLET, FILM COATED ORAL 3 TIMES DAILY PRN
Qty: 9 TABLET | Refills: 0 | Status: SHIPPED | OUTPATIENT
Start: 2020-06-02 | End: 2020-06-03 | Stop reason: SDUPTHER

## 2020-06-03 ENCOUNTER — LAB VISIT (OUTPATIENT)
Dept: LAB | Facility: HOSPITAL | Age: 70
End: 2020-06-03
Attending: INTERNAL MEDICINE
Payer: MEDICARE

## 2020-06-03 DIAGNOSIS — R30.0 DYSURIA: ICD-10-CM

## 2020-06-03 DIAGNOSIS — N18.30 CHRONIC KIDNEY DISEASE, STAGE III (MODERATE): ICD-10-CM

## 2020-06-03 DIAGNOSIS — D50.0 IRON DEFICIENCY ANEMIA SECONDARY TO BLOOD LOSS (CHRONIC): Primary | ICD-10-CM

## 2020-06-03 DIAGNOSIS — Z46.6 FITTING AND ADJUSTMENT OF URINARY DEVICE: ICD-10-CM

## 2020-06-03 LAB
ERYTHROCYTE [DISTWIDTH] IN BLOOD BY AUTOMATED COUNT: 17.9 % (ref 11.5–14.5)
HCT VFR BLD AUTO: 29.3 % (ref 37–48.5)
HGB BLD-MCNC: 9.1 G/DL (ref 12–16)
MCH RBC QN AUTO: 31.8 PG (ref 27–31)
MCHC RBC AUTO-ENTMCNC: 31.1 G/DL (ref 32–36)
MCV RBC AUTO: 102 FL (ref 82–98)
PLATELET # BLD AUTO: 233 K/UL (ref 150–350)
PMV BLD AUTO: 10.3 FL (ref 9.2–12.9)
RBC # BLD AUTO: 2.86 M/UL (ref 4–5.4)
WBC # BLD AUTO: 8.13 K/UL (ref 3.9–12.7)

## 2020-06-03 PROCEDURE — 87086 URINE CULTURE/COLONY COUNT: CPT

## 2020-06-03 PROCEDURE — 85027 COMPLETE CBC AUTOMATED: CPT

## 2020-06-03 PROCEDURE — 87186 SC STD MICRODIL/AGAR DIL: CPT

## 2020-06-03 PROCEDURE — 87077 CULTURE AEROBIC IDENTIFY: CPT

## 2020-06-03 PROCEDURE — 80053 COMPREHEN METABOLIC PANEL: CPT

## 2020-06-03 PROCEDURE — 87088 URINE BACTERIA CULTURE: CPT

## 2020-06-03 RX ORDER — NITROFURANTOIN (MACROCRYSTALS) 100 MG/1
100 CAPSULE ORAL EVERY 12 HOURS
Qty: 14 CAPSULE | Refills: 0 | Status: ON HOLD | OUTPATIENT
Start: 2020-06-03 | End: 2020-06-18 | Stop reason: HOSPADM

## 2020-06-03 RX ORDER — PHENAZOPYRIDINE HYDROCHLORIDE 100 MG/1
100 TABLET, FILM COATED ORAL 3 TIMES DAILY PRN
Qty: 9 TABLET | Refills: 0 | Status: ON HOLD | OUTPATIENT
Start: 2020-06-03 | End: 2020-06-18 | Stop reason: HOSPADM

## 2020-06-04 LAB
ALBUMIN SERPL BCP-MCNC: 3 G/DL (ref 3.5–5.2)
ALP SERPL-CCNC: 118 U/L (ref 55–135)
ALT SERPL W/O P-5'-P-CCNC: 9 U/L (ref 10–44)
ANION GAP SERPL CALC-SCNC: 10 MMOL/L (ref 8–16)
AST SERPL-CCNC: 12 U/L (ref 10–40)
BILIRUB SERPL-MCNC: 0.4 MG/DL (ref 0.1–1)
BUN SERPL-MCNC: 22 MG/DL (ref 8–23)
CALCIUM SERPL-MCNC: 8.4 MG/DL (ref 8.7–10.5)
CHLORIDE SERPL-SCNC: 117 MMOL/L (ref 95–110)
CO2 SERPL-SCNC: 14 MMOL/L (ref 23–29)
CREAT SERPL-MCNC: 2.2 MG/DL (ref 0.5–1.4)
EST. GFR  (AFRICAN AMERICAN): 25.4 ML/MIN/1.73 M^2
EST. GFR  (NON AFRICAN AMERICAN): 22.1 ML/MIN/1.73 M^2
GLUCOSE SERPL-MCNC: 90 MG/DL (ref 70–110)
POTASSIUM SERPL-SCNC: 4.3 MMOL/L (ref 3.5–5.1)
PROT SERPL-MCNC: 5.7 G/DL (ref 6–8.4)
SODIUM SERPL-SCNC: 141 MMOL/L (ref 136–145)

## 2020-06-05 LAB — BACTERIA UR CULT: ABNORMAL

## 2020-06-08 ENCOUNTER — PATIENT MESSAGE (OUTPATIENT)
Dept: FAMILY MEDICINE | Facility: CLINIC | Age: 70
End: 2020-06-08

## 2020-06-08 NOTE — TELEPHONE ENCOUNTER
Call pt-she has a significant metabolic acisosis and needs to contact her renal specialist and send a copy of renal studies to him from here for recommendations for dose of Na HCO3 today

## 2020-06-10 ENCOUNTER — OFFICE VISIT (OUTPATIENT)
Dept: UROLOGY | Facility: CLINIC | Age: 70
End: 2020-06-10
Payer: MEDICARE

## 2020-06-10 ENCOUNTER — PATIENT MESSAGE (OUTPATIENT)
Dept: FAMILY MEDICINE | Facility: CLINIC | Age: 70
End: 2020-06-10

## 2020-06-10 VITALS
HEART RATE: 79 BPM | TEMPERATURE: 98 F | RESPIRATION RATE: 18 BRPM | BODY MASS INDEX: 21.45 KG/M2 | HEIGHT: 67 IN | SYSTOLIC BLOOD PRESSURE: 138 MMHG | DIASTOLIC BLOOD PRESSURE: 81 MMHG | WEIGHT: 136.69 LBS

## 2020-06-10 DIAGNOSIS — R31.0 GROSS HEMATURIA: Primary | ICD-10-CM

## 2020-06-10 PROCEDURE — 1101F PT FALLS ASSESS-DOCD LE1/YR: CPT | Mod: S$GLB,,, | Performed by: UROLOGY

## 2020-06-10 PROCEDURE — 99215 OFFICE O/P EST HI 40 MIN: CPT | Mod: S$GLB,,, | Performed by: UROLOGY

## 2020-06-10 PROCEDURE — 3079F PR MOST RECENT DIASTOLIC BLOOD PRESSURE 80-89 MM HG: ICD-10-PCS | Mod: S$GLB,,, | Performed by: UROLOGY

## 2020-06-10 PROCEDURE — 99999 PR PBB SHADOW E&M-EST. PATIENT-LVL IV: ICD-10-PCS | Mod: PBBFAC,,, | Performed by: UROLOGY

## 2020-06-10 PROCEDURE — 3079F DIAST BP 80-89 MM HG: CPT | Mod: S$GLB,,, | Performed by: UROLOGY

## 2020-06-10 PROCEDURE — 1101F PR PT FALLS ASSESS DOC 0-1 FALLS W/OUT INJ PAST YR: ICD-10-PCS | Mod: S$GLB,,, | Performed by: UROLOGY

## 2020-06-10 PROCEDURE — 1125F PR PAIN SEVERITY QUANTIFIED, PAIN PRESENT: ICD-10-PCS | Mod: S$GLB,,, | Performed by: UROLOGY

## 2020-06-10 PROCEDURE — 99215 PR OFFICE/OUTPT VISIT, EST, LEVL V, 40-54 MIN: ICD-10-PCS | Mod: S$GLB,,, | Performed by: UROLOGY

## 2020-06-10 PROCEDURE — 1125F AMNT PAIN NOTED PAIN PRSNT: CPT | Mod: S$GLB,,, | Performed by: UROLOGY

## 2020-06-10 PROCEDURE — 3075F PR MOST RECENT SYSTOLIC BLOOD PRESS GE 130-139MM HG: ICD-10-PCS | Mod: S$GLB,,, | Performed by: UROLOGY

## 2020-06-10 PROCEDURE — 1159F PR MEDICATION LIST DOCUMENTED IN MEDICAL RECORD: ICD-10-PCS | Mod: S$GLB,,, | Performed by: UROLOGY

## 2020-06-10 PROCEDURE — 3075F SYST BP GE 130 - 139MM HG: CPT | Mod: S$GLB,,, | Performed by: UROLOGY

## 2020-06-10 PROCEDURE — 1159F MED LIST DOCD IN RCRD: CPT | Mod: S$GLB,,, | Performed by: UROLOGY

## 2020-06-10 PROCEDURE — 99999 PR PBB SHADOW E&M-EST. PATIENT-LVL IV: CPT | Mod: PBBFAC,,, | Performed by: UROLOGY

## 2020-06-10 RX ORDER — OXYBUTYNIN CHLORIDE 10 MG/1
10 TABLET, EXTENDED RELEASE ORAL DAILY
Qty: 30 TABLET | Refills: 0 | Status: SHIPPED | OUTPATIENT
Start: 2020-06-10 | End: 2020-06-25 | Stop reason: ALTCHOICE

## 2020-06-10 NOTE — PROGRESS NOTES
[facility] Established Patient/H&P:    Shara Hutchins is a 70 y.o. female who presents for follow up for gross hematuria.    Patient with a history of HTN, vulvar cancer s/p chemo/XRT in 2013 and CKD who presented to the [facility] emergency department on 2/23/20 with abdominal pain, vaginal bleeding, DARCI  (3.8 from 2.5 baseline) and a 20 pound weight loss. Lee was placed on admission to monitor UOP. CTRSS 2/12 2/23 showed bilateral nonobstructing renal stones.      She underwent loop ileostomy on 2/13/20 for chronic diarrhea. Renal ultrasound on 2/26 showed no hydro, b renal stones and indeterminate artifact. She went to [facility] on 3/7 and returned and states gross hematuria started after this. Prior to admission pt denied having gross hematuria, urinary frequency, vaginal bleeding or frequent uti's. She did however state that her vulvar itching had returned and she was concerned about recurrence of cancer with no recent f/u by gyn onc. She has seen by  inpt and plan was for outpt f/u with .      She has been awaiting LTAC placement on 3/13. Consulted for persistent hematuria. The plan was for outpt cysto to eval for any bladder path not seen on ct and discussion of renal stones as well as discharging with lee. Her hematuria continued, ucx on 3/15 grew Klebsiella and she was started on rocephin and completed a 5d course. Around 3/17  she was given a voiding trial vs lee exchange? on 3/17 for larger 18fr.       She continued to have hematuria requiring intermittent irrigation and 2 transfusions. Urology was re-consulted 3/24, Dr. Garcia asked them to remove the 18fr, place a 22 [place] 3 way. CBI weaned off. She underwent ileostomy reversal on 5/18/20. Discharged from the hospital on 5/23/20.      Interval History    6/10/20: Patient presents today with hematuria for one day. She was prescribed Pyridium and Macrodantin for presumed UTI.      She denies any fever, chills,  nausea, emesis, bone pain, weight loss or  trauma. Still with same catheter due to difficulty exchanging catheter.     Past Medical History:   Diagnosis Date    Chronic kidney disease, stage III (moderate) 1/7/2018    Diabetes mellitus, type 2     Fatigue     Hypertension     Iron deficiency anemia due to chronic blood loss 1/7/2018    Vulvar cancer 1/7/2019       Past Surgical History:   Procedure Laterality Date    ABDOMINAL SURGERY      AV FISTULA PLACEMENT Left 2018    BACK SURGERY      carpel tunnel surgery once on each hand      CERVICAL FUSION      x 4    cervix repair      CHOLECYSTECTOMY  2000    DIAGNOSTIC LAPAROSCOPY N/A 2/13/2020    Procedure: LAPAROSCOPY, DIAGNOSTIC;  Surgeon: Robbi Lovell III, MD;  Location: [facility];  Service: General;  Laterality: N/A;    HYSTERECTOMY  1980    ILEOSTOMY N/A 2/13/2020    Procedure: CREATION, ILEOSTOMY Loop;  Surgeon: Robbi Lovell III, MD;  Location: [facility];  Service: General;  Laterality: N/A;    ILEOSTOMY CLOSURE N/A 5/18/2020    Procedure: CLOSURE, ILEOSTOMY;  Surgeon: Robbi Lovell III, MD;  Location: [facility];  Service: General;  Laterality: N/A;    LUMBAR LAMINECTOMY      LYSIS OF ADHESIONS N/A 2/13/2020    Procedure: LYSIS, ADHESIONS;  Surgeon: Robbi Lovell III, MD;  Location: [facility];  Service: General;  Laterality: N/A;    NECK SURGERY      PHLEBOGRAPHY  2/28/2020    Procedure: VENOGRAM;  Surgeon: Robbi Lovell III, MD;  Location: [facility];  Service: General;;    REMOVAL OF VASCULAR ACCESS PORT Right 2/13/2020    Procedure: REMOVAL, VASCULAR ACCESS PORT;  Surgeon: Robbi Lovell III, MD;  Location: [facility];  Service: General;  Laterality: Right;    SHOULDER SURGERY      vulva cancer         Family History   Problem Relation Age of Onset    Heart disease Mother     Hypertension Mother     Cancer Mother         lung    Heart disease Father     Heart disease Sister         Open heart surgery    No  Known Problems Daughter        Social History     Socioeconomic History    Marital status:      Spouse name: Not on file    Number of children: Not on file    Years of education: Not on file    Highest education level: Not on file   Occupational History    Not on file   Social Needs    Financial resource strain: Not on file    Food insecurity:     Worry: Not on file     Inability: Not on file    Transportation needs:     Medical: Not on file     Non-medical: Not on file   Tobacco Use    Smoking status: Former Smoker     Packs/day: 1.00     Years: 33.00     Pack years: 33.00     Types: Cigarettes     Last attempt to quit: 2000     Years since quittin.8    Smokeless tobacco: Former User     Quit date:    Substance and Sexual Activity    Alcohol use: No    Drug use: No    Sexual activity: Not on file   Lifestyle    Physical activity:     Days per week: Not on file     Minutes per session: Not on file    Stress: Not on file   Relationships    Social connections:     Talks on phone: Not on file     Gets together: Not on file     Attends Hinduism service: Not on file     Active member of club or organization: Not on file     Attends meetings of clubs or organizations: Not on file     Relationship status: Not on file   Other Topics Concern    Not on file   Social History Narrative    Not on file       Review of patient's allergies indicates:   Allergen Reactions    Ciprofloxacin Other (See Comments)     Elevated liver enzymes      Pcn [penicillins] Anaphylaxis       Medications Reviewed: see MAR    ROS:    Constitutional: denies fevers, chills, night sweats, fatigue, malaise  Respiratory: negative for cough, shortness of breath, wheezing, dyspnea.  Cardiovascular: + for high blood pressure, negative for chest pain, varicose veins, ankle swelling, palpitations, syncope.  GI: negative for abdominal pain, heartburn, indigestion, nausea, vomiting, constipation, diarrhea, blood in  "stool.   Urology: as noted above in HPI  Endocrinology: negative for cold intolerance, excessive thirst, not feeling tired/sluggish, no heat intolerance.   Hematology/Lymph: negative for easy bleeding, easy bruising, swollen glands.  Musculoskeletal: negative for back pain, joint pain, joint swelling, neck pain.  Allergy-Immunology: negative for seasonal allergies, negative for unusual infections.   Skin: negative for boils, breast lumps, hives, itching, rash.   Neurology: negative for, dizziness, headache, tingling/numbness, tremors.   Psych: satisfied with life; negative for, anxiety, depression, suicidal thoughts.     PHYSICAL EXAM:    Vitals:    06/10/20 1043   BP: 138/81   Pulse: 79   Resp: 18   Temp: 97.7 °F (36.5 °C)     Body mass index is 21.41 kg/m². Weight: 62 kg (136 lb 11 oz) Height: 5' 7" (170.2 cm)       General: Alert, cooperative, no distress, appears stated age  Head: Normocephalic, without obvious abnormality, atraumatic  Neck: no masses, no thyromegaly, no lymphadenopathy  Eyes: PERRL, conjunctiva/corneas clear  Lungs: Respirations unlabored, normal effort, no accessory muscle use  CV: Warm and well perfused extremities  Abdomen: Soft, non-tender, no CVA tenderness, no hepatosplenomegaly, no hernia  Extremities: Extremities normal, atraumatic, no cyanosis or edema  Skin: Normal color, texture, and turgor, no rashes or lesions  Psych: Appropriate, well oriented, normal affect, normal mood  Neuro: Non-focal      LABS:    Recent Results (from the past 336 hour(s))   CBC Without Differential    Collection Time: 06/03/20  2:29 PM   Result Value Ref Range    WBC 8.13 3.90 - 12.70 K/uL    RBC 2.86 (L) 4.00 - 5.40 M/uL    Hemoglobin 9.1 (L) 12.0 - 16.0 g/dL    Hematocrit 29.3 (L) 37.0 - 48.5 %    Mean Corpuscular Volume 102 (H) 82 - 98 fL    Mean Corpuscular Hemoglobin 31.8 (H) 27.0 - 31.0 pg    Mean Corpuscular Hemoglobin Conc 31.1 (L) 32.0 - 36.0 g/dL    RDW 17.9 (H) 11.5 - 14.5 %    Platelets 233 150 - " 350 K/uL    MPV 10.3 9.2 - 12.9 fL   Comprehensive metabolic panel    Collection Time: 06/03/20  2:29 PM   Result Value Ref Range    Sodium 141 136 - 145 mmol/L    Potassium 4.3 3.5 - 5.1 mmol/L    Chloride 117 (H) 95 - 110 mmol/L    CO2 14 (L) 23 - 29 mmol/L    Glucose 90 70 - 110 mg/dL    BUN, Bld 22 8 - 23 mg/dL    Creatinine 2.2 (H) 0.5 - 1.4 mg/dL    Calcium 8.4 (L) 8.7 - 10.5 mg/dL    Total Protein 5.7 (L) 6.0 - 8.4 g/dL    Albumin 3.0 (L) 3.5 - 5.2 g/dL    Total Bilirubin 0.4 0.1 - 1.0 mg/dL    Alkaline Phosphatase 118 55 - 135 U/L    AST 12 10 - 40 U/L    ALT 9 (L) 10 - 44 U/L    Anion Gap 10 8 - 16 mmol/L    eGFR if African American 25.4 (A) >60 mL/min/1.73 m^2    eGFR if non  22.1 (A) >60 mL/min/1.73 m^2   Urine culture    Collection Time: 06/03/20  2:29 PM   Result Value Ref Range    Urine Culture, Routine ESCHERICHIA COLI  >100,000 cfu/ml   (A)        Susceptibility    Escherichia coli - CULTURE, URINE     Amp/Sulbactam >16/8 Resistant mcg/mL     Ampicillin >16 Resistant mcg/mL     Amox/K Clav'ate <=8/4 Sensitive mcg/mL     Ceftriaxone <=1 Sensitive mcg/mL     Cefazolin 4 Sensitive mcg/mL     Ciprofloxacin <=1 Sensitive mcg/mL     Cefepime <=2 Sensitive mcg/mL     Ertapenem <=0.5 Sensitive mcg/mL     Nitrofurantoin <=32 Sensitive mcg/mL     Gentamicin <=4 Sensitive mcg/mL     Levofloxacin <=2 Sensitive mcg/mL     Meropenem <=1 Sensitive mcg/mL     Piperacillin/Tazo <=16 Sensitive mcg/mL     Trimeth/Sulfa <=2/38 Sensitive mcg/mL     Tetracycline <=4 Sensitive mcg/mL     Tobramycin <=4 Sensitive mcg/mL       IMAGING:    US renal  3/25/20 Images independently reviewed by me    1.  Godwin catheter partially decompressing the bladder with trace adjacent debris.    2.  No hydronephrosis.    3.  Small kidneys with increased parenchymal echotexture suggesting mild medical renal disease.    4.  Dense medullary pyramidal calcifications seen throughout both kidneys in keeping with medullary  nephrocalcinosis (or possibly diffuse stone disease), consider correlation with the clinical history.    5.  Hepatic parenchymal echotexture consistent with hepatic steatosis.      Assessment/Diagnosis:    1. Gross hematuria  Case Request Operating Room: CYSTOSCOPY, WITH BLADDER BIOPSY, WITH FULGURATION IF INDICATED    Place in Outpatient    Vital Signs     Diet NPO    Place sequential compression device    Diet NPO       Plans:    - I spent 40 minutes with the patient; more than 50% was in counseling about the disease process and methods of treatment. We discussed the etiology and management of the patient's gross hematuria. Explained that hematuria is multi-factorial and can be secondary to  cancer, obstructive uropathy, nephritis,  trauma,  infections, thrombosis, nephrolithiasis, bleeding disorders and medications. We discussed hematuria work-up and the benefit of further evaluation with cystourethroscopy and possible fulguration to rule out any malignancy, stones or other pathology. All risks, benefits and alternatives discussed at length and all questions answered.    - Scheduled for cystoscopy with possible fulguration on 6/23/20. Needs COVID testing prior.

## 2020-06-11 RX ORDER — TRAMADOL HYDROCHLORIDE 50 MG/1
50 TABLET ORAL EVERY 4 HOURS PRN
Qty: 42 TABLET | Refills: 0 | Status: SHIPPED | OUTPATIENT
Start: 2020-06-11 | End: 2020-06-29 | Stop reason: SDUPTHER

## 2020-06-14 ENCOUNTER — HOSPITAL ENCOUNTER (INPATIENT)
Facility: HOSPITAL | Age: 70
LOS: 4 days | Discharge: HOME | DRG: 669 | End: 2020-06-18
Attending: EMERGENCY MEDICINE | Admitting: INTERNAL MEDICINE
Payer: MEDICARE

## 2020-06-14 DIAGNOSIS — R31.0 GROSS HEMATURIA: ICD-10-CM

## 2020-06-14 DIAGNOSIS — E87.5 HYPERKALEMIA: ICD-10-CM

## 2020-06-14 DIAGNOSIS — N17.9 AKI (ACUTE KIDNEY INJURY): ICD-10-CM

## 2020-06-14 DIAGNOSIS — D64.9 SYMPTOMATIC ANEMIA: Primary | ICD-10-CM

## 2020-06-14 LAB
ABO + RH BLD: NORMAL
ALBUMIN SERPL BCP-MCNC: 3.5 G/DL (ref 3.5–5.2)
ALP SERPL-CCNC: 122 U/L (ref 55–135)
ALT SERPL W/O P-5'-P-CCNC: 9 U/L (ref 10–44)
ANION GAP SERPL CALC-SCNC: 5 MMOL/L (ref 8–16)
AST SERPL-CCNC: 12 U/L (ref 10–40)
BACTERIA #/AREA URNS HPF: NEGATIVE /HPF
BASOPHILS # BLD AUTO: 0.02 K/UL (ref 0–0.2)
BASOPHILS NFR BLD: 0.3 % (ref 0–1.9)
BILIRUB SERPL-MCNC: 0.6 MG/DL (ref 0.1–1)
BILIRUB UR QL STRIP: ABNORMAL
BLD GP AB SCN CELLS X3 SERPL QL: NORMAL
BLD PROD TYP BPU: NORMAL
BLOOD UNIT EXPIRATION DATE: NORMAL
BLOOD UNIT TYPE CODE: 6200
BLOOD UNIT TYPE: NORMAL
BUN SERPL-MCNC: 29 MG/DL (ref 8–23)
CALCIUM SERPL-MCNC: 8.2 MG/DL (ref 8.7–10.5)
CHLORIDE SERPL-SCNC: 111 MMOL/L (ref 95–110)
CLARITY UR: ABNORMAL
CO2 SERPL-SCNC: 21 MMOL/L (ref 23–29)
CODING SYSTEM: NORMAL
COLOR UR: ABNORMAL
CREAT SERPL-MCNC: 2.1 MG/DL (ref 0.5–1.4)
DIFFERENTIAL METHOD: ABNORMAL
DISPENSE STATUS: NORMAL
EOSINOPHIL # BLD AUTO: 0.1 K/UL (ref 0–0.5)
EOSINOPHIL NFR BLD: 1.1 % (ref 0–8)
ERYTHROCYTE [DISTWIDTH] IN BLOOD BY AUTOMATED COUNT: 16 % (ref 11.5–14.5)
EST. GFR  (AFRICAN AMERICAN): 26.9 ML/MIN/1.73 M^2
EST. GFR  (NON AFRICAN AMERICAN): 23.3 ML/MIN/1.73 M^2
GLUCOSE SERPL-MCNC: 133 MG/DL (ref 70–110)
GLUCOSE UR QL STRIP: ABNORMAL
HCT VFR BLD AUTO: 23.7 % (ref 37–48.5)
HCT VFR BLD AUTO: 24.2 % (ref 37–48.5)
HGB BLD-MCNC: 7.6 G/DL (ref 12–16)
HGB BLD-MCNC: 7.7 G/DL (ref 12–16)
HGB UR QL STRIP: ABNORMAL
HYALINE CASTS #/AREA URNS LPF: 3 /LPF
IMM GRANULOCYTES # BLD AUTO: 0.02 K/UL (ref 0–0.04)
IMM GRANULOCYTES NFR BLD AUTO: 0.3 % (ref 0–0.5)
INR PPP: 1.3
KETONES UR QL STRIP: ABNORMAL
LEUKOCYTE ESTERASE UR QL STRIP: ABNORMAL
LYMPHOCYTES # BLD AUTO: 0.8 K/UL (ref 1–4.8)
LYMPHOCYTES NFR BLD: 11.6 % (ref 18–48)
MCH RBC QN AUTO: 31.5 PG (ref 27–31)
MCHC RBC AUTO-ENTMCNC: 31.4 G/DL (ref 32–36)
MCV RBC AUTO: 100 FL (ref 82–98)
MICROSCOPIC COMMENT: ABNORMAL
MONOCYTES # BLD AUTO: 0.3 K/UL (ref 0.3–1)
MONOCYTES NFR BLD: 3.9 % (ref 4–15)
MRSA SCREEN BY PCR: POSITIVE
NEUTROPHILS # BLD AUTO: 5.5 K/UL (ref 1.8–7.7)
NEUTROPHILS NFR BLD: 82.8 % (ref 38–73)
NITRITE UR QL STRIP: ABNORMAL
NRBC BLD-RTO: 0 /100 WBC
NUM UNITS TRANS PACKED RBC: NORMAL
PH UR STRIP: 7 [PH] (ref 5–8)
PLATELET # BLD AUTO: 281 K/UL (ref 150–350)
PMV BLD AUTO: 9.3 FL (ref 9.2–12.9)
POTASSIUM SERPL-SCNC: 5.3 MMOL/L (ref 3.5–5.1)
PROT SERPL-MCNC: 6.4 G/DL (ref 6–8.4)
PROT UR QL STRIP: ABNORMAL
PROTHROMBIN TIME: 15.5 SEC (ref 10.6–14.8)
RBC # BLD AUTO: 2.41 M/UL (ref 4–5.4)
RBC #/AREA URNS HPF: >100 /HPF (ref 0–4)
SARS-COV-2 RDRP RESP QL NAA+PROBE: NEGATIVE
SODIUM SERPL-SCNC: 137 MMOL/L (ref 136–145)
SP GR UR STRIP: 1.01 (ref 1–1.03)
SQUAMOUS #/AREA URNS HPF: 5 /HPF
TROPONIN I SERPL DL<=0.01 NG/ML-MCNC: <0.03 NG/ML
URN SPEC COLLECT METH UR: ABNORMAL
UROBILINOGEN UR STRIP-ACNC: ABNORMAL EU/DL
WBC # BLD AUTO: 6.65 K/UL (ref 3.9–12.7)
WBC #/AREA URNS HPF: 0 /HPF (ref 0–5)

## 2020-06-14 PROCEDURE — 87641 MR-STAPH DNA AMP PROBE: CPT

## 2020-06-14 PROCEDURE — 84484 ASSAY OF TROPONIN QUANT: CPT

## 2020-06-14 PROCEDURE — 86901 BLOOD TYPING SEROLOGIC RH(D): CPT

## 2020-06-14 PROCEDURE — 12000002 HC ACUTE/MED SURGE SEMI-PRIVATE ROOM

## 2020-06-14 PROCEDURE — 36415 COLL VENOUS BLD VENIPUNCTURE: CPT

## 2020-06-14 PROCEDURE — 87077 CULTURE AEROBIC IDENTIFY: CPT

## 2020-06-14 PROCEDURE — 85018 HEMOGLOBIN: CPT

## 2020-06-14 PROCEDURE — 80053 COMPREHEN METABOLIC PANEL: CPT

## 2020-06-14 PROCEDURE — 25000003 PHARM REV CODE 250: Performed by: NURSE PRACTITIONER

## 2020-06-14 PROCEDURE — 81001 URINALYSIS AUTO W/SCOPE: CPT

## 2020-06-14 PROCEDURE — 87186 SC STD MICRODIL/AGAR DIL: CPT

## 2020-06-14 PROCEDURE — P9016 RBC LEUKOCYTES REDUCED: HCPCS

## 2020-06-14 PROCEDURE — 85025 COMPLETE CBC W/AUTO DIFF WBC: CPT

## 2020-06-14 PROCEDURE — U0002 COVID-19 LAB TEST NON-CDC: HCPCS

## 2020-06-14 PROCEDURE — 85610 PROTHROMBIN TIME: CPT

## 2020-06-14 PROCEDURE — 85014 HEMATOCRIT: CPT

## 2020-06-14 PROCEDURE — 86920 COMPATIBILITY TEST SPIN: CPT

## 2020-06-14 PROCEDURE — 99285 EMERGENCY DEPT VISIT HI MDM: CPT | Mod: 25

## 2020-06-14 PROCEDURE — 63600175 PHARM REV CODE 636 W HCPCS: Performed by: EMERGENCY MEDICINE

## 2020-06-14 PROCEDURE — 96374 THER/PROPH/DIAG INJ IV PUSH: CPT

## 2020-06-14 PROCEDURE — 87086 URINE CULTURE/COLONY COUNT: CPT

## 2020-06-14 PROCEDURE — 36430 TRANSFUSION BLD/BLD COMPNT: CPT

## 2020-06-14 PROCEDURE — 63600175 PHARM REV CODE 636 W HCPCS: Performed by: NURSE PRACTITIONER

## 2020-06-14 RX ORDER — PROMETHAZINE HYDROCHLORIDE 25 MG/1
25 TABLET ORAL 2 TIMES DAILY PRN
Status: ON HOLD | COMMUNITY
End: 2020-06-18 | Stop reason: HOSPADM

## 2020-06-14 RX ORDER — HYDROCODONE BITARTRATE AND ACETAMINOPHEN 500; 5 MG/1; MG/1
TABLET ORAL
Status: DISCONTINUED | OUTPATIENT
Start: 2020-06-14 | End: 2020-06-18

## 2020-06-14 RX ORDER — SODIUM CHLORIDE 0.9 % (FLUSH) 0.9 %
10 SYRINGE (ML) INJECTION
Status: DISCONTINUED | OUTPATIENT
Start: 2020-06-14 | End: 2020-06-18 | Stop reason: HOSPADM

## 2020-06-14 RX ORDER — ONDANSETRON 2 MG/ML
4 INJECTION INTRAMUSCULAR; INTRAVENOUS
Status: COMPLETED | OUTPATIENT
Start: 2020-06-14 | End: 2020-06-14

## 2020-06-14 RX ORDER — DICYCLOMINE HYDROCHLORIDE 10 MG/1
10 CAPSULE ORAL 3 TIMES DAILY
Status: DISCONTINUED | OUTPATIENT
Start: 2020-06-14 | End: 2020-06-18 | Stop reason: HOSPADM

## 2020-06-14 RX ORDER — FERROUS SULFATE 325(65) MG
325 TABLET ORAL DAILY
Status: DISCONTINUED | OUTPATIENT
Start: 2020-06-15 | End: 2020-06-18 | Stop reason: HOSPADM

## 2020-06-14 RX ORDER — DILTIAZEM HYDROCHLORIDE 120 MG/1
120 CAPSULE, COATED, EXTENDED RELEASE ORAL DAILY
Status: DISCONTINUED | OUTPATIENT
Start: 2020-06-15 | End: 2020-06-18 | Stop reason: HOSPADM

## 2020-06-14 RX ORDER — SODIUM BICARBONATE 650 MG/1
650 TABLET ORAL 2 TIMES DAILY
Status: DISCONTINUED | OUTPATIENT
Start: 2020-06-14 | End: 2020-06-18 | Stop reason: HOSPADM

## 2020-06-14 RX ORDER — OXYBUTYNIN CHLORIDE 10 MG/1
10 TABLET, EXTENDED RELEASE ORAL DAILY
Status: DISCONTINUED | OUTPATIENT
Start: 2020-06-15 | End: 2020-06-18 | Stop reason: HOSPADM

## 2020-06-14 RX ORDER — MIRTAZAPINE 15 MG/1
15 TABLET, FILM COATED ORAL NIGHTLY
Status: DISCONTINUED | OUTPATIENT
Start: 2020-06-14 | End: 2020-06-18 | Stop reason: HOSPADM

## 2020-06-14 RX ORDER — ACETAMINOPHEN 325 MG/1
650 TABLET ORAL EVERY 4 HOURS PRN
Status: DISCONTINUED | OUTPATIENT
Start: 2020-06-14 | End: 2020-06-18 | Stop reason: HOSPADM

## 2020-06-14 RX ORDER — LEVOTHYROXINE SODIUM 88 UG/1
88 TABLET ORAL
Status: DISCONTINUED | OUTPATIENT
Start: 2020-06-15 | End: 2020-06-18 | Stop reason: HOSPADM

## 2020-06-14 RX ORDER — CALCITRIOL 0.25 UG/1
0.5 CAPSULE ORAL DAILY
Status: DISCONTINUED | OUTPATIENT
Start: 2020-06-15 | End: 2020-06-18 | Stop reason: HOSPADM

## 2020-06-14 RX ORDER — LANOLIN ALCOHOL/MO/W.PET/CERES
800 CREAM (GRAM) TOPICAL DAILY
Status: DISCONTINUED | OUTPATIENT
Start: 2020-06-14 | End: 2020-06-18 | Stop reason: HOSPADM

## 2020-06-14 RX ORDER — CHOLESTYRAMINE 4 G/4.8G
4 POWDER, FOR SUSPENSION ORAL 2 TIMES DAILY
Status: DISCONTINUED | OUTPATIENT
Start: 2020-06-14 | End: 2020-06-18 | Stop reason: HOSPADM

## 2020-06-14 RX ORDER — TRAMADOL HYDROCHLORIDE 50 MG/1
50 TABLET ORAL EVERY 4 HOURS PRN
Status: DISCONTINUED | OUTPATIENT
Start: 2020-06-14 | End: 2020-06-18 | Stop reason: HOSPADM

## 2020-06-14 RX ORDER — ONDANSETRON 2 MG/ML
4 INJECTION INTRAMUSCULAR; INTRAVENOUS EVERY 6 HOURS PRN
Status: DISCONTINUED | OUTPATIENT
Start: 2020-06-14 | End: 2020-06-18 | Stop reason: HOSPADM

## 2020-06-14 RX ADMIN — CHOLESTYRAMINE 4 G: 4 POWDER, FOR SUSPENSION ORAL at 08:06

## 2020-06-14 RX ADMIN — MIRTAZAPINE 15 MG: 15 TABLET, FILM COATED ORAL at 08:06

## 2020-06-14 RX ADMIN — ONDANSETRON 4 MG: 2 INJECTION INTRAMUSCULAR; INTRAVENOUS at 02:06

## 2020-06-14 RX ADMIN — CEFTRIAXONE SODIUM 1 G: 1 INJECTION, POWDER, FOR SOLUTION INTRAMUSCULAR; INTRAVENOUS at 04:06

## 2020-06-14 RX ADMIN — DICYCLOMINE HYDROCHLORIDE 10 MG: 10 CAPSULE ORAL at 04:06

## 2020-06-14 RX ADMIN — DICYCLOMINE HYDROCHLORIDE 10 MG: 10 CAPSULE ORAL at 08:06

## 2020-06-14 RX ADMIN — TRAMADOL HYDROCHLORIDE 50 MG: 50 TABLET, FILM COATED ORAL at 08:06

## 2020-06-14 RX ADMIN — TRAMADOL HYDROCHLORIDE 50 MG: 50 TABLET, FILM COATED ORAL at 04:06

## 2020-06-14 RX ADMIN — SODIUM BICARBONATE 650 MG TABLET 650 MG: at 08:06

## 2020-06-14 RX ADMIN — MAGNESIUM OXIDE 800 MG: 400 TABLET ORAL at 04:06

## 2020-06-14 NOTE — H&P
[facility] Medicine History & Physical Examination   Patient Name: Shara Hutchins  MRN: [id number]  Patient Class: IP- Inpatient   Admission Date: 6/14/2020 12:24 PM  Length of Stay: 0  Attending Physician: Johnathan Carbajal MD  Primary Care Provider: April Horton MD  Face-to-Face encounter date: 06/14/2020  Code Status: full code  Chief Complaint: Hematuria (X 4 DAYS, FEELING WEAK NOW) and Abdominal Pain (@ OSTOMY SITE)        Patient information was obtained from patient, past medical records and ER records.   HISTORY OF PRESENT ILLNESS:   Shara Hutchins is a 70 y.o. White female who  has a past medical history of Chronic kidney disease, stage III (moderate) (1/7/2018), Diabetes mellitus, type 2, Fatigue, Hypertension, Iron deficiency anemia due to chronic blood loss (1/7/2018), and Vulvar cancer (1/7/2019).. The patient presented to [facility] on 6/14/2020 with a primary complaint of Hematuria (X 4 DAYS, FEELING WEAK NOW) and Abdominal Pain (@ OSTOMY SITE)    History was obtained from the patient and ER physician Sign-out. Patient presents to the ED with the complaint of hematuria x 4 days and she is now feeling weak. She has a chronic indwelling catheter since March. She is followed by Urology. She saw urology 4 days ago for onset of gross bloody urine. She was started on Macrobid and pyridium at that time for UTI. She reports that hematuria persisted and she now feels weak. She also endorses mild pain to lower abdomen. She denies fever, chills, chest pain, sob, rectal bleeding, melena, numbness, tingling, or LOC.     In the ED, VSS.  CBC with h/h 7.6/24.2 which is decreased from few days prior. CMP with K+ 5.3 and worsening kidney function. Urology was consulted who recommended the patient be started on CBI.     Decision to admit was taken and patient was informed about the plan of care.   REVIEW OF SYSTEMS:   10 Point Review of System was performed and was found to be  negative except for that mentioned already in the HPI above.     PAST MEDICAL HISTORY:     Past Medical History:   Diagnosis Date    Chronic kidney disease, stage III (moderate) 1/7/2018    Diabetes mellitus, type 2     Fatigue     Hypertension     Iron deficiency anemia due to chronic blood loss 1/7/2018    Vulvar cancer 1/7/2019       PAST SURGICAL HISTORY:     Past Surgical History:   Procedure Laterality Date    ABDOMINAL SURGERY      AV FISTULA PLACEMENT Left 2018    BACK SURGERY      carpel tunnel surgery once on each hand      CERVICAL FUSION      x 4    cervix repair      CHOLECYSTECTOMY  2000    DIAGNOSTIC LAPAROSCOPY N/A 2/13/2020    Procedure: LAPAROSCOPY, DIAGNOSTIC;  Surgeon: Robbi Lovell III, MD;  Location: [facility];  Service: General;  Laterality: N/A;    HYSTERECTOMY  1980    ILEOSTOMY N/A 2/13/2020    Procedure: CREATION, ILEOSTOMY Loop;  Surgeon: Robbi Lovell III, MD;  Location: [facility];  Service: General;  Laterality: N/A;    ILEOSTOMY CLOSURE N/A 5/18/2020    Procedure: CLOSURE, ILEOSTOMY;  Surgeon: Robbi Lovell III, MD;  Location: [facility];  Service: General;  Laterality: N/A;    LUMBAR LAMINECTOMY      LYSIS OF ADHESIONS N/A 2/13/2020    Procedure: LYSIS, ADHESIONS;  Surgeon: Robbi Lovell III, MD;  Location: [facility] OR;  Service: General;  Laterality: N/A;    NECK SURGERY      PHLEBOGRAPHY  2/28/2020    Procedure: VENOGRAM;  Surgeon: Robbi Lovell III, MD;  Location: [facility];  Service: General;;    REMOVAL OF VASCULAR ACCESS PORT Right 2/13/2020    Procedure: REMOVAL, VASCULAR ACCESS PORT;  Surgeon: Robbi Lovell III, MD;  Location: [facility];  Service: General;  Laterality: Right;    SHOULDER SURGERY      vulva cancer         ALLERGIES:   Ciprofloxacin and Pcn [penicillins]    FAMILY HISTORY:     Family History   Problem Relation Age of Onset    Heart disease Mother     Hypertension Mother     Cancer Mother         lung    Heart disease  Father     Heart disease Sister         Open heart surgery    No Known Problems Daughter        SOCIAL HISTORY:     Social History     Tobacco Use    Smoking status: Former Smoker     Packs/day: 1.00     Years: 33.00     Pack years: 33.00     Types: Cigarettes     Quit date: 2000     Years since quittin.9    Smokeless tobacco: Former User     Quit date:    Substance Use Topics    Alcohol use: No        Social History     Substance and Sexual Activity   Sexual Activity Not on file        HOME MEDICATIONS:     Prior to Admission medications    Medication Sig Start Date End Date Taking? Authorizing Provider   calcitRIOL (ROCALTROL) 0.5 MCG Cap Take 0.5 mcg by mouth once daily.   Yes Historical Provider, MD   cholestyramine-aspartame (QUESTRAN LIGHT) 4 gram PwPk Take 1 packet (4 g total) by mouth 2 (two) times daily. for 21 days 20 Yes William Ambrocio MD   dicyclomine (BENTYL) 10 MG capsule Take 10 mg by mouth 3 (three) times daily.   Yes Historical Provider, MD   diltiaZEM (CARDIZEM CD) 120 MG Cp24 Take 120 mg by mouth once daily.   Yes Historical Provider, MD   ferrous sulfate 324 mg (65 mg iron) TbEC Take 325 mg by mouth once daily.   Yes Historical Provider, MD   levothyroxine (SYNTHROID) 88 MCG tablet Take 1 tablet (88 mcg total) by mouth before breakfast. 3/7/20 3/7/21 Yes Clifford Allen MD   magnesium oxide (MAG-OX) 400 mg (241.3 mg magnesium) tablet Take 800 mg by mouth once daily.   Yes Historical Provider, MD   mirtazapine (REMERON) 15 MG tablet Take 1 tablet (15 mg total) by mouth every evening. 20 Yes Johnathan Carbajal MD   oxybutynin (DITROPAN-XL) 10 MG 24 hr tablet Take 1 tablet (10 mg total) by mouth once daily. 6/10/20 7/10/20 Yes Damari Barber Jr., MD   potassium chloride (KLOR-CON ORAL) Take 10 mEq by mouth 2 (two) times a day.   Yes Historical Provider, MD   promethazine (PHENERGAN) 25 MG tablet Take 25 mg by mouth 2 (two) times daily as needed for Nausea.    "Yes Historical Provider, MD   sodium bicarbonate 325 MG tablet Take 650 mg by mouth 2 (two) times daily.    Yes Historical Provider, MD   traMADoL (ULTRAM) 50 mg tablet Take 1 tablet (50 mg total) by mouth every 4 (four) hours as needed for Pain. 6/11/20  Yes April Horton MD   LORazepam (ATIVAN) 0.5 MG tablet Take 1 tablet (0.5 mg total) by mouth every 12 (twelve) hours as needed for Anxiety.  Patient not taking: Reported on 6/2/2020 5/22/20   William Ambrocio MD   nitrofurantoin (MACRODANTIN) 100 MG capsule Take 1 capsule (100 mg total) by mouth every 12 (twelve) hours. 6/3/20   April Horton MD   phenazopyridine (PYRIDIUM) 100 MG tablet Take 1 tablet (100 mg total) by mouth 3 (three) times daily as needed for Pain. 6/3/20 6/13/20  April Horton MD         PHYSICAL EXAM:   /61   Pulse 69   Temp 98.2 °F (36.8 °C)   Resp 18   Ht 5' 7" (1.702 m)   Wt 56.7 kg (125 lb)   SpO2 100%   BMI 19.58 kg/m²   Vitals Reviewed  General appearance: Well-developed, thin White female who is sickly appearing.  Skin: No Rash.   Neuro: Motor and sensory exams grossly intact. Good tone. Generalized weakness. Strength equal in extremities  HENT: Atraumatic head. Moist mucous membranes of oral cavity.  Eyes: Normal extraocular movements. PERRLA  Neck: Supple. No evidence of lymphadenopathy. No thyroidomegaly.  Lungs: Clear to auscultation bilaterally. No wheezing present.   Heart: Regular rate and rhythm. S1 and S2 present with no murmurs/gallop/rub. No pedal edema. No JVD present.   Abdomen: Soft, non-distended, tenderness to suprapubic area. No rebound tenderness/guarding. No masses or organomegaly. Bowel sounds are normal. Bladder is not palpable. Small healing wound to RLQ abdomen clean dry and intact.   : lee catheter in place with bloody urine draining.   Extremities: No cyanosis, clubbing.  Psych/mental status: Alert and oriented. Mood and affect appropriate. Cooperative. Responds appropriately to questions. "   EMERGENCY DEPARTMENT LABS AND IMAGING:     Labs Reviewed   URINALYSIS, REFLEX TO URINE CULTURE - Abnormal; Notable for the following components:       Result Value    Color, UA Red (*)     Appearance, UA Cloudy (*)     All other components within normal limits    Narrative:     Preferred Collection Type->Urine, Clean Catch  Specimen Source->Urine   CBC W/ AUTO DIFFERENTIAL - Abnormal; Notable for the following components:    RBC 2.41 (*)     Hemoglobin 7.6 (*)     Hematocrit 24.2 (*)     Mean Corpuscular Volume 100 (*)     Mean Corpuscular Hemoglobin 31.5 (*)     Mean Corpuscular Hemoglobin Conc 31.4 (*)     RDW 16.0 (*)     Lymph # 0.8 (*)     Gran% 82.8 (*)     Lymph% 11.6 (*)     Mono% 3.9 (*)     All other components within normal limits   COMPREHENSIVE METABOLIC PANEL - Abnormal; Notable for the following components:    Potassium 5.3 (*)     Chloride 111 (*)     CO2 21 (*)     Glucose 133 (*)     BUN, Bld 29 (*)     Creatinine 2.1 (*)     Calcium 8.2 (*)     ALT 9 (*)     Anion Gap 5 (*)     eGFR if  26.9 (*)     eGFR if non  23.3 (*)     All other components within normal limits   PROTIME-INR - Abnormal; Notable for the following components:    PT 15.5 (*)     All other components within normal limits   URINALYSIS MICROSCOPIC - Abnormal; Notable for the following components:    RBC, UA >100 (*)     Hyaline Casts, UA 3 (*)     All other components within normal limits    Narrative:     Preferred Collection Type->Urine, Clean Catch  Specimen Source->Urine   CULTURE, URINE   MRSA SCREEN BY PCR   SARS-COV-2 RNA AMPLIFICATION, QUAL   TYPE & SCREEN   PREPARE RBC SOFT       No orders to display       ASSESSMENT & PLAN:   Symptomatic anemia:  Likely from hematuria  Admit to [facility]  Transfuse 1 unit PRBC; repeat CBC  Serial h/h      Hematuria:  Consult Urology  CBI per urology  NPO after midnight for possible scope  IV Rocephin- pt has pcn allergy but has taken Rocephin in the past  with no problems.  Urine culture pending      Acute on CKD stage III:  Possibly due to anemia   Treatment as above  Follow BUN/Creat    Hyperkalemia:  K+ level at 5.3  Possibly due to DARCI  Hold home potassium supplement  Follow level  Continuous cardiac monitoring    DVT Prophylaxis: Mechanical DVT prophylaxis and will be advised to be as mobile as possible and sit in a chair as tolerated.   ________________________________________________________________________________    Discharge Planning and Disposition: No mobility needs. Ambulating well. Patient will be discharged in 2-3 days  Face-to-Face encounter date: 06/14/2020  Encounter included review of the medical records, interviewing and examining the patient face-to-face, discussion with family and other health care providers including emergency medicine physician, admission orders, interpreting lab/test results and formulating a plan of care.   Medical Decision Making during this encounter was  [_] Low Complexity  [_] Moderate Complexity  [x] High Complexity  _________________________________________________________________________________    INPATIENT LIST OF MEDICATIONS     Current Facility-Administered Medications:     0.9%  NaCl infusion (for blood administration), , Intravenous, Q24H PRN, Jacob Shields MD    acetaminophen tablet 650 mg, 650 mg, Oral, Q4H PRN, Isabel De Leon NP    calcitRIOL capsule 0.5 mcg, 0.5 mcg, Oral, Daily, Isabel De Leon NP    cefTRIAXone (ROCEPHIN) 1 g/50 mL D5W IVPB, 1 g, Intravenous, Q24H, Isabel De Leon NP    cholestyramine-aspartame 4 gram packet 4 g, 4 g, Oral, BID, Isabel De Leon NP    dicyclomine capsule 10 mg, 10 mg, Oral, TID, Isabel De Leon NP    diltiaZEM 24 hr capsule 120 mg, 120 mg, Oral, Daily, Isabel De Leon NP    ferrous sulfate tablet 325 mg, 325 mg, Oral, Daily, Isabel De Leon NP    [START ON 6/15/2020] levothyroxine tablet 88 mcg, 88 mcg, Oral, Before breakfast, Isabel  MARY De Leon    magnesium oxide tablet 800 mg, 800 mg, Oral, Daily, Isabel De Leon NP    mirtazapine tablet 15 mg, 15 mg, Oral, QHS, Isabel De Leon NP    ondansetron injection 4 mg, 4 mg, Intravenous, Q6H PRN, Isabel De Leon NP    oxybutynin 24 hr tablet 10 mg, 10 mg, Oral, Daily, Isabel De Leon NP    sodium bicarbonate tablet 650 mg, 650 mg, Oral, BID, Isabel De Leon NP    sodium chloride 0.9% flush 10 mL, 10 mL, Intravenous, PRN, Isabel De Leon NP    traMADoL tablet 50 mg, 50 mg, Oral, Q4H PRN, Isabel De Leon NP    Current Outpatient Medications:     calcitRIOL (ROCALTROL) 0.5 MCG Cap, Take 0.5 mcg by mouth once daily., Disp: , Rfl:     cholestyramine-aspartame (QUESTRAN LIGHT) 4 gram PwPk, Take 1 packet (4 g total) by mouth 2 (two) times daily. for 21 days, Disp: 42 packet, Rfl: 0    dicyclomine (BENTYL) 10 MG capsule, Take 10 mg by mouth 3 (three) times daily., Disp: , Rfl:     diltiaZEM (CARDIZEM CD) 120 MG Cp24, Take 120 mg by mouth once daily., Disp: , Rfl:     ferrous sulfate 324 mg (65 mg iron) TbEC, Take 325 mg by mouth once daily., Disp: , Rfl:     levothyroxine (SYNTHROID) 88 MCG tablet, Take 1 tablet (88 mcg total) by mouth before breakfast., Disp: 30 tablet, Rfl: 11    magnesium oxide (MAG-OX) 400 mg (241.3 mg magnesium) tablet, Take 800 mg by mouth once daily., Disp: , Rfl:     mirtazapine (REMERON) 15 MG tablet, Take 1 tablet (15 mg total) by mouth every evening., Disp: 30 tablet, Rfl: 11    oxybutynin (DITROPAN-XL) 10 MG 24 hr tablet, Take 1 tablet (10 mg total) by mouth once daily., Disp: 30 tablet, Rfl: 0    potassium chloride (KLOR-CON ORAL), Take 10 mEq by mouth 2 (two) times a day., Disp: , Rfl:     promethazine (PHENERGAN) 25 MG tablet, Take 25 mg by mouth 2 (two) times daily as needed for Nausea., Disp: , Rfl:     sodium bicarbonate 325 MG tablet, Take 650 mg by mouth 2 (two) times daily. , Disp: , Rfl:     traMADoL (ULTRAM) 50 mg tablet, Take  1 tablet (50 mg total) by mouth every 4 (four) hours as needed for Pain., Disp: 42 tablet, Rfl: 0    LORazepam (ATIVAN) 0.5 MG tablet, Take 1 tablet (0.5 mg total) by mouth every 12 (twelve) hours as needed for Anxiety. (Patient not taking: Reported on 6/2/2020), Disp: 8 tablet, Rfl: 0    nitrofurantoin (MACRODANTIN) 100 MG capsule, Take 1 capsule (100 mg total) by mouth every 12 (twelve) hours., Disp: 14 capsule, Rfl: 0      Scheduled Meds:   calcitRIOL  0.5 mcg Oral Daily    cefTRIAXone (ROCEPHIN) IVPB  1 g Intravenous Q24H    cholestyramine-aspartame  4 g Oral BID    dicyclomine  10 mg Oral TID    diltiaZEM  120 mg Oral Daily    ferrous sulfate  325 mg Oral Daily    [START ON 6/15/2020] levothyroxine  88 mcg Oral Before breakfast    magnesium oxide  800 mg Oral Daily    mirtazapine  15 mg Oral QHS    oxybutynin  10 mg Oral Daily    sodium bicarbonate  650 mg Oral BID     Continuous Infusions:  PRN Meds:.sodium chloride, acetaminophen, ondansetron, sodium chloride 0.9%, traMADoL      Isabel De Leon  [facility] Hospitalist  06/14/2020

## 2020-06-14 NOTE — ED PROVIDER NOTES
Encounter Date: 6/14/2020       History     Chief Complaint   Patient presents with    Hematuria     X 4 DAYS, FEELING WEAK NOW    Abdominal Pain     @ OSTOMY SITE     70-year-old female with a history CKD 3, DM, HTN, iron deficiency anemia, vulvar cancer status post radiation (in remission), chronic indwelling catheter presents to the ER with generalized weakness and blood in urine.  Patient states that she has had an indwelling Godwin catheter since March secondary to radiation therapy to her groin area.  This Wednesday, her urine became grossly bloody. She actually saw her urologist Dr. Barber that Wednesday, and he told her to come to the ER if it persisted.  Since then, it has continued to be bloody and now she feels weak all over.  Endorses some vague pain to her lower abdomen.  Nausea but no vomiting.  Denies any change or decrease in urination.  Denies fever, chest pain, shortness of breath, or changes in bowel function.  Patient is not on a blood thinner.        Review of patient's allergies indicates:   Allergen Reactions    Ciprofloxacin Other (See Comments)     Elevated liver enzymes      Pcn [penicillins] Anaphylaxis     Past Medical History:   Diagnosis Date    Chronic kidney disease, stage III (moderate) 1/7/2018    Diabetes mellitus, type 2     Fatigue     Hypertension     Iron deficiency anemia due to chronic blood loss 1/7/2018    Vulvar cancer 1/7/2019     Past Surgical History:   Procedure Laterality Date    ABDOMINAL SURGERY      AV FISTULA PLACEMENT Left 2018    BACK SURGERY      carpel tunnel surgery once on each hand      CERVICAL FUSION      x 4    cervix repair      CHOLECYSTECTOMY  2000    DIAGNOSTIC LAPAROSCOPY N/A 2/13/2020    Procedure: LAPAROSCOPY, DIAGNOSTIC;  Surgeon: Robbi Lovell III, MD;  Location: [facility];  Service: General;  Laterality: N/A;    HYSTERECTOMY  1980    ILEOSTOMY N/A 2/13/2020    Procedure: CREATION, ILEOSTOMY Loop;  Surgeon: Robbi LEE  Nas MANZANARES MD;  Location: [facility];  Service: General;  Laterality: N/A;    ILEOSTOMY CLOSURE N/A 2020    Procedure: CLOSURE, ILEOSTOMY;  Surgeon: Robbi Lovell III, MD;  Location: [facility] OR;  Service: General;  Laterality: N/A;    LUMBAR LAMINECTOMY      LYSIS OF ADHESIONS N/A 2020    Procedure: LYSIS, ADHESIONS;  Surgeon: Robbi Lovell III, MD;  Location: [facility] OR;  Service: General;  Laterality: N/A;    NECK SURGERY      PHLEBOGRAPHY  2020    Procedure: VENOGRAM;  Surgeon: Robbi Lovell III, MD;  Location: [facility] OR;  Service: General;;    REMOVAL OF VASCULAR ACCESS PORT Right 2020    Procedure: REMOVAL, VASCULAR ACCESS PORT;  Surgeon: Robbi Lovell III, MD;  Location: [facility];  Service: General;  Laterality: Right;    SHOULDER SURGERY      vulva cancer       Family History   Problem Relation Age of Onset    Heart disease Mother     Hypertension Mother     Cancer Mother         lung    Heart disease Father     Heart disease Sister         Open heart surgery    No Known Problems Daughter      Social History     Tobacco Use    Smoking status: Former Smoker     Packs/day: 1.00     Years: 33.00     Pack years: 33.00     Types: Cigarettes     Quit date: 2000     Years since quittin.9    Smokeless tobacco: Former User     Quit date:    Substance Use Topics    Alcohol use: No    Drug use: No     Review of Systems   Constitutional: Negative for fever.   HENT: Negative for sore throat.    Respiratory: Negative for shortness of breath.    Cardiovascular: Negative for chest pain.   Gastrointestinal: Positive for abdominal pain and nausea. Negative for vomiting.   Genitourinary: Positive for hematuria. Negative for decreased urine volume, difficulty urinating and dysuria.   Musculoskeletal: Negative for back pain.   Skin: Positive for wound. Negative for rash.   Neurological: Positive for weakness.   Hematological: Does not bruise/bleed easily.   All other  systems reviewed and are negative.      Physical Exam     Initial Vitals [06/14/20 1229]   BP Pulse Resp Temp SpO2   (!) 153/69 83 (!) 22 97.8 °F (36.6 °C) 98 %      MAP       --         Physical Exam    Constitutional: She appears well-developed and well-nourished. No distress.   HENT:   Head: Normocephalic and atraumatic.   Mouth/Throat: Oropharynx is clear and moist.   Eyes: Conjunctivae and EOM are normal. Pupils are equal, round, and reactive to light.   Neck: Normal range of motion.   Cardiovascular: Normal rate, regular rhythm, normal heart sounds and intact distal pulses.   No murmur heard.  Pulmonary/Chest: Breath sounds normal. No respiratory distress. She has no wheezes. She has no rhonchi. She has no rales.   Abdominal: Soft. Bowel sounds are normal. She exhibits no distension. There is abdominal tenderness in the suprapubic area. There is no rigidity, no rebound, no guarding and no CVA tenderness.       Musculoskeletal: Normal range of motion. No tenderness or edema.   Neurological: She is alert.   Skin: Skin is warm and dry.   Psychiatric: She has a normal mood and affect. Thought content normal.         ED Course   Procedures  Labs Reviewed   URINALYSIS, REFLEX TO URINE CULTURE - Abnormal; Notable for the following components:       Result Value    Color, UA Red (*)     Appearance, UA Cloudy (*)     All other components within normal limits    Narrative:     Preferred Collection Type->Urine, Clean Catch  Specimen Source->Urine   CBC W/ AUTO DIFFERENTIAL - Abnormal; Notable for the following components:    RBC 2.41 (*)     Hemoglobin 7.6 (*)     Hematocrit 24.2 (*)     Mean Corpuscular Volume 100 (*)     Mean Corpuscular Hemoglobin 31.5 (*)     Mean Corpuscular Hemoglobin Conc 31.4 (*)     RDW 16.0 (*)     Lymph # 0.8 (*)     Gran% 82.8 (*)     Lymph% 11.6 (*)     Mono% 3.9 (*)     All other components within normal limits   COMPREHENSIVE METABOLIC PANEL - Abnormal; Notable for the following  components:    Potassium 5.3 (*)     Chloride 111 (*)     CO2 21 (*)     Glucose 133 (*)     BUN, Bld 29 (*)     Creatinine 2.1 (*)     Calcium 8.2 (*)     ALT 9 (*)     Anion Gap 5 (*)     eGFR if  26.9 (*)     eGFR if non  23.3 (*)     All other components within normal limits   PROTIME-INR - Abnormal; Notable for the following components:    PT 15.5 (*)     All other components within normal limits   URINALYSIS MICROSCOPIC - Abnormal; Notable for the following components:    RBC, UA >100 (*)     Hyaline Casts, UA 3 (*)     All other components within normal limits    Narrative:     Preferred Collection Type->Urine, Clean Catch  Specimen Source->Urine   SARS-COV-2 RNA AMPLIFICATION, QUAL   TYPE & SCREEN   PREPARE RBC SOFT          Imaging Results    None          Medical Decision Making:   Initial Assessment:   70-year-old female with a history CKD 3, DM, HTN, iron deficiency anemia, vulvar cancer status post radiation (in remission), chronic indwelling catheter presents to the ER with generalized weakness and blood in urine.   ED Management:  Plan:  Afebrile, vital signs stable.  Lab showed WBC 6.6.  H&H 7.6/24.2, down from a hemoglobin of 9.3 earlier this month.  .  Potassium 5.3.  BUN 29, creatinine 2.1, similar to previous.  INR 1.3.  UA shows rbc's, no wbc's or bacteria.  Rapid COVID negative.  Note from Dr. Barber shows that he planned outpatient cystoscopy.  She was started on Macrobid empirically.  At this time, patient appears to have symptomatic anemia secondary to gross hematuria.  Will transfuse a unit of blood.  Will discuss with urology.  Plan to admit to the hospitalist further management.    Discussed with Dr. Campos. Recommends bladder irrigation. Dr. Barber will perform cystoscope as inpatient. Hospitalist notified.                                 Clinical Impression:       ICD-10-CM ICD-9-CM   1. Symptomatic anemia  D64.9 285.9   2. Gross hematuria   R31.0 599.71             ED Disposition Condition    Admit                           Jacob Shields MD  06/14/20 150

## 2020-06-15 ENCOUNTER — TELEPHONE (OUTPATIENT)
Dept: UROLOGY | Facility: CLINIC | Age: 70
End: 2020-06-15

## 2020-06-15 ENCOUNTER — ANESTHESIA EVENT (OUTPATIENT)
Dept: SURGERY | Facility: HOSPITAL | Age: 70
DRG: 669 | End: 2020-06-15
Payer: MEDICARE

## 2020-06-15 LAB
ALBUMIN SERPL BCP-MCNC: 2.9 G/DL (ref 3.5–5.2)
ALP SERPL-CCNC: 99 U/L (ref 55–135)
ALT SERPL W/O P-5'-P-CCNC: 6 U/L (ref 10–44)
ANION GAP SERPL CALC-SCNC: 6 MMOL/L (ref 8–16)
AST SERPL-CCNC: 10 U/L (ref 10–40)
BASOPHILS # BLD AUTO: 0.02 K/UL (ref 0–0.2)
BASOPHILS NFR BLD: 0.3 % (ref 0–1.9)
BILIRUB SERPL-MCNC: 0.8 MG/DL (ref 0.1–1)
BUN SERPL-MCNC: 29 MG/DL (ref 8–23)
CALCIUM SERPL-MCNC: 8.1 MG/DL (ref 8.7–10.5)
CHLORIDE SERPL-SCNC: 112 MMOL/L (ref 95–110)
CO2 SERPL-SCNC: 20 MMOL/L (ref 23–29)
CREAT SERPL-MCNC: 2.1 MG/DL (ref 0.5–1.4)
DIFFERENTIAL METHOD: ABNORMAL
EOSINOPHIL # BLD AUTO: 0.2 K/UL (ref 0–0.5)
EOSINOPHIL NFR BLD: 3.1 % (ref 0–8)
ERYTHROCYTE [DISTWIDTH] IN BLOOD BY AUTOMATED COUNT: 16.5 % (ref 11.5–14.5)
EST. GFR  (AFRICAN AMERICAN): 26.9 ML/MIN/1.73 M^2
EST. GFR  (NON AFRICAN AMERICAN): 23.3 ML/MIN/1.73 M^2
GLUCOSE SERPL-MCNC: 71 MG/DL (ref 70–110)
HCT VFR BLD AUTO: 24.3 % (ref 37–48.5)
HCT VFR BLD AUTO: 25.1 % (ref 37–48.5)
HCT VFR BLD AUTO: 28.8 % (ref 37–48.5)
HGB BLD-MCNC: 7.8 G/DL (ref 12–16)
HGB BLD-MCNC: 8.2 G/DL (ref 12–16)
HGB BLD-MCNC: 9.2 G/DL (ref 12–16)
IMM GRANULOCYTES # BLD AUTO: 0.02 K/UL (ref 0–0.04)
IMM GRANULOCYTES NFR BLD AUTO: 0.3 % (ref 0–0.5)
LYMPHOCYTES # BLD AUTO: 1.7 K/UL (ref 1–4.8)
LYMPHOCYTES NFR BLD: 25.4 % (ref 18–48)
MAGNESIUM SERPL-MCNC: 1.7 MG/DL (ref 1.6–2.6)
MCH RBC QN AUTO: 31.3 PG (ref 27–31)
MCHC RBC AUTO-ENTMCNC: 32.1 G/DL (ref 32–36)
MCV RBC AUTO: 98 FL (ref 82–98)
MONOCYTES # BLD AUTO: 0.5 K/UL (ref 0.3–1)
MONOCYTES NFR BLD: 7.8 % (ref 4–15)
NEUTROPHILS # BLD AUTO: 4.1 K/UL (ref 1.8–7.7)
NEUTROPHILS NFR BLD: 63.1 % (ref 38–73)
NRBC BLD-RTO: 0 /100 WBC
PLATELET # BLD AUTO: 225 K/UL (ref 150–350)
PMV BLD AUTO: 9.4 FL (ref 9.2–12.9)
POTASSIUM SERPL-SCNC: 5.7 MMOL/L (ref 3.5–5.1)
PROT SERPL-MCNC: 5.5 G/DL (ref 6–8.4)
RBC # BLD AUTO: 2.49 M/UL (ref 4–5.4)
SODIUM SERPL-SCNC: 138 MMOL/L (ref 136–145)
WBC # BLD AUTO: 6.53 K/UL (ref 3.9–12.7)

## 2020-06-15 PROCEDURE — 93005 ELECTROCARDIOGRAM TRACING: CPT | Performed by: INTERNAL MEDICINE

## 2020-06-15 PROCEDURE — 85018 HEMOGLOBIN: CPT

## 2020-06-15 PROCEDURE — 36415 COLL VENOUS BLD VENIPUNCTURE: CPT

## 2020-06-15 PROCEDURE — 83735 ASSAY OF MAGNESIUM: CPT

## 2020-06-15 PROCEDURE — 85014 HEMATOCRIT: CPT | Mod: 91

## 2020-06-15 PROCEDURE — 85025 COMPLETE CBC W/AUTO DIFF WBC: CPT

## 2020-06-15 PROCEDURE — 80053 COMPREHEN METABOLIC PANEL: CPT

## 2020-06-15 PROCEDURE — 12000002 HC ACUTE/MED SURGE SEMI-PRIVATE ROOM

## 2020-06-15 PROCEDURE — 63600175 PHARM REV CODE 636 W HCPCS: Performed by: NURSE PRACTITIONER

## 2020-06-15 PROCEDURE — 25000003 PHARM REV CODE 250: Performed by: NURSE PRACTITIONER

## 2020-06-15 RX ADMIN — CALCITRIOL CAPSULES 0.25 MCG 0.5 MCG: 0.25 CAPSULE ORAL at 02:06

## 2020-06-15 RX ADMIN — OXYBUTYNIN CHLORIDE 10 MG: 10 TABLET, EXTENDED RELEASE ORAL at 02:06

## 2020-06-15 RX ADMIN — MAGNESIUM OXIDE 800 MG: 400 TABLET ORAL at 02:06

## 2020-06-15 RX ADMIN — SODIUM BICARBONATE 650 MG TABLET 650 MG: at 08:06

## 2020-06-15 RX ADMIN — CEFTRIAXONE SODIUM 1 G: 1 INJECTION, POWDER, FOR SOLUTION INTRAMUSCULAR; INTRAVENOUS at 04:06

## 2020-06-15 RX ADMIN — TRAMADOL HYDROCHLORIDE 50 MG: 50 TABLET, FILM COATED ORAL at 07:06

## 2020-06-15 RX ADMIN — DICYCLOMINE HYDROCHLORIDE 10 MG: 10 CAPSULE ORAL at 08:06

## 2020-06-15 RX ADMIN — CHOLESTYRAMINE 4 G: 4 POWDER, FOR SUSPENSION ORAL at 08:06

## 2020-06-15 RX ADMIN — TRAMADOL HYDROCHLORIDE 50 MG: 50 TABLET, FILM COATED ORAL at 01:06

## 2020-06-15 RX ADMIN — SODIUM BICARBONATE 650 MG TABLET 650 MG: at 02:06

## 2020-06-15 RX ADMIN — TRAMADOL HYDROCHLORIDE 50 MG: 50 TABLET, FILM COATED ORAL at 08:06

## 2020-06-15 RX ADMIN — ONDANSETRON 4 MG: 2 INJECTION INTRAMUSCULAR; INTRAVENOUS at 09:06

## 2020-06-15 RX ADMIN — MIRTAZAPINE 15 MG: 15 TABLET, FILM COATED ORAL at 08:06

## 2020-06-15 RX ADMIN — DILTIAZEM HYDROCHLORIDE 120 MG: 120 CAPSULE, COATED, EXTENDED RELEASE ORAL at 02:06

## 2020-06-15 RX ADMIN — TRAMADOL HYDROCHLORIDE 50 MG: 50 TABLET, FILM COATED ORAL at 02:06

## 2020-06-15 RX ADMIN — FERROUS SULFATE TAB 325 MG (65 MG ELEMENTAL FE) 325 MG: 325 (65 FE) TAB at 02:06

## 2020-06-15 RX ADMIN — DICYCLOMINE HYDROCHLORIDE 10 MG: 10 CAPSULE ORAL at 02:06

## 2020-06-15 NOTE — TELEPHONE ENCOUNTER
Spoke with Gaby at [facility] she states  documented in note  will come in today and do in patient cystoscopy on patient for hematuria. Spoke with  he states he is not in the office today and he spoke with Rebekah whom stated he will see the patient later today. Gaby at [facility] informed.

## 2020-06-15 NOTE — CONSULTS
This patient is a 70-year-old female with gross hematuria  She has anemia due to chronic blood loss  And vulvar cancer  She is a patient of Dr. Barber, the urologist at [facility]  She continues to have persistent gross hematuria  She has a 3 way continuous bladder irrigation catheter  Irrigation is going and appears that she has dark blood-tinged urine  Indicating no active bleeding at this time    Going to let Dr. Barber know that she is here  He may wish to do her cystoscopy this admission or he may plan to do it later in the week as an outpatient    Continue CBI at this point

## 2020-06-15 NOTE — TELEPHONE ENCOUNTER
----- Message from Valery Mckeon sent at 6/15/2020 11:22 AM CDT -----  Regarding: Gaby barraza/ Dionne Hathaawy  Type: Needs medical advice      Who Called:  Gaby Haynes Call Back Number:   [phone]  Additional Information:   Requesting a call back. Need to know if Dr. Barber will be in to do pt procedure today. Pt is at the hospital now. Please advise

## 2020-06-15 NOTE — PROGRESS NOTES
Replaced by [facility]  Progress Note    Patient Name: Shara Hutchins  MRN: [id number]  Patient Class: IP- Inpatient   Admission Date: 6/14/2020  Length of Stay: 1 days  Attending Physician: No att. providers found  Primary Care Provider: April Horton MD        Subjective:     Principal Problem:<principal problem not specified>        HPI:  Shara Hutchins is a 70 y.o. White female who  has a past medical history of Chronic kidney disease, stage III (moderate) (1/7/2018), Diabetes mellitus, type 2, Fatigue, Hypertension, Iron deficiency anemia due to chronic blood loss (1/7/2018), and Vulvar cancer (1/7/2019).. The patient presented to [facility] on 6/14/2020 with a primary complaint of Hematuria (X 4 DAYS, FEELING WEAK NOW) and Abdominal Pain (@ OSTOMY SITE)    History was obtained from the patient and ER physician Sign-out. Patient presents to the ED with the complaint of hematuria x 4 days and she is now feeling weak. She has a chronic indwelling catheter since March. She is followed by Urology. She saw urology 4 days ago for onset of gross bloody urine. She was started on Macrobid and pyridium at that time for UTI. She reports that hematuria persisted and she now feels weak. She also endorses mild pain to lower abdomen. She denies fever, chills, chest pain, sob, rectal bleeding, melena, numbness, tingling, or LOC.      In the ED, VSS.  CBC with h/h 7.6/24.2 which is decreased from few days prior. CMP with K+ 5.3 and worsening kidney function. Urology was consulted who recommended the patient be started on CBI.      Decision to admit was taken and patient was informed about the plan of care.     Overview/Hospital Course:  6/15/2020  Ms Hutchins has a triple lumen catheter for bladder irrigation per urology. She denies pain, fever chills, nausea or vomiting.  She has a lactose fermenting GNR in her urine  LAST urine culture 3/2020  Klebsiella pneumoniae       CULTURE, URINE     Amox/K  Clav'ate <=8/4 mcg/mL Sensitive     Amp/Sulbactam >16/8 mcg/mL Resistant     Cefazolin <=8 mcg/mL Sensitive     Cefepime <=4 mcg/mL Sensitive     Ceftriaxone <=8 mcg/mL Sensitive     Ciprofloxacin <=1 mcg/mL Sensitive     Ertapenem <=2 mcg/mL Sensitive     Gentamicin <=4 mcg/mL Sensitive     Levofloxacin <=2 mcg/mL Sensitive     Meropenem <=1 mcg/mL Sensitive     Nitrofurantoin 64 mcg/mL Intermediate     Piperacillin/Tazo <=16 mcg/mL Sensitive     Tetracycline <=4 mcg/mL Sensitive     Tobramycin <=4 mcg/mL Sensitive     Trimeth/Sulfa <=2/38 mcg/mL Sensitive                  Interval History: Pt continues to have hematuria and complaints consistent with symptomatic anemia    Review of Systems   Constitutional: Positive for diaphoresis and fatigue. Negative for chills and fever.   HENT: Negative.    Eyes: Negative.    Respiratory: Positive for shortness of breath.         Dolan   Cardiovascular: Positive for palpitations.   Gastrointestinal: Negative.    Genitourinary: Positive for hematuria.   Musculoskeletal: Positive for arthralgias.   Skin: Negative.    Neurological: Positive for dizziness, weakness and light-headedness.   Hematological: Negative.    Psychiatric/Behavioral: Negative.      Objective:     Vital Signs (Most Recent):  Temp: 98.6 °F (37 °C) (06/15/20 1120)  Pulse: 76 (06/15/20 1120)  Resp: 19 (06/15/20 1120)  BP: 135/68 (06/15/20 1120)  SpO2: 99 % (06/15/20 1120) Vital Signs (24h Range):  Temp:  [98 °F (36.7 °C)-99.3 °F (37.4 °C)] 98.6 °F (37 °C)  Pulse:  [67-78] 76  Resp:  [16-26] 19  SpO2:  [99 %-100 %] 99 %  BP: (114-165)/(58-74) 135/68     Weight: 56.7 kg (125 lb)  Body mass index is 19.58 kg/m².    Intake/Output Summary (Last 24 hours) at 6/15/2020 1253  Last data filed at 6/15/2020 1130  Gross per 24 hour   Intake 2060.5 ml   Output 2600 ml   Net -539.5 ml      Physical Exam  HENT:      Head: Normocephalic and atraumatic.      Nose: Nose normal.   Eyes:      Extraocular Movements: Extraocular  movements intact.      Pupils: Pupils are equal, round, and reactive to light.      Comments: Pale conjunctiva   Neck:      Musculoskeletal: Normal range of motion and neck supple.   Cardiovascular:      Rate and Rhythm: Normal rate and regular rhythm.      Pulses: Normal pulses.      Heart sounds: Normal heart sounds.   Pulmonary:      Effort: Pulmonary effort is normal.      Breath sounds: Normal breath sounds.   Abdominal:      General: Abdomen is flat. Bowel sounds are normal.      Palpations: Abdomen is soft.   Musculoskeletal: Normal range of motion.   Skin:     General: Skin is warm and dry.   Neurological:      General: No focal deficit present.      Mental Status: She is alert and oriented to person, place, and time.   Psychiatric:         Mood and Affect: Mood normal.         Behavior: Behavior normal.         Significant Labs:   ABGs: No results for input(s): PH, PCO2, HCO3, POCSATURATED, BE, TOTALHB, COHB, METHB, O2HB, POCFIO2 in the last 48 hours.  Bilirubin:   Recent Labs   Lab 05/22/20  0150 05/23/20  0502 06/03/20  1429 06/14/20  1245 06/15/20  0148   BILITOT 1.2* 0.9 0.4 0.6 0.8     BMP:   Recent Labs   Lab 06/15/20  0148   GLU 71      K 5.7*   *   CO2 20*   BUN 29*   CREATININE 2.1*   CALCIUM 8.1*   MG 1.7     CBC:   Recent Labs   Lab 06/14/20  1245 06/14/20  2202 06/15/20  0148 06/15/20  0804   WBC 6.65  --  6.53  --    HGB 7.6* 7.7* 7.8* 9.2*   HCT 24.2* 23.7* 24.3* 28.8*     --  225  --      CMP:   Recent Labs   Lab 06/14/20  1245 06/15/20  0148    138   K 5.3* 5.7*   * 112*   CO2 21* 20*   * 71   BUN 29* 29*   CREATININE 2.1* 2.1*   CALCIUM 8.2* 8.1*   PROT 6.4 5.5*   ALBUMIN 3.5 2.9*   BILITOT 0.6 0.8   ALKPHOS 122 99   AST 12 10   ALT 9* 6*   ANIONGAP 5* 6*   EGFRNONAA 23.3* 23.3*     Cardiac Markers: No results for input(s): CKMB, MYOGLOBIN, BNP, TROPISTAT in the last 48 hours.  Coagulation:   Recent Labs   Lab 06/14/20  1245   INR 1.3     Lactic Acid:  No results for input(s): LACTATE in the last 48 hours.  Lipase: No results for input(s): LIPASE in the last 48 hours.  Lipid Panel: No results for input(s): CHOL, HDL, LDLCALC, TRIG, CHOLHDL in the last 48 hours.  Magnesium:   Recent Labs   Lab 06/15/20  0148   MG 1.7     POCT Glucose: No results for input(s): POCTGLUCOSE in the last 48 hours.  Troponin:   Recent Labs   Lab 06/14/20  1245   TROPONINI <0.030     Urine Culture:   Recent Labs   Lab 06/14/20  1651   LABURIN GRAM NEGATIVE CRISTHIAN, LACTOSE   >100,000 cfu/ml  Identification and susceptibility pending  *     Urine Studies:   Recent Labs   Lab 06/14/20  1305   COLORU Red*   APPEARANCEUA Cloudy*   PHUR 7.0   SPECGRAV 1.015   PROTEINUA SEE COMMENT   GLUCUA SEE COMMENT   KETONESU SEE COMMENT   BILIRUBINUA SEE COMMENT   OCCULTUA SEE COMMENT   NITRITE SEE COMMENT   UROBILINOGEN SEE COMMENT   LEUKOCYTESUR SEE COMMENT   RBCUA >100*   WBCUA 0   BACTERIA Negative   SQUAMEPITHEL 5   HYALINECASTS 3*       Significant Imaging: I have reviewed all pertinent imaging results/findings within the past 24 hours.      Assessment/Plan:    Symptomatic anemia:  Likely from hematuria  Admit to Med-tele  Transfuse 1 unit PRBC; repeat CBC  Serial h/h    UTI  100K colonies of a lactose fermenting GNR    A)  Previous culture Klebsiella sensitive to ceftriaxone  P)  Pt is receiving rocephin and we are awaiting the antibiogram results        Hematuria:  Consult Urology  CBI per urology  NPO after midnight for possible scope  IV Rocephin- pt has pcn allergy but has taken Rocephin in the past with no problems.  Urine culture pending  6/15/2020  Triple lumen cathoter bladder irrigation per         Acute on CKD stage III:  Possibly due to anemia   Treatment as above  Follow BUN/Creat     Hyperkalemia:  K+ level at 5.3  Possibly due to DARCI  Hold home potassium supplement  Follow level  Continuous cardiac monitoring     DVT Prophylaxis: Mechanical DVT prophylaxis and will be advised to  be as mobile as possible and sit in a chair as tolerated.   No notes have been filed under this hospital service.  Service: Hospital Medicine    VTE Risk Mitigation (From admission, onward)         Ordered     Place JA hose  Until discontinued      06/14/20 1511     Reason for No Pharmacological VTE Prophylaxis  Once     Question:  Reasons:  Answer:  Active Bleeding    06/14/20 1511     IP VTE HIGH RISK PATIENT  Once      06/14/20 1511                      Ashwin Diaz MD  Department of Hospital Medicine   Formerly [facility]

## 2020-06-15 NOTE — PLAN OF CARE
Problem: Wound  Goal: Optimal Wound Healing  Outcome: Ongoing, Progressing     Problem: Adult Inpatient Plan of Care  Goal: Plan of Care Review  Outcome: Ongoing, Progressing     Problem: Adult Inpatient Plan of Care  Goal: Patient-Specific Goal (Individualization)  Outcome: Ongoing, Progressing

## 2020-06-15 NOTE — PLAN OF CARE
Problem: Wound  Goal: Optimal Wound Healing  Outcome: Ongoing, Progressing     Problem: Infection  Goal: Infection Symptom Resolution  Outcome: Ongoing, Progressing     Problem: Adult Inpatient Plan of Care  Goal: Plan of Care Review  Outcome: Ongoing, Progressing  Goal: Patient-Specific Goal (Individualization)  Outcome: Ongoing, Progressing  Goal: Absence of Hospital-Acquired Illness or Injury  Outcome: Ongoing, Progressing  Goal: Optimal Comfort and Wellbeing  Outcome: Ongoing, Progressing  Goal: Readiness for Transition of Care  Outcome: Ongoing, Progressing  Goal: Rounds/Family Conference  Outcome: Ongoing, Progressing     Problem: Electrolyte Imbalance (Acute Kidney Injury/Impairment)  Goal: Serum Electrolyte Balance  Outcome: Ongoing, Progressing     Problem: Fluid Imbalance (Acute Kidney Injury/Impairment)  Goal: Optimal Fluid Balance  Outcome: Ongoing, Progressing     Problem: Hematologic Alteration (Acute Kidney Injury/Impairment)  Goal: Hemoglobin, Hematocrit and Platelets Within Normal Range  Outcome: Ongoing, Progressing     Problem: Oral Intake Inadequate (Acute Kidney Injury/Impairment)  Goal: Optimal Nutrition Intake  Outcome: Ongoing, Progressing     Problem: Renal Function Impairment (Acute Kidney Injury/Impairment)  Goal: Effective Renal Function  Outcome: Ongoing, Progressing     Problem: Fall Injury Risk  Goal: Absence of Fall and Fall-Related Injury  Outcome: Ongoing, Progressing     Problem: Skin Injury Risk Increased  Goal: Skin Health and Integrity  Outcome: Ongoing, Progressing

## 2020-06-15 NOTE — HPI
Shara Hutchins is a 70 y.o. White female who  has a past medical history of Chronic kidney disease, stage III (moderate) (1/7/2018), Diabetes mellitus, type 2, Fatigue, Hypertension, Iron deficiency anemia due to chronic blood loss (1/7/2018), and Vulvar cancer (1/7/2019).. The patient presented to [facility] on 6/14/2020 with a primary complaint of Hematuria (X 4 DAYS, FEELING WEAK NOW) and Abdominal Pain (@ OSTOMY SITE)    History was obtained from the patient and ER physician Sign-out. Patient presents to the ED with the complaint of hematuria x 4 days and she is now feeling weak. She has a chronic indwelling catheter since March. She is followed by Urology. She saw urology 4 days ago for onset of gross bloody urine. She was started on Macrobid and pyridium at that time for UTI. She reports that hematuria persisted and she now feels weak. She also endorses mild pain to lower abdomen. She denies fever, chills, chest pain, sob, rectal bleeding, melena, numbness, tingling, or LOC.      In the ED, VSS.  CBC with h/h 7.6/24.2 which is decreased from few days prior. CMP with K+ 5.3 and worsening kidney function. Urology was consulted who recommended the patient be started on CBI.      Decision to admit was taken and patient was informed about the plan of care.

## 2020-06-15 NOTE — SUBJECTIVE & OBJECTIVE
Interval History: Pt continues to have hematuria and complaints consistent with symptomatic anemia    Review of Systems   Constitutional: Positive for diaphoresis and fatigue. Negative for chills and fever.   HENT: Negative.    Eyes: Negative.    Respiratory: Positive for shortness of breath.         Dolan   Cardiovascular: Positive for palpitations.   Gastrointestinal: Negative.    Genitourinary: Positive for hematuria.   Musculoskeletal: Positive for arthralgias.   Skin: Negative.    Neurological: Positive for dizziness, weakness and light-headedness.   Hematological: Negative.    Psychiatric/Behavioral: Negative.      Objective:     Vital Signs (Most Recent):  Temp: 98.6 °F (37 °C) (06/15/20 1120)  Pulse: 76 (06/15/20 1120)  Resp: 19 (06/15/20 1120)  BP: 135/68 (06/15/20 1120)  SpO2: 99 % (06/15/20 1120) Vital Signs (24h Range):  Temp:  [98 °F (36.7 °C)-99.3 °F (37.4 °C)] 98.6 °F (37 °C)  Pulse:  [67-78] 76  Resp:  [16-26] 19  SpO2:  [99 %-100 %] 99 %  BP: (114-165)/(58-74) 135/68     Weight: 56.7 kg (125 lb)  Body mass index is 19.58 kg/m².    Intake/Output Summary (Last 24 hours) at 6/15/2020 1253  Last data filed at 6/15/2020 1130  Gross per 24 hour   Intake 2060.5 ml   Output 2600 ml   Net -539.5 ml      Physical Exam  HENT:      Head: Normocephalic and atraumatic.      Nose: Nose normal.   Eyes:      Extraocular Movements: Extraocular movements intact.      Pupils: Pupils are equal, round, and reactive to light.      Comments: Pale conjunctiva   Neck:      Musculoskeletal: Normal range of motion and neck supple.   Cardiovascular:      Rate and Rhythm: Normal rate and regular rhythm.      Pulses: Normal pulses.      Heart sounds: Normal heart sounds.   Pulmonary:      Effort: Pulmonary effort is normal.      Breath sounds: Normal breath sounds.   Abdominal:      General: Abdomen is flat. Bowel sounds are normal.      Palpations: Abdomen is soft.   Musculoskeletal: Normal range of motion.   Skin:     General:  Skin is warm and dry.   Neurological:      General: No focal deficit present.      Mental Status: She is alert and oriented to person, place, and time.   Psychiatric:         Mood and Affect: Mood normal.         Behavior: Behavior normal.         Significant Labs:   ABGs: No results for input(s): PH, PCO2, HCO3, POCSATURATED, BE, TOTALHB, COHB, METHB, O2HB, POCFIO2 in the last 48 hours.  Bilirubin:   Recent Labs   Lab 05/22/20  0150 05/23/20  0502 06/03/20  1429 06/14/20  1245 06/15/20  0148   BILITOT 1.2* 0.9 0.4 0.6 0.8     BMP:   Recent Labs   Lab 06/15/20  0148   GLU 71      K 5.7*   *   CO2 20*   BUN 29*   CREATININE 2.1*   CALCIUM 8.1*   MG 1.7     CBC:   Recent Labs   Lab 06/14/20  1245 06/14/20  2202 06/15/20  0148 06/15/20  0804   WBC 6.65  --  6.53  --    HGB 7.6* 7.7* 7.8* 9.2*   HCT 24.2* 23.7* 24.3* 28.8*     --  225  --      CMP:   Recent Labs   Lab 06/14/20  1245 06/15/20  0148    138   K 5.3* 5.7*   * 112*   CO2 21* 20*   * 71   BUN 29* 29*   CREATININE 2.1* 2.1*   CALCIUM 8.2* 8.1*   PROT 6.4 5.5*   ALBUMIN 3.5 2.9*   BILITOT 0.6 0.8   ALKPHOS 122 99   AST 12 10   ALT 9* 6*   ANIONGAP 5* 6*   EGFRNONAA 23.3* 23.3*     Cardiac Markers: No results for input(s): CKMB, MYOGLOBIN, BNP, TROPISTAT in the last 48 hours.  Coagulation:   Recent Labs   Lab 06/14/20  1245   INR 1.3     Lactic Acid: No results for input(s): LACTATE in the last 48 hours.  Lipase: No results for input(s): LIPASE in the last 48 hours.  Lipid Panel: No results for input(s): CHOL, HDL, LDLCALC, TRIG, CHOLHDL in the last 48 hours.  Magnesium:   Recent Labs   Lab 06/15/20  0148   MG 1.7     POCT Glucose: No results for input(s): POCTGLUCOSE in the last 48 hours.  Troponin:   Recent Labs   Lab 06/14/20  1245   TROPONINI <0.030     Urine Culture:   Recent Labs   Lab 06/14/20  1651   LABURIN GRAM NEGATIVE CRISTHIAN, LACTOSE   >100,000 cfu/ml  Identification and susceptibility pending  *     Urine  Studies:   Recent Labs   Lab 06/14/20  1305   COLORU Red*   APPEARANCEUA Cloudy*   PHUR 7.0   SPECGRAV 1.015   PROTEINUA SEE COMMENT   GLUCUA SEE COMMENT   KETONESU SEE COMMENT   BILIRUBINUA SEE COMMENT   OCCULTUA SEE COMMENT   NITRITE SEE COMMENT   UROBILINOGEN SEE COMMENT   LEUKOCYTESUR SEE COMMENT   RBCUA >100*   WBCUA 0   BACTERIA Negative   SQUAMEPITHEL 5   HYALINECASTS 3*       Significant Imaging: I have reviewed all pertinent imaging results/findings within the past 24 hours.

## 2020-06-15 NOTE — HOSPITAL COURSE
6/15/2020  Ms Hutchins has a triple lumen catheter for bladder irrigation per urology. She denies pain, fever chills, nausea or vomiting.  She has a lactose fermenting GNR in her urine  LAST urine culture 3/2020  Klebsiella pneumoniae       CULTURE, URINE     Amox/K Clav'ate <=8/4 mcg/mL Sensitive     Amp/Sulbactam >16/8 mcg/mL Resistant     Cefazolin <=8 mcg/mL Sensitive     Cefepime <=4 mcg/mL Sensitive     Ceftriaxone <=8 mcg/mL Sensitive     Ciprofloxacin <=1 mcg/mL Sensitive     Ertapenem <=2 mcg/mL Sensitive     Gentamicin <=4 mcg/mL Sensitive     Levofloxacin <=2 mcg/mL Sensitive     Meropenem <=1 mcg/mL Sensitive     Nitrofurantoin 64 mcg/mL Intermediate     Piperacillin/Tazo <=16 mcg/mL Sensitive     Tetracycline <=4 mcg/mL Sensitive     Tobramycin <=4 mcg/mL Sensitive     Trimeth/Sulfa <=2/38 mcg/mL Sensitive

## 2020-06-16 ENCOUNTER — ANESTHESIA (OUTPATIENT)
Dept: SURGERY | Facility: HOSPITAL | Age: 70
DRG: 669 | End: 2020-06-16
Payer: MEDICARE

## 2020-06-16 LAB
ANION GAP SERPL CALC-SCNC: 8 MMOL/L (ref 8–16)
BACTERIA UR CULT: ABNORMAL
BUN SERPL-MCNC: 30 MG/DL (ref 8–23)
CALCIUM SERPL-MCNC: 8.4 MG/DL (ref 8.7–10.5)
CHLORIDE SERPL-SCNC: 110 MMOL/L (ref 95–110)
CO2 SERPL-SCNC: 20 MMOL/L (ref 23–29)
CREAT SERPL-MCNC: 2 MG/DL (ref 0.5–1.4)
EST. GFR  (AFRICAN AMERICAN): 28.5 ML/MIN/1.73 M^2
EST. GFR  (NON AFRICAN AMERICAN): 24.8 ML/MIN/1.73 M^2
GLUCOSE SERPL-MCNC: 72 MG/DL (ref 70–110)
HCT VFR BLD AUTO: 25.5 % (ref 37–48.5)
HCT VFR BLD AUTO: 27 % (ref 37–48.5)
HCT VFR BLD AUTO: 27.2 % (ref 37–48.5)
HGB BLD-MCNC: 8.2 G/DL (ref 12–16)
HGB BLD-MCNC: 8.7 G/DL (ref 12–16)
HGB BLD-MCNC: 8.8 G/DL (ref 12–16)
POTASSIUM SERPL-SCNC: 5.5 MMOL/L (ref 3.5–5.1)
SODIUM SERPL-SCNC: 138 MMOL/L (ref 136–145)

## 2020-06-16 PROCEDURE — 85014 HEMATOCRIT: CPT | Mod: 91

## 2020-06-16 PROCEDURE — 63600175 PHARM REV CODE 636 W HCPCS: Performed by: NURSE ANESTHETIST, CERTIFIED REGISTERED

## 2020-06-16 PROCEDURE — 25000003 PHARM REV CODE 250: Performed by: FAMILY MEDICINE

## 2020-06-16 PROCEDURE — 74420 UROGRAPHY RTRGR +-KUB: CPT | Mod: 26,,, | Performed by: UROLOGY

## 2020-06-16 PROCEDURE — 71000039 HC RECOVERY, EACH ADD'L HOUR: Performed by: UROLOGY

## 2020-06-16 PROCEDURE — 36000706: Performed by: UROLOGY

## 2020-06-16 PROCEDURE — 36000707: Performed by: UROLOGY

## 2020-06-16 PROCEDURE — 25500020 PHARM REV CODE 255: Performed by: UROLOGY

## 2020-06-16 PROCEDURE — 52235 PR CYSTOURETHROSCOPY,FULGUR 2-5 CM LESN: ICD-10-PCS | Mod: ,,, | Performed by: UROLOGY

## 2020-06-16 PROCEDURE — 88112 CYTOPATH CELL ENHANCE TECH: CPT | Mod: TC

## 2020-06-16 PROCEDURE — 27201107 HC STYLET, STANDARD: Performed by: ANESTHESIOLOGY

## 2020-06-16 PROCEDURE — 12000002 HC ACUTE/MED SURGE SEMI-PRIVATE ROOM

## 2020-06-16 PROCEDURE — 63600175 PHARM REV CODE 636 W HCPCS: Performed by: ANESTHESIOLOGY

## 2020-06-16 PROCEDURE — 63600175 PHARM REV CODE 636 W HCPCS: Performed by: NURSE PRACTITIONER

## 2020-06-16 PROCEDURE — 27000080 OPTIME MED/SURG SUP & DEVICES GENERAL CLASSIFICATION: Performed by: UROLOGY

## 2020-06-16 PROCEDURE — 80048 BASIC METABOLIC PNL TOTAL CA: CPT

## 2020-06-16 PROCEDURE — 27202105 HC BIS BILATERAL SENSOR: Performed by: ANESTHESIOLOGY

## 2020-06-16 PROCEDURE — 85018 HEMOGLOBIN: CPT

## 2020-06-16 PROCEDURE — 25000003 PHARM REV CODE 250: Performed by: NURSE ANESTHETIST, CERTIFIED REGISTERED

## 2020-06-16 PROCEDURE — 27000671 HC TUBING MICROBORE EXT: Performed by: ANESTHESIOLOGY

## 2020-06-16 PROCEDURE — 37000008 HC ANESTHESIA 1ST 15 MINUTES: Performed by: UROLOGY

## 2020-06-16 PROCEDURE — 52235 CYSTOSCOPY AND TREATMENT: CPT | Mod: ,,, | Performed by: UROLOGY

## 2020-06-16 PROCEDURE — 99223 1ST HOSP IP/OBS HIGH 75: CPT | Mod: 25,,, | Performed by: UROLOGY

## 2020-06-16 PROCEDURE — 36415 COLL VENOUS BLD VENIPUNCTURE: CPT

## 2020-06-16 PROCEDURE — 74420 PR  X-RAY RETROGRADE PYELOGRAM: ICD-10-PCS | Mod: 26,,, | Performed by: UROLOGY

## 2020-06-16 PROCEDURE — 25000003 PHARM REV CODE 250: Performed by: INTERNAL MEDICINE

## 2020-06-16 PROCEDURE — 25000003 PHARM REV CODE 250: Performed by: UROLOGY

## 2020-06-16 PROCEDURE — 27000673 HC TUBING BLOOD Y: Performed by: ANESTHESIOLOGY

## 2020-06-16 PROCEDURE — 99223 PR INITIAL HOSPITAL CARE,LEVL III: ICD-10-PCS | Mod: 25,,, | Performed by: UROLOGY

## 2020-06-16 PROCEDURE — 25000003 PHARM REV CODE 250: Performed by: NURSE PRACTITIONER

## 2020-06-16 PROCEDURE — S0028 INJECTION, FAMOTIDINE, 20 MG: HCPCS | Performed by: NURSE ANESTHETIST, CERTIFIED REGISTERED

## 2020-06-16 PROCEDURE — 71000033 HC RECOVERY, INTIAL HOUR: Performed by: UROLOGY

## 2020-06-16 PROCEDURE — 27201423 OPTIME MED/SURG SUP & DEVICES STERILE SUPPLY: Performed by: UROLOGY

## 2020-06-16 PROCEDURE — 63600175 PHARM REV CODE 636 W HCPCS: Performed by: UROLOGY

## 2020-06-16 PROCEDURE — 87086 URINE CULTURE/COLONY COUNT: CPT

## 2020-06-16 PROCEDURE — 37000009 HC ANESTHESIA EA ADD 15 MINS: Performed by: UROLOGY

## 2020-06-16 PROCEDURE — C1769 GUIDE WIRE: HCPCS | Performed by: UROLOGY

## 2020-06-16 RX ORDER — OXYCODONE HYDROCHLORIDE 5 MG/1
5 TABLET ORAL
Status: DISCONTINUED | OUTPATIENT
Start: 2020-06-16 | End: 2020-06-16

## 2020-06-16 RX ORDER — DIPHENHYDRAMINE HYDROCHLORIDE 50 MG/ML
12.5 INJECTION INTRAMUSCULAR; INTRAVENOUS
Status: DISCONTINUED | OUTPATIENT
Start: 2020-06-16 | End: 2020-06-16

## 2020-06-16 RX ORDER — FAMOTIDINE 10 MG/ML
INJECTION INTRAVENOUS
Status: DISCONTINUED | OUTPATIENT
Start: 2020-06-16 | End: 2020-06-16

## 2020-06-16 RX ORDER — SODIUM CHLORIDE, SODIUM LACTATE, POTASSIUM CHLORIDE, CALCIUM CHLORIDE 600; 310; 30; 20 MG/100ML; MG/100ML; MG/100ML; MG/100ML
INJECTION, SOLUTION INTRAVENOUS CONTINUOUS PRN
Status: DISCONTINUED | OUTPATIENT
Start: 2020-06-16 | End: 2020-06-16

## 2020-06-16 RX ORDER — ONDANSETRON 2 MG/ML
4 INJECTION INTRAMUSCULAR; INTRAVENOUS DAILY PRN
Status: DISCONTINUED | OUTPATIENT
Start: 2020-06-16 | End: 2020-06-16

## 2020-06-16 RX ORDER — ACETAMINOPHEN 10 MG/ML
INJECTION, SOLUTION INTRAVENOUS
Status: DISCONTINUED | OUTPATIENT
Start: 2020-06-16 | End: 2020-06-16

## 2020-06-16 RX ORDER — SODIUM CHLORIDE 0.9 % (FLUSH) 0.9 %
10 SYRINGE (ML) INJECTION
Status: DISCONTINUED | OUTPATIENT
Start: 2020-06-16 | End: 2020-06-18 | Stop reason: HOSPADM

## 2020-06-16 RX ORDER — MUPIROCIN 20 MG/G
OINTMENT TOPICAL 2 TIMES DAILY
Status: DISCONTINUED | OUTPATIENT
Start: 2020-06-16 | End: 2020-06-18 | Stop reason: HOSPADM

## 2020-06-16 RX ORDER — PROPOFOL 10 MG/ML
VIAL (ML) INTRAVENOUS
Status: DISCONTINUED | OUTPATIENT
Start: 2020-06-16 | End: 2020-06-16

## 2020-06-16 RX ORDER — LIDOCAINE HYDROCHLORIDE 10 MG/ML
INJECTION, SOLUTION EPIDURAL; INFILTRATION; INTRACAUDAL; PERINEURAL
Status: DISCONTINUED | OUTPATIENT
Start: 2020-06-16 | End: 2020-06-16

## 2020-06-16 RX ORDER — ONDANSETRON 2 MG/ML
INJECTION INTRAMUSCULAR; INTRAVENOUS
Status: DISCONTINUED | OUTPATIENT
Start: 2020-06-16 | End: 2020-06-16

## 2020-06-16 RX ORDER — SODIUM CHLORIDE 9 MG/ML
INJECTION, SOLUTION INTRAVENOUS CONTINUOUS
Status: DISCONTINUED | OUTPATIENT
Start: 2020-06-16 | End: 2020-06-17

## 2020-06-16 RX ORDER — FENTANYL CITRATE 50 UG/ML
INJECTION, SOLUTION INTRAMUSCULAR; INTRAVENOUS
Status: DISCONTINUED | OUTPATIENT
Start: 2020-06-16 | End: 2020-06-16

## 2020-06-16 RX ORDER — DEXAMETHASONE SODIUM PHOSPHATE 4 MG/ML
INJECTION, SOLUTION INTRA-ARTICULAR; INTRALESIONAL; INTRAMUSCULAR; INTRAVENOUS; SOFT TISSUE
Status: DISCONTINUED | OUTPATIENT
Start: 2020-06-16 | End: 2020-06-16

## 2020-06-16 RX ORDER — HYDROMORPHONE HYDROCHLORIDE 1 MG/ML
0.2 INJECTION, SOLUTION INTRAMUSCULAR; INTRAVENOUS; SUBCUTANEOUS
Status: DISCONTINUED | OUTPATIENT
Start: 2020-06-16 | End: 2020-06-16

## 2020-06-16 RX ORDER — DIPHENHYDRAMINE HYDROCHLORIDE 50 MG/ML
INJECTION INTRAMUSCULAR; INTRAVENOUS
Status: DISCONTINUED | OUTPATIENT
Start: 2020-06-16 | End: 2020-06-16

## 2020-06-16 RX ORDER — MIDAZOLAM HYDROCHLORIDE 1 MG/ML
INJECTION INTRAMUSCULAR; INTRAVENOUS
Status: DISCONTINUED | OUTPATIENT
Start: 2020-06-16 | End: 2020-06-16

## 2020-06-16 RX ORDER — ROCURONIUM BROMIDE 10 MG/ML
INJECTION, SOLUTION INTRAVENOUS
Status: DISCONTINUED | OUTPATIENT
Start: 2020-06-16 | End: 2020-06-16

## 2020-06-16 RX ADMIN — FAMOTIDINE 20 MG: 10 INJECTION, SOLUTION INTRAVENOUS at 10:06

## 2020-06-16 RX ADMIN — CEFTRIAXONE SODIUM 1 G: 1 INJECTION, POWDER, FOR SOLUTION INTRAMUSCULAR; INTRAVENOUS at 03:06

## 2020-06-16 RX ADMIN — TRAMADOL HYDROCHLORIDE 50 MG: 50 TABLET, FILM COATED ORAL at 10:06

## 2020-06-16 RX ADMIN — ONDANSETRON 4 MG: 2 INJECTION INTRAMUSCULAR; INTRAVENOUS at 10:06

## 2020-06-16 RX ADMIN — TRAMADOL HYDROCHLORIDE 50 MG: 50 TABLET, FILM COATED ORAL at 02:06

## 2020-06-16 RX ADMIN — SUGAMMADEX 200 MG: 100 INJECTION, SOLUTION INTRAVENOUS at 11:06

## 2020-06-16 RX ADMIN — FERROUS SULFATE TAB 325 MG (65 MG ELEMENTAL FE) 325 MG: 325 (65 FE) TAB at 03:06

## 2020-06-16 RX ADMIN — DICYCLOMINE HYDROCHLORIDE 10 MG: 10 CAPSULE ORAL at 09:06

## 2020-06-16 RX ADMIN — CHOLESTYRAMINE 4 G: 4 POWDER, FOR SUSPENSION ORAL at 03:06

## 2020-06-16 RX ADMIN — SODIUM BICARBONATE 650 MG TABLET 650 MG: at 03:06

## 2020-06-16 RX ADMIN — ONDANSETRON HYDROCHLORIDE 4 MG: 2 SOLUTION INTRAMUSCULAR; INTRAVENOUS at 11:06

## 2020-06-16 RX ADMIN — CALCITRIOL CAPSULES 0.25 MCG 0.5 MCG: 0.25 CAPSULE ORAL at 03:06

## 2020-06-16 RX ADMIN — CHOLESTYRAMINE 4 G: 4 POWDER, FOR SUSPENSION ORAL at 09:06

## 2020-06-16 RX ADMIN — ONDANSETRON 4 MG: 2 INJECTION INTRAMUSCULAR; INTRAVENOUS at 12:06

## 2020-06-16 RX ADMIN — SODIUM CHLORIDE, SODIUM LACTATE, POTASSIUM CHLORIDE, AND CALCIUM CHLORIDE: .6; .31; .03; .02 INJECTION, SOLUTION INTRAVENOUS at 10:06

## 2020-06-16 RX ADMIN — MUPIROCIN: 20 OINTMENT TOPICAL at 09:06

## 2020-06-16 RX ADMIN — OXYBUTYNIN CHLORIDE 10 MG: 10 TABLET, EXTENDED RELEASE ORAL at 03:06

## 2020-06-16 RX ADMIN — PROPOFOL 100 MG: 10 INJECTION, EMULSION INTRAVENOUS at 10:06

## 2020-06-16 RX ADMIN — HYDROMORPHONE HYDROCHLORIDE 0.2 MG: 1 INJECTION, SOLUTION INTRAMUSCULAR; INTRAVENOUS; SUBCUTANEOUS at 12:06

## 2020-06-16 RX ADMIN — TRAMADOL HYDROCHLORIDE 50 MG: 50 TABLET, FILM COATED ORAL at 06:06

## 2020-06-16 RX ADMIN — DILTIAZEM HYDROCHLORIDE 120 MG: 120 CAPSULE, COATED, EXTENDED RELEASE ORAL at 03:06

## 2020-06-16 RX ADMIN — LIDOCAINE HYDROCHLORIDE 100 MG: 10 INJECTION, SOLUTION EPIDURAL; INFILTRATION; INTRACAUDAL; PERINEURAL at 10:06

## 2020-06-16 RX ADMIN — DEXAMETHASONE SODIUM PHOSPHATE 8 MG: 4 INJECTION, SOLUTION INTRAMUSCULAR; INTRAVENOUS at 10:06

## 2020-06-16 RX ADMIN — FENTANYL CITRATE 50 MCG: 50 INJECTION INTRAMUSCULAR; INTRAVENOUS at 10:06

## 2020-06-16 RX ADMIN — SODIUM CHLORIDE: 0.9 INJECTION, SOLUTION INTRAVENOUS at 06:06

## 2020-06-16 RX ADMIN — DIPHENHYDRAMINE HYDROCHLORIDE 6.25 MG: 50 INJECTION, SOLUTION INTRAMUSCULAR; INTRAVENOUS at 10:06

## 2020-06-16 RX ADMIN — ACETAMINOPHEN 1000 MG: 10 INJECTION, SOLUTION INTRAVENOUS at 10:06

## 2020-06-16 RX ADMIN — MIDAZOLAM HYDROCHLORIDE 1 MG: 1 INJECTION, SOLUTION INTRAMUSCULAR; INTRAVENOUS at 10:06

## 2020-06-16 RX ADMIN — MIRTAZAPINE 15 MG: 15 TABLET, FILM COATED ORAL at 09:06

## 2020-06-16 RX ADMIN — Medication: at 12:06

## 2020-06-16 RX ADMIN — DICYCLOMINE HYDROCHLORIDE 10 MG: 10 CAPSULE ORAL at 03:06

## 2020-06-16 RX ADMIN — MAGNESIUM OXIDE 800 MG: 400 TABLET ORAL at 03:06

## 2020-06-16 RX ADMIN — ROCURONIUM BROMIDE 20 MG: 10 INJECTION, SOLUTION INTRAVENOUS at 10:06

## 2020-06-16 RX ADMIN — SODIUM BICARBONATE 650 MG TABLET 650 MG: at 09:06

## 2020-06-16 NOTE — CONSULTS
[facility]rology [facility] Consult Patient/H&P:    Shara Hutchins is a 70 y.o. female who presents for gross hematuria.    Patient with a history of HTN, vulvar cancer s/p chemo/XRT in 2013 and CKD who presented to the [facility] emergency department on 2/23/20 with abdominal pain, vaginal bleeding, DARCI  (3.8 from 2.5 baseline) and a 20 pound weight loss. Lee was placed on admission to monitor UOP. CTRSS 2/12 2/23 showed bilateral nonobstructing renal stones.      She underwent loop ileostomy on 2/13/20 for chronic diarrhea. Renal ultrasound on 2/26 showed no hydro, b renal stones and indeterminate artifact. She went to [facility] on 3/7 and returned and states gross hematuria started after this. Prior to admission pt denied having gross hematuria, urinary frequency, vaginal bleeding or frequent uti's. She did however state that her vulvar itching had returned and she was concerned about recurrence of cancer with no recent f/u by gyn onc. She has seen by  inpt and plan was for outpt f/u with .      She has been awaiting LTAC placement on 3/13. Consulted for persistent hematuria. The plan was for outpt cysto to eval for any bladder path not seen on ct and discussion of renal stones as well as discharging with lee. Her hematuria continued, ucx on 3/15 grew Klebsiella and she was started on rocephin and completed a 5d course. Around 3/17  she was given a voiding trial vs lee exchange? on 3/17 for larger 18fr.       She continued to have hematuria requiring intermittent irrigation and 2 transfusions. Urology was re-consulted 3/24, Dr. Garcia asked them to remove the 18fr, place a 22 Danish 3 way. CBI weaned off. She underwent ileostomy reversal on 5/18/20. Discharged from the hospital on 5/23/20.        Interval History     6/10/20: Patient presents today with hematuria for one day. She was prescribed Pyridium and Macrodantin for presumed UTI.      6/16/20: Patient presented today [facility] ED  on 6/14/20 with worsening gross hematuria. Urology consulted to assist with management. States she became light-headed and dizzy at home due to anemia.     She denies any fever, chills, nausea, emesis, bone pain, weight loss or  trauma. Still with same catheter due to difficulty exchanging catheter.    Past Medical History:   Diagnosis Date    Chronic kidney disease, stage III (moderate) 1/7/2018    Diabetes mellitus, type 2     Fatigue     Hypertension     Iron deficiency anemia due to chronic blood loss 1/7/2018    Vulvar cancer 1/7/2019       Past Surgical History:   Procedure Laterality Date    ABDOMINAL SURGERY      AV FISTULA PLACEMENT Left 2018    BACK SURGERY      carpel tunnel surgery once on each hand      CERVICAL FUSION      x 4    cervix repair      CHOLECYSTECTOMY  2000    DIAGNOSTIC LAPAROSCOPY N/A 2/13/2020    Procedure: LAPAROSCOPY, DIAGNOSTIC;  Surgeon: Robbi Lovell III, MD;  Location: [facility];  Service: General;  Laterality: N/A;    HYSTERECTOMY  1980    ILEOSTOMY N/A 2/13/2020    Procedure: CREATION, ILEOSTOMY Loop;  Surgeon: Robbi Lovell III, MD;  Location: [facility] OR;  Service: General;  Laterality: N/A;    ILEOSTOMY CLOSURE N/A 5/18/2020    Procedure: CLOSURE, ILEOSTOMY;  Surgeon: Robbi Lovell III, MD;  Location: [facility] OR;  Service: General;  Laterality: N/A;    LUMBAR LAMINECTOMY      LYSIS OF ADHESIONS N/A 2/13/2020    Procedure: LYSIS, ADHESIONS;  Surgeon: Robbi Lovell III, MD;  Location: [facility] OR;  Service: General;  Laterality: N/A;    NECK SURGERY      PHLEBOGRAPHY  2/28/2020    Procedure: VENOGRAM;  Surgeon: Robbi Lovell III, MD;  Location: [facility] OR;  Service: General;;    REMOVAL OF VASCULAR ACCESS PORT Right 2/13/2020    Procedure: REMOVAL, VASCULAR ACCESS PORT;  Surgeon: Robbi Lovell III, MD;  Location: [facility] OR;  Service: General;  Laterality: Right;    SHOULDER SURGERY      vulva cancer         Family History   Problem Relation  Age of Onset    Heart disease Mother     Hypertension Mother     Cancer Mother         lung    Heart disease Father     Heart disease Sister         Open heart surgery    No Known Problems Daughter        Social History     Socioeconomic History    Marital status:      Spouse name: Not on file    Number of children: Not on file    Years of education: Not on file    Highest education level: Not on file   Occupational History    Not on file   Social Needs    Financial resource strain: Not on file    Food insecurity     Worry: Not on file     Inability: Not on file    Transportation needs     Medical: Not on file     Non-medical: Not on file   Tobacco Use    Smoking status: Former Smoker     Packs/day: 1.00     Years: 33.00     Pack years: 33.00     Types: Cigarettes     Quit date: 2000     Years since quittin.9    Smokeless tobacco: Former User     Quit date:    Substance and Sexual Activity    Alcohol use: No    Drug use: No    Sexual activity: Not on file   Lifestyle    Physical activity     Days per week: Not on file     Minutes per session: Not on file    Stress: Not on file   Relationships    Social connections     Talks on phone: Not on file     Gets together: Not on file     Attends [employer] service: Not on file     Active member of club or organization: Not on file     Attends meetings of clubs or organizations: Not on file     Relationship status: Not on file   Other Topics Concern    Not on file   Social History Narrative    Not on file       Review of patient's allergies indicates:   Allergen Reactions    Ciprofloxacin Other (See Comments)     Elevated liver enzymes      Pcn [penicillins] Anaphylaxis       Medications Reviewed: see MAR    ROS:    Constitutional: denies fevers, chills, night sweats, fatigue, malaise  Respiratory: negative for cough, shortness of breath, wheezing, dyspnea.  Cardiovascular: + for high blood pressure, negative for chest pain,  varicose veins, ankle swelling, palpitations, syncope.  GI: negative for abdominal pain, heartburn, indigestion, nausea, vomiting, constipation, diarrhea, blood in stool.   Urology: as noted above in HPI  Endocrinology: negative for cold intolerance, excessive thirst, not feeling tired/sluggish, no heat intolerance.   Hematology/Lymph: negative for easy bleeding, easy bruising, swollen glands.  Musculoskeletal: negative for back pain, joint pain, joint swelling, neck pain.  Allergy-Immunology: negative for seasonal allergies, negative for unusual infections.   Skin: negative for boils, breast lumps, hives, itching, rash.   Neurology: negative for, dizziness, headache, tingling/numbness, tremors.   Psych: satisfied with life; negative for, anxiety, depression, suicidal thoughts.     PHYSICAL EXAM:    Vitals:    06/16/20 0713   BP: (!) 149/74   Pulse: 71   Resp: 18   Temp: 98.1 °F (36.7 °C)     Body mass index is 19.58 kg/m².       General: Alert, cooperative, no distress, appears stated age  Head: Normocephalic, without obvious abnormality, atraumatic  Neck: no masses, no thyromegaly, no lymphadenopathy  Eyes: PERRL, conjunctiva/corneas clear  Lungs: Respirations unlabored, normal effort, no accessory muscle use  CV: Warm and well perfused extremities  Abdomen: Soft, non-tender, no CVA tenderness, no hepatosplenomegaly, no hernia  : 3-way lee catheter in place with pink urine on CBI  Extremities: Extremities normal, atraumatic, no cyanosis or edema  Skin: Normal color, texture, and turgor, no rashes or lesions  Psych: Appropriate, well oriented, normal affect, normal mood  Neuro: Non-focal      LABS:    Recent Results (from the past 336 hour(s))   CBC Without Differential    Collection Time: 06/03/20  2:29 PM   Result Value Ref Range    WBC 8.13 3.90 - 12.70 K/uL    RBC 2.86 (L) 4.00 - 5.40 M/uL    Hemoglobin 9.1 (L) 12.0 - 16.0 g/dL    Hematocrit 29.3 (L) 37.0 - 48.5 %    Mean Corpuscular Volume 102 (H) 82 - 98  fL    Mean Corpuscular Hemoglobin 31.8 (H) 27.0 - 31.0 pg    Mean Corpuscular Hemoglobin Conc 31.1 (L) 32.0 - 36.0 g/dL    RDW 17.9 (H) 11.5 - 14.5 %    Platelets 233 150 - 350 K/uL    MPV 10.3 9.2 - 12.9 fL   Comprehensive metabolic panel    Collection Time: 06/03/20  2:29 PM   Result Value Ref Range    Sodium 141 136 - 145 mmol/L    Potassium 4.3 3.5 - 5.1 mmol/L    Chloride 117 (H) 95 - 110 mmol/L    CO2 14 (L) 23 - 29 mmol/L    Glucose 90 70 - 110 mg/dL    BUN, Bld 22 8 - 23 mg/dL    Creatinine 2.2 (H) 0.5 - 1.4 mg/dL    Calcium 8.4 (L) 8.7 - 10.5 mg/dL    Total Protein 5.7 (L) 6.0 - 8.4 g/dL    Albumin 3.0 (L) 3.5 - 5.2 g/dL    Total Bilirubin 0.4 0.1 - 1.0 mg/dL    Alkaline Phosphatase 118 55 - 135 U/L    AST 12 10 - 40 U/L    ALT 9 (L) 10 - 44 U/L    Anion Gap 10 8 - 16 mmol/L    eGFR if African American 25.4 (A) >60 mL/min/1.73 m^2    eGFR if non  22.1 (A) >60 mL/min/1.73 m^2   Urine culture    Collection Time: 06/03/20  2:29 PM    Specimen: Urine, Catheterized   Result Value Ref Range    Urine Culture, Routine ESCHERICHIA COLI  >100,000 cfu/ml   (A)        Susceptibility    Escherichia coli - CULTURE, URINE     Amp/Sulbactam >16/8 Resistant mcg/mL     Ampicillin >16 Resistant mcg/mL     Amox/K Clav'ate <=8/4 Sensitive mcg/mL     Ceftriaxone <=1 Sensitive mcg/mL     Cefazolin 4 Sensitive mcg/mL     Ciprofloxacin <=1 Sensitive mcg/mL     Cefepime <=2 Sensitive mcg/mL     Ertapenem <=0.5 Sensitive mcg/mL     Nitrofurantoin <=32 Sensitive mcg/mL     Gentamicin <=4 Sensitive mcg/mL     Levofloxacin <=2 Sensitive mcg/mL     Meropenem <=1 Sensitive mcg/mL     Piperacillin/Tazo <=16 Sensitive mcg/mL     Trimeth/Sulfa <=2/38 Sensitive mcg/mL     Tetracycline <=4 Sensitive mcg/mL     Tobramycin <=4 Sensitive mcg/mL   CBC auto differential    Collection Time: 06/14/20 12:45 PM   Result Value Ref Range    WBC 6.65 3.90 - 12.70 K/uL    RBC 2.41 (L) 4.00 - 5.40 M/uL    Hemoglobin 7.6 (L) 12.0 - 16.0 g/dL     Hematocrit 24.2 (L) 37.0 - 48.5 %    Mean Corpuscular Volume 100 (H) 82 - 98 fL    Mean Corpuscular Hemoglobin 31.5 (H) 27.0 - 31.0 pg    Mean Corpuscular Hemoglobin Conc 31.4 (L) 32.0 - 36.0 g/dL    RDW 16.0 (H) 11.5 - 14.5 %    Platelets 281 150 - 350 K/uL    MPV 9.3 9.2 - 12.9 fL    Immature Granulocytes 0.3 0.0 - 0.5 %    Gran # (ANC) 5.5 1.8 - 7.7 K/uL    Immature Grans (Abs) 0.02 0.00 - 0.04 K/uL    Lymph # 0.8 (L) 1.0 - 4.8 K/uL    Mono # 0.3 0.3 - 1.0 K/uL    Eos # 0.1 0.0 - 0.5 K/uL    Baso # 0.02 0.00 - 0.20 K/uL    nRBC 0 0 /100 WBC    Gran% 82.8 (H) 38.0 - 73.0 %    Lymph% 11.6 (L) 18.0 - 48.0 %    Mono% 3.9 (L) 4.0 - 15.0 %    Eosinophil% 1.1 0.0 - 8.0 %    Basophil% 0.3 0.0 - 1.9 %    Differential Method Automated    Comprehensive metabolic panel    Collection Time: 06/14/20 12:45 PM   Result Value Ref Range    Sodium 137 136 - 145 mmol/L    Potassium 5.3 (H) 3.5 - 5.1 mmol/L    Chloride 111 (H) 95 - 110 mmol/L    CO2 21 (L) 23 - 29 mmol/L    Glucose 133 (H) 70 - 110 mg/dL    BUN, Bld 29 (H) 8 - 23 mg/dL    Creatinine 2.1 (H) 0.5 - 1.4 mg/dL    Calcium 8.2 (L) 8.7 - 10.5 mg/dL    Total Protein 6.4 6.0 - 8.4 g/dL    Albumin 3.5 3.5 - 5.2 g/dL    Total Bilirubin 0.6 0.1 - 1.0 mg/dL    Alkaline Phosphatase 122 55 - 135 U/L    AST 12 10 - 40 U/L    ALT 9 (L) 10 - 44 U/L    Anion Gap 5 (L) 8 - 16 mmol/L    eGFR if African American 26.9 (A) >60 mL/min/1.73 m^2    eGFR if non  23.3 (A) >60 mL/min/1.73 m^2   Protime-INR    Collection Time: 06/14/20 12:45 PM   Result Value Ref Range    PT 15.5 (H) 10.6 - 14.8 sec    INR 1.3    Type & Screen    Collection Time: 06/14/20 12:45 PM   Result Value Ref Range    Group & Rh A POS     Indirect Bruno NEG    Prepare RBC 1 Unit    Collection Time: 06/14/20 12:45 PM   Result Value Ref Range    UNIT NUMBER [id number]     Product Code [id number]     DISPENSE STATUS TRANSFUSED     CODING SYSTEM UCPA473     Unit Blood Type Code 6200     Unit Blood Type A  POS     Unit Expiration [id number]    Troponin I    Collection Time: 06/14/20 12:45 PM   Result Value Ref Range    Troponin I <0.030 <=0.040 ng/mL   Urinalysis, Reflex to Urine Culture Urine, Clean Catch    Collection Time: 06/14/20  1:05 PM    Specimen: Urine   Result Value Ref Range    Specimen UA Urine, Clean Catch     Color, UA Red (A) Yellow, Straw, Na    Appearance, UA Cloudy (A) Clear    pH, UA 7.0 5.0 - 8.0    Specific Gravity, UA 1.015 1.005 - 1.030    Protein, UA SEE COMMENT Negative    Glucose, UA SEE COMMENT Negative    Ketones, UA SEE COMMENT Negative    Bilirubin (UA) SEE COMMENT Negative    Occult Blood UA SEE COMMENT Negative    Nitrite, UA SEE COMMENT Negative    Urobilinogen, UA SEE COMMENT Negative EU/dL    Leukocytes, UA SEE COMMENT Negative   Urinalysis Microscopic    Collection Time: 06/14/20  1:05 PM   Result Value Ref Range    RBC, UA >100 (H) 0 - 4 /hpf    WBC, UA 0 0 - 5 /hpf    Bacteria Negative None-Occ /hpf    Squam Epithel, UA 5 /hpf    Hyaline Casts, UA 3 (A) 0-1/lpf /lpf    Microscopic Comment SEE COMMENT    COVID-19 Rapid Screening    Collection Time: 06/14/20  1:10 PM   Result Value Ref Range    SARS-CoV-2 RNA, Amplification, Qual Negative Negative   MRSA Screen by PCR    Collection Time: 06/14/20  3:20 PM    Specimen: Nasal; Nasopharyngeal Swab   Result Value Ref Range    MRSA SCREEN BY PCR Positive (A) Negative   Urine Culture High Risk    Collection Time: 06/14/20  4:51 PM    Specimen: Urine, Catheterized   Result Value Ref Range    Urine Culture, Routine ENTEROBACTER AEROGENES  >100,000 cfu/ml   (A)        Susceptibility    Enterobacter aerogenes - CULTURE, URINE     Ceftriaxone <=1 Sensitive mcg/mL     Ciprofloxacin <=1 Sensitive mcg/mL     Cefepime <=2 Sensitive mcg/mL     Ertapenem <=0.5 Sensitive mcg/mL     Nitrofurantoin <=32 Sensitive mcg/mL     Gentamicin <=4 Sensitive mcg/mL     Levofloxacin <=2 Sensitive mcg/mL     Meropenem <=1 Sensitive mcg/mL      Piperacillin/Tazo <=16 Sensitive mcg/mL     Trimeth/Sulfa <=2/38 Sensitive mcg/mL     Tetracycline <=4 Sensitive mcg/mL     Tobramycin <=4 Sensitive mcg/mL   Hemoglobin    Collection Time: 06/14/20 10:02 PM   Result Value Ref Range    Hemoglobin 7.7 (L) 12.0 - 16.0 g/dL   Hematocrit    Collection Time: 06/14/20 10:02 PM   Result Value Ref Range    Hematocrit 23.7 (L) 37.0 - 48.5 %   CBC auto differential    Collection Time: 06/15/20  1:48 AM   Result Value Ref Range    WBC 6.53 3.90 - 12.70 K/uL    RBC 2.49 (L) 4.00 - 5.40 M/uL    Hemoglobin 7.8 (L) 12.0 - 16.0 g/dL    Hematocrit 24.3 (L) 37.0 - 48.5 %    Mean Corpuscular Volume 98 82 - 98 fL    Mean Corpuscular Hemoglobin 31.3 (H) 27.0 - 31.0 pg    Mean Corpuscular Hemoglobin Conc 32.1 32.0 - 36.0 g/dL    RDW 16.5 (H) 11.5 - 14.5 %    Platelets 225 150 - 350 K/uL    MPV 9.4 9.2 - 12.9 fL    Immature Granulocytes 0.3 0.0 - 0.5 %    Gran # (ANC) 4.1 1.8 - 7.7 K/uL    Immature Grans (Abs) 0.02 0.00 - 0.04 K/uL    Lymph # 1.7 1.0 - 4.8 K/uL    Mono # 0.5 0.3 - 1.0 K/uL    Eos # 0.2 0.0 - 0.5 K/uL    Baso # 0.02 0.00 - 0.20 K/uL    nRBC 0 0 /100 WBC    Gran% 63.1 38.0 - 73.0 %    Lymph% 25.4 18.0 - 48.0 %    Mono% 7.8 4.0 - 15.0 %    Eosinophil% 3.1 0.0 - 8.0 %    Basophil% 0.3 0.0 - 1.9 %    Differential Method Automated    Comprehensive Metabolic Panel (CMP)    Collection Time: 06/15/20  1:48 AM   Result Value Ref Range    Sodium 138 136 - 145 mmol/L    Potassium 5.7 (H) 3.5 - 5.1 mmol/L    Chloride 112 (H) 95 - 110 mmol/L    CO2 20 (L) 23 - 29 mmol/L    Glucose 71 70 - 110 mg/dL    BUN, Bld 29 (H) 8 - 23 mg/dL    Creatinine 2.1 (H) 0.5 - 1.4 mg/dL    Calcium 8.1 (L) 8.7 - 10.5 mg/dL    Total Protein 5.5 (L) 6.0 - 8.4 g/dL    Albumin 2.9 (L) 3.5 - 5.2 g/dL    Total Bilirubin 0.8 0.1 - 1.0 mg/dL    Alkaline Phosphatase 99 55 - 135 U/L    AST 10 10 - 40 U/L    ALT 6 (L) 10 - 44 U/L    Anion Gap 6 (L) 8 - 16 mmol/L    eGFR if African American 26.9 (A) >60 mL/min/1.73 m^2     eGFR if non  23.3 (A) >60 mL/min/1.73 m^2   Magnesium    Collection Time: 06/15/20  1:48 AM   Result Value Ref Range    Magnesium 1.7 1.6 - 2.6 mg/dL   Hemoglobin    Collection Time: 06/15/20  8:04 AM   Result Value Ref Range    Hemoglobin 9.2 (L) 12.0 - 16.0 g/dL   Hematocrit    Collection Time: 06/15/20  8:04 AM   Result Value Ref Range    Hematocrit 28.8 (L) 37.0 - 48.5 %   Hemoglobin    Collection Time: 06/15/20  4:09 PM   Result Value Ref Range    Hemoglobin 8.2 (L) 12.0 - 16.0 g/dL   Hematocrit    Collection Time: 06/15/20  4:09 PM   Result Value Ref Range    Hematocrit 25.1 (L) 37.0 - 48.5 %   Hemoglobin    Collection Time: 06/15/20 11:47 PM   Result Value Ref Range    Hemoglobin 8.2 (L) 12.0 - 16.0 g/dL   Hematocrit    Collection Time: 06/15/20 11:47 PM   Result Value Ref Range    Hematocrit 25.5 (L) 37.0 - 48.5 %   Hemoglobin    Collection Time: 06/16/20  8:07 AM   Result Value Ref Range    Hemoglobin 8.7 (L) 12.0 - 16.0 g/dL   Hematocrit    Collection Time: 06/16/20  8:07 AM   Result Value Ref Range    Hematocrit 27.0 (L) 37.0 - 48.5 %   Basic metabolic panel    Collection Time: 06/16/20  8:07 AM   Result Value Ref Range    Sodium 138 136 - 145 mmol/L    Potassium 5.5 (H) 3.5 - 5.1 mmol/L    Chloride 110 95 - 110 mmol/L    CO2 20 (L) 23 - 29 mmol/L    Glucose 72 70 - 110 mg/dL    BUN, Bld 30 (H) 8 - 23 mg/dL    Creatinine 2.0 (H) 0.5 - 1.4 mg/dL    Calcium 8.4 (L) 8.7 - 10.5 mg/dL    Anion Gap 8 8 - 16 mmol/L    eGFR if  28.5 (A) >60 mL/min/1.73 m^2    eGFR if non  24.8 (A) >60 mL/min/1.73 m^2       IMAGING:    US renal         3/25/20            Images independently reviewed by me     1.  Godwin catheter partially decompressing the bladder with trace adjacent debris.    2.  No hydronephrosis.    3.  Small kidneys with increased parenchymal echotexture suggesting mild medical renal disease.    4.  Dense medullary pyramidal calcifications seen throughout  both kidneys in keeping with medullary nephrocalcinosis (or possibly diffuse stone disease), consider correlation with the clinical history.    5.  Hepatic parenchymal echotexture consistent with hepatic steatosis.      Assessment/Diagnosis:    1. Recurrent gross hematuria    Plans:    - We discussed the etiology and management of the patient's gross hematuria. Explained that hematuria is multi-factorial and can be secondary to  cancer, obstructive uropathy, nephritis,  trauma,  infections, thrombosis, nephrolithiasis, bleeding disorders and medications. We discussed hematuria work-up and the benefit of further evaluation with cystourethroscopy and possible fulguration URGENTLY today to rule out any malignancy, stones or other pathology. All risks, benefits and alternatives discussed at length and all questions answered.

## 2020-06-16 NOTE — PROGRESS NOTES
[facility]  Progress Note    Patient Name: Shara Hutchins  MRN: [id number]  Patient Class: IP- Inpatient   Admission Date: 6/14/2020 12:24 PM  Attending Physician: Maryana Morgan MD  Primary Care Provider: April Horton MD  Face-to-Face encounter date: 06/16/2020    Assessment & Plan:   Shara Hutchins is a 70 y.o. female with:    Symptomatic Anemia due to Reoccurring Gross Hematuria  DARCI on CKD 3  Hyperkalemia  Complicated by Enterobacter UTI  - cystoscopy today  - CBI overnight  - rocephin  - IVFs  - kayexalate   - urology recs noted and appreciated    Other chronic conditions:  Home meds as appropriate  Electrolyte derangement:  Trending BMP, Mg; Replacement prn  DVT ppx:  SCDs  FULL CODE    Discharge Planning:     Will evaluate when medically stable.    Subjective:      HPI:    Shara Hutchins is a 70 y.o. White female who  has a past medical history of Chronic kidney disease, stage III (moderate) (1/7/2018), Diabetes mellitus, type 2, Fatigue, Hypertension, Iron deficiency anemia due to chronic blood loss (1/7/2018), and Vulvar cancer (1/7/2019).. The patient presented to [facility] on 6/14/2020 with a primary complaint of Hematuria (X 4 DAYS, FEELING WEAK NOW) and Abdominal Pain (@ OSTOMY SITE)     History was obtained from the patient and ER physician Sign-out. Patient presents to the ED with the complaint of hematuria x 4 days and she is now feeling weak. She has a chronic indwelling catheter since March. She is followed by Urology. She saw urology 4 days ago for onset of gross bloody urine. She was started on Macrobid and pyridium at that time for UTI. She reports that hematuria persisted and she now feels weak. She also endorses mild pain to lower abdomen. She denies fever, chills, chest pain, sob, rectal bleeding, melena, numbness, tingling, or LOC.      In the ED, VSS.  CBC with h/h 7.6/24.2 which is decreased from few days prior. CMP with K+ 5.3 and worsening  "kidney function. Urology was consulted who recommended the patient be started on CBI.      Decision to admit was taken and patient was informed about the plan of care.     Hospital Course:    6/16:  Hematuria continues.  Cystoscopy today.  No CP.    Review of Systems   All systems reviewed and are negative except as noted per above.  Objective:     Physical Exam  /67   Pulse 80   Temp 98 °F (36.7 °C) (Oral)   Resp 15   Ht 5' 7" (1.702 m)   Wt 56.7 kg (125 lb)   SpO2 99%   Breastfeeding No   BMI 19.58 kg/m²     Gen: alert, responsive   HEENT:  Eyes WITH NO pallor  External ears with no lesions  Nares patent  Mouth - lips chapped  CV: RRR  Lungs: CTA B/L  Abd: +BS, soft, NT, ND  Ext: no atrophy or edema  Skin: warm, dry  Neuro: grossly intact  Psych: pleasant    Recent Labs   Lab 06/16/20  1647   HGB 8.8*   HCT 27.2*     Recent Labs   Lab 06/16/20  0807   CALCIUM 8.4*      K 5.5*   CO2 20*      BUN 30*   CREATININE 2.0*     No results found for: POCTGLUCOSE   Microbiology Results (last 7 days)     Procedure Component Value Units Date/Time    Urine Culture High Risk [[id number]] Collected: 06/16/20 1158    Order Status: Sent Specimen: Urine, Clean Catch Updated: 06/16/20 1348    Urine Culture High Risk [[id number]]  (Abnormal)  (Susceptibility) Collected: 06/14/20 1651    Order Status: Completed Specimen: Urine, Catheterized Updated: 06/16/20 0637     Urine Culture, Routine ENTEROBACTER AEROGENES  >100,000 cfu/ml      Narrative:      Indicated criteria for high risk culture:->Prior to urologic  procedures    MRSA Screen by PCR [[id number]]  (Abnormal) Collected: 06/14/20 1520    Order Status: Completed Specimen: Nasopharyngeal Swab from Nasal Updated: 06/14/20 1821     MRSA SCREEN BY PCR Positive     Comment: MRSA critical result(s) called and verbal readback obtained from   Juan Barber [id number] by CD3 06/14/2020 18:21         Narrative:         MRSA critical result(s) called and verbal " readback obtained from   Juan Barber [id number] by MATTHEW 06/14/2020 18:21        FL Retrograde Pyelogram   Final Result      3 mm distal left ureteral stone      Normal right retrograde pyelogram         Electronically signed by: Meredith Colunga MD   Date:    06/16/2020   Time:    11:52      X-Ray Chest AP Portable   Final Result          All labs, images, and other studies - including those not included in this note -- were reviewed personally by me.    Inpatient medications  Scheduled Meds:   calcitRIOL  0.5 mcg Oral Daily    cefTRIAXone (ROCEPHIN) IVPB  1 g Intravenous Q24H    cholestyramine-aspartame  4 g Oral BID    dicyclomine  10 mg Oral TID    diltiaZEM  120 mg Oral Daily    ferrous sulfate  325 mg Oral Daily    levothyroxine  88 mcg Oral Before breakfast    magnesium oxide  800 mg Oral Daily    mirtazapine  15 mg Oral QHS    oxybutynin  10 mg Oral Daily    sodium bicarbonate  650 mg Oral BID     Continuous Infusions:  PRN Meds:.sodium chloride, acetaminophen, benzocaine, ondansetron, sodium chloride 0.9%, sodium chloride 0.9%, traMADoL    Above encounter included review of the medical records, interviewing and examining the patient face-to-face, discussion with family and other health care providers, ordering and interpreting lab/test results and formulating a plan of care.     Maryana Morgan MD  [facility] Hospitalist

## 2020-06-16 NOTE — TRANSFER OF CARE
"Anesthesia Transfer of Care Note    Patient: Shara Hutchins    Procedure(s) Performed: Procedure(s) (LRB):  CYSTOSCOPY, WITH BLADDER BIOPSY, WITH FULGURATION IF INDICATED (N/A)    Patient location: PACU    Anesthesia Type: general    Transport from OR: Transported from OR on room air with adequate spontaneous ventilation    Post pain: adequate analgesia    Post assessment: no apparent anesthetic complications    Post vital signs: stable    Level of consciousness: awake    Nausea/Vomiting: no nausea/vomiting    Complications: none    Transfer of care protocol was followed      Last vitals:   Visit Vitals  BP (!) 149/74 (BP Location: Right arm, Patient Position: Sitting)   Pulse 71   Temp 36.7 °C (98.1 °F) (Oral)   Resp 18   Ht 5' 7" (1.702 m)   Wt 56.7 kg (125 lb)   SpO2 96%   Breastfeeding No   BMI 19.58 kg/m²     "

## 2020-06-16 NOTE — ANESTHESIA PREPROCEDURE EVALUATION
06/15/2020  Shara Hutchins is a 70 y.o., female.  Patient Active Problem List   Diagnosis    Inflammatory bowel disease    Vitamin D deficiency    Hisotry of Vulvar cancer    Anemia of decreased vitamin B12 absorption    Anemia due to stage 5 chronic kidney disease treated with erythropoietin    Hypothyroidism    Anemia of chronic disease    Nausea & vomiting    Chronic diarrhea    Cellulitis    S/P ileostomy    DARCI (acute kidney injury)    DNR (do not resuscitate)    Severe dehydration due to chronic diarrhea/high output ileostomy    Electrolyte imbalance    Severe malnutrition    History of difficult venous access    Unstageable pressure injury of skin and tissue    Malnutrition    Acute hypoxemic respiratory failure    Pulmonary hypertension    Status post reversal of ileostomy    Mild protein-energy malnutrition    Symptomatic anemia                                                                                                                  06/15/2020  Shara Hutchins is a 70 y.o., female   Patient Active Problem List   Diagnosis    Inflammatory bowel disease    Vitamin D deficiency    Hisotry of Vulvar cancer    Anemia of decreased vitamin B12 absorption    Anemia due to stage 5 chronic kidney disease treated with erythropoietin    Hypothyroidism    Anemia of chronic disease    Nausea & vomiting    Chronic diarrhea    Cellulitis    S/P ileostomy    DARCI (acute kidney injury)    DNR (do not resuscitate)    Severe dehydration due to chronic diarrhea/high output ileostomy    Electrolyte imbalance    Severe malnutrition    History of difficult venous access    Unstageable pressure injury of skin and tissue    Malnutrition    Acute hypoxemic respiratory failure    Pulmonary hypertension    Status post reversal of ileostomy    Mild protein-energy malnutrition     Symptomatic anemia       Past Surgical History:   Procedure Laterality Date    ABDOMINAL SURGERY      AV FISTULA PLACEMENT Left 2018    BACK SURGERY      carpel tunnel surgery once on each hand      CERVICAL FUSION      x 4    cervix repair      CHOLECYSTECTOMY  2000    DIAGNOSTIC LAPAROSCOPY N/A 2/13/2020    Procedure: LAPAROSCOPY, DIAGNOSTIC;  Surgeon: Robbi Lovell III, MD;  Location: [facility];  Service: General;  Laterality: N/A;    HYSTERECTOMY  1980    ILEOSTOMY N/A 2/13/2020    Procedure: CREATION, ILEOSTOMY Loop;  Surgeon: Robbi Lovell III, MD;  Location: [facility] OR;  Service: General;  Laterality: N/A;    ILEOSTOMY CLOSURE N/A 5/18/2020    Procedure: CLOSURE, ILEOSTOMY;  Surgeon: Robbi Lovell III, MD;  Location: [facility] OR;  Service: General;  Laterality: N/A;    LUMBAR LAMINECTOMY      LYSIS OF ADHESIONS N/A 2/13/2020    Procedure: LYSIS, ADHESIONS;  Surgeon: Robbi Lovell III, MD;  Location: [facility] OR;  Service: General;  Laterality: N/A;    NECK SURGERY      PHLEBOGRAPHY  2/28/2020    Procedure: VENOGRAM;  Surgeon: Robbi Lovell III, MD;  Location: [facility];  Service: General;;    REMOVAL OF VASCULAR ACCESS PORT Right 2/13/2020    Procedure: REMOVAL, VASCULAR ACCESS PORT;  Surgeon: Robbi Lovell III, MD;  Location: [facility];  Service: General;  Laterality: Right;    SHOULDER SURGERY      vulva cancer          Tobacco Use:  The patient  reports that she quit smoking about 19 years ago. Her smoking use included cigarettes. She has a 33.00 pack-year smoking history. She quit smokeless tobacco use about 20 years ago.     Results for orders placed or performed during the hospital encounter of 06/14/20   EKG 12-lead    Collection Time: 06/15/20  6:29 AM    Narrative    Test Reason : E87.5,    Vent. Rate : 070 BPM     Atrial Rate : 070 BPM     P-R Int : 162 ms          QRS Dur : 086 ms      QT Int : 380 ms       P-R-T Axes : 070 030 055 degrees     QTc Int : 410  ms    Normal sinus rhythm  Normal ECG  When compared with ECG of 03-MAR-2020 12:42,  Minimal criteria for Inferior infarct are no longer Present  T wave amplitude has increased in Anterior leads    Referred By: [email or web address]   SELF           Confirmed By:              Lab Results   Component Value Date    WBC 6.53 06/15/2020    HGB 8.2 (L) 06/15/2020    HCT 25.1 (L) 06/15/2020    MCV 98 06/15/2020     06/15/2020     BMP  Lab Results   Component Value Date     06/15/2020    K 5.7 (H) 06/15/2020     (H) 06/15/2020    CO2 20 (L) 06/15/2020    BUN 29 (H) 06/15/2020    CREATININE 2.1 (H) 06/15/2020    CALCIUM 8.1 (L) 06/15/2020    ANIONGAP 6 (L) 06/15/2020    ESTGFRAFRICA 26.9 (A) 06/15/2020    EGFRNONAA 23.3 (A) 06/15/2020       3/2020 ECHO    · Mild concentric left ventricular hypertrophy.  · Pulmonary hypertension present.  · Normal left ventricular systolic function. The estimated ejection fraction is 60%.  · Grade I (mild) left ventricular diastolic dysfunction consistent with impaired relaxation.  · No wall motion abnormalities.  · Normal right ventricular systolic function.  · Intermediate central venous pressure (8 mmHg).  · Trivial pericardial effusion.  · There is a left pleural effusion.  · The estimated PA systolic pressure is 47 mmHg        Anesthesia Evaluation    I have reviewed the Patient Summary Reports.    I have reviewed the Nursing Notes.   I have reviewed the Medications.     Review of Systems  Anesthesia Hx:  No problems with previous Anesthesia  History of prior surgery of interest to airway management or planning: cervical fusion. Denies Family Hx of Anesthesia complications.   Denies Personal Hx of Anesthesia complications.   Social:  Former Smoker    Hematology/Oncology:      Hematuria   -- Anemia: Hematology Comments: Chronic anemia, easy bruising Current/Recent Cancer. Oncology Comments: Vulvar and ovarian cancer s/p surgery, radiation, chemo. Pt has right chest port that she  reports is non functional.     Cardiovascular:   Exercise tolerance: good.  Hypertension.  Functional Capacity good / => 4 METS    Pulmonary:   COPD, mild Sinus drainage for past few weeks, no recent fevers, abx, steorid use   Renal/:   Chronic Renal Disease, CRI LUE AVF but no history of HD, AVF with multiple vascular stents   Hepatic/GI:   IBS, short gut syndrome, chronic diarrhea; current nausea and abdominal pain;    Musculoskeletal:  Spine Disorders: cervical and lumbar    Neurological:   Peripheral Neuropathy (bilateral feet)    Endocrine:   Diabetes (previous hx, no medications currently) Hypothyroidism        Physical Exam  General:  Malnutrition    Airway/Jaw/Neck:  Airway Findings: Mouth Opening: Normal Mallampati: II  TM Distance: Normal, at least 6 cm  Jaw/Neck Findings:  Neck ROM: Normal ROM      Dental:  Dental Findings: (lower bridge) Upper Dentures    Chest/Lungs:  Chest/Lungs Findings: Clear to auscultation, Normal Respiratory Rate     Heart/Vascular:  Heart Findings: Rate: Normal  Rhythm: Regular Rhythm  Sounds: Normal  Vascular Findings:  Dialysis Access: AV Fistula LUE  Vascular Exam Findings: Right chest port (per patient, not functioning)       Skin:  Skin Findings:  Ecchymosis     Mental Status:  Mental Status Findings:  Cooperative, Alert and Oriented         Anesthesia Plan  Type of Anesthesia, risks & benefits discussed:  Anesthesia Type:  general  Patient's Preference:   Intra-op Monitoring Plan: standard ASA monitors and central line  Intra-op Monitoring Plan Comments:   Post Op Pain Control Plan: multimodal analgesia and IV/PO Opioids PRN  Post Op Pain Control Plan Comments:   Induction:   IV  Beta Blocker:  Patient is not currently on a Beta-Blocker (No further documentation required).       Informed Consent: Patient understands risks and agrees with Anesthesia plan.  Questions answered. Anesthesia consent signed with patient.  ASA Score: 3     Day of Surgery Review of History &  Physical:    H&P update referred to the surgeon.     Anesthesia Plan Notes: GETA      Ready For Surgery From Anesthesia Perspective.         Past Surgical History:   Procedure Laterality Date    ABDOMINAL SURGERY      AV FISTULA PLACEMENT Left 2018    BACK SURGERY      carpel tunnel surgery once on each hand      CERVICAL FUSION      x 4    cervix repair      CHOLECYSTECTOMY  2000    DIAGNOSTIC LAPAROSCOPY N/A 2/13/2020    Procedure: LAPAROSCOPY, DIAGNOSTIC;  Surgeon: Robbi Lovell III, MD;  Location: [facility];  Service: General;  Laterality: N/A;    HYSTERECTOMY  1980    ILEOSTOMY N/A 2/13/2020    Procedure: CREATION, ILEOSTOMY Loop;  Surgeon: Robbi Lovell III, MD;  Location: [facility] OR;  Service: General;  Laterality: N/A;    ILEOSTOMY CLOSURE N/A 5/18/2020    Procedure: CLOSURE, ILEOSTOMY;  Surgeon: Robbi Lovell III, MD;  Location: [facility] OR;  Service: General;  Laterality: N/A;    LUMBAR LAMINECTOMY      LYSIS OF ADHESIONS N/A 2/13/2020    Procedure: LYSIS, ADHESIONS;  Surgeon: Robbi Lovell III, MD;  Location: [facility] OR;  Service: General;  Laterality: N/A;    NECK SURGERY      PHLEBOGRAPHY  2/28/2020    Procedure: VENOGRAM;  Surgeon: Robbi Lovell III, MD;  Location: [facility] OR;  Service: General;;    REMOVAL OF VASCULAR ACCESS PORT Right 2/13/2020    Procedure: REMOVAL, VASCULAR ACCESS PORT;  Surgeon: Robbi Lovell III, MD;  Location: [facility];  Service: General;  Laterality: Right;    SHOULDER SURGERY      vulva cancer          Tobacco Use:  The patient  reports that she quit smoking about 19 years ago. Her smoking use included cigarettes. She has a 33.00 pack-year smoking history. She quit smokeless tobacco use about 20 years ago.     Results for orders placed or performed during the hospital encounter of 06/14/20   EKG 12-lead    Collection Time: 06/15/20  6:29 AM    Narrative    Test Reason : E87.5,    Vent. Rate : 070 BPM     Atrial Rate : 070 BPM     P-R Int : 162  ms          QRS Dur : 086 ms      QT Int : 380 ms       P-R-T Axes : 070 030 055 degrees     QTc Int : 410 ms    Normal sinus rhythm  Normal ECG  When compared with ECG of 03-MAR-2020 12:42,  Minimal criteria for Inferior infarct are no longer Present  T wave amplitude has increased in Anterior leads    Referred By: [email or web address]   SELF           Confirmed By:              Lab Results   Component Value Date    WBC 6.53 06/15/2020    HGB 8.2 (L) 06/15/2020    HCT 25.1 (L) 06/15/2020    MCV 98 06/15/2020     06/15/2020     BMP  Lab Results   Component Value Date     06/15/2020    K 5.7 (H) 06/15/2020     (H) 06/15/2020    CO2 20 (L) 06/15/2020    BUN 29 (H) 06/15/2020    CREATININE 2.1 (H) 06/15/2020    CALCIUM 8.1 (L) 06/15/2020    ANIONGAP 6 (L) 06/15/2020    ESTGFRAFRICA 26.9 (A) 06/15/2020    EGFRNONAA 23.3 (A) 06/15/2020           Pre-op Assessment    I have reviewed the Patient Summary Reports.     I have reviewed the Nursing Notes.    I have reviewed the Medications.     Review of Systems  Anesthesia Hx:  No problems with previous Anesthesia  Denies Family Hx of Anesthesia complications.   Denies Personal Hx of Anesthesia complications.   Social:  Non-Smoker, No Alcohol Use    Hematology/Oncology:  Hematology Normal       -- Anemia: acute on chronic Hematology Comments: Transfused recently     --  Cancer in past history (Vulvar cancinoma): s/p surgery, s/p radiation therapy and s/p chemotherapy     EENT/Dental:  EENT/Dental Normal Full upper and lower partial out.  Broken right lower molar   Cardiovascular:   Hypertension, well controlled    Renal/:   Chronic Renal Disease, CRI    Hepatic/GI:  Hepatic/GI Normal    Musculoskeletal:  Musculoskeletal Normal    Neurological:   Neuromuscular Disease, (LTAC)    Endocrine:   Hypothyroidism    Psych:  Psychiatric Normal           Physical Exam  General:  Well nourished    Airway/Jaw/Neck:  Airway Findings: Mouth Opening: Normal Tongue: Normal  General  Airway Assessment: Adult  Mallampati: II  Improves to II with phonation.  TM Distance: Normal, at least 6 cm  Jaw/Neck Findings:  Neck ROM: Extension Decreased, Mild      Dental:  Dental Findings: Upper partial dentures, Lower partial dentures   Chest/Lungs:  Chest/Lungs Findings: Clear to auscultation, Normal Respiratory Rate     Heart/Vascular:  Heart Findings: Rate: Normal  Rhythm: Regular Rhythm  Sounds: Normal        Mental Status:  Mental Status Findings:  Cooperative, Alert and Oriented         Anesthesia Plan  Type of Anesthesia, risks & benefits discussed:  Anesthesia Type:  general  Patient's Preference:   Intra-op Monitoring Plan: standard ASA monitors  Intra-op Monitoring Plan Comments:   Post Op Pain Control Plan: per primary service following discharge from PACU and IV/PO Opioids PRN  Post Op Pain Control Plan Comments:   Induction:   IV  Beta Blocker:  Patient is not currently on a Beta-Blocker (No further documentation required).       Informed Consent: Patient understands risks and agrees with Anesthesia plan.  Questions answered. Anesthesia consent signed with patient.  ASA Score: 3     Day of Surgery Review of History & Physical:    H&P update referred to the surgeon.     Anesthesia Plan Notes: GETA, no sux  Zofran, Pepcid, Benadryl 12.5mg  Ofirmev, Decadron 8mg        Ready For Surgery From Anesthesia Perspective.

## 2020-06-16 NOTE — ANESTHESIA PROCEDURE NOTES
Intubation  Performed by: Akash Ambrose CRNA  Authorized by: Artie Sheriff MD     Intubation:     Induction:  Intravenous    Intubated:  Postinduction    Attempts:  1    Attempted By:  Staff anesthesiologist    Method of Intubation:  Direct    Blade:  Shah 2    Laryngeal View Grade: Grade I - full view of chords      Difficult Airway Encountered?: No      Complications:  None    Airway Device:  Oral endotracheal tube    Airway Device Size:  7.5    Style/Cuff Inflation:  Cuffed    Inflation Amount (mL):  3    Tube secured:  21    Secured at:  The lips    Placement Verified By:  Capnometry    Complicating Factors:  None    Findings Post-Intubation:  BS equal bilateral and atraumatic/condition of teeth unchanged

## 2020-06-16 NOTE — ANESTHESIA POSTPROCEDURE EVALUATION
Anesthesia Post Evaluation    Patient: Shara Hutchins    Procedure(s) Performed: Procedure(s) (LRB):  CYSTOSCOPY, WITH BLADDER BIOPSY, WITH FULGURATION IF INDICATED (N/A)    Final Anesthesia Type: general    Patient location during evaluation: PACU  Patient participation: Yes- Able to Participate  Level of consciousness: awake and alert  Post-procedure vital signs: reviewed and stable  Pain management: adequate  Airway patency: patent  EMMA mitigation strategies: Multimodal analgesia, Extubation while patient is awake, Verification of full reversal of neuromuscular block and Extubation and recovery carried out in lateral, semiupright, or other nonsupine position  PONV status at discharge: No PONV  Anesthetic complications: no      Cardiovascular status: hemodynamically stable  Respiratory status: unassisted, spontaneous ventilation and room air  Hydration status: euvolemic  Follow-up not needed.          Vitals Value Taken Time   /68 06/16/20 1215   Temp 36.5 °C (97.7 °F) 06/16/20 1215   Pulse 62 06/16/20 1227   Resp 9 06/16/20 1227   SpO2 100 % 06/16/20 1227   Vitals shown include unvalidated device data.      No case tracking events are documented in the log.      Pain/Kvng Score: Pain Rating Prior to Med Admin: 3 (6/16/2020 12:04 PM)  Pain Rating Post Med Admin: 5 (6/16/2020  3:42 AM)  Kvng Score: 10 (6/16/2020 12:15 PM)

## 2020-06-16 NOTE — OP NOTE
[facility]  Operative Note    Date: 06/16/2020    Pre-Op Diagnosis: Recurrent gross hematuria    Post-Op Diagnosis: Recurrent gross hematuria, small bladder stone, bilateral mild hydroureteronephrosis    Procedure(s) Performed:   1.  Cystoscopy  2.  Bladder fulguration with bipolar resectoscope, 5 cm bladder  3.  Removal of small bladder stone  4.  Bilateral retrograde pyelograms     Specimen(s): Urine culture, urine cytology    Staff Surgeon: Damari Barber MD    Assistant Surgeon: Damari Barber Jr, MD    Anesthesia: General    Indications: Shara Hutchins is a 70 y.o. female with gross hematuria.     Patient with a history of HTN, vulvar cancer s/p chemo/XRT in 2013 and CKD who presented to the [facility] emergency department on 2/23/20 with abdominal pain, vaginal bleeding, DARCI  (3.8 from 2.5 baseline) and a 20 pound weight loss. Lee was placed on admission to monitor UOP. CTRSS 2/12 2/23 showed bilateral nonobstructing renal stones.      She underwent loop ileostomy on 2/13/20 for chronic diarrhea. Renal ultrasound on 2/26 showed no hydro, b renal stones and indeterminate artifact. She went to [facility] on 3/7 and returned and states gross hematuria started after this. Prior to admission pt denied having gross hematuria, urinary frequency, vaginal bleeding or frequent uti's. She did however state that her vulvar itching had returned and she was concerned about recurrence of cancer with no recent f/u by gyn onc. She has seen by  inpt and plan was for outpt f/u with .      She has been awaiting LTAC placement on 3/13. Consulted for persistent hematuria. The plan was for outpt cysto to eval for any bladder path not seen on ct and discussion of renal stones as well as discharging with lee. Her hematuria continued, ucx on 3/15 grew Klebsiella and she was started on rocephin and completed a 5d course. Around 3/17  she was given a voiding trial vs lee exchange? on 3/17 for larger 18fr.       She  continued to have hematuria requiring intermittent irrigation and 2 transfusions. Urology was re-consulted 3/24, Dr. Garcia asked them to remove the 18fr, place a 22 Amharic 3 way. CBI weaned off. She underwent ileostomy reversal on 5/18/20. Discharged from the hospital on 5/23/20.        Interval History     6/10/20: Patient presents today with hematuria for one day. She was prescribed Pyridium and Macrodantin for presumed UTI.       6/16/20: Patient presented today [facility] ED on 6/14/20 with worsening gross hematuria. Urology consulted to assist with management. States she became light-headed and dizzy at home due to anemia.      She denies any fever, chills, nausea, emesis, bone pain, weight loss or  trauma. Still with same catheter due to difficulty exchanging catheter.    Findings: External genitalia consistent with history of vulvar cancer s/p chemo/XRT. Cystoscopy revealed posterior areas of erythema that did not appear to be suspicious for malignancy and were consistent with her history of radiation. The posterior bladder erythematous areas were fulgurated with the resectoscope. There appeared to be excellent hemostasis. Bilateral retrograde pyelograms were then performed which revealed bilateral mild hydroureteronephrosis and no obvious filling defects. There was mild delayed drainage bilaterally. There was a small bladder stone on cystoscopy that was removed. 20 Citizen of [place] 3-way lee catheter placed back into bladder and connected to continuous bladder irrigation. Urine sent for culture and urine cytology.     Estimated Blood Loss: min    Drains: None    Procedure in Detail:  After risks, benefits and possible complications of cystoscopy were explained, the patient elected to undergo the procedure and informed consent was obtained. All questions were answered in the marek-operative area. The patient was transferred to the cystoscopy suite and placed in the supine position.  SCDs were applied and working.   MAC anesthesia was administered.  Once the patient was adequately sedated, she was placed in the dorsal lithotomy position and prepped and draped in the usual sterile fashion.  Time out was performed, marek-procedural antibiotics were confirmed.     A rigid cystoscope in a 22 Fr sheath was introduced into the bladder per urethra. This passed easily.  The entire urethra was visualized which showed no masses or strictures.  The right and left ureteral orifices were identified in the normal anatomic position and were seen effluxing clear urine.  Formal cystoscopy was performed which revealed no obvious masses. There was a small bladder stone identified near that trigone that was removed through the scope. Patient was found to have several erythematous areas consistent with radiation cystitis and no obvious malignancy.     A 26-Japanese sheath resectoscope was placed into the bladder using a visual obturator. The visual obturator was exchanged for the resecting mechanism. The ureteral orifices were identified. The posterior bladder was cauterized using the bipolar loop. There was excellent hemostasis. The resectoscope was then exchanged for the rigid cystoscope. The right and left ureters were cannulated with the 5 Japanese open-ended catheter and a retrograde pyelogram was performed which revealed bilateral mild hydroureteronephrosis and no obvious filling defects. There was mild delayed drainage bilaterally of both kidneys.     A 20 Japanese 3-way lee catheter placed back into bladder and connected to continuous bladder irrigation. Urine sent for culture and urine cytology.     The patient tolerated the procedure well and was transferred to recovery in stable condition.    Disposition:  The patient will follow up with me in 1 week for voiding trial.    Discharge home today status post uncomplicated procedure as above  Diet - resume home diet  Follow up: Patient will be transferred back to the floor on CBI. If urine remains  clear overnight, she can have the irrigation turned off at 6 AM. If remains clear off CBI, ok to discharge from a urologic stand-point with the catheter in place. She will follow up in 1 week for catheter removal. Need to follow up with urine culture and cytology as an outpatient. Continue to hold any blood thinners if safe medically.       Damari Barber Jr, MD

## 2020-06-16 NOTE — ANESTHESIA POSTPROCEDURE EVALUATION
Anesthesia Post Evaluation    Patient: Shara Hutchins    Procedure(s) Performed: Procedure(s) (LRB):  CYSTOSCOPY, WITH BLADDER BIOPSY, WITH FULGURATION IF INDICATED (N/A)    [facility] Anesthesia Post Op Evaluation      Vitals Value Taken Time   /68 06/16/20 1215   Temp 36.5 °C (97.7 °F) 06/16/20 1215   Pulse 66 06/16/20 1220   Resp 33 06/16/20 1220   SpO2 99 % 06/16/20 1220   Vitals shown include unvalidated device data.      No case tracking events are documented in the log.      Pain/Kvng Score: Pain Rating Prior to Med Admin: 3 (6/16/2020 12:04 PM)  Pain Rating Post Med Admin: 5 (6/16/2020  3:42 AM)  Kvng Score: 10 (6/16/2020 12:15 PM)

## 2020-06-17 LAB
ANION GAP SERPL CALC-SCNC: 11 MMOL/L (ref 8–16)
ANION GAP SERPL CALC-SCNC: 8 MMOL/L (ref 8–16)
BASOPHILS # BLD AUTO: 0.02 K/UL (ref 0–0.2)
BASOPHILS NFR BLD: 0.2 % (ref 0–1.9)
BUN SERPL-MCNC: 29 MG/DL (ref 8–23)
BUN SERPL-MCNC: 30 MG/DL (ref 8–23)
CALCIUM SERPL-MCNC: 8.5 MG/DL (ref 8.7–10.5)
CALCIUM SERPL-MCNC: 8.5 MG/DL (ref 8.7–10.5)
CHLORIDE SERPL-SCNC: 109 MMOL/L (ref 95–110)
CHLORIDE SERPL-SCNC: 113 MMOL/L (ref 95–110)
CO2 SERPL-SCNC: 18 MMOL/L (ref 23–29)
CO2 SERPL-SCNC: 19 MMOL/L (ref 23–29)
CREAT SERPL-MCNC: 1.9 MG/DL (ref 0.5–1.4)
CREAT SERPL-MCNC: 2.1 MG/DL (ref 0.5–1.4)
DIFFERENTIAL METHOD: ABNORMAL
EOSINOPHIL # BLD AUTO: 0 K/UL (ref 0–0.5)
EOSINOPHIL NFR BLD: 0.1 % (ref 0–8)
ERYTHROCYTE [DISTWIDTH] IN BLOOD BY AUTOMATED COUNT: 15.9 % (ref 11.5–14.5)
EST. GFR  (AFRICAN AMERICAN): 26.9 ML/MIN/1.73 M^2
EST. GFR  (AFRICAN AMERICAN): 30.4 ML/MIN/1.73 M^2
EST. GFR  (NON AFRICAN AMERICAN): 23.3 ML/MIN/1.73 M^2
EST. GFR  (NON AFRICAN AMERICAN): 26.3 ML/MIN/1.73 M^2
GLUCOSE SERPL-MCNC: 106 MG/DL (ref 70–110)
GLUCOSE SERPL-MCNC: 88 MG/DL (ref 70–110)
HCT VFR BLD AUTO: 24.6 % (ref 37–48.5)
HCT VFR BLD AUTO: 26.8 % (ref 37–48.5)
HGB BLD-MCNC: 7.9 G/DL (ref 12–16)
HGB BLD-MCNC: 8.6 G/DL (ref 12–16)
IMM GRANULOCYTES # BLD AUTO: 0.03 K/UL (ref 0–0.04)
IMM GRANULOCYTES NFR BLD AUTO: 0.3 % (ref 0–0.5)
LYMPHOCYTES # BLD AUTO: 1.3 K/UL (ref 1–4.8)
LYMPHOCYTES NFR BLD: 14.4 % (ref 18–48)
MAGNESIUM SERPL-MCNC: 1.9 MG/DL (ref 1.6–2.6)
MCH RBC QN AUTO: 32.3 PG (ref 27–31)
MCHC RBC AUTO-ENTMCNC: 32.1 G/DL (ref 32–36)
MCV RBC AUTO: 101 FL (ref 82–98)
MONOCYTES # BLD AUTO: 0.5 K/UL (ref 0.3–1)
MONOCYTES NFR BLD: 5.3 % (ref 4–15)
NEUTROPHILS # BLD AUTO: 6.9 K/UL (ref 1.8–7.7)
NEUTROPHILS NFR BLD: 79.7 % (ref 38–73)
NRBC BLD-RTO: 0 /100 WBC
PLATELET # BLD AUTO: 258 K/UL (ref 150–350)
PMV BLD AUTO: 9.7 FL (ref 9.2–12.9)
POTASSIUM SERPL-SCNC: 5.2 MMOL/L (ref 3.5–5.1)
POTASSIUM SERPL-SCNC: 5.6 MMOL/L (ref 3.5–5.1)
RBC # BLD AUTO: 2.66 M/UL (ref 4–5.4)
SODIUM SERPL-SCNC: 138 MMOL/L (ref 136–145)
SODIUM SERPL-SCNC: 140 MMOL/L (ref 136–145)
WBC # BLD AUTO: 8.66 K/UL (ref 3.9–12.7)

## 2020-06-17 PROCEDURE — 25000003 PHARM REV CODE 250: Performed by: FAMILY MEDICINE

## 2020-06-17 PROCEDURE — 12000002 HC ACUTE/MED SURGE SEMI-PRIVATE ROOM

## 2020-06-17 PROCEDURE — 83735 ASSAY OF MAGNESIUM: CPT

## 2020-06-17 PROCEDURE — 63600175 PHARM REV CODE 636 W HCPCS: Performed by: UROLOGY

## 2020-06-17 PROCEDURE — 25000003 PHARM REV CODE 250: Performed by: UROLOGY

## 2020-06-17 PROCEDURE — 36415 COLL VENOUS BLD VENIPUNCTURE: CPT

## 2020-06-17 PROCEDURE — 85025 COMPLETE CBC W/AUTO DIFF WBC: CPT

## 2020-06-17 PROCEDURE — 80048 BASIC METABOLIC PNL TOTAL CA: CPT

## 2020-06-17 PROCEDURE — 63600175 PHARM REV CODE 636 W HCPCS: Performed by: FAMILY MEDICINE

## 2020-06-17 RX ORDER — FUROSEMIDE 10 MG/ML
40 INJECTION INTRAMUSCULAR; INTRAVENOUS ONCE
Status: COMPLETED | OUTPATIENT
Start: 2020-06-17 | End: 2020-06-17

## 2020-06-17 RX ORDER — BUTALBITAL, ACETAMINOPHEN AND CAFFEINE 50; 325; 40 MG/1; MG/1; MG/1
1 TABLET ORAL EVERY 6 HOURS PRN
Status: DISCONTINUED | OUTPATIENT
Start: 2020-06-17 | End: 2020-06-18 | Stop reason: HOSPADM

## 2020-06-17 RX ORDER — SODIUM CHLORIDE 9 MG/ML
INJECTION, SOLUTION INTRAVENOUS CONTINUOUS
Status: DISCONTINUED | OUTPATIENT
Start: 2020-06-17 | End: 2020-06-18 | Stop reason: HOSPADM

## 2020-06-17 RX ADMIN — CALCITRIOL CAPSULES 0.25 MCG 0.5 MCG: 0.25 CAPSULE ORAL at 08:06

## 2020-06-17 RX ADMIN — ONDANSETRON 4 MG: 2 INJECTION INTRAMUSCULAR; INTRAVENOUS at 08:06

## 2020-06-17 RX ADMIN — MIRTAZAPINE 15 MG: 15 TABLET, FILM COATED ORAL at 08:06

## 2020-06-17 RX ADMIN — SODIUM POLYSTYRENE SULFONATE 15 G: 15 SUSPENSION ORAL; RECTAL at 06:06

## 2020-06-17 RX ADMIN — LEVOTHYROXINE SODIUM 88 MCG: 88 TABLET ORAL at 05:06

## 2020-06-17 RX ADMIN — DICYCLOMINE HYDROCHLORIDE 10 MG: 10 CAPSULE ORAL at 02:06

## 2020-06-17 RX ADMIN — FERROUS SULFATE TAB 325 MG (65 MG ELEMENTAL FE) 325 MG: 325 (65 FE) TAB at 08:06

## 2020-06-17 RX ADMIN — TRAMADOL HYDROCHLORIDE 50 MG: 50 TABLET, FILM COATED ORAL at 08:06

## 2020-06-17 RX ADMIN — DILTIAZEM HYDROCHLORIDE 120 MG: 120 CAPSULE, COATED, EXTENDED RELEASE ORAL at 08:06

## 2020-06-17 RX ADMIN — BUTALBITAL, ACETAMINOPHEN AND CAFFEINE 1 TABLET: 50; 325; 40 TABLET ORAL at 04:06

## 2020-06-17 RX ADMIN — SODIUM CHLORIDE: 0.9 INJECTION, SOLUTION INTRAVENOUS at 11:06

## 2020-06-17 RX ADMIN — SODIUM BICARBONATE 650 MG TABLET 650 MG: at 08:06

## 2020-06-17 RX ADMIN — MAGNESIUM OXIDE 800 MG: 400 TABLET ORAL at 08:06

## 2020-06-17 RX ADMIN — CHOLESTYRAMINE 4 G: 4 POWDER, FOR SUSPENSION ORAL at 08:06

## 2020-06-17 RX ADMIN — DICYCLOMINE HYDROCHLORIDE 10 MG: 10 CAPSULE ORAL at 08:06

## 2020-06-17 RX ADMIN — CEFTRIAXONE SODIUM 1 G: 1 INJECTION, POWDER, FOR SOLUTION INTRAMUSCULAR; INTRAVENOUS at 04:06

## 2020-06-17 RX ADMIN — ONDANSETRON 4 MG: 2 INJECTION INTRAMUSCULAR; INTRAVENOUS at 05:06

## 2020-06-17 RX ADMIN — MUPIROCIN: 20 OINTMENT TOPICAL at 08:06

## 2020-06-17 RX ADMIN — OXYBUTYNIN CHLORIDE 10 MG: 10 TABLET, EXTENDED RELEASE ORAL at 08:06

## 2020-06-17 RX ADMIN — FUROSEMIDE 40 MG: 10 INJECTION, SOLUTION INTRAMUSCULAR; INTRAVENOUS at 11:06

## 2020-06-17 RX ADMIN — ONDANSETRON 4 MG: 2 INJECTION INTRAMUSCULAR; INTRAVENOUS at 12:06

## 2020-06-17 RX ADMIN — TRAMADOL HYDROCHLORIDE 50 MG: 50 TABLET, FILM COATED ORAL at 12:06

## 2020-06-17 RX ADMIN — TRAMADOL HYDROCHLORIDE 50 MG: 50 TABLET, FILM COATED ORAL at 05:06

## 2020-06-17 NOTE — PROGRESS NOTES
[facility] Medicine  Progress Note    Patient Name: Shara Hutchins  MRN: [id number]  Patient Class: IP- Inpatient   Admission Date: 6/14/2020 12:24 PM  Attending Physician: Maryana Morgan MD  Primary Care Provider: April Horton MD  Face-to-Face encounter date: 06/17/2020    Assessment & Plan:   Shara Hutchins is a 70 y.o. female with:    Symptomatic Anemia due to Reoccurring Gross Hematuria  DARCI on CKD 3  Hyperkalemia  Complicated by Enterobacter UTI  - cystoscopy this admission  - CBI overnight; urine clear  - rocephin  - IVFs  - kayexalate refused by pt yesterday  - lasix cautiously given x 1 dose  - urology recs noted and appreciated    Other chronic conditions:  Home meds as appropriate  Electrolyte derangement:  Trending BMP, Mg; Replacement prn  DVT ppx:  SCDs  FULL CODE    Discharge Planning:     Home at discharge.      Subjective:      HPI:    Shara Hutchins is a 70 y.o. White female who  has a past medical history of Chronic kidney disease, stage III (moderate) (1/7/2018), Diabetes mellitus, type 2, Fatigue, Hypertension, Iron deficiency anemia due to chronic blood loss (1/7/2018), and Vulvar cancer (1/7/2019).. The patient presented to [facility] on 6/14/2020 with a primary complaint of Hematuria (X 4 DAYS, FEELING WEAK NOW) and Abdominal Pain (@ OSTOMY SITE)     History was obtained from the patient and ER physician Sign-out. Patient presents to the ED with the complaint of hematuria x 4 days and she is now feeling weak. She has a chronic indwelling catheter since March. She is followed by Urology. She saw urology 4 days ago for onset of gross bloody urine. She was started on Macrobid and pyridium at that time for UTI. She reports that hematuria persisted and she now feels weak. She also endorses mild pain to lower abdomen. She denies fever, chills, chest pain, sob, rectal bleeding, melena, numbness, tingling, or LOC.      In the ED, VSS.  CBC with h/h 7.6/24.2 which is  "decreased from few days prior. CMP with K+ 5.3 and worsening kidney function. Urology was consulted who recommended the patient be started on CBI.      Decision to admit was taken and patient was informed about the plan of care.     Hospital Course:    6/16:  Hematuria continues.  Cystoscopy today.  No CP.    6/17:  Hematuria resolved.  Urine clear.  Pt refused kayexalate yesterday and potassium remains elevated.  Was avoiding use of lasix due to DARCI but will cautiously give one dose now.  Pt denies CP, SOB, or dizziness.    Review of Systems   All systems reviewed and are negative except as noted per above.  Objective:     Physical Exam  /66   Pulse 64   Temp 98.2 °F (36.8 °C) (Oral)   Resp 13   Ht 5' 7" (1.702 m)   Wt 56.7 kg (125 lb)   SpO2 98%   Breastfeeding No   BMI 19.58 kg/m²     Gen: alert, responsive   HEENT:  Eyes WITH NO pallor  External ears with no lesions  Nares patent  Mouth - lips chapped  CV: RRR  Lungs: CTA B/L  Abd: +BS, soft, NT, ND  Ext: no atrophy or edema  Skin: warm, dry  Neuro: grossly intact  Psych: pleasant    Recent Labs   Lab 06/17/20  0537   WBC 8.66   HGB 8.6*   HCT 26.8*        Recent Labs   Lab 06/17/20  1449   CALCIUM 8.5*      K 5.2*   CO2 18*      BUN 29*   CREATININE 2.1*     No results found for: POCTGLUCOSE   Microbiology Results (last 7 days)     Procedure Component Value Units Date/Time    Urine Culture High Risk [[id number]] Collected: 06/16/20 1158    Order Status: Completed Specimen: Urine, Clean Catch Updated: 06/17/20 0854     Urine Culture, Routine No growth to date    Narrative:      Indicated criteria for high risk culture:->Prior to urologic  procedures    Urine Culture High Risk [[id number]]  (Abnormal)  (Susceptibility) Collected: 06/14/20 1651    Order Status: Completed Specimen: Urine, Catheterized Updated: 06/16/20 0637     Urine Culture, Routine ENTEROBACTER AEROGENES  >100,000 cfu/ml      Narrative:      Indicated criteria for " high risk culture:->Prior to urologic  procedures    MRSA Screen by PCR [[id number]]  (Abnormal) Collected: 06/14/20 1520    Order Status: Completed Specimen: Nasopharyngeal Swab from Nasal Updated: 06/14/20 1821     MRSA SCREEN BY PCR Positive     Comment: MRSA critical result(s) called and verbal readback obtained from   TOBY Narayan by CD3 06/14/2020 18:21         Narrative:         MRSA critical result(s) called and verbal readback obtained from   TOBY Narayan by CD3 06/14/2020 18:21        FL Retrograde Pyelogram   Final Result      3 mm distal left ureteral stone      Normal right retrograde pyelogram         Electronically signed by: Meredith Colunga MD   Date:    06/16/2020   Time:    11:52      X-Ray Chest AP Portable   Final Result          All labs, images, and other studies - including those not included in this note -- were reviewed personally by me.    Inpatient medications  Scheduled Meds:   calcitRIOL  0.5 mcg Oral Daily    cefTRIAXone (ROCEPHIN) IVPB  1 g Intravenous Q24H    cholestyramine-aspartame  4 g Oral BID    dicyclomine  10 mg Oral TID    diltiaZEM  120 mg Oral Daily    ferrous sulfate  325 mg Oral Daily    levothyroxine  88 mcg Oral Before breakfast    magnesium oxide  800 mg Oral Daily    mirtazapine  15 mg Oral QHS    mupirocin   Nasal BID    oxybutynin  10 mg Oral Daily    sodium bicarbonate  650 mg Oral BID    sodium polystyrene  15 g Oral Q6H     Continuous Infusions:   sodium chloride 0.9% 125 mL/hr at 06/17/20 1605     PRN Meds:.sodium chloride, acetaminophen, benzocaine, butalbital-acetaminophen-caffeine -40 mg, ondansetron, sodium chloride 0.9%, sodium chloride 0.9%, traMADoL    Above encounter included review of the medical records, interviewing and examining the patient face-to-face, discussion with family and other health care providers, ordering and interpreting lab/test results and formulating a plan of care.     Maryana Morgan MD  [facility]  Hospitalist

## 2020-06-18 VITALS
WEIGHT: 125 LBS | TEMPERATURE: 99 F | HEART RATE: 85 BPM | HEIGHT: 67 IN | DIASTOLIC BLOOD PRESSURE: 69 MMHG | BODY MASS INDEX: 19.62 KG/M2 | RESPIRATION RATE: 16 BRPM | SYSTOLIC BLOOD PRESSURE: 107 MMHG | OXYGEN SATURATION: 98 %

## 2020-06-18 PROBLEM — N18.4 STAGE 4 CHRONIC KIDNEY DISEASE: Status: ACTIVE | Noted: 2020-06-18

## 2020-06-18 PROBLEM — D64.9 SYMPTOMATIC ANEMIA: Status: RESOLVED | Noted: 2020-06-14 | Resolved: 2020-06-18

## 2020-06-18 LAB
ABO + RH BLD: NORMAL
ANION GAP SERPL CALC-SCNC: 11 MMOL/L (ref 8–16)
BASOPHILS # BLD AUTO: 0.02 K/UL (ref 0–0.2)
BASOPHILS NFR BLD: 0.3 % (ref 0–1.9)
BLD GP AB SCN CELLS X3 SERPL QL: NORMAL
BLD PROD TYP BPU: NORMAL
BLOOD UNIT EXPIRATION DATE: NORMAL
BLOOD UNIT TYPE CODE: 6200
BLOOD UNIT TYPE: NORMAL
BUN SERPL-MCNC: 27 MG/DL (ref 8–23)
CALCIUM SERPL-MCNC: 8.4 MG/DL (ref 8.7–10.5)
CHLORIDE SERPL-SCNC: 111 MMOL/L (ref 95–110)
CO2 SERPL-SCNC: 20 MMOL/L (ref 23–29)
CODING SYSTEM: NORMAL
CREAT SERPL-MCNC: 2.1 MG/DL (ref 0.5–1.4)
DIFFERENTIAL METHOD: ABNORMAL
DISPENSE STATUS: NORMAL
EOSINOPHIL # BLD AUTO: 0.2 K/UL (ref 0–0.5)
EOSINOPHIL NFR BLD: 2.2 % (ref 0–8)
ERYTHROCYTE [DISTWIDTH] IN BLOOD BY AUTOMATED COUNT: 16.3 % (ref 11.5–14.5)
EST. GFR  (AFRICAN AMERICAN): 26.9 ML/MIN/1.73 M^2
EST. GFR  (NON AFRICAN AMERICAN): 23.3 ML/MIN/1.73 M^2
GLUCOSE SERPL-MCNC: 69 MG/DL (ref 70–110)
HCT VFR BLD AUTO: 25.3 % (ref 37–48.5)
HGB BLD-MCNC: 7.9 G/DL (ref 12–16)
IMM GRANULOCYTES # BLD AUTO: 0.02 K/UL (ref 0–0.04)
IMM GRANULOCYTES NFR BLD AUTO: 0.3 % (ref 0–0.5)
LYMPHOCYTES # BLD AUTO: 1.9 K/UL (ref 1–4.8)
LYMPHOCYTES NFR BLD: 27.7 % (ref 18–48)
MAGNESIUM SERPL-MCNC: 1.6 MG/DL (ref 1.6–2.6)
MCH RBC QN AUTO: 31.5 PG (ref 27–31)
MCHC RBC AUTO-ENTMCNC: 31.2 G/DL (ref 32–36)
MCV RBC AUTO: 101 FL (ref 82–98)
MONOCYTES # BLD AUTO: 0.5 K/UL (ref 0.3–1)
MONOCYTES NFR BLD: 7 % (ref 4–15)
NEUTROPHILS # BLD AUTO: 4.3 K/UL (ref 1.8–7.7)
NEUTROPHILS NFR BLD: 62.5 % (ref 38–73)
NRBC BLD-RTO: 0 /100 WBC
NUM UNITS TRANS PACKED RBC: NORMAL
PLATELET # BLD AUTO: 232 K/UL (ref 150–350)
PMV BLD AUTO: 9.6 FL (ref 9.2–12.9)
POTASSIUM SERPL-SCNC: 4.5 MMOL/L (ref 3.5–5.1)
RBC # BLD AUTO: 2.51 M/UL (ref 4–5.4)
SODIUM SERPL-SCNC: 142 MMOL/L (ref 136–145)
WBC # BLD AUTO: 6.9 K/UL (ref 3.9–12.7)

## 2020-06-18 PROCEDURE — 85025 COMPLETE CBC W/AUTO DIFF WBC: CPT

## 2020-06-18 PROCEDURE — 63600175 PHARM REV CODE 636 W HCPCS: Performed by: UROLOGY

## 2020-06-18 PROCEDURE — 25000003 PHARM REV CODE 250: Performed by: FAMILY MEDICINE

## 2020-06-18 PROCEDURE — 36415 COLL VENOUS BLD VENIPUNCTURE: CPT

## 2020-06-18 PROCEDURE — 86901 BLOOD TYPING SEROLOGIC RH(D): CPT

## 2020-06-18 PROCEDURE — 80048 BASIC METABOLIC PNL TOTAL CA: CPT

## 2020-06-18 PROCEDURE — 86920 COMPATIBILITY TEST SPIN: CPT

## 2020-06-18 PROCEDURE — 25000003 PHARM REV CODE 250: Performed by: UROLOGY

## 2020-06-18 PROCEDURE — 83735 ASSAY OF MAGNESIUM: CPT

## 2020-06-18 PROCEDURE — P9016 RBC LEUKOCYTES REDUCED: HCPCS

## 2020-06-18 RX ORDER — CEFDINIR 300 MG/1
300 CAPSULE ORAL 2 TIMES DAILY
Qty: 12 CAPSULE | Refills: 0 | Status: SHIPPED | OUTPATIENT
Start: 2020-06-18 | End: 2020-06-24

## 2020-06-18 RX ORDER — HYDROCODONE BITARTRATE AND ACETAMINOPHEN 500; 5 MG/1; MG/1
TABLET ORAL
Status: DISCONTINUED | OUTPATIENT
Start: 2020-06-18 | End: 2020-06-18 | Stop reason: HOSPADM

## 2020-06-18 RX ADMIN — TRAMADOL HYDROCHLORIDE 50 MG: 50 TABLET, FILM COATED ORAL at 06:06

## 2020-06-18 RX ADMIN — CHOLESTYRAMINE 4 G: 4 POWDER, FOR SUSPENSION ORAL at 08:06

## 2020-06-18 RX ADMIN — ONDANSETRON 4 MG: 2 INJECTION INTRAMUSCULAR; INTRAVENOUS at 08:06

## 2020-06-18 RX ADMIN — OXYBUTYNIN CHLORIDE 10 MG: 10 TABLET, EXTENDED RELEASE ORAL at 08:06

## 2020-06-18 RX ADMIN — SODIUM BICARBONATE 650 MG TABLET 650 MG: at 08:06

## 2020-06-18 RX ADMIN — BUTALBITAL, ACETAMINOPHEN AND CAFFEINE 1 TABLET: 50; 325; 40 TABLET ORAL at 11:06

## 2020-06-18 RX ADMIN — LEVOTHYROXINE SODIUM 88 MCG: 88 TABLET ORAL at 05:06

## 2020-06-18 RX ADMIN — DICYCLOMINE HYDROCHLORIDE 10 MG: 10 CAPSULE ORAL at 08:06

## 2020-06-18 RX ADMIN — MUPIROCIN: 20 OINTMENT TOPICAL at 08:06

## 2020-06-18 RX ADMIN — DILTIAZEM HYDROCHLORIDE 120 MG: 120 CAPSULE, COATED, EXTENDED RELEASE ORAL at 08:06

## 2020-06-18 RX ADMIN — CALCITRIOL CAPSULES 0.25 MCG 0.5 MCG: 0.25 CAPSULE ORAL at 08:06

## 2020-06-18 RX ADMIN — FERROUS SULFATE TAB 325 MG (65 MG ELEMENTAL FE) 325 MG: 325 (65 FE) TAB at 08:06

## 2020-06-18 RX ADMIN — SODIUM CHLORIDE: 0.9 INJECTION, SOLUTION INTRAVENOUS at 12:06

## 2020-06-18 RX ADMIN — TRAMADOL HYDROCHLORIDE 50 MG: 50 TABLET, FILM COATED ORAL at 02:06

## 2020-06-18 RX ADMIN — MAGNESIUM OXIDE 800 MG: 400 TABLET ORAL at 08:06

## 2020-06-18 RX ADMIN — ONDANSETRON 4 MG: 2 INJECTION INTRAMUSCULAR; INTRAVENOUS at 02:06

## 2020-06-18 NOTE — PLAN OF CARE
06/18/20 1536   Discharge Reassessment   Assessment Type Final Discharge Note   Anticipated Discharge Disposition Home     SW reviewed d/c orders - no CM needs noted.

## 2020-06-18 NOTE — DISCHARGE SUMMARY
[facility] Medicine  Discharge Summary      Patient Name: Shara Hutchins  MRN: [id number]  Admission Date: 6/14/2020  Hospital Length of Stay: 4 days  Discharge Date and Time: 6/18/2020  3:39 PM  Discharging Provider: Maryana Morgan MD  Primary Care Provider: April Horton MD    HPI:   Shara Hutchins is a 70 y.o. White female who  has a past medical history of Chronic kidney disease, stage III (moderate) (1/7/2018), Diabetes mellitus, type 2, Fatigue, Hypertension, Iron deficiency anemia due to chronic blood loss (1/7/2018), and Vulvar cancer (1/7/2019).. The patient presented to [facility] on 6/14/2020 with a primary complaint of Hematuria (X 4 DAYS, FEELING WEAK NOW) and Abdominal Pain (@ OSTOMY SITE)    History was obtained from the patient and ER physician Sign-out. Patient presents to the ED with the complaint of hematuria x 4 days and she is now feeling weak. She has a chronic indwelling catheter since March. She is followed by Urology. She saw urology 4 days ago for onset of gross bloody urine. She was started on Macrobid and pyridium at that time for UTI. She reports that hematuria persisted and she now feels weak. She also endorses mild pain to lower abdomen. She denies fever, chills, chest pain, sob, rectal bleeding, melena, numbness, tingling, or LOC.      In the ED, VSS.  CBC with h/h 7.6/24.2 which is decreased from few days prior. CMP with K+ 5.3 and worsening kidney function. Urology was consulted who recommended the patient be started on CBI.      Decision to admit was taken and patient was informed about the plan of care.     Procedure(s) (LRB):  FULGURATION, BLADDER (N/A)  CYSTOSCOPY, WITH RETROGRADE PYELOGRAM  REMOVAL, CALCULUS, BLADDER (N/A)      Hospital Course:   6/15/2020  Ms Hutchins has a triple lumen catheter for bladder irrigation per urology. She denies pain, fever chills, nausea or vomiting.  She has a lactose fermenting GNR in her urine  LAST urine  "culture 3/2020  Klebsiella pneumoniae       CULTURE, URINE     Amox/K Clav'ate <=8/4 mcg/mL Sensitive     Amp/Sulbactam >16/8 mcg/mL Resistant     Cefazolin <=8 mcg/mL Sensitive     Cefepime <=4 mcg/mL Sensitive     Ceftriaxone <=8 mcg/mL Sensitive     Ciprofloxacin <=1 mcg/mL Sensitive     Ertapenem <=2 mcg/mL Sensitive     Gentamicin <=4 mcg/mL Sensitive     Levofloxacin <=2 mcg/mL Sensitive     Meropenem <=1 mcg/mL Sensitive     Nitrofurantoin 64 mcg/mL Intermediate     Piperacillin/Tazo <=16 mcg/mL Sensitive     Tetracycline <=4 mcg/mL Sensitive     Tobramycin <=4 mcg/mL Sensitive     Trimeth/Sulfa <=2/38 mcg/mL Sensitive                   6/16:  Hematuria continues.  Cystoscopy today.  No CP.     6/17:  Hematuria resolved.  Urine clear.  Pt refused kayexalate yesterday and potassium remains elevated.  Was avoiding use of lasix due to DARCI but will cautiously give one dose now.  Pt denies CP, SOB, or dizziness.    6/18:  Hyperkalemia resolved.  sCr stable.  Pt considered stable for discharge and will follow up outpt with urology, nephrology, and her PCP.    Symptomatic Anemia due to Reoccurring Gross Hematuria  DARCI on CKD 3  Hyperkalemia, resolved  Complicated by Enterobacter UTI  - cystoscopy this admission  - CBI overnight; urine clear  - rocephin  - IVFs  - pRBC today (6/18/2020)   - urology recs noted and appreciated     Other chronic conditions:  Home meds as appropriate  Electrolyte derangement:  Trending BMP, Mg; Replacement prn  DVT ppx:  SCDs  FULL CODE    DISCHARGE PHYSICAL EXAM  /69 (BP Location: Right arm, Patient Position: Lying)   Pulse 85   Temp 98.6 °F (37 °C) (Oral)   Resp 16   Ht 5' 7" (1.702 m)   Wt 56.7 kg (125 lb)   SpO2 98%   Breastfeeding No   BMI 19.58 kg/m²   Gen: alert, responsive  HEENT:  Eyes - no pallor  External ears with no lesions  Nares patent  Mouth - lips chapped  CV: RRR  Lungs: CTA B/L  Abd: +BS, soft, NT, ND  Ext: no atrophy or edema  Skin: warm, dry  Neuro: " grossly intact  Psych: pleasant    Consults:   Consults (From admission, onward)        Status Ordering Provider     Inpatient consult to Urology  Once     Provider:  Arben Campos MD    Completed LUDY BRIGGS     Inpatient consult to Urology  Once     Provider:  Ji Tolbert MD    Completed EDILMA YUSUF        Final Active Diagnoses:    Diagnosis Date Noted POA    PRINCIPAL PROBLEM:  Stage 4 chronic kidney disease [N18.4] 06/18/2020 Yes      Problems Resolved During this Admission:    Diagnosis Date Noted Date Resolved POA    Symptomatic anemia [D64.9] 06/14/2020 06/18/2020 Yes       Discharged Condition: stable    Disposition: Home or Self Care    Follow Up:  WITH PCP AND OUTPT NEPHROLOGY.    Medications:  Reconciled Home Medications:      Medication List      START taking these medications    cefdinir 300 MG capsule  Commonly known as: OMNICEF  Take 1 capsule (300 mg total) by mouth 2 (two) times daily. for 6 days        CONTINUE taking these medications    calcitRIOL 0.5 MCG Cap  Commonly known as: ROCALTROL  Take 0.5 mcg by mouth once daily.     cholestyramine-aspartame 4 gram Pwpk  Commonly known as: QUESTRAN LIGHT  Take 1 packet (4 g total) by mouth 2 (two) times daily. for 21 days     dicyclomine 10 MG capsule  Commonly known as: BENTYL  Take 10 mg by mouth 3 (three) times daily.     diltiaZEM 120 MG Cp24  Commonly known as: CARDIZEM CD  Take 120 mg by mouth once daily.     ferrous sulfate 324 mg (65 mg iron) Tbec  Take 325 mg by mouth once daily.     levothyroxine 88 MCG tablet  Commonly known as: SYNTHROID  Take 1 tablet (88 mcg total) by mouth before breakfast.     LORazepam 0.5 MG tablet  Commonly known as: ATIVAN  Take 1 tablet (0.5 mg total) by mouth every 12 (twelve) hours as needed for Anxiety.     magnesium oxide 400 mg (241.3 mg magnesium) tablet  Commonly known as: MAG-OX  Take 800 mg by mouth once daily.     mirtazapine 15 MG tablet  Commonly known as: REMERON  Take 1 tablet  (15 mg total) by mouth every evening.     oxybutynin 10 MG 24 hr tablet  Commonly known as: DITROPAN-XL  Take 1 tablet (10 mg total) by mouth once daily.     sodium bicarbonate 325 MG tablet  Take 650 mg by mouth 2 (two) times daily.     traMADoL 50 mg tablet  Commonly known as: ULTRAM  Take 1 tablet (50 mg total) by mouth every 4 (four) hours as needed for Pain.        STOP taking these medications    KLOR-CON ORAL     nitrofurantoin 100 MG capsule  Commonly known as: MACRODANTIN     phenazopyridine 100 MG tablet  Commonly known as: PYRIDIUM     promethazine 25 MG tablet  Commonly known as: PHENERGAN          Maryana Morgan MD  Department of Hospital Medicine  [facility]

## 2020-06-19 LAB — BACTERIA UR CULT: NO GROWTH

## 2020-06-22 DIAGNOSIS — K52.9 CHRONIC DIARRHEA: Primary | Chronic | ICD-10-CM

## 2020-06-22 RX ORDER — DIPHENOXYLATE HYDROCHLORIDE AND ATROPINE SULFATE 2.5; .025 MG/1; MG/1
1 TABLET ORAL 4 TIMES DAILY PRN
Qty: 20 TABLET | Refills: 0 | Status: SHIPPED | OUTPATIENT
Start: 2020-06-22 | End: 2020-07-02

## 2020-06-22 NOTE — TELEPHONE ENCOUNTER
Pt was DC'd from [facility] on 6/18/20.  She said she was doing much better but while she was in the hospital they gave her something to help her with having a BM.  Now that she is out, she has continued to have diarrhea and would like the medicine Dr. Horton gave her in the past, she thinks it was Lomotil and imodium.  I advised her that I would notify Dr. Horton' clinical staff and someone would get back with her.  She will call to schedule when she is ready.

## 2020-06-23 NOTE — PHYSICIAN QUERY
"PT Name: Shara Hutchins  MR #: [id number]     Physician Query Form - Documentation Clarification      CDS/: Fabio James               Contact information: [phone]    This form is a permanent document in the medical record.     Query Date: June 23, 2020    By submitting this query, we are merely seeking further clarification of documentation. Please utilize your independent clinical judgment when addressing the question(s) below.    The Medical record reflects the following:    Supporting Clinical Findings Location in Medical Record     DARCI on CKD 3    Final Active Diagnoses:     Diagnosis Date Noted POA    PRINCIPAL PROBLEM:  Stage 4 chronic kidney disease [N18.4]        DC Summary 6/18 by Dr. Morgan     eGFR 23.3 to 26.3 during this admission    Cr 1.9 to 2.1 during this admission    Recent max eGFR of 35.0 on May 23, 2020 (per [facility] lab data review)     labs                                                                            Dear Dr. Morgan, the Discharge Summary includes diagnoses of both "DARCI on CKD3" and "CKD4."      Please clarify, such as:   - DARCI on CKD3   - CKD4   - other (clarify)   - unable to determine    Provider Use Only          Dx:  DARCI on CKD3 versus only CKD4                                                                                                                            [  ] Clinically Undetermined             "

## 2020-06-24 ENCOUNTER — TELEPHONE (OUTPATIENT)
Dept: FAMILY MEDICINE | Facility: CLINIC | Age: 70
End: 2020-06-24

## 2020-06-24 NOTE — TELEPHONE ENCOUNTER
DC Date 6/18/20  Reached out to patient 6/22/20 to schedule HFU.  Had to M.  Spoke with pt 6/24/20 and HFU appointment scheduled 6/29/20.  Clinical staff notified.

## 2020-06-25 ENCOUNTER — DOCUMENT SCAN (OUTPATIENT)
Dept: HOME HEALTH SERVICES | Facility: HOSPITAL | Age: 70
End: 2020-06-25

## 2020-06-25 ENCOUNTER — OFFICE VISIT (OUTPATIENT)
Dept: UROLOGY | Facility: CLINIC | Age: 70
End: 2020-06-25
Payer: MEDICARE

## 2020-06-25 VITALS
TEMPERATURE: 98 F | BODY MASS INDEX: 19.05 KG/M2 | HEART RATE: 77 BPM | HEIGHT: 67 IN | DIASTOLIC BLOOD PRESSURE: 83 MMHG | SYSTOLIC BLOOD PRESSURE: 150 MMHG | WEIGHT: 121.38 LBS

## 2020-06-25 DIAGNOSIS — R33.9 URINARY RETENTION: Primary | ICD-10-CM

## 2020-06-25 DIAGNOSIS — R31.0 GROSS HEMATURIA: ICD-10-CM

## 2020-06-25 PROCEDURE — 1159F MED LIST DOCD IN RCRD: CPT | Mod: S$GLB,,, | Performed by: UROLOGY

## 2020-06-25 PROCEDURE — 3008F BODY MASS INDEX DOCD: CPT | Mod: S$GLB,,, | Performed by: UROLOGY

## 2020-06-25 PROCEDURE — 99214 OFFICE O/P EST MOD 30 MIN: CPT | Mod: 25,S$GLB,, | Performed by: UROLOGY

## 2020-06-25 PROCEDURE — 1126F PR PAIN SEVERITY QUANTIFIED, NO PAIN PRESENT: ICD-10-PCS | Mod: S$GLB,,, | Performed by: UROLOGY

## 2020-06-25 PROCEDURE — 99214 PR OFFICE/OUTPT VISIT, EST, LEVL IV, 30-39 MIN: ICD-10-PCS | Mod: 25,S$GLB,, | Performed by: UROLOGY

## 2020-06-25 PROCEDURE — 51700 PR IRRIGATION, BLADDER: ICD-10-PCS | Mod: S$GLB,,, | Performed by: UROLOGY

## 2020-06-25 PROCEDURE — 99999 PR PBB SHADOW E&M-EST. PATIENT-LVL IV: CPT | Mod: PBBFAC,,, | Performed by: UROLOGY

## 2020-06-25 PROCEDURE — 1159F PR MEDICATION LIST DOCUMENTED IN MEDICAL RECORD: ICD-10-PCS | Mod: S$GLB,,, | Performed by: UROLOGY

## 2020-06-25 PROCEDURE — 3079F PR MOST RECENT DIASTOLIC BLOOD PRESSURE 80-89 MM HG: ICD-10-PCS | Mod: S$GLB,,, | Performed by: UROLOGY

## 2020-06-25 PROCEDURE — 1126F AMNT PAIN NOTED NONE PRSNT: CPT | Mod: S$GLB,,, | Performed by: UROLOGY

## 2020-06-25 PROCEDURE — 1101F PR PT FALLS ASSESS DOC 0-1 FALLS W/OUT INJ PAST YR: ICD-10-PCS | Mod: S$GLB,,, | Performed by: UROLOGY

## 2020-06-25 PROCEDURE — 3077F SYST BP >= 140 MM HG: CPT | Mod: S$GLB,,, | Performed by: UROLOGY

## 2020-06-25 PROCEDURE — 3008F PR BODY MASS INDEX (BMI) DOCUMENTED: ICD-10-PCS | Mod: S$GLB,,, | Performed by: UROLOGY

## 2020-06-25 PROCEDURE — 1101F PT FALLS ASSESS-DOCD LE1/YR: CPT | Mod: S$GLB,,, | Performed by: UROLOGY

## 2020-06-25 PROCEDURE — 3077F PR MOST RECENT SYSTOLIC BLOOD PRESSURE >= 140 MM HG: ICD-10-PCS | Mod: S$GLB,,, | Performed by: UROLOGY

## 2020-06-25 PROCEDURE — 3079F DIAST BP 80-89 MM HG: CPT | Mod: S$GLB,,, | Performed by: UROLOGY

## 2020-06-25 PROCEDURE — 51700 IRRIGATION OF BLADDER: CPT | Mod: S$GLB,,, | Performed by: UROLOGY

## 2020-06-25 PROCEDURE — 99999 PR PBB SHADOW E&M-EST. PATIENT-LVL IV: ICD-10-PCS | Mod: PBBFAC,,, | Performed by: UROLOGY

## 2020-06-25 NOTE — PROGRESS NOTES
[facility]rology Established Patient/H&P:    Shara Hutchins is a 70 y.o. female who presents for follow up for gross hematuria.     Patient with a history of HTN, vulvar cancer s/p chemo/XRT in 2013 and CKD who presented to the [facility] emergency department on 2/23/20 with abdominal pain, vaginal bleeding, DARCI  (3.8 from 2.5 baseline) and a 20 pound weight loss. Lee was placed on admission to monitor UOP. CTRSS 2/12 2/23 showed bilateral nonobstructing renal stones.      She underwent loop ileostomy on 2/13/20 for chronic diarrhea. Renal ultrasound on 2/26 showed no hydro, b renal stones and indeterminate artifact. She went to [facility] on 3/7 and returned and states gross hematuria started after this. Prior to admission pt denied having gross hematuria, urinary frequency, vaginal bleeding or frequent uti's. She did however state that her vulvar itching had returned and she was concerned about recurrence of cancer with no recent f/u by gyn onc. She has seen by  inpt and plan was for outpt f/u with .      She has been awaiting LTAC placement on 3/13. Consulted for persistent hematuria. The plan was for outpt cysto to eval for any bladder path not seen on ct and discussion of renal stones as well as discharging with lee. Her hematuria continued, ucx on 3/15 grew Klebsiella and she was started on rocephin and completed a 5d course. Around 3/17  she was given a voiding trial vs lee exchange? on 3/17 for larger 18fr.       She continued to have hematuria requiring intermittent irrigation and 2 transfusions. Urology was re-consulted 3/24, Dr. Garcia asked them to remove the 18fr, place a 22 [address] 3 [address]. CBI weaned off. She underwent ileostomy reversal on 5/18/20. Discharged from the hospital on 5/23/20.        Interval History     6/10/20: Patient presents today with hematuria for one day. She was prescribed Pyridium and Macrodantin for presumed UTI.       6/16/20: Patient  presented today [facility] ED on 6/14/20 with worsening gross hematuria. Urology consulted to assist with management. States she became light-headed and dizzy at home due to anemia.     6/25/20: She is s/p cystoscopy with fulguration on 6/16/20. Placed on CBI overnight for monitoring. Urine remained clear and patient discharged from [facility]. She presents today with her lee in place and clear urine.Here for voiding trial.      She denies any fever, gross hematuria, chills, nausea, emesis, bone pain, weight loss or  trauma.       Urine cytology  Negative  6/16/20      Past Medical History:   Diagnosis Date    Chronic kidney disease, stage III (moderate) 1/7/2018    Diabetes mellitus, type 2     Fatigue     Hypertension     Iron deficiency anemia due to chronic blood loss 1/7/2018    Vulvar cancer 1/7/2019       Past Surgical History:   Procedure Laterality Date    ABDOMINAL SURGERY      AV FISTULA PLACEMENT Left 2018    BACK SURGERY      BLADDER FULGURATION N/A 6/16/2020    Procedure: FULGURATION, BLADDER;  Surgeon: Damari Barber Jr., MD;  Location: [facility];  Service: Urology;  Laterality: N/A;    carpel tunnel surgery once on each hand      CERVICAL FUSION      x 4    cervix repair      CHOLECYSTECTOMY  2000    CYSTOSCOPY W/ RETROGRADES  6/16/2020    Procedure: CYSTOSCOPY, WITH RETROGRADE PYELOGRAM;  Surgeon: Damari Barber Jr., MD;  Location: [facility];  Service: Urology;;    DIAGNOSTIC LAPAROSCOPY N/A 2/13/2020    Procedure: LAPAROSCOPY, DIAGNOSTIC;  Surgeon: Robbi Lovell III, MD;  Location: [facility];  Service: General;  Laterality: N/A;    HYSTERECTOMY  1980    ILEOSTOMY N/A 2/13/2020    Procedure: CREATION, ILEOSTOMY Loop;  Surgeon: Robbi Lovell III, MD;  Location[facility];  Service: General;  Laterality: N/A;    ILEOSTOMY CLOSURE N/A 5/18/2020    Procedure: CLOSURE, ILEOSTOMY;  Surgeon: Robbi Lovell III, MD;  Location: [facility];  Service: General;  Laterality: N/A;    LUMBAR  LAMINECTOMY      LYSIS OF ADHESIONS N/A 2020    Procedure: LYSIS, ADHESIONS;  Surgeon: Robbi Lovell III, MD;  Location: [facility] OR;  Service: General;  Laterality: N/A;    NECK SURGERY      PHLEBOGRAPHY  2020    Procedure: VENOGRAM;  Surgeon: Robbi Lovell III, MD;  Location: [facility] OR;  Service: General;;    REMOVAL OF VASCULAR ACCESS PORT Right 2020    Procedure: REMOVAL, VASCULAR ACCESS PORT;  Surgeon: Robbi Lovell III, MD;  Location: [facility] OR;  Service: General;  Laterality: Right;    SHOULDER SURGERY      vulva cancer         Family History   Problem Relation Age of Onset    Heart disease Mother     Hypertension Mother     Cancer Mother         lung    Heart disease Father     Heart disease Sister         Open heart surgery    No Known Problems Daughter        Social History     Socioeconomic History    Marital status:      Spouse name: Not on file    Number of children: Not on file    Years of education: Not on file    Highest education level: Not on file   Occupational History    Not on file   Social Needs    Financial resource strain: Not on file    Food insecurity     Worry: Not on file     Inability: Not on file    Transportation needs     Medical: Not on file     Non-medical: Not on file   Tobacco Use    Smoking status: Former Smoker     Packs/day: 1.00     Years: 33.00     Pack years: 33.00     Types: Cigarettes     Quit date: 2000     Years since quittin.9    Smokeless tobacco: Former User     Quit date:    Substance and Sexual Activity    Alcohol use: No    Drug use: No    Sexual activity: Not on file   Lifestyle    Physical activity     Days per week: Not on file     Minutes per session: Not on file    Stress: Not on file   Relationships    Social connections     Talks on phone: Not on file     Gets together: Not on file     Attends Holiness service: Not on file     Active member of club or organization: Not on file      "Attends meetings of clubs or organizations: Not on file     Relationship status: Not on file   Other Topics Concern    Not on file   Social History Narrative    Not on file       Review of patient's allergies indicates:   Allergen Reactions    Ciprofloxacin Other (See Comments)     Elevated liver enzymes      Pcn [penicillins] Anaphylaxis     TOLERATES ROCEPHIN WITHOUT DIFFICULTY       Medications Reviewed: see MAR    ROS:    Constitutional: denies fevers, chills, night sweats, fatigue, malaise  Respiratory: negative for cough, shortness of breath, wheezing, dyspnea.  Cardiovascular: + for high blood pressure, negative for chest pain, varicose veins, ankle swelling, palpitations, syncope.  GI: negative for abdominal pain, heartburn, indigestion, nausea, vomiting, constipation, diarrhea, blood in stool.   Urology: as noted above in HPI  Endocrinology: negative for cold intolerance, excessive thirst, not feeling tired/sluggish, no heat intolerance.   Hematology/Lymph: negative for easy bleeding, easy bruising, swollen glands.  Musculoskeletal: negative for back pain, joint pain, joint swelling, neck pain.  Allergy-Immunology: negative for seasonal allergies, negative for unusual infections.   Skin: negative for boils, breast lumps, hives, itching, rash.   Neurology: negative for, dizziness, headache, tingling/numbness, tremors.   Psych: satisfied with life; negative for, anxiety, depression, suicidal thoughts.     PHYSICAL EXAM:    Vitals:    06/25/20 1025   BP: (!) 150/83   Pulse: 77   Temp: 97.6 °F (36.4 °C)     Body mass index is 19.01 kg/m². Weight: 55 kg (121 lb 5.8 oz) Height: 5' 7" (170.2 cm)       General: Alert, cooperative, no distress, appears stated age  Head: Normocephalic, without obvious abnormality, atraumatic  Neck: no masses, no thyromegaly, no lymphadenopathy  Eyes: PERRL, conjunctiva/corneas clear  Lungs: Respirations unlabored, normal effort, no accessory muscle use  CV: Warm and well perfused " extremities  Abdomen: Soft, non-tender, no CVA tenderness, no hepatosplenomegaly, no hernia  Extremities: Extremities normal, atraumatic, no cyanosis or edema  Skin: Normal color, texture, and turgor, no rashes or lesions  Psych: Appropriate, well oriented, normal affect, normal mood  Neuro: Non-focal      LABS:    Recent Results (from the past 336 hour(s))   CBC auto differential    Collection Time: 06/14/20 12:45 PM   Result Value Ref Range    WBC 6.65 3.90 - 12.70 K/uL    RBC 2.41 (L) 4.00 - 5.40 M/uL    Hemoglobin 7.6 (L) 12.0 - 16.0 g/dL    Hematocrit 24.2 (L) 37.0 - 48.5 %    Mean Corpuscular Volume 100 (H) 82 - 98 fL    Mean Corpuscular Hemoglobin 31.5 (H) 27.0 - 31.0 pg    Mean Corpuscular Hemoglobin Conc 31.4 (L) 32.0 - 36.0 g/dL    RDW 16.0 (H) 11.5 - 14.5 %    Platelets 281 150 - 350 K/uL    MPV 9.3 9.2 - 12.9 fL    Immature Granulocytes 0.3 0.0 - 0.5 %    Gran # (ANC) 5.5 1.8 - 7.7 K/uL    Immature Grans (Abs) 0.02 0.00 - 0.04 K/uL    Lymph # 0.8 (L) 1.0 - 4.8 K/uL    Mono # 0.3 0.3 - 1.0 K/uL    Eos # 0.1 0.0 - 0.5 K/uL    Baso # 0.02 0.00 - 0.20 K/uL    nRBC 0 0 /100 WBC    Gran% 82.8 (H) 38.0 - 73.0 %    Lymph% 11.6 (L) 18.0 - 48.0 %    Mono% 3.9 (L) 4.0 - 15.0 %    Eosinophil% 1.1 0.0 - 8.0 %    Basophil% 0.3 0.0 - 1.9 %    Differential Method Automated    Comprehensive metabolic panel    Collection Time: 06/14/20 12:45 PM   Result Value Ref Range    Sodium 137 136 - 145 mmol/L    Potassium 5.3 (H) 3.5 - 5.1 mmol/L    Chloride 111 (H) 95 - 110 mmol/L    CO2 21 (L) 23 - 29 mmol/L    Glucose 133 (H) 70 - 110 mg/dL    BUN, Bld 29 (H) 8 - 23 mg/dL    Creatinine 2.1 (H) 0.5 - 1.4 mg/dL    Calcium 8.2 (L) 8.7 - 10.5 mg/dL    Total Protein 6.4 6.0 - 8.4 g/dL    Albumin 3.5 3.5 - 5.2 g/dL    Total Bilirubin 0.6 0.1 - 1.0 mg/dL    Alkaline Phosphatase 122 55 - 135 U/L    AST 12 10 - 40 U/L    ALT 9 (L) 10 - 44 U/L    Anion Gap 5 (L) 8 - 16 mmol/L    eGFR if African American 26.9 (A) >60 mL/min/1.73 m^2     eGFR if non  23.3 (A) >60 mL/min/1.73 m^2   Protime-INR    Collection Time: 06/14/20 12:45 PM   Result Value Ref Range    PT 15.5 (H) 10.6 - 14.8 sec    INR 1.3    Type & Screen    Collection Time: 06/14/20 12:45 PM   Result Value Ref Range    Group & Rh A POS     Indirect Bruno NEG    Prepare RBC 1 Unit    Collection Time: 06/14/20 12:45 PM   Result Value Ref Range    UNIT NUMBER [id number]     Product Code P0416I96     DISPENSE STATUS TRANSFUSED     CODING SYSTEM [id number]     Unit Blood Type Code 6200     Unit Blood Type A POS     Unit Expiration [id number]    Troponin I    Collection Time: 06/14/20 12:45 PM   Result Value Ref Range    Troponin I <0.030 <=0.040 ng/mL   Urinalysis, Reflex to Urine Culture Urine, Clean Catch    Collection Time: 06/14/20  1:05 PM    Specimen: Urine   Result Value Ref Range    Specimen UA Urine, Clean Catch     Color, UA Red (A) Yellow, Straw, Na    Appearance, UA Cloudy (A) Clear    pH, UA 7.0 5.0 - 8.0    Specific Gravity, UA 1.015 1.005 - 1.030    Protein, UA SEE COMMENT Negative    Glucose, UA SEE COMMENT Negative    Ketones, UA SEE COMMENT Negative    Bilirubin (UA) SEE COMMENT Negative    Occult Blood UA SEE COMMENT Negative    Nitrite, UA SEE COMMENT Negative    Urobilinogen, UA SEE COMMENT Negative EU/dL    Leukocytes, UA SEE COMMENT Negative   Urinalysis Microscopic    Collection Time: 06/14/20  1:05 PM   Result Value Ref Range    RBC, UA >100 (H) 0 - 4 /hpf    WBC, UA 0 0 - 5 /hpf    Bacteria Negative None-Occ /hpf    Squam Epithel, UA 5 /hpf    Hyaline Casts, UA 3 (A) 0-1/lpf /lpf    Microscopic Comment SEE COMMENT    COVID-19 Rapid Screening    Collection Time: 06/14/20  1:10 PM   Result Value Ref Range    SARS-CoV-2 RNA, Amplification, Qual Negative Negative   MRSA Screen by PCR    Collection Time: 06/14/20  3:20 PM    Specimen: Nasal; Nasopharyngeal Swab   Result Value Ref Range    MRSA SCREEN BY PCR Positive (A) Negative   Urine Culture High Risk     Collection Time: 06/14/20  4:51 PM    Specimen: Urine, Catheterized   Result Value Ref Range    Urine Culture, Routine ENTEROBACTER AEROGENES  >100,000 cfu/ml   (A)        Susceptibility    Enterobacter aerogenes - CULTURE, URINE     Ceftriaxone <=1 Sensitive mcg/mL     Ciprofloxacin <=1 Sensitive mcg/mL     Cefepime <=2 Sensitive mcg/mL     Ertapenem <=0.5 Sensitive mcg/mL     Nitrofurantoin <=32 Sensitive mcg/mL     Gentamicin <=4 Sensitive mcg/mL     Levofloxacin <=2 Sensitive mcg/mL     Meropenem <=1 Sensitive mcg/mL     Piperacillin/Tazo <=16 Sensitive mcg/mL     Trimeth/Sulfa <=2/38 Sensitive mcg/mL     Tetracycline <=4 Sensitive mcg/mL     Tobramycin <=4 Sensitive mcg/mL   Hemoglobin    Collection Time: 06/14/20 10:02 PM   Result Value Ref Range    Hemoglobin 7.7 (L) 12.0 - 16.0 g/dL   Hematocrit    Collection Time: 06/14/20 10:02 PM   Result Value Ref Range    Hematocrit 23.7 (L) 37.0 - 48.5 %   CBC auto differential    Collection Time: 06/15/20  1:48 AM   Result Value Ref Range    WBC 6.53 3.90 - 12.70 K/uL    RBC 2.49 (L) 4.00 - 5.40 M/uL    Hemoglobin 7.8 (L) 12.0 - 16.0 g/dL    Hematocrit 24.3 (L) 37.0 - 48.5 %    Mean Corpuscular Volume 98 82 - 98 fL    Mean Corpuscular Hemoglobin 31.3 (H) 27.0 - 31.0 pg    Mean Corpuscular Hemoglobin Conc 32.1 32.0 - 36.0 g/dL    RDW 16.5 (H) 11.5 - 14.5 %    Platelets 225 150 - 350 K/uL    MPV 9.4 9.2 - 12.9 fL    Immature Granulocytes 0.3 0.0 - 0.5 %    Gran # (ANC) 4.1 1.8 - 7.7 K/uL    Immature Grans (Abs) 0.02 0.00 - 0.04 K/uL    Lymph # 1.7 1.0 - 4.8 K/uL    Mono # 0.5 0.3 - 1.0 K/uL    Eos # 0.2 0.0 - 0.5 K/uL    Baso # 0.02 0.00 - 0.20 K/uL    nRBC 0 0 /100 WBC    Gran% 63.1 38.0 - 73.0 %    Lymph% 25.4 18.0 - 48.0 %    Mono% 7.8 4.0 - 15.0 %    Eosinophil% 3.1 0.0 - 8.0 %    Basophil% 0.3 0.0 - 1.9 %    Differential Method Automated    Comprehensive Metabolic Panel (CMP)    Collection Time: 06/15/20  1:48 AM   Result Value Ref Range    Sodium 138 136 - 145  mmol/L    Potassium 5.7 (H) 3.5 - 5.1 mmol/L    Chloride 112 (H) 95 - 110 mmol/L    CO2 20 (L) 23 - 29 mmol/L    Glucose 71 70 - 110 mg/dL    BUN, Bld 29 (H) 8 - 23 mg/dL    Creatinine 2.1 (H) 0.5 - 1.4 mg/dL    Calcium 8.1 (L) 8.7 - 10.5 mg/dL    Total Protein 5.5 (L) 6.0 - 8.4 g/dL    Albumin 2.9 (L) 3.5 - 5.2 g/dL    Total Bilirubin 0.8 0.1 - 1.0 mg/dL    Alkaline Phosphatase 99 55 - 135 U/L    AST 10 10 - 40 U/L    ALT 6 (L) 10 - 44 U/L    Anion Gap 6 (L) 8 - 16 mmol/L    eGFR if African American 26.9 (A) >60 mL/min/1.73 m^2    eGFR if non  23.3 (A) >60 mL/min/1.73 m^2   Magnesium    Collection Time: 06/15/20  1:48 AM   Result Value Ref Range    Magnesium 1.7 1.6 - 2.6 mg/dL   Hemoglobin    Collection Time: 06/15/20  8:04 AM   Result Value Ref Range    Hemoglobin 9.2 (L) 12.0 - 16.0 g/dL   Hematocrit    Collection Time: 06/15/20  8:04 AM   Result Value Ref Range    Hematocrit 28.8 (L) 37.0 - 48.5 %   Hemoglobin    Collection Time: 06/15/20  4:09 PM   Result Value Ref Range    Hemoglobin 8.2 (L) 12.0 - 16.0 g/dL   Hematocrit    Collection Time: 06/15/20  4:09 PM   Result Value Ref Range    Hematocrit 25.1 (L) 37.0 - 48.5 %   Hemoglobin    Collection Time: 06/15/20 11:47 PM   Result Value Ref Range    Hemoglobin 8.2 (L) 12.0 - 16.0 g/dL   Hematocrit    Collection Time: 06/15/20 11:47 PM   Result Value Ref Range    Hematocrit 25.5 (L) 37.0 - 48.5 %   Hemoglobin    Collection Time: 06/16/20  8:07 AM   Result Value Ref Range    Hemoglobin 8.7 (L) 12.0 - 16.0 g/dL   Hematocrit    Collection Time: 06/16/20  8:07 AM   Result Value Ref Range    Hematocrit 27.0 (L) 37.0 - 48.5 %   Basic metabolic panel    Collection Time: 06/16/20  8:07 AM   Result Value Ref Range    Sodium 138 136 - 145 mmol/L    Potassium 5.5 (H) 3.5 - 5.1 mmol/L    Chloride 110 95 - 110 mmol/L    CO2 20 (L) 23 - 29 mmol/L    Glucose 72 70 - 110 mg/dL    BUN, Bld 30 (H) 8 - 23 mg/dL    Creatinine 2.0 (H) 0.5 - 1.4 mg/dL    Calcium 8.4  (L) 8.7 - 10.5 mg/dL    Anion Gap 8 8 - 16 mmol/L    eGFR if  28.5 (A) >60 mL/min/1.73 m^2    eGFR if non  24.8 (A) >60 mL/min/1.73 m^2   Urine Culture High Risk    Collection Time: 06/16/20 11:58 AM    Specimen: Urine, Clean Catch   Result Value Ref Range    Urine Culture, Routine No growth    Hemoglobin    Collection Time: 06/16/20  4:47 PM   Result Value Ref Range    Hemoglobin 8.8 (L) 12.0 - 16.0 g/dL   Hematocrit    Collection Time: 06/16/20  4:47 PM   Result Value Ref Range    Hematocrit 27.2 (L) 37.0 - 48.5 %   Hemoglobin    Collection Time: 06/16/20 11:42 PM   Result Value Ref Range    Hemoglobin 7.9 (L) 12.0 - 16.0 g/dL   Hematocrit    Collection Time: 06/16/20 11:42 PM   Result Value Ref Range    Hematocrit 24.6 (L) 37.0 - 48.5 %   CBC auto differential    Collection Time: 06/17/20  5:37 AM   Result Value Ref Range    WBC 8.66 3.90 - 12.70 K/uL    RBC 2.66 (L) 4.00 - 5.40 M/uL    Hemoglobin 8.6 (L) 12.0 - 16.0 g/dL    Hematocrit 26.8 (L) 37.0 - 48.5 %    Mean Corpuscular Volume 101 (H) 82 - 98 fL    Mean Corpuscular Hemoglobin 32.3 (H) 27.0 - 31.0 pg    Mean Corpuscular Hemoglobin Conc 32.1 32.0 - 36.0 g/dL    RDW 15.9 (H) 11.5 - 14.5 %    Platelets 258 150 - 350 K/uL    MPV 9.7 9.2 - 12.9 fL    Immature Granulocytes 0.3 0.0 - 0.5 %    Gran # (ANC) 6.9 1.8 - 7.7 K/uL    Immature Grans (Abs) 0.03 0.00 - 0.04 K/uL    Lymph # 1.3 1.0 - 4.8 K/uL    Mono # 0.5 0.3 - 1.0 K/uL    Eos # 0.0 0.0 - 0.5 K/uL    Baso # 0.02 0.00 - 0.20 K/uL    nRBC 0 0 /100 WBC    Gran% 79.7 (H) 38.0 - 73.0 %    Lymph% 14.4 (L) 18.0 - 48.0 %    Mono% 5.3 4.0 - 15.0 %    Eosinophil% 0.1 0.0 - 8.0 %    Basophil% 0.2 0.0 - 1.9 %    Differential Method Automated    Magnesium    Collection Time: 06/17/20  5:37 AM   Result Value Ref Range    Magnesium 1.9 1.6 - 2.6 mg/dL   Basic metabolic panel    Collection Time: 06/17/20  5:37 AM   Result Value Ref Range    Sodium 140 136 - 145 mmol/L    Potassium 5.6 (H)  3.5 - 5.1 mmol/L    Chloride 113 (H) 95 - 110 mmol/L    CO2 19 (L) 23 - 29 mmol/L    Glucose 88 70 - 110 mg/dL    BUN, Bld 30 (H) 8 - 23 mg/dL    Creatinine 1.9 (H) 0.5 - 1.4 mg/dL    Calcium 8.5 (L) 8.7 - 10.5 mg/dL    Anion Gap 8 8 - 16 mmol/L    eGFR if African American 30.4 (A) >60 mL/min/1.73 m^2    eGFR if non  26.3 (A) >60 mL/min/1.73 m^2   Basic metabolic panel    Collection Time: 06/17/20  2:49 PM   Result Value Ref Range    Sodium 138 136 - 145 mmol/L    Potassium 5.2 (H) 3.5 - 5.1 mmol/L    Chloride 109 95 - 110 mmol/L    CO2 18 (L) 23 - 29 mmol/L    Glucose 106 70 - 110 mg/dL    BUN, Bld 29 (H) 8 - 23 mg/dL    Creatinine 2.1 (H) 0.5 - 1.4 mg/dL    Calcium 8.5 (L) 8.7 - 10.5 mg/dL    Anion Gap 11 8 - 16 mmol/L    eGFR if African American 26.9 (A) >60 mL/min/1.73 m^2    eGFR if non  23.3 (A) >60 mL/min/1.73 m^2   CBC auto differential    Collection Time: 06/18/20  5:43 AM   Result Value Ref Range    WBC 6.90 3.90 - 12.70 K/uL    RBC 2.51 (L) 4.00 - 5.40 M/uL    Hemoglobin 7.9 (L) 12.0 - 16.0 g/dL    Hematocrit 25.3 (L) 37.0 - 48.5 %    Mean Corpuscular Volume 101 (H) 82 - 98 fL    Mean Corpuscular Hemoglobin 31.5 (H) 27.0 - 31.0 pg    Mean Corpuscular Hemoglobin Conc 31.2 (L) 32.0 - 36.0 g/dL    RDW 16.3 (H) 11.5 - 14.5 %    Platelets 232 150 - 350 K/uL    MPV 9.6 9.2 - 12.9 fL    Immature Granulocytes 0.3 0.0 - 0.5 %    Gran # (ANC) 4.3 1.8 - 7.7 K/uL    Immature Grans (Abs) 0.02 0.00 - 0.04 K/uL    Lymph # 1.9 1.0 - 4.8 K/uL    Mono # 0.5 0.3 - 1.0 K/uL    Eos # 0.2 0.0 - 0.5 K/uL    Baso # 0.02 0.00 - 0.20 K/uL    nRBC 0 0 /100 WBC    Gran% 62.5 38.0 - 73.0 %    Lymph% 27.7 18.0 - 48.0 %    Mono% 7.0 4.0 - 15.0 %    Eosinophil% 2.2 0.0 - 8.0 %    Basophil% 0.3 0.0 - 1.9 %    Differential Method Automated    Magnesium    Collection Time: 06/18/20  5:43 AM   Result Value Ref Range    Magnesium 1.6 1.6 - 2.6 mg/dL   Basic metabolic panel    Collection Time: 06/18/20  5:43 AM    Result Value Ref Range    Sodium 142 136 - 145 mmol/L    Potassium 4.5 3.5 - 5.1 mmol/L    Chloride 111 (H) 95 - 110 mmol/L    CO2 20 (L) 23 - 29 mmol/L    Glucose 69 (L) 70 - 110 mg/dL    BUN, Bld 27 (H) 8 - 23 mg/dL    Creatinine 2.1 (H) 0.5 - 1.4 mg/dL    Calcium 8.4 (L) 8.7 - 10.5 mg/dL    Anion Gap 11 8 - 16 mmol/L    eGFR if African American 26.9 (A) >60 mL/min/1.73 m^2    eGFR if non  23.3 (A) >60 mL/min/1.73 m^2   Prepare RBC 1 Unit    Collection Time: 06/18/20  9:00 AM   Result Value Ref Range    UNIT NUMBER [id number]     Product Code [id number]     DISPENSE STATUS TRANSFUSED     CODING SYSTEM VGFL021     Unit Blood Type Code 6200     Unit Blood Type A POS     Unit Expiration [id number]    Type & Screen    Collection Time: 06/18/20  9:24 AM   Result Value Ref Range    Group & Rh A POS     Indirect Bruno NEG        IMAGING:    US renal         3/25/20            Images independently reviewed by me     1.  Lee catheter partially decompressing the bladder with trace adjacent debris.    2.  No hydronephrosis.    3.  Small kidneys with increased parenchymal echotexture suggesting mild medical renal disease.    4.  Dense medullary pyramidal calcifications seen throughout both kidneys in keeping with medullary nephrocalcinosis (or possibly diffuse stone disease), consider correlation with the clinical history.    5.  Hepatic parenchymal echotexture consistent with hepatic steatosis.      Assessment/Diagnosis:    1. Urinary retention  US Retroperitoneal Complete (Kidney and   2. Gross hematuria         Plans:    - Patient doing well s/p cystoscopy with fulguration for gross hematuria on 6/16/20. No recurrent hematuria. Bladder filled today with 150 cc sterile water and lee removed. She voided 130 cc of clear urine without difficulty.   - RTC in 3 months with renal ultrasound and PVR on arrival.

## 2020-06-28 NOTE — PROGRESS NOTES
SUBJECTIVE:    Patient ID: Shara Hutchins is a 70 y.o. female.    Chief Complaint: Hospital Follow Up    HPI    Admit Date: 6/14/20  Admitted with hematuria and weakness-underwent FULGURATION, BLADDER (N/A)  CYSTOSCOPY, WITH RETROGRADE PYELOGRAM  REMOVAL, CALCULUS, BLADDER (N/A) -during this hospitalization renal function deteriorated and she had a fever and was found to have UTI and treated-during this hospitalization she received 2 units of RBC's  Discharge Date: 6/18/20  Discharge Facility: Hospital    Medication Reconciliation:  . see below    Reconciled Home Medications:      Medication List       START taking these medications    cefdinir 300 MG capsule  Commonly known as: OMNICEF  Take 1 capsule (300 mg total) by mouth 2 (two) times daily. for 6 days          CONTINUE taking these medications    calcitRIOL 0.5 MCG Cap  Commonly known as: ROCALTROL  Take 0.5 mcg by mouth once daily.      cholestyramine-aspartame 4 gram Pwpk  Commonly known as: QUESTRAN LIGHT  Take 1 packet (4 g total) by mouth 2 (two) times daily. for 21 days      dicyclomine 10 MG capsule  Commonly known as: BENTYL  Take 10 mg by mouth 3 (three) times daily.      diltiaZEM 120 MG Cp24  Commonly known as: CARDIZEM CD  Take 120 mg by mouth once daily.      ferrous sulfate 324 mg (65 mg iron) Tbec  Take 325 mg by mouth once daily.      levothyroxine 88 MCG tablet  Commonly known as: SYNTHROID  Take 1 tablet (88 mcg total) by mouth before breakfast.      LORazepam 0.5 MG tablet  Commonly known as: ATIVAN  Take 1 tablet (0.5 mg total) by mouth every 12 (twelve) hours as needed for Anxiety.      magnesium oxide 400 mg (241.3 mg magnesium) tablet  Commonly known as: MAG-OX  Take 800 mg by mouth once daily.      mirtazapine 15 MG tablet  Commonly known as: REMERON  Take 1 tablet (15 mg total) by mouth every evening.      oxybutynin 10 MG 24 hr tablet  Commonly known as: DITROPAN-XL  Take 1 tablet (10 mg total) by mouth once daily.      sodium  bicarbonate 325 MG tablet  Take 650 mg by mouth 2 (two) times daily.      traMADoL 50 mg tablet  Commonly known as: ULTRAM  Take 1 tablet (50 mg total) by mouth every 4 (four) hours as needed for Pain.          STOP taking these medications    KLOR-CON ORAL      nitrofurantoin 100 MG capsule  Commonly known as: MACRODANTIN      phenazopyridine 100 MG tablet  Commonly known as: PYRIDIUM      promethazine 25 MG tablet  Commonly known as: PHENERGAN                         New Prescriptions filled after discharge: yes  Discharge summary reviewed:  yes  Pending test results at discharge reviewed:   no  Follow up appointments scheduled:  no  Home Health ordered on discharge:   Yes-[facility]  Home Health company name: [facility]  DME ordered at discharge:   no  How patient is feeling since discharge from the hospital?  Doing well post discharge        No results displayed because visit has over 200 results.      Lab Visit on 06/03/2020   Component Date Value Ref Range Status    WBC 06/03/2020 8.13  3.90 - 12.70 K/uL Final    RBC 06/03/2020 2.86* 4.00 - 5.40 M/uL Final    Hemoglobin 06/03/2020 9.1* 12.0 - 16.0 g/dL Final    Hematocrit 06/03/2020 29.3* 37.0 - 48.5 % Final    Mean Corpuscular Volume 06/03/2020 102* 82 - 98 fL Final    Mean Corpuscular Hemoglobin 06/03/2020 31.8* 27.0 - 31.0 pg Final    Mean Corpuscular Hemoglobin Conc 06/03/2020 31.1* 32.0 - 36.0 g/dL Final    RDW 06/03/2020 17.9* 11.5 - 14.5 % Final    Platelets 06/03/2020 233  150 - 350 K/uL Final    MPV 06/03/2020 10.3  9.2 - 12.9 fL Final    Sodium 06/03/2020 141  136 - 145 mmol/L Final    Potassium 06/03/2020 4.3  3.5 - 5.1 mmol/L Final    Chloride 06/03/2020 117* 95 - 110 mmol/L Final    CO2 06/03/2020 14* 23 - 29 mmol/L Final    Glucose 06/03/2020 90  70 - 110 mg/dL Final    BUN, Bld 06/03/2020 22  8 - 23 mg/dL Final    Creatinine 06/03/2020 2.2* 0.5 - 1.4 mg/dL Final    Calcium 06/03/2020 8.4* 8.7 - 10.5 mg/dL Final    Total Protein 06/03/2020  5.7* 6.0 - 8.4 g/dL Final    Albumin 06/03/2020 3.0* 3.5 - 5.2 g/dL Final    Total Bilirubin 06/03/2020 0.4  0.1 - 1.0 mg/dL Final    Alkaline Phosphatase 06/03/2020 118  55 - 135 U/L Final    AST 06/03/2020 12  10 - 40 U/L Final    ALT 06/03/2020 9* 10 - 44 U/L Final    Anion Gap 06/03/2020 10  8 - 16 mmol/L Final    eGFR if African American 06/03/2020 25.4* >60 mL/min/1.73 m^2 Final    eGFR if non African American 06/03/2020 22.1* >60 mL/min/1.73 m^2 Final    Urine Culture, Routine 06/03/2020 *  Final                    Value:ESCHERICHIA COLI  >100,000 cfu/ml     No results displayed because visit has over 200 results.      Hospital Outpatient Visit on 05/15/2020   Component Date Value Ref Range Status    SARS-CoV2 (COVID-19) Qualitative P* 05/15/2020 Not Detected  Not Detected Final   Lab Visit on 05/15/2020   Component Date Value Ref Range Status    Sodium 05/15/2020 139  136 - 145 mmol/L Final    Potassium 05/15/2020 5.0  3.5 - 5.1 mmol/L Final    Chloride 05/15/2020 110  95 - 110 mmol/L Final    CO2 05/15/2020 27  23 - 29 mmol/L Final    Glucose 05/15/2020 91  70 - 110 mg/dL Final    BUN, Bld 05/15/2020 21  8 - 23 mg/dL Final    Creatinine 05/15/2020 2.1* 0.5 - 1.4 mg/dL Final    Calcium 05/15/2020 8.7  8.7 - 10.5 mg/dL Final    Total Protein 05/15/2020 5.6* 6.0 - 8.4 g/dL Final    Albumin 05/15/2020 3.0* 3.5 - 5.2 g/dL Final    Total Bilirubin 05/15/2020 0.7  0.1 - 1.0 mg/dL Final    Alkaline Phosphatase 05/15/2020 152* 55 - 135 U/L Final    AST 05/15/2020 27  10 - 40 U/L Final    ALT 05/15/2020 12  10 - 44 U/L Final    Anion Gap 05/15/2020 2* 8 - 16 mmol/L Final    eGFR if African American 05/15/2020 26.9* >60 mL/min/1.73 m^2 Final    eGFR if non African American 05/15/2020 23.3* >60 mL/min/1.73 m^2 Final    WBC 05/15/2020 4.85  3.90 - 12.70 K/uL Final    RBC 05/15/2020 2.66* 4.00 - 5.40 M/uL Final    Hemoglobin 05/15/2020 8.4* 12.0 - 16.0 g/dL Final    Hematocrit 05/15/2020  27.1* 37.0 - 48.5 % Final    Mean Corpuscular Volume 05/15/2020 102* 82 - 98 fL Final    Mean Corpuscular Hemoglobin 05/15/2020 31.6* 27.0 - 31.0 pg Final    Mean Corpuscular Hemoglobin Conc 05/15/2020 31.0* 32.0 - 36.0 g/dL Final    RDW 05/15/2020 15.9* 11.5 - 14.5 % Final    Platelets 05/15/2020 188  150 - 350 K/uL Final    MPV 05/15/2020 10.2  9.2 - 12.9 fL Final    Immature Granulocytes 05/15/2020 0.2  0.0 - 0.5 % Final    Gran # (ANC) 05/15/2020 2.2  1.8 - 7.7 K/uL Final    Immature Grans (Abs) 05/15/2020 0.01  0.00 - 0.04 K/uL Final    Lymph # 05/15/2020 1.4  1.0 - 4.8 K/uL Final    Mono # 05/15/2020 0.3  0.3 - 1.0 K/uL Final    Eos # 05/15/2020 1.0* 0.0 - 0.5 K/uL Final    Baso # 05/15/2020 0.02  0.00 - 0.20 K/uL Final    nRBC 05/15/2020 0  0 /100 WBC Final    Gran% 05/15/2020 44.9  38.0 - 73.0 % Final    Lymph% 05/15/2020 28.5  18.0 - 48.0 % Final    Mono% 05/15/2020 6.2  4.0 - 15.0 % Final    Eosinophil% 05/15/2020 19.8* 0.0 - 8.0 % Final    Basophil% 05/15/2020 0.4  0.0 - 1.9 % Final    Differential Method 05/15/2020 Automated   Final    Group & Rh 05/15/2020 A POS   Final    Indirect Bruno 05/15/2020 NEG   Final   No results displayed because visit has over 200 results.      Admission on 03/06/2020, Discharged on 03/07/2020   Component Date Value Ref Range Status    WBC 03/06/2020 3.61* 3.90 - 12.70 K/uL Final    RBC 03/06/2020 2.40* 4.00 - 5.40 M/uL Final    Hemoglobin 03/06/2020 7.4* 12.0 - 16.0 g/dL Final    Hematocrit 03/06/2020 24.7* 37.0 - 48.5 % Final    Mean Corpuscular Volume 03/06/2020 103* 82 - 98 fL Final    Mean Corpuscular Hemoglobin 03/06/2020 30.8  27.0 - 31.0 pg Final    Mean Corpuscular Hemoglobin Conc 03/06/2020 30.0* 32.0 - 36.0 g/dL Final    RDW 03/06/2020 16.0* 11.5 - 14.5 % Final    Platelets 03/06/2020 85* 150 - 350 K/uL Final    MPV 03/06/2020 12.8  9.2 - 12.9 fL Final    Immature Granulocytes 03/06/2020 0.6* 0.0 - 0.5 % Final    Gran # (ANC)  03/06/2020 2.1  1.8 - 7.7 K/uL Final    Immature Grans (Abs) 03/06/2020 0.02  0.00 - 0.04 K/uL Final    Lymph # 03/06/2020 0.9* 1.0 - 4.8 K/uL Final    Mono # 03/06/2020 0.4  0.3 - 1.0 K/uL Final    Eos # 03/06/2020 0.2  0.0 - 0.5 K/uL Final    Baso # 03/06/2020 0.02  0.00 - 0.20 K/uL Final    nRBC 03/06/2020 0  0 /100 WBC Final    Gran% 03/06/2020 57.3  38.0 - 73.0 % Final    Lymph% 03/06/2020 23.5  18.0 - 48.0 % Final    Mono% 03/06/2020 12.2  4.0 - 15.0 % Final    Eosinophil% 03/06/2020 5.8  0.0 - 8.0 % Final    Basophil% 03/06/2020 0.6  0.0 - 1.9 % Final    Differential Method 03/06/2020 Automated   Final    Magnesium 03/06/2020 2.3  1.6 - 2.6 mg/dL Final    Sodium 03/06/2020 143  136 - 145 mmol/L Final    Potassium 03/06/2020 3.5  3.5 - 5.1 mmol/L Final    Chloride 03/06/2020 107  95 - 110 mmol/L Final    CO2 03/06/2020 30* 23 - 29 mmol/L Final    Glucose 03/06/2020 125* 70 - 110 mg/dL Final    BUN, Bld 03/06/2020 28* 8 - 23 mg/dL Final    Creatinine 03/06/2020 2.1* 0.5 - 1.4 mg/dL Final    Calcium 03/06/2020 7.7* 8.7 - 10.5 mg/dL Final    Total Protein 03/06/2020 4.2* 6.0 - 8.4 g/dL Final    Albumin 03/06/2020 2.1* 3.5 - 5.2 g/dL Final    Total Bilirubin 03/06/2020 0.7  0.1 - 1.0 mg/dL Final    Alkaline Phosphatase 03/06/2020 223* 55 - 135 U/L Final    AST 03/06/2020 21  10 - 40 U/L Final    ALT 03/06/2020 14  10 - 44 U/L Final    Anion Gap 03/06/2020 6* 8 - 16 mmol/L Final    eGFR if African American 03/06/2020 27* >60 mL/min/1.73 m^2 Final    eGFR if non African American 03/06/2020 23* >60 mL/min/1.73 m^2 Final    Phosphorus 03/06/2020 2.4* 2.7 - 4.5 mg/dL Final    WBC 03/07/2020 4.25  3.90 - 12.70 K/uL Final    RBC 03/07/2020 2.49* 4.00 - 5.40 M/uL Final    Hemoglobin 03/07/2020 7.8* 12.0 - 16.0 g/dL Final    Hematocrit 03/07/2020 25.4* 37.0 - 48.5 % Final    Mean Corpuscular Volume 03/07/2020 102* 82 - 98 fL Final    Mean Corpuscular Hemoglobin 03/07/2020 31.3* 27.0 -  31.0 pg Final    Mean Corpuscular Hemoglobin Conc 03/07/2020 30.7* 32.0 - 36.0 g/dL Final    RDW 03/07/2020 16.1* 11.5 - 14.5 % Final    Platelets 03/07/2020 100* 150 - 350 K/uL Final    MPV 03/07/2020 12.7  9.2 - 12.9 fL Final    Immature Granulocytes 03/07/2020 0.5  0.0 - 0.5 % Final    Gran # (ANC) 03/07/2020 2.1  1.8 - 7.7 K/uL Final    Immature Grans (Abs) 03/07/2020 0.02  0.00 - 0.04 K/uL Final    Lymph # 03/07/2020 1.4  1.0 - 4.8 K/uL Final    Mono # 03/07/2020 0.4  0.3 - 1.0 K/uL Final    Eos # 03/07/2020 0.3  0.0 - 0.5 K/uL Final    Baso # 03/07/2020 0.02  0.00 - 0.20 K/uL Final    nRBC 03/07/2020 0  0 /100 WBC Final    Gran% 03/07/2020 49.4  38.0 - 73.0 % Final    Lymph% 03/07/2020 32.9  18.0 - 48.0 % Final    Mono% 03/07/2020 10.1  4.0 - 15.0 % Final    Eosinophil% 03/07/2020 6.6  0.0 - 8.0 % Final    Basophil% 03/07/2020 0.5  0.0 - 1.9 % Final    Differential Method 03/07/2020 Automated   Final    Magnesium 03/07/2020 2.1  1.6 - 2.6 mg/dL Final    Sodium 03/07/2020 144  136 - 145 mmol/L Final    Potassium 03/07/2020 4.0  3.5 - 5.1 mmol/L Final    Chloride 03/07/2020 109  95 - 110 mmol/L Final    CO2 03/07/2020 27  23 - 29 mmol/L Final    Glucose 03/07/2020 68* 70 - 110 mg/dL Final    BUN, Bld 03/07/2020 27* 8 - 23 mg/dL Final    Creatinine 03/07/2020 1.8* 0.5 - 1.4 mg/dL Final    Calcium 03/07/2020 7.9* 8.7 - 10.5 mg/dL Final    Total Protein 03/07/2020 4.3* 6.0 - 8.4 g/dL Final    Albumin 03/07/2020 2.2* 3.5 - 5.2 g/dL Final    Total Bilirubin 03/07/2020 0.8  0.1 - 1.0 mg/dL Final    Alkaline Phosphatase 03/07/2020 220* 55 - 135 U/L Final    AST 03/07/2020 20  10 - 40 U/L Final    ALT 03/07/2020 16  10 - 44 U/L Final    Anion Gap 03/07/2020 8  8 - 16 mmol/L Final    eGFR if  03/07/2020 33* >60 mL/min/1.73 m^2 Final    eGFR if non African American 03/07/2020 28* >60 mL/min/1.73 m^2 Final    Phosphorus 03/07/2020 2.6* 2.7 - 4.5 mg/dL Final   No results  displayed because visit has over 200 results.      No results displayed because visit has over 200 results.      Lab Visit on 02/03/2020   Component Date Value Ref Range Status    WBC 02/03/2020 8.83  3.90 - 12.70 K/uL Final    RBC 02/03/2020 3.12* 4.00 - 5.40 M/uL Final    Hemoglobin 02/03/2020 10.0* 12.0 - 16.0 g/dL Final    Hematocrit 02/03/2020 31.0* 37.0 - 48.5 % Final    Mean Corpuscular Volume 02/03/2020 99* 82 - 98 fL Final    Mean Corpuscular Hemoglobin 02/03/2020 32.1* 27.0 - 31.0 pg Final    Mean Corpuscular Hemoglobin Conc 02/03/2020 32.3  32.0 - 36.0 g/dL Final    RDW 02/03/2020 16.1* 11.5 - 14.5 % Final    Platelets 02/03/2020 176  150 - 350 K/uL Final    MPV 02/03/2020 11.0  9.2 - 12.9 fL Final    Immature Granulocytes 02/03/2020 0.3  0.0 - 0.5 % Final    Gran # (ANC) 02/03/2020 6.5  1.8 - 7.7 K/uL Final    Immature Grans (Abs) 02/03/2020 0.03  0.00 - 0.04 K/uL Final    Lymph # 02/03/2020 1.5  1.0 - 4.8 K/uL Final    Mono # 02/03/2020 0.7  0.3 - 1.0 K/uL Final    Eos # 02/03/2020 0.2  0.0 - 0.5 K/uL Final    Baso # 02/03/2020 0.03  0.00 - 0.20 K/uL Final    nRBC 02/03/2020 0  0 /100 WBC Final    Gran% 02/03/2020 73.6* 38.0 - 73.0 % Final    Lymph% 02/03/2020 16.4* 18.0 - 48.0 % Final    Mono% 02/03/2020 7.4  4.0 - 15.0 % Final    Eosinophil% 02/03/2020 2.0  0.0 - 8.0 % Final    Basophil% 02/03/2020 0.3  0.0 - 1.9 % Final    Differential Method 02/03/2020 Automated   Final   There may be more visits with results that are not included.       Past Medical History:   Diagnosis Date    Chronic kidney disease, stage III (moderate) 1/7/2018    Diabetes mellitus, type 2     Fatigue     Hypertension     Iron deficiency anemia due to chronic blood loss 1/7/2018    Vulvar cancer 1/7/2019     Past Surgical History:   Procedure Laterality Date    ABDOMINAL SURGERY      AV FISTULA PLACEMENT Left 2018    BACK SURGERY      BLADDER FULGURATION N/A 6/16/2020    Procedure: FULGURATION,  BLADDER;  Surgeon: Damari Barber Jr., MD;  Location[facility];  Service: Urology;  Laterality: N/A;    carpel tunnel surgery once on each hand      CERVICAL FUSION      x 4    cervix repair      CHOLECYSTECTOMY  2000    CYSTOSCOPY W/ RETROGRADES  6/16/2020    Procedure: CYSTOSCOPY, WITH RETROGRADE PYELOGRAM;  Surgeon: Damari Barber Jr., MD;  Location: [facility];  Service: Urology;;    DIAGNOSTIC LAPAROSCOPY N/A 2/13/2020    Procedure: LAPAROSCOPY, DIAGNOSTIC;  Surgeon: Robbi Lovell III, MD;  Location: [facility];  Service: General;  Laterality: N/A;    HYSTERECTOMY  1980    ILEOSTOMY N/A 2/13/2020    Procedure: CREATION, ILEOSTOMY Loop;  Surgeon: Robbi Lovell III, MD;  Location: [facility];  Service: General;  Laterality: N/A;    ILEOSTOMY CLOSURE N/A 5/18/2020    Procedure: CLOSURE, ILEOSTOMY;  Surgeon: Robbi Lovell III, MD;  Location: [facility];  Service: General;  Laterality: N/A;    LUMBAR LAMINECTOMY      LYSIS OF ADHESIONS N/A 2/13/2020    Procedure: LYSIS, ADHESIONS;  Surgeon: Robbi Lovell III, MD;  Location: [facility];  Service: General;  Laterality: N/A;    NECK SURGERY      PHLEBOGRAPHY  2/28/2020    Procedure: VENOGRAM;  Surgeon: Robbi Lovell III, MD;  Location: [facility];  Service: General;;    REMOVAL OF VASCULAR ACCESS PORT Right 2/13/2020    Procedure: REMOVAL, VASCULAR ACCESS PORT;  Surgeon: Robbi Lovell III, MD;  Location: [facility];  Service: General;  Laterality: Right;    SHOULDER SURGERY      vulva cancer       Family History   Problem Relation Age of Onset    Heart disease Mother     Hypertension Mother     Cancer Mother         lung    Heart disease Father     Heart disease Sister         Open heart surgery    No Known Problems Daughter        Marital Status:   Alcohol History:  reports no history of alcohol use.  Tobacco History:  reports that she quit smoking about 19 years ago. Her smoking use included cigarettes. She has a 33.00 pack-year  smoking history. She quit smokeless tobacco use about 20 years ago.  Drug History:  reports no history of drug use.    Review of patient's allergies indicates:   Allergen Reactions    Ciprofloxacin Other (See Comments)     Elevated liver enzymes      Pcn [penicillins] Anaphylaxis     TOLERATES ROCEPHIN WITHOUT DIFFICULTY       Current Outpatient Medications:     calcitRIOL (ROCALTROL) 0.5 MCG Cap, Take 0.5 mcg by mouth once daily., Disp: , Rfl:     cholestyramine-aspartame (QUESTRAN LIGHT) 4 gram PwPk, Take 1 packet (4 g total) by mouth 2 (two) times daily. for 21 days, Disp: 42 packet, Rfl: 0    diltiaZEM (CARDIZEM CD) 120 MG Cp24, Take 120 mg by mouth once daily., Disp: , Rfl:     diphenoxylate-atropine 2.5-0.025 mg (LOMOTIL) 2.5-0.025 mg per tablet, Take 1 tablet by mouth 4 (four) times daily as needed for Diarrhea., Disp: 20 tablet, Rfl: 0    ferrous sulfate 324 mg (65 mg iron) TbEC, Take 325 mg by mouth once daily., Disp: , Rfl:     levothyroxine (SYNTHROID) 88 MCG tablet, Take 1 tablet (88 mcg total) by mouth before breakfast., Disp: 30 tablet, Rfl: 11    magnesium oxide (MAG-OX) 400 mg (241.3 mg magnesium) tablet, Take 800 mg by mouth once daily., Disp: , Rfl:     mirtazapine (REMERON) 15 MG tablet, Take 1 tablet (15 mg total) by mouth every evening., Disp: 30 tablet, Rfl: 11    sodium bicarbonate 325 MG tablet, Take 650 mg by mouth 2 (two) times daily. , Disp: , Rfl:     traMADoL (ULTRAM) 50 mg tablet, Take 1 tablet (50 mg total) by mouth every 4 (four) hours as needed for Pain., Disp: 42 tablet, Rfl: 0    Review of Systems   Constitutional: Negative for appetite change, chills, diaphoresis, fatigue, fever and unexpected weight change.   HENT: Negative for congestion, ear pain, hearing loss, nosebleeds, postnasal drip, sinus pressure, sinus pain, sneezing, sore throat, tinnitus, trouble swallowing and voice change.         Loose tooth almost to coming out   Eyes: Negative for photophobia, pain,  "itching and visual disturbance.   Respiratory: Negative for apnea, cough, chest tightness, shortness of breath, wheezing and stridor.    Cardiovascular: Negative for chest pain, palpitations and leg swelling.   Gastrointestinal: Negative for abdominal distention, abdominal pain, blood in stool, constipation, diarrhea (BM thickening up), nausea and vomiting.   Endocrine: Negative for cold intolerance, heat intolerance, polydipsia and polyuria.   Genitourinary: Negative for difficulty urinating, dyspareunia, dysuria, flank pain, frequency, hematuria, menstrual problem, pelvic pain, urgency, vaginal discharge and vaginal pain.        HPI   Musculoskeletal: Negative for arthralgias, back pain, joint swelling, myalgias, neck pain and neck stiffness.   Skin: Negative for pallor.   Allergic/Immunologic: Negative for environmental allergies and food allergies.   Neurological: Positive for light-headedness (first getting up-she compensates). Negative for dizziness, tremors, speech difficulty, weakness and numbness.   Hematological: Bruises/bleeds easily.   Psychiatric/Behavioral: Negative for agitation, confusion, decreased concentration, sleep disturbance and suicidal ideas. The patient is not nervous/anxious.         Objective:      Vitals:    06/29/20 1250   BP: (!) 102/58   Pulse: 80   Resp: 16   Temp: 99 °F (37.2 °C)   SpO2: 98%   Weight: 56.2 kg (124 lb)   Height: 5' 7" (1.702 m)     Physical Exam  Vitals signs and nursing note reviewed.   Constitutional:       General: She is not in acute distress.     Appearance: She is well-developed and normal weight. She is not ill-appearing or diaphoretic.      Comments: Thin female in NAD   HENT:      Head: Normocephalic and atraumatic.      Right Ear: Tympanic membrane, ear canal and external ear normal. There is no impacted cerumen.      Left Ear: Tympanic membrane, ear canal and external ear normal. There is no impacted cerumen.      Nose: Nose normal.      Mouth/Throat:      " Mouth: Mucous membranes are moist.      Pharynx: Oropharynx is clear. Uvula midline.   Eyes:      General: No scleral icterus.        Right eye: No discharge.         Left eye: No discharge.      Extraocular Movements: Extraocular movements intact.      Conjunctiva/sclera: Conjunctivae normal.      Pupils: Pupils are equal, round, and reactive to light.      Right eye: Pupil is round and reactive.      Left eye: Pupil is round and reactive.   Neck:      Musculoskeletal: Normal range of motion and neck supple.      Thyroid: No thyromegaly.      Vascular: No carotid bruit or JVD.      Trachea: Trachea normal.   Cardiovascular:      Rate and Rhythm: Normal rate and regular rhythm.      Pulses: Normal pulses.      Heart sounds: Normal heart sounds. No murmur. No friction rub. No gallop.       Comments: Loud S4  Pulmonary:      Effort: Pulmonary effort is normal. No respiratory distress.      Breath sounds: Normal breath sounds. No wheezing or rales.   Abdominal:      General: There is no distension.      Palpations: Abdomen is soft. There is no mass.      Tenderness: There is no abdominal tenderness. There is no right CVA tenderness, left CVA tenderness, guarding or rebound.      Hernia: No hernia is present.      Comments: Hyper BS   Musculoskeletal: Normal range of motion.         General: No swelling, tenderness, deformity or signs of injury.      Right lower leg: No edema.      Left lower leg: No edema.   Skin:     General: Skin is warm and dry.      Capillary Refill: Capillary refill takes less than 2 seconds.      Coloration: Skin is not pale.      Findings: No bruising, erythema, lesion or rash.      Nails: There is no clubbing.     Neurological:      General: No focal deficit present.      Mental Status: She is alert and oriented to person, place, and time.      Cranial Nerves: No cranial nerve deficit.      Sensory: No sensory deficit.      Motor: No weakness.      Coordination: Coordination normal.      Gait:  Gait normal.      Deep Tendon Reflexes: Reflexes normal.   Psychiatric:         Mood and Affect: Mood normal.         Speech: Speech normal.         Behavior: Behavior normal. Behavior is cooperative.         Thought Content: Thought content normal.         Judgment: Judgment normal.         Assessment:       1. Iron deficiency anemia due to chronic blood loss    2. Chronic renal disease, stage III    3. Encounter for vision screening    4. Mouth pain         Plan:       Iron deficiency anemia due to chronic blood loss  -     Comprehensive metabolic panel; Future; Expected date: 06/29/2020  -     CBC auto differential; Future; Expected date: 06/29/2020    Chronic renal disease, stage III  -     Comprehensive metabolic panel; Future; Expected date: 06/29/2020    Encounter for vision screening  -     Ambulatory referral/consult to Optometry; Future; Expected date: 07/06/2020    Mouth pain  -     traMADoL (ULTRAM) 50 mg tablet; Take 1 tablet (50 mg total) by mouth every 4 (four) hours as needed for Pain.  Dispense: 42 tablet; Refill: 0      Follow up in about 8 weeks (around 8/24/2020) for FOLLOW UP LABS, FOLLOW-UP STATUS, FOLLOW UP MEDICATIONS.

## 2020-06-28 NOTE — PROGRESS NOTES
SUBJECTIVE:    Patient ID: Shara Hutchins is a 70 y.o. female.    Chief Complaint: No chief complaint on file.    HPI         No results displayed because visit has over 200 results.      Lab Visit on 06/03/2020   Component Date Value Ref Range Status    WBC 06/03/2020 8.13  3.90 - 12.70 K/uL Final    RBC 06/03/2020 2.86* 4.00 - 5.40 M/uL Final    Hemoglobin 06/03/2020 9.1* 12.0 - 16.0 g/dL Final    Hematocrit 06/03/2020 29.3* 37.0 - 48.5 % Final    Mean Corpuscular Volume 06/03/2020 102* 82 - 98 fL Final    Mean Corpuscular Hemoglobin 06/03/2020 31.8* 27.0 - 31.0 pg Final    Mean Corpuscular Hemoglobin Conc 06/03/2020 31.1* 32.0 - 36.0 g/dL Final    RDW 06/03/2020 17.9* 11.5 - 14.5 % Final    Platelets 06/03/2020 233  150 - 350 K/uL Final    MPV 06/03/2020 10.3  9.2 - 12.9 fL Final    Sodium 06/03/2020 141  136 - 145 mmol/L Final    Potassium 06/03/2020 4.3  3.5 - 5.1 mmol/L Final    Chloride 06/03/2020 117* 95 - 110 mmol/L Final    CO2 06/03/2020 14* 23 - 29 mmol/L Final    Glucose 06/03/2020 90  70 - 110 mg/dL Final    BUN, Bld 06/03/2020 22  8 - 23 mg/dL Final    Creatinine 06/03/2020 2.2* 0.5 - 1.4 mg/dL Final    Calcium 06/03/2020 8.4* 8.7 - 10.5 mg/dL Final    Total Protein 06/03/2020 5.7* 6.0 - 8.4 g/dL Final    Albumin 06/03/2020 3.0* 3.5 - 5.2 g/dL Final    Total Bilirubin 06/03/2020 0.4  0.1 - 1.0 mg/dL Final    Alkaline Phosphatase 06/03/2020 118  55 - 135 U/L Final    AST 06/03/2020 12  10 - 40 U/L Final    ALT 06/03/2020 9* 10 - 44 U/L Final    Anion Gap 06/03/2020 10  8 - 16 mmol/L Final    eGFR if African American 06/03/2020 25.4* >60 mL/min/1.73 m^2 Final    eGFR if non African American 06/03/2020 22.1* >60 mL/min/1.73 m^2 Final    Urine Culture, Routine 06/03/2020 *  Final                    Value:ESCHERICHIA COLI  >100,000 cfu/ml     No results displayed because visit has over 200 results.      Hospital Outpatient Visit on 05/15/2020   Component Date Value Ref Range  Status    SARS-CoV2 (COVID-19) Qualitative P* 05/15/2020 Not Detected  Not Detected Final   Lab Visit on 05/15/2020   Component Date Value Ref Range Status    Sodium 05/15/2020 139  136 - 145 mmol/L Final    Potassium 05/15/2020 5.0  3.5 - 5.1 mmol/L Final    Chloride 05/15/2020 110  95 - 110 mmol/L Final    CO2 05/15/2020 27  23 - 29 mmol/L Final    Glucose 05/15/2020 91  70 - 110 mg/dL Final    BUN, Bld 05/15/2020 21  8 - 23 mg/dL Final    Creatinine 05/15/2020 2.1* 0.5 - 1.4 mg/dL Final    Calcium 05/15/2020 8.7  8.7 - 10.5 mg/dL Final    Total Protein 05/15/2020 5.6* 6.0 - 8.4 g/dL Final    Albumin 05/15/2020 3.0* 3.5 - 5.2 g/dL Final    Total Bilirubin 05/15/2020 0.7  0.1 - 1.0 mg/dL Final    Alkaline Phosphatase 05/15/2020 152* 55 - 135 U/L Final    AST 05/15/2020 27  10 - 40 U/L Final    ALT 05/15/2020 12  10 - 44 U/L Final    Anion Gap 05/15/2020 2* 8 - 16 mmol/L Final    eGFR if African American 05/15/2020 26.9* >60 mL/min/1.73 m^2 Final    eGFR if non African American 05/15/2020 23.3* >60 mL/min/1.73 m^2 Final    WBC 05/15/2020 4.85  3.90 - 12.70 K/uL Final    RBC 05/15/2020 2.66* 4.00 - 5.40 M/uL Final    Hemoglobin 05/15/2020 8.4* 12.0 - 16.0 g/dL Final    Hematocrit 05/15/2020 27.1* 37.0 - 48.5 % Final    Mean Corpuscular Volume 05/15/2020 102* 82 - 98 fL Final    Mean Corpuscular Hemoglobin 05/15/2020 31.6* 27.0 - 31.0 pg Final    Mean Corpuscular Hemoglobin Conc 05/15/2020 31.0* 32.0 - 36.0 g/dL Final    RDW 05/15/2020 15.9* 11.5 - 14.5 % Final    Platelets 05/15/2020 188  150 - 350 K/uL Final    MPV 05/15/2020 10.2  9.2 - 12.9 fL Final    Immature Granulocytes 05/15/2020 0.2  0.0 - 0.5 % Final    Gran # (ANC) 05/15/2020 2.2  1.8 - 7.7 K/uL Final    Immature Grans (Abs) 05/15/2020 0.01  0.00 - 0.04 K/uL Final    Lymph # 05/15/2020 1.4  1.0 - 4.8 K/uL Final    Mono # 05/15/2020 0.3  0.3 - 1.0 K/uL Final    Eos # 05/15/2020 1.0* 0.0 - 0.5 K/uL Final    Baso #  05/15/2020 0.02  0.00 - 0.20 K/uL Final    nRBC 05/15/2020 0  0 /100 WBC Final    Gran% 05/15/2020 44.9  38.0 - 73.0 % Final    Lymph% 05/15/2020 28.5  18.0 - 48.0 % Final    Mono% 05/15/2020 6.2  4.0 - 15.0 % Final    Eosinophil% 05/15/2020 19.8* 0.0 - 8.0 % Final    Basophil% 05/15/2020 0.4  0.0 - 1.9 % Final    Differential Method 05/15/2020 Automated   Final    Group & Rh 05/15/2020 A POS   Final    Indirect Bruno 05/15/2020 NEG   Final   No results displayed because visit has over 200 results.      Admission on 03/06/2020, Discharged on 03/07/2020   Component Date Value Ref Range Status    WBC 03/06/2020 3.61* 3.90 - 12.70 K/uL Final    RBC 03/06/2020 2.40* 4.00 - 5.40 M/uL Final    Hemoglobin 03/06/2020 7.4* 12.0 - 16.0 g/dL Final    Hematocrit 03/06/2020 24.7* 37.0 - 48.5 % Final    Mean Corpuscular Volume 03/06/2020 103* 82 - 98 fL Final    Mean Corpuscular Hemoglobin 03/06/2020 30.8  27.0 - 31.0 pg Final    Mean Corpuscular Hemoglobin Conc 03/06/2020 30.0* 32.0 - 36.0 g/dL Final    RDW 03/06/2020 16.0* 11.5 - 14.5 % Final    Platelets 03/06/2020 85* 150 - 350 K/uL Final    MPV 03/06/2020 12.8  9.2 - 12.9 fL Final    Immature Granulocytes 03/06/2020 0.6* 0.0 - 0.5 % Final    Gran # (ANC) 03/06/2020 2.1  1.8 - 7.7 K/uL Final    Immature Grans (Abs) 03/06/2020 0.02  0.00 - 0.04 K/uL Final    Lymph # 03/06/2020 0.9* 1.0 - 4.8 K/uL Final    Mono # 03/06/2020 0.4  0.3 - 1.0 K/uL Final    Eos # 03/06/2020 0.2  0.0 - 0.5 K/uL Final    Baso # 03/06/2020 0.02  0.00 - 0.20 K/uL Final    nRBC 03/06/2020 0  0 /100 WBC Final    Gran% 03/06/2020 57.3  38.0 - 73.0 % Final    Lymph% 03/06/2020 23.5  18.0 - 48.0 % Final    Mono% 03/06/2020 12.2  4.0 - 15.0 % Final    Eosinophil% 03/06/2020 5.8  0.0 - 8.0 % Final    Basophil% 03/06/2020 0.6  0.0 - 1.9 % Final    Differential Method 03/06/2020 Automated   Final    Magnesium 03/06/2020 2.3  1.6 - 2.6 mg/dL Final    Sodium 03/06/2020 143  136  - 145 mmol/L Final    Potassium 03/06/2020 3.5  3.5 - 5.1 mmol/L Final    Chloride 03/06/2020 107  95 - 110 mmol/L Final    CO2 03/06/2020 30* 23 - 29 mmol/L Final    Glucose 03/06/2020 125* 70 - 110 mg/dL Final    BUN, Bld 03/06/2020 28* 8 - 23 mg/dL Final    Creatinine 03/06/2020 2.1* 0.5 - 1.4 mg/dL Final    Calcium 03/06/2020 7.7* 8.7 - 10.5 mg/dL Final    Total Protein 03/06/2020 4.2* 6.0 - 8.4 g/dL Final    Albumin 03/06/2020 2.1* 3.5 - 5.2 g/dL Final    Total Bilirubin 03/06/2020 0.7  0.1 - 1.0 mg/dL Final    Alkaline Phosphatase 03/06/2020 223* 55 - 135 U/L Final    AST 03/06/2020 21  10 - 40 U/L Final    ALT 03/06/2020 14  10 - 44 U/L Final    Anion Gap 03/06/2020 6* 8 - 16 mmol/L Final    eGFR if African American 03/06/2020 27* >60 mL/min/1.73 m^2 Final    eGFR if non African American 03/06/2020 23* >60 mL/min/1.73 m^2 Final    Phosphorus 03/06/2020 2.4* 2.7 - 4.5 mg/dL Final    WBC 03/07/2020 4.25  3.90 - 12.70 K/uL Final    RBC 03/07/2020 2.49* 4.00 - 5.40 M/uL Final    Hemoglobin 03/07/2020 7.8* 12.0 - 16.0 g/dL Final    Hematocrit 03/07/2020 25.4* 37.0 - 48.5 % Final    Mean Corpuscular Volume 03/07/2020 102* 82 - 98 fL Final    Mean Corpuscular Hemoglobin 03/07/2020 31.3* 27.0 - 31.0 pg Final    Mean Corpuscular Hemoglobin Conc 03/07/2020 30.7* 32.0 - 36.0 g/dL Final    RDW 03/07/2020 16.1* 11.5 - 14.5 % Final    Platelets 03/07/2020 100* 150 - 350 K/uL Final    MPV 03/07/2020 12.7  9.2 - 12.9 fL Final    Immature Granulocytes 03/07/2020 0.5  0.0 - 0.5 % Final    Gran # (ANC) 03/07/2020 2.1  1.8 - 7.7 K/uL Final    Immature Grans (Abs) 03/07/2020 0.02  0.00 - 0.04 K/uL Final    Lymph # 03/07/2020 1.4  1.0 - 4.8 K/uL Final    Mono # 03/07/2020 0.4  0.3 - 1.0 K/uL Final    Eos # 03/07/2020 0.3  0.0 - 0.5 K/uL Final    Baso # 03/07/2020 0.02  0.00 - 0.20 K/uL Final    nRBC 03/07/2020 0  0 /100 WBC Final    Gran% 03/07/2020 49.4  38.0 - 73.0 % Final    Lymph%  03/07/2020 32.9  18.0 - 48.0 % Final    Mono% 03/07/2020 10.1  4.0 - 15.0 % Final    Eosinophil% 03/07/2020 6.6  0.0 - 8.0 % Final    Basophil% 03/07/2020 0.5  0.0 - 1.9 % Final    Differential Method 03/07/2020 Automated   Final    Magnesium 03/07/2020 2.1  1.6 - 2.6 mg/dL Final    Sodium 03/07/2020 144  136 - 145 mmol/L Final    Potassium 03/07/2020 4.0  3.5 - 5.1 mmol/L Final    Chloride 03/07/2020 109  95 - 110 mmol/L Final    CO2 03/07/2020 27  23 - 29 mmol/L Final    Glucose 03/07/2020 68* 70 - 110 mg/dL Final    BUN, Bld 03/07/2020 27* 8 - 23 mg/dL Final    Creatinine 03/07/2020 1.8* 0.5 - 1.4 mg/dL Final    Calcium 03/07/2020 7.9* 8.7 - 10.5 mg/dL Final    Total Protein 03/07/2020 4.3* 6.0 - 8.4 g/dL Final    Albumin 03/07/2020 2.2* 3.5 - 5.2 g/dL Final    Total Bilirubin 03/07/2020 0.8  0.1 - 1.0 mg/dL Final    Alkaline Phosphatase 03/07/2020 220* 55 - 135 U/L Final    AST 03/07/2020 20  10 - 40 U/L Final    ALT 03/07/2020 16  10 - 44 U/L Final    Anion Gap 03/07/2020 8  8 - 16 mmol/L Final    eGFR if  03/07/2020 33* >60 mL/min/1.73 m^2 Final    eGFR if non African American 03/07/2020 28* >60 mL/min/1.73 m^2 Final    Phosphorus 03/07/2020 2.6* 2.7 - 4.5 mg/dL Final   No results displayed because visit has over 200 results.      No results displayed because visit has over 200 results.      Lab Visit on 02/03/2020   Component Date Value Ref Range Status    WBC 02/03/2020 8.83  3.90 - 12.70 K/uL Final    RBC 02/03/2020 3.12* 4.00 - 5.40 M/uL Final    Hemoglobin 02/03/2020 10.0* 12.0 - 16.0 g/dL Final    Hematocrit 02/03/2020 31.0* 37.0 - 48.5 % Final    Mean Corpuscular Volume 02/03/2020 99* 82 - 98 fL Final    Mean Corpuscular Hemoglobin 02/03/2020 32.1* 27.0 - 31.0 pg Final    Mean Corpuscular Hemoglobin Conc 02/03/2020 32.3  32.0 - 36.0 g/dL Final    RDW 02/03/2020 16.1* 11.5 - 14.5 % Final    Platelets 02/03/2020 176  150 - 350 K/uL Final    MPV 02/03/2020  11.0  9.2 - 12.9 fL Final    Immature Granulocytes 02/03/2020 0.3  0.0 - 0.5 % Final    Gran # (ANC) 02/03/2020 6.5  1.8 - 7.7 K/uL Final    Immature Grans (Abs) 02/03/2020 0.03  0.00 - 0.04 K/uL Final    Lymph # 02/03/2020 1.5  1.0 - 4.8 K/uL Final    Mono # 02/03/2020 0.7  0.3 - 1.0 K/uL Final    Eos # 02/03/2020 0.2  0.0 - 0.5 K/uL Final    Baso # 02/03/2020 0.03  0.00 - 0.20 K/uL Final    nRBC 02/03/2020 0  0 /100 WBC Final    Gran% 02/03/2020 73.6* 38.0 - 73.0 % Final    Lymph% 02/03/2020 16.4* 18.0 - 48.0 % Final    Mono% 02/03/2020 7.4  4.0 - 15.0 % Final    Eosinophil% 02/03/2020 2.0  0.0 - 8.0 % Final    Basophil% 02/03/2020 0.3  0.0 - 1.9 % Final    Differential Method 02/03/2020 Automated   Final   There may be more visits with results that are not included.       Past Medical History:   Diagnosis Date    Chronic kidney disease, stage III (moderate) 1/7/2018    Diabetes mellitus, type 2     Fatigue     Hypertension     Iron deficiency anemia due to chronic blood loss 1/7/2018    Vulvar cancer 1/7/2019     Past Surgical History:   Procedure Laterality Date    ABDOMINAL SURGERY      AV FISTULA PLACEMENT Left 2018    BACK SURGERY      BLADDER FULGURATION N/A 6/16/2020    Procedure: FULGURATION, BLADDER;  Surgeon: Damari Barber Jr., MD;  Location: [facility];  Service: Urology;  Laterality: N/A;    carpel tunnel surgery once on each hand      CERVICAL FUSION      x 4    cervix repair      CHOLECYSTECTOMY  2000    CYSTOSCOPY W/ RETROGRADES  6/16/2020    Procedure: CYSTOSCOPY, WITH RETROGRADE PYELOGRAM;  Surgeon: Damari Barber Jr., MD;  Location: [facility];  Service: Urology;;    DIAGNOSTIC LAPAROSCOPY N/A 2/13/2020    Procedure: LAPAROSCOPY, DIAGNOSTIC;  Surgeon: Robbi Lovell III, MD;  Location: [facility];  Service: General;  Laterality: N/A;    HYSTERECTOMY  1980    ILEOSTOMY N/A 2/13/2020    Procedure: CREATION, ILEOSTOMY Loop;  Surgeon: Robbi Lovell III, MD;  Location:  [facility];  Service: General;  Laterality: N/A;    ILEOSTOMY CLOSURE N/A 5/18/2020    Procedure: CLOSURE, ILEOSTOMY;  Surgeon: Robbi Lovell III, MD;  Location: [facility];  Service: General;  Laterality: N/A;    LUMBAR LAMINECTOMY      LYSIS OF ADHESIONS N/A 2/13/2020    Procedure: LYSIS, ADHESIONS;  Surgeon: Robbi Lovell III, MD;  Location: [facility];  Service: General;  Laterality: N/A;    NECK SURGERY      PHLEBOGRAPHY  2/28/2020    Procedure: VENOGRAM;  Surgeon: Robbi Lovell III, MD;  Location: [facility];  Service: General;;    REMOVAL OF VASCULAR ACCESS PORT Right 2/13/2020    Procedure: REMOVAL, VASCULAR ACCESS PORT;  Surgeon: Robbi Lovell III, MD;  Location: [facility];  Service: General;  Laterality: Right;    SHOULDER SURGERY      vulva cancer       Family History   Problem Relation Age of Onset    Heart disease Mother     Hypertension Mother     Cancer Mother         lung    Heart disease Father     Heart disease Sister         Open heart surgery    No Known Problems Daughter        Marital Status:   Alcohol History:  reports no history of alcohol use.  Tobacco History:  reports that she quit smoking about 19 years ago. Her smoking use included cigarettes. She has a 33.00 pack-year smoking history. She quit smokeless tobacco use about 20 years ago.  Drug History:  reports no history of drug use.    Review of patient's allergies indicates:   Allergen Reactions    Ciprofloxacin Other (See Comments)     Elevated liver enzymes      Pcn [penicillins] Anaphylaxis     TOLERATES ROCEPHIN WITHOUT DIFFICULTY       Current Outpatient Medications:     calcitRIOL (ROCALTROL) 0.5 MCG Cap, Take 0.5 mcg by mouth once daily., Disp: , Rfl:     cholestyramine-aspartame (QUESTRAN LIGHT) 4 gram PwPk, Take 1 packet (4 g total) by mouth 2 (two) times daily. for 21 days, Disp: 42 packet, Rfl: 0    dicyclomine (BENTYL) 10 MG capsule, Take 10 mg by mouth 3 (three) times daily., Disp: , Rfl:      diltiaZEM (CARDIZEM CD) 120 MG Cp24, Take 120 mg by mouth once daily., Disp: , Rfl:     diphenoxylate-atropine 2.5-0.025 mg (LOMOTIL) 2.5-0.025 mg per tablet, Take 1 tablet by mouth 4 (four) times daily as needed for Diarrhea., Disp: 20 tablet, Rfl: 0    ferrous sulfate 324 mg (65 mg iron) TbEC, Take 325 mg by mouth once daily., Disp: , Rfl:     levothyroxine (SYNTHROID) 88 MCG tablet, Take 1 tablet (88 mcg total) by mouth before breakfast., Disp: 30 tablet, Rfl: 11    LORazepam (ATIVAN) 0.5 MG tablet, Take 1 tablet (0.5 mg total) by mouth every 12 (twelve) hours as needed for Anxiety. (Patient not taking: Reported on 6/2/2020), Disp: 8 tablet, Rfl: 0    magnesium oxide (MAG-OX) 400 mg (241.3 mg magnesium) tablet, Take 800 mg by mouth once daily., Disp: , Rfl:     mirtazapine (REMERON) 15 MG tablet, Take 1 tablet (15 mg total) by mouth every evening., Disp: 30 tablet, Rfl: 11    sodium bicarbonate 325 MG tablet, Take 650 mg by mouth 2 (two) times daily. , Disp: , Rfl:     traMADoL (ULTRAM) 50 mg tablet, Take 1 tablet (50 mg total) by mouth every 4 (four) hours as needed for Pain., Disp: 42 tablet, Rfl: 0    Review of Systems       Objective:      There were no vitals filed for this visit.  Physical Exam      Assessment:       No diagnosis found.     Plan:       There are no diagnoses linked to this encounter.  No follow-ups on file.        6/28/2020 April Horton M.D.

## 2020-06-29 ENCOUNTER — OFFICE VISIT (OUTPATIENT)
Dept: FAMILY MEDICINE | Facility: CLINIC | Age: 70
End: 2020-06-29
Payer: MEDICARE

## 2020-06-29 VITALS
RESPIRATION RATE: 16 BRPM | OXYGEN SATURATION: 98 % | DIASTOLIC BLOOD PRESSURE: 58 MMHG | WEIGHT: 124 LBS | BODY MASS INDEX: 19.46 KG/M2 | TEMPERATURE: 99 F | HEART RATE: 80 BPM | SYSTOLIC BLOOD PRESSURE: 102 MMHG | HEIGHT: 67 IN

## 2020-06-29 DIAGNOSIS — Z01.00 ENCOUNTER FOR VISION SCREENING: ICD-10-CM

## 2020-06-29 DIAGNOSIS — K13.79 MOUTH PAIN: ICD-10-CM

## 2020-06-29 DIAGNOSIS — N18.30 CHRONIC RENAL DISEASE, STAGE III: ICD-10-CM

## 2020-06-29 DIAGNOSIS — D50.0 IRON DEFICIENCY ANEMIA DUE TO CHRONIC BLOOD LOSS: Primary | ICD-10-CM

## 2020-06-29 PROCEDURE — 3074F PR MOST RECENT SYSTOLIC BLOOD PRESSURE < 130 MM HG: ICD-10-PCS | Mod: S$GLB,,, | Performed by: INTERNAL MEDICINE

## 2020-06-29 PROCEDURE — 99214 PR OFFICE/OUTPT VISIT, EST, LEVL IV, 30-39 MIN: ICD-10-PCS | Mod: S$GLB,,, | Performed by: INTERNAL MEDICINE

## 2020-06-29 PROCEDURE — 1159F PR MEDICATION LIST DOCUMENTED IN MEDICAL RECORD: ICD-10-PCS | Mod: S$GLB,,, | Performed by: INTERNAL MEDICINE

## 2020-06-29 PROCEDURE — 1101F PR PT FALLS ASSESS DOC 0-1 FALLS W/OUT INJ PAST YR: ICD-10-PCS | Mod: S$GLB,,, | Performed by: INTERNAL MEDICINE

## 2020-06-29 PROCEDURE — 1101F PT FALLS ASSESS-DOCD LE1/YR: CPT | Mod: S$GLB,,, | Performed by: INTERNAL MEDICINE

## 2020-06-29 PROCEDURE — 1126F PR PAIN SEVERITY QUANTIFIED, NO PAIN PRESENT: ICD-10-PCS | Mod: S$GLB,,, | Performed by: INTERNAL MEDICINE

## 2020-06-29 PROCEDURE — 1126F AMNT PAIN NOTED NONE PRSNT: CPT | Mod: S$GLB,,, | Performed by: INTERNAL MEDICINE

## 2020-06-29 PROCEDURE — 1111F DSCHRG MED/CURRENT MED MERGE: CPT | Mod: S$GLB,,, | Performed by: INTERNAL MEDICINE

## 2020-06-29 PROCEDURE — 99214 OFFICE O/P EST MOD 30 MIN: CPT | Mod: S$GLB,,, | Performed by: INTERNAL MEDICINE

## 2020-06-29 PROCEDURE — 1111F PR DISCHARGE MEDS RECONCILED W/ CURRENT OUTPATIENT MED LIST: ICD-10-PCS | Mod: S$GLB,,, | Performed by: INTERNAL MEDICINE

## 2020-06-29 PROCEDURE — 3078F DIAST BP <80 MM HG: CPT | Mod: S$GLB,,, | Performed by: INTERNAL MEDICINE

## 2020-06-29 PROCEDURE — 3008F BODY MASS INDEX DOCD: CPT | Mod: S$GLB,,, | Performed by: INTERNAL MEDICINE

## 2020-06-29 PROCEDURE — 3078F PR MOST RECENT DIASTOLIC BLOOD PRESSURE < 80 MM HG: ICD-10-PCS | Mod: S$GLB,,, | Performed by: INTERNAL MEDICINE

## 2020-06-29 PROCEDURE — 3008F PR BODY MASS INDEX (BMI) DOCUMENTED: ICD-10-PCS | Mod: S$GLB,,, | Performed by: INTERNAL MEDICINE

## 2020-06-29 PROCEDURE — 3074F SYST BP LT 130 MM HG: CPT | Mod: S$GLB,,, | Performed by: INTERNAL MEDICINE

## 2020-06-29 PROCEDURE — 1159F MED LIST DOCD IN RCRD: CPT | Mod: S$GLB,,, | Performed by: INTERNAL MEDICINE

## 2020-06-29 RX ORDER — TRAMADOL HYDROCHLORIDE 50 MG/1
50 TABLET ORAL EVERY 4 HOURS PRN
Qty: 42 TABLET | Refills: 0 | Status: SHIPPED | OUTPATIENT
Start: 2020-06-29 | End: 2020-08-29

## 2020-06-30 ENCOUNTER — LAB VISIT (OUTPATIENT)
Dept: LAB | Facility: HOSPITAL | Age: 70
End: 2020-06-30
Attending: INTERNAL MEDICINE
Payer: MEDICARE

## 2020-06-30 DIAGNOSIS — D50.0 IRON DEFICIENCY ANEMIA SECONDARY TO BLOOD LOSS (CHRONIC): Primary | ICD-10-CM

## 2020-06-30 DIAGNOSIS — N18.30 CHRONIC KIDNEY DISEASE, STAGE III (MODERATE): ICD-10-CM

## 2020-06-30 LAB
ALBUMIN SERPL BCP-MCNC: 3.1 G/DL (ref 3.5–5.2)
ALP SERPL-CCNC: 156 U/L (ref 55–135)
ALT SERPL W/O P-5'-P-CCNC: 17 U/L (ref 10–44)
ANION GAP SERPL CALC-SCNC: 6 MMOL/L (ref 8–16)
AST SERPL-CCNC: 27 U/L (ref 10–40)
BASOPHILS # BLD AUTO: 0.01 K/UL (ref 0–0.2)
BASOPHILS NFR BLD: 0.2 % (ref 0–1.9)
BILIRUB SERPL-MCNC: 0.4 MG/DL (ref 0.1–1)
BUN SERPL-MCNC: 14 MG/DL (ref 8–23)
CALCIUM SERPL-MCNC: 8.3 MG/DL (ref 8.7–10.5)
CHLORIDE SERPL-SCNC: 118 MMOL/L (ref 95–110)
CO2 SERPL-SCNC: 18 MMOL/L (ref 23–29)
CREAT SERPL-MCNC: 2.3 MG/DL (ref 0.5–1.4)
DIFFERENTIAL METHOD: ABNORMAL
EOSINOPHIL # BLD AUTO: 0.1 K/UL (ref 0–0.5)
EOSINOPHIL NFR BLD: 2.5 % (ref 0–8)
ERYTHROCYTE [DISTWIDTH] IN BLOOD BY AUTOMATED COUNT: 16.5 % (ref 11.5–14.5)
EST. GFR  (AFRICAN AMERICAN): 24.1 ML/MIN/1.73 M^2
EST. GFR  (NON AFRICAN AMERICAN): 20.9 ML/MIN/1.73 M^2
GLUCOSE SERPL-MCNC: 68 MG/DL (ref 70–110)
HCT VFR BLD AUTO: 28.5 % (ref 37–48.5)
HGB BLD-MCNC: 9.2 G/DL (ref 12–16)
IMM GRANULOCYTES # BLD AUTO: 0.02 K/UL (ref 0–0.04)
IMM GRANULOCYTES NFR BLD AUTO: 0.4 % (ref 0–0.5)
LYMPHOCYTES # BLD AUTO: 0.8 K/UL (ref 1–4.8)
LYMPHOCYTES NFR BLD: 14.5 % (ref 18–48)
MCH RBC QN AUTO: 32.1 PG (ref 27–31)
MCHC RBC AUTO-ENTMCNC: 32.3 G/DL (ref 32–36)
MCV RBC AUTO: 99 FL (ref 82–98)
MONOCYTES # BLD AUTO: 0.3 K/UL (ref 0.3–1)
MONOCYTES NFR BLD: 6.2 % (ref 4–15)
NEUTROPHILS # BLD AUTO: 4 K/UL (ref 1.8–7.7)
NEUTROPHILS NFR BLD: 76.2 % (ref 38–73)
NRBC BLD-RTO: 0 /100 WBC
PLATELET # BLD AUTO: 185 K/UL (ref 150–350)
PMV BLD AUTO: 10.6 FL (ref 9.2–12.9)
POTASSIUM SERPL-SCNC: 3.6 MMOL/L (ref 3.5–5.1)
PROT SERPL-MCNC: 5.6 G/DL (ref 6–8.4)
RBC # BLD AUTO: 2.87 M/UL (ref 4–5.4)
SODIUM SERPL-SCNC: 142 MMOL/L (ref 136–145)
WBC # BLD AUTO: 5.3 K/UL (ref 3.9–12.7)

## 2020-06-30 PROCEDURE — 80053 COMPREHEN METABOLIC PANEL: CPT

## 2020-06-30 PROCEDURE — 85025 COMPLETE CBC W/AUTO DIFF WBC: CPT

## 2020-07-14 ENCOUNTER — TELEPHONE (OUTPATIENT)
Dept: FAMILY MEDICINE | Facility: CLINIC | Age: 70
End: 2020-07-14

## 2020-07-14 RX ORDER — MIRTAZAPINE 30 MG/1
30 TABLET, FILM COATED ORAL NIGHTLY
Qty: 30 TABLET | Refills: 2 | Status: SHIPPED | OUTPATIENT
Start: 2020-07-14 | End: 2020-12-09

## 2020-07-21 ENCOUNTER — PATIENT MESSAGE (OUTPATIENT)
Dept: FAMILY MEDICINE | Facility: CLINIC | Age: 70
End: 2020-07-21

## 2020-07-26 NOTE — PROGRESS NOTES
SUBJECTIVE:    Patient ID: Shara Hutchins is a 70 y.o. female.    Chief Complaint: Anemia and Follow-up (form)    HPI     Patient with multiple medical illnesses comes in for completion of [facility] form for VA coverage for assisted living-patient is chronically ill with multiple co-morbidities and chronic malnutrition which has rendered her unable to care for herself and she needs to be in assisted living-She has had progressive weight loss and is in need of nutritional recommendations for appetite stimulation    Lab Visit on 06/30/2020   Component Date Value Ref Range Status    Sodium 06/30/2020 142  136 - 145 mmol/L Final    Potassium 06/30/2020 3.6  3.5 - 5.1 mmol/L Final    Chloride 06/30/2020 118* 95 - 110 mmol/L Final    CO2 06/30/2020 18* 23 - 29 mmol/L Final    Glucose 06/30/2020 68* 70 - 110 mg/dL Final    BUN, Bld 06/30/2020 14  8 - 23 mg/dL Final    Creatinine 06/30/2020 2.3* 0.5 - 1.4 mg/dL Final    Calcium 06/30/2020 8.3* 8.7 - 10.5 mg/dL Final    Total Protein 06/30/2020 5.6* 6.0 - 8.4 g/dL Final    Albumin 06/30/2020 3.1* 3.5 - 5.2 g/dL Final    Total Bilirubin 06/30/2020 0.4  0.1 - 1.0 mg/dL Final    Alkaline Phosphatase 06/30/2020 156* 55 - 135 U/L Final    AST 06/30/2020 27  10 - 40 U/L Final    ALT 06/30/2020 17  10 - 44 U/L Final    Anion Gap 06/30/2020 6* 8 - 16 mmol/L Final    eGFR if African American 06/30/2020 24.1* >60 mL/min/1.73 m^2 Final    eGFR if non African American 06/30/2020 20.9* >60 mL/min/1.73 m^2 Final    WBC 06/30/2020 5.30  3.90 - 12.70 K/uL Final    RBC 06/30/2020 2.87* 4.00 - 5.40 M/uL Final    Hemoglobin 06/30/2020 9.2* 12.0 - 16.0 g/dL Final    Hematocrit 06/30/2020 28.5* 37.0 - 48.5 % Final    Mean Corpuscular Volume 06/30/2020 99* 82 - 98 fL Final    Mean Corpuscular Hemoglobin 06/30/2020 32.1* 27.0 - 31.0 pg Final    Mean Corpuscular Hemoglobin Conc 06/30/2020 32.3  32.0 - 36.0 g/dL Final    RDW 06/30/2020 16.5* 11.5 - 14.5 % Final    Platelets  06/30/2020 185  150 - 350 K/uL Final    MPV 06/30/2020 10.6  9.2 - 12.9 fL Final    Immature Granulocytes 06/30/2020 0.4  0.0 - 0.5 % Final    Gran # (ANC) 06/30/2020 4.0  1.8 - 7.7 K/uL Final    Immature Grans (Abs) 06/30/2020 0.02  0.00 - 0.04 K/uL Final    Lymph # 06/30/2020 0.8* 1.0 - 4.8 K/uL Final    Mono # 06/30/2020 0.3  0.3 - 1.0 K/uL Final    Eos # 06/30/2020 0.1  0.0 - 0.5 K/uL Final    Baso # 06/30/2020 0.01  0.00 - 0.20 K/uL Final    nRBC 06/30/2020 0  0 /100 WBC Final    Gran% 06/30/2020 76.2* 38.0 - 73.0 % Final    Lymph% 06/30/2020 14.5* 18.0 - 48.0 % Final    Mono% 06/30/2020 6.2  4.0 - 15.0 % Final    Eosinophil% 06/30/2020 2.5  0.0 - 8.0 % Final    Basophil% 06/30/2020 0.2  0.0 - 1.9 % Final    Differential Method 06/30/2020 Automated   Final   No results displayed because visit has over 200 results.      Lab Visit on 06/03/2020   Component Date Value Ref Range Status    WBC 06/03/2020 8.13  3.90 - 12.70 K/uL Final    RBC 06/03/2020 2.86* 4.00 - 5.40 M/uL Final    Hemoglobin 06/03/2020 9.1* 12.0 - 16.0 g/dL Final    Hematocrit 06/03/2020 29.3* 37.0 - 48.5 % Final    Mean Corpuscular Volume 06/03/2020 102* 82 - 98 fL Final    Mean Corpuscular Hemoglobin 06/03/2020 31.8* 27.0 - 31.0 pg Final    Mean Corpuscular Hemoglobin Conc 06/03/2020 31.1* 32.0 - 36.0 g/dL Final    RDW 06/03/2020 17.9* 11.5 - 14.5 % Final    Platelets 06/03/2020 233  150 - 350 K/uL Final    MPV 06/03/2020 10.3  9.2 - 12.9 fL Final    Sodium 06/03/2020 141  136 - 145 mmol/L Final    Potassium 06/03/2020 4.3  3.5 - 5.1 mmol/L Final    Chloride 06/03/2020 117* 95 - 110 mmol/L Final    CO2 06/03/2020 14* 23 - 29 mmol/L Final    Glucose 06/03/2020 90  70 - 110 mg/dL Final    BUN, Bld 06/03/2020 22  8 - 23 mg/dL Final    Creatinine 06/03/2020 2.2* 0.5 - 1.4 mg/dL Final    Calcium 06/03/2020 8.4* 8.7 - 10.5 mg/dL Final    Total Protein 06/03/2020 5.7* 6.0 - 8.4 g/dL Final    Albumin 06/03/2020 3.0*  3.5 - 5.2 g/dL Final    Total Bilirubin 06/03/2020 0.4  0.1 - 1.0 mg/dL Final    Alkaline Phosphatase 06/03/2020 118  55 - 135 U/L Final    AST 06/03/2020 12  10 - 40 U/L Final    ALT 06/03/2020 9* 10 - 44 U/L Final    Anion Gap 06/03/2020 10  8 - 16 mmol/L Final    eGFR if African American 06/03/2020 25.4* >60 mL/min/1.73 m^2 Final    eGFR if non African American 06/03/2020 22.1* >60 mL/min/1.73 m^2 Final    Urine Culture, Routine 06/03/2020 *  Final                    Value:ESCHERICHIA COLI  >100,000 cfu/ml     No results displayed because visit has over 200 results.      Hospital Outpatient Visit on 05/15/2020   Component Date Value Ref Range Status    SARS-CoV2 (COVID-19) Qualitative P* 05/15/2020 Not Detected  Not Detected Final   Lab Visit on 05/15/2020   Component Date Value Ref Range Status    Sodium 05/15/2020 139  136 - 145 mmol/L Final    Potassium 05/15/2020 5.0  3.5 - 5.1 mmol/L Final    Chloride 05/15/2020 110  95 - 110 mmol/L Final    CO2 05/15/2020 27  23 - 29 mmol/L Final    Glucose 05/15/2020 91  70 - 110 mg/dL Final    BUN, Bld 05/15/2020 21  8 - 23 mg/dL Final    Creatinine 05/15/2020 2.1* 0.5 - 1.4 mg/dL Final    Calcium 05/15/2020 8.7  8.7 - 10.5 mg/dL Final    Total Protein 05/15/2020 5.6* 6.0 - 8.4 g/dL Final    Albumin 05/15/2020 3.0* 3.5 - 5.2 g/dL Final    Total Bilirubin 05/15/2020 0.7  0.1 - 1.0 mg/dL Final    Alkaline Phosphatase 05/15/2020 152* 55 - 135 U/L Final    AST 05/15/2020 27  10 - 40 U/L Final    ALT 05/15/2020 12  10 - 44 U/L Final    Anion Gap 05/15/2020 2* 8 - 16 mmol/L Final    eGFR if African American 05/15/2020 26.9* >60 mL/min/1.73 m^2 Final    eGFR if non African American 05/15/2020 23.3* >60 mL/min/1.73 m^2 Final    WBC 05/15/2020 4.85  3.90 - 12.70 K/uL Final    RBC 05/15/2020 2.66* 4.00 - 5.40 M/uL Final    Hemoglobin 05/15/2020 8.4* 12.0 - 16.0 g/dL Final    Hematocrit 05/15/2020 27.1* 37.0 - 48.5 % Final    Mean Corpuscular Volume  05/15/2020 102* 82 - 98 fL Final    Mean Corpuscular Hemoglobin 05/15/2020 31.6* 27.0 - 31.0 pg Final    Mean Corpuscular Hemoglobin Conc 05/15/2020 31.0* 32.0 - 36.0 g/dL Final    RDW 05/15/2020 15.9* 11.5 - 14.5 % Final    Platelets 05/15/2020 188  150 - 350 K/uL Final    MPV 05/15/2020 10.2  9.2 - 12.9 fL Final    Immature Granulocytes 05/15/2020 0.2  0.0 - 0.5 % Final    Gran # (ANC) 05/15/2020 2.2  1.8 - 7.7 K/uL Final    Immature Grans (Abs) 05/15/2020 0.01  0.00 - 0.04 K/uL Final    Lymph # 05/15/2020 1.4  1.0 - 4.8 K/uL Final    Mono # 05/15/2020 0.3  0.3 - 1.0 K/uL Final    Eos # 05/15/2020 1.0* 0.0 - 0.5 K/uL Final    Baso # 05/15/2020 0.02  0.00 - 0.20 K/uL Final    nRBC 05/15/2020 0  0 /100 WBC Final    Gran% 05/15/2020 44.9  38.0 - 73.0 % Final    Lymph% 05/15/2020 28.5  18.0 - 48.0 % Final    Mono% 05/15/2020 6.2  4.0 - 15.0 % Final    Eosinophil% 05/15/2020 19.8* 0.0 - 8.0 % Final    Basophil% 05/15/2020 0.4  0.0 - 1.9 % Final    Differential Method 05/15/2020 Automated   Final    Group & Rh 05/15/2020 A POS   Final    Indirect Bruno 05/15/2020 NEG   Final   No results displayed because visit has over 200 results.      Admission on 03/06/2020, Discharged on 03/07/2020   Component Date Value Ref Range Status    WBC 03/06/2020 3.61* 3.90 - 12.70 K/uL Final    RBC 03/06/2020 2.40* 4.00 - 5.40 M/uL Final    Hemoglobin 03/06/2020 7.4* 12.0 - 16.0 g/dL Final    Hematocrit 03/06/2020 24.7* 37.0 - 48.5 % Final    Mean Corpuscular Volume 03/06/2020 103* 82 - 98 fL Final    Mean Corpuscular Hemoglobin 03/06/2020 30.8  27.0 - 31.0 pg Final    Mean Corpuscular Hemoglobin Conc 03/06/2020 30.0* 32.0 - 36.0 g/dL Final    RDW 03/06/2020 16.0* 11.5 - 14.5 % Final    Platelets 03/06/2020 85* 150 - 350 K/uL Final    MPV 03/06/2020 12.8  9.2 - 12.9 fL Final    Immature Granulocytes 03/06/2020 0.6* 0.0 - 0.5 % Final    Gran # (ANC) 03/06/2020 2.1  1.8 - 7.7 K/uL Final    Immature Grans  (Abs) 03/06/2020 0.02  0.00 - 0.04 K/uL Final    Lymph # 03/06/2020 0.9* 1.0 - 4.8 K/uL Final    Mono # 03/06/2020 0.4  0.3 - 1.0 K/uL Final    Eos # 03/06/2020 0.2  0.0 - 0.5 K/uL Final    Baso # 03/06/2020 0.02  0.00 - 0.20 K/uL Final    nRBC 03/06/2020 0  0 /100 WBC Final    Gran% 03/06/2020 57.3  38.0 - 73.0 % Final    Lymph% 03/06/2020 23.5  18.0 - 48.0 % Final    Mono% 03/06/2020 12.2  4.0 - 15.0 % Final    Eosinophil% 03/06/2020 5.8  0.0 - 8.0 % Final    Basophil% 03/06/2020 0.6  0.0 - 1.9 % Final    Differential Method 03/06/2020 Automated   Final    Magnesium 03/06/2020 2.3  1.6 - 2.6 mg/dL Final    Sodium 03/06/2020 143  136 - 145 mmol/L Final    Potassium 03/06/2020 3.5  3.5 - 5.1 mmol/L Final    Chloride 03/06/2020 107  95 - 110 mmol/L Final    CO2 03/06/2020 30* 23 - 29 mmol/L Final    Glucose 03/06/2020 125* 70 - 110 mg/dL Final    BUN, Bld 03/06/2020 28* 8 - 23 mg/dL Final    Creatinine 03/06/2020 2.1* 0.5 - 1.4 mg/dL Final    Calcium 03/06/2020 7.7* 8.7 - 10.5 mg/dL Final    Total Protein 03/06/2020 4.2* 6.0 - 8.4 g/dL Final    Albumin 03/06/2020 2.1* 3.5 - 5.2 g/dL Final    Total Bilirubin 03/06/2020 0.7  0.1 - 1.0 mg/dL Final    Alkaline Phosphatase 03/06/2020 223* 55 - 135 U/L Final    AST 03/06/2020 21  10 - 40 U/L Final    ALT 03/06/2020 14  10 - 44 U/L Final    Anion Gap 03/06/2020 6* 8 - 16 mmol/L Final    eGFR if African American 03/06/2020 27* >60 mL/min/1.73 m^2 Final    eGFR if non African American 03/06/2020 23* >60 mL/min/1.73 m^2 Final    Phosphorus 03/06/2020 2.4* 2.7 - 4.5 mg/dL Final    WBC 03/07/2020 4.25  3.90 - 12.70 K/uL Final    RBC 03/07/2020 2.49* 4.00 - 5.40 M/uL Final    Hemoglobin 03/07/2020 7.8* 12.0 - 16.0 g/dL Final    Hematocrit 03/07/2020 25.4* 37.0 - 48.5 % Final    Mean Corpuscular Volume 03/07/2020 102* 82 - 98 fL Final    Mean Corpuscular Hemoglobin 03/07/2020 31.3* 27.0 - 31.0 pg Final    Mean Corpuscular Hemoglobin Conc  03/07/2020 30.7* 32.0 - 36.0 g/dL Final    RDW 03/07/2020 16.1* 11.5 - 14.5 % Final    Platelets 03/07/2020 100* 150 - 350 K/uL Final    MPV 03/07/2020 12.7  9.2 - 12.9 fL Final    Immature Granulocytes 03/07/2020 0.5  0.0 - 0.5 % Final    Gran # (ANC) 03/07/2020 2.1  1.8 - 7.7 K/uL Final    Immature Grans (Abs) 03/07/2020 0.02  0.00 - 0.04 K/uL Final    Lymph # 03/07/2020 1.4  1.0 - 4.8 K/uL Final    Mono # 03/07/2020 0.4  0.3 - 1.0 K/uL Final    Eos # 03/07/2020 0.3  0.0 - 0.5 K/uL Final    Baso # 03/07/2020 0.02  0.00 - 0.20 K/uL Final    nRBC 03/07/2020 0  0 /100 WBC Final    Gran% 03/07/2020 49.4  38.0 - 73.0 % Final    Lymph% 03/07/2020 32.9  18.0 - 48.0 % Final    Mono% 03/07/2020 10.1  4.0 - 15.0 % Final    Eosinophil% 03/07/2020 6.6  0.0 - 8.0 % Final    Basophil% 03/07/2020 0.5  0.0 - 1.9 % Final    Differential Method 03/07/2020 Automated   Final    Magnesium 03/07/2020 2.1  1.6 - 2.6 mg/dL Final    Sodium 03/07/2020 144  136 - 145 mmol/L Final    Potassium 03/07/2020 4.0  3.5 - 5.1 mmol/L Final    Chloride 03/07/2020 109  95 - 110 mmol/L Final    CO2 03/07/2020 27  23 - 29 mmol/L Final    Glucose 03/07/2020 68* 70 - 110 mg/dL Final    BUN, Bld 03/07/2020 27* 8 - 23 mg/dL Final    Creatinine 03/07/2020 1.8* 0.5 - 1.4 mg/dL Final    Calcium 03/07/2020 7.9* 8.7 - 10.5 mg/dL Final    Total Protein 03/07/2020 4.3* 6.0 - 8.4 g/dL Final    Albumin 03/07/2020 2.2* 3.5 - 5.2 g/dL Final    Total Bilirubin 03/07/2020 0.8  0.1 - 1.0 mg/dL Final    Alkaline Phosphatase 03/07/2020 220* 55 - 135 U/L Final    AST 03/07/2020 20  10 - 40 U/L Final    ALT 03/07/2020 16  10 - 44 U/L Final    Anion Gap 03/07/2020 8  8 - 16 mmol/L Final    eGFR if  03/07/2020 33* >60 mL/min/1.73 m^2 Final    eGFR if non African American 03/07/2020 28* >60 mL/min/1.73 m^2 Final    Phosphorus 03/07/2020 2.6* 2.7 - 4.5 mg/dL Final   No results displayed because visit has over 200 results.       No results displayed because visit has over 200 results.      There may be more visits with results that are not included.       Past Medical History:   Diagnosis Date    Chronic kidney disease, stage III (moderate) 1/7/2018    Diabetes mellitus, type 2     Fatigue     Hypertension     Iron deficiency anemia due to chronic blood loss 1/7/2018    Vulvar cancer 1/7/2019     Past Surgical History:   Procedure Laterality Date    ABDOMINAL SURGERY      AV FISTULA PLACEMENT Left 2018    BACK SURGERY      BLADDER FULGURATION N/A 6/16/2020    Procedure: FULGURATION, BLADDER;  Surgeon: Damari Barber Jr., MD;  Location: [facility];  Service: Urology;  Laterality: N/A;    carpel tunnel surgery once on each hand      CERVICAL FUSION      x 4    cervix repair      CHOLECYSTECTOMY  2000    CYSTOSCOPY W/ RETROGRADES  6/16/2020    Procedure: CYSTOSCOPY, WITH RETROGRADE PYELOGRAM;  Surgeon: Damari Barber Jr., MD;  Location: [facility];  Service: Urology;;    DIAGNOSTIC LAPAROSCOPY N/A 2/13/2020    Procedure: LAPAROSCOPY, DIAGNOSTIC;  Surgeon: Robbi Lovell III, MD;  Location: [facility];  Service: General;  Laterality: N/A;    HYSTERECTOMY  1980    ILEOSTOMY N/A 2/13/2020    Procedure: CREATION, ILEOSTOMY Loop;  Surgeon: Robbi Lovell III, MD;  Location: [facility];  Service: General;  Laterality: N/A;    ILEOSTOMY CLOSURE N/A 5/18/2020    Procedure: CLOSURE, ILEOSTOMY;  Surgeon: Robbi Lovell III, MD;  Location: [facility];  Service: General;  Laterality: N/A;    LUMBAR LAMINECTOMY      LYSIS OF ADHESIONS N/A 2/13/2020    Procedure: LYSIS, ADHESIONS;  Surgeon: Robbi Lovell III, MD;  Location: [facility];  Service: General;  Laterality: N/A;    NECK SURGERY      PHLEBOGRAPHY  2/28/2020    Procedure: VENOGRAM;  Surgeon: Robbi Lovell III, MD;  Location: [facility];  Service: General;;    REMOVAL OF VASCULAR ACCESS PORT Right 2/13/2020    Procedure: REMOVAL, VASCULAR ACCESS PORT;  Surgeon: Robbi LEE  Nas MANZANARES MD;  Location: [facility] OR;  Service: General;  Laterality: Right;    SHOULDER SURGERY      vulva cancer       Family History   Problem Relation Age of Onset    Heart disease Mother     Hypertension Mother     Cancer Mother         lung    Heart disease Father     Heart disease Sister         Open heart surgery    No Known Problems Daughter        Marital Status:   Alcohol History:  reports no history of alcohol use.  Tobacco History:  reports that she quit smoking about 20 years ago. Her smoking use included cigarettes. She has a 33.00 pack-year smoking history. She quit smokeless tobacco use about 20 years ago.  Drug History:  reports no history of drug use.    Review of patient's allergies indicates:   Allergen Reactions    Ciprofloxacin Other (See Comments)     Elevated liver enzymes      Pcn [penicillins] Anaphylaxis     TOLERATES ROCEPHIN WITHOUT DIFFICULTY       Current Outpatient Medications:     calcitRIOL (ROCALTROL) 0.5 MCG Cap, Take 0.5 mcg by mouth once daily., Disp: , Rfl:     cholestyramine-aspartame (QUESTRAN LIGHT) 4 gram PwPk, Take 1 packet (4 g total) by mouth 2 (two) times daily. for 21 days, Disp: 42 packet, Rfl: 0    diltiaZEM (CARDIZEM CD) 120 MG Cp24, Take 120 mg by mouth once daily., Disp: , Rfl:     diphenoxylate-atropine 2.5-0.025 mg (LOMOTIL) 2.5-0.025 mg per tablet, Take 1 tablet by mouth 4 (four) times daily as needed for Diarrhea., Disp: 20 tablet, Rfl: 0    ferrous sulfate 324 mg (65 mg iron) TbEC, Take 325 mg by mouth once daily., Disp: , Rfl:     levothyroxine (SYNTHROID) 88 MCG tablet, Take 1 tablet (88 mcg total) by mouth before breakfast., Disp: 30 tablet, Rfl: 11    magnesium oxide (MAG-OX) 400 mg (241.3 mg magnesium) tablet, Take 800 mg by mouth once daily., Disp: , Rfl:     mirtazapine (REMERON) 30 MG tablet, Take 1 tablet (30 mg total) by mouth every evening., Disp: 30 tablet, Rfl: 2    sodium bicarbonate 325 MG tablet, Take 2 tablets (650  mg total) by mouth 2 (two) times daily., Disp: 360 tablet, Rfl: 1    traMADoL (ULTRAM) 50 mg tablet, Take 1 tablet (50 mg total) by mouth every 4 (four) hours as needed for Pain., Disp: 42 tablet, Rfl: 0    megestroL (MEGACE) 400 mg/10 mL (40 mg/mL) Susp, 10cc daily-400 mg daily, Disp: 3000 mL, Rfl: 1    Review of Systems   Constitutional: Positive for unexpected weight change (126-117megacemegace). Negative for appetite change (no appetite), chills, diaphoresis, fatigue and fever.   HENT: Negative for congestion, ear pain, hearing loss, nosebleeds, postnasal drip, sinus pressure, sinus pain, sneezing, sore throat, tinnitus, trouble swallowing and voice change.    Eyes: Negative for photophobia, pain, itching and visual disturbance.   Respiratory: Negative for apnea, cough, chest tightness, shortness of breath, wheezing and stridor.    Cardiovascular: Negative for chest pain, palpitations and leg swelling.   Gastrointestinal: Negative for abdominal distention, abdominal pain, blood in stool, constipation, diarrhea, nausea and vomiting.        Chronic diarrhea and hx recurrent colitis/ileitis with blood loss   Endocrine: Negative for cold intolerance, heat intolerance, polydipsia and polyuria.   Genitourinary: Negative for difficulty urinating, dyspareunia, dysuria, flank pain, frequency, hematuria, menstrual problem, pelvic pain, urgency, vaginal discharge and vaginal pain.        Chronic renal diseasse   Musculoskeletal: Negative for arthralgias, back pain, joint swelling, myalgias, neck pain and neck stiffness.   Skin: Positive for color change (chronic pallor) and pallor.   Allergic/Immunologic: Negative for environmental allergies and food allergies.   Neurological: Positive for dizziness, weakness and light-headedness. Negative for tremors, speech difficulty and numbness.   Hematological: Bruises/bleeds easily.   Psychiatric/Behavioral: Negative for agitation, confusion, decreased concentration, sleep  "disturbance and suicidal ideas. The patient is not nervous/anxious.           Objective:      Vitals:    07/30/20 1452   BP: 112/60   Pulse: 80   Resp: 16   Temp: 98.2 °F (36.8 °C)   SpO2: 95%   Weight: 53.5 kg (118 lb)   Height: 5' 7" (1.702 m)     Physical Exam  Vitals signs and nursing note reviewed.   Constitutional:       General: She is not in acute distress.     Appearance: She is well-developed. She is ill-appearing (appears debilitated). She is not diaphoretic.      Comments: Very thin female in NAD   HENT:      Head: Normocephalic and atraumatic.      Right Ear: External ear normal. There is no impacted cerumen.      Left Ear: External ear normal. There is no impacted cerumen.      Nose: Nose normal.      Mouth/Throat:      Mouth: Mucous membranes are moist.      Pharynx: Oropharynx is clear. Uvula midline.   Eyes:      General: No scleral icterus.        Right eye: No discharge.         Left eye: No discharge.      Extraocular Movements: Extraocular movements intact.      Conjunctiva/sclera: Conjunctivae normal.      Pupils: Pupils are equal, round, and reactive to light.      Right eye: Pupil is round and reactive.      Left eye: Pupil is round and reactive.   Neck:      Musculoskeletal: Normal range of motion and neck supple.      Thyroid: No thyromegaly.      Vascular: No carotid bruit or JVD.      Trachea: Trachea normal.   Cardiovascular:      Rate and Rhythm: Normal rate and regular rhythm.      Pulses: Normal pulses.      Heart sounds: Normal heart sounds. No murmur. No friction rub. No gallop.       Comments: Loud S4  Pulmonary:      Effort: Pulmonary effort is normal. No respiratory distress.      Breath sounds: Normal breath sounds. No wheezing or rales.   Abdominal:      General: There is no distension.      Palpations: Abdomen is soft. There is no mass.      Tenderness: There is no abdominal tenderness. There is no right CVA tenderness, left CVA tenderness, guarding or rebound.      Hernia: No " hernia is present.   Musculoskeletal: Normal range of motion.         General: No swelling, tenderness, deformity or signs of injury.      Right lower leg: No edema.      Left lower leg: No edema.   Skin:     General: Skin is warm and dry.      Coloration: Skin is pale.      Findings: No bruising, erythema, lesion or rash.      Nails: There is no clubbing.     Neurological:      General: No focal deficit present.      Mental Status: She is alert and oriented to person, place, and time.      Cranial Nerves: No cranial nerve deficit.      Sensory: No sensory deficit.      Motor: No weakness.      Coordination: Coordination normal.      Gait: Gait normal.      Deep Tendon Reflexes: Reflexes normal.   Psychiatric:         Mood and Affect: Mood normal.         Speech: Speech normal.         Behavior: Behavior normal. Behavior is cooperative.         Thought Content: Thought content normal.         Judgment: Judgment normal.           Assessment:       1. Weight loss of more than 10% body weight    2. Malnutrition related to chronic disease    3. Chronic renal failure, stage 4 (severe)    4. Iron deficiency anemia due to chronic blood loss         Plan:       Weight loss of more than 10% body weight  -     CBC auto differential; Future; Expected date: 07/30/2020  -     Comprehensive metabolic panel; Future; Expected date: 07/30/2020  -     megestroL (MEGACE) 400 mg/10 mL (40 mg/mL) Susp; 10cc daily-400 mg daily  Dispense: 3000 mL; Refill: 1  -     Prealbumin; Future; Expected date: 07/30/2020    Malnutrition related to chronic disease  -     CBC auto differential; Future; Expected date: 07/30/2020  -     Comprehensive metabolic panel; Future; Expected date: 07/30/2020  -     megestroL (MEGACE) 400 mg/10 mL (40 mg/mL) Susp; 10cc daily-400 mg daily  Dispense: 3000 mL; Refill: 1  -     Prealbumin; Future; Expected date: 07/30/2020    Chronic renal failure, stage 4 (severe)  -     CBC auto differential; Future; Expected date:  07/30/2020  -     Comprehensive metabolic panel; Future; Expected date: 07/30/2020  -     megestroL (MEGACE) 400 mg/10 mL (40 mg/mL) Susp; 10cc daily-400 mg daily  Dispense: 3000 mL; Refill: 1  -     Prealbumin; Future; Expected date: 07/30/2020    Iron deficiency anemia due to chronic blood loss    Other orders  -     sodium bicarbonate 325 MG tablet; Take 2 tablets (650 mg total) by mouth 2 (two) times daily.  Dispense: 360 tablet; Refill: 1      No follow-ups on file.        8/1/2020 April Horton M.D.

## 2020-07-28 ENCOUNTER — EXTERNAL HOME HEALTH (OUTPATIENT)
Dept: HOME HEALTH SERVICES | Facility: HOSPITAL | Age: 70
End: 2020-07-28

## 2020-07-29 DIAGNOSIS — K52.9 CHRONIC DIARRHEA: Primary | Chronic | ICD-10-CM

## 2020-07-29 RX ORDER — DIPHENOXYLATE HYDROCHLORIDE AND ATROPINE SULFATE 2.5; .025 MG/1; MG/1
1 TABLET ORAL 4 TIMES DAILY PRN
Qty: 20 TABLET | Refills: 0 | Status: SHIPPED | OUTPATIENT
Start: 2020-07-29 | End: 2020-08-08

## 2020-07-29 NOTE — TELEPHONE ENCOUNTER
A[facility] of [facility] called [phone], pt c/o Diarrhea since yesterday, using OTC Immodium, lost 3 lbs    She has an appointment tomorrow with Dr Horton    She is taking K daily, sodium bicarb 650 Bid, Diphenhydramine hs, she D/Cd her oxybutnin and fiber        She is asking for RX   Of Lomotil                    Pended if ok

## 2020-07-30 ENCOUNTER — OFFICE VISIT (OUTPATIENT)
Dept: FAMILY MEDICINE | Facility: CLINIC | Age: 70
End: 2020-07-30
Payer: MEDICARE

## 2020-07-30 VITALS
HEIGHT: 67 IN | SYSTOLIC BLOOD PRESSURE: 112 MMHG | TEMPERATURE: 98 F | WEIGHT: 118 LBS | HEART RATE: 80 BPM | DIASTOLIC BLOOD PRESSURE: 60 MMHG | RESPIRATION RATE: 16 BRPM | BODY MASS INDEX: 18.52 KG/M2 | OXYGEN SATURATION: 95 %

## 2020-07-30 DIAGNOSIS — D50.0 IRON DEFICIENCY ANEMIA DUE TO CHRONIC BLOOD LOSS: ICD-10-CM

## 2020-07-30 DIAGNOSIS — E46 MALNUTRITION RELATED TO CHRONIC DISEASE: ICD-10-CM

## 2020-07-30 DIAGNOSIS — R63.4 WEIGHT LOSS OF MORE THAN 10% BODY WEIGHT: Primary | ICD-10-CM

## 2020-07-30 DIAGNOSIS — N18.4 CHRONIC RENAL FAILURE, STAGE 4 (SEVERE): ICD-10-CM

## 2020-07-30 PROCEDURE — 3008F PR BODY MASS INDEX (BMI) DOCUMENTED: ICD-10-PCS | Mod: S$GLB,,, | Performed by: INTERNAL MEDICINE

## 2020-07-30 PROCEDURE — 99214 PR OFFICE/OUTPT VISIT, EST, LEVL IV, 30-39 MIN: ICD-10-PCS | Mod: S$GLB,,, | Performed by: INTERNAL MEDICINE

## 2020-07-30 PROCEDURE — 1126F PR PAIN SEVERITY QUANTIFIED, NO PAIN PRESENT: ICD-10-PCS | Mod: S$GLB,,, | Performed by: INTERNAL MEDICINE

## 2020-07-30 PROCEDURE — 3008F BODY MASS INDEX DOCD: CPT | Mod: S$GLB,,, | Performed by: INTERNAL MEDICINE

## 2020-07-30 PROCEDURE — 3078F PR MOST RECENT DIASTOLIC BLOOD PRESSURE < 80 MM HG: ICD-10-PCS | Mod: S$GLB,,, | Performed by: INTERNAL MEDICINE

## 2020-07-30 PROCEDURE — 1101F PT FALLS ASSESS-DOCD LE1/YR: CPT | Mod: S$GLB,,, | Performed by: INTERNAL MEDICINE

## 2020-07-30 PROCEDURE — 1126F AMNT PAIN NOTED NONE PRSNT: CPT | Mod: S$GLB,,, | Performed by: INTERNAL MEDICINE

## 2020-07-30 PROCEDURE — 1159F MED LIST DOCD IN RCRD: CPT | Mod: S$GLB,,, | Performed by: INTERNAL MEDICINE

## 2020-07-30 PROCEDURE — 3078F DIAST BP <80 MM HG: CPT | Mod: S$GLB,,, | Performed by: INTERNAL MEDICINE

## 2020-07-30 PROCEDURE — 99214 OFFICE O/P EST MOD 30 MIN: CPT | Mod: S$GLB,,, | Performed by: INTERNAL MEDICINE

## 2020-07-30 PROCEDURE — 3074F PR MOST RECENT SYSTOLIC BLOOD PRESSURE < 130 MM HG: ICD-10-PCS | Mod: S$GLB,,, | Performed by: INTERNAL MEDICINE

## 2020-07-30 PROCEDURE — 3074F SYST BP LT 130 MM HG: CPT | Mod: S$GLB,,, | Performed by: INTERNAL MEDICINE

## 2020-07-30 PROCEDURE — 1159F PR MEDICATION LIST DOCUMENTED IN MEDICAL RECORD: ICD-10-PCS | Mod: S$GLB,,, | Performed by: INTERNAL MEDICINE

## 2020-07-30 PROCEDURE — 1101F PR PT FALLS ASSESS DOC 0-1 FALLS W/OUT INJ PAST YR: ICD-10-PCS | Mod: S$GLB,,, | Performed by: INTERNAL MEDICINE

## 2020-07-30 RX ORDER — SODIUM BICARBONATE 325 MG/1
650 TABLET ORAL 2 TIMES DAILY
Qty: 360 TABLET | Refills: 1 | Status: ON HOLD | OUTPATIENT
Start: 2020-07-30 | End: 2021-03-17 | Stop reason: HOSPADM

## 2020-07-30 RX ORDER — MEGESTROL ACETATE 40 MG/ML
SUSPENSION ORAL
Qty: 3000 ML | Refills: 1 | Status: SHIPPED | OUTPATIENT
Start: 2020-07-30 | End: 2020-12-09

## 2020-07-30 NOTE — TELEPHONE ENCOUNTER
Pt called Rx too expensive, she will contact her insurance to check for other meds that are covered

## 2020-08-05 ENCOUNTER — LAB VISIT (OUTPATIENT)
Dept: LAB | Facility: HOSPITAL | Age: 70
End: 2020-08-05
Attending: INTERNAL MEDICINE
Payer: MEDICARE

## 2020-08-05 DIAGNOSIS — N18.4 CHRONIC KIDNEY DISEASE, STAGE IV (SEVERE): Primary | ICD-10-CM

## 2020-08-05 DIAGNOSIS — E46 UNSPECIFIED PROTEIN-CALORIE MALNUTRITION: ICD-10-CM

## 2020-08-05 LAB
ALBUMIN SERPL BCP-MCNC: 3.6 G/DL (ref 3.5–5.2)
ALP SERPL-CCNC: 182 U/L (ref 55–135)
ALT SERPL W/O P-5'-P-CCNC: 15 U/L (ref 10–44)
ANION GAP SERPL CALC-SCNC: 10 MMOL/L (ref 8–16)
AST SERPL-CCNC: 15 U/L (ref 10–40)
BASOPHILS # BLD AUTO: 0.01 K/UL (ref 0–0.2)
BASOPHILS NFR BLD: 0.2 % (ref 0–1.9)
BILIRUB SERPL-MCNC: 0.4 MG/DL (ref 0.1–1)
BUN SERPL-MCNC: 54 MG/DL (ref 8–23)
CALCIUM SERPL-MCNC: 8.7 MG/DL (ref 8.7–10.5)
CHLORIDE SERPL-SCNC: 115 MMOL/L (ref 95–110)
CO2 SERPL-SCNC: 17 MMOL/L (ref 23–29)
CREAT SERPL-MCNC: 3.5 MG/DL (ref 0.5–1.4)
DIFFERENTIAL METHOD: ABNORMAL
EOSINOPHIL # BLD AUTO: 0.1 K/UL (ref 0–0.5)
EOSINOPHIL NFR BLD: 1.9 % (ref 0–8)
ERYTHROCYTE [DISTWIDTH] IN BLOOD BY AUTOMATED COUNT: 15 % (ref 11.5–14.5)
EST. GFR  (AFRICAN AMERICAN): 14.5 ML/MIN/1.73 M^2
EST. GFR  (NON AFRICAN AMERICAN): 12.6 ML/MIN/1.73 M^2
GLUCOSE SERPL-MCNC: 66 MG/DL (ref 70–110)
HCT VFR BLD AUTO: 29.1 % (ref 37–48.5)
HGB BLD-MCNC: 8.8 G/DL (ref 12–16)
IMM GRANULOCYTES # BLD AUTO: 0.01 K/UL (ref 0–0.04)
IMM GRANULOCYTES NFR BLD AUTO: 0.2 % (ref 0–0.5)
LYMPHOCYTES # BLD AUTO: 0.8 K/UL (ref 1–4.8)
LYMPHOCYTES NFR BLD: 16 % (ref 18–48)
MCH RBC QN AUTO: 33 PG (ref 27–31)
MCHC RBC AUTO-ENTMCNC: 30.2 G/DL (ref 32–36)
MCV RBC AUTO: 109 FL (ref 82–98)
MONOCYTES # BLD AUTO: 0.4 K/UL (ref 0.3–1)
MONOCYTES NFR BLD: 7.6 % (ref 4–15)
NEUTROPHILS # BLD AUTO: 3.9 K/UL (ref 1.8–7.7)
NEUTROPHILS NFR BLD: 74.1 % (ref 38–73)
NRBC BLD-RTO: 0 /100 WBC
PLATELET # BLD AUTO: 208 K/UL (ref 150–350)
PMV BLD AUTO: 10.3 FL (ref 9.2–12.9)
POTASSIUM SERPL-SCNC: 5.7 MMOL/L (ref 3.5–5.1)
PREALB SERPL-MCNC: 9 MG/DL (ref 20–43)
PROT SERPL-MCNC: 6.4 G/DL (ref 6–8.4)
RBC # BLD AUTO: 2.67 M/UL (ref 4–5.4)
SODIUM SERPL-SCNC: 142 MMOL/L (ref 136–145)
WBC # BLD AUTO: 5.26 K/UL (ref 3.9–12.7)

## 2020-08-05 PROCEDURE — 84134 ASSAY OF PREALBUMIN: CPT

## 2020-08-05 PROCEDURE — 85025 COMPLETE CBC W/AUTO DIFF WBC: CPT

## 2020-08-05 PROCEDURE — 80053 COMPREHEN METABOLIC PANEL: CPT

## 2020-08-06 ENCOUNTER — TELEPHONE (OUTPATIENT)
Dept: FAMILY MEDICINE | Facility: CLINIC | Age: 70
End: 2020-08-06

## 2020-08-06 DIAGNOSIS — E87.5 HYPERKALEMIA: Primary | ICD-10-CM

## 2020-08-06 NOTE — TELEPHONE ENCOUNTER
Ms Cuong called about her lab results she sees on portal    Her potassium is high and Hgb low, asking for Dr Horton to review

## 2020-08-08 ENCOUNTER — LAB VISIT (OUTPATIENT)
Dept: LAB | Facility: HOSPITAL | Age: 70
End: 2020-08-08
Attending: INTERNAL MEDICINE
Payer: MEDICARE

## 2020-08-08 DIAGNOSIS — E87.5 HYPERKALEMIA: ICD-10-CM

## 2020-08-08 LAB — POTASSIUM SERPL-SCNC: 4.5 MMOL/L (ref 3.5–5.1)

## 2020-08-08 PROCEDURE — 84132 ASSAY OF SERUM POTASSIUM: CPT

## 2020-08-08 PROCEDURE — 36415 COLL VENOUS BLD VENIPUNCTURE: CPT

## 2020-08-12 ENCOUNTER — TELEPHONE (OUTPATIENT)
Dept: FAMILY MEDICINE | Facility: CLINIC | Age: 70
End: 2020-08-12

## 2020-08-21 ENCOUNTER — HOSPITAL ENCOUNTER (OUTPATIENT)
Dept: RADIOLOGY | Facility: HOSPITAL | Age: 70
Discharge: HOME | End: 2020-08-21
Attending: INTERNAL MEDICINE
Payer: MEDICARE

## 2020-08-21 DIAGNOSIS — R05.9 COUGH: Primary | ICD-10-CM

## 2020-08-21 DIAGNOSIS — R05.9 COUGH: ICD-10-CM

## 2020-08-21 PROCEDURE — 71046 X-RAY EXAM CHEST 2 VIEWS: CPT | Mod: TC

## 2020-08-28 ENCOUNTER — LAB VISIT (OUTPATIENT)
Dept: LAB | Facility: HOSPITAL | Age: 70
End: 2020-08-28
Attending: INTERNAL MEDICINE
Payer: MEDICARE

## 2020-08-28 DIAGNOSIS — D64.9 ANEMIA, UNSPECIFIED: Primary | ICD-10-CM

## 2020-08-28 LAB
BASOPHILS # BLD AUTO: 0.03 K/UL (ref 0–0.2)
BASOPHILS NFR BLD: 0.4 % (ref 0–1.9)
DIFFERENTIAL METHOD: ABNORMAL
EOSINOPHIL # BLD AUTO: 0.2 K/UL (ref 0–0.5)
EOSINOPHIL NFR BLD: 1.8 % (ref 0–8)
ERYTHROCYTE [DISTWIDTH] IN BLOOD BY AUTOMATED COUNT: 14.6 % (ref 11.5–14.5)
HCT VFR BLD AUTO: 29.2 % (ref 37–48.5)
HGB BLD-MCNC: 8.7 G/DL (ref 12–16)
IMM GRANULOCYTES # BLD AUTO: 0.02 K/UL (ref 0–0.04)
IMM GRANULOCYTES NFR BLD AUTO: 0.2 % (ref 0–0.5)
LYMPHOCYTES # BLD AUTO: 1.2 K/UL (ref 1–4.8)
LYMPHOCYTES NFR BLD: 14.1 % (ref 18–48)
MCH RBC QN AUTO: 32.2 PG (ref 27–31)
MCHC RBC AUTO-ENTMCNC: 29.8 G/DL (ref 32–36)
MCV RBC AUTO: 108 FL (ref 82–98)
MONOCYTES # BLD AUTO: 0.4 K/UL (ref 0.3–1)
MONOCYTES NFR BLD: 4.3 % (ref 4–15)
NEUTROPHILS # BLD AUTO: 6.4 K/UL (ref 1.8–7.7)
NEUTROPHILS NFR BLD: 79.2 % (ref 38–73)
NRBC BLD-RTO: 0 /100 WBC
PLATELET # BLD AUTO: 284 K/UL (ref 150–350)
PMV BLD AUTO: 10.4 FL (ref 9.2–12.9)
RBC # BLD AUTO: 2.7 M/UL (ref 4–5.4)
WBC # BLD AUTO: 8.13 K/UL (ref 3.9–12.7)

## 2020-08-28 PROCEDURE — 83540 ASSAY OF IRON: CPT

## 2020-08-28 PROCEDURE — 82728 ASSAY OF FERRITIN: CPT

## 2020-08-28 PROCEDURE — 84100 ASSAY OF PHOSPHORUS: CPT

## 2020-08-28 PROCEDURE — 80076 HEPATIC FUNCTION PANEL: CPT

## 2020-08-28 PROCEDURE — 85025 COMPLETE CBC W/AUTO DIFF WBC: CPT

## 2020-08-29 LAB
ALBUMIN SERPL BCP-MCNC: 3.9 G/DL (ref 3.5–5.2)
ALP SERPL-CCNC: 181 U/L (ref 55–135)
ALT SERPL W/O P-5'-P-CCNC: 14 U/L (ref 10–44)
AST SERPL-CCNC: 16 U/L (ref 10–40)
BILIRUB DIRECT SERPL-MCNC: 0.2 MG/DL (ref 0.1–0.3)
BILIRUB SERPL-MCNC: 0.5 MG/DL (ref 0.1–1)
FERRITIN SERPL-MCNC: 569 NG/ML (ref 20–300)
IRON SERPL-MCNC: 87 UG/DL (ref 30–160)
IRON SERPL-MCNC: 87 UG/DL (ref 30–160)
PHOSPHATE SERPL-MCNC: 5.5 MG/DL (ref 2.7–4.5)
PROT SERPL-MCNC: 6.9 G/DL (ref 6–8.4)
SATURATED IRON: 32 % (ref 20–50)
TOTAL IRON BINDING CAPACITY: 269 UG/DL (ref 250–450)
TRANSFERRIN SERPL-MCNC: 182 MG/DL (ref 200–375)

## 2020-09-17 ENCOUNTER — PATIENT MESSAGE (OUTPATIENT)
Dept: FAMILY MEDICINE | Facility: CLINIC | Age: 70
End: 2020-09-17

## 2020-09-24 ENCOUNTER — DOCUMENT SCAN (OUTPATIENT)
Dept: HOME HEALTH SERVICES | Facility: HOSPITAL | Age: 70
End: 2020-09-24

## 2020-10-05 DIAGNOSIS — K13.79 MOUTH PAIN: ICD-10-CM

## 2020-10-05 RX ORDER — TRAMADOL HYDROCHLORIDE 50 MG/1
50 TABLET ORAL EVERY 4 HOURS PRN
Qty: 42 TABLET | Refills: 0 | Status: SHIPPED | OUTPATIENT
Start: 2020-10-05 | End: 2020-12-02 | Stop reason: SDUPTHER

## 2020-12-01 ENCOUNTER — PATIENT MESSAGE (OUTPATIENT)
Dept: FAMILY MEDICINE | Facility: CLINIC | Age: 70
End: 2020-12-01

## 2020-12-01 DIAGNOSIS — R11.2 NAUSEA AND VOMITING, INTRACTABILITY OF VOMITING NOT SPECIFIED, UNSPECIFIED VOMITING TYPE: Primary | ICD-10-CM

## 2020-12-01 DIAGNOSIS — K13.79 MOUTH PAIN: ICD-10-CM

## 2020-12-02 ENCOUNTER — TELEPHONE (OUTPATIENT)
Dept: FAMILY MEDICINE | Facility: CLINIC | Age: 70
End: 2020-12-02

## 2020-12-02 RX ORDER — TRAMADOL HYDROCHLORIDE 50 MG/1
50 TABLET ORAL EVERY 4 HOURS PRN
Qty: 42 TABLET | Refills: 0 | Status: SHIPPED | OUTPATIENT
Start: 2020-12-02 | End: 2021-02-09 | Stop reason: SDUPTHER

## 2020-12-02 RX ORDER — PROMETHAZINE HYDROCHLORIDE 25 MG/1
25 TABLET ORAL 2 TIMES DAILY
Qty: 14 TABLET | Refills: 0 | Status: SHIPPED | OUTPATIENT
Start: 2020-12-02 | End: 2020-12-09

## 2020-12-02 NOTE — TELEPHONE ENCOUNTER
The following medication needs a prior authorization:     Medication Name: Promethazine    Dosage: 25 mg    Frequency: 2 times a day    Directions for use: Take 1 tablet by mouth 2 times daily    Diagnosis: nausea and vomiting    Is the request for a reauthorization? no    Is the patient currently stable on therapy? yes    Please list all therapeutic alternatives previously used with start/end dates and outcome:

## 2020-12-08 ENCOUNTER — HOSPITAL ENCOUNTER (INPATIENT)
Facility: HOSPITAL | Age: 70
LOS: 5 days | Discharge: HOME | DRG: 602 | End: 2020-12-15
Attending: EMERGENCY MEDICINE | Admitting: INTERNAL MEDICINE
Payer: MEDICARE

## 2020-12-08 DIAGNOSIS — K52.9 CHRONIC DIARRHEA: Chronic | ICD-10-CM

## 2020-12-08 DIAGNOSIS — J18.9 PNEUMONIA DUE TO INFECTIOUS ORGANISM, UNSPECIFIED LATERALITY, UNSPECIFIED PART OF LUNG: ICD-10-CM

## 2020-12-08 DIAGNOSIS — L03.315 CELLULITIS OF PERINEUM: ICD-10-CM

## 2020-12-08 DIAGNOSIS — L03.317 CELLULITIS OF BUTTOCK: ICD-10-CM

## 2020-12-08 DIAGNOSIS — K61.0 PERIANAL CELLULITIS: Primary | ICD-10-CM

## 2020-12-08 DIAGNOSIS — R07.9 CHEST PAIN: ICD-10-CM

## 2020-12-08 DIAGNOSIS — R21: ICD-10-CM

## 2020-12-08 DIAGNOSIS — R11.2 NAUSEA AND VOMITING, INTRACTABILITY OF VOMITING NOT SPECIFIED, UNSPECIFIED VOMITING TYPE: ICD-10-CM

## 2020-12-08 LAB
ABO + RH BLD: NORMAL
ALBUMIN SERPL BCP-MCNC: 3.9 G/DL (ref 3.5–5.2)
ALLENS TEST: ABNORMAL
ALP SERPL-CCNC: 135 U/L (ref 55–135)
ALT SERPL W/O P-5'-P-CCNC: 18 U/L (ref 10–44)
AMPHET+METHAMPHET UR QL: NEGATIVE
ANION GAP SERPL CALC-SCNC: 10 MMOL/L (ref 8–16)
AST SERPL-CCNC: 18 U/L (ref 10–40)
BACTERIA #/AREA URNS HPF: ABNORMAL /HPF
BARBITURATES UR QL SCN>200 NG/ML: NEGATIVE
BASOPHILS # BLD AUTO: 0.02 K/UL (ref 0–0.2)
BASOPHILS NFR BLD: 0.2 % (ref 0–1.9)
BENZODIAZ UR QL SCN>200 NG/ML: NEGATIVE
BILIRUB SERPL-MCNC: 0.9 MG/DL (ref 0.1–1)
BILIRUB UR QL STRIP: NEGATIVE
BLD GP AB SCN CELLS X3 SERPL QL: NORMAL
BNP SERPL-MCNC: 81 PG/ML (ref 0–99)
BUN SERPL-MCNC: 24 MG/DL (ref 8–23)
BZE UR QL SCN: NEGATIVE
CALCIUM SERPL-MCNC: 8.8 MG/DL (ref 8.7–10.5)
CANNABINOIDS UR QL SCN: NEGATIVE
CHLORIDE SERPL-SCNC: 104 MMOL/L (ref 95–110)
CK MB SERPL-MCNC: 1.7 NG/ML (ref 0.1–6.5)
CK SERPL-CCNC: 55 U/L (ref 20–180)
CLARITY UR: ABNORMAL
CO2 SERPL-SCNC: 24 MMOL/L (ref 23–29)
COLOR UR: ABNORMAL
CREAT SERPL-MCNC: 2.4 MG/DL (ref 0.5–1.4)
CREAT UR-MCNC: 38 MG/DL (ref 15–325)
CRP SERPL-MCNC: 0.46 MG/DL
DELSYS: ABNORMAL
DIFFERENTIAL METHOD: ABNORMAL
EOSINOPHIL # BLD AUTO: 0.1 K/UL (ref 0–0.5)
EOSINOPHIL NFR BLD: 1.2 % (ref 0–8)
ERYTHROCYTE [DISTWIDTH] IN BLOOD BY AUTOMATED COUNT: 14.5 % (ref 11.5–14.5)
EST. GFR  (AFRICAN AMERICAN): 22.9 ML/MIN/1.73 M^2
EST. GFR  (NON AFRICAN AMERICAN): 19.9 ML/MIN/1.73 M^2
FERRITIN SERPL-MCNC: 378 NG/ML (ref 20–300)
GLUCOSE SERPL-MCNC: 86 MG/DL (ref 70–110)
GLUCOSE UR QL STRIP: NEGATIVE
HCO3 UR-SCNC: 22.5 MMOL/L (ref 24–28)
HCT VFR BLD AUTO: 39.6 % (ref 37–48.5)
HGB BLD-MCNC: 12.1 G/DL (ref 12–16)
HGB UR QL STRIP: ABNORMAL
HYALINE CASTS #/AREA URNS LPF: 28 /LPF
IMM GRANULOCYTES # BLD AUTO: 0.04 K/UL (ref 0–0.04)
IMM GRANULOCYTES NFR BLD AUTO: 0.5 % (ref 0–0.5)
INR PPP: 1.8
KETONES UR QL STRIP: NEGATIVE
LDH SERPL L TO P-CCNC: 163 U/L (ref 110–260)
LEUKOCYTE ESTERASE UR QL STRIP: ABNORMAL
LYMPHOCYTES # BLD AUTO: 1.5 K/UL (ref 1–4.8)
LYMPHOCYTES NFR BLD: 17.7 % (ref 18–48)
MAGNESIUM SERPL-MCNC: 1.5 MG/DL (ref 1.6–2.6)
MCH RBC QN AUTO: 33.2 PG (ref 27–31)
MCHC RBC AUTO-ENTMCNC: 30.6 G/DL (ref 32–36)
MCV RBC AUTO: 109 FL (ref 82–98)
MICROSCOPIC COMMENT: ABNORMAL
MODE: ABNORMAL
MONOCYTES # BLD AUTO: 0.6 K/UL (ref 0.3–1)
MONOCYTES NFR BLD: 7.6 % (ref 4–15)
NEUTROPHILS # BLD AUTO: 6 K/UL (ref 1.8–7.7)
NEUTROPHILS NFR BLD: 72.8 % (ref 38–73)
NITRITE UR QL STRIP: NEGATIVE
NRBC BLD-RTO: 0 /100 WBC
OPIATES UR QL SCN: NEGATIVE
PCO2 BLDA: 39 MMHG (ref 35–45)
PCP UR QL SCN>25 NG/ML: NEGATIVE
PH SMN: 7.37 [PH] (ref 7.35–7.45)
PH UR STRIP: 7 [PH] (ref 5–8)
PHOSPHATE SERPL-MCNC: 3.5 MG/DL (ref 2.7–4.5)
PLATELET # BLD AUTO: 187 K/UL (ref 150–350)
PMV BLD AUTO: 9.7 FL (ref 9.2–12.9)
PO2 BLDA: 85 MMHG (ref 80–100)
POC BE: -3 MMOL/L
POC SATURATED O2: 96 % (ref 95–100)
POC TCO2: 24 MMOL/L (ref 23–27)
POTASSIUM SERPL-SCNC: 3.3 MMOL/L (ref 3.5–5.1)
PROCALCITONIN SERPL IA-MCNC: 0.3 NG/ML (ref 0–0.5)
PROT SERPL-MCNC: 6.4 G/DL (ref 6–8.4)
PROT UR QL STRIP: ABNORMAL
PROTHROMBIN TIME: 19.9 SEC (ref 10.6–14.8)
RBC # BLD AUTO: 3.64 M/UL (ref 4–5.4)
RBC #/AREA URNS HPF: >100 /HPF (ref 0–4)
SAMPLE: ABNORMAL
SARS-COV-2 RDRP RESP QL NAA+PROBE: NEGATIVE
SITE: ABNORMAL
SODIUM SERPL-SCNC: 138 MMOL/L (ref 136–145)
SP GR UR STRIP: 1.01 (ref 1–1.03)
SP02: 98
SQUAMOUS #/AREA URNS HPF: 3 /HPF
T4 FREE SERPL-MCNC: 0.82 NG/DL (ref 0.71–1.51)
TOXICOLOGY INFORMATION: NORMAL
TROPONIN I SERPL DL<=0.01 NG/ML-MCNC: <0.03 NG/ML
TSH SERPL DL<=0.005 MIU/L-ACNC: 8.66 UIU/ML (ref 0.34–5.6)
URN SPEC COLLECT METH UR: ABNORMAL
UROBILINOGEN UR STRIP-ACNC: NEGATIVE EU/DL
WBC # BLD AUTO: 8.2 K/UL (ref 3.9–12.7)
WBC #/AREA URNS HPF: >100 /HPF (ref 0–5)

## 2020-12-08 PROCEDURE — 87077 CULTURE AEROBIC IDENTIFY: CPT

## 2020-12-08 PROCEDURE — 93010 EKG 12-LEAD: ICD-10-PCS | Mod: ,,, | Performed by: INTERNAL MEDICINE

## 2020-12-08 PROCEDURE — 84439 ASSAY OF FREE THYROXINE: CPT

## 2020-12-08 PROCEDURE — 80053 COMPREHEN METABOLIC PANEL: CPT

## 2020-12-08 PROCEDURE — 80307 DRUG TEST PRSMV CHEM ANLYZR: CPT

## 2020-12-08 PROCEDURE — 87147 CULTURE TYPE IMMUNOLOGIC: CPT

## 2020-12-08 PROCEDURE — 93010 ELECTROCARDIOGRAM REPORT: CPT | Mod: ,,, | Performed by: INTERNAL MEDICINE

## 2020-12-08 PROCEDURE — 99285 EMERGENCY DEPT VISIT HI MDM: CPT | Mod: 25

## 2020-12-08 PROCEDURE — 82550 ASSAY OF CK (CPK): CPT

## 2020-12-08 PROCEDURE — 84100 ASSAY OF PHOSPHORUS: CPT

## 2020-12-08 PROCEDURE — 84443 ASSAY THYROID STIM HORMONE: CPT

## 2020-12-08 PROCEDURE — 83615 LACTATE (LD) (LDH) ENZYME: CPT

## 2020-12-08 PROCEDURE — 96375 TX/PRO/DX INJ NEW DRUG ADDON: CPT

## 2020-12-08 PROCEDURE — 84484 ASSAY OF TROPONIN QUANT: CPT

## 2020-12-08 PROCEDURE — 84145 PROCALCITONIN (PCT): CPT

## 2020-12-08 PROCEDURE — 87186 SC STD MICRODIL/AGAR DIL: CPT

## 2020-12-08 PROCEDURE — 85610 PROTHROMBIN TIME: CPT

## 2020-12-08 PROCEDURE — 86850 RBC ANTIBODY SCREEN: CPT

## 2020-12-08 PROCEDURE — 36415 COLL VENOUS BLD VENIPUNCTURE: CPT

## 2020-12-08 PROCEDURE — 63600175 PHARM REV CODE 636 W HCPCS: Performed by: EMERGENCY MEDICINE

## 2020-12-08 PROCEDURE — 83735 ASSAY OF MAGNESIUM: CPT

## 2020-12-08 PROCEDURE — 81001 URINALYSIS AUTO W/SCOPE: CPT

## 2020-12-08 PROCEDURE — 96365 THER/PROPH/DIAG IV INF INIT: CPT

## 2020-12-08 PROCEDURE — 25000003 PHARM REV CODE 250: Performed by: EMERGENCY MEDICINE

## 2020-12-08 PROCEDURE — 96366 THER/PROPH/DIAG IV INF ADDON: CPT

## 2020-12-08 PROCEDURE — 96376 TX/PRO/DX INJ SAME DRUG ADON: CPT

## 2020-12-08 PROCEDURE — 93005 ELECTROCARDIOGRAM TRACING: CPT | Performed by: INTERNAL MEDICINE

## 2020-12-08 PROCEDURE — 82553 CREATINE MB FRACTION: CPT

## 2020-12-08 PROCEDURE — 85025 COMPLETE CBC W/AUTO DIFF WBC: CPT

## 2020-12-08 PROCEDURE — U0002 COVID-19 LAB TEST NON-CDC: HCPCS

## 2020-12-08 PROCEDURE — 87086 URINE CULTURE/COLONY COUNT: CPT

## 2020-12-08 PROCEDURE — 83880 ASSAY OF NATRIURETIC PEPTIDE: CPT

## 2020-12-08 PROCEDURE — 96368 THER/DIAG CONCURRENT INF: CPT

## 2020-12-08 PROCEDURE — 87040 BLOOD CULTURE FOR BACTERIA: CPT | Mod: 59

## 2020-12-08 PROCEDURE — 82728 ASSAY OF FERRITIN: CPT

## 2020-12-08 PROCEDURE — 86140 C-REACTIVE PROTEIN: CPT

## 2020-12-08 RX ORDER — MEROPENEM AND SODIUM CHLORIDE 1 G/50ML
1 INJECTION, SOLUTION INTRAVENOUS ONCE
Status: COMPLETED | OUTPATIENT
Start: 2020-12-08 | End: 2020-12-08

## 2020-12-08 RX ORDER — ZINC OXIDE 20 G/100G
OINTMENT TOPICAL
Status: DISCONTINUED | OUTPATIENT
Start: 2020-12-08 | End: 2020-12-15 | Stop reason: HOSPADM

## 2020-12-08 RX ORDER — MAGNESIUM SULFATE 1 G/100ML
1 INJECTION INTRAVENOUS ONCE
Status: COMPLETED | OUTPATIENT
Start: 2020-12-08 | End: 2020-12-09

## 2020-12-08 RX ORDER — MORPHINE SULFATE 2 MG/ML
2 INJECTION, SOLUTION INTRAMUSCULAR; INTRAVENOUS
Status: COMPLETED | OUTPATIENT
Start: 2020-12-08 | End: 2020-12-08

## 2020-12-08 RX ORDER — SODIUM CHLORIDE 9 MG/ML
INJECTION, SOLUTION INTRAVENOUS CONTINUOUS
Status: DISCONTINUED | OUTPATIENT
Start: 2020-12-08 | End: 2020-12-09

## 2020-12-08 RX ORDER — DIPHENHYDRAMINE HCL 25 MG
25 CAPSULE ORAL
Status: COMPLETED | OUTPATIENT
Start: 2020-12-08 | End: 2020-12-08

## 2020-12-08 RX ORDER — ONDANSETRON 2 MG/ML
4 INJECTION INTRAMUSCULAR; INTRAVENOUS
Status: COMPLETED | OUTPATIENT
Start: 2020-12-08 | End: 2020-12-08

## 2020-12-08 RX ORDER — VANCOMYCIN HCL IN 5 % DEXTROSE 1G/250ML
15 PLASTIC BAG, INJECTION (ML) INTRAVENOUS ONCE
Status: COMPLETED | OUTPATIENT
Start: 2020-12-08 | End: 2020-12-09

## 2020-12-08 RX ADMIN — ONDANSETRON 4 MG: 2 INJECTION INTRAMUSCULAR; INTRAVENOUS at 08:12

## 2020-12-08 RX ADMIN — SODIUM CHLORIDE 130 ML/HR: 0.9 INJECTION, SOLUTION INTRAVENOUS at 11:12

## 2020-12-08 RX ADMIN — DIPHENHYDRAMINE HYDROCHLORIDE 25 MG: 25 CAPSULE ORAL at 08:12

## 2020-12-08 RX ADMIN — MORPHINE SULFATE 2 MG: 2 INJECTION, SOLUTION INTRAMUSCULAR; INTRAVENOUS at 08:12

## 2020-12-08 RX ADMIN — VANCOMYCIN HYDROCHLORIDE 1000 MG: 1 INJECTION, POWDER, LYOPHILIZED, FOR SOLUTION INTRAVENOUS at 10:12

## 2020-12-08 RX ADMIN — MEROPENEM AND SODIUM CHLORIDE 1 G: 1 INJECTION, SOLUTION INTRAVENOUS at 09:12

## 2020-12-08 RX ADMIN — MORPHINE SULFATE 2 MG: 2 INJECTION, SOLUTION INTRAMUSCULAR; INTRAVENOUS at 11:12

## 2020-12-08 NOTE — FIRST PROVIDER EVALUATION
Medical screening exam completed.  I have conducted a focused provider triage encounter, findings are as follows:    Brief history of present illness:  Cough congestion SOB     There were no vitals filed for this visit.    Pertinent physical exam:  70-year-old female presents emergency department with complaint of cough congestion shortness of breath no chest pain no fever no known contacts with COVID patient end-stage renal disease on hemodialysis M/W/F, also remote h/o vulvar Cancer has cellulitis to buttocks from radiation which is constant but patient reports worse presently     Brief workup plan:  Labs covid    Preliminary workup initiated; this workup will be continued and followed by the physician or advanced practice provider that is assigned to the patient when roomed.

## 2020-12-09 PROBLEM — L03.315 CELLULITIS OF PERINEUM: Status: ACTIVE | Noted: 2020-12-09

## 2020-12-09 PROBLEM — L03.317 CELLULITIS OF BUTTOCK: Status: ACTIVE | Noted: 2020-12-09

## 2020-12-09 PROBLEM — R21 PERINEAL RASH, FEMALE: Status: ACTIVE | Noted: 2020-12-09

## 2020-12-09 LAB
ANION GAP SERPL CALC-SCNC: 8 MMOL/L (ref 8–16)
APTT PPP: 32.8 SEC (ref 23.6–33.3)
BASOPHILS # BLD AUTO: 0.02 K/UL (ref 0–0.2)
BASOPHILS NFR BLD: 0.3 % (ref 0–1.9)
BUN SERPL-MCNC: 24 MG/DL (ref 8–23)
C DIFF GDH STL QL: NEGATIVE
C DIFF TOX A+B STL QL IA: NEGATIVE
CALCIUM SERPL-MCNC: 8.6 MG/DL (ref 8.7–10.5)
CHLORIDE SERPL-SCNC: 105 MMOL/L (ref 95–110)
CO2 SERPL-SCNC: 25 MMOL/L (ref 23–29)
CREAT SERPL-MCNC: 2.5 MG/DL (ref 0.5–1.4)
DIFFERENTIAL METHOD: ABNORMAL
EOSINOPHIL # BLD AUTO: 0.1 K/UL (ref 0–0.5)
EOSINOPHIL NFR BLD: 1.3 % (ref 0–8)
ERYTHROCYTE [DISTWIDTH] IN BLOOD BY AUTOMATED COUNT: 14.3 % (ref 11.5–14.5)
EST. GFR  (AFRICAN AMERICAN): 21.8 ML/MIN/1.73 M^2
EST. GFR  (NON AFRICAN AMERICAN): 18.9 ML/MIN/1.73 M^2
GLUCOSE SERPL-MCNC: 82 MG/DL (ref 70–110)
HCT VFR BLD AUTO: 36.2 % (ref 37–48.5)
HGB BLD-MCNC: 11.1 G/DL (ref 12–16)
IMM GRANULOCYTES # BLD AUTO: 0.03 K/UL (ref 0–0.04)
IMM GRANULOCYTES NFR BLD AUTO: 0.4 % (ref 0–0.5)
INR PPP: 1.6
LYMPHOCYTES # BLD AUTO: 0.9 K/UL (ref 1–4.8)
LYMPHOCYTES NFR BLD: 11.4 % (ref 18–48)
MAGNESIUM SERPL-MCNC: 1.7 MG/DL (ref 1.6–2.6)
MCH RBC QN AUTO: 33.3 PG (ref 27–31)
MCHC RBC AUTO-ENTMCNC: 30.7 G/DL (ref 32–36)
MCV RBC AUTO: 109 FL (ref 82–98)
MONOCYTES # BLD AUTO: 0.7 K/UL (ref 0.3–1)
MONOCYTES NFR BLD: 8.6 % (ref 4–15)
NEUTROPHILS # BLD AUTO: 6 K/UL (ref 1.8–7.7)
NEUTROPHILS NFR BLD: 78 % (ref 38–73)
NRBC BLD-RTO: 0 /100 WBC
PLATELET # BLD AUTO: 153 K/UL (ref 150–350)
PMV BLD AUTO: 10.1 FL (ref 9.2–12.9)
POTASSIUM SERPL-SCNC: 3.5 MMOL/L (ref 3.5–5.1)
PROTHROMBIN TIME: 18.6 SEC (ref 10.6–14.8)
RBC # BLD AUTO: 3.33 M/UL (ref 4–5.4)
SODIUM SERPL-SCNC: 138 MMOL/L (ref 136–145)
WBC # BLD AUTO: 7.7 K/UL (ref 3.9–12.7)

## 2020-12-09 PROCEDURE — 87324 CLOSTRIDIUM AG IA: CPT

## 2020-12-09 PROCEDURE — G0378 HOSPITAL OBSERVATION PER HR: HCPCS

## 2020-12-09 PROCEDURE — 99223 PR INITIAL HOSPITAL CARE,LEVL III: ICD-10-PCS | Mod: ,,, | Performed by: INTERNAL MEDICINE

## 2020-12-09 PROCEDURE — 99223 1ST HOSP IP/OBS HIGH 75: CPT | Mod: ,,, | Performed by: INTERNAL MEDICINE

## 2020-12-09 PROCEDURE — 25000003 PHARM REV CODE 250: Performed by: INTERNAL MEDICINE

## 2020-12-09 PROCEDURE — 80048 BASIC METABOLIC PNL TOTAL CA: CPT

## 2020-12-09 PROCEDURE — 63700000 PHARM REV CODE 250 ALT 637 W/O HCPCS: Performed by: INTERNAL MEDICINE

## 2020-12-09 PROCEDURE — 25000003 PHARM REV CODE 250: Performed by: NURSE PRACTITIONER

## 2020-12-09 PROCEDURE — 85610 PROTHROMBIN TIME: CPT

## 2020-12-09 PROCEDURE — 63600175 PHARM REV CODE 636 W HCPCS: Performed by: INTERNAL MEDICINE

## 2020-12-09 PROCEDURE — 36415 COLL VENOUS BLD VENIPUNCTURE: CPT

## 2020-12-09 PROCEDURE — 85730 THROMBOPLASTIN TIME PARTIAL: CPT

## 2020-12-09 PROCEDURE — 81001 URINALYSIS AUTO W/SCOPE: CPT

## 2020-12-09 PROCEDURE — 87449 NOS EACH ORGANISM AG IA: CPT

## 2020-12-09 PROCEDURE — 25500020 PHARM REV CODE 255: Performed by: EMERGENCY MEDICINE

## 2020-12-09 PROCEDURE — 63600175 PHARM REV CODE 636 W HCPCS: Performed by: EMERGENCY MEDICINE

## 2020-12-09 PROCEDURE — 85025 COMPLETE CBC W/AUTO DIFF WBC: CPT

## 2020-12-09 PROCEDURE — 83735 ASSAY OF MAGNESIUM: CPT

## 2020-12-09 PROCEDURE — 87086 URINE CULTURE/COLONY COUNT: CPT

## 2020-12-09 PROCEDURE — 90935 HEMODIALYSIS ONE EVALUATION: CPT

## 2020-12-09 RX ORDER — TRAMADOL HYDROCHLORIDE 50 MG/1
50 TABLET ORAL EVERY 4 HOURS PRN
Status: DISCONTINUED | OUTPATIENT
Start: 2020-12-09 | End: 2020-12-15 | Stop reason: HOSPADM

## 2020-12-09 RX ORDER — DILTIAZEM HYDROCHLORIDE 120 MG/1
120 CAPSULE, COATED, EXTENDED RELEASE ORAL DAILY
Status: DISCONTINUED | OUTPATIENT
Start: 2020-12-09 | End: 2020-12-15 | Stop reason: HOSPADM

## 2020-12-09 RX ORDER — SODIUM CHLORIDE 0.9 % (FLUSH) 0.9 %
10 SYRINGE (ML) INJECTION
Status: DISCONTINUED | OUTPATIENT
Start: 2020-12-09 | End: 2020-12-15 | Stop reason: HOSPADM

## 2020-12-09 RX ORDER — SERTRALINE HYDROCHLORIDE 50 MG/1
50 TABLET, FILM COATED ORAL NIGHTLY
COMMUNITY
End: 2022-01-01 | Stop reason: SDUPTHER

## 2020-12-09 RX ORDER — GLUCAGON 1 MG
1 KIT INJECTION
Status: DISCONTINUED | OUTPATIENT
Start: 2020-12-09 | End: 2020-12-15 | Stop reason: HOSPADM

## 2020-12-09 RX ORDER — CHOLESTYRAMINE 4 G/4.8G
4 POWDER, FOR SUSPENSION ORAL 3 TIMES DAILY
Status: DISCONTINUED | OUTPATIENT
Start: 2020-12-09 | End: 2020-12-11

## 2020-12-09 RX ORDER — IODIXANOL 320 MG/ML
100 INJECTION, SOLUTION INTRAVASCULAR
Status: COMPLETED | OUTPATIENT
Start: 2020-12-09 | End: 2020-12-09

## 2020-12-09 RX ORDER — SODIUM CHLORIDE 9 MG/ML
INJECTION, SOLUTION INTRAVENOUS ONCE
Status: CANCELLED | OUTPATIENT
Start: 2020-12-09 | End: 2020-12-09

## 2020-12-09 RX ORDER — MAGNESIUM SULFATE HEPTAHYDRATE 40 MG/ML
2 INJECTION, SOLUTION INTRAVENOUS
Status: DISCONTINUED | OUTPATIENT
Start: 2020-12-09 | End: 2020-12-15 | Stop reason: HOSPADM

## 2020-12-09 RX ORDER — MEROPENEM AND SODIUM CHLORIDE 1 G/50ML
1 INJECTION, SOLUTION INTRAVENOUS
Status: DISCONTINUED | OUTPATIENT
Start: 2020-12-10 | End: 2020-12-09

## 2020-12-09 RX ORDER — IBUPROFEN 200 MG
16 TABLET ORAL
Status: DISCONTINUED | OUTPATIENT
Start: 2020-12-09 | End: 2020-12-15 | Stop reason: HOSPADM

## 2020-12-09 RX ORDER — FLUCONAZOLE 100 MG/1
100 TABLET ORAL DAILY
Status: DISCONTINUED | OUTPATIENT
Start: 2020-12-09 | End: 2020-12-15 | Stop reason: HOSPADM

## 2020-12-09 RX ORDER — MAGNESIUM SULFATE HEPTAHYDRATE 40 MG/ML
4 INJECTION, SOLUTION INTRAVENOUS
Status: DISCONTINUED | OUTPATIENT
Start: 2020-12-09 | End: 2020-12-15 | Stop reason: HOSPADM

## 2020-12-09 RX ORDER — TALC
6 POWDER (GRAM) TOPICAL NIGHTLY PRN
Status: DISCONTINUED | OUTPATIENT
Start: 2020-12-09 | End: 2020-12-15 | Stop reason: HOSPADM

## 2020-12-09 RX ORDER — IBUPROFEN 200 MG
24 TABLET ORAL
Status: DISCONTINUED | OUTPATIENT
Start: 2020-12-09 | End: 2020-12-15 | Stop reason: HOSPADM

## 2020-12-09 RX ORDER — ACETAMINOPHEN 325 MG/1
650 TABLET ORAL EVERY 8 HOURS PRN
Status: DISCONTINUED | OUTPATIENT
Start: 2020-12-09 | End: 2020-12-15 | Stop reason: HOSPADM

## 2020-12-09 RX ORDER — LEVOTHYROXINE SODIUM 88 UG/1
88 TABLET ORAL
Status: DISCONTINUED | OUTPATIENT
Start: 2020-12-09 | End: 2020-12-15 | Stop reason: HOSPADM

## 2020-12-09 RX ORDER — MUPIROCIN 20 MG/G
OINTMENT TOPICAL 2 TIMES DAILY
Status: COMPLETED | OUTPATIENT
Start: 2020-12-09 | End: 2020-12-14

## 2020-12-09 RX ORDER — HYDROCODONE BITARTRATE AND ACETAMINOPHEN 10; 325 MG/1; MG/1
1 TABLET ORAL EVERY 6 HOURS PRN
Status: DISCONTINUED | OUTPATIENT
Start: 2020-12-09 | End: 2020-12-15 | Stop reason: HOSPADM

## 2020-12-09 RX ORDER — FERROUS GLUCONATE 324(38)MG
324 TABLET ORAL
Status: ON HOLD | COMMUNITY
End: 2021-03-17 | Stop reason: HOSPADM

## 2020-12-09 RX ORDER — LIDOCAINE HYDROCHLORIDE 20 MG/ML
JELLY TOPICAL
Status: DISCONTINUED | OUTPATIENT
Start: 2020-12-09 | End: 2020-12-15 | Stop reason: HOSPADM

## 2020-12-09 RX ORDER — MAGNESIUM SULFATE 1 G/100ML
1 INJECTION INTRAVENOUS
Status: DISCONTINUED | OUTPATIENT
Start: 2020-12-09 | End: 2020-12-15 | Stop reason: HOSPADM

## 2020-12-09 RX ORDER — MEROPENEM AND SODIUM CHLORIDE 500 MG/50ML
500 INJECTION, SOLUTION INTRAVENOUS
Status: DISCONTINUED | OUTPATIENT
Start: 2020-12-09 | End: 2020-12-09

## 2020-12-09 RX ORDER — ONDANSETRON 2 MG/ML
4 INJECTION INTRAMUSCULAR; INTRAVENOUS EVERY 8 HOURS PRN
Status: DISCONTINUED | OUTPATIENT
Start: 2020-12-09 | End: 2020-12-15 | Stop reason: HOSPADM

## 2020-12-09 RX ORDER — CHOLESTYRAMINE 4 G/4.8G
4 POWDER, FOR SUSPENSION ORAL 2 TIMES DAILY
Status: DISCONTINUED | OUTPATIENT
Start: 2020-12-09 | End: 2020-12-09

## 2020-12-09 RX ORDER — SODIUM BICARBONATE 650 MG/1
650 TABLET ORAL 2 TIMES DAILY
Status: DISCONTINUED | OUTPATIENT
Start: 2020-12-09 | End: 2020-12-15 | Stop reason: HOSPADM

## 2020-12-09 RX ADMIN — IODIXANOL 100 ML: 320 INJECTION, SOLUTION INTRAVASCULAR at 01:12

## 2020-12-09 RX ADMIN — DILTIAZEM HYDROCHLORIDE 120 MG: 120 CAPSULE, COATED, EXTENDED RELEASE ORAL at 09:12

## 2020-12-09 RX ADMIN — LIDOCAINE HYDROCHLORIDE: 20 JELLY TOPICAL at 09:12

## 2020-12-09 RX ADMIN — HYDROCODONE BITARTRATE AND ACETAMINOPHEN 1 TABLET: 10; 325 TABLET ORAL at 09:12

## 2020-12-09 RX ADMIN — CHOLESTYRAMINE 4 G: 4 POWDER, FOR SUSPENSION ORAL at 09:12

## 2020-12-09 RX ADMIN — ONDANSETRON 4 MG: 2 INJECTION INTRAMUSCULAR; INTRAVENOUS at 05:12

## 2020-12-09 RX ADMIN — MAGNESIUM SULFATE 1 G: 1 INJECTION INTRAVENOUS at 12:12

## 2020-12-09 RX ADMIN — CHOLESTYRAMINE 4 G: 4 POWDER, FOR SUSPENSION ORAL at 10:12

## 2020-12-09 RX ADMIN — MUPIROCIN: 20 OINTMENT TOPICAL at 09:12

## 2020-12-09 RX ADMIN — FLUCONAZOLE 100 MG: 100 TABLET ORAL at 09:12

## 2020-12-09 RX ADMIN — HYDROCODONE BITARTRATE AND ACETAMINOPHEN 1 TABLET: 10; 325 TABLET ORAL at 03:12

## 2020-12-09 RX ADMIN — CEFTRIAXONE SODIUM 1 G: 1 INJECTION, POWDER, FOR SOLUTION INTRAMUSCULAR; INTRAVENOUS at 10:12

## 2020-12-09 RX ADMIN — SODIUM BICARBONATE 650 MG TABLET 650 MG: at 09:12

## 2020-12-09 RX ADMIN — CHOLESTYRAMINE 4 G: 4 POWDER, FOR SUSPENSION ORAL at 03:12

## 2020-12-09 RX ADMIN — LEVOTHYROXINE SODIUM 88 MCG: 88 TABLET ORAL at 05:12

## 2020-12-09 RX ADMIN — HYDROCODONE BITARTRATE AND ACETAMINOPHEN 1 TABLET: 10; 325 TABLET ORAL at 02:12

## 2020-12-09 NOTE — HPI
History of Present Illness / Brief Hospital Course:  Shara Hutchins is a 70 y.o. female who  has a past medical history of Chronic kidney disease, stage III (moderate) (1/7/2018), Diabetes mellitus, type 2, Fatigue, Hemodialysis patient, Hypertension, Iron deficiency anemia due to chronic blood loss (1/7/2018), and Vulvar cancer (1/7/2019).. The patient presented to the on 12/8/2020 with a primary complaint of BUTTOCK AND VAGINAL PAIN (S/P RADIATION), COLD S/S, Chills, and Cough     She has chronic radiation changes to her vulva and perineum which are intermittently bothersome for her. She has chronic diarrhea and is often incontinent of urine and stool.   Currently, she says she has had worsening pain and erythema, which are now severe, to the area over the past couple of days and thinks she is developing an infection. She has had some chills but denies fevers.   She also c/o of a dry cough for the past few days.   Denies chest pain, sick contacts, diaphoresis, lightheadedness, syncope.   She was started on meropenem and vancomycin in the ED.

## 2020-12-09 NOTE — SUBJECTIVE & OBJECTIVE
Interval History: Ms figueroa has chronic diarrhea which agrivates her perianal rash. The pain is 10/10    Review of Systems   Constitutional: Positive for diaphoresis and fatigue. Negative for chills and fever.   HENT: Negative.    Eyes: Negative.    Respiratory: Negative.    Cardiovascular: Negative.    Gastrointestinal: Positive for abdominal distention, diarrhea and nausea. Negative for vomiting.        Perianal pain   Endocrine: Positive for cold intolerance and heat intolerance.   Genitourinary: Negative.    Musculoskeletal: Positive for arthralgias and myalgias.   Skin:        Perianal rash   Allergic/Immunologic: Negative.    Neurological: Positive for weakness.   Hematological: Bruises/bleeds easily.   Psychiatric/Behavioral: Positive for decreased concentration, dysphoric mood and sleep disturbance. The patient is nervous/anxious.      Objective:     Vital Signs (Most Recent):  Temp: 97.9 °F (36.6 °C) (12/09/20 1621)  Pulse: (!) 54 (12/09/20 1621)  Resp: 18 (12/09/20 1621)  BP: 121/60 (12/09/20 1621)  SpO2: 97 % (12/09/20 1621) Vital Signs (24h Range):  Temp:  [97.9 °F (36.6 °C)-99 °F (37.2 °C)] 97.9 °F (36.6 °C)  Pulse:  [54-85] 54  Resp:  [13-23] 18  SpO2:  [94 %-100 %] 97 %  BP: (107-160)/(60-72) 121/60     Weight: 60.4 kg (133 lb 2.5 oz)  Body mass index is 20.86 kg/m².    Intake/Output Summary (Last 24 hours) at 12/9/2020 1723  Last data filed at 12/9/2020 0500  Gross per 24 hour   Intake 540 ml   Output --   Net 540 ml      Physical Exam  Vitals signs and nursing note reviewed.   Constitutional:       General: She is not in acute distress.     Appearance: She is ill-appearing and diaphoretic.      Comments: 6/10 painful faces   HENT:      Head: Normocephalic and atraumatic.      Nose: Nose normal.      Mouth/Throat:      Mouth: Mucous membranes are moist.   Eyes:      Pupils: Pupils are equal, round, and reactive to light.   Neck:      Musculoskeletal: Normal range of motion.   Cardiovascular:      Rate  and Rhythm: Normal rate and regular rhythm.      Heart sounds: Gallop present.    Pulmonary:      Effort: Pulmonary effort is normal.      Breath sounds: Rales present.      Comments: bibasilar  Abdominal:      General: There is distension.      Tenderness: There is no abdominal tenderness. There is no guarding or rebound.   Genitourinary:     Comments: Perirectal edema and erythema  Musculoskeletal: Normal range of motion.   Skin:     General: Skin is warm.   Neurological:      General: No focal deficit present.      Mental Status: She is alert and oriented to person, place, and time.   Psychiatric:      Comments: Depressed mood         Significant Labs:   Recent Lab Results       12/09/20  0618   12/09/20  0617   12/08/20  2101 12/08/20 2053 12/08/20 2024        Benzodiazepines       Negative       Phencyclidine       Negative       Procalcitonin               Albumin               Alkaline Phosphatase               Allens Test         Pass     ALT               Amphetamine Screen, Ur       Negative       Anion Gap   8           Appearance, UA       Cloudy       aPTT   32.8  Comment:  Therapeutic Range of 67.5-105.1 secs.  Equivalent to Heparin Concentration of anti-Xa 0.3-0.7 IU/ml.             AST               Bacteria, UA       Many       Barbiturate Screen, Ur       Negative       Baso # 0.02             Basophil % 0.3             Bilirubin (UA)       Negative       BILIRUBIN TOTAL               Blood Culture, Routine               BNP               Site         RR     BUN   24           Calcium   8.6           Chloride   105           CO2   25           Cocaine (Metab.)       Negative       Color, UA       Orange       CPK               CPK MB               Creatinine   2.5           Creatinine, Urine       38.0  Comment:  The random urine reference ranges provided were established   for 24 hour urine collections.  No reference ranges exist for  random urine specimens.  Correlate clinically.          CRP               DelSys         Room Air     Differential Method Automated             eGFR if    21.8           eGFR if non    18.9  Comment:  Calculation used to obtain the estimated glomerular filtration  rate (eGFR) is the CKD-EPI equation.              Eos # 0.1             Eosinophil % 1.3             Ferritin               Free T4               Glucose   82           Glucose, UA       Negative       Gran # (ANC) 6.0             Gran % 78.0             Group & Rh               Hematocrit 36.2             Hemoglobin 11.1             Hyaline Casts, UA       28       Immature Grans (Abs) 0.03  Comment:  Mild elevation in immature granulocytes is non specific and   can be seen in a variety of conditions including stress response,   acute inflammation, trauma and pregnancy. Correlation with other   laboratory and clinical findings is essential.               Immature Granulocytes 0.4             INDIRECT JAYSON               INR   1.6  Comment:  Coumadin Therapy:  INR: 2.0-3.0 conventional anticoagulation  INR: 2.5-3.5 intensive anticoagulation             Ketones, UA       Negative       LD               Leukocytes, UA       3+       Lymph # 0.9             Lymph % 11.4             Magnesium   1.7           MCH 33.3             MCHC 30.7                          Microscopic Comment       SEE COMMENT  Comment:  Other formed elements not mentioned in the report are not   present in the microscopic examination.          Mode         SPONT     Mono # 0.7             Mono % 8.6             MPV 10.1             NITRITE UA       Negative       nRBC 0             Occult Blood UA       3+       Opiate Scrn, Ur       Negative       pH, UA       7.0       Phosphorus               Platelets 153             POC BE         -3     POC HCO3         22.5     POC PCO2         39.0     POC PH         7.370     POC PO2         85     POC SATURATED O2         96     POC TCO2         24      Potassium   3.5           PROTEIN TOTAL               Protein, UA       2+  Comment:  Recommend a 24 hour urine protein or a urine   protein/creatinine ratio if globulin induced proteinuria is  clinically suspected.         PT   18.6           RBC 3.33             RBC, UA       >100       RDW 14.3             Sample         ARTERIAL     SARS-CoV-2 RNA, Amplification, Qual     Negative  Comment:  This test utilizes isothermal nucleic acid amplification   technology to detect the SARS-CoV-2 RdRp nucleic acid segment.   The analytical sensitivity (limit of detection) is 125 genome   equivalents/mL.   A POSITIVE result implies infection with the SARS-CoV-2 virus;  the patient is presumed to be contagious.    A NEGATIVE result means that SARS-CoV-2 nucleic acids are not  present above the limit of detection. A NEGATIVE result should be   treated as presumptive. It does not rule out the possibility of   COVID-19 and should not be the sole basis for treatment decisions.   If COVID-19 is strongly suspected based on clinical and exposure   history, re-testing using an alternate molecular assay should be   considered.   This test is only for use under the Food and Drug   Administration s Emergency Use Authorization (EUA).   Commercial kits are provided by [employer].   Performance characteristics of the EUA have been independently  verified by [facility] Department of  Pathology and Laboratory Medicine.   _________________________________________________________________  The ID NOW COVID-19 Letter of Authorization, along with the   authorized Fact Sheet for Healthcare Providers, the authorized Fact  Sheet for Patients, and authorized labeling are available on the FDA   website:  www.fda.gov/MedicalDevices/Safety/EmergencySituations/zlb416671.htm           Sodium   138           Sp02         98     Specific [place], UA       1.010       Specimen UA       Urine, Clean Catch       Squam Epithel, UA       3        Marijuana (THC) Metabolite       Negative       Toxicology Information       SEE COMMENT  Comment:  This screen includes the following classes of drugs at the   listed cut-off:  Benzodiazepines                  200 ng/ml  Cocaine metabolite               300 ng/ml  Opiates                          300 ng/ml  Barbiturates                     200 ng/ml  Amphetamines                    1000 ng/ml  Marijuana metabs (THC)            50 ng/ml  Phencyclidine (PCP)               25 ng/ml  High concentrations of Methylenedioxymethamphetamine (MDMA aka  Ectasy) and other structurally similar compounds may cross-   react with the Amphetamine/Methamphetamine screening   immunoassay giving a false positive result.  Note: This exception list includes only more common   interferants in toxicology screen testing.  Because of many   cross-reactantspositive results on toxicology drug screens   should be confirmed whenever results do not correlate with   clinical presentation.  This report is intended for use in clinical monitoring and  management of patients. It is not intended for use in   employment related drug testing.  Because of any cross-reactants, positive results on toxicology  drug screens should be confirmed whenever results do not  correlate with clinical presentation.  Presumptive positive results are unconfirmed and may be used   only for medical purposes.         Troponin I               TSH               UROBILINOGEN UA       Negative       WBC, UA       >100       WBC 7.70                              12/08/20  2006   12/08/20  1833   12/08/20  1830        Benzodiazepines           Phencyclidine           Procalcitonin   0.30  Comment:  Procalcitonin Result Interpretation:  <=0.50 ng/mL  Systemic infection (sepsis) is not likely. Local bacterial infection   is   possible.  >0.50 and <= 2.00 ng/mL  Systemic infection (sepsis) is possible, but other conditions are   also known   to elevate procalcitonin.   >2.00  ng/mL  Systemic infection (sepsis) is likely unless other causes are known.  >=10.00 ng/mL  Important systemic inflammatory response, almost exclusively due to   severe   bacterial sepsis or septic shock.         Albumin   3.9       Alkaline Phosphatase   135       Allens Test           ALT   18       Amphetamine Screen, Ur           Anion Gap   10       Appearance, UA           aPTT           AST   18       Bacteria, UA           Barbiturate Screen, Ur           Baso #   0.02       Basophil %   0.2       Bilirubin (UA)           BILIRUBIN TOTAL   0.9  Comment:  For infants and newborns, interpretation of results should be based  on gestational age, weight and in agreement with clinical  observations.  Premature Infant recommended reference ranges:  Up to 24 hours.............<8.0 mg/dL  Up to 48 hours............<12.0 mg/dL  3-5 days..................<15.0 mg/dL  6-29 days.................<15.0 mg/dL         Blood Culture, Routine No Growth to date[P]   No Growth to date[P]     BNP   81  Comment:  Values of less than 100 pg/ml are consistent with non-CHF populations.       Site           BUN   24       Calcium   8.8       Chloride   104       CO2   24       Cocaine (Metab.)           Color, UA           CPK   55       CPK MB   1.7       Creatinine   2.4       Creatinine, Urine           CRP   0.46       DelSys           Differential Method   Automated       eGFR if    22.9       eGFR if non    19.9  Comment:  Calculation used to obtain the estimated glomerular filtration  rate (eGFR) is the CKD-EPI equation.          Eos #   0.1       Eosinophil %   1.2       Ferritin   378       Free T4   0.82       Glucose   86       Glucose, UA           Gran # (ANC)   6.0       Gran %   72.8       Group & Rh     A POS     Hematocrit   39.6       Hemoglobin   12.1       Hyaline Casts, UA           Immature Grans (Abs)   0.04  Comment:  Mild elevation in immature granulocytes is non specific and    can be seen in a variety of conditions including stress response,   acute inflammation, trauma and pregnancy. Correlation with other   laboratory and clinical findings is essential.         Immature Granulocytes   0.5       INDIRECT JAYSON     NEG     INR   1.8  Comment:  Coumadin Therapy:  INR: 2.0-3.0 conventional anticoagulation  INR: 2.5-3.5 intensive anticoagulation         Ketones, UA           LD   163  Comment:  Results are increased in hemolyzed samples.       Leukocytes, UA           Lymph #   1.5       Lymph %   17.7       Magnesium   1.5       MCH   33.2       MCHC   30.6       MCV   109       Microscopic Comment           Mode           Mono #   0.6       Mono %   7.6       MPV   9.7       NITRITE UA           nRBC   0       Occult Blood UA           Opiate Scrn, Ur           pH, UA           Phosphorus   3.5       Platelets   187       POC BE           POC HCO3           POC PCO2           POC PH           POC PO2           POC SATURATED O2           POC TCO2           Potassium   3.3       PROTEIN TOTAL   6.4       Protein, UA           PT   19.9       RBC   3.64       RBC, UA           RDW   14.5       Sample           SARS-CoV-2 RNA, Amplification, Qual           Sodium   138       Sp02           Specific Gravity, UA           Specimen UA           Squam Epithel, UA           Marijuana (THC) Metabolite           Toxicology Information           Troponin I   <0.030       TSH   8.660       UROBILINOGEN UA           WBC, UA           WBC   8.20             Significant Imaging: I have reviewed all pertinent imaging results/findings within the past 24 hours.

## 2020-12-09 NOTE — CONSULTS
Consult Note  Infectious Disease    Reason for Consult:  Perineal cellulitis    HPI: Shara Hutchins is a   70 y.o. female with whom I am familiar, last seen August of 2019, has chronic diarrhea from short gut syndrome and prior episodes of perineal cellulitis who began to feel swelling in the vulva and in her buttocks over the last 2-3 days with worsening induration.  She had a low-grade temperature and malaise.  She came to the emergency room yesterday and was diagnosed with cellulitis of the perineum, pneumonia and urinary tract infection.  She said some postnasal drip and nighttime cough without purulent sputum or purulent nasal discharge.  She has no pleurisy or mitis is.  His very difficult for her to discern dysuria because her perineum is so irritated but she believes she may have some dysuria.  She has up to 12 bowel movements today as every time she voids she has a liquid stool.  She is on cholestyramine twice daily but this does not control her symptoms.  She has been treated with gattex in the past but it stopped working.  She was placed empirically on vancomycin and meropenem and admitted to the Hospital Medicine Service.    Review of patient's allergies indicates:   Allergen Reactions    Ciprofloxacin Other (See Comments)     Elevated liver enzymes      Pcn [penicillins] Anaphylaxis     TOLERATES ROCEPHIN WITHOUT DIFFICULTY     Past Medical History:   Diagnosis Date    Chronic diarrhea 2/10/2020    Chronic kidney disease, stage III (moderate) 1/7/2018    Diabetes mellitus, type 2     pt has not had for years after losing weight    Fatigue     Hemodialysis patient     Hypertension     Hypothyroidism 2/10/2020    Iron deficiency anemia due to chronic blood loss 1/7/2018    Pulmonary hypertension 3/20/2020    Vulvar cancer 1/7/2019     Past Surgical History:   Procedure Laterality Date    ABDOMINAL SURGERY      APPENDECTOMY      AV FISTULA PLACEMENT Left 2018    BACK SURGERY      BLADDER  FULGURATION N/A 6/16/2020    Procedure: FULGURATION, BLADDER;  Surgeon: Damari Barber Jr., MD;  Location: [facility];  Service: Urology;  Laterality: N/A;    carpel tunnel surgery once on each hand      CERVICAL FUSION      x 4    cervix repair      CHOLECYSTECTOMY  2000    COLECTOMY      CYSTOSCOPY W/ RETROGRADES  6/16/2020    Procedure: CYSTOSCOPY, WITH RETROGRADE PYELOGRAM;  Surgeon: Damari Barber Jr., MD;  Location: [facility] OR;  Service: Urology;;    DIAGNOSTIC LAPAROSCOPY N/A 2/13/2020    Procedure: LAPAROSCOPY, DIAGNOSTIC;  Surgeon: Robbi Lovell III, MD;  Location: [facility] OR;  Service: General;  Laterality: N/A;    HYSTERECTOMY  1980    ILEOSTOMY N/A 2/13/2020    Procedure: CREATION, ILEOSTOMY Loop;  Surgeon: Robbi Lovell III, MD;  Location: [facility];  Service: General;  Laterality: N/A;    ILEOSTOMY CLOSURE N/A 5/18/2020    Procedure: CLOSURE, ILEOSTOMY;  Surgeon: Robbi Lovell III, MD;  Location: [facility] OR;  Service: General;  Laterality: N/A;    LUMBAR LAMINECTOMY      LYSIS OF ADHESIONS N/A 2/13/2020    Procedure: LYSIS, ADHESIONS;  Surgeon: Robbi Lovell III, MD;  Location: [facility];  Service: General;  Laterality: N/A;    NECK SURGERY      PHLEBOGRAPHY  2/28/2020    Procedure: VENOGRAM;  Surgeon: Robbi Lovell III, MD;  Location: [facility];  Service: General;;    REMOVAL OF VASCULAR ACCESS PORT Right 2/13/2020    Procedure: REMOVAL, VASCULAR ACCESS PORT;  Surgeon: Robbi Lovell III, MD;  Location: [facility];  Service: General;  Laterality: Right;    SHOULDER SURGERY      vulva cancer       Social History     Socioeconomic History    Marital status:      Spouse name: Not on file    Number of children: Not on file    Years of education: Not on file    Highest education level: Not on file   Occupational History    Not on file   Social Needs    Financial resource strain: Not on file    Food insecurity     Worry: Not on file     Inability: Not on file     Transportation needs     Medical: Not on file     Non-medical: Not on file   Tobacco Use    Smoking status: Former Smoker     Packs/day: 1.00     Years: 33.00     Pack years: 33.00     Types: Cigarettes     Quit date: 2000     Years since quittin.3    Smokeless tobacco: Former User     Quit date:    Substance and Sexual Activity    Alcohol use: No    Drug use: No    Sexual activity: Not on file   Lifestyle    Physical activity     Days per week: Not on file     Minutes per session: Not on file    Stress: Not on file   Relationships    Social connections     Talks on phone: Not on file     Gets together: Not on file     Attends [employer] service: Not on file     Active member of club or organization: Not on file     Attends meetings of clubs or organizations: Not on file     Relationship status: Not on file   Other Topics Concern    Not on file   Social History Narrative    Not on file     Family History   Problem Relation Age of Onset    Heart disease Mother     Hypertension Mother     Cancer Mother         lung    Heart disease Father     Heart disease Sister         Open heart surgery    No Known Problems Daughter        Pertinent medications noted:     Review of Systems:   Mild chills, fever, no sweats, substantial weight loss over the last 2 years  No change in vision,   Mild sinus congestion, no purulent nasal discharge, bedtime post nasal drip, no facial pain  No pain in mouth or throat. No problems with teeth, gums.  No chest pain, palpitations, syncope  Minimal cough, sputum production, shortness of breath, without pleurisy, hemoptysis  No nausea, vomiting, but with chronic diarrhea, no blood in stool, or focal abd pain,  No dysphagia, odynophagia  Possible dysuria, no hematuria,    Vulvar, perineal and inner buttock redness, edema and induration  No swelling of joints, redness of joints, injuries, or new focal pain  No unusual headaches,   No anxiety, depression, substance  abuse, sleep disturbance  Diabetes  No bleeding, lymphadenopathy,   malignancy, unusual bruising  No new rashes,         EXAM & DIAGNOSTICS REVIEWED:   Vitals:     Temp:  [97.9 °F (36.6 °C)-99 °F (37.2 °C)]   Temp: 98.1 °F (36.7 °C) (12/09/20 1159)  Pulse: 66 (12/09/20 1159)  Resp: 17 (12/09/20 1159)  BP: 111/62 (12/09/20 1159)  SpO2: 96 % (12/09/20 1159)    Intake/Output Summary (Last 24 hours) at 12/9/2020 1531  Last data filed at 12/9/2020 0500  Gross per 24 hour   Intake 540 ml   Output --   Net 540 ml       General:  In NAD. Alert and attentive, cooperative, comfortable, non toxic  Eyes:  Anicteric, PERRL, EOMI  ENT:  No ulcers, exudates, thrush, nares patent,   Neck:  supple, no masses or adenopathy appreciated  Lungs: Very faint crackles R base, could be atelectasis  Heart:  RRR, no gallop/murmur/rub noted  Abd:  Soft, NT, ND, normal BS, no masses or organomegaly appreciated.  :  Voids, edematous and erythematous vulva, perineum, and inner buttocks. The tissues are so swollen as to make discerning the anus difficult. No palpable abscess. No ischemia, necrosis, crepitance, or signs of necrotizing infection. Understandably tender. No vesicles  Musc:  Joints without effusion, swelling, erythema, synovitis,but she has had substantial muscle wasting.   Skin:   . No palmar or plantar lesions. No subungual petechiae  Wound:   Neuro:  Alert, attentive, speech fluent, face symmetric, moves all extremities, no focal weakness. Ambulatory  Psych:  Calm, cooperative  Lymphatic:     No cervical, supraclavicular, axillary, nodes  Extrem: No edema, erythema, phlebitis, cellulitis, warm and well perfused  VAD:   Peripheral. Left arm AVF function    Isolation:  Contact for history of VRE/MRSA    Lines/Tubes/Drains:    General Labs reviewed:  Recent Labs   Lab 12/08/20  1833 12/09/20  0618   WBC 8.20 7.70   HGB 12.1 11.1*   HCT 39.6 36.2*    153       Recent Labs   Lab 12/08/20  1833 12/09/20  0617    138   K  3.3* 3.5    105   CO2 24 25   BUN 24* 24*   CREATININE 2.4* 2.5*   CALCIUM 8.8 8.6*   PROT 6.4  --    BILITOT 0.9  --    ALKPHOS 135  --    ALT 18  --    AST 18  --            Micro:  Microbiology Results (last 7 days)     Procedure Component Value Units Date/Time    Urine culture [[id number]] Collected: 12/08/20 2053    Order Status: No result Specimen: Urine from Clean Catch Updated: 12/09/20 0903    Culture, Respiratory with Gram Stain [[id number]]     Order Status: Sent Specimen: Respiratory from Sputum, Expectorated     Urine Culture High Risk [[id number]]     Order Status: Completed Specimen: Urine, Clean Catch     Clostridium difficile EIA [[id number]]     Order Status: No result Specimen: Stool     Blood culture x two cultures. Draw prior to antibiotics. [[id number]] Collected: 12/08/20 2006    Order Status: Completed Specimen: Blood from Peripheral, Wrist, Right Updated: 12/09/20 0317     Blood Culture, Routine No Growth to date    Narrative:      Aerobic and anaerobic    Blood culture x two cultures. Draw prior to antibiotics. [[id number]] Collected: 12/08/20 1830    Order Status: Completed Specimen: Blood from Peripheral, Antecubital, Right Updated: 12/09/20 0158     Blood Culture, Routine No Growth to date    Narrative:      Aerobic and anaerobic        Imaging Reviewed:   CXR   CT abd/pelvis  Cardiology:    IMPRESSION & PLAN   1. Perineal cellulitis, more induration, some element of venous congestion or lymphedema   Maceration from chronic diarrhea  2. Abnormal CXr, minimal respiratory symptoms, mostly post nasal drip  3. Abnormal UA,   4. Chronic diarrhea, short gut, inadequately controlled on bid questran  5. ESRD on dialysis      Recommendations:  vanc and rocephin  Add topical lidocaine to calmoseptine or zinc to perineum for comfort  Increase questran to TID(she was unaware that it must be  from other meds)  Add diflucan po  Sputum culture  Add on urine culture to abnormal UA (can't  imagine how she could collect a clean sample but she tried)    Medical Decision Making during this encounter was  [_] Low Complexity  [_] Moderate Complexity  [ x ] High Complexity  x

## 2020-12-09 NOTE — PLAN OF CARE
Medicare Outpatient Observation Notice was signed, explained and given to patient/caregiver on 12/09/2020 at 8:50am     answered all questions

## 2020-12-09 NOTE — PROGRESS NOTES
[facility] Medicine  Progress Note    Patient Name: Shara Figueroa  MRN: [id number]  Patient Class: OP- Observation   Admission Date: 12/8/2020  Length of Stay: 0 days  Attending Physician: Ashwin Diaz MD  Primary Care Provider: April Horton MD        Subjective:     Principal Problem:Perineal rash, female        HPI:  History of Present Illness / Brief Hospital Course:  Shara Figueroa is a 70 y.o. female who  has a past medical history of Chronic kidney disease, stage III (moderate) (1/7/2018), Diabetes mellitus, type 2, Fatigue, Hemodialysis patient, Hypertension, Iron deficiency anemia due to chronic blood loss (1/7/2018), and Vulvar cancer (1/7/2019).. The patient presented to the on 12/8/2020 with a primary complaint of BUTTOCK AND VAGINAL PAIN (S/P RADIATION), COLD S/S, Chills, and Cough     She has chronic radiation changes to her vulva and perineum which are intermittently bothersome for her. She has chronic diarrhea and is often incontinent of urine and stool.   Currently, she says she has had worsening pain and erythema, which are now severe, to the area over the past couple of days and thinks she is developing an infection. She has had some chills but denies fevers.   She also c/o of a dry cough for the past few days.   Denies chest pain, sick contacts, diaphoresis, lightheadedness, syncope.   She was started on meropenem and vancomycin in the ED.     Overview/Hospital Course:  12/9/2020  Pt has a considerable amount of perianal pain. She has a short gut and chronic diarrhea. Her perianal pain was 10/10. She states that she saw Dr Neal this AM.    Interval History: Ms figueroa has chronic diarrhea which agrivates her perianal rash. The pain is 10/10    Review of Systems   Constitutional: Positive for diaphoresis and fatigue. Negative for chills and fever.   HENT: Negative.    Eyes: Negative.    Respiratory: Negative.    Cardiovascular: Negative.    Gastrointestinal: Positive for  abdominal distention, diarrhea and nausea. Negative for vomiting.        Perianal pain   Endocrine: Positive for cold intolerance and heat intolerance.   Genitourinary: Negative.    Musculoskeletal: Positive for arthralgias and myalgias.   Skin:        Perianal rash   Allergic/Immunologic: Negative.    Neurological: Positive for weakness.   Hematological: Bruises/bleeds easily.   Psychiatric/Behavioral: Positive for decreased concentration, dysphoric mood and sleep disturbance. The patient is nervous/anxious.      Objective:     Vital Signs (Most Recent):  Temp: 97.9 °F (36.6 °C) (12/09/20 1621)  Pulse: (!) 54 (12/09/20 1621)  Resp: 18 (12/09/20 1621)  BP: 121/60 (12/09/20 1621)  SpO2: 97 % (12/09/20 1621) Vital Signs (24h Range):  Temp:  [97.9 °F (36.6 °C)-99 °F (37.2 °C)] 97.9 °F (36.6 °C)  Pulse:  [54-85] 54  Resp:  [13-23] 18  SpO2:  [94 %-100 %] 97 %  BP: (107-160)/(60-72) 121/60     Weight: 60.4 kg (133 lb 2.5 oz)  Body mass index is 20.86 kg/m².    Intake/Output Summary (Last 24 hours) at 12/9/2020 1723  Last data filed at 12/9/2020 0500  Gross per 24 hour   Intake 540 ml   Output --   Net 540 ml      Physical Exam  Vitals signs and nursing note reviewed.   Constitutional:       General: She is not in acute distress.     Appearance: She is ill-appearing and diaphoretic.      Comments: 6/10 painful faces   HENT:      Head: Normocephalic and atraumatic.      Nose: Nose normal.      Mouth/Throat:      Mouth: Mucous membranes are moist.   Eyes:      Pupils: Pupils are equal, round, and reactive to light.   Neck:      Musculoskeletal: Normal range of motion.   Cardiovascular:      Rate and Rhythm: Normal rate and regular rhythm.      Heart sounds: Gallop present.    Pulmonary:      Effort: Pulmonary effort is normal.      Breath sounds: Rales present.      Comments: bibasilar  Abdominal:      General: There is distension.      Tenderness: There is no abdominal tenderness. There is no guarding or rebound.    Genitourinary:     Comments: Perirectal edema and erythema  Musculoskeletal: Normal range of motion.   Skin:     General: Skin is warm.   Neurological:      General: No focal deficit present.      Mental Status: She is alert and oriented to person, place, and time.   Psychiatric:      Comments: Depressed mood         Significant Labs:   Recent Lab Results       12/09/20  0618   12/09/20  0617   12/08/20  2101   12/08/20 2053 12/08/20 2024        Benzodiazepines       Negative       Phencyclidine       Negative       Procalcitonin               Albumin               Alkaline Phosphatase               Allens Test         Pass     ALT               Amphetamine Screen, Ur       Negative       Anion Gap   8           Appearance, UA       Cloudy       aPTT   32.8  Comment:  Therapeutic Range of 67.5-105.1 secs.  Equivalent to Heparin Concentration of anti-Xa 0.3-0.7 IU/ml.             AST               Bacteria, UA       Many       Barbiturate Screen, Ur       Negative       Baso # 0.02             Basophil % 0.3             Bilirubin (UA)       Negative       BILIRUBIN TOTAL               Blood Culture, Routine               BNP               Site         RR     BUN   24           Calcium   8.6           Chloride   105           CO2   25           Cocaine (Metab.)       Negative       Color, UA       Orange       CPK               CPK MB               Creatinine   2.5           Creatinine, Urine       38.0  Comment:  The random urine reference ranges provided were established   for 24 hour urine collections.  No reference ranges exist for  random urine specimens.  Correlate clinically.         CRP               DelSys         Room Air     Differential Method Automated             eGFR if    21.8           eGFR if non    18.9  Comment:  Calculation used to obtain the estimated glomerular filtration  rate (eGFR) is the CKD-EPI equation.              Eos # 0.1             Eosinophil %  1.3             Ferritin               Free T4               Glucose   82           Glucose, UA       Negative       Gran # (ANC) 6.0             Gran % 78.0             Group & Rh               Hematocrit 36.2             Hemoglobin 11.1             Hyaline Casts, UA       28       Immature Grans (Abs) 0.03  Comment:  Mild elevation in immature granulocytes is non specific and   can be seen in a variety of conditions including stress response,   acute inflammation, trauma and pregnancy. Correlation with other   laboratory and clinical findings is essential.               Immature Granulocytes 0.4             INDIRECT JAYSON               INR   1.6  Comment:  Coumadin Therapy:  INR: 2.0-3.0 conventional anticoagulation  INR: 2.5-3.5 intensive anticoagulation             Ketones, UA       Negative       LD               Leukocytes, UA       3+       Lymph # 0.9             Lymph % 11.4             Magnesium   1.7           MCH 33.3             MCHC 30.7                          Microscopic Comment       SEE COMMENT  Comment:  Other formed elements not mentioned in the report are not   present in the microscopic examination.          Mode         SPONT     Mono # 0.7             Mono % 8.6             MPV 10.1             NITRITE UA       Negative       nRBC 0             Occult Blood UA       3+       Opiate Scrn, Ur       Negative       pH, UA       7.0       Phosphorus               Platelets 153             POC BE         -3     POC HCO3         22.5     POC PCO2         39.0     POC PH         7.370     POC PO2         85     POC SATURATED O2         96     POC TCO2         24     Potassium   3.5           PROTEIN TOTAL               Protein, UA       2+  Comment:  Recommend a 24 hour urine protein or a urine   protein/creatinine ratio if globulin induced proteinuria is  clinically suspected.         PT   18.6           RBC 3.33             RBC, UA       >100       RDW 14.3             Sample          ARTERIAL     SARS-CoV-2 RNA, Amplification, Qual     Negative  Comment:  This test utilizes isothermal nucleic acid amplification   technology to detect the SARS-CoV-2 RdRp nucleic acid segment.   The analytical sensitivity (limit of detection) is 125 genome   equivalents/mL.   A POSITIVE result implies infection with the SARS-CoV-2 virus;  the patient is presumed to be contagious.    A NEGATIVE result means that SARS-CoV-2 nucleic acids are not  present above the limit of detection. A NEGATIVE result should be   treated as presumptive. It does not rule out the possibility of   COVID-19 and should not be the sole basis for treatment decisions.   If COVID-19 is strongly suspected based on clinical and exposure   history, re-testing using an alternate molecular assay should be   considered.   This test is only for use under the Food and Drug   Administration s Emergency Use Authorization (EUA).   Commercial kits are provided by [employer].   Performance characteristics of the EUA have been independently  verified by [facility] Department of  Pathology and Laboratory Medicine.   _________________________________________________________________  The ID NOW COVID-19 Letter of Authorization, along with the   authorized Fact Sheet for Healthcare Providers, the authorized Fact  Sheet for Patients, and authorized labeling are available on the FDA   website:  www.fda.gov/MedicalDevices/Safety/EmergencySituations/pzt093577.htm           Sodium   138           Sp02         98     Specific Hines, UA       1.010       Specimen UA       Urine, Clean Catch       Squam Epithel, UA       3       Marijuana (THC) Metabolite       Negative       Toxicology Information       SEE COMMENT  Comment:  This screen includes the following classes of drugs at the   listed cut-off:  Benzodiazepines                  200 ng/ml  Cocaine metabolite               300 ng/ml  Opiates                          300 ng/ml  Barbiturates                      200 ng/ml  Amphetamines                    1000 ng/ml  Marijuana metabs (THC)            50 ng/ml  Phencyclidine (PCP)               25 ng/ml  High concentrations of Methylenedioxymethamphetamine (MDMA aka  Ectasy) and other structurally similar compounds may cross-   react with the Amphetamine/Methamphetamine screening   immunoassay giving a false positive result.  Note: This exception list includes only more common   interferants in toxicology screen testing.  Because of many   cross-reactantspositive results on toxicology drug screens   should be confirmed whenever results do not correlate with   clinical presentation.  This report is intended for use in clinical monitoring and  management of patients. It is not intended for use in   employment related drug testing.  Because of any cross-reactants, positive results on toxicology  drug screens should be confirmed whenever results do not  correlate with clinical presentation.  Presumptive positive results are unconfirmed and may be used   only for medical purposes.         Troponin I               TSH               UROBILINOGEN UA       Negative       WBC, UA       >100       WBC 7.70                              12/08/20  2006   12/08/20  1833   12/08/20  1830        Benzodiazepines           Phencyclidine           Procalcitonin   0.30  Comment:  Procalcitonin Result Interpretation:  <=0.50 ng/mL  Systemic infection (sepsis) is not likely. Local bacterial infection   is   possible.  >0.50 and <= 2.00 ng/mL  Systemic infection (sepsis) is possible, but other conditions are   also known   to elevate procalcitonin.   >2.00 ng/mL  Systemic infection (sepsis) is likely unless other causes are known.  >=10.00 ng/mL  Important systemic inflammatory response, almost exclusively due to   severe   bacterial sepsis or septic shock.         Albumin   3.9       Alkaline Phosphatase   135       Allens Test           ALT   18       Amphetamine Screen, Ur            Anion Gap   10       Appearance, UA           aPTT           AST   18       Bacteria, UA           Barbiturate Screen, Ur           Baso #   0.02       Basophil %   0.2       Bilirubin (UA)           BILIRUBIN TOTAL   0.9  Comment:  For infants and newborns, interpretation of results should be based  on gestational age, weight and in agreement with clinical  observations.  Premature Infant recommended reference ranges:  Up to 24 hours.............<8.0 mg/dL  Up to 48 hours............<12.0 mg/dL  3-5 days..................<15.0 mg/dL  6-29 days.................<15.0 mg/dL         Blood Culture, Routine No Growth to date[P]   No Growth to date[P]     BNP   81  Comment:  Values of less than 100 pg/ml are consistent with non-CHF populations.       Site           BUN   24       Calcium   8.8       Chloride   104       CO2   24       Cocaine (Metab.)           Color, UA           CPK   55       CPK MB   1.7       Creatinine   2.4       Creatinine, Urine           CRP   0.46       DelSys           Differential Method   Automated       eGFR if    22.9       eGFR if non    19.9  Comment:  Calculation used to obtain the estimated glomerular filtration  rate (eGFR) is the CKD-EPI equation.          Eos #   0.1       Eosinophil %   1.2       Ferritin   378       Free T4   0.82       Glucose   86       Glucose, UA           Gran # (ANC)   6.0       Gran %   72.8       Group & Rh     A POS     Hematocrit   39.6       Hemoglobin   12.1       Hyaline Casts, UA           Immature Grans (Abs)   0.04  Comment:  Mild elevation in immature granulocytes is non specific and   can be seen in a variety of conditions including stress response,   acute inflammation, trauma and pregnancy. Correlation with other   laboratory and clinical findings is essential.         Immature Granulocytes   0.5       INDIRECT JAYSON     NEG     INR   1.8  Comment:  Coumadin Therapy:  INR: 2.0-3.0 conventional  anticoagulation  INR: 2.5-3.5 intensive anticoagulation         Ketones, UA           LD   163  Comment:  Results are increased in hemolyzed samples.       Leukocytes, UA           Lymph #   1.5       Lymph %   17.7       Magnesium   1.5       MCH   33.2       MCHC   30.6       MCV   109       Microscopic Comment           Mode           Mono #   0.6       Mono %   7.6       MPV   9.7       NITRITE UA           nRBC   0       Occult Blood UA           Opiate Scrn, Ur           pH, UA           Phosphorus   3.5       Platelets   187       POC BE           POC HCO3           POC PCO2           POC PH           POC PO2           POC SATURATED O2           POC TCO2           Potassium   3.3       PROTEIN TOTAL   6.4       Protein, UA           PT   19.9       RBC   3.64       RBC, UA           RDW   14.5       Sample           SARS-CoV-2 RNA, Amplification, Qual           Sodium   138       Sp02           Specific Gravity, UA           Specimen UA           Squam Epithel, UA           Marijuana (THC) Metabolite           Toxicology Information           Troponin I   <0.030       TSH   8.660       UROBILINOGEN UA           WBC, UA           WBC   8.20             Significant Imaging: I have reviewed all pertinent imaging results/findings within the past 24 hours.      Assessment/Plan:      12/9/2020  A)  Perianal edema with erythema and irritation in a patient with short gut syndrome and chronic diarrhea  ESRD on hemodialysis  HTN  Hx of Vulvar cancer  P)  Rocephin 1 gram Q 24  Diflucan orally  Hydrocodone 10 po Q 6 PRN pain  ID consult appreciated  Nephrology consulted for dialysis  Increase cholestyramine to TID    No notes have been filed under this hospital service.  Service: Hospital Medicine    VTE Risk Mitigation (From admission, onward)         Ordered     IP VTE HIGH RISK PATIENT  Once      12/09/20 0227     Place sequential compression device  Until discontinued      12/09/20 0227     Place JA hose  Until  discontinued      12/09/20 0227                Discharge Planning   RODRIGO:      Code Status: Full Code   Is the patient medically ready for discharge?:     Reason for patient still in hospital (select all that apply): Patient new problem, Treatment and Consult recommendations  Discharge Plan A: Home with family                  Ashwin Diaz MD  Department of Hospital Medicine   [facility]

## 2020-12-09 NOTE — PROGRESS NOTES
VANCOMYCIN PHARMACOKINETIC NOTE:  Vancomycin Day # 1    Objective/Assessment:    Diagnosis/Indication for Vancomycin: Skin & Soft Tissue infection     70 y.o., female; Actual Body Weight = 58.5 kg (129 lb).    The patient has the following labs:  12/9/2020 Estimated Creatinine Clearance: 20.1 mL/min (A) (based on SCr of 2.4 mg/dL (H)). Lab Results   Component Value Date    BUN 24 (H) 12/08/2020     Lab Results   Component Value Date    WBC 8.20 12/08/2020          Plan:  Give first dose of IV vancomycin (1000 mg IV).        Will follow random vancomycin levels and redose when serum vancomycin level decreases to 10-15 mcg/mL    Random vancomycin level has been ordered approximately 23 hours following dose #1.    Thank you for allowing us to participate in this patient's care.     Raúl Hill 12/9/2020 2:38 AM  Department of Pharmacy  Ext [phone]

## 2020-12-09 NOTE — HOSPITAL COURSE
12/9/2020  Pt has a considerable amount of perianal pain. She has a short gut and chronic diarrhea. Her perianal pain was 10/10. She states that she saw Dr Neal this AM.    12/10/2020  Ms Hutchins is feeling better with less perianal pain.    12/11/2020  Pt still has some abdominal discomfort 4-6/10 with less diarrhea. Overall she is feeling better in the anal region.    12/12/2020  Pt has a sore throat and would like chloraseptic spray. My bottom still hurt but is better and the diarrhea is the same.    12/13/2020  Pt did not tolerate linazolide due to lower extremity swelling after the dose. She would like to restart her Zoloft     12/14/2020  Ms Hutchins notes that she is volume overloaded for IV medication. He perianal pain has improved.    12/15/2020  Dr Neal feels that I can go home today. My bottom hurts much less and my diarrhea has slowed down. Dr Neal gave me antibiotic prescriptions last night. I will go to dialysis in the AM and I will call them now.  ROS: no fever or chills, HEENT no complaint my mouth is not Dry, No complaint: neck, pulmonary heart abdomen  EXT or neuro Perianal pain 4/10 or less, diarrheic stools reduced  PE: in no distress HEENT [place] EOM intact moist mucus membranes Neck supple no use of accessory muscles Heart S $ S 1 S 2 No S3 RRR, Abdomen BS+ nontender Ext without CC or E pulses 1-2+. Skin perianal area with decreased edema Neuro no overt sensory or motor deficit

## 2020-12-09 NOTE — ED PROVIDER NOTES
Encounter Date: 12/8/2020       History     Chief Complaint   Patient presents with    BUTTOCK AND VAGINAL PAIN     S/P RADIATION    COLD S/S    Chills    Cough     This is a 70-year-old female who presents with several complaints.  The patient reports that over the past 3-4 days she has had coughing that has progressively worsened with associated shortness of breath with coughing and somewhat with exertion.  She denies any associated chest pain.  She has had sinus congestion and postnasal drip.  She denies any loss of sense of taste or smell.  She is also complaining of progressively worsening erythema and pain to her genitourinary area.  The patient underwent radiation therapy years ago for cervical or vulvar cancer.  She states that the skin remained thickened and rather irritated after that time and has been prone to infection and cellulitis secondary to the chronic changes associated with radiation therapy.  She states that over the past week or so she has developed progressively worsening redness irritation pain to the area over the past week.  She denies trauma.  She has had nausea without vomiting.  She denies any headache neck pain or stiffness.  Symptoms have been moderate in intensity with no exacerbating or alleviating factors.  She denies any other problems or complaints.    The history is provided by the patient.     Review of patient's allergies indicates:   Allergen Reactions    Ciprofloxacin Other (See Comments)     Elevated liver enzymes      Pcn [penicillins] Anaphylaxis     TOLERATES ROCEPHIN WITHOUT DIFFICULTY     Past Medical History:   Diagnosis Date    Chronic kidney disease, stage III (moderate) 1/7/2018    Diabetes mellitus, type 2     Fatigue     Hemodialysis patient     Hypertension     Iron deficiency anemia due to chronic blood loss 1/7/2018    Vulvar cancer 1/7/2019     Past Surgical History:   Procedure Laterality Date    ABDOMINAL SURGERY      AV FISTULA PLACEMENT Left  2018    BACK SURGERY      BLADDER FULGURATION N/A 6/16/2020    Procedure: FULGURATION, BLADDER;  Surgeon: Damari Barber Jr., MD;  Location: [facility];  Service: Urology;  Laterality: N/A;    carpel tunnel surgery once on each hand      CERVICAL FUSION      x 4    cervix repair      CHOLECYSTECTOMY  2000    CYSTOSCOPY W/ RETROGRADES  6/16/2020    Procedure: CYSTOSCOPY, WITH RETROGRADE PYELOGRAM;  Surgeon: Damari Barber Jr., MD;  Location: [facility];  Service: Urology;;    DIAGNOSTIC LAPAROSCOPY N/A 2/13/2020    Procedure: LAPAROSCOPY, DIAGNOSTIC;  Surgeon: Robbi Lovell III, MD;  Location: [facility];  Service: General;  Laterality: N/A;    HYSTERECTOMY  1980    ILEOSTOMY N/A 2/13/2020    Procedure: CREATION, ILEOSTOMY Loop;  Surgeon: Robbi Lovell III, MD;  Location: [facility];  Service: General;  Laterality: N/A;    ILEOSTOMY CLOSURE N/A 5/18/2020    Procedure: CLOSURE, ILEOSTOMY;  Surgeon: Robbi Lovell III, MD;  Location: [facility];  Service: General;  Laterality: N/A;    LUMBAR LAMINECTOMY      LYSIS OF ADHESIONS N/A 2/13/2020    Procedure: LYSIS, ADHESIONS;  Surgeon: Robbi Lovell III, MD;  Location: [facility];  Service: General;  Laterality: N/A;    NECK SURGERY      PHLEBOGRAPHY  2/28/2020    Procedure: VENOGRAM;  Surgeon: Robbi Lovell III, MD;  Location: [facility];  Service: General;;    REMOVAL OF VASCULAR ACCESS PORT Right 2/13/2020    Procedure: REMOVAL, VASCULAR ACCESS PORT;  Surgeon: Robbi Lovell III, MD;  Location: [facility];  Service: General;  Laterality: Right;    SHOULDER SURGERY      vulva cancer       Family History   Problem Relation Age of Onset    Heart disease Mother     Hypertension Mother     Cancer Mother         lung    Heart disease Father     Heart disease Sister         Open heart surgery    No Known Problems Daughter      Social History     Tobacco Use    Smoking status: Former Smoker     Packs/day: 1.00     Years: 33.00     Pack years: 33.00      Types: Cigarettes     Quit date: 2000     Years since quittin.3    Smokeless tobacco: Former User     Quit date:    Substance Use Topics    Alcohol use: No    Drug use: No     Review of Systems   Constitutional: Positive for chills, fatigue and fever. Negative for activity change and appetite change.   HENT: Negative.  Negative for congestion, dental problem, ear pain, rhinorrhea, sinus pressure, sinus pain, sore throat and trouble swallowing.    Eyes: Negative.  Negative for photophobia, pain, redness and visual disturbance.   Respiratory: Positive for cough and shortness of breath. Negative for chest tightness and wheezing.    Cardiovascular: Negative.  Negative for chest pain, palpitations and leg swelling.   Gastrointestinal: Negative.  Negative for abdominal distention, abdominal pain, anal bleeding, blood in stool, constipation, diarrhea, nausea and vomiting.   Endocrine: Negative.    Genitourinary: Positive for vaginal pain. Negative for decreased urine volume, difficulty urinating, dyspareunia, dysuria, enuresis, flank pain, frequency, hematuria, pelvic pain and urgency.   Musculoskeletal: Negative.  Negative for arthralgias, back pain, gait problem, joint swelling, myalgias, neck pain and neck stiffness.   Skin: Negative.  Negative for color change, pallor and rash.   Neurological: Negative.  Negative for dizziness, tremors, seizures, syncope, facial asymmetry, speech difficulty, weakness, light-headedness, numbness and headaches.   Hematological: Negative.  Does not bruise/bleed easily.   Psychiatric/Behavioral: Negative.  Negative for confusion.   All other systems reviewed and are negative.      Physical Exam     Initial Vitals [20 1746]   BP Pulse Resp Temp SpO2   (!) 160/72 83 18 98.9 °F (37.2 °C) 97 %      MAP       --         Physical Exam    Nursing note and vitals reviewed.  Constitutional: She appears well-developed and well-nourished. She is not diaphoretic. She is  active and cooperative.  Non-toxic appearance. She does not have a sickly appearance. She does not appear ill. No distress.   HENT:   Head: Normocephalic and atraumatic.   Right Ear: Tympanic membrane normal.   Left Ear: Tympanic membrane normal.   Nose: Nose normal.   Mouth/Throat: Uvula is midline, oropharynx is clear and moist and mucous membranes are normal. No oral lesions. No uvula swelling. No oropharyngeal exudate, posterior oropharyngeal edema or posterior oropharyngeal erythema.   Eyes: Conjunctivae, EOM and lids are normal. Pupils are equal, round, and reactive to light. No scleral icterus.   Neck: Trachea normal, normal range of motion, full passive range of motion without pain and phonation normal. Neck supple. No thyroid mass present. No stridor present. No spinous process tenderness and no muscular tenderness present. No edema, no erythema and normal range of motion present. No neck rigidity. No JVD present.   Cardiovascular: Normal rate, regular rhythm, normal heart sounds, intact distal pulses and normal pulses. Exam reveals no gallop and no friction rub.    No murmur heard.  Pulmonary/Chest: Effort normal and breath sounds normal. No accessory muscle usage. No tachypnea. No respiratory distress. She has no wheezes. She has no rhonchi. She has no rales.   Abdominal: Soft. Normal appearance and bowel sounds are normal. She exhibits no distension, no pulsatile midline mass and no mass. There is no abdominal tenderness. There is no rigidity, no guarding and no CVA tenderness.   Genitourinary: There is tenderness on the right labia.    Genitourinary Comments: Vulvar area and perineal/perirectal area with skin erythema edema thickening and increased warmth.  No palpable abscess.  Area is diffusely tender but there is no pain out of proportion to exam.  There is no eschar blackened areas.  There are no bulla.      Musculoskeletal: Normal range of motion. No tenderness or edema.      Comments: Pulses are  2+ throughout, cap refill is less than 2 sec throughout, extremities are nontender throughout with full range of motion. There is no spinal tenderness to palpation.   Neurological: She is alert and oriented to person, place, and time. She has normal strength. She displays normal reflexes. No cranial nerve deficit or sensory deficit.   No focal deficits.   Skin: Skin is warm, dry and intact. Capillary refill takes less than 2 seconds. No ecchymosis, no petechiae and no rash noted. No erythema. No pallor.   Psychiatric: She has a normal mood and affect. Her speech is normal and behavior is normal. Judgment and thought content normal. Cognition and memory are normal.         ED Course   Procedures  Labs Reviewed   CBC W/ AUTO DIFFERENTIAL - Abnormal; Notable for the following components:       Result Value    RBC 3.64 (*)      (*)     MCH 33.2 (*)     MCHC 30.6 (*)     Lymph % 17.7 (*)     All other components within normal limits   COMPREHENSIVE METABOLIC PANEL - Abnormal; Notable for the following components:    Potassium 3.3 (*)     BUN 24 (*)     Creatinine 2.4 (*)     eGFR if  22.9 (*)     eGFR if non  19.9 (*)     All other components within normal limits   FERRITIN - Abnormal; Notable for the following components:    Ferritin 378 (*)     All other components within normal limits   MAGNESIUM - Abnormal; Notable for the following components:    Magnesium 1.5 (*)     All other components within normal limits   URINALYSIS - Abnormal; Notable for the following components:    Color, UA Orange (*)     Appearance, UA Cloudy (*)     Protein, UA 2+ (*)     Occult Blood UA 3+ (*)     Leukocytes, UA 3+ (*)     All other components within normal limits   TSH - Abnormal; Notable for the following components:    TSH 8.660 (*)     All other components within normal limits   PROTIME-INR - Abnormal; Notable for the following components:    PT 19.9 (*)     All other components within normal  limits   URINALYSIS MICROSCOPIC - Abnormal; Notable for the following components:    RBC, UA >100 (*)     WBC, UA >100 (*)     Bacteria Many (*)     Hyaline Casts, UA 28 (*)     All other components within normal limits   ISTAT PROCEDURE - Abnormal; Notable for the following components:    POC HCO3 22.5 (*)     All other components within normal limits   CULTURE, BLOOD   CULTURE, BLOOD   C-REACTIVE PROTEIN   LACTATE DEHYDROGENASE   CK   TROPONIN I   SARS-COV-2 RNA AMPLIFICATION, QUAL   PROCALCITONIN   B-TYPE NATRIURETIC PEPTIDE   CK-MB   PHOSPHORUS   TSH   B-TYPE NATRIURETIC PEPTIDE   PROTIME-INR   CK-MB   PHOSPHORUS   DRUG SCREEN PANEL, URINE EMERGENCY   T4, FREE   DRUG SCREEN PANEL, URINE EMERGENCY   LACTIC ACID, PLASMA   TYPE & SCREEN          Imaging Results          X-Ray Chest PA And Lateral (Final result)  Result time 12/08/20 18:12:02   Procedure changed from X-Ray Chest AP Portable     Final result by Rashi Wilder MD (12/08/20 18:12:02)                 Narrative:    CLINICAL HISTORY:  70 years (1950) Female Suspected Covid-19 Virus Infection BUTTOCK  AND VAGINAL PAIN?(S/P RADIATION); COLD S/S?; Chills?; Cough?    TECHNIQUE:  PA and lateral radiograph of the chest.    COMPARISON:  Radiograph from August 21, 2020.    FINDINGS:  Faint interstitial opacity at the right lung base (best appreciated on  the lateral radiograph in the right middle lobe, and posterior segment  of the right lower lobe). Costophrenic angles are seen without  effusion. No pneumothorax is identified. The heart is normal in size.  Atheromatous calcifications are seen at the aortic arch. Osseous  structures appear unchanged. There are clips in the right upper  abdominal quadrant consistent with prior biliary surgery.    IMPRESSION:  Very faint interstitial opacity at the right lung base suggesting very  mild atypical infection/pneumonia or aspiration given the  clinical  history.                  .                          .    Electronically Signed by PADMA Santa on 12/8/2020 6:21 PM                               Medical Decision Making:   Clinical Tests:   Lab Tests: Reviewed  Radiological Study: Reviewed  Medical Tests: Reviewed  ED Management:  The patient presents with 2 complaints coughing and upper respiratory symptoms and has findings of possible pneumonia.  She is not a respiratory distress and has not been noted to be hypoxic.  She has cellulitis and skin excoriation to the vulvovaginal and perianal area which she has had in the past secondary to chronic skin changes secondary to previous radiation.  She has no active cancer.  She does have a history of dialysis and is dialyzed Mondays Wednesdays and Fridays and was dialyzed yesterday.  Blood cultures have been obtained.  IV antibiotics have been given.  She has been hemodynamically stable and has not exhibiting any evidence of sepsis shock or hypoperfusion.  Neurological exam is normal.  Will discuss with hospitalist for admission.                             Clinical Impression:       ICD-10-CM ICD-9-CM   1. Pneumonia due to infectious organism, unspecified laterality, unspecified part of lung  J18.9 486   2. Cellulitis of perineum  L03.315 682.2                          ED Disposition Condition    Admit                             Luzma Chaparro MD  12/08/20 [id number]       Luzma Chaparro MD  12/09/20 0000

## 2020-12-09 NOTE — CONSULTS
Redness, firm tissue labia, and buttock folds, and around rectum.  Patient states she uses different barrier creams, pastes and ointment.  Recommend Triad once or twice a day, thin layer.

## 2020-12-09 NOTE — PLAN OF CARE
Pt lives with daughter and will have no discharge needs at this time   Pcp= ryan Horton rx= Suresh HUNTER     12/09/20 1518   Discharge Assessment   Assessment Type Discharge Planning Assessment   Confirmed/corrected address and phone number on facesheet? Yes   Assessment information obtained from? Patient;Medical Record   Communicated expected length of stay with patient/caregiver no   Prior to hospitilization cognitive status: Alert/Oriented   Prior to hospitalization functional status: Independent;Assistive Equipment   Current cognitive status: Alert/Oriented   Current Functional Status: Independent;Assistive Equipment   Lives With child(elisha), adult   Able to Return to Prior Arrangements yes   Is patient able to care for self after discharge? Yes   Who are your caregiver(s) and their phone number(s)? Julieta Chang [phone]   Patient's perception of discharge disposition home or selfcare   Readmission Within the Last 30 Days no previous admission in last 30 days   Patient currently being followed by outpatient case management? No   Patient currently receives any other outside agency services? No   Equipment Currently Used at Home none   Do you have any problems affording any of your prescribed medications? No   Is the patient taking medications as prescribed? yes   Does the patient have transportation home? Yes   Transportation Anticipated family or friend will provide   Dialysis Name and Scheduled days [facility]   Does the patient receive services at the [facility]? No   Discharge Plan A Home with family   Discharge Plan B Home with family   DME Needed Upon Discharge  none   Patient/Family in Agreement with Plan unable to assess

## 2020-12-09 NOTE — H&P
Hospital Medicine H&P    Date of Admit: 12/8/2020  Date of Transfer: 12/9/2020    Subjective:      History of Present Illness / Brief Hospital Course:  Shara Hutchins is a 70 y.o. female who  has a past medical history of Chronic kidney disease, stage III (moderate) (1/7/2018), Diabetes mellitus, type 2, Fatigue, Hemodialysis patient, Hypertension, Iron deficiency anemia due to chronic blood loss (1/7/2018), and Vulvar cancer (1/7/2019).. The patient presented to the on 12/8/2020 with a primary complaint of BUTTOCK AND VAGINAL PAIN (S/P RADIATION), COLD S/S, Chills, and Cough    She has chronic radiation changes to her vulva and perineum which are intermittently bothersome for her. She has chronic diarrhea and is often incontinent of urine and stool.   Currently, she says she has had worsening pain and erythema, which are now severe, to the area over the past couple of days and thinks she is developing an infection. She has had some chills but denies fevers.   She also c/o of a dry cough for the past few days.   Denies chest pain, sick contacts, diaphoresis, lightheadedness, syncope.   She was started on meropenem and vancomycin in the ED.     Past Medical History:  Past Medical History:   Diagnosis Date    Chronic kidney disease, stage III (moderate) 1/7/2018    Diabetes mellitus, type 2     pt has not had for years after losing weight    Fatigue     Hemodialysis patient     Hypertension     Iron deficiency anemia due to chronic blood loss 1/7/2018    Vulvar cancer 1/7/2019       Past Surgical History:  Past Surgical History:   Procedure Laterality Date    ABDOMINAL SURGERY      APPENDECTOMY      AV FISTULA PLACEMENT Left 2018    BACK SURGERY      BLADDER FULGURATION N/A 6/16/2020    Procedure: FULGURATION, BLADDER;  Surgeon: Damari Barber Jr., MD;  Location: [facility];  Service: Urology;  Laterality: N/A;    carpel tunnel surgery once on each hand      CERVICAL FUSION      x 4    cervix repair       CHOLECYSTECTOMY  2000    COLECTOMY      CYSTOSCOPY W/ RETROGRADES  6/16/2020    Procedure: CYSTOSCOPY, WITH RETROGRADE PYELOGRAM;  Surgeon: Damari Barber Jr., MD;  Location: [facility];  Service: Urology;;    DIAGNOSTIC LAPAROSCOPY N/A 2/13/2020    Procedure: LAPAROSCOPY, DIAGNOSTIC;  Surgeon: Robbi Lovell III, MD;  Location: [facility] OR;  Service: General;  Laterality: N/A;    HYSTERECTOMY  1980    ILEOSTOMY N/A 2/13/2020    Procedure: CREATION, ILEOSTOMY Loop;  Surgeon: Robbi Lovell III, MD;  Location: [facility] OR;  Service: General;  Laterality: N/A;    ILEOSTOMY CLOSURE N/A 5/18/2020    Procedure: CLOSURE, ILEOSTOMY;  Surgeon: Robbi Lovell III, MD;  Location: [facility];  Service: General;  Laterality: N/A;    LUMBAR LAMINECTOMY      LYSIS OF ADHESIONS N/A 2/13/2020    Procedure: LYSIS, ADHESIONS;  Surgeon: Robbi Lovell III, MD;  Location: [facility] OR;  Service: General;  Laterality: N/A;    NECK SURGERY      PHLEBOGRAPHY  2/28/2020    Procedure: VENOGRAM;  Surgeon: Robbi Lovell III, MD;  Location: [facility] OR;  Service: General;;    REMOVAL OF VASCULAR ACCESS PORT Right 2/13/2020    Procedure: REMOVAL, VASCULAR ACCESS PORT;  Surgeon: Robbi Lovell III, MD;  Location: [facility];  Service: General;  Laterality: Right;    SHOULDER SURGERY      vulva cancer         Allergies:  Review of patient's allergies indicates:   Allergen Reactions    Ciprofloxacin Other (See Comments)     Elevated liver enzymes      Pcn [penicillins] Anaphylaxis     TOLERATES ROCEPHIN WITHOUT DIFFICULTY       Home Medications:  Prior to Admission medications    Medication Sig Start Date End Date Taking? Authorizing Provider   cholestyramine-aspartame (QUESTRAN LIGHT) 4 gram PwPk Take 1 packet (4 g total) by mouth 2 (two) times daily. for 21 days 5/22/20 6/29/29 Yes William Ambrocio MD   diltiaZEM (CARDIZEM CD) 120 MG Cp24 Take 120 mg by mouth once daily.   Yes Historical Provider   ferrous gluconate (FERGON) 324 MG  tablet Take 324 mg by mouth daily with breakfast.   Yes Historical Provider   levothyroxine (SYNTHROID) 88 MCG tablet Take 1 tablet (88 mcg total) by mouth before breakfast. 3/7/20 3/7/21 Yes Clifford Allen MD   sertraline (ZOLOFT) 50 MG tablet Take 50 mg by mouth every evening.   Yes Historical Provider   sodium bicarbonate 325 MG tablet Take 2 tablets (650 mg total) by mouth 2 (two) times daily. 7/30/20  Yes April Horton MD   traMADoL (ULTRAM) 50 mg tablet Take 1 tablet (50 mg total) by mouth every 4 (four) hours as needed. for pain. 12/2/20  Yes April Horton MD   ferrous sulfate 324 mg (65 mg iron) TbEC Take 325 mg by mouth once daily.  12/9/20 Yes Historical Provider   calcitRIOL (ROCALTROL) 0.5 MCG Cap Take 0.5 mcg by mouth once daily.  12/9/20  Historical Provider   magnesium oxide (MAG-OX) 400 mg (241.3 mg magnesium) tablet Take 800 mg by mouth once daily.  12/9/20  Historical Provider   megestroL (MEGACE) 400 mg/10 mL (40 mg/mL) Susp 10cc daily-400 mg daily 7/30/20 12/9/20  April Horton MD   mirtazapine (REMERON) 30 MG tablet Take 1 tablet (30 mg total) by mouth every evening. 7/14/20 12/9/20  April Horton MD   promethazine (PHENERGAN) 25 MG tablet Take 1 tablet (25 mg total) by mouth 2 (two) times a day. 12/2/20 12/9/20  April Horton MD   sertraline (ZOLOFT) 50 MG tablet Take 1 tablet by mouth once daily. 8/19/20 12/9/20  Historical Provider   sodium polystyrene (KAYEXALATE) powder YOU MAY DILUTE THIS IN WATER-TK 1 DOSE 8/6/20 12/9/20  Historical Provider       Family History:  Family History   Problem Relation Age of Onset    Heart disease Mother     Hypertension Mother     Cancer Mother         lung    Heart disease Father     Heart disease Sister         Open heart surgery    No Known Problems Daughter        Social History:  Social History     Tobacco Use    Smoking status: Former Smoker     Packs/day: 1.00     Years: 33.00     Pack years: 33.00     Types: Cigarettes     Quit  "date: 2000     Years since quittin.3    Smokeless tobacco: Former User     Quit date:    Substance Use Topics    Alcohol use: No    Drug use: No       Review of Systems:  Pertinent items are noted in HPI. All other systems are reviewed and are negative.     Objective:   Last 24 Hour Vital Signs:  BP  Min: 107/60  Max: 160/72  Temp  Av.8 °F (37.1 °C)  Min: 98.5 °F (36.9 °C)  Max: 99 °F (37.2 °C)  Pulse  Av.8  Min: 68  Max: 85  Resp  Av.3  Min: 13  Max: 23  SpO2  Av.4 %  Min: 94 %  Max: 100 %  Height  Av' 7.3" (170.9 cm)  Min: 5' 7" (170.2 cm)  Max: 5' 7.5" (171.5 cm)  Weight  Av.5 kg (131 lb 1.3 oz)  Min: 58.5 kg (129 lb)  Max: 60.4 kg (133 lb 2.5 oz)  Body mass index is 20.86 kg/m².  No intake/output data recorded.    Physical Examination:  Physical Exam  Vitals signs reviewed. Exam conducted with a chaperone present.   HENT:      Head: Normocephalic and atraumatic.      Mouth/Throat:      Mouth: Mucous membranes are moist.      Pharynx: Oropharynx is clear.   Eyes:      General: No scleral icterus.     Pupils: Pupils are equal, round, and reactive to light.   Cardiovascular:      Rate and Rhythm: Normal rate and regular rhythm.   Pulmonary:      Effort: Pulmonary effort is normal. No respiratory distress.   Abdominal:      General: There is no distension.      Tenderness: There is no abdominal tenderness. There is no guarding.   Genitourinary:     Comments: Erythema, tenderness, to the labia b/l, the perineum and to perianal area   No crepitus   Skin:     General: Skin is warm and dry.      Capillary Refill: Capillary refill takes less than 2 seconds.   Neurological:      General: No focal deficit present.      Mental Status: She is alert and oriented to person, place, and time.           Laboratory:  Most Recent Data:  CBC:   Lab Results   Component Value Date    WBC 8.20 2020    HGB 12.1 2020    HCT 39.6 2020     2020     (H) " 12/08/2020    RDW 14.5 12/08/2020       BMP:   Lab Results   Component Value Date     12/08/2020    K 3.3 (L) 12/08/2020     12/08/2020    CO2 24 12/08/2020    BUN 24 (H) 12/08/2020    GLU 86 12/08/2020    CALCIUM 8.8 12/08/2020    MG 1.5 (L) 12/08/2020    PHOS 3.5 12/08/2020     LFTs:   Lab Results   Component Value Date    PROT 6.4 12/08/2020    ALBUMIN 3.9 12/08/2020    BILITOT 0.9 12/08/2020    AST 18 12/08/2020    ALKPHOS 135 12/08/2020    ALT 18 12/08/2020     Coags:   Lab Results   Component Value Date    INR 1.8 12/08/2020     FLP:   Lab Results   Component Value Date    CHOL 159 03/28/2018    HDL 59 03/28/2018    LDLCALC 73 03/28/2018    TRIG 105 03/23/2020     DM:   Lab Results   Component Value Date    HGBA1C SEE COMMENT 01/22/2020    HGBA1C Invalid (A) 11/15/2019    HGBA1C 5.1 07/11/2019    LDLCALC 73 03/28/2018    CREATININE 2.4 (H) 12/08/2020     Thyroid:   Lab Results   Component Value Date    TSH 8.660 (H) 12/08/2020    FREET4 0.82 12/08/2020    P4JOGTY 7.3 02/24/2020    H0OXYTG 44 (L) 02/24/2020     Anemia:   Lab Results   Component Value Date    IRON 87 08/28/2020    IRON 87 08/28/2020    TIBC 269 08/28/2020    FERRITIN 378 (H) 12/08/2020    LDKPIJSA29 480 02/12/2020    FOLATE 12.5 (H) 01/09/2019     Cardiac:   Lab Results   Component Value Date    TROPONINI <0.030 12/08/2020    BNP 81 12/08/2020     Urinalysis:   Lab Results   Component Value Date    LABURIN No growth 06/16/2020    COLORU [place] (A) 12/08/2020    SPECGRAV 1.010 12/08/2020    NITRITE Negative 12/08/2020    KETONESU Negative 12/08/2020    UROBILINOGEN Negative 12/08/2020    WBCUA >100 (H) 12/08/2020       Trended Lab Data:  Recent Labs   Lab 12/08/20  1833   WBC 8.20   HGB 12.1   HCT 39.6      *   RDW 14.5      K 3.3*      CO2 24   BUN 24*   GLU 86   CALCIUM 8.8   PROT 6.4   ALBUMIN 3.9   BILITOT 0.9   AST 18   ALKPHOS 135   ALT 18       Trended Cardiac Data:  Recent Labs   Lab 12/08/20  1833    TROPONINI <0.030   BNP 81       Other Results:  EKG (my interpretation): normal sinus rhythm, nonspecific ST-T changes     Radiology:  Imaging Results          CT Pelvis With Contrast (Final result)  Result time 12/09/20 00:15:00    Final result by Clifford Hernandez MD (12/09/20 00:15:00)                 Narrative:    EXAM DESCRIPTION: CT PELVIS WITH  CONTRAST 12/9/2020 1:43 AM CST    CLINICAL HISTORY: 70 years, Female, Soft tissue infection suspected, pelvis, initial exam    COMPARISON: None.    PROCEDURE:    Multiple transaxial tomograms of the pelvis were obtained utilizing 2 mm slice thickness at 2 mm interval reconstruction after the administration of 100 mL of Visipaque 320 at a rate of 3 mL/s. 2-D multiplanar reformats and the coronal and sagittal plane were also obtained.  An individualized dose optimization technique, Automated Exposure Control, was utilized for the performed procedure.    The visualized portions of the inferior aspect of the kidneys demonstrate the presence of bilateral nephrolithiasis.  Atherosclerotic disease of the aorta and iliac arteries.  Prior right lower quadrant bowel surgery with anastomosis at the terminal ileum area. No significant abnormal fluid collections within the pelvis. The urinary bladder demonstrate to be within normal limits. The uterus is absent. There are no adnexal masses. There is no retroperitoneal lymphadenopathy. There is no evidence for ascites. The bone windows demonstrate no significant skeletal lesions. Haziness within the skin/subcutaneous tissue could suggest the possibility of edema. There is no evidence for significant inguinal lymphadenopathy. The bone windows demonstrate mild degenerative changes at L4/L5 with posterior facet hypertrophy.    IMPRESSION:    ATHEROSCLEROTIC DISEASE OF THE AORTA AND ILIAC ARTERIES.  BILATERAL NEPHROLITHIASIS.  PRIOR RIGHT LOWER QUADRANT SMALL BOWEL SURGERY WITH REANASTOMOSIS.  STATUS POST HYSTERECTOMY.  HAZINESS WITHIN  THE SKIN/SUBCUTANEOUS TISSUE AND PELVIC PERITONEAL FAT SUGGESTING POSSIBILITY OF EDEMA/ANASARCA.  NO DEFINITIVE ACUTE INTRA-ABDOMINAL PROCESS.    Electronically signed by:  Clifford Hernandez MD  12/9/2020 1:48 AM [facility] [place] Workstation: [id number]                             X-Ray Chest PA And Lateral (Final result)  Result time 12/08/20 18:12:02   Procedure changed from X-Ray Chest AP Portable     Final result by Rashi Wilder MD (12/08/20 18:12:02)                 Narrative:    CLINICAL HISTORY:  70 years (1950) Female Suspected Covid-19 Virus Infection BUTTOCK  AND VAGINAL PAIN?(S/P RADIATION); COLD S/S?; Chills?; Cough?    TECHNIQUE:  PA and lateral radiograph of the chest.    COMPARISON:  Radiograph from August 21, 2020.    FINDINGS:  Faint interstitial opacity at the right lung base (best appreciated on  the lateral radiograph in the right middle lobe, and posterior segment  of the right lower lobe). Costophrenic angles are seen without  effusion. No pneumothorax is identified. The heart is normal in size.  Atheromatous calcifications are seen at the aortic arch. Osseous  structures appear unchanged. There are clips in the right upper  abdominal quadrant consistent with prior biliary surgery.    IMPRESSION:  Very faint interstitial opacity at the right lung base suggesting very  mild atypical infection/pneumonia or aspiration given the clinical  history.                  .                          .    Electronically Signed by PADMA Santa on 12/8/2020 6:21 PM                                 Assessment/Plan:     Shara Hutchins is a 70 y.o. 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       - check CT of the pelvis to r/o abscess, fistula, or other deep-seated infectious source  - cont broad spectrum abx, f/u culture data  - consult wound care   - consult ID  - cont most home meds   - consult Dr. Dietz for HD      Porfirio Hurst

## 2020-12-09 NOTE — CONSULTS
Consult Note  Nephrology    Consult Requested By: Ashwin Diaz MD    Reason for Consult: ESRD    SUBJECTIVE:     History of Present Illness:  71 y/o female patient known to our practice, has HD 3x wk on MWF.  Admitted w/UTI, cellulitis.  Renal is consulted for dialysis orders.    Assessment/plan:    ESRD  Mild hypokalemia  DM 2  HTN  Anemia of chronic dz  SHPT    --continue HD on pt's schedule, MWF  --correct K+ w/HD  --Blood glucose control per primary team.  --VSS  --no need for epo, Hgb 11  --PO4 at goal, not on binders      Past Medical History:   Diagnosis Date    Chronic kidney disease, stage III (moderate) 1/7/2018    Diabetes mellitus, type 2     pt has not had for years after losing weight    Fatigue     Hemodialysis patient     Hypertension     Iron deficiency anemia due to chronic blood loss 1/7/2018    Vulvar cancer 1/7/2019     Past Surgical History:   Procedure Laterality Date    ABDOMINAL SURGERY      APPENDECTOMY      AV FISTULA PLACEMENT Left 2018    BACK SURGERY      BLADDER FULGURATION N/A 6/16/2020    Procedure: FULGURATION, BLADDER;  Surgeon: Damari Barber Jr., MD;  Location: [facility];  Service: Urology;  Laterality: N/A;    carpel tunnel surgery once on each hand      CERVICAL FUSION      x 4    cervix repair      CHOLECYSTECTOMY  2000    COLECTOMY      CYSTOSCOPY W/ RETROGRADES  6/16/2020    Procedure: CYSTOSCOPY, WITH RETROGRADE PYELOGRAM;  Surgeon: Damari Barber Jr., MD;  Location: [facility] OR;  Service: Urology;;    DIAGNOSTIC LAPAROSCOPY N/A 2/13/2020    Procedure: LAPAROSCOPY, DIAGNOSTIC;  Surgeon: Robbi Lovell III, MD;  Location: [facility] OR;  Service: General;  Laterality: N/A;    HYSTERECTOMY  1980    ILEOSTOMY N/A 2/13/2020    Procedure: CREATION, ILEOSTOMY Loop;  Surgeon: Robbi Lovell III, MD;  Location: [facility] OR;  Service: General;  Laterality: N/A;    ILEOSTOMY CLOSURE N/A 5/18/2020    Procedure: CLOSURE, ILEOSTOMY;  Surgeon: Robbi Lovell III,  MD;  Location: [facility] OR;  Service: General;  Laterality: N/A;    LUMBAR LAMINECTOMY      LYSIS OF ADHESIONS N/A 2020    Procedure: LYSIS, ADHESIONS;  Surgeon: Robbi Lovell III, MD;  Location: [facility] OR;  Service: General;  Laterality: N/A;    NECK SURGERY      PHLEBOGRAPHY  2020    Procedure: VENOGRAM;  Surgeon: Robbi Lovell III, MD;  Location: [facility] OR;  Service: General;;    REMOVAL OF VASCULAR ACCESS PORT Right 2020    Procedure: REMOVAL, VASCULAR ACCESS PORT;  Surgeon: Robbi Lovell III, MD;  Location: [facility] OR;  Service: General;  Laterality: Right;    SHOULDER SURGERY      vulva cancer       Family History   Problem Relation Age of Onset    Heart disease Mother     Hypertension Mother     Cancer Mother         lung    Heart disease Father     Heart disease Sister         Open heart surgery    No Known Problems Daughter      Social History     Tobacco Use    Smoking status: Former Smoker     Packs/day: 1.00     Years: 33.00     Pack years: 33.00     Types: Cigarettes     Quit date: 2000     Years since quittin.3    Smokeless tobacco: Former User     Quit date:    Substance Use Topics    Alcohol use: No    Drug use: No       Review of patient's allergies indicates:   Allergen Reactions    Ciprofloxacin Other (See Comments)     Elevated liver enzymes      Pcn [penicillins] Anaphylaxis     TOLERATES ROCEPHIN WITHOUT DIFFICULTY        Review of Systems:  General ROS: negative for - fever or night sweats  Psychological ROS: negative for - behavioral disorder or depression  ENT ROS: negative for - headaches or visual changes  Hematological and Lymphatic ROS: negative for - bleeding problems or bruising  Endocrine ROS: negative for - temperature intolerance or unexpected weight changes  Respiratory ROS: no cough, shortness of breath, or wheezing  Cardiovascular ROS: no chest pain, SOB  Gastrointestinal ROS: no abdominal pain, no n/v/c/d  Genito-Urinary ROS:  s/p radiation tx, painful  Musculoskeletal ROS: negative for - joint pain or joint swelling  Neurological ROS: no TIA or stroke symptoms      OBJECTIVE:     Vital Signs Range (Last 24H):  Temp:  [97.9 °F (36.6 °C)-99 °F (37.2 °C)]   Pulse:  [66-85]   Resp:  [13-23]   BP: (107-160)/(60-72)   SpO2:  [94 %-100 %]     Physical Exam:  General- NAD noted  HEENT- WNL  Neck- supple  CV- Regular rate and rhythm  Resp- Lungs CTA Bilaterally, No increased WOB  GI- Non tender/non-distended, BS normoactive x4 quads  Extrem- No cyanosis, clubbing, edema.  Derm- skin w/d  Neuro-  No flap.     Body mass index is 20.86 kg/m².    Laboratory:  CBC:   Recent Labs   Lab 12/09/20  0618   WBC 7.70   RBC 3.33*   HGB 11.1*   HCT 36.2*      *   MCH 33.3*   MCHC 30.7*     CMP:   Recent Labs   Lab 12/08/20  1833 12/09/20  0617   GLU 86 82   CALCIUM 8.8 8.6*   ALBUMIN 3.9  --    PROT 6.4  --     138   K 3.3* 3.5   CO2 24 25    105   BUN 24* 24*   CREATININE 2.4* 2.5*   ALKPHOS 135  --    ALT 18  --    AST 18  --    BILITOT 0.9  --        Diagnostic Results:  Labs: Reviewed      ASSESSMENT/PLAN:     Active Hospital Problems    Diagnosis  POA    *Perineal rash, female [R21]  Yes    Anemia of chronic disease [D63.8]  Yes     Chronic    Chronic diarrhea [K52.9]  Yes     Chronic    Anemia due to stage 5 chronic kidney disease treated with erythropoietin [N18.5, D63.1]  Yes    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         Thank you for allowing us to participate in the care of your patient. We will follow the patient and provide recommendations as needed.      Time spent seeing patient( greater than 1/2 spent in direct contact) :

## 2020-12-09 NOTE — ED NOTES
Patient ambulated to bathroom. Patient is back in bed, hooked up to monitor, with call light and railings up.

## 2020-12-10 PROBLEM — J18.9 PNEUMONIA DUE TO INFECTIOUS ORGANISM: Status: ACTIVE | Noted: 2020-12-10

## 2020-12-10 LAB
ANION GAP SERPL CALC-SCNC: 6 MMOL/L (ref 8–16)
BACTERIA #/AREA URNS HPF: NEGATIVE /HPF
BASOPHILS # BLD AUTO: 0.01 K/UL (ref 0–0.2)
BASOPHILS NFR BLD: 0.2 % (ref 0–1.9)
BILIRUB UR QL STRIP: NEGATIVE
BUN SERPL-MCNC: 13 MG/DL (ref 8–23)
CALCIUM SERPL-MCNC: 8.3 MG/DL (ref 8.7–10.5)
CHLORIDE SERPL-SCNC: 112 MMOL/L (ref 95–110)
CLARITY UR: ABNORMAL
CO2 SERPL-SCNC: 23 MMOL/L (ref 23–29)
COLOR UR: YELLOW
CREAT SERPL-MCNC: 2.1 MG/DL (ref 0.5–1.4)
DIFFERENTIAL METHOD: ABNORMAL
EOSINOPHIL # BLD AUTO: 0.1 K/UL (ref 0–0.5)
EOSINOPHIL NFR BLD: 2.7 % (ref 0–8)
ERYTHROCYTE [DISTWIDTH] IN BLOOD BY AUTOMATED COUNT: 14.6 % (ref 11.5–14.5)
EST. GFR  (AFRICAN AMERICAN): 26.9 ML/MIN/1.73 M^2
EST. GFR  (NON AFRICAN AMERICAN): 23.3 ML/MIN/1.73 M^2
GLUCOSE SERPL-MCNC: 82 MG/DL (ref 70–110)
GLUCOSE UR QL STRIP: NEGATIVE
HCT VFR BLD AUTO: 33.7 % (ref 37–48.5)
HGB BLD-MCNC: 10 G/DL (ref 12–16)
HGB UR QL STRIP: ABNORMAL
HYALINE CASTS #/AREA URNS LPF: 10 /LPF
IMM GRANULOCYTES # BLD AUTO: 0.01 K/UL (ref 0–0.04)
IMM GRANULOCYTES NFR BLD AUTO: 0.2 % (ref 0–0.5)
KETONES UR QL STRIP: NEGATIVE
LEUKOCYTE ESTERASE UR QL STRIP: ABNORMAL
LYMPHOCYTES # BLD AUTO: 0.9 K/UL (ref 1–4.8)
LYMPHOCYTES NFR BLD: 18 % (ref 18–48)
MAGNESIUM SERPL-MCNC: 1.6 MG/DL (ref 1.6–2.6)
MCH RBC QN AUTO: 32.7 PG (ref 27–31)
MCHC RBC AUTO-ENTMCNC: 29.7 G/DL (ref 32–36)
MCV RBC AUTO: 110 FL (ref 82–98)
MICROSCOPIC COMMENT: ABNORMAL
MONOCYTES # BLD AUTO: 0.4 K/UL (ref 0.3–1)
MONOCYTES NFR BLD: 7.7 % (ref 4–15)
NEUTROPHILS # BLD AUTO: 3.7 K/UL (ref 1.8–7.7)
NEUTROPHILS NFR BLD: 71.2 % (ref 38–73)
NITRITE UR QL STRIP: NEGATIVE
NRBC BLD-RTO: 0 /100 WBC
PH UR STRIP: 6 [PH] (ref 5–8)
PLATELET # BLD AUTO: 128 K/UL (ref 150–350)
PMV BLD AUTO: 10.6 FL (ref 9.2–12.9)
POTASSIUM SERPL-SCNC: 4 MMOL/L (ref 3.5–5.1)
PROT UR QL STRIP: ABNORMAL
RBC # BLD AUTO: 3.06 M/UL (ref 4–5.4)
RBC #/AREA URNS HPF: 1 /HPF (ref 0–4)
SODIUM SERPL-SCNC: 141 MMOL/L (ref 136–145)
SP GR UR STRIP: 1.02 (ref 1–1.03)
SQUAMOUS #/AREA URNS HPF: 19 /HPF
URN SPEC COLLECT METH UR: ABNORMAL
UROBILINOGEN UR STRIP-ACNC: NEGATIVE EU/DL
VANCOMYCIN SERPL-MCNC: 7 UG/ML
WBC # BLD AUTO: 5.17 K/UL (ref 3.9–12.7)
WBC #/AREA URNS HPF: >100 /HPF (ref 0–5)

## 2020-12-10 PROCEDURE — 12000002 HC ACUTE/MED SURGE SEMI-PRIVATE ROOM

## 2020-12-10 PROCEDURE — 83735 ASSAY OF MAGNESIUM: CPT

## 2020-12-10 PROCEDURE — 63600175 PHARM REV CODE 636 W HCPCS: Performed by: INTERNAL MEDICINE

## 2020-12-10 PROCEDURE — 85025 COMPLETE CBC W/AUTO DIFF WBC: CPT

## 2020-12-10 PROCEDURE — 80202 ASSAY OF VANCOMYCIN: CPT

## 2020-12-10 PROCEDURE — 63700000 PHARM REV CODE 250 ALT 637 W/O HCPCS: Performed by: INTERNAL MEDICINE

## 2020-12-10 PROCEDURE — 99232 SBSQ HOSP IP/OBS MODERATE 35: CPT | Mod: ,,, | Performed by: INTERNAL MEDICINE

## 2020-12-10 PROCEDURE — 99232 PR SUBSEQUENT HOSPITAL CARE,LEVL II: ICD-10-PCS | Mod: ,,, | Performed by: INTERNAL MEDICINE

## 2020-12-10 PROCEDURE — 25000003 PHARM REV CODE 250: Performed by: INTERNAL MEDICINE

## 2020-12-10 PROCEDURE — 36415 COLL VENOUS BLD VENIPUNCTURE: CPT

## 2020-12-10 PROCEDURE — 80048 BASIC METABOLIC PNL TOTAL CA: CPT

## 2020-12-10 PROCEDURE — 87340 HEPATITIS B SURFACE AG IA: CPT

## 2020-12-10 RX ORDER — DIPHENHYDRAMINE HCL 25 MG
25 CAPSULE ORAL EVERY 6 HOURS PRN
Status: DISCONTINUED | OUTPATIENT
Start: 2020-12-10 | End: 2020-12-15 | Stop reason: HOSPADM

## 2020-12-10 RX ORDER — VANCOMYCIN HCL IN 5 % DEXTROSE 1G/250ML
1000 PLASTIC BAG, INJECTION (ML) INTRAVENOUS ONCE
Status: COMPLETED | OUTPATIENT
Start: 2020-12-10 | End: 2020-12-10

## 2020-12-10 RX ADMIN — DIPHENHYDRAMINE HYDROCHLORIDE 25 MG: 25 CAPSULE ORAL at 12:12

## 2020-12-10 RX ADMIN — HYDROCODONE BITARTRATE AND ACETAMINOPHEN 1 TABLET: 10; 325 TABLET ORAL at 12:12

## 2020-12-10 RX ADMIN — DILTIAZEM HYDROCHLORIDE 120 MG: 120 CAPSULE, COATED, EXTENDED RELEASE ORAL at 10:12

## 2020-12-10 RX ADMIN — CEFTRIAXONE SODIUM 1 G: 1 INJECTION, POWDER, FOR SOLUTION INTRAMUSCULAR; INTRAVENOUS at 10:12

## 2020-12-10 RX ADMIN — CHOLESTYRAMINE 4 G: 4 POWDER, FOR SUSPENSION ORAL at 08:12

## 2020-12-10 RX ADMIN — MUPIROCIN: 20 OINTMENT TOPICAL at 08:12

## 2020-12-10 RX ADMIN — HYDROCODONE BITARTRATE AND ACETAMINOPHEN 1 TABLET: 10; 325 TABLET ORAL at 06:12

## 2020-12-10 RX ADMIN — VANCOMYCIN HYDROCHLORIDE 1000 MG: 1 INJECTION, POWDER, LYOPHILIZED, FOR SOLUTION INTRAVENOUS at 10:12

## 2020-12-10 RX ADMIN — CHOLESTYRAMINE 4 G: 4 POWDER, FOR SUSPENSION ORAL at 03:12

## 2020-12-10 RX ADMIN — MUPIROCIN: 20 OINTMENT TOPICAL at 10:12

## 2020-12-10 RX ADMIN — SODIUM BICARBONATE 650 MG TABLET 650 MG: at 08:12

## 2020-12-10 RX ADMIN — FLUCONAZOLE 100 MG: 100 TABLET ORAL at 10:12

## 2020-12-10 RX ADMIN — DIPHENHYDRAMINE HYDROCHLORIDE 25 MG: 25 CAPSULE ORAL at 07:12

## 2020-12-10 RX ADMIN — CHOLESTYRAMINE 4 G: 4 POWDER, FOR SUSPENSION ORAL at 10:12

## 2020-12-10 RX ADMIN — SODIUM BICARBONATE 650 MG TABLET 650 MG: at 10:12

## 2020-12-10 RX ADMIN — DIPHENHYDRAMINE HYDROCHLORIDE 25 MG: 25 CAPSULE ORAL at 10:12

## 2020-12-10 RX ADMIN — LEVOTHYROXINE SODIUM 88 MCG: 88 TABLET ORAL at 06:12

## 2020-12-10 NOTE — NURSING
HD tx complete, tolerated fair  Pt c/o nausea and goal was decreased  Net UF 800mL  Denies complaints currently

## 2020-12-10 NOTE — PROGRESS NOTES
[facility] Medicine  Progress Note    Patient Name: Shara Hutchins  MRN: [id number]  Patient Class: OP- Observation   Admission Date: 12/8/2020  Length of Stay: 0 days  Attending Physician: Ashwin Diaz MD  Primary Care Provider: April Horton MD        Subjective:     Principal Problem:Perineal rash, female        HPI:  History of Present Illness / Brief Hospital Course:  Shara Hutchins is a 70 y.o. female who  has a past medical history of Chronic kidney disease, stage III (moderate) (1/7/2018), Diabetes mellitus, type 2, Fatigue, Hemodialysis patient, Hypertension, Iron deficiency anemia due to chronic blood loss (1/7/2018), and Vulvar cancer (1/7/2019).. The patient presented to the on 12/8/2020 with a primary complaint of BUTTOCK AND VAGINAL PAIN (S/P RADIATION), COLD S/S, Chills, and Cough     She has chronic radiation changes to her vulva and perineum which are intermittently bothersome for her. She has chronic diarrhea and is often incontinent of urine and stool.   Currently, she says she has had worsening pain and erythema, which are now severe, to the area over the past couple of days and thinks she is developing an infection. She has had some chills but denies fevers.   She also c/o of a dry cough for the past few days.   Denies chest pain, sick contacts, diaphoresis, lightheadedness, syncope.   She was started on meropenem and vancomycin in the ED.     Overview/Hospital Course:  12/9/2020  Pt has a considerable amount of perianal pain. She has a short gut and chronic diarrhea. Her perianal pain was 10/10. She states that she saw Dr Neal this AM.    12/10/2020  Ms Hutchins is feeling better with less perianal pain.    Interval History: Pt is feeling better with antibiotic treatment and local care to affected areas    Review of Systems   Constitutional: Positive for diaphoresis and fatigue. Negative for chills and fever.   HENT: Negative.    Eyes: Negative.    Respiratory: Negative.     Cardiovascular: Negative.    Gastrointestinal: Negative.    Endocrine: Negative.    Genitourinary: Negative.    Musculoskeletal: Negative.    Skin: Positive for rash and wound.        perianal   Allergic/Immunologic: Positive for immunocompromised state.   Neurological: Positive for weakness.   Hematological: Bruises/bleeds easily.   Psychiatric/Behavioral: Positive for decreased concentration and dysphoric mood. The patient is nervous/anxious.         Resolving with treatment.     Objective:     Vital Signs (Most Recent):  Temp: 98.1 °F (36.7 °C) (12/10/20 1100)  Pulse: 63 (12/10/20 1100)  Resp: 18 (12/10/20 1212)  BP: 132/64 (12/10/20 1100)  SpO2: 97 % (12/10/20 1100) Vital Signs (24h Range):  Temp:  [97.5 °F (36.4 °C)-98.7 °F (37.1 °C)] 98.1 °F (36.7 °C)  Pulse:  [53-70] 63  Resp:  [16-20] 18  SpO2:  [96 %-97 %] 97 %  BP: (100-145)/(53-66) 132/64     Weight: 60.4 kg (133 lb 2.5 oz)  Body mass index is 20.86 kg/m².    Intake/Output Summary (Last 24 hours) at 12/10/2020 1413  Last data filed at 12/10/2020 0600  Gross per 24 hour   Intake 980 ml   Output 1600 ml   Net -620 ml      Physical Exam  Vitals signs and nursing note reviewed.   Constitutional:       General: She is not in acute distress.     Appearance: Normal appearance.   HENT:      Head: Normocephalic and atraumatic.      Nose: Nose normal.      Mouth/Throat:      Mouth: Mucous membranes are moist.   Eyes:      Extraocular Movements: Extraocular movements intact.      Pupils: Pupils are equal, round, and reactive to light.   Neck:      Musculoskeletal: Normal range of motion and neck supple.   Cardiovascular:      Rate and Rhythm: Normal rate and regular rhythm.   Pulmonary:      Effort: Pulmonary effort is normal.      Breath sounds: Normal breath sounds.   Abdominal:      General: Bowel sounds are normal. There is no distension.      Tenderness: There is abdominal tenderness. There is no guarding or rebound.   Genitourinary:     Comments: Please see  Dr Neal's note  Skin:     General: Skin is warm.   Neurological:      General: No focal deficit present.      Mental Status: She is alert and oriented to person, place, and time.   Psychiatric:         Mood and Affect: Mood normal.         Significant Labs:   Recent Lab Results       12/10/20  0617   12/09/20  2323   12/09/20  2216        Anion Gap 6         Appearance, UA   Cloudy       Bacteria, UA   Negative       Baso # 0.01         Basophil % 0.2         Bilirubin (UA)   Negative       BUN 13         C difficile Toxins A+B, EIA     Negative  Comment:  Testing not recommended for children <24 months old.     C. diff Antigen     Negative     Calcium 8.3         Chloride 112         CO2 23         Color, UA   Yellow       Creatinine 2.1         Differential Method Automated         eGFR if  26.9         eGFR if non  23.3  Comment:  Calculation used to obtain the estimated glomerular filtration  rate (eGFR) is the CKD-EPI equation.            Eos # 0.1         Eosinophil % 2.7         Glucose 82         Glucose, UA   Negative       Gran # (ANC) 3.7         Gran % 71.2         Hematocrit 33.7         Hemoglobin 10.0         Hyaline Casts, UA   10       Immature Grans (Abs) 0.01  Comment:  Mild elevation in immature granulocytes is non specific and   can be seen in a variety of conditions including stress response,   acute inflammation, trauma and pregnancy. Correlation with other   laboratory and clinical findings is essential.           Immature Granulocytes 0.2         Ketones, UA   Negative       Leukocytes, UA   3+       Lymph # 0.9         Lymph % 18.0         Magnesium 1.6         MCH 32.7         MCHC 29.7                  Microscopic Comment   SEE COMMENT  Comment:  Other formed elements not mentioned in the report are not   present in the microscopic examination.          Mono # 0.4         Mono % 7.7         MPV 10.6         NITRITE UA   Negative       nRBC 0          Occult Blood UA   3+       pH, UA   6.0       Platelets 128         Potassium 4.0         Protein, UA   1+  Comment:  Recommend a 24 hour urine protein or a urine   protein/creatinine ratio if globulin induced proteinuria is  clinically suspected.         RBC 3.06         RBC, UA   1       RDW 14.6         Sodium 141         Specific Gravity, UA   1.020       Specimen UA   Urine, Clean Catch       Squam Epithel, UA   19       UROBILINOGEN UA   Negative       Vancomycin, Random 7.0         WBC, UA   >100       WBC 5.17               Significant Imaging: I have reviewed all pertinent imaging results/findings within the past 24 hours.      Assessment/Plan:   12/10/2020  A)  Perianal cellulitis and lymphedema secondary to chronic diarrhea  Short gut syndrome   ESRD on hemodialysis  HTN  Hx of vulvar cancer-could she have Bowen's disease of the anus  P)  Nephrology and ID consults appreciated  Cholestyramine changed to TID  Otherwise continue present therapy    12/9/2020  A)  Perianal edema with erythema and irritation in a patient with short gut syndrome and chronic diarrhea  ESRD on hemodialysis  HTN  Hx of Vulvar cancer  P)  Rocephin 1 gram Q 24  Diflucan orally  Hydrocodone 10 po Q 6 PRN pain  ID consult appreciated  Nephrology consulted for dialysis  Increase cholestyramine to TID     No notes have been filed under this hospital service.  Service: Hospital Medicine    VTE Risk Mitigation (From admission, onward)         Ordered     IP VTE HIGH RISK PATIENT  Once      12/09/20 0227     Place sequential compression device  Until discontinued      12/09/20 0227     Place JA hose  Until discontinued      12/09/20 0227                Discharge Planning   RODRIGO:      Code Status: Full Code   Is the patient medically ready for discharge?:     Reason for patient still in hospital (select all that apply): Treatment and Consult recommendations  Discharge Plan A: Home with family                  Ashwin Diaz,  MD  Department of Hospital Medicine   [facility]

## 2020-12-10 NOTE — PROGRESS NOTES
Pharmacokinetic Assessment Follow Up: IV Vancomycin    Vancomycin serum concentration assessment(s):    The random level was drawn correctly and can be used to guide therapy at this time. The measurement is below the desired definitive target range of 10 to 15 mcg/mL.    Vancomycin Regimen Plan:    Continue regimen to Vancomycin 1000 mg IV every approximately every 48  depending on random vancomycin level after dialysis.  hours with next serum trough concentration measured at 0400 prior to next dose on Saturday 12/12/2020    Drug levels (last 3 results):  Recent Labs   Lab Result Units 12/10/20  0617   Vancomycin, Random ug/mL 7.0       Pharmacy will continue to follow and monitor vancomycin.    Please contact pharmacy at extension [phone] for questions regarding this assessment.    Thank you for the consult,   Clement Thomas       Patient brief summary:  Shara Hutchins is a 70 y.o. female initiated on antimicrobial therapy with IV Vancomycin for treatment of skin & soft tissue infection    The patient's current regimen is 1000 mg vancomycin and follow lab levels    Drug Allergies:   Review of patient's allergies indicates:   Allergen Reactions    Ciprofloxacin Other (See Comments)     Elevated liver enzymes      Pcn [penicillins] Anaphylaxis     TOLERATES ROCEPHIN WITHOUT DIFFICULTY       Actual Body Weight:   60.4 kg    Renal Function:   Estimated Creatinine Clearance: 23.8 mL/min (A) (based on SCr of 2.1 mg/dL (H)).,     Dialysis Method (if applicable):  intermittent HD    CBC (last 72 hours):  Recent Labs   Lab Result Units 12/08/20  1833 12/09/20  0618 12/10/20  0617   WBC K/uL 8.20 7.70 5.17   Hemoglobin g/dL 12.1 11.1* 10.0*   Hematocrit % 39.6 36.2* 33.7*   Platelets K/uL 187 153 128*   Gran % % 72.8 78.0* 71.2   Lymph % % 17.7* 11.4* 18.0   Mono % % 7.6 8.6 7.7   Eosinophil % % 1.2 1.3 2.7   Basophil % % 0.2 0.3 0.2   Differential Method  Automated Automated Automated       Metabolic Panel (last 72  hours):  Recent Labs   Lab Result Units 12/08/20  1833 12/08/20  2053 12/09/20  0617 12/09/20  2323 12/10/20  0617   Sodium mmol/L 138  --  138  --  141   Potassium mmol/L 3.3*  --  3.5  --  4.0   Chloride mmol/L 104  --  105  --  112*   CO2 mmol/L 24  --  25  --  23   Glucose mg/dL 86  --  82  --  82   Glucose, UA   --  Negative  --  Negative  --    BUN mg/dL 24*  --  24*  --  13   Creatinine mg/dL 2.4*  --  2.5*  --  2.1*   Creatinine, Urine mg/dL  --  38.0  --   --   --    Albumin g/dL 3.9  --   --   --   --    Total Bilirubin mg/dL 0.9  --   --   --   --    Alkaline Phosphatase U/L 135  --   --   --   --    AST U/L 18  --   --   --   --    ALT U/L 18  --   --   --   --    Magnesium mg/dL 1.5*  --  1.7  --  1.6   Phosphorus mg/dL 3.5  --   --   --   --        Vancomycin Administrations:  vancomycin given in the last 96 hours                     vancomycin in dextrose 5 % 1 gram/250 mL IVPB 1,000 mg (mg) 1,000 mg New Bag 12/08/20 2258                    Microbiologic Results:  Microbiology Results (last 7 days)       Procedure Component Value Units Date/Time    Urine culture [[id number]] Collected: 12/09/20 2323    Order Status: No result Specimen: Urine Updated: 12/10/20 0037    Clostridium difficile EIA [[id number]] Collected: 12/09/20 2216    Order Status: Completed Specimen: Stool Updated: 12/09/20 2317     C. diff Antigen Negative     C difficile Toxins A+B, EIA Negative     Comment: Testing not recommended for children <24 months old.       Blood culture x two cultures. Draw prior to antibiotics. [[id number]] Collected: 12/08/20 2006    Order Status: Completed Specimen: Blood from Peripheral, Wrist, Right Updated: 12/09/20 2232     Blood Culture, Routine No Growth to date      No Growth to date    Narrative:      Aerobic and anaerobic    Blood culture x two cultures. Draw prior to antibiotics. [[id number]] Collected: 12/08/20 1260    Order Status: Completed Specimen: Blood from Peripheral, Antecubital, Right  Updated: 12/09/20 2032     Blood Culture, Routine No Growth to date      No Growth to date    Narrative:      Aerobic and anaerobic    Urine culture [[id number]] Collected: 12/08/20 2053    Order Status: No result Specimen: Urine from Clean Catch Updated: 12/09/20 0903    Culture, Respiratory with Gram Stain [[id number]]     Order Status: Canceled Specimen: Respiratory from Sputum, Expectorated     Urine Culture High Risk [[id number]]     Order Status: Completed Specimen: Urine, Clean Catch

## 2020-12-10 NOTE — PROGRESS NOTES
Consult Note  Nephrology    Consult Requested By: Ashwin Diaz MD    Reason for Consult: ESRD    SUBJECTIVE:     History of Present Illness:  71 y/o female patient known to our practice, has HD 3x wk on MWF.  Admitted w/UTI, cellulitis.  Renal is consulted for dialysis orders.    12/10  No nausea, chest pain, sob, fever, urinary or bowel complaint, new neurologic symptoms, new joint pain,      Assessment/plan:    ESRD  Mild hypokalemia  DM 2  HTN  Anemia of chronic dz  SHPT    --continue HD on pt's schedule, MWF  --correct K+ w/HD  --Blood glucose control per primary team.  --VSS  --no need for epo, Hgb 11  --PO4 at goal, not on binders      Past Medical History:   Diagnosis Date    Cellulitis of trunk     Perineum, numerous episodes    Chronic diarrhea 02/10/2020    Short-gut syndrome    Diabetes mellitus, type 2     pt has not had for years after losing weight    End stage renal disease on dialysis 01/07/2018    Fatigue     Hypertension     Hypothyroidism 2/10/2020    Iron deficiency anemia due to chronic blood loss 1/7/2018    Pulmonary hypertension 3/20/2020    Vulvar cancer 1/7/2019     Past Surgical History:   Procedure Laterality Date    ABDOMINAL SURGERY      APPENDECTOMY      AV FISTULA PLACEMENT Left 2018    BACK SURGERY      BLADDER FULGURATION N/A 6/16/2020    Procedure: FULGURATION, BLADDER;  Surgeon: Damari Barber Jr., MD;  Location: [facility];  Service: Urology;  Laterality: N/A;    carpel tunnel surgery once on each hand      CERVICAL FUSION      x 4    cervix repair      CHOLECYSTECTOMY  2000    COLECTOMY      CYSTOSCOPY W/ RETROGRADES  6/16/2020    Procedure: CYSTOSCOPY, WITH RETROGRADE PYELOGRAM;  Surgeon: Damari Barber Jr., MD;  Location: [facility] OR;  Service: Urology;;    DIAGNOSTIC LAPAROSCOPY N/A 2/13/2020    Procedure: LAPAROSCOPY, DIAGNOSTIC;  Surgeon: Robbi Lovell III, MD;  Location: [facility] OR;  Service: General;  Laterality: N/A;    HYSTERECTOMY  1980     ILEOSTOMY N/A 2020    Procedure: CREATION, ILEOSTOMY Loop;  Surgeon: Robbi Lovell III, MD;  Location: [facility] OR;  Service: General;  Laterality: N/A;    ILEOSTOMY CLOSURE N/A 2020    Procedure: CLOSURE, ILEOSTOMY;  Surgeon: Robbi Lovell III, MD;  Location: [facility] OR;  Service: General;  Laterality: N/A;    LUMBAR LAMINECTOMY      LYSIS OF ADHESIONS N/A 2020    Procedure: LYSIS, ADHESIONS;  Surgeon: Robbi Lovell III, MD;  Location: [facility] OR;  Service: General;  Laterality: N/A;    NECK SURGERY      PHLEBOGRAPHY  2020    Procedure: VENOGRAM;  Surgeon: Robbi Lovell III, MD;  Location: [facility] OR;  Service: General;;    REMOVAL OF VASCULAR ACCESS PORT Right 2020    Procedure: REMOVAL, VASCULAR ACCESS PORT;  Surgeon: Robbi Lovell III, MD;  Location: [facility];  Service: General;  Laterality: Right;    SHOULDER SURGERY      vulva cancer       Family History   Problem Relation Age of Onset    Heart disease Mother     Hypertension Mother     Cancer Mother         lung    Heart disease Father     Heart disease Sister         Open heart surgery    No Known Problems Daughter      Social History     Tobacco Use    Smoking status: Former Smoker     Packs/day: 1.00     Years: 33.00     Pack years: 33.00     Types: Cigarettes     Quit date: 2000     Years since quittin.4    Smokeless tobacco: Former User     Quit date:    Substance Use Topics    Alcohol use: No    Drug use: No       Review of patient's allergies indicates:   Allergen Reactions    Ciprofloxacin Other (See Comments)     Elevated liver enzymes      Pcn [penicillins] Anaphylaxis     TOLERATES ROCEPHIN WITHOUT DIFFICULTY        Review of Systems:  General ROS: negative for - fever or night sweats  Psychological ROS: negative for - behavioral disorder or depression  ENT ROS: negative for - headaches or visual changes  Hematological and Lymphatic ROS: negative for - bleeding problems or  bruising  Endocrine ROS: negative for - temperature intolerance or unexpected weight changes  Respiratory ROS: no cough, shortness of breath, or wheezing  Cardiovascular ROS: no chest pain, SOB  Gastrointestinal ROS: no abdominal pain, no n/v/c/d  Genito-Urinary ROS: s/p radiation tx, painful  Musculoskeletal ROS: negative for - joint pain or joint swelling  Neurological ROS: no TIA or stroke symptoms      OBJECTIVE:     Vital Signs Range (Last 24H):  Temp:  [97.5 °F (36.4 °C)-98.7 °F (37.1 °C)]   Pulse:  [53-70]   Resp:  [16-20]   BP: (100-145)/(53-66)   SpO2:  [96 %-97 %]     Physical Exam:  General- NAD noted  HEENT- WNL  Neck- supple  CV- Regular rate and rhythm  Resp- Lungs CTA Bilaterally, No increased WOB  GI- Non tender/non-distended, BS normoactive x4 quads  Extrem- No cyanosis, clubbing, edema.  Derm- skin w/d  Neuro-  No flap.     Body mass index is 20.86 kg/m².    Laboratory:  CBC:   Recent Labs   Lab 12/10/20  0617   WBC 5.17   RBC 3.06*   HGB 10.0*   HCT 33.7*   *   *   MCH 32.7*   MCHC 29.7*     CMP:   Recent Labs   Lab 12/08/20  1833  12/10/20  0617   GLU 86   < > 82   CALCIUM 8.8   < > 8.3*   ALBUMIN 3.9  --   --    PROT 6.4  --   --       < > 141   K 3.3*   < > 4.0   CO2 24   < > 23      < > 112*   BUN 24*   < > 13   CREATININE 2.4*   < > 2.1*   ALKPHOS 135  --   --    ALT 18  --   --    AST 18  --   --    BILITOT 0.9  --   --     < > = values in this interval not displayed.       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         Thank you for allowing us to participate in the care of your patient. We will follow the patient and provide recommendations as needed.      Time spent seeing patient( greater than 1/2 spent in direct contact) :     Patient care was time spent personally by me on the following activities:   · Obtaining a history  · Examination of patient.  · Providing medical care at the patients bedside.  · Developing a treatment plan with patient or surrogate and bedside caregivers  · Ordering and reviewing laboratory studies, radiographic studies, pulse oximetry.  · Ordering and performing treatments and interventions.  · Evaluation of patient's response to treatment.  · Discussions with consultants while on the unit and immediately available to the patient.  · Re-evaluation of the patient's condition.  · Documentation in the medical record.       Raúl Dietz MD  Nephrology  [facility]  [phone]

## 2020-12-10 NOTE — SUBJECTIVE & OBJECTIVE
Interval History: Pt is feeling better with antibiotic treatment and local care to affected areas    Review of Systems   Constitutional: Positive for diaphoresis and fatigue. Negative for chills and fever.   HENT: Negative.    Eyes: Negative.    Respiratory: Negative.    Cardiovascular: Negative.    Gastrointestinal: Negative.    Endocrine: Negative.    Genitourinary: Negative.    Musculoskeletal: Negative.    Skin: Positive for rash and wound.        perianal   Allergic/Immunologic: Positive for immunocompromised state.   Neurological: Positive for weakness.   Hematological: Bruises/bleeds easily.   Psychiatric/Behavioral: Positive for decreased concentration and dysphoric mood. The patient is nervous/anxious.         Resolving with treatment.     Objective:     Vital Signs (Most Recent):  Temp: 98.1 °F (36.7 °C) (12/10/20 1100)  Pulse: 63 (12/10/20 1100)  Resp: 18 (12/10/20 1212)  BP: 132/64 (12/10/20 1100)  SpO2: 97 % (12/10/20 1100) Vital Signs (24h Range):  Temp:  [97.5 °F (36.4 °C)-98.7 °F (37.1 °C)] 98.1 °F (36.7 °C)  Pulse:  [53-70] 63  Resp:  [16-20] 18  SpO2:  [96 %-97 %] 97 %  BP: (100-145)/(53-66) 132/64     Weight: 60.4 kg (133 lb 2.5 oz)  Body mass index is 20.86 kg/m².    Intake/Output Summary (Last 24 hours) at 12/10/2020 1413  Last data filed at 12/10/2020 0600  Gross per 24 hour   Intake 980 ml   Output 1600 ml   Net -620 ml      Physical Exam  Vitals signs and nursing note reviewed.   Constitutional:       General: She is not in acute distress.     Appearance: Normal appearance.   HENT:      Head: Normocephalic and atraumatic.      Nose: Nose normal.      Mouth/Throat:      Mouth: Mucous membranes are moist.   Eyes:      Extraocular Movements: Extraocular movements intact.      Pupils: Pupils are equal, round, and reactive to light.   Neck:      Musculoskeletal: Normal range of motion and neck supple.   Cardiovascular:      Rate and Rhythm: Normal rate and regular rhythm.   Pulmonary:      Effort:  Pulmonary effort is normal.      Breath sounds: Normal breath sounds.   Abdominal:      General: Bowel sounds are normal. There is no distension.      Tenderness: There is abdominal tenderness. There is no guarding or rebound.   Genitourinary:     Comments: Please see Dr Neal's note  Skin:     General: Skin is warm.   Neurological:      General: No focal deficit present.      Mental Status: She is alert and oriented to person, place, and time.   Psychiatric:         Mood and Affect: Mood normal.         Significant Labs:   Recent Lab Results       12/10/20  0617   12/09/20  2323   12/09/20  2216        Anion Gap 6         Appearance, UA   Cloudy       Bacteria, UA   Negative       Baso # 0.01         Basophil % 0.2         Bilirubin (UA)   Negative       BUN 13         C difficile Toxins A+B, EIA     Negative  Comment:  Testing not recommended for children <24 months old.     C. diff Antigen     Negative     Calcium 8.3         Chloride 112         CO2 23         Color, UA   Yellow       Creatinine 2.1         Differential Method Automated         eGFR if  26.9         eGFR if non  23.3  Comment:  Calculation used to obtain the estimated glomerular filtration  rate (eGFR) is the CKD-EPI equation.            Eos # 0.1         Eosinophil % 2.7         Glucose 82         Glucose, UA   Negative       Gran # (ANC) 3.7         Gran % 71.2         Hematocrit 33.7         Hemoglobin 10.0         Hyaline Casts, UA   10       Immature Grans (Abs) 0.01  Comment:  Mild elevation in immature granulocytes is non specific and   can be seen in a variety of conditions including stress response,   acute inflammation, trauma and pregnancy. Correlation with other   laboratory and clinical findings is essential.           Immature Granulocytes 0.2         Ketones, UA   Negative       Leukocytes, UA   3+       Lymph # 0.9         Lymph % 18.0         Magnesium 1.6         MCH 32.7         MCHC 29.7                   Microscopic Comment   SEE COMMENT  Comment:  Other formed elements not mentioned in the report are not   present in the microscopic examination.          Mono # 0.4         Mono % 7.7         MPV 10.6         NITRITE UA   Negative       nRBC 0         Occult Blood UA   3+       pH, UA   6.0       Platelets 128         Potassium 4.0         Protein, UA   1+  Comment:  Recommend a 24 hour urine protein or a urine   protein/creatinine ratio if globulin induced proteinuria is  clinically suspected.         RBC 3.06         RBC, UA   1       RDW 14.6         Sodium 141         Specific Gravity, UA   1.020       Specimen UA   Urine, Clean Catch       Squam Epithel, UA   19       UROBILINOGEN UA   Negative       Vancomycin, Random 7.0         WBC, UA   >100       WBC 5.17               Significant Imaging: I have reviewed all pertinent imaging results/findings within the past 24 hours.

## 2020-12-10 NOTE — PROGRESS NOTES
Consult Note  Infectious Disease    Reason for Consult:  Perineal cellulitis    HPI: Shara Hutchins is a   70 y.o. female with whom I am familiar, last seen August of 2019, has chronic diarrhea from short gut syndrome and prior episodes of perineal cellulitis who began to feel swelling in the vulva and in her buttocks over the last 2-3 days with worsening induration.  She had a low-grade temperature and malaise.  She came to the emergency room yesterday and was diagnosed with cellulitis of the perineum, pneumonia and urinary tract infection.  She said some postnasal drip and nighttime cough without purulent sputum or purulent nasal discharge.  She has no pleurisy or mitis is.  His very difficult for her to discern dysuria because her perineum is so irritated but she believes she may have some dysuria.  She has up to 12 bowel movements today as every time she voids she has a liquid stool.  She is on cholestyramine twice daily but this does not control her symptoms.  She has been treated with gattex in the past but it stopped working.  She was placed empirically on vancomycin and meropenem and admitted to the Hospital Medicine Service.    12/10: afebrile, VSS, normoxic, urine culture with gram negative estelle. She did have some relief of pain with topical lidocaine. The vulva is less edematous and indurated the buttocks are the same. She is eating. She only received one dose of questran yesterday. Still coughing a little but lungs don't sound any worse. She is producing no sputum really. She is getting up to the chairs in her room and walking back and forth.            EXAM & DIAGNOSTICS REVIEWED:   Vitals:     Temp:  [97.5 °F (36.4 °C)-98.7 °F (37.1 °C)]   Temp: 98.1 °F (36.7 °C) (12/10/20 1100)  Pulse: 63 (12/10/20 1100)  Resp: 18 (12/10/20 1212)  BP: 132/64 (12/10/20 1100)  SpO2: 97 % (12/10/20 1100)    Intake/Output Summary (Last 24 hours) at 12/10/2020 1403  Last data filed at 12/10/2020 0600  Gross per 24 hour    Intake 980 ml   Output 1600 ml   Net -620 ml       General:  In NAD. Alert and attentive, cooperative, comfortable, non toxic  Eyes:  Anicteric,  EOMI  ENT:  No ulcers, exudates, thrush, nares patent,   Neck:  supple,    Lungs: Very faint crackles R base, could be atelectasis, no change  Heart:  RRR, no gallop/murmur/rub noted  Abd:  Soft, NT, ND, normal BS, no masses or organomegaly appreciated.  :  Voids, improvement in edematous and erythematous vulva with decreased induration., with stable findings on inner buttocks. The tissues are so swollen as to make discerning the anus difficult. No palpable abscess. No ischemia, necrosis, crepitance, or signs of necrotizing infection. Understandably tender. No vesicles  Musc:  Joints without effusion, swelling, erythema, synovitis,but she has had substantial muscle wasting.   Skin:   . No other rashes  Wound:   Neuro:  Alert, attentive, speech fluent, face symmetric, moves all extremities, no focal weakness. Ambulatory  Psych:  Calm, cooperative  Lymphatic:        Extrem: No edema, erythema, phlebitis, cellulitis, warm and well perfused  VAD:   Peripheral. Left arm AVF function    Isolation:  Contact for history of VRE/MRSA    Lines/Tubes/Drains:    General Labs reviewed:  Recent Labs   Lab 12/08/20 1833 12/09/20 0618 12/10/20  0617   WBC 8.20 7.70 5.17   HGB 12.1 11.1* 10.0*   HCT 39.6 36.2* 33.7*    153 128*       Recent Labs   Lab 12/08/20  1833 12/09/20  0617 12/10/20  0617    138 141   K 3.3* 3.5 4.0    105 112*   CO2 24 25 23   BUN 24* 24* 13   CREATININE 2.4* 2.5* 2.1*   CALCIUM 8.8 8.6* 8.3*   PROT 6.4  --   --    BILITOT 0.9  --   --    ALKPHOS 135  --   --    ALT 18  --   --    AST 18  --   --            Micro:  Microbiology Results (last 7 days)     Procedure Component Value Units Date/Time    Blood culture x two cultures. Draw prior to antibiotics. [[id number]] Collected: 12/08/20 2006    Order Status: Completed Specimen: Blood from  Peripheral, Wrist, Right Updated: 12/10/20 1054     Blood Culture, Routine Gram stain aer bottle: Gram positive cocci in clusters resembling Staph      Results called to and read back by: Keri Blount on 1 west by ANA LAURA      12/10/2020  10:53    Narrative:      Aerobic and anaerobic    Urine culture [[id number]]  (Abnormal) Collected: 12/08/20 2053    Order Status: Completed Specimen: Urine from Clean Catch Updated: 12/10/20 0907     Urine Culture, Routine GRAM NEGATIVE CRISTHIAN, LACTOSE   >100,000 cfu/ml  Identification and susceptibility pending      Urine culture [[id number]] Collected: 12/09/20 2323    Order Status: No result Specimen: Urine Updated: 12/10/20 0037    Clostridium difficile EIA [[id number]] Collected: 12/09/20 2216    Order Status: Completed Specimen: Stool Updated: 12/09/20 2317     C. diff Antigen Negative     C difficile Toxins A+B, EIA Negative     Comment: Testing not recommended for children <24 months old.       Blood culture x two cultures. Draw prior to antibiotics. [[id number]] Collected: 12/08/20 1830    Order Status: Completed Specimen: Blood from Peripheral, Antecubital, Right Updated: 12/09/20 2032     Blood Culture, Routine No Growth to date      No Growth to date    Narrative:      Aerobic and anaerobic    Culture, Respiratory with Gram Stain [[id number]]     Order Status: Canceled Specimen: Respiratory from Sputum, Expectorated     Urine Culture High Risk [[id number]]     Order Status: Completed Specimen: Urine, Clean Catch         Imaging Reviewed:   CXR   CT abd/pelvis  Cardiology:    IMPRESSION & PLAN   1. Perineal cellulitis, more induration, some element of venous congestion or lymphedema   Maceration from chronic diarrhea  2. Abnormal CXr, minimal respiratory symptoms, mostly post nasal drip  3. Abnormal UA,   4. Chronic diarrhea, short gut, inadequately controlled on bid questran  5. ESRD on dialysis      Recommendations:  vanc and rocephin    topical lidocaine to calmoseptine  or zinc to perineum for comfort    questran to TID(she was unaware that it must be  from other meds)    diflucan po  Sputum culture  Repeat CXR tomorrow?    Medical Decision Making during this encounter was  [_] Low Complexity  [_] Moderate Complexity  [ x ] High Complexity  D/w Dr. Diaz

## 2020-12-11 LAB
ANION GAP SERPL CALC-SCNC: 7 MMOL/L (ref 8–16)
BACTERIA BLD CULT: ABNORMAL
BACTERIA UR CULT: ABNORMAL
BACTERIA UR CULT: ABNORMAL
BACTERIA UR CULT: NORMAL
BACTERIA UR CULT: NORMAL
BASOPHILS # BLD AUTO: 0.01 K/UL (ref 0–0.2)
BASOPHILS NFR BLD: 0.2 % (ref 0–1.9)
BUN SERPL-MCNC: 17 MG/DL (ref 8–23)
CALCIUM SERPL-MCNC: 7.6 MG/DL (ref 8.7–10.5)
CHLORIDE SERPL-SCNC: 114 MMOL/L (ref 95–110)
CO2 SERPL-SCNC: 19 MMOL/L (ref 23–29)
CREAT SERPL-MCNC: 2.7 MG/DL (ref 0.5–1.4)
DIFFERENTIAL METHOD: ABNORMAL
EOSINOPHIL # BLD AUTO: 0.2 K/UL (ref 0–0.5)
EOSINOPHIL NFR BLD: 3.1 % (ref 0–8)
ERYTHROCYTE [DISTWIDTH] IN BLOOD BY AUTOMATED COUNT: 14.4 % (ref 11.5–14.5)
EST. GFR  (AFRICAN AMERICAN): 19.8 ML/MIN/1.73 M^2
EST. GFR  (NON AFRICAN AMERICAN): 17.2 ML/MIN/1.73 M^2
GLUCOSE SERPL-MCNC: 74 MG/DL (ref 70–110)
HBV SURFACE AG SERPL QL IA: NEGATIVE
HCT VFR BLD AUTO: 32.1 % (ref 37–48.5)
HGB BLD-MCNC: 9.5 G/DL (ref 12–16)
IMM GRANULOCYTES # BLD AUTO: 0.03 K/UL (ref 0–0.04)
IMM GRANULOCYTES NFR BLD AUTO: 0.6 % (ref 0–0.5)
LYMPHOCYTES # BLD AUTO: 1 K/UL (ref 1–4.8)
LYMPHOCYTES NFR BLD: 19.1 % (ref 18–48)
MAGNESIUM SERPL-MCNC: 1.5 MG/DL (ref 1.6–2.6)
MCH RBC QN AUTO: 33.7 PG (ref 27–31)
MCHC RBC AUTO-ENTMCNC: 29.6 G/DL (ref 32–36)
MCV RBC AUTO: 114 FL (ref 82–98)
MONOCYTES # BLD AUTO: 0.5 K/UL (ref 0.3–1)
MONOCYTES NFR BLD: 9.5 % (ref 4–15)
NEUTROPHILS # BLD AUTO: 3.5 K/UL (ref 1.8–7.7)
NEUTROPHILS NFR BLD: 67.5 % (ref 38–73)
NRBC BLD-RTO: 0 /100 WBC
PLATELET # BLD AUTO: 138 K/UL (ref 150–350)
PMV BLD AUTO: 10.5 FL (ref 9.2–12.9)
POTASSIUM SERPL-SCNC: 4.3 MMOL/L (ref 3.5–5.1)
RBC # BLD AUTO: 2.82 M/UL (ref 4–5.4)
SODIUM SERPL-SCNC: 140 MMOL/L (ref 136–145)
WBC # BLD AUTO: 5.14 K/UL (ref 3.9–12.7)

## 2020-12-11 PROCEDURE — 83735 ASSAY OF MAGNESIUM: CPT

## 2020-12-11 PROCEDURE — 99232 PR SUBSEQUENT HOSPITAL CARE,LEVL II: ICD-10-PCS | Mod: ,,, | Performed by: INTERNAL MEDICINE

## 2020-12-11 PROCEDURE — 63700000 PHARM REV CODE 250 ALT 637 W/O HCPCS: Performed by: INTERNAL MEDICINE

## 2020-12-11 PROCEDURE — 80048 BASIC METABOLIC PNL TOTAL CA: CPT

## 2020-12-11 PROCEDURE — 85025 COMPLETE CBC W/AUTO DIFF WBC: CPT

## 2020-12-11 PROCEDURE — 99232 SBSQ HOSP IP/OBS MODERATE 35: CPT | Mod: ,,, | Performed by: INTERNAL MEDICINE

## 2020-12-11 PROCEDURE — 63600175 PHARM REV CODE 636 W HCPCS: Performed by: INTERNAL MEDICINE

## 2020-12-11 PROCEDURE — 90935 HEMODIALYSIS ONE EVALUATION: CPT

## 2020-12-11 PROCEDURE — 25000003 PHARM REV CODE 250: Performed by: INTERNAL MEDICINE

## 2020-12-11 PROCEDURE — 36415 COLL VENOUS BLD VENIPUNCTURE: CPT

## 2020-12-11 PROCEDURE — 12000002 HC ACUTE/MED SURGE SEMI-PRIVATE ROOM

## 2020-12-11 RX ORDER — CHOLESTYRAMINE 4 G/4.8G
4 POWDER, FOR SUSPENSION ORAL 4 TIMES DAILY
Status: DISCONTINUED | OUTPATIENT
Start: 2020-12-11 | End: 2020-12-15 | Stop reason: HOSPADM

## 2020-12-11 RX ADMIN — LEVOTHYROXINE SODIUM 88 MCG: 88 TABLET ORAL at 05:12

## 2020-12-11 RX ADMIN — DIPHENHYDRAMINE HYDROCHLORIDE 25 MG: 25 CAPSULE ORAL at 09:12

## 2020-12-11 RX ADMIN — MUPIROCIN: 20 OINTMENT TOPICAL at 07:12

## 2020-12-11 RX ADMIN — HYDROCODONE BITARTRATE AND ACETAMINOPHEN 1 TABLET: 10; 325 TABLET ORAL at 07:12

## 2020-12-11 RX ADMIN — Medication: at 07:12

## 2020-12-11 RX ADMIN — CEFTRIAXONE SODIUM 1 G: 1 INJECTION, POWDER, FOR SOLUTION INTRAMUSCULAR; INTRAVENOUS at 04:12

## 2020-12-11 RX ADMIN — CHOLESTYRAMINE 4 G: 4 POWDER, FOR SUSPENSION ORAL at 08:12

## 2020-12-11 RX ADMIN — FLUCONAZOLE 100 MG: 100 TABLET ORAL at 07:12

## 2020-12-11 RX ADMIN — HYDROCODONE BITARTRATE AND ACETAMINOPHEN 1 TABLET: 10; 325 TABLET ORAL at 01:12

## 2020-12-11 RX ADMIN — METHYLPREDNISOLONE SODIUM SUCCINATE 40 MG: 40 INJECTION, POWDER, FOR SOLUTION INTRAMUSCULAR; INTRAVENOUS at 04:12

## 2020-12-11 RX ADMIN — CHOLESTYRAMINE 4 G: 4 POWDER, FOR SUSPENSION ORAL at 09:12

## 2020-12-11 RX ADMIN — SODIUM BICARBONATE 650 MG TABLET 650 MG: at 07:12

## 2020-12-11 RX ADMIN — MUPIROCIN: 20 OINTMENT TOPICAL at 08:12

## 2020-12-11 RX ADMIN — HYDROCODONE BITARTRATE AND ACETAMINOPHEN 1 TABLET: 10; 325 TABLET ORAL at 08:12

## 2020-12-11 RX ADMIN — DILTIAZEM HYDROCHLORIDE 120 MG: 120 CAPSULE, COATED, EXTENDED RELEASE ORAL at 07:12

## 2020-12-11 RX ADMIN — HYDROCODONE BITARTRATE AND ACETAMINOPHEN 1 TABLET: 10; 325 TABLET ORAL at 12:12

## 2020-12-11 RX ADMIN — CHOLESTYRAMINE 4 G: 4 POWDER, FOR SUSPENSION ORAL at 04:12

## 2020-12-11 RX ADMIN — SODIUM BICARBONATE 650 MG TABLET 650 MG: at 08:12

## 2020-12-11 NOTE — PROGRESS NOTES
[facility] Medicine  Progress Note    Patient Name: Shara Hutchins  MRN: [id number]  Patient Class: IP- Inpatient   Admission Date: 12/8/2020  Length of Stay: 1 days  Attending Physician: Ashwin Diaz MD  Primary Care Provider: April Horton MD        Subjective:     Principal Problem:Perineal rash, female        HPI:  History of Present Illness / Brief Hospital Course:  Shara Hutchins is a 70 y.o. female who  has a past medical history of Chronic kidney disease, stage III (moderate) (1/7/2018), Diabetes mellitus, type 2, Fatigue, Hemodialysis patient, Hypertension, Iron deficiency anemia due to chronic blood loss (1/7/2018), and Vulvar cancer (1/7/2019).. The patient presented to the on 12/8/2020 with a primary complaint of BUTTOCK AND VAGINAL PAIN (S/P RADIATION), COLD S/S, Chills, and Cough     She has chronic radiation changes to her vulva and perineum which are intermittently bothersome for her. She has chronic diarrhea and is often incontinent of urine and stool.   Currently, she says she has had worsening pain and erythema, which are now severe, to the area over the past couple of days and thinks she is developing an infection. She has had some chills but denies fevers.   She also c/o of a dry cough for the past few days.   Denies chest pain, sick contacts, diaphoresis, lightheadedness, syncope.   She was started on meropenem and vancomycin in the ED.     Overview/Hospital Course:  12/9/2020  Pt has a considerable amount of perianal pain. She has a short gut and chronic diarrhea. Her perianal pain was 10/10. She states that she saw Dr Neal this AM.    12/10/2020  Ms Hutchins is feeling better with less perianal pain.    12/11/2020  Pt still has some abdominal discomfort 4-6/10 with less diarrhea. Overall she is feeling better in the anal region.    Interval History: Pt has improved perianal pain with some diarrhea and abdominal pain. C diff EIA is negative    Review of Systems    Constitutional: Positive for diaphoresis and fatigue.   HENT: Negative.    Eyes: Negative.    Respiratory: Negative.    Cardiovascular: Negative.    Gastrointestinal: Positive for abdominal distention, abdominal pain, diarrhea and rectal pain. Negative for nausea and vomiting.   Endocrine: Positive for cold intolerance and heat intolerance.   Genitourinary: Negative.    Musculoskeletal: Positive for arthralgias and myalgias.   Skin: Positive for wound.        perianal   Allergic/Immunologic: Positive for immunocompromised state.   Neurological: Positive for weakness.   Hematological: Bruises/bleeds easily.   Psychiatric/Behavioral: Positive for decreased concentration and dysphoric mood. The patient is nervous/anxious.         Resolving with improved condition     Objective:     Vital Signs (Most Recent):  Temp: 98.1 °F (36.7 °C) (12/11/20 1020)  Pulse: 65 (12/11/20 1230)  Resp: 18 (12/11/20 1315)  BP: 131/69 (12/11/20 1230)  SpO2: 99 % (12/11/20 0749) Vital Signs (24h Range):  Temp:  [98 °F (36.7 °C)-98.4 °F (36.9 °C)] 98.1 °F (36.7 °C)  Pulse:  [63-73] 65  Resp:  [16-18] 18  SpO2:  [95 %-99 %] 99 %  BP: (119-157)/(62-78) 131/69     Weight: 60.4 kg (133 lb 2.5 oz)  Body mass index is 20.86 kg/m².  No intake or output data in the 24 hours ending 12/11/20 1625   Physical Exam  Vitals signs and nursing note reviewed. Exam conducted with a chaperone present.   Constitutional:       General: She is not in acute distress.     Appearance: She is ill-appearing.   HENT:      Head: Normocephalic and atraumatic.      Nose: Nose normal.      Mouth/Throat:      Mouth: Mucous membranes are moist.   Eyes:      Extraocular Movements: Extraocular movements intact.      Pupils: Pupils are equal, round, and reactive to light.   Neck:      Musculoskeletal: Normal range of motion and neck supple.   Cardiovascular:      Rate and Rhythm: Normal rate and regular rhythm.   Pulmonary:      Effort: Pulmonary effort is normal.      Breath  sounds: Normal breath sounds.   Abdominal:      General: There is distension.      Tenderness: There is abdominal tenderness. There is no guarding or rebound.   Musculoskeletal: Normal range of motion.   Skin:     General: Skin is warm.   Neurological:      General: No focal deficit present.      Mental Status: She is alert and oriented to person, place, and time.   Psychiatric:         Mood and Affect: Mood normal.         Significant Labs:   Recent Lab Results       12/11/20  0511        Anion Gap 7     Baso # 0.01     Basophil % 0.2     BUN 17     Calcium 7.6     Chloride 114     CO2 19     Creatinine 2.7     Differential Method Automated     eGFR if African American 19.8     eGFR if non  17.2  Comment:  Calculation used to obtain the estimated glomerular filtration  rate (eGFR) is the CKD-EPI equation.        Eos # 0.2     Eosinophil % 3.1     Glucose 74     Gran # (ANC) 3.5     Gran % 67.5     Hematocrit 32.1     Hemoglobin 9.5     Immature Grans (Abs) 0.03  Comment:  Mild elevation in immature granulocytes is non specific and   can be seen in a variety of conditions including stress response,   acute inflammation, trauma and pregnancy. Correlation with other   laboratory and clinical findings is essential.       Immature Granulocytes 0.6     Lymph # 1.0     Lymph % 19.1     Magnesium 1.5     MCH 33.7     MCHC 29.6          Mono # 0.5     Mono % 9.5     MPV 10.5     nRBC 0     Platelets 138     Potassium 4.3     RBC 2.82     RDW 14.4     Sodium 140     WBC 5.14           Significant Imaging: I have reviewed all pertinent imaging results/findings within the past 24 hours.      Assessment/Plan:   12/11/2020  A)  Perianal cellulitis and lymphedema  Short gut syndrome with diarrhea  ESRD on hemodialysis  HTN  Negative C diff EIA  P)  Nephrology and ID consults appreciated  Continue present therapy  Solumedrol 40 mg IVP       12/10/2020  A)  Perianal cellulitis and lymphedema secondary to  chronic diarrhea  Short gut syndrome   ESRD on hemodialysis  HTN  Hx of vulvar cancer-could she have Bowen's disease of the anus  P)  Nephrology and ID consults appreciated  Cholestyramine changed to TID  Otherwise continue present therapy     12/9/2020  A)  Perianal edema with erythema and irritation in a patient with short gut syndrome and chronic diarrhea  ESRD on hemodialysis  HTN  Hx of Vulvar cancer  P)  Rocephin 1 gram Q 24  Diflucan orally  Hydrocodone 10 po Q 6 PRN pain  ID consult appreciated  Nephrology consulted for dialysis  Increase cholestyramine to TID  No notes have been filed under this hospital service.  Service: Hospital Medicine    VTE Risk Mitigation (From admission, onward)         Ordered     IP VTE HIGH RISK PATIENT  Once      12/09/20 0227     Place sequential compression device  Until discontinued      12/09/20 0227     Place JA hose  Until discontinued      12/09/20 0227                Discharge Planning   RODRIGO:      Code Status: Full Code   Is the patient medically ready for discharge?:     Reason for patient still in hospital (select all that apply): Treatment and Consult recommendations  Discharge Plan A: Home with family                  Ashwin Diaz MD  Department of Hospital Medicine   [facility]

## 2020-12-11 NOTE — PLAN OF CARE
Important Message from Medicare was sign, explained and given to patient/caregiver on 12/11/2020 at 9:04am     answered all questions.

## 2020-12-11 NOTE — PROGRESS NOTES
Consult Note  Nephrology    Consult Requested By: Ashwin Diaz MD    Reason for Consult: ESRD    SUBJECTIVE:     History of Present Illness:  69 y/o female patient known to our practice, has HD 3x wk on MWF.  Admitted w/UTI, cellulitis.  Renal is consulted for dialysis orders.    12/10  No nausea, chest pain, sob, fever, urinary or bowel complaint, new neurologic symptoms, new joint pain,  12/11  Stopped hd 30 minutes early due to clotting the system    Assessment/plan:    ESRD  Mild hypokalemia  DM 2  HTN  Anemia of chronic dz  SHPT    --continue HD on pt's schedule, MWF  --correct K+ w/HD  --Blood glucose control per primary team.  --VSS  --no need for epo, Hgb 11  --PO4 at goal, not on binders      Past Medical History:   Diagnosis Date    Cellulitis of trunk     Perineum, numerous episodes    Chronic diarrhea 02/10/2020    Short-gut syndrome    Diabetes mellitus, type 2     pt has not had for years after losing weight    End stage renal disease on dialysis 01/07/2018    Fatigue     Hypertension     Hypothyroidism 2/10/2020    Iron deficiency anemia due to chronic blood loss 1/7/2018    Pulmonary hypertension 3/20/2020    Vulvar cancer 1/7/2019     Past Surgical History:   Procedure Laterality Date    ABDOMINAL SURGERY      APPENDECTOMY      AV FISTULA PLACEMENT Left 2018    BACK SURGERY      BLADDER FULGURATION N/A 6/16/2020    Procedure: FULGURATION, BLADDER;  Surgeon: Damari Barber Jr., MD;  Location[facility];  Service: Urology;  Laterality: N/A;    carpel tunnel surgery once on each hand      CERVICAL FUSION      x 4    cervix repair      CHOLECYSTECTOMY  2000    COLECTOMY      CYSTOSCOPY W/ RETROGRADES  6/16/2020    Procedure: CYSTOSCOPY, WITH RETROGRADE PYELOGRAM;  Surgeon: Damari Barber Jr., MD;  Location: [facility];  Service: Urology;;    DIAGNOSTIC LAPAROSCOPY N/A 2/13/2020    Procedure: LAPAROSCOPY, DIAGNOSTIC;  Surgeon: Robbi Lovell III, MD;  Location: [facility];  Service:  General;  Laterality: N/A;    HYSTERECTOMY  1980    ILEOSTOMY N/A 2020    Procedure: CREATION, ILEOSTOMY Loop;  Surgeon: Robbi Lovell III, MD;  Location: [facility] OR;  Service: General;  Laterality: N/A;    ILEOSTOMY CLOSURE N/A 2020    Procedure: CLOSURE, ILEOSTOMY;  Surgeon: Robbi Lovell III, MD;  Location: [facility] OR;  Service: General;  Laterality: N/A;    LUMBAR LAMINECTOMY      LYSIS OF ADHESIONS N/A 2020    Procedure: LYSIS, ADHESIONS;  Surgeon: Robbi Lovell III, MD;  Location: [facility] OR;  Service: General;  Laterality: N/A;    NECK SURGERY      PHLEBOGRAPHY  2020    Procedure: VENOGRAM;  Surgeon: Robbi Lovell III, MD;  Location: [facility] OR;  Service: General;;    REMOVAL OF VASCULAR ACCESS PORT Right 2020    Procedure: REMOVAL, VASCULAR ACCESS PORT;  Surgeon: Robbi Lovell III, MD;  [facility];  Service: General;  Laterality: Right;    SHOULDER SURGERY      vulva cancer       Family History   Problem Relation Age of Onset    Heart disease Mother     Hypertension Mother     Cancer Mother         lung    Heart disease Father     Heart disease Sister         Open heart surgery    No Known Problems Daughter      Social History     Tobacco Use    Smoking status: Former Smoker     Packs/day: 1.00     Years: 33.00     Pack years: 33.00     Types: Cigarettes     Quit date: 2000     Years since quittin.4    Smokeless tobacco: Former User     Quit date:    Substance Use Topics    Alcohol use: No    Drug use: No       Review of patient's allergies indicates:   Allergen Reactions    Ciprofloxacin Other (See Comments)     Elevated liver enzymes      Pcn [penicillins] Anaphylaxis     TOLERATES ROCEPHIN WITHOUT DIFFICULTY        Review of Systems:  General ROS: negative for - fever or night sweats  Psychological ROS: negative for - behavioral disorder or depression  ENT ROS: negative for - headaches or visual changes  Hematological and  Lymphatic ROS: negative for - bleeding problems or bruising  Endocrine ROS: negative for - temperature intolerance or unexpected weight changes  Respiratory ROS: no cough, shortness of breath, or wheezing  Cardiovascular ROS: no chest pain, SOB  Gastrointestinal ROS: no abdominal pain, no n/v/c/d  Genito-Urinary ROS: s/p radiation tx, painful  Musculoskeletal ROS: negative for - joint pain or joint swelling  Neurological ROS: no TIA or stroke symptoms      OBJECTIVE:     Vital Signs Range (Last 24H):  Temp:  [98 °F (36.7 °C)-98.4 °F (36.9 °C)]   Pulse:  [63-73]   Resp:  [16-18]   BP: (119-157)/(62-78)   SpO2:  [95 %-99 %]     Physical Exam:  General- NAD noted  HEENT- WNL  Neck- supple  CV- Regular rate and rhythm  Resp- Lungs CTA Bilaterally, No increased WOB  GI- Non tender/non-distended, BS normoactive x4 quads  Extrem- No cyanosis, clubbing, edema.  Derm- skin w/d  Neuro-  No flap.     Body mass index is 20.86 kg/m².    Laboratory:  CBC:   Recent Labs   Lab 12/11/20  0511   WBC 5.14   RBC 2.82*   HGB 9.5*   HCT 32.1*   *   *   MCH 33.7*   MCHC 29.6*     CMP:   Recent Labs   Lab 12/08/20  1833  12/11/20  0511   GLU 86   < > 74   CALCIUM 8.8   < > 7.6*   ALBUMIN 3.9  --   --    PROT 6.4  --   --       < > 140   K 3.3*   < > 4.3   CO2 24   < > 19*      < > 114*   BUN 24*   < > 17   CREATININE 2.4*   < > 2.7*   ALKPHOS 135  --   --    ALT 18  --   --    AST 18  --   --    BILITOT 0.9  --   --     < > = values in this interval not displayed.       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         Thank you for allowing us to participate in the care of your patient. We will follow the patient and provide recommendations as needed.      Time spent seeing patient( greater than 1/2 spent in direct contact) :     Patient care was time spent personally by me on the following activities:   · Obtaining a history  · Examination of patient.  · Providing medical care at the patients bedside.  · Developing a treatment plan with patient or surrogate and bedside caregivers  · Ordering and reviewing laboratory studies, radiographic studies, pulse oximetry.  · Ordering and performing treatments and interventions.  · Evaluation of patient's response to treatment.  · Discussions with consultants while on the unit and immediately available to the patient.  · Re-evaluation of the patient's condition.  · Documentation in the medical record.       Raúl Dietz MD  Nephrology  [facility]  [phone]

## 2020-12-11 NOTE — PROGRESS NOTES
Consult Note  Infectious Disease    Reason for Consult:  Perineal cellulitis    HPI: Shara Hutchins is a   70 y.o. female with whom I am familiar, last seen August of 2019, has chronic diarrhea from short gut syndrome and prior episodes of perineal cellulitis who began to feel swelling in the vulva and in her buttocks over the last 2-3 days with worsening induration.  She had a low-grade temperature and malaise.  She came to the emergency room yesterday and was diagnosed with cellulitis of the perineum, pneumonia and urinary tract infection.  She said some postnasal drip and nighttime cough without purulent sputum or purulent nasal discharge.  She has no pleurisy or mitis is.  His very difficult for her to discern dysuria because her perineum is so irritated but she believes she may have some dysuria.  She has up to 12 bowel movements today as every time she voids she has a liquid stool.  She is on cholestyramine twice daily but this does not control her symptoms.  She has been treated with gattex in the past but it stopped working.  She was placed empirically on vancomycin and meropenem and admitted to the Hospital Medicine Service.    12/10: afebrile, VSS, normoxic, urine culture with gram negative estelle. She did have some relief of pain with topical lidocaine. The vulva is less edematous and indurated the buttocks are the same. She is eating. She only received one dose of questran yesterday. Still coughing a little but lungs don't sound any worse. She is producing no sputum really. She is getting up to the chairs in her room and walking back and forth.   12/11:    Urine culture with sensitive Ecoli and beta strep. Blood culture with a contaminant, micrococcus. hgb has dropped from 12 down to 9.5 since admission. CO2 19. Does not appreciate much improvement.  The area is very difficult to elevate given that it is the most dependent part of her trunk.  She does move around but she has no gravitational advantage to  apply to reduce the swelling.  She is considering using ice which is worth a try.  She states that she is coughing more but feels no respiratory limitations.  She does not see any benefit yet of 3 times daily Questran therefore will increase to 4 times daily       EXAM & DIAGNOSTICS REVIEWED:   Vitals:     Temp:  [97.1 °F (36.2 °C)-98.4 °F (36.9 °C)]   Temp: 98.1 °F (36.7 °C) (12/11/20 1020)  Pulse: 65 (12/11/20 1230)  Resp: 18 (12/11/20 1315)  BP: 131/69 (12/11/20 1230)  SpO2: 99 % (12/11/20 0749)  No intake or output data in the 24 hours ending 12/11/20 1322    General:  In NAD. Alert and attentive, cooperative, comfortable, non toxic  Eyes:  Anicteric,  EOMI  ENT:  No ulcers, exudates, thrush, nares patent,   Neck:  supple,    Lungs: Very faint crackles R base, could be atelectasis, no change  Heart:  RRR, no gallop/murmur/rub noted  Abd:  Soft, NT, ND, normal BS, no masses or organomegaly appreciated.  :  Voids, improvement in edematous and erythematous vulva with decreased induration., with stable findings on inner buttocks. The tissues are so swollen as to make discerning the anus difficult. No palpable abscess. No ischemia, necrosis, crepitance, or signs of necrotizing infection. Understandably tender. No vesicles  Musc:  Joints without effusion, swelling, erythema, synovitis,but she has had substantial muscle wasting.   Skin:   . No other rashes  Wound:   Neuro:  Alert, attentive, speech fluent, face symmetric, moves all extremities, no focal weakness. Ambulatory  Psych:  Calm, cooperative  Lymphatic:        Extrem: No edema, erythema, phlebitis, cellulitis, warm and well perfused  VAD:   Peripheral. Left arm AVF function    Isolation:  Contact for history of VRE/MRSA    Lines/Tubes/Drains:    General Labs reviewed:  Recent Labs   Lab 12/09/20  0618 12/10/20  0617 12/11/20  0511   WBC 7.70 5.17 5.14   HGB 11.1* 10.0* 9.5*   HCT 36.2* 33.7* 32.1*    128* 138*       Recent Labs   Lab 12/08/20  7967  12/09/20  0617 12/10/20  0617 12/11/20  0511    138 141 140   K 3.3* 3.5 4.0 4.3    105 112* 114*   CO2 24 25 23 19*   BUN 24* 24* 13 17   CREATININE 2.4* 2.5* 2.1* 2.7*   CALCIUM 8.8 8.6* 8.3* 7.6*   PROT 6.4  --   --   --    BILITOT 0.9  --   --   --    ALKPHOS 135  --   --   --    ALT 18  --   --   --    AST 18  --   --   --            Micro:  Microbiology Results (last 7 days)     Procedure Component Value Units Date/Time    Urine culture [[id number]]  (Abnormal)  (Susceptibility) Collected: 12/08/20 2053    Order Status: Completed Specimen: Urine from Clean Catch Updated: 12/11/20 1322     Urine Culture, Routine ESCHERICHIA COLI  >100,000 cfu/ml        STREPTOCOCCUS GROUP G  > 100,000 cfu/ml  Presumptive ID  Beta-hemolytic streptococci are routinely susceptible to   penicillins,cephalosporins and carbapenems.      Urine culture [[id number]] Collected: 12/09/20 2323    Order Status: Completed Specimen: Urine Updated: 12/11/20 0857     Urine Culture, Routine Multiple organisms isolated. None in predominance.  Repeat if      clinically necessary.    Narrative:      Specimen Source->Urine    Blood culture x two cultures. Draw prior to antibiotics. [[id number]]  (Abnormal) Collected: 12/08/20 2006    Order Status: Completed Specimen: Blood from Peripheral, Wrist, Right Updated: 12/11/20 0827     Blood Culture, Routine Gram stain aer bottle: Gram positive cocci in clusters resembling Staph      Results called to and read back by: Keri Blount on [address] by ANA LAURA      12/10/2020  10:53      MICROCOCCUS SPECIES  Organism is a probable contaminant      Narrative:      Aerobic and anaerobic    Blood culture x two cultures. Draw prior to antibiotics. [[id number]] Collected: 12/08/20 1830    Order Status: Completed Specimen: Blood from Peripheral, Antecubital, Right Updated: 12/10/20 2032     Blood Culture, Routine No Growth to date      No Growth to date      No Growth to date    Narrative:      Aerobic and  anaerobic    Clostridium difficile EIA [[id number]] Collected: 12/09/20 2216    Order Status: Completed Specimen: Stool Updated: 12/09/20 2317     C. diff Antigen Negative     C difficile Toxins A+B, EIA Negative     Comment: Testing not recommended for children <24 months old.       Culture, Respiratory with Gram Stain [[id number]]     Order Status: Canceled Specimen: Respiratory from Sputum, Expectorated     Urine Culture High Risk [[id number]]     Order Status: Completed Specimen: Urine, Clean Catch         Imaging Reviewed:   CXR   CT abd/pelvis  Cardiology:    IMPRESSION & PLAN   1. Perineal cellulitis, more induration, some element of venous congestion or lymphedema   Maceration from chronic diarrhea  2. Abnormal CXr, minimal respiratory symptoms, mostly post nasal drip  3. Abnormal UA,   4. Chronic diarrhea, short gut, inadequately controlled on bid questran  5. ESRD on dialysis      Recommendations:  vanc and rocephin    topical lidocaine to calmoseptine or zinc to perineum for comfort    questran to TID(she was unaware that it must be  from other meds)    diflucan po     As we sometimes do with streptococcal cellulitis I will give her 1 dose of steroids to try to reduce swelling and misery as she cannot take nonsteroidals    Medical Decision Making during this encounter was  [_] Low Complexity  [_] Moderate Complexity  [ x ] High Complexity    Dr. Rodas on call this weekend

## 2020-12-12 LAB
ANION GAP SERPL CALC-SCNC: 8 MMOL/L (ref 8–16)
BASOPHILS # BLD AUTO: 0 K/UL (ref 0–0.2)
BASOPHILS NFR BLD: 0 % (ref 0–1.9)
BUN SERPL-MCNC: 13 MG/DL (ref 8–23)
CALCIUM SERPL-MCNC: 8.6 MG/DL (ref 8.7–10.5)
CHLORIDE SERPL-SCNC: 112 MMOL/L (ref 95–110)
CO2 SERPL-SCNC: 22 MMOL/L (ref 23–29)
CREAT SERPL-MCNC: 2.1 MG/DL (ref 0.5–1.4)
DIFFERENTIAL METHOD: ABNORMAL
EOSINOPHIL # BLD AUTO: 0 K/UL (ref 0–0.5)
EOSINOPHIL NFR BLD: 0 % (ref 0–8)
ERYTHROCYTE [DISTWIDTH] IN BLOOD BY AUTOMATED COUNT: 14 % (ref 11.5–14.5)
EST. GFR  (AFRICAN AMERICAN): 26.9 ML/MIN/1.73 M^2
EST. GFR  (NON AFRICAN AMERICAN): 23.3 ML/MIN/1.73 M^2
GLUCOSE SERPL-MCNC: 124 MG/DL (ref 70–110)
HCT VFR BLD AUTO: 33.9 % (ref 37–48.5)
HGB BLD-MCNC: 10.1 G/DL (ref 12–16)
IMM GRANULOCYTES # BLD AUTO: 0.02 K/UL (ref 0–0.04)
IMM GRANULOCYTES NFR BLD AUTO: 0.5 % (ref 0–0.5)
LYMPHOCYTES # BLD AUTO: 0.5 K/UL (ref 1–4.8)
LYMPHOCYTES NFR BLD: 14.2 % (ref 18–48)
MAGNESIUM SERPL-MCNC: 1.4 MG/DL (ref 1.6–2.6)
MCH RBC QN AUTO: 33.1 PG (ref 27–31)
MCHC RBC AUTO-ENTMCNC: 29.8 G/DL (ref 32–36)
MCV RBC AUTO: 111 FL (ref 82–98)
MONOCYTES # BLD AUTO: 0.1 K/UL (ref 0.3–1)
MONOCYTES NFR BLD: 1.6 % (ref 4–15)
NEUTROPHILS # BLD AUTO: 3.1 K/UL (ref 1.8–7.7)
NEUTROPHILS NFR BLD: 83.7 % (ref 38–73)
NRBC BLD-RTO: 0 /100 WBC
PHOSPHATE SERPL-MCNC: 4 MG/DL (ref 2.7–4.5)
PLATELET # BLD AUTO: 143 K/UL (ref 150–350)
PMV BLD AUTO: 10.8 FL (ref 9.2–12.9)
POTASSIUM SERPL-SCNC: 4.2 MMOL/L (ref 3.5–5.1)
RBC # BLD AUTO: 3.05 M/UL (ref 4–5.4)
SODIUM SERPL-SCNC: 142 MMOL/L (ref 136–145)
VANCOMYCIN SERPL-MCNC: 9.3 UG/ML
WBC # BLD AUTO: 3.66 K/UL (ref 3.9–12.7)

## 2020-12-12 PROCEDURE — 83735 ASSAY OF MAGNESIUM: CPT

## 2020-12-12 PROCEDURE — 63600175 PHARM REV CODE 636 W HCPCS: Performed by: INTERNAL MEDICINE

## 2020-12-12 PROCEDURE — 25000003 PHARM REV CODE 250: Performed by: INTERNAL MEDICINE

## 2020-12-12 PROCEDURE — 80202 ASSAY OF VANCOMYCIN: CPT

## 2020-12-12 PROCEDURE — 12000002 HC ACUTE/MED SURGE SEMI-PRIVATE ROOM

## 2020-12-12 PROCEDURE — 63700000 PHARM REV CODE 250 ALT 637 W/O HCPCS: Performed by: INTERNAL MEDICINE

## 2020-12-12 PROCEDURE — 36415 COLL VENOUS BLD VENIPUNCTURE: CPT

## 2020-12-12 PROCEDURE — 80048 BASIC METABOLIC PNL TOTAL CA: CPT

## 2020-12-12 PROCEDURE — 85025 COMPLETE CBC W/AUTO DIFF WBC: CPT

## 2020-12-12 PROCEDURE — 84100 ASSAY OF PHOSPHORUS: CPT

## 2020-12-12 RX ORDER — LINEZOLID 2 MG/ML
600 INJECTION, SOLUTION INTRAVENOUS
Status: DISCONTINUED | OUTPATIENT
Start: 2020-12-12 | End: 2020-12-13

## 2020-12-12 RX ORDER — VANCOMYCIN HCL IN 5 % DEXTROSE 1G/250ML
1000 PLASTIC BAG, INJECTION (ML) INTRAVENOUS ONCE
Status: COMPLETED | OUTPATIENT
Start: 2020-12-12 | End: 2020-12-12

## 2020-12-12 RX ADMIN — VANCOMYCIN HYDROCHLORIDE 1000 MG: 1 INJECTION, POWDER, LYOPHILIZED, FOR SOLUTION INTRAVENOUS at 08:12

## 2020-12-12 RX ADMIN — CHOLESTYRAMINE 4 G: 4 POWDER, FOR SUSPENSION ORAL at 02:12

## 2020-12-12 RX ADMIN — DILTIAZEM HYDROCHLORIDE 120 MG: 120 CAPSULE, COATED, EXTENDED RELEASE ORAL at 08:12

## 2020-12-12 RX ADMIN — MUPIROCIN: 20 OINTMENT TOPICAL at 08:12

## 2020-12-12 RX ADMIN — MAGNESIUM SULFATE 2 G: 2 INJECTION INTRAVENOUS at 02:12

## 2020-12-12 RX ADMIN — FLUCONAZOLE 100 MG: 100 TABLET ORAL at 08:12

## 2020-12-12 RX ADMIN — CHOLESTYRAMINE 4 G: 4 POWDER, FOR SUSPENSION ORAL at 08:12

## 2020-12-12 RX ADMIN — CHOLESTYRAMINE 4 G: 4 POWDER, FOR SUSPENSION ORAL at 04:12

## 2020-12-12 RX ADMIN — SODIUM BICARBONATE 650 MG TABLET 650 MG: at 08:12

## 2020-12-12 RX ADMIN — LINEZOLID 600 MG: 600 INJECTION, SOLUTION INTRAVENOUS at 03:12

## 2020-12-12 RX ADMIN — DIPHENHYDRAMINE HYDROCHLORIDE 25 MG: 25 CAPSULE ORAL at 08:12

## 2020-12-12 RX ADMIN — CEFTRIAXONE SODIUM 1 G: 1 INJECTION, POWDER, FOR SOLUTION INTRAMUSCULAR; INTRAVENOUS at 10:12

## 2020-12-12 RX ADMIN — HYDROCODONE BITARTRATE AND ACETAMINOPHEN 1 TABLET: 10; 325 TABLET ORAL at 08:12

## 2020-12-12 RX ADMIN — LEVOTHYROXINE SODIUM 88 MCG: 88 TABLET ORAL at 05:12

## 2020-12-12 RX ADMIN — MUPIROCIN: 20 OINTMENT TOPICAL at 09:12

## 2020-12-12 RX ADMIN — ONDANSETRON 4 MG: 2 INJECTION INTRAMUSCULAR; INTRAVENOUS at 10:12

## 2020-12-12 NOTE — PROGRESS NOTES
Consult Note  Nephrology    Consult Requested By: Ashwin Diaz MD    Reason for Consult: ESRD    SUBJECTIVE:     History of Present Illness:  69 y/o female patient known to our practice, has HD 3x wk on MWF.  Admitted w/UTI, cellulitis.  Renal is consulted for dialysis orders.    12/10  No nausea, chest pain, sob, fever, urinary or bowel complaint, new neurologic symptoms, new joint pain,  12/11  Stopped hd 30 minutes early due to clotting the system  12/12  No chest pain or sob.  No distress    Assessment/plan:    ESRD  Mild hypokalemia  DM 2  HTN  Anemia of chronic dz  SHPT    --continue HD on pt's schedule, MWF  --correct K+ w/HD  --Blood glucose control per primary team.  --VSS  --start ADAM with hd  --PO4 at goal, not on binders      Past Medical History:   Diagnosis Date    Cellulitis of trunk     Perineum, numerous episodes    Chronic diarrhea 02/10/2020    Short-gut syndrome    Diabetes mellitus, type 2     pt has not had for years after losing weight    End stage renal disease on dialysis 01/07/2018    Fatigue     Hypertension     Hypothyroidism 2/10/2020    Iron deficiency anemia due to chronic blood loss 1/7/2018    Pulmonary hypertension 3/20/2020    Vulvar cancer 1/7/2019     Past Surgical History:   Procedure Laterality Date    ABDOMINAL SURGERY      APPENDECTOMY      AV FISTULA PLACEMENT Left 2018    BACK SURGERY      BLADDER FULGURATION N/A 6/16/2020    Procedure: FULGURATION, BLADDER;  Surgeon: Damari Barber Jr., MD;  Location[facility];  Service: Urology;  Laterality: N/A;    carpel tunnel surgery once on each hand      CERVICAL FUSION      x 4    cervix repair      CHOLECYSTECTOMY  2000    COLECTOMY      CYSTOSCOPY W/ RETROGRADES  6/16/2020    Procedure: CYSTOSCOPY, WITH RETROGRADE PYELOGRAM;  Surgeon: Damari Barber Jr., MD;  Location[facility] OR;  Service: Urology;;    DIAGNOSTIC LAPAROSCOPY N/A 2/13/2020    Procedure: LAPAROSCOPY, DIAGNOSTIC;  Surgeon: Robbi Lovell  MD GLENNA;  Location: [facility] OR;  Service: General;  Laterality: N/A;    HYSTERECTOMY  1980    ILEOSTOMY N/A 2020    Procedure: CREATION, ILEOSTOMY Loop;  Surgeon: Robbi Lovell III, MD;  Location: [facility] OR;  Service: General;  Laterality: N/A;    ILEOSTOMY CLOSURE N/A 2020    Procedure: CLOSURE, ILEOSTOMY;  Surgeon: Robbi Lovell III, MD;  Location: [facility] OR;  Service: General;  Laterality: N/A;    LUMBAR LAMINECTOMY      LYSIS OF ADHESIONS N/A 2020    Procedure: LYSIS, ADHESIONS;  Surgeon: Robbi Lovell III, MD;  Location: [facility] OR;  Service: General;  Laterality: N/A;    NECK SURGERY      PHLEBOGRAPHY  2020    Procedure: VENOGRAM;  Surgeon: Robbi Lovell III, MD;  Location: [facility];  Service: General;;    REMOVAL OF VASCULAR ACCESS PORT Right 2020    Procedure: REMOVAL, VASCULAR ACCESS PORT;  Surgeon: Robbi Lovell III, MD;  Location: [facility];  Service: General;  Laterality: Right;    SHOULDER SURGERY      vulva cancer       Family History   Problem Relation Age of Onset    Heart disease Mother     Hypertension Mother     Cancer Mother         lung    Heart disease Father     Heart disease Sister         Open heart surgery    No Known Problems Daughter      Social History     Tobacco Use    Smoking status: Former Smoker     Packs/day: 1.00     Years: 33.00     Pack years: 33.00     Types: Cigarettes     Quit date: 2000     Years since quittin.4    Smokeless tobacco: Former User     Quit date:    Substance Use Topics    Alcohol use: No    Drug use: No       Review of patient's allergies indicates:   Allergen Reactions    Ciprofloxacin Other (See Comments)     Elevated liver enzymes      Pcn [penicillins] Anaphylaxis     TOLERATES ROCEPHIN WITHOUT DIFFICULTY        Review of Systems:  General ROS: negative for - fever or night sweats  Psychological ROS: negative for - behavioral disorder or depression  ENT ROS: negative for - headaches  or visual changes  Hematological and Lymphatic ROS: negative for - bleeding problems or bruising  Endocrine ROS: negative for - temperature intolerance or unexpected weight changes  Respiratory ROS: no cough, shortness of breath, or wheezing  Cardiovascular ROS: no chest pain, SOB  Gastrointestinal ROS: no abdominal pain, no n/v/c/d  Genito-Urinary ROS: s/p radiation tx, painful  Musculoskeletal ROS: negative for - joint pain or joint swelling  Neurological ROS: no TIA or stroke symptoms      OBJECTIVE:     Vital Signs Range (Last 24H):  Temp:  [97.4 °F (36.3 °C)-99.1 °F (37.3 °C)]   Pulse:  [63-76]   Resp:  [16-18]   BP: (121-149)/(50-78)   SpO2:  [94 %-97 %]     Physical Exam:  General- NAD noted  HEENT- WNL  Neck- supple  CV- Regular rate and rhythm  Resp- Lungs CTA Bilaterally, No increased WOB  GI- Non tender/non-distended, BS normoactive x4 quads  Extrem- No cyanosis, clubbing, edema.  Derm- skin w/d  Neuro-  No flap.     Body mass index is 20.86 kg/m².    Laboratory:  CBC:   Recent Labs   Lab 12/12/20  0502   WBC 3.66*   RBC 3.05*   HGB 10.1*   HCT 33.9*   *   *   MCH 33.1*   MCHC 29.8*     CMP:   Recent Labs   Lab 12/08/20  1833  12/12/20  0502   GLU 86   < > 124*   CALCIUM 8.8   < > 8.6*   ALBUMIN 3.9  --   --    PROT 6.4  --   --       < > 142   K 3.3*   < > 4.2   CO2 24   < > 22*      < > 112*   BUN 24*   < > 13   CREATININE 2.4*   < > 2.1*   ALKPHOS 135  --   --    ALT 18  --   --    AST 18  --   --    BILITOT 0.9  --   --     < > = values in this interval not displayed.       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         Thank you for allowing us to participate in the care of your patient. We will follow the patient and provide recommendations as needed.      Time spent seeing patient( greater than 1/2 spent in direct contact) :     Patient care was time spent personally by me on the following activities:   · Obtaining a history  · Examination of patient.  · Providing medical care at the patients bedside.  · Developing a treatment plan with patient or surrogate and bedside caregivers  · Ordering and reviewing laboratory studies, radiographic studies, pulse oximetry.  · Ordering and performing treatments and interventions.  · Evaluation of patient's response to treatment.  · Discussions with consultants while on the unit and immediately available to the patient.  · Re-evaluation of the patient's condition.  · Documentation in the medical record.       Raúl Dietz MD  Nephrology  [facility]  [phone]

## 2020-12-12 NOTE — PROGRESS NOTES
[facility]  Cardiology  Progress Note    Patient Name: Shara Hutchins  MRN: [id number]  Admission Date: 12/8/2020  Hospital Length of Stay: 2 days  Code Status: Full Code   Attending Physician: Ashwin Diaz MD   Primary Care Physician: April Horton MD  Expected Discharge Date:   Principal Problem:Perineal rash, female    Subjective:     Hospital Course:   12/9/2020  Pt has a considerable amount of perianal pain. She has a short gut and chronic diarrhea. Her perianal pain was 10/10. She states that she saw Dr Neal this AM.     12/10/2020  Ms Hutchins is feeling better with less perianal pain.     12/11/2020  Pt still has some abdominal discomfort 4-6/10 with less diarrhea. Overall she is feeling better in the anal region.    12/12/2020  Pt has a sore throat and would like chloraseptic spray. My bottom still hurt but is better and the diarrhea is the same.       Interval History: Pt has a sore throat. Her abdominal discomfort and diarrhea are unchanged but she does seem less weak.    Review of Systems   Constitution: Positive for malaise/fatigue.   HENT: Positive for sore throat.    Eyes: Negative.    Cardiovascular: Negative.    Endocrine: Negative.    Hematologic/Lymphatic: Bruises/bleeds easily.   Musculoskeletal: Positive for arthritis, muscle weakness and myalgias.   Gastrointestinal: Positive for bloating, abdominal pain and diarrhea.   Genitourinary: Negative.    Neurological: Positive for difficulty with concentration, light-headedness and weakness.   Psychiatric/Behavioral: The patient is nervous/anxious.    Allergic/Immunologic: Positive for persistent infections.     Objective:     Vital Signs (Most Recent):  Temp: 97.6 °F (36.4 °C) (12/12/20 1209)  Pulse: 75 (12/12/20 1209)  Resp: 16 (12/12/20 1209)  BP: (!) 142/61 (12/12/20 1209)  SpO2: 95 % (12/12/20 1209) Vital Signs (24h Range):  Temp:  [97.4 °F (36.3 °C)-99.1 °F (37.3 °C)] 97.6 °F (36.4 °C)  Pulse:  [66-76] 75  Resp:  [16] 16  SpO2:   [94 %-97 %] 95 %  BP: (134-149)/(61-73) 142/61     Weight: 60.4 kg (133 lb 2.5 oz)  Body mass index is 20.86 kg/m².     SpO2: 95 %  O2 Device (Oxygen Therapy): room air    No intake or output data in the 24 hours ending 12/12/20 1341    Lines/Drains/Airways     Drain                 Hemodialysis AV Fistula Left upper arm -- days          Peripheral Intravenous Line                 Peripheral IV - Single Lumen 12/09/20 1227 20 G;1 1/4 in Anterior;Right Forearm 3 days                Physical Exam   Constitutional: She is oriented to person, place, and time. She appears well-developed and well-nourished.   HENT:   Head: Normocephalic and atraumatic.   Eyes: Pupils are equal, round, and reactive to light.   Neck: Normal range of motion. Neck supple.   Cardiovascular: Normal rate and regular rhythm. Exam reveals gallop.   Pulmonary/Chest: Effort normal and breath sounds normal.   Abdominal: She exhibits distension. There is abdominal tenderness. There is no rebound and no guarding.   Musculoskeletal: Normal range of motion.   Neurological: She is alert and oriented to person, place, and time.   Skin:   Pt has continued perianal cellulitis but improved symptoms       Significant Labs:   Recent Lab Results       12/12/20  0502        Anion Gap 8     Baso # 0.00     Basophil % 0.0     BUN 13     Calcium 8.6     Chloride 112     CO2 22     Creatinine 2.1     Differential Method Automated     eGFR if African American 26.9     eGFR if non  23.3  Comment:  Calculation used to obtain the estimated glomerular filtration  rate (eGFR) is the CKD-EPI equation.        Eos # 0.0     Eosinophil % 0.0     Glucose 124     Gran # (ANC) 3.1     Gran % 83.7     Hematocrit 33.9     Hemoglobin 10.1     Immature Grans (Abs) 0.02  Comment:  Mild elevation in immature granulocytes is non specific and   can be seen in a variety of conditions including stress response,   acute inflammation, trauma and pregnancy. Correlation with  other   laboratory and clinical findings is essential.       Immature Granulocytes 0.5     Lymph # 0.5     Lymph % 14.2     Magnesium 1.4     MCH 33.1     MCHC 29.8          Mono # 0.1     Mono % 1.6     MPV 10.8     nRBC 0     Phosphorus 4.0     Platelets 143     Potassium 4.2     RBC 3.05     RDW 14.0     Sodium 142     Vancomycin, Random 9.3     WBC 3.66           Significant Imaging: I have reviewed all imaging studies    Assessment and Plan:   12/12/2020  A)  Perianal cellulitis and lymphedema  Short gut syndrome and diarrhea-unabated with questran  ESRD on hemodialysis  HTN  Negative C diff EIA  P)  Chloraseptic spray PRN throat pain    Nephrology and ID consults appreciated  Continue current therapy      12/11/2020  A)  Perianal cellulitis and lymphedema  Short gut syndrome with diarrhea  ESRD on hemodialysis  HTN  Negative C diff EIA  P)  Nephrology and ID consults appreciated  Continue present therapy  Solumedrol 40 mg IVP         12/10/2020  A)  Perianal cellulitis and lymphedema secondary to chronic diarrhea  Short gut syndrome   ESRD on hemodialysis  HTN  Hx of vulvar cancer-could she have Bowen's disease of the anus  P)  Nephrology and ID consults appreciated  Cholestyramine changed to TID  Otherwise continue present therapy     12/9/2020  A)  Perianal edema with erythema and irritation in a patient with short gut syndrome and chronic diarrhea  ESRD on hemodialysis  HTN  Hx of Vulvar cancer  P)  Rocephin 1 gram Q 24  Diflucan orally  Hydrocodone 10 po Q 6 PRN pain  ID consult appreciated  Nephrology consulted for dialysis  Increase cholestyramine to TID  No notes have been filed under this hospital service.  Service: Cardiology      VTE Risk Mitigation (From admission, onward)         Ordered     IP VTE HIGH RISK PATIENT  Once      12/09/20 0227     Place sequential compression device  Until discontinued      12/09/20 0227     Place JA hose  Until discontinued      12/09/20 0227                 Ashwin Diaz MD  Cardiology  [facility]

## 2020-12-12 NOTE — HOSPITAL COURSE
12/9/2020  Pt has a considerable amount of perianal pain. She has a short gut and chronic diarrhea. Her perianal pain was 10/10. She states that she saw Dr Neal this AM.     12/10/2020  Ms Hutchins is feeling better with less perianal pain.     12/11/2020  Pt still has some abdominal discomfort 4-6/10 with less diarrhea. Overall she is feeling better in the anal region.    12/12/2020  Pt has a sore throat and would like chloraseptic spray. My bottom still hurt but is better and the diarrhea is the same.

## 2020-12-12 NOTE — HPI
Shara Hutchins is a 70 y.o. female who  has a past medical history of Chronic kidney disease, stage III (moderate) (1/7/2018), Diabetes mellitus, type 2, Fatigue, Hemodialysis patient, Hypertension, Iron deficiency anemia due to chronic blood loss (1/7/2018), and Vulvar cancer (1/7/2019).. The patient presented to the on 12/8/2020 with a primary complaint of BUTTOCK AND VAGINAL PAIN (S/P RADIATION), COLD S/S, Chills, and Cough     She has chronic radiation changes to her vulva and perineum which are intermittently bothersome for her. She has chronic diarrhea and is often incontinent of urine and stool.   Currently, she says she has had worsening pain and erythema, which are now severe, to the area over the past couple of days and thinks she is developing an infection. She has had some chills but denies fevers.   She also c/o of a dry cough for the past few days.   Denies chest pain, sick contacts, diaphoresis, lightheadedness, syncope.   She was started on meropenem and vancomycin in the ED.

## 2020-12-12 NOTE — NURSING
HD terminated after 2.5 hours. HD system clotted. Dr. Dietz ordered to end tx for today. Total UF 1466mL. Pt tolerated fairly. Report given to Jacque Costa RN.

## 2020-12-12 NOTE — SUBJECTIVE & OBJECTIVE
Interval History: Pt has a sore throat. Her abdominal discomfort and diarrhea are unchanged but she does seem less weak.    Review of Systems   Constitution: Positive for malaise/fatigue.   HENT: Positive for sore throat.    Eyes: Negative.    Cardiovascular: Negative.    Endocrine: Negative.    Hematologic/Lymphatic: Bruises/bleeds easily.   Musculoskeletal: Positive for arthritis, muscle weakness and myalgias.   Gastrointestinal: Positive for bloating, abdominal pain and diarrhea.   Genitourinary: Negative.    Neurological: Positive for difficulty with concentration, light-headedness and weakness.   Psychiatric/Behavioral: The patient is nervous/anxious.    Allergic/Immunologic: Positive for persistent infections.     Objective:     Vital Signs (Most Recent):  Temp: 97.6 °F (36.4 °C) (12/12/20 1209)  Pulse: 75 (12/12/20 1209)  Resp: 16 (12/12/20 1209)  BP: (!) 142/61 (12/12/20 1209)  SpO2: 95 % (12/12/20 1209) Vital Signs (24h Range):  Temp:  [97.4 °F (36.3 °C)-99.1 °F (37.3 °C)] 97.6 °F (36.4 °C)  Pulse:  [66-76] 75  Resp:  [16] 16  SpO2:  [94 %-97 %] 95 %  BP: (134-149)/(61-73) 142/61     Weight: 60.4 kg (133 lb 2.5 oz)  Body mass index is 20.86 kg/m².     SpO2: 95 %  O2 Device (Oxygen Therapy): room air    No intake or output data in the 24 hours ending 12/12/20 1341    Lines/Drains/Airways     Drain                 Hemodialysis AV Fistula Left upper arm -- days          Peripheral Intravenous Line                 Peripheral IV - Single Lumen 12/09/20 1227 20 G;1 1/4 in Anterior;Right Forearm 3 days                Physical Exam   Constitutional: She is oriented to person, place, and time. She appears well-developed and well-nourished.   HENT:   Head: Normocephalic and atraumatic.   Eyes: Pupils are equal, round, and reactive to light.   Neck: Normal range of motion. Neck supple.   Cardiovascular: Normal rate and regular rhythm. Exam reveals gallop.   Pulmonary/Chest: Effort normal and breath sounds normal.    Abdominal: She exhibits distension. There is abdominal tenderness. There is no rebound and no guarding.   Musculoskeletal: Normal range of motion.   Neurological: She is alert and oriented to person, place, and time.   Skin:   Pt has continued perianal cellulitis but improved symptoms       Significant Labs:   Recent Lab Results       12/12/20  0502        Anion Gap 8     Baso # 0.00     Basophil % 0.0     BUN 13     Calcium 8.6     Chloride 112     CO2 22     Creatinine 2.1     Differential Method Automated     eGFR if African American 26.9     eGFR if non  23.3  Comment:  Calculation used to obtain the estimated glomerular filtration  rate (eGFR) is the CKD-EPI equation.        Eos # 0.0     Eosinophil % 0.0     Glucose 124     Gran # (ANC) 3.1     Gran % 83.7     Hematocrit 33.9     Hemoglobin 10.1     Immature Grans (Abs) 0.02  Comment:  Mild elevation in immature granulocytes is non specific and   can be seen in a variety of conditions including stress response,   acute inflammation, trauma and pregnancy. Correlation with other   laboratory and clinical findings is essential.       Immature Granulocytes 0.5     Lymph # 0.5     Lymph % 14.2     Magnesium 1.4     MCH 33.1     MCHC 29.8          Mono # 0.1     Mono % 1.6     MPV 10.8     nRBC 0     Phosphorus 4.0     Platelets 143     Potassium 4.2     RBC 3.05     RDW 14.0     Sodium 142     Vancomycin, Random 9.3     WBC 3.66           Significant Imaging: I have reviewed all imaging studies

## 2020-12-12 NOTE — PROGRESS NOTES
Progress Note  Infectious Disease    Admit Date: 12/8/2020   LOS: 2 days     SUBJECTIVE:  No complaints today     Follow-up For:  For forearm perineal cellulitis    INTERVAL HISTORY:  She has a history of gynecological cancer and received radiation 2013 followed by chemotherapy.  Since then she has had recurrent vulvar perineal cellulitis.  Has about 3-4 episodes every year.  Last major hospitalization was in March through May 2020 by her history.  Also has ESRD and is on hemodialysis.  Also history of chronic diarrhea with 10-12 bowel motions daily secondary to short bowel syndrome and chemotherapy side effects.     She was admitted on 12/09/2020 and is currently on therapy for cellulitis.  Leukocytosis has resolved.  Edema appears to be slowly resolving.  urine cultur grew group C Streptococcus and E coli.  She is currently on vancomycin and ceftriaxone.  Also on prophylactic fluconazole for vaginal candidiasis.    Antibiotics (From admission, onward)    Start     Stop Route Frequency Ordered    12/09/20 2100  mupirocin 2 % ointment      12/14 2059 Nasl 2 times daily 12/09/20 1319    12/09/20 1000  cefTRIAXone (ROCEPHIN) 1 g/50 mL D5W IVPB      -- IV Every 24 hours (non-standard times) 12/09/20 0900    12/09/20 0233  vancomycin - pharmacy to dose  (vancomycin IVPB)      -- IV pharmacy to manage frequency 12/09/20 0134          Antifungals (From admission, onward)    Start     Stop Route Frequency Ordered    12/09/20 0900  fluconazole tablet 100 mg      -- Oral Daily 12/09/20 0857           Antivirals (From admission, onward)    None          Review of Systems:  HEENT: No change in vision, sore throat, ulcers, thrush, dysphagia  Heart: No chest pain, orthopnea or edema  Lungs: No shortness of breath, cough, sputum production or pleuritic pain  Abdomen: No nausea, vomiting, diarrhea, abdominal pain, appetite is good   Extremities: No  cyanosis, edema, joint swelling or new pain  MSK: no new pain, swelling,  ambulates  Neurological: No headaches, focal weakness,   Psych: calm, appropriate  Skin: No rash, itching, or erythema  VAD: No IV site issues    OBJECTIVE:     Vital Signs (Most Recent)  Temp: 97.7 °F (36.5 °C) (12/12/20 0735)  Pulse: 68 (12/12/20 0735)  Resp: 16 (12/12/20 0822)  BP: 134/73 (12/12/20 0735)  SpO2: (!) 94 % (12/12/20 0735)    Temperature Range Min/Max (Last 24H):  Temp:  [97.4 °F (36.3 °C)-99.1 °F (37.3 °C)]     I & O (Last 24H):    Intake/Output Summary (Last 24 hours) at 12/12/2020 1022  Last data filed at 12/11/2020 1300  Gross per 24 hour   Intake 700 ml   Output 2166 ml   Net -1466 ml       Physical Exam:  General:  In NAD. Alert and attentive, cooperative, comfortable  Eyes:  Anicteric, PERRL, EOMI  ENT:  No ulcers, exudates, thrush, nares patent, dentition is  Neck:   supple, no adenopathy appreciated  Lungs: Clear, no consolidation, rales, wheezes, rub  Heart:  RRR, no gallop/murmur noted  Abd:  Soft, NT, ND, normal BS, no masses/organomegaly appreciated.  :  Edematous mound bovis, for over and labia.  Also indurated bilateral inner thighs that extends posteriorly through the perineum.  Musc:  Joints without effusion, swelling,  erythema, synovitis, ambulatory  Skin:  No rashes. No palmar or plantar lesions. No subungual petechiae   Neuro: Alert, attentive, speech fluent, face symmetric, moves all extremities, no focal weakness   Psych: Calm, cooperative  Extrem: No edema, erythema, phlebitis, cellulitis, warm and well perfused  VAD:  Isolation:  None      Laboratory:    Recent Labs   Lab 12/10/20  0617 12/11/20  0511 12/12/20  0502   WBC 5.17 5.14 3.66*   HGB 10.0* 9.5* 10.1*   HCT 33.7* 32.1* 33.9*   * 138* 143*       Recent Labs   Lab 12/08/20  1833  12/10/20  0617 12/11/20  0511 12/12/20  0502      < > 141 140 142   K 3.3*   < > 4.0 4.3 4.2      < > 112* 114* 112*   CO2 24   < > 23 19* 22*   BUN 24*   < > 13 17 13   CREATININE 2.4*   < > 2.1* 2.7* 2.1*   CALCIUM 8.8    < > 8.3* 7.6* 8.6*   PROT 6.4  --   --   --   --    BILITOT 0.9  --   --   --   --    ALKPHOS 135  --   --   --   --    ALT 18  --   --   --   --    AST 18  --   --   --   --     < > = values in this interval not displayed.     Recent Labs   Lab 12/08/20 1833   CRP 0.46       Microbiology Results (last 7 days)     Procedure Component Value Units Date/Time    Blood culture x two cultures. Draw prior to antibiotics. [[id number]] Collected: 12/08/20 1830    Order Status: Completed Specimen: Blood from Peripheral, Antecubital, Right Updated: 12/11/20 2032     Blood Culture, Routine No Growth to date      No Growth to date      No Growth to date      No Growth to date    Narrative:      Aerobic and anaerobic    Urine culture [[id number]]  (Abnormal)  (Susceptibility) Collected: 12/08/20 2053    Order Status: Completed Specimen: Urine from Clean Catch Updated: 12/11/20 1322     Urine Culture, Routine ESCHERICHIA COLI  >100,000 cfu/ml        STREPTOCOCCUS GROUP G  > 100,000 cfu/ml  Presumptive ID  Beta-hemolytic streptococci are routinely susceptible to   penicillins,cephalosporins and carbapenems.      Urine culture [[id number]] Collected: 12/09/20 2323    Order Status: Completed Specimen: Urine Updated: 12/11/20 0857     Urine Culture, Routine Multiple organisms isolated. None in predominance.  Repeat if      clinically necessary.    Narrative:      Specimen Source->Urine    Blood culture x two cultures. Draw prior to antibiotics. [[id number]]  (Abnormal) Collected: 12/08/20 2006    Order Status: Completed Specimen: Blood from Peripheral, Wrist, Right Updated: 12/11/20 0827     Blood Culture, Routine Gram stain aer bottle: Gram positive cocci in clusters resembling Staph      Results called to and read back by: Keri Blount on 1 west by ANA LAURA      12/10/2020  10:53      MICROCOCCUS SPECIES  Organism is a probable contaminant      Narrative:      Aerobic and anaerobic    Clostridium difficile EIA [[id number]] Collected:  12/09/20 2216    Order Status: Completed Specimen: Stool Updated: 12/09/20 2317     C. diff Antigen Negative     C difficile Toxins A+B, EIA Negative     Comment: Testing not recommended for children <24 months old.       Culture, Respiratory with Gram Stain [[id number]]     Order Status: Canceled Specimen: Respiratory from Sputum, Expectorated     Urine Culture High Risk [[id number]]     Order Status: Completed Specimen: Urine, Clean Catch           Diagnostic Results: Reviewed    Cardiology:    ASSESSMENT/PLAN:   1.  Vulvar and perineal cellulitis.  This is a recurrent infection.  She has a history of penicillin allergy that occurred when she was 3 years old.  She describes it as being severe.  She has not tried penicillin since then.  Will discontinue vancomycin and try linezolid p.o..  Check ASO titer.  Consideration may be given secondary prophylaxis with a penicillin based regimen in the future.  Will require penicillin allergy testing outpatient and desensitization if necessary.    2.  ESRD on HD    3.  Chronic diarrhea secondary to short gut syndrome    4.  Bacteriuria.  On ceftriaxone for this indication given complexity of her presentation.    ID service will continue to follow with you while in the hospital.  Please call with any questions.

## 2020-12-12 NOTE — PROGRESS NOTES
Pharmacokinetic Assessment Follow Up: IV Vancomycin    Vancomycin serum concentration assessment(s):    The random level was drawn correctly and can be used to guide therapy at this time. The measurement is below the desired definitive target range of 10 to 15 mcg/mL.    Vancomycin Regimen Plan:    Vancomycin 1000 mg once at 0800   Re-dose when the random level is less than 15 mcg/mL, next level to be drawn at 0430 on 12/13    Drug levels (last 3 results):  Recent Labs   Lab Result Units 12/10/20  0617 12/12/20  0502   Vancomycin, Random ug/mL 7.0 9.3       Pharmacy will continue to follow and monitor vancomycin.    Please contact pharmacy at extension [phone] for questions regarding this assessment.    Thank you for the consult,   Otoniel Khan       Patient brief summary:  Shara Hutchins is a 70 y.o. female initiated on antimicrobial therapy with IV Vancomycin for treatment of skin & soft tissue infection    The patient's current regimen is Intermittent Vancomycin Dosing    Drug Allergies:   Review of patient's allergies indicates:   Allergen Reactions    Ciprofloxacin Other (See Comments)     Elevated liver enzymes      Pcn [penicillins] Anaphylaxis     TOLERATES ROCEPHIN WITHOUT DIFFICULTY       Actual Body Weight:   60.4 kg    Renal Function:   Estimated Creatinine Clearance: 23.8 mL/min (A) (based on SCr of 2.1 mg/dL (H)).,     CBC (last 72 hours):  Recent Labs   Lab Result Units 12/10/20  0617 12/11/20  0511 12/12/20  0502   WBC K/uL 5.17 5.14 3.66*   Hemoglobin g/dL 10.0* 9.5* 10.1*   Hematocrit % 33.7* 32.1* 33.9*   Platelets K/uL 128* 138* 143*   Gran % % 71.2 67.5 83.7*   Lymph % % 18.0 19.1 14.2*   Mono % % 7.7 9.5 1.6*   Eosinophil % % 2.7 3.1 0.0   Basophil % % 0.2 0.2 0.0   Differential Method  Automated Automated Automated       Metabolic Panel (last 72 hours):  Recent Labs   Lab Result Units 12/09/20  2323 12/10/20  0617 12/11/20  0511 12/12/20  0502   Sodium mmol/L  --  141 140 142   Potassium mmol/L  --   4.0 4.3 4.2   Chloride mmol/L  --  112* 114* 112*   CO2 mmol/L  --  23 19* 22*   Glucose mg/dL  --  82 74 124*   Glucose, UA  Negative  --   --   --    BUN mg/dL  --  13 17 13   Creatinine mg/dL  --  2.1* 2.7* 2.1*   Magnesium mg/dL  --  1.6 1.5* 1.4*       Vancomycin Administrations:  vancomycin given in the last 96 hours                     vancomycin in dextrose 5 % 1 gram/250 mL IVPB 1,000 mg (mg) 1,000 mg New Bag 12/10/20 1027    vancomycin in dextrose 5 % 1 gram/250 mL IVPB 1,000 mg (mg) 1,000 mg New Bag 12/08/20 3738                    Microbiologic Results:  Microbiology Results (last 7 days)       Procedure Component Value Units Date/Time    Blood culture x two cultures. Draw prior to antibiotics. [[id number]] Collected: 12/08/20 1830    Order Status: Completed Specimen: Blood from Peripheral, Antecubital, Right Updated: 12/11/20 2032     Blood Culture, Routine No Growth to date      No Growth to date      No Growth to date      No Growth to date    Narrative:      Aerobic and anaerobic    Urine culture [[id number]]  (Abnormal)  (Susceptibility) Collected: 12/08/20 2053    Order Status: Completed Specimen: Urine from Clean Catch Updated: 12/11/20 1322     Urine Culture, Routine ESCHERICHIA COLI  >100,000 cfu/ml        STREPTOCOCCUS GROUP G  > 100,000 cfu/ml  Presumptive ID  Beta-hemolytic streptococci are routinely susceptible to   penicillins,cephalosporins and carbapenems.      Urine culture [[id number]] Collected: 12/09/20 2323    Order Status: Completed Specimen: Urine Updated: 12/11/20 0857     Urine Culture, Routine Multiple organisms isolated. None in predominance.  Repeat if      clinically necessary.    Narrative:      Specimen Source->Urine    Blood culture x two cultures. Draw prior to antibiotics. [[id number]]  (Abnormal) Collected: 12/08/20 2006    Order Status: Completed Specimen: Blood from Peripheral, Wrist, Right Updated: 12/11/20 0827     Blood Culture, Routine Gram stain aer bottle: Gram  positive cocci in clusters resembling Staph      Results called to and read back by: Keri Blount on 1 west by ANA LAURA      12/10/2020  10:53      MICROCOCCUS SPECIES  Organism is a probable contaminant      Narrative:      Aerobic and anaerobic    Clostridium difficile EIA [[id number]] Collected: 12/09/20 2216    Order Status: Completed Specimen: Stool Updated: 12/09/20 2317     C. diff Antigen Negative     C difficile Toxins A+B, EIA Negative     Comment: Testing not recommended for children <24 months old.       Culture, Respiratory with Gram Stain [[id number]]     Order Status: Canceled Specimen: Respiratory from Sputum, Expectorated     Urine Culture High Risk [[id number]]     Order Status: Completed Specimen: Urine, Clean Catch

## 2020-12-13 LAB
ANION GAP SERPL CALC-SCNC: 4 MMOL/L (ref 8–16)
BACTERIA BLD CULT: NORMAL
BASOPHILS # BLD AUTO: 0.02 K/UL (ref 0–0.2)
BASOPHILS NFR BLD: 0.4 % (ref 0–1.9)
BUN SERPL-MCNC: 21 MG/DL (ref 8–23)
CALCIUM SERPL-MCNC: 7.9 MG/DL (ref 8.7–10.5)
CHLORIDE SERPL-SCNC: 113 MMOL/L (ref 95–110)
CO2 SERPL-SCNC: 23 MMOL/L (ref 23–29)
CREAT SERPL-MCNC: 2.5 MG/DL (ref 0.5–1.4)
DIFFERENTIAL METHOD: ABNORMAL
EOSINOPHIL # BLD AUTO: 0.1 K/UL (ref 0–0.5)
EOSINOPHIL NFR BLD: 2.2 % (ref 0–8)
ERYTHROCYTE [DISTWIDTH] IN BLOOD BY AUTOMATED COUNT: 14.3 % (ref 11.5–14.5)
EST. GFR  (AFRICAN AMERICAN): 21.8 ML/MIN/1.73 M^2
EST. GFR  (NON AFRICAN AMERICAN): 18.9 ML/MIN/1.73 M^2
GLUCOSE SERPL-MCNC: 75 MG/DL (ref 70–110)
HCT VFR BLD AUTO: 33.1 % (ref 37–48.5)
HGB BLD-MCNC: 9.8 G/DL (ref 12–16)
IMM GRANULOCYTES # BLD AUTO: 0.02 K/UL (ref 0–0.04)
IMM GRANULOCYTES NFR BLD AUTO: 0.4 % (ref 0–0.5)
LYMPHOCYTES # BLD AUTO: 1.4 K/UL (ref 1–4.8)
LYMPHOCYTES NFR BLD: 25.5 % (ref 18–48)
MAGNESIUM SERPL-MCNC: 1.9 MG/DL (ref 1.6–2.6)
MCH RBC QN AUTO: 33.7 PG (ref 27–31)
MCHC RBC AUTO-ENTMCNC: 29.6 G/DL (ref 32–36)
MCV RBC AUTO: 114 FL (ref 82–98)
MONOCYTES # BLD AUTO: 0.4 K/UL (ref 0.3–1)
MONOCYTES NFR BLD: 7.2 % (ref 4–15)
NEUTROPHILS # BLD AUTO: 3.6 K/UL (ref 1.8–7.7)
NEUTROPHILS NFR BLD: 64.3 % (ref 38–73)
NRBC BLD-RTO: 0 /100 WBC
PLATELET # BLD AUTO: 149 K/UL (ref 150–350)
PMV BLD AUTO: 10.5 FL (ref 9.2–12.9)
POTASSIUM SERPL-SCNC: 4.3 MMOL/L (ref 3.5–5.1)
RBC # BLD AUTO: 2.91 M/UL (ref 4–5.4)
SODIUM SERPL-SCNC: 140 MMOL/L (ref 136–145)
WBC # BLD AUTO: 5.57 K/UL (ref 3.9–12.7)

## 2020-12-13 PROCEDURE — 25000003 PHARM REV CODE 250: Performed by: INTERNAL MEDICINE

## 2020-12-13 PROCEDURE — 30000890 LABCORP MISCELLANEOUS TEST

## 2020-12-13 PROCEDURE — 36415 COLL VENOUS BLD VENIPUNCTURE: CPT

## 2020-12-13 PROCEDURE — 63600175 PHARM REV CODE 636 W HCPCS: Performed by: INTERNAL MEDICINE

## 2020-12-13 PROCEDURE — 12000002 HC ACUTE/MED SURGE SEMI-PRIVATE ROOM

## 2020-12-13 PROCEDURE — 85025 COMPLETE CBC W/AUTO DIFF WBC: CPT

## 2020-12-13 PROCEDURE — 80048 BASIC METABOLIC PNL TOTAL CA: CPT

## 2020-12-13 PROCEDURE — 86060 ANTISTREPTOLYSIN O TITER: CPT

## 2020-12-13 PROCEDURE — 83735 ASSAY OF MAGNESIUM: CPT

## 2020-12-13 PROCEDURE — 63700000 PHARM REV CODE 250 ALT 637 W/O HCPCS: Performed by: INTERNAL MEDICINE

## 2020-12-13 RX ORDER — LINEZOLID 600 MG/1
600 TABLET, FILM COATED ORAL EVERY 12 HOURS
Status: DISCONTINUED | OUTPATIENT
Start: 2020-12-13 | End: 2020-12-15 | Stop reason: HOSPADM

## 2020-12-13 RX ORDER — FUROSEMIDE 10 MG/ML
80 INJECTION INTRAMUSCULAR; INTRAVENOUS ONCE
Status: DISCONTINUED | OUTPATIENT
Start: 2020-12-13 | End: 2020-12-15 | Stop reason: HOSPADM

## 2020-12-13 RX ORDER — SERTRALINE HYDROCHLORIDE 50 MG/1
50 TABLET, FILM COATED ORAL NIGHTLY
Status: DISCONTINUED | OUTPATIENT
Start: 2020-12-13 | End: 2020-12-15 | Stop reason: HOSPADM

## 2020-12-13 RX ORDER — FUROSEMIDE 40 MG/1
80 TABLET ORAL ONCE
Status: COMPLETED | OUTPATIENT
Start: 2020-12-13 | End: 2020-12-13

## 2020-12-13 RX ADMIN — MUPIROCIN: 20 OINTMENT TOPICAL at 09:12

## 2020-12-13 RX ADMIN — DILTIAZEM HYDROCHLORIDE 120 MG: 120 CAPSULE, COATED, EXTENDED RELEASE ORAL at 09:12

## 2020-12-13 RX ADMIN — HYDROCODONE BITARTRATE AND ACETAMINOPHEN 1 TABLET: 10; 325 TABLET ORAL at 02:12

## 2020-12-13 RX ADMIN — SODIUM BICARBONATE 650 MG TABLET 650 MG: at 09:12

## 2020-12-13 RX ADMIN — CHOLESTYRAMINE 4 G: 4 POWDER, FOR SUSPENSION ORAL at 05:12

## 2020-12-13 RX ADMIN — CHOLESTYRAMINE 4 G: 4 POWDER, FOR SUSPENSION ORAL at 09:12

## 2020-12-13 RX ADMIN — FUROSEMIDE 80 MG: 40 TABLET ORAL at 04:12

## 2020-12-13 RX ADMIN — SERTRALINE HYDROCHLORIDE 50 MG: 50 TABLET ORAL at 09:12

## 2020-12-13 RX ADMIN — CHOLESTYRAMINE 4 G: 4 POWDER, FOR SUSPENSION ORAL at 01:12

## 2020-12-13 RX ADMIN — CEFTRIAXONE SODIUM 1 G: 1 INJECTION, POWDER, FOR SOLUTION INTRAMUSCULAR; INTRAVENOUS at 09:12

## 2020-12-13 RX ADMIN — LIDOCAINE HYDROCHLORIDE: 20 JELLY TOPICAL at 09:12

## 2020-12-13 RX ADMIN — LEVOTHYROXINE SODIUM 88 MCG: 88 TABLET ORAL at 04:12

## 2020-12-13 RX ADMIN — LINEZOLID 600 MG: 600 TABLET, FILM COATED ORAL at 09:12

## 2020-12-13 RX ADMIN — HYDROCODONE BITARTRATE AND ACETAMINOPHEN 1 TABLET: 10; 325 TABLET ORAL at 04:12

## 2020-12-13 NOTE — PROGRESS NOTES
[facility] Medicine  Progress Note    Patient Name: Shara Hutchins  MRN: [id number]  Patient Class: IP- Inpatient   Admission Date: 12/8/2020  Length of Stay: 3 days  Attending Physician: Ashwin Diaz MD  Primary Care Provider: April Horton MD        Subjective:     Principal Problem:Perineal rash, female        HPI:  History of Present Illness / Brief Hospital Course:  Shara Hutchins is a 70 y.o. female who  has a past medical history of Chronic kidney disease, stage III (moderate) (1/7/2018), Diabetes mellitus, type 2, Fatigue, Hemodialysis patient, Hypertension, Iron deficiency anemia due to chronic blood loss (1/7/2018), and Vulvar cancer (1/7/2019).. The patient presented to the on 12/8/2020 with a primary complaint of BUTTOCK AND VAGINAL PAIN (S/P RADIATION), COLD S/S, Chills, and Cough     She has chronic radiation changes to her vulva and perineum which are intermittently bothersome for her. She has chronic diarrhea and is often incontinent of urine and stool.   Currently, she says she has had worsening pain and erythema, which are now severe, to the area over the past couple of days and thinks she is developing an infection. She has had some chills but denies fevers.   She also c/o of a dry cough for the past few days.   Denies chest pain, sick contacts, diaphoresis, lightheadedness, syncope.   She was started on meropenem and vancomycin in the ED.     Overview/Hospital Course:  12/9/2020  Pt has a considerable amount of perianal pain. She has a short gut and chronic diarrhea. Her perianal pain was 10/10. She states that she saw Dr Neal this AM.    12/10/2020  Ms Hutchins is feeling better with less perianal pain.    12/11/2020  Pt still has some abdominal discomfort 4-6/10 with less diarrhea. Overall she is feeling better in the anal region.    12/12/2020  Pt has a sore throat and would like chloraseptic spray. My bottom still hurt but is better and the diarrhea is the  same.    12/13/2020  Pt did not tolerate linazolide due to lower extremity swelling after the dose. She would like to restart her Zoloft       Interval History: Ms Hutchins did not tolerate linazolide as it caused leg swelling    Review of Systems   Constitutional: Positive for appetite change and fatigue.   HENT: Negative.    Eyes: Negative.    Respiratory: Negative.    Cardiovascular: Negative.    Gastrointestinal: Positive for abdominal distention, abdominal pain and diarrhea.   Endocrine: Positive for cold intolerance and heat intolerance.   Genitourinary: Negative.    Musculoskeletal: Positive for arthralgias and myalgias.   Skin: Positive for wound.        perianal   Allergic/Immunologic: Positive for immunocompromised state.   Neurological: Positive for weakness.   Hematological: Bruises/bleeds easily.   Psychiatric/Behavioral: Positive for decreased concentration, dysphoric mood and sleep disturbance. The patient is nervous/anxious.      Objective:     Vital Signs (Most Recent):  Temp: 97.8 °F (36.6 °C) (12/13/20 1152)  Pulse: 69 (12/13/20 1152)  Resp: 17 (12/13/20 1152)  BP: (!) 162/67 (12/13/20 1152)  SpO2: 98 % (12/13/20 1152) Vital Signs (24h Range):  Temp:  [97.7 °F (36.5 °C)-98.5 °F (36.9 °C)] 97.8 °F (36.6 °C)  Pulse:  [58-77] 69  Resp:  [16-20] 17  SpO2:  [95 %-98 %] 98 %  BP: (123-162)/(62-73) 162/67     Weight: 60.4 kg (133 lb 2.5 oz)  Body mass index is 20.86 kg/m².    Intake/Output Summary (Last 24 hours) at 12/13/2020 1227  Last data filed at 12/12/2020 1548  Gross per 24 hour   Intake 0 ml   Output --   Net 0 ml      Physical Exam  Vitals signs and nursing note reviewed. Exam conducted with a chaperone present.   Constitutional:       Appearance: Normal appearance.   HENT:      Head: Normocephalic and atraumatic.      Nose: Nose normal.      Mouth/Throat:      Mouth: Mucous membranes are moist.   Eyes:      Pupils: Pupils are equal, round, and reactive to light.   Neck:      Musculoskeletal: Normal  range of motion and neck supple.   Cardiovascular:      Rate and Rhythm: Normal rate and regular rhythm.   Pulmonary:      Effort: Pulmonary effort is normal.      Breath sounds: Normal breath sounds.   Abdominal:      General: There is distension.      Tenderness: There is abdominal tenderness.      Comments: reduced   Musculoskeletal: Normal range of motion.   Skin:     General: Skin is warm.      Comments: Alla anal lesion improving     Neurological:      General: No focal deficit present.      Mental Status: She is alert and oriented to person, place, and time.   Psychiatric:      Comments: Somewhat depressed affect         Significant Labs:   Recent Lab Results       12/13/20  0643   12/13/20  0502        Anion Gap 4       Baso # 0.02       Basophil % 0.4       BUN 21       Calcium 7.9       Chloride 113       CO2 23       Creatinine 2.5       Differential Method Automated       eGFR if African American 21.8       eGFR if non  18.9  Comment:  Calculation used to obtain the estimated glomerular filtration  rate (eGFR) is the CKD-EPI equation.          Eos # 0.1       Eosinophil % 2.2       Glucose 75       Gran # (ANC) 3.6       Gran % 64.3       Hematocrit 33.1       Hemoglobin 9.8       Immature Grans (Abs) 0.02  Comment:  Mild elevation in immature granulocytes is non specific and   can be seen in a variety of conditions including stress response,   acute inflammation, trauma and pregnancy. Correlation with other   laboratory and clinical findings is essential.         Immature Granulocytes 0.4       [facility] Test Code:   450433     [facility] Test Name:   Antistreptolysin O Ab     Lymph # 1.4       Lymph % 25.5       Magnesium 1.9       MCH 33.7       MCHC 29.6              Mono # 0.4       Mono % 7.2       MPV 10.5       nRBC 0       Platelets 149       Potassium 4.3       RBC 2.91       RDW 14.3       Sodium 140       WBC 5.57             Significant Imaging: I have reviewed all  pertinent imaging results/findings within the past 24 hours.      Assessment/Plan:      12/13/2020  A)  Perianal cellulitis with lymphedema with symptoms resolving slowly  Did not tolerate linazolide  Short gut syndrome with diarrhea contributing to the above  ESRD on hemodialysis  P)  D/C linazolide at pt request  Nephrology and ID consults appreciated      12/12/2020  A)  Perianal cellulitis and lymphedema  Short gut syndrome and diarrhea-unabated with questran  ESRD on hemodialysis  HTN  Negative C diff EIA  P)  Chloraseptic spray PRN throat pain    Nephrology and ID consults appreciated  Continue current therapy        12/11/2020  A)  Perianal cellulitis and lymphedema  Short gut syndrome with diarrhea  ESRD on hemodialysis  HTN  Negative C diff EIA  P)  Nephrology and ID consults appreciated  Continue present therapy  Solumedrol 40 mg IVP         12/10/2020  A)  Perianal cellulitis and lymphedema secondary to chronic diarrhea  Short gut syndrome   ESRD on hemodialysis  HTN  Hx of vulvar cancer-could she have Bowen's disease of the anus  P)  Nephrology and ID consults appreciated  Cholestyramine changed to TID  Otherwise continue present therapy     12/9/2020  A)  Perianal edema with erythema and irritation in a patient with short gut syndrome and chronic diarrhea  ESRD on hemodialysis  HTN  Hx of Vulvar cancer  P)  Rocephin 1 gram Q 24  Diflucan orally  Hydrocodone 10 po Q 6 PRN pain  ID consult appreciated  Nephrology consulted for dialysis  Increase cholestyramine to TID  No notes have been filed under this hospital service.  Service: Hospital Medicine    VTE Risk Mitigation (From admission, onward)         Ordered     IP VTE HIGH RISK PATIENT  Once      12/09/20 0227     Place sequential compression device  Until discontinued      12/09/20 0227     Place JA hose  Until discontinued      12/09/20 0227                Discharge Planning   RODRIGO:      Code Status: Full Code   Is the patient medically ready for  discharge?:     Reason for patient still in hospital (select all that apply): Treatment and Consult recommendations  Discharge Plan A: Home with family                  Ashwin Diaz MD  Department of Hospital Medicine   [facility]

## 2020-12-13 NOTE — PROGRESS NOTES
Progress Note  Infectious Disease    Admit Date: 12/8/2020   LOS: 3 days     SUBJECTIVE:  No complaints today     Follow-up For:  For forearm perineal cellulitis    INTERVAL HISTORY:  She has a history of gynecological cancer and received radiation 2013 followed by chemotherapy.  Since then she has had recurrent vulvar perineal cellulitis.  Has about 3-4 episodes every year.  Last major hospitalization was in March through May 2020 by her history.  Also has ESRD and is on hemodialysis.  Also history of chronic diarrhea with 10-12 bowel motions daily secondary to short bowel syndrome and chemotherapy side effects.     She was admitted on 12/09/2020 and is currently on therapy for cellulitis.  Leukocytosis has resolved.  Edema appears to be slowly resolving.  urine cultur grew group C Streptococcus and E coli.  She is currently on vancomycin and ceftriaxone.  Also on prophylactic fluconazole for vaginal candidiasis.    12/13/2020:  She had bilateral leg swelling, left upper arm swelling difficulty breathing yesterday after receiving Versed IV dose of linezolid.  Had also received 2 other IV medications.  There was no rash.  She states the leg swelling is better today after she urinated quite a bit last night.  Informs me that it usually is no need to pull a lot of volume from hot during dialysis.  She had refused a 2nd dose of linezolid pending discussion with me.    Antibiotics (From admission, onward)    Start     Stop Route Frequency Ordered    12/12/20 1430  linezolid 600 mg/300 mL IVPB 600 mg      -- IV Every 12 hours (non-standard times) 12/12/20 1339    12/09/20 2100  mupirocin 2 % ointment      12/14 2059 Nasl 2 times daily 12/09/20 1319    12/09/20 1000  cefTRIAXone (ROCEPHIN) 1 g/50 mL D5W IVPB      -- IV Every 24 hours (non-standard times) 12/09/20 0900          Antifungals (From admission, onward)    Start     Stop Route Frequency Ordered    12/09/20 0900  fluconazole tablet 100 mg      -- Oral Daily  12/09/20 0857           Antivirals (From admission, onward)    None          Review of Systems:  HEENT: No change in vision, sore throat, ulcers, thrush, dysphagia  Heart: No chest pain, orthopnea or edema  Lungs: No shortness of breath, cough, sputum production or pleuritic pain  Abdomen: No nausea, vomiting, diarrhea, abdominal pain, appetite is good   Extremities: No  cyanosis, edema, joint swelling or new pain  MSK: no new pain, swelling, ambulates  Neurological: No headaches, focal weakness,   Psych: calm, appropriate  Skin: No rash, itching, or erythema  VAD: No IV site issues    OBJECTIVE:     Vital Signs (Most Recent)  Temp: 97.8 °F (36.6 °C) (12/13/20 1152)  Pulse: 69 (12/13/20 1152)  Resp: 17 (12/13/20 1152)  BP: (!) 162/67 (12/13/20 1152)  SpO2: 98 % (12/13/20 1152)    Temperature Range Min/Max (Last 24H):  Temp:  [97.7 °F (36.5 °C)-98.5 °F (36.9 °C)]     I & O (Last 24H):    Intake/Output Summary (Last 24 hours) at 12/13/2020 1307  Last data filed at 12/12/2020 1548  Gross per 24 hour   Intake 0 ml   Output --   Net 0 ml       Physical Exam:  General:  In NAD. Alert and attentive, cooperative, comfortable  Eyes:  Anicteric, PERRL, EOMI  ENT:  No ulcers, exudates, thrush, nares patent, dentition is  Neck:   supple, no adenopathy appreciated  Lungs: Crackles up to mid zone bilaterally.  Heart:  RRR, no gallop/murmur noted  Abd:  Soft, NT, ND, normal BS, no masses/organomegaly appreciated.  :  Edematous mound bovis, for over and labia.  Also indurated bilateral inner thighs that extends posteriorly through the perineum.  Musc:  Joints without effusion, swelling,  erythema, synovitis, ambulatory  Skin:  No rashes. No palmar or plantar lesions. No subungual petechiae   Neuro: Alert, attentive, speech fluent, face symmetric, moves all extremities, no focal weakness   Psych: Calm, cooperative  Extrem: Bilateral lower leg edema, no erythema, phlebitis, cellulitis, warm and well perfused  VAD:  Isolation:   None      Laboratory:    Recent Labs   Lab 12/11/20  0511 12/12/20  0502 12/13/20  0643   WBC 5.14 3.66* 5.57   HGB 9.5* 10.1* 9.8*   HCT 32.1* 33.9* 33.1*   * 143* 149*       Recent Labs   Lab 12/08/20  1833  12/11/20  0511 12/12/20  0502 12/13/20  0643      < > 140 142 140   K 3.3*   < > 4.3 4.2 4.3      < > 114* 112* 113*   CO2 24   < > 19* 22* 23   BUN 24*   < > 17 13 21   CREATININE 2.4*   < > 2.7* 2.1* 2.5*   CALCIUM 8.8   < > 7.6* 8.6* 7.9*   PROT 6.4  --   --   --   --    BILITOT 0.9  --   --   --   --    ALKPHOS 135  --   --   --   --    ALT 18  --   --   --   --    AST 18  --   --   --   --     < > = values in this interval not displayed.     Recent Labs   Lab 12/08/20  1833   CRP 0.46       Microbiology Results (last 7 days)     Procedure Component Value Units Date/Time    Blood culture x two cultures. Draw prior to antibiotics. [[id number]] Collected: 12/08/20 1830    Order Status: Completed Specimen: Blood from Peripheral, Antecubital, Right Updated: 12/12/20 2032     Blood Culture, Routine No Growth to date      No Growth to date      No Growth to date      No Growth to date      No Growth to date    Narrative:      Aerobic and anaerobic    Urine culture [[id number]]  (Abnormal)  (Susceptibility) Collected: 12/08/20 2053    Order Status: Completed Specimen: Urine from Clean Catch Updated: 12/11/20 1322     Urine Culture, Routine ESCHERICHIA COLI  >100,000 cfu/ml        STREPTOCOCCUS GROUP G  > 100,000 cfu/ml  Presumptive ID  Beta-hemolytic streptococci are routinely susceptible to   penicillins,cephalosporins and carbapenems.      Urine culture [[id number]] Collected: 12/09/20 2323    Order Status: Completed Specimen: Urine Updated: 12/11/20 0857     Urine Culture, Routine Multiple organisms isolated. None in predominance.  Repeat if      clinically necessary.    Narrative:      Specimen Source->Urine    Blood culture x two cultures. Draw prior to antibiotics. [[id number]]  (Abnormal)  Collected: 12/08/20 2006    Order Status: Completed Specimen: Blood from Peripheral, Wrist, Right Updated: 12/11/20 0827     Blood Culture, Routine Gram stain aer bottle: Gram positive cocci in clusters resembling Staph      Results called to and read back by: Keri Blount on [address] by ANA LAURA      12/10/2020  10:53      MICROCOCCUS SPECIES  Organism is a probable contaminant      Narrative:      Aerobic and anaerobic    Clostridium difficile EIA [[id number]] Collected: 12/09/20 2216    Order Status: Completed Specimen: Stool Updated: 12/09/20 2317     C. diff Antigen Negative     C difficile Toxins A+B, EIA Negative     Comment: Testing not recommended for children <24 months old.       Culture, Respiratory with Gram Stain [[id number]]     Order Status: Canceled Specimen: Respiratory from Sputum, Expectorated     Urine Culture High Risk [[id number]]     Order Status: Completed Specimen: Urine, Clean Catch           Diagnostic Results: Reviewed    Cardiology:    ASSESSMENT/PLAN:   1.  Vulvar and perineal cellulitis.  This is a recurrent infection.  She has a history of penicillin allergy that occurred when she was 3 years old.  She describes it as being severe.  She has not tried penicillin since then.  Will discontinue vancomycin and try linezolid p.o..  Check ASO titer.  Consideration may be given secondary prophylaxis with a penicillin based regimen in the future.  Will require penicillin allergy testing outpatient and desensitization if necessary.    2.  ESRD on HD    3.  Chronic diarrhea secondary to short gut syndrome    4.  Bacteriuria.  On ceftriaxone for this indication given complexity of her presentation.    5.  Fluid overload.  Think this is related to the follow up of IV medications she had received including linezolid, ceftriaxone and magnesium.  Fluid management as per renal service.  Will switch linezolid to p.o today.  And also switch ceftriaxone to Kayy a p.o. regimen a or not antibiotic like Fortaz and  gentamicin can be given with dialysis.  Will defer this to Dr. Neal who will assume ID Service tomorrow 12/14/2020.    ID service will continue to follow with you while in the hospital.  Please call with questions.

## 2020-12-13 NOTE — SUBJECTIVE & OBJECTIVE
Interval History: Ms Hutchins did not tolerate linazolide as it caused leg swelling    Review of Systems   Constitutional: Positive for appetite change and fatigue.   HENT: Negative.    Eyes: Negative.    Respiratory: Negative.    Cardiovascular: Negative.    Gastrointestinal: Positive for abdominal distention, abdominal pain and diarrhea.   Endocrine: Positive for cold intolerance and heat intolerance.   Genitourinary: Negative.    Musculoskeletal: Positive for arthralgias and myalgias.   Skin: Positive for wound.        perianal   Allergic/Immunologic: Positive for immunocompromised state.   Neurological: Positive for weakness.   Hematological: Bruises/bleeds easily.   Psychiatric/Behavioral: Positive for decreased concentration, dysphoric mood and sleep disturbance. The patient is nervous/anxious.      Objective:     Vital Signs (Most Recent):  Temp: 97.8 °F (36.6 °C) (12/13/20 1152)  Pulse: 69 (12/13/20 1152)  Resp: 17 (12/13/20 1152)  BP: (!) 162/67 (12/13/20 1152)  SpO2: 98 % (12/13/20 1152) Vital Signs (24h Range):  Temp:  [97.7 °F (36.5 °C)-98.5 °F (36.9 °C)] 97.8 °F (36.6 °C)  Pulse:  [58-77] 69  Resp:  [16-20] 17  SpO2:  [95 %-98 %] 98 %  BP: (123-162)/(62-73) 162/67     Weight: 60.4 kg (133 lb 2.5 oz)  Body mass index is 20.86 kg/m².    Intake/Output Summary (Last 24 hours) at 12/13/2020 1227  Last data filed at 12/12/2020 1548  Gross per 24 hour   Intake 0 ml   Output --   Net 0 ml      Physical Exam  Vitals signs and nursing note reviewed. Exam conducted with a chaperone present.   Constitutional:       Appearance: Normal appearance.   HENT:      Head: Normocephalic and atraumatic.      Nose: Nose normal.      Mouth/Throat:      Mouth: Mucous membranes are moist.   Eyes:      Pupils: Pupils are equal, round, and reactive to light.   Neck:      Musculoskeletal: Normal range of motion and neck supple.   Cardiovascular:      Rate and Rhythm: Normal rate and regular rhythm.   Pulmonary:      Effort: Pulmonary  effort is normal.      Breath sounds: Normal breath sounds.   Abdominal:      General: There is distension.      Tenderness: There is abdominal tenderness.      Comments: reduced   Musculoskeletal: Normal range of motion.   Skin:     General: Skin is warm.      Comments: Alla anal lesion improving     Neurological:      General: No focal deficit present.      Mental Status: She is alert and oriented to person, place, and time.   Psychiatric:      Comments: Somewhat depressed affect         Significant Labs:   Recent Lab Results       12/13/20  0643   12/13/20  0502        Anion Gap 4       Baso # 0.02       Basophil % 0.4       BUN 21       Calcium 7.9       Chloride 113       CO2 23       Creatinine 2.5       Differential Method Automated       eGFR if African American 21.8       eGFR if non  18.9  Comment:  Calculation used to obtain the estimated glomerular filtration  rate (eGFR) is the CKD-EPI equation.          Eos # 0.1       Eosinophil % 2.2       Glucose 75       Gran # (ANC) 3.6       Gran % 64.3       Hematocrit 33.1       Hemoglobin 9.8       Immature Grans (Abs) 0.02  Comment:  Mild elevation in immature granulocytes is non specific and   can be seen in a variety of conditions including stress response,   acute inflammation, trauma and pregnancy. Correlation with other   laboratory and clinical findings is essential.         Immature Granulocytes 0.4       [facility] Test Code:   167376     [facility] Test Name:   Antistreptolysin O Ab     Lymph # 1.4       Lymph % 25.5       Magnesium 1.9       MCH 33.7       MCHC 29.6              Mono # 0.4       Mono % 7.2       MPV 10.5       nRBC 0       Platelets 149       Potassium 4.3       RBC 2.91       RDW 14.3       Sodium 140       WBC 5.57             Significant Imaging: I have reviewed all pertinent imaging results/findings within the past 24 hours.

## 2020-12-13 NOTE — PROGRESS NOTES
Consult Note  Nephrology    Consult Requested By: Ashwin Diaz MD    Reason for Consult: ESRD    SUBJECTIVE:     History of Present Illness:  69 y/o female patient known to our practice, has HD 3x wk on MWF.  Admitted w/UTI, cellulitis.  Renal is consulted for dialysis orders.    12/10  No nausea, chest pain, sob, fever, urinary or bowel complaint, new neurologic symptoms, new joint pain,  12/11  Stopped hd 30 minutes early due to clotting the system  12/12  No chest pain or sob.  No distress  12/13  Had sob last pm.  None this am.  No nausea, chest pain    Assessment/plan:    ESRD  Mild hypokalemia  DM 2  HTN  Anemia of chronic dz  SHPT    --continue HD on pt's schedule, MWF  --correct K+ w/HD  --Blood glucose control per primary team.  --VSS  --start ADAM with hd  --PO4 at goal, not on binders  --lasix 80mg iv (crackles on exam)    Past Medical History:   Diagnosis Date    Cellulitis of trunk     Perineum, numerous episodes    Chronic diarrhea 02/10/2020    Short-gut syndrome    Diabetes mellitus, type 2     pt has not had for years after losing weight    End stage renal disease on dialysis 01/07/2018    Fatigue     Hypertension     Hypothyroidism 2/10/2020    Iron deficiency anemia due to chronic blood loss 1/7/2018    Pulmonary hypertension 3/20/2020    Vulvar cancer 1/7/2019     Past Surgical History:   Procedure Laterality Date    ABDOMINAL SURGERY      APPENDECTOMY      AV FISTULA PLACEMENT Left 2018    BACK SURGERY      BLADDER FULGURATION N/A 6/16/2020    Procedure: FULGURATION, BLADDER;  Surgeon: Damari Barber Jr., MD;  Location: [facility];  Service: Urology;  Laterality: N/A;    carpel tunnel surgery once on each hand      CERVICAL FUSION      x 4    cervix repair      CHOLECYSTECTOMY  2000    COLECTOMY      CYSTOSCOPY W/ RETROGRADES  6/16/2020    Procedure: CYSTOSCOPY, WITH RETROGRADE PYELOGRAM;  Surgeon: Damari Barber Jr., MD;  Location: [facility];  Service: Urology;;     DIAGNOSTIC LAPAROSCOPY N/A 2020    Procedure: LAPAROSCOPY, DIAGNOSTIC;  Surgeon: Robbi Lovell III, MD;  Location: [facility] OR;  Service: General;  Laterality: N/A;    HYSTERECTOMY  1980    ILEOSTOMY N/A 2020    Procedure: CREATION, ILEOSTOMY Loop;  Surgeon: Robbi Lovell III, MD;  Location: [facility] OR;  Service: General;  Laterality: N/A;    ILEOSTOMY CLOSURE N/A 2020    Procedure: CLOSURE, ILEOSTOMY;  Surgeon: Robbi Lovell III, MD;  Location: [facility] OR;  Service: General;  Laterality: N/A;    LUMBAR LAMINECTOMY      LYSIS OF ADHESIONS N/A 2020    Procedure: LYSIS, ADHESIONS;  Surgeon: Robbi Lovell III, MD;  Location: [facility] OR;  Service: General;  Laterality: N/A;    NECK SURGERY      PHLEBOGRAPHY  2020    Procedure: VENOGRAM;  Surgeon: Robbi Lovell III, MD;  Location: [facility] OR;  Service: General;;    REMOVAL OF VASCULAR ACCESS PORT Right 2020    Procedure: REMOVAL, VASCULAR ACCESS PORT;  Surgeon: Robbi Lovell III, MD;  Location: [facility] OR;  Service: General;  Laterality: Right;    SHOULDER SURGERY      vulva cancer       Family History   Problem Relation Age of Onset    Heart disease Mother     Hypertension Mother     Cancer Mother         lung    Heart disease Father     Heart disease Sister         Open heart surgery    No Known Problems Daughter      Social History     Tobacco Use    Smoking status: Former Smoker     Packs/day: 1.00     Years: 33.00     Pack years: 33.00     Types: Cigarettes     Quit date: 2000     Years since quittin.4    Smokeless tobacco: Former User     Quit date:    Substance Use Topics    Alcohol use: No    Drug use: No       Review of patient's allergies indicates:   Allergen Reactions    Ciprofloxacin Other (See Comments)     Elevated liver enzymes      Pcn [penicillins] Anaphylaxis     TOLERATES ROCEPHIN WITHOUT DIFFICULTY        Review of Systems:  General ROS: negative for - fever or night  sweats  Psychological ROS: negative for - behavioral disorder or depression  ENT ROS: negative for - headaches or visual changes  Hematological and Lymphatic ROS: negative for - bleeding problems or bruising  Endocrine ROS: negative for - temperature intolerance or unexpected weight changes  Respiratory ROS: no cough, shortness of breath, or wheezing  Cardiovascular ROS: no chest pain, SOB  Gastrointestinal ROS: no abdominal pain, no n/v/c/d  Genito-Urinary ROS: s/p radiation tx, painful  Musculoskeletal ROS: negative for - joint pain or joint swelling  Neurological ROS: no TIA or stroke symptoms      OBJECTIVE:     Vital Signs Range (Last 24H):  Temp:  [97.6 °F (36.4 °C)-98.5 °F (36.9 °C)]   Pulse:  [58-77]   Resp:  [16-20]   BP: (123-159)/(61-73)   SpO2:  [95 %-98 %]     Physical Exam:  General- NAD noted  HEENT- WNL  Neck- supple  CV- Regular rate and rhythm  Resp- crackles present  GI- Non tender/non-distended, BS normoactive x4 quads  Extrem- No cyanosis, clubbing, edema.  Derm- skin w/d  Neuro-  No flap.     Body mass index is 20.86 kg/m².    Laboratory:  CBC:   Recent Labs   Lab 12/13/20  0643   WBC 5.57   RBC 2.91*   HGB 9.8*   HCT 33.1*   *   *   MCH 33.7*   MCHC 29.6*     CMP:   Recent Labs   Lab 12/08/20  1833  12/13/20  0643   GLU 86   < > 75   CALCIUM 8.8   < > 7.9*   ALBUMIN 3.9  --   --    PROT 6.4  --   --       < > 140   K 3.3*   < > 4.3   CO2 24   < > 23      < > 113*   BUN 24*   < > 21   CREATININE 2.4*   < > 2.5*   ALKPHOS 135  --   --    ALT 18  --   --    AST 18  --   --    BILITOT 0.9  --   --     < > = values in this interval not displayed.       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         Thank you for allowing us to participate in the care of your patient. We will follow the patient and provide recommendations as needed.      Time spent seeing patient( greater than 1/2 spent in direct contact) :     Patient care was time spent personally by me on the following activities:   · Obtaining a history  · Examination of patient.  · Providing medical care at the patients bedside.  · Developing a treatment plan with patient or surrogate and bedside caregivers  · Ordering and reviewing laboratory studies, radiographic studies, pulse oximetry.  · Ordering and performing treatments and interventions.  · Evaluation of patient's response to treatment.  · Discussions with consultants while on the unit and immediately available to the patient.  · Re-evaluation of the patient's condition.  · Documentation in the medical record.       Raúl Dietz MD  Nephrology  [facility]  [phone]

## 2020-12-14 LAB
LABCORP MISC TEST CODE: 6031
LABCORP MISC TEST NAME: NORMAL
LABCORP MISCELLANEOUS TEST: NORMAL

## 2020-12-14 PROCEDURE — 63700000 PHARM REV CODE 250 ALT 637 W/O HCPCS: Performed by: INTERNAL MEDICINE

## 2020-12-14 PROCEDURE — 99232 SBSQ HOSP IP/OBS MODERATE 35: CPT | Mod: ,,, | Performed by: INTERNAL MEDICINE

## 2020-12-14 PROCEDURE — 25000003 PHARM REV CODE 250: Performed by: INTERNAL MEDICINE

## 2020-12-14 PROCEDURE — 63600175 PHARM REV CODE 636 W HCPCS: Performed by: INTERNAL MEDICINE

## 2020-12-14 PROCEDURE — 99232 PR SUBSEQUENT HOSPITAL CARE,LEVL II: ICD-10-PCS | Mod: ,,, | Performed by: INTERNAL MEDICINE

## 2020-12-14 PROCEDURE — 90935 HEMODIALYSIS ONE EVALUATION: CPT

## 2020-12-14 PROCEDURE — 12000002 HC ACUTE/MED SURGE SEMI-PRIVATE ROOM

## 2020-12-14 RX ADMIN — LINEZOLID 600 MG: 600 TABLET, FILM COATED ORAL at 09:12

## 2020-12-14 RX ADMIN — DILTIAZEM HYDROCHLORIDE 120 MG: 120 CAPSULE, COATED, EXTENDED RELEASE ORAL at 09:12

## 2020-12-14 RX ADMIN — CHOLESTYRAMINE 4 G: 4 POWDER, FOR SUSPENSION ORAL at 05:12

## 2020-12-14 RX ADMIN — HYDROCODONE BITARTRATE AND ACETAMINOPHEN 1 TABLET: 10; 325 TABLET ORAL at 03:12

## 2020-12-14 RX ADMIN — LEVOTHYROXINE SODIUM 88 MCG: 88 TABLET ORAL at 05:12

## 2020-12-14 RX ADMIN — HYDROCODONE BITARTRATE AND ACETAMINOPHEN 1 TABLET: 10; 325 TABLET ORAL at 10:12

## 2020-12-14 RX ADMIN — CHOLESTYRAMINE 4 G: 4 POWDER, FOR SUSPENSION ORAL at 01:12

## 2020-12-14 RX ADMIN — HYDROCODONE BITARTRATE AND ACETAMINOPHEN 1 TABLET: 10; 325 TABLET ORAL at 04:12

## 2020-12-14 RX ADMIN — EPOETIN ALFA-EPBX 6000 UNITS: 10000 INJECTION, SOLUTION INTRAVENOUS; SUBCUTANEOUS at 02:12

## 2020-12-14 RX ADMIN — ACETAMINOPHEN 650 MG: 325 TABLET, FILM COATED ORAL at 10:12

## 2020-12-14 RX ADMIN — MUPIROCIN: 20 OINTMENT TOPICAL at 09:12

## 2020-12-14 RX ADMIN — SODIUM BICARBONATE 650 MG TABLET 650 MG: at 09:12

## 2020-12-14 RX ADMIN — CEFTRIAXONE SODIUM 1 G: 1 INJECTION, POWDER, FOR SOLUTION INTRAMUSCULAR; INTRAVENOUS at 10:12

## 2020-12-14 RX ADMIN — CHOLESTYRAMINE 4 G: 4 POWDER, FOR SUSPENSION ORAL at 09:12

## 2020-12-14 RX ADMIN — SERTRALINE HYDROCHLORIDE 50 MG: 50 TABLET ORAL at 09:12

## 2020-12-14 RX ADMIN — FLUCONAZOLE 100 MG: 100 TABLET ORAL at 09:12

## 2020-12-14 NOTE — PROGRESS NOTES
Consult Note  Nephrology    Consult Requested By: Ashwin Diaz MD    Reason for Consult: ESRD    SUBJECTIVE:     History of Present Illness:    70F with ESRD on HD MWF admitted w/UTI, cellulitis.    12/10  No nausea, chest pain, sob, fever, urinary or bowel complaint, new neurologic symptoms, new joint pain,  12/11  Stopped hd 30 minutes early due to clotting the system  12/12  No chest pain or sob.  No distress  12/13  Had sob last pm.  None this am.  No nausea, chest pain  12/14 VSS, no new complains. HD today.    Assessment/plan:    ESRD  DM 2  HTN  Anemia of chronic dz  SHPT  Cellulitis of perineum, recurrent  UTI    --continue HD on pt's schedule, MWF  --correct K+ w/HD  --Blood glucose control per primary team.  --give ADAM with hd as needed  --PO4 at goal, not on binders  --abx per ID and primary team    Past Medical History:   Diagnosis Date    Cellulitis of trunk     Perineum, numerous episodes    Chronic diarrhea 02/10/2020    Short-gut syndrome    Diabetes mellitus, type 2     pt has not had for years after losing weight    End stage renal disease on dialysis 01/07/2018    Fatigue     Hypertension     Hypothyroidism 2/10/2020    Iron deficiency anemia due to chronic blood loss 1/7/2018    Pulmonary hypertension 3/20/2020    Vulvar cancer 1/7/2019     Past Surgical History:   Procedure Laterality Date    ABDOMINAL SURGERY      APPENDECTOMY      AV FISTULA PLACEMENT Left 2018    BACK SURGERY      BLADDER FULGURATION N/A 6/16/2020    Procedure: FULGURATION, BLADDER;  Surgeon: Damari Barber Jr., MD;  Location: [facility] OR;  Service: Urology;  Laterality: N/A;    carpel tunnel surgery once on each hand      CERVICAL FUSION      x 4    cervix repair      CHOLECYSTECTOMY  2000    COLECTOMY      CYSTOSCOPY W/ RETROGRADES  6/16/2020    Procedure: CYSTOSCOPY, WITH RETROGRADE PYELOGRAM;  Surgeon: Damari Barber Jr., MD;  Location: [facility] OR;  Service: Urology;;    DIAGNOSTIC LAPAROSCOPY N/A  2020    Procedure: LAPAROSCOPY, DIAGNOSTIC;  Surgeon: Robbi Lovell III, MD;  Location: [facility] OR;  Service: General;  Laterality: N/A;    HYSTERECTOMY  1980    ILEOSTOMY N/A 2020    Procedure: CREATION, ILEOSTOMY Loop;  Surgeon: Robbi Lovell III, MD;  Location: [facility] OR;  Service: General;  Laterality: N/A;    ILEOSTOMY CLOSURE N/A 2020    Procedure: CLOSURE, ILEOSTOMY;  Surgeon: Robbi Lovell III, MD;  Location: [facility] OR;  Service: General;  Laterality: N/A;    LUMBAR LAMINECTOMY      LYSIS OF ADHESIONS N/A 2020    Procedure: LYSIS, ADHESIONS;  Surgeon: Robbi Lovell III, MD;  Location: [facility] OR;  Service: General;  Laterality: N/A;    NECK SURGERY      PHLEBOGRAPHY  2020    Procedure: VENOGRAM;  Surgeon: Robbi Lovell III, MD;  Location: [facility] OR;  Service: General;;    REMOVAL OF VASCULAR ACCESS PORT Right 2020    Procedure: REMOVAL, VASCULAR ACCESS PORT;  Surgeon: Robbi Lovell III, MD;  Location: [facility] OR;  Service: General;  Laterality: Right;    SHOULDER SURGERY      vulva cancer       Family History   Problem Relation Age of Onset    Heart disease Mother     Hypertension Mother     Cancer Mother         lung    Heart disease Father     Heart disease Sister         Open heart surgery    No Known Problems Daughter      Social History     Tobacco Use    Smoking status: Former Smoker     Packs/day: 1.00     Years: 33.00     Pack years: 33.00     Types: Cigarettes     Quit date: 2000     Years since quittin.4    Smokeless tobacco: Former User     Quit date:    Substance Use Topics    Alcohol use: No    Drug use: No       Review of patient's allergies indicates:   Allergen Reactions    Ciprofloxacin Other (See Comments)     Elevated liver enzymes      Pcn [penicillins] Anaphylaxis     TOLERATES ROCEPHIN WITHOUT DIFFICULTY       OBJECTIVE:     Vital Signs Range (Last 24H):  Temp:  [97 °F (36.1 °C)-98.3 °F (36.8 °C)]   Pulse:   [62-73]   Resp:  [16-18]   BP: (121-162)/(65-76)   SpO2:  [96 %-98 %]     Physical Exam:  General- NAD noted  HEENT- WNL  Neck- supple  CV- Regular rate and rhythm  Resp- crackles present  GI- Non tender/non-distended, BS normoactive x4 quads  Extrem- No cyanosis, clubbing, edema.  Derm- skin w/d  Neuro-  No flap.     Body mass index is 20.86 kg/m².    Laboratory:  Recent Labs   Lab 12/11/20  0511 12/12/20  0502 12/13/20  0643    142 140   K 4.3 4.2 4.3   * 112* 113*   CO2 19* 22* 23   BUN 17 13 21   CREATININE 2.7* 2.1* 2.5*   ESTGFRAFRICA 19.8* 26.9* 21.8*   EGFRNONAA 17.2* 23.3* 18.9*   GLU 74 124* 75       Recent Labs   Lab 12/08/20  1833  12/11/20  0511 12/12/20  0502 12/13/20  0643   CALCIUM 8.8   < > 7.6* 8.6* 7.9*   ALBUMIN 3.9  --   --   --   --    PHOS 3.5  --   --  4.0  --    MG 1.5*   < > 1.5* 1.4* 1.9    < > = values in this interval not displayed.       Recent Labs   Lab 09/30/19  0820 11/15/19  0748   PTH, Intact 176.4 H 238.4 H       No results for input(s): POCTGLUCOSE in the last 168 hours.    Recent Labs   Lab 07/11/19  0806 11/15/19  0748 01/22/20  0848   Hemoglobin A1C 5.1 Invalid A SEE COMMENT       Recent Labs   Lab 12/11/20  0511 12/12/20  0502 12/13/20  0643   WBC 5.14 3.66* 5.57   HGB 9.5* 10.1* 9.8*   HCT 32.1* 33.9* 33.1*   * 143* 149*   * 111* 114*   MCHC 29.6* 29.8* 29.6*   MONO 9.5  0.5 1.6*  0.1* 7.2  0.4       Recent Labs   Lab 12/08/20  1833   BILITOT 0.9   PROT 6.4   ALBUMIN 3.9   ALKPHOS 135   ALT 18   AST 18       Recent Labs   Lab 06/14/20  1305 12/08/20 2053 12/09/20  2323   Color, UA Red A Orange A Yellow   Appearance, UA Cloudy A Cloudy A Cloudy A   pH, UA 7.0 7.0 6.0   Specific Inlet, UA 1.015 1.010 1.020   Protein, UA SEE COMMENT 2+ A 1+ A   Glucose, UA SEE COMMENT Negative Negative   Ketones, UA SEE COMMENT Negative Negative   Urobilinogen, UA SEE COMMENT Negative Negative   Bilirubin (UA) SEE COMMENT Negative Negative   Occult Blood UA SEE  COMMENT 3+ A 3+ A   Nitrite, UA SEE COMMENT Negative Negative   RBC, UA >100 H >100 H 1   WBC, UA 0 >100 H >100 H   Bacteria Negative Many A Negative   Hyaline Casts, UA 3 A 28 A 10 A       Recent Labs   Lab 03/16/20  1551 12/08/20 2024   POC PH 7.402 7.370   POC PCO2 39.9 39.0   POC HCO3 24.9 22.5 L   POC PO2 58 LL 85   POC SATURATED O2 90 L 96   POC BE 0 -3   Sample ARTERIAL ARTERIAL         ASSESSMENT/PLAN:     Active Hospital Problems    Diagnosis  POA    *Perineal rash, female [R21]  Yes    Pneumonia due to infectious organism [J18.9]  Yes    Cellulitis of perineum [L03.315]  Unknown     Numerous episodes      Anemia of chronic disease [D63.8]  Yes     Chronic    Chronic diarrhea [K52.9]  Yes     Chronic    Anemia due to stage 5 chronic kidney disease treated with erythropoietin [N18.5, D63.1]  Yes    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     Thank you for allowing us to participate in the care of your patient. We will follow the patient and provide recommendations as needed.    Patient care was time spent personally by me on the following activities:   · Obtaining a history  · Examination of patient.  · Providing medical care at the patients bedside.  · Developing a treatment plan with patient or surrogate and bedside caregivers  · Ordering and reviewing laboratory studies, radiographic studies, pulse oximetry.  · Ordering and performing treatments and interventions.  · Evaluation of patient's response to treatment.  · Discussions with consultants while on the unit and immediately available to the patient.  · Re-evaluation of the patient's condition.  · Documentation in the medical record.     Jorge Webb MD  Nephrology  [facility] [place]  [phone]

## 2020-12-14 NOTE — PROGRESS NOTES
"[facility]  Adult Nutrition   Progress Note (Initial Assessment)     SUMMARY     Recommendations  Recommendation/Intervention: 1. Continue diet as tolerated by pt--encourage intake 2. Monitor labs--restrict diet as necessary 3.  to assist with meal choices daily  Goals: 1. Pt to meet at least 75% of estimated needs through oral diet intake  Nutrition Goal Status: new    Dietitian Rounds Brief    Pt seen for LOS. Noted HD today. Pt with fluid overload per notes. + antbx ongoing. ID following. Tolerating diet; intake varied depending upon menu. Several complaints re: meals. RD to address with dietary staff. No N/V. LBM 12/13. Per EMR weight history; noted weight fluctuations ? 2' HD/fluid changes.    Reason for Assessment  Reason For Assessment: length of stay  Diagnosis: other (see comments)(perineal rash)  Relevant Medical History: ESRD on HD, HTN, vulvular cancer, DM, chronic diarrhea  Interdisciplinary Rounds: attended    Nutrition Risk Screen  Nutrition Risk Screen: no indicators present       Incision/Site 06/16/20 1218 Perineum-Wound Image: Images linked  MST Score: 0  Have you recently lost weight without trying?: No  Weight loss score: 0  Have you been eating poorly because of a decreased appetite?: No  Appetite score: 0       Nutrition/Diet History  Patient Reported Diet/Restrictions/Preferences: general  Food Allergies: NKFA  Factors Affecting Nutritional Intake: None identified at this time    Anthropometrics  Temp: 98 °F (36.7 °C)  Height Method: Stated  Height: 5' 7" (170.2 cm)  Height (inches): 67 in  Weight Method: Bed Scale  Weight: 60.4 kg (133 lb 2.5 oz)  Weight (lb): 133.16 lb  Ideal Body Weight (IBW), Female: 135 lb  % Ideal Body Weight, Female (lb): 98.64 %  BMI (Calculated): 20.9  BMI Grade: 18.5-24.9 - normal       Weight History:  Wt Readings from Last 10 Encounters:   12/14/20 60.4 kg (133 lb 2.5 oz)   07/30/20 53.5 kg (118 lb)   06/29/20 56.2 kg (124 lb)   06/25/20 55 " kg (121 lb 5.8 oz)   06/14/20 56.7 kg (125 lb)   06/10/20 62 kg (136 lb 11 oz)   06/02/20 60.3 kg (133 lb)   05/28/20 64.9 kg (143 lb)   05/21/20 65.1 kg (143 lb 8.3 oz)   05/15/20 56.2 kg (123 lb 14.4 oz)       Lab/Procedures/Meds: Pertinent Labs Reviewed    Clinical Chemistry:  Recent Labs   Lab 12/08/20  1833  12/12/20  0502 12/13/20  0643      < > 142 140   K 3.3*   < > 4.2 4.3      < > 112* 113*   CO2 24   < > 22* 23   GLU 86   < > 124* 75   BUN 24*   < > 13 21   CREATININE 2.4*   < > 2.1* 2.5*   CALCIUM 8.8   < > 8.6* 7.9*   PROT 6.4  --   --   --    ALBUMIN 3.9  --   --   --    BILITOT 0.9  --   --   --    ALKPHOS 135  --   --   --    AST 18  --   --   --    ALT 18  --   --   --    ANIONGAP 10   < > 8 4*   ESTGFRAFRICA 22.9*   < > 26.9* 21.8*   EGFRNONAA 19.9*   < > 23.3* 18.9*   MG 1.5*   < > 1.4* 1.9   PHOS 3.5  --  4.0  --     < > = values in this interval not displayed.       CBC:   Recent Labs   Lab 12/13/20  0643   WBC 5.57   RBC 2.91*   HGB 9.8*   HCT 33.1*   *   *   MCH 33.7*   MCHC 29.6*         Cardiac Profile:  Recent Labs   Lab 12/08/20  1833   BNP 81   CPK 55   CPKMB 1.7   TROPONINI <0.030       Inflammatory Labs:  Recent Labs   Lab 12/08/20  1833   CRP 0.46       Thyroid & Parathyroid:  Recent Labs   Lab 12/08/20  1833   TSH 8.660*   FREET4 0.82         Medications: Pertinent Medications reviewed    Scheduled Meds:   cefTRIAXone (ROCEPHIN) IVPB  1 g Intravenous Q24H    cholestyramine-aspartame  4 g Oral QID    diltiaZEM  120 mg Oral Daily    epoetin nica-epbx  100 Units/kg Subcutaneous Once per day on Mon Wed Fri    fluconazole  100 mg Oral Daily    furosemide (LASIX) IV  80 mg Intravenous Once    levothyroxine  88 mcg Oral Before breakfast    linezolid  600 mg Oral Q12H    sertraline  50 mg Oral QHS    sodium bicarbonate  650 mg Oral BID       Continuous Infusions:    PRN Meds:.acetaminophen, dextrose 50%, dextrose 50%, diphenhydrAMINE, diphenhydrAMINE-zinc  acetate 1-0.1%, glucagon (human recombinant), glucose, glucose, HYDROcodone-acetaminophen, lidocaine HCL 2%, magnesium sulfate IVPB, magnesium sulfate IVPB, magnesium sulfate IVPB, magnesium sulfate IVPB, melatonin, ondansetron, sodium chloride 0.9%, traMADoL, zinc oxide    Estimated/Assessed Needs  Weight Used For Calorie Calculations: 60.4 kg (133 lb 2.5 oz)  Energy Calorie Requirements (kcal): 5346-3578 kcals (25-30 kcal/kg)  Energy Need Method: Kcal/kg  Protein Requirements: 72-90 g (1.2-1.5 g/kg)  Weight Used For Protein Calculations: 60.4 kg (133 lb 2.5 oz)  Fluid Requirements (mL): UOP + 1000 ml  Estimated Fluid Requirement Method: other (see comments)  RDA Method (mL): 1510       Nutrition Prescription Ordered  Current Diet Order: Regular    Evaluation of Received Nutrient/Fluid Intake  Energy Calories Required: not meeting needs  Protein Required: not meeting needs  Fluid Required: meeting needs  Tolerance: tolerating     Intake/Output Summary (Last 24 hours) at 12/14/2020 1319  Last data filed at 12/14/2020 1215  Gross per 24 hour   Intake 500 ml   Output 2500 ml   Net -2000 ml        % Meal Intake: 50 - 75 %    Nutrition Risk  Level of Risk/Frequency of Follow-up: moderate - high     Monitor and Evaluation  Food and Nutrient Intake: energy intake, food and beverage intake  Food and Nutrient Adminstration: diet order  Physical Activity and Function: nutrition-related ADLs and IADLs  Anthropometric Measurements: weight, weight change, body mass index  Biochemical Data, Medical Tests and Procedures: electrolyte and renal panel, gastrointestinal profile, glucose/endocrine profile  Nutrition-Focused Physical Findings: overall appearance     Nutrition Follow-Up  RD Follow-up?: Yes      Altagracia Mcdonough, SHERIF 12/14/2020 1:19 PM

## 2020-12-14 NOTE — PROGRESS NOTES
[facility] Medicine  Progress Note    Patient Name: Shara Hutchins  MRN: [id number]  Patient Class: IP- Inpatient   Admission Date: 12/8/2020  Length of Stay: 4 days  Attending Physician: Ashwin Diaz MD  Primary Care Provider: April Horton MD        Subjective:     Principal Problem:Perineal rash, female        HPI:  History of Present Illness / Brief Hospital Course:  Shara Hutchins is a 70 y.o. female who  has a past medical history of Chronic kidney disease, stage III (moderate) (1/7/2018), Diabetes mellitus, type 2, Fatigue, Hemodialysis patient, Hypertension, Iron deficiency anemia due to chronic blood loss (1/7/2018), and Vulvar cancer (1/7/2019).. The patient presented to the on 12/8/2020 with a primary complaint of BUTTOCK AND VAGINAL PAIN (S/P RADIATION), COLD S/S, Chills, and Cough     She has chronic radiation changes to her vulva and perineum which are intermittently bothersome for her. She has chronic diarrhea and is often incontinent of urine and stool.   Currently, she says she has had worsening pain and erythema, which are now severe, to the area over the past couple of days and thinks she is developing an infection. She has had some chills but denies fevers.   She also c/o of a dry cough for the past few days.   Denies chest pain, sick contacts, diaphoresis, lightheadedness, syncope.   She was started on meropenem and vancomycin in the ED.     Overview/Hospital Course:  12/9/2020  Pt has a considerable amount of perianal pain. She has a short gut and chronic diarrhea. Her perianal pain was 10/10. She states that she saw Dr Neal this AM.    12/10/2020  Ms Hutchins is feeling better with less perianal pain.    12/11/2020  Pt still has some abdominal discomfort 4-6/10 with less diarrhea. Overall she is feeling better in the anal region.    12/12/2020  Pt has a sore throat and would like chloraseptic spray. My bottom still hurt but is better and the diarrhea is the  same.    12/13/2020  Pt did not tolerate linazolide due to lower extremity swelling after the dose. She would like to restart her Zoloft     12/14/2020  Ms Cuong notes that she is volume overloaded for IV medication. He perianal pain has improved.    Interval History: Pt has had volume ovreload with dyspnea resolving with dialysis.    Review of Systems   Constitutional: Positive for diaphoresis and fatigue.   HENT: Negative.    Eyes: Negative.    Respiratory: Positive for shortness of breath.         Resolving with dialysis   Cardiovascular: Negative.    Gastrointestinal: Negative.    Endocrine: Positive for cold intolerance.   Genitourinary: Negative.    Musculoskeletal: Positive for arthralgias and myalgias.   Skin:        Perianal cellulitis improving   Neurological: Positive for weakness.   Hematological: Bruises/bleeds easily.   Psychiatric/Behavioral: Positive for decreased concentration, dysphoric mood and sleep disturbance. The patient is nervous/anxious.         Resolving with treatment of perianal cellulitis     Objective:     Vital Signs (Most Recent):  Temp: 97 °F (36.1 °C) (12/14/20 0845)  Pulse: 64 (12/14/20 1130)  Resp: 16 (12/14/20 0845)  BP: 135/73 (12/14/20 1130)  SpO2: 97 % (12/14/20 0727) Vital Signs (24h Range):  Temp:  [97 °F (36.1 °C)-98.3 °F (36.8 °C)] 97 °F (36.1 °C)  Pulse:  [62-73] 64  Resp:  [16-18] 16  SpO2:  [96 %-98 %] 97 %  BP: (121-153)/(65-78) 135/73     Weight: 60.4 kg (133 lb 2.5 oz)  Body mass index is 20.86 kg/m².  No intake or output data in the 24 hours ending 12/14/20 1257   Physical Exam  Vitals signs and nursing note reviewed.   Constitutional:       Appearance: Normal appearance.   HENT:      Head: Normocephalic and atraumatic.      Nose: Nose normal.      Mouth/Throat:      Mouth: Mucous membranes are moist.   Eyes:      Pupils: Pupils are equal, round, and reactive to light.   Neck:      Musculoskeletal: Normal range of motion and neck supple.   Cardiovascular:      Rate  and Rhythm: Normal rate and regular rhythm.      Heart sounds: Gallop present.    Pulmonary:      Effort: Pulmonary effort is normal.      Breath sounds: Rales present.   Abdominal:      General: Bowel sounds are normal. There is no distension.      Tenderness: There is no abdominal tenderness. There is no guarding.   Musculoskeletal: Normal range of motion.   Skin:     General: Skin is warm.   Neurological:      General: No focal deficit present.      Mental Status: She is alert and oriented to person, place, and time.   Psychiatric:         Mood and Affect: Mood normal.         Significant Labs:   Recent Lab Results     None          Significant Imaging: I have reviewed all pertinent imaging results/findings within the past 24 hours.      Assessment/Plan:   12/14/2020  A)  Volume overload secondary to antibiotic?  Taking linazolide  Perianal cellulitis in a patient with short gut syndrome induced diarrhea responding to antibiotics   P)  ID and nephrology input appreciated  Dialysis this date  Continue linazolide  Consider LTAC placement vs SNF      12/13/2020  A)  Perianal cellulitis with lymphedema with symptoms resolving slowly  Did not tolerate linazolide  Short gut syndrome with diarrhea contributing to the above  ESRD on hemodialysis  P)  D/C linazolide at pt request  Nephrology and ID consults appreciated        12/12/2020  A)  Perianal cellulitis and lymphedema  Short gut syndrome and diarrhea-unabated with questran  ESRD on hemodialysis  HTN  Negative C diff EIA  P)  Chloraseptic spray PRN throat pain    Nephrology and ID consults appreciated  Continue current therapy        12/11/2020  A)  Perianal cellulitis and lymphedema  Short gut syndrome with diarrhea  ESRD on hemodialysis  HTN  Negative C diff EIA  P)  Nephrology and ID consults appreciated  Continue present therapy  Solumedrol 40 mg IVP         12/10/2020  A)  Perianal cellulitis and lymphedema secondary to chronic diarrhea  Short gut syndrome    ESRD on hemodialysis  HTN  Hx of vulvar cancer-could she have Bowen's disease of the anus  P)  Nephrology and ID consults appreciated  Cholestyramine changed to TID  Otherwise continue present therapy     12/9/2020  A)  Perianal edema with erythema and irritation in a patient with short gut syndrome and chronic diarrhea  ESRD on hemodialysis  HTN  Hx of Vulvar cancer  P)  Rocephin 1 gram Q 24  Diflucan orally  Hydrocodone 10 po Q 6 PRN pain  ID consult appreciated  Nephrology consulted for dialysis  Increase cholestyramine to TID  No notes have been filed under this hospital service.  Service: Hospital Medicine    VTE Risk Mitigation (From admission, onward)         Ordered     IP VTE HIGH RISK PATIENT  Once      12/09/20 0227     Place sequential compression device  Until discontinued      12/09/20 0227     Place JA hose  Until discontinued      12/09/20 0227                Discharge Planning   RODRIGO:      Code Status: Full Code   Is the patient medically ready for discharge?:     Reason for patient still in hospital (select all that apply): Treatment and Consult recommendations  Discharge Plan A: Home with family                  Ashwin Diaz MD  Department of Hospital Medicine   [facility]

## 2020-12-14 NOTE — SUBJECTIVE & OBJECTIVE
Interval History: Pt has had volume ovreload with dyspnea resolving with dialysis.    Review of Systems   Constitutional: Positive for diaphoresis and fatigue.   HENT: Negative.    Eyes: Negative.    Respiratory: Positive for shortness of breath.         Resolving with dialysis   Cardiovascular: Negative.    Gastrointestinal: Negative.    Endocrine: Positive for cold intolerance.   Genitourinary: Negative.    Musculoskeletal: Positive for arthralgias and myalgias.   Skin:        Perianal cellulitis improving   Neurological: Positive for weakness.   Hematological: Bruises/bleeds easily.   Psychiatric/Behavioral: Positive for decreased concentration, dysphoric mood and sleep disturbance. The patient is nervous/anxious.         Resolving with treatment of perianal cellulitis     Objective:     Vital Signs (Most Recent):  Temp: 97 °F (36.1 °C) (12/14/20 0845)  Pulse: 64 (12/14/20 1130)  Resp: 16 (12/14/20 0845)  BP: 135/73 (12/14/20 1130)  SpO2: 97 % (12/14/20 0727) Vital Signs (24h Range):  Temp:  [97 °F (36.1 °C)-98.3 °F (36.8 °C)] 97 °F (36.1 °C)  Pulse:  [62-73] 64  Resp:  [16-18] 16  SpO2:  [96 %-98 %] 97 %  BP: (121-153)/(65-78) 135/73     Weight: 60.4 kg (133 lb 2.5 oz)  Body mass index is 20.86 kg/m².  No intake or output data in the 24 hours ending 12/14/20 1257   Physical Exam  Vitals signs and nursing note reviewed.   Constitutional:       Appearance: Normal appearance.   HENT:      Head: Normocephalic and atraumatic.      Nose: Nose normal.      Mouth/Throat:      Mouth: Mucous membranes are moist.   Eyes:      Pupils: Pupils are equal, round, and reactive to light.   Neck:      Musculoskeletal: Normal range of motion and neck supple.   Cardiovascular:      Rate and Rhythm: Normal rate and regular rhythm.      Heart sounds: Gallop present.    Pulmonary:      Effort: Pulmonary effort is normal.      Breath sounds: Rales present.   Abdominal:      General: Bowel sounds are normal. There is no distension.       Tenderness: There is no abdominal tenderness. There is no guarding.   Musculoskeletal: Normal range of motion.   Skin:     General: Skin is warm.   Neurological:      General: No focal deficit present.      Mental Status: She is alert and oriented to person, place, and time.   Psychiatric:         Mood and Affect: Mood normal.         Significant Labs:   Recent Lab Results     None          Significant Imaging: I have reviewed all pertinent imaging results/findings within the past 24 hours.

## 2020-12-14 NOTE — PROGRESS NOTES
Progress Note  Infectious Disease    Admit Date: 12/8/2020   LOS: 4 days     SUBJECTIVE:  No complaints today     Follow-up For:  For forearm perineal cellulitis    INTERVAL HISTORY:  She has a history of gynecological cancer and received radiation 2013 followed by chemotherapy.  Since then she has had recurrent vulvar perineal cellulitis.  Has about 3-4 episodes every year.  Last major hospitalization was in March through May 2020 by her history.  Also has ESRD and is on hemodialysis.  Also history of chronic diarrhea with 10-12 bowel motions daily secondary to short bowel syndrome and chemotherapy side effects.     She was admitted on 12/09/2020 and is currently on therapy for cellulitis.  Leukocytosis has resolved.  Edema appears to be slowly resolving.  urine cultur grew group C Streptococcus and E coli.  She is currently on vancomycin and ceftriaxone.  Also on prophylactic fluconazole for vaginal candidiasis.    12/13/2020:  She had bilateral leg swelling, left upper arm swelling difficulty breathing yesterday after receiving Versed IV dose of linezolid.  Had also received 2 other IV medications.  There was no rash.  She states the leg swelling is better today after she urinated quite a bit last night.  Informs me that it usually is no need to pull a lot of volume from hot during dialysis.  She had refused a 2nd dose of linezolid pending discussion with me.    12/14: interim reviewed. Now on po zyvox(also on zoloft), diflucan and rocephin. She has definitely improved since my last exam. She is breathing more easily now that fluid has been removed. It is possible that much her erythema/induration is venous stasis. She cannot tell if the QID questran is more successful at controlling her diarrhea than BID was.     Antibiotics (From admission, onward)    Start     Stop Route Frequency Ordered    12/13/20 2100  linezolid tablet 600 mg      -- Oral Every 12 hours 12/13/20 1313    12/09/20 1000  cefTRIAXone  (ROCEPHIN) 1 g/50 mL D5W IVPB      -- IV Every 24 hours (non-standard times) 12/09/20 0900          Antifungals (From admission, onward)    Start     Stop Route Frequency Ordered    12/09/20 0900  fluconazole tablet 100 mg      -- Oral Daily 12/09/20 0857           Antivirals (From admission, onward)    None             OBJECTIVE:     Vital Signs (Most Recent)  Temp: 98.4 °F (36.9 °C) (12/14/20 1545)  Pulse: 64 (12/14/20 1545)  Resp: 17 (12/14/20 1545)  BP: (!) 148/74 (12/14/20 1545)  SpO2: 98 % (12/14/20 1545)    Temperature Range Min/Max (Last 24H):  Temp:  [97 °F (36.1 °C)-98.4 °F (36.9 °C)]     I & O (Last 24H):    Intake/Output Summary (Last 24 hours) at 12/14/2020 1635  Last data filed at 12/14/2020 1215  Gross per 24 hour   Intake 500 ml   Output 2500 ml   Net -2000 ml       Physical Exam:  General:  In NAD. Alert and attentive, cooperative, comfortable  Eyes:  Anicteric, PERRL, EOMI  ENT:  No ulcers, exudates, thrush, nares patent,    Neck:   supple,    Lungs: Minimal basilar crackles  Heart:  RRR, no gallop/murmur noted  Abd:  Soft, NT, ND, normal BS, no masses/organomegaly appreciated.  :  Edema of mons and labia are much improved as well as the edema of her buttocks. Erythema is much improved as well   Musc:  Joints without effusion, swelling,  erythema, synovitis, ambulatory  Skin:  No rashes.     Neuro: Alert, attentive, speech fluent, face symmetric, moves all extremities, no focal weakness   Psych: Calm, cooperative  Extrem: no edema, no erythema, phlebitis, cellulitis, warm and well perfused  VAD:  Isolation:  None      Laboratory:    Recent Labs   Lab 12/11/20  0511 12/12/20  0502 12/13/20  0643   WBC 5.14 3.66* 5.57   HGB 9.5* 10.1* 9.8*   HCT 32.1* 33.9* 33.1*   * 143* 149*       Recent Labs   Lab 12/08/20  1833  12/11/20  0511 12/12/20  0502 12/13/20  0643      < > 140 142 140   K 3.3*   < > 4.3 4.2 4.3      < > 114* 112* 113*   CO2 24   < > 19* 22* 23   BUN 24*   < > 17 13  21   CREATININE 2.4*   < > 2.7* 2.1* 2.5*   CALCIUM 8.8   < > 7.6* 8.6* 7.9*   PROT 6.4  --   --   --   --    BILITOT 0.9  --   --   --   --    ALKPHOS 135  --   --   --   --    ALT 18  --   --   --   --    AST 18  --   --   --   --     < > = values in this interval not displayed.     Recent Labs   Lab 12/08/20 1833   CRP 0.46       Microbiology Results (last 7 days)     Procedure Component Value Units Date/Time    Blood culture x two cultures. Draw prior to antibiotics. [[id number]] Collected: 12/08/20 1830    Order Status: Completed Specimen: Blood from Peripheral, Antecubital, Right Updated: 12/13/20 2032     Blood Culture, Routine No growth after 5 days.    Narrative:      Aerobic and anaerobic    Urine culture [[id number]]  (Abnormal)  (Susceptibility) Collected: 12/08/20 2053    Order Status: Completed Specimen: Urine from Clean Catch Updated: 12/11/20 1322     Urine Culture, Routine ESCHERICHIA COLI  >100,000 cfu/ml        STREPTOCOCCUS GROUP G  > 100,000 cfu/ml  Presumptive ID  Beta-hemolytic streptococci are routinely susceptible to   penicillins,cephalosporins and carbapenems.      Urine culture [[id number]] Collected: 12/09/20 2323    Order Status: Completed Specimen: Urine Updated: 12/11/20 0857     Urine Culture, Routine Multiple organisms isolated. None in predominance.  Repeat if      clinically necessary.    Narrative:      Specimen Source->Urine    Blood culture x two cultures. Draw prior to antibiotics. [[id number]]  (Abnormal) Collected: 12/08/20 2006    Order Status: Completed Specimen: Blood from Peripheral, Wrist, Right Updated: 12/11/20 0827     Blood Culture, Routine Gram stain aer bottle: Gram positive cocci in clusters resembling Staph      Results called to and read back by: Keri Blount on 1 west by ANA LAURA      12/10/2020  10:53      MICROCOCCUS SPECIES  Organism is a probable contaminant      Narrative:      Aerobic and anaerobic    Clostridium difficile EIA [[id number]] Collected: 12/09/20  2216    Order Status: Completed Specimen: Stool Updated: 12/09/20 2341     C. diff Antigen Negative     C difficile Toxins A+B, EIA Negative     Comment: Testing not recommended for children <24 months old.       Culture, Respiratory with Gram Stain [[id number]]     Order Status: Canceled Specimen: Respiratory from Sputum, Expectorated     Urine Culture High Risk [[id number]]     Order Status: Completed Specimen: Urine, Clean Catch           Diagnostic Results: Reviewed    Cardiology:    ASSESSMENT/PLAN:   1.  Vulvar and perineal cellulitis.  This is a recurrent infection.  Improved    2.  ESRD on HD    3.  Chronic diarrhea secondary to short gut syndrome    4.  Bacteriuria.  On ceftriaxone for this indication given complexity of her presentation.    5.  Fluid overload.   From IV meds?    .recommendation:  Home on cefuroxime, diflucan in am  I have given her rx for cefuroxime, diflucan, questran and lidocaine jelly

## 2020-12-14 NOTE — PLAN OF CARE
Important Message from Medicare was sign, explained and given to patient/caregiver on 12/14/2020 at 2:48pm     answered all questions.

## 2020-12-15 VITALS
TEMPERATURE: 99 F | OXYGEN SATURATION: 96 % | WEIGHT: 133.19 LBS | BODY MASS INDEX: 20.9 KG/M2 | RESPIRATION RATE: 17 BRPM | HEIGHT: 67 IN | HEART RATE: 70 BPM | DIASTOLIC BLOOD PRESSURE: 77 MMHG | SYSTOLIC BLOOD PRESSURE: 157 MMHG

## 2020-12-15 PROBLEM — K61.0 PERIANAL CELLULITIS: Status: ACTIVE | Noted: 2020-12-09

## 2020-12-15 LAB
ANION GAP SERPL CALC-SCNC: 5 MMOL/L (ref 8–16)
BUN SERPL-MCNC: 13 MG/DL (ref 8–23)
CALCIUM SERPL-MCNC: 8 MG/DL (ref 8.7–10.5)
CHLORIDE SERPL-SCNC: 109 MMOL/L (ref 95–110)
CO2 SERPL-SCNC: 25 MMOL/L (ref 23–29)
CREAT SERPL-MCNC: 2.2 MG/DL (ref 0.5–1.4)
ERYTHROCYTE [DISTWIDTH] IN BLOOD BY AUTOMATED COUNT: 13.9 % (ref 11.5–14.5)
EST. GFR  (AFRICAN AMERICAN): 25.4 ML/MIN/1.73 M^2
EST. GFR  (NON AFRICAN AMERICAN): 22.1 ML/MIN/1.73 M^2
GLUCOSE SERPL-MCNC: 120 MG/DL (ref 70–110)
HCT VFR BLD AUTO: 32.1 % (ref 37–48.5)
HGB BLD-MCNC: 9.5 G/DL (ref 12–16)
MCH RBC QN AUTO: 32.5 PG (ref 27–31)
MCHC RBC AUTO-ENTMCNC: 29.6 G/DL (ref 32–36)
MCV RBC AUTO: 110 FL (ref 82–98)
PLATELET # BLD AUTO: 135 K/UL (ref 150–350)
PMV BLD AUTO: 10.2 FL (ref 9.2–12.9)
POTASSIUM SERPL-SCNC: 4.5 MMOL/L (ref 3.5–5.1)
RBC # BLD AUTO: 2.92 M/UL (ref 4–5.4)
SODIUM SERPL-SCNC: 139 MMOL/L (ref 136–145)
WBC # BLD AUTO: 4.75 K/UL (ref 3.9–12.7)

## 2020-12-15 PROCEDURE — 80048 BASIC METABOLIC PNL TOTAL CA: CPT

## 2020-12-15 PROCEDURE — 63600175 PHARM REV CODE 636 W HCPCS: Performed by: INTERNAL MEDICINE

## 2020-12-15 PROCEDURE — 25000003 PHARM REV CODE 250: Performed by: INTERNAL MEDICINE

## 2020-12-15 PROCEDURE — 85027 COMPLETE CBC AUTOMATED: CPT

## 2020-12-15 PROCEDURE — 63700000 PHARM REV CODE 250 ALT 637 W/O HCPCS: Performed by: INTERNAL MEDICINE

## 2020-12-15 PROCEDURE — 36415 COLL VENOUS BLD VENIPUNCTURE: CPT

## 2020-12-15 RX ORDER — PROMETHAZINE HYDROCHLORIDE 25 MG/1
25 TABLET ORAL 2 TIMES DAILY
Qty: 14 TABLET | Refills: 0 | Status: SHIPPED | OUTPATIENT
Start: 2020-12-15 | End: 2021-07-06 | Stop reason: SDUPTHER

## 2020-12-15 RX ORDER — MIRTAZAPINE 30 MG/1
30 TABLET, FILM COATED ORAL NIGHTLY
Qty: 30 TABLET | Refills: 2 | Status: ON HOLD | OUTPATIENT
Start: 2020-12-15 | End: 2021-04-20 | Stop reason: CLARIF

## 2020-12-15 RX ORDER — LIDOCAINE HYDROCHLORIDE 20 MG/ML
JELLY TOPICAL 2 TIMES DAILY
Qty: 30 ML | Refills: 1 | Status: SHIPPED | OUTPATIENT
Start: 2020-12-15 | End: 2020-12-22

## 2020-12-15 RX ORDER — FLUCONAZOLE 100 MG/1
100 TABLET ORAL DAILY
Qty: 7 TABLET | Refills: 0 | Status: SHIPPED | OUTPATIENT
Start: 2020-12-15 | End: 2020-12-22

## 2020-12-15 RX ORDER — SODIUM POLYSTYRENE SULFONATE 4.1 MEQ/G
15 POWDER, FOR SUSPENSION ORAL; RECTAL 4 TIMES DAILY PRN
Qty: 453.6 BOTTLE | Refills: 1 | Status: SHIPPED | OUTPATIENT
Start: 2020-12-15 | End: 2021-01-14

## 2020-12-15 RX ORDER — CEFUROXIME AXETIL 250 MG/1
250 TABLET ORAL 2 TIMES DAILY
Qty: 10 TABLET | Refills: 0 | Status: SHIPPED | OUTPATIENT
Start: 2020-12-15 | End: 2020-12-20

## 2020-12-15 RX ADMIN — DILTIAZEM HYDROCHLORIDE 120 MG: 120 CAPSULE, COATED, EXTENDED RELEASE ORAL at 09:12

## 2020-12-15 RX ADMIN — HYDROCODONE BITARTRATE AND ACETAMINOPHEN 1 TABLET: 10; 325 TABLET ORAL at 04:12

## 2020-12-15 RX ADMIN — FLUCONAZOLE 100 MG: 100 TABLET ORAL at 09:12

## 2020-12-15 RX ADMIN — CEFTRIAXONE SODIUM 1 G: 1 INJECTION, POWDER, FOR SOLUTION INTRAMUSCULAR; INTRAVENOUS at 09:12

## 2020-12-15 RX ADMIN — CHOLESTYRAMINE 4 G: 4 POWDER, FOR SUSPENSION ORAL at 09:12

## 2020-12-15 RX ADMIN — LEVOTHYROXINE SODIUM 88 MCG: 88 TABLET ORAL at 04:12

## 2020-12-15 RX ADMIN — LINEZOLID 600 MG: 600 TABLET, FILM COATED ORAL at 09:12

## 2020-12-15 RX ADMIN — SODIUM BICARBONATE 650 MG TABLET 650 MG: at 09:12

## 2020-12-15 NOTE — NURSING
Pt dced home. Instructions and teaching given. Pt verbalized understanding. Pt ambulated outside w/ Shannan, charge nurse.

## 2020-12-15 NOTE — NURSING
Discharge instructions given to patient, understanding demonstrated. IV catheter removed with catheter fully intact. Belongings placed in personal belongings bag including clothes, phone , and hygiene supplies. Awaiting ride to come get her.

## 2020-12-15 NOTE — DISCHARGE SUMMARY
[facility]  Discharge Summary      Patient Name: Shara Hutchins  MRN: [id number]  Admission Date: 12/8/2020  Hospital Length of Stay: 5 days  Discharge Date and Time:  12/15/2020 8:13 AM  Attending Physician: Ashwin Diaz MD   Discharging Provider: Ashwin Diaz MD  Primary Care Provider: April Horton MD      HPI:   History of Present Illness / Brief Hospital Course:  Shara Hutchins is a 70 y.o. female who  has a past medical history of Chronic kidney disease, stage III (moderate) (1/7/2018), Diabetes mellitus, type 2, Fatigue, Hemodialysis patient, Hypertension, Iron deficiency anemia due to chronic blood loss (1/7/2018), and Vulvar cancer (1/7/2019).. The patient presented to the on 12/8/2020 with a primary complaint of BUTTOCK AND VAGINAL PAIN (S/P RADIATION), COLD S/S, Chills, and Cough     She has chronic radiation changes to her vulva and perineum which are intermittently bothersome for her. She has chronic diarrhea and is often incontinent of urine and stool.   Currently, she says she has had worsening pain and erythema, which are now severe, to the area over the past couple of days and thinks she is developing an infection. She has had some chills but denies fevers.   She also c/o of a dry cough for the past few days.   Denies chest pain, sick contacts, diaphoresis, lightheadedness, syncope.   She was started on meropenem and vancomycin in the ED.     * No surgery found *      Hospital Course:   12/9/2020  Pt has a considerable amount of perianal pain. She has a short gut and chronic diarrhea. Her perianal pain was 10/10. She states that she saw Dr Neal this AM.    12/10/2020  Ms Hutchins is feeling better with less perianal pain.    12/11/2020  Pt still has some abdominal discomfort 4-6/10 with less diarrhea. Overall she is feeling better in the anal region.    12/12/2020  Pt has a sore throat and would like chloraseptic spray. My bottom still hurt but is better and the  diarrhea is the same.    12/13/2020  Pt did not tolerate linazolide due to lower extremity swelling after the dose. She would like to restart her Zoloft     12/14/2020  Ms Hutchins notes that she is volume overloaded for IV medication. He perianal pain has improved.    12/15/2020  Dr Neal feels that I can go home today. My bottom hurts much less and my diarrhea has slowed down. Dr Neal gave me antibiotic prescriptions last night. I will go to dialysis in the AM and I will call them now.  ROS: no fever or chills, HEENT no complaint my mouth is not Dry, No complaint: neck, pulmonary heart abdomen  EXT or neuro Perianal pain 4/10 or less, diarrheic stools reduced  PE: in no distress HEENT CLAYTON EOM intact moist mucus membranes Neck supple no use of accessory muscles Heart S 4 S 1 S 2 No S3 RRR, Abdomen BS+ nontender Ext without CC or E pulses 1-2+. Skin perianal area with decreased edema Neuro no overt sensory or motor deficit     Consults:   Consults (From admission, onward)        Status Ordering Provider     Inpatient consult to Hospitalist  Once     Provider:  Nava Hurst MD    Acknowledged NAVA HURST     Inpatient consult to Infectious Diseases  Once     Provider:  Alla Neal MD    Completed NAVA HURST     Inpatient consult to Nephrology  Once     Provider:  Raúl Dietz MD    Completed NAVA HURST.          No new Assessment & Plan notes have been filed under this hospital service since the last note was generated.  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    Specialty: Internal Medicine  Contact information:  [address]  [place] [address]  [phone]             Alla Neal MD.    Specialty: Infectious Diseases  Why: If no improvement  Contact information:  [address]  [address]  [place] [address]  [phone]                 Patient Instructions:      Diet diabetic     Diet renal     Activity as tolerated       Significant Diagnostic Studies: Labs:   BMP: No results for input(s): GLU, NA, K, CL, CO2, BUN, CREATININE, CALCIUM, MG in the last 48 hours., CMP No results for input(s): NA, K, CL, CO2, GLU, BUN, CREATININE, CALCIUM, PROT, ALBUMIN, BILITOT, ALKPHOS, AST, ALT, ANIONGAP, ESTGFRAFRICA, EGFRNONAA in the last 48 hours., CBC No results for input(s): WBC, HGB, HCT, PLT in the last 48 hours., INR   Lab Results   Component Value Date    INR 1.6 12/09/2020    INR 1.8 12/08/2020    INR 1.3 06/14/2020   , Troponin   Recent Labs   Lab 12/08/20  1833   TROPONINI <0.030    and All labs within the past 24 hours have been reviewed  Microbiology:   Blood Culture   Lab Results   Component Value Date    LABBLOO  12/08/2020     Gram stain aer bottle: Gram positive cocci in clusters resembling Staph    LABBLOO  12/08/2020     Results called to and read back by: Keri Blount on 1 west by ANA LAURA    LABBLOO 12/10/2020  10:53 12/08/2020    LABBLOO (A) 12/08/2020     MICROCOCCUS SPECIES  Organism is a probable contaminant      and Urine Culture    Lab Results   Component Value Date    LABURIN  12/09/2020     Multiple organisms isolated. None in predominance.  Repeat if    LABURIN clinically necessary. 12/09/2020       Pending Diagnostic Studies:     Procedure Component Value Units Date/Time    Basic metabolic panel  [[id number]]     Order Status: Sent Lab Status: No result     Specimen: Blood     CBC Without Differential [[id number]]     Order Status: Sent Lab Status: No result     Specimen: Blood     Lactic Acid, Plasma [[id number]] Collected: 12/08/20 1833    Order Status: Sent Lab Status: In process Updated: 12/08/20 1836    Specimen: Blood          Medications:  Reconciled Home Medications:      Medication List      START taking these medications    cefUROXime 250 MG tablet  Commonly known as: CEFTIN  Take 1 tablet (250 mg total) by mouth 2 (two) times daily. for 5 days     fluconazole 100 MG tablet  Commonly known as: DIFLUCAN  Take 1 tablet (100 mg total) by mouth once daily. for 7 days     lidocaine HCL 2% 2 % jelly  Commonly known as: XYLOCAINE  Apply topically 2 (two) times daily. for 7 days        CHANGE how you take these medications    levothyroxine 88 MCG tablet  Commonly known as: SYNTHROID  TAKE 1 TABLET DAILY  What changed: when to take this     sertraline 50 MG tablet  Commonly known as: ZOLOFT  Take 50 mg by mouth every evening.  What changed: Another medication with the same name was removed. Continue taking this medication, and follow the directions you see here.     sodium polystyrene powder  Commonly known as: KAYEXALATE  Take 15 g by mouth 4 (four) times daily as needed (diarrhea).  What changed: See the new instructions.        CONTINUE taking these medications    diltiaZEM 120 MG Cp24  Commonly known as: CARDIZEM CD  TAKE 1 CAPSULE DAILY     ferrous gluconate 324 MG tablet  Commonly known as: FERGON  Take 324 mg by mouth daily with breakfast.     mirtazapine 30 MG tablet  Commonly known as: REMERON  Take 1 tablet (30 mg total) by mouth every evening.     promethazine 25 MG tablet  Commonly known as: PHENERGAN  Take 1 tablet (25 mg total) by mouth 2 (two) times a day.     sodium bicarbonate 325 MG tablet  Take 2 tablets (650 mg total) by mouth 2 (two) times daily.     traMADoL 50 mg tablet  Commonly known  as: ULTRAM  Take 1 tablet (50 mg total) by mouth every 4 (four) hours as needed. for pain.        STOP taking these medications    calcitRIOL 0.5 MCG Cap  Commonly known as: ROCALTROL     cholestyramine-aspartame 4 gram Pwpk  Commonly known as: QUESTRAN LIGHT     ferrous sulfate 324 mg (65 mg iron) Tbec     magnesium oxide 400 mg (241.3 mg magnesium) tablet  Commonly known as: MAG-OX     megestroL 400 mg/10 mL (40 mg/mL) Susp  Commonly known as: MEGACE            Indwelling Lines/Drains at time of discharge:   Lines/Drains/Airways     Drain                 Hemodialysis AV Fistula Left upper arm -- days                Time spent on the discharge of patient: 33 minutes  Patient was seen and examined on the date of discharge and determined to be suitable for discharge.         Ashwin Diaz MD  Department of Hospital Medicine  [facility]

## 2020-12-15 NOTE — PLAN OF CARE
12/15/20 0905   Final Note   Assessment Type Final Discharge Note   Anticipated Discharge Disposition Home   Reviewed chart and no needs at this time.

## 2020-12-15 NOTE — PROGRESS NOTES
Consult Note  Nephrology    Consult Requested By: Ashwin Diaz MD    Reason for Consult: ESRD    SUBJECTIVE:     History of Present Illness:    70F with ESRD on HD MWF admitted w/UTI, cellulitis.    12/10  No nausea, chest pain, sob, fever, urinary or bowel complaint, new neurologic symptoms, new joint pain,  12/11  Stopped hd 30 minutes early due to clotting the system  12/12  No chest pain or sob.  No distress  12/13  Had sob last pm.  None this am.  No nausea, chest pain  12/14 VSS, no new complains. HD today.  12/15 VSS, no new complains. OK to dc per primary team and ID.    Assessment/plan:    ESRD  DM 2  HTN  Anemia of chronic dz  SHPT  Cellulitis of perineum, recurrent  UTI    --continue HD on pt's schedule, MWF  --correct K+ w/HD  --Blood glucose control per primary team.  --give ADAM with hd as needed  --PO4 at goal, not on binders  --abx per ID and primary team    Past Medical History:   Diagnosis Date    Cellulitis of trunk     Perineum, numerous episodes    Chronic diarrhea 02/10/2020    Short-gut syndrome    Diabetes mellitus, type 2     pt has not had for years after losing weight    End stage renal disease on dialysis 01/07/2018    Fatigue     Hypertension     Hypothyroidism 2/10/2020    Iron deficiency anemia due to chronic blood loss 1/7/2018    Pulmonary hypertension 3/20/2020    Vulvar cancer 1/7/2019     Past Surgical History:   Procedure Laterality Date    ABDOMINAL SURGERY      APPENDECTOMY      AV FISTULA PLACEMENT Left 2018    BACK SURGERY      BLADDER FULGURATION N/A 6/16/2020    Procedure: FULGURATION, BLADDER;  Surgeon: Damari Barber Jr., MD;  Location: [facility] OR;  Service: Urology;  Laterality: N/A;    carpel tunnel surgery once on each hand      CERVICAL FUSION      x 4    cervix repair      CHOLECYSTECTOMY  2000    COLECTOMY      CYSTOSCOPY W/ RETROGRADES  6/16/2020    Procedure: CYSTOSCOPY, WITH RETROGRADE PYELOGRAM;  Surgeon: Damari Barber Jr., MD;  Location:  [facility] OR;  Service: Urology;;    DIAGNOSTIC LAPAROSCOPY N/A 2020    Procedure: LAPAROSCOPY, DIAGNOSTIC;  Surgeon: Robbi Lovell III, MD;  Location: [facility] OR;  Service: General;  Laterality: N/A;    HYSTERECTOMY  1980    ILEOSTOMY N/A 2020    Procedure: CREATION, ILEOSTOMY Loop;  Surgeon: Robbi Lovell III, MD;  Location: [facility] OR;  Service: General;  Laterality: N/A;    ILEOSTOMY CLOSURE N/A 2020    Procedure: CLOSURE, ILEOSTOMY;  Surgeon: Robbi Lovell III, MD;  Location: [facility] OR;  Service: General;  Laterality: N/A;    LUMBAR LAMINECTOMY      LYSIS OF ADHESIONS N/A 2020    Procedure: LYSIS, ADHESIONS;  Surgeon: Robbi Lovell III, MD;  Location: [facility] OR;  Service: General;  Laterality: N/A;    NECK SURGERY      PHLEBOGRAPHY  2020    Procedure: VENOGRAM;  Surgeon: Robbi Lovell III, MD;  Location: [facility];  Service: General;;    REMOVAL OF VASCULAR ACCESS PORT Right 2020    Procedure: REMOVAL, VASCULAR ACCESS PORT;  Surgeon: Robbi Lovell III, MD;  Location: [facility];  Service: General;  Laterality: Right;    SHOULDER SURGERY      vulva cancer       Family History   Problem Relation Age of Onset    Heart disease Mother     Hypertension Mother     Cancer Mother         lung    Heart disease Father     Heart disease Sister         Open heart surgery    No Known Problems Daughter      Social History     Tobacco Use    Smoking status: Former Smoker     Packs/day: 1.00     Years: 33.00     Pack years: 33.00     Types: Cigarettes     Quit date: 2000     Years since quittin.4    Smokeless tobacco: Former User     Quit date:    Substance Use Topics    Alcohol use: No    Drug use: No       Review of patient's allergies indicates:   Allergen Reactions    Ciprofloxacin Other (See Comments)     Elevated liver enzymes      Pcn [penicillins] Anaphylaxis     TOLERATES ROCEPHIN WITHOUT DIFFICULTY       OBJECTIVE:     Vital Signs Range (Last  24H):  Temp:  [97.9 °F (36.6 °C)-98.5 °F (36.9 °C)]   Pulse:  [61-77]   Resp:  [16-18]   BP: (132-162)/(66-78)   SpO2:  [95 %-98 %]     Physical Exam:  General- NAD noted  HEENT- WNL  Neck- supple  CV- Regular rate and rhythm  Resp- crackles present  GI- Non tender/non-distended, BS normoactive x4 quads  Extrem- No cyanosis, clubbing, edema.  Derm- skin w/d  Neuro-  No flap.     Body mass index is 20.86 kg/m².    Laboratory:  Recent Labs   Lab 12/11/20  0511 12/12/20  0502 12/13/20  0643    142 140   K 4.3 4.2 4.3   * 112* 113*   CO2 19* 22* 23   BUN 17 13 21   CREATININE 2.7* 2.1* 2.5*   ESTGFRAFRICA 19.8* 26.9* 21.8*   EGFRNONAA 17.2* 23.3* 18.9*   GLU 74 124* 75       Recent Labs   Lab 12/08/20  1833  12/11/20  0511 12/12/20  0502 12/13/20  0643   CALCIUM 8.8   < > 7.6* 8.6* 7.9*   ALBUMIN 3.9  --   --   --   --    PHOS 3.5  --   --  4.0  --    MG 1.5*   < > 1.5* 1.4* 1.9    < > = values in this interval not displayed.       Recent Labs   Lab 09/30/19  0820 11/15/19  0748   PTH, Intact 176.4 H 238.4 H       No results for input(s): POCTGLUCOSE in the last 168 hours.    Recent Labs   Lab 07/11/19  0806 11/15/19  0748 01/22/20  0848   Hemoglobin A1C 5.1 Invalid A SEE COMMENT       Recent Labs   Lab 12/11/20  0511 12/12/20  0502 12/13/20  0643   WBC 5.14 3.66* 5.57   HGB 9.5* 10.1* 9.8*   HCT 32.1* 33.9* 33.1*   * 143* 149*   * 111* 114*   MCHC 29.6* 29.8* 29.6*   MONO 9.5  0.5 1.6*  0.1* 7.2  0.4       Recent Labs   Lab 12/08/20  1833   BILITOT 0.9   PROT 6.4   ALBUMIN 3.9   ALKPHOS 135   ALT 18   AST 18       Recent Labs   Lab 06/14/20  1305 12/08/20  2053 12/09/20  2323   Color, UA Red A Orange A Yellow   Appearance, UA Cloudy A Cloudy A Cloudy A   pH, UA 7.0 7.0 6.0   Specific Universal, UA 1.015 1.010 1.020   Protein, UA SEE COMMENT 2+ A 1+ A   Glucose, UA SEE COMMENT Negative Negative   Ketones, UA SEE COMMENT Negative Negative   Urobilinogen, UA SEE COMMENT Negative Negative    Bilirubin (UA) SEE COMMENT Negative Negative   Occult Blood UA SEE COMMENT 3+ A 3+ A   Nitrite, UA SEE COMMENT Negative Negative   RBC, UA >100 H >100 H 1   WBC, UA 0 >100 H >100 H   Bacteria Negative Many A Negative   Hyaline Casts, UA 3 A 28 A 10 A       Recent Labs   Lab 03/16/20  1551 12/08/20 2024   POC PH 7.402 7.370   POC PCO2 39.9 39.0   POC HCO3 24.9 22.5 L   POC PO2 58 LL 85   POC SATURATED O2 90 L 96   POC BE 0 -3   Sample ARTERIAL ARTERIAL         ASSESSMENT/PLAN:     Active Hospital Problems    Diagnosis  POA    *Perianal cellulitis [K61.0]  Yes     Numerous episodes      Pneumonia due to infectious organism [J18.9]  Yes    Perineal rash, female [R21]  Yes    Anemia of chronic disease [D63.8]  Yes     Chronic    Chronic diarrhea [K52.9]  Yes     Chronic    Anemia due to stage 5 chronic kidney disease treated with erythropoietin [N18.5, D63.1]  Yes    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     Thank you for allowing us to participate in the care of your patient. We will follow the patient and provide recommendations as needed.    Patient care was time spent personally by me on the following activities:   · Obtaining a history  · Examination of patient.  · Providing medical care at the patients bedside.  · Developing a treatment plan with patient or surrogate and bedside caregivers  · Ordering and reviewing laboratory studies, radiographic studies, pulse oximetry.  · Ordering and performing treatments and interventions.  · Evaluation of patient's response to treatment.  · Discussions with consultants while on the unit and immediately available to the patient.  · Re-evaluation of the patient's condition.  · Documentation in the medical record.     Jorge Webb MD  Nephrology  [facility]  [phone]

## 2020-12-16 ENCOUNTER — TELEPHONE (OUTPATIENT)
Dept: FAMILY MEDICINE | Facility: CLINIC | Age: 70
End: 2020-12-16

## 2020-12-16 NOTE — TELEPHONE ENCOUNTER
Pt rtn'd call to schedule HFU.  Dr. Horton out and pt does not want to see a NP.  Pt has dialysis Mon, Wed and Fri.  HFU scheduled for Thurs 1/7/20.

## 2020-12-16 NOTE — TELEPHONE ENCOUNTER
DC Date 12/15/20  Reached out to schedule HFU.  Unable to schedule at time of call LVM.  C3 notified.

## 2021-01-01 ENCOUNTER — PATIENT MESSAGE (OUTPATIENT)
Dept: FAMILY MEDICINE | Facility: CLINIC | Age: 71
End: 2021-01-01
Payer: OTHER GOVERNMENT

## 2021-01-01 ENCOUNTER — TELEPHONE (OUTPATIENT)
Dept: FAMILY MEDICINE | Facility: CLINIC | Age: 71
End: 2021-01-01
Payer: OTHER GOVERNMENT

## 2021-02-01 ENCOUNTER — HOSPITAL ENCOUNTER (EMERGENCY)
Facility: HOSPITAL | Age: 71
Discharge: HOME | End: 2021-02-01
Attending: EMERGENCY MEDICINE
Payer: MEDICARE

## 2021-02-01 VITALS
HEIGHT: 68 IN | WEIGHT: 135 LBS | DIASTOLIC BLOOD PRESSURE: 58 MMHG | RESPIRATION RATE: 14 BRPM | HEART RATE: 89 BPM | SYSTOLIC BLOOD PRESSURE: 123 MMHG | OXYGEN SATURATION: 97 % | BODY MASS INDEX: 20.46 KG/M2 | TEMPERATURE: 98 F

## 2021-02-01 DIAGNOSIS — Z78.9 PROBLEM WITH VASCULAR ACCESS: Primary | ICD-10-CM

## 2021-02-01 LAB
ABO + RH BLD: NORMAL
ALBUMIN SERPL BCP-MCNC: 3.9 G/DL (ref 3.5–5.2)
ALP SERPL-CCNC: 107 U/L (ref 55–135)
ALT SERPL W/O P-5'-P-CCNC: 15 U/L (ref 10–44)
ANION GAP SERPL CALC-SCNC: 15 MMOL/L (ref 8–16)
AST SERPL-CCNC: 21 U/L (ref 10–40)
BASOPHILS # BLD AUTO: 0.02 K/UL (ref 0–0.2)
BASOPHILS NFR BLD: 0.3 % (ref 0–1.9)
BILIRUB SERPL-MCNC: 0.9 MG/DL (ref 0.1–1)
BLD GP AB SCN CELLS X3 SERPL QL: NORMAL
BUN SERPL-MCNC: 11 MG/DL (ref 8–23)
CALCIUM SERPL-MCNC: 8.6 MG/DL (ref 8.7–10.5)
CHLORIDE SERPL-SCNC: 97 MMOL/L (ref 95–110)
CO2 SERPL-SCNC: 29 MMOL/L (ref 23–29)
CREAT SERPL-MCNC: 2.4 MG/DL (ref 0.5–1.4)
DIFFERENTIAL METHOD: ABNORMAL
EOSINOPHIL # BLD AUTO: 0.2 K/UL (ref 0–0.5)
EOSINOPHIL NFR BLD: 2.9 % (ref 0–8)
ERYTHROCYTE [DISTWIDTH] IN BLOOD BY AUTOMATED COUNT: 14.5 % (ref 11.5–14.5)
EST. GFR  (AFRICAN AMERICAN): 22.9 ML/MIN/1.73 M^2
EST. GFR  (NON AFRICAN AMERICAN): 19.9 ML/MIN/1.73 M^2
GLUCOSE SERPL-MCNC: 101 MG/DL (ref 70–110)
HCT VFR BLD AUTO: 36.9 % (ref 37–48.5)
HGB BLD-MCNC: 11.8 G/DL (ref 12–16)
IMM GRANULOCYTES # BLD AUTO: 0.01 K/UL (ref 0–0.04)
IMM GRANULOCYTES NFR BLD AUTO: 0.2 % (ref 0–0.5)
INR PPP: 1.3
LYMPHOCYTES # BLD AUTO: 1.1 K/UL (ref 1–4.8)
LYMPHOCYTES NFR BLD: 19 % (ref 18–48)
MCH RBC QN AUTO: 33.6 PG (ref 27–31)
MCHC RBC AUTO-ENTMCNC: 32 G/DL (ref 32–36)
MCV RBC AUTO: 105 FL (ref 82–98)
MONOCYTES # BLD AUTO: 0.5 K/UL (ref 0.3–1)
MONOCYTES NFR BLD: 7.7 % (ref 4–15)
NEUTROPHILS # BLD AUTO: 4.2 K/UL (ref 1.8–7.7)
NEUTROPHILS NFR BLD: 69.9 % (ref 38–73)
NRBC BLD-RTO: 0 /100 WBC
PLATELET # BLD AUTO: 179 K/UL (ref 150–350)
PMV BLD AUTO: 9.7 FL (ref 9.2–12.9)
POTASSIUM SERPL-SCNC: 3.6 MMOL/L (ref 3.5–5.1)
PROT SERPL-MCNC: 6.4 G/DL (ref 6–8.4)
PROTHROMBIN TIME: 15.4 SEC (ref 10.6–14.8)
RBC # BLD AUTO: 3.51 M/UL (ref 4–5.4)
SODIUM SERPL-SCNC: 141 MMOL/L (ref 136–145)
WBC # BLD AUTO: 5.94 K/UL (ref 3.9–12.7)

## 2021-02-01 PROCEDURE — 99283 EMERGENCY DEPT VISIT LOW MDM: CPT | Mod: 25

## 2021-02-01 PROCEDURE — 85025 COMPLETE CBC W/AUTO DIFF WBC: CPT

## 2021-02-01 PROCEDURE — 25000003 PHARM REV CODE 250: Performed by: STUDENT IN AN ORGANIZED HEALTH CARE EDUCATION/TRAINING PROGRAM

## 2021-02-01 PROCEDURE — 85610 PROTHROMBIN TIME: CPT

## 2021-02-01 PROCEDURE — 36415 COLL VENOUS BLD VENIPUNCTURE: CPT

## 2021-02-01 PROCEDURE — 86900 BLOOD TYPING SEROLOGIC ABO: CPT

## 2021-02-01 PROCEDURE — 80053 COMPREHEN METABOLIC PANEL: CPT

## 2021-02-01 RX ORDER — LIDOCAINE HYDROCHLORIDE AND EPINEPHRINE 10; 10 MG/ML; UG/ML
10 INJECTION, SOLUTION INFILTRATION; PERINEURAL ONCE
Status: COMPLETED | OUTPATIENT
Start: 2021-02-01 | End: 2021-02-01

## 2021-02-01 RX ADMIN — LIDOCAINE HYDROCHLORIDE AND EPINEPHRINE 10 ML: 10; 10 INJECTION, SOLUTION INFILTRATION; PERINEURAL at 07:02

## 2021-02-04 ENCOUNTER — HOSPITAL ENCOUNTER (EMERGENCY)
Facility: HOSPITAL | Age: 71
Discharge: HOME | End: 2021-02-04
Attending: EMERGENCY MEDICINE
Payer: MEDICARE

## 2021-02-04 VITALS
OXYGEN SATURATION: 96 % | WEIGHT: 138 LBS | RESPIRATION RATE: 16 BRPM | BODY MASS INDEX: 21.66 KG/M2 | SYSTOLIC BLOOD PRESSURE: 145 MMHG | TEMPERATURE: 99 F | HEIGHT: 67 IN | HEART RATE: 89 BPM | DIASTOLIC BLOOD PRESSURE: 72 MMHG

## 2021-02-04 DIAGNOSIS — M79.602 PAIN OF LEFT UPPER EXTREMITY: Primary | ICD-10-CM

## 2021-02-04 DIAGNOSIS — M79.89 LEFT ARM SWELLING: ICD-10-CM

## 2021-02-04 PROCEDURE — 99284 EMERGENCY DEPT VISIT MOD MDM: CPT | Mod: 25

## 2021-02-09 DIAGNOSIS — K13.79 MOUTH PAIN: ICD-10-CM

## 2021-02-09 RX ORDER — TRAMADOL HYDROCHLORIDE 50 MG/1
50 TABLET ORAL EVERY 4 HOURS PRN
Qty: 42 TABLET | Refills: 0 | Status: SHIPPED | OUTPATIENT
Start: 2021-02-09 | End: 2021-05-17 | Stop reason: CLARIF

## 2021-03-11 DIAGNOSIS — K13.79 MOUTH PAIN: ICD-10-CM

## 2021-03-11 RX ORDER — TRAMADOL HYDROCHLORIDE 50 MG/1
50 TABLET ORAL EVERY 4 HOURS PRN
Qty: 42 TABLET | Refills: 0 | OUTPATIENT
Start: 2021-03-11

## 2021-03-15 ENCOUNTER — HOSPITAL ENCOUNTER (INPATIENT)
Facility: HOSPITAL | Age: 71
LOS: 2 days | Discharge: HOME | DRG: 660 | End: 2021-03-17
Attending: EMERGENCY MEDICINE | Admitting: INTERNAL MEDICINE
Payer: MEDICARE

## 2021-03-15 DIAGNOSIS — G62.9 NEUROPATHY: ICD-10-CM

## 2021-03-15 DIAGNOSIS — I10 ESSENTIAL HYPERTENSION: ICD-10-CM

## 2021-03-15 DIAGNOSIS — N13.30 HYDRONEPHROSIS, UNSPECIFIED HYDRONEPHROSIS TYPE: ICD-10-CM

## 2021-03-15 DIAGNOSIS — N20.0 NEPHROLITHIASIS: Primary | ICD-10-CM

## 2021-03-15 DIAGNOSIS — K59.00 CONSTIPATION, UNSPECIFIED CONSTIPATION TYPE: ICD-10-CM

## 2021-03-15 DIAGNOSIS — R07.9 CHEST PAIN: ICD-10-CM

## 2021-03-15 DIAGNOSIS — N18.6 ESRD (END STAGE RENAL DISEASE): Chronic | ICD-10-CM

## 2021-03-15 DIAGNOSIS — N12 PYELONEPHRITIS: ICD-10-CM

## 2021-03-15 DIAGNOSIS — E03.9 HYPOTHYROIDISM, UNSPECIFIED TYPE: ICD-10-CM

## 2021-03-15 DIAGNOSIS — I27.20 PULMONARY HYPERTENSION: ICD-10-CM

## 2021-03-15 DIAGNOSIS — N13.30 HYDRONEPHROSIS: ICD-10-CM

## 2021-03-15 DIAGNOSIS — E44.0 MODERATE PROTEIN-CALORIE MALNUTRITION: ICD-10-CM

## 2021-03-15 PROBLEM — E87.6 HYPOKALEMIA: Status: ACTIVE | Noted: 2021-03-15

## 2021-03-15 PROBLEM — N23 RENAL COLIC ON RIGHT SIDE: Status: ACTIVE | Noted: 2021-03-15

## 2021-03-15 PROBLEM — D53.9 MACROCYTIC ANEMIA: Chronic | Status: ACTIVE | Noted: 2021-03-15

## 2021-03-15 LAB
ALBUMIN SERPL BCP-MCNC: 3.6 G/DL (ref 3.5–5.2)
ALP SERPL-CCNC: 86 U/L (ref 55–135)
ALT SERPL W/O P-5'-P-CCNC: 9 U/L (ref 10–44)
ANION GAP SERPL CALC-SCNC: 12 MMOL/L (ref 8–16)
AST SERPL-CCNC: 13 U/L (ref 10–40)
BACTERIA #/AREA URNS HPF: NEGATIVE /HPF
BASOPHILS # BLD AUTO: 0.01 K/UL (ref 0–0.2)
BASOPHILS NFR BLD: 0.1 % (ref 0–1.9)
BILIRUB SERPL-MCNC: 1.3 MG/DL (ref 0.1–1)
BILIRUB UR QL STRIP: NEGATIVE
BUN SERPL-MCNC: 19 MG/DL (ref 8–23)
CALCIUM SERPL-MCNC: 8.3 MG/DL (ref 8.7–10.5)
CHLORIDE SERPL-SCNC: 95 MMOL/L (ref 95–110)
CLARITY UR: ABNORMAL
CO2 SERPL-SCNC: 30 MMOL/L (ref 23–29)
COLOR UR: YELLOW
CREAT SERPL-MCNC: 3.1 MG/DL (ref 0.5–1.4)
DIFFERENTIAL METHOD: ABNORMAL
EOSINOPHIL # BLD AUTO: 0 K/UL (ref 0–0.5)
EOSINOPHIL NFR BLD: 0.4 % (ref 0–8)
ERYTHROCYTE [DISTWIDTH] IN BLOOD BY AUTOMATED COUNT: 13.7 % (ref 11.5–14.5)
EST. GFR  (AFRICAN AMERICAN): 16.8 ML/MIN/1.73 M^2
EST. GFR  (NON AFRICAN AMERICAN): 14.6 ML/MIN/1.73 M^2
GLUCOSE SERPL-MCNC: 99 MG/DL (ref 70–110)
GLUCOSE UR QL STRIP: NEGATIVE
HCT VFR BLD AUTO: 33.6 % (ref 37–48.5)
HGB BLD-MCNC: 11 G/DL (ref 12–16)
HGB UR QL STRIP: ABNORMAL
HYALINE CASTS #/AREA URNS LPF: 40 /LPF
IMM GRANULOCYTES # BLD AUTO: 0.05 K/UL (ref 0–0.04)
IMM GRANULOCYTES NFR BLD AUTO: 0.6 % (ref 0–0.5)
KETONES UR QL STRIP: NEGATIVE
LEUKOCYTE ESTERASE UR QL STRIP: ABNORMAL
LYMPHOCYTES # BLD AUTO: 0.5 K/UL (ref 1–4.8)
LYMPHOCYTES NFR BLD: 6.1 % (ref 18–48)
MCH RBC QN AUTO: 33.4 PG (ref 27–31)
MCHC RBC AUTO-ENTMCNC: 32.7 G/DL (ref 32–36)
MCV RBC AUTO: 102 FL (ref 82–98)
MICROSCOPIC COMMENT: ABNORMAL
MONOCYTES # BLD AUTO: 0.7 K/UL (ref 0.3–1)
MONOCYTES NFR BLD: 8.7 % (ref 4–15)
NEUTROPHILS # BLD AUTO: 6.6 K/UL (ref 1.8–7.7)
NEUTROPHILS NFR BLD: 84.1 % (ref 38–73)
NITRITE UR QL STRIP: NEGATIVE
NRBC BLD-RTO: 0 /100 WBC
PH UR STRIP: 7 [PH] (ref 5–8)
PLATELET # BLD AUTO: 165 K/UL (ref 150–350)
PMV BLD AUTO: 9.7 FL (ref 9.2–12.9)
POTASSIUM SERPL-SCNC: 2.9 MMOL/L (ref 3.5–5.1)
PROT SERPL-MCNC: 6.5 G/DL (ref 6–8.4)
PROT UR QL STRIP: ABNORMAL
RBC # BLD AUTO: 3.29 M/UL (ref 4–5.4)
RBC #/AREA URNS HPF: 21 /HPF (ref 0–4)
SARS-COV-2 RDRP RESP QL NAA+PROBE: NEGATIVE
SODIUM SERPL-SCNC: 137 MMOL/L (ref 136–145)
SP GR UR STRIP: 1.01 (ref 1–1.03)
SQUAMOUS #/AREA URNS HPF: 10 /HPF
URN SPEC COLLECT METH UR: ABNORMAL
UROBILINOGEN UR STRIP-ACNC: NEGATIVE EU/DL
WBC # BLD AUTO: 7.82 K/UL (ref 3.9–12.7)
WBC #/AREA URNS HPF: >100 /HPF (ref 0–5)

## 2021-03-15 PROCEDURE — 99285 EMERGENCY DEPT VISIT HI MDM: CPT | Mod: 25

## 2021-03-15 PROCEDURE — 25000003 PHARM REV CODE 250: Performed by: STUDENT IN AN ORGANIZED HEALTH CARE EDUCATION/TRAINING PROGRAM

## 2021-03-15 PROCEDURE — 87086 URINE CULTURE/COLONY COUNT: CPT | Performed by: EMERGENCY MEDICINE

## 2021-03-15 PROCEDURE — 87186 SC STD MICRODIL/AGAR DIL: CPT | Performed by: EMERGENCY MEDICINE

## 2021-03-15 PROCEDURE — U0002 COVID-19 LAB TEST NON-CDC: HCPCS | Performed by: INTERNAL MEDICINE

## 2021-03-15 PROCEDURE — 81001 URINALYSIS AUTO W/SCOPE: CPT | Performed by: EMERGENCY MEDICINE

## 2021-03-15 PROCEDURE — 87077 CULTURE AEROBIC IDENTIFY: CPT | Performed by: EMERGENCY MEDICINE

## 2021-03-15 PROCEDURE — 12000002 HC ACUTE/MED SURGE SEMI-PRIVATE ROOM

## 2021-03-15 PROCEDURE — 80053 COMPREHEN METABOLIC PANEL: CPT | Performed by: EMERGENCY MEDICINE

## 2021-03-15 PROCEDURE — 96375 TX/PRO/DX INJ NEW DRUG ADDON: CPT

## 2021-03-15 PROCEDURE — 36415 COLL VENOUS BLD VENIPUNCTURE: CPT | Performed by: STUDENT IN AN ORGANIZED HEALTH CARE EDUCATION/TRAINING PROGRAM

## 2021-03-15 PROCEDURE — 63600175 PHARM REV CODE 636 W HCPCS: Performed by: INTERNAL MEDICINE

## 2021-03-15 PROCEDURE — 87040 BLOOD CULTURE FOR BACTERIA: CPT | Performed by: STUDENT IN AN ORGANIZED HEALTH CARE EDUCATION/TRAINING PROGRAM

## 2021-03-15 PROCEDURE — 85025 COMPLETE CBC W/AUTO DIFF WBC: CPT | Performed by: EMERGENCY MEDICINE

## 2021-03-15 PROCEDURE — 96365 THER/PROPH/DIAG IV INF INIT: CPT

## 2021-03-15 PROCEDURE — 36415 COLL VENOUS BLD VENIPUNCTURE: CPT | Performed by: EMERGENCY MEDICINE

## 2021-03-15 PROCEDURE — 63600175 PHARM REV CODE 636 W HCPCS: Performed by: STUDENT IN AN ORGANIZED HEALTH CARE EDUCATION/TRAINING PROGRAM

## 2021-03-15 PROCEDURE — 96376 TX/PRO/DX INJ SAME DRUG ADON: CPT

## 2021-03-15 RX ORDER — ACETAMINOPHEN 325 MG/1
650 TABLET ORAL EVERY 4 HOURS PRN
Status: DISCONTINUED | OUTPATIENT
Start: 2021-03-15 | End: 2021-03-17 | Stop reason: HOSPADM

## 2021-03-15 RX ORDER — SODIUM CHLORIDE 0.9 % (FLUSH) 0.9 %
10 SYRINGE (ML) INJECTION EVERY 6 HOURS PRN
Status: DISCONTINUED | OUTPATIENT
Start: 2021-03-15 | End: 2021-03-17 | Stop reason: HOSPADM

## 2021-03-15 RX ORDER — DILTIAZEM HYDROCHLORIDE 120 MG/1
120 CAPSULE, COATED, EXTENDED RELEASE ORAL DAILY
Status: DISCONTINUED | OUTPATIENT
Start: 2021-03-16 | End: 2021-03-17 | Stop reason: HOSPADM

## 2021-03-15 RX ORDER — TAMSULOSIN HYDROCHLORIDE 0.4 MG/1
0.4 CAPSULE ORAL DAILY
Status: DISCONTINUED | OUTPATIENT
Start: 2021-03-15 | End: 2021-03-17 | Stop reason: HOSPADM

## 2021-03-15 RX ORDER — MORPHINE SULFATE 2 MG/ML
2 INJECTION, SOLUTION INTRAMUSCULAR; INTRAVENOUS
Status: COMPLETED | OUTPATIENT
Start: 2021-03-15 | End: 2021-03-15

## 2021-03-15 RX ORDER — HYDROMORPHONE HYDROCHLORIDE 1 MG/ML
0.5 INJECTION, SOLUTION INTRAMUSCULAR; INTRAVENOUS; SUBCUTANEOUS EVERY 4 HOURS PRN
Status: DISCONTINUED | OUTPATIENT
Start: 2021-03-15 | End: 2021-03-15

## 2021-03-15 RX ORDER — IBUPROFEN 200 MG
24 TABLET ORAL
Status: DISCONTINUED | OUTPATIENT
Start: 2021-03-15 | End: 2021-03-17 | Stop reason: HOSPADM

## 2021-03-15 RX ORDER — TALC
6 POWDER (GRAM) TOPICAL NIGHTLY PRN
Status: DISCONTINUED | OUTPATIENT
Start: 2021-03-15 | End: 2021-03-17 | Stop reason: HOSPADM

## 2021-03-15 RX ORDER — AMOXICILLIN 250 MG
2 CAPSULE ORAL DAILY
Status: DISCONTINUED | OUTPATIENT
Start: 2021-03-15 | End: 2021-03-17 | Stop reason: HOSPADM

## 2021-03-15 RX ORDER — SERTRALINE HYDROCHLORIDE 50 MG/1
50 TABLET, FILM COATED ORAL NIGHTLY
Status: DISCONTINUED | OUTPATIENT
Start: 2021-03-15 | End: 2021-03-15

## 2021-03-15 RX ORDER — LEVOTHYROXINE SODIUM 88 UG/1
88 TABLET ORAL DAILY
Status: DISCONTINUED | OUTPATIENT
Start: 2021-03-16 | End: 2021-03-17 | Stop reason: HOSPADM

## 2021-03-15 RX ORDER — GLUCAGON 1 MG
1 KIT INJECTION
Status: DISCONTINUED | OUTPATIENT
Start: 2021-03-15 | End: 2021-03-17 | Stop reason: HOSPADM

## 2021-03-15 RX ORDER — ONDANSETRON 2 MG/ML
4 INJECTION INTRAMUSCULAR; INTRAVENOUS
Status: COMPLETED | OUTPATIENT
Start: 2021-03-15 | End: 2021-03-15

## 2021-03-15 RX ORDER — FERROUS SULFATE 325(65) MG
325 TABLET ORAL DAILY
Status: DISCONTINUED | OUTPATIENT
Start: 2021-03-16 | End: 2021-03-17

## 2021-03-15 RX ORDER — HYDROMORPHONE HYDROCHLORIDE 1 MG/ML
0.5 INJECTION, SOLUTION INTRAMUSCULAR; INTRAVENOUS; SUBCUTANEOUS EVERY 4 HOURS PRN
Status: DISCONTINUED | OUTPATIENT
Start: 2021-03-15 | End: 2021-03-17

## 2021-03-15 RX ORDER — SODIUM BICARBONATE 650 MG/1
650 TABLET ORAL 2 TIMES DAILY
Status: DISCONTINUED | OUTPATIENT
Start: 2021-03-15 | End: 2021-03-15

## 2021-03-15 RX ORDER — IBUPROFEN 200 MG
16 TABLET ORAL
Status: DISCONTINUED | OUTPATIENT
Start: 2021-03-15 | End: 2021-03-17 | Stop reason: HOSPADM

## 2021-03-15 RX ORDER — ONDANSETRON 2 MG/ML
4 INJECTION INTRAMUSCULAR; INTRAVENOUS EVERY 8 HOURS PRN
Status: DISCONTINUED | OUTPATIENT
Start: 2021-03-15 | End: 2021-03-17 | Stop reason: HOSPADM

## 2021-03-15 RX ORDER — TRAMADOL HYDROCHLORIDE 50 MG/1
50 TABLET ORAL EVERY 6 HOURS PRN
Status: DISCONTINUED | OUTPATIENT
Start: 2021-03-15 | End: 2021-03-17 | Stop reason: HOSPADM

## 2021-03-15 RX ORDER — POTASSIUM CHLORIDE 7.45 MG/ML
10 INJECTION INTRAVENOUS ONCE
Status: COMPLETED | OUTPATIENT
Start: 2021-03-15 | End: 2021-03-15

## 2021-03-15 RX ORDER — POLYETHYLENE GLYCOL 3350 17 G/17G
17 POWDER, FOR SOLUTION ORAL DAILY
Status: DISCONTINUED | OUTPATIENT
Start: 2021-03-16 | End: 2021-03-17 | Stop reason: HOSPADM

## 2021-03-15 RX ADMIN — CEFTRIAXONE 1 G: 1 INJECTION, SOLUTION INTRAVENOUS at 04:03

## 2021-03-15 RX ADMIN — SODIUM CHLORIDE 500 ML: 0.9 INJECTION, SOLUTION INTRAVENOUS at 03:03

## 2021-03-15 RX ADMIN — MORPHINE SULFATE 2 MG: 2 INJECTION, SOLUTION INTRAMUSCULAR; INTRAVENOUS at 04:03

## 2021-03-15 RX ADMIN — HYDROMORPHONE HYDROCHLORIDE 0.5 MG: 1 INJECTION, SOLUTION INTRAMUSCULAR; INTRAVENOUS; SUBCUTANEOUS at 10:03

## 2021-03-15 RX ADMIN — HYDROMORPHONE HYDROCHLORIDE 0.5 MG: 1 INJECTION, SOLUTION INTRAMUSCULAR; INTRAVENOUS; SUBCUTANEOUS at 07:03

## 2021-03-15 RX ADMIN — ONDANSETRON 4 MG: 2 INJECTION INTRAMUSCULAR; INTRAVENOUS at 03:03

## 2021-03-15 RX ADMIN — MORPHINE SULFATE 2 MG: 2 INJECTION, SOLUTION INTRAMUSCULAR; INTRAVENOUS at 03:03

## 2021-03-15 RX ADMIN — POTASSIUM CHLORIDE 10 MEQ: 7.46 INJECTION, SOLUTION INTRAVENOUS at 03:03

## 2021-03-16 ENCOUNTER — ANESTHESIA EVENT (OUTPATIENT)
Dept: SURGERY | Facility: HOSPITAL | Age: 71
DRG: 660 | End: 2021-03-16
Payer: OTHER GOVERNMENT

## 2021-03-16 ENCOUNTER — ANESTHESIA (OUTPATIENT)
Dept: SURGERY | Facility: HOSPITAL | Age: 71
DRG: 660 | End: 2021-03-16
Payer: OTHER GOVERNMENT

## 2021-03-16 DIAGNOSIS — N20.0 NEPHROLITHIASIS: Primary | ICD-10-CM

## 2021-03-16 LAB
ANION GAP SERPL CALC-SCNC: 14 MMOL/L (ref 8–16)
BUN SERPL-MCNC: 33 MG/DL (ref 8–23)
CALCIUM SERPL-MCNC: 8.5 MG/DL (ref 8.7–10.5)
CHLORIDE SERPL-SCNC: 98 MMOL/L (ref 95–110)
CO2 SERPL-SCNC: 28 MMOL/L (ref 23–29)
CREAT SERPL-MCNC: 4 MG/DL (ref 0.5–1.4)
ERYTHROCYTE [DISTWIDTH] IN BLOOD BY AUTOMATED COUNT: 13.8 % (ref 11.5–14.5)
EST. GFR  (AFRICAN AMERICAN): 12.3 ML/MIN/1.73 M^2
EST. GFR  (NON AFRICAN AMERICAN): 10.7 ML/MIN/1.73 M^2
GLUCOSE SERPL-MCNC: 76 MG/DL (ref 70–110)
HCT VFR BLD AUTO: 30.9 % (ref 37–48.5)
HGB BLD-MCNC: 9.8 G/DL (ref 12–16)
MAGNESIUM SERPL-MCNC: 1.5 MG/DL (ref 1.6–2.6)
MCH RBC QN AUTO: 33.2 PG (ref 27–31)
MCHC RBC AUTO-ENTMCNC: 31.7 G/DL (ref 32–36)
MCV RBC AUTO: 105 FL (ref 82–98)
PHOSPHATE SERPL-MCNC: 7.2 MG/DL (ref 2.7–4.5)
PLATELET # BLD AUTO: 140 K/UL (ref 150–350)
PMV BLD AUTO: 10 FL (ref 9.2–12.9)
POTASSIUM SERPL-SCNC: 4 MMOL/L (ref 3.5–5.1)
RBC # BLD AUTO: 2.95 M/UL (ref 4–5.4)
SODIUM SERPL-SCNC: 140 MMOL/L (ref 136–145)
WBC # BLD AUTO: 5.95 K/UL (ref 3.9–12.7)

## 2021-03-16 PROCEDURE — 52332 PR CYSTOSCOPY,INSERT URETERAL STENT: ICD-10-PCS | Mod: RT,,, | Performed by: UROLOGY

## 2021-03-16 PROCEDURE — 27201423 OPTIME MED/SURG SUP & DEVICES STERILE SUPPLY: Performed by: UROLOGY

## 2021-03-16 PROCEDURE — 63600175 PHARM REV CODE 636 W HCPCS: Performed by: STUDENT IN AN ORGANIZED HEALTH CARE EDUCATION/TRAINING PROGRAM

## 2021-03-16 PROCEDURE — 71000033 HC RECOVERY, INTIAL HOUR: Performed by: UROLOGY

## 2021-03-16 PROCEDURE — 25500020 PHARM REV CODE 255: Performed by: UROLOGY

## 2021-03-16 PROCEDURE — 27200651 HC AIRWAY, LMA: Performed by: ANESTHESIOLOGY

## 2021-03-16 PROCEDURE — 71000039 HC RECOVERY, EACH ADD'L HOUR: Performed by: UROLOGY

## 2021-03-16 PROCEDURE — 36000706: Performed by: UROLOGY

## 2021-03-16 PROCEDURE — 37000009 HC ANESTHESIA EA ADD 15 MINS: Performed by: UROLOGY

## 2021-03-16 PROCEDURE — 87102 FUNGUS ISOLATION CULTURE: CPT | Performed by: UROLOGY

## 2021-03-16 PROCEDURE — C2617 STENT, NON-COR, TEM W/O DEL: HCPCS | Performed by: UROLOGY

## 2021-03-16 PROCEDURE — C1769 GUIDE WIRE: HCPCS | Performed by: UROLOGY

## 2021-03-16 PROCEDURE — 99223 1ST HOSP IP/OBS HIGH 75: CPT | Mod: ,,, | Performed by: UROLOGY

## 2021-03-16 PROCEDURE — 36415 COLL VENOUS BLD VENIPUNCTURE: CPT | Performed by: INTERNAL MEDICINE

## 2021-03-16 PROCEDURE — 87070 CULTURE OTHR SPECIMN AEROBIC: CPT | Performed by: UROLOGY

## 2021-03-16 PROCEDURE — 25000003 PHARM REV CODE 250: Performed by: NURSE ANESTHETIST, CERTIFIED REGISTERED

## 2021-03-16 PROCEDURE — 83735 ASSAY OF MAGNESIUM: CPT | Performed by: INTERNAL MEDICINE

## 2021-03-16 PROCEDURE — 87206 SMEAR FLUORESCENT/ACID STAI: CPT | Performed by: UROLOGY

## 2021-03-16 PROCEDURE — 99223 PR INITIAL HOSPITAL CARE,LEVL III: ICD-10-PCS | Mod: ,,, | Performed by: UROLOGY

## 2021-03-16 PROCEDURE — 87205 SMEAR GRAM STAIN: CPT | Performed by: UROLOGY

## 2021-03-16 PROCEDURE — 74420 UROGRAPHY RTRGR +-KUB: CPT | Mod: 26,,, | Performed by: UROLOGY

## 2021-03-16 PROCEDURE — 87075 CULTR BACTERIA EXCEPT BLOOD: CPT | Performed by: UROLOGY

## 2021-03-16 PROCEDURE — 25000003 PHARM REV CODE 250: Performed by: INTERNAL MEDICINE

## 2021-03-16 PROCEDURE — 27000671 HC TUBING MICROBORE EXT: Performed by: ANESTHESIOLOGY

## 2021-03-16 PROCEDURE — 87116 MYCOBACTERIA CULTURE: CPT | Performed by: UROLOGY

## 2021-03-16 PROCEDURE — 80048 BASIC METABOLIC PNL TOTAL CA: CPT | Performed by: INTERNAL MEDICINE

## 2021-03-16 PROCEDURE — 27000675 HC TUBING MICRODRIP: Performed by: ANESTHESIOLOGY

## 2021-03-16 PROCEDURE — 63600175 PHARM REV CODE 636 W HCPCS: Performed by: NURSE ANESTHETIST, CERTIFIED REGISTERED

## 2021-03-16 PROCEDURE — 25000003 PHARM REV CODE 250: Performed by: UROLOGY

## 2021-03-16 PROCEDURE — 12000002 HC ACUTE/MED SURGE SEMI-PRIVATE ROOM

## 2021-03-16 PROCEDURE — 25000003 PHARM REV CODE 250: Performed by: STUDENT IN AN ORGANIZED HEALTH CARE EDUCATION/TRAINING PROGRAM

## 2021-03-16 PROCEDURE — 74420 PR  X-RAY RETROGRADE PYELOGRAM: ICD-10-PCS | Mod: 26,,, | Performed by: UROLOGY

## 2021-03-16 PROCEDURE — 63600175 PHARM REV CODE 636 W HCPCS: Performed by: UROLOGY

## 2021-03-16 PROCEDURE — 85027 COMPLETE CBC AUTOMATED: CPT | Performed by: INTERNAL MEDICINE

## 2021-03-16 PROCEDURE — 27202107 HC XP QUATRO SENSOR: Performed by: ANESTHESIOLOGY

## 2021-03-16 PROCEDURE — 87077 CULTURE AEROBIC IDENTIFY: CPT | Performed by: UROLOGY

## 2021-03-16 PROCEDURE — 87118 MYCOBACTERIC IDENTIFICATION: CPT | Performed by: UROLOGY

## 2021-03-16 PROCEDURE — 87186 SC STD MICRODIL/AGAR DIL: CPT | Performed by: UROLOGY

## 2021-03-16 PROCEDURE — 52332 CYSTOSCOPY AND TREATMENT: CPT | Mod: RT,,, | Performed by: UROLOGY

## 2021-03-16 PROCEDURE — 36000707: Performed by: UROLOGY

## 2021-03-16 PROCEDURE — 63600175 PHARM REV CODE 636 W HCPCS: Performed by: INTERNAL MEDICINE

## 2021-03-16 PROCEDURE — 84100 ASSAY OF PHOSPHORUS: CPT | Performed by: INTERNAL MEDICINE

## 2021-03-16 PROCEDURE — 37000008 HC ANESTHESIA 1ST 15 MINUTES: Performed by: UROLOGY

## 2021-03-16 DEVICE — STENT URETERAL FIRM 4.8FX26 ASCERTA
Type: IMPLANTABLE DEVICE | Site: URETER | Status: NON-FUNCTIONAL
Removed: 2021-03-30

## 2021-03-16 RX ORDER — SODIUM CHLORIDE 0.9 % (FLUSH) 0.9 %
10 SYRINGE (ML) INJECTION
Status: DISCONTINUED | OUTPATIENT
Start: 2021-03-16 | End: 2021-03-17 | Stop reason: HOSPADM

## 2021-03-16 RX ORDER — ACETAMINOPHEN 10 MG/ML
INJECTION, SOLUTION INTRAVENOUS
Status: DISCONTINUED | OUTPATIENT
Start: 2021-03-16 | End: 2021-03-16

## 2021-03-16 RX ORDER — PROPOFOL 10 MG/ML
VIAL (ML) INTRAVENOUS
Status: DISCONTINUED | OUTPATIENT
Start: 2021-03-16 | End: 2021-03-16

## 2021-03-16 RX ORDER — DIPHENHYDRAMINE HYDROCHLORIDE 50 MG/ML
12.5 INJECTION INTRAMUSCULAR; INTRAVENOUS
Status: DISCONTINUED | OUTPATIENT
Start: 2021-03-16 | End: 2021-03-16 | Stop reason: HOSPADM

## 2021-03-16 RX ORDER — PHENYLEPHRINE HYDROCHLORIDE 10 MG/ML
INJECTION INTRAVENOUS
Status: DISCONTINUED | OUTPATIENT
Start: 2021-03-16 | End: 2021-03-16

## 2021-03-16 RX ORDER — FAMOTIDINE 10 MG/ML
INJECTION INTRAVENOUS
Status: DISCONTINUED | OUTPATIENT
Start: 2021-03-16 | End: 2021-03-16

## 2021-03-16 RX ORDER — ONDANSETRON 2 MG/ML
INJECTION INTRAMUSCULAR; INTRAVENOUS
Status: DISCONTINUED | OUTPATIENT
Start: 2021-03-16 | End: 2021-03-16

## 2021-03-16 RX ORDER — MIDAZOLAM HYDROCHLORIDE 1 MG/ML
INJECTION INTRAMUSCULAR; INTRAVENOUS
Status: DISCONTINUED | OUTPATIENT
Start: 2021-03-16 | End: 2021-03-16

## 2021-03-16 RX ORDER — FENTANYL CITRATE 50 UG/ML
INJECTION, SOLUTION INTRAMUSCULAR; INTRAVENOUS
Status: DISCONTINUED | OUTPATIENT
Start: 2021-03-16 | End: 2021-03-16

## 2021-03-16 RX ORDER — SERTRALINE HYDROCHLORIDE 50 MG/1
50 TABLET, FILM COATED ORAL NIGHTLY
Status: DISCONTINUED | OUTPATIENT
Start: 2021-03-16 | End: 2021-03-17 | Stop reason: HOSPADM

## 2021-03-16 RX ORDER — LIDOCAINE HYDROCHLORIDE 20 MG/ML
INJECTION, SOLUTION EPIDURAL; INFILTRATION; INTRACAUDAL; PERINEURAL
Status: DISCONTINUED | OUTPATIENT
Start: 2021-03-16 | End: 2021-03-16

## 2021-03-16 RX ORDER — OXYCODONE HYDROCHLORIDE 5 MG/1
5 TABLET ORAL
Status: DISCONTINUED | OUTPATIENT
Start: 2021-03-16 | End: 2021-03-16 | Stop reason: HOSPADM

## 2021-03-16 RX ORDER — BISACODYL 10 MG
10 SUPPOSITORY, RECTAL RECTAL DAILY PRN
Status: DISCONTINUED | OUTPATIENT
Start: 2021-03-16 | End: 2021-03-17 | Stop reason: HOSPADM

## 2021-03-16 RX ORDER — SODIUM BICARBONATE 650 MG/1
650 TABLET ORAL 2 TIMES DAILY
Status: DISCONTINUED | OUTPATIENT
Start: 2021-03-16 | End: 2021-03-17

## 2021-03-16 RX ORDER — DIPHENHYDRAMINE HCL 25 MG
25 CAPSULE ORAL EVERY 6 HOURS PRN
Status: DISCONTINUED | OUTPATIENT
Start: 2021-03-16 | End: 2021-03-17 | Stop reason: HOSPADM

## 2021-03-16 RX ORDER — HYDROMORPHONE HYDROCHLORIDE 1 MG/ML
0.2 INJECTION, SOLUTION INTRAMUSCULAR; INTRAVENOUS; SUBCUTANEOUS
Status: DISCONTINUED | OUTPATIENT
Start: 2021-03-16 | End: 2021-03-16 | Stop reason: HOSPADM

## 2021-03-16 RX ORDER — ONDANSETRON 2 MG/ML
4 INJECTION INTRAMUSCULAR; INTRAVENOUS DAILY PRN
Status: DISCONTINUED | OUTPATIENT
Start: 2021-03-16 | End: 2021-03-16 | Stop reason: HOSPADM

## 2021-03-16 RX ORDER — SODIUM CHLORIDE 0.9 G/100ML
IRRIGANT IRRIGATION
Status: DISCONTINUED | OUTPATIENT
Start: 2021-03-16 | End: 2021-03-16 | Stop reason: HOSPADM

## 2021-03-16 RX ORDER — LIDOCAINE HYDROCHLORIDE 20 MG/ML
JELLY TOPICAL
Status: DISCONTINUED | OUTPATIENT
Start: 2021-03-16 | End: 2021-03-16

## 2021-03-16 RX ORDER — DEXAMETHASONE SODIUM PHOSPHATE 4 MG/ML
INJECTION, SOLUTION INTRA-ARTICULAR; INTRALESIONAL; INTRAMUSCULAR; INTRAVENOUS; SOFT TISSUE
Status: DISCONTINUED | OUTPATIENT
Start: 2021-03-16 | End: 2021-03-16

## 2021-03-16 RX ADMIN — OXYCODONE HYDROCHLORIDE 5 MG: 5 TABLET ORAL at 02:03

## 2021-03-16 RX ADMIN — HYDROMORPHONE HYDROCHLORIDE 0.2 MG: 1 INJECTION, SOLUTION INTRAMUSCULAR; INTRAVENOUS; SUBCUTANEOUS at 02:03

## 2021-03-16 RX ADMIN — LEVOTHYROXINE SODIUM 88 MCG: 88 TABLET ORAL at 05:03

## 2021-03-16 RX ADMIN — HYDROMORPHONE HYDROCHLORIDE 0.5 MG: 1 INJECTION, SOLUTION INTRAMUSCULAR; INTRAVENOUS; SUBCUTANEOUS at 05:03

## 2021-03-16 RX ADMIN — DIPHENHYDRAMINE HYDROCHLORIDE 25 MG: 25 CAPSULE ORAL at 08:03

## 2021-03-16 RX ADMIN — PROPOFOL 100 MG: 10 INJECTION, EMULSION INTRAVENOUS at 01:03

## 2021-03-16 RX ADMIN — SERTRALINE HYDROCHLORIDE 50 MG: 50 TABLET ORAL at 08:03

## 2021-03-16 RX ADMIN — FENTANYL CITRATE 50 MCG: 50 INJECTION INTRAMUSCULAR; INTRAVENOUS at 01:03

## 2021-03-16 RX ADMIN — DEXAMETHASONE SODIUM PHOSPHATE 8 MG: 4 INJECTION, SOLUTION INTRAMUSCULAR; INTRAVENOUS at 01:03

## 2021-03-16 RX ADMIN — DILTIAZEM HYDROCHLORIDE 120 MG: 120 CAPSULE, EXTENDED RELEASE ORAL at 10:03

## 2021-03-16 RX ADMIN — PHENYLEPHRINE HYDROCHLORIDE 200 MCG: 10 INJECTION INTRAVENOUS at 01:03

## 2021-03-16 RX ADMIN — LIDOCAINE HYDROCHLORIDE 60 MG: 20 INJECTION, SOLUTION EPIDURAL; INFILTRATION; INTRACAUDAL; PERINEURAL at 01:03

## 2021-03-16 RX ADMIN — SODIUM CHLORIDE: 9 INJECTION, SOLUTION INTRAVENOUS at 01:03

## 2021-03-16 RX ADMIN — ACETAMINOPHEN 650 MG: 325 TABLET, FILM COATED ORAL at 02:03

## 2021-03-16 RX ADMIN — CEFTRIAXONE SODIUM 1 G: 1 INJECTION, POWDER, FOR SOLUTION INTRAMUSCULAR; INTRAVENOUS at 05:03

## 2021-03-16 RX ADMIN — SODIUM BICARBONATE 650 MG TABLET 650 MG: at 08:03

## 2021-03-16 RX ADMIN — ACETAMINOPHEN 1000 MG: 10 INJECTION, SOLUTION INTRAVENOUS at 01:03

## 2021-03-16 RX ADMIN — POTASSIUM BICARBONATE 20 MEQ: 391 TABLET, EFFERVESCENT ORAL at 12:03

## 2021-03-16 RX ADMIN — MIDAZOLAM HYDROCHLORIDE 1 MG: 1 INJECTION, SOLUTION INTRAMUSCULAR; INTRAVENOUS at 01:03

## 2021-03-16 RX ADMIN — PROPOFOL 40 MG: 10 INJECTION, EMULSION INTRAVENOUS at 01:03

## 2021-03-16 RX ADMIN — DIPHENHYDRAMINE HYDROCHLORIDE 12.5 MG: 50 INJECTION INTRAMUSCULAR; INTRAVENOUS at 02:03

## 2021-03-16 RX ADMIN — ONDANSETRON 4 MG: 2 INJECTION INTRAMUSCULAR; INTRAVENOUS at 01:03

## 2021-03-16 RX ADMIN — HYDROMORPHONE HYDROCHLORIDE 0.5 MG: 1 INJECTION, SOLUTION INTRAMUSCULAR; INTRAVENOUS; SUBCUTANEOUS at 10:03

## 2021-03-16 RX ADMIN — SODIUM BICARBONATE 650 MG TABLET 650 MG: at 12:03

## 2021-03-16 RX ADMIN — SERTRALINE HYDROCHLORIDE 50 MG: 50 TABLET ORAL at 12:03

## 2021-03-16 RX ADMIN — FAMOTIDINE 20 MG: 10 INJECTION, SOLUTION INTRAVENOUS at 01:03

## 2021-03-16 RX ADMIN — HYDROMORPHONE HYDROCHLORIDE 0.5 MG: 1 INJECTION, SOLUTION INTRAMUSCULAR; INTRAVENOUS; SUBCUTANEOUS at 08:03

## 2021-03-16 RX ADMIN — LIDOCAINE HYDROCHLORIDE 4 ML: 20 JELLY TOPICAL at 01:03

## 2021-03-17 VITALS
RESPIRATION RATE: 19 BRPM | WEIGHT: 136.69 LBS | DIASTOLIC BLOOD PRESSURE: 71 MMHG | HEART RATE: 80 BPM | TEMPERATURE: 98 F | SYSTOLIC BLOOD PRESSURE: 114 MMHG | HEIGHT: 68 IN | OXYGEN SATURATION: 94 % | BODY MASS INDEX: 20.72 KG/M2

## 2021-03-17 PROBLEM — E87.6 HYPOKALEMIA: Status: RESOLVED | Noted: 2021-03-15 | Resolved: 2021-03-17

## 2021-03-17 PROBLEM — N23 RENAL COLIC ON RIGHT SIDE: Status: RESOLVED | Noted: 2021-03-15 | Resolved: 2021-03-17

## 2021-03-17 LAB
ANION GAP SERPL CALC-SCNC: 17 MMOL/L (ref 8–16)
BACTERIA UR CULT: ABNORMAL
BUN SERPL-MCNC: 53 MG/DL (ref 8–23)
CALCIUM SERPL-MCNC: 7.8 MG/DL (ref 8.7–10.5)
CHLORIDE SERPL-SCNC: 97 MMOL/L (ref 95–110)
CO2 SERPL-SCNC: 24 MMOL/L (ref 23–29)
CREAT SERPL-MCNC: 5.2 MG/DL (ref 0.5–1.4)
ERYTHROCYTE [DISTWIDTH] IN BLOOD BY AUTOMATED COUNT: 13.5 % (ref 11.5–14.5)
EST. GFR  (AFRICAN AMERICAN): 9 ML/MIN/1.73 M^2
EST. GFR  (NON AFRICAN AMERICAN): 7.8 ML/MIN/1.73 M^2
GLUCOSE SERPL-MCNC: 262 MG/DL (ref 70–110)
GRAM STN SPEC: NORMAL
GRAM STN SPEC: NORMAL
HCT VFR BLD AUTO: 32.9 % (ref 37–48.5)
HGB BLD-MCNC: 10.4 G/DL (ref 12–16)
MAGNESIUM SERPL-MCNC: 1.6 MG/DL (ref 1.6–2.6)
MCH RBC QN AUTO: 32.5 PG (ref 27–31)
MCHC RBC AUTO-ENTMCNC: 31.6 G/DL (ref 32–36)
MCV RBC AUTO: 103 FL (ref 82–98)
PHOSPHATE SERPL-MCNC: 10.6 MG/DL (ref 2.7–4.5)
PLATELET # BLD AUTO: 180 K/UL (ref 150–350)
PMV BLD AUTO: 10.6 FL (ref 9.2–12.9)
POTASSIUM SERPL-SCNC: 3.9 MMOL/L (ref 3.5–5.1)
RBC # BLD AUTO: 3.2 M/UL (ref 4–5.4)
SODIUM SERPL-SCNC: 138 MMOL/L (ref 136–145)
WBC # BLD AUTO: 4.92 K/UL (ref 3.9–12.7)

## 2021-03-17 PROCEDURE — 25000003 PHARM REV CODE 250: Performed by: INTERNAL MEDICINE

## 2021-03-17 PROCEDURE — 25000003 PHARM REV CODE 250: Performed by: UROLOGY

## 2021-03-17 PROCEDURE — 63600175 PHARM REV CODE 636 W HCPCS: Performed by: UROLOGY

## 2021-03-17 PROCEDURE — 36415 COLL VENOUS BLD VENIPUNCTURE: CPT | Performed by: INTERNAL MEDICINE

## 2021-03-17 PROCEDURE — 90935 HEMODIALYSIS ONE EVALUATION: CPT

## 2021-03-17 PROCEDURE — 85027 COMPLETE CBC AUTOMATED: CPT | Performed by: INTERNAL MEDICINE

## 2021-03-17 PROCEDURE — 63600175 PHARM REV CODE 636 W HCPCS: Mod: TB | Performed by: INTERNAL MEDICINE

## 2021-03-17 PROCEDURE — 84100 ASSAY OF PHOSPHORUS: CPT | Performed by: INTERNAL MEDICINE

## 2021-03-17 PROCEDURE — 80048 BASIC METABOLIC PNL TOTAL CA: CPT | Performed by: INTERNAL MEDICINE

## 2021-03-17 PROCEDURE — 83735 ASSAY OF MAGNESIUM: CPT | Performed by: INTERNAL MEDICINE

## 2021-03-17 RX ORDER — LANOLIN ALCOHOL/MO/W.PET/CERES
400 CREAM (GRAM) TOPICAL ONCE
Status: DISCONTINUED | OUTPATIENT
Start: 2021-03-17 | End: 2021-03-17 | Stop reason: HOSPADM

## 2021-03-17 RX ORDER — HYDROCODONE BITARTRATE AND ACETAMINOPHEN 5; 325 MG/1; MG/1
1 TABLET ORAL EVERY 8 HOURS PRN
Qty: 12 TABLET | Refills: 0 | Status: ON HOLD | OUTPATIENT
Start: 2021-03-17 | End: 2021-03-30 | Stop reason: SDUPTHER

## 2021-03-17 RX ORDER — CEPHALEXIN 500 MG/1
500 CAPSULE ORAL DAILY
Qty: 10 CAPSULE | Refills: 0 | Status: SHIPPED | OUTPATIENT
Start: 2021-03-17 | End: 2021-03-27

## 2021-03-17 RX ORDER — CEPHALEXIN 500 MG/1
500 CAPSULE ORAL ONCE
Qty: 1 CAPSULE | Refills: 0 | Status: SHIPPED | OUTPATIENT
Start: 2021-03-17 | End: 2021-03-17

## 2021-03-17 RX ORDER — SEVELAMER CARBONATE 800 MG/1
800 TABLET, FILM COATED ORAL
Qty: 90 TABLET | Refills: 11 | Status: SHIPPED | OUTPATIENT
Start: 2021-03-17 | End: 2021-05-17 | Stop reason: CLARIF

## 2021-03-17 RX ORDER — TAMSULOSIN HYDROCHLORIDE 0.4 MG/1
0.4 CAPSULE ORAL DAILY
Qty: 30 CAPSULE | Refills: 0 | Status: ON HOLD | OUTPATIENT
Start: 2021-03-18 | End: 2021-04-20 | Stop reason: SDUPTHER

## 2021-03-17 RX ORDER — POLYETHYLENE GLYCOL 3350 17 G/17G
17 POWDER, FOR SOLUTION ORAL DAILY
Refills: 0 | COMMUNITY
Start: 2021-03-18 | End: 2021-05-17 | Stop reason: CLARIF

## 2021-03-17 RX ORDER — MAGNESIUM SULFATE HEPTAHYDRATE 40 MG/ML
2 INJECTION, SOLUTION INTRAVENOUS ONCE
Status: DISCONTINUED | OUTPATIENT
Start: 2021-03-17 | End: 2021-03-17 | Stop reason: HOSPADM

## 2021-03-17 RX ORDER — HYDROCODONE BITARTRATE AND ACETAMINOPHEN 5; 325 MG/1; MG/1
1 TABLET ORAL EVERY 8 HOURS PRN
Status: DISCONTINUED | OUTPATIENT
Start: 2021-03-17 | End: 2021-03-17 | Stop reason: HOSPADM

## 2021-03-17 RX ORDER — CEPHALEXIN 250 MG/1
500 CAPSULE ORAL ONCE
Status: COMPLETED | OUTPATIENT
Start: 2021-03-17 | End: 2021-03-17

## 2021-03-17 RX ORDER — SEVELAMER CARBONATE 800 MG/1
800 TABLET, FILM COATED ORAL
Status: DISCONTINUED | OUTPATIENT
Start: 2021-03-17 | End: 2021-03-17 | Stop reason: HOSPADM

## 2021-03-17 RX ORDER — CEPHALEXIN 250 MG/5ML
500 POWDER, FOR SUSPENSION ORAL ONCE
Status: DISCONTINUED | OUTPATIENT
Start: 2021-03-17 | End: 2021-03-17

## 2021-03-17 RX ADMIN — EPOETIN ALFA-EPBX 6200 UNITS: 10000 INJECTION, SOLUTION INTRAVENOUS; SUBCUTANEOUS at 02:03

## 2021-03-17 RX ADMIN — HYDROMORPHONE HYDROCHLORIDE 0.5 MG: 1 INJECTION, SOLUTION INTRAMUSCULAR; INTRAVENOUS; SUBCUTANEOUS at 07:03

## 2021-03-17 RX ADMIN — CEPHALEXIN 500 MG: 250 CAPSULE ORAL at 04:03

## 2021-03-17 RX ADMIN — TAMSULOSIN HYDROCHLORIDE 0.4 MG: 0.4 CAPSULE ORAL at 09:03

## 2021-03-17 RX ADMIN — SODIUM BICARBONATE 650 MG TABLET 650 MG: at 09:03

## 2021-03-17 RX ADMIN — HYDROCODONE BITARTRATE AND ACETAMINOPHEN 1 TABLET: 5; 325 TABLET ORAL at 11:03

## 2021-03-17 RX ADMIN — LEVOTHYROXINE SODIUM 88 MCG: 88 TABLET ORAL at 05:03

## 2021-03-17 RX ADMIN — HYDROMORPHONE HYDROCHLORIDE 0.5 MG: 1 INJECTION, SOLUTION INTRAMUSCULAR; INTRAVENOUS; SUBCUTANEOUS at 02:03

## 2021-03-17 RX ADMIN — FERROUS SULFATE TAB 325 MG (65 MG ELEMENTAL FE) 325 MG: 325 (65 FE) TAB at 09:03

## 2021-03-18 LAB — BACTERIA SPEC AEROBE CULT: ABNORMAL

## 2021-03-19 LAB — BACTERIA SPEC ANAEROBE CULT: NORMAL

## 2021-03-20 LAB — BACTERIA BLD CULT: NORMAL

## 2021-03-27 ENCOUNTER — LAB VISIT (OUTPATIENT)
Dept: PRIMARY CARE CLINIC | Facility: CLINIC | Age: 71
End: 2021-03-27
Payer: MEDICARE

## 2021-03-27 DIAGNOSIS — N20.0 NEPHROLITHIASIS: ICD-10-CM

## 2021-03-27 PROCEDURE — U0005 INFEC AGEN DETEC AMPLI PROBE: HCPCS | Performed by: UROLOGY

## 2021-03-27 PROCEDURE — U0003 INFECTIOUS AGENT DETECTION BY NUCLEIC ACID (DNA OR RNA); SEVERE ACUTE RESPIRATORY SYNDROME CORONAVIRUS 2 (SARS-COV-2) (CORONAVIRUS DISEASE [COVID-19]), AMPLIFIED PROBE TECHNIQUE, MAKING USE OF HIGH THROUGHPUT TECHNOLOGIES AS DESCRIBED BY CMS-2020-01-R: HCPCS | Performed by: UROLOGY

## 2021-03-28 LAB — SARS-COV-2 RNA RESP QL NAA+PROBE: NOT DETECTED

## 2021-03-29 ENCOUNTER — ANESTHESIA EVENT (OUTPATIENT)
Dept: SURGERY | Facility: HOSPITAL | Age: 71
End: 2021-03-29
Payer: MEDICARE

## 2021-03-29 RX ORDER — SODIUM CHLORIDE, SODIUM LACTATE, POTASSIUM CHLORIDE, CALCIUM CHLORIDE 600; 310; 30; 20 MG/100ML; MG/100ML; MG/100ML; MG/100ML
INJECTION, SOLUTION INTRAVENOUS CONTINUOUS
Status: CANCELLED | OUTPATIENT
Start: 2021-03-29

## 2021-03-30 ENCOUNTER — ANESTHESIA (OUTPATIENT)
Dept: SURGERY | Facility: HOSPITAL | Age: 71
End: 2021-03-30
Payer: MEDICARE

## 2021-03-30 ENCOUNTER — HOSPITAL ENCOUNTER (OUTPATIENT)
Facility: HOSPITAL | Age: 71
Discharge: HOME | End: 2021-03-30
Attending: UROLOGY | Admitting: UROLOGY
Payer: MEDICARE

## 2021-03-30 VITALS
OXYGEN SATURATION: 95 % | HEIGHT: 68 IN | BODY MASS INDEX: 20 KG/M2 | HEART RATE: 80 BPM | TEMPERATURE: 98 F | RESPIRATION RATE: 12 BRPM | WEIGHT: 132 LBS | SYSTOLIC BLOOD PRESSURE: 118 MMHG | DIASTOLIC BLOOD PRESSURE: 61 MMHG

## 2021-03-30 DIAGNOSIS — N20.0 NEPHROLITHIASIS: Primary | ICD-10-CM

## 2021-03-30 DIAGNOSIS — Z01.818 PRE-OP TESTING: ICD-10-CM

## 2021-03-30 PROCEDURE — 71000015 HC POSTOP RECOV 1ST HR: Performed by: UROLOGY

## 2021-03-30 PROCEDURE — 25500020 PHARM REV CODE 255: Performed by: UROLOGY

## 2021-03-30 PROCEDURE — 52356 PR CYSTO/URETERO W/LITHOTRIPSY: ICD-10-PCS | Mod: 22,RT,, | Performed by: UROLOGY

## 2021-03-30 PROCEDURE — 63600175 PHARM REV CODE 636 W HCPCS: Performed by: NURSE ANESTHETIST, CERTIFIED REGISTERED

## 2021-03-30 PROCEDURE — C1769 GUIDE WIRE: HCPCS | Performed by: UROLOGY

## 2021-03-30 PROCEDURE — 74420 UROGRAPHY RTRGR +-KUB: CPT | Mod: 26,,, | Performed by: UROLOGY

## 2021-03-30 PROCEDURE — 52356 CYSTO/URETERO W/LITHOTRIPSY: CPT | Mod: 22,RT,, | Performed by: UROLOGY

## 2021-03-30 PROCEDURE — 82365 CALCULUS SPECTROSCOPY: CPT | Performed by: UROLOGY

## 2021-03-30 PROCEDURE — 99900103 DSU ONLY-NO CHARGE-INITIAL HR (STAT): Performed by: UROLOGY

## 2021-03-30 PROCEDURE — 99900104 DSU ONLY-NO CHARGE-EA ADD'L HR (STAT): Performed by: UROLOGY

## 2021-03-30 PROCEDURE — C1758 CATHETER, URETERAL: HCPCS | Performed by: UROLOGY

## 2021-03-30 PROCEDURE — 25000003 PHARM REV CODE 250: Performed by: NURSE ANESTHETIST, CERTIFIED REGISTERED

## 2021-03-30 PROCEDURE — D9220A PRA ANESTHESIA: Mod: ANES,,, | Performed by: ANESTHESIOLOGY

## 2021-03-30 PROCEDURE — 27201423 OPTIME MED/SURG SUP & DEVICES STERILE SUPPLY: Performed by: UROLOGY

## 2021-03-30 PROCEDURE — 36000706: Performed by: UROLOGY

## 2021-03-30 PROCEDURE — 27200651 HC AIRWAY, LMA: Performed by: ANESTHESIOLOGY

## 2021-03-30 PROCEDURE — 74420 PR  X-RAY RETROGRADE PYELOGRAM: ICD-10-PCS | Mod: 26,,, | Performed by: UROLOGY

## 2021-03-30 PROCEDURE — D9220A PRA ANESTHESIA: Mod: CRNA,,, | Performed by: NURSE ANESTHETIST, CERTIFIED REGISTERED

## 2021-03-30 PROCEDURE — 37000009 HC ANESTHESIA EA ADD 15 MINS: Performed by: UROLOGY

## 2021-03-30 PROCEDURE — D9220A PRA ANESTHESIA: ICD-10-PCS | Mod: ANES,,, | Performed by: ANESTHESIOLOGY

## 2021-03-30 PROCEDURE — D9220A PRA ANESTHESIA: ICD-10-PCS | Mod: CRNA,,, | Performed by: NURSE ANESTHETIST, CERTIFIED REGISTERED

## 2021-03-30 PROCEDURE — 71000033 HC RECOVERY, INTIAL HOUR: Performed by: UROLOGY

## 2021-03-30 PROCEDURE — 37000008 HC ANESTHESIA 1ST 15 MINUTES: Performed by: UROLOGY

## 2021-03-30 PROCEDURE — 63600175 PHARM REV CODE 636 W HCPCS: Performed by: UROLOGY

## 2021-03-30 PROCEDURE — C1894 INTRO/SHEATH, NON-LASER: HCPCS | Performed by: UROLOGY

## 2021-03-30 PROCEDURE — 25000003 PHARM REV CODE 250: Performed by: ANESTHESIOLOGY

## 2021-03-30 PROCEDURE — 36000707: Performed by: UROLOGY

## 2021-03-30 PROCEDURE — 71000039 HC RECOVERY, EACH ADD'L HOUR: Performed by: UROLOGY

## 2021-03-30 PROCEDURE — C2617 STENT, NON-COR, TEM W/O DEL: HCPCS | Performed by: UROLOGY

## 2021-03-30 PROCEDURE — 71000016 HC POSTOP RECOV ADDL HR: Performed by: UROLOGY

## 2021-03-30 DEVICE — STENT DBL PGTL URET 6FR 26CM
Type: IMPLANTABLE DEVICE | Site: URETER | Status: NON-FUNCTIONAL
Removed: 2021-04-20

## 2021-03-30 RX ORDER — LIDOCAINE HYDROCHLORIDE 10 MG/ML
0.5 INJECTION, SOLUTION EPIDURAL; INFILTRATION; INTRACAUDAL; PERINEURAL ONCE
Status: DISCONTINUED | OUTPATIENT
Start: 2021-03-30 | End: 2022-01-01

## 2021-03-30 RX ORDER — OXYCODONE HYDROCHLORIDE 5 MG/1
5 TABLET ORAL ONCE AS NEEDED
Status: COMPLETED | OUTPATIENT
Start: 2021-03-30 | End: 2021-03-30

## 2021-03-30 RX ORDER — FENTANYL CITRATE 50 UG/ML
INJECTION, SOLUTION INTRAMUSCULAR; INTRAVENOUS
Status: DISCONTINUED | OUTPATIENT
Start: 2021-03-30 | End: 2021-03-30

## 2021-03-30 RX ORDER — EPHEDRINE SULFATE 50 MG/ML
INJECTION, SOLUTION INTRAVENOUS
Status: DISCONTINUED | OUTPATIENT
Start: 2021-03-30 | End: 2021-03-30

## 2021-03-30 RX ORDER — ONDANSETRON 2 MG/ML
4 INJECTION INTRAMUSCULAR; INTRAVENOUS ONCE AS NEEDED
Status: DISCONTINUED | OUTPATIENT
Start: 2021-03-30 | End: 2021-03-30 | Stop reason: HOSPADM

## 2021-03-30 RX ORDER — LIDOCAINE HCL/PF 100 MG/5ML
SYRINGE (ML) INTRAVENOUS
Status: DISCONTINUED | OUTPATIENT
Start: 2021-03-30 | End: 2021-03-30

## 2021-03-30 RX ORDER — PROPOFOL 10 MG/ML
VIAL (ML) INTRAVENOUS
Status: DISCONTINUED | OUTPATIENT
Start: 2021-03-30 | End: 2021-03-30

## 2021-03-30 RX ORDER — ONDANSETRON 2 MG/ML
INJECTION INTRAMUSCULAR; INTRAVENOUS
Status: DISCONTINUED | OUTPATIENT
Start: 2021-03-30 | End: 2021-03-30

## 2021-03-30 RX ORDER — MIDAZOLAM HYDROCHLORIDE 1 MG/ML
INJECTION INTRAMUSCULAR; INTRAVENOUS
Status: DISCONTINUED | OUTPATIENT
Start: 2021-03-30 | End: 2021-03-30

## 2021-03-30 RX ORDER — DOXYCYCLINE 100 MG/1
100 CAPSULE ORAL EVERY 12 HOURS
Qty: 6 CAPSULE | Refills: 0 | Status: SHIPPED | OUTPATIENT
Start: 2021-03-30 | End: 2021-04-02

## 2021-03-30 RX ORDER — PHENYLEPHRINE HYDROCHLORIDE 10 MG/ML
INJECTION INTRAVENOUS
Status: DISCONTINUED | OUTPATIENT
Start: 2021-03-30 | End: 2021-03-30

## 2021-03-30 RX ORDER — DEXAMETHASONE SODIUM PHOSPHATE 4 MG/ML
INJECTION, SOLUTION INTRA-ARTICULAR; INTRALESIONAL; INTRAMUSCULAR; INTRAVENOUS; SOFT TISSUE
Status: DISCONTINUED | OUTPATIENT
Start: 2021-03-30 | End: 2021-03-30

## 2021-03-30 RX ORDER — FENTANYL CITRATE 50 UG/ML
25 INJECTION, SOLUTION INTRAMUSCULAR; INTRAVENOUS EVERY 5 MIN PRN
Status: DISCONTINUED | OUTPATIENT
Start: 2021-03-30 | End: 2021-03-30 | Stop reason: HOSPADM

## 2021-03-30 RX ORDER — HYDROCODONE BITARTRATE AND ACETAMINOPHEN 5; 325 MG/1; MG/1
1 TABLET ORAL EVERY 8 HOURS PRN
Qty: 7 TABLET | Refills: 0 | Status: SHIPPED | OUTPATIENT
Start: 2021-03-30 | End: 2021-04-02

## 2021-03-30 RX ORDER — SODIUM CHLORIDE 9 MG/ML
INJECTION, SOLUTION INTRAVENOUS CONTINUOUS
Status: DISCONTINUED | OUTPATIENT
Start: 2021-03-30 | End: 2021-03-30 | Stop reason: HOSPADM

## 2021-03-30 RX ADMIN — ONDANSETRON 4 MG: 2 INJECTION, SOLUTION INTRAMUSCULAR; INTRAVENOUS at 11:03

## 2021-03-30 RX ADMIN — FENTANYL CITRATE 50 MCG: 50 INJECTION, SOLUTION INTRAMUSCULAR; INTRAVENOUS at 11:03

## 2021-03-30 RX ADMIN — PHENYLEPHRINE HYDROCHLORIDE 200 MCG: 10 INJECTION INTRAVENOUS at 11:03

## 2021-03-30 RX ADMIN — CEFTRIAXONE 1 G: 1 INJECTION, SOLUTION INTRAVENOUS at 11:03

## 2021-03-30 RX ADMIN — EPHEDRINE SULFATE 10 MG: 50 INJECTION, SOLUTION INTRAMUSCULAR; INTRAVENOUS; SUBCUTANEOUS at 11:03

## 2021-03-30 RX ADMIN — MIDAZOLAM HYDROCHLORIDE 2 MG: 1 INJECTION, SOLUTION INTRAMUSCULAR; INTRAVENOUS at 10:03

## 2021-03-30 RX ADMIN — DEXAMETHASONE SODIUM PHOSPHATE 4 MG: 4 INJECTION, SOLUTION INTRA-ARTICULAR; INTRALESIONAL; INTRAMUSCULAR; INTRAVENOUS; SOFT TISSUE at 11:03

## 2021-03-30 RX ADMIN — OXYCODONE HYDROCHLORIDE 5 MG: 5 TABLET ORAL at 01:03

## 2021-03-30 RX ADMIN — PROPOFOL 120 MG: 10 INJECTION, EMULSION INTRAVENOUS at 11:03

## 2021-03-30 RX ADMIN — EPHEDRINE SULFATE 15 MG: 50 INJECTION, SOLUTION INTRAMUSCULAR; INTRAVENOUS; SUBCUTANEOUS at 11:03

## 2021-03-30 RX ADMIN — SODIUM CHLORIDE: 0.9 INJECTION, SOLUTION INTRAVENOUS at 10:03

## 2021-03-30 RX ADMIN — LIDOCAINE HYDROCHLORIDE 100 MG: 20 INJECTION, SOLUTION INTRAVENOUS at 11:03

## 2021-03-30 RX ADMIN — FENTANYL CITRATE 50 MCG: 50 INJECTION, SOLUTION INTRAMUSCULAR; INTRAVENOUS at 12:03

## 2021-04-02 LAB
COMPN STONE: NORMAL
SPECIMEN SOURCE: NORMAL
STONE ANALYSIS IR-IMP: NORMAL

## 2021-04-13 ENCOUNTER — LAB VISIT (OUTPATIENT)
Dept: LAB | Facility: HOSPITAL | Age: 71
End: 2021-04-13
Attending: UROLOGY
Payer: MEDICARE

## 2021-04-13 DIAGNOSIS — N20.0 NEPHROLITHIASIS: ICD-10-CM

## 2021-04-13 PROCEDURE — 87086 URINE CULTURE/COLONY COUNT: CPT | Performed by: UROLOGY

## 2021-04-15 LAB
BACTERIA UR CULT: NORMAL
BACTERIA UR CULT: NORMAL

## 2021-04-17 ENCOUNTER — LAB VISIT (OUTPATIENT)
Dept: PRIMARY CARE CLINIC | Facility: CLINIC | Age: 71
End: 2021-04-17
Payer: MEDICARE

## 2021-04-17 DIAGNOSIS — Z01.818 PRE-OP TESTING: ICD-10-CM

## 2021-04-17 PROCEDURE — U0003 INFECTIOUS AGENT DETECTION BY NUCLEIC ACID (DNA OR RNA); SEVERE ACUTE RESPIRATORY SYNDROME CORONAVIRUS 2 (SARS-COV-2) (CORONAVIRUS DISEASE [COVID-19]), AMPLIFIED PROBE TECHNIQUE, MAKING USE OF HIGH THROUGHPUT TECHNOLOGIES AS DESCRIBED BY CMS-2020-01-R: HCPCS | Performed by: UROLOGY

## 2021-04-17 PROCEDURE — U0005 INFEC AGEN DETEC AMPLI PROBE: HCPCS | Performed by: UROLOGY

## 2021-04-18 ENCOUNTER — PATIENT MESSAGE (OUTPATIENT)
Dept: SURGERY | Facility: HOSPITAL | Age: 71
End: 2021-04-18

## 2021-04-18 LAB — SARS-COV-2 RNA RESP QL NAA+PROBE: NOT DETECTED

## 2021-04-19 ENCOUNTER — ANESTHESIA EVENT (OUTPATIENT)
Dept: SURGERY | Facility: HOSPITAL | Age: 71
End: 2021-04-19
Payer: MEDICARE

## 2021-04-19 LAB — FUNGUS SPEC CULT: NORMAL

## 2021-04-20 ENCOUNTER — ANESTHESIA (OUTPATIENT)
Dept: SURGERY | Facility: HOSPITAL | Age: 71
End: 2021-04-20
Payer: MEDICARE

## 2021-04-20 ENCOUNTER — HOSPITAL ENCOUNTER (OUTPATIENT)
Facility: HOSPITAL | Age: 71
Discharge: HOME | End: 2021-04-20
Attending: UROLOGY | Admitting: UROLOGY
Payer: MEDICARE

## 2021-04-20 DIAGNOSIS — N20.0 NEPHROLITHIASIS: Primary | ICD-10-CM

## 2021-04-20 LAB
ANION GAP SERPL CALC-SCNC: 12 MMOL/L (ref 8–16)
APTT BLDCRRT: 27.9 SEC (ref 21–32)
BASOPHILS # BLD AUTO: 0.01 K/UL (ref 0–0.2)
BASOPHILS NFR BLD: 0.2 % (ref 0–1.9)
BUN SERPL-MCNC: 19 MG/DL (ref 8–23)
CALCIUM SERPL-MCNC: 8.8 MG/DL (ref 8.7–10.5)
CHLORIDE SERPL-SCNC: 103 MMOL/L (ref 95–110)
CO2 SERPL-SCNC: 28 MMOL/L (ref 23–29)
CREAT SERPL-MCNC: 3.9 MG/DL (ref 0.5–1.4)
DIFFERENTIAL METHOD: ABNORMAL
EOSINOPHIL # BLD AUTO: 0.2 K/UL (ref 0–0.5)
EOSINOPHIL NFR BLD: 3.1 % (ref 0–8)
ERYTHROCYTE [DISTWIDTH] IN BLOOD BY AUTOMATED COUNT: 14.8 % (ref 11.5–14.5)
EST. GFR  (AFRICAN AMERICAN): 13 ML/MIN/1.73 M^2
EST. GFR  (NON AFRICAN AMERICAN): 11 ML/MIN/1.73 M^2
GLUCOSE SERPL-MCNC: 70 MG/DL (ref 70–110)
HCT VFR BLD AUTO: 36.2 % (ref 37–48.5)
HGB BLD-MCNC: 11.3 G/DL (ref 12–16)
IMM GRANULOCYTES # BLD AUTO: 0.02 K/UL (ref 0–0.04)
IMM GRANULOCYTES NFR BLD AUTO: 0.3 % (ref 0–0.5)
INR PPP: 1.2 (ref 0.8–1.2)
LYMPHOCYTES # BLD AUTO: 1.4 K/UL (ref 1–4.8)
LYMPHOCYTES NFR BLD: 24.1 % (ref 18–48)
MCH RBC QN AUTO: 33.5 PG (ref 27–31)
MCHC RBC AUTO-ENTMCNC: 31.2 G/DL (ref 32–36)
MCV RBC AUTO: 107 FL (ref 82–98)
MONOCYTES # BLD AUTO: 0.3 K/UL (ref 0.3–1)
MONOCYTES NFR BLD: 5.8 % (ref 4–15)
NEUTROPHILS # BLD AUTO: 3.9 K/UL (ref 1.8–7.7)
NEUTROPHILS NFR BLD: 66.5 % (ref 38–73)
NRBC BLD-RTO: 0 /100 WBC
PLATELET # BLD AUTO: 169 K/UL (ref 150–450)
PMV BLD AUTO: 9.3 FL (ref 9.2–12.9)
POTASSIUM SERPL-SCNC: 4 MMOL/L (ref 3.5–5.1)
PROTHROMBIN TIME: 12.3 SEC (ref 9–12.5)
RBC # BLD AUTO: 3.37 M/UL (ref 4–5.4)
SODIUM SERPL-SCNC: 143 MMOL/L (ref 136–145)
WBC # BLD AUTO: 5.82 K/UL (ref 3.9–12.7)

## 2021-04-20 PROCEDURE — C1894 INTRO/SHEATH, NON-LASER: HCPCS | Performed by: UROLOGY

## 2021-04-20 PROCEDURE — 71000039 HC RECOVERY, EACH ADD'L HOUR: Performed by: UROLOGY

## 2021-04-20 PROCEDURE — 99900104 DSU ONLY-NO CHARGE-EA ADD'L HR (STAT): Performed by: UROLOGY

## 2021-04-20 PROCEDURE — 99900103 DSU ONLY-NO CHARGE-INITIAL HR (STAT): Performed by: UROLOGY

## 2021-04-20 PROCEDURE — 71000015 HC POSTOP RECOV 1ST HR: Performed by: UROLOGY

## 2021-04-20 PROCEDURE — 25500020 PHARM REV CODE 255: Performed by: UROLOGY

## 2021-04-20 PROCEDURE — C1773 RET DEV, INSERTABLE: HCPCS | Performed by: UROLOGY

## 2021-04-20 PROCEDURE — 63600175 PHARM REV CODE 636 W HCPCS: Performed by: ANESTHESIOLOGY

## 2021-04-20 PROCEDURE — C2617 STENT, NON-COR, TEM W/O DEL: HCPCS | Performed by: UROLOGY

## 2021-04-20 PROCEDURE — C1769 GUIDE WIRE: HCPCS | Performed by: UROLOGY

## 2021-04-20 PROCEDURE — 27201423 OPTIME MED/SURG SUP & DEVICES STERILE SUPPLY: Performed by: UROLOGY

## 2021-04-20 PROCEDURE — 36000706: Performed by: UROLOGY

## 2021-04-20 PROCEDURE — 82365 CALCULUS SPECTROSCOPY: CPT | Performed by: UROLOGY

## 2021-04-20 PROCEDURE — 37000008 HC ANESTHESIA 1ST 15 MINUTES: Performed by: UROLOGY

## 2021-04-20 PROCEDURE — 25000003 PHARM REV CODE 250: Performed by: REGISTERED NURSE

## 2021-04-20 PROCEDURE — D9220A PRA ANESTHESIA: Mod: CRNA,,, | Performed by: REGISTERED NURSE

## 2021-04-20 PROCEDURE — 85025 COMPLETE CBC W/AUTO DIFF WBC: CPT | Performed by: ANESTHESIOLOGY

## 2021-04-20 PROCEDURE — D9220A PRA ANESTHESIA: ICD-10-PCS | Mod: CRNA,,, | Performed by: REGISTERED NURSE

## 2021-04-20 PROCEDURE — 80048 BASIC METABOLIC PNL TOTAL CA: CPT | Performed by: ANESTHESIOLOGY

## 2021-04-20 PROCEDURE — 71000016 HC POSTOP RECOV ADDL HR: Performed by: UROLOGY

## 2021-04-20 PROCEDURE — 25000003 PHARM REV CODE 250: Performed by: ANESTHESIOLOGY

## 2021-04-20 PROCEDURE — 63600175 PHARM REV CODE 636 W HCPCS: Performed by: REGISTERED NURSE

## 2021-04-20 PROCEDURE — 37000009 HC ANESTHESIA EA ADD 15 MINS: Performed by: UROLOGY

## 2021-04-20 PROCEDURE — C1758 CATHETER, URETERAL: HCPCS | Performed by: UROLOGY

## 2021-04-20 PROCEDURE — 52356 CYSTO/URETERO W/LITHOTRIPSY: CPT | Mod: RT,,, | Performed by: UROLOGY

## 2021-04-20 PROCEDURE — 36000707: Performed by: UROLOGY

## 2021-04-20 PROCEDURE — 85730 THROMBOPLASTIN TIME PARTIAL: CPT | Performed by: ANESTHESIOLOGY

## 2021-04-20 PROCEDURE — 85610 PROTHROMBIN TIME: CPT | Performed by: ANESTHESIOLOGY

## 2021-04-20 PROCEDURE — 27200651 HC AIRWAY, LMA: Performed by: ANESTHESIOLOGY

## 2021-04-20 PROCEDURE — 36415 COLL VENOUS BLD VENIPUNCTURE: CPT | Performed by: ANESTHESIOLOGY

## 2021-04-20 PROCEDURE — 25000003 PHARM REV CODE 250: Performed by: UROLOGY

## 2021-04-20 PROCEDURE — 71000033 HC RECOVERY, INTIAL HOUR: Performed by: UROLOGY

## 2021-04-20 PROCEDURE — 52356 PR CYSTO/URETERO W/LITHOTRIPSY: ICD-10-PCS | Mod: RT,,, | Performed by: UROLOGY

## 2021-04-20 PROCEDURE — D9220A PRA ANESTHESIA: ICD-10-PCS | Mod: ANES,,, | Performed by: ANESTHESIOLOGY

## 2021-04-20 PROCEDURE — D9220A PRA ANESTHESIA: Mod: ANES,,, | Performed by: ANESTHESIOLOGY

## 2021-04-20 DEVICE — STENT DBL PGTL URET 6FR 26CM: Type: IMPLANTABLE DEVICE | Site: URETER | Status: FUNCTIONAL

## 2021-04-20 RX ORDER — DIPHENHYDRAMINE HYDROCHLORIDE 50 MG/ML
6.25 INJECTION INTRAMUSCULAR; INTRAVENOUS ONCE
Status: COMPLETED | OUTPATIENT
Start: 2021-04-20 | End: 2021-04-20

## 2021-04-20 RX ORDER — PROPOFOL 10 MG/ML
VIAL (ML) INTRAVENOUS
Status: DISCONTINUED | OUTPATIENT
Start: 2021-04-20 | End: 2021-04-20

## 2021-04-20 RX ORDER — TAMSULOSIN HYDROCHLORIDE 0.4 MG/1
0.4 CAPSULE ORAL DAILY
Qty: 21 CAPSULE | Refills: 0 | Status: SHIPPED | OUTPATIENT
Start: 2021-04-20 | End: 2021-04-20 | Stop reason: SDUPTHER

## 2021-04-20 RX ORDER — PHENYLEPHRINE HYDROCHLORIDE 10 MG/ML
INJECTION INTRAVENOUS
Status: DISCONTINUED | OUTPATIENT
Start: 2021-04-20 | End: 2021-04-20

## 2021-04-20 RX ORDER — SODIUM CHLORIDE, SODIUM LACTATE, POTASSIUM CHLORIDE, CALCIUM CHLORIDE 600; 310; 30; 20 MG/100ML; MG/100ML; MG/100ML; MG/100ML
INJECTION, SOLUTION INTRAVENOUS CONTINUOUS
Status: ACTIVE | OUTPATIENT
Start: 2021-04-20

## 2021-04-20 RX ORDER — TAMSULOSIN HYDROCHLORIDE 0.4 MG/1
0.4 CAPSULE ORAL DAILY
Qty: 21 CAPSULE | Refills: 0 | Status: SHIPPED | OUTPATIENT
Start: 2021-04-20 | End: 2021-05-17 | Stop reason: CLARIF

## 2021-04-20 RX ORDER — DEXAMETHASONE SODIUM PHOSPHATE 4 MG/ML
INJECTION, SOLUTION INTRA-ARTICULAR; INTRALESIONAL; INTRAMUSCULAR; INTRAVENOUS; SOFT TISSUE
Status: DISCONTINUED | OUTPATIENT
Start: 2021-04-20 | End: 2021-04-20

## 2021-04-20 RX ORDER — LIDOCAINE HYDROCHLORIDE 20 MG/ML
INJECTION INTRAVENOUS
Status: DISCONTINUED | OUTPATIENT
Start: 2021-04-20 | End: 2021-04-20

## 2021-04-20 RX ORDER — SODIUM CHLORIDE 9 MG/ML
INJECTION, SOLUTION INTRAVENOUS CONTINUOUS
Status: DISCONTINUED | OUTPATIENT
Start: 2021-04-20 | End: 2021-04-20 | Stop reason: HOSPADM

## 2021-04-20 RX ORDER — ACETAMINOPHEN 10 MG/ML
INJECTION, SOLUTION INTRAVENOUS
Status: DISCONTINUED | OUTPATIENT
Start: 2021-04-20 | End: 2021-04-20

## 2021-04-20 RX ORDER — HYDROMORPHONE HYDROCHLORIDE 2 MG/ML
0.2 INJECTION, SOLUTION INTRAMUSCULAR; INTRAVENOUS; SUBCUTANEOUS EVERY 5 MIN PRN
Status: DISCONTINUED | OUTPATIENT
Start: 2021-04-20 | End: 2022-01-01

## 2021-04-20 RX ORDER — DIPHENHYDRAMINE HCL 25 MG
25 CAPSULE ORAL EVERY 12 HOURS PRN
COMMUNITY
End: 2022-01-01

## 2021-04-20 RX ORDER — ONDANSETRON 2 MG/ML
INJECTION INTRAMUSCULAR; INTRAVENOUS
Status: DISCONTINUED | OUTPATIENT
Start: 2021-04-20 | End: 2021-04-20

## 2021-04-20 RX ORDER — HYDROCODONE BITARTRATE AND ACETAMINOPHEN 5; 325 MG/1; MG/1
1 TABLET ORAL EVERY 6 HOURS PRN
Qty: 10 TABLET | Refills: 0 | Status: SHIPPED | OUTPATIENT
Start: 2021-04-20 | End: 2021-04-25

## 2021-04-20 RX ORDER — DOXYCYCLINE 100 MG/1
100 CAPSULE ORAL EVERY 12 HOURS
Qty: 6 CAPSULE | Refills: 0 | Status: SHIPPED | OUTPATIENT
Start: 2021-04-20 | End: 2021-04-23

## 2021-04-20 RX ORDER — CLINDAMYCIN PHOSPHATE 900 MG/50ML
900 INJECTION, SOLUTION INTRAVENOUS
Status: COMPLETED | OUTPATIENT
Start: 2021-04-20 | End: 2021-04-20

## 2021-04-20 RX ORDER — FENTANYL CITRATE 50 UG/ML
INJECTION, SOLUTION INTRAMUSCULAR; INTRAVENOUS
Status: DISCONTINUED | OUTPATIENT
Start: 2021-04-20 | End: 2021-04-20

## 2021-04-20 RX ORDER — FENTANYL CITRATE 50 UG/ML
25 INJECTION, SOLUTION INTRAMUSCULAR; INTRAVENOUS EVERY 5 MIN PRN
Status: COMPLETED | OUTPATIENT
Start: 2021-04-20 | End: 2021-04-20

## 2021-04-20 RX ORDER — OXYCODONE HYDROCHLORIDE 5 MG/1
5 TABLET ORAL
Status: DISCONTINUED | OUTPATIENT
Start: 2021-04-20 | End: 2022-01-01

## 2021-04-20 RX ADMIN — SODIUM CHLORIDE: 0.9 INJECTION, SOLUTION INTRAVENOUS at 10:04

## 2021-04-20 RX ADMIN — CLINDAMYCIN PHOSPHATE 900 MG: 18 INJECTION, SOLUTION INTRAVENOUS at 12:04

## 2021-04-20 RX ADMIN — PHENYLEPHRINE HYDROCHLORIDE 100 MCG: 10 INJECTION INTRAVENOUS at 01:04

## 2021-04-20 RX ADMIN — DIPHENHYDRAMINE HYDROCHLORIDE 6.25 MG: 50 INJECTION INTRAMUSCULAR; INTRAVENOUS at 03:04

## 2021-04-20 RX ADMIN — FENTANYL CITRATE 25 MCG: 50 INJECTION INTRAMUSCULAR; INTRAVENOUS at 02:04

## 2021-04-20 RX ADMIN — GLYCOPYRROLATE 0.2 MG: 0.2 INJECTION, SOLUTION INTRAMUSCULAR; INTRAVITREAL at 11:04

## 2021-04-20 RX ADMIN — OXYCODONE HYDROCHLORIDE 5 MG: 5 TABLET ORAL at 02:04

## 2021-04-20 RX ADMIN — PROPOFOL 50 MG: 10 INJECTION, EMULSION INTRAVENOUS at 11:04

## 2021-04-20 RX ADMIN — FENTANYL CITRATE 50 MCG: 50 INJECTION, SOLUTION INTRAMUSCULAR; INTRAVENOUS at 12:04

## 2021-04-20 RX ADMIN — FENTANYL CITRATE 50 MCG: 50 INJECTION, SOLUTION INTRAMUSCULAR; INTRAVENOUS at 11:04

## 2021-04-20 RX ADMIN — PHENYLEPHRINE HYDROCHLORIDE 100 MCG: 10 INJECTION INTRAVENOUS at 12:04

## 2021-04-20 RX ADMIN — ACETAMINOPHEN 1000 MG: 10 INJECTION, SOLUTION INTRAVENOUS at 12:04

## 2021-04-20 RX ADMIN — PROPOFOL 50 MG: 10 INJECTION, EMULSION INTRAVENOUS at 12:04

## 2021-04-20 RX ADMIN — LIDOCAINE HYDROCHLORIDE 50 MG: 20 INJECTION, SOLUTION INTRAVENOUS at 11:04

## 2021-04-20 RX ADMIN — PROPOFOL 100 MG: 10 INJECTION, EMULSION INTRAVENOUS at 11:04

## 2021-04-20 RX ADMIN — ONDANSETRON 4 MG: 2 INJECTION, SOLUTION INTRAMUSCULAR; INTRAVENOUS at 12:04

## 2021-04-20 RX ADMIN — DEXAMETHASONE SODIUM PHOSPHATE 4 MG: 4 INJECTION, SOLUTION INTRA-ARTICULAR; INTRALESIONAL; INTRAMUSCULAR; INTRAVENOUS; SOFT TISSUE at 12:04

## 2021-04-21 VITALS
BODY MASS INDEX: 21.22 KG/M2 | SYSTOLIC BLOOD PRESSURE: 128 MMHG | RESPIRATION RATE: 16 BRPM | WEIGHT: 140 LBS | OXYGEN SATURATION: 96 % | HEIGHT: 68 IN | HEART RATE: 73 BPM | DIASTOLIC BLOOD PRESSURE: 67 MMHG | TEMPERATURE: 98 F

## 2021-04-23 LAB
COMPN STONE: NORMAL
SPECIMEN SOURCE: NORMAL
STONE ANALYSIS IR-IMP: NORMAL

## 2021-04-29 ENCOUNTER — PATIENT MESSAGE (OUTPATIENT)
Dept: RESEARCH | Facility: HOSPITAL | Age: 71
End: 2021-04-29

## 2021-05-05 LAB
ACID FAST MOD KINY STN SPEC: NORMAL
MYCOBACTERIUM SPEC QL CULT: NORMAL

## 2021-05-11 ENCOUNTER — PATIENT MESSAGE (OUTPATIENT)
Dept: FAMILY MEDICINE | Facility: CLINIC | Age: 71
End: 2021-05-11

## 2021-05-11 DIAGNOSIS — K13.79 MOUTH PAIN: ICD-10-CM

## 2021-05-11 RX ORDER — TRAMADOL HYDROCHLORIDE 50 MG/1
50 TABLET ORAL EVERY 4 HOURS PRN
Qty: 42 TABLET | Refills: 0 | OUTPATIENT
Start: 2021-05-11

## 2021-05-13 ENCOUNTER — OFFICE VISIT (OUTPATIENT)
Dept: UROLOGY | Facility: CLINIC | Age: 71
End: 2021-05-13
Payer: MEDICARE

## 2021-05-13 VITALS
WEIGHT: 140 LBS | BODY MASS INDEX: 21.22 KG/M2 | HEIGHT: 68 IN | SYSTOLIC BLOOD PRESSURE: 129 MMHG | DIASTOLIC BLOOD PRESSURE: 77 MMHG | HEART RATE: 75 BPM

## 2021-05-13 DIAGNOSIS — K13.79 MOUTH PAIN: ICD-10-CM

## 2021-05-13 DIAGNOSIS — R33.9 URINARY RETENTION: ICD-10-CM

## 2021-05-13 DIAGNOSIS — N20.0 NEPHROLITHIASIS: Primary | ICD-10-CM

## 2021-05-13 PROCEDURE — 99999 PR PBB SHADOW E&M-EST. PATIENT-LVL IV: CPT | Mod: PBBFAC,,, | Performed by: UROLOGY

## 2021-05-13 PROCEDURE — 3008F BODY MASS INDEX DOCD: CPT | Mod: S$GLB,,, | Performed by: UROLOGY

## 2021-05-13 PROCEDURE — 3288F PR FALLS RISK ASSESSMENT DOCUMENTED: ICD-10-PCS | Mod: S$GLB,,, | Performed by: UROLOGY

## 2021-05-13 PROCEDURE — 1159F MED LIST DOCD IN RCRD: CPT | Mod: S$GLB,,, | Performed by: UROLOGY

## 2021-05-13 PROCEDURE — 3288F FALL RISK ASSESSMENT DOCD: CPT | Mod: S$GLB,,, | Performed by: UROLOGY

## 2021-05-13 PROCEDURE — 1101F PT FALLS ASSESS-DOCD LE1/YR: CPT | Mod: S$GLB,,, | Performed by: UROLOGY

## 2021-05-13 PROCEDURE — 99999 PR PBB SHADOW E&M-EST. PATIENT-LVL IV: ICD-10-PCS | Mod: PBBFAC,,, | Performed by: UROLOGY

## 2021-05-13 PROCEDURE — 99214 OFFICE O/P EST MOD 30 MIN: CPT | Mod: S$GLB,,, | Performed by: UROLOGY

## 2021-05-13 PROCEDURE — 1159F PR MEDICATION LIST DOCUMENTED IN MEDICAL RECORD: ICD-10-PCS | Mod: S$GLB,,, | Performed by: UROLOGY

## 2021-05-13 PROCEDURE — 1125F PR PAIN SEVERITY QUANTIFIED, PAIN PRESENT: ICD-10-PCS | Mod: S$GLB,,, | Performed by: UROLOGY

## 2021-05-13 PROCEDURE — 1101F PR PT FALLS ASSESS DOC 0-1 FALLS W/OUT INJ PAST YR: ICD-10-PCS | Mod: S$GLB,,, | Performed by: UROLOGY

## 2021-05-13 PROCEDURE — 3008F PR BODY MASS INDEX (BMI) DOCUMENTED: ICD-10-PCS | Mod: S$GLB,,, | Performed by: UROLOGY

## 2021-05-13 PROCEDURE — 99214 PR OFFICE/OUTPT VISIT, EST, LEVL IV, 30-39 MIN: ICD-10-PCS | Mod: S$GLB,,, | Performed by: UROLOGY

## 2021-05-13 PROCEDURE — 1125F AMNT PAIN NOTED PAIN PRSNT: CPT | Mod: S$GLB,,, | Performed by: UROLOGY

## 2021-05-13 RX ORDER — TRAMADOL HYDROCHLORIDE 50 MG/1
50 TABLET ORAL EVERY 4 HOURS PRN
Qty: 42 TABLET | Refills: 0 | OUTPATIENT
Start: 2021-05-13

## 2021-05-13 RX ORDER — LANOLIN ALCOHOL/MO/W.PET/CERES
CREAM (GRAM) TOPICAL
COMMUNITY
End: 2021-05-17 | Stop reason: CLARIF

## 2021-05-13 RX ORDER — CYCLOBENZAPRINE HCL 10 MG
10 TABLET ORAL 3 TIMES DAILY PRN
COMMUNITY
End: 2021-05-24 | Stop reason: SDUPTHER

## 2021-05-13 RX ORDER — CEPHALEXIN 500 MG/1
500 CAPSULE ORAL EVERY 12 HOURS
Qty: 6 CAPSULE | Refills: 0 | Status: SHIPPED | OUTPATIENT
Start: 2021-05-13 | End: 2021-05-17 | Stop reason: CLARIF

## 2021-05-17 ENCOUNTER — TELEPHONE (OUTPATIENT)
Dept: UROLOGY | Facility: CLINIC | Age: 71
End: 2021-05-17

## 2021-05-17 ENCOUNTER — HOSPITAL ENCOUNTER (INPATIENT)
Facility: HOSPITAL | Age: 71
LOS: 2 days | Discharge: HOME | DRG: 853 | End: 2021-05-19
Attending: EMERGENCY MEDICINE | Admitting: HOSPITALIST
Payer: MEDICARE

## 2021-05-17 DIAGNOSIS — R50.9 FEVER: ICD-10-CM

## 2021-05-17 DIAGNOSIS — R07.9 CHEST PAIN: ICD-10-CM

## 2021-05-17 DIAGNOSIS — N12 PYELONEPHRITIS: Primary | ICD-10-CM

## 2021-05-17 DIAGNOSIS — N18.6 ESRD (END STAGE RENAL DISEASE): ICD-10-CM

## 2021-05-17 DIAGNOSIS — A41.9 SEPSIS: ICD-10-CM

## 2021-05-17 LAB
ALBUMIN SERPL BCP-MCNC: 3.3 G/DL (ref 3.5–5.2)
ALP SERPL-CCNC: 171 U/L (ref 55–135)
ALT SERPL W/O P-5'-P-CCNC: 9 U/L (ref 10–44)
ANION GAP SERPL CALC-SCNC: 15 MMOL/L (ref 8–16)
AST SERPL-CCNC: 9 U/L (ref 10–40)
BACTERIA #/AREA URNS HPF: ABNORMAL /HPF
BASOPHILS # BLD AUTO: 0.02 K/UL (ref 0–0.2)
BASOPHILS NFR BLD: 0.2 % (ref 0–1.9)
BILIRUB SERPL-MCNC: 0.7 MG/DL (ref 0.1–1)
BILIRUB UR QL STRIP: NEGATIVE
BUN SERPL-MCNC: 17 MG/DL (ref 8–23)
CALCIUM SERPL-MCNC: 8.7 MG/DL (ref 8.7–10.5)
CHLORIDE SERPL-SCNC: 97 MMOL/L (ref 95–110)
CLARITY UR: ABNORMAL
CO2 SERPL-SCNC: 27 MMOL/L (ref 23–29)
COLOR UR: YELLOW
CREAT SERPL-MCNC: 3.7 MG/DL (ref 0.5–1.4)
DIFFERENTIAL METHOD: ABNORMAL
EOSINOPHIL # BLD AUTO: 0 K/UL (ref 0–0.5)
EOSINOPHIL NFR BLD: 0.2 % (ref 0–8)
ERYTHROCYTE [DISTWIDTH] IN BLOOD BY AUTOMATED COUNT: 14.2 % (ref 11.5–14.5)
EST. GFR  (AFRICAN AMERICAN): 13 ML/MIN/1.73 M^2
EST. GFR  (NON AFRICAN AMERICAN): 12 ML/MIN/1.73 M^2
GLUCOSE SERPL-MCNC: 107 MG/DL (ref 70–110)
GLUCOSE UR QL STRIP: NEGATIVE
HCT VFR BLD AUTO: 35 % (ref 37–48.5)
HGB BLD-MCNC: 11.4 G/DL (ref 12–16)
HGB UR QL STRIP: ABNORMAL
HYALINE CASTS #/AREA URNS LPF: 0 /LPF
IMM GRANULOCYTES # BLD AUTO: 0.05 K/UL (ref 0–0.04)
IMM GRANULOCYTES NFR BLD AUTO: 0.6 % (ref 0–0.5)
KETONES UR QL STRIP: NEGATIVE
LACTATE SERPL-SCNC: 1.6 MMOL/L (ref 0.5–2.2)
LEUKOCYTE ESTERASE UR QL STRIP: ABNORMAL
LYMPHOCYTES # BLD AUTO: 0.4 K/UL (ref 1–4.8)
LYMPHOCYTES NFR BLD: 4.5 % (ref 18–48)
MAGNESIUM SERPL-MCNC: 1.6 MG/DL (ref 1.6–2.6)
MCH RBC QN AUTO: 33.2 PG (ref 27–31)
MCHC RBC AUTO-ENTMCNC: 32.6 G/DL (ref 32–36)
MCV RBC AUTO: 102 FL (ref 82–98)
MICROSCOPIC COMMENT: ABNORMAL
MONOCYTES # BLD AUTO: 0.8 K/UL (ref 0.3–1)
MONOCYTES NFR BLD: 8.9 % (ref 4–15)
NEUTROPHILS # BLD AUTO: 7.2 K/UL (ref 1.8–7.7)
NEUTROPHILS NFR BLD: 85.6 % (ref 38–73)
NITRITE UR QL STRIP: NEGATIVE
NRBC BLD-RTO: 0 /100 WBC
PH UR STRIP: 8 [PH] (ref 5–8)
PLATELET # BLD AUTO: 180 K/UL (ref 150–450)
PMV BLD AUTO: 9.8 FL (ref 9.2–12.9)
POTASSIUM SERPL-SCNC: 3.1 MMOL/L (ref 3.5–5.1)
PROCALCITONIN SERPL IA-MCNC: 6.98 NG/ML
PROT SERPL-MCNC: 6.9 G/DL (ref 6–8.4)
PROT UR QL STRIP: ABNORMAL
RBC # BLD AUTO: 3.43 M/UL (ref 4–5.4)
RBC #/AREA URNS HPF: 7 /HPF (ref 0–4)
SARS-COV-2 RDRP RESP QL NAA+PROBE: NEGATIVE
SODIUM SERPL-SCNC: 139 MMOL/L (ref 136–145)
SP GR UR STRIP: 1.01 (ref 1–1.03)
SQUAMOUS #/AREA URNS HPF: 1 /HPF
URN SPEC COLLECT METH UR: ABNORMAL
UROBILINOGEN UR STRIP-ACNC: NEGATIVE EU/DL
WBC # BLD AUTO: 8.46 K/UL (ref 3.9–12.7)
WBC #/AREA URNS HPF: 80 /HPF (ref 0–5)
WBC CLUMPS URNS QL MICRO: ABNORMAL

## 2021-05-17 PROCEDURE — 83605 ASSAY OF LACTIC ACID: CPT | Performed by: NURSE PRACTITIONER

## 2021-05-17 PROCEDURE — 93005 ELECTROCARDIOGRAM TRACING: CPT

## 2021-05-17 PROCEDURE — 63600175 PHARM REV CODE 636 W HCPCS: Performed by: EMERGENCY MEDICINE

## 2021-05-17 PROCEDURE — 96375 TX/PRO/DX INJ NEW DRUG ADDON: CPT

## 2021-05-17 PROCEDURE — 12000002 HC ACUTE/MED SURGE SEMI-PRIVATE ROOM

## 2021-05-17 PROCEDURE — 85025 COMPLETE CBC W/AUTO DIFF WBC: CPT | Performed by: NURSE PRACTITIONER

## 2021-05-17 PROCEDURE — 81000 URINALYSIS NONAUTO W/SCOPE: CPT | Performed by: NURSE PRACTITIONER

## 2021-05-17 PROCEDURE — 96361 HYDRATE IV INFUSION ADD-ON: CPT

## 2021-05-17 PROCEDURE — 99285 EMERGENCY DEPT VISIT HI MDM: CPT | Mod: 25

## 2021-05-17 PROCEDURE — 25000003 PHARM REV CODE 250: Performed by: NURSE PRACTITIONER

## 2021-05-17 PROCEDURE — 87040 BLOOD CULTURE FOR BACTERIA: CPT | Mod: 59 | Performed by: NURSE PRACTITIONER

## 2021-05-17 PROCEDURE — U0002 COVID-19 LAB TEST NON-CDC: HCPCS | Performed by: EMERGENCY MEDICINE

## 2021-05-17 PROCEDURE — 83735 ASSAY OF MAGNESIUM: CPT | Performed by: NURSE PRACTITIONER

## 2021-05-17 PROCEDURE — 80053 COMPREHEN METABOLIC PANEL: CPT | Performed by: NURSE PRACTITIONER

## 2021-05-17 PROCEDURE — 36415 COLL VENOUS BLD VENIPUNCTURE: CPT | Performed by: NURSE PRACTITIONER

## 2021-05-17 PROCEDURE — 96365 THER/PROPH/DIAG IV INF INIT: CPT

## 2021-05-17 PROCEDURE — 87086 URINE CULTURE/COLONY COUNT: CPT | Performed by: NURSE PRACTITIONER

## 2021-05-17 PROCEDURE — 84145 PROCALCITONIN (PCT): CPT | Performed by: NURSE PRACTITIONER

## 2021-05-17 RX ORDER — DILTIAZEM HYDROCHLORIDE 120 MG/1
120 CAPSULE, COATED, EXTENDED RELEASE ORAL DAILY
Status: DISCONTINUED | OUTPATIENT
Start: 2021-05-18 | End: 2021-05-19 | Stop reason: HOSPADM

## 2021-05-17 RX ORDER — SODIUM CHLORIDE 9 MG/ML
INJECTION, SOLUTION INTRAVENOUS
Status: COMPLETED | OUTPATIENT
Start: 2021-05-17 | End: 2021-05-17

## 2021-05-17 RX ORDER — IBUPROFEN 200 MG
16 TABLET ORAL
Status: DISCONTINUED | OUTPATIENT
Start: 2021-05-17 | End: 2021-05-19 | Stop reason: HOSPADM

## 2021-05-17 RX ORDER — SODIUM CHLORIDE 9 MG/ML
INJECTION, SOLUTION INTRAVENOUS CONTINUOUS
Status: DISCONTINUED | OUTPATIENT
Start: 2021-05-18 | End: 2021-05-19 | Stop reason: HOSPADM

## 2021-05-17 RX ORDER — IBUPROFEN 200 MG
24 TABLET ORAL
Status: DISCONTINUED | OUTPATIENT
Start: 2021-05-17 | End: 2021-05-19 | Stop reason: HOSPADM

## 2021-05-17 RX ORDER — GLUCAGON 1 MG
1 KIT INJECTION
Status: DISCONTINUED | OUTPATIENT
Start: 2021-05-17 | End: 2021-05-19 | Stop reason: HOSPADM

## 2021-05-17 RX ORDER — KETOROLAC TROMETHAMINE 30 MG/ML
10 INJECTION, SOLUTION INTRAMUSCULAR; INTRAVENOUS
Status: COMPLETED | OUTPATIENT
Start: 2021-05-17 | End: 2021-05-17

## 2021-05-17 RX ORDER — HYDROMORPHONE HYDROCHLORIDE 2 MG/ML
1 INJECTION, SOLUTION INTRAMUSCULAR; INTRAVENOUS; SUBCUTANEOUS EVERY 6 HOURS PRN
Status: DISCONTINUED | OUTPATIENT
Start: 2021-05-18 | End: 2021-05-19 | Stop reason: HOSPADM

## 2021-05-17 RX ORDER — ONDANSETRON 2 MG/ML
4 INJECTION INTRAMUSCULAR; INTRAVENOUS EVERY 6 HOURS PRN
Status: DISCONTINUED | OUTPATIENT
Start: 2021-05-18 | End: 2021-05-19 | Stop reason: HOSPADM

## 2021-05-17 RX ORDER — SODIUM CHLORIDE 0.9 % (FLUSH) 0.9 %
10 SYRINGE (ML) INJECTION
Status: DISCONTINUED | OUTPATIENT
Start: 2021-05-17 | End: 2021-05-19 | Stop reason: HOSPADM

## 2021-05-17 RX ORDER — ACETAMINOPHEN 325 MG/1
650 TABLET ORAL EVERY 4 HOURS PRN
Status: DISCONTINUED | OUTPATIENT
Start: 2021-05-17 | End: 2021-05-19 | Stop reason: HOSPADM

## 2021-05-17 RX ORDER — SERTRALINE HYDROCHLORIDE 50 MG/1
50 TABLET, FILM COATED ORAL NIGHTLY
Status: DISCONTINUED | OUTPATIENT
Start: 2021-05-18 | End: 2021-05-18

## 2021-05-17 RX ORDER — DIPHENHYDRAMINE HCL 25 MG
25 CAPSULE ORAL EVERY 6 HOURS PRN
Status: DISCONTINUED | OUTPATIENT
Start: 2021-05-17 | End: 2021-05-19 | Stop reason: HOSPADM

## 2021-05-17 RX ORDER — FERRIC CITRATE 210 MG/1
2 TABLET, COATED ORAL
COMMUNITY

## 2021-05-17 RX ORDER — LEVOTHYROXINE SODIUM 88 UG/1
88 TABLET ORAL
Status: DISCONTINUED | OUTPATIENT
Start: 2021-05-18 | End: 2021-05-19 | Stop reason: HOSPADM

## 2021-05-17 RX ADMIN — HYDROMORPHONE HYDROCHLORIDE 1 MG: 2 INJECTION INTRAMUSCULAR; INTRAVENOUS; SUBCUTANEOUS at 11:05

## 2021-05-17 RX ADMIN — KETOROLAC TROMETHAMINE 10 MG: 30 INJECTION, SOLUTION INTRAMUSCULAR; INTRAVENOUS at 04:05

## 2021-05-17 RX ADMIN — CEFTRIAXONE 1 G: 1 INJECTION, SOLUTION INTRAVENOUS at 07:05

## 2021-05-17 RX ADMIN — SODIUM CHLORIDE: 0.9 INJECTION, SOLUTION INTRAVENOUS at 04:05

## 2021-05-18 ENCOUNTER — ANESTHESIA EVENT (OUTPATIENT)
Dept: SURGERY | Facility: HOSPITAL | Age: 71
DRG: 853 | End: 2021-05-18
Payer: MEDICARE

## 2021-05-18 ENCOUNTER — ANESTHESIA (OUTPATIENT)
Dept: SURGERY | Facility: HOSPITAL | Age: 71
DRG: 853 | End: 2021-05-18
Payer: MEDICARE

## 2021-05-18 PROBLEM — E83.42 HYPOMAGNESEMIA: Status: ACTIVE | Noted: 2021-05-18

## 2021-05-18 LAB
ALBUMIN SERPL BCP-MCNC: 2.8 G/DL (ref 3.5–5.2)
ALP SERPL-CCNC: 149 U/L (ref 55–135)
ALT SERPL W/O P-5'-P-CCNC: 6 U/L (ref 10–44)
ANION GAP SERPL CALC-SCNC: 15 MMOL/L (ref 8–16)
AST SERPL-CCNC: 7 U/L (ref 10–40)
BACTERIA UR CULT: NORMAL
BACTERIA UR CULT: NORMAL
BASOPHILS # BLD AUTO: 0.01 K/UL (ref 0–0.2)
BASOPHILS NFR BLD: 0.2 % (ref 0–1.9)
BILIRUB SERPL-MCNC: 0.5 MG/DL (ref 0.1–1)
BUN SERPL-MCNC: 23 MG/DL (ref 8–23)
CALCIUM SERPL-MCNC: 8 MG/DL (ref 8.7–10.5)
CHLORIDE SERPL-SCNC: 99 MMOL/L (ref 95–110)
CO2 SERPL-SCNC: 25 MMOL/L (ref 23–29)
CREAT SERPL-MCNC: 4.6 MG/DL (ref 0.5–1.4)
DIFFERENTIAL METHOD: ABNORMAL
EOSINOPHIL # BLD AUTO: 0.1 K/UL (ref 0–0.5)
EOSINOPHIL NFR BLD: 1.3 % (ref 0–8)
ERYTHROCYTE [DISTWIDTH] IN BLOOD BY AUTOMATED COUNT: 14.1 % (ref 11.5–14.5)
EST. GFR  (AFRICAN AMERICAN): 10 ML/MIN/1.73 M^2
EST. GFR  (NON AFRICAN AMERICAN): 9 ML/MIN/1.73 M^2
GLUCOSE SERPL-MCNC: 80 MG/DL (ref 70–110)
HCT VFR BLD AUTO: 30.6 % (ref 37–48.5)
HGB BLD-MCNC: 9.8 G/DL (ref 12–16)
IMM GRANULOCYTES # BLD AUTO: 0.01 K/UL (ref 0–0.04)
IMM GRANULOCYTES NFR BLD AUTO: 0.2 % (ref 0–0.5)
LYMPHOCYTES # BLD AUTO: 0.8 K/UL (ref 1–4.8)
LYMPHOCYTES NFR BLD: 12.9 % (ref 18–48)
MAGNESIUM SERPL-MCNC: 1.5 MG/DL (ref 1.6–2.6)
MCH RBC QN AUTO: 33.6 PG (ref 27–31)
MCHC RBC AUTO-ENTMCNC: 32 G/DL (ref 32–36)
MCV RBC AUTO: 105 FL (ref 82–98)
MONOCYTES # BLD AUTO: 0.7 K/UL (ref 0.3–1)
MONOCYTES NFR BLD: 11.4 % (ref 4–15)
NEUTROPHILS # BLD AUTO: 4.5 K/UL (ref 1.8–7.7)
NEUTROPHILS NFR BLD: 74 % (ref 38–73)
NRBC BLD-RTO: 0 /100 WBC
PLATELET # BLD AUTO: 156 K/UL (ref 150–450)
PMV BLD AUTO: 9.9 FL (ref 9.2–12.9)
POTASSIUM SERPL-SCNC: 3 MMOL/L (ref 3.5–5.1)
PROT SERPL-MCNC: 5.7 G/DL (ref 6–8.4)
RBC # BLD AUTO: 2.92 M/UL (ref 4–5.4)
SODIUM SERPL-SCNC: 139 MMOL/L (ref 136–145)
WBC # BLD AUTO: 6.03 K/UL (ref 3.9–12.7)

## 2021-05-18 PROCEDURE — 25000003 PHARM REV CODE 250: Performed by: NURSE ANESTHETIST, CERTIFIED REGISTERED

## 2021-05-18 PROCEDURE — 37000008 HC ANESTHESIA 1ST 15 MINUTES: Performed by: UROLOGY

## 2021-05-18 PROCEDURE — 74420 UROGRAPHY RTRGR +-KUB: CPT | Mod: 26,,, | Performed by: UROLOGY

## 2021-05-18 PROCEDURE — 63600175 PHARM REV CODE 636 W HCPCS: Performed by: UROLOGY

## 2021-05-18 PROCEDURE — 36000707: Performed by: UROLOGY

## 2021-05-18 PROCEDURE — 52332 CYSTOSCOPY AND TREATMENT: CPT | Mod: RT,,, | Performed by: UROLOGY

## 2021-05-18 PROCEDURE — D9220A PRA ANESTHESIA: ICD-10-PCS | Mod: ANES,,, | Performed by: ANESTHESIOLOGY

## 2021-05-18 PROCEDURE — D9220A PRA ANESTHESIA: Mod: ANES,,, | Performed by: ANESTHESIOLOGY

## 2021-05-18 PROCEDURE — 80053 COMPREHEN METABOLIC PANEL: CPT | Performed by: NURSE PRACTITIONER

## 2021-05-18 PROCEDURE — 83735 ASSAY OF MAGNESIUM: CPT | Performed by: NURSE PRACTITIONER

## 2021-05-18 PROCEDURE — 25000003 PHARM REV CODE 250: Performed by: UROLOGY

## 2021-05-18 PROCEDURE — D9220A PRA ANESTHESIA: ICD-10-PCS | Mod: CRNA,,, | Performed by: NURSE ANESTHETIST, CERTIFIED REGISTERED

## 2021-05-18 PROCEDURE — 99900104 DSU ONLY-NO CHARGE-EA ADD'L HR (STAT): Performed by: UROLOGY

## 2021-05-18 PROCEDURE — 99223 PR INITIAL HOSPITAL CARE,LEVL III: ICD-10-PCS | Mod: 25,,, | Performed by: UROLOGY

## 2021-05-18 PROCEDURE — 99223 1ST HOSP IP/OBS HIGH 75: CPT | Mod: 25,,, | Performed by: UROLOGY

## 2021-05-18 PROCEDURE — 71000033 HC RECOVERY, INTIAL HOUR: Performed by: UROLOGY

## 2021-05-18 PROCEDURE — 36000706: Performed by: UROLOGY

## 2021-05-18 PROCEDURE — 94761 N-INVAS EAR/PLS OXIMETRY MLT: CPT

## 2021-05-18 PROCEDURE — 25000003 PHARM REV CODE 250: Performed by: NURSE PRACTITIONER

## 2021-05-18 PROCEDURE — 82365 CALCULUS SPECTROSCOPY: CPT | Performed by: UROLOGY

## 2021-05-18 PROCEDURE — 63600175 PHARM REV CODE 636 W HCPCS: Performed by: NURSE PRACTITIONER

## 2021-05-18 PROCEDURE — 85025 COMPLETE CBC W/AUTO DIFF WBC: CPT | Performed by: NURSE PRACTITIONER

## 2021-05-18 PROCEDURE — 12000002 HC ACUTE/MED SURGE SEMI-PRIVATE ROOM

## 2021-05-18 PROCEDURE — C2617 STENT, NON-COR, TEM W/O DEL: HCPCS | Performed by: UROLOGY

## 2021-05-18 PROCEDURE — 25000003 PHARM REV CODE 250: Performed by: ANESTHESIOLOGY

## 2021-05-18 PROCEDURE — 52332 PR CYSTOSCOPY,INSERT URETERAL STENT: ICD-10-PCS | Mod: RT,,, | Performed by: UROLOGY

## 2021-05-18 PROCEDURE — 27200651 HC AIRWAY, LMA: Performed by: ANESTHESIOLOGY

## 2021-05-18 PROCEDURE — 36415 COLL VENOUS BLD VENIPUNCTURE: CPT | Performed by: NURSE PRACTITIONER

## 2021-05-18 PROCEDURE — C1758 CATHETER, URETERAL: HCPCS | Performed by: UROLOGY

## 2021-05-18 PROCEDURE — 99900103 DSU ONLY-NO CHARGE-INITIAL HR (STAT): Performed by: UROLOGY

## 2021-05-18 PROCEDURE — 25500020 PHARM REV CODE 255: Performed by: UROLOGY

## 2021-05-18 PROCEDURE — 37000009 HC ANESTHESIA EA ADD 15 MINS: Performed by: UROLOGY

## 2021-05-18 PROCEDURE — D9220A PRA ANESTHESIA: Mod: CRNA,,, | Performed by: NURSE ANESTHETIST, CERTIFIED REGISTERED

## 2021-05-18 PROCEDURE — 74420 PR  X-RAY RETROGRADE PYELOGRAM: ICD-10-PCS | Mod: 26,,, | Performed by: UROLOGY

## 2021-05-18 PROCEDURE — 63600175 PHARM REV CODE 636 W HCPCS: Performed by: NURSE ANESTHETIST, CERTIFIED REGISTERED

## 2021-05-18 PROCEDURE — C1769 GUIDE WIRE: HCPCS | Performed by: UROLOGY

## 2021-05-18 DEVICE — STENT DBL PGTL URET 6FR 26CM: Type: IMPLANTABLE DEVICE | Site: URETER | Status: FUNCTIONAL

## 2021-05-18 RX ORDER — LIDOCAINE HCL/PF 100 MG/5ML
SYRINGE (ML) INTRAVENOUS
Status: DISCONTINUED | OUTPATIENT
Start: 2021-05-18 | End: 2021-05-18

## 2021-05-18 RX ORDER — FENTANYL CITRATE 50 UG/ML
INJECTION, SOLUTION INTRAMUSCULAR; INTRAVENOUS
Status: DISCONTINUED | OUTPATIENT
Start: 2021-05-18 | End: 2021-05-18

## 2021-05-18 RX ORDER — HYDROMORPHONE HYDROCHLORIDE 2 MG/ML
0.2 INJECTION, SOLUTION INTRAMUSCULAR; INTRAVENOUS; SUBCUTANEOUS EVERY 5 MIN PRN
Status: DISCONTINUED | OUTPATIENT
Start: 2021-05-18 | End: 2021-05-18 | Stop reason: HOSPADM

## 2021-05-18 RX ORDER — MAGNESIUM SULFATE HEPTAHYDRATE 40 MG/ML
2 INJECTION, SOLUTION INTRAVENOUS ONCE
Status: DISCONTINUED | OUTPATIENT
Start: 2021-05-18 | End: 2021-05-19 | Stop reason: HOSPADM

## 2021-05-18 RX ORDER — MUPIROCIN 20 MG/G
OINTMENT TOPICAL 2 TIMES DAILY
Status: DISCONTINUED | OUTPATIENT
Start: 2021-05-18 | End: 2021-05-19 | Stop reason: HOSPADM

## 2021-05-18 RX ORDER — SODIUM CHLORIDE 9 MG/ML
INJECTION, SOLUTION INTRAVENOUS ONCE
Status: CANCELLED | OUTPATIENT
Start: 2021-05-18 | End: 2021-05-18

## 2021-05-18 RX ORDER — HEPARIN SODIUM 5000 [USP'U]/ML
5000 INJECTION, SOLUTION INTRAVENOUS; SUBCUTANEOUS
Status: CANCELLED | OUTPATIENT
Start: 2021-05-18

## 2021-05-18 RX ORDER — SODIUM CHLORIDE 9 MG/ML
INJECTION, SOLUTION INTRAVENOUS
Status: CANCELLED | OUTPATIENT
Start: 2021-05-18

## 2021-05-18 RX ORDER — DEXAMETHASONE SODIUM PHOSPHATE 4 MG/ML
INJECTION, SOLUTION INTRA-ARTICULAR; INTRALESIONAL; INTRAMUSCULAR; INTRAVENOUS; SOFT TISSUE
Status: DISCONTINUED | OUTPATIENT
Start: 2021-05-18 | End: 2021-05-18

## 2021-05-18 RX ORDER — PROPOFOL 10 MG/ML
INJECTION, EMULSION INTRAVENOUS
Status: DISCONTINUED | OUTPATIENT
Start: 2021-05-18 | End: 2021-05-18

## 2021-05-18 RX ORDER — FENTANYL CITRATE 50 UG/ML
25 INJECTION, SOLUTION INTRAMUSCULAR; INTRAVENOUS EVERY 5 MIN PRN
Status: DISCONTINUED | OUTPATIENT
Start: 2021-05-18 | End: 2021-05-18 | Stop reason: HOSPADM

## 2021-05-18 RX ORDER — LORAZEPAM 2 MG/ML
0.25 INJECTION INTRAMUSCULAR ONCE AS NEEDED
Status: DISCONTINUED | OUTPATIENT
Start: 2021-05-18 | End: 2021-05-18 | Stop reason: HOSPADM

## 2021-05-18 RX ORDER — OXYCODONE HYDROCHLORIDE 5 MG/1
5 TABLET ORAL ONCE AS NEEDED
Status: DISCONTINUED | OUTPATIENT
Start: 2021-05-18 | End: 2021-05-18 | Stop reason: HOSPADM

## 2021-05-18 RX ORDER — MIDAZOLAM HYDROCHLORIDE 1 MG/ML
INJECTION INTRAMUSCULAR; INTRAVENOUS
Status: DISCONTINUED | OUTPATIENT
Start: 2021-05-18 | End: 2021-05-18

## 2021-05-18 RX ORDER — SERTRALINE HYDROCHLORIDE 50 MG/1
50 TABLET, FILM COATED ORAL NIGHTLY
Status: DISCONTINUED | OUTPATIENT
Start: 2021-05-18 | End: 2021-05-19 | Stop reason: HOSPADM

## 2021-05-18 RX ORDER — PROCHLORPERAZINE EDISYLATE 5 MG/ML
5 INJECTION INTRAMUSCULAR; INTRAVENOUS EVERY 30 MIN PRN
Status: DISCONTINUED | OUTPATIENT
Start: 2021-05-18 | End: 2021-05-18 | Stop reason: HOSPADM

## 2021-05-18 RX ORDER — ONDANSETRON 2 MG/ML
4 INJECTION INTRAMUSCULAR; INTRAVENOUS ONCE AS NEEDED
Status: DISCONTINUED | OUTPATIENT
Start: 2021-05-18 | End: 2021-05-18 | Stop reason: HOSPADM

## 2021-05-18 RX ORDER — SODIUM CHLORIDE 9 MG/ML
INJECTION, SOLUTION INTRAVENOUS CONTINUOUS
Status: DISCONTINUED | OUTPATIENT
Start: 2021-05-18 | End: 2021-05-18

## 2021-05-18 RX ORDER — PHENYLEPHRINE HYDROCHLORIDE 10 MG/ML
INJECTION INTRAVENOUS
Status: DISCONTINUED | OUTPATIENT
Start: 2021-05-18 | End: 2021-05-18

## 2021-05-18 RX ADMIN — GLYCOPYRROLATE 0.2 MG: 0.2 INJECTION, SOLUTION INTRAMUSCULAR; INTRAVITREAL at 12:05

## 2021-05-18 RX ADMIN — DIPHENHYDRAMINE HYDROCHLORIDE 25 MG: 25 CAPSULE ORAL at 01:05

## 2021-05-18 RX ADMIN — LIDOCAINE HYDROCHLORIDE 75 MG: 20 INJECTION, SOLUTION INTRAVENOUS at 12:05

## 2021-05-18 RX ADMIN — PHENYLEPHRINE HYDROCHLORIDE 100 MCG: 10 INJECTION INTRAVENOUS at 12:05

## 2021-05-18 RX ADMIN — CEFTRIAXONE 1 G: 1 INJECTION, SOLUTION INTRAVENOUS at 05:05

## 2021-05-18 RX ADMIN — PROPOFOL 70 MG: 10 INJECTION, EMULSION INTRAVENOUS at 12:05

## 2021-05-18 RX ADMIN — HYDROMORPHONE HYDROCHLORIDE 1 MG: 2 INJECTION INTRAMUSCULAR; INTRAVENOUS; SUBCUTANEOUS at 11:05

## 2021-05-18 RX ADMIN — HYDROMORPHONE HYDROCHLORIDE 1 MG: 2 INJECTION INTRAMUSCULAR; INTRAVENOUS; SUBCUTANEOUS at 05:05

## 2021-05-18 RX ADMIN — DIPHENHYDRAMINE HYDROCHLORIDE 25 MG: 25 CAPSULE ORAL at 12:05

## 2021-05-18 RX ADMIN — FENTANYL CITRATE 25 MCG: 50 INJECTION, SOLUTION INTRAMUSCULAR; INTRAVENOUS at 12:05

## 2021-05-18 RX ADMIN — SODIUM CHLORIDE: 0.9 INJECTION, SOLUTION INTRAVENOUS at 11:05

## 2021-05-18 RX ADMIN — SERTRALINE HYDROCHLORIDE 50 MG: 50 TABLET ORAL at 12:05

## 2021-05-18 RX ADMIN — SODIUM CHLORIDE 125 ML/HR: 0.9 INJECTION, SOLUTION INTRAVENOUS at 12:05

## 2021-05-18 RX ADMIN — SERTRALINE HYDROCHLORIDE 50 MG: 50 TABLET ORAL at 08:05

## 2021-05-18 RX ADMIN — DEXAMETHASONE SODIUM PHOSPHATE 4 MG: 4 INJECTION, SOLUTION INTRA-ARTICULAR; INTRALESIONAL; INTRAMUSCULAR; INTRAVENOUS; SOFT TISSUE at 12:05

## 2021-05-18 RX ADMIN — DIPHENHYDRAMINE HYDROCHLORIDE 25 MG: 25 CAPSULE ORAL at 08:05

## 2021-05-18 RX ADMIN — ONDANSETRON 4 MG: 2 INJECTION, SOLUTION INTRAMUSCULAR; INTRAVENOUS at 12:05

## 2021-05-18 RX ADMIN — MIDAZOLAM HYDROCHLORIDE 1 MG: 1 INJECTION, SOLUTION INTRAMUSCULAR; INTRAVENOUS at 12:05

## 2021-05-18 RX ADMIN — MUPIROCIN: 20 OINTMENT TOPICAL at 08:05

## 2021-05-19 VITALS
DIASTOLIC BLOOD PRESSURE: 89 MMHG | RESPIRATION RATE: 18 BRPM | TEMPERATURE: 99 F | OXYGEN SATURATION: 96 % | WEIGHT: 147.06 LBS | HEART RATE: 69 BPM | BODY MASS INDEX: 22.29 KG/M2 | SYSTOLIC BLOOD PRESSURE: 131 MMHG | HEIGHT: 68 IN

## 2021-05-19 LAB
ALBUMIN SERPL BCP-MCNC: 2.9 G/DL (ref 3.5–5.2)
ALP SERPL-CCNC: 195 U/L (ref 55–135)
ALT SERPL W/O P-5'-P-CCNC: 7 U/L (ref 10–44)
ANION GAP SERPL CALC-SCNC: 15 MMOL/L (ref 8–16)
AST SERPL-CCNC: 9 U/L (ref 10–40)
BASOPHILS # BLD AUTO: 0.01 K/UL (ref 0–0.2)
BASOPHILS NFR BLD: 0.2 % (ref 0–1.9)
BILIRUB SERPL-MCNC: 0.4 MG/DL (ref 0.1–1)
BUN SERPL-MCNC: 35 MG/DL (ref 8–23)
CALCIUM SERPL-MCNC: 8.5 MG/DL (ref 8.7–10.5)
CHLORIDE SERPL-SCNC: 102 MMOL/L (ref 95–110)
CO2 SERPL-SCNC: 23 MMOL/L (ref 23–29)
CREAT SERPL-MCNC: 5.5 MG/DL (ref 0.5–1.4)
DIFFERENTIAL METHOD: ABNORMAL
EOSINOPHIL # BLD AUTO: 0 K/UL (ref 0–0.5)
EOSINOPHIL NFR BLD: 0 % (ref 0–8)
ERYTHROCYTE [DISTWIDTH] IN BLOOD BY AUTOMATED COUNT: 13.6 % (ref 11.5–14.5)
EST. GFR  (AFRICAN AMERICAN): 8 ML/MIN/1.73 M^2
EST. GFR  (NON AFRICAN AMERICAN): 7 ML/MIN/1.73 M^2
GLUCOSE SERPL-MCNC: 107 MG/DL (ref 70–110)
HCT VFR BLD AUTO: 33.2 % (ref 37–48.5)
HGB BLD-MCNC: 10.5 G/DL (ref 12–16)
IMM GRANULOCYTES # BLD AUTO: 0.01 K/UL (ref 0–0.04)
IMM GRANULOCYTES NFR BLD AUTO: 0.2 % (ref 0–0.5)
LYMPHOCYTES # BLD AUTO: 0.7 K/UL (ref 1–4.8)
LYMPHOCYTES NFR BLD: 14.8 % (ref 18–48)
MAGNESIUM SERPL-MCNC: 1.6 MG/DL (ref 1.6–2.6)
MCH RBC QN AUTO: 33.2 PG (ref 27–31)
MCHC RBC AUTO-ENTMCNC: 31.6 G/DL (ref 32–36)
MCV RBC AUTO: 105 FL (ref 82–98)
MONOCYTES # BLD AUTO: 0.3 K/UL (ref 0.3–1)
MONOCYTES NFR BLD: 6.5 % (ref 4–15)
NEUTROPHILS # BLD AUTO: 3.7 K/UL (ref 1.8–7.7)
NEUTROPHILS NFR BLD: 78.3 % (ref 38–73)
NRBC BLD-RTO: 0 /100 WBC
PLATELET # BLD AUTO: 197 K/UL (ref 150–450)
PMV BLD AUTO: 9.9 FL (ref 9.2–12.9)
POTASSIUM SERPL-SCNC: 3.8 MMOL/L (ref 3.5–5.1)
PROT SERPL-MCNC: 6 G/DL (ref 6–8.4)
RBC # BLD AUTO: 3.16 M/UL (ref 4–5.4)
SODIUM SERPL-SCNC: 140 MMOL/L (ref 136–145)
WBC # BLD AUTO: 4.74 K/UL (ref 3.9–12.7)

## 2021-05-19 PROCEDURE — 36415 COLL VENOUS BLD VENIPUNCTURE: CPT | Performed by: NURSE PRACTITIONER

## 2021-05-19 PROCEDURE — 63600175 PHARM REV CODE 636 W HCPCS: Performed by: UROLOGY

## 2021-05-19 PROCEDURE — 94760 N-INVAS EAR/PLS OXIMETRY 1: CPT

## 2021-05-19 PROCEDURE — 83735 ASSAY OF MAGNESIUM: CPT | Performed by: NURSE PRACTITIONER

## 2021-05-19 PROCEDURE — 25000003 PHARM REV CODE 250: Performed by: NURSE PRACTITIONER

## 2021-05-19 PROCEDURE — 80053 COMPREHEN METABOLIC PANEL: CPT | Performed by: NURSE PRACTITIONER

## 2021-05-19 PROCEDURE — 80100014 HC HEMODIALYSIS 1:1

## 2021-05-19 PROCEDURE — 85025 COMPLETE CBC W/AUTO DIFF WBC: CPT | Performed by: NURSE PRACTITIONER

## 2021-05-19 PROCEDURE — 94761 N-INVAS EAR/PLS OXIMETRY MLT: CPT

## 2021-05-19 RX ORDER — CIPROFLOXACIN 500 MG/1
500 TABLET ORAL DAILY
Qty: 12 TABLET | Refills: 0 | Status: SHIPPED | OUTPATIENT
Start: 2021-05-20 | End: 2021-06-01

## 2021-05-19 RX ADMIN — LEVOTHYROXINE SODIUM 88 MCG: 0.09 TABLET ORAL at 05:05

## 2021-05-19 RX ADMIN — DILTIAZEM HYDROCHLORIDE 120 MG: 120 CAPSULE, COATED, EXTENDED RELEASE ORAL at 12:05

## 2021-05-19 RX ADMIN — EPOETIN ALFA-EPBX 3200 UNITS: 10000 INJECTION, SOLUTION INTRAVENOUS; SUBCUTANEOUS at 10:05

## 2021-05-19 RX ADMIN — SODIUM CHLORIDE: 0.9 INJECTION, SOLUTION INTRAVENOUS at 05:05

## 2021-05-20 ENCOUNTER — PATIENT OUTREACH (OUTPATIENT)
Dept: FAMILY MEDICINE | Facility: CLINIC | Age: 71
End: 2021-05-20

## 2021-05-20 ENCOUNTER — TELEPHONE (OUTPATIENT)
Dept: MEDSURG UNIT | Facility: HOSPITAL | Age: 71
End: 2021-05-20

## 2021-05-21 ENCOUNTER — PATIENT MESSAGE (OUTPATIENT)
Dept: ADMINISTRATIVE | Facility: CLINIC | Age: 71
End: 2021-05-21

## 2021-05-21 ENCOUNTER — PATIENT OUTREACH (OUTPATIENT)
Dept: ADMINISTRATIVE | Facility: CLINIC | Age: 71
End: 2021-05-21

## 2021-05-23 LAB
BACTERIA BLD CULT: NORMAL
BACTERIA BLD CULT: NORMAL

## 2021-05-24 ENCOUNTER — OFFICE VISIT (OUTPATIENT)
Dept: FAMILY MEDICINE | Facility: CLINIC | Age: 71
End: 2021-05-24
Payer: MEDICARE

## 2021-05-24 VITALS
HEART RATE: 76 BPM | WEIGHT: 139 LBS | DIASTOLIC BLOOD PRESSURE: 60 MMHG | RESPIRATION RATE: 14 BRPM | TEMPERATURE: 98 F | SYSTOLIC BLOOD PRESSURE: 112 MMHG | OXYGEN SATURATION: 99 % | HEIGHT: 68 IN | BODY MASS INDEX: 21.07 KG/M2

## 2021-05-24 DIAGNOSIS — E78.5 HYPERLIPIDEMIA, UNSPECIFIED HYPERLIPIDEMIA TYPE: ICD-10-CM

## 2021-05-24 DIAGNOSIS — E11.40 CONTROLLED TYPE 2 DIABETES MELLITUS WITH NEUROPATHY: ICD-10-CM

## 2021-05-24 DIAGNOSIS — N20.0 RENAL STONES: ICD-10-CM

## 2021-05-24 DIAGNOSIS — Z01.00 ENCOUNTER FOR VISION SCREENING: ICD-10-CM

## 2021-05-24 DIAGNOSIS — N18.6 END-STAGE RENAL DISEASE: ICD-10-CM

## 2021-05-24 DIAGNOSIS — N12 PYELONEPHRITIS OF RIGHT KIDNEY: ICD-10-CM

## 2021-05-24 DIAGNOSIS — M62.838 MUSCLE SPASMS OF BOTH LOWER EXTREMITIES: ICD-10-CM

## 2021-05-24 DIAGNOSIS — R74.8 ELEVATED ALKALINE PHOSPHATASE MEASUREMENT: Primary | ICD-10-CM

## 2021-05-24 DIAGNOSIS — Z12.31 ENCOUNTER FOR SCREENING MAMMOGRAM FOR BREAST CANCER: ICD-10-CM

## 2021-05-24 LAB
COMPN STONE: NORMAL
SPECIMEN SOURCE: NORMAL
STONE ANALYSIS IR-IMP: NORMAL

## 2021-05-24 PROCEDURE — 99495 TRANSJ CARE MGMT MOD F2F 14D: CPT | Mod: S$GLB,,, | Performed by: INTERNAL MEDICINE

## 2021-05-24 PROCEDURE — 3008F PR BODY MASS INDEX (BMI) DOCUMENTED: ICD-10-PCS | Mod: S$GLB,,, | Performed by: INTERNAL MEDICINE

## 2021-05-24 PROCEDURE — 3288F PR FALLS RISK ASSESSMENT DOCUMENTED: ICD-10-PCS | Mod: S$GLB,,, | Performed by: INTERNAL MEDICINE

## 2021-05-24 PROCEDURE — 99495 TCM SERVICES (MODERATE COMPLEXITY): ICD-10-PCS | Mod: S$GLB,,, | Performed by: INTERNAL MEDICINE

## 2021-05-24 PROCEDURE — 1125F AMNT PAIN NOTED PAIN PRSNT: CPT | Mod: S$GLB,,, | Performed by: INTERNAL MEDICINE

## 2021-05-24 PROCEDURE — 1101F PR PT FALLS ASSESS DOC 0-1 FALLS W/OUT INJ PAST YR: ICD-10-PCS | Mod: S$GLB,,, | Performed by: INTERNAL MEDICINE

## 2021-05-24 PROCEDURE — 3288F FALL RISK ASSESSMENT DOCD: CPT | Mod: S$GLB,,, | Performed by: INTERNAL MEDICINE

## 2021-05-24 PROCEDURE — 1101F PT FALLS ASSESS-DOCD LE1/YR: CPT | Mod: S$GLB,,, | Performed by: INTERNAL MEDICINE

## 2021-05-24 PROCEDURE — 3008F BODY MASS INDEX DOCD: CPT | Mod: S$GLB,,, | Performed by: INTERNAL MEDICINE

## 2021-05-24 PROCEDURE — 1125F PR PAIN SEVERITY QUANTIFIED, PAIN PRESENT: ICD-10-PCS | Mod: S$GLB,,, | Performed by: INTERNAL MEDICINE

## 2021-05-24 RX ORDER — CYCLOBENZAPRINE HCL 10 MG
10 TABLET ORAL 3 TIMES DAILY PRN
Qty: 30 TABLET | Refills: 1 | Status: SHIPPED | OUTPATIENT
Start: 2021-05-24 | End: 2022-01-01 | Stop reason: SDUPTHER

## 2021-05-24 RX ORDER — TRAMADOL HYDROCHLORIDE 50 MG/1
50 TABLET ORAL EVERY 8 HOURS PRN
Qty: 40 TABLET | Refills: 0 | Status: ON HOLD | OUTPATIENT
Start: 2021-05-24 | End: 2022-01-01 | Stop reason: HOSPADM

## 2021-05-24 RX ORDER — FERRIC CITRATE 210 MG/1
2 TABLET, COATED ORAL
COMMUNITY
Start: 2021-05-01 | End: 2021-05-24 | Stop reason: SDUPTHER

## 2021-05-24 RX ORDER — CHOLESTYRAMINE LIGHT 4 G/5.7G
1 POWDER, FOR SUSPENSION ORAL 2 TIMES DAILY
COMMUNITY
Start: 2020-12-21 | End: 2022-01-01

## 2021-05-25 ENCOUNTER — LAB VISIT (OUTPATIENT)
Dept: LAB | Facility: HOSPITAL | Age: 71
End: 2021-05-25
Attending: INTERNAL MEDICINE
Payer: MEDICARE

## 2021-05-25 DIAGNOSIS — E11.40 CONTROLLED TYPE 2 DIABETES MELLITUS WITH NEUROPATHY: ICD-10-CM

## 2021-05-25 DIAGNOSIS — R74.8 ELEVATED ALKALINE PHOSPHATASE MEASUREMENT: ICD-10-CM

## 2021-05-25 DIAGNOSIS — E78.5 HYPERLIPIDEMIA, UNSPECIFIED HYPERLIPIDEMIA TYPE: ICD-10-CM

## 2021-05-25 LAB
ALBUMIN SERPL BCP-MCNC: 3.7 G/DL (ref 3.5–5.2)
ALP SERPL-CCNC: 119 U/L (ref 55–135)
ALT SERPL W/O P-5'-P-CCNC: 12 U/L (ref 10–44)
AST SERPL-CCNC: 15 U/L (ref 10–40)
BILIRUB DIRECT SERPL-MCNC: 0.2 MG/DL (ref 0.1–0.3)
BILIRUB SERPL-MCNC: 0.9 MG/DL (ref 0.1–1)
CHOLEST SERPL-MCNC: 95 MG/DL (ref 120–199)
CHOLEST/HDLC SERPL: 1.7 {RATIO} (ref 2–5)
ESTIMATED AVG GLUCOSE: 85 MG/DL (ref 68–131)
HBA1C MFR BLD: 4.6 % (ref 4.5–6.2)
HDLC SERPL-MCNC: 57 MG/DL (ref 40–75)
HDLC SERPL: 60 % (ref 20–50)
LDLC SERPL CALC-MCNC: 23.4 MG/DL (ref 63–159)
NONHDLC SERPL-MCNC: 38 MG/DL
PROT SERPL-MCNC: 6.7 G/DL (ref 6–8.4)
TRIGL SERPL-MCNC: 73 MG/DL (ref 30–150)

## 2021-05-25 PROCEDURE — 80061 LIPID PANEL: CPT | Performed by: INTERNAL MEDICINE

## 2021-05-25 PROCEDURE — 36415 COLL VENOUS BLD VENIPUNCTURE: CPT | Performed by: INTERNAL MEDICINE

## 2021-05-25 PROCEDURE — 83036 HEMOGLOBIN GLYCOSYLATED A1C: CPT | Performed by: INTERNAL MEDICINE

## 2021-05-25 PROCEDURE — 80076 HEPATIC FUNCTION PANEL: CPT | Performed by: INTERNAL MEDICINE

## 2021-05-27 ENCOUNTER — TELEPHONE (OUTPATIENT)
Dept: FAMILY MEDICINE | Facility: CLINIC | Age: 71
End: 2021-05-27

## 2021-06-01 ENCOUNTER — HOSPITAL ENCOUNTER (OUTPATIENT)
Dept: RADIOLOGY | Facility: HOSPITAL | Age: 71
Discharge: HOME | End: 2021-06-01
Attending: INTERNAL MEDICINE
Payer: MEDICARE

## 2021-06-01 DIAGNOSIS — R74.8 ELEVATED ALKALINE PHOSPHATASE MEASUREMENT: ICD-10-CM

## 2021-06-01 DIAGNOSIS — Z12.31 ENCOUNTER FOR SCREENING MAMMOGRAM FOR BREAST CANCER: ICD-10-CM

## 2021-06-01 PROCEDURE — 76705 ECHO EXAM OF ABDOMEN: CPT | Mod: TC,PO

## 2021-06-01 PROCEDURE — 77067 SCR MAMMO BI INCL CAD: CPT | Mod: TC,PO

## 2021-07-06 DIAGNOSIS — R11.2 NAUSEA AND VOMITING, INTRACTABILITY OF VOMITING NOT SPECIFIED, UNSPECIFIED VOMITING TYPE: ICD-10-CM

## 2021-07-06 RX ORDER — PROMETHAZINE HYDROCHLORIDE 25 MG/1
25 TABLET ORAL 2 TIMES DAILY PRN
Qty: 60 TABLET | Refills: 0 | Status: SHIPPED | OUTPATIENT
Start: 2021-07-06 | End: 2021-08-03

## 2021-09-15 ENCOUNTER — HOSPITAL ENCOUNTER (INPATIENT)
Facility: HOSPITAL | Age: 71
LOS: 1 days | Discharge: HOME | DRG: 602 | End: 2021-09-16
Attending: EMERGENCY MEDICINE | Admitting: INTERNAL MEDICINE
Payer: MEDICARE

## 2021-09-15 DIAGNOSIS — E87.6 HYPOKALEMIA: Primary | ICD-10-CM

## 2021-09-15 DIAGNOSIS — D64.9 SYMPTOMATIC ANEMIA: ICD-10-CM

## 2021-09-15 DIAGNOSIS — L03.315 CELLULITIS OF PERINEUM: ICD-10-CM

## 2021-09-15 DIAGNOSIS — D64.9 ACUTE ON CHRONIC ANEMIA: ICD-10-CM

## 2021-09-15 DIAGNOSIS — L03.90 RECURRENT CELLULITIS: ICD-10-CM

## 2021-09-15 DIAGNOSIS — E87.8 ELECTROLYTE ABNORMALITY: ICD-10-CM

## 2021-09-15 LAB
ABO + RH BLD: NORMAL
ALBUMIN SERPL BCP-MCNC: 3.2 G/DL (ref 3.5–5.2)
ALP SERPL-CCNC: 99 U/L (ref 55–135)
ALT SERPL W/O P-5'-P-CCNC: 8 U/L (ref 10–44)
ANION GAP SERPL CALC-SCNC: 14 MMOL/L (ref 8–16)
APTT PPP: 30.6 SEC (ref 25.6–35.8)
AST SERPL-CCNC: 15 U/L (ref 10–40)
BASOPHILS # BLD AUTO: 0.01 K/UL (ref 0–0.2)
BASOPHILS NFR BLD: 0.1 % (ref 0–1.9)
BILIRUB SERPL-MCNC: 0.7 MG/DL (ref 0.1–1)
BLD GP AB SCN CELLS X3 SERPL QL: NORMAL
BLD PROD TYP BPU: NORMAL
BLOOD UNIT EXPIRATION DATE: NORMAL
BLOOD UNIT TYPE CODE: 6200
BLOOD UNIT TYPE: NORMAL
BUN SERPL-MCNC: 13 MG/DL (ref 8–23)
CALCIUM SERPL-MCNC: 8.2 MG/DL (ref 8.7–10.5)
CHLORIDE SERPL-SCNC: 99 MMOL/L (ref 95–110)
CO2 SERPL-SCNC: 28 MMOL/L (ref 23–29)
CODING SYSTEM: NORMAL
CREAT SERPL-MCNC: 2.6 MG/DL (ref 0.5–1.4)
DIFFERENTIAL METHOD: ABNORMAL
DISPENSE STATUS: NORMAL
EOSINOPHIL # BLD AUTO: 0.1 K/UL (ref 0–0.5)
EOSINOPHIL NFR BLD: 1.6 % (ref 0–8)
ERYTHROCYTE [DISTWIDTH] IN BLOOD BY AUTOMATED COUNT: 16.1 % (ref 11.5–14.5)
EST. GFR  (AFRICAN AMERICAN): 20.6 ML/MIN/1.73 M^2
EST. GFR  (NON AFRICAN AMERICAN): 17.9 ML/MIN/1.73 M^2
GLUCOSE SERPL-MCNC: 123 MG/DL (ref 70–110)
HCT VFR BLD AUTO: 26 % (ref 37–48.5)
HGB BLD-MCNC: 8.4 G/DL (ref 12–16)
IMM GRANULOCYTES # BLD AUTO: 0.02 K/UL (ref 0–0.04)
IMM GRANULOCYTES NFR BLD AUTO: 0.3 % (ref 0–0.5)
INR PPP: 1.4
LYMPHOCYTES # BLD AUTO: 0.8 K/UL (ref 1–4.8)
LYMPHOCYTES NFR BLD: 12.5 % (ref 18–48)
MAGNESIUM SERPL-MCNC: 1.4 MG/DL (ref 1.6–2.6)
MCH RBC QN AUTO: 36.4 PG (ref 27–31)
MCHC RBC AUTO-ENTMCNC: 32.3 G/DL (ref 32–36)
MCV RBC AUTO: 113 FL (ref 82–98)
MONOCYTES # BLD AUTO: 0.5 K/UL (ref 0.3–1)
MONOCYTES NFR BLD: 7.5 % (ref 4–15)
NEUTROPHILS # BLD AUTO: 5.2 K/UL (ref 1.8–7.7)
NEUTROPHILS NFR BLD: 78 % (ref 38–73)
NRBC BLD-RTO: 0 /100 WBC
NUM UNITS TRANS PACKED RBC: NORMAL
PLATELET # BLD AUTO: 179 K/UL (ref 150–450)
PMV BLD AUTO: 10 FL (ref 9.2–12.9)
POTASSIUM SERPL-SCNC: 3 MMOL/L (ref 3.5–5.1)
PROT SERPL-MCNC: 6.2 G/DL (ref 6–8.4)
PROTHROMBIN TIME: 16.2 SEC (ref 11.8–14.3)
RBC # BLD AUTO: 2.31 M/UL (ref 4–5.4)
SARS-COV-2 RDRP RESP QL NAA+PROBE: NEGATIVE
SODIUM SERPL-SCNC: 141 MMOL/L (ref 136–145)
TROPONIN I SERPL DL<=0.01 NG/ML-MCNC: <0.03 NG/ML
WBC # BLD AUTO: 6.71 K/UL (ref 3.9–12.7)

## 2021-09-15 PROCEDURE — 25000003 PHARM REV CODE 250: Performed by: NURSE PRACTITIONER

## 2021-09-15 PROCEDURE — 84484 ASSAY OF TROPONIN QUANT: CPT | Performed by: NURSE PRACTITIONER

## 2021-09-15 PROCEDURE — 12000002 HC ACUTE/MED SURGE SEMI-PRIVATE ROOM

## 2021-09-15 PROCEDURE — 80053 COMPREHEN METABOLIC PANEL: CPT | Performed by: NURSE PRACTITIONER

## 2021-09-15 PROCEDURE — 85730 THROMBOPLASTIN TIME PARTIAL: CPT | Performed by: NURSE PRACTITIONER

## 2021-09-15 PROCEDURE — U0002 COVID-19 LAB TEST NON-CDC: HCPCS | Performed by: NURSE PRACTITIONER

## 2021-09-15 PROCEDURE — 85025 COMPLETE CBC W/AUTO DIFF WBC: CPT | Performed by: NURSE PRACTITIONER

## 2021-09-15 PROCEDURE — 86920 COMPATIBILITY TEST SPIN: CPT | Performed by: NURSE PRACTITIONER

## 2021-09-15 PROCEDURE — 93010 ELECTROCARDIOGRAM REPORT: CPT | Mod: ,,, | Performed by: SPECIALIST

## 2021-09-15 PROCEDURE — 96374 THER/PROPH/DIAG INJ IV PUSH: CPT

## 2021-09-15 PROCEDURE — 83735 ASSAY OF MAGNESIUM: CPT | Performed by: NURSE PRACTITIONER

## 2021-09-15 PROCEDURE — 63600175 PHARM REV CODE 636 W HCPCS: Performed by: NURSE PRACTITIONER

## 2021-09-15 PROCEDURE — 93005 ELECTROCARDIOGRAM TRACING: CPT | Performed by: SPECIALIST

## 2021-09-15 PROCEDURE — 86900 BLOOD TYPING SEROLOGIC ABO: CPT | Performed by: NURSE PRACTITIONER

## 2021-09-15 PROCEDURE — 36430 TRANSFUSION BLD/BLD COMPNT: CPT

## 2021-09-15 PROCEDURE — 99285 EMERGENCY DEPT VISIT HI MDM: CPT | Mod: 25

## 2021-09-15 PROCEDURE — P9016 RBC LEUKOCYTES REDUCED: HCPCS | Performed by: NURSE PRACTITIONER

## 2021-09-15 PROCEDURE — 93010 EKG 12-LEAD: ICD-10-PCS | Mod: ,,, | Performed by: SPECIALIST

## 2021-09-15 PROCEDURE — 85610 PROTHROMBIN TIME: CPT | Performed by: NURSE PRACTITIONER

## 2021-09-15 RX ORDER — CYCLOBENZAPRINE HCL 10 MG
10 TABLET ORAL 3 TIMES DAILY PRN
Status: DISCONTINUED | OUTPATIENT
Start: 2021-09-15 | End: 2021-09-16 | Stop reason: HOSPADM

## 2021-09-15 RX ORDER — ACETAMINOPHEN 325 MG/1
650 TABLET ORAL EVERY 8 HOURS PRN
Status: DISCONTINUED | OUTPATIENT
Start: 2021-09-15 | End: 2021-09-16 | Stop reason: HOSPADM

## 2021-09-15 RX ORDER — DILTIAZEM HYDROCHLORIDE 120 MG/1
120 CAPSULE, COATED, EXTENDED RELEASE ORAL DAILY
Status: DISCONTINUED | OUTPATIENT
Start: 2021-09-16 | End: 2021-09-16 | Stop reason: HOSPADM

## 2021-09-15 RX ORDER — TRAMADOL HYDROCHLORIDE 50 MG/1
50 TABLET ORAL EVERY 8 HOURS PRN
Status: DISCONTINUED | OUTPATIENT
Start: 2021-09-15 | End: 2021-09-15

## 2021-09-15 RX ORDER — HYDROCODONE BITARTRATE AND ACETAMINOPHEN 7.5; 325 MG/1; MG/1
1 TABLET ORAL EVERY 6 HOURS PRN
Status: DISCONTINUED | OUTPATIENT
Start: 2021-09-15 | End: 2021-09-16 | Stop reason: HOSPADM

## 2021-09-15 RX ORDER — HYDROCODONE BITARTRATE AND ACETAMINOPHEN 500; 5 MG/1; MG/1
TABLET ORAL
Status: DISCONTINUED | OUTPATIENT
Start: 2021-09-15 | End: 2021-09-16 | Stop reason: HOSPADM

## 2021-09-15 RX ORDER — POTASSIUM CHLORIDE 7.45 MG/ML
10 INJECTION INTRAVENOUS ONCE
Status: COMPLETED | OUTPATIENT
Start: 2021-09-15 | End: 2021-09-15

## 2021-09-15 RX ORDER — LEVOTHYROXINE SODIUM 88 UG/1
88 TABLET ORAL
Status: DISCONTINUED | OUTPATIENT
Start: 2021-09-16 | End: 2021-09-16 | Stop reason: HOSPADM

## 2021-09-15 RX ORDER — TALC
6 POWDER (GRAM) TOPICAL NIGHTLY PRN
Status: DISCONTINUED | OUTPATIENT
Start: 2021-09-15 | End: 2021-09-16 | Stop reason: HOSPADM

## 2021-09-15 RX ORDER — SODIUM CHLORIDE 0.9 % (FLUSH) 0.9 %
10 SYRINGE (ML) INJECTION
Status: DISCONTINUED | OUTPATIENT
Start: 2021-09-15 | End: 2021-09-16 | Stop reason: HOSPADM

## 2021-09-15 RX ORDER — PROMETHAZINE HYDROCHLORIDE 25 MG/1
25 TABLET ORAL 2 TIMES DAILY PRN
Status: DISCONTINUED | OUTPATIENT
Start: 2021-09-15 | End: 2021-09-16 | Stop reason: HOSPADM

## 2021-09-15 RX ORDER — CLINDAMYCIN PHOSPHATE 600 MG/50ML
600 INJECTION, SOLUTION INTRAVENOUS
Status: DISCONTINUED | OUTPATIENT
Start: 2021-09-15 | End: 2021-09-16 | Stop reason: HOSPADM

## 2021-09-15 RX ADMIN — POTASSIUM CHLORIDE 10 MEQ: 7.46 INJECTION, SOLUTION INTRAVENOUS at 11:09

## 2021-09-15 RX ADMIN — CEFTRIAXONE 1 G: 1 INJECTION, SOLUTION INTRAVENOUS at 05:09

## 2021-09-15 RX ADMIN — HYDROCODONE BITARTRATE AND ACETAMINOPHEN 1 TABLET: 7.5; 325 TABLET ORAL at 05:09

## 2021-09-15 RX ADMIN — CLINDAMYCIN IN 5 PERCENT DEXTROSE 600 MG: 12 INJECTION, SOLUTION INTRAVENOUS at 11:09

## 2021-09-15 RX ADMIN — POTASSIUM BICARBONATE 50 MEQ: 25 TABLET, EFFERVESCENT ORAL at 04:09

## 2021-09-15 RX ADMIN — HYDROCODONE BITARTRATE AND ACETAMINOPHEN 1 TABLET: 7.5; 325 TABLET ORAL at 11:09

## 2021-09-16 VITALS
SYSTOLIC BLOOD PRESSURE: 141 MMHG | BODY MASS INDEX: 21.07 KG/M2 | DIASTOLIC BLOOD PRESSURE: 80 MMHG | HEIGHT: 68 IN | OXYGEN SATURATION: 96 % | WEIGHT: 139 LBS | RESPIRATION RATE: 17 BRPM | HEART RATE: 67 BPM | TEMPERATURE: 98 F

## 2021-09-16 PROBLEM — L03.90 CELLULITIS: Status: RESOLVED | Noted: 2020-02-10 | Resolved: 2021-09-16

## 2021-09-16 PROBLEM — E87.6 HYPOKALEMIA: Status: RESOLVED | Noted: 2021-03-15 | Resolved: 2021-09-16

## 2021-09-16 PROBLEM — L03.90 RECURRENT CELLULITIS: Status: RESOLVED | Noted: 2021-09-15 | Resolved: 2021-09-16

## 2021-09-16 LAB
ALBUMIN SERPL BCP-MCNC: 3 G/DL (ref 3.5–5.2)
ALP SERPL-CCNC: 96 U/L (ref 55–135)
ALT SERPL W/O P-5'-P-CCNC: 9 U/L (ref 10–44)
ANION GAP SERPL CALC-SCNC: 11 MMOL/L (ref 8–16)
AST SERPL-CCNC: 11 U/L (ref 10–40)
BASOPHILS # BLD AUTO: 0.02 K/UL (ref 0–0.2)
BASOPHILS NFR BLD: 0.3 % (ref 0–1.9)
BILIRUB SERPL-MCNC: 0.7 MG/DL (ref 0.1–1)
BUN SERPL-MCNC: 22 MG/DL (ref 8–23)
CALCIUM SERPL-MCNC: 8 MG/DL (ref 8.7–10.5)
CHLORIDE SERPL-SCNC: 100 MMOL/L (ref 95–110)
CO2 SERPL-SCNC: 30 MMOL/L (ref 23–29)
CREAT SERPL-MCNC: 3.6 MG/DL (ref 0.5–1.4)
DIFFERENTIAL METHOD: ABNORMAL
EOSINOPHIL # BLD AUTO: 0.1 K/UL (ref 0–0.5)
EOSINOPHIL NFR BLD: 2 % (ref 0–8)
ERYTHROCYTE [DISTWIDTH] IN BLOOD BY AUTOMATED COUNT: 18.1 % (ref 11.5–14.5)
EST. GFR  (AFRICAN AMERICAN): 13.9 ML/MIN/1.73 M^2
EST. GFR  (NON AFRICAN AMERICAN): 12.1 ML/MIN/1.73 M^2
GLUCOSE SERPL-MCNC: 78 MG/DL (ref 70–110)
HCT VFR BLD AUTO: 28.2 % (ref 37–48.5)
HGB BLD-MCNC: 9 G/DL (ref 12–16)
IMM GRANULOCYTES # BLD AUTO: 0.03 K/UL (ref 0–0.04)
IMM GRANULOCYTES NFR BLD AUTO: 0.5 % (ref 0–0.5)
LYMPHOCYTES # BLD AUTO: 1.1 K/UL (ref 1–4.8)
LYMPHOCYTES NFR BLD: 17.2 % (ref 18–48)
MCH RBC QN AUTO: 36.3 PG (ref 27–31)
MCHC RBC AUTO-ENTMCNC: 31.9 G/DL (ref 32–36)
MCV RBC AUTO: 114 FL (ref 82–98)
MONOCYTES # BLD AUTO: 0.5 K/UL (ref 0.3–1)
MONOCYTES NFR BLD: 8.5 % (ref 4–15)
NEUTROPHILS # BLD AUTO: 4.4 K/UL (ref 1.8–7.7)
NEUTROPHILS NFR BLD: 71.5 % (ref 38–73)
NRBC BLD-RTO: 0 /100 WBC
PLATELET # BLD AUTO: 158 K/UL (ref 150–450)
PMV BLD AUTO: 9.9 FL (ref 9.2–12.9)
POTASSIUM SERPL-SCNC: 3.8 MMOL/L (ref 3.5–5.1)
PROT SERPL-MCNC: 5.8 G/DL (ref 6–8.4)
RBC # BLD AUTO: 2.48 M/UL (ref 4–5.4)
SODIUM SERPL-SCNC: 141 MMOL/L (ref 136–145)
TROPONIN I SERPL DL<=0.01 NG/ML-MCNC: <0.03 NG/ML
WBC # BLD AUTO: 6.09 K/UL (ref 3.9–12.7)

## 2021-09-16 PROCEDURE — 25000003 PHARM REV CODE 250: Performed by: NURSE PRACTITIONER

## 2021-09-16 PROCEDURE — 80053 COMPREHEN METABOLIC PANEL: CPT | Performed by: NURSE PRACTITIONER

## 2021-09-16 PROCEDURE — 25000003 PHARM REV CODE 250: Performed by: INTERNAL MEDICINE

## 2021-09-16 PROCEDURE — 36415 COLL VENOUS BLD VENIPUNCTURE: CPT | Performed by: NURSE PRACTITIONER

## 2021-09-16 PROCEDURE — 84484 ASSAY OF TROPONIN QUANT: CPT | Performed by: NURSE PRACTITIONER

## 2021-09-16 PROCEDURE — 85025 COMPLETE CBC W/AUTO DIFF WBC: CPT | Performed by: NURSE PRACTITIONER

## 2021-09-16 RX ORDER — DIPHENHYDRAMINE HCL 25 MG
50 CAPSULE ORAL EVERY 6 HOURS PRN
Status: DISCONTINUED | OUTPATIENT
Start: 2021-09-16 | End: 2021-09-16 | Stop reason: HOSPADM

## 2021-09-16 RX ORDER — CEPHALEXIN 250 MG/1
250 CAPSULE ORAL EVERY 12 HOURS
Qty: 14 CAPSULE | Refills: 0 | Status: SHIPPED | OUTPATIENT
Start: 2021-09-16 | End: 2021-09-23

## 2021-09-16 RX ORDER — DOXYCYCLINE 100 MG/1
100 CAPSULE ORAL EVERY 12 HOURS
Qty: 14 CAPSULE | Refills: 0 | Status: SHIPPED | OUTPATIENT
Start: 2021-09-16 | End: 2021-09-23

## 2021-09-16 RX ADMIN — HYDROCODONE BITARTRATE AND ACETAMINOPHEN 1 TABLET: 7.5; 325 TABLET ORAL at 06:09

## 2021-09-16 RX ADMIN — LEVOTHYROXINE SODIUM 88 MCG: 88 TABLET ORAL at 06:09

## 2021-09-16 RX ADMIN — DILTIAZEM HYDROCHLORIDE 120 MG: 120 CAPSULE, COATED, EXTENDED RELEASE ORAL at 08:09

## 2021-09-16 RX ADMIN — DIPHENHYDRAMINE HYDROCHLORIDE 50 MG: 25 CAPSULE ORAL at 08:09

## 2021-09-16 RX ADMIN — CLINDAMYCIN IN 5 PERCENT DEXTROSE 600 MG: 12 INJECTION, SOLUTION INTRAVENOUS at 08:09

## 2021-09-17 ENCOUNTER — TELEPHONE (OUTPATIENT)
Dept: FAMILY MEDICINE | Facility: CLINIC | Age: 71
End: 2021-09-17

## 2022-01-01 ENCOUNTER — TELEPHONE (OUTPATIENT)
Dept: FAMILY MEDICINE | Facility: CLINIC | Age: 72
End: 2022-01-01

## 2022-01-01 ENCOUNTER — HOSPITAL ENCOUNTER (EMERGENCY)
Facility: HOSPITAL | Age: 72
Discharge: HOME | End: 2022-10-29
Attending: EMERGENCY MEDICINE
Payer: OTHER GOVERNMENT

## 2022-01-01 ENCOUNTER — HOSPITAL ENCOUNTER (EMERGENCY)
Facility: HOSPITAL | Age: 72
Discharge: HOME | End: 2022-08-27
Attending: EMERGENCY MEDICINE
Payer: OTHER GOVERNMENT

## 2022-01-01 ENCOUNTER — PATIENT MESSAGE (OUTPATIENT)
Dept: FAMILY MEDICINE | Facility: CLINIC | Age: 72
End: 2022-01-01

## 2022-01-01 ENCOUNTER — OFFICE VISIT (OUTPATIENT)
Dept: UROLOGY | Facility: CLINIC | Age: 72
End: 2022-01-01
Payer: MEDICARE

## 2022-01-01 ENCOUNTER — HOSPITAL ENCOUNTER (INPATIENT)
Facility: HOSPITAL | Age: 72
LOS: 7 days | Discharge: HOME HEALTH | DRG: 329 | End: 2022-09-14
Attending: EMERGENCY MEDICINE | Admitting: INTERNAL MEDICINE
Payer: MEDICARE

## 2022-01-01 ENCOUNTER — OFFICE VISIT (OUTPATIENT)
Dept: INFECTIOUS DISEASES | Facility: CLINIC | Age: 72
End: 2022-01-01
Payer: MEDICARE

## 2022-01-01 ENCOUNTER — ANESTHESIA EVENT (OUTPATIENT)
Dept: SURGERY | Facility: HOSPITAL | Age: 72
DRG: 853 | End: 2022-01-01
Payer: OTHER GOVERNMENT

## 2022-01-01 ENCOUNTER — ANESTHESIA (OUTPATIENT)
Dept: SURGERY | Facility: HOSPITAL | Age: 72
DRG: 981 | End: 2022-01-01
Payer: OTHER GOVERNMENT

## 2022-01-01 ENCOUNTER — HOSPITAL ENCOUNTER (INPATIENT)
Facility: HOSPITAL | Age: 72
LOS: 10 days | Discharge: HOME | DRG: 981 | End: 2022-01-26
Attending: EMERGENCY MEDICINE | Admitting: INTERNAL MEDICINE
Payer: OTHER GOVERNMENT

## 2022-01-01 ENCOUNTER — PATIENT MESSAGE (OUTPATIENT)
Dept: FAMILY MEDICINE | Facility: CLINIC | Age: 72
End: 2022-01-01
Payer: OTHER GOVERNMENT

## 2022-01-01 ENCOUNTER — ANESTHESIA EVENT (OUTPATIENT)
Dept: SURGERY | Facility: HOSPITAL | Age: 72
DRG: 329 | End: 2022-01-01
Payer: OTHER GOVERNMENT

## 2022-01-01 ENCOUNTER — HOSPITAL ENCOUNTER (INPATIENT)
Facility: HOSPITAL | Age: 72
LOS: 4 days | Discharge: HOME | DRG: 659 | End: 2022-06-25
Attending: EMERGENCY MEDICINE | Admitting: INTERNAL MEDICINE
Payer: MEDICARE

## 2022-01-01 ENCOUNTER — ANESTHESIA EVENT (OUTPATIENT)
Dept: SURGERY | Facility: HOSPITAL | Age: 72
End: 2022-01-01
Payer: MEDICARE

## 2022-01-01 ENCOUNTER — OFFICE VISIT (OUTPATIENT)
Dept: FAMILY MEDICINE | Facility: CLINIC | Age: 72
End: 2022-01-01
Payer: MEDICARE

## 2022-01-01 ENCOUNTER — ANESTHESIA (OUTPATIENT)
Dept: SURGERY | Facility: HOSPITAL | Age: 72
End: 2022-01-01
Payer: MEDICARE

## 2022-01-01 ENCOUNTER — HOSPITAL ENCOUNTER (OUTPATIENT)
Facility: HOSPITAL | Age: 72
Discharge: HOME HEALTH | End: 2022-07-01
Attending: EMERGENCY MEDICINE | Admitting: INTERNAL MEDICINE
Payer: MEDICARE

## 2022-01-01 ENCOUNTER — HOSPITAL ENCOUNTER (EMERGENCY)
Facility: HOSPITAL | Age: 72
Discharge: HOME | End: 2022-07-20
Attending: EMERGENCY MEDICINE
Payer: OTHER GOVERNMENT

## 2022-01-01 ENCOUNTER — HOSPITAL ENCOUNTER (INPATIENT)
Facility: HOSPITAL | Age: 72
LOS: 5 days | Discharge: HOME | DRG: 757 | End: 2022-04-30
Attending: EMERGENCY MEDICINE | Admitting: INTERNAL MEDICINE
Payer: OTHER GOVERNMENT

## 2022-01-01 ENCOUNTER — HOSPITAL ENCOUNTER (INPATIENT)
Facility: HOSPITAL | Age: 72
LOS: 11 days | Discharge: NURSING, REHAB OR LONG-TERM CARE | DRG: 312 | End: 2022-09-27
Attending: EMERGENCY MEDICINE | Admitting: INTERNAL MEDICINE
Payer: OTHER GOVERNMENT

## 2022-01-01 ENCOUNTER — INFUSION (OUTPATIENT)
Dept: INFUSION THERAPY | Facility: HOSPITAL | Age: 72
End: 2022-01-01
Attending: INTERNAL MEDICINE
Payer: MEDICARE

## 2022-01-01 ENCOUNTER — CLINICAL SUPPORT (OUTPATIENT)
Dept: CARDIOLOGY | Facility: HOSPITAL | Age: 72
DRG: 853 | End: 2022-01-01
Attending: STUDENT IN AN ORGANIZED HEALTH CARE EDUCATION/TRAINING PROGRAM
Payer: MEDICARE

## 2022-01-01 ENCOUNTER — HOSPITAL ENCOUNTER (OUTPATIENT)
Dept: RADIOLOGY | Facility: HOSPITAL | Age: 72
Discharge: HOME | End: 2022-07-07
Attending: INTERNAL MEDICINE
Payer: MEDICARE

## 2022-01-01 ENCOUNTER — HOSPITAL ENCOUNTER (OUTPATIENT)
Dept: RADIOLOGY | Facility: HOSPITAL | Age: 72
Discharge: HOME | End: 2022-06-07
Attending: INTERNAL MEDICINE
Payer: MEDICARE

## 2022-01-01 ENCOUNTER — ANESTHESIA (OUTPATIENT)
Dept: SURGERY | Facility: HOSPITAL | Age: 72
DRG: 659 | End: 2022-01-01
Payer: MEDICARE

## 2022-01-01 ENCOUNTER — TELEPHONE (OUTPATIENT)
Dept: FAMILY MEDICINE | Facility: CLINIC | Age: 72
End: 2022-01-01
Payer: OTHER GOVERNMENT

## 2022-01-01 ENCOUNTER — HOSPITAL ENCOUNTER (OUTPATIENT)
Facility: HOSPITAL | Age: 72
Discharge: HOME | End: 2022-08-30
Attending: UROLOGY | Admitting: UROLOGY
Payer: MEDICARE

## 2022-01-01 ENCOUNTER — ANESTHESIA (OUTPATIENT)
Dept: SURGERY | Facility: HOSPITAL | Age: 72
DRG: 853 | End: 2022-01-01
Payer: OTHER GOVERNMENT

## 2022-01-01 ENCOUNTER — TELEPHONE (OUTPATIENT)
Dept: UROLOGY | Facility: CLINIC | Age: 72
End: 2022-01-01
Payer: MEDICARE

## 2022-01-01 ENCOUNTER — ANESTHESIA EVENT (OUTPATIENT)
Dept: SURGERY | Facility: HOSPITAL | Age: 72
DRG: 981 | End: 2022-01-01
Payer: OTHER GOVERNMENT

## 2022-01-01 ENCOUNTER — HOSPITAL ENCOUNTER (INPATIENT)
Facility: HOSPITAL | Age: 72
LOS: 11 days | DRG: 853 | End: 2022-11-27
Attending: EMERGENCY MEDICINE | Admitting: INTERNAL MEDICINE
Payer: MEDICARE

## 2022-01-01 ENCOUNTER — CLINICAL SUPPORT (OUTPATIENT)
Dept: CARDIOLOGY | Facility: HOSPITAL | Age: 72
DRG: 659 | End: 2022-01-01
Payer: OTHER GOVERNMENT

## 2022-01-01 ENCOUNTER — ANESTHESIA (OUTPATIENT)
Dept: SURGERY | Facility: HOSPITAL | Age: 72
DRG: 329 | End: 2022-01-01
Payer: OTHER GOVERNMENT

## 2022-01-01 ENCOUNTER — ANESTHESIA EVENT (OUTPATIENT)
Dept: SURGERY | Facility: HOSPITAL | Age: 72
DRG: 659 | End: 2022-01-01
Payer: MEDICARE

## 2022-01-01 ENCOUNTER — OFFICE VISIT (OUTPATIENT)
Dept: FAMILY MEDICINE | Facility: CLINIC | Age: 72
End: 2022-01-01
Payer: OTHER GOVERNMENT

## 2022-01-01 ENCOUNTER — TELEPHONE (OUTPATIENT)
Dept: INFECTIOUS DISEASES | Facility: CLINIC | Age: 72
End: 2022-01-01

## 2022-01-01 VITALS
HEIGHT: 68 IN | SYSTOLIC BLOOD PRESSURE: 136 MMHG | TEMPERATURE: 98 F | OXYGEN SATURATION: 96 % | RESPIRATION RATE: 20 BRPM | HEART RATE: 77 BPM | WEIGHT: 150.56 LBS | BODY MASS INDEX: 22.82 KG/M2 | DIASTOLIC BLOOD PRESSURE: 73 MMHG

## 2022-01-01 VITALS
TEMPERATURE: 99 F | WEIGHT: 142 LBS | RESPIRATION RATE: 12 BRPM | SYSTOLIC BLOOD PRESSURE: 120 MMHG | HEART RATE: 74 BPM | BODY MASS INDEX: 21.52 KG/M2 | OXYGEN SATURATION: 97 % | DIASTOLIC BLOOD PRESSURE: 68 MMHG | HEIGHT: 68 IN

## 2022-01-01 VITALS
BODY MASS INDEX: 24.48 KG/M2 | TEMPERATURE: 97 F | HEIGHT: 67 IN | DIASTOLIC BLOOD PRESSURE: 61 MMHG | OXYGEN SATURATION: 99 % | SYSTOLIC BLOOD PRESSURE: 101 MMHG | RESPIRATION RATE: 18 BRPM | HEART RATE: 74 BPM | WEIGHT: 156 LBS

## 2022-01-01 VITALS
RESPIRATION RATE: 18 BRPM | HEIGHT: 67 IN | TEMPERATURE: 98 F | SYSTOLIC BLOOD PRESSURE: 131 MMHG | BODY MASS INDEX: 23.08 KG/M2 | WEIGHT: 147.06 LBS | DIASTOLIC BLOOD PRESSURE: 77 MMHG | OXYGEN SATURATION: 94 % | HEART RATE: 75 BPM

## 2022-01-01 VITALS
RESPIRATION RATE: 14 BRPM | SYSTOLIC BLOOD PRESSURE: 120 MMHG | HEART RATE: 94 BPM | OXYGEN SATURATION: 98 % | BODY MASS INDEX: 23.39 KG/M2 | DIASTOLIC BLOOD PRESSURE: 58 MMHG | TEMPERATURE: 98 F | WEIGHT: 149 LBS | HEIGHT: 67 IN

## 2022-01-01 VITALS
BODY MASS INDEX: 22.13 KG/M2 | HEIGHT: 68 IN | OXYGEN SATURATION: 97 % | SYSTOLIC BLOOD PRESSURE: 128 MMHG | HEART RATE: 84 BPM | WEIGHT: 146 LBS | DIASTOLIC BLOOD PRESSURE: 60 MMHG | RESPIRATION RATE: 14 BRPM

## 2022-01-01 VITALS
RESPIRATION RATE: 16 BRPM | OXYGEN SATURATION: 93 % | BODY MASS INDEX: 24.01 KG/M2 | HEART RATE: 71 BPM | SYSTOLIC BLOOD PRESSURE: 106 MMHG | DIASTOLIC BLOOD PRESSURE: 55 MMHG | WEIGHT: 153 LBS | HEIGHT: 67 IN | TEMPERATURE: 99 F

## 2022-01-01 VITALS
SYSTOLIC BLOOD PRESSURE: 130 MMHG | BODY MASS INDEX: 24.96 KG/M2 | OXYGEN SATURATION: 95 % | WEIGHT: 164.69 LBS | TEMPERATURE: 97 F | HEIGHT: 68 IN | RESPIRATION RATE: 18 BRPM | HEART RATE: 76 BPM | DIASTOLIC BLOOD PRESSURE: 63 MMHG

## 2022-01-01 VITALS
BODY MASS INDEX: 16.33 KG/M2 | OXYGEN SATURATION: 75 % | TEMPERATURE: 97 F | HEIGHT: 67 IN | WEIGHT: 104.06 LBS | SYSTOLIC BLOOD PRESSURE: 64 MMHG | DIASTOLIC BLOOD PRESSURE: 37 MMHG

## 2022-01-01 VITALS
SYSTOLIC BLOOD PRESSURE: 112 MMHG | BODY MASS INDEX: 24.52 KG/M2 | OXYGEN SATURATION: 98 % | RESPIRATION RATE: 18 BRPM | HEART RATE: 79 BPM | DIASTOLIC BLOOD PRESSURE: 68 MMHG | HEIGHT: 67 IN | TEMPERATURE: 98 F | WEIGHT: 156.19 LBS

## 2022-01-01 VITALS
WEIGHT: 148.69 LBS | DIASTOLIC BLOOD PRESSURE: 52 MMHG | HEIGHT: 67 IN | SYSTOLIC BLOOD PRESSURE: 114 MMHG | BODY MASS INDEX: 23.34 KG/M2 | HEART RATE: 85 BPM

## 2022-01-01 VITALS
SYSTOLIC BLOOD PRESSURE: 144 MMHG | TEMPERATURE: 99 F | HEIGHT: 67 IN | WEIGHT: 161.5 LBS | BODY MASS INDEX: 24.48 KG/M2 | WEIGHT: 142 LBS | OXYGEN SATURATION: 95 % | BODY MASS INDEX: 22.29 KG/M2 | HEART RATE: 94 BPM | HEIGHT: 68 IN | HEART RATE: 78 BPM | TEMPERATURE: 99 F | DIASTOLIC BLOOD PRESSURE: 65 MMHG | RESPIRATION RATE: 17 BRPM | OXYGEN SATURATION: 96 %

## 2022-01-01 VITALS
HEART RATE: 86 BPM | WEIGHT: 145 LBS | BODY MASS INDEX: 22.76 KG/M2 | RESPIRATION RATE: 18 BRPM | SYSTOLIC BLOOD PRESSURE: 145 MMHG | TEMPERATURE: 99 F | HEIGHT: 67 IN | DIASTOLIC BLOOD PRESSURE: 65 MMHG | OXYGEN SATURATION: 97 %

## 2022-01-01 VITALS
OXYGEN SATURATION: 95 % | DIASTOLIC BLOOD PRESSURE: 58 MMHG | SYSTOLIC BLOOD PRESSURE: 105 MMHG | WEIGHT: 112 LBS | TEMPERATURE: 99 F | BODY MASS INDEX: 17.54 KG/M2 | HEART RATE: 91 BPM | RESPIRATION RATE: 18 BRPM

## 2022-01-01 VITALS
RESPIRATION RATE: 18 BRPM | SYSTOLIC BLOOD PRESSURE: 159 MMHG | HEIGHT: 68 IN | BODY MASS INDEX: 22.82 KG/M2 | DIASTOLIC BLOOD PRESSURE: 79 MMHG | OXYGEN SATURATION: 98 % | HEART RATE: 80 BPM | TEMPERATURE: 98 F | WEIGHT: 150.56 LBS

## 2022-01-01 VITALS — BODY MASS INDEX: 23.07 KG/M2 | HEIGHT: 67 IN | WEIGHT: 147 LBS

## 2022-01-01 VITALS — HEIGHT: 67 IN | BODY MASS INDEX: 16.32 KG/M2 | WEIGHT: 104 LBS

## 2022-01-01 DIAGNOSIS — Z86.16 HISTORY OF COVID-19: ICD-10-CM

## 2022-01-01 DIAGNOSIS — K55.9 ISCHEMIC COLITIS: ICD-10-CM

## 2022-01-01 DIAGNOSIS — S37.20XA INJURY OF BLADDER, INITIAL ENCOUNTER: ICD-10-CM

## 2022-01-01 DIAGNOSIS — S09.90XA INJURY OF HEAD, INITIAL ENCOUNTER: ICD-10-CM

## 2022-01-01 DIAGNOSIS — N18.6 END STAGE RENAL DISEASE: ICD-10-CM

## 2022-01-01 DIAGNOSIS — K52.9 CHRONIC DIARRHEA: Chronic | ICD-10-CM

## 2022-01-01 DIAGNOSIS — M79.89 THUMB SWELLING: ICD-10-CM

## 2022-01-01 DIAGNOSIS — Z99.2 ESRD ON DIALYSIS: Chronic | ICD-10-CM

## 2022-01-01 DIAGNOSIS — M54.50 ACUTE MIDLINE LOW BACK PAIN, UNSPECIFIED WHETHER SCIATICA PRESENT: ICD-10-CM

## 2022-01-01 DIAGNOSIS — Z12.31 ENCOUNTER FOR SCREENING MAMMOGRAM FOR BREAST CANCER: ICD-10-CM

## 2022-01-01 DIAGNOSIS — N17.9 AKI (ACUTE KIDNEY INJURY): ICD-10-CM

## 2022-01-01 DIAGNOSIS — Z90.49 STATUS POST TOTAL COLECTOMY: ICD-10-CM

## 2022-01-01 DIAGNOSIS — E78.5 HYPERLIPIDEMIA, UNSPECIFIED HYPERLIPIDEMIA TYPE: ICD-10-CM

## 2022-01-01 DIAGNOSIS — N18.6 ESRD ON DIALYSIS: ICD-10-CM

## 2022-01-01 DIAGNOSIS — Z78.0 ENCOUNTER FOR OSTEOPOROSIS SCREENING IN ASYMPTOMATIC POSTMENOPAUSAL PATIENT: ICD-10-CM

## 2022-01-01 DIAGNOSIS — N20.0 KIDNEY STONE: ICD-10-CM

## 2022-01-01 DIAGNOSIS — Z13.820 ENCOUNTER FOR OSTEOPOROSIS SCREENING IN ASYMPTOMATIC POSTMENOPAUSAL PATIENT: ICD-10-CM

## 2022-01-01 DIAGNOSIS — Z01.818 PRE-OP EVALUATION: ICD-10-CM

## 2022-01-01 DIAGNOSIS — R94.31 QT PROLONGATION: ICD-10-CM

## 2022-01-01 DIAGNOSIS — N20.1 URETEROLITHIASIS: ICD-10-CM

## 2022-01-01 DIAGNOSIS — R79.81 HYPOCARBIA: ICD-10-CM

## 2022-01-01 DIAGNOSIS — E87.6 HYPOKALEMIA: ICD-10-CM

## 2022-01-01 DIAGNOSIS — K55.9 ISCHEMIC COLITIS: Primary | ICD-10-CM

## 2022-01-01 DIAGNOSIS — U07.1 COVID: ICD-10-CM

## 2022-01-01 DIAGNOSIS — S01.01XA SCALP LACERATION, INITIAL ENCOUNTER: Primary | ICD-10-CM

## 2022-01-01 DIAGNOSIS — R32 URINARY INCONTINENCE, UNSPECIFIED TYPE: ICD-10-CM
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
## 2022-01-01 DIAGNOSIS — L03.90 CELLULITIS, UNSPECIFIED CELLULITIS SITE: ICD-10-CM

## 2022-01-01 DIAGNOSIS — N18.6 ESRD ON DIALYSIS: Chronic | ICD-10-CM

## 2022-01-01 DIAGNOSIS — Z96.0 S/P URETERAL STENT PLACEMENT: ICD-10-CM

## 2022-01-01 DIAGNOSIS — N18.6 END-STAGE RENAL DISEASE: ICD-10-CM

## 2022-01-01 DIAGNOSIS — D50.9 IRON DEFICIENCY ANEMIA, UNSPECIFIED IRON DEFICIENCY ANEMIA TYPE: ICD-10-CM

## 2022-01-01 DIAGNOSIS — N20.0 NEPHROLITHIASIS: ICD-10-CM

## 2022-01-01 DIAGNOSIS — U07.1 COVID-19: Primary | ICD-10-CM

## 2022-01-01 DIAGNOSIS — Z99.2 ESRD ON DIALYSIS: ICD-10-CM

## 2022-01-01 DIAGNOSIS — R23.1 PALLOR: Primary | ICD-10-CM

## 2022-01-01 DIAGNOSIS — R11.0 NAUSEA: ICD-10-CM

## 2022-01-01 DIAGNOSIS — K55.9 ISCHEMIC COLON: ICD-10-CM

## 2022-01-01 DIAGNOSIS — R07.9 CHEST PAIN: ICD-10-CM

## 2022-01-01 DIAGNOSIS — U07.1 COVID-19 VIRUS DETECTED: ICD-10-CM

## 2022-01-01 DIAGNOSIS — Z87.898 HISTORY OF DIFFICULT VENOUS ACCESS: ICD-10-CM

## 2022-01-01 DIAGNOSIS — N39.0 URINARY TRACT INFECTION DUE TO ESBL KLEBSIELLA: ICD-10-CM

## 2022-01-01 DIAGNOSIS — N13.30 HYDRONEPHROSIS, UNSPECIFIED HYDRONEPHROSIS TYPE: ICD-10-CM

## 2022-01-01 DIAGNOSIS — Z78.0 MENOPAUSE: ICD-10-CM

## 2022-01-01 DIAGNOSIS — F51.01 PRIMARY INSOMNIA: ICD-10-CM

## 2022-01-01 DIAGNOSIS — R60.0 LOCALIZED EDEMA: Primary | ICD-10-CM

## 2022-01-01 DIAGNOSIS — E11.40 CONTROLLED TYPE 2 DIABETES MELLITUS WITH NEUROPATHY: ICD-10-CM

## 2022-01-01 DIAGNOSIS — N20.0 NEPHROLITHIASIS: Primary | ICD-10-CM

## 2022-01-01 DIAGNOSIS — E83.42 HYPOMAGNESEMIA: ICD-10-CM

## 2022-01-01 DIAGNOSIS — M79.89 LEG SWELLING: ICD-10-CM

## 2022-01-01 DIAGNOSIS — M62.838 MUSCLE SPASMS OF BOTH LOWER EXTREMITIES: ICD-10-CM

## 2022-01-01 DIAGNOSIS — I47.29 OTHER VENTRICULAR TACHYCARDIA: ICD-10-CM

## 2022-01-01 DIAGNOSIS — N20.1 URETEROLITHIASIS: Primary | ICD-10-CM

## 2022-01-01 DIAGNOSIS — T82.858A STENOSIS OF OTHER VASCULAR PROSTHETIC DEVICES, IMPLANTS AND GRAFTS, INITIAL ENCOUNTER: ICD-10-CM

## 2022-01-01 DIAGNOSIS — I89.0 LYMPHEDEMA DUE TO RADIATION: ICD-10-CM

## 2022-01-01 DIAGNOSIS — N76.2 VULVAR CELLULITIS: ICD-10-CM

## 2022-01-01 DIAGNOSIS — N12 PYELONEPHRITIS: ICD-10-CM

## 2022-01-01 DIAGNOSIS — N12 EMPHYSEMATOUS PYELONEPHRITIS OF RIGHT KIDNEY: Primary | ICD-10-CM

## 2022-01-01 DIAGNOSIS — R09.82 POST-NASAL DRIP: ICD-10-CM

## 2022-01-01 DIAGNOSIS — N25.81 SECONDARY HYPERPARATHYROIDISM OF RENAL ORIGIN: ICD-10-CM

## 2022-01-01 DIAGNOSIS — Z01.00 ENCOUNTER FOR VISION SCREENING: Primary | ICD-10-CM

## 2022-01-01 DIAGNOSIS — E53.8 VITAMIN B12 DEFICIENCY: ICD-10-CM

## 2022-01-01 DIAGNOSIS — N18.4 CHRONIC RENAL FAILURE, STAGE 4 (SEVERE): ICD-10-CM

## 2022-01-01 DIAGNOSIS — R11.2 NAUSEA AND VOMITING, INTRACTABILITY OF VOMITING NOT SPECIFIED, UNSPECIFIED VOMITING TYPE: ICD-10-CM

## 2022-01-01 DIAGNOSIS — I95.9 HYPOTENSION: Primary | ICD-10-CM

## 2022-01-01 DIAGNOSIS — M79.89 RIGHT LEG SWELLING: Primary | ICD-10-CM

## 2022-01-01 DIAGNOSIS — E83.51 HYPOCALCEMIA: ICD-10-CM

## 2022-01-01 DIAGNOSIS — Z79.2 ENCOUNTER FOR LONG-TERM (CURRENT) USE OF ANTIBIOTICS: ICD-10-CM

## 2022-01-01 DIAGNOSIS — M79.606 LEG PAIN: ICD-10-CM

## 2022-01-01 DIAGNOSIS — D50.9 IRON DEFICIENCY ANEMIA, UNSPECIFIED IRON DEFICIENCY ANEMIA TYPE: Primary | ICD-10-CM

## 2022-01-01 DIAGNOSIS — N12 EMPHYSEMATOUS PYELITIS: ICD-10-CM

## 2022-01-01 DIAGNOSIS — N12 PYELONEPHRITIS: Primary | ICD-10-CM

## 2022-01-01 DIAGNOSIS — K56.7 ILEUS: ICD-10-CM

## 2022-01-01 DIAGNOSIS — S22.32XA CLOSED FRACTURE OF ONE RIB OF LEFT SIDE, INITIAL ENCOUNTER: Primary | ICD-10-CM

## 2022-01-01 DIAGNOSIS — E03.9 ACQUIRED HYPOTHYROIDISM: Primary | ICD-10-CM

## 2022-01-01 DIAGNOSIS — B96.89 URINARY TRACT INFECTION DUE TO ESBL KLEBSIELLA: ICD-10-CM

## 2022-01-01 DIAGNOSIS — L03.818 CELLULITIS OF OTHER SPECIFIED SITE: Primary | ICD-10-CM

## 2022-01-01 DIAGNOSIS — Z85.44 HISTORY OF CANCER OF VULVA: ICD-10-CM

## 2022-01-01 DIAGNOSIS — R55 SYNCOPE, UNSPECIFIED SYNCOPE TYPE: Primary | ICD-10-CM

## 2022-01-01 DIAGNOSIS — N12 EMPHYSEMATOUS PYELONEPHRITIS: ICD-10-CM

## 2022-01-01 DIAGNOSIS — S13.9XXA CERVICAL SPRAIN, INITIAL ENCOUNTER: ICD-10-CM

## 2022-01-01 DIAGNOSIS — R19.7 DIARRHEA, UNSPECIFIED TYPE: Chronic | ICD-10-CM

## 2022-01-01 DIAGNOSIS — N12 EMPHYSEMATOUS PYELONEPHRITIS OF RIGHT KIDNEY: ICD-10-CM

## 2022-01-01 DIAGNOSIS — N12 PYELONEPHRITIS OF RIGHT KIDNEY: ICD-10-CM

## 2022-01-01 DIAGNOSIS — N20.0 RENAL STONES: ICD-10-CM

## 2022-01-01 DIAGNOSIS — I49.9 ABNORMAL HEART RHYTHM: ICD-10-CM

## 2022-01-01 LAB
ABO + RH BLD: NORMAL
ALBUMIN SERPL BCP-MCNC: 1.5 G/DL (ref 3.5–5.2)
ALBUMIN SERPL BCP-MCNC: 1.5 G/DL (ref 3.5–5.2)
ALBUMIN SERPL BCP-MCNC: 1.6 G/DL (ref 3.5–5.2)
ALBUMIN SERPL BCP-MCNC: 1.7 G/DL (ref 3.5–5.2)
ALBUMIN SERPL BCP-MCNC: 1.9 G/DL (ref 3.5–5.2)
ALBUMIN SERPL BCP-MCNC: 2 G/DL (ref 3.5–5.2)
ALBUMIN SERPL BCP-MCNC: 2.1 G/DL (ref 3.5–5.2)
ALBUMIN SERPL BCP-MCNC: 2.1 G/DL (ref 3.5–5.2)
ALBUMIN SERPL BCP-MCNC: 2.2 G/DL (ref 3.5–5.2)
ALBUMIN SERPL BCP-MCNC: 2.3 G/DL (ref 3.5–5.2)
ALBUMIN SERPL BCP-MCNC: 2.6 G/DL (ref 3.5–5.2)
ALBUMIN SERPL BCP-MCNC: 2.6 G/DL (ref 3.5–5.2)
ALBUMIN SERPL BCP-MCNC: 2.7 G/DL (ref 3.5–5.2)
ALBUMIN SERPL BCP-MCNC: 2.8 G/DL (ref 3.5–5.2)
ALBUMIN SERPL BCP-MCNC: 2.9 G/DL (ref 3.5–5.2)
ALBUMIN SERPL BCP-MCNC: 3.1 G/DL (ref 3.5–5.2)
ALBUMIN SERPL BCP-MCNC: 3.2 G/DL (ref 3.5–5.2)
ALBUMIN SERPL BCP-MCNC: 3.2 G/DL (ref 3.5–5.2)
ALBUMIN SERPL BCP-MCNC: 3.5 G/DL (ref 3.5–5.2)
ALP SERPL-CCNC: 103 U/L (ref 55–135)
ALP SERPL-CCNC: 115 U/L (ref 55–135)
ALP SERPL-CCNC: 126 U/L (ref 55–135)
ALP SERPL-CCNC: 128 U/L (ref 55–135)
ALP SERPL-CCNC: 130 U/L (ref 55–135)
ALP SERPL-CCNC: 135 U/L (ref 55–135)
ALP SERPL-CCNC: 186 U/L (ref 55–135)
ALP SERPL-CCNC: 192 U/L (ref 55–135)
ALP SERPL-CCNC: 207 U/L (ref 55–135)
ALP SERPL-CCNC: 293 U/L (ref 55–135)
ALP SERPL-CCNC: 355 U/L (ref 55–135)
ALP SERPL-CCNC: 375 U/L (ref 55–135)
ALP SERPL-CCNC: 408 U/L (ref 55–135)
ALP SERPL-CCNC: 416 U/L (ref 55–135)
ALP SERPL-CCNC: 422 U/L (ref 55–135)
ALP SERPL-CCNC: 454 U/L (ref 55–135)
ALP SERPL-CCNC: 469 U/L (ref 55–135)
ALP SERPL-CCNC: 85 U/L (ref 55–135)
ALP SERPL-CCNC: 97 U/L (ref 55–135)
ALP SERPL-CCNC: 97 U/L (ref 55–135)
ALP SERPL-CCNC: 98 U/L (ref 55–135)
ALP SERPL-CCNC: 98 U/L (ref 55–135)
ALT SERPL W/O P-5'-P-CCNC: 10 U/L (ref 10–44)
ALT SERPL W/O P-5'-P-CCNC: 12 U/L (ref 10–44)
ALT SERPL W/O P-5'-P-CCNC: 13 U/L (ref 10–44)
ALT SERPL W/O P-5'-P-CCNC: 14 U/L (ref 10–44)
ALT SERPL W/O P-5'-P-CCNC: 14 U/L (ref 10–44)
ALT SERPL W/O P-5'-P-CCNC: 16 U/L (ref 10–44)
ALT SERPL W/O P-5'-P-CCNC: 17 U/L (ref 10–44)
ALT SERPL W/O P-5'-P-CCNC: 18 U/L (ref 10–44)
ALT SERPL W/O P-5'-P-CCNC: 20 U/L (ref 10–44)
ALT SERPL W/O P-5'-P-CCNC: 22 U/L (ref 10–44)
ALT SERPL W/O P-5'-P-CCNC: 23 U/L (ref 10–44)
ALT SERPL W/O P-5'-P-CCNC: 23 U/L (ref 10–44)
ALT SERPL W/O P-5'-P-CCNC: 26 U/L (ref 10–44)
ALT SERPL W/O P-5'-P-CCNC: 27 U/L (ref 10–44)
ALT SERPL W/O P-5'-P-CCNC: 30 U/L (ref 10–44)
ALT SERPL W/O P-5'-P-CCNC: 33 U/L (ref 10–44)
ALT SERPL W/O P-5'-P-CCNC: 9 U/L (ref 10–44)
AMMONIA PLAS-SCNC: 22 UMOL/L (ref 10–50)
AMMONIA PLAS-SCNC: 28 UMOL/L (ref 10–50)
ANION GAP SERPL CALC-SCNC: 10 MMOL/L (ref 8–16)
ANION GAP SERPL CALC-SCNC: 11 MMOL/L (ref 8–16)
ANION GAP SERPL CALC-SCNC: 12 MMOL/L (ref 8–16)
ANION GAP SERPL CALC-SCNC: 13 MMOL/L (ref 8–16)
ANION GAP SERPL CALC-SCNC: 13 MMOL/L (ref 8–16)
ANION GAP SERPL CALC-SCNC: 14 MMOL/L (ref 8–16)
ANION GAP SERPL CALC-SCNC: 15 MMOL/L (ref 8–16)
ANION GAP SERPL CALC-SCNC: 16 MMOL/L (ref 8–16)
ANION GAP SERPL CALC-SCNC: 16 MMOL/L (ref 8–16)
ANION GAP SERPL CALC-SCNC: 2 MMOL/L (ref 8–16)
ANION GAP SERPL CALC-SCNC: 4 MMOL/L (ref 8–16)
ANION GAP SERPL CALC-SCNC: 4 MMOL/L (ref 8–16)
ANION GAP SERPL CALC-SCNC: 5 MMOL/L (ref 8–16)
ANION GAP SERPL CALC-SCNC: 6 MMOL/L (ref 8–16)
ANION GAP SERPL CALC-SCNC: 7 MMOL/L (ref 8–16)
ANION GAP SERPL CALC-SCNC: 8 MMOL/L (ref 8–16)
ANION GAP SERPL CALC-SCNC: 9 MMOL/L (ref 8–16)
ANISOCYTOSIS BLD QL SMEAR: SLIGHT
ANISOCYTOSIS BLD QL SMEAR: SLIGHT
AORTIC ROOT ANNULUS: 3.27 CM
AORTIC VALVE CUSP SEPERATION: 1.18 CM
APTT BLDCRRT: 30.8 SEC (ref 21–32)
APTT BLDCRRT: 30.8 SEC (ref 21–32)
APTT BLDCRRT: 33.6 SEC (ref 21–32)
APTT BLDCRRT: 35.6 SEC (ref 21–32)
AST SERPL-CCNC: 12 U/L (ref 10–40)
AST SERPL-CCNC: 14 U/L (ref 10–40)
AST SERPL-CCNC: 15 U/L (ref 10–40)
AST SERPL-CCNC: 16 U/L (ref 10–40)
AST SERPL-CCNC: 16 U/L (ref 10–40)
AST SERPL-CCNC: 17 U/L (ref 10–40)
AST SERPL-CCNC: 19 U/L (ref 10–40)
AST SERPL-CCNC: 19 U/L (ref 10–40)
AST SERPL-CCNC: 20 U/L (ref 10–40)
AST SERPL-CCNC: 21 U/L (ref 10–40)
AST SERPL-CCNC: 23 U/L (ref 10–40)
AST SERPL-CCNC: 23 U/L (ref 10–40)
AST SERPL-CCNC: 27 U/L (ref 10–40)
AST SERPL-CCNC: 36 U/L (ref 10–40)
AST SERPL-CCNC: 36 U/L (ref 10–40)
AST SERPL-CCNC: 39 U/L (ref 10–40)
AST SERPL-CCNC: 47 U/L (ref 10–40)
AST SERPL-CCNC: 73 U/L (ref 10–40)
BACTERIA #/AREA URNS HPF: ABNORMAL /HPF
BACTERIA #/AREA URNS HPF: NEGATIVE /HPF
BACTERIA BLD CULT: NORMAL
BACTERIA UR CULT: ABNORMAL
BACTERIA UR CULT: NO GROWTH
BACTERIA UR CULT: NORMAL
BASOPHILS # BLD AUTO: 0 K/UL (ref 0–0.2)
BASOPHILS # BLD AUTO: 0 K/UL (ref 0–0.2)
BASOPHILS # BLD AUTO: 0.01 K/UL (ref 0–0.2)
BASOPHILS # BLD AUTO: 0.02 K/UL (ref 0–0.2)
BASOPHILS # BLD AUTO: 0.03 K/UL (ref 0–0.2)
BASOPHILS # BLD AUTO: 0.04 K/UL (ref 0–0.2)
BASOPHILS # BLD AUTO: 0.05 K/UL (ref 0–0.2)
BASOPHILS NFR BLD: 0 % (ref 0–1.9)
BASOPHILS NFR BLD: 0 % (ref 0–1.9)
BASOPHILS NFR BLD: 0.1 % (ref 0–1.9)
BASOPHILS NFR BLD: 0.2 % (ref 0–1.9)
BASOPHILS NFR BLD: 0.3 % (ref 0–1.9)
BASOPHILS NFR BLD: 0.4 % (ref 0–1.9)
BASOPHILS NFR BLD: 0.6 % (ref 0–1.9)
BILIRUB SERPL-MCNC: 0.5 MG/DL (ref 0.1–1)
BILIRUB SERPL-MCNC: 0.6 MG/DL (ref 0.1–1)
BILIRUB SERPL-MCNC: 0.6 MG/DL (ref 0.1–1)
BILIRUB SERPL-MCNC: 0.7 MG/DL (ref 0.1–1)
BILIRUB SERPL-MCNC: 0.7 MG/DL (ref 0.1–1)
BILIRUB SERPL-MCNC: 0.8 MG/DL (ref 0.1–1)
BILIRUB SERPL-MCNC: 0.9 MG/DL (ref 0.1–1)
BILIRUB SERPL-MCNC: 1.2 MG/DL (ref 0.1–1)
BILIRUB SERPL-MCNC: 1.4 MG/DL (ref 0.1–1)
BILIRUB SERPL-MCNC: 2.2 MG/DL (ref 0.1–1)
BILIRUB SERPL-MCNC: 2.3 MG/DL (ref 0.1–1)
BILIRUB SERPL-MCNC: 2.6 MG/DL (ref 0.1–1)
BILIRUB SERPL-MCNC: 3.3 MG/DL (ref 0.1–1)
BILIRUB SERPL-MCNC: 3.7 MG/DL (ref 0.1–1)
BILIRUB SERPL-MCNC: 4.3 MG/DL (ref 0.1–1)
BILIRUB SERPL-MCNC: 4.4 MG/DL (ref 0.1–1)
BILIRUB SERPL-MCNC: 4.9 MG/DL (ref 0.1–1)
BILIRUB UR QL STRIP: NEGATIVE
BLD GP AB SCN CELLS X3 SERPL QL: NORMAL
BLD PROD TYP BPU: NORMAL
BLOOD UNIT EXPIRATION DATE: NORMAL
BLOOD UNIT TYPE CODE: 6200
BLOOD UNIT TYPE: NORMAL
BNP SERPL-MCNC: 116 PG/ML (ref 0–99)
BNP SERPL-MCNC: 522 PG/ML (ref 0–99)
BSA FOR ECHO PROCEDURE: 1.49 M2
BUN SERPL-MCNC: 10 MG/DL (ref 8–23)
BUN SERPL-MCNC: 13 MG/DL (ref 8–23)
BUN SERPL-MCNC: 13 MG/DL (ref 8–23)
BUN SERPL-MCNC: 14 MG/DL (ref 8–23)
BUN SERPL-MCNC: 15 MG/DL (ref 8–23)
BUN SERPL-MCNC: 16 MG/DL (ref 8–23)
BUN SERPL-MCNC: 16 MG/DL (ref 8–23)
BUN SERPL-MCNC: 17 MG/DL (ref 8–23)
BUN SERPL-MCNC: 20 MG/DL (ref 8–23)
BUN SERPL-MCNC: 21 MG/DL (ref 8–23)
BUN SERPL-MCNC: 22 MG/DL (ref 8–23)
BUN SERPL-MCNC: 23 MG/DL (ref 8–23)
BUN SERPL-MCNC: 24 MG/DL (ref 8–23)
BUN SERPL-MCNC: 26 MG/DL (ref 8–23)
BUN SERPL-MCNC: 27 MG/DL (ref 8–23)
BUN SERPL-MCNC: 28 MG/DL (ref 8–23)
BUN SERPL-MCNC: 28 MG/DL (ref 8–23)
BUN SERPL-MCNC: 29 MG/DL (ref 8–23)
BUN SERPL-MCNC: 30 MG/DL (ref 8–23)
BUN SERPL-MCNC: 30 MG/DL (ref 8–23)
BUN SERPL-MCNC: 31 MG/DL (ref 8–23)
BUN SERPL-MCNC: 32 MG/DL (ref 8–23)
BUN SERPL-MCNC: 35 MG/DL (ref 8–23)
BUN SERPL-MCNC: 36 MG/DL (ref 8–23)
BUN SERPL-MCNC: 36 MG/DL (ref 8–23)
BUN SERPL-MCNC: 37 MG/DL (ref 8–23)
BUN SERPL-MCNC: 38 MG/DL (ref 8–23)
BUN SERPL-MCNC: 41 MG/DL (ref 8–23)
BUN SERPL-MCNC: 42 MG/DL (ref 8–23)
BUN SERPL-MCNC: 43 MG/DL (ref 8–23)
BUN SERPL-MCNC: 43 MG/DL (ref 8–23)
BUN SERPL-MCNC: 44 MG/DL (ref 8–23)
BUN SERPL-MCNC: 46 MG/DL (ref 8–23)
BUN SERPL-MCNC: 5 MG/DL (ref 8–23)
BUN SERPL-MCNC: 52 MG/DL (ref 8–23)
BUN SERPL-MCNC: 8 MG/DL (ref 8–23)
BUN SERPL-MCNC: 9 MG/DL (ref 8–23)
BUN SERPL-MCNC: 9 MG/DL (ref 8–23)
CA-I BLDV-SCNC: 1.02 MMOL/L (ref 1.06–1.42)
CA-I BLDV-SCNC: 1.04 MMOL/L (ref 1.06–1.42)
CALCIUM SERPL-MCNC: 6.2 MG/DL (ref 8.7–10.5)
CALCIUM SERPL-MCNC: 6.2 MG/DL (ref 8.7–10.5)
CALCIUM SERPL-MCNC: 7 MG/DL (ref 8.7–10.5)
CALCIUM SERPL-MCNC: 7.3 MG/DL (ref 8.7–10.5)
CALCIUM SERPL-MCNC: 7.3 MG/DL (ref 8.7–10.5)
CALCIUM SERPL-MCNC: 7.4 MG/DL (ref 8.7–10.5)
CALCIUM SERPL-MCNC: 7.6 MG/DL (ref 8.7–10.5)
CALCIUM SERPL-MCNC: 7.7 MG/DL (ref 8.7–10.5)
CALCIUM SERPL-MCNC: 7.7 MG/DL (ref 8.7–10.5)
CALCIUM SERPL-MCNC: 7.8 MG/DL (ref 8.7–10.5)
CALCIUM SERPL-MCNC: 7.9 MG/DL (ref 8.7–10.5)
CALCIUM SERPL-MCNC: 8 MG/DL (ref 8.7–10.5)
CALCIUM SERPL-MCNC: 8.1 MG/DL (ref 8.7–10.5)
CALCIUM SERPL-MCNC: 8.1 MG/DL (ref 8.7–10.5)
CALCIUM SERPL-MCNC: 8.2 MG/DL (ref 8.7–10.5)
CALCIUM SERPL-MCNC: 8.3 MG/DL (ref 8.7–10.5)
CALCIUM SERPL-MCNC: 8.4 MG/DL (ref 8.7–10.5)
CALCIUM SERPL-MCNC: 8.5 MG/DL (ref 8.7–10.5)
CALCIUM SERPL-MCNC: 8.6 MG/DL (ref 8.7–10.5)
CALCIUM SERPL-MCNC: 8.7 MG/DL (ref 8.7–10.5)
CALCIUM SERPL-MCNC: 8.7 MG/DL (ref 8.7–10.5)
CALCIUM SERPL-MCNC: 8.9 MG/DL (ref 8.7–10.5)
CALCIUM SERPL-MCNC: 8.9 MG/DL (ref 8.7–10.5)
CHLORIDE SERPL-SCNC: 101 MMOL/L (ref 95–110)
CHLORIDE SERPL-SCNC: 102 MMOL/L (ref 95–110)
CHLORIDE SERPL-SCNC: 102 MMOL/L (ref 95–110)
CHLORIDE SERPL-SCNC: 103 MMOL/L (ref 95–110)
CHLORIDE SERPL-SCNC: 104 MMOL/L (ref 95–110)
CHLORIDE SERPL-SCNC: 105 MMOL/L (ref 95–110)
CHLORIDE SERPL-SCNC: 106 MMOL/L (ref 95–110)
CHLORIDE SERPL-SCNC: 107 MMOL/L (ref 95–110)
CHLORIDE SERPL-SCNC: 108 MMOL/L (ref 95–110)
CHLORIDE SERPL-SCNC: 109 MMOL/L (ref 95–110)
CHLORIDE SERPL-SCNC: 110 MMOL/L (ref 95–110)
CHLORIDE SERPL-SCNC: 112 MMOL/L (ref 95–110)
CHLORIDE SERPL-SCNC: 113 MMOL/L (ref 95–110)
CHLORIDE SERPL-SCNC: 95 MMOL/L (ref 95–110)
CHLORIDE SERPL-SCNC: 97 MMOL/L (ref 95–110)
CHLORIDE SERPL-SCNC: 99 MMOL/L (ref 95–110)
CK SERPL-CCNC: 40 U/L (ref 20–180)
CK SERPL-CCNC: 50 U/L (ref 20–180)
CLARITY UR: ABNORMAL
CLARITY UR: CLEAR
CLARITY UR: CLEAR
CO2 SERPL-SCNC: 12 MMOL/L (ref 23–29)
CO2 SERPL-SCNC: 18 MMOL/L (ref 23–29)
CO2 SERPL-SCNC: 19 MMOL/L (ref 23–29)
CO2 SERPL-SCNC: 19 MMOL/L (ref 23–29)
CO2 SERPL-SCNC: 20 MMOL/L (ref 23–29)
CO2 SERPL-SCNC: 20 MMOL/L (ref 23–29)
CO2 SERPL-SCNC: 21 MMOL/L (ref 23–29)
CO2 SERPL-SCNC: 22 MMOL/L (ref 23–29)
CO2 SERPL-SCNC: 23 MMOL/L (ref 23–29)
CO2 SERPL-SCNC: 24 MMOL/L (ref 23–29)
CO2 SERPL-SCNC: 25 MMOL/L (ref 23–29)
CO2 SERPL-SCNC: 26 MMOL/L (ref 23–29)
CO2 SERPL-SCNC: 27 MMOL/L (ref 23–29)
CO2 SERPL-SCNC: 27 MMOL/L (ref 23–29)
CO2 SERPL-SCNC: 28 MMOL/L (ref 23–29)
CO2 SERPL-SCNC: 29 MMOL/L (ref 23–29)
CO2 SERPL-SCNC: 29 MMOL/L (ref 23–29)
CO2 SERPL-SCNC: 30 MMOL/L (ref 23–29)
CODING SYSTEM: NORMAL
COLOR UR: ABNORMAL
COLOR UR: YELLOW
COMPN STONE: NORMAL
CREAT SERPL-MCNC: 2.1 MG/DL (ref 0.5–1.4)
CREAT SERPL-MCNC: 2.3 MG/DL (ref 0.5–1.4)
CREAT SERPL-MCNC: 2.4 MG/DL (ref 0.5–1.4)
CREAT SERPL-MCNC: 2.8 MG/DL (ref 0.5–1.4)
CREAT SERPL-MCNC: 3.1 MG/DL (ref 0.5–1.4)
CREAT SERPL-MCNC: 3.1 MG/DL (ref 0.5–1.4)
CREAT SERPL-MCNC: 3.2 MG/DL (ref 0.5–1.4)
CREAT SERPL-MCNC: 3.3 MG/DL (ref 0.5–1.4)
CREAT SERPL-MCNC: 3.4 MG/DL (ref 0.5–1.4)
CREAT SERPL-MCNC: 3.4 MG/DL (ref 0.5–1.4)
CREAT SERPL-MCNC: 3.5 MG/DL (ref 0.5–1.4)
CREAT SERPL-MCNC: 3.8 MG/DL (ref 0.5–1.4)
CREAT SERPL-MCNC: 3.9 MG/DL (ref 0.5–1.4)
CREAT SERPL-MCNC: 4 MG/DL (ref 0.5–1.4)
CREAT SERPL-MCNC: 4.1 MG/DL (ref 0.5–1.4)
CREAT SERPL-MCNC: 4.2 MG/DL (ref 0.5–1.4)
CREAT SERPL-MCNC: 4.3 MG/DL (ref 0.5–1.4)
CREAT SERPL-MCNC: 4.4 MG/DL (ref 0.5–1.4)
CREAT SERPL-MCNC: 4.6 MG/DL (ref 0.5–1.4)
CREAT SERPL-MCNC: 4.7 MG/DL (ref 0.5–1.4)
CREAT SERPL-MCNC: 4.7 MG/DL (ref 0.5–1.4)
CREAT SERPL-MCNC: 4.8 MG/DL (ref 0.5–1.4)
CREAT SERPL-MCNC: 5.1 MG/DL (ref 0.5–1.4)
CREAT SERPL-MCNC: 5.2 MG/DL (ref 0.5–1.4)
CREAT SERPL-MCNC: 5.4 MG/DL (ref 0.5–1.4)
CREAT SERPL-MCNC: 5.5 MG/DL (ref 0.5–1.4)
CREAT SERPL-MCNC: 5.7 MG/DL (ref 0.5–1.4)
CREAT SERPL-MCNC: 5.8 MG/DL (ref 0.5–1.4)
CREAT SERPL-MCNC: 5.8 MG/DL (ref 0.5–1.4)
CREAT SERPL-MCNC: 5.9 MG/DL (ref 0.5–1.4)
CREAT SERPL-MCNC: 6.1 MG/DL (ref 0.5–1.4)
CREAT SERPL-MCNC: 6.3 MG/DL (ref 0.5–1.4)
CREAT SERPL-MCNC: 6.4 MG/DL (ref 0.5–1.4)
CREAT SERPL-MCNC: 6.7 MG/DL (ref 0.5–1.4)
CREAT SERPL-MCNC: 6.7 MG/DL (ref 0.5–1.4)
CREAT SERPL-MCNC: 7 MG/DL (ref 0.5–1.4)
CREAT SERPL-MCNC: 7 MG/DL (ref 0.5–1.4)
CREAT SERPL-MCNC: 7.2 MG/DL (ref 0.5–1.4)
CREAT SERPL-MCNC: 7.8 MG/DL (ref 0.5–1.4)
CV ECHO LV RWT: 0.65 CM
D DIMER PPP IA.FEU-MCNC: 1.96 MG/L FEU
DIFFERENTIAL METHOD: ABNORMAL
DISPENSE STATUS: NORMAL
ECHO LV POSTERIOR WALL: 1.13 CM (ref 0.6–1.1)
EJECTION FRACTION: 75 %
EOSINOPHIL # BLD AUTO: 0 K/UL (ref 0–0.5)
EOSINOPHIL # BLD AUTO: 0.1 K/UL (ref 0–0.5)
EOSINOPHIL # BLD AUTO: 0.2 K/UL (ref 0–0.5)
EOSINOPHIL # BLD AUTO: 0.3 K/UL (ref 0–0.5)
EOSINOPHIL # BLD AUTO: 0.4 K/UL (ref 0–0.5)
EOSINOPHIL # BLD AUTO: 0.4 K/UL (ref 0–0.5)
EOSINOPHIL # BLD AUTO: 0.5 K/UL (ref 0–0.5)
EOSINOPHIL NFR BLD: 0 % (ref 0–8)
EOSINOPHIL NFR BLD: 0.1 % (ref 0–8)
EOSINOPHIL NFR BLD: 0.2 % (ref 0–8)
EOSINOPHIL NFR BLD: 0.2 % (ref 0–8)
EOSINOPHIL NFR BLD: 0.3 % (ref 0–8)
EOSINOPHIL NFR BLD: 0.4 % (ref 0–8)
EOSINOPHIL NFR BLD: 0.5 % (ref 0–8)
EOSINOPHIL NFR BLD: 0.6 % (ref 0–8)
EOSINOPHIL NFR BLD: 0.6 % (ref 0–8)
EOSINOPHIL NFR BLD: 1 % (ref 0–8)
EOSINOPHIL NFR BLD: 1.1 % (ref 0–8)
EOSINOPHIL NFR BLD: 1.2 % (ref 0–8)
EOSINOPHIL NFR BLD: 1.4 % (ref 0–8)
EOSINOPHIL NFR BLD: 1.5 % (ref 0–8)
EOSINOPHIL NFR BLD: 1.5 % (ref 0–8)
EOSINOPHIL NFR BLD: 1.6 % (ref 0–8)
EOSINOPHIL NFR BLD: 1.6 % (ref 0–8)
EOSINOPHIL NFR BLD: 1.7 % (ref 0–8)
EOSINOPHIL NFR BLD: 2.1 % (ref 0–8)
EOSINOPHIL NFR BLD: 2.3 % (ref 0–8)
EOSINOPHIL NFR BLD: 2.4 % (ref 0–8)
EOSINOPHIL NFR BLD: 2.5 % (ref 0–8)
EOSINOPHIL NFR BLD: 2.9 % (ref 0–8)
EOSINOPHIL NFR BLD: 3 % (ref 0–8)
EOSINOPHIL NFR BLD: 3 % (ref 0–8)
EOSINOPHIL NFR BLD: 3.1 % (ref 0–8)
EOSINOPHIL NFR BLD: 3.3 % (ref 0–8)
EOSINOPHIL NFR BLD: 3.3 % (ref 0–8)
EOSINOPHIL NFR BLD: 3.4 % (ref 0–8)
EOSINOPHIL NFR BLD: 3.7 % (ref 0–8)
EOSINOPHIL NFR BLD: 4 % (ref 0–8)
EOSINOPHIL NFR BLD: 4.1 % (ref 0–8)
EOSINOPHIL NFR BLD: 4.3 % (ref 0–8)
EOSINOPHIL NFR BLD: 4.8 % (ref 0–8)
ERYTHROCYTE [DISTWIDTH] IN BLOOD BY AUTOMATED COUNT: 13.3 % (ref 11.5–14.5)
ERYTHROCYTE [DISTWIDTH] IN BLOOD BY AUTOMATED COUNT: 13.6 % (ref 11.5–14.5)
ERYTHROCYTE [DISTWIDTH] IN BLOOD BY AUTOMATED COUNT: 13.7 % (ref 11.5–14.5)
ERYTHROCYTE [DISTWIDTH] IN BLOOD BY AUTOMATED COUNT: 13.9 % (ref 11.5–14.5)
ERYTHROCYTE [DISTWIDTH] IN BLOOD BY AUTOMATED COUNT: 14 % (ref 11.5–14.5)
ERYTHROCYTE [DISTWIDTH] IN BLOOD BY AUTOMATED COUNT: 14.1 % (ref 11.5–14.5)
ERYTHROCYTE [DISTWIDTH] IN BLOOD BY AUTOMATED COUNT: 14.1 % (ref 11.5–14.5)
ERYTHROCYTE [DISTWIDTH] IN BLOOD BY AUTOMATED COUNT: 14.4 % (ref 11.5–14.5)
ERYTHROCYTE [DISTWIDTH] IN BLOOD BY AUTOMATED COUNT: 14.5 % (ref 11.5–14.5)
ERYTHROCYTE [DISTWIDTH] IN BLOOD BY AUTOMATED COUNT: 14.7 % (ref 11.5–14.5)
ERYTHROCYTE [DISTWIDTH] IN BLOOD BY AUTOMATED COUNT: 14.8 % (ref 11.5–14.5)
ERYTHROCYTE [DISTWIDTH] IN BLOOD BY AUTOMATED COUNT: 14.9 % (ref 11.5–14.5)
ERYTHROCYTE [DISTWIDTH] IN BLOOD BY AUTOMATED COUNT: 15 % (ref 11.5–14.5)
ERYTHROCYTE [DISTWIDTH] IN BLOOD BY AUTOMATED COUNT: 15.1 % (ref 11.5–14.5)
ERYTHROCYTE [DISTWIDTH] IN BLOOD BY AUTOMATED COUNT: 15.1 % (ref 11.5–14.5)
ERYTHROCYTE [DISTWIDTH] IN BLOOD BY AUTOMATED COUNT: 15.3 % (ref 11.5–14.5)
ERYTHROCYTE [DISTWIDTH] IN BLOOD BY AUTOMATED COUNT: 15.3 % (ref 11.5–14.5)
ERYTHROCYTE [DISTWIDTH] IN BLOOD BY AUTOMATED COUNT: 15.4 % (ref 11.5–14.5)
ERYTHROCYTE [DISTWIDTH] IN BLOOD BY AUTOMATED COUNT: 15.6 % (ref 11.5–14.5)
ERYTHROCYTE [DISTWIDTH] IN BLOOD BY AUTOMATED COUNT: 15.7 % (ref 11.5–14.5)
ERYTHROCYTE [DISTWIDTH] IN BLOOD BY AUTOMATED COUNT: 15.8 % (ref 11.5–14.5)
ERYTHROCYTE [DISTWIDTH] IN BLOOD BY AUTOMATED COUNT: 16.3 % (ref 11.5–14.5)
ERYTHROCYTE [DISTWIDTH] IN BLOOD BY AUTOMATED COUNT: 16.3 % (ref 11.5–14.5)
ERYTHROCYTE [DISTWIDTH] IN BLOOD BY AUTOMATED COUNT: 16.8 % (ref 11.5–14.5)
ERYTHROCYTE [DISTWIDTH] IN BLOOD BY AUTOMATED COUNT: 16.9 % (ref 11.5–14.5)
ERYTHROCYTE [DISTWIDTH] IN BLOOD BY AUTOMATED COUNT: 16.9 % (ref 11.5–14.5)
ERYTHROCYTE [DISTWIDTH] IN BLOOD BY AUTOMATED COUNT: 17 % (ref 11.5–14.5)
ERYTHROCYTE [DISTWIDTH] IN BLOOD BY AUTOMATED COUNT: 17 % (ref 11.5–14.5)
ERYTHROCYTE [DISTWIDTH] IN BLOOD BY AUTOMATED COUNT: 17.1 % (ref 11.5–14.5)
ERYTHROCYTE [DISTWIDTH] IN BLOOD BY AUTOMATED COUNT: 17.2 % (ref 11.5–14.5)
ERYTHROCYTE [DISTWIDTH] IN BLOOD BY AUTOMATED COUNT: 17.2 % (ref 11.5–14.5)
ERYTHROCYTE [DISTWIDTH] IN BLOOD BY AUTOMATED COUNT: 17.5 % (ref 11.5–14.5)
ERYTHROCYTE [DISTWIDTH] IN BLOOD BY AUTOMATED COUNT: 17.7 % (ref 11.5–14.5)
ERYTHROCYTE [DISTWIDTH] IN BLOOD BY AUTOMATED COUNT: 17.8 % (ref 11.5–14.5)
ERYTHROCYTE [DISTWIDTH] IN BLOOD BY AUTOMATED COUNT: 18.2 % (ref 11.5–14.5)
ERYTHROCYTE [DISTWIDTH] IN BLOOD BY AUTOMATED COUNT: 18.5 % (ref 11.5–14.5)
ERYTHROCYTE [DISTWIDTH] IN BLOOD BY AUTOMATED COUNT: 18.5 % (ref 11.5–14.5)
ERYTHROCYTE [DISTWIDTH] IN BLOOD BY AUTOMATED COUNT: 18.7 % (ref 11.5–14.5)
ERYTHROCYTE [DISTWIDTH] IN BLOOD BY AUTOMATED COUNT: 19.1 % (ref 11.5–14.5)
ERYTHROCYTE [DISTWIDTH] IN BLOOD BY AUTOMATED COUNT: 20.7 % (ref 11.5–14.5)
ERYTHROCYTE [DISTWIDTH] IN BLOOD BY AUTOMATED COUNT: 20.8 % (ref 11.5–14.5)
ERYTHROCYTE [DISTWIDTH] IN BLOOD BY AUTOMATED COUNT: 21.2 % (ref 11.5–14.5)
ERYTHROCYTE [DISTWIDTH] IN BLOOD BY AUTOMATED COUNT: 21.3 % (ref 11.5–14.5)
ERYTHROCYTE [DISTWIDTH] IN BLOOD BY AUTOMATED COUNT: 21.4 % (ref 11.5–14.5)
ERYTHROCYTE [DISTWIDTH] IN BLOOD BY AUTOMATED COUNT: 21.4 % (ref 11.5–14.5)
ERYTHROCYTE [DISTWIDTH] IN BLOOD BY AUTOMATED COUNT: 21.9 % (ref 11.5–14.5)
ERYTHROCYTE [DISTWIDTH] IN BLOOD BY AUTOMATED COUNT: 22.1 % (ref 11.5–14.5)
ERYTHROCYTE [DISTWIDTH] IN BLOOD BY AUTOMATED COUNT: 22.3 % (ref 11.5–14.5)
ERYTHROCYTE [DISTWIDTH] IN BLOOD BY AUTOMATED COUNT: 22.4 % (ref 11.5–14.5)
ERYTHROCYTE [SEDIMENTATION RATE] IN BLOOD BY WESTERGREN METHOD: 14 MM/HR (ref 0–20)
EST. GFR  (AFRICAN AMERICAN): 10.1 ML/MIN/1.73 M^2
EST. GFR  (AFRICAN AMERICAN): 10.8 ML/MIN/1.73 M^2
EST. GFR  (AFRICAN AMERICAN): 10.8 ML/MIN/1.73 M^2
EST. GFR  (AFRICAN AMERICAN): 11.2 ML/MIN/1.73 M^2
EST. GFR  (AFRICAN AMERICAN): 11.2 ML/MIN/1.73 M^2
EST. GFR  (AFRICAN AMERICAN): 11.8 ML/MIN/1.73 M^2
EST. GFR  (AFRICAN AMERICAN): 12.3 ML/MIN/1.73 M^2
EST. GFR  (AFRICAN AMERICAN): 12.5 ML/MIN/1.73 M^2
EST. GFR  (AFRICAN AMERICAN): 12.5 ML/MIN/1.73 M^2
EST. GFR  (AFRICAN AMERICAN): 16.6 ML/MIN/1.73 M^2
EST. GFR  (AFRICAN AMERICAN): 5 ML/MIN/1.73 M^2
EST. GFR  (AFRICAN AMERICAN): 6 ML/MIN/1.73 M^2
EST. GFR  (AFRICAN AMERICAN): 6.6 ML/MIN/1.73 M^2
EST. GFR  (AFRICAN AMERICAN): 7 ML/MIN/1.73 M^2
EST. GFR  (AFRICAN AMERICAN): 7 ML/MIN/1.73 M^2
EST. GFR  (AFRICAN AMERICAN): 7.4 ML/MIN/1.73 M^2
EST. GFR  (AFRICAN AMERICAN): 7.8 ML/MIN/1.73 M^2
EST. GFR  (AFRICAN AMERICAN): 7.8 ML/MIN/1.73 M^2
EST. GFR  (AFRICAN AMERICAN): 8 ML/MIN/1.73 M^2
EST. GFR  (AFRICAN AMERICAN): 8.9 ML/MIN/1.73 M^2
EST. GFR  (AFRICAN AMERICAN): 8.9 ML/MIN/1.73 M^2
EST. GFR  (AFRICAN AMERICAN): 9.1 ML/MIN/1.73 M^2
EST. GFR  (AFRICAN AMERICAN): 9.8 ML/MIN/1.73 M^2
EST. GFR  (NO RACE VARIABLE): 10.1 ML/MIN/1.73 M^2
EST. GFR  (NO RACE VARIABLE): 10.4 ML/MIN/1.73 M^2
EST. GFR  (NO RACE VARIABLE): 10.7 ML/MIN/1.73 M^2
EST. GFR  (NO RACE VARIABLE): 11.7 ML/MIN/1.73 M^2
EST. GFR  (NO RACE VARIABLE): 12.1 ML/MIN/1.73 M^2
EST. GFR  (NO RACE VARIABLE): 13.3 ML/MIN/1.73 M^2
EST. GFR  (NO RACE VARIABLE): 13.8 ML/MIN/1.73 M^2
EST. GFR  (NO RACE VARIABLE): 13.8 ML/MIN/1.73 M^2
EST. GFR  (NO RACE VARIABLE): 14.3 ML/MIN/1.73 M^2
EST. GFR  (NO RACE VARIABLE): 14.8 ML/MIN/1.73 M^2
EST. GFR  (NO RACE VARIABLE): 15.4 ML/MIN/1.73 M^2
EST. GFR  (NO RACE VARIABLE): 17.4 ML/MIN/1.73 M^2
EST. GFR  (NO RACE VARIABLE): 20.9 ML/MIN/1.73 M^2
EST. GFR  (NO RACE VARIABLE): 22 ML/MIN/1.73 M^2
EST. GFR  (NO RACE VARIABLE): 24.6 ML/MIN/1.73 M^2
EST. GFR  (NO RACE VARIABLE): 5.6 ML/MIN/1.73 M^2
EST. GFR  (NO RACE VARIABLE): 5.8 ML/MIN/1.73 M^2
EST. GFR  (NO RACE VARIABLE): 6.1 ML/MIN/1.73 M^2
EST. GFR  (NO RACE VARIABLE): 6.5 ML/MIN/1.73 M^2
EST. GFR  (NO RACE VARIABLE): 6.6 ML/MIN/1.73 M^2
EST. GFR  (NO RACE VARIABLE): 7.1 ML/MIN/1.73 M^2
EST. GFR  (NO RACE VARIABLE): 7.1 ML/MIN/1.73 M^2
EST. GFR  (NO RACE VARIABLE): 7.7 ML/MIN/1.73 M^2
EST. GFR  (NO RACE VARIABLE): 7.9 ML/MIN/1.73 M^2
EST. GFR  (NO RACE VARIABLE): 8.3 ML/MIN/1.73 M^2
EST. GFR  (NO RACE VARIABLE): 9.3 ML/MIN/1.73 M^2
EST. GFR  (NO RACE VARIABLE): 9.6 ML/MIN/1.73 M^2
EST. GFR  (NON AFRICAN AMERICAN): 10.2 ML/MIN/1.73 M^2
EST. GFR  (NON AFRICAN AMERICAN): 10.6 ML/MIN/1.73 M^2
EST. GFR  (NON AFRICAN AMERICAN): 10.9 ML/MIN/1.73 M^2
EST. GFR  (NON AFRICAN AMERICAN): 10.9 ML/MIN/1.73 M^2
EST. GFR  (NON AFRICAN AMERICAN): 14.4 ML/MIN/1.73 M^2
EST. GFR  (NON AFRICAN AMERICAN): 5 ML/MIN/1.73 M^2
EST. GFR  (NON AFRICAN AMERICAN): 5 ML/MIN/1.73 M^2
EST. GFR  (NON AFRICAN AMERICAN): 5.7 ML/MIN/1.73 M^2
EST. GFR  (NON AFRICAN AMERICAN): 6 ML/MIN/1.73 M^2
EST. GFR  (NON AFRICAN AMERICAN): 6 ML/MIN/1.73 M^2
EST. GFR  (NON AFRICAN AMERICAN): 6.4 ML/MIN/1.73 M^2
EST. GFR  (NON AFRICAN AMERICAN): 6.8 ML/MIN/1.73 M^2
EST. GFR  (NON AFRICAN AMERICAN): 6.8 ML/MIN/1.73 M^2
EST. GFR  (NON AFRICAN AMERICAN): 6.9 ML/MIN/1.73 M^2
EST. GFR  (NON AFRICAN AMERICAN): 7.7 ML/MIN/1.73 M^2
EST. GFR  (NON AFRICAN AMERICAN): 7.7 ML/MIN/1.73 M^2
EST. GFR  (NON AFRICAN AMERICAN): 7.9 ML/MIN/1.73 M^2
EST. GFR  (NON AFRICAN AMERICAN): 8.5 ML/MIN/1.73 M^2
EST. GFR  (NON AFRICAN AMERICAN): 8.7 ML/MIN/1.73 M^2
EST. GFR  (NON AFRICAN AMERICAN): 9.4 ML/MIN/1.73 M^2
EST. GFR  (NON AFRICAN AMERICAN): 9.4 ML/MIN/1.73 M^2
EST. GFR  (NON AFRICAN AMERICAN): 9.7 ML/MIN/1.73 M^2
EST. GFR  (NON AFRICAN AMERICAN): 9.7 ML/MIN/1.73 M^2
FERRITIN SERPL-MCNC: 831 NG/ML (ref 20–300)
FOLATE SERPL-MCNC: 6.4 NG/ML (ref 4–24)
FOLATE SERPL-MCNC: 6.6 NG/ML (ref 4–24)
FOLATE SERPL-MCNC: 8.2 NG/ML (ref 4–24)
FRACTIONAL SHORTENING: 39 % (ref 28–44)
GLUCOSE SERPL-MCNC: 100 MG/DL (ref 70–110)
GLUCOSE SERPL-MCNC: 100 MG/DL (ref 70–110)
GLUCOSE SERPL-MCNC: 103 MG/DL (ref 70–110)
GLUCOSE SERPL-MCNC: 108 MG/DL (ref 70–110)
GLUCOSE SERPL-MCNC: 124 MG/DL (ref 70–110)
GLUCOSE SERPL-MCNC: 128 MG/DL (ref 70–110)
GLUCOSE SERPL-MCNC: 133 MG/DL (ref 70–110)
GLUCOSE SERPL-MCNC: 133 MG/DL (ref 70–110)
GLUCOSE SERPL-MCNC: 137 MG/DL (ref 70–110)
GLUCOSE SERPL-MCNC: 146 MG/DL (ref 70–110)
GLUCOSE SERPL-MCNC: 148 MG/DL (ref 70–110)
GLUCOSE SERPL-MCNC: 154 MG/DL (ref 70–110)
GLUCOSE SERPL-MCNC: 156 MG/DL (ref 70–110)
GLUCOSE SERPL-MCNC: 157 MG/DL (ref 70–110)
GLUCOSE SERPL-MCNC: 160 MG/DL (ref 70–110)
GLUCOSE SERPL-MCNC: 161 MG/DL (ref 70–110)
GLUCOSE SERPL-MCNC: 163 MG/DL (ref 70–110)
GLUCOSE SERPL-MCNC: 169 MG/DL (ref 70–110)
GLUCOSE SERPL-MCNC: 184 MG/DL (ref 70–110)
GLUCOSE SERPL-MCNC: 200 MG/DL (ref 70–110)
GLUCOSE SERPL-MCNC: 52 MG/DL (ref 70–110)
GLUCOSE SERPL-MCNC: 62 MG/DL (ref 70–110)
GLUCOSE SERPL-MCNC: 62 MG/DL (ref 70–110)
GLUCOSE SERPL-MCNC: 66 MG/DL (ref 70–110)
GLUCOSE SERPL-MCNC: 66 MG/DL (ref 70–110)
GLUCOSE SERPL-MCNC: 67 MG/DL (ref 70–110)
GLUCOSE SERPL-MCNC: 69 MG/DL (ref 70–110)
GLUCOSE SERPL-MCNC: 70 MG/DL (ref 70–110)
GLUCOSE SERPL-MCNC: 73 MG/DL (ref 70–110)
GLUCOSE SERPL-MCNC: 74 MG/DL (ref 70–110)
GLUCOSE SERPL-MCNC: 74 MG/DL (ref 70–110)
GLUCOSE SERPL-MCNC: 75 MG/DL (ref 70–110)
GLUCOSE SERPL-MCNC: 76 MG/DL (ref 70–110)
GLUCOSE SERPL-MCNC: 77 MG/DL (ref 70–110)
GLUCOSE SERPL-MCNC: 78 MG/DL (ref 70–110)
GLUCOSE SERPL-MCNC: 78 MG/DL (ref 70–110)
GLUCOSE SERPL-MCNC: 79 MG/DL (ref 70–110)
GLUCOSE SERPL-MCNC: 79 MG/DL (ref 70–110)
GLUCOSE SERPL-MCNC: 80 MG/DL (ref 70–110)
GLUCOSE SERPL-MCNC: 81 MG/DL (ref 70–110)
GLUCOSE SERPL-MCNC: 82 MG/DL (ref 70–110)
GLUCOSE SERPL-MCNC: 83 MG/DL (ref 70–110)
GLUCOSE SERPL-MCNC: 84 MG/DL (ref 70–110)
GLUCOSE SERPL-MCNC: 84 MG/DL (ref 70–110)
GLUCOSE SERPL-MCNC: 85 MG/DL (ref 70–110)
GLUCOSE SERPL-MCNC: 85 MG/DL (ref 70–110)
GLUCOSE SERPL-MCNC: 88 MG/DL (ref 70–110)
GLUCOSE SERPL-MCNC: 89 MG/DL (ref 70–110)
GLUCOSE SERPL-MCNC: 90 MG/DL (ref 70–110)
GLUCOSE SERPL-MCNC: 91 MG/DL (ref 70–110)
GLUCOSE SERPL-MCNC: 91 MG/DL (ref 70–110)
GLUCOSE SERPL-MCNC: 92 MG/DL (ref 70–110)
GLUCOSE SERPL-MCNC: 93 MG/DL (ref 70–110)
GLUCOSE SERPL-MCNC: 95 MG/DL (ref 70–110)
GLUCOSE SERPL-MCNC: 96 MG/DL (ref 70–110)
GLUCOSE SERPL-MCNC: 96 MG/DL (ref 70–110)
GLUCOSE SERPL-MCNC: 97 MG/DL (ref 70–110)
GLUCOSE UR QL STRIP: ABNORMAL
GLUCOSE UR QL STRIP: NEGATIVE
HBV CORE AB SERPL QL IA: NEGATIVE
HBV SURFACE AB SER QL: REACTIVE
HBV SURFACE AG SERPL QL IA: NEGATIVE
HCO3 UR-SCNC: 24.5 MMOL/L (ref 24–28)
HCO3 UR-SCNC: 28 MMOL/L (ref 24–28)
HCO3 UR-SCNC: 30 MMOL/L (ref 24–28)
HCO3 UR-SCNC: 30.8 MMOL/L (ref 24–28)
HCT VFR BLD AUTO: 23.4 % (ref 37–48.5)
HCT VFR BLD AUTO: 23.6 % (ref 37–48.5)
HCT VFR BLD AUTO: 24 % (ref 37–48.5)
HCT VFR BLD AUTO: 25.1 % (ref 37–48.5)
HCT VFR BLD AUTO: 25.6 % (ref 37–48.5)
HCT VFR BLD AUTO: 25.6 % (ref 37–48.5)
HCT VFR BLD AUTO: 26.6 % (ref 37–48.5)
HCT VFR BLD AUTO: 27 % (ref 37–48.5)
HCT VFR BLD AUTO: 27.5 % (ref 37–48.5)
HCT VFR BLD AUTO: 27.6 % (ref 37–48.5)
HCT VFR BLD AUTO: 27.6 % (ref 37–48.5)
HCT VFR BLD AUTO: 27.9 % (ref 37–48.5)
HCT VFR BLD AUTO: 28.1 % (ref 37–48.5)
HCT VFR BLD AUTO: 28.3 % (ref 37–48.5)
HCT VFR BLD AUTO: 28.6 % (ref 37–48.5)
HCT VFR BLD AUTO: 28.7 % (ref 37–48.5)
HCT VFR BLD AUTO: 29 % (ref 37–48.5)
HCT VFR BLD AUTO: 29.2 % (ref 37–48.5)
HCT VFR BLD AUTO: 29.2 % (ref 37–48.5)
HCT VFR BLD AUTO: 29.3 % (ref 37–48.5)
HCT VFR BLD AUTO: 29.4 % (ref 37–48.5)
HCT VFR BLD AUTO: 29.7 % (ref 37–48.5)
HCT VFR BLD AUTO: 29.9 % (ref 37–48.5)
HCT VFR BLD AUTO: 30 % (ref 37–48.5)
HCT VFR BLD AUTO: 30.3 % (ref 37–48.5)
HCT VFR BLD AUTO: 30.5 % (ref 37–48.5)
HCT VFR BLD AUTO: 30.6 % (ref 37–48.5)
HCT VFR BLD AUTO: 30.7 % (ref 37–48.5)
HCT VFR BLD AUTO: 30.9 % (ref 37–48.5)
HCT VFR BLD AUTO: 31 % (ref 37–48.5)
HCT VFR BLD AUTO: 31.1 % (ref 37–48.5)
HCT VFR BLD AUTO: 31.3 % (ref 37–48.5)
HCT VFR BLD AUTO: 31.7 % (ref 37–48.5)
HCT VFR BLD AUTO: 32 % (ref 37–48.5)
HCT VFR BLD AUTO: 32.4 % (ref 37–48.5)
HCT VFR BLD AUTO: 32.4 % (ref 37–48.5)
HCT VFR BLD AUTO: 32.5 % (ref 37–48.5)
HCT VFR BLD AUTO: 32.6 % (ref 37–48.5)
HCT VFR BLD AUTO: 32.7 % (ref 37–48.5)
HCT VFR BLD AUTO: 33.2 % (ref 37–48.5)
HCT VFR BLD AUTO: 33.6 % (ref 37–48.5)
HCT VFR BLD AUTO: 33.7 % (ref 37–48.5)
HCT VFR BLD AUTO: 33.8 % (ref 37–48.5)
HCT VFR BLD AUTO: 33.8 % (ref 37–48.5)
HCT VFR BLD AUTO: 33.9 % (ref 37–48.5)
HCT VFR BLD AUTO: 34.4 % (ref 37–48.5)
HCT VFR BLD AUTO: 35 % (ref 37–48.5)
HCT VFR BLD AUTO: 35.3 % (ref 37–48.5)
HCT VFR BLD AUTO: 35.5 % (ref 37–48.5)
HCT VFR BLD AUTO: 36.3 % (ref 37–48.5)
HCT VFR BLD AUTO: 36.3 % (ref 37–48.5)
HCT VFR BLD AUTO: 36.7 % (ref 37–48.5)
HCT VFR BLD CALC: 24 %PCV (ref 36–54)
HCT VFR BLD CALC: 25 %PCV (ref 36–54)
HCT VFR BLD CALC: 30 %PCV (ref 36–54)
HCT VFR BLD CALC: 35 %PCV (ref 36–54)
HGB BLD-MCNC: 10 G/DL (ref 12–16)
HGB BLD-MCNC: 10 G/DL (ref 12–16)
HGB BLD-MCNC: 10.1 G/DL (ref 12–16)
HGB BLD-MCNC: 10.4 G/DL (ref 12–16)
HGB BLD-MCNC: 10.5 G/DL (ref 12–16)
HGB BLD-MCNC: 10.6 G/DL (ref 12–16)
HGB BLD-MCNC: 10.8 G/DL (ref 12–16)
HGB BLD-MCNC: 10.8 G/DL (ref 12–16)
HGB BLD-MCNC: 10.9 G/DL (ref 12–16)
HGB BLD-MCNC: 10.9 G/DL (ref 12–16)
HGB BLD-MCNC: 11 G/DL (ref 12–16)
HGB BLD-MCNC: 11.2 G/DL (ref 12–16)
HGB BLD-MCNC: 11.3 G/DL (ref 12–16)
HGB BLD-MCNC: 11.4 G/DL (ref 12–16)
HGB BLD-MCNC: 11.5 G/DL (ref 12–16)
HGB BLD-MCNC: 7.4 G/DL (ref 12–16)
HGB BLD-MCNC: 7.6 G/DL (ref 12–16)
HGB BLD-MCNC: 7.6 G/DL (ref 12–16)
HGB BLD-MCNC: 8 G/DL (ref 12–16)
HGB BLD-MCNC: 8 G/DL (ref 12–16)
HGB BLD-MCNC: 8.3 G/DL (ref 12–16)
HGB BLD-MCNC: 8.4 G/DL (ref 12–16)
HGB BLD-MCNC: 8.5 G/DL (ref 12–16)
HGB BLD-MCNC: 8.6 G/DL (ref 12–16)
HGB BLD-MCNC: 8.6 G/DL (ref 12–16)
HGB BLD-MCNC: 8.7 G/DL (ref 12–16)
HGB BLD-MCNC: 8.7 G/DL (ref 12–16)
HGB BLD-MCNC: 8.8 G/DL (ref 12–16)
HGB BLD-MCNC: 8.8 G/DL (ref 12–16)
HGB BLD-MCNC: 8.9 G/DL (ref 12–16)
HGB BLD-MCNC: 9 G/DL (ref 12–16)
HGB BLD-MCNC: 9.1 G/DL (ref 12–16)
HGB BLD-MCNC: 9.2 G/DL (ref 12–16)
HGB BLD-MCNC: 9.2 G/DL (ref 12–16)
HGB BLD-MCNC: 9.3 G/DL (ref 12–16)
HGB BLD-MCNC: 9.3 G/DL (ref 12–16)
HGB BLD-MCNC: 9.4 G/DL (ref 12–16)
HGB BLD-MCNC: 9.5 G/DL (ref 12–16)
HGB BLD-MCNC: 9.6 G/DL (ref 12–16)
HGB BLD-MCNC: 9.7 G/DL (ref 12–16)
HGB BLD-MCNC: 9.8 G/DL (ref 12–16)
HGB BLD-MCNC: 9.9 G/DL (ref 12–16)
HGB UR QL STRIP: ABNORMAL
HYALINE CASTS #/AREA URNS LPF: 0 /LPF
HYALINE CASTS #/AREA URNS LPF: 10 /LPF
HYALINE CASTS #/AREA URNS LPF: 2 /LPF
HYALINE CASTS #/AREA URNS LPF: 23 /LPF
HYALINE CASTS #/AREA URNS LPF: 444 /LPF
HYALINE CASTS #/AREA URNS LPF: 5 /LPF
HYALINE CASTS #/AREA URNS LPF: 8 /LPF
HYALINE CASTS #/AREA URNS LPF: ABNORMAL /LPF
IMM GRANULOCYTES # BLD AUTO: 0.01 K/UL (ref 0–0.04)
IMM GRANULOCYTES # BLD AUTO: 0.01 K/UL (ref 0–0.04)
IMM GRANULOCYTES # BLD AUTO: 0.02 K/UL (ref 0–0.04)
IMM GRANULOCYTES # BLD AUTO: 0.03 K/UL (ref 0–0.04)
IMM GRANULOCYTES # BLD AUTO: 0.04 K/UL (ref 0–0.04)
IMM GRANULOCYTES # BLD AUTO: 0.05 K/UL (ref 0–0.04)
IMM GRANULOCYTES # BLD AUTO: 0.06 K/UL (ref 0–0.04)
IMM GRANULOCYTES # BLD AUTO: 0.06 K/UL (ref 0–0.04)
IMM GRANULOCYTES # BLD AUTO: 0.07 K/UL (ref 0–0.04)
IMM GRANULOCYTES # BLD AUTO: 0.08 K/UL (ref 0–0.04)
IMM GRANULOCYTES # BLD AUTO: 0.09 K/UL (ref 0–0.04)
IMM GRANULOCYTES # BLD AUTO: 0.1 K/UL (ref 0–0.04)
IMM GRANULOCYTES # BLD AUTO: 0.11 K/UL (ref 0–0.04)
IMM GRANULOCYTES # BLD AUTO: 0.12 K/UL (ref 0–0.04)
IMM GRANULOCYTES # BLD AUTO: 0.12 K/UL (ref 0–0.04)
IMM GRANULOCYTES # BLD AUTO: 0.13 K/UL (ref 0–0.04)
IMM GRANULOCYTES # BLD AUTO: 0.14 K/UL (ref 0–0.04)
IMM GRANULOCYTES # BLD AUTO: 0.15 K/UL (ref 0–0.04)
IMM GRANULOCYTES # BLD AUTO: 0.16 K/UL (ref 0–0.04)
IMM GRANULOCYTES # BLD AUTO: 0.16 K/UL (ref 0–0.04)
IMM GRANULOCYTES # BLD AUTO: 0.17 K/UL (ref 0–0.04)
IMM GRANULOCYTES # BLD AUTO: 0.2 K/UL (ref 0–0.04)
IMM GRANULOCYTES # BLD AUTO: 0.22 K/UL (ref 0–0.04)
IMM GRANULOCYTES # BLD AUTO: 0.22 K/UL (ref 0–0.04)
IMM GRANULOCYTES NFR BLD AUTO: 0.2 % (ref 0–0.5)
IMM GRANULOCYTES NFR BLD AUTO: 0.2 % (ref 0–0.5)
IMM GRANULOCYTES NFR BLD AUTO: 0.3 % (ref 0–0.5)
IMM GRANULOCYTES NFR BLD AUTO: 0.4 % (ref 0–0.5)
IMM GRANULOCYTES NFR BLD AUTO: 0.5 % (ref 0–0.5)
IMM GRANULOCYTES NFR BLD AUTO: 0.6 % (ref 0–0.5)
IMM GRANULOCYTES NFR BLD AUTO: 0.7 % (ref 0–0.5)
IMM GRANULOCYTES NFR BLD AUTO: 0.8 % (ref 0–0.5)
IMM GRANULOCYTES NFR BLD AUTO: 0.8 % (ref 0–0.5)
IMM GRANULOCYTES NFR BLD AUTO: 0.9 % (ref 0–0.5)
IMM GRANULOCYTES NFR BLD AUTO: 1 % (ref 0–0.5)
IMM GRANULOCYTES NFR BLD AUTO: 1.1 % (ref 0–0.5)
IMM GRANULOCYTES NFR BLD AUTO: 1.2 % (ref 0–0.5)
IMM GRANULOCYTES NFR BLD AUTO: 1.2 % (ref 0–0.5)
IMM GRANULOCYTES NFR BLD AUTO: 1.3 % (ref 0–0.5)
IMM GRANULOCYTES NFR BLD AUTO: 1.3 % (ref 0–0.5)
IMM GRANULOCYTES NFR BLD AUTO: 1.5 % (ref 0–0.5)
IMM GRANULOCYTES NFR BLD AUTO: 1.6 % (ref 0–0.5)
INFLUENZA A, MOLECULAR: NEGATIVE
INFLUENZA B, MOLECULAR: NEGATIVE
INR PPP: 1.2 (ref 0.8–1.2)
INR PPP: 1.2 (ref 0.8–1.2)
INR PPP: 1.7
INR PPP: 1.9
INR PPP: 1.9
INR PPP: 2
INTERVENTRICULAR SEPTUM: 1.12 CM (ref 0.6–1.1)
IRON SERPL-MCNC: 30 UG/DL (ref 30–160)
KETONES UR QL STRIP: NEGATIVE
LACTATE SERPL-SCNC: 0.8 MMOL/L (ref 0.5–2.2)
LACTATE SERPL-SCNC: 1 MMOL/L (ref 0.5–1.9)
LACTATE SERPL-SCNC: 1.2 MMOL/L (ref 0.5–1.9)
LACTATE SERPL-SCNC: 1.2 MMOL/L (ref 0.5–1.9)
LACTATE SERPL-SCNC: 1.3 MMOL/L (ref 0.5–1.9)
LACTATE SERPL-SCNC: 1.5 MMOL/L (ref 0.5–1.9)
LACTATE SERPL-SCNC: 2 MMOL/L (ref 0.5–1.9)
LACTATE SERPL-SCNC: 2.2 MMOL/L (ref 0.5–1.9)
LACTATE SERPL-SCNC: 3.3 MMOL/L (ref 0.5–1.9)
LDH SERPL L TO P-CCNC: 337 U/L (ref 110–260)
LEFT INTERNAL DIMENSION IN SYSTOLE: 2.11 CM (ref 2.1–4)
LEFT VENTRICLE DIASTOLIC VOLUME INDEX: 32.83 ML/M2
LEFT VENTRICLE DIASTOLIC VOLUME: 50.23 ML
LEFT VENTRICLE MASS INDEX: 80 G/M2
LEFT VENTRICLE SYSTOLIC VOLUME INDEX: 9.5 ML/M2
LEFT VENTRICLE SYSTOLIC VOLUME: 14.52 ML
LEFT VENTRICULAR INTERNAL DIMENSION IN DIASTOLE: 3.48 CM (ref 3.5–6)
LEFT VENTRICULAR MASS: 122.06 G
LEUKOCYTE ESTERASE UR QL STRIP: ABNORMAL
LYMPHOCYTES # BLD AUTO: 0.4 K/UL (ref 1–4.8)
LYMPHOCYTES # BLD AUTO: 0.4 K/UL (ref 1–4.8)
LYMPHOCYTES # BLD AUTO: 0.5 K/UL (ref 1–4.8)
LYMPHOCYTES # BLD AUTO: 0.5 K/UL (ref 1–4.8)
LYMPHOCYTES # BLD AUTO: 0.6 K/UL (ref 1–4.8)
LYMPHOCYTES # BLD AUTO: 0.7 K/UL (ref 1–4.8)
LYMPHOCYTES # BLD AUTO: 0.8 K/UL (ref 1–4.8)
LYMPHOCYTES # BLD AUTO: 0.9 K/UL (ref 1–4.8)
LYMPHOCYTES # BLD AUTO: 1 K/UL (ref 1–4.8)
LYMPHOCYTES # BLD AUTO: 1.1 K/UL (ref 1–4.8)
LYMPHOCYTES # BLD AUTO: 1.1 K/UL (ref 1–4.8)
LYMPHOCYTES # BLD AUTO: 1.2 K/UL (ref 1–4.8)
LYMPHOCYTES # BLD AUTO: 1.3 K/UL (ref 1–4.8)
LYMPHOCYTES # BLD AUTO: 1.4 K/UL (ref 1–4.8)
LYMPHOCYTES # BLD AUTO: 1.5 K/UL (ref 1–4.8)
LYMPHOCYTES # BLD AUTO: 1.6 K/UL (ref 1–4.8)
LYMPHOCYTES # BLD AUTO: 1.7 K/UL (ref 1–4.8)
LYMPHOCYTES # BLD AUTO: 2 K/UL (ref 1–4.8)
LYMPHOCYTES # BLD AUTO: 2.5 K/UL (ref 1–4.8)
LYMPHOCYTES NFR BLD: 11 % (ref 18–48)
LYMPHOCYTES NFR BLD: 11.4 % (ref 18–48)
LYMPHOCYTES NFR BLD: 11.5 % (ref 18–48)
LYMPHOCYTES NFR BLD: 12.2 % (ref 18–48)
LYMPHOCYTES NFR BLD: 13.3 % (ref 18–48)
LYMPHOCYTES NFR BLD: 13.4 % (ref 18–48)
LYMPHOCYTES NFR BLD: 13.4 % (ref 18–48)
LYMPHOCYTES NFR BLD: 13.7 % (ref 18–48)
LYMPHOCYTES NFR BLD: 14.1 % (ref 18–48)
LYMPHOCYTES NFR BLD: 14.9 % (ref 18–48)
LYMPHOCYTES NFR BLD: 15.9 % (ref 18–48)
LYMPHOCYTES NFR BLD: 16.3 % (ref 18–48)
LYMPHOCYTES NFR BLD: 16.6 % (ref 18–48)
LYMPHOCYTES NFR BLD: 16.8 % (ref 18–48)
LYMPHOCYTES NFR BLD: 17.2 % (ref 18–48)
LYMPHOCYTES NFR BLD: 19.3 % (ref 18–48)
LYMPHOCYTES NFR BLD: 2.3 % (ref 18–48)
LYMPHOCYTES NFR BLD: 22.1 % (ref 18–48)
LYMPHOCYTES NFR BLD: 24.5 % (ref 18–48)
LYMPHOCYTES NFR BLD: 27.2 % (ref 18–48)
LYMPHOCYTES NFR BLD: 27.9 % (ref 18–48)
LYMPHOCYTES NFR BLD: 28.1 % (ref 18–48)
LYMPHOCYTES NFR BLD: 29.3 % (ref 18–48)
LYMPHOCYTES NFR BLD: 3.4 % (ref 18–48)
LYMPHOCYTES NFR BLD: 3.6 % (ref 18–48)
LYMPHOCYTES NFR BLD: 34.7 % (ref 18–48)
LYMPHOCYTES NFR BLD: 5.3 % (ref 18–48)
LYMPHOCYTES NFR BLD: 5.5 % (ref 18–48)
LYMPHOCYTES NFR BLD: 5.5 % (ref 18–48)
LYMPHOCYTES NFR BLD: 5.6 % (ref 18–48)
LYMPHOCYTES NFR BLD: 5.7 % (ref 18–48)
LYMPHOCYTES NFR BLD: 5.8 % (ref 18–48)
LYMPHOCYTES NFR BLD: 5.8 % (ref 18–48)
LYMPHOCYTES NFR BLD: 6.3 % (ref 18–48)
LYMPHOCYTES NFR BLD: 6.7 % (ref 18–48)
LYMPHOCYTES NFR BLD: 6.8 % (ref 18–48)
LYMPHOCYTES NFR BLD: 6.9 % (ref 18–48)
LYMPHOCYTES NFR BLD: 7.1 % (ref 18–48)
LYMPHOCYTES NFR BLD: 8.3 % (ref 18–48)
LYMPHOCYTES NFR BLD: 8.8 % (ref 18–48)
LYMPHOCYTES NFR BLD: 9.1 % (ref 18–48)
LYMPHOCYTES NFR BLD: 9.6 % (ref 18–48)
LYMPHOCYTES NFR BLD: 9.7 % (ref 18–48)
MAGNESIUM SERPL-MCNC: 1.2 MG/DL (ref 1.6–2.6)
MAGNESIUM SERPL-MCNC: 1.3 MG/DL (ref 1.6–2.6)
MAGNESIUM SERPL-MCNC: 1.4 MG/DL (ref 1.6–2.6)
MAGNESIUM SERPL-MCNC: 1.5 MG/DL (ref 1.6–2.6)
MAGNESIUM SERPL-MCNC: 1.6 MG/DL (ref 1.6–2.6)
MAGNESIUM SERPL-MCNC: 1.7 MG/DL (ref 1.6–2.6)
MAGNESIUM SERPL-MCNC: 1.8 MG/DL (ref 1.6–2.6)
MAGNESIUM SERPL-MCNC: 1.9 MG/DL (ref 1.6–2.6)
MAGNESIUM SERPL-MCNC: 2 MG/DL (ref 1.6–2.6)
MAGNESIUM SERPL-MCNC: 2 MG/DL (ref 1.6–2.6)
MAGNESIUM SERPL-MCNC: 2.1 MG/DL (ref 1.6–2.6)
MAGNESIUM SERPL-MCNC: 2.2 MG/DL (ref 1.6–2.6)
MAGNESIUM SERPL-MCNC: 2.3 MG/DL (ref 1.6–2.6)
MAGNESIUM SERPL-MCNC: 2.5 MG/DL (ref 1.6–2.6)
MAGNESIUM SERPL-MCNC: 3 MG/DL (ref 1.6–2.6)
MAGNESIUM SERPL-MCNC: 3 MG/DL (ref 1.6–2.6)
MCH RBC QN AUTO: 31.3 PG (ref 27–31)
MCH RBC QN AUTO: 31.5 PG (ref 27–31)
MCH RBC QN AUTO: 31.7 PG (ref 27–31)
MCH RBC QN AUTO: 31.8 PG (ref 27–31)
MCH RBC QN AUTO: 32.2 PG (ref 27–31)
MCH RBC QN AUTO: 32.4 PG (ref 27–31)
MCH RBC QN AUTO: 32.8 PG (ref 27–31)
MCH RBC QN AUTO: 33 PG (ref 27–31)
MCH RBC QN AUTO: 33.1 PG (ref 27–31)
MCH RBC QN AUTO: 33.1 PG (ref 27–31)
MCH RBC QN AUTO: 33.3 PG (ref 27–31)
MCH RBC QN AUTO: 33.4 PG (ref 27–31)
MCH RBC QN AUTO: 33.5 PG (ref 27–31)
MCH RBC QN AUTO: 33.6 PG (ref 27–31)
MCH RBC QN AUTO: 33.6 PG (ref 27–31)
MCH RBC QN AUTO: 33.7 PG (ref 27–31)
MCH RBC QN AUTO: 33.8 PG (ref 27–31)
MCH RBC QN AUTO: 33.9 PG (ref 27–31)
MCH RBC QN AUTO: 34.5 PG (ref 27–31)
MCH RBC QN AUTO: 34.6 PG (ref 27–31)
MCH RBC QN AUTO: 34.7 PG (ref 27–31)
MCH RBC QN AUTO: 34.7 PG (ref 27–31)
MCH RBC QN AUTO: 34.8 PG (ref 27–31)
MCH RBC QN AUTO: 34.8 PG (ref 27–31)
MCH RBC QN AUTO: 34.9 PG (ref 27–31)
MCH RBC QN AUTO: 35.1 PG (ref 27–31)
MCH RBC QN AUTO: 35.1 PG (ref 27–31)
MCH RBC QN AUTO: 35.2 PG (ref 27–31)
MCH RBC QN AUTO: 35.4 PG (ref 27–31)
MCH RBC QN AUTO: 35.4 PG (ref 27–31)
MCH RBC QN AUTO: 35.6 PG (ref 27–31)
MCH RBC QN AUTO: 35.8 PG (ref 27–31)
MCH RBC QN AUTO: 36.1 PG (ref 27–31)
MCH RBC QN AUTO: 36.2 PG (ref 27–31)
MCH RBC QN AUTO: 36.2 PG (ref 27–31)
MCH RBC QN AUTO: 36.3 PG (ref 27–31)
MCH RBC QN AUTO: 36.4 PG (ref 27–31)
MCH RBC QN AUTO: 36.5 PG (ref 27–31)
MCH RBC QN AUTO: 36.7 PG (ref 27–31)
MCH RBC QN AUTO: 36.8 PG (ref 27–31)
MCH RBC QN AUTO: 36.9 PG (ref 27–31)
MCH RBC QN AUTO: 37.1 PG (ref 27–31)
MCH RBC QN AUTO: 37.1 PG (ref 27–31)
MCH RBC QN AUTO: 37.3 PG (ref 27–31)
MCH RBC QN AUTO: 37.4 PG (ref 27–31)
MCH RBC QN AUTO: 37.4 PG (ref 27–31)
MCH RBC QN AUTO: 37.6 PG (ref 27–31)
MCHC RBC AUTO-ENTMCNC: 29.3 G/DL (ref 32–36)
MCHC RBC AUTO-ENTMCNC: 29.7 G/DL (ref 32–36)
MCHC RBC AUTO-ENTMCNC: 30 G/DL (ref 32–36)
MCHC RBC AUTO-ENTMCNC: 30.1 G/DL (ref 32–36)
MCHC RBC AUTO-ENTMCNC: 30.1 G/DL (ref 32–36)
MCHC RBC AUTO-ENTMCNC: 30.3 G/DL (ref 32–36)
MCHC RBC AUTO-ENTMCNC: 30.3 G/DL (ref 32–36)
MCHC RBC AUTO-ENTMCNC: 30.7 G/DL (ref 32–36)
MCHC RBC AUTO-ENTMCNC: 30.7 G/DL (ref 32–36)
MCHC RBC AUTO-ENTMCNC: 30.8 G/DL (ref 32–36)
MCHC RBC AUTO-ENTMCNC: 30.8 G/DL (ref 32–36)
MCHC RBC AUTO-ENTMCNC: 30.9 G/DL (ref 32–36)
MCHC RBC AUTO-ENTMCNC: 30.9 G/DL (ref 32–36)
MCHC RBC AUTO-ENTMCNC: 31 G/DL (ref 32–36)
MCHC RBC AUTO-ENTMCNC: 31.1 G/DL (ref 32–36)
MCHC RBC AUTO-ENTMCNC: 31.2 G/DL (ref 32–36)
MCHC RBC AUTO-ENTMCNC: 31.3 G/DL (ref 32–36)
MCHC RBC AUTO-ENTMCNC: 31.4 G/DL (ref 32–36)
MCHC RBC AUTO-ENTMCNC: 31.4 G/DL (ref 32–36)
MCHC RBC AUTO-ENTMCNC: 31.5 G/DL (ref 32–36)
MCHC RBC AUTO-ENTMCNC: 31.6 G/DL (ref 32–36)
MCHC RBC AUTO-ENTMCNC: 31.6 G/DL (ref 32–36)
MCHC RBC AUTO-ENTMCNC: 31.7 G/DL (ref 32–36)
MCHC RBC AUTO-ENTMCNC: 31.7 G/DL (ref 32–36)
MCHC RBC AUTO-ENTMCNC: 31.8 G/DL (ref 32–36)
MCHC RBC AUTO-ENTMCNC: 31.9 G/DL (ref 32–36)
MCHC RBC AUTO-ENTMCNC: 32.1 G/DL (ref 32–36)
MCHC RBC AUTO-ENTMCNC: 32.2 G/DL (ref 32–36)
MCHC RBC AUTO-ENTMCNC: 32.3 G/DL (ref 32–36)
MCHC RBC AUTO-ENTMCNC: 32.4 G/DL (ref 32–36)
MCHC RBC AUTO-ENTMCNC: 32.4 G/DL (ref 32–36)
MCHC RBC AUTO-ENTMCNC: 32.5 G/DL (ref 32–36)
MCHC RBC AUTO-ENTMCNC: 32.7 G/DL (ref 32–36)
MCHC RBC AUTO-ENTMCNC: 32.8 G/DL (ref 32–36)
MCHC RBC AUTO-ENTMCNC: 33.1 G/DL (ref 32–36)
MCHC RBC AUTO-ENTMCNC: 33.5 G/DL (ref 32–36)
MCHC RBC AUTO-ENTMCNC: 34.1 G/DL (ref 32–36)
MCHC RBC AUTO-ENTMCNC: 34.2 G/DL (ref 32–36)
MCV RBC AUTO: 101 FL (ref 82–98)
MCV RBC AUTO: 102 FL (ref 82–98)
MCV RBC AUTO: 103 FL (ref 82–98)
MCV RBC AUTO: 104 FL (ref 82–98)
MCV RBC AUTO: 106 FL (ref 82–98)
MCV RBC AUTO: 106 FL (ref 82–98)
MCV RBC AUTO: 107 FL (ref 82–98)
MCV RBC AUTO: 108 FL (ref 82–98)
MCV RBC AUTO: 108 FL (ref 82–98)
MCV RBC AUTO: 109 FL (ref 82–98)
MCV RBC AUTO: 110 FL (ref 82–98)
MCV RBC AUTO: 111 FL (ref 82–98)
MCV RBC AUTO: 112 FL (ref 82–98)
MCV RBC AUTO: 113 FL (ref 82–98)
MCV RBC AUTO: 114 FL (ref 82–98)
MCV RBC AUTO: 115 FL (ref 82–98)
MCV RBC AUTO: 116 FL (ref 82–98)
MCV RBC AUTO: 117 FL (ref 82–98)
MCV RBC AUTO: 118 FL (ref 82–98)
MCV RBC AUTO: 119 FL (ref 82–98)
MCV RBC AUTO: 120 FL (ref 82–98)
MCV RBC AUTO: 122 FL (ref 82–98)
MCV RBC AUTO: 94 FL (ref 82–98)
MCV RBC AUTO: 95 FL (ref 82–98)
MCV RBC AUTO: 98 FL (ref 82–98)
MCV RBC AUTO: 99 FL (ref 82–98)
MCV RBC AUTO: 99 FL (ref 82–98)
MICROSCOPIC COMMENT: ABNORMAL
MONOCYTES # BLD AUTO: 0.1 K/UL (ref 0.3–1)
MONOCYTES # BLD AUTO: 0.3 K/UL (ref 0.3–1)
MONOCYTES # BLD AUTO: 0.4 K/UL (ref 0.3–1)
MONOCYTES # BLD AUTO: 0.5 K/UL (ref 0.3–1)
MONOCYTES # BLD AUTO: 0.6 K/UL (ref 0.3–1)
MONOCYTES # BLD AUTO: 0.7 K/UL (ref 0.3–1)
MONOCYTES # BLD AUTO: 0.8 K/UL (ref 0.3–1)
MONOCYTES # BLD AUTO: 0.8 K/UL (ref 0.3–1)
MONOCYTES # BLD AUTO: 0.9 K/UL (ref 0.3–1)
MONOCYTES # BLD AUTO: 1 K/UL (ref 0.3–1)
MONOCYTES NFR BLD: 0.8 % (ref 4–15)
MONOCYTES NFR BLD: 10 % (ref 4–15)
MONOCYTES NFR BLD: 4.2 % (ref 4–15)
MONOCYTES NFR BLD: 4.3 % (ref 4–15)
MONOCYTES NFR BLD: 4.5 % (ref 4–15)
MONOCYTES NFR BLD: 4.6 % (ref 4–15)
MONOCYTES NFR BLD: 4.7 % (ref 4–15)
MONOCYTES NFR BLD: 4.7 % (ref 4–15)
MONOCYTES NFR BLD: 4.9 % (ref 4–15)
MONOCYTES NFR BLD: 5 % (ref 4–15)
MONOCYTES NFR BLD: 5 % (ref 4–15)
MONOCYTES NFR BLD: 5.1 % (ref 4–15)
MONOCYTES NFR BLD: 5.2 % (ref 4–15)
MONOCYTES NFR BLD: 5.2 % (ref 4–15)
MONOCYTES NFR BLD: 5.5 % (ref 4–15)
MONOCYTES NFR BLD: 5.7 % (ref 4–15)
MONOCYTES NFR BLD: 5.7 % (ref 4–15)
MONOCYTES NFR BLD: 5.8 % (ref 4–15)
MONOCYTES NFR BLD: 5.9 % (ref 4–15)
MONOCYTES NFR BLD: 6.1 % (ref 4–15)
MONOCYTES NFR BLD: 6.3 % (ref 4–15)
MONOCYTES NFR BLD: 6.3 % (ref 4–15)
MONOCYTES NFR BLD: 6.8 % (ref 4–15)
MONOCYTES NFR BLD: 6.9 % (ref 4–15)
MONOCYTES NFR BLD: 6.9 % (ref 4–15)
MONOCYTES NFR BLD: 7.2 % (ref 4–15)
MONOCYTES NFR BLD: 7.3 % (ref 4–15)
MONOCYTES NFR BLD: 7.5 % (ref 4–15)
MONOCYTES NFR BLD: 7.6 % (ref 4–15)
MONOCYTES NFR BLD: 7.7 % (ref 4–15)
MONOCYTES NFR BLD: 7.8 % (ref 4–15)
MONOCYTES NFR BLD: 8.1 % (ref 4–15)
MONOCYTES NFR BLD: 8.3 % (ref 4–15)
MONOCYTES NFR BLD: 9 % (ref 4–15)
MONOCYTES NFR BLD: 9.1 % (ref 4–15)
MONOCYTES NFR BLD: 9.5 % (ref 4–15)
NEUTROPHILS # BLD AUTO: 10.7 K/UL (ref 1.8–7.7)
NEUTROPHILS # BLD AUTO: 10.8 K/UL (ref 1.8–7.7)
NEUTROPHILS # BLD AUTO: 11 K/UL (ref 1.8–7.7)
NEUTROPHILS # BLD AUTO: 11.2 K/UL (ref 1.8–7.7)
NEUTROPHILS # BLD AUTO: 11.3 K/UL (ref 1.8–7.7)
NEUTROPHILS # BLD AUTO: 11.5 K/UL (ref 1.8–7.7)
NEUTROPHILS # BLD AUTO: 12.2 K/UL (ref 1.8–7.7)
NEUTROPHILS # BLD AUTO: 12.9 K/UL (ref 1.8–7.7)
NEUTROPHILS # BLD AUTO: 14.4 K/UL (ref 1.8–7.7)
NEUTROPHILS # BLD AUTO: 14.4 K/UL (ref 1.8–7.7)
NEUTROPHILS # BLD AUTO: 15.1 K/UL (ref 1.8–7.7)
NEUTROPHILS # BLD AUTO: 19.7 K/UL (ref 1.8–7.7)
NEUTROPHILS # BLD AUTO: 2.7 K/UL (ref 1.8–7.7)
NEUTROPHILS # BLD AUTO: 2.8 K/UL (ref 1.8–7.7)
NEUTROPHILS # BLD AUTO: 3.2 K/UL (ref 1.8–7.7)
NEUTROPHILS # BLD AUTO: 3.3 K/UL (ref 1.8–7.7)
NEUTROPHILS # BLD AUTO: 3.4 K/UL (ref 1.8–7.7)
NEUTROPHILS # BLD AUTO: 3.6 K/UL (ref 1.8–7.7)
NEUTROPHILS # BLD AUTO: 3.9 K/UL (ref 1.8–7.7)
NEUTROPHILS # BLD AUTO: 5.3 K/UL (ref 1.8–7.7)
NEUTROPHILS # BLD AUTO: 5.5 K/UL (ref 1.8–7.7)
NEUTROPHILS # BLD AUTO: 5.6 K/UL (ref 1.8–7.7)
NEUTROPHILS # BLD AUTO: 5.7 K/UL (ref 1.8–7.7)
NEUTROPHILS # BLD AUTO: 5.8 K/UL (ref 1.8–7.7)
NEUTROPHILS # BLD AUTO: 6.1 K/UL (ref 1.8–7.7)
NEUTROPHILS # BLD AUTO: 6.7 K/UL (ref 1.8–7.7)
NEUTROPHILS # BLD AUTO: 6.7 K/UL (ref 1.8–7.7)
NEUTROPHILS # BLD AUTO: 6.8 K/UL (ref 1.8–7.7)
NEUTROPHILS # BLD AUTO: 7 K/UL (ref 1.8–7.7)
NEUTROPHILS # BLD AUTO: 7.2 K/UL (ref 1.8–7.7)
NEUTROPHILS # BLD AUTO: 7.3 K/UL (ref 1.8–7.7)
NEUTROPHILS # BLD AUTO: 7.3 K/UL (ref 1.8–7.7)
NEUTROPHILS # BLD AUTO: 7.4 K/UL (ref 1.8–7.7)
NEUTROPHILS # BLD AUTO: 7.7 K/UL (ref 1.8–7.7)
NEUTROPHILS # BLD AUTO: 7.9 K/UL (ref 1.8–7.7)
NEUTROPHILS # BLD AUTO: 8.2 K/UL (ref 1.8–7.7)
NEUTROPHILS # BLD AUTO: 8.4 K/UL (ref 1.8–7.7)
NEUTROPHILS # BLD AUTO: 8.6 K/UL (ref 1.8–7.7)
NEUTROPHILS # BLD AUTO: 8.6 K/UL (ref 1.8–7.7)
NEUTROPHILS # BLD AUTO: 9.1 K/UL (ref 1.8–7.7)
NEUTROPHILS # BLD AUTO: 9.4 K/UL (ref 1.8–7.7)
NEUTROPHILS # BLD AUTO: 9.5 K/UL (ref 1.8–7.7)
NEUTROPHILS # BLD AUTO: 9.7 K/UL (ref 1.8–7.7)
NEUTROPHILS NFR BLD: 53.9 % (ref 38–73)
NEUTROPHILS NFR BLD: 57.8 % (ref 38–73)
NEUTROPHILS NFR BLD: 59 % (ref 38–73)
NEUTROPHILS NFR BLD: 62 % (ref 38–73)
NEUTROPHILS NFR BLD: 62 % (ref 38–73)
NEUTROPHILS NFR BLD: 64.4 % (ref 38–73)
NEUTROPHILS NFR BLD: 64.5 % (ref 38–73)
NEUTROPHILS NFR BLD: 70.6 % (ref 38–73)
NEUTROPHILS NFR BLD: 70.9 % (ref 38–73)
NEUTROPHILS NFR BLD: 71.6 % (ref 38–73)
NEUTROPHILS NFR BLD: 73.9 % (ref 38–73)
NEUTROPHILS NFR BLD: 74.3 % (ref 38–73)
NEUTROPHILS NFR BLD: 74.4 % (ref 38–73)
NEUTROPHILS NFR BLD: 74.7 % (ref 38–73)
NEUTROPHILS NFR BLD: 75.2 % (ref 38–73)
NEUTROPHILS NFR BLD: 75.5 % (ref 38–73)
NEUTROPHILS NFR BLD: 76.3 % (ref 38–73)
NEUTROPHILS NFR BLD: 76.5 % (ref 38–73)
NEUTROPHILS NFR BLD: 76.6 % (ref 38–73)
NEUTROPHILS NFR BLD: 77.3 % (ref 38–73)
NEUTROPHILS NFR BLD: 77.5 % (ref 38–73)
NEUTROPHILS NFR BLD: 79.4 % (ref 38–73)
NEUTROPHILS NFR BLD: 81.5 % (ref 38–73)
NEUTROPHILS NFR BLD: 82 % (ref 38–73)
NEUTROPHILS NFR BLD: 82.7 % (ref 38–73)
NEUTROPHILS NFR BLD: 82.8 % (ref 38–73)
NEUTROPHILS NFR BLD: 83.1 % (ref 38–73)
NEUTROPHILS NFR BLD: 83.9 % (ref 38–73)
NEUTROPHILS NFR BLD: 84.4 % (ref 38–73)
NEUTROPHILS NFR BLD: 85.4 % (ref 38–73)
NEUTROPHILS NFR BLD: 86 % (ref 38–73)
NEUTROPHILS NFR BLD: 86 % (ref 38–73)
NEUTROPHILS NFR BLD: 86.5 % (ref 38–73)
NEUTROPHILS NFR BLD: 87 % (ref 38–73)
NEUTROPHILS NFR BLD: 87 % (ref 38–73)
NEUTROPHILS NFR BLD: 87.1 % (ref 38–73)
NEUTROPHILS NFR BLD: 88.9 % (ref 38–73)
NEUTROPHILS NFR BLD: 88.9 % (ref 38–73)
NEUTROPHILS NFR BLD: 89.9 % (ref 38–73)
NEUTROPHILS NFR BLD: 92.4 % (ref 38–73)
NEUTROPHILS NFR BLD: 94 % (ref 38–73)
NITRITE UR QL STRIP: NEGATIVE
NITRITE UR QL STRIP: POSITIVE
NITRITE UR QL STRIP: POSITIVE
NRBC BLD-RTO: 0 /100 WBC
NRBC BLD-RTO: 1 /100 WBC
NUM UNITS TRANS FFP: NORMAL
NUM UNITS TRANS PACKED RBC: NORMAL
PCO2 BLDA: 41.4 MMHG (ref 35–45)
PCO2 BLDA: 47.9 MMHG (ref 35–45)
PCO2 BLDA: 53.5 MMHG (ref 35–45)
PCO2 BLDA: 53.8 MMHG (ref 35–45)
PH SMN: 7.32 [PH] (ref 7.35–7.45)
PH SMN: 7.36 [PH] (ref 7.35–7.45)
PH SMN: 7.38 [PH] (ref 7.35–7.45)
PH SMN: 7.42 [PH] (ref 7.35–7.45)
PH UR STRIP: 7 [PH] (ref 5–8)
PH UR STRIP: 8 [PH] (ref 5–8)
PHOSPHATE SERPL-MCNC: 2 MG/DL (ref 2.7–4.5)
PHOSPHATE SERPL-MCNC: 2.2 MG/DL (ref 2.7–4.5)
PHOSPHATE SERPL-MCNC: 2.4 MG/DL (ref 2.7–4.5)
PHOSPHATE SERPL-MCNC: 3.2 MG/DL (ref 2.7–4.5)
PHOSPHATE SERPL-MCNC: 3.4 MG/DL (ref 2.7–4.5)
PHOSPHATE SERPL-MCNC: 3.4 MG/DL (ref 2.7–4.5)
PHOSPHATE SERPL-MCNC: 3.5 MG/DL (ref 2.7–4.5)
PHOSPHATE SERPL-MCNC: 3.8 MG/DL (ref 2.7–4.5)
PHOSPHATE SERPL-MCNC: 4.3 MG/DL (ref 2.7–4.5)
PHOSPHATE SERPL-MCNC: 5.4 MG/DL (ref 2.7–4.5)
PHOSPHATE SERPL-MCNC: 6.4 MG/DL (ref 2.7–4.5)
PHOSPHATE SERPL-MCNC: 6.6 MG/DL (ref 2.7–4.5)
PHOSPHATE SERPL-MCNC: 6.7 MG/DL (ref 2.7–4.5)
PHOSPHATE SERPL-MCNC: 6.7 MG/DL (ref 2.7–4.5)
PHOSPHATE SERPL-MCNC: 7.1 MG/DL (ref 2.7–4.5)
PLATELET # BLD AUTO: 112 K/UL (ref 150–450)
PLATELET # BLD AUTO: 113 K/UL (ref 150–450)
PLATELET # BLD AUTO: 115 K/UL (ref 150–450)
PLATELET # BLD AUTO: 117 K/UL (ref 150–450)
PLATELET # BLD AUTO: 121 K/UL (ref 150–450)
PLATELET # BLD AUTO: 121 K/UL (ref 150–450)
PLATELET # BLD AUTO: 124 K/UL (ref 150–450)
PLATELET # BLD AUTO: 128 K/UL (ref 150–450)
PLATELET # BLD AUTO: 128 K/UL (ref 150–450)
PLATELET # BLD AUTO: 130 K/UL (ref 150–450)
PLATELET # BLD AUTO: 136 K/UL (ref 150–450)
PLATELET # BLD AUTO: 138 K/UL (ref 150–450)
PLATELET # BLD AUTO: 140 K/UL (ref 150–450)
PLATELET # BLD AUTO: 141 K/UL (ref 150–450)
PLATELET # BLD AUTO: 142 K/UL (ref 150–450)
PLATELET # BLD AUTO: 142 K/UL (ref 150–450)
PLATELET # BLD AUTO: 143 K/UL (ref 150–450)
PLATELET # BLD AUTO: 144 K/UL (ref 150–450)
PLATELET # BLD AUTO: 145 K/UL (ref 150–450)
PLATELET # BLD AUTO: 145 K/UL (ref 150–450)
PLATELET # BLD AUTO: 148 K/UL (ref 150–450)
PLATELET # BLD AUTO: 151 K/UL (ref 150–450)
PLATELET # BLD AUTO: 152 K/UL (ref 150–450)
PLATELET # BLD AUTO: 154 K/UL (ref 150–450)
PLATELET # BLD AUTO: 155 K/UL (ref 150–450)
PLATELET # BLD AUTO: 156 K/UL (ref 150–450)
PLATELET # BLD AUTO: 156 K/UL (ref 150–450)
PLATELET # BLD AUTO: 162 K/UL (ref 150–450)
PLATELET # BLD AUTO: 167 K/UL (ref 150–450)
PLATELET # BLD AUTO: 168 K/UL (ref 150–450)
PLATELET # BLD AUTO: 181 K/UL (ref 150–450)
PLATELET # BLD AUTO: 184 K/UL (ref 150–450)
PLATELET # BLD AUTO: 185 K/UL (ref 150–450)
PLATELET # BLD AUTO: 189 K/UL (ref 150–450)
PLATELET # BLD AUTO: 195 K/UL (ref 150–450)
PLATELET # BLD AUTO: 200 K/UL (ref 150–450)
PLATELET # BLD AUTO: 202 K/UL (ref 150–450)
PLATELET # BLD AUTO: 202 K/UL (ref 150–450)
PLATELET # BLD AUTO: 210 K/UL (ref 150–450)
PLATELET # BLD AUTO: 214 K/UL (ref 150–450)
PLATELET # BLD AUTO: 217 K/UL (ref 150–450)
PLATELET # BLD AUTO: 220 K/UL (ref 150–450)
PLATELET # BLD AUTO: 284 K/UL (ref 150–450)
PLATELET # BLD AUTO: 300 K/UL (ref 150–450)
PLATELET # BLD AUTO: 67 K/UL (ref 150–450)
PLATELET # BLD AUTO: 74 K/UL (ref 150–450)
PLATELET # BLD AUTO: 76 K/UL (ref 150–450)
PLATELET # BLD AUTO: 83 K/UL (ref 150–450)
PLATELET # BLD AUTO: 86 K/UL (ref 150–450)
PLATELET # BLD AUTO: 92 K/UL (ref 150–450)
PLATELET # BLD AUTO: 92 K/UL (ref 150–450)
PLATELET # BLD AUTO: 94 K/UL (ref 150–450)
PLATELET # BLD AUTO: 97 K/UL (ref 150–450)
PLATELET # BLD AUTO: 98 K/UL (ref 150–450)
PLATELET BLD QL SMEAR: ABNORMAL
PLATELET BLD QL SMEAR: ABNORMAL
PLATELET BLD QL SMEAR: NORMAL
PMV BLD AUTO: 10 FL (ref 9.2–12.9)
PMV BLD AUTO: 10.1 FL (ref 9.2–12.9)
PMV BLD AUTO: 10.2 FL (ref 9.2–12.9)
PMV BLD AUTO: 10.3 FL (ref 9.2–12.9)
PMV BLD AUTO: 10.4 FL (ref 9.2–12.9)
PMV BLD AUTO: 10.5 FL (ref 9.2–12.9)
PMV BLD AUTO: 10.6 FL (ref 9.2–12.9)
PMV BLD AUTO: 10.7 FL (ref 9.2–12.9)
PMV BLD AUTO: 10.7 FL (ref 9.2–12.9)
PMV BLD AUTO: 10.8 FL (ref 9.2–12.9)
PMV BLD AUTO: 10.8 FL (ref 9.2–12.9)
PMV BLD AUTO: 10.9 FL (ref 9.2–12.9)
PMV BLD AUTO: 11 FL (ref 9.2–12.9)
PMV BLD AUTO: 11.1 FL (ref 9.2–12.9)
PMV BLD AUTO: 11.4 FL (ref 9.2–12.9)
PMV BLD AUTO: 11.5 FL (ref 9.2–12.9)
PMV BLD AUTO: 11.8 FL (ref 9.2–12.9)
PMV BLD AUTO: 11.8 FL (ref 9.2–12.9)
PMV BLD AUTO: 12.1 FL (ref 9.2–12.9)
PMV BLD AUTO: 12.2 FL (ref 9.2–12.9)
PMV BLD AUTO: 12.4 FL (ref 9.2–12.9)
PMV BLD AUTO: 12.7 FL (ref 9.2–12.9)
PMV BLD AUTO: 12.8 FL (ref 9.2–12.9)
PMV BLD AUTO: 9.7 FL (ref 9.2–12.9)
PMV BLD AUTO: 9.8 FL (ref 9.2–12.9)
PMV BLD AUTO: 9.9 FL (ref 9.2–12.9)
PO2 BLDA: 40 MMHG (ref 40–60)
PO2 BLDA: 571 MMHG (ref 80–100)
PO2 BLDA: 64 MMHG (ref 40–60)
PO2 BLDA: 79 MMHG (ref 80–100)
POC BE: -1 MMOL/L
POC BE: 2 MMOL/L
POC BE: 4 MMOL/L
POC BE: 6 MMOL/L
POC IONIZED CALCIUM: 1.04 MMOL/L (ref 1.06–1.42)
POC IONIZED CALCIUM: 1.05 MMOL/L (ref 1.06–1.42)
POC IONIZED CALCIUM: 1.06 MMOL/L (ref 1.06–1.42)
POC IONIZED CALCIUM: 1.3 MMOL/L (ref 1.06–1.42)
POC SATURATED O2: 100 % (ref 95–100)
POC SATURATED O2: 69 % (ref 95–100)
POC SATURATED O2: 92 % (ref 95–100)
POC SATURATED O2: 96 % (ref 95–100)
POC TCO2: 26 MMOL/L (ref 24–29)
POC TCO2: 30 MMOL/L (ref 24–29)
POC TCO2: 32 MMOL/L (ref 23–27)
POC TCO2: 32 MMOL/L (ref 23–27)
POCT GLUCOSE: 82 MG/DL (ref 70–110)
POCT GLUCOSE: 83 MG/DL (ref 70–110)
POCT GLUCOSE: 86 MG/DL (ref 70–110)
POIKILOCYTOSIS BLD QL SMEAR: SLIGHT
POIKILOCYTOSIS BLD QL SMEAR: SLIGHT
POTASSIUM BLD-SCNC: 2.9 MMOL/L (ref 3.5–5.1)
POTASSIUM BLD-SCNC: 3.2 MMOL/L (ref 3.5–5.1)
POTASSIUM BLD-SCNC: 3.5 MMOL/L (ref 3.5–5.1)
POTASSIUM BLD-SCNC: 3.5 MMOL/L (ref 3.5–5.1)
POTASSIUM SERPL-SCNC: 1.9 MMOL/L (ref 3.5–5.1)
POTASSIUM SERPL-SCNC: 2.3 MMOL/L (ref 3.5–5.1)
POTASSIUM SERPL-SCNC: 2.6 MMOL/L (ref 3.5–5.1)
POTASSIUM SERPL-SCNC: 2.9 MMOL/L (ref 3.5–5.1)
POTASSIUM SERPL-SCNC: 3.1 MMOL/L (ref 3.5–5.1)
POTASSIUM SERPL-SCNC: 3.2 MMOL/L (ref 3.5–5.1)
POTASSIUM SERPL-SCNC: 3.3 MMOL/L (ref 3.5–5.1)
POTASSIUM SERPL-SCNC: 3.4 MMOL/L (ref 3.5–5.1)
POTASSIUM SERPL-SCNC: 3.5 MMOL/L (ref 3.5–5.1)
POTASSIUM SERPL-SCNC: 3.5 MMOL/L (ref 3.5–5.1)
POTASSIUM SERPL-SCNC: 3.6 MMOL/L (ref 3.5–5.1)
POTASSIUM SERPL-SCNC: 3.7 MMOL/L (ref 3.5–5.1)
POTASSIUM SERPL-SCNC: 3.8 MMOL/L (ref 3.5–5.1)
POTASSIUM SERPL-SCNC: 3.9 MMOL/L (ref 3.5–5.1)
POTASSIUM SERPL-SCNC: 3.9 MMOL/L (ref 3.5–5.1)
POTASSIUM SERPL-SCNC: 4 MMOL/L (ref 3.5–5.1)
POTASSIUM SERPL-SCNC: 4.1 MMOL/L (ref 3.5–5.1)
POTASSIUM SERPL-SCNC: 4.2 MMOL/L (ref 3.5–5.1)
POTASSIUM SERPL-SCNC: 4.3 MMOL/L (ref 3.5–5.1)
POTASSIUM SERPL-SCNC: 4.4 MMOL/L (ref 3.5–5.1)
POTASSIUM SERPL-SCNC: 4.5 MMOL/L (ref 3.5–5.1)
POTASSIUM SERPL-SCNC: 4.6 MMOL/L (ref 3.5–5.1)
POTASSIUM SERPL-SCNC: 4.7 MMOL/L (ref 3.5–5.1)
POTASSIUM SERPL-SCNC: 4.7 MMOL/L (ref 3.5–5.1)
PREALB SERPL-MCNC: 5 MG/DL (ref 20–43)
PROCALCITONIN SERPL IA-MCNC: 0.67 NG/ML
PROCALCITONIN SERPL IA-MCNC: 0.67 NG/ML (ref 0–0.5)
PROCALCITONIN SERPL IA-MCNC: 1.31 NG/ML (ref 0–0.5)
PROT SERPL-MCNC: 4 G/DL (ref 6–8.4)
PROT SERPL-MCNC: 4.1 G/DL (ref 6–8.4)
PROT SERPL-MCNC: 4.2 G/DL (ref 6–8.4)
PROT SERPL-MCNC: 4.2 G/DL (ref 6–8.4)
PROT SERPL-MCNC: 4.3 G/DL (ref 6–8.4)
PROT SERPL-MCNC: 4.3 G/DL (ref 6–8.4)
PROT SERPL-MCNC: 4.5 G/DL (ref 6–8.4)
PROT SERPL-MCNC: 4.5 G/DL (ref 6–8.4)
PROT SERPL-MCNC: 4.6 G/DL (ref 6–8.4)
PROT SERPL-MCNC: 4.8 G/DL (ref 6–8.4)
PROT SERPL-MCNC: 4.9 G/DL (ref 6–8.4)
PROT SERPL-MCNC: 5.3 G/DL (ref 6–8.4)
PROT SERPL-MCNC: 5.6 G/DL (ref 6–8.4)
PROT SERPL-MCNC: 5.6 G/DL (ref 6–8.4)
PROT SERPL-MCNC: 5.7 G/DL (ref 6–8.4)
PROT SERPL-MCNC: 6 G/DL (ref 6–8.4)
PROT SERPL-MCNC: 6.1 G/DL (ref 6–8.4)
PROT SERPL-MCNC: 6.1 G/DL (ref 6–8.4)
PROT SERPL-MCNC: 6.2 G/DL (ref 6–8.4)
PROT SERPL-MCNC: 6.5 G/DL (ref 6–8.4)
PROT UR QL STRIP: ABNORMAL
PROTHROMBIN TIME: 12.8 SEC (ref 9–12.5)
PROTHROMBIN TIME: 12.8 SEC (ref 9–12.5)
PROTHROMBIN TIME: 19.2 SEC (ref 11.4–13.7)
PROTHROMBIN TIME: 20.5 SEC (ref 11.4–13.7)
PROTHROMBIN TIME: 21 SEC (ref 11.4–13.7)
PROTHROMBIN TIME: 21.5 SEC (ref 11.4–13.7)
RA PRESSURE: 3 MMHG
RBC # BLD AUTO: 2.12 M/UL (ref 4–5.4)
RBC # BLD AUTO: 2.26 M/UL (ref 4–5.4)
RBC # BLD AUTO: 2.27 M/UL (ref 4–5.4)
RBC # BLD AUTO: 2.3 M/UL (ref 4–5.4)
RBC # BLD AUTO: 2.33 M/UL (ref 4–5.4)
RBC # BLD AUTO: 2.36 M/UL (ref 4–5.4)
RBC # BLD AUTO: 2.41 M/UL (ref 4–5.4)
RBC # BLD AUTO: 2.42 M/UL (ref 4–5.4)
RBC # BLD AUTO: 2.45 M/UL (ref 4–5.4)
RBC # BLD AUTO: 2.48 M/UL (ref 4–5.4)
RBC # BLD AUTO: 2.5 M/UL (ref 4–5.4)
RBC # BLD AUTO: 2.51 M/UL (ref 4–5.4)
RBC # BLD AUTO: 2.52 M/UL (ref 4–5.4)
RBC # BLD AUTO: 2.55 M/UL (ref 4–5.4)
RBC # BLD AUTO: 2.57 M/UL (ref 4–5.4)
RBC # BLD AUTO: 2.58 M/UL (ref 4–5.4)
RBC # BLD AUTO: 2.6 M/UL (ref 4–5.4)
RBC # BLD AUTO: 2.61 M/UL (ref 4–5.4)
RBC # BLD AUTO: 2.63 M/UL (ref 4–5.4)
RBC # BLD AUTO: 2.63 M/UL (ref 4–5.4)
RBC # BLD AUTO: 2.67 M/UL (ref 4–5.4)
RBC # BLD AUTO: 2.67 M/UL (ref 4–5.4)
RBC # BLD AUTO: 2.69 M/UL (ref 4–5.4)
RBC # BLD AUTO: 2.7 M/UL (ref 4–5.4)
RBC # BLD AUTO: 2.71 M/UL (ref 4–5.4)
RBC # BLD AUTO: 2.71 M/UL (ref 4–5.4)
RBC # BLD AUTO: 2.74 M/UL (ref 4–5.4)
RBC # BLD AUTO: 2.76 M/UL (ref 4–5.4)
RBC # BLD AUTO: 2.85 M/UL (ref 4–5.4)
RBC # BLD AUTO: 2.85 M/UL (ref 4–5.4)
RBC # BLD AUTO: 2.86 M/UL (ref 4–5.4)
RBC # BLD AUTO: 2.87 M/UL (ref 4–5.4)
RBC # BLD AUTO: 2.87 M/UL (ref 4–5.4)
RBC # BLD AUTO: 2.89 M/UL (ref 4–5.4)
RBC # BLD AUTO: 2.89 M/UL (ref 4–5.4)
RBC # BLD AUTO: 2.9 M/UL (ref 4–5.4)
RBC # BLD AUTO: 2.91 M/UL (ref 4–5.4)
RBC # BLD AUTO: 2.92 M/UL (ref 4–5.4)
RBC # BLD AUTO: 2.93 M/UL (ref 4–5.4)
RBC # BLD AUTO: 2.97 M/UL (ref 4–5.4)
RBC # BLD AUTO: 2.98 M/UL (ref 4–5.4)
RBC # BLD AUTO: 2.99 M/UL (ref 4–5.4)
RBC # BLD AUTO: 3.02 M/UL (ref 4–5.4)
RBC # BLD AUTO: 3.05 M/UL (ref 4–5.4)
RBC # BLD AUTO: 3.05 M/UL (ref 4–5.4)
RBC # BLD AUTO: 3.09 M/UL (ref 4–5.4)
RBC # BLD AUTO: 3.22 M/UL (ref 4–5.4)
RBC # BLD AUTO: 3.23 M/UL (ref 4–5.4)
RBC # BLD AUTO: 3.49 M/UL (ref 4–5.4)
RBC #/AREA URNS HPF: 1 /HPF (ref 0–4)
RBC #/AREA URNS HPF: 10 /HPF (ref 0–4)
RBC #/AREA URNS HPF: 10 /HPF (ref 0–4)
RBC #/AREA URNS HPF: 22 /HPF (ref 0–4)
RBC #/AREA URNS HPF: 35 /HPF (ref 0–4)
RBC #/AREA URNS HPF: >100 /HPF (ref 0–4)
RBC #/AREA URNS HPF: ABNORMAL /HPF (ref 0–4)
SAMPLE: ABNORMAL
SARS-COV-2 RDRP RESP QL NAA+PROBE: NEGATIVE
SARS-COV-2 RDRP RESP QL NAA+PROBE: POSITIVE
SATURATED IRON: 25 % (ref 20–50)
SODIUM BLD-SCNC: 133 MMOL/L (ref 136–145)
SODIUM BLD-SCNC: 135 MMOL/L (ref 136–145)
SODIUM BLD-SCNC: 138 MMOL/L (ref 136–145)
SODIUM BLD-SCNC: 141 MMOL/L (ref 136–145)
SODIUM SERPL-SCNC: 125 MMOL/L (ref 136–145)
SODIUM SERPL-SCNC: 126 MMOL/L (ref 136–145)
SODIUM SERPL-SCNC: 131 MMOL/L (ref 136–145)
SODIUM SERPL-SCNC: 132 MMOL/L (ref 136–145)
SODIUM SERPL-SCNC: 133 MMOL/L (ref 136–145)
SODIUM SERPL-SCNC: 133 MMOL/L (ref 136–145)
SODIUM SERPL-SCNC: 134 MMOL/L (ref 136–145)
SODIUM SERPL-SCNC: 135 MMOL/L (ref 136–145)
SODIUM SERPL-SCNC: 135 MMOL/L (ref 136–145)
SODIUM SERPL-SCNC: 136 MMOL/L (ref 136–145)
SODIUM SERPL-SCNC: 137 MMOL/L (ref 136–145)
SODIUM SERPL-SCNC: 138 MMOL/L (ref 136–145)
SODIUM SERPL-SCNC: 139 MMOL/L (ref 136–145)
SODIUM SERPL-SCNC: 140 MMOL/L (ref 136–145)
SODIUM SERPL-SCNC: 141 MMOL/L (ref 136–145)
SODIUM SERPL-SCNC: 142 MMOL/L (ref 136–145)
SODIUM SERPL-SCNC: 143 MMOL/L (ref 136–145)
SP GR UR STRIP: 1.01 (ref 1–1.03)
SP GR UR STRIP: 1.02 (ref 1–1.03)
SPECIMEN SOURCE: NORMAL
SPECIMEN SOURCE: NORMAL
SQUAMOUS #/AREA URNS HPF: 0 /HPF
SQUAMOUS #/AREA URNS HPF: 14 /HPF
SQUAMOUS #/AREA URNS HPF: 2 /HPF
SQUAMOUS #/AREA URNS HPF: 3 /HPF
STONE ANALYSIS IR-IMP: NORMAL
T3FREE SERPL-MCNC: 1.6 PG/ML (ref 2–4.4)
T4 FREE SERPL-MCNC: 0.94 NG/DL (ref 0.71–1.51)
TOTAL IRON BINDING CAPACITY: 118 UG/DL (ref 250–450)
TRANSFERRIN SERPL-MCNC: 84 MG/DL (ref 200–375)
TRIGL SERPL-MCNC: 96 MG/DL (ref 30–150)
TROPONIN I SERPL DL<=0.01 NG/ML-MCNC: 0.02 NG/ML (ref 0–0.03)
TROPONIN I SERPL DL<=0.01 NG/ML-MCNC: 0.03 NG/ML
TROPONIN I SERPL DL<=0.01 NG/ML-MCNC: 0.04 NG/ML
TROPONIN I SERPL HS-MCNC: 13.5 PG/ML (ref 0–14.9)
TROPONIN I SERPL HS-MCNC: 16.3 PG/ML (ref 0–14.9)
TSH SERPL DL<=0.005 MIU/L-ACNC: 2.16 UIU/ML (ref 0.34–5.6)
TSH SERPL DL<=0.005 MIU/L-ACNC: 2.82 UIU/ML (ref 0.34–5.6)
TSH SERPL DL<=0.005 MIU/L-ACNC: 3.81 UIU/ML (ref 0.34–5.6)
TSH SERPL DL<=0.005 MIU/L-ACNC: 5.71 UIU/ML (ref 0.34–5.6)
URN SPEC COLLECT METH UR: ABNORMAL
UROBILINOGEN UR STRIP-ACNC: NEGATIVE EU/DL
VANCOMYCIN SERPL-MCNC: 12.4 UG/ML
VANCOMYCIN SERPL-MCNC: 13 UG/ML
VANCOMYCIN SERPL-MCNC: 13.2 UG/ML
VANCOMYCIN SERPL-MCNC: 13.6 UG/ML
VANCOMYCIN SERPL-MCNC: 15.6 UG/ML
VANCOMYCIN SERPL-MCNC: 17.8 UG/ML
VANCOMYCIN SERPL-MCNC: 18.4 UG/ML
VANCOMYCIN SERPL-MCNC: 21.2 UG/ML
VANCOMYCIN SERPL-MCNC: 8.1 UG/ML
VIT B1 BLD-SCNC: 269.3 NMOL/L (ref 66.5–200)
VIT B12 SERPL-MCNC: 1073 PG/ML (ref 210–950)
VIT B12 SERPL-MCNC: 113 PG/ML (ref 210–950)
VIT B12 SERPL-MCNC: 652 PG/ML (ref 210–950)
VIT B6 SERPL-MCNC: 1.8 UG/L (ref 3.4–65.2)
WBC # BLD AUTO: 10.04 K/UL (ref 3.9–12.7)
WBC # BLD AUTO: 10.06 K/UL (ref 3.9–12.7)
WBC # BLD AUTO: 10.34 K/UL (ref 3.9–12.7)
WBC # BLD AUTO: 10.68 K/UL (ref 3.9–12.7)
WBC # BLD AUTO: 10.98 K/UL (ref 3.9–12.7)
WBC # BLD AUTO: 11.14 K/UL (ref 3.9–12.7)
WBC # BLD AUTO: 11.3 K/UL (ref 3.9–12.7)
WBC # BLD AUTO: 11.36 K/UL (ref 3.9–12.7)
WBC # BLD AUTO: 11.44 K/UL (ref 3.9–12.7)
WBC # BLD AUTO: 12.42 K/UL (ref 3.9–12.7)
WBC # BLD AUTO: 12.77 K/UL (ref 3.9–12.7)
WBC # BLD AUTO: 12.98 K/UL (ref 3.9–12.7)
WBC # BLD AUTO: 13.01 K/UL (ref 3.9–12.7)
WBC # BLD AUTO: 13.08 K/UL (ref 3.9–12.7)
WBC # BLD AUTO: 13.12 K/UL (ref 3.9–12.7)
WBC # BLD AUTO: 13.13 K/UL (ref 3.9–12.7)
WBC # BLD AUTO: 14.03 K/UL (ref 3.9–12.7)
WBC # BLD AUTO: 14.83 K/UL (ref 3.9–12.7)
WBC # BLD AUTO: 16.22 K/UL (ref 3.9–12.7)
WBC # BLD AUTO: 16.22 K/UL (ref 3.9–12.7)
WBC # BLD AUTO: 16.66 K/UL (ref 3.9–12.7)
WBC # BLD AUTO: 17.39 K/UL (ref 3.9–12.7)
WBC # BLD AUTO: 21.26 K/UL (ref 3.9–12.7)
WBC # BLD AUTO: 4.15 K/UL (ref 3.9–12.7)
WBC # BLD AUTO: 4.28 K/UL (ref 3.9–12.7)
WBC # BLD AUTO: 4.56 K/UL (ref 3.9–12.7)
WBC # BLD AUTO: 4.81 K/UL (ref 3.9–12.7)
WBC # BLD AUTO: 4.97 K/UL (ref 3.9–12.7)
WBC # BLD AUTO: 5.15 K/UL (ref 3.9–12.7)
WBC # BLD AUTO: 5.38 K/UL (ref 3.9–12.7)
WBC # BLD AUTO: 5.53 K/UL (ref 3.9–12.7)
WBC # BLD AUTO: 5.55 K/UL (ref 3.9–12.7)
WBC # BLD AUTO: 5.57 K/UL (ref 3.9–12.7)
WBC # BLD AUTO: 5.68 K/UL (ref 3.9–12.7)
WBC # BLD AUTO: 5.73 K/UL (ref 3.9–12.7)
WBC # BLD AUTO: 5.83 K/UL (ref 3.9–12.7)
WBC # BLD AUTO: 6.66 K/UL (ref 3.9–12.7)
WBC # BLD AUTO: 6.78 K/UL (ref 3.9–12.7)
WBC # BLD AUTO: 7.12 K/UL (ref 3.9–12.7)
WBC # BLD AUTO: 7.14 K/UL (ref 3.9–12.7)
WBC # BLD AUTO: 7.29 K/UL (ref 3.9–12.7)
WBC # BLD AUTO: 7.64 K/UL (ref 3.9–12.7)
WBC # BLD AUTO: 8.03 K/UL (ref 3.9–12.7)
WBC # BLD AUTO: 8.21 K/UL (ref 3.9–12.7)
WBC # BLD AUTO: 8.28 K/UL (ref 3.9–12.7)
WBC # BLD AUTO: 8.55 K/UL (ref 3.9–12.7)
WBC # BLD AUTO: 8.63 K/UL (ref 3.9–12.7)
WBC # BLD AUTO: 8.79 K/UL (ref 3.9–12.7)
WBC # BLD AUTO: 8.81 K/UL (ref 3.9–12.7)
WBC # BLD AUTO: 8.85 K/UL (ref 3.9–12.7)
WBC # BLD AUTO: 9.01 K/UL (ref 3.9–12.7)
WBC # BLD AUTO: 9.04 K/UL (ref 3.9–12.7)
WBC # BLD AUTO: 9.35 K/UL (ref 3.9–12.7)
WBC # BLD AUTO: 9.47 K/UL (ref 3.9–12.7)
WBC # BLD AUTO: 9.65 K/UL (ref 3.9–12.7)
WBC # BLD AUTO: 9.75 K/UL (ref 3.9–12.7)
WBC #/AREA URNS HPF: 50 /HPF (ref 0–5)
WBC #/AREA URNS HPF: 75 /HPF (ref 0–5)
WBC #/AREA URNS HPF: >100 /HPF (ref 0–5)
WBC CLUMPS URNS QL MICRO: ABNORMAL

## 2022-01-01 PROCEDURE — 3008F BODY MASS INDEX DOCD: CPT | Mod: CPTII,S$GLB,, | Performed by: INTERNAL MEDICINE

## 2022-01-01 PROCEDURE — 36000707: Performed by: UROLOGY

## 2022-01-01 PROCEDURE — 25000003 PHARM REV CODE 250: Performed by: INTERNAL MEDICINE

## 2022-01-01 PROCEDURE — 27000671 HC TUBING MICROBORE EXT: Performed by: ANESTHESIOLOGY

## 2022-01-01 PROCEDURE — 85025 COMPLETE CBC W/AUTO DIFF WBC: CPT | Performed by: INTERNAL MEDICINE

## 2022-01-01 PROCEDURE — 93010 EKG 12-LEAD: ICD-10-PCS | Mod: ,,, | Performed by: GENERAL PRACTICE

## 2022-01-01 PROCEDURE — 27201423 OPTIME MED/SURG SUP & DEVICES STERILE SUPPLY: Performed by: SURGERY

## 2022-01-01 PROCEDURE — 71046 X-RAY EXAM CHEST 2 VIEWS: CPT | Mod: TC

## 2022-01-01 PROCEDURE — 25000003 PHARM REV CODE 250: Performed by: UROLOGY

## 2022-01-01 PROCEDURE — 25000003 PHARM REV CODE 250: Performed by: STUDENT IN AN ORGANIZED HEALTH CARE EDUCATION/TRAINING PROGRAM

## 2022-01-01 PROCEDURE — 94761 N-INVAS EAR/PLS OXIMETRY MLT: CPT

## 2022-01-01 PROCEDURE — 80053 COMPREHEN METABOLIC PANEL: CPT | Performed by: INTERNAL MEDICINE

## 2022-01-01 PROCEDURE — 80053 COMPREHEN METABOLIC PANEL: CPT | Performed by: PHYSICIAN ASSISTANT

## 2022-01-01 PROCEDURE — 63600175 PHARM REV CODE 636 W HCPCS: Performed by: UROLOGY

## 2022-01-01 PROCEDURE — 99900035 HC TECH TIME PER 15 MIN (STAT)

## 2022-01-01 PROCEDURE — 87086 URINE CULTURE/COLONY COUNT: CPT | Performed by: INTERNAL MEDICINE

## 2022-01-01 PROCEDURE — 83735 ASSAY OF MAGNESIUM: CPT | Performed by: INTERNAL MEDICINE

## 2022-01-01 PROCEDURE — C1769 GUIDE WIRE: HCPCS | Performed by: SURGERY

## 2022-01-01 PROCEDURE — 90935 HEMODIALYSIS ONE EVALUATION: CPT

## 2022-01-01 PROCEDURE — 3074F PR MOST RECENT SYSTOLIC BLOOD PRESSURE < 130 MM HG: ICD-10-PCS | Mod: CPTII,S$GLB,, | Performed by: INTERNAL MEDICINE

## 2022-01-01 PROCEDURE — 96365 THER/PROPH/DIAG IV INF INIT: CPT

## 2022-01-01 PROCEDURE — A4217 STERILE WATER/SALINE, 500 ML: HCPCS | Performed by: INTERNAL MEDICINE

## 2022-01-01 PROCEDURE — 99231 PR SUBSEQUENT HOSPITAL CARE,LEVL I: ICD-10-PCS | Mod: ,,, | Performed by: INTERNAL MEDICINE

## 2022-01-01 PROCEDURE — 3066F NEPHROPATHY DOC TX: CPT | Mod: CPTII,S$GLB,, | Performed by: INTERNAL MEDICINE

## 2022-01-01 PROCEDURE — 25000003 PHARM REV CODE 250: Performed by: NURSE ANESTHETIST, CERTIFIED REGISTERED

## 2022-01-01 PROCEDURE — 99214 OFFICE O/P EST MOD 30 MIN: CPT | Mod: S$GLB,,, | Performed by: INTERNAL MEDICINE

## 2022-01-01 PROCEDURE — 1111F DSCHRG MED/CURRENT MED MERGE: CPT | Mod: CPTII,S$GLB,, | Performed by: UROLOGY

## 2022-01-01 PROCEDURE — 63600175 PHARM REV CODE 636 W HCPCS: Performed by: FAMILY MEDICINE

## 2022-01-01 PROCEDURE — 20000000 HC ICU ROOM

## 2022-01-01 PROCEDURE — 63700000 PHARM REV CODE 250 ALT 637 W/O HCPCS: Performed by: UROLOGY

## 2022-01-01 PROCEDURE — 63600175 PHARM REV CODE 636 W HCPCS: Performed by: INTERNAL MEDICINE

## 2022-01-01 PROCEDURE — 1159F PR MEDICATION LIST DOCUMENTED IN MEDICAL RECORD: ICD-10-PCS | Mod: CPTII,S$GLB,, | Performed by: UROLOGY

## 2022-01-01 PROCEDURE — 52332 CYSTOSCOPY AND TREATMENT: CPT | Mod: RT,,, | Performed by: UROLOGY

## 2022-01-01 PROCEDURE — 99285 EMERGENCY DEPT VISIT HI MDM: CPT | Mod: 25

## 2022-01-01 PROCEDURE — 27201423 OPTIME MED/SURG SUP & DEVICES STERILE SUPPLY: Performed by: UROLOGY

## 2022-01-01 PROCEDURE — 93010 ELECTROCARDIOGRAM REPORT: CPT | Mod: ,,, | Performed by: SPECIALIST

## 2022-01-01 PROCEDURE — 80048 BASIC METABOLIC PNL TOTAL CA: CPT | Performed by: INTERNAL MEDICINE

## 2022-01-01 PROCEDURE — 82040 ASSAY OF SERUM ALBUMIN: CPT | Performed by: INTERNAL MEDICINE

## 2022-01-01 PROCEDURE — 85027 COMPLETE CBC AUTOMATED: CPT | Performed by: INTERNAL MEDICINE

## 2022-01-01 PROCEDURE — 63600175 PHARM REV CODE 636 W HCPCS: Performed by: EMERGENCY MEDICINE

## 2022-01-01 PROCEDURE — 3078F DIAST BP <80 MM HG: CPT | Mod: S$GLB,,, | Performed by: INTERNAL MEDICINE

## 2022-01-01 PROCEDURE — 63600175 PHARM REV CODE 636 W HCPCS: Mod: JG | Performed by: INTERNAL MEDICINE

## 2022-01-01 PROCEDURE — 83605 ASSAY OF LACTIC ACID: CPT | Performed by: EMERGENCY MEDICINE

## 2022-01-01 PROCEDURE — 96374 THER/PROPH/DIAG INJ IV PUSH: CPT

## 2022-01-01 PROCEDURE — 99231 SBSQ HOSP IP/OBS SF/LOW 25: CPT | Mod: ,,, | Performed by: INTERNAL MEDICINE

## 2022-01-01 PROCEDURE — G0378 HOSPITAL OBSERVATION PER HR: HCPCS

## 2022-01-01 PROCEDURE — 90471 IMMUNIZATION ADMIN: CPT | Performed by: EMERGENCY MEDICINE

## 2022-01-01 PROCEDURE — 93010 EKG 12-LEAD: ICD-10-PCS | Mod: ,,, | Performed by: INTERNAL MEDICINE

## 2022-01-01 PROCEDURE — 85610 PROTHROMBIN TIME: CPT | Performed by: NURSE PRACTITIONER

## 2022-01-01 PROCEDURE — 85025 COMPLETE CBC W/AUTO DIFF WBC: CPT | Performed by: UROLOGY

## 2022-01-01 PROCEDURE — 99223 PR INITIAL HOSPITAL CARE,LEVL III: ICD-10-PCS | Mod: ,,, | Performed by: INTERNAL MEDICINE

## 2022-01-01 PROCEDURE — 37000009 HC ANESTHESIA EA ADD 15 MINS: Performed by: UROLOGY

## 2022-01-01 PROCEDURE — 94640 AIRWAY INHALATION TREATMENT: CPT | Mod: XB

## 2022-01-01 PROCEDURE — 94760 N-INVAS EAR/PLS OXIMETRY 1: CPT

## 2022-01-01 PROCEDURE — 36415 COLL VENOUS BLD VENIPUNCTURE: CPT | Performed by: INTERNAL MEDICINE

## 2022-01-01 PROCEDURE — 12000002 HC ACUTE/MED SURGE SEMI-PRIVATE ROOM

## 2022-01-01 PROCEDURE — 21400001 HC TELEMETRY ROOM

## 2022-01-01 PROCEDURE — 97162 PT EVAL MOD COMPLEX 30 MIN: CPT

## 2022-01-01 PROCEDURE — 84443 ASSAY THYROID STIM HORMONE: CPT | Performed by: EMERGENCY MEDICINE

## 2022-01-01 PROCEDURE — 87077 CULTURE AEROBIC IDENTIFY: CPT | Performed by: INTERNAL MEDICINE

## 2022-01-01 PROCEDURE — 83605 ASSAY OF LACTIC ACID: CPT | Performed by: INTERNAL MEDICINE

## 2022-01-01 PROCEDURE — 94799 UNLISTED PULMONARY SVC/PX: CPT

## 2022-01-01 PROCEDURE — A4216 STERILE WATER/SALINE, 10 ML: HCPCS | Performed by: INTERNAL MEDICINE

## 2022-01-01 PROCEDURE — 25500020 PHARM REV CODE 255: Performed by: UROLOGY

## 2022-01-01 PROCEDURE — 36415 COLL VENOUS BLD VENIPUNCTURE: CPT | Performed by: STUDENT IN AN ORGANIZED HEALTH CARE EDUCATION/TRAINING PROGRAM

## 2022-01-01 PROCEDURE — 99223 1ST HOSP IP/OBS HIGH 75: CPT | Mod: ,,, | Performed by: INTERNAL MEDICINE

## 2022-01-01 PROCEDURE — 86706 HEP B SURFACE ANTIBODY: CPT | Performed by: INTERNAL MEDICINE

## 2022-01-01 PROCEDURE — 97535 SELF CARE MNGMENT TRAINING: CPT

## 2022-01-01 PROCEDURE — 99449 PR INTERPROF, PHONE/INTERNET/EHR, CONSULT, >= 31 MINS: ICD-10-PCS | Mod: ,,, | Performed by: UROLOGY

## 2022-01-01 PROCEDURE — 99214 PR OFFICE/OUTPT VISIT, EST, LEVL IV, 30-39 MIN: ICD-10-PCS | Mod: S$GLB,,, | Performed by: INTERNAL MEDICINE

## 2022-01-01 PROCEDURE — 87086 URINE CULTURE/COLONY COUNT: CPT | Performed by: UROLOGY

## 2022-01-01 PROCEDURE — 97161 PT EVAL LOW COMPLEX 20 MIN: CPT

## 2022-01-01 PROCEDURE — 80202 ASSAY OF VANCOMYCIN: CPT | Performed by: INTERNAL MEDICINE

## 2022-01-01 PROCEDURE — 25000003 PHARM REV CODE 250

## 2022-01-01 PROCEDURE — B4185 PARENTERAL SOL 10 GM LIPIDS: HCPCS | Performed by: INTERNAL MEDICINE

## 2022-01-01 PROCEDURE — U0002 COVID-19 LAB TEST NON-CDC: HCPCS | Performed by: EMERGENCY MEDICINE

## 2022-01-01 PROCEDURE — 51860 REPAIR OF BLADDER WOUND: CPT | Mod: 58,51,, | Performed by: STUDENT IN AN ORGANIZED HEALTH CARE EDUCATION/TRAINING PROGRAM

## 2022-01-01 PROCEDURE — 81001 URINALYSIS AUTO W/SCOPE: CPT | Performed by: INTERNAL MEDICINE

## 2022-01-01 PROCEDURE — 27000221 HC OXYGEN, UP TO 24 HOURS

## 2022-01-01 PROCEDURE — 77080 DXA BONE DENSITY AXIAL: CPT | Mod: TC,PO

## 2022-01-01 PROCEDURE — 82140 ASSAY OF AMMONIA: CPT | Performed by: INTERNAL MEDICINE

## 2022-01-01 PROCEDURE — 99999 PR PBB SHADOW E&M-EST. PATIENT-LVL V: ICD-10-PCS | Mod: PBBFAC,,, | Performed by: UROLOGY

## 2022-01-01 PROCEDURE — 52332 CYSTOSCOPY AND TREATMENT: CPT | Mod: 51,RT,, | Performed by: UROLOGY

## 2022-01-01 PROCEDURE — 25000003 PHARM REV CODE 250: Performed by: HOSPITALIST

## 2022-01-01 PROCEDURE — 85025 COMPLETE CBC W/AUTO DIFF WBC: CPT | Performed by: HOSPITALIST

## 2022-01-01 PROCEDURE — 87077 CULTURE AEROBIC IDENTIFY: CPT | Performed by: HOSPITALIST

## 2022-01-01 PROCEDURE — 52332 PR CYSTOSCOPY,INSERT URETERAL STENT: ICD-10-PCS | Mod: RT,,, | Performed by: UROLOGY

## 2022-01-01 PROCEDURE — 63600175 PHARM REV CODE 636 W HCPCS: Performed by: SURGERY

## 2022-01-01 PROCEDURE — 25000003 PHARM REV CODE 250: Performed by: NURSE PRACTITIONER

## 2022-01-01 PROCEDURE — 37000009 HC ANESTHESIA EA ADD 15 MINS: Performed by: SURGERY

## 2022-01-01 PROCEDURE — 63600175 PHARM REV CODE 636 W HCPCS: Performed by: STUDENT IN AN ORGANIZED HEALTH CARE EDUCATION/TRAINING PROGRAM

## 2022-01-01 PROCEDURE — 93005 ELECTROCARDIOGRAM TRACING: CPT | Performed by: SPECIALIST

## 2022-01-01 PROCEDURE — 87040 BLOOD CULTURE FOR BACTERIA: CPT | Performed by: EMERGENCY MEDICINE

## 2022-01-01 PROCEDURE — 82728 ASSAY OF FERRITIN: CPT | Performed by: NURSE PRACTITIONER

## 2022-01-01 PROCEDURE — 86850 RBC ANTIBODY SCREEN: CPT | Performed by: EMERGENCY MEDICINE

## 2022-01-01 PROCEDURE — 84484 ASSAY OF TROPONIN QUANT: CPT | Mod: 91 | Performed by: FAMILY MEDICINE

## 2022-01-01 PROCEDURE — 63600175 PHARM REV CODE 636 W HCPCS: Performed by: HOSPITALIST

## 2022-01-01 PROCEDURE — 63600175 PHARM REV CODE 636 W HCPCS: Performed by: NURSE PRACTITIONER

## 2022-01-01 PROCEDURE — 85018 HEMOGLOBIN: CPT | Performed by: INTERNAL MEDICINE

## 2022-01-01 PROCEDURE — 99223 PR INITIAL HOSPITAL CARE,LEVL III: ICD-10-PCS | Mod: 57,,, | Performed by: SURGERY

## 2022-01-01 PROCEDURE — 3008F PR BODY MASS INDEX (BMI) DOCUMENTED: ICD-10-PCS | Mod: S$GLB,,, | Performed by: INTERNAL MEDICINE

## 2022-01-01 PROCEDURE — 96367 TX/PROPH/DG ADDL SEQ IV INF: CPT

## 2022-01-01 PROCEDURE — 93306 TTE W/DOPPLER COMPLETE: CPT

## 2022-01-01 PROCEDURE — 81001 URINALYSIS AUTO W/SCOPE: CPT | Performed by: EMERGENCY MEDICINE

## 2022-01-01 PROCEDURE — 25000003 PHARM REV CODE 250: Performed by: EMERGENCY MEDICINE

## 2022-01-01 PROCEDURE — 99900031 HC PATIENT EDUCATION (STAT)

## 2022-01-01 PROCEDURE — 83735 ASSAY OF MAGNESIUM: CPT | Performed by: STUDENT IN AN ORGANIZED HEALTH CARE EDUCATION/TRAINING PROGRAM

## 2022-01-01 PROCEDURE — 80053 COMPREHEN METABOLIC PANEL: CPT | Performed by: EMERGENCY MEDICINE

## 2022-01-01 PROCEDURE — 63600175 PHARM REV CODE 636 W HCPCS: Performed by: NURSE ANESTHETIST, CERTIFIED REGISTERED

## 2022-01-01 PROCEDURE — C2617 STENT, NON-COR, TEM W/O DEL: HCPCS | Performed by: UROLOGY

## 2022-01-01 PROCEDURE — 93010 ELECTROCARDIOGRAM REPORT: CPT | Mod: ,,, | Performed by: INTERNAL MEDICINE

## 2022-01-01 PROCEDURE — 83615 LACTATE (LD) (LDH) ENZYME: CPT | Performed by: NURSE PRACTITIONER

## 2022-01-01 PROCEDURE — 85025 COMPLETE CBC W/AUTO DIFF WBC: CPT | Performed by: EMERGENCY MEDICINE

## 2022-01-01 PROCEDURE — 85610 PROTHROMBIN TIME: CPT | Performed by: EMERGENCY MEDICINE

## 2022-01-01 PROCEDURE — 86850 RBC ANTIBODY SCREEN: CPT | Performed by: INTERNAL MEDICINE

## 2022-01-01 PROCEDURE — 99233 SBSQ HOSP IP/OBS HIGH 50: CPT | Mod: ,,, | Performed by: UROLOGY

## 2022-01-01 PROCEDURE — 83735 ASSAY OF MAGNESIUM: CPT | Performed by: EMERGENCY MEDICINE

## 2022-01-01 PROCEDURE — 51700 PR IRRIGATION, BLADDER: ICD-10-PCS | Mod: ,,, | Performed by: STUDENT IN AN ORGANIZED HEALTH CARE EDUCATION/TRAINING PROGRAM

## 2022-01-01 PROCEDURE — 71000033 HC RECOVERY, INTIAL HOUR: Performed by: UROLOGY

## 2022-01-01 PROCEDURE — 82550 ASSAY OF CK (CPK): CPT | Performed by: NURSE PRACTITIONER

## 2022-01-01 PROCEDURE — 36000706: Performed by: UROLOGY

## 2022-01-01 PROCEDURE — 25000003 PHARM REV CODE 250: Performed by: SURGERY

## 2022-01-01 PROCEDURE — 25000242 PHARM REV CODE 250 ALT 637 W/ HCPCS: Performed by: INTERNAL MEDICINE

## 2022-01-01 PROCEDURE — D9220A PRA ANESTHESIA: Mod: ANES,,, | Performed by: ANESTHESIOLOGY

## 2022-01-01 PROCEDURE — 87186 SC STD MICRODIL/AGAR DIL: CPT | Performed by: HOSPITALIST

## 2022-01-01 PROCEDURE — 93010 EKG 12-LEAD: ICD-10-PCS | Mod: ,,, | Performed by: SPECIALIST

## 2022-01-01 PROCEDURE — 84484 ASSAY OF TROPONIN QUANT: CPT | Performed by: EMERGENCY MEDICINE

## 2022-01-01 PROCEDURE — 93308 TTE F-UP OR LMTD: CPT | Mod: 26,,, | Performed by: INTERNAL MEDICINE

## 2022-01-01 PROCEDURE — 25000003 PHARM REV CODE 250: Performed by: PHYSICIAN ASSISTANT

## 2022-01-01 PROCEDURE — 96368 THER/DIAG CONCURRENT INF: CPT

## 2022-01-01 PROCEDURE — 1159F MED LIST DOCD IN RCRD: CPT | Mod: CPTII,S$GLB,, | Performed by: UROLOGY

## 2022-01-01 PROCEDURE — 99215 OFFICE O/P EST HI 40 MIN: CPT | Performed by: INTERNAL MEDICINE

## 2022-01-01 PROCEDURE — 3078F DIAST BP <80 MM HG: CPT | Mod: CPTII,S$GLB,, | Performed by: UROLOGY

## 2022-01-01 PROCEDURE — 96376 TX/PRO/DX INJ SAME DRUG ADON: CPT

## 2022-01-01 PROCEDURE — 86920 COMPATIBILITY TEST SPIN: CPT | Performed by: STUDENT IN AN ORGANIZED HEALTH CARE EDUCATION/TRAINING PROGRAM

## 2022-01-01 PROCEDURE — 80053 COMPREHEN METABOLIC PANEL: CPT | Performed by: STUDENT IN AN ORGANIZED HEALTH CARE EDUCATION/TRAINING PROGRAM

## 2022-01-01 PROCEDURE — 84466 ASSAY OF TRANSFERRIN: CPT | Performed by: STUDENT IN AN ORGANIZED HEALTH CARE EDUCATION/TRAINING PROGRAM

## 2022-01-01 PROCEDURE — 83605 ASSAY OF LACTIC ACID: CPT | Mod: 91 | Performed by: FAMILY MEDICINE

## 2022-01-01 PROCEDURE — 83605 ASSAY OF LACTIC ACID: CPT | Performed by: PHYSICIAN ASSISTANT

## 2022-01-01 PROCEDURE — 27000673 HC TUBING BLOOD Y: Performed by: NURSE ANESTHETIST, CERTIFIED REGISTERED

## 2022-01-01 PROCEDURE — C1758 CATHETER, URETERAL: HCPCS | Performed by: UROLOGY

## 2022-01-01 PROCEDURE — 84134 ASSAY OF PREALBUMIN: CPT | Performed by: INTERNAL MEDICINE

## 2022-01-01 PROCEDURE — 99999 PR PBB SHADOW E&M-EST. PATIENT-LVL V: CPT | Mod: PBBFAC,,, | Performed by: UROLOGY

## 2022-01-01 PROCEDURE — 1101F PT FALLS ASSESS-DOCD LE1/YR: CPT | Mod: CPTII,S$GLB,, | Performed by: INTERNAL MEDICINE

## 2022-01-01 PROCEDURE — 85025 COMPLETE CBC W/AUTO DIFF WBC: CPT | Mod: 91 | Performed by: NURSE PRACTITIONER

## 2022-01-01 PROCEDURE — 27000656 HC EYE GOGGLES: Performed by: NURSE ANESTHETIST, CERTIFIED REGISTERED

## 2022-01-01 PROCEDURE — 25000003 PHARM REV CODE 250: Performed by: ANESTHESIOLOGY

## 2022-01-01 PROCEDURE — 36415 COLL VENOUS BLD VENIPUNCTURE: CPT | Performed by: EMERGENCY MEDICINE

## 2022-01-01 PROCEDURE — 3008F PR BODY MASS INDEX (BMI) DOCUMENTED: ICD-10-PCS | Mod: CPTII,S$GLB,, | Performed by: INTERNAL MEDICINE

## 2022-01-01 PROCEDURE — 1159F MED LIST DOCD IN RCRD: CPT | Mod: S$GLB,,, | Performed by: INTERNAL MEDICINE

## 2022-01-01 PROCEDURE — 83735 ASSAY OF MAGNESIUM: CPT | Mod: 91 | Performed by: INTERNAL MEDICINE

## 2022-01-01 PROCEDURE — 95819 EEG AWAKE AND ASLEEP: CPT

## 2022-01-01 PROCEDURE — 3078F PR MOST RECENT DIASTOLIC BLOOD PRESSURE < 80 MM HG: ICD-10-PCS | Mod: S$GLB,,, | Performed by: INTERNAL MEDICINE

## 2022-01-01 PROCEDURE — 87040 BLOOD CULTURE FOR BACTERIA: CPT | Performed by: INTERNAL MEDICINE

## 2022-01-01 PROCEDURE — 37000008 HC ANESTHESIA 1ST 15 MINUTES: Performed by: SURGERY

## 2022-01-01 PROCEDURE — 36000706: Performed by: SURGERY

## 2022-01-01 PROCEDURE — 36000709 HC OR TIME LEV III EA ADD 15 MIN: Performed by: SURGERY

## 2022-01-01 PROCEDURE — 3008F BODY MASS INDEX DOCD: CPT | Mod: CPTII,S$GLB,, | Performed by: UROLOGY

## 2022-01-01 PROCEDURE — 1160F PR REVIEW ALL MEDS BY PRESCRIBER/CLIN PHARMACIST DOCUMENTED: ICD-10-PCS | Mod: CPTII,S$GLB,, | Performed by: UROLOGY

## 2022-01-01 PROCEDURE — 36903 INTRO CATH DIALYSIS CIRCUIT: CPT | Performed by: SURGERY

## 2022-01-01 PROCEDURE — 1111F PR DISCHARGE MEDS RECONCILED W/ CURRENT OUTPATIENT MED LIST: ICD-10-PCS | Mod: CPTII,S$GLB,, | Performed by: UROLOGY

## 2022-01-01 PROCEDURE — 84145 PROCALCITONIN (PCT): CPT | Performed by: EMERGENCY MEDICINE

## 2022-01-01 PROCEDURE — 82607 VITAMIN B-12: CPT | Performed by: INTERNAL MEDICINE

## 2022-01-01 PROCEDURE — 83605 ASSAY OF LACTIC ACID: CPT | Mod: 91 | Performed by: EMERGENCY MEDICINE

## 2022-01-01 PROCEDURE — 97530 THERAPEUTIC ACTIVITIES: CPT

## 2022-01-01 PROCEDURE — P9016 RBC LEUKOCYTES REDUCED: HCPCS | Performed by: INTERNAL MEDICINE

## 2022-01-01 PROCEDURE — 96375 TX/PRO/DX INJ NEW DRUG ADDON: CPT

## 2022-01-01 PROCEDURE — 3074F SYST BP LT 130 MM HG: CPT | Mod: S$GLB,,, | Performed by: INTERNAL MEDICINE

## 2022-01-01 PROCEDURE — U0002 COVID-19 LAB TEST NON-CDC: HCPCS | Performed by: HOSPITALIST

## 2022-01-01 PROCEDURE — 99900104 DSU ONLY-NO CHARGE-EA ADD'L HR (STAT): Performed by: UROLOGY

## 2022-01-01 PROCEDURE — 3066F PR DOCUMENTATION OF TREATMENT FOR NEPHROPATHY: ICD-10-PCS | Mod: CPTII,S$GLB,, | Performed by: INTERNAL MEDICINE

## 2022-01-01 PROCEDURE — 1160F RVW MEDS BY RX/DR IN RCRD: CPT | Mod: S$GLB,,, | Performed by: INTERNAL MEDICINE

## 2022-01-01 PROCEDURE — 97116 GAIT TRAINING THERAPY: CPT

## 2022-01-01 PROCEDURE — 77063 BREAST TOMOSYNTHESIS BI: CPT | Mod: TC,PO

## 2022-01-01 PROCEDURE — 80048 BASIC METABOLIC PNL TOTAL CA: CPT | Performed by: HOSPITALIST

## 2022-01-01 PROCEDURE — 81001 URINALYSIS AUTO W/SCOPE: CPT | Performed by: UROLOGY

## 2022-01-01 PROCEDURE — 83880 ASSAY OF NATRIURETIC PEPTIDE: CPT | Performed by: EMERGENCY MEDICINE

## 2022-01-01 PROCEDURE — 36000708 HC OR TIME LEV III 1ST 15 MIN: Performed by: STUDENT IN AN ORGANIZED HEALTH CARE EDUCATION/TRAINING PROGRAM

## 2022-01-01 PROCEDURE — 84425 ASSAY OF VITAMIN B-1: CPT | Performed by: INTERNAL MEDICINE

## 2022-01-01 PROCEDURE — 3074F PR MOST RECENT SYSTOLIC BLOOD PRESSURE < 130 MM HG: ICD-10-PCS | Mod: CPTII,S$GLB,, | Performed by: UROLOGY

## 2022-01-01 PROCEDURE — 96361 HYDRATE IV INFUSION ADD-ON: CPT

## 2022-01-01 PROCEDURE — 84132 ASSAY OF SERUM POTASSIUM: CPT | Performed by: INTERNAL MEDICINE

## 2022-01-01 PROCEDURE — 1111F DSCHRG MED/CURRENT MED MERGE: CPT | Mod: CPTII,S$GLB,, | Performed by: INTERNAL MEDICINE

## 2022-01-01 PROCEDURE — 63600175 PHARM REV CODE 636 W HCPCS: Performed by: ANESTHESIOLOGY

## 2022-01-01 PROCEDURE — 99223 1ST HOSP IP/OBS HIGH 75: CPT | Mod: ,,, | Performed by: UROLOGY

## 2022-01-01 PROCEDURE — 25000242 PHARM REV CODE 250 ALT 637 W/ HCPCS: Performed by: NURSE PRACTITIONER

## 2022-01-01 PROCEDURE — 36415 COLL VENOUS BLD VENIPUNCTURE: CPT | Performed by: NURSE PRACTITIONER

## 2022-01-01 PROCEDURE — 82746 ASSAY OF FOLIC ACID SERUM: CPT | Performed by: STUDENT IN AN ORGANIZED HEALTH CARE EDUCATION/TRAINING PROGRAM

## 2022-01-01 PROCEDURE — 99284 EMERGENCY DEPT VISIT MOD MDM: CPT | Mod: 25

## 2022-01-01 PROCEDURE — 27202177 HC INTRODUCER, TRACHEAL TUBE: Performed by: ANESTHESIOLOGY

## 2022-01-01 PROCEDURE — 99214 OFFICE O/P EST MOD 30 MIN: CPT | Mod: S$GLB,,, | Performed by: UROLOGY

## 2022-01-01 PROCEDURE — 1101F PR PT FALLS ASSESS DOC 0-1 FALLS W/OUT INJ PAST YR: ICD-10-PCS | Mod: CPTII,S$GLB,, | Performed by: INTERNAL MEDICINE

## 2022-01-01 PROCEDURE — 27000207 HC ISOLATION

## 2022-01-01 PROCEDURE — 99900103 DSU ONLY-NO CHARGE-INITIAL HR (STAT): Performed by: UROLOGY

## 2022-01-01 PROCEDURE — 99232 SBSQ HOSP IP/OBS MODERATE 35: CPT | Mod: ,,, | Performed by: INTERNAL MEDICINE

## 2022-01-01 PROCEDURE — 3074F SYST BP LT 130 MM HG: CPT | Mod: CPTII,S$GLB,, | Performed by: INTERNAL MEDICINE

## 2022-01-01 PROCEDURE — 99232 PR SUBSEQUENT HOSPITAL CARE,LEVL II: ICD-10-PCS | Mod: ,,, | Performed by: INTERNAL MEDICINE

## 2022-01-01 PROCEDURE — 76937 US GUIDE VASCULAR ACCESS: CPT

## 2022-01-01 PROCEDURE — 99233 PR SUBSEQUENT HOSPITAL CARE,LEVL III: ICD-10-PCS | Mod: 25,,, | Performed by: STUDENT IN AN ORGANIZED HEALTH CARE EDUCATION/TRAINING PROGRAM

## 2022-01-01 PROCEDURE — 63600175 PHARM REV CODE 636 W HCPCS

## 2022-01-01 PROCEDURE — 84481 FREE ASSAY (FT-3): CPT | Performed by: INTERNAL MEDICINE

## 2022-01-01 PROCEDURE — 37000009 HC ANESTHESIA EA ADD 15 MINS: Performed by: STUDENT IN AN ORGANIZED HEALTH CARE EDUCATION/TRAINING PROGRAM

## 2022-01-01 PROCEDURE — 3066F PR DOCUMENTATION OF TREATMENT FOR NEPHROPATHY: ICD-10-PCS | Mod: S$GLB,,, | Performed by: INTERNAL MEDICINE

## 2022-01-01 PROCEDURE — 36000708 HC OR TIME LEV III 1ST 15 MIN: Performed by: SURGERY

## 2022-01-01 PROCEDURE — 99223 PR INITIAL HOSPITAL CARE,LEVL III: ICD-10-PCS | Mod: ,,, | Performed by: UROLOGY

## 2022-01-01 PROCEDURE — 86704 HEP B CORE ANTIBODY TOTAL: CPT | Performed by: INTERNAL MEDICINE

## 2022-01-01 PROCEDURE — C1751 CATH, INF, PER/CENT/MIDLINE: HCPCS

## 2022-01-01 PROCEDURE — 84100 ASSAY OF PHOSPHORUS: CPT | Performed by: INTERNAL MEDICINE

## 2022-01-01 PROCEDURE — 1125F AMNT PAIN NOTED PAIN PRSNT: CPT | Mod: CPTII,S$GLB,, | Performed by: INTERNAL MEDICINE

## 2022-01-01 PROCEDURE — 87040 BLOOD CULTURE FOR BACTERIA: CPT | Mod: 59 | Performed by: EMERGENCY MEDICINE

## 2022-01-01 PROCEDURE — 84145 PROCALCITONIN (PCT): CPT | Performed by: HOSPITALIST

## 2022-01-01 PROCEDURE — D9220A PRA ANESTHESIA: ICD-10-PCS | Mod: ANES,,, | Performed by: ANESTHESIOLOGY

## 2022-01-01 PROCEDURE — 87086 URINE CULTURE/COLONY COUNT: CPT | Performed by: EMERGENCY MEDICINE

## 2022-01-01 PROCEDURE — 1160F RVW MEDS BY RX/DR IN RCRD: CPT | Mod: CPTII,S$GLB,, | Performed by: INTERNAL MEDICINE

## 2022-01-01 PROCEDURE — C1750 CATH, HEMODIALYSIS,LONG-TERM: HCPCS | Performed by: SURGERY

## 2022-01-01 PROCEDURE — 36430 TRANSFUSION BLD/BLD COMPNT: CPT

## 2022-01-01 PROCEDURE — 1160F RVW MEDS BY RX/DR IN RCRD: CPT | Mod: CPTII,S$GLB,, | Performed by: UROLOGY

## 2022-01-01 PROCEDURE — 63700000 PHARM REV CODE 250 ALT 637 W/O HCPCS: Performed by: INTERNAL MEDICINE

## 2022-01-01 PROCEDURE — 82962 GLUCOSE BLOOD TEST: CPT | Performed by: UROLOGY

## 2022-01-01 PROCEDURE — 36410 VNPNXR 3YR/> PHY/QHP DX/THER: CPT

## 2022-01-01 PROCEDURE — 36415 COLL VENOUS BLD VENIPUNCTURE: CPT | Performed by: HOSPITALIST

## 2022-01-01 PROCEDURE — 36000709 HC OR TIME LEV III EA ADD 15 MIN: Performed by: STUDENT IN AN ORGANIZED HEALTH CARE EDUCATION/TRAINING PROGRAM

## 2022-01-01 PROCEDURE — 1126F PR PAIN SEVERITY QUANTIFIED, NO PAIN PRESENT: ICD-10-PCS | Mod: CPTII,S$GLB,, | Performed by: INTERNAL MEDICINE

## 2022-01-01 PROCEDURE — 3008F PR BODY MASS INDEX (BMI) DOCUMENTED: ICD-10-PCS | Mod: CPTII,S$GLB,, | Performed by: UROLOGY

## 2022-01-01 PROCEDURE — 80069 RENAL FUNCTION PANEL: CPT | Performed by: INTERNAL MEDICINE

## 2022-01-01 PROCEDURE — 1125F AMNT PAIN NOTED PAIN PRSNT: CPT | Mod: S$GLB,,, | Performed by: INTERNAL MEDICINE

## 2022-01-01 PROCEDURE — 84439 ASSAY OF FREE THYROXINE: CPT | Performed by: INTERNAL MEDICINE

## 2022-01-01 PROCEDURE — 1160F PR REVIEW ALL MEDS BY PRESCRIBER/CLIN PHARMACIST DOCUMENTED: ICD-10-PCS | Mod: S$GLB,,, | Performed by: INTERNAL MEDICINE

## 2022-01-01 PROCEDURE — 83605 ASSAY OF LACTIC ACID: CPT | Performed by: NURSE PRACTITIONER

## 2022-01-01 PROCEDURE — S0030 INJECTION, METRONIDAZOLE: HCPCS | Performed by: INTERNAL MEDICINE

## 2022-01-01 PROCEDURE — 93306 ECHO (CUPID ONLY): ICD-10-PCS | Mod: 26,,, | Performed by: GENERAL PRACTICE

## 2022-01-01 PROCEDURE — 80048 BASIC METABOLIC PNL TOTAL CA: CPT | Performed by: STUDENT IN AN ORGANIZED HEALTH CARE EDUCATION/TRAINING PROGRAM

## 2022-01-01 PROCEDURE — 3066F NEPHROPATHY DOC TX: CPT | Mod: CPTII,S$GLB,, | Performed by: UROLOGY

## 2022-01-01 PROCEDURE — 85610 PROTHROMBIN TIME: CPT | Performed by: INTERNAL MEDICINE

## 2022-01-01 PROCEDURE — 80053 COMPREHEN METABOLIC PANEL: CPT | Mod: 91 | Performed by: INTERNAL MEDICINE

## 2022-01-01 PROCEDURE — 71000039 HC RECOVERY, EACH ADD'L HOUR: Performed by: UROLOGY

## 2022-01-01 PROCEDURE — 27000675 HC TUBING MICRODRIP: Performed by: ANESTHESIOLOGY

## 2022-01-01 PROCEDURE — 36415 COLL VENOUS BLD VENIPUNCTURE: CPT | Performed by: UROLOGY

## 2022-01-01 PROCEDURE — 94640 AIRWAY INHALATION TREATMENT: CPT

## 2022-01-01 PROCEDURE — 1111F PR DISCHARGE MEDS RECONCILED W/ CURRENT OUTPATIENT MED LIST: ICD-10-PCS | Mod: CPTII,S$GLB,, | Performed by: INTERNAL MEDICINE

## 2022-01-01 PROCEDURE — 3078F PR MOST RECENT DIASTOLIC BLOOD PRESSURE < 80 MM HG: ICD-10-PCS | Mod: CPTII,S$GLB,, | Performed by: UROLOGY

## 2022-01-01 PROCEDURE — 93005 ELECTROCARDIOGRAM TRACING: CPT | Performed by: INTERNAL MEDICINE

## 2022-01-01 PROCEDURE — 25500020 PHARM REV CODE 255: Performed by: EMERGENCY MEDICINE

## 2022-01-01 PROCEDURE — 85025 COMPLETE CBC W/AUTO DIFF WBC: CPT | Mod: 91 | Performed by: INTERNAL MEDICINE

## 2022-01-01 PROCEDURE — 96366 THER/PROPH/DIAG IV INF ADDON: CPT

## 2022-01-01 PROCEDURE — 3074F SYST BP LT 130 MM HG: CPT | Mod: CPTII,S$GLB,, | Performed by: UROLOGY

## 2022-01-01 PROCEDURE — 27202107 HC XP QUATRO SENSOR: Performed by: NURSE ANESTHETIST, CERTIFIED REGISTERED

## 2022-01-01 PROCEDURE — 93306 TTE W/DOPPLER COMPLETE: CPT | Mod: 26,,, | Performed by: GENERAL PRACTICE

## 2022-01-01 PROCEDURE — 85730 THROMBOPLASTIN TIME PARTIAL: CPT | Performed by: NURSE PRACTITIONER

## 2022-01-01 PROCEDURE — 90715 TDAP VACCINE 7 YRS/> IM: CPT | Performed by: EMERGENCY MEDICINE

## 2022-01-01 PROCEDURE — 44143 PARTIAL REMOVAL OF COLON: CPT | Mod: ,,, | Performed by: SURGERY

## 2022-01-01 PROCEDURE — 87502 INFLUENZA DNA AMP PROBE: CPT | Performed by: EMERGENCY MEDICINE

## 2022-01-01 PROCEDURE — 74176 CT ABD & PELVIS W/O CONTRAST: CPT | Mod: TC

## 2022-01-01 PROCEDURE — 83880 ASSAY OF NATRIURETIC PEPTIDE: CPT | Performed by: NURSE PRACTITIONER

## 2022-01-01 PROCEDURE — 84478 ASSAY OF TRIGLYCERIDES: CPT | Performed by: INTERNAL MEDICINE

## 2022-01-01 PROCEDURE — 85027 COMPLETE CBC AUTOMATED: CPT | Performed by: NURSE PRACTITIONER

## 2022-01-01 PROCEDURE — D9220A PRA ANESTHESIA: ICD-10-PCS | Mod: CRNA,,, | Performed by: NURSE ANESTHETIST, CERTIFIED REGISTERED

## 2022-01-01 PROCEDURE — 1159F PR MEDICATION LIST DOCUMENTED IN MEDICAL RECORD: ICD-10-PCS | Mod: CPTII,S$GLB,, | Performed by: INTERNAL MEDICINE

## 2022-01-01 PROCEDURE — 85025 COMPLETE CBC W/AUTO DIFF WBC: CPT | Performed by: PHYSICIAN ASSISTANT

## 2022-01-01 PROCEDURE — 81001 URINALYSIS AUTO W/SCOPE: CPT | Performed by: HOSPITALIST

## 2022-01-01 PROCEDURE — 1126F AMNT PAIN NOTED NONE PRSNT: CPT | Mod: CPTII,S$GLB,, | Performed by: INTERNAL MEDICINE

## 2022-01-01 PROCEDURE — 25500020 PHARM REV CODE 255: Performed by: PHYSICIAN ASSISTANT

## 2022-01-01 PROCEDURE — 74420 UROGRAPHY RTRGR +-KUB: CPT | Mod: 26,,, | Performed by: UROLOGY

## 2022-01-01 PROCEDURE — P9016 RBC LEUKOCYTES REDUCED: HCPCS | Performed by: STUDENT IN AN ORGANIZED HEALTH CARE EDUCATION/TRAINING PROGRAM

## 2022-01-01 PROCEDURE — 80202 ASSAY OF VANCOMYCIN: CPT | Performed by: HOSPITALIST

## 2022-01-01 PROCEDURE — 84443 ASSAY THYROID STIM HORMONE: CPT | Performed by: INTERNAL MEDICINE

## 2022-01-01 PROCEDURE — 27202107 HC XP QUATRO SENSOR: Performed by: ANESTHESIOLOGY

## 2022-01-01 PROCEDURE — 80100014 HC HEMODIALYSIS 1:1

## 2022-01-01 PROCEDURE — 1111F PR DISCHARGE MEDS RECONCILED W/ CURRENT OUTPATIENT MED LIST: ICD-10-PCS | Mod: S$GLB,,, | Performed by: INTERNAL MEDICINE

## 2022-01-01 PROCEDURE — C1769 GUIDE WIRE: HCPCS | Performed by: UROLOGY

## 2022-01-01 PROCEDURE — 63600175 PHARM REV CODE 636 W HCPCS: Performed by: PHYSICIAN ASSISTANT

## 2022-01-01 PROCEDURE — 52352 PR CYSTO/URETERO/PYELOSCOPY, CALCULUS TX: ICD-10-PCS | Mod: RT,,, | Performed by: UROLOGY

## 2022-01-01 PROCEDURE — U0002 COVID-19 LAB TEST NON-CDC: HCPCS | Performed by: INTERNAL MEDICINE

## 2022-01-01 PROCEDURE — 88309 TISSUE EXAM BY PATHOLOGIST: CPT | Mod: TC

## 2022-01-01 PROCEDURE — 36000707: Performed by: SURGERY

## 2022-01-01 PROCEDURE — 84100 ASSAY OF PHOSPHORUS: CPT | Performed by: NURSE PRACTITIONER

## 2022-01-01 PROCEDURE — 71000015 HC POSTOP RECOV 1ST HR: Performed by: UROLOGY

## 2022-01-01 PROCEDURE — 37000008 HC ANESTHESIA 1ST 15 MINUTES: Performed by: UROLOGY

## 2022-01-01 PROCEDURE — 99223 1ST HOSP IP/OBS HIGH 75: CPT | Mod: ,,, | Performed by: STUDENT IN AN ORGANIZED HEALTH CARE EDUCATION/TRAINING PROGRAM

## 2022-01-01 PROCEDURE — 82607 VITAMIN B-12: CPT | Performed by: STUDENT IN AN ORGANIZED HEALTH CARE EDUCATION/TRAINING PROGRAM

## 2022-01-01 PROCEDURE — 27100098 HC SPACER

## 2022-01-01 PROCEDURE — 82746 ASSAY OF FOLIC ACID SERUM: CPT | Performed by: INTERNAL MEDICINE

## 2022-01-01 PROCEDURE — 80048 BASIC METABOLIC PNL TOTAL CA: CPT | Performed by: NURSE PRACTITIONER

## 2022-01-01 PROCEDURE — 83735 ASSAY OF MAGNESIUM: CPT | Performed by: NURSE PRACTITIONER

## 2022-01-01 PROCEDURE — 51700 IRRIGATION OF BLADDER: CPT | Mod: ,,, | Performed by: STUDENT IN AN ORGANIZED HEALTH CARE EDUCATION/TRAINING PROGRAM

## 2022-01-01 PROCEDURE — 97165 OT EVAL LOW COMPLEX 30 MIN: CPT

## 2022-01-01 PROCEDURE — 1111F DSCHRG MED/CURRENT MED MERGE: CPT | Mod: S$GLB,,, | Performed by: INTERNAL MEDICINE

## 2022-01-01 PROCEDURE — 3074F PR MOST RECENT SYSTOLIC BLOOD PRESSURE < 130 MM HG: ICD-10-PCS | Mod: S$GLB,,, | Performed by: INTERNAL MEDICINE

## 2022-01-01 PROCEDURE — 99211 OFF/OP EST MAY X REQ PHY/QHP: CPT | Mod: 25

## 2022-01-01 PROCEDURE — 87147 CULTURE TYPE IMMUNOLOGIC: CPT | Performed by: INTERNAL MEDICINE

## 2022-01-01 PROCEDURE — 99215 PR OFFICE/OUTPT VISIT, EST, LEVL V, 40-54 MIN: ICD-10-PCS | Mod: ,,, | Performed by: INTERNAL MEDICINE

## 2022-01-01 PROCEDURE — D9220A PRA ANESTHESIA: Mod: CRNA,,, | Performed by: NURSE ANESTHETIST, CERTIFIED REGISTERED

## 2022-01-01 PROCEDURE — 85610 PROTHROMBIN TIME: CPT | Performed by: FAMILY MEDICINE

## 2022-01-01 PROCEDURE — 44139 MOBILIZATION OF COLON: CPT | Mod: ,,, | Performed by: SURGERY

## 2022-01-01 PROCEDURE — G0257 UNSCHED DIALYSIS ESRD PT HOS: HCPCS

## 2022-01-01 PROCEDURE — 85025 COMPLETE CBC W/AUTO DIFF WBC: CPT | Performed by: STUDENT IN AN ORGANIZED HEALTH CARE EDUCATION/TRAINING PROGRAM

## 2022-01-01 PROCEDURE — 84207 ASSAY OF VITAMIN B-6: CPT | Performed by: INTERNAL MEDICINE

## 2022-01-01 PROCEDURE — 99214 PR OFFICE/OUTPT VISIT, EST, LEVL IV, 30-39 MIN: ICD-10-PCS | Mod: S$PBB,,, | Performed by: INTERNAL MEDICINE

## 2022-01-01 PROCEDURE — 1125F PR PAIN SEVERITY QUANTIFIED, PAIN PRESENT: ICD-10-PCS | Mod: CPTII,S$GLB,, | Performed by: INTERNAL MEDICINE

## 2022-01-01 PROCEDURE — 85025 COMPLETE CBC W/AUTO DIFF WBC: CPT | Performed by: NURSE PRACTITIONER

## 2022-01-01 PROCEDURE — 85651 RBC SED RATE NONAUTOMATED: CPT | Performed by: NURSE PRACTITIONER

## 2022-01-01 PROCEDURE — 87186 SC STD MICRODIL/AGAR DIL: CPT | Performed by: EMERGENCY MEDICINE

## 2022-01-01 PROCEDURE — 93005 ELECTROCARDIOGRAM TRACING: CPT

## 2022-01-01 PROCEDURE — C1876 STENT, NON-COA/NON-COV W/DEL: HCPCS | Performed by: SURGERY

## 2022-01-01 PROCEDURE — 1159F MED LIST DOCD IN RCRD: CPT | Mod: CPTII,S$GLB,, | Performed by: INTERNAL MEDICINE

## 2022-01-01 PROCEDURE — 99232 SBSQ HOSP IP/OBS MODERATE 35: CPT | Mod: 25,,, | Performed by: UROLOGY

## 2022-01-01 PROCEDURE — 80202 ASSAY OF VANCOMYCIN: CPT | Performed by: STUDENT IN AN ORGANIZED HEALTH CARE EDUCATION/TRAINING PROGRAM

## 2022-01-01 PROCEDURE — 99152 MOD SED SAME PHYS/QHP 5/>YRS: CPT | Performed by: SURGERY

## 2022-01-01 PROCEDURE — 44150 PR REMOVAL COLON/ILEOSTOMY: ICD-10-PCS | Mod: 22,58,, | Performed by: STUDENT IN AN ORGANIZED HEALTH CARE EDUCATION/TRAINING PROGRAM

## 2022-01-01 PROCEDURE — 51860 PR REPAIR BLADDER WOUND/INJ,SIMPLE: ICD-10-PCS | Mod: 58,51,, | Performed by: STUDENT IN AN ORGANIZED HEALTH CARE EDUCATION/TRAINING PROGRAM

## 2022-01-01 PROCEDURE — 80202 ASSAY OF VANCOMYCIN: CPT | Performed by: UROLOGY

## 2022-01-01 PROCEDURE — 99215 OFFICE O/P EST HI 40 MIN: CPT | Mod: ,,, | Performed by: INTERNAL MEDICINE

## 2022-01-01 PROCEDURE — 88307 TISSUE EXAM BY PATHOLOGIST: CPT | Mod: TC

## 2022-01-01 PROCEDURE — 44139 PR MOBILIZE SPLENIC FLEX: ICD-10-PCS | Mod: ,,, | Performed by: SURGERY

## 2022-01-01 PROCEDURE — 87340 HEPATITIS B SURFACE AG IA: CPT | Performed by: INTERNAL MEDICINE

## 2022-01-01 PROCEDURE — 99223 PR INITIAL HOSPITAL CARE,LEVL III: ICD-10-PCS | Mod: ,,, | Performed by: STUDENT IN AN ORGANIZED HEALTH CARE EDUCATION/TRAINING PROGRAM

## 2022-01-01 PROCEDURE — 3288F FALL RISK ASSESSMENT DOCD: CPT | Mod: CPTII,S$GLB,, | Performed by: INTERNAL MEDICINE

## 2022-01-01 PROCEDURE — 99214 PR OFFICE/OUTPT VISIT, EST, LEVL IV, 30-39 MIN: ICD-10-PCS | Mod: S$GLB,,, | Performed by: UROLOGY

## 2022-01-01 PROCEDURE — P9017 PLASMA 1 DONOR FRZ W/IN 8 HR: HCPCS | Performed by: STUDENT IN AN ORGANIZED HEALTH CARE EDUCATION/TRAINING PROGRAM

## 2022-01-01 PROCEDURE — 93005 ELECTROCARDIOGRAM TRACING: CPT | Performed by: GENERAL PRACTICE

## 2022-01-01 PROCEDURE — 3078F DIAST BP <80 MM HG: CPT | Mod: CPTII,S$GLB,, | Performed by: INTERNAL MEDICINE

## 2022-01-01 PROCEDURE — 71000039 HC RECOVERY, EACH ADD'L HOUR: Performed by: SURGERY

## 2022-01-01 PROCEDURE — 87077 CULTURE AEROBIC IDENTIFY: CPT | Performed by: EMERGENCY MEDICINE

## 2022-01-01 PROCEDURE — 82365 CALCULUS SPECTROSCOPY: CPT | Performed by: UROLOGY

## 2022-01-01 PROCEDURE — 86920 COMPATIBILITY TEST SPIN: CPT | Performed by: INTERNAL MEDICINE

## 2022-01-01 PROCEDURE — 99233 SBSQ HOSP IP/OBS HIGH 50: CPT | Mod: 25,,, | Performed by: STUDENT IN AN ORGANIZED HEALTH CARE EDUCATION/TRAINING PROGRAM

## 2022-01-01 PROCEDURE — 99449 NTRPROF PH1/NTRNET/EHR 31/>: CPT | Mod: ,,, | Performed by: UROLOGY

## 2022-01-01 PROCEDURE — 44150 REMOVAL OF COLON: CPT | Mod: 22,58,, | Performed by: STUDENT IN AN ORGANIZED HEALTH CARE EDUCATION/TRAINING PROGRAM

## 2022-01-01 PROCEDURE — 86901 BLOOD TYPING SEROLOGIC RH(D): CPT | Performed by: INTERNAL MEDICINE

## 2022-01-01 PROCEDURE — 87086 URINE CULTURE/COLONY COUNT: CPT | Performed by: HOSPITALIST

## 2022-01-01 PROCEDURE — 52332 PR CYSTOSCOPY,INSERT URETERAL STENT: ICD-10-PCS | Mod: 51,RT,, | Performed by: UROLOGY

## 2022-01-01 PROCEDURE — 52352 CYSTOURETERO W/STONE REMOVE: CPT | Mod: RT,,, | Performed by: UROLOGY

## 2022-01-01 PROCEDURE — 12001 RPR S/N/AX/GEN/TRNK 2.5CM/<: CPT

## 2022-01-01 PROCEDURE — 85730 THROMBOPLASTIN TIME PARTIAL: CPT | Mod: 91 | Performed by: INTERNAL MEDICINE

## 2022-01-01 PROCEDURE — 99223 1ST HOSP IP/OBS HIGH 75: CPT | Mod: 57,,, | Performed by: SURGERY

## 2022-01-01 PROCEDURE — 97116 GAIT TRAINING THERAPY: CPT | Mod: CQ

## 2022-01-01 PROCEDURE — 3288F PR FALLS RISK ASSESSMENT DOCUMENTED: ICD-10-PCS | Mod: CPTII,S$GLB,, | Performed by: INTERNAL MEDICINE

## 2022-01-01 PROCEDURE — 99232 PR SUBSEQUENT HOSPITAL CARE,LEVL II: ICD-10-PCS | Mod: 25,,, | Performed by: UROLOGY

## 2022-01-01 PROCEDURE — 82330 ASSAY OF CALCIUM: CPT | Performed by: STUDENT IN AN ORGANIZED HEALTH CARE EDUCATION/TRAINING PROGRAM

## 2022-01-01 PROCEDURE — 37000008 HC ANESTHESIA 1ST 15 MINUTES: Performed by: STUDENT IN AN ORGANIZED HEALTH CARE EDUCATION/TRAINING PROGRAM

## 2022-01-01 PROCEDURE — 3066F PR DOCUMENTATION OF TREATMENT FOR NEPHROPATHY: ICD-10-PCS | Mod: CPTII,S$GLB,, | Performed by: UROLOGY

## 2022-01-01 PROCEDURE — 1125F PR PAIN SEVERITY QUANTIFIED, PAIN PRESENT: ICD-10-PCS | Mod: S$GLB,,, | Performed by: INTERNAL MEDICINE

## 2022-01-01 PROCEDURE — 82550 ASSAY OF CK (CPK): CPT | Performed by: EMERGENCY MEDICINE

## 2022-01-01 PROCEDURE — 1160F PR REVIEW ALL MEDS BY PRESCRIBER/CLIN PHARMACIST DOCUMENTED: ICD-10-PCS | Mod: CPTII,S$GLB,, | Performed by: INTERNAL MEDICINE

## 2022-01-01 PROCEDURE — 82330 ASSAY OF CALCIUM: CPT | Performed by: INTERNAL MEDICINE

## 2022-01-01 PROCEDURE — 83735 ASSAY OF MAGNESIUM: CPT | Mod: 91 | Performed by: STUDENT IN AN ORGANIZED HEALTH CARE EDUCATION/TRAINING PROGRAM

## 2022-01-01 PROCEDURE — 27000080 OPTIME MED/SURG SUP & DEVICES GENERAL CLASSIFICATION: Performed by: UROLOGY

## 2022-01-01 PROCEDURE — 99223 PR INITIAL HOSPITAL CARE,LEVL III: ICD-10-PCS | Mod: 57,,, | Performed by: STUDENT IN AN ORGANIZED HEALTH CARE EDUCATION/TRAINING PROGRAM

## 2022-01-01 PROCEDURE — 27000080 OPTIME MED/SURG SUP & DEVICES GENERAL CLASSIFICATION: Performed by: SURGERY

## 2022-01-01 PROCEDURE — C1725 CATH, TRANSLUMIN NON-LASER: HCPCS | Performed by: SURGERY

## 2022-01-01 PROCEDURE — 93308 ECHO (CUPID ONLY): ICD-10-PCS | Mod: 26,,, | Performed by: INTERNAL MEDICINE

## 2022-01-01 PROCEDURE — 3066F NEPHROPATHY DOC TX: CPT | Mod: S$GLB,,, | Performed by: INTERNAL MEDICINE

## 2022-01-01 PROCEDURE — 74420 PR  X-RAY RETROGRADE PYELOGRAM: ICD-10-PCS | Mod: 26,,, | Performed by: UROLOGY

## 2022-01-01 PROCEDURE — C1894 INTRO/SHEATH, NON-LASER: HCPCS | Performed by: SURGERY

## 2022-01-01 PROCEDURE — C1729 CATH, DRAINAGE: HCPCS | Performed by: UROLOGY

## 2022-01-01 PROCEDURE — 96372 THER/PROPH/DIAG INJ SC/IM: CPT | Performed by: HOSPITALIST

## 2022-01-01 PROCEDURE — 93308 TTE F-UP OR LMTD: CPT

## 2022-01-01 PROCEDURE — 25000242 PHARM REV CODE 250 ALT 637 W/ HCPCS: Performed by: UROLOGY

## 2022-01-01 PROCEDURE — 87186 SC STD MICRODIL/AGAR DIL: CPT | Performed by: INTERNAL MEDICINE

## 2022-01-01 PROCEDURE — 99214 OFFICE O/P EST MOD 30 MIN: CPT | Mod: S$PBB,,, | Performed by: INTERNAL MEDICINE

## 2022-01-01 PROCEDURE — 99283 EMERGENCY DEPT VISIT LOW MDM: CPT

## 2022-01-01 PROCEDURE — 27201423 OPTIME MED/SURG SUP & DEVICES STERILE SUPPLY: Performed by: STUDENT IN AN ORGANIZED HEALTH CARE EDUCATION/TRAINING PROGRAM

## 2022-01-01 PROCEDURE — 99233 PR SUBSEQUENT HOSPITAL CARE,LEVL III: ICD-10-PCS | Mod: ,,, | Performed by: UROLOGY

## 2022-01-01 PROCEDURE — 71000033 HC RECOVERY, INTIAL HOUR: Performed by: SURGERY

## 2022-01-01 PROCEDURE — 1159F PR MEDICATION LIST DOCUMENTED IN MEDICAL RECORD: ICD-10-PCS | Mod: S$GLB,,, | Performed by: INTERNAL MEDICINE

## 2022-01-01 PROCEDURE — 3078F PR MOST RECENT DIASTOLIC BLOOD PRESSURE < 80 MM HG: ICD-10-PCS | Mod: CPTII,S$GLB,, | Performed by: INTERNAL MEDICINE

## 2022-01-01 PROCEDURE — 97167 OT EVAL HIGH COMPLEX 60 MIN: CPT

## 2022-01-01 PROCEDURE — 3008F BODY MASS INDEX DOCD: CPT | Mod: S$GLB,,, | Performed by: INTERNAL MEDICINE

## 2022-01-01 PROCEDURE — 93010 ELECTROCARDIOGRAM REPORT: CPT | Mod: ,,, | Performed by: GENERAL PRACTICE

## 2022-01-01 PROCEDURE — C1894 INTRO/SHEATH, NON-LASER: HCPCS | Performed by: UROLOGY

## 2022-01-01 PROCEDURE — 85379 FIBRIN DEGRADATION QUANT: CPT | Performed by: NURSE PRACTITIONER

## 2022-01-01 PROCEDURE — 44143 PR PART REMOVAL COLON W END COLOSTOMY: ICD-10-PCS | Mod: ,,, | Performed by: SURGERY

## 2022-01-01 PROCEDURE — 99223 1ST HOSP IP/OBS HIGH 75: CPT | Mod: 57,,, | Performed by: STUDENT IN AN ORGANIZED HEALTH CARE EDUCATION/TRAINING PROGRAM

## 2022-01-01 DEVICE — IMPLANTABLE DEVICE: Type: IMPLANTABLE DEVICE | Site: URETER | Status: FUNCTIONAL

## 2022-01-01 DEVICE — STENT URETERAL UNIV 7FR 26CM: Type: IMPLANTABLE DEVICE | Site: URETER | Status: FUNCTIONAL

## 2022-01-01 DEVICE — CATHETER HEMOSPLIT 19CM 5733690: Type: IMPLANTABLE DEVICE | Site: CHEST | Status: FUNCTIONAL

## 2022-01-01 DEVICE — VIABAHN SX ENDO HEPARIN 18 RO 8MMX5CM 7FR 120CM CATH
Type: IMPLANTABLE DEVICE | Site: ARM | Status: FUNCTIONAL
Brand: GORE VIABAHN ENDOPROSTHESIS WITH HEPARIN

## 2022-01-01 RX ORDER — MEROPENEM AND SODIUM CHLORIDE 500 MG/50ML
500 INJECTION, SOLUTION INTRAVENOUS
Status: DISCONTINUED | OUTPATIENT
Start: 2022-01-01 | End: 2022-01-01

## 2022-01-01 RX ORDER — ONDANSETRON 2 MG/ML
INJECTION INTRAMUSCULAR; INTRAVENOUS
Status: DISCONTINUED | OUTPATIENT
Start: 2022-01-01 | End: 2022-01-01

## 2022-01-01 RX ORDER — OXYCODONE HYDROCHLORIDE 5 MG/1
5 TABLET ORAL
Status: DISCONTINUED | OUTPATIENT
Start: 2022-01-01 | End: 2022-01-01 | Stop reason: HOSPADM

## 2022-01-01 RX ORDER — DOXYCYCLINE 100 MG/1
100 CAPSULE ORAL EVERY 12 HOURS
Qty: 6 CAPSULE | Refills: 0 | Status: SHIPPED | OUTPATIENT
Start: 2022-01-01 | End: 2022-01-01

## 2022-01-01 RX ORDER — GLUCAGON 1 MG
1 KIT INJECTION
Status: DISCONTINUED | OUTPATIENT
Start: 2022-01-01 | End: 2022-01-01 | Stop reason: HOSPADM

## 2022-01-01 RX ORDER — MEROPENEM AND SODIUM CHLORIDE 500 MG/50ML
500 INJECTION, SOLUTION INTRAVENOUS EVERY 12 HOURS
Qty: 14 EACH | Refills: 0
Start: 2022-01-01 | End: 2022-01-01

## 2022-01-01 RX ORDER — HEPARIN SODIUM,PORCINE/D5W 25000/250
0-40 INTRAVENOUS SOLUTION INTRAVENOUS CONTINUOUS
Status: DISCONTINUED | OUTPATIENT
Start: 2022-01-01 | End: 2022-01-01

## 2022-01-01 RX ORDER — SODIUM CHLORIDE 9 MG/ML
INJECTION, SOLUTION INTRAVENOUS ONCE
Status: DISCONTINUED | OUTPATIENT
Start: 2022-01-01 | End: 2022-01-01 | Stop reason: HOSPADM

## 2022-01-01 RX ORDER — ROPINIROLE 0.25 MG/1
0.5 TABLET, FILM COATED ORAL EVERY 8 HOURS
Status: DISCONTINUED | OUTPATIENT
Start: 2022-01-01 | End: 2022-01-01 | Stop reason: HOSPADM

## 2022-01-01 RX ORDER — ALBUTEROL SULFATE 90 UG/1
2 AEROSOL, METERED RESPIRATORY (INHALATION) EVERY 6 HOURS
Status: DISCONTINUED | OUTPATIENT
Start: 2022-01-01 | End: 2022-01-01

## 2022-01-01 RX ORDER — DOXYLAMINE SUCCINATE 25 MG
TABLET ORAL 2 TIMES DAILY
Qty: 28 G | Refills: 0 | Status: SHIPPED | OUTPATIENT
Start: 2022-01-01 | End: 2022-01-01

## 2022-01-01 RX ORDER — SUCCINYLCHOLINE CHLORIDE 20 MG/ML INJECTION SOLUTION
SOLUTION
Status: DISCONTINUED | OUTPATIENT
Start: 2022-01-01 | End: 2022-01-01

## 2022-01-01 RX ORDER — DIPHENHYDRAMINE HYDROCHLORIDE 50 MG/ML
6.25 INJECTION INTRAMUSCULAR; INTRAVENOUS ONCE
Status: COMPLETED | OUTPATIENT
Start: 2022-01-01 | End: 2022-01-01

## 2022-01-01 RX ORDER — CEFEPIME HYDROCHLORIDE 1 G/50ML
1 INJECTION, SOLUTION INTRAVENOUS
Status: DISCONTINUED | OUTPATIENT
Start: 2022-01-01 | End: 2022-01-01

## 2022-01-01 RX ORDER — ENOXAPARIN SODIUM 100 MG/ML
30 INJECTION SUBCUTANEOUS
Status: DISCONTINUED | OUTPATIENT
Start: 2022-01-01 | End: 2022-01-01 | Stop reason: HOSPADM

## 2022-01-01 RX ORDER — MUPIROCIN 20 MG/G
OINTMENT TOPICAL 2 TIMES DAILY
Status: DISCONTINUED | OUTPATIENT
Start: 2022-01-01 | End: 2022-01-01 | Stop reason: HOSPADM

## 2022-01-01 RX ORDER — SODIUM CHLORIDE 9 MG/ML
INJECTION, SOLUTION INTRAVENOUS CONTINUOUS
Status: DISCONTINUED | OUTPATIENT
Start: 2022-01-01 | End: 2022-01-01

## 2022-01-01 RX ORDER — MORPHINE SULFATE 4 MG/ML
4 INJECTION, SOLUTION INTRAMUSCULAR; INTRAVENOUS
Status: DISCONTINUED | OUTPATIENT
Start: 2022-01-01 | End: 2022-01-01

## 2022-01-01 RX ORDER — INSULIN ASPART 100 [IU]/ML
1-10 INJECTION, SOLUTION INTRAVENOUS; SUBCUTANEOUS
Status: DISCONTINUED | OUTPATIENT
Start: 2022-01-01 | End: 2022-01-01 | Stop reason: HOSPADM

## 2022-01-01 RX ORDER — HYDRALAZINE HYDROCHLORIDE 20 MG/ML
10 INJECTION INTRAMUSCULAR; INTRAVENOUS EVERY 8 HOURS PRN
Status: DISCONTINUED | OUTPATIENT
Start: 2022-01-01 | End: 2022-01-01 | Stop reason: HOSPADM

## 2022-01-01 RX ORDER — SIMETHICONE 80 MG
1 TABLET,CHEWABLE ORAL
Status: DISCONTINUED | OUTPATIENT
Start: 2022-01-01 | End: 2022-01-01 | Stop reason: HOSPADM

## 2022-01-01 RX ORDER — HYDROMORPHONE HYDROCHLORIDE 1 MG/ML
0.2 INJECTION, SOLUTION INTRAMUSCULAR; INTRAVENOUS; SUBCUTANEOUS EVERY 5 MIN PRN
Status: DISCONTINUED | OUTPATIENT
Start: 2022-01-01 | End: 2022-01-01 | Stop reason: HOSPADM

## 2022-01-01 RX ORDER — PHENYLEPHRINE HYDROCHLORIDE 10 MG/ML
INJECTION INTRAVENOUS
Status: DISCONTINUED | OUTPATIENT
Start: 2022-01-01 | End: 2022-01-01

## 2022-01-01 RX ORDER — LORAZEPAM 2 MG/ML
1 INJECTION INTRAMUSCULAR ONCE
Status: COMPLETED | OUTPATIENT
Start: 2022-01-01 | End: 2022-01-01

## 2022-01-01 RX ORDER — MORPHINE SULFATE 2 MG/ML
2 INJECTION, SOLUTION INTRAMUSCULAR; INTRAVENOUS EVERY 4 HOURS PRN
Status: DISCONTINUED | OUTPATIENT
Start: 2022-01-01 | End: 2022-01-01

## 2022-01-01 RX ORDER — SERTRALINE HYDROCHLORIDE 50 MG/1
50 TABLET, FILM COATED ORAL NIGHTLY
Status: DISCONTINUED | OUTPATIENT
Start: 2022-01-01 | End: 2022-01-01 | Stop reason: HOSPADM

## 2022-01-01 RX ORDER — ACETAMINOPHEN 325 MG/1
650 TABLET ORAL EVERY 8 HOURS PRN
Status: DISCONTINUED | OUTPATIENT
Start: 2022-01-01 | End: 2022-01-01 | Stop reason: HOSPADM

## 2022-01-01 RX ORDER — PROMETHAZINE HYDROCHLORIDE 25 MG/1
25 TABLET ORAL 2 TIMES DAILY PRN
Qty: 60 TABLET | Refills: 0 | Status: SHIPPED | OUTPATIENT
Start: 2022-01-01 | End: 2022-01-01

## 2022-01-01 RX ORDER — HYDROXYZINE HYDROCHLORIDE 25 MG/1
25 TABLET, FILM COATED ORAL 3 TIMES DAILY PRN
Start: 2022-01-01

## 2022-01-01 RX ORDER — LORAZEPAM 0.5 MG/1
0.5 TABLET ORAL EVERY 6 HOURS PRN
Status: DISCONTINUED | OUTPATIENT
Start: 2022-01-01 | End: 2022-01-01 | Stop reason: HOSPADM

## 2022-01-01 RX ORDER — POTASSIUM CHLORIDE 7.45 MG/ML
20 INJECTION INTRAVENOUS ONCE
Status: DISCONTINUED | OUTPATIENT
Start: 2022-01-01 | End: 2022-01-01

## 2022-01-01 RX ORDER — CALCIUM GLUCONATE 20 MG/ML
1 INJECTION, SOLUTION INTRAVENOUS
Status: DISCONTINUED | OUTPATIENT
Start: 2022-01-01 | End: 2022-01-01 | Stop reason: HOSPADM

## 2022-01-01 RX ORDER — MORPHINE SULFATE 4 MG/ML
4 INJECTION, SOLUTION INTRAMUSCULAR; INTRAVENOUS
Status: COMPLETED | OUTPATIENT
Start: 2022-01-01 | End: 2022-01-01

## 2022-01-01 RX ORDER — FENTANYL CITRATE 50 UG/ML
INJECTION, SOLUTION INTRAMUSCULAR; INTRAVENOUS
Status: DISCONTINUED | OUTPATIENT
Start: 2022-01-01 | End: 2022-01-01

## 2022-01-01 RX ORDER — CYCLOBENZAPRINE HCL 10 MG
10 TABLET ORAL 3 TIMES DAILY PRN
Status: DISCONTINUED | OUTPATIENT
Start: 2022-01-01 | End: 2022-01-01 | Stop reason: HOSPADM

## 2022-01-01 RX ORDER — PROCHLORPERAZINE EDISYLATE 5 MG/ML
2.5 INJECTION INTRAMUSCULAR; INTRAVENOUS EVERY 6 HOURS PRN
Status: DISCONTINUED | OUTPATIENT
Start: 2022-01-01 | End: 2022-01-01 | Stop reason: HOSPADM

## 2022-01-01 RX ORDER — SCOLOPAMINE TRANSDERMAL SYSTEM 1 MG/1
1 PATCH, EXTENDED RELEASE TRANSDERMAL
Status: DISCONTINUED | OUTPATIENT
Start: 2022-01-01 | End: 2022-01-01 | Stop reason: HOSPADM

## 2022-01-01 RX ORDER — SODIUM CHLORIDE, SODIUM LACTATE, POTASSIUM CHLORIDE, CALCIUM CHLORIDE 600; 310; 30; 20 MG/100ML; MG/100ML; MG/100ML; MG/100ML
INJECTION, SOLUTION INTRAVENOUS CONTINUOUS
Status: DISCONTINUED | OUTPATIENT
Start: 2022-01-01 | End: 2022-01-01 | Stop reason: HOSPADM

## 2022-01-01 RX ORDER — LIDOCAINE HYDROCHLORIDE 20 MG/ML
JELLY TOPICAL
Status: DISCONTINUED | OUTPATIENT
Start: 2022-01-01 | End: 2022-01-01 | Stop reason: HOSPADM

## 2022-01-01 RX ORDER — LACTULOSE 10 G/15ML
20 SOLUTION ORAL ONCE
Status: COMPLETED | OUTPATIENT
Start: 2022-01-01 | End: 2022-01-01

## 2022-01-01 RX ORDER — VANCOMYCIN HCL IN 5 % DEXTROSE 1G/250ML
1000 PLASTIC BAG, INJECTION (ML) INTRAVENOUS ONCE
Status: COMPLETED | OUTPATIENT
Start: 2022-01-01 | End: 2022-01-01

## 2022-01-01 RX ORDER — ONDANSETRON 2 MG/ML
4 INJECTION INTRAMUSCULAR; INTRAVENOUS EVERY 8 HOURS PRN
Status: DISCONTINUED | OUTPATIENT
Start: 2022-01-01 | End: 2022-01-01 | Stop reason: HOSPADM

## 2022-01-01 RX ORDER — CYANOCOBALAMIN 1000 UG/ML
1000 INJECTION, SOLUTION INTRAMUSCULAR; SUBCUTANEOUS DAILY
Status: DISCONTINUED | OUTPATIENT
Start: 2022-01-01 | End: 2022-01-01 | Stop reason: HOSPADM

## 2022-01-01 RX ORDER — ENOXAPARIN SODIUM 100 MG/ML
30 INJECTION SUBCUTANEOUS EVERY 24 HOURS
Status: DISCONTINUED | OUTPATIENT
Start: 2022-01-01 | End: 2022-01-01 | Stop reason: HOSPADM

## 2022-01-01 RX ORDER — GABAPENTIN 100 MG/1
100 CAPSULE ORAL 2 TIMES DAILY
Status: DISCONTINUED | OUTPATIENT
Start: 2022-01-01 | End: 2022-01-01

## 2022-01-01 RX ORDER — IPRATROPIUM BROMIDE AND ALBUTEROL SULFATE 2.5; .5 MG/3ML; MG/3ML
3 SOLUTION RESPIRATORY (INHALATION)
Status: DISCONTINUED | OUTPATIENT
Start: 2022-01-01 | End: 2022-01-01

## 2022-01-01 RX ORDER — MORPHINE SULFATE 4 MG/ML
4 INJECTION, SOLUTION INTRAMUSCULAR; INTRAVENOUS EVERY 4 HOURS PRN
Status: DISCONTINUED | OUTPATIENT
Start: 2022-01-01 | End: 2022-01-01 | Stop reason: HOSPADM

## 2022-01-01 RX ORDER — SODIUM CHLORIDE 0.9 % (FLUSH) 0.9 %
10 SYRINGE (ML) INJECTION
Status: DISCONTINUED | OUTPATIENT
Start: 2022-01-01 | End: 2022-01-01 | Stop reason: HOSPADM

## 2022-01-01 RX ORDER — POTASSIUM CHLORIDE 7.45 MG/ML
10 INJECTION INTRAVENOUS ONCE
Status: COMPLETED | OUTPATIENT
Start: 2022-01-01 | End: 2022-01-01

## 2022-01-01 RX ORDER — MAG HYDROX/ALUMINUM HYD/SIMETH 200-200-20
30 SUSPENSION, ORAL (FINAL DOSE FORM) ORAL 4 TIMES DAILY PRN
Status: DISCONTINUED | OUTPATIENT
Start: 2022-01-01 | End: 2022-01-01 | Stop reason: HOSPADM

## 2022-01-01 RX ORDER — DIPHENHYDRAMINE HYDROCHLORIDE 50 MG/ML
INJECTION INTRAMUSCULAR; INTRAVENOUS
Status: COMPLETED
Start: 2022-01-01 | End: 2022-01-01

## 2022-01-01 RX ORDER — MEPERIDINE HYDROCHLORIDE 50 MG/ML
12.5 INJECTION INTRAMUSCULAR; INTRAVENOUS; SUBCUTANEOUS ONCE AS NEEDED
Status: DISCONTINUED | OUTPATIENT
Start: 2022-01-01 | End: 2022-01-01 | Stop reason: HOSPADM

## 2022-01-01 RX ORDER — ATROPINE SULFATE 10 MG/ML
2 SOLUTION/ DROPS OPHTHALMIC EVERY 4 HOURS PRN
Status: DISCONTINUED | OUTPATIENT
Start: 2022-01-01 | End: 2022-01-01 | Stop reason: HOSPADM

## 2022-01-01 RX ORDER — ONDANSETRON 2 MG/ML
4 INJECTION INTRAMUSCULAR; INTRAVENOUS ONCE AS NEEDED
Status: DISCONTINUED | OUTPATIENT
Start: 2022-01-01 | End: 2022-01-01 | Stop reason: HOSPADM

## 2022-01-01 RX ORDER — SODIUM CHLORIDE, SODIUM LACTATE, POTASSIUM CHLORIDE, CALCIUM CHLORIDE 600; 310; 30; 20 MG/100ML; MG/100ML; MG/100ML; MG/100ML
INJECTION, SOLUTION INTRAVENOUS CONTINUOUS PRN
Status: DISCONTINUED | OUTPATIENT
Start: 2022-01-01 | End: 2022-01-01

## 2022-01-01 RX ORDER — POTASSIUM CHLORIDE 29.8 MG/ML
40 INJECTION INTRAVENOUS
Status: DISCONTINUED | OUTPATIENT
Start: 2022-01-01 | End: 2022-01-01 | Stop reason: HOSPADM

## 2022-01-01 RX ORDER — BUDESONIDE 0.5 MG/2ML
0.5 INHALANT ORAL DAILY
Qty: 14 ML | Refills: 0 | Status: SHIPPED | OUTPATIENT
Start: 2022-01-01 | End: 2022-01-01 | Stop reason: ALTCHOICE

## 2022-01-01 RX ORDER — ACETAMINOPHEN 500 MG
1000 TABLET ORAL EVERY 8 HOURS PRN
Status: DISCONTINUED | OUTPATIENT
Start: 2022-01-01 | End: 2022-01-01 | Stop reason: HOSPADM

## 2022-01-01 RX ORDER — MAGNESIUM SULFATE HEPTAHYDRATE 40 MG/ML
2 INJECTION, SOLUTION INTRAVENOUS
Status: DISCONTINUED | OUTPATIENT
Start: 2022-01-01 | End: 2022-01-01 | Stop reason: HOSPADM

## 2022-01-01 RX ORDER — POTASSIUM CHLORIDE 20 MEQ/1
20 TABLET, EXTENDED RELEASE ORAL
Status: DISCONTINUED | OUTPATIENT
Start: 2022-01-01 | End: 2022-01-01 | Stop reason: HOSPADM

## 2022-01-01 RX ORDER — LEVOTHYROXINE SODIUM 88 UG/1
88 TABLET ORAL
Status: DISCONTINUED | OUTPATIENT
Start: 2022-01-01 | End: 2022-01-01 | Stop reason: HOSPADM

## 2022-01-01 RX ORDER — DRONABINOL 2.5 MG/1
2.5 CAPSULE ORAL
Status: DISCONTINUED | OUTPATIENT
Start: 2022-01-01 | End: 2022-01-01

## 2022-01-01 RX ORDER — SODIUM CHLORIDE 9 MG/ML
INJECTION, SOLUTION INTRAVENOUS CONTINUOUS
Status: ACTIVE | OUTPATIENT
Start: 2022-01-01 | End: 2022-01-01

## 2022-01-01 RX ORDER — SEVELAMER CARBONATE 800 MG/1
800 TABLET, FILM COATED ORAL
COMMUNITY
Start: 2022-01-01

## 2022-01-01 RX ORDER — MAGNESIUM SULFATE 1 G/100ML
1 INJECTION INTRAVENOUS ONCE
Status: COMPLETED | OUTPATIENT
Start: 2022-01-01 | End: 2022-01-01

## 2022-01-01 RX ORDER — CLINDAMYCIN PHOSPHATE 900 MG/50ML
900 INJECTION, SOLUTION INTRAVENOUS
Status: COMPLETED | OUTPATIENT
Start: 2022-01-01 | End: 2022-01-01

## 2022-01-01 RX ORDER — LIDOCAINE HCL/PF 100 MG/5ML
SYRINGE (ML) INTRAVENOUS
Status: DISCONTINUED | OUTPATIENT
Start: 2022-01-01 | End: 2022-01-01

## 2022-01-01 RX ORDER — ACETAMINOPHEN 650 MG/1
650 SUPPOSITORY RECTAL EVERY 6 HOURS PRN
Status: DISCONTINUED | OUTPATIENT
Start: 2022-01-01 | End: 2022-01-01 | Stop reason: HOSPADM

## 2022-01-01 RX ORDER — IPRATROPIUM BROMIDE AND ALBUTEROL SULFATE 2.5; .5 MG/3ML; MG/3ML
3 SOLUTION RESPIRATORY (INHALATION) EVERY 6 HOURS PRN
Status: DISCONTINUED | OUTPATIENT
Start: 2022-01-01 | End: 2022-01-01 | Stop reason: HOSPADM

## 2022-01-01 RX ORDER — OXYCODONE HYDROCHLORIDE 5 MG/1
5 TABLET ORAL
Status: DISCONTINUED | OUTPATIENT
Start: 2022-01-01 | End: 2022-01-01

## 2022-01-01 RX ORDER — LORAZEPAM 2 MG/ML
1 INJECTION INTRAMUSCULAR
Status: DISCONTINUED | OUTPATIENT
Start: 2022-01-01 | End: 2022-01-01

## 2022-01-01 RX ORDER — POTASSIUM CHLORIDE 7.45 MG/ML
10 INJECTION INTRAVENOUS ONCE
Status: DISCONTINUED | OUTPATIENT
Start: 2022-01-01 | End: 2022-01-01

## 2022-01-01 RX ORDER — CYANOCOBALAMIN 1000 UG/ML
1000 INJECTION, SOLUTION INTRAMUSCULAR; SUBCUTANEOUS DAILY
Qty: 10 ML | Refills: 0 | Status: SHIPPED | OUTPATIENT
Start: 2022-01-01 | End: 2022-01-01

## 2022-01-01 RX ORDER — ACETAMINOPHEN 500 MG
1000 TABLET ORAL
Status: COMPLETED | OUTPATIENT
Start: 2022-01-01 | End: 2022-01-01

## 2022-01-01 RX ORDER — MIDODRINE HYDROCHLORIDE 2.5 MG/1
5 TABLET ORAL 3 TIMES DAILY
Status: DISCONTINUED | OUTPATIENT
Start: 2022-01-01 | End: 2022-01-01

## 2022-01-01 RX ORDER — LEVOFLOXACIN 5 MG/ML
500 INJECTION, SOLUTION INTRAVENOUS
Status: DISCONTINUED | OUTPATIENT
Start: 2022-01-01 | End: 2022-01-01

## 2022-01-01 RX ORDER — HYDROCODONE BITARTRATE AND ACETAMINOPHEN 5; 325 MG/1; MG/1
1 TABLET ORAL EVERY 6 HOURS PRN
Status: DISCONTINUED | OUTPATIENT
Start: 2022-01-01 | End: 2022-01-01 | Stop reason: HOSPADM

## 2022-01-01 RX ORDER — DIPHENHYDRAMINE HCL 25 MG
25 CAPSULE ORAL EVERY 6 HOURS PRN
Status: DISCONTINUED | OUTPATIENT
Start: 2022-01-01 | End: 2022-01-01 | Stop reason: HOSPADM

## 2022-01-01 RX ORDER — FAMOTIDINE 10 MG/ML
INJECTION INTRAVENOUS
Status: DISCONTINUED | OUTPATIENT
Start: 2022-01-01 | End: 2022-01-01

## 2022-01-01 RX ORDER — POTASSIUM CHLORIDE 20 MEQ/1
40 TABLET, EXTENDED RELEASE ORAL
Status: DISCONTINUED | OUTPATIENT
Start: 2022-01-01 | End: 2022-01-01 | Stop reason: HOSPADM

## 2022-01-01 RX ORDER — HYDRALAZINE HYDROCHLORIDE 25 MG/1
25 TABLET, FILM COATED ORAL EVERY 12 HOURS
Status: DISCONTINUED | OUTPATIENT
Start: 2022-01-01 | End: 2022-01-01

## 2022-01-01 RX ORDER — HYDROCODONE BITARTRATE AND ACETAMINOPHEN 5; 325 MG/1; MG/1
1 TABLET ORAL
Status: COMPLETED | OUTPATIENT
Start: 2022-01-01 | End: 2022-01-01

## 2022-01-01 RX ORDER — NALOXONE HCL 0.4 MG/ML
0.02 VIAL (ML) INJECTION
Status: DISCONTINUED | OUTPATIENT
Start: 2022-01-01 | End: 2022-01-01 | Stop reason: HOSPADM

## 2022-01-01 RX ORDER — DIAZEPAM 2 MG/1
2 TABLET ORAL EVERY 6 HOURS PRN
Status: DISCONTINUED | OUTPATIENT
Start: 2022-01-01 | End: 2022-01-01 | Stop reason: HOSPADM

## 2022-01-01 RX ORDER — HYDROCODONE BITARTRATE AND ACETAMINOPHEN 500; 5 MG/1; MG/1
TABLET ORAL
Status: DISCONTINUED | OUTPATIENT
Start: 2022-01-01 | End: 2022-01-01

## 2022-01-01 RX ORDER — NYSTATIN AND TRIAMCINOLONE ACETONIDE 100000; 1 [USP'U]/G; MG/G
CREAM TOPICAL 4 TIMES DAILY
Status: DISCONTINUED | OUTPATIENT
Start: 2022-01-01 | End: 2022-01-01 | Stop reason: HOSPADM

## 2022-01-01 RX ORDER — OXYCODONE HYDROCHLORIDE 5 MG/1
5 TABLET ORAL EVERY 4 HOURS PRN
Status: DISCONTINUED | OUTPATIENT
Start: 2022-01-01 | End: 2022-01-01 | Stop reason: HOSPADM

## 2022-01-01 RX ORDER — HYDROCODONE BITARTRATE AND ACETAMINOPHEN 5; 325 MG/1; MG/1
1 TABLET ORAL EVERY 6 HOURS PRN
Status: DISCONTINUED | OUTPATIENT
Start: 2022-01-01 | End: 2022-01-01

## 2022-01-01 RX ORDER — IBUPROFEN 200 MG
16 TABLET ORAL
Status: DISCONTINUED | OUTPATIENT
Start: 2022-01-01 | End: 2022-01-01 | Stop reason: HOSPADM

## 2022-01-01 RX ORDER — MAGNESIUM SULFATE HEPTAHYDRATE 40 MG/ML
4 INJECTION, SOLUTION INTRAVENOUS
Status: DISCONTINUED | OUTPATIENT
Start: 2022-01-01 | End: 2022-01-01 | Stop reason: HOSPADM

## 2022-01-01 RX ORDER — SODIUM CHLORIDE 9 MG/ML
1000 INJECTION, SOLUTION INTRAVENOUS
Status: COMPLETED | OUTPATIENT
Start: 2022-01-01 | End: 2022-01-01

## 2022-01-01 RX ORDER — HYDRALAZINE HYDROCHLORIDE 25 MG/1
50 TABLET, FILM COATED ORAL EVERY 8 HOURS
Status: DISCONTINUED | OUTPATIENT
Start: 2022-01-01 | End: 2022-01-01 | Stop reason: HOSPADM

## 2022-01-01 RX ORDER — DIPHENHYDRAMINE HYDROCHLORIDE 50 MG/ML
12.5 INJECTION INTRAMUSCULAR; INTRAVENOUS
Status: DISCONTINUED | OUTPATIENT
Start: 2022-01-01 | End: 2022-01-01 | Stop reason: HOSPADM

## 2022-01-01 RX ORDER — ACETAMINOPHEN 10 MG/ML
INJECTION, SOLUTION INTRAVENOUS
Status: DISCONTINUED | OUTPATIENT
Start: 2022-01-01 | End: 2022-01-01

## 2022-01-01 RX ORDER — HYDROCODONE BITARTRATE AND ACETAMINOPHEN 500; 5 MG/1; MG/1
TABLET ORAL
Status: DISCONTINUED | OUTPATIENT
Start: 2022-01-01 | End: 2022-01-01 | Stop reason: HOSPADM

## 2022-01-01 RX ORDER — BENZONATATE 100 MG/1
100 CAPSULE ORAL 3 TIMES DAILY PRN
Status: DISCONTINUED | OUTPATIENT
Start: 2022-01-01 | End: 2022-01-01 | Stop reason: HOSPADM

## 2022-01-01 RX ORDER — CEPHALEXIN 500 MG/1
500 CAPSULE ORAL EVERY 8 HOURS
Qty: 21 CAPSULE | Refills: 0 | Status: SHIPPED | OUTPATIENT
Start: 2022-01-01 | End: 2022-01-01

## 2022-01-01 RX ORDER — FENTANYL CITRATE 50 UG/ML
INJECTION, SOLUTION INTRAMUSCULAR; INTRAVENOUS
Status: COMPLETED
Start: 2022-01-01 | End: 2022-01-01

## 2022-01-01 RX ORDER — PROPOFOL 10 MG/ML
VIAL (ML) INTRAVENOUS
Status: DISCONTINUED | OUTPATIENT
Start: 2022-01-01 | End: 2022-01-01

## 2022-01-01 RX ORDER — LIDOCAINE HYDROCHLORIDE AND EPINEPHRINE 10; 10 MG/ML; UG/ML
10 INJECTION, SOLUTION INFILTRATION; PERINEURAL
Status: COMPLETED | OUTPATIENT
Start: 2022-01-01 | End: 2022-01-01

## 2022-01-01 RX ORDER — GABAPENTIN 100 MG/1
100 CAPSULE ORAL 2 TIMES DAILY
Status: DISCONTINUED | OUTPATIENT
Start: 2022-01-01 | End: 2022-01-01 | Stop reason: HOSPADM

## 2022-01-01 RX ORDER — ONDANSETRON 4 MG/1
4 TABLET, ORALLY DISINTEGRATING ORAL ONCE
Qty: 15 TABLET | Refills: 0 | Status: SHIPPED | OUTPATIENT
Start: 2022-01-01 | End: 2022-01-01

## 2022-01-01 RX ORDER — HEPARIN SODIUM 5000 [USP'U]/ML
5000 INJECTION, SOLUTION INTRAVENOUS; SUBCUTANEOUS EVERY 8 HOURS
Status: DISCONTINUED | OUTPATIENT
Start: 2022-01-01 | End: 2022-01-01

## 2022-01-01 RX ORDER — IBUPROFEN 200 MG
24 TABLET ORAL
Status: DISCONTINUED | OUTPATIENT
Start: 2022-01-01 | End: 2022-01-01 | Stop reason: HOSPADM

## 2022-01-01 RX ORDER — ZINC GLUCONATE 50 MG
50 TABLET ORAL DAILY
COMMUNITY

## 2022-01-01 RX ORDER — LEVOFLOXACIN 250 MG/1
250 TABLET ORAL
Status: DISCONTINUED | OUTPATIENT
Start: 2022-01-01 | End: 2022-01-01

## 2022-01-01 RX ORDER — TRAMADOL HYDROCHLORIDE 50 MG/1
50 TABLET ORAL EVERY 6 HOURS PRN
Qty: 30 TABLET | Refills: 1 | Status: SHIPPED | OUTPATIENT
Start: 2022-01-01 | End: 2022-01-01

## 2022-01-01 RX ORDER — SODIUM CHLORIDE 0.9 % (FLUSH) 0.9 %
10 SYRINGE (ML) INJECTION EVERY 6 HOURS PRN
Status: DISCONTINUED | OUTPATIENT
Start: 2022-01-01 | End: 2022-01-01 | Stop reason: HOSPADM

## 2022-01-01 RX ORDER — CALCIUM GLUCONATE 20 MG/ML
3 INJECTION, SOLUTION INTRAVENOUS
Status: DISCONTINUED | OUTPATIENT
Start: 2022-01-01 | End: 2022-01-01 | Stop reason: HOSPADM

## 2022-01-01 RX ORDER — FENTANYL CITRATE 50 UG/ML
25 INJECTION, SOLUTION INTRAMUSCULAR; INTRAVENOUS EVERY 5 MIN PRN
Status: COMPLETED | OUTPATIENT
Start: 2022-01-01 | End: 2022-01-01

## 2022-01-01 RX ORDER — OXYCODONE HYDROCHLORIDE 5 MG/1
TABLET ORAL
Status: DISPENSED
Start: 2022-01-01 | End: 2022-01-01

## 2022-01-01 RX ORDER — LIDOCAINE HYDROCHLORIDE 10 MG/ML
INJECTION INFILTRATION; PERINEURAL
Status: DISCONTINUED | OUTPATIENT
Start: 2022-01-01 | End: 2022-01-01 | Stop reason: HOSPADM

## 2022-01-01 RX ORDER — CALCIUM GLUCONATE 20 MG/ML
3 INJECTION, SOLUTION INTRAVENOUS
Status: DISCONTINUED | OUTPATIENT
Start: 2022-01-01 | End: 2022-01-01

## 2022-01-01 RX ORDER — MORPHINE SULFATE 2 MG/ML
2 INJECTION, SOLUTION INTRAMUSCULAR; INTRAVENOUS EVERY 4 HOURS PRN
Status: DISCONTINUED | OUTPATIENT
Start: 2022-01-01 | End: 2022-01-01 | Stop reason: HOSPADM

## 2022-01-01 RX ORDER — PROCHLORPERAZINE EDISYLATE 5 MG/ML
5 INJECTION INTRAMUSCULAR; INTRAVENOUS EVERY 6 HOURS PRN
Status: DISCONTINUED | OUTPATIENT
Start: 2022-01-01 | End: 2022-01-01

## 2022-01-01 RX ORDER — ONDANSETRON 2 MG/ML
4 INJECTION INTRAMUSCULAR; INTRAVENOUS EVERY 6 HOURS PRN
Status: DISCONTINUED | OUTPATIENT
Start: 2022-01-01 | End: 2022-01-01 | Stop reason: HOSPADM

## 2022-01-01 RX ORDER — HYDROCODONE BITARTRATE AND ACETAMINOPHEN 5; 325 MG/1; MG/1
1 TABLET ORAL EVERY 8 HOURS PRN
Qty: 221 TABLET | Refills: 0 | Status: ON HOLD | OUTPATIENT
Start: 2022-01-01 | End: 2022-01-01 | Stop reason: SDUPTHER

## 2022-01-01 RX ORDER — HYDROMORPHONE HYDROCHLORIDE 1 MG/ML
1 INJECTION, SOLUTION INTRAMUSCULAR; INTRAVENOUS; SUBCUTANEOUS EVERY 4 HOURS PRN
Status: DISCONTINUED | OUTPATIENT
Start: 2022-01-01 | End: 2022-01-01

## 2022-01-01 RX ORDER — DILTIAZEM HYDROCHLORIDE 120 MG/1
120 CAPSULE, COATED, EXTENDED RELEASE ORAL DAILY
Status: DISCONTINUED | OUTPATIENT
Start: 2022-01-01 | End: 2022-01-01 | Stop reason: HOSPADM

## 2022-01-01 RX ORDER — HYDROMORPHONE HYDROCHLORIDE 2 MG/ML
0.2 INJECTION, SOLUTION INTRAMUSCULAR; INTRAVENOUS; SUBCUTANEOUS EVERY 5 MIN PRN
Status: DISCONTINUED | OUTPATIENT
Start: 2022-01-01 | End: 2022-01-01 | Stop reason: HOSPADM

## 2022-01-01 RX ORDER — TALC
6 POWDER (GRAM) TOPICAL NIGHTLY PRN
Status: DISCONTINUED | OUTPATIENT
Start: 2022-01-01 | End: 2022-01-01 | Stop reason: HOSPADM

## 2022-01-01 RX ORDER — ZINC SULFATE 50(220)MG
1 CAPSULE ORAL DAILY
COMMUNITY
Start: 2022-01-01

## 2022-01-01 RX ORDER — MORPHINE SULFATE 2 MG/ML
2 INJECTION, SOLUTION INTRAMUSCULAR; INTRAVENOUS
Status: DISCONTINUED | OUTPATIENT
Start: 2022-01-01 | End: 2022-01-01 | Stop reason: HOSPADM

## 2022-01-01 RX ORDER — SUCCINYLCHOLINE CHLORIDE 20 MG/ML
INJECTION INTRAMUSCULAR; INTRAVENOUS
Status: DISCONTINUED | OUTPATIENT
Start: 2022-01-01 | End: 2022-01-01

## 2022-01-01 RX ORDER — GABAPENTIN 100 MG/1
100 CAPSULE ORAL NIGHTLY PRN
Status: DISCONTINUED | OUTPATIENT
Start: 2022-01-01 | End: 2022-01-01 | Stop reason: HOSPADM

## 2022-01-01 RX ORDER — MORPHINE SULFATE 2 MG/ML
2 INJECTION, SOLUTION INTRAMUSCULAR; INTRAVENOUS EVERY 6 HOURS PRN
Status: DISCONTINUED | OUTPATIENT
Start: 2022-01-01 | End: 2022-01-01

## 2022-01-01 RX ORDER — PROMETHAZINE HYDROCHLORIDE 25 MG/1
25 TABLET ORAL 2 TIMES DAILY PRN
Status: DISCONTINUED | OUTPATIENT
Start: 2022-01-01 | End: 2022-01-01

## 2022-01-01 RX ORDER — LEVOTHYROXINE SODIUM ANHYDROUS 100 UG/5ML
25 INJECTION, POWDER, LYOPHILIZED, FOR SOLUTION INTRAVENOUS DAILY
Status: DISCONTINUED | OUTPATIENT
Start: 2022-01-01 | End: 2022-01-01

## 2022-01-01 RX ORDER — OXYCODONE AND ACETAMINOPHEN 5; 325 MG/1; MG/1
1 TABLET ORAL EVERY 6 HOURS PRN
Qty: 12 TABLET | Refills: 0 | Status: SHIPPED | OUTPATIENT
Start: 2022-01-01

## 2022-01-01 RX ORDER — MEROPENEM AND SODIUM CHLORIDE 500 MG/50ML
500 INJECTION, SOLUTION INTRAVENOUS NIGHTLY
Status: DISCONTINUED | OUTPATIENT
Start: 2022-01-01 | End: 2022-01-01 | Stop reason: HOSPADM

## 2022-01-01 RX ORDER — ROPINIROLE 0.25 MG/1
0.5 TABLET, FILM COATED ORAL EVERY 8 HOURS
Status: DISCONTINUED | OUTPATIENT
Start: 2022-01-01 | End: 2022-01-01

## 2022-01-01 RX ORDER — ACETAMINOPHEN 325 MG/1
650 TABLET ORAL EVERY 6 HOURS PRN
Status: DISCONTINUED | OUTPATIENT
Start: 2022-01-01 | End: 2022-01-01 | Stop reason: HOSPADM

## 2022-01-01 RX ORDER — SODIUM CHLORIDE 9 MG/ML
INJECTION, SOLUTION INTRAVENOUS ONCE
Status: DISCONTINUED | OUTPATIENT
Start: 2022-01-01 | End: 2022-01-01

## 2022-01-01 RX ORDER — CEPHALEXIN 250 MG/1
500 CAPSULE ORAL EVERY 8 HOURS
Status: DISCONTINUED | OUTPATIENT
Start: 2022-01-01 | End: 2022-01-01 | Stop reason: HOSPADM

## 2022-01-01 RX ORDER — HYDROMORPHONE HYDROCHLORIDE 1 MG/ML
0.5 INJECTION, SOLUTION INTRAMUSCULAR; INTRAVENOUS; SUBCUTANEOUS EVERY 4 HOURS PRN
Status: DISCONTINUED | OUTPATIENT
Start: 2022-01-01 | End: 2022-01-01

## 2022-01-01 RX ORDER — IODIXANOL 320 MG/ML
100 INJECTION, SOLUTION INTRAVASCULAR
Status: COMPLETED | OUTPATIENT
Start: 2022-01-01 | End: 2022-01-01

## 2022-01-01 RX ORDER — ONDANSETRON 2 MG/ML
4 INJECTION INTRAMUSCULAR; INTRAVENOUS
Status: COMPLETED | OUTPATIENT
Start: 2022-01-01 | End: 2022-01-01

## 2022-01-01 RX ORDER — GUAIFENESIN 100 MG/5ML
200 SOLUTION ORAL EVERY 4 HOURS PRN
Status: DISCONTINUED | OUTPATIENT
Start: 2022-01-01 | End: 2022-01-01 | Stop reason: HOSPADM

## 2022-01-01 RX ORDER — POTASSIUM CHLORIDE 14.9 MG/ML
INJECTION INTRAVENOUS CONTINUOUS PRN
Status: DISCONTINUED | OUTPATIENT
Start: 2022-01-01 | End: 2022-01-01

## 2022-01-01 RX ORDER — LORAZEPAM 0.5 MG/1
0.5 TABLET ORAL NIGHTLY
Status: DISCONTINUED | OUTPATIENT
Start: 2022-01-01 | End: 2022-01-01

## 2022-01-01 RX ORDER — MEROPENEM AND SODIUM CHLORIDE 500 MG/50ML
500 INJECTION, SOLUTION INTRAVENOUS
Status: DISCONTINUED | OUTPATIENT
Start: 2022-01-01 | End: 2022-01-01 | Stop reason: HOSPADM

## 2022-01-01 RX ORDER — DIPHENHYDRAMINE HCL 25 MG
25 CAPSULE ORAL EVERY 4 HOURS PRN
Status: DISCONTINUED | OUTPATIENT
Start: 2022-01-01 | End: 2022-01-01 | Stop reason: HOSPADM

## 2022-01-01 RX ORDER — MEROPENEM AND SODIUM CHLORIDE 1 G/50ML
1 INJECTION, SOLUTION INTRAVENOUS
Status: COMPLETED | OUTPATIENT
Start: 2022-01-01 | End: 2022-01-01

## 2022-01-01 RX ORDER — MEROPENEM 500 MG/1
INJECTION, POWDER, FOR SOLUTION INTRAVENOUS
Status: ON HOLD | COMMUNITY
Start: 2022-01-01 | End: 2022-01-01 | Stop reason: HOSPADM

## 2022-01-01 RX ORDER — HYDROCODONE BITARTRATE AND ACETAMINOPHEN 5; 325 MG/1; MG/1
1 TABLET ORAL EVERY 8 HOURS PRN
Qty: 20 TABLET | Refills: 0 | Status: SHIPPED | OUTPATIENT
Start: 2022-01-01 | End: 2022-01-01 | Stop reason: SDUPTHER

## 2022-01-01 RX ORDER — ONDANSETRON HYDROCHLORIDE 2 MG/ML
INJECTION, SOLUTION INTRAMUSCULAR; INTRAVENOUS
Status: DISCONTINUED | OUTPATIENT
Start: 2022-01-01 | End: 2022-01-01

## 2022-01-01 RX ORDER — FENTANYL CITRATE 50 UG/ML
25 INJECTION, SOLUTION INTRAMUSCULAR; INTRAVENOUS EVERY 5 MIN PRN
Status: DISCONTINUED | OUTPATIENT
Start: 2022-01-01 | End: 2022-01-01 | Stop reason: HOSPADM

## 2022-01-01 RX ORDER — ROCURONIUM BROMIDE 10 MG/ML
INJECTION, SOLUTION INTRAVENOUS
Status: DISCONTINUED | OUTPATIENT
Start: 2022-01-01 | End: 2022-01-01

## 2022-01-01 RX ORDER — PHENYLEPHRINE HCL IN 0.9% NACL 1 MG/10 ML
SYRINGE (ML) INTRAVENOUS
Status: DISCONTINUED | OUTPATIENT
Start: 2022-01-01 | End: 2022-01-01

## 2022-01-01 RX ORDER — TALC
9 POWDER (GRAM) TOPICAL NIGHTLY PRN
Status: DISCONTINUED | OUTPATIENT
Start: 2022-01-01 | End: 2022-01-01 | Stop reason: HOSPADM

## 2022-01-01 RX ORDER — ENOXAPARIN SODIUM 100 MG/ML
40 INJECTION SUBCUTANEOUS EVERY 24 HOURS
Status: DISCONTINUED | OUTPATIENT
Start: 2022-01-01 | End: 2022-01-01

## 2022-01-01 RX ORDER — DIPHENHYDRAMINE HCL 25 MG
25 CAPSULE ORAL EVERY 12 HOURS PRN
Status: DISCONTINUED | OUTPATIENT
Start: 2022-01-01 | End: 2022-01-01

## 2022-01-01 RX ORDER — HEPARIN SODIUM 5000 [USP'U]/ML
5000 INJECTION, SOLUTION INTRAVENOUS; SUBCUTANEOUS EVERY 8 HOURS
Status: DISCONTINUED | OUTPATIENT
Start: 2022-01-01 | End: 2022-01-01 | Stop reason: HOSPADM

## 2022-01-01 RX ORDER — POLYETHYLENE GLYCOL 3350 17 G/17G
17 POWDER, FOR SOLUTION ORAL DAILY
Status: DISCONTINUED | OUTPATIENT
Start: 2022-01-01 | End: 2022-01-01

## 2022-01-01 RX ORDER — SODIUM CHLORIDE 9 MG/ML
INJECTION, SOLUTION INTRAVENOUS
Status: CANCELLED | OUTPATIENT
Start: 2022-01-01

## 2022-01-01 RX ORDER — ONDANSETRON 2 MG/ML
4 INJECTION INTRAMUSCULAR; INTRAVENOUS EVERY 12 HOURS PRN
Status: DISCONTINUED | OUTPATIENT
Start: 2022-01-01 | End: 2022-01-01

## 2022-01-01 RX ORDER — FERROUS SULFATE 325(65) MG
325 TABLET ORAL
COMMUNITY
Start: 2022-01-01

## 2022-01-01 RX ORDER — NOREPINEPHRINE BITARTRATE/D5W 4MG/250ML
0-3 PLASTIC BAG, INJECTION (ML) INTRAVENOUS CONTINUOUS
Status: DISCONTINUED | OUTPATIENT
Start: 2022-01-01 | End: 2022-01-01

## 2022-01-01 RX ORDER — BISACODYL 10 MG
10 SUPPOSITORY, RECTAL RECTAL DAILY PRN
Status: DISCONTINUED | OUTPATIENT
Start: 2022-01-01 | End: 2022-01-01 | Stop reason: HOSPADM

## 2022-01-01 RX ORDER — POTASSIUM CHLORIDE 7.45 MG/ML
40 INJECTION INTRAVENOUS ONCE
Status: DISCONTINUED | OUTPATIENT
Start: 2022-01-01 | End: 2022-01-01

## 2022-01-01 RX ORDER — HYDROCODONE BITARTRATE AND ACETAMINOPHEN 5; 325 MG/1; MG/1
1 TABLET ORAL EVERY 8 HOURS PRN
Qty: 20 TABLET | Refills: 0 | Status: SHIPPED | OUTPATIENT
Start: 2022-01-01 | End: 2022-01-01

## 2022-01-01 RX ORDER — MIRTAZAPINE 15 MG/1
7.5 TABLET, FILM COATED ORAL NIGHTLY
COMMUNITY
Start: 2022-01-01

## 2022-01-01 RX ORDER — VIT C/E/ZN/COPPR/LUTEIN/ZEAXAN 250MG-90MG
1000 CAPSULE ORAL DAILY
Qty: 10 CAPSULE | Refills: 0 | Status: SHIPPED | OUTPATIENT
Start: 2022-01-01

## 2022-01-01 RX ORDER — LEVOTHYROXINE SODIUM ANHYDROUS 100 UG/5ML
50 INJECTION, POWDER, LYOPHILIZED, FOR SOLUTION INTRAVENOUS
Status: DISCONTINUED | OUTPATIENT
Start: 2022-01-01 | End: 2022-01-01

## 2022-01-01 RX ORDER — SIMETHICONE 80 MG
80 TABLET,CHEWABLE ORAL EVERY 6 HOURS PRN
Status: DISCONTINUED | OUTPATIENT
Start: 2022-01-01 | End: 2022-01-01

## 2022-01-01 RX ORDER — ACETAMINOPHEN 325 MG/1
650 TABLET ORAL EVERY 4 HOURS PRN
Status: DISCONTINUED | OUTPATIENT
Start: 2022-01-01 | End: 2022-01-01 | Stop reason: HOSPADM

## 2022-01-01 RX ORDER — ROPINIROLE 0.25 MG/1
0.25 TABLET, FILM COATED ORAL 3 TIMES DAILY
Status: DISCONTINUED | OUTPATIENT
Start: 2022-01-01 | End: 2022-01-01 | Stop reason: HOSPADM

## 2022-01-01 RX ORDER — PROCHLORPERAZINE EDISYLATE 5 MG/ML
5 INJECTION INTRAMUSCULAR; INTRAVENOUS EVERY 30 MIN PRN
Status: DISCONTINUED | OUTPATIENT
Start: 2022-01-01 | End: 2022-01-01 | Stop reason: HOSPADM

## 2022-01-01 RX ORDER — SODIUM CHLORIDE 9 MG/ML
INJECTION, SOLUTION INTRAVENOUS CONTINUOUS PRN
Status: DISCONTINUED | OUTPATIENT
Start: 2022-01-01 | End: 2022-01-01

## 2022-01-01 RX ORDER — NYSTATIN AND TRIAMCINOLONE ACETONIDE 100000; 1 [USP'U]/G; MG/G
CREAM TOPICAL 4 TIMES DAILY
Qty: 30 G | Refills: 0 | Status: SHIPPED | OUTPATIENT
Start: 2022-01-01 | End: 2022-01-01

## 2022-01-01 RX ORDER — POTASSIUM CHLORIDE 7.45 MG/ML
10 INJECTION INTRAVENOUS
Status: COMPLETED | OUTPATIENT
Start: 2022-01-01 | End: 2022-01-01

## 2022-01-01 RX ORDER — METHYLPREDNISOLONE 4 MG/1
TABLET ORAL
COMMUNITY
Start: 2022-01-01 | End: 2022-01-01

## 2022-01-01 RX ORDER — SIMETHICONE 80 MG
1 TABLET,CHEWABLE ORAL 4 TIMES DAILY PRN
Status: DISCONTINUED | OUTPATIENT
Start: 2022-01-01 | End: 2022-01-01

## 2022-01-01 RX ORDER — TRAMADOL HYDROCHLORIDE 50 MG/1
50 TABLET ORAL 4 TIMES DAILY PRN
Status: DISCONTINUED | OUTPATIENT
Start: 2022-01-01 | End: 2022-01-01

## 2022-01-01 RX ORDER — ROPINIROLE 0.25 MG/1
0.25 TABLET, FILM COATED ORAL 3 TIMES DAILY
COMMUNITY
Start: 2022-01-01 | End: 2022-01-01

## 2022-01-01 RX ORDER — PROMETHAZINE HYDROCHLORIDE 25 MG/1
25 TABLET ORAL 2 TIMES DAILY PRN
Status: DISCONTINUED | OUTPATIENT
Start: 2022-01-01 | End: 2022-01-01 | Stop reason: HOSPADM

## 2022-01-01 RX ORDER — OXYCODONE AND ACETAMINOPHEN 5; 325 MG/1; MG/1
1 TABLET ORAL
Status: COMPLETED | OUTPATIENT
Start: 2022-01-01 | End: 2022-01-01

## 2022-01-01 RX ORDER — CEFUROXIME AXETIL 250 MG/1
250 TABLET ORAL 2 TIMES DAILY
Status: ON HOLD | COMMUNITY
Start: 2022-01-01 | End: 2022-01-01 | Stop reason: HOSPADM

## 2022-01-01 RX ORDER — CHLORHEXIDINE GLUCONATE ORAL RINSE 1.2 MG/ML
15 SOLUTION DENTAL 2 TIMES DAILY
Status: COMPLETED | OUTPATIENT
Start: 2022-01-01 | End: 2022-01-01

## 2022-01-01 RX ORDER — ONDANSETRON 2 MG/ML
4 INJECTION INTRAMUSCULAR; INTRAVENOUS DAILY PRN
Status: DISCONTINUED | OUTPATIENT
Start: 2022-01-01 | End: 2022-01-01 | Stop reason: HOSPADM

## 2022-01-01 RX ORDER — POTASSIUM CHLORIDE 14.9 MG/ML
40 INJECTION INTRAVENOUS ONCE
Status: COMPLETED | OUTPATIENT
Start: 2022-01-01 | End: 2022-01-01

## 2022-01-01 RX ORDER — LEVOFLOXACIN 5 MG/ML
750 INJECTION, SOLUTION INTRAVENOUS ONCE
Status: COMPLETED | OUTPATIENT
Start: 2022-01-01 | End: 2022-01-01

## 2022-01-01 RX ORDER — MORPHINE SULFATE 2 MG/ML
2 INJECTION, SOLUTION INTRAMUSCULAR; INTRAVENOUS ONCE
Status: COMPLETED | OUTPATIENT
Start: 2022-01-01 | End: 2022-01-01

## 2022-01-01 RX ORDER — SEVELAMER CARBONATE 800 MG/1
800 TABLET, FILM COATED ORAL
Status: DISCONTINUED | OUTPATIENT
Start: 2022-01-01 | End: 2022-01-01 | Stop reason: HOSPADM

## 2022-01-01 RX ORDER — LIDOCAINE HYDROCHLORIDE 20 MG/ML
5 JELLY TOPICAL
Status: DISCONTINUED | OUTPATIENT
Start: 2022-01-01 | End: 2022-01-01 | Stop reason: HOSPADM

## 2022-01-01 RX ORDER — ENOXAPARIN SODIUM 100 MG/ML
1 INJECTION SUBCUTANEOUS 2 TIMES DAILY
Status: DISCONTINUED | OUTPATIENT
Start: 2022-01-01 | End: 2022-01-01

## 2022-01-01 RX ORDER — SODIUM CHLORIDE 0.9 % (FLUSH) 0.9 %
10 SYRINGE (ML) INJECTION
Status: DISCONTINUED | OUTPATIENT
Start: 2022-01-01 | End: 2022-01-01

## 2022-01-01 RX ORDER — MUPIROCIN 20 MG/G
OINTMENT TOPICAL 2 TIMES DAILY
Status: COMPLETED | OUTPATIENT
Start: 2022-01-01 | End: 2022-01-01

## 2022-01-01 RX ORDER — METOPROLOL TARTRATE 1 MG/ML
INJECTION, SOLUTION INTRAVENOUS
Status: COMPLETED
Start: 2022-01-01 | End: 2022-01-01

## 2022-01-01 RX ORDER — OXYCODONE AND ACETAMINOPHEN 10; 325 MG/1; MG/1
1 TABLET ORAL EVERY 4 HOURS PRN
Status: DISCONTINUED | OUTPATIENT
Start: 2022-01-01 | End: 2022-01-01 | Stop reason: HOSPADM

## 2022-01-01 RX ORDER — OXYCODONE HYDROCHLORIDE 5 MG/1
5 CAPSULE ORAL
COMMUNITY
Start: 2022-01-01

## 2022-01-01 RX ORDER — LORAZEPAM 0.5 MG/1
0.5 TABLET ORAL EVERY 6 HOURS PRN
Qty: 20 TABLET | Refills: 0 | Status: SHIPPED | OUTPATIENT
Start: 2022-01-01 | End: 2022-01-01 | Stop reason: SDUPTHER

## 2022-01-01 RX ORDER — LORAZEPAM 0.5 MG/1
0.5 TABLET ORAL NIGHTLY
Status: DISCONTINUED | OUTPATIENT
Start: 2022-01-01 | End: 2022-01-01 | Stop reason: HOSPADM

## 2022-01-01 RX ORDER — ONDANSETRON 4 MG/1
TABLET, ORALLY DISINTEGRATING ORAL DAILY PRN
COMMUNITY
Start: 2022-01-01 | End: 2022-01-01

## 2022-01-01 RX ORDER — LEVOFLOXACIN 250 MG/1
250 TABLET ORAL DAILY
Qty: 5 TABLET | Refills: 0 | Status: SHIPPED | OUTPATIENT
Start: 2022-01-01 | End: 2022-01-01

## 2022-01-01 RX ORDER — MAGNESIUM SULFATE 1 G/100ML
1 INJECTION INTRAVENOUS
Status: DISCONTINUED | OUTPATIENT
Start: 2022-01-01 | End: 2022-01-01 | Stop reason: HOSPADM

## 2022-01-01 RX ORDER — MORPHINE SULFATE 4 MG/ML
4 INJECTION, SOLUTION INTRAMUSCULAR; INTRAVENOUS
Status: DISPENSED | OUTPATIENT
Start: 2022-01-01 | End: 2022-01-01

## 2022-01-01 RX ORDER — LEVOTHYROXINE SODIUM 88 UG/1
88 TABLET ORAL
Status: DISCONTINUED | OUTPATIENT
Start: 2022-01-01 | End: 2022-01-01

## 2022-01-01 RX ORDER — LANOLIN ALCOHOL/MO/W.PET/CERES
800 CREAM (GRAM) TOPICAL
Status: DISCONTINUED | OUTPATIENT
Start: 2022-01-01 | End: 2022-01-01 | Stop reason: HOSPADM

## 2022-01-01 RX ORDER — FENTANYL CITRATE 50 UG/ML
INJECTION, SOLUTION INTRAMUSCULAR; INTRAVENOUS
Status: DISCONTINUED | OUTPATIENT
Start: 2022-01-01 | End: 2022-01-01 | Stop reason: HOSPADM

## 2022-01-01 RX ORDER — MEROPENEM AND SODIUM CHLORIDE 500 MG/50ML
500 INJECTION, SOLUTION INTRAVENOUS NIGHTLY
Status: ON HOLD
Start: 2022-01-01 | End: 2022-01-01 | Stop reason: HOSPADM

## 2022-01-01 RX ORDER — HYDROXYZINE HYDROCHLORIDE 25 MG/1
25 TABLET, FILM COATED ORAL 3 TIMES DAILY PRN
Status: DISCONTINUED | OUTPATIENT
Start: 2022-01-01 | End: 2022-01-01 | Stop reason: HOSPADM

## 2022-01-01 RX ORDER — DOXYCYCLINE 100 MG/1
100 CAPSULE ORAL EVERY 12 HOURS
Status: DISCONTINUED | OUTPATIENT
Start: 2022-01-01 | End: 2022-01-01 | Stop reason: HOSPADM

## 2022-01-01 RX ORDER — POTASSIUM CHLORIDE 7.45 MG/ML
20 INJECTION INTRAVENOUS ONCE
Status: COMPLETED | OUTPATIENT
Start: 2022-01-01 | End: 2022-01-01

## 2022-01-01 RX ORDER — DOXYCYCLINE 100 MG/1
100 CAPSULE ORAL EVERY 12 HOURS
Status: ON HOLD | COMMUNITY
End: 2022-01-01 | Stop reason: HOSPADM

## 2022-01-01 RX ORDER — ERGOCALCIFEROL 1.25 MG/1
50000 CAPSULE ORAL
COMMUNITY
Start: 2022-01-01

## 2022-01-01 RX ORDER — HALOPERIDOL 5 MG/ML
5 INJECTION INTRAMUSCULAR EVERY 8 HOURS
Status: DISCONTINUED | OUTPATIENT
Start: 2022-01-01 | End: 2022-01-01

## 2022-01-01 RX ORDER — DOXYLAMINE SUCCINATE 25 MG
TABLET ORAL 2 TIMES DAILY
Status: DISCONTINUED | OUTPATIENT
Start: 2022-01-01 | End: 2022-01-01 | Stop reason: HOSPADM

## 2022-01-01 RX ORDER — MIDODRINE HYDROCHLORIDE 2.5 MG/1
10 TABLET ORAL 3 TIMES DAILY
Status: DISCONTINUED | OUTPATIENT
Start: 2022-01-01 | End: 2022-01-01

## 2022-01-01 RX ORDER — DEXAMETHASONE 6 MG/1
6 TABLET ORAL DAILY
Qty: 8 TABLET | Refills: 0 | Status: SHIPPED | OUTPATIENT
Start: 2022-01-01 | End: 2022-01-01 | Stop reason: HOSPADM

## 2022-01-01 RX ORDER — ACETAMINOPHEN 500 MG
1000 TABLET ORAL DAILY PRN
COMMUNITY
End: 2022-01-01

## 2022-01-01 RX ORDER — DILTIAZEM HYDROCHLORIDE 120 MG/1
120 CAPSULE, COATED, EXTENDED RELEASE ORAL DAILY
Qty: 90 CAPSULE | Refills: 3 | Status: SHIPPED | OUTPATIENT
Start: 2022-01-01

## 2022-01-01 RX ORDER — MIDODRINE HYDROCHLORIDE 2.5 MG/1
2.5 TABLET ORAL 3 TIMES DAILY
Status: DISCONTINUED | OUTPATIENT
Start: 2022-01-01 | End: 2022-01-01 | Stop reason: HOSPADM

## 2022-01-01 RX ORDER — LACTULOSE 10 G/15ML
20 SOLUTION ORAL 2 TIMES DAILY
Status: DISCONTINUED | OUTPATIENT
Start: 2022-01-01 | End: 2022-01-01 | Stop reason: HOSPADM

## 2022-01-01 RX ORDER — HYDROXYZINE HYDROCHLORIDE 10 MG/1
10 TABLET, FILM COATED ORAL 3 TIMES DAILY PRN
Qty: 15 TABLET | Refills: 2 | Status: SHIPPED | OUTPATIENT
Start: 2022-01-01 | End: 2022-01-01

## 2022-01-01 RX ORDER — HYDRALAZINE HYDROCHLORIDE 25 MG/1
25 TABLET, FILM COATED ORAL EVERY 8 HOURS
Status: DISCONTINUED | OUTPATIENT
Start: 2022-01-01 | End: 2022-01-01

## 2022-01-01 RX ORDER — DOXYCYCLINE HYCLATE 100 MG
100 TABLET ORAL 2 TIMES DAILY
Status: ON HOLD | COMMUNITY
Start: 2022-01-01 | End: 2022-01-01 | Stop reason: HOSPADM

## 2022-01-01 RX ORDER — MAGNESIUM SULFATE HEPTAHYDRATE 40 MG/ML
2 INJECTION, SOLUTION INTRAVENOUS ONCE
Status: COMPLETED | OUTPATIENT
Start: 2022-01-01 | End: 2022-01-01

## 2022-01-01 RX ORDER — OXYCODONE HYDROCHLORIDE 5 MG/1
5 TABLET ORAL EVERY 4 HOURS PRN
Qty: 20 TABLET | Refills: 0 | Status: SHIPPED | OUTPATIENT
Start: 2022-01-01 | End: 2022-01-01

## 2022-01-01 RX ORDER — CALCIUM GLUCONATE 20 MG/ML
1 INJECTION, SOLUTION INTRAVENOUS
Status: COMPLETED | OUTPATIENT
Start: 2022-01-01 | End: 2022-01-01

## 2022-01-01 RX ORDER — METRONIDAZOLE 500 MG/100ML
500 INJECTION, SOLUTION INTRAVENOUS
Status: COMPLETED | OUTPATIENT
Start: 2022-01-01 | End: 2022-01-01

## 2022-01-01 RX ORDER — LIDOCAINE HYDROCHLORIDE 10 MG/ML
0.5 INJECTION, SOLUTION EPIDURAL; INFILTRATION; INTRACAUDAL; PERINEURAL ONCE
Status: DISCONTINUED | OUTPATIENT
Start: 2022-01-01 | End: 2022-01-01 | Stop reason: HOSPADM

## 2022-01-01 RX ORDER — METOPROLOL TARTRATE 1 MG/ML
2.5 INJECTION, SOLUTION INTRAVENOUS ONCE
Status: COMPLETED | OUTPATIENT
Start: 2022-01-01 | End: 2022-01-01

## 2022-01-01 RX ORDER — DOXYCYCLINE 100 MG/1
100 CAPSULE ORAL EVERY 12 HOURS
Status: DISCONTINUED | OUTPATIENT
Start: 2022-01-01 | End: 2022-01-01

## 2022-01-01 RX ORDER — HEPARIN SODIUM 1000 [USP'U]/ML
INJECTION, SOLUTION INTRAVENOUS; SUBCUTANEOUS
Status: DISCONTINUED | OUTPATIENT
Start: 2022-01-01 | End: 2022-01-01 | Stop reason: HOSPADM

## 2022-01-01 RX ORDER — LIDOCAINE AND PRILOCAINE 25; 25 MG/G; MG/G
CREAM TOPICAL ONCE
Status: COMPLETED | OUTPATIENT
Start: 2022-01-01 | End: 2022-01-01

## 2022-01-01 RX ORDER — LIDOCAINE HYDROCHLORIDE 10 MG/ML
INJECTION, SOLUTION EPIDURAL; INFILTRATION; INTRACAUDAL; PERINEURAL
Status: DISCONTINUED | OUTPATIENT
Start: 2022-01-01 | End: 2022-01-01 | Stop reason: HOSPADM

## 2022-01-01 RX ORDER — MIRTAZAPINE 7.5 MG/1
7.5 TABLET, FILM COATED ORAL NIGHTLY
Status: DISCONTINUED | OUTPATIENT
Start: 2022-01-01 | End: 2022-01-01

## 2022-01-01 RX ORDER — HYDRALAZINE HYDROCHLORIDE 50 MG/1
50 TABLET, FILM COATED ORAL EVERY 8 HOURS
Qty: 270 TABLET | Refills: 0 | Status: SHIPPED | OUTPATIENT
Start: 2022-01-01 | End: 2022-01-01 | Stop reason: SDUPTHER

## 2022-01-01 RX ORDER — GENTAMICIN SULFATE 80 MG/100ML
80 INJECTION, SOLUTION INTRAVENOUS
Status: DISCONTINUED | OUTPATIENT
Start: 2022-01-01 | End: 2022-01-01 | Stop reason: HOSPADM

## 2022-01-01 RX ORDER — SERTRALINE HYDROCHLORIDE 50 MG/1
50 TABLET, FILM COATED ORAL NIGHTLY
Status: DISCONTINUED | OUTPATIENT
Start: 2022-01-01 | End: 2022-01-01

## 2022-01-01 RX ORDER — HYDROCODONE BITARTRATE AND ACETAMINOPHEN 5; 325 MG/1; MG/1
1 TABLET ORAL EVERY 6 HOURS PRN
Qty: 10 TABLET | Refills: 0 | Status: SHIPPED | OUTPATIENT
Start: 2022-01-01 | End: 2022-01-01

## 2022-01-01 RX ORDER — ACETAMINOPHEN 325 MG/1
650 TABLET ORAL
Status: COMPLETED | OUTPATIENT
Start: 2022-01-01 | End: 2022-01-01

## 2022-01-01 RX ORDER — MORPHINE SULFATE 4 MG/ML
4 INJECTION, SOLUTION INTRAMUSCULAR; INTRAVENOUS
Status: DISCONTINUED | OUTPATIENT
Start: 2022-01-01 | End: 2022-01-01 | Stop reason: HOSPADM

## 2022-01-01 RX ORDER — POTASSIUM CHLORIDE 20 MEQ/1
20 TABLET, EXTENDED RELEASE ORAL ONCE
Status: COMPLETED | OUTPATIENT
Start: 2022-01-01 | End: 2022-01-01

## 2022-01-01 RX ORDER — METRONIDAZOLE 500 MG/100ML
500 INJECTION, SOLUTION INTRAVENOUS
Status: DISCONTINUED | OUTPATIENT
Start: 2022-01-01 | End: 2022-01-01

## 2022-01-01 RX ORDER — FLUCONAZOLE 2 MG/ML
100 INJECTION, SOLUTION INTRAVENOUS
Status: DISCONTINUED | OUTPATIENT
Start: 2022-01-01 | End: 2022-01-01

## 2022-01-01 RX ORDER — CALCIUM GLUCONATE 20 MG/ML
2 INJECTION, SOLUTION INTRAVENOUS
Status: DISCONTINUED | OUTPATIENT
Start: 2022-01-01 | End: 2022-01-01

## 2022-01-01 RX ORDER — SODIUM CHLORIDE 0.9 G/100ML
IRRIGANT IRRIGATION
Status: DISCONTINUED | OUTPATIENT
Start: 2022-01-01 | End: 2022-01-01 | Stop reason: HOSPADM

## 2022-01-01 RX ORDER — METOCLOPRAMIDE HYDROCHLORIDE 5 MG/ML
5 INJECTION INTRAMUSCULAR; INTRAVENOUS EVERY 6 HOURS PRN
Status: DISCONTINUED | OUTPATIENT
Start: 2022-01-01 | End: 2022-01-01 | Stop reason: HOSPADM

## 2022-01-01 RX ORDER — LIDOCAINE HYDROCHLORIDE 20 MG/ML
INJECTION, SOLUTION EPIDURAL; INFILTRATION; INTRACAUDAL; PERINEURAL
Status: DISCONTINUED | OUTPATIENT
Start: 2022-01-01 | End: 2022-01-01

## 2022-01-01 RX ORDER — ONDANSETRON 2 MG/ML
4 INJECTION INTRAMUSCULAR; INTRAVENOUS DAILY PRN
Status: DISCONTINUED | OUTPATIENT
Start: 2022-01-01 | End: 2022-01-01

## 2022-01-01 RX ORDER — LORAZEPAM 2 MG/ML
2 INJECTION INTRAMUSCULAR
Status: DISCONTINUED | OUTPATIENT
Start: 2022-01-01 | End: 2022-01-01 | Stop reason: HOSPADM

## 2022-01-01 RX ORDER — TRAMADOL HYDROCHLORIDE 50 MG/1
1 TABLET ORAL 4 TIMES DAILY PRN
COMMUNITY
Start: 2022-01-01

## 2022-01-01 RX ORDER — DIPHENHYDRAMINE HYDROCHLORIDE 50 MG/ML
12.5 INJECTION INTRAMUSCULAR; INTRAVENOUS ONCE AS NEEDED
Status: COMPLETED | OUTPATIENT
Start: 2022-01-01 | End: 2022-01-01

## 2022-01-01 RX ORDER — ONDANSETRON 4 MG/1
8 TABLET, ORALLY DISINTEGRATING ORAL EVERY 8 HOURS PRN
Status: DISCONTINUED | OUTPATIENT
Start: 2022-01-01 | End: 2022-01-01 | Stop reason: HOSPADM

## 2022-01-01 RX ORDER — MIDAZOLAM HYDROCHLORIDE 1 MG/ML
INJECTION INTRAMUSCULAR; INTRAVENOUS
Status: DISCONTINUED | OUTPATIENT
Start: 2022-01-01 | End: 2022-01-01 | Stop reason: HOSPADM

## 2022-01-01 RX ORDER — HEPARIN SODIUM 5000 [USP'U]/ML
5000 INJECTION, SOLUTION INTRAVENOUS; SUBCUTANEOUS
Status: CANCELLED | OUTPATIENT
Start: 2022-01-01

## 2022-01-01 RX ORDER — LORAZEPAM 0.5 MG/1
0.5 TABLET ORAL NIGHTLY
Qty: 90 TABLET | Refills: 1 | Status: SHIPPED | OUTPATIENT
Start: 2022-01-01 | End: 2024-06-14

## 2022-01-01 RX ORDER — LORAZEPAM 0.5 MG/1
0.5 TABLET ORAL NIGHTLY
Qty: 90 TABLET | Refills: 1 | Status: SHIPPED | OUTPATIENT
Start: 2022-01-01 | End: 2022-01-01 | Stop reason: SDUPTHER

## 2022-01-01 RX ORDER — POTASSIUM CHLORIDE 20 MEQ/1
40 TABLET, EXTENDED RELEASE ORAL ONCE
Status: COMPLETED | OUTPATIENT
Start: 2022-01-01 | End: 2022-01-01

## 2022-01-01 RX ORDER — CALCIUM GLUCONATE 20 MG/ML
1 INJECTION, SOLUTION INTRAVENOUS
Status: DISCONTINUED | OUTPATIENT
Start: 2022-01-01 | End: 2022-01-01

## 2022-01-01 RX ORDER — HYDRALAZINE HYDROCHLORIDE 20 MG/ML
10 INJECTION INTRAMUSCULAR; INTRAVENOUS EVERY 6 HOURS PRN
Status: DISCONTINUED | OUTPATIENT
Start: 2022-01-01 | End: 2022-01-01 | Stop reason: HOSPADM

## 2022-01-01 RX ORDER — ACETAMINOPHEN 500 MG
500 TABLET ORAL
Status: COMPLETED | OUTPATIENT
Start: 2022-01-01 | End: 2022-01-01

## 2022-01-01 RX ORDER — CEFEPIME HYDROCHLORIDE 1 G/50ML
1 INJECTION, SOLUTION INTRAVENOUS
Status: COMPLETED | OUTPATIENT
Start: 2022-01-01 | End: 2022-01-01

## 2022-01-01 RX ORDER — MAGNESIUM SULFATE HEPTAHYDRATE 40 MG/ML
4 INJECTION, SOLUTION INTRAVENOUS
Status: DISCONTINUED | OUTPATIENT
Start: 2022-01-01 | End: 2022-01-01

## 2022-01-01 RX ORDER — POTASSIUM CHLORIDE 14.9 MG/ML
40 INJECTION INTRAVENOUS
Status: DISCONTINUED | OUTPATIENT
Start: 2022-01-01 | End: 2022-01-01 | Stop reason: HOSPADM

## 2022-01-01 RX ORDER — HYDROCODONE BITARTRATE AND ACETAMINOPHEN 5; 325 MG/1; MG/1
1 TABLET ORAL EVERY 6 HOURS PRN
Qty: 7 TABLET | Refills: 0 | Status: SHIPPED | OUTPATIENT
Start: 2022-01-01 | End: 2022-01-01

## 2022-01-01 RX ORDER — HYDRALAZINE HYDROCHLORIDE 50 MG/1
50 TABLET, FILM COATED ORAL EVERY 8 HOURS
Qty: 270 TABLET | Refills: 0 | Status: SHIPPED | OUTPATIENT
Start: 2022-01-01 | End: 2023-08-29

## 2022-01-01 RX ORDER — HYDROMORPHONE HYDROCHLORIDE 1 MG/ML
0.5 INJECTION, SOLUTION INTRAMUSCULAR; INTRAVENOUS; SUBCUTANEOUS EVERY 6 HOURS PRN
Status: DISCONTINUED | OUTPATIENT
Start: 2022-01-01 | End: 2022-01-01

## 2022-01-01 RX ORDER — SIMETHICONE 80 MG
80 TABLET,CHEWABLE ORAL EVERY 6 HOURS PRN
COMMUNITY
Start: 2022-01-01

## 2022-01-01 RX ORDER — HYDROCODONE BITARTRATE AND ACETAMINOPHEN 5; 325 MG/1; MG/1
1 TABLET ORAL EVERY 4 HOURS PRN
Status: DISCONTINUED | OUTPATIENT
Start: 2022-01-01 | End: 2022-01-01

## 2022-01-01 RX ORDER — HYDROMORPHONE HYDROCHLORIDE 1 MG/ML
INJECTION, SOLUTION INTRAMUSCULAR; INTRAVENOUS; SUBCUTANEOUS
Status: COMPLETED
Start: 2022-01-01 | End: 2022-01-01

## 2022-01-01 RX ORDER — DOXYCYCLINE 100 MG/1
100 CAPSULE ORAL EVERY 12 HOURS
Qty: 14 CAPSULE | Refills: 0
Start: 2022-01-01 | End: 2022-01-01

## 2022-01-01 RX ORDER — CYCLOBENZAPRINE HCL 10 MG
10 TABLET ORAL 3 TIMES DAILY PRN
Qty: 30 TABLET | Refills: 1 | Status: SHIPPED | OUTPATIENT
Start: 2022-01-01 | End: 2022-01-01

## 2022-01-01 RX ORDER — VANCOMYCIN HCL IN 5 % DEXTROSE 1G/250ML
1000 PLASTIC BAG, INJECTION (ML) INTRAVENOUS ONCE
Status: DISCONTINUED | OUTPATIENT
Start: 2022-01-01 | End: 2022-01-01

## 2022-01-01 RX ORDER — LIDOCAINE HYDROCHLORIDE 10 MG/ML
1 INJECTION, SOLUTION EPIDURAL; INFILTRATION; INTRACAUDAL; PERINEURAL ONCE
Status: DISCONTINUED | OUTPATIENT
Start: 2022-01-01 | End: 2022-01-01 | Stop reason: HOSPADM

## 2022-01-01 RX ORDER — ROPINIROLE 0.5 MG/1
0.5 TABLET, FILM COATED ORAL EVERY 8 HOURS
COMMUNITY
Start: 2022-01-01

## 2022-01-01 RX ORDER — LIDOCAINE HYDROCHLORIDE 20 MG/ML
JELLY TOPICAL
Qty: 30 ML | Refills: 0 | Status: SHIPPED | OUTPATIENT
Start: 2022-01-01 | End: 2022-01-01

## 2022-01-01 RX ORDER — OXYCODONE HYDROCHLORIDE 5 MG/1
10 TABLET ORAL EVERY 4 HOURS PRN
Status: DISCONTINUED | OUTPATIENT
Start: 2022-01-01 | End: 2022-01-01 | Stop reason: HOSPADM

## 2022-01-01 RX ORDER — DEXAMETHASONE SODIUM PHOSPHATE 4 MG/ML
INJECTION, SOLUTION INTRA-ARTICULAR; INTRALESIONAL; INTRAMUSCULAR; INTRAVENOUS; SOFT TISSUE
Status: DISCONTINUED | OUTPATIENT
Start: 2022-01-01 | End: 2022-01-01

## 2022-01-01 RX ORDER — MIDODRINE HYDROCHLORIDE 5 MG/1
5 TABLET ORAL 3 TIMES DAILY
COMMUNITY
Start: 2022-01-01

## 2022-01-01 RX ORDER — CALCIUM GLUCONATE 20 MG/ML
2 INJECTION, SOLUTION INTRAVENOUS
Status: DISCONTINUED | OUTPATIENT
Start: 2022-01-01 | End: 2022-01-01 | Stop reason: HOSPADM

## 2022-01-01 RX ORDER — LEVOFLOXACIN 250 MG/1
250 TABLET ORAL DAILY
Status: DISCONTINUED | OUTPATIENT
Start: 2022-01-01 | End: 2022-01-01 | Stop reason: HOSPADM

## 2022-01-01 RX ORDER — SERTRALINE HYDROCHLORIDE 50 MG/1
50 TABLET, FILM COATED ORAL NIGHTLY
Qty: 90 TABLET | Refills: 1 | Status: SHIPPED | OUTPATIENT
Start: 2022-01-01 | End: 2022-01-01 | Stop reason: SDUPTHER

## 2022-01-01 RX ORDER — AMOXICILLIN 250 MG
2 CAPSULE ORAL 2 TIMES DAILY PRN
Status: DISCONTINUED | OUTPATIENT
Start: 2022-01-01 | End: 2022-01-01 | Stop reason: HOSPADM

## 2022-01-01 RX ORDER — IPRATROPIUM BROMIDE AND ALBUTEROL SULFATE 2.5; .5 MG/3ML; MG/3ML
3 SOLUTION RESPIRATORY (INHALATION)
Status: DISCONTINUED | OUTPATIENT
Start: 2022-01-01 | End: 2022-01-01 | Stop reason: HOSPADM

## 2022-01-01 RX ORDER — LOPERAMIDE HYDROCHLORIDE 2 MG/1
2 CAPSULE ORAL 4 TIMES DAILY PRN
Status: DISCONTINUED | OUTPATIENT
Start: 2022-01-01 | End: 2022-01-01 | Stop reason: HOSPADM

## 2022-01-01 RX ORDER — SERTRALINE HYDROCHLORIDE 50 MG/1
50 TABLET, FILM COATED ORAL NIGHTLY
Qty: 90 TABLET | Refills: 1 | Status: SHIPPED | OUTPATIENT
Start: 2022-01-01

## 2022-01-01 RX ORDER — ACETAMINOPHEN 325 MG/1
650 TABLET ORAL EVERY 8 HOURS PRN
Status: DISCONTINUED | OUTPATIENT
Start: 2022-01-01 | End: 2022-01-01

## 2022-01-01 RX ORDER — POTASSIUM CHLORIDE 29.8 MG/ML
40 INJECTION INTRAVENOUS
Status: DISCONTINUED | OUTPATIENT
Start: 2022-01-01 | End: 2022-01-01

## 2022-01-01 RX ORDER — DRONABINOL 2.5 MG/1
2.5 CAPSULE ORAL
COMMUNITY
Start: 2022-01-01

## 2022-01-01 RX ORDER — HYDROMORPHONE HYDROCHLORIDE 1 MG/ML
1 INJECTION, SOLUTION INTRAMUSCULAR; INTRAVENOUS; SUBCUTANEOUS EVERY 6 HOURS PRN
Status: DISCONTINUED | OUTPATIENT
Start: 2022-01-01 | End: 2022-01-01 | Stop reason: HOSPADM

## 2022-01-01 RX ORDER — OXYCODONE HYDROCHLORIDE 5 MG/1
5 TABLET ORAL ONCE AS NEEDED
Status: COMPLETED | OUTPATIENT
Start: 2022-01-01 | End: 2022-01-01

## 2022-01-01 RX ORDER — HYDROCODONE BITARTRATE AND ACETAMINOPHEN 5; 325 MG/1; MG/1
1 TABLET ORAL EVERY 6 HOURS PRN
Qty: 8 TABLET | Refills: 0 | Status: SHIPPED | OUTPATIENT
Start: 2022-01-01 | End: 2022-01-01

## 2022-01-01 RX ORDER — HYDROMORPHONE HYDROCHLORIDE 1 MG/ML
1 INJECTION, SOLUTION INTRAMUSCULAR; INTRAVENOUS; SUBCUTANEOUS
Status: COMPLETED | OUTPATIENT
Start: 2022-01-01 | End: 2022-01-01

## 2022-01-01 RX ORDER — LEVOTHYROXINE SODIUM 88 UG/1
TABLET ORAL
Qty: 90 TABLET | Refills: 3 | Status: SHIPPED | OUTPATIENT
Start: 2022-01-01

## 2022-01-01 RX ORDER — CHOLESTYRAMINE 4 G/9G
4 POWDER, FOR SUSPENSION ORAL 2 TIMES DAILY
COMMUNITY
Start: 2022-01-01

## 2022-01-01 RX ORDER — LIDOCAINE AND PRILOCAINE 25; 25 MG/G; MG/G
CREAM TOPICAL
Status: DISCONTINUED | OUTPATIENT
Start: 2022-01-01 | End: 2022-01-01 | Stop reason: HOSPADM

## 2022-01-01 RX ORDER — MORPHINE SULFATE 2 MG/ML
2 INJECTION, SOLUTION INTRAMUSCULAR; INTRAVENOUS
Status: COMPLETED | OUTPATIENT
Start: 2022-01-01 | End: 2022-01-01

## 2022-01-01 RX ORDER — MAGNESIUM SULFATE HEPTAHYDRATE 40 MG/ML
2 INJECTION, SOLUTION INTRAVENOUS
Status: DISCONTINUED | OUTPATIENT
Start: 2022-01-01 | End: 2022-01-01

## 2022-01-01 RX ORDER — ONDANSETRON 2 MG/ML
4 INJECTION INTRAMUSCULAR; INTRAVENOUS EVERY 6 HOURS PRN
Status: DISCONTINUED | OUTPATIENT
Start: 2022-01-01 | End: 2022-01-01

## 2022-01-01 RX ORDER — SODIUM CHLORIDE 9 MG/ML
INJECTION, SOLUTION INTRAVENOUS ONCE
Status: CANCELLED | OUTPATIENT
Start: 2022-01-01 | End: 2022-01-01

## 2022-01-01 RX ORDER — CEFOXITIN SODIUM 2 G/50ML
2 INJECTION, SOLUTION INTRAVENOUS
Status: COMPLETED | OUTPATIENT
Start: 2022-01-01 | End: 2022-01-01

## 2022-01-01 RX ORDER — NOREPINEPHRINE BITARTRATE/D5W 8 MG/250ML
0-3 PLASTIC BAG, INJECTION (ML) INTRAVENOUS CONTINUOUS
Status: DISCONTINUED | OUTPATIENT
Start: 2022-01-01 | End: 2022-01-01

## 2022-01-01 RX ORDER — LIDOCAINE HYDROCHLORIDE 20 MG/ML
INJECTION INTRAVENOUS
Status: DISCONTINUED | OUTPATIENT
Start: 2022-01-01 | End: 2022-01-01

## 2022-01-01 RX ORDER — FLUCONAZOLE 100 MG/1
100 TABLET ORAL NIGHTLY
Status: DISCONTINUED | OUTPATIENT
Start: 2022-01-01 | End: 2022-01-01 | Stop reason: HOSPADM

## 2022-01-01 RX ORDER — MEPERIDINE HYDROCHLORIDE 50 MG/ML
12.5 INJECTION INTRAMUSCULAR; INTRAVENOUS; SUBCUTANEOUS EVERY 10 MIN PRN
Status: DISCONTINUED | OUTPATIENT
Start: 2022-01-01 | End: 2022-01-01 | Stop reason: HOSPADM

## 2022-01-01 RX ORDER — LIDOCAINE HYDROCHLORIDE 20 MG/ML
JELLY TOPICAL
Status: DISCONTINUED | OUTPATIENT
Start: 2022-01-01 | End: 2022-01-01

## 2022-01-01 RX ORDER — FLUCONAZOLE 100 MG/1
100 TABLET ORAL NIGHTLY
Qty: 7 TABLET | Refills: 0 | Status: SHIPPED | OUTPATIENT
Start: 2022-01-01 | End: 2022-01-01

## 2022-01-01 RX ORDER — EPHEDRINE SULFATE 50 MG/ML
INJECTION, SOLUTION INTRAVENOUS
Status: DISCONTINUED | OUTPATIENT
Start: 2022-01-01 | End: 2022-01-01

## 2022-01-01 RX ORDER — POTASSIUM CHLORIDE 14.9 MG/ML
20 INJECTION INTRAVENOUS
Status: DISCONTINUED | OUTPATIENT
Start: 2022-01-01 | End: 2022-01-01

## 2022-01-01 RX ORDER — ONDANSETRON 2 MG/ML
4 INJECTION INTRAMUSCULAR; INTRAVENOUS EVERY 8 HOURS PRN
Status: DISCONTINUED | OUTPATIENT
Start: 2022-01-01 | End: 2022-01-01

## 2022-01-01 RX ORDER — SODIUM CHLORIDE 0.9 % (FLUSH) 0.9 %
10 SYRINGE (ML) INJECTION EVERY 12 HOURS
Status: DISCONTINUED | OUTPATIENT
Start: 2022-01-01 | End: 2022-01-01

## 2022-01-01 RX ORDER — CHOLESTYRAMINE 4 G/4.8G
1 POWDER, FOR SUSPENSION ORAL 2 TIMES DAILY
Status: DISCONTINUED | OUTPATIENT
Start: 2022-01-01 | End: 2022-01-01 | Stop reason: HOSPADM

## 2022-01-01 RX ORDER — HYDROMORPHONE HYDROCHLORIDE 1 MG/ML
1 INJECTION, SOLUTION INTRAMUSCULAR; INTRAVENOUS; SUBCUTANEOUS
Status: DISCONTINUED | OUTPATIENT
Start: 2022-01-01 | End: 2022-01-01

## 2022-01-01 RX ORDER — GABAPENTIN 100 MG/1
100 CAPSULE ORAL 2 TIMES DAILY
COMMUNITY

## 2022-01-01 RX ORDER — HYDROMORPHONE HYDROCHLORIDE 1 MG/ML
0.2 INJECTION, SOLUTION INTRAMUSCULAR; INTRAVENOUS; SUBCUTANEOUS EVERY 5 MIN PRN
Status: DISCONTINUED | OUTPATIENT
Start: 2022-01-01 | End: 2022-01-01

## 2022-01-01 RX ORDER — ETOMIDATE 2 MG/ML
INJECTION INTRAVENOUS
Status: DISCONTINUED | OUTPATIENT
Start: 2022-01-01 | End: 2022-01-01

## 2022-01-01 RX ORDER — ASCORBIC ACID 500 MG
500 TABLET ORAL 2 TIMES DAILY
Status: DISCONTINUED | OUTPATIENT
Start: 2022-01-01 | End: 2022-01-01 | Stop reason: HOSPADM

## 2022-01-01 RX ADMIN — CYCLOBENZAPRINE 10 MG: 10 TABLET, FILM COATED ORAL at 08:04

## 2022-01-01 RX ADMIN — ALBUTEROL SULFATE 2 PUFF: 90 AEROSOL, METERED RESPIRATORY (INHALATION) at 07:06

## 2022-01-01 RX ADMIN — ROPINIROLE HYDROCHLORIDE 0.5 MG: 0.25 TABLET, FILM COATED ORAL at 09:06

## 2022-01-01 RX ADMIN — SERTRALINE HYDROCHLORIDE 50 MG: 50 TABLET, FILM COATED ORAL at 08:01

## 2022-01-01 RX ADMIN — OXYCODONE HYDROCHLORIDE AND ACETAMINOPHEN 1 TABLET: 10; 325 TABLET ORAL at 09:01

## 2022-01-01 RX ADMIN — FAMOTIDINE 20 MG: 10 INJECTION, SOLUTION INTRAVENOUS at 03:06

## 2022-01-01 RX ADMIN — DILTIAZEM HYDROCHLORIDE 120 MG: 120 CAPSULE, COATED, EXTENDED RELEASE ORAL at 09:01

## 2022-01-01 RX ADMIN — SIMETHICONE 80 MG: 80 TABLET, CHEWABLE ORAL at 04:09

## 2022-01-01 RX ADMIN — HYDROMORPHONE HYDROCHLORIDE 0.5 MG: 1 INJECTION, SOLUTION INTRAMUSCULAR; INTRAVENOUS; SUBCUTANEOUS at 11:11

## 2022-01-01 RX ADMIN — HYDROCODONE BITARTRATE AND ACETAMINOPHEN 1 TABLET: 5; 325 TABLET ORAL at 10:07

## 2022-01-01 RX ADMIN — MIDODRINE HYDROCHLORIDE 2.5 MG: 2.5 TABLET ORAL at 10:09

## 2022-01-01 RX ADMIN — HEPARIN SODIUM 5000 UNITS: 5000 INJECTION INTRAVENOUS; SUBCUTANEOUS at 02:06

## 2022-01-01 RX ADMIN — ENOXAPARIN SODIUM 30 MG: 30 INJECTION SUBCUTANEOUS at 04:01

## 2022-01-01 RX ADMIN — LEVOTHYROXINE SODIUM 88 MCG: 0.03 TABLET ORAL at 05:01

## 2022-01-01 RX ADMIN — MUPIROCIN: 20 OINTMENT TOPICAL at 09:06

## 2022-01-01 RX ADMIN — PROPOFOL 50 MG: 10 INJECTION, EMULSION INTRAVENOUS at 11:01

## 2022-01-01 RX ADMIN — DEXAMETHASONE SODIUM PHOSPHATE 4 MG: 4 INJECTION, SOLUTION INTRA-ARTICULAR; INTRALESIONAL; INTRAMUSCULAR; INTRAVENOUS; SOFT TISSUE at 09:08

## 2022-01-01 RX ADMIN — ALBUTEROL SULFATE 2 PUFF: 90 AEROSOL, METERED RESPIRATORY (INHALATION) at 01:06

## 2022-01-01 RX ADMIN — METRONIDAZOLE 500 MG: 5 INJECTION, SOLUTION INTRAVENOUS at 12:11

## 2022-01-01 RX ADMIN — CALCIUM GLUCONATE 1 G: 20 INJECTION, SOLUTION INTRAVENOUS at 04:11

## 2022-01-01 RX ADMIN — MUPIROCIN 1 G: 20 OINTMENT TOPICAL at 08:11

## 2022-01-01 RX ADMIN — SIMETHICONE 80 MG: 80 TABLET, CHEWABLE ORAL at 12:09

## 2022-01-01 RX ADMIN — MIDODRINE HYDROCHLORIDE 2.5 MG: 2.5 TABLET ORAL at 08:09

## 2022-01-01 RX ADMIN — HYDROCODONE BITARTRATE AND ACETAMINOPHEN 1 TABLET: 5; 325 TABLET ORAL at 03:01

## 2022-01-01 RX ADMIN — HYDROCODONE BITARTRATE AND ACETAMINOPHEN 1 TABLET: 5; 325 TABLET ORAL at 04:06

## 2022-01-01 RX ADMIN — MIDODRINE HYDROCHLORIDE 2.5 MG: 2.5 TABLET ORAL at 04:09

## 2022-01-01 RX ADMIN — OXYCODONE HYDROCHLORIDE AND ACETAMINOPHEN 1 TABLET: 10; 325 TABLET ORAL at 07:01

## 2022-01-01 RX ADMIN — HEPARIN SODIUM 5000 UNITS: 5000 INJECTION INTRAVENOUS; SUBCUTANEOUS at 10:06

## 2022-01-01 RX ADMIN — ACETAMINOPHEN 1000 MG: 500 TABLET ORAL at 02:11

## 2022-01-01 RX ADMIN — MIDODRINE HYDROCHLORIDE 2.5 MG: 2.5 TABLET ORAL at 03:09

## 2022-01-01 RX ADMIN — GABAPENTIN 100 MG: 100 CAPSULE ORAL at 08:04

## 2022-01-01 RX ADMIN — GABAPENTIN 100 MG: 100 CAPSULE ORAL at 01:04

## 2022-01-01 RX ADMIN — ATROPINE SULFATE 2 DROP: 10 SOLUTION/ DROPS OPHTHALMIC at 01:11

## 2022-01-01 RX ADMIN — POTASSIUM CHLORIDE 20 MEQ: 7.46 INJECTION, SOLUTION INTRAVENOUS at 08:09

## 2022-01-01 RX ADMIN — ALBUTEROL SULFATE 2 PUFF: 90 AEROSOL, METERED RESPIRATORY (INHALATION) at 08:06

## 2022-01-01 RX ADMIN — GABAPENTIN 100 MG: 100 CAPSULE ORAL at 10:06

## 2022-01-01 RX ADMIN — SEVELAMER CARBONATE 800 MG: 800 TABLET, FILM COATED ORAL at 12:06

## 2022-01-01 RX ADMIN — MUPIROCIN 1 G: 20 OINTMENT TOPICAL at 08:09

## 2022-01-01 RX ADMIN — CYANOCOBALAMIN 1000 MCG: 1000 INJECTION, SOLUTION INTRAMUSCULAR at 08:04

## 2022-01-01 RX ADMIN — FAMOTIDINE: 10 INJECTION INTRAVENOUS at 06:11

## 2022-01-01 RX ADMIN — GLYCOPYRROLATE 0.4 MG: 0.2 INJECTION INTRAMUSCULAR; INTRAVENOUS at 01:11

## 2022-01-01 RX ADMIN — DILTIAZEM HYDROCHLORIDE 120 MG: 120 CAPSULE, COATED, EXTENDED RELEASE ORAL at 08:04

## 2022-01-01 RX ADMIN — MORPHINE SULFATE 2 MG: 2 INJECTION, SOLUTION INTRAMUSCULAR; INTRAVENOUS at 08:01

## 2022-01-01 RX ADMIN — MORPHINE SULFATE 4 MG: 4 INJECTION, SOLUTION INTRAMUSCULAR; INTRAVENOUS at 09:09

## 2022-01-01 RX ADMIN — PROMETHAZINE HYDROCHLORIDE 6.25 MG: 25 INJECTION INTRAMUSCULAR; INTRAVENOUS at 09:01

## 2022-01-01 RX ADMIN — HYDROCODONE BITARTRATE AND ACETAMINOPHEN 1 TABLET: 5; 325 TABLET ORAL at 09:09

## 2022-01-01 RX ADMIN — ROPINIROLE HYDROCHLORIDE 0.5 MG: 0.25 TABLET, FILM COATED ORAL at 03:07

## 2022-01-01 RX ADMIN — LORAZEPAM 0.5 MG: 0.5 TABLET ORAL at 08:04

## 2022-01-01 RX ADMIN — MORPHINE SULFATE 2 MG: 2 INJECTION, SOLUTION INTRAMUSCULAR; INTRAVENOUS at 07:04

## 2022-01-01 RX ADMIN — MORPHINE SULFATE 4 MG: 4 INJECTION, SOLUTION INTRAMUSCULAR; INTRAVENOUS at 08:09

## 2022-01-01 RX ADMIN — SODIUM CHLORIDE: 9 INJECTION, SOLUTION INTRAVENOUS at 03:06

## 2022-01-01 RX ADMIN — FENTANYL CITRATE 25 MCG: 50 INJECTION, SOLUTION INTRAMUSCULAR; INTRAVENOUS at 11:08

## 2022-01-01 RX ADMIN — VANCOMYCIN HYDROCHLORIDE 1250 MG: 1.25 INJECTION, POWDER, LYOPHILIZED, FOR SOLUTION INTRAVENOUS at 09:01

## 2022-01-01 RX ADMIN — HYDRALAZINE HYDROCHLORIDE 50 MG: 25 TABLET, FILM COATED ORAL at 09:06

## 2022-01-01 RX ADMIN — SERTRALINE HYDROCHLORIDE 50 MG: 50 TABLET, FILM COATED ORAL at 09:01

## 2022-01-01 RX ADMIN — OXYCODONE HYDROCHLORIDE AND ACETAMINOPHEN 500 MG: 500 TABLET ORAL at 09:07

## 2022-01-01 RX ADMIN — POTASSIUM CHLORIDE 40 MEQ: 14.9 INJECTION, SOLUTION INTRAVENOUS at 04:11

## 2022-01-01 RX ADMIN — ROPINIROLE HYDROCHLORIDE 0.25 MG: 0.25 TABLET, FILM COATED ORAL at 01:04

## 2022-01-01 RX ADMIN — ENOXAPARIN SODIUM 30 MG: 30 INJECTION SUBCUTANEOUS at 05:01

## 2022-01-01 RX ADMIN — NOREPINEPHRINE BITARTRATE 0.35 MCG/KG/MIN: 8 INJECTION, SOLUTION INTRAVENOUS at 05:11

## 2022-01-01 RX ADMIN — MICONAZOLE NITRATE: 20 CREAM TOPICAL at 08:01

## 2022-01-01 RX ADMIN — ROPINIROLE HYDROCHLORIDE 0.25 MG: 0.25 TABLET, FILM COATED ORAL at 08:04

## 2022-01-01 RX ADMIN — MORPHINE SULFATE 4 MG: 4 INJECTION, SOLUTION INTRAMUSCULAR; INTRAVENOUS at 09:06

## 2022-01-01 RX ADMIN — EPOETIN ALFA-EPBX 7500 UNITS: 10000 INJECTION, SOLUTION INTRAVENOUS; SUBCUTANEOUS at 04:06

## 2022-01-01 RX ADMIN — DEXTROSE 250 MG: 50 INJECTION, SOLUTION INTRAVENOUS at 11:11

## 2022-01-01 RX ADMIN — LEVOTHYROXINE SODIUM 88 MCG: 88 TABLET ORAL at 05:04

## 2022-01-01 RX ADMIN — SIMETHICONE 80 MG: 80 TABLET, CHEWABLE ORAL at 10:09

## 2022-01-01 RX ADMIN — DILTIAZEM HYDROCHLORIDE 120 MG: 120 CAPSULE, COATED, EXTENDED RELEASE ORAL at 09:04

## 2022-01-01 RX ADMIN — VANCOMYCIN HYDROCHLORIDE 750 MG: 750 INJECTION, POWDER, LYOPHILIZED, FOR SOLUTION INTRAVENOUS at 03:11

## 2022-01-01 RX ADMIN — HYDROMORPHONE HYDROCHLORIDE 1 MG: 1 INJECTION, SOLUTION INTRAMUSCULAR; INTRAVENOUS; SUBCUTANEOUS at 08:09

## 2022-01-01 RX ADMIN — LEVOTHYROXINE SODIUM 88 MCG: 0.09 TABLET ORAL at 05:09

## 2022-01-01 RX ADMIN — Medication 100 MCG: at 11:11

## 2022-01-01 RX ADMIN — MORPHINE SULFATE 4 MG: 4 INJECTION, SOLUTION INTRAMUSCULAR; INTRAVENOUS at 10:06

## 2022-01-01 RX ADMIN — MORPHINE SULFATE 4 MG: 4 INJECTION, SOLUTION INTRAMUSCULAR; INTRAVENOUS at 09:11

## 2022-01-01 RX ADMIN — PROPOFOL 20 MG: 10 INJECTION, EMULSION INTRAVENOUS at 11:01

## 2022-01-01 RX ADMIN — HYDRALAZINE HYDROCHLORIDE 50 MG: 25 TABLET ORAL at 03:06

## 2022-01-01 RX ADMIN — HYDROCODONE BITARTRATE AND ACETAMINOPHEN 1 TABLET: 5; 325 TABLET ORAL at 07:04

## 2022-01-01 RX ADMIN — EPOETIN ALFA-EPBX 3300 UNITS: 10000 INJECTION, SOLUTION INTRAVENOUS; SUBCUTANEOUS at 01:06

## 2022-01-01 RX ADMIN — THERA TABS 1 TABLET: TAB at 10:06

## 2022-01-01 RX ADMIN — HYDROCODONE BITARTRATE AND ACETAMINOPHEN 1 TABLET: 5; 325 TABLET ORAL at 01:04

## 2022-01-01 RX ADMIN — HYDROCODONE BITARTRATE AND ACETAMINOPHEN 1 TABLET: 5; 325 TABLET ORAL at 04:04

## 2022-01-01 RX ADMIN — HYDROCODONE BITARTRATE AND ACETAMINOPHEN 1 TABLET: 5; 325 TABLET ORAL at 02:09

## 2022-01-01 RX ADMIN — OXYCODONE HYDROCHLORIDE AND ACETAMINOPHEN 1 TABLET: 10; 325 TABLET ORAL at 01:01

## 2022-01-01 RX ADMIN — FLUCONAZOLE 100 MG: 100 TABLET ORAL at 08:01

## 2022-01-01 RX ADMIN — MIDODRINE HYDROCHLORIDE 5 MG: 2.5 TABLET ORAL at 06:11

## 2022-01-01 RX ADMIN — MORPHINE SULFATE 2 MG: 2 INJECTION, SOLUTION INTRAMUSCULAR; INTRAVENOUS at 06:09

## 2022-01-01 RX ADMIN — LIDOCAINE HYDROCHLORIDE 80 MG: 20 INJECTION, SOLUTION EPIDURAL; INFILTRATION; INTRACAUDAL; PERINEURAL at 03:06

## 2022-01-01 RX ADMIN — BISACODYL 10 MG: 10 SUPPOSITORY RECTAL at 05:06

## 2022-01-01 RX ADMIN — HYDROCODONE BITARTRATE AND ACETAMINOPHEN 1 TABLET: 5; 325 TABLET ORAL at 12:09

## 2022-01-01 RX ADMIN — VANCOMYCIN HYDROCHLORIDE 1000 MG: 1 INJECTION, POWDER, LYOPHILIZED, FOR SOLUTION INTRAVENOUS at 11:01

## 2022-01-01 RX ADMIN — CYANOCOBALAMIN 1000 MCG: 1000 INJECTION, SOLUTION INTRAMUSCULAR at 10:04

## 2022-01-01 RX ADMIN — ONDANSETRON 4 MG: 2 INJECTION INTRAMUSCULAR; INTRAVENOUS at 11:01

## 2022-01-01 RX ADMIN — FENTANYL CITRATE 50 MCG: 50 INJECTION, SOLUTION INTRAMUSCULAR; INTRAVENOUS at 10:08

## 2022-01-01 RX ADMIN — MUPIROCIN: 20 OINTMENT TOPICAL at 08:01

## 2022-01-01 RX ADMIN — Medication 100 MCG: at 12:11

## 2022-01-01 RX ADMIN — MORPHINE SULFATE 2 MG: 2 INJECTION, SOLUTION INTRAMUSCULAR; INTRAVENOUS at 05:04

## 2022-01-01 RX ADMIN — LIDOCAINE HYDROCHLORIDE: 20 JELLY TOPICAL at 09:04

## 2022-01-01 RX ADMIN — MEROPENEM AND SODIUM CHLORIDE 500 MG: 500 INJECTION, SOLUTION INTRAVENOUS at 02:06

## 2022-01-01 RX ADMIN — HEPARIN SODIUM 5000 UNITS: 5000 INJECTION INTRAVENOUS; SUBCUTANEOUS at 09:06

## 2022-01-01 RX ADMIN — PHENYLEPHRINE HYDROCHLORIDE 200 MCG: 10 INJECTION INTRAVENOUS at 03:06

## 2022-01-01 RX ADMIN — HYDROCODONE BITARTRATE AND ACETAMINOPHEN 1 TABLET: 5; 325 TABLET ORAL at 05:09

## 2022-01-01 RX ADMIN — IPRATROPIUM BROMIDE AND ALBUTEROL SULFATE 3 ML: 2.5; .5 SOLUTION RESPIRATORY (INHALATION) at 03:06

## 2022-01-01 RX ADMIN — EPOETIN ALFA-EPBX 10000 UNITS: 10000 INJECTION, SOLUTION INTRAVENOUS; SUBCUTANEOUS at 03:09

## 2022-01-01 RX ADMIN — FENTANYL CITRATE 25 MCG: 50 INJECTION, SOLUTION INTRAMUSCULAR; INTRAVENOUS at 11:01

## 2022-01-01 RX ADMIN — LEVOTHYROXINE SODIUM 88 MCG: 0.09 TABLET ORAL at 05:11

## 2022-01-01 RX ADMIN — LORAZEPAM 0.5 MG: 0.5 TABLET ORAL at 10:06

## 2022-01-01 RX ADMIN — ROPINIROLE HYDROCHLORIDE 0.5 MG: 0.25 TABLET, FILM COATED ORAL at 05:06

## 2022-01-01 RX ADMIN — SEVELAMER CARBONATE 800 MG: 800 TABLET, FILM COATED ORAL at 05:06

## 2022-01-01 RX ADMIN — LEVOTHYROXINE SODIUM 88 MCG: 0.03 TABLET ORAL at 04:01

## 2022-01-01 RX ADMIN — MORPHINE SULFATE 2 MG: 2 INJECTION, SOLUTION INTRAMUSCULAR; INTRAVENOUS at 08:04

## 2022-01-01 RX ADMIN — MEROPENEM AND SODIUM CHLORIDE 500 MG: 500 INJECTION, SOLUTION INTRAVENOUS at 04:09

## 2022-01-01 RX ADMIN — SEVELAMER CARBONATE 800 MG: 800 TABLET, FILM COATED ORAL at 07:06

## 2022-01-01 RX ADMIN — MORPHINE SULFATE 2 MG: 2 INJECTION, SOLUTION INTRAMUSCULAR; INTRAVENOUS at 01:01

## 2022-01-01 RX ADMIN — DIPHENHYDRAMINE HYDROCHLORIDE 6.25 MG: 50 INJECTION INTRAMUSCULAR; INTRAVENOUS at 05:06

## 2022-01-01 RX ADMIN — MORPHINE SULFATE 2 MG: 2 INJECTION, SOLUTION INTRAMUSCULAR; INTRAVENOUS at 01:04

## 2022-01-01 RX ADMIN — MIDODRINE HYDROCHLORIDE 5 MG: 2.5 TABLET ORAL at 12:11

## 2022-01-01 RX ADMIN — ONDANSETRON HYDROCHLORIDE 4 MG: 2 SOLUTION INTRAMUSCULAR; INTRAVENOUS at 02:11

## 2022-01-01 RX ADMIN — NOREPINEPHRINE BITARTRATE 0.3 MCG/KG/MIN: 4 INJECTION, SOLUTION INTRAVENOUS at 08:11

## 2022-01-01 RX ADMIN — METOPROLOL TARTRATE 2.5 MG: 1 INJECTION, SOLUTION INTRAVENOUS at 11:11

## 2022-01-01 RX ADMIN — HYDROMORPHONE HYDROCHLORIDE 1 MG: 1 INJECTION, SOLUTION INTRAMUSCULAR; INTRAVENOUS; SUBCUTANEOUS at 03:09

## 2022-01-01 RX ADMIN — VANCOMYCIN HYDROCHLORIDE 1000 MG: 1 INJECTION, POWDER, LYOPHILIZED, FOR SOLUTION INTRAVENOUS at 04:04

## 2022-01-01 RX ADMIN — LORAZEPAM 2 MG: 2 INJECTION INTRAMUSCULAR; INTRAVENOUS at 07:11

## 2022-01-01 RX ADMIN — DIPHENHYDRAMINE HYDROCHLORIDE 25 MG: 25 CAPSULE ORAL at 02:01

## 2022-01-01 RX ADMIN — LIDOCAINE HYDROCHLORIDE 5 ML: 20 JELLY TOPICAL at 03:06

## 2022-01-01 RX ADMIN — POTASSIUM CHLORIDE 40 MEQ: 20 TABLET, EXTENDED RELEASE ORAL at 05:11

## 2022-01-01 RX ADMIN — DILTIAZEM HYDROCHLORIDE 120 MG: 120 CAPSULE, COATED, EXTENDED RELEASE ORAL at 01:04

## 2022-01-01 RX ADMIN — DEXTROSE 250 MG: 50 INJECTION, SOLUTION INTRAVENOUS at 05:11

## 2022-01-01 RX ADMIN — DILTIAZEM HYDROCHLORIDE 120 MG: 120 CAPSULE, COATED, EXTENDED RELEASE ORAL at 01:01

## 2022-01-01 RX ADMIN — ONDANSETRON 4 MG: 2 INJECTION INTRAMUSCULAR; INTRAVENOUS at 03:06

## 2022-01-01 RX ADMIN — ONDANSETRON 4 MG: 2 INJECTION, SOLUTION INTRAMUSCULAR; INTRAVENOUS at 10:08

## 2022-01-01 RX ADMIN — EPOETIN ALFA-EPBX 3300 UNITS: 10000 INJECTION, SOLUTION INTRAVENOUS; SUBCUTANEOUS at 06:04

## 2022-01-01 RX ADMIN — SODIUM CHLORIDE, SODIUM LACTATE, POTASSIUM CHLORIDE, AND CALCIUM CHLORIDE: .6; .31; .03; .02 INJECTION, SOLUTION INTRAVENOUS at 10:11

## 2022-01-01 RX ADMIN — HYDROMORPHONE HYDROCHLORIDE 0.5 MG: 1 INJECTION, SOLUTION INTRAMUSCULAR; INTRAVENOUS; SUBCUTANEOUS at 09:11

## 2022-01-01 RX ADMIN — MORPHINE SULFATE 2 MG: 2 INJECTION, SOLUTION INTRAMUSCULAR; INTRAVENOUS at 10:11

## 2022-01-01 RX ADMIN — DIPHENHYDRAMINE HYDROCHLORIDE 25 MG: 25 CAPSULE ORAL at 10:04

## 2022-01-01 RX ADMIN — ONDANSETRON 4 MG: 2 INJECTION INTRAMUSCULAR; INTRAVENOUS at 04:09

## 2022-01-01 RX ADMIN — MEROPENEM AND SODIUM CHLORIDE 500 MG: 500 INJECTION, SOLUTION INTRAVENOUS at 12:06

## 2022-01-01 RX ADMIN — NOREPINEPHRINE BITARTRATE 0.1 MCG/KG/MIN: 8 INJECTION, SOLUTION INTRAVENOUS at 06:11

## 2022-01-01 RX ADMIN — EPOETIN ALFA-EPBX 10000 UNITS: 10000 INJECTION, SOLUTION INTRAVENOUS; SUBCUTANEOUS at 06:09

## 2022-01-01 RX ADMIN — MUPIROCIN 1 G: 20 OINTMENT TOPICAL at 10:11

## 2022-01-01 RX ADMIN — LEVOTHYROXINE SODIUM ANHYDROUS 25 MCG: 100 INJECTION, POWDER, LYOPHILIZED, FOR SOLUTION INTRAVENOUS at 08:09

## 2022-01-01 RX ADMIN — ROPINIROLE HYDROCHLORIDE 0.25 MG: 0.25 TABLET, FILM COATED ORAL at 03:04

## 2022-01-01 RX ADMIN — OXYCODONE HYDROCHLORIDE 5 MG: 5 TABLET ORAL at 01:09

## 2022-01-01 RX ADMIN — SERTRALINE HYDROCHLORIDE 50 MG: 50 TABLET ORAL at 10:06

## 2022-01-01 RX ADMIN — WHITE PETROLATUM 41 % TOPICAL OINTMENT: OINTMENT at 09:01

## 2022-01-01 RX ADMIN — MIDODRINE HYDROCHLORIDE 2.5 MG: 2.5 TABLET ORAL at 09:09

## 2022-01-01 RX ADMIN — IODIXANOL 100 ML: 320 INJECTION, SOLUTION INTRAVASCULAR at 12:01

## 2022-01-01 RX ADMIN — METRONIDAZOLE 500 MG: 5 INJECTION, SOLUTION INTRAVENOUS at 09:11

## 2022-01-01 RX ADMIN — OXYCODONE HYDROCHLORIDE AND ACETAMINOPHEN 1 TABLET: 10; 325 TABLET ORAL at 04:01

## 2022-01-01 RX ADMIN — LORAZEPAM 1 MG: 2 INJECTION INTRAMUSCULAR; INTRAVENOUS at 08:11

## 2022-01-01 RX ADMIN — SERTRALINE HYDROCHLORIDE 50 MG: 50 TABLET ORAL at 08:04

## 2022-01-01 RX ADMIN — ONDANSETRON 4 MG: 2 INJECTION INTRAMUSCULAR; INTRAVENOUS at 05:04

## 2022-01-01 RX ADMIN — POTASSIUM CHLORIDE: 14.9 INJECTION, SOLUTION INTRAVENOUS at 11:11

## 2022-01-01 RX ADMIN — HYDROMORPHONE HYDROCHLORIDE 0.2 MG: 1 INJECTION, SOLUTION INTRAMUSCULAR; INTRAVENOUS; SUBCUTANEOUS at 12:01

## 2022-01-01 RX ADMIN — SIMETHICONE 80 MG: 80 TABLET, CHEWABLE ORAL at 06:09

## 2022-01-01 RX ADMIN — GABAPENTIN 100 MG: 100 CAPSULE ORAL at 10:04

## 2022-01-01 RX ADMIN — NYSTATIN AND TRIAMCINOLONE ACETONIDE: 100000; 1 CREAM TOPICAL at 08:04

## 2022-01-01 RX ADMIN — ENOXAPARIN SODIUM 30 MG: 30 INJECTION SUBCUTANEOUS at 04:04

## 2022-01-01 RX ADMIN — NOREPINEPHRINE BITARTRATE 0.15 MCG/KG/MIN: 8 INJECTION, SOLUTION INTRAVENOUS at 10:11

## 2022-01-01 RX ADMIN — MORPHINE SULFATE 4 MG: 4 INJECTION, SOLUTION INTRAMUSCULAR; INTRAVENOUS at 06:06

## 2022-01-01 RX ADMIN — IPRATROPIUM BROMIDE AND ALBUTEROL SULFATE 3 ML: 2.5; .5 SOLUTION RESPIRATORY (INHALATION) at 07:06

## 2022-01-01 RX ADMIN — GABAPENTIN 100 MG: 100 CAPSULE ORAL at 09:09

## 2022-01-01 RX ADMIN — CHLORHEXIDINE GLUCONATE 15 ML: 1.2 RINSE ORAL at 08:09

## 2022-01-01 RX ADMIN — LACTULOSE 20 G: 20 SOLUTION ORAL at 01:04

## 2022-01-01 RX ADMIN — MORPHINE SULFATE 4 MG: 4 INJECTION, SOLUTION INTRAMUSCULAR; INTRAVENOUS at 10:09

## 2022-01-01 RX ADMIN — EPOETIN ALFA-EPBX 4700 UNITS: 10000 INJECTION, SOLUTION INTRAVENOUS; SUBCUTANEOUS at 09:11

## 2022-01-01 RX ADMIN — DIPHENHYDRAMINE HYDROCHLORIDE 6.25 MG: 50 INJECTION INTRAMUSCULAR; INTRAVENOUS at 12:01

## 2022-01-01 RX ADMIN — CEFTRIAXONE 1 G: 1 INJECTION, POWDER, FOR SOLUTION INTRAMUSCULAR; INTRAVENOUS at 03:04

## 2022-01-01 RX ADMIN — OXYCODONE HYDROCHLORIDE AND ACETAMINOPHEN 500 MG: 500 TABLET ORAL at 09:06

## 2022-01-01 RX ADMIN — MIDODRINE HYDROCHLORIDE 5 MG: 2.5 TABLET ORAL at 03:11

## 2022-01-01 RX ADMIN — DEXTROSE 0.09 MCG/KG/MIN: 5 SOLUTION INTRAVENOUS at 12:11

## 2022-01-01 RX ADMIN — LORAZEPAM 0.5 MG: 0.5 TABLET ORAL at 09:06

## 2022-01-01 RX ADMIN — MORPHINE SULFATE 5 MG/HR: 10 INJECTION INTRAVENOUS at 11:11

## 2022-01-01 RX ADMIN — LIDOCAINE HYDROCHLORIDE 50 MG: 20 INJECTION, SOLUTION INTRAVENOUS at 10:11

## 2022-01-01 RX ADMIN — LEVOTHYROXINE SODIUM 88 MCG: 88 TABLET ORAL at 05:06

## 2022-01-01 RX ADMIN — MEROPENEM AND SODIUM CHLORIDE 1 G: 1 INJECTION, SOLUTION INTRAVENOUS at 02:01

## 2022-01-01 RX ADMIN — DOXYCYCLINE HYCLATE 100 MG: 100 CAPSULE ORAL at 08:09

## 2022-01-01 RX ADMIN — FENTANYL CITRATE 50 MCG: 50 INJECTION INTRAMUSCULAR; INTRAVENOUS at 11:01

## 2022-01-01 RX ADMIN — PROPOFOL 30 MG: 10 INJECTION, EMULSION INTRAVENOUS at 11:01

## 2022-01-01 RX ADMIN — HYDROCODONE BITARTRATE AND ACETAMINOPHEN 1 TABLET: 5; 325 TABLET ORAL at 05:04

## 2022-01-01 RX ADMIN — POTASSIUM CHLORIDE: 2 INJECTION, SOLUTION, CONCENTRATE INTRAVENOUS at 07:11

## 2022-01-01 RX ADMIN — ROPINIROLE HYDROCHLORIDE 0.25 MG: 0.25 TABLET, FILM COATED ORAL at 09:04

## 2022-01-01 RX ADMIN — LORAZEPAM 2 MG: 2 INJECTION INTRAMUSCULAR; INTRAVENOUS at 05:11

## 2022-01-01 RX ADMIN — ALBUTEROL SULFATE 2 PUFF: 90 AEROSOL, METERED RESPIRATORY (INHALATION) at 03:06

## 2022-01-01 RX ADMIN — LORAZEPAM 2 MG: 2 INJECTION INTRAMUSCULAR; INTRAVENOUS at 09:11

## 2022-01-01 RX ADMIN — MUPIROCIN 1 G: 20 OINTMENT TOPICAL at 10:09

## 2022-01-01 RX ADMIN — MEROPENEM AND SODIUM CHLORIDE 500 MG: 500 INJECTION, SOLUTION INTRAVENOUS at 02:09

## 2022-01-01 RX ADMIN — DILTIAZEM HYDROCHLORIDE 120 MG: 120 CAPSULE, COATED, EXTENDED RELEASE ORAL at 08:06

## 2022-01-01 RX ADMIN — SODIUM CHLORIDE, PRESERVATIVE FREE 10 ML: 5 INJECTION INTRAVENOUS at 08:09

## 2022-01-01 RX ADMIN — ONDANSETRON 4 MG: 2 INJECTION, SOLUTION INTRAMUSCULAR; INTRAVENOUS at 09:08

## 2022-01-01 RX ADMIN — SODIUM CHLORIDE, SODIUM LACTATE, POTASSIUM CHLORIDE, AND CALCIUM CHLORIDE: .6; .31; .03; .02 INJECTION, SOLUTION INTRAVENOUS at 03:09

## 2022-01-01 RX ADMIN — MUPIROCIN: 20 OINTMENT TOPICAL at 09:07

## 2022-01-01 RX ADMIN — DILTIAZEM HYDROCHLORIDE 120 MG: 120 CAPSULE, COATED, EXTENDED RELEASE ORAL at 08:01

## 2022-01-01 RX ADMIN — MORPHINE SULFATE 2 MG: 2 INJECTION, SOLUTION INTRAMUSCULAR; INTRAVENOUS at 12:01

## 2022-01-01 RX ADMIN — DILTIAZEM HYDROCHLORIDE 120 MG: 120 CAPSULE, COATED, EXTENDED RELEASE ORAL at 07:06

## 2022-01-01 RX ADMIN — NYSTATIN AND TRIAMCINOLONE ACETONIDE: 100000; 1 CREAM TOPICAL at 09:04

## 2022-01-01 RX ADMIN — HYDROCODONE BITARTRATE AND ACETAMINOPHEN 1 TABLET: 5; 325 TABLET ORAL at 07:09

## 2022-01-01 RX ADMIN — LIDOCAINE HYDROCHLORIDE,EPINEPHRINE BITARTRATE 10 ML: 10; .01 INJECTION, SOLUTION INFILTRATION; PERINEURAL at 11:08

## 2022-01-01 RX ADMIN — SODIUM CHLORIDE 250 ML: 0.9 INJECTION, SOLUTION INTRAVENOUS at 05:11

## 2022-01-01 RX ADMIN — EPOETIN ALFA-EPBX 10000 UNITS: 10000 INJECTION, SOLUTION INTRAVENOUS; SUBCUTANEOUS at 01:09

## 2022-01-01 RX ADMIN — NYSTATIN AND TRIAMCINOLONE ACETONIDE: 100000; 1 CREAM TOPICAL at 04:04

## 2022-01-01 RX ADMIN — SERTRALINE HYDROCHLORIDE 50 MG: 50 TABLET ORAL at 09:04

## 2022-01-01 RX ADMIN — DEXTROSE 250 MG: 50 INJECTION, SOLUTION INTRAVENOUS at 02:11

## 2022-01-01 RX ADMIN — OXYCODONE 5 MG: 5 TABLET ORAL at 11:08

## 2022-01-01 RX ADMIN — ONDANSETRON 4 MG: 2 INJECTION INTRAMUSCULAR; INTRAVENOUS at 12:09

## 2022-01-01 RX ADMIN — OXYCODONE HYDROCHLORIDE 5 MG: 5 TABLET ORAL at 04:06

## 2022-01-01 RX ADMIN — MORPHINE SULFATE 2 MG: 2 INJECTION, SOLUTION INTRAMUSCULAR; INTRAVENOUS at 09:04

## 2022-01-01 RX ADMIN — GABAPENTIN 100 MG: 100 CAPSULE ORAL at 09:04

## 2022-01-01 RX ADMIN — ROPINIROLE HYDROCHLORIDE 0.5 MG: 0.25 TABLET, FILM COATED ORAL at 03:06

## 2022-01-01 RX ADMIN — OXYCODONE HYDROCHLORIDE AND ACETAMINOPHEN 500 MG: 500 TABLET ORAL at 10:06

## 2022-01-01 RX ADMIN — DEXAMETHASONE SODIUM PHOSPHATE 4 MG: 4 INJECTION, SOLUTION INTRAMUSCULAR; INTRAVENOUS at 10:01

## 2022-01-01 RX ADMIN — HYDROCODONE BITARTRATE AND ACETAMINOPHEN 1 TABLET: 5; 325 TABLET ORAL at 11:06

## 2022-01-01 RX ADMIN — ROPINIROLE HYDROCHLORIDE 0.5 MG: 0.25 TABLET, FILM COATED ORAL at 10:06

## 2022-01-01 RX ADMIN — GABAPENTIN 100 MG: 100 CAPSULE ORAL at 08:09

## 2022-01-01 RX ADMIN — CEFTRIAXONE SODIUM 1 G: 1 INJECTION, POWDER, FOR SOLUTION INTRAMUSCULAR; INTRAVENOUS at 08:01

## 2022-01-01 RX ADMIN — HYDROCODONE BITARTRATE AND ACETAMINOPHEN 1 TABLET: 5; 325 TABLET ORAL at 01:07

## 2022-01-01 RX ADMIN — LIDOCAINE HYDROCHLORIDE: 20 JELLY TOPICAL at 05:04

## 2022-01-01 RX ADMIN — DIPHENHYDRAMINE HYDROCHLORIDE 25 MG: 25 CAPSULE ORAL at 08:01

## 2022-01-01 RX ADMIN — CEFTRIAXONE SODIUM 1 G: 1 INJECTION, POWDER, FOR SOLUTION INTRAMUSCULAR; INTRAVENOUS at 01:11

## 2022-01-01 RX ADMIN — LEVOTHYROXINE SODIUM 88 MCG: 0.09 TABLET ORAL at 04:06

## 2022-01-01 RX ADMIN — CEFTRIAXONE 1 G: 1 INJECTION, POWDER, FOR SOLUTION INTRAMUSCULAR; INTRAVENOUS at 04:04

## 2022-01-01 RX ADMIN — DEXTROSE 250 MG: 50 INJECTION, SOLUTION INTRAVENOUS at 04:11

## 2022-01-01 RX ADMIN — NYSTATIN AND TRIAMCINOLONE ACETONIDE: 100000; 1 CREAM TOPICAL at 01:04

## 2022-01-01 RX ADMIN — HYDROCODONE BITARTRATE AND ACETAMINOPHEN 1 TABLET: 5; 325 TABLET ORAL at 06:04

## 2022-01-01 RX ADMIN — MORPHINE SULFATE 2 MG: 2 INJECTION, SOLUTION INTRAMUSCULAR; INTRAVENOUS at 05:01

## 2022-01-01 RX ADMIN — ETOMIDATE 14 MG: 2 INJECTION INTRAVENOUS at 10:11

## 2022-01-01 RX ADMIN — MORPHINE SULFATE 2 MG: 2 INJECTION, SOLUTION INTRAMUSCULAR; INTRAVENOUS at 05:06

## 2022-01-01 RX ADMIN — LEVOTHYROXINE SODIUM 88 MCG: 0.09 TABLET ORAL at 05:06

## 2022-01-01 RX ADMIN — OXYCODONE HYDROCHLORIDE 5 MG: 5 TABLET ORAL at 05:09

## 2022-01-01 RX ADMIN — LORAZEPAM 2 MG: 2 INJECTION INTRAMUSCULAR; INTRAVENOUS at 02:11

## 2022-01-01 RX ADMIN — Medication 5 MG: at 10:11

## 2022-01-01 RX ADMIN — DILTIAZEM HYDROCHLORIDE 120 MG: 120 CAPSULE, COATED, EXTENDED RELEASE ORAL at 08:09

## 2022-01-01 RX ADMIN — ONDANSETRON 4 MG: 2 INJECTION INTRAMUSCULAR; INTRAVENOUS at 05:09

## 2022-01-01 RX ADMIN — MAGNESIUM SULFATE 1 G: 1 INJECTION INTRAVENOUS at 01:09

## 2022-01-01 RX ADMIN — SODIUM CHLORIDE, SODIUM LACTATE, POTASSIUM CHLORIDE, AND CALCIUM CHLORIDE 1028 ML: .6; .31; .03; .02 INJECTION, SOLUTION INTRAVENOUS at 12:09

## 2022-01-01 RX ADMIN — METRONIDAZOLE 500 MG: 5 INJECTION, SOLUTION INTRAVENOUS at 01:11

## 2022-01-01 RX ADMIN — ONDANSETRON 4 MG: 2 INJECTION INTRAMUSCULAR; INTRAVENOUS at 03:09

## 2022-01-01 RX ADMIN — MORPHINE SULFATE 4 MG: 4 INJECTION, SOLUTION INTRAMUSCULAR; INTRAVENOUS at 05:11

## 2022-01-01 RX ADMIN — NYSTATIN AND TRIAMCINOLONE ACETONIDE: 100000; 1 CREAM TOPICAL at 06:04

## 2022-01-01 RX ADMIN — SERTRALINE HYDROCHLORIDE 50 MG: 50 TABLET ORAL at 09:06

## 2022-01-01 RX ADMIN — MICONAZOLE NITRATE: 20 CREAM TOPICAL at 09:01

## 2022-01-01 RX ADMIN — MORPHINE SULFATE 4 MG: 4 INJECTION, SOLUTION INTRAMUSCULAR; INTRAVENOUS at 01:11

## 2022-01-01 RX ADMIN — CEFEPIME HYDROCHLORIDE 1 G: 1 INJECTION, SOLUTION INTRAVENOUS at 05:11

## 2022-01-01 RX ADMIN — METRONIDAZOLE 500 MG: 5 INJECTION, SOLUTION INTRAVENOUS at 04:11

## 2022-01-01 RX ADMIN — MORPHINE SULFATE 2 MG: 2 INJECTION, SOLUTION INTRAMUSCULAR; INTRAVENOUS at 04:01

## 2022-01-01 RX ADMIN — MORPHINE SULFATE 4 MG: 4 INJECTION, SOLUTION INTRAMUSCULAR; INTRAVENOUS at 03:06

## 2022-01-01 RX ADMIN — DEXAMETHASONE 6 MG: 4 TABLET ORAL at 09:06

## 2022-01-01 RX ADMIN — OXYCODONE HYDROCHLORIDE AND ACETAMINOPHEN 1 TABLET: 10; 325 TABLET ORAL at 08:01

## 2022-01-01 RX ADMIN — HYDROCODONE BITARTRATE AND ACETAMINOPHEN 1 TABLET: 5; 325 TABLET ORAL at 01:09

## 2022-01-01 RX ADMIN — DOXYCYCLINE HYCLATE 100 MG: 100 CAPSULE ORAL at 12:09

## 2022-01-01 RX ADMIN — LEVOTHYROXINE SODIUM 88 MCG: 0.09 TABLET ORAL at 08:09

## 2022-01-01 RX ADMIN — LEVOTHYROXINE SODIUM 88 MCG: 88 TABLET ORAL at 05:09

## 2022-01-01 RX ADMIN — CHLORHEXIDINE GLUCONATE 15 ML: 1.2 RINSE ORAL at 08:11

## 2022-01-01 RX ADMIN — NOREPINEPHRINE BITARTRATE 0.05 MCG/KG/MIN: 4 INJECTION, SOLUTION INTRAVENOUS at 02:11

## 2022-01-01 RX ADMIN — SERTRALINE HYDROCHLORIDE 50 MG: 50 TABLET ORAL at 08:09

## 2022-01-01 RX ADMIN — LORAZEPAM 1 MG: 2 INJECTION INTRAMUSCULAR; INTRAVENOUS at 09:11

## 2022-01-01 RX ADMIN — SEVELAMER CARBONATE 800 MG: 800 TABLET, FILM COATED ORAL at 03:06

## 2022-01-01 RX ADMIN — EPHEDRINE SULFATE 15 MG: 50 INJECTION INTRAVENOUS at 03:06

## 2022-01-01 RX ADMIN — OXYCODONE HYDROCHLORIDE 5 MG: 5 TABLET ORAL at 08:09

## 2022-01-01 RX ADMIN — PHENYLEPHRINE HYDROCHLORIDE 100 MCG: 10 INJECTION INTRAVENOUS at 04:09

## 2022-01-01 RX ADMIN — HYDROCODONE BITARTRATE AND ACETAMINOPHEN 1 TABLET: 5; 325 TABLET ORAL at 03:09

## 2022-01-01 RX ADMIN — DILTIAZEM HYDROCHLORIDE 120 MG: 120 CAPSULE, COATED, EXTENDED RELEASE ORAL at 10:09

## 2022-01-01 RX ADMIN — HYDROMORPHONE HYDROCHLORIDE 0.5 MG: 1 INJECTION, SOLUTION INTRAMUSCULAR; INTRAVENOUS; SUBCUTANEOUS at 05:11

## 2022-01-01 RX ADMIN — MIDODRINE HYDROCHLORIDE 5 MG: 2.5 TABLET ORAL at 08:11

## 2022-01-01 RX ADMIN — CEFTRIAXONE SODIUM 1 G: 1 INJECTION, POWDER, FOR SOLUTION INTRAMUSCULAR; INTRAVENOUS at 10:01

## 2022-01-01 RX ADMIN — WHITE PETROLATUM 41 % TOPICAL OINTMENT: OINTMENT at 08:01

## 2022-01-01 RX ADMIN — ROPINIROLE HYDROCHLORIDE 0.25 MG: 0.25 TABLET, FILM COATED ORAL at 04:04

## 2022-01-01 RX ADMIN — MORPHINE SULFATE 4 MG: 4 INJECTION, SOLUTION INTRAMUSCULAR; INTRAVENOUS at 01:06

## 2022-01-01 RX ADMIN — ONDANSETRON 4 MG: 2 INJECTION INTRAMUSCULAR; INTRAVENOUS at 01:04

## 2022-01-01 RX ADMIN — OXYCODONE HYDROCHLORIDE AND ACETAMINOPHEN 1 TABLET: 10; 325 TABLET ORAL at 02:01

## 2022-01-01 RX ADMIN — FENTANYL CITRATE 100 MCG: 50 INJECTION INTRAMUSCULAR; INTRAVENOUS at 06:09

## 2022-01-01 RX ADMIN — MORPHINE SULFATE 4 MG: 4 INJECTION, SOLUTION INTRAMUSCULAR; INTRAVENOUS at 05:09

## 2022-01-01 RX ADMIN — MORPHINE SULFATE 2 MG: 2 INJECTION, SOLUTION INTRAMUSCULAR; INTRAVENOUS at 03:04

## 2022-01-01 RX ADMIN — DEXTROSE 250 MG: 50 INJECTION, SOLUTION INTRAVENOUS at 09:11

## 2022-01-01 RX ADMIN — MORPHINE SULFATE 4 MG: 4 INJECTION, SOLUTION INTRAMUSCULAR; INTRAVENOUS at 04:06

## 2022-01-01 RX ADMIN — ONDANSETRON 4 MG: 2 INJECTION INTRAMUSCULAR; INTRAVENOUS at 06:06

## 2022-01-01 RX ADMIN — FENTANYL CITRATE 25 MCG: 50 INJECTION, SOLUTION INTRAMUSCULAR; INTRAVENOUS at 12:01

## 2022-01-01 RX ADMIN — SCOPALAMINE 1 PATCH: 1 PATCH, EXTENDED RELEASE TRANSDERMAL at 09:08

## 2022-01-01 RX ADMIN — LEVOFLOXACIN 750 MG: 750 INJECTION, SOLUTION INTRAVENOUS at 08:04

## 2022-01-01 RX ADMIN — ACETAMINOPHEN 1000 MG: 10 INJECTION, SOLUTION INTRAVENOUS at 10:01

## 2022-01-01 RX ADMIN — OXYCODONE HYDROCHLORIDE 5 MG: 5 TABLET ORAL at 10:09

## 2022-01-01 RX ADMIN — MORPHINE SULFATE 4 MG: 4 INJECTION, SOLUTION INTRAMUSCULAR; INTRAVENOUS at 07:11

## 2022-01-01 RX ADMIN — PROMETHAZINE HYDROCHLORIDE 6.25 MG: 25 INJECTION INTRAMUSCULAR; INTRAVENOUS at 10:09

## 2022-01-01 RX ADMIN — SIMETHICONE 80 MG: 80 TABLET, CHEWABLE ORAL at 01:09

## 2022-01-01 RX ADMIN — DILTIAZEM HYDROCHLORIDE 120 MG: 120 CAPSULE, COATED, EXTENDED RELEASE ORAL at 10:06

## 2022-01-01 RX ADMIN — CHLORHEXIDINE GLUCONATE 15 ML: 1.2 RINSE ORAL at 09:11

## 2022-01-01 RX ADMIN — LORAZEPAM 0.5 MG: 0.5 TABLET ORAL at 09:04

## 2022-01-01 RX ADMIN — HYDRALAZINE HYDROCHLORIDE 50 MG: 25 TABLET, FILM COATED ORAL at 05:07

## 2022-01-01 RX ADMIN — MORPHINE SULFATE 2 MG: 2 INJECTION, SOLUTION INTRAMUSCULAR; INTRAVENOUS at 10:04

## 2022-01-01 RX ADMIN — MEROPENEM AND SODIUM CHLORIDE 500 MG: 500 INJECTION, SOLUTION INTRAVENOUS at 01:06

## 2022-01-01 RX ADMIN — OXYCODONE HYDROCHLORIDE AND ACETAMINOPHEN 1 TABLET: 10; 325 TABLET ORAL at 11:01

## 2022-01-01 RX ADMIN — CHLORHEXIDINE GLUCONATE 15 ML: 1.2 RINSE ORAL at 10:11

## 2022-01-01 RX ADMIN — LEVOTHYROXINE SODIUM ANHYDROUS 25 MCG: 100 INJECTION, POWDER, LYOPHILIZED, FOR SOLUTION INTRAVENOUS at 10:09

## 2022-01-01 RX ADMIN — FENTANYL CITRATE 50 MCG: 50 INJECTION INTRAMUSCULAR; INTRAVENOUS at 10:01

## 2022-01-01 RX ADMIN — THERA TABS 1 TABLET: TAB at 09:07

## 2022-01-01 RX ADMIN — DILTIAZEM HYDROCHLORIDE 120 MG: 120 CAPSULE, COATED, EXTENDED RELEASE ORAL at 09:09

## 2022-01-01 RX ADMIN — CHLORHEXIDINE GLUCONATE 15 ML: 1.2 RINSE ORAL at 09:09

## 2022-01-01 RX ADMIN — LORAZEPAM 2 MG: 2 INJECTION INTRAMUSCULAR; INTRAVENOUS at 10:11

## 2022-01-01 RX ADMIN — NOREPINEPHRINE BITARTRATE 0.4 MCG/KG/MIN: 8 INJECTION, SOLUTION INTRAVENOUS at 02:11

## 2022-01-01 RX ADMIN — HYDROXYZINE HYDROCHLORIDE 25 MG: 25 TABLET ORAL at 01:09

## 2022-01-01 RX ADMIN — EPOETIN ALFA-EPBX 7500 UNITS: 10000 INJECTION, SOLUTION INTRAVENOUS; SUBCUTANEOUS at 01:07

## 2022-01-01 RX ADMIN — SODIUM CHLORIDE: 0.9 INJECTION, SOLUTION INTRAVENOUS at 02:11

## 2022-01-01 RX ADMIN — HYDRALAZINE HYDROCHLORIDE 50 MG: 25 TABLET ORAL at 09:06

## 2022-01-01 RX ADMIN — MORPHINE SULFATE 2 MG: 2 INJECTION, SOLUTION INTRAMUSCULAR; INTRAVENOUS at 03:06

## 2022-01-01 RX ADMIN — MORPHINE SULFATE 4 MG: 4 INJECTION, SOLUTION INTRAMUSCULAR; INTRAVENOUS at 06:11

## 2022-01-01 RX ADMIN — DILTIAZEM HYDROCHLORIDE 120 MG: 120 CAPSULE, COATED, EXTENDED RELEASE ORAL at 03:06

## 2022-01-01 RX ADMIN — MORPHINE SULFATE 4 MG: 4 INJECTION, SOLUTION INTRAMUSCULAR; INTRAVENOUS at 08:11

## 2022-01-01 RX ADMIN — MUPIROCIN: 20 OINTMENT TOPICAL at 08:06

## 2022-01-01 RX ADMIN — CYANOCOBALAMIN 1000 MCG: 1000 INJECTION, SOLUTION INTRAMUSCULAR at 01:04

## 2022-01-01 RX ADMIN — SIMETHICONE 80 MG: 80 TABLET, CHEWABLE ORAL at 08:09

## 2022-01-01 RX ADMIN — MORPHINE SULFATE 4 MG: 4 INJECTION, SOLUTION INTRAMUSCULAR; INTRAVENOUS at 11:09

## 2022-01-01 RX ADMIN — SODIUM CHLORIDE, SODIUM LACTATE, POTASSIUM CHLORIDE, AND CALCIUM CHLORIDE: .6; .31; .03; .02 INJECTION, SOLUTION INTRAVENOUS at 05:09

## 2022-01-01 RX ADMIN — DIPHENHYDRAMINE HYDROCHLORIDE 25 MG: 25 CAPSULE ORAL at 01:01

## 2022-01-01 RX ADMIN — LIDOCAINE HYDROCHLORIDE 80 MG: 20 INJECTION, SOLUTION INTRAVENOUS at 04:09

## 2022-01-01 RX ADMIN — GABAPENTIN 100 MG: 100 CAPSULE ORAL at 08:01

## 2022-01-01 RX ADMIN — MIDODRINE HYDROCHLORIDE 5 MG: 2.5 TABLET ORAL at 09:11

## 2022-01-01 RX ADMIN — MORPHINE SULFATE 2 MG: 2 INJECTION, SOLUTION INTRAMUSCULAR; INTRAVENOUS at 04:11

## 2022-01-01 RX ADMIN — PROMETHAZINE HYDROCHLORIDE 6.25 MG: 25 INJECTION INTRAMUSCULAR; INTRAVENOUS at 07:09

## 2022-01-01 RX ADMIN — LEVOTHYROXINE SODIUM 88 MCG: 88 TABLET ORAL at 06:04

## 2022-01-01 RX ADMIN — DIPHENHYDRAMINE HYDROCHLORIDE 25 MG: 25 CAPSULE ORAL at 04:01

## 2022-01-01 RX ADMIN — NYSTATIN AND TRIAMCINOLONE ACETONIDE: 100000; 1 CREAM TOPICAL at 03:04

## 2022-01-01 RX ADMIN — FLUCONAZOLE 100 MG: 100 TABLET ORAL at 09:01

## 2022-01-01 RX ADMIN — VANCOMYCIN HYDROCHLORIDE 1250 MG: 1.25 INJECTION, POWDER, LYOPHILIZED, FOR SOLUTION INTRAVENOUS at 08:01

## 2022-01-01 RX ADMIN — VANCOMYCIN HYDROCHLORIDE 1000 MG: 1 INJECTION, POWDER, LYOPHILIZED, FOR SOLUTION INTRAVENOUS at 03:01

## 2022-01-01 RX ADMIN — HYDROXYZINE HYDROCHLORIDE 25 MG: 25 TABLET ORAL at 03:09

## 2022-01-01 RX ADMIN — POTASSIUM CHLORIDE: 2 INJECTION, SOLUTION, CONCENTRATE INTRAVENOUS at 06:11

## 2022-01-01 RX ADMIN — ACETAMINOPHEN 650 MG: 650 SUPPOSITORY RECTAL at 10:11

## 2022-01-01 RX ADMIN — HYDROCODONE BITARTRATE AND ACETAMINOPHEN 1 TABLET: 5; 325 TABLET ORAL at 06:09

## 2022-01-01 RX ADMIN — EPOETIN ALFA-EPBX 3300 UNITS: 10000 INJECTION, SOLUTION INTRAVENOUS; SUBCUTANEOUS at 10:04

## 2022-01-01 RX ADMIN — NOREPINEPHRINE BITARTRATE 0.5 MCG/KG/MIN: 8 INJECTION, SOLUTION INTRAVENOUS at 02:11

## 2022-01-01 RX ADMIN — DIPHENHYDRAMINE HYDROCHLORIDE 25 MG: 50 INJECTION, SOLUTION INTRAMUSCULAR; INTRAVENOUS at 01:09

## 2022-01-01 RX ADMIN — HYDRALAZINE HYDROCHLORIDE 25 MG: 25 TABLET ORAL at 05:04

## 2022-01-01 RX ADMIN — DEXAMETHASONE 6 MG: 4 TABLET ORAL at 09:07

## 2022-01-01 RX ADMIN — LEVOTHYROXINE SODIUM ANHYDROUS 50 MCG: 100 INJECTION, POWDER, LYOPHILIZED, FOR SOLUTION INTRAVENOUS at 03:11

## 2022-01-01 RX ADMIN — EPOETIN ALFA-EPBX 4700 UNITS: 10000 INJECTION, SOLUTION INTRAVENOUS; SUBCUTANEOUS at 01:11

## 2022-01-01 RX ADMIN — GABAPENTIN 100 MG: 100 CAPSULE ORAL at 09:06

## 2022-01-01 RX ADMIN — HYDRALAZINE HYDROCHLORIDE 25 MG: 25 TABLET ORAL at 09:04

## 2022-01-01 RX ADMIN — CEFTRIAXONE SODIUM 1 G: 1 INJECTION, POWDER, FOR SOLUTION INTRAMUSCULAR; INTRAVENOUS at 07:01

## 2022-01-01 RX ADMIN — MEROPENEM AND SODIUM CHLORIDE 500 MG: 500 INJECTION, SOLUTION INTRAVENOUS at 04:01

## 2022-01-01 RX ADMIN — MORPHINE SULFATE 2 MG: 2 INJECTION, SOLUTION INTRAMUSCULAR; INTRAVENOUS at 09:06

## 2022-01-01 RX ADMIN — CHLORHEXIDINE GLUCONATE 15 ML: 1.2 RINSE ORAL at 10:09

## 2022-01-01 RX ADMIN — FLUCONAZOLE 150 MG: 50 TABLET ORAL at 07:04

## 2022-01-01 RX ADMIN — SERTRALINE HYDROCHLORIDE 50 MG: 50 TABLET ORAL at 09:09

## 2022-01-01 RX ADMIN — HYDRALAZINE HYDROCHLORIDE 50 MG: 25 TABLET ORAL at 05:06

## 2022-01-01 RX ADMIN — POTASSIUM CHLORIDE 20 MEQ: 1500 TABLET, EXTENDED RELEASE ORAL at 11:09

## 2022-01-01 RX ADMIN — FENTANYL CITRATE 50 MCG: 50 INJECTION INTRAMUSCULAR; INTRAVENOUS at 03:09

## 2022-01-01 RX ADMIN — HYDROCODONE BITARTRATE AND ACETAMINOPHEN 1 TABLET: 5; 325 TABLET ORAL at 04:09

## 2022-01-01 RX ADMIN — FENTANYL CITRATE 50 MCG: 50 INJECTION INTRAMUSCULAR; INTRAVENOUS at 03:06

## 2022-01-01 RX ADMIN — NOREPINEPHRINE BITARTRATE 0.5 MCG/KG/MIN: 8 INJECTION, SOLUTION INTRAVENOUS at 09:11

## 2022-01-01 RX ADMIN — ACETAMINOPHEN 1000 MG: 10 INJECTION, SOLUTION INTRAVENOUS at 09:08

## 2022-01-01 RX ADMIN — CEFTRIAXONE SODIUM 1 G: 1 INJECTION, POWDER, FOR SOLUTION INTRAMUSCULAR; INTRAVENOUS at 02:01

## 2022-01-01 RX ADMIN — DIPHENHYDRAMINE HYDROCHLORIDE 25 MG: 25 CAPSULE ORAL at 11:04

## 2022-01-01 RX ADMIN — MUPIROCIN 1 G: 20 OINTMENT TOPICAL at 09:11

## 2022-01-01 RX ADMIN — SODIUM CHLORIDE: 0.9 INJECTION, SOLUTION INTRAVENOUS at 04:01

## 2022-01-01 RX ADMIN — EPOETIN ALFA-EPBX 3300 UNITS: 10000 INJECTION, SOLUTION INTRAVENOUS; SUBCUTANEOUS at 05:04

## 2022-01-01 RX ADMIN — PROMETHAZINE HYDROCHLORIDE 6.25 MG: 25 INJECTION INTRAMUSCULAR; INTRAVENOUS at 04:09

## 2022-01-01 RX ADMIN — SODIUM CHLORIDE 500 ML: 0.9 INJECTION, SOLUTION INTRAVENOUS at 02:11

## 2022-01-01 RX ADMIN — SEVELAMER CARBONATE 800 MG: 800 TABLET, FILM COATED ORAL at 08:06

## 2022-01-01 RX ADMIN — OXYCODONE HYDROCHLORIDE 5 MG: 5 TABLET ORAL at 11:01

## 2022-01-01 RX ADMIN — ROPINIROLE HYDROCHLORIDE 0.5 MG: 0.25 TABLET, FILM COATED ORAL at 05:07

## 2022-01-01 RX ADMIN — LIDOCAINE AND PRILOCAINE: 25; 25 CREAM TOPICAL at 12:01

## 2022-01-01 RX ADMIN — EPOETIN ALFA-EPBX 10000 UNITS: 10000 INJECTION, SOLUTION INTRAVENOUS; SUBCUTANEOUS at 07:09

## 2022-01-01 RX ADMIN — EPOETIN ALFA-EPBX 10000 UNITS: 10000 INJECTION, SOLUTION INTRAVENOUS; SUBCUTANEOUS at 12:09

## 2022-01-01 RX ADMIN — GABAPENTIN 100 MG: 100 CAPSULE ORAL at 09:01

## 2022-01-01 RX ADMIN — ENOXAPARIN SODIUM 30 MG: 30 INJECTION SUBCUTANEOUS at 05:04

## 2022-01-01 RX ADMIN — HYDROMORPHONE HYDROCHLORIDE 1 MG: 1 INJECTION, SOLUTION INTRAMUSCULAR; INTRAVENOUS; SUBCUTANEOUS at 06:09

## 2022-01-01 RX ADMIN — MEROPENEM AND SODIUM CHLORIDE 500 MG: 500 INJECTION, SOLUTION INTRAVENOUS at 03:06

## 2022-01-01 RX ADMIN — MORPHINE SULFATE 2 MG: 2 INJECTION, SOLUTION INTRAMUSCULAR; INTRAVENOUS at 12:04

## 2022-01-01 RX ADMIN — DILTIAZEM HYDROCHLORIDE 120 MG: 120 CAPSULE, COATED, EXTENDED RELEASE ORAL at 02:01

## 2022-01-01 RX ADMIN — LORAZEPAM 0.5 MG: 0.5 TABLET ORAL at 10:04

## 2022-01-01 RX ADMIN — OXYCODONE HYDROCHLORIDE AND ACETAMINOPHEN 1 TABLET: 10; 325 TABLET ORAL at 05:01

## 2022-01-01 RX ADMIN — LEVOTHYROXINE SODIUM 88 MCG: 0.03 TABLET ORAL at 06:01

## 2022-01-01 RX ADMIN — DIPHENHYDRAMINE HYDROCHLORIDE 12.5 MG: 50 INJECTION, SOLUTION INTRAMUSCULAR; INTRAVENOUS at 11:08

## 2022-01-01 RX ADMIN — HYDROCODONE BITARTRATE AND ACETAMINOPHEN 1 TABLET: 5; 325 TABLET ORAL at 08:09

## 2022-01-01 RX ADMIN — ONDANSETRON 4 MG: 2 INJECTION INTRAMUSCULAR; INTRAVENOUS at 10:01

## 2022-01-01 RX ADMIN — ROCURONIUM BROMIDE 50 MG: 10 INJECTION, SOLUTION INTRAVENOUS at 04:09

## 2022-01-01 RX ADMIN — MIDODRINE HYDROCHLORIDE 5 MG: 2.5 TABLET ORAL at 02:11

## 2022-01-01 RX ADMIN — MAGNESIUM SULFATE HEPTAHYDRATE 2 G: 40 INJECTION, SOLUTION INTRAVENOUS at 11:11

## 2022-01-01 RX ADMIN — CEFTRIAXONE SODIUM 1 G: 1 INJECTION, POWDER, FOR SOLUTION INTRAMUSCULAR; INTRAVENOUS at 09:01

## 2022-01-01 RX ADMIN — MUPIROCIN 1 G: 20 OINTMENT TOPICAL at 09:09

## 2022-01-01 RX ADMIN — NYSTATIN AND TRIAMCINOLONE ACETONIDE: 100000; 1 CREAM TOPICAL at 05:04

## 2022-01-01 RX ADMIN — MORPHINE SULFATE 2 MG: 2 INJECTION, SOLUTION INTRAMUSCULAR; INTRAVENOUS at 09:01

## 2022-01-01 RX ADMIN — NOREPINEPHRINE BITARTRATE 0.7 MCG/KG/MIN: 8 INJECTION, SOLUTION INTRAVENOUS at 04:11

## 2022-01-01 RX ADMIN — MUPIROCIN: 20 OINTMENT TOPICAL at 10:06

## 2022-01-01 RX ADMIN — LORAZEPAM 1 MG: 2 INJECTION INTRAMUSCULAR; INTRAVENOUS at 02:11

## 2022-01-01 RX ADMIN — ROPINIROLE HYDROCHLORIDE 0.25 MG: 0.25 TABLET, FILM COATED ORAL at 05:04

## 2022-01-01 RX ADMIN — OXYCODONE HYDROCHLORIDE 5 MG: 5 TABLET ORAL at 11:09

## 2022-01-01 RX ADMIN — POTASSIUM CHLORIDE 10 MEQ: 7.46 INJECTION, SOLUTION INTRAVENOUS at 12:09

## 2022-01-01 RX ADMIN — NOREPINEPHRINE BITARTRATE 0.4 MCG/KG/MIN: 8 INJECTION, SOLUTION INTRAVENOUS at 08:11

## 2022-01-01 RX ADMIN — CHOLESTYRAMINE 4 G: 4 POWDER, FOR SUSPENSION ORAL at 09:01

## 2022-01-01 RX ADMIN — HEPARIN SODIUM 5000 UNITS: 5000 INJECTION INTRAVENOUS; SUBCUTANEOUS at 03:06

## 2022-01-01 RX ADMIN — HYDROMORPHONE HYDROCHLORIDE 1 MG: 1 INJECTION, SOLUTION INTRAMUSCULAR; INTRAVENOUS; SUBCUTANEOUS at 10:06

## 2022-01-01 RX ADMIN — DEXTROSE 250 MG: 50 INJECTION, SOLUTION INTRAVENOUS at 10:11

## 2022-01-01 RX ADMIN — SEVELAMER CARBONATE 800 MG: 800 TABLET, FILM COATED ORAL at 11:07

## 2022-01-01 RX ADMIN — FOLIC ACID 5 MG: 5 INJECTION, SOLUTION INTRAMUSCULAR; INTRAVENOUS; SUBCUTANEOUS at 10:11

## 2022-01-01 RX ADMIN — PROCHLORPERAZINE EDISYLATE 5 MG: 5 INJECTION INTRAMUSCULAR; INTRAVENOUS at 11:09

## 2022-01-01 RX ADMIN — PROPOFOL 100 MG: 10 INJECTION, EMULSION INTRAVENOUS at 09:08

## 2022-01-01 RX ADMIN — DOXYCYCLINE HYCLATE 100 MG: 100 CAPSULE ORAL at 10:09

## 2022-01-01 RX ADMIN — BENZONATATE 100 MG: 100 CAPSULE ORAL at 12:06

## 2022-01-01 RX ADMIN — LEVOTHYROXINE SODIUM 88 MCG: 0.09 TABLET ORAL at 05:07

## 2022-01-01 RX ADMIN — GABAPENTIN 100 MG: 100 CAPSULE ORAL at 07:06

## 2022-01-01 RX ADMIN — HYDROCODONE BITARTRATE AND ACETAMINOPHEN 1 TABLET: 5; 325 TABLET ORAL at 08:06

## 2022-01-01 RX ADMIN — LIDOCAINE HYDROCHLORIDE 50 MG: 20 INJECTION, SOLUTION INTRAVENOUS at 10:01

## 2022-01-01 RX ADMIN — EPOETIN ALFA-EPBX 3500 UNITS: 10000 INJECTION, SOLUTION INTRAVENOUS; SUBCUTANEOUS at 09:09

## 2022-01-01 RX ADMIN — HYDROMORPHONE HYDROCHLORIDE 1 MG: 1 INJECTION, SOLUTION INTRAMUSCULAR; INTRAVENOUS; SUBCUTANEOUS at 11:09

## 2022-01-01 RX ADMIN — MUPIROCIN 1 G: 20 OINTMENT TOPICAL at 02:11

## 2022-01-01 RX ADMIN — MORPHINE SULFATE 2 MG: 2 INJECTION, SOLUTION INTRAMUSCULAR; INTRAVENOUS at 01:11

## 2022-01-01 RX ADMIN — FAMOTIDINE 20 MG: 10 INJECTION INTRAVENOUS at 09:11

## 2022-01-01 RX ADMIN — IPRATROPIUM BROMIDE AND ALBUTEROL SULFATE 3 ML: 2.5; .5 SOLUTION RESPIRATORY (INHALATION) at 11:07

## 2022-01-01 RX ADMIN — DIPHENHYDRAMINE HYDROCHLORIDE 25 MG: 25 CAPSULE ORAL at 05:01

## 2022-01-01 RX ADMIN — OXYCODONE HYDROCHLORIDE 10 MG: 5 TABLET ORAL at 08:09

## 2022-01-01 RX ADMIN — MORPHINE SULFATE 2 MG: 2 INJECTION, SOLUTION INTRAMUSCULAR; INTRAVENOUS at 08:11

## 2022-01-01 RX ADMIN — ACETAMINOPHEN 650 MG: 325 TABLET ORAL at 08:09

## 2022-01-01 RX ADMIN — CHOLESTYRAMINE 4 G: 4 POWDER, FOR SUSPENSION ORAL at 08:01

## 2022-01-01 RX ADMIN — SUCCINYLCHOLINE CHLORIDE 20 MG/ML INJECTION SOLUTION 120 MG: SOLUTION at 10:11

## 2022-01-01 RX ADMIN — EPOETIN ALFA-EPBX 3500 UNITS: 10000 INJECTION, SOLUTION INTRAVENOUS; SUBCUTANEOUS at 12:09

## 2022-01-01 RX ADMIN — CEFOXITIN SODIUM 2 G: 2 INJECTION, SOLUTION INTRAVENOUS at 11:09

## 2022-01-01 RX ADMIN — HEPARIN SODIUM 5000 UNITS: 5000 INJECTION INTRAVENOUS; SUBCUTANEOUS at 05:06

## 2022-01-01 RX ADMIN — SODIUM CHLORIDE 1000 ML: 0.9 INJECTION, SOLUTION INTRAVENOUS at 10:09

## 2022-01-01 RX ADMIN — HYDROCODONE BITARTRATE AND ACETAMINOPHEN 1 TABLET: 5; 325 TABLET ORAL at 09:06

## 2022-01-01 RX ADMIN — ACETAMINOPHEN 650 MG: 325 TABLET ORAL at 10:09

## 2022-01-01 RX ADMIN — OXYCODONE AND ACETAMINOPHEN 1 TABLET: 325; 5 TABLET ORAL at 12:10

## 2022-01-01 RX ADMIN — MUPIROCIN: 20 OINTMENT TOPICAL at 09:01

## 2022-01-01 RX ADMIN — FAMOTIDINE 20 MG: 10 INJECTION, SOLUTION INTRAVENOUS at 03:09

## 2022-01-01 RX ADMIN — MAGNESIUM SULFATE 1 G: 1 INJECTION INTRAVENOUS at 07:01

## 2022-01-01 RX ADMIN — POTASSIUM CHLORIDE 10 MEQ: 7.46 INJECTION, SOLUTION INTRAVENOUS at 04:11

## 2022-01-01 RX ADMIN — LORAZEPAM 1 MG: 2 INJECTION INTRAMUSCULAR; INTRAVENOUS at 01:11

## 2022-01-01 RX ADMIN — ACETAMINOPHEN 500 MG: 500 TABLET ORAL at 11:08

## 2022-01-01 RX ADMIN — MORPHINE SULFATE 4 MG: 4 INJECTION, SOLUTION INTRAMUSCULAR; INTRAVENOUS at 10:04

## 2022-01-01 RX ADMIN — FENTANYL CITRATE 50 MCG: 50 INJECTION, SOLUTION INTRAMUSCULAR; INTRAVENOUS at 09:08

## 2022-01-01 RX ADMIN — POTASSIUM CHLORIDE: 2 INJECTION, SOLUTION, CONCENTRATE INTRAVENOUS at 05:11

## 2022-01-01 RX ADMIN — MEROPENEM AND SODIUM CHLORIDE 500 MG: 500 INJECTION, SOLUTION INTRAVENOUS at 03:07

## 2022-01-01 RX ADMIN — NOREPINEPHRINE BITARTRATE 0.7 MCG/KG/MIN: 4 INJECTION, SOLUTION INTRAVENOUS at 03:11

## 2022-01-01 RX ADMIN — ALBUTEROL SULFATE 2 PUFF: 90 AEROSOL, METERED RESPIRATORY (INHALATION) at 12:06

## 2022-01-01 RX ADMIN — HYDRALAZINE HYDROCHLORIDE 50 MG: 25 TABLET ORAL at 10:06

## 2022-01-01 RX ADMIN — MIDODRINE HYDROCHLORIDE 2.5 MG: 2.5 TABLET ORAL at 02:09

## 2022-01-01 RX ADMIN — PROPOFOL 130 MG: 10 INJECTION, EMULSION INTRAVENOUS at 10:01

## 2022-01-01 RX ADMIN — MUPIROCIN: 20 OINTMENT TOPICAL at 10:01

## 2022-01-01 RX ADMIN — GABAPENTIN 100 MG: 100 CAPSULE ORAL at 08:06

## 2022-01-01 RX ADMIN — IPRATROPIUM BROMIDE AND ALBUTEROL SULFATE 3 ML: .5; 3 SOLUTION RESPIRATORY (INHALATION) at 03:06

## 2022-01-01 RX ADMIN — SODIUM CHLORIDE, SODIUM LACTATE, POTASSIUM CHLORIDE, AND CALCIUM CHLORIDE 1000 ML: .6; .31; .03; .02 INJECTION, SOLUTION INTRAVENOUS at 09:11

## 2022-01-01 RX ADMIN — DIPHENHYDRAMINE HYDROCHLORIDE 25 MG: 25 CAPSULE ORAL at 09:01

## 2022-01-01 RX ADMIN — SUCCINYLCHOLINE CHLORIDE 120 MG: 20 INJECTION, SOLUTION INTRAMUSCULAR; INTRAVENOUS at 03:06

## 2022-01-01 RX ADMIN — SEVELAMER CARBONATE 800 MG: 800 TABLET, FILM COATED ORAL at 11:06

## 2022-01-01 RX ADMIN — ACETAMINOPHEN 650 MG: 325 TABLET ORAL at 12:06

## 2022-01-01 RX ADMIN — PROMETHAZINE HYDROCHLORIDE 6.25 MG: 25 INJECTION INTRAMUSCULAR; INTRAVENOUS at 08:09

## 2022-01-01 RX ADMIN — ACETAMINOPHEN 1000 MG: 10 INJECTION, SOLUTION INTRAVENOUS at 11:01

## 2022-01-01 RX ADMIN — LEVOTHYROXINE SODIUM 88 MCG: 0.09 TABLET ORAL at 08:11

## 2022-01-01 RX ADMIN — SODIUM CHLORIDE: 0.9 INJECTION, SOLUTION INTRAVENOUS at 11:01

## 2022-01-01 RX ADMIN — HYDROMORPHONE HYDROCHLORIDE 1 MG: 1 INJECTION, SOLUTION INTRAMUSCULAR; INTRAVENOUS; SUBCUTANEOUS at 05:09

## 2022-01-01 RX ADMIN — MEROPENEM AND SODIUM CHLORIDE 1 G: 1 INJECTION, SOLUTION INTRAVENOUS at 12:06

## 2022-01-01 RX ADMIN — ONDANSETRON 4 MG: 2 INJECTION INTRAMUSCULAR; INTRAVENOUS at 11:09

## 2022-01-01 RX ADMIN — NOREPINEPHRINE BITARTRATE 0.4 MCG/KG/MIN: 8 INJECTION, SOLUTION INTRAVENOUS at 06:11

## 2022-01-01 RX ADMIN — GABAPENTIN 100 MG: 100 CAPSULE ORAL at 03:06

## 2022-01-01 RX ADMIN — DILTIAZEM HYDROCHLORIDE 120 MG: 120 CAPSULE, COATED, EXTENDED RELEASE ORAL at 10:04

## 2022-01-01 RX ADMIN — ONDANSETRON 4 MG: 2 INJECTION INTRAMUSCULAR; INTRAVENOUS at 06:11

## 2022-01-01 RX ADMIN — PROPOFOL 50 MG: 10 INJECTION, EMULSION INTRAVENOUS at 04:09

## 2022-01-01 RX ADMIN — POTASSIUM CHLORIDE 10 MEQ: 7.46 INJECTION, SOLUTION INTRAVENOUS at 05:11

## 2022-01-01 RX ADMIN — DEXTROSE 0.07 MCG/KG/MIN: 5 SOLUTION INTRAVENOUS at 06:11

## 2022-01-01 RX ADMIN — HYDROMORPHONE HYDROCHLORIDE 0.5 MG: 1 INJECTION, SOLUTION INTRAMUSCULAR; INTRAVENOUS; SUBCUTANEOUS at 10:11

## 2022-01-01 RX ADMIN — MAGNESIUM SULFATE HEPTAHYDRATE 2 G: 40 INJECTION, SOLUTION INTRAVENOUS at 11:09

## 2022-01-01 RX ADMIN — LACTULOSE 20 G: 20 SOLUTION ORAL at 05:06

## 2022-01-01 RX ADMIN — IPRATROPIUM BROMIDE AND ALBUTEROL SULFATE 3 ML: 2.5; .5 SOLUTION RESPIRATORY (INHALATION) at 07:07

## 2022-01-01 RX ADMIN — CEFEPIME HYDROCHLORIDE 1 G: 1 INJECTION, SOLUTION INTRAVENOUS at 06:11

## 2022-01-01 RX ADMIN — HEPARIN SODIUM AND DEXTROSE 22 UNITS/KG/HR: 10000; 5 INJECTION INTRAVENOUS at 02:06

## 2022-01-01 RX ADMIN — HYDRALAZINE HYDROCHLORIDE 50 MG: 25 TABLET, FILM COATED ORAL at 10:06

## 2022-01-01 RX ADMIN — DEXAMETHASONE 6 MG: 4 TABLET ORAL at 10:06

## 2022-01-01 RX ADMIN — LORAZEPAM 2 MG: 2 INJECTION INTRAMUSCULAR; INTRAVENOUS at 08:11

## 2022-01-01 RX ADMIN — ROPINIROLE HYDROCHLORIDE 0.5 MG: 0.25 TABLET, FILM COATED ORAL at 02:06

## 2022-01-01 RX ADMIN — OXYCODONE HYDROCHLORIDE AND ACETAMINOPHEN 1 TABLET: 10; 325 TABLET ORAL at 12:01

## 2022-01-01 RX ADMIN — DEXTROSE 0.07 MCG/KG/MIN: 5 SOLUTION INTRAVENOUS at 10:11

## 2022-01-01 RX ADMIN — OXYCODONE HYDROCHLORIDE AND ACETAMINOPHEN 500 MG: 500 TABLET ORAL at 08:06

## 2022-01-01 RX ADMIN — HYDROCODONE BITARTRATE AND ACETAMINOPHEN 1 TABLET: 5; 325 TABLET ORAL at 12:01

## 2022-01-01 RX ADMIN — HYDROCODONE BITARTRATE AND ACETAMINOPHEN 1 TABLET: 5; 325 TABLET ORAL at 08:01

## 2022-01-01 RX ADMIN — SODIUM CHLORIDE, SODIUM LACTATE, POTASSIUM CHLORIDE, AND CALCIUM CHLORIDE 472 ML: .6; .31; .03; .02 INJECTION, SOLUTION INTRAVENOUS at 01:11

## 2022-01-01 RX ADMIN — HYDROMORPHONE HYDROCHLORIDE 0.5 MG: 1 INJECTION, SOLUTION INTRAMUSCULAR; INTRAVENOUS; SUBCUTANEOUS at 01:11

## 2022-01-01 RX ADMIN — HYDRALAZINE HYDROCHLORIDE 50 MG: 25 TABLET ORAL at 02:06

## 2022-01-01 RX ADMIN — POTASSIUM CHLORIDE 10 MEQ: 7.46 INJECTION, SOLUTION INTRAVENOUS at 03:09

## 2022-01-01 RX ADMIN — SMOFLIPID: 6; 6; 5; 3 INJECTION, EMULSION INTRAVENOUS at 05:11

## 2022-01-01 RX ADMIN — LORAZEPAM 2 MG: 2 INJECTION INTRAMUSCULAR; INTRAVENOUS at 12:11

## 2022-01-01 RX ADMIN — MORPHINE SULFATE 4 MG: 4 INJECTION, SOLUTION INTRAMUSCULAR; INTRAVENOUS at 12:11

## 2022-01-01 RX ADMIN — LACTOBACILLUS TAB 4 TABLET: TAB at 07:06

## 2022-01-01 RX ADMIN — ONDANSETRON 4 MG: 2 INJECTION INTRAMUSCULAR; INTRAVENOUS at 10:09

## 2022-01-01 RX ADMIN — LEVOTHYROXINE SODIUM ANHYDROUS 50 MCG: 100 INJECTION, POWDER, LYOPHILIZED, FOR SOLUTION INTRAVENOUS at 06:11

## 2022-01-01 RX ADMIN — MORPHINE SULFATE 2 MG: 2 INJECTION, SOLUTION INTRAMUSCULAR; INTRAVENOUS at 02:04

## 2022-01-01 RX ADMIN — HYDROCODONE BITARTRATE AND ACETAMINOPHEN 1 TABLET: 5; 325 TABLET ORAL at 09:04

## 2022-01-01 RX ADMIN — IPRATROPIUM BROMIDE AND ALBUTEROL SULFATE 3 ML: 2.5; .5 SOLUTION RESPIRATORY (INHALATION) at 03:07

## 2022-01-01 RX ADMIN — EPOETIN ALFA-EPBX 3300 UNITS: 10000 INJECTION, SOLUTION INTRAVENOUS; SUBCUTANEOUS at 09:06

## 2022-01-01 RX ADMIN — OXYCODONE HYDROCHLORIDE AND ACETAMINOPHEN 1 TABLET: 10; 325 TABLET ORAL at 03:01

## 2022-01-01 RX ADMIN — LEVOTHYROXINE SODIUM 88 MCG: 0.09 TABLET ORAL at 04:09

## 2022-01-01 RX ADMIN — ROPINIROLE HYDROCHLORIDE 0.25 MG: 0.25 TABLET, FILM COATED ORAL at 10:04

## 2022-01-01 RX ADMIN — MICONAZOLE NITRATE: 20 CREAM TOPICAL at 10:01

## 2022-01-01 RX ADMIN — MAGNESIUM SULFATE HEPTAHYDRATE 2 G: 40 INJECTION, SOLUTION INTRAVENOUS at 03:09

## 2022-01-01 RX ADMIN — POTASSIUM BICARBONATE 50 MEQ: 977.5 TABLET, EFFERVESCENT ORAL at 07:06

## 2022-01-01 RX ADMIN — DEXTROSE 250 MG: 50 INJECTION, SOLUTION INTRAVENOUS at 08:11

## 2022-01-01 RX ADMIN — GABAPENTIN 100 MG: 100 CAPSULE ORAL at 10:09

## 2022-01-01 RX ADMIN — LEVOFLOXACIN 250 MG: 250 TABLET, FILM COATED ORAL at 05:04

## 2022-01-01 RX ADMIN — LIDOCAINE HYDROCHLORIDE 5 ML: 20 JELLY TOPICAL at 08:11

## 2022-01-01 RX ADMIN — VANCOMYCIN HYDROCHLORIDE 1000 MG: 1 INJECTION, POWDER, LYOPHILIZED, FOR SOLUTION INTRAVENOUS at 11:04

## 2022-01-01 RX ADMIN — ROPINIROLE HYDROCHLORIDE 0.5 MG: 0.25 TABLET, FILM COATED ORAL at 04:06

## 2022-01-01 RX ADMIN — OXYCODONE HYDROCHLORIDE 10 MG: 5 TABLET ORAL at 05:09

## 2022-01-01 RX ADMIN — ENOXAPARIN SODIUM 30 MG: 30 INJECTION SUBCUTANEOUS at 08:01

## 2022-01-01 RX ADMIN — GUAIFENESIN 200 MG: 200 SOLUTION ORAL at 03:06

## 2022-01-01 RX ADMIN — HYDRALAZINE HYDROCHLORIDE 50 MG: 25 TABLET, FILM COATED ORAL at 02:06

## 2022-01-01 RX ADMIN — OXYCODONE HYDROCHLORIDE 10 MG: 5 TABLET ORAL at 01:09

## 2022-01-01 RX ADMIN — FLUCONAZOLE 100 MG: 2 INJECTION, SOLUTION INTRAVENOUS at 09:01

## 2022-01-01 RX ADMIN — CEPHALEXIN 500 MG: 250 CAPSULE ORAL at 01:01

## 2022-01-01 RX ADMIN — MORPHINE SULFATE 4 MG: 4 INJECTION, SOLUTION INTRAMUSCULAR; INTRAVENOUS at 01:09

## 2022-01-01 RX ADMIN — Medication 35 MG: at 10:11

## 2022-01-01 RX ADMIN — LIDOCAINE AND PRILOCAINE: 25; 25 CREAM TOPICAL at 09:07

## 2022-01-01 RX ADMIN — NYSTATIN AND TRIAMCINOLONE ACETONIDE: 100000; 1 CREAM TOPICAL at 12:04

## 2022-01-01 RX ADMIN — ACETAMINOPHEN 1000 MG: 10 INJECTION, SOLUTION INTRAVENOUS at 03:06

## 2022-01-01 RX ADMIN — LACTULOSE 20 G: 20 SOLUTION ORAL at 07:06

## 2022-01-01 RX ADMIN — HYDROCODONE BITARTRATE AND ACETAMINOPHEN 1 TABLET: 5; 325 TABLET ORAL at 11:04

## 2022-01-01 RX ADMIN — ONDANSETRON 4 MG: 2 INJECTION INTRAMUSCULAR; INTRAVENOUS at 10:06

## 2022-01-01 RX ADMIN — ACETAMINOPHEN 650 MG: 325 TABLET, FILM COATED ORAL at 08:01

## 2022-01-01 RX ADMIN — LIDOCAINE HYDROCHLORIDE 75 MG: 20 INJECTION INTRAVENOUS at 09:08

## 2022-01-01 RX ADMIN — CLINDAMYCIN IN 5 PERCENT DEXTROSE 900 MG: 18 INJECTION, SOLUTION INTRAVENOUS at 11:04

## 2022-01-01 RX ADMIN — LIDOCAINE HYDROCHLORIDE: 20 JELLY TOPICAL at 08:04

## 2022-01-01 RX ADMIN — DEXTROSE 250 MG: 50 INJECTION, SOLUTION INTRAVENOUS at 03:11

## 2022-01-01 RX ADMIN — FAMOTIDINE: 10 INJECTION INTRAVENOUS at 05:11

## 2022-01-01 RX ADMIN — SODIUM CHLORIDE: 0.9 INJECTION, SOLUTION INTRAVENOUS at 09:08

## 2022-01-01 RX ADMIN — SUCCINYLCHOLINE CHLORIDE 120 MG: 20 INJECTION, SOLUTION INTRAMUSCULAR; INTRAVENOUS at 04:09

## 2022-01-01 RX ADMIN — DIPHENHYDRAMINE HYDROCHLORIDE 25 MG: 25 CAPSULE ORAL at 09:04

## 2022-01-01 RX ADMIN — LACTOBACILLUS TAB 4 TABLET: TAB at 03:06

## 2022-01-01 RX ADMIN — LEVOTHYROXINE SODIUM 88 MCG: 0.09 TABLET ORAL at 06:09

## 2022-01-01 RX ADMIN — VANCOMYCIN HYDROCHLORIDE 1500 MG: 1.5 INJECTION, POWDER, LYOPHILIZED, FOR SOLUTION INTRAVENOUS at 07:06

## 2022-01-01 RX ADMIN — PHENYLEPHRINE HYDROCHLORIDE 100 MCG: 10 INJECTION INTRAVENOUS at 03:06

## 2022-01-01 RX ADMIN — DIAZEPAM 2 MG: 2 TABLET ORAL at 08:09

## 2022-01-01 RX ADMIN — THERA TABS 1 TABLET: TAB at 08:06

## 2022-01-01 RX ADMIN — OXYCODONE HYDROCHLORIDE 5 MG: 5 TABLET ORAL at 12:01

## 2022-01-01 RX ADMIN — PROMETHAZINE HYDROCHLORIDE 25 MG: 25 TABLET ORAL at 01:06

## 2022-01-01 RX ADMIN — HYDROCODONE BITARTRATE AND ACETAMINOPHEN 1 TABLET: 5; 325 TABLET ORAL at 11:09

## 2022-01-01 RX ADMIN — DILTIAZEM HYDROCHLORIDE 120 MG: 120 CAPSULE, COATED, EXTENDED RELEASE ORAL at 09:07

## 2022-01-01 RX ADMIN — CEFTRIAXONE SODIUM 1 G: 1 INJECTION, POWDER, FOR SOLUTION INTRAMUSCULAR; INTRAVENOUS at 11:11

## 2022-01-01 RX ADMIN — FENTANYL CITRATE 50 MCG: 50 INJECTION INTRAMUSCULAR; INTRAVENOUS at 05:09

## 2022-01-01 RX ADMIN — MORPHINE SULFATE 4 MG: 4 INJECTION, SOLUTION INTRAMUSCULAR; INTRAVENOUS at 02:06

## 2022-01-01 RX ADMIN — PROMETHAZINE HYDROCHLORIDE 6.25 MG: 25 INJECTION INTRAMUSCULAR; INTRAVENOUS at 03:09

## 2022-01-01 RX ADMIN — CHLORHEXIDINE GLUCONATE 15 ML: 1.2 RINSE ORAL at 02:11

## 2022-01-01 RX ADMIN — SODIUM CHLORIDE, SODIUM LACTATE, POTASSIUM CHLORIDE, AND CALCIUM CHLORIDE: .6; .31; .03; .02 INJECTION, SOLUTION INTRAVENOUS at 10:01

## 2022-01-01 RX ADMIN — LORAZEPAM 1 MG: 2 INJECTION INTRAMUSCULAR; INTRAVENOUS at 05:11

## 2022-01-01 RX ADMIN — HYDROCODONE BITARTRATE AND ACETAMINOPHEN 1 TABLET: 5; 325 TABLET ORAL at 10:09

## 2022-01-01 RX ADMIN — SEVELAMER CARBONATE 800 MG: 800 TABLET, FILM COATED ORAL at 04:06

## 2022-01-01 RX ADMIN — MORPHINE SULFATE 2 MG: 2 INJECTION, SOLUTION INTRAMUSCULAR; INTRAVENOUS at 11:11

## 2022-01-01 RX ADMIN — MAGNESIUM SULFATE 1 G: 1 INJECTION INTRAVENOUS at 04:11

## 2022-01-01 RX ADMIN — MORPHINE SULFATE 4 MG: 4 INJECTION, SOLUTION INTRAMUSCULAR; INTRAVENOUS at 04:09

## 2022-01-01 RX ADMIN — METRONIDAZOLE 500 MG: 5 INJECTION, SOLUTION INTRAVENOUS at 06:11

## 2022-01-01 RX ADMIN — DEXTROSE 1.7 MCG/KG/MIN: 5 SOLUTION INTRAVENOUS at 12:11

## 2022-01-01 RX ADMIN — OXYCODONE HYDROCHLORIDE 10 MG: 5 TABLET ORAL at 04:09

## 2022-01-01 RX ADMIN — OXYCODONE HYDROCHLORIDE 5 MG: 5 TABLET ORAL at 07:09

## 2022-01-01 RX ADMIN — LORAZEPAM 1 MG: 2 INJECTION INTRAMUSCULAR; INTRAVENOUS at 04:11

## 2022-01-01 RX ADMIN — DIPHENHYDRAMINE HYDROCHLORIDE 25 MG: 25 CAPSULE ORAL at 08:04

## 2022-01-01 RX ADMIN — HYDRALAZINE HYDROCHLORIDE 50 MG: 25 TABLET ORAL at 10:04

## 2022-01-01 RX ADMIN — IODIXANOL 100 ML: 320 INJECTION, SOLUTION INTRAVASCULAR at 12:04

## 2022-01-01 RX ADMIN — HEPARIN SODIUM AND DEXTROSE 18 UNITS/KG/HR: 10000; 5 INJECTION INTRAVENOUS at 11:06

## 2022-01-01 RX ADMIN — LORAZEPAM 1 MG: 2 INJECTION INTRAMUSCULAR; INTRAVENOUS at 12:11

## 2022-01-01 RX ADMIN — OXYCODONE HYDROCHLORIDE 10 MG: 5 TABLET ORAL at 11:09

## 2022-01-01 RX ADMIN — HYDROCODONE BITARTRATE AND ACETAMINOPHEN 1 TABLET: 5; 325 TABLET ORAL at 08:04

## 2022-01-01 RX ADMIN — SEVELAMER CARBONATE 800 MG: 800 TABLET, FILM COATED ORAL at 07:07

## 2022-01-01 RX ADMIN — HYDRALAZINE HYDROCHLORIDE 50 MG: 25 TABLET, FILM COATED ORAL at 05:06

## 2022-01-01 RX ADMIN — METRONIDAZOLE 500 MG: 5 INJECTION, SOLUTION INTRAVENOUS at 08:11

## 2022-01-01 RX ADMIN — HYDRALAZINE HYDROCHLORIDE 10 MG: 20 INJECTION INTRAMUSCULAR; INTRAVENOUS at 05:04

## 2022-01-01 RX ADMIN — SEVELAMER CARBONATE 800 MG: 800 TABLET, FILM COATED ORAL at 10:06

## 2022-01-01 RX ADMIN — MAGNESIUM SULFATE HEPTAHYDRATE 1 G: 1 INJECTION, SOLUTION INTRAVENOUS at 07:11

## 2022-01-01 RX ADMIN — MUPIROCIN: 20 OINTMENT TOPICAL at 07:06

## 2022-01-01 RX ADMIN — HYDROMORPHONE HYDROCHLORIDE 0.5 MG: 1 INJECTION, SOLUTION INTRAMUSCULAR; INTRAVENOUS; SUBCUTANEOUS at 07:11

## 2022-01-01 RX ADMIN — MEROPENEM AND SODIUM CHLORIDE 500 MG: 500 INJECTION, SOLUTION INTRAVENOUS at 03:09

## 2022-01-01 RX ADMIN — MORPHINE SULFATE 2 MG: 2 INJECTION, SOLUTION INTRAMUSCULAR; INTRAVENOUS at 02:11

## 2022-01-01 RX ADMIN — VASOPRESSIN: 20 INJECTION, SOLUTION INTRAVENOUS at 05:11

## 2022-01-01 RX ADMIN — GABAPENTIN 100 MG: 100 CAPSULE ORAL at 09:07

## 2022-01-01 RX ADMIN — HYDROCODONE BITARTRATE AND ACETAMINOPHEN 1 TABLET: 5; 325 TABLET ORAL at 10:04

## 2022-01-01 RX ADMIN — FENTANYL CITRATE 25 MCG: 50 INJECTION, SOLUTION INTRAMUSCULAR; INTRAVENOUS at 05:06

## 2022-01-01 RX ADMIN — CLOSTRIDIUM TETANI TOXOID ANTIGEN (FORMALDEHYDE INACTIVATED), CORYNEBACTERIUM DIPHTHERIAE TOXOID ANTIGEN (FORMALDEHYDE INACTIVATED), BORDETELLA PERTUSSIS TOXOID ANTIGEN (GLUTARALDEHYDE INACTIVATED), BORDETELLA PERTUSSIS FILAMENTOUS HEMAGGLUTININ ANTIGEN (FORMALDEHYDE INACTIVATED), BORDETELLA PERTUSSIS PERTACTIN ANTIGEN, AND BORDETELLA PERTUSSIS FIMBRIAE 2/3 ANTIGEN 0.5 ML: 5; 2; 2.5; 5; 3; 5 INJECTION, SUSPENSION INTRAMUSCULAR at 11:08

## 2022-01-01 RX ADMIN — FAMOTIDINE 20 MG: 10 INJECTION, SOLUTION INTRAVENOUS at 11:01

## 2022-01-01 RX ADMIN — MORPHINE SULFATE 2 MG: 2 INJECTION, SOLUTION INTRAMUSCULAR; INTRAVENOUS at 11:01

## 2022-01-01 RX ADMIN — DEXTROSE 250 MG: 50 INJECTION, SOLUTION INTRAVENOUS at 07:11

## 2022-01-01 RX ADMIN — HYDROMORPHONE HYDROCHLORIDE 1 MG: 1 INJECTION, SOLUTION INTRAMUSCULAR; INTRAVENOUS; SUBCUTANEOUS at 01:09

## 2022-01-01 RX ADMIN — VANCOMYCIN HYDROCHLORIDE 1000 MG: 750 INJECTION, POWDER, LYOPHILIZED, FOR SOLUTION INTRAVENOUS at 10:11

## 2022-01-01 RX ADMIN — LACTOBACILLUS TAB 4 TABLET: TAB at 04:06

## 2022-01-01 RX ADMIN — VANCOMYCIN HYDROCHLORIDE 1000 MG: 1 INJECTION, POWDER, LYOPHILIZED, FOR SOLUTION INTRAVENOUS at 05:04

## 2022-01-01 RX ADMIN — GUAIFENESIN 200 MG: 200 SOLUTION ORAL at 09:06

## 2022-01-01 RX ADMIN — PROPOFOL 90 MG: 10 INJECTION, EMULSION INTRAVENOUS at 03:06

## 2022-01-01 RX ADMIN — MORPHINE SULFATE 4 MG: 4 INJECTION, SOLUTION INTRAMUSCULAR; INTRAVENOUS at 10:11

## 2022-01-16 NOTE — PROGRESS NOTES
Pharmacokinetic Initial Assessment: IV Vancomycin    Assessment/Plan:    Initiate intravenous vancomycin with loading dose of 1000 mg once with subsequent doses when random concentrations are less than 20 mcg/mL  Desired empiric serum trough concentration is 10 to 15 mcg/mL  Draw vancomycin random level on 1/17/2022 at 14:30..  Pharmacy will continue to follow and monitor vancomycin.      Please contact pharmacy at extension [phone] with any questions regarding this assessment.     Thank you for the consult,   Natasha Zuluaga       Patient brief summary:  Shara Hutchins is a 71 y.o. female initiated on antimicrobial therapy with IV Vancomycin for treatment of suspected skin & soft tissue infection    Drug Allergies:   Review of patient's allergies indicates:   Allergen Reactions    Ciprofloxacin Other (See Comments)     Elevated liver enzymes      Pcn [penicillins] Anaphylaxis     TOLERATES ROCEPHIN WITHOUT DIFFICULTY       Actual Body Weight:   63.6 kg    Renal Function:   Estimated Creatinine Clearance: 9 mL/min (A) (based on SCr of 5.7 mg/dL (H)).,     Dialysis Method (if applicable):  intermittent HD- MWF    CBC (last 72 hours):  Recent Labs   Lab Result Units 01/16/22  1115   WBC K/uL 7.12   Hemoglobin g/dL 9.4*   Hematocrit % 29.7*   Platelets K/uL 138*   Gran % % 74.3*   Lymph % % 15.9*   Mono % % 5.1   Eosinophil % % 4.1   Basophil % % 0.3   Differential Method  Automated       Metabolic Panel (last 72 hours):  Recent Labs   Lab Result Units 01/16/22  1115   Sodium mmol/L 141   Potassium mmol/L 4.0   Chloride mmol/L 104   CO2 mmol/L 23   Glucose mg/dL 95   BUN mg/dL 29*   Creatinine mg/dL 5.7*   Albumin g/dL 3.1*   Total Bilirubin mg/dL 0.8   Alkaline Phosphatase U/L 98   AST U/L 12   ALT U/L 10   Magnesium mg/dL 1.6       Drug levels (last 3 results):  No results for input(s): VANCOMYCINRA, VANCOMYCINPE, VANCOMYCINTR in the last 72 hours.    Microbiologic Results:  Microbiology Results (last 7 days)       Procedure  Component Value Units Date/Time    Blood culture #2 **CANNOT BE ORDERED STAT** [[id number]] Collected: 01/16/22 1130    Order Status: Sent Specimen: Blood from Peripheral, Antecubital, Right Updated: 01/16/22 1147    Blood culture #1 **CANNOT BE ORDERED STAT** [[id number]] Collected: 01/16/22 1115    Order Status: Sent Specimen: Blood from Peripheral, Antecubital, Right Updated: 01/16/22 1129

## 2022-01-16 NOTE — Clinical Note
The site was marked. The left brachial was prepped. The site was prepped with ChloraPrep. The site was clipped. The patient was draped. The patient was positioned supine. The patient was secured using safety straps.

## 2022-01-16 NOTE — PROGRESS NOTES
Pharmacist Renal Dose Adjustment Note    Shara Hutchins is a 71 y.o. female being treated with the medication Merrem.    Recent Labs   Lab 01/16/22  1115   CREATININE 5.7*     Serum creatinine: 5.7 mg/dL (H) 01/16/22 1115  Estimated creatinine clearance: 9 mL/min (A)    Merrem 500 mg IV every 8 hours will be changed to Merrem 500 mg IV every 24 hours due to CrCl < 25 ml/min.    Pharmacist's Name: Natasha Zuluaga  Pharmacist's Extension: [phone]

## 2022-01-16 NOTE — Clinical Note
An angiography was performed of the AV fistula via hand injection with 10 mL of contrast multiple injections

## 2022-01-16 NOTE — H&P
[facility] - Emergency Dept    History & Physical      Patient Name: Shara Hutchins  MRN: [id number]  Admission Date: 1/16/2022  Attending Physician:  Aleksander Phan MD  Primary Care Provider: April Horton MD         Patient information was obtained from patient and ER records.     Subjective:     Principal Problem:<principal problem not specified>    Chief Complaint:   Chief Complaint   Patient presents with    Vaginal Pain     SORES IN VAGINAL AREA, SOME BLEEDING        HPI:  Patient presents to the emergency department with vulvar cellulitis that is recurrent.  The patient states that about 1 week ago she developed itching, redness, increased pain to the vulva.  She has a pertinent past medical history of fall vol cancer status post radiation and has since had recurrent episodes of cellulitis in the area.  She reports applying different types of creams such as Neosporin, Reginald Butt paste, calseptin, Aquaphor, and others with no improvement and about 3 days ago the symptoms worsened.  Her history is also complicated by chronic diarrhea since being treated with radiation in which she has loose incontinent stools multiple times per day.    The patient's current problem of cellulitis begins at superior-most aspect of her vulva and extends across the entire perineum to between the gluteal folds. The cellulitis also extends into the groin bilaterally.  The patient's pain has progressed to severe, nonradiating, described as burning, itching and dull.  Unrelieved by any home analgesics or topical medications.  Associated with intermittent low grade fever (not measured at home), occasional bleeding from excoriations of the skin, and hardened swelling of the affected area.  Or she also has intermittent nausea she believes is associated with the severe pain.  No vomiting.  The patient has been evaluated and treated by Dr. Blanca Neal in the past.  The patient was treated with meropenem and vancomycin in the  emergency department.  PCN allergy noted and the patient reported tolerating meropenem and the past.    Imaging obtained in the emergency department incidentally revealed an obstructive, right-sided nephrolithiasis by 6 mm kidney stone that is causing perinephric fat stranding/hydronephrosis..  She has a history of nephrolithiasis and a CT.  The patient did receive IV contrast in the emergency department for this imaging and has hemodialysis 3 times a week overseen by Dr. Dietz.  The patient denied flank pain, hematuria, increased urination or urgency.  She does have a history of anuria.        Past Medical History:   Diagnosis Date    Cellulitis of trunk     Perineum, numerous episodes    Chronic diarrhea 02/10/2020    Short-gut syndrome    Diabetes mellitus, type 2     pt has not had for years after losing weight    End stage renal disease on dialysis 01/07/2018    Fatigue     Hypertension     Hypothyroidism 2/10/2020    Iron deficiency anemia due to chronic blood loss 1/7/2018    Pulmonary hypertension 3/20/2020    Vulvar cancer 1/7/2019       Past Surgical History:   Procedure Laterality Date    ABDOMINAL SURGERY      APPENDECTOMY      AV FISTULA PLACEMENT Left 2018    BACK SURGERY      BLADDER FULGURATION N/A 6/16/2020    Procedure: FULGURATION, BLADDER;  Surgeon: Damari Barber Jr., MD;  Location: [facility] OR;  Service: Urology;  Laterality: N/A;    carpel tunnel surgery once on each hand      CERVICAL FUSION      x 4    cervix repair      CHOLECYSTECTOMY  2000    COLECTOMY      CYSTOSCOPY W/ RETROGRADES  6/16/2020    Procedure: CYSTOSCOPY, WITH RETROGRADE PYELOGRAM;  Surgeon: Damari Barber Jr., MD;  Location: [facility] OR;  Service: Urology;;    CYSTOSCOPY W/ RETROGRADES Right 4/20/2021    Procedure: CYSTOSCOPY, WITH RETROGRADE PYELOGRAM;  Surgeon: Damari Barber Jr., MD;  Location: [facility] OR;  Service: Urology;  Laterality: Right;    CYSTOSCOPY W/ URETERAL STENT PLACEMENT Right 3/16/2021    Procedure: CYSTOSCOPY, WITH  URETERAL STENT INSERTION;  Surgeon: Damari Barber Jr., MD;  Location: [facility] OR;  Service: Urology;  Laterality: Right;    CYSTOSCOPY W/ URETERAL STENT PLACEMENT Right 5/18/2021    Procedure: CYSTOSCOPY, WITH URETERAL STENT INSERTION;  Surgeon: Damari Barber Jr., MD;  Location: [facility] OR;  Service: Urology;  Laterality: Right;    CYSTOSCOPY W/ URETERAL STENT REMOVAL Right 4/20/2021    Procedure: CYSTOSCOPY, WITH URETERAL STENT REMOVAL;  Surgeon: Damari Barber Jr., MD;  Location: [facility] OR;  Service: Urology;  Laterality: Right;    CYSTOSCOPY WITH URETEROSCOPY, RETROGRADE PYELOGRAPHY, AND INSERTION OF STENT Right 3/30/2021    Procedure: CYSTOSCOPY, WITH RETROGRADE PYELOGRAM AND URETERAL STENT INSERTION;  Surgeon: Damari Barber Jr., MD;  Location: [facility] OR;  Service: Urology;  Laterality: Right;    DIAGNOSTIC LAPAROSCOPY N/A 2/13/2020    Procedure: LAPAROSCOPY, DIAGNOSTIC;  Surgeon: Robbi Lovell III, MD;  Location: [facility] OR;  Service: General;  Laterality: N/A;    HYSTERECTOMY  1980    ILEOSTOMY N/A 2/13/2020    Procedure: CREATION, ILEOSTOMY Loop;  Surgeon: Robbi Lovell III, MD;  Location: [facility] OR;  Service: General;  Laterality: N/A;    ILEOSTOMY CLOSURE N/A 5/18/2020    Procedure: CLOSURE, ILEOSTOMY;  Surgeon: Robbi Lovell III, MD;  Location: [facility] OR;  Service: General;  Laterality: N/A;    LASER LITHOTRIPSY Right 3/30/2021    Procedure: LITHOTRIPSY, USING LASER;  Surgeon: Damari Barber Jr., MD;  Location: [facility] OR;  Service: Urology;  Laterality: Right;    LUMBAR LAMINECTOMY      LYSIS OF ADHESIONS N/A 2/13/2020    Procedure: LYSIS, ADHESIONS;  Surgeon: Robbi Lovell III, MD;  Location: [facility] OR;  Service: General;  Laterality: N/A;    NECK SURGERY      PHLEBOGRAPHY  2/28/2020    Procedure: VENOGRAM;  Surgeon: Robbi Lovell III, MD;  Location: [facility] OR;  Service: General;;    REMOVAL OF VASCULAR ACCESS PORT Right 2/13/2020    Procedure: REMOVAL, VASCULAR ACCESS PORT;  Surgeon: Robbi LEE  Nas MANZANARES MD;  Location: [facility] OR;  Service: General;  Laterality: Right;    RETROGRADE PYELOGRAPHY  3/16/2021    Procedure: PYELOGRAM, RETROGRADE;  Surgeon: Damari Barber Jr., MD;  Location: [facility] OR;  Service: Urology;;    RETROGRADE PYELOGRAPHY Right 5/18/2021    Procedure: PYELOGRAM, RETROGRADE;  Surgeon: Damari Barber Jr., MD;  Location: [facility] OR;  Service: Urology;  Laterality: Right;    SHOULDER SURGERY      URETEROSCOPIC REMOVAL OF URETERIC CALCULUS Right 3/30/2021    Procedure: REMOVAL, CALCULUS, URETER, URETEROSCOPIC;  Surgeon: Damari Barber Jr., MD;  Location: [facility] OR;  Service: Urology;  Laterality: Right;    URETEROSCOPIC REMOVAL OF URETERIC CALCULUS Right 4/20/2021    Procedure: REMOVAL, CALCULUS, URETER, URETEROSCOPIC;  Surgeon: Damari Barber Jr., MD;  Location: [facility] OR;  Service: Urology;  Laterality: Right;    vulva cancer         Review of patient's allergies indicates:   Allergen Reactions    Ciprofloxacin Other (See Comments)     Elevated liver enzymes      Pcn [penicillins] Anaphylaxis     TOLERATES ROCEPHIN WITHOUT DIFFICULTY       Current Facility-Administered Medications on File Prior to Encounter   Medication    electrolyte-S (ISOLYTE)    lactated ringers infusion    LIDOcaine (PF) 10 mg/ml (1%) injection 5 mg    [DISCONTINUED] HYDROmorphone (PF) injection 0.2 mg    [DISCONTINUED] oxyCODONE immediate release tablet 5 mg     Current Outpatient Medications on File Prior to Encounter   Medication Sig    CHOLESTYRAMINE LIGHT 4 gram packet Take 1 packet by mouth 2 (two) times daily.    cyclobenzaprine (FLEXERIL) 10 MG tablet Take 1 tablet (10 mg total) by mouth 3 (three) times daily as needed for Muscle spasms.    diltiaZEM (CARDIZEM CD) 120 MG Cp24 Take 1 capsule (120 mg total) by mouth once daily.    diphenhydrAMINE (BENADRYL) 25 mg capsule Take 25 mg by mouth every 12 (twelve) hours as needed for Itching.    gabapentin (NEURONTIN) 100 MG capsule Take 100 mg by mouth nightly as needed. HCS     levothyroxine (SYNTHROID) 88 MCG tablet Take 1 tablet (88 mcg total) by mouth before breakfast.    promethazine (PHENERGAN) 25 MG tablet TAKE 1 TABLET(25 MG) BY MOUTH TWICE DAILY AS NEEDED FOR NAUSEA (Patient taking differently: Take 25 mg by mouth 2 (two) times daily as needed for Nausea.)    sertraline (ZOLOFT) 50 MG tablet Take 50 mg by mouth every evening.    doxycycline (VIBRAMYCIN) 100 MG Cap Take 100 mg by mouth every 12 (twelve) hours. HCS    ferric citrate (AURYXIA) 210 mg iron Tab Take 2 tablets by mouth 3 (three) times daily with meals.    traMADoL (ULTRAM) 50 mg tablet Take 1 tablet (50 mg total) by mouth every 8 (eight) hours as needed for Pain.     Family History       Problem Relation (Age of Onset)    Cancer Mother    Heart disease Mother, Father, Sister    Hypertension Mother    No Known Problems Daughter          Tobacco Use    Smoking status: Former Smoker     Packs/day: 1.00     Years: 33.00     Pack years: 33.00     Types: Cigarettes     Quit date: 2000     Years since quittin.0    Smokeless tobacco: Former User     Quit date:    Substance and Sexual Activity    Alcohol use: No    Drug use: No    Sexual activity: Not on file     Review of Systems   Constitutional: Positive for fever.   HENT: Negative.    Respiratory: Negative for shortness of breath.    Cardiovascular: Negative.    Gastrointestinal: Positive for diarrhea and nausea. Negative for abdominal pain, anal bleeding, blood in stool and vomiting.   Endocrine: Negative.    Genitourinary: Positive for decreased urine volume, pelvic pain and vaginal pain. Negative for dysuria, flank pain, hematuria, vaginal bleeding and vaginal discharge.        Anuria   Skin: Positive for rash and wound.   Neurological: Negative.    All other systems reviewed and are negative.    Objective:     Vital Signs (Most Recent):  Temp: 98.9 °F (37.2 °C) (22 1026)  Pulse: 72 (22 1438)  Resp: 14 (22 1438)  BP: (!) 161/71 (22  1438)  SpO2: 97 % (01/16/22 1438) Vital Signs (24h Range):  Temp:  [98.9 °F (37.2 °C)] 98.9 °F (37.2 °C)  Pulse:  [68-86] 72  Resp:  [11-20] 14  SpO2:  [94 %-99 %] 97 %  BP: (146-197)/(70-84) 161/71     Weight: 62.6 kg (138 lb)  Body mass index is 21.29 kg/m².    Physical Exam  Vitals and nursing note reviewed.   Constitutional:       General: She is not in acute distress.     Appearance: She is not toxic-appearing.   HENT:      Head: Normocephalic and atraumatic.      Mouth/Throat:      Mouth: Mucous membranes are moist.      Pharynx: No oropharyngeal exudate or posterior oropharyngeal erythema.   Eyes:      General: No scleral icterus.        Right eye: No discharge.         Left eye: No discharge.      Conjunctiva/sclera: Conjunctivae normal.   Cardiovascular:      Rate and Rhythm: Normal rate and regular rhythm.      Pulses: Normal pulses.      Heart sounds: Murmur heard.        Comments: Systolic murmur, 2/6, no thrill  Pulmonary:      Effort: Pulmonary effort is normal. No respiratory distress.      Breath sounds: No wheezing or rales.   Abdominal:      General: There is no distension.      Palpations: Abdomen is soft.      Tenderness: There is no abdominal tenderness. There is no right CVA tenderness, left CVA tenderness or guarding.   Genitourinary:     Pubic Area: Rash present.      Labia:         Right: Rash and tenderness present.         Left: Rash and tenderness present.        Musculoskeletal:      Cervical back: Neck supple. No rigidity.      Right lower leg: No edema.      Left lower leg: No edema.   Lymphadenopathy:      Cervical: No cervical adenopathy.      Lower Body: No right inguinal adenopathy. No left inguinal adenopathy.   Skin:     General: Skin is warm and dry.      Capillary Refill: Capillary refill takes less than 2 seconds.      Coloration: Skin is not jaundiced.      Findings: Rash present.   Neurological:      General: No focal deficit present.      Mental Status: She is alert and  oriented to person, place, and time.      Cranial Nerves: No cranial nerve deficit.      Motor: No weakness.   Psychiatric:         Mood and Affect: Mood normal.         Behavior: Behavior normal.              Significant Labs: All pertinent labs within the past 24 hours have been reviewed.  mostly unremarkable, chronic anemia is stable and ESKD noted    Significant Imaging: I have reviewed all pertinent imaging results/findings within the past 24 hours.  CT with right-sided hydronephrosis appreciated, right kidney is greater than left kidney.  No subcutaneous soft tissue swelling appreciated of the vulvar area questionably around the rectum.  Chest radiograph is unremarkable for acute cardiopulmonary processes.    Assessment/Plan:   The patient is an older nondiabetic and afebrile female presenting to the emergency department with a significant, and indurated cellulitis of the vulva and perineum.  Low suspicion for necrotizing fasciitis or Iman's gangrene at this time but does need IV antibiotics for treatment. CT imaging didn't reveal deep tissue infection or abcess however incidentally identified a right obstructive uropathy in the setting of ESKD and anuria. Patient was treated with Merrem and Vanc in the ED. PCN hx noted and patient reported having Merrem in the past without difficulty (verified having 12/8/20 encounter, also ceftriaxone for  conditions in past year).    The patient is at high risk for decline given consideration of her age and complex history of diseases.    Diagnoses and Plan  1. Acute cellulitis of the vulva and perineum   Plan - Merrem, Vanc, ID consultation, wound care, VTE px with lovenox renal dosed, Fall precautions, NPO after midnight   2. Obstructive right nephrolitiasis with severe hydronephrosis   Plan - Urology consultation, continue same abx for now  3. ESKD - seems stable but had IV contrast in ED   Plan - gentle IVF hydration x1day, Renal consultation for HD    VTE Risk  Mitigation (From admission, onward)           Ordered     enoxaparin injection 30 mg  Daily         01/16/22 1502     IP VTE HIGH RISK PATIENT  Once         01/16/22 1502     Place sequential compression device  Until discontinued         01/16/22 1502                  MDM  Number of Diagnoses or Management Options     Amount and/or Complexity of Data Reviewed  Clinical lab tests: reviewed and ordered  Tests in the radiology section of CPT®: reviewed  Review and summarize past medical records: yes (History of vulvar CA sp radiation.)  Discuss the patient with other providers: yes (Dr. DIANA Meade and PADMA Ontiveros NP in ED)  Independent visualization of images, tracings, or specimens: yes (CT with rt hydronephrosis appreciated, larger than lt kidney. Minimal SQ vulvar soft tissue swelling appreciated. Bowel gas noted, without an obstructive patter appreciated.)    Patient Progress  Patient progress: stable      Jeannie Ag NP  Department of Hospital Medicine   [facility] - Emergency Dept

## 2022-01-16 NOTE — ED PROVIDER NOTES
Encounter Date: 1/16/2022       History     Chief Complaint   Patient presents with    Vaginal Pain     SORES IN VAGINAL AREA, SOME BLEEDING     71-year-old well-appearing female presents emergency department past medical history includes diabetes, end-stage renal disease currently on hemodialysis Monday Wednesday Friday, hypertension, hypothyroidism, vulvar cancer in which she received radiation and chemotherapy for.  The patient reports that secondary to radiation she gets irritation to the perineal area and subsequent cellulitis.  The patient reports that for the last 2 weeks she has had some increased swelling to the perineal area and tenderness and reports she feels like she has cellulitis again.  Reports that she has had low-grade fevers        Review of patient's allergies indicates:   Allergen Reactions    Ciprofloxacin Other (See Comments)     Elevated liver enzymes      Pcn [penicillins] Anaphylaxis     TOLERATES ROCEPHIN WITHOUT DIFFICULTY     Past Medical History:   Diagnosis Date    Cellulitis of trunk     Perineum, numerous episodes    Chronic diarrhea 02/10/2020    Short-gut syndrome    Diabetes mellitus, type 2     pt has not had for years after losing weight    End stage renal disease on dialysis 01/07/2018    Fatigue     Hypertension     Hypothyroidism 2/10/2020    Iron deficiency anemia due to chronic blood loss 1/7/2018    Pulmonary hypertension 3/20/2020    Vulvar cancer 1/7/2019     Past Surgical History:   Procedure Laterality Date    ABDOMINAL SURGERY      APPENDECTOMY      AV FISTULA PLACEMENT Left 2018    BACK SURGERY      BLADDER FULGURATION N/A 6/16/2020    Procedure: FULGURATION, BLADDER;  Surgeon: Damari Barber Jr., MD;  Location: [facility];  Service: Urology;  Laterality: N/A;    carpel tunnel surgery once on each hand      CERVICAL FUSION      x 4    cervix repair      CHOLECYSTECTOMY  2000    COLECTOMY      CYSTOSCOPY W/ RETROGRADES  6/16/2020    Procedure:  CYSTOSCOPY, WITH RETROGRADE PYELOGRAM;  Surgeon: Damari Barber Jr., MD;  Location: [facility] OR;  Service: Urology;;    CYSTOSCOPY W/ RETROGRADES Right 4/20/2021    Procedure: CYSTOSCOPY, WITH RETROGRADE PYELOGRAM;  Surgeon: Damari Barber Jr., MD;  Location: [facility] OR;  Service: Urology;  Laterality: Right;    CYSTOSCOPY W/ URETERAL STENT PLACEMENT Right 3/16/2021    Procedure: CYSTOSCOPY, WITH URETERAL STENT INSERTION;  Surgeon: Damari Barber Jr., MD;  Location: [facility] OR;  Service: Urology;  Laterality: Right;    CYSTOSCOPY W/ URETERAL STENT PLACEMENT Right 5/18/2021    Procedure: CYSTOSCOPY, WITH URETERAL STENT INSERTION;  Surgeon: Damari Barber Jr., MD;  Location: [facility] OR;  Service: Urology;  Laterality: Right;    CYSTOSCOPY W/ URETERAL STENT REMOVAL Right 4/20/2021    Procedure: CYSTOSCOPY, WITH URETERAL STENT REMOVAL;  Surgeon: Damari Barber Jr., MD;  Location: [facility] OR;  Service: Urology;  Laterality: Right;    CYSTOSCOPY WITH URETEROSCOPY, RETROGRADE PYELOGRAPHY, AND INSERTION OF STENT Right 3/30/2021    Procedure: CYSTOSCOPY, WITH RETROGRADE PYELOGRAM AND URETERAL STENT INSERTION;  Surgeon: Damari Barber Jr., MD;  Location: [facility] OR;  Service: Urology;  Laterality: Right;    DIAGNOSTIC LAPAROSCOPY N/A 2/13/2020    Procedure: LAPAROSCOPY, DIAGNOSTIC;  Surgeon: Robbi Lovell III, MD;  Location: [facility] OR;  Service: General;  Laterality: N/A;    HYSTERECTOMY  1980    ILEOSTOMY N/A 2/13/2020    Procedure: CREATION, ILEOSTOMY Loop;  Surgeon: Robbi Lovell III, MD;  Location: [facility] OR;  Service: General;  Laterality: N/A;    ILEOSTOMY CLOSURE N/A 5/18/2020    Procedure: CLOSURE, ILEOSTOMY;  Surgeon: Robbi Lovell III, MD;  Location: [facility] OR;  Service: General;  Laterality: N/A;    LASER LITHOTRIPSY Right 3/30/2021    Procedure: LITHOTRIPSY, USING LASER;  Surgeon: Damari Barber Jr., MD;  Location: [facility] OR;  Service: Urology;  Laterality: Right;    LUMBAR LAMINECTOMY      LYSIS OF ADHESIONS N/A  2020    Procedure: LYSIS, ADHESIONS;  Surgeon: Robbi Lovell III, MD;  Location: [facility] OR;  Service: General;  Laterality: N/A;    NECK SURGERY      PHLEBOGRAPHY  2020    Procedure: VENOGRAM;  Surgeon: Robbi Lovell III, MD;  Location: [facility] OR;  Service: General;;    REMOVAL OF VASCULAR ACCESS PORT Right 2020    Procedure: REMOVAL, VASCULAR ACCESS PORT;  Surgeon: Robbi Lovell III, MD;  Location: [facility] OR;  Service: General;  Laterality: Right;    RETROGRADE PYELOGRAPHY  3/16/2021    Procedure: PYELOGRAM, RETROGRADE;  Surgeon: Damari Barber Jr., MD;  Location: [facility] OR;  Service: Urology;;    RETROGRADE PYELOGRAPHY Right 2021    Procedure: PYELOGRAM, RETROGRADE;  Surgeon: Damari Barber Jr., MD;  Location: [facility];  Service: Urology;  Laterality: Right;    SHOULDER SURGERY      URETEROSCOPIC REMOVAL OF URETERIC CALCULUS Right 3/30/2021    Procedure: REMOVAL, CALCULUS, URETER, URETEROSCOPIC;  Surgeon: Damari Barber Jr., MD;  Location: [facility] OR;  Service: Urology;  Laterality: Right;    URETEROSCOPIC REMOVAL OF URETERIC CALCULUS Right 2021    Procedure: REMOVAL, CALCULUS, URETER, URETEROSCOPIC;  Surgeon: Damari Barber Jr., MD;  Location: [facility];  Service: Urology;  Laterality: Right;    vulva cancer       Family History   Problem Relation Age of Onset    Heart disease Mother     Hypertension Mother     Cancer Mother         lung    Heart disease Father     Heart disease Sister         Open heart surgery    No Known Problems Daughter      Social History     Tobacco Use    Smoking status: Former Smoker     Packs/day: 1.00     Years: 33.00     Pack years: 33.00     Types: Cigarettes     Quit date: 2000     Years since quittin.0    Smokeless tobacco: Former User     Quit date:    Substance Use Topics    Alcohol use: No    Drug use: No     Review of Systems   Constitutional: Positive for chills and fever.   HENT: Negative.    Eyes: Negative.     Respiratory: Negative.    Cardiovascular: Negative.    Genitourinary: Positive for vaginal pain.        Swelling, tenderness, redness to the perineal area   Musculoskeletal: Negative.    Neurological: Negative.    Hematological: Negative.    Psychiatric/Behavioral: Negative.        Physical Exam     Initial Vitals [01/16/22 1026]   BP Pulse Resp Temp SpO2   (!) 197/84 86 20 98.9 °F (37.2 °C) (!) 94 %      MAP       --         Physical Exam    Nursing note and vitals reviewed.  Constitutional: She appears well-developed and well-nourished.   HENT:   Head: Normocephalic and atraumatic.   Eyes: Pupils are equal, round, and reactive to light.   Cardiovascular: Normal rate, regular rhythm, normal heart sounds and intact distal pulses.   Pulmonary/Chest: Breath sounds normal.   Abdominal: Abdomen is soft. Bowel sounds are normal.   Musculoskeletal:         General: Normal range of motion.     Neurological: She is alert and oriented to person, place, and time. She has normal strength.   Skin:   Swelling to the labia, and perineal area all the way to the rectum         ED Course   Procedures  Labs Reviewed   CBC W/ AUTO DIFFERENTIAL - Abnormal; Notable for the following components:       Result Value    RBC 2.55 (*)     Hemoglobin 9.4 (*)     Hematocrit 29.7 (*)      (*)     MCH 36.9 (*)     MCHC 31.6 (*)     RDW 15.0 (*)     Platelets 138 (*)     Gran % 74.3 (*)     Lymph % 15.9 (*)     All other components within normal limits   COMPREHENSIVE METABOLIC PANEL - Abnormal; Notable for the following components:    BUN 29 (*)     Creatinine 5.7 (*)     Calcium 8.3 (*)     Albumin 3.1 (*)     eGFR if  8.0 (*)     eGFR if non  6.9 (*)     All other components within normal limits   PROCALCITONIN - Abnormal; Notable for the following components:    Procalcitonin 0.67 (*)     All other components within normal limits   CULTURE, BLOOD   CULTURE, BLOOD   LACTIC ACID, PLASMA   SARS-COV-2 RNA  AMPLIFICATION, QUAL   MAGNESIUM          Imaging Results          CT Abdomen Pelvis With Contrast (Final result)  Result time 01/16/22 12:39:33    Final result by Justino Rivera MD (01/16/22 12:39:33)                 Impression:      Severe right hydroureteronephrosis; with right ureteral stent in place and 6 mm calculus within the mid right ureter.    Right perinephric stranding and urothelial enhancement suggestive of pyelonephritis/urinary tract infection.  Please correlate with urinalysis.    Bilateral nephrolithiasis.    Stable intrahepatic and extrahepatic bile duct dilation, status post cholecystectomy.  Please correlate with bilirubin levels.    Moderate volume gas and fecal material within the colon suggesting constipation.    Extensive aortoiliac atherosclerosis.      Electronically signed by: Justino Rivera MD  Date:    01/16/2022  Time:    12:39             Narrative:    EXAMINATION:  CT ABDOMEN PELVIS WITH CONTRAST    CLINICAL HISTORY:  Abdominal abscess/infection suspected;    TECHNIQUE:  100 cc Omnipaque 350 was administered.    CMS Mandated Quality Data-CT Radiation Dose-436    All CT scans at this facility dose modulation, iterative reconstruction, and or weight-based dosing when appropriate to reduce radiation dose to as low as reasonably achievable.    COMPARISON:  Abdominal sonogram 06/01/2021, and CT abdomen pelvis 03/15/2021    FINDINGS:  Lung bases are clear.  Bone window images demonstrate levocurvature of the lumbar spine.  Degenerative changes of the spine.  No acute or aggressive osseous abnormality.    No focal hepatic lesion.  Gallbladder is surgically absent.  Dilation of intrahepatic and extrahepatic biliary systems.  The extrahepatic bile duct measures up to 10 mm in diameter.  Spleen appears normal.  Left upper quadrant splenule.  Pancreas is atrophic.    No adrenal lesion.  Bilateral nephrolithiasis and bilateral renal atrophy.  Severe right hydroureteronephrosis, with double-J  ureteral stent in place.  The proximal aspect of the stent is formed within the inferior aspect of the dilated right renal pelvis, and the distal portion of the stent is present within the urinary bladder.  There is a 6 mm calculus within the midportion of the right ureter.  Right-sided urothelial enhancement and perinephric stranding.  Left ureter is normal in caliber.  No focal lesion of the urinary bladder is evident.    Stomach is largely collapsed.  No evidence of small-bowel obstruction.  Postoperative changes involving the proximal colon.  Moderate volume gas and fecal material within the colon.    No free fluid or free air within the abdomen or pelvis.  Extensive aortoiliac atherosclerotic calcification.  Normal caliber abdominal aorta.  Shotty retroperitoneal lymph nodes.  Subcutaneous edema of the thighs.                               X-Ray Chest AP Portable (Final result)  Result time 01/16/22 12:07:22    Final result by uJstino Rivera MD (01/16/22 12:07:22)                 Impression:      Cardiomegaly.  No acute pulmonary pathology.      Electronically signed by: Justino Rivera MD  Date:    01/16/2022  Time:    12:07             Narrative:    EXAMINATION:  XR CHEST AP PORTABLE    CLINICAL HISTORY:  fever;    COMPARISON:  09/15/2021    FINDINGS:  Cardiac silhouette size is enlarged and stable from prior.  Atherosclerotic calcification of the aorta.  No confluent airspace disease.  No pleural fluid or pneumothorax.  Stent or graft within the left brachial region.                                 Medications   meropenem-0.9% sodium chloride 1 g/50 mL IVPB (1 g Intravenous New Bag 1/16/22 1422)   vancomycin - pharmacy to dose (has no administration in time range)   vancomycin in dextrose 5 % 1 gram/250 mL IVPB 1,000 mg (has no administration in time range)   sodium chloride 0.9% flush 10 mL (has no administration in time range)   enoxaparin injection 30 mg (has no administration in time range)    HYDROcodone-acetaminophen 5-325 mg per tablet 1 tablet (has no administration in time range)   ondansetron injection 4 mg (has no administration in time range)   morphine injection 2 mg (has no administration in time range)   morphine injection 2 mg (2 mg Intravenous Given 1/16/22 1138)   ondansetron injection 4 mg (4 mg Intravenous Given 1/16/22 1133)   iodixanoL (VISIPAQUE 320) injection 100 mL (100 mLs Intravenous Given 1/16/22 1229)   morphine injection 2 mg (2 mg Intravenous Given 1/16/22 1244)                          Clinical Impression:   Final diagnoses:  [L03.90] Cellulitis, unspecified cellulitis site (Primary)          ED Disposition Condition    Admit               ANDRÉS Amos  01/16/22 4405

## 2022-01-16 NOTE — CONSULTS
Nephrology Consult Note        Patient Name: Shara Hutchins  MRN: [id number]    Patient Class: IP- Inpatient   Admission Date: 1/16/2022  Length of Stay: 0 days  Date of Service: 1/16/2022    Attending Physician: Aleksander Phan MD  Primary Care Provider: April Horton MD    Reason for Consult: esrd/anemia/htn/shpt/vulvar cellulitis    SUBJECTIVE:     HPI: ESRD patient on HD MWF presents to the emergency department with recurrent vulvar cellulitis, complicated by chronic diarrhea since being treated with radiation. Unrelieved by any home analgesics or topical medications. Associated with intermittent low grade fever, occasional bleeding from excoriations of the skin, and hardened swelling of the affected area. She has intermittent nausea associated with the severe pain.     Imaging obtained in the emergency department incidentally revealed an obstructive, right-sided nephrolithiasis by 6 mm kidney stone causing perinephric fat stranding/hydronephrosis. The patient denied flank pain, hematuria, increased urination or urgency.    Past Medical History:   Diagnosis Date    Cellulitis of trunk     Perineum, numerous episodes    Chronic diarrhea 02/10/2020    Short-gut syndrome    Diabetes mellitus, type 2     pt has not had for years after losing weight    End stage renal disease on dialysis 01/07/2018    Fatigue     Hypertension     Hypothyroidism 2/10/2020    Iron deficiency anemia due to chronic blood loss 1/7/2018    Pulmonary hypertension 3/20/2020    Vulvar cancer 1/7/2019     Past Surgical History:   Procedure Laterality Date    ABDOMINAL SURGERY      APPENDECTOMY      AV FISTULA PLACEMENT Left 2018    BACK SURGERY      BLADDER FULGURATION N/A 6/16/2020    Procedure: FULGURATION, BLADDER;  Surgeon: Damari Barber Jr., MD;  Location[facility];  Service: Urology;  Laterality: N/A;    carpel tunnel surgery once on each hand      CERVICAL FUSION      x 4    cervix repair      CHOLECYSTECTOMY  2000     COLECTOMY      CYSTOSCOPY W/ RETROGRADES  6/16/2020    Procedure: CYSTOSCOPY, WITH RETROGRADE PYELOGRAM;  Surgeon: Damari Barber Jr., MD;  Location: [facility] OR;  Service: Urology;;    CYSTOSCOPY W/ RETROGRADES Right 4/20/2021    Procedure: CYSTOSCOPY, WITH RETROGRADE PYELOGRAM;  Surgeon: Damari Barber Jr., MD;  Location: [facility] OR;  Service: Urology;  Laterality: Right;    CYSTOSCOPY W/ URETERAL STENT PLACEMENT Right 3/16/2021    Procedure: CYSTOSCOPY, WITH URETERAL STENT INSERTION;  Surgeon: Damari Barber Jr., MD;  Location: [facility] OR;  Service: Urology;  Laterality: Right;    CYSTOSCOPY W/ URETERAL STENT PLACEMENT Right 5/18/2021    Procedure: CYSTOSCOPY, WITH URETERAL STENT INSERTION;  Surgeon: Damari Barber Jr., MD;  Location: [facility] OR;  Service: Urology;  Laterality: Right;    CYSTOSCOPY W/ URETERAL STENT REMOVAL Right 4/20/2021    Procedure: CYSTOSCOPY, WITH URETERAL STENT REMOVAL;  Surgeon: Damari Barber Jr., MD;  Location: [facility] OR;  Service: Urology;  Laterality: Right;    CYSTOSCOPY WITH URETEROSCOPY, RETROGRADE PYELOGRAPHY, AND INSERTION OF STENT Right 3/30/2021    Procedure: CYSTOSCOPY, WITH RETROGRADE PYELOGRAM AND URETERAL STENT INSERTION;  Surgeon: Damari Barber Jr., MD;  Location: [facility] OR;  Service: Urology;  Laterality: Right;    DIAGNOSTIC LAPAROSCOPY N/A 2/13/2020    Procedure: LAPAROSCOPY, DIAGNOSTIC;  Surgeon: Robbi Lovell III, MD;  Location: [facility] OR;  Service: General;  Laterality: N/A;    HYSTERECTOMY  1980    ILEOSTOMY N/A 2/13/2020    Procedure: CREATION, ILEOSTOMY Loop;  Surgeon: Robbi Lovell III, MD;  Location: [facility] OR;  Service: General;  Laterality: N/A;    ILEOSTOMY CLOSURE N/A 5/18/2020    Procedure: CLOSURE, ILEOSTOMY;  Surgeon: Robbi Lovell III, MD;  Location: [facility] OR;  Service: General;  Laterality: N/A;    LASER LITHOTRIPSY Right 3/30/2021    Procedure: LITHOTRIPSY, USING LASER;  Surgeon: Damari Barber Jr., MD;  Location: [facility] OR;  Service: Urology;   Laterality: Right;    LUMBAR LAMINECTOMY      LYSIS OF ADHESIONS N/A 2020    Procedure: LYSIS, ADHESIONS;  Surgeon: Robbi Lovell III, MD;  Location: [facility] OR;  Service: General;  Laterality: N/A;    NECK SURGERY      PHLEBOGRAPHY  2020    Procedure: VENOGRAM;  Surgeon: Robbi Lovell III, MD;  Location: [facility] OR;  Service: General;;    REMOVAL OF VASCULAR ACCESS PORT Right 2020    Procedure: REMOVAL, VASCULAR ACCESS PORT;  Surgeon: Robbi Lovell III, MD;  Location: [facility] OR;  Service: General;  Laterality: Right;    RETROGRADE PYELOGRAPHY  3/16/2021    Procedure: PYELOGRAM, RETROGRADE;  Surgeon: Damari Barber Jr., MD;  Location: [facility] OR;  Service: Urology;;    RETROGRADE PYELOGRAPHY Right 2021    Procedure: PYELOGRAM, RETROGRADE;  Surgeon: Damari Barber Jr., MD;  Location: [facility];  Service: Urology;  Laterality: Right;    SHOULDER SURGERY      URETEROSCOPIC REMOVAL OF URETERIC CALCULUS Right 3/30/2021    Procedure: REMOVAL, CALCULUS, URETER, URETEROSCOPIC;  Surgeon: Damari Barber Jr., MD;  Location: [facility] OR;  Service: Urology;  Laterality: Right;    URETEROSCOPIC REMOVAL OF URETERIC CALCULUS Right 2021    Procedure: REMOVAL, CALCULUS, URETER, URETEROSCOPIC;  Surgeon: Damari Barber Jr., MD;  Location: [facility];  Service: Urology;  Laterality: Right;    vulva cancer       Family History   Problem Relation Age of Onset    Heart disease Mother     Hypertension Mother     Cancer Mother         lung    Heart disease Father     Heart disease Sister         Open heart surgery    No Known Problems Daughter      Social History     Tobacco Use    Smoking status: Former Smoker     Packs/day: 1.00     Years: 33.00     Pack years: 33.00     Types: Cigarettes     Quit date: 2000     Years since quittin.0    Smokeless tobacco: Former User     Quit date:    Substance Use Topics    Alcohol use: No    Drug use: No       Review of patient's allergies indicates:    Allergen Reactions    Ciprofloxacin Other (See Comments)     Elevated liver enzymes      Pcn [penicillins] Anaphylaxis     TOLERATES ROCEPHIN WITHOUT DIFFICULTY       Outpatient meds:  Current Facility-Administered Medications on File Prior to Encounter   Medication Dose Route Frequency Provider Last Rate Last Admin    electrolyte-S (ISOLYTE)   Intravenous Continuous Raz Ruff MD        lactated ringers infusion   Intravenous Continuous Ruy Beltre MD        LIDOcaine (PF) 10 mg/ml (1%) injection 5 mg  0.5 mL Intradermal Once Raz Ruff MD        [DISCONTINUED] HYDROmorphone (PF) injection 0.2 mg  0.2 mg Intravenous Q5 Min PRN Ruy Beltre MD        [DISCONTINUED] oxyCODONE immediate release tablet 5 mg  5 mg Oral Q3H PRN Ruy Beltre MD   5 mg at 04/20/21 1430     Current Outpatient Medications on File Prior to Encounter   Medication Sig Dispense Refill    CHOLESTYRAMINE LIGHT 4 gram packet Take 1 packet by mouth 2 (two) times daily.      cyclobenzaprine (FLEXERIL) 10 MG tablet Take 1 tablet (10 mg total) by mouth 3 (three) times daily as needed for Muscle spasms. 30 tablet 1    diltiaZEM (CARDIZEM CD) 120 MG Cp24 Take 1 capsule (120 mg total) by mouth once daily. 90 capsule 0    diphenhydrAMINE (BENADRYL) 25 mg capsule Take 25 mg by mouth every 12 (twelve) hours as needed for Itching.      gabapentin (NEURONTIN) 100 MG capsule Take 100 mg by mouth nightly as needed. HCS      levothyroxine (SYNTHROID) 88 MCG tablet Take 1 tablet (88 mcg total) by mouth before breakfast. 90 tablet 0    promethazine (PHENERGAN) 25 MG tablet TAKE 1 TABLET(25 MG) BY MOUTH TWICE DAILY AS NEEDED FOR NAUSEA (Patient taking differently: Take 25 mg by mouth 2 (two) times daily as needed for Nausea.) 60 tablet 0    sertraline (ZOLOFT) 50 MG tablet Take 50 mg by mouth every evening.      doxycycline (VIBRAMYCIN) 100 MG Cap Take 100 mg by mouth every 12 (twelve) hours. HCS      ferric citrate  (AURYXIA) 210 mg iron Tab Take 2 tablets by mouth 3 (three) times daily with meals.      traMADoL (ULTRAM) 50 mg tablet Take 1 tablet (50 mg total) by mouth every 8 (eight) hours as needed for Pain. 40 tablet 0       Scheduled meds:   cholestyramine-aspartame  1 packet Oral BID    [START ON 1/17/2022] diltiaZEM  120 mg Oral Daily    enoxaparin  30 mg Subcutaneous Daily    ferric citrate  2 tablet Oral TID WM    [START ON 1/17/2022] levothyroxine  88 mcg Oral Before breakfast    meropenem (MERREM) IVPB  500 mg Intravenous Q8H    miconazole   Topical (Top) BID    sertraline  50 mg Oral QHS    vancomycin (VANCOCIN) IVPB  1,000 mg Intravenous Once       Infusions:   sodium chloride 0.9%         PRN meds:  cyclobenzaprine, diphenhydrAMINE, gabapentin, HYDROcodone-acetaminophen, morphine, ondansetron, promethazine, sodium chloride 0.9%, Pharmacy to dose Vancomycin consult **AND** vancomycin - pharmacy to dose    Review of Systems:  Review of Systems   Constitutional: Negative for chills, fever, malaise/fatigue and weight loss.   HENT: Negative for hearing loss and nosebleeds.    Eyes: Negative for blurred vision, double vision and photophobia.   Respiratory: Negative for cough, shortness of breath and wheezing.    Cardiovascular: Negative for chest pain, palpitations and leg swelling.   Gastrointestinal: Negative for abdominal pain, constipation, diarrhea, heartburn, nausea and vomiting.   Genitourinary: Negative for dysuria, frequency and urgency.   Musculoskeletal: Negative for falls, joint pain and myalgias.   Skin: Negative for itching and rash.   Neurological: Negative for dizziness, speech change, focal weakness, loss of consciousness and headaches.   Endo/Heme/Allergies: Does not bruise/bleed easily.   Psychiatric/Behavioral: Negative for depression and substance abuse. The patient is not nervous/anxious.      OBJECTIVE:     Vital Signs and IO (Last 24H):  Temp:  [98.5 °F (36.9 °C)-98.9 °F (37.2 °C)]    Pulse:  [68-86]   Resp:  [11-20]   BP: (142-197)/(67-84)   SpO2:  [94 %-99 %]   No intake/output data recorded.    Wt Readings from Last 5 Encounters:   01/16/22 63.6 kg (140 lb 3.4 oz)   09/15/21 63 kg (139 lb)   05/24/21 63 kg (139 lb)   05/19/21 66.7 kg (147 lb 0.8 oz)   05/13/21 63.5 kg (139 lb 15.9 oz)     Physical Exam:  Physical Exam  Constitutional:       Appearance: She is well-developed and well-nourished. She is not diaphoretic.   HENT:      Head: Normocephalic and atraumatic.      Mouth/Throat:      Mouth: Oropharynx is clear and moist.   Eyes:      General: No scleral icterus.     Extraocular Movements: EOM normal.      Pupils: Pupils are equal, round, and reactive to light.   Cardiovascular:      Rate and Rhythm: Normal rate and regular rhythm.   Pulmonary:      Effort: Pulmonary effort is normal. No respiratory distress.      Breath sounds: No stridor.   Abdominal:      General: There is no distension.      Palpations: Abdomen is soft.   Musculoskeletal:         General: No deformity or edema. Normal range of motion.      Cervical back: Neck supple.   Skin:     General: Skin is warm and dry.      Findings: No erythema or rash.   Neurological:      Mental Status: She is alert and oriented to person, place, and time.      Cranial Nerves: No cranial nerve deficit.   Psychiatric:         Mood and Affect: Mood and affect normal.         Behavior: Behavior normal.       Body mass index is 21.64 kg/m².    Laboratory:  Recent Labs   Lab 01/16/22  1115      K 4.0      CO2 23   BUN 29*   CREATININE 5.7*   ESTGFRAFRICA 8.0*   EGFRNONAA 6.9*   GLU 95       Recent Labs   Lab 01/16/22  1115   CALCIUM 8.3*   ALBUMIN 3.1*   MG 1.6       Recent Labs   Lab 09/30/19  0820 11/15/19  0748   PTH, Intact 176.4 H 238.4 H       No results for input(s): POCTGLUCOSE in the last 168 hours.    Recent Labs   Lab 11/15/19  0748 01/22/20  0848 05/25/21  0847   Hemoglobin A1C Invalid A SEE COMMENT 4.6       Recent Labs    Lab 01/16/22  1115   WBC 7.12   HGB 9.4*   HCT 29.7*   *   *   MCHC 31.6*   MONO 5.1  0.4       Recent Labs   Lab 01/16/22  1115   BILITOT 0.8   PROT 6.0   ALBUMIN 3.1*   ALKPHOS 98   ALT 10   AST 12       Recent Labs   Lab 12/09/20  2323 03/15/21  1533 05/17/21  1819   Color, UA Yellow Yellow Yellow   Appearance, UA Cloudy A Cloudy A Hazy A   pH, UA 6.0 7.0 8.0   Specific [place], UA 1.020 1.015 1.015   Protein, UA 1+ A 2+ A 2+ A   Glucose, UA Negative Negative Negative   Ketones, UA Negative Negative Negative   Urobilinogen, UA Negative Negative Negative   Bilirubin (UA) Negative Negative Negative   Occult Blood UA 3+ A 2+ A 2+ A   Nitrite, UA Negative Negative Negative   RBC, UA 1 21 H 7 H   WBC, UA >100 H >100 H 80 H   Bacteria Negative Negative Few A   Hyaline Casts, UA 10 A 40 A 0       Recent Labs   Lab 03/16/20  1551 12/08/20 2024   POC PH 7.402 7.370   POC PCO2 39.9 39.0   POC HCO3 24.9 22.5 L   POC PO2 58 LL 85   POC SATURATED O2 90 L 96   POC BE 0 -3   Sample ARTERIAL ARTERIAL       Microbiology Results (last 7 days)     Procedure Component Value Units Date/Time    Blood culture #2 **CANNOT BE ORDERED STAT** [[id number]] Collected: 01/16/22 1130    Order Status: Sent Specimen: Blood from Peripheral, Antecubital, Right Updated: 01/16/22 1147    Blood culture #1 **CANNOT BE ORDERED STAT** [[id number]] Collected: 01/16/22 1115    Order Status: Sent Specimen: Blood from Peripheral, Antecubital, Right Updated: 01/16/22 1129        ASSESSMENT/PLAN:     ESRD on HD MWF via AVF  Continue current dialysis prescription.  Next HD per schedule.  Renal diet - low K, low phos.  No IVs or BP checks on access and/or non-dominant arm.    Anemia of CKD  Hgb and HCT are acceptable. Monitor.  Will provide ADAM and/or IV iron PRN.    MBD / Secondary HPT  Ca, phos, PTH and vitamin D levels are acceptable.   Phos binders, vitamin D analogues and calcimimetics as needed.    HTN  BP seem controlled.   Tolerate  asymptomatic HTN up to -160.  Continue home meds.  Low sodium diet.    Thank you for allowing us to participate in the care of your patient!   We will follow the patient and provide recommendations as needed.    Patient care time was spent personally by me on the following activities:   · Obtaining a history.  · Examination of patient.  · Providing medical care at the patients bedside.  · Developing a treatment plan with patient or surrogate and bedside caregivers.  · Ordering and reviewing laboratory studies, radiographic studies, pulse oximetry.  · Ordering and performing treatments and interventions.  · Evaluation of patient's response to treatment.  · Discussions with consultants while on the unit and immediately available to the patient.  · Re-evaluation of the patient's condition.  · Documentation in the medical record.     Jorge Webb MD    [facility]  [address] [place] [address]  [place] [address]    [phone] - tel  [phone] - fax    1/16/2022

## 2022-01-16 NOTE — Clinical Note
80 ml of contrast were injected throughout the case. 70 mL of contrast was the total wasted during the case. 150 mL was the total amount used during the case.

## 2022-01-17 PROBLEM — N18.6 ESRD ON DIALYSIS: Status: ACTIVE | Noted: 2021-03-15

## 2022-01-17 PROBLEM — Z99.2 ESRD ON DIALYSIS: Status: ACTIVE | Noted: 2021-03-15

## 2022-01-17 PROBLEM — Z99.2 ESRD ON DIALYSIS: Chronic | Status: ACTIVE | Noted: 2021-03-15

## 2022-01-17 PROBLEM — D69.6 THROMBOCYTOPENIA: Status: ACTIVE | Noted: 2022-01-01

## 2022-01-17 NOTE — CONSULTS
Interprofessional Telephone Consult/Progress Note  Date of Service: 01/17/2022  Patient's location: [facility]   Consulting physician: MD David      Specialty Consulted: Urology  Staff Consulted: Dr.Jennifer Garcia  Reason for Consult: ureteral stricture, indwelling stent     Time spent reviewing records, making plan and formulating plan: 35 min. More than half the time was devoted to medical consultative verbal/internet discussion. >50% in discussion with provider about recommendations, provided verbally and in wiriting, and pt gave verbal consent to discuss care with subsepcialists    Patient has given consent to discuss care with sub-specialist. This service was not originating from a related E/M service provided within the previous 7 days nor will  to an E/M service or procedure within the next 24 hours or my soonest available appointment.      Both a written plan and a verbal/internet discussion were discussed with the consulting physician  09834 5-10 min  20951 11 to 20 min  31135 21 to 34 min  [id number] 35 min    [facility] - [facility]  Staff: MD Jose  Group:Jose/Didi/Sunil/Terri/Jennifer  Referring provider and please cc: Yamile  PCP: April Horton MD    Subjective:      Initial consult by me in hospital on 1/17/22:        Patient with a history of HTN, vulvar cancer s/p chemo/XRT in 2013 and CKD who presented to the [facility] emergency department on 2/23/20 with abdominal pain, vaginal bleeding, DARCI  (3.8 from 2.5 baseline) and a 20 pound weight loss. Godwin was placed on admission to monitor UOP. CTRSS 2/12 2/23 showed bilateral nonobstructing renal stones.   · She underwent loop ileostomy on 2/13/20 for chronic diarrhea. Renal ultrasound on 2/26 showed no hydro, b renal stones and indeterminate artifact.   · She underwent ileostomy reversal on 5/18/20. Discharged from the hospital on 5/23/20.  · 6/16/20: Patient presented today [facility] ED on 6/14/20 with worsening  "gross hematuria. Urology consulted to assist with management. States she became light-headed and dizzy at home due to anemia.   · 6/25/20: She is s/p cystoscopy with fulguration on 6/16/20. Placed on CBI overnight for monitoring. Urine remained clear and patient discharged from [facility]. She presents today with her lee in place and clear urine.Here for voiding trial.   · 3/16/21: Patient presented to [facility] ED on 3/15/21 complaining of a 2 days history of right flank pain associated with chills and nausea. CT abdomen/pelvis without contrast revealed several distal right UVJ stones - largest measuring 8 mm with associated moderate right hydroureteronephrosis. Also with bilateral non-obstructing nephrolithiasis. Urine culture with gram negative rods.   · 4/20/21: She is s/p right ureteroscopy with laser lithotripsy and stent exchange on 3/30/21. Several impacted stones identified and lasered. Second look-ureteroscopy today.   · 5/13/21: She underwent second look ureteroscopy on 4/20/21. Stent removed in clinic today at bedside. Patient with chronic diarrhea. Stones are calcium oxalate monohydrate.   · 5/18/21: Patient presented to the ED yesterday with fever, chills and recurrent right flank pain after stent removal. CT renal stone with recurrent hydroureteronephrosis.  placed a stent and removed a few bladder stones. "Disposition:  Transfer back to the floor. The patient will follow up with me in 3 weeks to discuss further management. Has continued to get pyelo when stent removed. Stricture complicating full visualization of her ureter."    Interval history 1/17/22:  She is now on HD M, W,F for ESRD for the last year. She came to ER yesterday with recurrent vulvar cellultis and diarrhea associated with low grade fever. A CTAP in the ER showed moderate R hydro with stent with the proximal curl in the distal renal pelvis. 1 stone in R kidney, multiple stones in L. No urine sample available.     She states she didn't " know she had a stent and had to f/u    She still urinates but is incontinent due to how swollen her labia are. She hasn't had a catheter placed in a while but tends to be difficult and painful due to   She denies any flank pain or recent uti's.     Urine history:  5/17/21  3+leuk/2+bld, 7 rbc/80 wbc/occ wbc clumps/few bact/1 squame  Multi orgs                    4/13/21  GNRs                          3/15/21  E. Coli                          12/8/20  Multiple organisms      12/9/20  No growth                    6/16/20  Enterobacter               6/14/20  E. Coli                          6/3/20  Enterococcus              5/21/20  Pseudomonas             3/24/20                   Current REVIEW OF SYSTEMS:  Negative for the remaining 12 points of ROS except for as stated above       PMHx:  Past Medical History:   Diagnosis Date    Cellulitis of trunk     Perineum, numerous episodes    Chronic diarrhea 02/10/2020    Short-gut syndrome    Diabetes mellitus, type 2     pt has not had for years after losing weight    End stage renal disease on dialysis 01/07/2018    Fatigue     Hypertension     Hypothyroidism 2/10/2020    Iron deficiency anemia due to chronic blood loss 1/7/2018    Pulmonary hypertension 3/20/2020    Vulvar cancer 1/7/2019       PSHx:  Past Surgical History:   Procedure Laterality Date    ABDOMINAL SURGERY      APPENDECTOMY      AV FISTULA PLACEMENT Left 2018    BACK SURGERY      BLADDER FULGURATION N/A 6/16/2020    Procedure: FULGURATION, BLADDER;  Surgeon: Damari Barber Jr., MD;  Location: [facility];  Service: Urology;  Laterality: N/A;    carpel tunnel surgery once on each hand      CERVICAL FUSION      x 4    cervix repair      CHOLECYSTECTOMY  2000    COLECTOMY      CYSTOSCOPY W/ RETROGRADES  6/16/2020    Procedure: CYSTOSCOPY, WITH RETROGRADE PYELOGRAM;  Surgeon: Damari Barber Jr., MD;  Location: [facility];  Service: Urology;;    CYSTOSCOPY W/ RETROGRADES Right 4/20/2021     Procedure: CYSTOSCOPY, WITH RETROGRADE PYELOGRAM;  Surgeon: Damari Barber Jr., MD;  Location: [facility] OR;  Service: Urology;  Laterality: Right;    CYSTOSCOPY W/ URETERAL STENT PLACEMENT Right 3/16/2021    Procedure: CYSTOSCOPY, WITH URETERAL STENT INSERTION;  Surgeon: Damari Barber Jr., MD;  Location: [facility] OR;  Service: Urology;  Laterality: Right;    CYSTOSCOPY W/ URETERAL STENT PLACEMENT Right 5/18/2021    Procedure: CYSTOSCOPY, WITH URETERAL STENT INSERTION;  Surgeon: Damari Barber Jr., MD;  Location: [facility] OR;  Service: Urology;  Laterality: Right;    CYSTOSCOPY W/ URETERAL STENT REMOVAL Right 4/20/2021    Procedure: CYSTOSCOPY, WITH URETERAL STENT REMOVAL;  Surgeon: Damari Barber Jr., MD;  Location: [facility] OR;  Service: Urology;  Laterality: Right;    CYSTOSCOPY WITH URETEROSCOPY, RETROGRADE PYELOGRAPHY, AND INSERTION OF STENT Right 3/30/2021    Procedure: CYSTOSCOPY, WITH RETROGRADE PYELOGRAM AND URETERAL STENT INSERTION;  Surgeon: Damari Barber Jr., MD;  Location: [facility] OR;  Service: Urology;  Laterality: Right;    DIAGNOSTIC LAPAROSCOPY N/A 2/13/2020    Procedure: LAPAROSCOPY, DIAGNOSTIC;  Surgeon: Robib Lovell III, MD;  Location: [facility] OR;  Service: General;  Laterality: N/A;    HYSTERECTOMY  1980    ILEOSTOMY N/A 2/13/2020    Procedure: CREATION, ILEOSTOMY Loop;  Surgeon: Robbi Lovell III, MD;  Location: [facility] OR;  Service: General;  Laterality: N/A;    ILEOSTOMY CLOSURE N/A 5/18/2020    Procedure: CLOSURE, ILEOSTOMY;  Surgeon: Robbi Lovell III, MD;  Location: [facility] OR;  Service: General;  Laterality: N/A;    LASER LITHOTRIPSY Right 3/30/2021    Procedure: LITHOTRIPSY, USING LASER;  Surgeon: Damari Barber Jr., MD;  Location: [facility] OR;  Service: Urology;  Laterality: Right;    LUMBAR LAMINECTOMY      LYSIS OF ADHESIONS N/A 2/13/2020    Procedure: LYSIS, ADHESIONS;  Surgeon: Robbi Lovell III, MD;  Location: [facility] OR;  Service: General;  Laterality: N/A;    NECK SURGERY       PHLEBOGRAPHY  2020    Procedure: VENOGRAM;  Surgeon: Robbi Lovell III, MD;  Location: [facility] OR;  Service: General;;    REMOVAL OF VASCULAR ACCESS PORT Right 2020    Procedure: REMOVAL, VASCULAR ACCESS PORT;  Surgeon: Robbi Lovell III, MD;  Location: [facility] OR;  Service: General;  Laterality: Right;    RETROGRADE PYELOGRAPHY  3/16/2021    Procedure: PYELOGRAM, RETROGRADE;  Surgeon: Damari Barber Jr., MD;  Location: [facility] OR;  Service: Urology;;    RETROGRADE PYELOGRAPHY Right 2021    Procedure: PYELOGRAM, RETROGRADE;  Surgeon: Damari Barber Jr., MD;  Location: [facility] OR;  Service: Urology;  Laterality: Right;    SHOULDER SURGERY      URETEROSCOPIC REMOVAL OF URETERIC CALCULUS Right 3/30/2021    Procedure: REMOVAL, CALCULUS, URETER, URETEROSCOPIC;  Surgeon: Damari Barber Jr., MD;  Location: [facility] OR;  Service: Urology;  Laterality: Right;    URETEROSCOPIC REMOVAL OF URETERIC CALCULUS Right 2021    Procedure: REMOVAL, CALCULUS, URETER, URETEROSCOPIC;  Surgeon: Damari Barber Jr., MD;  Location: [facility] OR;  Service: Urology;  Laterality: Right;    vulva cancer         Fam Hx:  Reviewed- pertinent family hx as above    Soc Hx:  Social History     Tobacco Use    Smoking status: Former Smoker     Packs/day: 1.00     Years: 33.00     Pack years: 33.00     Types: Cigarettes     Quit date: 2000     Years since quittin.0    Smokeless tobacco: Former User     Quit date:    Substance Use Topics    Alcohol use: No    Drug use: No       Allergies:  Ciprofloxacin and Pcn [penicillins]    Anticoagulation/Aspirin:     Objective:     Vitals:    22 1522   BP: (!) 169/68   Pulse: 74   Resp: 19   Temp: 99.1 °F (37.3 °C)         Pertinent  exam in HPI        Pertinent urologic PATHOLOGY AND REVIEW:  See hpi for recent      Assessment:     Shara Hutchins is a 71 y.o. female with   1. Cellulitis, unspecified cellulitis site    2. Vulvar cellulitis    3. Lymphedema due to radiation     4. Chronic diarrhea    5. Urinary incontinence, unspecified type    6. S/P ureteral stent placement    7. ESRD on dialysis        urology consulted for:  Pt has a right ureteral stent in placed on 5/2021 by  after a stone procedure and was supposed to f/u 3 months later for a plan. He was going to keep it and exchange it bc she continues to get recurrent pyelonephritis and infections. If she is able to have done tomorrow he will plan to do it at [facility]. However if she cannot have done tomorrow or is having her dialysis cath done he will schedule ouptatient. She may end up needing shock wave treatment of the proximal curl if it cannot be removed. Would also prefer a cath urine if possible (while she is getting dialysis cath)? Tends to be painful due to significant swelling of vulva    Plan:     · Possible cysto stent exchange tomorrow by  at [facility] if able to have done. If not will need to be done oupatient soon.  · Obtain a cath urine in the or while pt is having her dialysis cath placed.  · Npo p mn  · Continue rocephin for now      Gaby Garcia MD

## 2022-01-17 NOTE — CONSULTS
"* Note: Assessment completed remotely by review of EMR and with assistance by registered dietitians and other healthcare team members on premises as needed.*     [facility]  Adult Nutrition   Consult Note (Initial Assessment)     SUMMARY     Recommendations/Interventions:    Recommendation/Intervention: 1. Continue current diet as tolerated, encourage intake. 2.  to assist in meal choices daily.  Goals: 1. Patient to meet at least 75% of estimated needs through meal intake.  Nutrition Goal Status: new    Dietitian Rounds Brief:  · Consult for nutrition assessment. Patient admitted 2' obstructive right sided nephrolithiasis. Hx of severe malnutrition-weight has been stable since diagnosis. Intake not documented, however per records patient intake varies depending on what food is served. Will continue to monitor intake, labs, and plan of care.  Reason for Assessment  Reason For Assessment: consult  Diagnosis: renal disease  Relevant Medical History: IBD, hx of vulvuar cancer, ESRD-on HD, HTN, vitamin D deficiency, B12 deficiency, severe malnutrition  Interdisciplinary Rounds: did not attend    Nutrition Risk Screen  Nutrition Risk Screen: no indicators present     MST Score: 0  Have you recently lost weight without trying?: No  Weight loss score: 0  Have you been eating poorly because of a decreased appetite?: No  Appetite score: 0     Nutrition/Diet History  Food Allergies: NKFA  Factors Affecting Nutritional Intake: None identified at this time    Anthropometrics  Temp: 98.2 °F (36.8 °C)  Height Method: Stated  Height: 5' 7.5" (171.5 cm)  Height (inches): 67.5 in  Weight Method: Standard Scale  Weight: 63.6 kg (140 lb 3.4 oz)  Weight (lb): 140.21 lb  Ideal Body Weight (IBW), Female: 137.5 lb  % Ideal Body Weight, Female (lb): 101.97 %  BMI (Calculated): 21.6  BMI Grade: 18.5-24.9 - normal     Weight History:  Wt Readings from Last 10 Encounters:   01/16/22 63.6 kg (140 lb 3.4 oz)   09/15/21 63 " kg (139 lb)   05/24/21 63 kg (139 lb)   05/19/21 66.7 kg (147 lb 0.8 oz)   05/13/21 63.5 kg (139 lb 15.9 oz)   04/20/21 63.5 kg (140 lb)   03/30/21 59.9 kg (132 lb)   03/15/21 62 kg (136 lb 11 oz)   02/04/21 62.6 kg (138 lb)   02/01/21 61.2 kg (135 lb)     Lab/Procedures/Meds: Pertinent Labs Reviewed  Clinical Chemistry:  Recent Labs   Lab 01/16/22  1115 01/17/22  0438    136   K 4.0 4.7    104   CO2 23 21*   GLU 95 67*   BUN 29* 32*   CREATININE 5.7* 6.1*   CALCIUM 8.3* 8.0*   PROT 6.0  --    ALBUMIN 3.1*  --    BILITOT 0.8  --    ALKPHOS 98  --    AST 12  --    ALT 10  --    ANIONGAP 14 11   ESTGFRAFRICA 8.0* 7.4*   EGFRNONAA 6.9* 6.4*   MG 1.6 1.5*   PHOS  --  6.4*    < > = values in this interval not displayed.     CBC:   Recent Labs   Lab 01/17/22  0438   WBC 7.64   RBC 2.57*   HGB 9.6*   HCT 30.9*   *   *   MCH 37.4*   MCHC 31.1*     Medications: Pertinent Medications reviewed  Scheduled Meds:   sodium chloride 0.9%   Intravenous Once    cholestyramine-aspartame  1 packet Oral BID    diltiaZEM  120 mg Oral Daily    enoxaparin  30 mg Subcutaneous Daily    ferric citrate  2 tablet Oral TID WM    fluconazole (DIFLUCAN) IVPB  100 mg Intravenous Q24H    levothyroxine  88 mcg Oral Before breakfast    meropenem (MERREM) IVPB  500 mg Intravenous Q24H    miconazole   Topical (Top) BID    mupirocin   Nasal BID    sertraline  50 mg Oral QHS     Continuous Infusions:  PRN Meds:.cyclobenzaprine, diphenhydrAMINE, gabapentin, HYDROcodone-acetaminophen, morphine, ondansetron, promethazine, sodium chloride 0.9%, sodium chloride 0.9%, vancomycin - pharmacy to dose    Antibiotics (From admission, onward)            Start     Stop Route Frequency Ordered    01/16/22 2100  mupirocin 2 % ointment         01/21 2059 Nasl 2 times daily 01/16/22 1629 01/16/22 1717  vancomycin - pharmacy to dose         -- IV pharmacy to manage frequency 01/16/22 1618    01/16/22 1700  meropenem-0.9% sodium  chloride 500 mg/50 mL IVPB        Note to Pharmacy: CrCl- 9 ml/min    -- IV Every 24 hours (non-standard times) 01/16/22 1605           Estimated/Assessed Needs  Weight Used For Calorie Calculations: 63.6 kg (140 lb 3.4 oz)  Energy Calorie Requirements (kcal): 1395-9536 kcals/day (20-25 kcals/kg)  Energy Need Method: Kcal/kg  Protein Requirements: 76-97 g/day (1.2-1.5 g/kg)  Weight Used For Protein Calculations: 63.6 kg (140 lb 3.4 oz)  Fluid Requirements (mL): 1 mL/kcal or per MD     RDA Method (mL): 1272       Nutrition Prescription Ordered    Current Diet Order: Renal Diet    Evaluation of Received Nutrient/Fluid Intake    Energy Calories Required: not meeting needs  Protein Required: not meeting needs  Fluid Required: meeting needs  Tolerance: tolerating  % Intake of Estimated Energy Needs: Other could not assess  % Meal Intake: Other: could not assess    Intake/Output Summary (Last 24 hours) at 1/17/2022 0816  Last data filed at 1/16/2022 1522  Gross per 24 hour   Intake 50 ml   Output --   Net 50 ml      Nutrition Risk    Level of Risk/Frequency of Follow-up: moderate - high   Monitor and Evaluation    Food and Nutrient Intake: energy intake,food and beverage intake  Food and Nutrient Adminstration: diet order  Physical Activity and Function: nutrition-related ADLs and IADLs,factors affecting access to physical activity  Anthropometric Measurements: weight,weight change,body mass index  Biochemical Data, Medical Tests and Procedures: electrolyte and renal panel,gastrointestinal profile,glucose/endocrine profile,inflammatory profile,lipid profile  Nutrition-Focused Physical Findings: overall appearance     Nutrition Follow-Up    RD Follow-up?: Yes  Kitty Blanco RD 01/17/2022 11:35 AM

## 2022-01-17 NOTE — CONSULTS
Nephrology Consult Note        Patient Name: Shara Hutchins  MRN: [id number]    Patient Class: IP- Inpatient   Admission Date: 1/16/2022  Length of Stay: 1 days  Date of Service: 1/17/2022    Attending Physician: Dana Ovalle MD  Primary Care Provider: April Horton MD    Reason for Consult: esrd/anemia/htn/shpt/vulvar cellulitis    SUBJECTIVE:     HPI: ESRD patient on HD MWF presents to the emergency department with recurrent vulvar cellulitis, complicated by chronic diarrhea since being treated with radiation. Unrelieved by any home analgesics or topical medications. Associated with intermittent low grade fever, occasional bleeding from excoriations of the skin, and hardened swelling of the affected area. She has intermittent nausea associated with the severe pain.     Imaging obtained in the emergency department incidentally revealed an obstructive, right-sided nephrolithiasis by 6 mm kidney stone causing perinephric fat stranding/hydronephrosis. The patient denied flank pain, hematuria, increased urination or urgency.    1/17  AVF non-functioning.  Consult Vascular surgery for trialysis catheter and avf evaluation    Past Medical History:   Diagnosis Date    Cellulitis of trunk     Perineum, numerous episodes    Chronic diarrhea 02/10/2020    Short-gut syndrome    Diabetes mellitus, type 2     pt has not had for years after losing weight    End stage renal disease on dialysis 01/07/2018    Fatigue     Hypertension     Hypothyroidism 2/10/2020    Iron deficiency anemia due to chronic blood loss 1/7/2018    Pulmonary hypertension 3/20/2020    Vulvar cancer 1/7/2019     Past Surgical History:   Procedure Laterality Date    ABDOMINAL SURGERY      APPENDECTOMY      AV FISTULA PLACEMENT Left 2018    BACK SURGERY      BLADDER FULGURATION N/A 6/16/2020    Procedure: FULGURATION, BLADDER;  Surgeon: Damari Barber Jr., MD;  Location: [facility];  Service: Urology;  Laterality: N/A;    carpel tunnel surgery  once on each hand      CERVICAL FUSION      x 4    cervix repair      CHOLECYSTECTOMY  2000    COLECTOMY      CYSTOSCOPY W/ RETROGRADES  6/16/2020    Procedure: CYSTOSCOPY, WITH RETROGRADE PYELOGRAM;  Surgeon: Damari Barber Jr., MD;  Location: [facility] OR;  Service: Urology;;    CYSTOSCOPY W/ RETROGRADES Right 4/20/2021    Procedure: CYSTOSCOPY, WITH RETROGRADE PYELOGRAM;  Surgeon: Damari Barber Jr., MD;  Location: [facility] OR;  Service: Urology;  Laterality: Right;    CYSTOSCOPY W/ URETERAL STENT PLACEMENT Right 3/16/2021    Procedure: CYSTOSCOPY, WITH URETERAL STENT INSERTION;  Surgeon: Damari Barber Jr., MD;  Location: [facility] OR;  Service: Urology;  Laterality: Right;    CYSTOSCOPY W/ URETERAL STENT PLACEMENT Right 5/18/2021    Procedure: CYSTOSCOPY, WITH URETERAL STENT INSERTION;  Surgeon: Damari Barber Jr., MD;  Location: [facility] OR;  Service: Urology;  Laterality: Right;    CYSTOSCOPY W/ URETERAL STENT REMOVAL Right 4/20/2021    Procedure: CYSTOSCOPY, WITH URETERAL STENT REMOVAL;  Surgeon: Damari Barber Jr., MD;  Location: [facility] OR;  Service: Urology;  Laterality: Right;    CYSTOSCOPY WITH URETEROSCOPY, RETROGRADE PYELOGRAPHY, AND INSERTION OF STENT Right 3/30/2021    Procedure: CYSTOSCOPY, WITH RETROGRADE PYELOGRAM AND URETERAL STENT INSERTION;  Surgeon: Damari Barber Jr., MD;  Location: [facility] OR;  Service: Urology;  Laterality: Right;    DIAGNOSTIC LAPAROSCOPY N/A 2/13/2020    Procedure: LAPAROSCOPY, DIAGNOSTIC;  Surgeon: Robbi Lovell III, MD;  Location: [facility] OR;  Service: General;  Laterality: N/A;    HYSTERECTOMY  1980    ILEOSTOMY N/A 2/13/2020    Procedure: CREATION, ILEOSTOMY Loop;  Surgeon: Robbi Lovell III, MD;  Location: [facility] OR;  Service: General;  Laterality: N/A;    ILEOSTOMY CLOSURE N/A 5/18/2020    Procedure: CLOSURE, ILEOSTOMY;  Surgeon: Robbi Lovell III, MD;  Location: [facility] OR;  Service: General;  Laterality: N/A;    LASER LITHOTRIPSY Right 3/30/2021    Procedure:  LITHOTRIPSY, USING LASER;  Surgeon: Damari Barber Jr., MD;  Location: [facility] OR;  Service: Urology;  Laterality: Right;    LUMBAR LAMINECTOMY      LYSIS OF ADHESIONS N/A 2020    Procedure: LYSIS, ADHESIONS;  Surgeon: Robbi Lovell III, MD;  Location: [facility] OR;  Service: General;  Laterality: N/A;    NECK SURGERY      PHLEBOGRAPHY  2020    Procedure: VENOGRAM;  Surgeon: Robbi Lovell III, MD;  Location: [facility] OR;  Service: General;;    REMOVAL OF VASCULAR ACCESS PORT Right 2020    Procedure: REMOVAL, VASCULAR ACCESS PORT;  Surgeon: Robbi Lovell III, MD;  Location: [facility] OR;  Service: General;  Laterality: Right;    RETROGRADE PYELOGRAPHY  3/16/2021    Procedure: PYELOGRAM, RETROGRADE;  Surgeon: Damari Barber Jr., MD;  Location: [facility] OR;  Service: Urology;;    RETROGRADE PYELOGRAPHY Right 2021    Procedure: PYELOGRAM, RETROGRADE;  Surgeon: Damari Barber Jr., MD;  Location: [facility] OR;  Service: Urology;  Laterality: Right;    SHOULDER SURGERY      URETEROSCOPIC REMOVAL OF URETERIC CALCULUS Right 3/30/2021    Procedure: REMOVAL, CALCULUS, URETER, URETEROSCOPIC;  Surgeon: Damari Barber Jr., MD;  Location: [facility] OR;  Service: Urology;  Laterality: Right;    URETEROSCOPIC REMOVAL OF URETERIC CALCULUS Right 2021    Procedure: REMOVAL, CALCULUS, URETER, URETEROSCOPIC;  Surgeon: Damari Barber Jr., MD;  Location: [facility];  Service: Urology;  Laterality: Right;    vulva cancer       Family History   Problem Relation Age of Onset    Heart disease Mother     Hypertension Mother     Cancer Mother         lung    Heart disease Father     Heart disease Sister         Open heart surgery    No Known Problems Daughter      Social History     Tobacco Use    Smoking status: Former Smoker     Packs/day: 1.00     Years: 33.00     Pack years: 33.00     Types: Cigarettes     Quit date: 2000     Years since quittin.0    Smokeless tobacco: Former User     Quit date:     Substance Use Topics    Alcohol use: No    Drug use: No       Review of patient's allergies indicates:   Allergen Reactions    Ciprofloxacin Other (See Comments)     Elevated liver enzymes      Pcn [penicillins] Anaphylaxis     TOLERATES ROCEPHIN WITHOUT DIFFICULTY       Outpatient meds:  Current Facility-Administered Medications on File Prior to Encounter   Medication Dose Route Frequency Provider Last Rate Last Admin    electrolyte-S (ISOLYTE)   Intravenous Continuous Raz Ruff MD        lactated ringers infusion   Intravenous Continuous Ruy Beltre MD        LIDOcaine (PF) 10 mg/ml (1%) injection 5 mg  0.5 mL Intradermal Once Raz Ruff MD        [DISCONTINUED] HYDROmorphone (PF) injection 0.2 mg  0.2 mg Intravenous Q5 Min PRN Ruy Beltre MD        [DISCONTINUED] oxyCODONE immediate release tablet 5 mg  5 mg Oral Q3H PRN Ruy Beltre MD   5 mg at 04/20/21 1430     Current Outpatient Medications on File Prior to Encounter   Medication Sig Dispense Refill    CHOLESTYRAMINE LIGHT 4 gram packet Take 1 packet by mouth 2 (two) times daily.      cyclobenzaprine (FLEXERIL) 10 MG tablet Take 1 tablet (10 mg total) by mouth 3 (three) times daily as needed for Muscle spasms. 30 tablet 1    diltiaZEM (CARDIZEM CD) 120 MG Cp24 Take 1 capsule (120 mg total) by mouth once daily. 90 capsule 0    diphenhydrAMINE (BENADRYL) 25 mg capsule Take 25 mg by mouth every 12 (twelve) hours as needed for Itching.      gabapentin (NEURONTIN) 100 MG capsule Take 100 mg by mouth nightly as needed. HCS      levothyroxine (SYNTHROID) 88 MCG tablet Take 1 tablet (88 mcg total) by mouth before breakfast. 90 tablet 0    promethazine (PHENERGAN) 25 MG tablet TAKE 1 TABLET(25 MG) BY MOUTH TWICE DAILY AS NEEDED FOR NAUSEA (Patient taking differently: Take 25 mg by mouth 2 (two) times daily as needed for Nausea.) 60 tablet 0    sertraline (ZOLOFT) 50 MG tablet Take 50 mg by mouth every evening.       doxycycline (VIBRAMYCIN) 100 MG Cap Take 100 mg by mouth every 12 (twelve) hours. HCS      ferric citrate (AURYXIA) 210 mg iron Tab Take 2 tablets by mouth 3 (three) times daily with meals.      traMADoL (ULTRAM) 50 mg tablet Take 1 tablet (50 mg total) by mouth every 8 (eight) hours as needed for Pain. 40 tablet 0       Scheduled meds:   sodium chloride 0.9%   Intravenous Once    cholestyramine-aspartame  1 packet Oral BID    diltiaZEM  120 mg Oral Daily    enoxaparin  30 mg Subcutaneous Daily    ferric citrate  2 tablet Oral TID WM    fluconazole (DIFLUCAN) IVPB  100 mg Intravenous Q24H    levothyroxine  88 mcg Oral Before breakfast    meropenem (MERREM) IVPB  500 mg Intravenous Q24H    miconazole   Topical (Top) BID    mupirocin   Nasal BID    sertraline  50 mg Oral QHS       Infusions:      PRN meds:  cyclobenzaprine, diphenhydrAMINE, gabapentin, HYDROcodone-acetaminophen, morphine, ondansetron, promethazine, sodium chloride 0.9%, sodium chloride 0.9%, vancomycin - pharmacy to dose    Review of Systems:  Review of Systems   Constitutional: Negative for chills, fever, malaise/fatigue and weight loss.   HENT: Negative for hearing loss and nosebleeds.    Eyes: Negative for blurred vision, double vision and photophobia.   Respiratory: Negative for cough, shortness of breath and wheezing.    Cardiovascular: Negative for chest pain, palpitations and leg swelling.   Gastrointestinal: Negative for abdominal pain, constipation, diarrhea, heartburn, nausea and vomiting.   Genitourinary: Negative for dysuria, frequency and urgency.   Musculoskeletal: Negative for falls, joint pain and myalgias.   Skin: Negative for itching and rash.   Neurological: Negative for dizziness, speech change, focal weakness, loss of consciousness and headaches.   Endo/Heme/Allergies: Does not bruise/bleed easily.   Psychiatric/Behavioral: Negative for depression and substance abuse. The patient is not nervous/anxious.       OBJECTIVE:     Vital Signs and IO (Last 24H):  Temp:  [98 °F (36.7 °C)-98.9 °F (37.2 °C)]   Pulse:  [65-78]   Resp:  [11-20]   BP: (138-182)/(67-81)   SpO2:  [95 %-99 %]   I/O last 3 completed shifts:  In: 50 [IV Piggyback:50]  Out: -     Wt Readings from Last 5 Encounters:   01/16/22 63.6 kg (140 lb 3.4 oz)   09/15/21 63 kg (139 lb)   05/24/21 63 kg (139 lb)   05/19/21 66.7 kg (147 lb 0.8 oz)   05/13/21 63.5 kg (139 lb 15.9 oz)     Physical Exam:  Physical Exam  Constitutional:       Appearance: She is well-developed. She is not diaphoretic.   HENT:      Head: Normocephalic and atraumatic.   Eyes:      General: No scleral icterus.     Pupils: Pupils are equal, round, and reactive to light.   Cardiovascular:      Rate and Rhythm: Normal rate and regular rhythm.   Pulmonary:      Effort: Pulmonary effort is normal. No respiratory distress.      Breath sounds: No stridor.   Abdominal:      General: There is no distension.      Palpations: Abdomen is soft.   Musculoskeletal:         General: No deformity. Normal range of motion.      Cervical back: Neck supple.   Skin:     General: Skin is warm and dry.      Findings: No erythema or rash.   Neurological:      Mental Status: She is alert and oriented to person, place, and time.      Cranial Nerves: No cranial nerve deficit.   Psychiatric:         Behavior: Behavior normal.       Body mass index is 21.64 kg/m².    Laboratory:  Recent Labs   Lab 01/16/22  1115 01/17/22  0438    136   K 4.0 4.7    104   CO2 23 21*   BUN 29* 32*   CREATININE 5.7* 6.1*   ESTGFRAFRICA 8.0* 7.4*   EGFRNONAA 6.9* 6.4*   GLU 95 67*       Recent Labs   Lab 01/16/22  1115 01/17/22  0438   CALCIUM 8.3* 8.0*   ALBUMIN 3.1*  --    PHOS  --  6.4*   MG 1.6 1.5*       Recent Labs   Lab 09/30/19  0820 11/15/19  0748   PTH, Intact 176.4 H 238.4 H       No results for input(s): POCTGLUCOSE in the last 168 hours.    Recent Labs   Lab 11/15/19  0748 01/22/20  0848 05/25/21  0847   Hemoglobin  A1C Invalid A SEE COMMENT 4.6       Recent Labs   Lab 01/16/22  1115 01/17/22  0438   WBC 7.12 7.64   HGB 9.4* 9.6*   HCT 29.7* 30.9*   * 145*   * 120*   MCHC 31.6* 31.1*   MONO 5.1  0.4  --        Recent Labs   Lab 01/16/22  1115   BILITOT 0.8   PROT 6.0   ALBUMIN 3.1*   ALKPHOS 98   ALT 10   AST 12       Recent Labs   Lab 12/09/20  2323 03/15/21  1533 05/17/21  1819   Color, UA Yellow Yellow Yellow   Appearance, UA Cloudy A Cloudy A Hazy A   pH, UA 6.0 7.0 8.0   Specific [place], UA 1.020 1.015 1.015   Protein, UA 1+ A 2+ A 2+ A   Glucose, UA Negative Negative Negative   Ketones, UA Negative Negative Negative   Urobilinogen, UA Negative Negative Negative   Bilirubin (UA) Negative Negative Negative   Occult Blood UA 3+ A 2+ A 2+ A   Nitrite, UA Negative Negative Negative   RBC, UA 1 21 H 7 H   WBC, UA >100 H >100 H 80 H   Bacteria Negative Negative Few A   Hyaline Casts, UA 10 A 40 A 0       Recent Labs   Lab 03/16/20  1551 12/08/20 2024   POC PH 7.402 7.370   POC PCO2 39.9 39.0   POC HCO3 24.9 22.5 L   POC PO2 58 LL 85   POC SATURATED O2 90 L 96   POC BE 0 -3   Sample ARTERIAL ARTERIAL       Microbiology Results (last 7 days)     Procedure Component Value Units Date/Time    Blood culture #2 **CANNOT BE ORDERED STAT** [[id number]] Collected: 01/16/22 1130    Order Status: Completed Specimen: Blood from Peripheral, Antecubital, Right Updated: 01/16/22 1917     Blood Culture, Routine No Growth to date    Blood culture #1 **CANNOT BE ORDERED STAT** [[id number]] Collected: 01/16/22 1115    Order Status: Completed Specimen: Blood from Peripheral, Antecubital, Right Updated: 01/16/22 1758     Blood Culture, Routine No Growth to date        ASSESSMENT/PLAN:     ESRD on HD MWF via AVF  Continue current dialysis prescription.  Next HD per schedule.  Renal diet - low K, low phos.  No IVs or BP checks on access and/or non-dominant arm.    Anemia of CKD  Hgb and HCT are acceptable. Monitor.  Will provide ADAM  and/or IV iron PRN.    MBD / Secondary HPT  Ca, phos, PTH and vitamin D levels are acceptable.   Phos binders, vitamin D analogues and calcimimetics as needed.    HTN  BP seem controlled.   Tolerate asymptomatic HTN up to -160.  Continue home meds.  Low sodium diet.    Thank you for allowing us to participate in the care of your patient!   We will follow the patient and provide recommendations as needed.    Patient care time was spent personally by me on the following activities:   · Obtaining a history.  · Examination of patient.  · Providing medical care at the patients bedside.  · Developing a treatment plan with patient or surrogate and bedside caregivers.  · Ordering and reviewing laboratory studies, radiographic studies, pulse oximetry.  · Ordering and performing treatments and interventions.  · Evaluation of patient's response to treatment.  · Discussions with consultants while on the unit and immediately available to the patient.  · Re-evaluation of the patient's condition.  · Documentation in the medical record.     Raúl Dietz MD    [facility]  [address] [place] [address]  [place], [place] [address] [phone] - tel  [phone] - fax    1/17/2022

## 2022-01-17 NOTE — Clinical Note
A pulse oximeter was placed on the patient's right index finger. 'I vomited 3 times this afternoon'.

## 2022-01-17 NOTE — SUBJECTIVE & OBJECTIVE
Interval History:  Patient reports severe persistent pain to perineal and sacral region, POA, IV morphine is helpful.  Chronic nausea and chronic diarrhea, denies any recent change.  Denies any shortness of breath.  States/radiation therapy was 2013. States she is feeling cold.  Telemetry sinus rhythm.  T-max last 24 hours 99.1.  Blood pressure downtrending but still elevated.  On room air.  Labs with WBC 7.64, hemoglobin 9.6, platelet 145. Cultures no growth today.  CTA with severe right hydronephrosis with 6 mm calculi.  Chest x-ray clear.  Discussed with patient.  RN in room during my encounter.      Review of Systems   Constitutional: Positive for chills.   Respiratory: Negative for shortness of breath.    Cardiovascular: Negative for chest pain.   Gastrointestinal: Positive for diarrhea and nausea.   Genitourinary:        Still produces urine   Skin: Positive for color change and wound.   Psychiatric/Behavioral: Negative for confusion.     Objective:     Vital Signs (Most Recent):  Temp: 99.1 °F (37.3 °C) (01/17/22 1522)  Pulse: 74 (01/17/22 1522)  Resp: 19 (01/17/22 1522)  BP: (!) 169/68 (01/17/22 1522)  SpO2: 98 % (01/17/22 1522) Vital Signs (24h Range):  Temp:  [98 °F (36.7 °C)-99.1 °F (37.3 °C)] 99.1 °F (37.3 °C)  Pulse:  [65-75] 74  Resp:  [18-20] 19  SpO2:  [97 %-98 %] 98 %  BP: (138-169)/(67-81) 169/68     Weight: 67.3 kg (148 lb 5.9 oz)  Body mass index is 22.89 kg/m².  No intake or output data in the 24 hours ending 01/17/22 1703   Physical Exam  Vitals and nursing note reviewed.   Constitutional:       Comments: Lying in bed, cooperative, chronically ill-appearing   HENT:      Head: Normocephalic and atraumatic.      Mouth/Throat:      Mouth: Mucous membranes are moist.   Cardiovascular:      Rate and Rhythm: Normal rate and regular rhythm.      Comments: Trace lower extremity edema  Pulmonary:      Comments: On room air  Abdominal:      General: Bowel sounds are normal. There is no distension.       Palpations: Abdomen is soft.      Tenderness: There is no abdominal tenderness.   Skin:     Comments: Left upper extremity AVF with bruising distal aspect   Neurological:      Mental Status: She is alert and oriented to person, place, and time.   Psychiatric:         Mood and Affect: Mood normal.         Significant Labs:   Blood Culture:   Recent Labs   Lab 01/16/22  1115 01/16/22  1130   LABBLOO No Growth to date  No Growth to date No Growth to date  No Growth to date     BMP:   Recent Labs   Lab 01/17/22  0438   GLU 67*      K 4.7      CO2 21*   BUN 32*   CREATININE 6.1*   CALCIUM 8.0*   MG 1.5*     CBC:   Recent Labs   Lab 01/16/22 1115 01/17/22  0438   WBC 7.12 7.64   HGB 9.4* 9.6*   HCT 29.7* 30.9*   * 145*     CMP:   Recent Labs   Lab 01/16/22  1115 01/17/22  0438    136   K 4.0 4.7    104   CO2 23 21*   GLU 95 67*   BUN 29* 32*   CREATININE 5.7* 6.1*   CALCIUM 8.3* 8.0*   PROT 6.0  --    ALBUMIN 3.1*  --    BILITOT 0.8  --    ALKPHOS 98  --    AST 12  --    ALT 10  --    ANIONGAP 14 11   EGFRNONAA 6.9* 6.4*     Lactic Acid:   Recent Labs   Lab 01/16/22  1134   LACTATE 1.0     Magnesium:   Recent Labs   Lab 01/16/22 1115 01/17/22  0438   MG 1.6 1.5*     POCT Glucose: No results for input(s): POCTGLUCOSE in the last 48 hours.    Significant Imaging: I have reviewed and interpreted all pertinent imaging results/findings within the past 24 hours.     CT Abdomen Pelvis With Contrast    Result Date: 1/16/2022  EXAMINATION: CT ABDOMEN PELVIS WITH CONTRAST CLINICAL HISTORY: Abdominal abscess/infection suspected; TECHNIQUE: 100 cc Omnipaque 350 was administered. CMS Mandated Quality Data-CT Radiation Dose-436 All CT scans at this facility dose modulation, iterative reconstruction, and or weight-based dosing when appropriate to reduce radiation dose to as low as reasonably achievable. COMPARISON: Abdominal sonogram 06/01/2021, and CT abdomen pelvis 03/15/2021 FINDINGS: Lung bases are  clear.  Bone window images demonstrate levocurvature of the lumbar spine.  Degenerative changes of the spine.  No acute or aggressive osseous abnormality. No focal hepatic lesion.  Gallbladder is surgically absent.  Dilation of intrahepatic and extrahepatic biliary systems.  The extrahepatic bile duct measures up to 10 mm in diameter.  Spleen appears normal.  Left upper quadrant splenule.  Pancreas is atrophic. No adrenal lesion.  Bilateral nephrolithiasis and bilateral renal atrophy.  Severe right hydroureteronephrosis, with double-J ureteral stent in place.  The proximal aspect of the stent is formed within the inferior aspect of the dilated right renal pelvis, and the distal portion of the stent is present within the urinary bladder.  There is a 6 mm calculus within the midportion of the right ureter.  Right-sided urothelial enhancement and perinephric stranding.  Left ureter is normal in caliber.  No focal lesion of the urinary bladder is evident. Stomach is largely collapsed.  No evidence of small-bowel obstruction.  Postoperative changes involving the proximal colon.  Moderate volume gas and fecal material within the colon. No free fluid or free air within the abdomen or pelvis.  Extensive aortoiliac atherosclerotic calcification.  Normal caliber abdominal aorta.  Shotty retroperitoneal lymph nodes.  Subcutaneous edema of the thighs.     Severe right hydroureteronephrosis; with right ureteral stent in place and 6 mm calculus within the mid right ureter. Right perinephric stranding and urothelial enhancement suggestive of pyelonephritis/urinary tract infection.  Please correlate with urinalysis. Bilateral nephrolithiasis. Stable intrahepatic and extrahepatic bile duct dilation, status post cholecystectomy.  Please correlate with bilirubin levels. Moderate volume gas and fecal material within the colon suggesting constipation. Extensive aortoiliac atherosclerosis. Electronically signed by: Justino Rivera MD  Date:    01/16/2022 Time:    12:39    X-Ray Chest AP Portable    Result Date: 1/16/2022  EXAMINATION: XR CHEST AP PORTABLE CLINICAL HISTORY: fever; COMPARISON: 09/15/2021 FINDINGS: Cardiac silhouette size is enlarged and stable from prior.  Atherosclerotic calcification of the aorta.  No confluent airspace disease.  No pleural fluid or pneumothorax.  Stent or graft within the left brachial region.     Cardiomegaly.  No acute pulmonary pathology. Electronically signed by: Justino Rivera MD Date:    01/16/2022 Time:    12:07    US Hemodialysis Access    Result Date: 1/17/2022  Reason: avf malfunction lt upper ext COMPARISON: Left upper extremity dialysis access ultrasound 2/4/2021 FINDINGS: Left upper extremity arterial venous graft is patent. At arterial anastomosis, peak systolic velocity reaches 470 cm/s. At the venous anastomosis, peak systolic velocity reaches 592 cm/s. No perigraft fluid collection identified. IMPRESSION: Patent left upper extremity arteriovenous graft. Electronically signed by:  Wellington Epperson MD  1/17/2022 4:58 PM [facility] Workstation: [id number]    Echocardiogram  Summary    · Mild concentric left ventricular hypertrophy.  · Pulmonary hypertension present.  · Normal left ventricular systolic function. The estimated ejection fraction is 60%.  · Grade I (mild) left ventricular diastolic dysfunction consistent with impaired relaxation.  · No wall motion abnormalities.  · Normal right ventricular systolic function.  · Intermediate central venous pressure (8 mmHg).  · Trivial pericardial effusion.  · There is a left pleural effusion.  · The estimated PA systolic pressure is 47 mmHg.

## 2022-01-17 NOTE — CONSULTS
Consult Note  Infectious Disease    Reason for Consult:  Vulvar cellulitis    HPI: Shara Hutchins is a 71 y.o. female well known to me from prior consultations, last seen 12/14/20 for same diagnosis. She presented to the ED /16 with 2 weeks of worsening vulvar and perineal irritation and  Subsequent 2-3 days of cellulitis, low grade fever, and swelling of the labia and perineum, extending to rectum and buttocks. CT abd/pelvis was performed with severe right hydroureteronephrosis, stent in place, stone right mid ureter, some perinephric stranding and moderate volume gas and fecal material suggesting constipation. (last stent exchange was 5/18/21 and has not followed up with urology since. She states this is because she cannot afford the visits. She forgot that she still had a stent in place). She was started on vanc, meropenem and fluconazole. Blood cultures are negative so far. No urinalysis was obtained as it would have been contaminated or too painful. She has infiltration along AVF making dialysis not possible through that access and vascular consult pending for percutaneous access.     Review of patient's allergies indicates:   Allergen Reactions    Ciprofloxacin Other (See Comments)     Elevated liver enzymes      Pcn [penicillins] Anaphylaxis     TOLERATES ROCEPHIN WITHOUT DIFFICULTY     Past Medical History:   Diagnosis Date    Cellulitis of trunk     Perineum, numerous episodes    Chronic diarrhea 02/10/2020    Short-gut syndrome    Diabetes mellitus, type 2     pt has not had for years after losing weight    End stage renal disease on dialysis 01/07/2018    Fatigue     Hypertension     Hypothyroidism 2/10/2020    Iron deficiency anemia due to chronic blood loss 1/7/2018    Pulmonary hypertension 3/20/2020    Vulvar cancer 1/7/2019     Past Surgical History:   Procedure Laterality Date    ABDOMINAL SURGERY      APPENDECTOMY      AV FISTULA PLACEMENT Left 2018    BACK SURGERY      BLADDER  FULGURATION N/A 6/16/2020    Procedure: FULGURATION, BLADDER;  Surgeon: Damari Barber Jr., MD;  Location: [facility] OR;  Service: Urology;  Laterality: N/A;    carpel tunnel surgery once on each hand      CERVICAL FUSION      x 4    cervix repair      CHOLECYSTECTOMY  2000    COLECTOMY      CYSTOSCOPY W/ RETROGRADES  6/16/2020    Procedure: CYSTOSCOPY, WITH RETROGRADE PYELOGRAM;  Surgeon: Damari Barber Jr., MD;  Location: [facility] OR;  Service: Urology;;    CYSTOSCOPY W/ RETROGRADES Right 4/20/2021    Procedure: CYSTOSCOPY, WITH RETROGRADE PYELOGRAM;  Surgeon: Damari Barber Jr., MD;  Location: [facility] OR;  Service: Urology;  Laterality: Right;    CYSTOSCOPY W/ URETERAL STENT PLACEMENT Right 3/16/2021    Procedure: CYSTOSCOPY, WITH URETERAL STENT INSERTION;  Surgeon: Damari Barber Jr., MD;  Location: [facility] OR;  Service: Urology;  Laterality: Right;    CYSTOSCOPY W/ URETERAL STENT PLACEMENT Right 5/18/2021    Procedure: CYSTOSCOPY, WITH URETERAL STENT INSERTION;  Surgeon: Damari Barber Jr., MD;  Location: [facility] OR;  Service: Urology;  Laterality: Right;    CYSTOSCOPY W/ URETERAL STENT REMOVAL Right 4/20/2021    Procedure: CYSTOSCOPY, WITH URETERAL STENT REMOVAL;  Surgeon: Damari Barber Jr., MD;  Location: [facility] OR;  Service: Urology;  Laterality: Right;    CYSTOSCOPY WITH URETEROSCOPY, RETROGRADE PYELOGRAPHY, AND INSERTION OF STENT Right 3/30/2021    Procedure: CYSTOSCOPY, WITH RETROGRADE PYELOGRAM AND URETERAL STENT INSERTION;  Surgeon: Damari Barber Jr., MD;  Location: [facility] OR;  Service: Urology;  Laterality: Right;    DIAGNOSTIC LAPAROSCOPY N/A 2/13/2020    Procedure: LAPAROSCOPY, DIAGNOSTIC;  Surgeon: Robbi Lovell III, MD;  Location: [facility] OR;  Service: General;  Laterality: N/A;    HYSTERECTOMY  1980    ILEOSTOMY N/A 2/13/2020    Procedure: CREATION, ILEOSTOMY Loop;  Surgeon: Robbi Lovell III, MD;  Location: [facility] OR;  Service: General;  Laterality: N/A;    ILEOSTOMY CLOSURE N/A 5/18/2020     Procedure: CLOSURE, ILEOSTOMY;  Surgeon: Robbi Lovell III, MD;  Location: [facility] OR;  Service: General;  Laterality: N/A;    LASER LITHOTRIPSY Right 3/30/2021    Procedure: LITHOTRIPSY, USING LASER;  Surgeon: Damari Barber Jr., MD;  Location: [facility] OR;  Service: Urology;  Laterality: Right;    LUMBAR LAMINECTOMY      LYSIS OF ADHESIONS N/A 2020    Procedure: LYSIS, ADHESIONS;  Surgeon: Robbi Lovell III, MD;  Location: [facility] OR;  Service: General;  Laterality: N/A;    NECK SURGERY      PHLEBOGRAPHY  2020    Procedure: VENOGRAM;  Surgeon: Robbi Lovell III, MD;  Location: [facility] OR;  Service: General;;    REMOVAL OF VASCULAR ACCESS PORT Right 2020    Procedure: REMOVAL, VASCULAR ACCESS PORT;  Surgeon: Robbi Lovell III, MD;  Location: [facility] OR;  Service: General;  Laterality: Right;    RETROGRADE PYELOGRAPHY  3/16/2021    Procedure: PYELOGRAM, RETROGRADE;  Surgeon: Damari Barber Jr., MD;  Location: [facility] OR;  Service: Urology;;    RETROGRADE PYELOGRAPHY Right 2021    Procedure: PYELOGRAM, RETROGRADE;  Surgeon: Damari Barber Jr., MD;  Location: [facility] OR;  Service: Urology;  Laterality: Right;    SHOULDER SURGERY      URETEROSCOPIC REMOVAL OF URETERIC CALCULUS Right 3/30/2021    Procedure: REMOVAL, CALCULUS, URETER, URETEROSCOPIC;  Surgeon: Damari Barber Jr., MD;  Location: [facility] OR;  Service: Urology;  Laterality: Right;    URETEROSCOPIC REMOVAL OF URETERIC CALCULUS Right 2021    Procedure: REMOVAL, CALCULUS, URETER, URETEROSCOPIC;  Surgeon: Damari Barber Jr., MD;  Location: [facility] OR;  Service: Urology;  Laterality: Right;    vulva cancer       Social History     Socioeconomic History    Marital status:    Tobacco Use    Smoking status: Former Smoker     Packs/day: 1.00     Years: 33.00     Pack years: 33.00     Types: Cigarettes     Quit date: 2000     Years since quittin.0    Smokeless tobacco: Former User     Quit date:    Substance and  Sexual Activity    Alcohol use: No    Drug use: No     Family History   Problem Relation Age of Onset    Heart disease Mother     Hypertension Mother     Cancer Mother         lung    Heart disease Father     Heart disease Sister         Open heart surgery    No Known Problems Daughter          Review of Systems:   No chills,   No change in vision, l   No pain in mouth or throat. No problems with teeth, gums.  No chest pain,   No cough, sputum production, shortness of breath,  Nausea without vomiting. Has chronic frequent diarrhea, no constipation,  or focal abd pain,      dysuria, without hematuria, but bloody weeping from perineum. No strangury, but does not feel she is emptying well. chronic incontinence, nocturia,        No swelling of joints, redness of joints, injuries   No unusual headaches,   No anxiety, depression, substance abuse,    No diabetes, any more     No recent/prior steroids       EXAM & DIAGNOSTICS REVIEWED:   Vitals:     Temp:  [98 °F (36.7 °C)-98.9 °F (37.2 °C)]   Temp: 98.2 °F (36.8 °C) (01/17/22 0724)  Pulse: 70 (01/17/22 0724)  Resp: 20 (01/17/22 0821)  BP: (!) 165/77 (01/17/22 0724)  SpO2: 97 % (01/17/22 0724)    Intake/Output Summary (Last 24 hours) at 1/17/2022 1213  Last data filed at 1/16/2022 1522  Gross per 24 hour   Intake 50 ml   Output --   Net 50 ml       General:  In NAD. Alert and attentive, cooperative, comfortable  Eyes:  Anicteric, PERRL, EOMI  ENT:  No ulcers, exudates, thrush, nares patent, dentition is good  Neck:  supple, no masses or adenopathy appreciated  Lungs: Faint, coarse,  Heart:  RRR, no gallop/murmur/rub noted  Abd:  Soft, NT, ND, normal BS, no masses or organomegaly appreciated.  :  Voids  no flank tenderness. Has dense, chronic edema of the mons pubis, labia and inner buttocks. No sign of necrotizing skin infection. No focal abscesses.  Musc:  Joints without effusion, swelling, erythema, synovitis, muscle wasting.   Skin:  No rashes.   Neuro:              Alert, attentive, speech fluent, face symmetric, moves all extremities, no focal weakness. Ambulatory  Psych: Calm, cooperative  Lymphatic:     No cervical, supraclavicular, axillary, or inguinal nodes  Extrem: Left upper arm ecchymosis at AVF. No cellulitis  VAD:     [ero[rochelle;  Isolation:  none  Wound:       Lines/Tubes/Drains:    General Labs reviewed:  Recent Labs   Lab 01/16/22  1115 01/17/22  0438   WBC 7.12 7.64   HGB 9.4* 9.6*   HCT 29.7* 30.9*   * 145*       Recent Labs   Lab 01/16/22  1115 01/17/22  0438    136   K 4.0 4.7    104   CO2 23 21*   BUN 29* 32*   CREATININE 5.7* 6.1*   CALCIUM 8.3* 8.0*   PROT 6.0  --    BILITOT 0.8  --    ALKPHOS 98  --    ALT 10  --    AST 12  --      No results for input(s): CRP in the last 168 hours.      Micro:  Microbiology Results (last 7 days)     Procedure Component Value Units Date/Time    Blood culture #2 **CANNOT BE ORDERED STAT** [[id number]] Collected: 01/16/22 1130    Order Status: Completed Specimen: Blood from Peripheral, Antecubital, Right Updated: 01/16/22 1917     Blood Culture, Routine No Growth to date    Blood culture #1 **CANNOT BE ORDERED STAT** [[id number]] Collected: 01/16/22 1115    Order Status: Completed Specimen: Blood from Peripheral, Antecubital, Right Updated: 01/16/22 1758     Blood Culture, Routine No Growth to date          Imaging Reviewed:   CXR   CT abd/pelvis    Cardiology:    IMPRESSION & PLAN   1. Chronic perineal lymphedema, h/o vulvar cancer and radiation  2. Secondary cellulitis of mons, labia and inner buttocks  3. Urinary incontinence, sensation of incomplete emptying   Indwelling right ureteral stent, last change 5/2021  4.  ESRD on dialysis  5. Chronic diarrhea, short gut syndrome      Recommendations:  vanc and rocephin are adequate  Local care, wound care consult  Oral diflucan  Pain control. I increased hydrocodone to oxycodone    Will f/u    Medical Decision Making during this encounter was  [_] Low  Complexity  [_] Moderate Complexity  [ xx ] High Complexity

## 2022-01-17 NOTE — HPI
Patient presents to the emergency department with vulvar cellulitis that is recurrent.  The patient states that about 1 week ago she developed itching, redness, increased pain to the vulva.  She has a pertinent past medical history of fall vol cancer status post radiation and has since had recurrent episodes of cellulitis in the area.  She reports applying different types of creams such as Neosporin, Reginald Butt paste, calseptin, Aquaphor, and others with no improvement and about 3 days ago the symptoms worsened.  Her history is also complicated by chronic diarrhea since being treated with radiation in which she has loose incontinent stools multiple times per day.     The patient's current problem of cellulitis begins at superior-most aspect of her vulva and extends across the entire perineum to between the gluteal folds. The cellulitis also extends into the groin bilaterally.  The patient's pain has progressed to severe, nonradiating, described as burning, itching and dull.  Unrelieved by any home analgesics or topical medications.  Associated with intermittent low grade fever (not measured at home), occasional bleeding from excoriations of the skin, and hardened swelling of the affected area.  Or she also has intermittent nausea she believes is associated with the severe pain.  No vomiting.  The patient has been evaluated and treated by Dr. Blanca Neal in the past.  The patient was treated with meropenem and vancomycin in the emergency department.  PCN allergy noted and the patient reported tolerating meropenem and the past.     Imaging obtained in the emergency department incidentally revealed an obstructive, right-sided nephrolithiasis by 6 mm kidney stone that is causing perinephric fat stranding/hydronephrosis..  She has a history of nephrolithiasis and a CT.  The patient did receive IV contrast in the emergency department for this imaging and has hemodialysis 3 times a week overseen by Dr. Dietz.  The  patient denied flank pain, hematuria, increased urination or urgency.  She does have a history of anuria.

## 2022-01-17 NOTE — NURSING
Spoke w/ dr perry in regards to placing a dialysis line. MD states it will ne tomorrow. Patient can become npo after breakfast. patient updated on information.

## 2022-01-17 NOTE — PROGRESS NOTES
Pt. Had arterial infiltrate of AVF at clinic on Friday, today venous site infiltrated, unable to obtain venous access per 3 dialysis personal, ice applied to venous site, Dr Dietz notified.Report called to floor nurse,

## 2022-01-17 NOTE — PROGRESS NOTES
[facility] Medicine  Progress Note    Patient Name: Shara Hutchins  MRN: [id number]  Patient Class: IP- Inpatient   Admission Date: 1/16/2022  Length of Stay: 1 days  Attending Physician: Dana Ovalle MD  Primary Care Provider: April Horton MD        Subjective:     Principal Problem:Vulvar cellulitis        HPI:  Patient presents to the emergency department with vulvar cellulitis that is recurrent.  The patient states that about 1 week ago she developed itching, redness, increased pain to the vulva.  She has a pertinent past medical history of fall vol cancer status post radiation and has since had recurrent episodes of cellulitis in the area.  She reports applying different types of creams such as Neosporin, Reginald Butt paste, calseptin, Aquaphor, and others with no improvement and about 3 days ago the symptoms worsened.  Her history is also complicated by chronic diarrhea since being treated with radiation in which she has loose incontinent stools multiple times per day.     The patient's current problem of cellulitis begins at superior-most aspect of her vulva and extends across the entire perineum to between the gluteal folds. The cellulitis also extends into the groin bilaterally.  The patient's pain has progressed to severe, nonradiating, described as burning, itching and dull.  Unrelieved by any home analgesics or topical medications.  Associated with intermittent low grade fever (not measured at home), occasional bleeding from excoriations of the skin, and hardened swelling of the affected area.  Or she also has intermittent nausea she believes is associated with the severe pain.  No vomiting.  The patient has been evaluated and treated by Dr. Blanca Neal in the past.  The patient was treated with meropenem and vancomycin in the emergency department.  PCN allergy noted and the patient reported tolerating meropenem and the past.     Imaging obtained in the emergency  department incidentally revealed an obstructive, right-sided nephrolithiasis by 6 mm kidney stone that is causing perinephric fat stranding/hydronephrosis..  She has a history of nephrolithiasis and a CT.  The patient did receive IV contrast in the emergency department for this imaging and has hemodialysis 3 times a week overseen by Dr. Dietz.  The patient denied flank pain, hematuria, increased urination or urgency.  She does have a history of anuria.      Overview/Hospital Course:  No notes on file    Interval History:  Patient reports severe persistent pain to perineal and sacral region, POA, IV morphine is helpful.  Chronic nausea and chronic diarrhea, denies any recent change.  Denies any shortness of breath.  States/radiation therapy was 2013. States she is feeling cold.  Telemetry sinus rhythm.  T-max last 24 hours 99.1.  Blood pressure downtrending but still elevated.  On room air.  Labs with WBC 7.64, hemoglobin 9.6, platelet 145. Cultures no growth today.  CTA with severe right hydronephrosis with 6 mm calculi.  Chest x-ray clear.  Discussed with patient.  RN in room during my encounter.      Review of Systems   Constitutional: Positive for chills.   Respiratory: Negative for shortness of breath.    Cardiovascular: Negative for chest pain.   Gastrointestinal: Positive for diarrhea and nausea.   Genitourinary:        Still produces urine   Skin: Positive for color change and wound.   Psychiatric/Behavioral: Negative for confusion.     Objective:     Vital Signs (Most Recent):  Temp: 99.1 °F (37.3 °C) (01/17/22 1522)  Pulse: 74 (01/17/22 1522)  Resp: 19 (01/17/22 1522)  BP: (!) 169/68 (01/17/22 1522)  SpO2: 98 % (01/17/22 1522) Vital Signs (24h Range):  Temp:  [98 °F (36.7 °C)-99.1 °F (37.3 °C)] 99.1 °F (37.3 °C)  Pulse:  [65-75] 74  Resp:  [18-20] 19  SpO2:  [97 %-98 %] 98 %  BP: (138-169)/(67-81) 169/68     Weight: 67.3 kg (148 lb 5.9 oz)  Body mass index is 22.89 kg/m².  No intake or output data in the 24  hours ending 01/17/22 1703   Physical Exam  Vitals and nursing note reviewed.   Constitutional:       Comments: Lying in bed, cooperative, chronically ill-appearing   HENT:      Head: Normocephalic and atraumatic.      Mouth/Throat:      Mouth: Mucous membranes are moist.   Cardiovascular:      Rate and Rhythm: Normal rate and regular rhythm.      Comments: Trace lower extremity edema  Pulmonary:      Comments: On room air  Abdominal:      General: Bowel sounds are normal. There is no distension.      Palpations: Abdomen is soft.      Tenderness: There is no abdominal tenderness.   Skin:     Comments: Left upper extremity AVF with bruising distal aspect   Neurological:      Mental Status: She is alert and oriented to person, place, and time.   Psychiatric:         Mood and Affect: Mood normal.         Significant Labs:   Blood Culture:   Recent Labs   Lab 01/16/22 1115 01/16/22  1130   LABBLOO No Growth to date  No Growth to date No Growth to date  No Growth to date     BMP:   Recent Labs   Lab 01/17/22  0438   GLU 67*      K 4.7      CO2 21*   BUN 32*   CREATININE 6.1*   CALCIUM 8.0*   MG 1.5*     CBC:   Recent Labs   Lab 01/16/22 1115 01/17/22  0438   WBC 7.12 7.64   HGB 9.4* 9.6*   HCT 29.7* 30.9*   * 145*     CMP:   Recent Labs   Lab 01/16/22 1115 01/17/22  0438    136   K 4.0 4.7    104   CO2 23 21*   GLU 95 67*   BUN 29* 32*   CREATININE 5.7* 6.1*   CALCIUM 8.3* 8.0*   PROT 6.0  --    ALBUMIN 3.1*  --    BILITOT 0.8  --    ALKPHOS 98  --    AST 12  --    ALT 10  --    ANIONGAP 14 11   EGFRNONAA 6.9* 6.4*     Lactic Acid:   Recent Labs   Lab 01/16/22  1134   LACTATE 1.0     Magnesium:   Recent Labs   Lab 01/16/22 1115 01/17/22  0438   MG 1.6 1.5*     POCT Glucose: No results for input(s): POCTGLUCOSE in the last 48 hours.    Significant Imaging: I have reviewed and interpreted all pertinent imaging results/findings within the past 24 hours.     CT Abdomen Pelvis With  Contrast    Result Date: 1/16/2022  EXAMINATION: CT ABDOMEN PELVIS WITH CONTRAST CLINICAL HISTORY: Abdominal abscess/infection suspected; TECHNIQUE: 100 cc Omnipaque 350 was administered. CMS Mandated Quality Data-CT Radiation Dose-436 All CT scans at this facility dose modulation, iterative reconstruction, and or weight-based dosing when appropriate to reduce radiation dose to as low as reasonably achievable. COMPARISON: Abdominal sonogram 06/01/2021, and CT abdomen pelvis 03/15/2021 FINDINGS: Lung bases are clear.  Bone window images demonstrate levocurvature of the lumbar spine.  Degenerative changes of the spine.  No acute or aggressive osseous abnormality. No focal hepatic lesion.  Gallbladder is surgically absent.  Dilation of intrahepatic and extrahepatic biliary systems.  The extrahepatic bile duct measures up to 10 mm in diameter.  Spleen appears normal.  Left upper quadrant splenule.  Pancreas is atrophic. No adrenal lesion.  Bilateral nephrolithiasis and bilateral renal atrophy.  Severe right hydroureteronephrosis, with double-J ureteral stent in place.  The proximal aspect of the stent is formed within the inferior aspect of the dilated right renal pelvis, and the distal portion of the stent is present within the urinary bladder.  There is a 6 mm calculus within the midportion of the right ureter.  Right-sided urothelial enhancement and perinephric stranding.  Left ureter is normal in caliber.  No focal lesion of the urinary bladder is evident. Stomach is largely collapsed.  No evidence of small-bowel obstruction.  Postoperative changes involving the proximal colon.  Moderate volume gas and fecal material within the colon. No free fluid or free air within the abdomen or pelvis.  Extensive aortoiliac atherosclerotic calcification.  Normal caliber abdominal aorta.  Shotty retroperitoneal lymph nodes.  Subcutaneous edema of the thighs.     Severe right hydroureteronephrosis; with right ureteral stent in  place and 6 mm calculus within the mid right ureter. Right perinephric stranding and urothelial enhancement suggestive of pyelonephritis/urinary tract infection.  Please correlate with urinalysis. Bilateral nephrolithiasis. Stable intrahepatic and extrahepatic bile duct dilation, status post cholecystectomy.  Please correlate with bilirubin levels. Moderate volume gas and fecal material within the colon suggesting constipation. Extensive aortoiliac atherosclerosis. Electronically signed by: Justino Rivera MD Date:    01/16/2022 Time:    12:39    X-Ray Chest AP Portable    Result Date: 1/16/2022  EXAMINATION: XR CHEST AP PORTABLE CLINICAL HISTORY: fever; COMPARISON: 09/15/2021 FINDINGS: Cardiac silhouette size is enlarged and stable from prior.  Atherosclerotic calcification of the aorta.  No confluent airspace disease.  No pleural fluid or pneumothorax.  Stent or graft within the left brachial region.     Cardiomegaly.  No acute pulmonary pathology. Electronically signed by: Justino Rivera MD Date:    01/16/2022 Time:    12:07    US Hemodialysis Access    Result Date: 1/17/2022  Reason: avf malfunction lt upper ext COMPARISON: Left upper extremity dialysis access ultrasound 2/4/2021 FINDINGS: Left upper extremity arterial venous graft is patent. At arterial anastomosis, peak systolic velocity reaches 470 cm/s. At the venous anastomosis, peak systolic velocity reaches 592 cm/s. No perigraft fluid collection identified. IMPRESSION: Patent left upper extremity arteriovenous graft. Electronically signed by:  Wellington Epperson MD  1/17/2022 4:58 PM [facility] Workstation: [id number]    Echocardiogram  Summary    · Mild concentric left ventricular hypertrophy.  · Pulmonary hypertension present.  · Normal left ventricular systolic function. The estimated ejection fraction is 60%.  · Grade I (mild) left ventricular diastolic dysfunction consistent with impaired relaxation.  · No wall motion abnormalities.  · Normal right ventricular  systolic function.  · Intermediate central venous pressure (8 mmHg).  · Trivial pericardial effusion.  · There is a left pleural effusion.  · The estimated PA systolic pressure is 47 mmHg.           Assessment/Plan:      Active Hospital Problems    Diagnosis    *Vulvar cellulitis    Right hydroureter secondary to obstructing calculi    Thrombocytopenia    Lymphedema due to radiation    Urinary incontinence    S/P ureteral stent placement    Hypomagnesemia    ESRD on dialysis MWF    Essential hypertension    Pulmonary hypertension    Hypothyroidism    Anemia of chronic disease    Chronic diarrhea    Hisotry of Vulvar cancer     Plan:  Continue care on medical floor with remote telemetry monitoring  Continue antimicrobials with vancomycin and Rocephin, p.o. fluconazole  Wound Care consulted for perineum  NPO midnight for vascular surgery and Urology intervention  1 g IV magnesium replacement and following  Adjusted p.r.n. pain control  Renally dosing all medications and avoid nephrotoxin drugs  UA to be collected during procedure due to severe discomfort if possible   A.m. labs ordered  Appreciate all consultant's input  Vascular surgery consulted due to infiltrated AVF  Further plan as per clinical course    VTE Risk Mitigation (From admission, onward)         Ordered     enoxaparin injection 30 mg  Daily         01/16/22 1502     IP VTE HIGH RISK PATIENT  Once         01/16/22 1502     Place sequential compression device  Until discontinued         01/16/22 1502                Discharge Planning   RODRIGO: 1/19/2022     Code Status: Full Code   Is the patient medically ready for discharge?:     Reason for patient still in hospital (select all that apply): Patient trending condition  Discharge Plan A: Assisted Living                  Dana Ovalle MD  Department of Hospital Medicine   [facility]

## 2022-01-17 NOTE — PLAN OF CARE
[facility]  Initial Discharge Assessment       Primary Care Provider: April Horton MD    Admission Diagnosis: Cellulitis, unspecified cellulitis site [L03.90]    Admission Date: 1/16/2022  Expected Discharge Date:      Assessment completed: At bedside with the patient  Advanced Directives: Living will ( reports it is on file with [facility])  Marital Status:   Children: 1 Adult  Next of Kin: Daughter    Needs: None    Discharge Plan: Return to [facility].    CM  will continue to follow.        Discharge Barriers Identified: None    Payor: [employer] / Plan: [facility] [employer] / Product Type: Government /     Extended Emergency Contact Information  Primary Emergency Contact: Stephanie Aguiar   [facility]  Home Phone: [phone]  Relation: Relative   needed? No  Secondary Emergency Contact: demario jimenez  Mobile Phone: [phone]  Relation: Daughter   needed? No    Discharge Plan A: Assisted Living  Discharge Plan B: Assisted Living      [facility] [address]  [address]  [facility] [address]  Phone: [phone] Fax: [phone]    [facility]  [address]  [facility] [address]  Phone: [phone] Fax: [phone]      Initial Assessment (most recent)     Adult Discharge Assessment - 01/17/22 1530        Discharge Assessment    Assessment Type Discharge Planning Assessment     Confirmed/corrected address, phone number and insurance Yes     Confirmed Demographics Correct on Facesheet     Source of Information patient     When was your last doctors appointment? --   6 months ago    Communicated RODRIGO with patient/caregiver Date not available/Unable to determine     Reason For Admission vulvar cellulitis     Lives With alone   [facility]    Facility Arrived From: [facility]     Do you expect to return to your current living situation? Yes     Do you have help at home or someone to  help you manage your care at home? Yes     Who are your caregiver(s) and their phone number(s)? Staff of [facility] assists her as needed     Prior to hospitilization cognitive status: Alert/Oriented     Current cognitive status: Alert/Oriented     Walking or Climbing Stairs Difficulty none     Dressing/Bathing Difficulty none     Home Accessibility wheelchair accessible     Home Layout Able to live on 1st floor     Equipment Currently Used at Home none     Readmission within 30 days? No     Patient currently being followed by outpatient case management? Yes     If yes, name of outpatient case management following: insurance company assigned oupatient case management     Do you currently have service(s) that help you manage your care at home? No     Do you take prescription medications? Yes     Do you have prescription coverage? Yes     Coverage BCBS     Do you have any problems affording any of your prescribed medications? No     Is the patient taking medications as prescribed? yes     Who is going to help you get home at discharge? [facility]     How do you get to doctors appointments? other (see comments)   Facility van    Are you on dialysis? Yes     Dialysis Name and Scheduled days [address]  MWF 0800     Do you take coumadin? No     Discharge Plan A Assisted Living     Discharge Plan B Assisted Living     DME Needed Upon Discharge  none     Discharge Plan discussed with: Patient     Discharge Barriers Identified None

## 2022-01-18 NOTE — CONSULTS
Nephrology Consult Note        Patient Name: Shara Hutchins  MRN: [id number]    Patient Class: IP- Inpatient   Admission Date: 1/16/2022  Length of Stay: 2 days  Date of Service: 1/18/2022    Attending Physician: No att. providers found  Primary Care Provider: April Horton MD    Reason for Consult: esrd/anemia/htn/shpt/vulvar cellulitis    SUBJECTIVE:     HPI: ESRD patient on HD MWF presents to the emergency department with recurrent vulvar cellulitis, complicated by chronic diarrhea since being treated with radiation. Unrelieved by any home analgesics or topical medications. Associated with intermittent low grade fever, occasional bleeding from excoriations of the skin, and hardened swelling of the affected area. She has intermittent nausea associated with the severe pain.     Imaging obtained in the emergency department incidentally revealed an obstructive, right-sided nephrolithiasis by 6 mm kidney stone causing perinephric fat stranding/hydronephrosis. The patient denied flank pain, hematuria, increased urination or urgency.    1/17  AVF non-functioning.  Consult Vascular surgery for trialysis catheter and avf evaluation  1/18  Good avf flow on ultrasound.  At a procedure    Past Medical History:   Diagnosis Date    Cellulitis of trunk     Perineum, numerous episodes    Chronic diarrhea 02/10/2020    Short-gut syndrome    Diabetes mellitus, type 2     pt has not had for years after losing weight    End stage renal disease on dialysis 01/07/2018    Fatigue     Hypertension     Hypothyroidism 2/10/2020    Iron deficiency anemia due to chronic blood loss 1/7/2018    Pulmonary hypertension 3/20/2020    Vulvar cancer 1/7/2019     Past Surgical History:   Procedure Laterality Date    ABDOMINAL SURGERY      APPENDECTOMY      AV FISTULA PLACEMENT Left 2018    BACK SURGERY      BLADDER FULGURATION N/A 6/16/2020    Procedure: FULGURATION, BLADDER;  Surgeon: Damari Barber Jr., MD;  Location: [facility] OR;   Service: Urology;  Laterality: N/A;    carpel tunnel surgery once on each hand      CERVICAL FUSION      x 4    cervix repair      CHOLECYSTECTOMY  2000    COLECTOMY      CYSTOSCOPY W/ RETROGRADES  6/16/2020    Procedure: CYSTOSCOPY, WITH RETROGRADE PYELOGRAM;  Surgeon: Damari Barber Jr., MD;  Location: [facility] OR;  Service: Urology;;    CYSTOSCOPY W/ RETROGRADES Right 4/20/2021    Procedure: CYSTOSCOPY, WITH RETROGRADE PYELOGRAM;  Surgeon: Damari Barber Jr., MD;  Location: [facility] OR;  Service: Urology;  Laterality: Right;    CYSTOSCOPY W/ URETERAL STENT PLACEMENT Right 3/16/2021    Procedure: CYSTOSCOPY, WITH URETERAL STENT INSERTION;  Surgeon: Damari Barber Jr., MD;  Location: [facility] OR;  Service: Urology;  Laterality: Right;    CYSTOSCOPY W/ URETERAL STENT PLACEMENT Right 5/18/2021    Procedure: CYSTOSCOPY, WITH URETERAL STENT INSERTION;  Surgeon: Damari Barber Jr., MD;  Location: [facility] OR;  Service: Urology;  Laterality: Right;    CYSTOSCOPY W/ URETERAL STENT REMOVAL Right 4/20/2021    Procedure: CYSTOSCOPY, WITH URETERAL STENT REMOVAL;  Surgeon: Damari Barber Jr., MD;  Location: [facility] OR;  Service: Urology;  Laterality: Right;    CYSTOSCOPY WITH URETEROSCOPY, RETROGRADE PYELOGRAPHY, AND INSERTION OF STENT Right 3/30/2021    Procedure: CYSTOSCOPY, WITH RETROGRADE PYELOGRAM AND URETERAL STENT INSERTION;  Surgeon: Damari Barber Jr., MD;  Location: [facility] OR;  Service: Urology;  Laterality: Right;    CYSTOURETEROSCOPY WITH RETROGRADE PYELOGRAPHY AND INSERTION OF STENT INTO URETER Right 1/18/2022    Procedure: CYSTOURETEROSCOPY, WITH RETROGRADE PYELOGRAM AND URETERAL STENT INSERTION;  Surgeon: Damari Barber Jr., MD;  Location: [facility] OR;  Service: Urology;  Laterality: Right;    DIAGNOSTIC LAPAROSCOPY N/A 2/13/2020    Procedure: LAPAROSCOPY, DIAGNOSTIC;  Surgeon: Robbi Lovell III, MD;  Location: [facility] OR;  Service: General;  Laterality: N/A;    HYSTERECTOMY  1980    ILEOSTOMY N/A 2/13/2020     Procedure: CREATION, ILEOSTOMY Loop;  Surgeon: Robbi Lovell III, MD;  Location: [facility] OR;  Service: General;  Laterality: N/A;    ILEOSTOMY CLOSURE N/A 5/18/2020    Procedure: CLOSURE, ILEOSTOMY;  Surgeon: Robbi Lovell III, MD;  Location: [facility] OR;  Service: General;  Laterality: N/A;    LASER LITHOTRIPSY Right 3/30/2021    Procedure: LITHOTRIPSY, USING LASER;  Surgeon: Damari Barber Jr., MD;  Location: [facility] OR;  Service: Urology;  Laterality: Right;    LUMBAR LAMINECTOMY      LYSIS OF ADHESIONS N/A 2/13/2020    Procedure: LYSIS, ADHESIONS;  Surgeon: Robbi Lovell III, MD;  Location: [facility] OR;  Service: General;  Laterality: N/A;    NECK SURGERY      PHLEBOGRAPHY  2/28/2020    Procedure: VENOGRAM;  Surgeon: Robbi Lovell III, MD;  Location: [facility] OR;  Service: General;;    REMOVAL OF VASCULAR ACCESS PORT Right 2/13/2020    Procedure: REMOVAL, VASCULAR ACCESS PORT;  Surgeon: Robbi Lovell III, MD;  Location: [facility] OR;  Service: General;  Laterality: Right;    RETROGRADE PYELOGRAPHY  3/16/2021    Procedure: PYELOGRAM, RETROGRADE;  Surgeon: Damari Barber Jr., MD;  Location: [facility];  Service: Urology;;    RETROGRADE PYELOGRAPHY Right 5/18/2021    Procedure: PYELOGRAM, RETROGRADE;  Surgeon: Damari Barber Jr., MD;  Location: [facility] OR;  Service: Urology;  Laterality: Right;    SHOULDER SURGERY      URETEROSCOPIC REMOVAL OF URETERIC CALCULUS Right 3/30/2021    Procedure: REMOVAL, CALCULUS, URETER, URETEROSCOPIC;  Surgeon: Damari Barber Jr., MD;  Location: [facility] OR;  Service: Urology;  Laterality: Right;    URETEROSCOPIC REMOVAL OF URETERIC CALCULUS Right 4/20/2021    Procedure: REMOVAL, CALCULUS, URETER, URETEROSCOPIC;  Surgeon: Damari Barber Jr., MD;  Location: [facility] OR;  Service: Urology;  Laterality: Right;    vulva cancer       Family History   Problem Relation Age of Onset    Heart disease Mother     Hypertension Mother     Cancer Mother         lung    Heart disease Father      Heart disease Sister         Open heart surgery    No Known Problems Daughter      Social History     Tobacco Use    Smoking status: Former Smoker     Packs/day: 1.00     Years: 33.00     Pack years: 33.00     Types: Cigarettes     Quit date: 2000     Years since quittin.0    Smokeless tobacco: Former User     Quit date:    Substance Use Topics    Alcohol use: No    Drug use: No       Review of patient's allergies indicates:   Allergen Reactions    Ciprofloxacin Other (See Comments)     Elevated liver enzymes      Pcn [penicillins] Anaphylaxis     TOLERATES ROCEPHIN WITHOUT DIFFICULTY       Outpatient meds:  Current Facility-Administered Medications on File Prior to Encounter   Medication Dose Route Frequency Provider Last Rate Last Admin    electrolyte-S (ISOLYTE)   Intravenous Continuous Raz Ruff MD        lactated ringers infusion   Intravenous Continuous Ruy Beltre MD        LIDOcaine (PF) 10 mg/ml (1%) injection 5 mg  0.5 mL Intradermal Once Raz Ruff MD         Current Outpatient Medications on File Prior to Encounter   Medication Sig Dispense Refill    CHOLESTYRAMINE LIGHT 4 gram packet Take 1 packet by mouth 2 (two) times daily.      cyclobenzaprine (FLEXERIL) 10 MG tablet Take 1 tablet (10 mg total) by mouth 3 (three) times daily as needed for Muscle spasms. 30 tablet 1    diltiaZEM (CARDIZEM CD) 120 MG Cp24 Take 1 capsule (120 mg total) by mouth once daily. 90 capsule 0    diphenhydrAMINE (BENADRYL) 25 mg capsule Take 25 mg by mouth every 12 (twelve) hours as needed for Itching.      gabapentin (NEURONTIN) 100 MG capsule Take 100 mg by mouth nightly as needed. HCS      levothyroxine (SYNTHROID) 88 MCG tablet Take 1 tablet (88 mcg total) by mouth before breakfast. 90 tablet 0    promethazine (PHENERGAN) 25 MG tablet TAKE 1 TABLET(25 MG) BY MOUTH TWICE DAILY AS NEEDED FOR NAUSEA (Patient taking differently: Take 25 mg by mouth 2 (two) times daily as  needed for Nausea.) 60 tablet 0    sertraline (ZOLOFT) 50 MG tablet Take 50 mg by mouth every evening.      doxycycline (VIBRAMYCIN) 100 MG Cap Take 100 mg by mouth every 12 (twelve) hours. HCS      ferric citrate (AURYXIA) 210 mg iron Tab Take 2 tablets by mouth 3 (three) times daily with meals.      traMADoL (ULTRAM) 50 mg tablet Take 1 tablet (50 mg total) by mouth every 8 (eight) hours as needed for Pain. 40 tablet 0       Scheduled meds:   cefTRIAXone (ROCEPHIN) IVPB  1 g Intravenous Q24H    cholestyramine-aspartame  1 packet Oral BID    diltiaZEM  120 mg Oral Daily    enoxaparin  30 mg Subcutaneous Daily    ferric citrate  2 tablet Oral TID WM    fluconazole  100 mg Oral QHS    levothyroxine  88 mcg Oral Before breakfast    LIDOcaine-prilocaine   Topical (Top) Once    miconazole   Topical (Top) BID    mupirocin   Nasal BID    oxyCODONE        sertraline  50 mg Oral QHS       Infusions:      PRN meds:  acetaminophen, cyclobenzaprine, diphenhydrAMINE, gabapentin, morphine, ondansetron, ondansetron, oxyCODONE, oxyCODONE-acetaminophen, sodium chloride 0.9%, sodium chloride 0.9%, vancomycin - pharmacy to dose    Review of Systems:  Review of Systems   Constitutional: Negative for chills, fever, malaise/fatigue and weight loss.   HENT: Negative for hearing loss and nosebleeds.    Eyes: Negative for blurred vision, double vision and photophobia.   Respiratory: Negative for cough, shortness of breath and wheezing.    Cardiovascular: Negative for chest pain, palpitations and leg swelling.   Gastrointestinal: Negative for abdominal pain, constipation, diarrhea, heartburn, nausea and vomiting.   Genitourinary: Negative for dysuria, frequency and urgency.   Musculoskeletal: Negative for falls, joint pain and myalgias.   Skin: Negative for itching and rash.   Neurological: Negative for dizziness, speech change, focal weakness, loss of consciousness and headaches.   Endo/Heme/Allergies: Does not bruise/bleed  easily.   Psychiatric/Behavioral: Negative for depression and substance abuse. The patient is not nervous/anxious.      OBJECTIVE:     Vital Signs and IO (Last 24H):  Temp:  [97.1 °F (36.2 °C)-101.1 °F (38.4 °C)]   Pulse:  [64-78]   Resp:  [11-20]   BP: (114-169)/(55-70)   SpO2:  [91 %-100 %]   No intake/output data recorded.    Wt Readings from Last 5 Encounters:   01/18/22 68.3 kg (150 lb 9.2 oz)   09/15/21 63 kg (139 lb)   05/24/21 63 kg (139 lb)   05/19/21 66.7 kg (147 lb 0.8 oz)   05/13/21 63.5 kg (139 lb 15.9 oz)     Physical Exam:  Physical Exam  Constitutional:       Appearance: She is well-developed. She is not diaphoretic.   HENT:      Head: Normocephalic and atraumatic.   Eyes:      General: No scleral icterus.     Pupils: Pupils are equal, round, and reactive to light.   Cardiovascular:      Rate and Rhythm: Normal rate and regular rhythm.   Pulmonary:      Effort: Pulmonary effort is normal. No respiratory distress.      Breath sounds: No stridor.   Abdominal:      General: There is no distension.      Palpations: Abdomen is soft.   Musculoskeletal:         General: No deformity. Normal range of motion.      Cervical back: Neck supple.   Skin:     General: Skin is warm and dry.      Findings: No erythema or rash.   Neurological:      Mental Status: She is alert and oriented to person, place, and time.      Cranial Nerves: No cranial nerve deficit.   Psychiatric:         Behavior: Behavior normal.       Body mass index is 23.24 kg/m².    Laboratory:  Recent Labs   Lab 01/16/22  1115 01/17/22  0438 01/18/22  0412    136 135*   K 4.0 4.7 4.4    104 104   CO2 23 21* 20*   BUN 29* 32* 36*   CREATININE 5.7* 6.1* 6.7*   ESTGFRAFRICA 8.0* 7.4* 6.6*   EGFRNONAA 6.9* 6.4* 5.7*   GLU 95 67* 79       Recent Labs   Lab 01/16/22  1115 01/17/22  0438 01/18/22  0412   CALCIUM 8.3* 8.0* 8.2*   ALBUMIN 3.1*  --   --    PHOS  --  6.4* 6.6*   MG 1.6 1.5* 1.9       Recent Labs   Lab 09/30/19  0820 11/15/19  0748    PTH, Intact 176.4 H 238.4 H       No results for input(s): POCTGLUCOSE in the last 168 hours.    Recent Labs   Lab 11/15/19  0748 01/22/20  0848 05/25/21  0847   Hemoglobin A1C Invalid A SEE COMMENT 4.6       Recent Labs   Lab 01/16/22  1115 01/17/22  0438 01/18/22  0412   WBC 7.12 7.64 5.53   HGB 9.4* 9.6* 9.7*   HCT 29.7* 30.9* 30.7*   * 145* 128*   * 120* 118*   MCHC 31.6* 31.1* 31.6*   MONO 5.1  0.4  --   --        Recent Labs   Lab 01/16/22  1115   BILITOT 0.8   PROT 6.0   ALBUMIN 3.1*   ALKPHOS 98   ALT 10   AST 12       Recent Labs   Lab 12/09/20  2323 12/09/20  2323 03/15/21  1533 05/17/21  1819 01/18/22  0331   Color, UA Yellow   < > Yellow Yellow Yellow   Appearance, UA Cloudy A   < > Cloudy A Hazy A Clear   pH, UA 6.0   < > 7.0 8.0 7.0   Specific [place], UA 1.020   < > 1.015 1.015 1.010   Protein, UA 1+ A   < > 2+ A 2+ A Trace A   Glucose, UA Negative   < > Negative Negative Negative   Ketones, UA Negative   < > Negative Negative Negative   Urobilinogen, UA Negative   < > Negative Negative Negative   Bilirubin (UA) Negative   < > Negative Negative Negative   Occult Blood UA 3+ A   < > 2+ A 2+ A 2+ A   Nitrite, UA Negative   < > Negative Negative Negative   RBC, UA 1   < > 21 H 7 H 10 H   WBC, UA >100 H   < > >100 H 80 H 50 H   Bacteria Negative  --  Negative Few A  --    Hyaline Casts, UA 10 A  --  40 A 0  --     < > = values in this interval not displayed.       Recent Labs   Lab 03/16/20  1551 12/08/20 2024   POC PH 7.402 7.370   POC PCO2 39.9 39.0   POC HCO3 24.9 22.5 L   POC PO2 58 LL 85   POC SATURATED O2 90 L 96   POC BE 0 -3   Sample ARTERIAL ARTERIAL       Microbiology Results (last 7 days)     Procedure Component Value Units Date/Time    Blood culture #2 **CANNOT BE ORDERED STAT** [[id number]] Collected: 01/16/22 1130    Order Status: Completed Specimen: Blood from Peripheral, Antecubital, Right Updated: 01/18/22 1432     Blood Culture, Routine No Growth to date      No Growth  to date      No Growth to date    Blood culture #1 **CANNOT BE ORDERED STAT** [[id number]] Collected: 01/16/22 1115    Order Status: Completed Specimen: Blood from Peripheral, Antecubital, Right Updated: 01/18/22 1232     Blood Culture, Routine No Growth to date      No Growth to date      No Growth to date    Urine culture [[id number]] Collected: 01/18/22 0331    Order Status: Completed Specimen: Urine Updated: 01/18/22 0650     Urine Culture, Routine No growth to date    Narrative:      Specimen Source->Urine        ASSESSMENT/PLAN:     ESRD on HD MWF via AVF  Continue current dialysis prescription.  Next HD per schedule.  Renal diet - low K, low phos.  No IVs or BP checks on access and/or non-dominant arm.  Cancel vascular consult    Anemia of CKD  Hgb and HCT are acceptable. Monitor.  Will provide ADAM and/or IV iron PRN.    MBD / Secondary HPT  Ca, phos, PTH and vitamin D levels are acceptable.   Phos binders, vitamin D analogues and calcimimetics as needed.    HTN  BP seem controlled.   Tolerate asymptomatic HTN up to -160.  Continue home meds.  Low sodium diet.    Thank you for allowing us to participate in the care of your patient!   We will follow the patient and provide recommendations as needed.    Patient care time was spent personally by me on the following activities:   · Obtaining a history.  · Examination of patient.  · Providing medical care at the patients bedside.  · Developing a treatment plan with patient or surrogate and bedside caregivers.  · Ordering and reviewing laboratory studies, radiographic studies, pulse oximetry.  · Ordering and performing treatments and interventions.  · Evaluation of patient's response to treatment.  · Discussions with consultants while on the unit and immediately available to the patient.  · Re-evaluation of the patient's condition.  · Documentation in the medical record.     Raúl Dietz MD    [facility]  [address] [place] [address] [place]  [id number]    [phone] - tel  [phone] - fax    1/18/2022

## 2022-01-18 NOTE — OP NOTE
[facility]  Operative Note    Date: 01/18/2022    Pre-Op Diagnosis: Right hydroureteronephrosis, nephrolithiasis, history of ureteral stricture    Post-Op Diagnosis: Same    Procedure(s) Performed:   1. Cystourethroscopy  2. Right retrograde pyelogram  3. Right ureteral stent exchange, 7 x 24 JJ stent      Fluoro < 1 hour    Specimen(s): None    Staff Surgeon: Damari Barber MD    Assistant Surgeon: Damari Barber Jr, MD    Anesthesia: General    Indications: Shara Hutchins is a 71 y.o. female with a history of right nephrolithiasis complicated by recurrent right pyelonephritis, right ureteral stricture and right hydronephrosis. Plan for stent upsizing and exchange today. Remains on dialysis, but still makes urine.    Findings: Moderate to severe right hydroureteronephrosis. Stent upsized today. No evidence of urinary tract infection    Estimated Blood Loss: Minimal    Drains: 7 x 24 cm J stent    Procedure in Detail:  After risks, benefits and possible complications of cystoscopy were explained, the patient elected to undergo the procedure and informed consent was obtained. All questions were answered in the marek-operative area. The patient was transferred to the cystoscopy suite and placed in the supine position.  SCDs were applied and working.  General anesthesia was administered.  Once the patient was adequately sedated, she was placed in the dorsal lithotomy position and prepped and draped in the usual sterile fashion.  Time out was performed, marek-procedural antibiotics were confirmed.     A rigid cystoscope in a 22 Fr sheath was introduced into the bladder per urethra. This passed easily.  The entire urethra was visualized which showed no masses or strictures.  The right and left ureteral orifices were identified in the normal anatomic position and were seen effluxing clear urine.  Formal cystoscopy was performed which revealed no masses or lesions suspicious for malignancy, no trabeculations, no bladder  stones and no bladder diverticuli.     The right ureter was then cannulated with a 5F open ended catheter. The scope was then used to remove the indwelling stent using the stent graspers. The right ureter was then cannulated again and a retrograde pyelogram performed revealing moderate to severe hydronephrosis. The sensor wire was placed into the right kidney and confirmed on fluoroscopy. The 24 cm stent was placed over the wire into the ureter. The pusher was used to advance the stent. Once in the appropriate position the wire was removed and there was a proximal and distal curl confirmed on fluoroscopy. The bladder was drained, and the patient was removed from lithotomy.      The patient tolerated the procedure well and was transferred to recovery in stable condition.      Disposition:  The patient will follow up with me in 6 months for stent exchange. Could benefit from renal ultrasound in 6 weeks to assess for resolution of her hydro. Recommend continuing with a chronic stent given his history of recurrent pyelonephritis. Has been free of urinary tract infection since chronic stent placement.         Damari Barber Jr, MD

## 2022-01-18 NOTE — CONSULTS
[facility] Established Patient/H&P:    Shara Hutchins is a 71 y.o. female who presents for recurrent right pyelonephritis.     Patient with a history of HTN, vulvar cancer s/p chemo/XRT in 2013 and CKD who presented to the [facility] emergency department on 2/23/20 with abdominal pain, vaginal bleeding, DARCI  (3.8 from 2.5 baseline) and a 20 pound weight loss. Lee was placed on admission to monitor UOP. CTRSS 2/12 2/23 showed bilateral nonobstructing renal stones.      She underwent loop ileostomy on 2/13/20 for chronic diarrhea. Renal ultrasound on 2/26 showed no hydro, b renal stones and indeterminate artifact. She went to [facility] on 3/7 and returned and stated gross hematuria started after this. Prior to admission pt denied having gross hematuria, urinary frequency, vaginal bleeding or frequent uti's. She did however state that her vulvar itching had returned and she was concerned about recurrence of cancer with no recent f/u by gyn onc. She hasd seen by  inpt and plan was for outpt f/u with .      She had been awaiting LTAC placement on 3/13. Consulted initially for persistent hematuria. The plan was for outpt cysto to eval for any bladder path not seen on ct and discussion of renal stones as well as discharging with lee. Her hematuria continued, ucx on 3/15 grew Klebsiella and she was started on rocephin and completed a 5d course. Around 3/17  she was given a voiding trial vs lee exchange? on 3/17 for larger 18fr.       She continued to have hematuria requiring intermittent irrigation and 2 transfusions. Urology was re-consulted 3/24, Dr. Garcia asked them to remove the 18 Fr, place a 22 [place]. CBI weaned off. She underwent ileostomy reversal on 5/18/20. Discharged from the hospital on 5/23/20.        Interval History     6/10/20: Patient presents today with hematuria for one day. She was prescribed Pyridium and Macrodantin for presumed UTI.       6/16/20:  Patient presented today [facility] ED on 6/14/20 with worsening gross hematuria. Urology consulted to assist with management. States she became light-headed and dizzy at home due to anemia.      6/25/20: She is s/p cystoscopy with fulguration on 6/16/20. Placed on CBI overnight for monitoring. Urine remained clear and patient discharged from [facility]. She presents today with her lee in place and clear urine.Here for voiding trial.      3/16/21: Patient presented to [facility] ED on 3/15/21 complaining of a 2 days history of right flank pain associated with chills and nausea. CT abdomen/pelvis without contrast revealed several distal right UVJ stones - largest measuring 8 mm with associated moderate right hydroureteronephrosis. Also with bilateral non-obstructing nephrolithiasis. Urine culture with gram negative rods.      4/20/21: She is s/p right ureteroscopy with laser lithotripsy and stent exchange on 3/30/21. Several impacted stones identified and lasered. Second look-ureteroscopy today.      5/13/21: She underwent second look ureteroscopy on 4/20/21. Stent removed in clinic today at bedside. Patient with chronic diarrhea. Stones are calcium oxalate monohydrate.      5/18/21: Patient presented to the ED yesterday with fever, chills and recurrent right flank pain after stent removal. CT renal stone with recurrent hydroureteronephrosis. Underwent right urgent stent placement and removal of several bladder stones. Known right ureteral stricture. Planned for follow up with stent exchange at a later date, but did not follow up.     1/18/22: Patient admitted at [facility] for vulvar cellulitis. CT abdomen pelvis revealed severe right hydroureteronephrosis with the stent in place and a 6 mm mid-ureteral stone. Urology consulted to assist with management. No fever or chills. Urine culture negative. Remains on dialysis.      She denies any bone pain, weight loss or  trauma.        Urine culture  No growth  1/18/22  Multi  orgs                    4/13/21  GNRs                          3/15/21  E. Coli                          12/8/20  Multiple organisms      12/9/20  No growth                    6/16/20  Enterobacter               6/14/20  E. Coli                          6/3/20  Enterococcus              5/21/20  Pseudomonas             3/24/20                 Urine cytology  Negative                      6/16/20       Past Medical History:   Diagnosis Date    Cellulitis of trunk     Perineum, numerous episodes    Chronic diarrhea 02/10/2020    Short-gut syndrome    Diabetes mellitus, type 2     pt has not had for years after losing weight    End stage renal disease on dialysis 01/07/2018    Fatigue     Hypertension     Hypothyroidism 2/10/2020    Iron deficiency anemia due to chronic blood loss 1/7/2018    Pulmonary hypertension 3/20/2020    Vulvar cancer 1/7/2019       Past Surgical History:   Procedure Laterality Date    ABDOMINAL SURGERY      APPENDECTOMY      AV FISTULA PLACEMENT Left 2018    BACK SURGERY      BLADDER FULGURATION N/A 6/16/2020    Procedure: FULGURATION, BLADDER;  Surgeon: Damari Barber Jr., MD;  Location: [facility];  Service: Urology;  Laterality: N/A;    carpel tunnel surgery once on each hand      CERVICAL FUSION      x 4    cervix repair      CHOLECYSTECTOMY  2000    COLECTOMY      CYSTOSCOPY W/ RETROGRADES  6/16/2020    Procedure: CYSTOSCOPY, WITH RETROGRADE PYELOGRAM;  Surgeon: Damari Barber Jr., MD;  Location: [facility] OR;  Service: Urology;;    CYSTOSCOPY W/ RETROGRADES Right 4/20/2021    Procedure: CYSTOSCOPY, WITH RETROGRADE PYELOGRAM;  Surgeon: Damari Barber Jr., MD;  Location: [facility] OR;  Service: Urology;  Laterality: Right;    CYSTOSCOPY W/ URETERAL STENT PLACEMENT Right 3/16/2021    Procedure: CYSTOSCOPY, WITH URETERAL STENT INSERTION;  Surgeon: Damari Barber Jr., MD;  Location: [facility] OR;  Service: Urology;  Laterality: Right;    CYSTOSCOPY W/ URETERAL STENT PLACEMENT Right  5/18/2021    Procedure: CYSTOSCOPY, WITH URETERAL STENT INSERTION;  Surgeon: Damari Barber Jr., MD;  Location: [facility] OR;  Service: Urology;  Laterality: Right;    CYSTOSCOPY W/ URETERAL STENT REMOVAL Right 4/20/2021    Procedure: CYSTOSCOPY, WITH URETERAL STENT REMOVAL;  Surgeon: Damari Barber Jr., MD;  Location: [facility] OR;  Service: Urology;  Laterality: Right;    CYSTOSCOPY WITH URETEROSCOPY, RETROGRADE PYELOGRAPHY, AND INSERTION OF STENT Right 3/30/2021    Procedure: CYSTOSCOPY, WITH RETROGRADE PYELOGRAM AND URETERAL STENT INSERTION;  Surgeon: Damari Barber Jr., MD;  Location: [facility] OR;  Service: Urology;  Laterality: Right;    DIAGNOSTIC LAPAROSCOPY N/A 2/13/2020    Procedure: LAPAROSCOPY, DIAGNOSTIC;  Surgeon: Robbi Lovell III, MD;  Location: [facility] OR;  Service: General;  Laterality: N/A;    HYSTERECTOMY  1980    ILEOSTOMY N/A 2/13/2020    Procedure: CREATION, ILEOSTOMY Loop;  Surgeon: Robbi Lovell III, MD;  Location: [facility] OR;  Service: General;  Laterality: N/A;    ILEOSTOMY CLOSURE N/A 5/18/2020    Procedure: CLOSURE, ILEOSTOMY;  Surgeon: Robbi Lovell III, MD;  Location: [facility] OR;  Service: General;  Laterality: N/A;    LASER LITHOTRIPSY Right 3/30/2021    Procedure: LITHOTRIPSY, USING LASER;  Surgeon: Damari Barber Jr., MD;  Location: [facility] OR;  Service: Urology;  Laterality: Right;    LUMBAR LAMINECTOMY      LYSIS OF ADHESIONS N/A 2/13/2020    Procedure: LYSIS, ADHESIONS;  Surgeon: Robbi Lovell III, MD;  Location: [facility] OR;  Service: General;  Laterality: N/A;    NECK SURGERY      PHLEBOGRAPHY  2/28/2020    Procedure: VENOGRAM;  Surgeon: Robbi Lovell III, MD;  Location: [facility] OR;  Service: General;;    REMOVAL OF VASCULAR ACCESS PORT Right 2/13/2020    Procedure: REMOVAL, VASCULAR ACCESS PORT;  Surgeon: Robbi Lovell III, MD;  Location: [facility] OR;  Service: General;  Laterality: Right;    RETROGRADE PYELOGRAPHY  3/16/2021    Procedure: PYELOGRAM, RETROGRADE;   Surgeon: Damari Barber Jr., MD;  Location: [facility] OR;  Service: Urology;;    RETROGRADE PYELOGRAPHY Right 5/18/2021    Procedure: PYELOGRAM, RETROGRADE;  Surgeon: Damari Barber Jr., MD;  Location: [facility] OR;  Service: Urology;  Laterality: Right;    SHOULDER SURGERY      URETEROSCOPIC REMOVAL OF URETERIC CALCULUS Right 3/30/2021    Procedure: REMOVAL, CALCULUS, URETER, URETEROSCOPIC;  Surgeon: Damari Barber Jr., MD;  Location: [facility] OR;  Service: Urology;  Laterality: Right;    URETEROSCOPIC REMOVAL OF URETERIC CALCULUS Right 4/20/2021    Procedure: REMOVAL, CALCULUS, URETER, URETEROSCOPIC;  Surgeon: Damari Barber Jr., MD;  Location: [facility] OR;  Service: Urology;  Laterality: Right;    vulva cancer         Review of patient's allergies indicates:   Allergen Reactions    Ciprofloxacin Other (See Comments)     Elevated liver enzymes      Pcn [penicillins] Anaphylaxis     TOLERATES ROCEPHIN WITHOUT DIFFICULTY       Medications Reviewed: see MAR      FOCUSED PHYSICAL EXAM:    Vitals:    01/18/22 0701   BP: 137/64   Pulse: 73   Resp: 16   Temp: 98.1 °F (36.7 °C)     Body mass index is 23.24 kg/m².       General: Alert, cooperative, no distress, appears stated age  Abdomen: Soft, non-tender, no CVA tenderness, non-distended      LABS:    Lab Results   Component Value Date    WBC 5.53 01/18/2022    HGB 9.7 (L) 01/18/2022    HCT 30.7 (L) 01/18/2022     (H) 01/18/2022     (L) 01/18/2022       BMP  Lab Results   Component Value Date     (L) 01/18/2022    K 4.4 01/18/2022     01/18/2022    CO2 20 (L) 01/18/2022    BUN 36 (H) 01/18/2022    CREATININE 6.7 (H) 01/18/2022    CALCIUM 8.2 (L) 01/18/2022    ANIONGAP 11 01/18/2022    ESTGFRAFRICA 6.6 (A) 01/18/2022    EGFRNONAA 5.7 (A) 01/18/2022             Assessment/Diagnosis:    1. Right severe hydroureteronephrosis  2. Chronic indwelling stent  3. Bilateral nephrolithiasis    Plans:    - Discussed the etiology and management of the patient's  nephrolithiasis. Explained that stone disease is multifactorial and can be secondary to urinary obstruction, urine composition, low urine volume, diet, hypokalemia, DM, hypertension, gout, RTA, UTI and medications. We discussed that stones can be managed with observation, trial of passage, ureteroscopy with laser lithotripsy, ESWL and PCNL. After extensive discussion, patient has decided to proceed with right ureteral stent exchange. All risks, benefits and alternatives discussed at length with patient.

## 2022-01-18 NOTE — TRANSFER OF CARE
"Anesthesia Transfer of Care Note    Patient: Shara Hutchins    Procedure(s) Performed: Procedure(s) (LRB):  CYSTOURETEROSCOPY, WITH RETROGRADE PYELOGRAM AND URETERAL STENT INSERTION (Right)    Patient location: PACU    Anesthesia Type: general    Transport from OR: Transported from OR on room air with adequate spontaneous ventilation    Post pain: adequate analgesia    Post assessment: no apparent anesthetic complications    Post vital signs: stable    Level of consciousness: awake    Nausea/Vomiting: no nausea/vomiting    Complications: none    Transfer of care protocol was followed      Last vitals:   Visit Vitals  /64 (BP Location: Right arm, Patient Position: Lying)   Pulse 73   Temp 36.7 °C (98.1 °F) (Oral)   Resp 16   Ht 5' 7.5" (1.715 m)   Wt 68.3 kg (150 lb 9.2 oz)   SpO2 (!) 92%   BMI 23.24 kg/m²     "

## 2022-01-18 NOTE — PROGRESS NOTES
"VANCOMYCIN PHARMACOKINETIC NOTE:  Vancomycin Day # 2    Objective:    71 y.o., female, Actual Body Weight = 67.3 kg (148 lb 5.9 oz)    Diagnosis/Indication for Vancomycin:  Skin & Soft Tissue Infection   Desired Vancomycin Peak:  30-35 mcg/ml; Desired Trough: 10-15 mcg/ml    Current Vancomycin Regimen:  1000 MG INTERMITTENT DOSiING    Receiving other antibiotics:    Antibiotics (From admission, onward)                Start     Stop Route Frequency Ordered    01/17/22 2330  vancomycin in dextrose 5 % 1 gram/250 mL IVPB 1,000 mg        Note to Pharmacy: Ht: 5' 7.5" (1.715 m)  Wt: 67.3 kg (148 lb 5.9 oz)  Estimated Creatinine Clearance: 8.4 mL/min (A) (based on SCr of 6.1 mg/dL (H)).  Body mass index is 22.89 kg/m².    -- IV Once 01/17/22 2227    01/17/22 1400  cefTRIAXone (ROCEPHIN) 1 g/50 mL D5W IVPB        Question:  Is the patient competent?  Answer:  Yes    -- IV Every 24 hours (non-standard times) 01/17/22 1246    01/16/22 2100  mupirocin 2 % ointment         01/21 2059 Nasl 2 times daily 01/16/22 1629    01/16/22 1717  vancomycin - pharmacy to dose         -- IV pharmacy to manage frequency 01/16/22 1618             The patient has the following labs:     1/17/2022 Estimated Creatinine Clearance: 8.4 mL/min (A) (based on SCr of 6.1 mg/dL (H)). Lab Results   Component Value Date    BUN 32 (H) 01/17/2022     Lab Results   Component Value Date    WBC 7.64 01/17/2022        Patient receives hemodialysis , on Mondays, Wednesdays, and Fridays    Random vancomycin:  8.1  mcg/mL collected post dialysis on 1/17 at 20;41    Microbiology Results (last 7 days)       Procedure Component Value Units Date/Time    Blood culture #2 **CANNOT BE ORDERED STAT** [[id number]] Collected: 01/16/22 1130    Order Status: Completed Specimen: Blood from Peripheral, Antecubital, Right Updated: 01/17/22 1432     Blood Culture, Routine No Growth to date      No Growth to date    Blood culture #1 **CANNOT BE ORDERED STAT** [[id number]] " Collected: 01/16/22 1115    Order Status: Completed Specimen: Blood from Peripheral, Antecubital, Right Updated: 01/17/22 1232     Blood Culture, Routine No Growth to date      No Growth to date             Assessment:  Vancomycin dose =  14.8  mg/kg  Renal function is: dependent on dialysis.  Vancomycin elimination is dependent on dialysis.    Plan:    Will schedule next dose for 1/17  at 23;30,  Will obtain random vancomycin level with next hemodialysis    Pharmacy will continue to manage vancomycin therapy and adjust regimen as necessary.    Thank you for allowing us to participate in this patient's care.     Chantel Garcia 1/17/2022 10:31 PM  Department of Pharmacy  Ext [phone]

## 2022-01-18 NOTE — ANESTHESIA PREPROCEDURE EVALUATION
01/18/2022  Shara Hutchins is a 71 y.o., female.  Patient Active Problem List   Diagnosis    Inflammatory bowel disease    Vitamin D deficiency    Hisotry of Vulvar cancer    Anemia of decreased vitamin B12 absorption    Anemia due to stage 5 chronic kidney disease treated with erythropoietin    Hypothyroidism    Anemia of chronic disease    Nausea & vomiting    Chronic diarrhea    S/P ileostomy    DARCI (acute kidney injury)    Severe dehydration due to chronic diarrhea/high output ileostomy    Electrolyte imbalance    Severe malnutrition    History of difficult venous access    Unstageable pressure injury of skin and tissue    Malnutrition    Acute hypoxemic respiratory failure    Pulmonary hypertension    Status post reversal of ileostomy    Mild protein-energy malnutrition    Stage 4 chronic kidney disease    Perineal rash, female    Perianal cellulitis    Pneumonia due to infectious organism    Right hydroureter secondary to obstructing calculi    Right-sided pyelonephritis    ESRD on dialysis MWF    Macrocytic anemia    Constipation    Bilateral LE neuropathy    Essential hypertension    Nephrolithiasis    Sepsis    Hypomagnesemia    Acute on chronic anemia    Vulvar cellulitis    Lymphedema due to radiation    Urinary incontinence    S/P ureteral stent placement    Thrombocytopenia                                                                                                                  01/18/2022  Shara Hutchins is a 71 y.o., female   Patient Active Problem List   Diagnosis    Inflammatory bowel disease    Vitamin D deficiency    Hisotry of Vulvar cancer    Anemia of decreased vitamin B12 absorption    Anemia due to stage 5 chronic kidney disease treated with erythropoietin    Hypothyroidism    Anemia of chronic disease    Nausea & vomiting    Chronic  diarrhea    S/P ileostomy    DARCI (acute kidney injury)    Severe dehydration due to chronic diarrhea/high output ileostomy    Electrolyte imbalance    Severe malnutrition    History of difficult venous access    Unstageable pressure injury of skin and tissue    Malnutrition    Acute hypoxemic respiratory failure    Pulmonary hypertension    Status post reversal of ileostomy    Mild protein-energy malnutrition    Stage 4 chronic kidney disease    Perineal rash, female    Perianal cellulitis    Pneumonia due to infectious organism    Right hydroureter secondary to obstructing calculi    Right-sided pyelonephritis    ESRD on dialysis MWF    Macrocytic anemia    Constipation    Bilateral LE neuropathy    Essential hypertension    Nephrolithiasis    Sepsis    Hypomagnesemia    Acute on chronic anemia    Vulvar cellulitis    Lymphedema due to radiation    Urinary incontinence    S/P ureteral stent placement    Thrombocytopenia       Past Surgical History:   Procedure Laterality Date    ABDOMINAL SURGERY      APPENDECTOMY      AV FISTULA PLACEMENT Left 2018    BACK SURGERY      BLADDER FULGURATION N/A 6/16/2020    Procedure: FULGURATION, BLADDER;  Surgeon: Damari Barber Jr., MD;  Location: [facility];  Service: Urology;  Laterality: N/A;    carpel tunnel surgery once on each hand      CERVICAL FUSION      x 4    cervix repair      CHOLECYSTECTOMY  2000    COLECTOMY      CYSTOSCOPY W/ RETROGRADES  6/16/2020    Procedure: CYSTOSCOPY, WITH RETROGRADE PYELOGRAM;  Surgeon: Damari Barber Jr., MD;  Location: [facility] OR;  Service: Urology;;    CYSTOSCOPY W/ RETROGRADES Right 4/20/2021    Procedure: CYSTOSCOPY, WITH RETROGRADE PYELOGRAM;  Surgeon: Damari Barber Jr., MD;  Location: [facility] OR;  Service: Urology;  Laterality: Right;    CYSTOSCOPY W/ URETERAL STENT PLACEMENT Right 3/16/2021    Procedure: CYSTOSCOPY, WITH URETERAL STENT INSERTION;  Surgeon: Damari Barber Jr., MD;  Location: [facility]  OR;  Service: Urology;  Laterality: Right;    CYSTOSCOPY W/ URETERAL STENT PLACEMENT Right 5/18/2021    Procedure: CYSTOSCOPY, WITH URETERAL STENT INSERTION;  Surgeon: Damari Barber Jr., MD;  Location: [facility] OR;  Service: Urology;  Laterality: Right;    CYSTOSCOPY W/ URETERAL STENT REMOVAL Right 4/20/2021    Procedure: CYSTOSCOPY, WITH URETERAL STENT REMOVAL;  Surgeon: Damari Barber Jr., MD;  Location: [facility] OR;  Service: Urology;  Laterality: Right;    CYSTOSCOPY WITH URETEROSCOPY, RETROGRADE PYELOGRAPHY, AND INSERTION OF STENT Right 3/30/2021    Procedure: CYSTOSCOPY, WITH RETROGRADE PYELOGRAM AND URETERAL STENT INSERTION;  Surgeon: Damari Barber Jr., MD;  Location: [facility] OR;  Service: Urology;  Laterality: Right;    DIAGNOSTIC LAPAROSCOPY N/A 2/13/2020    Procedure: LAPAROSCOPY, DIAGNOSTIC;  Surgeon: Robbi Lovell III, MD;  Location: [facility] OR;  Service: General;  Laterality: N/A;    HYSTERECTOMY  1980    ILEOSTOMY N/A 2/13/2020    Procedure: CREATION, ILEOSTOMY Loop;  Surgeon: Robbi Lovell III, MD;  Location: [facility] OR;  Service: General;  Laterality: N/A;    ILEOSTOMY CLOSURE N/A 5/18/2020    Procedure: CLOSURE, ILEOSTOMY;  Surgeon: Robbi Lovell III, MD;  Location: [facility] OR;  Service: General;  Laterality: N/A;    LASER LITHOTRIPSY Right 3/30/2021    Procedure: LITHOTRIPSY, USING LASER;  Surgeon: Damari Barber Jr., MD;  Location: [facility] OR;  Service: Urology;  Laterality: Right;    LUMBAR LAMINECTOMY      LYSIS OF ADHESIONS N/A 2/13/2020    Procedure: LYSIS, ADHESIONS;  Surgeon: Robbi Lovell III, MD;  Location: [facility] OR;  Service: General;  Laterality: N/A;    NECK SURGERY      PHLEBOGRAPHY  2/28/2020    Procedure: VENOGRAM;  Surgeon: Robbi Lovell III, MD;  Location: [facility] OR;  Service: General;;    REMOVAL OF VASCULAR ACCESS PORT Right 2/13/2020    Procedure: REMOVAL, VASCULAR ACCESS PORT;  Surgeon: Robbi Lovell III, MD;  Location: [facility] OR;  Service: General;   Laterality: Right;    RETROGRADE PYELOGRAPHY  3/16/2021    Procedure: PYELOGRAM, RETROGRADE;  Surgeon: Damari Barber Jr., MD;  Location: [facility] OR;  Service: Urology;;    RETROGRADE PYELOGRAPHY Right 5/18/2021    Procedure: PYELOGRAM, RETROGRADE;  Surgeon: Damari Barber Jr., MD;  Location: [facility] OR;  Service: Urology;  Laterality: Right;    SHOULDER SURGERY      URETEROSCOPIC REMOVAL OF URETERIC CALCULUS Right 3/30/2021    Procedure: REMOVAL, CALCULUS, URETER, URETEROSCOPIC;  Surgeon: Damari Barber Jr., MD;  Location: [facility] OR;  Service: Urology;  Laterality: Right;    URETEROSCOPIC REMOVAL OF URETERIC CALCULUS Right 4/20/2021    Procedure: REMOVAL, CALCULUS, URETER, URETEROSCOPIC;  Surgeon: Damari Barber Jr., MD;  Location: [facility] OR;  Service: Urology;  Laterality: Right;    vulva cancer          Tobacco Use:  The patient  reports that she quit smoking about 22 years ago. Her smoking use included cigarettes. She has a 33.00 pack-year smoking history. She quit smokeless tobacco use about 22 years ago.     Results for orders placed or performed during the hospital encounter of 09/15/21   EKG 12-lead    Collection Time: 09/15/21  2:32 PM    Narrative    Test Reason : E87.8,    Vent. Rate : 071 BPM     Atrial Rate : 071 BPM     P-R Int : 146 ms          QRS Dur : 080 ms      QT Int : 466 ms       P-R-T Axes : 072 019 058 degrees     QTc Int : 506 ms    Sinus rhythm with Premature atrial complexes  Prolonged QT borderline lvh   Abnormal ECG  When compared with ECG of 17-MAY-2021 15:37,  Premature atrial complexes are now Present  QT has lengthened  Confirmed by Grant Payne MD ([id number]) on 9/16/2021 10:20:20 AM    Referred By: [email or web address]   SELF           Confirmed By:Grant Payne MD             Lab Results   Component Value Date    WBC 5.53 01/18/2022    HGB 9.7 (L) 01/18/2022    HCT 30.7 (L) 01/18/2022     (H) 01/18/2022     (L) 01/18/2022     BMP  Lab Results   Component Value Date     (L)  01/18/2022    K 4.4 01/18/2022     01/18/2022    CO2 20 (L) 01/18/2022    BUN 36 (H) 01/18/2022    CREATININE 6.7 (H) 01/18/2022    CALCIUM 8.2 (L) 01/18/2022    ANIONGAP 11 01/18/2022    ESTGFRAFRICA 6.6 (A) 01/18/2022    EGFRNONAA 5.7 (A) 01/18/2022       3/2020 ECHO    · Mild concentric left ventricular hypertrophy.  · Pulmonary hypertension present.  · Normal left ventricular systolic function. The estimated ejection fraction is 60%.  · Grade I (mild) left ventricular diastolic dysfunction consistent with impaired relaxation.  · No wall motion abnormalities.  · Normal right ventricular systolic function.  · Intermediate central venous pressure (8 mmHg).  · Trivial pericardial effusion.  · There is a left pleural effusion.  · The estimated PA systolic pressure is 47 mmHg        Anesthesia Evaluation    I have reviewed the Patient Summary Reports.    I have reviewed the Nursing Notes.   I have reviewed the Medications.     Review of Systems  Anesthesia Hx:  No problems with previous Anesthesia  History of prior surgery of interest to airway management or planning: cervical fusion. Denies Family Hx of Anesthesia complications.   Denies Personal Hx of Anesthesia complications.   Social:  Former Smoker    Hematology/Oncology:      Hematuria   -- Anemia: Hematology Comments: Chronic anemia, easy bruising Current/Recent Cancer. Oncology Comments: Vulvar and ovarian cancer s/p surgery, radiation, chemo. Pt has right chest port that she reports is non functional.     Cardiovascular:   Exercise tolerance: good.  Hypertension.  Functional Capacity good / => 4 METS    Pulmonary:   COPD, mild Sinus drainage for past few weeks, no recent fevers, abx, steorid use   Renal/:   Chronic Renal Disease, CRI LUE AVF but no history of HD, AVF with multiple vascular stents   Hepatic/GI:   IBS, short gut syndrome, chronic diarrhea; current nausea and abdominal pain;    Musculoskeletal:  Spine Disorders: cervical and lumbar     Neurological:   Peripheral Neuropathy (bilateral feet)    Endocrine:   Diabetes (previous hx, no medications currently) Hypothyroidism        Physical Exam  General:  Malnutrition    Airway/Jaw/Neck:  Airway Findings: Mouth Opening: Normal Mallampati: II  TM Distance: Normal, at least 6 cm  Jaw/Neck Findings:  Neck ROM: Normal ROM      Dental:  Dental Findings: (lower bridge) Upper Dentures    Chest/Lungs:  Chest/Lungs Findings: Clear to auscultation, Normal Respiratory Rate     Heart/Vascular:  Heart Findings: Rate: Normal  Rhythm: Regular Rhythm  Sounds: Normal  Vascular Findings:  Dialysis Access: AV Fistula LUE  Vascular Exam Findings: Right chest port (per patient, not functioning)       Skin:  Skin Findings:  Ecchymosis     Mental Status:  Mental Status Findings:  Cooperative, Alert and Oriented         Anesthesia Plan  Type of Anesthesia, risks & benefits discussed:  Anesthesia Type:  general  Patient's Preference:   Intra-op Monitoring Plan: standard ASA monitors and central line  Intra-op Monitoring Plan Comments:   Post Op Pain Control Plan: multimodal analgesia and IV/PO Opioids PRN  Post Op Pain Control Plan Comments:   Induction:   IV  Beta Blocker:  Patient is not currently on a Beta-Blocker (No further documentation required).       Informed Consent: Patient understands risks and agrees with Anesthesia plan.  Questions answered. Anesthesia consent signed with patient.  ASA Score: 3     Day of Surgery Review of History & Physical:    H&P update referred to the surgeon.     Anesthesia Plan Notes: GETA      Ready For Surgery From Anesthesia Perspective.         Past Surgical History:   Procedure Laterality Date    ABDOMINAL SURGERY      APPENDECTOMY      AV FISTULA PLACEMENT Left 2018    BACK SURGERY      BLADDER FULGURATION N/A 6/16/2020    Procedure: FULGURATION, BLADDER;  Surgeon: Damari Barber Jr., MD;  Location: [facility];  Service: Urology;  Laterality: N/A;    carpel tunnel surgery once on  each hand      CERVICAL FUSION      x 4    cervix repair      CHOLECYSTECTOMY  2000    COLECTOMY      CYSTOSCOPY W/ RETROGRADES  6/16/2020    Procedure: CYSTOSCOPY, WITH RETROGRADE PYELOGRAM;  Surgeon: Damari Barber Jr., MD;  Location: [facility] OR;  Service: Urology;;    CYSTOSCOPY W/ RETROGRADES Right 4/20/2021    Procedure: CYSTOSCOPY, WITH RETROGRADE PYELOGRAM;  Surgeon: Damari Barber Jr., MD;  Location: [facility] OR;  Service: Urology;  Laterality: Right;    CYSTOSCOPY W/ URETERAL STENT PLACEMENT Right 3/16/2021    Procedure: CYSTOSCOPY, WITH URETERAL STENT INSERTION;  Surgeon: Damari Barber Jr., MD;  Location: [facility] OR;  Service: Urology;  Laterality: Right;    CYSTOSCOPY W/ URETERAL STENT PLACEMENT Right 5/18/2021    Procedure: CYSTOSCOPY, WITH URETERAL STENT INSERTION;  Surgeon: Damari Barber Jr., MD;  Location: [facility] OR;  Service: Urology;  Laterality: Right;    CYSTOSCOPY W/ URETERAL STENT REMOVAL Right 4/20/2021    Procedure: CYSTOSCOPY, WITH URETERAL STENT REMOVAL;  Surgeon: Damari Barber Jr., MD;  Location: [facility] OR;  Service: Urology;  Laterality: Right;    CYSTOSCOPY WITH URETEROSCOPY, RETROGRADE PYELOGRAPHY, AND INSERTION OF STENT Right 3/30/2021    Procedure: CYSTOSCOPY, WITH RETROGRADE PYELOGRAM AND URETERAL STENT INSERTION;  Surgeon: Damari Barber Jr., MD;  Location: [facility] OR;  Service: Urology;  Laterality: Right;    DIAGNOSTIC LAPAROSCOPY N/A 2/13/2020    Procedure: LAPAROSCOPY, DIAGNOSTIC;  Surgeon: Robbi Lovell III, MD;  Location: [facility] OR;  Service: General;  Laterality: N/A;    HYSTERECTOMY  1980    ILEOSTOMY N/A 2/13/2020    Procedure: CREATION, ILEOSTOMY Loop;  Surgeon: Robbi Lovell III, MD;  Location: [facility] OR;  Service: General;  Laterality: N/A;    ILEOSTOMY CLOSURE N/A 5/18/2020    Procedure: CLOSURE, ILEOSTOMY;  Surgeon: Robbi Lovell III, MD;  Location: [facility] OR;  Service: General;  Laterality: N/A;    LASER LITHOTRIPSY Right 3/30/2021    Procedure: LITHOTRIPSY,  USING LASER;  Surgeon: Damari Barber Jr., MD;  Location: [facility] OR;  Service: Urology;  Laterality: Right;    LUMBAR LAMINECTOMY      LYSIS OF ADHESIONS N/A 2/13/2020    Procedure: LYSIS, ADHESIONS;  Surgeon: Robbi Lovell III, MD;  Location: [facility] OR;  Service: General;  Laterality: N/A;    NECK SURGERY      PHLEBOGRAPHY  2/28/2020    Procedure: VENOGRAM;  Surgeon: Robbi Lovell III, MD;  Location: [facility] OR;  Service: General;;    REMOVAL OF VASCULAR ACCESS PORT Right 2/13/2020    Procedure: REMOVAL, VASCULAR ACCESS PORT;  Surgeon: Robbi Lovell III, MD;  Location: [facility] OR;  Service: General;  Laterality: Right;    RETROGRADE PYELOGRAPHY  3/16/2021    Procedure: PYELOGRAM, RETROGRADE;  Surgeon: Damari Barber Jr., MD;  Location: [facility];  Service: Urology;;    RETROGRADE PYELOGRAPHY Right 5/18/2021    Procedure: PYELOGRAM, RETROGRADE;  Surgeon: Damari Barber Jr., MD;  Location: [facility] OR;  Service: Urology;  Laterality: Right;    SHOULDER SURGERY      URETEROSCOPIC REMOVAL OF URETERIC CALCULUS Right 3/30/2021    Procedure: REMOVAL, CALCULUS, URETER, URETEROSCOPIC;  Surgeon: Damari Barber Jr., MD;  Location: [facility] OR;  Service: Urology;  Laterality: Right;    URETEROSCOPIC REMOVAL OF URETERIC CALCULUS Right 4/20/2021    Procedure: REMOVAL, CALCULUS, URETER, URETEROSCOPIC;  Surgeon: Damari Barber Jr., MD;  Location: [facility];  Service: Urology;  Laterality: Right;    vulva cancer          Tobacco Use:  The patient  reports that she quit smoking about 22 years ago. Her smoking use included cigarettes. She has a 33.00 pack-year smoking history. She quit smokeless tobacco use about 22 years ago.     Results for orders placed or performed during the hospital encounter of 09/15/21   EKG 12-lead    Collection Time: 09/15/21  2:32 PM    Narrative    Test Reason : E87.8,    Vent. Rate : 071 BPM     Atrial Rate : 071 BPM     P-R Int : 146 ms          QRS Dur : 080 ms      QT Int : 466 ms       P-R-T Axes  : 072 019 058 degrees     QTc Int : 506 ms    Sinus rhythm with Premature atrial complexes  Prolonged QT borderline lvh   Abnormal ECG  When compared with ECG of 17-MAY-2021 15:37,  Premature atrial complexes are now Present  QT has lengthened  Confirmed by Grant Payne MD ([id number]) on 9/16/2021 10:20:20 AM    Referred By: [email or web address]   SELF           Confirmed By:Grant Payne MD             Lab Results   Component Value Date    WBC 5.53 01/18/2022    HGB 9.7 (L) 01/18/2022    HCT 30.7 (L) 01/18/2022     (H) 01/18/2022     (L) 01/18/2022     BMP  Lab Results   Component Value Date     (L) 01/18/2022    K 4.4 01/18/2022     01/18/2022    CO2 20 (L) 01/18/2022    BUN 36 (H) 01/18/2022    CREATININE 6.7 (H) 01/18/2022    CALCIUM 8.2 (L) 01/18/2022    ANIONGAP 11 01/18/2022    ESTGFRAFRICA 6.6 (A) 01/18/2022    EGFRNONAA 5.7 (A) 01/18/2022           Pre-op Assessment    I have reviewed the Patient Summary Reports.     I have reviewed the Nursing Notes. I have reviewed the NPO Status.   I have reviewed the Medications.     Review of Systems  Anesthesia Hx:  No problems with previous Anesthesia  Denies Family Hx of Anesthesia complications.   Denies Personal Hx of Anesthesia complications.   Social:  Non-Smoker, No Alcohol Use    Hematology/Oncology:         -- Anemia: Hematology Comments:     --  Cancer in past history (Vulvar cancinoma): s/p surgery, s/p radiation therapy and s/p chemotherapy     EENT/Dental:  EENT/Dental Normal Full upper and lower partial out.  Broken right lower molar   Cardiovascular:   Hypertension, well controlled ECG has been reviewed.    Renal/:   Chronic Renal Disease (Patient dialyzed yesterday. AV fistula hematoma, she is going to get a dialysis catheter placed later.), ESRD     Hepatic/GI:  Hepatic/GI Normal    Musculoskeletal:  Musculoskeletal Normal    Neurological:   Neuromuscular Disease, (LTAC)    Endocrine:   Diabetes Hypothyroidism    Psych:  Psychiatric  Normal           Physical Exam  General:  Well nourished    Airway/Jaw/Neck:  Airway Findings: Mouth Opening: Normal Tongue: Normal  General Airway Assessment: Adult  Mallampati: II  Improves to II with phonation.  TM Distance: Normal, at least 6 cm  Jaw/Neck Findings:  Neck ROM: Extension Decreased, Mild      Dental:  Dental Findings: Upper partial dentures, Lower partial dentures   Chest/Lungs:  Chest/Lungs Findings: Clear to auscultation, Normal Respiratory Rate     Heart/Vascular:  Heart Findings: Rate: Normal  Rhythm: Regular Rhythm  Sounds: Normal        Mental Status:  Mental Status Findings:  Cooperative, Alert and Oriented         Anesthesia Plan  Type of Anesthesia, risks & benefits discussed:  Anesthesia Type:  general    Patient's Preference:   Plan Factors:          Intra-op Monitoring Plan: standard ASA monitors  Intra-op Monitoring Plan Comments:   Post Op Pain Control Plan: per primary service following discharge from PACU and IV/PO Opioids PRN  Post Op Pain Control Plan Comments:     Induction:   IV  Beta Blocker:  Patient is not currently on a Beta-Blocker (No further documentation required).       Informed Consent: Patient understands risks and agrees with Anesthesia plan.  Questions answered. Anesthesia consent signed with patient.  ASA Score: 3     Day of Surgery Review of History & Physical:    H&P update referred to the surgeon.     Anesthesia Plan Notes: GETA,   No decadron (patient has vulvar cellulitis)  Zofran, Pepcid  Ofirmev 1000mg             Ready For Surgery From Anesthesia Perspective.

## 2022-01-18 NOTE — PROGRESS NOTES
Patient in OR   D/w Dr. Barber who exchanged her stent.   Urine culture obtained at 0331 today, unsure if cath or CCMS.  Will f/u

## 2022-01-19 NOTE — NURSING
Patient arrived back to room. Patient denies of any pain at this time. No acute distress noted will cont to monitor.

## 2022-01-19 NOTE — PROGRESS NOTES
Consult Note  Infectious Disease    Reason for Consult:  Vulvar cellulitis    HPI: Shara Hutchins is a 71 y.o. female well known to me from prior consultations, last seen 12/14/20 for same diagnosis. She presented to the ED /16 with 2 weeks of worsening vulvar and perineal irritation and  Subsequent 2-3 days of cellulitis, low grade fever, and swelling of the labia and perineum, extending to rectum and buttocks. CT abd/pelvis was performed with severe right hydroureteronephrosis, stent in place, stone right mid ureter, some perinephric stranding and moderate volume gas and fecal material suggesting constipation. (last stent exchange was 5/18/21 and has not followed up with urology since. She states this is because she cannot afford the visits. She forgot that she still had a stent in place). She was started on vanc, meropenem and fluconazole. Blood cultures are negative so far. No urinalysis was obtained as it would have been contaminated or too painful. She has infiltration along AVF making dialysis not possible through that access and vascular consult pending for percutaneous access.     1/19:  Interim reviewed.  Today underwent percutaneous vascular procedure to improve blood flow through her AV fistula.  She had her left ureteral stent changed yesterday.  Temperature curve is improved but she also received Decadron 4 mg prior to surgery yesterday.  Urinalysis sent yesterday morning with 50 white blood cells and negative culture thus far. Patient in dialysis this date while on rounds  1/20: interim reviewed.  She feels better.  The erythema of her mons pubis and inner thighs is at least 75% better.  She is not evolved any abscesses.  She finds that her stools are slightly more formed today and does not feel as though she needs the Questran.  She may have experienced benefit from the opiates given for her various procedures.  She tells me that she is going to have another try at using left arm AV fistula for  dialysis tomorrow prior to discharge    EXAM & DIAGNOSTICS REVIEWED:   Vitals:     Temp:  [97.1 °F (36.2 °C)-98.2 °F (36.8 °C)]   Temp: 98 °F (36.7 °C) (01/19/22 1400)  Pulse: 61 (01/19/22 1430)  Resp: 18 (01/19/22 1442)  BP: 120/63 (01/19/22 1430)  SpO2: 100 % (01/19/22 1415)    Intake/Output Summary (Last 24 hours) at 1/19/2022 1500  Last data filed at 1/18/2022 1730  Gross per 24 hour   Intake 500 ml   Output 2500 ml   Net -2000 ml       General:  In NAD. Alert and attentive, cooperative, comfortable  Eyes:  Anicteric, PERRL, EOMI  ENT:  No ulcers, exudates, thrush, nares patent, dentition is good  Neck:  supple,    Lungs:    Heart:     Abd:  Soft, NT, ND, normal BS   :  Voids  no flank tenderness. Has dense, chronic edema of the mons pubis, labia and inner buttocks. No sign of necrotizing skin infection. No focal abscesses.  And the erythema is much improved  Musc:  Joints without effusion, swelling, erythema, synovitis, muscle wasting.   Skin:  No rashes.   Neuro:             Alert, attentive, speech fluent, face symmetric, moves all extremities, no focal weakness. Ambulatory  Psych: Calm, cooperative  Lymphatic:        Extrem: Left upper arm ecchymosis at AVF. No cellulitis  VAD:        Isolation:  none  Wound:             Lines/Tubes/Drains:    General Labs reviewed:  Recent Labs   Lab 01/17/22  0438 01/18/22  0412 01/19/22  0325   WBC 7.64 5.53 4.28   HGB 9.6* 9.7* 9.5*   HCT 30.9* 30.7* 30.6*   * 128* 136*       Recent Labs   Lab 01/16/22  1115 01/17/22  0438 01/18/22  0412    136 135*   K 4.0 4.7 4.4    104 104   CO2 23 21* 20*   BUN 29* 32* 36*   CREATININE 5.7* 6.1* 6.7*   CALCIUM 8.3* 8.0* 8.2*   PROT 6.0  --   --    BILITOT 0.8  --   --    ALKPHOS 98  --   --    ALT 10  --   --    AST 12  --   --      No results for input(s): CRP in the last 168 hours.      Micro:  Microbiology Results (last 7 days)     Procedure Component Value Units Date/Time    Blood culture #2 **CANNOT BE  ORDERED STAT** [[id number]] Collected: 01/16/22 1130    Order Status: Completed Specimen: Blood from Peripheral, Antecubital, Right Updated: 01/19/22 1432     Blood Culture, Routine No Growth to date      No Growth to date      No Growth to date      No Growth to date    Blood culture #1 **CANNOT BE ORDERED STAT** [[id number]] Collected: 01/16/22 1115    Order Status: Completed Specimen: Blood from Peripheral, Antecubital, Right Updated: 01/19/22 1232     Blood Culture, Routine No Growth to date      No Growth to date      No Growth to date      No Growth to date    Urine culture [[id number]] Collected: 01/18/22 0331    Order Status: Completed Specimen: Urine Updated: 01/19/22 0640     Urine Culture, Routine No growth to date    Narrative:      Specimen Source->Urine          Imaging Reviewed:   CXR   CT abd/pelvis    Cardiology:    IMPRESSION & PLAN   1. Chronic perineal lymphedema, h/o vulvar cancer and radiation  2. Secondary cellulitis of mons, labia and inner buttocks  3. Urinary incontinence, sensation of incomplete emptying   Indwelling right ureteral stent, last change 5/2021, 1/18    4.  ESRD on dialysis.  Hemorrhage into AV fistula status post angioplasty of in stent stenosis 1/19  5. Chronic diarrhea, short gut syndrome      Recommendations:  vanc and rocephin until discharge and then cefuroxime p.o..  I have given her prescription for this   Local care, wound care    Oral diflucan   Please call if needed    Medical Decision Making during this encounter was  [_] Low Complexity  [_] Moderate Complexity  [ xx ] High Complexity

## 2022-01-19 NOTE — NURSING
Patient left floor via bed to for fistula intervention with cath lab nursing staff. Patient is stable and without any pain/discomfort noted. Will continue to monitor.

## 2022-01-19 NOTE — PLAN OF CARE
Important Message from Medicare was sign, explained and given to patient/caregiver on 01/19/2022 at 10:09am     addressed any questions or concerns.    Important Message from Medicare document will be scanned into patient's medical record

## 2022-01-19 NOTE — OP NOTE
[facility]  Surgery Department  Operative Note    SUMMARY     Date of Procedure: 1/19/2022     Procedure: Procedure(s) (LRB):  Fistulogram with Possible Intervention (N/A)  PTA, AV FISTULA (N/A)  Stent, Fistula     Surgeon(s) and Role:     * Raz Maher MD - Primary    Assisting Surgeon: None    Pre-Operative Diagnosis: ESRD on dialysis [N18.6, Z99.2]    Post-Operative Diagnosis: Post-Op Diagnosis Codes:     * ESRD on dialysis [N18.6, Z99.2]    Anesthesia: RN IV Sedation    Operative Findings (including complications, if any):  Left upper arm AV fistula in stent stenosis    Description of Technical Procedures:  Left upper extremity AV fistulogram.  Angioplasty and stenting of proximal in stent stenosis with 8 x 5 [address] post dilated 8 mm physician directed nurse administered conscious sedation.    Left arm AV fistula was accessed in retrograde fashion 6 Cypriot sheath placed fistulogram of the arm and with compression to get reflux across the arterial anastomosis was performed.  The arteriovenous anastomosis widely patent distal cephalic vein is widely patent the stent in the upper fistula is patent except for the very most proximal aspect where there is severe InStent stenosis the axillary and central veins are patent.  We upsized to a 7 Cypriot sheath cross the lesion with of the 18 wire placed an 8 x 5 [address] across the stenosis and post dilated to 8 mm repeat angiogram showed excellent flow no residual stenosis sheath was pulled and stitch closed.    Significant Surgical Tasks Conducted by the Assistant(s), if Applicable:     Estimated Blood Loss (EBL): * No values recorded between 1/19/2022 10:55 AM and 1/19/2022 11:11 AM *           Implants:   Implant Name Type Inv. Item Serial No.  Lot No. LRB No. Used Action   STENT VIABAHN  8MM X 5CM - [id number] stent peripheral covered- self expanding STENT VIABAHN  8MM X 5CM [id number]   N/A 1 Implanted        Specimens:   Specimen (24h ago, onward)            None                  Condition: Good    Disposition: PACU - hemodynamically stable.    Attestation: I was present and scrubbed for the entire procedure.

## 2022-01-19 NOTE — CARE UPDATE
01/18/22 2029   Patient Assessment/Suction   Level of Consciousness (AVPU) alert   Respiratory Effort Unlabored   Expansion/Accessory Muscles/Retractions expansion symmetric;no retractions;no use of accessory muscles   All Lung Fields Breath Sounds clear   Rhythm/Pattern, Respiratory pattern regular;unlabored   PRE-TX-O2   O2 Device (Oxygen Therapy) room air   SpO2 95 %   Pulse Oximetry Type Intermittent   $ Pulse Oximetry - Multiple Charge Pulse Oximetry - Multiple   Pulse 69   Resp 16   Respiratory Interventions   Cough And Deep Breathing done with encouragement   Breathing Techniques/Airway Clearance deep/controlled cough encouraged;diaphragmatic breathing promoted;pursed-lip breathing encouraged   Education   $ Education Other (see comment);15 min  (pox,deep diaphragmatic breathing exercises)   Respiratory Evaluation   $ Care Plan Tech Time 15 min   $ Eval/Re-eval Charges Evaluation   Evaluation For New Orders

## 2022-01-19 NOTE — PROGRESS NOTES
[facility] Medicine  Progress Note    Patient Name: Shara Hutchins  MRN: [id number]  Patient Class: IP- Inpatient   Admission Date: 1/16/2022  Length of Stay: 3 days  Attending Physician: Ashwin Olson MD  Primary Care Provider: April Horton MD        Subjective:     Principal Problem:ESRD on dialysis        HPI:  Patient presents to the emergency department with vulvar cellulitis that is recurrent.  The patient states that about 1 week ago she developed itching, redness, increased pain to the vulva.  She has a pertinent past medical history of fall vol cancer status post radiation and has since had recurrent episodes of cellulitis in the area.  She reports applying different types of creams such as Neosporin, Reginald Butt paste, calseptin, Aquaphor, and others with no improvement and about 3 days ago the symptoms worsened.  Her history is also complicated by chronic diarrhea since being treated with radiation in which she has loose incontinent stools multiple times per day.     The patient's current problem of cellulitis begins at superior-most aspect of her vulva and extends across the entire perineum to between the gluteal folds. The cellulitis also extends into the groin bilaterally.  The patient's pain has progressed to severe, nonradiating, described as burning, itching and dull.  Unrelieved by any home analgesics or topical medications.  Associated with intermittent low grade fever (not measured at home), occasional bleeding from excoriations of the skin, and hardened swelling of the affected area.  Or she also has intermittent nausea she believes is associated with the severe pain.  No vomiting.  The patient has been evaluated and treated by Dr. Blanca Neal in the past.  The patient was treated with meropenem and vancomycin in the emergency department.  PCN allergy noted and the patient reported tolerating meropenem and the past.     Imaging obtained in the emergency department  "incidentally revealed an obstructive, right-sided nephrolithiasis by 6 mm kidney stone that is causing perinephric fat stranding/hydronephrosis..  She has a history of nephrolithiasis and a CT.  The patient did receive IV contrast in the emergency department for this imaging and has hemodialysis 3 times a week overseen by Dr. Dietz.  The patient denied flank pain, hematuria, increased urination or urgency.  She does have a history of anuria.      Overview/Hospital Course:  01/18  Assumed care. Chart reviewed. Patient seen at [facility]: after cystoscopy/stent. Feels "Good". Labs reviewed and noted below: no leukocytosis with stable macrocytic anemia; trivial hyponatremia with end stage renal dysfunction    01/19  Had stent re-do today, followed by [facility]. Only complaint is vaginitis is persisting. Feels "Good" and would like to be discharged when cleared by Renal and ID. Labs reviewed and noted below: no leukocytosis with stable macrocytic anemia. Continue current regimen.       Interval History: stable    Review of Systems   Constitutional: Positive for fatigue.   HENT: Negative.    Eyes: Negative.    Respiratory: Negative.    Cardiovascular: Negative.    Gastrointestinal: Negative.    Endocrine: Negative.    Genitourinary: Negative.    Musculoskeletal: Negative.    Skin: Negative.    Allergic/Immunologic: Negative.    Neurological: Negative.    Hematological: Negative.    All other systems reviewed and are negative.    Objective:     Vital Signs (Most Recent):  Temp: 98 °F (36.7 °C) (01/19/22 1630)  Pulse: 64 (01/19/22 1630)  Resp: 18 (01/19/22 1701)  BP: (!) 102/51 (01/19/22 1630)  SpO2: (!) 92 % (01/19/22 1630) Vital Signs (24h Range):  Temp:  [97.1 °F (36.2 °C)-98.2 °F (36.8 °C)] 98 °F (36.7 °C)  Pulse:  [57-71] 64  Resp:  [16-20] 18  SpO2:  [91 %-100 %] 92 %  BP: ()/(49-63) 102/51     Weight: 68.3 kg (150 lb 9.2 oz)  Body mass index is 23.24 kg/m².    Intake/Output Summary (Last 24 hours) at 1/19/2022 1705  Last " data filed at 1/19/2022 1630  Gross per 24 hour   Intake 1000 ml   Output 4000 ml   Net -3000 ml      Physical Exam  Vitals and nursing note reviewed.   Constitutional:       Appearance: She is well-developed and well-nourished.   HENT:      Head: Normocephalic and atraumatic.      Right Ear: External ear normal.      Left Ear: External ear normal.      Nose: Nose normal.      Mouth/Throat:      Mouth: Oropharynx is clear and moist.   Eyes:      Extraocular Movements: EOM normal.      Conjunctiva/sclera: Conjunctivae normal.      Pupils: Pupils are equal, round, and reactive to light.   Cardiovascular:      Rate and Rhythm: Normal rate and regular rhythm.      Pulses: Intact distal pulses.      Heart sounds: Normal heart sounds.   Pulmonary:      Effort: Pulmonary effort is normal.      Breath sounds: Normal breath sounds.   Abdominal:      General: Bowel sounds are normal.      Palpations: Abdomen is soft.   Musculoskeletal:         General: Normal range of motion.      Cervical back: Normal range of motion and neck supple.   Skin:     General: Skin is warm and dry.      Capillary Refill: Capillary refill takes less than 2 seconds.      Comments: Large ecchymosis right upper arm; thrill   Neurological:      Mental Status: She is alert and oriented to person, place, and time.   Psychiatric:         Mood and Affect: Mood and affect normal.         Behavior: Behavior normal.         Thought Content: Thought content normal.         Judgment: Judgment normal.         Significant Labs:   All pertinent labs within the past 24 hours have been reviewed.  BMP:   Recent Labs   Lab 01/18/22 0412   GLU 79   *   K 4.4      CO2 20*   BUN 36*   CREATININE 6.7*   CALCIUM 8.2*   MG 1.9     CBC:   Recent Labs   Lab 01/18/22 0412 01/19/22  0325   WBC 5.53 4.28   HGB 9.7* 9.5*   HCT 30.7* 30.6*   * 136*       Significant Imaging: I have reviewed all pertinent imaging results/findings within the past 24  hours.      Assessment/Plan:      No notes have been filed under this hospital service.  Service: Hospital Medicine    VTE Risk Mitigation (From admission, onward)         Ordered     enoxaparin injection 30 mg  Daily         01/16/22 1502     IP VTE HIGH RISK PATIENT  Once         01/16/22 1502     Place sequential compression device  Until discontinued         01/16/22 1502                Discharge Planning   RODRIGO: 1/21/2022     Code Status: Full Code   Is the patient medically ready for discharge?:     Reason for patient still in hospital (select all that apply): Patient trending condition and Treatment  Discharge Plan A: Assisted Living                  Ashwin Olson MD  Department of Hospital Medicine   [facility]

## 2022-01-19 NOTE — SUBJECTIVE & OBJECTIVE
Interval History: stable    Review of Systems   Constitutional: Positive for fatigue.   HENT: Negative.    Eyes: Negative.    Respiratory: Negative.    Cardiovascular: Negative.    Gastrointestinal: Negative.    Endocrine: Negative.    Genitourinary: Negative.    Musculoskeletal: Negative.    Skin: Negative.    Allergic/Immunologic: Negative.    Neurological: Negative.    Hematological: Negative.    All other systems reviewed and are negative.    Objective:     Vital Signs (Most Recent):  Temp: 98 °F (36.7 °C) (01/19/22 1630)  Pulse: 64 (01/19/22 1630)  Resp: 18 (01/19/22 1701)  BP: (!) 102/51 (01/19/22 1630)  SpO2: (!) 92 % (01/19/22 1630) Vital Signs (24h Range):  Temp:  [97.1 °F (36.2 °C)-98.2 °F (36.8 °C)] 98 °F (36.7 °C)  Pulse:  [57-71] 64  Resp:  [16-20] 18  SpO2:  [91 %-100 %] 92 %  BP: ()/(49-63) 102/51     Weight: 68.3 kg (150 lb 9.2 oz)  Body mass index is 23.24 kg/m².    Intake/Output Summary (Last 24 hours) at 1/19/2022 1705  Last data filed at 1/19/2022 1630  Gross per 24 hour   Intake 1000 ml   Output 4000 ml   Net -3000 ml      Physical Exam  Vitals and nursing note reviewed.   Constitutional:       Appearance: She is well-developed and well-nourished.   HENT:      Head: Normocephalic and atraumatic.      Right Ear: External ear normal.      Left Ear: External ear normal.      Nose: Nose normal.      Mouth/Throat:      Mouth: Oropharynx is clear and moist.   Eyes:      Extraocular Movements: EOM normal.      Conjunctiva/sclera: Conjunctivae normal.      Pupils: Pupils are equal, round, and reactive to light.   Cardiovascular:      Rate and Rhythm: Normal rate and regular rhythm.      Pulses: Intact distal pulses.      Heart sounds: Normal heart sounds.   Pulmonary:      Effort: Pulmonary effort is normal.      Breath sounds: Normal breath sounds.   Abdominal:      General: Bowel sounds are normal.      Palpations: Abdomen is soft.   Musculoskeletal:         General: Normal range of motion.       Cervical back: Normal range of motion and neck supple.   Skin:     General: Skin is warm and dry.      Capillary Refill: Capillary refill takes less than 2 seconds.      Comments: Large ecchymosis right upper arm; thrill   Neurological:      Mental Status: She is alert and oriented to person, place, and time.   Psychiatric:         Mood and Affect: Mood and affect normal.         Behavior: Behavior normal.         Thought Content: Thought content normal.         Judgment: Judgment normal.         Significant Labs:   All pertinent labs within the past 24 hours have been reviewed.  BMP:   Recent Labs   Lab 01/18/22  0412   GLU 79   *   K 4.4      CO2 20*   BUN 36*   CREATININE 6.7*   CALCIUM 8.2*   MG 1.9     CBC:   Recent Labs   Lab 01/18/22 0412 01/19/22  0325   WBC 5.53 4.28   HGB 9.7* 9.5*   HCT 30.7* 30.6*   * 136*       Significant Imaging: I have reviewed all pertinent imaging results/findings within the past 24 hours.

## 2022-01-19 NOTE — NURSING
Patient came back from fistulogram of the left arm AV fistula, AV fistula is CDI with gauze dressing in place. Patient denies pain/discomfort. Patient VS WNL. Patient now being transferred off the floor to HD via bed with nursing staff at side. No cream sent from pharmacy, dialysis nurse notified. Patient is to go to 1202 after HD, report was given to next nurse. All questions answered. Will continue to monitor.

## 2022-01-19 NOTE — CONSULTS
Nephrology Consult Note        Patient Name: Shara Hutchins  MRN: [id number]    Patient Class: IP- Inpatient   Admission Date: 1/16/2022  Length of Stay: 3 days  Date of Service: 1/19/2022    Attending Physician: Ashwin Olson MD  Primary Care Provider: April Horton MD    Reason for Consult: esrd/anemia/htn/shpt/vulvar cellulitis    SUBJECTIVE:     HPI: ESRD patient on HD MWF presents to the emergency department with recurrent vulvar cellulitis, complicated by chronic diarrhea since being treated with radiation. Unrelieved by any home analgesics or topical medications. Associated with intermittent low grade fever, occasional bleeding from excoriations of the skin, and hardened swelling of the affected area. She has intermittent nausea associated with the severe pain.     Imaging obtained in the emergency department incidentally revealed an obstructive, right-sided nephrolithiasis by 6 mm kidney stone causing perinephric fat stranding/hydronephrosis. The patient denied flank pain, hematuria, increased urination or urgency.    1/17  AVF non-functioning.  Consult Vascular surgery for trialysis catheter and avf evaluation  1/18  Good avf flow on ultrasound.  At a procedure  1/19  Seen on dialysis.  No cp or sob.  AVF sore    Past Medical History:   Diagnosis Date    Cellulitis of trunk     Perineum, numerous episodes    Chronic diarrhea 02/10/2020    Short-gut syndrome    Diabetes mellitus, type 2     pt has not had for years after losing weight    End stage renal disease on dialysis 01/07/2018    Fatigue     Hypertension     Hypothyroidism 2/10/2020    Iron deficiency anemia due to chronic blood loss 1/7/2018    Pulmonary hypertension 3/20/2020    Vulvar cancer 1/7/2019     Past Surgical History:   Procedure Laterality Date    ABDOMINAL SURGERY      APPENDECTOMY      AV FISTULA PLACEMENT Left 2018    BACK SURGERY      BLADDER FULGURATION N/A 6/16/2020    Procedure: FULGURATION, BLADDER;  Surgeon: Damari  LASHANDA Barber Jr., MD;  Location: [facility] OR;  Service: Urology;  Laterality: N/A;    carpel tunnel surgery once on each hand      CERVICAL FUSION      x 4    cervix repair      CHOLECYSTECTOMY  2000    COLECTOMY      CYSTOSCOPY W/ RETROGRADES  6/16/2020    Procedure: CYSTOSCOPY, WITH RETROGRADE PYELOGRAM;  Surgeon: Damari Barber Jr., MD;  Location: [facility] OR;  Service: Urology;;    CYSTOSCOPY W/ RETROGRADES Right 4/20/2021    Procedure: CYSTOSCOPY, WITH RETROGRADE PYELOGRAM;  Surgeon: Damari Barber Jr., MD;  Location: [facility] OR;  Service: Urology;  Laterality: Right;    CYSTOSCOPY W/ URETERAL STENT PLACEMENT Right 3/16/2021    Procedure: CYSTOSCOPY, WITH URETERAL STENT INSERTION;  Surgeon: Damari Barber Jr., MD;  Location: [facility] OR;  Service: Urology;  Laterality: Right;    CYSTOSCOPY W/ URETERAL STENT PLACEMENT Right 5/18/2021    Procedure: CYSTOSCOPY, WITH URETERAL STENT INSERTION;  Surgeon: Damari Barber Jr., MD;  Location: [facility] OR;  Service: Urology;  Laterality: Right;    CYSTOSCOPY W/ URETERAL STENT REMOVAL Right 4/20/2021    Procedure: CYSTOSCOPY, WITH URETERAL STENT REMOVAL;  Surgeon: Damari Barber Jr., MD;  Location: [facility] OR;  Service: Urology;  Laterality: Right;    CYSTOSCOPY WITH URETEROSCOPY, RETROGRADE PYELOGRAPHY, AND INSERTION OF STENT Right 3/30/2021    Procedure: CYSTOSCOPY, WITH RETROGRADE PYELOGRAM AND URETERAL STENT INSERTION;  Surgeon: Damari Barber Jr., MD;  Location: [facility] OR;  Service: Urology;  Laterality: Right;    CYSTOURETEROSCOPY WITH RETROGRADE PYELOGRAPHY AND INSERTION OF STENT INTO URETER Right 1/18/2022    Procedure: CYSTOURETEROSCOPY, WITH RETROGRADE PYELOGRAM AND URETERAL STENT INSERTION;  Surgeon: Damari Barber Jr., MD;  Location: [facility] OR;  Service: Urology;  Laterality: Right;    DIAGNOSTIC LAPAROSCOPY N/A 2/13/2020    Procedure: LAPAROSCOPY, DIAGNOSTIC;  Surgeon: Robbi Lovell III, MD;  Location: [facility] OR;  Service: General;  Laterality: N/A;    HYSTERECTOMY  1980     ILEOSTOMY N/A 2/13/2020    Procedure: CREATION, ILEOSTOMY Loop;  Surgeon: Robbi Lovell III, MD;  Location: [facility] OR;  Service: General;  Laterality: N/A;    ILEOSTOMY CLOSURE N/A 5/18/2020    Procedure: CLOSURE, ILEOSTOMY;  Surgeon: Robbi Lovell III, MD;  Location: [facility] OR;  Service: General;  Laterality: N/A;    LASER LITHOTRIPSY Right 3/30/2021    Procedure: LITHOTRIPSY, USING LASER;  Surgeon: Damari Barber Jr., MD;  Location: [facility] OR;  Service: Urology;  Laterality: Right;    LUMBAR LAMINECTOMY      LYSIS OF ADHESIONS N/A 2/13/2020    Procedure: LYSIS, ADHESIONS;  Surgeon: Robbi Lovell III, MD;  Location: [facility] OR;  Service: General;  Laterality: N/A;    NECK SURGERY      PHLEBOGRAPHY  2/28/2020    Procedure: VENOGRAM;  Surgeon: Robbi Lovell III, MD;  Location: [facility] OR;  Service: General;;    REMOVAL OF VASCULAR ACCESS PORT Right 2/13/2020    Procedure: REMOVAL, VASCULAR ACCESS PORT;  Surgeon: Robbi Lovell III, MD;  Location: [facility] OR;  Service: General;  Laterality: Right;    RETROGRADE PYELOGRAPHY  3/16/2021    Procedure: PYELOGRAM, RETROGRADE;  Surgeon: Damari Barber Jr., MD;  Location: [facility] OR;  Service: Urology;;    RETROGRADE PYELOGRAPHY Right 5/18/2021    Procedure: PYELOGRAM, RETROGRADE;  Surgeon: Damari Barber Jr., MD;  Location: [facility] OR;  Service: Urology;  Laterality: Right;    SHOULDER SURGERY      URETEROSCOPIC REMOVAL OF URETERIC CALCULUS Right 3/30/2021    Procedure: REMOVAL, CALCULUS, URETER, URETEROSCOPIC;  Surgeon: Damari Barber Jr., MD;  Location: [facility] OR;  Service: Urology;  Laterality: Right;    URETEROSCOPIC REMOVAL OF URETERIC CALCULUS Right 4/20/2021    Procedure: REMOVAL, CALCULUS, URETER, URETEROSCOPIC;  Surgeon: Damari Barber Jr., MD;  Location: [facility] OR;  Service: Urology;  Laterality: Right;    vulva cancer       Family History   Problem Relation Age of Onset    Heart disease Mother     Hypertension Mother     Cancer Mother          lung    Heart disease Father     Heart disease Sister         Open heart surgery    No Known Problems Daughter      Social History     Tobacco Use    Smoking status: Former Smoker     Packs/day: 1.00     Years: 33.00     Pack years: 33.00     Types: Cigarettes     Quit date: 2000     Years since quittin.0    Smokeless tobacco: Former User     Quit date:    Substance Use Topics    Alcohol use: No    Drug use: No       Review of patient's allergies indicates:   Allergen Reactions    Ciprofloxacin Other (See Comments)     Elevated liver enzymes      Pcn [penicillins] Anaphylaxis     TOLERATES ROCEPHIN WITHOUT DIFFICULTY       Outpatient meds:  Current Facility-Administered Medications on File Prior to Encounter   Medication Dose Route Frequency Provider Last Rate Last Admin    electrolyte-S (ISOLYTE)   Intravenous Continuous Raz Ruff MD        lactated ringers infusion   Intravenous Continuous Ruy Beltre MD        LIDOcaine (PF) 10 mg/ml (1%) injection 5 mg  0.5 mL Intradermal Once Raz Ruff MD         Current Outpatient Medications on File Prior to Encounter   Medication Sig Dispense Refill    CHOLESTYRAMINE LIGHT 4 gram packet Take 1 packet by mouth 2 (two) times daily.      cyclobenzaprine (FLEXERIL) 10 MG tablet Take 1 tablet (10 mg total) by mouth 3 (three) times daily as needed for Muscle spasms. 30 tablet 1    diltiaZEM (CARDIZEM CD) 120 MG Cp24 Take 1 capsule (120 mg total) by mouth once daily. 90 capsule 0    diphenhydrAMINE (BENADRYL) 25 mg capsule Take 25 mg by mouth every 12 (twelve) hours as needed for Itching.      gabapentin (NEURONTIN) 100 MG capsule Take 100 mg by mouth nightly as needed. HCS      levothyroxine (SYNTHROID) 88 MCG tablet Take 1 tablet (88 mcg total) by mouth before breakfast. 90 tablet 0    promethazine (PHENERGAN) 25 MG tablet TAKE 1 TABLET(25 MG) BY MOUTH TWICE DAILY AS NEEDED FOR NAUSEA (Patient taking differently: Take 25 mg by  mouth 2 (two) times daily as needed for Nausea.) 60 tablet 0    sertraline (ZOLOFT) 50 MG tablet Take 50 mg by mouth every evening.      doxycycline (VIBRAMYCIN) 100 MG Cap Take 100 mg by mouth every 12 (twelve) hours. HCS      ferric citrate (AURYXIA) 210 mg iron Tab Take 2 tablets by mouth 3 (three) times daily with meals.      traMADoL (ULTRAM) 50 mg tablet Take 1 tablet (50 mg total) by mouth every 8 (eight) hours as needed for Pain. 40 tablet 0       Scheduled meds:   cefTRIAXone (ROCEPHIN) IVPB  1 g Intravenous Q24H    cholestyramine-aspartame  1 packet Oral BID    diltiaZEM  120 mg Oral Daily    enoxaparin  30 mg Subcutaneous Daily    ferric citrate  2 tablet Oral TID WM    fluconazole  100 mg Oral QHS    levothyroxine  88 mcg Oral Before breakfast    miconazole   Topical (Top) BID    mupirocin   Nasal BID    sertraline  50 mg Oral QHS       Infusions:      PRN meds:  acetaminophen, cyclobenzaprine, diphenhydrAMINE, gabapentin, LIDOcaine-prilocaine, morphine, ondansetron, oxyCODONE-acetaminophen, sodium chloride 0.9%, sodium chloride 0.9%, vancomycin - pharmacy to dose    Review of Systems:  Review of Systems   Constitutional: Negative for chills, fever, malaise/fatigue and weight loss.   HENT: Negative for hearing loss and nosebleeds.    Eyes: Negative for blurred vision, double vision and photophobia.   Respiratory: Negative for cough, shortness of breath and wheezing.    Cardiovascular: Negative for chest pain, palpitations and leg swelling.   Gastrointestinal: Negative for abdominal pain, constipation, diarrhea, heartburn, nausea and vomiting.   Genitourinary: Negative for dysuria, frequency and urgency.   Musculoskeletal: Negative for falls, joint pain and myalgias.   Skin: Negative for itching and rash.   Neurological: Negative for dizziness, speech change, focal weakness, loss of consciousness and headaches.   Endo/Heme/Allergies: Does not bruise/bleed easily.   Psychiatric/Behavioral:  Negative for depression and substance abuse. The patient is not nervous/anxious.      OBJECTIVE:     Vital Signs and IO (Last 24H):  Temp:  [97.1 °F (36.2 °C)-98.2 °F (36.8 °C)]   Pulse:  [65-71]   Resp:  [13-20]   BP: ()/(51-65)   SpO2:  [92 %-100 %]   I/O last 3 completed shifts:  In: 575 [P.O.:75; Other:500]  Out: 2500 [Other:2500]    Wt Readings from Last 5 Encounters:   01/18/22 68.3 kg (150 lb 9.2 oz)   09/15/21 63 kg (139 lb)   05/24/21 63 kg (139 lb)   05/19/21 66.7 kg (147 lb 0.8 oz)   05/13/21 63.5 kg (139 lb 15.9 oz)     Physical Exam:  Physical Exam  Constitutional:       Appearance: She is well-developed. She is not diaphoretic.   HENT:      Head: Normocephalic and atraumatic.   Eyes:      General: No scleral icterus.     Pupils: Pupils are equal, round, and reactive to light.   Cardiovascular:      Rate and Rhythm: Normal rate and regular rhythm.   Pulmonary:      Effort: Pulmonary effort is normal. No respiratory distress.      Breath sounds: No stridor.   Abdominal:      General: There is no distension.      Palpations: Abdomen is soft.   Musculoskeletal:         General: No deformity. Normal range of motion.      Cervical back: Neck supple.   Skin:     General: Skin is warm and dry.      Findings: No erythema or rash.   Neurological:      Mental Status: She is alert and oriented to person, place, and time.      Cranial Nerves: No cranial nerve deficit.   Psychiatric:         Behavior: Behavior normal.       Body mass index is 23.24 kg/m².    Laboratory:  Recent Labs   Lab 01/16/22  1115 01/17/22  0438 01/18/22  0412    136 135*   K 4.0 4.7 4.4    104 104   CO2 23 21* 20*   BUN 29* 32* 36*   CREATININE 5.7* 6.1* 6.7*   ESTGFRAFRICA 8.0* 7.4* 6.6*   EGFRNONAA 6.9* 6.4* 5.7*   GLU 95 67* 79       Recent Labs   Lab 01/16/22  1115 01/17/22  0438 01/18/22  0412   CALCIUM 8.3* 8.0* 8.2*   ALBUMIN 3.1*  --   --    PHOS  --  6.4* 6.6*   MG 1.6 1.5* 1.9       Recent Labs   Lab 09/30/19  0886  11/15/19  0748   PTH, Intact 176.4 H 238.4 H       No results for input(s): POCTGLUCOSE in the last 168 hours.    Recent Labs   Lab 11/15/19  0748 01/22/20  0848 05/25/21  0847   Hemoglobin A1C Invalid A SEE COMMENT 4.6       Recent Labs   Lab 01/16/22  1115 01/16/22  1115 01/17/22  0438 01/18/22  0412 01/19/22  0325   WBC 7.12   < > 7.64 5.53 4.28   HGB 9.4*   < > 9.6* 9.7* 9.5*   HCT 29.7*   < > 30.9* 30.7* 30.6*   *   < > 145* 128* 136*   *   < > 120* 118* 117*   MCHC 31.6*   < > 31.1* 31.6* 31.0*   MONO 5.1  0.4  --   --   --   --     < > = values in this interval not displayed.       Recent Labs   Lab 01/16/22  1115   BILITOT 0.8   PROT 6.0   ALBUMIN 3.1*   ALKPHOS 98   ALT 10   AST 12       Recent Labs   Lab 12/09/20  2323 12/09/20  2323 03/15/21  1533 05/17/21  1819 01/18/22  0331   Color, UA Yellow   < > Yellow Yellow Yellow   Appearance, UA Cloudy A   < > Cloudy A Hazy A Clear   pH, UA 6.0   < > 7.0 8.0 7.0   Specific [place], UA 1.020   < > 1.015 1.015 1.010   Protein, UA 1+ A   < > 2+ A 2+ A Trace A   Glucose, UA Negative   < > Negative Negative Negative   Ketones, UA Negative   < > Negative Negative Negative   Urobilinogen, UA Negative   < > Negative Negative Negative   Bilirubin (UA) Negative   < > Negative Negative Negative   Occult Blood UA 3+ A   < > 2+ A 2+ A 2+ A   Nitrite, UA Negative   < > Negative Negative Negative   RBC, UA 1   < > 21 H 7 H 10 H   WBC, UA >100 H   < > >100 H 80 H 50 H   Bacteria Negative  --  Negative Few A  --    Hyaline Casts, UA 10 A  --  40 A 0  --     < > = values in this interval not displayed.       Recent Labs   Lab 03/16/20  1551 12/08/20 2024   POC PH 7.402 7.370   POC PCO2 39.9 39.0   POC HCO3 24.9 22.5 L   POC PO2 58 LL 85   POC SATURATED O2 90 L 96   POC BE 0 -3   Sample ARTERIAL ARTERIAL       Microbiology Results (last 7 days)     Procedure Component Value Units Date/Time    Blood culture #1 **CANNOT BE ORDERED STAT** [[id number]] Collected:  01/16/22 1115    Order Status: Completed Specimen: Blood from Peripheral, Antecubital, Right Updated: 01/19/22 1232     Blood Culture, Routine No Growth to date      No Growth to date      No Growth to date      No Growth to date    Urine culture [[id number]] Collected: 01/18/22 0331    Order Status: Completed Specimen: Urine Updated: 01/19/22 0640     Urine Culture, Routine No growth to date    Narrative:      Specimen Source->Urine    Blood culture #2 **CANNOT BE ORDERED STAT** [[id number]] Collected: 01/16/22 1130    Order Status: Completed Specimen: Blood from Peripheral, Antecubital, Right Updated: 01/18/22 1432     Blood Culture, Routine No Growth to date      No Growth to date      No Growth to date        ASSESSMENT/PLAN:     ESRD on HD MWF via AVF  Continue current dialysis prescription.  Next HD per schedule.  Renal diet - low K, low phos.  No IVs or BP checks on access and/or non-dominant arm.     Anemia of CKD  Hgb and HCT are acceptable. Monitor.  Will provide ADAM and/or IV iron PRN.    MBD / Secondary HPT  Ca, phos, PTH and vitamin D levels are acceptable.   Phos binders, vitamin D analogues and calcimimetics as needed.    HTN  BP seem controlled.   Tolerate asymptomatic HTN up to -160.  Continue home meds.  Low sodium diet.    Thank you for allowing us to participate in the care of your patient!   We will follow the patient and provide recommendations as needed.    Patient care time was spent personally by me on the following activities:   · Obtaining a history.  · Examination of patient.  · Providing medical care at the patients bedside.  · Developing a treatment plan with patient or surrogate and bedside caregivers.  · Ordering and reviewing laboratory studies, radiographic studies, pulse oximetry.  · Ordering and performing treatments and interventions.  · Evaluation of patient's response to treatment.  · Discussions with consultants while on the unit and immediately available to the  patient.  · Re-evaluation of the patient's condition.  · Documentation in the medical record.     Raúl Dietz MD    [facility]  [address] [place] [address]  [place], [place] [id number] [phone] - tel  [phone] - fax    1/19/2022

## 2022-01-19 NOTE — ANESTHESIA POSTPROCEDURE EVALUATION
Anesthesia Post Evaluation    Patient: Shara Hutchins    Procedure(s) Performed: Procedure(s) (LRB):  CYSTOURETEROSCOPY, WITH RETROGRADE PYELOGRAM AND URETERAL STENT INSERTION (Right)    Final Anesthesia Type: general      Patient location during evaluation: PACU  Patient participation: Yes- Able to Participate  Level of consciousness: awake and alert, oriented and awake  Post-procedure vital signs: reviewed and stable  Pain management: adequate  Airway patency: patent    PONV status at discharge: No PONV  Anesthetic complications: no      Cardiovascular status: blood pressure returned to baseline, hemodynamically stable and stable  Respiratory status: unassisted, spontaneous ventilation and room air  Hydration status: euvolemic  Follow-up not needed.          Vitals Value Taken Time   /60 01/18/22 1248   Temp 36.2 °C (97.1 °F) 01/18/22 1248   Pulse 71 01/18/22 1248   Resp 18 01/18/22 1248   SpO2 97 % 01/18/22 1248         Event Time   Out of Recovery 01/18/2022 12:48:00         Pain/Kvng Score: Pain Rating Prior to Med Admin: 5 (1/18/2022 12:11 PM)  Pain Rating Post Med Admin: 4 (1/17/2022  3:22 PM)  Kvng Score: 10 (1/18/2022 12:45 PM)

## 2022-01-19 NOTE — PROGRESS NOTES
[facility] Medicine  Progress Note    Patient Name: Shara Hutchins  MRN: [id number]  Patient Class: IP- Inpatient   Admission Date: 1/16/2022  Length of Stay: 2 days  Attending Physician: No att. providers found  Primary Care Provider: April Horton MD        Subjective:     Principal Problem:Vulvar cellulitis        HPI:  Patient presents to the emergency department with vulvar cellulitis that is recurrent.  The patient states that about 1 week ago she developed itching, redness, increased pain to the vulva.  She has a pertinent past medical history of fall vol cancer status post radiation and has since had recurrent episodes of cellulitis in the area.  She reports applying different types of creams such as Neosporin, Reginald Butt paste, calseptin, Aquaphor, and others with no improvement and about 3 days ago the symptoms worsened.  Her history is also complicated by chronic diarrhea since being treated with radiation in which she has loose incontinent stools multiple times per day.     The patient's current problem of cellulitis begins at superior-most aspect of her vulva and extends across the entire perineum to between the gluteal folds. The cellulitis also extends into the groin bilaterally.  The patient's pain has progressed to severe, nonradiating, described as burning, itching and dull.  Unrelieved by any home analgesics or topical medications.  Associated with intermittent low grade fever (not measured at home), occasional bleeding from excoriations of the skin, and hardened swelling of the affected area.  Or she also has intermittent nausea she believes is associated with the severe pain.  No vomiting.  The patient has been evaluated and treated by Dr. Blanca Neal in the past.  The patient was treated with meropenem and vancomycin in the emergency department.  PCN allergy noted and the patient reported tolerating meropenem and the past.     Imaging obtained in the emergency  "department incidentally revealed an obstructive, right-sided nephrolithiasis by 6 mm kidney stone that is causing perinephric fat stranding/hydronephrosis..  She has a history of nephrolithiasis and a CT.  The patient did receive IV contrast in the emergency department for this imaging and has hemodialysis 3 times a week overseen by Dr. Dietz.  The patient denied flank pain, hematuria, increased urination or urgency.  She does have a history of anuria.      Overview/Hospital Course:  01/18  Assumed care. Chart reviewed. Patient seen at [facility]: after cystoscopy/stent. Feels "Good". Labs reviewed and noted below: no leukocytosis with stable macrocytic anemia; trivial hyponatremia with end stage renal dysfunction      Interval History: stable hoopefully discharge in AM    Review of Systems   Constitutional: Positive for fatigue.   HENT: Negative.    Eyes: Negative.    Respiratory: Negative.    Cardiovascular: Negative.    Gastrointestinal: Negative.    Endocrine: Negative.    Genitourinary: Negative.    Musculoskeletal: Negative.    Skin: Negative.    Allergic/Immunologic: Negative.    Neurological: Negative.    Hematological: Negative.    All other systems reviewed and are negative.    Objective:     Vital Signs (Most Recent):  Temp: 97.1 °F (36.2 °C) (01/18/22 1730)  Pulse: 71 (01/18/22 1730)  Resp: 18 (01/18/22 1730)  BP: 110/60 (01/18/22 1730)  SpO2: 97 % (01/18/22 1730) Vital Signs (24h Range):  Temp:  [97.1 °F (36.2 °C)-101.1 °F (38.4 °C)] 97.1 °F (36.2 °C)  Pulse:  [64-78] 71  Resp:  [11-20] 18  SpO2:  [91 %-100 %] 97 %  BP: ()/(55-70) 110/60     Weight: 68.3 kg (150 lb 9.2 oz)  Body mass index is 23.24 kg/m².    Intake/Output Summary (Last 24 hours) at 1/18/2022 1818  Last data filed at 1/18/2022 1730  Gross per 24 hour   Intake 575 ml   Output 2500 ml   Net -1925 ml      Physical Exam  Vitals and nursing note reviewed.   Constitutional:       Appearance: She is well-developed and well-nourished.   HENT:     "  Head: Normocephalic and atraumatic.      Right Ear: External ear normal.      Left Ear: External ear normal.      Nose: Nose normal.      Mouth/Throat:      Mouth: Oropharynx is clear and moist.   Eyes:      Extraocular Movements: EOM normal.      Conjunctiva/sclera: Conjunctivae normal.      Pupils: Pupils are equal, round, and reactive to light.   Cardiovascular:      Rate and Rhythm: Normal rate and regular rhythm.      Pulses: Intact distal pulses.      Heart sounds: Normal heart sounds.   Pulmonary:      Effort: Pulmonary effort is normal.      Breath sounds: Normal breath sounds.   Abdominal:      General: Bowel sounds are normal.      Palpations: Abdomen is soft.   Musculoskeletal:         General: Normal range of motion.      Cervical back: Normal range of motion and neck supple.   Skin:     General: Skin is warm and dry.      Capillary Refill: Capillary refill takes less than 2 seconds.   Neurological:      Mental Status: She is alert and oriented to person, place, and time.   Psychiatric:         Mood and Affect: Mood and affect normal.         Behavior: Behavior normal.         Thought Content: Thought content normal.         Judgment: Judgment normal.         Significant Labs:   All pertinent labs within the past 24 hours have been reviewed.  BMP:   Recent Labs   Lab 01/18/22 0412   GLU 79   *   K 4.4      CO2 20*   BUN 36*   CREATININE 6.7*   CALCIUM 8.2*   MG 1.9     CBC:   Recent Labs   Lab 01/17/22  0438 01/18/22  0412   WBC 7.64 5.53   HGB 9.6* 9.7*   HCT 30.9* 30.7*   * 128*       Significant Imaging: I have reviewed all pertinent imaging results/findings within the past 24 hours.      Assessment/Plan:      No notes have been filed under this hospital service.  Service: Hospital Medicine    VTE Risk Mitigation (From admission, onward)         Ordered     enoxaparin injection 30 mg  Daily         01/16/22 1502     IP VTE HIGH RISK PATIENT  Once         01/16/22 1502     Place  sequential compression device  Until discontinued         01/16/22 1502                Discharge Planning   RODRIGO: 1/19/2022     Code Status: Full Code   Is the patient medically ready for discharge?:     Reason for patient still in hospital (select all that apply): Patient trending condition and Treatment  Discharge Plan A: Assisted Living                  Ashwin Olson MD  Department of Hospital Medicine   [facility]

## 2022-01-19 NOTE — SUBJECTIVE & OBJECTIVE
Interval History: stable hoopefully discharge in AM    Review of Systems   Constitutional: Positive for fatigue.   HENT: Negative.    Eyes: Negative.    Respiratory: Negative.    Cardiovascular: Negative.    Gastrointestinal: Negative.    Endocrine: Negative.    Genitourinary: Negative.    Musculoskeletal: Negative.    Skin: Negative.    Allergic/Immunologic: Negative.    Neurological: Negative.    Hematological: Negative.    All other systems reviewed and are negative.    Objective:     Vital Signs (Most Recent):  Temp: 97.1 °F (36.2 °C) (01/18/22 1730)  Pulse: 71 (01/18/22 1730)  Resp: 18 (01/18/22 1730)  BP: 110/60 (01/18/22 1730)  SpO2: 97 % (01/18/22 1730) Vital Signs (24h Range):  Temp:  [97.1 °F (36.2 °C)-101.1 °F (38.4 °C)] 97.1 °F (36.2 °C)  Pulse:  [64-78] 71  Resp:  [11-20] 18  SpO2:  [91 %-100 %] 97 %  BP: ()/(55-70) 110/60     Weight: 68.3 kg (150 lb 9.2 oz)  Body mass index is 23.24 kg/m².    Intake/Output Summary (Last 24 hours) at 1/18/2022 1818  Last data filed at 1/18/2022 1730  Gross per 24 hour   Intake 575 ml   Output 2500 ml   Net -1925 ml      Physical Exam  Vitals and nursing note reviewed.   Constitutional:       Appearance: She is well-developed and well-nourished.   HENT:      Head: Normocephalic and atraumatic.      Right Ear: External ear normal.      Left Ear: External ear normal.      Nose: Nose normal.      Mouth/Throat:      Mouth: Oropharynx is clear and moist.   Eyes:      Extraocular Movements: EOM normal.      Conjunctiva/sclera: Conjunctivae normal.      Pupils: Pupils are equal, round, and reactive to light.   Cardiovascular:      Rate and Rhythm: Normal rate and regular rhythm.      Pulses: Intact distal pulses.      Heart sounds: Normal heart sounds.   Pulmonary:      Effort: Pulmonary effort is normal.      Breath sounds: Normal breath sounds.   Abdominal:      General: Bowel sounds are normal.      Palpations: Abdomen is soft.   Musculoskeletal:         General:  Normal range of motion.      Cervical back: Normal range of motion and neck supple.   Skin:     General: Skin is warm and dry.      Capillary Refill: Capillary refill takes less than 2 seconds.   Neurological:      Mental Status: She is alert and oriented to person, place, and time.   Psychiatric:         Mood and Affect: Mood and affect normal.         Behavior: Behavior normal.         Thought Content: Thought content normal.         Judgment: Judgment normal.         Significant Labs:   All pertinent labs within the past 24 hours have been reviewed.  BMP:   Recent Labs   Lab 01/18/22 0412   GLU 79   *   K 4.4      CO2 20*   BUN 36*   CREATININE 6.7*   CALCIUM 8.2*   MG 1.9     CBC:   Recent Labs   Lab 01/17/22  0438 01/18/22 0412   WBC 7.64 5.53   HGB 9.6* 9.7*   HCT 30.9* 30.7*   * 128*       Significant Imaging: I have reviewed all pertinent imaging results/findings within the past 24 hours.

## 2022-01-19 NOTE — HOSPITAL COURSE
"01/18  Assumed care. Chart reviewed. Patient seen at [facility]RT: after cystoscopy/stent. Feels "Good". Labs reviewed and noted below: no leukocytosis with stable macrocytic anemia; trivial hyponatremia with end stage renal dysfunction    01/19  Had stent re-do today, followed by RRT. Only complaint is vaginitis is persisting. Feels "Good" and would like to be discharged when cleared by Renal and ID. Labs reviewed and noted below: no leukocytosis with stable macrocytic anemia. Continue current regimen.     01/20  Discussed with Wound Care: to make poultice with Aquaphor and Questran for vaginal topical application. Vaginal area continues to be source of great discomfort. For RRT in AM. Lab holiday today. Ordered for AM before RRT    01/21  Discussed with Vascular and Renal: may need to allow fistula to rest--RRT RN had difficult time cannulating fistula--but it is functioning properly. Vaginitis has improved with current therapy. Discussed with : to order Home Health at discharge for poultice application and vaginitis follow-up after discharge. Will monitor over weekend and see how difficult fistula is at RRT on 01/224. Plan: continue current regimen; lab holiday in AM.    01/22  Discussed again with Vascular and Renal: will have catheter placed to give fistula some rest time. Patient feels "OK". Vaginal area is less painful with current regimen. Lab holiday today. Patient request cardiac monitoring to be discontinued--ordered.    01/23  Resting today. No new complaints. Vaginal topically feels "Better" after poultice application. Waiting for dialysis catheter (see 01/22 note); expect next 24-48 hrs to place--may need to use fistula in AM. Lbs reviewed and noted below: no leukocytosis with stable macrocytic anemia; electrolytes normal with end stage renal dysfunction.  Plan: continue current course/regimen; have dialysis catheter placed (see above); AM labs for review    01/24  Consultants' attendance noted and " "appreciated. For HD catheter placement--see above (hopefully today) to allow stent in fistula to mature, Will need RRT as well. Topically, vaginal area feels "Much better" with poultice application.   Plan: Continue current regimen: RRT on M,W,F; AM labs for review; hopefully discharge soon    01/25: Feeling well, she is pending placement of vascular access planned for today with plans for dialysis this afternoon. Hope to go home tomorrow. No other complaints at this time.   "

## 2022-01-19 NOTE — NURSING
Assessed patient - patient alert and oriented - patient on way to dialysis - vital signs within normal parameters - no complaints of pain at this time - no visible signs or symptoms of distress - will continue to monitor - BULMARO Ball RN

## 2022-01-20 NOTE — NURSING
Assessed patient - alert and oriented - up ad-david - vital signs within normal parameters - no complaints of pain at this time - no fluids infusing - no visible signs or symptoms of distress - will continue to monitor - possible discharge today - S CHERRY Ball

## 2022-01-20 NOTE — SUBJECTIVE & OBJECTIVE
Interval History: persisting vaginitis discomfort    Review of Systems   Constitutional: Positive for fatigue.   HENT: Negative.    Eyes: Negative.    Respiratory: Negative.    Cardiovascular: Negative.    Gastrointestinal: Negative.    Endocrine: Negative.    Genitourinary: Positive for genital sores and vaginal pain.   Musculoskeletal: Negative.    Skin: Negative.    Allergic/Immunologic: Negative.    Neurological: Negative.    Hematological: Negative.    All other systems reviewed and are negative.    Objective:     Vital Signs (Most Recent):  Temp: 97.7 °F (36.5 °C) (01/20/22 1201)  Pulse: 74 (01/20/22 1201)  Resp: 18 (01/20/22 1201)  BP: 112/65 (01/20/22 1201)  SpO2: 98 % (01/20/22 1201) Vital Signs (24h Range):  Temp:  [97.7 °F (36.5 °C)-98 °F (36.7 °C)] 97.7 °F (36.5 °C)  Pulse:  [57-74] 74  Resp:  [18] 18  SpO2:  [91 %-100 %] 98 %  BP: ()/(49-66) 112/65     Weight: 68.3 kg (150 lb 9.2 oz)  Body mass index is 23.24 kg/m².    Intake/Output Summary (Last 24 hours) at 1/20/2022 1358  Last data filed at 1/19/2022 1630  Gross per 24 hour   Intake 500 ml   Output 1500 ml   Net -1000 ml      Physical Exam  Vitals and nursing note reviewed.   Constitutional:       Appearance: She is well-developed and well-nourished.   HENT:      Head: Normocephalic and atraumatic.      Right Ear: External ear normal.      Left Ear: External ear normal.      Nose: Nose normal.      Mouth/Throat:      Mouth: Oropharynx is clear and moist.   Eyes:      Extraocular Movements: EOM normal.      Conjunctiva/sclera: Conjunctivae normal.      Pupils: Pupils are equal, round, and reactive to light.   Cardiovascular:      Rate and Rhythm: Normal rate and regular rhythm.      Pulses: Intact distal pulses.      Heart sounds: Normal heart sounds.      Comments: Good thrill LUE  Pulmonary:      Effort: Pulmonary effort is normal.      Breath sounds: Normal breath sounds.   Abdominal:      General: Bowel sounds are normal.      Palpations:  Abdomen is soft.   Musculoskeletal:         General: Normal range of motion.      Cervical back: Normal range of motion and neck supple.   Skin:     General: Skin is warm and dry.      Capillary Refill: Capillary refill takes less than 2 seconds.      Comments: LUE: large ecchymotic area, starting to yellow   Neurological:      Mental Status: She is alert and oriented to person, place, and time.   Psychiatric:         Mood and Affect: Mood and affect normal.         Behavior: Behavior normal.         Thought Content: Thought content normal.         Judgment: Judgment normal.         Significant Labs:   All pertinent labs within the past 24 hours have been reviewed.  BMP: No results for input(s): GLU, NA, K, CL, CO2, BUN, CREATININE, CALCIUM, MG in the last 48 hours.  CBC:   Recent Labs   Lab 01/19/22  0325   WBC 4.28   HGB 9.5*   HCT 30.6*   *       Significant Imaging: I have reviewed all pertinent imaging results/findings within the past 24 hours.

## 2022-01-20 NOTE — PROGRESS NOTES
[facility] Medicine  Progress Note    Patient Name: Shara Hutchins  MRN: [id number]  Patient Class: IP- Inpatient   Admission Date: 1/16/2022  Length of Stay: 4 days  Attending Physician: Ashwin Olson MD  Primary Care Provider: April Horton MD        Subjective:     Principal Problem:ESRD on dialysis        HPI:  Patient presents to the emergency department with vulvar cellulitis that is recurrent.  The patient states that about 1 week ago she developed itching, redness, increased pain to the vulva.  She has a pertinent past medical history of fall vol cancer status post radiation and has since had recurrent episodes of cellulitis in the area.  She reports applying different types of creams such as Neosporin, Reginald Butt paste, calseptin, Aquaphor, and others with no improvement and about 3 days ago the symptoms worsened.  Her history is also complicated by chronic diarrhea since being treated with radiation in which she has loose incontinent stools multiple times per day.     The patient's current problem of cellulitis begins at superior-most aspect of her vulva and extends across the entire perineum to between the gluteal folds. The cellulitis also extends into the groin bilaterally.  The patient's pain has progressed to severe, nonradiating, described as burning, itching and dull.  Unrelieved by any home analgesics or topical medications.  Associated with intermittent low grade fever (not measured at home), occasional bleeding from excoriations of the skin, and hardened swelling of the affected area.  Or she also has intermittent nausea she believes is associated with the severe pain.  No vomiting.  The patient has been evaluated and treated by Dr. Blanca Neal in the past.  The patient was treated with meropenem and vancomycin in the emergency department.  PCN allergy noted and the patient reported tolerating meropenem and the past.     Imaging obtained in the emergency department  "incidentally revealed an obstructive, right-sided nephrolithiasis by 6 mm kidney stone that is causing perinephric fat stranding/hydronephrosis..  She has a history of nephrolithiasis and a CT.  The patient did receive IV contrast in the emergency department for this imaging and has hemodialysis 3 times a week overseen by Dr. Dietz.  The patient denied flank pain, hematuria, increased urination or urgency.  She does have a history of anuria.      Overview/Hospital Course:  01/18  Assumed care. Chart reviewed. Patient seen at [facility]: after cystoscopy/stent. Feels "Good". Labs reviewed and noted below: no leukocytosis with stable macrocytic anemia; trivial hyponatremia with end stage renal dysfunction    01/19  Had stent re-do today, followed by RRT. Only complaint is vaginitis is persisting. Feels "Good" and would like to be discharged when cleared by Renal and ID. Labs reviewed and noted below: no leukocytosis with stable macrocytic anemia. Continue current regimen.     01/20  Discussed with Wound Care: to make poultice with Aquaphor and Questran for vaginal topical application. Vaginal area continues to be source of great discomfort. For RRT in AM. Lab holiday today. Ordered for AM before RRT      Interval History: persisting vaginitis discomfort    Review of Systems   Constitutional: Positive for fatigue.   HENT: Negative.    Eyes: Negative.    Respiratory: Negative.    Cardiovascular: Negative.    Gastrointestinal: Negative.    Endocrine: Negative.    Genitourinary: Positive for genital sores and vaginal pain.   Musculoskeletal: Negative.    Skin: Negative.    Allergic/Immunologic: Negative.    Neurological: Negative.    Hematological: Negative.    All other systems reviewed and are negative.    Objective:     Vital Signs (Most Recent):  Temp: 97.7 °F (36.5 °C) (01/20/22 1201)  Pulse: 74 (01/20/22 1201)  Resp: 18 (01/20/22 1201)  BP: 112/65 (01/20/22 1201)  SpO2: 98 % (01/20/22 1201) Vital Signs (24h Range):  Temp:  " [97.7 °F (36.5 °C)-98 °F (36.7 °C)] 97.7 °F (36.5 °C)  Pulse:  [57-74] 74  Resp:  [18] 18  SpO2:  [91 %-100 %] 98 %  BP: ()/(49-66) 112/65     Weight: 68.3 kg (150 lb 9.2 oz)  Body mass index is 23.24 kg/m².    Intake/Output Summary (Last 24 hours) at 1/20/2022 1358  Last data filed at 1/19/2022 1630  Gross per 24 hour   Intake 500 ml   Output 1500 ml   Net -1000 ml      Physical Exam  Vitals and nursing note reviewed.   Constitutional:       Appearance: She is well-developed and well-nourished.   HENT:      Head: Normocephalic and atraumatic.      Right Ear: External ear normal.      Left Ear: External ear normal.      Nose: Nose normal.      Mouth/Throat:      Mouth: Oropharynx is clear and moist.   Eyes:      Extraocular Movements: EOM normal.      Conjunctiva/sclera: Conjunctivae normal.      Pupils: Pupils are equal, round, and reactive to light.   Cardiovascular:      Rate and Rhythm: Normal rate and regular rhythm.      Pulses: Intact distal pulses.      Heart sounds: Normal heart sounds.      Comments: Good thrill LUE  Pulmonary:      Effort: Pulmonary effort is normal.      Breath sounds: Normal breath sounds.   Abdominal:      General: Bowel sounds are normal.      Palpations: Abdomen is soft.   Musculoskeletal:         General: Normal range of motion.      Cervical back: Normal range of motion and neck supple.   Skin:     General: Skin is warm and dry.      Capillary Refill: Capillary refill takes less than 2 seconds.      Comments: LUE: large ecchymotic area, starting to yellow   Neurological:      Mental Status: She is alert and oriented to person, place, and time.   Psychiatric:         Mood and Affect: Mood and affect normal.         Behavior: Behavior normal.         Thought Content: Thought content normal.         Judgment: Judgment normal.         Significant Labs:   All pertinent labs within the past 24 hours have been reviewed.  BMP: No results for input(s): GLU, NA, K, CL, CO2, BUN,  CREATININE, CALCIUM, MG in the last 48 hours.  CBC:   Recent Labs   Lab 01/19/22  0325   WBC 4.28   HGB 9.5*   HCT 30.6*   *       Significant Imaging: I have reviewed all pertinent imaging results/findings within the past 24 hours.      Assessment/Plan:      No notes have been filed under this hospital service.  Service: Hospital Medicine    VTE Risk Mitigation (From admission, onward)         Ordered     enoxaparin injection 30 mg  Daily         01/16/22 1502     IP VTE HIGH RISK PATIENT  Once         01/16/22 1502     Place sequential compression device  Until discontinued         01/16/22 1502                Discharge Planning   RODRIGO: 1/21/2022     Code Status: Full Code   Is the patient medically ready for discharge?:     Reason for patient still in hospital (select all that apply): Patient trending condition and Treatment  Discharge Plan A: Assisted Living                  Ashwin Olson MD  Department of Hospital Medicine   [facility]

## 2022-01-20 NOTE — PROGRESS NOTES
Pharmacokinetic Assessment Follow Up: IV Vancomycin    Patient brief summary:  Shara Hutchins is a 71 y.o. female initiated on antimicrobial therapy with IV Vancomycin for treatment of Skin & Soft Tissue infection    The patient's current regimen is intermittent dosing based on random levels and HD schedule      Actual Body Weight = 68.3 kg (150 lb 9.2 oz).    Renal Function:   Estimated Creatinine Clearance: 7.6 mL/min (A) (based on SCr of 6.7 mg/dL (H)).      Vancomycin serum concentration assessment(s):    The random level was drawn correctly and can be used to guide therapy at this time. The measurement is within the desired definitive target range of 10 to 15 mcg/mL.    Vancomycin Regimen Plan:    Re-dose with 1250 mg IV x 1 dose. Next random level to be drawn at am labs on sat 1/22 following HD on fri 1/21    Drug levels (last 3 results):  Recent Labs   Lab Result Units 01/17/22  2041 01/20/22  0429   Vancomycin, Random ug/mL 8.1 13.6          Dialysis Method (if applicable):  intermittent HD- MWF      Pharmacy will continue to follow and monitor vancomycin.    Please contact pharmacy at extension [phone] for questions regarding this assessment.    Thank you for the consult,   Raúl Hill

## 2022-01-20 NOTE — PLAN OF CARE
Problem: Adult Inpatient Plan of Care  Goal: Plan of Care Review  Outcome: Ongoing, Progressing  Goal: Patient-Specific Goal (Individualized)  Outcome: Ongoing, Progressing  Goal: Absence of Hospital-Acquired Illness or Injury  Outcome: Ongoing, Progressing  Goal: Optimal Comfort and Wellbeing  Outcome: Ongoing, Progressing  Goal: Readiness for Transition of Care  Outcome: Ongoing, Progressing     Problem: Adjustment to Illness (Sepsis/Septic Shock)  Goal: Optimal Coping  Outcome: Ongoing, Progressing     Problem: Bleeding (Sepsis/Septic Shock)  Goal: Absence of Bleeding  Outcome: Ongoing, Progressing     Problem: Glycemic Control Impaired (Sepsis/Septic Shock)  Goal: Blood Glucose Level Within Desired Range  Outcome: Ongoing, Progressing     Problem: Infection Progression (Sepsis/Septic Shock)  Goal: Absence of Infection Signs and Symptoms  Outcome: Ongoing, Progressing     Problem: Nutrition Impaired (Sepsis/Septic Shock)  Goal: Optimal Nutrition Intake  Outcome: Ongoing, Progressing     Problem: Fluid and Electrolyte Imbalance (Acute Kidney Injury/Impairment)  Goal: Fluid and Electrolyte Balance  Outcome: Ongoing, Progressing     Problem: Oral Intake Inadequate (Acute Kidney Injury/Impairment)  Goal: Optimal Nutrition Intake  Outcome: Ongoing, Progressing     Problem: Renal Function Impairment (Acute Kidney Injury/Impairment)  Goal: Effective Renal Function  Outcome: Ongoing, Progressing     Problem: Fluid Imbalance (Pneumonia)  Goal: Fluid Balance  Outcome: Ongoing, Progressing     Problem: Infection (Pneumonia)  Goal: Resolution of Infection Signs and Symptoms  Outcome: Ongoing, Progressing     Problem: Respiratory Compromise (Pneumonia)  Goal: Effective Oxygenation and Ventilation  Outcome: Ongoing, Progressing     Problem: Fall Injury Risk  Goal: Absence of Fall and Fall-Related Injury  Outcome: Ongoing, Progressing     Problem: Device-Related Complication Risk (Hemodialysis)  Goal: Safe, Effective Therapy  Delivery  Outcome: Ongoing, Progressing     Problem: Hemodynamic Instability (Hemodialysis)  Goal: Effective Tissue Perfusion  Outcome: Ongoing, Progressing     Problem: Infection (Hemodialysis)  Goal: Absence of Infection Signs and Symptoms  Outcome: Ongoing, Progressing

## 2022-01-20 NOTE — CONSULTS
Nephrology Consult Note        Patient Name: Shara Hutchins  MRN: [id number]    Patient Class: IP- Inpatient   Admission Date: 1/16/2022  Length of Stay: 4 days  Date of Service: 1/20/2022    Attending Physician: Ashwin Olson MD  Primary Care Provider: April Horton MD    Reason for Consult: esrd/anemia/htn/shpt/vulvar cellulitis    SUBJECTIVE:     HPI: ESRD patient on HD MWF presents to the emergency department with recurrent vulvar cellulitis, complicated by chronic diarrhea since being treated with radiation. Unrelieved by any home analgesics or topical medications. Associated with intermittent low grade fever, occasional bleeding from excoriations of the skin, and hardened swelling of the affected area. She has intermittent nausea associated with the severe pain.     Imaging obtained in the emergency department incidentally revealed an obstructive, right-sided nephrolithiasis by 6 mm kidney stone causing perinephric fat stranding/hydronephrosis. The patient denied flank pain, hematuria, increased urination or urgency.    1/17  AVF non-functioning.  Consult Vascular surgery for trialysis catheter and avf evaluation  1/18  Good avf flow on ultrasound.  At a procedure  1/19  Seen on dialysis.  No cp or sob.  AVF sore  1/20  Sleeping.  No distress.       Past Medical History:   Diagnosis Date    Cellulitis of trunk     Perineum, numerous episodes    Chronic diarrhea 02/10/2020    Short-gut syndrome    Diabetes mellitus, type 2     pt has not had for years after losing weight    End stage renal disease on dialysis 01/07/2018    Fatigue     Hypertension     Hypothyroidism 2/10/2020    Iron deficiency anemia due to chronic blood loss 1/7/2018    Pulmonary hypertension 3/20/2020    Vulvar cancer 1/7/2019     Past Surgical History:   Procedure Laterality Date    ABDOMINAL SURGERY      APPENDECTOMY      AV FISTULA PLACEMENT Left 2018    BACK SURGERY      BLADDER FULGURATION N/A 6/16/2020    Procedure:  FULGURATION, BLADDER;  Surgeon: Damari Barber Jr., MD;  Location: [facility] OR;  Service: Urology;  Laterality: N/A;    carpel tunnel surgery once on each hand      CERVICAL FUSION      x 4    cervix repair      CHOLECYSTECTOMY  2000    COLECTOMY      CYSTOSCOPY W/ RETROGRADES  6/16/2020    Procedure: CYSTOSCOPY, WITH RETROGRADE PYELOGRAM;  Surgeon: Damari Barber Jr., MD;  Location: [facility] OR;  Service: Urology;;    CYSTOSCOPY W/ RETROGRADES Right 4/20/2021    Procedure: CYSTOSCOPY, WITH RETROGRADE PYELOGRAM;  Surgeon: Damari Barber Jr., MD;  Location: [facility] OR;  Service: Urology;  Laterality: Right;    CYSTOSCOPY W/ URETERAL STENT PLACEMENT Right 3/16/2021    Procedure: CYSTOSCOPY, WITH URETERAL STENT INSERTION;  Surgeon: Damari Barber Jr., MD;  Location: [facility] OR;  Service: Urology;  Laterality: Right;    CYSTOSCOPY W/ URETERAL STENT PLACEMENT Right 5/18/2021    Procedure: CYSTOSCOPY, WITH URETERAL STENT INSERTION;  Surgeon: Damari Barber Jr., MD;  Location: [facility] OR;  Service: Urology;  Laterality: Right;    CYSTOSCOPY W/ URETERAL STENT REMOVAL Right 4/20/2021    Procedure: CYSTOSCOPY, WITH URETERAL STENT REMOVAL;  Surgeon: Damari Barber Jr., MD;  Location: [facility] OR;  Service: Urology;  Laterality: Right;    CYSTOSCOPY WITH URETEROSCOPY, RETROGRADE PYELOGRAPHY, AND INSERTION OF STENT Right 3/30/2021    Procedure: CYSTOSCOPY, WITH RETROGRADE PYELOGRAM AND URETERAL STENT INSERTION;  Surgeon: Damari Barber Jr., MD;  Location: [facility] OR;  Service: Urology;  Laterality: Right;    CYSTOURETEROSCOPY WITH RETROGRADE PYELOGRAPHY AND INSERTION OF STENT INTO URETER Right 1/18/2022    Procedure: CYSTOURETEROSCOPY, WITH RETROGRADE PYELOGRAM AND URETERAL STENT INSERTION;  Surgeon: Damari Barber Jr., MD;  Location: [facility] OR;  Service: Urology;  Laterality: Right;    DIAGNOSTIC LAPAROSCOPY N/A 2/13/2020    Procedure: LAPAROSCOPY, DIAGNOSTIC;  Surgeon: Robbi Lovell III, MD;  Location: [facility] OR;  Service: General;   Laterality: N/A;    HYSTERECTOMY  1980    ILEOSTOMY N/A 2/13/2020    Procedure: CREATION, ILEOSTOMY Loop;  Surgeon: Robbi Lovell III, MD;  Location: [facility] OR;  Service: General;  Laterality: N/A;    ILEOSTOMY CLOSURE N/A 5/18/2020    Procedure: CLOSURE, ILEOSTOMY;  Surgeon: Robbi Lovell III, MD;  Location: [facility] OR;  Service: General;  Laterality: N/A;    LASER LITHOTRIPSY Right 3/30/2021    Procedure: LITHOTRIPSY, USING LASER;  Surgeon: Damari Barber Jr., MD;  Location: [facility] OR;  Service: Urology;  Laterality: Right;    LUMBAR LAMINECTOMY      LYSIS OF ADHESIONS N/A 2/13/2020    Procedure: LYSIS, ADHESIONS;  Surgeon: Robbi Lovell III, MD;  Location: [facility] OR;  Service: General;  Laterality: N/A;    NECK SURGERY      PHLEBOGRAPHY  2/28/2020    Procedure: VENOGRAM;  Surgeon: Robbi Lovell III, MD;  Location: [facility] OR;  Service: General;;    REMOVAL OF VASCULAR ACCESS PORT Right 2/13/2020    Procedure: REMOVAL, VASCULAR ACCESS PORT;  Surgeon: Robbi Lovell III, MD;  Location: [facility] OR;  Service: General;  Laterality: Right;    RETROGRADE PYELOGRAPHY  3/16/2021    Procedure: PYELOGRAM, RETROGRADE;  Surgeon: Damari Barber Jr., MD;  Location: [facility] OR;  Service: Urology;;    RETROGRADE PYELOGRAPHY Right 5/18/2021    Procedure: PYELOGRAM, RETROGRADE;  Surgeon: Damari Barber Jr., MD;  Location: [facility] OR;  Service: Urology;  Laterality: Right;    SHOULDER SURGERY      URETEROSCOPIC REMOVAL OF URETERIC CALCULUS Right 3/30/2021    Procedure: REMOVAL, CALCULUS, URETER, URETEROSCOPIC;  Surgeon: Damari Barber Jr., MD;  Location: [facility] OR;  Service: Urology;  Laterality: Right;    URETEROSCOPIC REMOVAL OF URETERIC CALCULUS Right 4/20/2021    Procedure: REMOVAL, CALCULUS, URETER, URETEROSCOPIC;  Surgeon: Damari Barber Jr., MD;  Location: [facility] OR;  Service: Urology;  Laterality: Right;    vulva cancer       Family History   Problem Relation Age of Onset    Heart disease Mother      Hypertension Mother     Cancer Mother         lung    Heart disease Father     Heart disease Sister         Open heart surgery    No Known Problems Daughter      Social History     Tobacco Use    Smoking status: Former Smoker     Packs/day: 1.00     Years: 33.00     Pack years: 33.00     Types: Cigarettes     Quit date: 2000     Years since quittin.0    Smokeless tobacco: Former User     Quit date:    Substance Use Topics    Alcohol use: No    Drug use: No       Review of patient's allergies indicates:   Allergen Reactions    Ciprofloxacin Other (See Comments)     Elevated liver enzymes      Pcn [penicillins] Anaphylaxis     TOLERATES ROCEPHIN WITHOUT DIFFICULTY       Outpatient meds:  Current Facility-Administered Medications on File Prior to Encounter   Medication Dose Route Frequency Provider Last Rate Last Admin    electrolyte-S (ISOLYTE)   Intravenous Continuous Raz Ruff MD        lactated ringers infusion   Intravenous Continuous Ruy Beltre MD        LIDOcaine (PF) 10 mg/ml (1%) injection 5 mg  0.5 mL Intradermal Once Raz Ruff MD         Current Outpatient Medications on File Prior to Encounter   Medication Sig Dispense Refill    CHOLESTYRAMINE LIGHT 4 gram packet Take 1 packet by mouth 2 (two) times daily.      cyclobenzaprine (FLEXERIL) 10 MG tablet Take 1 tablet (10 mg total) by mouth 3 (three) times daily as needed for Muscle spasms. 30 tablet 1    diltiaZEM (CARDIZEM CD) 120 MG Cp24 Take 1 capsule (120 mg total) by mouth once daily. 90 capsule 0    diphenhydrAMINE (BENADRYL) 25 mg capsule Take 25 mg by mouth every 12 (twelve) hours as needed for Itching.      gabapentin (NEURONTIN) 100 MG capsule Take 100 mg by mouth nightly as needed. HCS      levothyroxine (SYNTHROID) 88 MCG tablet Take 1 tablet (88 mcg total) by mouth before breakfast. 90 tablet 0    promethazine (PHENERGAN) 25 MG tablet TAKE 1 TABLET(25 MG) BY MOUTH TWICE DAILY AS NEEDED FOR  NAUSEA (Patient taking differently: Take 25 mg by mouth 2 (two) times daily as needed for Nausea.) 60 tablet 0    sertraline (ZOLOFT) 50 MG tablet Take 50 mg by mouth every evening.      doxycycline (VIBRAMYCIN) 100 MG Cap Take 100 mg by mouth every 12 (twelve) hours. HCS      ferric citrate (AURYXIA) 210 mg iron Tab Take 2 tablets by mouth 3 (three) times daily with meals.      traMADoL (ULTRAM) 50 mg tablet Take 1 tablet (50 mg total) by mouth every 8 (eight) hours as needed for Pain. 40 tablet 0       Scheduled meds:   cefTRIAXone (ROCEPHIN) IVPB  1 g Intravenous Q24H    cholestyramine-aspartame  1 packet Oral BID    diltiaZEM  120 mg Oral Daily    enoxaparin  30 mg Subcutaneous Daily    fluconazole  100 mg Oral QHS    levothyroxine  88 mcg Oral Before breakfast    miconazole   Topical (Top) BID    mupirocin   Nasal BID    sertraline  50 mg Oral QHS    vancomycin (VANCOCIN) IVPB  1,250 mg Intravenous Once       Infusions:      PRN meds:  acetaminophen, cyclobenzaprine, diphenhydrAMINE, gabapentin, LIDOcaine-prilocaine, morphine, ondansetron, oxyCODONE-acetaminophen, sodium chloride 0.9%, sodium chloride 0.9%, vancomycin - pharmacy to dose    Review of Systems:  Review of Systems   Constitutional: Negative for chills, fever, malaise/fatigue and weight loss.   HENT: Negative for hearing loss and nosebleeds.    Eyes: Negative for blurred vision, double vision and photophobia.   Respiratory: Negative for cough, shortness of breath and wheezing.    Cardiovascular: Negative for chest pain, palpitations and leg swelling.   Gastrointestinal: Negative for abdominal pain, constipation, diarrhea, heartburn, nausea and vomiting.   Genitourinary: Negative for dysuria, frequency and urgency.   Musculoskeletal: Negative for falls, joint pain and myalgias.   Skin: Negative for itching and rash.   Neurological: Negative for dizziness, speech change, focal weakness, loss of consciousness and headaches.    Endo/Heme/Allergies: Does not bruise/bleed easily.   Psychiatric/Behavioral: Negative for depression and substance abuse. The patient is not nervous/anxious.      OBJECTIVE:     Vital Signs and IO (Last 24H):  Temp:  [97.7 °F (36.5 °C)-98 °F (36.7 °C)]   Pulse:  [57-71]   Resp:  [18]   BP: ()/(49-66)   SpO2:  [91 %-100 %]   I/O last 3 completed shifts:  In: 500 [Other:500]  Out: 1500 [Other:1500]    Wt Readings from Last 5 Encounters:   01/18/22 68.3 kg (150 lb 9.2 oz)   09/15/21 63 kg (139 lb)   05/24/21 63 kg (139 lb)   05/19/21 66.7 kg (147 lb 0.8 oz)   05/13/21 63.5 kg (139 lb 15.9 oz)     Physical Exam:  Physical Exam  Constitutional:       Appearance: She is well-developed. She is not diaphoretic.   HENT:      Head: Normocephalic and atraumatic.   Eyes:      General: No scleral icterus.     Pupils: Pupils are equal, round, and reactive to light.   Cardiovascular:      Rate and Rhythm: Normal rate and regular rhythm.   Pulmonary:      Effort: Pulmonary effort is normal. No respiratory distress.      Breath sounds: No stridor.   Abdominal:      General: There is no distension.      Palpations: Abdomen is soft.   Musculoskeletal:         General: No deformity. Normal range of motion.      Cervical back: Neck supple.   Skin:     General: Skin is warm and dry.      Findings: No erythema or rash.   Neurological:      Mental Status: She is alert and oriented to person, place, and time.      Cranial Nerves: No cranial nerve deficit.   Psychiatric:         Behavior: Behavior normal.       Body mass index is 23.24 kg/m².    Laboratory:  Recent Labs   Lab 01/16/22  1115 01/17/22  0438 01/18/22  0412    136 135*   K 4.0 4.7 4.4    104 104   CO2 23 21* 20*   BUN 29* 32* 36*   CREATININE 5.7* 6.1* 6.7*   ESTGFRAFRICA 8.0* 7.4* 6.6*   EGFRNONAA 6.9* 6.4* 5.7*   GLU 95 67* 79       Recent Labs   Lab 01/16/22  1115 01/17/22  0438 01/18/22  0412   CALCIUM 8.3* 8.0* 8.2*   ALBUMIN 3.1*  --   --    PHOS  --   6.4* 6.6*   MG 1.6 1.5* 1.9       Recent Labs   Lab 09/30/19  0820 11/15/19  0748   PTH, Intact 176.4 H 238.4 H       No results for input(s): POCTGLUCOSE in the last 168 hours.    Recent Labs   Lab 11/15/19  0748 01/22/20  0848 05/25/21  0847   Hemoglobin A1C Invalid A SEE COMMENT 4.6       Recent Labs   Lab 01/16/22  1115 01/16/22  1115 01/17/22  0438 01/18/22  0412 01/19/22  0325   WBC 7.12   < > 7.64 5.53 4.28   HGB 9.4*   < > 9.6* 9.7* 9.5*   HCT 29.7*   < > 30.9* 30.7* 30.6*   *   < > 145* 128* 136*   *   < > 120* 118* 117*   MCHC 31.6*   < > 31.1* 31.6* 31.0*   MONO 5.1  0.4  --   --   --   --     < > = values in this interval not displayed.       Recent Labs   Lab 01/16/22  1115   BILITOT 0.8   PROT 6.0   ALBUMIN 3.1*   ALKPHOS 98   ALT 10   AST 12       Recent Labs   Lab 12/09/20  2323 12/09/20  2323 03/15/21  1533 05/17/21  1819 01/18/22  0331   Color, UA Yellow   < > Yellow Yellow Yellow   Appearance, UA Cloudy A   < > Cloudy A Hazy A Clear   pH, UA 6.0   < > 7.0 8.0 7.0   Specific [place], UA 1.020   < > 1.015 1.015 1.010   Protein, UA 1+ A   < > 2+ A 2+ A Trace A   Glucose, UA Negative   < > Negative Negative Negative   Ketones, UA Negative   < > Negative Negative Negative   Urobilinogen, UA Negative   < > Negative Negative Negative   Bilirubin (UA) Negative   < > Negative Negative Negative   Occult Blood UA 3+ A   < > 2+ A 2+ A 2+ A   Nitrite, UA Negative   < > Negative Negative Negative   RBC, UA 1   < > 21 H 7 H 10 H   WBC, UA >100 H   < > >100 H 80 H 50 H   Bacteria Negative  --  Negative Few A  --    Hyaline Casts, UA 10 A  --  40 A 0  --     < > = values in this interval not displayed.       Recent Labs   Lab 03/16/20  1551 12/08/20 2024   POC PH 7.402 7.370   POC PCO2 39.9 39.0   POC HCO3 24.9 22.5 L   POC PO2 58 LL 85   POC SATURATED O2 90 L 96   POC BE 0 -3   Sample ARTERIAL ARTERIAL       Microbiology Results (last 7 days)     Procedure Component Value Units Date/Time    Urine  culture [[id number]] Collected: 01/18/22 0331    Order Status: Completed Specimen: Urine Updated: 01/20/22 0928     Urine Culture, Routine No growth    Narrative:      Specimen Source->Urine    Blood culture #2 **CANNOT BE ORDERED STAT** [[id number]] Collected: 01/16/22 1130    Order Status: Completed Specimen: Blood from Peripheral, Antecubital, Right Updated: 01/19/22 1432     Blood Culture, Routine No Growth to date      No Growth to date      No Growth to date      No Growth to date    Blood culture #1 **CANNOT BE ORDERED STAT** [[id number]] Collected: 01/16/22 1115    Order Status: Completed Specimen: Blood from Peripheral, Antecubital, Right Updated: 01/19/22 1232     Blood Culture, Routine No Growth to date      No Growth to date      No Growth to date      No Growth to date        ASSESSMENT/PLAN:     ESRD on HD MWF via AVF  Continue current dialysis prescription.  Next HD per schedule.  Renal diet - low K, low phos.  No IVs or BP checks on access and/or non-dominant arm.     Anemia of CKD  Hgb and HCT are acceptable. Monitor.  Will provide ADAM and/or IV iron PRN.    MBD / Secondary HPT  Ca, phos, PTH and vitamin D levels are acceptable.   Phos binders, vitamin D analogues and calcimimetics as needed.    HTN  BP seem controlled.   Tolerate asymptomatic HTN up to -160.  Continue home meds.  Low sodium diet.    Thank you for allowing us to participate in the care of your patient!   We will follow the patient and provide recommendations as needed.    Patient care time was spent personally by me on the following activities:   · Obtaining a history.  · Examination of patient.  · Providing medical care at the patients bedside.  · Developing a treatment plan with patient or surrogate and bedside caregivers.  · Ordering and reviewing laboratory studies, radiographic studies, pulse oximetry.  · Ordering and performing treatments and interventions.  · Evaluation of patient's response to treatment.  · Discussions  with consultants while on the unit and immediately available to the patient.  · Re-evaluation of the patient's condition.  · Documentation in the medical record.     Raúl Dietz MD    [facility]  [address] [place] [address]  [place], [place] [address] [phone] - tel  [phone] - fax    1/20/2022

## 2022-01-21 NOTE — CONSULTS
Nephrology Consult Note        Patient Name: Shara Hutchins  MRN: [id number]    Patient Class: IP- Inpatient   Admission Date: 1/16/2022  Length of Stay: 5 days  Date of Service: 1/21/2022    Attending Physician: Ashwin Olson MD  Primary Care Provider: April Horton MD    Reason for Consult: esrd/anemia/htn/shpt/vulvar cellulitis    SUBJECTIVE:     HPI: ESRD patient on HD MWF presents to the emergency department with recurrent vulvar cellulitis, complicated by chronic diarrhea since being treated with radiation. Unrelieved by any home analgesics or topical medications. Associated with intermittent low grade fever, occasional bleeding from excoriations of the skin, and hardened swelling of the affected area. She has intermittent nausea associated with the severe pain.     Imaging obtained in the emergency department incidentally revealed an obstructive, right-sided nephrolithiasis by 6 mm kidney stone causing perinephric fat stranding/hydronephrosis. The patient denied flank pain, hematuria, increased urination or urgency.    1/17  AVF non-functioning.  Consult Vascular surgery for trialysis catheter and avf evaluation  1/18  Good avf flow on ultrasound.  At a procedure  1/19  Seen on dialysis.  No cp or sob.  AVF sore  1/20  Sleeping.  No distress.  1/21  AFVSS.     No acute events   Seen on dialysis.  Some pain in vaginal area    Past Medical History:   Diagnosis Date    Cellulitis of trunk     Perineum, numerous episodes    Chronic diarrhea 02/10/2020    Short-gut syndrome    Diabetes mellitus, type 2     pt has not had for years after losing weight    End stage renal disease on dialysis 01/07/2018    Fatigue     Hypertension     Hypothyroidism 2/10/2020    Iron deficiency anemia due to chronic blood loss 1/7/2018    Pulmonary hypertension 3/20/2020    Vulvar cancer 1/7/2019     Past Surgical History:   Procedure Laterality Date    ABDOMINAL SURGERY      APPENDECTOMY      AV FISTULA PLACEMENT Left  2018    BACK SURGERY      BLADDER FULGURATION N/A 6/16/2020    Procedure: FULGURATION, BLADDER;  Surgeon: Damari Barber Jr., MD;  Location: [facility];  Service: Urology;  Laterality: N/A;    carpel tunnel surgery once on each hand      CERVICAL FUSION      x 4    cervix repair      CHOLECYSTECTOMY  2000    COLECTOMY      CYSTOSCOPY W/ RETROGRADES  6/16/2020    Procedure: CYSTOSCOPY, WITH RETROGRADE PYELOGRAM;  Surgeon: Damari Barber Jr., MD;  Location: [facility] OR;  Service: Urology;;    CYSTOSCOPY W/ RETROGRADES Right 4/20/2021    Procedure: CYSTOSCOPY, WITH RETROGRADE PYELOGRAM;  Surgeon: Damari Barber Jr., MD;  Location: [facility] OR;  Service: Urology;  Laterality: Right;    CYSTOSCOPY W/ URETERAL STENT PLACEMENT Right 3/16/2021    Procedure: CYSTOSCOPY, WITH URETERAL STENT INSERTION;  Surgeon: Damari Barber Jr., MD;  Location: [facility] OR;  Service: Urology;  Laterality: Right;    CYSTOSCOPY W/ URETERAL STENT PLACEMENT Right 5/18/2021    Procedure: CYSTOSCOPY, WITH URETERAL STENT INSERTION;  Surgeon: Damari Barber Jr., MD;  Location: [facility] OR;  Service: Urology;  Laterality: Right;    CYSTOSCOPY W/ URETERAL STENT REMOVAL Right 4/20/2021    Procedure: CYSTOSCOPY, WITH URETERAL STENT REMOVAL;  Surgeon: Damari Barber Jr., MD;  Location: [facility] OR;  Service: Urology;  Laterality: Right;    CYSTOSCOPY WITH URETEROSCOPY, RETROGRADE PYELOGRAPHY, AND INSERTION OF STENT Right 3/30/2021    Procedure: CYSTOSCOPY, WITH RETROGRADE PYELOGRAM AND URETERAL STENT INSERTION;  Surgeon: Damari Barber Jr., MD;  Location: [facility] OR;  Service: Urology;  Laterality: Right;    CYSTOURETEROSCOPY WITH RETROGRADE PYELOGRAPHY AND INSERTION OF STENT INTO URETER Right 1/18/2022    Procedure: CYSTOURETEROSCOPY, WITH RETROGRADE PYELOGRAM AND URETERAL STENT INSERTION;  Surgeon: Damari Barber Jr., MD;  Location: [facility] OR;  Service: Urology;  Laterality: Right;    DIAGNOSTIC LAPAROSCOPY N/A 2/13/2020    Procedure: LAPAROSCOPY, DIAGNOSTIC;   Surgeon: Robbi Lovell III, MD;  Location: [facility] OR;  Service: General;  Laterality: N/A;    HYSTERECTOMY  1980    ILEOSTOMY N/A 2/13/2020    Procedure: CREATION, ILEOSTOMY Loop;  Surgeon: Robbi Lovell III, MD;  Location: [facility] OR;  Service: General;  Laterality: N/A;    ILEOSTOMY CLOSURE N/A 5/18/2020    Procedure: CLOSURE, ILEOSTOMY;  Surgeon: Robbi Lovell III, MD;  Location: [facility] OR;  Service: General;  Laterality: N/A;    LASER LITHOTRIPSY Right 3/30/2021    Procedure: LITHOTRIPSY, USING LASER;  Surgeon: Damari Barber Jr., MD;  Location: [facility] OR;  Service: Urology;  Laterality: Right;    LUMBAR LAMINECTOMY      LYSIS OF ADHESIONS N/A 2/13/2020    Procedure: LYSIS, ADHESIONS;  Surgeon: Robbi Lovell III, MD;  Location: [facility] OR;  Service: General;  Laterality: N/A;    NECK SURGERY      PHLEBOGRAPHY  2/28/2020    Procedure: VENOGRAM;  Surgeon: Robbi Lovell III, MD;  Location: [facility] OR;  Service: General;;    REMOVAL OF VASCULAR ACCESS PORT Right 2/13/2020    Procedure: REMOVAL, VASCULAR ACCESS PORT;  Surgeon: Robbi Lovell III, MD;  Location: [facility] OR;  Service: General;  Laterality: Right;    RETROGRADE PYELOGRAPHY  3/16/2021    Procedure: PYELOGRAM, RETROGRADE;  Surgeon: Damari Barber Jr., MD;  Location: [facility] OR;  Service: Urology;;    RETROGRADE PYELOGRAPHY Right 5/18/2021    Procedure: PYELOGRAM, RETROGRADE;  Surgeon: Damari Barber Jr., MD;  Location: [facility] OR;  Service: Urology;  Laterality: Right;    SHOULDER SURGERY      URETEROSCOPIC REMOVAL OF URETERIC CALCULUS Right 3/30/2021    Procedure: REMOVAL, CALCULUS, URETER, URETEROSCOPIC;  Surgeon: Damari Barber Jr., MD;  Location: [facility] OR;  Service: Urology;  Laterality: Right;    URETEROSCOPIC REMOVAL OF URETERIC CALCULUS Right 4/20/2021    Procedure: REMOVAL, CALCULUS, URETER, URETEROSCOPIC;  Surgeon: Damari Barber Jr., MD;  Location: [facility] OR;  Service: Urology;  Laterality: Right;    vulva cancer       Family  History   Problem Relation Age of Onset    Heart disease Mother     Hypertension Mother     Cancer Mother         lung    Heart disease Father     Heart disease Sister         Open heart surgery    No Known Problems Daughter      Social History     Tobacco Use    Smoking status: Former Smoker     Packs/day: 1.00     Years: 33.00     Pack years: 33.00     Types: Cigarettes     Quit date: 2000     Years since quittin.0    Smokeless tobacco: Former User     Quit date:    Substance Use Topics    Alcohol use: No    Drug use: No       Review of patient's allergies indicates:   Allergen Reactions    Ciprofloxacin Other (See Comments)     Elevated liver enzymes      Pcn [penicillins] Anaphylaxis     TOLERATES ROCEPHIN WITHOUT DIFFICULTY       Outpatient meds:  Current Facility-Administered Medications on File Prior to Encounter   Medication Dose Route Frequency Provider Last Rate Last Admin    electrolyte-S (ISOLYTE)   Intravenous Continuous Raz Ruff MD        lactated ringers infusion   Intravenous Continuous Ruy Beltre MD        LIDOcaine (PF) 10 mg/ml (1%) injection 5 mg  0.5 mL Intradermal Once Raz Ruff MD         Current Outpatient Medications on File Prior to Encounter   Medication Sig Dispense Refill    CHOLESTYRAMINE LIGHT 4 gram packet Take 1 packet by mouth 2 (two) times daily.      cyclobenzaprine (FLEXERIL) 10 MG tablet Take 1 tablet (10 mg total) by mouth 3 (three) times daily as needed for Muscle spasms. 30 tablet 1    diltiaZEM (CARDIZEM CD) 120 MG Cp24 Take 1 capsule (120 mg total) by mouth once daily. 90 capsule 0    diphenhydrAMINE (BENADRYL) 25 mg capsule Take 25 mg by mouth every 12 (twelve) hours as needed for Itching.      gabapentin (NEURONTIN) 100 MG capsule Take 100 mg by mouth nightly as needed. HCS      levothyroxine (SYNTHROID) 88 MCG tablet Take 1 tablet (88 mcg total) by mouth before breakfast. 90 tablet 0    promethazine (PHENERGAN)  25 MG tablet TAKE 1 TABLET(25 MG) BY MOUTH TWICE DAILY AS NEEDED FOR NAUSEA (Patient taking differently: Take 25 mg by mouth 2 (two) times daily as needed for Nausea.) 60 tablet 0    sertraline (ZOLOFT) 50 MG tablet Take 50 mg by mouth every evening.      doxycycline (VIBRAMYCIN) 100 MG Cap Take 100 mg by mouth every 12 (twelve) hours. HCS      ferric citrate (AURYXIA) 210 mg iron Tab Take 2 tablets by mouth 3 (three) times daily with meals.      traMADoL (ULTRAM) 50 mg tablet Take 1 tablet (50 mg total) by mouth every 8 (eight) hours as needed for Pain. 40 tablet 0       Scheduled meds:   cefTRIAXone (ROCEPHIN) IVPB  1 g Intravenous Q24H    cholestyramine-aspartame  1 packet Oral BID    diltiaZEM  120 mg Oral Daily    enoxaparin  30 mg Subcutaneous Daily    fluconazole  100 mg Oral QHS    levothyroxine  88 mcg Oral Before breakfast    miconazole   Topical (Top) BID    Questran and Aquaphor Topical Compound   Topical (Top) BID    sertraline  50 mg Oral QHS       Infusions:      PRN meds:  acetaminophen, cyclobenzaprine, diphenhydrAMINE, gabapentin, LIDOcaine-prilocaine, morphine, ondansetron, oxyCODONE-acetaminophen, sodium chloride 0.9%, sodium chloride 0.9%, vancomycin - pharmacy to dose    Review of Systems:  Review of Systems   Constitutional: Negative for chills, fever, malaise/fatigue and weight loss.   HENT: Negative for hearing loss and nosebleeds.    Eyes: Negative for blurred vision, double vision and photophobia.   Respiratory: Negative for cough, shortness of breath and wheezing.    Cardiovascular: Negative for chest pain, palpitations and leg swelling.   Gastrointestinal: Negative for abdominal pain, constipation, diarrhea, heartburn, nausea and vomiting.   Genitourinary: Negative for dysuria, frequency and urgency.   Musculoskeletal: Negative for falls, joint pain and myalgias.   Skin: Negative for itching and rash.   Neurological: Negative for dizziness, speech change, focal weakness,  loss of consciousness and headaches.   Endo/Heme/Allergies: Does not bruise/bleed easily.   Psychiatric/Behavioral: Negative for depression and substance abuse. The patient is not nervous/anxious.      OBJECTIVE:     Vital Signs and IO (Last 24H):  Temp:  [97.6 °F (36.4 °C)-98.2 °F (36.8 °C)]   Pulse:  [67-79]   Resp:  [16-20]   BP: (112-142)/(59-74)   SpO2:  [94 %-96 %]   No intake/output data recorded.    Wt Readings from Last 5 Encounters:   01/18/22 68.3 kg (150 lb 9.2 oz)   09/15/21 63 kg (139 lb)   05/24/21 63 kg (139 lb)   05/19/21 66.7 kg (147 lb 0.8 oz)   05/13/21 63.5 kg (139 lb 15.9 oz)     Physical Exam:  Physical Exam  Constitutional:       Appearance: She is well-developed. She is not diaphoretic.   HENT:      Head: Normocephalic and atraumatic.   Eyes:      General: No scleral icterus.     Pupils: Pupils are equal, round, and reactive to light.   Cardiovascular:      Rate and Rhythm: Normal rate and regular rhythm.   Pulmonary:      Effort: Pulmonary effort is normal. No respiratory distress.      Breath sounds: No stridor.   Abdominal:      General: There is no distension.      Palpations: Abdomen is soft.   Musculoskeletal:         General: No deformity. Normal range of motion.      Cervical back: Neck supple.   Skin:     General: Skin is warm and dry.      Findings: No erythema or rash.   Neurological:      Mental Status: She is alert and oriented to person, place, and time.      Cranial Nerves: No cranial nerve deficit.   Psychiatric:         Behavior: Behavior normal.       Body mass index is 23.24 kg/m².    Laboratory:  Recent Labs   Lab 01/17/22  0438 01/18/22  0412 01/21/22  0424    135* 138   K 4.7 4.4 4.6    104 106   CO2 21* 20* 23   BUN 32* 36* 35*   CREATININE 6.1* 6.7* 5.8*   ESTGFRAFRICA 7.4* 6.6* 7.8*   EGFRNONAA 6.4* 5.7* 6.8*   GLU 67* 79 76       Recent Labs   Lab 01/16/22  1115 01/16/22  1115 01/17/22  0438 01/18/22  0412 01/21/22  0424   CALCIUM 8.3*   < > 8.0* 8.2*  7.6*   ALBUMIN 3.1*  --   --   --   --    PHOS  --   --  6.4* 6.6*  --    MG 1.6   < > 1.5* 1.9 1.8    < > = values in this interval not displayed.       Recent Labs   Lab 09/30/19  0820 11/15/19  0748   PTH, Intact 176.4 H 238.4 H       No results for input(s): POCTGLUCOSE in the last 168 hours.    Recent Labs   Lab 11/15/19  0748 01/22/20  0848 05/25/21  0847   Hemoglobin A1C Invalid A SEE COMMENT 4.6       Recent Labs   Lab 01/16/22  1115 01/17/22  0438 01/18/22  0412 01/19/22  0325 01/21/22  0424   WBC 7.12   < > 5.53 4.28 5.83   HGB 9.4*   < > 9.7* 9.5* 8.9*   HCT 29.7*   < > 30.7* 30.6* 29.4*   *   < > 128* 136* 144*   *   < > 118* 117* 120*   MCHC 31.6*   < > 31.6* 31.0* 30.3*   MONO 5.1  0.4  --   --   --   --     < > = values in this interval not displayed.       Recent Labs   Lab 01/16/22  1115   BILITOT 0.8   PROT 6.0   ALBUMIN 3.1*   ALKPHOS 98   ALT 10   AST 12       Recent Labs   Lab 12/09/20  2323 12/09/20  2323 03/15/21  1533 05/17/21  1819 01/18/22  0331   Color, UA Yellow   < > Yellow Yellow Yellow   Appearance, UA Cloudy A   < > Cloudy A Hazy A Clear   pH, UA 6.0   < > 7.0 8.0 7.0   Specific [place], UA 1.020   < > 1.015 1.015 1.010   Protein, UA 1+ A   < > 2+ A 2+ A Trace A   Glucose, UA Negative   < > Negative Negative Negative   Ketones, UA Negative   < > Negative Negative Negative   Urobilinogen, UA Negative   < > Negative Negative Negative   Bilirubin (UA) Negative   < > Negative Negative Negative   Occult Blood UA 3+ A   < > 2+ A 2+ A 2+ A   Nitrite, UA Negative   < > Negative Negative Negative   RBC, UA 1   < > 21 H 7 H 10 H   WBC, UA >100 H   < > >100 H 80 H 50 H   Bacteria Negative  --  Negative Few A  --    Hyaline Casts, UA 10 A  --  40 A 0  --     < > = values in this interval not displayed.       Recent Labs   Lab 03/16/20  1551 12/08/20 2024   POC PH 7.402 7.370   POC PCO2 39.9 39.0   POC HCO3 24.9 22.5 L   POC PO2 58 LL 85   POC SATURATED O2 90 L 96   POC BE 0 -3    Sample ARTERIAL ARTERIAL       Microbiology Results (last 7 days)     Procedure Component Value Units Date/Time    Blood culture #2 **CANNOT BE ORDERED STAT** [[id number]] Collected: 01/16/22 1130    Order Status: Completed Specimen: Blood from Peripheral, Antecubital, Right Updated: 01/21/22 1432     Blood Culture, Routine No growth after 5 days.    Blood culture #1 **CANNOT BE ORDERED STAT** [[id number]] Collected: 01/16/22 1115    Order Status: Completed Specimen: Blood from Peripheral, Antecubital, Right Updated: 01/21/22 1232     Blood Culture, Routine No growth after 5 days.    Urine culture [[id number]] Collected: 01/18/22 0331    Order Status: Completed Specimen: Urine Updated: 01/20/22 0928     Urine Culture, Routine No growth    Narrative:      Specimen Source->Urine        ASSESSMENT/PLAN:     ESRD on HD MWF via AVF  Continue current dialysis prescription.  Next HD per schedule.  Renal diet - low K, low phos.  No IVs or BP checks on access and/or non-dominant arm.     Anemia of CKD  Hgb and HCT are acceptable. Monitor.  Will provide ADAM and/or IV iron PRN.    MBD / Secondary HPT  Ca, phos, PTH and vitamin D levels are acceptable.   Phos binders, vitamin D analogues and calcimimetics as needed.    HTN  BP seem controlled.   Tolerate asymptomatic HTN up to -160.  Continue home meds.  Low sodium diet.    Thank you for allowing us to participate in the care of your patient!   We will follow the patient and provide recommendations as needed.    Patient care time was spent personally by me on the following activities:   · Obtaining a history.  · Examination of patient.  · Providing medical care at the patients bedside.  · Developing a treatment plan with patient or surrogate and bedside caregivers.  · Ordering and reviewing laboratory studies, radiographic studies, pulse oximetry.  · Ordering and performing treatments and interventions.  · Evaluation of patient's response to treatment.  · Discussions  with consultants while on the unit and immediately available to the patient.  · Re-evaluation of the patient's condition.  · Documentation in the medical record.     Raúl Dietz MD    [facility]  [address] [place] [address]  [place], [place] [address] [phone] - tel  [phone] - fax    1/21/2022

## 2022-01-21 NOTE — PLAN OF CARE
Important Message from Medicare was sign, explained and given to patient/caregiver on 01/21/2022 at 4:05pm     addressed any questions or concerns.    Important Message from Medicare document will be scanned into patient's medical record

## 2022-01-21 NOTE — PROGRESS NOTES
[facility]  Adult Nutrition   Progress Note (Follow-Up)    SUMMARY     Recommendations  Recommendation/Intervention:   1. RD recommends continue current nutritional POC, re: cardiac diet.   2. RD added Nepro (Berry flavor only) and banatrol TID (diarrhea) per patient desires.   3. Menu  to continmue to get dietary preferences daily.  Goals: Ensure 100% of patients nutritional needs are met with >75% of a PO diet and ONS's.  Nutrition Goal Status: progressing towards goal  Communication of RD Recs: other (comment) (patient)    Dietitian Rounds Brief  F/U Nutrition Note: Spoke with pt today and she says that she is consuming 50 to 75 % of her (now) cardiac diet (was renal) (she said MD said she could have anything she wanted and that she new better than anyone else what she needed and I couldn't agree more). After speaking with her, we decided to add some berry flavor Nepro TID (for her increased nutritional needs)and some banatrol TID (for her diarrhea). She says that she also has intermittent nausea and is given phenergan for it and that works for her. Her LBM was today. Will continue to follow prn till discharge,    Reason for Assessment  Reason For Assessment: RD follow-up  Diagnosis: renal disease  Relevant Medical History: IBD, hx of vulvuar cancer, ESRD-on HD, HTN, vitamin D deficiency, B12 deficiency, severe malnutrition  Interdisciplinary Rounds: attended    Nutrition Risk Screen  Nutrition Risk Screen: no indicators present       Wound 01/20/22 1000 Incontinence associated dermatitis anterior;posterior Vagina-Wound Image: Images linked  MST Score: 0  Have you recently lost weight without trying?: No  Weight loss score: 0  Have you been eating poorly because of a decreased appetite?: No  Appetite score: 0       Nutrition/Diet History  Food Allergies: NKFA  Factors Affecting Nutritional Intake: None identified at this time    Anthropometrics  Temp: 97.7 °F (36.5 °C)  Height Method:  "Stated  Height: 5' 7.5" (171.5 cm)  Height (inches): 67.5 in  Weight Method: Standard Scale  Weight: 68.3 kg (150 lb 9.2 oz)  Weight (lb): 150.58 lb  Ideal Body Weight (IBW), Female: 137.5 lb  % Ideal Body Weight, Female (lb): 101.97 %  BMI (Calculated): 23.2  BMI Grade: 18.5-24.9 - normal       Weight History:  Wt Readings from Last 10 Encounters:   01/18/22 68.3 kg (150 lb 9.2 oz)   09/15/21 63 kg (139 lb)   05/24/21 63 kg (139 lb)   05/19/21 66.7 kg (147 lb 0.8 oz)   05/13/21 63.5 kg (139 lb 15.9 oz)   04/20/21 63.5 kg (140 lb)   03/30/21 59.9 kg (132 lb)   03/15/21 62 kg (136 lb 11 oz)   02/04/21 62.6 kg (138 lb)   02/01/21 61.2 kg (135 lb)       Lab/Procedures/Meds: Pertinent Labs Reviewed    Clinical Chemistry:  Recent Labs   Lab 01/16/22  1115 01/16/22  1115 01/17/22  0438 01/17/22  0438 01/18/22  0412 01/18/22  0412 01/21/22  0424      < > 136   < > 135*   < > 138   K 4.0   < > 4.7   < > 4.4   < > 4.6      < > 104   < > 104   < > 106   CO2 23   < > 21*   < > 20*   < > 23   GLU 95   < > 67*   < > 79   < > 76   BUN 29*   < > 32*   < > 36*   < > 35*   CREATININE 5.7*   < > 6.1*   < > 6.7*   < > 5.8*   CALCIUM 8.3*   < > 8.0*   < > 8.2*   < > 7.6*   PROT 6.0  --   --   --   --   --   --    ALBUMIN 3.1*  --   --   --   --   --   --    BILITOT 0.8  --   --   --   --   --   --    ALKPHOS 98  --   --   --   --   --   --    AST 12  --   --   --   --   --   --    ALT 10  --   --   --   --   --   --    ANIONGAP 14   < > 11   < > 11   < > 9   ESTGFRAFRICA 8.0*   < > 7.4*   < > 6.6*   < > 7.8*   EGFRNONAA 6.9*   < > 6.4*   < > 5.7*   < > 6.8*   MG 1.6   < > 1.5*   < > 1.9   < > 1.8   PHOS  --   --  6.4*  --  6.6*  --   --     < > = values in this interval not displayed.       CBC:   Recent Labs   Lab 01/21/22  0424   WBC 5.83   RBC 2.45*   HGB 8.9*   HCT 29.4*   *   *   MCH 36.3*   MCHC 30.3*       Medications: Pertinent Medications reviewed    Scheduled Meds:   cefTRIAXone (ROCEPHIN) IVPB  1 g " Intravenous Q24H    cholestyramine-aspartame  1 packet Oral BID    diltiaZEM  120 mg Oral Daily    enoxaparin  30 mg Subcutaneous Daily    fluconazole  100 mg Oral QHS    levothyroxine  88 mcg Oral Before breakfast    miconazole   Topical (Top) BID    Questran and Aquaphor Topical Compound   Topical (Top) BID    sertraline  50 mg Oral QHS       Continuous Infusions:    PRN Meds:.acetaminophen, cyclobenzaprine, diphenhydrAMINE, gabapentin, LIDOcaine-prilocaine, morphine, ondansetron, oxyCODONE-acetaminophen, sodium chloride 0.9%, sodium chloride 0.9%, vancomycin - pharmacy to dose    Estimated/Assessed Needs  Weight Used For Calorie Calculations: 63.6 kg (140 lb 3.4 oz)  Energy Calorie Requirements (kcal): 4586-2250 kcals/day (20-25 kcals/kg)  Energy Need Method: Kcal/kg  Protein Requirements: 76-97 g/day (1.2-1.5 g/kg)  Weight Used For Protein Calculations: 63.6 kg (140 lb 3.4 oz)  Fluid Requirements (mL): 1 mL/kcal or per MD     RDA Method (mL): 1272       Nutrition Prescription Ordered  Current Diet Order: Cardiac    Evaluation of Received Nutrient/Fluid Intake  Energy Calories Required: meeting needs  Protein Required: meeting needs  Fluid Required: meeting needs  Tolerance: tolerating   No intake or output data in the 24 hours ending 01/21/22 1606   % Intake of Estimated Energy Needs: 75 - 100 %  % Meal Intake: 50 - 75 %    Nutrition Risk  Level of Risk/Frequency of Follow-up: moderate - high     Monitor and Evaluation  Food and Nutrient Intake: energy intake,food and beverage intake  Food and Nutrient Adminstration: diet order  Knowledge/Beliefs/Attitudes: beliefs and attitudes,food and nutrition knowledge/skill  Physical Activity and Function: nutrition-related ADLs and IADLs  Anthropometric Measurements: weight,body mass index  Biochemical Data, Medical Tests and Procedures: electrolyte and renal panel,gastrointestinal profile,glucose/endocrine profile,inflammatory profile,lipid  profile  Nutrition-Focused Physical Findings: overall appearance     Nutrition Follow-Up  RD Follow-up?: Yes   Chantel Correia RD 01/21/2022   4:12 PM

## 2022-01-21 NOTE — PROGRESS NOTES
Critical access hospital Medicine  Progress Note    Patient Name: Shara Hutchins  MRN: [id number]  Patient Class: IP- Inpatient   Admission Date: 1/16/2022  Length of Stay: 5 days  Attending Physician: Ashwin Olson MD  Primary Care Provider: April Horton MD        Subjective:     Principal Problem:ESRD on dialysis        HPI:  Patient presents to the emergency department with vulvar cellulitis that is recurrent.  The patient states that about 1 week ago she developed itching, redness, increased pain to the vulva.  She has a pertinent past medical history of fall vol cancer status post radiation and has since had recurrent episodes of cellulitis in the area.  She reports applying different types of creams such as Neosporin, Reginald Butt paste, calseptin, Aquaphor, and others with no improvement and about 3 days ago the symptoms worsened.  Her history is also complicated by chronic diarrhea since being treated with radiation in which she has loose incontinent stools multiple times per day.     The patient's current problem of cellulitis begins at superior-most aspect of her vulva and extends across the entire perineum to between the gluteal folds. The cellulitis also extends into the groin bilaterally.  The patient's pain has progressed to severe, nonradiating, described as burning, itching and dull.  Unrelieved by any home analgesics or topical medications.  Associated with intermittent low grade fever (not measured at home), occasional bleeding from excoriations of the skin, and hardened swelling of the affected area.  Or she also has intermittent nausea she believes is associated with the severe pain.  No vomiting.  The patient has been evaluated and treated by Dr. Blanca Neal in the past.  The patient was treated with meropenem and vancomycin in the emergency department.  PCN allergy noted and the patient reported tolerating meropenem and the past.     Imaging obtained in the emergency department  "incidentally revealed an obstructive, right-sided nephrolithiasis by 6 mm kidney stone that is causing perinephric fat stranding/hydronephrosis..  She has a history of nephrolithiasis and a CT.  The patient did receive IV contrast in the emergency department for this imaging and has hemodialysis 3 times a week overseen by Dr. Dietz.  The patient denied flank pain, hematuria, increased urination or urgency.  She does have a history of anuria.      Overview/Hospital Course:  01/18  Assumed care. Chart reviewed. Patient seen at [facility]: after cystoscopy/stent. Feels "Good". Labs reviewed and noted below: no leukocytosis with stable macrocytic anemia; trivial hyponatremia with end stage renal dysfunction    01/19  Had stent re-do today, followed by [facility]. Only complaint is vaginitis is persisting. Feels "Good" and would like to be discharged when cleared by Renal and ID. Labs reviewed and noted below: no leukocytosis with stable macrocytic anemia. Continue current regimen.     01/20  Discussed with Wound Care: to make poultice with Aquaphor and Questran for vaginal topical application. Vaginal area continues to be source of great discomfort. For [facility] in AM. Lab holiday today. Ordered for AM before RRT    01/21  Discussed with Vascular and Renal: may need to allow fistula to rest--[facility] RN had difficult time cannulating fistula--but it is functioning properly. Vaginitis has improved with current therapy. Discussed with : to order Home Health at discharge for poultice application and vaginitis follow-up after discharge. Will monitor over weekend and see how difficult fistula is at [facility] on 01/224. Plan: continue current regimen; lab holiday in AM.      Interval History: improving vaginitis    Review of Systems   Constitutional: Positive for fatigue.   HENT: Negative.    Eyes: Negative.    Respiratory: Negative.    Cardiovascular: Negative.    Gastrointestinal: Negative.    Endocrine: Negative.    Genitourinary: Positive " for vaginal pain.   Musculoskeletal: Negative.    Skin: Negative.    Allergic/Immunologic: Negative.    Neurological: Negative.    Hematological: Negative.    All other systems reviewed and are negative.    Objective:     Vital Signs (Most Recent):  Temp: 97.9 °F (36.6 °C) (01/21/22 1410)  Pulse: 64 (01/21/22 1600)  Resp: 18 (01/21/22 1410)  BP: (!) 128/59 (01/21/22 1600)  SpO2: 95 % (01/21/22 1410) Vital Signs (24h Range):  Temp:  [97.6 °F (36.4 °C)-98.2 °F (36.8 °C)] 97.9 °F (36.6 °C)  Pulse:  [62-79] 64  Resp:  [16-20] 18  SpO2:  [94 %-96 %] 95 %  BP: (111-142)/(55-74) 128/59     Weight: 68.3 kg (150 lb 9.2 oz)  Body mass index is 23.24 kg/m².  No intake or output data in the 24 hours ending 01/21/22 1625   Physical Exam  Vitals and nursing note reviewed.   Constitutional:       Appearance: She is well-developed and well-nourished.   HENT:      Head: Normocephalic and atraumatic.      Right Ear: External ear normal.      Left Ear: External ear normal.      Nose: Nose normal.      Mouth/Throat:      Mouth: Oropharynx is clear and moist.   Eyes:      Extraocular Movements: EOM normal.      Conjunctiva/sclera: Conjunctivae normal.      Pupils: Pupils are equal, round, and reactive to light.   Cardiovascular:      Rate and Rhythm: Normal rate and regular rhythm.      Pulses: Intact distal pulses.      Heart sounds: Normal heart sounds.      Comments: Good thrill  Pulmonary:      Effort: Pulmonary effort is normal.      Breath sounds: Normal breath sounds.   Abdominal:      General: Bowel sounds are normal.      Palpations: Abdomen is soft.   Musculoskeletal:         General: Normal range of motion.      Cervical back: Normal range of motion and neck supple.   Skin:     General: Skin is warm and dry.      Capillary Refill: Capillary refill takes less than 2 seconds.   Neurological:      Mental Status: She is alert and oriented to person, place, and time.   Psychiatric:         Mood and Affect: Mood and affect normal.          Behavior: Behavior normal.         Thought Content: Thought content normal.         Judgment: Judgment normal.         Significant Labs:   All pertinent labs within the past 24 hours have been reviewed.  BMP:   Recent Labs   Lab 01/21/22 0424   GLU 76      K 4.6      CO2 23   BUN 35*   CREATININE 5.8*   CALCIUM 7.6*   MG 1.8     CBC:   Recent Labs   Lab 01/21/22 0424   WBC 5.83   HGB 8.9*   HCT 29.4*   *       Significant Imaging: I have reviewed all pertinent imaging results/findings within the past 24 hours.      Assessment/Plan:      No notes have been filed under this hospital service.  Service: Hospital Medicine    VTE Risk Mitigation (From admission, onward)         Ordered     enoxaparin injection 30 mg  Daily         01/16/22 1502     IP VTE HIGH RISK PATIENT  Once         01/16/22 1502     Place sequential compression device  Until discontinued         01/16/22 1502                Discharge Planning   RODRIGO: 1/23/2022     Code Status: Full Code   Is the patient medically ready for discharge?:     Reason for patient still in hospital (select all that apply): Patient trending condition and Treatment  Discharge Plan A: Assisted Living   Discharge Delays: None known at this time              Ashwin Olson MD  Department of Hospital Medicine   [facility]

## 2022-01-21 NOTE — PLAN OF CARE
Problem: Malnutrition  Goal: Improved Nutritional Intake  Outcome: Ongoing, Progressing  Intervention: Promote and Optimize Oral Intake  Flowsheets (Taken 1/21/2022 1214)  Oral Nutrition Promotion: calorie-dense liquids provided

## 2022-01-21 NOTE — PLAN OF CARE
01/21/22 1141   Discharge Reassessment   Assessment Type Discharge Planning Reassessment   Did the patient's condition or plan change since previous assessment? No   Discharge Plan discussed with: Patient   Communicated RODRIGO with patient/caregiver Yes   Discharge Plan A Assisted Living   Discharge Plan B Assisted Living   DME Needed Upon Discharge  none   Discharge Barriers Identified None   Post-Acute Status   Discharge Delays None known at this time   Pt lives at [facility] and has dialysis [facility] on MWF @ 0800.  Plan is to return to [facility].  CM will continue to follow.

## 2022-01-21 NOTE — SUBJECTIVE & OBJECTIVE
Interval History: improving vaginitis    Review of Systems   Constitutional: Positive for fatigue.   HENT: Negative.    Eyes: Negative.    Respiratory: Negative.    Cardiovascular: Negative.    Gastrointestinal: Negative.    Endocrine: Negative.    Genitourinary: Positive for vaginal pain.   Musculoskeletal: Negative.    Skin: Negative.    Allergic/Immunologic: Negative.    Neurological: Negative.    Hematological: Negative.    All other systems reviewed and are negative.    Objective:     Vital Signs (Most Recent):  Temp: 97.9 °F (36.6 °C) (01/21/22 1410)  Pulse: 64 (01/21/22 1600)  Resp: 18 (01/21/22 1410)  BP: (!) 128/59 (01/21/22 1600)  SpO2: 95 % (01/21/22 1410) Vital Signs (24h Range):  Temp:  [97.6 °F (36.4 °C)-98.2 °F (36.8 °C)] 97.9 °F (36.6 °C)  Pulse:  [62-79] 64  Resp:  [16-20] 18  SpO2:  [94 %-96 %] 95 %  BP: (111-142)/(55-74) 128/59     Weight: 68.3 kg (150 lb 9.2 oz)  Body mass index is 23.24 kg/m².  No intake or output data in the 24 hours ending 01/21/22 1625   Physical Exam  Vitals and nursing note reviewed.   Constitutional:       Appearance: She is well-developed and well-nourished.   HENT:      Head: Normocephalic and atraumatic.      Right Ear: External ear normal.      Left Ear: External ear normal.      Nose: Nose normal.      Mouth/Throat:      Mouth: Oropharynx is clear and moist.   Eyes:      Extraocular Movements: EOM normal.      Conjunctiva/sclera: Conjunctivae normal.      Pupils: Pupils are equal, round, and reactive to light.   Cardiovascular:      Rate and Rhythm: Normal rate and regular rhythm.      Pulses: Intact distal pulses.      Heart sounds: Normal heart sounds.      Comments: Good thrill  Pulmonary:      Effort: Pulmonary effort is normal.      Breath sounds: Normal breath sounds.   Abdominal:      General: Bowel sounds are normal.      Palpations: Abdomen is soft.   Musculoskeletal:         General: Normal range of motion.      Cervical back: Normal range of motion and neck  supple.   Skin:     General: Skin is warm and dry.      Capillary Refill: Capillary refill takes less than 2 seconds.   Neurological:      Mental Status: She is alert and oriented to person, place, and time.   Psychiatric:         Mood and Affect: Mood and affect normal.         Behavior: Behavior normal.         Thought Content: Thought content normal.         Judgment: Judgment normal.         Significant Labs:   All pertinent labs within the past 24 hours have been reviewed.  BMP:   Recent Labs   Lab 01/21/22 0424   GLU 76      K 4.6      CO2 23   BUN 35*   CREATININE 5.8*   CALCIUM 7.6*   MG 1.8     CBC:   Recent Labs   Lab 01/21/22 0424   WBC 5.83   HGB 8.9*   HCT 29.4*   *       Significant Imaging: I have reviewed all pertinent imaging results/findings within the past 24 hours.

## 2022-01-21 NOTE — PLAN OF CARE
Problem: Adult Inpatient Plan of Care  Goal: Plan of Care Review  Outcome: Ongoing, Progressing  Goal: Patient-Specific Goal (Individualized)  Outcome: Ongoing, Progressing  Goal: Absence of Hospital-Acquired Illness or Injury  Outcome: Ongoing, Progressing  Goal: Optimal Comfort and Wellbeing  Outcome: Ongoing, Progressing  Goal: Readiness for Transition of Care  Outcome: Ongoing, Progressing     Problem: Adjustment to Illness (Sepsis/Septic Shock)  Goal: Optimal Coping  Outcome: Ongoing, Progressing     Problem: Bleeding (Sepsis/Septic Shock)  Goal: Absence of Bleeding  Outcome: Ongoing, Progressing     Problem: Glycemic Control Impaired (Sepsis/Septic Shock)  Goal: Blood Glucose Level Within Desired Range  Outcome: Ongoing, Progressing     Problem: Infection Progression (Sepsis/Septic Shock)  Goal: Absence of Infection Signs and Symptoms  Outcome: Ongoing, Progressing     Problem: Nutrition Impaired (Sepsis/Septic Shock)  Goal: Optimal Nutrition Intake  Outcome: Ongoing, Progressing     Problem: Fluid and Electrolyte Imbalance (Acute Kidney Injury/Impairment)  Goal: Fluid and Electrolyte Balance  Outcome: Ongoing, Progressing     Problem: Oral Intake Inadequate (Acute Kidney Injury/Impairment)  Goal: Optimal Nutrition Intake  Outcome: Ongoing, Progressing     Problem: Renal Function Impairment (Acute Kidney Injury/Impairment)  Goal: Effective Renal Function  Outcome: Ongoing, Progressing     Problem: Fluid Imbalance (Pneumonia)  Goal: Fluid Balance  Outcome: Ongoing, Progressing     Problem: Infection (Pneumonia)  Goal: Resolution of Infection Signs and Symptoms  Outcome: Ongoing, Progressing     Problem: Respiratory Compromise (Pneumonia)  Goal: Effective Oxygenation and Ventilation  Outcome: Ongoing, Progressing     Problem: Fall Injury Risk  Goal: Absence of Fall and Fall-Related Injury  Outcome: Ongoing, Progressing     Problem: Device-Related Complication Risk (Hemodialysis)  Goal: Safe, Effective Therapy  Detail Level: Detailed Add 56018 Code?: No Delivery  Outcome: Ongoing, Progressing     Problem: Hemodynamic Instability (Hemodialysis)  Goal: Effective Tissue Perfusion  Outcome: Ongoing, Progressing     Problem: Infection (Hemodialysis)  Goal: Absence of Infection Signs and Symptoms  Outcome: Ongoing, Progressing     Problem: Impaired Wound Healing  Goal: Optimal Wound Healing  Outcome: Ongoing, Progressing      Procedure Details: A silver nitrate stick was used for chemical cautery.

## 2022-01-22 NOTE — CONSULTS
Nephrology Consult Note        Patient Name: Shara Hutchins  MRN: [id number]    Patient Class: IP- Inpatient   Admission Date: 1/16/2022  Length of Stay: 6 days  Date of Service: 1/22/2022    Attending Physician: Ashwin Olson MD  Primary Care Provider: April Horton MD    Reason for Consult: esrd/anemia/htn/shpt/vulvar cellulitis    SUBJECTIVE:     HPI: ESRD patient on HD MWF presents to the emergency department with recurrent vulvar cellulitis, complicated by chronic diarrhea since being treated with radiation. Unrelieved by any home analgesics or topical medications. Associated with intermittent low grade fever, occasional bleeding from excoriations of the skin, and hardened swelling of the affected area. She has intermittent nausea associated with the severe pain.     Imaging obtained in the emergency department incidentally revealed an obstructive, right-sided nephrolithiasis by 6 mm kidney stone causing perinephric fat stranding/hydronephrosis. The patient denied flank pain, hematuria, increased urination or urgency.    1/17  AVF non-functioning.  Consult Vascular surgery for trialysis catheter and avf evaluation  1/18  Good avf flow on ultrasound.  At a procedure  1/19  Seen on dialysis.  No cp or sob.  AVF sore  1/20  Sleeping.  No distress.  1/21  AFVSS.     No acute events   Seen on dialysis.  Some pain in vaginal area  1/22  AFVSS.  sleeping    Past Medical History:   Diagnosis Date    Cellulitis of trunk     Perineum, numerous episodes    Chronic diarrhea 02/10/2020    Short-gut syndrome    Diabetes mellitus, type 2     pt has not had for years after losing weight    End stage renal disease on dialysis 01/07/2018    Fatigue     Hypertension     Hypothyroidism 2/10/2020    Iron deficiency anemia due to chronic blood loss 1/7/2018    Pulmonary hypertension 3/20/2020    Vulvar cancer 1/7/2019     Past Surgical History:   Procedure Laterality Date    ABDOMINAL SURGERY      APPENDECTOMY      AV  FISTULA PLACEMENT Left 2018    BACK SURGERY      BLADDER FULGURATION N/A 6/16/2020    Procedure: FULGURATION, BLADDER;  Surgeon: Damari Barber Jr., MD;  Location: [facility];  Service: Urology;  Laterality: N/A;    carpel tunnel surgery once on each hand      CERVICAL FUSION      x 4    cervix repair      CHOLECYSTECTOMY  2000    COLECTOMY      CYSTOSCOPY W/ RETROGRADES  6/16/2020    Procedure: CYSTOSCOPY, WITH RETROGRADE PYELOGRAM;  Surgeon: Damari Barber Jr., MD;  Location: [facility] OR;  Service: Urology;;    CYSTOSCOPY W/ RETROGRADES Right 4/20/2021    Procedure: CYSTOSCOPY, WITH RETROGRADE PYELOGRAM;  Surgeon: Damari Barber Jr., MD;  Location: [facility] OR;  Service: Urology;  Laterality: Right;    CYSTOSCOPY W/ URETERAL STENT PLACEMENT Right 3/16/2021    Procedure: CYSTOSCOPY, WITH URETERAL STENT INSERTION;  Surgeon: Damari Barber Jr., MD;  Location: [facility] OR;  Service: Urology;  Laterality: Right;    CYSTOSCOPY W/ URETERAL STENT PLACEMENT Right 5/18/2021    Procedure: CYSTOSCOPY, WITH URETERAL STENT INSERTION;  Surgeon: Damari Barber Jr., MD;  Location: [facility] OR;  Service: Urology;  Laterality: Right;    CYSTOSCOPY W/ URETERAL STENT REMOVAL Right 4/20/2021    Procedure: CYSTOSCOPY, WITH URETERAL STENT REMOVAL;  Surgeon: Damari Barber Jr., MD;  Location: [facility] OR;  Service: Urology;  Laterality: Right;    CYSTOSCOPY WITH URETEROSCOPY, RETROGRADE PYELOGRAPHY, AND INSERTION OF STENT Right 3/30/2021    Procedure: CYSTOSCOPY, WITH RETROGRADE PYELOGRAM AND URETERAL STENT INSERTION;  Surgeon: Damari Barber Jr., MD;  Location: [facility] OR;  Service: Urology;  Laterality: Right;    CYSTOURETEROSCOPY WITH RETROGRADE PYELOGRAPHY AND INSERTION OF STENT INTO URETER Right 1/18/2022    Procedure: CYSTOURETEROSCOPY, WITH RETROGRADE PYELOGRAM AND URETERAL STENT INSERTION;  Surgeon: Damari Barber Jr., MD;  Location: [facility] OR;  Service: Urology;  Laterality: Right;    DIAGNOSTIC LAPAROSCOPY N/A 2/13/2020    Procedure:  LAPAROSCOPY, DIAGNOSTIC;  Surgeon: Robbi Lovell III, MD;  Location: [facility] OR;  Service: General;  Laterality: N/A;    HYSTERECTOMY  1980    ILEOSTOMY N/A 2/13/2020    Procedure: CREATION, ILEOSTOMY Loop;  Surgeon: Robbi Lovell III, MD;  Location: [facility] OR;  Service: General;  Laterality: N/A;    ILEOSTOMY CLOSURE N/A 5/18/2020    Procedure: CLOSURE, ILEOSTOMY;  Surgeon: Robbi Lovell III, MD;  Location: [facility] OR;  Service: General;  Laterality: N/A;    LASER LITHOTRIPSY Right 3/30/2021    Procedure: LITHOTRIPSY, USING LASER;  Surgeon: Damari Barber Jr., MD;  Location: [facility] OR;  Service: Urology;  Laterality: Right;    LUMBAR LAMINECTOMY      LYSIS OF ADHESIONS N/A 2/13/2020    Procedure: LYSIS, ADHESIONS;  Surgeon: Robbi Lovell III, MD;  Location: [facility] OR;  Service: General;  Laterality: N/A;    NECK SURGERY      PHLEBOGRAPHY  2/28/2020    Procedure: VENOGRAM;  Surgeon: Robbi Lovell III, MD;  Location: [facility] OR;  Service: General;;    REMOVAL OF VASCULAR ACCESS PORT Right 2/13/2020    Procedure: REMOVAL, VASCULAR ACCESS PORT;  Surgeon: Robbi Lovell III, MD;  Location: [facility] OR;  Service: General;  Laterality: Right;    RETROGRADE PYELOGRAPHY  3/16/2021    Procedure: PYELOGRAM, RETROGRADE;  Surgeon: Damari Barber Jr., MD;  Location: [facility] OR;  Service: Urology;;    RETROGRADE PYELOGRAPHY Right 5/18/2021    Procedure: PYELOGRAM, RETROGRADE;  Surgeon: Damari Barber Jr., MD;  Location: [facility] OR;  Service: Urology;  Laterality: Right;    SHOULDER SURGERY      URETEROSCOPIC REMOVAL OF URETERIC CALCULUS Right 3/30/2021    Procedure: REMOVAL, CALCULUS, URETER, URETEROSCOPIC;  Surgeon: Damari Barber Jr., MD;  Location: [facility] OR;  Service: Urology;  Laterality: Right;    URETEROSCOPIC REMOVAL OF URETERIC CALCULUS Right 4/20/2021    Procedure: REMOVAL, CALCULUS, URETER, URETEROSCOPIC;  Surgeon: Damari Barber Jr., MD;  Location: [facility] OR;  Service: Urology;  Laterality: Right;     vulva cancer       Family History   Problem Relation Age of Onset    Heart disease Mother     Hypertension Mother     Cancer Mother         lung    Heart disease Father     Heart disease Sister         Open heart surgery    No Known Problems Daughter      Social History     Tobacco Use    Smoking status: Former Smoker     Packs/day: 1.00     Years: 33.00     Pack years: 33.00     Types: Cigarettes     Quit date: 2000     Years since quittin.0    Smokeless tobacco: Former User     Quit date:    Substance Use Topics    Alcohol use: No    Drug use: No       Review of patient's allergies indicates:   Allergen Reactions    Ciprofloxacin Other (See Comments)     Elevated liver enzymes      Pcn [penicillins] Anaphylaxis     TOLERATES ROCEPHIN WITHOUT DIFFICULTY       Outpatient meds:  Current Facility-Administered Medications on File Prior to Encounter   Medication Dose Route Frequency Provider Last Rate Last Admin    electrolyte-S (ISOLYTE)   Intravenous Continuous Raz Ruff MD        lactated ringers infusion   Intravenous Continuous Ruy Beltre MD        LIDOcaine (PF) 10 mg/ml (1%) injection 5 mg  0.5 mL Intradermal Once Raz Ruff MD         Current Outpatient Medications on File Prior to Encounter   Medication Sig Dispense Refill    CHOLESTYRAMINE LIGHT 4 gram packet Take 1 packet by mouth 2 (two) times daily.      cyclobenzaprine (FLEXERIL) 10 MG tablet Take 1 tablet (10 mg total) by mouth 3 (three) times daily as needed for Muscle spasms. 30 tablet 1    diltiaZEM (CARDIZEM CD) 120 MG Cp24 Take 1 capsule (120 mg total) by mouth once daily. 90 capsule 0    diphenhydrAMINE (BENADRYL) 25 mg capsule Take 25 mg by mouth every 12 (twelve) hours as needed for Itching.      gabapentin (NEURONTIN) 100 MG capsule Take 100 mg by mouth nightly as needed. HCS      levothyroxine (SYNTHROID) 88 MCG tablet Take 1 tablet (88 mcg total) by mouth before breakfast. 90 tablet 0     promethazine (PHENERGAN) 25 MG tablet TAKE 1 TABLET(25 MG) BY MOUTH TWICE DAILY AS NEEDED FOR NAUSEA (Patient taking differently: Take 25 mg by mouth 2 (two) times daily as needed for Nausea.) 60 tablet 0    sertraline (ZOLOFT) 50 MG tablet Take 50 mg by mouth every evening.      doxycycline (VIBRAMYCIN) 100 MG Cap Take 100 mg by mouth every 12 (twelve) hours. HCS      ferric citrate (AURYXIA) 210 mg iron Tab Take 2 tablets by mouth 3 (three) times daily with meals.      traMADoL (ULTRAM) 50 mg tablet Take 1 tablet (50 mg total) by mouth every 8 (eight) hours as needed for Pain. 40 tablet 0       Scheduled meds:   cefTRIAXone (ROCEPHIN) IVPB  1 g Intravenous Q24H    cholestyramine-aspartame  1 packet Oral BID    diltiaZEM  120 mg Oral Daily    enoxaparin  30 mg Subcutaneous Daily    fluconazole  100 mg Oral QHS    levothyroxine  88 mcg Oral Before breakfast    miconazole   Topical (Top) BID    Questran and Aquaphor Topical Compound   Topical (Top) BID    sertraline  50 mg Oral QHS       Infusions:      PRN meds:  acetaminophen, cyclobenzaprine, diphenhydrAMINE, gabapentin, LIDOcaine-prilocaine, morphine, ondansetron, oxyCODONE-acetaminophen, sodium chloride 0.9%, sodium chloride 0.9%, vancomycin - pharmacy to dose    Review of Systems:  Review of Systems   Constitutional: Negative for chills, fever, malaise/fatigue and weight loss.   HENT: Negative for hearing loss and nosebleeds.    Eyes: Negative for blurred vision, double vision and photophobia.   Respiratory: Negative for cough, shortness of breath and wheezing.    Cardiovascular: Negative for chest pain, palpitations and leg swelling.   Gastrointestinal: Negative for abdominal pain, constipation, diarrhea, heartburn, nausea and vomiting.   Genitourinary: Negative for dysuria, frequency and urgency.   Musculoskeletal: Negative for falls, joint pain and myalgias.   Skin: Negative for itching and rash.   Neurological: Negative for dizziness, speech  change, focal weakness, loss of consciousness and headaches.   Endo/Heme/Allergies: Does not bruise/bleed easily.   Psychiatric/Behavioral: Negative for depression and substance abuse. The patient is not nervous/anxious.      OBJECTIVE:     Vital Signs and IO (Last 24H):  Temp:  [97.6 °F (36.4 °C)-99.9 °F (37.7 °C)]   Pulse:  [62-88]   Resp:  [18]   BP: (111-151)/(55-74)   SpO2:  [93 %-100 %]   I/O last 3 completed shifts:  In: 1450 [P.O.:700; Other:500; IV Piggyback:250]  Out: 3426 [Other:3426]    Wt Readings from Last 5 Encounters:   01/18/22 68.3 kg (150 lb 9.2 oz)   09/15/21 63 kg (139 lb)   05/24/21 63 kg (139 lb)   05/19/21 66.7 kg (147 lb 0.8 oz)   05/13/21 63.5 kg (139 lb 15.9 oz)     Physical Exam:  Physical Exam  Constitutional:       Appearance: She is well-developed. She is not diaphoretic.   HENT:      Head: Normocephalic and atraumatic.   Eyes:      General: No scleral icterus.     Pupils: Pupils are equal, round, and reactive to light.   Cardiovascular:      Rate and Rhythm: Normal rate and regular rhythm.   Pulmonary:      Effort: Pulmonary effort is normal. No respiratory distress.      Breath sounds: No stridor.   Abdominal:      General: There is no distension.      Palpations: Abdomen is soft.   Musculoskeletal:         General: No deformity. Normal range of motion.      Cervical back: Neck supple.   Skin:     General: Skin is warm and dry.      Findings: No erythema or rash.   Neurological:      Mental Status: She is alert and oriented to person, place, and time.      Cranial Nerves: No cranial nerve deficit.   Psychiatric:         Behavior: Behavior normal.       Body mass index is 23.24 kg/m².    Laboratory:  Recent Labs   Lab 01/18/22  0412 01/21/22  0424 01/22/22  0426   * 138 140   K 4.4 4.6 4.2    106 103   CO2 20* 23 25   BUN 36* 35* 22   CREATININE 6.7* 5.8* 4.0*   ESTGFRAFRICA 6.6* 7.8* 12.3*   EGFRNONAA 5.7* 6.8* 10.6*   GLU 79 76 82       Recent Labs   Lab 01/16/22  6737  01/16/22  1115 01/17/22  0438 01/17/22  0438 01/18/22  0412 01/21/22  0424 01/22/22  0426   CALCIUM 8.3*   < > 8.0*   < > 8.2* 7.6* 8.5*   ALBUMIN 3.1*  --   --   --   --   --   --    PHOS  --   --  6.4*  --  6.6*  --   --    MG 1.6   < > 1.5*  --  1.9 1.8  --     < > = values in this interval not displayed.       Recent Labs   Lab 09/30/19  0820 11/15/19  0748   PTH, Intact 176.4 H 238.4 H       No results for input(s): POCTGLUCOSE in the last 168 hours.    Recent Labs   Lab 11/15/19  0748 01/22/20  0848 05/25/21  0847   Hemoglobin A1C Invalid A SEE COMMENT 4.6       Recent Labs   Lab 01/16/22  1115 01/17/22  0438 01/19/22  0325 01/21/22  0424 01/22/22  0427   WBC 7.12   < > 4.28 5.83 5.73   HGB 9.4*   < > 9.5* 8.9* 9.4*   HCT 29.7*   < > 30.6* 29.4* 30.3*   *   < > 136* 144* 121*   *   < > 117* 120* 120*   MCHC 31.6*   < > 31.0* 30.3* 31.0*   MONO 5.1  0.4  --   --   --   --     < > = values in this interval not displayed.       Recent Labs   Lab 01/16/22  1115   BILITOT 0.8   PROT 6.0   ALBUMIN 3.1*   ALKPHOS 98   ALT 10   AST 12       Recent Labs   Lab 12/09/20  2323 12/09/20  2323 03/15/21  1533 05/17/21  1819 01/18/22  0331   Color, UA Yellow   < > Yellow Yellow Yellow   Appearance, UA Cloudy A   < > Cloudy A Hazy A Clear   pH, UA 6.0   < > 7.0 8.0 7.0   Specific [place], UA 1.020   < > 1.015 1.015 1.010   Protein, UA 1+ A   < > 2+ A 2+ A Trace A   Glucose, UA Negative   < > Negative Negative Negative   Ketones, UA Negative   < > Negative Negative Negative   Urobilinogen, UA Negative   < > Negative Negative Negative   Bilirubin (UA) Negative   < > Negative Negative Negative   Occult Blood UA 3+ A   < > 2+ A 2+ A 2+ A   Nitrite, UA Negative   < > Negative Negative Negative   RBC, UA 1   < > 21 H 7 H 10 H   WBC, UA >100 H   < > >100 H 80 H 50 H   Bacteria Negative  --  Negative Few A  --    Hyaline Casts, UA 10 A  --  40 A 0  --     < > = values in this interval not displayed.       Recent Labs    Lab 03/16/20  1551 12/08/20 2024   POC PH 7.402 7.370   POC PCO2 39.9 39.0   POC HCO3 24.9 22.5 L   POC PO2 58 LL 85   POC SATURATED O2 90 L 96   POC BE 0 -3   Sample ARTERIAL ARTERIAL       Microbiology Results (last 7 days)     Procedure Component Value Units Date/Time    Blood culture #2 **CANNOT BE ORDERED STAT** [[id number]] Collected: 01/16/22 1130    Order Status: Completed Specimen: Blood from Peripheral, Antecubital, Right Updated: 01/21/22 1432     Blood Culture, Routine No growth after 5 days.    Blood culture #1 **CANNOT BE ORDERED STAT** [[id number]] Collected: 01/16/22 1115    Order Status: Completed Specimen: Blood from Peripheral, Antecubital, Right Updated: 01/21/22 1232     Blood Culture, Routine No growth after 5 days.    Urine culture [[id number]] Collected: 01/18/22 0331    Order Status: Completed Specimen: Urine Updated: 01/20/22 0928     Urine Culture, Routine No growth    Narrative:      Specimen Source->Urine        ASSESSMENT/PLAN:     ESRD on HD MWF via AVF  Continue current dialysis prescription.  Next HD per schedule.  Renal diet - low K, low phos.  No IVs or BP checks on access and/or non-dominant arm.     Anemia of CKD  Hgb and HCT are acceptable. Monitor.  Will provide ADAM and/or IV iron PRN.    MBD / Secondary HPT  Ca, phos, PTH and vitamin D levels are acceptable.   Phos binders, vitamin D analogues and calcimimetics as needed.    HTN  BP seem controlled.   Tolerate asymptomatic HTN up to -160.  Continue home meds.  Low sodium diet.    Thank you for allowing us to participate in the care of your patient!   We will follow the patient and provide recommendations as needed.    Patient care time was spent personally by me on the following activities:   · Obtaining a history.  · Examination of patient.  · Providing medical care at the patients bedside.  · Developing a treatment plan with patient or surrogate and bedside caregivers.  · Ordering and reviewing laboratory studies,  radiographic studies, pulse oximetry.  · Ordering and performing treatments and interventions.  · Evaluation of patient's response to treatment.  · Discussions with consultants while on the unit and immediately available to the patient.  · Re-evaluation of the patient's condition.  · Documentation in the medical record.     Raúl Dietz MD    [facility]  [address] [place] [address]  [place] [address]    [phone] - tel  [phone] - fax    1/22/2022

## 2022-01-22 NOTE — SUBJECTIVE & OBJECTIVE
Interval History: vaginitis slowly improving; will need catheter for RRT    Review of Systems   Constitutional: Positive for fatigue.   HENT: Negative.    Eyes: Negative.    Respiratory: Negative.    Cardiovascular: Negative.    Gastrointestinal: Negative.    Endocrine: Negative.    Genitourinary: Positive for vaginal pain.   Musculoskeletal: Negative.    Skin: Negative.    Allergic/Immunologic: Negative.    Neurological: Negative.    Hematological: Negative.    All other systems reviewed and are negative.    Objective:     Vital Signs (Most Recent):  Temp: 98.2 °F (36.8 °C) (01/22/22 1219)  Pulse: 85 (01/22/22 1219)  Resp: 16 (01/22/22 1245)  BP: (!) 154/78 (01/22/22 1219)  SpO2: 96 % (01/22/22 1219) Vital Signs (24h Range):  Temp:  [97.6 °F (36.4 °C)-99.9 °F (37.7 °C)] 98.2 °F (36.8 °C)  Pulse:  [62-88] 85  Resp:  [16-18] 16  SpO2:  [93 %-100 %] 96 %  BP: (121-154)/(57-78) 154/78     Weight: 68.3 kg (150 lb 9.2 oz)  Body mass index is 23.24 kg/m².    Intake/Output Summary (Last 24 hours) at 1/22/2022 1443  Last data filed at 1/22/2022 0600  Gross per 24 hour   Intake 1450 ml   Output 3426 ml   Net -1976 ml      Physical Exam  Vitals and nursing note reviewed.   Constitutional:       Appearance: She is well-developed and well-nourished.   HENT:      Head: Normocephalic and atraumatic.      Right Ear: External ear normal.      Left Ear: External ear normal.      Nose: Nose normal.      Mouth/Throat:      Mouth: Oropharynx is clear and moist.   Eyes:      Extraocular Movements: EOM normal.      Conjunctiva/sclera: Conjunctivae normal.      Pupils: Pupils are equal, round, and reactive to light.   Cardiovascular:      Rate and Rhythm: Normal rate and regular rhythm.      Pulses: Intact distal pulses.      Heart sounds: Normal heart sounds.      Comments: Thrill LUE  Pulmonary:      Effort: Pulmonary effort is normal.      Breath sounds: Normal breath sounds.   Abdominal:      General: Bowel sounds are normal.       Palpations: Abdomen is soft.   Musculoskeletal:         General: Normal range of motion.      Cervical back: Normal range of motion and neck supple.   Skin:     General: Skin is warm and dry.      Capillary Refill: Capillary refill takes less than 2 seconds.   Neurological:      Mental Status: She is alert and oriented to person, place, and time.   Psychiatric:         Mood and Affect: Mood and affect normal.         Behavior: Behavior normal.         Thought Content: Thought content normal.         Judgment: Judgment normal.         Significant Labs:   All pertinent labs within the past 24 hours have been reviewed.  BMP:   Recent Labs   Lab 01/21/22 0424 01/21/22 0424 01/22/22 0426   GLU 76   < > 82      < > 140   K 4.6   < > 4.2      < > 103   CO2 23   < > 25   BUN 35*   < > 22   CREATININE 5.8*   < > 4.0*   CALCIUM 7.6*   < > 8.5*   MG 1.8  --   --     < > = values in this interval not displayed.     CBC:   Recent Labs   Lab 01/21/22 0424 01/22/22 0427   WBC 5.83 5.73   HGB 8.9* 9.4*   HCT 29.4* 30.3*   * 121*       Significant Imaging: I have reviewed all pertinent imaging results/findings within the past 24 hours.

## 2022-01-22 NOTE — PROGRESS NOTES
01/21/22 1720   Handoff Report   Received From Kandi Mitchell RN   Given To Kori Tabor RN   Vital Signs   Temp 99.9 °F (37.7 °C)   Temp src Oral   Pulse 69   Heart Rate Source Monitor   Resp 18   SpO2 100 %   /60        Hemodialysis AV Fistula Left upper arm   No Placement Date or Time found.   Present Prior to Hospital Arrival?: Yes  Location: Left upper arm   Needle Size 16ga   Site Assessment Clean;Dry;Intact;Ecchymotic   Patency Present;Thrill;Bruit   Status Deaccessed   Flows Good   Dressing Intervention First dressing   Dressing Status Clean;Dry;Intact   Site Condition No complications   Dressing Gauze   During Hemodialysis Assessment   UF Removed (mL) 3426 mL   Intra-Hemodialysis Comments tx complete   Post-Hemodialysis Assessment   Rinseback Volume (mL) 250 mL   Blood Volume Processed (Liters) 45.2 L   Dialyzer Clearance Lightly streaked   Duration of Treatment (minutes) 180 minutes   Hemodialysis Intake (mL) 500 mL   Total UF (mL) 3426 mL   Net Fluid Removal 2926   Patient Response to Treatment [facility] well   Post-Treatment Weight 65.3 kg (143 lb 15.4 oz)   Treatment Weight Change -3   Arterial bleeding stop time (min) 5 min   Venous bleeding stop time (min) 5 min   Post-Hemodialysis Comments tx complete

## 2022-01-22 NOTE — PROGRESS NOTES
[facility]  Progress Note    Patient Name: Shara Hutchins  MRN: [id number]  Patient Class: IP- Inpatient   Admission Date: 1/16/2022  Length of Stay: 6 days  Attending Physician: Ashwin Olson MD  Primary Care Provider: April Horton MD        Subjective:     Principal Problem:ESRD on dialysis        HPI:  Patient presents to the emergency department with vulvar cellulitis that is recurrent.  The patient states that about 1 week ago she developed itching, redness, increased pain to the vulva.  She has a pertinent past medical history of fall vol cancer status post radiation and has since had recurrent episodes of cellulitis in the area.  She reports applying different types of creams such as Neosporin, Reginald Butt paste, calseptin, Aquaphor, and others with no improvement and about 3 days ago the symptoms worsened.  Her history is also complicated by chronic diarrhea since being treated with radiation in which she has loose incontinent stools multiple times per day.     The patient's current problem of cellulitis begins at superior-most aspect of her vulva and extends across the entire perineum to between the gluteal folds. The cellulitis also extends into the groin bilaterally.  The patient's pain has progressed to severe, nonradiating, described as burning, itching and dull.  Unrelieved by any home analgesics or topical medications.  Associated with intermittent low grade fever (not measured at home), occasional bleeding from excoriations of the skin, and hardened swelling of the affected area.  Or she also has intermittent nausea she believes is associated with the severe pain.  No vomiting.  The patient has been evaluated and treated by Dr. Blanca Neal in the past.  The patient was treated with meropenem and vancomycin in the emergency department.  PCN allergy noted and the patient reported tolerating meropenem and the past.     Imaging obtained in the emergency department  "incidentally revealed an obstructive, right-sided nephrolithiasis by 6 mm kidney stone that is causing perinephric fat stranding/hydronephrosis..  She has a history of nephrolithiasis and a CT.  The patient did receive IV contrast in the emergency department for this imaging and has hemodialysis 3 times a week overseen by Dr. Dietz.  The patient denied flank pain, hematuria, increased urination or urgency.  She does have a history of anuria.      Overview/Hospital Course:  01/18  Assumed care. Chart reviewed. Patient seen at [facility]: after cystoscopy/stent. Feels "Good". Labs reviewed and noted below: no leukocytosis with stable macrocytic anemia; trivial hyponatremia with end stage renal dysfunction    01/19  Had stent re-do today, followed by [facility]. Only complaint is vaginitis is persisting. Feels "Good" and would like to be discharged when cleared by Renal and ID. Labs reviewed and noted below: no leukocytosis with stable macrocytic anemia. Continue current regimen.     01/20  Discussed with Wound Care: to make poultice with Aquaphor and Questran for vaginal topical application. Vaginal area continues to be source of great discomfort. For RRT in AM. Lab holiday today. Ordered for AM before RRT    01/21  Discussed with Vascular and Renal: may need to allow fistula to rest--RRT RN had difficult time cannulating fistula--but it is functioning properly. Vaginitis has improved with current therapy. Discussed with : to order Home Health at discharge for poultice application and vaginitis follow-up after discharge. Will monitor over weekend and see how difficult fistula is at [facility] on 01/224. Plan: continue current regimen; lab holiday in AM.    01/22  Discussed again with Vascular and Renal: will have catheter placed to give fistula some rest time. Patient feels "OK". Vaginal area is less painful with current regimen. Lab holiday today. Patient request cardiac monitoring to be discontinued--ordered      Interval " History: vaginitis slowly improving; will need catheter for RRT    Review of Systems   Constitutional: Positive for fatigue.   HENT: Negative.    Eyes: Negative.    Respiratory: Negative.    Cardiovascular: Negative.    Gastrointestinal: Negative.    Endocrine: Negative.    Genitourinary: Positive for vaginal pain.   Musculoskeletal: Negative.    Skin: Negative.    Allergic/Immunologic: Negative.    Neurological: Negative.    Hematological: Negative.    All other systems reviewed and are negative.    Objective:     Vital Signs (Most Recent):  Temp: 98.2 °F (36.8 °C) (01/22/22 1219)  Pulse: 85 (01/22/22 1219)  Resp: 16 (01/22/22 1245)  BP: (!) 154/78 (01/22/22 1219)  SpO2: 96 % (01/22/22 1219) Vital Signs (24h Range):  Temp:  [97.6 °F (36.4 °C)-99.9 °F (37.7 °C)] 98.2 °F (36.8 °C)  Pulse:  [62-88] 85  Resp:  [16-18] 16  SpO2:  [93 %-100 %] 96 %  BP: (121-154)/(57-78) 154/78     Weight: 68.3 kg (150 lb 9.2 oz)  Body mass index is 23.24 kg/m².    Intake/Output Summary (Last 24 hours) at 1/22/2022 1443  Last data filed at 1/22/2022 0600  Gross per 24 hour   Intake 1450 ml   Output 3426 ml   Net -1976 ml      Physical Exam  Vitals and nursing note reviewed.   Constitutional:       Appearance: She is well-developed and well-nourished.   HENT:      Head: Normocephalic and atraumatic.      Right Ear: External ear normal.      Left Ear: External ear normal.      Nose: Nose normal.      Mouth/Throat:      Mouth: Oropharynx is clear and moist.   Eyes:      Extraocular Movements: EOM normal.      Conjunctiva/sclera: Conjunctivae normal.      Pupils: Pupils are equal, round, and reactive to light.   Cardiovascular:      Rate and Rhythm: Normal rate and regular rhythm.      Pulses: Intact distal pulses.      Heart sounds: Normal heart sounds.      Comments: Thrill LUE  Pulmonary:      Effort: Pulmonary effort is normal.      Breath sounds: Normal breath sounds.   Abdominal:      General: Bowel sounds are normal.      Palpations:  Abdomen is soft.   Musculoskeletal:         General: Normal range of motion.      Cervical back: Normal range of motion and neck supple.   Skin:     General: Skin is warm and dry.      Capillary Refill: Capillary refill takes less than 2 seconds.   Neurological:      Mental Status: She is alert and oriented to person, place, and time.   Psychiatric:         Mood and Affect: Mood and affect normal.         Behavior: Behavior normal.         Thought Content: Thought content normal.         Judgment: Judgment normal.         Significant Labs:   All pertinent labs within the past 24 hours have been reviewed.  BMP:   Recent Labs   Lab 01/21/22  0424 01/21/22 0424 01/22/22 0426   GLU 76   < > 82      < > 140   K 4.6   < > 4.2      < > 103   CO2 23   < > 25   BUN 35*   < > 22   CREATININE 5.8*   < > 4.0*   CALCIUM 7.6*   < > 8.5*   MG 1.8  --   --     < > = values in this interval not displayed.     CBC:   Recent Labs   Lab 01/21/22 0424 01/22/22 0427   WBC 5.83 5.73   HGB 8.9* 9.4*   HCT 29.4* 30.3*   * 121*       Significant Imaging: I have reviewed all pertinent imaging results/findings within the past 24 hours.      Assessment/Plan:      No notes have been filed under this hospital service.  Service: Hospital Medicine    VTE Risk Mitigation (From admission, onward)         Ordered     enoxaparin injection 30 mg  Daily         01/16/22 1502     IP VTE HIGH RISK PATIENT  Once         01/16/22 1502     Place sequential compression device  Until discontinued         01/16/22 1502                Discharge Planning   RODRIGO: 1/23/2022     Code Status: Full Code   Is the patient medically ready for discharge?:     Reason for patient still in hospital (select all that apply): Patient trending condition and Treatment  Discharge Plan A: Assisted Living   Discharge Delays: None known at this time              Ashwin Olson MD  Department of Hospital Medicine   [facility]

## 2022-01-22 NOTE — PLAN OF CARE
Problem: Adult Inpatient Plan of Care  Goal: Plan of Care Review  Outcome: Ongoing, Progressing  Goal: Patient-Specific Goal (Individualized)  Outcome: Ongoing, Progressing  Goal: Absence of Hospital-Acquired Illness or Injury  Outcome: Ongoing, Progressing  Goal: Optimal Comfort and Wellbeing  Outcome: Ongoing, Progressing  Goal: Readiness for Transition of Care  Outcome: Ongoing, Progressing     Problem: Adjustment to Illness (Sepsis/Septic Shock)  Goal: Optimal Coping  Outcome: Ongoing, Progressing     Problem: Bleeding (Sepsis/Septic Shock)  Goal: Absence of Bleeding  Outcome: Ongoing, Progressing     Problem: Glycemic Control Impaired (Sepsis/Septic Shock)  Goal: Blood Glucose Level Within Desired Range  Outcome: Ongoing, Progressing     Problem: Infection Progression (Sepsis/Septic Shock)  Goal: Absence of Infection Signs and Symptoms  Outcome: Ongoing, Progressing     Problem: Nutrition Impaired (Sepsis/Septic Shock)  Goal: Optimal Nutrition Intake  Outcome: Ongoing, Progressing     Problem: Fluid and Electrolyte Imbalance (Acute Kidney Injury/Impairment)  Goal: Fluid and Electrolyte Balance  Outcome: Ongoing, Progressing     Problem: Oral Intake Inadequate (Acute Kidney Injury/Impairment)  Goal: Optimal Nutrition Intake  Outcome: Ongoing, Progressing     Problem: Renal Function Impairment (Acute Kidney Injury/Impairment)  Goal: Effective Renal Function  Outcome: Ongoing, Progressing     Problem: Fluid Imbalance (Pneumonia)  Goal: Fluid Balance  Outcome: Ongoing, Progressing     Problem: Infection (Pneumonia)  Goal: Resolution of Infection Signs and Symptoms  Outcome: Ongoing, Progressing     Problem: Respiratory Compromise (Pneumonia)  Goal: Effective Oxygenation and Ventilation  Outcome: Ongoing, Progressing     Problem: Fall Injury Risk  Goal: Absence of Fall and Fall-Related Injury  Outcome: Ongoing, Progressing     Problem: Device-Related Complication Risk (Hemodialysis)  Goal: Safe, Effective Therapy  Delivery  Outcome: Ongoing, Progressing     Problem: Hemodynamic Instability (Hemodialysis)  Goal: Effective Tissue Perfusion  Outcome: Ongoing, Progressing     Problem: Infection (Hemodialysis)  Goal: Absence of Infection Signs and Symptoms  Outcome: Ongoing, Progressing     Problem: Impaired Wound Healing  Goal: Optimal Wound Healing  Outcome: Ongoing, Progressing     Problem: Malnutrition  Goal: Improved Nutritional Intake  Outcome: Ongoing, Progressing

## 2022-01-22 NOTE — PROGRESS NOTES
Pharmacokinetic Assessment Follow Up: IV Vancomycin    Vancomycin serum concentration assessment(s):    The random level was drawn correctly and can be used to guide therapy at this time. The measurement is above the desired definitive target range of 10 to 15 mcg/mL.    Vancomycin Regimen Plan:    Re-dose when the random level is less than 15 mcg/mL, next level to be drawn at 0430 on 01/25    Drug levels (last 3 results):  Recent Labs   Lab Result Units 01/20/22  0429 01/22/22  0426   Vancomycin, Random ug/mL 13.6 21.2       Pharmacy will continue to follow and monitor vancomycin.    Please contact pharmacy at extension [phone] for questions regarding this assessment.    Thank you for the consult,   Otoniel Khan       Patient brief summary:  Shara Hutchins is a 71 y.o. female initiated on antimicrobial therapy with IV Vancomycin for treatment of skin & soft tissue infection    Drug Allergies:   Review of patient's allergies indicates:   Allergen Reactions    Ciprofloxacin Other (See Comments)     Elevated liver enzymes      Pcn [penicillins] Anaphylaxis     TOLERATES ROCEPHIN WITHOUT DIFFICULTY       Actual Body Weight:   68.3 kg    Renal Function:   Estimated Creatinine Clearance: 12.8 mL/min (A) (based on SCr of 4 mg/dL (H)).,     CBC (last 72 hours):  Recent Labs   Lab Result Units 01/21/22  0424 01/22/22  0427   WBC K/uL 5.83 5.73   Hemoglobin g/dL 8.9* 9.4*   Hematocrit % 29.4* 30.3*   Platelets K/uL 144* 121*       Metabolic Panel (last 72 hours):  Recent Labs   Lab Result Units 01/21/22  0424 01/22/22  0426   Sodium mmol/L 138 140   Potassium mmol/L 4.6 4.2   Chloride mmol/L 106 103   CO2 mmol/L 23 25   Glucose mg/dL 76 82   BUN mg/dL 35* 22   Creatinine mg/dL 5.8* 4.0*   Magnesium mg/dL 1.8  --        Vancomycin Administrations:  vancomycin given in the last 96 hours                     vancomycin 1.25 g in dextrose 5% 250 mL IVPB (ready to mix) (mg) 1,250 mg New Bag 01/20/22 4857                    Microbiologic  Results:  Microbiology Results (last 7 days)       Procedure Component Value Units Date/Time    Blood culture #2 **CANNOT BE ORDERED STAT** [[id number]] Collected: 01/16/22 1130    Order Status: Completed Specimen: Blood from Peripheral, Antecubital, Right Updated: 01/21/22 1432     Blood Culture, Routine No growth after 5 days.    Blood culture #1 **CANNOT BE ORDERED STAT** [[id number]] Collected: 01/16/22 1115    Order Status: Completed Specimen: Blood from Peripheral, Antecubital, Right Updated: 01/21/22 1232     Blood Culture, Routine No growth after 5 days.    Urine culture [[id number]] Collected: 01/18/22 0331    Order Status: Completed Specimen: Urine Updated: 01/20/22 0928     Urine Culture, Routine No growth    Narrative:      Specimen Source->Urine

## 2022-01-22 NOTE — PLAN OF CARE
Problem: Adult Inpatient Plan of Care  Goal: Plan of Care Review  Outcome: Ongoing, Progressing  Goal: Patient-Specific Goal (Individualized)  Outcome: Ongoing, Progressing  Goal: Absence of Hospital-Acquired Illness or Injury  Outcome: Ongoing, Progressing  Goal: Optimal Comfort and Wellbeing  Outcome: Ongoing, Progressing  Goal: Readiness for Transition of Care  Outcome: Ongoing, Progressing     Problem: Adjustment to Illness (Sepsis/Septic Shock)  Goal: Optimal Coping  Outcome: Ongoing, Progressing     Problem: Bleeding (Sepsis/Septic Shock)  Goal: Absence of Bleeding  Outcome: Ongoing, Progressing

## 2022-01-23 NOTE — CONSULTS
Nephrology Consult Note        Patient Name: Shara Hutchins  MRN: [id number]    Patient Class: IP- Inpatient   Admission Date: 1/16/2022  Length of Stay: 7 days  Date of Service: 1/23/2022    Attending Physician: Ashwin Olson MD  Primary Care Provider: April Horton MD    Reason for Consult: esrd/anemia/htn/shpt/vulvar cellulitis    SUBJECTIVE:     HPI: ESRD patient on HD MWF presents to the emergency department with recurrent vulvar cellulitis, complicated by chronic diarrhea since being treated with radiation. Unrelieved by any home analgesics or topical medications. Associated with intermittent low grade fever, occasional bleeding from excoriations of the skin, and hardened swelling of the affected area. She has intermittent nausea associated with the severe pain.     Imaging obtained in the emergency department incidentally revealed an obstructive, right-sided nephrolithiasis by 6 mm kidney stone causing perinephric fat stranding/hydronephrosis. The patient denied flank pain, hematuria, increased urination or urgency.    1/17  AVF non-functioning.  Consult Vascular surgery for trialysis catheter and avf evaluation  1/18  Good avf flow on ultrasound.  At a procedure  1/19  Seen on dialysis.  No cp or sob.  AVF sore  1/20  Sleeping.  No distress.  1/21  AFVSS.     No acute events   Seen on dialysis.  Some pain in vaginal area  1/22  AFVSS.  sleeping    Past Medical History:   Diagnosis Date    Cellulitis of trunk     Perineum, numerous episodes    Chronic diarrhea 02/10/2020    Short-gut syndrome    Diabetes mellitus, type 2     pt has not had for years after losing weight    End stage renal disease on dialysis 01/07/2018    Fatigue     Hypertension     Hypothyroidism 2/10/2020    Iron deficiency anemia due to chronic blood loss 1/7/2018    Pulmonary hypertension 3/20/2020    Vulvar cancer 1/7/2019     Past Surgical History:   Procedure Laterality Date    ABDOMINAL SURGERY      APPENDECTOMY      AV  FISTULA PLACEMENT Left 2018    BACK SURGERY      BLADDER FULGURATION N/A 6/16/2020    Procedure: FULGURATION, BLADDER;  Surgeon: Damari Barber Jr., MD;  Location: [facility];  Service: Urology;  Laterality: N/A;    carpel tunnel surgery once on each hand      CERVICAL FUSION      x 4    cervix repair      CHOLECYSTECTOMY  2000    COLECTOMY      CYSTOSCOPY W/ RETROGRADES  6/16/2020    Procedure: CYSTOSCOPY, WITH RETROGRADE PYELOGRAM;  Surgeon: Damari Barber Jr., MD;  Location: [facility] OR;  Service: Urology;;    CYSTOSCOPY W/ RETROGRADES Right 4/20/2021    Procedure: CYSTOSCOPY, WITH RETROGRADE PYELOGRAM;  Surgeon: Damari Barber Jr., MD;  Location: [facility] OR;  Service: Urology;  Laterality: Right;    CYSTOSCOPY W/ URETERAL STENT PLACEMENT Right 3/16/2021    Procedure: CYSTOSCOPY, WITH URETERAL STENT INSERTION;  Surgeon: Damari Barber Jr., MD;  Location: [facility] OR;  Service: Urology;  Laterality: Right;    CYSTOSCOPY W/ URETERAL STENT PLACEMENT Right 5/18/2021    Procedure: CYSTOSCOPY, WITH URETERAL STENT INSERTION;  Surgeon: Damari Barber Jr., MD;  Location: [facility] OR;  Service: Urology;  Laterality: Right;    CYSTOSCOPY W/ URETERAL STENT REMOVAL Right 4/20/2021    Procedure: CYSTOSCOPY, WITH URETERAL STENT REMOVAL;  Surgeon: Damari Barber Jr., MD;  Location: [facility] OR;  Service: Urology;  Laterality: Right;    CYSTOSCOPY WITH URETEROSCOPY, RETROGRADE PYELOGRAPHY, AND INSERTION OF STENT Right 3/30/2021    Procedure: CYSTOSCOPY, WITH RETROGRADE PYELOGRAM AND URETERAL STENT INSERTION;  Surgeon: Damari Barber Jr., MD;  Location: [facility] OR;  Service: Urology;  Laterality: Right;    CYSTOURETEROSCOPY WITH RETROGRADE PYELOGRAPHY AND INSERTION OF STENT INTO URETER Right 1/18/2022    Procedure: CYSTOURETEROSCOPY, WITH RETROGRADE PYELOGRAM AND URETERAL STENT INSERTION;  Surgeon: Damari Barber Jr., MD;  Location: [facility] OR;  Service: Urology;  Laterality: Right;    DIAGNOSTIC LAPAROSCOPY N/A 2/13/2020    Procedure:  LAPAROSCOPY, DIAGNOSTIC;  Surgeon: Robbi Lovell III, MD;  Location: [facility] OR;  Service: General;  Laterality: N/A;    HYSTERECTOMY  1980    ILEOSTOMY N/A 2/13/2020    Procedure: CREATION, ILEOSTOMY Loop;  Surgeon: Robbi Lovell III, MD;  Location: [facility] OR;  Service: General;  Laterality: N/A;    ILEOSTOMY CLOSURE N/A 5/18/2020    Procedure: CLOSURE, ILEOSTOMY;  Surgeon: Robbi Lovell III, MD;  Location: [facility] OR;  Service: General;  Laterality: N/A;    LASER LITHOTRIPSY Right 3/30/2021    Procedure: LITHOTRIPSY, USING LASER;  Surgeon: Damari Barber Jr., MD;  Location: [facility] OR;  Service: Urology;  Laterality: Right;    LUMBAR LAMINECTOMY      LYSIS OF ADHESIONS N/A 2/13/2020    Procedure: LYSIS, ADHESIONS;  Surgeon: Robbi Lovell III, MD;  Location: [facility] OR;  Service: General;  Laterality: N/A;    NECK SURGERY      PHLEBOGRAPHY  2/28/2020    Procedure: VENOGRAM;  Surgeon: Robbi Lovell III, MD;  Location: [facility] OR;  Service: General;;    REMOVAL OF VASCULAR ACCESS PORT Right 2/13/2020    Procedure: REMOVAL, VASCULAR ACCESS PORT;  Surgeon: Robbi Lovell III, MD;  Location: [facility] OR;  Service: General;  Laterality: Right;    RETROGRADE PYELOGRAPHY  3/16/2021    Procedure: PYELOGRAM, RETROGRADE;  Surgeon: Damari Barber Jr., MD;  Location: [facility] OR;  Service: Urology;;    RETROGRADE PYELOGRAPHY Right 5/18/2021    Procedure: PYELOGRAM, RETROGRADE;  Surgeon: Damari Barber Jr., MD;  Location: [facility] OR;  Service: Urology;  Laterality: Right;    SHOULDER SURGERY      URETEROSCOPIC REMOVAL OF URETERIC CALCULUS Right 3/30/2021    Procedure: REMOVAL, CALCULUS, URETER, URETEROSCOPIC;  Surgeon: Damari Barber Jr., MD;  Location: [facility] OR;  Service: Urology;  Laterality: Right;    URETEROSCOPIC REMOVAL OF URETERIC CALCULUS Right 4/20/2021    Procedure: REMOVAL, CALCULUS, URETER, URETEROSCOPIC;  Surgeon: Damari Barber Jr., MD;  Location: [facility] OR;  Service: Urology;  Laterality: Right;     vulva cancer       Family History   Problem Relation Age of Onset    Heart disease Mother     Hypertension Mother     Cancer Mother         lung    Heart disease Father     Heart disease Sister         Open heart surgery    No Known Problems Daughter      Social History     Tobacco Use    Smoking status: Former Smoker     Packs/day: 1.00     Years: 33.00     Pack years: 33.00     Types: Cigarettes     Quit date: 2000     Years since quittin.0    Smokeless tobacco: Former User     Quit date:    Substance Use Topics    Alcohol use: No    Drug use: No       Review of patient's allergies indicates:   Allergen Reactions    Ciprofloxacin Other (See Comments)     Elevated liver enzymes      Pcn [penicillins] Anaphylaxis     TOLERATES ROCEPHIN WITHOUT DIFFICULTY       Outpatient meds:  Current Facility-Administered Medications on File Prior to Encounter   Medication Dose Route Frequency Provider Last Rate Last Admin    electrolyte-S (ISOLYTE)   Intravenous Continuous Raz Ruff MD        lactated ringers infusion   Intravenous Continuous Ruy Beltre MD        LIDOcaine (PF) 10 mg/ml (1%) injection 5 mg  0.5 mL Intradermal Once Raz Ruff MD         Current Outpatient Medications on File Prior to Encounter   Medication Sig Dispense Refill    CHOLESTYRAMINE LIGHT 4 gram packet Take 1 packet by mouth 2 (two) times daily.      cyclobenzaprine (FLEXERIL) 10 MG tablet Take 1 tablet (10 mg total) by mouth 3 (three) times daily as needed for Muscle spasms. 30 tablet 1    diltiaZEM (CARDIZEM CD) 120 MG Cp24 Take 1 capsule (120 mg total) by mouth once daily. 90 capsule 0    diphenhydrAMINE (BENADRYL) 25 mg capsule Take 25 mg by mouth every 12 (twelve) hours as needed for Itching.      gabapentin (NEURONTIN) 100 MG capsule Take 100 mg by mouth nightly as needed. HCS      levothyroxine (SYNTHROID) 88 MCG tablet Take 1 tablet (88 mcg total) by mouth before breakfast. 90 tablet 0     promethazine (PHENERGAN) 25 MG tablet TAKE 1 TABLET(25 MG) BY MOUTH TWICE DAILY AS NEEDED FOR NAUSEA (Patient taking differently: Take 25 mg by mouth 2 (two) times daily as needed for Nausea.) 60 tablet 0    sertraline (ZOLOFT) 50 MG tablet Take 50 mg by mouth every evening.      doxycycline (VIBRAMYCIN) 100 MG Cap Take 100 mg by mouth every 12 (twelve) hours. HCS      ferric citrate (AURYXIA) 210 mg iron Tab Take 2 tablets by mouth 3 (three) times daily with meals.      traMADoL (ULTRAM) 50 mg tablet Take 1 tablet (50 mg total) by mouth every 8 (eight) hours as needed for Pain. 40 tablet 0       Scheduled meds:   cefTRIAXone (ROCEPHIN) IVPB  1 g Intravenous Q24H    cholestyramine-aspartame  1 packet Oral BID    diltiaZEM  120 mg Oral Daily    enoxaparin  30 mg Subcutaneous Daily    fluconazole  100 mg Oral QHS    levothyroxine  88 mcg Oral Before breakfast    miconazole   Topical (Top) BID    Questran and Aquaphor Topical Compound   Topical (Top) BID    sertraline  50 mg Oral QHS       Infusions:      PRN meds:  acetaminophen, cyclobenzaprine, diphenhydrAMINE, gabapentin, LIDOcaine-prilocaine, morphine, ondansetron, oxyCODONE-acetaminophen, sodium chloride 0.9%, sodium chloride 0.9%, vancomycin - pharmacy to dose    Review of Systems:  Review of Systems   Constitutional: Negative for chills, fever, malaise/fatigue and weight loss.   HENT: Negative for hearing loss and nosebleeds.    Eyes: Negative for blurred vision, double vision and photophobia.   Respiratory: Negative for cough, shortness of breath and wheezing.    Cardiovascular: Negative for chest pain, palpitations and leg swelling.   Gastrointestinal: Negative for abdominal pain, constipation, diarrhea, heartburn, nausea and vomiting.   Genitourinary: Negative for dysuria, frequency and urgency.   Musculoskeletal: Negative for falls, joint pain and myalgias.   Skin: Negative for itching and rash.   Neurological: Negative for dizziness, speech  change, focal weakness, loss of consciousness and headaches.   Endo/Heme/Allergies: Does not bruise/bleed easily.   Psychiatric/Behavioral: Negative for depression and substance abuse. The patient is not nervous/anxious.      OBJECTIVE:     Vital Signs and IO (Last 24H):  Temp:  [97.7 °F (36.5 °C)-98.4 °F (36.9 °C)]   Pulse:  [69-76]   Resp:  [16-20]   BP: (145-171)/(70-81)   SpO2:  [95 %-98 %]   I/O last 3 completed shifts:  In: 1400 [P.O.:1150; IV Piggyback:250]  Out: -     Wt Readings from Last 5 Encounters:   01/18/22 68.3 kg (150 lb 9.2 oz)   09/15/21 63 kg (139 lb)   05/24/21 63 kg (139 lb)   05/19/21 66.7 kg (147 lb 0.8 oz)   05/13/21 63.5 kg (139 lb 15.9 oz)     Physical Exam:  Physical Exam  Constitutional:       Appearance: She is well-developed. She is not diaphoretic.   HENT:      Head: Normocephalic and atraumatic.   Eyes:      General: No scleral icterus.     Pupils: Pupils are equal, round, and reactive to light.   Cardiovascular:      Rate and Rhythm: Normal rate and regular rhythm.   Pulmonary:      Effort: Pulmonary effort is normal. No respiratory distress.      Breath sounds: No stridor.   Abdominal:      General: There is no distension.      Palpations: Abdomen is soft.   Musculoskeletal:         General: No deformity. Normal range of motion.      Cervical back: Neck supple.   Skin:     General: Skin is warm and dry.      Findings: No erythema or rash.   Neurological:      Mental Status: She is alert and oriented to person, place, and time.      Cranial Nerves: No cranial nerve deficit.   Psychiatric:         Behavior: Behavior normal.       Body mass index is 23.24 kg/m².    Laboratory:  Recent Labs   Lab 01/21/22  0424 01/22/22  0426 01/23/22  0552    140 140   K 4.6 4.2 4.2    103 107   CO2 23 25 21*   BUN 35* 22 37*   CREATININE 5.8* 4.0* 4.7*   ESTGFRAFRICA 7.8* 12.3* 10.1*   EGFRNONAA 6.8* 10.6* 8.7*   GLU 76 82 85       Recent Labs   Lab 01/17/22  0438 01/17/22  0438  01/18/22  0412 01/18/22  0412 01/21/22  0424 01/22/22  0426 01/23/22  0552   CALCIUM 8.0*   < > 8.2*   < > 7.6* 8.5* 8.7   PHOS 6.4*  --  6.6*  --   --   --   --    MG 1.5*  --  1.9  --  1.8  --   --     < > = values in this interval not displayed.       Recent Labs   Lab 09/30/19  0820 11/15/19  0748   PTH, Intact 176.4 H 238.4 H       No results for input(s): POCTGLUCOSE in the last 168 hours.    Recent Labs   Lab 11/15/19  0748 01/22/20  0848 05/25/21  0847   Hemoglobin A1C Invalid A SEE COMMENT 4.6       Recent Labs   Lab 01/21/22  0424 01/22/22  0427 01/23/22  0552   WBC 5.83 5.73 5.68   HGB 8.9* 9.4* 8.9*   HCT 29.4* 30.3* 29.4*   * 121* 128*   * 120* 122*   MCHC 30.3* 31.0* 30.3*       No results for input(s): BILITOT, BILIDIR, PROT, ALBUMIN, ALKPHOS, ALT, AST in the last 168 hours.    Recent Labs   Lab 12/09/20  2323 12/09/20  2323 03/15/21  1533 05/17/21  1819 01/18/22  0331   Color, UA Yellow   < > Yellow Yellow Yellow   Appearance, UA Cloudy A   < > Cloudy A Hazy A Clear   pH, UA 6.0   < > 7.0 8.0 7.0   Specific [place], UA 1.020   < > 1.015 1.015 1.010   Protein, UA 1+ A   < > 2+ A 2+ A Trace A   Glucose, UA Negative   < > Negative Negative Negative   Ketones, UA Negative   < > Negative Negative Negative   Urobilinogen, UA Negative   < > Negative Negative Negative   Bilirubin (UA) Negative   < > Negative Negative Negative   Occult Blood UA 3+ A   < > 2+ A 2+ A 2+ A   Nitrite, UA Negative   < > Negative Negative Negative   RBC, UA 1   < > 21 H 7 H 10 H   WBC, UA >100 H   < > >100 H 80 H 50 H   Bacteria Negative  --  Negative Few A  --    Hyaline Casts, UA 10 A  --  40 A 0  --     < > = values in this interval not displayed.       Recent Labs   Lab 03/16/20  1551 12/08/20 2024   POC PH 7.402 7.370   POC PCO2 39.9 39.0   POC HCO3 24.9 22.5 L   POC PO2 58 LL 85   POC SATURATED O2 90 L 96   POC BE 0 -3   Sample ARTERIAL ARTERIAL       Microbiology Results (last 7 days)     Procedure Component  Value Units Date/Time    Blood culture #2 **CANNOT BE ORDERED STAT** [[id number]] Collected: 01/16/22 1130    Order Status: Completed Specimen: Blood from Peripheral, Antecubital, Right Updated: 01/21/22 1432     Blood Culture, Routine No growth after 5 days.    Blood culture #1 **CANNOT BE ORDERED STAT** [[id number]] Collected: 01/16/22 1115    Order Status: Completed Specimen: Blood from Peripheral, Antecubital, Right Updated: 01/21/22 1232     Blood Culture, Routine No growth after 5 days.    Urine culture [[id number]] Collected: 01/18/22 0331    Order Status: Completed Specimen: Urine Updated: 01/20/22 0928     Urine Culture, Routine No growth    Narrative:      Specimen Source->Urine        ASSESSMENT/PLAN:     ESRD on HD MWF via AVF  Continue current dialysis prescription.  Next HD per schedule.  Renal diet - low K, low phos.  No IVs or BP checks on access and/or non-dominant arm.  AVF very tenuous.  Very tough stick.  Vascular consulted for tunneled catheter     Anemia of CKD  Hgb and HCT are acceptable. Monitor.  Will provide ADAM and/or IV iron PRN.    MBD / Secondary HPT  Ca, phos, PTH and vitamin D levels are acceptable.   Phos binders, vitamin D analogues and calcimimetics as needed.    HTN  BP seem controlled.   Tolerate asymptomatic HTN up to -160.  Continue home meds.  Low sodium diet.    Thank you for allowing us to participate in the care of your patient!   We will follow the patient and provide recommendations as needed.    Patient care time was spent personally by me on the following activities:   · Obtaining a history.  · Examination of patient.  · Providing medical care at the patients bedside.  · Developing a treatment plan with patient or surrogate and bedside caregivers.  · Ordering and reviewing laboratory studies, radiographic studies, pulse oximetry.  · Ordering and performing treatments and interventions.  · Evaluation of patient's response to treatment.  · Discussions with  consultants while on the unit and immediately available to the patient.  · Re-evaluation of the patient's condition.  · Documentation in the medical record.     Raúl Dietz MD    [facility]  [address] [place] [address]  [place], [place] [address] [phone] - tel  [phone] - fax    1/23/2022

## 2022-01-23 NOTE — PROGRESS NOTES
[facility] Medicine  Progress Note    Patient Name: Shara Hutchins  MRN: [id number]  Patient Class: IP- Inpatient   Admission Date: 1/16/2022  Length of Stay: 7 days  Attending Physician: Ashwin Olson MD  Primary Care Provider: April Horton MD        Subjective:     Principal Problem:ESRD on dialysis        HPI:  Patient presents to the emergency department with vulvar cellulitis that is recurrent.  The patient states that about 1 week ago she developed itching, redness, increased pain to the vulva.  She has a pertinent past medical history of fall vol cancer status post radiation and has since had recurrent episodes of cellulitis in the area.  She reports applying different types of creams such as Neosporin, Reginald Butt paste, calseptin, Aquaphor, and others with no improvement and about 3 days ago the symptoms worsened.  Her history is also complicated by chronic diarrhea since being treated with radiation in which she has loose incontinent stools multiple times per day.     The patient's current problem of cellulitis begins at superior-most aspect of her vulva and extends across the entire perineum to between the gluteal folds. The cellulitis also extends into the groin bilaterally.  The patient's pain has progressed to severe, nonradiating, described as burning, itching and dull.  Unrelieved by any home analgesics or topical medications.  Associated with intermittent low grade fever (not measured at home), occasional bleeding from excoriations of the skin, and hardened swelling of the affected area.  Or she also has intermittent nausea she believes is associated with the severe pain.  No vomiting.  The patient has been evaluated and treated by Dr. Blanca Neal in the past.  The patient was treated with meropenem and vancomycin in the emergency department.  PCN allergy noted and the patient reported tolerating meropenem and the past.     Imaging obtained in the emergency department  "incidentally revealed an obstructive, right-sided nephrolithiasis by 6 mm kidney stone that is causing perinephric fat stranding/hydronephrosis..  She has a history of nephrolithiasis and a CT.  The patient did receive IV contrast in the emergency department for this imaging and has hemodialysis 3 times a week overseen by Dr. Dietz.  The patient denied flank pain, hematuria, increased urination or urgency.  She does have a history of anuria.      Overview/Hospital Course:  01/18  Assumed care. Chart reviewed. Patient seen at [facility]: after cystoscopy/stent. Feels "Good". Labs reviewed and noted below: no leukocytosis with stable macrocytic anemia; trivial hyponatremia with end stage renal dysfunction    01/19  Had stent re-do today, followed by [facility]. Only complaint is vaginitis is persisting. Feels "Good" and would like to be discharged when cleared by Renal and ID. Labs reviewed and noted below: no leukocytosis with stable macrocytic anemia. Continue current regimen.     01/20  Discussed with Wound Care: to make poultice with Aquaphor and Questran for vaginal topical application. Vaginal area continues to be source of great discomfort. For [facility] in AM. Lab holiday today. Ordered for AM before RRT    01/21  Discussed with Vascular and Renal: may need to allow fistula to rest--[facility]RT RN had difficult time cannulating fistula--but it is functioning properly. Vaginitis has improved with current therapy. Discussed with : to order Home Health at discharge for poultice application and vaginitis follow-up after discharge. Will monitor over weekend and see how difficult fistula is at [facility] on 01/224. Plan: continue current regimen; lab holiday in AM.    01/22  Discussed again with Vascular and Renal: will have catheter placed to give fistula some rest time. Patient feels "OK". Vaginal area is less painful with current regimen. Lab holiday today. Patient request cardiac monitoring to be discontinued--ordered.    01/23  " "Resting today. No new complaints. Vaginal topically feels "Better" after poultice application. Waiting for dialysis catheter (see 01/22 note); expect next 24-48 hrs to place--may need to use fistula in AM. Lbs reviewed and noted below: no leukocytosis with stable macrocytic anemia; electrolytes normal with end stage renal dysfunction.  Plan: continue current course/regimen; have dialysis catheter placed (see above); AM labs for review      Interval History: for dialysis catheter    Review of Systems   Constitutional: Positive for fatigue.   HENT: Negative.    Eyes: Negative.    Respiratory: Negative.    Cardiovascular: Negative.    Gastrointestinal: Negative.    Endocrine: Negative.    Genitourinary: Negative.    Musculoskeletal: Negative.    Skin: Negative.    Allergic/Immunologic: Negative.    Neurological: Negative.    Hematological: Negative.    All other systems reviewed and are negative.    Objective:     Vital Signs (Most Recent):  Temp: 97.7 °F (36.5 °C) (01/23/22 0759)  Pulse: 73 (01/23/22 0759)  Resp: 18 (01/23/22 0759)  BP: (!) 152/78 (01/23/22 0759)  SpO2: 97 % (01/23/22 0759) Vital Signs (24h Range):  Temp:  [97.7 °F (36.5 °C)-98.4 °F (36.9 °C)] 97.7 °F (36.5 °C)  Pulse:  [69-85] 73  Resp:  [16-20] 18  SpO2:  [95 %-98 %] 97 %  BP: (145-170)/(70-78) 152/78     Weight: 68.3 kg (150 lb 9.2 oz)  Body mass index is 23.24 kg/m².    Intake/Output Summary (Last 24 hours) at 1/23/2022 1200  Last data filed at 1/23/2022 0947  Gross per 24 hour   Intake 810 ml   Output --   Net 810 ml      Physical Exam  Vitals and nursing note reviewed.   Constitutional:       Appearance: She is well-developed and well-nourished.   HENT:      Head: Normocephalic and atraumatic.      Right Ear: External ear normal.      Left Ear: External ear normal.      Nose: Nose normal.      Mouth/Throat:      Mouth: Oropharynx is clear and moist.   Eyes:      Extraocular Movements: EOM normal.      Conjunctiva/sclera: Conjunctivae normal.     "  Pupils: Pupils are equal, round, and reactive to light.   Cardiovascular:      Rate and Rhythm: Normal rate and regular rhythm.      Pulses: Intact distal pulses.      Heart sounds: Normal heart sounds.      Comments: RUE: good thrill  Pulmonary:      Effort: Pulmonary effort is normal.      Breath sounds: Normal breath sounds.   Abdominal:      General: Bowel sounds are normal.      Palpations: Abdomen is soft.   Musculoskeletal:         General: Normal range of motion.      Cervical back: Normal range of motion and neck supple.   Skin:     General: Skin is warm and dry.      Capillary Refill: Capillary refill takes less than 2 seconds.      Comments: RUE: large ecchymosis beginning to yellow   Neurological:      Mental Status: She is alert and oriented to person, place, and time.   Psychiatric:         Mood and Affect: Mood and affect normal.         Behavior: Behavior normal.         Thought Content: Thought content normal.         Judgment: Judgment normal.         Significant Labs:   All pertinent labs within the past 24 hours have been reviewed.  BMP:   Recent Labs   Lab 01/23/22  0552   GLU 85      K 4.2      CO2 21*   BUN 37*   CREATININE 4.7*   CALCIUM 8.7     CBC:   Recent Labs   Lab 01/22/22  0427 01/23/22  0552   WBC 5.73 5.68   HGB 9.4* 8.9*   HCT 30.3* 29.4*   * 128*       Significant Imaging: I have reviewed all pertinent imaging results/findings within the past 24 hours.      Assessment/Plan:      No notes have been filed under this hospital service.  Service: Hospital Medicine    VTE Risk Mitigation (From admission, onward)         Ordered     enoxaparin injection 30 mg  Daily         01/16/22 1502     IP VTE HIGH RISK PATIENT  Once         01/16/22 1502     Place sequential compression device  Until discontinued         01/16/22 1502                Discharge Planning   RODRIGO: 1/23/2022     Code Status: Full Code   Is the patient medically ready for discharge?:     Reason for  patient still in hospital (select all that apply): Patient trending condition, Treatment and Consult recommendations  Discharge Plan A: Assisted Living   Discharge Delays: None known at this time              Ashwin Olson MD  Department of Hospital Medicine   [facility]

## 2022-01-23 NOTE — SUBJECTIVE & OBJECTIVE
Interval History: for dialysis catheter    Review of Systems   Constitutional: Positive for fatigue.   HENT: Negative.    Eyes: Negative.    Respiratory: Negative.    Cardiovascular: Negative.    Gastrointestinal: Negative.    Endocrine: Negative.    Genitourinary: Negative.    Musculoskeletal: Negative.    Skin: Negative.    Allergic/Immunologic: Negative.    Neurological: Negative.    Hematological: Negative.    All other systems reviewed and are negative.    Objective:     Vital Signs (Most Recent):  Temp: 97.7 °F (36.5 °C) (01/23/22 0759)  Pulse: 73 (01/23/22 0759)  Resp: 18 (01/23/22 0759)  BP: (!) 152/78 (01/23/22 0759)  SpO2: 97 % (01/23/22 0759) Vital Signs (24h Range):  Temp:  [97.7 °F (36.5 °C)-98.4 °F (36.9 °C)] 97.7 °F (36.5 °C)  Pulse:  [69-85] 73  Resp:  [16-20] 18  SpO2:  [95 %-98 %] 97 %  BP: (145-170)/(70-78) 152/78     Weight: 68.3 kg (150 lb 9.2 oz)  Body mass index is 23.24 kg/m².    Intake/Output Summary (Last 24 hours) at 1/23/2022 1200  Last data filed at 1/23/2022 0947  Gross per 24 hour   Intake 810 ml   Output --   Net 810 ml      Physical Exam  Vitals and nursing note reviewed.   Constitutional:       Appearance: She is well-developed and well-nourished.   HENT:      Head: Normocephalic and atraumatic.      Right Ear: External ear normal.      Left Ear: External ear normal.      Nose: Nose normal.      Mouth/Throat:      Mouth: Oropharynx is clear and moist.   Eyes:      Extraocular Movements: EOM normal.      Conjunctiva/sclera: Conjunctivae normal.      Pupils: Pupils are equal, round, and reactive to light.   Cardiovascular:      Rate and Rhythm: Normal rate and regular rhythm.      Pulses: Intact distal pulses.      Heart sounds: Normal heart sounds.      Comments: RUE: good thrill  Pulmonary:      Effort: Pulmonary effort is normal.      Breath sounds: Normal breath sounds.   Abdominal:      General: Bowel sounds are normal.      Palpations: Abdomen is soft.   Musculoskeletal:          General: Normal range of motion.      Cervical back: Normal range of motion and neck supple.   Skin:     General: Skin is warm and dry.      Capillary Refill: Capillary refill takes less than 2 seconds.      Comments: RUE: large ecchymosis beginning to yellow   Neurological:      Mental Status: She is alert and oriented to person, place, and time.   Psychiatric:         Mood and Affect: Mood and affect normal.         Behavior: Behavior normal.         Thought Content: Thought content normal.         Judgment: Judgment normal.         Significant Labs:   All pertinent labs within the past 24 hours have been reviewed.  BMP:   Recent Labs   Lab 01/23/22  0552   GLU 85      K 4.2      CO2 21*   BUN 37*   CREATININE 4.7*   CALCIUM 8.7     CBC:   Recent Labs   Lab 01/22/22  0427 01/23/22  0552   WBC 5.73 5.68   HGB 9.4* 8.9*   HCT 30.3* 29.4*   * 128*       Significant Imaging: I have reviewed all pertinent imaging results/findings within the past 24 hours.

## 2022-01-24 NOTE — PLAN OF CARE
Important Message from Medicare was sign, explained and given to patient/caregiver on 01/24/2022 at 8:51am     addressed any questions or concerns.    Important Message from Medicare document will be scanned into patient's medical record

## 2022-01-24 NOTE — NURSING
Contacted Dr Boyd office spoke with Minerva Velasco and she stated she will call back and after consult one of the other physicians in there office.

## 2022-01-24 NOTE — CONSULTS
Nephrology Consult Note        Patient Name: Shara Hutchins  MRN: [id number]    Patient Class: IP- Inpatient   Admission Date: 1/16/2022  Length of Stay: 8 days  Date of Service: 1/24/2022    Attending Physician: Ashwin Olson MD  Primary Care Provider: April Horton MD    Reason for Consult: esrd/anemia/htn/shpt/vulvar cellulitis    SUBJECTIVE:     HPI: ESRD patient on HD MWF presents to the emergency department with recurrent vulvar cellulitis, complicated by chronic diarrhea since being treated with radiation. Unrelieved by any home analgesics or topical medications. Associated with intermittent low grade fever, occasional bleeding from excoriations of the skin, and hardened swelling of the affected area. She has intermittent nausea associated with the severe pain.     Imaging obtained in the emergency department incidentally revealed an obstructive, right-sided nephrolithiasis by 6 mm kidney stone causing perinephric fat stranding/hydronephrosis. The patient denied flank pain, hematuria, increased urination or urgency.    1/17  AVF non-functioning.  Consult Vascular surgery for trialysis catheter and avf evaluation  1/18  Good avf flow on ultrasound.  At a procedure  1/19  Seen on dialysis.  No cp or sob.  AVF sore  1/20  Sleeping.  No distress.  1/21  AFVSS.     No acute events   Seen on dialysis.  Some pain in vaginal area  1/22  AFVSS.  sleeping   1/24  Awaiting hemosplit placement.  Got stent to AVF, but still a very tough stick.  LUE very bruised and irritated from infiltration and procedure.  Will have dialysis after line placed, likely home tomorrow.    Past Medical History:   Diagnosis Date    Cellulitis of trunk     Perineum, numerous episodes    Chronic diarrhea 02/10/2020    Short-gut syndrome    Diabetes mellitus, type 2     pt has not had for years after losing weight    End stage renal disease on dialysis 01/07/2018    Fatigue     Hypertension     Hypothyroidism 2/10/2020    Iron  deficiency anemia due to chronic blood loss 1/7/2018    Pulmonary hypertension 3/20/2020    Vulvar cancer 1/7/2019     Past Surgical History:   Procedure Laterality Date    ABDOMINAL SURGERY      ANGIOGRAPHY OF ARTERIOVENOUS SHUNT N/A 1/19/2022    Procedure: Fistulogram with Possible Intervention;  Surgeon: Raz Maher MD;  Location: [facility] CATH/EP LAB;  Service: Cardiothoracic;  Laterality: N/A;    APPENDECTOMY      AV FISTULA PLACEMENT Left 2018    BACK SURGERY      BLADDER FULGURATION N/A 6/16/2020    Procedure: FULGURATION, BLADDER;  Surgeon: Damari Barber Jr., MD;  Location: [facility] OR;  Service: Urology;  Laterality: N/A;    carpel tunnel surgery once on each hand      CERVICAL FUSION      x 4    cervix repair      CHOLECYSTECTOMY  2000    COLECTOMY      CYSTOSCOPY W/ RETROGRADES  6/16/2020    Procedure: CYSTOSCOPY, WITH RETROGRADE PYELOGRAM;  Surgeon: Damari Barber Jr., MD;  Location: [facility] OR;  Service: Urology;;    CYSTOSCOPY W/ RETROGRADES Right 4/20/2021    Procedure: CYSTOSCOPY, WITH RETROGRADE PYELOGRAM;  Surgeon: Damari Barber Jr., MD;  Location: [facility] OR;  Service: Urology;  Laterality: Right;    CYSTOSCOPY W/ URETERAL STENT PLACEMENT Right 3/16/2021    Procedure: CYSTOSCOPY, WITH URETERAL STENT INSERTION;  Surgeon: Damari Barber Jr., MD;  Location: [facility] OR;  Service: Urology;  Laterality: Right;    CYSTOSCOPY W/ URETERAL STENT PLACEMENT Right 5/18/2021    Procedure: CYSTOSCOPY, WITH URETERAL STENT INSERTION;  Surgeon: Damari Barber Jr., MD;  Location: [facility] OR;  Service: Urology;  Laterality: Right;    CYSTOSCOPY W/ URETERAL STENT REMOVAL Right 4/20/2021    Procedure: CYSTOSCOPY, WITH URETERAL STENT REMOVAL;  Surgeon: Damari Barber Jr., MD;  Location: [facility] OR;  Service: Urology;  Laterality: Right;    CYSTOSCOPY WITH URETEROSCOPY, RETROGRADE PYELOGRAPHY, AND INSERTION OF STENT Right 3/30/2021    Procedure: CYSTOSCOPY, WITH RETROGRADE PYELOGRAM AND URETERAL STENT INSERTION;   Surgeon: Damari Barber Jr., MD;  Location: [facility] OR;  Service: Urology;  Laterality: Right;    CYSTOURETEROSCOPY WITH RETROGRADE PYELOGRAPHY AND INSERTION OF STENT INTO URETER Right 1/18/2022    Procedure: CYSTOURETEROSCOPY, WITH RETROGRADE PYELOGRAM AND URETERAL STENT INSERTION;  Surgeon: Damari Barber Jr., MD;  Location: [facility] OR;  Service: Urology;  Laterality: Right;    DIAGNOSTIC LAPAROSCOPY N/A 2/13/2020    Procedure: LAPAROSCOPY, DIAGNOSTIC;  Surgeon: Robbi Lovell III, MD;  Location: [facility] OR;  Service: General;  Laterality: N/A;    HYSTERECTOMY  1980    ILEOSTOMY N/A 2/13/2020    Procedure: CREATION, ILEOSTOMY Loop;  Surgeon: Robbi Lovell III, MD;  Location: [facility] OR;  Service: General;  Laterality: N/A;    ILEOSTOMY CLOSURE N/A 5/18/2020    Procedure: CLOSURE, ILEOSTOMY;  Surgeon: Robbi Lovell III, MD;  Location: [facility] OR;  Service: General;  Laterality: N/A;    LASER LITHOTRIPSY Right 3/30/2021    Procedure: LITHOTRIPSY, USING LASER;  Surgeon: Damari Barber Jr., MD;  Location: [facility] OR;  Service: Urology;  Laterality: Right;    LUMBAR LAMINECTOMY      LYSIS OF ADHESIONS N/A 2/13/2020    Procedure: LYSIS, ADHESIONS;  Surgeon: Robbi Lovell III, MD;  Location: [facility] OR;  Service: General;  Laterality: N/A;    NECK SURGERY      PERCUTANEOUS TRANSLUMINAL ANGIOPLASTY OF ARTERIOVENOUS FISTULA N/A 1/19/2022    Procedure: PTA, AV FISTULA;  Surgeon: Raz Maher MD;  Location: [facility] CATH/EP LAB;  Service: Cardiothoracic;  Laterality: N/A;    PHLEBOGRAPHY  2/28/2020    Procedure: VENOGRAM;  Surgeon: Robbi Lovell III, MD;  Location: [facility] OR;  Service: General;;    REMOVAL OF VASCULAR ACCESS PORT Right 2/13/2020    Procedure: REMOVAL, VASCULAR ACCESS PORT;  Surgeon: Robbi Lovell III, MD;  Location: [facility] OR;  Service: General;  Laterality: Right;    RETROGRADE PYELOGRAPHY  3/16/2021    Procedure: PYELOGRAM, RETROGRADE;  Surgeon: Damari Barber Jr., MD;  Location: [facility];   Service: Urology;;    RETROGRADE PYELOGRAPHY Right 2021    Procedure: PYELOGRAM, RETROGRADE;  Surgeon: Damari Barber Jr., MD;  Location: [facility] OR;  Service: Urology;  Laterality: Right;    SHOULDER SURGERY      URETEROSCOPIC REMOVAL OF URETERIC CALCULUS Right 3/30/2021    Procedure: REMOVAL, CALCULUS, URETER, URETEROSCOPIC;  Surgeon: Damari Barber Jr., MD;  Location: [facility] OR;  Service: Urology;  Laterality: Right;    URETEROSCOPIC REMOVAL OF URETERIC CALCULUS Right 2021    Procedure: REMOVAL, CALCULUS, URETER, URETEROSCOPIC;  Surgeon: Damari Barber Jr., MD;  Location: [facility] OR;  Service: Urology;  Laterality: Right;    vulva cancer       Family History   Problem Relation Age of Onset    Heart disease Mother     Hypertension Mother     Cancer Mother         lung    Heart disease Father     Heart disease Sister         Open heart surgery    No Known Problems Daughter      Social History     Tobacco Use    Smoking status: Former Smoker     Packs/day: 1.00     Years: 33.00     Pack years: 33.00     Types: Cigarettes     Quit date: 2000     Years since quittin.0    Smokeless tobacco: Former User     Quit date:    Substance Use Topics    Alcohol use: No    Drug use: No       Review of patient's allergies indicates:   Allergen Reactions    Ciprofloxacin Other (See Comments)     Elevated liver enzymes      Pcn [penicillins] Anaphylaxis     TOLERATES ROCEPHIN WITHOUT DIFFICULTY       Outpatient meds:  Current Facility-Administered Medications on File Prior to Encounter   Medication Dose Route Frequency Provider Last Rate Last Admin    electrolyte-S (ISOLYTE)   Intravenous Continuous Raz Ruff MD        lactated ringers infusion   Intravenous Continuous Ruy Beltre MD        LIDOcaine (PF) 10 mg/ml (1%) injection 5 mg  0.5 mL Intradermal Once Raz Ruff MD         Current Outpatient Medications on File Prior to Encounter   Medication Sig Dispense Refill     CHOLESTYRAMINE LIGHT 4 gram packet Take 1 packet by mouth 2 (two) times daily.      cyclobenzaprine (FLEXERIL) 10 MG tablet Take 1 tablet (10 mg total) by mouth 3 (three) times daily as needed for Muscle spasms. 30 tablet 1    diltiaZEM (CARDIZEM CD) 120 MG Cp24 Take 1 capsule (120 mg total) by mouth once daily. 90 capsule 0    diphenhydrAMINE (BENADRYL) 25 mg capsule Take 25 mg by mouth every 12 (twelve) hours as needed for Itching.      gabapentin (NEURONTIN) 100 MG capsule Take 100 mg by mouth nightly as needed. HCS      levothyroxine (SYNTHROID) 88 MCG tablet Take 1 tablet (88 mcg total) by mouth before breakfast. 90 tablet 0    promethazine (PHENERGAN) 25 MG tablet TAKE 1 TABLET(25 MG) BY MOUTH TWICE DAILY AS NEEDED FOR NAUSEA (Patient taking differently: Take 25 mg by mouth 2 (two) times daily as needed for Nausea.) 60 tablet 0    sertraline (ZOLOFT) 50 MG tablet Take 50 mg by mouth every evening.      doxycycline (VIBRAMYCIN) 100 MG Cap Take 100 mg by mouth every 12 (twelve) hours. HCS      ferric citrate (AURYXIA) 210 mg iron Tab Take 2 tablets by mouth 3 (three) times daily with meals.      traMADoL (ULTRAM) 50 mg tablet Take 1 tablet (50 mg total) by mouth every 8 (eight) hours as needed for Pain. 40 tablet 0       Scheduled meds:   cefTRIAXone (ROCEPHIN) IVPB  1 g Intravenous Q24H    cholestyramine-aspartame  1 packet Oral BID    diltiaZEM  120 mg Oral Daily    enoxaparin  30 mg Subcutaneous Daily    fluconazole  100 mg Oral QHS    levothyroxine  88 mcg Oral Before breakfast    miconazole   Topical (Top) BID    Questran and Aquaphor Topical Compound   Topical (Top) BID    sertraline  50 mg Oral QHS       Infusions:      PRN meds:  acetaminophen, cyclobenzaprine, diphenhydrAMINE, gabapentin, LIDOcaine-prilocaine, morphine, oxyCODONE-acetaminophen, promethazine (PHENERGAN) IVPB, sodium chloride 0.9%, sodium chloride 0.9%, vancomycin - pharmacy to dose    Review of Systems:  Review of  Systems   Constitutional: Negative for chills, fever, malaise/fatigue and weight loss.   HENT: Negative for hearing loss and nosebleeds.    Eyes: Negative for blurred vision, double vision and photophobia.   Respiratory: Negative for cough, shortness of breath and wheezing.    Cardiovascular: Negative for chest pain, palpitations and leg swelling.   Gastrointestinal: Negative for abdominal pain, constipation, diarrhea, heartburn, nausea and vomiting.   Genitourinary: Negative for dysuria, frequency and urgency.   Musculoskeletal: Negative for falls, joint pain and myalgias.   Skin: Negative for itching and rash.   Neurological: Negative for dizziness, speech change, focal weakness, loss of consciousness and headaches.   Endo/Heme/Allergies: Does not bruise/bleed easily.   Psychiatric/Behavioral: Negative for depression and substance abuse. The patient is not nervous/anxious.      OBJECTIVE:     Vital Signs and IO (Last 24H):  Temp:  [97.7 °F (36.5 °C)-98.1 °F (36.7 °C)]   Pulse:  [57-76]   Resp:  [16-18]   BP: (152-193)/(76-92)   SpO2:  [95 %-98 %]   I/O last 3 completed shifts:  In: 1910 [P.O.:1810; IV Piggyback:100]  Out: -     Wt Readings from Last 5 Encounters:   01/18/22 68.3 kg (150 lb 9.2 oz)   09/15/21 63 kg (139 lb)   05/24/21 63 kg (139 lb)   05/19/21 66.7 kg (147 lb 0.8 oz)   05/13/21 63.5 kg (139 lb 15.9 oz)     Physical Exam:  Physical Exam  Constitutional:       Appearance: She is well-developed. She is not diaphoretic.   HENT:      Head: Normocephalic and atraumatic.   Eyes:      General: No scleral icterus.     Pupils: Pupils are equal, round, and reactive to light.   Cardiovascular:      Rate and Rhythm: Normal rate and regular rhythm.   Pulmonary:      Effort: Pulmonary effort is normal. No respiratory distress.      Breath sounds: No stridor.   Abdominal:      General: There is no distension.      Palpations: Abdomen is soft.   Musculoskeletal:         General: No deformity. Normal range of  motion.      Cervical back: Neck supple.   Skin:     General: Skin is warm and dry.      Findings: No erythema or rash.   Neurological:      Mental Status: She is alert and oriented to person, place, and time.      Cranial Nerves: No cranial nerve deficit.   Psychiatric:         Behavior: Behavior normal.       Body mass index is 23.24 kg/m².    Laboratory:  Recent Labs   Lab 01/22/22  0426 01/23/22  0552 01/24/22  0856    140 141   K 4.2 4.2 4.3    107 109   CO2 25 21* 19*   BUN 22 37* 43*   CREATININE 4.0* 4.7* 5.2*   ESTGFRAFRICA 12.3* 10.1* 8.9*   EGFRNONAA 10.6* 8.7* 7.7*   GLU 82 85 81       Recent Labs   Lab 01/18/22  0412 01/18/22  0412 01/21/22  0424 01/21/22  0424 01/22/22  0426 01/23/22  0552 01/24/22  0856   CALCIUM 8.2*   < > 7.6*   < > 8.5* 8.7 8.7   PHOS 6.6*  --   --   --   --   --   --    MG 1.9  --  1.8  --   --   --   --     < > = values in this interval not displayed.       Recent Labs   Lab 09/30/19  0820 11/15/19  0748   PTH, Intact 176.4 H 238.4 H       No results for input(s): POCTGLUCOSE in the last 168 hours.    Recent Labs   Lab 11/15/19  0748 01/22/20  0848 05/25/21  0847   Hemoglobin A1C Invalid A SEE COMMENT 4.6       Recent Labs   Lab 01/21/22  0424 01/22/22  0427 01/23/22  0552   WBC 5.83 5.73 5.68   HGB 8.9* 9.4* 8.9*   HCT 29.4* 30.3* 29.4*   * 121* 128*   * 120* 122*   MCHC 30.3* 31.0* 30.3*       No results for input(s): BILITOT, BILIDIR, PROT, ALBUMIN, ALKPHOS, ALT, AST in the last 168 hours.    Recent Labs   Lab 12/09/20  2323 12/09/20  2323 03/15/21  1533 05/17/21  1819 01/18/22  0331   Color, UA Yellow   < > Yellow Yellow Yellow   Appearance, UA Cloudy A   < > Cloudy A Hazy A Clear   pH, UA 6.0   < > 7.0 8.0 7.0   Specific [place], UA 1.020   < > 1.015 1.015 1.010   Protein, UA 1+ A   < > 2+ A 2+ A Trace A   Glucose, UA Negative   < > Negative Negative Negative   Ketones, UA Negative   < > Negative Negative Negative   Urobilinogen, UA Negative   < >  Negative Negative Negative   Bilirubin (UA) Negative   < > Negative Negative Negative   Occult Blood UA 3+ A   < > 2+ A 2+ A 2+ A   Nitrite, UA Negative   < > Negative Negative Negative   RBC, UA 1   < > 21 H 7 H 10 H   WBC, UA >100 H   < > >100 H 80 H 50 H   Bacteria Negative  --  Negative Few A  --    Hyaline Casts, UA 10 A  --  40 A 0  --     < > = values in this interval not displayed.       Recent Labs   Lab 03/16/20  1551 12/08/20 2024   POC PH 7.402 7.370   POC PCO2 39.9 39.0   POC HCO3 24.9 22.5 L   POC PO2 58 LL 85   POC SATURATED O2 90 L 96   POC BE 0 -3   Sample ARTERIAL ARTERIAL       Microbiology Results (last 7 days)     Procedure Component Value Units Date/Time    Blood culture #2 **CANNOT BE ORDERED STAT** [[id number]] Collected: 01/16/22 1130    Order Status: Completed Specimen: Blood from Peripheral, Antecubital, Right Updated: 01/21/22 1432     Blood Culture, Routine No growth after 5 days.    Blood culture #1 **CANNOT BE ORDERED STAT** [[id number]] Collected: 01/16/22 1115    Order Status: Completed Specimen: Blood from Peripheral, Antecubital, Right Updated: 01/21/22 1232     Blood Culture, Routine No growth after 5 days.    Urine culture [[id number]] Collected: 01/18/22 0331    Order Status: Completed Specimen: Urine Updated: 01/20/22 0928     Urine Culture, Routine No growth    Narrative:      Specimen Source->Urine        ASSESSMENT/PLAN:     ESRD on HD MWF via AVF  Continue current dialysis prescription.  Next HD per schedule.  Renal diet - low K, low phos.  No IVs or BP checks on access and/or non-dominant arm.  AVF very tenuous.  Very tough stick.  Vascular consulted for tunneled catheter     Anemia of CKD  Hgb and HCT are acceptable. Monitor.  Will provide ADAM and/or IV iron PRN.    MBD / Secondary HPT  Ca, phos, PTH and vitamin D levels are acceptable.   Phos binders, vitamin D analogues and calcimimetics as needed.    HTN  BP seem controlled.   Tolerate asymptomatic HTN up to SBP  150-160.  Continue home meds.  Low sodium diet.    Thank you for allowing us to participate in the care of your patient!   We will follow the patient and provide recommendations as needed.    Patient care time was spent personally by me on the following activities:   · Obtaining a history.  · Examination of patient.  · Providing medical care at the patients bedside.  · Developing a treatment plan with patient or surrogate and bedside caregivers.  · Ordering and reviewing laboratory studies, radiographic studies, pulse oximetry.  · Ordering and performing treatments and interventions.  · Evaluation of patient's response to treatment.  · Discussions with consultants while on the unit and immediately available to the patient.  · Re-evaluation of the patient's condition.  · Documentation in the medical record.     Elo Baker NP      [facility]  [address] [place] [address]  [place] [id number] [phone] - tel  [phone] - fax    1/24/2022

## 2022-01-24 NOTE — SUBJECTIVE & OBJECTIVE
Interval History: improving--hopefully discharge tomorrow    Review of Systems   Constitutional: Positive for fatigue.   HENT: Negative.    Eyes: Negative.    Respiratory: Negative.    Cardiovascular: Negative.    Gastrointestinal: Negative.    Endocrine: Negative.    Genitourinary: Negative.    Musculoskeletal: Negative.    Skin: Negative.    Allergic/Immunologic: Negative.    Neurological: Negative.    Hematological: Negative.    All other systems reviewed and are negative.    Objective:     Vital Signs (Most Recent):  Temp: 97.9 °F (36.6 °C) (01/24/22 0819)  Pulse: 64 (01/24/22 0819)  Resp: 18 (01/24/22 0819)  BP: (!) 193/90 (01/24/22 0819)  SpO2: 95 % (01/24/22 0819) Vital Signs (24h Range):  Temp:  [97.7 °F (36.5 °C)-98.1 °F (36.7 °C)] 97.9 °F (36.6 °C)  Pulse:  [57-76] 64  Resp:  [16-18] 18  SpO2:  [95 %-98 %] 95 %  BP: (152-193)/(76-92) 193/90     Weight: 68.3 kg (150 lb 9.2 oz)  Body mass index is 23.24 kg/m².    Intake/Output Summary (Last 24 hours) at 1/24/2022 0847  Last data filed at 1/24/2022 0500  Gross per 24 hour   Intake 1460 ml   Output --   Net 1460 ml      Physical Exam  Vitals and nursing note reviewed.   Constitutional:       Appearance: She is well-developed and well-nourished.   HENT:      Head: Normocephalic and atraumatic.      Right Ear: External ear normal.      Left Ear: External ear normal.      Nose: Nose normal.      Mouth/Throat:      Mouth: Oropharynx is clear and moist.   Eyes:      Extraocular Movements: EOM normal.      Conjunctiva/sclera: Conjunctivae normal.      Pupils: Pupils are equal, round, and reactive to light.   Cardiovascular:      Rate and Rhythm: Normal rate and regular rhythm.      Pulses: Intact distal pulses.      Heart sounds: Normal heart sounds.      Comments: Good thrill LUE  Pulmonary:      Effort: Pulmonary effort is normal.      Breath sounds: Normal breath sounds.   Abdominal:      General: Bowel sounds are normal.      Palpations: Abdomen is soft.    Musculoskeletal:         General: Normal range of motion.      Cervical back: Normal range of motion and neck supple.   Skin:     General: Skin is warm and dry.      Capillary Refill: Capillary refill takes less than 2 seconds.      Comments: RENEE: aging ecchymoses   Neurological:      Mental Status: She is alert and oriented to person, place, and time.   Psychiatric:         Mood and Affect: Mood and affect normal.         Behavior: Behavior normal.         Thought Content: Thought content normal.         Judgment: Judgment normal.         Significant Labs:   All pertinent labs within the past 24 hours have been reviewed.  BMP:   Recent Labs   Lab 01/23/22  0552   GLU 85      K 4.2      CO2 21*   BUN 37*   CREATININE 4.7*   CALCIUM 8.7     CBC:   Recent Labs   Lab 01/23/22  0552   WBC 5.68   HGB 8.9*   HCT 29.4*   *       Significant Imaging: I have reviewed all pertinent imaging results/findings within the past 24 hours.

## 2022-01-25 NOTE — TRANSFER OF CARE
"Anesthesia Transfer of Care Note    Patient: Shara Hutchins    Procedure(s) Performed: Procedure(s) (LRB):  Insertion,catheter,tunneled (N/A)    Patient location: PACU    Anesthesia Type: general    Transport from OR: Transported from OR on room air with adequate spontaneous ventilation    Post pain: adequate analgesia    Post assessment: no apparent anesthetic complications    Post vital signs: stable    Level of consciousness: awake and alert    Nausea/Vomiting: no nausea/vomiting    Complications: none    Transfer of care protocol was followed      Last vitals:   Visit Vitals  BP (!) 152/86   Pulse 69   Temp 36.8 °C (98.2 °F) (Oral)   Resp 17   Ht 5' 7.5" (1.715 m)   Wt 68.3 kg (150 lb 9.2 oz)   SpO2 96%   BMI 23.24 kg/m²     "

## 2022-01-25 NOTE — PROGRESS NOTES
Nephrology Progress Note        Patient Name: Shara Hutchins  MRN: [id number]    Patient Class: IP- Inpatient   Admission Date: 1/16/2022  Length of Stay: 9 days  Date of Service: 1/25/2022    Attending Physician: Shemar Hernandez MD  Primary Care Provider: April Horton MD    Reason for Consult: esrd/anemia/htn/shpt/vulvar cellulitis    SUBJECTIVE:     HPI: ESRD patient on HD MWF presents to the emergency department with recurrent vulvar cellulitis, complicated by chronic diarrhea since being treated with radiation. Unrelieved by any home analgesics or topical medications. Associated with intermittent low grade fever, occasional bleeding from excoriations of the skin, and hardened swelling of the affected area. She has intermittent nausea associated with the severe pain.     Imaging obtained in the emergency department incidentally revealed an obstructive, right-sided nephrolithiasis by 6 mm kidney stone causing perinephric fat stranding/hydronephrosis. The patient denied flank pain, hematuria, increased urination or urgency.    1/17  AVF non-functioning.  Consult Vascular surgery for trialysis catheter and avf evaluation  1/18  Good avf flow on ultrasound.  At a procedure  1/19  Seen on dialysis.  No cp or sob.  AVF sore  1/20  Sleeping.  No distress.  1/21  AFVSS.     No acute events   Seen on dialysis.  Some pain in vaginal area  1/22  AFVSS.  sleeping   1/24  Awaiting hemosplit placement.  Got stent to AVF, but still a very tough stick.  LUE very bruised and irritated from infiltration and procedure.  Will have dialysis after line placed, likely home tomorrow.  1/25 off floor to get TDC    Outpatient meds:  Current Facility-Administered Medications on File Prior to Encounter   Medication Dose Route Frequency Provider Last Rate Last Admin    electrolyte-S (ISOLYTE)   Intravenous Continuous Raz Ruff MD        lactated ringers infusion   Intravenous Continuous Ruy Beltre MD        LIDOcaine (PF)  Subjective:  
Shira Montana is a 80 y.o. male Chief Complaint Patient presents with  Follow-up 2 weeks History of present illness:  Mr. Kleber Gomes returns in follow up. He partially avulsed his left great toenail his last visit here because of an ingrown toenail. This is healing well. There was some residual tissue which was removed today. I've instructed his wife in further wound care and how to allow for the nail to grow out correctly. He's also due for follow up regarding his renal function. He says he's drinking a bit more fluids, but we'll have to see where he stands. If everything is stable today he can follow up in three months' time regarding his diabetes, lipids, etc.  He denies new CR, GI or  symptoms at the present time. Overall he seems to be doing well. Patient Active Problem List  
 Diagnosis Date Noted  Type 2 diabetes mellitus with diabetic neuropathy ([facility] 03/14/2018 Priority: 1 - One  Type 2 diabetes mellitus with nephropathy ([facility] 12/14/2017 Priority: 1 - One  Lumbar stenosis with neurogenic claudication 09/14/2017 Priority: 1 - One  
 Diabetes ([facility] 09/24/2015 Priority: 1 - One  
 Hypertension 09/24/2015 Priority: 1 - One  
 Hypercholesterolemia 09/24/2015 Priority: 1 - One  Anemia 12/14/2017 Priority: 2 - Two  ASCVD (arteriosclerotic cardiovascular disease) 09/28/2017 Priority: 2 - Two  CKD (chronic kidney disease) stage 3, GFR 30-59 ml/min 09/24/2015 Priority: 2 - Two  CAD (coronary artery disease) 09/24/2015 Priority: 2 - Two  B12 deficiency 09/28/2017 Priority: 3 - Three  History of gastritis 09/28/2017 Priority: 3 - Three  CVD (cerebrovascular disease) 09/28/2017 Priority: 3 - Three  Benign non-nodular prostatic hyperplasia with lower urinary tract symptoms 09/24/2015 Priority: 3 - Three  Adrenal adenoma 09/28/2017 Priority: 4 - Four 10 mg/ml (1%) injection 5 mg  0.5 mL Intradermal Once Raz Ruff MD         Current Outpatient Medications on File Prior to Encounter   Medication Sig Dispense Refill    CHOLESTYRAMINE LIGHT 4 gram packet Take 1 packet by mouth 2 (two) times daily.      cyclobenzaprine (FLEXERIL) 10 MG tablet Take 1 tablet (10 mg total) by mouth 3 (three) times daily as needed for Muscle spasms. 30 tablet 1    diltiaZEM (CARDIZEM CD) 120 MG Cp24 Take 1 capsule (120 mg total) by mouth once daily. 90 capsule 0    diphenhydrAMINE (BENADRYL) 25 mg capsule Take 25 mg by mouth every 12 (twelve) hours as needed for Itching.      gabapentin (NEURONTIN) 100 MG capsule Take 100 mg by mouth nightly as needed. HCS      levothyroxine (SYNTHROID) 88 MCG tablet Take 1 tablet (88 mcg total) by mouth before breakfast. 90 tablet 0    promethazine (PHENERGAN) 25 MG tablet TAKE 1 TABLET(25 MG) BY MOUTH TWICE DAILY AS NEEDED FOR NAUSEA (Patient taking differently: Take 25 mg by mouth 2 (two) times daily as needed for Nausea.) 60 tablet 0    sertraline (ZOLOFT) 50 MG tablet Take 50 mg by mouth every evening.      doxycycline (VIBRAMYCIN) 100 MG Cap Take 100 mg by mouth every 12 (twelve) hours. HCS      ferric citrate (AURYXIA) 210 mg iron Tab Take 2 tablets by mouth 3 (three) times daily with meals.      traMADoL (ULTRAM) 50 mg tablet Take 1 tablet (50 mg total) by mouth every 8 (eight) hours as needed for Pain. 40 tablet 0       Scheduled meds:   cefTRIAXone (ROCEPHIN) IVPB  1 g Intravenous Q24H    cholestyramine-aspartame  1 packet Oral BID    diltiaZEM  120 mg Oral Daily    enoxaparin  30 mg Subcutaneous Daily    fluconazole  100 mg Oral QHS    levothyroxine  88 mcg Oral Before breakfast    miconazole   Topical (Top) BID    Questran and Aquaphor Topical Compound   Topical (Top) BID    sertraline  50 mg Oral QHS    vancomycin (VANCOCIN) IVPB  1,250 mg Intravenous Once       Infusions:      PRN meds:  acetaminophen,   MCI (mild cognitive impairment) 09/28/2017 Priority: 4 - Four  Allergic rhinitis 09/28/2017 Priority: 5 - Five  Black hairy tongue 09/28/2017 Priority: 5 - Five  Burning mouth syndrome 09/28/2017 Priority: 5 - Five  Vitamin D deficiency 09/28/2017 Priority: 5 - Five  Heart murmur 06/12/2018  PE (physical exam), annual 09/28/2017 Past Surgical History:  
Procedure Laterality Date Nolia Stamp CARDIAC SURG PROCEDURE UNLIST    
 bypass(1985), XXBRCL(7904, 2004)  HX CATARACT REMOVAL Bilateral   
 HX CHOLECYSTECTOMY  HX LUMBAR LAMINECTOMY  09/2017  HX ORTHOPAEDIC  09/23/2015  
 back surgery  ME COLONOSCOPY FLX DX W/COLLJ SPEC WHEN PFRMD  8/12/2013  ME EGD TRANSORAL BIOPSY SINGLE/MULTIPLE  6/28/2012  VASCULAR SURGERY PROCEDURE UNLIST  2/1996  
 left carotid Allergies Allergen Reactions  Tetanus Vaccines And Toxoid Swelling Swelling at the site  Tradjenta [Linagliptin] Diarrhea  
  headache Family History Problem Relation Age of Onset  Heart Disease Mother  Heart Disease Father Social History Social History  Marital status:  Spouse name: N/A  
 Number of children: N/A  
 Years of education: N/A Occupational History  Not on file. Social History Main Topics  Smoking status: Former Smoker Quit date: 6/3/1967  Smokeless tobacco: Never Used  Alcohol use No  
 Drug use: No  
 Sexual activity: Not on file Other Topics Concern  Not on file Social History Narrative Outpatient Prescriptions Marked as Taking for the 9/27/18 encounter (Office Visit) with Joanne Taylor MD  
Medication Sig Dispense Refill  colesevelam (WELCHOL) 625 mg tablet TAKE 3 TABLETS BY MOUTH TWICE DAILY 180 Tab 0  
 hydrALAZINE (APRESOLINE) 25 mg tablet TAKE 1 TABLET BY MOUTH TWICE DAILY 60 Tab 0  
 gabapentin (NEURONTIN) 300 mg capsule TAKE 1 CAPSULE BY MOUTH TWICE DAILY 60 Cap 0  
 cyclobenzaprine, diphenhydrAMINE, gabapentin, hydrALAZINE, LIDOcaine-prilocaine, morphine, oxyCODONE-acetaminophen, promethazine (PHENERGAN) IVPB, sodium chloride 0.9%, sodium chloride 0.9%, vancomycin - pharmacy to dose    Review of Systems:  Off floor    OBJECTIVE:     Vital Signs and IO (Last 24H):  Temp:  [97.6 °F (36.4 °C)-98.3 °F (36.8 °C)]   Pulse:  [63-76]   Resp:  [11-20]   BP: (117-171)/(56-86)   SpO2:  [92 %-100 %]   I/O last 3 completed shifts:  In: 100 [IV Piggyback:100]  Out: -     Wt Readings from Last 5 Encounters:   01/18/22 68.3 kg (150 lb 9.2 oz)   09/15/21 63 kg (139 lb)   05/24/21 63 kg (139 lb)   05/19/21 66.7 kg (147 lb 0.8 oz)   05/13/21 63.5 kg (139 lb 15.9 oz)     Physical Exam:  In OR    Body mass index is 23.24 kg/m².    Laboratory:  Recent Labs   Lab 01/23/22  0552 01/24/22  0856 01/25/22  0427    141 141   K 4.2 4.3 4.2    109 109   CO2 21* 19* 18*   BUN 37* 43* 52*   CREATININE 4.7* 5.2* 5.8*   ESTGFRAFRICA 10.1* 8.9* 7.8*   EGFRNONAA 8.7* 7.7* 6.8*   GLU 85 81 75       Recent Labs   Lab 01/21/22  0424 01/22/22  0426 01/23/22  0552 01/24/22  0856 01/25/22  0427   CALCIUM 7.6*   < > 8.7 8.7 8.5*   MG 1.8  --   --   --   --     < > = values in this interval not displayed.       Recent Labs   Lab 09/30/19  0820 11/15/19  0748   PTH, Intact 176.4 H 238.4 H       No results for input(s): POCTGLUCOSE in the last 168 hours.    Recent Labs   Lab 11/15/19  0748 01/22/20  0848 05/25/21  0847   Hemoglobin A1C Invalid A SEE COMMENT 4.6       Recent Labs   Lab 01/22/22  0427 01/23/22  0552 01/25/22  0427   WBC 5.73 5.68 5.15   HGB 9.4* 8.9* 8.9*   HCT 30.3* 29.4* 29.0*   * 128* 154   * 122* 120*   MCHC 31.0* 30.3* 30.7*       No results for input(s): BILITOT, BILIDIR, PROT, ALBUMIN, ALKPHOS, ALT, AST in the last 168 hours.    Recent Labs   Lab 12/09/20  2323 12/09/20  2323 03/15/21  1533 05/17/21  1819 01/18/22  0331   Color, UA Yellow   < > Yellow Yellow Yellow   tamsulosin (FLOMAX) 0.4 mg capsule Take 2 Caps by mouth daily. 180 Cap 3  
 sAXagliptin (ONGLYZA) 2.5 mg tablet Take 1 Tab by mouth daily. 90 Tab 3  
 TRUE METRIX GLUCOSE TEST STRIP strip USE 1 STRIP TO TEST BLOOD SUGAR EVERY  Strip 0  
 nitroglycerin (NITROSTAT) 0.4 mg SL tablet 1 Tab by SubLINGual route every five (5) minutes as needed for Chest Pain. 25 Tab 3  
 acetaminophen (TYLENOL EXTRA STRENGTH) 500 mg tablet Take  by mouth every six (6) hours as needed for Pain.  clotrimazole-betamethasone (LOTRISONE) 1-0.05 % lotion Apply  to affected area two (2) times a day.  glipiZIDE (GLUCOTROL) 10 mg tablet Take 1.5 Tabs by mouth two (2) times a day. 270 Tab 3  cyanocobalamin (VITAMIN B12) 1,000 mcg/mL injection INJECT 1 ML SUBCUTANEOUS EVERY MONTH 90 mL 2  
 Lancets misc Use with Fastclix lancing device daily to test blood sugar. 100 Each 3  
 ACCU-CHEK FASTCLIX misc USE AS DIRECTED TO MONITOR BLOOD SUGAR 100 Each 3  
 aspirin delayed-release 81 mg tablet Take 81 mg by mouth daily.  polyethylene glycol (MIRALAX) 17 gram/dose powder Take 17 g by mouth daily as needed.  simvastatin (ZOCOR) 20 mg tablet Take 20 mg by mouth nightly.  Cholecalciferol, Vitamin D3, (VITAMIN D3) 1,000 unit cap Take 1,000 Units by mouth daily.  ferrous sulfate 325 mg (65 mg iron) tablet Take 325 mg by mouth two (2) times a day.  diltiazem CD (DILT-CD) 300 mg ER capsule Take 300 mg by mouth daily. Review of Systems Constitutional:  He denies fever, weight loss, sweats or fatigue. HEENT:  No blurred or double vision, headache or dizziness. No difficulty with swallowing, taste, speech or smell. Respiratory:  No cough, wheezing or shortness of breath. No sputum production. Cardiac:  Denies chest pain, palpitations, unexplained indigestion, syncope, edema, PND or orthopnea.    
GI:  No changes in bowel movements, no abdominal pain, no bloating,   Appearance, UA Cloudy A   < > Cloudy A Hazy A Clear   pH, UA 6.0   < > 7.0 8.0 7.0   Specific [place], UA 1.020   < > 1.015 1.015 1.010   Protein, UA 1+ A   < > 2+ A 2+ A Trace A   Glucose, UA Negative   < > Negative Negative Negative   Ketones, UA Negative   < > Negative Negative Negative   Urobilinogen, UA Negative   < > Negative Negative Negative   Bilirubin (UA) Negative   < > Negative Negative Negative   Occult Blood UA 3+ A   < > 2+ A 2+ A 2+ A   Nitrite, UA Negative   < > Negative Negative Negative   RBC, UA 1   < > 21 H 7 H 10 H   WBC, UA >100 H   < > >100 H 80 H 50 H   Bacteria Negative  --  Negative Few A  --    Hyaline Casts, UA 10 A  --  40 A 0  --     < > = values in this interval not displayed.       Recent Labs   Lab 03/16/20  1551 12/08/20 2024   POC PH 7.402 7.370   POC PCO2 39.9 39.0   POC HCO3 24.9 22.5 L   POC PO2 58 LL 85   POC SATURATED O2 90 L 96   POC BE 0 -3   Sample ARTERIAL ARTERIAL       Microbiology Results (last 7 days)     Procedure Component Value Units Date/Time    Blood culture #2 **CANNOT BE ORDERED STAT** [[id number]] Collected: 01/16/22 1130    Order Status: Completed Specimen: Blood from Peripheral, Antecubital, Right Updated: 01/21/22 1432     Blood Culture, Routine No growth after 5 days.    Blood culture #1 **CANNOT BE ORDERED STAT** [[id number]] Collected: 01/16/22 1115    Order Status: Completed Specimen: Blood from Peripheral, Antecubital, Right Updated: 01/21/22 1232     Blood Culture, Routine No growth after 5 days.    Urine culture [[id number]] Collected: 01/18/22 0331    Order Status: Completed Specimen: Urine Updated: 01/20/22 0928     Urine Culture, Routine No growth    Narrative:      Specimen Source->Urine        ASSESSMENT/PLAN:     ESRD on HD MWF   Acidosis  HD today and then resume MWF schedule  Vascular consulted for tunneled catheter- getting access now     Anemia of CKD  Stable- ordered ADAM with HD    MBD / Secondary HPT  Continue renal diet- last phos was at  anorexia, nausea, vomiting or heartburn. :  No frequency or dysuria. Denies incontinence. Extremities:  No joint pain, stiffness or swelling. Skin:  No recent rashes or mole changes. Neurological:  No numbness, tingling, burning paresthesias or loss of motor strength. No syncope, dizziness, frequent headaches or memory loss. Objective:  
 
Vitals:  
 09/27/18 1413 BP: 140/58 Pulse: 75 Temp: 98 °F (36.7 °C) TempSrc: Oral  
SpO2: 94% Weight: 160 lb 12.8 oz (72.9 kg) Height: 5' 5\" (1.651 m) PainSc:   0 - No pain Body mass index is 26.76 kg/(m^2). Physical Examination:  
           General Appearance:  Well-developed, well-nourished, no acute  distress. HEENT:   
 Ears:  The TMs and ear canals were clear. Eyes:  The pupillary responses were normal.  Extraocular muscle function intact. Lids and conjunctiva not injected. Neck:  Supple without thyromegaly or adenopathy. No JVD noted. Lungs:  Clear to auscultation and percussion. Cardiac:  Regular rate and rhythm without murmur. GI: nontender w/o mass. Normal BS's. Extremities:  Avulsion site of ingrown toenail looks good w/o drainage Skin:  No rash or unusual mole changes noted. Neurological:  Grossly normal.     
 
 
 
Assessment/Plan:  
Impressions: ICD-10-CM ICD-9-CM 1. Essential hypertension I10 401.9 AMB POC BASIC METABOLIC PANEL 2. Encounter for immunization Z23 V03.89 INFLUENZA VACCINE INACTIVATED (IIV), SUBUNIT, ADJUVANTED, IM  
   ADMIN INFLUENZA VIRUS VAC 3. Type 2 diabetes mellitus with nephropathy (HCC) E11.21 250.40 AMB POC BASIC METABOLIC PANEL  
  837.62 4. CKD (chronic kidney disease) stage 3, GFR 30-59 ml/min N18.3 585.3 AMB POC BASIC METABOLIC PANEL  
5. Ingrown left big toenail L60.0 703.0 AMB POC BASIC METABOLIC PANEL Plan: 1. Continue present meds 2.  Discussed lifestyle modifications including Na restriction, low carb/fat diet, weight reduction and exercise (at least a walking program). Follow-up Disposition: 
Return in about 3 months (around 12/27/2018) for Fasting. Orders Placed This Encounter  Influenza Vaccine Inactivated (IIV)(FLUAD), Subunit, Adjuvanted, IM, (57484)  AMB POC BASIC METABOLIC PANEL  
 Administration fee () for Medicare insured patients Leticia Abdullahi MD  
 
 goal    HTN- high due to not getting HD  UF with HD today and tomorrow    Thank you for allowing us to participate in the care of your patient!   We will follow the patient and provide recommendations as needed.    Patient care time was spent personally by me on the following activities: > 35 min  · Obtaining a history.  · Examination of patient.  · Providing medical care at the patients bedside.  · Developing a treatment plan with patient or surrogate and bedside caregivers.  · Ordering and reviewing laboratory studies, radiographic studies, pulse oximetry.  · Ordering and performing treatments and interventions.  · Evaluation of patient's response to treatment.  · Discussions with consultants while on the unit and immediately available to the patient.  · Re-evaluation of the patient's condition.  · Documentation in the medical record.     Yasmin Parker MD      [facility]  [address] [place] [address]  [place] [address]    [phone] - tel  [phone] - fax    1/25/2022

## 2022-01-25 NOTE — PROGRESS NOTES
Pharmacokinetic Assessment Follow Up: IV Vancomycin    Vancomycin serum concentration assessment(s):    The random level was drawn correctly and can be used to guide therapy at this time. The measurement is within the desired definitive target range of 10 to 15 mcg/mL.    Vancomycin Regimen Plan:    Give vancomycin 1250 mg IV x 1 dose today after HD  Re-dose when the random level is less than 15 mcg/mL, next level to be drawn with AM labs on 1/27    Drug levels (last 3 results):  Recent Labs   Lab Result Units 01/25/22  0427   Vancomycin, Random ug/mL 13.0       Pharmacy will continue to follow and monitor vancomycin.    Please contact pharmacy at extension [phone] for questions regarding this assessment.    Thank you for the consult,   Guerrero Hall       Patient brief summary:  Shara Hutchins is a 71 y.o. female initiated on antimicrobial therapy with IV Vancomycin for treatment of skin & soft tissue infection    The patient's current regimen is Intermittent dosing based on levels    Drug Allergies:   Review of patient's allergies indicates:   Allergen Reactions    Ciprofloxacin Other (See Comments)     Elevated liver enzymes      Pcn [penicillins] Anaphylaxis     TOLERATES ROCEPHIN WITHOUT DIFFICULTY       Actual Body Weight:   68.3 kg    Renal Function:   Estimated Creatinine Clearance: 8.8 mL/min (A) (based on SCr of 5.8 mg/dL (H)).,     Dialysis Method (if applicable):  intermittent HD    CBC (last 72 hours):  Recent Labs   Lab Result Units 01/23/22  0552 01/25/22  0427   WBC K/uL 5.68 5.15   Hemoglobin g/dL 8.9* 8.9*   Hematocrit % 29.4* 29.0*   Platelets K/uL 128* 154       Metabolic Panel (last 72 hours):  Recent Labs   Lab Result Units 01/23/22  0552 01/24/22  0856 01/25/22  0427   Sodium mmol/L 140 141 141   Potassium mmol/L 4.2 4.3 4.2   Chloride mmol/L 107 109 109   CO2 mmol/L 21* 19* 18*   Glucose mg/dL 85 81 75   BUN mg/dL 37* 43* 52*   Creatinine mg/dL 4.7* 5.2* 5.8*       Vancomycin  Administrations:  vancomycin given in the last 96 hours        No antibiotic orders with administrations found.                    Microbiologic Results:  Microbiology Results (last 7 days)       Procedure Component Value Units Date/Time    Blood culture #2 **CANNOT BE ORDERED STAT** [[id number]] Collected: 01/16/22 1130    Order Status: Completed Specimen: Blood from Peripheral, Antecubital, Right Updated: 01/21/22 1432     Blood Culture, Routine No growth after 5 days.    Blood culture #1 **CANNOT BE ORDERED STAT** [[id number]] Collected: 01/16/22 1115    Order Status: Completed Specimen: Blood from Peripheral, Antecubital, Right Updated: 01/21/22 1232     Blood Culture, Routine No growth after 5 days.    Urine culture [[id number]] Collected: 01/18/22 0331    Order Status: Completed Specimen: Urine Updated: 01/20/22 0928     Urine Culture, Routine No growth    Narrative:      Specimen Source->Urine

## 2022-01-25 NOTE — ANESTHESIA PREPROCEDURE EVALUATION
01/25/2022  Shara Hutchins is a 71 y.o., female.  Patient Active Problem List   Diagnosis    Inflammatory bowel disease    Vitamin D deficiency    Hisotry of Vulvar cancer    Anemia of decreased vitamin B12 absorption    Anemia due to stage 5 chronic kidney disease treated with erythropoietin    Hypothyroidism    Anemia of chronic disease    Nausea & vomiting    Chronic diarrhea    S/P ileostomy    DARCI (acute kidney injury)    Severe dehydration due to chronic diarrhea/high output ileostomy    Electrolyte imbalance    Severe malnutrition    History of difficult venous access    Unstageable pressure injury of skin and tissue    Malnutrition    Acute hypoxemic respiratory failure    Pulmonary hypertension    Status post reversal of ileostomy    Mild protein-energy malnutrition    Stage 4 chronic kidney disease    Perineal rash, female    Perianal cellulitis    Pneumonia due to infectious organism    Right hydroureter secondary to obstructing calculi    Right-sided pyelonephritis    ESRD on dialysis MWF    Macrocytic anemia    Constipation    Bilateral LE neuropathy    Essential hypertension    Nephrolithiasis    Sepsis    Hypomagnesemia    Acute on chronic anemia    Vulvar cellulitis    Lymphedema due to radiation    Urinary incontinence    S/P ureteral stent placement    Thrombocytopenia                                                                                                                  01/25/2022  Shara Hutchins is a 71 y.o., female   Patient Active Problem List   Diagnosis    Inflammatory bowel disease    Vitamin D deficiency    Hisotry of Vulvar cancer    Anemia of decreased vitamin B12 absorption    Anemia due to stage 5 chronic kidney disease treated with erythropoietin    Hypothyroidism    Anemia of chronic disease    Nausea & vomiting    Chronic  diarrhea    S/P ileostomy    DARCI (acute kidney injury)    Severe dehydration due to chronic diarrhea/high output ileostomy    Electrolyte imbalance    Severe malnutrition    History of difficult venous access    Unstageable pressure injury of skin and tissue    Malnutrition    Acute hypoxemic respiratory failure    Pulmonary hypertension    Status post reversal of ileostomy    Mild protein-energy malnutrition    Stage 4 chronic kidney disease    Perineal rash, female    Perianal cellulitis    Pneumonia due to infectious organism    Right hydroureter secondary to obstructing calculi    Right-sided pyelonephritis    ESRD on dialysis MWF    Macrocytic anemia    Constipation    Bilateral LE neuropathy    Essential hypertension    Nephrolithiasis    Sepsis    Hypomagnesemia    Acute on chronic anemia    Vulvar cellulitis    Lymphedema due to radiation    Urinary incontinence    S/P ureteral stent placement    Thrombocytopenia       Past Surgical History:   Procedure Laterality Date    ABDOMINAL SURGERY      ANGIOGRAPHY OF ARTERIOVENOUS SHUNT N/A 1/19/2022    Procedure: Fistulogram with Possible Intervention;  Surgeon: Raz Maher MD;  Location: [facility] CATH/EP LAB;  Service: Cardiothoracic;  Laterality: N/A;    APPENDECTOMY      AV FISTULA PLACEMENT Left 2018    BACK SURGERY      BLADDER FULGURATION N/A 6/16/2020    Procedure: FULGURATION, BLADDER;  Surgeon: Damari Barber Jr., MD;  Location: [facility];  Service: Urology;  Laterality: N/A;    carpel tunnel surgery once on each hand      CERVICAL FUSION      x 4    cervix repair      CHOLECYSTECTOMY  2000    COLECTOMY      CYSTOSCOPY W/ RETROGRADES  6/16/2020    Procedure: CYSTOSCOPY, WITH RETROGRADE PYELOGRAM;  Surgeon: Damari Barber Jr., MD;  Location: [facility] OR;  Service: Urology;;    CYSTOSCOPY W/ RETROGRADES Right 4/20/2021    Procedure: CYSTOSCOPY, WITH RETROGRADE PYELOGRAM;  Surgeon: Damari Barber Jr., MD;  Location:  [facility] OR;  Service: Urology;  Laterality: Right;    CYSTOSCOPY W/ URETERAL STENT PLACEMENT Right 3/16/2021    Procedure: CYSTOSCOPY, WITH URETERAL STENT INSERTION;  Surgeon: Damari Barber Jr., MD;  Location: [facility] OR;  Service: Urology;  Laterality: Right;    CYSTOSCOPY W/ URETERAL STENT PLACEMENT Right 5/18/2021    Procedure: CYSTOSCOPY, WITH URETERAL STENT INSERTION;  Surgeon: Damari Barber Jr., MD;  Location: [facility] OR;  Service: Urology;  Laterality: Right;    CYSTOSCOPY W/ URETERAL STENT REMOVAL Right 4/20/2021    Procedure: CYSTOSCOPY, WITH URETERAL STENT REMOVAL;  Surgeon: Damari Barber Jr., MD;  Location: [facility] OR;  Service: Urology;  Laterality: Right;    CYSTOSCOPY WITH URETEROSCOPY, RETROGRADE PYELOGRAPHY, AND INSERTION OF STENT Right 3/30/2021    Procedure: CYSTOSCOPY, WITH RETROGRADE PYELOGRAM AND URETERAL STENT INSERTION;  Surgeon: Damari Barber Jr., MD;  Location: [facility] OR;  Service: Urology;  Laterality: Right;    CYSTOURETEROSCOPY WITH RETROGRADE PYELOGRAPHY AND INSERTION OF STENT INTO URETER Right 1/18/2022    Procedure: CYSTOURETEROSCOPY, WITH RETROGRADE PYELOGRAM AND URETERAL STENT INSERTION;  Surgeon: Damari Barber Jr., MD;  Location: [facility] OR;  Service: Urology;  Laterality: Right;    DIAGNOSTIC LAPAROSCOPY N/A 2/13/2020    Procedure: LAPAROSCOPY, DIAGNOSTIC;  Surgeon: Robbi Lovell III, MD;  Location: [facility] OR;  Service: General;  Laterality: N/A;    HYSTERECTOMY  1980    ILEOSTOMY N/A 2/13/2020    Procedure: CREATION, ILEOSTOMY Loop;  Surgeon: Robbi Lovell III, MD;  Location: [facility] OR;  Service: General;  Laterality: N/A;    ILEOSTOMY CLOSURE N/A 5/18/2020    Procedure: CLOSURE, ILEOSTOMY;  Surgeon: Robbi Lovell III, MD;  Location: [facility] OR;  Service: General;  Laterality: N/A;    LASER LITHOTRIPSY Right 3/30/2021    Procedure: LITHOTRIPSY, USING LASER;  Surgeon: Damari Barber Jr., MD;  Location: [facility] OR;  Service: Urology;  Laterality: Right;    LUMBAR LAMINECTOMY       LYSIS OF ADHESIONS N/A 2/13/2020    Procedure: LYSIS, ADHESIONS;  Surgeon: Robbi Lovell III, MD;  Location: [facility] OR;  Service: General;  Laterality: N/A;    NECK SURGERY      PERCUTANEOUS TRANSLUMINAL ANGIOPLASTY OF ARTERIOVENOUS FISTULA N/A 1/19/2022    Procedure: PTA, AV FISTULA;  Surgeon: Raz Maher MD;  Location: [facility] CATH/EP LAB;  Service: Cardiothoracic;  Laterality: N/A;    PHLEBOGRAPHY  2/28/2020    Procedure: VENOGRAM;  Surgeon: Robbi Lovell III, MD;  Location: [facility] OR;  Service: General;;    REMOVAL OF VASCULAR ACCESS PORT Right 2/13/2020    Procedure: REMOVAL, VASCULAR ACCESS PORT;  Surgeon: Robbi Lovell III, MD;  Location: [facility] OR;  Service: General;  Laterality: Right;    RETROGRADE PYELOGRAPHY  3/16/2021    Procedure: PYELOGRAM, RETROGRADE;  Surgeon: Damari Barber Jr., MD;  Location: [facility] OR;  Service: Urology;;    RETROGRADE PYELOGRAPHY Right 5/18/2021    Procedure: PYELOGRAM, RETROGRADE;  Surgeon: Damari Barber Jr., MD;  Location: [facility] OR;  Service: Urology;  Laterality: Right;    SHOULDER SURGERY      URETEROSCOPIC REMOVAL OF URETERIC CALCULUS Right 3/30/2021    Procedure: REMOVAL, CALCULUS, URETER, URETEROSCOPIC;  Surgeon: Damari Barber Jr., MD;  Location: [facility] OR;  Service: Urology;  Laterality: Right;    URETEROSCOPIC REMOVAL OF URETERIC CALCULUS Right 4/20/2021    Procedure: REMOVAL, CALCULUS, URETER, URETEROSCOPIC;  Surgeon: Damari Barber Jr., MD;  Location: [facility];  Service: Urology;  Laterality: Right;    vulva cancer          Tobacco Use:  The patient  reports that she quit smoking about 22 years ago. Her smoking use included cigarettes. She has a 33.00 pack-year smoking history. She quit smokeless tobacco use about 22 years ago.     Results for orders placed or performed during the hospital encounter of 01/16/22   EKG 12-lead    Collection Time: 01/25/22  6:42 AM    Narrative    Test Reason : Z01.818,    Vent. Rate : 068 BPM     Atrial Rate : 068  BPM     P-R Int : 178 ms          QRS Dur : 096 ms      QT Int : 418 ms       P-R-T Axes : 051 000 019 degrees     QTc Int : 444 ms    Normal sinus rhythm  Minimal voltage criteria for LVH, may be normal variant ( Lachine product )  Inferior infarct ,age undetermined  Abnormal ECG  When compared with ECG of 15-SEP-2021 14:32,  Premature atrial complexes are no longer Present  Inferior infarct is now Present  QT has shortened    Referred By: [email or web address]   SELF           Confirmed By:              Lab Results   Component Value Date    WBC 5.15 01/25/2022    HGB 8.9 (L) 01/25/2022    HCT 29.0 (L) 01/25/2022     (H) 01/25/2022     01/25/2022     BMP  Lab Results   Component Value Date     01/25/2022    K 4.2 01/25/2022     01/25/2022    CO2 18 (L) 01/25/2022    BUN 52 (H) 01/25/2022    CREATININE 5.8 (H) 01/25/2022    CALCIUM 8.5 (L) 01/25/2022    ANIONGAP 14 01/25/2022    ESTGFRAFRICA 7.8 (A) 01/25/2022    EGFRNONAA 6.8 (A) 01/25/2022       3/2020 ECHO    · Mild concentric left ventricular hypertrophy.  · Pulmonary hypertension present.  · Normal left ventricular systolic function. The estimated ejection fraction is 60%.  · Grade I (mild) left ventricular diastolic dysfunction consistent with impaired relaxation.  · No wall motion abnormalities.  · Normal right ventricular systolic function.  · Intermediate central venous pressure (8 mmHg).  · Trivial pericardial effusion.  · There is a left pleural effusion.  · The estimated PA systolic pressure is 47 mmHg          Pre-op Assessment    I have reviewed the Patient Summary Reports.     I have reviewed the Nursing Notes. I have reviewed the NPO Status.   I have reviewed the Medications.     Review of Systems  Anesthesia Hx:  No problems with previous Anesthesia  Denies Family Hx of Anesthesia complications.   Denies Personal Hx of Anesthesia complications.   Social:  Non-Smoker, No Alcohol Use    Hematology/Oncology:         -- Anemia: Hematology  Comments:     --  Cancer in past history (Vulvar cancinoma): s/p surgery, s/p radiation therapy and s/p chemotherapy     EENT/Dental:  EENT/Dental Normal Full upper and lower partial out.  Broken right lower molar   Cardiovascular:   Hypertension, well controlled ECG has been reviewed.    Renal/:   Chronic Renal Disease (Patient dialyzed yesterday. AV fistula hematoma, she is going to get a dialysis catheter placed later.), ESRD     Hepatic/GI:  Hepatic/GI Normal    Musculoskeletal:  Musculoskeletal Normal    Neurological:   Neuromuscular Disease, (LTAC)    Endocrine:   Diabetes Hypothyroidism    Psych:  Psychiatric Normal           Physical Exam  General:  Well nourished    Airway/Jaw/Neck:  Airway Findings: Mouth Opening: Normal Tongue: Normal  General Airway Assessment: Adult  Mallampati: II  Improves to II with phonation.  TM Distance: Normal, at least 6 cm  Jaw/Neck Findings:  Neck ROM: Extension Decreased, Mild      Dental:  Dental Findings: Upper partial dentures, Lower partial dentures   Chest/Lungs:  Chest/Lungs Findings: Clear to auscultation, Normal Respiratory Rate     Heart/Vascular:  Heart Findings: Rate: Normal  Rhythm: Regular Rhythm  Sounds: Normal        Mental Status:  Mental Status Findings:  Cooperative, Alert and Oriented         Anesthesia Plan  Type of Anesthesia, risks & benefits discussed:  Anesthesia Type:  MAC    Patient's Preference:   Plan Factors:          Intra-op Monitoring Plan: standard ASA monitors  Intra-op Monitoring Plan Comments:   Post Op Pain Control Plan: per primary service following discharge from PACU and IV/PO Opioids PRN  Post Op Pain Control Plan Comments:     Induction:   IV  Beta Blocker:  Patient is not currently on a Beta-Blocker (No further documentation required).       Informed Consent: Patient understands risks and agrees with Anesthesia plan.  Questions answered. Anesthesia consent signed with patient.  ASA Score: 3     Day of Surgery Review of History &  Physical:    H&P update referred to the surgeon.     Anesthesia Plan Notes: MAC  No decadron (patient has vulvar cellulitis)  Zofran, Pepcid  Ofirmev 1000mg             Ready For Surgery From Anesthesia Perspective.

## 2022-01-25 NOTE — PLAN OF CARE
Problem: Adult Inpatient Plan of Care  Goal: Plan of Care Review  Outcome: Ongoing, Progressing  Goal: Patient-Specific Goal (Individualized)  Outcome: Ongoing, Progressing  Goal: Absence of Hospital-Acquired Illness or Injury  Outcome: Ongoing, Progressing  Goal: Optimal Comfort and Wellbeing  Outcome: Ongoing, Progressing  Goal: Readiness for Transition of Care  Outcome: Ongoing, Progressing     Problem: Bleeding (Sepsis/Septic Shock)  Goal: Absence of Bleeding  Outcome: Ongoing, Progressing     Problem: Adjustment to Illness (Sepsis/Septic Shock)  Goal: Optimal Coping  Outcome: Ongoing, Progressing     Problem: Nutrition Impaired (Sepsis/Septic Shock)  Goal: Optimal Nutrition Intake  Outcome: Ongoing, Progressing

## 2022-01-25 NOTE — ANESTHESIA POSTPROCEDURE EVALUATION
Anesthesia Post Evaluation    Patient: Shara Hutchins    Procedure(s) Performed: Procedure(s) (LRB):  Insertion,catheter,tunneled (Right)    Final Anesthesia Type: MAC      Patient location during evaluation: PACU  Patient participation: Yes- Able to Participate  Level of consciousness: awake and alert  Post-procedure vital signs: reviewed and stable  Pain management: adequate  Airway patency: patent  EMMA mitigation strategies: Multimodal analgesia  PONV status at discharge: No PONV  Anesthetic complications: no      Cardiovascular status: blood pressure returned to baseline  Respiratory status: unassisted  Hydration status: euvolemic  Follow-up not needed.  Comments: On telemetry           Vitals Value Taken Time   /78 01/25/22 1300   Temp 36.4 °C (97.6 °F) 01/25/22 1230   Pulse 62 01/25/22 1304   Resp 12 01/25/22 1304   SpO2 98 % 01/25/22 1304   Vitals shown include unvalidated device data.      No case tracking events are documented in the log.      Pain/Kvng Score: Pain Rating Prior to Med Admin: 3 (1/25/2022 12:53 PM)  Pain Rating Post Med Admin: 4 (1/25/2022  5:00 AM)  Kvng Score: 9 (1/25/2022 12:30 PM)

## 2022-01-25 NOTE — OP NOTE
[facility]  Surgery Department  Operative Note    SUMMARY     Date of Procedure: 1/25/2022     Procedure: Procedure(s) (LRB):  Insertion,catheter,tunneled (N/A)     Surgeon(s) and Role:     * Raz Maher MD - Primary    Assisting Surgeon: None    Pre-Operative Diagnosis: ESRD on dialysis [N18.6, Z99.2]    Post-Operative Diagnosis: Post-Op Diagnosis Codes:     * ESRD on dialysis [N18.6, Z99.2]    Anesthesia: General    Operative Findings (including complications, if any):  End-stage renal disease    Description of Technical Procedures:  Right internal jugular tunneled dialysis catheter placement with ultrasound and fluoroscopic guidance    Right internal jugular vein accessed with ultrasound guidance and guidewire passed under fluoroscopy into the right atrium.  Catheter measured and tunneled through a counter incision up to the neck incision dilators passed sequentially over the guidewire under fluoroscopy dilator with tear we sheath advanced over the wire under fluoroscopy.  Dilator and wire were removed and the catheter advanced through the tear we sheath sheath was removed.  Catheter was checked for placement in the superior vena cava atrial junction had a gentle curve in the neck and aspirated and flushed well and was sewn in place the neck incision was closed with 4-0 Monocryl.    Significant Surgical Tasks Conducted by the Assistant(s), if Applicable:     Estimated Blood Loss (EBL): * No values recorded between 1/25/2022 11:41 AM and 1/25/2022 11:57 AM *           Implants:   Implant Name Type Inv. Item Serial No.  Lot No. LRB No. Used Action   CATHETER HEMOSPLIT 19CM [id number] - [id number]  CATHETER HEMOSPLIT 19CM [id number]   [id number] 1 Implanted       Specimens:   Specimen (24h ago, onward)            None                  Condition: Good    Disposition: PACU - hemodynamically stable.    Attestation: I was present and scrubbed for the entire procedure.

## 2022-01-25 NOTE — PROGRESS NOTES
"[facility]  Adult Nutrition   Progress Note (Follow-Up)    SUMMARY     Recommendations  Recommendation/Intervention:   1. RD recommends to continue current nutritional approaches, re: cardiac diet, Nepro and banatrol TID.   2. Menu  to continue to get dietary preferences daily.  Goals: Ensure 100% of patients nutritional needs are met with >75% of a PO diet and ONS's.  Nutrition Goal Status: goal met  Communication of RD Recs: other (comment) (patient)    Dietitian Rounds Brief  F/U Nutrition Note: Spoke with patient right after coming out of her procedure which went well as far as she knew and she states she would like to continue to get the Nepro and banatrol TID till she goes home and she thinks that will be tomorrow. Her only request was to have a root beer with all meals which was ordered. Patient has had no c/o N/V/D and her LBM was yesterday. Will continue to follow till discharge.    Reason for Assessment  Reason For Assessment: RD follow-up  Diagnosis: renal disease  Relevant Medical History: IBD, hx of vulvuar cancer, ESRD-on HD, HTN, vitamin D deficiency, B12 deficiency, severe malnutrition  Interdisciplinary Rounds: attended    Nutrition Risk Screen  Nutrition Risk Screen: no indicators present       Wound 01/20/22 1000 Incontinence associated dermatitis anterior;posterior Vagina-Wound Image: Images linked  MST Score: 0  Have you recently lost weight without trying?: No  Weight loss score: 0  Have you been eating poorly because of a decreased appetite?: No  Appetite score: 0       Nutrition/Diet History  Food Allergies: NKFA  Factors Affecting Nutritional Intake: None identified at this time    Anthropometrics  Temp: 97.7 °F (36.5 °C)  Height Method: Stated  Height: 5' 7.5" (171.5 cm)  Height (inches): 67.5 in  Weight Method: Standard Scale  Weight: 68.3 kg (150 lb 9.2 oz)  Weight (lb): 150.58 lb  Ideal Body Weight (IBW), Female: 137.5 lb  % Ideal Body Weight, Female (lb): 101.97 " %  BMI (Calculated): 23.2  BMI Grade: 18.5-24.9 - normal       Weight History:  Wt Readings from Last 10 Encounters:   01/18/22 68.3 kg (150 lb 9.2 oz)   09/15/21 63 kg (139 lb)   05/24/21 63 kg (139 lb)   05/19/21 66.7 kg (147 lb 0.8 oz)   05/13/21 63.5 kg (139 lb 15.9 oz)   04/20/21 63.5 kg (140 lb)   03/30/21 59.9 kg (132 lb)   03/15/21 62 kg (136 lb 11 oz)   02/04/21 62.6 kg (138 lb)   02/01/21 61.2 kg (135 lb)       Lab/Procedures/Meds: Pertinent Labs Reviewed    Clinical Chemistry:  Recent Labs   Lab 01/21/22  0424 01/22/22  0426 01/25/22  0427      < > 141   K 4.6   < > 4.2      < > 109   CO2 23   < > 18*   GLU 76   < > 75   BUN 35*   < > 52*   CREATININE 5.8*   < > 5.8*   CALCIUM 7.6*   < > 8.5*   ANIONGAP 9   < > 14   ESTGFRAFRICA 7.8*   < > 7.8*   EGFRNONAA 6.8*   < > 6.8*   MG 1.8  --   --     < > = values in this interval not displayed.       CBC:   Recent Labs   Lab 01/25/22  0427   WBC 5.15   RBC 2.41*   HGB 8.9*   HCT 29.0*      *   MCH 36.9*   MCHC 30.7*       Medications: Pertinent Medications reviewed    Scheduled Meds:   cefTRIAXone (ROCEPHIN) IVPB  1 g Intravenous Q24H    cholestyramine-aspartame  1 packet Oral BID    diltiaZEM  120 mg Oral Daily    enoxaparin  30 mg Subcutaneous Daily    fluconazole  100 mg Oral QHS    levothyroxine  88 mcg Oral Before breakfast    miconazole   Topical (Top) BID    Questran and Aquaphor Topical Compound   Topical (Top) BID    sertraline  50 mg Oral QHS    vancomycin (VANCOCIN) IVPB  1,250 mg Intravenous Once       Continuous Infusions:    PRN Meds:.acetaminophen, cyclobenzaprine, diphenhydrAMINE, gabapentin, hydrALAZINE, LIDOcaine-prilocaine, morphine, oxyCODONE-acetaminophen, promethazine (PHENERGAN) IVPB, sodium chloride 0.9%, sodium chloride 0.9%, vancomycin - pharmacy to dose    Estimated/Assessed Needs  Weight Used For Calorie Calculations: 63.6 kg (140 lb 3.4 oz)  Energy Calorie Requirements (kcal): 0436-8258 kcals/day  (20-25 kcals/kg)  Energy Need Method: Kcal/kg  Protein Requirements: 76-97 g/day (1.2-1.5 g/kg)  Weight Used For Protein Calculations: 63.6 kg (140 lb 3.4 oz)  Fluid Requirements (mL): 1 mL/kcal or per MD     RDA Method (mL): 1272       Nutrition Prescription Ordered  Current Diet Order: Cardiac  Oral Nutrition Supplement: Nepro and banatrol TID    Evaluation of Received Nutrient/Fluid Intake  Energy Calories Required: meeting needs  Protein Required: meeting needs  Fluid Required: meeting needs  Tolerance: tolerating     Intake/Output Summary (Last 24 hours) at 1/25/2022 1433  Last data filed at 1/25/2022 1300  Gross per 24 hour   Intake 590 ml   Output 10 ml   Net 580 ml      % Intake of Estimated Energy Needs: 75 - 100 %  % Meal Intake: 75 - 100 %    Nutrition Risk  Level of Risk/Frequency of Follow-up: low     Monitor and Evaluation  Food and Nutrient Intake: energy intake,food and beverage intake  Food and Nutrient Adminstration: diet order  Knowledge/Beliefs/Attitudes: food and nutrition knowledge/skill,beliefs and attitudes  Physical Activity and Function: nutrition-related ADLs and IADLs  Anthropometric Measurements: weight change,body mass index,weight  Biochemical Data, Medical Tests and Procedures: electrolyte and renal panel,gastrointestinal profile,glucose/endocrine profile,inflammatory profile,lipid profile  Nutrition-Focused Physical Findings: overall appearance     Nutrition Follow-Up  RD Follow-up?: Yes   Chantel Correia RD 01/25/2022   2:39 PM

## 2022-01-25 NOTE — ASSESSMENT & PLAN NOTE
Continue current abx with Vancomycin, Rocephin, Fluconazole  At d/c will continue fluconazole and cephalexin per ID recs previously

## 2022-01-25 NOTE — NURSING
Patient returned from surgery AAO x 4 , Right chest wall with tunnel cath in place drsg dry and in tact. Will continue to monitor.

## 2022-01-25 NOTE — INTERVAL H&P NOTE
The patient has been examined and the H&P has been reviewed:    I concur with the findings and changes have been noted since the H&P was written: nephrology requests tunnelled cath for av graft holiday.    Surgery risks, benefits and alternative options discussed and understood by patient/family.          Active Hospital Problems    Diagnosis  POA    *ESRD on dialysis MWF [N18.6, Z99.2]  Not Applicable     Chronic    Thrombocytopenia [D69.6]  Yes    Vulvar cellulitis [N76.2]  Yes    Lymphedema due to radiation [I89.0]  Yes    Urinary incontinence [R32]  Yes    S/P ureteral stent placement [Z96.0]  Not Applicable    Hypomagnesemia [E83.42]  Yes    Essential hypertension [I10]  Yes     Chronic    Right hydroureter secondary to obstructing calculi [N13.30]  Yes    Pulmonary hypertension [I27.20]  Yes    Hypothyroidism [E03.9]  Yes     Chronic    Anemia of chronic disease [D63.8]  Yes     Chronic    Chronic diarrhea [K52.9]  Yes     Chronic    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

## 2022-01-25 NOTE — PROGRESS NOTES
[facility] Medicine  Progress Note    Patient Name: Shara Hutchins  MRN: [id number]  Patient Class: IP- Inpatient   Admission Date: 1/16/2022  Length of Stay: 9 days  Attending Physician: Shemar Hernandez MD  Primary Care Provider: April Horton MD        Subjective:     Principal Problem:ESRD on dialysis        HPI:  Patient presents to the emergency department with vulvar cellulitis that is recurrent.  The patient states that about 1 week ago she developed itching, redness, increased pain to the vulva.  She has a pertinent past medical history of fall vol cancer status post radiation and has since had recurrent episodes of cellulitis in the area.  She reports applying different types of creams such as Neosporin, Reginald Butt paste, calseptin, Aquaphor, and others with no improvement and about 3 days ago the symptoms worsened.  Her history is also complicated by chronic diarrhea since being treated with radiation in which she has loose incontinent stools multiple times per day.     The patient's current problem of cellulitis begins at superior-most aspect of her vulva and extends across the entire perineum to between the gluteal folds. The cellulitis also extends into the groin bilaterally.  The patient's pain has progressed to severe, nonradiating, described as burning, itching and dull.  Unrelieved by any home analgesics or topical medications.  Associated with intermittent low grade fever (not measured at home), occasional bleeding from excoriations of the skin, and hardened swelling of the affected area.  Or she also has intermittent nausea she believes is associated with the severe pain.  No vomiting.  The patient has been evaluated and treated by Dr. Blanca Neal in the past.  The patient was treated with meropenem and vancomycin in the emergency department.  PCN allergy noted and the patient reported tolerating meropenem and the past.     Imaging obtained in the emergency  "department incidentally revealed an obstructive, right-sided nephrolithiasis by 6 mm kidney stone that is causing perinephric fat stranding/hydronephrosis..  She has a history of nephrolithiasis and a CT.  The patient did receive IV contrast in the emergency department for this imaging and has hemodialysis 3 times a week overseen by Dr. Dietz.  The patient denied flank pain, hematuria, increased urination or urgency.  She does have a history of anuria.      Overview/Hospital Course:  01/18  Assumed care. Chart reviewed. Patient seen at RRT: after cystoscopy/stent. Feels "Good". Labs reviewed and noted below: no leukocytosis with stable macrocytic anemia; trivial hyponatremia with end stage renal dysfunction    01/19  Had stent re-do today, followed by RRT. Only complaint is vaginitis is persisting. Feels "Good" and would like to be discharged when cleared by Renal and ID. Labs reviewed and noted below: no leukocytosis with stable macrocytic anemia. Continue current regimen.     01/20  Discussed with Wound Care: to make poultice with Aquaphor and Questran for vaginal topical application. Vaginal area continues to be source of great discomfort. For RRT in AM. Lab holiday today. Ordered for AM before RRT    01/21  Discussed with Vascular and Renal: may need to allow fistula to rest--RRT RN had difficult time cannulating fistula--but it is functioning properly. Vaginitis has improved with current therapy. Discussed with : to order Home Health at discharge for poultice application and vaginitis follow-up after discharge. Will monitor over weekend and see how difficult fistula is at RRT on 01/224. Plan: continue current regimen; lab holiday in AM.    01/22  Discussed again with Vascular and Renal: will have catheter placed to give fistula some rest time. Patient feels "OK". Vaginal area is less painful with current regimen. Lab holiday today. Patient request cardiac monitoring to be " "discontinued--ordered.    01/23  Resting today. No new complaints. Vaginal topically feels "Better" after poultice application. Waiting for dialysis catheter (see 01/22 note); expect next 24-48 hrs to place--may need to use fistula in AM. Lbs reviewed and noted below: no leukocytosis with stable macrocytic anemia; electrolytes normal with end stage renal dysfunction.  Plan: continue current course/regimen; have dialysis catheter placed (see above); AM labs for review    01/24  Consultants' attendance noted and appreciated. For HD catheter placement--see above (hopefully today) to allow stent in fistula to mature, Will need RRT as well. Topically, vaginal area feels "Much better" with poultice application.   Plan: Continue current regimen: RRT on M,W,F; AM labs for review; hopefully discharge soon    01/25: Feeling well, she is pending placement of vascular access planned for today with plans for dialysis this afternoon. Hope to go home tomorrow. No other complaints at this time.       Interval History: improving--hopefully discharge tomorrow    Review of Systems   Constitutional: Positive for fatigue.   HENT: Negative.    Eyes: Negative.    Respiratory: Negative.    Cardiovascular: Negative.    Gastrointestinal: Negative.    Endocrine: Negative.    Genitourinary: Negative.    Musculoskeletal: Negative.    Skin: Negative.    Allergic/Immunologic: Negative.    Neurological: Negative.    Hematological: Negative.    All other systems reviewed and are negative.    Objective:     Vital Signs (Most Recent):  Temp: 97.9 °F (36.6 °C) (01/24/22 0819)  Pulse: 64 (01/24/22 0819)  Resp: 18 (01/24/22 0819)  BP: (!) 193/90 (01/24/22 0819)  SpO2: 95 % (01/24/22 0819) Vital Signs (24h Range):  Temp:  [97.7 °F (36.5 °C)-98.1 °F (36.7 °C)] 97.9 °F (36.6 °C)  Pulse:  [57-76] 64  Resp:  [16-18] 18  SpO2:  [95 %-98 %] 95 %  BP: (152-193)/(76-92) 193/90     Weight: 68.3 kg (150 lb 9.2 oz)  Body mass index is 23.24 kg/m².    Intake/Output " Summary (Last 24 hours) at 1/24/2022 0847  Last data filed at 1/24/2022 0500  Gross per 24 hour   Intake 1460 ml   Output --   Net 1460 ml      Physical Exam  Vitals and nursing note reviewed.   Constitutional:       Appearance: She is well-developed and well-nourished.   HENT:      Head: Normocephalic and atraumatic.      Right Ear: External ear normal.      Left Ear: External ear normal.      Nose: Nose normal.      Mouth/Throat:      Mouth: Oropharynx is clear and moist.   Eyes:      Extraocular Movements: EOM normal.      Conjunctiva/sclera: Conjunctivae normal.      Pupils: Pupils are equal, round, and reactive to light.   Cardiovascular:      Rate and Rhythm: Normal rate and regular rhythm.      Pulses: Intact distal pulses.      Heart sounds: Normal heart sounds.      Comments: Good thrill LUE  Pulmonary:      Effort: Pulmonary effort is normal.      Breath sounds: Normal breath sounds.   Abdominal:      General: Bowel sounds are normal.      Palpations: Abdomen is soft.   Musculoskeletal:         General: Normal range of motion.      Cervical back: Normal range of motion and neck supple.   Skin:     General: Skin is warm and dry.      Capillary Refill: Capillary refill takes less than 2 seconds.      Comments: LUE: aging ecchymoses   Neurological:      Mental Status: She is alert and oriented to person, place, and time.   Psychiatric:         Mood and Affect: Mood and affect normal.         Behavior: Behavior normal.         Thought Content: Thought content normal.         Judgment: Judgment normal.         Significant Labs:   All pertinent labs within the past 24 hours have been reviewed.  BMP:   Recent Labs   Lab 01/23/22  0552   GLU 85      K 4.2      CO2 21*   BUN 37*   CREATININE 4.7*   CALCIUM 8.7     CBC:   Recent Labs   Lab 01/23/22  0552   WBC 5.68   HGB 8.9*   HCT 29.4*   *       Significant Imaging: I have reviewed all pertinent imaging results/findings within the past 24  hours.      Assessment/Plan:      * ESRD on dialysis MWF  Plans for Vascular access today  Dialysis after Access placed        Vulvar cellulitis  Continue current abx with Vancomycin, Rocephin, Fluconazole  At d/c will continue fluconazole and cephalexin per ID recs previously      Essential hypertension  Elevated BP , continue diltiazem  Adjust as needed   prn hydralazine for severe HTN      Anemia of chronic disease  Will get ADAM with dialysis  Nephrology following      Hypothyroidism  Continue synthroid        VTE Risk Mitigation (From admission, onward)         Ordered     enoxaparin injection 30 mg  Daily         01/16/22 1502     IP VTE HIGH RISK PATIENT  Once         01/16/22 1502     Place sequential compression device  Until discontinued         01/16/22 1502                Discharge Planning   RODRIGO: 1/26/2022     Code Status: Full Code   Is the patient medically ready for discharge?: No    Reason for patient still in hospital (select all that apply): Treatment  Discharge Plan A: Assisted Living   Discharge Delays: None known at this time              Shemar Hernandez MD  Department of Hospital Medicine   [facility]

## 2022-01-26 NOTE — CARE UPDATE
01/25/22 2143   Patient Assessment/Suction   Level of Consciousness (AVPU) alert   Respiratory Effort Unlabored   Expansion/Accessory Muscles/Retractions expansion symmetric;no retractions;no use of accessory muscles   All Lung Fields Breath Sounds clear   Rhythm/Pattern, Respiratory unlabored   PRE-TX-O2   O2 Device (Oxygen Therapy) room air   SpO2 96 %   Pulse Oximetry Type Intermittent   $ Pulse Oximetry - Single Charge Pulse Oximetry - Single   Pulse 75   Resp 18   Education   $ Education Other (see comment);15 min  (Respiratory assessment,o2,pox,deep diaphragmatic breathing exercises)   Respiratory Evaluation   $ Care Plan Tech Time 15 min   $ Eval/Re-eval Charges Evaluation   Evaluation For New Orders

## 2022-01-26 NOTE — PROGRESS NOTES
01/26/22 1254   Post-Hemodialysis Assessment   Rinseback Volume (mL) 250 mL   Blood Volume Processed (Liters) 53 L   Dialyzer Clearance Lightly streaked   Duration of Treatment (minutes) 180 minutes   Hemodialysis Intake (mL) 500 mL   Total UF (mL) 647 mL   Net Fluid Removal 147   Patient Response to Treatment cramping in hands when further UF attempted   Post-Treatment Weight 67.3 kg (148 lb 5.9 oz)   Treatment Weight Change -0.1   Post-Hemodialysis Comments stable post dialysis

## 2022-01-26 NOTE — PT/OT/SLP PROGRESS
Physical Therapy      Patient Name:  Shara Hutchins   MRN:  [id number]    Patient not seen today secondary to  (pt prepping for dialysis and then discharge upon arrival to room.  Nursing and pt both report patient is safe and independant with mobility and no eval needed. Sent message to Dr. Tompknis who states ok to dishcarge PT eval and treat order.). No additional follow up needed.

## 2022-01-26 NOTE — PLAN OF CARE
01/26/22 1428   Final Note   Assessment Type Final Discharge Note   Anticipated Discharge Disposition Home   Post-Acute Status   Discharge Delays None known at this time   Return to [facility] via facility transport.  CM called Tressa at [facility] to set up transport.  Discharge orders reviewed.  No further case management needs noted.

## 2022-01-26 NOTE — DISCHARGE SUMMARY
[facility]  Discharge Summary  Patient Name: Shara Hutchins MRN: [id number]   Patient Class: IP- Inpatient  Length of Stay: 10   Admission Date: 1/16/2022 10:23 AM Attending Physician: Shemar Hernandez MD   Primary Care Provider: April Horton MD Face-to-Face encounter date: 01/26/2022   Chief Complaint: Vaginal Pain (SORES IN VAGINAL AREA, SOME BLEEDING)    Date of Discharge: 1/26/2022  Discharge Disposition: Home or Self Care  Condition: Stable       Reason for Hospitalization     Vulvar Cellulitis  Nephrolithiasis with hydronephrosis  Malfunctioning AV Fistula      Patient Active Problem List   Diagnosis    Inflammatory bowel disease    Vitamin D deficiency    Hisotry of Vulvar cancer    Anemia of decreased vitamin B12 absorption    Anemia due to stage 5 chronic kidney disease treated with erythropoietin    Hypothyroidism    Anemia of chronic disease    Nausea & vomiting    Chronic diarrhea    S/P ileostomy    DARCI (acute kidney injury)    Severe dehydration due to chronic diarrhea/high output ileostomy    Electrolyte imbalance    Severe malnutrition    History of difficult venous access    Unstageable pressure injury of skin and tissue    Malnutrition    Acute hypoxemic respiratory failure    Pulmonary hypertension    Status post reversal of ileostomy    Mild protein-energy malnutrition    Stage 4 chronic kidney disease    Perineal rash, female    Perianal cellulitis    Pneumonia due to infectious organism    Right hydroureter secondary to obstructing calculi    Right-sided pyelonephritis    ESRD on dialysis MWF    Macrocytic anemia    Constipation    Bilateral LE neuropathy    Essential hypertension    Nephrolithiasis    Sepsis    Hypomagnesemia    Acute on chronic anemia    Vulvar cellulitis    Lymphedema due to radiation    Urinary incontinence    S/P ureteral stent placement    Thrombocytopenia       Brief History of Present Illness    Shara Hutchins is a 71  y.o.  female who  has a past medical history of Cellulitis of trunk, Chronic diarrhea (02/10/2020), Diabetes mellitus, type 2, End stage renal disease on dialysis (01/07/2018), Fatigue, Hypertension, Hypothyroidism (2/10/2020), Iron deficiency anemia due to chronic blood loss (1/7/2018), Pulmonary hypertension (3/20/2020), and Vulvar cancer (1/7/2019).. The patient presented to [facility] on 1/16/2022 with a primary complaint of Vaginal Pain (SORES IN VAGINAL AREA, SOME BLEEDING)    Patient presents to the emergency department with vulvar cellulitis that is recurrent.  The patient states that about 1 week ago she developed itching, redness, increased pain to the vulva.  She has a pertinent past medical history of fall vol cancer status post radiation and has since had recurrent episodes of cellulitis in the area.  She reports applying different types of creams such as Neosporin, Reginald Butt paste, calseptin, Aquaphor, and others with no improvement and about 3 days ago the symptoms worsened.  Her history is also complicated by chronic diarrhea since being treated with radiation in which she has loose incontinent stools multiple times per day.     The patient's current problem of cellulitis begins at superior-most aspect of her vulva and extends across the entire perineum to between the gluteal folds. The cellulitis also extends into the groin bilaterally.  The patient's pain has progressed to severe, nonradiating, described as burning, itching and dull.  Unrelieved by any home analgesics or topical medications.  Associated with intermittent low grade fever (not measured at home), occasional bleeding from excoriations of the skin, and hardened swelling of the affected area.  Or she also has intermittent nausea she believes is associated with the severe pain.  No vomiting.  The patient has been evaluated and treated by Dr. Blanca Neal in the past.  The patient was treated with meropenem and  vancomycin in the emergency department.  PCN allergy noted and the patient reported tolerating meropenem and the past.     Imaging obtained in the emergency department incidentally revealed an obstructive, right-sided nephrolithiasis by 6 mm kidney stone that is causing perinephric fat stranding/hydronephrosis..  She has a history of nephrolithiasis and a CT.  The patient did receive IV contrast in the emergency department for this imaging and has hemodialysis 3 times a week overseen by Dr. Dietz.  The patient denied flank pain, hematuria, increased urination or urgency.  She does have a history of anuria.    For the full HPI please refer to the History & Physical from this admission.    Hospital Course By Problem with Pertinent Findings     # Vulvar Cellulitis  - treated with broad antibiotics and wound care. She had the best relief with antibiotics and with application of aquaphor and questran poultice. She will be discharged to continue fluconazole and keflex with plans to follow with Dr. Neal in 2 weeks. She says she feels comfortable with ongoing care with the poultice. A prescription for the combination aquaphor and questran was sent to her pharmacy. She will continue for 14 days on keflex and fluconazole.     # Hydroureteronephrosis  # Nephrolithiasis  - she underwent cystoscopy with ureteral stent exchange with Dr. Barber on 01/18. She will follow up with Dr. Barber in 6 months.     # AV Fistula Malfunction  - her dialysis access began to malfunction. After discussion with her nephrology providers and vascular surgery, it was decided to let her fistula rest. She has had tunneled dialysis access placed and will continue her regular dialysis through this access at discharge. She will follow up with her nephrologists as directed at discharge.       Patient was seen and examined on the date of discharge and determined to be suitable for discharge.    Physical Exam  BP (!) 143/76   Pulse 69   Temp 98 °F (36.7  "°C)   Resp 20   Ht 5' 7.5" (1.715 m)   Wt 68.3 kg (150 lb 9.2 oz)   SpO2 98%   BMI 23.24 kg/m²   Vitals reviewed.    Constitutional: No distress.   HENT: Atraumatic.   Cardiovascular: Normal rate, regular rhythm and normal heart sounds.   Pulmonary/Chest: Effort normal. Clear to auscultation bilaterally. No wheezes.   Abdominal: Soft. Bowel sounds are normal. Exhibits no distension and no mass. No tenderness  Neurological: Alert.   Skin: Skin is warm and dry.     Following labs were Reviewed   Recent Labs   Lab 01/26/22 0427   WBC 4.15   HGB 8.5*   HCT 27.6*   *   CALCIUM 8.0*      K 4.1   CO2 25      BUN 31*   CREATININE 4.3*     No results found for: POCTGLUCOSE     BMP:   Recent Labs   Lab 01/25/22 0427 01/26/22 0427   GLU 75 81    140   K 4.2 4.1    102   CO2 18* 25   BUN 52* 31*   CREATININE 5.8* 4.3*   CALCIUM 8.5* 8.0*   , CBC   Recent Labs   Lab 01/25/22 0427 01/25/22 0427 01/26/22 0427   WBC 5.15  --  4.15   HGB 8.9*  --  8.5*   HCT 29.0*   < > 27.6*     --  130*    < > = values in this interval not displayed.    and All labs within the past 24 hours have been reviewed    Microbiology Results (last 7 days)     Procedure Component Value Units Date/Time    Blood culture #2 **CANNOT BE ORDERED STAT** [[id number]] Collected: 01/16/22 1130    Order Status: Completed Specimen: Blood from Peripheral, Antecubital, Right Updated: 01/21/22 1432     Blood Culture, Routine No growth after 5 days.    Blood culture #1 **CANNOT BE ORDERED STAT** [[id number]] Collected: 01/16/22 1115    Order Status: Completed Specimen: Blood from Peripheral, Antecubital, Right Updated: 01/21/22 1232     Blood Culture, Routine No growth after 5 days.    Urine culture [[id number]] Collected: 01/18/22 0331    Order Status: Completed Specimen: Urine Updated: 01/20/22 0928     Urine Culture, Routine No growth    Narrative:      Specimen Source->Urine        X-Ray Chest 1 View   Final Result      FL " Fluoro For Venous Access   Final Result      FL Flouro Usage   Final Result      US Hemodialysis Access   Final Result      CT Abdomen Pelvis With Contrast   Final Result      Severe right hydroureteronephrosis; with right ureteral stent in place and 6 mm calculus within the mid right ureter.      Right perinephric stranding and urothelial enhancement suggestive of pyelonephritis/urinary tract infection.  Please correlate with urinalysis.      Bilateral nephrolithiasis.      Stable intrahepatic and extrahepatic bile duct dilation, status post cholecystectomy.  Please correlate with bilirubin levels.      Moderate volume gas and fecal material within the colon suggesting constipation.      Extensive aortoiliac atherosclerosis.         Electronically signed by: Justino Rivera MD   Date:    01/16/2022   Time:    12:39      X-Ray Chest AP Portable   Final Result      Cardiomegaly.  No acute pulmonary pathology.         Electronically signed by: Justino Rivera MD   Date:    01/16/2022   Time:    12:07          No results found for this or any previous visit.      Consultants and Procedures   Consultants:  Vascular Surgery  Infectious Disease  Nephrology  Urology    Procedures:   Right ureteral stent exchange  Tunneled dialysis access placement    Discharge Information:   Diet:  Resume regular diet/Cardiac diet/Diabetic Diet    Physical Activity:  Activity as tolerated    Instructions:  1. Take all medications as prescribed  2. Keep all follow-up appointments  3. Return to the hospital or call your primary care physicians if any worsening symptoms occur.    Follow-Up Appointments:  1. Please call your primary care physician to schedule an appointment in 1 week time.  2. Follow up with DR. Barber in 6 months  3. Follow up with DR. Neal in 2 weeks  4. Follow up with Dr. Dietz per regular dialysis schedule.     Pending laboratory work/Tests to be performed/followed by the Primary care Physician:    The patient was  discharged in the care of her parents//wife/family/caregiver, with discharge instructions were reviewed in written and verbal form. All pertinent questions were discussed and prescriptions were provided. The importance of making follow up appointments and compliance of medications has been stressed repeatedly. The patient will follow up in 1 week or sooner as needed with the PCP, and the patient is on board with the plan. Upon discharge, patient needs to be on following medications.    Discharge Medications:     Medication List      START taking these medications    cephALEXin 500 MG capsule  Commonly known as: KEFLEX  Take 1 capsule (500 mg total) by mouth every 8 (eight) hours. for 7 days     fluconazole 100 MG tablet  Commonly known as: DIFLUCAN  Take 1 tablet (100 mg total) by mouth every evening for 7 days     HYDROcodone-acetaminophen 5-325 mg per tablet  Commonly known as: NORCO  Take 1 tablet by mouth every 6 (six) hours as needed for Pain.     miconazole 2 % cream  Commonly known as: MICOTIN  Apply topically 2 (two) times daily.     QUESTRAN AND AQUAPHOR TOPICAL COMPOUND  Apply to affected area twice daily        CHANGE how you take these medications    promethazine 25 MG tablet  Commonly known as: PHENERGAN  TAKE 1 TABLET(25 MG) BY MOUTH TWICE DAILY AS NEEDED FOR NAUSEA  What changed: See the new instructions.        CONTINUE taking these medications    AURYXIA 210 mg iron Tab  Generic drug: ferric citrate     CHOLESTYRAMINE LIGHT 4 gram Pwpk  Generic drug: cholestyramine-aspartame     cyclobenzaprine 10 MG tablet  Commonly known as: FLEXERIL  Take 1 tablet (10 mg total) by mouth 3 (three) times daily as needed for Muscle spasms.     diltiaZEM 120 MG Cp24  Commonly known as: CARDIZEM CD  Take 1 capsule (120 mg total) by mouth once daily.     diphenhydrAMINE 25 mg capsule  Commonly known as: BENADRYL     gabapentin 100 MG capsule  Commonly known as: NEURONTIN     levothyroxine 88 MCG tablet  Commonly  known as: SYNTHROID  Take 1 tablet (88 mcg total) by mouth before breakfast.     sertraline 50 MG tablet  Commonly known as: ZOLOFT        STOP taking these medications    doxycycline 100 MG Cap  Commonly known as: VIBRAMYCIN     traMADoL 50 mg tablet  Commonly known as: ULTRAM           Where to Get Your Medications      These medications were sent to [facility] [address]  [address], [place] [facility] [address]    Phone: [phone]   · miconazole 2 % cream     These medications were sent to [facility]  [address], [place] [address]    Hours: Mon-Fri, 8a-5:30p Phone: [phone]   · cephALEXin 500 MG capsule  · fluconazole 100 MG tablet  · HYDROcodone-acetaminophen 5-325 mg per tablet     Information about where to get these medications is not yet available    Ask your nurse or doctor about these medications  · QUESTRAN AND AQUAPHOR TOPICAL COMPOUND           I spent 52 minutes preparing the discharge including reviewing records from previous encounters, preparation of discharge summary, assessing and final examination of the patient, discharge medicine reconciliation, discussing plan of care, follow up and education and prescriptions.       Shemar Hernandez  [facility] Hospitalist  01/26/2022

## 2022-01-26 NOTE — PLAN OF CARE
Problem: Adult Inpatient Plan of Care  Goal: Plan of Care Review  Outcome: Ongoing, Progressing  Goal: Patient-Specific Goal (Individualized)  Outcome: Ongoing, Progressing  Goal: Absence of Hospital-Acquired Illness or Injury  Outcome: Ongoing, Progressing  Goal: Optimal Comfort and Wellbeing  Outcome: Ongoing, Progressing  Goal: Readiness for Transition of Care  Outcome: Ongoing, Progressing     Problem: Adjustment to Illness (Sepsis/Septic Shock)  Goal: Optimal Coping  Outcome: Ongoing, Progressing     Problem: Bleeding (Sepsis/Septic Shock)  Goal: Absence of Bleeding  Outcome: Ongoing, Progressing     Problem: Glycemic Control Impaired (Sepsis/Septic Shock)  Goal: Blood Glucose Level Within Desired Range  Outcome: Ongoing, Progressing     Problem: Infection Progression (Sepsis/Septic Shock)  Goal: Absence of Infection Signs and Symptoms  Outcome: Ongoing, Progressing     Problem: Nutrition Impaired (Sepsis/Septic Shock)  Goal: Optimal Nutrition Intake  Outcome: Ongoing, Progressing     Problem: Fluid and Electrolyte Imbalance (Acute Kidney Injury/Impairment)  Goal: Fluid and Electrolyte Balance  Outcome: Ongoing, Progressing     Problem: Oral Intake Inadequate (Acute Kidney Injury/Impairment)  Goal: Optimal Nutrition Intake  Outcome: Ongoing, Progressing     Problem: Renal Function Impairment (Acute Kidney Injury/Impairment)  Goal: Effective Renal Function  Outcome: Ongoing, Progressing     Problem: Fluid Imbalance (Pneumonia)  Goal: Fluid Balance  Outcome: Ongoing, Progressing     Problem: Infection (Pneumonia)  Goal: Resolution of Infection Signs and Symptoms  Outcome: Ongoing, Progressing     Problem: Respiratory Compromise (Pneumonia)  Goal: Effective Oxygenation and Ventilation  Outcome: Ongoing, Progressing     Problem: Fall Injury Risk  Goal: Absence of Fall and Fall-Related Injury  Outcome: Ongoing, Progressing     Problem: Device-Related Complication Risk (Hemodialysis)  Goal: Safe, Effective Therapy  Delivery  Outcome: Ongoing, Progressing     Problem: Hemodynamic Instability (Hemodialysis)  Goal: Effective Tissue Perfusion  Outcome: Ongoing, Progressing     Problem: Infection (Hemodialysis)  Goal: Absence of Infection Signs and Symptoms  Outcome: Ongoing, Progressing     Problem: Impaired Wound Healing  Goal: Optimal Wound Healing  Outcome: Ongoing, Progressing     Problem: Malnutrition  Goal: Improved Nutritional Intake  Outcome: Ongoing, Progressing     Problem: Infection  Goal: Absence of Infection Signs and Symptoms  Outcome: Ongoing, Progressing

## 2022-01-26 NOTE — NURSING
Returned from dialysis and some am meds given, discharge instruction given and verbalized understanding, IV and tele removed. Patient transferred to [facility] for [facility] .

## 2022-01-26 NOTE — DISCHARGE INSTRUCTIONS
"Patient Education       Dialysis Catheter Discharge Instructions   About this topic   A dialysis catheter is a special kind of catheter that is used for dialysis that has two ends or lumens. People with kidney failure need dialysis, which is a treatment to get rid of harmful wastes and extra fluid from your blood. The dialysis machine takes a small amount of blood at a time to "clean" it and sends it back into your body. A dialysis catheter lets your blood flow out to the dialysis machine through one end or lumen and returns the "clean" blood to your body through the other end or lumen.  A dialysis catheter is tunneled under your skin and goes into a large blood vessel that leads to your heart. This gives a direct path into your blood. There are two shorter ends or tubes that are on the outside of your body and connect to the dialysis machine.  You may need a dialysis catheter until a fistula is ready to use for dialysis. A fistula is a pathway that is made to directly connect an artery and vein. Other times, you may use your dialysis catheter for a long time.  What care is needed at home?   · Ask your doctor what you need to do when you go home. Make sure you ask questions if you do not understand what the doctor says. This way you will know what you need to do.  · Do not let anyone except a dialysis nurse give you IV fluids or drugs through your dialysis catheter.  · The staff will teach you how to take care of the dialysis catheter and bandage at home. Be sure to ask any questions you have about the line and taking care of it. You may need help from a friend or family member.  · Wash your hands with soap and water for at least 30 seconds before touching your bandage or dialysis catheter. Do not let anyone touch your bandages or dialysis catheter with dirty hands.  · Wear sterile gloves if you need to touch the area.  · Ask your doctor how often you should change the dressing on your dialysis catheter. Learn how " to put on a new dressing.  · Make sure that the caps and clamps of the dialysis catheter are closed at all times.  · Ask your doctor when it is safe to bathe or shower. Keep the bandages covered so they do not get wet.  · Do not try to remove the dialysis catheter by yourself or have anyone else pull on it. It must be taken out by specially-trained staff.  · Keep sharp objects away from your catheter. Never use scissors around your dialysis catheter.  · Avoid wearing clothes that could pull or bend the line.  What follow-up care is needed?   Your doctor may ask you to make visits to the office to check on your progress. Be sure to keep these visits.  What drugs may be needed?   The doctor may order drugs to:  · Fight an infection  · Treat your condition  · Prevent blood clots  What problems could happen?   · Infection  · Bleeding from the skin or end of the catheter  · Blood clots in your vein or at the end of the catheter that can cause a blockage  · Break or crack in the catheter  · Leakage of drugs outside the vein, under the skin or fat tissues  · Air in the blood  · Abnormal heartbeat  · Collapsed lung  · Problem with blood flow during dialysis  When do I need to call the doctor?   · Signs of infection. These include a fever of 100.4°F (38°C) or higher, chills.  · Redness, yellowish drainage, warmth, stinging, or pain at the catheter site  · Problems with the catheter like:  ? Catheter falls out all the way or part of the way  ? Breaks, cracks, or leaks in the catheter  ? Not able to get the drugs or fluids through the catheter  ? Not able to get a blood return from your catheter  ? You have any concerns about your catheter  · Sudden shortness of breath or chest pain  · Swelling on the face, neck, or chest on the side of the line  · Swelling or bulging veins around the catheter site  Teach Back: Helping You Understand   The Teach Back Method helps you understand the information we are giving you. After you talk  with the staff, tell them in your own words what you learned. This helps to make sure the staff has described each thing clearly. It also helps to explain things that may have been confusing. Before going home, make sure you can do these:  · I can tell you about my procedure.  · I can tell you what I should do before I touch my central line or its bandage.  · I can tell you what I will do if I have redness or drainage from the catheter site.  Where can I learn more?   [employer] [place] [employer] and [employer]est[employer] and Kidney [employer]  https://www.niddk.nih.gov/health-information/kidney-disease/kidney-failure/hemodialysis   Last Reviewed Date   2020-04-06  Consumer Information Use and Disclaimer   This information is not specific medical advice and does not replace information you receive from your health care provider. This is only a brief summary of general information. It does NOT include all information about conditions, illnesses, injuries, tests, procedures, treatments, therapies, discharge instructions or life-style choices that may apply to you. You must talk with your health care provider for complete information about your health and treatment options. This information should not be used to decide whether or not to accept your health care providers advice, instructions or recommendations. Only your health care provider has the knowledge and training to provide advice that is right for you.  Copyright   Copyright © 2021 [employer] and its affiliates and/or licensors. All rights reserved.

## 2022-02-08 PROBLEM — T82.858A STENOSIS OF OTHER VASCULAR PROSTHETIC DEVICES, IMPLANTS AND GRAFTS, INITIAL ENCOUNTER: Status: ACTIVE | Noted: 2022-01-01

## 2022-02-08 PROBLEM — N25.81 SECONDARY HYPERPARATHYROIDISM OF RENAL ORIGIN: Status: ACTIVE | Noted: 2022-01-01

## 2022-02-08 NOTE — PROGRESS NOTES
SUBJECTIVE:    Patient ID: Shara Hutchins is a 71 y.o. female.    Chief Complaint: hospital f/u    HPI    Admit Date: 1/16/22   Discharge Date: 1/26/22     # Vulvar Cellulitis  - treated with broad antibiotics and wound care. She had the best relief with antibiotics and with application of aquaphor and questran poultice. She will be discharged to continue fluconazole and keflex with plans to follow with Dr. Neal in 2 weeks. She says she feels comfortable with ongoing care with the poultice. A prescription for the combination aquaphor and questran was sent to her pharmacy. She will continue for 14 days on keflex and fluconazole.      # Hydroureteronephrosis  # Nephrolithiasis  - she underwent cystoscopy with ureteral stent exchange with Dr. Barber on 01/18. She will follow up with Dr. Barber in 6 months.      # AV Fistula Malfunction  - her dialysis access began to malfunction. After discussion with her nephrology providers and vascular surgery, it was decided to let her fistula rest. She has had tunneled dialysis access placed and will continue her regular dialysis through this access at discharge. She will follow up with her nephrologists as directed at discharge.      Discharge Facility: Hospital    Medication Reconciliation:  Medications changed/added/deleted.     Medication List             START taking these medications          cephALEXin 500 MG capsule  Commonly known as: KEFLEX  Take 1 capsule (500 mg total) by mouth every 8 (eight) hours. for 7 days      fluconazole 100 MG tablet  Commonly known as: DIFLUCAN  Take 1 tablet (100 mg total) by mouth every evening for 7 days      HYDROcodone-acetaminophen 5-325 mg per tablet  Commonly known as: NORCO  Take 1 tablet by mouth every 6 (six) hours as needed for Pain.      miconazole 2 % cream  Commonly known as: MICOTIN  Apply topically 2 (two) times daily.      QUESTRAN AND AQUAPHOR TOPICAL COMPOUND  Apply to affected area twice daily                      CHANGE  how you take these medications          promethazine 25 MG tablet  Commonly known as: PHENERGAN  TAKE 1 TABLET(25 MG) BY MOUTH TWICE DAILY AS NEEDED FOR NAUSEA  What changed: See the new instructions.                      CONTINUE taking these medications          AURYXIA 210 mg iron Tab  Generic drug: ferric citrate      CHOLESTYRAMINE LIGHT 4 gram Pwpk  Generic drug: cholestyramine-aspartame      cyclobenzaprine 10 MG tablet  Commonly known as: FLEXERIL  Take 1 tablet (10 mg total) by mouth 3 (three) times daily as needed for Muscle spasms.      diltiaZEM 120 MG Cp24  Commonly known as: CARDIZEM CD  Take 1 capsule (120 mg total) by mouth once daily.      diphenhydrAMINE 25 mg capsule  Commonly known as: BENADRYL      gabapentin 100 MG capsule  Commonly known as: NEURONTIN      levothyroxine 88 MCG tablet  Commonly known as: SYNTHROID  Take 1 tablet (88 mcg total) by mouth before breakfast.      sertraline 50 MG tablet  Commonly known as: ZOLOFT                      STOP taking these medications    doxycycline 100 MG Cap  Commonly known as: VIBRAMYCIN      traMADoL 50 mg tablet  Commonly known as: ULTRAM                    New Prescriptions filled after discharge: yes  Discharge summary reviewed:  yes  Pending test results at discharge reviewed:   no  Follow up appointments scheduled:  yes   with General Surgery ID Renal PCP  Follow up labs/tests ordered:   yes  Home Health ordered on discharge:   no  Home Health company name: NA  DME ordered at discharge:   no  How patient is feeling since discharge from the hospital?  Patient is feeling very fatigued-she is very anemic- she will not be given blood in dialysis- she is very short of breath also     Labs-A1C4.6       No results displayed because visit has over 200 results.      Admission on 09/15/2021, Discharged on 09/16/2021   Component Date Value Ref Range Status    WBC 09/15/2021 6.71  3.90 - 12.70 K/uL Final    RBC 09/15/2021 2.31* 4.00 - 5.40 M/uL Final     Hemoglobin 09/15/2021 8.4* 12.0 - 16.0 g/dL Final    Hematocrit 09/15/2021 26.0* 37.0 - 48.5 % Final    MCV 09/15/2021 113* 82 - 98 fL Final    MCH 09/15/2021 36.4* 27.0 - 31.0 pg Final    MCHC 09/15/2021 32.3  32.0 - 36.0 g/dL Final    RDW 09/15/2021 16.1* 11.5 - 14.5 % Final    Platelets 09/15/2021 179  150 - 450 K/uL Final    MPV 09/15/2021 10.0  9.2 - 12.9 fL Final    Immature Granulocytes 09/15/2021 0.3  0.0 - 0.5 % Final    Gran # (ANC) 09/15/2021 5.2  1.8 - 7.7 K/uL Final    Immature Grans (Abs) 09/15/2021 0.02  0.00 - 0.04 K/uL Final    Lymph # 09/15/2021 0.8* 1.0 - 4.8 K/uL Final    Mono # 09/15/2021 0.5  0.3 - 1.0 K/uL Final    Eos # 09/15/2021 0.1  0.0 - 0.5 K/uL Final    Baso # 09/15/2021 0.01  0.00 - 0.20 K/uL Final    nRBC 09/15/2021 0  0 /100 WBC Final    Gran % 09/15/2021 78.0* 38.0 - 73.0 % Final    Lymph % 09/15/2021 12.5* 18.0 - 48.0 % Final    Mono % 09/15/2021 7.5  4.0 - 15.0 % Final    Eosinophil % 09/15/2021 1.6  0.0 - 8.0 % Final    Basophil % 09/15/2021 0.1  0.0 - 1.9 % Final    Differential Method 09/15/2021 Automated   Final    Sodium 09/15/2021 141  136 - 145 mmol/L Final    Potassium 09/15/2021 3.0* 3.5 - 5.1 mmol/L Final    Chloride 09/15/2021 99  95 - 110 mmol/L Final    CO2 09/15/2021 28  23 - 29 mmol/L Final    Glucose 09/15/2021 123* 70 - 110 mg/dL Final    BUN 09/15/2021 13  8 - 23 mg/dL Final    Creatinine 09/15/2021 2.6* 0.5 - 1.4 mg/dL Final    Calcium 09/15/2021 8.2* 8.7 - 10.5 mg/dL Final    Total Protein 09/15/2021 6.2  6.0 - 8.4 g/dL Final    Albumin 09/15/2021 3.2* 3.5 - 5.2 g/dL Final    Total Bilirubin 09/15/2021 0.7  0.1 - 1.0 mg/dL Final    Alkaline Phosphatase 09/15/2021 99  55 - 135 U/L Final    AST 09/15/2021 15  10 - 40 U/L Final    ALT 09/15/2021 8* 10 - 44 U/L Final    Anion Gap 09/15/2021 14  8 - 16 mmol/L Final    eGFR if  09/15/2021 20.6* >60 mL/min/1.73 m^2 Final    eGFR if non African American 09/15/2021  17.9* >60 mL/min/1.73 m^2 Final    Magnesium 09/15/2021 1.4* 1.6 - 2.6 mg/dL Final    aPTT 09/15/2021 30.6  25.6 - 35.8 sec Final    PT 09/15/2021 16.2* 11.8 - 14.3 sec Final    INR 09/15/2021 1.4   Final    Group & Rh 09/15/2021 A POS   Final    Indirect Bruno 09/15/2021 NEG   Final    SARS-CoV-2 RNA, Amplification, Qual 09/15/2021 Negative  Negative Final    UNIT NUMBER 09/15/2021 [id number]   Final    Product Code 09/15/2021 [id number]   Final    DISPENSE STATUS 09/15/2021 TRANSFUSED   Final    CODING SYSTEM 09/15/2021 [id number]   Final    Unit Blood Type Code 09/15/2021 6200   Final    Unit Blood Type 09/15/2021 A POS   Final    Unit Expiration 09/15/2021 [id number]   Final    Troponin I 09/15/2021 <0.030  <=0.040 ng/mL Final    Troponin I 09/16/2021 <0.030  <=0.040 ng/mL Final    WBC 09/16/2021 6.09  3.90 - 12.70 K/uL Final    RBC 09/16/2021 2.48* 4.00 - 5.40 M/uL Final    Hemoglobin 09/16/2021 9.0* 12.0 - 16.0 g/dL Final    Hematocrit 09/16/2021 28.2* 37.0 - 48.5 % Final    MCV 09/16/2021 114* 82 - 98 fL Final    MCH 09/16/2021 36.3* 27.0 - 31.0 pg Final    MCHC 09/16/2021 31.9* 32.0 - 36.0 g/dL Final    RDW 09/16/2021 18.1* 11.5 - 14.5 % Final    Platelets 09/16/2021 158  150 - 450 K/uL Final    MPV 09/16/2021 9.9  9.2 - 12.9 fL Final    Immature Granulocytes 09/16/2021 0.5  0.0 - 0.5 % Final    Gran # (ANC) 09/16/2021 4.4  1.8 - 7.7 K/uL Final    Immature Grans (Abs) 09/16/2021 0.03  0.00 - 0.04 K/uL Final    Lymph # 09/16/2021 1.1  1.0 - 4.8 K/uL Final    Mono # 09/16/2021 0.5  0.3 - 1.0 K/uL Final    Eos # 09/16/2021 0.1  0.0 - 0.5 K/uL Final    Baso # 09/16/2021 0.02  0.00 - 0.20 K/uL Final    nRBC 09/16/2021 0  0 /100 WBC Final    Gran % 09/16/2021 71.5  38.0 - 73.0 % Final    Lymph % 09/16/2021 17.2* 18.0 - 48.0 % Final    Mono % 09/16/2021 8.5  4.0 - 15.0 % Final    Eosinophil % 09/16/2021 2.0  0.0 - 8.0 % Final    Basophil % 09/16/2021 0.3  0.0 - 1.9 % Final     Differential Method 09/16/2021 Automated   Final    Sodium 09/16/2021 141  136 - 145 mmol/L Final    Potassium 09/16/2021 3.8  3.5 - 5.1 mmol/L Final    Chloride 09/16/2021 100  95 - 110 mmol/L Final    CO2 09/16/2021 30* 23 - 29 mmol/L Final    Glucose 09/16/2021 78  70 - 110 mg/dL Final    BUN 09/16/2021 22  8 - 23 mg/dL Final    Creatinine 09/16/2021 3.6* 0.5 - 1.4 mg/dL Final    Calcium 09/16/2021 8.0* 8.7 - 10.5 mg/dL Final    Total Protein 09/16/2021 5.8* 6.0 - 8.4 g/dL Final    Albumin 09/16/2021 3.0* 3.5 - 5.2 g/dL Final    Total Bilirubin 09/16/2021 0.7  0.1 - 1.0 mg/dL Final    Alkaline Phosphatase 09/16/2021 96  55 - 135 U/L Final    AST 09/16/2021 11  10 - 40 U/L Final    ALT 09/16/2021 9* 10 - 44 U/L Final    Anion Gap 09/16/2021 11  8 - 16 mmol/L Final    eGFR if  09/16/2021 13.9* >60 mL/min/1.73 m^2 Final    eGFR if non  09/16/2021 12.1* >60 mL/min/1.73 m^2 Final       Past Medical History:   Diagnosis Date    Cellulitis of trunk     Perineum, numerous episodes    Chronic diarrhea 02/10/2020    Short-gut syndrome    Diabetes mellitus, type 2     pt has not had for years after losing weight    End stage renal disease on dialysis 01/07/2018    Fatigue     Hypertension     Hypothyroidism 2/10/2020    Iron deficiency anemia due to chronic blood loss 1/7/2018    Pulmonary hypertension 3/20/2020    Vulvar cancer 1/7/2019     Past Surgical History:   Procedure Laterality Date    ABDOMINAL SURGERY      ANGIOGRAPHY OF ARTERIOVENOUS SHUNT N/A 1/19/2022    Procedure: Fistulogram with Possible Intervention;  Surgeon: Raz Maher MD;  [facility] CATH[facility]EP LAB;  Service: Cardiothoracic;  Laterality: N/A;    APPENDECTOMY      AV FISTULA PLACEMENT Left 2018    BACK SURGERY      BLADDER FULGURATION N/A 6/16/2020    Procedure: FULGURATION, BLADDER;  Surgeon: Damari Barber Jr., MD;  Location: [facility];  Service: Urology;  Laterality: N/A;     carpel tunnel surgery once on each hand      CERVICAL FUSION      x 4    cervix repair      CHOLECYSTECTOMY  2000    COLECTOMY      CYSTOSCOPY W/ RETROGRADES  6/16/2020    Procedure: CYSTOSCOPY, WITH RETROGRADE PYELOGRAM;  Surgeon: Damari Barber Jr., MD;  Location: [facility] OR;  Service: Urology;;    CYSTOSCOPY W/ RETROGRADES Right 4/20/2021    Procedure: CYSTOSCOPY, WITH RETROGRADE PYELOGRAM;  Surgeon: Damari Barber Jr., MD;  Location: [facility] OR;  Service: Urology;  Laterality: Right;    CYSTOSCOPY W/ URETERAL STENT PLACEMENT Right 3/16/2021    Procedure: CYSTOSCOPY, WITH URETERAL STENT INSERTION;  Surgeon: Damari Barber Jr., MD;  Location: [facility] OR;  Service: Urology;  Laterality: Right;    CYSTOSCOPY W/ URETERAL STENT PLACEMENT Right 5/18/2021    Procedure: CYSTOSCOPY, WITH URETERAL STENT INSERTION;  Surgeon: Damari Barber Jr., MD;  Location: [facility] OR;  Service: Urology;  Laterality: Right;    CYSTOSCOPY W/ URETERAL STENT REMOVAL Right 4/20/2021    Procedure: CYSTOSCOPY, WITH URETERAL STENT REMOVAL;  Surgeon: Damari Barber Jr., MD;  Location: [facility] OR;  Service: Urology;  Laterality: Right;    CYSTOSCOPY WITH URETEROSCOPY, RETROGRADE PYELOGRAPHY, AND INSERTION OF STENT Right 3/30/2021    Procedure: CYSTOSCOPY, WITH RETROGRADE PYELOGRAM AND URETERAL STENT INSERTION;  Surgeon: Damari Barber Jr., MD;  Location: [facility] OR;  Service: Urology;  Laterality: Right;    CYSTOURETEROSCOPY WITH RETROGRADE PYELOGRAPHY AND INSERTION OF STENT INTO URETER Right 1/18/2022    Procedure: CYSTOURETEROSCOPY, WITH RETROGRADE PYELOGRAM AND URETERAL STENT INSERTION;  Surgeon: Damari Barber Jr., MD;  Location: [facility];  Service: Urology;  Laterality: Right;    DIAGNOSTIC LAPAROSCOPY N/A 2/13/2020    Procedure: LAPAROSCOPY, DIAGNOSTIC;  Surgeon: Robbi Lovell III, MD;  Location: [facility];  Service: General;  Laterality: N/A;    HYSTERECTOMY  1980    ILEOSTOMY N/A 2/13/2020    Procedure: CREATION, ILEOSTOMY Loop;  Surgeon:  Robbi Lovell III, MD;  Location: [facility] OR;  Service: General;  Laterality: N/A;    ILEOSTOMY CLOSURE N/A 5/18/2020    Procedure: CLOSURE, ILEOSTOMY;  Surgeon: Robbi Lovell III, MD;  Location: [facility] OR;  Service: General;  Laterality: N/A;    LASER LITHOTRIPSY Right 3/30/2021    Procedure: LITHOTRIPSY, USING LASER;  Surgeon: Damari Barber Jr., MD;  Location: [facility] OR;  Service: Urology;  Laterality: Right;    LUMBAR LAMINECTOMY      LYSIS OF ADHESIONS N/A 2/13/2020    Procedure: LYSIS, ADHESIONS;  Surgeon: Robbi Lovell III, MD;  Location: [facility] OR;  Service: General;  Laterality: N/A;    NECK SURGERY      PERCUTANEOUS TRANSLUMINAL ANGIOPLASTY OF ARTERIOVENOUS FISTULA N/A 1/19/2022    Procedure: PTA, AV FISTULA;  Surgeon: Raz Maher MD;  Location: [facility] CATH/EP LAB;  Service: Cardiothoracic;  Laterality: N/A;    PHLEBOGRAPHY  2/28/2020    Procedure: VENOGRAM;  Surgeon: Robbi Lovell III, MD;  Location: [facility] OR;  Service: General;;    REMOVAL OF VASCULAR ACCESS PORT Right 2/13/2020    Procedure: REMOVAL, VASCULAR ACCESS PORT;  Surgeon: Robbi Lovell III, MD;  Location: [facility] OR;  Service: General;  Laterality: Right;    RETROGRADE PYELOGRAPHY  3/16/2021    Procedure: PYELOGRAM, RETROGRADE;  Surgeon: Damari Barber Jr., MD;  Location: [facility] OR;  Service: Urology;;    RETROGRADE PYELOGRAPHY Right 5/18/2021    Procedure: PYELOGRAM, RETROGRADE;  Surgeon: Damari Barber Jr., MD;  Location: [facility] OR;  Service: Urology;  Laterality: Right;    SHOULDER SURGERY      URETEROSCOPIC REMOVAL OF URETERIC CALCULUS Right 3/30/2021    Procedure: REMOVAL, CALCULUS, URETER, URETEROSCOPIC;  Surgeon: Damari Barber Jr., MD;  Location: [facility] OR;  Service: Urology;  Laterality: Right;    URETEROSCOPIC REMOVAL OF URETERIC CALCULUS Right 4/20/2021    Procedure: REMOVAL, CALCULUS, URETER, URETEROSCOPIC;  Surgeon: Damari Barber Jr., MD;  Location: [facility] OR;  Service: Urology;  Laterality: Right;    vulva  cancer       Family History   Problem Relation Age of Onset    Heart disease Mother     Hypertension Mother     Cancer Mother         lung    Heart disease Father     Heart disease Sister         Open heart surgery    No Known Problems Daughter        Marital Status:   Alcohol History:  reports no history of alcohol use.  Tobacco History:  reports that she quit smoking about 22 years ago. Her smoking use included cigarettes. She has a 33.00 pack-year smoking history. She quit smokeless tobacco use about 22 years ago.  Drug History:  reports no history of drug use.    Review of patient's allergies indicates:   Allergen Reactions    Ciprofloxacin Other (See Comments)     Elevated liver enzymes      Pcn [penicillins] Anaphylaxis     TOLERATES ROCEPHIN WITHOUT DIFFICULTY       Current Outpatient Medications:     cholestyramine/aspartame (QUESTRAN AND AQUAPHOR TOPICAL COMPOUND), Apply to affected area twice daily, Disp: 1 Container, Rfl: 1    cyclobenzaprine (FLEXERIL) 10 MG tablet, Take 1 tablet (10 mg total) by mouth 3 (three) times daily as needed for Muscle spasms., Disp: 30 tablet, Rfl: 1    diltiaZEM (CARDIZEM CD) 120 MG Cp24, Take 1 capsule (120 mg total) by mouth once daily., Disp: 90 capsule, Rfl: 0    diphenhydrAMINE (BENADRYL) 25 mg capsule, Take 25 mg by mouth every 12 (twelve) hours as needed for Itching., Disp: , Rfl:     gabapentin (NEURONTIN) 100 MG capsule, Take 100 mg by mouth 2 (two) times daily. HCS, Disp: , Rfl:     levothyroxine (SYNTHROID) 88 MCG tablet, Take 1 tablet (88 mcg total) by mouth before breakfast., Disp: 90 tablet, Rfl: 0    miconazole (MICOTIN) 2 % cream, Apply topically 2 (two) times daily., Disp: 28 g, Rfl: 0    promethazine (PHENERGAN) 25 MG tablet, TAKE 1 TABLET(25 MG) BY MOUTH TWICE DAILY AS NEEDED FOR NAUSEA (Patient taking differently: Take 25 mg by mouth 2 (two) times daily as needed for Nausea.), Disp: 60 tablet, Rfl: 0    sertraline (ZOLOFT) 50 MG  tablet, Take 50 mg by mouth every evening., Disp: , Rfl:     ferric citrate (AURYXIA) 210 mg iron Tab, Take 2 tablets by mouth 3 (three) times daily with meals., Disp: , Rfl:     hydrOXYzine HCL (ATARAX) 10 MG Tab, Take 1 tablet (10 mg total) by mouth 3 (three) times daily as needed., Disp: 15 tablet, Rfl: 2  No current facility-administered medications for this visit.    Facility-Administered Medications Ordered in Other Visits:     electrolyte-S (ISOLYTE), , Intravenous, Continuous, Raz Ruff MD    lactated ringers infusion, , Intravenous, Continuous, Ruy Beltre MD    LIDOcaine (PF) 10 mg/ml (1%) injection 5 mg, 0.5 mL, Intradermal, Once, Raz Ruff MD    Review of Systems   Constitutional: Positive for fatigue. Negative for appetite change, chills, diaphoresis, fever and unexpected weight change.   HENT: Positive for congestion (sinuses constantly draining clear mucous) and postnasal drip. Negative for ear pain, hearing loss, nosebleeds, sinus pressure, sinus pain, sneezing, sore throat, tinnitus, trouble swallowing and voice change.    Eyes: Negative for photophobia, pain, itching and visual disturbance.   Respiratory: Negative for apnea, cough, chest tightness, shortness of breath, wheezing and stridor.    Cardiovascular: Negative for chest pain, palpitations and leg swelling.   Gastrointestinal: Positive for diarrhea (chronic). Negative for abdominal distention, abdominal pain, blood in stool, constipation, nausea and vomiting.   Endocrine: Negative for cold intolerance, heat intolerance, polydipsia and polyuria.   Genitourinary: Negative for difficulty urinating, dyspareunia, dysuria, flank pain, frequency, hematuria, menstrual problem, pelvic pain, urgency, vaginal discharge and vaginal pain.   Musculoskeletal: Negative for arthralgias, back pain, joint swelling, myalgias, neck pain and neck stiffness.   Skin: Positive for wound. Negative for pallor.   Allergic/Immunologic:  "Negative for environmental allergies and food allergies.   Neurological: Positive for light-headedness. Negative for dizziness, tremors, speech difficulty, weakness and numbness.   Hematological: Does not bruise/bleed easily.   Psychiatric/Behavioral: Negative for agitation, confusion, decreased concentration, sleep disturbance and suicidal ideas. The patient is not nervous/anxious.         Objective:      Vitals:    02/08/22 1346   BP: 120/68   Pulse: 74   Resp: 12   Temp: 98.8 °F (37.1 °C)   SpO2: 97%   Weight: 64.4 kg (142 lb)   Height: 5' 7.5" (1.715 m)     Physical Exam  Vitals and nursing note reviewed.   Constitutional:       Appearance: Normal appearance. She is not ill-appearing.      Comments: Very pale   HENT:      Head: Normocephalic and atraumatic.   Eyes:      Extraocular Movements: Extraocular movements intact.      Pupils: Pupils are equal, round, and reactive to light.   Cardiovascular:      Rate and Rhythm: Normal rate and regular rhythm.      Pulses: Normal pulses.      Heart sounds: Normal heart sounds.   Pulmonary:      Effort: Pulmonary effort is normal.      Breath sounds: Normal breath sounds.   Abdominal:      General: Bowel sounds are normal.      Palpations: Abdomen is soft.   Musculoskeletal:      Cervical back: Normal range of motion and neck supple.      Right lower leg: No edema.      Left lower leg: No edema.   Skin:     General: Skin is warm and dry.   Neurological:      Mental Status: She is alert and oriented to person, place, and time. Mental status is at baseline.   Psychiatric:         Mood and Affect: Mood normal.         Behavior: Behavior normal.         Thought Content: Thought content normal.         Assessment:       1. Encounter for osteoporosis screening in asymptomatic postmenopausal patient    2. Pallor    3. Iron deficiency anemia, unspecified iron deficiency anemia type    4. Menopause    5. Encounter for screening mammogram for breast cancer    6. End-stage renal " disease    7. Post-nasal drip         Plan:       Encounter for osteoporosis screening in asymptomatic postmenopausal patient  -     DXA Bone Density Spine And Hip; Future; Expected date: 02/08/2022    Pallor    Iron deficiency anemia, unspecified iron deficiency anemia type    Menopause  -     DXA Bone Density Spine And Hip; Future; Expected date: 02/08/2022    Encounter for screening mammogram for breast cancer  -     Mammo Digital Screening Bilat w/ Daniel; Future; Expected date: 06/03/2022    End-stage renal disease    Post-nasal drip  -     hydrOXYzine HCL (ATARAX) 10 MG Tab; Take 1 tablet (10 mg total) by mouth 3 (three) times daily as needed.  Dispense: 15 tablet; Refill: 2      Follow up in about 3 months (around 5/8/2022) for FOLLOW UP LABS, FOLLOW-UP STATUS, FOLLOW UP MEDICATIONS.

## 2022-02-11 NOTE — TELEPHONE ENCOUNTER
The following medication needs a prior authorization:     Medication Name: Hydroxyzine     Dosage: 10 mg    Frequency: 3 times a day    Directions for use: 3 times a day as needed    Diagnosis: Post nasal drip R09.82    Is the request for a reauthorization? no    Is the patient currently stable on therapy? Has not started    Please list all therapeutic alternatives previously used with start/end dates and outcome: Diphenhyramine 25 mg every 12 hours as needed started 12/8/2020 not helping

## 2022-04-23 PROBLEM — O34.70: Status: ACTIVE | Noted: 2022-01-01

## 2022-04-23 PROBLEM — O34.70: Status: RESOLVED | Noted: 2022-01-01 | Resolved: 2022-01-01

## 2022-04-23 NOTE — PROGRESS NOTES
Pharmacist Renal Dose Adjustment Note    Shara Hutchins is a 72 y.o. female being treated with the medication Enoxaparin.    Recent Labs   Lab 04/23/22  1055   CREATININE 4.4*     Serum creatinine: 4.4 mg/dL (H) 04/23/22 1055  Estimated creatinine clearance: 11.5 mL/min (A)    Enoxaparin 40 mg subq every 24 hours will be changed to Enoxaparin 30 mg subq every 24 hours due to CrCl < 30 ml/min.    Pharmacist's Name: Natasha Zuluaga  Pharmacist's Extension: [phone]

## 2022-04-23 NOTE — ED NOTES
To er with c/o perineal pain, redness, swelling. Pt states onset x 3 weeks. She has had same symptoms in past. No relief of symptoms with otc treatments. No fever. + redness, swelling, tenderness, noted with p.a. exam. Speech clear. Aaox4.

## 2022-04-23 NOTE — PROGRESS NOTES
Pharmacokinetic Initial Assessment: IV Vancomycin    Assessment/Plan:    Initiate intravenous vancomycin with loading dose of 1000 mg once with subsequent doses when random concentrations are less than 20 mcg/mL  Desired empiric serum trough concentration is 10 to 15 mcg/mL  Draw vancomycin random level on 4/24/22 at 15:00..  Pharmacy will continue to follow and monitor vancomycin.      Please contact pharmacy at extension [phone] with any questions regarding this assessment.     Thank you for the consult,   Natasha Zuluaga       Patient brief summary:  Shara Hutchins is a 72 y.o. female initiated on antimicrobial therapy with IV Vancomycin for treatment of suspected skin & soft tissue infection    Drug Allergies:   Review of patient's allergies indicates:   Allergen Reactions    Ciprofloxacin Other (See Comments)     Elevated liver enzymes      Pcn [penicillins] Anaphylaxis     TOLERATES ROCEPHIN WITHOUT DIFFICULTY       Actual Body Weight:   64.4 kg    Renal Function:   Estimated Creatinine Clearance: 11.5 mL/min (A) (based on SCr of 4.4 mg/dL (H)).,     Dialysis Method (if applicable): HD MWF    CBC (last 72 hours):  Recent Labs   Lab Result Units 04/23/22  1055   WBC K/uL 5.55   Hemoglobin g/dL 10.6*   Hematocrit % 33.2*   Platelets K/uL 121*   Gran % % 62.0   Lymph % % 27.2   Mono % % 8.1   Eosinophil % % 2.3   Basophil % % 0.2   Differential Method  Automated       Metabolic Panel (last 72 hours):  Recent Labs   Lab Result Units 04/23/22  1055   Sodium mmol/L 143   Potassium mmol/L 4.7   Chloride mmol/L 110   CO2 mmol/L 24   Glucose mg/dL 70   BUN mg/dL 15   Creatinine mg/dL 4.4*   Albumin g/dL 3.2*   Total Bilirubin mg/dL 0.7   Alkaline Phosphatase U/L 85   AST U/L 19   ALT U/L 10       Drug levels (last 3 results):  No results for input(s): VANCOMYCINRA, VANCOMYCINPE, VANCOMYCINTR in the last 72 hours.    Microbiologic Results:  Microbiology Results (last 7 days)       ** No results found for the last 168 hours. **

## 2022-04-23 NOTE — DISCHARGE INSTRUCTIONS
You vit b12 was low and you are to take daily injections of 1000mcg once daily for 5 more days then 1000mcg once weekly for 4 more weeks.  Pls follow up with your PCP.  Your blood pressure was also uncontrolled and we have started you on hydralazine.  Complete your oral antibiotics and follow up with Dr Neal within a week

## 2022-04-23 NOTE — ASSESSMENT & PLAN NOTE
Patient with end-stage renal disease on dialysis Monday Wednesday Friday last had dialysis yesterday  Monitoring

## 2022-04-23 NOTE — HPI
Shara Hutchins is a 72 year old female  presents to  ED with vulvar cellulitis that is recurrent. Last episode 1/2022 . Required hospitalization  The patient states that about  2 weeks ago she developed pain and itching. She took keflex but symptoms have worsened   She has  hx vulvar cancer S/P  radiation and has since had recurrent episodes of cellulitis   Patient also w/DM 2 , ESRD MWF , HTN hypothyroid .  Patient states she has had increased pain., chronic diarrhea, no fever no chills no chest pain no shortness of breath.

## 2022-04-23 NOTE — ED PROVIDER NOTES
Encounter Date: 4/23/2022       History     Chief Complaint   Patient presents with    Vaginal Pain     X 3 WEEKS, STATES VAGINAL  PAIN AND SWELLING     Patient is a 72yoF who presents for perineal cellulitis; pertinent PMHx HTN, ESRD on HD (MWF), short-gut syndrome with chronic diarrhea, h/o vulvar cancer s/p radiation with multiple episodes cellulitis of perineum, pHTN. Patient experiences recurrences of vulvar cellulitis several times per year. She felt another episode coming on several weeks ago, and filled her PRN Rx Keflex by her ID provider. She has taken keflex for 3 weeks with worsening pain and infection of perineum. This region is so swollen, it causes urine and feces to go up her back when she eliminates.  Associated with nausea without vomiting.  Denies fever, chills, abdominal pain, dysuria, confusion.  She makes urine.  Last dialyzed yesterday, normal amount.  The patients available PMH, PSH, Social History, medications, allergies, and triage vital signs were reviewed just prior to their medical evaluation.  A ten point review of systems was completed and is negative except as documented above.  Patient denies any other acute medical complaint.    Please be advised this text was dictated with [employer] software and may contain errors due to translation.           Review of patient's allergies indicates:   Allergen Reactions    Ciprofloxacin Other (See Comments)     Elevated liver enzymes      Pcn [penicillins] Anaphylaxis     TOLERATES ROCEPHIN WITHOUT DIFFICULTY     Past Medical History:   Diagnosis Date    Cellulitis of trunk     Perineum, numerous episodes    Chronic diarrhea 02/10/2020    Short-gut syndrome    Diabetes mellitus, type 2     pt has not had for years after losing weight    End stage renal disease on dialysis 01/07/2018    Fatigue     Hypertension     Hypothyroidism 2/10/2020    Iron deficiency anemia due to chronic blood loss 1/7/2018    Pulmonary hypertension 3/20/2020     Vulvar cancer 1/7/2019     Past Surgical History:   Procedure Laterality Date    ABDOMINAL SURGERY      ANGIOGRAPHY OF ARTERIOVENOUS SHUNT N/A 1/19/2022    Procedure: Fistulogram with Possible Intervention;  Surgeon: Raz Maher MD;  Location: [facility] CATH/EP LAB;  Service: Cardiothoracic;  Laterality: N/A;    APPENDECTOMY      AV FISTULA PLACEMENT Left 2018    BACK SURGERY      BLADDER FULGURATION N/A 6/16/2020    Procedure: FULGURATION, BLADDER;  Surgeon: Damari Barber Jr., MD;  Location: [facility] OR;  Service: Urology;  Laterality: N/A;    carpel tunnel surgery once on each hand      CERVICAL FUSION      x 4    cervix repair      CHOLECYSTECTOMY  2000    COLECTOMY      CYSTOSCOPY W/ RETROGRADES  6/16/2020    Procedure: CYSTOSCOPY, WITH RETROGRADE PYELOGRAM;  Surgeon: Damari Barber Jr., MD;  Location: [facility] OR;  Service: Urology;;    CYSTOSCOPY W/ RETROGRADES Right 4/20/2021    Procedure: CYSTOSCOPY, WITH RETROGRADE PYELOGRAM;  Surgeon: Damari Barber Jr., MD;  Location: [facility] OR;  Service: Urology;  Laterality: Right;    CYSTOSCOPY W/ URETERAL STENT PLACEMENT Right 3/16/2021    Procedure: CYSTOSCOPY, WITH URETERAL STENT INSERTION;  Surgeon: Damari Barber Jr., MD;  Location: [facility] OR;  Service: Urology;  Laterality: Right;    CYSTOSCOPY W/ URETERAL STENT PLACEMENT Right 5/18/2021    Procedure: CYSTOSCOPY, WITH URETERAL STENT INSERTION;  Surgeon: Damari Barber Jr., MD;  Location: [facility] OR;  Service: Urology;  Laterality: Right;    CYSTOSCOPY W/ URETERAL STENT REMOVAL Right 4/20/2021    Procedure: CYSTOSCOPY, WITH URETERAL STENT REMOVAL;  Surgeon: Damari Barber Jr., MD;  Location: [facility] OR;  Service: Urology;  Laterality: Right;    CYSTOSCOPY WITH URETEROSCOPY, RETROGRADE PYELOGRAPHY, AND INSERTION OF STENT Right 3/30/2021    Procedure: CYSTOSCOPY, WITH RETROGRADE PYELOGRAM AND URETERAL STENT INSERTION;  Surgeon: Damari Barber Jr., MD;  Location: [facility] OR;  Service: Urology;  Laterality: Right;     CYSTOURETEROSCOPY WITH RETROGRADE PYELOGRAPHY AND INSERTION OF STENT INTO URETER Right 1/18/2022    Procedure: CYSTOURETEROSCOPY, WITH RETROGRADE PYELOGRAM AND URETERAL STENT INSERTION;  Surgeon: Damari Barber Jr., MD;  Location: [facility] OR;  Service: Urology;  Laterality: Right;    DIAGNOSTIC LAPAROSCOPY N/A 2/13/2020    Procedure: LAPAROSCOPY, DIAGNOSTIC;  Surgeon: Robbi Lovell III, MD;  Location: [facility] OR;  Service: General;  Laterality: N/A;    HYSTERECTOMY  1980    ILEOSTOMY N/A 2/13/2020    Procedure: CREATION, ILEOSTOMY Loop;  Surgeon: Robbi Lovell III, MD;  Location: [facility] OR;  Service: General;  Laterality: N/A;    ILEOSTOMY CLOSURE N/A 5/18/2020    Procedure: CLOSURE, ILEOSTOMY;  Surgeon: Robbi Lovell III, MD;  Location: [facility] OR;  Service: General;  Laterality: N/A;    LASER LITHOTRIPSY Right 3/30/2021    Procedure: LITHOTRIPSY, USING LASER;  Surgeon: Damari Barber Jr., MD;  Location: [facility] OR;  Service: Urology;  Laterality: Right;    LUMBAR LAMINECTOMY      LYSIS OF ADHESIONS N/A 2/13/2020    Procedure: LYSIS, ADHESIONS;  Surgeon: Robbi Lovell III, MD;  Location: [facility] OR;  Service: General;  Laterality: N/A;    NECK SURGERY      PERCUTANEOUS TRANSLUMINAL ANGIOPLASTY OF ARTERIOVENOUS FISTULA N/A 1/19/2022    Procedure: PTA, AV FISTULA;  Surgeon: Raz Maher MD;  Location: [facility] CATH/EP LAB;  Service: Cardiothoracic;  Laterality: N/A;    PHLEBOGRAPHY  2/28/2020    Procedure: VENOGRAM;  Surgeon: Robbi Lovell III, MD;  Location: [facility] OR;  Service: General;;    REMOVAL OF VASCULAR ACCESS PORT Right 2/13/2020    Procedure: REMOVAL, VASCULAR ACCESS PORT;  Surgeon: Robbi Lovell III, MD;  Location: [facility] OR;  Service: General;  Laterality: Right;    RETROGRADE PYELOGRAPHY  3/16/2021    Procedure: PYELOGRAM, RETROGRADE;  Surgeon: Damari Barber Jr., MD;  Location: [facility] OR;  Service: Urology;;    RETROGRADE PYELOGRAPHY Right 5/18/2021    Procedure: PYELOGRAM,  RETROGRADE;  Surgeon: Damari Barber Jr., MD;  Location: [facility] OR;  Service: Urology;  Laterality: Right;    SHOULDER SURGERY      URETEROSCOPIC REMOVAL OF URETERIC CALCULUS Right 3/30/2021    Procedure: REMOVAL, CALCULUS, URETER, URETEROSCOPIC;  Surgeon: Damari Barber Jr., MD;  Location: [facility] OR;  Service: Urology;  Laterality: Right;    URETEROSCOPIC REMOVAL OF URETERIC CALCULUS Right 2021    Procedure: REMOVAL, CALCULUS, URETER, URETEROSCOPIC;  Surgeon: Damari Barber Jr., MD;  Location: [facility] OR;  Service: Urology;  Laterality: Right;    vulva cancer       Family History   Problem Relation Age of Onset    Heart disease Mother     Hypertension Mother     Cancer Mother         lung    Heart disease Father     Heart disease Sister         Open heart surgery    No Known Problems Daughter      Social History     Tobacco Use    Smoking status: Former Smoker     Packs/day: 1.00     Years: 33.00     Pack years: 33.00     Types: Cigarettes     Quit date: 2000     Years since quittin.3    Smokeless tobacco: Former User     Quit date:    Substance Use Topics    Alcohol use: No    Drug use: No     Review of Systems   Constitutional: Negative for chills and fever.   HENT: Negative for sore throat and trouble swallowing.    Respiratory: Negative for shortness of breath.    Cardiovascular: Negative for chest pain.   Gastrointestinal: Positive for nausea. Negative for abdominal pain and vomiting.   Genitourinary: Positive for vaginal pain (swelling). Negative for dysuria and flank pain.   Musculoskeletal: Negative for back pain.   Skin: Positive for color change. Negative for rash.   Neurological: Negative for dizziness, seizures, weakness and headaches.   Hematological: Does not bruise/bleed easily.       Physical Exam     Initial Vitals [22 0925]   BP Pulse Resp Temp SpO2   (!) 155/82 86 18 98.2 °F (36.8 °C) 99 %      MAP       --         Physical Exam    Nursing note and vitals  reviewed.  Constitutional: She appears well-developed and well-nourished. She is not diaphoretic. No distress (nontoxic appearing).   HENT:   Head: Normocephalic and atraumatic.   Right Ear: External ear normal.   Left Ear: External ear normal.   Mouth/Throat: Oropharynx is clear and moist.   Eyes: Conjunctivae and EOM are normal.   Cardiovascular: Normal rate, regular rhythm and intact distal pulses.   Murmur heard.  Pulmonary/Chest: Breath sounds normal. No respiratory distress. She has no wheezes. She has no rhonchi. She has no rales.   Abdominal: Abdomen is soft. Bowel sounds are normal. There is no abdominal tenderness. There is no rebound and no guarding.   Genitourinary:    Genitourinary Comments: Perineum covered in desitin    External labia, vaginal orifice, perineum down to perianal space with significant tenderness, induration, warmth. +violaceous. Small area of desquamation R labia    No fluctuance appreciated     Musculoskeletal:         General: No edema. Normal range of motion.     Neurological: She is alert and oriented to person, place, and time. She has normal strength. No cranial nerve deficit or sensory deficit.   Skin: Skin is warm and dry. Capillary refill takes less than 2 seconds. No rash noted. No erythema. No pallor.   AVF LUE thrill and bruit   Psychiatric: She has a normal mood and affect. Her behavior is normal. Judgment and thought content normal.         ED Course   Procedures  Labs Reviewed   CBC W/ AUTO DIFFERENTIAL - Abnormal; Notable for the following components:       Result Value    RBC 2.86 (*)     Hemoglobin 10.6 (*)     Hematocrit 33.2 (*)      (*)     MCH 37.1 (*)     MCHC 31.9 (*)     RDW 15.3 (*)     Platelets 121 (*)     All other components within normal limits   COMPREHENSIVE METABOLIC PANEL - Abnormal; Notable for the following components:    Creatinine 4.4 (*)     Calcium 8.0 (*)     Albumin 3.2 (*)     eGFR if  10.8 (*)     eGFR if non   American 9.4 (*)     All other components within normal limits   LACTIC ACID, PLASMA   PROCALCITONIN          Imaging Results          CT Abdomen Pelvis With Contrast (Final result)  Result time 04/23/22 12:56:58   Procedure changed from CT Pelvis With Contrast     Final result by Rashi Wilder MD (04/23/22 12:56:58)                 Narrative:    CMS MANDATED QUALITY DATA - CT RADIATION  436    All CT scans at this facility utilize dose modulation, iterative reconstruction, and/or weight based dosing when appropriate to reduce radiation dose to as low as reasonably achievable.    CLINICAL HISTORY:  72 years (1950) Female Soft tissue infection suspected, pelvis, xray done; h/o recurrent perineal cellulitis, worsening despite abx Original order was a Pelvis with, changed to abd/pelvis due to abnormal gas pattern    TECHNIQUE:  CT ABDOMEN PELVIS WITH IV CONTRAST. 416 images obtained. Axial CT images of the abdomen and pelvis were obtained from the dome of the diaphragm to the proximal thigh.    CONTRAST:  100 mL of IV Omni 350 was administered uneventfully by IV    COMPARISON:  Most recent CT from January 16, 2022.    FINDINGS:.  Lower Thorax:  The lungs are essentially clear noting mild bilateral dependent atelectasis versus scarring. There is no pleural or pericardial effusion. There are scattered coronary arterial calcifications.    CT Abdomen:  Liver: The liver is normal in size and imaging appearance.  Gallbladder: Cholecystectomy.  Biliary Tree: Mild central intrahepatic and extrahepatic biliary ductal dilation with a transition point in the periampullary CBD, consider correlation with liver function tests and for history of Ellis mean. This is unchanged when compared to the previous exam.  Spleen: Within normal limits.  Pancreas: The pancreas is normal.  Adrenal Glands: The adrenal glands appear within normal limits.  Kidneys: Right-sided double-J ureteral stent in expected configuration with  proximal coil projecting of the renal pelvis and distal coil projecting of the bladder. There is no hydronephrosis or hydroureter. There is a 6 x 5 mm triangle or stone projecting along the mid ureter, and additional 5 x 4 mm stone projecting along the distal right ureter. There are scattered bilateral additional nonobstructing renal stones with calcification along the medullary. Mid compatible with medullary nephrocalcinosis.  Vasculature: There are scattered atheromatous mural plaques in the aorta and its major branch vessels with no aneurysm seen.  Lymph nodes: No abdominal lymphadenopathy is seen.  Intraperitoneal structures: There is no ascites.  Bowel: Large volume of stool and gas seen in the colon with a nonobstructive bowel gas pattern, consider correlation for constipation.  Abdominal wall: The abdominal wall and musculature are normal.  Musculoskeletal: There is degenerative disc disease and facet arthropathy in the lumbar spine with no aggressive appearing lytic or blastic lesions .    CT Pelvis:  Bladder: The bladder is partially decompressed. That being said, there is mild circumferential bladder wall thickening. In the appropriate clinical setting this may relate to a mild degree of infection or inflammation; consider correlation with urinalysis.  Reproductive Organs: The uterus is not visualized/surgically absent. There is nonspecific thickening of the vaginal wall.  Pelvic Lymph nodes: No pelvic lymphadenopathy or pelvic mass is identified.    IMPRESSION:  1. Large volume of stool and gas in the colon with a nonobstructive bowel gas pattern, consider correlation for constipation.  2. Nonspecific thickening of the vulvovaginal skin suggestive of cellulitis, consider correlation with physical exam. No finding of abscess or gas forming infection (necrotizing fasciitis/Iman's gangrene).  3. Circumferential bladder wall thickening, consider correlation with urinalysis for cystitis.  4. Right-sided  double-J ureteral stent in expected configuration without hydronephrosis.  5. Stones in the right mid and distal ureter.  6. Numerous additional bilateral nonobstructing renal stones, and findings compatible with medullary nephrocalcinosis.  7. Unchanged mild central intrahepatic and extrahepatic biliary ductal dilation.  8. Additional and incidental findings as above, similar to the previous exam.              .    Electronically signed by:  Rashi Wilder MD  4/23/2022 12:56 PM CDT Workstation: [id number]                               Medications   cyclobenzaprine tablet 10 mg (has no administration in time range)   diltiaZEM 24 hr capsule 120 mg (has no administration in time range)   ferric citrate Tab 2 tablet (has no administration in time range)   gabapentin capsule 100 mg (has no administration in time range)   levothyroxine tablet 88 mcg (has no administration in time range)   rOPINIRole tablet 0.25 mg (0.25 mg Oral Given 4/23/22 1538)   sertraline tablet 50 mg (has no administration in time range)   acetaminophen tablet 650 mg (has no administration in time range)   glucose chewable tablet 16 g (has no administration in time range)   glucose chewable tablet 24 g (has no administration in time range)   dextrose 50% injection 12.5 g (has no administration in time range)   dextrose 50% injection 25 g (has no administration in time range)   glucagon (human recombinant) injection 1 mg (has no administration in time range)   ondansetron injection 4 mg (4 mg Intravenous Given 4/23/22 1736)   vancomycin - pharmacy to dose (has no administration in time range)   cefTRIAXone (ROCEPHIN) 1 g/50 mL D5W IVPB (0 g Intravenous Stopped 4/23/22 1603)   morphine injection 2 mg (2 mg Intravenous Given 4/23/22 2040)   enoxaparin injection 30 mg (30 mg Subcutaneous Given 4/23/22 1721)   clindamycin in D5W 900 mg/50 mL IVPB 900 mg (0 mg Intravenous Stopped 4/23/22 1200)   morphine injection 4 mg (4 mg Intravenous Given 4/23/22  1057)   iodixanoL (VISIPAQUE 320) injection 100 mL (100 mLs Intravenous Given 4/23/22 1232)   vancomycin in dextrose 5 % 1 gram/250 mL IVPB 1,000 mg (1,000 mg Intravenous New Bag 4/23/22 1721)     Medical Decision Making:   History:   Old Medical Records: I decided to obtain old medical records.  Old Records Summarized: records from clinic visits and records from previous admission(s).  Initial Assessment:   Patient with recurrent perineal cellulitis presents for recurrence of symptoms, failed 3 weeks of Keflex at home, nausea, no other systemic symptoms, VSS, afebrile  Differential Diagnosis:   DDx cellulitis, abscess, Iman's. Physical exam and history taking lower clinical suspicion for sepsis, septic shock  Clinical Tests:   Lab Tests: Ordered and Reviewed  Radiological Study: Ordered and Reviewed             ED Course as of 04/23/22 2131   Sat Apr 23, 2022   1207 Hemoglobin(!): 10.6  Increased from prior [MF]   1207 Platelets(!): 121  Same as prior [MF]   1207 Lactate, Karlos: 1.3 [MF]   1207 Calcium(!): 8.0  8.5 corrected [MF]   1303 CT Abdomen Pelvis With Contrast  No evidence of gas producing infection to perineum.     Findings suggestive of diarrheal illness (chronic)    Bladder wall thickening, UA ordered [MF]   1308 Recommend admission for IV antibiotics given worsening cellulitis despite outpatient meds. Patient agreed to plan of care and voiced understanding.   [MF]      ED Course User Index  [MF] LEWIS Lemus PA-C  04/23/2022    I discussed the following case, diagnosis and plan of care with attending physician.      Clinical Impression:   Final diagnoses:  [L03.818] Cellulitis of other specified site (Primary)  [Z85.44] History of cancer of vulva          ED Disposition Condition    Observation               Hellen Culp PA-C  04/23/22 2132

## 2022-04-23 NOTE — SUBJECTIVE & OBJECTIVE
Past Medical History:   Diagnosis Date    Cellulitis of trunk     Perineum, numerous episodes    Chronic diarrhea 02/10/2020    Short-gut syndrome    Diabetes mellitus, type 2     pt has not had for years after losing weight    End stage renal disease on dialysis 01/07/2018    Fatigue     Hypertension     Hypothyroidism 2/10/2020    Iron deficiency anemia due to chronic blood loss 1/7/2018    Pulmonary hypertension 3/20/2020    Vulvar cancer 1/7/2019       Past Surgical History:   Procedure Laterality Date    ABDOMINAL SURGERY      ANGIOGRAPHY OF ARTERIOVENOUS SHUNT N/A 1/19/2022    Procedure: Fistulogram with Possible Intervention;  Surgeon: Raz Maher MD;  Location: [facility] CATH/EP LAB;  Service: Cardiothoracic;  Laterality: N/A;    APPENDECTOMY      AV FISTULA PLACEMENT Left 2018    BACK SURGERY      BLADDER FULGURATION N/A 6/16/2020    Procedure: FULGURATION, BLADDER;  Surgeon: Damari Barber Jr., MD;  Location: [facility] OR;  Service: Urology;  Laterality: N/A;    carpel tunnel surgery once on each hand      CERVICAL FUSION      x 4    cervix repair      CHOLECYSTECTOMY  2000    COLECTOMY      CYSTOSCOPY W/ RETROGRADES  6/16/2020    Procedure: CYSTOSCOPY, WITH RETROGRADE PYELOGRAM;  Surgeon: Damari Barber Jr., MD;  Location: [facility] OR;  Service: Urology;;    CYSTOSCOPY W/ RETROGRADES Right 4/20/2021    Procedure: CYSTOSCOPY, WITH RETROGRADE PYELOGRAM;  Surgeon: Damari Barber Jr., MD;  Location: [facility] OR;  Service: Urology;  Laterality: Right;    CYSTOSCOPY W/ URETERAL STENT PLACEMENT Right 3/16/2021    Procedure: CYSTOSCOPY, WITH URETERAL STENT INSERTION;  Surgeon: Damari Barber Jr., MD;  Location: [facility] OR;  Service: Urology;  Laterality: Right;    CYSTOSCOPY W/ URETERAL STENT PLACEMENT Right 5/18/2021    Procedure: CYSTOSCOPY, WITH URETERAL STENT INSERTION;  Surgeon: Damari Barber Jr., MD;  Location: [facility] OR;  Service: Urology;  Laterality: Right;    CYSTOSCOPY W/ URETERAL STENT REMOVAL Right 4/20/2021     Procedure: CYSTOSCOPY, WITH URETERAL STENT REMOVAL;  Surgeon: Damari Barber Jr., MD;  Location: [facility] OR;  Service: Urology;  Laterality: Right;    CYSTOSCOPY WITH URETEROSCOPY, RETROGRADE PYELOGRAPHY, AND INSERTION OF STENT Right 3/30/2021    Procedure: CYSTOSCOPY, WITH RETROGRADE PYELOGRAM AND URETERAL STENT INSERTION;  Surgeon: Damari Barber Jr., MD;  Location: [facility] OR;  Service: Urology;  Laterality: Right;    CYSTOURETEROSCOPY WITH RETROGRADE PYELOGRAPHY AND INSERTION OF STENT INTO URETER Right 1/18/2022    Procedure: CYSTOURETEROSCOPY, WITH RETROGRADE PYELOGRAM AND URETERAL STENT INSERTION;  Surgeon: Damari Barber Jr., MD;  Location: [facility] OR;  Service: Urology;  Laterality: Right;    DIAGNOSTIC LAPAROSCOPY N/A 2/13/2020    Procedure: LAPAROSCOPY, DIAGNOSTIC;  Surgeon: Robbi Lovell III, MD;  Location: [facility] OR;  Service: General;  Laterality: N/A;    HYSTERECTOMY  1980    ILEOSTOMY N/A 2/13/2020    Procedure: CREATION, ILEOSTOMY Loop;  Surgeon: Robbi Lovell III, MD;  Location: [facility] OR;  Service: General;  Laterality: N/A;    ILEOSTOMY CLOSURE N/A 5/18/2020    Procedure: CLOSURE, ILEOSTOMY;  Surgeon: Robbi Lovell III, MD;  Location: [facility] OR;  Service: General;  Laterality: N/A;    LASER LITHOTRIPSY Right 3/30/2021    Procedure: LITHOTRIPSY, USING LASER;  Surgeon: Damari Barber Jr., MD;  Location: [facility] OR;  Service: Urology;  Laterality: Right;    LUMBAR LAMINECTOMY      LYSIS OF ADHESIONS N/A 2/13/2020    Procedure: LYSIS, ADHESIONS;  Surgeon: Robbi Lovell III, MD;  Location: [facility] OR;  Service: General;  Laterality: N/A;    NECK SURGERY      PERCUTANEOUS TRANSLUMINAL ANGIOPLASTY OF ARTERIOVENOUS FISTULA N/A 1/19/2022    Procedure: PTA, AV FISTULA;  Surgeon: Raz Maher MD;  Location: [facility] CATH/EP LAB;  Service: Cardiothoracic;  Laterality: N/A;    PHLEBOGRAPHY  2/28/2020    Procedure: VENOGRAM;  Surgeon: Robbi Lovell III, MD;  Location: [facility] OR;  Service: General;;    REMOVAL  OF VASCULAR ACCESS PORT Right 2/13/2020    Procedure: REMOVAL, VASCULAR ACCESS PORT;  Surgeon: Robbi Lovell III, MD;  Location: [facility] OR;  Service: General;  Laterality: Right;    RETROGRADE PYELOGRAPHY  3/16/2021    Procedure: PYELOGRAM, RETROGRADE;  Surgeon: Damari Barber Jr., MD;  Location: [facility] OR;  Service: Urology;;    RETROGRADE PYELOGRAPHY Right 5/18/2021    Procedure: PYELOGRAM, RETROGRADE;  Surgeon: Damari Barber Jr., MD;  Location: [facility] OR;  Service: Urology;  Laterality: Right;    SHOULDER SURGERY      URETEROSCOPIC REMOVAL OF URETERIC CALCULUS Right 3/30/2021    Procedure: REMOVAL, CALCULUS, URETER, URETEROSCOPIC;  Surgeon: Damari Barber Jr., MD;  Location: [facility] OR;  Service: Urology;  Laterality: Right;    URETEROSCOPIC REMOVAL OF URETERIC CALCULUS Right 4/20/2021    Procedure: REMOVAL, CALCULUS, URETER, URETEROSCOPIC;  Surgeon: Damari Barber Jr., MD;  Location: [facility] OR;  Service: Urology;  Laterality: Right;    vulva cancer         Review of patient's allergies indicates:   Allergen Reactions    Ciprofloxacin Other (See Comments)     Elevated liver enzymes      Pcn [penicillins] Anaphylaxis     TOLERATES ROCEPHIN WITHOUT DIFFICULTY       Current Facility-Administered Medications on File Prior to Encounter   Medication    electrolyte-S (ISOLYTE)    lactated ringers infusion    LIDOcaine (PF) 10 mg/ml (1%) injection 5 mg     Current Outpatient Medications on File Prior to Encounter   Medication Sig    acetaminophen (TYLENOL EXTRA STRENGTH) 500 MG tablet Take 1,000 mg by mouth daily as needed for Pain.    cyclobenzaprine (FLEXERIL) 10 MG tablet TAKE ONE TABLET BY MOUTH THREE TIMES DAILY AS NEEDED FOR MUSCLE SPASMS    diltiaZEM (CARDIZEM CD) 120 MG Cp24 TAKE 1 CAPSULE DAILY    diphenhydrAMINE (BENADRYL) 25 mg capsule Take 25 mg by mouth every 12 (twelve) hours as needed for Itching.    ferric citrate (AURYXIA) 210 mg iron Tab Take 2 tablets by mouth 3 (three) times daily with meals.    gabapentin  (NEURONTIN) 100 MG capsule Take 100 mg by mouth 2 (two) times daily. HCS    hydrOXYzine HCL (ATARAX) 10 MG Tab Take 1 tablet (10 mg total) by mouth 3 (three) times daily as needed.    levothyroxine (SYNTHROID) 88 MCG tablet TAKE 1 TABLET BEFORE BREAKFAST    promethazine (PHENERGAN) 25 MG tablet TAKE 1 TABLET(25 MG) BY MOUTH TWICE DAILY AS NEEDED FOR NAUSEA (Patient taking differently: Take 25 mg by mouth 2 (two) times daily as needed for Nausea.)    rOPINIRole (REQUIP) 0.25 MG tablet Take 0.25 mg by mouth 3 (three) times daily. 1 tablet in the morning and 2 tablets at night    sertraline (ZOLOFT) 50 MG tablet Take 1 tablet (50 mg total) by mouth every evening. Take 50 mg by mouth every evening.    cefUROXime (CEFTIN) 250 MG tablet Take 250 mg by mouth 2 (two) times daily.    cholestyramine/aspartame (QUESTRAN AND AQUAPHOR TOPICAL COMPOUND) Apply to affected area twice daily    doxycycline (VIBRA-TABS) 100 MG tablet Take 100 mg by mouth 2 (two) times daily.    HYDROcodone-acetaminophen (NORCO) 5-325 mg per tablet Take 1 tablet by mouth every 8 (eight) hours as needed for Pain.    miconazole (MICOTIN) 2 % cream Apply topically 2 (two) times daily.     Family History       Problem Relation (Age of Onset)    Cancer Mother    Heart disease Mother, Father, Sister    Hypertension Mother    No Known Problems Daughter          Tobacco Use    Smoking status: Former Smoker     Packs/day: 1.00     Years: 33.00     Pack years: 33.00     Types: Cigarettes     Quit date: 2000     Years since quittin.3    Smokeless tobacco: Former User     Quit date:    Substance and Sexual Activity    Alcohol use: No    Drug use: No    Sexual activity: Not on file   Family history noncontributory to this admission.  Reviewed  Review of Systems comprehensive review of systems are as per HPI otherwise noncontributory  Objective:     Vital Signs (Most Recent):  Temp: 98.2 °F (36.8 °C) (22 0925)  Pulse: 75 (22 1125)  Resp: 17  (04/23/22 1057)  BP: (!) 141/64 (04/23/22 1125)  SpO2: 96 % (04/23/22 1125)   Vital Signs (24h Range):  Temp:  [98.2 °F (36.8 °C)] 98.2 °F (36.8 °C)  Pulse:  [71-86] 75  Resp:  [17-18] 17  SpO2:  [96 %-100 %] 96 %  BP: (141-156)/(64-82) 141/64     Weight: 64.4 kg (142 lb)  Body mass index is 21.91 kg/m².    Physical Exam      Patient appears in no acute distress.  Lying in a gurney in the emergency department  HEENT sclerae nonicteric  Neck is supple nontender  Lungs clear to auscultation  Heart regular rate and rhythm  Abdomen is soft nontender NT  Extremities no edema no deformities   exam see photos.  No Godwin  Skin Cellulitis at superior aspect of ulna extending to perineum and gluteal folds.  Marked erythema  Neuro patient is alert oriented x3 motor exam is nonfocal    Significant Labs: All pertinent labs within the past 24 hours have been reviewed.  BMP:   Recent Labs   Lab 04/23/22  1055   GLU 70      K 4.7      CO2 24   BUN 15   CREATININE 4.4*   CALCIUM 8.0*     CBC:   Recent Labs   Lab 04/23/22  1055   WBC 5.55   HGB 10.6*   HCT 33.2*   *     CMP:   Recent Labs   Lab 04/23/22  1055      K 4.7      CO2 24   GLU 70   BUN 15   CREATININE 4.4*   CALCIUM 8.0*   PROT 6.0   ALBUMIN 3.2*   BILITOT 0.7   ALKPHOS 85   AST 19   ALT 10   ANIONGAP 9   EGFRNONAA 9.4*         Significant Imaging:   CT Abdomen Pelvis With Contrast    Status: Final result         [facility] Results Release    [facility] Lab Status: Active  Results Release           PACS Images for [facility] Viewer     Show images for CT Abdomen Pelvis With Contrast                  CT Abdomen Pelvis With Contrast  Order: [id number]  Status: Final result    Visible to patient: Yes (not seen)    Next appt: 05/10/2022 at 01:20 PM in Family Medicine (April Horton MD)    0 Result Notes    Details    Reading Physician Reading Date Result Priority   Rashi Wilder MD  [phone] 4/23/2022      Narrative & Impression  CMS  MANDATED QUALITY DATA - CT RADIATION  436     All CT scans at this facility utilize dose modulation, iterative reconstruction, and/or weight based dosing when appropriate to reduce radiation dose to as low as reasonably achievable.     CLINICAL HISTORY:  72 years (1950) Female Soft tissue infection suspected, pelvis, xray done; h/o recurrent perineal cellulitis, worsening despite abx Original order was a Pelvis with, changed to abd/pelvis due to abnormal gas pattern     TECHNIQUE:  CT ABDOMEN PELVIS WITH IV CONTRAST. 416 images obtained. Axial CT images of the abdomen and pelvis were obtained from the dome of the diaphragm to the proximal thigh.     CONTRAST:  100 mL of IV Omni 350 was administered uneventfully by IV     COMPARISON:  Most recent CT from January 16, 2022.     FINDINGS:.  Lower Thorax:  The lungs are essentially clear noting mild bilateral dependent atelectasis versus scarring. There is no pleural or pericardial effusion. There are scattered coronary arterial calcifications.     CT Abdomen:  Liver: The liver is normal in size and imaging appearance.  Gallbladder: Cholecystectomy.  Biliary Tree: Mild central intrahepatic and extrahepatic biliary ductal dilation with a transition point in the periampullary CBD, consider correlation with liver function tests and for history of Ellis mean. This is unchanged when compared to the previous exam.  Spleen: Within normal limits.  Pancreas: The pancreas is normal.  Adrenal Glands: The adrenal glands appear within normal limits.  Kidneys: Right-sided double-J ureteral stent in expected configuration with proximal coil projecting of the renal pelvis and distal coil projecting of the bladder. There is no hydronephrosis or hydroureter. There is a 6 x 5 mm triangle or stone projecting along the mid ureter, and additional 5 x 4 mm stone projecting along the distal right ureter. There are scattered bilateral additional nonobstructing renal stones with  calcification along the medullary. Mid compatible with medullary nephrocalcinosis.  Vasculature: There are scattered atheromatous mural plaques in the aorta and its major branch vessels with no aneurysm seen.  Lymph nodes: No abdominal lymphadenopathy is seen.  Intraperitoneal structures: There is no ascites.  Bowel: Large volume of stool and gas seen in the colon with a nonobstructive bowel gas pattern, consider correlation for constipation.  Abdominal wall: The abdominal wall and musculature are normal.  Musculoskeletal: There is degenerative disc disease and facet arthropathy in the lumbar spine with no aggressive appearing lytic or blastic lesions .     CT Pelvis:  Bladder: The bladder is partially decompressed. That being said, there is mild circumferential bladder wall thickening. In the appropriate clinical setting this may relate to a mild degree of infection or inflammation; consider correlation with urinalysis.  Reproductive Organs: The uterus is not visualized/surgically absent. There is nonspecific thickening of the vaginal wall.  Pelvic Lymph nodes: No pelvic lymphadenopathy or pelvic mass is identified.     IMPRESSION:  1. Large volume of stool and gas in the colon with a nonobstructive bowel gas pattern, consider correlation for constipation.  2. Nonspecific thickening of the vulvovaginal skin suggestive of cellulitis, consider correlation with physical exam. No finding of abscess or gas forming infection (necrotizing fasciitis/Iman's gangrene).  3. Circumferential bladder wall thickening, consider correlation with urinalysis for cystitis.  4. Right-sided double-J ureteral stent in expected configuration without hydronephrosis.  5. Stones in the right mid and distal ureter.  6. Numerous additional bilateral nonobstructing renal stones, and findings compatible with medullary nephrocalcinosis.  7. Unchanged mild central intrahepatic and extrahepatic biliary ductal dilation.  8. Additional and  incidental findings as above, similar to the previous exam.

## 2022-04-23 NOTE — CONSULTS
INPATIENT NEPHROLOGY CONSULT   [facility]    Patient Name: Shara Hutchins  Date: 04/23/2022    Reason for consultation: ESRD    Chief Complaint:   Chief Complaint   Patient presents with    Vaginal Pain     X 3 WEEKS, STATES VAGINAL  PAIN AND SWELLING       History of Present Illness:  73 y/o F with ESRD on HD MWF, short-gut syndrome with chronic diarrhea, h/o vulvar cancer s/p radiation with multiple episodes cellulitis of perineum, and pHTN. Patient experiences recurrences of vulvar cellulitis several times per year. She felt another episode coming on several weeks ago, and filled her PRN Rx Keflex by her ID provider. She has taken keflex for 3 weeks with worsening pain and infection of perineum. This region is so swollen, it causes urine and feces to go up her back when she eliminates.  Associated with nausea without vomiting.  Denies fever, chills, abdominal pain, dysuria, confusion.  She makes urine. Denies exac/reliev factors. Consulted for dialysis.    Interval History:  4/23- no issues with HD yest- comfortable on exam, vitals stable, reports taking medication to prevent diarrhea, does endorse some urinary discomfort    CT AP  1. Large volume of stool and gas in the colon with a nonobstructive bowel gas pattern, consider correlation for constipation.  2. Nonspecific thickening of the vulvovaginal skin suggestive of cellulitis, consider correlation with physical exam. No finding of abscess or gas forming infection (necrotizing fasciitis/Iman's gangrene).  3. Circumferential bladder wall thickening, consider correlation with urinalysis for cystitis.  4. Right-sided double-J ureteral stent in expected configuration without hydronephrosis.  5. Stones in the right mid and distal ureter.  6. Numerous additional bilateral nonobstructing renal stones, and findings compatible with medullary nephrocalcinosis.  7. Unchanged mild central intrahepatic and extrahepatic biliary ductal  dilation.    Plan of Care:    Assessment:  Vulvar cellulitis, UTI  ESRD on HD MWF  HTN  SHPT  Anemia of CKD    Plan:    - Check UA and UCx. Dose antbx for dialysis/CrCl < 10.  - Plan for HD MWF if still inpatient.  - BP/VS are at goal- will UF to dry weight.  - Check phos- advise renal diet. Resume binders with meals.  - Will give ADAM with HD.    Thank you for allowing us to participate in this patient's care. We will continue to follow.    Vital Signs:  Temp Readings from Last 3 Encounters:   04/23/22 98.2 °F (36.8 °C) (Oral)   02/08/22 98.8 °F (37.1 °C)   01/26/22 98 °F (36.7 °C)       Pulse Readings from Last 3 Encounters:   04/23/22 75   02/08/22 74   01/26/22 77       BP Readings from Last 3 Encounters:   04/23/22 (!) 141/64   02/08/22 120/68   01/26/22 136/73       Weight:  Wt Readings from Last 3 Encounters:   04/23/22 64.4 kg (142 lb)   02/08/22 64.4 kg (142 lb)   01/18/22 68.3 kg (150 lb 9.2 oz)       Past Medical & Surgical History:  Past Medical History:   Diagnosis Date    Cellulitis of trunk     Perineum, numerous episodes    Chronic diarrhea 02/10/2020    Short-gut syndrome    Diabetes mellitus, type 2     pt has not had for years after losing weight    End stage renal disease on dialysis 01/07/2018    Fatigue     Hypertension     Hypothyroidism 2/10/2020    Iron deficiency anemia due to chronic blood loss 1/7/2018    Pulmonary hypertension 3/20/2020    Vulvar cancer 1/7/2019       Past Surgical History:   Procedure Laterality Date    ABDOMINAL SURGERY      ANGIOGRAPHY OF ARTERIOVENOUS SHUNT N/A 1/19/2022    Procedure: Fistulogram with Possible Intervention;  Surgeon: Raz Maher MD;  Location: [facility] CATH/EP LAB;  Service: Cardiothoracic;  Laterality: N/A;    APPENDECTOMY      AV FISTULA PLACEMENT Left 2018    BACK SURGERY      BLADDER FULGURATION N/A 6/16/2020    Procedure: FULGURATION, BLADDER;  Surgeon: Damari Barber Jr., MD;  Location: [facility] OR;  Service: Urology;  Laterality:  N/A;    carpel tunnel surgery once on each hand      CERVICAL FUSION      x 4    cervix repair      CHOLECYSTECTOMY  2000    COLECTOMY      CYSTOSCOPY W/ RETROGRADES  6/16/2020    Procedure: CYSTOSCOPY, WITH RETROGRADE PYELOGRAM;  Surgeon: Damari Barber Jr., MD;  Location: [facility] OR;  Service: Urology;;    CYSTOSCOPY W/ RETROGRADES Right 4/20/2021    Procedure: CYSTOSCOPY, WITH RETROGRADE PYELOGRAM;  Surgeon: Damari Barber Jr., MD;  Location: [facility] OR;  Service: Urology;  Laterality: Right;    CYSTOSCOPY W/ URETERAL STENT PLACEMENT Right 3/16/2021    Procedure: CYSTOSCOPY, WITH URETERAL STENT INSERTION;  Surgeon: Damari Barber Jr., MD;  Location: [facility] OR;  Service: Urology;  Laterality: Right;    CYSTOSCOPY W/ URETERAL STENT PLACEMENT Right 5/18/2021    Procedure: CYSTOSCOPY, WITH URETERAL STENT INSERTION;  Surgeon: Damari Barber Jr., MD;  Location: [facility] OR;  Service: Urology;  Laterality: Right;    CYSTOSCOPY W/ URETERAL STENT REMOVAL Right 4/20/2021    Procedure: CYSTOSCOPY, WITH URETERAL STENT REMOVAL;  Surgeon: Damari Barber Jr., MD;  Location: [facility] OR;  Service: Urology;  Laterality: Right;    CYSTOSCOPY WITH URETEROSCOPY, RETROGRADE PYELOGRAPHY, AND INSERTION OF STENT Right 3/30/2021    Procedure: CYSTOSCOPY, WITH RETROGRADE PYELOGRAM AND URETERAL STENT INSERTION;  Surgeon: Damari Barber Jr., MD;  Location: [facility] OR;  Service: Urology;  Laterality: Right;    CYSTOURETEROSCOPY WITH RETROGRADE PYELOGRAPHY AND INSERTION OF STENT INTO URETER Right 1/18/2022    Procedure: CYSTOURETEROSCOPY, WITH RETROGRADE PYELOGRAM AND URETERAL STENT INSERTION;  Surgeon: Damari Barber Jr., MD;  Location: [facility];  Service: Urology;  Laterality: Right;    DIAGNOSTIC LAPAROSCOPY N/A 2/13/2020    Procedure: LAPAROSCOPY, DIAGNOSTIC;  Surgeon: Robbi Lovell III, MD;  Location: [facility];  Service: General;  Laterality: N/A;    HYSTERECTOMY  1980    ILEOSTOMY N/A 2/13/2020    Procedure: CREATION, ILEOSTOMY Loop;   Surgeon: Robbi Lovell III, MD;  Location: [facility] OR;  Service: General;  Laterality: N/A;    ILEOSTOMY CLOSURE N/A 5/18/2020    Procedure: CLOSURE, ILEOSTOMY;  Surgeon: Robbi Lovell III, MD;  Location: [facility] OR;  Service: General;  Laterality: N/A;    LASER LITHOTRIPSY Right 3/30/2021    Procedure: LITHOTRIPSY, USING LASER;  Surgeon: Damari Barber Jr., MD;  Location: [facility] OR;  Service: Urology;  Laterality: Right;    LUMBAR LAMINECTOMY      LYSIS OF ADHESIONS N/A 2/13/2020    Procedure: LYSIS, ADHESIONS;  Surgeon: Robbi Lovell III, MD;  Location: [facility] OR;  Service: General;  Laterality: N/A;    NECK SURGERY      PERCUTANEOUS TRANSLUMINAL ANGIOPLASTY OF ARTERIOVENOUS FISTULA N/A 1/19/2022    Procedure: PTA, AV FISTULA;  Surgeon: Raz Maher MD;  Location: [facility] CATH/EP LAB;  Service: Cardiothoracic;  Laterality: N/A;    PHLEBOGRAPHY  2/28/2020    Procedure: VENOGRAM;  Surgeon: Robbi Lovell III, MD;  Location: [facility] OR;  Service: General;;    REMOVAL OF VASCULAR ACCESS PORT Right 2/13/2020    Procedure: REMOVAL, VASCULAR ACCESS PORT;  Surgeon: Robbi Lovell III, MD;  Location: [facility] OR;  Service: General;  Laterality: Right;    RETROGRADE PYELOGRAPHY  3/16/2021    Procedure: PYELOGRAM, RETROGRADE;  Surgeon: Damari aBrber Jr., MD;  Location: [facility] OR;  Service: Urology;;    RETROGRADE PYELOGRAPHY Right 5/18/2021    Procedure: PYELOGRAM, RETROGRADE;  Surgeon: Damari Barber Jr., MD;  Location: [facility] OR;  Service: Urology;  Laterality: Right;    SHOULDER SURGERY      URETEROSCOPIC REMOVAL OF URETERIC CALCULUS Right 3/30/2021    Procedure: REMOVAL, CALCULUS, URETER, URETEROSCOPIC;  Surgeon: Damari Barber Jr., MD;  Location: [facility] OR;  Service: Urology;  Laterality: Right;    URETEROSCOPIC REMOVAL OF URETERIC CALCULUS Right 4/20/2021    Procedure: REMOVAL, CALCULUS, URETER, URETEROSCOPIC;  Surgeon: Damari Barber Jr., MD;  Location: [facility] OR;  Service: Urology;  Laterality: Right;     vulva cancer         Past Social History:  Social History     Socioeconomic History    Marital status:    Tobacco Use    Smoking status: Former Smoker     Packs/day: 1.00     Years: 33.00     Pack years: 33.00     Types: Cigarettes     Quit date: 2000     Years since quittin.3    Smokeless tobacco: Former User     Quit date:    Substance and Sexual Activity    Alcohol use: No    Drug use: No       Medications:  Scheduled Meds:  Continuous Infusions:  PRN Meds:.  Current Facility-Administered Medications on File Prior to Encounter   Medication Dose Route Frequency Provider Last Rate Last Admin    electrolyte-S (ISOLYTE)   Intravenous Continuous Raz Ruff MD        lactated ringers infusion   Intravenous Continuous Ruy Beltre MD        LIDOcaine (PF) 10 mg/ml (1%) injection 5 mg  0.5 mL Intradermal Once Raz Ruff MD         Current Outpatient Medications on File Prior to Encounter   Medication Sig Dispense Refill    acetaminophen (TYLENOL EXTRA STRENGTH) 500 MG tablet Take 1,000 mg by mouth daily as needed for Pain.      cyclobenzaprine (FLEXERIL) 10 MG tablet TAKE ONE TABLET BY MOUTH THREE TIMES DAILY AS NEEDED FOR MUSCLE SPASMS 30 tablet 1    diltiaZEM (CARDIZEM CD) 120 MG Cp24 TAKE 1 CAPSULE DAILY 90 capsule 3    diphenhydrAMINE (BENADRYL) 25 mg capsule Take 25 mg by mouth every 12 (twelve) hours as needed for Itching.      ferric citrate (AURYXIA) 210 mg iron Tab Take 2 tablets by mouth 3 (three) times daily with meals.      gabapentin (NEURONTIN) 100 MG capsule Take 100 mg by mouth 2 (two) times daily. HCS      hydrOXYzine HCL (ATARAX) 10 MG Tab Take 1 tablet (10 mg total) by mouth 3 (three) times daily as needed. 15 tablet 2    levothyroxine (SYNTHROID) 88 MCG tablet TAKE 1 TABLET BEFORE BREAKFAST 90 tablet 3    promethazine (PHENERGAN) 25 MG tablet TAKE 1 TABLET(25 MG) BY MOUTH TWICE DAILY AS NEEDED FOR NAUSEA (Patient taking differently: Take 25  mg by mouth 2 (two) times daily as needed for Nausea.) 60 tablet 0    rOPINIRole (REQUIP) 0.25 MG tablet Take 0.25 mg by mouth 3 (three) times daily. 1 tablet in the morning and 2 tablets at night      sertraline (ZOLOFT) 50 MG tablet Take 1 tablet (50 mg total) by mouth every evening. Take 50 mg by mouth every evening. 90 tablet 1    cefUROXime (CEFTIN) 250 MG tablet Take 250 mg by mouth 2 (two) times daily.      cholestyramine/aspartame (QUESTRAN AND AQUAPHOR TOPICAL COMPOUND) Apply to affected area twice daily 1 Container 1    doxycycline (VIBRA-TABS) 100 MG tablet Take 100 mg by mouth 2 (two) times daily.      HYDROcodone-acetaminophen (NORCO) 5-325 mg per tablet Take 1 tablet by mouth every 8 (eight) hours as needed for Pain. 221 tablet 0    miconazole (MICOTIN) 2 % cream Apply topically 2 (two) times daily. 28 g 0       Allergies:  Ciprofloxacin and Pcn [penicillins]    Past Family History:  Reviewed; refer to Hospitalist Admission Note    Review of Systems:  Review of Systems - All 14 systems reviewed and negative, except as noted in HPI    Physical Exam:  General Appearance:    NAD, AAO x 3, cooperative, appears stated age   Head:    Normocephalic, atraumatic   Eyes:    PER, EOMI, and conjunctiva/sclera clear bilaterally       Mouth:   Moist mucus membranes, no thrush or oral lesions,       normal dentition   Back:     No CVA tenderness   Lungs:     Clear to auscultation bilaterally, no wheezes, crackles,           rales or rhonchi, symmetric air movement, respirations unlabored   Chest wall:    No tenderness or deformity   Heart:    Regular rate and rhythm, S1 and S2 normal, no murmur, rub   or gallop   Abdomen:     Soft, non-tender, non-distended, bowel sounds active all four   quadrants, no RT or guarding, no masses, no organomegaly   Extremities:   Warm and well perfused, distal pulses are intact, no             cyanosis or peripheral edema   MSK:   No joint or muscle swelling, tenderness or  deformity   Skin:   Didn't examine vaginal area   Neurologic/Psychiatric:   CNII-XII intact, normal strength and sensation       throughout, no asterixis; normal affect, memory, judgement     and insight      Results:  Lab Results   Component Value Date     04/23/2022    K 4.7 04/23/2022     04/23/2022    CO2 24 04/23/2022    BUN 15 04/23/2022    CREATININE 4.4 (H) 04/23/2022    CALCIUM 8.0 (L) 04/23/2022    ANIONGAP 9 04/23/2022    ESTGFRAFRICA 10.8 (A) 04/23/2022    EGFRNONAA 9.4 (A) 04/23/2022       Lab Results   Component Value Date    CALCIUM 8.0 (L) 04/23/2022    PHOS 6.6 (H) 01/18/2022       Recent Labs   Lab 04/23/22  1055   WBC 5.55   RBC 2.86*   HGB 10.6*   HCT 33.2*   *   *   MCH 37.1*   MCHC 31.9*       I have personally reviewed pertinent radiological imaging and reports.    I have spent > 70 minutes providing care for this patient for the above diagnoses. These services have included chart/data/imaging review, evaluation, exam, formulation of plan, , note preparation, and discussions with staff involved in this patient's care.    Yasmin Parker MD MPH  [facility] [address]  [place] [address]  [phone] (p)  [phone] (f)

## 2022-04-23 NOTE — H&P
[facility] - Emergency Dept  Hospital Medicine  History & Physical    Patient Name: Shara Hutchins  MRN: [id number]  Patient Class: Emergency  Admission Date: 4/23/2022  Attending Physician: Porfirio Meade MD   Primary Care Provider: April Horton MD         Patient information was obtained from patient and ER records.  And ED MD    Subjective:     Principal Problem:Vulvar cellulitis    Chief Complaint:   Chief Complaint   Patient presents with    Vaginal Pain     X 3 WEEKS, STATES VAGINAL  PAIN AND SWELLING        HPI: Shara Hutchins is a 72 year old female  presents to  ED with vulvar cellulitis that is recurrent. Last episode 1/2022 . Required hospitalization  The patient states that about  2 weeks ago she developed pain and itching. She took keflex but symptoms have worsened   She has  hx vulvar cancer S/P  radiation and has since had recurrent episodes of cellulitis   Patient also w/DM 2 , ESRD MWF , HTN hypothyroid .  Patient states she has had increased pain., chronic diarrhea, no fever no chills no chest pain no shortness of breath.      Past Medical History:   Diagnosis Date    Cellulitis of trunk     Perineum, numerous episodes    Chronic diarrhea 02/10/2020    Short-gut syndrome    Diabetes mellitus, type 2     pt has not had for years after losing weight    End stage renal disease on dialysis 01/07/2018    Fatigue     Hypertension     Hypothyroidism 2/10/2020    Iron deficiency anemia due to chronic blood loss 1/7/2018    Pulmonary hypertension 3/20/2020    Vulvar cancer 1/7/2019       Past Surgical History:   Procedure Laterality Date    ABDOMINAL SURGERY      ANGIOGRAPHY OF ARTERIOVENOUS SHUNT N/A 1/19/2022    Procedure: Fistulogram with Possible Intervention;  Surgeon: Raz Maher MD;  Location: [facility] CATH/EP LAB;  Service: Cardiothoracic;  Laterality: N/A;    APPENDECTOMY      AV FISTULA PLACEMENT Left 2018    BACK SURGERY      BLADDER FULGURATION N/A 6/16/2020     Procedure: FULGURATION, BLADDER;  Surgeon: Damari Barber Jr., MD;  Location: [facility] OR;  Service: Urology;  Laterality: N/A;    carpel tunnel surgery once on each hand      CERVICAL FUSION      x 4    cervix repair      CHOLECYSTECTOMY  2000    COLECTOMY      CYSTOSCOPY W/ RETROGRADES  6/16/2020    Procedure: CYSTOSCOPY, WITH RETROGRADE PYELOGRAM;  Surgeon: Damari Barber Jr., MD;  Location: [facility] OR;  Service: Urology;;    CYSTOSCOPY W/ RETROGRADES Right 4/20/2021    Procedure: CYSTOSCOPY, WITH RETROGRADE PYELOGRAM;  Surgeon: Damari Barber Jr., MD;  Location: [facility] OR;  Service: Urology;  Laterality: Right;    CYSTOSCOPY W/ URETERAL STENT PLACEMENT Right 3/16/2021    Procedure: CYSTOSCOPY, WITH URETERAL STENT INSERTION;  Surgeon: Damari Barber Jr., MD;  Location: [facility] OR;  Service: Urology;  Laterality: Right;    CYSTOSCOPY W/ URETERAL STENT PLACEMENT Right 5/18/2021    Procedure: CYSTOSCOPY, WITH URETERAL STENT INSERTION;  Surgeon: Damari Barber Jr., MD;  Location: [facility] OR;  Service: Urology;  Laterality: Right;    CYSTOSCOPY W/ URETERAL STENT REMOVAL Right 4/20/2021    Procedure: CYSTOSCOPY, WITH URETERAL STENT REMOVAL;  Surgeon: Damari Barber Jr., MD;  Location: [facility] OR;  Service: Urology;  Laterality: Right;    CYSTOSCOPY WITH URETEROSCOPY, RETROGRADE PYELOGRAPHY, AND INSERTION OF STENT Right 3/30/2021    Procedure: CYSTOSCOPY, WITH RETROGRADE PYELOGRAM AND URETERAL STENT INSERTION;  Surgeon: Damari Barber Jr., MD;  Location: [facility] OR;  Service: Urology;  Laterality: Right;    CYSTOURETEROSCOPY WITH RETROGRADE PYELOGRAPHY AND INSERTION OF STENT INTO URETER Right 1/18/2022    Procedure: CYSTOURETEROSCOPY, WITH RETROGRADE PYELOGRAM AND URETERAL STENT INSERTION;  Surgeon: Damari Barber Jr., MD;  Location: [facility] OR;  Service: Urology;  Laterality: Right;    DIAGNOSTIC LAPAROSCOPY N/A 2/13/2020    Procedure: LAPAROSCOPY, DIAGNOSTIC;  Surgeon: Robbi Lovell III, MD;  Location: [facility] OR;  Service:  General;  Laterality: N/A;    HYSTERECTOMY  1980    ILEOSTOMY N/A 2/13/2020    Procedure: CREATION, ILEOSTOMY Loop;  Surgeon: Robbi Lovell III, MD;  Location: [facility] OR;  Service: General;  Laterality: N/A;    ILEOSTOMY CLOSURE N/A 5/18/2020    Procedure: CLOSURE, ILEOSTOMY;  Surgeon: Robbi Lovell III, MD;  Location: [facility] OR;  Service: General;  Laterality: N/A;    LASER LITHOTRIPSY Right 3/30/2021    Procedure: LITHOTRIPSY, USING LASER;  Surgeon: Damari Barber Jr., MD;  Location: [facility] OR;  Service: Urology;  Laterality: Right;    LUMBAR LAMINECTOMY      LYSIS OF ADHESIONS N/A 2/13/2020    Procedure: LYSIS, ADHESIONS;  Surgeon: Robbi Lovell III, MD;  Location: [facility] OR;  Service: General;  Laterality: N/A;    NECK SURGERY      PERCUTANEOUS TRANSLUMINAL ANGIOPLASTY OF ARTERIOVENOUS FISTULA N/A 1/19/2022    Procedure: PTA, AV FISTULA;  Surgeon: Raz Maher MD;  Location: [facility] CATH/EP LAB;  Service: Cardiothoracic;  Laterality: N/A;    PHLEBOGRAPHY  2/28/2020    Procedure: VENOGRAM;  Surgeon: Robbi Lovell III, MD;  Location: [facility] OR;  Service: General;;    REMOVAL OF VASCULAR ACCESS PORT Right 2/13/2020    Procedure: REMOVAL, VASCULAR ACCESS PORT;  Surgeon: Robbi Lovell III, MD;  Location: [facility] OR;  Service: General;  Laterality: Right;    RETROGRADE PYELOGRAPHY  3/16/2021    Procedure: PYELOGRAM, RETROGRADE;  Surgeon: Damari Barber Jr., MD;  Location: [facility] OR;  Service: Urology;;    RETROGRADE PYELOGRAPHY Right 5/18/2021    Procedure: PYELOGRAM, RETROGRADE;  Surgeon: Damari Barber Jr., MD;  Location: [facility] OR;  Service: Urology;  Laterality: Right;    SHOULDER SURGERY      URETEROSCOPIC REMOVAL OF URETERIC CALCULUS Right 3/30/2021    Procedure: REMOVAL, CALCULUS, URETER, URETEROSCOPIC;  Surgeon: Damari Barber Jr., MD;  Location: [facility] OR;  Service: Urology;  Laterality: Right;    URETEROSCOPIC REMOVAL OF URETERIC CALCULUS Right 4/20/2021    Procedure: REMOVAL, CALCULUS,  URETER, URETEROSCOPIC;  Surgeon: Damari Barber Jr., MD;  Location: [facility];  Service: Urology;  Laterality: Right;    vulva cancer         Review of patient's allergies indicates:   Allergen Reactions    Ciprofloxacin Other (See Comments)     Elevated liver enzymes      Pcn [penicillins] Anaphylaxis     TOLERATES ROCEPHIN WITHOUT DIFFICULTY       Current Facility-Administered Medications on File Prior to Encounter   Medication    electrolyte-S (ISOLYTE)    lactated ringers infusion    LIDOcaine (PF) 10 mg/ml (1%) injection 5 mg     Current Outpatient Medications on File Prior to Encounter   Medication Sig    acetaminophen (TYLENOL EXTRA STRENGTH) 500 MG tablet Take 1,000 mg by mouth daily as needed for Pain.    cyclobenzaprine (FLEXERIL) 10 MG tablet TAKE ONE TABLET BY MOUTH THREE TIMES DAILY AS NEEDED FOR MUSCLE SPASMS    diltiaZEM (CARDIZEM CD) 120 MG Cp24 TAKE 1 CAPSULE DAILY    diphenhydrAMINE (BENADRYL) 25 mg capsule Take 25 mg by mouth every 12 (twelve) hours as needed for Itching.    ferric citrate (AURYXIA) 210 mg iron Tab Take 2 tablets by mouth 3 (three) times daily with meals.    gabapentin (NEURONTIN) 100 MG capsule Take 100 mg by mouth 2 (two) times daily. HCS    hydrOXYzine HCL (ATARAX) 10 MG Tab Take 1 tablet (10 mg total) by mouth 3 (three) times daily as needed.    levothyroxine (SYNTHROID) 88 MCG tablet TAKE 1 TABLET BEFORE BREAKFAST    promethazine (PHENERGAN) 25 MG tablet TAKE 1 TABLET(25 MG) BY MOUTH TWICE DAILY AS NEEDED FOR NAUSEA (Patient taking differently: Take 25 mg by mouth 2 (two) times daily as needed for Nausea.)    rOPINIRole (REQUIP) 0.25 MG tablet Take 0.25 mg by mouth 3 (three) times daily. 1 tablet in the morning and 2 tablets at night    sertraline (ZOLOFT) 50 MG tablet Take 1 tablet (50 mg total) by mouth every evening. Take 50 mg by mouth every evening.    cefUROXime (CEFTIN) 250 MG tablet Take 250 mg by mouth 2 (two) times daily.    cholestyramine/aspartame  (QUESTRAN AND AQUAPHOR TOPICAL COMPOUND) Apply to affected area twice daily    doxycycline (VIBRA-TABS) 100 MG tablet Take 100 mg by mouth 2 (two) times daily.    HYDROcodone-acetaminophen (NORCO) 5-325 mg per tablet Take 1 tablet by mouth every 8 (eight) hours as needed for Pain.    miconazole (MICOTIN) 2 % cream Apply topically 2 (two) times daily.     Family History       Problem Relation (Age of Onset)    Cancer Mother    Heart disease Mother, Father, Sister    Hypertension Mother    No Known Problems Daughter          Tobacco Use    Smoking status: Former Smoker     Packs/day: 1.00     Years: 33.00     Pack years: 33.00     Types: Cigarettes     Quit date: 2000     Years since quittin.3    Smokeless tobacco: Former User     Quit date:    Substance and Sexual Activity    Alcohol use: No    Drug use: No    Sexual activity: Not on file   Family history noncontributory to this admission.  Reviewed  Review of Systems comprehensive review of systems are as per HPI otherwise noncontributory  Objective:     Vital Signs (Most Recent):  Temp: 98.2 °F (36.8 °C) (22 0925)  Pulse: 75 (22 1125)  Resp: 17 (22 1057)  BP: (!) 141/64 (22 1125)  SpO2: 96 % (22 1125)   Vital Signs (24h Range):  Temp:  [98.2 °F (36.8 °C)] 98.2 °F (36.8 °C)  Pulse:  [71-86] 75  Resp:  [17-18] 17  SpO2:  [96 %-100 %] 96 %  BP: (141-156)/(64-82) 141/64     Weight: 64.4 kg (142 lb)  Body mass index is 21.91 kg/m².    Physical Exam      Patient appears in no acute distress.  Lying in a gurney in the emergency department  HEENT sclerae nonicteric  Neck is supple nontender  Lungs clear to auscultation  Heart regular rate and rhythm  Abdomen is soft nontender NT  Extremities no edema no deformities   exam see photos.  No Godwin  Skin Cellulitis at superior aspect of ulna extending to perineum and gluteal folds.  Marked erythema  Neuro patient is alert oriented x3 motor exam is nonfocal    Significant  Labs: All pertinent labs within the past 24 hours have been reviewed.  BMP:   Recent Labs   Lab 04/23/22  1055   GLU 70      K 4.7      CO2 24   BUN 15   CREATININE 4.4*   CALCIUM 8.0*     CBC:   Recent Labs   Lab 04/23/22  1055   WBC 5.55   HGB 10.6*   HCT 33.2*   *     CMP:   Recent Labs   Lab 04/23/22  1055      K 4.7      CO2 24   GLU 70   BUN 15   CREATININE 4.4*   CALCIUM 8.0*   PROT 6.0   ALBUMIN 3.2*   BILITOT 0.7   ALKPHOS 85   AST 19   ALT 10   ANIONGAP 9   EGFRNONAA 9.4*         Significant Imaging:   CT Abdomen Pelvis With Contrast    Status: Final result         [employer] Results Release    [employer] Status: Active  Results Release           PACS Images for [employer] Viewer     Show images for CT Abdomen Pelvis With Contrast                  CT Abdomen Pelvis With Contrast  Order: [id number]   Status: Final result     Visible to patient: Yes (not seen)     Next appt: 05/10/2022 at 01:20 PM in Family Medicine (April Horton MD)     0 Result Notes    Details    Reading Physician Reading Date Result Priority   Rashi Wilder MD  [phone] 4/23/2022      Narrative & Impression  CMS MANDATED QUALITY DATA - CT RADIATION  436     All CT scans at this facility utilize dose modulation, iterative reconstruction, and/or weight based dosing when appropriate to reduce radiation dose to as low as reasonably achievable.     CLINICAL HISTORY:  72 years (1950) Female Soft tissue infection suspected, pelvis, xray done; h/o recurrent perineal cellulitis, worsening despite abx Original order was a Pelvis with, changed to abd/pelvis due to abnormal gas pattern     TECHNIQUE:  CT ABDOMEN PELVIS WITH IV CONTRAST. 416 images obtained. Axial CT images of the abdomen and pelvis were obtained from the dome of the diaphragm to the proximal thigh.     CONTRAST:  100 mL of IV Omni 350 was administered uneventfully by IV     COMPARISON:  Most recent CT from January 16, 2022.      FINDINGS:.  Lower Thorax:  The lungs are essentially clear noting mild bilateral dependent atelectasis versus scarring. There is no pleural or pericardial effusion. There are scattered coronary arterial calcifications.     CT Abdomen:  Liver: The liver is normal in size and imaging appearance.  Gallbladder: Cholecystectomy.  Biliary Tree: Mild central intrahepatic and extrahepatic biliary ductal dilation with a transition point in the periampullary CBD, consider correlation with liver function tests and for history of Ellis mean. This is unchanged when compared to the previous exam.  Spleen: Within normal limits.  Pancreas: The pancreas is normal.  Adrenal Glands: The adrenal glands appear within normal limits.  Kidneys: Right-sided double-J ureteral stent in expected configuration with proximal coil projecting of the renal pelvis and distal coil projecting of the bladder. There is no hydronephrosis or hydroureter. There is a 6 x 5 mm triangle or stone projecting along the mid ureter, and additional 5 x 4 mm stone projecting along the distal right ureter. There are scattered bilateral additional nonobstructing renal stones with calcification along the medullary. Mid compatible with medullary nephrocalcinosis.  Vasculature: There are scattered atheromatous mural plaques in the aorta and its major branch vessels with no aneurysm seen.  Lymph nodes: No abdominal lymphadenopathy is seen.  Intraperitoneal structures: There is no ascites.  Bowel: Large volume of stool and gas seen in the colon with a nonobstructive bowel gas pattern, consider correlation for constipation.  Abdominal wall: The abdominal wall and musculature are normal.  Musculoskeletal: There is degenerative disc disease and facet arthropathy in the lumbar spine with no aggressive appearing lytic or blastic lesions .     CT Pelvis:  Bladder: The bladder is partially decompressed. That being said, there is mild circumferential bladder wall thickening.  In the appropriate clinical setting this may relate to a mild degree of infection or inflammation; consider correlation with urinalysis.  Reproductive Organs: The uterus is not visualized/surgically absent. There is nonspecific thickening of the vaginal wall.  Pelvic Lymph nodes: No pelvic lymphadenopathy or pelvic mass is identified.     IMPRESSION:  1. Large volume of stool and gas in the colon with a nonobstructive bowel gas pattern, consider correlation for constipation.  2. Nonspecific thickening of the vulvovaginal skin suggestive of cellulitis, consider correlation with physical exam. No finding of abscess or gas forming infection (necrotizing fasciitis/Iman's gangrene).  3. Circumferential bladder wall thickening, consider correlation with urinalysis for cystitis.  4. Right-sided double-J ureteral stent in expected configuration without hydronephrosis.  5. Stones in the right mid and distal ureter.  6. Numerous additional bilateral nonobstructing renal stones, and findings compatible with medullary nephrocalcinosis.  7. Unchanged mild central intrahepatic and extrahepatic biliary ductal dilation.  8. Additional and incidental findings as above, similar to the previous exam.        Assessment/Plan:     * Vulvar cellulitis  Recurrent cellulitis history of radiation  Start IV antibiotics.  Consult ID.      ESRD on dialysis MWF  Patient with end-stage renal disease on dialysis Monday Wednesday Friday last had dialysis yesterday  Monitoring      Chronic diarrhea  Monitoring      Anemia of chronic disease  Monitoring      Type 2 diabetes mellitus  Resume patient's home meds  Follow sliding scale.  Check hemoglobin A1c      IV antibiotics  Wound care ID eval is pending  Will consult Nephrology on Monday if patient is still hospitalized    VTE Risk Mitigation (From admission, onward)    None             Param Ruiz DO  Department of Hospital Medicine   [facility] - Emergency Dept

## 2022-04-24 NOTE — PROGRESS NOTES
[facility] Medicine  Progress Note    Patient Name: Shara Hutchins  MRN: [id number]  Patient Class: OP- Observation   Admission Date: 4/23/2022  Length of Stay: 0 days  Attending Physician: Johnathan Carbajal MD  Primary Care Provider: April Horton MD        Subjective:     Principal Problem:Vulvar cellulitis      Interval History: Admitted for vulvar cellulitis. Reports no improvement in symptoms since admission. Pain is moderate to severe. Reports chronic diarrhea which is slightly improved now. Admits to nausea but no vomiting.     Review of Systems: As per interval history  Objective:     Vital Signs (Most Recent):  Temp: 98.4 °F (36.9 °C) (04/24/22 1623)  Pulse: 64 (04/24/22 1623)  Resp: 16 (04/24/22 1701)  BP: (!) 117/59 (04/24/22 1623)  SpO2: (!) 93 % (04/24/22 1623)   Vital Signs (24h Range):  Temp:  [97.6 °F (36.4 °C)-99.1 °F (37.3 °C)] 98.4 °F (36.9 °C)  Pulse:  [64-73] 64  Resp:  [16-18] 16  SpO2:  [93 %-99 %] 93 %  BP: (117-157)/(59-83) 117/59     Weight: 65.1 kg (143 lb 8.3 oz)  Body mass index is 22.15 kg/m².    Intake/Output Summary (Last 24 hours) at 4/24/2022 1808  Last data filed at 4/24/2022 1745  Gross per 24 hour   Intake 1270 ml   Output 1175 ml   Net 95 ml      Physical Exam  General: Patient resting comfortably in no acute distress.  Eyes: No conjunctival pallor. No scleral icterus.  Lungs: CTA. Good air entry.  Cor: Regular rate and rhythm. No murmurs. No pedal edema.  Abd: Soft. Nontender.   Musculoskeletal: No arthropathy, deformity.  Skin: No rashes, swelling, or erythema.  Neuro: A&O x3. Moving all extremities equally  Ext: LUE AVF noted. Peripheral pulses +    Significant Labs: All pertinent labs within the past 24 hours have been reviewed.  CBC:   Recent Labs   Lab 04/23/22  1055 04/24/22  0536   WBC 5.55 4.56   HGB 10.6* 11.0*   HCT 33.2* 33.9*   * 113*     CMP:   Recent Labs   Lab 04/23/22  1055 04/24/22  0536    141   K 4.7 4.2    105   CO2 24  26   GLU 70 76   BUN 15 23   CREATININE 4.4* 4.8*   CALCIUM 8.0* 8.4*   PROT 6.0  --    ALBUMIN 3.2*  --    BILITOT 0.7  --    ALKPHOS 85  --    AST 19  --    ALT 10  --    ANIONGAP 9 10   EGFRNONAA 9.4* 8.5*       Significant Imaging: I have reviewed all pertinent imaging results/findings within the past 24 hours.      Assessment/Plan:      * Vulvar cellulitis  Recurrent cellulitis - has history of radiation  Start IV antibiotics including vancomycin and ceftriaxone   Oral fluconazole for candida prophylaxis   Consult Dr. Neal from ID       ESRD on dialysis MWF  Patient with end-stage renal disease on dialysis Monday Wednesday Friday   Nephrology following     Chronic diarrhea  Aware  Monitor   Use prn loperamide       Anemia of chronic disease  Likely from ESRD  Daily CBC         VTE Risk Mitigation (From admission, onward)         Ordered     enoxaparin injection 30 mg  Every 24 hours (non-standard times)         04/23/22 1412     IP VTE HIGH RISK PATIENT  Once         04/23/22 1407     Place sequential compression device  Until discontinued         04/23/22 1407                Discharge Planning   RODRIGO:      Code Status: Full Code   Is the patient medically ready for discharge?:     Reason for patient still in hospital (select all that apply): Patient trending condition and Treatment  Discharge Plan A: Assisted Living                  Johnathan Carbajal MD  Department of Hospital Medicine   [facility]

## 2022-04-24 NOTE — PLAN OF CARE
04/24/22 0901   Discharge Assessment   Assessment Type Discharge Planning Assessment   Confirmed/corrected address, phone number and insurance Yes   Confirmed Demographics Correct on Facesheet   Source of Information patient   When was your last doctors appointment?   (02/2022)   Communicated RORDIGO with patient/caregiver Date not available/Unable to determine   Reason For Admission Vulvar Cellulitis   Lives With facility resident   Facility Arrived From: [facility]   Do you expect to return to your current living situation? Yes   Do you have help at home or someone to help you manage your care at home? Yes   Who are your caregiver(s[facility] and [facility]   Prior to hospitilization cognitive status: Alert/Oriented   Current cognitive status: Alert/Oriented   Walking or Climbing Stairs Difficulty none   Dressing/Bathing Difficulty none   Equipment Currently Used at Home none   Readmission within 30 days? No   Patient currently being followed by outpatient case management? No   Do you currently have service(s) that help you manage your care at home? Yes  (Patient is a facility resident and reports having services daily.)   Is the pt/caregiver preference to resume services with current agency Yes   Do you take prescription medications? Yes   Do you have prescription coverage? Yes   Coverage [employer]   Do you have any problems affording any of your prescribed medications? No   Is the patient taking medications as prescribed? yes   Who is going to help you get home at discharge? [facility] Staff   How do you get to doctors appointments? agency   Are you on dialysis? No   Do you take coumadin? No   Discharge Plan A Assisted Living   Discharge Plan B Assisted Living   DME Needed Upon Discharge  none   Discharge Plan discussed with: Patient   Discharge Barriers Identified None   SDOH   (None reported)   Relationship/Environment   Name(s) of Who Lives With Patient [facility] Staff     SARAH and  patient completed initial discharge process at bedside. Patient is a resident at [facility] and receives all of her care daily and onsite. Patient reports that the facility staff will transport her home upon discharge. Patient reports no needs following discharge and request to resume her care at the assisted living facility.

## 2022-04-24 NOTE — PROGRESS NOTES
INPATIENT NEPHROLOGY Progress Note  [facility]    Patient Name: Shara Hutchins  Date: 04/24/2022    Reason for consultation: ESRD    Chief Complaint:   Chief Complaint   Patient presents with    Vaginal Pain     X 3 WEEKS, STATES VAGINAL  PAIN AND SWELLING       History of Present Illness:  73 y/o F with ESRD on HD MWF, short-gut syndrome with chronic diarrhea, h/o vulvar cancer s/p radiation with multiple episodes cellulitis of perineum, and pHTN. Patient experiences recurrences of vulvar cellulitis several times per year. She felt another episode coming on several weeks ago, and filled her PRN Rx Keflex by her ID provider. She has taken keflex for 3 weeks with worsening pain and infection of perineum. This region is so swollen, it causes urine and feces to go up her back when she eliminates.  Associated with nausea without vomiting.  Denies fever, chills, abdominal pain, dysuria, confusion.  She makes urine. Denies exac/reliev factors. Consulted for dialysis.    Interval History:  4/23- no issues with HD yest- comfortable on exam, vitals stable, reports taking medication to prevent diarrhea, does endorse some urinary discomfort    CT AP  1. Large volume of stool and gas in the colon with a nonobstructive bowel gas pattern, consider correlation for constipation.  2. Nonspecific thickening of the vulvovaginal skin suggestive of cellulitis, consider correlation with physical exam. No finding of abscess or gas forming infection (necrotizing fasciitis/Iman's gangrene).  3. Circumferential bladder wall thickening, consider correlation with urinalysis for cystitis.  4. Right-sided double-J ureteral stent in expected configuration without hydronephrosis.  5. Stones in the right mid and distal ureter.  6. Numerous additional bilateral nonobstructing renal stones, and findings compatible with medullary nephrocalcinosis.  7. Unchanged mild central intrahepatic and extrahepatic biliary ductal  dilation.    4/24- getting IV antbx, still with some pain    Plan of Care:    Assessment:  Vulvar cellulitis, GNR UTI  ESRD on HD MWF  HTN  SHPT  Anemia of CKD    Plan:    - Reviewed UA and UCx. Dose antbx for dialysis/CrCl < 10.  - Ordered HD MWF.  - BP/VS are at goal- will UF to dry weight.  - Continue binders with meals.  - Ordered give ADAM with HD.    Thank you for allowing us to participate in this patient's care. We will continue to follow.    Vital Signs:  Temp Readings from Last 3 Encounters:   04/24/22 98.1 °F (36.7 °C) (Oral)   02/08/22 98.8 °F (37.1 °C)   01/26/22 98 °F (36.7 °C)       Pulse Readings from Last 3 Encounters:   04/24/22 68   02/08/22 74   01/26/22 77       BP Readings from Last 3 Encounters:   04/24/22 125/67   02/08/22 120/68   01/26/22 136/73       Weight:  Wt Readings from Last 3 Encounters:   04/23/22 65.1 kg (143 lb 8.3 oz)   02/08/22 64.4 kg (142 lb)   01/18/22 68.3 kg (150 lb 9.2 oz)     Medications:  Scheduled Meds:  Continuous Infusions:  PRN Meds:.  Current Facility-Administered Medications on File Prior to Encounter   Medication Dose Route Frequency Provider Last Rate Last Admin    electrolyte-S (ISOLYTE)   Intravenous Continuous Raz Ruff MD        lactated ringers infusion   Intravenous Continuous Ruy Beltre MD        LIDOcaine (PF) 10 mg/ml (1%) injection 5 mg  0.5 mL Intradermal Once Raz Ruff MD         Current Outpatient Medications on File Prior to Encounter   Medication Sig Dispense Refill    acetaminophen (TYLENOL EXTRA STRENGTH) 500 MG tablet Take 1,000 mg by mouth daily as needed for Pain.      cyclobenzaprine (FLEXERIL) 10 MG tablet TAKE ONE TABLET BY MOUTH THREE TIMES DAILY AS NEEDED FOR MUSCLE SPASMS 30 tablet 1    diltiaZEM (CARDIZEM CD) 120 MG Cp24 TAKE 1 CAPSULE DAILY 90 capsule 3    diphenhydrAMINE (BENADRYL) 25 mg capsule Take 25 mg by mouth every 12 (twelve) hours as needed for Itching.      ferric citrate (AURYXIA) 210 mg iron  Tab Take 2 tablets by mouth 3 (three) times daily with meals.      gabapentin (NEURONTIN) 100 MG capsule Take 100 mg by mouth 2 (two) times daily. HCS      hydrOXYzine HCL (ATARAX) 10 MG Tab Take 1 tablet (10 mg total) by mouth 3 (three) times daily as needed. 15 tablet 2    levothyroxine (SYNTHROID) 88 MCG tablet TAKE 1 TABLET BEFORE BREAKFAST 90 tablet 3    promethazine (PHENERGAN) 25 MG tablet TAKE 1 TABLET(25 MG) BY MOUTH TWICE DAILY AS NEEDED FOR NAUSEA (Patient taking differently: Take 25 mg by mouth 2 (two) times daily as needed for Nausea.) 60 tablet 0    rOPINIRole (REQUIP) 0.25 MG tablet Take 0.25 mg by mouth 3 (three) times daily. 1 tablet in the morning and 2 tablets at night      sertraline (ZOLOFT) 50 MG tablet Take 1 tablet (50 mg total) by mouth every evening. Take 50 mg by mouth every evening. 90 tablet 1    cefUROXime (CEFTIN) 250 MG tablet Take 250 mg by mouth 2 (two) times daily.      cholestyramine/aspartame (QUESTRAN AND AQUAPHOR TOPICAL COMPOUND) Apply to affected area twice daily 1 Container 1    doxycycline (VIBRA-TABS) 100 MG tablet Take 100 mg by mouth 2 (two) times daily.      HYDROcodone-acetaminophen (NORCO) 5-325 mg per tablet Take 1 tablet by mouth every 8 (eight) hours as needed for Pain. 221 tablet 0    miconazole (MICOTIN) 2 % cream Apply topically 2 (two) times daily. 28 g 0       Review of Systems:  Neg    Physical Exam:  Constitutional: nad, aao x 3  Heart: rrr, no m/r/g, wwp, no edema  Lungs: ctab, no w/r/r/c, no lb  Abdomen: s/nt/nd, +BS    Results:  Lab Results   Component Value Date     04/24/2022    K 4.2 04/24/2022     04/24/2022    CO2 26 04/24/2022    BUN 23 04/24/2022    CREATININE 4.8 (H) 04/24/2022    CALCIUM 8.4 (L) 04/24/2022    ANIONGAP 10 04/24/2022    ESTGFRAFRICA 9.8 (A) 04/24/2022    EGFRNONAA 8.5 (A) 04/24/2022       Lab Results   Component Value Date    CALCIUM 8.4 (L) 04/24/2022    PHOS 6.6 (H) 01/18/2022       Recent Labs   Lab  04/24/22  0536   WBC 4.56   RBC 2.98*   HGB 11.0*   HCT 33.9*   *   *   MCH 36.9*   MCHC 32.4       I have personally reviewed pertinent radiological imaging and reports.    I have spent > 35 minutes providing care for this patient for the above diagnoses. These services have included chart/data/imaging review, evaluation, exam, formulation of plan, , note preparation, and discussions with staff involved in this patient's care.    Yasmin Parker MD MPH  [facility]  [address] [place] [id number]  [phone] (p)  [phone] (f)

## 2022-04-24 NOTE — ASSESSMENT & PLAN NOTE
Recurrent cellulitis - has history of radiation  Start IV antibiotics including vancomycin and ceftriaxone   Oral fluconazole for candida prophylaxis   Consult Dr. Neal from ID

## 2022-04-24 NOTE — SUBJECTIVE & OBJECTIVE
Interval History: Admitted for vulvar cellulitis. Reports no improvement in symptoms since admission. Pain is moderate to severe. Reports chronic diarrhea which is slightly improved now. Admits to nausea but no vomiting.     Review of Systems: As per interval history  Objective:     Vital Signs (Most Recent):  Temp: 98.4 °F (36.9 °C) (04/24/22 1623)  Pulse: 64 (04/24/22 1623)  Resp: 16 (04/24/22 1701)  BP: (!) 117/59 (04/24/22 1623)  SpO2: (!) 93 % (04/24/22 1623)   Vital Signs (24h Range):  Temp:  [97.6 °F (36.4 °C)-99.1 °F (37.3 °C)] 98.4 °F (36.9 °C)  Pulse:  [64-73] 64  Resp:  [16-18] 16  SpO2:  [93 %-99 %] 93 %  BP: (117-157)/(59-83) 117/59     Weight: 65.1 kg (143 lb 8.3 oz)  Body mass index is 22.15 kg/m².    Intake/Output Summary (Last 24 hours) at 4/24/2022 1808  Last data filed at 4/24/2022 1745  Gross per 24 hour   Intake 1270 ml   Output 1175 ml   Net 95 ml      Physical Exam  General: Patient resting comfortably in no acute distress.  Eyes: No conjunctival pallor. No scleral icterus.  Lungs: CTA. Good air entry.  Cor: Regular rate and rhythm. No murmurs. No pedal edema.  Abd: Soft. Nontender.   Musculoskeletal: No arthropathy, deformity.  Skin: No rashes, swelling, or erythema.  Neuro: A&O x3. Moving all extremities equally  Ext: LUE AVF noted. Peripheral pulses +    Significant Labs: All pertinent labs within the past 24 hours have been reviewed.  CBC:   Recent Labs   Lab 04/23/22  1055 04/24/22  0536   WBC 5.55 4.56   HGB 10.6* 11.0*   HCT 33.2* 33.9*   * 113*     CMP:   Recent Labs   Lab 04/23/22  1055 04/24/22  0536    141   K 4.7 4.2    105   CO2 24 26   GLU 70 76   BUN 15 23   CREATININE 4.4* 4.8*   CALCIUM 8.0* 8.4*   PROT 6.0  --    ALBUMIN 3.2*  --    BILITOT 0.7  --    ALKPHOS 85  --    AST 19  --    ALT 10  --    ANIONGAP 9 10   EGFRNONAA 9.4* 8.5*       Significant Imaging: I have reviewed all pertinent imaging results/findings within the past 24 hours.

## 2022-04-24 NOTE — PROGRESS NOTES
Pharmacokinetic Assessment Follow Up: IV Vancomycin    Vancomycin serum concentration assessment(s):    The random level was drawn correctly and can be used to guide therapy at this time. The measurement is within the desired definitive target range of 10 to 15 mcg/mL.    Vancomycin Regimen Plan:    Re-dose when the random level is less than 15 mcg/mL, next level to be drawn at morning labs on 4/26/22 .    Drug levels (last 3 results):  Recent Labs   Lab Result Units 04/24/22  1546   Vancomycin, Random ug/mL 13.2       Pharmacy will continue to follow and monitor vancomycin.    Please contact pharmacy at extension [phone] for questions regarding this assessment.    Thank you for the consult,   Natasha Zuluaga       Patient brief summary:  Shara Hutchins is a 72 y.o. female initiated on antimicrobial therapy with IV Vancomycin for treatment of skin & soft tissue infection    The patient's current regimen is Vancomycin Intermittent dosing.    Drug Allergies:   Review of patient's allergies indicates:   Allergen Reactions    Ciprofloxacin Other (See Comments)     Elevated liver enzymes      Pcn [penicillins] Anaphylaxis     TOLERATES ROCEPHIN WITHOUT DIFFICULTY       Actual Body Weight:   65.1 kg    Renal Function:   Estimated Creatinine Clearance: 10.5 mL/min (A) (based on SCr of 4.8 mg/dL (H)).,     Dialysis Method (if applicable):  intermittent HD    CBC (last 72 hours):  Recent Labs   Lab Result Units 04/23/22  1055 04/24/22  0536   WBC K/uL 5.55 4.56   Hemoglobin g/dL 10.6* 11.0*   Hematocrit % 33.2* 33.9*   Platelets K/uL 121* 113*   Gran % % 62.0 59.0   Lymph % % 27.2 28.1   Mono % % 8.1 9.0   Eosinophil % % 2.3 3.3   Basophil % % 0.2 0.2   Differential Method  Automated Automated       Metabolic Panel (last 72 hours):  Recent Labs   Lab Result Units 04/23/22  1055 04/23/22  1727 04/24/22  0536   Sodium mmol/L 143  --  141   Potassium mmol/L 4.7  --  4.2   Chloride mmol/L 110  --  105   CO2 mmol/L 24  --  26   Glucose  mg/dL 70  --  76   Glucose, UA   --  Negative  Negative  --    BUN mg/dL 15  --  23   Creatinine mg/dL 4.4*  --  4.8*   Albumin g/dL 3.2*  --   --    Total Bilirubin mg/dL 0.7  --   --    Alkaline Phosphatase U/L 85  --   --    AST U/L 19  --   --    ALT U/L 10  --   --        Vancomycin Administrations:  vancomycin given in the last 96 hours                     vancomycin in dextrose 5 % 1 gram/250 mL IVPB 1,000 mg (mg) 1,000 mg New Bag 04/23/22 1721                    Microbiologic Results:  Microbiology Results (last 7 days)       Procedure Component Value Units Date/Time    Urine culture [[id number]]  (Abnormal) Collected: 04/23/22 1727    Order Status: Completed Specimen: Urine Updated: 04/24/22 0723     Urine Culture, Routine GRAM NEGATIVE CRISTHIAN, NON-LACTOSE   >100,000 cfu/ml  Identification and susceptibility pending      Narrative:      Specimen Source->Urine

## 2022-04-25 NOTE — HOSPITAL COURSE
72-year-old female with history of vulvar cellulitis in the setting of prior irradiation for vulvar cancer in 2019 and prior admissions for similar symptoms admitted for recurrent vulvar cellulitis.  Initiated on vancomycin and ceftriaxone.  Being followed by infectious disease.  Nephrology following for ESRD on HD.    4/25:Admitted for vulvar cellulitis. Reports no improvement in symptoms since admission. Pain is moderate to severe. Reports chronic diarrhea which is slightly improved now. Admits to nausea but no vomiting.      4/26:  Patient states that she is doing well however still sees blood in urine due to vulva excoriation.  Urine culture grew Klebsiella ESBL which is resistant to multiple medications but sensitive to levofloxacin, Bactrim and meropenem.  Patient is allergic to penicillins, and due to kidney function, status patient on Macrobid impaction.  Will renally dose IV levofloxacin and touch bases with ID concerning appropriate antibiotics to place patient on.  No concerns/issues overnight reported by the patient or the nursing staff.     4/27:  Patient currently on IV Levaquin and vancomycin, urine culture currently recheck.  For dialysis today.  Patient states that she has had chronic diarrhea since 2013 and there has been no change in frequency and is still the same.  C diff negative, vitamin B12 low and would start the patient is on vitamin B12 injections.     4/28:  Patient states that she thinks she has a kidney stone and she is having flank pain, would order renal CT.  No acute overnight changes     4/29:  Patient doing well and currently receiving dialysis.  CT shows stable right ureteral stent however moderate volume of stool and gas in colon.  Will give patient 1 dose lactulose and continue to monitor.

## 2022-04-25 NOTE — PROGRESS NOTES
INPATIENT NEPHROLOGY Progress Note  [facility]    Patient Name: Shara Hutchins  Date: 04/25/2022    Reason for consultation: ESRD    Chief Complaint:   Chief Complaint   Patient presents with    Vaginal Pain     X 3 WEEKS, STATES VAGINAL  PAIN AND SWELLING       History of Present Illness:  71 y/o F with ESRD on HD MWF, short-gut syndrome with chronic diarrhea, h/o vulvar cancer s/p radiation with multiple episodes cellulitis of perineum, and pHTN. Patient experiences recurrences of vulvar cellulitis several times per year. She felt another episode coming on several weeks ago, and filled her PRN Rx Keflex by her ID provider. She has taken keflex for 3 weeks with worsening pain and infection of perineum. This region is so swollen, it causes urine and feces to go up her back when she eliminates.  Associated with nausea without vomiting.  Denies fever, chills, abdominal pain, dysuria, confusion.  She makes urine. Denies exac/reliev factors. Consulted for dialysis.    Interval History:  4/23- no issues with HD yest- comfortable on exam, vitals stable, reports taking medication to prevent diarrhea, does endorse some urinary discomfort    CT AP  1. Large volume of stool and gas in the colon with a nonobstructive bowel gas pattern, consider correlation for constipation.  2. Nonspecific thickening of the vulvovaginal skin suggestive of cellulitis, consider correlation with physical exam. No finding of abscess or gas forming infection (necrotizing fasciitis/Iman's gangrene).  3. Circumferential bladder wall thickening, consider correlation with urinalysis for cystitis.  4. Right-sided double-J ureteral stent in expected configuration without hydronephrosis.  5. Stones in the right mid and distal ureter.  6. Numerous additional bilateral nonobstructing renal stones, and findings compatible with medullary nephrocalcinosis.  7. Unchanged mild central intrahepatic and extrahepatic biliary ductal  dilation.    4/24- getting IV antbx, still with some pain  4/25 VSS, Seen on HD today. Appreciate ID input.    Plan of Care:    Vulvar cellulitis vs edema, GNR UTI  ESRD on HD MWF  HTN  Secondary HPT  Anemia of CKD    Plan:    - Dose antbx for dialysis/CrCl < 10. Appreciate ID input.  - Continue HD MWF.  - BP/VS are at goal- will UF as tolerated.  - Continue binders with meals.  - Ordered give ADAM with HD.    Thank you for allowing us to participate in this patient's care. We will continue to follow.    Vital Signs:  Temp Readings from Last 3 Encounters:   04/25/22 98.1 °F (36.7 °C) (Oral)   02/08/22 98.8 °F (37.1 °C)   01/26/22 98 °F (36.7 °C)       Pulse Readings from Last 3 Encounters:   04/25/22 68   02/08/22 74   01/26/22 77       BP Readings from Last 3 Encounters:   04/25/22 128/63   02/08/22 120/68   01/26/22 136/73       Weight:  Wt Readings from Last 3 Encounters:   04/25/22 68.3 kg (150 lb 9.2 oz)   02/08/22 64.4 kg (142 lb)   01/18/22 68.3 kg (150 lb 9.2 oz)     Medications:  Scheduled Meds:  Continuous Infusions:  PRN Meds:.  Current Facility-Administered Medications on File Prior to Encounter   Medication Dose Route Frequency Provider Last Rate Last Admin    electrolyte-S (ISOLYTE)   Intravenous Continuous Raz Ruff MD        lactated ringers infusion   Intravenous Continuous Ruy Beltre MD        LIDOcaine (PF) 10 mg/ml (1%) injection 5 mg  0.5 mL Intradermal Once Raz Ruff MD         Current Outpatient Medications on File Prior to Encounter   Medication Sig Dispense Refill    acetaminophen (TYLENOL EXTRA STRENGTH) 500 MG tablet Take 1,000 mg by mouth daily as needed for Pain.      cyclobenzaprine (FLEXERIL) 10 MG tablet TAKE ONE TABLET BY MOUTH THREE TIMES DAILY AS NEEDED FOR MUSCLE SPASMS 30 tablet 1    diltiaZEM (CARDIZEM CD) 120 MG Cp24 TAKE 1 CAPSULE DAILY 90 capsule 3    diphenhydrAMINE (BENADRYL) 25 mg capsule Take 25 mg by mouth every 12 (twelve) hours as needed for  Itching.      ferric citrate (AURYXIA) 210 mg iron Tab Take 2 tablets by mouth 3 (three) times daily with meals.      gabapentin (NEURONTIN) 100 MG capsule Take 100 mg by mouth 2 (two) times daily. HCS      hydrOXYzine HCL (ATARAX) 10 MG Tab Take 1 tablet (10 mg total) by mouth 3 (three) times daily as needed. 15 tablet 2    levothyroxine (SYNTHROID) 88 MCG tablet TAKE 1 TABLET BEFORE BREAKFAST 90 tablet 3    promethazine (PHENERGAN) 25 MG tablet TAKE 1 TABLET(25 MG) BY MOUTH TWICE DAILY AS NEEDED FOR NAUSEA (Patient taking differently: Take 25 mg by mouth 2 (two) times daily as needed for Nausea.) 60 tablet 0    rOPINIRole (REQUIP) 0.25 MG tablet Take 0.25 mg by mouth 3 (three) times daily. 1 tablet in the morning and 2 tablets at night      sertraline (ZOLOFT) 50 MG tablet Take 1 tablet (50 mg total) by mouth every evening. Take 50 mg by mouth every evening. 90 tablet 1    cefUROXime (CEFTIN) 250 MG tablet Take 250 mg by mouth 2 (two) times daily.      cholestyramine/aspartame (QUESTRAN AND AQUAPHOR TOPICAL COMPOUND) Apply to affected area twice daily 1 Container 1    doxycycline (VIBRA-TABS) 100 MG tablet Take 100 mg by mouth 2 (two) times daily.      HYDROcodone-acetaminophen (NORCO) 5-325 mg per tablet Take 1 tablet by mouth every 8 (eight) hours as needed for Pain. 221 tablet 0    miconazole (MICOTIN) 2 % cream Apply topically 2 (two) times daily. 28 g 0       Review of Systems:      Physical Exam  Constitutional:       Appearance: She is well-developed. She is not diaphoretic.   HENT:      Head: Normocephalic and atraumatic.   Eyes:      General: No scleral icterus.     Pupils: Pupils are equal, round, and reactive to light.   Cardiovascular:      Rate and Rhythm: Normal rate and regular rhythm.   Pulmonary:      Effort: Pulmonary effort is normal. No respiratory distress.      Breath sounds: No stridor.   Abdominal:      General: There is no distension.      Palpations: Abdomen is soft.   Musculoskeletal:          General: No deformity. Normal range of motion.      Cervical back: Neck supple.   Skin:     General: Skin is warm and dry.      Findings: No erythema or rash.   Neurological:      Mental Status: She is alert and oriented to person, place, and time.      Cranial Nerves: No cranial nerve deficit.   Psychiatric:         Behavior: Behavior normal.         Results:  Recent Labs   Lab 04/23/22  1055 04/24/22  0536 04/25/22  0524    141 137   K 4.7 4.2 4.5    105 106   CO2 24 26 20*   BUN 15 23 28*   CREATININE 4.4* 4.8* 5.2*   ESTGFRAFRICA 10.8* 9.8* 8.9*   EGFRNONAA 9.4* 8.5* 7.7*   GLU 70 76 84       Recent Labs   Lab 04/23/22  1055 04/24/22  0536 04/25/22  0524   CALCIUM 8.0* 8.4* 8.1*   ALBUMIN 3.2*  --   --        Recent Labs   Lab 11/15/19  0748 01/22/20  0848 05/25/21  0847   Hemoglobin A1C Invalid A SEE COMMENT 4.6       Recent Labs   Lab 04/23/22  1055 04/24/22  0536 04/25/22  0524   WBC 5.55 4.56 5.57   HGB 10.6* 11.0* 10.9*   HCT 33.2* 33.9* 33.8*   * 113* 112*   * 114* 116*   MCHC 31.9* 32.4 32.2   MONO 8.1  0.5 9.0  0.4 9.5  0.5       Recent Labs   Lab 04/23/22  1055   BILITOT 0.7   PROT 6.0   ALBUMIN 3.2*   ALKPHOS 85   ALT 10   AST 19       Recent Labs   Lab 03/15/21  1533 05/17/21  1819 01/18/22  0331 04/23/22  1727   Color, UA Yellow Yellow Yellow Yellow  Yellow   Appearance, UA Cloudy A Hazy A Clear Cloudy A  Cloudy A   pH, UA 7.0 8.0 7.0 8.0  8.0   Specific [place], UA 1.015 1.015 1.010 1.015  1.015   Protein, UA 2+ A 2+ A Trace A 2+ A  2+ A   Glucose, UA Negative Negative Negative Negative  Negative   Ketones, UA Negative Negative Negative Negative  Negative   Urobilinogen, UA Negative Negative Negative Negative  Negative   Bilirubin (UA) Negative Negative Negative Negative  Negative   Occult Blood UA 2+ A 2+ A 2+ A 2+ A  2+ A   Nitrite, UA Negative Negative Negative Positive A  Positive A   RBC, UA 21 H 7 H 10 H 35 H   WBC, UA >100 H 80 H 50 H >100 H    Bacteria Negative Few A  --  Many A   Hyaline Casts, UA 40 A 0  --  23 A     I have spent > minutes providing care for this patient for the above diagnoses. These services have included chart/data/imaging review, evaluation, exam, formulation of plan, , note preparation, and discussions with staff involved in this patient's care.    Jorge Webb MD  [facility] [address] [address]  [phone] (p)  [phone] (f)

## 2022-04-25 NOTE — PROGRESS NOTES
04/25/22 1245   Post-Hemodialysis Assessment   Rinseback Volume (mL) 250 mL   Blood Volume Processed (Liters) 71 L   Dialyzer Clearance Lightly streaked   Duration of Treatment (minutes) 180 minutes   Additional Fluid Intake (mL) 500 mL   Total UF (mL) 2500 mL   Net Fluid Removal 2000   Patient Response to Treatment tolerated well   Post-Treatment Weight 66.3 kg (146 lb 2.6 oz)   Treatment Weight Change -2   Arterial bleeding stop time (min) 6 min   Venous bleeding stop time (min) 6 min   Post-Hemodialysis Comments no problems

## 2022-04-25 NOTE — PROGRESS NOTES
[facility]  Progress Note    Patient Name: Shara Hutchins  MRN: [id number]  Patient Class: OP- Observation   Admission Date: 4/23/2022  Length of Stay: 0 days  Attending Physician: Johnathan Carbajal MD  Primary Care Provider: April Horton MD        Subjective:     Principal Problem:Vulvar cellulitis      Overview/Hospital Course:  72-year-old female with history of vulvar cellulitis in the setting of prior irradiation for vulvar cancer in 2019 and prior admissions for similar symptoms admitted for recurrent vulvar cellulitis.  Initiated on vancomycin and ceftriaxone.  Being followed by infectious disease.  Nephrology following for ESRD on HD.      Interval History:  No acute overnight events reported.  She reports no significant change in vulvar erythema, swelling and pain.  Chronic diarrhea is about the same.  Nausea is improved.    Review of Systems: As per interval history  Objective:     Vital Signs (Most Recent):  Temp: 98.1 °F (36.7 °C) (04/25/22 0925)  Pulse: 69 (04/25/22 1100)  Resp: 18 (04/25/22 0925)  BP: 130/67 (04/25/22 1100)  SpO2: (!) 94 % (04/25/22 0925)   Vital Signs (24h Range):  Temp:  [98.1 °F (36.7 °C)-98.5 °F (36.9 °C)] 98.1 °F (36.7 °C)  Pulse:  [64-78] 69  Resp:  [15-20] 18  SpO2:  [93 %-95 %] 94 %  BP: (117-175)/(59-90) 130/67     Weight: 68.3 kg (150 lb 9.2 oz)  Body mass index is 23.24 kg/m².    Intake/Output Summary (Last 24 hours) at 4/25/2022 1123  Last data filed at 4/25/2022 1009  Gross per 24 hour   Intake 1340 ml   Output 1400 ml   Net -60 ml        Physical Exam  General: Patient resting comfortably in no acute distress.  Eyes: No conjunctival pallor. No scleral icterus.  Lungs: CTA. Good air entry.  Cor: Regular rate and rhythm. No murmurs. No pedal edema.  Abd: Soft. Nontender.   Musculoskeletal: No arthropathy, deformity.  Skin: No rashes, swelling, or erythema.  Neuro: A&O x3. Moving all extremities equally  Ext: LUE AVF noted. Peripheral pulses  +   exam has been deferred    Significant Labs: All pertinent labs within the past 24 hours have been reviewed.  CBC:   Recent Labs   Lab 04/24/22  0536 04/25/22  0524   WBC 4.56 5.57   HGB 11.0* 10.9*   HCT 33.9* 33.8*   * 112*       CMP:   Recent Labs   Lab 04/24/22  0536 04/25/22  0524    137   K 4.2 4.5    106   CO2 26 20*   GLU 76 84   BUN 23 28*   CREATININE 4.8* 5.2*   CALCIUM 8.4* 8.1*   ANIONGAP 10 11   EGFRNONAA 8.5* 7.7*         Significant Imaging: I have reviewed all pertinent imaging results/findings within the past 24 hours.      Assessment/Plan:      * Vulvar cellulitis  Recurrent cellulitis vs lymphedema of vulvar area - has history of radiation for hx of vulvar cancer   Start IV antibiotics including vancomycin and ceftriaxone   Status post single dose of oral fluconazole for candida prophylaxis   Topical steroid and anesthetic for perineal discomfort   Consult Dr. 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  Monitor   Use prn loperamide       Anemia of chronic disease  Likely from ESRD  Daily CBC         VTE Risk Mitigation (From admission, onward)         Ordered     enoxaparin injection 30 mg  Every 24 hours (non-standard times)         04/23/22 1412     IP VTE HIGH RISK PATIENT  Once         04/23/22 1407     Place sequential compression device  Until discontinued         04/23/22 1407                Discharge Planning   RODRIGO:      Code Status: Full Code   Is the patient medically ready for discharge?:     Reason for patient still in hospital (select all that apply): Patient trending condition, Treatment and Consult recommendations  Discharge Plan A: Assisted Living                  Johnathan Carbajal MD  Department of Hospital Medicine   [facility]

## 2022-04-25 NOTE — ASSESSMENT & PLAN NOTE
Recurrent cellulitis vs lymphedema of vulvar area - has history of radiation for hx of vulvar cancer   Start IV antibiotics including vancomycin and ceftriaxone   Status post single dose of oral fluconazole for candida prophylaxis   Topical steroid and anesthetic for perineal discomfort   Consult Dr. Neal from ID - appreciate input

## 2022-04-25 NOTE — PLAN OF CARE
Medicare Outpatient Observation Notice was signed, explained and given to patient/caregiver on 04/25/2022 at 8:46am     addressed any questions or concerns.    Medicare Outpatient Observation Notice will be scanned into patient's medical record

## 2022-04-25 NOTE — PLAN OF CARE
Important Message from Medicare was sign, explained and given to patient/caregiver on 04/28/2022 at 2:08pm     addressed any questions or concerns.    Important Message from Medicare document will be scanned into patient's medical record

## 2022-04-25 NOTE — TELEPHONE ENCOUNTER
[facility]   Questioning amount of Hydrocodone prescription sent to pharmacy on 4/12/2022.   Instructed patient has been admitted to [facility].  Will update when she is discharged

## 2022-04-25 NOTE — CONSULTS
Consult Note  Infectious Disease    Reason for Consult:  Vulvar cellulitis    HPI: Shara Hutchins is a 72 y.o. female well known to me last seen in January    She presented on 04/23 with a history that approximately 5-6 weeks ago she felt as though she had developed a recurrence of vulvar cellulitis.  I have given her oral cefuroxime to be again in the circumstances and she did so but did not notify me and did not experience any improvement.   She also had right and does have stents, but no dysuria.  She had persistent increased pain, sloughing of some of the skin over the labia bilaterally, and so much swelling that her stools were only leaking and she has chronic diarrhea.  She gave herself Imodium to reduce the output and subsequent swelling and irritation of the perineum and inner buttocks.  She had no fever or chills at any time.  She has lymphedema of the vulva and perineum secondary to prior radiation for vulvar cancer.  White blood cells were normal on admission.  CT scan of the abdomen and pelvis showed a large volume of stool and gas in the colon, nonspecific thickening of the vulvovaginal skin suggestive of cellulitis known right-sided nephrolithiasis and a right-sided double-J stent (last changed January 18th, next follow-up July 18) and unchanged biliary ductal dilatation.  She was admitted to Hospital Medicine and placed on vancomycin and ceftriaxone.  Urine culture from admission shows a Gram-negative estelle.  She is on dialysis, makes a small amount of urine, urinalysis had greater than 100 white blood cells per high-power field but  this was not collected in a sterile manner.      Review of patient's allergies indicates:   Allergen Reactions    Ciprofloxacin Other (See Comments)     Elevated liver enzymes      Pcn [penicillins] Anaphylaxis     TOLERATES ROCEPHIN WITHOUT DIFFICULTY     Past Medical History:   Diagnosis Date    Cellulitis of trunk     Perineum, numerous episodes    Chronic diarrhea  02/10/2020    Short-gut syndrome    Diabetes mellitus, type 2     pt has not had for years after losing weight    End stage renal disease on dialysis 01/07/2018    Fatigue     Hypertension     Hypothyroidism 2/10/2020    Iron deficiency anemia due to chronic blood loss 1/7/2018    Pulmonary hypertension 3/20/2020    Vulvar cancer 1/7/2019     Past Surgical History:   Procedure Laterality Date    ABDOMINAL SURGERY      ANGIOGRAPHY OF ARTERIOVENOUS SHUNT N/A 1/19/2022    Procedure: Fistulogram with Possible Intervention;  Surgeon: Raz Maher MD;  Location: [facility] CATH/EP LAB;  Service: Cardiothoracic;  Laterality: N/A;    APPENDECTOMY      AV FISTULA PLACEMENT Left 2018    BACK SURGERY      BLADDER FULGURATION N/A 6/16/2020    Procedure: FULGURATION, BLADDER;  Surgeon: Damari Barber Jr., MD;  Location: [facility] OR;  Service: Urology;  Laterality: N/A;    carpel tunnel surgery once on each hand      CERVICAL FUSION      x 4    cervix repair      CHOLECYSTECTOMY  2000    COLECTOMY      CYSTOSCOPY W/ RETROGRADES  6/16/2020    Procedure: CYSTOSCOPY, WITH RETROGRADE PYELOGRAM;  Surgeon: Damari Barber Jr., MD;  Location: [facility] OR;  Service: Urology;;    CYSTOSCOPY W/ RETROGRADES Right 4/20/2021    Procedure: CYSTOSCOPY, WITH RETROGRADE PYELOGRAM;  Surgeon: Damari Barber Jr., MD;  Location: [facility] OR;  Service: Urology;  Laterality: Right;    CYSTOSCOPY W/ URETERAL STENT PLACEMENT Right 3/16/2021    Procedure: CYSTOSCOPY, WITH URETERAL STENT INSERTION;  Surgeon: Damari Barber Jr., MD;  Location: [facility];  Service: Urology;  Laterality: Right;    CYSTOSCOPY W/ URETERAL STENT PLACEMENT Right 5/18/2021    Procedure: CYSTOSCOPY, WITH URETERAL STENT INSERTION;  Surgeon: Damari Barber Jr., MD;  Location: [facility] OR;  Service: Urology;  Laterality: Right;    CYSTOSCOPY W/ URETERAL STENT REMOVAL Right 4/20/2021    Procedure: CYSTOSCOPY, WITH URETERAL STENT REMOVAL;  Surgeon: Damari Barber Jr., MD;  Location:  [facility] OR;  Service: Urology;  Laterality: Right;    CYSTOSCOPY WITH URETEROSCOPY, RETROGRADE PYELOGRAPHY, AND INSERTION OF STENT Right 3/30/2021    Procedure: CYSTOSCOPY, WITH RETROGRADE PYELOGRAM AND URETERAL STENT INSERTION;  Surgeon: Damari Barber Jr., MD;  Location: [facility] OR;  Service: Urology;  Laterality: Right;    CYSTOURETEROSCOPY WITH RETROGRADE PYELOGRAPHY AND INSERTION OF STENT INTO URETER Right 1/18/2022    Procedure: CYSTOURETEROSCOPY, WITH RETROGRADE PYELOGRAM AND URETERAL STENT INSERTION;  Surgeon: Damari Barber Jr., MD;  Location: [facility] OR;  Service: Urology;  Laterality: Right;    DIAGNOSTIC LAPAROSCOPY N/A 2/13/2020    Procedure: LAPAROSCOPY, DIAGNOSTIC;  Surgeon: Robbi Lovell III, MD;  Location: [facility] OR;  Service: General;  Laterality: N/A;    HYSTERECTOMY  1980    ILEOSTOMY N/A 2/13/2020    Procedure: CREATION, ILEOSTOMY Loop;  Surgeon: Robbi Lovell III, MD;  Location: [facility] OR;  Service: General;  Laterality: N/A;    ILEOSTOMY CLOSURE N/A 5/18/2020    Procedure: CLOSURE, ILEOSTOMY;  Surgeon: Robbi Lovell III, MD;  Location: [facility] OR;  Service: General;  Laterality: N/A;    LASER LITHOTRIPSY Right 3/30/2021    Procedure: LITHOTRIPSY, USING LASER;  Surgeon: Damari Barber Jr., MD;  Location: [facility] OR;  Service: Urology;  Laterality: Right;    LUMBAR LAMINECTOMY      LYSIS OF ADHESIONS N/A 2/13/2020    Procedure: LYSIS, ADHESIONS;  Surgeon: Robbi Lovell III, MD;  Location: [facility] OR;  Service: General;  Laterality: N/A;    NECK SURGERY      PERCUTANEOUS TRANSLUMINAL ANGIOPLASTY OF ARTERIOVENOUS FISTULA N/A 1/19/2022    Procedure: PTA, AV FISTULA;  Surgeon: Raz Maher MD;  Location: [facility] CATH/EP LAB;  Service: Cardiothoracic;  Laterality: N/A;    PHLEBOGRAPHY  2/28/2020    Procedure: VENOGRAM;  Surgeon: Robbi Lovell III, MD;  Location: [facility] OR;  Service: General;;    REMOVAL OF VASCULAR ACCESS PORT Right 2/13/2020    Procedure: REMOVAL, VASCULAR ACCESS  PORT;  Surgeon: Robbi Lovell III, MD;  Location: [facility] OR;  Service: General;  Laterality: Right;    RETROGRADE PYELOGRAPHY  3/16/2021    Procedure: PYELOGRAM, RETROGRADE;  Surgeon: Damari Barber Jr., MD;  Location: [facility] OR;  Service: Urology;;    RETROGRADE PYELOGRAPHY Right 2021    Procedure: PYELOGRAM, RETROGRADE;  Surgeon: Damari Barber Jr., MD;  Location[facility] OR;  Service: Urology;  Laterality: Right;    SHOULDER SURGERY      URETEROSCOPIC REMOVAL OF URETERIC CALCULUS Right 3/30/2021    Procedure: REMOVAL, CALCULUS, URETER, URETEROSCOPIC;  Surgeon: Damari Barber Jr., MD;  Location: Health system OR;  Service: Urology;  Laterality: Right;    URETEROSCOPIC REMOVAL OF URETERIC CALCULUS Right 2021    Procedure: REMOVAL, CALCULUS, URETER, URETEROSCOPIC;  Surgeon: Damari Barber Jr., MD;  Location: Health system OR;  Service: Urology;  Laterality: Right;    vulva cancer       Social History     Socioeconomic History    Marital status:    Tobacco Use    Smoking status: Former Smoker     Packs/day: 1.00     Years: 33.00     Pack years: 33.00     Types: Cigarettes     Quit date: 2000     Years since quittin.3    Smokeless tobacco: Former User     Quit date:    Substance and Sexual Activity    Alcohol use: No    Drug use: No     Family History   Problem Relation Age of Onset    Heart disease Mother     Hypertension Mother     Cancer Mother         lung    Heart disease Father     Heart disease Sister         Open heart surgery    No Known Problems Daughter          Review of Systems:   No chills, fever, sweats,    No change in vision,    No sinus congestion,   No pain in mouth or throat. No problems with teeth, gums.  No chest pain,    No   shortness of breath,    No nausea, vomiting, blood in stool, or focal abd pain,  No dysphagia, odynophagia  No dysuria, hematuria,  but does have incontinence     The itching that she experienced was after the antibiotics being placed on here  No  swelling of joints, redness of joints, injuries,   No unusual headaches,  falls  On treatment for depression Zoloft is highly functioning despite incredible medical problems and challenges  No diabetes   No bleeding, lymphadenopathy, , unusual bruising  No recent/prior steroids     Antibiotic History:  See HPI    EXAM & DIAGNOSTICS REVIEWED:   Vitals:     Temp:  [98.1 °F (36.7 °C)-98.5 °F (36.9 °C)]   Temp: 98.1 °F (36.7 °C) (04/25/22 0925)  Pulse: 68 (04/25/22 1000)  Resp: 18 (04/25/22 0925)  BP: 130/69 (04/25/22 1000)  SpO2: (!) 94 % (04/25/22 0925)    Intake/Output Summary (Last 24 hours) at 4/25/2022 1032  Last data filed at 4/25/2022 1009  Gross per 24 hour   Intake 1340 ml   Output 1525 ml   Net -185 ml       General:  In NAD. Alert and attentive, cooperative, uncomfortable  Eyes:  Anicteric, PERRL, EOMI  ENT:  No ulcers, exudates, thrush, nares patent, dentures  Neck:  supple, no masses or adenopathy appreciated  Lungs: Clear, no consolidation, rales, wheezes, rub  Heart:  RRR, no gallop/murmur/rub noted  Abd:  Soft, NT, ND, normal BS, no masses or organomegaly appreciated.  :  Mons pubis, labia majora, perineum, inner buttocks are all very very edematous with acute.  There small erosions on the anterior surfaces of the labia.  These are not herpetic in quality.  There are no exudates, or signs candidiasis.  There no physical signs of a necrotizing soft tissue infection.  The anus is visible but there is tremendous swelling in this area.  there is erythema extending between the buttocks due to chronic leakage  Musc:  Joints without effusion, swelling, erythema, synovitis, muscle wasting.   Skin:  No rashes.    Neuro:             Alert, attentive, speech fluent, face symmetric, moves all extremities, no focal weakness. Ambulatory  Psych:  Calm, cooperative     Extrem: No edema, erythema, phlebitis, cellulitis, warm and well perfused  VAD:   Left dialysis fistula    Isolation:     Wound:       Lines/Tubes/Drains:    General Labs reviewed:  Recent Labs   Lab 04/23/22  1055 04/24/22  0536 04/25/22  0524   WBC 5.55 4.56 5.57   HGB 10.6* 11.0* 10.9*   HCT 33.2* 33.9* 33.8*   * 113* 112*       Recent Labs   Lab 04/23/22  1055 04/24/22  0536 04/25/22  0524    141 137   K 4.7 4.2 4.5    105 106   CO2 24 26 20*   BUN 15 23 28*   CREATININE 4.4* 4.8* 5.2*   CALCIUM 8.0* 8.4* 8.1*   PROT 6.0  --   --    BILITOT 0.7  --   --    ALKPHOS 85  --   --    ALT 10  --   --    AST 19  --   --      No results for input(s): CRP in the last 168 hours.      Micro:  Microbiology Results (last 7 days)     Procedure Component Value Units Date/Time    Urine culture [[id number]]  (Abnormal) Collected: 04/23/22 1727    Order Status: Completed Specimen: Urine Updated: 04/24/22 0723     Urine Culture, Routine GRAM NEGATIVE CRISTHIAN, NON-LACTOSE   >100,000 cfu/ml  Identification and susceptibility pending      Narrative:      Specimen Source->Urine          Imaging Reviewed:   CXR   CT abdomen and pelvis  . Large volume of stool and gas in the colon with a nonobstructive bowel gas pattern, consider correlation for constipation.  2. Nonspecific thickening of the vulvovaginal skin suggestive of cellulitis, consider correlation with physical exam. No finding of abscess or gas forming infection (necrotizing fasciitis/Iman's gangrene).  3. Circumferential bladder wall thickening, consider correlation with urinalysis for cystitis.  4. Right-sided double-J ureteral stent in expected configuration without hydronephrosis.  5. Stones in the right mid and distal ureter.  6. Numerous additional bilateral nonobstructing renal stones, and findings compatible with medullary nephrocalcinosis.  7. Unchanged mild central intrahepatic and extrahepatic biliary ductal dilation.  8. Additional and incidental findings as above, similar to the previous exam    Cardiology:    IMPRESSION & PLAN   1.  Acute edema and  erythema complicating chronic lymphedema the mons pubis, vulva perineum and inner buttocks.  Am not convinced that this reflects bacterial cellulitis   History of vulvar cancer and radiation    2.  Incontinence urine and stool complicating skin care  3.  Positive urine culture, no doubt contaminated, abnormal urinalysis as well, with right-sided ureteral stent in place and known nephrolithiasis  4.  End-stage renal disease on dialysis  5.  Chronic diarrhea due to short gut syndrome      Recommendations:  Continue vancomycin and ceftriaxone.  It is likely that the Gram-negative estelle in may be cephalosporin resistant  Adding a topical steroid and anesthetic to perineal skin care  Wound care consult in progress      Medical Decision Making during this encounter was  [_] Low Complexity  [_] Moderate Complexity  [xx  ] High Complexity

## 2022-04-25 NOTE — SUBJECTIVE & OBJECTIVE
Interval History:  No acute overnight events reported.  She reports no significant change in vulvar erythema, swelling and pain.  Chronic diarrhea is about the same.  Nausea is improved.    Review of Systems: As per interval history  Objective:     Vital Signs (Most Recent):  Temp: 98.1 °F (36.7 °C) (04/25/22 0925)  Pulse: 69 (04/25/22 1100)  Resp: 18 (04/25/22 0925)  BP: 130/67 (04/25/22 1100)  SpO2: (!) 94 % (04/25/22 0925)   Vital Signs (24h Range):  Temp:  [98.1 °F (36.7 °C)-98.5 °F (36.9 °C)] 98.1 °F (36.7 °C)  Pulse:  [64-78] 69  Resp:  [15-20] 18  SpO2:  [93 %-95 %] 94 %  BP: (117-175)/(59-90) 130/67     Weight: 68.3 kg (150 lb 9.2 oz)  Body mass index is 23.24 kg/m².    Intake/Output Summary (Last 24 hours) at 4/25/2022 1123  Last data filed at 4/25/2022 1009  Gross per 24 hour   Intake 1340 ml   Output 1400 ml   Net -60 ml        Physical Exam  General: Patient resting comfortably in no acute distress.  Eyes: No conjunctival pallor. No scleral icterus.  Lungs: CTA. Good air entry.  Cor: Regular rate and rhythm. No murmurs. No pedal edema.  Abd: Soft. Nontender.   Musculoskeletal: No arthropathy, deformity.  Skin: No rashes, swelling, or erythema.  Neuro: A&O x3. Moving all extremities equally  Ext: LUE AVF noted. Peripheral pulses +   exam has been deferred    Significant Labs: All pertinent labs within the past 24 hours have been reviewed.  CBC:   Recent Labs   Lab 04/24/22  0536 04/25/22 0524   WBC 4.56 5.57   HGB 11.0* 10.9*   HCT 33.9* 33.8*   * 112*       CMP:   Recent Labs   Lab 04/24/22  0536 04/25/22  0524    137   K 4.2 4.5    106   CO2 26 20*   GLU 76 84   BUN 23 28*   CREATININE 4.8* 5.2*   CALCIUM 8.4* 8.1*   ANIONGAP 10 11   EGFRNONAA 8.5* 7.7*         Significant Imaging: I have reviewed all pertinent imaging results/findings within the past 24 hours.

## 2022-04-26 NOTE — PROGRESS NOTES
INPATIENT NEPHROLOGY Progress Note  [facility]    Patient Name: Shara Hutchins  Date: 04/26/2022    Reason for consultation: ESRD    Chief Complaint:   Chief Complaint   Patient presents with    Vaginal Pain     X 3 WEEKS, STATES VAGINAL  PAIN AND SWELLING       History of Present Illness:  71 y/o F with ESRD on HD MWF, short-gut syndrome with chronic diarrhea, h/o vulvar cancer s/p radiation with multiple episodes cellulitis of perineum, and pHTN. Patient experiences recurrences of vulvar cellulitis several times per year. She felt another episode coming on several weeks ago, and filled her PRN Rx Keflex by her ID provider. She has taken keflex for 3 weeks with worsening pain and infection of perineum. This region is so swollen, it causes urine and feces to go up her back when she eliminates.  Associated with nausea without vomiting.  Denies fever, chills, abdominal pain, dysuria, confusion.  She makes urine. Denies exac/reliev factors. Consulted for dialysis.    Interval History:  4/23- no issues with HD yest- comfortable on exam, vitals stable, reports taking medication to prevent diarrhea, does endorse some urinary discomfort    CT AP  1. Large volume of stool and gas in the colon with a nonobstructive bowel gas pattern, consider correlation for constipation.  2. Nonspecific thickening of the vulvovaginal skin suggestive of cellulitis, consider correlation with physical exam. No finding of abscess or gas forming infection (necrotizing fasciitis/Iman's gangrene).  3. Circumferential bladder wall thickening, consider correlation with urinalysis for cystitis.  4. Right-sided double-J ureteral stent in expected configuration without hydronephrosis.  5. Stones in the right mid and distal ureter.  6. Numerous additional bilateral nonobstructing renal stones, and findings compatible with medullary nephrocalcinosis.  7. Unchanged mild central intrahepatic and extrahepatic biliary ductal  dilation.    4/24- getting IV antbx, still with some pain  4/25 VSS, Seen on HD today. Appreciate ID input.  4/26 VSS, no new complains. HD in AM.    Plan of Care:    Vulvar cellulitis vs edema, GNR UTI  ESRD on HD MWF  HTN  Secondary HPT  Anemia of CKD    Plan:    - Dose antbx for dialysis/CrCl < 10. Appreciate ID input.  - Continue HD MWF.  - BP/VS are at goal- will UF as tolerated.  - Continue binders with meals.  - Ordered give ADAM with HD.    Thank you for allowing us to participate in this patient's care. We will continue to follow.    Vital Signs:  Temp Readings from Last 3 Encounters:   04/26/22 97.9 °F (36.6 °C) (Oral)   02/08/22 98.8 °F (37.1 °C)   01/26/22 98 °F (36.7 °C)       Pulse Readings from Last 3 Encounters:   04/26/22 75   02/08/22 74   01/26/22 77       BP Readings from Last 3 Encounters:   04/26/22 (!) 154/72   02/08/22 120/68   01/26/22 136/73       Weight:  Wt Readings from Last 3 Encounters:   04/25/22 68.3 kg (150 lb 9.2 oz)   02/08/22 64.4 kg (142 lb)   01/18/22 68.3 kg (150 lb 9.2 oz)     Medications:  Scheduled Meds:  Continuous Infusions:  PRN Meds:.  Current Facility-Administered Medications on File Prior to Encounter   Medication Dose Route Frequency Provider Last Rate Last Admin    electrolyte-S (ISOLYTE)   Intravenous Continuous Raz Ruff MD        lactated ringers infusion   Intravenous Continuous Ruy Beltre MD        LIDOcaine (PF) 10 mg/ml (1%) injection 5 mg  0.5 mL Intradermal Once Raz Ruff MD         Current Outpatient Medications on File Prior to Encounter   Medication Sig Dispense Refill    acetaminophen (TYLENOL EXTRA STRENGTH) 500 MG tablet Take 1,000 mg by mouth daily as needed for Pain.      cyclobenzaprine (FLEXERIL) 10 MG tablet TAKE ONE TABLET BY MOUTH THREE TIMES DAILY AS NEEDED FOR MUSCLE SPASMS 30 tablet 1    diltiaZEM (CARDIZEM CD) 120 MG Cp24 TAKE 1 CAPSULE DAILY 90 capsule 3    diphenhydrAMINE (BENADRYL) 25 mg capsule Take 25 mg by  mouth every 12 (twelve) hours as needed for Itching.      ferric citrate (AURYXIA) 210 mg iron Tab Take 2 tablets by mouth 3 (three) times daily with meals.      gabapentin (NEURONTIN) 100 MG capsule Take 100 mg by mouth 2 (two) times daily. HCS      hydrOXYzine HCL (ATARAX) 10 MG Tab Take 1 tablet (10 mg total) by mouth 3 (three) times daily as needed. 15 tablet 2    levothyroxine (SYNTHROID) 88 MCG tablet TAKE 1 TABLET BEFORE BREAKFAST 90 tablet 3    promethazine (PHENERGAN) 25 MG tablet TAKE 1 TABLET(25 MG) BY MOUTH TWICE DAILY AS NEEDED FOR NAUSEA (Patient taking differently: Take 25 mg by mouth 2 (two) times daily as needed for Nausea.) 60 tablet 0    rOPINIRole (REQUIP) 0.25 MG tablet Take 0.25 mg by mouth 3 (three) times daily. 1 tablet in the morning and 2 tablets at night      sertraline (ZOLOFT) 50 MG tablet Take 1 tablet (50 mg total) by mouth every evening. Take 50 mg by mouth every evening. 90 tablet 1    cefUROXime (CEFTIN) 250 MG tablet Take 250 mg by mouth 2 (two) times daily.      cholestyramine/aspartame (QUESTRAN AND AQUAPHOR TOPICAL COMPOUND) Apply to affected area twice daily 1 Container 1    doxycycline (VIBRA-TABS) 100 MG tablet Take 100 mg by mouth 2 (two) times daily.      HYDROcodone-acetaminophen (NORCO) 5-325 mg per tablet Take 1 tablet by mouth every 8 (eight) hours as needed for Pain. 221 tablet 0    miconazole (MICOTIN) 2 % cream Apply topically 2 (two) times daily. 28 g 0       Review of Systems:      Physical Exam  Constitutional:       Appearance: She is well-developed. She is not diaphoretic.   HENT:      Head: Normocephalic and atraumatic.   Eyes:      General: No scleral icterus.     Pupils: Pupils are equal, round, and reactive to light.   Cardiovascular:      Rate and Rhythm: Normal rate and regular rhythm.   Pulmonary:      Effort: Pulmonary effort is normal. No respiratory distress.      Breath sounds: No stridor.   Abdominal:      General: There is no distension.       Palpations: Abdomen is soft.   Musculoskeletal:         General: No deformity. Normal range of motion.      Cervical back: Neck supple.   Skin:     General: Skin is warm and dry.      Findings: No erythema or rash.   Neurological:      Mental Status: She is alert and oriented to person, place, and time.      Cranial Nerves: No cranial nerve deficit.   Psychiatric:         Behavior: Behavior normal.         Results:  Recent Labs   Lab 04/24/22  0536 04/25/22  0524 04/26/22  0438    137 136   K 4.2 4.5 3.7    106 109   CO2 26 20* 21*   BUN 23 28* 20   CREATININE 4.8* 5.2* 3.9*   ESTGFRAFRICA 9.8* 8.9* 12.5*   EGFRNONAA 8.5* 7.7* 10.9*   GLU 76 84 78       Recent Labs   Lab 04/23/22  1055 04/24/22  0536 04/25/22  0524 04/26/22  0438   CALCIUM 8.0* 8.4* 8.1* 8.1*   ALBUMIN 3.2*  --   --   --        Recent Labs   Lab 11/15/19  0748 01/22/20  0848 05/25/21  0847   Hemoglobin A1C Invalid A SEE COMMENT 4.6       Recent Labs   Lab 04/24/22  0536 04/25/22  0524 04/26/22  0438   WBC 4.56 5.57 4.97   HGB 11.0* 10.9* 10.6*   HCT 33.9* 33.8* 33.7*   * 112* 92*   * 116* 117*   MCHC 32.4 32.2 31.5*   MONO 9.0  0.4 9.5  0.5 7.6  0.4       Recent Labs   Lab 04/23/22  1055   BILITOT 0.7   PROT 6.0   ALBUMIN 3.2*   ALKPHOS 85   ALT 10   AST 19       Recent Labs   Lab 03/15/21  1533 05/17/21  1819 01/18/22  0331 04/23/22  1727   Color, UA Yellow Yellow Yellow Yellow  Yellow   Appearance, UA Cloudy A Hazy A Clear Cloudy A  Cloudy A   pH, UA 7.0 8.0 7.0 8.0  8.0   Specific [place], UA 1.015 1.015 1.010 1.015  1.015   Protein, UA 2+ A 2+ A Trace A 2+ A  2+ A   Glucose, UA Negative Negative Negative Negative  Negative   Ketones, UA Negative Negative Negative Negative  Negative   Urobilinogen, UA Negative Negative Negative Negative  Negative   Bilirubin (UA) Negative Negative Negative Negative  Negative   Occult Blood UA 2+ A 2+ A 2+ A 2+ A  2+ A   Nitrite, UA Negative Negative Negative Positive A   Positive A   RBC, UA 21 H 7 H 10 H 35 H   WBC, UA >100 H 80 H 50 H >100 H   Bacteria Negative Few A  --  Many A   Hyaline Casts, UA 40 A 0  --  23 A     I have spent > minutes providing care for this patient for the above diagnoses. These services have included chart/data/imaging review, evaluation, exam, formulation of plan, , note preparation, and discussions with staff involved in this patient's care.    Jorge Webb MD  [facility] [address] [address]  [phone] (p)  [phone] (f)

## 2022-04-26 NOTE — PROGRESS NOTES
[facility]    Progress Note    Patient Name: Shara Hutchins  MRN: [id number]  Patient Class: IP- Inpatient   Admission Date: 4/23/2022  9:21 AM  Length of Stay: 1  Attending Physician: Brenda Stewart MD  Primary Care Provider: April Horton MD  Face-to-Face encounter date: 04/26/2022  Code status:  Chief Complaint: Vaginal Pain (X 3 WEEKS, STATES VAGINAL  PAIN AND SWELLING)        Subjective:    HPI:Per Sara GARCIA  Shara Hutchins is a 72 year old female  presents to  ED with vulvar cellulitis that is recurrent. Last episode 1/2022 . Required hospitalization  The patient states that about  2 weeks ago she developed pain and itching. She took keflex but symptoms have worsened   She has  hx vulvar cancer S/P  radiation and has since had recurrent episodes of cellulitis   Patient also w/DM 2 , ESRD MWF , HTN hypothyroid .  Patient states she has had increased pain., chronic diarrhea, no fever no chills no chest pain no shortness of breath.    Interval History:   4/25:Admitted for vulvar cellulitis. Reports no improvement in symptoms since admission. Pain is moderate to severe. Reports chronic diarrhea which is slightly improved now. Admits to nausea but no vomiting.     4/26:  Patient states that she is doing well however still sees blood in urine due to vulva excoriation.  Urine culture grew Klebsiella ESBL which is resistant to multiple medications but sensitive to levofloxacin, Bactrim and meropenem.  Patient is allergic to penicillins, and due to kidney function, status patient on Macrobid impaction.  Will renally dose IV levofloxacin and touch bases with ID concerning appropriate antibiotics to place patient on.  No concerns/issues overnight reported by the patient or the nursing staff.    Review of Systems All other Review of Systems were found to be negative expect for that mentioned already in HPI.     Objective:     Vitals:    04/26/22 0804 04/26/22 0825 04/26/22 1037 04/26/22 1201    BP: (!) 154/72   (!) 145/73   BP Location:       Patient Position:       Pulse: 75   70   Resp: 19 18 18 18   Temp: 97.9 °F (36.6 °C)   97.9 °F (36.6 °C)   TempSrc: Oral   Oral   SpO2: 96%   96%   Weight:       Height:            Vitals reviewed.  Constitutional: No distress.   HENT: NC  Head: Atraumatic.   Cardiovascular: Normal rate, regular rhythm and normal heart sounds.   Pulmonary/Chest: Effort normal. No wheezes.   Abdominal: Soft. Bowel sounds are normal. No distension and no mass. No tenderness  Neurological: Alert.   Skin: Skin is warm and dry.  vulval erythema extending from the labia to perineal region  Psych: Appropriate mood and affect    Following labs were Reviewed   CBC:  Recent Labs   Lab 04/26/22  0438   WBC 4.97   HGB 10.6*   HCT 33.7*   PLT 92*     CMP:  Recent Labs   Lab 04/26/22 0438   CALCIUM 8.1*      K 3.7   CO2 21*      BUN 20   CREATININE 3.9*       Micro Results  Microbiology Results (last 7 days)     Procedure Component Value Units Date/Time    Urine culture [[id number]]  (Abnormal)  (Susceptibility) Collected: 04/23/22 1727    Order Status: Completed Specimen: Urine Updated: 04/26/22 0653     Urine Culture, Routine KLEBSIELLA OXYTOCA ESBL  > 100,000 cfu/ml  Results called to and read back by Hellen Mauricio [id number]      Narrative:      Specimen Source->Urine           Radiology Reports  CT Abdomen Pelvis With Contrast  Result Date: 4/23/2022  IMPRESSION: 1. Large volume of stool and gas in the colon with a nonobstructive bowel gas pattern, consider correlation for constipation. 2. Nonspecific thickening of the vulvovaginal skin suggestive of cellulitis, consider correlation with physical exam. No finding of abscess or gas forming infection (necrotizing fasciitis/Iman's gangrene). 3. Circumferential bladder wall thickening, consider correlation with urinalysis for cystitis. 4. Right-sided double-J ureteral stent in expected configuration without hydronephrosis. 5.  Stones in the right mid and distal ureter. 6. Numerous additional bilateral nonobstructing renal stones, and findings compatible with medullary nephrocalcinosis. 7. Unchanged mild central intrahepatic and extrahepatic biliary ductal dilation. 8. Additional and incidental findings as above, similar to the previous exam. . Electronically signed by:  Rashi Wilder MD  4/23/2022 12:56 PM CDT Workstation: [id number]       Meds  Scheduled Meds:   diltiaZEM  120 mg Oral Daily    enoxparin  30 mg Subcutaneous Q24H    epoetin nica-ebpx (RETACRIT) injection  50 Units/kg Intravenous Every Mon, Wed, Fri    ferric citrate  2 tablet Oral TID WM    gabapentin  100 mg Oral BID    [START ON 4/28/2022] levoFLOXacin  500 mg Intravenous Q48H    levothyroxine  88 mcg Oral Before breakfast    nystatin-triamcinolone   Topical (Top) QID    rOPINIRole  0.25 mg Oral TID    sertraline  50 mg Oral QHS     Continuous Infusions:  PRN Meds:.acetaminophen, cyclobenzaprine, dextrose 50%, dextrose 50%, diphenhydrAMINE, glucagon (human recombinant), glucose, glucose, HYDROcodone-acetaminophen, LIDOcaine HCL 2%, loperamide, morphine, ondansetron, Pharmacy to dose Vancomycin consult **AND** vancomycin - pharmacy to dose.    Assessment & Plan:   * Vulvar cellulitis  Recurrent cellulitis vs lymphedema of vulvar area - has history of radiation for hx of vulvar cancer   Change IV ceftriaxone to IV levofloxacin  Status post single dose of oral fluconazole for candida prophylaxis   Topical steroid and anesthetic for perineal discomfort   Infectious Disease on board       ESRD on dialysis MWF  Patient with end-stage renal disease on dialysis Monday Wednesday Friday   Nephrology following      Chronic diarrhea  Secondary to short-gut syndrome  Monitor   Use prn loperamide         Anemia of chronic disease  Likely from ESRD  Daily CBC           Discharge Planning:   Is the patient medically ready for discharge?: no    Reason for patient still in  hospital (select all that apply): Patient trending condition and Treatment    Above encounter included review of the medical records, interviewing and examining the patient face-to-face, discussion with family and other health care providers, ordering and interpreting lab/test results and formulating a plan of care.     Medical Decision Making:      [_] Low Complexity  [_] Moderate Complexity  [x] High Complexity      Brenda Stewart MD  Department of Hospital Medicine   [facility]

## 2022-04-26 NOTE — PROGRESS NOTES
Pharmacokinetic Assessment Follow Up: IV Vancomycin    Patient brief summary:  Shara Hutchins is a 72 y.o. female initiated on antimicrobial therapy with IV Vancomycin for treatment of Skin & Soft Tissue infection    The patient's current regimen is intermittent dosing. Dose based on random levels      Actual Body Weight = 68.3 kg (150 lb 9.2 oz).    Renal Function:   Estimated Creatinine Clearance: 12.9 mL/min (A) (based on SCr of 3.9 mg/dL (H)).      Vancomycin serum concentration assessment(s):    The trough level was drawn correctly and can be used to guide therapy at this time. The measurement is above the desired definitive target range of 10 to 15 mcg/mL.    Vancomycin Regimen Plan:    Re-dose when the random level is less than 15 mcg/mL, next level to be drawn at AM draw on 4/28/2022    Drug levels (last 3 results):  Recent Labs   Lab Result Units 04/24/22  1546 04/26/22  0438   Vancomycin, Random ug/mL 13.2 18.4          Dialysis Method (if applicable):  intermittent HD;MWF       Pharmacy will continue to follow and monitor vancomycin.    Please contact pharmacy at extension [phone] for questions regarding this assessment.    Thank you for the consult,   Marquita Ravi

## 2022-04-26 NOTE — CONSULTS
Bilateral labia and buttock tissue is red tight and very firm.  Pubic area is also swollen.  Patient states she has used aquahor and the aquahor wash.  Samples of Interdry cloths given to add to this regime.  Patient has tried calmoseptine, zinc oxide, Reginald's paste, Triad, and more without much success.

## 2022-04-26 NOTE — PROGRESS NOTES
Consult Note  Infectious Disease    Reason for Consult:  Vulvar cellulitis    HPI: Shara Hutchins is a 72 y.o. female well known to me last seen in January    She presented on 04/23 with a history that approximately 5-6 weeks ago she felt as though she had developed a recurrence of vulvar cellulitis.  I have given her oral cefuroxime to be again in the circumstances and she did so but did not notify me and did not experience any improvement.   She also had right and does have stents, but no dysuria.  She had persistent increased pain, sloughing of some of the skin over the labia bilaterally, and so much swelling that her stools were only leaking and she has chronic diarrhea.  She gave herself Imodium to reduce the output and subsequent swelling and irritation of the perineum and inner buttocks.  She had no fever or chills at any time.  She has lymphedema of the vulva and perineum secondary to prior radiation for vulvar cancer.  White blood cells were normal on admission.  CT scan of the abdomen and pelvis showed a large volume of stool and gas in the colon, nonspecific thickening of the vulvovaginal skin suggestive of cellulitis known right-sided nephrolithiasis and a right-sided double-J stent (last changed January 18th, next follow-up July 18) and unchanged biliary ductal dilatation.  She was admitted to Hospital Medicine and placed on vancomycin and ceftriaxone.  Urine culture from admission shows a Gram-negative estelle.  She is on dialysis, makes a small amount of urine, urinalysis had greater than 100 white blood cells per high-power field but  this was not collected in a sterile manner.    4/26: interim reviewed. Afebrile. WBC normal. Wound care notes reviewed. Urine culture (contaminated) with Klebsiella ESBL. Rocephin changed to levaquin. Making lots of urine.  She does not feel any better, and she has had a little bit of bleeding from the skin erosions.  She has no specific dysuria and she is willing to try to  take a clean-catch sample.  She had some nausea this afternoon but does not know if it is due to the Levaquin she received.    EXAM & DIAGNOSTICS REVIEWED:   Vitals:     Temp:  [97.9 °F (36.6 °C)-99.5 °F (37.5 °C)]   Temp: 97.9 °F (36.6 °C) (04/26/22 1201)  Pulse: 70 (04/26/22 1201)  Resp: 18 (04/26/22 1201)  BP: (!) 145/73 (04/26/22 1201)  SpO2: 96 % (04/26/22 1201)    Intake/Output Summary (Last 24 hours) at 4/26/2022 1514  Last data filed at 4/26/2022 1300  Gross per 24 hour   Intake 1260 ml   Output 2205 ml   Net -945 ml       General:  In NAD. Alert and attentive, cooperative, uncomfortable  Eyes:  Anicteric,   EOMI  ENT:  No ulcers, exudates, thrush, nares patent, dentures  Neck:  supple,    Lungs:    Heart:     Abd:  Soft, NT, ND, normal BS, no masses or organomegaly appreciated.  :  Mons pubis, labia majora, perineum, inner buttocks are all very very edematous with lymphedema.  There small erosions on the anterior surfaces of the labia.  These are not herpetic in quality.  There are no exudates, or signs of candidiasis.  There no physical signs of a necrotizing soft tissue infection.  The anus is visible but there is tremendous swelling in this area.  there is erythema extending between the buttocks due to chronic leakage.  There is no significant difference between today and 4/25 applied antifungal/steroid cream and viscous xylocaine to the inflamed areas.  Musc:  Joints without effusion, swelling, erythema, synovitis, muscle wasting.   Skin:  No rashes.    Neuro:             Alert, attentive, speech fluent, face symmetric, moves all extremities, no focal weakness. Ambulatory  Psych:  Calm, cooperative     Extrem: No edema, erythema, phlebitis, cellulitis, warm and well perfused  VAD:   Left dialysis fistula    Isolation:    Wound:       Lines/Tubes/Drains:    General Labs reviewed:  Recent Labs   Lab 04/24/22  0536 04/25/22  0524 04/26/22  0438   WBC 4.56 5.57 4.97   HGB 11.0* 10.9* 10.6*   HCT 33.9*  33.8* 33.7*   * 112* 92*       Recent Labs   Lab 04/23/22  1055 04/24/22  0536 04/25/22  0524 04/26/22  0438    141 137 136   K 4.7 4.2 4.5 3.7    105 106 109   CO2 24 26 20* 21*   BUN 15 23 28* 20   CREATININE 4.4* 4.8* 5.2* 3.9*   CALCIUM 8.0* 8.4* 8.1* 8.1*   PROT 6.0  --   --   --    BILITOT 0.7  --   --   --    ALKPHOS 85  --   --   --    ALT 10  --   --   --    AST 19  --   --   --      No results for input(s): CRP in the last 168 hours.      Micro:  Microbiology Results (last 7 days)     Procedure Component Value Units Date/Time    Urine culture [[id number]]  (Abnormal)  (Susceptibility) Collected: 04/23/22 1727    Order Status: Completed Specimen: Urine Updated: 04/26/22 0653     Urine Culture, Routine KLEBSIELLA OXYTOCA ESBL  > 100,000 cfu/ml  Results called to and read back by Hellen Mauricio [id number]      Narrative:      Specimen Source->Urine          Imaging Reviewed:   CXR   CT abdomen and pelvis  . Large volume of stool and gas in the colon with a nonobstructive bowel gas pattern, consider correlation for constipation.  2. Nonspecific thickening of the vulvovaginal skin suggestive of cellulitis, consider correlation with physical exam. No finding of abscess or gas forming infection (necrotizing fasciitis/Iman's gangrene).  3. Circumferential bladder wall thickening, consider correlation with urinalysis for cystitis.  4. Right-sided double-J ureteral stent in expected configuration without hydronephrosis.  5. Stones in the right mid and distal ureter.  6. Numerous additional bilateral nonobstructing renal stones, and findings compatible with medullary nephrocalcinosis.  7. Unchanged mild central intrahepatic and extrahepatic biliary ductal dilation.  8. Additional and incidental findings as above, similar to the previous exam    Cardiology:    IMPRESSION & PLAN   1.  Acute edema and erythema complicating chronic lymphedema the mons pubis, vulva perineum and inner buttocks.  Am not  convinced that this reflects bacterial cellulitis   History of vulvar cancer and radiation    2.  Incontinence urine and stool complicating skin care  3.  Positive urine culture with Klebsiella ESBL, no doubt contaminated, abnormal urinalysis as well, with right-sided ureteral stent in place and known nephrolithiasis    4.  End-stage renal disease on dialysis  5.  Chronic diarrhea due to short gut syndrome      Recommendations:  Continue vancomycin and will recheck urine  levaquin for the ESBL Kleb  Contact isolation    topical steroid and anesthetic to perineal skin care  Wound care        Medical Decision Making during this encounter was  [_] Low Complexity  [_] Moderate Complexity  [xx  ] High Complexity

## 2022-04-27 PROBLEM — E53.8 VITAMIN B12 DEFICIENCY: Status: ACTIVE | Noted: 2022-01-01

## 2022-04-27 NOTE — PROGRESS NOTES
[facility] Medicine    Progress Note    Patient Name: Shara Hutchins  MRN: [id number]  Patient Class: IP- Inpatient   Admission Date: 4/23/2022  9:21 AM  Length of Stay: 2  Attending Physician: Brenda Stewart MD  Primary Care Provider: April Horton MD  Face-to-Face encounter date: 04/27/2022  Code status:  Chief Complaint: Vaginal Pain (X 3 WEEKS, STATES VAGINAL  PAIN AND SWELLING)        Subjective:    HPI:Per Sara GARCIA  Shara Hutchins is a 72 year old female  presents to  ED with vulvar cellulitis that is recurrent. Last episode 1/2022 . Required hospitalization  The patient states that about  2 weeks ago she developed pain and itching. She took keflex but symptoms have worsened   She has  hx vulvar cancer S/P  radiation and has since had recurrent episodes of cellulitis   Patient also w/DM 2 , ESRD MWF , HTN hypothyroid .  Patient states she has had increased pain., chronic diarrhea, no fever no chills no chest pain no shortness of breath.    Interval History:   4/25:Admitted for vulvar cellulitis. Reports no improvement in symptoms since admission. Pain is moderate to severe. Reports chronic diarrhea which is slightly improved now. Admits to nausea but no vomiting.     4/26:  Patient states that she is doing well however still sees blood in urine due to vulva excoriation.  Urine culture grew Klebsiella ESBL which is resistant to multiple medications but sensitive to levofloxacin, Bactrim and meropenem.  Patient is allergic to penicillins, and due to kidney function, status patient on Macrobid impaction.  Will renally dose IV levofloxacin and touch bases with ID concerning appropriate antibiotics to place patient on.  No concerns/issues overnight reported by the patient or the nursing staff.    4/27:  Patient currently on IV Levaquin and vancomycin, urine culture currently recheck.  For dialysis today.  Patient states that she has had chronic diarrhea since 2013 and there has been no change  in frequency and is still the same.  C diff negative, vitamin B12 low and would start the patient is on vitamin B12 injections.    Review of Systems All other Review of Systems were found to be negative expect for that mentioned already in HPI.     Objective:     Vitals:    04/27/22 0604 04/27/22 0810 04/27/22 1046 04/27/22 1226   BP:  133/69  (!) 161/75   BP Location:       Patient Position:       Pulse:  71  76   Resp: 18 18 18 18   Temp:  98.1 °F (36.7 °C)  97.9 °F (36.6 °C)   TempSrc:  Oral  Oral   SpO2:  97%  96%   Weight:       Height:            Vitals reviewed.  Constitutional: No distress.   HENT: NC  Head: Atraumatic.   Cardiovascular: Normal rate, regular rhythm and normal heart sounds.   Pulmonary/Chest: Effort normal. No wheezes.   Abdominal: Soft. Bowel sounds are normal. No distension and no mass. No tenderness  Neurological: Alert.   Skin: Skin is warm and dry.  vulval erythema extending from the labia to perineal region  Psych: Appropriate mood and affect    Following labs were Reviewed   CBC:  Recent Labs   Lab 04/27/22  0451   WBC 4.81   HGB 10.9*   HCT 33.8*   PLT 97*     CMP:  Recent Labs   Lab 04/27/22  0451   CALCIUM 8.5*      K 4.1   CO2 18*   *   BUN 26*   CREATININE 4.4*       Micro Results  Microbiology Results (last 7 days)     Procedure Component Value Units Date/Time    Urine culture [[id number]] Collected: 04/26/22 1706    Order Status: Completed Specimen: Urine Updated: 04/27/22 0756     Urine Culture, Routine No growth to date    Narrative:      Specimen Source->Urine    Urine culture [[id number]]  (Abnormal)  (Susceptibility) Collected: 04/23/22 1727    Order Status: Completed Specimen: Urine Updated: 04/26/22 0653     Urine Culture, Routine KLEBSIELLA OXYTOCA ESBL  > 100,000 cfu/ml  Results called to and read back by Hellen Mauricio [id number]      Narrative:      Specimen Source->Urine           Radiology Reports  CT Abdomen Pelvis With Contrast  Result Date:  4/23/2022  IMPRESSION: 1. Large volume of stool and gas in the colon with a nonobstructive bowel gas pattern, consider correlation for constipation. 2. Nonspecific thickening of the vulvovaginal skin suggestive of cellulitis, consider correlation with physical exam. No finding of abscess or gas forming infection (necrotizing fasciitis/Iman's gangrene). 3. Circumferential bladder wall thickening, consider correlation with urinalysis for cystitis. 4. Right-sided double-J ureteral stent in expected configuration without hydronephrosis. 5. Stones in the right mid and distal ureter. 6. Numerous additional bilateral nonobstructing renal stones, and findings compatible with medullary nephrocalcinosis. 7. Unchanged mild central intrahepatic and extrahepatic biliary ductal dilation. 8. Additional and incidental findings as above, similar to the previous exam. . Electronically signed by:  Rashi Wilder MD  4/23/2022 12:56 PM CDT Workstation: [id number]       Meds  Scheduled Meds:   cyanocobalamin  1,000 mcg Intramuscular Daily    diltiaZEM  120 mg Oral Daily    enoxparin  30 mg Subcutaneous Q24H    epoetin nica-ebpx (RETACRIT) injection  50 Units/kg Intravenous Every Mon, Wed, Fri    ferric citrate  2 tablet Oral TID WM    gabapentin  100 mg Oral BID    [START ON 4/28/2022] levoFLOXacin  500 mg Intravenous Q48H    levothyroxine  88 mcg Oral Before breakfast    nystatin-triamcinolone   Topical (Top) QID    rOPINIRole  0.25 mg Oral TID    sertraline  50 mg Oral QHS     Continuous Infusions:  PRN Meds:.acetaminophen, cyclobenzaprine, dextrose 50%, dextrose 50%, diphenhydrAMINE, glucagon (human recombinant), glucose, glucose, HYDROcodone-acetaminophen, LIDOcaine HCL 2%, loperamide, LORazepam, morphine, ondansetron, Pharmacy to dose Vancomycin consult **AND** vancomycin - pharmacy to dose.    Assessment & Plan:   * Vulvar cellulitis  Recurrent cellulitis vs lymphedema of vulvar area - has history of radiation for  hx of vulvar cancer   Currently on IV levofloxacin  Status post single dose of oral fluconazole for candida prophylaxis   Topical steroid and anesthetic for perineal discomfort   Infectious Disease on board       ESRD on dialysis MWF  Patient with end-stage renal disease on dialysis Monday Wednesday Friday   Nephrology following      Chronic diarrhea  Secondary to short-gut syndrome  Monitor   Use prn loperamide         Anemia of chronic disease  Likely from ESRD          Discharge Planning:   Is the patient medically ready for discharge?: no    Reason for patient still in hospital (select all that apply): Patient trending condition and Treatment    Above encounter included review of the medical records, interviewing and examining the patient face-to-face, discussion with family and other health care providers, ordering and interpreting lab/test results and formulating a plan of care.     Medical Decision Making:      [_] Low Complexity  [_] Moderate Complexity  [x] High Complexity      Brenda Stewart MD  Department of Hospital Medicine   [facility]

## 2022-04-27 NOTE — PROGRESS NOTES
Consult Note  Infectious Disease    Reason for Consult:  Vulvar cellulitis    HPI: Shara Hutchins is a 72 y.o. female well known to me last seen in January    She presented on 04/23 with a history that approximately 5-6 weeks ago she felt as though she had developed a recurrence of vulvar cellulitis.  I have given her oral cefuroxime to be again in the circumstances and she did so but did not notify me and did not experience any improvement.   She also had right and does have stents, but no dysuria.  She had persistent increased pain, sloughing of some of the skin over the labia bilaterally, and so much swelling that her stools were only leaking and she has chronic diarrhea.  She gave herself Imodium to reduce the output and subsequent swelling and irritation of the perineum and inner buttocks.  She had no fever or chills at any time.  She has lymphedema of the vulva and perineum secondary to prior radiation for vulvar cancer.  White blood cells were normal on admission.  CT scan of the abdomen and pelvis showed a large volume of stool and gas in the colon, nonspecific thickening of the vulvovaginal skin suggestive of cellulitis known right-sided nephrolithiasis and a right-sided double-J stent (last changed January 18th, next follow-up July 18) and unchanged biliary ductal dilatation.  She was admitted to Hospital Medicine and placed on vancomycin and ceftriaxone.  Urine culture from admission shows a Gram-negative estelle.  She is on dialysis, makes a small amount of urine, urinalysis had greater than 100 white blood cells per high-power field but  this was not collected in a sterile manner.    4/26: interim reviewed. Afebrile. WBC normal. Wound care notes reviewed. Urine culture (contaminated) with Klebsiella ESBL. Rocephin changed to levaquin. Making lots of urine.  She does not feel any better, and she has had a little bit of bleeding from the skin erosions.  She has no specific dysuria and she is willing to try to  take a clean-catch sample.  She had some nausea this afternoon but does not know if it is due to the Levaquin she received.  4/27: interim reviewed. Repeat UA with pyuria/hematuria and culture is negative at 1 d. Currently on dialysis. Having lots of soft tissue pain over labia and perineum. She believes she has experienced benefit of antibiotics but actually reports no improvement.     EXAM & DIAGNOSTICS REVIEWED:   Vitals:     Temp:  [97.9 °F (36.6 °C)-98.2 °F (36.8 °C)]   Temp: 97.9 °F (36.6 °C) (04/27/22 1226)  Pulse: 76 (04/27/22 1226)  Resp: 18 (04/27/22 1226)  BP: (!) 161/75 (04/27/22 1226)  SpO2: 96 % (04/27/22 1226)    Intake/Output Summary (Last 24 hours) at 4/27/2022 1431  Last data filed at 4/27/2022 1000  Gross per 24 hour   Intake 1650 ml   Output 800 ml   Net 850 ml     Exam 4/26  General:  In NAD. Alert and attentive, cooperative, uncomfortable  Eyes:  Anicteric,   EOMI  ENT:  No ulcers, exudates, thrush, nares patent, dentures  Neck:  supple,    Lungs:    Heart:     Abd:  Soft, NT, ND, normal BS, no masses or organomegaly appreciated.  :  Mons pubis, labia majora, perineum, inner buttocks are all very very edematous with lymphedema.  There small erosions on the anterior surfaces of the labia.  These are not herpetic in quality.  There are no exudates, or signs of candidiasis.  There no physical signs of a necrotizing soft tissue infection.  The anus is visible but there is tremendous swelling in this area.  there is erythema extending between the buttocks due to chronic leakage.  There is no significant difference between today and 4/25 applied antifungal/steroid cream and viscous xylocaine to the inflamed areas.  Musc:  Joints without effusion, swelling, erythema, synovitis, muscle wasting.   Skin:  No rashes.    Neuro:             Alert, attentive, speech fluent, face symmetric, moves all extremities, no focal weakness. Ambulatory  Psych:  Calm, cooperative     Extrem: No edema, erythema,  phlebitis, cellulitis, warm and well perfused  VAD:   Left dialysis fistula    Isolation:    Wound:       Lines/Tubes/Drains:    General Labs reviewed:  Recent Labs   Lab 04/25/22  0524 04/26/22  0438 04/27/22  0451   WBC 5.57 4.97 4.81   HGB 10.9* 10.6* 10.9*   HCT 33.8* 33.7* 33.8*   * 92* 97*       Recent Labs   Lab 04/23/22  1055 04/24/22  0536 04/25/22  0524 04/26/22 0438 04/27/22  0451      < > 137 136 140   K 4.7   < > 4.5 3.7 4.1      < > 106 109 113*   CO2 24   < > 20* 21* 18*   BUN 15   < > 28* 20 26*   CREATININE 4.4*   < > 5.2* 3.9* 4.4*   CALCIUM 8.0*   < > 8.1* 8.1* 8.5*   PROT 6.0  --   --   --   --    BILITOT 0.7  --   --   --   --    ALKPHOS 85  --   --   --   --    ALT 10  --   --   --   --    AST 19  --   --   --   --     < > = values in this interval not displayed.     No results for input(s): CRP in the last 168 hours.      Micro:  Microbiology Results (last 7 days)     Procedure Component Value Units Date/Time    Urine culture [[id number]] Collected: 04/26/22 1706    Order Status: Completed Specimen: Urine Updated: 04/27/22 0756     Urine Culture, Routine No growth to date    Narrative:      Specimen Source->Urine    Urine culture [[id number]]  (Abnormal)  (Susceptibility) Collected: 04/23/22 1727    Order Status: Completed Specimen: Urine Updated: 04/26/22 0653     Urine Culture, Routine KLEBSIELLA OXYTOCA ESBL  > 100,000 cfu/ml  Results called to and read back by Hellen Mauricio RN-11[facility]      Narrative:      Specimen Source->Urine          Imaging Reviewed:   CXR   CT abdomen and pelvis  . Large volume of stool and gas in the colon with a nonobstructive bowel gas pattern, consider correlation for constipation.  2. Nonspecific thickening of the vulvovaginal skin suggestive of cellulitis, consider correlation with physical exam. No finding of abscess or gas forming infection (necrotizing fasciitis/Iman's gangrene).  3. Circumferential bladder wall thickening, consider  correlation with urinalysis for cystitis.  4. Right-sided double-J ureteral stent in expected configuration without hydronephrosis.  5. Stones in the right mid and distal ureter.  6. Numerous additional bilateral nonobstructing renal stones, and findings compatible with medullary nephrocalcinosis.  7. Unchanged mild central intrahepatic and extrahepatic biliary ductal dilation.  8. Additional and incidental findings as above, similar to the previous exam    Cardiology:    IMPRESSION & PLAN   1.  Acute edema and erythema complicating chronic lymphedema the mons pubis, vulva perineum and inner buttocks.  Am not convinced that this reflects bacterial cellulitis   History of vulvar cancer and radiation    2.  Incontinence urine and stool complicating skin care  3.  Positive urine culture with Klebsiella ESBL, no doubt contaminated, abnormal urinalysis as well, with right-sided ureteral stent in place and known nephrolithiasis    4.  End-stage renal disease on dialysis  5.  Chronic diarrhea due to short gut syndrome      Recommendations:  Continue vancomycin and will recheck urine  levaquin for the ESBL Kleb  Contact isolation    topical steroid and anesthetic to perineal skin care  Wound care        Medical Decision Making during this encounter was  [_] Low Complexity  [_] Moderate Complexity  [xx  ] High Complexity

## 2022-04-27 NOTE — PLAN OF CARE
Important Message from Medicare was sign, explained and given to patient/caregiver on 04/27/2022 at 2:33pm     addressed any questions or concerns.    Important Message from Medicare document will be scanned into patient's medical record

## 2022-04-27 NOTE — PROGRESS NOTES
INPATIENT NEPHROLOGY Progress Note  [facility]    Patient Name: Shara Hutchins  Date: 04/27/2022    Reason for consultation: ESRD    Chief Complaint:   Chief Complaint   Patient presents with    Vaginal Pain     X 3 WEEKS, STATES VAGINAL  PAIN AND SWELLING     History of Present Illness:  73 y/o F with ESRD on HD MWF, short-gut syndrome with chronic diarrhea, h/o vulvar cancer s/p radiation with multiple episodes cellulitis of perineum, and pHTN. Patient experiences recurrences of vulvar cellulitis several times per year. She felt another episode coming on several weeks ago, and filled her PRN Rx Keflex by her ID provider. She has taken keflex for 3 weeks with worsening pain and infection of perineum. This region is so swollen, it causes urine and feces to go up her back when she eliminates.  Associated with nausea without vomiting.  Denies fever, chills, abdominal pain, dysuria, confusion.  She makes urine. Denies exac/reliev factors. Consulted for dialysis.    Interval History:  4/23- no issues with HD yest- comfortable on exam, vitals stable, reports taking medication to prevent diarrhea, does endorse some urinary discomfort    CT AP  1. Large volume of stool and gas in the colon with a nonobstructive bowel gas pattern, consider correlation for constipation.  2. Nonspecific thickening of the vulvovaginal skin suggestive of cellulitis, consider correlation with physical exam. No finding of abscess or gas forming infection (necrotizing fasciitis/Iman's gangrene).  3. Circumferential bladder wall thickening, consider correlation with urinalysis for cystitis.  4. Right-sided double-J ureteral stent in expected configuration without hydronephrosis.  5. Stones in the right mid and distal ureter.  6. Numerous additional bilateral nonobstructing renal stones, and findings compatible with medullary nephrocalcinosis.  7. Unchanged mild central intrahepatic and extrahepatic biliary ductal  dilation.    4/24- getting IV antbx, still with some pain  4/25 VSS, Seen on HD today. Appreciate ID input.  4/26 VSS, no new complains. HD in AM.  4/27 VSS. HD today.  4/28 VSS. HD in AM.    Plan of Care:    Vulvar cellulitis vs edema, GNR UTI  ESRD on HD MWF  HTN  Secondary HPT  Anemia of CKD    Plan:    - Dose antbx for dialysis/CrCl < 10. Appreciate ID input.  - Continue HD MWF.  - BP/VS are at goal- will UF as tolerated.  - Continue binders with meals.  - Ordered give ADAM with HD.    Thank you for allowing us to participate in this patient's care. We will continue to follow.    Vital Signs:  Temp Readings from Last 3 Encounters:   04/27/22 98.1 °F (36.7 °C) (Oral)   02/08/22 98.8 °F (37.1 °C)   01/26/22 98 °F (36.7 °C)       Pulse Readings from Last 3 Encounters:   04/27/22 71   02/08/22 74   01/26/22 77       BP Readings from Last 3 Encounters:   04/27/22 133/69   02/08/22 120/68   01/26/22 136/73       Weight:  Wt Readings from Last 3 Encounters:   04/25/22 68.3 kg (150 lb 9.2 oz)   02/08/22 64.4 kg (142 lb)   01/18/22 68.3 kg (150 lb 9.2 oz)     Medications:  Scheduled Meds:  Continuous Infusions:  PRN Meds:.  Current Facility-Administered Medications on File Prior to Encounter   Medication Dose Route Frequency Provider Last Rate Last Admin    electrolyte-S (ISOLYTE)   Intravenous Continuous Raz Ruff MD        lactated ringers infusion   Intravenous Continuous Ruy Beltre MD        LIDOcaine (PF) 10 mg/ml (1%) injection 5 mg  0.5 mL Intradermal Once Raz Ruff MD         Current Outpatient Medications on File Prior to Encounter   Medication Sig Dispense Refill    acetaminophen (TYLENOL EXTRA STRENGTH) 500 MG tablet Take 1,000 mg by mouth daily as needed for Pain.      cyclobenzaprine (FLEXERIL) 10 MG tablet TAKE ONE TABLET BY MOUTH THREE TIMES DAILY AS NEEDED FOR MUSCLE SPASMS 30 tablet 1    diltiaZEM (CARDIZEM CD) 120 MG Cp24 TAKE 1 CAPSULE DAILY 90 capsule 3    diphenhydrAMINE  (BENADRYL) 25 mg capsule Take 25 mg by mouth every 12 (twelve) hours as needed for Itching.      ferric citrate (AURYXIA) 210 mg iron Tab Take 2 tablets by mouth 3 (three) times daily with meals.      gabapentin (NEURONTIN) 100 MG capsule Take 100 mg by mouth 2 (two) times daily. HCS      hydrOXYzine HCL (ATARAX) 10 MG Tab Take 1 tablet (10 mg total) by mouth 3 (three) times daily as needed. 15 tablet 2    levothyroxine (SYNTHROID) 88 MCG tablet TAKE 1 TABLET BEFORE BREAKFAST 90 tablet 3    promethazine (PHENERGAN) 25 MG tablet TAKE 1 TABLET(25 MG) BY MOUTH TWICE DAILY AS NEEDED FOR NAUSEA (Patient taking differently: Take 25 mg by mouth 2 (two) times daily as needed for Nausea.) 60 tablet 0    rOPINIRole (REQUIP) 0.25 MG tablet Take 0.25 mg by mouth 3 (three) times daily. 1 tablet in the morning and 2 tablets at night      sertraline (ZOLOFT) 50 MG tablet Take 1 tablet (50 mg total) by mouth every evening. Take 50 mg by mouth every evening. 90 tablet 1    cefUROXime (CEFTIN) 250 MG tablet Take 250 mg by mouth 2 (two) times daily.      cholestyramine/aspartame (QUESTRAN AND AQUAPHOR TOPICAL COMPOUND) Apply to affected area twice daily 1 Container 1    doxycycline (VIBRA-TABS) 100 MG tablet Take 100 mg by mouth 2 (two) times daily.      HYDROcodone-acetaminophen (NORCO) 5-325 mg per tablet Take 1 tablet by mouth every 8 (eight) hours as needed for Pain. 221 tablet 0    miconazole (MICOTIN) 2 % cream Apply topically 2 (two) times daily. 28 g 0       Review of Systems:      Physical Exam  Constitutional:       Appearance: She is well-developed. She is not diaphoretic.   HENT:      Head: Normocephalic and atraumatic.   Eyes:      General: No scleral icterus.     Pupils: Pupils are equal, round, and reactive to light.   Cardiovascular:      Rate and Rhythm: Normal rate and regular rhythm.   Pulmonary:      Effort: Pulmonary effort is normal. No respiratory distress.      Breath sounds: No stridor.    Abdominal:      General: There is no distension.      Palpations: Abdomen is soft.   Musculoskeletal:         General: No deformity. Normal range of motion.      Cervical back: Neck supple.   Skin:     General: Skin is warm and dry.      Findings: No erythema or rash.   Neurological:      Mental Status: She is alert and oriented to person, place, and time.      Cranial Nerves: No cranial nerve deficit.   Psychiatric:         Behavior: Behavior normal.         Results:  Recent Labs   Lab 04/25/22  0524 04/26/22  0438 04/27/22  0451    136 140   K 4.5 3.7 4.1    109 113*   CO2 20* 21* 18*   BUN 28* 20 26*   CREATININE 5.2* 3.9* 4.4*   ESTGFRAFRICA 8.9* 12.5* 10.8*   EGFRNONAA 7.7* 10.9* 9.4*   GLU 84 78 76       Recent Labs   Lab 04/23/22  1055 04/24/22  0536 04/25/22  0524 04/26/22 0438 04/27/22  0451   CALCIUM 8.0*   < > 8.1* 8.1* 8.5*   ALBUMIN 3.2*  --   --   --   --    MG  --   --   --   --  1.6    < > = values in this interval not displayed.       Recent Labs   Lab 11/15/19  0748 01/22/20  0848 05/25/21  0847   Hemoglobin A1C Invalid A SEE COMMENT 4.6       Recent Labs   Lab 04/25/22  0524 04/26/22  0438 04/27/22  0451   WBC 5.57 4.97 4.81   HGB 10.9* 10.6* 10.9*   HCT 33.8* 33.7* 33.8*   * 92* 97*   * 117* 117*   MCHC 32.2 31.5* 32.2   MONO 9.5  0.5 7.6  0.4 8.3  0.4       Recent Labs   Lab 04/23/22  1055   BILITOT 0.7   PROT 6.0   ALBUMIN 3.2*   ALKPHOS 85   ALT 10   AST 19       Recent Labs   Lab 05/17/21  1819 01/18/22  0331 04/23/22  1727 04/26/22  1706   Color, UA Yellow Yellow Yellow  Yellow Yellow   Appearance, UA Hazy A Clear Cloudy A  Cloudy A Hazy A   pH, UA 8.0 7.0 8.0  8.0 7.0   Specific [place], UA 1.015 1.010 1.015  1.015 1.010   Protein, UA 2+ A Trace A 2+ A  2+ A 1+ A   Glucose, UA Negative Negative Negative  Negative Trace A   Ketones, UA Negative Negative Negative  Negative Negative   Urobilinogen, UA Negative Negative Negative  Negative Negative    Bilirubin (UA) Negative Negative Negative  Negative Negative   Occult Blood UA 2+ A 2+ A 2+ A  2+ A 3+ A   Nitrite, UA Negative Negative Positive A  Positive A Negative   RBC, UA 7 H 10 H 35 H >100 H   WBC, UA 80 H 50 H >100 H >100 H   Bacteria Few A  --  Many A Negative   Hyaline Casts, UA 0  --  23 A 8 A     I have spent > minutes providing care for this patient for the above diagnoses. These services have included chart/data/imaging review, evaluation, exam, formulation of plan, , note preparation, and discussions with staff involved in this patient's care.    Jorge Webb MD  [facility]  [address] [place] [id number]  [phone] (p)  [phone] (f)

## 2022-04-28 NOTE — PROGRESS NOTES
INPATIENT NEPHROLOGY Progress Note  [facility]    Patient Name: Shara Hutchins  Date: 04/28/2022    Reason for consultation: ESRD    Chief Complaint:   Chief Complaint   Patient presents with    Vaginal Pain     X 3 WEEKS, STATES VAGINAL  PAIN AND SWELLING     History of Present Illness:  71 y/o F with ESRD on HD MWF, short-gut syndrome with chronic diarrhea, h/o vulvar cancer s/p radiation with multiple episodes cellulitis of perineum, and pHTN. Patient experiences recurrences of vulvar cellulitis several times per year. She felt another episode coming on several weeks ago, and filled her PRN Rx Keflex by her ID provider. She has taken keflex for 3 weeks with worsening pain and infection of perineum. This region is so swollen, it causes urine and feces to go up her back when she eliminates.  Associated with nausea without vomiting.  Denies fever, chills, abdominal pain, dysuria, confusion.  She makes urine. Denies exac/reliev factors. Consulted for dialysis.    Interval History:  4/23- no issues with HD yest- comfortable on exam, vitals stable, reports taking medication to prevent diarrhea, does endorse some urinary discomfort    CT AP  1. Large volume of stool and gas in the colon with a nonobstructive bowel gas pattern, consider correlation for constipation.  2. Nonspecific thickening of the vulvovaginal skin suggestive of cellulitis, consider correlation with physical exam. No finding of abscess or gas forming infection (necrotizing fasciitis/Iman's gangrene).  3. Circumferential bladder wall thickening, consider correlation with urinalysis for cystitis.  4. Right-sided double-J ureteral stent in expected configuration without hydronephrosis.  5. Stones in the right mid and distal ureter.  6. Numerous additional bilateral nonobstructing renal stones, and findings compatible with medullary nephrocalcinosis.  7. Unchanged mild central intrahepatic and extrahepatic biliary ductal  dilation.    4/24- getting IV antbx, still with some pain  4/25 VSS, Seen on HD today. Appreciate ID input.  4/26 VSS, no new complains. HD in AM.  4/27 VSS. HD today.  4/28 VSS. HD in AM.    Plan of Care:    Vulvar cellulitis vs edema  resistant GNR UTI ? in setting of stent  ESRD on HD MWF  HTN  Secondary HPT  Anemia of CKD    Plan:    - Dose antbx for dialysis/CrCl < 10. Appreciate ID input.  - Continue HD MWF.  - BP/VS are at goal- will UF as tolerated.  - Continue binders with meals.  - Ordered give ADAM with HD.    Thank you for allowing us to participate in this patient's care. We will continue to follow.    Vital Signs:  Temp Readings from Last 3 Encounters:   04/28/22 97.9 °F (36.6 °C) (Oral)   02/08/22 98.8 °F (37.1 °C)   01/26/22 98 °F (36.7 °C)       Pulse Readings from Last 3 Encounters:   04/28/22 92   02/08/22 74   01/26/22 77       BP Readings from Last 3 Encounters:   04/28/22 (!) 172/80   02/08/22 120/68   01/26/22 136/73       Weight:  Wt Readings from Last 3 Encounters:   04/25/22 68.3 kg (150 lb 9.2 oz)   02/08/22 64.4 kg (142 lb)   01/18/22 68.3 kg (150 lb 9.2 oz)     Medications:  Scheduled Meds:  Continuous Infusions:  PRN Meds:.  Current Facility-Administered Medications on File Prior to Encounter   Medication Dose Route Frequency Provider Last Rate Last Admin    electrolyte-S (ISOLYTE)   Intravenous Continuous Raz Ruff MD        lactated ringers infusion   Intravenous Continuous Ruy Beltre MD        LIDOcaine (PF) 10 mg/ml (1%) injection 5 mg  0.5 mL Intradermal Once Raz Ruff MD         Current Outpatient Medications on File Prior to Encounter   Medication Sig Dispense Refill    acetaminophen (TYLENOL EXTRA STRENGTH) 500 MG tablet Take 1,000 mg by mouth daily as needed for Pain.      cyclobenzaprine (FLEXERIL) 10 MG tablet TAKE ONE TABLET BY MOUTH THREE TIMES DAILY AS NEEDED FOR MUSCLE SPASMS 30 tablet 1    diltiaZEM (CARDIZEM CD) 120 MG Cp24 TAKE 1 CAPSULE  DAILY 90 capsule 3    diphenhydrAMINE (BENADRYL) 25 mg capsule Take 25 mg by mouth every 12 (twelve) hours as needed for Itching.      ferric citrate (AURYXIA) 210 mg iron Tab Take 2 tablets by mouth 3 (three) times daily with meals.      gabapentin (NEURONTIN) 100 MG capsule Take 100 mg by mouth 2 (two) times daily. HCS      hydrOXYzine HCL (ATARAX) 10 MG Tab Take 1 tablet (10 mg total) by mouth 3 (three) times daily as needed. 15 tablet 2    levothyroxine (SYNTHROID) 88 MCG tablet TAKE 1 TABLET BEFORE BREAKFAST 90 tablet 3    promethazine (PHENERGAN) 25 MG tablet TAKE 1 TABLET(25 MG) BY MOUTH TWICE DAILY AS NEEDED FOR NAUSEA (Patient taking differently: Take 25 mg by mouth 2 (two) times daily as needed for Nausea.) 60 tablet 0    rOPINIRole (REQUIP) 0.25 MG tablet Take 0.25 mg by mouth 3 (three) times daily. 1 tablet in the morning and 2 tablets at night      sertraline (ZOLOFT) 50 MG tablet Take 1 tablet (50 mg total) by mouth every evening. Take 50 mg by mouth every evening. 90 tablet 1    cefUROXime (CEFTIN) 250 MG tablet Take 250 mg by mouth 2 (two) times daily.      cholestyramine/aspartame (QUESTRAN AND AQUAPHOR TOPICAL COMPOUND) Apply to affected area twice daily 1 Container 1    doxycycline (VIBRA-TABS) 100 MG tablet Take 100 mg by mouth 2 (two) times daily.      HYDROcodone-acetaminophen (NORCO) 5-325 mg per tablet Take 1 tablet by mouth every 8 (eight) hours as needed for Pain. 221 tablet 0    miconazole (MICOTIN) 2 % cream Apply topically 2 (two) times daily. 28 g 0     Review of Systems:    Physical Exam  Constitutional:       Appearance: She is well-developed. She is not diaphoretic.   HENT:      Head: Normocephalic and atraumatic.   Eyes:      General: No scleral icterus.     Pupils: Pupils are equal, round, and reactive to light.   Cardiovascular:      Rate and Rhythm: Normal rate and regular rhythm.   Pulmonary:      Effort: Pulmonary effort is normal. No respiratory distress.       Breath sounds: No stridor.   Abdominal:      General: There is no distension.      Palpations: Abdomen is soft.   Musculoskeletal:         General: No deformity. Normal range of motion.      Cervical back: Neck supple.   Skin:     General: Skin is warm and dry.      Findings: No erythema or rash.   Neurological:      Mental Status: She is alert and oriented to person, place, and time.      Cranial Nerves: No cranial nerve deficit.   Psychiatric:         Behavior: Behavior normal.         Results:  Recent Labs   Lab 04/26/22  0438 04/27/22  0451 04/28/22  0507    140 143   K 3.7 4.1 3.9    113* 106   CO2 21* 18* 26   BUN 20 26* 14   CREATININE 3.9* 4.4* 3.1*   ESTGFRAFRICA 12.5* 10.8* 16.6*   EGFRNONAA 10.9* 9.4* 14.4*   GLU 78 76 85       Recent Labs   Lab 04/23/22  1055 04/24/22  0536 04/26/22  0438 04/27/22  0451 04/28/22  0507   CALCIUM 8.0*   < > 8.1* 8.5* 8.5*   ALBUMIN 3.2*  --   --   --   --    MG  --   --   --  1.6 1.6    < > = values in this interval not displayed.       Recent Labs   Lab 11/15/19  0748 01/22/20  0848 05/25/21  0847   Hemoglobin A1C Invalid A SEE COMMENT 4.6       Recent Labs   Lab 04/26/22  0438 04/27/22  0451 04/28/22  0507   WBC 4.97 4.81 5.38   HGB 10.6* 10.9* 10.8*   HCT 33.7* 33.8* 33.6*   PLT 92* 97* 115*   * 117* 116*   MCHC 31.5* 32.2 32.1   MONO 7.6  0.4 8.3  0.4 6.1  0.3       Recent Labs   Lab 04/23/22  1055   BILITOT 0.7   PROT 6.0   ALBUMIN 3.2*   ALKPHOS 85   ALT 10   AST 19       Recent Labs   Lab 05/17/21  1819 01/18/22  0331 04/23/22  1727 04/26/22  1706   Color, UA Yellow Yellow Yellow  Yellow Yellow   Appearance, UA Hazy A Clear Cloudy A  Cloudy A Hazy A   pH, UA 8.0 7.0 8.0  8.0 7.0   Specific [place], UA 1.015 1.010 1.015  1.015 1.010   Protein, UA 2+ A Trace A 2+ A  2+ A 1+ A   Glucose, UA Negative Negative Negative  Negative Trace A   Ketones, UA Negative Negative Negative  Negative Negative   Urobilinogen, UA Negative Negative Negative   Negative Negative   Bilirubin (UA) Negative Negative Negative  Negative Negative   Occult Blood UA 2+ A 2+ A 2+ A  2+ A 3+ A   Nitrite, UA Negative Negative Positive A  Positive A Negative   RBC, UA 7 H 10 H 35 H >100 H   WBC, UA 80 H 50 H >100 H >100 H   Bacteria Few A  --  Many A Negative   Hyaline Casts, UA 0  --  23 A 8 A     I have spent > minutes providing care for this patient for the above diagnoses. These services have included chart/data/imaging review, evaluation, exam, formulation of plan, , note preparation, and discussions with staff involved in this patient's care.    Jorge Webb MD  [facility] [address] [address]  [phone] (p)  [phone] (f)

## 2022-04-28 NOTE — PROGRESS NOTES
Consult Note  Infectious Disease    Reason for Consult:  Vulvar cellulitis    HPI: Shara Hutchins is a 72 y.o. female well known to me last seen in January    She presented on 04/23 with a history that approximately 5-6 weeks ago she felt as though she had developed a recurrence of vulvar cellulitis.  I have given her oral cefuroxime to be again in the circumstances and she did so but did not notify me and did not experience any improvement.   She also had right and does have stents, but no dysuria.  She had persistent increased pain, sloughing of some of the skin over the labia bilaterally, and so much swelling that her stools were only leaking and she has chronic diarrhea.  She gave herself Imodium to reduce the output and subsequent swelling and irritation of the perineum and inner buttocks.  She had no fever or chills at any time.  She has lymphedema of the vulva and perineum secondary to prior radiation for vulvar cancer.  White blood cells were normal on admission.  CT scan of the abdomen and pelvis showed a large volume of stool and gas in the colon, nonspecific thickening of the vulvovaginal skin suggestive of cellulitis known right-sided nephrolithiasis and a right-sided double-J stent (last changed January 18th, next follow-up July 18) and unchanged biliary ductal dilatation.  She was admitted to Hospital Medicine and placed on vancomycin and ceftriaxone.  Urine culture from admission shows a Gram-negative estelle.  She is on dialysis, makes a small amount of urine, urinalysis had greater than 100 white blood cells per high-power field but  this was not collected in a sterile manner.    4/26: interim reviewed. Afebrile. WBC normal. Wound care notes reviewed. Urine culture (contaminated) with Klebsiella ESBL. Rocephin changed to levaquin. Making lots of urine.  She does not feel any better, and she has had a little bit of bleeding from the skin erosions.  She has no specific dysuria and she is willing to try to  take a clean-catch sample.  She had some nausea this afternoon but does not know if it is due to the Levaquin she received.  4/27: interim reviewed. Repeat UA with pyuria/hematuria and culture is negative at 1 d. Currently on dialysis. Having lots of soft tissue pain over labia and perineum. She believes she has experienced benefit of antibiotics but actually reports no improvement.   4/28: She reports that she slept very well after ativan, but now her right flank hurts similar to kidney stone pain. The urine culture was contaminated despite her best efforts at clean/catch. The swelling and discomfort of vulva are improved. She still has some erosions of anterior labia.    EXAM & DIAGNOSTICS REVIEWED:   Vitals:     Temp:  [97.9 °F (36.6 °C)-98.7 °F (37.1 °C)]   Temp: 97.9 °F (36.6 °C) (04/28/22 0755)  Pulse: 92 (04/28/22 0755)  Resp: 18 (04/28/22 0755)  BP: (!) 172/80 (04/28/22 0755)  SpO2: 96 % (04/28/22 0755)    Intake/Output Summary (Last 24 hours) at 4/28/2022 0845  Last data filed at 4/28/2022 0810  Gross per 24 hour   Intake 1450 ml   Output 1100 ml   Net 350 ml        General:  In NAD. Alert and attentive, cooperative, uncomfortable  Eyes:  Anicteric,   EOMI  ENT:  No ulcers, exudates, thrush, nares patent, dentures  Neck:  supple,    Lungs:  mild bibasilar crackles  Heart:   reg with 2/6 systolic murmur  Abd:  Soft, NT, ND, normal BS, no masses or organomegaly appreciated.  :  Mons pubis, labia majora, perineum, inner buttocks are all defintely less edematous  , though still violaceous and congested.  There small erosions on the anterior surfaces of the labia.  These are not herpetic in quality.  There are no exudates, or signs of candidiasis.  There are no physical signs of a necrotizing soft tissue infection.  T   there is erythema extending between the buttocks due to chronic leakage.     She has sensitivity to both flanks with minimal pressure but deep pressure right flank does not elicit more guarding  or complaints  Musc:  Joints without effusion, swelling, erythema, synovitis, muscle wasting.   Skin:  No rashes.    Neuro:             Alert, attentive, speech fluent, face symmetric, moves all extremities, no focal weakness. Ambulatory  Psych:  Calm, cooperative     Extrem: No edema, erythema, phlebitis, cellulitis, warm and well perfused  VAD:   Left dialysis fistula    Isolation:    Wound:       Lines/Tubes/Drains:    General Labs reviewed:  Recent Labs   Lab 04/26/22 0438 04/27/22  0451 04/28/22  0507   WBC 4.97 4.81 5.38   HGB 10.6* 10.9* 10.8*   HCT 33.7* 33.8* 33.6*   PLT 92* 97* 115*       Recent Labs   Lab 04/23/22  1055 04/24/22  0536 04/26/22  0438 04/27/22  0451 04/28/22  0507      < > 136 140 143   K 4.7   < > 3.7 4.1 3.9      < > 109 113* 106   CO2 24   < > 21* 18* 26   BUN 15   < > 20 26* 14   CREATININE 4.4*   < > 3.9* 4.4* 3.1*   CALCIUM 8.0*   < > 8.1* 8.5* 8.5*   PROT 6.0  --   --   --   --    BILITOT 0.7  --   --   --   --    ALKPHOS 85  --   --   --   --    ALT 10  --   --   --   --    AST 19  --   --   --   --     < > = values in this interval not displayed.     No results for input(s): CRP in the last 168 hours.      Micro:  Microbiology Results (last 7 days)     Procedure Component Value Units Date/Time    Urine culture [[id number]] Collected: 04/26/22 1706    Order Status: Completed Specimen: Urine Updated: 04/28/22 0825     Urine Culture, Routine Multiple organisms isolated. None in predominance.  Repeat if      clinically necessary.    Narrative:      Specimen Source->Urine    Urine culture [[id number]]  (Abnormal)  (Susceptibility) Collected: 04/23/22 1727    Order Status: Completed Specimen: Urine Updated: 04/26/22 0653     Urine Culture, Routine KLEBSIELLA OXYTOCA ESBL  > 100,000 cfu/ml  Results called to and read back by Hellen Mauricio [id number]      Narrative:      Specimen Source->Urine          Imaging Reviewed:   CXR   CT abdomen and pelvis  . Large volume of stool and  gas in the colon with a nonobstructive bowel gas pattern, consider correlation for constipation.  2. Nonspecific thickening of the vulvovaginal skin suggestive of cellulitis, consider correlation with physical exam. No finding of abscess or gas forming infection (necrotizing fasciitis/Iman's gangrene).  3. Circumferential bladder wall thickening, consider correlation with urinalysis for cystitis.  4. Right-sided double-J ureteral stent in expected configuration without hydronephrosis.  5. Stones in the right mid and distal ureter.  6. Numerous additional bilateral nonobstructing renal stones, and findings compatible with medullary nephrocalcinosis.  7. Unchanged mild central intrahepatic and extrahepatic biliary ductal dilation.  8. Additional and incidental findings as above, similar to the previous exam    Cardiology:    IMPRESSION & PLAN   1.  Acute edema and erythema complicating chronic lymphedema the mons pubis, vulva perineum and inner buttocks.  Am not convinced that this reflects bacterial cellulitis   History of vulvar cancer and radiation    2.  Incontinence urine and stool complicating skin care  3.  Positive urine culture with Klebsiella ESBL, no doubt contaminated, abnormal urinalysis as well, with right-sided ureteral stent in place and known nephrolithiasis     Repeat urine culture contaminated, still with pyuria    4.  End-stage renal disease on dialysis  5.  Chronic diarrhea due to short gut syndrome      Recommendations:  Continue vancomycin while here for skin  Continue levaquin, po (only because she has stent in situ)  Renal US has been ordered  Contact isolation    topical steroid and anesthetic to perineal skin care  Wound care        Medical Decision Making during this encounter was  [_] Low Complexity  [_] Moderate Complexity  [xx  ] High Complexity

## 2022-04-28 NOTE — PROGRESS NOTES
[facility] Medicine    Progress Note    Patient Name: Shara Hutchins  MRN: [id number]  Patient Class: IP- Inpatient   Admission Date: 4/23/2022  9:21 AM  Length of Stay: 3  Attending Physician: Brenda Stewart MD  Primary Care Provider: April Horton MD  Face-to-Face encounter date: 04/28/2022  Code status:  Chief Complaint: Vaginal Pain (X 3 WEEKS, STATES VAGINAL  PAIN AND SWELLING)        Subjective:    HPI:Per Sara GARCIA  Shara Hutchins is a 72 year old female  presents to  ED with vulvar cellulitis that is recurrent. Last episode 1/2022 . Required hospitalization  The patient states that about  2 weeks ago she developed pain and itching. She took keflex but symptoms have worsened   She has  hx vulvar cancer S/P  radiation and has since had recurrent episodes of cellulitis   Patient also w/DM 2 , ESRD MWF , HTN hypothyroid .  Patient states she has had increased pain., chronic diarrhea, no fever no chills no chest pain no shortness of breath.    Interval History:   4/25:Admitted for vulvar cellulitis. Reports no improvement in symptoms since admission. Pain is moderate to severe. Reports chronic diarrhea which is slightly improved now. Admits to nausea but no vomiting.     4/26:  Patient states that she is doing well however still sees blood in urine due to vulva excoriation.  Urine culture grew Klebsiella ESBL which is resistant to multiple medications but sensitive to levofloxacin, Bactrim and meropenem.  Patient is allergic to penicillins, and due to kidney function, status patient on Macrobid impaction.  Will renally dose IV levofloxacin and touch bases with ID concerning appropriate antibiotics to place patient on.  No concerns/issues overnight reported by the patient or the nursing staff.    4/27:  Patient currently on IV Levaquin and vancomycin, urine culture currently recheck.  For dialysis today.  Patient states that she has had chronic diarrhea since 2013 and there has been no change  in frequency and is still the same.  C diff negative, vitamin B12 low and would start the patient is on vitamin B12 injections.    4/28:  Patient states that she thinks she has a kidney stone and she is having flank pain, would order renal CT.  No acute overnight changes    Review of Systems All other Review of Systems were found to be negative expect for that mentioned already in HPI.     Objective:     Vitals:    04/28/22 0755 04/28/22 0847 04/28/22 1156 04/28/22 1340   BP: (!) 172/80  (!) 151/76    BP Location:       Patient Position:       Pulse: 92  77    Resp: 18 18 18 18   Temp: 97.9 °F (36.6 °C)  97.9 °F (36.6 °C)    TempSrc: Oral  Oral    SpO2: 96%  97%    Weight:       Height:            Vitals reviewed.  Constitutional: No distress.   HENT: NC  Head: Atraumatic.   Cardiovascular: Normal rate, regular rhythm and normal heart sounds.   Pulmonary/Chest: Effort normal. No wheezes.   Abdominal: Soft. Bowel sounds are normal. No distension and no mass. No tenderness  Neurological: Alert.   Skin: Skin is warm and dry.  vulval erythema extending from the labia to perineal region  Psych: Appropriate mood and affect    Following labs were Reviewed   CBC:  Recent Labs   Lab 04/28/22  0507   WBC 5.38   HGB 10.8*   HCT 33.6*   *     CMP:  Recent Labs   Lab 04/28/22  0507   CALCIUM 8.5*      K 3.9   CO2 26      BUN 14   CREATININE 3.1*       Micro Results  Microbiology Results (last 7 days)     Procedure Component Value Units Date/Time    Urine culture [[id number]] Collected: 04/26/22 1706    Order Status: Completed Specimen: Urine Updated: 04/28/22 0825     Urine Culture, Routine Multiple organisms isolated. None in predominance.  Repeat if      clinically necessary.    Narrative:      Specimen Source->Urine    Urine culture [[id number]]  (Abnormal)  (Susceptibility) Collected: 04/23/22 1727    Order Status: Completed Specimen: Urine Updated: 04/26/22 0653     Urine Culture, Routine KLEBSIELLA OXYTOCA  ESBL  > 100,000 cfu/ml  Results called to and read back by Hellen Mauricio [id number]      Narrative:      Specimen Source->Urine           Radiology Reports  CT Abdomen Pelvis With Contrast  Result Date: 4/23/2022  IMPRESSION: 1. Large volume of stool and gas in the colon with a nonobstructive bowel gas pattern, consider correlation for constipation. 2. Nonspecific thickening of the vulvovaginal skin suggestive of cellulitis, consider correlation with physical exam. No finding of abscess or gas forming infection (necrotizing fasciitis/Iman's gangrene). 3. Circumferential bladder wall thickening, consider correlation with urinalysis for cystitis. 4. Right-sided double-J ureteral stent in expected configuration without hydronephrosis. 5. Stones in the right mid and distal ureter. 6. Numerous additional bilateral nonobstructing renal stones, and findings compatible with medullary nephrocalcinosis. 7. Unchanged mild central intrahepatic and extrahepatic biliary ductal dilation. 8. Additional and incidental findings as above, similar to the previous exam. . Electronically signed by:  Rashi Wilder MD  4/23/2022 12:56 PM CDT Workstation: [id number]       Meds  Scheduled Meds:   cyanocobalamin  1,000 mcg Intramuscular Daily    diltiaZEM  120 mg Oral Daily    enoxparin  30 mg Subcutaneous Q24H    epoetin nica-ebpx (RETACRIT) injection  50 Units/kg Intravenous Every Mon, Wed, Fri    ferric citrate  2 tablet Oral TID WM    gabapentin  100 mg Oral BID    levoFLOXacin  250 mg Oral Daily    levothyroxine  88 mcg Oral Before breakfast    nystatin-triamcinolone   Topical (Top) QID    rOPINIRole  0.25 mg Oral TID    sertraline  50 mg Oral QHS     Continuous Infusions:  PRN Meds:.acetaminophen, cyclobenzaprine, dextrose 50%, dextrose 50%, diphenhydrAMINE, glucagon (human recombinant), glucose, glucose, HYDROcodone-acetaminophen, LIDOcaine HCL 2%, loperamide, LORazepam, morphine, ondansetron, Pharmacy to dose  Vancomycin consult **AND** vancomycin - pharmacy to dose.    Assessment & Plan:   * Vulvar cellulitis  Recurrent cellulitis vs lymphedema of vulvar area - has history of radiation for hx of vulvar cancer   Currently on IV levofloxacin  Status post single dose of oral fluconazole for candida prophylaxis   Topical steroid and anesthetic for perineal discomfort   Infectious Disease on board       ESRD on dialysis MWF  Patient with end-stage renal disease on dialysis Monday Wednesday Friday   Nephrology following      Chronic diarrhea  Secondary to short-gut syndrome  Monitor   Use prn loperamide         Anemia of chronic disease  Likely from ESRD          Discharge Planning:   Is the patient medically ready for discharge?: no    Reason for patient still in hospital (select all that apply): Patient trending condition and Treatment    Above encounter included review of the medical records, interviewing and examining the patient face-to-face, discussion with family and other health care providers, ordering and interpreting lab/test results and formulating a plan of care.     Medical Decision Making:      [_] Low Complexity  [_] Moderate Complexity  [x] High Complexity      Brenda Stewart MD  Department of Hospital Medicine   Critical access hospital

## 2022-04-28 NOTE — PROGRESS NOTES
Pharmacokinetic Assessment Follow Up: IV Vancomycin    Patient brief summary:  Shara Hutchins is a 72 y.o. female initiated on antimicrobial therapy with IV Vancomycin for treatment of Skin & Soft Tissue infection    The patient's current regimen is intermittent dosing based on random levels and dialysis scheduled      Actual Body Weight = 68.3 kg (150 lb 9.2 oz).    Renal Function:   Estimated Creatinine Clearance: 16.3 mL/min (A) (based on SCr of 3.1 mg/dL (H)).      Vancomycin serum concentration assessment(s):    The random level was drawn correctly and can be used to guide therapy at this time. The measurement is within the desired definitive target range of 10 to 15 mcg/mL.    Vancomycin Regimen Plan:    Re-dose 4/28 x 1 dose. Next random level to be drawn at AM labs on 4/30    Drug levels (last 3 results):  Recent Labs   Lab Result Units 04/26/22  0438 04/28/22  0507   Vancomycin, Random ug/mL 18.4 12.4          Dialysis Method (if applicable):  intermittent HD; MWF      Pharmacy will continue to follow and monitor vancomycin.    Please contact pharmacy at extension [phone] for questions regarding this assessment.    Thank you for the consult,   Raúl Hill

## 2022-04-29 NOTE — PLAN OF CARE
Problem: Adult Inpatient Plan of Care  Goal: Plan of Care Review  Outcome: Ongoing, Progressing  Goal: Patient-Specific Goal (Individualized)  Outcome: Ongoing, Progressing  Goal: Absence of Hospital-Acquired Illness or Injury  Outcome: Ongoing, Progressing  Goal: Optimal Comfort and Wellbeing  Outcome: Ongoing, Progressing  Goal: Readiness for Transition of Care  Outcome: Ongoing, Progressing     Problem: Diabetes Comorbidity  Goal: Blood Glucose Level Within Targeted Range  Outcome: Ongoing, Progressing     Problem: Adjustment to Illness (Sepsis/Septic Shock)  Goal: Optimal Coping  Outcome: Ongoing, Progressing     Problem: Bleeding (Sepsis/Septic Shock)  Goal: Absence of Bleeding  Outcome: Ongoing, Progressing     Problem: Glycemic Control Impaired (Sepsis/Septic Shock)  Goal: Blood Glucose Level Within Desired Range  Outcome: Ongoing, Progressing     Problem: Infection Progression (Sepsis/Septic Shock)  Goal: Absence of Infection Signs and Symptoms  Outcome: Ongoing, Progressing     Problem: Nutrition Impaired (Sepsis/Septic Shock)  Goal: Optimal Nutrition Intake  Outcome: Ongoing, Progressing     Problem: Fluid and Electrolyte Imbalance (Acute Kidney Injury/Impairment)  Goal: Fluid and Electrolyte Balance  Outcome: Ongoing, Progressing     Problem: Oral Intake Inadequate (Acute Kidney Injury/Impairment)  Goal: Optimal Nutrition Intake  Outcome: Ongoing, Progressing     Problem: Renal Function Impairment (Acute Kidney Injury/Impairment)  Goal: Effective Renal Function  Outcome: Ongoing, Progressing     Problem: Fluid Imbalance (Pneumonia)  Goal: Fluid Balance  Outcome: Ongoing, Progressing     Problem: Infection (Pneumonia)  Goal: Resolution of Infection Signs and Symptoms  Outcome: Ongoing, Progressing     Problem: Respiratory Compromise (Pneumonia)  Goal: Effective Oxygenation and Ventilation  Outcome: Ongoing, Progressing     Problem: Infection  Goal: Absence of Infection Signs and Symptoms  Outcome:  Ongoing, Progressing     Problem: Impaired Wound Healing  Goal: Optimal Wound Healing  Outcome: Ongoing, Progressing     Problem: Device-Related Complication Risk (Hemodialysis)  Goal: Safe, Effective Therapy Delivery  Outcome: Ongoing, Progressing     Problem: Hemodynamic Instability (Hemodialysis)  Goal: Effective Tissue Perfusion  Outcome: Ongoing, Progressing     Problem: Infection (Hemodialysis)  Goal: Absence of Infection Signs and Symptoms  Outcome: Ongoing, Progressing

## 2022-04-29 NOTE — PROGRESS NOTES
[facility]    Progress Note    Patient Name: Shara Hutchins  MRN: [id number]  Patient Class: IP- Inpatient   Admission Date: 4/23/2022  9:21 AM  Length of Stay: 4  Attending Physician: Brenda Stewart MD  Primary Care Provider: April Horton MD  Face-to-Face encounter date: 04/29/2022  Code status:  Chief Complaint: Vaginal Pain (X 3 WEEKS, STATES VAGINAL  PAIN AND SWELLING)        Subjective:    HPI:Per Sara GARCIA  Shara Hutchins is a 72 year old female  presents to  ED with vulvar cellulitis that is recurrent. Last episode 1/2022 . Required hospitalization  The patient states that about  2 weeks ago she developed pain and itching. She took keflex but symptoms have worsened   She has  hx vulvar cancer S/P  radiation and has since had recurrent episodes of cellulitis   Patient also w/DM 2 , ESRD MWF , HTN hypothyroid .  Patient states she has had increased pain., chronic diarrhea, no fever no chills no chest pain no shortness of breath.    Interval History:   4/25:Admitted for vulvar cellulitis. Reports no improvement in symptoms since admission. Pain is moderate to severe. Reports chronic diarrhea which is slightly improved now. Admits to nausea but no vomiting.     4/26:  Patient states that she is doing well however still sees blood in urine due to vulva excoriation.  Urine culture grew Klebsiella ESBL which is resistant to multiple medications but sensitive to levofloxacin, Bactrim and meropenem.  Patient is allergic to penicillins, and due to kidney function, status patient on Macrobid impaction.  Will renally dose IV levofloxacin and touch bases with ID concerning appropriate antibiotics to place patient on.  No concerns/issues overnight reported by the patient or the nursing staff.    4/27:  Patient currently on IV Levaquin and vancomycin, urine culture currently recheck.  For dialysis today.  Patient states that she has had chronic diarrhea since 2013 and there has been no change  in frequency and is still the same.  C diff negative, vitamin B12 low and would start the patient is on vitamin B12 injections.    4/28:  Patient states that she thinks she has a kidney stone and she is having flank pain, would order renal CT.  No acute overnight changes    4/29:  Patient doing well and currently receiving dialysis.  CT shows stable right ureteral stent however moderate volume of stool and gas in colon.  Will give patient 1 dose lactulose and continue to monitor.    Review of Systems All other Review of Systems were found to be negative expect for that mentioned already in HPI.     Objective:     Vitals:    04/29/22 1030 04/29/22 1100 04/29/22 1130 04/29/22 1200   BP: (!) 150/71 (!) 143/77 (!) 145/77 (!) 155/84   BP Location:       Patient Position:       Pulse: 74 73 75 77   Resp:       Temp:   97.7 °F (36.5 °C)    TempSrc:       SpO2:   95%    Weight:       Height:            Vitals reviewed.  Constitutional: No distress.   HENT: NC  Head: Atraumatic.   Cardiovascular: Normal rate, regular rhythm and normal heart sounds.   Pulmonary/Chest: Effort normal. No wheezes.   Abdominal: Soft. Bowel sounds are normal. No distension and no mass. No tenderness  Neurological: Alert.   Skin: Skin is warm and dry.  vulval erythema extending from the labia to perineal region  Psych: Appropriate mood and affect    Following labs were Reviewed   CBC:  Recent Labs   Lab 04/29/22  0455   WBC 7.29   HGB 11.4*   HCT 36.7*        CMP:  Recent Labs   Lab 04/29/22  0455   CALCIUM 8.9      K 3.8   CO2 24      BUN 22   CREATININE 4.1*       Micro Results  Microbiology Results (last 7 days)     Procedure Component Value Units Date/Time    Urine culture [[id number]] Collected: 04/26/22 1706    Order Status: Completed Specimen: Urine Updated: 04/28/22 0825     Urine Culture, Routine Multiple organisms isolated. None in predominance.  Repeat if      clinically necessary.    Narrative:      Specimen  Source->Urine    Urine culture [[id number]]  (Abnormal)  (Susceptibility) Collected: 04/23/22 1727    Order Status: Completed Specimen: Urine Updated: 04/26/22 0653     Urine Culture, Routine KLEBSIELLA OXYTOCA ESBL  > 100,000 cfu/ml  Results called to and read back by Hellen Mauricio [id number]      Narrative:      Specimen Source->Urine           Radiology Reports  CT Abdomen Pelvis With Contrast  Result Date: 4/23/2022  IMPRESSION: 1. Large volume of stool and gas in the colon with a nonobstructive bowel gas pattern, consider correlation for constipation. 2. Nonspecific thickening of the vulvovaginal skin suggestive of cellulitis, consider correlation with physical exam. No finding of abscess or gas forming infection (necrotizing fasciitis/Iman's gangrene). 3. Circumferential bladder wall thickening, consider correlation with urinalysis for cystitis. 4. Right-sided double-J ureteral stent in expected configuration without hydronephrosis. 5. Stones in the right mid and distal ureter. 6. Numerous additional bilateral nonobstructing renal stones, and findings compatible with medullary nephrocalcinosis. 7. Unchanged mild central intrahepatic and extrahepatic biliary ductal dilation. 8. Additional and incidental findings as above, similar to the previous exam. . Electronically signed by:  Rashi Wilder MD  4/23/2022 12:56 PM CDT Workstation: [id number]       Meds  Scheduled Meds:   cyanocobalamin  1,000 mcg Intramuscular Daily    diltiaZEM  120 mg Oral Daily    enoxparin  30 mg Subcutaneous Q24H    epoetin nica-ebpx (RETACRIT) injection  50 Units/kg Intravenous Every Mon, Wed, Fri    ferric citrate  2 tablet Oral TID WM    gabapentin  100 mg Oral BID    lactulose  20 g Oral Once    levoFLOXacin  250 mg Oral Daily    levothyroxine  88 mcg Oral Before breakfast    nystatin-triamcinolone   Topical (Top) QID    rOPINIRole  0.25 mg Oral TID    sertraline  50 mg Oral QHS     Continuous Infusions:  PRN  Meds:.acetaminophen, cyclobenzaprine, dextrose 50%, dextrose 50%, diphenhydrAMINE, glucagon (human recombinant), glucose, glucose, hydrALAZINE, HYDROcodone-acetaminophen, LIDOcaine HCL 2%, loperamide, LORazepam, morphine, ondansetron, Pharmacy to dose Vancomycin consult **AND** vancomycin - pharmacy to dose.    Assessment & Plan:   * Vulvar cellulitis  Recurrent cellulitis vs lymphedema of vulvar area - has history of radiation for hx of vulvar cancer   Currently on oral levofloxacin  Status post single dose of oral fluconazole for candida prophylaxis   Topical steroid and anesthetic for perineal discomfort   Infectious Disease on board       ESRD on dialysis MWF  Patient with end-stage renal disease on dialysis Monday Wednesday Friday   Nephrology following      Chronic diarrhea  Secondary to short-gut syndrome  Monitor   Use prn loperamide         Anemia of chronic disease  Likely from ESRD          Discharge Planning:   Is the patient medically ready for discharge?: no    Reason for patient still in hospital (select all that apply): Patient trending condition and Treatment    Above encounter included review of the medical records, interviewing and examining the patient face-to-face, discussion with family and other health care providers, ordering and interpreting lab/test results and formulating a plan of care.     Medical Decision Making:      [_] Low Complexity  [_] Moderate Complexity  [x] High Complexity      Brenda Stewart MD  Department of Hospital Medicine   Formerly [facility]

## 2022-04-29 NOTE — PROGRESS NOTES
Consult Note  Infectious Disease    Reason for Consult:  Vulvar cellulitis    HPI: Shara Hutchins is a 72 y.o. female well known to me last seen in January    She presented on 04/23 with a history that approximately 5-6 weeks ago she felt as though she had developed a recurrence of vulvar cellulitis.  I have given her oral cefuroxime to be again in the circumstances and she did so but did not notify me and did not experience any improvement.   She also had right and does have stents, but no dysuria.  She had persistent increased pain, sloughing of some of the skin over the labia bilaterally, and so much swelling that her stools were only leaking and she has chronic diarrhea.  She gave herself Imodium to reduce the output and subsequent swelling and irritation of the perineum and inner buttocks.  She had no fever or chills at any time.  She has lymphedema of the vulva and perineum secondary to prior radiation for vulvar cancer.  White blood cells were normal on admission.  CT scan of the abdomen and pelvis showed a large volume of stool and gas in the colon, nonspecific thickening of the vulvovaginal skin suggestive of cellulitis known right-sided nephrolithiasis and a right-sided double-J stent (last changed January 18th, next follow-up July 18) and unchanged biliary ductal dilatation.  She was admitted to Hospital Medicine and placed on vancomycin and ceftriaxone.  Urine culture from admission shows a Gram-negative estelle.  She is on dialysis, makes a small amount of urine, urinalysis had greater than 100 white blood cells per high-power field but  this was not collected in a sterile manner.    4/26: interim reviewed. Afebrile. WBC normal. Wound care notes reviewed. Urine culture (contaminated) with Klebsiella ESBL. Rocephin changed to levaquin. Making lots of urine.  She does not feel any better, and she has had a little bit of bleeding from the skin erosions.  She has no specific dysuria and she is willing to try to  take a clean-catch sample.  She had some nausea this afternoon but does not know if it is due to the Levaquin she received.  4/27: interim reviewed. Repeat UA with pyuria/hematuria and culture is negative at 1 d. Currently on dialysis. Having lots of soft tissue pain over labia and perineum. She believes she has experienced benefit of antibiotics but actually reports no improvement.   4/28: She reports that she slept very well after ativan, but now her right flank hurts similar to kidney stone pain. The urine culture was contaminated despite her best efforts at clean/catch. The swelling and discomfort of vulva are improved. She still has some erosions of anterior labia.  4/29:  Interim reviewed.  Afebrile.  CT scan shows lots of stool similar to admission scan.  The stent and stones stable.  She feels better.  She believes that she can go home this weekend but does not want to discharge today if she is receiving a cathartic today.    EXAM & DIAGNOSTICS REVIEWED:   Vitals:     Temp:  [97.7 °F (36.5 °C)-98.4 °F (36.9 °C)]   Temp: 97.9 °F (36.6 °C) (04/29/22 0813)  Pulse: 95 (04/29/22 0813)  Resp: 18 (04/29/22 0813)  BP: (!) 169/78 (04/29/22 0813)  SpO2: 98 % (04/29/22 0813)    Intake/Output Summary (Last 24 hours) at 4/29/2022 1016  Last data filed at 4/29/2022 0816  Gross per 24 hour   Intake 730 ml   Output 700 ml   Net 30 ml      Exam below 4/28.  On dialysis, wounds not disturbed 4/29  General:  In NAD. Alert and attentive, cooperative, uncomfortable  Eyes:  Anicteric,   EOMI  ENT:  No ulcers, exudates, thrush, nares patent, dentures  Neck:  supple,    Lungs:  mild bibasilar crackles  Heart:   reg with 2/6 systolic murmur  Abd:  Soft, NT, ND, normal BS, no masses or organomegaly appreciated.  :  Mons pubis, labia majora, perineum, inner buttocks are all defintely less edematous  , though still violaceous and congested.  There small erosions on the anterior surfaces of the labia.  These are not herpetic in  quality.  There are no exudates, or signs of candidiasis.  There are no physical signs of a necrotizing soft tissue infection.  T   there is erythema extending between the buttocks due to chronic leakage.     She has sensitivity to both flanks with minimal pressure but deep pressure right flank does not elicit more guarding or complaints  Musc:  Joints without effusion, swelling, erythema, synovitis, muscle wasting.   Skin:  No rashes.    Neuro:             Alert, attentive, speech fluent, face symmetric, moves all extremities, no focal weakness. Ambulatory  Psych:  Calm, cooperative     Extrem: No edema, erythema, phlebitis, cellulitis, warm and well perfused  VAD:   Left dialysis fistula    Isolation:    Wound:       Lines/Tubes/Drains:    General Labs reviewed:  Recent Labs   Lab 04/27/22  0451 04/28/22  0507 04/29/22  0455   WBC 4.81 5.38 7.29   HGB 10.9* 10.8* 11.4*   HCT 33.8* 33.6* 36.7*   PLT 97* 115* 155       Recent Labs   Lab 04/23/22  1055 04/24/22  0536 04/27/22  0451 04/28/22  0507 04/29/22  0455      < > 140 143 139   K 4.7   < > 4.1 3.9 3.8      < > 113* 106 103   CO2 24   < > 18* 26 24   BUN 15   < > 26* 14 22   CREATININE 4.4*   < > 4.4* 3.1* 4.1*   CALCIUM 8.0*   < > 8.5* 8.5* 8.9   PROT 6.0  --   --   --   --    BILITOT 0.7  --   --   --   --    ALKPHOS 85  --   --   --   --    ALT 10  --   --   --   --    AST 19  --   --   --   --     < > = values in this interval not displayed.     No results for input(s): CRP in the last 168 hours.      Micro:  Microbiology Results (last 7 days)     Procedure Component Value Units Date/Time    Urine culture [[id number]] Collected: 04/26/22 1706    Order Status: Completed Specimen: Urine Updated: 04/28/22 0825     Urine Culture, Routine Multiple organisms isolated. None in predominance.  Repeat if      clinically necessary.    Narrative:      Specimen Source->Urine    Urine culture [[id number]]  (Abnormal)  (Susceptibility) Collected: 04/23/22 9507     Order Status: Completed Specimen: Urine Updated: 04/26/22 0653     Urine Culture, Routine KLEBSIELLA OXYTOCA ESBL  > 100,000 cfu/ml  Results called to and read back by Hell[facility]icio [facility]      Narrative:      Specimen Source->Urine          Imaging Reviewed:   CXR   CT abdomen and pelvis  . Large volume of stool and gas in the colon with a nonobstructive bowel gas pattern, consider correlation for constipation.  2. Nonspecific thickening of the vulvovaginal skin suggestive of cellulitis, consider correlation with physical exam. No finding of abscess or gas forming infection (necrotizing fasciitis/Iman's gangrene).  3. Circumferential bladder wall thickening, consider correlation with urinalysis for cystitis.  4. Right-sided double-J ureteral stent in expected configuration without hydronephrosis.  5. Stones in the right mid and distal ureter.  6. Numerous additional bilateral nonobstructing renal stones, and findings compatible with medullary nephrocalcinosis.  7. Unchanged mild central intrahepatic and extrahepatic biliary ductal dilation.  8. Additional and incidental findings as above, similar to the previous exam    Cardiology:    IMPRESSION & PLAN   1.  Acute edema and erythema complicating chronic lymphedema the mons pubis, vulva perineum and inner buttocks.  Am not convinced that this reflects bacterial cellulitis   History of vulvar cancer and radiation    2.  Incontinence urine and stool complicating skin care  3.  Positive urine culture with Klebsiella ESBL, no doubt contaminated, abnormal urinalysis as well, with right-sided ureteral stent in place and known nephrolithiasis     Repeat urine culture contaminated, still with pyuria    4.  End-stage renal disease on dialysis  5.  Chronic diarrhea due to short gut syndrome with considerable stool noted by CT on admission 4/28      Recommendations:  Continue vancomycin while here for skin  Continue levaquin, po (only because she has stent in situ) for  another 4-5 days     Contact isolation    topical steroid and anesthetic to perineal skin care  Wound care    catharsis  Discussed with Hospital Medicine  Medical Decision Making during this encounter was  [_] Low Complexity  [_] Moderate Complexity  [xx  ] High Complexity

## 2022-04-29 NOTE — PLAN OF CARE
Late entry for 4/28- Patient still requires continued medical care. Discharge plan to return to [facility] and resume dialysis at discharge.       04/28/22 1030   Discharge Reassessment   Assessment Type Discharge Planning Reassessment   Did the patient's condition or plan change since previous assessment? No   Discharge Plan discussed with: Patient   Discharge Plan A Assisted Living   Discharge Plan B Assisted Living   DME Needed Upon Discharge  none   Why the patient remains in the hospital Requires continued medical care

## 2022-04-29 NOTE — PROGRESS NOTES
INPATIENT NEPHROLOGY Progress Note  [facility]    Patient Name: Shara Hutchins  Date: 04/29/2022    Reason for consultation: ESRD    Chief Complaint:   Chief Complaint   Patient presents with    Vaginal Pain     X 3 WEEKS, STATES VAGINAL  PAIN AND SWELLING     History of Present Illness:  73 y/o F with ESRD on HD MWF, short-gut syndrome with chronic diarrhea, h/o vulvar cancer s/p radiation with multiple episodes cellulitis of perineum, and pHTN. Patient experiences recurrences of vulvar cellulitis several times per year. She felt another episode coming on several weeks ago, and filled her PRN Rx Keflex by her ID provider. She has taken keflex for 3 weeks with worsening pain and infection of perineum. This region is so swollen, it causes urine and feces to go up her back when she eliminates.  Associated with nausea without vomiting.  Denies fever, chills, abdominal pain, dysuria, confusion.  She makes urine. Denies exac/reliev factors. Consulted for dialysis.    Interval History:  4/23- no issues with HD yest- comfortable on exam, vitals stable, reports taking medication to prevent diarrhea, does endorse some urinary discomfort    CT AP  1. Large volume of stool and gas in the colon with a nonobstructive bowel gas pattern, consider correlation for constipation.  2. Nonspecific thickening of the vulvovaginal skin suggestive of cellulitis, consider correlation with physical exam. No finding of abscess or gas forming infection (necrotizing fasciitis/Iman's gangrene).  3. Circumferential bladder wall thickening, consider correlation with urinalysis for cystitis.  4. Right-sided double-J ureteral stent in expected configuration without hydronephrosis.  5. Stones in the right mid and distal ureter.  6. Numerous additional bilateral nonobstructing renal stones, and findings compatible with medullary nephrocalcinosis.  7. Unchanged mild central intrahepatic and extrahepatic biliary ductal  dilation.    4/24- getting IV antbx, still with some pain  4/25 VSS, Seen on HD today. Appreciate ID input.  4/26 VSS, no new complains. HD in AM.  4/27 VSS. HD today.  4/28 VSS. HD in AM.  4/29 VSS, no new complains. HD today. D/c when ready.    Plan of Care:    Vulvar cellulitis vs edema  resistant GNR UTI ? in setting of stent  ESRD on HD MWF  HTN  Secondary HPT  Anemia of CKD    Plan:    - Dose antbx for dialysis/CrCl < 10. Appreciate ID input.  - Continue HD MWF.  - BP/VS are at goal- will UF as tolerated.  - Continue binders with meals.  - Ordered give ADAM with HD.    Thank you for allowing us to participate in this patient's care. We will continue to follow.    Vital Signs:  Temp Readings from Last 3 Encounters:   04/29/22 97 °F (36.1 °C)   02/08/22 98.8 °F (37.1 °C)   01/26/22 98 °F (36.7 °C)       Pulse Readings from Last 3 Encounters:   04/29/22 74   02/08/22 74   01/26/22 77       BP Readings from Last 3 Encounters:   04/29/22 (!) 150/71   02/08/22 120/68   01/26/22 136/73       Weight:  Wt Readings from Last 3 Encounters:   04/25/22 68.3 kg (150 lb 9.2 oz)   02/08/22 64.4 kg (142 lb)   01/18/22 68.3 kg (150 lb 9.2 oz)     Medications:  Scheduled Meds:  Continuous Infusions:  PRN Meds:.  Current Facility-Administered Medications on File Prior to Encounter   Medication Dose Route Frequency Provider Last Rate Last Admin    electrolyte-S (ISOLYTE)   Intravenous Continuous Raz Ruff MD        lactated ringers infusion   Intravenous Continuous Ruy Beltre MD        LIDOcaine (PF) 10 mg/ml (1%) injection 5 mg  0.5 mL Intradermal Once Raz Ruff MD         Current Outpatient Medications on File Prior to Encounter   Medication Sig Dispense Refill    acetaminophen (TYLENOL EXTRA STRENGTH) 500 MG tablet Take 1,000 mg by mouth daily as needed for Pain.      cyclobenzaprine (FLEXERIL) 10 MG tablet TAKE ONE TABLET BY MOUTH THREE TIMES DAILY AS NEEDED FOR MUSCLE SPASMS 30 tablet 1     diltiaZEM (CARDIZEM CD) 120 MG Cp24 TAKE 1 CAPSULE DAILY 90 capsule 3    diphenhydrAMINE (BENADRYL) 25 mg capsule Take 25 mg by mouth every 12 (twelve) hours as needed for Itching.      ferric citrate (AURYXIA) 210 mg iron Tab Take 2 tablets by mouth 3 (three) times daily with meals.      gabapentin (NEURONTIN) 100 MG capsule Take 100 mg by mouth 2 (two) times daily. HCS      hydrOXYzine HCL (ATARAX) 10 MG Tab Take 1 tablet (10 mg total) by mouth 3 (three) times daily as needed. 15 tablet 2    levothyroxine (SYNTHROID) 88 MCG tablet TAKE 1 TABLET BEFORE BREAKFAST 90 tablet 3    promethazine (PHENERGAN) 25 MG tablet TAKE 1 TABLET(25 MG) BY MOUTH TWICE DAILY AS NEEDED FOR NAUSEA (Patient taking differently: Take 25 mg by mouth 2 (two) times daily as needed for Nausea.) 60 tablet 0    rOPINIRole (REQUIP) 0.25 MG tablet Take 0.25 mg by mouth 3 (three) times daily. 1 tablet in the morning and 2 tablets at night      sertraline (ZOLOFT) 50 MG tablet Take 1 tablet (50 mg total) by mouth every evening. Take 50 mg by mouth every evening. 90 tablet 1    cefUROXime (CEFTIN) 250 MG tablet Take 250 mg by mouth 2 (two) times daily.      cholestyramine/aspartame (QUESTRAN AND AQUAPHOR TOPICAL COMPOUND) Apply to affected area twice daily 1 Container 1    doxycycline (VIBRA-TABS) 100 MG tablet Take 100 mg by mouth 2 (two) times daily.      HYDROcodone-acetaminophen (NORCO) 5-325 mg per tablet Take 1 tablet by mouth every 8 (eight) hours as needed for Pain. 221 tablet 0    miconazole (MICOTIN) 2 % cream Apply topically 2 (two) times daily. 28 g 0     Review of Systems:    Physical Exam  Constitutional:       Appearance: She is well-developed. She is not diaphoretic.   HENT:      Head: Normocephalic and atraumatic.   Eyes:      General: No scleral icterus.     Pupils: Pupils are equal, round, and reactive to light.   Cardiovascular:      Rate and Rhythm: Normal rate and regular rhythm.   Pulmonary:      Effort: Pulmonary  effort is normal. No respiratory distress.      Breath sounds: No stridor.   Abdominal:      General: There is no distension.      Palpations: Abdomen is soft.   Musculoskeletal:         General: No deformity. Normal range of motion.      Cervical back: Neck supple.   Skin:     General: Skin is warm and dry.      Findings: No erythema or rash.   Neurological:      Mental Status: She is alert and oriented to person, place, and time.      Cranial Nerves: No cranial nerve deficit.   Psychiatric:         Behavior: Behavior normal.         Results:  Recent Labs   Lab 04/27/22  0451 04/28/22  0507 04/29/22  0455    143 139   K 4.1 3.9 3.8   * 106 103   CO2 18* 26 24   BUN 26* 14 22   CREATININE 4.4* 3.1* 4.1*   ESTGFRAFRICA 10.8* 16.6* 11.8*   EGFRNONAA 9.4* 14.4* 10.2*   GLU 76 85 91       Recent Labs   Lab 04/23/22  1055 04/24/22  0536 04/27/22  0451 04/28/22  0507 04/29/22  0455   CALCIUM 8.0*   < > 8.5* 8.5* 8.9   ALBUMIN 3.2*  --   --   --   --    MG  --   --  1.6 1.6 1.6    < > = values in this interval not displayed.       Recent Labs   Lab 11/15/19  0748 01/22/20  0848 05/25/21  0847   Hemoglobin A1C Invalid A SEE COMMENT 4.6       Recent Labs   Lab 04/27/22  0451 04/28/22  0507 04/29/22  0455   WBC 4.81 5.38 7.29   HGB 10.9* 10.8* 11.4*   HCT 33.8* 33.6* 36.7*   PLT 97* 115* 155   * 116* 120*   MCHC 32.2 32.1 31.1*   MONO 8.3  0.4 6.1  0.3 7.3  0.5       Recent Labs   Lab 04/23/22  1055   BILITOT 0.7   PROT 6.0   ALBUMIN 3.2*   ALKPHOS 85   ALT 10   AST 19       Recent Labs   Lab 05/17/21  1819 01/18/22  0331 04/23/22  1727 04/26/22  1706   Color, UA Yellow Yellow Yellow  Yellow Yellow   Appearance, UA Hazy A Clear Cloudy A  Cloudy A Hazy A   pH, UA 8.0 7.0 8.0  8.0 7.0   Specific [place], UA 1.015 1.010 1.015  1.015 1.010   Protein, UA 2+ A Trace A 2+ A  2+ A 1+ A   Glucose, UA Negative Negative Negative  Negative Trace A   Ketones, UA Negative Negative Negative  Negative Negative    Urobilinogen, UA Negative Negative Negative  Negative Negative   Bilirubin (UA) Negative Negative Negative  Negative Negative   Occult Blood UA 2+ A 2+ A 2+ A  2+ A 3+ A   Nitrite, UA Negative Negative Positive A  Positive A Negative   RBC, UA 7 H 10 H 35 H >100 H   WBC, UA 80 H 50 H >100 H >100 H   Bacteria Few A  --  Many A Negative   Hyaline Casts, UA 0  --  23 A 8 A     I have spent > minutes providing care for this patient for the above diagnoses. These services have included chart/data/imaging review, evaluation, exam, formulation of plan, , note preparation, and discussions with staff involved in this patient's care.    Jorge Webb MD  [facility] [address] [address]  [phone] (p)  [phone] (f)

## 2022-04-30 NOTE — PLAN OF CARE
04/30/22 0830   Medicare Message   Important Message from Medicare regarding Discharge Appeal Rights Signed/date by patient/caregiver;Explained to patient/caregiver   Date IMM was signed 04/30/22   Time IMM was signed 0830

## 2022-04-30 NOTE — PLAN OF CARE
Problem: Adult Inpatient Plan of Care  Goal: Plan of Care Review  Outcome: Met  Goal: Patient-Specific Goal (Individualized)  Outcome: Met  Goal: Absence of Hospital-Acquired Illness or Injury  Outcome: Met  Goal: Optimal Comfort and Wellbeing  Outcome: Met  Goal: Readiness for Transition of Care  Outcome: Met     Problem: Diabetes Comorbidity  Goal: Blood Glucose Level Within Targeted Range  Outcome: Met     Problem: Adjustment to Illness (Sepsis/Septic Shock)  Goal: Optimal Coping  Outcome: Met     Problem: Bleeding (Sepsis/Septic Shock)  Goal: Absence of Bleeding  Outcome: Met     Problem: Glycemic Control Impaired (Sepsis/Septic Shock)  Goal: Blood Glucose Level Within Desired Range  Outcome: Met     Problem: Infection Progression (Sepsis/Septic Shock)  Goal: Absence of Infection Signs and Symptoms  Outcome: Met     Problem: Nutrition Impaired (Sepsis/Septic Shock)  Goal: Optimal Nutrition Intake  Outcome: Met     Problem: Fluid and Electrolyte Imbalance (Acute Kidney Injury/Impairment)  Goal: Fluid and Electrolyte Balance  Outcome: Met     Problem: Oral Intake Inadequate (Acute Kidney Injury/Impairment)  Goal: Optimal Nutrition Intake  Outcome: Met     Problem: Renal Function Impairment (Acute Kidney Injury/Impairment)  Goal: Effective Renal Function  Outcome: Met     Problem: Fluid Imbalance (Pneumonia)  Goal: Fluid Balance  Outcome: Met     Problem: Infection (Pneumonia)  Goal: Resolution of Infection Signs and Symptoms  Outcome: Met     Problem: Respiratory Compromise (Pneumonia)  Goal: Effective Oxygenation and Ventilation  Outcome: Met     Problem: Infection  Goal: Absence of Infection Signs and Symptoms  Outcome: Met     Problem: Impaired Wound Healing  Goal: Optimal Wound Healing  Outcome: Met     Problem: Device-Related Complication Risk (Hemodialysis)  Goal: Safe, Effective Therapy Delivery  Outcome: Met     Problem: Hemodynamic Instability (Hemodialysis)  Goal: Effective Tissue Perfusion  Outcome:  Met     Problem: Infection (Hemodialysis)  Goal: Absence of Infection Signs and Symptoms  Outcome: Met

## 2022-04-30 NOTE — PLAN OF CARE
04/30/22 0830   HAND Message   Medicare Outpatient and Observation Notification regarding financial responsibility Explained to patient/caregiver;Signed/date by patient/caregiver   Date HAND was signed 04/30/22   Time HAND was signed 0830

## 2022-04-30 NOTE — PROGRESS NOTES
Pharmacokinetic Assessment Follow Up: IV Vancomycin    Vancomycin serum concentration assessment(s):    The random level was drawn correctly and can be used to guide therapy at this time. The measurement is above the desired definitive target range of 10 to 15 mcg/mL.    Vancomycin Regimen Plan:    Re-dose when the random level is less than 15 mcg/mL, next level to be drawn at 0430 on 05/02    Drug levels (last 3 results):  Recent Labs   Lab Result Units 04/28/22  0507 04/30/22  0438   Vancomycin, Random ug/mL 12.4 17.8       Pharmacy will continue to follow and monitor vancomycin.    Please contact pharmacy at extension [phone] for questions regarding this assessment.    Thank you for the consult,   Otoniel Khan       Patient brief summary:  Shara Hutchins is a 72 y.o. female initiated on antimicrobial therapy with IV Vancomycin for treatment of skin & soft tissue infection    Drug Allergies:   Review of patient's allergies indicates:   Allergen Reactions    Ciprofloxacin Other (See Comments)     Elevated liver enzymes      Pcn [penicillins] Anaphylaxis     TOLERATES ROCEPHIN WITHOUT DIFFICULTY       Actual Body Weight:   68.3 kg    Renal Function:   Estimated Creatinine Clearance: 12.3 mL/min (A) (based on SCr of 4.1 mg/dL (H)).,     CBC (last 72 hours):  Recent Labs   Lab Result Units 04/28/22  0507 04/29/22  0455   WBC K/uL 5.38 7.29   Hemoglobin g/dL 10.8* 11.4*   Hematocrit % 33.6* 36.7*   Platelets K/uL 115* 155   Gran % % 62.0 53.9   Lymph % % 27.9 34.7   Mono % % 6.1 7.3   Eosinophil % % 3.0 3.3   Basophil % % 0.4 0.3   Differential Method  Automated Automated       Metabolic Panel (last 72 hours):  Recent Labs   Lab Result Units 04/28/22  0507 04/29/22  0455   Sodium mmol/L 143 139   Potassium mmol/L 3.9 3.8   Chloride mmol/L 106 103   CO2 mmol/L 26 24   Glucose mg/dL 85 91   BUN mg/dL 14 22   Creatinine mg/dL 3.1* 4.1*   Magnesium mg/dL 1.6 1.6       Vancomycin Administrations:  vancomycin given in the last 96  hours                     vancomycin in dextrose 5 % 1 gram/250 mL IVPB 1,000 mg (mg) 1,000 mg New Bag 04/28/22 1126                    Microbiologic Results:  Microbiology Results (last 7 days)       Procedure Component Value Units Date/Time    Urine culture [[id number]] Collected: 04/26/22 1706    Order Status: Completed Specimen: Urine Updated: 04/28/22 0825     Urine Culture, Routine Multiple organisms isolated. None in predominance.  Repeat if      clinically necessary.    Narrative:      Specimen Source->Urine    Urine culture [[id number]]  (Abnormal)  (Susceptibility) Collected: 04/23/22 1727    Order Status: Completed Specimen: Urine Updated: 04/26/22 0653     Urine Culture, Routine KLEBSIELLA OXYTOCA ESBL  > 100,000 cfu/ml  Results called to and read back by Hellen Mauricio [id number]      Narrative:      Specimen Source->Urine

## 2022-04-30 NOTE — PROGRESS NOTES
INPATIENT NEPHROLOGY Progress Note  [facility]    Patient Name: Shara Hutchins  Date: 04/30/2022    Reason for consultation: ESRD    Chief Complaint:   Chief Complaint   Patient presents with    Vaginal Pain     X 3 WEEKS, STATES VAGINAL  PAIN AND SWELLING     History of Present Illness:  73 y/o F with ESRD on HD MWF, short-gut syndrome with chronic diarrhea, h/o vulvar cancer s/p radiation with multiple episodes cellulitis of perineum, and pHTN. Patient experiences recurrences of vulvar cellulitis several times per year. She felt another episode coming on several weeks ago, and filled her PRN Rx Keflex by her ID provider. She has taken keflex for 3 weeks with worsening pain and infection of perineum. This region is so swollen, it causes urine and feces to go up her back when she eliminates.  Associated with nausea without vomiting.  Denies fever, chills, abdominal pain, dysuria, confusion.  She makes urine. Denies exac/reliev factors. Consulted for dialysis.    Interval History:  4/23- no issues with HD yest- comfortable on exam, vitals stable, reports taking medication to prevent diarrhea, does endorse some urinary discomfort    CT AP  1. Large volume of stool and gas in the colon with a nonobstructive bowel gas pattern, consider correlation for constipation.  2. Nonspecific thickening of the vulvovaginal skin suggestive of cellulitis, consider correlation with physical exam. No finding of abscess or gas forming infection (necrotizing fasciitis/Iman's gangrene).  3. Circumferential bladder wall thickening, consider correlation with urinalysis for cystitis.  4. Right-sided double-J ureteral stent in expected configuration without hydronephrosis.  5. Stones in the right mid and distal ureter.  6. Numerous additional bilateral nonobstructing renal stones, and findings compatible with medullary nephrocalcinosis.  7. Unchanged mild central intrahepatic and extrahepatic biliary ductal  dilation.    4/24- getting IV antbx, still with some pain  4/25 VSS, Seen on HD today. Appreciate ID input.  4/26 VSS, no new complains. HD in AM.  4/27 VSS. HD today.  4/28 VSS. HD in AM.  4/29 VSS, no new complains. HD today. D/c when ready.  4/30 VSS, no new complains. HD on Mon or PRN.    Plan of Care:    Vulvar cellulitis vs edema  resistant GNR UTI ? in setting of stent  ESRD on HD MWF  HTN  Secondary HPT  Anemia of CKD    Plan:    - Dose antbx for dialysis/CrCl < 10. Appreciate ID input.  - Continue HD MWF.  - BP/VS are at goal- will UF as tolerated.  - Continue binders with meals.  - Ordered give ADAM with HD.    Thank you for allowing us to participate in this patient's care. We will continue to follow.    Vital Signs:  Temp Readings from Last 3 Encounters:   04/30/22 97.5 °F (36.4 °C) (Oral)   02/08/22 98.8 °F (37.1 °C)   01/26/22 98 °F (36.7 °C)       Pulse Readings from Last 3 Encounters:   04/30/22 77   02/08/22 74   01/26/22 77       BP Readings from Last 3 Encounters:   04/30/22 (!) 184/90   02/08/22 120/68   01/26/22 136/73       Weight:  Wt Readings from Last 3 Encounters:   04/25/22 68.3 kg (150 lb 9.2 oz)   02/08/22 64.4 kg (142 lb)   01/18/22 68.3 kg (150 lb 9.2 oz)     Medications:  Scheduled Meds:  Continuous Infusions:  PRN Meds:.  Current Facility-Administered Medications on File Prior to Encounter   Medication Dose Route Frequency Provider Last Rate Last Admin    electrolyte-S (ISOLYTE)   Intravenous Continuous Raz Ruff MD        lactated ringers infusion   Intravenous Continuous Ruy Beltre MD        LIDOcaine (PF) 10 mg/ml (1%) injection 5 mg  0.5 mL Intradermal Once Raz Ruff MD         Current Outpatient Medications on File Prior to Encounter   Medication Sig Dispense Refill    acetaminophen (TYLENOL EXTRA STRENGTH) 500 MG tablet Take 1,000 mg by mouth daily as needed for Pain.      cyclobenzaprine (FLEXERIL) 10 MG tablet TAKE ONE TABLET BY MOUTH THREE TIMES  DAILY AS NEEDED FOR MUSCLE SPASMS 30 tablet 1    diltiaZEM (CARDIZEM CD) 120 MG Cp24 TAKE 1 CAPSULE DAILY 90 capsule 3    diphenhydrAMINE (BENADRYL) 25 mg capsule Take 25 mg by mouth every 12 (twelve) hours as needed for Itching.      ferric citrate (AURYXIA) 210 mg iron Tab Take 2 tablets by mouth 3 (three) times daily with meals.      gabapentin (NEURONTIN) 100 MG capsule Take 100 mg by mouth 2 (two) times daily. HCS      hydrOXYzine HCL (ATARAX) 10 MG Tab Take 1 tablet (10 mg total) by mouth 3 (three) times daily as needed. 15 tablet 2    levothyroxine (SYNTHROID) 88 MCG tablet TAKE 1 TABLET BEFORE BREAKFAST 90 tablet 3    promethazine (PHENERGAN) 25 MG tablet TAKE 1 TABLET(25 MG) BY MOUTH TWICE DAILY AS NEEDED FOR NAUSEA (Patient taking differently: Take 25 mg by mouth 2 (two) times daily as needed for Nausea.) 60 tablet 0    rOPINIRole (REQUIP) 0.25 MG tablet Take 0.25 mg by mouth 3 (three) times daily. 1 tablet in the morning and 2 tablets at night      sertraline (ZOLOFT) 50 MG tablet Take 1 tablet (50 mg total) by mouth every evening. Take 50 mg by mouth every evening. 90 tablet 1    cefUROXime (CEFTIN) 250 MG tablet Take 250 mg by mouth 2 (two) times daily.      cholestyramine/aspartame (QUESTRAN AND AQUAPHOR TOPICAL COMPOUND) Apply to affected area twice daily 1 Container 1    doxycycline (VIBRA-TABS) 100 MG tablet Take 100 mg by mouth 2 (two) times daily.      HYDROcodone-acetaminophen (NORCO) 5-325 mg per tablet Take 1 tablet by mouth every 8 (eight) hours as needed for Pain. 221 tablet 0    miconazole (MICOTIN) 2 % cream Apply topically 2 (two) times daily. 28 g 0     Review of Systems:    Physical Exam  Constitutional:       Appearance: She is well-developed. She is not diaphoretic.   HENT:      Head: Normocephalic and atraumatic.   Eyes:      General: No scleral icterus.     Pupils: Pupils are equal, round, and reactive to light.   Cardiovascular:      Rate and Rhythm: Normal rate and  regular rhythm.   Pulmonary:      Effort: Pulmonary effort is normal. No respiratory distress.      Breath sounds: No stridor.   Abdominal:      General: There is no distension.      Palpations: Abdomen is soft.   Musculoskeletal:         General: No deformity. Normal range of motion.      Cervical back: Neck supple.   Skin:     General: Skin is warm and dry.      Findings: No erythema or rash.   Neurological:      Mental Status: She is alert and oriented to person, place, and time.      Cranial Nerves: No cranial nerve deficit.   Psychiatric:         Behavior: Behavior normal.         Results:  Recent Labs   Lab 04/27/22  0451 04/28/22  0507 04/29/22  0455    143 139   K 4.1 3.9 3.8   * 106 103   CO2 18* 26 24   BUN 26* 14 22   CREATININE 4.4* 3.1* 4.1*   ESTGFRAFRICA 10.8* 16.6* 11.8*   EGFRNONAA 9.4* 14.4* 10.2*   GLU 76 85 91       Recent Labs   Lab 04/23/22  1055 04/24/22  0536 04/27/22  0451 04/28/22  0507 04/29/22  0455   CALCIUM 8.0*   < > 8.5* 8.5* 8.9   ALBUMIN 3.2*  --   --   --   --    MG  --   --  1.6 1.6 1.6    < > = values in this interval not displayed.       Recent Labs   Lab 11/15/19  0748 01/22/20  0848 05/25/21  0847   Hemoglobin A1C Invalid A SEE COMMENT 4.6       Recent Labs   Lab 04/27/22  0451 04/28/22  0507 04/29/22  0455   WBC 4.81 5.38 7.29   HGB 10.9* 10.8* 11.4*   HCT 33.8* 33.6* 36.7*   PLT 97* 115* 155   * 116* 120*   MCHC 32.2 32.1 31.1*   MONO 8.3  0.4 6.1  0.3 7.3  0.5       Recent Labs   Lab 04/23/22  1055   BILITOT 0.7   PROT 6.0   ALBUMIN 3.2*   ALKPHOS 85   ALT 10   AST 19       Recent Labs   Lab 05/17/21  1819 01/18/22  0331 04/23/22  1727 04/26/22  1706   Color, UA Yellow Yellow Yellow  Yellow Yellow   Appearance, UA Hazy A Clear Cloudy A  Cloudy A Hazy A   pH, UA 8.0 7.0 8.0  8.0 7.0   Specific [place], UA 1.015 1.010 1.015  1.015 1.010   Protein, UA 2+ A Trace A 2+ A  2+ A 1+ A   Glucose, UA Negative Negative Negative  Negative Trace A   Ketones,  UA Negative Negative Negative  Negative Negative   Urobilinogen, UA Negative Negative Negative  Negative Negative   Bilirubin (UA) Negative Negative Negative  Negative Negative   Occult Blood UA 2+ A 2+ A 2+ A  2+ A 3+ A   Nitrite, UA Negative Negative Positive A  Positive A Negative   RBC, UA 7 H 10 H 35 H >100 H   WBC, UA 80 H 50 H >100 H >100 H   Bacteria Few A  --  Many A Negative   Hyaline Casts, UA 0  --  23 A 8 A     I have spent > minutes providing care for this patient for the above diagnoses. These services have included chart/data/imaging review, evaluation, exam, formulation of plan, , note preparation, and discussions with staff involved in this patient's care.    Jorge Webb MD  [facility] [place]  [address] [place] [id number]  [phone] (p)  [phone] (f)

## 2022-04-30 NOTE — PLAN OF CARE
Patient cleared for discharge from case management standpoint.       04/30/22 1056   Final Note   Assessment Type Final Discharge Note   Anticipated Discharge Disposition Home   Hospital Resources/Appts/Education Provided Appointments scheduled and added to AVS;Provided patient/caregiver with written discharge plan information;Provided education on problems/symptoms using teachback

## 2022-04-30 NOTE — DISCHARGE SUMMARY
[facility] Medicine  Discharge Summary      Patient Name: Shara Hutchins  MRN: [id number]  Patient Class: IP- Inpatient  Admission Date: 4/23/2022  Hospital Length of Stay: 5 days  Discharge Date and Time:  04/30/2022 10:42 AM  Attending Physician: Brenda Stewart MD   Discharging Provider: Brenda Stewart MD  Primary Care Provider: April Horton MD      HPI:   Shara Hutchins is a 72 year old female  presents to  ED with vulvar cellulitis that is recurrent. Last episode 1/2022 . Required hospitalization  The patient states that about  2 weeks ago she developed pain and itching. She took keflex but symptoms have worsened   She has  hx vulvar cancer S/P  radiation and has since had recurrent episodes of cellulitis   Patient also w/DM 2 , ESRD MWF , HTN hypothyroid .  Patient states she has had increased pain., chronic diarrhea, no fever no chills no chest pain no shortness of breath.      * No surgery found *      Hospital Course:   72-year-old female with history of vulvar cellulitis in the setting of prior irradiation for vulvar cancer in 2019 and prior admissions for similar symptoms admitted for recurrent vulvar cellulitis.  Initiated on vancomycin and ceftriaxone.  Being followed by infectious disease.  Nephrology following for ESRD on HD.    4/25:Admitted for vulvar cellulitis. Reports no improvement in symptoms since admission. Pain is moderate to severe. Reports chronic diarrhea which is slightly improved now. Admits to nausea but no vomiting.      4/26:  Patient states that she is doing well however still sees blood in urine due to vulva excoriation.  Urine culture grew Klebsiella ESBL which is resistant to multiple medications but sensitive to levofloxacin, Bactrim and meropenem.  Patient is allergic to penicillins, and due to kidney function, status patient on Macrobid impaction.  Will renally dose IV levofloxacin and touch bases with ID concerning appropriate antibiotics to place  "patient on.  No concerns/issues overnight reported by the patient or the nursing staff.     4/27:  Patient currently on IV Levaquin and vancomycin, urine culture currently recheck.  For dialysis today.  Patient states that she has had chronic diarrhea since 2013 and there has been no change in frequency and is still the same.  C diff negative, vitamin B12 low and would start the patient is on vitamin B12 injections.     4/28:  Patient states that she thinks she has a kidney stone and she is having flank pain, would order renal CT.  No acute overnight changes     4/29:  Patient doing well and currently receiving dialysis.  CT shows stable right ureteral stent however moderate volume of stool and gas in colon.  Will give patient 1 dose lactulose and continue to monitor.     4/30 Patients blood pressure medication was adjusted. Patient refused home health and was subsequently discharged home to folow up with PCP and infectious dx    Physical examination on DC  Constitutional: No distress.   HENT: NC  Head: Atraumatic.   Cardiovascular: Normal rate, regular rhythm and normal heart sounds.   Pulmonary/Chest: Effort normal. No wheezes.   Abdominal: Soft. Bowel sounds are normal. No distension and no mass. No tenderness  Neurological: Alert.   Skin: Skin is warm and dry.  vulval erythema extending from the labia to perineal region  Psych: Appropriate mood and affect    Goals of Care Treatment Preferences:  Code Status: Full Code      Consults:   Consults (From admission, onward)        Status Ordering Provider     Inpatient consult to Infectious Diseases  Once        Provider:  Alla Neal MD    Completed MIKE FLOWER     Pharmacy to dose Vancomycin consult  Once        Provider:  (Not yet assigned)   "And" Linked Group Details    Acknowledged SHAWN VILLATORO     Inpatient consult to Nephrology  Once        Provider:  Yasmin Parker MD    Completed NAVA LUA     Inpatient consult to Hospitalist  Once "        Provider:  Shawn Ruiz, SHAWN Hadley          No new Assessment & Plan notes have been filed under this hospital service since the last note was generated.  Service: Hospital Medicine    Final Active Diagnoses:    Diagnosis Date Noted POA    PRINCIPAL PROBLEM:  Vulvar cellulitis [N76.2]  Yes    Vitamin B12 deficiency [E53.8] 04/27/2022 Yes    History of cancer of vulva [Z85.44]  Not Applicable    ESRD on dialysis MWF [N18.6, Z99.2] 03/15/2021 Not Applicable     Chronic    Anemia of chronic disease [D63.8] 02/10/2020 Yes     Chronic    Chronic diarrhea [K52.9] 02/10/2020 Yes     Chronic      Problems Resolved During this Admission:    Diagnosis Date Noted Date Resolved POA    Vulvar abnormality in pregnancy [O34.70] 04/23/2022 04/23/2022 Unknown       Discharged Condition: good    Disposition: Home or Self Care    Follow Up:   Follow-up Information     April Horton MD Follow up in 1 week(s).    Specialty: Internal Medicine  Contact information:  [address]  [address]  [place] [address]  [phone]             Alla Neal MD Follow up in 1 week(s).    Specialty: Infectious Diseases  Contact information:  [address]  [address]  [place] [address]  [phone]                       Patient Instructions:      Activity as tolerated       Significant Diagnostic Studies: Labs:   BMP:   Recent Labs   Lab 04/29/22  0455   GLU 91      K 3.8      CO2 24   BUN 22   CREATININE 4.1*   CALCIUM 8.9   MG 1.6   , CMP   Recent Labs   Lab 04/29/22  0455      K 3.8      CO2 24   GLU 91   BUN 22   CREATININE 4.1*   CALCIUM 8.9   ANIONGAP 12   ESTGFRAFRICA 11.8*   EGFRNONAA 10.2*   , CBC   Recent Labs   Lab 04/29/22  0455   WBC 7.29   HGB 11.4*   HCT 36.7*       and All labs within the past 24 hours have been reviewed  Microbiology:   Blood Culture   Lab Results   Component Value Date    LABBLOO No growth after 5 days. 01/16/2022    and Urine Culture     Lab Results   Component Value Date    LABURIN  04/26/2022     Multiple organisms isolated. None in predominance.  Repeat if    LABURIN clinically necessary. 04/26/2022     Radiology: X-Ray: CXR: X-Ray Chest 1 View (CXR): No results found for this visit on 04/23/22. and X-Ray Chest PA and Lateral (CXR): No results found for this visit on 04/23/22. and KUB: X-Ray Abdomen AP 1 View (KUB):   Results for orders placed or performed during the hospital encounter of 04/23/22   X-Ray Abdomen AP 1 View    Narrative    Examination: Single AP view of the abdomen.    CLINICAL HISTORY: Constipation.    COMPARISON: 4/28/2022.    FINDINGS: Left lung base minimal fibrotic scarring with interstitial densities communicating with the left diaphragmatic surface. Eventration of the right hemidiaphragm noted. Deployment of a right-sided nephrostomy tube the proximal and: The renal pelvis and the distal end within the urinary bladder and opposition with evidence of linear density changes along side the midportion extending due to multiple intrauterine catheter demonstrate no evidence of further migration upon comparison. There is evidence of arrogated calcifications outlining the renal pelvis towards the left. Dilated small bowel loops are noted without areas of disproportionate distention. Clips in the right upper quadrant are related to previous gallbladder resection. Moderate levoconvex alignment of the lumbar spine with rotational component. There is marginal sclerosis about the right SI joint.    IMPRESSION:  1. Right-sided ureteral stent in optimal position with evidence of grouping of uniform calcification about the lateral edge of the right-sided ureteral stent.  2. Small grouping of uniform calcification about the lateral and show no further migration upon comparison.    Electronically signed by:  Rashi Medina MD  4/30/2022 8:53 AM CDT Workstation: [id number]     CT scan: CT ABDOMEN PELVIS WITH CONTRAST:   Results for orders  placed or performed during the hospital encounter of 04/23/22   CT Abdomen Pelvis With Contrast    Narrative    CMS MANDATED QUALITY DATA - CT RADIATION  436    All CT scans at this facility utilize dose modulation, iterative reconstruction, and/or weight based dosing when appropriate to reduce radiation dose to as low as reasonably achievable.    CLINICAL HISTORY:  72 years (1950) Female Soft tissue infection suspected, pelvis, xray done; h/o recurrent perineal cellulitis, worsening despite abx Original order was a Pelvis with, changed to abd/pelvis due to abnormal gas pattern    TECHNIQUE:  CT ABDOMEN PELVIS WITH IV CONTRAST. 416 images obtained. Axial CT images of the abdomen and pelvis were obtained from the dome of the diaphragm to the proximal thigh.    CONTRAST:  100 mL of IV Omni 350 was administered uneventfully by IV    COMPARISON:  Most recent CT from January 16, 2022.    FINDINGS:.  Lower Thorax:  The lungs are essentially clear noting mild bilateral dependent atelectasis versus scarring. There is no pleural or pericardial effusion. There are scattered coronary arterial calcifications.    CT Abdomen:  Liver: The liver is normal in size and imaging appearance.  Gallbladder: Cholecystectomy.  Biliary Tree: Mild central intrahepatic and extrahepatic biliary ductal dilation with a transition point in the periampullary CBD, consider correlation with liver function tests and for history of Ellis mean. This is unchanged when compared to the previous exam.  Spleen: Within normal limits.  Pancreas: The pancreas is normal.  Adrenal Glands: The adrenal glands appear within normal limits.  Kidneys: Right-sided double-J ureteral stent in expected configuration with proximal coil projecting of the renal pelvis and distal coil projecting of the bladder. There is no hydronephrosis or hydroureter. There is a 6 x 5 mm triangle or stone projecting along the mid ureter, and additional 5 x 4 mm stone projecting along  the distal right ureter. There are scattered bilateral additional nonobstructing renal stones with calcification along the medullary. Mid compatible with medullary nephrocalcinosis.  Vasculature: There are scattered atheromatous mural plaques in the aorta and its major branch vessels with no aneurysm seen.  Lymph nodes: No abdominal lymphadenopathy is seen.  Intraperitoneal structures: There is no ascites.  Bowel: Large volume of stool and gas seen in the colon with a nonobstructive bowel gas pattern, consider correlation for constipation.  Abdominal wall: The abdominal wall and musculature are normal.  Musculoskeletal: There is degenerative disc disease and facet arthropathy in the lumbar spine with no aggressive appearing lytic or blastic lesions .    CT Pelvis:  Bladder: The bladder is partially decompressed. That being said, there is mild circumferential bladder wall thickening. In the appropriate clinical setting this may relate to a mild degree of infection or inflammation; consider correlation with urinalysis.  Reproductive Organs: The uterus is not visualized/surgically absent. There is nonspecific thickening of the vaginal wall.  Pelvic Lymph nodes: No pelvic lymphadenopathy or pelvic mass is identified.    IMPRESSION:  1. Large volume of stool and gas in the colon with a nonobstructive bowel gas pattern, consider correlation for constipation.  2. Nonspecific thickening of the vulvovaginal skin suggestive of cellulitis, consider correlation with physical exam. No finding of abscess or gas forming infection (necrotizing fasciitis/Iman's gangrene).  3. Circumferential bladder wall thickening, consider correlation with urinalysis for cystitis.  4. Right-sided double-J ureteral stent in expected configuration without hydronephrosis.  5. Stones in the right mid and distal ureter.  6. Numerous additional bilateral nonobstructing renal stones, and findings compatible with medullary nephrocalcinosis.  7.  Unchanged mild central intrahepatic and extrahepatic biliary ductal dilation.  8. Additional and incidental findings as above, similar to the previous exam.              .    Electronically signed by:  Rashi Wilder MD  4/23/2022 12:56 PM CDT Workstation: [id number]    and CT ABDOMEN PELVIS WITHOUT CONTRAST: No results found for this visit on 04/23/22.    Pending Diagnostic Studies:     None         Medications:  Reconciled Home Medications:      Medication List      START taking these medications    cyanocobalamin 1,000 mcg/mL injection  Inject 1 mL (1,000 mcg total) into the muscle once daily. INJECT 1000MCG ONCE DAILY FOR 5 DAYS THEN CONTINUE ON 1000MCG ONCE WEEKLY FOR 4 WEEKS for 10 doses  Start taking on: May 1, 2022     hydrALAZINE 50 MG tablet  Commonly known as: APRESOLINE  Take 1 tablet (50 mg total) by mouth every 8 (eight) hours.     levoFLOXacin 250 MG tablet  Commonly known as: LEVAQUIN  Take 1 tablet (250 mg total) by mouth once daily. for 5 days     LIDOcaine HCL 2% 2 % jelly  Commonly known as: XYLOCAINE  Apply topically as needed.     LORazepam 0.5 MG tablet  Commonly known as: ATIVAN  Take 1 tablet (0.5 mg total) by mouth every 6 (six) hours as needed for Anxiety.     nystatin-triamcinolone cream  Commonly known as: MYCOLOG II  Apply topically 4 (four) times daily.        CHANGE how you take these medications    promethazine 25 MG tablet  Commonly known as: PHENERGAN  TAKE 1 TABLET(25 MG) BY MOUTH TWICE DAILY AS NEEDED FOR NAUSEA  What changed: See the new instructions.        CONTINUE taking these medications    AURYXIA 210 mg iron Tab  Generic drug: ferric citrate  Take 2 tablets by mouth 3 (three) times daily with meals.     cyclobenzaprine 10 MG tablet  Commonly known as: FLEXERIL  TAKE ONE TABLET BY MOUTH THREE TIMES DAILY AS NEEDED FOR MUSCLE SPASMS     diltiaZEM 120 MG Cp24  Commonly known as: CARDIZEM CD  TAKE 1 CAPSULE DAILY     diphenhydrAMINE 25 mg capsule  Commonly known as:  BENADRYL  Take 25 mg by mouth every 12 (twelve) hours as needed for Itching.     gabapentin 100 MG capsule  Commonly known as: NEURONTIN  Take 100 mg by mouth 2 (two) times daily. HCS     HYDROcodone-acetaminophen 5-325 mg per tablet  Commonly known as: NORCO  Take 1 tablet by mouth every 8 (eight) hours as needed for Pain.     hydrOXYzine HCL 10 MG Tab  Commonly known as: ATARAX  Take 1 tablet (10 mg total) by mouth 3 (three) times daily as needed.     levothyroxine 88 MCG tablet  Commonly known as: SYNTHROID  TAKE 1 TABLET BEFORE BREAKFAST     QUESTRAN AND AQUAPHOR TOPICAL COMPOUND  Apply to affected area twice daily     rOPINIRole 0.25 MG tablet  Commonly known as: REQUIP  Take 0.25 mg by mouth 3 (three) times daily. 1 tablet in the morning and 2 tablets at night     sertraline 50 MG tablet  Commonly known as: ZOLOFT  Take 1 tablet (50 mg total) by mouth every evening. Take 50 mg by mouth every evening.     TYLENOL EXTRA STRENGTH 500 MG tablet  Generic drug: acetaminophen  Take 1,000 mg by mouth daily as needed for Pain.        STOP taking these medications    cefUROXime 250 MG tablet  Commonly known as: CEFTIN     doxycycline 100 MG tablet  Commonly known as: VIBRA-TABS     miconazole 2 % cream  Commonly known as: MICOTIN            Indwelling Lines/Drains at time of discharge:   Lines/Drains/Airways     Central Venous Catheter Line  Duration                Hemodialysis Catheter 01/25/22 1151 right internal jugular 94 days          Drain  Duration                Hemodialysis AV Fistula Left upper arm -- days         Ureteral Drain/Stent 01/18/22 1114 Right ureter 7 Fr. 101 days                Time spent on the discharge of patient: 40 minutes         Brenda Stewart MD  Department of Hospital Medicine  [facility]

## 2022-05-05 NOTE — TELEPHONE ENCOUNTER
Ms Hutchins called with an update  ( [phone] )    Patient was discharged from [facility] on 4/30/22    Patient states she is better than when she was hospitalized   But not completely well.    She denies fever ; states she is still swollen and skin is  Off labia

## 2022-05-05 NOTE — TELEPHONE ENCOUNTER
She is up an daround but getting better. Still raw, one is healed.  but the swelling has gone down a lot. No UTI symptoms. She did have success getting her bowels cleaned out. She is trying to supplement protein.

## 2022-05-10 NOTE — TELEPHONE ENCOUNTER
Patient in the office today   The Tramadol prescription was not sent to [facility] today. The Lorazepam was sent to [facility] today   Tramadol is pended for local pharmacy

## 2022-05-10 NOTE — PROGRESS NOTES
SUBJECTIVE:    Patient ID: Shara Hutchins is a 72 y.o. female.    Chief Complaint: Medication Refill and Follow-up    HPI     Follow-up medications--she was hospitalized for UTI and was also found to be B12 deficient-now on B12 IM-she is now post antibiotic therapy-    Here right leg has been swelling-no hx DVT-only the right leg is swelling    Labs-CBC Hct 36.7 Cr 4.1    Back pain again, chroni neck pain, pain in  hand-she cant take norco so I told her how to dispense of it-takes tramadol sparingly    Admission on 09/15/2021, Discharged on 09/16/2021   Component Date Value Ref Range Status    WBC 09/15/2021 6.71  3.90 - 12.70 K/uL Final    RBC 09/15/2021 2.31 (A) 4.00 - 5.40 M/uL Final    Hemoglobin 09/15/2021 8.4 (A) 12.0 - 16.0 g/dL Final    Hematocrit 09/15/2021 26.0 (A) 37.0 - 48.5 % Final    MCV 09/15/2021 113 (A) 82 - 98 fL Final    MCH 09/15/2021 36.4 (A) 27.0 - 31.0 pg Final    MCHC 09/15/2021 32.3  32.0 - 36.0 g/dL Final    RDW 09/15/2021 16.1 (A) 11.5 - 14.5 % Final    Platelets 09/15/2021 179  150 - 450 K/uL Final    MPV 09/15/2021 10.0  9.2 - 12.9 fL Final    Immature Granulocytes 09/15/2021 0.3  0.0 - 0.5 % Final    Gran # (ANC) 09/15/2021 5.2  1.8 - 7.7 K/uL Final    Immature Grans (Abs) 09/15/2021 0.02  0.00 - 0.04 K/uL Final    Lymph # 09/15/2021 0.8 (A) 1.0 - 4.8 K/uL Final    Mono # 09/15/2021 0.5  0.3 - 1.0 K/uL Final    Eos # 09/15/2021 0.1  0.0 - 0.5 K/uL Final    Baso # 09/15/2021 0.01  0.00 - 0.20 K/uL Final    nRBC 09/15/2021 0  0 /100 WBC Final    Gran % 09/15/2021 78.0 (A) 38.0 - 73.0 % Final    Lymph % 09/15/2021 12.5 (A) 18.0 - 48.0 % Final    Mono % 09/15/2021 7.5  4.0 - 15.0 % Final    Eosinophil % 09/15/2021 1.6  0.0 - 8.0 % Final    Basophil % 09/15/2021 0.1  0.0 - 1.9 % Final    Differential Method 09/15/2021 Automated   Final    Sodium 09/15/2021 141  136 - 145 mmol/L Final    Potassium 09/15/2021 3.0 (A) 3.5 - 5.1 mmol/L Final    Chloride 09/15/2021 99   95 - 110 mmol/L Final    CO2 09/15/2021 28  23 - 29 mmol/L Final    Glucose 09/15/2021 123 (A) 70 - 110 mg/dL Final    BUN 09/15/2021 13  8 - 23 mg/dL Final    Creatinine 09/15/2021 2.6 (A) 0.5 - 1.4 mg/dL Final    Calcium 09/15/2021 8.2 (A) 8.7 - 10.5 mg/dL Final    Total Protein 09/15/2021 6.2  6.0 - 8.4 g/dL Final    Albumin 09/15/2021 3.2 (A) 3.5 - 5.2 g/dL Final    Total Bilirubin 09/15/2021 0.7  0.1 - 1.0 mg/dL Final    Alkaline Phosphatase 09/15/2021 99  55 - 135 U/L Final    AST 09/15/2021 15  10 - 40 U/L Final    ALT 09/15/2021 8 (A) 10 - 44 U/L Final    Anion Gap 09/15/2021 14  8 - 16 mmol/L Final    eGFR if  09/15/2021 20.6 (A) >60 mL/min/1.73 m^2 Final    eGFR if non African American 09/15/2021 17.9 (A) >60 mL/min/1.73 m^2 Final    Magnesium 09/15/2021 1.4 (A) 1.6 - 2.6 mg/dL Final    aPTT 09/15/2021 30.6  25.6 - 35.8 sec Final    PT 09/15/2021 16.2 (A) 11.8 - 14.3 sec Final    INR 09/15/2021 1.4   Final    Group & Rh 09/15/2021 A POS   Final    Indirect Bruno 09/15/2021 NEG   Final    SARS-CoV-2 RNA, Amplification, Qual 09/15/2021 Negative  Negative Final    UNIT NUMBER 09/15/2021 [id number]   Final    Product Code 09/15/2021 [id number]   Final    DISPENSE STATUS 09/15/2021 TRANSFUSED   Final    CODING SYSTEM 09/15/2021 [id number]   Final    Unit Blood Type Code 09/15/2021 6200   Final    Unit Blood Type 09/15/2021 A POS   Final    Unit Expiration 09/15/2021 [id number]   Final    Troponin I 09/15/2021 <0.030  <=0.040 ng/mL Final    Troponin I 09/16/2021 <0.030  <=0.040 ng/mL Final    WBC 09/16/2021 6.09  3.90 - 12.70 K/uL Final    RBC 09/16/2021 2.48 (A) 4.00 - 5.40 M/uL Final    Hemoglobin 09/16/2021 9.0 (A) 12.0 - 16.0 g/dL Final    Hematocrit 09/16/2021 28.2 (A) 37.0 - 48.5 % Final    MCV 09/16/2021 114 (A) 82 - 98 fL Final    MCH 09/16/2021 36.3 (A) 27.0 - 31.0 pg Final    MCHC 09/16/2021 31.9 (A) 32.0 - 36.0 g/dL Final    RDW 09/16/2021 18.1  (A) 11.5 - 14.5 % Final    Platelets 09/16/2021 158  150 - 450 K/uL Final    MPV 09/16/2021 9.9  9.2 - 12.9 fL Final    Immature Granulocytes 09/16/2021 0.5  0.0 - 0.5 % Final    Gran # (ANC) 09/16/2021 4.4  1.8 - 7.7 K/uL Final    Immature Grans (Abs) 09/16/2021 0.03  0.00 - 0.04 K/uL Final    Lymph # 09/16/2021 1.1  1.0 - 4.8 K/uL Final    Mono # 09/16/2021 0.5  0.3 - 1.0 K/uL Final    Eos # 09/16/2021 0.1  0.0 - 0.5 K/uL Final    Baso # 09/16/2021 0.02  0.00 - 0.20 K/uL Final    nRBC 09/16/2021 0  0 /100 WBC Final    Gran % 09/16/2021 71.5  38.0 - 73.0 % Final    Lymph % 09/16/2021 17.2 (A) 18.0 - 48.0 % Final    Mono % 09/16/2021 8.5  4.0 - 15.0 % Final    Eosinophil % 09/16/2021 2.0  0.0 - 8.0 % Final    Basophil % 09/16/2021 0.3  0.0 - 1.9 % Final    Differential Method 09/16/2021 Automated   Final    Sodium 09/16/2021 141  136 - 145 mmol/L Final    Potassium 09/16/2021 3.8  3.5 - 5.1 mmol/L Final    Chloride 09/16/2021 100  95 - 110 mmol/L Final    CO2 09/16/2021 30 (A) 23 - 29 mmol/L Final    Glucose 09/16/2021 78  70 - 110 mg/dL Final    BUN 09/16/2021 22  8 - 23 mg/dL Final    Creatinine 09/16/2021 3.6 (A) 0.5 - 1.4 mg/dL Final    Calcium 09/16/2021 8.0 (A) 8.7 - 10.5 mg/dL Final    Total Protein 09/16/2021 5.8 (A) 6.0 - 8.4 g/dL Final    Albumin 09/16/2021 3.0 (A) 3.5 - 5.2 g/dL Final    Total Bilirubin 09/16/2021 0.7  0.1 - 1.0 mg/dL Final    Alkaline Phosphatase 09/16/2021 96  55 - 135 U/L Final    AST 09/16/2021 11  10 - 40 U/L Final    ALT 09/16/2021 9 (A) 10 - 44 U/L Final    Anion Gap 09/16/2021 11  8 - 16 mmol/L Final    eGFR if  09/16/2021 13.9 (A) >60 mL/min/1.73 m^2 Final    eGFR if non  09/16/2021 12.1 (A) >60 mL/min/1.73 m^2 Final   Lab Visit on 05/25/2021   Component Date Value Ref Range Status    Total Protein 05/25/2021 6.7  6.0 - 8.4 g/dL Final    Albumin 05/25/2021 3.7  3.5 - 5.2 g/dL Final    Total Bilirubin 05/25/2021  0.9  0.1 - 1.0 mg/dL Final    Bilirubin, Direct 05/25/2021 0.2  0.1 - 0.3 mg/dL Final    AST 05/25/2021 15  10 - 40 U/L Final    ALT 05/25/2021 12  10 - 44 U/L Final    Alkaline Phosphatase 05/25/2021 119  55 - 135 U/L Final    Cholesterol 05/25/2021 95 (A) 120 - 199 mg/dL Final    Triglycerides 05/25/2021 73  30 - 150 mg/dL Final    HDL 05/25/2021 57  40 - 75 mg/dL Final    LDL Cholesterol 05/25/2021 23.4 (A) 63.0 - 159.0 mg/dL Final    HDL/Cholesterol Ratio 05/25/2021 60.0 (A) 20.0 - 50.0 % Final    Total Cholesterol/HDL Ratio 05/25/2021 1.7 (A) 2.0 - 5.0 Final    Non-HDL Cholesterol 05/25/2021 38  mg/dL Final    Hemoglobin A1C 05/25/2021 4.6  4.5 - 6.2 % Final    Estimated Avg Glucose 05/25/2021 85  68 - 131 mg/dL Final   Admission on 05/17/2021, Discharged on 05/19/2021   Component Date Value Ref Range Status    Blood Culture, Routine 05/17/2021 No growth after 5 days.   Final    Blood Culture, Routine 05/17/2021 No growth after 5 days.   Final    WBC 05/17/2021 8.46  3.90 - 12.70 K/uL Final    RBC 05/17/2021 3.43 (A) 4.00 - 5.40 M/uL Final    Hemoglobin 05/17/2021 11.4 (A) 12.0 - 16.0 g/dL Final    Hematocrit 05/17/2021 35.0 (A) 37.0 - 48.5 % Final    MCV 05/17/2021 102 (A) 82 - 98 fL Final    MCH 05/17/2021 33.2 (A) 27.0 - 31.0 pg Final    MCHC 05/17/2021 32.6  32.0 - 36.0 g/dL Final    RDW 05/17/2021 14.2  11.5 - 14.5 % Final    Platelets 05/17/2021 180  150 - 450 K/uL Final    MPV 05/17/2021 9.8  9.2 - 12.9 fL Final    Immature Granulocytes 05/17/2021 0.6 (A) 0.0 - 0.5 % Final    Gran # (ANC) 05/17/2021 7.2  1.8 - 7.7 K/uL Final    Immature Grans (Abs) 05/17/2021 0.05 (A) 0.00 - 0.04 K/uL Final    Lymph # 05/17/2021 0.4 (A) 1.0 - 4.8 K/uL Final    Mono # 05/17/2021 0.8  0.3 - 1.0 K/uL Final    Eos # 05/17/2021 0.0  0.0 - 0.5 K/uL Final    Baso # 05/17/2021 0.02  0.00 - 0.20 K/uL Final    nRBC 05/17/2021 0  0 /100 WBC Final    Gran % 05/17/2021 85.6 (A) 38.0 - 73.0 % Final     Lymph % 05/17/2021 4.5 (A) 18.0 - 48.0 % Final    Mono % 05/17/2021 8.9  4.0 - 15.0 % Final    Eosinophil % 05/17/2021 0.2  0.0 - 8.0 % Final    Basophil % 05/17/2021 0.2  0.0 - 1.9 % Final    Differential Method 05/17/2021 Automated   Final    Sodium 05/17/2021 139  136 - 145 mmol/L Final    Potassium 05/17/2021 3.1 (A) 3.5 - 5.1 mmol/L Final    Chloride 05/17/2021 97  95 - 110 mmol/L Final    CO2 05/17/2021 27  23 - 29 mmol/L Final    Glucose 05/17/2021 107  70 - 110 mg/dL Final    BUN 05/17/2021 17  8 - 23 mg/dL Final    Creatinine 05/17/2021 3.7 (A) 0.5 - 1.4 mg/dL Final    Calcium 05/17/2021 8.7  8.7 - 10.5 mg/dL Final    Total Protein 05/17/2021 6.9  6.0 - 8.4 g/dL Final    Albumin 05/17/2021 3.3 (A) 3.5 - 5.2 g/dL Final    Total Bilirubin 05/17/2021 0.7  0.1 - 1.0 mg/dL Final    Alkaline Phosphatase 05/17/2021 171 (A) 55 - 135 U/L Final    AST 05/17/2021 9 (A) 10 - 40 U/L Final    ALT 05/17/2021 9 (A) 10 - 44 U/L Final    Anion Gap 05/17/2021 15  8 - 16 mmol/L Final    eGFR if  05/17/2021 13 (A) >60 mL/min/1.73 m^2 Final    eGFR if non  05/17/2021 12 (A) >60 mL/min/1.73 m^2 Final    Lactate (Lactic Acid) 05/17/2021 1.6  0.5 - 2.2 mmol/L Final    Specimen UA 05/17/2021 Urine, Clean Catch   Final    Color, UA 05/17/2021 Yellow  Yellow, Straw, Na Final    Appearance, UA 05/17/2021 Hazy (A) Clear Final    pH, UA 05/17/2021 8.0  5.0 - 8.0 Final    Specific Gravity, UA 05/17/2021 1.015  1.005 - 1.030 Final    Protein, UA 05/17/2021 2+ (A) Negative Final    Glucose, UA 05/17/2021 Negative  Negative Final    Ketones, UA 05/17/2021 Negative  Negative Final    Bilirubin (UA) 05/17/2021 Negative  Negative Final    Occult Blood UA 05/17/2021 2+ (A) Negative Final    Nitrite, UA 05/17/2021 Negative  Negative Final    Urobilinogen, UA 05/17/2021 Negative  <2.0 EU/dL Final    Leukocytes, UA 05/17/2021 3+ (A) Negative Final    Magnesium 05/17/2021 1.6   1.6 - 2.6 mg/dL Final    Procalcitonin 05/17/2021 6.98 (A) <0.25 ng/mL Final    RBC, UA 05/17/2021 7 (A) 0 - 4 /hpf Final    WBC, UA 05/17/2021 80 (A) 0 - 5 /hpf Final    WBC Clumps, UA 05/17/2021 Occasional (A) None-Rare Final    Bacteria 05/17/2021 Few (A) None-Occ /hpf Final    Squam Epithel, UA 05/17/2021 1  /hpf Final    Hyaline Casts, UA 05/17/2021 0  0-1/lpf /lpf Final    Microscopic Comment 05/17/2021 SEE COMMENT   Final    Urine Culture, Routine 05/17/2021 Multiple organisms isolated. None in predominance.  Repeat if   Final    Urine Culture, Routine 05/17/2021 clinically necessary.   Final    SARS-CoV-2 RNA, Amplification, Qual 05/17/2021 Negative  Negative Final    Sodium 05/18/2021 139  136 - 145 mmol/L Final    Potassium 05/18/2021 3.0 (A) 3.5 - 5.1 mmol/L Final    Chloride 05/18/2021 99  95 - 110 mmol/L Final    CO2 05/18/2021 25  23 - 29 mmol/L Final    Glucose 05/18/2021 80  70 - 110 mg/dL Final    BUN 05/18/2021 23  8 - 23 mg/dL Final    Creatinine 05/18/2021 4.6 (A) 0.5 - 1.4 mg/dL Final    Calcium 05/18/2021 8.0 (A) 8.7 - 10.5 mg/dL Final    Total Protein 05/18/2021 5.7 (A) 6.0 - 8.4 g/dL Final    Albumin 05/18/2021 2.8 (A) 3.5 - 5.2 g/dL Final    Total Bilirubin 05/18/2021 0.5  0.1 - 1.0 mg/dL Final    Alkaline Phosphatase 05/18/2021 149 (A) 55 - 135 U/L Final    AST 05/18/2021 7 (A) 10 - 40 U/L Final    ALT 05/18/2021 6 (A) 10 - 44 U/L Final    Anion Gap 05/18/2021 15  8 - 16 mmol/L Final    eGFR if African American 05/18/2021 10 (A) >60 mL/min/1.73 m^2 Final    eGFR if non African American 05/18/2021 9 (A) >60 mL/min/1.73 m^2 Final    Magnesium 05/18/2021 1.5 (A) 1.6 - 2.6 mg/dL Final    WBC 05/18/2021 6.03  3.90 - 12.70 K/uL Final    RBC 05/18/2021 2.92 (A) 4.00 - 5.40 M/uL Final    Hemoglobin 05/18/2021 9.8 (A) 12.0 - 16.0 g/dL Final    Hematocrit 05/18/2021 30.6 (A) 37.0 - 48.5 % Final    MCV 05/18/2021 105 (A) 82 - 98 fL Final    MCH 05/18/2021 33.6 (A)  27.0 - 31.0 pg Final    MCHC 05/18/2021 32.0  32.0 - 36.0 g/dL Final    RDW 05/18/2021 14.1  11.5 - 14.5 % Final    Platelets 05/18/2021 156  150 - 450 K/uL Final    MPV 05/18/2021 9.9  9.2 - 12.9 fL Final    Immature Granulocytes 05/18/2021 0.2  0.0 - 0.5 % Final    Gran # (ANC) 05/18/2021 4.5  1.8 - 7.7 K/uL Final    Immature Grans (Abs) 05/18/2021 0.01  0.00 - 0.04 K/uL Final    Lymph # 05/18/2021 0.8 (A) 1.0 - 4.8 K/uL Final    Mono # 05/18/2021 0.7  0.3 - 1.0 K/uL Final    Eos # 05/18/2021 0.1  0.0 - 0.5 K/uL Final    Baso # 05/18/2021 0.01  0.00 - 0.20 K/uL Final    nRBC 05/18/2021 0  0 /100 WBC Final    Gran % 05/18/2021 74.0 (A) 38.0 - 73.0 % Final    Lymph % 05/18/2021 12.9 (A) 18.0 - 48.0 % Final    Mono % 05/18/2021 11.4  4.0 - 15.0 % Final    Eosinophil % 05/18/2021 1.3  0.0 - 8.0 % Final    Basophil % 05/18/2021 0.2  0.0 - 1.9 % Final    Differential Method 05/18/2021 Automated   Final    Stone Source 05/18/2021 Kidney   Final    Stone Analysis 05/18/2021 Test Not Performed   Final    Stone Analysis Comment 05/18/2021 100% Calcium oxalate monohydrate   Final    Sodium 05/19/2021 140  136 - 145 mmol/L Final    Potassium 05/19/2021 3.8  3.5 - 5.1 mmol/L Final    Chloride 05/19/2021 102  95 - 110 mmol/L Final    CO2 05/19/2021 23  23 - 29 mmol/L Final    Glucose 05/19/2021 107  70 - 110 mg/dL Final    BUN 05/19/2021 35 (A) 8 - 23 mg/dL Final    Creatinine 05/19/2021 5.5 (A) 0.5 - 1.4 mg/dL Final    Calcium 05/19/2021 8.5 (A) 8.7 - 10.5 mg/dL Final    Total Protein 05/19/2021 6.0  6.0 - 8.4 g/dL Final    Albumin 05/19/2021 2.9 (A) 3.5 - 5.2 g/dL Final    Total Bilirubin 05/19/2021 0.4  0.1 - 1.0 mg/dL Final    Alkaline Phosphatase 05/19/2021 195 (A) 55 - 135 U/L Final    AST 05/19/2021 9 (A) 10 - 40 U/L Final    ALT 05/19/2021 7 (A) 10 - 44 U/L Final    Anion Gap 05/19/2021 15  8 - 16 mmol/L Final    eGFR if  05/19/2021 8 (A) >60 mL/min/1.73 m^2 Final     eGFR if non African American 05/19/2021 7 (A) >60 mL/min/1.73 m^2 Final    Magnesium 05/19/2021 1.6  1.6 - 2.6 mg/dL Final    WBC 05/19/2021 4.74  3.90 - 12.70 K/uL Final    RBC 05/19/2021 3.16 (A) 4.00 - 5.40 M/uL Final    Hemoglobin 05/19/2021 10.5 (A) 12.0 - 16.0 g/dL Final    Hematocrit 05/19/2021 33.2 (A) 37.0 - 48.5 % Final    MCV 05/19/2021 105 (A) 82 - 98 fL Final    MCH 05/19/2021 33.2 (A) 27.0 - 31.0 pg Final    MCHC 05/19/2021 31.6 (A) 32.0 - 36.0 g/dL Final    RDW 05/19/2021 13.6  11.5 - 14.5 % Final    Platelets 05/19/2021 197  150 - 450 K/uL Final    MPV 05/19/2021 9.9  9.2 - 12.9 fL Final    Immature Granulocytes 05/19/2021 0.2  0.0 - 0.5 % Final    Gran # (ANC) 05/19/2021 3.7  1.8 - 7.7 K/uL Final    Immature Grans (Abs) 05/19/2021 0.01  0.00 - 0.04 K/uL Final    Lymph # 05/19/2021 0.7 (A) 1.0 - 4.8 K/uL Final    Mono # 05/19/2021 0.3  0.3 - 1.0 K/uL Final    Eos # 05/19/2021 0.0  0.0 - 0.5 K/uL Final    Baso # 05/19/2021 0.01  0.00 - 0.20 K/uL Final    nRBC 05/19/2021 0  0 /100 WBC Final    Gran % 05/19/2021 78.3 (A) 38.0 - 73.0 % Final    Lymph % 05/19/2021 14.8 (A) 18.0 - 48.0 % Final    Mono % 05/19/2021 6.5  4.0 - 15.0 % Final    Eosinophil % 05/19/2021 0.0  0.0 - 8.0 % Final    Basophil % 05/19/2021 0.2  0.0 - 1.9 % Final    Differential Method 05/19/2021 Automated   Final       Past Medical History:   Diagnosis Date    Cellulitis of trunk     Perineum, numerous episodes    Chronic diarrhea 02/10/2020    Short-gut syndrome    Diabetes mellitus, type 2     pt has not had for years after losing weight    End stage renal disease on dialysis 01/07/2018    Fatigue     Hypertension     Hypothyroidism 2/10/2020    Iron deficiency anemia due to chronic blood loss 1/7/2018    Pulmonary hypertension 3/20/2020    Vulvar cancer 1/7/2019     Past Surgical History:   Procedure Laterality Date    ABDOMINAL SURGERY      ANGIOGRAPHY OF ARTERIOVENOUS SHUNT N/A 1/19/2022     Procedure: Fistulogram with Possible Intervention;  Surgeon: Raz Maher MD;  Location: [facility] CATH/EP LAB;  Service: Cardiothoracic;  Laterality: N/A;    APPENDECTOMY      AV FISTULA PLACEMENT Left 2018    BACK SURGERY      BLADDER FULGURATION N/A 6/16/2020    Procedure: FULGURATION, BLADDER;  Surgeon: Damari Barber Jr., MD;  Location: [facility] OR;  Service: Urology;  Laterality: N/A;    carpel tunnel surgery once on each hand      CERVICAL FUSION      x 4    cervix repair      CHOLECYSTECTOMY  2000    COLECTOMY      CYSTOSCOPY W/ RETROGRADES  6/16/2020    Procedure: CYSTOSCOPY, WITH RETROGRADE PYELOGRAM;  Surgeon: Damari Barber Jr., MD;  Location: [facility] OR;  Service: Urology;;    CYSTOSCOPY W/ RETROGRADES Right 4/20/2021    Procedure: CYSTOSCOPY, WITH RETROGRADE PYELOGRAM;  Surgeon: Damari Barber Jr., MD;  Location[facility];  Service: Urology;  Laterality: Right;    CYSTOSCOPY W/ URETERAL STENT PLACEMENT Right 3/16/2021    Procedure: CYSTOSCOPY, WITH URETERAL STENT INSERTION;  Surgeon: Damari Barber Jr., MD;  Location: [facility] OR;  Service: Urology;  Laterality: Right;    CYSTOSCOPY W/ URETERAL STENT PLACEMENT Right 5/18/2021    Procedure: CYSTOSCOPY, WITH URETERAL STENT INSERTION;  Surgeon: Damari Barber Jr., MD;  Location: [facility] OR;  Service: Urology;  Laterality: Right;    CYSTOSCOPY W/ URETERAL STENT REMOVAL Right 4/20/2021    Procedure: CYSTOSCOPY, WITH URETERAL STENT REMOVAL;  Surgeon: Damari Barber Jr., MD;  Location: [facility] OR;  Service: Urology;  Laterality: Right;    CYSTOSCOPY WITH URETEROSCOPY, RETROGRADE PYELOGRAPHY, AND INSERTION OF STENT Right 3/30/2021    Procedure: CYSTOSCOPY, WITH RETROGRADE PYELOGRAM AND URETERAL STENT INSERTION;  Surgeon: Damari Barber Jr., MD;  Location: [facility] OR;  Service: Urology;  Laterality: Right;    CYSTOURETEROSCOPY WITH RETROGRADE PYELOGRAPHY AND INSERTION OF STENT INTO URETER Right 1/18/2022    Procedure: CYSTOURETEROSCOPY, WITH RETROGRADE PYELOGRAM AND  URETERAL STENT INSERTION;  Surgeon: Damari Barber Jr., MD;  Location: [facility] OR;  Service: Urology;  Laterality: Right;    DIAGNOSTIC LAPAROSCOPY N/A 2/13/2020    Procedure: LAPAROSCOPY, DIAGNOSTIC;  Surgeon: Robbi Lovell III, MD;  Location: [facility] OR;  Service: General;  Laterality: N/A;    HYSTERECTOMY  1980    ILEOSTOMY N/A 2/13/2020    Procedure: CREATION, ILEOSTOMY Loop;  Surgeon: Robbi Lovell III, MD;  Location: [facility] OR;  Service: General;  Laterality: N/A;    ILEOSTOMY CLOSURE N/A 5/18/2020    Procedure: CLOSURE, ILEOSTOMY;  Surgeon: Robbi Lovell III, MD;  Location: [facility] OR;  Service: General;  Laterality: N/A;    LASER LITHOTRIPSY Right 3/30/2021    Procedure: LITHOTRIPSY, USING LASER;  Surgeon: Damari Barber Jr., MD;  Location: [facility];  Service: Urology;  Laterality: Right;    LUMBAR LAMINECTOMY      LYSIS OF ADHESIONS N/A 2/13/2020    Procedure: LYSIS, ADHESIONS;  Surgeon: Robbi Lovell III, MD;  Location: [facility] OR;  Service: General;  Laterality: N/A;    NECK SURGERY      PERCUTANEOUS TRANSLUMINAL ANGIOPLASTY OF ARTERIOVENOUS FISTULA N/A 1/19/2022    Procedure: PTA, AV FISTULA;  Surgeon: Raz Maher MD;  Location: [facility] CATH/EP LAB;  Service: Cardiothoracic;  Laterality: N/A;    PHLEBOGRAPHY  2/28/2020    Procedure: VENOGRAM;  Surgeon: Robbi Lovell III, MD;  Location: [facility] OR;  Service: General;;    REMOVAL OF VASCULAR ACCESS PORT Right 2/13/2020    Procedure: REMOVAL, VASCULAR ACCESS PORT;  Surgeon: Robbi Lovell III, MD;  Location: [facility] OR;  Service: General;  Laterality: Right;    RETROGRADE PYELOGRAPHY  3/16/2021    Procedure: PYELOGRAM, RETROGRADE;  Surgeon: Damari Barber Jr., MD;  Location: [facility] OR;  Service: Urology;;    RETROGRADE PYELOGRAPHY Right 5/18/2021    Procedure: PYELOGRAM, RETROGRADE;  Surgeon: Damari Barber Jr., MD;  Location: [facility] OR;  Service: Urology;  Laterality: Right;    SHOULDER SURGERY      URETEROSCOPIC REMOVAL OF URETERIC  CALCULUS Right 3/30/2021    Procedure: REMOVAL, CALCULUS, URETER, URETEROSCOPIC;  Surgeon: Damari Barber Jr., MD;  Location: [facility] OR;  Service: Urology;  Laterality: Right;    URETEROSCOPIC REMOVAL OF URETERIC CALCULUS Right 4/20/2021    Procedure: REMOVAL, CALCULUS, URETER, URETEROSCOPIC;  Surgeon: Damari Barber Jr., MD;  Location: [facility] OR;  Service: Urology;  Laterality: Right;    vulva cancer       Family History   Problem Relation Age of Onset    Heart disease Mother     Hypertension Mother     Cancer Mother         lung    Heart disease Father     Heart disease Sister         Open heart surgery    No Known Problems Daughter        Marital Status:   Alcohol History:  reports no history of alcohol use.  Tobacco History:  reports that she quit smoking about 22 years ago. Her smoking use included cigarettes. She has a 33.00 pack-year smoking history. She quit smokeless tobacco use about 22 years ago.  Drug History:  reports no history of drug use.    Review of patient's allergies indicates:   Allergen Reactions    Ciprofloxacin Other (See Comments)     Elevated liver enzymes      Pcn [penicillins] Anaphylaxis     TOLERATES ROCEPHIN WITHOUT DIFFICULTY       Current Outpatient Medications:     acetaminophen (TYLENOL) 500 MG tablet, Take 1,000 mg by mouth daily as needed for Pain., Disp: , Rfl:     cyanocobalamin 1,000 mcg/mL injection, Inject 1 mL (1,000 mcg total) into the muscle once daily. INJECT 1000MCG ONCE DAILY FOR 5 DAYS THEN CONTINUE ON 1000MCG ONCE WEEKLY FOR 4 WEEKS for 10 doses, Disp: 10 mL, Rfl: 0    cyclobenzaprine (FLEXERIL) 10 MG tablet, TAKE ONE TABLET BY MOUTH THREE TIMES DAILY AS NEEDED FOR MUSCLE SPASMS, Disp: 30 tablet, Rfl: 1    diltiaZEM (CARDIZEM CD) 120 MG Cp24, TAKE 1 CAPSULE DAILY, Disp: 90 capsule, Rfl: 3    diphenhydrAMINE (BENADRYL) 25 mg capsule, Take 25 mg by mouth every 12 (twelve) hours as needed for Itching., Disp: , Rfl:     ferric citrate (AURYXIA) 210 mg  iron Tab, Take 2 tablets by mouth 3 (three) times daily with meals., Disp: , Rfl:     gabapentin (NEURONTIN) 100 MG capsule, Take 100 mg by mouth 2 (two) times daily. HCS, Disp: , Rfl:     hydrALAZINE (APRESOLINE) 50 MG tablet, Take 1 tablet (50 mg total) by mouth every 8 (eight) hours., Disp: 270 tablet, Rfl: 0    hydrOXYzine HCL (ATARAX) 10 MG Tab, Take 1 tablet (10 mg total) by mouth 3 (three) times daily as needed., Disp: 15 tablet, Rfl: 2    levothyroxine (SYNTHROID) 88 MCG tablet, TAKE 1 TABLET BEFORE BREAKFAST, Disp: 90 tablet, Rfl: 3    LIDOcaine HCL 2% (XYLOCAINE) 2 % jelly, Apply topically as needed., Disp: 30 mL, Rfl: 0    methylPREDNISolone (MEDROL DOSEPACK) 4 mg tablet, Take by mouth., Disp: , Rfl:     promethazine (PHENERGAN) 25 MG tablet, TAKE 1 TABLET(25 MG) BY MOUTH TWICE DAILY AS NEEDED FOR NAUSEA (Patient taking differently: Take 25 mg by mouth 2 (two) times daily as needed for Nausea.), Disp: 60 tablet, Rfl: 0    rOPINIRole (REQUIP) 0.25 MG tablet, Take 0.25 mg by mouth 3 (three) times daily. 1 tablet in the morning and 2 tablets at night, Disp: , Rfl:     sertraline (ZOLOFT) 50 MG tablet, Take 1 tablet (50 mg total) by mouth every evening. Take 50 mg by mouth every evening., Disp: 90 tablet, Rfl: 1    LORazepam (ATIVAN) 0.5 MG tablet, Take 1 tablet (0.5 mg total) by mouth every evening., Disp: 90 tablet, Rfl: 1    traMADoL (ULTRAM) 50 mg tablet, Take 1 tablet (50 mg total) by mouth every 6 (six) hours as needed for Pain., Disp: 30 tablet, Rfl: 1  No current facility-administered medications for this visit.    Facility-Administered Medications Ordered in Other Visits:     electrolyte-S (ISOLYTE), , Intravenous, Continuous, Raz Ruff MD    lactated ringers infusion, , Intravenous, Continuous, Ruy Beltre MD    LIDOcaine (PF) 10 mg/ml (1%) injection 5 mg, 0.5 mL, Intradermal, Once, Raz Ruff MD    Review of Systems   Constitutional: Positive for unexpected weight  "change (weight gain). Negative for activity change, appetite change, chills, diaphoresis, fatigue and fever.   HENT: Positive for hearing loss and rhinorrhea. Negative for congestion, ear pain, nosebleeds, postnasal drip, sinus pressure, sinus pain, sneezing, sore throat, tinnitus, trouble swallowing and voice change.    Eyes: Positive for visual disturbance. Negative for photophobia, pain, discharge and itching.   Respiratory: Negative for apnea, cough, chest tightness, shortness of breath, wheezing and stridor.    Cardiovascular: Negative for chest pain, palpitations and leg swelling.   Gastrointestinal: Positive for diarrhea. Negative for abdominal distention, abdominal pain, blood in stool, constipation, nausea and vomiting.   Endocrine: Negative for cold intolerance, heat intolerance, polydipsia and polyuria.   Genitourinary: Positive for hematuria. Negative for difficulty urinating, dyspareunia, dysuria, flank pain, frequency, menstrual problem, pelvic pain, urgency, vaginal discharge and vaginal pain.   Musculoskeletal: Positive for neck pain. Negative for arthralgias, back pain, joint swelling, myalgias and neck stiffness.   Skin: Negative for pallor.   Allergic/Immunologic: Negative for environmental allergies and food allergies.   Neurological: Positive for weakness and headaches. Negative for dizziness, tremors, speech difficulty, light-headedness and numbness.        RLS   Hematological: Does not bruise/bleed easily.   Psychiatric/Behavioral: Negative for agitation, confusion, decreased concentration, dysphoric mood, sleep disturbance and suicidal ideas. The patient is not nervous/anxious.           Objective:      Vitals:    05/10/22 1324   BP: 128/60   Pulse: 84   Resp: 14   SpO2: 97%   Weight: 66.2 kg (146 lb)   Height: 5' 7.5" (1.715 m)     Physical Exam  Constitutional:       General: She is not in acute distress.     Appearance: Normal appearance. She is not ill-appearing, toxic-appearing or " diaphoretic.   HENT:      Head: Normocephalic and atraumatic.   Eyes:      Extraocular Movements: Extraocular movements intact.   Cardiovascular:      Rate and Rhythm: Normal rate and regular rhythm.      Pulses: Normal pulses.      Heart sounds: Normal heart sounds.   Pulmonary:      Effort: Pulmonary effort is normal.      Breath sounds: Normal breath sounds.   Abdominal:      General: Bowel sounds are normal.      Palpations: Abdomen is soft.   Musculoskeletal:         General: Tenderness (left thumb) present.      Right lower leg: No edema.      Left lower leg: No edema.   Lymphadenopathy:      Cervical: No cervical adenopathy.   Skin:     General: Skin is warm.      Coloration: Skin is pale.      Findings: No lesion or rash.   Neurological:      General: No focal deficit present.      Mental Status: She is alert and oriented to person, place, and time.   Psychiatric:         Mood and Affect: Mood normal.         Thought Content: Thought content normal.         Judgment: Judgment normal.           Assessment:       1. Iron deficiency anemia, unspecified iron deficiency anemia type    2. Pyelonephritis of right kidney    3. Chronic renal failure, stage 4 (severe)    4. Muscle spasms of both lower extremities    5. End-stage renal disease    6. 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 Acute midline low back pain, unspecified whether sciatica present    8. Thumb swelling    9. 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  Dispense: 30 tablet; Refill:  1    Thumb swelling    Primary insomnia  -     LORazepam (ATIVAN) 0.5 MG tablet; Take 1 tablet (0.5 mg total) by mouth every evening.  Dispense: 90 tablet; Refill: 1      Follow up in about 3 months (around 8/10/2022) for FOLLOW UP LABS, FOLLOW UP MEDICATIONS, FOLLOW-UP STATUS.        5/10/2022 April Horton M.D.

## 2022-06-14 NOTE — TELEPHONE ENCOUNTER
Requesting refill for Ativan, Cardizem, Zoloft, and Levothyroxine Last visit 5/10/2022. New mail order pharmacy

## 2022-06-16 PROBLEM — R07.9 CHEST PAIN: Status: ACTIVE | Noted: 2022-01-01

## 2022-06-16 PROBLEM — J43.9 EMPHYSEMA LUNG: Status: ACTIVE | Noted: 2022-01-01

## 2022-06-17 PROBLEM — R19.7 DIARRHEA: Chronic | Status: ACTIVE | Noted: 2020-05-21

## 2022-06-21 NOTE — PROGRESS NOTES
Pharmacist Renal Dose Adjustment Note    Shara Hutchins is a 72 y.o. female being treated with MEROPENEM    Patient Data:    Vital Signs (Most Recent):  Temp: 98.8 °F (37.1 °C) (06/21/22 0908)  Pulse: 69 (06/21/22 1430)  Resp: 18 (06/21/22 1028)  BP: (!) 156/69 (06/21/22 1430)  SpO2: 96 % (06/21/22 1430)   Vital Signs (72h Range):  Temp:  [98.8 °F (37.1 °C)]   Pulse:  [65-78]   Resp:  [18]   BP: (127-176)/(60-77)   SpO2:  [94 %-99 %]      Recent Labs   Lab 06/17/22  0453 06/18/22  0314 06/21/22  0955   CREATININE 4.8* 3.9* 4.3*     Serum creatinine: 4.3 mg/dL (H) 06/21/22 0955  Estimated creatinine clearance: 11.5 mL/min (A)    MEROPENEM 500 MG EVERY 8 HOURS  will be changed to MEROPENEM 500 MG EVERY 24 HOURS.    Pharmacist's Name: Chantel Garcia  Pharmacist's Extension: [phone]

## 2022-06-21 NOTE — PROGRESS NOTES
VANCOMYCIN PHARMACOKINETIC NOTE:  Vancomycin Day # 1    Objective/Assessment:    Diagnosis/Indication for Vancomycin: Urinary Tract Infection     72 y.o., female; Actual Body Weight = 66.7 kg (147 lb).    The patient has the following labs:  6/21/2022 Estimated Creatinine Clearance: 11.5 mL/min (A) (based on SCr of 4.3 mg/dL (H)). Lab Results   Component Value Date    BUN 28 (H) 06/21/2022     Lab Results   Component Value Date    WBC 12.77 (H) 06/21/2022          Plan:  Adjust vancomycin dose and/or frequency based on the patient's actual weight and renal function:  Initiate Vancomycin 1500 mg IV once and future doses based on levels.  Orders have been entered into patient's chart.    Vancomycin dose = 22 mg/kg actual body weight    Vancomycin random  level has been ordered for 17:30 on 6/22.    Pharmacy will manage vancomycin therapy, monitor serum vancomycin levels, monitor renal function and adjust regimen as necessary.    Thank you for allowing us to participate in this patient's care.     Chantel Garcia 6/21/2022 5:48 PM  Department of Pharmacy  Ext [phone]

## 2022-06-21 NOTE — SUBJECTIVE & OBJECTIVE
Past Medical History:   Diagnosis Date    Cellulitis of trunk     Perineum, numerous episodes    Chronic diarrhea 02/10/2020    Short-gut syndrome    Diabetes mellitus, type 2     pt has not had for years after losing weight    End stage renal disease on dialysis 01/07/2018    Fatigue     Hypertension     Hypothyroidism 2/10/2020    Iron deficiency anemia due to chronic blood loss 1/7/2018    Pulmonary hypertension 3/20/2020    Vulvar cancer 1/7/2019       Past Surgical History:   Procedure Laterality Date    ABDOMINAL SURGERY      ANGIOGRAPHY OF ARTERIOVENOUS SHUNT N/A 1/19/2022    Procedure: Fistulogram with Possible Intervention;  Surgeon: Raz Maher MD;  Location: [facility] CATH/EP LAB;  Service: Cardiothoracic;  Laterality: N/A;    APPENDECTOMY      AV FISTULA PLACEMENT Left 2018    BACK SURGERY      BLADDER FULGURATION N/A 6/16/2020    Procedure: FULGURATION, BLADDER;  Surgeon: Damari Barber Jr., MD;  Location: [facility] OR;  Service: Urology;  Laterality: N/A;    carpel tunnel surgery once on each hand      CERVICAL FUSION      x 4    cervix repair      CHOLECYSTECTOMY  2000    COLECTOMY      CYSTOSCOPY W/ RETROGRADES  6/16/2020    Procedure: CYSTOSCOPY, WITH RETROGRADE PYELOGRAM;  Surgeon: Damari Barber Jr., MD;  Location: [facility] OR;  Service: Urology;;    CYSTOSCOPY W/ RETROGRADES Right 4/20/2021    Procedure: CYSTOSCOPY, WITH RETROGRADE PYELOGRAM;  Surgeon: Damari Barber Jr., MD;  Location: [facility] OR;  Service: Urology;  Laterality: Right;    CYSTOSCOPY W/ URETERAL STENT PLACEMENT Right 3/16/2021    Procedure: CYSTOSCOPY, WITH URETERAL STENT INSERTION;  Surgeon: Damari Barber Jr., MD;  Location: [facility] OR;  Service: Urology;  Laterality: Right;    CYSTOSCOPY W/ URETERAL STENT PLACEMENT Right 5/18/2021    Procedure: CYSTOSCOPY, WITH URETERAL STENT INSERTION;  Surgeon: Damari Barber Jr., MD;  Location: [facility] OR;  Service: Urology;  Laterality: Right;    CYSTOSCOPY W/ URETERAL STENT REMOVAL Right 4/20/2021     Procedure: CYSTOSCOPY, WITH URETERAL STENT REMOVAL;  Surgeon: Damari Barber Jr., MD;  Location: [facility] OR;  Service: Urology;  Laterality: Right;    CYSTOSCOPY WITH URETEROSCOPY, RETROGRADE PYELOGRAPHY, AND INSERTION OF STENT Right 3/30/2021    Procedure: CYSTOSCOPY, WITH RETROGRADE PYELOGRAM AND URETERAL STENT INSERTION;  Surgeon: Damari Barber Jr., MD;  Location: [facility] OR;  Service: Urology;  Laterality: Right;    CYSTOURETEROSCOPY WITH RETROGRADE PYELOGRAPHY AND INSERTION OF STENT INTO URETER Right 1/18/2022    Procedure: CYSTOURETEROSCOPY, WITH RETROGRADE PYELOGRAM AND URETERAL STENT INSERTION;  Surgeon: Damari Barber Jr., MD;  Location: [facility] OR;  Service: Urology;  Laterality: Right;    DIAGNOSTIC LAPAROSCOPY N/A 2/13/2020    Procedure: LAPAROSCOPY, DIAGNOSTIC;  Surgeon: Robbi Lovell III, MD;  Location: [facility] OR;  Service: General;  Laterality: N/A;    HYSTERECTOMY  1980    ILEOSTOMY N/A 2/13/2020    Procedure: CREATION, ILEOSTOMY Loop;  Surgeon: Robbi Lovell III, MD;  Location: [facility] OR;  Service: General;  Laterality: N/A;    ILEOSTOMY CLOSURE N/A 5/18/2020    Procedure: CLOSURE, ILEOSTOMY;  Surgeon: Robbi Lovell III, MD;  Location: [facility] OR;  Service: General;  Laterality: N/A;    LASER LITHOTRIPSY Right 3/30/2021    Procedure: LITHOTRIPSY, USING LASER;  Surgeon: Damari Barber Jr., MD;  Location: [facility] OR;  Service: Urology;  Laterality: Right;    LUMBAR LAMINECTOMY      LYSIS OF ADHESIONS N/A 2/13/2020    Procedure: LYSIS, ADHESIONS;  Surgeon: Robbi Lovell III, MD;  Location: [facility] OR;  Service: General;  Laterality: N/A;    NECK SURGERY      PERCUTANEOUS TRANSLUMINAL ANGIOPLASTY OF ARTERIOVENOUS FISTULA N/A 1/19/2022    Procedure: PTA, AV FISTULA;  Surgeon: Raz Maher MD;  Location: [facility] CATH/EP LAB;  Service: Cardiothoracic;  Laterality: N/A;    PHLEBOGRAPHY  2/28/2020    Procedure: VENOGRAM;  Surgeon: Robbi Lovell III, MD;  Location: [facility] OR;  Service: General;;    REMOVAL  OF VASCULAR ACCESS PORT Right 2/13/2020    Procedure: REMOVAL, VASCULAR ACCESS PORT;  Surgeon: Robbi Lovell III, MD;  Location: [facility] OR;  Service: General;  Laterality: Right;    RETROGRADE PYELOGRAPHY  3/16/2021    Procedure: PYELOGRAM, RETROGRADE;  Surgeon: Damari Barber Jr., MD;  Location: [facility] OR;  Service: Urology;;    RETROGRADE PYELOGRAPHY Right 5/18/2021    Procedure: PYELOGRAM, RETROGRADE;  Surgeon: Damari Barber Jr., MD;  Location: [facility] OR;  Service: Urology;  Laterality: Right;    SHOULDER SURGERY      URETEROSCOPIC REMOVAL OF URETERIC CALCULUS Right 3/30/2021    Procedure: REMOVAL, CALCULUS, URETER, URETEROSCOPIC;  Surgeon: Damari Barber Jr., MD;  Location: [facility] OR;  Service: Urology;  Laterality: Right;    URETEROSCOPIC REMOVAL OF URETERIC CALCULUS Right 4/20/2021    Procedure: REMOVAL, CALCULUS, URETER, URETEROSCOPIC;  Surgeon: Damari Barber Jr., MD;  Location: [facility] OR;  Service: Urology;  Laterality: Right;    vulva cancer         Review of patient's allergies indicates:   Allergen Reactions    Ciprofloxacin Other (See Comments)     Elevated liver enzymes      Pcn [penicillins] Anaphylaxis     TOLERATES ROCEPHIN WITHOUT DIFFICULTY       Current Facility-Administered Medications on File Prior to Encounter   Medication    electrolyte-S (ISOLYTE)    lactated ringers infusion    LIDOcaine (PF) 10 mg/ml (1%) injection 5 mg     Current Outpatient Medications on File Prior to Encounter   Medication Sig    albuterol-ipratropium (DUO-NEB) 2.5 mg-0.5 mg/3 mL nebulizer solution Take 3 mLs by nebulization every 6 (six) hours as needed for Wheezing or Shortness of Breath. Rescue    diltiaZEM (CARDIZEM CD) 120 MG Cp24 Take 1 capsule (120 mg total) by mouth once daily.    ferric citrate (AURYXIA) 210 mg iron Tab Take 2 tablets by mouth 3 (three) times daily with meals.    gabapentin (NEURONTIN) 100 MG capsule Take 100 mg by mouth 2 (two) times daily. HCS    hydrALAZINE (APRESOLINE) 50 MG tablet Take 1  tablet (50 mg total) by mouth every 8 (eight) hours.    levothyroxine (SYNTHROID) 88 MCG tablet TAKE 1 TABLET BEFORE BREAKFAST (Patient taking differently: Take 88 mcg by mouth before breakfast. TAKE 1 TABLET BEFORE BREAKFAST)    LORazepam (ATIVAN) 0.5 MG tablet Take 1 tablet (0.5 mg total) by mouth every evening.    predniSONE (DELTASONE) 10 MG tablet Take 3 tablets (30 mg total) by mouth once daily. for 4 days    promethazine (PHENERGAN) 25 MG tablet TAKE 1 TABLET(25 MG) BY MOUTH TWICE DAILY AS NEEDED FOR NAUSEA (Patient taking differently: Take 25 mg by mouth 2 (two) times daily as needed for Nausea.)    rOPINIRole (REQUIP) 0.5 MG tablet Take 0.5 mg by mouth every 8 (eight) hours.    sertraline (ZOLOFT) 50 MG tablet Take 1 tablet (50 mg total) by mouth every evening. Take 50 mg by mouth every evening.    traMADoL (ULTRAM) 50 mg tablet TAKE ONE TABLET (50mg total) BY MOUTH EVERY 6 HOURS AS NEEDED FOR PAIN (Patient taking differently: Take 50 mg by mouth every 6 (six) hours as needed.)    cyanocobalamin 1,000 mcg/mL injection INJECT 1ML ONCE DAILY FOR 5 DAYS THEN CONTINUE ON 1ML ONCE WEEKLY FOR 4 WEEKS FOR 10 DOSES TOTAL (Patient taking differently: Inject 1,000 mcg into the skin once a week.)    cyclobenzaprine (FLEXERIL) 10 MG tablet TAKE ONE TABLET BY MOUTH THREE TIMES DAILY AS NEEDED FOR MUSCLE SPASMS (Patient taking differently: Take 10 mg by mouth 3 (three) times daily as needed.)    diphenhydrAMINE (BENADRYL) 25 mg capsule Take 25 mg by mouth every 12 (twelve) hours as needed for Itching.    hydrOXYzine HCL (ATARAX) 10 MG Tab Take 1 tablet (10 mg total) by mouth 3 (three) times daily as needed.    LIDOcaine HCL 2% (XYLOCAINE) 2 % jelly Apply topically as needed. (Patient taking differently: Apply 2 mLs topically as needed.)    rOPINIRole (REQUIP) 0.25 MG tablet Take 0.25 mg by mouth 3 (three) times daily. 1 tablet in the morning and 2 tablets at night    [DISCONTINUED] miconazole (MICOTIN) 2 % cream Apply  topically 2 (two) times daily.     Family History       Problem Relation (Age of Onset)    Cancer Mother    Heart disease Mother, Father, Sister    Hypertension Mother    No Known Problems Daughter          Tobacco Use    Smoking status: Former Smoker     Packs/day: 1.00     Years: 33.00     Pack years: 33.00     Types: Cigarettes     Quit date: 2000     Years since quittin.4    Smokeless tobacco: Former User     Quit date:    Substance and Sexual Activity    Alcohol use: No    Drug use: No    Sexual activity: Not on file     Review of Systems   Constitutional:  Positive for fatigue.   HENT: Negative.     Eyes: Negative.    Respiratory: Negative.     Cardiovascular: Negative.    Gastrointestinal: Negative.    Endocrine: Negative.    Genitourinary:  Positive for flank pain.   Skin: Negative.    Allergic/Immunologic: Negative.    Neurological: Negative.    Hematological: Negative.    All other systems reviewed and are negative.  Objective:     Vital Signs (Most Recent):  Temp: 98.8 °F (37.1 °C) (22 0908)  Pulse: 65 (22 1128)  Resp: 18 (22 1028)  BP: 127/60 (22 1128)  SpO2: 95 % (22 1128) Vital Signs (24h Range):  Temp:  [98.8 °F (37.1 °C)] 98.8 °F (37.1 °C)  Pulse:  [65-78] 65  Resp:  [18] 18  SpO2:  [95 %-99 %] 95 %  BP: (127-176)/(60-77) 127/60     Weight: 66.7 kg (147 lb)  Body mass index is 23.02 kg/m².    Physical Exam  Vitals and nursing note reviewed.   Constitutional:       Appearance: She is well-developed.   HENT:      Head: Normocephalic and atraumatic.      Right Ear: External ear normal.      Left Ear: External ear normal.      Nose: Nose normal.   Eyes:      Conjunctiva/sclera: Conjunctivae normal.      Pupils: Pupils are equal, round, and reactive to light.   Cardiovascular:      Rate and Rhythm: Normal rate and regular rhythm.      Heart sounds: Normal heart sounds.   Pulmonary:      Effort: Pulmonary effort is normal.      Breath sounds: Normal breath sounds.    Abdominal:      General: Bowel sounds are normal.      Palpations: Abdomen is soft.   Genitourinary:     Comments: Right flank pain to percussion  Musculoskeletal:         General: Normal range of motion.      Cervical back: Normal range of motion and neck supple.   Skin:     General: Skin is warm and dry.      Capillary Refill: Capillary refill takes less than 2 seconds.      Comments: Ecchymoses various stages arms   Neurological:      Mental Status: She is alert and oriented to person, place, and time.   Psychiatric:         Behavior: Behavior normal.         Thought Content: Thought content normal.         Judgment: Judgment normal.         CRANIAL NERVES     CN III, IV, VI   Pupils are equal, round, and reactive to light.     Significant Labs: All pertinent labs within the past 24 hours have been reviewed.  CBC:   Recent Labs   Lab 06/21/22  0955   WBC 12.77*   HGB 11.5*   HCT 36.3*        CMP:   Recent Labs   Lab 06/21/22  0955      K 3.3*      CO2 25   *   BUN 28*   CREATININE 4.3*   CALCIUM 8.6*   PROT 6.5   ALBUMIN 3.5   BILITOT 1.2*   ALKPHOS 103   AST 17   ALT 14   ANIONGAP 9   EGFRNONAA 9.7*     Cardiac Markers: No results for input(s): CKMB, MYOGLOBIN, BNP, TROPISTAT in the last 48 hours.    Significant Imaging: I have reviewed all pertinent imaging results/findings within the past 24 hours.

## 2022-06-21 NOTE — ED PROVIDER NOTES
Encounter Date: 6/21/2022       History     Chief Complaint   Patient presents with    Flank Pain     Right side flank pain onset one hour ago.  Pt on dialysis at [facility] on M,W,F     Patient here with reported right flank pain onset this morning approximately 2 hours prior to arrival patient did attend dialysis yesterday without any problems she does have a history of nephrolithiasis with known stone disease in her right kidney she states that she was feeling fine yesterday she does report nausea vomiting she denies any diarrhea she does still makes some urine but denies any dysuria or noted hematuria is been no recorded fever chills no cough or shortness of breath review of patient's chart reveals that she has a stent in place that was placed in January by Dr. Garcia        Review of patient's allergies indicates:   Allergen Reactions    Ciprofloxacin Other (See Comments)     Elevated liver enzymes      Pcn [penicillins] Anaphylaxis     TOLERATES ROCEPHIN WITHOUT DIFFICULTY     Past Medical History:   Diagnosis Date    Cellulitis of trunk     Perineum, numerous episodes    Chronic diarrhea 02/10/2020    Short-gut syndrome    Diabetes mellitus, type 2     pt has not had for years after losing weight    End stage renal disease on dialysis 01/07/2018    Fatigue     Hypertension     Hypothyroidism 2/10/2020    Iron deficiency anemia due to chronic blood loss 1/7/2018    Pulmonary hypertension 3/20/2020    Vulvar cancer 1/7/2019     Past Surgical History:   Procedure Laterality Date    ABDOMINAL SURGERY      ANGIOGRAPHY OF ARTERIOVENOUS SHUNT N/A 1/19/2022    Procedure: Fistulogram with Possible Intervention;  Surgeon: Raz Maher MD;  Location: [facility] CATH/EP LAB;  Service: Cardiothoracic;  Laterality: N/A;    APPENDECTOMY      AV FISTULA PLACEMENT Left 2018    BACK SURGERY      BLADDER FULGURATION N/A 6/16/2020    Procedure: FULGURATION, BLADDER;  Surgeon: Damari Barber Jr., MD;  Location:  [facility] OR;  Service: Urology;  Laterality: N/A;    carpel tunnel surgery once on each hand      CERVICAL FUSION      x 4    cervix repair      CHOLECYSTECTOMY  2000    COLECTOMY      CYSTOSCOPY W/ RETROGRADES  6/16/2020    Procedure: CYSTOSCOPY, WITH RETROGRADE PYELOGRAM;  Surgeon: Damari Barber Jr., MD;  Location: [facility] OR;  Service: Urology;;    CYSTOSCOPY W/ RETROGRADES Right 4/20/2021    Procedure: CYSTOSCOPY, WITH RETROGRADE PYELOGRAM;  Surgeon: Damari Barber Jr., MD;  Location: [facility] OR;  Service: Urology;  Laterality: Right;    CYSTOSCOPY W/ URETERAL STENT PLACEMENT Right 3/16/2021    Procedure: CYSTOSCOPY, WITH URETERAL STENT INSERTION;  Surgeon: Damari Barber Jr., MD;  Location: [facility] OR;  Service: Urology;  Laterality: Right;    CYSTOSCOPY W/ URETERAL STENT PLACEMENT Right 5/18/2021    Procedure: CYSTOSCOPY, WITH URETERAL STENT INSERTION;  Surgeon: Damari Barber Jr., MD;  Location: [facility] OR;  Service: Urology;  Laterality: Right;    CYSTOSCOPY W/ URETERAL STENT REMOVAL Right 4/20/2021    Procedure: CYSTOSCOPY, WITH URETERAL STENT REMOVAL;  Surgeon: Daamri Barber Jr., MD;  Location: [facility] OR;  Service: Urology;  Laterality: Right;    CYSTOSCOPY WITH URETEROSCOPY, RETROGRADE PYELOGRAPHY, AND INSERTION OF STENT Right 3/30/2021    Procedure: CYSTOSCOPY, WITH RETROGRADE PYELOGRAM AND URETERAL STENT INSERTION;  Surgeon: Damari Barber Jr., MD;  Location: [facility] OR;  Service: Urology;  Laterality: Right;    CYSTOURETEROSCOPY WITH RETROGRADE PYELOGRAPHY AND INSERTION OF STENT INTO URETER Right 1/18/2022    Procedure: CYSTOURETEROSCOPY, WITH RETROGRADE PYELOGRAM AND URETERAL STENT INSERTION;  Surgeon: Damari Barber Jr., MD;  Location: [facility] OR;  Service: Urology;  Laterality: Right;    DIAGNOSTIC LAPAROSCOPY N/A 2/13/2020    Procedure: LAPAROSCOPY, DIAGNOSTIC;  Surgeon: Robbi Lovell III, MD;  Location: [facility] OR;  Service: General;  Laterality: N/A;    HYSTERECTOMY  1980    ILEOSTOMY N/A 2/13/2020     Procedure: CREATION, ILEOSTOMY Loop;  Surgeon: Robbi Lovell III, MD;  Location: [facility] OR;  Service: General;  Laterality: N/A;    ILEOSTOMY CLOSURE N/A 5/18/2020    Procedure: CLOSURE, ILEOSTOMY;  Surgeon: Robbi Lovell III, MD;  Location: [facility] OR;  Service: General;  Laterality: N/A;    LASER LITHOTRIPSY Right 3/30/2021    Procedure: LITHOTRIPSY, USING LASER;  Surgeon: Damari Barber Jr., MD;  Location: [facility] OR;  Service: Urology;  Laterality: Right;    LUMBAR LAMINECTOMY      LYSIS OF ADHESIONS N/A 2/13/2020    Procedure: LYSIS, ADHESIONS;  Surgeon: Robbi Lovell III, MD;  Location: [facility] OR;  Service: General;  Laterality: N/A;    NECK SURGERY      PERCUTANEOUS TRANSLUMINAL ANGIOPLASTY OF ARTERIOVENOUS FISTULA N/A 1/19/2022    Procedure: PTA, AV FISTULA;  Surgeon: Raz Maher MD;  Location: [facility] CATH/EP LAB;  Service: Cardiothoracic;  Laterality: N/A;    PHLEBOGRAPHY  2/28/2020    Procedure: VENOGRAM;  Surgeon: Robbi Lovell III, MD;  Location: [facility] OR;  Service: General;;    REMOVAL OF VASCULAR ACCESS PORT Right 2/13/2020    Procedure: REMOVAL, VASCULAR ACCESS PORT;  Surgeon: Robbi Lovell III, MD;  Location: [facility];  Service: General;  Laterality: Right;    RETROGRADE PYELOGRAPHY  3/16/2021    Procedure: PYELOGRAM, RETROGRADE;  Surgeon: Damari Barber Jr., MD;  Location: [facility] OR;  Service: Urology;;    RETROGRADE PYELOGRAPHY Right 5/18/2021    Procedure: PYELOGRAM, RETROGRADE;  Surgeon: Damari Barber Jr., MD;  Location: [facility] OR;  Service: Urology;  Laterality: Right;    SHOULDER SURGERY      URETEROSCOPIC REMOVAL OF URETERIC CALCULUS Right 3/30/2021    Procedure: REMOVAL, CALCULUS, URETER, URETEROSCOPIC;  Surgeon: Damari Barber Jr., MD;  Location: [facility] OR;  Service: Urology;  Laterality: Right;    URETEROSCOPIC REMOVAL OF URETERIC CALCULUS Right 4/20/2021    Procedure: REMOVAL, CALCULUS, URETER, URETEROSCOPIC;  Surgeon: Damari Barber Jr., MD;  Location: [facility];   Service: Urology;  Laterality: Right;    vulva cancer       Family History   Problem Relation Age of Onset    Heart disease Mother     Hypertension Mother     Cancer Mother         lung    Heart disease Father     Heart disease Sister         Open heart surgery    No Known Problems Daughter      Social History     Tobacco Use    Smoking status: Former Smoker     Packs/day: 1.00     Years: 33.00     Pack years: 33.00     Types: Cigarettes     Quit date: 2000     Years since quittin.4    Smokeless tobacco: Former User     Quit date:    Substance Use Topics    Alcohol use: No    Drug use: No     Review of Systems   Constitutional: Positive for fatigue. Negative for chills and fever.   HENT: Negative for congestion.    Gastrointestinal: Positive for nausea.   Genitourinary: Positive for flank pain.       Physical Exam     Initial Vitals [22 0908]   BP Pulse Resp Temp SpO2   (!) 176/74 70 18 98.8 °F (37.1 °C) 98 %      MAP       --         Physical Exam    Constitutional: She appears well-developed and well-nourished. No distress.   HENT:   Head: Normocephalic and atraumatic.   Right Ear: External ear normal.   Left Ear: External ear normal.   Mouth/Throat: Oropharynx is clear and moist.   Eyes: Conjunctivae and EOM are normal. Pupils are equal, round, and reactive to light.   Neck: Neck supple.   Normal range of motion.  Cardiovascular: Normal rate, regular rhythm, normal heart sounds and intact distal pulses.   Pulmonary/Chest: Breath sounds normal. No respiratory distress.   Abdominal: Abdomen is soft. Bowel sounds are normal. There is abdominal tenderness.   Genitourinary:    Genitourinary Comments: CVA tenderness the right     Musculoskeletal:         General: No edema. Normal range of motion.      Cervical back: Normal range of motion and neck supple.     Neurological: She is alert and oriented to person, place, and time. GCS score is 15. GCS eye subscore is 4. GCS verbal subscore is  5. GCS motor subscore is 6.   Skin: Skin is warm and dry. Capillary refill takes less than 2 seconds. No rash noted.   Psychiatric: She has a normal mood and affect. Her behavior is normal.         ED Course   Procedures  Labs Reviewed   CBC W/ AUTO DIFFERENTIAL - Abnormal; Notable for the following components:       Result Value    WBC 12.77 (*)     RBC 3.23 (*)     Hemoglobin 11.5 (*)     Hematocrit 36.3 (*)      (*)     MCH 35.6 (*)     MCHC 31.7 (*)     Immature Granulocytes 1.3 (*)     Gran # (ANC) 11.5 (*)     Immature Grans (Abs) 0.16 (*)     Lymph # 0.4 (*)     Gran % 89.9 (*)     Lymph % 3.4 (*)     All other components within normal limits   COMPREHENSIVE METABOLIC PANEL - Abnormal; Notable for the following components:    Potassium 3.3 (*)     Glucose 128 (*)     BUN 28 (*)     Creatinine 4.3 (*)     Calcium 8.6 (*)     Total Bilirubin 1.2 (*)     eGFR if  11.2 (*)     eGFR if non  9.7 (*)     All other components within normal limits   URINALYSIS, REFLEX TO URINE CULTURE - Abnormal; Notable for the following components:    Appearance, UA Cloudy (*)     Protein, UA 2+ (*)     Occult Blood UA 2+ (*)     Leukocytes, UA 3+ (*)     All other components within normal limits    Narrative:     Specimen Source->Urine   URINALYSIS MICROSCOPIC - Abnormal; Notable for the following components:    RBC, UA 22 (*)     WBC, UA >100 (*)     WBC Clumps, UA Few (*)     Bacteria Many (*)     Hyaline Casts, UA 10 (*)     All other components within normal limits    Narrative:     Specimen Source->Urine   CULTURE, URINE          Imaging Results          CT Renal Stone Study ABD Pelvis WO (Final result)  Result time 06/21/22 10:25:50    Final result by Elias Sherwood MD (06/21/22 10:25:50)                 Narrative:    CMS MANDATED QUALITY DATA - CT RADIATION - 436    All CT scans at this facility utilize dose modulation, iterative reconstruction, and/or weight based dosing when  appropriate to reduce radiation dose to as low as reasonably achievable.        Reason: Flank pain, kidney stone suspected    TECHNIQUE: CT abdomen and pelvis without IV contrast.    COMPARISON: CT abdomen and pelvis April 28, 2022.    FINDINGS:    There is subsegmental atelectasis of the lung bases. Heart size is mildly enlarged.    Liver is normal size. No hepatic lesions identified. There is mild prominence of the intrahepatic bile ducts status post cholecystectomy. Common bile duct is prominent, felt to reflect postsurgical dilatation. The pancreas, spleen and adrenal glands are unremarkable.    There is moderate right hydronephrosis with right double-J ureteral stent. Nonobstructing renal calculi at the bilateral kidneys again noted, similar to previous exam. There are gas bubbles in the right renal pelvis and bladder. Right perinephric fat stranding noted. The left ureter is normal caliber without obstructions. The uterus and adnexal structures are not identified. There is no free fluid in the pelvis. Pelvic phleboliths and surgical clips noted.    There is moderate stool content throughout the large bowel, possibly reflecting constipation. Postsurgical changes of the large and small bowel bowel noted. Small bowel is normal caliber. The stomach is grossly unremarkable. There is no bowel wall thickening or inflammatory changes.    The abdominal aorta is normal caliber with atherosclerosis. There is no intra-abdominal lymphadenopathy. No acute osseous abnormality.    IMPRESSION:    1.  Moderate right hydronephrosis with perinephric fat stranding and right double-J ureteral stent in place. There are a few gas bubbles in the right renal pelvis and bladder. This could reflect prior manipulation of the urinary tract without being able to exclude a gas-forming infectious process. Correlate with urinalysis.  2.  Bilateral nonobstructing renal calculi.  3.  Additional incidental observations as  described.    Electronically signed by:  Elias Aminjayson LAM  6/21/2022 10:25 AM CDT Workstation: [id number]                               Medications   HYDROmorphone injection 1 mg (1 mg Intravenous Given 6/21/22 1028)   ondansetron injection 4 mg (4 mg Intravenous Given 6/21/22 1027)   meropenem-0.9% sodium chloride 1 g/50 mL IVPB (1 g Intravenous New Bag 6/21/22 1243)     Medical Decision Making:   ED Management:  The patient does have evidence of pyelonephritis and ureteral obstruction with perinephric stranding and hydronephrosis which is concerning giving the extent ureteral stent I have ordered meropenem in the emergency department            Attending Attestation:         Attending Critical Care:   Critical Care Times:   Direct Patient Care (initial evaluation, reassessments, and time considering the case)................................................................11 minutes.   Additional History from reviewing old medical records or taking additional history from the family, EMS, PCP, etc.......................7 minutes.   Ordering, Reviewing, and Interpreting Diagnostic Studies...............................................................................................................8 minutes.   Documentation..................................................................................................................................................................................5 minutes.   Consultation with other Physicians. .................................................................................................................................................7 minutes.   ==============================================================  · Total Critical Care Time - exclusive of procedural time: 38 minutes.  ==============================================================                   Clinical Impression:   Final diagnoses:  [N12] Pyelonephritis (Primary)  [N20.1] Ureterolithiasis           ED Disposition Condition    Admit               Hung Landaverde MD  06/21/22 6169

## 2022-06-21 NOTE — HPI
"72 year old female with a history of ESRD on HD MWF, HTN, hypothyroidism, ESBL Klebsiella and chronic diarrhea, s/p 2/3 COVID vaccinations presented to ED complaining of 1 hour history of right sided flank pain: dull ache with sharp stabbing exacerbations waxing and waning by itself (6-9/10). Has known renal lithiasis; is s/p stent placement 01/2022. Was discharged this past week after negative chest pain work-up.     In ED: labs reviewed and noted below: minimal leukocytosis with minimal macrocytic anemia; hypokalemia (treated in ED) with end stage renal dysfunction; U/A: dirty and sent for cultures. CT Kidney stone reviewed "Moderate right hydronephrosis with perinephric fat stranding and right double-J ureteral stent in place. There are a few gas bubbles in the right renal pelvis and bladder. This could reflect prior manipulation of the urinary tract without being able to exclude a gas-forming infectious process. Correlate with urinalysis."  She has not had recent manipulation of her stent. Discussed with ED MD, who reported that her Urologist has been consulted, but he is currently in the OR.   "

## 2022-06-21 NOTE — H&P
"[facility] - Emergency Dept  Hospital Medicine  History & Physical    Patient Name: Shara Hutchins  MRN: [id number]  Patient Class: IP- Inpatient  Admission Date: 6/21/2022  Attending Physician: Ashwin Olson MD  Primary Care Provider: April Horton MD         Patient information was obtained from patient and ER records.     Subjective:     Principal Problem:<principal problem not specified>    Chief Complaint:   Chief Complaint   Patient presents with    Flank Pain     Right side flank pain onset one hour ago.  Pt on dialysis at [facility] on M,W,F        HPI: 72 year old female with a history of ESRD on HD MWF, HTN, hypothyroidism, ESBL Klebsiella and chronic diarrhea, s/p 2/3 COVID vaccinations presented to ED complaining of 1 hour history of right sided flank pain: dull ache with sharp stabbing exacerbations waxing and waning by itself (6-9/10). Has known renal lithiasis; is s/p stent placement 01/2022. Was discharged this past week after negative chest pain work-up.     In ED: labs reviewed and noted below: minimal leukocytosis with minimal macrocytic anemia; hypokalemia (treated in ED) with end stage renal dysfunction; U/A: dirty and sent for cultures. CT Kidney stone reviewed "Moderate right hydronephrosis with perinephric fat stranding and right double-J ureteral stent in place. There are a few gas bubbles in the right renal pelvis and bladder. This could reflect prior manipulation of the urinary tract without being able to exclude a gas-forming infectious process. Correlate with urinalysis."  She has not had recent manipulation of her stent. Discussed with ED MD, who reported that her Urologist has been consulted, but he is currently in the OR.       Past Medical History:   Diagnosis Date    Cellulitis of trunk     Perineum, numerous episodes    Chronic diarrhea 02/10/2020    Short-gut syndrome    Diabetes mellitus, type 2     pt has not had for years after losing weight    End stage renal " disease on dialysis 01/07/2018    Fatigue     Hypertension     Hypothyroidism 2/10/2020    Iron deficiency anemia due to chronic blood loss 1/7/2018    Pulmonary hypertension 3/20/2020    Vulvar cancer 1/7/2019       Past Surgical History:   Procedure Laterality Date    ABDOMINAL SURGERY      ANGIOGRAPHY OF ARTERIOVENOUS SHUNT N/A 1/19/2022    Procedure: Fistulogram with Possible Intervention;  Surgeon: Raz Maher MD;  Location: [facility] CATH/EP LAB;  Service: Cardiothoracic;  Laterality: N/A;    APPENDECTOMY      AV FISTULA PLACEMENT Left 2018    BACK SURGERY      BLADDER FULGURATION N/A 6/16/2020    Procedure: FULGURATION, BLADDER;  Surgeon: Damari Barber Jr., MD;  Location: [facility] OR;  Service: Urology;  Laterality: N/A;    carpel tunnel surgery once on each hand      CERVICAL FUSION      x 4    cervix repair      CHOLECYSTECTOMY  2000    COLECTOMY      CYSTOSCOPY W/ RETROGRADES  6/16/2020    Procedure: CYSTOSCOPY, WITH RETROGRADE PYELOGRAM;  Surgeon: Damari Barber Jr., MD;  Location: [facility] OR;  Service: Urology;;    CYSTOSCOPY W/ RETROGRADES Right 4/20/2021    Procedure: CYSTOSCOPY, WITH RETROGRADE PYELOGRAM;  Surgeon: Damari Barber Jr., MD;  Location: [facility] OR;  Service: Urology;  Laterality: Right;    CYSTOSCOPY W/ URETERAL STENT PLACEMENT Right 3/16/2021    Procedure: CYSTOSCOPY, WITH URETERAL STENT INSERTION;  Surgeon: Damari Barber Jr., MD;  Location: [facility] OR;  Service: Urology;  Laterality: Right;    CYSTOSCOPY W/ URETERAL STENT PLACEMENT Right 5/18/2021    Procedure: CYSTOSCOPY, WITH URETERAL STENT INSERTION;  Surgeon: Damari Barber Jr., MD;  Location: [facility] OR;  Service: Urology;  Laterality: Right;    CYSTOSCOPY W/ URETERAL STENT REMOVAL Right 4/20/2021    Procedure: CYSTOSCOPY, WITH URETERAL STENT REMOVAL;  Surgeon: Damari Barber Jr., MD;  Location: [facility] OR;  Service: Urology;  Laterality: Right;    CYSTOSCOPY WITH URETEROSCOPY, RETROGRADE PYELOGRAPHY, AND INSERTION OF STENT  Right 3/30/2021    Procedure: CYSTOSCOPY, WITH RETROGRADE PYELOGRAM AND URETERAL STENT INSERTION;  Surgeon: Damari Barber Jr., MD;  Location: [facility] OR;  Service: Urology;  Laterality: Right;    CYSTOURETEROSCOPY WITH RETROGRADE PYELOGRAPHY AND INSERTION OF STENT INTO URETER Right 1/18/2022    Procedure: CYSTOURETEROSCOPY, WITH RETROGRADE PYELOGRAM AND URETERAL STENT INSERTION;  Surgeon: Damari Barber Jr., MD;  Location: [facility] OR;  Service: Urology;  Laterality: Right;    DIAGNOSTIC LAPAROSCOPY N/A 2/13/2020    Procedure: LAPAROSCOPY, DIAGNOSTIC;  Surgeon: Robbi Lovell III, MD;  Location: [facility] OR;  Service: General;  Laterality: N/A;    HYSTERECTOMY  1980    ILEOSTOMY N/A 2/13/2020    Procedure: CREATION, ILEOSTOMY Loop;  Surgeon: Robbi Lovell III, MD;  Location: [facility] OR;  Service: General;  Laterality: N/A;    ILEOSTOMY CLOSURE N/A 5/18/2020    Procedure: CLOSURE, ILEOSTOMY;  Surgeon: Robbi Lovell III, MD;  Location: [facility] OR;  Service: General;  Laterality: N/A;    LASER LITHOTRIPSY Right 3/30/2021    Procedure: LITHOTRIPSY, USING LASER;  Surgeon: Damari Barber Jr., MD;  Location: [facility];  Service: Urology;  Laterality: Right;    LUMBAR LAMINECTOMY      LYSIS OF ADHESIONS N/A 2/13/2020    Procedure: LYSIS, ADHESIONS;  Surgeon: Robbi Lovell III, MD;  Location: [facility] OR;  Service: General;  Laterality: N/A;    NECK SURGERY      PERCUTANEOUS TRANSLUMINAL ANGIOPLASTY OF ARTERIOVENOUS FISTULA N/A 1/19/2022    Procedure: PTA, AV FISTULA;  Surgeon: Raz Maher MD;  Location: [facility] CATH/EP LAB;  Service: Cardiothoracic;  Laterality: N/A;    PHLEBOGRAPHY  2/28/2020    Procedure: VENOGRAM;  Surgeon: Robbi Lovell III, MD;  Location: [facility] OR;  Service: General;;    REMOVAL OF VASCULAR ACCESS PORT Right 2/13/2020    Procedure: REMOVAL, VASCULAR ACCESS PORT;  Surgeon: Robbi Lovell III, MD;  Location: [facility] OR;  Service: General;  Laterality: Right;    RETROGRADE PYELOGRAPHY   3/16/2021    Procedure: PYELOGRAM, RETROGRADE;  Surgeon: Damari Barber Jr., MD;  Location: [facility] OR;  Service: Urology;;    RETROGRADE PYELOGRAPHY Right 5/18/2021    Procedure: PYELOGRAM, RETROGRADE;  Surgeon: Damari Barber Jr., MD;  Location: [facility] OR;  Service: Urology;  Laterality: Right;    SHOULDER SURGERY      URETEROSCOPIC REMOVAL OF URETERIC CALCULUS Right 3/30/2021    Procedure: REMOVAL, CALCULUS, URETER, URETEROSCOPIC;  Surgeon: Damari Barber Jr., MD;  Location: [facility] OR;  Service: Urology;  Laterality: Right;    URETEROSCOPIC REMOVAL OF URETERIC CALCULUS Right 4/20/2021    Procedure: REMOVAL, CALCULUS, URETER, URETEROSCOPIC;  Surgeon: Damari Barber Jr., MD;  Location: [facility] OR;  Service: Urology;  Laterality: Right;    vulva cancer         Review of patient's allergies indicates:   Allergen Reactions    Ciprofloxacin Other (See Comments)     Elevated liver enzymes      Pcn [penicillins] Anaphylaxis     TOLERATES ROCEPHIN WITHOUT DIFFICULTY       Current Facility-Administered Medications on File Prior to Encounter   Medication    electrolyte-S (ISOLYTE)    lactated ringers infusion    LIDOcaine (PF) 10 mg/ml (1%) injection 5 mg     Current Outpatient Medications on File Prior to Encounter   Medication Sig    albuterol-ipratropium (DUO-NEB) 2.5 mg-0.5 mg/3 mL nebulizer solution Take 3 mLs by nebulization every 6 (six) hours as needed for Wheezing or Shortness of Breath. Rescue    diltiaZEM (CARDIZEM CD) 120 MG Cp24 Take 1 capsule (120 mg total) by mouth once daily.    ferric citrate (AURYXIA) 210 mg iron Tab Take 2 tablets by mouth 3 (three) times daily with meals.    gabapentin (NEURONTIN) 100 MG capsule Take 100 mg by mouth 2 (two) times daily. HCS    hydrALAZINE (APRESOLINE) 50 MG tablet Take 1 tablet (50 mg total) by mouth every 8 (eight) hours.    levothyroxine (SYNTHROID) 88 MCG tablet TAKE 1 TABLET BEFORE BREAKFAST (Patient taking differently: Take 88 mcg by mouth before breakfast.  TAKE 1 TABLET BEFORE BREAKFAST)    LORazepam (ATIVAN) 0.5 MG tablet Take 1 tablet (0.5 mg total) by mouth every evening.    predniSONE (DELTASONE) 10 MG tablet Take 3 tablets (30 mg total) by mouth once daily. for 4 days    promethazine (PHENERGAN) 25 MG tablet TAKE 1 TABLET(25 MG) BY MOUTH TWICE DAILY AS NEEDED FOR NAUSEA (Patient taking differently: Take 25 mg by mouth 2 (two) times daily as needed for Nausea.)    rOPINIRole (REQUIP) 0.5 MG tablet Take 0.5 mg by mouth every 8 (eight) hours.    sertraline (ZOLOFT) 50 MG tablet Take 1 tablet (50 mg total) by mouth every evening. Take 50 mg by mouth every evening.    traMADoL (ULTRAM) 50 mg tablet TAKE ONE TABLET (50mg total) BY MOUTH EVERY 6 HOURS AS NEEDED FOR PAIN (Patient taking differently: Take 50 mg by mouth every 6 (six) hours as needed.)    cyanocobalamin 1,000 mcg/mL injection INJECT 1ML ONCE DAILY FOR 5 DAYS THEN CONTINUE ON 1ML ONCE WEEKLY FOR 4 WEEKS FOR 10 DOSES TOTAL (Patient taking differently: Inject 1,000 mcg into the skin once a week.)    cyclobenzaprine (FLEXERIL) 10 MG tablet TAKE ONE TABLET BY MOUTH THREE TIMES DAILY AS NEEDED FOR MUSCLE SPASMS (Patient taking differently: Take 10 mg by mouth 3 (three) times daily as needed.)    diphenhydrAMINE (BENADRYL) 25 mg capsule Take 25 mg by mouth every 12 (twelve) hours as needed for Itching.    hydrOXYzine HCL (ATARAX) 10 MG Tab Take 1 tablet (10 mg total) by mouth 3 (three) times daily as needed.    LIDOcaine HCL 2% (XYLOCAINE) 2 % jelly Apply topically as needed. (Patient taking differently: Apply 2 mLs topically as needed.)    rOPINIRole (REQUIP) 0.25 MG tablet Take 0.25 mg by mouth 3 (three) times daily. 1 tablet in the morning and 2 tablets at night    [DISCONTINUED] miconazole (MICOTIN) 2 % cream Apply topically 2 (two) times daily.     Family History       Problem Relation (Age of Onset)    Cancer Mother    Heart disease Mother, Father, Sister    Hypertension Mother    No Known  Problems Daughter          Tobacco Use    Smoking status: Former Smoker     Packs/day: 1.00     Years: 33.00     Pack years: 33.00     Types: Cigarettes     Quit date: 2000     Years since quittin.4    Smokeless tobacco: Former User     Quit date:    Substance and Sexual Activity    Alcohol use: No    Drug use: No    Sexual activity: Not on file     Review of Systems   Constitutional:  Positive for fatigue.   HENT: Negative.     Eyes: Negative.    Respiratory: Negative.     Cardiovascular: Negative.    Gastrointestinal: Negative.    Endocrine: Negative.    Genitourinary:  Positive for flank pain.   Skin: Negative.    Allergic/Immunologic: Negative.    Neurological: Negative.    Hematological: Negative.    All other systems reviewed and are negative.  Objective:     Vital Signs (Most Recent):  Temp: 98.8 °F (37.1 °C) (22 0908)  Pulse: 65 (22 1128)  Resp: 18 (22 1028)  BP: 127/60 (22 1128)  SpO2: 95 % (22 1128) Vital Signs (24h Range):  Temp:  [98.8 °F (37.1 °C)] 98.8 °F (37.1 °C)  Pulse:  [65-78] 65  Resp:  [18] 18  SpO2:  [95 %-99 %] 95 %  BP: (127-176)/(60-77) 127/60     Weight: 66.7 kg (147 lb)  Body mass index is 23.02 kg/m².    Physical Exam  Vitals and nursing note reviewed.   Constitutional:       Appearance: She is well-developed.   HENT:      Head: Normocephalic and atraumatic.      Right Ear: External ear normal.      Left Ear: External ear normal.      Nose: Nose normal.   Eyes:      Conjunctiva/sclera: Conjunctivae normal.      Pupils: Pupils are equal, round, and reactive to light.   Cardiovascular:      Rate and Rhythm: Normal rate and regular rhythm.      Heart sounds: Normal heart sounds.   Pulmonary:      Effort: Pulmonary effort is normal.      Breath sounds: Normal breath sounds.   Abdominal:      General: Bowel sounds are normal.      Palpations: Abdomen is soft.   Genitourinary:     Comments: Right flank pain to percussion  Musculoskeletal:          General: Normal range of motion.      Cervical back: Normal range of motion and neck supple.   Skin:     General: Skin is warm and dry.      Capillary Refill: Capillary refill takes less than 2 seconds.      Comments: Ecchymoses various stages arms   Neurological:      Mental Status: She is alert and oriented to person, place, and time.   Psychiatric:         Behavior: Behavior normal.         Thought Content: Thought content normal.         Judgment: Judgment normal.         CRANIAL NERVES     CN III, IV, VI   Pupils are equal, round, and reactive to light.     Significant Labs: All pertinent labs within the past 24 hours have been reviewed.  CBC:   Recent Labs   Lab 06/21/22  0955   WBC 12.77*   HGB 11.5*   HCT 36.3*        CMP:   Recent Labs   Lab 06/21/22  0955      K 3.3*      CO2 25   *   BUN 28*   CREATININE 4.3*   CALCIUM 8.6*   PROT 6.5   ALBUMIN 3.5   BILITOT 1.2*   ALKPHOS 103   AST 17   ALT 14   ANIONGAP 9   EGFRNONAA 9.7*     Cardiac Markers: No results for input(s): CKMB, MYOGLOBIN, BNP, TROPISTAT in the last 48 hours.    Significant Imaging: I have reviewed all pertinent imaging results/findings within the past 24 hours.    Assessment/Plan:     Nephrolithiasis  Urology opinion      ESRD on dialysis MWF  Nephrology for RRT      Right-sided pyelonephritis  ID; broad spectrum ABx refining as species identified        VTE Risk Mitigation (From admission, onward)    None             Ashwin Olson MD  Department of Hospital Medicine   [facility] - Emergency Dept

## 2022-06-22 NOTE — PROGRESS NOTES
Consent and Hep B status verified, removed 2000 uf net, no issues with catheter, post thrill and bruit noted, patient stable throughout treatment. Report given to Yana     06/22/22 1305   Handoff Report   Received From Adrienne   Given To Yana   Vital Signs   Temp 98.2 °F (36.8 °C)   Temp src Oral   Pulse 67   Heart Rate Source Monitor   Resp 18   SpO2 98 %   Pulse Oximetry Type Intermittent   O2 Device (Oxygen Therapy) room air   BP (!) 128/53   BP Location Right arm   BP Method Automatic   Patient Position Lying   Assessments (Pre/Post)   Consent Obtained yes   Safety vein preservation armband present   Date Hepatitis Profile Obtained 01/17/22   Blood Liters Processed (BLP) 67.4   Transport Modality bed   Level of Consciousness (AVPU) alert   Dialyzer Clearance mildly streaked   Pain   Preferred Pain Scale number (Numeric Rating Pain Scale)   Comfort/Acceptable Pain Level 0   Pain Rating (0-10): Rest 0   Pain Rating (0-10): Activity 0   Pain Management Interventions quiet environment facilitated   Pain/Comfort Interventions   Fever Reduction/Comfort Measures lightweight bedding;lightweight clothing   Pre-Hemodialysis Assessment   Additional Dialysis Information Needed Yes   Patient Status Departed   Treatment Consent Verified Yes   Treatment Status Completed        Hemodialysis AV Fistula Left upper arm   No Placement Date or Time found.   Present Prior to Hospital Arrival?: Yes  Location: Left upper arm   Site Assessment Clean;Dry;Intact   Patency Present;Thrill;Bruit   Status Deaccessed   Dressing Intervention First dressing   Dressing Status Clean;Dry;Intact   Site Condition No complications   Dressing Gauze   Post-Hemodialysis Assessment   Rinseback Volume (mL) 250 mL   Blood Volume Processed (Liters) 67.4 L   Dialyzer Clearance Lightly streaked   Duration of Treatment 180 minutes   Additional Fluid Intake (mL) 500 mL   Total UF (mL) 2500 mL   Net Fluid Removal 2000   Patient Response to Treatment federica    Post-Treatment Weight 64.7 kg (142 lb 10.2 oz)   Treatment Weight Change -2   Arterial bleeding stop time (min) 5 min   Venous bleeding stop time (min) 5 min   Post-Hemodialysis Comments stable

## 2022-06-22 NOTE — CARE UPDATE
06/22/22 1418   Patient Assessment/Suction   Level of Consciousness (AVPU) alert   Respiratory Effort Normal;Unlabored   Expansion/Accessory Muscles/Retractions no use of accessory muscles   All Lung Fields Breath Sounds clear   Rhythm/Pattern, Respiratory no shortness of breath reported   PRE-TX-O2   O2 Device (Oxygen Therapy) room air   SpO2 96 %   Pulse Oximetry Type Intermittent   $ Pulse Oximetry - Multiple Charge Pulse Oximetry - Multiple   Pulse 72  (Simultaneous filing. User may not have seen previous data.)   Resp 17  (Simultaneous filing. User may not have seen previous data.)   BP (!) 148/73   Positioning HOB elevated 45 degrees   Aerosol Therapy   $ Aerosol Therapy Charges PRN treatment not required   Education   $ Education Other (see comment);15 min  (SATS)   Respiratory Evaluation   $ Care Plan Tech Time 15 min   $ Eval/Re-eval Charges Evaluation

## 2022-06-22 NOTE — TRANSFER OF CARE
"Anesthesia Transfer of Care Note    Patient: Shara Hutchins    Procedure(s) Performed: Procedure(s) (LRB):  CYSTOSCOPY, WITH URETERAL STENT INSERTION (Right)    Patient location: PACU    Anesthesia Type: general    Transport from OR: Transported from OR on room air with adequate spontaneous ventilation    Post pain: adequate analgesia    Post assessment: no apparent anesthetic complications    Post vital signs: stable    Level of consciousness: awake, alert and oriented    Nausea/Vomiting: no nausea/vomiting    Complications: none    Transfer of care protocol was followed      Last vitals:   Visit Vitals  BP (!) 148/73 (BP Location: Right arm, Patient Position: Lying)   Pulse 72   Temp 36.8 °C (98.2 °F) (Oral)   Resp 18   Ht 5' 7" (1.702 m)   Wt 66.7 kg (147 lb 0.8 oz)   SpO2 96%   BMI 23.03 kg/m²     "

## 2022-06-22 NOTE — OP NOTE
[facility]  Operative Note    Date: 06/22/2022    Pre-Op Diagnosis: Right hydroureteronephrosis, nephrolithiasis, history of ureteral stricture    Post-Op Diagnosis: Same    Procedure(s) Performed:   1. Cystourethroscopy  2. Right retrograde pyelogram  3. Right ureteral stent exchange, 7 x 24 JJ stent      Fluoro < 1 hour    Specimen(s): None    Staff Surgeon: Damari Barber MD    Assistant Surgeon: Gaby Garcia MD    Anesthesia: General    Indications: Shara Hutchins is a 72 y.o. 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     Findings: significant radiation changes in vagina causing induration/erythema of the external vagina and groin. Urine was VERY cloudy from the bladder. Proximal coil of stent would not uncurl. Stent encrusted on the inside. Only 40cc drained from R kidney, cloudy. Small stones came out with stent. Moderate hydro in kidney on retrograde.     Estimated Blood Loss: Minimal    Drains: 7 x 24 cm J stent    Procedure in Detail:  After risks, benefits and possible complications of cystoscopy were explained, the patient elected to undergo the procedure and informed consent was obtained. All questions were answered in the marek-operative area. The patient was transferred to the cystoscopy suite and placed in the supine position.  SCDs were applied and working.  General anesthesia was administered.  Once the patient was adequately sedated, she was placed in the dorsal lithotomy position and prepped and draped in the usual sterile fashion.  Time out was performed, marek-procedural antibiotics were confirmed.     A rigid cystoscope in a 22 Fr sheath was introduced into the bladder per urethra. This passed easily.  The entire urethra was visualized which showed  no masses or strictures.  The right and left ureteral orifices were identified in the normal anatomic position. Urine very cloudy. Bladder drained and irrigated.  Formal cystoscopy was performed which revealed no masses or lesions suspicious for malignancy, no trabeculations, no bladder stones and no bladder diverticuli. Findings as above.     Grasper used to grasp the right stent and bring out urethral meatus. Sensor tip wire was advanced up the stent. Would not advance easily bc encrusted. Changed for glidewire. Would not go, changed for stiff end of wire. Advanced but could not uncurl proximal end of stent. Removed scope leaving stent in with wire in it.     Reintroduced cystoscope and advanced sensor tip wire up right side. Would not go. Placed 5fr open ended over the wire. Used this to buttress wire then advanced wire up to the renal pelvis. Then advanced 5fr open ended over wire to kidney. Removed wire. Aspirated 40cc. Cloudy clear urine. Sent for ua and culture. Then advanced 7x24 stent over wire. Went easily.      Once in the appropriate position the wire was removed and there was a proximal and distal curl confirmed on fluoroscopy. The bladder was drained, and the patient was removed from lithotomy.     16fr latex lee placed with 10cc in balloon.      The patient tolerated the procedure well and was transferred to recovery in stable condition.    Disposition:    What  did:  Exchanged a right ureteral stent to drain infection due to obstructed indwelling stent.     Short term:  · Transfer to medicine inpatient service for uti  · Continue lee catheter until afebrile x 24 hours and wbc decreased  · Follow up urine culture from right kidney.   · Ok to discharge home without Lee as long as patient remains afebrile without catheter in  · Recommend antibiotics for 10-14 days total depending on final cultures  · Follow up with her urologist Dr.Tonye Barber. consider ureteroscopy and another  retrograde to determine if stent can come out after uti adequately treated (now that stone burden much less on right). Still has stones on left. Would not wait 5 months to exchange stent next time. Stent encrusted. Consider starting on citrate supplementation to prevent encrustation.                       Gbay Garcia MD

## 2022-06-22 NOTE — CONSULTS
"INPATIENT NEPHROLOGY CONSULT   [facility]    Patient Name: Shara Hutchins  Date: 06/22/2022    Reason for consultation: ESRD    Chief Complaint:   Chief Complaint   Patient presents with    Flank Pain     Right side flank pain onset one hour ago.  Pt on dialysis at [facility] on M,W,F       History of Present Illness:  72 year old female with a history of ESRD on HD MWF, HTN, hypothyroidism, ESBL Klebsiella and chronic diarrhea, s/p 2/3 COVID vaccinations presented to ED complaining of 1 hour history of right sided flank pain: dull ache with sharp stabbing exacerbations waxing and waning by itself (6-9/10). Has known renal lithiasis; is s/p stent placement 01/2022. Was discharged this past week after negative chest pain work-up. In ED: U/A: dirty and sent for cultures. CT Kidney stone reviewed "Moderate right hydronephrosis with perinephric fat stranding and right double-J ureteral stent in place. There are a few gas bubbles in the right renal pelvis and bladder. This could reflect prior manipulation of the urinary tract without being able to exclude a gas-forming infectious process."  Urology has been consulted- will go to OR later today. Consulted for dialysis.    Interval History:  6/22- VSS, waiting urologic procedure    Plan of Care:    Assessment:  R obstructive uropathy with acute pyelonephritis  ESRD on HD MWF  HTN  Hypokalemia  SHPT  Anemia of CKD    Plan:    - urology has been consulted  - ordered HD MWF  - ordered UF to dry weight  - resume home BP meds  - will adjust dialysate for metabolic derangements  - resume binder  - ordered ADAM with HD    Thank you for allowing us to participate in this patient's care. We will continue to follow.    Vital Signs:  Temp Readings from Last 3 Encounters:   06/22/22 98 °F (36.7 °C) (Oral)   06/18/22 97.5 °F (36.4 °C) (Oral)   04/30/22 97.5 °F (36.4 °C) (Oral)       Pulse Readings from Last 3 Encounters:   06/22/22 66   06/18/22 96   05/10/22 84       BP " Readings from Last 3 Encounters:   06/22/22 119/62   06/18/22 (!) 145/77   05/10/22 128/60       Weight:  Wt Readings from Last 3 Encounters:   06/21/22 66.7 kg (147 lb 0.8 oz)   06/18/22 65.5 kg (144 lb 8.2 oz)   06/17/22 66.7 kg (147 lb)       Past Medical & Surgical History:  Past Medical History:   Diagnosis Date    Cellulitis of trunk     Perineum, numerous episodes    Chronic diarrhea 02/10/2020    Short-gut syndrome    Diabetes mellitus, type 2     pt has not had for years after losing weight    End stage renal disease on dialysis 01/07/2018    Fatigue     Hypertension     Hypothyroidism 2/10/2020    Iron deficiency anemia due to chronic blood loss 1/7/2018    Pulmonary hypertension 3/20/2020    Vulvar cancer 1/7/2019       Past Surgical History:   Procedure Laterality Date    ABDOMINAL SURGERY      ANGIOGRAPHY OF ARTERIOVENOUS SHUNT N/A 1/19/2022    Procedure: Fistulogram with Possible Intervention;  Surgeon: Raz Maher MD;  Location: [facility] CATH/EP LAB;  Service: Cardiothoracic;  Laterality: N/A;    APPENDECTOMY      AV FISTULA PLACEMENT Left 2018    BACK SURGERY      BLADDER FULGURATION N/A 6/16/2020    Procedure: FULGURATION, BLADDER;  Surgeon: Damari Barber Jr., MD;  Location: [facility];  Service: Urology;  Laterality: N/A;    carpel tunnel surgery once on each hand      CERVICAL FUSION      x 4    cervix repair      CHOLECYSTECTOMY  2000    COLECTOMY      CYSTOSCOPY W/ RETROGRADES  6/16/2020    Procedure: CYSTOSCOPY, WITH RETROGRADE PYELOGRAM;  Surgeon: Damari Barber Jr., MD;  Location: [facility] OR;  Service: Urology;;    CYSTOSCOPY W/ RETROGRADES Right 4/20/2021    Procedure: CYSTOSCOPY, WITH RETROGRADE PYELOGRAM;  Surgeon: Damari Barber Jr., MD;  Location: [facility] OR;  Service: Urology;  Laterality: Right;    CYSTOSCOPY W/ URETERAL STENT PLACEMENT Right 3/16/2021    Procedure: CYSTOSCOPY, WITH URETERAL STENT INSERTION;  Surgeon: Damari Barber Jr., MD;  Location: [facility];   Service: Urology;  Laterality: Right;    CYSTOSCOPY W/ URETERAL STENT PLACEMENT Right 5/18/2021    Procedure: CYSTOSCOPY, WITH URETERAL STENT INSERTION;  Surgeon: Damari Barber Jr., MD;  Location: [facility] OR;  Service: Urology;  Laterality: Right;    CYSTOSCOPY W/ URETERAL STENT REMOVAL Right 4/20/2021    Procedure: CYSTOSCOPY, WITH URETERAL STENT REMOVAL;  Surgeon: Damari Barber Jr., MD;  Location: [facility] OR;  Service: Urology;  Laterality: Right;    CYSTOSCOPY WITH URETEROSCOPY, RETROGRADE PYELOGRAPHY, AND INSERTION OF STENT Right 3/30/2021    Procedure: CYSTOSCOPY, WITH RETROGRADE PYELOGRAM AND URETERAL STENT INSERTION;  Surgeon: Damari Barber Jr., MD;  Location: [facility] OR;  Service: Urology;  Laterality: Right;    CYSTOURETEROSCOPY WITH RETROGRADE PYELOGRAPHY AND INSERTION OF STENT INTO URETER Right 1/18/2022    Procedure: CYSTOURETEROSCOPY, WITH RETROGRADE PYELOGRAM AND URETERAL STENT INSERTION;  Surgeon: Damari Barber Jr., MD;  Location: [facility] OR;  Service: Urology;  Laterality: Right;    DIAGNOSTIC LAPAROSCOPY N/A 2/13/2020    Procedure: LAPAROSCOPY, DIAGNOSTIC;  Surgeon: Robbi Lovell III, MD;  Location: [facility] OR;  Service: General;  Laterality: N/A;    HYSTERECTOMY  1980    ILEOSTOMY N/A 2/13/2020    Procedure: CREATION, ILEOSTOMY Loop;  Surgeon: Robbi Lovell III, MD;  Location: [facility] OR;  Service: General;  Laterality: N/A;    ILEOSTOMY CLOSURE N/A 5/18/2020    Procedure: CLOSURE, ILEOSTOMY;  Surgeon: Robbi Lovell III, MD;  Location: [facility] OR;  Service: General;  Laterality: N/A;    LASER LITHOTRIPSY Right 3/30/2021    Procedure: LITHOTRIPSY, USING LASER;  Surgeon: Damari Barber Jr., MD;  Location: [facility] OR;  Service: Urology;  Laterality: Right;    LUMBAR LAMINECTOMY      LYSIS OF ADHESIONS N/A 2/13/2020    Procedure: LYSIS, ADHESIONS;  Surgeon: Robbi Lovell III, MD;  Location: [facility] OR;  Service: General;  Laterality: N/A;    NECK SURGERY      PERCUTANEOUS TRANSLUMINAL  ANGIOPLASTY OF ARTERIOVENOUS FISTULA N/A 2022    Procedure: PTA, AV FISTULA;  Surgeon: Raz Maher MD;  Location: [facility] CATH/EP LAB;  Service: Cardiothoracic;  Laterality: N/A;    PHLEBOGRAPHY  2020    Procedure: VENOGRAM;  Surgeon: Robbi Lovell III, MD;  Location: [facility] OR;  Service: General;;    REMOVAL OF VASCULAR ACCESS PORT Right 2020    Procedure: REMOVAL, VASCULAR ACCESS PORT;  Surgeon: Robbi Lovell III, MD;  Location: [facility] OR;  Service: General;  Laterality: Right;    RETROGRADE PYELOGRAPHY  3/16/2021    Procedure: PYELOGRAM, RETROGRADE;  Surgeon: Damari Barber Jr., MD;  Location: [facility] OR;  Service: Urology;;    RETROGRADE PYELOGRAPHY Right 2021    Procedure: PYELOGRAM, RETROGRADE;  Surgeon: Damari Barber Jr., MD;  Location: [facility];  Service: Urology;  Laterality: Right;    SHOULDER SURGERY      URETEROSCOPIC REMOVAL OF URETERIC CALCULUS Right 3/30/2021    Procedure: REMOVAL, CALCULUS, URETER, URETEROSCOPIC;  Surgeon: Damari Barber Jr., MD;  Location: [facility] OR;  Service: Urology;  Laterality: Right;    URETEROSCOPIC REMOVAL OF URETERIC CALCULUS Right 2021    Procedure: REMOVAL, CALCULUS, URETER, URETEROSCOPIC;  Surgeon: Damari Barber Jr., MD;  Location: [facility];  Service: Urology;  Laterality: Right;    vulva cancer         Past Social History:  Social History     Socioeconomic History    Marital status:    Tobacco Use    Smoking status: Former Smoker     Packs/day: 1.00     Years: 33.00     Pack years: 33.00     Types: Cigarettes     Quit date: 2000     Years since quittin.4    Smokeless tobacco: Former User     Quit date:    Substance and Sexual Activity    Alcohol use: No    Drug use: No       Medications:  Scheduled Meds:   diltiaZEM  120 mg Oral Daily    gabapentin  100 mg Oral BID    heparin (porcine)  5,000 Units Subcutaneous Q8H    hydrALAZINE  50 mg Oral Q8H    levothyroxine  88 mcg Oral Before breakfast    LORazepam   0.5 mg Oral QHS    meropenem (MERREM) IVPB  500 mg Intravenous Q12H    mupirocin   Nasal BID    rOPINIRole  0.5 mg Oral Q8H    sertraline  50 mg Oral QHS    sevelamer carbonate  800 mg Oral TID WM     Continuous Infusions:  PRN Meds:.acetaminophen, albuterol-ipratropium, melatonin, morphine, morphine, ondansetron, promethazine, Pharmacy to dose Vancomycin consult **AND** vancomycin - pharmacy to dose  Current Facility-Administered Medications on File Prior to Encounter   Medication Dose Route Frequency Provider Last Rate Last Admin    electrolyte-S (ISOLYTE)   Intravenous Continuous Raz Ruff MD        lactated ringers infusion   Intravenous Continuous Ruy Beltre MD        LIDOcaine (PF) 10 mg/ml (1%) injection 5 mg  0.5 mL Intradermal Once Raz Ruff MD         Current Outpatient Medications on File Prior to Encounter   Medication Sig Dispense Refill    albuterol-ipratropium (DUO-NEB) 2.5 mg-0.5 mg/3 mL nebulizer solution Take 3 mLs by nebulization every 6 (six) hours as needed for Wheezing or Shortness of Breath. Rescue 75 mL 0    diltiaZEM (CARDIZEM CD) 120 MG Cp24 Take 1 capsule (120 mg total) by mouth once daily. 90 capsule 3    ferric citrate (AURYXIA) 210 mg iron Tab Take 2 tablets by mouth 3 (three) times daily with meals.      gabapentin (NEURONTIN) 100 MG capsule Take 100 mg by mouth 2 (two) times daily. HCS      hydrALAZINE (APRESOLINE) 50 MG tablet Take 1 tablet (50 mg total) by mouth every 8 (eight) hours. 270 tablet 0    levothyroxine (SYNTHROID) 88 MCG tablet TAKE 1 TABLET BEFORE BREAKFAST (Patient taking differently: Take 88 mcg by mouth before breakfast. TAKE 1 TABLET BEFORE BREAKFAST) 90 tablet 3    LORazepam (ATIVAN) 0.5 MG tablet Take 1 tablet (0.5 mg total) by mouth every evening. 90 tablet 1    predniSONE (DELTASONE) 10 MG tablet Take 3 tablets (30 mg total) by mouth once daily. for 4 days 12 tablet 0    promethazine (PHENERGAN) 25 MG tablet TAKE 1  TABLET(25 MG) BY MOUTH TWICE DAILY AS NEEDED FOR NAUSEA (Patient taking differently: Take 25 mg by mouth 2 (two) times daily as needed for Nausea.) 60 tablet 0    rOPINIRole (REQUIP) 0.5 MG tablet Take 0.5 mg by mouth every 8 (eight) hours.      sertraline (ZOLOFT) 50 MG tablet Take 1 tablet (50 mg total) by mouth every evening. Take 50 mg by mouth every evening. 90 tablet 1    traMADoL (ULTRAM) 50 mg tablet TAKE ONE TABLET (50mg total) BY MOUTH EVERY 6 HOURS AS NEEDED FOR PAIN (Patient taking differently: Take 50 mg by mouth every 6 (six) hours as needed.) 30 tablet 0    [DISCONTINUED] miconazole (MICOTIN) 2 % cream Apply topically 2 (two) times daily. 28 g 0       Allergies:  Ciprofloxacin and Pcn [penicillins]    Past Family History:  Reviewed; refer to Hospitalist Admission Note    Review of Systems:  Off floor    Physical Exam:  Off floor    Results:  Lab Results   Component Value Date     06/22/2022    K 3.6 06/22/2022     06/22/2022    CO2 25 06/22/2022    BUN 41 (H) 06/22/2022    CREATININE 5.1 (H) 06/22/2022    CALCIUM 8.3 (L) 06/22/2022    ANIONGAP 10 06/22/2022    ESTGFRAFRICA 9.1 (A) 06/22/2022    EGFRNONAA 7.9 (A) 06/22/2022       Lab Results   Component Value Date    CALCIUM 8.3 (L) 06/22/2022    PHOS 4.0 06/18/2022       Recent Labs   Lab 06/22/22  0606   WBC 9.01   RBC 2.99*   HGB 10.4*   HCT 32.6*      *   MCH 34.8*   MCHC 31.9*       I have personally reviewed pertinent radiological imaging and reports.    I have spent > 70 minutes providing care for this patient for the above diagnoses. These services have included chart/data/imaging review, evaluation, exam, formulation of plan, , note preparation, and discussions with staff involved in this patient's care.    Yasmin Parker MD MPH  [facility] [address] [address]  [phone] (p) [phone] (l)

## 2022-06-22 NOTE — ANESTHESIA PREPROCEDURE EVALUATION
06/22/2022  Shara Hutchins is a 72 y.o., female.  Patient Active Problem List   Diagnosis    Vitamin D deficiency    Hisotry of Vulvar cancer    Anemia of decreased vitamin B12 absorption    Anemia due to stage 5 chronic kidney disease treated with erythropoietin    Hypothyroidism    Anemia of chronic disease    Nausea & vomiting    Chronic diarrhea    S/P ileostomy    DARCI (acute kidney injury)    Severe dehydration due to chronic diarrhea/high output ileostomy    Electrolyte imbalance    Severe malnutrition    History of difficult venous access    Malnutrition    Acute hypoxemic respiratory failure    Status post reversal of ileostomy    Chronic diarrhea    Mild protein-energy malnutrition    Perineal rash, female    Perianal cellulitis    Pneumonia due to infectious organism    Right hydroureter secondary to obstructing calculi    Right-sided pyelonephritis    ESRD on dialysis MWF    Macrocytic anemia    Constipation    Bilateral LE neuropathy    Essential hypertension    Nephrolithiasis    Sepsis    Hypomagnesemia    Acute on chronic anemia    Vulvar cellulitis    Lymphedema due to radiation    Urinary incontinence    S/P ureteral stent placement    Thrombocytopenia    Secondary hyperparathyroidism of renal origin    Stenosis of other vascular prosthetic devices, implants and grafts, initial encounter    History of cancer of vulva    Vitamin B12 deficiency    Chest pain    Emphysema lung                                                                                                                  06/22/2022  Shara Hutchins is a 72 y.o., female   Patient Active Problem List   Diagnosis    Vitamin D deficiency    Hisotry of Vulvar cancer    Anemia of decreased vitamin B12 absorption    Anemia due to stage 5 chronic kidney disease treated with erythropoietin     Hypothyroidism    Anemia of chronic disease    Nausea & vomiting    Chronic diarrhea    S/P ileostomy    DARCI (acute kidney injury)    Severe dehydration due to chronic diarrhea/high output ileostomy    Electrolyte imbalance    Severe malnutrition    History of difficult venous access    Malnutrition    Acute hypoxemic respiratory failure    Status post reversal of ileostomy    Chronic diarrhea    Mild protein-energy malnutrition    Perineal rash, female    Perianal cellulitis    Pneumonia due to infectious organism    Right hydroureter secondary to obstructing calculi    Right-sided pyelonephritis    ESRD on dialysis MWF    Macrocytic anemia    Constipation    Bilateral LE neuropathy    Essential hypertension    Nephrolithiasis    Sepsis    Hypomagnesemia    Acute on chronic anemia    Vulvar cellulitis    Lymphedema due to radiation    Urinary incontinence    S/P ureteral stent placement    Thrombocytopenia    Secondary hyperparathyroidism of renal origin    Stenosis of other vascular prosthetic devices, implants and grafts, initial encounter    History of cancer of vulva    Vitamin B12 deficiency    Chest pain    Emphysema lung       Past Surgical History:   Procedure Laterality Date    ABDOMINAL SURGERY      ANGIOGRAPHY OF ARTERIOVENOUS SHUNT N/A 1/19/2022    Procedure: Fistulogram with Possible Intervention;  Surgeon: Raz Maher MD;  Location: [facility] CATH/EP LAB;  Service: Cardiothoracic;  Laterality: N/A;    APPENDECTOMY      AV FISTULA PLACEMENT Left 2018    BACK SURGERY      BLADDER FULGURATION N/A 6/16/2020    Procedure: FULGURATION, BLADDER;  Surgeon: Damari Barber Jr., MD;  Location: [facility] OR;  Service: Urology;  Laterality: N/A;    carpel tunnel surgery once on each hand      CERVICAL FUSION      x 4    cervix repair      CHOLECYSTECTOMY  2000    COLECTOMY      CYSTOSCOPY W/ RETROGRADES  6/16/2020    Procedure: CYSTOSCOPY, WITH RETROGRADE PYELOGRAM;   Surgeon: Damari Barber Jr., MD;  Location: [facility] OR;  Service: Urology;;    CYSTOSCOPY W/ RETROGRADES Right 4/20/2021    Procedure: CYSTOSCOPY, WITH RETROGRADE PYELOGRAM;  Surgeon: Damari Barber Jr., MD;  Location: [facility] OR;  Service: Urology;  Laterality: Right;    CYSTOSCOPY W/ URETERAL STENT PLACEMENT Right 3/16/2021    Procedure: CYSTOSCOPY, WITH URETERAL STENT INSERTION;  Surgeon: Damari Barber Jr., MD;  Location: [facility] OR;  Service: Urology;  Laterality: Right;    CYSTOSCOPY W/ URETERAL STENT PLACEMENT Right 5/18/2021    Procedure: CYSTOSCOPY, WITH URETERAL STENT INSERTION;  Surgeon: Damari Barber Jr., MD;  Location: [facility] OR;  Service: Urology;  Laterality: Right;    CYSTOSCOPY W/ URETERAL STENT REMOVAL Right 4/20/2021    Procedure: CYSTOSCOPY, WITH URETERAL STENT REMOVAL;  Surgeon: Damari Barber Jr., MD;  Location: [facility] OR;  Service: Urology;  Laterality: Right;    CYSTOSCOPY WITH URETEROSCOPY, RETROGRADE PYELOGRAPHY, AND INSERTION OF STENT Right 3/30/2021    Procedure: CYSTOSCOPY, WITH RETROGRADE PYELOGRAM AND URETERAL STENT INSERTION;  Surgeon: Damari Barber Jr., MD;  Location: [facility] OR;  Service: Urology;  Laterality: Right;    CYSTOURETEROSCOPY WITH RETROGRADE PYELOGRAPHY AND INSERTION OF STENT INTO URETER Right 1/18/2022    Procedure: CYSTOURETEROSCOPY, WITH RETROGRADE PYELOGRAM AND URETERAL STENT INSERTION;  Surgeon: Damari Barber Jr., MD;  Location: [facility] OR;  Service: Urology;  Laterality: Right;    DIAGNOSTIC LAPAROSCOPY N/A 2/13/2020    Procedure: LAPAROSCOPY, DIAGNOSTIC;  Surgeon: Robbi Lovell III, MD;  Location: [facility] OR;  Service: General;  Laterality: N/A;    HYSTERECTOMY  1980    ILEOSTOMY N/A 2/13/2020    Procedure: CREATION, ILEOSTOMY Loop;  Surgeon: Robbi Lovell III, MD;  Location: [facility] OR;  Service: General;  Laterality: N/A;    ILEOSTOMY CLOSURE N/A 5/18/2020    Procedure: CLOSURE, ILEOSTOMY;  Surgeon: Robbi Lovell III, MD;  Location: [facility] OR;  Service:  General;  Laterality: N/A;    LASER LITHOTRIPSY Right 3/30/2021    Procedure: LITHOTRIPSY, USING LASER;  Surgeon: Damari Barber Jr., MD;  Location: [facility] OR;  Service: Urology;  Laterality: Right;    LUMBAR LAMINECTOMY      LYSIS OF ADHESIONS N/A 2/13/2020    Procedure: LYSIS, ADHESIONS;  Surgeon: Robbi Lovell III, MD;  Location: [facility] OR;  Service: General;  Laterality: N/A;    NECK SURGERY      PERCUTANEOUS TRANSLUMINAL ANGIOPLASTY OF ARTERIOVENOUS FISTULA N/A 1/19/2022    Procedure: PTA, AV FISTULA;  Surgeon: Raz Maher MD;  Location: [facility] CATH/EP LAB;  Service: Cardiothoracic;  Laterality: N/A;    PHLEBOGRAPHY  2/28/2020    Procedure: VENOGRAM;  Surgeon: Robbi Lovell III, MD;  Location: [facility] OR;  Service: General;;    REMOVAL OF VASCULAR ACCESS PORT Right 2/13/2020    Procedure: REMOVAL, VASCULAR ACCESS PORT;  Surgeon: Robbi Lovell III, MD;  Location: [facility] OR;  Service: General;  Laterality: Right;    RETROGRADE PYELOGRAPHY  3/16/2021    Procedure: PYELOGRAM, RETROGRADE;  Surgeon: Damari Barber Jr., MD;  Location: [facility] OR;  Service: Urology;;    RETROGRADE PYELOGRAPHY Right 5/18/2021    Procedure: PYELOGRAM, RETROGRADE;  Surgeon: Damari Barber Jr., MD;  Location: [facility];  Service: Urology;  Laterality: Right;    SHOULDER SURGERY      URETEROSCOPIC REMOVAL OF URETERIC CALCULUS Right 3/30/2021    Procedure: REMOVAL, CALCULUS, URETER, URETEROSCOPIC;  Surgeon: Damari Barber Jr., MD;  Location: [facility];  Service: Urology;  Laterality: Right;    URETEROSCOPIC REMOVAL OF URETERIC CALCULUS Right 4/20/2021    Procedure: REMOVAL, CALCULUS, URETER, URETEROSCOPIC;  Surgeon: Damari Barber Jr., MD;  Location: [facility];  Service: Urology;  Laterality: Right;    vulva cancer          Tobacco Use:  The patient  reports that she quit smoking about 22 years ago. Her smoking use included cigarettes. She has a 33.00 pack-year smoking history. She quit smokeless tobacco use about 22 years ago.      Results for orders placed or performed during the hospital encounter of 06/16/22   EKG 12-lead    Collection Time: 06/16/22  2:42 PM    Narrative    Test Reason : R07.81,    Vent. Rate : 087 BPM     Atrial Rate : 087 BPM     P-R Int : 154 ms          QRS Dur : 080 ms      QT Int : 376 ms       P-R-T Axes : 077 061 055 degrees     QTc Int : 452 ms    Normal sinus rhythm  Possible Left atrial enlargement  Borderline Abnormal ECG  When compared with ECG of 25-JAN-2022 06:42,  Criteria for Inferior infarct are no longer Present  Confirmed by Grant Payne MD ([id number]) on 6/19/2022 9:14:01 PM    Referred By: [email or web address]   SELF           Confirmed By:Grant Payne MD             Lab Results   Component Value Date    WBC 9.01 06/22/2022    HGB 10.4 (L) 06/22/2022    HCT 32.6 (L) 06/22/2022     (H) 06/22/2022     06/22/2022     BMP  Lab Results   Component Value Date     06/22/2022    K 3.6 06/22/2022     06/22/2022    CO2 25 06/22/2022    BUN 41 (H) 06/22/2022    CREATININE 5.1 (H) 06/22/2022    CALCIUM 8.3 (L) 06/22/2022    ANIONGAP 10 06/22/2022    ESTGFRAFRICA 9.1 (A) 06/22/2022    EGFRNONAA 7.9 (A) 06/22/2022       3/2020 ECHO    · Mild concentric left ventricular hypertrophy.  · Pulmonary hypertension present.  · Normal left ventricular systolic function. The estimated ejection fraction is 60%.  · Grade I (mild) left ventricular diastolic dysfunction consistent with impaired relaxation.  · No wall motion abnormalities.  · Normal right ventricular systolic function.  · Intermediate central venous pressure (8 mmHg).  · Trivial pericardial effusion.  · There is a left pleural effusion.  · The estimated PA systolic pressure is 47 mmHg          Pre-op Assessment    I have reviewed the Patient Summary Reports.    I have reviewed the Nursing Notes. I have reviewed the NPO Status.   I have reviewed the Medications.     Review of Systems  Anesthesia Hx:  No problems with previous Anesthesia   Denies Family Hx of Anesthesia complications.   Denies Personal Hx of Anesthesia complications.   Social:  Non-Smoker, No Alcohol Use    Hematology/Oncology:         -- Anemia: Hematology Comments:   Iron deficiency anemia  --  Cancer in past history (Vulvar cancinoma): s/p surgery, s/p radiation therapy and s/p chemotherapy   chemotherapy, radiation and surgery  Oncology Comments: Vulva cancer     EENT/Dental:  EENT/Dental Normal Full upper and lower partial out.  Broken right lower molar   Cardiovascular:   Hypertension, well controlled ECG has been reviewed. Pulmonary hypertension   Pulmonary:   COPD, mild Emphysema without inhaler use  Without home oxygen use  Without pulmonologist  Without hospitalizations for exacerbations at this time   Renal/:   Chronic Renal Disease (Patient dialyzed yesterday. AV fistula hematoma, she is going to get a dialysis catheter placed later.), ESRD, Dialysis renal calculi  Dialysis Monday Wednesday Friday   Hepatic/GI:   Chronic nausea   Musculoskeletal:   Lymphedema due to radiation Spine Disorders: cervical and lumbar Disc disease, Degenerative disease and Chronic Pain    Neurological:   Neuromuscular Disease, (LTAC) Bilateral lower extremity neuropathy  Peripheral Neuropathy    Endocrine:   Diabetes, poorly controlled Hypothyroidism    Psych:  Psychiatric Normal           Physical Exam  General:  Well nourished      Airway/Jaw/Neck:  Airway Findings: Mouth Opening: Normal   Tongue: Normal   General Airway Assessment: Adult Mallampati: II  Improves to II with phonation.  TM Distance: Normal, at least 6 cm   Jaw/Neck Findings:  Neck ROM: Extension Decreased, Mild       Dental:  Dental Findings: Upper partial dentures, Lower partial dentures     Chest/Lungs:  Chest/Lungs Findings: Clear to auscultation, Normal Respiratory Rate      Heart/Vascular:  Heart Findings: Rate: Normal  Rhythm: Regular Rhythm  Sounds: Normal        Mental Status:  Mental Status Findings:  Cooperative,  Alert and Oriented         Anesthesia Plan  Type of Anesthesia, risks & benefits discussed:  Anesthesia Type:  Gen ETT    Patient's Preference:   Plan Factors:          Intra-op Monitoring Plan: standard ASA monitors  Intra-op Monitoring Plan Comments:   Post Op Pain Control Plan: per primary service following discharge from PACU, IV/PO Opioids PRN and multimodal analgesia  Post Op Pain Control Plan Comments:     Induction:   IV and rapid sequence  Beta Blocker:  Patient is not currently on a Beta-Blocker (No further documentation required).       Informed Consent: Informed consent signed with the Patient and all parties understand the risks and agree with anesthesia plan.  All questions answered.  Anesthesia consent signed with patient.  ASA Score: 3     Day of Surgery Review of History & Physical:        Anesthesia Plan Notes:     GETA  Zofran, Pepcid  Ofirmev 1000 mg             Ready For Surgery From Anesthesia Perspective.           Physical Exam  General: Well nourished    Airway:  Mallampati: II / II  Mouth Opening: Normal  TM Distance: Normal, at least 6 cm  Tongue: Normal  Neck ROM: Extension Decreased, Mild    Dental:  Upper partial dentures, Lower partial dentures    Chest/Lungs:  Clear to auscultation, Normal Respiratory Rate    Heart:  Rate: Normal  Rhythm: Regular Rhythm  Sounds: Normal          Anesthesia Plan  Type of Anesthesia, risks & benefits discussed:    Anesthesia Type: Gen ETT  Intra-op Monitoring Plan: standard ASA monitors  Post Op Pain Control Plan: per primary service following discharge from PACU, IV/PO Opioids PRN and multimodal analgesia  Induction:  IV and rapid sequence  Informed Consent: Informed consent signed with the Patient and all parties understand the risks and agree with anesthesia plan.  All questions answered.   ASA Score: 3  Anesthesia Plan Notes:     GETA  Zofran, Pepcid  Ofirmev 1000 mg         Ready For Surgery From Anesthesia Perspective.       .

## 2022-06-22 NOTE — PLAN OF CARE
Problem: Adult Inpatient Plan of Care  Goal: Plan of Care Review  Outcome: Ongoing, Progressing  Goal: Patient-Specific Goal (Individualized)  Outcome: Ongoing, Progressing  Goal: Absence of Hospital-Acquired Illness or Injury  Outcome: Ongoing, Progressing  Goal: Optimal Comfort and Wellbeing  Outcome: Ongoing, Progressing  Goal: Readiness for Transition of Care  Outcome: Ongoing, Progressing     Problem: Adjustment to Illness (Sepsis/Septic Shock)  Goal: Optimal Coping  Outcome: Ongoing, Progressing     Problem: Bleeding (Sepsis/Septic Shock)  Goal: Absence of Bleeding  Outcome: Ongoing, Progressing     Problem: Glycemic Control Impaired (Sepsis/Septic Shock)  Goal: Blood Glucose Level Within Desired Range  Outcome: Ongoing, Progressing     Problem: Infection Progression (Sepsis/Septic Shock)  Goal: Absence of Infection Signs and Symptoms  Outcome: Ongoing, Progressing     Problem: Nutrition Impaired (Sepsis/Septic Shock)  Goal: Optimal Nutrition Intake  Outcome: Ongoing, Progressing     Problem: Fluid and Electrolyte Imbalance (Acute Kidney Injury/Impairment)  Goal: Fluid and Electrolyte Balance  Outcome: Ongoing, Progressing     Problem: Oral Intake Inadequate (Acute Kidney Injury/Impairment)  Goal: Optimal Nutrition Intake  Outcome: Ongoing, Progressing     Problem: Renal Function Impairment (Acute Kidney Injury/Impairment)  Goal: Effective Renal Function  Outcome: Ongoing, Progressing     Problem: Fluid Imbalance (Pneumonia)  Goal: Fluid Balance  Outcome: Ongoing, Progressing     Problem: Infection (Pneumonia)  Goal: Resolution of Infection Signs and Symptoms  Outcome: Ongoing, Progressing     Problem: Respiratory Compromise (Pneumonia)  Goal: Effective Oxygenation and Ventilation  Outcome: Ongoing, Progressing     Problem: Impaired Wound Healing  Goal: Optimal Wound Healing  Outcome: Ongoing, Progressing     Problem: Infection  Goal: Absence of Infection Signs and Symptoms  Outcome: Ongoing, Progressing      Problem: Device-Related Complication Risk (Hemodialysis)  Goal: Safe, Effective Therapy Delivery  Outcome: Ongoing, Progressing     Problem: Hemodynamic Instability (Hemodialysis)  Goal: Effective Tissue Perfusion  Outcome: Ongoing, Progressing     Problem: Infection (Hemodialysis)  Goal: Absence of Infection Signs and Symptoms  Outcome: Ongoing, Progressing

## 2022-06-22 NOTE — CONSULTS
[facility] Established Patient/H&P:    Shara Hutchins is a 72 y.o. female who presents for recurrent right pyelonephritis.     Patient with a history of HTN, vulvar cancer s/p chemo/XRT in 2013 and CKD who presented to the [facility] emergency department on 2/23/20 with abdominal pain, vaginal bleeding, DARCI  (3.8 from 2.5 baseline) and a 20 pound weight loss. Lee was placed on admission to monitor UOP.   · CTRSS 2/12 2/23 showed bilateral nonobstructing renal stones.     · She underwent loop ileostomy on 2/13/20 for chronic diarrhea. Renal ultrasound on 2/26 showed no hydro, b renal stones and indeterminate artifact. She went to [facility] on 3/7 and returned and stated gross hematuria started after this. Prior to admission pt denied having gross hematuria, urinary frequency, vaginal bleeding or frequent uti's. She did however state that her vulvar itching had returned and she was concerned about recurrence of cancer with no recent f/u by gyn onc. She hasd seen by  inpt and plan was for outpt f/u with .   · She had been awaiting LTAC placement on 3/13. Consulted initially for persistent hematuria. The plan was for outpt cysto to eval for any bladder path not seen on ct and discussion of renal stones as well as discharging with lee. Her hematuria continued, ucx on 3/15/20 grew Klebsiella and she was started on rocephin and completed a 5d course. Around 3/17  she was given a voiding trial vs lee exchange? on 3/17 for larger 18fr.    · She continued to have hematuria requiring intermittent irrigation and 2 transfusions. Urology was re-consulted 3/24, Dr. Garcia asked them to remove the 18 Fr, place a 22 [place]. CBI weaned off. She underwent ileostomy reversal on 5/18/20. Discharged from the hospital on 5/23/20.  · 6/10/20: Patient presents today with hematuria for one day. She was prescribed Pyridium and Macrodantin for presumed UTI.    · 6/16/20: Patient presented  today [facility] ED on 6/14/20 with worsening gross hematuria. Urology consulted to assist with management. States she became light-headed and dizzy at home due to anemia.   · 6/25/20: She is s/p cystoscopy with fulguration on 6/16/20. Placed on CBI overnight for monitoring. Urine remained clear and patient discharged from [facility]. She presents today with her lee in place and clear urine.Here for voiding trial.   · 3/16/21: Patient presented to [facility] ED on 3/15/21 complaining of a 2 days history of right flank pain associated with chills and nausea. CTRSS revealed several distal right UVJ stones - largest measuring 8 mm with associated moderate right hydroureteronephrosis. Also with bilateral non-obstructing nephrolithiasis. Urine culture with gram negative rods.     · 3/30/21 right ureteroscopy with laser lithotripsy and stent exchange. Several impacted stones identified and lasered.   · 4/20/21:  Second look-ureteroscopy by   · 5/13/21:  Stent removed in clinic today at bedside. Patient with chronic diarrhea. Stones are calcium oxalate monohydrate.   · 5/17/21 ctrss:  CT renal stone with recurrent hydroureteronephrosis. Stones layering in R ureter.     · 5/18/21 seen by : Patient presented to the ED yesterday with fever, chills and recurrent right flank pain after stent removal.  placed another stent and remove some of the bladder stones. Planned for follow up with stent exchange at a later date, but did not follow up.   · 1/17/22 tele note by  is now on HD M, W,F for ESRD for the last year. She came to ER yesterday with recurrent vulvar cellultis and diarrhea associated with low grade fever. A CTAP in the ER showed moderate R hydro with stent with the proximal curl in the distal renal pelvis. 1 stone in R kidney, multiple stones in L. No urine sample available. She states she didn't know she had a stent and had to f/uplaced in a while but tends to be difficult and painful due to. She  denies any flank pain or recent uti's.   · 1/18/22 seen by : Patient admitted at [facility] for vulvar cellulitis. CT abdomen pelvis revealed severe right hydroureteronephrosis with the stent in place and a 6 mm mid-ureteral stone. Urology consulted to assist with management. No fever or chills. Urine culture negative. Remains on dialysis. She denies any bone pain, weight loss or  trauma.She still urinates but is incontinent due to how swollen her labia are. She hasn't had a catheter   · 1/18 22 cysto R stent exchange by      Interval history since last visit with me:  Pt presented to ER yesterday with R flank pain. Voided ua + for infection. Wbc 12.  ucx growing GNR. On HD.     ctrss 6/22/22:  Moderate right hydronephrosis with perinephric fat stranding and right double-J ureteral stent in place. There are a few gas bubbles in the right renal pelvis and bladder. This could reflect prior manipulation of the urinary tract without being able to exclude a gas-forming infectious process. Less stone burden on right than previous. May be able to get stent out eventually.         HPI/CC today 6/22/22:   Right flank pain improved with pain meds.           Urine culture    6/21/2022       GRAM NEGATIVE CRISTHIAN, LACTOSE  (A) .   .   4/26/2022      5:06 PM clinically necessary.   4/26/2022      5:06 PM Multiple organisms isolated. None in predominance.  Repeat if   4/23/2022       KLEBSIELLA OXYTOCA ESBL (A) . . .   No growth  1/18/22  Multi orgs                    4/13/21  GNRs                          3/15/21  E. Coli                          12/8/20  Multiple organisms      12/9/20  No growth                    6/16/20  Enterobacter               6/14/20  E. Coli                          6/3/20  Enterococcus              5/21/20  Pseudomonas             3/24/20                 Urine cytology  Negative                      6/16/20       Past Medical History:   Diagnosis Date    Cellulitis of trunk      Perineum, numerous episodes    Chronic diarrhea 02/10/2020    Short-gut syndrome    Diabetes mellitus, type 2     pt has not had for years after losing weight    End stage renal disease on dialysis 01/07/2018    Fatigue     Hypertension     Hypothyroidism 2/10/2020    Iron deficiency anemia due to chronic blood loss 1/7/2018    Pulmonary hypertension 3/20/2020    Vulvar cancer 1/7/2019       Past Surgical History:   Procedure Laterality Date    ABDOMINAL SURGERY      ANGIOGRAPHY OF ARTERIOVENOUS SHUNT N/A 1/19/2022    Procedure: Fistulogram with Possible Intervention;  Surgeon: Raz Maher MD;  Location: [facility] CATH/EP LAB;  Service: Cardiothoracic;  Laterality: N/A;    APPENDECTOMY      AV FISTULA PLACEMENT Left 2018    BACK SURGERY      BLADDER FULGURATION N/A 6/16/2020    Procedure: FULGURATION, BLADDER;  Surgeon: Damari Barber Jr., MD;  Location: [facility] OR;  Service: Urology;  Laterality: N/A;    carpel tunnel surgery once on each hand      CERVICAL FUSION      x 4    cervix repair      CHOLECYSTECTOMY  2000    COLECTOMY      CYSTOSCOPY W/ RETROGRADES  6/16/2020    Procedure: CYSTOSCOPY, WITH RETROGRADE PYELOGRAM;  Surgeon: Damari Barber Jr., MD;  Location: [facility] OR;  Service: Urology;;    CYSTOSCOPY W/ RETROGRADES Right 4/20/2021    Procedure: CYSTOSCOPY, WITH RETROGRADE PYELOGRAM;  Surgeon: Daamri Barber Jr., MD;  Location: [facility] OR;  Service: Urology;  Laterality: Right;    CYSTOSCOPY W/ URETERAL STENT PLACEMENT Right 3/16/2021    Procedure: CYSTOSCOPY, WITH URETERAL STENT INSERTION;  Surgeon: Damari Barber Jr., MD;  Location: [facility] OR;  Service: Urology;  Laterality: Right;    CYSTOSCOPY W/ URETERAL STENT PLACEMENT Right 5/18/2021    Procedure: CYSTOSCOPY, WITH URETERAL STENT INSERTION;  Surgeon: Damari Barber Jr., MD;  Location: [facility] OR;  Service: Urology;  Laterality: Right;    CYSTOSCOPY W/ URETERAL STENT REMOVAL Right 4/20/2021    Procedure: CYSTOSCOPY, WITH URETERAL STENT  REMOVAL;  Surgeon: Damari Barber Jr., MD;  Location: [facility] OR;  Service: Urology;  Laterality: Right;    CYSTOSCOPY WITH URETEROSCOPY, RETROGRADE PYELOGRAPHY, AND INSERTION OF STENT Right 3/30/2021    Procedure: CYSTOSCOPY, WITH RETROGRADE PYELOGRAM AND URETERAL STENT INSERTION;  Surgeon: Damari Barber Jr., MD;  Location: [facility] OR;  Service: Urology;  Laterality: Right;    CYSTOURETEROSCOPY WITH RETROGRADE PYELOGRAPHY AND INSERTION OF STENT INTO URETER Right 1/18/2022    Procedure: CYSTOURETEROSCOPY, WITH RETROGRADE PYELOGRAM AND URETERAL STENT INSERTION;  Surgeon: Damari Barber Jr., MD;  Location: [facility] OR;  Service: Urology;  Laterality: Right;    DIAGNOSTIC LAPAROSCOPY N/A 2/13/2020    Procedure: LAPAROSCOPY, DIAGNOSTIC;  Surgeon: Robbi Lovell III, MD;  Location: [facility] OR;  Service: General;  Laterality: N/A;    HYSTERECTOMY  1980    ILEOSTOMY N/A 2/13/2020    Procedure: CREATION, ILEOSTOMY Loop;  Surgeon: Robbi Lovell III, MD;  Location: [facility] OR;  Service: General;  Laterality: N/A;    ILEOSTOMY CLOSURE N/A 5/18/2020    Procedure: CLOSURE, ILEOSTOMY;  Surgeon: Robbi Lovell III, MD;  Location: [facility] OR;  Service: General;  Laterality: N/A;    LASER LITHOTRIPSY Right 3/30/2021    Procedure: LITHOTRIPSY, USING LASER;  Surgeon: Damari Barber Jr., MD;  Location: [facility] OR;  Service: Urology;  Laterality: Right;    LUMBAR LAMINECTOMY      LYSIS OF ADHESIONS N/A 2/13/2020    Procedure: LYSIS, ADHESIONS;  Surgeon: Robbi Lovell III, MD;  Location: [facility] OR;  Service: General;  Laterality: N/A;    NECK SURGERY      PERCUTANEOUS TRANSLUMINAL ANGIOPLASTY OF ARTERIOVENOUS FISTULA N/A 1/19/2022    Procedure: PTA, AV FISTULA;  Surgeon: Raz Maher MD;  Location: [facility] CATH/EP LAB;  Service: Cardiothoracic;  Laterality: N/A;    PHLEBOGRAPHY  2/28/2020    Procedure: VENOGRAM;  Surgeon: Robbi Lovell III, MD;  Location: [facility] OR;  Service: General;;    REMOVAL OF VASCULAR ACCESS PORT Right  2/13/2020    Procedure: REMOVAL, VASCULAR ACCESS PORT;  Surgeon: Robbi Lovell III, MD;  Location: [facility] OR;  Service: General;  Laterality: Right;    RETROGRADE PYELOGRAPHY  3/16/2021    Procedure: PYELOGRAM, RETROGRADE;  Surgeon: Damari Barber Jr., MD;  Location: [facility] OR;  Service: Urology;;    RETROGRADE PYELOGRAPHY Right 5/18/2021    Procedure: PYELOGRAM, RETROGRADE;  Surgeon: Damari Barber Jr., MD;  Location: [facility] OR;  Service: Urology;  Laterality: Right;    SHOULDER SURGERY      URETEROSCOPIC REMOVAL OF URETERIC CALCULUS Right 3/30/2021    Procedure: REMOVAL, CALCULUS, URETER, URETEROSCOPIC;  Surgeon: Damari Barber Jr., MD;  Location: [facility] OR;  Service: Urology;  Laterality: Right;    URETEROSCOPIC REMOVAL OF URETERIC CALCULUS Right 4/20/2021    Procedure: REMOVAL, CALCULUS, URETER, URETEROSCOPIC;  Surgeon: Damari Barber Jr., MD;  Location: [facility] OR;  Service: Urology;  Laterality: Right;    vulva cancer         Review of patient's allergies indicates:   Allergen Reactions    Ciprofloxacin Other (See Comments)     Elevated liver enzymes      Pcn [penicillins] Anaphylaxis     TOLERATES ROCEPHIN WITHOUT DIFFICULTY     R       FOCUSED PHYSICAL EXAM:    Vitals:    06/22/22 1030 06/22/22 1100 06/22/22 1130 06/22/22 1200   BP: 125/60 126/73 121/64 119/62   BP Location:       Patient Position:       Pulse: 78 78 69 66   Resp:       Temp:       TempSrc:       SpO2:       Weight:       Height:             General:wdwn  in NAD  Neurologic: CN grossly normal. Normal sensation.   Psychiatric: awake, alert and oriented x 3. Mood and affect Normal. Cooperative.  Eyes: PERRLA, normal conjunctiva  Respiratory: no increased work on breathing. No wheezing.   Cardiovascular: No obvious extremity edema. Warm and well perfused.  GI: no obvious stomach distension  Musculoskeletal: decreased  range of motion of bilateral upper extremities. Decreased muscle strength and tone.  Skin: no obvious rashes or lesions. No  tightening of skin noted.    Pertinent  exam in HPI    Recent Labs   Lab 06/18/22  0314 06/21/22  0955 06/22/22  0606   WBC 6.55 12.77 H 9.01   Hemoglobin 10.5 L 11.5 L 10.4 L   Hematocrit 32.7 L 36.3 L 32.6 L   Platelets 214 202 167   ]  Recent Labs   Lab 01/18/22  0412 01/21/22  0424 06/16/22  1434 06/16/22  1435 06/17/22  0453 06/18/22  0314 06/21/22  0955 06/22/22  0606   Sodium 135 L   < > 137  --  137 138 139 139   Potassium 4.4   < > 3.5  --  3.8 4.0 3.3 L 3.6   Chloride 104   < > 103  --  103 102 105 104   CO2 20 L   < > 23  --  25 27 25 25   BUN 36 H   < > 19  --  26 H 23 28 H 41 H   Creatinine 6.7 H   < > 4.3 H  --  4.8 H 3.9 H 4.3 H 5.1 H   Glucose 79   < > 85  --  105 128 H 128 H 108   Calcium 8.2 L   < > 8.3 L  --  8.6 L 8.5 L 8.6 L 8.3 L   Magnesium 1.9   < >  --  1.5 L 2.0 2.0  --   --    Phosphorus 6.6 H  --   --   --  4.8 H 4.0  --   --    Alkaline Phosphatase  --    < > 110  --   --   --  103 98   Total Protein  --    < > 6.3  --   --   --  6.5 5.6 L   Albumin  --    < > 3.4 L  --   --   --  3.5 3.2 L   Total Bilirubin  --    < > 0.4  --   --   --  1.2 H 0.6   AST  --    < > 12  --   --   --  17 12   ALT  --    < > 9 L  --   --   --  14 12    < > = values in this interval not displayed.   ]    Lab Results   Component Value Date    LABA1C 4.4 01/23/2020    HGBA1C 4.6 05/25/2021             Assessment/Diagnosis:    Shara Hutchins is a 72 y.o. female admitted for R emphysematous pyelitis w indwelling stent placed in January. Appears to be obstructed now. Does not appear to be many stones remaining on right. Significant stone burden on left. Will exchange stent since acutely infected (or place a new one adjacent to the one in there if unable to remove the stent and then defer to  to remove after infection treated).    I can't see where pt has a h/o stricture. In may 2021 appears to have had an obstructing stone causing hydro. Now mostly stone free likely from passing stones adjacent to stent.  After stent exchange and treatment for infection will check with  if pt should have ureteroscopy, removal of remaining stones and then stent removal on right.     Still has a large stone burden on left      Plan:  To OR today for  cystoscopy and right stent exchange or placement if unable to remove other stent   If a stent is unable to be placed the patient was told that they would need a nephrostomy tube placed by Radiology in a separate setting in order to drain the kidney    Special considerations for this patient:    24 hour urine? On dialysis   Start potassium citrate/moonstone citrate to prevent more stones on stents.   Can pt have R stent removed after uti treated and URS to remove remaining R renal stones. Much less stone burden than before.    Still has large L stone burden      Stent consent:  The patient was counseled extensively on the stent being only temporary. The pt understands that a stent should not be in longer than 1 year, and preferably less than 3 months. A stent can become encrusted, block urine flow and cause kidney function loss if it is not exchanged or removed at least within a year.  They understand that if the stent remains longer becomes encrusted it would require major surgeries and possible nephrectomy to remove the stent and or stones or affected kidney if unable to treat the stones endoscopically.

## 2022-06-22 NOTE — CONSULTS
6/21/22:  Reviewed chart and case with Dr. Olson  Hydronephrosis definitely worse, then last imaging in April. Stent last changed in January.  She is on prednisone for recent COPD exacerbation    I have increased the dose of the merrem in the short term and if she is no longer wheezing, I would stop the prednisone.   Will see in person tomorrow  I believe she needs cysto and exchange of stent    6/22/22:  Consult Note  Infectious Disease    Reason for Consult:  Right sided pyelonephritis    HPI: Shara Hutchins is a 72 y.o. female known to me from multiple prior hospitalizations, presented to the ED yesterday with acute onset, severe, right flank pain c/w passing a kidney stone. In the ED she had a CTRSS which demonstrated worsening hydronephrosis compared to 4/28 and air in the renal pelvis. UA was abnormal with >100 WBC obtained by catheterization. She had no fever, but did have leukocytosis. she had nausea and vomiting from the pain. 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  She was recently discharged, 6/19 on steroids for right sided chest/pleuritic pain. Urine culture is growing a presumptive Ecoli.     Review of patient's allergies indicates:   Allergen Reactions    Ciprofloxacin Other (See Comments)     Elevated liver enzymes      Pcn [penicillins] Anaphylaxis     TOLERATES ROCEPHIN WITHOUT DIFFICULTY     Past Medical History:   Diagnosis Date    Cellulitis of trunk     Perineum, numerous episodes    Chronic diarrhea 02/10/2020    Short-gut syndrome    Diabetes mellitus, type 2     pt has not had for years after losing weight    End stage renal disease on dialysis 01/07/2018    Fatigue     Hypertension     Hypothyroidism 2/10/2020    Iron deficiency anemia due to chronic blood loss 1/7/2018    Pulmonary hypertension 3/20/2020    Vulvar cancer 1/7/2019     Past Surgical History:   Procedure Laterality Date    ABDOMINAL SURGERY       ANGIOGRAPHY OF ARTERIOVENOUS SHUNT N/A 1/19/2022    Procedure: Fistulogram with Possible Intervention;  Surgeon: Raz Maher MD;  Location: [facility] CATH/EP LAB;  Service: Cardiothoracic;  Laterality: N/A;    APPENDECTOMY      AV FISTULA PLACEMENT Left 2018    BACK SURGERY      BLADDER FULGURATION N/A 6/16/2020    Procedure: FULGURATION, BLADDER;  Surgeon: Damari Barber Jr., MD;  Location: [facility] OR;  Service: Urology;  Laterality: N/A;    carpel tunnel surgery once on each hand      CERVICAL FUSION      x 4    cervix repair      CHOLECYSTECTOMY  2000    COLECTOMY      CYSTOSCOPY W/ RETROGRADES  6/16/2020    Procedure: CYSTOSCOPY, WITH RETROGRADE PYELOGRAM;  Surgeon: Damari Barber Jr., MD;  Location: [facility] OR;  Service: Urology;;    CYSTOSCOPY W/ RETROGRADES Right 4/20/2021    Procedure: CYSTOSCOPY, WITH RETROGRADE PYELOGRAM;  Surgeon: Damari Barber Jr., MD;  Location: [facility] OR;  Service: Urology;  Laterality: Right;    CYSTOSCOPY W/ URETERAL STENT PLACEMENT Right 3/16/2021    Procedure: CYSTOSCOPY, WITH URETERAL STENT INSERTION;  Surgeon: Damari Barber Jr., MD;  Location: [facility] OR;  Service: Urology;  Laterality: Right;    CYSTOSCOPY W/ URETERAL STENT PLACEMENT Right 5/18/2021    Procedure: CYSTOSCOPY, WITH URETERAL STENT INSERTION;  Surgeon: Damari Barber Jr., MD;  Location: [facility] OR;  Service: Urology;  Laterality: Right;    CYSTOSCOPY W/ URETERAL STENT REMOVAL Right 4/20/2021    Procedure: CYSTOSCOPY, WITH URETERAL STENT REMOVAL;  Surgeon: Damari Barber Jr., MD;  Location: [facility] OR;  Service: Urology;  Laterality: Right;    CYSTOSCOPY WITH URETEROSCOPY, RETROGRADE PYELOGRAPHY, AND INSERTION OF STENT Right 3/30/2021    Procedure: CYSTOSCOPY, WITH RETROGRADE PYELOGRAM AND URETERAL STENT INSERTION;  Surgeon: Damari Barber Jr., MD;  Location: [facility] OR;  Service: Urology;  Laterality: Right;    CYSTOURETEROSCOPY WITH RETROGRADE PYELOGRAPHY AND INSERTION OF STENT INTO URETER Right 1/18/2022     Procedure: CYSTOURETEROSCOPY, WITH RETROGRADE PYELOGRAM AND URETERAL STENT INSERTION;  Surgeon: Damari Barber Jr., MD;  Location: [facility] OR;  Service: Urology;  Laterality: Right;    DIAGNOSTIC LAPAROSCOPY N/A 2/13/2020    Procedure: LAPAROSCOPY, DIAGNOSTIC;  Surgeon: Robbi Lovell III, MD;  Location: [facility] OR;  Service: General;  Laterality: N/A;    HYSTERECTOMY  1980    ILEOSTOMY N/A 2/13/2020    Procedure: CREATION, ILEOSTOMY Loop;  Surgeon: Robbi Lovell III, MD;  Location: [facility] OR;  Service: General;  Laterality: N/A;    ILEOSTOMY CLOSURE N/A 5/18/2020    Procedure: CLOSURE, ILEOSTOMY;  Surgeon: Robbi Lovell III, MD;  Location: [facility] OR;  Service: General;  Laterality: N/A;    LASER LITHOTRIPSY Right 3/30/2021    Procedure: LITHOTRIPSY, USING LASER;  Surgeon: Damari Barber Jr., MD;  Location: [facility] OR;  Service: Urology;  Laterality: Right;    LUMBAR LAMINECTOMY      LYSIS OF ADHESIONS N/A 2/13/2020    Procedure: LYSIS, ADHESIONS;  Surgeon: Robbi Lovell III, MD;  Location: [facility] OR;  Service: General;  Laterality: N/A;    NECK SURGERY      PERCUTANEOUS TRANSLUMINAL ANGIOPLASTY OF ARTERIOVENOUS FISTULA N/A 1/19/2022    Procedure: PTA, AV FISTULA;  Surgeon: Raz Maher MD;  Location: [facility] CATH/EP LAB;  Service: Cardiothoracic;  Laterality: N/A;    PHLEBOGRAPHY  2/28/2020    Procedure: VENOGRAM;  Surgeon: Robbi Lovell III, MD;  Location: [facility] OR;  Service: General;;    REMOVAL OF VASCULAR ACCESS PORT Right 2/13/2020    Procedure: REMOVAL, VASCULAR ACCESS PORT;  Surgeon: Robbi Lovell III, MD;  Location: [facility] OR;  Service: General;  Laterality: Right;    RETROGRADE PYELOGRAPHY  3/16/2021    Procedure: PYELOGRAM, RETROGRADE;  Surgeon: Damari Barber Jr., MD;  Location: [facility] OR;  Service: Urology;;    RETROGRADE PYELOGRAPHY Right 5/18/2021    Procedure: PYELOGRAM, RETROGRADE;  Surgeon: Damari Barber Jr., MD;  Location: [facility] OR;  Service: Urology;  Laterality: Right;     SHOULDER SURGERY      URETEROSCOPIC REMOVAL OF URETERIC CALCULUS Right 3/30/2021    Procedure: REMOVAL, CALCULUS, URETER, URETEROSCOPIC;  Surgeon: Damari Barber Jr., MD;  Location: [facility] OR;  Service: Urology;  Laterality: Right;    URETEROSCOPIC REMOVAL OF URETERIC CALCULUS Right 2021    Procedure: REMOVAL, CALCULUS, URETER, URETEROSCOPIC;  Surgeon: Damari Barber Jr., MD;  Location: [facility] OR;  Service: Urology;  Laterality: Right;    vulva cancer       Social History     Socioeconomic History    Marital status:    Tobacco Use    Smoking status: Former Smoker     Packs/day: 1.00     Years: 33.00     Pack years: 33.00     Types: Cigarettes     Quit date: 2000     Years since quittin.4    Smokeless tobacco: Former User     Quit date:    Substance and Sexual Activity    Alcohol use: No    Drug use: No     Family History   Problem Relation Age of Onset    Heart disease Mother     Hypertension Mother     Cancer Mother         lung    Heart disease Father     Heart disease Sister         Open heart surgery    No Known Problems Daughter          Review of Systems:   No chills, fever, sweats,    No change in vision,    No pain in mouth or throat. No problems with teeth, gums.  No angina palpitations, syncope  No cough, sputum production, shortness of breath,      nausea, vomiting, no rasmussen ein usual diarrhea,and no focal abd pain,  No dysphagia, odynophagia  No dysuria, hematuria, strangury,    No swelling of joints, redness of joints, injuries, or new focal pain  No unusual headaches,  falls  No anxiety, depression, substance abuse, sleep disturbance  History of diabetes  No bleeding, lymphadenopathy,  malignancy, unusual bruising  No new rashes, lesions, or wounds       EXAM & DIAGNOSTICS REVIEWED:   Vitals:     Temp:  [97.7 °F (36.5 °C)-98.8 °F (37.1 °C)]   Temp: 97.7 °F (36.5 °C) (22 0308)  Pulse: 80 (22 0308)  Resp: 16 (22)  BP: 114/63 (22  0308)  SpO2: 95 % (06/22/22 0308)    Intake/Output Summary (Last 24 hours) at 6/22/2022 0803  Last data filed at 6/22/2022 0400  Gross per 24 hour   Intake --   Output 1150 ml   Net -1150 ml       General:  In NAD. Alert and attentive, cooperative, comfortable  Eyes:  Anicteric, PERRL, EOMI  ENT:  No ulcers, exudates, thrush, nares patent,    Neck:  supple, no masses or adenopathy appreciated  Lungs: Clear, no consolidation, rales, wheezes, rub  Heart:  RRR, no gallop/murmur/rub noted  Abd:  Soft, NT, ND, normal BS, no masses or organomegaly appreciated.  :  Voids/ and vulvar swelling and erythema is at her baseline  Musc:  Joints without effusion, swelling, erythema, synovitis, muscle wasting.   Skin:  No rashes. No palmar or plantar lesions. No subungual petechiae. She does have clubbing  Neuro:             Alert, attentive, speech fluent, face symmetric, moves all extremities, no focal weakness. Ambulatory  Psych: Calm, cooperative     Extrem: No edema, erythema, phlebitis, cellulitis, warm and well perfused  VAD:  Peripheral. AVF for dialysis     Isolation:  contact  Wound:            General Labs reviewed:  Recent Labs   Lab 06/18/22  0314 06/21/22  0955 06/22/22  0606   WBC 6.55 12.77* 9.01   HGB 10.5* 11.5* 10.4*   HCT 32.7* 36.3* 32.6*    202 167       Recent Labs   Lab 06/16/22  1434 06/17/22  0453 06/18/22  0314 06/21/22  0955 06/22/22  0606      < > 138 139 139   K 3.5   < > 4.0 3.3* 3.6      < > 102 105 104   CO2 23   < > 27 25 25   BUN 19   < > 23 28* 41*   CREATININE 4.3*   < > 3.9* 4.3* 5.1*   CALCIUM 8.3*   < > 8.5* 8.6* 8.3*   PROT 6.3  --   --  6.5 5.6*   BILITOT 0.4  --   --  1.2* 0.6   ALKPHOS 110  --   --  103 98   ALT 9*  --   --  14 12   AST 12  --   --  17 12    < > = values in this interval not displayed.     No results for input(s): CRP in the last 168 hours.      Micro:  Microbiology Results (last 7 days)     Procedure Component Value Units Date/Time    Urine culture  [[id number]]  (Abnormal) Collected: 06/21/22 1306    Order Status: Completed Specimen: Urine Updated: 06/22/22 0654     Urine Culture, Routine GRAM NEGATIVE CRISTHIAN, LACTOSE   >100,000 cfu/ml  Identification and susceptibility pending      Narrative:      Specimen Source->Urine    Blood culture [[id number]] Collected: 06/21/22 1937    Order Status: Completed Specimen: Blood Updated: 06/22/22 0517     Blood Culture, Routine No Growth to date          Imaging Reviewed:   CXR   CTRSS      Cardiology:    IMPRESSION & PLAN   1. Right sided pyelonephritis with worsening hydronephrosis despite stent   Presumptive Ecoli on cath urine culture    2. On steroids for recent pulmonary symptoms, no wheezing  3. ESRD on dialysis  4. 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

## 2022-06-22 NOTE — ANESTHESIA PROCEDURE NOTES
Intubation    Date/Time: 6/22/2022 3:33 PM  Performed by: Yazan Jaime CRNA  Authorized by: Zeferino Shafer MD     Intubation:     Induction:  Rapid sequence induction    Intubated:  Postinduction    Mask Ventilation:  N/a    Attempts:  1    Attempted By:  CRNA    Method of Intubation:  Direct    Blade:  Arevalo 3    Laryngeal View Grade: Grade I - full view of cords      Difficult Airway Encountered?: No      Complications:  None    Airway Device:  Oral endotracheal tube    Airway Device Size:  7.0    Style/Cuff Inflation:  Cuffed    Tube secured:  21    Secured at:  The lips    Placement Verified By:  Capnometry    Complicating Factors:  None    Findings Post-Intubation:  BS equal bilateral and atraumatic/condition of teeth unchanged

## 2022-06-22 NOTE — PATIENT INSTRUCTIONS
Your urologist is: Dr.Tonye Barber   Office number: [phone]  Address: [address] [place] [address], [address], [facility] [address]      PLEASE READ the following directions and contact our office with any questions via phone or the portal. If you were told that you need an appointment and no appointment was made, call the office the day after surgery and ask to speak with  nurse to make an appointment.    Disposition:    What  did:  Exchanged a right ureteral stent to drain infection due to obstructed indwelling stent.     Recommend antibiotics for 10-14 days total depending on final cultures  Follow up with her urologist Dr.Tonye Barber. consider ureteroscopy and another retrograde to determine if stent can come out after uti adequately treated (now that stone burden much less on right). Still has stones on left. Would not wait 5 months to exchange stent next time. Stent encrusted. Consider starting on citrate supplementation to prevent encrustation.

## 2022-06-22 NOTE — CARE UPDATE
06/21/22 2100   Patient Assessment/Suction   Level of Consciousness (AVPU) alert   Respiratory Effort Normal;Unlabored   Expansion/Accessory Muscles/Retractions no retractions;no use of accessory muscles   All Lung Fields Breath Sounds equal bilaterally   PRE-TX-O2   O2 Device (Oxygen Therapy) room air   SpO2 98 %   Pulse 68   Resp 18

## 2022-06-22 NOTE — CARE UPDATE
CM attempted assessment but patient out of room in a procedure. Will attempt initial assessment in am.

## 2022-06-23 NOTE — PROGRESS NOTES
6/21/22:  Reviewed chart and case with Dr. Olson  Hydronephrosis definitely worse, then last imaging in April. Stent last changed in January.  She is on prednisone for recent COPD exacerbation    I have increased the dose of the merrem in the short term and if she is no longer wheezing, I would stop the prednisone.   Will see in person tomorrow  I believe she needs cysto and exchange of stent    6/22/22:  Consult Note  Infectious Disease    Reason for Consult:  Right sided pyelonephritis    HPI: Shara Hutchins is a 72 y.o. female known to me from multiple prior hospitalizations, presented to the ED yesterday with acute onset, severe, right flank pain c/w passing a kidney stone. In the ED she had a CTRSS which demonstrated worsening hydronephrosis compared to 4/28 and air in the renal pelvis. UA was abnormal with >100 WBC obtained by catheterization. She had no fever, but did have leukocytosis. she had nausea and vomiting from the pain. 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  She was recently discharged, 6/19 on steroids for right sided chest/pleuritic pain. Urine culture is growing a presumptive Ecoli.     6/23: interim reviewed. Urology assistance appreciated. Urine culture with Klebsiella ESBL. She was treated for this from voided urine culture 4/23x a week or more. Has sore throat from being intubated. Eating ok. Right chest sounds congested today. Obtained CXR with no new infiltrates. She is ambulatory. Discussed plan for home IV antibiotics.     EXAM & DIAGNOSTICS REVIEWED:   Vitals:     Temp:  [97.8 °F (36.6 °C)-99.7 °F (37.6 °C)]   Temp: 99.5 °F (37.5 °C) (06/23/22 1152)  Pulse: 85 (06/23/22 1152)  Resp: 18 (06/23/22 1152)  BP: 116/79 (06/23/22 1152)  SpO2: (!) 94 % (06/23/22 1152)    Intake/Output Summary (Last 24 hours) at 6/23/2022 1409  Last data filed at 6/23/2022 1311  Gross per 24 hour   Intake 1725 ml   Output 990 ml   Net 735 ml        General:  In NAD. Alert and attentive, cooperative, comfortable  Eyes:  Anicteric, EOMI  ENT:  No ulcers, exudates, thrush, nares patent,    Neck:  supple, no masses or adenopathy appreciated  Lungs: Crackles RLL  Heart:  RRR, no gallop/murmur/rub noted  Abd:  Soft, NT, ND, normal BS, no masses or organomegaly appreciated.  :  Voids/ and vulvar swelling and erythema is at her baseline  Musc:  Joints without effusion, swelling, erythema, synovitis, muscle wasting.   Skin:  No rashes  She does have clubbing  Neuro:             Alert, attentive, speech fluent, face symmetric, moves all extremities, no focal weakness. Ambulatory  Psych: Calm, cooperative     Extrem: No edema, erythema, phlebitis, cellulitis, warm and well perfused  VAD:  Peripheral. AVF for dialysis     Isolation:  contact  Wound:            General Labs reviewed:  Recent Labs   Lab 06/21/22  0955 06/22/22  0606 06/22/22  1526 06/23/22  0545   WBC 12.77* 9.01  --  8.81   HGB 11.5* 10.4*  --  11.0*   HCT 36.3* 32.6* 35* 35.0*    167  --  156       Recent Labs   Lab 06/16/22  1434 06/17/22  0453 06/18/22  0314 06/21/22  0955 06/22/22  0606      < > 138 139 139   K 3.5   < > 4.0 3.3* 3.6      < > 102 105 104   CO2 23   < > 27 25 25   BUN 19   < > 23 28* 41*   CREATININE 4.3*   < > 3.9* 4.3* 5.1*   CALCIUM 8.3*   < > 8.5* 8.6* 8.3*   PROT 6.3  --   --  6.5 5.6*   BILITOT 0.4  --   --  1.2* 0.6   ALKPHOS 110  --   --  103 98   ALT 9*  --   --  14 12   AST 12  --   --  17 12    < > = values in this interval not displayed.     No results for input(s): CRP in the last 168 hours.      Micro:  Microbiology Results (last 7 days)     Procedure Component Value Units Date/Time    Urine culture [[id number]] Collected: 06/22/22 1550    Order Status: Completed Specimen: Urine Updated: 06/23/22 0646     Urine Culture, Routine No growth to date    Narrative:      Specimen Source->Urine    Urine culture [[id number]]  (Abnormal)  (Susceptibility)  Collected: 06/21/22 1306    Order Status: Completed Specimen: Urine Updated: 06/23/22 0626     Urine Culture, Routine KLEBSIELLA OXYTOCA ESBL  >100,000 cfu/ml  Known ESBL patient      Narrative:      Specimen Source->Urine    Blood culture [[id number]] Collected: 06/21/22 1937    Order Status: Completed Specimen: Blood Updated: 06/23/22 0232     Blood Culture, Routine No Growth to date      No Growth to date          Imaging Reviewed:   CXR   CTRSS      Cardiology:    IMPRESSION & PLAN   1. Right sided pyelonephritis with worsening hydronephrosis despite stent   Klebsiella ESBL on culture (previously treated)    2. On steroids for recent pulmonary symptoms, no wheezing, stopped  3. ESRD on dialysis  4. 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 For 14d  Midline ok per nephrology  Unsure if  means urocit or citrate like vitamin D(procedure report)       Medical Decision Making during this encounter was  [_] Low Complexity  [_] Moderate Complexity  [xx  ] High Complexity

## 2022-06-23 NOTE — PROGRESS NOTES
Pharmacokinetic Assessment Follow Up: IV Vancomycin    Vancomycin serum concentration assessment(s):    The random level was drawn correctly and can be used to guide therapy at this time. The measurement is above the desired definitive target range of 10 to 15 mcg/mL.    Vancomycin Regimen Plan:    Continue intermittent dosing. Next random level to be drawn 6/23 @17:30. Re-dose when levels fall below 10 mcg/mL.     Drug levels (last 3 results):  Recent Labs   Lab Result Units 06/22/22  1822   Vancomycin, Random ug/mL 15.6       Pharmacy will continue to follow and monitor vancomycin.    Please contact pharmacy at extension [phone] for questions regarding this assessment.    Thank you for the consult,   Jigar Ravi       Patient brief summary:  Shara Hutchins is a 72 y.o. female initiated on antimicrobial therapy with IV Vancomycin for treatment of urinary tract infection    The patient's current regimen is intermittent dosing     Drug Allergies:   Review of patient's allergies indicates:   Allergen Reactions    Ciprofloxacin Other (See Comments)     Elevated liver enzymes      Pcn [penicillins] Anaphylaxis     TOLERATES ROCEPHIN WITHOUT DIFFICULTY       Actual Body Weight:   66.7 kg    Renal Function:   Estimated Creatinine Clearance: 9.7 mL/min (A) (based on SCr of 5.1 mg/dL (H)).,     Dialysis Method (if applicable):  intermittent HD-MWF    CBC (last 72 hours):  Recent Labs   Lab Result Units 06/21/22  0955 06/22/22  0606   WBC K/uL 12.77* 9.01   Hemoglobin g/dL 11.5* 10.4*   Hematocrit % 36.3* 32.6*   Platelets K/uL 202 167   Gran % % 89.9* 74.7*   Lymph % % 3.4* 16.3*   Mono % % 5.0 7.5   Eosinophil % % 0.2 0.3   Basophil % % 0.2 0.2   Differential Method  Automated Automated       Metabolic Panel (last 72 hours):  Recent Labs   Lab Result Units 06/21/22  0955 06/21/22  1306 06/22/22  0606 06/22/22  1550   Sodium mmol/L 139  --  139  --    Potassium mmol/L 3.3*  --  3.6  --    Chloride mmol/L 105  --  104  --    CO2  mmol/L 25  --  25  --    Glucose mg/dL 128*  --  108  --    Glucose, UA   --  Negative  --  Negative   BUN mg/dL 28*  --  41*  --    Creatinine mg/dL 4.3*  --  5.1*  --    Albumin g/dL 3.5  --  3.2*  --    Total Bilirubin mg/dL 1.2*  --  0.6  --    Alkaline Phosphatase U/L 103  --  98  --    AST U/L 17  --  12  --    ALT U/L 14  --  12  --        Vancomycin Administrations:  vancomycin given in the last 96 hours                     vancomycin (VANCOCIN) 1,500 mg in dextrose 5 % 500 mL IVPB (mg) 1,500 mg New Bag 06/21/22 1929                    Microbiologic Results:  Microbiology Results (last 7 days)       Procedure Component Value Units Date/Time    Urine culture [[id number]] Collected: 06/22/22 1550    Order Status: No result Specimen: Urine Updated: 06/22/22 1708    Urine culture [[id number]]  (Abnormal) Collected: 06/21/22 1306    Order Status: Completed Specimen: Urine Updated: 06/22/22 0654     Urine Culture, Routine GRAM NEGATIVE CRISTHIAN, LACTOSE   >100,000 cfu/ml  Identification and susceptibility pending      Narrative:      Specimen Source->Urine    Blood culture [[id number]] Collected: 06/21/22 1937    Order Status: Completed Specimen: Blood Updated: 06/22/22 0517     Blood Culture, Routine No Growth to date

## 2022-06-23 NOTE — PLAN OF CARE
[facility]  Initial Discharge Assessment       Primary Care Provider: April Horton MD    Admission Diagnosis: Pyelonephritis [N12]    Admission Date: 6/21/2022  Expected Discharge Date:     Discharge Barriers Identified: None     Initial assessment completed with pt. Pt is a resident at [facility]. Pt verbalizes being independent at the facility. Pt is currently on dialysis with [facility] on MWF at 830am. Pt doesn't use any equipment at home. Pt plan to be discharged home to assisted living facility.     Payor: [employer] / Plan: [facility] OPTUM / Product Type: Government /     Extended Emergency Contact Information  Primary Emergency Contact: Stephanie Aguiar   [facility]  Home Phone: [phone]  Relation: Relative   needed? No  Secondary Emergency Contact: carolyndeepikademario  Mobile Phone: [phone]  Relation: Daughter   needed? No    Discharge Plan A: Assisted Living  Discharge Plan B: Assisted Living      [facility] [address]  [address]  [facility] [address]  Phone: [phone] Fax: [phone]    [facility]  [address]  [facility] [address]  Phone: [phone] Fax: [phone]    [facility] - [place] [address] [facility]  [address] [facility] [address]  Phone: [phone] Fax: [phone]    [id number] - [place], [place] - [address]  [address]  [facility] [address]  Phone: [phone] Fax: [phone]    [facility] - [place], [place] - [address]  [address]  [address]  [place] [address]  Phone: [phone] Fax: [phone]      Initial Assessment (most recent)     Adult Discharge Assessment - 06/23/22 1148        Discharge Assessment    Assessment Type Discharge Planning Assessment     Confirmed/corrected address, phone number and insurance Yes     Source of Information  patient     When was your last doctors appointment? 06/09/22     Reason For Admission Pyelonephritis     Lives With facility resident     [facility]ility [facility] From: [facility]     Do you expect to return to your current living situation? Yes     Do you have help at home or someone to help you manage your care at home? Yes     Who are your caregiver(s) and their phone number(s)? Julieta Chang [phone] and facility staff     Prior to hospitilization cognitive status: Alert/Oriented;No Deficits     Current cognitive status: Alert/Oriented;No Deficits     Walking or Climbing Stairs Difficulty none     Dressing/Bathing Difficulty none     Do you have any problems with: Errands/Grocery     Home Accessibility wheelchair accessible     Home Layout Able to live on 1st floor     Equipment Currently Used at Home none     Readmission within 30 days? Yes     Patient currently being followed by outpatient case management? No     Do you currently have service(s) that help you manage your care at home? No     Do you take prescription medications? Yes     Do you have prescription coverage? Yes     Coverage [facility]     Do you have any problems affording any of your prescribed medications? No     Is the patient taking medications as prescribed? yes     Who is going to help you get home at discharge? Julieta Chang [phone]     How do you get to doctors appointments? family or friend will provide     Are you on dialysis? Yes     Dialysis Name and Scheduled days [facility] MWF     Do you take coumadin? No     Discharge Plan A Assisted Living     Discharge Plan B Assisted Living     DME Needed Upon Discharge  none     Discharge Plan discussed with: Patient     Discharge Barriers Identified None        Relationship/Environment    Name(s) of Who Lives With Patient [facility]

## 2022-06-23 NOTE — CARE UPDATE
06/23/22 1516   Patient Assessment/Suction   Level of Consciousness (AVPU) alert   Respiratory Effort Short of breath   Expansion/Accessory Muscles/Retractions no use of accessory muscles   All Lung Fields Breath Sounds diminished   Rhythm/Pattern, Respiratory shortness of breath   Cough Frequency infrequent   Cough Type congested   PRE-TX-O2   O2 Device (Oxygen Therapy) nasal cannula  (PLACED ON NC FROM RA TO INCREASED SATS)   $ Is the patient on Low Flow Oxygen? Yes   Flow (L/min) 2   SpO2 (!) 89 %  (INCREASED TO 96% ON 2L NC)   Pulse Oximetry Type Intermittent   $ Pulse Oximetry - Multiple Charge Pulse Oximetry - Multiple   Pulse 86   Resp 18   Aerosol Therapy   $ Aerosol Therapy Charges Aerosol Treatment   Daily Review of Necessity (SVN) completed   Respiratory Treatment Status (SVN) given   Treatment Route (SVN) mask;oxygen   Patient Position (SVN) HOB elevated   Post Treatment Assessment (SVN) vital signs unchanged;increased aeration   Signs of Intolerance (SVN) none   Education   $ Education Bronchodilator;15 min   Respiratory Evaluation   $ Care Plan Tech Time 15 min   $ Eval/Re-eval Charges Re-evaluation   WILL WEAN O2 AS TOLERATED

## 2022-06-23 NOTE — PROGRESS NOTES
"INPATIENT NEPHROLOGY Progress Note   [facility]    Patient Name: Shara Hutchins  Date: 06/23/2022    Reason for consultation: ESRD    Chief Complaint:   Chief Complaint   Patient presents with    Flank Pain     Right side flank pain onset one hour ago.  Pt on dialysis at [facility] on M,W,F       History of Present Illness:  72 year old female with a history of ESRD on HD MWF, HTN, hypothyroidism, ESBL Klebsiella and chronic diarrhea, s/p 2/3 COVID vaccinations presented to ED complaining of 1 hour history of right sided flank pain: dull ache with sharp stabbing exacerbations waxing and waning by itself (6-9/10). Has known renal lithiasis; is s/p stent placement 01/2022. Was discharged this past week after negative chest pain work-up. In ED: U/A: dirty and sent for cultures. CT Kidney stone reviewed "Moderate right hydronephrosis with perinephric fat stranding and right double-J ureteral stent in place. There are a few gas bubbles in the right renal pelvis and bladder. This could reflect prior manipulation of the urinary tract without being able to exclude a gas-forming infectious process."  Urology has been consulted- will go to OR later today. Consulted for dialysis.    Interval History:  6/22- VSS, waiting urologic procedure  6/23- had R ureteral stent exchange yest, no issues with HD- got off 2L    Plan of Care:    Assessment:  R obstructive uropathy with acute pyelonephritis s/p ureteral stent exchange on 6/22  ESRD on HD MWF  HTN  Hypokalemia  SHPT  Anemia of CKD    Plan:    - continue HD MWF  - continue UF to dry weight  - continue home BP meds  - will adjust dialysate for metabolic derangements  - continue binder  - continue ADAM with HD    Thank you for allowing us to participate in this patient's care. We will continue to follow.    Vital Signs:  Temp Readings from Last 3 Encounters:   06/23/22 98.4 °F (36.9 °C) (Oral)   06/18/22 97.5 °F (36.4 °C) (Oral)   04/30/22 97.5 °F (36.4 °C) (Oral) "       Pulse Readings from Last 3 Encounters:   06/23/22 74   06/18/22 96   05/10/22 84       BP Readings from Last 3 Encounters:   06/23/22 (!) 175/84   06/18/22 (!) 145/77   05/10/22 128/60       Weight:  Wt Readings from Last 3 Encounters:   06/21/22 66.7 kg (147 lb 0.8 oz)   06/18/22 65.5 kg (144 lb 8.2 oz)   06/17/22 66.7 kg (147 lb)         Medications:  Scheduled Meds:   diltiaZEM  120 mg Oral Daily    [START ON 6/24/2022] epoetin nica-ebpx (RETACRIT) injection  50 Units/kg Intravenous Every Mon, Wed, Fri    gabapentin  100 mg Oral BID    heparin (porcine)  5,000 Units Subcutaneous Q8H    hydrALAZINE  50 mg Oral Q8H    levothyroxine  88 mcg Oral Before breakfast    LORazepam  0.5 mg Oral QHS    meropenem (MERREM) IVPB  500 mg Intravenous Q12H    mupirocin   Nasal BID    rOPINIRole  0.5 mg Oral Q8H    sertraline  50 mg Oral QHS    sevelamer carbonate  800 mg Oral TID WM     Continuous Infusions:  PRN Meds:.acetaminophen, albuterol-ipratropium, melatonin, morphine, morphine, ondansetron, promethazine, Pharmacy to dose Vancomycin consult **AND** vancomycin - pharmacy to dose  Current Facility-Administered Medications on File Prior to Encounter   Medication Dose Route Frequency Provider Last Rate Last Admin    electrolyte-S (ISOLYTE)   Intravenous Continuous Raz Ruff MD        lactated ringers infusion   Intravenous Continuous Ruy Beltre MD        LIDOcaine (PF) 10 mg/ml (1%) injection 5 mg  0.5 mL Intradermal Once Raz Ruff MD         Current Outpatient Medications on File Prior to Encounter   Medication Sig Dispense Refill    albuterol-ipratropium (DUO-NEB) 2.5 mg-0.5 mg/3 mL nebulizer solution Take 3 mLs by nebulization every 6 (six) hours as needed for Wheezing or Shortness of Breath. Rescue 75 mL 0    diltiaZEM (CARDIZEM CD) 120 MG Cp24 Take 1 capsule (120 mg total) by mouth once daily. 90 capsule 3    ferric citrate (AURYXIA) 210 mg iron Tab Take 2 tablets by mouth 3  (three) times daily with meals.      gabapentin (NEURONTIN) 100 MG capsule Take 100 mg by mouth 2 (two) times daily. HCS      hydrALAZINE (APRESOLINE) 50 MG tablet Take 1 tablet (50 mg total) by mouth every 8 (eight) hours. 270 tablet 0    levothyroxine (SYNTHROID) 88 MCG tablet TAKE 1 TABLET BEFORE BREAKFAST (Patient taking differently: Take 88 mcg by mouth before breakfast. TAKE 1 TABLET BEFORE BREAKFAST) 90 tablet 3    LORazepam (ATIVAN) 0.5 MG tablet Take 1 tablet (0.5 mg total) by mouth every evening. 90 tablet 1    predniSONE (DELTASONE) 10 MG tablet Take 3 tablets (30 mg total) by mouth once daily. for 4 days 12 tablet 0    promethazine (PHENERGAN) 25 MG tablet TAKE 1 TABLET(25 MG) BY MOUTH TWICE DAILY AS NEEDED FOR NAUSEA (Patient taking differently: Take 25 mg by mouth 2 (two) times daily as needed for Nausea.) 60 tablet 0    rOPINIRole (REQUIP) 0.5 MG tablet Take 0.5 mg by mouth every 8 (eight) hours.      sertraline (ZOLOFT) 50 MG tablet Take 1 tablet (50 mg total) by mouth every evening. Take 50 mg by mouth every evening. 90 tablet 1    traMADoL (ULTRAM) 50 mg tablet TAKE ONE TABLET (50mg total) BY MOUTH EVERY 6 HOURS AS NEEDED FOR PAIN (Patient taking differently: Take 50 mg by mouth every 6 (six) hours as needed.) 30 tablet 0    [DISCONTINUED] miconazole (MICOTIN) 2 % cream Apply topically 2 (two) times daily. 28 g 0       Review of Systems:  Neg    Physical Exam:  Constitutional: nad, aao x 3  Heart: rrr, no m/r/g, wwp, no edema  Lungs: ctab, no w/r/r/c, no lb  Abdomen: s/nt/nd, +BS      Results:  Lab Results   Component Value Date     06/22/2022    K 3.6 06/22/2022     06/22/2022    CO2 25 06/22/2022    BUN 41 (H) 06/22/2022    CREATININE 5.1 (H) 06/22/2022    CALCIUM 8.3 (L) 06/22/2022    ANIONGAP 10 06/22/2022    ESTGFRAFRICA 9.1 (A) 06/22/2022    EGFRNONAA 7.9 (A) 06/22/2022       Lab Results   Component Value Date    CALCIUM 8.3 (L) 06/22/2022    PHOS 4.0 06/18/2022        Recent Labs   Lab 06/23/22  0545   WBC 8.81   RBC 3.05*   HGB 11.0*   HCT 35.0*      *   MCH 36.1*   MCHC 31.4*       I have personally reviewed pertinent radiological imaging and reports.    I have spent > 35 minutes providing care for this patient for the above diagnoses. These services have included chart/data/imaging review, evaluation, exam, formulation of plan, , note preparation, and discussions with staff involved in this patient's care.    Yasmin Parker MD MPH  [facility]  [address] [place] [id number]  [phone] (p)  [phone] (f)

## 2022-06-24 NOTE — PROGRESS NOTES
"INPATIENT NEPHROLOGY Progress Note   [facility]    Patient Name: Shara Hutchins  Date: 06/24/2022    Reason for consultation: ESRD    Chief Complaint:   Chief Complaint   Patient presents with    Flank Pain     Right side flank pain onset one hour ago.  Pt on dialysis at [facility] on M,W,F       History of Present Illness:  72 year old female with a history of ESRD on HD MWF, HTN, hypothyroidism, ESBL Klebsiella and chronic diarrhea, s/p 2/3 COVID vaccinations presented to ED complaining of 1 hour history of right sided flank pain: dull ache with sharp stabbing exacerbations waxing and waning by itself (6-9/10). Has known renal lithiasis; is s/p stent placement 01/2022. Was discharged this past week after negative chest pain work-up. In ED: U/A: dirty and sent for cultures. CT Kidney stone reviewed "Moderate right hydronephrosis with perinephric fat stranding and right double-J ureteral stent in place. There are a few gas bubbles in the right renal pelvis and bladder. This could reflect prior manipulation of the urinary tract without being able to exclude a gas-forming infectious process."  Urology has been consulted- will go to OR later today. Consulted for dialysis.    Interval History:  6/22- VSS, waiting urologic procedure  6/23- had R ureteral stent exchange yest, no issues with HD- got off 2L  6/24- VSS, on 2L NC, UOP 1.3L- seen on HD, lee out, gave ok for midline    Plan of Care:    Assessment:  R obstructive uropathy with acute pyelonephritis s/p ureteral stent exchange on 6/22  ESRD on HD MWF  HTN  Hypokalemia  SHPT  Anemia of CKD    Plan:    - midline in for outpatient IV antbx  - continue HD MWF  - continue UF to dry weight  - continue home BP meds  - will adjust dialysate for metabolic derangements  - continue binder  - continue [facility] with HD    Thank you for allowing us to participate in this patient's care. We will continue to follow.    Vital Signs:  Temp Readings from Last 3 " Encounters:   06/24/22 99.1 °F (37.3 °C) (Oral)   06/18/22 97.5 °F (36.4 °C) (Oral)   04/30/22 97.5 °F (36.4 °C) (Oral)       Pulse Readings from Last 3 Encounters:   06/24/22 83   06/18/22 96   05/10/22 84       BP Readings from Last 3 Encounters:   06/24/22 125/71   06/18/22 (!) 145/77   05/10/22 128/60       Weight:  Wt Readings from Last 3 Encounters:   06/21/22 66.7 kg (147 lb 0.8 oz)   06/18/22 65.5 kg (144 lb 8.2 oz)   06/17/22 66.7 kg (147 lb)         Medications:  Scheduled Meds:   diltiaZEM  120 mg Oral Daily    epoetin nica-ebpx (RETACRIT) injection  50 Units/kg Intravenous Every Mon, Wed, Fri    gabapentin  100 mg Oral BID    heparin (porcine)  5,000 Units Subcutaneous Q8H    hydrALAZINE  50 mg Oral Q8H    Lactobacillus acidoph-L.bulgar  4 tablet Oral BID WM    levothyroxine  88 mcg Oral Before breakfast    LORazepam  0.5 mg Oral QHS    meropenem (MERREM) IVPB  500 mg Intravenous Q12H    mupirocin   Nasal BID    rOPINIRole  0.5 mg Oral Q8H    sertraline  50 mg Oral QHS    sevelamer carbonate  800 mg Oral TID WM     Continuous Infusions:  PRN Meds:.acetaminophen, albuterol-ipratropium, melatonin, morphine, morphine, ondansetron, promethazine  Current Facility-Administered Medications on File Prior to Encounter   Medication Dose Route Frequency Provider Last Rate Last Admin    electrolyte-S (ISOLYTE)   Intravenous Continuous Raz Ruff MD        lactated ringers infusion   Intravenous Continuous Ruy Beltre MD        LIDOcaine (PF) 10 mg/ml (1%) injection 5 mg  0.5 mL Intradermal Once Raz Ruff MD         Current Outpatient Medications on File Prior to Encounter   Medication Sig Dispense Refill    albuterol-ipratropium (DUO-NEB) 2.5 mg-0.5 mg/3 mL nebulizer solution Take 3 mLs by nebulization every 6 (six) hours as needed for Wheezing or Shortness of Breath. Rescue 75 mL 0    diltiaZEM (CARDIZEM CD) 120 MG Cp24 Take 1 capsule (120 mg total) by mouth once daily. 90  capsule 3    ferric citrate (AURYXIA) 210 mg iron Tab Take 2 tablets by mouth 3 (three) times daily with meals.      gabapentin (NEURONTIN) 100 MG capsule Take 100 mg by mouth 2 (two) times daily. HCS      hydrALAZINE (APRESOLINE) 50 MG tablet Take 1 tablet (50 mg total) by mouth every 8 (eight) hours. 270 tablet 0    levothyroxine (SYNTHROID) 88 MCG tablet TAKE 1 TABLET BEFORE BREAKFAST (Patient taking differently: Take 88 mcg by mouth before breakfast. TAKE 1 TABLET BEFORE BREAKFAST) 90 tablet 3    LORazepam (ATIVAN) 0.5 MG tablet Take 1 tablet (0.5 mg total) by mouth every evening. 90 tablet 1    [] predniSONE (DELTASONE) 10 MG tablet Take 3 tablets (30 mg total) by mouth once daily. for 4 days 12 tablet 0    promethazine (PHENERGAN) 25 MG tablet TAKE 1 TABLET(25 MG) BY MOUTH TWICE DAILY AS NEEDED FOR NAUSEA (Patient taking differently: Take 25 mg by mouth 2 (two) times daily as needed for Nausea.) 60 tablet 0    rOPINIRole (REQUIP) 0.5 MG tablet Take 0.5 mg by mouth every 8 (eight) hours.      sertraline (ZOLOFT) 50 MG tablet Take 1 tablet (50 mg total) by mouth every evening. Take 50 mg by mouth every evening. 90 tablet 1    traMADoL (ULTRAM) 50 mg tablet TAKE ONE TABLET (50mg total) BY MOUTH EVERY 6 HOURS AS NEEDED FOR PAIN (Patient taking differently: Take 50 mg by mouth every 6 (six) hours as needed.) 30 tablet 0    [DISCONTINUED] miconazole (MICOTIN) 2 % cream Apply topically 2 (two) times daily. 28 g 0       Review of Systems:  Neg    Physical Exam:  Constitutional: nad, aao x 3  Heart: rrr, no m/r/g, wwp, no edema  Lungs: ctab, no w/r/r/c, no lb  Abdomen: s/nt/nd, +BS      Results:  Lab Results   Component Value Date     2022    K 3.6 2022     2022    CO2 25 2022    BUN 41 (H) 2022    CREATININE 5.1 (H) 2022    CALCIUM 8.3 (L) 2022    ANIONGAP 10 2022    ESTGFRAFRICA 9.1 (A) 2022    EGFRNONAA 7.9 (A) 2022       Lab  Results   Component Value Date    CALCIUM 8.3 (L) 06/22/2022    PHOS 4.0 06/18/2022       Recent Labs   Lab 06/24/22  0426   WBC 13.08*   RBC 3.23*   HGB 11.2*   HCT 36.3*   *   *   MCH 34.7*   MCHC 30.9*       I have personally reviewed pertinent radiological imaging and reports.    I have spent > 35 minutes providing care for this patient for the above diagnoses. These services have included chart/data/imaging review, evaluation, exam, formulation of plan, , note preparation, and discussions with staff involved in this patient's care.    Yasmin Parker MD MPH  [facility] [address] [address]  [phone] (p)  [phone] (f)

## 2022-06-24 NOTE — PROGRESS NOTES
[facility] Urology Consult Progress Note:    Shara Hutchins is a 72 y.o. female who presents for recurrent right pyelonephritis.     Patient with a history of HTN, vulvar cancer s/p chemo/XRT in 2013 and CKD who presented to the [facility] emergency department on 2/23/20 with abdominal pain, vaginal bleeding, DARCI  (3.8 from 2.5 baseline) and a 20 pound weight loss. Lee was placed on admission to monitor UOP. CTRSS 2/12 2/23 showed bilateral nonobstructing renal stones.      She underwent loop ileostomy on 2/13/20 for chronic diarrhea. Renal ultrasound on 2/26 showed no hydro, b renal stones and indeterminate artifact. She went to [facility] on 3/7 and returned and stated gross hematuria started after this. Prior to admission pt denied having gross hematuria, urinary frequency, vaginal bleeding or frequent uti's. She did however state that her vulvar itching had returned and she was concerned about recurrence of cancer with no recent f/u by gyn onc. She hasd seen by  inpt and plan was for outpt f/u with .      She had been awaiting LTAC placement on 3/13. Consulted initially for persistent hematuria. The plan was for outpt cysto to eval for any bladder path not seen on ct and discussion of renal stones as well as discharging with lee. Her hematuria continued, ucx on 3/15 grew Klebsiella and she was started on rocephin and completed a 5d course. Around 3/17  she was given a voiding trial vs lee exchange? on 3/17 for larger 18fr.       She continued to have hematuria requiring intermittent irrigation and 2 transfusions. Urology was re-consulted 3/24, Dr. Garcia asked them to remove the 18 Fr, place a 22 [place] 3 way. CBI weaned off. She underwent ileostomy reversal on 5/18/20. Discharged from the hospital on 5/23/20.        Interval History     6/10/20: Patient presents today with hematuria for one day. She was prescribed Pyridium and Macrodantin for presumed UTI.       6/16/20:  Patient presented today [facility] ED on 6/14/20 with worsening gross hematuria. Urology consulted to assist with management. States she became light-headed and dizzy at home due to anemia.      6/25/20: She is s/p cystoscopy with fulguration on 6/16/20. Placed on CBI overnight for monitoring. Urine remained clear and patient discharged from [facility]. She presents today with her lee in place and clear urine.Here for voiding trial.      3/16/21: Patient presented to [facility] ED on 3/15/21 complaining of a 2 days history of right flank pain associated with chills and nausea. CT abdomen/pelvis without contrast revealed several distal right UVJ stones - largest measuring 8 mm with associated moderate right hydroureteronephrosis. Also with bilateral non-obstructing nephrolithiasis. Urine culture with gram negative rods.      4/20/21: She is s/p right ureteroscopy with laser lithotripsy and stent exchange on 3/30/21. Several impacted stones identified and lasered. Second look-ureteroscopy today.      5/13/21: She underwent second look ureteroscopy on 4/20/21. Stent removed in clinic today at bedside. Patient with chronic diarrhea. Stones are calcium oxalate monohydrate.      5/18/21: Patient presented to the ED yesterday with fever, chills and recurrent right flank pain after stent removal. CT renal stone with recurrent hydroureteronephrosis. Underwent right urgent stent placement and removal of several bladder stones. Known right ureteral stricture. Planned for follow up with stent exchange at a later date, but did not follow up.      1/18/22: Patient admitted at [facility] for vulvar cellulitis. CT abdomen pelvis revealed severe right hydroureteronephrosis with the stent in place and a 6 mm mid-ureteral stone. Urology consulted to assist with management. No fever or chills. Urine culture negative. Remains on dialysis.    6/24/22:  She underwent cystoscopy and right ureteral stent exchange on 1/18/22 - 7 x 24 cm JJ stent. Discharged in stable  condition and has since to [facility] on 6/21/22 with right flank pain and UA concerning for infection. 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  She is now status post right stent exchange with Dr. Garcia on 06/22/2022.  Stent found to be encrusted.  Urine culture with Klebsiella.  No complaints today.     She denies any bone pain, weight loss or  trauma.        Urine culture  No growth                    1/18/22  Multi orgs                    4/13/21  GNRs                          3/15/21  E. Coli                          12/8/20  Multiple organisms      12/9/20  No growth                    6/16/20  Enterobacter               6/14/20  E. Coli                          6/3/20  Enterococcus              5/21/20  Pseudomonas             3/24/20                 Urine cytology  Negative                      6/16/20    Past Medical History:   Diagnosis Date    Cellulitis of trunk     Perineum, numerous episodes    Chronic diarrhea 02/10/2020    Short-gut syndrome    Diabetes mellitus, type 2     pt has not had for years after losing weight    End stage renal disease on dialysis 01/07/2018    Fatigue     Hypertension     Hypothyroidism 2/10/2020    Iron deficiency anemia due to chronic blood loss 1/7/2018    Pulmonary hypertension 3/20/2020    Vulvar cancer 1/7/2019       Past Surgical History:   Procedure Laterality Date    ABDOMINAL SURGERY      ANGIOGRAPHY OF ARTERIOVENOUS SHUNT N/A 1/19/2022    Procedure: Fistulogram with Possible Intervention;  Surgeon: Raz Maher MD;  Location: [facility] CATH/EP LAB;  Service: Cardiothoracic;  Laterality: N/A;    APPENDECTOMY      AV FISTULA PLACEMENT Left 2018    BACK SURGERY      BLADDER FULGURATION N/A 6/16/2020    Procedure: FULGURATION, BLADDER;  Surgeon: Damari Barber Jr., MD;  Location: [facility] OR;  Service: Urology;  Laterality: N/A;    carpel tunnel surgery once on each hand      CERVICAL FUSION      x 4     cervix repair      CHOLECYSTECTOMY  2000    COLECTOMY      CYSTOSCOPY W/ RETROGRADES  6/16/2020    Procedure: CYSTOSCOPY, WITH RETROGRADE PYELOGRAM;  Surgeon: Damari Barber Jr., MD;  Location: [facility] OR;  Service: Urology;;    CYSTOSCOPY W/ RETROGRADES Right 4/20/2021    Procedure: CYSTOSCOPY, WITH RETROGRADE PYELOGRAM;  Surgeon: Damari Barber Jr., MD;  Location: [facility] OR;  Service: Urology;  Laterality: Right;    CYSTOSCOPY W/ URETERAL STENT PLACEMENT Right 3/16/2021    Procedure: CYSTOSCOPY, WITH URETERAL STENT INSERTION;  Surgeon: Damari Barber Jr., MD;  Location: [facility] OR;  Service: Urology;  Laterality: Right;    CYSTOSCOPY W/ URETERAL STENT PLACEMENT Right 5/18/2021    Procedure: CYSTOSCOPY, WITH URETERAL STENT INSERTION;  Surgeon: Damari Barber Jr., MD;  Location: [facility] OR;  Service: Urology;  Laterality: Right;    CYSTOSCOPY W/ URETERAL STENT PLACEMENT Right 6/22/2022    Procedure: CYSTOSCOPY, WITH URETERAL STENT INSERTION;  Surgeon: Gaby Garcia MD;  Location: [facility] OR;  Service: Urology;  Laterality: Right;  cysto stent exchange    CYSTOSCOPY W/ URETERAL STENT REMOVAL Right 4/20/2021    Procedure: CYSTOSCOPY, WITH URETERAL STENT REMOVAL;  Surgeon: Damari Barber Jr., MD;  Location: [facility] OR;  Service: Urology;  Laterality: Right;    CYSTOSCOPY WITH URETEROSCOPY, RETROGRADE PYELOGRAPHY, AND INSERTION OF STENT Right 3/30/2021    Procedure: CYSTOSCOPY, WITH RETROGRADE PYELOGRAM AND URETERAL STENT INSERTION;  Surgeon: Damari Barber Jr., MD;  Location: [facility] OR;  Service: Urology;  Laterality: Right;    CYSTOURETEROSCOPY WITH RETROGRADE PYELOGRAPHY AND INSERTION OF STENT INTO URETER Right 1/18/2022    Procedure: CYSTOURETEROSCOPY, WITH RETROGRADE PYELOGRAM AND URETERAL STENT INSERTION;  Surgeon: Damari Barber Jr., MD;  Location: [facility];  Service: Urology;  Laterality: Right;    DIAGNOSTIC LAPAROSCOPY N/A 2/13/2020    Procedure: LAPAROSCOPY, DIAGNOSTIC;  Surgeon: Robbi Lovell III, MD;   Location: [facility] OR;  Service: General;  Laterality: N/A;    HYSTERECTOMY  1980    ILEOSTOMY N/A 2/13/2020    Procedure: CREATION, ILEOSTOMY Loop;  Surgeon: Robbi Lovell III, MD;  Location: [facility] OR;  Service: General;  Laterality: N/A;    ILEOSTOMY CLOSURE N/A 5/18/2020    Procedure: CLOSURE, ILEOSTOMY;  Surgeon: Robbi Lovell III, MD;  Location: [facility] OR;  Service: General;  Laterality: N/A;    LASER LITHOTRIPSY Right 3/30/2021    Procedure: LITHOTRIPSY, USING LASER;  Surgeon: Damari Barber Jr., MD;  Location: [facility] OR;  Service: Urology;  Laterality: Right;    LUMBAR LAMINECTOMY      LYSIS OF ADHESIONS N/A 2/13/2020    Procedure: LYSIS, ADHESIONS;  Surgeon: Robbi Lovell III, MD;  Location: [facility] OR;  Service: General;  Laterality: N/A;    NECK SURGERY      PERCUTANEOUS TRANSLUMINAL ANGIOPLASTY OF ARTERIOVENOUS FISTULA N/A 1/19/2022    Procedure: PTA, AV FISTULA;  Surgeon: Raz Maher MD;  Location: [facility] CATH/EP LAB;  Service: Cardiothoracic;  Laterality: N/A;    PHLEBOGRAPHY  2/28/2020    Procedure: VENOGRAM;  Surgeon: Robbi Lovell III, MD;  Location: [facility] OR;  Service: General;;    REMOVAL OF VASCULAR ACCESS PORT Right 2/13/2020    Procedure: REMOVAL, VASCULAR ACCESS PORT;  Surgeon: Robbi Lovell III, MD;  Location: [facility] OR;  Service: General;  Laterality: Right;    RETROGRADE PYELOGRAPHY  3/16/2021    Procedure: PYELOGRAM, RETROGRADE;  Surgeon: Damari Barber Jr., MD;  Location: [facility] OR;  Service: Urology;;    RETROGRADE PYELOGRAPHY Right 5/18/2021    Procedure: PYELOGRAM, RETROGRADE;  Surgeon: Damari Barber Jr., MD;  Location: [facility] OR;  Service: Urology;  Laterality: Right;    RETROGRADE PYELOGRAPHY  6/22/2022    Procedure: PYELOGRAM, RETROGRADE;  Surgeon: Gaby Garcia MD;  Location: [facility] OR;  Service: Urology;;    SHOULDER SURGERY      URETEROSCOPIC REMOVAL OF URETERIC CALCULUS Right 3/30/2021    Procedure: REMOVAL, CALCULUS, URETER, URETEROSCOPIC;   Surgeon: Damari Barber Jr., MD;  Location: [facility];  Service: Urology;  Laterality: Right;    URETEROSCOPIC REMOVAL OF URETERIC CALCULUS Right 4/20/2021    Procedure: REMOVAL, CALCULUS, URETER, URETEROSCOPIC;  Surgeon: Damari Barber Jr., MD;  Location: [facility] OR;  Service: Urology;  Laterality: Right;    vulva cancer         Review of patient's allergies indicates:   Allergen Reactions    Ciprofloxacin Other (See Comments)     Elevated liver enzymes      Pcn [penicillins] Anaphylaxis     TOLERATES ROCEPHIN WITHOUT DIFFICULTY       Medications Reviewed: see MAR      FOCUSED PHYSICAL EXAM:    Vitals:    06/24/22 0712   BP: 125/71   Pulse: 83   Resp: 18   Temp: 99.1 °F (37.3 °C)     Body mass index is 23.03 kg/m².       General: Alert, cooperative, no distress, appears stated age  Abdomen: Soft, non-tender, no CVA tenderness, non-distended      LABS:    Lab Results   Component Value Date    WBC 13.08 (H) 06/24/2022    HGB 11.2 (L) 06/24/2022    HCT 36.3 (L) 06/24/2022     (H) 06/24/2022     (L) 06/24/2022       BMP  Lab Results   Component Value Date     06/22/2022    K 3.6 06/22/2022     06/22/2022    CO2 25 06/22/2022    BUN 41 (H) 06/22/2022    CREATININE 5.1 (H) 06/22/2022    CALCIUM 8.3 (L) 06/22/2022    ANIONGAP 10 06/22/2022    ESTGFRAFRICA 9.1 (A) 06/22/2022    EGFRNONAA 7.9 (A) 06/22/2022             Assessment/Diagnosis:    1. Right emphysematous pyelitis.   2. Bilateral nephrolithiasis    Plans:    - Extensive discussion with patient regarding the etiology and management of her right emphysematous pyelitis.  She is now status post right stent exchange on 06/22/2022 with Dr. Garcia.  White count slightly today, but she is otherwise doing well.  She will be scheduled for outpatient follow-up in 2-3 weeks to discuss definitive management of her right ureteral stent and nephrolithiasis.  We discussed that she would likely benefit from repeat right ureteroscopy to attempt to  clear out her stone burden and get patient stent free.

## 2022-06-24 NOTE — PROGRESS NOTES
[facility] Medicine Progress Note    Subjective:      Underwent cysto with Dr. Garcia today. No apparent perioperative complications.   Pain well controlled.   UOP ok.     :  No major issues overnight. Afebrile.      Objective:   Last 24 Hour Vital Signs:  BP  Min: 111/62  Max: 175/84  Temp  Av.4 °F (36.9 °C)  Min: 97.8 °F (36.6 °C)  Max: 99.5 °F (37.5 °C)  Pulse  Av.9  Min: 74  Max: 86  Resp  Av.8  Min: 16  Max: 18  SpO2  Av.6 %  Min: 89 %  Max: 98 %  I/O last 3 completed shifts:  In: 2465 [P.O.:1365; I.V.:100; Other:500; IV Piggyback:500]  Out: 3490 [Urine:990; Other:2500]    Physical Examination:  Physical Exam  Vitals and nursing note reviewed.   Constitutional:       Appearance: She is well-developed.   HENT:      Head: Normocephalic and atraumatic.      Right Ear: External ear normal.      Left Ear: External ear normal.      Nose: Nose normal.   Eyes:      Conjunctiva/sclera: Conjunctivae normal.      Pupils: Pupils are equal, round, and reactive to light.   Cardiovascular:      Rate and Rhythm: Normal rate and regular rhythm.      Heart sounds: Normal heart sounds.   Pulmonary:      Effort: Pulmonary effort is normal.      Breath sounds: Normal breath sounds.   Abdominal:      General: Bowel sounds are normal.      Palpations: Abdomen is soft.   Genitourinary:     Comments: Right flank pain to percussion  Musculoskeletal:         General: Normal range of motion.      Cervical back: Normal range of motion and neck supple.   Skin:     General: Skin is warm and dry.      Capillary Refill: Capillary refill takes less than 2 seconds.      Comments: Ecchymoses various stages arms   Neurological:      Mental Status: She is alert and oriented to person, place, and time.   Psychiatric:         Behavior: Behavior normal.         Thought Content: Thought content normal.         Judgment: Judgment normal.      Laboratory:  Laboratory Data Reviewed: yes    Most Recent Data:  CBC:   Lab  Results   Component Value Date    WBC 8.81 06/23/2022    HGB 11.0 (L) 06/23/2022    HCT 35.0 (L) 06/23/2022     06/23/2022     (H) 06/23/2022    RDW 13.7 06/23/2022     BMP:   Lab Results   Component Value Date     06/22/2022    K 3.6 06/22/2022     06/22/2022    CO2 25 06/22/2022    BUN 41 (H) 06/22/2022     06/22/2022    CALCIUM 8.3 (L) 06/22/2022    MG 2.0 06/18/2022    PHOS 4.0 06/18/2022     LFTs:   Lab Results   Component Value Date    PROT 5.6 (L) 06/22/2022    ALBUMIN 3.2 (L) 06/22/2022    BILITOT 0.6 06/22/2022    AST 12 06/22/2022    ALKPHOS 98 06/22/2022    ALT 12 06/22/2022     Coags:   Lab Results   Component Value Date    INR 1.4 09/15/2021     FLP:   Lab Results   Component Value Date    CHOL 95 (L) 05/25/2021    HDL 57 05/25/2021    LDLCALC 23.4 (L) 05/25/2021    TRIG 73 05/25/2021    CHOLHDL 60.0 (H) 05/25/2021     DM:   Lab Results   Component Value Date    HGBA1C 4.6 05/25/2021    HGBA1C SEE COMMENT 01/22/2020    HGBA1C Invalid (A) 11/15/2019    LDLCALC 23.4 (L) 05/25/2021    CREATININE 5.1 (H) 06/22/2022     Thyroid:   Lab Results   Component Value Date    TSH 2.160 06/22/2022    FREET4 0.74 06/16/2022    K0UHOSX 7.3 02/24/2020    K4MMPQZ 44 (L) 02/24/2020     Anemia:   Lab Results   Component Value Date    IRON 87 08/28/2020    IRON 87 08/28/2020    TIBC 269 08/28/2020    FERRITIN 378 (H) 12/08/2020    NLRASDUT23 507 06/17/2022    FOLATE 13.4 06/17/2022     Cardiac:   Lab Results   Component Value Date    TROPONINI <0.030 06/17/2022     (H) 06/16/2022     Urinalysis:   Lab Results   Component Value Date    LABURIN No growth to date 06/22/2022    COLORU Yellow 06/22/2022    SPECGRAV 1.010 06/22/2022    NITRITE Negative 06/22/2022    KETONESU Negative 06/22/2022    UROBILINOGEN Negative 06/22/2022    WBCUA >100 (H) 06/22/2022        Radiology:  Data Reviewed: yes  CT RENAL STONE STUDY ABD PELVIS WO  FL FLOURO USAGE      XR CHEST 1 VIEW      Current  "Medications:     Infusions:       Scheduled:   diltiaZEM  120 mg Oral Daily    [START ON 6/24/2022] epoetin nica-ebpx (RETACRIT) injection  50 Units/kg Intravenous Every Mon, Wed, Fri    gabapentin  100 mg Oral BID    heparin (porcine)  5,000 Units Subcutaneous Q8H    hydrALAZINE  50 mg Oral Q8H    Lactobacillus acidoph-L.bulgar  4 tablet Oral BID WM    levothyroxine  88 mcg Oral Before breakfast    LORazepam  0.5 mg Oral QHS    meropenem (MERREM) IVPB  500 mg Intravenous Q12H    mupirocin   Nasal BID    rOPINIRole  0.5 mg Oral Q8H    sertraline  50 mg Oral QHS    sevelamer carbonate  800 mg Oral TID WM        PRN:  acetaminophen, albuterol-ipratropium, melatonin, morphine, morphine, ondansetron, promethazine    Lines and Day Number of Therapy:      Microbiology Data:  Reviewed: yes  Microbiology Results (last 7 days)     Procedure Component Value Units Date/Time    Urine culture [[id number]] Collected: 06/22/22 1550    Order Status: Completed Specimen: Urine Updated: 06/23/22 0646     Urine Culture, Routine No growth to date    Narrative:      Specimen Source->Urine    Urine culture [[id number]]  (Abnormal)  (Susceptibility) Collected: 06/21/22 1306    Order Status: Completed Specimen: Urine Updated: 06/23/22 0626     Urine Culture, Routine KLEBSIELLA OXYTOCA ESBL  >100,000 cfu/ml  Known ESBL patient      Narrative:      Specimen Source->Urine    Blood culture [[id number]] Collected: 06/21/22 1937    Order Status: Completed Specimen: Blood Updated: 06/23/22 0232     Blood Culture, Routine No Growth to date      No Growth to date           Antibiotics and Day Number of Therapy:  Antibiotics (From admission, onward)            Start     Stop Route Frequency Ordered    06/22/22 1015  mupirocin 2 % ointment         06/27 0859 Nasl 2 times daily 06/22/22 0906    06/22/22 0100  meropenem-0.9% sodium chloride 500 mg/50 mL IVPB        Note to Pharmacy: Ht: 5' 7" (1.702 m)  Wt: 66.7 kg (147 lb)  Estimated " Creatinine Clearance: 11.5 mL/min (A) (based on SCr of 4.3 mg/dL (H)).   Body mass index is 23.02 kg/m².    -- IV Every 12 hours (non-standard times) 06/21/22 2014         Antivirals (From admission, onward)    None             Assessment/Plan:     Shara Hutchins is a 72 y.o.female with     ESRD on dialysis MWF  Nephrology for RRT      Nephrolithiasis  S/p cystoscopy per urology       Right-sided pyelonephritis  ID; broad spectrum ABx refining as species identified           Porfirio Hurst  [facility]

## 2022-06-24 NOTE — PROGRESS NOTES
Consent and Hep b status verified, removed 2000 uf net, no issues with access, post thrill and bruit noted, patient stable throughout treatment. Report given to LAINA Rivera      06/24/22 1330   Handoff Report   Received From Adrienne   Given To Nicole   Vital Signs   Temp 98.1 °F (36.7 °C)   Temp src Oral   Pulse 77   Heart Rate Source Monitor   Resp 18   SpO2 96 %   Pulse Oximetry Type Intermittent   Oximetry Probe Site Applied   Flow (L/min) 2   O2 Device (Oxygen Therapy) nasal cannula   /67   BP Location Right leg   BP Method Automatic   Patient Position Lying   Assessments (Pre/Post)   Consent Obtained yes   Safety vein preservation armband present   Date Hepatitis Profile Obtained 01/17/22   Blood Liters Processed (BLP) 52   Transport Modality bed   Level of Consciousness (AVPU) alert   Dialyzer Clearance mildly streaked   Pain   Preferred Pain Scale number (Numeric Rating Pain Scale)   Comfort/Acceptable Pain Level 0   Pain Management Interventions quiet environment facilitated   Pain/Comfort Interventions   Fever Reduction/Comfort Measures lightweight bedding;lightweight clothing   Pre-Hemodialysis Assessment   Additional Dialysis Information Needed Yes   Patient Status Departed   Treatment Consent Verified Yes   Treatment Status Completed        Hemodialysis AV Fistula Left upper arm   No Placement Date or Time found.   Present Prior to Hospital Arrival?: Yes  Location: Left upper arm   Site Assessment Intact;Dry;Clean   Patency Present;Thrill;Bruit   Status Deaccessed   Dressing Intervention First dressing   Dressing Status Clean;Dry;Intact   Site Condition No complications   Post-Hemodialysis Assessment   Rinseback Volume (mL) 250 mL   Blood Volume Processed (Liters) 52 L   Dialyzer Clearance Lightly streaked   Duration of Treatment 180 minutes   Additional Fluid Intake (mL) 500 mL   Total UF (mL) 2500 mL   Net Fluid Removal 2000   Patient Response to Treatment federica   Post-Treatment Weight 64.7 kg (142  lb 10.2 oz)   Treatment Weight Change -2   Arterial bleeding stop time (min) 5 min   Venous bleeding stop time (min) 5 min   Post-Hemodialysis Comments stable

## 2022-06-24 NOTE — PHYSICIAN QUERY
"PT Name: Shara Hutchins  MR #: [id number]     DOCUMENTATION CLARIFICATION      CDS/: Nathaly Natanaelpj               Contact information: [phone]  This form is a permanent document in the medical record.     Query Date: June 24, 2022    By submitting this query, we are merely seeking further clarification of documentation to reflect the severity of illness of your patient.   Please utilize your independent clinical judgment when addressing the question(s) below.    The Medical Record contains the following:    Indicators   Location in Medical Record   Risk factors; 71yo female presents w/ R-sided flank pain x1 hr, still makes urine, known nephrolithiasis of R kidney, had stent placed R-ureter in Jan 2022.    Hx Urine culture with Klebsiella ESBL. She was treated for this from voided urine culture 4/23x a week or more.    ED        ID   Clinical indicators;  "Will exchange stent since acutely infected (or place a new one adjacent to the one in there if unable to remove the stent and then defer to  to remove after infection treated)."    Moderate right hydronephrosis with perinephric fat stranding and right double-J ureteral stent in place. There are a few gas bubbles in the right renal pelvis and bladder.    WBC 12.77,   UA cloudy, LE 3+, WBC >100, many bacteria  UC Klebsiella ESBL     Urology consult           CT scan          Labs    Interventions;  Urology consult    "Procedure 1. Cystourethroscopy  2. Right retrograde pyelogram  3. Right ureteral stent exchange, 7 x 24 JJ stent"    Continue merrem 500 mg daily by IV, can she have a midline? For 14d     MD orders    Op Rpt 06/22        Recommendations ID [id number]      Dear doctor please clarify if the UTI was 2nd to the R ureteral stent placed in January 2022.      [ x  ] UTI 2nd to R ureteral stent placed in January 2022.     [   ] UTI is unrelated to the R ureteral stent placed in January 2022.     [   ] Other (please specify)         Please document in " your progress notes daily for the duration of treatment until resolved, and include in your discharge summary.

## 2022-06-24 NOTE — CONSULTS
SARAH spoke with patient and she is agreeable to self administering IV antibiotics. SARAH spoke with Clifford at [facility] IV infusion. Flynn with [facility] coming to teach patient on infusion and flushing Midline. SARAH contacted Dr Neal to discuss plan. Dr Neal approved plan for patient to come to [facility] suite for midline drsg changes and any weekly labs. [facility] will also remove midline at completion of treatment. SARAH contacted Rajwinder at [facility] to notify of patient anticipating discharge possibly tomorrow with self administering IV antibiotics. Rajwinder agrees to accept patient back at [facility] with Midline in place.

## 2022-06-24 NOTE — PROGRESS NOTES
[facility] Progress Note    Subjective:      Underwent cysto with Dr. Garcia today. No apparent perioperative complications.   Pain well controlled.   UOP ok.      Objective:   Last 24 Hour Vital Signs:  BP  Min: 111/62  Max: 175/84  Temp  Av.4 °F (36.9 °C)  Min: 97.8 °F (36.6 °C)  Max: 99.5 °F (37.5 °C)  Pulse  Av.9  Min: 74  Max: 86  Resp  Av.8  Min: 16  Max: 18  SpO2  Av.6 %  Min: 89 %  Max: 98 %  I/O last 3 completed shifts:  In: 2465 [P.O.:1365; I.V.:100; Other:500; IV Piggyback:500]  Out: 3490 [Urine:990; Other:2500]    Physical Examination:  Physical Exam  Vitals and nursing note reviewed.   Constitutional:       Appearance: She is well-developed.   HENT:      Head: Normocephalic and atraumatic.      Right Ear: External ear normal.      Left Ear: External ear normal.      Nose: Nose normal.   Eyes:      Conjunctiva/sclera: Conjunctivae normal.      Pupils: Pupils are equal, round, and reactive to light.   Cardiovascular:      Rate and Rhythm: Normal rate and regular rhythm.      Heart sounds: Normal heart sounds.   Pulmonary:      Effort: Pulmonary effort is normal.      Breath sounds: Normal breath sounds.   Abdominal:      General: Bowel sounds are normal.      Palpations: Abdomen is soft.   Genitourinary:     Comments: Right flank pain to percussion  Musculoskeletal:         General: Normal range of motion.      Cervical back: Normal range of motion and neck supple.   Skin:     General: Skin is warm and dry.      Capillary Refill: Capillary refill takes less than 2 seconds.      Comments: Ecchymoses various stages arms   Neurological:      Mental Status: She is alert and oriented to person, place, and time.   Psychiatric:         Behavior: Behavior normal.         Thought Content: Thought content normal.         Judgment: Judgment normal.      Laboratory:  Laboratory Data Reviewed: yes    Most Recent Data:  CBC:   Lab Results   Component Value Date    WBC 8.81  06/23/2022    HGB 11.0 (L) 06/23/2022    HCT 35.0 (L) 06/23/2022     06/23/2022     (H) 06/23/2022    RDW 13.7 06/23/2022     BMP:   Lab Results   Component Value Date     06/22/2022    K 3.6 06/22/2022     06/22/2022    CO2 25 06/22/2022    BUN 41 (H) 06/22/2022     06/22/2022    CALCIUM 8.3 (L) 06/22/2022    MG 2.0 06/18/2022    PHOS 4.0 06/18/2022     LFTs:   Lab Results   Component Value Date    PROT 5.6 (L) 06/22/2022    ALBUMIN 3.2 (L) 06/22/2022    BILITOT 0.6 06/22/2022    AST 12 06/22/2022    ALKPHOS 98 06/22/2022    ALT 12 06/22/2022     Coags:   Lab Results   Component Value Date    INR 1.4 09/15/2021     FLP:   Lab Results   Component Value Date    CHOL 95 (L) 05/25/2021    HDL 57 05/25/2021    LDLCALC 23.4 (L) 05/25/2021    TRIG 73 05/25/2021    CHOLHDL 60.0 (H) 05/25/2021     DM:   Lab Results   Component Value Date    HGBA1C 4.6 05/25/2021    HGBA1C SEE COMMENT 01/22/2020    HGBA1C Invalid (A) 11/15/2019    LDLCALC 23.4 (L) 05/25/2021    CREATININE 5.1 (H) 06/22/2022     Thyroid:   Lab Results   Component Value Date    TSH 2.160 06/22/2022    FREET4 0.74 06/16/2022    X7OMJKL 7.3 02/24/2020    O2VDZSW 44 (L) 02/24/2020     Anemia:   Lab Results   Component Value Date    IRON 87 08/28/2020    IRON 87 08/28/2020    TIBC 269 08/28/2020    FERRITIN 378 (H) 12/08/2020    MWVHVJBC64 507 06/17/2022    FOLATE 13.4 06/17/2022     Cardiac:   Lab Results   Component Value Date    TROPONINI <0.030 06/17/2022     (H) 06/16/2022     Urinalysis:   Lab Results   Component Value Date    LABURIN No growth to date 06/22/2022    COLORU Yellow 06/22/2022    SPECGRAV 1.010 06/22/2022    NITRITE Negative 06/22/2022    KETONESU Negative 06/22/2022    UROBILINOGEN Negative 06/22/2022    WBCUA >100 (H) 06/22/2022        Radiology:  Data Reviewed: yes  CT RENAL STONE STUDY ABD PELVIS WO  FL FLOURO USAGE      XR CHEST 1 VIEW      Current Medications:     Infusions:       Scheduled:    "diltiaZEM  120 mg Oral Daily    [START ON 6/24/2022] epoetin nica-ebpx (RETACRIT) injection  50 Units/kg Intravenous Every Mon, Wed, Fri    gabapentin  100 mg Oral BID    heparin (porcine)  5,000 Units Subcutaneous Q8H    hydrALAZINE  50 mg Oral Q8H    Lactobacillus acidoph-L.bulgar  4 tablet Oral BID WM    levothyroxine  88 mcg Oral Before breakfast    LORazepam  0.5 mg Oral QHS    meropenem (MERREM) IVPB  500 mg Intravenous Q12H    mupirocin   Nasal BID    rOPINIRole  0.5 mg Oral Q8H    sertraline  50 mg Oral QHS    sevelamer carbonate  800 mg Oral TID WM        PRN:  acetaminophen, albuterol-ipratropium, melatonin, morphine, morphine, ondansetron, promethazine    Lines and Day Number of Therapy:      Microbiology Data:  Reviewed: yes  Microbiology Results (last 7 days)     Procedure Component Value Units Date/Time    Urine culture [[id number]] Collected: 06/22/22 1550    Order Status: Completed Specimen: Urine Updated: 06/23/22 0646     Urine Culture, Routine No growth to date    Narrative:      Specimen Source->Urine    Urine culture [[id number]]  (Abnormal)  (Susceptibility) Collected: 06/21/22 1306    Order Status: Completed Specimen: Urine Updated: 06/23/22 0626     Urine Culture, Routine KLEBSIELLA OXYTOCA ESBL  >100,000 cfu/ml  Known ESBL patient      Narrative:      Specimen Source->Urine    Blood culture [[id number]] Collected: 06/21/22 1937    Order Status: Completed Specimen: Blood Updated: 06/23/22 0232     Blood Culture, Routine No Growth to date      No Growth to date           Antibiotics and Day Number of Therapy:  Antibiotics (From admission, onward)            Start     Stop Route Frequency Ordered    06/22/22 1015  mupirocin 2 % ointment         06/27 0859 Nasl 2 times daily 06/22/22 0906    06/22/22 0100  meropenem-0.9% sodium chloride 500 mg/50 mL IVPB        Note to Pharmacy: Ht: 5' 7" (1.702 m)  Wt: 66.7 kg (147 lb)  Estimated Creatinine Clearance: 11.5 mL/min (A) (based on SCr of " 4.3 mg/dL (H)).   Body mass index is 23.02 kg/m².    -- IV Every 12 hours (non-standard times) 06/21/22 2014         Antivirals (From admission, onward)    None             Assessment/Plan:     Shara Hutchins is a 72 y.o.female with     ESRD on dialysis MWF  Nephrology for RRT      Nephrolithiasis  S/p cystoscopy per urology       Right-sided pyelonephritis  ID; broad spectrum ABx refining as species identified           Porfirio Hurst  [facility]

## 2022-06-24 NOTE — PROGRESS NOTES
6/21/22:  Reviewed chart and case with Dr. Olson  Hydronephrosis definitely worse, then last imaging in April. Stent last changed in January.  She is on prednisone for recent COPD exacerbation    I have increased the dose of the merrem in the short term and if she is no longer wheezing, I would stop the prednisone.   Will see in person tomorrow  I believe she needs cysto and exchange of stent    6/22/22:  Consult Note  Infectious Disease    Reason for Consult:  Right sided pyelonephritis    HPI: Shara Hutchins is a 72 y.o. female known to me from multiple prior hospitalizations, presented to the ED yesterday with acute onset, severe, right flank pain c/w passing a kidney stone. In the ED she had a CTRSS which demonstrated worsening hydronephrosis compared to 4/28 and air in the renal pelvis. UA was abnormal with >100 WBC obtained by catheterization. She had no fever, but did have leukocytosis. she had nausea and vomiting from the pain. 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  She was recently discharged, 6/19 on steroids for right sided chest/pleuritic pain. Urine culture is growing a presumptive Ecoli.     6/23: interim reviewed. Urology assistance appreciated. Urine culture with Klebsiella ESBL. She was treated for this from voided urine culture 4/23x a week or more. Has sore throat from being intubated. Eating ok. Right chest sounds congested today. Obtained CXR with no new infiltrates. She is ambulatory. Discussed plan for home IV antibiotics.   6/24:  Afebrile, has tremendous nausea today.  While she has a history of chronic diarrhea, this occurred 2 admissions ago when she was quite full of stool.  I obtained a KUB and indeed her colon is very full.  She still has some loose rhonchi on the right but her chest x-ray was clear yesterday.  She will need a cathartic today    EXAM & DIAGNOSTICS REVIEWED:   Vitals:     Temp:  [98.2 °F (36.8  °C)-99.3 °F (37.4 °C)]   Temp: 98.5 °F (36.9 °C) (06/24/22 1020)  Pulse: 77 (06/24/22 1330)  Resp: 18 (06/24/22 1020)  BP: 121/67 (06/24/22 1330)  SpO2: 97 % (06/24/22 1020)    Intake/Output Summary (Last 24 hours) at 6/24/2022 1351  Last data filed at 6/24/2022 0800  Gross per 24 hour   Intake 290 ml   Output 600 ml   Net -310 ml       General:  In NAD. Alert and attentive, cooperative, uncomfortable from nausea  Eyes:  Anicteric, EOMI  ENT:  No ulcers, exudates, thrush, nares patent,    Neck:  supple, no masses or adenopathy appreciated  Lungs: Crackles RLL  Heart:  RRR, no gallop/murmur/rub noted  Abd:  Soft, NT, ND, normal BS, no masses or organomegaly appreciated.  :  Voids/ and vulvar swelling and erythema is at her baseline  Musc:  Joints without effusion, swelling, erythema, synovitis, muscle wasting.   Skin:  No rashes  She does have clubbing  Neuro:             Alert, attentive, speech fluent, face symmetric, moves all extremities, no focal weakness. Ambulatory  Psych:  MISERABLE cooperative     Extrem: No edema, erythema, phlebitis, cellulitis, warm and well perfused  VAD:  Right arm is AVF for dialysis     Isolation:  contact  Wound:            General Labs reviewed:  Recent Labs   Lab 06/22/22  0606 06/22/22  1526 06/23/22  0545 06/24/22  0426   WBC 9.01  --  8.81 13.08*   HGB 10.4*  --  11.0* 11.2*   HCT 32.6* 35* 35.0* 36.3*     --  156 148*       Recent Labs   Lab 06/18/22  0314 06/21/22  0955 06/22/22  0606    139 139   K 4.0 3.3* 3.6    105 104   CO2 27 25 25   BUN 23 28* 41*   CREATININE 3.9* 4.3* 5.1*   CALCIUM 8.5* 8.6* 8.3*   PROT  --  6.5 5.6*   BILITOT  --  1.2* 0.6   ALKPHOS  --  103 98   ALT  --  14 12   AST  --  17 12     No results for input(s): CRP in the last 168 hours.      Micro:  Microbiology Results (last 7 days)     Procedure Component Value Units Date/Time    Urine culture [[id number]]  (Abnormal) Collected: 06/22/22 1550    Order Status: Completed Specimen:  Urine Updated: 06/24/22 0722     Urine Culture, Routine KLEBSIELLA OXYTOCA ESBL  10,000 - 49,999 cfu/ml  For susceptibility see order #[id number]      Narrative:      Specimen Source->Urine    Blood culture [[id number]] Collected: 06/21/22 1937    Order Status: Completed Specimen: Blood Updated: 06/24/22 0232     Blood Culture, Routine No Growth to date      No Growth to date      No Growth to date    Urine culture [[id number]]  (Abnormal)  (Susceptibility) Collected: 06/21/22 1306    Order Status: Completed Specimen: Urine Updated: 06/23/22 0626     Urine Culture, Routine KLEBSIELLA OXYTOCA ESBL  >100,000 cfu/ml  Known ESBL patient      Narrative:      Specimen Source->Urine          Imaging Reviewed:   CXR   CTRSS      Cardiology:    IMPRESSION & PLAN   1. Right sided pyelonephritis with worsening hydronephrosis despite stent   Klebsiella ESBL on culture (previously treated)    2. On steroids for recent pulmonary symptoms, no wheezing, stopped  3. ESRD on dialysis  4. 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  Nausea due to constipation      Recommendations:  Continue merrem 500 mg q12 while here and at home daily by IV,    Midline ok per nephrology  Unsure if  means urocit or citrate like vitamin D(procedure report)  May discharge when home IV set up/approved  She can come to see me in the office July 6 or 7 at noon depending on her dialysis schedule    Medical Decision Making during this encounter was  [_] Low Complexity  [_] Moderate Complexity  [xx  ] High Complexity

## 2022-06-24 NOTE — CARE UPDATE
Prn tx available   06/24/22 0800   Patient Assessment/Suction   Level of Consciousness (AVPU) alert   Respiratory Effort Normal   Expansion/Accessory Muscles/Retractions no use of accessory muscles;expansion symmetric   All Lung Fields Breath Sounds clear;diminished   Rhythm/Pattern, Respiratory no shortness of breath reported;unlabored;depth regular   PRE-TX-O2   O2 Device (Oxygen Therapy) nasal cannula   Flow (L/min) 2   SpO2 (!) 94 %   Pulse Oximetry Type Intermittent   $ Pulse Oximetry - Multiple Charge Pulse Oximetry - Multiple   Aerosol Therapy   $ Aerosol Therapy Charges PRN treatment not required   Education   $ Education DME Oxygen;15 min   Respiratory Evaluation   $ Care Plan Tech Time 15 min   $ Eval/Re-eval Charges Re-evaluation

## 2022-06-25 NOTE — PROGRESS NOTES
"INPATIENT NEPHROLOGY Progress Note   [facility]    Patient Name: Shara Hutchins  Date: 06/25/2022    Reason for consultation: ESRD    Chief Complaint:   Chief Complaint   Patient presents with    Flank Pain     Right side flank pain onset one hour ago.  Pt on dialysis at [facility] on M,W,F       History of Present Illness:  72 year old female with a history of ESRD on HD MWF, HTN, hypothyroidism, ESBL Klebsiella and chronic diarrhea, s/p 2/3 COVID vaccinations presented to ED complaining of 1 hour history of right sided flank pain: dull ache with sharp stabbing exacerbations waxing and waning by itself (6-9/10). Has known renal lithiasis; is s/p stent placement 01/2022. Was discharged this past week after negative chest pain work-up. In ED: U/A: dirty and sent for cultures. CT Kidney stone reviewed "Moderate right hydronephrosis with perinephric fat stranding and right double-J ureteral stent in place. There are a few gas bubbles in the right renal pelvis and bladder. This could reflect prior manipulation of the urinary tract without being able to exclude a gas-forming infectious process."  Urology has been consulted- will go to OR later today. Consulted for dialysis.    Interval History:  6/22- VSS, waiting urologic procedure  6/23- had R ureteral stent exchange yest, no issues with HD- got off 2L  6/24- VSS, on 2L NC, UOP 1.3L- seen on HD, lee out, gave ok for midline  6/25- spoke with Dr. Barber- cannot use urocit in dialysis patient but they will start another medication called litholytes as an outpatient to prevent stent encrustation    Plan of Care:    Assessment:  R obstructive uropathy with acute pyelonephritis s/p ureteral stent exchange on 6/22  ESRD on HD MWF  HTN  Hypokalemia  SHPT  Anemia of CKD    Plan:    - midline in for outpatient IV antbx  - continue HD MWF  - continue UF to dry weight  - continue home BP meds  - will adjust dialysate for metabolic derangements  - continue " binder  - continue ADAM with HD    Ordered labs for Monday.    Thank you for allowing us to participate in this patient's care. We will continue to follow.    Vital Signs:  Temp Readings from Last 3 Encounters:   06/25/22 98.5 °F (36.9 °C) (Oral)   06/18/22 97.5 °F (36.4 °C) (Oral)   04/30/22 97.5 °F (36.4 °C) (Oral)       Pulse Readings from Last 3 Encounters:   06/25/22 75   06/18/22 96   05/10/22 84       BP Readings from Last 3 Encounters:   06/25/22 123/75   06/18/22 (!) 145/77   05/10/22 128/60       Weight:  Wt Readings from Last 3 Encounters:   06/25/22 66.7 kg (147 lb 0.8 oz)   06/18/22 65.5 kg (144 lb 8.2 oz)   06/17/22 66.7 kg (147 lb)         Medications:  Scheduled Meds:   diltiaZEM  120 mg Oral Daily    epoetin nica-ebpx (RETACRIT) injection  50 Units/kg Intravenous Every Mon, Wed, Fri    gabapentin  100 mg Oral BID    heparin (porcine)  5,000 Units Subcutaneous Q8H    hydrALAZINE  50 mg Oral Q8H    Lactobacillus acidoph-L.bulgar  4 tablet Oral BID WM    lactulose  20 g Oral BID    levothyroxine  88 mcg Oral Before breakfast    LORazepam  0.5 mg Oral QHS    meropenem (MERREM) IVPB  500 mg Intravenous Q12H    mupirocin   Nasal BID    rOPINIRole  0.5 mg Oral Q8H    sertraline  50 mg Oral QHS    sevelamer carbonate  800 mg Oral TID WM     Continuous Infusions:  PRN Meds:.acetaminophen, albuterol-ipratropium, bisacodyL, melatonin, metoclopramide HCl, morphine, morphine, ondansetron, promethazine  Current Facility-Administered Medications on File Prior to Encounter   Medication Dose Route Frequency Provider Last Rate Last Admin    electrolyte-S (ISOLYTE)   Intravenous Continuous Raz Ruff MD        lactated ringers infusion   Intravenous Continuous Ruy Beltre MD        LIDOcaine (PF) 10 mg/ml (1%) injection 5 mg  0.5 mL Intradermal Once Raz Ruff MD         Current Outpatient Medications on File Prior to Encounter   Medication Sig Dispense Refill     albuterol-ipratropium (DUO-NEB) 2.5 mg-0.5 mg/3 mL nebulizer solution Take 3 mLs by nebulization every 6 (six) hours as needed for Wheezing or Shortness of Breath. Rescue 75 mL 0    diltiaZEM (CARDIZEM CD) 120 MG Cp24 Take 1 capsule (120 mg total) by mouth once daily. 90 capsule 3    ferric citrate (AURYXIA) 210 mg iron Tab Take 2 tablets by mouth 3 (three) times daily with meals.      gabapentin (NEURONTIN) 100 MG capsule Take 100 mg by mouth 2 (two) times daily. HCS      hydrALAZINE (APRESOLINE) 50 MG tablet Take 1 tablet (50 mg total) by mouth every 8 (eight) hours. 270 tablet 0    levothyroxine (SYNTHROID) 88 MCG tablet TAKE 1 TABLET BEFORE BREAKFAST (Patient taking differently: Take 88 mcg by mouth before breakfast. TAKE 1 TABLET BEFORE BREAKFAST) 90 tablet 3    LORazepam (ATIVAN) 0.5 MG tablet Take 1 tablet (0.5 mg total) by mouth every evening. 90 tablet 1    promethazine (PHENERGAN) 25 MG tablet TAKE 1 TABLET(25 MG) BY MOUTH TWICE DAILY AS NEEDED FOR NAUSEA (Patient taking differently: Take 25 mg by mouth 2 (two) times daily as needed for Nausea.) 60 tablet 0    rOPINIRole (REQUIP) 0.5 MG tablet Take 0.5 mg by mouth every 8 (eight) hours.      sertraline (ZOLOFT) 50 MG tablet Take 1 tablet (50 mg total) by mouth every evening. Take 50 mg by mouth every evening. 90 tablet 1    traMADoL (ULTRAM) 50 mg tablet TAKE ONE TABLET (50mg total) BY MOUTH EVERY 6 HOURS AS NEEDED FOR PAIN (Patient taking differently: Take 50 mg by mouth every 6 (six) hours as needed.) 30 tablet 0    [DISCONTINUED] miconazole (MICOTIN) 2 % cream Apply topically 2 (two) times daily. 28 g 0       Review of Systems:  Neg    Physical Exam:  Constitutional: nad, aao x 3  Heart: rrr, no m/r/g, wwp, no edema  Lungs: ctab, no w/r/r/c, no lb  Abdomen: s/nt/nd, +BS      Results:  Lab Results   Component Value Date     06/22/2022    K 3.6 06/22/2022     06/22/2022    CO2 25 06/22/2022    BUN 41 (H) 06/22/2022    CREATININE 5.1  (H) 06/22/2022    CALCIUM 8.3 (L) 06/22/2022    ANIONGAP 10 06/22/2022    ESTGFRAFRICA 9.1 (A) 06/22/2022    EGFRNONAA 7.9 (A) 06/22/2022       Lab Results   Component Value Date    CALCIUM 8.3 (L) 06/22/2022    PHOS 4.0 06/18/2022       Recent Labs   Lab 06/25/22  0510   WBC 13.01*   RBC 3.02*   HGB 10.8*   HCT 34.4*   *   *   MCH 35.8*   MCHC 31.4*       I have personally reviewed pertinent radiological imaging and reports.    I have spent > 35 minutes providing care for this patient for the above diagnoses. These services have included chart/data/imaging review, evaluation, exam, formulation of plan, , note preparation, and discussions with staff involved in this patient's care.    Yasmin Parker MD MPH  [facility] [address] [address]  [phone] (p)  [phone] (f)

## 2022-06-25 NOTE — PLAN OF CARE
Patient cleared for discharge from case management. Patient to discharge home back to [facility].       06/25/22 1404   Final Note   Assessment Type Final Discharge Note   Anticipated Discharge Disposition Home   What phone number can be called within the next 1-3 days to see how you are doing after discharge? [phone]

## 2022-06-25 NOTE — CARE UPDATE
06/25/22 0800   Patient Assessment/Suction   Level of Consciousness (AVPU) alert   Respiratory Effort Normal;Unlabored   Expansion/Accessory Muscles/Retractions no use of accessory muscles;no retractions   All Lung Fields Breath Sounds clear   Rhythm/Pattern, Respiratory unlabored;pattern regular;depth regular;chest wiggle adequate;no shortness of breath reported   Cough Frequency no cough   PRE-TX-O2   O2 Device (Oxygen Therapy) room air   $ Is the patient on Low Flow Oxygen? Yes   SpO2 (!) 94 %   Pulse Oximetry Type Intermittent   $ Pulse Oximetry - Multiple Charge Pulse Oximetry - Multiple   Pulse 75   Resp 17   Aerosol Therapy   $ Aerosol Therapy Charges PRN treatment not required   Education   $ Education Bronchodilator;15 min   Respiratory Evaluation   $ Care Plan Tech Time 15 min

## 2022-06-25 NOTE — PLAN OF CARE
met with patient at bedside to explain IMM. Patient verbalized her understanding of the IMM and signed. IMM scanned into media.       06/25/22 1547   Medicare Message   Important Message from Medicare regarding Discharge Appeal Rights Signed/date by patient/caregiver;Explained to patient/caregiver   Date IMM was signed 06/25/22   Time IMM was signed [id number]

## 2022-06-25 NOTE — PROGRESS NOTES
6/21/22:  Reviewed chart and case with Dr. Olson  Hydronephrosis definitely worse, then last imaging in April. Stent last changed in January.  She is on prednisone for recent COPD exacerbation    I have increased the dose of the merrem in the short term and if she is no longer wheezing, I would stop the prednisone.   Will see in person tomorrow  I believe she needs cysto and exchange of stent    6/22/22:  Consult Note  Infectious Disease    Reason for Consult:  Right sided pyelonephritis    HPI: Shara Hutchins is a 72 y.o. female known to me from multiple prior hospitalizations, presented to the ED yesterday with acute onset, severe, right flank pain c/w passing a kidney stone. In the ED she had a CTRSS which demonstrated worsening hydronephrosis compared to 4/28 and air in the renal pelvis. UA was abnormal with >100 WBC obtained by catheterization. She had no fever, but did have leukocytosis. she had nausea and vomiting from the pain. 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  She was recently discharged, 6/19 on steroids for right sided chest/pleuritic pain. Urine culture is growing a presumptive Ecoli.     6/23: interim reviewed. Urology assistance appreciated. Urine culture with Klebsiella ESBL. She was treated for this from voided urine culture 4/23x a week or more. Has sore throat from being intubated. Eating ok. Right chest sounds congested today. Obtained CXR with no new infiltrates. She is ambulatory. Discussed plan for home IV antibiotics.   6/24:  Afebrile, has tremendous nausea today.  While she has a history of chronic diarrhea, this occurred 2 admissions ago when she was quite full of stool.  I obtained a KUB and indeed her colon is very full.  She still has some loose rhonchi on the right but her chest x-ray was clear yesterday.  She will need a cathartic today  6/25:  Afebrile. She did achieve some success with cathartic. She does  have miralax at home and agreed to take it for a few days. She has been instructed in home IV administration and is ok for discharge.     EXAM & DIAGNOSTICS REVIEWED:   Vitals:     Temp:  [98.1 °F (36.7 °C)-98.6 °F (37 °C)]   Temp: 98.5 °F (36.9 °C) (06/25/22 0729)  Pulse: 75 (06/25/22 0800)  Resp: 17 (06/25/22 0800)  BP: 123/75 (06/25/22 0746)  SpO2: (!) 94 % (06/25/22 0800)    Intake/Output Summary (Last 24 hours) at 6/25/2022 0859  Last data filed at 6/25/2022 0600  Gross per 24 hour   Intake 790 ml   Output 2700 ml   Net -1910 ml       General:  In NAD.arousable/sleepy and attentive, cooperative,    Eyes:  Anicteric, EOMI  ENT:  No ulcers, exudates, thrush, nares patent,    Neck:  supple, no masses or adenopathy appreciated  Lungs: Crackles RLL  Heart:  RRR, no gallop/murmur/rub noted  Abd:  Soft, NT, ND, normal BS, no masses or organomegaly appreciated.  :  Voids/ and vulvar swelling and erythema is at her baseline  Musc:  Joints without effusion, swelling, erythema, synovitis, muscle wasting.   Skin:  No rashes  She does have clubbing  Neuro:    attentive, speech fluent, face symmetric, moves all extremities, no focal weakness. Ambulatory  Psych:  Calm, cooperative     Extrem: No edema, erythema, phlebitis, cellulitis, warm and well perfused  VAD:  Right arm is AVF for dialysis     Isolation:  contact  Wound:            General Labs reviewed:  Recent Labs   Lab 06/23/22  0545 06/24/22  0426 06/25/22  0510   WBC 8.81 13.08* 13.01*   HGB 11.0* 11.2* 10.8*   HCT 35.0* 36.3* 34.4*    148* 141*       Recent Labs   Lab 06/21/22  0955 06/22/22  0606    139   K 3.3* 3.6    104   CO2 25 25   BUN 28* 41*   CREATININE 4.3* 5.1*   CALCIUM 8.6* 8.3*   PROT 6.5 5.6*   BILITOT 1.2* 0.6   ALKPHOS 103 98   ALT 14 12   AST 17 12     No results for input(s): CRP in the last 168 hours.      Micro:  Microbiology Results (last 7 days)     Procedure Component Value Units Date/Time    Blood culture [[id number]]  Collected: 06/21/22 1937    Order Status: Completed Specimen: Blood Updated: 06/25/22 0232     Blood Culture, Routine No Growth to date      No Growth to date      No Growth to date      No Growth to date    Urine culture [[id number]]  (Abnormal) Collected: 06/22/22 1550    Order Status: Completed Specimen: Urine Updated: 06/24/22 0722     Urine Culture, Routine KLEBSIELLA OXYTOCA ESBL  10,000 - 49,999 cfu/ml  For susceptibility see order #[id number]      Narrative:      Specimen Source->Urine    Urine culture [[id number]]  (Abnormal)  (Susceptibility) Collected: 06/21/22 1306    Order Status: Completed Specimen: Urine Updated: 06/23/22 0626     Urine Culture, Routine KLEBSIELLA OXYTOCA ESBL  >100,000 cfu/ml  Known ESBL patient      Narrative:      Specimen Source->Urine          Imaging Reviewed:   CXRs   CTRSS  KUB 6/24    Cardiology:    IMPRESSION & PLAN   1. Right sided pyelonephritis with worsening hydronephrosis despite stent   Klebsiella ESBL on culture (previously treated)    2. On steroids for recent pulmonary symptoms, no wheezing, stopped  3. ESRD on dialysis  4. 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  Nausea due to constipation      Recommendations:  Continue merrem 500 mg   at home Q 24 in the afternoon by IV,       Unsure if  means urocit or citrate like vitamin D(procedure report)  May discharge    She can come to see me in the office July 6 or 7 at noon depending on her dialysis schedule    Medical Decision Making during this encounter was  [_] Low Complexity  [_] Moderate Complexity  [xx  ] High Complexity

## 2022-06-25 NOTE — PROGRESS NOTES
"[facility]  Adult Nutrition   Progress Note (Initial Assessment)     SUMMARY     Recommendations  Recommendation/Intervention: 1. Continue diet as tolerated by patient 2.  to assist with meal selections daily  Goals: 1. Patient to meet at least 75% of estimated needs via oral intake of meals  Nutrition Goal Status: new    Dietitian Rounds Brief    Pt assessed 2' LOS. Chart reviewed. Noted s/p cystoscopy. Noted fair oral intake per I/Os. LBM 6/23. Weight relatively stable per EMR weight history.     Reason for Assessment  Reason For Assessment: length of stay  Diagnosis: renal disease (R sided pyelonephritis)  Relevant Medical History: COPD, ESRD on HD, HTN, hypothyroidism  Interdisciplinary Rounds: did not attend    Nutrition Risk Screen  Nutrition Risk Screen: no indicators present     MST Score: 0  Have you recently lost weight without trying?: No  Weight loss score: 0  Have you been eating poorly because of a decreased appetite?: No  Appetite score: 0       Nutrition/Diet History  Food Allergies: NKFA  Factors Affecting Nutritional Intake: None identified at this time    Anthropometrics  Temp: 98.5 °F (36.9 °C)  Height Method: Stated  Height: 5' 7" (170.2 cm)  Height (inches): 67 in  Weight Method: Bed Scale  Weight: 66.7 kg (147 lb 0.8 oz)  Weight (lb): 147.05 lb  Ideal Body Weight (IBW), Female: 135 lb  % Ideal Body Weight, Female (lb): 108.93 %  BMI (Calculated): 23  BMI Grade: 18.5-24.9 - normal       Weight History:  Wt Readings from Last 10 Encounters:   06/25/22 66.7 kg (147 lb 0.8 oz)   06/18/22 65.5 kg (144 lb 8.2 oz)   06/17/22 66.7 kg (147 lb)   05/10/22 66.2 kg (146 lb)   04/25/22 68.3 kg (150 lb 9.2 oz)   02/08/22 64.4 kg (142 lb)   01/18/22 68.3 kg (150 lb 9.2 oz)   09/15/21 63 kg (139 lb)   05/24/21 63 kg (139 lb)   05/19/21 66.7 kg (147 lb 0.8 oz)       Lab/Procedures/Meds: Pertinent Labs Reviewed    Clinical Chemistry:  Recent Labs   Lab 06/22/22  0606      K 3.6   CL " 104   CO2 25      BUN 41*   CREATININE 5.1*   CALCIUM 8.3*   PROT 5.6*   ALBUMIN 3.2*   BILITOT 0.6   ALKPHOS 98   AST 12   ALT 12   ANIONGAP 10   ESTGFRAFRICA 9.1*   EGFRNONAA 7.9*       CBC:   Recent Labs   Lab 06/25/22  0510   WBC 13.01*   RBC 3.02*   HGB 10.8*   HCT 34.4*   *   *   MCH 35.8*   MCHC 31.4*       Thyroid & Parathyroid:  Recent Labs   Lab 06/22/22  0606   TSH 2.160         Medications: Pertinent Medications reviewed    Scheduled Meds:   diltiaZEM  120 mg Oral Daily    epoetin nica-ebpx (RETACRIT) injection  50 Units/kg Intravenous Every Mon, Wed, Fri    gabapentin  100 mg Oral BID    heparin (porcine)  5,000 Units Subcutaneous Q8H    hydrALAZINE  50 mg Oral Q8H    Lactobacillus acidoph-L.bulgar  4 tablet Oral BID WM    lactulose  20 g Oral BID    levothyroxine  88 mcg Oral Before breakfast    LORazepam  0.5 mg Oral QHS    meropenem (MERREM) IVPB  500 mg Intravenous Q12H    mupirocin   Nasal BID    rOPINIRole  0.5 mg Oral Q8H    sertraline  50 mg Oral QHS    sevelamer carbonate  800 mg Oral TID WM       Continuous Infusions:    PRN Meds:.acetaminophen, albuterol-ipratropium, bisacodyL, melatonin, metoclopramide HCl, morphine, morphine, ondansetron, promethazine    Estimated/Assessed Needs  Weight Used For Calorie Calculations: 66.7 kg (147 lb 0.8 oz)  Energy Calorie Requirements (kcal): 3772-7931 kcals (25-30 kcal/kg)  Energy Need Method: Kcal/kg  Protein Requirements: 80-86 g (1.2-1.3 g/kg) 2' HD  Weight Used For Protein Calculations: 66.7 kg (147 lb 0.8 oz)  Fluid Requirements (mL): 1000 ml + UOP  Estimated Fluid Requirement Method: other (see comments)  RDA Method (mL): 1667       Nutrition Prescription Ordered  Current Diet Order: Regular    Evaluation of Received Nutrient/Fluid Intake  Energy Calories Required: meeting needs  Protein Required: meeting needs  Fluid Required: meeting needs  Tolerance: tolerating     Intake/Output Summary (Last 24 hours) at  6/25/2022 0904  Last data filed at 6/25/2022 0600  Gross per 24 hour   Intake 790 ml   Output 2700 ml   Net -1910 ml        % Meal Intake: 50 - 75 %    Nutrition Risk  Level of Risk/Frequency of Follow-up: moderate     Monitor and Evaluation  Food and Nutrient Intake: energy intake, food and beverage intake  Food and Nutrient Adminstration: diet order  Physical Activity and Function: nutrition-related ADLs and IADLs  Anthropometric Measurements: weight, weight change, body mass index  Biochemical Data, Medical Tests and Procedures: electrolyte and renal panel, gastrointestinal profile, glucose/endocrine profile  Nutrition-Focused Physical Findings: overall appearance     Nutrition Follow-Up  RD Follow-up?: Yes    Altagracia Mcdonough RD 06/25/2022 9:04 AM

## 2022-06-28 PROBLEM — U07.1 COVID: Status: ACTIVE | Noted: 2022-01-01

## 2022-06-28 NOTE — PHARMACY MED REC
"Admission Medication History     The home medication history was taken by Elizabeth Cooney CPhT.    Medication history obtained from [facility]    You may go to "Admission" then "Reconcile Home Medications" tabs to review and/or act upon these items.      The home medication list has been updated by the Pharmacy department.    Please read ALL comments highlighted in yellow.    Please address this information as you see fit.     Feel free to contact us if you have any questions or require assistance.        Elizabeth Cooney CPhT.  [phone]      .          "

## 2022-06-28 NOTE — ED PROVIDER NOTES
"Encounter Date: 6/28/2022    SCRIBE #1 NOTE: I, Tatiana Hutchins, am scribing for, and in the presence of, Markos Keller MD.       History     Chief Complaint   Patient presents with    Cough     X 2 days - sent from [facility] for evaluation     Nasal Congestion     Time seen by provider: 5:20 PM on 06/28/2022    Shara Hutchins is a 72 y.o. female with a PMHx of cellulitis of trunk, chronic diarrhea, T2DM, pulmonary HTN, ESRD on dialysis (MWF), iron deficiency anemia, vulvar cancer, and hypothyroidism arriving via EMS from [facility] for a medical evaluation. Patient was recently discharged from [facility] on 6/25/22 after she was found to have obstructive uropathy with ureteral stent in place and ultimately underwent uteteral stent exchange procedure. Today, she presents here with multiple complaints. Two days ago (6/26/22), patient developed a cough, SOB, generalized fatigue, and chest pain described as "heavy and pressure" that is exacerbated when coughing or taking deep breaths. Last night, she had a fever and chills which has now resolved. Patient denies abdominal pain or vomiting. She also complains of left thigh pain but denies left hip pain or left lower leg pain. No history of DVT. Patient is able to bear weight on the LLE. She is not anticoagulated. There was no recent fall or injury. Patient missed dialysis yesterday as she was feeling ill.    The history is provided by the patient.     Review of patient's allergies indicates:   Allergen Reactions    Ciprofloxacin Other (See Comments)     Elevated liver enzymes      Pcn [penicillins] Anaphylaxis     TOLERATES ROCEPHIN WITHOUT DIFFICULTY     Past Medical History:   Diagnosis Date    Cellulitis of trunk     Perineum, numerous episodes    Chronic diarrhea 02/10/2020    Short-gut syndrome    Diabetes mellitus, type 2     pt has not had for years after losing weight    End stage renal disease on dialysis 01/07/2018    Fatigue     Hypertension     " Hypothyroidism 2/10/2020    Iron deficiency anemia due to chronic blood loss 1/7/2018    Pulmonary hypertension 3/20/2020    Vulvar cancer 1/7/2019     Past Surgical History:   Procedure Laterality Date    ABDOMINAL SURGERY      ANGIOGRAPHY OF ARTERIOVENOUS SHUNT N/A 1/19/2022    Procedure: Fistulogram with Possible Intervention;  Surgeon: Raz Maher MD;  Location: [facility] CATH/EP LAB;  Service: Cardiothoracic;  Laterality: N/A;    APPENDECTOMY      AV FISTULA PLACEMENT Left 2018    BACK SURGERY      BLADDER FULGURATION N/A 6/16/2020    Procedure: FULGURATION, BLADDER;  Surgeon: Damari Barber Jr., MD;  Location: [facility] OR;  Service: Urology;  Laterality: N/A;    carpel tunnel surgery once on each hand      CERVICAL FUSION      x 4    cervix repair      CHOLECYSTECTOMY  2000    COLECTOMY      CYSTOSCOPY W/ RETROGRADES  6/16/2020    Procedure: CYSTOSCOPY, WITH RETROGRADE PYELOGRAM;  Surgeon: Damari Barber Jr., MD;  Location: [facility] OR;  Service: Urology;;    CYSTOSCOPY W/ RETROGRADES Right 4/20/2021    Procedure: CYSTOSCOPY, WITH RETROGRADE PYELOGRAM;  Surgeon: Damari Barber Jr., MD;  Location: [facility] OR;  Service: Urology;  Laterality: Right;    CYSTOSCOPY W/ URETERAL STENT PLACEMENT Right 3/16/2021    Procedure: CYSTOSCOPY, WITH URETERAL STENT INSERTION;  Surgeon: Damari Barber Jr., MD;  Location: [facility] OR;  Service: Urology;  Laterality: Right;    CYSTOSCOPY W/ URETERAL STENT PLACEMENT Right 5/18/2021    Procedure: CYSTOSCOPY, WITH URETERAL STENT INSERTION;  Surgeon: Damari Barber Jr., MD;  Location: [facility] OR;  Service: Urology;  Laterality: Right;    CYSTOSCOPY W/ URETERAL STENT PLACEMENT Right 6/22/2022    Procedure: CYSTOSCOPY, WITH URETERAL STENT INSERTION;  Surgeon: Gaby Garcia MD;  Location: [facility] OR;  Service: Urology;  Laterality: Right;  cysto stent exchange    CYSTOSCOPY W/ URETERAL STENT REMOVAL Right 4/20/2021    Procedure: CYSTOSCOPY, WITH URETERAL STENT REMOVAL;   Surgeon: Damari Barber Jr., MD;  Location: [facility] OR;  Service: Urology;  Laterality: Right;    CYSTOSCOPY WITH URETEROSCOPY, RETROGRADE PYELOGRAPHY, AND INSERTION OF STENT Right 3/30/2021    Procedure: CYSTOSCOPY, WITH RETROGRADE PYELOGRAM AND URETERAL STENT INSERTION;  Surgeon: Damari Barber Jr., MD;  Location: [facility] OR;  Service: Urology;  Laterality: Right;    CYSTOURETEROSCOPY WITH RETROGRADE PYELOGRAPHY AND INSERTION OF STENT INTO URETER Right 1/18/2022    Procedure: CYSTOURETEROSCOPY, WITH RETROGRADE PYELOGRAM AND URETERAL STENT INSERTION;  Surgeon: Damari Barber Jr., MD;  Location: [facility] OR;  Service: Urology;  Laterality: Right;    DIAGNOSTIC LAPAROSCOPY N/A 2/13/2020    Procedure: LAPAROSCOPY, DIAGNOSTIC;  Surgeon: Robbi Lovell III, MD;  Location: [facility] OR;  Service: General;  Laterality: N/A;    HYSTERECTOMY  1980    ILEOSTOMY N/A 2/13/2020    Procedure: CREATION, ILEOSTOMY Loop;  Surgeon: Robbi Lovell III, MD;  Location: [facility] OR;  Service: General;  Laterality: N/A;    ILEOSTOMY CLOSURE N/A 5/18/2020    Procedure: CLOSURE, ILEOSTOMY;  Surgeon: Robbi Lovell III, MD;  Location: [facility] OR;  Service: General;  Laterality: N/A;    LASER LITHOTRIPSY Right 3/30/2021    Procedure: LITHOTRIPSY, USING LASER;  Surgeon: Damari Barber Jr., MD;  Location: [facility] OR;  Service: Urology;  Laterality: Right;    LUMBAR LAMINECTOMY      LYSIS OF ADHESIONS N/A 2/13/2020    Procedure: LYSIS, ADHESIONS;  Surgeon: Robbi Lovell III, MD;  Location: [facility] OR;  Service: General;  Laterality: N/A;    NECK SURGERY      PERCUTANEOUS TRANSLUMINAL ANGIOPLASTY OF ARTERIOVENOUS FISTULA N/A 1/19/2022    Procedure: PTA, AV FISTULA;  Surgeon: Raz Maher MD;  Location: [facility] CATH/EP LAB;  Service: Cardiothoracic;  Laterality: N/A;    PHLEBOGRAPHY  2/28/2020    Procedure: VENOGRAM;  Surgeon: Robbi Lovell III, MD;  Location: [facility] OR;  Service: General;;    REMOVAL OF VASCULAR ACCESS PORT Right 2/13/2020     Procedure: REMOVAL, VASCULAR ACCESS PORT;  Surgeon: Robbi Lovell III, MD;  Location: [facility] OR;  Service: General;  Laterality: Right;    RETROGRADE PYELOGRAPHY  3/16/2021    Procedure: PYELOGRAM, RETROGRADE;  Surgeon: Damari Barber Jr., MD;  Location: [facility] OR;  Service: Urology;;    RETROGRADE PYELOGRAPHY Right 2021    Procedure: PYELOGRAM, RETROGRADE;  Surgeon: Damari Barber Jr., MD;  Location: [facility] OR;  Service: Urology;  Laterality: Right;    RETROGRADE PYELOGRAPHY  2022    Procedure: PYELOGRAM, RETROGRADE;  Surgeon: Gaby Garcia MD;  Location: [facility] OR;  Service: Urology;;    SHOULDER SURGERY      URETEROSCOPIC REMOVAL OF URETERIC CALCULUS Right 3/30/2021    Procedure: REMOVAL, CALCULUS, URETER, URETEROSCOPIC;  Surgeon: Damari Barber Jr., MD;  Location: [facility] OR;  Service: Urology;  Laterality: Right;    URETEROSCOPIC REMOVAL OF URETERIC CALCULUS Right 2021    Procedure: REMOVAL, CALCULUS, URETER, URETEROSCOPIC;  Surgeon: Damari Barber Jr., MD;  Location: [facility] OR;  Service: Urology;  Laterality: Right;    vulva cancer       Family History   Problem Relation Age of Onset    Heart disease Mother     Hypertension Mother     Cancer Mother         lung    Heart disease Father     Heart disease Sister         Open heart surgery    No Known Problems Daughter      Social History     Tobacco Use    Smoking status: Former Smoker     Packs/day: 1.00     Years: 33.00     Pack years: 33.00     Types: Cigarettes     Quit date: 2000     Years since quittin.5    Smokeless tobacco: Former User     Quit date:    Substance Use Topics    Alcohol use: No    Drug use: No     Review of Systems   Constitutional: Positive for chills, fatigue and fever.   Respiratory: Positive for cough and shortness of breath.    Cardiovascular: Positive for chest pain.   Gastrointestinal: Positive for diarrhea. Negative for abdominal pain and vomiting.   Musculoskeletal: Positive for  myalgias (left thigh). Negative for arthralgias and gait problem.   Hematological: Does not bruise/bleed easily.   All other systems reviewed and are negative.      Physical Exam     Initial Vitals [06/28/22 1652]   BP Pulse Resp Temp SpO2   (!) 166/57 79 20 98.9 °F (37.2 °C) 96 %      MAP       --         Physical Exam    Nursing note and vitals reviewed.  Constitutional: She appears well-developed and well-nourished. She is not diaphoretic. No distress.   HENT:   Head: Normocephalic and atraumatic.   Eyes: EOM are normal.   Neck: Neck supple.   Normal range of motion.  Cardiovascular: Normal rate, regular rhythm and normal heart sounds. Exam reveals no gallop and no friction rub.    No murmur heard.  Pulses:       Dorsalis pedis pulses are 2+ on the left side.        Posterior tibial pulses are 2+ on the left side.   Pulmonary/Chest: No respiratory distress. She has no wheezes. She has no rhonchi. She has no rales.   Pulmonary congestion.   Abdominal: Abdomen is soft. She exhibits no distension. There is no abdominal tenderness.   Musculoskeletal:         General: Normal range of motion.      Cervical back: Normal range of motion and neck supple.      Comments: Left upper extremity fistula with a good thrill. Muscular  tenderness to the left thigh.     Neurological: She is alert and oriented to person, place, and time.   Skin: Skin is warm and dry.   Psychiatric: She has a normal mood and affect. Her behavior is normal. Judgment and thought content normal.         ED Course   Procedures  Labs Reviewed   CBC W/ AUTO DIFFERENTIAL - Abnormal; Notable for the following components:       Result Value    RBC 2.97 (*)     Hemoglobin 10.5 (*)     Hematocrit 32.7 (*)      (*)     MCH 35.4 (*)     Immature Granulocytes 1.0 (*)     Immature Grans (Abs) 0.10 (*)     Gran % 74.4 (*)     Lymph % 9.6 (*)     All other components within normal limits   COMPREHENSIVE METABOLIC PANEL - Abnormal; Notable for the following  components:    Potassium 3.2 (*)     Chloride 112 (*)     CO2 12 (*)     BUN 46 (*)     Creatinine 7.8 (*)     Calcium 8.5 (*)     Albumin 2.9 (*)     Alkaline Phosphatase 293 (*)     eGFR if  5 (*)     eGFR if non  5 (*)     All other components within normal limits   SARS-COV-2 RNA AMPLIFICATION, QUAL - Abnormal; Notable for the following components:    SARS-CoV-2 RNA, Amplification, Qual Positive (*)     All other components within normal limits   INFLUENZA A & B BY MOLECULAR   TROPONIN I          Imaging Results          US Lower Extremity Veins Left (Final result)  Result time 06/28/22 18:15:16    Final result by Raz Navarro Jr., MD (06/28/22 18:15:16)                 Impression:      No evidence of deep venous thrombosis in the left lower extremity.      Electronically signed by: Raz Navarro MD  Date:    06/28/2022  Time:    18:15             Narrative:    EXAMINATION:  US LOWER EXTREMITY VEINS LEFT    CLINICAL HISTORY:  Pain in leg, unspecified    TECHNIQUE:  Duplex and color flow Doppler evaluation and graded compression of the left lower extremity veins was performed.    COMPARISON:  None    FINDINGS:  Left thigh veins: The common femoral, femoral, popliteal, upper greater saphenous, and deep femoral veins are patent and free of thrombus. The veins are normally compressible and have normal phasic flow and augmentation response.    Left calf veins: The visualized calf veins are patent.    Contralateral CFV: The contralateral (right) common femoral vein is patent and free of thrombus.    Miscellaneous: None                               X-Ray Chest AP Portable (Final result)  Result time 06/28/22 17:47:46    Final result by Raz Navarro Jr., MD (06/28/22 17:47:46)                 Impression:      Cardiomegaly and mild pulmonary edema consistent with congestive heart failure.      Electronically signed by: Raz Navarro  MD  Date:    06/28/2022  Time:    17:47             Narrative:    EXAMINATION:  XR CHEST AP PORTABLE    CLINICAL HISTORY:  Chest Pain;    TECHNIQUE:  Single frontal view of the chest was performed.    COMPARISON:  Chest of June 23, 2022    FINDINGS:  There is mild cardiomegaly in a left ventricular predominant pattern there is prominence of the pulmonary vasculature and mild perihilar infiltrate consistent with pulmonary edema and congestive heart failure.  The peripheral lung fields are clear.  No pneumothorax is seen.                                 Medications   potassium bicarbonate disintegrating tablet 50 mEq (50 mEq Oral Given 6/28/22 1931)     Medical Decision Making:   History:   Old Medical Records: I decided to obtain old medical records.  Clinical Tests:   Lab Tests: Ordered and Reviewed  Radiological Study: Reviewed and Ordered  Medical Tests: Ordered and Reviewed          Scribe Attestation:   Scribe #1: I performed the above scribed service and the documentation accurately describes the services I performed. I attest to the accuracy of the note.        ED Course as of 06/28/22 1957 Tue Jun 28, 2022   1710 BP(!): 166/57 [EF]   1710 Temp: 98.9 °F (37.2 °C) [EF]   1710 Temp src: Oral [EF]   1710 Pulse: 79 [EF]   1710 Resp: 20 [EF]   1710 SpO2: 96 % [EF]   1751 X-Ray Chest AP Portable [EF]   1830 US Lower Extremity Veins Left [EF]   1843 SARS-CoV-2 RNA, Amplification, Qual(!): Positive  Case discussed with Dr. Webb regarding low bicarbonate.  He states that no immediate intervention is warranted.  Dialysis tomorrow as scheduled. [EF]   1914 ALPHONSO rodney to admit [EF]   1956 Potassium(!): 3.2 [EF]   1956 CO2(!): 12 [EF]      ED Course User Index  [EF] Maroks Keller MD           I, Dr. Keller, personally performed the services described in this documentation. All medical record entries made by the scribe were at my direction and in my presence.  I have reviewed the chart and agree that the record  reflects my personal performance and is accurate and complete.7:55 PM 06/28/2022      Clinical Impression:   Final diagnoses:  [R07.9] Chest pain  [M79.606] Leg pain  [U07.1] COVID-19 (Primary)  [E87.8] Hypocarbia          ED Disposition Condition    Observation               72-year-old female with numerous medical problems Including chronic diarrhea, diabetes, dialysis, cancer presents to the ER with chest congestion, cough, chest heaviness.  She appears fatigued in the emergency room but no distress.  Bicarbonate is very low.  Perhaps secondary to worsening chronic diarrhea.  COVID is positive.  Case discussed with Nephrology who does not recommend any acute intervention for the bicarbonate.  Potassium replaced.  Admitted to Hospital Medicine.     Markos Keller MD  06/28/22 1958

## 2022-06-28 NOTE — ED NOTES
Pt is seen a/o x4, resp even and unlabored, GCS 15, ABC's intact, VSS at this time . Pt  complains of cough nasal congestion x2 days. Pt has a picc line self administered iv antibiotics, right upper arm  dressing dry and intact.  Seen and assessed by MD, plan of care discussed. Bed locked in lowest position. Call light within reach. Will continue with plan of care and monitor patient.

## 2022-06-29 NOTE — HPI
Shara Hutchins is a 72-year-old female who presents emergency room complaining of cough, fever, chills, nausea, vomiting, shortness of breath.  The symptoms onset approximately 48 hours ago and progressively worsened.  Of note patient was recently admitted to [facility] where she was admitted for obstructive uropathy requiring ureteral stenting.  She is vaccinated for COVID.  She had a booster for COVID.  Previous medical history includes respiratory failure, anemia, DARCI, COPD, end-stage renal disease on dialysis (Monday Wednesday Friday), hypertension, vulvar cancer, and hypothyroidism.  ER workup:  CBC with baseline anemia.  CMP with potassium of 3.2 which was repleted by ER.  BUN of 46 creatinine of 7.8, bicarb of 12. COVID test positive Chest x-ray demonstrates cardiomegaly a with mild edema with no evidence of congestive heart failure.  Left leg ultrasound was negative for DVT.  Patient admitted to Hospital Medicine for observation management.

## 2022-06-29 NOTE — NURSING
Midline pulled per Ingrid with PICC Team. Site is infiltrated. Dr. Brice notified via secure chat for aPTT order if needed. They will assess to see if they can get another site.

## 2022-06-29 NOTE — NURSING
APTT 33.6. Verified with Dr. Brice to follow the high intensity nomogram. Patient will receive bolus of 30 units/kg and increase by 2 units from previous setting of 20 units/kg/hr.

## 2022-06-29 NOTE — CARE UPDATE
06/29/22 0743   Patient Assessment/Suction   Level of Consciousness (AVPU) alert   Respiratory Effort Normal   Expansion/Accessory Muscles/Retractions expansion symmetric   All Lung Fields Breath Sounds rhonchi   Rhythm/Pattern, Respiratory pattern regular   Cough Frequency frequent   Cough Type good;congested   PRE-TX-O2   O2 Device (Oxygen Therapy) room air   SpO2 96 %   Pulse Oximetry Type Intermittent   $ Pulse Oximetry - Multiple Charge Pulse Oximetry - Multiple   Pulse 75   Resp 18   Inhaler   $ Inhaler Charges MDI (Metered Dose Inahler) Treatment   Daily Review of Necessity (Inhaler) completed   Respiratory Treatment Status (Inhaler) given   Treatment Route (Inhaler) spacer/holding chamber   Patient Position (Inhaler) semi-Ag's   Signs of Intolerance (Inhaler) none   Breath Sounds Post-Respiratory Treatment   Throughout All Fields Post-Treatment no change   Post-treatment Heart Rate (beats/min) 75   Post-treatment Resp Rate (breaths/min) 18

## 2022-06-29 NOTE — NURSING
Patient refused sevelamar. She wants to take her own medication because our pills look different. Reassured that they are same pills, but wants to take her own.     Verbal order received from Dr. Brice to change diet to regular. This is done.

## 2022-06-29 NOTE — SUBJECTIVE & OBJECTIVE
Past Medical History:   Diagnosis Date    Cellulitis of trunk     Perineum, numerous episodes    Chronic diarrhea 02/10/2020    Short-gut syndrome    Diabetes mellitus, type 2     pt has not had for years after losing weight    End stage renal disease on dialysis 01/07/2018    Fatigue     Hypertension     Hypothyroidism 2/10/2020    Iron deficiency anemia due to chronic blood loss 1/7/2018    Pulmonary hypertension 3/20/2020    Vulvar cancer 1/7/2019       Past Surgical History:   Procedure Laterality Date    ABDOMINAL SURGERY      ANGIOGRAPHY OF ARTERIOVENOUS SHUNT N/A 1/19/2022    Procedure: Fistulogram with Possible Intervention;  Surgeon: Raz Maher MD;  Location: [facility] CATH/EP LAB;  Service: Cardiothoracic;  Laterality: N/A;    APPENDECTOMY      AV FISTULA PLACEMENT Left 2018    BACK SURGERY      BLADDER FULGURATION N/A 6/16/2020    Procedure: FULGURATION, BLADDER;  Surgeon: Damari Barber Jr., MD;  Location: [facility] OR;  Service: Urology;  Laterality: N/A;    carpel tunnel surgery once on each hand      CERVICAL FUSION      x 4    cervix repair      CHOLECYSTECTOMY  2000    COLECTOMY      CYSTOSCOPY W/ RETROGRADES  6/16/2020    Procedure: CYSTOSCOPY, WITH RETROGRADE PYELOGRAM;  Surgeon: Damari Barber Jr., MD;  Location: [facility] OR;  Service: Urology;;    CYSTOSCOPY W/ RETROGRADES Right 4/20/2021    Procedure: CYSTOSCOPY, WITH RETROGRADE PYELOGRAM;  Surgeon: Damari Barber Jr., MD;  Location: [facility] OR;  Service: Urology;  Laterality: Right;    CYSTOSCOPY W/ URETERAL STENT PLACEMENT Right 3/16/2021    Procedure: CYSTOSCOPY, WITH URETERAL STENT INSERTION;  Surgeon: Damari Barber Jr., MD;  Location: [facility] OR;  Service: Urology;  Laterality: Right;    CYSTOSCOPY W/ URETERAL STENT PLACEMENT Right 5/18/2021    Procedure: CYSTOSCOPY, WITH URETERAL STENT INSERTION;  Surgeon: Damari Barber Jr., MD;  Location: [facility] OR;  Service: Urology;  Laterality: Right;    CYSTOSCOPY W/ URETERAL STENT PLACEMENT Right 6/22/2022     Procedure: CYSTOSCOPY, WITH URETERAL STENT INSERTION;  Surgeon: Gaby Garcia MD;  Location: [facility] OR;  Service: Urology;  Laterality: Right;  cysto stent exchange    CYSTOSCOPY W/ URETERAL STENT REMOVAL Right 4/20/2021    Procedure: CYSTOSCOPY, WITH URETERAL STENT REMOVAL;  Surgeon: Damari Barber Jr., MD;  Location: [facility] OR;  Service: Urology;  Laterality: Right;    CYSTOSCOPY WITH URETEROSCOPY, RETROGRADE PYELOGRAPHY, AND INSERTION OF STENT Right 3/30/2021    Procedure: CYSTOSCOPY, WITH RETROGRADE PYELOGRAM AND URETERAL STENT INSERTION;  Surgeon: Damari Barber Jr., MD;  Location: [facility] OR;  Service: Urology;  Laterality: Right;    CYSTOURETEROSCOPY WITH RETROGRADE PYELOGRAPHY AND INSERTION OF STENT INTO URETER Right 1/18/2022    Procedure: CYSTOURETEROSCOPY, WITH RETROGRADE PYELOGRAM AND URETERAL STENT INSERTION;  Surgeon: Damari Barber Jr., MD;  Location: [facility] OR;  Service: Urology;  Laterality: Right;    DIAGNOSTIC LAPAROSCOPY N/A 2/13/2020    Procedure: LAPAROSCOPY, DIAGNOSTIC;  Surgeon: Robbi Lovell III, MD;  Location: [facility];  Service: General;  Laterality: N/A;    HYSTERECTOMY  1980    ILEOSTOMY N/A 2/13/2020    Procedure: CREATION, ILEOSTOMY Loop;  Surgeon: Robbi Lovell III, MD;  Location: [facility];  Service: General;  Laterality: N/A;    ILEOSTOMY CLOSURE N/A 5/18/2020    Procedure: CLOSURE, ILEOSTOMY;  Surgeon: Robbi Lovell III, MD;  Location: [facility] OR;  Service: General;  Laterality: N/A;    LASER LITHOTRIPSY Right 3/30/2021    Procedure: LITHOTRIPSY, USING LASER;  Surgeon: Damari Barber Jr., MD;  Location: [facility] OR;  Service: Urology;  Laterality: Right;    LUMBAR LAMINECTOMY      LYSIS OF ADHESIONS N/A 2/13/2020    Procedure: LYSIS, ADHESIONS;  Surgeon: Robbi Lovell III, MD;  Location: [facility] OR;  Service: General;  Laterality: N/A;    NECK SURGERY      PERCUTANEOUS TRANSLUMINAL ANGIOPLASTY OF ARTERIOVENOUS FISTULA N/A 1/19/2022    Procedure: PTA, AV FISTULA;  Surgeon:  Raz Maher MD;  Location: [facility] CATH/EP LAB;  Service: Cardiothoracic;  Laterality: N/A;    PHLEBOGRAPHY  2/28/2020    Procedure: VENOGRAM;  Surgeon: Robbi Lovell III, MD;  Location: [facility] OR;  Service: General;;    REMOVAL OF VASCULAR ACCESS PORT Right 2/13/2020    Procedure: REMOVAL, VASCULAR ACCESS PORT;  Surgeon: Robbi Lovell III, MD;  Location: [facility] OR;  Service: General;  Laterality: Right;    RETROGRADE PYELOGRAPHY  3/16/2021    Procedure: PYELOGRAM, RETROGRADE;  Surgeon: Damari Barber Jr., MD;  Location: [facility] OR;  Service: Urology;;    RETROGRADE PYELOGRAPHY Right 5/18/2021    Procedure: PYELOGRAM, RETROGRADE;  Surgeon: Damari Barber Jr., MD;  Location: [facility] OR;  Service: Urology;  Laterality: Right;    RETROGRADE PYELOGRAPHY  6/22/2022    Procedure: PYELOGRAM, RETROGRADE;  Surgeon: Gaby Garcia MD;  Location: [facility] OR;  Service: Urology;;    SHOULDER SURGERY      URETEROSCOPIC REMOVAL OF URETERIC CALCULUS Right 3/30/2021    Procedure: REMOVAL, CALCULUS, URETER, URETEROSCOPIC;  Surgeon: Damari Barber Jr., MD;  Location: [facility] OR;  Service: Urology;  Laterality: Right;    URETEROSCOPIC REMOVAL OF URETERIC CALCULUS Right 4/20/2021    Procedure: REMOVAL, CALCULUS, URETER, URETEROSCOPIC;  Surgeon: Damari Barber Jr., MD;  Location: [facility] OR;  Service: Urology;  Laterality: Right;    vulva cancer         Review of patient's allergies indicates:   Allergen Reactions    Ciprofloxacin Other (See Comments)     Elevated liver enzymes      Pcn [penicillins] Anaphylaxis     TOLERATES ROCEPHIN WITHOUT DIFFICULTY       Current Facility-Administered Medications on File Prior to Encounter   Medication    electrolyte-S (ISOLYTE)    lactated ringers infusion    LIDOcaine (PF) 10 mg/ml (1%) injection 5 mg     Current Outpatient Medications on File Prior to Encounter   Medication Sig    albuterol-ipratropium (DUO-NEB) 2.5 mg-0.5 mg/3 mL nebulizer solution Take 3 mLs by nebulization every 6 (six)  hours as needed for Wheezing or Shortness of Breath. Rescue    diltiaZEM (CARDIZEM CD) 120 MG Cp24 Take 1 capsule (120 mg total) by mouth once daily.    ferric citrate (AURYXIA) 210 mg iron Tab Take 2 tablets by mouth 3 (three) times daily with meals.    gabapentin (NEURONTIN) 100 MG capsule Take 100 mg by mouth 2 (two) times daily. HCS    hydrALAZINE (APRESOLINE) 50 MG tablet Take 1 tablet (50 mg total) by mouth every 8 (eight) hours.    levothyroxine (SYNTHROID) 88 MCG tablet TAKE 1 TABLET BEFORE BREAKFAST (Patient taking differently: Take 88 mcg by mouth before breakfast. TAKE 1 TABLET BEFORE BREAKFAST)    LORazepam (ATIVAN) 0.5 MG tablet Take 1 tablet (0.5 mg total) by mouth every evening.    promethazine (PHENERGAN) 25 MG tablet TAKE 1 TABLET(25 MG) BY MOUTH TWICE DAILY AS NEEDED FOR NAUSEA (Patient taking differently: Take 25 mg by mouth 2 (two) times daily as needed for Nausea.)    rOPINIRole (REQUIP) 0.5 MG tablet Take 0.5 mg by mouth every 8 (eight) hours.    sertraline (ZOLOFT) 50 MG tablet Take 1 tablet (50 mg total) by mouth every evening. Take 50 mg by mouth every evening.    [DISCONTINUED] meropenem-0.9% sodium chloride 500 mg/50 mL PgBk Inject 50 mLs (500 mg total) into the vein every 12 (twelve) hours.    [DISCONTINUED] miconazole (MICOTIN) 2 % cream Apply topically 2 (two) times daily.     Family History       Problem Relation (Age of Onset)    Cancer Mother    Heart disease Mother, Father, Sister    Hypertension Mother    No Known Problems Daughter          Tobacco Use    Smoking status: Former Smoker     Packs/day: 1.00     Years: 33.00     Pack years: 33.00     Types: Cigarettes     Quit date: 2000     Years since quittin.5    Smokeless tobacco: Former User     Quit date:    Substance and Sexual Activity    Alcohol use: No    Drug use: No    Sexual activity: Not on file     Review of Systems   Constitutional:  Positive for activity change, chills and fever. Negative for diaphoresis.    HENT:  Negative for congestion, nosebleeds and tinnitus.    Eyes:  Negative for photophobia and visual disturbance.   Respiratory:  Positive for cough and shortness of breath. Negative for chest tightness and wheezing.    Cardiovascular:  Negative for chest pain, palpitations and leg swelling.   Gastrointestinal:  Positive for nausea and vomiting. Negative for abdominal distention, abdominal pain, constipation and diarrhea.   Endocrine: Negative for cold intolerance and heat intolerance.   Genitourinary:  Negative for difficulty urinating, dysuria, frequency, hematuria and urgency.   Musculoskeletal:  Negative for arthralgias, back pain and myalgias.   Skin:  Negative for pallor, rash and wound.   Allergic/Immunologic: Negative for immunocompromised state.   Neurological:  Negative for dizziness, tremors, facial asymmetry, speech difficulty and weakness.   Hematological:  Negative for adenopathy. Does not bruise/bleed easily.   Psychiatric/Behavioral:  Negative for confusion and sleep disturbance. The patient is not nervous/anxious.    Objective:     Vital Signs (Most Recent):  Temp: 97.4 °F (36.3 °C) (06/28/22 2219)  Pulse: 92 (06/28/22 2219)  Resp: 18 (06/28/22 2219)  BP: (!) 132/58 (06/28/22 2219)  SpO2: 97 % (06/28/22 2219)   Vital Signs (24h Range):  Temp:  [97.4 °F (36.3 °C)-98.9 °F (37.2 °C)] 97.4 °F (36.3 °C)  Pulse:  [76-92] 92  Resp:  [18-20] 18  SpO2:  [94 %-100 %] 97 %  BP: (132-204)/() 132/58     Weight: 74.7 kg (164 lb 10.9 oz)  Body mass index is 25.41 kg/m².    Physical Exam  Vitals and nursing note reviewed.   Constitutional:       General: She is not in acute distress.     Appearance: She is well-developed. She is ill-appearing. She is not diaphoretic.   HENT:      Head: Normocephalic.      Mouth/Throat:      Mouth: Mucous membranes are moist.      Pharynx: Oropharynx is clear.   Eyes:      General: No scleral icterus.     Conjunctiva/sclera: Conjunctivae normal.      Pupils: Pupils are  equal, round, and reactive to light.   Neck:      Vascular: No JVD.   Cardiovascular:      Rate and Rhythm: Normal rate and regular rhythm.      Heart sounds: Normal heart sounds. No murmur heard.    No friction rub. No gallop.   Pulmonary:      Effort: Respiratory distress present.      Breath sounds: Rhonchi present. No wheezing or rales.   Abdominal:      General: Bowel sounds are normal. There is no distension.      Palpations: Abdomen is soft.      Tenderness: There is no abdominal tenderness. There is no guarding or rebound.   Musculoskeletal:         General: No tenderness. Normal range of motion.      Cervical back: Normal range of motion and neck supple.   Lymphadenopathy:      Cervical: No cervical adenopathy.   Skin:     General: Skin is warm and dry.      Capillary Refill: Capillary refill takes less than 2 seconds.      Coloration: Skin is not pale.      Findings: No erythema or rash.   Neurological:      Mental Status: She is alert and oriented to person, place, and time.      Cranial Nerves: No cranial nerve deficit.      Sensory: No sensory deficit.      Coordination: Coordination normal.      Deep Tendon Reflexes: Reflexes normal.   Psychiatric:         Behavior: Behavior normal.         Thought Content: Thought content normal.         Judgment: Judgment normal.         CRANIAL NERVES     CN III, IV, VI   Pupils are equal, round, and reactive to light.     Significant Labs: All pertinent labs within the past 24 hours have been reviewed.  CBC:   Recent Labs   Lab 06/28/22  1739 06/28/22  2147   WBC 10.34 10.68   HGB 10.5* 10.6*   HCT 32.7* 32.4*    117*     CMP:   Recent Labs   Lab 06/28/22  1739      K 3.2*   *   CO2 12*   GLU 89   BUN 46*   CREATININE 7.8*   CALCIUM 8.5*   PROT 6.1   ALBUMIN 2.9*   BILITOT 0.9   ALKPHOS 293*   AST 15   ALT 30   ANIONGAP 16   EGFRNONAA 5*     Cardiac Markers:   Recent Labs   Lab 06/28/22  2147   *     Magnesium: No results for input(s): MG  in the last 48 hours.    Significant Imaging: I have reviewed all pertinent imaging results/findings within the past 24 hours.    CXR  FINDINGS:  There is mild cardiomegaly in a left ventricular predominant pattern there is prominence of the pulmonary vasculature and mild perihilar infiltrate consistent with pulmonary edema and congestive heart failure.  The peripheral lung fields are clear.  No pneumothorax is seen.     Impression:     Cardiomegaly and mild pulmonary edema consistent with congestive heart failure.    Ultrasound Leg     FINDINGS:  Left thigh veins: The common femoral, femoral, popliteal, upper greater saphenous, and deep femoral veins are patent and free of thrombus. The veins are normally compressible and have normal phasic flow and augmentation response.     Left calf veins: The visualized calf veins are patent.     Contralateral CFV: The contralateral (right) common femoral vein is patent and free of thrombus.     Miscellaneous: None     Impression:     No evidence of deep venous thrombosis in the left lower extremity.

## 2022-06-29 NOTE — ASSESSMENT & PLAN NOTE
Acute   - COVID-19 testing   - Infection Control notified     - Isolation:   - Airborne, Contact and Droplet Precautions  - Cohort patients into COVID units  - N95 mask, wear eye protection  - 20 second hand hygiene              - Limit visitors per hospital policy              - Consolidating lab draws, nursing care, provider visits, and interventions    - Diagnostics: (leukopenia, hyponatremia, hyperferritinemia, elevated troponin, elevated d-dimer, age, and comorbidities are significant predictors of poor clinical outcome)  CBC, CMP, Ferritin, CRP and Portable CXR    - Management:  Supplemental O2 to maintain SpO2 >92%  Telemetry  Continuous/intermittent Pulse Ox  Albuterol treatment PRN    Advance Care Planning   Full Code

## 2022-06-29 NOTE — PROGRESS NOTES
Brief update:   Still feels globally unwell with chest discomfort with deep inspiration.     NAD  Crackles at bases  RRR  No edema    1. COVID-19  -given ESRD and little data, will hold off on starting remdesivir at this point. Reconsider if declines  -stopping dexamethasone at this point as not hypoxic  -continue heparin gtt. 4 extremity doppler and VQ scan requested.     2. ESRD  -HD  -renal consulted      Billed by another provider.

## 2022-06-29 NOTE — ASSESSMENT & PLAN NOTE
Chronic  Patient's anemia is currently controlled. Has not received any PRBCs to date.. Etiology likely d/t chronic disease  Current CBC reviewed-   Lab Results   Component Value Date    HGB 10.6 (L) 06/28/2022    HCT 32.4 (L) 06/28/2022     Monitor serial CBC and transfuse if patient becomes hemodynamically unstable, symptomatic or H/H drops below 7/21.

## 2022-06-29 NOTE — DISCHARGE INSTRUCTIONS
Wound care:  Clean perineal and buttocks areas with mild soap and water and dry thoroughly. Apply Triad hydrophilic wound dressing ointment to affected areas twice a day and leave open to air.   Triad may be purchased online or you may ask your pharmacist to order this for you. It is an over the counter ointment.

## 2022-06-29 NOTE — CARE UPDATE
06/29/22 0042   Patient Assessment/Suction   Level of Consciousness (AVPU) alert   Respiratory Effort Normal;Unlabored   Expansion/Accessory Muscles/Retractions expansion symmetric;no retractions;no use of accessory muscles   All Lung Fields Breath Sounds diminished   Cough Frequency no cough   PRE-TX-O2   O2 Device (Oxygen Therapy) room air   SpO2 (!) 94 %   Pulse Oximetry Type Intermittent   Pulse 78   Resp 18   Inhaler   $ Inhaler Charges MDI (Metered Dose Inahler) Treatment;Given With Spacer;Spacer - Equipment   Respiratory Treatment Status (Inhaler) given   Treatment Route (Inhaler) spacer/holding chamber   Patient Position (Inhaler) HOB elevated   Signs of Intolerance (Inhaler) none   Breath Sounds Post-Respiratory Treatment   Throughout All Fields Post-Treatment All Fields   Throughout All Fields Post-Treatment no change   Post-treatment Heart Rate (beats/min) 74   Post-treatment Resp Rate (breaths/min) 18

## 2022-06-29 NOTE — PLAN OF CARE
[facility]  Initial Discharge Assessment       Primary Care Provider: April Horton MD    Admission Diagnosis: Leg pain [M79.606]  Hypocarbia [E87.8]  Chest pain [R07.9]  COVID-19 [U07.1]    Admission Date: 6/28/2022  Expected Discharge Date: 6/30/2022    Pt is covid positive and on isolation.   called pt and completed discharge assessment.  Pt lives at [facility]. Pt has RW.  No HH.  Pt has dialysis MWF at [facility] in [place].  [facility] will provide transportation home.  Needs to be determined.    Discharge Barriers Identified: None    Payor: [employer] MANAGED MEDICARE / Plan: [employer] MEDICARE PLAN PPO / Product Type: Medicare Advantage /     Extended Emergency Contact Information  Primary Emergency Contact: Stephanie Aguiar   [facility]  Home Phone: [phone]  Relation: Relative   needed? No  Secondary Emergency Contact: tonydemario  Mobile Phone: [phone]  Relation: Daughter   needed? No    Discharge Plan A: Assisted Living  Discharge Plan B: Assisted Living      [id number] - [place], [place] - [address]  [address] [facility] [address]  Phone: [phone] Fax: [phone]    [facility] - [place], [place] - [address]  [address]  [address]  [place] [address]  Phone: [phone] Fax: [phone]      Initial Assessment (most recent)     Adult Discharge Assessment - 06/29/22 1405        Discharge Assessment    Assessment Type Discharge Planning Assessment     Confirmed/corrected address, phone number and insurance Yes     Confirmed Demographics Correct on Facesheet     Source of Information patient     Communicated RODRIGO with patient/caregiver No     Lives With alone     Do you expect to return to your current living situation? Yes     Walking or Climbing Stairs Difficulty ambulation difficulty, requires equipment     Mobility Management RW     Dressing/Bathing Difficulty none      Equipment Currently Used at Home walker, rolling     Patient currently being followed by outpatient case management? No     Do you currently have service(s) that help you manage your care at home? No     Do you take prescription medications? Yes     Do you have prescription coverage? Yes     Do you have any problems affording any of your prescribed medications? No     Is the patient taking medications as prescribed? yes     Who is going to help you get home at discharge? [facility]     How do you get to doctors appointments? other (see comments)   Assisted Living facility    Are you on dialysis? Yes     Dialysis Name and Scheduled days [facility]     Do you take coumadin? No     Discharge Plan A Assisted Living     Discharge Plan B Assisted Living     DME Needed Upon Discharge  none     Discharge Plan discussed with: Patient     Discharge Barriers Identified None

## 2022-06-29 NOTE — NURSING
Dr. Dietz notified of midline bleeding. He states to have house supervisor to try a PIV, if unable to obtain, may have to get anesthesia to obtain. House supervisor/Ankit notified and instruction given to contact PICC team for PIV. PICC team notified and will come and access. Heparin drip stopped.

## 2022-06-29 NOTE — PLAN OF CARE
Problem: Adult Inpatient Plan of Care  Goal: Plan of Care Review  Outcome: Ongoing, Progressing   Patient supine with HOB . POC and meds reviewed with patient and spouse. Verbalized understanding. Tele [id number] in use. NS infusing into Left hand at 125cc/hr without difficulty. SR up x 2. Call light in reach. Bed  in low position .   Problem: Adult Inpatient Plan of Care  Goal: Optimal Comfort and Wellbeing  Outcome: Ongoing, Progressing   Patient ambulating in [place] with PT and tolerated well.   Problem: Fall Injury Risk  Goal: Absence of Fall and Fall-Related Injury  Outcome: Ongoing, Progressing   Patient with cg at bedside. SR

## 2022-06-29 NOTE — ASSESSMENT & PLAN NOTE
Chronic  Chronic, controlled.  Latest blood pressure and vitals reviewed-   Temp:  [97.4 °F (36.3 °C)-98.9 °F (37.2 °C)]   Pulse:  [76-92]   Resp:  [18-20]   BP: (132-204)/()   SpO2:  [94 %-100 %] .   Home meds for hypertension were reviewed and noted below.   Hypertension Medications             diltiaZEM (CARDIZEM CD) 120 MG Cp24 Take 1 capsule (120 mg total) by mouth once daily.    hydrALAZINE (APRESOLINE) 50 MG tablet Take 1 tablet (50 mg total) by mouth every 8 (eight) hours.          While in the hospital, will manage blood pressure as follows; Continue home antihypertensive regimen    Will utilize p.r.n. blood pressure medication only if patient's blood pressure greater than  180/110 and she develops symptoms such as worsening chest pain or shortness of breath.

## 2022-06-29 NOTE — NURSING TRANSFER
Nursing Transfer Note      6/29/2022     Reason patient is being transferred: Inpatient admission    Transfer To: 317 from ED    Transfer via wheelchair    Transfer with cardiac monitoring    Any special needs or follow-up needed: Starting heparin drip when up from pharmacy.    Chart send with patient: Yes    Notified: NA, patient notified family    Upon arrival to floor: cardiac monitor applied, patient oriented to room, call bell in reach and bed in lowest position.  Oriented patient to fall precautions.  Altered skin integrity noted to groin and buttocks which is excoriation.  Generalized bruising and dry skin noted.

## 2022-06-29 NOTE — NURSING
Midline site is bleeding. Dressing reinforced and heparin drip stopped. Flushed line and midline is leaking. Midline placed on 6/24/2022. Patient admitted with line. Notified Dr. Brice who stated to contact Nephrology regarding this due to patient being dialysis patient. Patient has ABBE fistula and limb alert on left. Charge nurse/Elodia notified and will notify Nephrology when consult is called.

## 2022-06-29 NOTE — NURSING
Heparin Infusion paused. Patient in process of receiving dialysis. Noted to be bleeding from fistula site. Dr. Brice notified of continuous bleeding. He does not want infusion stopped. Infusion continued and dialysis nurse notified.

## 2022-06-29 NOTE — CONSULTS
18 Gx 10cm PowerGlide Midline placed to pts right brachial vein with the use of ultrasound guidance.    Ultrasound guidance: yes  Vessel Caliber: large and patent, compressibility normal  Needle advanced into vessel with real time Ultrasound guidance.  Guidewire confirmed in vessel.  Image recorded and saved.  Sterile sheath used.  Sterile dressing applied  Arm circumference- 31 cm  Dressing dated   Education provided to patient re: proper maintenance of line- pt verbalized understanding  Limb alert applied

## 2022-06-29 NOTE — H&P
[facility] Medicine  History & Physical    Patient Name: Shara Hutchins  MRN: [id number]  Patient Class: IP- Inpatient  Admission Date: 6/28/2022  Attending Physician: Christiane Brice MD   Primary Care Provider: April Horton MD         Patient information was obtained from patient, past medical records and ER records.     Subjective:     Principal Problem:COVID    Chief Complaint:   Chief Complaint   Patient presents with    Cough     X 2 days - sent from [facility] for evaluation     Nasal Congestion        HPI: Shara Hutchins is a 72-year-old female who presents emergency room complaining of cough, fever, chills, nausea, vomiting, shortness of breath.  The symptoms onset approximately 48 hours ago and progressively worsened.  Of note patient was recently admitted to [facility] where she was admitted for obstructive uropathy requiring ureteral stenting.  She is vaccinated for COVID.  She had a booster for COVID.  Previous medical history includes respiratory failure, anemia, DARCI, COPD, end-stage renal disease on dialysis (Monday Wednesday Friday), hypertension, vulvar cancer, and hypothyroidism.  ER workup:  CBC with baseline anemia.  CMP with potassium of 3.2 which was repleted by ER.  BUN of 46 creatinine of 7.8, bicarb of 12. COVID test positive Chest x-ray demonstrates cardiomegaly a with mild edema with no evidence of congestive heart failure.  Left leg ultrasound was negative for DVT.  Patient admitted to Hospital Medicine for observation management.        Past Medical History:   Diagnosis Date    Cellulitis of trunk     Perineum, numerous episodes    Chronic diarrhea 02/10/2020    Short-gut syndrome    Diabetes mellitus, type 2     pt has not had for years after losing weight    End stage renal disease on dialysis 01/07/2018    Fatigue     Hypertension     Hypothyroidism 2/10/2020    Iron deficiency anemia due to chronic blood loss 1/7/2018    Pulmonary hypertension  3/20/2020    Vulvar cancer 1/7/2019       Past Surgical History:   Procedure Laterality Date    ABDOMINAL SURGERY      ANGIOGRAPHY OF ARTERIOVENOUS SHUNT N/A 1/19/2022    Procedure: Fistulogram with Possible Intervention;  Surgeon: Raz Maher MD;  Location: [facility] CATH/EP LAB;  Service: Cardiothoracic;  Laterality: N/A;    APPENDECTOMY      AV FISTULA PLACEMENT Left 2018    BACK SURGERY      BLADDER FULGURATION N/A 6/16/2020    Procedure: FULGURATION, BLADDER;  Surgeon: Damari Barber Jr., MD;  Location: [facility] OR;  Service: Urology;  Laterality: N/A;    carpel tunnel surgery once on each hand      CERVICAL FUSION      x 4    cervix repair      CHOLECYSTECTOMY  2000    COLECTOMY      CYSTOSCOPY W/ RETROGRADES  6/16/2020    Procedure: CYSTOSCOPY, WITH RETROGRADE PYELOGRAM;  Surgeon: Damari Barber Jr., MD;  Location: [facility] OR;  Service: Urology;;    CYSTOSCOPY W/ RETROGRADES Right 4/20/2021    Procedure: CYSTOSCOPY, WITH RETROGRADE PYELOGRAM;  Surgeon: Damari Barber Jr., MD;  Location: [facility] OR;  Service: Urology;  Laterality: Right;    CYSTOSCOPY W/ URETERAL STENT PLACEMENT Right 3/16/2021    Procedure: CYSTOSCOPY, WITH URETERAL STENT INSERTION;  Surgeon: Damari Barber Jr., MD;  Location: [facility];  Service: Urology;  Laterality: Right;    CYSTOSCOPY W/ URETERAL STENT PLACEMENT Right 5/18/2021    Procedure: CYSTOSCOPY, WITH URETERAL STENT INSERTION;  Surgeon: Damari Barber Jr., MD;  Location: [facility] OR;  Service: Urology;  Laterality: Right;    CYSTOSCOPY W/ URETERAL STENT PLACEMENT Right 6/22/2022    Procedure: CYSTOSCOPY, WITH URETERAL STENT INSERTION;  Surgeon: Gaby Garcia MD;  Location: [facility] OR;  Service: Urology;  Laterality: Right;  cysto stent exchange    CYSTOSCOPY W/ URETERAL STENT REMOVAL Right 4/20/2021    Procedure: CYSTOSCOPY, WITH URETERAL STENT REMOVAL;  Surgeon: Damari Barber Jr., MD;  Location: [facility] OR;  Service: Urology;  Laterality: Right;    CYSTOSCOPY WITH  URETEROSCOPY, RETROGRADE PYELOGRAPHY, AND INSERTION OF STENT Right 3/30/2021    Procedure: CYSTOSCOPY, WITH RETROGRADE PYELOGRAM AND URETERAL STENT INSERTION;  Surgeon: Damari Barber Jr., MD;  Location: [facility] OR;  Service: Urology;  Laterality: Right;    CYSTOURETEROSCOPY WITH RETROGRADE PYELOGRAPHY AND INSERTION OF STENT INTO URETER Right 1/18/2022    Procedure: CYSTOURETEROSCOPY, WITH RETROGRADE PYELOGRAM AND URETERAL STENT INSERTION;  Surgeon: Damari Barber Jr., MD;  Location: [facility] OR;  Service: Urology;  Laterality: Right;    DIAGNOSTIC LAPAROSCOPY N/A 2/13/2020    Procedure: LAPAROSCOPY, DIAGNOSTIC;  Surgeon: Robbi Lovell III, MD;  Location: [facility] OR;  Service: General;  Laterality: N/A;    HYSTERECTOMY  1980    ILEOSTOMY N/A 2/13/2020    Procedure: CREATION, ILEOSTOMY Loop;  Surgeon: Robbi Lovell III, MD;  Location: [facility] OR;  Service: General;  Laterality: N/A;    ILEOSTOMY CLOSURE N/A 5/18/2020    Procedure: CLOSURE, ILEOSTOMY;  Surgeon: Robbi Lovell III, MD;  Location: [facility] OR;  Service: General;  Laterality: N/A;    LASER LITHOTRIPSY Right 3/30/2021    Procedure: LITHOTRIPSY, USING LASER;  Surgeon: Damari Barber Jr., MD;  Location: [facility];  Service: Urology;  Laterality: Right;    LUMBAR LAMINECTOMY      LYSIS OF ADHESIONS N/A 2/13/2020    Procedure: LYSIS, ADHESIONS;  Surgeon: Robbi Lovell III, MD;  Location: [facility] OR;  Service: General;  Laterality: N/A;    NECK SURGERY      PERCUTANEOUS TRANSLUMINAL ANGIOPLASTY OF ARTERIOVENOUS FISTULA N/A 1/19/2022    Procedure: PTA, AV FISTULA;  Surgeon: Raz Maher MD;  Location: [facility] CATH/EP LAB;  Service: Cardiothoracic;  Laterality: N/A;    PHLEBOGRAPHY  2/28/2020    Procedure: VENOGRAM;  Surgeon: Robbi Lovell III, MD;  Location: [facility] OR;  Service: General;;    REMOVAL OF VASCULAR ACCESS PORT Right 2/13/2020    Procedure: REMOVAL, VASCULAR ACCESS PORT;  Surgeon: Robbi Lovell III, MD;  Location: [facility] OR;   Service: General;  Laterality: Right;    RETROGRADE PYELOGRAPHY  3/16/2021    Procedure: PYELOGRAM, RETROGRADE;  Surgeon: Damari Barber Jr., MD;  Location: [facility] OR;  Service: Urology;;    RETROGRADE PYELOGRAPHY Right 5/18/2021    Procedure: PYELOGRAM, RETROGRADE;  Surgeon: Damari Barber Jr., MD;  Location: [facility] OR;  Service: Urology;  Laterality: Right;    RETROGRADE PYELOGRAPHY  6/22/2022    Procedure: PYELOGRAM, RETROGRADE;  Surgeon: Gaby Garcia MD;  Location: [facility] OR;  Service: Urology;;    SHOULDER SURGERY      URETEROSCOPIC REMOVAL OF URETERIC CALCULUS Right 3/30/2021    Procedure: REMOVAL, CALCULUS, URETER, URETEROSCOPIC;  Surgeon: Damari Barber Jr., MD;  Location: [facility] OR;  Service: Urology;  Laterality: Right;    URETEROSCOPIC REMOVAL OF URETERIC CALCULUS Right 4/20/2021    Procedure: REMOVAL, CALCULUS, URETER, URETEROSCOPIC;  Surgeon: Damari Barber Jr., MD;  Location: [facility] OR;  Service: Urology;  Laterality: Right;    vulva cancer         Review of patient's allergies indicates:   Allergen Reactions    Ciprofloxacin Other (See Comments)     Elevated liver enzymes      Pcn [penicillins] Anaphylaxis     TOLERATES ROCEPHIN WITHOUT DIFFICULTY       Current Facility-Administered Medications on File Prior to Encounter   Medication    electrolyte-S (ISOLYTE)    lactated ringers infusion    LIDOcaine (PF) 10 mg/ml (1%) injection 5 mg     Current Outpatient Medications on File Prior to Encounter   Medication Sig    albuterol-ipratropium (DUO-NEB) 2.5 mg-0.5 mg/3 mL nebulizer solution Take 3 mLs by nebulization every 6 (six) hours as needed for Wheezing or Shortness of Breath. Rescue    diltiaZEM (CARDIZEM CD) 120 MG Cp24 Take 1 capsule (120 mg total) by mouth once daily.    ferric citrate (AURYXIA) 210 mg iron Tab Take 2 tablets by mouth 3 (three) times daily with meals.    gabapentin (NEURONTIN) 100 MG capsule Take 100 mg by mouth 2 (two) times daily. HCS    hydrALAZINE  (APRESOLINE) 50 MG tablet Take 1 tablet (50 mg total) by mouth every 8 (eight) hours.    levothyroxine (SYNTHROID) 88 MCG tablet TAKE 1 TABLET BEFORE BREAKFAST (Patient taking differently: Take 88 mcg by mouth before breakfast. TAKE 1 TABLET BEFORE BREAKFAST)    LORazepam (ATIVAN) 0.5 MG tablet Take 1 tablet (0.5 mg total) by mouth every evening.    promethazine (PHENERGAN) 25 MG tablet TAKE 1 TABLET(25 MG) BY MOUTH TWICE DAILY AS NEEDED FOR NAUSEA (Patient taking differently: Take 25 mg by mouth 2 (two) times daily as needed for Nausea.)    rOPINIRole (REQUIP) 0.5 MG tablet Take 0.5 mg by mouth every 8 (eight) hours.    sertraline (ZOLOFT) 50 MG tablet Take 1 tablet (50 mg total) by mouth every evening. Take 50 mg by mouth every evening.    [DISCONTINUED] meropenem-0.9% sodium chloride 500 mg/50 mL PgBk Inject 50 mLs (500 mg total) into the vein every 12 (twelve) hours.    [DISCONTINUED] miconazole (MICOTIN) 2 % cream Apply topically 2 (two) times daily.     Family History       Problem Relation (Age of Onset)    Cancer Mother    Heart disease Mother, Father, Sister    Hypertension Mother    No Known Problems Daughter          Tobacco Use    Smoking status: Former Smoker     Packs/day: 1.00     Years: 33.00     Pack years: 33.00     Types: Cigarettes     Quit date: 2000     Years since quittin.5    Smokeless tobacco: Former User     Quit date:    Substance and Sexual Activity    Alcohol use: No    Drug use: No    Sexual activity: Not on file     Review of Systems   Constitutional:  Positive for activity change, chills and fever. Negative for diaphoresis.   HENT:  Negative for congestion, nosebleeds and tinnitus.    Eyes:  Negative for photophobia and visual disturbance.   Respiratory:  Positive for cough and shortness of breath. Negative for chest tightness and wheezing.    Cardiovascular:  Negative for chest pain, palpitations and leg swelling.   Gastrointestinal:  Positive for nausea and  vomiting. Negative for abdominal distention, abdominal pain, constipation and diarrhea.   Endocrine: Negative for cold intolerance and heat intolerance.   Genitourinary:  Negative for difficulty urinating, dysuria, frequency, hematuria and urgency.   Musculoskeletal:  Negative for arthralgias, back pain and myalgias.   Skin:  Negative for pallor, rash and wound.   Allergic/Immunologic: Negative for immunocompromised state.   Neurological:  Negative for dizziness, tremors, facial asymmetry, speech difficulty and weakness.   Hematological:  Negative for adenopathy. Does not bruise/bleed easily.   Psychiatric/Behavioral:  Negative for confusion and sleep disturbance. The patient is not nervous/anxious.    Objective:     Vital Signs (Most Recent):  Temp: 97.4 °F (36.3 °C) (06/28/22 2219)  Pulse: 92 (06/28/22 2219)  Resp: 18 (06/28/22 2219)  BP: (!) 132/58 (06/28/22 2219)  SpO2: 97 % (06/28/22 2219)   Vital Signs (24h Range):  Temp:  [97.4 °F (36.3 °C)-98.9 °F (37.2 °C)] 97.4 °F (36.3 °C)  Pulse:  [76-92] 92  Resp:  [18-20] 18  SpO2:  [94 %-100 %] 97 %  BP: (132-204)/() 132/58     Weight: 74.7 kg (164 lb 10.9 oz)  Body mass index is 25.41 kg/m².    Physical Exam  Vitals and nursing note reviewed.   Constitutional:       General: She is not in acute distress.     Appearance: She is well-developed. She is ill-appearing. She is not diaphoretic.   HENT:      Head: Normocephalic.      Mouth/Throat:      Mouth: Mucous membranes are moist.      Pharynx: Oropharynx is clear.   Eyes:      General: No scleral icterus.     Conjunctiva/sclera: Conjunctivae normal.      Pupils: Pupils are equal, round, and reactive to light.   Neck:      Vascular: No JVD.   Cardiovascular:      Rate and Rhythm: Normal rate and regular rhythm.      Heart sounds: Normal heart sounds. No murmur heard.    No friction rub. No gallop.   Pulmonary:      Effort: Respiratory distress present.      Breath sounds: Rhonchi present. No wheezing or rales.    Abdominal:      General: Bowel sounds are normal. There is no distension.      Palpations: Abdomen is soft.      Tenderness: There is no abdominal tenderness. There is no guarding or rebound.   Musculoskeletal:         General: No tenderness. Normal range of motion.      Cervical back: Normal range of motion and neck supple.   Lymphadenopathy:      Cervical: No cervical adenopathy.   Skin:     General: Skin is warm and dry.      Capillary Refill: Capillary refill takes less than 2 seconds.      Coloration: Skin is not pale.      Findings: No erythema or rash.   Neurological:      Mental Status: She is alert and oriented to person, place, and time.      Cranial Nerves: No cranial nerve deficit.      Sensory: No sensory deficit.      Coordination: Coordination normal.      Deep Tendon Reflexes: Reflexes normal.   Psychiatric:         Behavior: Behavior normal.         Thought Content: Thought content normal.         Judgment: Judgment normal.         CRANIAL NERVES     CN III, IV, VI   Pupils are equal, round, and reactive to light.     Significant Labs: All pertinent labs within the past 24 hours have been reviewed.  CBC:   Recent Labs   Lab 06/28/22  1739 06/28/22  2147   WBC 10.34 10.68   HGB 10.5* 10.6*   HCT 32.7* 32.4*    117*     CMP:   Recent Labs   Lab 06/28/22  1739      K 3.2*   *   CO2 12*   GLU 89   BUN 46*   CREATININE 7.8*   CALCIUM 8.5*   PROT 6.1   ALBUMIN 2.9*   BILITOT 0.9   ALKPHOS 293*   AST 15   ALT 30   ANIONGAP 16   EGFRNONAA 5*     Cardiac Markers:   Recent Labs   Lab 06/28/22  2147   *     Magnesium: No results for input(s): MG in the last 48 hours.    Significant Imaging: I have reviewed all pertinent imaging results/findings within the past 24 hours.    CXR  FINDINGS:  There is mild cardiomegaly in a left ventricular predominant pattern there is prominence of the pulmonary vasculature and mild perihilar infiltrate consistent with pulmonary edema and congestive  heart failure.  The peripheral lung fields are clear.  No pneumothorax is seen.     Impression:     Cardiomegaly and mild pulmonary edema consistent with congestive heart failure.    Ultrasound Leg     FINDINGS:  Left thigh veins: The common femoral, femoral, popliteal, upper greater saphenous, and deep femoral veins are patent and free of thrombus. The veins are normally compressible and have normal phasic flow and augmentation response.     Left calf veins: The visualized calf veins are patent.     Contralateral CFV: The contralateral (right) common femoral vein is patent and free of thrombus.     Miscellaneous: None     Impression:     No evidence of deep venous thrombosis in the left lower extremity.    Assessment/Plan:     * COVID  Acute   - COVID-19 testing   - Infection Control notified     - Isolation:   - Airborne, Contact and Droplet Precautions  - Cohort patients into COVID units  - N95 mask, wear eye protection  - 20 second hand hygiene              - Limit visitors per hospital policy              - Consolidating lab draws, nursing care, provider visits, and interventions    - Diagnostics: (leukopenia, hyponatremia, hyperferritinemia, elevated troponin, elevated d-dimer, age, and comorbidities are significant predictors of poor clinical outcome)  CBC, CMP, Ferritin, CRP and Portable CXR    - Management:  Supplemental O2 to maintain SpO2 >92%  Telemetry  Continuous/intermittent Pulse Ox  Albuterol treatment PRN    Advance Care Planning   Full Code             Essential hypertension  Chronic  Chronic, controlled.  Latest blood pressure and vitals reviewed-   Temp:  [97.4 °F (36.3 °C)-98.9 °F (37.2 °C)]   Pulse:  [76-92]   Resp:  [18-20]   BP: (132-204)/()   SpO2:  [94 %-100 %] .   Home meds for hypertension were reviewed and noted below.   Hypertension Medications             diltiaZEM (CARDIZEM CD) 120 MG Cp24 Take 1 capsule (120 mg total) by mouth once daily.    hydrALAZINE (APRESOLINE) 50 MG  tablet Take 1 tablet (50 mg total) by mouth every 8 (eight) hours.          While in the hospital, will manage blood pressure as follows; Continue home antihypertensive regimen    Will utilize p.r.n. blood pressure medication only if patient's blood pressure greater than  180/110 and she develops symptoms such as worsening chest pain or shortness of breath.      ESRD on dialysis MWF  Chronic  Nephrology consulted  Will dialyze tomorrow.      Anemia of chronic disease  Chronic  Patient's anemia is currently controlled. Has not received any PRBCs to date.. Etiology likely d/t chronic disease  Current CBC reviewed-   Lab Results   Component Value Date    HGB 10.6 (L) 06/28/2022    HCT 32.4 (L) 06/28/2022     Monitor serial CBC and transfuse if patient becomes hemodynamically unstable, symptomatic or H/H drops below 7/21.       Hypothyroidism  Chronic  Continue Levothyroxine          VTE Risk Mitigation (From admission, onward)         Ordered     heparin 25,000 units in dextrose 5% (100 units/ml) IV bolus from bag - ADDITIONAL PRN BOLUS - 60 units/kg  As needed (PRN)        Question:  Heparin Infusion Adjustment (DO NOT MODIFY ANSWER)  Answer:  \\[email or web address]\epic\Images\Pharmacy\HeparinInfusions\heparin HIGH INTENSITY nomogram for [facility] [id number]    06/28/22 2131     heparin 25,000 units in dextrose 5% (100 units/ml) IV bolus from bag - ADDITIONAL PRN BOLUS - 30 units/kg  As needed (PRN)        Question:  Heparin Infusion Adjustment (DO NOT MODIFY ANSWER)  Answer:  \[email or web address]\epic\Images\Pharmacy\HeparinInfusions\heparin HIGH INTENSITY nomogram for [facility] [id number]    06/28/22 2131     heparin 25,000 units in dextrose 5% 250 mL (100 units/mL) infusion HIGH INTENSITY nomogram - [facility]  Continuous        Question Answer Comment   Heparin Infusion Adjustment (DO NOT MODIFY ANSWER) \\[email or web address]\epic\Images\Pharmacy\HeparinInfusions\heparin HIGH INTENSITY nomogram for [facility] [id number].pdf    Begin at (in units/kg/hr) 18         06/28/22 2131                   Calvin Iniguez NP  Department of Hospital Medicine   [facility]

## 2022-06-29 NOTE — CONSULTS
INPATIENT NEPHROLOGY CONSULT   [facility] NEPHROLOGY    Shara Hutchins  06/29/2022    Reason for consultation:    ESRD    Chief Complaint:   Chief Complaint   Patient presents with    Cough     X 2 days - sent from [facility] for evaluation     Nasal Congestion          History of Present Illness:    Per H and P   Shara Hutchins is a 72-year-old female who presents emergency room complaining of cough, fever, chills, nausea, vomiting, shortness of breath.  The symptoms onset approximately 48 hours ago and progressively worsened.  Of note patient was recently admitted to [facility] where she was admitted for obstructive uropathy requiring ureteral stenting.  She is vaccinated for COVID.  She had a booster for COVID.  Previous medical history includes respiratory failure, anemia, DARCI, COPD, end-stage renal disease on dialysis (Monday Wednesday Friday), hypertension, vulvar cancer, and hypothyroidism.  ER workup:  CBC with baseline anemia.  CMP with potassium of 3.2 which was repleted by ER.  BUN of 46 creatinine of 7.8, bicarb of 12. COVID test positive Chest x-ray demonstrates cardiomegaly a with mild edema with no evidence of congestive heart failure.  Left leg ultrasound was negative for DVT.  Patient admitted to Hospital Medicine for observation management        Plan of Care:       Assessment:    esrd  --two dialysis nurses were unable to acces her AVF.  Consult Surgery for dialysis catheter  --continue dialysis per routine once access obtained  --fluid restrict  --renal dose medication per routine  --continue outpt medication  --continue binders with meals    Anemia  --josh with hd  --had a lot of bleeding from Midline.  Check h/h this afternoon    Covid pneumonia  --per Hospital medicine    Hypokalemia  --hold k supplement due to no dialysis access and she might need pRBC  --trend.  Check magnesium            Thank you for allowing us to participate in this patient's care. We will continue to follow.    Vital  Signs:  Temp Readings from Last 3 Encounters:   06/29/22 98.1 °F (36.7 °C) (Oral)   06/25/22 98.4 °F (36.9 °C) (Oral)   06/18/22 97.5 °F (36.4 °C) (Oral)       Pulse Readings from Last 3 Encounters:   06/29/22 74   06/25/22 75   06/18/22 96       BP Readings from Last 3 Encounters:   06/29/22 (!) 167/78   06/25/22 131/77   06/18/22 (!) 145/77       Weight:  Wt Readings from Last 3 Encounters:   06/28/22 74.7 kg (164 lb 10.9 oz)   06/25/22 66.7 kg (147 lb 0.8 oz)   06/18/22 65.5 kg (144 lb 8.2 oz)       Past Medical & Surgical History:  Past Medical History:   Diagnosis Date    Cellulitis of trunk     Perineum, numerous episodes    Chronic diarrhea 02/10/2020    Short-gut syndrome    Diabetes mellitus, type 2     pt has not had for years after losing weight    End stage renal disease on dialysis 01/07/2018    Fatigue     Hypertension     Hypothyroidism 2/10/2020    Iron deficiency anemia due to chronic blood loss 1/7/2018    Pulmonary hypertension 3/20/2020    Vulvar cancer 1/7/2019       Past Surgical History:   Procedure Laterality Date    ABDOMINAL SURGERY      ANGIOGRAPHY OF ARTERIOVENOUS SHUNT N/A 1/19/2022    Procedure: Fistulogram with Possible Intervention;  Surgeon: Raz Maher MD;  Location: [facility] CATH/EP LAB;  Service: Cardiothoracic;  Laterality: N/A;    APPENDECTOMY      AV FISTULA PLACEMENT Left 2018    BACK SURGERY      BLADDER FULGURATION N/A 6/16/2020    Procedure: FULGURATION, BLADDER;  Surgeon: Damari Barber Jr., MD;  Location: [facility] OR;  Service: Urology;  Laterality: N/A;    carpel tunnel surgery once on each hand      CERVICAL FUSION      x 4    cervix repair      CHOLECYSTECTOMY  2000    COLECTOMY      CYSTOSCOPY W/ RETROGRADES  6/16/2020    Procedure: CYSTOSCOPY, WITH RETROGRADE PYELOGRAM;  Surgeon: Damari Barber Jr., MD;  Location: [facility] OR;  Service: Urology;;    CYSTOSCOPY W/ RETROGRADES Right 4/20/2021    Procedure: CYSTOSCOPY, WITH RETROGRADE PYELOGRAM;   Surgeon: Damari Barber Jr., MD;  Location: [facility] OR;  Service: Urology;  Laterality: Right;    CYSTOSCOPY W/ URETERAL STENT PLACEMENT Right 3/16/2021    Procedure: CYSTOSCOPY, WITH URETERAL STENT INSERTION;  Surgeon: Damari Barber Jr., MD;  Location: [facility] OR;  Service: Urology;  Laterality: Right;    CYSTOSCOPY W/ URETERAL STENT PLACEMENT Right 5/18/2021    Procedure: CYSTOSCOPY, WITH URETERAL STENT INSERTION;  Surgeon: Damari Barber Jr., MD;  Location: [facility] OR;  Service: Urology;  Laterality: Right;    CYSTOSCOPY W/ URETERAL STENT PLACEMENT Right 6/22/2022    Procedure: CYSTOSCOPY, WITH URETERAL STENT INSERTION;  Surgeon: Gaby Garcia MD;  Location: [facility] OR;  Service: Urology;  Laterality: Right;  cysto stent exchange    CYSTOSCOPY W/ URETERAL STENT REMOVAL Right 4/20/2021    Procedure: CYSTOSCOPY, WITH URETERAL STENT REMOVAL;  Surgeon: Damari Barber Jr., MD;  Location: [facility] OR;  Service: Urology;  Laterality: Right;    CYSTOSCOPY WITH URETEROSCOPY, RETROGRADE PYELOGRAPHY, AND INSERTION OF STENT Right 3/30/2021    Procedure: CYSTOSCOPY, WITH RETROGRADE PYELOGRAM AND URETERAL STENT INSERTION;  Surgeon: Damari Barber Jr., MD;  Location: [facility] OR;  Service: Urology;  Laterality: Right;    CYSTOURETEROSCOPY WITH RETROGRADE PYELOGRAPHY AND INSERTION OF STENT INTO URETER Right 1/18/2022    Procedure: CYSTOURETEROSCOPY, WITH RETROGRADE PYELOGRAM AND URETERAL STENT INSERTION;  Surgeon: Damari Barber Jr., MD;  Location: [facility] OR;  Service: Urology;  Laterality: Right;    DIAGNOSTIC LAPAROSCOPY N/A 2/13/2020    Procedure: LAPAROSCOPY, DIAGNOSTIC;  Surgeon: Robbi Lovell III, MD;  Location: [facility] OR;  Service: General;  Laterality: N/A;    HYSTERECTOMY  1980    ILEOSTOMY N/A 2/13/2020    Procedure: CREATION, ILEOSTOMY Loop;  Surgeon: Robbi Lovell III, MD;  Location: [facility] OR;  Service: General;  Laterality: N/A;    ILEOSTOMY CLOSURE N/A 5/18/2020    Procedure: CLOSURE, ILEOSTOMY;  Surgeon:  Robbi Lovell III, MD;  Location: [facility] OR;  Service: General;  Laterality: N/A;    LASER LITHOTRIPSY Right 3/30/2021    Procedure: LITHOTRIPSY, USING LASER;  Surgeon: Damari Barber Jr., MD;  Location: [facility] OR;  Service: Urology;  Laterality: Right;    LUMBAR LAMINECTOMY      LYSIS OF ADHESIONS N/A 2/13/2020    Procedure: LYSIS, ADHESIONS;  Surgeon: Robbi Lovell III, MD;  Location: [facility] OR;  Service: General;  Laterality: N/A;    NECK SURGERY      PERCUTANEOUS TRANSLUMINAL ANGIOPLASTY OF ARTERIOVENOUS FISTULA N/A 1/19/2022    Procedure: PTA, AV FISTULA;  Surgeon: Raz Maher MD;  Location: [facility] CATH/EP LAB;  Service: Cardiothoracic;  Laterality: N/A;    PHLEBOGRAPHY  2/28/2020    Procedure: VENOGRAM;  Surgeon: Robbi Lovell III, MD;  Location: [facility] OR;  Service: General;;    REMOVAL OF VASCULAR ACCESS PORT Right 2/13/2020    Procedure: REMOVAL, VASCULAR ACCESS PORT;  Surgeon: Robbi Lovell III, MD;  Location: [facility] OR;  Service: General;  Laterality: Right;    RETROGRADE PYELOGRAPHY  3/16/2021    Procedure: PYELOGRAM, RETROGRADE;  Surgeon: Damari Barber Jr., MD;  Location: [facility] OR;  Service: Urology;;    RETROGRADE PYELOGRAPHY Right 5/18/2021    Procedure: PYELOGRAM, RETROGRADE;  Surgeon: Damari Barber Jr., MD;  Location: [facility] OR;  Service: Urology;  Laterality: Right;    RETROGRADE PYELOGRAPHY  6/22/2022    Procedure: PYELOGRAM, RETROGRADE;  Surgeon: Gaby Garcia MD;  Location: [facility] OR;  Service: Urology;;    SHOULDER SURGERY      URETEROSCOPIC REMOVAL OF URETERIC CALCULUS Right 3/30/2021    Procedure: REMOVAL, CALCULUS, URETER, URETEROSCOPIC;  Surgeon: Damari Barber Jr., MD;  Location: [facility] OR;  Service: Urology;  Laterality: Right;    URETEROSCOPIC REMOVAL OF URETERIC CALCULUS Right 4/20/2021    Procedure: REMOVAL, CALCULUS, URETER, URETEROSCOPIC;  Surgeon: Damari Barber Jr., MD;  Location: [facility] OR;  Service: Urology;  Laterality: Right;    vulva cancer          Past Social History:  Social History     Socioeconomic History    Marital status:    Tobacco Use    Smoking status: Former Smoker     Packs/day: 1.00     Years: 33.00     Pack years: 33.00     Types: Cigarettes     Quit date: 2000     Years since quittin.5    Smokeless tobacco: Former User     Quit date:    Substance and Sexual Activity    Alcohol use: No    Drug use: No       Medications:  Current Facility-Administered Medications on File Prior to Encounter   Medication Dose Route Frequency Provider Last Rate Last Admin    electrolyte-S (ISOLYTE)   Intravenous Continuous Raz Ruff MD        lactated ringers infusion   Intravenous Continuous Ruy Beltre MD        LIDOcaine (PF) 10 mg/ml (1%) injection 5 mg  0.5 mL Intradermal Once Raz Ruff MD         Current Outpatient Medications on File Prior to Encounter   Medication Sig Dispense Refill    albuterol-ipratropium (DUO-NEB) 2.5 mg-0.5 mg/3 mL nebulizer solution Take 3 mLs by nebulization every 6 (six) hours as needed for Wheezing or Shortness of Breath. Rescue 75 mL 0    diltiaZEM (CARDIZEM CD) 120 MG Cp24 Take 1 capsule (120 mg total) by mouth once daily. 90 capsule 3    ferric citrate (AURYXIA) 210 mg iron Tab Take 2 tablets by mouth 3 (three) times daily with meals.      gabapentin (NEURONTIN) 100 MG capsule Take 100 mg by mouth 2 (two) times daily. HCS      hydrALAZINE (APRESOLINE) 50 MG tablet Take 1 tablet (50 mg total) by mouth every 8 (eight) hours. 270 tablet 0    levothyroxine (SYNTHROID) 88 MCG tablet TAKE 1 TABLET BEFORE BREAKFAST (Patient taking differently: Take 88 mcg by mouth before breakfast. TAKE 1 TABLET BEFORE BREAKFAST) 90 tablet 3    LORazepam (ATIVAN) 0.5 MG tablet Take 1 tablet (0.5 mg total) by mouth every evening. 90 tablet 1    promethazine (PHENERGAN) 25 MG tablet TAKE 1 TABLET(25 MG) BY MOUTH TWICE DAILY AS NEEDED FOR NAUSEA (Patient taking differently: Take 25 mg by mouth  "2 (two) times daily as needed for Nausea.) 60 tablet 0    rOPINIRole (REQUIP) 0.5 MG tablet Take 0.5 mg by mouth every 8 (eight) hours.      sertraline (ZOLOFT) 50 MG tablet Take 1 tablet (50 mg total) by mouth every evening. Take 50 mg by mouth every evening. 90 tablet 1    [DISCONTINUED] miconazole (MICOTIN) 2 % cream Apply topically 2 (two) times daily. 28 g 0     Scheduled Meds:   albuterol  2 puff Inhalation Q6H    ascorbic acid (vitamin C)  500 mg Oral BID    diltiaZEM  120 mg Oral Daily    epoetin nica-epbx  100 Units/kg Intravenous Every Mon, Wed, Fri    gabapentin  100 mg Oral BID    hydrALAZINE  50 mg Oral Q8H    levothyroxine  88 mcg Oral Before breakfast    LORazepam  0.5 mg Oral QHS    multivitamin  1 tablet Oral Daily    mupirocin   Nasal BID    rOPINIRole  0.5 mg Oral Q8H    sertraline  50 mg Oral QHS    sevelamer carbonate  800 mg Oral TID WM     Continuous Infusions:  PRN Meds:.acetaminophen, acetaminophen, benzonatate, dextrose 10%, dextrose 10%, glucagon (human recombinant), glucose, glucose, guaiFENesin 100 mg/5 ml, HYDROcodone-acetaminophen, insulin aspart U-100, ondansetron, sodium chloride 0.9%    Allergies:  Ciprofloxacin and Pcn [penicillins]    Past Family History:  Reviewed; refer to Hospitalist Admission Note    Review of Systems:  Review of Systems - All 14 systems reviewed and negative, except as noted in HPI    Physical Exam:    BP (!) 167/78 (BP Location: Right leg, Patient Position: Lying)   Pulse 74   Temp 98.1 °F (36.7 °C) (Oral)   Resp 17   Ht 5' 7.5" (1.715 m)   Wt 74.7 kg (164 lb 10.9 oz)   SpO2 96%   BMI 25.41 kg/m²     General Appearance:    Alert, cooperative, no distress, appears stated age   Head:    Normocephalic, without obvious abnormality, atraumatic   Eyes:    PER, conjunctiva/corneas clear, EOM's intact in both eyes        Throat:   Lips, mucosa, and tongue normal; teeth and gums normal   Back:     Symmetric, no curvature, ROM normal, no CVA " tenderness   Lungs:     Clear to auscultation bilaterally, respirations unlabored   Chest wall:    No tenderness or deformity   Heart:    Regular rate and rhythm, S1 and S2 normal, no murmur, rub   or gallop   Abdomen:     Soft, non-tender, bowel sounds active all four quadrants,     no masses, no organomegaly   Extremities:   Extremities normal, atraumatic, no cyanosis or edema   Pulses:   2+ and symmetric all extremities   MSK:   No joint or muscle swelling, tenderness or deformity   Skin:   Skin color, texture, turgor normal, no rashes or lesions   Neurologic:   CNII-XII intact, normal strength and sensation       Throughout.  No flap     Results:  Lab Results   Component Value Date     06/28/2022    K 3.2 (L) 06/28/2022     (H) 06/28/2022    CO2 12 (L) 06/28/2022    BUN 46 (H) 06/28/2022    CREATININE 7.8 (H) 06/28/2022    CALCIUM 8.5 (L) 06/28/2022    ANIONGAP 16 06/28/2022    ESTGFRAFRICA 5 (A) 06/28/2022    EGFRNONAA 5 (A) 06/28/2022       Lab Results   Component Value Date    CALCIUM 8.5 (L) 06/28/2022    PHOS 4.0 06/18/2022       Recent Labs   Lab 06/29/22  0516   WBC 8.21   RBC 2.85*   HGB 10.0*   HCT 31.3*   *   *   MCH 35.1*   MCHC 31.9*          I have personally reviewed pertinent radiological imaging and reports.

## 2022-06-29 NOTE — CONSULTS
Consulted for incontinence associated skin dermatitis to the perineal area and inner buttocks areas. Patient had applied calmoseptine ointment that she brought from home as this is a chronic problem due to diarrhea brought on by chemotherapy treatment. Educated the patient on using Triad rather than calmoseptine ointment as this will protect her skin better from the bodily fluid issues. Patient verbalized understanding. Left a cup with Triad in place in the patients bathroom per patients request. Wound care will continue to follow up with this patient throughout her hospital stay.

## 2022-06-29 NOTE — PLAN OF CARE
Patient progressing towards discharge.    Patient had no acute events noted. Covid isolation remains in effect.  Heparin drip continued per MD order, awaiting APTT results from this morning to see if changes are needed.  Patient has had issues with coughing overnight relieved with guaifenesin and headaches relieved with hydrocodone.  Patient ambulatory to bathroom without assistance.  Patient does void but is a hemodialysis patient due for dialysis today.  Patient has chronic diarrhea.  Excoriation noted to groin and butt from chronic diarrhea.  Discussed POC with patient and family with verbalization of understanding.    Will continue to monitor.

## 2022-06-29 NOTE — PROGRESS NOTES
VQ scan negative. Given bleeding issues heparin gtt to be discontinued. Patient not having symptoms in 3 limbs not previously with doppler examination. USs requested and will resume if needed if DVT is identified once bleeding is controlled.

## 2022-06-30 NOTE — PROGRESS NOTES
"[facility]  [facility]  Daily Progress Note      Recent 24-Hour Events    Feels she is improving but still not at baseline. Still feels short of breath. No chest pain, lightheadedness. Tolerating oral intake.       PHYSICAL EXAM    Vitals: BP (!) 126/58 (BP Location: Right arm, Patient Position: Lying)   Pulse 74   Temp 98.3 °F (36.8 °C) (Oral)   Resp 19   Ht 5' 7.5" (1.715 m)   Wt 74.7 kg (164 lb 10.9 oz)   SpO2 (!) 94%   BMI 25.41 kg/m²  Body mass index is 25.41 kg/m².    General: NAD, AO3  HEENT: PERRL, EOMI.   Cardiovascular: RRR  Respiratory: wheezing throughout   GI: abdomen S/NT/ND, +BS  Extremities: no edema  MSK: moves upper extremities.   Neuro/Psych: A&OX3. CNII-XII grossly intact.          Medications      Scheduled Meds:   albuterol-ipratropium  3 mL Nebulization Q4H WAKE    ascorbic acid (vitamin C)  500 mg Oral BID    dexAMETHasone  6 mg Oral Daily    diltiaZEM  120 mg Oral Daily    epoetin nica-epbx  100 Units/kg Intravenous Every Mon, Wed, Fri    gabapentin  100 mg Oral BID    hydrALAZINE  50 mg Oral Q8H    levothyroxine  88 mcg Oral Before breakfast    LORazepam  0.5 mg Oral QHS    multivitamin  1 tablet Oral Daily    mupirocin   Nasal BID    rOPINIRole  0.5 mg Oral Q8H    sertraline  50 mg Oral QHS    sevelamer carbonate  800 mg Oral TID WM     Continuous Infusions:  PRN Meds:.acetaminophen, acetaminophen, benzonatate, dextrose 10%, dextrose 10%, glucagon (human recombinant), glucose, glucose, guaiFENesin 100 mg/5 ml, HYDROcodone-acetaminophen, insulin aspart U-100, ondansetron, sodium chloride 0.9%        Labs & Diagnostics    Labs:      Recent Labs     06/28/22  1739 06/28/22  2147 06/29/22  0516 06/29/22  1600 06/30/22  0458   WBC 10.34 10.68 8.21 10.06 6.78  6.66   HGB 10.5* 10.6* 10.0* 10.4* 9.5*  9.3*   HCT 32.7* 32.4* 31.3* 31.0* 27.9*  28.7*   * 111* 110* 108* 106*  107*    117* 124* 142* 142*  145*     --   --   --  142  " 142   K 3.2*  --   --   --  3.6  3.6   CO2 12*  --   --   --  21*  21*   BUN 46*  --   --   --  35*  35*   CREATININE 7.8*  --   --   --  6.3*  6.3*   ANIONGAP 16  --   --   --  14  14   MG  --   --   --   --  1.8  1.8   PHOS  --   --   --   --  6.7*  6.7*   ALT 30  --   --   --  17   AST 15  --   --   --  12   ALKPHOS 293*  --   --   --  207*   BILITOT 0.9  --   --   --  0.7   PROT 6.1  --   --   --  5.3*   INR  --  1.2  1.2  --   --   --           EKG, labs and radiographs from the past 24h reviewed and personally interpreted.       Assessment & Plan         * COVID  Acute   - COVID-19 testing   - Infection Control notified      - Isolation:   - Airborne, Contact and Droplet Precautions  - Cohort patients into COVID units  - N95 mask, wear eye protection  - 20 second hand hygiene              - Limit visitors per hospital policy              - Consolidating lab draws, nursing care, provider visits, and interventions     - Diagnostics: (leukopenia, hyponatremia, hyperferritinemia, elevated troponin, elevated d-dimer, age, and comorbidities are significant predictors of poor clinical outcome)  CBC, CMP, Ferritin, CRP and Portable CXR     - Management:  Supplemental O2 to maintain SpO2 >92%  Telemetry  Continuous/intermittent Pulse Ox  Albuterol treatment PRN     Advance Care Planning   Full Code   Dexamethasone as intermittently at 94% or below.               Essential hypertension  Chronic  Chronic, controlled.  Latest blood pressure and vitals reviewed-   Temp:  [97.4 °F (36.3 °C)-98.9 °F (37.2 °C)]   Pulse:  [76-92]   Resp:  [18-20]   BP: (132-204)/()   SpO2:  [94 %-100 %] .   Home meds for hypertension were reviewed and noted below.         Hypertension Medications                 diltiaZEM (CARDIZEM CD) 120 MG Cp24 Take 1 capsule (120 mg total) by mouth once daily.     hydrALAZINE (APRESOLINE) 50 MG tablet Take 1 tablet (50 mg total) by mouth every 8 (eight) hours.             While in the  hospital, will manage blood pressure as follows; Continue home antihypertensive regimen     Will utilize p.r.n. blood pressure medication only if patient's blood pressure greater than  180/110 and she develops symptoms such as worsening chest pain or shortness of breath.        ESRD on dialysis MWF  Chronic  Nephrology consulted  Will dialyze tomorrow again        Anemia of chronic disease  Chronic  Patient's anemia is currently controlled. Has not received any PRBCs to date.. Etiology likely d/t chronic disease  Current CBC reviewed-         Lab Results   Component Value Date     HGB 10.6 (L) 06/28/2022     HCT 32.4 (L) 06/28/2022      Monitor serial CBC and transfuse if patient becomes hemodynamically unstable, symptomatic or H/H drops below 7/21.         Hypothyroidism  Chronic  Continue Levothyroxine          Hyperphosphatemia   -on sevelamer     Elevated ddimer  -VQ scan, 4 extremity doppler unremarkable. Given bleeding with dialysis yesterday, heparin gtt stopped.                  Dispo/Code status: Full Code.     ?    Christiane Brice    Date: 6/30/2022    Time: 5:48 PM

## 2022-06-30 NOTE — PLAN OF CARE
Plan of care reviewed with patient, patient states understanding. Patient A&Ox4. Patients IV clean, dry and intact. Patient on room air. C/o pain controlled by PRN medication. COVID precautions maintained. Bed alarm on. Bed in low, locked position with call button within reach.

## 2022-06-30 NOTE — PLAN OF CARE
POC reviewed with patient. PT verbalized understandins. PT on room air. No complaints of pain. Vitals stable. Still experiencing chest discomfort when breathing. Pt educated concerning call light, incentive spirometer and all meds given. Isolation precautions maintained. PT remains free of falls.Bed is low and locked rails up x2.

## 2022-06-30 NOTE — PROGRESS NOTES
06/29/22 1910   Handoff Report   Received From Arlette Johnson To Sadie   Vital Signs   Temp 97.8 °F (36.6 °C)   Pulse 80   Resp 18   SpO2 96 %   BP (!) 158/70   Post-Hemodialysis Assessment   Rinseback Volume (mL) 250 mL   Blood Volume Processed (Liters) 58.9 L   Dialyzer Clearance Lightly streaked   Duration of Treatment 180 minutes   Additional Fluid Intake (mL) 500 mL   Total UF (mL) 3000 mL   Net Fluid Removal 2500   Patient Response to Treatment tolerated well   Post-Hemodialysis Comments tx complete

## 2022-06-30 NOTE — PROGRESS NOTES
INPATIENT NEPHROLOGY CONSULT   [facility] NEPHROLOGY    Shara Hutchins  06/30/2022    Reason for consultation:    ESRD    Chief Complaint:   Chief Complaint   Patient presents with    Cough     X 2 days - sent from [facility] for evaluation     Nasal Congestion          History of Present Illness:    Per YOSVANY and P   Shara Hutchins is a 72-year-old female who presents emergency room complaining of cough, fever, chills, nausea, vomiting, shortness of breath.  The symptoms onset approximately 48 hours ago and progressively worsened.  Of note patient was recently admitted to [facility] where she was admitted for obstructive uropathy requiring ureteral stenting.  She is vaccinated for COVID.  She had a booster for COVID.  Previous medical history includes respiratory failure, anemia, DARCI, COPD, end-stage renal disease on dialysis (Monday Wednesday Friday), hypertension, vulvar cancer, and hypothyroidism.  ER workup:  CBC with baseline anemia.  CMP with potassium of 3.2 which was repleted by ER.  BUN of 46 creatinine of 7.8, bicarb of 12. COVID test positive Chest x-ray demonstrates cardiomegaly a with mild edema with no evidence of congestive heart failure.  Left leg ultrasound was negative for DVT.  Patient admitted to Hospital Medicine for observation management    6/30  AFVSS.  AVF was finally able to be used yesterday. She feels very weak today    Plan of Care:       Assessment:    esrd  --cancel surgery consult for trialysis catheter  --continue dialysis per routine once access obtained  --fluid restrict  --renal dose medication per routine  --continue outpt medication  --continue binders with meals    Anemia  --josh with hd  --had a lot of bleeding from Midline.   Hg 9.5 today    Covid pneumonia  --per Hospital medicine    Hypokalemia  --resolved.  Mg ok            Thank you for allowing us to participate in this patient's care. We will continue to follow.    Vital Signs:  Temp Readings from Last 3 Encounters:   06/30/22  97.5 °F (36.4 °C) (Oral)   06/25/22 98.4 °F (36.9 °C) (Oral)   06/18/22 97.5 °F (36.4 °C) (Oral)       Pulse Readings from Last 3 Encounters:   06/30/22 68   06/25/22 75   06/18/22 96       BP Readings from Last 3 Encounters:   06/30/22 131/60   06/25/22 131/77   06/18/22 (!) 145/77       Weight:  Wt Readings from Last 3 Encounters:   06/28/22 74.7 kg (164 lb 10.9 oz)   06/25/22 66.7 kg (147 lb 0.8 oz)   06/18/22 65.5 kg (144 lb 8.2 oz)       Past Medical & Surgical History:  Past Medical History:   Diagnosis Date    Cellulitis of trunk     Perineum, numerous episodes    Chronic diarrhea 02/10/2020    Short-gut syndrome    Diabetes mellitus, type 2     pt has not had for years after losing weight    End stage renal disease on dialysis 01/07/2018    Fatigue     Hypertension     Hypothyroidism 2/10/2020    Iron deficiency anemia due to chronic blood loss 1/7/2018    Pulmonary hypertension 3/20/2020    Vulvar cancer 1/7/2019       Past Surgical History:   Procedure Laterality Date    ABDOMINAL SURGERY      ANGIOGRAPHY OF ARTERIOVENOUS SHUNT N/A 1/19/2022    Procedure: Fistulogram with Possible Intervention;  Surgeon: Raz Maher MD;  Location: [facility] CATH/EP LAB;  Service: Cardiothoracic;  Laterality: N/A;    APPENDECTOMY      AV FISTULA PLACEMENT Left 2018    BACK SURGERY      BLADDER FULGURATION N/A 6/16/2020    Procedure: FULGURATION, BLADDER;  Surgeon: Damari Barber Jr., MD;  Location: [facility] OR;  Service: Urology;  Laterality: N/A;    carpel tunnel surgery once on each hand      CERVICAL FUSION      x 4    cervix repair      CHOLECYSTECTOMY  2000    COLECTOMY      CYSTOSCOPY W/ RETROGRADES  6/16/2020    Procedure: CYSTOSCOPY, WITH RETROGRADE PYELOGRAM;  Surgeon: Damari Barber Jr., MD;  Location: [facility] OR;  Service: Urology;;    CYSTOSCOPY W/ RETROGRADES Right 4/20/2021    Procedure: CYSTOSCOPY, WITH RETROGRADE PYELOGRAM;  Surgeon: Damari Barber Jr., MD;  Location: [facility] OR;  Service:  Urology;  Laterality: Right;    CYSTOSCOPY W/ URETERAL STENT PLACEMENT Right 3/16/2021    Procedure: CYSTOSCOPY, WITH URETERAL STENT INSERTION;  Surgeon: Damari Barber Jr., MD;  Location: [facility] OR;  Service: Urology;  Laterality: Right;    CYSTOSCOPY W/ URETERAL STENT PLACEMENT Right 5/18/2021    Procedure: CYSTOSCOPY, WITH URETERAL STENT INSERTION;  Surgeon: Damari Barber Jr., MD;  Location: [facility] OR;  Service: Urology;  Laterality: Right;    CYSTOSCOPY W/ URETERAL STENT PLACEMENT Right 6/22/2022    Procedure: CYSTOSCOPY, WITH URETERAL STENT INSERTION;  Surgeon: Gaby Garcia MD;  Location: [facility] OR;  Service: Urology;  Laterality: Right;  cysto stent exchange    CYSTOSCOPY W/ URETERAL STENT REMOVAL Right 4/20/2021    Procedure: CYSTOSCOPY, WITH URETERAL STENT REMOVAL;  Surgeon: Damari Barber Jr., MD;  Location: [facility] OR;  Service: Urology;  Laterality: Right;    CYSTOSCOPY WITH URETEROSCOPY, RETROGRADE PYELOGRAPHY, AND INSERTION OF STENT Right 3/30/2021    Procedure: CYSTOSCOPY, WITH RETROGRADE PYELOGRAM AND URETERAL STENT INSERTION;  Surgeon: Damari Barber Jr., MD;  Location: [facility] OR;  Service: Urology;  Laterality: Right;    CYSTOURETEROSCOPY WITH RETROGRADE PYELOGRAPHY AND INSERTION OF STENT INTO URETER Right 1/18/2022    Procedure: CYSTOURETEROSCOPY, WITH RETROGRADE PYELOGRAM AND URETERAL STENT INSERTION;  Surgeon: Damari Barber Jr., MD;  Location: [facility] OR;  Service: Urology;  Laterality: Right;    DIAGNOSTIC LAPAROSCOPY N/A 2/13/2020    Procedure: LAPAROSCOPY, DIAGNOSTIC;  Surgeon: Robbi Lovell III, MD;  Location: [facility] OR;  Service: General;  Laterality: N/A;    HYSTERECTOMY  1980    ILEOSTOMY N/A 2/13/2020    Procedure: CREATION, ILEOSTOMY Loop;  Surgeon: Robbi Lovell III, MD;  Location: [facility] OR;  Service: General;  Laterality: N/A;    ILEOSTOMY CLOSURE N/A 5/18/2020    Procedure: CLOSURE, ILEOSTOMY;  Surgeon: Robbi Lovell III, MD;  Location: [facility] OR;  Service:  General;  Laterality: N/A;    LASER LITHOTRIPSY Right 3/30/2021    Procedure: LITHOTRIPSY, USING LASER;  Surgeon: Damari Barber Jr., MD;  Location: [facility] OR;  Service: Urology;  Laterality: Right;    LUMBAR LAMINECTOMY      LYSIS OF ADHESIONS N/A 2/13/2020    Procedure: LYSIS, ADHESIONS;  Surgeon: Robbi Lovell III, MD;  Location: [facility] OR;  Service: General;  Laterality: N/A;    NECK SURGERY      PERCUTANEOUS TRANSLUMINAL ANGIOPLASTY OF ARTERIOVENOUS FISTULA N/A 1/19/2022    Procedure: PTA, AV FISTULA;  Surgeon: Raz Maher MD;  Location: [facility] CATH/EP LAB;  Service: Cardiothoracic;  Laterality: N/A;    PHLEBOGRAPHY  2/28/2020    Procedure: VENOGRAM;  Surgeon: Robbi Lovell III, MD;  Location: [facility] OR;  Service: General;;    REMOVAL OF VASCULAR ACCESS PORT Right 2/13/2020    Procedure: REMOVAL, VASCULAR ACCESS PORT;  Surgeon: Robbi Lovell III, MD;  Location: [facility] OR;  Service: General;  Laterality: Right;    RETROGRADE PYELOGRAPHY  3/16/2021    Procedure: PYELOGRAM, RETROGRADE;  Surgeon: Damari Barber Jr., MD;  Location: [facility] OR;  Service: Urology;;    RETROGRADE PYELOGRAPHY Right 5/18/2021    Procedure: PYELOGRAM, RETROGRADE;  Surgeon: Damari Barber Jr., MD;  Location: [facility] OR;  Service: Urology;  Laterality: Right;    RETROGRADE PYELOGRAPHY  6/22/2022    Procedure: PYELOGRAM, RETROGRADE;  Surgeon: Gaby Gracia MD;  Location: [facility] OR;  Service: Urology;;    SHOULDER SURGERY      URETEROSCOPIC REMOVAL OF URETERIC CALCULUS Right 3/30/2021    Procedure: REMOVAL, CALCULUS, URETER, URETEROSCOPIC;  Surgeon: Damari Barber Jr., MD;  Location: [facility] OR;  Service: Urology;  Laterality: Right;    URETEROSCOPIC REMOVAL OF URETERIC CALCULUS Right 4/20/2021    Procedure: REMOVAL, CALCULUS, URETER, URETEROSCOPIC;  Surgeon: Damari Barber Jr., MD;  Location: [facility] OR;  Service: Urology;  Laterality: Right;    vulva cancer         Past Social History:  Social History     Socioeconomic  History    Marital status:    Tobacco Use    Smoking status: Former Smoker     Packs/day: 1.00     Years: 33.00     Pack years: 33.00     Types: Cigarettes     Quit date: 2000     Years since quittin.5    Smokeless tobacco: Former User     Quit date:    Substance and Sexual Activity    Alcohol use: No    Drug use: No       Medications:  Current Facility-Administered Medications on File Prior to Encounter   Medication Dose Route Frequency Provider Last Rate Last Admin    electrolyte-S (ISOLYTE)   Intravenous Continuous Raz Ruff MD        lactated ringers infusion   Intravenous Continuous Ruy Beltre MD        LIDOcaine (PF) 10 mg/ml (1%) injection 5 mg  0.5 mL Intradermal Once Raz Ruff MD         Current Outpatient Medications on File Prior to Encounter   Medication Sig Dispense Refill    albuterol-ipratropium (DUO-NEB) 2.5 mg-0.5 mg/3 mL nebulizer solution Take 3 mLs by nebulization every 6 (six) hours as needed for Wheezing or Shortness of Breath. Rescue 75 mL 0    diltiaZEM (CARDIZEM CD) 120 MG Cp24 Take 1 capsule (120 mg total) by mouth once daily. 90 capsule 3    ferric citrate (AURYXIA) 210 mg iron Tab Take 2 tablets by mouth 3 (three) times daily with meals.      gabapentin (NEURONTIN) 100 MG capsule Take 100 mg by mouth 2 (two) times daily. HCS      hydrALAZINE (APRESOLINE) 50 MG tablet Take 1 tablet (50 mg total) by mouth every 8 (eight) hours. 270 tablet 0    levothyroxine (SYNTHROID) 88 MCG tablet TAKE 1 TABLET BEFORE BREAKFAST (Patient taking differently: Take 88 mcg by mouth before breakfast. TAKE 1 TABLET BEFORE BREAKFAST) 90 tablet 3    LORazepam (ATIVAN) 0.5 MG tablet Take 1 tablet (0.5 mg total) by mouth every evening. 90 tablet 1    promethazine (PHENERGAN) 25 MG tablet TAKE 1 TABLET(25 MG) BY MOUTH TWICE DAILY AS NEEDED FOR NAUSEA (Patient taking differently: Take 25 mg by mouth 2 (two) times daily as needed for Nausea.) 60 tablet 0     "rOPINIRole (REQUIP) 0.5 MG tablet Take 0.5 mg by mouth every 8 (eight) hours.      sertraline (ZOLOFT) 50 MG tablet Take 1 tablet (50 mg total) by mouth every evening. Take 50 mg by mouth every evening. 90 tablet 1    [DISCONTINUED] miconazole (MICOTIN) 2 % cream Apply topically 2 (two) times daily. 28 g 0     Scheduled Meds:   albuterol  2 puff Inhalation Q6H    ascorbic acid (vitamin C)  500 mg Oral BID    dexAMETHasone  6 mg Oral Daily    diltiaZEM  120 mg Oral Daily    epoetin nica-epbx  100 Units/kg Intravenous Every Mon, Wed, Fri    gabapentin  100 mg Oral BID    hydrALAZINE  50 mg Oral Q8H    levothyroxine  88 mcg Oral Before breakfast    LORazepam  0.5 mg Oral QHS    multivitamin  1 tablet Oral Daily    mupirocin   Nasal BID    rOPINIRole  0.5 mg Oral Q8H    sertraline  50 mg Oral QHS    sevelamer carbonate  800 mg Oral TID WM     Continuous Infusions:  PRN Meds:.acetaminophen, acetaminophen, benzonatate, dextrose 10%, dextrose 10%, glucagon (human recombinant), glucose, glucose, guaiFENesin 100 mg/5 ml, HYDROcodone-acetaminophen, insulin aspart U-100, ondansetron, sodium chloride 0.9%    Allergies:  Ciprofloxacin and Pcn [penicillins]    Past Family History:  Reviewed; refer to Hospitalist Admission Note    Review of Systems:  Review of Systems - All 14 systems reviewed and negative, except as noted in HPI    Physical Exam:    /60 (BP Location: Right arm, Patient Position: Lying)   Pulse 68   Temp 97.5 °F (36.4 °C) (Oral)   Resp 18   Ht 5' 7.5" (1.715 m)   Wt 74.7 kg (164 lb 10.9 oz)   SpO2 97%   BMI 25.41 kg/m²     General Appearance:    Alert, cooperative, no distress, appears stated age   Head:    Normocephalic, without obvious abnormality, atraumatic   Eyes:    PER, conjunctiva/corneas clear, EOM's intact in both eyes        Throat:   Lips, mucosa, and tongue normal; teeth and gums normal   Back:     Symmetric, no curvature, ROM normal, no CVA tenderness   Lungs:     Clear " to auscultation bilaterally, respirations unlabored   Chest wall:    No tenderness or deformity   Heart:    Regular rate and rhythm, S1 and S2 normal, no murmur, rub   or gallop   Abdomen:     Soft, non-tender, bowel sounds active all four quadrants,     no masses, no organomegaly   Extremities:   Extremities normal, atraumatic, no cyanosis or edema   Pulses:   2+ and symmetric all extremities   MSK:   No joint or muscle swelling, tenderness or deformity   Skin:   Skin color, texture, turgor normal, no rashes or lesions   Neurologic:   CNII-XII intact, normal strength and sensation       Throughout.  No flap     Results:  Lab Results   Component Value Date     06/30/2022     06/30/2022    K 3.6 06/30/2022    K 3.6 06/30/2022     06/30/2022     06/30/2022    CO2 21 (L) 06/30/2022    CO2 21 (L) 06/30/2022    BUN 35 (H) 06/30/2022    BUN 35 (H) 06/30/2022    CREATININE 6.3 (H) 06/30/2022    CREATININE 6.3 (H) 06/30/2022    CALCIUM 8.4 (L) 06/30/2022    CALCIUM 8.4 (L) 06/30/2022    ANIONGAP 14 06/30/2022    ANIONGAP 14 06/30/2022    ESTGFRAFRICA 7 (A) 06/30/2022    ESTGFRAFRICA 7 (A) 06/30/2022    EGFRNONAA 6 (A) 06/30/2022    EGFRNONAA 6 (A) 06/30/2022       Lab Results   Component Value Date    CALCIUM 8.4 (L) 06/30/2022    CALCIUM 8.4 (L) 06/30/2022    PHOS 6.7 (H) 06/30/2022    PHOS 6.7 (H) 06/30/2022       Recent Labs   Lab 06/30/22  0458   WBC 6.78  6.66   RBC 2.63*  2.69*   HGB 9.5*  9.3*   HCT 27.9*  28.7*   *  145*   *  107*   MCH 36.1*  34.6*   MCHC 34.1  32.4          I have personally reviewed pertinent radiological imaging and reports.

## 2022-06-30 NOTE — DISCHARGE SUMMARY
[facility] Hospital Medicine  Discharge Summary      Patient Name: Shara Hutchins  MRN: [id number]  Admission Date: 6/28/2022  Hospital Length of Stay: 1 days  Discharge Date and Time:  07/01/2022 8:58 AM  Attending Physician: Christiane Brice MD   Discharging Provider: Christiane Brice MD  Primary Care Provider: April Horton MD        HPI:       72-year-old female who presents emergency room complaining of cough, fever, chills, nausea, vomiting, shortness of breath.  The symptoms onset approximately 48 hours ago and progressively worsened.  Of note patient was recently admitted to [facility] where she was admitted for obstructive uropathy requiring ureteral stenting.  She is vaccinated for COVID.  She had a booster for COVID.  Previous medical history includes respiratory failure, anemia, DARCI, COPD, end-stage renal disease on dialysis (Monday Wednesday Friday), hypertension, vulvar cancer, and hypothyroidism.  ER workup:  CBC with baseline anemia.  CMP with potassium of 3.2 which was repleted by ER.  BUN of 46 creatinine of 7.8, bicarb of 12. COVID test positive Chest x-ray demonstrates cardiomegaly a with mild edema with no evidence of congestive heart failure.  Left leg ultrasound was negative for DVT.  Patient admitted to Hospital Medicine for observation management.             * No surgery found *      Hospital Course:     The patient was admitted with chest pain, shortness of breath and nausea resulting from COVID-19.  Symptoms were managed and patient will be discharged on Zofran for nausea and hydrocodone for pain.  Given lack of data regarding use of remdesivir and end-stage renal patient's, remdesivir was not initiated as patient did not clinically decline in the observation period.    Additionally, on admission D-dimer was noted to be elevated.  Heparin drip was started.  Four extremity Dopplers and V/Q scan were negative and as such patient will not be discharged on  anticoagulation.    Peripheral saturations occasionally were at 94% or below but she did not require supplemental oxygen.  As such she will not be discharged on dexamethasone. She will be discharged on vitamin D and zinc and inhaled budesonide.     Patient received dialysis while in house.  Nephrology has recommended resumption of home regimen at discharge regarding her hyperphosphatemia.     Prior to admission, the patient was receiving merrem nightly for pyelonephritis. This was resumed on 7/1 and will be continued through 7/6 as previously recommended by ID. She will follow with that service on 7/7.        * COVID  As above      Pyelonephritis 2/2 Klebsiella oxytoca (ESBL producing)   -present on admission   -diagnosed during a prior admission   -Continue merrem as above.     Essential hypertension  Chronic  Chronic, controlled.  Latest blood pressure and vitals reviewed-   Temp:  [97.4 °F (36.3 °C)-98.9 °F (37.2 °C)]   Pulse:  [76-92]   Resp:  [18-20]   BP: (132-204)/()   SpO2:  [94 %-100 %] .   Home meds for hypertension were reviewed and noted below.         Hypertension Medications                 diltiaZEM (CARDIZEM CD) 120 MG Cp24 Take 1 capsule (120 mg total) by mouth once daily.     hydrALAZINE (APRESOLINE) 50 MG tablet Take 1 tablet (50 mg total) by mouth every 8 (eight) hours.             Resume home regimen on discharge     ESRD on dialysis MWF  Chronic  Nephrology consulted          Anemia of chronic disease  Chronic  Patient's anemia is currently controlled. Has not received any PRBCs to date.. Etiology likely d/t chronic disease  Current CBC reviewed-         Lab Results   Component Value Date     HGB 10.6 (L) 06/28/2022     HCT 32.4 (L) 06/28/2022      Monitor serial CBC and transfuse if patient becomes hemodynamically unstable, symptomatic or H/H drops below 7/21.         Hypothyroidism  Chronic  Continue Levothyroxine             Consults:   Consults (From admission, onward)        Status  Ordering Provider     Inpatient consult to Midline team  Once        Provider:  (Not yet assigned)    Completed KJ STREETER     Inpatient consult to Nephrology  Once        Provider:  Jorge Webb MD    Completed CATHERINE MOSLEY          Final Active Diagnoses:    Diagnosis Date Noted POA    PRINCIPAL PROBLEM:  COVID [U07.1] 06/28/2022 Yes    ESRD on dialysis MWF [N18.6, Z99.2] 03/15/2021 Not Applicable     Chronic    Essential hypertension [I10] 03/15/2021 Yes     Chronic    Hypothyroidism [E03.9] 02/10/2020 Yes     Chronic    Anemia of chronic disease [D63.8] 02/10/2020 Yes     Chronic      Problems Resolved During this Admission:      Discharged Condition: stable    Disposition: Home or Self Care    Follow Up:   Follow-up Information     Raúl Dietz MD Follow up.    Specialty: Nephrology  Why: Per office,  will see pt at dialysis unit.  Contact information:  [address] [facility] NEPHROLOGY [facility] [address]  [phone]             April Horton MD Follow up.    Specialty: Internal Medicine  Contact information:  [address] [facility] [address]  [phone]                       Patient Instructions:      Ambulatory referral/consult to Home Health   Standing Status: Future   Referral Priority: Routine Referral Type: Home Health   Referral Reason: Specialty Services Required   Requested Specialty: Home Health Services   Number of Visits Requested: 1     Notify your health care provider if you experience any of the following:  temperature >100.4     Notify your health care provider if you experience any of the following:  persistent nausea and vomiting or diarrhea     Notify your health care provider if you experience any of the following:  severe uncontrolled pain     Notify your health care provider if you experience any of the following:  difficulty breathing or increased cough     Notify your health care provider if you experience any of the following:  severe  persistent headache     Notify your health care provider if you experience any of the following:  worsening rash     Notify your health care provider if you experience any of the following:  persistent dizziness, light-headedness, or visual disturbances     Activity as tolerated     Medications:  Reconciled Home Medications:      Medication List      START taking these medications    budesonide 0.5 mg/2 mL nebulizer solution  Commonly known as: PULMICORT  Take 2 mLs (0.5 mg total) by nebulization once daily. Controller for 7 days     cholecalciferol (vitamin D3) 25 mcg (1,000 unit) capsule  Commonly known as: VITAMIN D3  Take 1 capsule (1,000 Units total) by mouth once daily.     HYDROcodone-acetaminophen 5-325 mg per tablet  Commonly known as: NORCO  Take 1 tablet by mouth every 6 (six) hours as needed.     zinc 50 mg Tab  Take 1 tablet by mouth once daily at 6am. for 10 days        CHANGE how you take these medications    levothyroxine 88 MCG tablet  Commonly known as: SYNTHROID  TAKE 1 TABLET BEFORE BREAKFAST  What changed:   · how much to take  · how to take this  · when to take this     meropenem-0.9% sodium chloride 500 mg/50 mL Pgbk  Inject 50 mLs (500 mg total) into the vein every evening.  What changed: when to take this     promethazine 25 MG tablet  Commonly known as: PHENERGAN  TAKE 1 TABLET(25 MG) BY MOUTH TWICE DAILY AS NEEDED FOR NAUSEA  What changed: See the new instructions.        CONTINUE taking these medications    albuterol-ipratropium 2.5 mg-0.5 mg/3 mL nebulizer solution  Commonly known as: DUO-NEB  Take 3 mLs by nebulization every 6 (six) hours as needed for Wheezing or Shortness of Breath. Rescue     AURYXIA 210 mg iron Tab  Generic drug: ferric citrate  Take 2 tablets by mouth 3 (three) times daily with meals.     diltiaZEM 120 MG Cp24  Commonly known as: CARDIZEM CD  Take 1 capsule (120 mg total) by mouth once daily.     gabapentin 100 MG capsule  Commonly known as: NEURONTIN  Take 100 mg  by mouth 2 (two) times daily. HCS     hydrALAZINE 50 MG tablet  Commonly known as: APRESOLINE  Take 1 tablet (50 mg total) by mouth every 8 (eight) hours.     LORazepam 0.5 MG tablet  Commonly known as: ATIVAN  Take 1 tablet (0.5 mg total) by mouth every evening.     rOPINIRole 0.5 MG tablet  Commonly known as: REQUIP  Take 0.5 mg by mouth every 8 (eight) hours.     sertraline 50 MG tablet  Commonly known as: ZOLOFT  Take 1 tablet (50 mg total) by mouth every evening. Take 50 mg by mouth every evening.        STOP taking these medications    miconazole 2 % cream  Commonly known as: MICOTIN        ASK your doctor about these medications    ondansetron 4 MG Tbdl  Commonly known as: ZOFRAN-ODT  Take 1 tablet (4 mg total) by mouth once. for 1 dose  Ask about: Should I take this medication?                Pending Diagnostic Studies:     Procedure Component Value Units Date/Time    CBC with Automated Differential [[id number]] Collected: 07/01/22 0312    Order Status: Sent Lab Status: In process Updated: 07/01/22 0312    Specimen: Blood         Indwelling Lines/Drains at time of discharge:   Lines/Drains/Airways     Drain  Duration                Hemodialysis AV Fistula Left upper arm -- days                Time spent on the discharge of patient: 35 minutes         Christiane Brice MD  Department of Hospital Medicine  [facility]

## 2022-06-30 NOTE — PLAN OF CARE
Pt cleared for discharge from Case Management    SW sent message to [facility] schedulers requesting a PCP or VANDANA dc f/u appt in 1 week.  Called Dr. Dietz, Neph office, [phone], spoke to Tressa, no appt needed,  will see/follow up w/  pt at dialysis unit.     06/30/22 0983   Final Note   Assessment Type Final Discharge Note   Anticipated Discharge Disposition Home   What phone number can be called within the next 1-3 days to see how you are doing after discharge?   ([phone])

## 2022-06-30 NOTE — PT/OT/SLP EVAL
"Physical Therapy Evaluation and Discharge Note    Patient Name:  Shara Hutchins   MRN:  [id number]    Recommendations:     Discharge Recommendations:  home   Discharge Equipment Recommendations: none   Barriers to discharge: None    Assessment:     Shara Hutchins is a 72 y.o. female admitted with a medical diagnosis of COVID. .  At this time, patient is functioning at their prior level of function and does not require further acute PT services.     Pt is alert/interactive. Presents with functional bed mobility- ambulated to bathroom and around room independently to supervision. Pt on RA.  No acute PT needs    Recent Surgery: * No surgery found *      Plan:     During this hospitalization, patient does not require further acute PT services.  Please re-consult if situation changes.      Subjective   Stated had a hard time with dialysis access yesterday  "I've been stuck so many times"  Stated that she has been living at the [facility] x 1 year and loved it there  Stated is independent in her own apartment and able to cook- "not driving anymore"    Chief Complaint: " I feel all banged up"  Patient/Family Comments/goals: get back home to [place]  Pain/Comfort:  · Pain Rating 1: 0/10    Patients cultural, spiritual, Sikhism conflicts given the current situation:      Living Environment:  [facility]  Prior to admission, patients level of function was independent.  Equipment used at home: none.  DME owned (not currently used): none.  Upon discharge, patient will have assistance from assisted living.    Objective:     Communicated with charge nurse Jo-Ann prior to session.  Patient found HOB elevated with telemetry upon PT entry to room.    General Precautions: Standard, airborne, contact, droplet, fall   Orthopedic Precautions:N/A   Braces: N/A   Respiratory Status: Room air    Exams:  · Postural Exam:  Patient presented with the following abnormalities:    · -       Rounded shoulders  · -       " Forward head  · -       BMI 25  · RLE ROM: WFL  · RLE Strength: WFL  · LLE ROM: WFL  · LLE Strength: WFL    Functional Mobility:  · Bed Mobility:     · Rolling Left:  independence  · Scooting: independence  · Supine to Sit: independence  · Sit to Supine: independence  · Transfers:     · Sit to Stand:  independence with no AD  · Toilet Transfer: independence with  no AD  using  Stand Pivot  · Gait: 50ft around room independently and on RA    AM-PAC 6 CLICK MOBILITY  Total Score:21       Therapeutic Activities and Exercises:   Patient was educated on the importance of OOB activity and functional mobility to negate negative effects of prolonged bed rest during hospitalization, safe transfers and ambulation, and D/C planning   Pt encouraged activities    AM-PAC 6 CLICK MOBILITY  Total Score:21     Patient left HOB elevated with all lines intact and call button in reach.    GOALS:   Multidisciplinary Problems     Physical Therapy Goals     Not on file                History:     Past Medical History:   Diagnosis Date    Cellulitis of trunk     Perineum, numerous episodes    Chronic diarrhea 02/10/2020    Short-gut syndrome    Diabetes mellitus, type 2     pt has not had for years after losing weight    End stage renal disease on dialysis 01/07/2018    Fatigue     Hypertension     Hypothyroidism 2/10/2020    Iron deficiency anemia due to chronic blood loss 1/7/2018    Pulmonary hypertension 3/20/2020    Vulvar cancer 1/7/2019       Past Surgical History:   Procedure Laterality Date    ABDOMINAL SURGERY      ANGIOGRAPHY OF ARTERIOVENOUS SHUNT N/A 1/19/2022    Procedure: Fistulogram with Possible Intervention;  Surgeon: Rza Maher MD;  Location: [facility] CATH/EP LAB;  Service: Cardiothoracic;  Laterality: N/A;    APPENDECTOMY      AV FISTULA PLACEMENT Left 2018    BACK SURGERY      BLADDER FULGURATION N/A 6/16/2020    Procedure: FULGURATION, BLADDER;  Surgeon: Damari Barber Jr., MD;  Location: [facility] OR;   Service: Urology;  Laterality: N/A;    carpel tunnel surgery once on each hand      CERVICAL FUSION      x 4    cervix repair      CHOLECYSTECTOMY  2000    COLECTOMY      CYSTOSCOPY W/ RETROGRADES  6/16/2020    Procedure: CYSTOSCOPY, WITH RETROGRADE PYELOGRAM;  Surgeon: Damari Barebr Jr., MD;  Location: [facility] OR;  Service: Urology;;    CYSTOSCOPY W/ RETROGRADES Right 4/20/2021    Procedure: CYSTOSCOPY, WITH RETROGRADE PYELOGRAM;  Surgeon: Damari Barber Jr., MD;  Location: [facility] OR;  Service: Urology;  Laterality: Right;    CYSTOSCOPY W/ URETERAL STENT PLACEMENT Right 3/16/2021    Procedure: CYSTOSCOPY, WITH URETERAL STENT INSERTION;  Surgeon: Damari Barber Jr., MD;  Location: [facility] OR;  Service: Urology;  Laterality: Right;    CYSTOSCOPY W/ URETERAL STENT PLACEMENT Right 5/18/2021    Procedure: CYSTOSCOPY, WITH URETERAL STENT INSERTION;  Surgeon: Damari Barber Jr., MD;  Location: [facility] OR;  Service: Urology;  Laterality: Right;    CYSTOSCOPY W/ URETERAL STENT PLACEMENT Right 6/22/2022    Procedure: CYSTOSCOPY, WITH URETERAL STENT INSERTION;  Surgeon: Gaby Garcia MD;  Location: [facility] OR;  Service: Urology;  Laterality: Right;  cysto stent exchange    CYSTOSCOPY W/ URETERAL STENT REMOVAL Right 4/20/2021    Procedure: CYSTOSCOPY, WITH URETERAL STENT REMOVAL;  Surgeon: Damari Barber Jr., MD;  Location: [facility] OR;  Service: Urology;  Laterality: Right;    CYSTOSCOPY WITH URETEROSCOPY, RETROGRADE PYELOGRAPHY, AND INSERTION OF STENT Right 3/30/2021    Procedure: CYSTOSCOPY, WITH RETROGRADE PYELOGRAM AND URETERAL STENT INSERTION;  Surgeon: Damari Barber Jr., MD;  Location: [facility] OR;  Service: Urology;  Laterality: Right;    CYSTOURETEROSCOPY WITH RETROGRADE PYELOGRAPHY AND INSERTION OF STENT INTO URETER Right 1/18/2022    Procedure: CYSTOURETEROSCOPY, WITH RETROGRADE PYELOGRAM AND URETERAL STENT INSERTION;  Surgeon: Damari Barber Jr., MD;  Location: [facility] OR;  Service: Urology;  Laterality: Right;     DIAGNOSTIC LAPAROSCOPY N/A 2/13/2020    Procedure: LAPAROSCOPY, DIAGNOSTIC;  Surgeon: Robbi Lovell III, MD;  Location: [facility] OR;  Service: General;  Laterality: N/A;    HYSTERECTOMY  1980    ILEOSTOMY N/A 2/13/2020    Procedure: CREATION, ILEOSTOMY Loop;  Surgeon: Robbi Lovell III, MD;  Location: [facility] OR;  Service: General;  Laterality: N/A;    ILEOSTOMY CLOSURE N/A 5/18/2020    Procedure: CLOSURE, ILEOSTOMY;  Surgeon: Robbi Lovell III, MD;  Location: [facility] OR;  Service: General;  Laterality: N/A;    LASER LITHOTRIPSY Right 3/30/2021    Procedure: LITHOTRIPSY, USING LASER;  Surgeon: Damari Barber Jr., MD;  Location: [facility] OR;  Service: Urology;  Laterality: Right;    LUMBAR LAMINECTOMY      LYSIS OF ADHESIONS N/A 2/13/2020    Procedure: LYSIS, ADHESIONS;  Surgeon: Robbi Lovell III, MD;  Location: [facility] OR;  Service: General;  Laterality: N/A;    NECK SURGERY      PERCUTANEOUS TRANSLUMINAL ANGIOPLASTY OF ARTERIOVENOUS FISTULA N/A 1/19/2022    Procedure: PTA, AV FISTULA;  Surgeon: Raz Maher MD;  Location: [facility] CATH/EP LAB;  Service: Cardiothoracic;  Laterality: N/A;    PHLEBOGRAPHY  2/28/2020    Procedure: VENOGRAM;  Surgeon: Robbi Lovell III, MD;  Location: [facility] OR;  Service: General;;    REMOVAL OF VASCULAR ACCESS PORT Right 2/13/2020    Procedure: REMOVAL, VASCULAR ACCESS PORT;  Surgeon: Robbi Lovell III, MD;  Location: [facility] OR;  Service: General;  Laterality: Right;    RETROGRADE PYELOGRAPHY  3/16/2021    Procedure: PYELOGRAM, RETROGRADE;  Surgeon: Damari Barber Jr., MD;  Location: [facility] OR;  Service: Urology;;    RETROGRADE PYELOGRAPHY Right 5/18/2021    Procedure: PYELOGRAM, RETROGRADE;  Surgeon: Damari Barber Jr., MD;  Location: [facility] OR;  Service: Urology;  Laterality: Right;    RETROGRADE PYELOGRAPHY  6/22/2022    Procedure: PYELOGRAM, RETROGRADE;  Surgeon: Gaby Garcia MD;  Location: [facility] OR;  Service: Urology;;    SHOULDER SURGERY       URETEROSCOPIC REMOVAL OF URETERIC CALCULUS Right 3/30/2021    Procedure: REMOVAL, CALCULUS, URETER, URETEROSCOPIC;  Surgeon: Damari Barber Jr., MD;  Location: [facility] OR;  Service: Urology;  Laterality: Right;    URETEROSCOPIC REMOVAL OF URETERIC CALCULUS Right 4/20/2021    Procedure: REMOVAL, CALCULUS, URETER, URETEROSCOPIC;  Surgeon: Damari Barber Jr., MD;  Location: [facility] OR;  Service: Urology;  Laterality: Right;    vulva cancer         Time Tracking:     PT Received On: 06/30/22  PT Start Time: 1401     PT Stop Time: 1413  PT Total Time (min): 12 min     Billable Minutes: Evaluation 12      06/30/2022

## 2022-06-30 NOTE — PT/OT/SLP PROGRESS
Occupational Therapy      Patient Name:  Shara Hutchins   MRN:  [id number]    Patient not seen today secondary to Other (Comment) (Attempted OT Eval. Pt declined stating OT services are not necessary Nurse John advised.).    6/30/2022

## 2022-06-30 NOTE — CARE UPDATE
06/29/22 1928   Patient Assessment/Suction   Level of Consciousness (AVPU) alert   Respiratory Effort Normal;Unlabored   Expansion/Accessory Muscles/Retractions expansion symmetric;no retractions;no use of accessory muscles   All Lung Fields Breath Sounds coarse   Cough Frequency frequent   Cough Type nonproductive;loose   PRE-TX-O2   O2 Device (Oxygen Therapy) room air   SpO2 96 %   Pulse Oximetry Type Intermittent   Pulse 91   Resp 18   Inhaler   $ Inhaler Charges MDI (Metered Dose Inahler) Treatment   Respiratory Treatment Status (Inhaler) given   Treatment Route (Inhaler) spacer/holding chamber   Patient Position (Inhaler) HOB elevated   Signs of Intolerance (Inhaler) none   Breath Sounds Post-Respiratory Treatment   Throughout All Fields Post-Treatment All Fields   Throughout All Fields Post-Treatment no change

## 2022-07-01 NOTE — PLAN OF CARE
POC reviewed with patient. PT verbalized understandins. PT on room air. Pain controlled with PRNs. Coughing has reduced. Cleared medically to discharge. Pt educated concerning call light, incentive spirometer and all meds given. Isolation precautions maintained. PT remains free of falls.Bed is low and locked rails up x2.

## 2022-07-01 NOTE — PROGRESS NOTES
At Dr. Brice's request,  called [phone], [facility] pharmacy and cancelled Dexamethasone, spoke to [facility].    Home Health cancelled, no longer needed, per Dr. Brice.     07/01/22 2632   Post-Acute Status   Post-Acute Authorization Home Health   Home Health Status Discharge Plan Changed

## 2022-07-01 NOTE — PLAN OF CARE
Plan of care reviewed with patient, patient states understanding. Patient A&Ox4. Patients midline clean, dry and intact. Patient on room air. C/o pain controlled by PRN medication. COVID precautions maintained. Bed alarm on. Bed in low, locked position with call button within reach.

## 2022-07-01 NOTE — PROGRESS NOTES
INPATIENT NEPHROLOGY CONSULT   [facility] NEPHROLOGY    Shara Hutchins  07/01/2022    Reason for consultation:    ESRD    Chief Complaint:   Chief Complaint   Patient presents with    Cough     X 2 days - sent from [facility] for evaluation     Nasal Congestion          History of Present Illness:    Per H and P   Shara Hutchins is a 72-year-old female who presents emergency room complaining of cough, fever, chills, nausea, vomiting, shortness of breath.  The symptoms onset approximately 48 hours ago and progressively worsened.  Of note patient was recently admitted to [facility] where she was admitted for obstructive uropathy requiring ureteral stenting.  She is vaccinated for COVID.  She had a booster for COVID.  Previous medical history includes respiratory failure, anemia, DARCI, COPD, end-stage renal disease on dialysis (Monday Wednesday Friday), hypertension, vulvar cancer, and hypothyroidism.  ER workup:  CBC with baseline anemia.  CMP with potassium of 3.2 which was repleted by ER.  BUN of 46 creatinine of 7.8, bicarb of 12. COVID test positive Chest x-ray demonstrates cardiomegaly a with mild edema with no evidence of congestive heart failure.  Left leg ultrasound was negative for DVT.  Patient admitted to Hospital Medicine for observation management    6/30  AFVSS.  AVF was finally able to be used yesterday. She feels very weak today  7/1  Seen on dialysis.  Feeling better    Plan of Care:       Assessment:    esrd  --cancel surgery consult for trialysis catheter  --continue dialysis per routine once access obtained  --fluid restrict  --renal dose medication per routine  --continue outpt medication  --continue binders with meals    Anemia  --josh with hd  --had a lot of bleeding from Midline.   Hg 9.5 today    Covid pneumonia  --per Hospital medicine    Hypokalemia  --resolved.  Mg ok     Hyperphosphatemia  --I told the patient to take 3 Auryxia binders with her largest meal and 2 with the other meals for the next 4  weeks    Stable for d/c from renal standpoint           Thank you for allowing us to participate in this patient's care. We will continue to follow.    Vital Signs:  Temp Readings from Last 3 Encounters:   07/01/22 97.9 °F (36.6 °C) (Oral)   06/25/22 98.4 °F (36.9 °C) (Oral)   06/18/22 97.5 °F (36.4 °C) (Oral)       Pulse Readings from Last 3 Encounters:   07/01/22 68   06/25/22 75   06/18/22 96       BP Readings from Last 3 Encounters:   07/01/22 108/64   06/25/22 131/77   06/18/22 (!) 145/77       Weight:  Wt Readings from Last 3 Encounters:   06/28/22 74.7 kg (164 lb 10.9 oz)   06/25/22 66.7 kg (147 lb 0.8 oz)   06/18/22 65.5 kg (144 lb 8.2 oz)       Past Medical & Surgical History:  Past Medical History:   Diagnosis Date    Cellulitis of trunk     Perineum, numerous episodes    Chronic diarrhea 02/10/2020    Short-gut syndrome    Diabetes mellitus, type 2     pt has not had for years after losing weight    End stage renal disease on dialysis 01/07/2018    Fatigue     Hypertension     Hypothyroidism 2/10/2020    Iron deficiency anemia due to chronic blood loss 1/7/2018    Pulmonary hypertension 3/20/2020    Vulvar cancer 1/7/2019       Past Surgical History:   Procedure Laterality Date    ABDOMINAL SURGERY      ANGIOGRAPHY OF ARTERIOVENOUS SHUNT N/A 1/19/2022    Procedure: Fistulogram with Possible Intervention;  Surgeon: Raz Maher MD;  Location: [facility] CATH/EP LAB;  Service: Cardiothoracic;  Laterality: N/A;    APPENDECTOMY      AV FISTULA PLACEMENT Left 2018    BACK SURGERY      BLADDER FULGURATION N/A 6/16/2020    Procedure: FULGURATION, BLADDER;  Surgeon: Damari Barber Jr., MD;  Location: [facility] OR;  Service: Urology;  Laterality: N/A;    carpel tunnel surgery once on each hand      CERVICAL FUSION      x 4    cervix repair      CHOLECYSTECTOMY  2000    COLECTOMY      CYSTOSCOPY W/ RETROGRADES  6/16/2020    Procedure: CYSTOSCOPY, WITH RETROGRADE PYELOGRAM;  Surgeon: Damari Barber  MD Nuria;  Location: [facility] OR;  Service: Urology;;    CYSTOSCOPY W/ RETROGRADES Right 4/20/2021    Procedure: CYSTOSCOPY, WITH RETROGRADE PYELOGRAM;  Surgeon: Damari Barber Jr., MD;  Location: [facility] OR;  Service: Urology;  Laterality: Right;    CYSTOSCOPY W/ URETERAL STENT PLACEMENT Right 3/16/2021    Procedure: CYSTOSCOPY, WITH URETERAL STENT INSERTION;  Surgeon: Damari Barber Jr., MD;  Location: [facility] OR;  Service: Urology;  Laterality: Right;    CYSTOSCOPY W/ URETERAL STENT PLACEMENT Right 5/18/2021    Procedure: CYSTOSCOPY, WITH URETERAL STENT INSERTION;  Surgeon: Damari Barber Jr., MD;  Location: [facility] OR;  Service: Urology;  Laterality: Right;    CYSTOSCOPY W/ URETERAL STENT PLACEMENT Right 6/22/2022    Procedure: CYSTOSCOPY, WITH URETERAL STENT INSERTION;  Surgeon: Gaby Garcia MD;  Location: [facility] OR;  Service: Urology;  Laterality: Right;  cysto stent exchange    CYSTOSCOPY W/ URETERAL STENT REMOVAL Right 4/20/2021    Procedure: CYSTOSCOPY, WITH URETERAL STENT REMOVAL;  Surgeon: Damari Barber Jr., MD;  Location: [facility] OR;  Service: Urology;  Laterality: Right;    CYSTOSCOPY WITH URETEROSCOPY, RETROGRADE PYELOGRAPHY, AND INSERTION OF STENT Right 3/30/2021    Procedure: CYSTOSCOPY, WITH RETROGRADE PYELOGRAM AND URETERAL STENT INSERTION;  Surgeon: Damari Barber Jr., MD;  Location: [facility];  Service: Urology;  Laterality: Right;    CYSTOURETEROSCOPY WITH RETROGRADE PYELOGRAPHY AND INSERTION OF STENT INTO URETER Right 1/18/2022    Procedure: CYSTOURETEROSCOPY, WITH RETROGRADE PYELOGRAM AND URETERAL STENT INSERTION;  Surgeon: Damari Barber Jr., MD;  Location: [facility];  Service: Urology;  Laterality: Right;    DIAGNOSTIC LAPAROSCOPY N/A 2/13/2020    Procedure: LAPAROSCOPY, DIAGNOSTIC;  Surgeon: Robbi Lovell III, MD;  Location: [facility];  Service: General;  Laterality: N/A;    HYSTERECTOMY  1980    ILEOSTOMY N/A 2/13/2020    Procedure: CREATION, ILEOSTOMY Loop;  Surgeon: Robbi Lovell  MD GLENNA;  Location: [facility] OR;  Service: General;  Laterality: N/A;    ILEOSTOMY CLOSURE N/A 5/18/2020    Procedure: CLOSURE, ILEOSTOMY;  Surgeon: Robbi Lovell III, MD;  Location: [facility] OR;  Service: General;  Laterality: N/A;    LASER LITHOTRIPSY Right 3/30/2021    Procedure: LITHOTRIPSY, USING LASER;  Surgeon: Damari Barber Jr., MD;  Location: [facility] OR;  Service: Urology;  Laterality: Right;    LUMBAR LAMINECTOMY      LYSIS OF ADHESIONS N/A 2/13/2020    Procedure: LYSIS, ADHESIONS;  Surgeon: Robbi Lovell III, MD;  Location: [facility] OR;  Service: General;  Laterality: N/A;    NECK SURGERY      PERCUTANEOUS TRANSLUMINAL ANGIOPLASTY OF ARTERIOVENOUS FISTULA N/A 1/19/2022    Procedure: PTA, AV FISTULA;  Surgeon: Raz Maher MD;  Location: [facility] CATH/EP LAB;  Service: Cardiothoracic;  Laterality: N/A;    PHLEBOGRAPHY  2/28/2020    Procedure: VENOGRAM;  Surgeon: Robbi Lovell III, MD;  Location: [facility] OR;  Service: General;;    REMOVAL OF VASCULAR ACCESS PORT Right 2/13/2020    Procedure: REMOVAL, VASCULAR ACCESS PORT;  Surgeon: Robbi Lovell III, MD;  Location: [facility] OR;  Service: General;  Laterality: Right;    RETROGRADE PYELOGRAPHY  3/16/2021    Procedure: PYELOGRAM, RETROGRADE;  Surgeon: Damari Barber Jr., MD;  Location: [facility] OR;  Service: Urology;;    RETROGRADE PYELOGRAPHY Right 5/18/2021    Procedure: PYELOGRAM, RETROGRADE;  Surgeon: Damari Barber Jr., MD;  Location: [facility] OR;  Service: Urology;  Laterality: Right;    RETROGRADE PYELOGRAPHY  6/22/2022    Procedure: PYELOGRAM, RETROGRADE;  Surgeon: Gaby Garcia MD;  Location: [facility] OR;  Service: Urology;;    SHOULDER SURGERY      URETEROSCOPIC REMOVAL OF URETERIC CALCULUS Right 3/30/2021    Procedure: REMOVAL, CALCULUS, URETER, URETEROSCOPIC;  Surgeon: Damari Barber Jr., MD;  Location: [facility] OR;  Service: Urology;  Laterality: Right;    URETEROSCOPIC REMOVAL OF URETERIC CALCULUS Right 4/20/2021    Procedure: REMOVAL,  CALCULUS, URETER, URETEROSCOPIC;  Surgeon: Damari Barber Jr., MD;  Location: [facility];  Service: Urology;  Laterality: Right;    vulva cancer         Past Social History:  Social History     Socioeconomic History    Marital status:    Tobacco Use    Smoking status: Former Smoker     Packs/day: 1.00     Years: 33.00     Pack years: 33.00     Types: Cigarettes     Quit date: 2000     Years since quittin.5    Smokeless tobacco: Former User     Quit date:    Substance and Sexual Activity    Alcohol use: No    Drug use: No       Medications:  Current Facility-Administered Medications on File Prior to Encounter   Medication Dose Route Frequency Provider Last Rate Last Admin    electrolyte-S (ISOLYTE)   Intravenous Continuous Raz Ruff MD        lactated ringers infusion   Intravenous Continuous Ruy Beltre MD        LIDOcaine (PF) 10 mg/ml (1%) injection 5 mg  0.5 mL Intradermal Once Raz Ruff MD         Current Outpatient Medications on File Prior to Encounter   Medication Sig Dispense Refill    albuterol-ipratropium (DUO-NEB) 2.5 mg-0.5 mg/3 mL nebulizer solution Take 3 mLs by nebulization every 6 (six) hours as needed for Wheezing or Shortness of Breath. Rescue 75 mL 0    diltiaZEM (CARDIZEM CD) 120 MG Cp24 Take 1 capsule (120 mg total) by mouth once daily. 90 capsule 3    ferric citrate (AURYXIA) 210 mg iron Tab Take 2 tablets by mouth 3 (three) times daily with meals.      gabapentin (NEURONTIN) 100 MG capsule Take 100 mg by mouth 2 (two) times daily. HCS      hydrALAZINE (APRESOLINE) 50 MG tablet Take 1 tablet (50 mg total) by mouth every 8 (eight) hours. 270 tablet 0    levothyroxine (SYNTHROID) 88 MCG tablet TAKE 1 TABLET BEFORE BREAKFAST (Patient taking differently: Take 88 mcg by mouth before breakfast. TAKE 1 TABLET BEFORE BREAKFAST) 90 tablet 3    LORazepam (ATIVAN) 0.5 MG tablet Take 1 tablet (0.5 mg total) by mouth every evening. 90 tablet 1     "promethazine (PHENERGAN) 25 MG tablet TAKE 1 TABLET(25 MG) BY MOUTH TWICE DAILY AS NEEDED FOR NAUSEA (Patient taking differently: Take 25 mg by mouth 2 (two) times daily as needed for Nausea.) 60 tablet 0    rOPINIRole (REQUIP) 0.5 MG tablet Take 0.5 mg by mouth every 8 (eight) hours.      sertraline (ZOLOFT) 50 MG tablet Take 1 tablet (50 mg total) by mouth every evening. Take 50 mg by mouth every evening. 90 tablet 1    [DISCONTINUED] miconazole (MICOTIN) 2 % cream Apply topically 2 (two) times daily. 28 g 0     Scheduled Meds:   albuterol-ipratropium  3 mL Nebulization Q4H WAKE    ascorbic acid (vitamin C)  500 mg Oral BID    dexAMETHasone  6 mg Oral Daily    diltiaZEM  120 mg Oral Daily    epoetin nica-epbx  100 Units/kg Intravenous Every Mon, Wed, Fri    gabapentin  100 mg Oral BID    hydrALAZINE  50 mg Oral Q8H    levothyroxine  88 mcg Oral Before breakfast    LORazepam  0.5 mg Oral QHS    meropenem (MERREM) IVPB  500 mg Intravenous QHS    multivitamin  1 tablet Oral Daily    mupirocin   Nasal BID    rOPINIRole  0.5 mg Oral Q8H    sertraline  50 mg Oral QHS    sevelamer carbonate  800 mg Oral TID WM     Continuous Infusions:  PRN Meds:.acetaminophen, benzonatate, dextrose 10%, dextrose 10%, glucagon (human recombinant), glucose, glucose, guaiFENesin 100 mg/5 ml, HYDROcodone-acetaminophen, insulin aspart U-100, ondansetron, sodium chloride 0.9%    Allergies:  Ciprofloxacin and Pcn [penicillins]    Past Family History:  Reviewed; refer to Hospitalist Admission Note    Review of Systems:  Review of Systems - All 14 systems reviewed and negative, except as noted in HPI    Physical Exam:    /64 (BP Location: Left leg, Patient Position: Lying)   Pulse 68   Temp 97.9 °F (36.6 °C) (Oral)   Resp 18   Ht 5' 7.5" (1.715 m)   Wt 74.7 kg (164 lb 10.9 oz)   SpO2 95%   BMI 25.41 kg/m²     General Appearance:    Alert, cooperative, no distress, appears stated age   Head:    Normocephalic, " without obvious abnormality, atraumatic   Eyes:    PER, conjunctiva/corneas clear, EOM's intact in both eyes        Throat:   Lips, mucosa, and tongue normal; teeth and gums normal   Back:     Symmetric, no curvature, ROM normal, no CVA tenderness   Lungs:     Clear to auscultation bilaterally, respirations unlabored   Chest wall:    No tenderness or deformity   Heart:    Regular rate and rhythm, S1 and S2 normal, no murmur, rub   or gallop   Abdomen:     Soft, non-tender, bowel sounds active all four quadrants,     no masses, no organomegaly   Extremities:   Extremities normal, atraumatic, no cyanosis or edema   Pulses:   2+ and symmetric all extremities   MSK:   No joint or muscle swelling, tenderness or deformity   Skin:   Skin color, texture, turgor normal, no rashes or lesions   Neurologic:   CNII-XII intact, normal strength and sensation       Throughout.  No flap     Results:  Lab Results   Component Value Date     07/01/2022    K 3.5 07/01/2022     07/01/2022    CO2 18 (L) 07/01/2022    BUN 43 (H) 07/01/2022    CREATININE 7.0 (H) 07/01/2022    CALCIUM 8.2 (L) 07/01/2022    ANIONGAP 16 07/01/2022    ESTGFRAFRICA 6 (A) 07/01/2022    EGFRNONAA 5 (A) 07/01/2022       Lab Results   Component Value Date    CALCIUM 8.2 (L) 07/01/2022    PHOS 7.1 (H) 07/01/2022       No results for input(s): WBC, RBC, HGB, HCT, PLT, MCV, MCH, MCHC in the last 24 hours.       I have personally reviewed pertinent radiological imaging and reports.

## 2022-07-01 NOTE — CARE UPDATE
07/01/22 0710   Patient Assessment/Suction   Level of Consciousness (AVPU) alert   Respiratory Effort Unlabored   Expansion/Accessory Muscles/Retractions no use of accessory muscles   All Lung Fields Breath Sounds coarse   Rhythm/Pattern, Respiratory no shortness of breath reported   Cough Frequency frequent   Cough Type loose;nonproductive   PRE-TX-O2   O2 Device (Oxygen Therapy) room air   SpO2 97 %   Pulse Oximetry Type Intermittent   $ Pulse Oximetry - Multiple Charge Pulse Oximetry - Multiple   Pulse 75   Resp 18   Positioning   Body Position position changed independently   Head of Bed (HOB) Positioning HOB at 20-30 degrees   Aerosol Therapy   $ Aerosol Therapy Charges Aerosol Treatment   Respiratory Treatment Status (SVN) given   Treatment Route (SVN) mask;oxygen   Patient Position (SVN) semi-Ag's   Post Treatment Assessment (SVN) breath sounds unchanged   Signs of Intolerance (SVN) none   Breath Sounds Post-Respiratory Treatment   Throughout All Fields Post-Treatment All Fields   Throughout All Fields Post-Treatment no change   Post-treatment Heart Rate (beats/min) 78   Post-treatment Resp Rate (breaths/min) 19

## 2022-07-03 NOTE — PLAN OF CARE
07/03/22 1004   Final Note   Assessment Type Final Discharge Note   Anticipated Discharge Disposition Home   Hospital Resources/Appts/Education Provided Appointments scheduled and added to AVS;Provided patient/caregiver with written discharge plan information;Provided education on problems/symptoms using teachback

## 2022-07-07 NOTE — PROGRESS NOTES
"Subjective:       Patient ID: Shara Hutchins is a 72 y.o. female.    Chief Complaint:: hospital follow up visit     HPI   Seen at [facility]:   "Shara Hutchins is a 72 y.o. female known to me from multiple prior hospitalizations, presented to the ED yesterday with acute onset, severe, right flank pain c/w passing a kidney stone. In the ED she had a CTRSS which demonstrated worsening hydronephrosis compared to 4/28 and air in the renal pelvis. UA was abnormal with >100 WBC obtained by catheterization. She had no fever, but did have leukocytosis. she had nausea and vomiting from the pain. 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  She was recently discharged, 6/19 on steroids for right sided chest/pleuritic pain. Urine culture is growing a presumptive Ecoli.      6/23: interim reviewed. Urology assistance appreciated. Urine culture with Klebsiella ESBL. She was treated for this from voided urine culture 4/23x a week or more. Has sore throat from being intubated. Eating ok. Right chest sounds congested today. Obtained CXR with no new infiltrates. She is ambulatory. Discussed plan for home IV antibiotics.   6/24:  Afebrile, has tremendous nausea today.  While she has a history of chronic diarrhea, this occurred 2 admissions ago when she was quite full of stool.  I obtained a KUB and indeed her colon is very full.  She still has some loose rhonchi on the right but her chest x-ray was clear yesterday.  She will need a cathartic today  6/25:  Afebrile. She did achieve some success with cathartic. She does have miralax at home and agreed to take it for a few days. She has been instructed in home IV administration and is ok for discharge"    7/7/22: discharged on merrem 500 mg IV daily for ESBL pyelonephritis on the right. Released on 6/25. Now she has no recollection of many of these events.  Admitted to [facility] on 6/28 for weakness, nausea, and was COVID positive, " no pneumonia, borderline oxygen but required no supplementation. Received 3 d of decadron. I accidentally saw that she was admitted and contacted hospitalist at [facility] to continue her meropenem. New midline 6/29.  D/c on vitamin D, zinc and budesonide but she is only doing daily.   She continues to cough, white frothy sputum. No blood or purulent sputum. Sat96% today. Has a low grade temp, 99 today. She had dialysis yesterday and her BP was fine. She had a fall today with abrasions of forearms.       Review of patient's allergies indicates:   Allergen Reactions    Ciprofloxacin Other (See Comments)     Elevated liver enzymes      Pcn [penicillins] Anaphylaxis     TOLERATES ROCEPHIN WITHOUT DIFFICULTY     Past Medical History:   Diagnosis Date    Cellulitis of trunk     Perineum, numerous episodes    Chronic diarrhea 02/10/2020    Short-gut syndrome    Diabetes mellitus, type 2     pt has not had for years after losing weight    End stage renal disease on dialysis 01/07/2018    Fatigue     Hypertension     Hypothyroidism 2/10/2020    Iron deficiency anemia due to chronic blood loss 1/7/2018    Pulmonary hypertension 3/20/2020    Vulvar cancer 1/7/2019     Past Surgical History:   Procedure Laterality Date    ABDOMINAL SURGERY      ANGIOGRAPHY OF ARTERIOVENOUS SHUNT N/A 1/19/2022    Procedure: Fistulogram with Possible Intervention;  Surgeon: Raz Maher MD;  Location: [facility] CATH/EP LAB;  Service: Cardiothoracic;  Laterality: N/A;    APPENDECTOMY      AV FISTULA PLACEMENT Left 2018    BACK SURGERY      BLADDER FULGURATION N/A 6/16/2020    Procedure: FULGURATION, BLADDER;  Surgeon: Damari Barber Jr., MD;  Location: [facility] OR;  Service: Urology;  Laterality: N/A;    carpel tunnel surgery once on each hand      CERVICAL FUSION      x 4    cervix repair      CHOLECYSTECTOMY  2000    COLECTOMY      CYSTOSCOPY W/ RETROGRADES  6/16/2020    Procedure: CYSTOSCOPY, WITH RETROGRADE PYELOGRAM;   Surgeon: Damari Barber Jr., MD;  Location: [facility] OR;  Service: Urology;;    CYSTOSCOPY W/ RETROGRADES Right 4/20/2021    Procedure: CYSTOSCOPY, WITH RETROGRADE PYELOGRAM;  Surgeon: Damari Barber Jr., MD;  Location: [facility] OR;  Service: Urology;  Laterality: Right;    CYSTOSCOPY W/ URETERAL STENT PLACEMENT Right 3/16/2021    Procedure: CYSTOSCOPY, WITH URETERAL STENT INSERTION;  Surgeon: Damari Barber Jr., MD;  Location: [facility] OR;  Service: Urology;  Laterality: Right;    CYSTOSCOPY W/ URETERAL STENT PLACEMENT Right 5/18/2021    Procedure: CYSTOSCOPY, WITH URETERAL STENT INSERTION;  Surgeon: Damari Barber Jr., MD;  Location: [facility] OR;  Service: Urology;  Laterality: Right;    CYSTOSCOPY W/ URETERAL STENT PLACEMENT Right 6/22/2022    Procedure: CYSTOSCOPY, WITH URETERAL STENT INSERTION;  Surgeon: Gaby Garcia MD;  Location: [facility] OR;  Service: Urology;  Laterality: Right;  cysto stent exchange    CYSTOSCOPY W/ URETERAL STENT REMOVAL Right 4/20/2021    Procedure: CYSTOSCOPY, WITH URETERAL STENT REMOVAL;  Surgeon: Damair Barber Jr., MD;  Location: [facility] OR;  Service: Urology;  Laterality: Right;    CYSTOSCOPY WITH URETEROSCOPY, RETROGRADE PYELOGRAPHY, AND INSERTION OF STENT Right 3/30/2021    Procedure: CYSTOSCOPY, WITH RETROGRADE PYELOGRAM AND URETERAL STENT INSERTION;  Surgeon: Damari Barber Jr., MD;  Location: [facility] OR;  Service: Urology;  Laterality: Right;    CYSTOURETEROSCOPY WITH RETROGRADE PYELOGRAPHY AND INSERTION OF STENT INTO URETER Right 1/18/2022    Procedure: CYSTOURETEROSCOPY, WITH RETROGRADE PYELOGRAM AND URETERAL STENT INSERTION;  Surgeon: Damari Barber Jr., MD;  Location: [facility] OR;  Service: Urology;  Laterality: Right;    DIAGNOSTIC LAPAROSCOPY N/A 2/13/2020    Procedure: LAPAROSCOPY, DIAGNOSTIC;  Surgeon: Robbi Lovell III, MD;  Location: [facility] OR;  Service: General;  Laterality: N/A;    HYSTERECTOMY  1980    ILEOSTOMY N/A 2/13/2020    Procedure: CREATION, ILEOSTOMY Loop;   Surgeon: Robbi Lovell III, MD;  Location: [facility] OR;  Service: General;  Laterality: N/A;    ILEOSTOMY CLOSURE N/A 5/18/2020    Procedure: CLOSURE, ILEOSTOMY;  Surgeon: Robbi Lovell III, MD;  Location: [facility] OR;  Service: General;  Laterality: N/A;    LASER LITHOTRIPSY Right 3/30/2021    Procedure: LITHOTRIPSY, USING LASER;  Surgeon: Damari Barber Jr., MD;  Location: [facility] OR;  Service: Urology;  Laterality: Right;    LUMBAR LAMINECTOMY      LYSIS OF ADHESIONS N/A 2/13/2020    Procedure: LYSIS, ADHESIONS;  Surgeon: Robbi Lovell III, MD;  Location: [facility] OR;  Service: General;  Laterality: N/A;    NECK SURGERY      PERCUTANEOUS TRANSLUMINAL ANGIOPLASTY OF ARTERIOVENOUS FISTULA N/A 1/19/2022    Procedure: PTA, AV FISTULA;  Surgeon: Raz Maher MD;  Location: [facility] CATH/EP LAB;  Service: Cardiothoracic;  Laterality: N/A;    PHLEBOGRAPHY  2/28/2020    Procedure: VENOGRAM;  Surgeon: Robbi Lovell III, MD;  Location: [facility] OR;  Service: General;;    REMOVAL OF VASCULAR ACCESS PORT Right 2/13/2020    Procedure: REMOVAL, VASCULAR ACCESS PORT;  Surgeon: Robbi Lovell III, MD;  Location: [facility] OR;  Service: General;  Laterality: Right;    RETROGRADE PYELOGRAPHY  3/16/2021    Procedure: PYELOGRAM, RETROGRADE;  Surgeon: Damari Barber Jr., MD;  Location: [facility] OR;  Service: Urology;;    RETROGRADE PYELOGRAPHY Right 5/18/2021    Procedure: PYELOGRAM, RETROGRADE;  Surgeon: Damari Barber Jr., MD;  Location: [facility] OR;  Service: Urology;  Laterality: Right;    RETROGRADE PYELOGRAPHY  6/22/2022    Procedure: PYELOGRAM, RETROGRADE;  Surgeon: Gaby Garcia MD;  Location: [facility] OR;  Service: Urology;;    SHOULDER SURGERY      URETEROSCOPIC REMOVAL OF URETERIC CALCULUS Right 3/30/2021    Procedure: REMOVAL, CALCULUS, URETER, URETEROSCOPIC;  Surgeon: Damari Barber Jr., MD;  Location: [facility] OR;  Service: Urology;  Laterality: Right;    URETEROSCOPIC REMOVAL OF URETERIC CALCULUS Right  "2021    Procedure: REMOVAL, CALCULUS, URETER, URETEROSCOPIC;  Surgeon: Damari Barber Jr., MD;  Location: [facility];  Service: Urology;  Laterality: Right;    vulva cancer       Social History     Tobacco Use    Smoking status: Former Smoker     Packs/day: 1.00     Years: 33.00     Pack years: 33.00     Types: Cigarettes     Quit date: 2000     Years since quittin.5    Smokeless tobacco: Former User     Quit date:    Substance Use Topics    Alcohol use: No        Family History   Problem Relation Age of Onset    Heart disease Mother     Hypertension Mother     Cancer Mother         lung    Heart disease Father     Heart disease Sister         Open heart surgery    No Known Problems Daughter          Review of Systems    Constitutional: No chills, sweats,but has fatigue, weakness,     Eyes: No change in vision, loss of vision or diplopia, photophobia    ENT: No sore throat, mouth pains, or lesions    Cardiovascular: No chest pain, ALLRED, or pedal edema    Respiratory: No shortness of breath, ALLRED, with continued cough,      Gastrointestinal: No abdominal pain, but mild nausea,no vomiting,chronic diarrhea, and frequently unrecognized constipation, despite the chronic loose stools    Genitourinary: No dysuria, hematuria, though she feels that she is still passing some kidney stones    Musculoskeletal: No new pain, joint swelling     Integumentary: No new rashes, but bilateral forearm abrasions today    Neurological: No dizziness, vertigo, unusual headaches, neuropathy, loss of vision     Psychiatric: No anxiety, depression    Endocrine: Blood sugars are well controlled    Lymphatic: No lymphadenopathy,   malignancy    VAD: right arm midline    Objective:      Pulse 94, temperature 99.1 °F (37.3 °C), height 5' 7.5" (1.715 m), weight 73.3 kg (161 lb 8 oz), SpO2 96 %. Body mass index is 24.92 kg/m².  Physical Exam      General: Alert and attentive, cooperative and in no distress. Thin and fatigued " looking    Eyes: Pupils equal, round, reactive to light, anicteric, EOMI    Neck: Supple, non-tender, no thyromegaly or masses    ENT: EAC patent, TM normal, nares patent, no oral lesions, teeth in good condition, no thrush    Cardiovascular: Regular rate and rhythm, no murmurs, rubs, or gallop    Respiratory: Lungs clear without wheezes, rales, rubs or rhonci but has a harsh cough    Gastrointestinal:  Soft, bowel sounds ileus like, no mass or organomegaly, no tenderness or distention    Genitourinary:   no flank tenderness    Integumentary: Skin without rashes,    Vascular: No peripheral edema or phlebitis, warm and well perfused    Musculoskeletal: Ambulates without difficulty, no acute arthritis, synovitis or myositis. Normal muscle bulk and strength    Lymphatic: No cervical, axillary, or inguinal LAD    Neurological: Normal LOC, cranial nerves, speech,   gait    Psychiatric: Normal mood, speech,  demeanor     Wound:    VAD: right arm midline without redness or phlebitis       Recent Diagnostics:   Reviewed both recent hospitalizations       Assessment and Plan:           Emphysematous pyelonephritis of right kidney  -     CT Renal Stone Study ABD Pelvis WO; Future; Expected date: 07/07/2022  -     SUBSEQUENT HOME HEALTH ORDERS    Urinary tract infection due to ESBL Klebsiella    History of COVID-19  -     X-Ray Chest PA And Lateral; Future    Encounter for long-term (current) use of antibiotics  -     Ambulatory referral/consult to Infusion Dept; Future; Expected date: 07/14/2022  -     SUBSEQUENT HOME HEALTH ORDERS    Ileus    Diarrhea, unspecified type    Nephrolithiasis    ESRD on dialysis MWF    Increase budesonide nebulizer treatment to 3 times a day  It is ok to take OTC cough medication like robitussin DM    Continue meropenem for another week, pending repeat CT scan of your kidneys ASAP today    CXR today    Midline site care today in [facility] and next week if we extend further    Return next Thursday at  noon    Addendum: reviewed CXR (improved) and CT RSS, improved. Not as much stool as previous CT but dilated bowel.  Called and advised of results. Still intend to do a little catharsis, plus/minus some gas X.  We will continue with the additional IV merrem and I will pull midline next week.       This note was created using Dragon voice recognition software that occasionally misinterpreted phrases or words.

## 2022-07-07 NOTE — PROGRESS NOTES
[facility] Medicine Progress Note    Subjective:      Underwent cysto with Dr. Garcia today. No apparent perioperative complications.   Pain well controlled.   UOP ok.     :  No major issues overnight. Afebrile.     :   C/o nausea this morning along with bloating. Lactuolose started per ID.   Afebrile.      Objective:   Last 24 Hour Vital Signs:  BP  Min: 116/67  Max: 186/84  Temp  Av.6 °F (37 °C)  Min: 98.1 °F (36.7 °C)  Max: 99.3 °F (37.4 °C)  Pulse  Av.5  Min: 71  Max: 92  Resp  Av  Min: 18  Max: 18  SpO2  Av.6 %  Min: 93 %  Max: 97 %  I/O last 3 completed shifts:  In: 1055 [P.O.:1005; IV Piggyback:50]  Out: 1350 [Urine:1350]    Physical Examination:  Physical Exam  Vitals and nursing note reviewed.   Constitutional:       Appearance: She is well-developed.   HENT:      Head: Normocephalic and atraumatic.      Right Ear: External ear normal.      Left Ear: External ear normal.      Nose: Nose normal.   Eyes:      Conjunctiva/sclera: Conjunctivae normal.      Pupils: Pupils are equal, round, and reactive to light.   Cardiovascular:      Rate and Rhythm: Normal rate and regular rhythm.      Heart sounds: Normal heart sounds.   Pulmonary:      Effort: Pulmonary effort is normal.      Breath sounds: Normal breath sounds.   Abdominal:      General: Bowel sounds are normal.      Palpations: Abdomen is soft.   Genitourinary:     Comments: Right flank pain to percussion  Musculoskeletal:         General: Normal range of motion.      Cervical back: Normal range of motion and neck supple.   Skin:     General: Skin is warm and dry.      Capillary Refill: Capillary refill takes less than 2 seconds.      Comments: Ecchymoses various stages arms   Neurological:      Mental Status: She is alert and oriented to person, place, and time.   Psychiatric:         Behavior: Behavior normal.         Thought Content: Thought content normal.         Judgment: Judgment normal.       Laboratory:  Laboratory Data Reviewed: yes    Most Recent Data:  CBC:   Lab Results   Component Value Date    WBC 13.08 (H) 06/24/2022    HGB 11.2 (L) 06/24/2022    HCT 36.3 (L) 06/24/2022     (L) 06/24/2022     (H) 06/24/2022    RDW 13.9 06/24/2022     BMP:   Lab Results   Component Value Date     06/22/2022    K 3.6 06/22/2022     06/22/2022    CO2 25 06/22/2022    BUN 41 (H) 06/22/2022     06/22/2022    CALCIUM 8.3 (L) 06/22/2022    MG 2.0 06/18/2022    PHOS 4.0 06/18/2022     LFTs:   Lab Results   Component Value Date    PROT 5.6 (L) 06/22/2022    ALBUMIN 3.2 (L) 06/22/2022    BILITOT 0.6 06/22/2022    AST 12 06/22/2022    ALKPHOS 98 06/22/2022    ALT 12 06/22/2022     Coags:   Lab Results   Component Value Date    INR 1.4 09/15/2021     FLP:   Lab Results   Component Value Date    CHOL 95 (L) 05/25/2021    HDL 57 05/25/2021    LDLCALC 23.4 (L) 05/25/2021    TRIG 73 05/25/2021    CHOLHDL 60.0 (H) 05/25/2021     DM:   Lab Results   Component Value Date    HGBA1C 4.6 05/25/2021    HGBA1C SEE COMMENT 01/22/2020    HGBA1C Invalid (A) 11/15/2019    LDLCALC 23.4 (L) 05/25/2021    CREATININE 5.1 (H) 06/22/2022     Thyroid:   Lab Results   Component Value Date    TSH 2.160 06/22/2022    FREET4 0.74 06/16/2022    Z2IXMQD 7.3 02/24/2020    I5YFQMN 44 (L) 02/24/2020     Anemia:   Lab Results   Component Value Date    IRON 87 08/28/2020    IRON 87 08/28/2020    TIBC 269 08/28/2020    FERRITIN 378 (H) 12/08/2020    IACGFNJI05 507 06/17/2022    FOLATE 13.4 06/17/2022     Cardiac:   Lab Results   Component Value Date    TROPONINI <0.030 06/17/2022     (H) 06/16/2022     Urinalysis:   Lab Results   Component Value Date    LABURIN (A) 06/22/2022     KLEBSIELLA OXYTOCA ESBL  10,000 - 49,999 cfu/ml  For susceptibility see order #[id number]      COLORU Yellow 06/22/2022    SPECGRAV 1.010 06/22/2022    NITRITE Negative 06/22/2022    KETONESU Negative 06/22/2022    UROBILINOGEN Negative  06/22/2022    WBCUA >100 (H) 06/22/2022        Radiology:  Data Reviewed: yes  CT RENAL STONE STUDY ABD PELVIS WO  FL FLOURO USAGE      XR CHEST 1 VIEW  XR ABDOMEN AP 1 VIEW      Current Medications:     Infusions:       Scheduled:   diltiaZEM  120 mg Oral Daily    epoetin nica-ebpx (RETACRIT) injection  50 Units/kg Intravenous Every Mon, Wed, Fri    gabapentin  100 mg Oral BID    heparin (porcine)  5,000 Units Subcutaneous Q8H    hydrALAZINE  50 mg Oral Q8H    Lactobacillus acidoph-L.bulgar  4 tablet Oral BID WM    lactulose  20 g Oral BID    levothyroxine  88 mcg Oral Before breakfast    LORazepam  0.5 mg Oral QHS    meropenem (MERREM) IVPB  500 mg Intravenous Q12H    mupirocin   Nasal BID    rOPINIRole  0.5 mg Oral Q8H    sertraline  50 mg Oral QHS    sevelamer carbonate  800 mg Oral TID WM        PRN:  acetaminophen, albuterol-ipratropium, bisacodyL, melatonin, metoclopramide HCl, morphine, morphine, ondansetron, promethazine    Lines and Day Number of Therapy:      Microbiology Data:  Reviewed: yes  Microbiology Results (last 7 days)     Procedure Component Value Units Date/Time    Urine culture [[id number]]  (Abnormal) Collected: 06/22/22 1550    Order Status: Completed Specimen: Urine Updated: 06/24/22 0722     Urine Culture, Routine KLEBSIELLA OXYTOCA ESBL  10,000 - 49,999 cfu/ml  For susceptibility see order #[id number]      Narrative:      Specimen Source->Urine    Blood culture [[id number]] Collected: 06/21/22 1937    Order Status: Completed Specimen: Blood Updated: 06/24/22 0232     Blood Culture, Routine No Growth to date      No Growth to date      No Growth to date    Urine culture [[id number]]  (Abnormal)  (Susceptibility) Collected: 06/21/22 1306    Order Status: Completed Specimen: Urine Updated: 06/23/22 0626     Urine Culture, Routine KLEBSIELLA OXYTOCA ESBL  >100,000 cfu/ml  Known ESBL patient      Narrative:      Specimen Source->Urine           Antibiotics and Day Number of  "Therapy:  Antibiotics (From admission, onward)            Start     Stop Route Frequency Ordered    06/22/22 1015  mupirocin 2 % ointment         06/27 0859 Nasl 2 times daily 06/22/22 0906    06/22/22 0100  meropenem-0.9% sodium chloride 500 mg/50 mL IVPB        Note to Pharmacy: Ht: 5' 7" (1.702 m)  Wt: 66.7 kg (147 lb)  Estimated Creatinine Clearance: 11.5 mL/min (A) (based on SCr of 4.3 mg/dL (H)).   Body mass index is 23.02 kg/m².    -- IV Every 12 hours (non-standard times) 06/21/22 2014         Antivirals (From admission, onward)    None             Assessment/Plan:     Shara Hutchins is a 72 y.o.female with     ESRD on dialysis MWF  Nephrology for RRT      Nephrolithiasis  S/p cystoscopy per urology       Right-sided pyelonephritis  Plan merrem x 14d on discharge            Porfirio Hurst  [facility] Medicine    " Additional Safety/Bands:

## 2022-07-07 NOTE — PATIENT INSTRUCTIONS
Increase budesonide nebulizer treatment to 3 times a day  It is ok to take OTC cough medication like robitussin DM    Continue meropenem for another week, pending repeat CT scan of your kidneys    CXR today    Midline site care today in [facility] and next week if we extend further        Return next Thursday at noon

## 2022-07-08 NOTE — PROGRESS NOTES
[facility]rology Established Patient/H&P:    Shara Hutchins is a 72 y.o. female who presents for follow up for recurrent right pyelonephritis.     Patient with a history of HTN, vulvar cancer s/p chemo/XRT in 2013 and CKD who presented to the [facility] emergency department on 2/23/20 with abdominal pain, vaginal bleeding, DARCI  (3.8 from 2.5 baseline) and a 20 pound weight loss. Lee was placed on admission to monitor UOP. CTRSS 2/12 2/23 showed bilateral nonobstructing renal stones.      She underwent loop ileostomy on 2/13/20 for chronic diarrhea. Renal ultrasound on 2/26 showed no hydro, b renal stones and indeterminate artifact. She went to [facility] on 3/7 and returned and stated gross hematuria started after this. Prior to admission pt denied having gross hematuria, urinary frequency, vaginal bleeding or frequent uti's. She did however state that her vulvar itching had returned and she was concerned about recurrence of cancer with no recent f/u by gyn onc. She hasd seen by  inpt and plan was for outpt f/u with .      She had been awaiting LTAC placement on 3/13. Consulted initially for persistent hematuria. The plan was for outpt cysto to eval for any bladder path not seen on ct and discussion of renal stones as well as discharging with lee. Her hematuria continued, ucx on 3/15 grew Klebsiella and she was started on rocephin and completed a 5d course. Around 3/17  she was given a voiding trial vs lee exchange? on 3/17 for larger 18fr.       She continued to have hematuria requiring intermittent irrigation and 2 transfusions. Urology was re-consulted 3/24, Dr. Garcia asked them to remove the 18 Fr, place a 22 [place] 3 way. CBI weaned off. She underwent ileostomy reversal on 5/18/20. Discharged from the hospital on 5/23/20.        Interval History     6/10/20: Patient presents today with hematuria for one day. She was prescribed Pyridium and Macrodantin for presumed  UTI.       6/16/20: Patient presented today [facility] ED on 6/14/20 with worsening gross hematuria. Urology consulted to assist with management. States she became light-headed and dizzy at home due to anemia.      6/25/20: She is s/p cystoscopy with fulguration on 6/16/20. Placed on CBI overnight for monitoring. Urine remained clear and patient discharged from [facility]. She presents today with her lee in place and clear urine.Here for voiding trial.      3/16/21: Patient presented to [facility] ED on 3/15/21 complaining of a 2 days history of right flank pain associated with chills and nausea. CT abdomen/pelvis without contrast revealed several distal right UVJ stones - largest measuring 8 mm with associated moderate right hydroureteronephrosis. Also with bilateral non-obstructing nephrolithiasis. Urine culture with gram negative rods.      4/20/21: She is s/p right ureteroscopy with laser lithotripsy and stent exchange on 3/30/21. Several impacted stones identified and lasered. Second look-ureteroscopy today.      5/13/21: She underwent second look ureteroscopy on 4/20/21. Stent removed in clinic today at bedside. Patient with chronic diarrhea. Stones are calcium oxalate monohydrate.      5/18/21: Patient presented to the ED yesterday with fever, chills and recurrent right flank pain after stent removal. CT renal stone with recurrent hydroureteronephrosis. Underwent right urgent stent placement and removal of several bladder stones. Known right ureteral stricture. Planned for follow up with stent exchange at a later date, but did not follow up.      1/18/22: Patient admitted at [facility] for vulvar cellulitis. CT abdomen pelvis revealed severe right hydroureteronephrosis with the stent in place and a 6 mm mid-ureteral stone. Urology consulted to assist with management. No fever or chills. Urine culture negative. Remains on dialysis.     6/24/22:  She underwent cystoscopy and right ureteral stent exchange on 1/18/22 - 7 x 24 cm JJ stent.  Discharged in stable condition and has since presented to [facility] on 6/21/22 with right flank pain and UA concerning for infection. 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  She is now status post right stent exchange with Dr. Garcia on 06/22/2022.  Stent found to be encrusted.  Urine culture with Klebsiella.  No complaints today.    7/8/22: She was re-admitted to the hospital for COVID pneumonia and discharged on 7/1/22. CT renal stone yesterday with resolution of previous air. Numerous bilateral stones and right ureteral stones along the course of the stent.      She denies any bone pain, weight loss or  trauma.        Urine culture  Klebsiella  6/22/22  Klebsiella  6/21/22  Klebsiella  4/23/22  No growth                    1/18/22  Multi orgs                    4/13/21  GNRs                          3/15/21  E. Coli                          12/8/20  Multiple organisms      12/9/20  No growth                    6/16/20  Enterobacter               6/14/20  E. Coli                          6/3/20  Enterococcus              5/21/20  Pseudomonas             3/24/20                 Urine cytology  Negative                      6/16/20    Past Medical History:   Diagnosis Date    Cellulitis of trunk     Perineum, numerous episodes    Chronic diarrhea 02/10/2020    Short-gut syndrome    Diabetes mellitus, type 2     pt has not had for years after losing weight    End stage renal disease on dialysis 01/07/2018    Fatigue     Hypertension     Hypothyroidism 2/10/2020    Iron deficiency anemia due to chronic blood loss 1/7/2018    Pulmonary hypertension 3/20/2020    Vulvar cancer 1/7/2019       Past Surgical History:   Procedure Laterality Date    ABDOMINAL SURGERY      ANGIOGRAPHY OF ARTERIOVENOUS SHUNT N/A 1/19/2022    Procedure: Fistulogram with Possible Intervention;  Surgeon: Raz Maher MD;  Location: [facility] CATH/EP LAB;  Service: Cardiothoracic;   Laterality: N/A;    APPENDECTOMY      AV FISTULA PLACEMENT Left 2018    BACK SURGERY      BLADDER FULGURATION N/A 6/16/2020    Procedure: FULGURATION, BLADDER;  Surgeon: Damari Barber Jr., MD;  Location: [facility];  Service: Urology;  Laterality: N/A;    carpel tunnel surgery once on each hand      CERVICAL FUSION      x 4    cervix repair      CHOLECYSTECTOMY  2000    COLECTOMY      CYSTOSCOPY W/ RETROGRADES  6/16/2020    Procedure: CYSTOSCOPY, WITH RETROGRADE PYELOGRAM;  Surgeon: Damari Barber Jr., MD;  Location: [facility] OR;  Service: Urology;;    CYSTOSCOPY W/ RETROGRADES Right 4/20/2021    Procedure: CYSTOSCOPY, WITH RETROGRADE PYELOGRAM;  Surgeon: Damari Barber Jr., MD;  Location: [facility] OR;  Service: Urology;  Laterality: Right;    CYSTOSCOPY W/ URETERAL STENT PLACEMENT Right 3/16/2021    Procedure: CYSTOSCOPY, WITH URETERAL STENT INSERTION;  Surgeon: Damari Barber Jr., MD;  Location: [facility] OR;  Service: Urology;  Laterality: Right;    CYSTOSCOPY W/ URETERAL STENT PLACEMENT Right 5/18/2021    Procedure: CYSTOSCOPY, WITH URETERAL STENT INSERTION;  Surgeon: Damari Barber Jr., MD;  Location: [facility] OR;  Service: Urology;  Laterality: Right;    CYSTOSCOPY W/ URETERAL STENT PLACEMENT Right 6/22/2022    Procedure: CYSTOSCOPY, WITH URETERAL STENT INSERTION;  Surgeon: Gaby Garcia MD;  Location: [facility] OR;  Service: Urology;  Laterality: Right;  cysto stent exchange    CYSTOSCOPY W/ URETERAL STENT REMOVAL Right 4/20/2021    Procedure: CYSTOSCOPY, WITH URETERAL STENT REMOVAL;  Surgeon: Damari Barber Jr., MD;  Location: [facility] OR;  Service: Urology;  Laterality: Right;    CYSTOSCOPY WITH URETEROSCOPY, RETROGRADE PYELOGRAPHY, AND INSERTION OF STENT Right 3/30/2021    Procedure: CYSTOSCOPY, WITH RETROGRADE PYELOGRAM AND URETERAL STENT INSERTION;  Surgeon: Damair Barber Jr., MD;  Location: [facility] OR;  Service: Urology;  Laterality: Right;    CYSTOURETEROSCOPY WITH RETROGRADE PYELOGRAPHY AND INSERTION OF  STENT INTO URETER Right 1/18/2022    Procedure: CYSTOURETEROSCOPY, WITH RETROGRADE PYELOGRAM AND URETERAL STENT INSERTION;  Surgeon: Damari Barber Jr., MD;  Location: [facility] OR;  Service: Urology;  Laterality: Right;    DIAGNOSTIC LAPAROSCOPY N/A 2/13/2020    Procedure: LAPAROSCOPY, DIAGNOSTIC;  Surgeon: Robbi Lovell III, MD;  Location: [facility] OR;  Service: General;  Laterality: N/A;    HYSTERECTOMY  1980    ILEOSTOMY N/A 2/13/2020    Procedure: CREATION, ILEOSTOMY Loop;  Surgeon: Robbi Lovell III, MD;  Location: [facility] OR;  Service: General;  Laterality: N/A;    ILEOSTOMY CLOSURE N/A 5/18/2020    Procedure: CLOSURE, ILEOSTOMY;  Surgeon: Robbi Lovell III, MD;  Location: [facility] OR;  Service: General;  Laterality: N/A;    LASER LITHOTRIPSY Right 3/30/2021    Procedure: LITHOTRIPSY, USING LASER;  Surgeon: Damari Barber Jr., MD;  Location: [facility] OR;  Service: Urology;  Laterality: Right;    LUMBAR LAMINECTOMY      LYSIS OF ADHESIONS N/A 2/13/2020    Procedure: LYSIS, ADHESIONS;  Surgeon: Robbi Lovell III, MD;  Location: [facility] OR;  Service: General;  Laterality: N/A;    NECK SURGERY      PERCUTANEOUS TRANSLUMINAL ANGIOPLASTY OF ARTERIOVENOUS FISTULA N/A 1/19/2022    Procedure: PTA, AV FISTULA;  Surgeon: Raz Maher MD;  Location: [facility] CATH/EP LAB;  Service: Cardiothoracic;  Laterality: N/A;    PHLEBOGRAPHY  2/28/2020    Procedure: VENOGRAM;  Surgeon: Robbi Lovell III, MD;  Location: [facility] OR;  Service: General;;    REMOVAL OF VASCULAR ACCESS PORT Right 2/13/2020    Procedure: REMOVAL, VASCULAR ACCESS PORT;  Surgeon: Robbi Lovell III, MD;  Location: [facility] OR;  Service: General;  Laterality: Right;    RETROGRADE PYELOGRAPHY  3/16/2021    Procedure: PYELOGRAM, RETROGRADE;  Surgeon: Damari Barber Jr., MD;  Location: [facility] OR;  Service: Urology;;    RETROGRADE PYELOGRAPHY Right 5/18/2021    Procedure: PYELOGRAM, RETROGRADE;  Surgeon: Damari Barber Jr., MD;  Location: [facility];   "Service: Urology;  Laterality: Right;    RETROGRADE PYELOGRAPHY  6/22/2022    Procedure: PYELOGRAM, RETROGRADE;  Surgeon: Gaby Garcia MD;  Location: [facility] OR;  Service: Urology;;    SHOULDER SURGERY      URETEROSCOPIC REMOVAL OF URETERIC CALCULUS Right 3/30/2021    Procedure: REMOVAL, CALCULUS, URETER, URETEROSCOPIC;  Surgeon: Damari Barber Jr., MD;  Location: [facility] OR;  Service: Urology;  Laterality: Right;    URETEROSCOPIC REMOVAL OF URETERIC CALCULUS Right 4/20/2021    Procedure: REMOVAL, CALCULUS, URETER, URETEROSCOPIC;  Surgeon: Damari Barber Jr., MD;  Location: [facility] OR;  Service: Urology;  Laterality: Right;    vulva cancer         Review of patient's allergies indicates:   Allergen Reactions    Ciprofloxacin Other (See Comments)     Elevated liver enzymes      Pcn [penicillins] Anaphylaxis     TOLERATES ROCEPHIN WITHOUT DIFFICULTY       Medications Reviewed: see MAR    FOCUSED PHYSICAL EXAM:    Vitals:    07/08/22 1352   BP: (!) 114/52   Pulse: 85     Body mass index is 23.29 kg/m². Weight: 67.4 kg (148 lb 11.2 oz) Height: 5' 7" (170.2 cm)       General: Alert, cooperative, no distress, appears stated age  Abdomen: Soft, non-tender, no CVA tenderness, non-distended      LABS:    Recent Results (from the past 336 hour(s))   CBC auto differential    Collection Time: 06/25/22  5:10 AM   Result Value Ref Range    WBC 13.01 (H) 3.90 - 12.70 K/uL    RBC 3.02 (L) 4.00 - 5.40 M/uL    Hemoglobin 10.8 (L) 12.0 - 16.0 g/dL    Hematocrit 34.4 (L) 37.0 - 48.5 %     (H) 82 - 98 fL    MCH 35.8 (H) 27.0 - 31.0 pg    MCHC 31.4 (L) 32.0 - 36.0 g/dL    RDW 14.1 11.5 - 14.5 %    Platelets 141 (L) 150 - 450 K/uL    MPV 10.7 9.2 - 12.9 fL    Immature Granulocytes 0.8 (H) 0.0 - 0.5 %    Gran # (ANC) 11.0 (H) 1.8 - 7.7 K/uL    Immature Grans (Abs) 0.10 (H) 0.00 - 0.04 K/uL    Lymph # 0.9 (L) 1.0 - 4.8 K/uL    Mono # 0.8 0.3 - 1.0 K/uL    Eos # 0.2 0.0 - 0.5 K/uL    Baso # 0.02 0.00 - 0.20 K/uL    nRBC 0 0 " /100 WBC    Gran % 84.4 (H) 38.0 - 73.0 %    Lymph % 6.8 (L) 18.0 - 48.0 %    Mono % 6.1 4.0 - 15.0 %    Eosinophil % 1.7 0.0 - 8.0 %    Basophil % 0.2 0.0 - 1.9 %    Differential Method Automated    CBC auto differential    Collection Time: 06/28/22  5:39 PM   Result Value Ref Range    WBC 10.34 3.90 - 12.70 K/uL    RBC 2.97 (L) 4.00 - 5.40 M/uL    Hemoglobin 10.5 (L) 12.0 - 16.0 g/dL    Hematocrit 32.7 (L) 37.0 - 48.5 %     (H) 82 - 98 fL    MCH 35.4 (H) 27.0 - 31.0 pg    MCHC 32.1 32.0 - 36.0 g/dL    RDW 14.5 11.5 - 14.5 %    Platelets 152 150 - 450 K/uL    MPV 10.3 9.2 - 12.9 fL    Immature Granulocytes 1.0 (H) 0.0 - 0.5 %    Gran # (ANC) 7.7 1.8 - 7.7 K/uL    Immature Grans (Abs) 0.10 (H) 0.00 - 0.04 K/uL    Lymph # 1.0 1.0 - 4.8 K/uL    Mono # 1.0 0.3 - 1.0 K/uL    Eos # 0.5 0.0 - 0.5 K/uL    Baso # 0.02 0.00 - 0.20 K/uL    nRBC 0 0 /100 WBC    Gran % 74.4 (H) 38.0 - 73.0 %    Lymph % 9.6 (L) 18.0 - 48.0 %    Mono % 10.0 4.0 - 15.0 %    Eosinophil % 4.8 0.0 - 8.0 %    Basophil % 0.2 0.0 - 1.9 %    Differential Method Automated    Comprehensive metabolic panel    Collection Time: 06/28/22  5:39 PM   Result Value Ref Range    Sodium 140 136 - 145 mmol/L    Potassium 3.2 (L) 3.5 - 5.1 mmol/L    Chloride 112 (H) 95 - 110 mmol/L    CO2 12 (L) 23 - 29 mmol/L    Glucose 89 70 - 110 mg/dL    BUN 46 (H) 8 - 23 mg/dL    Creatinine 7.8 (H) 0.5 - 1.4 mg/dL    Calcium 8.5 (L) 8.7 - 10.5 mg/dL    Total Protein 6.1 6.0 - 8.4 g/dL    Albumin 2.9 (L) 3.5 - 5.2 g/dL    Total Bilirubin 0.9 0.1 - 1.0 mg/dL    Alkaline Phosphatase 293 (H) 55 - 135 U/L    AST 15 10 - 40 U/L    ALT 30 10 - 44 U/L    Anion Gap 16 8 - 16 mmol/L    eGFR if African American 5 (A) >60 mL/min/1.73 m^2    eGFR if non African American 5 (A) >60 mL/min/1.73 m^2   Troponin I    Collection Time: 06/28/22  5:39 PM   Result Value Ref Range    Troponin I 0.022 0.000 - 0.026 ng/mL   COVID-19 Rapid Screening    Collection Time: 06/28/22  5:44 PM   Result  Value Ref Range    SARS-CoV-2 RNA, Amplification, Qual Positive (A) Negative   Influenza A & B by Molecular    Collection Time: 06/28/22  5:44 PM    Specimen: Nasopharyngeal Swab   Result Value Ref Range    Influenza A, Molecular Negative Negative    Influenza B, Molecular Negative Negative    Flu A & B Source Nasal swab    PT/INR    Collection Time: 06/28/22  9:47 PM   Result Value Ref Range    Prothrombin Time 12.8 (H) 9.0 - 12.5 sec    INR 1.2 0.8 - 1.2   PTT    Collection Time: 06/28/22  9:47 PM   Result Value Ref Range    aPTT 30.8 21.0 - 32.0 sec   Sedimentation rate    Collection Time: 06/28/22  9:47 PM   Result Value Ref Range    Sed Rate 14 0 - 20 mm/Hr   CK    Collection Time: 06/28/22  9:47 PM   Result Value Ref Range    CPK 40 20 - 180 U/L   Lactate Dehydrogenase    Collection Time: 06/28/22  9:47 PM   Result Value Ref Range     (H) 110 - 260 U/L   Ferritin    Collection Time: 06/28/22  9:47 PM   Result Value Ref Range    Ferritin 831 (H) 20.0 - 300.0 ng/mL   D-Dimer, Quantitative    Collection Time: 06/28/22  9:47 PM   Result Value Ref Range    D-Dimer 1.96 (H) <0.50 mg/L FEU   Brain natriuretic peptide    Collection Time: 06/28/22  9:47 PM   Result Value Ref Range     (H) 0 - 99 pg/mL   Lactic Acid, Plasma    Collection Time: 06/28/22  9:47 PM   Result Value Ref Range    Lactate (Lactic Acid) 0.8 0.5 - 2.2 mmol/L   APTT    Collection Time: 06/28/22  9:47 PM   Result Value Ref Range    aPTT 30.8 21.0 - 32.0 sec   Protime-INR    Collection Time: 06/28/22  9:47 PM   Result Value Ref Range    Prothrombin Time 12.8 (H) 9.0 - 12.5 sec    INR 1.2 0.8 - 1.2   CBC auto differential    Collection Time: 06/28/22  9:47 PM   Result Value Ref Range    WBC 10.68 3.90 - 12.70 K/uL    RBC 2.93 (L) 4.00 - 5.40 M/uL    Hemoglobin 10.6 (L) 12.0 - 16.0 g/dL    Hematocrit 32.4 (L) 37.0 - 48.5 %     (H) 82 - 98 fL    MCH 36.2 (H) 27.0 - 31.0 pg    MCHC 32.7 32.0 - 36.0 g/dL    RDW 14.4 11.5 - 14.5 %     Platelets 117 (L) 150 - 450 K/uL    MPV 10.4 9.2 - 12.9 fL    Immature Granulocytes 0.9 (H) 0.0 - 0.5 %    Gran # (ANC) 7.9 (H) 1.8 - 7.7 K/uL    Immature Grans (Abs) 0.10 (H) 0.00 - 0.04 K/uL    Lymph # 1.3 1.0 - 4.8 K/uL    Mono # 1.0 0.3 - 1.0 K/uL    Eos # 0.4 0.0 - 0.5 K/uL    Baso # 0.02 0.00 - 0.20 K/uL    nRBC 0 0 /100 WBC    Gran % 73.9 (H) 38.0 - 73.0 %    Lymph % 12.2 (L) 18.0 - 48.0 %    Mono % 9.1 4.0 - 15.0 %    Eosinophil % 3.7 0.0 - 8.0 %    Basophil % 0.2 0.0 - 1.9 %    Platelet Estimate Appears normal     Differential Method Automated    POCT glucose    Collection Time: 06/28/22 10:40 PM   Result Value Ref Range    POCT Glucose 83 70 - 110 mg/dL   CBC auto differential    Collection Time: 06/29/22  5:16 AM   Result Value Ref Range    WBC 8.21 3.90 - 12.70 K/uL    RBC 2.85 (L) 4.00 - 5.40 M/uL    Hemoglobin 10.0 (L) 12.0 - 16.0 g/dL    Hematocrit 31.3 (L) 37.0 - 48.5 %     (H) 82 - 98 fL    MCH 35.1 (H) 27.0 - 31.0 pg    MCHC 31.9 (L) 32.0 - 36.0 g/dL    RDW 14.4 11.5 - 14.5 %    Platelets 124 (L) 150 - 450 K/uL    MPV 10.4 9.2 - 12.9 fL    Immature Granulocytes 1.3 (H) 0.0 - 0.5 %    Gran # (ANC) 5.8 1.8 - 7.7 K/uL    Immature Grans (Abs) 0.11 (H) 0.00 - 0.04 K/uL    Lymph # 1.2 1.0 - 4.8 K/uL    Mono # 0.7 0.3 - 1.0 K/uL    Eos # 0.4 0.0 - 0.5 K/uL    Baso # 0.03 0.00 - 0.20 K/uL    nRBC 0 0 /100 WBC    Gran % 70.9 38.0 - 73.0 %    Lymph % 14.1 (L) 18.0 - 48.0 %    Mono % 9.0 4.0 - 15.0 %    Eosinophil % 4.3 0.0 - 8.0 %    Basophil % 0.4 0.0 - 1.9 %    Differential Method Automated    APTT    Collection Time: 06/29/22  5:47 AM   Result Value Ref Range    aPTT 35.6 (H) 21.0 - 32.0 sec   POCT glucose    Collection Time: 06/29/22  7:28 AM   Result Value Ref Range    POCT Glucose 82 70 - 110 mg/dL   POCT glucose    Collection Time: 06/29/22 11:06 AM   Result Value Ref Range    POCT Glucose 86 70 - 110 mg/dL   APTT    Collection Time: 06/29/22 11:15 AM   Result Value Ref Range    aPTT 33.6 (H)  21.0 - 32.0 sec   CBC Auto Differential    Collection Time: 06/29/22  4:00 PM   Result Value Ref Range    WBC 10.06 3.90 - 12.70 K/uL    RBC 2.87 (L) 4.00 - 5.40 M/uL    Hemoglobin 10.4 (L) 12.0 - 16.0 g/dL    Hematocrit 31.0 (L) 37.0 - 48.5 %     (H) 82 - 98 fL    MCH 36.2 (H) 27.0 - 31.0 pg    MCHC 33.5 32.0 - 36.0 g/dL    RDW 14.4 11.5 - 14.5 %    Platelets 142 (L) 150 - 450 K/uL    MPV 10.5 9.2 - 12.9 fL    Immature Granulocytes 1.2 (H) 0.0 - 0.5 %    Gran # (ANC) 9.5 (H) 1.8 - 7.7 K/uL    Immature Grans (Abs) 0.12 (H) 0.00 - 0.04 K/uL    Lymph # 0.4 (L) 1.0 - 4.8 K/uL    Mono # 0.1 (L) 0.3 - 1.0 K/uL    Eos # 0.0 0.0 - 0.5 K/uL    Baso # 0.02 0.00 - 0.20 K/uL    nRBC 0 0 /100 WBC    Gran % 94.0 (H) 38.0 - 73.0 %    Lymph % 3.6 (L) 18.0 - 48.0 %    Mono % 0.8 (L) 4.0 - 15.0 %    Eosinophil % 0.2 0.0 - 8.0 %    Basophil % 0.2 0.0 - 1.9 %    Differential Method Automated    CBC with Automated Differential    Collection Time: 06/30/22  4:58 AM   Result Value Ref Range    WBC 6.78 3.90 - 12.70 K/uL    RBC 2.63 (L) 4.00 - 5.40 M/uL    Hemoglobin 9.5 (L) 12.0 - 16.0 g/dL    Hematocrit 27.9 (L) 37.0 - 48.5 %     (H) 82 - 98 fL    MCH 36.1 (H) 27.0 - 31.0 pg    MCHC 34.1 32.0 - 36.0 g/dL    RDW 14.1 11.5 - 14.5 %    Platelets 142 (L) 150 - 450 K/uL    MPV 10.9 9.2 - 12.9 fL    Immature Granulocytes 1.0 (H) 0.0 - 0.5 %    Gran # (ANC) 5.6 1.8 - 7.7 K/uL    Immature Grans (Abs) 0.07 (H) 0.00 - 0.04 K/uL    Lymph # 0.8 (L) 1.0 - 4.8 K/uL    Mono # 0.3 0.3 - 1.0 K/uL    Eos # 0.0 0.0 - 0.5 K/uL    Baso # 0.00 0.00 - 0.20 K/uL    nRBC 0 0 /100 WBC    Gran % 82.7 (H) 38.0 - 73.0 %    Lymph % 11.4 (L) 18.0 - 48.0 %    Mono % 4.9 4.0 - 15.0 %    Eosinophil % 0.0 0.0 - 8.0 %    Basophil % 0.0 0.0 - 1.9 %    Differential Method Automated    Magnesium    Collection Time: 06/30/22  4:58 AM   Result Value Ref Range    Magnesium 1.8 1.6 - 2.6 mg/dL   Phosphorus    Collection Time: 06/30/22  4:58 AM   Result Value Ref  Range    Phosphorus 6.7 (H) 2.7 - 4.5 mg/dL   Comprehensive Metabolic Panel    Collection Time: 06/30/22  4:58 AM   Result Value Ref Range    Sodium 142 136 - 145 mmol/L    Potassium 3.6 3.5 - 5.1 mmol/L    Chloride 107 95 - 110 mmol/L    CO2 21 (L) 23 - 29 mmol/L    Glucose 100 70 - 110 mg/dL    BUN 35 (H) 8 - 23 mg/dL    Creatinine 6.3 (H) 0.5 - 1.4 mg/dL    Calcium 8.4 (L) 8.7 - 10.5 mg/dL    Total Protein 5.3 (L) 6.0 - 8.4 g/dL    Albumin 2.6 (L) 3.5 - 5.2 g/dL    Total Bilirubin 0.7 0.1 - 1.0 mg/dL    Alkaline Phosphatase 207 (H) 55 - 135 U/L    AST 12 10 - 40 U/L    ALT 17 10 - 44 U/L    Anion Gap 14 8 - 16 mmol/L    eGFR if African American 7 (A) >60 mL/min/1.73 m^2    eGFR if non African American 6 (A) >60 mL/min/1.73 m^2   Renal Function Panel    Collection Time: 06/30/22  4:58 AM   Result Value Ref Range    Glucose 100 70 - 110 mg/dL    Sodium 142 136 - 145 mmol/L    Potassium 3.6 3.5 - 5.1 mmol/L    Chloride 107 95 - 110 mmol/L    CO2 21 (L) 23 - 29 mmol/L    BUN 35 (H) 8 - 23 mg/dL    Calcium 8.4 (L) 8.7 - 10.5 mg/dL    Creatinine 6.3 (H) 0.5 - 1.4 mg/dL    Albumin 2.6 (L) 3.5 - 5.2 g/dL    Phosphorus 6.7 (H) 2.7 - 4.5 mg/dL    eGFR if African American 7 (A) >60 mL/min/1.73 m^2    eGFR if non African American 6 (A) >60 mL/min/1.73 m^2    Anion Gap 14 8 - 16 mmol/L   CBC Auto Differential    Collection Time: 06/30/22  4:58 AM   Result Value Ref Range    WBC 6.66 3.90 - 12.70 K/uL    RBC 2.69 (L) 4.00 - 5.40 M/uL    Hemoglobin 9.3 (L) 12.0 - 16.0 g/dL    Hematocrit 28.7 (L) 37.0 - 48.5 %     (H) 82 - 98 fL    MCH 34.6 (H) 27.0 - 31.0 pg    MCHC 32.4 32.0 - 36.0 g/dL    RDW 14.0 11.5 - 14.5 %    Platelets 145 (L) 150 - 450 K/uL    MPV 10.4 9.2 - 12.9 fL    Immature Granulocytes 0.9 (H) 0.0 - 0.5 %    Gran # (ANC) 5.5 1.8 - 7.7 K/uL    Immature Grans (Abs) 0.06 (H) 0.00 - 0.04 K/uL    Lymph # 0.7 (L) 1.0 - 4.8 K/uL    Mono # 0.3 0.3 - 1.0 K/uL    Eos # 0.0 0.0 - 0.5 K/uL    Baso # 0.00 0.00 - 0.20  K/uL    nRBC 0 0 /100 WBC    Gran % 83.1 (H) 38.0 - 73.0 %    Lymph % 11.0 (L) 18.0 - 48.0 %    Mono % 5.0 4.0 - 15.0 %    Eosinophil % 0.0 0.0 - 8.0 %    Basophil % 0.0 0.0 - 1.9 %    Differential Method Automated    Magnesium    Collection Time: 06/30/22  4:58 AM   Result Value Ref Range    Magnesium 1.8 1.6 - 2.6 mg/dL   Basic Metabolic Panel    Collection Time: 07/01/22  3:12 AM   Result Value Ref Range    Sodium 143 136 - 145 mmol/L    Potassium 3.5 3.5 - 5.1 mmol/L    Chloride 109 95 - 110 mmol/L    CO2 18 (L) 23 - 29 mmol/L    Glucose 96 70 - 110 mg/dL    BUN 43 (H) 8 - 23 mg/dL    Creatinine 7.0 (H) 0.5 - 1.4 mg/dL    Calcium 8.2 (L) 8.7 - 10.5 mg/dL    Anion Gap 16 8 - 16 mmol/L    eGFR if African American 6 (A) >60 mL/min/1.73 m^2    eGFR if non African American 5 (A) >60 mL/min/1.73 m^2   Magnesium    Collection Time: 07/01/22  3:12 AM   Result Value Ref Range    Magnesium 1.7 1.6 - 2.6 mg/dL   Phosphorus    Collection Time: 07/01/22  3:12 AM   Result Value Ref Range    Phosphorus 7.1 (H) 2.7 - 4.5 mg/dL         Assessment/Diagnosis:    1. Nephrolithiasis  Case Request Operating Room: REMOVAL, CALCULUS, URETER, URETEROSCOPIC    Place in Outpatient    Vital Signs     Diet NPO    Place sequential compression device    Basic metabolic panel    CBC auto differential    EKG 12-lead    Diet NPO    Urine culture   2. Kidney stone     3. Emphysematous pyelitis  Urine culture       Plans:    - I spent 30 minutes of the day of this encounter preparing for, treating and managing this patient. Discussed the etiology and management of the patient's nephrolithiasis. Explained that stone disease is multifactorial and can be secondary to urinary obstruction, urine composition, low urine volume, diet, hypokalemia, DM, hypertension, gout, RTA, UTI and medications. We discussed that stones can be managed with observation, trial of passage, ureteroscopy with laser lithotripsy, ESWL and PCNL. After extensive discussion,  patient has decided to proceed with right ureteroscopy with laser lithotripsy and stent exchange on 8/30/22. Phone preop. Urine culture 1 week prior.

## 2022-07-21 NOTE — ED PROVIDER NOTES
Encounter Date: 7/20/2022       History     Chief Complaint   Patient presents with    Leg Swelling     Pt states she has swelling in her R leg     This is a 72 year old female with history of diabetes, hypertension, hypothyroidism, prior history of vulvar cancer, end-stage renal disease on hemodialysis comes in complaining of right leg pain and swelling.  Patient reports that this started a few days ago.  Has been progressively getting worse.  She went to dialysis today and they recommended she come in for rule out DVT.  Patient reports no chest pain or shortness of breath.  She reports that symptoms have been moderate and constant.  She denies any exacerbating or alleviating factors otherwise.        Review of patient's allergies indicates:   Allergen Reactions    Ciprofloxacin Other (See Comments)     Elevated liver enzymes      Pcn [penicillins] Anaphylaxis     TOLERATES ROCEPHIN WITHOUT DIFFICULTY     Past Medical History:   Diagnosis Date    Cellulitis of trunk     Perineum, numerous episodes    Chronic diarrhea 02/10/2020    Short-gut syndrome    Diabetes mellitus, type 2     pt has not had for years after losing weight    End stage renal disease on dialysis 01/07/2018    Fatigue     Hypertension     Hypothyroidism 2/10/2020    Iron deficiency anemia due to chronic blood loss 1/7/2018    Pulmonary hypertension 3/20/2020    Vulvar cancer 1/7/2019     Past Surgical History:   Procedure Laterality Date    ABDOMINAL SURGERY      ANGIOGRAPHY OF ARTERIOVENOUS SHUNT N/A 1/19/2022    Procedure: Fistulogram with Possible Intervention;  Surgeon: Raz Maher MD;  Location: [facility] CATH/EP LAB;  Service: Cardiothoracic;  Laterality: N/A;    APPENDECTOMY      AV FISTULA PLACEMENT Left 2018    BACK SURGERY      BLADDER FULGURATION N/A 6/16/2020    Procedure: FULGURATION, BLADDER;  Surgeon: Damari Barber Jr., MD;  Location: [facility] OR;  Service: Urology;  Laterality: N/A;    carpel tunnel surgery once on  each hand      CERVICAL FUSION      x 4    cervix repair      CHOLECYSTECTOMY  2000    COLECTOMY      CYSTOSCOPY W/ RETROGRADES  6/16/2020    Procedure: CYSTOSCOPY, WITH RETROGRADE PYELOGRAM;  Surgeon: Damari Barber Jr., MD;  Location: [facility] OR;  Service: Urology;;    CYSTOSCOPY W/ RETROGRADES Right 4/20/2021    Procedure: CYSTOSCOPY, WITH RETROGRADE PYELOGRAM;  Surgeon: Damari Barber Jr., MD;  Location: [facility] OR;  Service: Urology;  Laterality: Right;    CYSTOSCOPY W/ URETERAL STENT PLACEMENT Right 3/16/2021    Procedure: CYSTOSCOPY, WITH URETERAL STENT INSERTION;  Surgeon: Damari Barber Jr., MD;  Location: [facility] OR;  Service: Urology;  Laterality: Right;    CYSTOSCOPY W/ URETERAL STENT PLACEMENT Right 5/18/2021    Procedure: CYSTOSCOPY, WITH URETERAL STENT INSERTION;  Surgeon: Damari Barber Jr., MD;  Location: [facility] OR;  Service: Urology;  Laterality: Right;    CYSTOSCOPY W/ URETERAL STENT PLACEMENT Right 6/22/2022    Procedure: CYSTOSCOPY, WITH URETERAL STENT INSERTION;  Surgeon: Gaby Garcia MD;  Location: [facility] OR;  Service: Urology;  Laterality: Right;  cysto stent exchange    CYSTOSCOPY W/ URETERAL STENT REMOVAL Right 4/20/2021    Procedure: CYSTOSCOPY, WITH URETERAL STENT REMOVAL;  Surgeon: Damari Barber Jr., MD;  Location: [facility] OR;  Service: Urology;  Laterality: Right;    CYSTOSCOPY WITH URETEROSCOPY, RETROGRADE PYELOGRAPHY, AND INSERTION OF STENT Right 3/30/2021    Procedure: CYSTOSCOPY, WITH RETROGRADE PYELOGRAM AND URETERAL STENT INSERTION;  Surgeon: Damari Barber Jr., MD;  Location: [facility] OR;  Service: Urology;  Laterality: Right;    CYSTOURETEROSCOPY WITH RETROGRADE PYELOGRAPHY AND INSERTION OF STENT INTO URETER Right 1/18/2022    Procedure: CYSTOURETEROSCOPY, WITH RETROGRADE PYELOGRAM AND URETERAL STENT INSERTION;  Surgeon: Damari Barber Jr., MD;  Location: [facility];  Service: Urology;  Laterality: Right;    DIAGNOSTIC LAPAROSCOPY N/A 2/13/2020    Procedure: LAPAROSCOPY,  DIAGNOSTIC;  Surgeon: Robbi Lovell III, MD;  Location: [facility] OR;  Service: General;  Laterality: N/A;    HYSTERECTOMY  1980    ILEOSTOMY N/A 2/13/2020    Procedure: CREATION, ILEOSTOMY Loop;  Surgeon: Robbi Lovell III, MD;  Location: [facility] OR;  Service: General;  Laterality: N/A;    ILEOSTOMY CLOSURE N/A 5/18/2020    Procedure: CLOSURE, ILEOSTOMY;  Surgeon: Robbi Lovell III, MD;  Location: [facility] OR;  Service: General;  Laterality: N/A;    LASER LITHOTRIPSY Right 3/30/2021    Procedure: LITHOTRIPSY, USING LASER;  Surgeon: Damari Barber Jr., MD;  Location: [facility] OR;  Service: Urology;  Laterality: Right;    LUMBAR LAMINECTOMY      LYSIS OF ADHESIONS N/A 2/13/2020    Procedure: LYSIS, ADHESIONS;  Surgeon: Robbi Lovell III, MD;  Location: [facility] OR;  Service: General;  Laterality: N/A;    NECK SURGERY      PERCUTANEOUS TRANSLUMINAL ANGIOPLASTY OF ARTERIOVENOUS FISTULA N/A 1/19/2022    Procedure: PTA, AV FISTULA;  Surgeon: Raz Maher MD;  Location: [facility] CATH/EP LAB;  Service: Cardiothoracic;  Laterality: N/A;    PHLEBOGRAPHY  2/28/2020    Procedure: VENOGRAM;  Surgeon: Robbi Lovell III, MD;  Location: [facility] OR;  Service: General;;    REMOVAL OF VASCULAR ACCESS PORT Right 2/13/2020    Procedure: REMOVAL, VASCULAR ACCESS PORT;  Surgeon: Robbi Lovell III, MD;  Location: [facility] OR;  Service: General;  Laterality: Right;    RETROGRADE PYELOGRAPHY  3/16/2021    Procedure: PYELOGRAM, RETROGRADE;  Surgeon: Damari Barber Jr., MD;  Location: [facility] OR;  Service: Urology;;    RETROGRADE PYELOGRAPHY Right 5/18/2021    Procedure: PYELOGRAM, RETROGRADE;  Surgeon: Damari Barber Jr., MD;  Location: [facility] OR;  Service: Urology;  Laterality: Right;    RETROGRADE PYELOGRAPHY  6/22/2022    Procedure: PYELOGRAM, RETROGRADE;  Surgeon: Gaby Garcia MD;  Location: [facility] OR;  Service: Urology;;    SHOULDER SURGERY      URETEROSCOPIC REMOVAL OF URETERIC CALCULUS Right 3/30/2021     Procedure: REMOVAL, CALCULUS, URETER, URETEROSCOPIC;  Surgeon: Damari Barber Jr., MD;  Location: [facility] OR;  Service: Urology;  Laterality: Right;    URETEROSCOPIC REMOVAL OF URETERIC CALCULUS Right 2021    Procedure: REMOVAL, CALCULUS, URETER, URETEROSCOPIC;  Surgeon: Damari Barber Jr., MD;  Location: [facility] OR;  Service: Urology;  Laterality: Right;    vulva cancer       Family History   Problem Relation Age of Onset    Heart disease Mother     Hypertension Mother     Cancer Mother         lung    Heart disease Father     Heart disease Sister         Open heart surgery    No Known Problems Daughter      Social History     Tobacco Use    Smoking status: Former Smoker     Packs/day: 1.00     Years: 33.00     Pack years: 33.00     Types: Cigarettes     Quit date: 2000     Years since quittin.5    Smokeless tobacco: Former User     Quit date:    Substance Use Topics    Alcohol use: No    Drug use: No     Review of Systems   Constitutional: Negative for chills and fever.   HENT: Negative for congestion, sore throat and trouble swallowing.    Respiratory: Negative for cough and shortness of breath.    Cardiovascular: Negative for chest pain and palpitations.   Gastrointestinal: Negative for abdominal pain, diarrhea, nausea and vomiting.   Genitourinary: Negative for dysuria and flank pain.   Musculoskeletal: Negative for back pain and neck pain.        Leg swelling as per HPI.     Neurological: Negative for weakness, numbness and headaches.   Psychiatric/Behavioral: Negative for agitation and confusion.   All other systems reviewed and are negative.      Physical Exam     Initial Vitals [22]   BP Pulse Resp Temp SpO2   137/69 92 18 98.9 °F (37.2 °C) --      MAP       --         Physical Exam    Nursing note and vitals reviewed.  Constitutional: Vital signs are normal. She appears well-developed and well-nourished.  Non-toxic appearance. No distress.   HENT:   Head: Normocephalic  and atraumatic.   Mouth/Throat: Oropharynx is clear and moist.   Eyes: Conjunctivae and EOM are normal. Pupils are equal, round, and reactive to light.   Neck: Neck supple.   Normal range of motion.  Cardiovascular: Normal rate, regular rhythm and intact distal pulses.   Pulmonary/Chest: Breath sounds normal.   Abdominal: Abdomen is soft. Bowel sounds are normal. There is no abdominal tenderness.   Musculoskeletal:         General: Normal range of motion.      Cervical back: Normal range of motion and neck supple. No rigidity. No muscular tenderness.      Comments: Right greater left lower extremity edema, no warmth or erythema, full range of motion, 2+ pulses.     Lymphadenopathy:     She has no cervical adenopathy.     She has no axillary adenopathy.   Neurological: She is alert and oriented to person, place, and time. She has normal strength. No cranial nerve deficit or sensory deficit. Gait normal.   Skin: Skin is warm, dry and intact.   Psychiatric: She has a normal mood and affect. Her behavior is normal.         ED Course   Procedures  Labs Reviewed   CBC W/ AUTO DIFFERENTIAL - Abnormal; Notable for the following components:       Result Value    RBC 2.76 (*)     Hemoglobin 9.3 (*)     Hematocrit 29.3 (*)      (*)     MCH 33.7 (*)     MCHC 31.7 (*)     RDW 14.9 (*)     All other components within normal limits   COMPREHENSIVE METABOLIC PANEL - Abnormal; Notable for the following components:    Potassium 3.2 (*)     Creatinine 3.9 (*)     Calcium 7.7 (*)     Albumin 2.8 (*)     eGFR if  12.5 (*)     eGFR if non  10.9 (*)     All other components within normal limits          Imaging Results          US Lower Extremity Veins Right (Final result)  Result time 07/20/22 21:50:18    Final result by Ryan Turk MD (07/20/22 21:50:18)                 Narrative:    EXAM DESCRIPTION:    US LOWER EXTREMITY VEINS RIGHT  [facility]: US LOWER EXTREMITY VEINS LIMITED FOLLOW-UP  UNILATERAL    CLINICAL HISTORY:    72 years  Female  swelling    COMPARISON:    None    TECHNIQUE:    Duplex venous Ultrasound of the lower extremity was obtained.    FINDINGS:    There is normal flow and compressibility seen within the deep venous structures from the common femoral vein down to the posterior tibial vein. There is no evidence for deep venous thrombosis.    Small to moderate amount of subcutaneous fluid/edema in the right calf.    IMPRESSION:  No evidence for DVT.    Electronically signed by:  Ryan Lazcano MD, MBA  7/20/2022 9:50 PM CDT Workstation: [id number]                               Medications   HYDROcodone-acetaminophen 5-325 mg per tablet 1 tablet (1 tablet Oral Given 7/20/22 9451)     Medical Decision Making:   Initial Assessment:   This is a 72-year-old female with history of end-stage renal disease on hemodialysis, diabetes, hypertension, hyperlipidemia, prior history of vulvar cancer comes in complaining of asymmetric leg swelling.  On examination her vitals are stable.  She is afebrile.  On physical exam she does have asymmetric swelling.  Exam is normal otherwise.    Orders included DVT study, CBC, CMP.  Differential Diagnosis:   Dependent edema, DVT, volume overload.  Independently Interpreted Test(s):   I have ordered and independently interpreted X-rays - see summary below.       <> Summary of X-Ray Reading(s): DVT study was reviewed and showed no evidence of DVT.  Clinical Tests:   Lab Tests: Ordered and Reviewed       <> Summary of Lab: Labs were reviewed and were significant for an elevated creatinine but she is on dialysis.  K was 3.2.  Was not repeated in light of her history of dialysis.  ED Management:  Patient's workup was unremarkable.  She was given pain medication in the ER.  On re-evaluation she denies any complaints.  She was discharged in good condition close outpatient follow-up.                      Clinical Impression:   Final diagnoses:  [M79.89] Leg  swelling          ED Disposition Condition    Discharge Stable        ED Prescriptions     None        Follow-up Information     Follow up With Specialties Details Why Contact Info    April Horton MD Internal Medicine In 2 days  [address] [facility] [address]  [phone]             Crystal Kay MD  07/20/22 5671

## 2022-08-21 NOTE — H&P (VIEW-ONLY)
SUBJECTIVE:    Patient ID: Shara Hutchins is a 72 y.o. female.    Chief Complaint: Edema (Right leg)    HPI     Follow-up meds-    Patient was seen in the ER for leg swelling-right greater than left-venous US was negative-last night she noted some discoloration (bluish) of the right lower leg-it is painful-    Was hospitalized for severe UTI and COVID-thinks she actually got COVID in the hospital--she was on iv antibiotics for a good while post hospitalization-is gong back for stent change for stones -this will be necessary every 6 months    She is waking up with more nausea lately, almost every day    Last labs-H/H 9.3/29.3 platelets 214,000 wbc 8.55 Na 137 K 3.2    Admission on 07/20/2022, Discharged on 07/20/2022   Component Date Value Ref Range Status    WBC 07/20/2022 8.55  3.90 - 12.70 K/uL Final    RBC 07/20/2022 2.76 (A) 4.00 - 5.40 M/uL Final    Hemoglobin 07/20/2022 9.3 (A) 12.0 - 16.0 g/dL Final    Hematocrit 07/20/2022 29.3 (A) 37.0 - 48.5 % Final    MCV 07/20/2022 106 (A) 82 - 98 fL Final    MCH 07/20/2022 33.7 (A) 27.0 - 31.0 pg Final    MCHC 07/20/2022 31.7 (A) 32.0 - 36.0 g/dL Final    RDW 07/20/2022 14.9 (A) 11.5 - 14.5 % Final    Platelets 07/20/2022 214  150 - 450 K/uL Final    MPV 07/20/2022 9.9  9.2 - 12.9 fL Final    Immature Granulocytes 07/20/2022 0.4  0.0 - 0.5 % Final    Gran # (ANC) 07/20/2022 6.1  1.8 - 7.7 K/uL Final    Immature Grans (Abs) 07/20/2022 0.03  0.00 - 0.04 K/uL Final    Lymph # 07/20/2022 1.7  1.0 - 4.8 K/uL Final    Mono # 07/20/2022 0.6  0.3 - 1.0 K/uL Final    Eos # 07/20/2022 0.1  0.0 - 0.5 K/uL Final    Baso # 07/20/2022 0.02  0.00 - 0.20 K/uL Final    nRBC 07/20/2022 0  0 /100 WBC Final    Gran % 07/20/2022 71.6  38.0 - 73.0 % Final    Lymph % 07/20/2022 19.3  18.0 - 48.0 % Final    Mono % 07/20/2022 6.9  4.0 - 15.0 % Final    Eosinophil % 07/20/2022 1.6  0.0 - 8.0 % Final    Basophil % 07/20/2022 0.2  0.0 - 1.9 % Final    Differential Method  07/20/2022 Automated   Final    Sodium 07/20/2022 137  136 - 145 mmol/L Final    Potassium 07/20/2022 3.2 (A) 3.5 - 5.1 mmol/L Final    Chloride 07/20/2022 97  95 - 110 mmol/L Final    CO2 07/20/2022 26  23 - 29 mmol/L Final    Glucose 07/20/2022 97  70 - 110 mg/dL Final    BUN 07/20/2022 8  8 - 23 mg/dL Final    Creatinine 07/20/2022 3.9 (A) 0.5 - 1.4 mg/dL Final    Calcium 07/20/2022 7.7 (A) 8.7 - 10.5 mg/dL Final    Total Protein 07/20/2022 6.1  6.0 - 8.4 g/dL Final    Albumin 07/20/2022 2.8 (A) 3.5 - 5.2 g/dL Final    Total Bilirubin 07/20/2022 0.6  0.1 - 1.0 mg/dL Final    Alkaline Phosphatase 07/20/2022 130  55 - 135 U/L Final    AST 07/20/2022 19  10 - 40 U/L Final    ALT 07/20/2022 10  10 - 44 U/L Final    Anion Gap 07/20/2022 14  8 - 16 mmol/L Final    eGFR if  07/20/2022 12.5 (A) >60 mL/min/1.73 m^2 Final    eGFR if non African American 07/20/2022 10.9 (A) >60 mL/min/1.73 m^2 Final   No results displayed because visit has over 200 results.      No results displayed because visit has over 200 results.      Admission on 06/16/2022, Discharged on 06/18/2022   Component Date Value Ref Range Status    SARS-CoV-2 RNA, Amplification, Qual 06/16/2022 Negative  Negative Final    WBC 06/16/2022 8.39  3.90 - 12.70 K/uL Final    RBC 06/16/2022 3.14 (A) 4.00 - 5.40 M/uL Final    Hemoglobin 06/16/2022 11.1 (A) 12.0 - 16.0 g/dL Final    Hematocrit 06/16/2022 34.1 (A) 37.0 - 48.5 % Final    MCV 06/16/2022 109 (A) 82 - 98 fL Final    MCH 06/16/2022 35.4 (A) 27.0 - 31.0 pg Final    MCHC 06/16/2022 32.6  32.0 - 36.0 g/dL Final    RDW 06/16/2022 13.2  11.5 - 14.5 % Final    Platelets 06/16/2022 200  150 - 450 K/uL Final    MPV 06/16/2022 10.5  9.2 - 12.9 fL Final    Immature Granulocytes 06/16/2022 0.6 (A) 0.0 - 0.5 % Final    Gran # (ANC) 06/16/2022 5.5  1.8 - 7.7 K/uL Final    Immature Grans (Abs) 06/16/2022 0.05 (A) 0.00 - 0.04 K/uL Final    Lymph # 06/16/2022 1.8  1.0 - 4.8  K/uL Final    Mono # 06/16/2022 0.9  0.3 - 1.0 K/uL Final    Eos # 06/16/2022 0.1  0.0 - 0.5 K/uL Final    Baso # 06/16/2022 0.03  0.00 - 0.20 K/uL Final    nRBC 06/16/2022 0  0 /100 WBC Final    Gran % 06/16/2022 65.6  38.0 - 73.0 % Final    Lymph % 06/16/2022 20.9  18.0 - 48.0 % Final    Mono % 06/16/2022 11.1  4.0 - 15.0 % Final    Eosinophil % 06/16/2022 1.4  0.0 - 8.0 % Final    Basophil % 06/16/2022 0.4  0.0 - 1.9 % Final    Differential Method 06/16/2022 Automated   Final    Sodium 06/16/2022 137  136 - 145 mmol/L Final    Potassium 06/16/2022 3.5  3.5 - 5.1 mmol/L Final    Chloride 06/16/2022 103  95 - 110 mmol/L Final    CO2 06/16/2022 23  23 - 29 mmol/L Final    Glucose 06/16/2022 85  70 - 110 mg/dL Final    BUN 06/16/2022 19  8 - 23 mg/dL Final    Creatinine 06/16/2022 4.3 (A) 0.5 - 1.4 mg/dL Final    Calcium 06/16/2022 8.3 (A) 8.7 - 10.5 mg/dL Final    Total Protein 06/16/2022 6.3  6.0 - 8.4 g/dL Final    Albumin 06/16/2022 3.4 (A) 3.5 - 5.2 g/dL Final    Total Bilirubin 06/16/2022 0.4  0.1 - 1.0 mg/dL Final    Alkaline Phosphatase 06/16/2022 110  55 - 135 U/L Final    AST 06/16/2022 12  10 - 40 U/L Final    ALT 06/16/2022 9 (A) 10 - 44 U/L Final    Anion Gap 06/16/2022 11  8 - 16 mmol/L Final    eGFR if  06/16/2022 11.2 (A) >60 mL/min/1.73 m^2 Final    eGFR if non  06/16/2022 9.7 (A) >60 mL/min/1.73 m^2 Final    Troponin I 06/16/2022 <0.030  <=0.040 ng/mL Final    BNP 06/16/2022 229 (A) 0 - 99 pg/mL Final    Influenza A, Molecular 06/16/2022 Negative  Negative Final    Influenza B, Molecular 06/16/2022 Negative  Negative Final    Flu A & B Source 06/16/2022 Nasal swab   Final    Troponin I 06/16/2022 <0.030  <=0.040 ng/mL Final    Magnesium 06/16/2022 1.5 (A) 1.6 - 2.6 mg/dL Final    TSH 06/16/2022 7.000 (A) 0.340 - 5.600 uIU/mL Final    Troponin I 06/17/2022 <0.030  <=0.040 ng/mL Final    BSA 06/17/2022 1.78  m2 Final    TDI SEPTAL  06/17/2022 0.10  m/s Final    LV LATERAL E/E' RATIO 06/17/2022 9.50  m/s Final    LV SEPTAL E/E' RATIO 06/17/2022 11.40  m/s Final    TDI LATERAL 06/17/2022 0.12  m/s Final    LVIDd 06/17/2022 4.81  3.5 - 6.0 cm Final    LVIDs 06/17/2022 2.90  2.1 - 4.0 cm Final    FS 06/17/2022 40  28 - 44 % Final    LA size 06/17/2022 3.85  cm Final    RVDD 06/17/2022 263.00  cm Final    TAPSE 06/17/2022 220.00  cm Final    AV mean gradient 06/17/2022 11  mmHg Final    AV valve area 06/17/2022 2.87  cm2 Final    AV index (prosthetic) 06/17/2022 0.90   Final    E/A ratio 06/17/2022 0.80   Final    Mean e' 06/17/2022 0.11  m/s Final    E wave deceleration time 06/17/2022 393.67  msec Final    LVOT diameter 06/17/2022 2.01  cm Final    LVOT area 06/17/2022 3.2  cm2 Final    LVOT peak durga 06/17/2022 173.07  m/s Final    LVOT peak VTI 06/17/2022 39.78  cm Final    Ao VTI 06/17/2022 43.97  cm Final    LVOT stroke volume 06/17/2022 126.16  cm3 Final    E/E' ratio 06/17/2022 10.36  m/s Final    MV Peak E Durga 06/17/2022 1.14  m/s Final    TR Max Durga 06/17/2022 2.00  m/s Final    MV Peak A Durga 06/17/2022 1.43  m/s Final    LV Systolic Volume 06/17/2022 24.47  mL Final    LV Systolic Volume Index 06/17/2022 13.8  mL/m2 Final    LV Diastolic Volume 06/17/2022 111.61  mL Final    LV Diastolic Volume Index 06/17/2022 63.06  mL/m2 Final    Triscuspid Valve Regurgitation Pea* 06/17/2022 16  mmHg Final    Right Atrial Pressure (from IVC) 06/17/2022 3  mmHg Final    EF 06/17/2022 70  % Final    TV rest pulmonary artery pressure 06/17/2022 19  mmHg Final    Free T4 06/16/2022 0.74  0.71 - 1.51 ng/dL Final    Sodium 06/17/2022 137  136 - 145 mmol/L Final    Potassium 06/17/2022 3.8  3.5 - 5.1 mmol/L Final    Chloride 06/17/2022 103  95 - 110 mmol/L Final    CO2 06/17/2022 25  23 - 29 mmol/L Final    Glucose 06/17/2022 105  70 - 110 mg/dL Final    BUN 06/17/2022 26 (A) 8 - 23 mg/dL Final    Creatinine  06/17/2022 4.8 (A) 0.5 - 1.4 mg/dL Final    Calcium 06/17/2022 8.6 (A) 8.7 - 10.5 mg/dL Final    Anion Gap 06/17/2022 9  8 - 16 mmol/L Final    eGFR if  06/17/2022 9.8 (A) >60 mL/min/1.73 m^2 Final    eGFR if non African American 06/17/2022 8.5 (A) >60 mL/min/1.73 m^2 Final    Magnesium 06/17/2022 2.0  1.6 - 2.6 mg/dL Final    Phosphorus 06/17/2022 4.8 (A) 2.7 - 4.5 mg/dL Final    Troponin I 06/17/2022 <0.030  <=0.040 ng/mL Final    WBC 06/17/2022 5.28  3.90 - 12.70 K/uL Final    RBC 06/17/2022 3.12 (A) 4.00 - 5.40 M/uL Final    Hemoglobin 06/17/2022 11.1 (A) 12.0 - 16.0 g/dL Final    Hematocrit 06/17/2022 34.3 (A) 37.0 - 48.5 % Final    MCV 06/17/2022 110 (A) 82 - 98 fL Final    MCH 06/17/2022 35.6 (A) 27.0 - 31.0 pg Final    MCHC 06/17/2022 32.4  32.0 - 36.0 g/dL Final    RDW 06/17/2022 13.0  11.5 - 14.5 % Final    Platelets 06/17/2022 177  150 - 450 K/uL Final    MPV 06/17/2022 10.5  9.2 - 12.9 fL Final    Immature Granulocytes 06/17/2022 0.8 (A) 0.0 - 0.5 % Final    Gran # (ANC) 06/17/2022 4.6  1.8 - 7.7 K/uL Final    Immature Grans (Abs) 06/17/2022 0.04  0.00 - 0.04 K/uL Final    Lymph # 06/17/2022 0.5 (A) 1.0 - 4.8 K/uL Final    Mono # 06/17/2022 0.1 (A) 0.3 - 1.0 K/uL Final    Eos # 06/17/2022 0.0  0.0 - 0.5 K/uL Final    Baso # 06/17/2022 0.01  0.00 - 0.20 K/uL Final    nRBC 06/17/2022 0  0 /100 WBC Final    Gran % 06/17/2022 87.1 (A) 38.0 - 73.0 % Final    Lymph % 06/17/2022 10.2 (A) 18.0 - 48.0 % Final    Mono % 06/17/2022 1.5 (A) 4.0 - 15.0 % Final    Eosinophil % 06/17/2022 0.2  0.0 - 8.0 % Final    Basophil % 06/17/2022 0.2  0.0 - 1.9 % Final    Differential Method 06/17/2022 Automated   Final    Vitamin B-12 06/17/2022 507  210 - 950 pg/mL Final    Folate 06/17/2022 13.4  4.0 - 24.0 ng/mL Final    Sodium 06/18/2022 138  136 - 145 mmol/L Final    Potassium 06/18/2022 4.0  3.5 - 5.1 mmol/L Final    Chloride 06/18/2022 102  95 - 110 mmol/L Final    CO2  06/18/2022 27  23 - 29 mmol/L Final    Glucose 06/18/2022 128 (A) 70 - 110 mg/dL Final    BUN 06/18/2022 23  8 - 23 mg/dL Final    Creatinine 06/18/2022 3.9 (A) 0.5 - 1.4 mg/dL Final    Calcium 06/18/2022 8.5 (A) 8.7 - 10.5 mg/dL Final    Anion Gap 06/18/2022 9  8 - 16 mmol/L Final    eGFR if  06/18/2022 12.5 (A) >60 mL/min/1.73 m^2 Final    eGFR if non African American 06/18/2022 10.9 (A) >60 mL/min/1.73 m^2 Final    Magnesium 06/18/2022 2.0  1.6 - 2.6 mg/dL Final    Phosphorus 06/18/2022 4.0  2.7 - 4.5 mg/dL Final    WBC 06/18/2022 6.55  3.90 - 12.70 K/uL Final    RBC 06/18/2022 2.94 (A) 4.00 - 5.40 M/uL Final    Hemoglobin 06/18/2022 10.5 (A) 12.0 - 16.0 g/dL Final    Hematocrit 06/18/2022 32.7 (A) 37.0 - 48.5 % Final    MCV 06/18/2022 111 (A) 82 - 98 fL Final    MCH 06/18/2022 35.7 (A) 27.0 - 31.0 pg Final    MCHC 06/18/2022 32.1  32.0 - 36.0 g/dL Final    RDW 06/18/2022 12.9  11.5 - 14.5 % Final    Platelets 06/18/2022 214  150 - 450 K/uL Final    MPV 06/18/2022 10.2  9.2 - 12.9 fL Final    Immature Granulocytes 06/18/2022 0.6 (A) 0.0 - 0.5 % Final    Gran # (ANC) 06/18/2022 5.9  1.8 - 7.7 K/uL Final    Immature Grans (Abs) 06/18/2022 0.04  0.00 - 0.04 K/uL Final    Lymph # 06/18/2022 0.4 (A) 1.0 - 4.8 K/uL Final    Mono # 06/18/2022 0.2 (A) 0.3 - 1.0 K/uL Final    Eos # 06/18/2022 0.0  0.0 - 0.5 K/uL Final    Baso # 06/18/2022 0.00  0.00 - 0.20 K/uL Final    nRBC 06/18/2022 0  0 /100 WBC Final    Gran % 06/18/2022 90.4 (A) 38.0 - 73.0 % Final    Lymph % 06/18/2022 6.4 (A) 18.0 - 48.0 % Final    Mono % 06/18/2022 2.6 (A) 4.0 - 15.0 % Final    Eosinophil % 06/18/2022 0.0  0.0 - 8.0 % Final    Basophil % 06/18/2022 0.0  0.0 - 1.9 % Final    Differential Method 06/18/2022 Automated   Final   No results displayed because visit has over 200 results.      No results displayed because visit has over 200 results.      Admission on 09/15/2021, Discharged on 09/16/2021    Component Date Value Ref Range Status    WBC 09/15/2021 6.71  3.90 - 12.70 K/uL Final    RBC 09/15/2021 2.31 (A) 4.00 - 5.40 M/uL Final    Hemoglobin 09/15/2021 8.4 (A) 12.0 - 16.0 g/dL Final    Hematocrit 09/15/2021 26.0 (A) 37.0 - 48.5 % Final    MCV 09/15/2021 113 (A) 82 - 98 fL Final    MCH 09/15/2021 36.4 (A) 27.0 - 31.0 pg Final    MCHC 09/15/2021 32.3  32.0 - 36.0 g/dL Final    RDW 09/15/2021 16.1 (A) 11.5 - 14.5 % Final    Platelets 09/15/2021 179  150 - 450 K/uL Final    MPV 09/15/2021 10.0  9.2 - 12.9 fL Final    Immature Granulocytes 09/15/2021 0.3  0.0 - 0.5 % Final    Gran # (ANC) 09/15/2021 5.2  1.8 - 7.7 K/uL Final    Immature Grans (Abs) 09/15/2021 0.02  0.00 - 0.04 K/uL Final    Lymph # 09/15/2021 0.8 (A) 1.0 - 4.8 K/uL Final    Mono # 09/15/2021 0.5  0.3 - 1.0 K/uL Final    Eos # 09/15/2021 0.1  0.0 - 0.5 K/uL Final    Baso # 09/15/2021 0.01  0.00 - 0.20 K/uL Final    nRBC 09/15/2021 0  0 /100 WBC Final    Gran % 09/15/2021 78.0 (A) 38.0 - 73.0 % Final    Lymph % 09/15/2021 12.5 (A) 18.0 - 48.0 % Final    Mono % 09/15/2021 7.5  4.0 - 15.0 % Final    Eosinophil % 09/15/2021 1.6  0.0 - 8.0 % Final    Basophil % 09/15/2021 0.1  0.0 - 1.9 % Final    Differential Method 09/15/2021 Automated   Final    Sodium 09/15/2021 141  136 - 145 mmol/L Final    Potassium 09/15/2021 3.0 (A) 3.5 - 5.1 mmol/L Final    Chloride 09/15/2021 99  95 - 110 mmol/L Final    CO2 09/15/2021 28  23 - 29 mmol/L Final    Glucose 09/15/2021 123 (A) 70 - 110 mg/dL Final    BUN 09/15/2021 13  8 - 23 mg/dL Final    Creatinine 09/15/2021 2.6 (A) 0.5 - 1.4 mg/dL Final    Calcium 09/15/2021 8.2 (A) 8.7 - 10.5 mg/dL Final    Total Protein 09/15/2021 6.2  6.0 - 8.4 g/dL Final    Albumin 09/15/2021 3.2 (A) 3.5 - 5.2 g/dL Final    Total Bilirubin 09/15/2021 0.7  0.1 - 1.0 mg/dL Final    Alkaline Phosphatase 09/15/2021 99  55 - 135 U/L Final    AST 09/15/2021 15  10 - 40 U/L Final    ALT 09/15/2021 8  (A) 10 - 44 U/L Final    Anion Gap 09/15/2021 14  8 - 16 mmol/L Final    eGFR if  09/15/2021 20.6 (A) >60 mL/min/1.73 m^2 Final    eGFR if non African American 09/15/2021 17.9 (A) >60 mL/min/1.73 m^2 Final    Magnesium 09/15/2021 1.4 (A) 1.6 - 2.6 mg/dL Final    aPTT 09/15/2021 30.6  25.6 - 35.8 sec Final    PT 09/15/2021 16.2 (A) 11.8 - 14.3 sec Final    INR 09/15/2021 1.4   Final    Group & Rh 09/15/2021 A POS   Final    Indirect Bruno 09/15/2021 NEG   Final    SARS-CoV-2 RNA, Amplification, Qual 09/15/2021 Negative  Negative Final    UNIT NUMBER 09/15/2021 [id number]   Final    Product Code 09/15/2021 [id number]   Final    DISPENSE STATUS 09/15/2021 TRANSFUSED   Final    CODING SYSTEM 09/15/2021 [id number]   Final    Unit Blood Type Code 09/15/2021 6200   Final    Unit Blood Type 09/15/2021 A POS   Final    Unit Expiration 09/15/2021 [id number]   Final    Troponin I 09/15/2021 <0.030  <=0.040 ng/mL Final    Troponin I 09/16/2021 <0.030  <=0.040 ng/mL Final    WBC 09/16/2021 6.09  3.90 - 12.70 K/uL Final    RBC 09/16/2021 2.48 (A) 4.00 - 5.40 M/uL Final    Hemoglobin 09/16/2021 9.0 (A) 12.0 - 16.0 g/dL Final    Hematocrit 09/16/2021 28.2 (A) 37.0 - 48.5 % Final    MCV 09/16/2021 114 (A) 82 - 98 fL Final    MCH 09/16/2021 36.3 (A) 27.0 - 31.0 pg Final    MCHC 09/16/2021 31.9 (A) 32.0 - 36.0 g/dL Final    RDW 09/16/2021 18.1 (A) 11.5 - 14.5 % Final    Platelets 09/16/2021 158  150 - 450 K/uL Final    MPV 09/16/2021 9.9  9.2 - 12.9 fL Final    Immature Granulocytes 09/16/2021 0.5  0.0 - 0.5 % Final    Gran # (ANC) 09/16/2021 4.4  1.8 - 7.7 K/uL Final    Immature Grans (Abs) 09/16/2021 0.03  0.00 - 0.04 K/uL Final    Lymph # 09/16/2021 1.1  1.0 - 4.8 K/uL Final    Mono # 09/16/2021 0.5  0.3 - 1.0 K/uL Final    Eos # 09/16/2021 0.1  0.0 - 0.5 K/uL Final    Baso # 09/16/2021 0.02  0.00 - 0.20 K/uL Final    nRBC 09/16/2021 0  0 /100 WBC Final    Gran % 09/16/2021  71.5  38.0 - 73.0 % Final    Lymph % 09/16/2021 17.2 (A) 18.0 - 48.0 % Final    Mono % 09/16/2021 8.5  4.0 - 15.0 % Final    Eosinophil % 09/16/2021 2.0  0.0 - 8.0 % Final    Basophil % 09/16/2021 0.3  0.0 - 1.9 % Final    Differential Method 09/16/2021 Automated   Final    Sodium 09/16/2021 141  136 - 145 mmol/L Final    Potassium 09/16/2021 3.8  3.5 - 5.1 mmol/L Final    Chloride 09/16/2021 100  95 - 110 mmol/L Final    CO2 09/16/2021 30 (A) 23 - 29 mmol/L Final    Glucose 09/16/2021 78  70 - 110 mg/dL Final    BUN 09/16/2021 22  8 - 23 mg/dL Final    Creatinine 09/16/2021 3.6 (A) 0.5 - 1.4 mg/dL Final    Calcium 09/16/2021 8.0 (A) 8.7 - 10.5 mg/dL Final    Total Protein 09/16/2021 5.8 (A) 6.0 - 8.4 g/dL Final    Albumin 09/16/2021 3.0 (A) 3.5 - 5.2 g/dL Final    Total Bilirubin 09/16/2021 0.7  0.1 - 1.0 mg/dL Final    Alkaline Phosphatase 09/16/2021 96  55 - 135 U/L Final    AST 09/16/2021 11  10 - 40 U/L Final    ALT 09/16/2021 9 (A) 10 - 44 U/L Final    Anion Gap 09/16/2021 11  8 - 16 mmol/L Final    eGFR if  09/16/2021 13.9 (A) >60 mL/min/1.73 m^2 Final    eGFR if non  09/16/2021 12.1 (A) >60 mL/min/1.73 m^2 Final       Past Medical History:   Diagnosis Date    Cellulitis of trunk     Perineum, numerous episodes    Chronic diarrhea 02/10/2020    Short-gut syndrome    Diabetes mellitus, type 2     pt has not had for years after losing weight    End stage renal disease on dialysis 01/07/2018    Fatigue     Hypertension     Hypothyroidism 2/10/2020    Iron deficiency anemia due to chronic blood loss 1/7/2018    Pulmonary hypertension 3/20/2020    Vulvar cancer 1/7/2019     Past Surgical History:   Procedure Laterality Date    ABDOMINAL SURGERY      ANGIOGRAPHY OF ARTERIOVENOUS SHUNT N/A 1/19/2022    Procedure: Fistulogram with Possible Intervention;  Surgeon: Raz Maher MD;  Location: [facility] CATH/EP LAB;  Service: Cardiothoracic;  Laterality:  N/A;    APPENDECTOMY      AV FISTULA PLACEMENT Left 2018    BACK SURGERY      BLADDER FULGURATION N/A 6/16/2020    Procedure: FULGURATION, BLADDER;  Surgeon: Damari Barber Jr., MD;  [facility];  Service: Urology;  Laterality: N/A;    carpel tunnel surgery once on each hand      CERVICAL FUSION      x 4    cervix repair      CHOLECYSTECTOMY  2000    COLECTOMY      CYSTOSCOPY W/ RETROGRADES  6/16/2020    Procedure: CYSTOSCOPY, WITH RETROGRADE PYELOGRAM;  Surgeon: Damari Barber Jr., MD;  Location: [facility] OR;  Service: Urology;;    CYSTOSCOPY W/ RETROGRADES Right 4/20/2021    Procedure: CYSTOSCOPY, WITH RETROGRADE PYELOGRAM;  Surgeon: Damari Barber Jr., MD;  Location: [facility] OR;  Service: Urology;  Laterality: Right;    CYSTOSCOPY W/ URETERAL STENT PLACEMENT Right 3/16/2021    Procedure: CYSTOSCOPY, WITH URETERAL STENT INSERTION;  Surgeon: Damari Barber Jr., MD;  Location: [facility] OR;  Service: Urology;  Laterality: Right;    CYSTOSCOPY W/ URETERAL STENT PLACEMENT Right 5/18/2021    Procedure: CYSTOSCOPY, WITH URETERAL STENT INSERTION;  Surgeon: Damari Barber Jr., MD;  Location: [facility] OR;  Service: Urology;  Laterality: Right;    CYSTOSCOPY W/ URETERAL STENT PLACEMENT Right 6/22/2022    Procedure: CYSTOSCOPY, WITH URETERAL STENT INSERTION;  Surgeon: Gaby Garcia MD;  Location: [facility] OR;  Service: Urology;  Laterality: Right;  cysto stent exchange    CYSTOSCOPY W/ URETERAL STENT REMOVAL Right 4/20/2021    Procedure: CYSTOSCOPY, WITH URETERAL STENT REMOVAL;  Surgeon: Damari Barber Jr., MD;  Location: [facility] OR;  Service: Urology;  Laterality: Right;    CYSTOSCOPY WITH URETEROSCOPY, RETROGRADE PYELOGRAPHY, AND INSERTION OF STENT Right 3/30/2021    Procedure: CYSTOSCOPY, WITH RETROGRADE PYELOGRAM AND URETERAL STENT INSERTION;  Surgeon: Damari Barber Jr., MD;  Location: [facility] OR;  Service: Urology;  Laterality: Right;    CYSTOURETEROSCOPY WITH RETROGRADE PYELOGRAPHY AND INSERTION OF STENT INTO  URETER Right 1/18/2022    Procedure: CYSTOURETEROSCOPY, WITH RETROGRADE PYELOGRAM AND URETERAL STENT INSERTION;  Surgeon: Damari Barber Jr., MD;  Location: [facility] OR;  Service: Urology;  Laterality: Right;    DIAGNOSTIC LAPAROSCOPY N/A 2/13/2020    Procedure: LAPAROSCOPY, DIAGNOSTIC;  Surgeon: Robbi Lovell III, MD;  Location: [facility] OR;  Service: General;  Laterality: N/A;    HYSTERECTOMY  1980    ILEOSTOMY N/A 2/13/2020    Procedure: CREATION, ILEOSTOMY Loop;  Surgeon: Robbi Lovell III, MD;  Location: [facility] OR;  Service: General;  Laterality: N/A;    ILEOSTOMY CLOSURE N/A 5/18/2020    Procedure: CLOSURE, ILEOSTOMY;  Surgeon: Robbi Lovell III, MD;  Location: [facility] OR;  Service: General;  Laterality: N/A;    LASER LITHOTRIPSY Right 3/30/2021    Procedure: LITHOTRIPSY, USING LASER;  Surgeon: Damari Barber Jr., MD;  Location: [facility] OR;  Service: Urology;  Laterality: Right;    LUMBAR LAMINECTOMY      LYSIS OF ADHESIONS N/A 2/13/2020    Procedure: LYSIS, ADHESIONS;  Surgeon: Robbi Lovell III, MD;  Location: [facility] OR;  Service: General;  Laterality: N/A;    NECK SURGERY      PERCUTANEOUS TRANSLUMINAL ANGIOPLASTY OF ARTERIOVENOUS FISTULA N/A 1/19/2022    Procedure: PTA, AV FISTULA;  Surgeon: Raz Maher MD;  Location: [facility] CATH/EP LAB;  Service: Cardiothoracic;  Laterality: N/A;    PHLEBOGRAPHY  2/28/2020    Procedure: VENOGRAM;  Surgeon: Robbi Lovell III, MD;  Location: [facility] OR;  Service: General;;    REMOVAL OF VASCULAR ACCESS PORT Right 2/13/2020    Procedure: REMOVAL, VASCULAR ACCESS PORT;  Surgeon: Robbi Lovell III, MD;  Location: [facility] OR;  Service: General;  Laterality: Right;    RETROGRADE PYELOGRAPHY  3/16/2021    Procedure: PYELOGRAM, RETROGRADE;  Surgeon: Damari Barber Jr., MD;  Location: [facility] OR;  Service: Urology;;    RETROGRADE PYELOGRAPHY Right 5/18/2021    Procedure: PYELOGRAM, RETROGRADE;  Surgeon: Damari Barber Jr., MD;  Location: [facility] OR;  Service:  Urology;  Laterality: Right;    RETROGRADE PYELOGRAPHY  6/22/2022    Procedure: PYELOGRAM, RETROGRADE;  Surgeon: Gaby Garcia MD;  Location: [facility] OR;  Service: Urology;;    SHOULDER SURGERY      URETEROSCOPIC REMOVAL OF URETERIC CALCULUS Right 3/30/2021    Procedure: REMOVAL, CALCULUS, URETER, URETEROSCOPIC;  Surgeon: Damari Barber Jr., MD;  Location: [facility] OR;  Service: Urology;  Laterality: Right;    URETEROSCOPIC REMOVAL OF URETERIC CALCULUS Right 4/20/2021    Procedure: REMOVAL, CALCULUS, URETER, URETEROSCOPIC;  Surgeon: Damari Barber Jr., MD;  Location: [facility] OR;  Service: Urology;  Laterality: Right;    vulva cancer       Family History   Problem Relation Age of Onset    Heart disease Mother     Hypertension Mother     Cancer Mother         lung    Heart disease Father     Heart disease Sister         Open heart surgery    No Known Problems Daughter        Marital Status:   Alcohol History:  reports no history of alcohol use.  Tobacco History:  reports that she quit smoking about 22 years ago. Her smoking use included cigarettes. She has a 33.00 pack-year smoking history. She quit smokeless tobacco use about 22 years ago.  Drug History:  reports no history of drug use.    Review of patient's allergies indicates:   Allergen Reactions    Ciprofloxacin Other (See Comments)     Elevated liver enzymes      Pcn [penicillins] Anaphylaxis     TOLERATES ROCEPHIN WITHOUT DIFFICULTY       Current Outpatient Medications:     cholecalciferol, vitamin D3, (VITAMIN D3) 25 mcg (1,000 unit) capsule, Take 1 capsule (1,000 Units total) by mouth once daily., Disp: 10 capsule, Rfl: 0    diltiaZEM (CARDIZEM CD) 120 MG Cp24, Take 1 capsule (120 mg total) by mouth once daily., Disp: 90 capsule, Rfl: 3    ferric citrate (AURYXIA) 210 mg iron Tab, Take 2 tablets by mouth 3 (three) times daily with meals., Disp: , Rfl:     gabapentin (NEURONTIN) 100 MG capsule, Take 100 mg by mouth 2 (two) times  daily. HCS, Disp: , Rfl:     hydrALAZINE (APRESOLINE) 50 MG tablet, Take 1 tablet (50 mg total) by mouth every 8 (eight) hours., Disp: 270 tablet, Rfl: 0    levothyroxine (SYNTHROID) 88 MCG tablet, TAKE 1 TABLET BEFORE BREAKFAST (Patient taking differently: Take 88 mcg by mouth before breakfast. TAKE 1 TABLET BEFORE BREAKFAST), Disp: 90 tablet, Rfl: 3    LORazepam (ATIVAN) 0.5 MG tablet, Take 1 tablet (0.5 mg total) by mouth every evening., Disp: 90 tablet, Rfl: 1    rOPINIRole (REQUIP) 0.5 MG tablet, Take 0.5 mg by mouth every 8 (eight) hours., Disp: , Rfl:     sertraline (ZOLOFT) 50 MG tablet, Take 1 tablet (50 mg total) by mouth every evening. Take 50 mg by mouth every evening., Disp: 90 tablet, Rfl: 1    traMADoL (ULTRAM) 50 mg tablet, Take 1 tablet (50 mg total) by mouth every 6 (six) hours as needed., Disp: 30 tablet, Rfl: 0    zinc gluconate 50 mg tablet, Take 50 mg by mouth once daily., Disp: , Rfl:     meropenem (MERREM) 500 mg injection, Inject into the vein., Disp: , Rfl:     meropenem-0.9% sodium chloride 500 mg/50 mL PgBk, Inject 50 mLs (500 mg total) into the vein every evening. (Patient not taking: Reported on 8/22/2022), Disp: , Rfl:     promethazine (PHENERGAN) 25 MG tablet, Take 1 tablet (25 mg total) by mouth 2 (two) times daily as needed for Nausea., Disp: 60 tablet, Rfl: 0  No current facility-administered medications for this visit.    Facility-Administered Medications Ordered in Other Visits:     electrolyte-S (ISOLYTE), , Intravenous, Continuous, Raz Ruff MD    lactated ringers infusion, , Intravenous, Continuous, Ruy Beltre MD    LIDOcaine (PF) 10 mg/ml (1%) injection 5 mg, 0.5 mL, Intradermal, Once, Raz Ruff MD    Review of Systems   Constitutional: Negative for appetite change, chills, diaphoresis, fatigue, fever and unexpected weight change.   HENT: Positive for voice change (post COVID). Negative for congestion, ear pain, hearing loss, nosebleeds,  postnasal drip, sinus pressure, sinus pain, sneezing, sore throat, tinnitus and trouble swallowing.    Eyes: Negative for photophobia, pain, itching and visual disturbance.   Respiratory: Positive for cough (post COVID). Negative for apnea, chest tightness, shortness of breath, wheezing and stridor.    Cardiovascular: Negative for chest pain, palpitations and leg swelling.   Gastrointestinal: Positive for abdominal pain (stones) and diarrhea (has slowed with new probiotic). Negative for abdominal distention, blood in stool, constipation, nausea and vomiting.   Endocrine: Negative for cold intolerance, heat intolerance, polydipsia and polyuria.   Genitourinary: Negative for difficulty urinating, dyspareunia, dysuria, flank pain, frequency, hematuria, menstrual problem, pelvic pain, urgency, vaginal discharge and vaginal pain.        HPI   Musculoskeletal: Positive for back pain (right lower back). Negative for arthralgias, joint swelling, myalgias, neck pain and neck stiffness.   Skin: Negative for pallor.   Allergic/Immunologic: Negative for environmental allergies and food allergies.   Neurological: Positive for tremors. Negative for dizziness, speech difficulty, weakness, light-headedness and numbness.   Hematological: Does not bruise/bleed easily.   Psychiatric/Behavioral: Negative for agitation, confusion, decreased concentration, sleep disturbance and suicidal ideas. The patient is not nervous/anxious.           Objective:      Vitals    Vitals - 1 value per visit 7/8/2022 7/20/2022 7/20/2022 8/22/2022 8/22/2022   SYSTOLIC 114 137 144 - 120   DIASTOLIC 52 69 65 - 58   Pulse 85 92 78 - 94   Temp - 98.9 - - 98.1   Resp - 18 17 - 14   SPO2 - - 95 - 98   Weight (lb) 148.7 142 - - 149   Weight (kg) 67.45 64.411 - - 67.586   Height 67 67 - - 67   BMI (Calculated) 23.3 22.2 - - 23.3   VISIT REPORT - - - - -   Pain Score  - - - 6 -   Some recent data might be hidden       Physical Exam  Vitals and nursing note  reviewed.   Constitutional:       General: She is not in acute distress.     Appearance: She is not ill-appearing, toxic-appearing or diaphoretic.   HENT:      Head: Normocephalic and atraumatic.   Eyes:      Extraocular Movements: Extraocular movements intact.      Pupils: Pupils are equal, round, and reactive to light.   Neck:      Vascular: No carotid bruit.   Cardiovascular:      Rate and Rhythm: Normal rate and regular rhythm.      Heart sounds: Murmur (2/6) heard.   Pulmonary:      Effort: Pulmonary effort is normal.      Breath sounds: Normal breath sounds.   Abdominal:      General: Bowel sounds are normal.      Palpations: Abdomen is soft.   Musculoskeletal:         General: Swelling (tight teri right greater thn left) and tenderness (right lower extremity) present.      Cervical back: Normal range of motion and neck supple.   Lymphadenopathy:      Cervical: No cervical adenopathy.   Skin:     General: Skin is warm and dry.      Capillary Refill: Capillary refill takes less than 2 seconds.   Neurological:      General: No focal deficit present.      Mental Status: She is alert and oriented to person, place, and time.   Psychiatric:         Mood and Affect: Mood normal.         Thought Content: Thought content normal.         Judgment: Judgment normal.           Assessment:       1. Right leg swelling    2. Nausea    3. Hyperlipidemia, unspecified hyperlipidemia type    4. 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 Nausea and vomiting, intractability of vomiting not specified, unspecified vomiting type         Plan:       Right leg swelling  -     Ambulatory referral/consult to Vascular Surgery; Future; Expected date: 08/29/2022    Nausea  -     Amylase; Future; Expected date: 08/22/2022  -     Lipase; Future; Expected date: 08/22/2022    Hyperlipidemia, unspecified hyperlipidemia type  -     Lipid Panel; Future; Expected date: 08/22/2022    Controlled type 2 diabetes mellitus with  neuropathy  -     Hemoglobin A1C; Future; Expected date: 08/22/2022    Nausea and vomiting, intractability of vomiting not specified, unspecified vomiting type  -     promethazine (PHENERGAN) 25 MG tablet; Take 1 tablet (25 mg total) by mouth 2 (two) times daily as needed for Nausea.  Dispense: 60 tablet; Refill: 0      Follow up in about 3 months (around 11/22/2022) for FOLLOW-UP STATUS, FOLLOW UP LABS, FOLLOW UP MEDICATIONS.        8/22/2022 April Horton M.D.

## 2022-08-21 NOTE — PROGRESS NOTES
SUBJECTIVE:    Patient ID: Shara Hutchins is a 72 y.o. female.    Chief Complaint: Edema (Right leg)    HPI     Follow-up meds-    Patient was seen in the ER for leg swelling-right greater than left-venous US was negative-last night she noted some discoloration (bluish) of the right lower leg-it is painful-    Was hospitalized for severe UTI and COVID-thinks she actually got COVID in the hospital--she was on iv antibiotics for a good while post hospitalization-is gong back for stent change for stones -this will be necessary every 6 months    She is waking up with more nausea lately, almost every day    Last labs-H/H 9.3/29.3 platelets 214,000 wbc 8.55 Na 137 K 3.2    Admission on 07/20/2022, Discharged on 07/20/2022   Component Date Value Ref Range Status    WBC 07/20/2022 8.55  3.90 - 12.70 K/uL Final    RBC 07/20/2022 2.76 (A) 4.00 - 5.40 M/uL Final    Hemoglobin 07/20/2022 9.3 (A) 12.0 - 16.0 g/dL Final    Hematocrit 07/20/2022 29.3 (A) 37.0 - 48.5 % Final    MCV 07/20/2022 106 (A) 82 - 98 fL Final    MCH 07/20/2022 33.7 (A) 27.0 - 31.0 pg Final    MCHC 07/20/2022 31.7 (A) 32.0 - 36.0 g/dL Final    RDW 07/20/2022 14.9 (A) 11.5 - 14.5 % Final    Platelets 07/20/2022 214  150 - 450 K/uL Final    MPV 07/20/2022 9.9  9.2 - 12.9 fL Final    Immature Granulocytes 07/20/2022 0.4  0.0 - 0.5 % Final    Gran # (ANC) 07/20/2022 6.1  1.8 - 7.7 K/uL Final    Immature Grans (Abs) 07/20/2022 0.03  0.00 - 0.04 K/uL Final    Lymph # 07/20/2022 1.7  1.0 - 4.8 K/uL Final    Mono # 07/20/2022 0.6  0.3 - 1.0 K/uL Final    Eos # 07/20/2022 0.1  0.0 - 0.5 K/uL Final    Baso # 07/20/2022 0.02  0.00 - 0.20 K/uL Final    nRBC 07/20/2022 0  0 /100 WBC Final    Gran % 07/20/2022 71.6  38.0 - 73.0 % Final    Lymph % 07/20/2022 19.3  18.0 - 48.0 % Final    Mono % 07/20/2022 6.9  4.0 - 15.0 % Final    Eosinophil % 07/20/2022 1.6  0.0 - 8.0 % Final    Basophil % 07/20/2022 0.2  0.0 - 1.9 % Final    Differential Method  07/20/2022 Automated   Final    Sodium 07/20/2022 137  136 - 145 mmol/L Final    Potassium 07/20/2022 3.2 (A) 3.5 - 5.1 mmol/L Final    Chloride 07/20/2022 97  95 - 110 mmol/L Final    CO2 07/20/2022 26  23 - 29 mmol/L Final    Glucose 07/20/2022 97  70 - 110 mg/dL Final    BUN 07/20/2022 8  8 - 23 mg/dL Final    Creatinine 07/20/2022 3.9 (A) 0.5 - 1.4 mg/dL Final    Calcium 07/20/2022 7.7 (A) 8.7 - 10.5 mg/dL Final    Total Protein 07/20/2022 6.1  6.0 - 8.4 g/dL Final    Albumin 07/20/2022 2.8 (A) 3.5 - 5.2 g/dL Final    Total Bilirubin 07/20/2022 0.6  0.1 - 1.0 mg/dL Final    Alkaline Phosphatase 07/20/2022 130  55 - 135 U/L Final    AST 07/20/2022 19  10 - 40 U/L Final    ALT 07/20/2022 10  10 - 44 U/L Final    Anion Gap 07/20/2022 14  8 - 16 mmol/L Final    eGFR if  07/20/2022 12.5 (A) >60 mL/min/1.73 m^2 Final    eGFR if non African American 07/20/2022 10.9 (A) >60 mL/min/1.73 m^2 Final   No results displayed because visit has over 200 results.      No results displayed because visit has over 200 results.      Admission on 06/16/2022, Discharged on 06/18/2022   Component Date Value Ref Range Status    SARS-CoV-2 RNA, Amplification, Qual 06/16/2022 Negative  Negative Final    WBC 06/16/2022 8.39  3.90 - 12.70 K/uL Final    RBC 06/16/2022 3.14 (A) 4.00 - 5.40 M/uL Final    Hemoglobin 06/16/2022 11.1 (A) 12.0 - 16.0 g/dL Final    Hematocrit 06/16/2022 34.1 (A) 37.0 - 48.5 % Final    MCV 06/16/2022 109 (A) 82 - 98 fL Final    MCH 06/16/2022 35.4 (A) 27.0 - 31.0 pg Final    MCHC 06/16/2022 32.6  32.0 - 36.0 g/dL Final    RDW 06/16/2022 13.2  11.5 - 14.5 % Final    Platelets 06/16/2022 200  150 - 450 K/uL Final    MPV 06/16/2022 10.5  9.2 - 12.9 fL Final    Immature Granulocytes 06/16/2022 0.6 (A) 0.0 - 0.5 % Final    Gran # (ANC) 06/16/2022 5.5  1.8 - 7.7 K/uL Final    Immature Grans (Abs) 06/16/2022 0.05 (A) 0.00 - 0.04 K/uL Final    Lymph # 06/16/2022 1.8  1.0 - 4.8  K/uL Final    Mono # 06/16/2022 0.9  0.3 - 1.0 K/uL Final    Eos # 06/16/2022 0.1  0.0 - 0.5 K/uL Final    Baso # 06/16/2022 0.03  0.00 - 0.20 K/uL Final    nRBC 06/16/2022 0  0 /100 WBC Final    Gran % 06/16/2022 65.6  38.0 - 73.0 % Final    Lymph % 06/16/2022 20.9  18.0 - 48.0 % Final    Mono % 06/16/2022 11.1  4.0 - 15.0 % Final    Eosinophil % 06/16/2022 1.4  0.0 - 8.0 % Final    Basophil % 06/16/2022 0.4  0.0 - 1.9 % Final    Differential Method 06/16/2022 Automated   Final    Sodium 06/16/2022 137  136 - 145 mmol/L Final    Potassium 06/16/2022 3.5  3.5 - 5.1 mmol/L Final    Chloride 06/16/2022 103  95 - 110 mmol/L Final    CO2 06/16/2022 23  23 - 29 mmol/L Final    Glucose 06/16/2022 85  70 - 110 mg/dL Final    BUN 06/16/2022 19  8 - 23 mg/dL Final    Creatinine 06/16/2022 4.3 (A) 0.5 - 1.4 mg/dL Final    Calcium 06/16/2022 8.3 (A) 8.7 - 10.5 mg/dL Final    Total Protein 06/16/2022 6.3  6.0 - 8.4 g/dL Final    Albumin 06/16/2022 3.4 (A) 3.5 - 5.2 g/dL Final    Total Bilirubin 06/16/2022 0.4  0.1 - 1.0 mg/dL Final    Alkaline Phosphatase 06/16/2022 110  55 - 135 U/L Final    AST 06/16/2022 12  10 - 40 U/L Final    ALT 06/16/2022 9 (A) 10 - 44 U/L Final    Anion Gap 06/16/2022 11  8 - 16 mmol/L Final    eGFR if  06/16/2022 11.2 (A) >60 mL/min/1.73 m^2 Final    eGFR if non  06/16/2022 9.7 (A) >60 mL/min/1.73 m^2 Final    Troponin I 06/16/2022 <0.030  <=0.040 ng/mL Final    BNP 06/16/2022 229 (A) 0 - 99 pg/mL Final    Influenza A, Molecular 06/16/2022 Negative  Negative Final    Influenza B, Molecular 06/16/2022 Negative  Negative Final    Flu A & B Source 06/16/2022 Nasal swab   Final    Troponin I 06/16/2022 <0.030  <=0.040 ng/mL Final    Magnesium 06/16/2022 1.5 (A) 1.6 - 2.6 mg/dL Final    TSH 06/16/2022 7.000 (A) 0.340 - 5.600 uIU/mL Final    Troponin I 06/17/2022 <0.030  <=0.040 ng/mL Final    BSA 06/17/2022 1.78  m2 Final    TDI SEPTAL  06/17/2022 0.10  m/s Final    LV LATERAL E/E' RATIO 06/17/2022 9.50  m/s Final    LV SEPTAL E/E' RATIO 06/17/2022 11.40  m/s Final    TDI LATERAL 06/17/2022 0.12  m/s Final    LVIDd 06/17/2022 4.81  3.5 - 6.0 cm Final    LVIDs 06/17/2022 2.90  2.1 - 4.0 cm Final    FS 06/17/2022 40  28 - 44 % Final    LA size 06/17/2022 3.85  cm Final    RVDD 06/17/2022 263.00  cm Final    TAPSE 06/17/2022 220.00  cm Final    AV mean gradient 06/17/2022 11  mmHg Final    AV valve area 06/17/2022 2.87  cm2 Final    AV index (prosthetic) 06/17/2022 0.90   Final    E/A ratio 06/17/2022 0.80   Final    Mean e' 06/17/2022 0.11  m/s Final    E wave deceleration time 06/17/2022 393.67  msec Final    LVOT diameter 06/17/2022 2.01  cm Final    LVOT area 06/17/2022 3.2  cm2 Final    LVOT peak durga 06/17/2022 173.07  m/s Final    LVOT peak VTI 06/17/2022 39.78  cm Final    Ao VTI 06/17/2022 43.97  cm Final    LVOT stroke volume 06/17/2022 126.16  cm3 Final    E/E' ratio 06/17/2022 10.36  m/s Final    MV Peak E Durga 06/17/2022 1.14  m/s Final    TR Max Durga 06/17/2022 2.00  m/s Final    MV Peak A Durga 06/17/2022 1.43  m/s Final    LV Systolic Volume 06/17/2022 24.47  mL Final    LV Systolic Volume Index 06/17/2022 13.8  mL/m2 Final    LV Diastolic Volume 06/17/2022 111.61  mL Final    LV Diastolic Volume Index 06/17/2022 63.06  mL/m2 Final    Triscuspid Valve Regurgitation Pea* 06/17/2022 16  mmHg Final    Right Atrial Pressure (from IVC) 06/17/2022 3  mmHg Final    EF 06/17/2022 70  % Final    TV rest pulmonary artery pressure 06/17/2022 19  mmHg Final    Free T4 06/16/2022 0.74  0.71 - 1.51 ng/dL Final    Sodium 06/17/2022 137  136 - 145 mmol/L Final    Potassium 06/17/2022 3.8  3.5 - 5.1 mmol/L Final    Chloride 06/17/2022 103  95 - 110 mmol/L Final    CO2 06/17/2022 25  23 - 29 mmol/L Final    Glucose 06/17/2022 105  70 - 110 mg/dL Final    BUN 06/17/2022 26 (A) 8 - 23 mg/dL Final    Creatinine  06/17/2022 4.8 (A) 0.5 - 1.4 mg/dL Final    Calcium 06/17/2022 8.6 (A) 8.7 - 10.5 mg/dL Final    Anion Gap 06/17/2022 9  8 - 16 mmol/L Final    eGFR if  06/17/2022 9.8 (A) >60 mL/min/1.73 m^2 Final    eGFR if non African American 06/17/2022 8.5 (A) >60 mL/min/1.73 m^2 Final    Magnesium 06/17/2022 2.0  1.6 - 2.6 mg/dL Final    Phosphorus 06/17/2022 4.8 (A) 2.7 - 4.5 mg/dL Final    Troponin I 06/17/2022 <0.030  <=0.040 ng/mL Final    WBC 06/17/2022 5.28  3.90 - 12.70 K/uL Final    RBC 06/17/2022 3.12 (A) 4.00 - 5.40 M/uL Final    Hemoglobin 06/17/2022 11.1 (A) 12.0 - 16.0 g/dL Final    Hematocrit 06/17/2022 34.3 (A) 37.0 - 48.5 % Final    MCV 06/17/2022 110 (A) 82 - 98 fL Final    MCH 06/17/2022 35.6 (A) 27.0 - 31.0 pg Final    MCHC 06/17/2022 32.4  32.0 - 36.0 g/dL Final    RDW 06/17/2022 13.0  11.5 - 14.5 % Final    Platelets 06/17/2022 177  150 - 450 K/uL Final    MPV 06/17/2022 10.5  9.2 - 12.9 fL Final    Immature Granulocytes 06/17/2022 0.8 (A) 0.0 - 0.5 % Final    Gran # (ANC) 06/17/2022 4.6  1.8 - 7.7 K/uL Final    Immature Grans (Abs) 06/17/2022 0.04  0.00 - 0.04 K/uL Final    Lymph # 06/17/2022 0.5 (A) 1.0 - 4.8 K/uL Final    Mono # 06/17/2022 0.1 (A) 0.3 - 1.0 K/uL Final    Eos # 06/17/2022 0.0  0.0 - 0.5 K/uL Final    Baso # 06/17/2022 0.01  0.00 - 0.20 K/uL Final    nRBC 06/17/2022 0  0 /100 WBC Final    Gran % 06/17/2022 87.1 (A) 38.0 - 73.0 % Final    Lymph % 06/17/2022 10.2 (A) 18.0 - 48.0 % Final    Mono % 06/17/2022 1.5 (A) 4.0 - 15.0 % Final    Eosinophil % 06/17/2022 0.2  0.0 - 8.0 % Final    Basophil % 06/17/2022 0.2  0.0 - 1.9 % Final    Differential Method 06/17/2022 Automated   Final    Vitamin B-12 06/17/2022 507  210 - 950 pg/mL Final    Folate 06/17/2022 13.4  4.0 - 24.0 ng/mL Final    Sodium 06/18/2022 138  136 - 145 mmol/L Final    Potassium 06/18/2022 4.0  3.5 - 5.1 mmol/L Final    Chloride 06/18/2022 102  95 - 110 mmol/L Final    CO2  06/18/2022 27  23 - 29 mmol/L Final    Glucose 06/18/2022 128 (A) 70 - 110 mg/dL Final    BUN 06/18/2022 23  8 - 23 mg/dL Final    Creatinine 06/18/2022 3.9 (A) 0.5 - 1.4 mg/dL Final    Calcium 06/18/2022 8.5 (A) 8.7 - 10.5 mg/dL Final    Anion Gap 06/18/2022 9  8 - 16 mmol/L Final    eGFR if  06/18/2022 12.5 (A) >60 mL/min/1.73 m^2 Final    eGFR if non African American 06/18/2022 10.9 (A) >60 mL/min/1.73 m^2 Final    Magnesium 06/18/2022 2.0  1.6 - 2.6 mg/dL Final    Phosphorus 06/18/2022 4.0  2.7 - 4.5 mg/dL Final    WBC 06/18/2022 6.55  3.90 - 12.70 K/uL Final    RBC 06/18/2022 2.94 (A) 4.00 - 5.40 M/uL Final    Hemoglobin 06/18/2022 10.5 (A) 12.0 - 16.0 g/dL Final    Hematocrit 06/18/2022 32.7 (A) 37.0 - 48.5 % Final    MCV 06/18/2022 111 (A) 82 - 98 fL Final    MCH 06/18/2022 35.7 (A) 27.0 - 31.0 pg Final    MCHC 06/18/2022 32.1  32.0 - 36.0 g/dL Final    RDW 06/18/2022 12.9  11.5 - 14.5 % Final    Platelets 06/18/2022 214  150 - 450 K/uL Final    MPV 06/18/2022 10.2  9.2 - 12.9 fL Final    Immature Granulocytes 06/18/2022 0.6 (A) 0.0 - 0.5 % Final    Gran # (ANC) 06/18/2022 5.9  1.8 - 7.7 K/uL Final    Immature Grans (Abs) 06/18/2022 0.04  0.00 - 0.04 K/uL Final    Lymph # 06/18/2022 0.4 (A) 1.0 - 4.8 K/uL Final    Mono # 06/18/2022 0.2 (A) 0.3 - 1.0 K/uL Final    Eos # 06/18/2022 0.0  0.0 - 0.5 K/uL Final    Baso # 06/18/2022 0.00  0.00 - 0.20 K/uL Final    nRBC 06/18/2022 0  0 /100 WBC Final    Gran % 06/18/2022 90.4 (A) 38.0 - 73.0 % Final    Lymph % 06/18/2022 6.4 (A) 18.0 - 48.0 % Final    Mono % 06/18/2022 2.6 (A) 4.0 - 15.0 % Final    Eosinophil % 06/18/2022 0.0  0.0 - 8.0 % Final    Basophil % 06/18/2022 0.0  0.0 - 1.9 % Final    Differential Method 06/18/2022 Automated   Final   No results displayed because visit has over 200 results.      No results displayed because visit has over 200 results.      Admission on 09/15/2021, Discharged on 09/16/2021    Component Date Value Ref Range Status    WBC 09/15/2021 6.71  3.90 - 12.70 K/uL Final    RBC 09/15/2021 2.31 (A) 4.00 - 5.40 M/uL Final    Hemoglobin 09/15/2021 8.4 (A) 12.0 - 16.0 g/dL Final    Hematocrit 09/15/2021 26.0 (A) 37.0 - 48.5 % Final    MCV 09/15/2021 113 (A) 82 - 98 fL Final    MCH 09/15/2021 36.4 (A) 27.0 - 31.0 pg Final    MCHC 09/15/2021 32.3  32.0 - 36.0 g/dL Final    RDW 09/15/2021 16.1 (A) 11.5 - 14.5 % Final    Platelets 09/15/2021 179  150 - 450 K/uL Final    MPV 09/15/2021 10.0  9.2 - 12.9 fL Final    Immature Granulocytes 09/15/2021 0.3  0.0 - 0.5 % Final    Gran # (ANC) 09/15/2021 5.2  1.8 - 7.7 K/uL Final    Immature Grans (Abs) 09/15/2021 0.02  0.00 - 0.04 K/uL Final    Lymph # 09/15/2021 0.8 (A) 1.0 - 4.8 K/uL Final    Mono # 09/15/2021 0.5  0.3 - 1.0 K/uL Final    Eos # 09/15/2021 0.1  0.0 - 0.5 K/uL Final    Baso # 09/15/2021 0.01  0.00 - 0.20 K/uL Final    nRBC 09/15/2021 0  0 /100 WBC Final    Gran % 09/15/2021 78.0 (A) 38.0 - 73.0 % Final    Lymph % 09/15/2021 12.5 (A) 18.0 - 48.0 % Final    Mono % 09/15/2021 7.5  4.0 - 15.0 % Final    Eosinophil % 09/15/2021 1.6  0.0 - 8.0 % Final    Basophil % 09/15/2021 0.1  0.0 - 1.9 % Final    Differential Method 09/15/2021 Automated   Final    Sodium 09/15/2021 141  136 - 145 mmol/L Final    Potassium 09/15/2021 3.0 (A) 3.5 - 5.1 mmol/L Final    Chloride 09/15/2021 99  95 - 110 mmol/L Final    CO2 09/15/2021 28  23 - 29 mmol/L Final    Glucose 09/15/2021 123 (A) 70 - 110 mg/dL Final    BUN 09/15/2021 13  8 - 23 mg/dL Final    Creatinine 09/15/2021 2.6 (A) 0.5 - 1.4 mg/dL Final    Calcium 09/15/2021 8.2 (A) 8.7 - 10.5 mg/dL Final    Total Protein 09/15/2021 6.2  6.0 - 8.4 g/dL Final    Albumin 09/15/2021 3.2 (A) 3.5 - 5.2 g/dL Final    Total Bilirubin 09/15/2021 0.7  0.1 - 1.0 mg/dL Final    Alkaline Phosphatase 09/15/2021 99  55 - 135 U/L Final    AST 09/15/2021 15  10 - 40 U/L Final    ALT 09/15/2021 8  (A) 10 - 44 U/L Final    Anion Gap 09/15/2021 14  8 - 16 mmol/L Final    eGFR if  09/15/2021 20.6 (A) >60 mL/min/1.73 m^2 Final    eGFR if non African American 09/15/2021 17.9 (A) >60 mL/min/1.73 m^2 Final    Magnesium 09/15/2021 1.4 (A) 1.6 - 2.6 mg/dL Final    aPTT 09/15/2021 30.6  25.6 - 35.8 sec Final    PT 09/15/2021 16.2 (A) 11.8 - 14.3 sec Final    INR 09/15/2021 1.4   Final    Group & Rh 09/15/2021 A POS   Final    Indirect Bruno 09/15/2021 NEG   Final    SARS-CoV-2 RNA, Amplification, Qual 09/15/2021 Negative  Negative Final    UNIT NUMBER 09/15/2021 [id number]   Final    Product Code 09/15/2021 [id number]   Final    DISPENSE STATUS 09/15/2021 TRANSFUSED   Final    CODING SYSTEM 09/15/2021 [id number]   Final    Unit Blood Type Code 09/15/2021 6200   Final    Unit Blood Type 09/15/2021 A POS   Final    Unit Expiration 09/15/2021 [id number]   Final    Troponin I 09/15/2021 <0.030  <=0.040 ng/mL Final    Troponin I 09/16/2021 <0.030  <=0.040 ng/mL Final    WBC 09/16/2021 6.09  3.90 - 12.70 K/uL Final    RBC 09/16/2021 2.48 (A) 4.00 - 5.40 M/uL Final    Hemoglobin 09/16/2021 9.0 (A) 12.0 - 16.0 g/dL Final    Hematocrit 09/16/2021 28.2 (A) 37.0 - 48.5 % Final    MCV 09/16/2021 114 (A) 82 - 98 fL Final    MCH 09/16/2021 36.3 (A) 27.0 - 31.0 pg Final    MCHC 09/16/2021 31.9 (A) 32.0 - 36.0 g/dL Final    RDW 09/16/2021 18.1 (A) 11.5 - 14.5 % Final    Platelets 09/16/2021 158  150 - 450 K/uL Final    MPV 09/16/2021 9.9  9.2 - 12.9 fL Final    Immature Granulocytes 09/16/2021 0.5  0.0 - 0.5 % Final    Gran # (ANC) 09/16/2021 4.4  1.8 - 7.7 K/uL Final    Immature Grans (Abs) 09/16/2021 0.03  0.00 - 0.04 K/uL Final    Lymph # 09/16/2021 1.1  1.0 - 4.8 K/uL Final    Mono # 09/16/2021 0.5  0.3 - 1.0 K/uL Final    Eos # 09/16/2021 0.1  0.0 - 0.5 K/uL Final    Baso # 09/16/2021 0.02  0.00 - 0.20 K/uL Final    nRBC 09/16/2021 0  0 /100 WBC Final    Gran % 09/16/2021  71.5  38.0 - 73.0 % Final    Lymph % 09/16/2021 17.2 (A) 18.0 - 48.0 % Final    Mono % 09/16/2021 8.5  4.0 - 15.0 % Final    Eosinophil % 09/16/2021 2.0  0.0 - 8.0 % Final    Basophil % 09/16/2021 0.3  0.0 - 1.9 % Final    Differential Method 09/16/2021 Automated   Final    Sodium 09/16/2021 141  136 - 145 mmol/L Final    Potassium 09/16/2021 3.8  3.5 - 5.1 mmol/L Final    Chloride 09/16/2021 100  95 - 110 mmol/L Final    CO2 09/16/2021 30 (A) 23 - 29 mmol/L Final    Glucose 09/16/2021 78  70 - 110 mg/dL Final    BUN 09/16/2021 22  8 - 23 mg/dL Final    Creatinine 09/16/2021 3.6 (A) 0.5 - 1.4 mg/dL Final    Calcium 09/16/2021 8.0 (A) 8.7 - 10.5 mg/dL Final    Total Protein 09/16/2021 5.8 (A) 6.0 - 8.4 g/dL Final    Albumin 09/16/2021 3.0 (A) 3.5 - 5.2 g/dL Final    Total Bilirubin 09/16/2021 0.7  0.1 - 1.0 mg/dL Final    Alkaline Phosphatase 09/16/2021 96  55 - 135 U/L Final    AST 09/16/2021 11  10 - 40 U/L Final    ALT 09/16/2021 9 (A) 10 - 44 U/L Final    Anion Gap 09/16/2021 11  8 - 16 mmol/L Final    eGFR if  09/16/2021 13.9 (A) >60 mL/min/1.73 m^2 Final    eGFR if non  09/16/2021 12.1 (A) >60 mL/min/1.73 m^2 Final       Past Medical History:   Diagnosis Date    Cellulitis of trunk     Perineum, numerous episodes    Chronic diarrhea 02/10/2020    Short-gut syndrome    Diabetes mellitus, type 2     pt has not had for years after losing weight    End stage renal disease on dialysis 01/07/2018    Fatigue     Hypertension     Hypothyroidism 2/10/2020    Iron deficiency anemia due to chronic blood loss 1/7/2018    Pulmonary hypertension 3/20/2020    Vulvar cancer 1/7/2019     Past Surgical History:   Procedure Laterality Date    ABDOMINAL SURGERY      ANGIOGRAPHY OF ARTERIOVENOUS SHUNT N/A 1/19/2022    Procedure: Fistulogram with Possible Intervention;  Surgeon: Raz Maher MD;  Location: [facility] CATH/EP LAB;  Service: Cardiothoracic;  Laterality:  N/A;    APPENDECTOMY      AV FISTULA PLACEMENT Left 2018    BACK SURGERY      BLADDER FULGURATION N/A 6/16/2020    Procedure: FULGURATION, BLADDER;  Surgeon: Damari Barber Jr., MD;  Location: [facility];  Service: Urology;  Laterality: N/A;    carpel tunnel surgery once on each hand      CERVICAL FUSION      x 4    cervix repair      CHOLECYSTECTOMY  2000    COLECTOMY      CYSTOSCOPY W/ RETROGRADES  6/16/2020    Procedure: CYSTOSCOPY, WITH RETROGRADE PYELOGRAM;  Surgeon: Damari Barber Jr., MD;  Location: [facility] OR;  Service: Urology;;    CYSTOSCOPY W/ RETROGRADES Right 4/20/2021    Procedure: CYSTOSCOPY, WITH RETROGRADE PYELOGRAM;  Surgeon: Damari Barber Jr., MD;  Location: [facility] OR;  Service: Urology;  Laterality: Right;    CYSTOSCOPY W/ URETERAL STENT PLACEMENT Right 3/16/2021    Procedure: CYSTOSCOPY, WITH URETERAL STENT INSERTION;  Surgeon: Damari Barber Jr., MD;  Location: [facility] OR;  Service: Urology;  Laterality: Right;    CYSTOSCOPY W/ URETERAL STENT PLACEMENT Right 5/18/2021    Procedure: CYSTOSCOPY, WITH URETERAL STENT INSERTION;  Surgeon: Damari Barber Jr., MD;  Location: [facility] OR;  Service: Urology;  Laterality: Right;    CYSTOSCOPY W/ URETERAL STENT PLACEMENT Right 6/22/2022    Procedure: CYSTOSCOPY, WITH URETERAL STENT INSERTION;  Surgeon: Gaby Garcia MD;  Location: [facility] OR;  Service: Urology;  Laterality: Right;  cysto stent exchange    CYSTOSCOPY W/ URETERAL STENT REMOVAL Right 4/20/2021    Procedure: CYSTOSCOPY, WITH URETERAL STENT REMOVAL;  Surgeon: Damari Barber Jr., MD;  Location: [facility] OR;  Service: Urology;  Laterality: Right;    CYSTOSCOPY WITH URETEROSCOPY, RETROGRADE PYELOGRAPHY, AND INSERTION OF STENT Right 3/30/2021    Procedure: CYSTOSCOPY, WITH RETROGRADE PYELOGRAM AND URETERAL STENT INSERTION;  Surgeon: Damari Barber Jr., MD;  Location: [facility] OR;  Service: Urology;  Laterality: Right;    CYSTOURETEROSCOPY WITH RETROGRADE PYELOGRAPHY AND INSERTION OF STENT INTO  URETER Right 1/18/2022    Procedure: CYSTOURETEROSCOPY, WITH RETROGRADE PYELOGRAM AND URETERAL STENT INSERTION;  Surgeon: Damari Barber Jr., MD;  Location: [facility] OR;  Service: Urology;  Laterality: Right;    DIAGNOSTIC LAPAROSCOPY N/A 2/13/2020    Procedure: LAPAROSCOPY, DIAGNOSTIC;  Surgeon: Robbi Lovell III, MD;  Location: [facility] OR;  Service: General;  Laterality: N/A;    HYSTERECTOMY  1980    ILEOSTOMY N/A 2/13/2020    Procedure: CREATION, ILEOSTOMY Loop;  Surgeon: Robbi Lovell III, MD;  Location: [facility] OR;  Service: General;  Laterality: N/A;    ILEOSTOMY CLOSURE N/A 5/18/2020    Procedure: CLOSURE, ILEOSTOMY;  Surgeon: Robbi Lovell III, MD;  Location: [facility] OR;  Service: General;  Laterality: N/A;    LASER LITHOTRIPSY Right 3/30/2021    Procedure: LITHOTRIPSY, USING LASER;  Surgeon: Damari Barber Jr., MD;  Location: [facility] OR;  Service: Urology;  Laterality: Right;    LUMBAR LAMINECTOMY      LYSIS OF ADHESIONS N/A 2/13/2020    Procedure: LYSIS, ADHESIONS;  Surgeon: Robbi Lovell III, MD;  Location: [facility] OR;  Service: General;  Laterality: N/A;    NECK SURGERY      PERCUTANEOUS TRANSLUMINAL ANGIOPLASTY OF ARTERIOVENOUS FISTULA N/A 1/19/2022    Procedure: PTA, AV FISTULA;  Surgeon: Raz Maher MD;  Location: [facility] CATH/EP LAB;  Service: Cardiothoracic;  Laterality: N/A;    PHLEBOGRAPHY  2/28/2020    Procedure: VENOGRAM;  Surgeon: Robbi Lovell III, MD;  Location: [facility] OR;  Service: General;;    REMOVAL OF VASCULAR ACCESS PORT Right 2/13/2020    Procedure: REMOVAL, VASCULAR ACCESS PORT;  Surgeon: Robbi Lovell III, MD;  Location: [facility] OR;  Service: General;  Laterality: Right;    RETROGRADE PYELOGRAPHY  3/16/2021    Procedure: PYELOGRAM, RETROGRADE;  Surgeon: Damari Barber Jr., MD;  Location: [facility] OR;  Service: Urology;;    RETROGRADE PYELOGRAPHY Right 5/18/2021    Procedure: PYELOGRAM, RETROGRADE;  Surgeon: Damari Barber Jr., MD;  Location: [facility] OR;  Service:  Urology;  Laterality: Right;    RETROGRADE PYELOGRAPHY  6/22/2022    Procedure: PYELOGRAM, RETROGRADE;  Surgeon: Gaby Garcia MD;  Location: [facility] OR;  Service: Urology;;    SHOULDER SURGERY      URETEROSCOPIC REMOVAL OF URETERIC CALCULUS Right 3/30/2021    Procedure: REMOVAL, CALCULUS, URETER, URETEROSCOPIC;  Surgeon: Damari Barber Jr., MD;  Location: [facility] OR;  Service: Urology;  Laterality: Right;    URETEROSCOPIC REMOVAL OF URETERIC CALCULUS Right 4/20/2021    Procedure: REMOVAL, CALCULUS, URETER, URETEROSCOPIC;  Surgeon: Damari Barber Jr., MD;  Location: [facility] OR;  Service: Urology;  Laterality: Right;    vulva cancer       Family History   Problem Relation Age of Onset    Heart disease Mother     Hypertension Mother     Cancer Mother         lung    Heart disease Father     Heart disease Sister         Open heart surgery    No Known Problems Daughter        Marital Status:   Alcohol History:  reports no history of alcohol use.  Tobacco History:  reports that she quit smoking about 22 years ago. Her smoking use included cigarettes. She has a 33.00 pack-year smoking history. She quit smokeless tobacco use about 22 years ago.  Drug History:  reports no history of drug use.    Review of patient's allergies indicates:   Allergen Reactions    Ciprofloxacin Other (See Comments)     Elevated liver enzymes      Pcn [penicillins] Anaphylaxis     TOLERATES ROCEPHIN WITHOUT DIFFICULTY       Current Outpatient Medications:     cholecalciferol, vitamin D3, (VITAMIN D3) 25 mcg (1,000 unit) capsule, Take 1 capsule (1,000 Units total) by mouth once daily., Disp: 10 capsule, Rfl: 0    diltiaZEM (CARDIZEM CD) 120 MG Cp24, Take 1 capsule (120 mg total) by mouth once daily., Disp: 90 capsule, Rfl: 3    ferric citrate (AURYXIA) 210 mg iron Tab, Take 2 tablets by mouth 3 (three) times daily with meals., Disp: , Rfl:     gabapentin (NEURONTIN) 100 MG capsule, Take 100 mg by mouth 2 (two) times  daily. HCS, Disp: , Rfl:     hydrALAZINE (APRESOLINE) 50 MG tablet, Take 1 tablet (50 mg total) by mouth every 8 (eight) hours., Disp: 270 tablet, Rfl: 0    levothyroxine (SYNTHROID) 88 MCG tablet, TAKE 1 TABLET BEFORE BREAKFAST (Patient taking differently: Take 88 mcg by mouth before breakfast. TAKE 1 TABLET BEFORE BREAKFAST), Disp: 90 tablet, Rfl: 3    LORazepam (ATIVAN) 0.5 MG tablet, Take 1 tablet (0.5 mg total) by mouth every evening., Disp: 90 tablet, Rfl: 1    rOPINIRole (REQUIP) 0.5 MG tablet, Take 0.5 mg by mouth every 8 (eight) hours., Disp: , Rfl:     sertraline (ZOLOFT) 50 MG tablet, Take 1 tablet (50 mg total) by mouth every evening. Take 50 mg by mouth every evening., Disp: 90 tablet, Rfl: 1    traMADoL (ULTRAM) 50 mg tablet, Take 1 tablet (50 mg total) by mouth every 6 (six) hours as needed., Disp: 30 tablet, Rfl: 0    zinc gluconate 50 mg tablet, Take 50 mg by mouth once daily., Disp: , Rfl:     meropenem (MERREM) 500 mg injection, Inject into the vein., Disp: , Rfl:     meropenem-0.9% sodium chloride 500 mg/50 mL PgBk, Inject 50 mLs (500 mg total) into the vein every evening. (Patient not taking: Reported on 8/22/2022), Disp: , Rfl:     promethazine (PHENERGAN) 25 MG tablet, Take 1 tablet (25 mg total) by mouth 2 (two) times daily as needed for Nausea., Disp: 60 tablet, Rfl: 0  No current facility-administered medications for this visit.    Facility-Administered Medications Ordered in Other Visits:     electrolyte-S (ISOLYTE), , Intravenous, Continuous, Raz Ruff MD    lactated ringers infusion, , Intravenous, Continuous, Ruy Beltre MD    LIDOcaine (PF) 10 mg/ml (1%) injection 5 mg, 0.5 mL, Intradermal, Once, Raz Ruff MD    Review of Systems   Constitutional: Negative for appetite change, chills, diaphoresis, fatigue, fever and unexpected weight change.   HENT: Positive for voice change (post COVID). Negative for congestion, ear pain, hearing loss, nosebleeds,  postnasal drip, sinus pressure, sinus pain, sneezing, sore throat, tinnitus and trouble swallowing.    Eyes: Negative for photophobia, pain, itching and visual disturbance.   Respiratory: Positive for cough (post COVID). Negative for apnea, chest tightness, shortness of breath, wheezing and stridor.    Cardiovascular: Negative for chest pain, palpitations and leg swelling.   Gastrointestinal: Positive for abdominal pain (stones) and diarrhea (has slowed with new probiotic). Negative for abdominal distention, blood in stool, constipation, nausea and vomiting.   Endocrine: Negative for cold intolerance, heat intolerance, polydipsia and polyuria.   Genitourinary: Negative for difficulty urinating, dyspareunia, dysuria, flank pain, frequency, hematuria, menstrual problem, pelvic pain, urgency, vaginal discharge and vaginal pain.        HPI   Musculoskeletal: Positive for back pain (right lower back). Negative for arthralgias, joint swelling, myalgias, neck pain and neck stiffness.   Skin: Negative for pallor.   Allergic/Immunologic: Negative for environmental allergies and food allergies.   Neurological: Positive for tremors. Negative for dizziness, speech difficulty, weakness, light-headedness and numbness.   Hematological: Does not bruise/bleed easily.   Psychiatric/Behavioral: Negative for agitation, confusion, decreased concentration, sleep disturbance and suicidal ideas. The patient is not nervous/anxious.           Objective:      Vitals    Vitals - 1 value per visit 7/8/2022 7/20/2022 7/20/2022 8/22/2022 8/22/2022   SYSTOLIC 114 137 144 - 120   DIASTOLIC 52 69 65 - 58   Pulse 85 92 78 - 94   Temp - 98.9 - - 98.1   Resp - 18 17 - 14   SPO2 - - 95 - 98   Weight (lb) 148.7 142 - - 149   Weight (kg) 67.45 64.411 - - 67.586   Height 67 67 - - 67   BMI (Calculated) 23.3 22.2 - - 23.3   VISIT REPORT - - - - -   Pain Score  - - - 6 -   Some recent data might be hidden       Physical Exam  Vitals and nursing note  reviewed.   Constitutional:       General: She is not in acute distress.     Appearance: She is not ill-appearing, toxic-appearing or diaphoretic.   HENT:      Head: Normocephalic and atraumatic.   Eyes:      Extraocular Movements: Extraocular movements intact.      Pupils: Pupils are equal, round, and reactive to light.   Neck:      Vascular: No carotid bruit.   Cardiovascular:      Rate and Rhythm: Normal rate and regular rhythm.      Heart sounds: Murmur (2/6) heard.   Pulmonary:      Effort: Pulmonary effort is normal.      Breath sounds: Normal breath sounds.   Abdominal:      General: Bowel sounds are normal.      Palpations: Abdomen is soft.   Musculoskeletal:         General: Swelling (tight teri right greater thn left) and tenderness (right lower extremity) present.      Cervical back: Normal range of motion and neck supple.   Lymphadenopathy:      Cervical: No cervical adenopathy.   Skin:     General: Skin is warm and dry.      Capillary Refill: Capillary refill takes less than 2 seconds.   Neurological:      General: No focal deficit present.      Mental Status: She is alert and oriented to person, place, and time.   Psychiatric:         Mood and Affect: Mood normal.         Thought Content: Thought content normal.         Judgment: Judgment normal.           Assessment:       1. Right leg swelling    2. Nausea    3. Hyperlipidemia, unspecified hyperlipidemia type    4. 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 Nausea and vomiting, intractability of vomiting not specified, unspecified vomiting type         Plan:       Right leg swelling  -     Ambulatory referral/consult to Vascular Surgery; Future; Expected date: 08/29/2022    Nausea  -     Amylase; Future; Expected date: 08/22/2022  -     Lipase; Future; Expected date: 08/22/2022    Hyperlipidemia, unspecified hyperlipidemia type  -     Lipid Panel; Future; Expected date: 08/22/2022    Controlled type 2 diabetes mellitus with  neuropathy  -     Hemoglobin A1C; Future; Expected date: 08/22/2022    Nausea and vomiting, intractability of vomiting not specified, unspecified vomiting type  -     promethazine (PHENERGAN) 25 MG tablet; Take 1 tablet (25 mg total) by mouth 2 (two) times daily as needed for Nausea.  Dispense: 60 tablet; Refill: 0      Follow up in about 3 months (around 11/22/2022) for FOLLOW-UP STATUS, FOLLOW UP LABS, FOLLOW UP MEDICATIONS.        8/22/2022 April Horton M.D.

## 2022-08-27 NOTE — DISCHARGE INSTRUCTIONS
Tylenol if needed for pain   Head injury precautions for the next 24 hours   Staple removal with your primary care provider in the next 10 days   Return if condition becomes worse for any concerns

## 2022-08-27 NOTE — ED PROVIDER NOTES
Encounter Date: 8/27/2022       History     Chief Complaint   Patient presents with    Fall     TRIP AND FALL TODAY, NO LOC    Head Injury     NO LOC     72-year-old female presents emergency department past medical history includes end-stage renal disease currently on hemodialysis, hypertension, hypothyroidism, diabetes.  Patient currently lives in of daily a she states that she has planting tomatoes she reports that she was going outside this morning to see the tomatoes however there were chairs in front of the patio door she attempted to move to chairs that were connected together and 1 of the chairs got stuck on something on the floor causing her to fall backwards she did strike her head however she denies LOC she does not take any blood thinners.  Patient also sustained a small abrasion to the left elbow patient has no further complaints.     Review of patient's allergies indicates:   Allergen Reactions    Ciprofloxacin Other (See Comments)     Elevated liver enzymes      Pcn [penicillins] Anaphylaxis     TOLERATES ROCEPHIN WITHOUT DIFFICULTY     Past Medical History:   Diagnosis Date    Cellulitis of trunk     Perineum, numerous episodes    Chronic diarrhea 02/10/2020    Short-gut syndrome    Diabetes mellitus, type 2     pt has not had for years after losing weight    End stage renal disease on dialysis 01/07/2018    Fatigue     Hypertension     Hypothyroidism 2/10/2020    Iron deficiency anemia due to chronic blood loss 1/7/2018    Pulmonary hypertension 3/20/2020    Vulvar cancer 1/7/2019     Past Surgical History:   Procedure Laterality Date    ABDOMINAL SURGERY      ANGIOGRAPHY OF ARTERIOVENOUS SHUNT N/A 1/19/2022    Procedure: Fistulogram with Possible Intervention;  Surgeon: Raz Maher MD;  Location: [facility] CATH/EP LAB;  Service: Cardiothoracic;  Laterality: N/A;    APPENDECTOMY      AV FISTULA PLACEMENT Left 2018    BACK SURGERY      BLADDER FULGURATION N/A 6/16/2020    Procedure: FULGURATION,  BLADDER;  Surgeon: Damari Barber Jr., MD;  Location: [facility] OR;  Service: Urology;  Laterality: N/A;    carpel tunnel surgery once on each hand      CERVICAL FUSION      x 4    cervix repair      CHOLECYSTECTOMY  2000    COLECTOMY      CYSTOSCOPY W/ RETROGRADES  6/16/2020    Procedure: CYSTOSCOPY, WITH RETROGRADE PYELOGRAM;  Surgeon: Damari Barber Jr., MD;  Location: [facility] OR;  Service: Urology;;    CYSTOSCOPY W/ RETROGRADES Right 4/20/2021    Procedure: CYSTOSCOPY, WITH RETROGRADE PYELOGRAM;  Surgeon: Damari Barber Jr., MD;  Location: [facility] OR;  Service: Urology;  Laterality: Right;    CYSTOSCOPY W/ URETERAL STENT PLACEMENT Right 3/16/2021    Procedure: CYSTOSCOPY, WITH URETERAL STENT INSERTION;  Surgeon: Damari Barber Jr., MD;  Location: [facility] OR;  Service: Urology;  Laterality: Right;    CYSTOSCOPY W/ URETERAL STENT PLACEMENT Right 5/18/2021    Procedure: CYSTOSCOPY, WITH URETERAL STENT INSERTION;  Surgeon: Damari Barber Jr., MD;  Location: [facility] OR;  Service: Urology;  Laterality: Right;    CYSTOSCOPY W/ URETERAL STENT PLACEMENT Right 6/22/2022    Procedure: CYSTOSCOPY, WITH URETERAL STENT INSERTION;  Surgeon: Gaby Garcia MD;  Location: [facility] OR;  Service: Urology;  Laterality: Right;  cysto stent exchange    CYSTOSCOPY W/ URETERAL STENT REMOVAL Right 4/20/2021    Procedure: CYSTOSCOPY, WITH URETERAL STENT REMOVAL;  Surgeon: Damari Barber Jr., MD;  Location: [facility] OR;  Service: Urology;  Laterality: Right;    CYSTOSCOPY WITH URETEROSCOPY, RETROGRADE PYELOGRAPHY, AND INSERTION OF STENT Right 3/30/2021    Procedure: CYSTOSCOPY, WITH RETROGRADE PYELOGRAM AND URETERAL STENT INSERTION;  Surgeon: Damari Barber Jr., MD;  Location: [facility] OR;  Service: Urology;  Laterality: Right;    CYSTOURETEROSCOPY WITH RETROGRADE PYELOGRAPHY AND INSERTION OF STENT INTO URETER Right 1/18/2022    Procedure: CYSTOURETEROSCOPY, WITH RETROGRADE PYELOGRAM AND URETERAL STENT INSERTION;  Surgeon: Damari Barber Jr., MD;  Location:  [facility] OR;  Service: Urology;  Laterality: Right;    DIAGNOSTIC LAPAROSCOPY N/A 2/13/2020    Procedure: LAPAROSCOPY, DIAGNOSTIC;  Surgeon: Robbi Lovell III, MD;  Location: [facility] OR;  Service: General;  Laterality: N/A;    HYSTERECTOMY  1980    ILEOSTOMY N/A 2/13/2020    Procedure: CREATION, ILEOSTOMY Loop;  Surgeon: Robbi Lovell III, MD;  Location: [facility] OR;  Service: General;  Laterality: N/A;    ILEOSTOMY CLOSURE N/A 5/18/2020    Procedure: CLOSURE, ILEOSTOMY;  Surgeon: Robbi Lovell III, MD;  Location: [facility] OR;  Service: General;  Laterality: N/A;    LASER LITHOTRIPSY Right 3/30/2021    Procedure: LITHOTRIPSY, USING LASER;  Surgeon: Damari Barber Jr., MD;  Location: [facility] OR;  Service: Urology;  Laterality: Right;    LUMBAR LAMINECTOMY      LYSIS OF ADHESIONS N/A 2/13/2020    Procedure: LYSIS, ADHESIONS;  Surgeon: Robbi Lovell III, MD;  Location: [facility] OR;  Service: General;  Laterality: N/A;    NECK SURGERY      PERCUTANEOUS TRANSLUMINAL ANGIOPLASTY OF ARTERIOVENOUS FISTULA N/A 1/19/2022    Procedure: PTA, AV FISTULA;  Surgeon: Raz Maher MD;  Location: [facility] CATH/EP LAB;  Service: Cardiothoracic;  Laterality: N/A;    PHLEBOGRAPHY  2/28/2020    Procedure: VENOGRAM;  Surgeon: Robbi Lovell III, MD;  Location: [facility] OR;  Service: General;;    REMOVAL OF VASCULAR ACCESS PORT Right 2/13/2020    Procedure: REMOVAL, VASCULAR ACCESS PORT;  Surgeon: Robbi Lovell III, MD;  Location: [facility] OR;  Service: General;  Laterality: Right;    RETROGRADE PYELOGRAPHY  3/16/2021    Procedure: PYELOGRAM, RETROGRADE;  Surgeon: Damari Barber Jr., MD;  Location: [facility] OR;  Service: Urology;;    RETROGRADE PYELOGRAPHY Right 5/18/2021    Procedure: PYELOGRAM, RETROGRADE;  Surgeon: Damari Barber Jr., MD;  Location: [facility] OR;  Service: Urology;  Laterality: Right;    RETROGRADE PYELOGRAPHY  6/22/2022    Procedure: PYELOGRAM, RETROGRADE;  Surgeon: Gaby Garcia MD;  Location: [facility];  Service:  Urology;;    SHOULDER SURGERY      URETEROSCOPIC REMOVAL OF URETERIC CALCULUS Right 3/30/2021    Procedure: REMOVAL, CALCULUS, URETER, URETEROSCOPIC;  Surgeon: Damari Barber Jr., MD;  Location: [facility] OR;  Service: Urology;  Laterality: Right;    URETEROSCOPIC REMOVAL OF URETERIC CALCULUS Right 2021    Procedure: REMOVAL, CALCULUS, URETER, URETEROSCOPIC;  Surgeon: Damari Barber Jr., MD;  Location: [facility] OR;  Service: Urology;  Laterality: Right;    vulva cancer       Family History   Problem Relation Age of Onset    Heart disease Mother     Hypertension Mother     Cancer Mother         lung    Heart disease Father     Heart disease Sister         Open heart surgery    No Known Problems Daughter      Social History     Tobacco Use    Smoking status: Former     Packs/day: 1.00     Years: 33.00     Pack years: 33.00     Types: Cigarettes     Quit date: 2000     Years since quittin.6    Smokeless tobacco: Former     Quit date:    Substance Use Topics    Alcohol use: No    Drug use: No     Review of Systems   Constitutional: Negative.    HENT: Negative.     Cardiovascular: Negative.    Gastrointestinal: Negative.    Genitourinary: Negative.    Musculoskeletal: Negative.    Skin:  Positive for wound.        Abrasion to left elbow   Neurological: Negative.    Hematological: Negative.    Psychiatric/Behavioral: Negative.     All other systems reviewed and are negative.    Physical Exam     Initial Vitals [22 0842]   BP Pulse Resp Temp SpO2   (!) 179/81 95 18 98.6 °F (37 °C) 98 %      MAP       --         Physical Exam    Nursing note and vitals reviewed.  Constitutional: She appears well-developed and well-nourished.   HENT:   Right Ear: External ear normal.   Left Ear: External ear normal.   Eyes: EOM are normal. Pupils are equal, round, and reactive to light.   Neck: Neck supple.   C-collar is in place   Normal range of motion.  Cardiovascular:  Normal rate, regular rhythm, normal heart sounds and  intact distal pulses.           Pulmonary/Chest: Breath sounds normal.   Abdominal: Abdomen is soft. Bowel sounds are normal.   Musculoskeletal:         General: No tenderness or edema.      Cervical back: Normal range of motion and neck supple.      Comments: No decreased range of motion noted to the left elbow no obvious swelling patient denies bony tenderness     Skin:   Small abrasion to the left elbow       ED Course   Lac Repair    Date/Time: 8/27/2022 11:09 AM  Performed by: ANDRÉS Amos  Authorized by: Hung Landaverde MD     Consent:     Consent obtained:  Verbal    Consent given by:  Patient    Risks discussed:  Infection, need for additional repair, nerve damage, pain, poor cosmetic result, poor wound healing, retained foreign body, tendon damage and vascular damage  Universal protocol:     Patient identity confirmed:  Verbally with patient  Laceration details:     Location:  Scalp    Scalp location:  Occipital    Length (cm):  2  Pre-procedure details:     Preparation:  Patient was prepped and draped in usual sterile fashion and imaging obtained to evaluate for foreign bodies  Exploration:     Limited defect created (wound extended): no      Imaging outcome: foreign body not noted      Contaminated: no    Treatment:     Area cleansed with:  Saline    Amount of cleaning:  Standard    Irrigation solution:  Sterile saline    Irrigation method:  Syringe    Visualized foreign bodies/material removed: no      Debridement:  None    Undermining:  None  Skin repair:     Repair method:  Staples    Number of staples:  6  Approximation:     Approximation:  Close  Repair type:     Repair type:  Simple  Post-procedure details:     Procedure completion:  Tolerated well, no immediate complications  Labs Reviewed - No data to display       Imaging Results              X-Ray Elbow Complete Left (Final result)  Result time 08/27/22 10:24:04      Final result by Meredith Colunga MD (08/27/22 10:24:04)                    Narrative:    Four views left elbow    Clinical history is pain after fall    There are no fractures, dislocations or acute osseous abnormalities. The joint spaces are maintained. There are surgical clips in the anterior medial soft tissues adjacent to the distal humerus.    IMPRESSION: No evidence of fracture    Electronically signed by:  Meredith Colunga MD  8/27/2022 10:24 AM CDT Workstation: [id number]                                     CT Cervical Spine Without Contrast (Final result)  Result time 08/27/22 09:54:25      Final result by Meredith Colunga MD (08/27/22 09:54:25)                   Narrative:    All CT scans at this facility used dose modulation, iterative reconstruction and/or weight-based dosing when appropriate to reduce radiation doses  as low as reasonably achievable.    CT scan of the cervical spine    Clinical history is FALL    Axial images the cervical spine were obtained with sagittal and coronal reconstructed images. There is reversal of normal cervical lordosis. There is been instrumented anterior cervical fusion at C4-5 with cortical plate and fixating screws. There is interbody disc expander at C4-5 with bony bridging.    There is bony fusion at C5-6 and C6-7. There is mild anterolisthesis of C7 on T1 with disc space narrowing. There has been posterior fusion on the right at C6-7 with bony fusion of the right facet. There is bony fusion of the right facet at C4-5 and C5-6 that of the left facet at C4-5.    There is disc space narrowing and anterior spurring at C2-3. The odontoid process is intact and the lateral masses of C1 are symmetrical.    There are no fractures or acute osseous abnormalities. The facet joints are aligned. The paraspinous soft tissues are normal. There is no epidural fluid collection.    The lung apices are clear.    IMPRESSION: No evidence of fracture or acute osseous abnormality    Prior anterior and posterior fusion    Mild anterolisthesis of C7  on T1 with disc space narrowing    Degenerative changes at C2-3    Electronically signed by:  Meredith Colunga MD  8/27/2022 9:54 AM CDT Workstation: [id number]                                     CT Head Without Contrast (Final result)  Result time 08/27/22 09:50:41      Final result by Meredith Colunga MD (08/27/22 09:50:41)                   Narrative:    All CT scans at this facility used dose modulation, iterative reconstruction and/or weight-based dosing when appropriate to reduce radiation doses  as low as reasonably achievable.    CLINICAL INFORMATION:  FALL    FINDINGS:   The ventricles and sulci are normal in size and configuration for age.  There is no intraparenchymal hemorrhage, mass or midline shift.  There are no extra-axial fluid collections.  The paranasal sinus and mastoid air cells are clear.    IMPRESSION:   NO ACUTE INTRACRANIAL PROCESS.    Electronically signed by:  Meredith Colunga MD  8/27/2022 9:50 AM CDT Workstation: [id number]                                     Medications   Tdap vaccine injection 0.5 mL (0.5 mLs Intramuscular Given 8/27/22 1101)   LIDOcaine-EPINEPHrine 1%-1:100,000 injection 10 mL (10 mLs Intradermal Given 8/27/22 1101)   acetaminophen tablet 500 mg (500 mg Oral Given 8/27/22 1101)     Medical Decision Making:   History:   Old Records Summarized: records from previous admission(s).  Initial Assessment:   72-year-old female presents emergency department past medical history includes end-stage renal disease currently on hemodialysis, hypertension, hypothyroidism, diabetes.  Patient currently lives in of daily a she states that she has planting tomatoes she reports that she was going outside this morning to see the tomatoes however there were chairs in front of the patio door she attempted to move to chairs that were connected together and 1 of the chairs got stuck on something on the floor causing her to fall backwards she did strike her head however she denies LOC  she does not take any blood thinners.  Patient also sustained a small abrasion to the left elbow patient has no further complaints.     Differential Diagnosis:   Considerations include closed head injury, skull fracture, intracranial hemorrhage, cervical fracture  ED Management:  72-year-old female presents emergency department status post mechanical fall sustained a laceration to the occipital aspect of the scalp denies LOC is not on any blood thinners CT imaging performed of the head and neck no acute traumatic findings noted patient remained and she has no further complaints C-collar was removed laceration was repaired with staples tetanus status in the computer dated 2014 therefore was updated today.  Have discussed head injury precautions with the patient she will be discharged home with return precautions                    Clinical Impression:   Final diagnoses:  [W19.XXXA] Fall  [S01.01XA] Scalp laceration, initial encounter (Primary)  [S09.90XA] Injury of head, initial encounter  [S13.9XXA] Cervical sprain, initial encounter      ED Disposition Condition    Discharge Stable          ED Prescriptions    None       Follow-up Information       Follow up With Specialties Details Why Contact Info    April Horton MD Internal Medicine Schedule an appointment as soon as possible for a visit in 3 days  [address] [facility] [address]  [phone]               Kaylee Ontiveros, ANDRÉS  08/27/22 1112

## 2022-08-27 NOTE — ED NOTES
Pt complains of head hurting after slipping and falling, denies loc. Pt does have small laceration to back of head

## 2022-08-30 NOTE — ANESTHESIA PREPROCEDURE EVALUATION
08/30/2022  Shara Hutchins is a 72 y.o., female.      Pre-op Assessment    I have reviewed the Patient Summary Reports.     I have reviewed the Nursing Notes. I have reviewed the NPO Status.   I have reviewed the Medications.     Review of Systems  Cardiovascular:   Hypertension    Pulmonary:   Pneumonia COPD    Renal/:   Chronic Renal Disease    Neurological:   Neuromuscular Disease,    Endocrine:   Diabetes, type 2 Hypothyroidism        Physical Exam  General: Well nourished, Cooperative, Alert and Oriented    Airway:  Mallampati: II / II  Mouth Opening: Normal  TM Distance: 4 - 6 cm  Tongue: Normal    Dental:  Intact    Chest/Lungs:  Clear to auscultation, Normal Respiratory Rate    Heart:  Rate: Normal  Rhythm: Regular Rhythm  Sounds: Normal        Anesthesia Plan  Type of Anesthesia, risks & benefits discussed:    Anesthesia Type: Gen Natural Airway  Intra-op Monitoring Plan: Standard ASA Monitors  Induction:  IV  Informed Consent: Informed consent signed with the Patient and all parties understand the risks and agree with anesthesia plan.  All questions answered.   ASA Score: 3    Ready For Surgery From Anesthesia Perspective.     .

## 2022-08-30 NOTE — PLAN OF CARE
Pt transferred to phase II. VSS, pain tolerable after pain meds given, pt on dialysis, tolerated clear liquids without N/V. Report given to CHERRY Chand.

## 2022-08-30 NOTE — ANESTHESIA POSTPROCEDURE EVALUATION
Anesthesia Post Evaluation    Patient: Shara Hutchins    Procedure(s) Performed: Procedure(s) (LRB):  REMOVAL, CALCULUS, URETER, URETEROSCOPIC (Right)  CYSTOURETEROSCOPY, WITH RETROGRADE PYELOGRAM AND URETERAL STENT INSERTION (Right)  REMOVAL-STENT (Right)    Final Anesthesia Type: general      Patient location during evaluation: PACU  Patient participation: Yes- Able to Participate  Level of consciousness: awake and alert and oriented  Post-procedure vital signs: reviewed and stable  Pain management: adequate  Airway patency: patent    PONV status at discharge: No PONV  Anesthetic complications: no      Cardiovascular status: blood pressure returned to baseline  Respiratory status: unassisted, spontaneous ventilation and room air  Hydration status: euvolemic  Follow-up not needed.          Vitals Value Taken Time   /56 08/30/22 1145   Temp  08/30/22 1145   Pulse 70 08/30/22 1145   Resp 22 08/30/22 1145   SpO2 100 % 08/30/22 1145   Vitals shown include unvalidated device data.      No case tracking events are documented in the log.      Pain/Kvng Score: Pain Rating Prior to Med Admin: 6 (8/30/2022 11:22 AM)  Pain Rating Post Med Admin: 4 (8/30/2022 11:35 AM)  Kvng Score: 10 (8/30/2022 11:35 AM)

## 2022-08-30 NOTE — OP NOTE
[facility]  Operative/Discharge Note    Date: 08/30/2022    Pre-Op Diagnosis: Right emphysematous pyelitis    Post-Op Diagnosis: Same    Procedure(s) Performed:   Cystourethroscopy  Right ureteroscopy with basket extraction of stone fragments  Right retrograde pyelogram  Right ureteral stent placement, 7 x 26 JJ stent    Flouro <1 hour    Specimen(s): None    Staff Surgeon: Damari Barber Jr, MD    Anesthesia: General    Indications: Shara Hutchins is a 72 y.o. female with right nephrolithiasis, right ureteral stricture and recurrent urinary tract infection.     Findings:   Several stones extracted from the right mid ureter. Several attempts were made to access the proximal ureter and kidney, but there was significant narrowing that made passage extremely difficult. Retrograde pyelogram with persistent moderate right hydronephrosis. Stent replaced - 7 x 26 cm.     Estimated Blood Loss: Minimal    Drains: 7 x 26 cm JJ stent    Complications: None    Implants:   Implant Name Type Inv. Item Serial No.  Lot No. LRB No. Used Action   STENT URETERAL UNIV 7FR 26CM - [id number]  STENT URETERAL UNIV 7FR 26CM  [employer]. [id number] Right 1 Implanted       Procedure in detail:  After informed consent was obtained, the patient was brought the the cystoscopy suite and placed in the supine position.  SCDs were applied and working.  GETA was administered.  The patient was then placed in the dorsal lithotomy position and prepped and draped in the usual sterile fashion.      A rigid cystoscope in a 22 Fr sheath was introduced into the patient's urethra.  This passed easily.  The entire urethra was visualized which showed no strictures or masses.  The UOs were visualized in the normal anatomic position bilaterally and clear efflux was visualized.  The rightUO had mild  inflammation from the indwelling stent.     A motion wire was passed up the rightUO and up into the kidney.  This passed easily and placement  was confirmed using fluoro.  The cystoscope was removed keeping the guidewire in place.  The cystoscope was reintroduced and the stent was grasped using a grasper and removed.    An 8 Fr rigid ureteroscope was passed into the patient's bladder alongside the wire under direct vision.  It was then passed through the right UO alongside the wire.  There were several stones that were extracted using the Ncircle basket from the mid-ureter.     A wire was advanced up the ureteroscope and ureteroscope was removed leaving wires in place. A 35 cm ureteral access sheath was placed over the second wire under fluoroscopic guidance. However, there was significant difficulty once again with passing this into the proximal ureter and kidney. After several attempts to obtain access proximally, the access sheath was removed.     A retrograde was performed. There was moderate hydronephrosis.    A cystoscope was reinserted and the bladder was irrigated to remove the stone fragments.  The bladder was drained the cystoscope removed keeping the wire in place.  The wire was backloaded through the cystoscope using a pusher.    A 7x26 JJ ureteral stent without strings was passed over the wire and up into the renal pelvis using fluoro.  When the coil appeared to be in good position in the kidney and the radio-opaque marker of the pusher was at the inferior pubis, the wire was removed under continuous fluoro.  Good coils were seen in the kidney and the bladder using fluoro.      The patient tolerated the procedure well and was transferred to the recovery room in stable condition.      Disposition: Home    Discharge home today status post uncomplicated procedure as above  Diet - resume home diet  Follow up: Patient has ESRD on dialysis and continues to get recurrent stones with urinary tract infection in the setting of a mid-ureteral stricture. Attempts have been made to manage without a stent, but she gets recurrent infections. Attempts to  remain with a stent have also resulted in encrustation and recurrent infections. RTC in 6 weeks to discuss long-term management. May benefit from a nephrostomy tube with 3 month exchanges as she is on dialysis and likely not a good candidate for nephrectomy.   Instructions: Home with Norco and Doxycycline.   Meds:     Medication List        START taking these medications      doxycycline 100 MG Cap  Commonly known as: VIBRAMYCIN  Take 1 capsule (100 mg total) by mouth every 12 (twelve) hours. for 3 days     HYDROcodone-acetaminophen 5-325 mg per tablet  Commonly known as: NORCO  Take 1 tablet by mouth every 6 (six) hours as needed for Pain.            CHANGE how you take these medications      levothyroxine 88 MCG tablet  Commonly known as: SYNTHROID  TAKE 1 TABLET BEFORE BREAKFAST  What changed:   how much to take  how to take this  when to take this            CONTINUE taking these medications      AURYXIA 210 mg iron Tab  Generic drug: ferric citrate     cholecalciferol (vitamin D3) 25 mcg (1,000 unit) capsule  Commonly known as: VITAMIN D3  Take 1 capsule (1,000 Units total) by mouth once daily.     diltiaZEM 120 MG Cp24  Commonly known as: CARDIZEM CD  Take 1 capsule (120 mg total) by mouth once daily.     gabapentin 100 MG capsule  Commonly known as: NEURONTIN     hydrALAZINE 50 MG tablet  Commonly known as: APRESOLINE  Take 1 tablet (50 mg total) by mouth every 8 (eight) hours.     LORazepam 0.5 MG tablet  Commonly known as: ATIVAN  Take 1 tablet (0.5 mg total) by mouth every evening.     promethazine 25 MG tablet  Commonly known as: PHENERGAN  TAKE 1 TABLET (25 MG TOTAL) BY MOUTH 2 (TWO) TIMES DAILY AS NEEDED FOR NAUSEA.     rOPINIRole 0.5 MG tablet  Commonly known as: REQUIP     sertraline 50 MG tablet  Commonly known as: ZOLOFT  Take 1 tablet (50 mg total) by mouth every evening. Take 50 mg by mouth every evening.     traMADoL 50 mg tablet  Commonly known as: ULTRAM  Take 1 tablet (50 mg total) by mouth  every 6 (six) hours as needed.     zinc gluconate 50 mg tablet            STOP taking these medications      meropenem 500 mg injection  Commonly known as: MERREM     meropenem-0.9% sodium chloride 500 mg/50 mL Pgbk     miconazole 2 % cream  Commonly known as: MICOTIN               Where to Get Your Medications        These medications were sent to [id number] - [place] - [address]  [address] [address]      Hours: 24-hours Phone: [phone]   doxycycline 100 MG Cap  HYDROcodone-acetaminophen 5-325 mg per tablet         Damari Barber Jr, MD

## 2022-08-30 NOTE — DISCHARGE INSTRUCTIONS
"Dr Barber instructions:    Discharge home today status post uncomplicated procedure as above  Diet - resume home diet  Follow up:  RTC in 6 weeks to discuss long-term management.   Instructions: Home with Norco and Doxycycline    Discharge Instructions: After Your Surgery/Procedure  Youve just had surgery. During surgery you were given medicine called anesthesia to keep you relaxed and free of pain. After surgery you may have some pain or nausea. This is common. Here are some tips for feeling better and getting well after surgery.     Stay on schedule with your medication.   Going home  Your doctor or nurse will show you how to take care of yourself when you go home. He or she will also answer your questions. Have an adult family member or friend drive you home.      For your safety we recommend these precaution for the first 24 hours after your procedure:  Do not drive or use heavy equipment.  Do not make important decisions or sign legal papers.  Do not drink alcohol.  Have someone stay with you, if needed. He or she can watch for problems and help keep you safe.  Your concentration, balance, coordination, and judgement may be impaired for many hours after anesthesia.  Use caution when ambulating or standing up.     You may feel weak and "washed out" after anesthesia and surgery.      Subtle residual effects of general anesthesia or sedation with regional / local anesthesia can last more than 24 hours.  Rest for the remainder of the day or longer if your Doctor/Surgeon has advised you to do so.  Although you may feel normal within the first 24 hours, your reflexes and mental ability may be impaired without you realizing it.  You may feel dizzy, lightheaded or sleepy for 24 hours or longer.      Be sure to go to all follow-up visits with your doctor. And rest after your surgery for as long as your doctor tells you to.  Coping with pain  If you have pain after surgery, pain medicine will help you feel better. Take " it as told, before pain becomes severe. Also, ask your doctor or pharmacist about other ways to control pain. This might be with heat, ice, or relaxation. And follow any other instructions your surgeon or nurse gives you.  Tips for taking pain medicine  To get the best relief possible, remember these points:  Pain medicines can upset your stomach. Taking them with a little food may help.  Most pain relievers taken by mouth need at least 20 to 30 minutes to start to work.  Taking medicine on a schedule can help you remember to take it. Try to time your medicine so that you can take it before starting an activity. This might be before you get dressed, go for a walk, or sit down for dinner.  Constipation is a common side effect of pain medicines. Call your doctor before taking any medicines such as laxatives or stool softeners to help ease constipation. Also ask if you should skip any foods. Drinking lots of fluids and eating foods such as fruits and vegetables that are high in fiber can also help. Remember, do not take laxatives unless your surgeon has prescribed them.  Drinking alcohol and taking pain medicine can cause dizziness and slow your breathing. It can even be deadly. Do not drink alcohol while taking pain medicine.  Pain medicine can make you react more slowly to things. Do not drive or run machinery while taking pain medicine.  Your health care provider may tell you to take acetaminophen to help ease your pain. Ask him or her how much you are supposed to take each day. Acetaminophen or other pain relievers may interact with your prescription medicines or other over-the-counter (OTC) drugs. Some prescription medicines have acetaminophen and other ingredients. Using both prescription and OTC acetaminophen for pain can cause you to overdose. Read the labels on your OTC medicines with care. This will help you to clearly know the list of ingredients, how much to take, and any warnings. It may also help you not  take too much acetaminophen. If you have questions or do not understand the information, ask your pharmacist or health care provider to explain it to you before you take the OTC medicine.  Managing nausea  Some people have an upset stomach after surgery. This is often because of anesthesia, pain, or pain medicine, or the stress of surgery. These tips will help you handle nausea and eat healthy foods as you get better. If you were on a special food plan before surgery, ask your doctor if you should follow it while you get better. These tips may help:  Do not push yourself to eat. Your body will tell you when to eat and how much.  Start off with clear liquids and soup. They are easier to digest.  Next try semi-solid foods, such as mashed potatoes, applesauce, and gelatin, as you feel ready.  Slowly move to solid foods. Dont eat fatty, rich, or spicy foods at first.  Do not force yourself to have 3 large meals a day. Instead eat smaller amounts more often.  Take pain medicines with a small amount of solid food, such as crackers or toast, to avoid nausea.     Call your surgeon if  You still have pain an hour after taking medicine. The medicine may not be strong enough.  You feel too sleepy, dizzy, or groggy. The medicine may be too strong.  You have side effects like nausea, vomiting, or skin changes, such as rash, itching, or hives.       If you have obstructive sleep apnea  You were given anesthesia medicine during surgery to keep you comfortable and free of pain. After surgery, you may have more apnea spells because of this medicine and other medicines you were given. The spells may last longer than usual.   At home:  Keep using the continuous positive airway pressure (CPAP) device when you sleep. Unless your health care provider tells you not to, use it when you sleep, day or night. CPAP is a common device used to treat obstructive sleep apnea.  Talk with your provider before taking any pain medicine, muscle  relaxants, or sedatives. Your provider will tell you about the possible dangers of taking these medicines.  © 1656-7891 [employer]. [address] [place] [address], [place] [address]. All rights reserved. This information is not intended as a substitute for professional medical care. Always follow your healthcare professional's instructions.       Using an Incentive Spirometer    An incentive spirometer is a device that helps you do deep breathing exercises. These exercises expand your lungs, aid in circulation, and help prevent pneumonia. Deep breathing exercises also help you breathe better and improve the function of your lungs by:  Keeping your lungs clear  Strengthening your breathing muscles  Helping prevent respiratory complications or problems  The incentive spirometer gives you a way to take an active part in recover. A nurse or therapist will teach you breathing exercises. To do these exercises, you will breathe in through your mouth and not your nose. The incentive spirometer only works correctly if you breathe in through your mouth.  Steps to clear lungs  Step 1. Exhale normally. Then, inhale normally.  Relax and breathe out.  Step 2. Place your lips tightly around the mouthpiece.  Make sure the device is upright and not tilted.  Step 3. Inhale as much air as you can through the mouthpiece (don't breath through your nose).  Inhale slowly and deeply.  Hold your breath long enough to keep the balls or disk raised for at least 3 to 5 seconds, or as instructed by your healthcare provider.  Some spirometers have an indicator to let you know that you are breathing in too fast. If the indicator goes off, breathe in more slowly.  Step 4. Repeat the exercise regularly.  Do this exercise every hour while you're awake, or as instructed by your healthcare provider.  If you were taught deep breathing and coughing exercises, do them regularly as instructed by your healthcare provider.            Post op  instructions for prevention of DVT  What is deep vein thrombosis?  Deep vein thrombosis (DVT) is the medical term for blood clots in the deep veins of the leg.  These blood clots can be dangerous.  A DVT can block a blood vessel and keep blood from getting where it needs to go.  Another problem is that the clot can travel to other parts of the body such as the lungs.  A clot that travels to the lungs is called a pulmonary embolus (PE) and can cause serious problems with breathing which can lead to death.  Am I at risk for DVT/PE?  If you are not very active, you are at risk of DVT.  Anyone confined to bed, sitting for long periods of time, recovering from surgery, etc. increases the risk of DVT.  Other risk factors are cancer diagnosis, certain medications, estrogen replacement in any form,older age, obesity, pregnancy, smoking, history of clotting disorders, and dehydration.  How will I know if I have a DVT?  Swelling in the lower leg  Pain  Warmth, redness, hardness or bulging of the vein  If you have any of these symptoms, call your doctors office right away.  Some people will not have any symptoms until the clot moves to the lungs.  What are the symptoms of a PE?  Panting, shortness of breath, or trouble breathing  Sharp, knife-like chest pain when you breathe  Coughing or coughing up blood  Rapid heartbeat  If you have any of these symptoms or get worse quickly, call 911 for emergency treatment.  How can I prevent a DVT?  Avoid long periods of inactivity and dont cross your legs--get up and walk around every hour or so.  Stay active--walking after surgery is highly encouraged.  This means you should get out of the house and walk in the neighborhood.  Going up and down stairs will not impair healing (unless advised against such activity by your doctor).    Drink plenty of noncaffeinated, nonalcoholic fluids each day to prevent dehydration.  Wear special support stockings, if they have been advised by your  doctor.  If you travel, stop at least once an hour and walk around.  Avoid smoking (assistance with stopping is available through your healthcare provider)  Always notify your doctor if you are not able to follow the post operative instructions that are given to you at the time of discharge.  It may be necessary to prescribe one of the medications available to prevent DVT.     We hope your stay was comfortable as you heal now, mend and rest.    For we have enjoyed taking care of you by giving your our best.    And as you get better, by regaining your health and strength;   We count it as a privilege to have served you and hope your time at [facility] was well spent.      Thank  You!!!

## 2022-08-30 NOTE — TRANSFER OF CARE
"Anesthesia Transfer of Care Note    Patient: Shara Hutchins    Procedure(s) Performed: Procedure(s) (LRB):  REMOVAL, CALCULUS, URETER, URETEROSCOPIC (Right)  CYSTOURETEROSCOPY, WITH RETROGRADE PYELOGRAM AND URETERAL STENT INSERTION (Right)  REMOVAL-STENT (Right)    Patient location: PACU    Anesthesia Type: general    Transport from OR: Transported from OR on 6-10 L/min O2 by face mask with adequate spontaneous ventilation    Post pain: adequate analgesia    Post assessment: no apparent anesthetic complications and tolerated procedure well    Post vital signs: stable    Level of consciousness: sedated    Nausea/Vomiting: no nausea/vomiting    Complications: none    Transfer of care protocol was followed      Last vitals:   Visit Vitals  BP (!) 120/58 (BP Location: Right arm, Patient Position: Sitting)   Pulse 92   Temp 37.1 °C (98.8 °F) (Oral)   Resp 16   Ht 5' 7" (1.702 m)   Wt 69.4 kg (153 lb)   SpO2 98%   Breastfeeding No   BMI 23.96 kg/m²     "

## 2022-08-30 NOTE — ANESTHESIA PROCEDURE NOTES
Intubation    Date/Time: 8/30/2022 9:39 AM  Performed by: Clifford Julian CRNA  Authorized by: Craig Carolina MD     Intubation:     Attempted By:  CRNA    Difficult Airway Encountered?: No      Complications:  None    Airway Device:  Supraglottic airway/LMA    Airway Device Size:  3.0

## 2022-08-30 NOTE — PLAN OF CARE
Vss procedure verified with garima Purdy from [facility] to be responsible party upon discharge.#[phone]

## 2022-09-06 NOTE — PROGRESS NOTES
Interval History:   Patient complains of loss of significant pain in the lower abdomen consistent with bladder spasms.  Continues bladder irrigation is in progress at this time apparently still flushing out some clots    Review of Systems     Denies Chest pain, sob, or palpitations  No recent fever, cough chills or congestion  No blood in the urine or stool  No myalgias  No recent arm, neck, or jaw pain  No lightheadedness or dizziness  No Double vision, blurry, vision or headache     Objective:     Vital Signs (Most Recent):  Temp: 98.5 °F (36.9 °C) (03/25/20 0707)  Pulse: 85 (03/25/20 0746)  Resp: 18 (03/25/20 0746)  BP: (!) 87/54 (03/25/20 0707)  SpO2: 99 % (03/25/20 0746) Vital Signs (24h Range):  Temp:  [98.3 °F (36.8 °C)-98.6 °F (37 °C)] 98.5 °F (36.9 °C)  Pulse:  [72-93] 85  Resp:  [18-20] 18  SpO2:  [95 %-100 %] 99 %  BP: ()/(51-56) 87/54     Weight: 59.6 kg (131 lb 6.3 oz)  Body mass index is 20.28 kg/m².     SpO2: 99 %  O2 Device (Oxygen Therapy): nasal cannula      Intake/Output Summary (Last 24 hours) at 3/25/2020 1020  Last data filed at 3/25/2020 0932  Gross per 24 hour   Intake 67266 ml   Output 00354 ml   Net -6480 ml       Lines/Drains/Airways     Peripherally Inserted Central Catheter Line            PICC Double Lumen 03/06/20 1154 other (see comments) 18 days          Drain                 Hemodialysis AV Fistula Left upper arm -- days         Ileostomy 02/13/20 1600 Loop RLQ 40 days         Urethral Catheter 03/16/20 2230 Straight-tip 18 Fr. 8 days                   azithromycin  500 mg Oral Daily    brimonidine 0.15 % OPTH DROP  1 drop Both Eyes TID    ceFEPime (MAXIPIME) IVPB  1 g Intravenous Q12H    clotrimazole  10 mg Oral QID    diphenoxylate-atropine 2.5-0.025 mg  1 tablet Oral QID    dronabinoL  2.5 mg Oral BID AC    epoetin nica-ebpx (RETACRIT) injection  100 Units/kg Subcutaneous Q7 Days    fluticasone propionate  2 spray Each Nostril Daily    levothyroxine  88 mcg Oral  Would need to schedule appointment.    Before breakfast    magnesium oxide  400 mg Oral BID    pantoprazole  40 mg Oral BID    psyllium husk (with sugar)  2 packet Oral TID    sertraline  50 mg Oral Daily    sodium chloride  1 spray Each Nostril Daily         PHYSICAL EXAM:  Constitutional: Well built, thinly built in mild distress   Neck: no carotid bruit, no JVD  Lungs: CTA  Chest Wall: no tenderness internal catheter in the right chest, mild erythema is noted in the signed  Heart: regular rate and rhythm, S1, S2 normal, no murmur, click, rub or gallop   Abdomen: soft, non-tender; bowel sounds normal; no masses,  no organomegaly  Extremities: Extremities normal, no edema  Neuro: AAO X 3    I HAVE REVIEWED :    The vital signs, nurses notes, and all the pertinent radiology and labs.       Significant Labs: Results for UMAIR CHA (MRN [id number]) as of 3/25/2020 10:20   Ref. Range 3/24/2020 04:32 3/24/2020 13:43 3/25/2020 05:05 3/25/2020 08:41   WBC Latest Ref Range: 3.90 - 12.70 K/uL 9.45  11.86    RBC Latest Ref Range: 4.00 - 5.40 M/uL 2.78 (L)  2.74 (L)    Hemoglobin Latest Ref Range: 12.0 - 16.0 g/dL 8.4 (L)  8.3 (L)    Hematocrit Latest Ref Range: 37.0 - 48.5 % 26.5 (L)  26.5 (L)    MCV Latest Ref Range: 82 - 98 fL 95  97    MCH Latest Ref Range: 27.0 - 31.0 pg 30.2  30.3    MCHC Latest Ref Range: 32.0 - 36.0 g/dL 31.7 (L)  31.3 (L)    RDW Latest Ref Range: 11.5 - 14.5 % 16.6 (H)  15.9 (H)    Platelets Latest Ref Range: 150 - 350 K/uL 529 (H)  523 (H)    MPV Latest Ref Range: 9.2 - 12.9 fL 10.9  10.6    Sodium Latest Ref Range: 136 - 145 mmol/L 138  137    Potassium Latest Ref Range: 3.5 - 5.1 mmol/L 4.5  4.6    Chloride Latest Ref Range: 95 - 110 mmol/L 101  100    CO2 Latest Ref Range: 23 - 29 mmol/L 26  27    Anion Gap Latest Ref Range: 8 - 16 mmol/L 11  10    BUN, Bld Latest Ref Range: 8 - 23 mg/dL 35 (H)  33 (H)    Creatinine Latest Ref Range: 0.5 - 1.4 mg/dL 2.1 (H)  2.2 (H)    eGFR if non African American Latest Ref Range: >60 mL/min/1.73  m^2 23.5 (A)  22.2 (A)    eGFR if African American Latest Ref Range: >60 mL/min/1.73 m^2 27.1 (A)  25.6 (A)    Glucose Latest Ref Range: 70 - 110 mg/dL 81  85    Calcium Latest Ref Range: 8.7 - 10.5 mg/dL 8.7  8.9    Phosphorus Latest Ref Range: 2.7 - 4.5 mg/dL 5.6 (H)  4.8 (H)    Magnesium Latest Ref Range: 1.6 - 2.6 mg/dL 1.6  1.5 (L)    Alkaline Phosphatase Latest Ref Range: 55 - 135 U/L 310 (H)  323 (H)    PROTEIN TOTAL Latest Ref Range: 6.0 - 8.4 g/dL 5.6 (L)  6.0    Albumin Latest Ref Range: 3.5 - 5.2 g/dL 2.3 (L)  2.5 (L)    BILIRUBIN TOTAL Latest Ref Range: 0.1 - 1.0 mg/dL 1.1 (H)  0.8    AST Latest Ref Range: 10 - 40 U/L 36  35    ALT Latest Ref Range: 10 - 44 U/L 22  22        Significant Imaging:   PACS Images for [facility] Viewer      Show images for X-Ray Chest AP Portable   X-Ray Chest AP Portable   Order: [id number]   Status:  Final result   Visible to patient:  No (Not Released) Next appt:  04/01/2020 at 02:20 PM in Chemotherapy (INJECTION CHAIR, [facility])   Details     Reading Physician Reading Date Result Priority   Dixon Thomas MD 3/25/2020 Routine      Narrative     EXAMINATION:  XR CHEST AP PORTABLE    CLINICAL HISTORY:  Shortness of breath    COMPARISON:  March 20, 2020    FINDINGS:  Heart size is normal.  There is mild aortic atherosclerosis.  Right-sided central venous catheter tip is at the superior vena cava.    Previously noted diffuse interstitial and multifocal ground-glass pulmonary opacities appear much improved.  Previously demonstrated small left pleural effusion is no longer identified.  There is no pneumothorax.  Osseous structures are intact.      Impression       1. Marked interval improvement when compared to March 20, with near complete resolution of bilateral interstitial and ground-glass alveolar opacities.  Tiny left pleural effusion is no longer identified.      Electronically signed by: Dixon Thomas MD  Date: 03/25/2020  Time: 08:46               I HAVE  REVIEWED :    The vital signs, nurses notes, and all the pertinent radiology and labs.       ASSESSMENT & PLAN:     1. SOB and Hypoxemia significantly improved  2. Elevated BNP clinically improved  3. CXR show hypervolemia atypical pneumonia or developing ARDs on yesterday  4. Anemia and hematuria  5. Klebsiella Pneumoniae to urine  6. DARCI on CKD Stage III now with our elevated creatinine  7. Unstageable pressure ulcer  8. Inflammatory Bowel Disease  9. Thyroid Dysfunction  10. S/P Ileostomy  11. H/O Vulvar Cancer   12. Hypokalemia  13. Low Grade Fever  14. Grade 1 Diastolic Dysfunction, Normal LVEF 60%  15. Mild-Moderate PH PA Pressure 47   16.  Acute decompensation of chronic congestive heart failure with diastolic dysfunction, ejection fraction of 60%, now improved  Seventeen.  Persistent hematuria    RECOMMENDATIONS:  At this point patient appears to be volume depleted.  Diuretics have been on hold Consider  gradual cautious IV hydration.  Hemoglobin has remained stable continues bladder irrigation has been initiated.  She still has significant bladder spasms  As patient had prior radiation therapy to her pelvic area she is not a good candidate for his suppositories.  So pain management with oral and intravenous medications appropriate.  Will recheck labs in the morning.  I have personally interviewed and examined the patient, and made the above recommendations.

## 2022-09-07 PROBLEM — Z93.2 S/P ILEOSTOMY: Status: RESOLVED | Noted: 2020-02-13 | Resolved: 2022-01-01

## 2022-09-07 PROBLEM — K55.9 ISCHEMIC COLITIS: Status: ACTIVE | Noted: 2022-01-01

## 2022-09-07 NOTE — PROGRESS NOTES
Pharmacist Renal Dose Adjustment Note    Shara Hutchins is a 72 y.o. female being treated with the medication meropenem    Patient Data:    Vital Signs (Most Recent):  Temp: (!) 100.5 °F (38.1 °C) (09/07/22 0857)  Pulse: 84 (09/07/22 1202)  Resp: 19 (09/07/22 1202)  BP: 93/64 (09/07/22 1202)  SpO2: (!) 93 % (09/07/22 1202)   Vital Signs (72h Range):  Temp:  [100.5 °F (38.1 °C)]   Pulse:  [83-97]   Resp:  [17-21]   BP: ()/(53-70)   SpO2:  [91 %-94 %]      Recent Labs   Lab 09/07/22  1010   CREATININE 5.5*     Serum creatinine: 5.5 mg/dL (H) 09/07/22 1010  Estimated creatinine clearance: 9 mL/min (A)    Medication:meropenem 500 mg IV q 12 hours will be changed to 500 mg IV  24 hours for crcl < 10 ml/min per hospital renal dosing protocol.     Pharmacist's Name: Raúl Hill  Pharmacist's Extension: [phone]

## 2022-09-07 NOTE — HPI
72 year old female with a history of ESRD on HD MWF, HTN, hypothyroidism, and chronic diarrhea s/p iliostomy and reversal 2 year back came with abdominal pain .  Started on Friday and complaining of , nausea, vomiting.  Initially she was having only diarrhea but later today she saw blood clots along with stool and came to the hospital.  She is on dialysis and she got dialysis yesterday. Patient has had decreased p.o. intake for the last few days almost not eating at all.  CT of the abdomen  pelvis  was done and found Portal venous air with pneumatosis within the descending colon and sigmoid colon with diffuse wall thickening of the colon. Findings are suggestive of ischemic colitis.  Patient was given IV fluid and IV potassium supplement in the ER.  She is having abdominal pain but not in chest pain or shortness of breath and she is awake alert oriented.  On examination patient abdomen is soft but tender at lower abdomen .  ED physician discussed with surgeon and they will bring her to OR.  Of note there is no free air.

## 2022-09-07 NOTE — ANESTHESIA PREPROCEDURE EVALUATION
09/07/2022  Shara Hutchins is a 72 y.o., female.  Patient Active Problem List   Diagnosis    Vitamin D deficiency    Hisotry of Vulvar cancer    Anemia of decreased vitamin B12 absorption    Anemia due to stage 5 chronic kidney disease treated with erythropoietin    Hypothyroidism    Anemia of chronic disease    Nausea & vomiting    Chronic diarrhea    S/P ileostomy    DARCI (acute kidney injury)    Severe dehydration due to chronic diarrhea/high output ileostomy    Electrolyte imbalance    Severe malnutrition    History of difficult venous access    Malnutrition    Acute hypoxemic respiratory failure    Status post reversal of ileostomy    Chronic diarrhea    Mild protein-energy malnutrition    Perineal rash, female    Perianal cellulitis    Pneumonia due to infectious organism    Right hydroureter secondary to obstructing calculi    Right-sided pyelonephritis    ESRD on dialysis MWF    Macrocytic anemia    Constipation    Bilateral LE neuropathy    Essential hypertension    Nephrolithiasis    Sepsis    Hypomagnesemia    Acute on chronic anemia    Vulvar cellulitis    Lymphedema due to radiation    Urinary incontinence    S/P ureteral stent placement    Thrombocytopenia    Secondary hyperparathyroidism of renal origin    Stenosis of other vascular prosthetic devices, implants and grafts, initial encounter    History of cancer of vulva    Vitamin B12 deficiency    Chest pain    Emphysema lung    Ureterolithiasis    COVID    Ischemic colitis                                                                                                                  09/07/2022  Shara Hutchins is a 72 y.o., female   Patient Active Problem List   Diagnosis    Vitamin D deficiency    Hisotry of Vulvar cancer    Anemia of decreased vitamin B12 absorption    Anemia due to stage 5  chronic kidney disease treated with erythropoietin    Hypothyroidism    Anemia of chronic disease    Nausea & vomiting    Chronic diarrhea    S/P ileostomy    DARCI (acute kidney injury)    Severe dehydration due to chronic diarrhea/high output ileostomy    Electrolyte imbalance    Severe malnutrition    History of difficult venous access    Malnutrition    Acute hypoxemic respiratory failure    Status post reversal of ileostomy    Chronic diarrhea    Mild protein-energy malnutrition    Perineal rash, female    Perianal cellulitis    Pneumonia due to infectious organism    Right hydroureter secondary to obstructing calculi    Right-sided pyelonephritis    ESRD on dialysis MWF    Macrocytic anemia    Constipation    Bilateral LE neuropathy    Essential hypertension    Nephrolithiasis    Sepsis    Hypomagnesemia    Acute on chronic anemia    Vulvar cellulitis    Lymphedema due to radiation    Urinary incontinence    S/P ureteral stent placement    Thrombocytopenia    Secondary hyperparathyroidism of renal origin    Stenosis of other vascular prosthetic devices, implants and grafts, initial encounter    History of cancer of vulva    Vitamin B12 deficiency    Chest pain    Emphysema lung    Ureterolithiasis    COVID    Ischemic colitis       Past Surgical History:   Procedure Laterality Date    ABDOMINAL SURGERY      ANGIOGRAPHY OF ARTERIOVENOUS SHUNT N/A 01/19/2022    Procedure: Fistulogram with Possible Intervention;  Surgeon: Raz Maher MD;  Location: [facility] CATH/EP LAB;  Service: Cardiothoracic;  Laterality: N/A;    APPENDECTOMY      AV FISTULA PLACEMENT Left 2018    BACK SURGERY      BLADDER FULGURATION N/A 06/16/2020    Procedure: FULGURATION, BLADDER;  Surgeon: Damari Barber Jr., MD;  Location: [facility] OR;  Service: Urology;  Laterality: N/A;    carpel tunnel surgery once on each hand      CERVICAL FUSION      x 4    cervix repair      CHOLECYSTECTOMY  2000     COLECTOMY      CYSTOSCOPY W/ RETROGRADES  06/16/2020    Procedure: CYSTOSCOPY, WITH RETROGRADE PYELOGRAM;  Surgeon: Damari Barber Jr., MD;  Location: [facility] OR;  Service: Urology;;    CYSTOSCOPY W/ RETROGRADES Right 04/20/2021    Procedure: CYSTOSCOPY, WITH RETROGRADE PYELOGRAM;  Surgeon: Damari Barber Jr., MD;  Location: [facility] OR;  Service: Urology;  Laterality: Right;    CYSTOSCOPY W/ URETERAL STENT PLACEMENT Right 03/16/2021    Procedure: CYSTOSCOPY, WITH URETERAL STENT INSERTION;  Surgeon: Damari Barber Jr., MD;  Location: [facility] OR;  Service: Urology;  Laterality: Right;    CYSTOSCOPY W/ URETERAL STENT PLACEMENT Right 05/18/2021    Procedure: CYSTOSCOPY, WITH URETERAL STENT INSERTION;  Surgeon: Damari Barber Jr., MD;  Location: [facility] OR;  Service: Urology;  Laterality: Right;    CYSTOSCOPY W/ URETERAL STENT PLACEMENT Right 06/22/2022    Procedure: CYSTOSCOPY, WITH URETERAL STENT INSERTION;  Surgeon: Gaby Garcia MD;  Location: [facility] OR;  Service: Urology;  Laterality: Right;  cysto stent exchange    CYSTOSCOPY W/ URETERAL STENT REMOVAL Right 04/20/2021    Procedure: CYSTOSCOPY, WITH URETERAL STENT REMOVAL;  Surgeon: Damari Barber Jr., MD;  Location: [facility];  Service: Urology;  Laterality: Right;    CYSTOSCOPY WITH URETEROSCOPY, RETROGRADE PYELOGRAPHY, AND INSERTION OF STENT Right 03/30/2021    Procedure: CYSTOSCOPY, WITH RETROGRADE PYELOGRAM AND URETERAL STENT INSERTION;  Surgeon: Damari Barber Jr., MD;  Location: [facility] OR;  Service: Urology;  Laterality: Right;    CYSTOURETEROSCOPY WITH RETROGRADE PYELOGRAPHY AND INSERTION OF STENT INTO URETER Right 01/18/2022    Procedure: CYSTOURETEROSCOPY, WITH RETROGRADE PYELOGRAM AND URETERAL STENT INSERTION;  Surgeon: Damari Barber Jr., MD;  Location: [facility];  Service: Urology;  Laterality: Right;    CYSTOURETEROSCOPY WITH RETROGRADE PYELOGRAPHY AND INSERTION OF STENT INTO URETER Right 8/30/2022    Procedure: CYSTOURETEROSCOPY, WITH RETROGRADE  PYELOGRAM AND URETERAL STENT INSERTION;  Surgeon: Damari Barber Jr., MD;  Location: [facility] OR;  Service: Urology;  Laterality: Right;    DIAGNOSTIC LAPAROSCOPY N/A 02/13/2020    Procedure: LAPAROSCOPY, DIAGNOSTIC;  Surgeon: Robbi Lovell III, MD;  Location: [facility] OR;  Service: General;  Laterality: N/A;    HYSTERECTOMY  1980    ILEOSTOMY N/A 02/13/2020    Procedure: CREATION, ILEOSTOMY Loop;  Surgeon: Robbi Lovell III, MD;  Location: [facility] OR;  Service: General;  Laterality: N/A;    ILEOSTOMY CLOSURE N/A 05/18/2020    Procedure: CLOSURE, ILEOSTOMY;  Surgeon: Robbi Lovell III, MD;  Location: [facility] OR;  Service: General;  Laterality: N/A;    LASER LITHOTRIPSY Right 03/30/2021    Procedure: LITHOTRIPSY, USING LASER;  Surgeon: Damari Barber Jr., MD;  Location: [facility] OR;  Service: Urology;  Laterality: Right;    LUMBAR LAMINECTOMY      LYSIS OF ADHESIONS N/A 02/13/2020    Procedure: LYSIS, ADHESIONS;  Surgeon: Robbi Lovell III, MD;  Location: [facility] OR;  Service: General;  Laterality: N/A;    NECK SURGERY      PERCUTANEOUS TRANSLUMINAL ANGIOPLASTY OF ARTERIOVENOUS FISTULA N/A 01/19/2022    Procedure: PTA, AV FISTULA;  Surgeon: Raz Maher MD;  Location: [facility] CATH/EP LAB;  Service: Cardiothoracic;  Laterality: N/A;    PHLEBOGRAPHY  02/28/2020    Procedure: VENOGRAM;  Surgeon: Robbi Lovell III, MD;  Location: [facility] OR;  Service: General;;    REMOVAL OF VASCULAR ACCESS PORT Right 02/13/2020    Procedure: REMOVAL, VASCULAR ACCESS PORT;  Surgeon: Robbi Lovell III, MD;  Location: [facility] OR;  Service: General;  Laterality: Right;    RETROGRADE PYELOGRAPHY  03/16/2021    Procedure: PYELOGRAM, RETROGRADE;  Surgeon: Damari Barber Jr., MD;  Location: [facility] OR;  Service: Urology;;    RETROGRADE PYELOGRAPHY Right 05/18/2021    Procedure: PYELOGRAM, RETROGRADE;  Surgeon: Damari Barber Jr., MD;  Location: [facility] OR;  Service: Urology;  Laterality: Right;    RETROGRADE PYELOGRAPHY  06/22/2022     Procedure: PYELOGRAM, RETROGRADE;  Surgeon: Gaby Garcia MD;  Location: [facility] OR;  Service: Urology;;    SHOULDER SURGERY      TONSILLECTOMY      URETEROSCOPIC REMOVAL OF URETERIC CALCULUS Right 03/30/2021    Procedure: REMOVAL, CALCULUS, URETER, URETEROSCOPIC;  Surgeon: Damari Barber Jr., MD;  Location: [facility] OR;  Service: Urology;  Laterality: Right;    URETEROSCOPIC REMOVAL OF URETERIC CALCULUS Right 04/20/2021    Procedure: REMOVAL, CALCULUS, URETER, URETEROSCOPIC;  Surgeon: Damari Barber Jr., MD;  Location: [facility] OR;  Service: Urology;  Laterality: Right;    URETEROSCOPIC REMOVAL OF URETERIC CALCULUS Right 8/30/2022    Procedure: REMOVAL, CALCULUS, URETER, URETEROSCOPIC;  Surgeon: Damari Barber Jr., MD;  Location: [facility] OR;  Service: Urology;  Laterality: Right;    vulva cancer          Tobacco Use:  The patient  reports that she quit smoking about 22 years ago. Her smoking use included cigarettes. She has a 33.00 pack-year smoking history. She quit smokeless tobacco use about 22 years ago.     Results for orders placed or performed during the hospital encounter of 09/07/22   EKG 12-LEAD    Collection Time: 09/07/22  9:57 AM    Narrative    Test Reason : R07.9,    Vent. Rate : 093 BPM     Atrial Rate : 093 BPM     P-R Int : 156 ms          QRS Dur : 082 ms      QT Int : 384 ms       P-R-T Axes : 068 013 025 degrees     QTc Int : 477 ms    Sinus rhythm with Premature atrial complexes  Inferior infarct ,age undetermined  Anterior infarct ,age undetermined  Abnormal ECG  When compared with ECG of 28-JUN-2022 17:55,  Premature atrial complexes are now Present  Nonspecific T wave abnormality, worse in Anterior leads    Referred By: [email or web address]   SELF           Confirmed By:              Lab Results   Component Value Date    WBC 21.26 (H) 09/07/2022    HGB 11.3 (L) 09/07/2022    HCT 35.5 (L) 09/07/2022     (H) 09/07/2022     09/07/2022     BMP  Lab Results   Component Value Date    NA  139 09/07/2022    K 2.3 (LL) 09/07/2022     09/07/2022    CO2 25 09/07/2022    BUN 20 09/07/2022    CREATININE 5.5 (H) 09/07/2022    CALCIUM 7.8 (L) 09/07/2022    ANIONGAP 11 09/07/2022    ESTGFRAFRICA 12.5 (A) 07/20/2022    EGFRNONAA 10.9 (A) 07/20/2022       3/2020 ECHO    · Mild concentric left ventricular hypertrophy.  · Pulmonary hypertension present.  · Normal left ventricular systolic function. The estimated ejection fraction is 60%.  · Grade I (mild) left ventricular diastolic dysfunction consistent with impaired relaxation.  · No wall motion abnormalities.  · Normal right ventricular systolic function.  · Intermediate central venous pressure (8 mmHg).  · Trivial pericardial effusion.  · There is a left pleural effusion.  · The estimated PA systolic pressure is 47 mmHg          Pre-op Assessment    I have reviewed the Patient Summary Reports.    I have reviewed the Nursing Notes. I have reviewed the NPO Status.   I have reviewed the Medications.     Review of Systems  Anesthesia Hx:  No problems with previous Anesthesia  Denies Family Hx of Anesthesia complications.   Denies Personal Hx of Anesthesia complications.   Social:  Non-Smoker, No Alcohol Use    Hematology/Oncology:         -- Anemia: Hematology Comments:   Iron deficiency anemia  --  Cancer in past history (Vulvar cancinoma): s/p surgery, s/p radiation therapy and s/p chemotherapy   chemotherapy, radiation and surgery  Oncology Comments: Vulva cancer     EENT/Dental:  EENT/Dental Normal Full upper and lower partial out.  Broken right lower molar   Cardiovascular:   Hypertension, well controlled ECG has been reviewed. Pulmonary hypertension   Pulmonary:   COPD, mild Emphysema without inhaler use  Without home oxygen use  Without pulmonologist  Without hospitalizations for exacerbations at this time   Renal/:   Chronic Renal Disease (Patient dialyzed yesterday. AV fistula hematoma, she is going to get a dialysis catheter placed later.),  ESRD, Dialysis renal calculi  Dialysis Monday Wednesday Friday, last dialysis yesterday per chart   Hepatic/GI:   Chronic nausea   Musculoskeletal:   Lymphedema due to radiation Spine Disorders: cervical and lumbar Disc disease, Degenerative disease and Chronic Pain    Neurological:   Neuromuscular Disease, (LTAC) Bilateral lower extremity neuropathy  Peripheral Neuropathy    Endocrine:   Diabetes, poorly controlled Hypothyroidism    Psych:  Psychiatric Normal           Physical Exam  General:  Well nourished      Airway/Jaw/Neck:  Airway Findings: Mouth Opening: Normal   Tongue: Normal   General Airway Assessment: Adult Mallampati: II  Improves to II with phonation.  TM Distance: Normal, at least 6 cm   Jaw/Neck Findings:  Neck ROM: Extension Decreased, Mild       Dental:  Dental Findings: Upper partial dentures, Lower partial dentures     Chest/Lungs:  Chest/Lungs Findings: Clear to auscultation, Normal Respiratory Rate      Heart/Vascular:  Heart Findings: Rate: Normal  Rhythm: Regular Rhythm  Sounds: Normal        Mental Status:  Mental Status Findings:  Cooperative, Alert and Oriented         Anesthesia Plan  Type of Anesthesia, risks & benefits discussed:  Anesthesia Type:  Gen ETT    Patient's Preference:   Plan Factors:          Intra-op Monitoring Plan: standard ASA monitors  Intra-op Monitoring Plan Comments:   Post Op Pain Control Plan: IV/PO Opioids PRN and multimodal analgesia  Post Op Pain Control Plan Comments:     Induction:   IV and rapid sequence  Beta Blocker:  Patient is not currently on a Beta-Blocker (No further documentation required).       Informed Consent: Informed consent signed with the Patient and all parties understand the risks and agree with anesthesia plan.  All questions answered.  Anesthesia consent signed with patient.  ASA Score: 3   Emergent   Day of Surgery Review of History & Physical:        Anesthesia Plan Notes:     GETA  Monitor/treat hyponatremia  Zofran, Pepcid  [facility]  1000 mg             Ready For Surgery From Anesthesia Perspective.           Physical Exam  General: Well nourished    Airway:  Mallampati: II / II  Mouth Opening: Normal  TM Distance: Normal, at least 6 cm  Tongue: Normal  Neck ROM: Extension Decreased, Mild    Dental:  Upper partial dentures, Lower partial dentures    Chest/Lungs:  Clear to auscultation, Normal Respiratory Rate    Heart:  Rate: Normal  Rhythm: Regular Rhythm  Sounds: Normal          Anesthesia Plan  Type of Anesthesia, risks & benefits discussed:    Anesthesia Type: Gen ETT  Intra-op Monitoring Plan: standard ASA monitors  Post Op Pain Control Plan: IV/PO Opioids PRN and multimodal analgesia  Induction:  IV and rapid sequence  Informed Consent: Informed consent signed with the Patient and all parties understand the risks and agree with anesthesia plan.  All questions answered.   ASA Score: 3 Emergent  Anesthesia Plan Notes:     GETA  Monitor/treat hyponatremia  Zofran, Pepcid  Ofirmev 1000 mg         Ready For Surgery From Anesthesia Perspective.       .

## 2022-09-07 NOTE — ASSESSMENT & PLAN NOTE
History of ischemic colitis.  Now presenting with acute ischemia.  She has an acute abdomen and needs to go to the operating room emergently.  I discussed this with the emergency room physician.  I have already ordered the case and we will take her as soon as the operating room allows.

## 2022-09-07 NOTE — ED PROVIDER NOTES
Encounter Date: 9/7/2022       History     Chief Complaint   Patient presents with    Rectal Bleeding     C/o LLQ abd pain with N/V x 4 days with onset of rectal bleeding this morning. Pt reports passing large clots. Pt is febrile and pale in triage.      Patient presents complaining of abdominal pain, nausea, vomiting, rectal bleeding.  Patient has had multiple bouts diarrhea the last few days and then started with bloody clots of blood today.  Patient has had decreased p.o. intake for the last few days.  Patient has a history of end-stage renal disease.  Patient last had dialysis yesterday.  At the worst symptoms are moderate.  She has not had this before.    Review of patient's allergies indicates:   Allergen Reactions    Ciprofloxacin Other (See Comments)     Elevated liver enzymes      Pcn [penicillins] Anaphylaxis     TOLERATES ROCEPHIN WITHOUT DIFFICULTY     Past Medical History:   Diagnosis Date    Cellulitis of trunk     Perineum, numerous episodes    Chronic diarrhea 02/10/2020    Short-gut syndrome    End stage renal disease on dialysis 01/07/2018    Fatigue     Hypertension     Hypothyroidism 02/10/2020    Iron deficiency anemia due to chronic blood loss 01/07/2018    Pulmonary hypertension 03/20/2020    Vulvar cancer 01/07/2019     Past Surgical History:   Procedure Laterality Date    ABDOMINAL SURGERY      ANGIOGRAPHY OF ARTERIOVENOUS SHUNT N/A 01/19/2022    Procedure: Fistulogram with Possible Intervention;  Surgeon: Raz Maher MD;  Location: [facility] CATH/EP LAB;  Service: Cardiothoracic;  Laterality: N/A;    APPENDECTOMY      AV FISTULA PLACEMENT Left 2018    BACK SURGERY      BLADDER FULGURATION N/A 06/16/2020    Procedure: FULGURATION, BLADDER;  Surgeon: Damari Barber Jr., MD;  Location: [facility] OR;  Service: Urology;  Laterality: N/A;    carpel tunnel surgery once on each hand      CERVICAL FUSION      x 4    cervix repair      CHOLECYSTECTOMY  2000    COLECTOMY      CYSTOSCOPY W/ RETROGRADES   06/16/2020    Procedure: CYSTOSCOPY, WITH RETROGRADE PYELOGRAM;  Surgeon: Damari Barber Jr., MD;  Location: [facility] OR;  Service: Urology;;    CYSTOSCOPY W/ RETROGRADES Right 04/20/2021    Procedure: CYSTOSCOPY, WITH RETROGRADE PYELOGRAM;  Surgeon: Damari Barber Jr., MD;  Location: [facility] OR;  Service: Urology;  Laterality: Right;    CYSTOSCOPY W/ URETERAL STENT PLACEMENT Right 03/16/2021    Procedure: CYSTOSCOPY, WITH URETERAL STENT INSERTION;  Surgeon: Damari Barber Jr., MD;  Location: [facility] OR;  Service: Urology;  Laterality: Right;    CYSTOSCOPY W/ URETERAL STENT PLACEMENT Right 05/18/2021    Procedure: CYSTOSCOPY, WITH URETERAL STENT INSERTION;  Surgeon: Damari Barber Jr., MD;  Location: [facility] OR;  Service: Urology;  Laterality: Right;    CYSTOSCOPY W/ URETERAL STENT PLACEMENT Right 06/22/2022    Procedure: CYSTOSCOPY, WITH URETERAL STENT INSERTION;  Surgeon: Gaby Garcia MD;  Location: [facility] OR;  Service: Urology;  Laterality: Right;  cysto stent exchange    CYSTOSCOPY W/ URETERAL STENT REMOVAL Right 04/20/2021    Procedure: CYSTOSCOPY, WITH URETERAL STENT REMOVAL;  Surgeon: Damari Barber Jr., MD;  Location: [facility] OR;  Service: Urology;  Laterality: Right;    CYSTOSCOPY WITH URETEROSCOPY, RETROGRADE PYELOGRAPHY, AND INSERTION OF STENT Right 03/30/2021    Procedure: CYSTOSCOPY, WITH RETROGRADE PYELOGRAM AND URETERAL STENT INSERTION;  Surgeon: Damari Barber Jr., MD;  Location: [facility] OR;  Service: Urology;  Laterality: Right;    CYSTOURETEROSCOPY WITH RETROGRADE PYELOGRAPHY AND INSERTION OF STENT INTO URETER Right 01/18/2022    Procedure: CYSTOURETEROSCOPY, WITH RETROGRADE PYELOGRAM AND URETERAL STENT INSERTION;  Surgeon: Damari Barber Jr., MD;  Location: [facility] OR;  Service: Urology;  Laterality: Right;    CYSTOURETEROSCOPY WITH RETROGRADE PYELOGRAPHY AND INSERTION OF STENT INTO URETER Right 8/30/2022    Procedure: CYSTOURETEROSCOPY, WITH RETROGRADE PYELOGRAM AND URETERAL STENT INSERTION;  Surgeon: Damari  LASHANDA Barber Jr., MD;  Location: [facility] OR;  Service: Urology;  Laterality: Right;    DIAGNOSTIC LAPAROSCOPY N/A 02/13/2020    Procedure: LAPAROSCOPY, DIAGNOSTIC;  Surgeon: Robbi Lovell III, MD;  Location: [facility] OR;  Service: General;  Laterality: N/A;    HYSTERECTOMY  1980    ILEOSTOMY N/A 02/13/2020    Procedure: CREATION, ILEOSTOMY Loop;  Surgeon: Robbi Lovell III, MD;  Location: [facility] OR;  Service: General;  Laterality: N/A;    ILEOSTOMY CLOSURE N/A 05/18/2020    Procedure: CLOSURE, ILEOSTOMY;  Surgeon: Robbi Lovell III, MD;  Location: [facility] OR;  Service: General;  Laterality: N/A;    LASER LITHOTRIPSY Right 03/30/2021    Procedure: LITHOTRIPSY, USING LASER;  Surgeon: Damari Barber Jr., MD;  Location: [facility] OR;  Service: Urology;  Laterality: Right;    LUMBAR LAMINECTOMY      LYSIS OF ADHESIONS N/A 02/13/2020    Procedure: LYSIS, ADHESIONS;  Surgeon: Robbi Lovell III, MD;  Location: [facility] OR;  Service: General;  Laterality: N/A;    NECK SURGERY      PERCUTANEOUS TRANSLUMINAL ANGIOPLASTY OF ARTERIOVENOUS FISTULA N/A 01/19/2022    Procedure: PTA, AV FISTULA;  Surgeon: Raz Maher MD;  Location: [facility] CATH/EP LAB;  Service: Cardiothoracic;  Laterality: N/A;    PHLEBOGRAPHY  02/28/2020    Procedure: VENOGRAM;  Surgeon: Robbi Lovell III, MD;  Location: [facility] OR;  Service: General;;    REMOVAL OF VASCULAR ACCESS PORT Right 02/13/2020    Procedure: REMOVAL, VASCULAR ACCESS PORT;  Surgeon: Robbi Lovell III, MD;  Location: [facility] OR;  Service: General;  Laterality: Right;    RETROGRADE PYELOGRAPHY  03/16/2021    Procedure: PYELOGRAM, RETROGRADE;  Surgeon: Damari Barber Jr., MD;  Location: [facility] OR;  Service: Urology;;    RETROGRADE PYELOGRAPHY Right 05/18/2021    Procedure: PYELOGRAM, RETROGRADE;  Surgeon: Damari Barber Jr., MD;  Location: [facility] OR;  Service: Urology;  Laterality: Right;    RETROGRADE PYELOGRAPHY  06/22/2022    Procedure: PYELOGRAM, RETROGRADE;  Surgeon: Gaby ALVES  MD Jose;  Location: [facility] OR;  Service: Urology;;    SHOULDER SURGERY      TONSILLECTOMY      URETEROSCOPIC REMOVAL OF URETERIC CALCULUS Right 2021    Procedure: REMOVAL, CALCULUS, URETER, URETEROSCOPIC;  Surgeon: Damari Barber Jr., MD;  Location: [facility] OR;  Service: Urology;  Laterality: Right;    URETEROSCOPIC REMOVAL OF URETERIC CALCULUS Right 2021    Procedure: REMOVAL, CALCULUS, URETER, URETEROSCOPIC;  Surgeon: Damari Barber Jr., MD;  Location: [facility] OR;  Service: Urology;  Laterality: Right;    URETEROSCOPIC REMOVAL OF URETERIC CALCULUS Right 2022    Procedure: REMOVAL, CALCULUS, URETER, URETEROSCOPIC;  Surgeon: Damari Barber Jr., MD;  Location: [facility] OR;  Service: Urology;  Laterality: Right;    vulva cancer       Family History   Problem Relation Age of Onset    Heart disease Mother     Hypertension Mother     Cancer Mother         lung    Heart disease Father     Heart disease Sister         Open heart surgery    No Known Problems Daughter      Social History     Tobacco Use    Smoking status: Former     Packs/day: 1.00     Years: 33.00     Pack years: 33.00     Types: Cigarettes     Quit date: 2000     Years since quittin.6    Smokeless tobacco: Former     Quit date:    Substance Use Topics    Alcohol use: No    Drug use: No     Review of Systems   All other systems reviewed and are negative.    Physical Exam     Initial Vitals [22 0857]   BP Pulse Resp Temp SpO2   131/70 97 17 (!) 100.5 °F (38.1 °C) (!) 94 %      MAP       --         Physical Exam    Nursing note and vitals reviewed.  Constitutional:   Pleasant, polite, no acute distress, frail   HENT:   Head: Normocephalic and atraumatic.   Mouth/Throat: Oropharynx is clear and moist.   Eyes: EOM are normal.   Neck: Neck supple.   Normal range of motion.  Cardiovascular:  Normal rate, regular rhythm, normal heart sounds and intact distal pulses.           Pulmonary/Chest: Breath sounds normal. No respiratory  distress.   Abdominal: There is abdominal tenderness.   Patient is tender diffusely with some guarding   Musculoskeletal:         General: Normal range of motion.      Cervical back: Normal range of motion and neck supple.      Comments: There is a good thrill to the left upper extremity     Neurological: She is alert and oriented to person, place, and time.   Skin: Skin is warm and dry. Capillary refill takes less than 2 seconds.   Psychiatric: She has a normal mood and affect. Her behavior is normal. Judgment and thought content normal.       ED Course   Procedures  Labs Reviewed   COMPREHENSIVE METABOLIC PANEL - Abnormal; Notable for the following components:       Result Value    Potassium 2.3 (*)     Creatinine 5.5 (*)     Calcium 7.8 (*)     Albumin 2.7 (*)     eGFR 7.7 (*)     All other components within normal limits    Narrative:     K critical result(s) repeated. Called and verbal readback obtained   from Wellington Machado RN/ED by S 09/07/2022 11:37   MAGNESIUM - Abnormal; Notable for the following components:    Magnesium 1.3 (*)     All other components within normal limits   CBC W/ AUTO DIFFERENTIAL - Abnormal; Notable for the following components:    WBC 21.26 (*)     RBC 3.22 (*)     Hemoglobin 11.3 (*)     Hematocrit 35.5 (*)      (*)     MCH 35.1 (*)     MCHC 31.8 (*)     RDW 17.1 (*)     Immature Granulocytes 1.0 (*)     Gran # (ANC) 19.7 (*)     Immature Grans (Abs) 0.22 (*)     Lymph # 0.5 (*)     Gran % 92.4 (*)     Lymph % 2.3 (*)     All other components within normal limits   PROTIME-INR - Abnormal; Notable for the following components:    PT 21.0 (*)     All other components within normal limits   LACTIC ACID, PLASMA - Abnormal; Notable for the following components:    Lactate (Lactic Acid) 2.0 (*)     All other components within normal limits    Narrative:     LA critical result(s) repeated. Called and verbal readback obtained   from Wellington Machado RN/ED by S 09/07/2022 11:38   ISTAT  CREATININE - Abnormal; Notable for the following components:    POC Creatinine 5.9 (*)     All other components within normal limits   CULTURE, BLOOD   CULTURE, BLOOD   TROPONIN I   SARS-COV-2 RNA AMPLIFICATION, QUAL   LACTIC ACID, PLASMA   LACTIC ACID, PLASMA   TYPE & SCREEN   POCT CREATININE        ECG Results              EKG 12-LEAD (In process)  Result time 09/07/22 10:22:46      In process by Interface, Lab In [facility] (09/07/22 10:22:46)                   Narrative:    Test Reason : R07.9,    Vent. Rate : 093 BPM     Atrial Rate : 093 BPM     P-R Int : 156 ms          QRS Dur : 082 ms      QT Int : 384 ms       P-R-T Axes : 068 013 025 degrees     QTc Int : 477 ms    Sinus rhythm with Premature atrial complexes  Inferior infarct ,age undetermined  Anterior infarct ,age undetermined  Abnormal ECG  When compared with ECG of 28-JUN-2022 17:55,  Premature atrial complexes are now Present  Nonspecific T wave abnormality, worse in Anterior leads    Referred By: [email or web address]   SELF           Confirmed By:                                   Imaging Results              X-Ray Chest AP Portable (Final result)  Result time 09/07/22 12:18:26      Final result by Meredith Colunga MD (09/07/22 12:18:26)                   Narrative:    XR CHEST 1 VIEW    CLINICAL HISTORY:  72 years Female Sepsis    Comparison is made to prior study at 9:56 AM    FINDINGS: Cardiomediastinal silhouette is within normal limits. Lungs are normally expanded with no airspace consolidation. No pleural effusion or pneumothorax. No acute osseous abnormality. There is been prior cervical fusion.    IMPRESSION: No acute pulmonary process.    Electronically signed by:  Meredith Colunga MD  9/7/2022 12:18 PM CDT Workstation: [id number]                                     CT Abdomen Pelvis  Without Contrast (Final result)  Result time 09/07/22 11:50:15   Procedure changed from CT Abdomen Pelvis With Contrast     Final result by Meredith Colunga MD  (09/07/22 11:50:15)                   Narrative:    CMS MANDATED QUALITY DATA - CT RADIATION - 436    All CT scans at this facility utilize dose modulation, iterative reconstruction, and/or weight based dosing when appropriate to reduce radiation dose to as low as reasonably achievable.    HISTORY: GI bleed, abdominal pain    COMPARISON: 7/7/2022    FINDINGS: Noncontrast axial images were obtained. Nonenhanced study is tailored for the detection of urolithiasis, and is insensitive for abnormalities of the solid organs, vasculature and hollow viscera.    CT ABDOMEN: There is coronary artery calcification. The lung bases are clear.    There is moderate portal venous air within the intrahepatic portal veins and main portal vein.    There is diffuse wall thickening of the colon with pneumatosis within the descending colon. Findings are suggestive of ischemic colitis. There is air within a vein within the left lower quadrant.    There is edema within the pericolonic fat.    The small bowel is not dilated. There are anastomotic sutures in the right lower quadrant.    The liver, spleen and pancreas have a normal noncontrast appearance. The gallbladder is absent. The adrenal glands are normal.    The kidneys are small in size with numerous nonobstructing renal stones. There is a right ureteral stent in place. There is resolution of the right hydronephrosis.    There is moderate vascular calcification of the aorta and its branches. There is no mesenteric or retroperitoneal adenopathy.    There is levoscoliosis of the lumbar spine with multilevel degenerative changes. There are degenerative changes of both hips.    CT PELVIS: There is pneumatosis within the descending colon and sigmoid colon with diffuse wall thickening of the colon suggestive of ischemic colitis. There is minimal free fluid in the pelvis. The bladder is normal. The uterus is absent. There is moderate vascular calcification of the iliac  arteries.    IMPRESSION: Portal venous air with pneumatosis within the descending colon and sigmoid colon with diffuse wall thickening of the colon. Findings are suggestive of ischemic colitis. There is no free air    Minimal free fluid within the pelvis    Bilateral renal calculi with right ureteral stent in place with resolution of the right hydronephrosis    Moderate vascular calcification of the aorta and iliac arteries    These findings were called to Dr. rausch in the emergency department at 11:45 AM    Electronically signed by:  Meredith Colunga MD  9/7/2022 11:50 AM CDT Workstation: [id number]                                     X-ray Chest AP Portable (Final result)  Result time 09/07/22 10:24:18      Final result by Dixon Thomas MD (09/07/22 10:24:18)                   Narrative:    Chest single view    Clinical data:Nausea, vomiting. Comparison to July 7.    FINDINGS: AP view of the chest demonstrates no cardiac, pulmonary, or osseous abnormalities.    IMPRESSION:  1. Normal chest single view.    Electronically signed by:  Dixon Thomas MD  9/7/2022 10:24 AM CDT Workstation: [id number]                                     Medications   sodium chloride 0.9% flush 10 mL (has no administration in time range)   magnesium sulfate in dextrose IVPB (premix) 1 g (1 g Intravenous New Bag 9/7/22 1332)   NORepinephrine 4 mg in dextrose 5% 250 mL infusion (premix) (titrating) (has no administration in time range)   sodium chloride 0.9% flush 10 mL (has no administration in time range)   0.9%  NaCl infusion (has no administration in time range)   potassium chloride 10 mEq in 100 mL IVPB (has no administration in time range)   potassium chloride SA CR tablet 20 mEq (has no administration in time range)   potassium chloride SA CR tablet 40 mEq (has no administration in time range)   potassium chloride SA CR tablet 20 mEq (has no administration in time range)   potassium chloride SA CR tablet 40 mEq (has no  administration in time range)   magnesium oxide tablet 800 mg (has no administration in time range)   magnesium sulfate 2g in water 50mL IVPB (premix) (has no administration in time range)   magnesium sulfate in dextrose IVPB (premix) 1 g (has no administration in time range)   magnesium sulfate 2g in water 50mL IVPB (premix) (has no administration in time range)   magnesium sulfate 2g in water 50mL IVPB (premix) (has no administration in time range)   sodium phosphate 15 mmol in dextrose 5 % 250 mL IVPB (has no administration in time range)   sodium phosphate 20.01 mmol in dextrose 5 % 250 mL IVPB (has no administration in time range)   sodium phosphate 15 mmol in dextrose 5 % 250 mL IVPB (has no administration in time range)   sodium phosphate 30 mmol in dextrose 5 % 250 mL IVPB (has no administration in time range)   sodium phosphate 20.01 mmol in dextrose 5 % 250 mL IVPB (has no administration in time range)   calcium chloride 1 g in dextrose 5 % 100 mL IVPB (has no administration in time range)   calcium chloride 1 g in dextrose 5 % 100 mL IVPB (has no administration in time range)   calcium chloride 1 g in dextrose 5 % 100 mL IVPB (has no administration in time range)   meropenem-0.9% sodium chloride 500 mg/50 mL IVPB (has no administration in time range)   acetaminophen tablet 650 mg (650 mg Oral Given 9/7/22 1058)   ceFOXItin 2 g/50ml Dextrose IVPB (0 g Intravenous Stopped 9/7/22 1320)   0.9%  NaCl infusion (0 mLs Intravenous Stopped 9/7/22 1320)   lactated ringers bolus 1,028 mL (0 mLs Intravenous Stopped 9/7/22 1320)   potassium chloride 10 mEq in 100 mL IVPB (0 mEq Intravenous Stopped 9/7/22 1334)     Medical Decision Making:   Initial Assessment:   No apparent distress  Differential Diagnosis:   Considerations include but are not limited to GI bleed, colitis, diverticulitis, sepsis, acute kidney injury  Clinical Tests:   Lab Tests: Reviewed and Ordered  Radiological Study: Ordered and Reviewed  Medical  Tests: Reviewed and Ordered  ED Management:  In the emergency department patient found to have severe hypokalemia, elevated white blood cell count, CT concerning for pneumatosis throughout the colon.  Patient received sepsis fluid protocol at 15 cc/kg secondary to dialysis as well as broad-spectrum antibiotics.  She was consulted immediately to general surgery who will take patient to the operating room.  She has a midline for resuscitative measures.  I have ordered Levophed from the emergency department however patient has not needed it.  Potassium was replaced in the emergency department.  Patient is critically ill.    Attending Critical Care:   Critical Care Times:   Direct Patient Care (initial evaluation, reassessments, and time considering the case)................................................................10 minutes.   Additional History from reviewing old medical records or taking additional history from the family, EMS, PCP, etc.......................10 minutes.   Ordering, Reviewing, and Interpreting Diagnostic Studies...............................................................................................................10 minutes.   Documentation..................................................................................................................................................................................5 minutes.   ==============================================================  · Total Critical Care Time - exclusive of procedural time: 35 minutes.  ==============================================================  Critical care was necessary to treat or prevent imminent or life-threatening deterioration of the following conditions:  Severe hypokalemia, sepsis.   Critical care was time spent personally by me on the following activities: obtaining history from patient or relative, examination of patient, review of x-rays / CT sent with the patient, ordering lab, x-rays, and/or  EKG, development of treatment plan with patient or relative, ordering and performing treatments and interventions, interpretation of cardiac measurements and re-evaluation of patient's condition.   Critical Care Condition: potentially life-threatening              ED Course as of 09/07/22 1337   Wed Sep 07, 2022   1322 I was able to talk with Dr. Chavez who will take the patient to the OR today [AP]      ED Course User Index  [AP] Fran Ford MD             Clinical Impression:   Final diagnoses:  [K55.9] Ischemic colitis (Primary)  [N18.6] End stage renal disease  [E87.6] Hypokalemia        ED Disposition Condition    Admit                 Fran Ford MD  09/07/22 2969

## 2022-09-07 NOTE — CONSULTS
[facility] Emergency Dept  General Surgery  Consult Note    Patient Name: Shara Hutchins  MRN: [id number]  Code Status: Full Code  Admission Date: 9/7/2022  Hospital Length of Stay: 0 days  Attending Physician: Aleksander Phan MD  Primary Care Provider: April Horton MD    Patient information was obtained from patient and ER records.     Inpatient consult to General surgery  Consult performed by: Brigette Chavez MD  Consult ordered by: Fran Ford MD  Reason for consult: ischemic colitis  Assessment/Recommendations: Surgery  Likely colostomy        Subjective:     Principal Problem: Ischemic colitis    History of Present Illness: 72-year-old history of multiple abdominal surgeries for ischemic colitis presents with diarrhea and severe abdominal pain.  She did note the diarrhea became bloody.  Diagnosed with ischemic colitis and I am consulted for surgery.      Current Facility-Administered Medications on File Prior to Encounter   Medication    electrolyte-S (ISOLYTE)    lactated ringers infusion     Current Outpatient Medications on File Prior to Encounter   Medication Sig    cholecalciferol, vitamin D3, (VITAMIN D3) 25 mcg (1,000 unit) capsule Take 1 capsule (1,000 Units total) by mouth once daily.    diltiaZEM (CARDIZEM CD) 120 MG Cp24 Take 1 capsule (120 mg total) by mouth once daily.    ferric citrate (AURYXIA) 210 mg iron Tab Take 2 tablets by mouth 3 (three) times daily with meals.    gabapentin (NEURONTIN) 100 MG capsule Take 100 mg by mouth 2 (two) times daily. HCS    hydrALAZINE (APRESOLINE) 50 MG tablet Take 1 tablet (50 mg total) by mouth every 8 (eight) hours.    levothyroxine (SYNTHROID) 88 MCG tablet TAKE 1 TABLET BEFORE BREAKFAST (Patient taking differently: Take 88 mcg by mouth before breakfast. TAKE 1 TABLET BEFORE BREAKFAST)    LORazepam (ATIVAN) 0.5 MG tablet Take 1 tablet (0.5 mg total) by mouth every evening.    promethazine (PHENERGAN) 25 MG tablet TAKE 1 TABLET (25  MG TOTAL) BY MOUTH 2 (TWO) TIMES DAILY AS NEEDED FOR NAUSEA.    rOPINIRole (REQUIP) 0.5 MG tablet Take 0.5 mg by mouth every 8 (eight) hours.    sertraline (ZOLOFT) 50 MG tablet Take 1 tablet (50 mg total) by mouth every evening. Take 50 mg by mouth every evening.    traMADoL (ULTRAM) 50 mg tablet Take 1 tablet (50 mg total) by mouth every 6 (six) hours as needed.    zinc gluconate 50 mg tablet Take 50 mg by mouth once daily.    [DISCONTINUED] miconazole (MICOTIN) 2 % cream Apply topically 2 (two) times daily.       Review of patient's allergies indicates:   Allergen Reactions    Ciprofloxacin Other (See Comments)     Elevated liver enzymes      Pcn [penicillins] Anaphylaxis     TOLERATES ROCEPHIN WITHOUT DIFFICULTY       Past Medical History:   Diagnosis Date    Cellulitis of trunk     Perineum, numerous episodes    Chronic diarrhea 02/10/2020    Short-gut syndrome    End stage renal disease on dialysis 01/07/2018    Fatigue     Hypertension     Hypothyroidism 02/10/2020    Iron deficiency anemia due to chronic blood loss 01/07/2018    Pulmonary hypertension 03/20/2020    Vulvar cancer 01/07/2019     Past Surgical History:   Procedure Laterality Date    ABDOMINAL SURGERY      ANGIOGRAPHY OF ARTERIOVENOUS SHUNT N/A 01/19/2022    Procedure: Fistulogram with Possible Intervention;  Surgeon: Raz Maher MD;  Location: [facility] CATH/EP LAB;  Service: Cardiothoracic;  Laterality: N/A;    APPENDECTOMY      AV FISTULA PLACEMENT Left 2018    BACK SURGERY      BLADDER FULGURATION N/A 06/16/2020    Procedure: FULGURATION, BLADDER;  Surgeon: Damari Barber Jr., MD;  Location: [facility];  Service: Urology;  Laterality: N/A;    carpel tunnel surgery once on each hand      CERVICAL FUSION      x 4    cervix repair      CHOLECYSTECTOMY  2000    COLECTOMY      CYSTOSCOPY W/ RETROGRADES  06/16/2020    Procedure: CYSTOSCOPY, WITH RETROGRADE PYELOGRAM;  Surgeon: Damari Barber Jr., MD;  Location: [facility];   Service: Urology;;    CYSTOSCOPY W/ RETROGRADES Right 04/20/2021    Procedure: CYSTOSCOPY, WITH RETROGRADE PYELOGRAM;  Surgeon: Damari Barber Jr., MD;  Location: [facility] OR;  Service: Urology;  Laterality: Right;    CYSTOSCOPY W/ URETERAL STENT PLACEMENT Right 03/16/2021    Procedure: CYSTOSCOPY, WITH URETERAL STENT INSERTION;  Surgeon: Damari Barber Jr., MD;  Location: [facility] OR;  Service: Urology;  Laterality: Right;    CYSTOSCOPY W/ URETERAL STENT PLACEMENT Right 05/18/2021    Procedure: CYSTOSCOPY, WITH URETERAL STENT INSERTION;  Surgeon: Damari Barber Jr., MD;  Location: [facility] OR;  Service: Urology;  Laterality: Right;    CYSTOSCOPY W/ URETERAL STENT PLACEMENT Right 06/22/2022    Procedure: CYSTOSCOPY, WITH URETERAL STENT INSERTION;  Surgeon: Gaby Garcia MD;  Location: [facility];  Service: Urology;  Laterality: Right;  cysto stent exchange    CYSTOSCOPY W/ URETERAL STENT REMOVAL Right 04/20/2021    Procedure: CYSTOSCOPY, WITH URETERAL STENT REMOVAL;  Surgeon: Damari Barber Jr., MD;  Location: [facility] OR;  Service: Urology;  Laterality: Right;    CYSTOSCOPY WITH URETEROSCOPY, RETROGRADE PYELOGRAPHY, AND INSERTION OF STENT Right 03/30/2021    Procedure: CYSTOSCOPY, WITH RETROGRADE PYELOGRAM AND URETERAL STENT INSERTION;  Surgeon: Damari Barber Jr., MD;  Location: [facility] OR;  Service: Urology;  Laterality: Right;    CYSTOURETEROSCOPY WITH RETROGRADE PYELOGRAPHY AND INSERTION OF STENT INTO URETER Right 01/18/2022    Procedure: CYSTOURETEROSCOPY, WITH RETROGRADE PYELOGRAM AND URETERAL STENT INSERTION;  Surgeon: Damari Barber Jr., MD;  Location: [facility] OR;  Service: Urology;  Laterality: Right;    CYSTOURETEROSCOPY WITH RETROGRADE PYELOGRAPHY AND INSERTION OF STENT INTO URETER Right 8/30/2022    Procedure: CYSTOURETEROSCOPY, WITH RETROGRADE PYELOGRAM AND URETERAL STENT INSERTION;  Surgeon: Damari Barber Jr., MD;  Location: [facility] OR;  Service: Urology;  Laterality: Right;    DIAGNOSTIC LAPAROSCOPY N/A  02/13/2020    Procedure: LAPAROSCOPY, DIAGNOSTIC;  Surgeon: Robbi Lovell III, MD;  Location: [facility] OR;  Service: General;  Laterality: N/A;    HYSTERECTOMY  1980    ILEOSTOMY N/A 02/13/2020    Procedure: CREATION, ILEOSTOMY Loop;  Surgeon: Robbi Lovell III, MD;  Location: [facility] OR;  Service: General;  Laterality: N/A;    ILEOSTOMY CLOSURE N/A 05/18/2020    Procedure: CLOSURE, ILEOSTOMY;  Surgeon: Robbi Lovell III, MD;  Location: [facility] OR;  Service: General;  Laterality: N/A;    LASER LITHOTRIPSY Right 03/30/2021    Procedure: LITHOTRIPSY, USING LASER;  Surgeon: Damari Barber Jr., MD;  Location: [facility] OR;  Service: Urology;  Laterality: Right;    LUMBAR LAMINECTOMY      LYSIS OF ADHESIONS N/A 02/13/2020    Procedure: LYSIS, ADHESIONS;  Surgeon: Robbi Lovell III, MD;  Location: [facility] OR;  Service: General;  Laterality: N/A;    NECK SURGERY      PERCUTANEOUS TRANSLUMINAL ANGIOPLASTY OF ARTERIOVENOUS FISTULA N/A 01/19/2022    Procedure: PTA, AV FISTULA;  Surgeon: Raz Maher MD;  Location: [facility] CATH/EP LAB;  Service: Cardiothoracic;  Laterality: N/A;    PHLEBOGRAPHY  02/28/2020    Procedure: VENOGRAM;  Surgeon: Robbi Lovell III, MD;  Location: [facility] OR;  Service: General;;    REMOVAL OF VASCULAR ACCESS PORT Right 02/13/2020    Procedure: REMOVAL, VASCULAR ACCESS PORT;  Surgeon: Robbi Lovell III, MD;  Location: [facility] OR;  Service: General;  Laterality: Right;    RETROGRADE PYELOGRAPHY  03/16/2021    Procedure: PYELOGRAM, RETROGRADE;  Surgeon: Damari Barber Jr., MD;  Location: [facility] OR;  Service: Urology;;    RETROGRADE PYELOGRAPHY Right 05/18/2021    Procedure: PYELOGRAM, RETROGRADE;  Surgeon: Damari Barber Jr., MD;  Location: [facility] OR;  Service: Urology;  Laterality: Right;    RETROGRADE PYELOGRAPHY  06/22/2022    Procedure: PYELOGRAM, RETROGRADE;  Surgeon: Gaby Garcia MD;  Location: [facility] OR;  Service: Urology;;    SHOULDER SURGERY      TONSILLECTOMY       URETEROSCOPIC REMOVAL OF URETERIC CALCULUS Right 2021    Procedure: REMOVAL, CALCULUS, URETER, URETEROSCOPIC;  Surgeon: Damari Barber Jr., MD;  Location: [facility] OR;  Service: Urology;  Laterality: Right;    URETEROSCOPIC REMOVAL OF URETERIC CALCULUS Right 2021    Procedure: REMOVAL, CALCULUS, URETER, URETEROSCOPIC;  Surgeon: Damari Barber Jr., MD;  Location: [facility] OR;  Service: Urology;  Laterality: Right;    URETEROSCOPIC REMOVAL OF URETERIC CALCULUS Right 2022    Procedure: REMOVAL, CALCULUS, URETER, URETEROSCOPIC;  Surgeon: Damari Barber Jr., MD;  Location: [facility] OR;  Service: Urology;  Laterality: Right;    vulva cancer       Family History       Problem Relation (Age of Onset)    Cancer Mother    Heart disease Mother, Father, Sister    Hypertension Mother    No Known Problems Daughter          Tobacco Use    Smoking status: Former     Packs/day: 1.00     Years: 33.00     Pack years: 33.00     Types: Cigarettes     Quit date: 2000     Years since quittin.6    Smokeless tobacco: Former     Quit date:    Substance and Sexual Activity    Alcohol use: No    Drug use: No    Sexual activity: Not on file     Review of Systems   Constitutional:  Negative for activity change and fever.   Respiratory:  Negative for shortness of breath and wheezing.    Cardiovascular:  Negative for chest pain and leg swelling.   Gastrointestinal:  Positive for abdominal pain. Negative for abdominal distention.   Genitourinary:  Negative for difficulty urinating.   Skin:  Negative for color change.   Neurological:  Negative for dizziness and facial asymmetry.   Psychiatric/Behavioral:  Negative for agitation and confusion.    All other systems reviewed and are negative.  Objective:     Vital Signs (Most Recent):  Temp: 98.1 °F (36.7 °C) (22 1456)  Pulse: 86 (22 1332)  Resp: (!) 22 (22 1332)  BP: (!) 122/57 (22 1332)  SpO2: (!) 94 % (22 133)   Vital Signs (24h Range):  Temp:   [98.1 °F (36.7 °C)-100.5 °F (38.1 °C)] 98.1 °F (36.7 °C)  Pulse:  [82-97] 86  Resp:  [15-23] 22  SpO2:  [91 %-95 %] 94 %  BP: ()/(53-70) 122/57     Weight: 68.5 kg (151 lb)  Body mass index is 23.65 kg/m².    Physical Exam  Vitals and nursing note reviewed.   HENT:      Head: Normocephalic and atraumatic.   Eyes:      Conjunctiva/sclera: Conjunctivae normal.   Neck:      Vascular: No JVD.   Cardiovascular:      Heart sounds: Normal heart sounds.   Pulmonary:      Effort: Pulmonary effort is normal.      Breath sounds: Normal breath sounds.   Abdominal:      General: Bowel sounds are normal. There is distension.      Palpations: Abdomen is soft.      Tenderness: There is abdominal tenderness. There is guarding.      Hernia: No hernia is present.      Comments: Multiple scars, generalized guarding   Skin:     General: Skin is warm.   Neurological:      Mental Status: She is alert and oriented to person, place, and time.       Significant Labs:  I have reviewed all pertinent lab results within the past 24 hours.  CBC:   Recent Labs   Lab 09/07/22  1010   WBC 21.26*   RBC 3.22*   HGB 11.3*   HCT 35.5*      *   MCH 35.1*   MCHC 31.8*     CMP:   Recent Labs   Lab 09/07/22  1010   GLU 90   CALCIUM 7.8*   ALBUMIN 2.7*   PROT 6.0      K 2.3*   CO2 25      BUN 20   CREATININE 5.5*   ALKPHOS 135   ALT 12   AST 16   BILITOT 0.8       Significant Diagnostics:  I have reviewed all pertinent imaging results/findings within the past 24 hours.  Obvious portal venous gas on the CT scan by my interpretation as well as thickened colon with signs of pneumatosis      Assessment/Plan:     * Ischemic colitis  History of ischemic colitis.  Now presenting with acute ischemia.  She has an acute abdomen and needs to go to the operating room emergently.  I discussed this with the emergency room physician.  I have already ordered the case and we will take her as soon as the operating room allows.      VTE Risk  Mitigation (From admission, onward)         Ordered     IP VTE HIGH RISK PATIENT  Once         09/07/22 1258     Place sequential compression device  Until discontinued         09/07/22 1258                Thank you for your consult. I will follow-up with patient. Please contact us if you have any additional questions.    Brigette Chavez MD  General Surgery  [facility] - Emergency Dept

## 2022-09-07 NOTE — NURSING
Pt received to room 2211 from OR. Report received from RICHARD Delgadillo CRNA. Pt resting comfortably at this time. 02 sats 100% on 4LPM NC. Pt skin is warm and dry, peripheral pulses palpable. Midline incision with silver mepilex dsg in place. No drainage noted. Ostomy site to LLQ pink with scant amount of thin bloody output. RUQ JOHN to bulb suction with small amount of serosanguinous drainage.  Resp even and unlabored. See flowsheet for frequent vital signs.

## 2022-09-07 NOTE — CONSULTS
Nephrology Consult Note        Patient Name: Shara Hutchins  MRN: [id number]    Patient Class: IP- Inpatient   Admission Date: 9/7/2022  Length of Stay: 0 days  Date of Service: 9/7/2022    Attending Physician: Aleksander Phan MD  Primary Care Provider: April Horton MD    Reason for Consult: ESRD/ANEMIA/HTN/SHPT/ischemic colitis    SUBJECTIVE:     HPI: 72-year-old ESRD on HD MWF and history of multiple abdominal surgeries for ischemic colitis presents with bloody diarrhea and severe abdominal pain, a was diagnosed with ischemic colitis and is going for emergent surgery.    Past Medical History:   Diagnosis Date    Cellulitis of trunk     Perineum, numerous episodes    Chronic diarrhea 02/10/2020    Short-gut syndrome    End stage renal disease on dialysis 01/07/2018    Fatigue     Hypertension     Hypothyroidism 02/10/2020    Iron deficiency anemia due to chronic blood loss 01/07/2018    Pulmonary hypertension 03/20/2020    Vulvar cancer 01/07/2019     Past Surgical History:   Procedure Laterality Date    ABDOMINAL SURGERY      ANGIOGRAPHY OF ARTERIOVENOUS SHUNT N/A 01/19/2022    Procedure: Fistulogram with Possible Intervention;  Surgeon: Raz Maher MD;  Location: [facility] CATH/EP LAB;  Service: Cardiothoracic;  Laterality: N/A;    APPENDECTOMY      AV FISTULA PLACEMENT Left 2018    BACK SURGERY      BLADDER FULGURATION N/A 06/16/2020    Procedure: FULGURATION, BLADDER;  Surgeon: Damari Barber Jr., MD;  Location: [facility];  Service: Urology;  Laterality: N/A;    carpel tunnel surgery once on each hand      CERVICAL FUSION      x 4    cervix repair      CHOLECYSTECTOMY  2000    COLECTOMY      CYSTOSCOPY W/ RETROGRADES  06/16/2020    Procedure: CYSTOSCOPY, WITH RETROGRADE PYELOGRAM;  Surgeon: Damari Barber Jr., MD;  Location: [facility] OR;  Service: Urology;;    CYSTOSCOPY W/ RETROGRADES Right 04/20/2021    Procedure: CYSTOSCOPY, WITH RETROGRADE PYELOGRAM;  Surgeon: Damari Barber Jr., MD;  Location: [facility] OR;  Service:  Urology;  Laterality: Right;    CYSTOSCOPY W/ URETERAL STENT PLACEMENT Right 03/16/2021    Procedure: CYSTOSCOPY, WITH URETERAL STENT INSERTION;  Surgeon: Damari Barber Jr., MD;  Location: [facility] OR;  Service: Urology;  Laterality: Right;    CYSTOSCOPY W/ URETERAL STENT PLACEMENT Right 05/18/2021    Procedure: CYSTOSCOPY, WITH URETERAL STENT INSERTION;  Surgeon: Damari Barber Jr., MD;  Location: [facility] OR;  Service: Urology;  Laterality: Right;    CYSTOSCOPY W/ URETERAL STENT PLACEMENT Right 06/22/2022    Procedure: CYSTOSCOPY, WITH URETERAL STENT INSERTION;  Surgeon: Gaby Garcia MD;  Location: [facility] OR;  Service: Urology;  Laterality: Right;  cysto stent exchange    CYSTOSCOPY W/ URETERAL STENT REMOVAL Right 04/20/2021    Procedure: CYSTOSCOPY, WITH URETERAL STENT REMOVAL;  Surgeon: Damari Barber Jr., MD;  Location: [facility] OR;  Service: Urology;  Laterality: Right;    CYSTOSCOPY WITH URETEROSCOPY, RETROGRADE PYELOGRAPHY, AND INSERTION OF STENT Right 03/30/2021    Procedure: CYSTOSCOPY, WITH RETROGRADE PYELOGRAM AND URETERAL STENT INSERTION;  Surgeon: Damari Barber Jr., MD;  Location: [facility] OR;  Service: Urology;  Laterality: Right;    CYSTOURETEROSCOPY WITH RETROGRADE PYELOGRAPHY AND INSERTION OF STENT INTO URETER Right 01/18/2022    Procedure: CYSTOURETEROSCOPY, WITH RETROGRADE PYELOGRAM AND URETERAL STENT INSERTION;  Surgeon: Damari Barber Jr., MD;  Location: [facility] OR;  Service: Urology;  Laterality: Right;    CYSTOURETEROSCOPY WITH RETROGRADE PYELOGRAPHY AND INSERTION OF STENT INTO URETER Right 8/30/2022    Procedure: CYSTOURETEROSCOPY, WITH RETROGRADE PYELOGRAM AND URETERAL STENT INSERTION;  Surgeon: Damari Barber Jr., MD;  Location: [facility] OR;  Service: Urology;  Laterality: Right;    DIAGNOSTIC LAPAROSCOPY N/A 02/13/2020    Procedure: LAPAROSCOPY, DIAGNOSTIC;  Surgeon: Robbi Lovell III, MD;  Location: [facility];  Service: General;  Laterality: N/A;    HYSTERECTOMY  1980    ILEOSTOMY N/A 02/13/2020     Procedure: CREATION, ILEOSTOMY Loop;  Surgeon: Robbi Lovell III, MD;  Location: [facility] OR;  Service: General;  Laterality: N/A;    ILEOSTOMY CLOSURE N/A 05/18/2020    Procedure: CLOSURE, ILEOSTOMY;  Surgeon: Robbi Lovell III, MD;  Location: [facility] OR;  Service: General;  Laterality: N/A;    LASER LITHOTRIPSY Right 03/30/2021    Procedure: LITHOTRIPSY, USING LASER;  Surgeon: Damari Barber Jr., MD;  Location: [facility] OR;  Service: Urology;  Laterality: Right;    LUMBAR LAMINECTOMY      LYSIS OF ADHESIONS N/A 02/13/2020    Procedure: LYSIS, ADHESIONS;  Surgeon: Robbi Lovell III, MD;  Location: [facility] OR;  Service: General;  Laterality: N/A;    NECK SURGERY      PERCUTANEOUS TRANSLUMINAL ANGIOPLASTY OF ARTERIOVENOUS FISTULA N/A 01/19/2022    Procedure: PTA, AV FISTULA;  Surgeon: Raz Maher MD;  Location: [facility] CATH/EP LAB;  Service: Cardiothoracic;  Laterality: N/A;    PHLEBOGRAPHY  02/28/2020    Procedure: VENOGRAM;  Surgeon: Robbi Lovell III, MD;  Location: [facility] OR;  Service: General;;    REMOVAL OF VASCULAR ACCESS PORT Right 02/13/2020    Procedure: REMOVAL, VASCULAR ACCESS PORT;  Surgeon: Robbi Lovell III, MD;  Location: [facility] OR;  Service: General;  Laterality: Right;    RETROGRADE PYELOGRAPHY  03/16/2021    Procedure: PYELOGRAM, RETROGRADE;  Surgeon: Damari Barber Jr., MD;  Location: [facility] OR;  Service: Urology;;    RETROGRADE PYELOGRAPHY Right 05/18/2021    Procedure: PYELOGRAM, RETROGRADE;  Surgeon: Damari Barber Jr., MD;  Location: [facility] OR;  Service: Urology;  Laterality: Right;    RETROGRADE PYELOGRAPHY  06/22/2022    Procedure: PYELOGRAM, RETROGRADE;  Surgeon: Gaby Garcia MD;  Location: [facility] OR;  Service: Urology;;    SHOULDER SURGERY      TONSILLECTOMY      URETEROSCOPIC REMOVAL OF URETERIC CALCULUS Right 03/30/2021    Procedure: REMOVAL, CALCULUS, URETER, URETEROSCOPIC;  Surgeon: Damari Barber Jr., MD;  Location: [facility] OR;  Service: Urology;  Laterality: Right;     URETEROSCOPIC REMOVAL OF URETERIC CALCULUS Right 2021    Procedure: REMOVAL, CALCULUS, URETER, URETEROSCOPIC;  Surgeon: Damari Barber Jr., MD;  Location: [facility] OR;  Service: Urology;  Laterality: Right;    URETEROSCOPIC REMOVAL OF URETERIC CALCULUS Right 2022    Procedure: REMOVAL, CALCULUS, URETER, URETEROSCOPIC;  Surgeon: Damari Barber Jr., MD;  Location: [facility] OR;  Service: Urology;  Laterality: Right;    vulva cancer       Family History   Problem Relation Age of Onset    Heart disease Mother     Hypertension Mother     Cancer Mother         lung    Heart disease Father     Heart disease Sister         Open heart surgery    No Known Problems Daughter      Social History     Tobacco Use    Smoking status: Former     Packs/day: 1.00     Years: 33.00     Pack years: 33.00     Types: Cigarettes     Quit date: 2000     Years since quittin.6    Smokeless tobacco: Former     Quit date:    Substance Use Topics    Alcohol use: No    Drug use: No       Review of patient's allergies indicates:   Allergen Reactions    Ciprofloxacin Other (See Comments)     Elevated liver enzymes      Pcn [penicillins] Anaphylaxis     TOLERATES ROCEPHIN WITHOUT DIFFICULTY       Outpatient meds:  Current Facility-Administered Medications on File Prior to Encounter   Medication Dose Route Frequency Provider Last Rate Last Admin    electrolyte-S (ISOLYTE)   Intravenous Continuous Raz Ruff MD        lactated ringers infusion   Intravenous Continuous uRy Beltre MD         Current Outpatient Medications on File Prior to Encounter   Medication Sig Dispense Refill    cholecalciferol, vitamin D3, (VITAMIN D3) 25 mcg (1,000 unit) capsule Take 1 capsule (1,000 Units total) by mouth once daily. 10 capsule 0    diltiaZEM (CARDIZEM CD) 120 MG Cp24 Take 1 capsule (120 mg total) by mouth once daily. 90 capsule 3    ferric citrate (AURYXIA) 210 mg iron Tab Take 2 tablets by mouth 3 (three) times daily with meals.       gabapentin (NEURONTIN) 100 MG capsule Take 100 mg by mouth 2 (two) times daily. HCS      hydrALAZINE (APRESOLINE) 50 MG tablet Take 1 tablet (50 mg total) by mouth every 8 (eight) hours. 270 tablet 0    levothyroxine (SYNTHROID) 88 MCG tablet TAKE 1 TABLET BEFORE BREAKFAST (Patient taking differently: Take 88 mcg by mouth before breakfast. TAKE 1 TABLET BEFORE BREAKFAST) 90 tablet 3    LORazepam (ATIVAN) 0.5 MG tablet Take 1 tablet (0.5 mg total) by mouth every evening. 90 tablet 1    promethazine (PHENERGAN) 25 MG tablet TAKE 1 TABLET (25 MG TOTAL) BY MOUTH 2 (TWO) TIMES DAILY AS NEEDED FOR NAUSEA. 60 tablet 0    rOPINIRole (REQUIP) 0.5 MG tablet Take 0.5 mg by mouth every 8 (eight) hours.      sertraline (ZOLOFT) 50 MG tablet Take 1 tablet (50 mg total) by mouth every evening. Take 50 mg by mouth every evening. 90 tablet 1    traMADoL (ULTRAM) 50 mg tablet Take 1 tablet (50 mg total) by mouth every 6 (six) hours as needed. 30 tablet 0    zinc gluconate 50 mg tablet Take 50 mg by mouth once daily.      [DISCONTINUED] miconazole (MICOTIN) 2 % cream Apply topically 2 (two) times daily. 28 g 0       Scheduled meds:   magnesium sulfate IVPB  1 g Intravenous Once    meropenem (MERREM) IVPB  500 mg Intravenous Q24H    potassium chloride  20 mEq Intravenous Once    sodium chloride 0.9%  10 mL Intravenous Q12H       Infusions:   sodium chloride 0.9%      NORepinephrine bitartrate-D5W         PRN meds:  calcium chloride IVPB, calcium chloride IVPB, calcium chloride IVPB, magnesium oxide, magnesium sulfate IVPB, magnesium sulfate IVPB, magnesium sulfate IVPB, magnesium sulfate IVPB, potassium chloride, potassium chloride, potassium chloride, potassium chloride, sodium chloride 0.9%, sodium phosphate IVPB, sodium phosphate IVPB, sodium phosphate IVPB, sodium phosphate IVPB, sodium phosphate IVPB    Review of Systems:  Review of Systems   Constitutional:  Negative for chills, fever, malaise/fatigue and weight loss.   HENT:   Negative for hearing loss and nosebleeds.    Eyes:  Negative for blurred vision, double vision and photophobia.   Respiratory:  Negative for cough, shortness of breath and wheezing.    Cardiovascular:  Negative for chest pain, palpitations and leg swelling.   Gastrointestinal:  Negative for abdominal pain, constipation, diarrhea, heartburn, nausea and vomiting.   Genitourinary:  Negative for dysuria, frequency and urgency.   Musculoskeletal:  Negative for falls, joint pain and myalgias.   Skin:  Negative for itching and rash.   Neurological:  Negative for dizziness, speech change, focal weakness, loss of consciousness and headaches.   Endo/Heme/Allergies:  Does not bruise/bleed easily.   Psychiatric/Behavioral:  Negative for depression and substance abuse. The patient is not nervous/anxious.      OBJECTIVE:     Vital Signs and IO (Last 24H):  Temp:  [100.5 °F (38.1 °C)]   Pulse:  [82-97]   Resp:  [15-23]   BP: ()/(53-70)   SpO2:  [91 %-95 %]   No intake/output data recorded.    Wt Readings from Last 5 Encounters:   09/07/22 68.5 kg (151 lb)   08/30/22 69.4 kg (153 lb)   08/27/22 65.8 kg (145 lb)   08/22/22 67.6 kg (149 lb)   07/20/22 64.4 kg (142 lb)         Physical Exam:  Physical Exam  Constitutional:       General: She is not in acute distress.     Appearance: She is well-developed. She is not diaphoretic.   HENT:      Head: Normocephalic and atraumatic.      Mouth/Throat:      Mouth: Mucous membranes are moist.   Eyes:      General: No scleral icterus.     Pupils: Pupils are equal, round, and reactive to light.   Cardiovascular:      Rate and Rhythm: Normal rate and regular rhythm.   Pulmonary:      Effort: Pulmonary effort is normal. No respiratory distress.      Breath sounds: No stridor.   Abdominal:      General: There is no distension.      Palpations: Abdomen is soft.   Musculoskeletal:         General: No deformity. Normal range of motion.      Cervical back: Neck supple.   Skin:     General: Skin  is warm and dry.      Findings: No erythema or rash.   Neurological:      Mental Status: She is alert and oriented to person, place, and time.      Cranial Nerves: No cranial nerve deficit.   Psychiatric:         Behavior: Behavior normal.       Body mass index is 23.65 kg/m².    Laboratory:  Recent Labs   Lab 09/07/22  1010      K 2.3*      CO2 25   BUN 20   CREATININE 5.5*   GLU 90       Recent Labs   Lab 09/07/22  1010   CALCIUM 7.8*   ALBUMIN 2.7*   MG 1.3*       Recent Labs   Lab 09/30/19  0820 11/15/19  0748   PTH, Intact 176.4 H 238.4 H       No results for input(s): POCTGLUCOSE in the last 168 hours.    Recent Labs   Lab 11/15/19  0748 01/22/20  0848 05/25/21  0847   Hemoglobin A1C Invalid A SEE COMMENT 4.6       Recent Labs   Lab 09/07/22  1010   WBC 21.26*   HGB 11.3*   HCT 35.5*      *   MCHC 31.8*   MONO 4.2  0.9       Recent Labs   Lab 09/07/22  1010   BILITOT 0.8   PROT 6.0   ALBUMIN 2.7*   ALKPHOS 135   ALT 12   AST 16       Recent Labs   Lab 04/26/22  1706 06/21/22  1306 06/22/22  1550   Color, UA Yellow Yellow Yellow   Appearance, UA Hazy A Cloudy A Cloudy A   pH, UA 7.0 7.0 7.0   Specific [place], UA 1.010 1.010 1.010   Protein, UA 1+ A 2+ A 2+ A   Glucose, UA Trace A Negative Negative   Ketones, UA Negative Negative Negative   Urobilinogen, UA Negative Negative Negative   Bilirubin (UA) Negative Negative Negative   Occult Blood UA 3+ A 2+ A 3+ A   Nitrite, UA Negative Negative Negative   RBC, UA >100 H 22 H >100 H   WBC, UA >100 H >100 H >100 H   Bacteria Negative Many A Negative   Hyaline Casts, UA 8 A 10 A 444 A       Recent Labs   Lab 03/16/20  1551 12/08/20 2024 06/22/22  1526 09/07/22  1022   POC PH 7.402 7.370 7.324 L  --    POC PCO2 39.9 39.0 53.8 H  --    POC HCO3 24.9 22.5 L 28.0  --    POC PO2 58 LL 85 40  --    POC SATURATED O2 90 L 96 69 L  --    POC BE 0 -3 2  --    Sample ARTERIAL ARTERIAL VENOUS VENOUS       Microbiology Results (last 7 days)        Procedure Component Value Units Date/Time    Blood culture [[id number]] Collected: 09/07/22 0945    Order Status: Sent Specimen: Blood from Peripheral, Forearm, Right Updated: 09/07/22 1058    Blood culture [[id number]] Collected: 09/07/22 1009    Order Status: Sent Specimen: Blood from Peripheral, Ankle, Right Updated: 09/07/22 1027          ASSESSMENT/PLAN:     ESRD on HD MWF via AVF  Continue current dialysis prescription.  Next HD per schedule.  Renal diet - low K, low phos.  No IVs or BP checks on access and/or non-dominant arm.    Anemia of CKD  Hgb and HCT are acceptable. Monitor.  Will provide ADAM and/or IV iron PRN.    MBD / Secondary HPT  Ca, phos, PTH and vitamin D levels are acceptable.   Phos binders, vitamin D analogues and calcimimetics as needed.    HTN  BP seem controlled.   Tolerate asymptomatic HTN up to -160.  Continue home meds.  Low sodium diet.    Ischemic colitis.  Plan per surgery.    Thank you for allowing us to participate in the care of your patient!   We will follow the patient and provide recommendations as needed.    Patient care time was spent personally by me on the following activities:   Obtaining a history.  Examination of patient.  Providing medical care at the patients bedside.  Developing a treatment plan with patient or surrogate and bedside caregivers.  Ordering and reviewing laboratory studies, radiographic studies, pulse oximetry.  Ordering and performing treatments and interventions.  Evaluation of patient's response to treatment.  Discussions with consultants while on the unit and immediately available to the patient.  Re-evaluation of the patient's condition.  Documentation in the medical record.     Jorge Webb MD    [facility]  [address] [place] [address]  [place] [address]    [phone] - tel  [phone] - fax    9/7/2022

## 2022-09-07 NOTE — TRANSFER OF CARE
"Anesthesia Transfer of Care Note    Patient: Shara Hutchins    Procedure(s) Performed: Procedure(s) (LRB):  LAPAROTOMY, EXPLORATORY, COLON RESECTION,OSTOMY (N/A)    Patient location: ICU    Anesthesia Type: general    Transport from OR: Continuous SpO2 monitoring in transport. Transported from OR on 2-3 L/min O2 by NC with adequate spontaneous ventilation    Post pain: adequate analgesia    Post assessment: no apparent anesthetic complications    Post vital signs: stable    Nausea/Vomiting: no nausea/vomiting    Complications: none    Transfer of care protocol was followed      Last vitals:   Visit Vitals  BP (!) 122/57   Pulse 86   Temp 36.7 °C (98.1 °F) (Oral)   Resp (!) 22   Ht 5' 7" (1.702 m)   Wt 68.5 kg (151 lb)   SpO2 (!) 94%   BMI 23.65 kg/m²     "

## 2022-09-07 NOTE — SUBJECTIVE & OBJECTIVE
Current Facility-Administered Medications on File Prior to Encounter   Medication    electrolyte-S (ISOLYTE)    lactated ringers infusion     Current Outpatient Medications on File Prior to Encounter   Medication Sig    cholecalciferol, vitamin D3, (VITAMIN D3) 25 mcg (1,000 unit) capsule Take 1 capsule (1,000 Units total) by mouth once daily.    diltiaZEM (CARDIZEM CD) 120 MG Cp24 Take 1 capsule (120 mg total) by mouth once daily.    ferric citrate (AURYXIA) 210 mg iron Tab Take 2 tablets by mouth 3 (three) times daily with meals.    gabapentin (NEURONTIN) 100 MG capsule Take 100 mg by mouth 2 (two) times daily. HCS    hydrALAZINE (APRESOLINE) 50 MG tablet Take 1 tablet (50 mg total) by mouth every 8 (eight) hours.    levothyroxine (SYNTHROID) 88 MCG tablet TAKE 1 TABLET BEFORE BREAKFAST (Patient taking differently: Take 88 mcg by mouth before breakfast. TAKE 1 TABLET BEFORE BREAKFAST)    LORazepam (ATIVAN) 0.5 MG tablet Take 1 tablet (0.5 mg total) by mouth every evening.    promethazine (PHENERGAN) 25 MG tablet TAKE 1 TABLET (25 MG TOTAL) BY MOUTH 2 (TWO) TIMES DAILY AS NEEDED FOR NAUSEA.    rOPINIRole (REQUIP) 0.5 MG tablet Take 0.5 mg by mouth every 8 (eight) hours.    sertraline (ZOLOFT) 50 MG tablet Take 1 tablet (50 mg total) by mouth every evening. Take 50 mg by mouth every evening.    traMADoL (ULTRAM) 50 mg tablet Take 1 tablet (50 mg total) by mouth every 6 (six) hours as needed.    zinc gluconate 50 mg tablet Take 50 mg by mouth once daily.    [DISCONTINUED] miconazole (MICOTIN) 2 % cream Apply topically 2 (two) times daily.       Review of patient's allergies indicates:   Allergen Reactions    Ciprofloxacin Other (See Comments)     Elevated liver enzymes      Pcn [penicillins] Anaphylaxis     TOLERATES ROCEPHIN WITHOUT DIFFICULTY       Past Medical History:   Diagnosis Date    Cellulitis of trunk     Perineum, numerous episodes    Chronic diarrhea 02/10/2020    Short-gut syndrome     End stage renal disease on dialysis 01/07/2018    Fatigue     Hypertension     Hypothyroidism 02/10/2020    Iron deficiency anemia due to chronic blood loss 01/07/2018    Pulmonary hypertension 03/20/2020    Vulvar cancer 01/07/2019     Past Surgical History:   Procedure Laterality Date    ABDOMINAL SURGERY      ANGIOGRAPHY OF ARTERIOVENOUS SHUNT N/A 01/19/2022    Procedure: Fistulogram with Possible Intervention;  Surgeon: Raz Maehr MD;  Location: [facility] CATH/EP LAB;  Service: Cardiothoracic;  Laterality: N/A;    APPENDECTOMY      AV FISTULA PLACEMENT Left 2018    BACK SURGERY      BLADDER FULGURATION N/A 06/16/2020    Procedure: FULGURATION, BLADDER;  Surgeon: Damari Barber Jr., MD;  Location: [facility] OR;  Service: Urology;  Laterality: N/A;    carpel tunnel surgery once on each hand      CERVICAL FUSION      x 4    cervix repair      CHOLECYSTECTOMY  2000    COLECTOMY      CYSTOSCOPY W/ RETROGRADES  06/16/2020    Procedure: CYSTOSCOPY, WITH RETROGRADE PYELOGRAM;  Surgeon: Damari Barber Jr., MD;  Location: [facility] OR;  Service: Urology;;    CYSTOSCOPY W/ RETROGRADES Right 04/20/2021    Procedure: CYSTOSCOPY, WITH RETROGRADE PYELOGRAM;  Surgeon: Damari Barber Jr., MD;  Location: [facility] OR;  Service: Urology;  Laterality: Right;    CYSTOSCOPY W/ URETERAL STENT PLACEMENT Right 03/16/2021    Procedure: CYSTOSCOPY, WITH URETERAL STENT INSERTION;  Surgeon: Damari Barber Jr., MD;  Location: [facility] OR;  Service: Urology;  Laterality: Right;    CYSTOSCOPY W/ URETERAL STENT PLACEMENT Right 05/18/2021    Procedure: CYSTOSCOPY, WITH URETERAL STENT INSERTION;  Surgeon: Damari Barber Jr., MD;  Location: [facility] OR;  Service: Urology;  Laterality: Right;    CYSTOSCOPY W/ URETERAL STENT PLACEMENT Right 06/22/2022    Procedure: CYSTOSCOPY, WITH URETERAL STENT INSERTION;  Surgeon: Gayb Garcia MD;  Location: [facility] OR;  Service: Urology;  Laterality: Right;  cysto stent exchange    CYSTOSCOPY W/  URETERAL STENT REMOVAL Right 04/20/2021    Procedure: CYSTOSCOPY, WITH URETERAL STENT REMOVAL;  Surgeon: Damari Barber Jr., MD;  Location: [facility] OR;  Service: Urology;  Laterality: Right;    CYSTOSCOPY WITH URETEROSCOPY, RETROGRADE PYELOGRAPHY, AND INSERTION OF STENT Right 03/30/2021    Procedure: CYSTOSCOPY, WITH RETROGRADE PYELOGRAM AND URETERAL STENT INSERTION;  Surgeon: Damari Barber Jr., MD;  Location: [facility] OR;  Service: Urology;  Laterality: Right;    CYSTOURETEROSCOPY WITH RETROGRADE PYELOGRAPHY AND INSERTION OF STENT INTO URETER Right 01/18/2022    Procedure: CYSTOURETEROSCOPY, WITH RETROGRADE PYELOGRAM AND URETERAL STENT INSERTION;  Surgeon: Daamri Barber Jr., MD;  Location: [facility] OR;  Service: Urology;  Laterality: Right;    CYSTOURETEROSCOPY WITH RETROGRADE PYELOGRAPHY AND INSERTION OF STENT INTO URETER Right 8/30/2022    Procedure: CYSTOURETEROSCOPY, WITH RETROGRADE PYELOGRAM AND URETERAL STENT INSERTION;  Surgeon: Damari Barber Jr., MD;  Location: [facility] OR;  Service: Urology;  Laterality: Right;    DIAGNOSTIC LAPAROSCOPY N/A 02/13/2020    Procedure: LAPAROSCOPY, DIAGNOSTIC;  Surgeon: Robbi Lovell III, MD;  Location: [facility] OR;  Service: General;  Laterality: N/A;    HYSTERECTOMY  1980    ILEOSTOMY N/A 02/13/2020    Procedure: CREATION, ILEOSTOMY Loop;  Surgeon: Robbi Lovell III, MD;  Location: [facility] OR;  Service: General;  Laterality: N/A;    ILEOSTOMY CLOSURE N/A 05/18/2020    Procedure: CLOSURE, ILEOSTOMY;  Surgeon: Robbi Lovell III, MD;  Location: [facility] OR;  Service: General;  Laterality: N/A;    LASER LITHOTRIPSY Right 03/30/2021    Procedure: LITHOTRIPSY, USING LASER;  Surgeon: Damari Barber Jr., MD;  Location: [facility] OR;  Service: Urology;  Laterality: Right;    LUMBAR LAMINECTOMY      LYSIS OF ADHESIONS N/A 02/13/2020    Procedure: LYSIS, ADHESIONS;  Surgeon: Robbi Lovell III, MD;  Location: [facility] OR;  Service: General;  Laterality: N/A;    NECK SURGERY       PERCUTANEOUS TRANSLUMINAL ANGIOPLASTY OF ARTERIOVENOUS FISTULA N/A 01/19/2022    Procedure: PTA, AV FISTULA;  Surgeon: Raz Maher MD;  Location: [facility] CATH/EP LAB;  Service: Cardiothoracic;  Laterality: N/A;    PHLEBOGRAPHY  02/28/2020    Procedure: VENOGRAM;  Surgeon: Robbi Lovell III, MD;  Location: [facility] OR;  Service: General;;    REMOVAL OF VASCULAR ACCESS PORT Right 02/13/2020    Procedure: REMOVAL, VASCULAR ACCESS PORT;  Surgeon: Robbi Lovell III, MD;  Location: [facility] OR;  Service: General;  Laterality: Right;    RETROGRADE PYELOGRAPHY  03/16/2021    Procedure: PYELOGRAM, RETROGRADE;  Surgeon: Damari Barber Jr., MD;  Location: [facility] OR;  Service: Urology;;    RETROGRADE PYELOGRAPHY Right 05/18/2021    Procedure: PYELOGRAM, RETROGRADE;  Surgeon: Damari Barber Jr., MD;  Location: [facility] OR;  Service: Urology;  Laterality: Right;    RETROGRADE PYELOGRAPHY  06/22/2022    Procedure: PYELOGRAM, RETROGRADE;  Surgeon: Gaby Garcia MD;  Location: [facility] OR;  Service: Urology;;    SHOULDER SURGERY      TONSILLECTOMY      URETEROSCOPIC REMOVAL OF URETERIC CALCULUS Right 03/30/2021    Procedure: REMOVAL, CALCULUS, URETER, URETEROSCOPIC;  Surgeon: Damari Barber Jr., MD;  Location: [facility];  Service: Urology;  Laterality: Right;    URETEROSCOPIC REMOVAL OF URETERIC CALCULUS Right 04/20/2021    Procedure: REMOVAL, CALCULUS, URETER, URETEROSCOPIC;  Surgeon: Damari Barber Jr., MD;  Location: [facility];  Service: Urology;  Laterality: Right;    URETEROSCOPIC REMOVAL OF URETERIC CALCULUS Right 8/30/2022    Procedure: REMOVAL, CALCULUS, URETER, URETEROSCOPIC;  Surgeon: Damari Barber Jr., MD;  Location: [facility];  Service: Urology;  Laterality: Right;    vulva cancer       Family History       Problem Relation (Age of Onset)    Cancer Mother    Heart disease Mother, Father, Sister    Hypertension Mother    No Known Problems Daughter          Tobacco Use    Smoking status: Former     Packs/day:  1.00     Years: 33.00     Pack years: 33.00     Types: Cigarettes     Quit date: 2000     Years since quittin.6    Smokeless tobacco: Former     Quit date:    Substance and Sexual Activity    Alcohol use: No    Drug use: No    Sexual activity: Not on file     Review of Systems   Constitutional:  Negative for activity change and fever.   Respiratory:  Negative for shortness of breath and wheezing.    Cardiovascular:  Negative for chest pain and leg swelling.   Gastrointestinal:  Positive for abdominal pain. Negative for abdominal distention.   Genitourinary:  Negative for difficulty urinating.   Skin:  Negative for color change.   Neurological:  Negative for dizziness and facial asymmetry.   Psychiatric/Behavioral:  Negative for agitation and confusion.    All other systems reviewed and are negative.  Objective:     Vital Signs (Most Recent):  Temp: 98.1 °F (36.7 °C) (22 1456)  Pulse: 86 (22 1332)  Resp: (!) 22 (22 1332)  BP: (!) 122/57 (22 1332)  SpO2: (!) 94 % (22 1332)   Vital Signs (24h Range):  Temp:  [98.1 °F (36.7 °C)-100.5 °F (38.1 °C)] 98.1 °F (36.7 °C)  Pulse:  [82-97] 86  Resp:  [15-23] 22  SpO2:  [91 %-95 %] 94 %  BP: ()/(53-70) 122/57     Weight: 68.5 kg (151 lb)  Body mass index is 23.65 kg/m².    Physical Exam  Vitals and nursing note reviewed.   HENT:      Head: Normocephalic and atraumatic.   Eyes:      Conjunctiva/sclera: Conjunctivae normal.   Neck:      Vascular: No JVD.   Cardiovascular:      Heart sounds: Normal heart sounds.   Pulmonary:      Effort: Pulmonary effort is normal.      Breath sounds: Normal breath sounds.   Abdominal:      General: Bowel sounds are normal. There is distension.      Palpations: Abdomen is soft.      Tenderness: There is abdominal tenderness. There is guarding.      Hernia: No hernia is present.      Comments: Multiple scars, generalized guarding   Skin:     General: Skin is warm.   Neurological:      Mental  Status: She is alert and oriented to person, place, and time.       Significant Labs:  I have reviewed all pertinent lab results within the past 24 hours.  CBC:   Recent Labs   Lab 09/07/22  1010   WBC 21.26*   RBC 3.22*   HGB 11.3*   HCT 35.5*      *   MCH 35.1*   MCHC 31.8*     CMP:   Recent Labs   Lab 09/07/22  1010   GLU 90   CALCIUM 7.8*   ALBUMIN 2.7*   PROT 6.0      K 2.3*   CO2 25      BUN 20   CREATININE 5.5*   ALKPHOS 135   ALT 12   AST 16   BILITOT 0.8       Significant Diagnostics:  I have reviewed all pertinent imaging results/findings within the past 24 hours.  Obvious portal venous gas on the CT scan by my interpretation as well as thickened colon with signs of pneumatosis

## 2022-09-07 NOTE — HPI
72-year-old history of multiple abdominal surgeries for ischemic colitis presents with diarrhea and severe abdominal pain.  She did note the diarrhea became bloody.  Diagnosed with ischemic colitis and I am consulted for surgery.

## 2022-09-07 NOTE — CARE UPDATE
"WEAN OFF OXYGEN   09/07/22 1800   PRE-TX-O2   SpO2 100 %   Pulse Oximetry Type Continuous   $ Pulse Oximetry - Multiple Charge Pulse Oximetry - Multiple   Pulse 87   Resp 16   Temp 98.5 °F (36.9 °C)   BP (!) 140/63   Education   $ Education DME Oxygen;15 min   Respiratory Evaluation   $ Care Plan Tech Time 15 min   $ Eval/Re-eval Charges Re-evaluation   IBW/VT Calculations   Height 5' 7" (1.702 m)   IBW/kg (Calculated) Female 61.6 kg   Low Range Vt 4cc/kg FEMALE 246.4 mL   Low Range Vt 6cc/kg FEMALE 369.6 mL   Adult Moderate Range vt 8cc/kg FEMALE 492.8 mL   Adult High Range Vt 10cc/kg FEMALE 616 mL     "

## 2022-09-07 NOTE — H&P
[facility] - Emergency Dept  Hospital Medicine  History & Physical    Patient Name: Shara Hutchins  MRN: [id number]  Patient Class: IP- Inpatient  Admission Date: 9/7/2022  Attending Physician: Aleksander Phan MD   Primary Care Provider: April Horton MD         Patient information was obtained from patient and ER records.     Subjective:     Principal Problem:Ischemic colitis    Chief Complaint:   Chief Complaint   Patient presents with    Rectal Bleeding     C/o LLQ abd pain with N/V x 4 days with onset of rectal bleeding this morning. Pt reports passing large clots. Pt is febrile and pale in triage.         HPI: 72 year old female with a history of ESRD on HD MWF, HTN, hypothyroidism, and chronic diarrhea s/p iliostomy and reversal 2 year back came with abdominal pain .  Started on Friday and complaining of , nausea, vomiting.  Initially she was having only diarrhea but later today she saw blood clots along with stool and came to the hospital.  She is on dialysis and she got dialysis yesterday. Patient has had decreased p.o. intake for the last few days almost not eating at all.  CT of the abdomen  pelvis  was done and found Portal venous air with pneumatosis within the descending colon and sigmoid colon with diffuse wall thickening of the colon. Findings are suggestive of ischemic colitis.  Patient was given IV fluid and IV potassium supplement in the ER.  She is having abdominal pain but not in chest pain or shortness of breath and she is awake alert oriented.  On examination patient abdomen is soft but tender at lower abdomen .  ED physician discussed with surgeon and they will bring her to OR.  Of note there is no free air.      Past Medical History:   Diagnosis Date    Cellulitis of trunk     Perineum, numerous episodes    Chronic diarrhea 02/10/2020    Short-gut syndrome    End stage renal disease on dialysis 01/07/2018    Fatigue     Hypertension     Hypothyroidism 02/10/2020    Iron  deficiency anemia due to chronic blood loss 01/07/2018    Pulmonary hypertension 03/20/2020    Vulvar cancer 01/07/2019       Past Surgical History:   Procedure Laterality Date    ABDOMINAL SURGERY      ANGIOGRAPHY OF ARTERIOVENOUS SHUNT N/A 01/19/2022    Procedure: Fistulogram with Possible Intervention;  Surgeon: Raz Maher MD;  Location: [facility] CATH/EP LAB;  Service: Cardiothoracic;  Laterality: N/A;    APPENDECTOMY      AV FISTULA PLACEMENT Left 2018    BACK SURGERY      BLADDER FULGURATION N/A 06/16/2020    Procedure: FULGURATION, BLADDER;  Surgeon: Damari Barebr Jr., MD;  Location: [facility] OR;  Service: Urology;  Laterality: N/A;    carpel tunnel surgery once on each hand      CERVICAL FUSION      x 4    cervix repair      CHOLECYSTECTOMY  2000    COLECTOMY      CYSTOSCOPY W/ RETROGRADES  06/16/2020    Procedure: CYSTOSCOPY, WITH RETROGRADE PYELOGRAM;  Surgeon: Damari Barber Jr., MD;  Location: [facility] OR;  Service: Urology;;    CYSTOSCOPY W/ RETROGRADES Right 04/20/2021    Procedure: CYSTOSCOPY, WITH RETROGRADE PYELOGRAM;  Surgeon: Damari Barber Jr., MD;  Location: [facility] OR;  Service: Urology;  Laterality: Right;    CYSTOSCOPY W/ URETERAL STENT PLACEMENT Right 03/16/2021    Procedure: CYSTOSCOPY, WITH URETERAL STENT INSERTION;  Surgeon: Damari Barber Jr., MD;  Location: [facility] OR;  Service: Urology;  Laterality: Right;    CYSTOSCOPY W/ URETERAL STENT PLACEMENT Right 05/18/2021    Procedure: CYSTOSCOPY, WITH URETERAL STENT INSERTION;  Surgeon: Damari Barber Jr., MD;  Location: [facility] OR;  Service: Urology;  Laterality: Right;    CYSTOSCOPY W/ URETERAL STENT PLACEMENT Right 06/22/2022    Procedure: CYSTOSCOPY, WITH URETERAL STENT INSERTION;  Surgeon: Gaby Garcia MD;  Location: [facility] OR;  Service: Urology;  Laterality: Right;  cysto stent exchange    CYSTOSCOPY W/ URETERAL STENT REMOVAL Right 04/20/2021    Procedure: CYSTOSCOPY, WITH URETERAL STENT REMOVAL;  Surgeon: Damari Barber  MD Nuria;  Location: [facility] OR;  Service: Urology;  Laterality: Right;    CYSTOSCOPY WITH URETEROSCOPY, RETROGRADE PYELOGRAPHY, AND INSERTION OF STENT Right 03/30/2021    Procedure: CYSTOSCOPY, WITH RETROGRADE PYELOGRAM AND URETERAL STENT INSERTION;  Surgeon: Damari Barber Jr., MD;  Location: [facility] OR;  Service: Urology;  Laterality: Right;    CYSTOURETEROSCOPY WITH RETROGRADE PYELOGRAPHY AND INSERTION OF STENT INTO URETER Right 01/18/2022    Procedure: CYSTOURETEROSCOPY, WITH RETROGRADE PYELOGRAM AND URETERAL STENT INSERTION;  Surgeon: Damari Barber Jr., MD;  Location: [facility] OR;  Service: Urology;  Laterality: Right;    CYSTOURETEROSCOPY WITH RETROGRADE PYELOGRAPHY AND INSERTION OF STENT INTO URETER Right 8/30/2022    Procedure: CYSTOURETEROSCOPY, WITH RETROGRADE PYELOGRAM AND URETERAL STENT INSERTION;  Surgeon: Damari Barber Jr., MD;  Location: [facility] OR;  Service: Urology;  Laterality: Right;    DIAGNOSTIC LAPAROSCOPY N/A 02/13/2020    Procedure: LAPAROSCOPY, DIAGNOSTIC;  Surgeon: Robbi Lovell III, MD;  Location: [facility];  Service: General;  Laterality: N/A;    HYSTERECTOMY  1980    ILEOSTOMY N/A 02/13/2020    Procedure: CREATION, ILEOSTOMY Loop;  Surgeon: Robbi Lovell III, MD;  Location: [facility] OR;  Service: General;  Laterality: N/A;    ILEOSTOMY CLOSURE N/A 05/18/2020    Procedure: CLOSURE, ILEOSTOMY;  Surgeon: Robbi Lovell III, MD;  Location: [facility] OR;  Service: General;  Laterality: N/A;    LASER LITHOTRIPSY Right 03/30/2021    Procedure: LITHOTRIPSY, USING LASER;  Surgeon: Damari Barber Jr., MD;  Location: [facility] OR;  Service: Urology;  Laterality: Right;    LUMBAR LAMINECTOMY      LYSIS OF ADHESIONS N/A 02/13/2020    Procedure: LYSIS, ADHESIONS;  Surgeon: Robbi Lovell III, MD;  Location: [facility] OR;  Service: General;  Laterality: N/A;    NECK SURGERY      PERCUTANEOUS TRANSLUMINAL ANGIOPLASTY OF ARTERIOVENOUS FISTULA N/A 01/19/2022    Procedure: PTA, AV FISTULA;  Surgeon: Raz GRAHAM  MD Gunnar;  Location: [facility] CATH/EP LAB;  Service: Cardiothoracic;  Laterality: N/A;    PHLEBOGRAPHY  02/28/2020    Procedure: VENOGRAM;  Surgeon: Robbi Lovell III, MD;  Location: [facility] OR;  Service: General;;    REMOVAL OF VASCULAR ACCESS PORT Right 02/13/2020    Procedure: REMOVAL, VASCULAR ACCESS PORT;  Surgeon: Robbi Lovell III, MD;  Location: [facility] OR;  Service: General;  Laterality: Right;    RETROGRADE PYELOGRAPHY  03/16/2021    Procedure: PYELOGRAM, RETROGRADE;  Surgeon: Damari Barber Jr., MD;  Location: [facility] OR;  Service: Urology;;    RETROGRADE PYELOGRAPHY Right 05/18/2021    Procedure: PYELOGRAM, RETROGRADE;  Surgeon: Damari Barber Jr., MD;  Location: [facility];  Service: Urology;  Laterality: Right;    RETROGRADE PYELOGRAPHY  06/22/2022    Procedure: PYELOGRAM, RETROGRADE;  Surgeon: Gaby Garcia MD;  Location: [facility];  Service: Urology;;    SHOULDER SURGERY      TONSILLECTOMY      URETEROSCOPIC REMOVAL OF URETERIC CALCULUS Right 03/30/2021    Procedure: REMOVAL, CALCULUS, URETER, URETEROSCOPIC;  Surgeon: Damari Barber Jr., MD;  Location: [facility];  Service: Urology;  Laterality: Right;    URETEROSCOPIC REMOVAL OF URETERIC CALCULUS Right 04/20/2021    Procedure: REMOVAL, CALCULUS, URETER, URETEROSCOPIC;  Surgeon: Damari Barber Jr., MD;  Location: [facility];  Service: Urology;  Laterality: Right;    URETEROSCOPIC REMOVAL OF URETERIC CALCULUS Right 8/30/2022    Procedure: REMOVAL, CALCULUS, URETER, URETEROSCOPIC;  Surgeon: Damari Barber Jr., MD;  Location: [facility] OR;  Service: Urology;  Laterality: Right;    vulva cancer         Review of patient's allergies indicates:   Allergen Reactions    Ciprofloxacin Other (See Comments)     Elevated liver enzymes      Pcn [penicillins] Anaphylaxis     TOLERATES ROCEPHIN WITHOUT DIFFICULTY       Current Facility-Administered Medications on File Prior to Encounter   Medication    electrolyte-S (ISOLYTE)    lactated ringers infusion      Current Outpatient Medications on File Prior to Encounter   Medication Sig    cholecalciferol, vitamin D3, (VITAMIN D3) 25 mcg (1,000 unit) capsule Take 1 capsule (1,000 Units total) by mouth once daily.    diltiaZEM (CARDIZEM CD) 120 MG Cp24 Take 1 capsule (120 mg total) by mouth once daily.    ferric citrate (AURYXIA) 210 mg iron Tab Take 2 tablets by mouth 3 (three) times daily with meals.    gabapentin (NEURONTIN) 100 MG capsule Take 100 mg by mouth 2 (two) times daily. HCS    hydrALAZINE (APRESOLINE) 50 MG tablet Take 1 tablet (50 mg total) by mouth every 8 (eight) hours.    levothyroxine (SYNTHROID) 88 MCG tablet TAKE 1 TABLET BEFORE BREAKFAST (Patient taking differently: Take 88 mcg by mouth before breakfast. TAKE 1 TABLET BEFORE BREAKFAST)    LORazepam (ATIVAN) 0.5 MG tablet Take 1 tablet (0.5 mg total) by mouth every evening.    promethazine (PHENERGAN) 25 MG tablet TAKE 1 TABLET (25 MG TOTAL) BY MOUTH 2 (TWO) TIMES DAILY AS NEEDED FOR NAUSEA.    rOPINIRole (REQUIP) 0.5 MG tablet Take 0.5 mg by mouth every 8 (eight) hours.    sertraline (ZOLOFT) 50 MG tablet Take 1 tablet (50 mg total) by mouth every evening. Take 50 mg by mouth every evening.    traMADoL (ULTRAM) 50 mg tablet Take 1 tablet (50 mg total) by mouth every 6 (six) hours as needed.    zinc gluconate 50 mg tablet Take 50 mg by mouth once daily.    [DISCONTINUED] miconazole (MICOTIN) 2 % cream Apply topically 2 (two) times daily.     Family History       Problem Relation (Age of Onset)    Cancer Mother    Heart disease Mother, Father, Sister    Hypertension Mother    No Known Problems Daughter          Tobacco Use    Smoking status: Former     Packs/day: 1.00     Years: 33.00     Pack years: 33.00     Types: Cigarettes     Quit date: 2000     Years since quittin.6    Smokeless tobacco: Former     Quit date:    Substance and Sexual Activity    Alcohol use: No    Drug use: No    Sexual activity: Not on file      Review of Systems   Constitutional:  Negative for activity change and fever.   Respiratory:  Negative for shortness of breath and wheezing.    Cardiovascular:  Negative for chest pain and leg swelling.   Gastrointestinal:  Positive for abdominal pain. Negative for abdominal distention.   Genitourinary:  Negative for difficulty urinating.   Skin:  Negative for color change.   Neurological:  Negative for dizziness and facial asymmetry.   Psychiatric/Behavioral:  Negative for agitation and confusion.    All other systems reviewed and are negative.  Objective:     Vital Signs (Most Recent):  Temp: 98.1 °F (36.7 °C) (09/07/22 1456)  Pulse: 86 (09/07/22 1332)  Resp: (!) 22 (09/07/22 1332)  BP: (!) 122/57 (09/07/22 1332)  SpO2: (!) 94 % (09/07/22 1332)   Vital Signs (24h Range):  Temp:  [98.1 °F (36.7 °C)-100.5 °F (38.1 °C)] 98.1 °F (36.7 °C)  Pulse:  [82-97] 86  Resp:  [15-23] 22  SpO2:  [91 %-95 %] 94 %  BP: ()/(53-70) 122/57     Weight: 68.5 kg (151 lb)  Body mass index is 23.65 kg/m².    Physical Exam  Vitals and nursing note reviewed.   HENT:      Head: Normocephalic and atraumatic.   Eyes:      Conjunctiva/sclera: Conjunctivae normal.   Neck:      Vascular: No JVD.   Cardiovascular:      Heart sounds: Normal heart sounds.   Pulmonary:      Effort: Pulmonary effort is normal.      Breath sounds: Normal breath sounds.   Abdominal:      General: Bowel sounds are normal.      Palpations: Abdomen is soft.      Tenderness: There is abdominal tenderness. There is no guarding.   Skin:     General: Skin is warm.   Neurological:      Mental Status: She is alert and oriented to person, place, and time.           Significant Labs: All pertinent labs within the past 24 hours have been reviewed.  BMP:   Recent Labs   Lab 09/07/22  1010   GLU 90      K 2.3*      CO2 25   BUN 20   CREATININE 5.5*   CALCIUM 7.8*   MG 1.3*     CBC:   Recent Labs   Lab 09/07/22  1010   WBC 21.26*   HGB 11.3*   HCT 35.5*         CMP:   Recent Labs   Lab 09/07/22  1010      K 2.3*      CO2 25   GLU 90   BUN 20   CREATININE 5.5*   CALCIUM 7.8*   PROT 6.0   ALBUMIN 2.7*   BILITOT 0.8   ALKPHOS 135   AST 16   ALT 12   ANIONGAP 11       Significant Imaging: I have reviewed all pertinent imaging results/findings within the past 24 hours.    CT Abdomen Pelvis  Without Contrast  Status: Final result     MyChart Results Release    STACK Media Status: Active  Results Release     PACS Images for [facility] [place] Viewer     Show images for CT Abdomen Pelvis Without Contrast  CT Abdomen Pelvis  Without Contrast  Order: [id number]   Status: Final result      Visible to patient: Yes (seen)      Next appt: 09/09/2022 at 03:30 PM in Radiology ([id number])      0 Result Notes  Details    Reading Physician Reading Date Result Priority   Meredith Colunga MD  [phone] 9/7/2022      Narrative & Impression  CMS MANDATED QUALITY DATA - CT RADIATION - 436     All CT scans at this facility utilize dose modulation, iterative reconstruction, and/or weight based dosing when appropriate to reduce radiation dose to as low as reasonably achievable.     HISTORY: GI bleed, abdominal pain     COMPARISON: 7/7/2022     FINDINGS: Noncontrast axial images were obtained. Nonenhanced study is tailored for the detection of urolithiasis, and is insensitive for abnormalities of the solid organs, vasculature and hollow viscera.     CT ABDOMEN: There is coronary artery calcification. The lung bases are clear.     There is moderate portal venous air within the intrahepatic portal veins and main portal vein.     There is diffuse wall thickening of the colon with pneumatosis within the descending colon. Findings are suggestive of ischemic colitis. There is air within a vein within the left lower quadrant.     There is edema within the pericolonic fat.     The small bowel is not dilated. There are anastomotic sutures in the right lower quadrant.     The liver, spleen  and pancreas have a normal noncontrast appearance. The gallbladder is absent. The adrenal glands are normal.     The kidneys are small in size with numerous nonobstructing renal stones. There is a right ureteral stent in place. There is resolution of the right hydronephrosis.     There is moderate vascular calcification of the aorta and its branches. There is no mesenteric or retroperitoneal adenopathy.     There is levoscoliosis of the lumbar spine with multilevel degenerative changes. There are degenerative changes of both hips.     CT PELVIS: There is pneumatosis within the descending colon and sigmoid colon with diffuse wall thickening of the colon suggestive of ischemic colitis. There is minimal free fluid in the pelvis. The bladder is normal. The uterus is absent. There is moderate vascular calcification of the iliac arteries.     IMPRESSION: Portal venous air with pneumatosis within the descending colon and sigmoid colon with diffuse wall thickening of the colon. Findings are suggestive of ischemic colitis. There is no free air     Minimal free fluid within the pelvis     Bilateral renal calculi with right ureteral stent in place with resolution of the right hydronephrosis     Moderate vascular calcification of the aorta and iliac arteries     These findings were called to Dr. rausch in the emergency department at 11:45 AM     Electronically signed by:  Meredith Colunga MD  9/7/2022 11:50 AM CDT Workstation: [id number]         Assessment/Plan:     Active Hospital Problems    Diagnosis    *Ischemic colitis    ESRD on dialysis MWF    Severe dehydration due to chronic diarrhea/high output ileostomy and reversed    Emphysema lung    S/P ureteral stent placement    Essential hypertension    Status post reversal of ileostomy    Severe malnutrition    DARCI (acute kidney injury)    Hypothyroidism    Anemia of chronic disease    Anemia due to stage 5 chronic kidney disease treated with erythropoietin     Hisotry of Vulvar cancer     PLAN   NPO   IVF   Renal Cx for HD later   For OR today as per surgery   IV regino   Blood culture   K supplement given . Check q12  BP keep in higher range given ischemic colitis   Change to IV medications  IV synthroid     VTE Risk Mitigation (From admission, onward)         Ordered     IP VTE HIGH RISK PATIENT  Once         09/07/22 1258     Place sequential compression device  Until discontinued         09/07/22 1258                   Aleksander Phan MD  Department of Hospital Medicine   [facility] - Emergency Dept

## 2022-09-07 NOTE — SUBJECTIVE & OBJECTIVE
Past Medical History:   Diagnosis Date    Cellulitis of trunk     Perineum, numerous episodes    Chronic diarrhea 02/10/2020    Short-gut syndrome    End stage renal disease on dialysis 01/07/2018    Fatigue     Hypertension     Hypothyroidism 02/10/2020    Iron deficiency anemia due to chronic blood loss 01/07/2018    Pulmonary hypertension 03/20/2020    Vulvar cancer 01/07/2019       Past Surgical History:   Procedure Laterality Date    ABDOMINAL SURGERY      ANGIOGRAPHY OF ARTERIOVENOUS SHUNT N/A 01/19/2022    Procedure: Fistulogram with Possible Intervention;  Surgeon: Raz Maher MD;  Location: [facility] CATH/EP LAB;  Service: Cardiothoracic;  Laterality: N/A;    APPENDECTOMY      AV FISTULA PLACEMENT Left 2018    BACK SURGERY      BLADDER FULGURATION N/A 06/16/2020    Procedure: FULGURATION, BLADDER;  Surgeon: Damari Barber Jr., MD;  Location: [facility] OR;  Service: Urology;  Laterality: N/A;    carpel tunnel surgery once on each hand      CERVICAL FUSION      x 4    cervix repair      CHOLECYSTECTOMY  2000    COLECTOMY      CYSTOSCOPY W/ RETROGRADES  06/16/2020    Procedure: CYSTOSCOPY, WITH RETROGRADE PYELOGRAM;  Surgeon: Damari Barber Jr., MD;  Location: [facility] OR;  Service: Urology;;    CYSTOSCOPY W/ RETROGRADES Right 04/20/2021    Procedure: CYSTOSCOPY, WITH RETROGRADE PYELOGRAM;  Surgeon: Damari Barber Jr., MD;  Location: [facility] OR;  Service: Urology;  Laterality: Right;    CYSTOSCOPY W/ URETERAL STENT PLACEMENT Right 03/16/2021    Procedure: CYSTOSCOPY, WITH URETERAL STENT INSERTION;  Surgeon: Damari Barber Jr., MD;  Location: [facility] OR;  Service: Urology;  Laterality: Right;    CYSTOSCOPY W/ URETERAL STENT PLACEMENT Right 05/18/2021    Procedure: CYSTOSCOPY, WITH URETERAL STENT INSERTION;  Surgeon: Damari Barber Jr., MD;  Location: [facility] OR;  Service: Urology;  Laterality: Right;    CYSTOSCOPY W/ URETERAL STENT PLACEMENT Right 06/22/2022    Procedure: CYSTOSCOPY, WITH URETERAL STENT INSERTION;  Surgeon:  Gaby Garcia MD;  Location: [facility];  Service: Urology;  Laterality: Right;  cysto stent exchange    CYSTOSCOPY W/ URETERAL STENT REMOVAL Right 04/20/2021    Procedure: CYSTOSCOPY, WITH URETERAL STENT REMOVAL;  Surgeon: Damari Barber Jr., MD;  Location: [facility] OR;  Service: Urology;  Laterality: Right;    CYSTOSCOPY WITH URETEROSCOPY, RETROGRADE PYELOGRAPHY, AND INSERTION OF STENT Right 03/30/2021    Procedure: CYSTOSCOPY, WITH RETROGRADE PYELOGRAM AND URETERAL STENT INSERTION;  Surgeon: Damari Barber Jr., MD;  Location: [facility] OR;  Service: Urology;  Laterality: Right;    CYSTOURETEROSCOPY WITH RETROGRADE PYELOGRAPHY AND INSERTION OF STENT INTO URETER Right 01/18/2022    Procedure: CYSTOURETEROSCOPY, WITH RETROGRADE PYELOGRAM AND URETERAL STENT INSERTION;  Surgeon: Damari Barber Jr., MD;  Location: [facility];  Service: Urology;  Laterality: Right;    CYSTOURETEROSCOPY WITH RETROGRADE PYELOGRAPHY AND INSERTION OF STENT INTO URETER Right 8/30/2022    Procedure: CYSTOURETEROSCOPY, WITH RETROGRADE PYELOGRAM AND URETERAL STENT INSERTION;  Surgeon: Damari Barber Jr., MD;  Location: [facility];  Service: Urology;  Laterality: Right;    DIAGNOSTIC LAPAROSCOPY N/A 02/13/2020    Procedure: LAPAROSCOPY, DIAGNOSTIC;  Surgeon: Robbi Lovell III, MD;  Location: [facility];  Service: General;  Laterality: N/A;    HYSTERECTOMY  1980    ILEOSTOMY N/A 02/13/2020    Procedure: CREATION, ILEOSTOMY Loop;  Surgeon: Robbi Lovell III, MD;  Location: [facility] OR;  Service: General;  Laterality: N/A;    ILEOSTOMY CLOSURE N/A 05/18/2020    Procedure: CLOSURE, ILEOSTOMY;  Surgeon: Robbi Lovell III, MD;  Location: [facility] OR;  Service: General;  Laterality: N/A;    LASER LITHOTRIPSY Right 03/30/2021    Procedure: LITHOTRIPSY, USING LASER;  Surgeon: Damari Barber Jr., MD;  Location: [facility];  Service: Urology;  Laterality: Right;    LUMBAR LAMINECTOMY      LYSIS OF ADHESIONS N/A 02/13/2020    Procedure: LYSIS, ADHESIONS;   Surgeon: Robbi Lovell III, MD;  Location: [facility] OR;  Service: General;  Laterality: N/A;    NECK SURGERY      PERCUTANEOUS TRANSLUMINAL ANGIOPLASTY OF ARTERIOVENOUS FISTULA N/A 01/19/2022    Procedure: PTA, AV FISTULA;  Surgeon: Raz Maher MD;  Location: [facility] CATH/EP LAB;  Service: Cardiothoracic;  Laterality: N/A;    PHLEBOGRAPHY  02/28/2020    Procedure: VENOGRAM;  Surgeon: Robbi Lovell III, MD;  Location: [facility] OR;  Service: General;;    REMOVAL OF VASCULAR ACCESS PORT Right 02/13/2020    Procedure: REMOVAL, VASCULAR ACCESS PORT;  Surgeon: Robbi Lovell III, MD;  Location: [facility] OR;  Service: General;  Laterality: Right;    RETROGRADE PYELOGRAPHY  03/16/2021    Procedure: PYELOGRAM, RETROGRADE;  Surgeon: Damari Barber Jr., MD;  Location: [facility] OR;  Service: Urology;;    RETROGRADE PYELOGRAPHY Right 05/18/2021    Procedure: PYELOGRAM, RETROGRADE;  Surgeon: Damari Barber Jr., MD;  Location: [facility] OR;  Service: Urology;  Laterality: Right;    RETROGRADE PYELOGRAPHY  06/22/2022    Procedure: PYELOGRAM, RETROGRADE;  Surgeon: Gaby Garcia MD;  Location: [facility] OR;  Service: Urology;;    SHOULDER SURGERY      TONSILLECTOMY      URETEROSCOPIC REMOVAL OF URETERIC CALCULUS Right 03/30/2021    Procedure: REMOVAL, CALCULUS, URETER, URETEROSCOPIC;  Surgeon: Damari Barber Jr., MD;  Location: [facility] OR;  Service: Urology;  Laterality: Right;    URETEROSCOPIC REMOVAL OF URETERIC CALCULUS Right 04/20/2021    Procedure: REMOVAL, CALCULUS, URETER, URETEROSCOPIC;  Surgeon: Damari Barber Jr., MD;  Location: [facility] OR;  Service: Urology;  Laterality: Right;    URETEROSCOPIC REMOVAL OF URETERIC CALCULUS Right 8/30/2022    Procedure: REMOVAL, CALCULUS, URETER, URETEROSCOPIC;  Surgeon: Damari Barber Jr., MD;  Location: [facility] OR;  Service: Urology;  Laterality: Right;    vulva cancer         Review of patient's allergies indicates:   Allergen Reactions    Ciprofloxacin Other (See Comments)     Elevated liver  enzymes      Pcn [penicillins] Anaphylaxis     TOLERATES ROCEPHIN WITHOUT DIFFICULTY       Current Facility-Administered Medications on File Prior to Encounter   Medication    electrolyte-S (ISOLYTE)    lactated ringers infusion     Current Outpatient Medications on File Prior to Encounter   Medication Sig    cholecalciferol, vitamin D3, (VITAMIN D3) 25 mcg (1,000 unit) capsule Take 1 capsule (1,000 Units total) by mouth once daily.    diltiaZEM (CARDIZEM CD) 120 MG Cp24 Take 1 capsule (120 mg total) by mouth once daily.    ferric citrate (AURYXIA) 210 mg iron Tab Take 2 tablets by mouth 3 (three) times daily with meals.    gabapentin (NEURONTIN) 100 MG capsule Take 100 mg by mouth 2 (two) times daily. HCS    hydrALAZINE (APRESOLINE) 50 MG tablet Take 1 tablet (50 mg total) by mouth every 8 (eight) hours.    levothyroxine (SYNTHROID) 88 MCG tablet TAKE 1 TABLET BEFORE BREAKFAST (Patient taking differently: Take 88 mcg by mouth before breakfast. TAKE 1 TABLET BEFORE BREAKFAST)    LORazepam (ATIVAN) 0.5 MG tablet Take 1 tablet (0.5 mg total) by mouth every evening.    promethazine (PHENERGAN) 25 MG tablet TAKE 1 TABLET (25 MG TOTAL) BY MOUTH 2 (TWO) TIMES DAILY AS NEEDED FOR NAUSEA.    rOPINIRole (REQUIP) 0.5 MG tablet Take 0.5 mg by mouth every 8 (eight) hours.    sertraline (ZOLOFT) 50 MG tablet Take 1 tablet (50 mg total) by mouth every evening. Take 50 mg by mouth every evening.    traMADoL (ULTRAM) 50 mg tablet Take 1 tablet (50 mg total) by mouth every 6 (six) hours as needed.    zinc gluconate 50 mg tablet Take 50 mg by mouth once daily.    [DISCONTINUED] miconazole (MICOTIN) 2 % cream Apply topically 2 (two) times daily.     Family History       Problem Relation (Age of Onset)    Cancer Mother    Heart disease Mother, Father, Sister    Hypertension Mother    No Known Problems Daughter          Tobacco Use    Smoking status: Former     Packs/day: 1.00     Years: 33.00     Pack years: 33.00     Types: Cigarettes      Quit date: 2000     Years since quittin.6    Smokeless tobacco: Former     Quit date:    Substance and Sexual Activity    Alcohol use: No    Drug use: No    Sexual activity: Not on file     Review of Systems   Constitutional:  Negative for activity change and fever.   Respiratory:  Negative for shortness of breath and wheezing.    Cardiovascular:  Negative for chest pain and leg swelling.   Gastrointestinal:  Positive for abdominal pain. Negative for abdominal distention.   Genitourinary:  Negative for difficulty urinating.   Skin:  Negative for color change.   Neurological:  Negative for dizziness and facial asymmetry.   Psychiatric/Behavioral:  Negative for agitation and confusion.    All other systems reviewed and are negative.  Objective:     Vital Signs (Most Recent):  Temp: 98.1 °F (36.7 °C) (22 1456)  Pulse: 86 (22 1332)  Resp: (!) 22 (22 1332)  BP: (!) 122/57 (22 1332)  SpO2: (!) 94 % (22 1332)   Vital Signs (24h Range):  Temp:  [98.1 °F (36.7 °C)-100.5 °F (38.1 °C)] 98.1 °F (36.7 °C)  Pulse:  [82-97] 86  Resp:  [15-23] 22  SpO2:  [91 %-95 %] 94 %  BP: ()/(53-70) 122/57     Weight: 68.5 kg (151 lb)  Body mass index is 23.65 kg/m².    Physical Exam  Vitals and nursing note reviewed.   HENT:      Head: Normocephalic and atraumatic.   Eyes:      Conjunctiva/sclera: Conjunctivae normal.   Neck:      Vascular: No JVD.   Cardiovascular:      Heart sounds: Normal heart sounds.   Pulmonary:      Effort: Pulmonary effort is normal.      Breath sounds: Normal breath sounds.   Abdominal:      General: Bowel sounds are normal.      Palpations: Abdomen is soft.      Tenderness: There is abdominal tenderness. There is no guarding.   Skin:     General: Skin is warm.   Neurological:      Mental Status: She is alert and oriented to person, place, and time.           Significant Labs: All pertinent labs within the past 24 hours have been reviewed.  BMP:   Recent Labs   Lab  09/07/22  1010   GLU 90      K 2.3*      CO2 25   BUN 20   CREATININE 5.5*   CALCIUM 7.8*   MG 1.3*     CBC:   Recent Labs   Lab 09/07/22  1010   WBC 21.26*   HGB 11.3*   HCT 35.5*        CMP:   Recent Labs   Lab 09/07/22  1010      K 2.3*      CO2 25   GLU 90   BUN 20   CREATININE 5.5*   CALCIUM 7.8*   PROT 6.0   ALBUMIN 2.7*   BILITOT 0.8   ALKPHOS 135   AST 16   ALT 12   ANIONGAP 11       Significant Imaging: I have reviewed all pertinent imaging results/findings within the past 24 hours.    CT Abdomen Pelvis  Without Contrast  Status: Final result     Encapt Results Release    Mobile[employer] Status: Active  Results Release     PACS Images for Capt[employer]nSocial Viewer     Show images for CT Abdomen Pelvis Without Contrast  CT Abdomen Pelvis  Without Contrast  Order: [id number]  Status: Final result     Visible to patient: Yes (seen)     Next appt: 09/09/2022 at 03:30 PM in Radiology ([facility] [id number])     0 Result Notes  Details    Reading Physician Reading Date Result Priority   Meredith Colunga MD  [phone] 9/7/2022      Narrative & Impression  CMS MANDATED QUALITY DATA - CT RADIATION - 436     All CT scans at this facility utilize dose modulation, iterative reconstruction, and/or weight based dosing when appropriate to reduce radiation dose to as low as reasonably achievable.     HISTORY: GI bleed, abdominal pain     COMPARISON: 7/7/2022     FINDINGS: Noncontrast axial images were obtained. Nonenhanced study is tailored for the detection of urolithiasis, and is insensitive for abnormalities of the solid organs, vasculature and hollow viscera.     CT ABDOMEN: There is coronary artery calcification. The lung bases are clear.     There is moderate portal venous air within the intrahepatic portal veins and main portal vein.     There is diffuse wall thickening of the colon with pneumatosis within the descending colon. Findings are suggestive of ischemic colitis. There is air within a  vein within the left lower quadrant.     There is edema within the pericolonic fat.     The small bowel is not dilated. There are anastomotic sutures in the right lower quadrant.     The liver, spleen and pancreas have a normal noncontrast appearance. The gallbladder is absent. The adrenal glands are normal.     The kidneys are small in size with numerous nonobstructing renal stones. There is a right ureteral stent in place. There is resolution of the right hydronephrosis.     There is moderate vascular calcification of the aorta and its branches. There is no mesenteric or retroperitoneal adenopathy.     There is levoscoliosis of the lumbar spine with multilevel degenerative changes. There are degenerative changes of both hips.     CT PELVIS: There is pneumatosis within the descending colon and sigmoid colon with diffuse wall thickening of the colon suggestive of ischemic colitis. There is minimal free fluid in the pelvis. The bladder is normal. The uterus is absent. There is moderate vascular calcification of the iliac arteries.     IMPRESSION: Portal venous air with pneumatosis within the descending colon and sigmoid colon with diffuse wall thickening of the colon. Findings are suggestive of ischemic colitis. There is no free air     Minimal free fluid within the pelvis     Bilateral renal calculi with right ureteral stent in place with resolution of the right hydronephrosis     Moderate vascular calcification of the aorta and iliac arteries     These findings were called to Dr. rausch in the emergency department at 11:45 AM     Electronically signed by:  Meredith Colunga MD  9/7/2022 11:50 AM CDT Workstation: [id number]

## 2022-09-08 NOTE — PLAN OF CARE
Counts include 234 beds at the [facility]  Initial Discharge Assessment       Primary Care Provider: April Horton MD    Admission Diagnosis: Ischemic colitis [K55.9]    Admission Date: 9/7/2022  Expected Discharge Date: TBD     met with Pt at bedside to complete discharge assessment. Pt AAOx4s. Demographics, PCP, and insurance verified. Pt is a resident of [facility]. No home health. Pt receives dialysis ([facility], Dr. Jina stevens). Pt reports ability to complete ADLs without assistance, but Pt has a rolling walker. Pt verbalized plan to discharge home via family transport. Pt has no other needs to be addressed at this time.    Discharge Barriers Identified: None    Payor: [employer] / Plan: [place] [employer] [employer] / Product Type: Government /     Extended Emergency Contact Information  Primary Emergency Contact: demario jimenez  Mobile Phone: [phone]  Relation: Daughter   needed? No  Secondary Emergency Contact: Stephanie Aguiar   [facility]  Home Phone: [phone]  Relation: Relative   needed? No    Discharge Plan A: Assisted Living  Discharge Plan B: Assisted Living      [id number] - [place] - [address]  [address]  [facility] [address]  Phone: [phone] Fax: [phone]    [facility] - [place], [place] - [address]  [address]  [address]  [place] [address]  Phone: [phone] Fax: [phone]      Initial Assessment (most recent)       Adult Discharge Assessment - 09/08/22 1059          Discharge Assessment    Assessment Type Discharge Planning Assessment     Confirmed/corrected address, phone number and insurance Yes     Confirmed Demographics Correct on Facesheet     Source of Information patient     Does patient/caregiver understand observation status Yes     Communicated RODRIGO with patient/caregiver Yes     Lives With alone;facility resident   [facility] resident    Facility Arrived From: [facility]ates     Do you expect to return to your current living situation? Yes     Do you have help at home or someone to help you manage your care at home? Yes     Who are your caregiver(s) and their phone number(s)? demario jimenez (Daughter)   [phone] (Mobile)     Prior to hospitilization cognitive status: Alert/Oriented     Current cognitive status: Alert/Oriented     Walking or Climbing Stairs Difficulty none   Pt has rolling walker and facility has mobility equipment; Pt does not use any equipement.    Dressing/Bathing Difficulty none     Home Accessibility wheelchair accessible     Home Layout Able to live on 1st floor     Equipment Currently Used at Home none   Pt has rolling walking; not currently used    Readmission within 30 days? Yes     Patient currently being followed by outpatient case management? No     Do you currently have service(s) that help you manage your care at home? No     Do you take prescription medications? Yes     Do you have prescription coverage? Yes     Coverage [facility] [place] [employer]     Do you have any problems affording any of your prescribed medications? No     Is the patient taking medications as prescribed? yes     Who is going to help you get home at discharge? demario jimenez (Daughter)   [phone] (Mobile) or Facility     How do you get to doctors appointments? family or friend will provide;agency     Are you on dialysis? Yes     Dialysis Name and Scheduled days [facility], Dr. Muro following     Do you take coumadin? No     Discharge Plan A Assisted Living     Discharge Plan B Assisted Living     DME Needed Upon Discharge  none     Discharge Plan discussed with: Patient     Discharge Barriers Identified None        Physical Activity    On average, how many days per week do you engage in moderate to strenuous exercise (like a brisk walk)? 7 days     On average, how many minutes do you engage in exercise at this level? 60 min        Financial Resource Strain     How hard is it for you to pay for the very basics like food, housing, medical care, and heating? Not hard at all        Housing Stability    In the last 12 months, was there a time when you were not able to pay the mortgage or rent on time? No     In the last 12 months, how many places have you lived? 1     In the last 12 months, was there a time when you did not have a steady place to sleep or slept in a shelter (including now)? No        Transportation Needs    In the past 12 months, has lack of transportation kept you from medical appointments or from getting medications? No     In the past 12 months, has lack of transportation kept you from meetings, work, or from getting things needed for daily living? No        Food Insecurity    Within the past 12 months, you worried that your food would run out before you got the money to buy more. Never true     Within the past 12 months, the food you bought just didn't last and you didn't have money to get more. Never true        Stress    Do you feel stress - tense, restless, nervous, or anxious, or unable to sleep at night because your mind is troubled all the time - these days? Not at all        Social Connections    In a typical week, how many times do you talk on the phone with family, friends, or neighbors? More than three times a week     How often do you get together with friends or relatives? More than three times a week     How often do you attend Spiritism or Moravian services? More than 4 times per year     Do you belong to any clubs or organizations such as Spiritism groups, unions, fraternal or athletic groups, or school groups? Yes     How often do you attend meetings of the clubs or organizations you belong to? More than 4 times per year     Are you , , , , never , or living with a partner?         Alcohol Use    Q1: How often do you have a drink containing alcohol? Monthly or less     Q2: How many drinks  containing alcohol do you have on a typical day when you are drinking? 1 or 2     Q3: How often do you have six or more drinks on one occasion? Never        Relationship/Environment    Name(s) of Who Lives With Patient Pt is a resident of [facility]

## 2022-09-08 NOTE — HOSPITAL COURSE
Patient is seen and examined  She complained of pain at the operative site  Colostomy in place and no stool  BP stable without vasopressor support.    9/9  Still having pains. Colostomy some blood , no stool   BP stable . WBC better. May soon need PRBC    9/10  Sitting up   BP OK . Will do HD . Colostomy with some drainage appear liquid stool    9/11  Appear dehydrated . Large stool in the bag . BP stable. Eating well    9/12  Colostomy with stool . Getting HD now .  Increased in drain output . ++ noted . Hb stable    Hospital course   Admitted with a ischemic colitis and colectomy and ostomy was done .  No signs of infection and antibiotics discontinued.  On examination patient was walking with physical therapy.  Home health arranged.  Patient wants to go to [facility]  Nephrology wants to do hemodialysis and patient also requesting to get discharged tomorrow after dialysis

## 2022-09-08 NOTE — ANESTHESIA POSTPROCEDURE EVALUATION
Anesthesia Post Evaluation    Patient: Shara Hutchins    Procedure(s) Performed: Procedure(s) (LRB):  LAPAROTOMY, EXPLORATORY, COLON RESECTION,OSTOMY (N/A)    Final Anesthesia Type: general      Patient location during evaluation: ICU  Patient participation: Yes- Able to Participate  Level of consciousness: awake and alert and oriented  Post-procedure vital signs: reviewed and stable  Pain management: adequate  Airway patency: patent    PONV status at discharge: No PONV  Anesthetic complications: no      Cardiovascular status: blood pressure returned to baseline and hemodynamically stable  Respiratory status: unassisted, spontaneous ventilation and nasal cannula  Hydration status: euvolemic  Follow-up not needed.          Vitals Value Taken Time   /57 09/07/22 2045   Temp 36.9 °C (98.5 °F) 09/07/22 1800   Pulse 86 09/07/22 2046   Resp 19 09/07/22 2046   SpO2 100 % 09/07/22 2046   Vitals shown include unvalidated device data.      No case tracking events are documented in the log.      Pain/Kvng Score: Pain Rating Prior to Med Admin: 7 (9/7/2022  6:50 PM)

## 2022-09-08 NOTE — SUBJECTIVE & OBJECTIVE
Interval History:     Review of Systems  As per subjective   Objective:     Vital Signs (Most Recent):  Temp: 98 °F (36.7 °C) (09/08/22 1115)  Pulse: 85 (09/08/22 1230)  Resp: 16 (09/08/22 1322)  BP: (!) 98/48 (09/08/22 1230)  SpO2: 100 % (09/08/22 1230)   Vital Signs (24h Range):  Temp:  [97.7 °F (36.5 °C)-98.7 °F (37.1 °C)] 98 °F (36.7 °C)  Pulse:  [81-94] 85  Resp:  [10-30] 16  SpO2:  [89 %-100 %] 100 %  BP: ()/(48-64) 98/48     Weight: 70.9 kg (156 lb 3.2 oz)  Body mass index is 24.46 kg/m².    Intake/Output Summary (Last 24 hours) at 9/8/2022 1456  Last data filed at 9/8/2022 0641  Gross per 24 hour   Intake 1420.59 ml   Output 500 ml   Net 920.59 ml      Physical Exam  Vitals and nursing note reviewed.   HENT:      Head: Normocephalic and atraumatic.   Eyes:      Conjunctiva/sclera: Conjunctivae normal.   Neck:      Vascular: No JVD.   Cardiovascular:      Heart sounds: Normal heart sounds.   Pulmonary:      Effort: Pulmonary effort is normal.      Breath sounds: Normal breath sounds.   Abdominal:      Palpations: Abdomen is soft.      Tenderness: There is abdominal tenderness.   Skin:     General: Skin is warm.   Neurological:      Mental Status: She is alert and oriented to person, place, and time.       Significant Labs: All pertinent labs within the past 24 hours have been reviewed.  CBC:   Recent Labs   Lab 09/07/22  1010 09/07/22  1643 09/07/22  1803 09/08/22  0605   WBC 21.26*  --  17.39* 16.22*  16.22*   HGB 11.3*  --  9.5* 9.2*  9.2*   HCT 35.5* 25* 29.9* 28.7*  28.7*     --  140* 144*  144*     CMP:   Recent Labs   Lab 09/07/22  1010 09/07/22  1803 09/08/22  0014 09/08/22  0605    139  --  139  139   K 2.3* 2.9* 3.1* 3.8  3.8    105  --  104  104   CO2 25 22*  --  23  23   GLU 90 77  --  66*  66*   BUN 20 20  --  27*  27*   CREATININE 5.5* 5.4*  --  6.4*  6.4*   CALCIUM 7.8* 7.6*  --  7.8*  7.8*   PROT 6.0 4.5*  --   --    ALBUMIN 2.7* 2.1*  --   --    BILITOT 0.8  0.9  --   --    ALKPHOS 135 97  --   --    AST 16 16  --   --    ALT 12 9*  --   --    ANIONGAP 11 12  --  12  12     Cardiac Markers: No results for input(s): CKMB, MYOGLOBIN, BNP, TROPISTAT in the last 48 hours.    Significant Imaging: I have reviewed all pertinent imaging results/findings within the past 24 hours.

## 2022-09-08 NOTE — CARE UPDATE
Wean    09/08/22 0758   PRE-TX-O2   O2 Device (Oxygen Therapy) nasal cannula   Flow (L/min) 2   Pulse Oximetry Type Continuous   $ Pulse Oximetry - Multiple Charge Pulse Oximetry - Multiple   Education   $ Education DME Oxygen;15 min   Respiratory Evaluation   $ Care Plan Tech Time 15 min   $ Eval/Re-eval Charges Re-evaluation   oxygen

## 2022-09-08 NOTE — CONSULTS
Nephrology Progress Note        Patient Name: Shara Hutchins  MRN: [id number]    Patient Class: IP- Inpatient   Admission Date: 9/7/2022  Length of Stay: 1 days  Date of Service: 9/8/2022    Attending Physician: Aleksander Phan MD  Primary Care Provider: April Horton MD    Reason for Consult: ESRD/ANEMIA/HTN/SHPT/ischemic colitis    SUBJECTIVE:     HPI: 72-year-old ESRD on HD MWF and history of multiple abdominal surgeries for ischemic colitis presents with bloody diarrhea and severe abdominal pain, a was diagnosed with ischemic colitis and is going for emergent surgery.    9/8  labs reviewed, K+ better.  No acute clearance needs today.  C/o post op pain, no other complaints.  Next HD tomorrow.    Past Medical History:   Diagnosis Date    Cellulitis of trunk     Perineum, numerous episodes    Chronic diarrhea 02/10/2020    Short-gut syndrome    End stage renal disease on dialysis 01/07/2018    Fatigue     Hypertension     Hypothyroidism 02/10/2020    Iron deficiency anemia due to chronic blood loss 01/07/2018    Pulmonary hypertension 03/20/2020    Vulvar cancer 01/07/2019     Past Surgical History:   Procedure Laterality Date    ABDOMINAL SURGERY      ANGIOGRAPHY OF ARTERIOVENOUS SHUNT N/A 01/19/2022    Procedure: Fistulogram with Possible Intervention;  Surgeon: Raz Maher MD;  Location: [facility] CATH/EP LAB;  Service: Cardiothoracic;  Laterality: N/A;    APPENDECTOMY      AV FISTULA PLACEMENT Left 2018    BACK SURGERY      BLADDER FULGURATION N/A 06/16/2020    Procedure: FULGURATION, BLADDER;  Surgeon: Damari Barber Jr., MD;  Location: [facility];  Service: Urology;  Laterality: N/A;    carpel tunnel surgery once on each hand      CERVICAL FUSION      x 4    cervix repair      CHOLECYSTECTOMY  2000    COLECTOMY      CYSTOSCOPY W/ RETROGRADES  06/16/2020    Procedure: CYSTOSCOPY, WITH RETROGRADE PYELOGRAM;  Surgeon: Damari Barber Jr., MD;  Location: [facility] OR;  Service: Urology;;    CYSTOSCOPY W/ RETROGRADES Right  04/20/2021    Procedure: CYSTOSCOPY, WITH RETROGRADE PYELOGRAM;  Surgeon: Damari Barber Jr., MD;  Location: [facility] OR;  Service: Urology;  Laterality: Right;    CYSTOSCOPY W/ URETERAL STENT PLACEMENT Right 03/16/2021    Procedure: CYSTOSCOPY, WITH URETERAL STENT INSERTION;  Surgeon: Damari Barber Jr., MD;  Location: [facility] OR;  Service: Urology;  Laterality: Right;    CYSTOSCOPY W/ URETERAL STENT PLACEMENT Right 05/18/2021    Procedure: CYSTOSCOPY, WITH URETERAL STENT INSERTION;  Surgeon: Damari Barber Jr., MD;  Location: [facility] OR;  Service: Urology;  Laterality: Right;    CYSTOSCOPY W/ URETERAL STENT PLACEMENT Right 06/22/2022    Procedure: CYSTOSCOPY, WITH URETERAL STENT INSERTION;  Surgeon: Gaby Garcia MD;  Location: [facility] OR;  Service: Urology;  Laterality: Right;  cysto stent exchange    CYSTOSCOPY W/ URETERAL STENT REMOVAL Right 04/20/2021    Procedure: CYSTOSCOPY, WITH URETERAL STENT REMOVAL;  Surgeon: Damari Barber Jr., MD;  Location: [facility] OR;  Service: Urology;  Laterality: Right;    CYSTOSCOPY WITH URETEROSCOPY, RETROGRADE PYELOGRAPHY, AND INSERTION OF STENT Right 03/30/2021    Procedure: CYSTOSCOPY, WITH RETROGRADE PYELOGRAM AND URETERAL STENT INSERTION;  Surgeon: Damari Barber Jr., MD;  Location: [facility] OR;  Service: Urology;  Laterality: Right;    CYSTOURETEROSCOPY WITH RETROGRADE PYELOGRAPHY AND INSERTION OF STENT INTO URETER Right 01/18/2022    Procedure: CYSTOURETEROSCOPY, WITH RETROGRADE PYELOGRAM AND URETERAL STENT INSERTION;  Surgeon: Damari Barber Jr., MD;  Location: [facility];  Service: Urology;  Laterality: Right;    CYSTOURETEROSCOPY WITH RETROGRADE PYELOGRAPHY AND INSERTION OF STENT INTO URETER Right 8/30/2022    Procedure: CYSTOURETEROSCOPY, WITH RETROGRADE PYELOGRAM AND URETERAL STENT INSERTION;  Surgeon: Dmaari Barber Jr., MD;  [facility];  Service: Urology;  Laterality: Right;    DIAGNOSTIC LAPAROSCOPY N/A 02/13/2020    Procedure: LAPAROSCOPY, DIAGNOSTIC;  Surgeon:  Robbi Lovell III, MD;  Location: [facility] OR;  Service: General;  Laterality: N/A;    HYSTERECTOMY  1980    ILEOSTOMY N/A 02/13/2020    Procedure: CREATION, ILEOSTOMY Loop;  Surgeon: Robbi Lovell III, MD;  Location: [facility] OR;  Service: General;  Laterality: N/A;    ILEOSTOMY CLOSURE N/A 05/18/2020    Procedure: CLOSURE, ILEOSTOMY;  Surgeon: Robbi Lovell III, MD;  Location: [facility] OR;  Service: General;  Laterality: N/A;    LASER LITHOTRIPSY Right 03/30/2021    Procedure: LITHOTRIPSY, USING LASER;  Surgeon: Damari Barber Jr., MD;  Location: [facility] OR;  Service: Urology;  Laterality: Right;    LUMBAR LAMINECTOMY      LYSIS OF ADHESIONS N/A 02/13/2020    Procedure: LYSIS, ADHESIONS;  Surgeon: Robbi Lovell III, MD;  Location: [facility] OR;  Service: General;  Laterality: N/A;    NECK SURGERY      OSTEOTOMY  9/7/2022    Procedure: OSTEOTOMY;  Surgeon: Brigette Chavez MD;  Location: [facility] OR;  Service: General;;    PERCUTANEOUS TRANSLUMINAL ANGIOPLASTY OF ARTERIOVENOUS FISTULA N/A 01/19/2022    Procedure: PTA, AV FISTULA;  Surgeon: Raz Maher MD;  Location: [facility] CATH/EP LAB;  Service: Cardiothoracic;  Laterality: N/A;    PHLEBOGRAPHY  02/28/2020    Procedure: VENOGRAM;  Surgeon: Robbi Lovell III, MD;  Location: [facility] OR;  Service: General;;    REMOVAL OF VASCULAR ACCESS PORT Right 02/13/2020    Procedure: REMOVAL, VASCULAR ACCESS PORT;  Surgeon: Robbi Lovell III, MD;  Location: [facility] OR;  Service: General;  Laterality: Right;    RETROGRADE PYELOGRAPHY  03/16/2021    Procedure: PYELOGRAM, RETROGRADE;  Surgeon: Damari Barber Jr., MD;  Location: [facility] OR;  Service: Urology;;    RETROGRADE PYELOGRAPHY Right 05/18/2021    Procedure: PYELOGRAM, RETROGRADE;  Surgeon: Damari Barber Jr., MD;  Location: [facility] OR;  Service: Urology;  Laterality: Right;    RETROGRADE PYELOGRAPHY  06/22/2022    Procedure: PYELOGRAM, RETROGRADE;  Surgeon: Gaby Garcia MD;  Location: [facility];  Service: Urology;;     SHOULDER SURGERY      TONSILLECTOMY      URETEROSCOPIC REMOVAL OF URETERIC CALCULUS Right 2021    Procedure: REMOVAL, CALCULUS, URETER, URETEROSCOPIC;  Surgeon: Damari Barber Jr., MD;  Location: [facility] OR;  Service: Urology;  Laterality: Right;    URETEROSCOPIC REMOVAL OF URETERIC CALCULUS Right 2021    Procedure: REMOVAL, CALCULUS, URETER, URETEROSCOPIC;  Surgeon: Damari Barber Jr., MD;  Location: [facility] OR;  Service: Urology;  Laterality: Right;    URETEROSCOPIC REMOVAL OF URETERIC CALCULUS Right 2022    Procedure: REMOVAL, CALCULUS, URETER, URETEROSCOPIC;  Surgeon: Damari Barber Jr., MD;  Location: [facility] OR;  Service: Urology;  Laterality: Right;    vulva cancer       Family History   Problem Relation Age of Onset    Heart disease Mother     Hypertension Mother     Cancer Mother         lung    Heart disease Father     Heart disease Sister         Open heart surgery    No Known Problems Daughter      Social History     Tobacco Use    Smoking status: Former     Packs/day: 1.00     Years: 33.00     Pack years: 33.00     Types: Cigarettes     Quit date: 2000     Years since quittin.7    Smokeless tobacco: Former     Quit date:    Substance Use Topics    Alcohol use: No    Drug use: No       Review of patient's allergies indicates:   Allergen Reactions    Ciprofloxacin Other (See Comments)     Elevated liver enzymes      Pcn [penicillins] Anaphylaxis     TOLERATES ROCEPHIN WITHOUT DIFFICULTY       Outpatient meds:  Current Facility-Administered Medications on File Prior to Encounter   Medication Dose Route Frequency Provider Last Rate Last Admin    electrolyte-S (ISOLYTE)   Intravenous Continuous Raz Ruff MD        lactated ringers infusion   Intravenous Continuous Ruy Beltre MD         Current Outpatient Medications on File Prior to Encounter   Medication Sig Dispense Refill    cholecalciferol, vitamin D3, (VITAMIN D3) 25 mcg (1,000 unit) capsule Take 1 capsule (1,000  Units total) by mouth once daily. 10 capsule 0    diltiaZEM (CARDIZEM CD) 120 MG Cp24 Take 1 capsule (120 mg total) by mouth once daily. 90 capsule 3    ferric citrate (AURYXIA) 210 mg iron Tab Take 2 tablets by mouth 3 (three) times daily with meals.      gabapentin (NEURONTIN) 100 MG capsule Take 100 mg by mouth 2 (two) times daily. HCS      hydrALAZINE (APRESOLINE) 50 MG tablet Take 1 tablet (50 mg total) by mouth every 8 (eight) hours. 270 tablet 0    levothyroxine (SYNTHROID) 88 MCG tablet TAKE 1 TABLET BEFORE BREAKFAST (Patient taking differently: Take 88 mcg by mouth before breakfast. TAKE 1 TABLET BEFORE BREAKFAST) 90 tablet 3    LORazepam (ATIVAN) 0.5 MG tablet Take 1 tablet (0.5 mg total) by mouth every evening. 90 tablet 1    promethazine (PHENERGAN) 25 MG tablet TAKE 1 TABLET (25 MG TOTAL) BY MOUTH 2 (TWO) TIMES DAILY AS NEEDED FOR NAUSEA. 60 tablet 0    rOPINIRole (REQUIP) 0.5 MG tablet Take 0.5 mg by mouth every 8 (eight) hours.      sertraline (ZOLOFT) 50 MG tablet Take 1 tablet (50 mg total) by mouth every evening. Take 50 mg by mouth every evening. 90 tablet 1    traMADoL (ULTRAM) 50 mg tablet Take 1 tablet (50 mg total) by mouth every 6 (six) hours as needed. 30 tablet 0    zinc gluconate 50 mg tablet Take 50 mg by mouth once daily.      [DISCONTINUED] miconazole (MICOTIN) 2 % cream Apply topically 2 (two) times daily. 28 g 0       Scheduled meds:   chlorhexidine  15 mL Mouth/Throat BID    diltiaZEM  120 mg Oral Daily    gabapentin  100 mg Oral BID    levothyroxine  88 mcg Oral Before breakfast    LIDOcaine (PF) 10 mg/ml (1%)  1 mL Other Once    meropenem (MERREM) IVPB  500 mg Intravenous Q24H    mupirocin   Nasal BID    sertraline  50 mg Oral QHS       Infusions:   NORepinephrine bitartrate-D5W         PRN meds:  acetaminophen, calcium chloride IVPB, calcium chloride IVPB, calcium chloride IVPB, hydrALAZINE, HYDROmorphone, magnesium oxide, magnesium sulfate IVPB, magnesium sulfate IVPB, magnesium  sulfate IVPB, magnesium sulfate IVPB, ondansetron, oxyCODONE, potassium chloride, potassium chloride, potassium chloride, potassium chloride, sodium chloride 0.9%, sodium chloride 0.9%, sodium chloride 0.9%, sodium phosphate IVPB, sodium phosphate IVPB, sodium phosphate IVPB, sodium phosphate IVPB, sodium phosphate IVPB    Review of Systems:  Review of Systems   Constitutional:  Negative for chills, fever, malaise/fatigue and weight loss.   HENT:  Negative for hearing loss and nosebleeds.    Eyes:  Negative for blurred vision, double vision and photophobia.   Respiratory:  Negative for cough, shortness of breath and wheezing.    Cardiovascular:  Negative for chest pain, palpitations and leg swelling.   Gastrointestinal:  Negative for abdominal pain, constipation, diarrhea, heartburn, nausea and vomiting.   Genitourinary:  Negative for dysuria, frequency and urgency.   Musculoskeletal:  Negative for falls, joint pain and myalgias.   Skin:  Negative for itching and rash.   Neurological:  Negative for dizziness, speech change, focal weakness, loss of consciousness and headaches.   Endo/Heme/Allergies:  Does not bruise/bleed easily.   Psychiatric/Behavioral:  Negative for depression and substance abuse. The patient is not nervous/anxious.      OBJECTIVE:     Vital Signs and IO (Last 24H):  Temp:  [97.7 °F (36.5 °C)-98.7 °F (37.1 °C)]   Pulse:  [81-94]   Resp:  [10-30]   BP: ()/(48-64)   SpO2:  [89 %-100 %]   I/O last 3 completed shifts:  In: 3598.6 [I.V.:1155; IV Piggyback:2443.6]  Out: 500 [Drains:200; Blood:300]    Wt Readings from Last 5 Encounters:   09/08/22 70.9 kg (156 lb 3.2 oz)   08/30/22 69.4 kg (153 lb)   08/27/22 65.8 kg (145 lb)   08/22/22 67.6 kg (149 lb)   07/20/22 64.4 kg (142 lb)         Physical Exam:  Physical Exam  Constitutional:       General: She is not in acute distress.     Appearance: She is well-developed. She is not diaphoretic.   HENT:      Head: Normocephalic and atraumatic.       Mouth/Throat:      Mouth: Mucous membranes are moist.   Eyes:      General: No scleral icterus.     Pupils: Pupils are equal, round, and reactive to light.   Cardiovascular:      Rate and Rhythm: Normal rate and regular rhythm.   Pulmonary:      Effort: Pulmonary effort is normal. No respiratory distress.      Breath sounds: No stridor.   Abdominal:      General: There is no distension.      Palpations: Abdomen is soft.   Musculoskeletal:         General: No deformity. Normal range of motion.      Cervical back: Neck supple.   Skin:     General: Skin is warm and dry.      Findings: No erythema or rash.   Neurological:      Mental Status: She is alert and oriented to person, place, and time.      Cranial Nerves: No cranial nerve deficit.   Psychiatric:         Behavior: Behavior normal.       Body mass index is 24.46 kg/m².    Laboratory:  Recent Labs   Lab 09/07/22  1010 09/07/22  1803 09/08/22  0014 09/08/22  0605    139  --  139  139   K 2.3* 2.9* 3.1* 3.8  3.8    105  --  104  104   CO2 25 22*  --  23  23   BUN 20 20  --  27*  27*   CREATININE 5.5* 5.4*  --  6.4*  6.4*   GLU 90 77  --  66*  66*         Recent Labs   Lab 09/07/22  1010 09/07/22  1803 09/08/22  0605   CALCIUM 7.8* 7.6* 7.8*  7.8*   ALBUMIN 2.7* 2.1*  --    PHOS  --  5.4*  --    MG 1.3* 1.4*  --          Recent Labs   Lab 09/30/19  0820 11/15/19  0748   PTH, Intact 176.4 H 238.4 H         No results for input(s): POCTGLUCOSE in the last 168 hours.    Recent Labs   Lab 11/15/19  0748 01/22/20  0848 05/25/21  0847   Hemoglobin A1C Invalid A SEE COMMENT 4.6         Recent Labs   Lab 09/07/22  1010 09/07/22  1643 09/07/22  1803 09/08/22  0605   WBC 21.26*  --  17.39* 16.22*  16.22*   HGB 11.3*  --  9.5* 9.2*  9.2*   HCT 35.5* 25* 29.9* 28.7*  28.7*     --  140* 144*  144*   *  --  111* 113*  113*   MCHC 31.8*  --  31.8* 32.1  32.1   MONO 4.2  0.9  --  4.3  0.7 5.2  5.2  0.9  0.9         Recent Labs   Lab  09/07/22  1010 09/07/22  1803   BILITOT 0.8 0.9   PROT 6.0 4.5*   ALBUMIN 2.7* 2.1*   ALKPHOS 135 97   ALT 12 9*   AST 16 16         Recent Labs   Lab 04/26/22  1706 06/21/22  1306 06/22/22  1550   Color, UA Yellow Yellow Yellow   Appearance, UA Hazy A Cloudy A Cloudy A   pH, UA 7.0 7.0 7.0   Specific [place], UA 1.010 1.010 1.010   Protein, UA 1+ A 2+ A 2+ A   Glucose, UA Trace A Negative Negative   Ketones, UA Negative Negative Negative   Urobilinogen, UA Negative Negative Negative   Bilirubin (UA) Negative Negative Negative   Occult Blood UA 3+ A 2+ A 3+ A   Nitrite, UA Negative Negative Negative   RBC, UA >100 H 22 H >100 H   WBC, UA >100 H >100 H >100 H   Bacteria Negative Many A Negative   Hyaline Casts, UA 8 A 10 A 444 A         Recent Labs   Lab 12/08/20  2024 06/22/22  1526 09/07/22  1022 09/07/22  1643   POC PH 7.370 7.324 L  --  7.380   POC PCO2 39.0 53.8 H  --  41.4   POC HCO3 22.5 L 28.0  --  24.5   POC PO2 85 40  --  64 HH   POC SATURATED O2 96 69 L  --  92 L   POC BE -3 2  --  -1   Sample ARTERIAL VENOUS VENOUS VENOUS         Microbiology Results (last 7 days)       Procedure Component Value Units Date/Time    Blood culture [[id number]] Collected: 09/07/22 0945    Order Status: Completed Specimen: Blood from Peripheral, Forearm, Right Updated: 09/08/22 1232     Blood Culture, Routine No Growth to date      No Growth to date    Blood culture [[id number]] Collected: 09/07/22 1009    Order Status: Completed Specimen: Blood from Peripheral, Ankle, Right Updated: 09/08/22 1232     Blood Culture, Routine No Growth to date      No Growth to date          ASSESSMENT/PLAN:     ESRD on HD MWF via AVF  Continue current dialysis prescription.  Next HD per schedule.  Renal diet - low K, low phos.  No IVs or BP checks on access and/or non-dominant arm.    Anemia of CKD  Hgb and HCT are acceptable. Monitor.  Will provide ADAM and/or IV iron PRN.    MBD / Secondary HPT  Ca, phos, PTH and vitamin D levels are acceptable.    Phos binders, vitamin D analogues and calcimimetics as needed.    HTN  BP seem controlled.   Tolerate asymptomatic HTN up to -160.  Continue home meds.  Low sodium diet.    Ischemic colitis.  Plan per surgery.    Thank you for allowing us to participate in the care of your patient!   We will follow the patient and provide recommendations as needed.    Patient care time was spent personally by me on the following activities:   Obtaining a history.  Examination of patient.  Providing medical care at the patients bedside.  Developing a treatment plan with patient or surrogate and bedside caregivers.  Ordering and reviewing laboratory studies, radiographic studies, pulse oximetry.  Ordering and performing treatments and interventions.  Evaluation of patient's response to treatment.  Discussions with consultants while on the unit and immediately available to the patient.  Re-evaluation of the patient's condition.  Documentation in the medical record.     Elo Baker NP      [facility]  [address] [place] [address]  [place] [address]    [phone] - tel  [phone] - fax    9/8/2022

## 2022-09-08 NOTE — PLAN OF CARE
Problem: Adult Inpatient Plan of Care  Goal: Plan of Care Review  Outcome: Ongoing, Progressing     Problem: Infection  Goal: Absence of Infection Signs and Symptoms  Outcome: Ongoing, Progressing     Problem: Impaired Wound Healing  Goal: Optimal Wound Healing  Outcome: Ongoing, Progressing     Problem: Adjustment to Illness (Sepsis/Septic Shock)  Goal: Optimal Coping  Outcome: Ongoing, Progressing     Problem: Bleeding (Sepsis/Septic Shock)  Goal: Absence of Bleeding  Outcome: Ongoing, Progressing     Problem: Fall Injury Risk  Goal: Absence of Fall and Fall-Related Injury  Outcome: Ongoing, Progressing     Problem: Skin Injury Risk Increased  Goal: Skin Health and Integrity  Outcome: Ongoing, Progressing

## 2022-09-08 NOTE — PLAN OF CARE
09/07/22 2200   Patient Assessment/Suction   Level of Consciousness (AVPU) alert   Respiratory Effort Unlabored   Expansion/Accessory Muscles/Retractions expansion symmetric   All Lung Fields Breath Sounds clear   Rhythm/Pattern, Respiratory depth regular;pattern regular   Cough Frequency infrequent   Cough Type good;nonproductive   PRE-TX-O2   O2 Device (Oxygen Therapy) room air   SpO2 100 %   Pulse Oximetry Type Continuous   $ Pulse Oximetry - Multiple Charge Pulse Oximetry - Multiple   Pulse 89   Resp 20   BP (!) 110/52   Education   $ Education 15 min;DME Oxygen

## 2022-09-08 NOTE — PROGRESS NOTES
Having some trouble with pain control mainly with spasms   No ostomy function yet  Abdomen benign ostomy viable     Pod 1 colon resection    ok for cl  Valium for spasms-   Oral pain meds  Dvt proph

## 2022-09-08 NOTE — PHYSICIAN QUERY
PT Name: Shara Hutchins  MR #: [id number]     DOCUMENTATION CLARIFICATION     CDS/: Nathaly Bennett               Contact information: [phone]  This form is a permanent document in the medical record.     Query Date: September 8, 2022    By submitting this query, we are merely seeking further clarification of documentation.    Please utilize your independent clinical judgment when addressing the question(s) below.  The Medical Record contains the following:  Indicators Location in Medical Record   Risk factor; 73yo female presents w/ abd pain, N&V, rectal bleeding, had diarrhea.    Admitted to ICU w/ Ischemic colitis   ED      Admission orders   Clinical indicators;  CT Portal venous air with pneumatosis within the descending colon and sigmoid colon with diffuse wall thickening of the colon. Findings are suggestive of ischemic colitis.     History of ischemic colitis.  Now presenting with acute ischemia.  She has an acute abdomen and needs to go to the operating room emergently  Review of imaging; Obvious portal venous gas on the CT scan by my interpretation as well as thickened colon with signs of pneumatosis.    Dx ischemic colitis and sigmoid stricture    T-max 100.5  HR 97 to 83  RR 19 to 30  /70 to 92/53    WBC 21.26 > 17.39 > 16.22  Gran % 92.4 > 86.5 > 88.9  LA 2.0     Imaging on admit        General surgery consult 09/07              Op Rpt 09/07    VS flowsheet        Labs   Interventions;  Admit to ICU w/ acute abdomen     NS 1L IV bolus (09/07)  NS 125ml/hr continuous (09/07 to current)  Cefoxitin 2g IV x1 (ED)  LR 1.028L IV bolus x1 (ED)  Meropenem 500mg IV Q24 hrs (09/07 to current)     Admission orders    MAR        Dear doctor please document a diagnosis to correlate w/ clinical indicators and interventions.    [   ] Sepsis due to unknown organism     [   ] Sepsis due to (suspected) organism (please specify)     [   ] Severe Sepsis with Acute Organ Dysfunction/Failure (please specify organ  dysfunction/failure)     [   ] Sepsis with Septic Shock     [   ] SIRS without infectious etiology     [   ] SIRS with infection but without Sepsis     [ x  ] Other (please specify)  Not sepsis        Please document in your progress notes daily for the duration of treatment until resolved and include in your discharge summary.

## 2022-09-08 NOTE — PROGRESS NOTES
[facility] Medicine  Progress Note    Patient Name: Shara Hutchins  MRN: [id number]  Patient Class: IP- Inpatient   Admission Date: 9/7/2022  Length of Stay: 1 days  Attending Physician: Aleksander Phan MD  Primary Care Provider: April Horton MD        Subjective:     Principal Problem:Ischemic colitis        HPI:  72 year old female with a history of ESRD on HD MWF, HTN, hypothyroidism, and chronic diarrhea s/p iliostomy and reversal 2 year back came with abdominal pain .  Started on Friday and complaining of , nausea, vomiting.  Initially she was having only diarrhea but later today she saw blood clots along with stool and came to the hospital.  She is on dialysis and she got dialysis yesterday. Patient has had decreased p.o. intake for the last few days almost not eating at all.  CT of the abdomen  pelvis  was done and found Portal venous air with pneumatosis within the descending colon and sigmoid colon with diffuse wall thickening of the colon. Findings are suggestive of ischemic colitis.  Patient was given IV fluid and IV potassium supplement in the ER.  She is having abdominal pain but not in chest pain or shortness of breath and she is awake alert oriented.  On examination patient abdomen is soft but tender at lower abdomen .  ED physician discussed with surgeon and they will bring her to OR.  Of note there is no free air.      Overview/Hospital Course:  Patient is seen and examined  She complained of pain at the operative site  Colostomy in place and no stool  BP stable without vasopressor support.      Interval History:     Review of Systems  As per subjective   Objective:     Vital Signs (Most Recent):  Temp: 98 °F (36.7 °C) (09/08/22 1115)  Pulse: 85 (09/08/22 1230)  Resp: 16 (09/08/22 1322)  BP: (!) 98/48 (09/08/22 1230)  SpO2: 100 % (09/08/22 1230)   Vital Signs (24h Range):  Temp:  [97.7 °F (36.5 °C)-98.7 °F (37.1 °C)] 98 °F (36.7 °C)  Pulse:  [81-94] 85  Resp:  [10-30] 16  SpO2:   [89 %-100 %] 100 %  BP: ()/(48-64) 98/48     Weight: 70.9 kg (156 lb 3.2 oz)  Body mass index is 24.46 kg/m².    Intake/Output Summary (Last 24 hours) at 9/8/2022 1456  Last data filed at 9/8/2022 0641  Gross per 24 hour   Intake 1420.59 ml   Output 500 ml   Net 920.59 ml      Physical Exam  Vitals and nursing note reviewed.   HENT:      Head: Normocephalic and atraumatic.   Eyes:      Conjunctiva/sclera: Conjunctivae normal.   Neck:      Vascular: No JVD.   Cardiovascular:      Heart sounds: Normal heart sounds.   Pulmonary:      Effort: Pulmonary effort is normal.      Breath sounds: Normal breath sounds.   Abdominal:      Palpations: Abdomen is soft.      Tenderness: There is abdominal tenderness.   Skin:     General: Skin is warm.   Neurological:      Mental Status: She is alert and oriented to person, place, and time.       Significant Labs: All pertinent labs within the past 24 hours have been reviewed.  CBC:   Recent Labs   Lab 09/07/22  1010 09/07/22  1643 09/07/22  1803 09/08/22  0605   WBC 21.26*  --  17.39* 16.22*  16.22*   HGB 11.3*  --  9.5* 9.2*  9.2*   HCT 35.5* 25* 29.9* 28.7*  28.7*     --  140* 144*  144*     CMP:   Recent Labs   Lab 09/07/22  1010 09/07/22  1803 09/08/22  0014 09/08/22  0605    139  --  139  139   K 2.3* 2.9* 3.1* 3.8  3.8    105  --  104  104   CO2 25 22*  --  23  23   GLU 90 77  --  66*  66*   BUN 20 20  --  27*  27*   CREATININE 5.5* 5.4*  --  6.4*  6.4*   CALCIUM 7.8* 7.6*  --  7.8*  7.8*   PROT 6.0 4.5*  --   --    ALBUMIN 2.7* 2.1*  --   --    BILITOT 0.8 0.9  --   --    ALKPHOS 135 97  --   --    AST 16 16  --   --    ALT 12 9*  --   --    ANIONGAP 11 12  --  12  12     Cardiac Markers: No results for input(s): CKMB, MYOGLOBIN, BNP, TROPISTAT in the last 48 hours.    Significant Imaging: I have reviewed all pertinent imaging results/findings within the past 24 hours.      Assessment/Plan:     Active Hospital Problems    Diagnosis     *Ischemic colitis s/p laprotomy with ischemic sigmoid colon, sigmoid rectum stricture    ESRD on dialysis MWF    Severe dehydration due to chronic diarrhea/high output ileostomy and reversed    Emphysema lung    S/P ureteral stent placement    Essential hypertension    Status post reversal of ileostomy    Severe malnutrition    DARCI (acute kidney injury)    Hypothyroidism    Anemia of chronic disease    Anemia due to stage 5 chronic kidney disease treated with erythropoietin    Hisotry of Vulvar cancer     PLAN   descending colon and sigmoid colon was resected .   Post op care   Ostomy care   DC vasopressor   Continue IVF and antibiotics   Follow surgery   Feeding as per surgeon .  Increased pain killer dose   Follow cultures      VTE Risk Mitigation (From admission, onward)         Ordered     IP VTE HIGH RISK PATIENT  Once         09/07/22 1822     Place sequential compression device  Until discontinued         09/07/22 1822     Place JA hose  Until discontinued         09/07/22 1822     Place sequential compression device  Until discontinued         09/07/22 1258                Discharge Planning   RODRIGO:      Code Status: Full Code   Is the patient medically ready for discharge?:     Reason for patient still in hospital (select all that apply): Treatment  Discharge Plan A: Assisted Living                  Aleksander Phan MD  Department of Hospital Medicine   [facility]

## 2022-09-09 NOTE — PROGRESS NOTES
Nephrology Progress Note        Patient Name: Shara Hutchins  MRN: [id number]    Patient Class: IP- Inpatient   Admission Date: 9/7/2022  Length of Stay: 2 days  Date of Service: 9/9/2022    Attending Physician: Aleksander Phan MD  Primary Care Provider: April Horton MD    Reason for Consult: ESRD/ANEMIA/HTN/SHPT/ischemic colitis    SUBJECTIVE:     HPI: 72-year-old ESRD on HD MWF and history of multiple abdominal surgeries for ischemic colitis presents with bloody diarrhea and severe abdominal pain, a was diagnosed with ischemic colitis and is going for emergent surgery.    9/8  labs reviewed, K+ better.  No acute clearance needs today.  C/o post op pain, no other complaints.  Next HD tomorrow.  9/9 VSS. HD today. Started clears today.    Past Medical History:   Diagnosis Date    Cellulitis of trunk     Perineum, numerous episodes    Chronic diarrhea 02/10/2020    Short-gut syndrome    End stage renal disease on dialysis 01/07/2018    Fatigue     Hypertension     Hypothyroidism 02/10/2020    Iron deficiency anemia due to chronic blood loss 01/07/2018    Pulmonary hypertension 03/20/2020    Vulvar cancer 01/07/2019     Past Surgical History:   Procedure Laterality Date    ABDOMINAL SURGERY      ANGIOGRAPHY OF ARTERIOVENOUS SHUNT N/A 01/19/2022    Procedure: Fistulogram with Possible Intervention;  Surgeon: Raz Maher MD;  Location: [facility] CATH/EP LAB;  Service: Cardiothoracic;  Laterality: N/A;    APPENDECTOMY      AV FISTULA PLACEMENT Left 2018    BACK SURGERY      BLADDER FULGURATION N/A 06/16/2020    Procedure: FULGURATION, BLADDER;  Surgeon: Damari Barber Jr., MD;  Location: [facility];  Service: Urology;  Laterality: N/A;    carpel tunnel surgery once on each hand      CERVICAL FUSION      x 4    cervix repair      CHOLECYSTECTOMY  2000    COLECTOMY      CYSTOSCOPY W/ RETROGRADES  06/16/2020    Procedure: CYSTOSCOPY, WITH RETROGRADE PYELOGRAM;  Surgeon: Damari Barber Jr., MD;  Location: [facility];  Service:  Urology;;    CYSTOSCOPY W/ RETROGRADES Right 04/20/2021    Procedure: CYSTOSCOPY, WITH RETROGRADE PYELOGRAM;  Surgeon: Damari Barber Jr., MD;  Location: [facility] OR;  Service: Urology;  Laterality: Right;    CYSTOSCOPY W/ URETERAL STENT PLACEMENT Right 03/16/2021    Procedure: CYSTOSCOPY, WITH URETERAL STENT INSERTION;  Surgeon: Damari Barber Jr., MD;  Location: [facility] OR;  Service: Urology;  Laterality: Right;    CYSTOSCOPY W/ URETERAL STENT PLACEMENT Right 05/18/2021    Procedure: CYSTOSCOPY, WITH URETERAL STENT INSERTION;  Surgeon: Damari Barber Jr., MD;  Location: [facility] OR;  Service: Urology;  Laterality: Right;    CYSTOSCOPY W/ URETERAL STENT PLACEMENT Right 06/22/2022    Procedure: CYSTOSCOPY, WITH URETERAL STENT INSERTION;  Surgeon: Gaby Garcia MD;  Location: [facility] OR;  Service: Urology;  Laterality: Right;  cysto stent exchange    CYSTOSCOPY W/ URETERAL STENT REMOVAL Right 04/20/2021    Procedure: CYSTOSCOPY, WITH URETERAL STENT REMOVAL;  Surgeon: Damari Barber Jr., MD;  Location: [facility] OR;  Service: Urology;  Laterality: Right;    CYSTOSCOPY WITH URETEROSCOPY, RETROGRADE PYELOGRAPHY, AND INSERTION OF STENT Right 03/30/2021    Procedure: CYSTOSCOPY, WITH RETROGRADE PYELOGRAM AND URETERAL STENT INSERTION;  Surgeon: Damari Barber Jr., MD;  Location: [facility] OR;  Service: Urology;  Laterality: Right;    CYSTOURETEROSCOPY WITH RETROGRADE PYELOGRAPHY AND INSERTION OF STENT INTO URETER Right 01/18/2022    Procedure: CYSTOURETEROSCOPY, WITH RETROGRADE PYELOGRAM AND URETERAL STENT INSERTION;  Surgeon: Damari Barber Jr., MD;  Location: [facility] OR;  Service: Urology;  Laterality: Right;    CYSTOURETEROSCOPY WITH RETROGRADE PYELOGRAPHY AND INSERTION OF STENT INTO URETER Right 8/30/2022    Procedure: CYSTOURETEROSCOPY, WITH RETROGRADE PYELOGRAM AND URETERAL STENT INSERTION;  Surgeon: Damari Barber Jr., MD;  Location: [facility] OR;  Service: Urology;  Laterality: Right;    DIAGNOSTIC LAPAROSCOPY N/A 02/13/2020     Procedure: LAPAROSCOPY, DIAGNOSTIC;  Surgeon: Robbi Lovell III, MD;  Location: [facility] OR;  Service: General;  Laterality: N/A;    HYSTERECTOMY  1980    ILEOSTOMY N/A 02/13/2020    Procedure: CREATION, ILEOSTOMY Loop;  Surgeon: Robbi Lovell III, MD;  Location: [facility] OR;  Service: General;  Laterality: N/A;    ILEOSTOMY CLOSURE N/A 05/18/2020    Procedure: CLOSURE, ILEOSTOMY;  Surgeon: Robbi Lovell III, MD;  Location: [facility] OR;  Service: General;  Laterality: N/A;    LASER LITHOTRIPSY Right 03/30/2021    Procedure: LITHOTRIPSY, USING LASER;  Surgeon: Damari Barber Jr., MD;  Location: [facility] OR;  Service: Urology;  Laterality: Right;    LUMBAR LAMINECTOMY      LYSIS OF ADHESIONS N/A 02/13/2020    Procedure: LYSIS, ADHESIONS;  Surgeon: Robbi Lovell III, MD;  Location: [facility] OR;  Service: General;  Laterality: N/A;    NECK SURGERY      OSTEOTOMY  9/7/2022    Procedure: OSTEOTOMY;  Surgeon: Brigette Chavez MD;  Location: [facility] OR;  Service: General;;    PERCUTANEOUS TRANSLUMINAL ANGIOPLASTY OF ARTERIOVENOUS FISTULA N/A 01/19/2022    Procedure: PTA, AV FISTULA;  Surgeon: Raz Maher MD;  Location: [facility] CATH/EP LAB;  Service: Cardiothoracic;  Laterality: N/A;    PHLEBOGRAPHY  02/28/2020    Procedure: VENOGRAM;  Surgeon: Robbi Lovell III, MD;  Location: [facility] OR;  Service: General;;    REMOVAL OF VASCULAR ACCESS PORT Right 02/13/2020    Procedure: REMOVAL, VASCULAR ACCESS PORT;  Surgeon: Robbi Lovell III, MD;  Location: [facility] OR;  Service: General;  Laterality: Right;    RETROGRADE PYELOGRAPHY  03/16/2021    Procedure: PYELOGRAM, RETROGRADE;  Surgeon: Damari Barber Jr., MD;  Location: [facility] OR;  Service: Urology;;    RETROGRADE PYELOGRAPHY Right 05/18/2021    Procedure: PYELOGRAM, RETROGRADE;  Surgeon: Damari Barber Jr., MD;  Location: [facility] OR;  Service: Urology;  Laterality: Right;    RETROGRADE PYELOGRAPHY  06/22/2022    Procedure: PYELOGRAM, RETROGRADE;  Surgeon: Gaby ALVES  MD Jose;  Location: [facility] OR;  Service: Urology;;    SHOULDER SURGERY      TONSILLECTOMY      URETEROSCOPIC REMOVAL OF URETERIC CALCULUS Right 2021    Procedure: REMOVAL, CALCULUS, URETER, URETEROSCOPIC;  Surgeon: Damari Barber Jr., MD;  Location: [facility] OR;  Service: Urology;  Laterality: Right;    URETEROSCOPIC REMOVAL OF URETERIC CALCULUS Right 2021    Procedure: REMOVAL, CALCULUS, URETER, URETEROSCOPIC;  Surgeon: Damari Barber Jr., MD;  Location: [facility] OR;  Service: Urology;  Laterality: Right;    URETEROSCOPIC REMOVAL OF URETERIC CALCULUS Right 2022    Procedure: REMOVAL, CALCULUS, URETER, URETEROSCOPIC;  Surgeon: Damari Barber Jr., MD;  Location: [facility] OR;  Service: Urology;  Laterality: Right;    vulva cancer       Family History   Problem Relation Age of Onset    Heart disease Mother     Hypertension Mother     Cancer Mother         lung    Heart disease Father     Heart disease Sister         Open heart surgery    No Known Problems Daughter      Social History     Tobacco Use    Smoking status: Former     Packs/day: 1.00     Years: 33.00     Pack years: 33.00     Types: Cigarettes     Quit date: 2000     Years since quittin.7    Smokeless tobacco: Former     Quit date:    Substance Use Topics    Alcohol use: No    Drug use: No       Review of patient's allergies indicates:   Allergen Reactions    Ciprofloxacin Other (See Comments)     Elevated liver enzymes      Pcn [penicillins] Anaphylaxis     TOLERATES ROCEPHIN WITHOUT DIFFICULTY       Outpatient meds:  Current Facility-Administered Medications on File Prior to Encounter   Medication Dose Route Frequency Provider Last Rate Last Admin    electrolyte-S (ISOLYTE)   Intravenous Continuous Raz Ruff MD        lactated ringers infusion   Intravenous Continuous Ruy Beltre MD         Current Outpatient Medications on File Prior to Encounter   Medication Sig Dispense Refill    cholecalciferol, vitamin D3, (VITAMIN  D3) 25 mcg (1,000 unit) capsule Take 1 capsule (1,000 Units total) by mouth once daily. 10 capsule 0    diltiaZEM (CARDIZEM CD) 120 MG Cp24 Take 1 capsule (120 mg total) by mouth once daily. 90 capsule 3    ferric citrate (AURYXIA) 210 mg iron Tab Take 2 tablets by mouth 3 (three) times daily with meals.      gabapentin (NEURONTIN) 100 MG capsule Take 100 mg by mouth 2 (two) times daily. HCS      hydrALAZINE (APRESOLINE) 50 MG tablet Take 1 tablet (50 mg total) by mouth every 8 (eight) hours. 270 tablet 0    levothyroxine (SYNTHROID) 88 MCG tablet TAKE 1 TABLET BEFORE BREAKFAST (Patient taking differently: Take 88 mcg by mouth before breakfast. TAKE 1 TABLET BEFORE BREAKFAST) 90 tablet 3    LORazepam (ATIVAN) 0.5 MG tablet Take 1 tablet (0.5 mg total) by mouth every evening. 90 tablet 1    promethazine (PHENERGAN) 25 MG tablet TAKE 1 TABLET (25 MG TOTAL) BY MOUTH 2 (TWO) TIMES DAILY AS NEEDED FOR NAUSEA. 60 tablet 0    rOPINIRole (REQUIP) 0.5 MG tablet Take 0.5 mg by mouth every 8 (eight) hours.      sertraline (ZOLOFT) 50 MG tablet Take 1 tablet (50 mg total) by mouth every evening. Take 50 mg by mouth every evening. 90 tablet 1    traMADoL (ULTRAM) 50 mg tablet Take 1 tablet (50 mg total) by mouth every 6 (six) hours as needed. 30 tablet 0    zinc gluconate 50 mg tablet Take 50 mg by mouth once daily.      [DISCONTINUED] miconazole (MICOTIN) 2 % cream Apply topically 2 (two) times daily. 28 g 0       Scheduled meds:   chlorhexidine  15 mL Mouth/Throat BID    diltiaZEM  120 mg Oral Daily    gabapentin  100 mg Oral BID    levothyroxine  88 mcg Oral Before breakfast    LIDOcaine (PF) 10 mg/ml (1%)  1 mL Other Once    meropenem (MERREM) IVPB  500 mg Intravenous Q24H    mupirocin   Nasal BID    sertraline  50 mg Oral QHS       Infusions:        PRN meds:  acetaminophen, calcium chloride IVPB, calcium chloride IVPB, calcium chloride IVPB, diazePAM, hydrALAZINE, HYDROmorphone, magnesium oxide, magnesium sulfate IVPB,  magnesium sulfate IVPB, magnesium sulfate IVPB, magnesium sulfate IVPB, ondansetron, oxyCODONE, oxyCODONE, potassium chloride, potassium chloride, potassium chloride, potassium chloride, sodium chloride 0.9%, sodium chloride 0.9%, sodium chloride 0.9%, sodium phosphate IVPB, sodium phosphate IVPB, sodium phosphate IVPB, sodium phosphate IVPB, sodium phosphate IVPB    Review of Systems:    OBJECTIVE:     Vital Signs and IO (Last 24H):  Temp:  [97.3 °F (36.3 °C)-100 °F (37.8 °C)]   Pulse:  [74-93]   Resp:  [8-32]   BP: ()/(41-63)   SpO2:  [95 %-100 %]   I/O last 3 completed shifts:  In: 1185 [P.O.:830; I.V.:155; IV Piggyback:200]  Out: 270 [Drains:260; Stool:10]    Wt Readings from Last 5 Encounters:   09/08/22 70.9 kg (156 lb 3.2 oz)   08/30/22 69.4 kg (153 lb)   08/27/22 65.8 kg (145 lb)   08/22/22 67.6 kg (149 lb)   07/20/22 64.4 kg (142 lb)     Physical Exam:  Physical Exam  Constitutional:       General: She is not in acute distress.     Appearance: She is well-developed. She is not diaphoretic.   HENT:      Head: Normocephalic and atraumatic.      Mouth/Throat:      Mouth: Mucous membranes are moist.   Eyes:      General: No scleral icterus.     Pupils: Pupils are equal, round, and reactive to light.   Cardiovascular:      Rate and Rhythm: Normal rate and regular rhythm.   Pulmonary:      Effort: Pulmonary effort is normal. No respiratory distress.      Breath sounds: No stridor.   Abdominal:      General: There is no distension.      Palpations: Abdomen is soft.   Musculoskeletal:         General: No deformity. Normal range of motion.      Cervical back: Neck supple.   Skin:     General: Skin is warm and dry.      Findings: No erythema or rash.   Neurological:      Mental Status: She is alert and oriented to person, place, and time.      Cranial Nerves: No cranial nerve deficit.   Psychiatric:         Behavior: Behavior normal.       Body mass index is 24.46 kg/m².    Laboratory:  Recent Labs   Lab  09/07/22  1803 09/08/22  0014 09/08/22  0605 09/09/22  0354     --  139  139 137   K 2.9* 3.1* 3.8  3.8 4.0     --  104  104 104   CO2 22*  --  23  23 18*   BUN 20  --  27*  27* 38*   CREATININE 5.4*  --  6.4*  6.4* 7.0*   GLU 77  --  66*  66* 52*         Recent Labs   Lab 09/07/22  1010 09/07/22  1803 09/08/22  0605 09/09/22  0354   CALCIUM 7.8* 7.6* 7.8*  7.8* 7.4*   ALBUMIN 2.7* 2.1*  --   --    PHOS  --  5.4*  --   --    MG 1.3* 1.4*  --  2.2  2.2         Recent Labs   Lab 09/30/19  0820 11/15/19  0748   PTH, Intact 176.4 H 238.4 H         No results for input(s): POCTGLUCOSE in the last 168 hours.    Recent Labs   Lab 11/15/19  0748 01/22/20  0848 05/25/21  0847   Hemoglobin A1C Invalid A SEE COMMENT 4.6         Recent Labs   Lab 09/07/22  1803 09/08/22  0605 09/09/22  0354   WBC 17.39* 16.22*  16.22* 14.83*   HGB 9.5* 9.2*  9.2* 8.0*   HCT 29.9* 28.7*  28.7* 25.1*   * 144*  144* 168   * 113*  113* 111*   MCHC 31.8* 32.1  32.1 31.9*   MONO 4.3  0.7 5.2  5.2  0.9  0.9 5.8  0.9         Recent Labs   Lab 09/07/22  1010 09/07/22  1803   BILITOT 0.8 0.9   PROT 6.0 4.5*   ALBUMIN 2.7* 2.1*   ALKPHOS 135 97   ALT 12 9*   AST 16 16         Recent Labs   Lab 04/26/22  1706 06/21/22  1306 06/22/22  1550   Color, UA Yellow Yellow Yellow   Appearance, UA Hazy A Cloudy A Cloudy A   pH, UA 7.0 7.0 7.0   Specific [place], UA 1.010 1.010 1.010   Protein, UA 1+ A 2+ A 2+ A   Glucose, UA Trace A Negative Negative   Ketones, UA Negative Negative Negative   Urobilinogen, UA Negative Negative Negative   Bilirubin (UA) Negative Negative Negative   Occult Blood UA 3+ A 2+ A 3+ A   Nitrite, UA Negative Negative Negative   RBC, UA >100 H 22 H >100 H   WBC, UA >100 H >100 H >100 H   Bacteria Negative Many A Negative   Hyaline Casts, UA 8 A 10 A 444 A         Recent Labs   Lab 12/08/20 2024 06/22/22  1526 09/07/22  1022 09/07/22  1643   POC PH 7.370 7.324 L  --  7.380   POC PCO2 39.0 53.8 H   --  41.4   POC HCO3 22.5 L 28.0  --  24.5   POC PO2 85 40  --  64 HH   POC SATURATED O2 96 69 L  --  92 L   POC BE -3 2  --  -1   Sample ARTERIAL VENOUS VENOUS VENOUS         Microbiology Results (last 7 days)       Procedure Component Value Units Date/Time    Blood culture [[id number]] Collected: 09/07/22 0945    Order Status: Completed Specimen: Blood from Peripheral, Forearm, Right Updated: 09/09/22 1232     Blood Culture, Routine No Growth to date      No Growth to date      No Growth to date    Blood culture [[id number]] Collected: 09/07/22 1009    Order Status: Completed Specimen: Blood from Peripheral, Ankle, Right Updated: 09/09/22 1232     Blood Culture, Routine No Growth to date      No Growth to date      No Growth to date          ASSESSMENT/PLAN:     ESRD on HD MWF via AVF  Continue current dialysis prescription.  Next HD per schedule.  Renal diet - low K, low phos.  No IVs or BP checks on access and/or non-dominant arm.    Anemia of CKD  Hgb and HCT are acceptable. Monitor.  Will provide ADAM and/or IV iron PRN.    MBD / Secondary HPT  Ca, phos, PTH and vitamin D levels are acceptable.   Phos binders, vitamin D analogues and calcimimetics as needed.    HTN  BP seem controlled.   Tolerate asymptomatic HTN up to -160.  Continue home meds.  Low sodium diet.    Ischemic colitis.  S/p surgery on 9/8 by Dr. Chavez.    Thank you for allowing us to participate in the care of your patient!   We will follow the patient and provide recommendations as needed.    Patient care time was spent personally by me on the following activities:   Obtaining a history.  Examination of patient.  Providing medical care at the patients bedside.  Developing a treatment plan with patient or surrogate and bedside caregivers.  Ordering and reviewing laboratory studies, radiographic studies, pulse oximetry.  Ordering and performing treatments and interventions.  Evaluation of patient's response to treatment.  Discussions with  consultants while on the unit and immediately available to the patient.  Re-evaluation of the patient's condition.  Documentation in the medical record.     Jorge Webb MD      [facility]  [address] [place] [id number] [phone] - tel  [phone] - fax    9/9/2022

## 2022-09-09 NOTE — NURSING
72 yr old female  awake but sleepy  said she had a pain pill  Start kit and info packet given and explained   Pouch removed and area cleaned      Instructed her how to measure and cut the pouch place the pouch and closed the pouch.   She had an ileostomy a few years back but said that she forgot it all  but she still told us how to close the pouch.   RX to the chart to sign extra supplies to the room

## 2022-09-09 NOTE — PROGRESS NOTES
Doing well  Vitals labs reviewed   Abdomen benign ostomy viable no output yet       Ap  Full liquid diet  Ambulate is   Await gi function return

## 2022-09-09 NOTE — PROGRESS NOTES
"Critical access hospital  Adult Nutrition   Progress Note (Initial Assessment)     SUMMARY     Recommendations  Recommendation/Intervention:   1) Advance diet as medically appropriate and tolerated, with renal diet restrictions in place.   2) Encourage adequate intake as tolerated.   3) RD to monitor for diet progression + tolerance, labs, and plan of care.    Goals: Pt to consume/tolerate > 75% PO intake to meet estimated energy needs.  Nutrition Goal Status: new    Dietitian Rounds Brief  Pt is a 71 y/o F admitted for ischemic colitis s/p laprotomy with ischemic sigmoid colon, sigmoid rectum stricture. Pt presented to ED w/ abdominal pain, N/V, vomiting, and rectal bleeding. MWF dialysis pt. Hx of severe malnutrition - weight has been stable since diagnosis. Currently on CLD, tolerating well and diet to be advance to full liquid for lunch today. RD to continue to follow.   LBM: 9/7/22    Diet order:   Current Diet Order: CLD     Evaluation of Received Nutrient/Fluid Intake  Energy Calories Required: not meeting needs  Protein Required: not meeting needs  Fluid Required: not meeting needs  Tolerance: tolerating     % Intake of Estimated Energy Needs: 0 - 25 %  % Meal Intake: 0 - 25 %      Intake/Output Summary (Last 24 hours) at 9/9/2022 1056  Last data filed at 9/9/2022 0600  Gross per 24 hour   Intake 830 ml   Output 70 ml   Net 760 ml        Anthropometrics  Temp: 98.9 °F (37.2 °C)  Height Method: Stated  Height: 5' 7" (170.2 cm)  Height (inches): 67 in  Weight Method: Bed Scale  Weight: 70.9 kg (156 lb 3.2 oz)  Weight (lb): 156.2 lb  Ideal Body Weight (IBW), Female: 135 lb  % Ideal Body Weight, Female (lb): 111.84 %  BMI (Calculated): 24.5  BMI Grade: 18.5-24.9 - normal       Estimated/Assessed Needs  Weight Used For Calorie Calculations: 70.9 kg (156 lb 4.9 oz)  Energy Calorie Requirements (kcal): 9142-7475 (25-30)  Energy Need Method: Kcal/kg  Protein Requirements:  (1.2-1.5 gm/kg)  Weight Used For " Protein Calculations: 70.9 kg (156 lb 4.9 oz)  Fluid Requirements (mL): 24 hour UOP + 1000 mL     RDA Method (mL): 1773       Reason for Assessment  Reason For Assessment: other (see comments) (ICU admit)  Relevant Medical History: ESRD, HTN, hypothyroidism, anemia, vitamin D deficiency, vulvar cancer, severe malnutrition    Nutrition/Diet History  Factors Affecting Nutritional Intake: clear liquid diet    Nutrition Risk Screen  Nutrition Risk Screen: no indicators present     MST Score: 0  Have you recently lost weight without trying?: No  Weight loss score: 0  Have you been eating poorly because of a decreased appetite?: No  Appetite score: 0       Weight History:  Wt Readings from Last 5 Encounters:   09/08/22 70.9 kg (156 lb 3.2 oz)   08/30/22 69.4 kg (153 lb)   08/27/22 65.8 kg (145 lb)   08/22/22 67.6 kg (149 lb)   07/20/22 64.4 kg (142 lb)        Medications:Pertinent Medications Reviewed  Scheduled Meds:   chlorhexidine  15 mL Mouth/Throat BID    diltiaZEM  120 mg Oral Daily    gabapentin  100 mg Oral BID    levothyroxine  88 mcg Oral Before breakfast    LIDOcaine (PF) 10 mg/ml (1%)  1 mL Other Once    meropenem (MERREM) IVPB  500 mg Intravenous Q24H    mupirocin   Nasal BID    sertraline  50 mg Oral QHS     Labs: Pertinent Labs Reviewed  Clinical Chemistry:  Recent Labs   Lab 09/07/22  1803 09/08/22  0014 09/09/22  0354      < > 137   K 2.9*   < > 4.0      < > 104   CO2 22*   < > 18*   GLU 77   < > 52*   BUN 20   < > 38*   CREATININE 5.4*   < > 7.0*   CALCIUM 7.6*   < > 7.4*   PROT 4.5*  --   --    ALBUMIN 2.1*  --   --    BILITOT 0.9  --   --    ALKPHOS 97  --   --    AST 16  --   --    ALT 9*  --   --    ANIONGAP 12   < > 15   MG 1.4*  --  2.2  2.2   PHOS 5.4*  --   --     < > = values in this interval not displayed.     CBC:   Recent Labs   Lab 09/09/22  0354   WBC 14.83*   RBC 2.26*   HGB 8.0*   HCT 25.1*      *   MCH 35.4*   MCHC 31.9*     Cardiac Profile:  Recent Labs   Lab  09/07/22  1010   TROPONINI 0.033     Monitor and Evaluation  Food and Nutrient Intake: energy intake, food and beverage intake  Food and Nutrient Adminstration: diet order  Knowledge/Beliefs/Attitudes: food and nutrition knowledge/skill  Physical Activity and Function: nutrition-related ADLs and IADLs  Anthropometric Measurements: weight, weight change, body mass index  Biochemical Data, Medical Tests and Procedures: electrolyte and renal panel, gastrointestinal profile, glucose/endocrine profile  Nutrition-Focused Physical Findings: overall appearance     Nutrition Risk  Level of Risk/Frequency of Follow-up: high     Nutrition Follow-Up  RD Follow-up?: Yes      Alesia Rangel RD 09/09/2022 10:56 AM

## 2022-09-09 NOTE — SUBJECTIVE & OBJECTIVE
Interval History:     Review of Systems  As per subjective   Objective:     Vital Signs (Most Recent):  Temp: 98.3 °F (36.8 °C) (09/09/22 1115)  Pulse: 78 (09/09/22 1301)  Resp: (!) 22 (09/09/22 1511)  BP: (!) 103/52 (09/09/22 1301)  SpO2: (!) 92 % (09/09/22 1301)   Vital Signs (24h Range):  Temp:  [97.3 °F (36.3 °C)-100 °F (37.8 °C)] 98.3 °F (36.8 °C)  Pulse:  [74-86] 78  Resp:  [8-28] 22  SpO2:  [90 %-100 %] 92 %  BP: ()/(41-62) 103/52     Weight: 70.9 kg (156 lb 3.2 oz)  Body mass index is 24.46 kg/m².    Intake/Output Summary (Last 24 hours) at 9/9/2022 1733  Last data filed at 9/9/2022 0600  Gross per 24 hour   Intake 830 ml   Output 70 ml   Net 760 ml      Physical Exam  Vitals and nursing note reviewed.   HENT:      Head: Normocephalic and atraumatic.   Eyes:      Conjunctiva/sclera: Conjunctivae normal.   Neck:      Vascular: No JVD.   Cardiovascular:      Heart sounds: Normal heart sounds.   Pulmonary:      Effort: Pulmonary effort is normal.      Breath sounds: Normal breath sounds.   Abdominal:      Palpations: Abdomen is soft.   Musculoskeletal:         General: Normal range of motion.   Skin:     General: Skin is warm.   Neurological:      Mental Status: She is alert and oriented to person, place, and time.       Significant Labs: All pertinent labs within the past 24 hours have been reviewed.  BMP:   Recent Labs   Lab 09/09/22  0354   GLU 52*      K 4.0      CO2 18*   BUN 38*   CREATININE 7.0*   CALCIUM 7.4*   MG 2.2  2.2     CBC:   Recent Labs   Lab 09/07/22  1803 09/08/22  0605 09/09/22  0354   WBC 17.39* 16.22*  16.22* 14.83*   HGB 9.5* 9.2*  9.2* 8.0*   HCT 29.9* 28.7*  28.7* 25.1*   * 144*  144* 168     CMP:   Recent Labs   Lab 09/07/22  1803 09/08/22  0014 09/08/22  0605 09/09/22  0354     --  139  139 137   K 2.9* 3.1* 3.8  3.8 4.0     --  104  104 104   CO2 22*  --  23  23 18*   GLU 77  --  66*  66* 52*   BUN 20  --  27*  27* 38*   CREATININE 5.4*   --  6.4*  6.4* 7.0*   CALCIUM 7.6*  --  7.8*  7.8* 7.4*   PROT 4.5*  --   --   --    ALBUMIN 2.1*  --   --   --    BILITOT 0.9  --   --   --    ALKPHOS 97  --   --   --    AST 16  --   --   --    ALT 9*  --   --   --    ANIONGAP 12  --  12  12 15     I have reviewed all pertinent imaging results/findings within the past 24 hours.  Significant Imaging: I have reviewed all pertinent imaging results/findings within the past 24 hours.

## 2022-09-09 NOTE — PLAN OF CARE
09/09/22 0730   Patient Assessment/Suction   Level of Consciousness (AVPU) alert   Respiratory Effort Unlabored;Normal   PRE-TX-O2   O2 Device (Oxygen Therapy) nasal cannula   Flow (L/min) 2   SpO2 96 %   Pulse Oximetry Type Continuous   $ Pulse Oximetry - Multiple Charge Pulse Oximetry - Multiple   Pulse 74   Resp 10   BP (!) 91/46

## 2022-09-09 NOTE — PROGRESS NOTES
[facility]  Progress Note    Patient Name: Shara Hutchins  MRN: [id number]  Patient Class: IP- Inpatient   Admission Date: 9/7/2022  Length of Stay: 2 days  Attending Physician: Aleksander Phan MD  Primary Care Provider: April Horton MD        Subjective:     Principal Problem:Ischemic colitis        HPI:  72 year old female with a history of ESRD on HD MWF, HTN, hypothyroidism, and chronic diarrhea s/p iliostomy and reversal 2 year back came with abdominal pain .  Started on Friday and complaining of , nausea, vomiting.  Initially she was having only diarrhea but later today she saw blood clots along with stool and came to the hospital.  She is on dialysis and she got dialysis yesterday. Patient has had decreased p.o. intake for the last few days almost not eating at all.  CT of the abdomen  pelvis  was done and found Portal venous air with pneumatosis within the descending colon and sigmoid colon with diffuse wall thickening of the colon. Findings are suggestive of ischemic colitis.  Patient was given IV fluid and IV potassium supplement in the ER.  She is having abdominal pain but not in chest pain or shortness of breath and she is awake alert oriented.  On examination patient abdomen is soft but tender at lower abdomen .  ED physician discussed with surgeon and they will bring her to OR.  Of note there is no free air.      Overview/Hospital Course:  Patient is seen and examined  She complained of pain at the operative site  Colostomy in place and no stool  BP stable without vasopressor support.    9/9  Still having pains. Colostomy some blood , no stool   BP stable . WBC better. May soon need PRBC        Interval History:     Review of Systems  As per subjective   Objective:     Vital Signs (Most Recent):  Temp: 98.3 °F (36.8 °C) (09/09/22 1115)  Pulse: 78 (09/09/22 1301)  Resp: (!) 22 (09/09/22 1511)  BP: (!) 103/52 (09/09/22 1301)  SpO2: (!) 92 % (09/09/22 1301)   Vital Signs (24h  Range):  Temp:  [97.3 °F (36.3 °C)-100 °F (37.8 °C)] 98.3 °F (36.8 °C)  Pulse:  [74-86] 78  Resp:  [8-28] 22  SpO2:  [90 %-100 %] 92 %  BP: ()/(41-62) 103/52     Weight: 70.9 kg (156 lb 3.2 oz)  Body mass index is 24.46 kg/m².    Intake/Output Summary (Last 24 hours) at 9/9/2022 1733  Last data filed at 9/9/2022 0600  Gross per 24 hour   Intake 830 ml   Output 70 ml   Net 760 ml      Physical Exam  Vitals and nursing note reviewed.   HENT:      Head: Normocephalic and atraumatic.   Eyes:      Conjunctiva/sclera: Conjunctivae normal.   Neck:      Vascular: No JVD.   Cardiovascular:      Heart sounds: Normal heart sounds.   Pulmonary:      Effort: Pulmonary effort is normal.      Breath sounds: Normal breath sounds.   Abdominal:      Palpations: Abdomen is soft.   Musculoskeletal:         General: Normal range of motion.   Skin:     General: Skin is warm.   Neurological:      Mental Status: She is alert and oriented to person, place, and time.       Significant Labs: All pertinent labs within the past 24 hours have been reviewed.  BMP:   Recent Labs   Lab 09/09/22  0354   GLU 52*      K 4.0      CO2 18*   BUN 38*   CREATININE 7.0*   CALCIUM 7.4*   MG 2.2  2.2     CBC:   Recent Labs   Lab 09/07/22 1803 09/08/22  0605 09/09/22  0354   WBC 17.39* 16.22*  16.22* 14.83*   HGB 9.5* 9.2*  9.2* 8.0*   HCT 29.9* 28.7*  28.7* 25.1*   * 144*  144* 168     CMP:   Recent Labs   Lab 09/07/22 1803 09/08/22  0014 09/08/22  0605 09/09/22  0354     --  139  139 137   K 2.9* 3.1* 3.8  3.8 4.0     --  104  104 104   CO2 22*  --  23  23 18*   GLU 77  --  66*  66* 52*   BUN 20  --  27*  27* 38*   CREATININE 5.4*  --  6.4*  6.4* 7.0*   CALCIUM 7.6*  --  7.8*  7.8* 7.4*   PROT 4.5*  --   --   --    ALBUMIN 2.1*  --   --   --    BILITOT 0.9  --   --   --    ALKPHOS 97  --   --   --    AST 16  --   --   --    ALT 9*  --   --   --    ANIONGAP 12  --  12  12 15     I have reviewed all  pertinent imaging results/findings within the past 24 hours.  Significant Imaging: I have reviewed all pertinent imaging results/findings within the past 24 hours.      Assessment/Plan:      Active Hospital Problems    Diagnosis    *Ischemic colitis s/p laprotomy with ischemic sigmoid colon, sigmoid rectum stricture    ESRD on dialysis MWF    Severe dehydration due to chronic diarrhea/high output ileostomy and reversed    Emphysema lung    S/P ureteral stent placement    Essential hypertension    Status post reversal of ileostomy    Severe malnutrition    DARCI (acute kidney injury)    Hypothyroidism    Anemia of chronic disease    Anemia due to stage 5 chronic kidney disease treated with erythropoietin    Hisotry of Vulvar cancer        PLAN   descending colon and sigmoid colon was resected .   Post op care   Ostomy care    Continue IVF and antibiotics   Follow surgery   Feeding as per surgeon .  Increased pain killer dose   Follow cultures  Downgrade tomorrow   May need tube feeding    IV thyroxine   IV BP medication PRN     VTE Risk Mitigation (From admission, onward)         Ordered     IP VTE HIGH RISK PATIENT  Once         09/07/22 1822     Place sequential compression device  Until discontinued         09/07/22 1822     Place JA hose  Until discontinued         09/07/22 1822     Place sequential compression device  Until discontinued         09/07/22 1258                Discharge Planning   RODRIGO: 9/11/2022     Code Status: Full Code   Is the patient medically ready for discharge?:     Reason for patient still in hospital (select all that apply): Treatment  Discharge Plan A: Assisted Living                  Aleksander Phan MD  Department of Hospital Medicine   [facility]

## 2022-09-10 NOTE — SUBJECTIVE & OBJECTIVE
Interval History:     Review of Systems  As per subjective   Objective:     Vital Signs (Most Recent):  Temp: 96.9 °F (36.1 °C) (09/10/22 1415)  Pulse: 84 (09/10/22 1415)  Resp: (!) 29 (09/10/22 1415)  BP: (!) 91/42 (09/10/22 1415)  SpO2: (!) 85 % (09/10/22 1415)   Vital Signs (24h Range):  Temp:  [96.7 °F (35.9 °C)-98.9 °F (37.2 °C)] 96.9 °F (36.1 °C)  Pulse:  [75-85] 84  Resp:  [7-29] 29  SpO2:  [84 %-100 %] 85 %  BP: ()/(42-54) 91/42     Weight: 70.9 kg (156 lb 3.2 oz)  Body mass index is 24.46 kg/m².    Intake/Output Summary (Last 24 hours) at 9/10/2022 1422  Last data filed at 9/10/2022 1245  Gross per 24 hour   Intake 1596.1 ml   Output 65 ml   Net 1531.1 ml      Physical Exam  Vitals and nursing note reviewed.   HENT:      Head: Normocephalic and atraumatic.   Eyes:      Conjunctiva/sclera: Conjunctivae normal.   Neck:      Vascular: No JVD.   Cardiovascular:      Heart sounds: Normal heart sounds.   Pulmonary:      Effort: Pulmonary effort is normal.      Breath sounds: Normal breath sounds.   Abdominal:      General: Bowel sounds are normal.      Palpations: Abdomen is soft.   Skin:     General: Skin is warm.   Neurological:      Mental Status: She is alert and oriented to person, place, and time.   Psychiatric:         Mood and Affect: Mood normal.       Significant Labs: All pertinent labs within the past 24 hours have been reviewed.  CBC:   Recent Labs   Lab 09/09/22  0354 09/10/22  0404   WBC 14.83* 12.42   HGB 8.0* 7.4*   HCT 25.1* 24.0*    143*     CMP:   Recent Labs   Lab 09/09/22  0354 09/10/22  0404    136   K 4.0 3.4*    107   CO2 18* 19*   GLU 52* 124*   BUN 38* 42*   CREATININE 7.0* 7.2*   CALCIUM 7.4* 6.2*   ALBUMIN  --  1.7*   ANIONGAP 15 10     Cardiac Markers: No results for input(s): CKMB, MYOGLOBIN, BNP, TROPISTAT in the last 48 hours.    Significant Imaging: I have reviewed all pertinent imaging results/findings within the past 24 hours.

## 2022-09-10 NOTE — PROGRESS NOTES
Nephrology Progress Note        Patient Name: Shara Hutchins  MRN: [id number]    Patient Class: IP- Inpatient   Admission Date: 9/7/2022  Length of Stay: 3 days  Date of Service: 9/10/2022    Attending Physician: Aleksander Phan MD  Primary Care Provider: April Horton MD    Reason for Consult: ESRD/ANEMIA/HTN/SHPT/ischemic colitis    SUBJECTIVE:     HPI: 72-year-old ESRD on HD MWF and history of multiple abdominal surgeries for ischemic colitis presents with bloody diarrhea and severe abdominal pain, a was diagnosed with ischemic colitis and is going for emergent surgery.    9/8  labs reviewed, K+ better.  No acute clearance needs today.  C/o post op pain, no other complaints.  Next HD tomorrow.  9/9 VSS. HD today. Started clears today.  9/10 VSS. HD deferred yesterday. Seen on HD today. Will add epoetin as well, getting 1 PRBC too.    Past Medical History:   Diagnosis Date    Cellulitis of trunk     Perineum, numerous episodes    Chronic diarrhea 02/10/2020    Short-gut syndrome    End stage renal disease on dialysis 01/07/2018    Fatigue     Hypertension     Hypothyroidism 02/10/2020    Iron deficiency anemia due to chronic blood loss 01/07/2018    Pulmonary hypertension 03/20/2020    Vulvar cancer 01/07/2019     Past Surgical History:   Procedure Laterality Date    ABDOMINAL SURGERY      ANGIOGRAPHY OF ARTERIOVENOUS SHUNT N/A 01/19/2022    Procedure: Fistulogram with Possible Intervention;  Surgeon: Raz Maher MD;  Location: [facility] CATH/EP LAB;  Service: Cardiothoracic;  Laterality: N/A;    APPENDECTOMY      AV FISTULA PLACEMENT Left 2018    BACK SURGERY      BLADDER FULGURATION N/A 06/16/2020    Procedure: FULGURATION, BLADDER;  Surgeon: Damari Barber Jr., MD;  Location: [facility] OR;  Service: Urology;  Laterality: N/A;    carpel tunnel surgery once on each hand      CERVICAL FUSION      x 4    cervix repair      CHOLECYSTECTOMY  2000    COLECTOMY      CYSTOSCOPY W/ RETROGRADES  06/16/2020    Procedure: CYSTOSCOPY,  WITH RETROGRADE PYELOGRAM;  Surgeon: Damari Barber Jr., MD;  Location: [facility] OR;  Service: Urology;;    CYSTOSCOPY W/ RETROGRADES Right 04/20/2021    Procedure: CYSTOSCOPY, WITH RETROGRADE PYELOGRAM;  Surgeon: Damari Barber Jr., MD;  Location: [facility] OR;  Service: Urology;  Laterality: Right;    CYSTOSCOPY W/ URETERAL STENT PLACEMENT Right 03/16/2021    Procedure: CYSTOSCOPY, WITH URETERAL STENT INSERTION;  Surgeon: Damari Barber Jr., MD;  Location: [facility] OR;  Service: Urology;  Laterality: Right;    CYSTOSCOPY W/ URETERAL STENT PLACEMENT Right 05/18/2021    Procedure: CYSTOSCOPY, WITH URETERAL STENT INSERTION;  Surgeon: Damari Barber Jr., MD;  Location: [facility] OR;  Service: Urology;  Laterality: Right;    CYSTOSCOPY W/ URETERAL STENT PLACEMENT Right 06/22/2022    Procedure: CYSTOSCOPY, WITH URETERAL STENT INSERTION;  Surgeon: Gaby Garcia MD;  Location: [facility] OR;  Service: Urology;  Laterality: Right;  cysto stent exchange    CYSTOSCOPY W/ URETERAL STENT REMOVAL Right 04/20/2021    Procedure: CYSTOSCOPY, WITH URETERAL STENT REMOVAL;  Surgeon: Damari Barber Jr., MD;  Location: [facility] OR;  Service: Urology;  Laterality: Right;    CYSTOSCOPY WITH URETEROSCOPY, RETROGRADE PYELOGRAPHY, AND INSERTION OF STENT Right 03/30/2021    Procedure: CYSTOSCOPY, WITH RETROGRADE PYELOGRAM AND URETERAL STENT INSERTION;  Surgeon: Damari Barber Jr., MD;  Location: [facility] OR;  Service: Urology;  Laterality: Right;    CYSTOURETEROSCOPY WITH RETROGRADE PYELOGRAPHY AND INSERTION OF STENT INTO URETER Right 01/18/2022    Procedure: CYSTOURETEROSCOPY, WITH RETROGRADE PYELOGRAM AND URETERAL STENT INSERTION;  Surgeon: Damari Barber Jr., MD;  Location: [facility] OR;  Service: Urology;  Laterality: Right;    CYSTOURETEROSCOPY WITH RETROGRADE PYELOGRAPHY AND INSERTION OF STENT INTO URETER Right 8/30/2022    Procedure: CYSTOURETEROSCOPY, WITH RETROGRADE PYELOGRAM AND URETERAL STENT INSERTION;  Surgeon: Damari Barber Jr., MD;  Location: [facility] OR;   Service: Urology;  Laterality: Right;    DIAGNOSTIC LAPAROSCOPY N/A 02/13/2020    Procedure: LAPAROSCOPY, DIAGNOSTIC;  Surgeon: Robbi Lovell III, MD;  Location: [facility] OR;  Service: General;  Laterality: N/A;    HYSTERECTOMY  1980    ILEOSTOMY N/A 02/13/2020    Procedure: CREATION, ILEOSTOMY Loop;  Surgeon: Robbi Lovell III, MD;  Location: [facility] OR;  Service: General;  Laterality: N/A;    ILEOSTOMY CLOSURE N/A 05/18/2020    Procedure: CLOSURE, ILEOSTOMY;  Surgeon: Robbi Lovell III, MD;  Location: [facility] OR;  Service: General;  Laterality: N/A;    LASER LITHOTRIPSY Right 03/30/2021    Procedure: LITHOTRIPSY, USING LASER;  Surgeon: Damari Barber Jr., MD;  Location: [facility] OR;  Service: Urology;  Laterality: Right;    LUMBAR LAMINECTOMY      LYSIS OF ADHESIONS N/A 02/13/2020    Procedure: LYSIS, ADHESIONS;  Surgeon: Robbi Lovell III, MD;  Location: [facility] OR;  Service: General;  Laterality: N/A;    NECK SURGERY      OSTEOTOMY  9/7/2022    Procedure: OSTEOTOMY;  Surgeon: Brigette Chavez MD;  Location: [facility] OR;  Service: General;;    PERCUTANEOUS TRANSLUMINAL ANGIOPLASTY OF ARTERIOVENOUS FISTULA N/A 01/19/2022    Procedure: PTA, AV FISTULA;  Surgeon: Raz Maher MD;  Location: [facility] CATH/EP LAB;  Service: Cardiothoracic;  Laterality: N/A;    PHLEBOGRAPHY  02/28/2020    Procedure: VENOGRAM;  Surgeon: Robbi Lovell III, MD;  Location: [facility] OR;  Service: General;;    REMOVAL OF VASCULAR ACCESS PORT Right 02/13/2020    Procedure: REMOVAL, VASCULAR ACCESS PORT;  Surgeon: Robbi Lovell III, MD;  Location: [facility] OR;  Service: General;  Laterality: Right;    RETROGRADE PYELOGRAPHY  03/16/2021    Procedure: PYELOGRAM, RETROGRADE;  Surgeon: Damari Barber Jr., MD;  Location: [facility] OR;  Service: Urology;;    RETROGRADE PYELOGRAPHY Right 05/18/2021    Procedure: PYELOGRAM, RETROGRADE;  Surgeon: Damari Barber Jr., MD;  Location: [facility] OR;  Service: Urology;  Laterality: Right;    RETROGRADE PYELOGRAPHY   2022    Procedure: PYELOGRAM, RETROGRADE;  Surgeon: Gaby Garcia MD;  Location: [facility] OR;  Service: Urology;;    SHOULDER SURGERY      TONSILLECTOMY      URETEROSCOPIC REMOVAL OF URETERIC CALCULUS Right 2021    Procedure: REMOVAL, CALCULUS, URETER, URETEROSCOPIC;  Surgeon: Damari Barber Jr., MD;  Location: [facility] OR;  Service: Urology;  Laterality: Right;    URETEROSCOPIC REMOVAL OF URETERIC CALCULUS Right 2021    Procedure: REMOVAL, CALCULUS, URETER, URETEROSCOPIC;  Surgeon: Damari Barber Jr., MD;  Location: [facility] OR;  Service: Urology;  Laterality: Right;    URETEROSCOPIC REMOVAL OF URETERIC CALCULUS Right 2022    Procedure: REMOVAL, CALCULUS, URETER, URETEROSCOPIC;  Surgeon: Damari Barber Jr., MD;  Location: [facility] OR;  Service: Urology;  Laterality: Right;    vulva cancer       Family History   Problem Relation Age of Onset    Heart disease Mother     Hypertension Mother     Cancer Mother         lung    Heart disease Father     Heart disease Sister         Open heart surgery    No Known Problems Daughter      Social History     Tobacco Use    Smoking status: Former     Packs/day: 1.00     Years: 33.00     Pack years: 33.00     Types: Cigarettes     Quit date: 2000     Years since quittin.7    Smokeless tobacco: Former     Quit date:    Substance Use Topics    Alcohol use: No    Drug use: No       Review of patient's allergies indicates:   Allergen Reactions    Ciprofloxacin Other (See Comments)     Elevated liver enzymes      Pcn [penicillins] Anaphylaxis     TOLERATES ROCEPHIN WITHOUT DIFFICULTY       Outpatient meds:  Current Facility-Administered Medications on File Prior to Encounter   Medication Dose Route Frequency Provider Last Rate Last Admin    electrolyte-S (ISOLYTE)   Intravenous Continuous Raz Ruff MD        lactated ringers infusion   Intravenous Continuous Ruy Beltre MD         Current Outpatient Medications on File Prior to Encounter    Medication Sig Dispense Refill    cholecalciferol, vitamin D3, (VITAMIN D3) 25 mcg (1,000 unit) capsule Take 1 capsule (1,000 Units total) by mouth once daily. 10 capsule 0    diltiaZEM (CARDIZEM CD) 120 MG Cp24 Take 1 capsule (120 mg total) by mouth once daily. 90 capsule 3    ferric citrate (AURYXIA) 210 mg iron Tab Take 2 tablets by mouth 3 (three) times daily with meals.      gabapentin (NEURONTIN) 100 MG capsule Take 100 mg by mouth 2 (two) times daily. HCS      hydrALAZINE (APRESOLINE) 50 MG tablet Take 1 tablet (50 mg total) by mouth every 8 (eight) hours. 270 tablet 0    levothyroxine (SYNTHROID) 88 MCG tablet TAKE 1 TABLET BEFORE BREAKFAST (Patient taking differently: Take 88 mcg by mouth before breakfast. TAKE 1 TABLET BEFORE BREAKFAST) 90 tablet 3    LORazepam (ATIVAN) 0.5 MG tablet Take 1 tablet (0.5 mg total) by mouth every evening. 90 tablet 1    promethazine (PHENERGAN) 25 MG tablet TAKE 1 TABLET (25 MG TOTAL) BY MOUTH 2 (TWO) TIMES DAILY AS NEEDED FOR NAUSEA. 60 tablet 0    rOPINIRole (REQUIP) 0.5 MG tablet Take 0.5 mg by mouth every 8 (eight) hours.      sertraline (ZOLOFT) 50 MG tablet Take 1 tablet (50 mg total) by mouth every evening. Take 50 mg by mouth every evening. 90 tablet 1    traMADoL (ULTRAM) 50 mg tablet Take 1 tablet (50 mg total) by mouth every 6 (six) hours as needed. 30 tablet 0    zinc gluconate 50 mg tablet Take 50 mg by mouth once daily.      [DISCONTINUED] miconazole (MICOTIN) 2 % cream Apply topically 2 (two) times daily. 28 g 0       Scheduled meds:   chlorhexidine  15 mL Mouth/Throat BID    diltiaZEM  120 mg Oral Daily    [START ON 9/12/2022] epoetin nica-epbx  10,000 Units Subcutaneous Every Mon, Wed, Fri    epoetin nica-epbx  10,000 Units Subcutaneous Once    gabapentin  100 mg Oral BID    levothyroxine  25 mcg Intravenous Daily    LIDOcaine (PF) 10 mg/ml (1%)  1 mL Other Once    meropenem (MERREM) IVPB  500 mg Intravenous Q24H    mupirocin   Nasal BID    sertraline  50  mg Oral QHS       Infusions:        PRN meds:  sodium chloride, acetaminophen, calcium chloride IVPB, calcium chloride IVPB, calcium chloride IVPB, diazePAM, hydrALAZINE, HYDROmorphone, magnesium oxide, magnesium sulfate IVPB, magnesium sulfate IVPB, magnesium sulfate IVPB, magnesium sulfate IVPB, ondansetron, oxyCODONE, oxyCODONE, potassium chloride, potassium chloride, potassium chloride, potassium chloride, sodium chloride 0.9%, sodium chloride 0.9%, sodium chloride 0.9%, sodium phosphate IVPB, sodium phosphate IVPB, sodium phosphate IVPB, sodium phosphate IVPB, sodium phosphate IVPB    Review of Systems:    OBJECTIVE:     Vital Signs and IO (Last 24H):  Temp:  [98.3 °F (36.8 °C)-98.9 °F (37.2 °C)]   Pulse:  [75-86]   Resp:  [7-24]   BP: ()/(44-53)   SpO2:  [90 %-100 %]   I/O last 3 completed shifts:  In: 1460.1 [P.O.:1410; IV Piggyback:50.1]  Out: 85 [Drains:80; Stool:5]    Wt Readings from Last 5 Encounters:   09/08/22 70.9 kg (156 lb 3.2 oz)   08/30/22 69.4 kg (153 lb)   08/27/22 65.8 kg (145 lb)   08/22/22 67.6 kg (149 lb)   07/20/22 64.4 kg (142 lb)     Physical Exam:  Physical Exam  Constitutional:       General: She is not in acute distress.     Appearance: She is well-developed. She is not diaphoretic.   HENT:      Head: Normocephalic and atraumatic.      Mouth/Throat:      Mouth: Mucous membranes are moist.   Eyes:      General: No scleral icterus.     Pupils: Pupils are equal, round, and reactive to light.   Cardiovascular:      Rate and Rhythm: Normal rate and regular rhythm.   Pulmonary:      Effort: Pulmonary effort is normal. No respiratory distress.      Breath sounds: No stridor.   Abdominal:      General: There is no distension.      Palpations: Abdomen is soft.   Musculoskeletal:         General: No deformity. Normal range of motion.      Cervical back: Neck supple.   Skin:     General: Skin is warm and dry.      Findings: No erythema or rash.   Neurological:      Mental Status: She is  alert and oriented to person, place, and time.      Cranial Nerves: No cranial nerve deficit.   Psychiatric:         Behavior: Behavior normal.       Body mass index is 24.46 kg/m².    Laboratory:  Recent Labs   Lab 09/08/22  0605 09/09/22  0354 09/10/22  0404     139 137 136   K 3.8  3.8 4.0 3.4*     104 104 107   CO2 23  23 18* 19*   BUN 27*  27* 38* 42*   CREATININE 6.4*  6.4* 7.0* 7.2*   GLU 66*  66* 52* 124*         Recent Labs   Lab 09/07/22  1010 09/07/22  1803 09/08/22  0605 09/09/22  0354 09/10/22  0404   CALCIUM 7.8* 7.6* 7.8*  7.8* 7.4* 6.2*   ALBUMIN 2.7* 2.1*  --   --  1.7*   PHOS  --  5.4*  --   --   --    MG 1.3* 1.4*  --  2.2  2.2 1.9         Recent Labs   Lab 09/30/19  0820 11/15/19  0748   PTH, Intact 176.4 H 238.4 H         No results for input(s): POCTGLUCOSE in the last 168 hours.    Recent Labs   Lab 11/15/19  0748 01/22/20  0848 05/25/21  0847   Hemoglobin A1C Invalid A SEE COMMENT 4.6         Recent Labs   Lab 09/08/22  0605 09/09/22  0354 09/10/22  0404   WBC 16.22*  16.22* 14.83* 12.42   HGB 9.2*  9.2* 8.0* 7.4*   HCT 28.7*  28.7* 25.1* 24.0*   *  144* 168 143*   *  113* 111* 113*   MCHC 32.1  32.1 31.9* 30.8*   MONO 5.2  5.2  0.9  0.9 5.8  0.9 5.1  0.6         Recent Labs   Lab 09/07/22  1010 09/07/22  1803 09/10/22  0404   BILITOT 0.8 0.9  --    PROT 6.0 4.5*  --    ALBUMIN 2.7* 2.1* 1.7*   ALKPHOS 135 97  --    ALT 12 9*  --    AST 16 16  --          Recent Labs   Lab 04/26/22  1706 06/21/22  1306 06/22/22  1550   Color, UA Yellow Yellow Yellow   Appearance, UA Hazy A Cloudy A Cloudy A   pH, UA 7.0 7.0 7.0   Specific [place], UA 1.010 1.010 1.010   Protein, UA 1+ A 2+ A 2+ A   Glucose, UA Trace A Negative Negative   Ketones, UA Negative Negative Negative   Urobilinogen, UA Negative Negative Negative   Bilirubin (UA) Negative Negative Negative   Occult Blood UA 3+ A 2+ A 3+ A   Nitrite, UA Negative Negative Negative   RBC, UA >100 H 22 H >100  H   WBC, UA >100 H >100 H >100 H   Bacteria Negative Many A Negative   Hyaline Casts, UA 8 A 10 A 444 A         Recent Labs   Lab 12/08/20 2024 06/22/22  1526 09/07/22  1022 09/07/22  1643   POC PH 7.370 7.324 L  --  7.380   POC PCO2 39.0 53.8 H  --  41.4   POC HCO3 22.5 L 28.0  --  24.5   POC PO2 85 40  --  64 HH   POC SATURATED O2 96 69 L  --  92 L   POC BE -3 2  --  -1   Sample ARTERIAL VENOUS VENOUS VENOUS         Microbiology Results (last 7 days)       Procedure Component Value Units Date/Time    Blood culture [[id number]] Collected: 09/07/22 0945    Order Status: Completed Specimen: Blood from Peripheral, Forearm, Right Updated: 09/09/22 1232     Blood Culture, Routine No Growth to date      No Growth to date      No Growth to date    Blood culture [[id number]] Collected: 09/07/22 1009    Order Status: Completed Specimen: Blood from Peripheral, Ankle, Right Updated: 09/09/22 1232     Blood Culture, Routine No Growth to date      No Growth to date      No Growth to date          ASSESSMENT/PLAN:     ESRD on HD MWF via AVF  Continue current dialysis prescription.  Next HD per schedule.  Renal diet - low K, low phos.  No IVs or BP checks on access and/or non-dominant arm.    Anemia of CKD  Hgb and HCT are acceptable. Monitor.  Will add ADAM, got 1 PRBC on 9/10.    MBD / Secondary HPT  Ca, phos, PTH and vitamin D levels are acceptable.   Phos binders, vitamin D analogues and calcimimetics as needed.    HTN  BP seem controlled.   Tolerate asymptomatic HTN up to -160.  Continue home meds.  Low sodium diet.    Ischemic colitis.  S/p surgery on 9/8 by Dr. Chavez.    Thank you for allowing us to participate in the care of your patient!   We will follow the patient and provide recommendations as needed.    Patient care time was spent personally by me on the following activities:   Obtaining a history.  Examination of patient.  Providing medical care at the patients bedside.  Developing a treatment plan with  patient or surrogate and bedside caregivers.  Ordering and reviewing laboratory studies, radiographic studies, pulse oximetry.  Ordering and performing treatments and interventions.  Evaluation of patient's response to treatment.  Discussions with consultants while on the unit and immediately available to the patient.  Re-evaluation of the patient's condition.  Documentation in the medical record.     Jorge Webb MD      [facility]  [address] [place] [id number]    [phone] - tel  [phone] - fax    9/10/2022

## 2022-09-10 NOTE — CARE UPDATE
09/10/22 0826   Patient Assessment/Suction   Level of Consciousness (AVPU) alert   Respiratory Effort Normal;Unlabored   Expansion/Accessory Muscles/Retractions no use of accessory muscles;no retractions;expansion symmetric   All Lung Fields Breath Sounds Anterior:;clear;diminished   Rhythm/Pattern, Respiratory unlabored;pattern regular;depth regular   Cough Frequency infrequent   Cough Type no productive sputum   PRE-TX-O2   O2 Device (Oxygen Therapy) room air   SpO2 (!) 94 %   Pulse Oximetry Type Continuous   $ Pulse Oximetry - Single Charge Pulse Oximetry - Single   Education   $ Education 15 min   Respiratory Evaluation   $ Care Plan Tech Time 15 min   $ Eval/Re-eval Charges Evaluation   Evaluation For   (care plan)

## 2022-09-10 NOTE — PROGRESS NOTES
[facility] Medicine  Progress Note    Patient Name: Shara Hutchins  MRN: [id number]  Patient Class: IP- Inpatient   Admission Date: 9/7/2022  Length of Stay: 3 days  Attending Physician: Aleksander Phan MD  Primary Care Provider: April Horton MD        Subjective:     Principal Problem:Ischemic colitis        HPI:  72 year old female with a history of ESRD on HD MWF, HTN, hypothyroidism, and chronic diarrhea s/p iliostomy and reversal 2 year back came with abdominal pain .  Started on Friday and complaining of , nausea, vomiting.  Initially she was having only diarrhea but later today she saw blood clots along with stool and came to the hospital.  She is on dialysis and she got dialysis yesterday. Patient has had decreased p.o. intake for the last few days almost not eating at all.  CT of the abdomen  pelvis  was done and found Portal venous air with pneumatosis within the descending colon and sigmoid colon with diffuse wall thickening of the colon. Findings are suggestive of ischemic colitis.  Patient was given IV fluid and IV potassium supplement in the ER.  She is having abdominal pain but not in chest pain or shortness of breath and she is awake alert oriented.  On examination patient abdomen is soft but tender at lower abdomen .  ED physician discussed with surgeon and they will bring her to OR.  Of note there is no free air.      Overview/Hospital Course:  Patient is seen and examined  She complained of pain at the operative site  Colostomy in place and no stool  BP stable without vasopressor support.    9/9  Still having pains. Colostomy some blood , no stool   BP stable . WBC better. May soon need PRBC    9/10  Sitting up   BP OK . Will do HD . Colostomy with some drainage appear liquid stool        Interval History:     Review of Systems  As per subjective   Objective:     Vital Signs (Most Recent):  Temp: 96.9 °F (36.1 °C) (09/10/22 1415)  Pulse: 84 (09/10/22 1415)  Resp: (!) 29  (09/10/22 1415)  BP: (!) 91/42 (09/10/22 1415)  SpO2: (!) 85 % (09/10/22 1415)   Vital Signs (24h Range):  Temp:  [96.7 °F (35.9 °C)-98.9 °F (37.2 °C)] 96.9 °F (36.1 °C)  Pulse:  [75-85] 84  Resp:  [7-29] 29  SpO2:  [84 %-100 %] 85 %  BP: ()/(42-54) 91/42     Weight: 70.9 kg (156 lb 3.2 oz)  Body mass index is 24.46 kg/m².    Intake/Output Summary (Last 24 hours) at 9/10/2022 1422  Last data filed at 9/10/2022 1245  Gross per 24 hour   Intake 1596.1 ml   Output 65 ml   Net 1531.1 ml      Physical Exam  Vitals and nursing note reviewed.   HENT:      Head: Normocephalic and atraumatic.   Eyes:      Conjunctiva/sclera: Conjunctivae normal.   Neck:      Vascular: No JVD.   Cardiovascular:      Heart sounds: Normal heart sounds.   Pulmonary:      Effort: Pulmonary effort is normal.      Breath sounds: Normal breath sounds.   Abdominal:      General: Bowel sounds are normal.      Palpations: Abdomen is soft.   Skin:     General: Skin is warm.   Neurological:      Mental Status: She is alert and oriented to person, place, and time.   Psychiatric:         Mood and Affect: Mood normal.       Significant Labs: All pertinent labs within the past 24 hours have been reviewed.  CBC:   Recent Labs   Lab 09/09/22  0354 09/10/22  0404   WBC 14.83* 12.42   HGB 8.0* 7.4*   HCT 25.1* 24.0*    143*     CMP:   Recent Labs   Lab 09/09/22  0354 09/10/22  0404    136   K 4.0 3.4*    107   CO2 18* 19*   GLU 52* 124*   BUN 38* 42*   CREATININE 7.0* 7.2*   CALCIUM 7.4* 6.2*   ALBUMIN  --  1.7*   ANIONGAP 15 10     Cardiac Markers: No results for input(s): CKMB, MYOGLOBIN, BNP, TROPISTAT in the last 48 hours.    Significant Imaging: I have reviewed all pertinent imaging results/findings within the past 24 hours.      Assessment/Plan:      Active Hospital Problems    Diagnosis    *Ischemic colitis s/p laprotomy with ischemic sigmoid colon, sigmoid rectum stricture    ESRD on dialysis MWF    Severe dehydration due to  chronic diarrhea/high output ileostomy and reversed    Emphysema lung    S/P ureteral stent placement    Essential hypertension    Status post reversal of ileostomy    Severe malnutrition    DARCI (acute kidney injury)    Hypothyroidism    Anemia of chronic disease    Anemia due to stage 5 chronic kidney disease treated with erythropoietin    Hisotry of Vulvar cancer          PLAN   descending colon and sigmoid colon was resected .   Post op care   Ostomy care    Continue IVF and antibiotics   Follow surgery   Feeding as per surgeon .  Increased pain killer dose   Follow cultures  Downgrade   May need tube feeding    IV thyroxine   IV BP medication PRN   1 PRBC order to give with HD     VTE Risk Mitigation (From admission, onward)         Ordered     IP VTE HIGH RISK PATIENT  Once         09/07/22 1822     Place sequential compression device  Until discontinued         09/07/22 1822     Place JA hose  Until discontinued         09/07/22 1822     Place sequential compression device  Until discontinued         09/07/22 1258                Discharge Planning   RODRIGO: 9/11/2022     Code Status: Full Code   Is the patient medically ready for discharge?:     Reason for patient still in hospital (select all that apply): Treatment  Discharge Plan A: Assisted Living                  Aleksander Phan MD  Department of Hospital Medicine   [facility]

## 2022-09-10 NOTE — NURSING TRANSFER
Nursing Transfer Note      9/10/2022     Reason patient is being transferred: Downgrade from ICU     Transfer To: 1100    Transfer via stretcher    Transfer with cardiac monitoring    Transported by 2 ICU RN's    Medicines sent: IV abx    Chart send with patient: Yes    Notified: daughter    Patient reassessed at: 9/10 1645    Upon arrival to floor: cardiac monitor applied, patient oriented to room, call bell in reach, and bed in lowest position

## 2022-09-10 NOTE — CARE UPDATE
09/09/22 2030   Patient Assessment/Suction   Level of Consciousness (AVPU) alert   Respiratory Effort Unlabored   Expansion/Accessory Muscles/Retractions no use of accessory muscles   All Lung Fields Breath Sounds clear;diminished   Rhythm/Pattern, Respiratory no shortness of breath reported   Cough Frequency no cough   PRE-TX-O2   O2 Device (Oxygen Therapy) nasal cannula   $ Is the patient on Low Flow Oxygen? Yes   Flow (L/min) 2   SpO2 95 %   Pulse Oximetry Type Continuous   $ Pulse Oximetry - Multiple Charge Pulse Oximetry - Multiple   Pulse 84   Resp 14   Positioning   Head of Bed (HOB) Positioning HOB elevated;HOB at 30 degrees   Respiratory Evaluation   $ Care Plan Tech Time 15 min

## 2022-09-10 NOTE — PROGRESS NOTES
HD tx tolerated well  Net UF 1 liter  1 unit PRBC administered on HD  Pt educated re fluid intake       09/10/22 1500   Handoff Report   Received From Kaylee Lu   Given To Daniela   Vital Signs   Temp 97 °F (36.1 °C)   Temp src Axillary   Pulse 85   Resp (!) 34   SpO2 97 %   BP (!) 103/57   MAP (mmHg) 73   Assessments (Pre/Post)   Blood Liters Processed (BLP) 51   Transport Modality not applicable   Level of Consciousness (AVPU) alert   Dialyzer Clearance moderately streaked   Post-Hemodialysis Assessment   Rinseback Volume (mL) 250 mL   Blood Volume Processed (Liters) 51 L   Dialyzer Clearance Moderately streaked   Duration of Treatment 180 minutes   Additional Fluid Intake (mL) 500 mL   Total UF (mL) 1800 mL   Net Fluid Removal 1000   Patient Response to Treatment tolerated well   Post-Treatment Weight 73.9 kg (162 lb 14.7 oz)   Treatment Weight Change -1   Arterial bleeding stop time (min) 7 min   Venous bleeding stop time (min) 5 min   Post-Hemodialysis Comments Tx complete, all blood returned per protocol, no issues

## 2022-09-10 NOTE — PROGRESS NOTES
Pt hypotensive while sleeping throughout the night. Notified Dr. Hammond about any BP correction. Instructed to continue to monitor. Pt asymptomatic as well as BP and MAP increase to WNL once awake.

## 2022-09-10 NOTE — NURSING
Patient ambulated to chair today with minimal assistance. Abd incision is CDI, moderate serous drainage noted around J.P. insertion site. Site reinforced with gauze and silk tape. Silver dressing removed by Dr Preciado and replaced with gauze and tape. Staples intact, no redness, drainage, or irritation noted. HD session today with 1 L pf fluid removed. Patient tolerated well. Patient currenty sitting up in bed, no acute distress noted, awake, alert, and oriented x 3, calm, respirations even and unlabored, and skin is warm and dry. Patient verbalized understanding of status and plan of care. Patient denies needs at this time. Side rails up x 2, call light in reach, bed low and locked. Will continue to monitor.

## 2022-09-11 NOTE — PROGRESS NOTES
Nephrology Progress Note        Patient Name: Shara Hutchins  MRN: [id number]    Patient Class: IP- Inpatient   Admission Date: 9/7/2022  Length of Stay: 4 days  Date of Service: 9/11/2022    Attending Physician: Aleksander Phan MD  Primary Care Provider: April Horton MD    Reason for Consult: ESRD/ANEMIA/HTN/SHPT/ischemic colitis    SUBJECTIVE:     HPI: 72-year-old ESRD on HD MWF and history of multiple abdominal surgeries for ischemic colitis presents with bloody diarrhea and severe abdominal pain, a was diagnosed with ischemic colitis and is going for emergent surgery.    9/8  labs reviewed, K+ better.  No acute clearance needs today.  C/o post op pain, no other complaints.  Next HD tomorrow.  9/9 VSS. HD today. Started clears today.  9/10 VSS. HD deferred yesterday. Seen on HD today. Will add epoetin as well, getting 1 PRBC too.  9/11 VSS, no new complains. HD on Monday.    Past Medical History:   Diagnosis Date    Cellulitis of trunk     Perineum, numerous episodes    Chronic diarrhea 02/10/2020    Short-gut syndrome    End stage renal disease on dialysis 01/07/2018    Fatigue     Hypertension     Hypothyroidism 02/10/2020    Iron deficiency anemia due to chronic blood loss 01/07/2018    Pulmonary hypertension 03/20/2020    Vulvar cancer 01/07/2019     Past Surgical History:   Procedure Laterality Date    ABDOMINAL SURGERY      ANGIOGRAPHY OF ARTERIOVENOUS SHUNT N/A 01/19/2022    Procedure: Fistulogram with Possible Intervention;  Surgeon: Raz Maher MD;  Location: [facility] CATH/EP LAB;  Service: Cardiothoracic;  Laterality: N/A;    APPENDECTOMY      AV FISTULA PLACEMENT Left 2018    BACK SURGERY      BLADDER FULGURATION N/A 06/16/2020    Procedure: FULGURATION, BLADDER;  Surgeon: Damari Barber Jr., MD;  Location: [facility] OR;  Service: Urology;  Laterality: N/A;    carpel tunnel surgery once on each hand      CERVICAL FUSION      x 4    cervix repair      CHOLECYSTECTOMY  2000    COLECTOMY      CYSTOSCOPY W/  RETROGRADES  06/16/2020    Procedure: CYSTOSCOPY, WITH RETROGRADE PYELOGRAM;  Surgeon: Damari Barber Jr., MD;  Location: [facility] OR;  Service: Urology;;    CYSTOSCOPY W/ RETROGRADES Right 04/20/2021    Procedure: CYSTOSCOPY, WITH RETROGRADE PYELOGRAM;  Surgeon: Damari Barber Jr., MD;  Location: [facility] OR;  Service: Urology;  Laterality: Right;    CYSTOSCOPY W/ URETERAL STENT PLACEMENT Right 03/16/2021    Procedure: CYSTOSCOPY, WITH URETERAL STENT INSERTION;  Surgeon: Damari Barber Jr., MD;  Location: [facility] OR;  Service: Urology;  Laterality: Right;    CYSTOSCOPY W/ URETERAL STENT PLACEMENT Right 05/18/2021    Procedure: CYSTOSCOPY, WITH URETERAL STENT INSERTION;  Surgeon: Damari Barber Jr., MD;  Location: [facility] OR;  Service: Urology;  Laterality: Right;    CYSTOSCOPY W/ URETERAL STENT PLACEMENT Right 06/22/2022    Procedure: CYSTOSCOPY, WITH URETERAL STENT INSERTION;  Surgeon: Gaby Garcia MD;  Location: [facility] OR;  Service: Urology;  Laterality: Right;  cysto stent exchange    CYSTOSCOPY W/ URETERAL STENT REMOVAL Right 04/20/2021    Procedure: CYSTOSCOPY, WITH URETERAL STENT REMOVAL;  Surgeon: Damari Barber Jr., MD;  Location: [facility] OR;  Service: Urology;  Laterality: Right;    CYSTOSCOPY WITH URETEROSCOPY, RETROGRADE PYELOGRAPHY, AND INSERTION OF STENT Right 03/30/2021    Procedure: CYSTOSCOPY, WITH RETROGRADE PYELOGRAM AND URETERAL STENT INSERTION;  Surgeon: Damari Barber Jr., MD;  Location: [facility] OR;  Service: Urology;  Laterality: Right;    CYSTOURETEROSCOPY WITH RETROGRADE PYELOGRAPHY AND INSERTION OF STENT INTO URETER Right 01/18/2022    Procedure: CYSTOURETEROSCOPY, WITH RETROGRADE PYELOGRAM AND URETERAL STENT INSERTION;  Surgeon: Damari Barber Jr., MD;  Location: [facility];  Service: Urology;  Laterality: Right;    CYSTOURETEROSCOPY WITH RETROGRADE PYELOGRAPHY AND INSERTION OF STENT INTO URETER Right 8/30/2022    Procedure: CYSTOURETEROSCOPY, WITH RETROGRADE PYELOGRAM AND URETERAL STENT INSERTION;   Surgeon: Damari Barber Jr., MD;  Location: [facility] OR;  Service: Urology;  Laterality: Right;    DIAGNOSTIC LAPAROSCOPY N/A 02/13/2020    Procedure: LAPAROSCOPY, DIAGNOSTIC;  Surgeon: Robbi Lovell III, MD;  Location: [facility] OR;  Service: General;  Laterality: N/A;    HYSTERECTOMY  1980    ILEOSTOMY N/A 02/13/2020    Procedure: CREATION, ILEOSTOMY Loop;  Surgeon: Robbi Lovell III, MD;  Location: [facility] OR;  Service: General;  Laterality: N/A;    ILEOSTOMY CLOSURE N/A 05/18/2020    Procedure: CLOSURE, ILEOSTOMY;  Surgeon: Robbi Lovell III, MD;  Location: [facility] OR;  Service: General;  Laterality: N/A;    LASER LITHOTRIPSY Right 03/30/2021    Procedure: LITHOTRIPSY, USING LASER;  Surgeon: Damari Barber Jr., MD;  Location: [facility] OR;  Service: Urology;  Laterality: Right;    LUMBAR LAMINECTOMY      LYSIS OF ADHESIONS N/A 02/13/2020    Procedure: LYSIS, ADHESIONS;  Surgeon: Robbi Lovell III, MD;  Location: [facility] OR;  Service: General;  Laterality: N/A;    NECK SURGERY      OSTEOTOMY  9/7/2022    Procedure: OSTEOTOMY;  Surgeon: Brigette Chavez MD;  Location: [facility] OR;  Service: General;;    PERCUTANEOUS TRANSLUMINAL ANGIOPLASTY OF ARTERIOVENOUS FISTULA N/A 01/19/2022    Procedure: PTA, AV FISTULA;  Surgeon: Raz Maher MD;  Location: [facility] CATH/EP LAB;  Service: Cardiothoracic;  Laterality: N/A;    PHLEBOGRAPHY  02/28/2020    Procedure: VENOGRAM;  Surgeon: Robbi Lovell III, MD;  Location: [facility] OR;  Service: General;;    REMOVAL OF VASCULAR ACCESS PORT Right 02/13/2020    Procedure: REMOVAL, VASCULAR ACCESS PORT;  Surgeon: Robbi Lovell III, MD;  Location: [facility] OR;  Service: General;  Laterality: Right;    RETROGRADE PYELOGRAPHY  03/16/2021    Procedure: PYELOGRAM, RETROGRADE;  Surgeon: Damari Barber Jr., MD;  Location: [facility] OR;  Service: Urology;;    RETROGRADE PYELOGRAPHY Right 05/18/2021    Procedure: PYELOGRAM, RETROGRADE;  Surgeon: Damari Barber Jr., MD;  Location: [facility] OR;  Service:  Urology;  Laterality: Right;    RETROGRADE PYELOGRAPHY  2022    Procedure: PYELOGRAM, RETROGRADE;  Surgeon: Gaby Garcia MD;  Location: [facility] OR;  Service: Urology;;    SHOULDER SURGERY      TONSILLECTOMY      URETEROSCOPIC REMOVAL OF URETERIC CALCULUS Right 2021    Procedure: REMOVAL, CALCULUS, URETER, URETEROSCOPIC;  Surgeon: Damari Barber Jr., MD;  Location: [facility] OR;  Service: Urology;  Laterality: Right;    URETEROSCOPIC REMOVAL OF URETERIC CALCULUS Right 2021    Procedure: REMOVAL, CALCULUS, URETER, URETEROSCOPIC;  Surgeon: Damari Barber Jr., MD;  Location: [facility] OR;  Service: Urology;  Laterality: Right;    URETEROSCOPIC REMOVAL OF URETERIC CALCULUS Right 2022    Procedure: REMOVAL, CALCULUS, URETER, URETEROSCOPIC;  Surgeon: Damari Barber Jr., MD;  Location: [facility] OR;  Service: Urology;  Laterality: Right;    vulva cancer       Family History   Problem Relation Age of Onset    Heart disease Mother     Hypertension Mother     Cancer Mother         lung    Heart disease Father     Heart disease Sister         Open heart surgery    No Known Problems Daughter      Social History     Tobacco Use    Smoking status: Former     Packs/day: 1.00     Years: 33.00     Pack years: 33.00     Types: Cigarettes     Quit date: 2000     Years since quittin.7    Smokeless tobacco: Former     Quit date:    Substance Use Topics    Alcohol use: No    Drug use: No       Review of patient's allergies indicates:   Allergen Reactions    Ciprofloxacin Other (See Comments)     Elevated liver enzymes      Pcn [penicillins] Anaphylaxis     TOLERATES ROCEPHIN WITHOUT DIFFICULTY       Outpatient meds:  Current Facility-Administered Medications on File Prior to Encounter   Medication Dose Route Frequency Provider Last Rate Last Admin    electrolyte-S (ISOLYTE)   Intravenous Continuous Raz Ruff MD        lactated ringers infusion   Intravenous Continuous Ruy Beltre MD          Current Outpatient Medications on File Prior to Encounter   Medication Sig Dispense Refill    cholecalciferol, vitamin D3, (VITAMIN D3) 25 mcg (1,000 unit) capsule Take 1 capsule (1,000 Units total) by mouth once daily. 10 capsule 0    diltiaZEM (CARDIZEM CD) 120 MG Cp24 Take 1 capsule (120 mg total) by mouth once daily. 90 capsule 3    ferric citrate (AURYXIA) 210 mg iron Tab Take 2 tablets by mouth 3 (three) times daily with meals.      gabapentin (NEURONTIN) 100 MG capsule Take 100 mg by mouth 2 (two) times daily. HCS      hydrALAZINE (APRESOLINE) 50 MG tablet Take 1 tablet (50 mg total) by mouth every 8 (eight) hours. 270 tablet 0    levothyroxine (SYNTHROID) 88 MCG tablet TAKE 1 TABLET BEFORE BREAKFAST (Patient taking differently: Take 88 mcg by mouth before breakfast. TAKE 1 TABLET BEFORE BREAKFAST) 90 tablet 3    LORazepam (ATIVAN) 0.5 MG tablet Take 1 tablet (0.5 mg total) by mouth every evening. 90 tablet 1    promethazine (PHENERGAN) 25 MG tablet TAKE 1 TABLET (25 MG TOTAL) BY MOUTH 2 (TWO) TIMES DAILY AS NEEDED FOR NAUSEA. 60 tablet 0    rOPINIRole (REQUIP) 0.5 MG tablet Take 0.5 mg by mouth every 8 (eight) hours.      sertraline (ZOLOFT) 50 MG tablet Take 1 tablet (50 mg total) by mouth every evening. Take 50 mg by mouth every evening. 90 tablet 1    traMADoL (ULTRAM) 50 mg tablet Take 1 tablet (50 mg total) by mouth every 6 (six) hours as needed. 30 tablet 0    zinc gluconate 50 mg tablet Take 50 mg by mouth once daily.      [DISCONTINUED] miconazole (MICOTIN) 2 % cream Apply topically 2 (two) times daily. 28 g 0       Scheduled meds:   chlorhexidine  15 mL Mouth/Throat BID    diltiaZEM  120 mg Oral Daily    [START ON 9/12/2022] epoetin nica-epbx  10,000 Units Subcutaneous Every Mon, Wed, Fri    gabapentin  100 mg Oral BID    levothyroxine  25 mcg Intravenous Daily    LIDOcaine (PF) 10 mg/ml (1%)  1 mL Other Once    meropenem (MERREM) IVPB  500 mg Intravenous Q24H    mupirocin   Nasal BID     sertraline  50 mg Oral QHS       Infusions:        PRN meds:  sodium chloride, acetaminophen, calcium chloride IVPB, calcium chloride IVPB, calcium chloride IVPB, diazePAM, hydrALAZINE, HYDROmorphone, magnesium oxide, magnesium sulfate IVPB, magnesium sulfate IVPB, magnesium sulfate IVPB, magnesium sulfate IVPB, ondansetron, oxyCODONE, oxyCODONE, potassium chloride, potassium chloride, potassium chloride, potassium chloride, sodium chloride 0.9%, sodium chloride 0.9%, sodium chloride 0.9%, sodium phosphate IVPB, sodium phosphate IVPB, sodium phosphate IVPB, sodium phosphate IVPB, sodium phosphate IVPB    Review of Systems:    OBJECTIVE:     Vital Signs and IO (Last 24H):  Temp:  [96.7 °F (35.9 °C)-98.5 °F (36.9 °C)]   Pulse:  [74-89]   Resp:  [12-35]   BP: ()/(42-75)   SpO2:  [84 %-100 %]   I/O last 3 completed shifts:  In: 2416.1 [P.O.:1250; Blood:616; Other:500; IV Piggyback:50.1]  Out: 2170 [Drains:170; Other:1800; Stool:200]    Wt Readings from Last 5 Encounters:   09/08/22 70.9 kg (156 lb 3.2 oz)   08/30/22 69.4 kg (153 lb)   08/27/22 65.8 kg (145 lb)   08/22/22 67.6 kg (149 lb)   07/20/22 64.4 kg (142 lb)     Physical Exam:  Physical Exam  Constitutional:       General: She is not in acute distress.     Appearance: She is well-developed. She is not diaphoretic.   HENT:      Head: Normocephalic and atraumatic.      Mouth/Throat:      Mouth: Mucous membranes are moist.   Eyes:      General: No scleral icterus.     Pupils: Pupils are equal, round, and reactive to light.   Cardiovascular:      Rate and Rhythm: Normal rate and regular rhythm.   Pulmonary:      Effort: Pulmonary effort is normal. No respiratory distress.      Breath sounds: No stridor.   Abdominal:      General: There is no distension.      Palpations: Abdomen is soft.   Musculoskeletal:         General: No deformity. Normal range of motion.      Cervical back: Neck supple.   Skin:     General: Skin is warm and dry.      Findings: No erythema  or rash.   Neurological:      Mental Status: She is alert and oriented to person, place, and time.      Cranial Nerves: No cranial nerve deficit.   Psychiatric:         Behavior: Behavior normal.       Body mass index is 24.46 kg/m².    Laboratory:  Recent Labs   Lab 09/08/22  0605 09/09/22  0354 09/10/22  0404     139 137 136   K 3.8  3.8 4.0 3.4*     104 104 107   CO2 23  23 18* 19*   BUN 27*  27* 38* 42*   CREATININE 6.4*  6.4* 7.0* 7.2*   GLU 66*  66* 52* 124*         Recent Labs   Lab 09/07/22  1010 09/07/22  1803 09/08/22  0605 09/09/22  0354 09/10/22  0404 09/11/22  0447   CALCIUM 7.8* 7.6* 7.8*  7.8* 7.4* 6.2*  --    ALBUMIN 2.7* 2.1*  --   --  1.7*  --    PHOS  --  5.4*  --   --   --   --    MG 1.3* 1.4*  --  2.2  2.2 1.9 1.9         Recent Labs   Lab 09/30/19  0820 11/15/19  0748   PTH, Intact 176.4 H 238.4 H         No results for input(s): POCTGLUCOSE in the last 168 hours.    Recent Labs   Lab 11/15/19  0748 01/22/20  0848 05/25/21  0847   Hemoglobin A1C Invalid A SEE COMMENT 4.6         Recent Labs   Lab 09/09/22  0354 09/10/22  0404 09/11/22  0807   WBC 14.83* 12.42 8.79   HGB 8.0* 7.4* 9.8*   HCT 25.1* 24.0* 31.7*    143* 200   * 113* 110*   MCHC 31.9* 30.8* 30.9*   MONO 5.8  0.9 5.1  0.6  --          Recent Labs   Lab 09/07/22  1010 09/07/22  1803 09/10/22  0404   BILITOT 0.8 0.9  --    PROT 6.0 4.5*  --    ALBUMIN 2.7* 2.1* 1.7*   ALKPHOS 135 97  --    ALT 12 9*  --    AST 16 16  --          Recent Labs   Lab 04/26/22  1706 06/21/22  1306 06/22/22  1550   Color, UA Yellow Yellow Yellow   Appearance, UA Hazy A Cloudy A Cloudy A   pH, UA 7.0 7.0 7.0   Specific [place], UA 1.010 1.010 1.010   Protein, UA 1+ A 2+ A 2+ A   Glucose, UA Trace A Negative Negative   Ketones, UA Negative Negative Negative   Urobilinogen, UA Negative Negative Negative   Bilirubin (UA) Negative Negative Negative   Occult Blood UA 3+ A 2+ A 3+ A   Nitrite, UA Negative Negative Negative    RBC, UA >100 H 22 H >100 H   WBC, UA >100 H >100 H >100 H   Bacteria Negative Many A Negative   Hyaline Casts, UA 8 A 10 A 444 A         Recent Labs   Lab 12/08/20 2024 06/22/22  1526 09/07/22  1022 09/07/22  1643   POC PH 7.370 7.324 L  --  7.380   POC PCO2 39.0 53.8 H  --  41.4   POC HCO3 22.5 L 28.0  --  24.5   POC PO2 85 40  --  64 HH   POC SATURATED O2 96 69 L  --  92 L   POC BE -3 2  --  -1   Sample ARTERIAL VENOUS VENOUS VENOUS         Microbiology Results (last 7 days)       Procedure Component Value Units Date/Time    Blood culture [[id number]] Collected: 09/07/22 0945    Order Status: Completed Specimen: Blood from Peripheral, Forearm, Right Updated: 09/10/22 1232     Blood Culture, Routine No Growth to date      No Growth to date      No Growth to date      No Growth to date    Blood culture [[id number]] Collected: 09/07/22 1009    Order Status: Completed Specimen: Blood from Peripheral, Ankle, Right Updated: 09/10/22 1232     Blood Culture, Routine No Growth to date      No Growth to date      No Growth to date      No Growth to date          ASSESSMENT/PLAN:     ESRD on HD MWF via AVF  Continue current dialysis prescription.  Next HD per schedule.  Renal diet - low K, low phos.  No IVs or BP checks on access and/or non-dominant arm.    Anemia of CKD  Hgb and HCT are acceptable. Monitor.  Will add ADAM, got 1 PRBC on 9/10.    MBD / Secondary HPT  Ca, phos, PTH and vitamin D levels are acceptable.   Phos binders, vitamin D analogues and calcimimetics as needed.    HTN  BP seem controlled.   Tolerate asymptomatic HTN up to -160.  Continue home meds.  Low sodium diet.    Ischemic colitis.  S/p surgery on 9/8 by Dr. Chavez.    Thank you for allowing us to participate in the care of your patient!   We will follow the patient and provide recommendations as needed.    Patient care time was spent personally by me on the following activities:   Obtaining a history.  Examination of patient.  Providing  medical care at the patients bedside.  Developing a treatment plan with patient or surrogate and bedside caregivers.  Ordering and reviewing laboratory studies, radiographic studies, pulse oximetry.  Ordering and performing treatments and interventions.  Evaluation of patient's response to treatment.  Discussions with consultants while on the unit and immediately available to the patient.  Re-evaluation of the patient's condition.  Documentation in the medical record.     Jorge Webb MD      [facility]  [address] [place] [id number]    [phone] - tel  [phone] - fax    9/11/2022

## 2022-09-11 NOTE — PROGRESS NOTES
[facility] Medicine  Progress Note    Patient Name: Shara Hutchins  MRN: [id number]  Patient Class: IP- Inpatient   Admission Date: 9/7/2022  Length of Stay: 4 days  Attending Physician: Aleksander Phan MD  Primary Care Provider: April Horton MD        Subjective:     Principal Problem:Ischemic colitis        HPI:  72 year old female with a history of ESRD on HD MWF, HTN, hypothyroidism, and chronic diarrhea s/p iliostomy and reversal 2 year back came with abdominal pain .  Started on Friday and complaining of , nausea, vomiting.  Initially she was having only diarrhea but later today she saw blood clots along with stool and came to the hospital.  She is on dialysis and she got dialysis yesterday. Patient has had decreased p.o. intake for the last few days almost not eating at all.  CT of the abdomen  pelvis  was done and found Portal venous air with pneumatosis within the descending colon and sigmoid colon with diffuse wall thickening of the colon. Findings are suggestive of ischemic colitis.  Patient was given IV fluid and IV potassium supplement in the ER.  She is having abdominal pain but not in chest pain or shortness of breath and she is awake alert oriented.  On examination patient abdomen is soft but tender at lower abdomen .  ED physician discussed with surgeon and they will bring her to OR.  Of note there is no free air.      Overview/Hospital Course:  Patient is seen and examined  She complained of pain at the operative site  Colostomy in place and no stool  BP stable without vasopressor support.    9/9  Still having pains. Colostomy some blood , no stool   BP stable . WBC better. May soon need PRBC    9/10  Sitting up   BP OK . Will do HD . Colostomy with some drainage appear liquid stool    9/11  Appear dehydrated . Large stool in the bag . BP stable. Eating well        Interval History:     Review of Systems  As per subjective   Objective:     Vital Signs (Most Recent):  Temp: 97.9  °F (36.6 °C) (09/11/22 1145)  Pulse: 77 (09/11/22 1145)  Resp: 18 (09/11/22 1145)  BP: 128/63 (09/11/22 1145)  SpO2: 97 % (09/11/22 0735) Vital Signs (24h Range):  Temp:  [97 °F (36.1 °C)-98.5 °F (36.9 °C)] 97.9 °F (36.6 °C)  Pulse:  [74-89] 77  Resp:  [12-34] 18  SpO2:  [88 %-99 %] 97 %  BP: ()/(48-75) 128/63     Weight: 70.9 kg (156 lb 3.2 oz)  Body mass index is 24.46 kg/m².    Intake/Output Summary (Last 24 hours) at 9/11/2022 1437  Last data filed at 9/11/2022 0600  Gross per 24 hour   Intake 1320.1 ml   Output 2150 ml   Net -829.9 ml      Physical Exam  Vitals and nursing note reviewed.   HENT:      Head: Normocephalic and atraumatic.   Eyes:      Conjunctiva/sclera: Conjunctivae normal.   Neck:      Vascular: No JVD.   Cardiovascular:      Heart sounds: Normal heart sounds.   Pulmonary:      Effort: Pulmonary effort is normal.   Abdominal:      General: Bowel sounds are normal.      Palpations: Abdomen is soft.   Skin:     General: Skin is warm.   Neurological:      Mental Status: She is alert and oriented to person, place, and time.       Significant Labs: All pertinent labs within the past 24 hours have been reviewed.  CBC:   Recent Labs   Lab 09/10/22  0404 09/11/22  0807   WBC 12.42 8.79   HGB 7.4* 9.8*   HCT 24.0* 31.7*   * 200     CMP:   Recent Labs   Lab 09/10/22  0404      K 3.4*      CO2 19*   *   BUN 42*   CREATININE 7.2*   CALCIUM 6.2*   ALBUMIN 1.7*   ANIONGAP 10     Cardiac Markers: No results for input(s): CKMB, MYOGLOBIN, BNP, TROPISTAT in the last 48 hours.    Significant Imaging: I have reviewed all pertinent imaging results/findings within the past 24 hours.      Assessment/Plan:        Active Hospital Problems    Diagnosis    *Ischemic colitis s/p laprotomy with ischemic sigmoid colon, sigmoid rectum stricture    ESRD on dialysis MWF    Severe dehydration due to chronic diarrhea/high output ileostomy and reversed    Emphysema lung    S/P ureteral stent  placement    Essential hypertension    Status post reversal of ileostomy    Severe malnutrition    DARCI (acute kidney injury)    Hypothyroidism    Anemia of chronic disease    Anemia due to stage 5 chronic kidney disease treated with erythropoietin    Hisotry of Vulvar cancer          PLAN   descending colon and sigmoid colon was resected .   Post op care   Ostomy care    Continue IVF and antibiotics   Follow surgery   Feeding as per surgeon .   IV thyroxine change to PO   Home BP PO meds           VTE Risk Mitigation (From admission, onward)         Ordered     IP VTE HIGH RISK PATIENT  Once         09/07/22 1822     Place sequential compression device  Until discontinued         09/07/22 1822     Place JA hose  Until discontinued         09/07/22 1822     Place sequential compression device  Until discontinued         09/07/22 1258                Discharge Planning   RODRIGO: 9/11/2022     Code Status: Full Code   Is the patient medically ready for discharge?:     Reason for patient still in hospital (select all that apply): Treatment  Discharge Plan A: Assisted Living                  Aleksander Phan MD  Department of Hospital Medicine   [facility]

## 2022-09-11 NOTE — PROGRESS NOTES
General Surgery Progress Note    Admit Date: 9/7/2022  S/P: Procedure(s) (LRB):  LAPAROTOMY, EXPLORATORY (N/A)  COLON RESECTION  OSTEOTOMY    Post-operative Day: 3 Days Post-Op    Hospital Day: 4    SUBJECTIVE:   Seen at bedside. Patient has started to have ostomy output and tolerated diet. Still with abdominal pain.  JOHN drain remains in place.    OBJECTIVE:     Vital Signs (Most Recent)  Temp:  [96.7 °F (35.9 °C)-98.8 °F (37.1 °C)] 98.5 °F (36.9 °C)  Pulse:  [75-89] 79  Resp:  [12-35] 19  SpO2:  [84 %-100 %] 88 %  BP: ()/(42-59) 100/56    I&Os:  I/O last 3 completed shifts:  In: 2746.1 [P.O.:1580; Blood:616; Other:500; IV Piggyback:50.1]  Out: 2175 [Drains:175; Other:1800; Stool:200]    Physical Exam:  Gen: NAD, AAOx3  HEENT: Anicteric sclera  Pulm: unlabored, symmetrical   Abd: Soft, tenderness palpation but appropriate, no rebound, no guarding, ostomy pink and viable with brown liquid present, midline incision intact, JOHN drain with serous output    Laboratory:  CBC:   Recent Labs   Lab 09/10/22  0404   WBC 12.42   RBC 2.12*   HGB 7.4*   HCT 24.0*   *   *   MCH 34.9*   MCHC 30.8*     CMP:   Recent Labs   Lab 09/07/22  1803 09/08/22  0014 09/10/22  0404   GLU 77   < > 124*   CALCIUM 7.6*   < > 6.2*   ALBUMIN 2.1*  --  1.7*   PROT 4.5*  --   --       < > 136   K 2.9*   < > 3.4*   CO2 22*   < > 19*      < > 107   BUN 20   < > 42*   CREATININE 5.4*   < > 7.2*   ALKPHOS 97  --   --    ALT 9*  --   --    AST 16  --   --    BILITOT 0.9  --   --     < > = values in this interval not displayed.     Labs within the past 24 hours have been reviewed.    ASSESSMENT/PLAN:     Patient Active Problem List    Diagnosis Date Noted    Ischemic colitis s/p laprotomy with ischemic sigmoid colon, sigmoid rectum stricture 09/07/2022    COVID 06/28/2022    Ureterolithiasis     Chest pain 06/16/2022    Emphysema lung 06/16/2022    Vitamin B12 deficiency 04/27/2022    History of cancer of vulva     Secondary  hyperparathyroidism of renal origin 02/08/2022    Stenosis of other vascular prosthetic devices, implants and grafts, initial encounter 02/08/2022    Thrombocytopenia 01/17/2022    Vulvar cellulitis     Lymphedema due to radiation     Urinary incontinence     S/P ureteral stent placement     Acute on chronic anemia 09/15/2021    Hypomagnesemia 05/18/2021    Sepsis 05/17/2021    Nephrolithiasis     Right hydroureter secondary to obstructing calculi 03/15/2021    Right-sided pyelonephritis 03/15/2021    ESRD on dialysis MWF 03/15/2021    Macrocytic anemia 03/15/2021    Constipation 03/15/2021    Bilateral LE neuropathy 03/15/2021    Essential hypertension 03/15/2021    Pneumonia due to infectious organism 12/10/2020    Perineal rash, female 12/09/2020    Perianal cellulitis 12/09/2020    Chronic diarrhea 05/21/2020    Mild protein-energy malnutrition 05/21/2020    Status post reversal of ileostomy 05/18/2020    Acute hypoxemic respiratory failure 03/18/2020    Malnutrition 03/07/2020    History of difficult venous access 03/06/2020    Severe dehydration due to chronic diarrhea/high output ileostomy and reversed 02/24/2020    Electrolyte imbalance 02/24/2020    Severe malnutrition 02/24/2020    DARCI (acute kidney injury) 02/23/2020    Hypothyroidism 02/10/2020    Anemia of chronic disease 02/10/2020    Nausea & vomiting 02/10/2020    Chronic diarrhea 02/10/2020    Anemia due to stage 5 chronic kidney disease treated with erythropoietin     Anemia of decreased vitamin B12 absorption 01/21/2019    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 female   -being transferred to floor  -advance diet as tolerated now that ostomy output has begun  -received unit of PRBCs on HD  -has ambulated chair and sat upright, needs continued work on physical therapy

## 2022-09-11 NOTE — PROGRESS NOTES
General Surgery Progress Note    Admit Date: 9/7/2022  S/P: Procedure(s) (LRB):  LAPAROTOMY, EXPLORATORY (N/A)  COLON RESECTION  OSTEOTOMY    Post-operative Day: 4 Days Post-Op    Hospital Day: 5    SUBJECTIVE:   Having ostomy output, mostly liquid.  Tolerated some diet. JOHN drain with a significant amount of serous fluid. Tolerating HD. Complaining of significant abdominal pain with movements and ambulation. No significant pain at rest.    OBJECTIVE:     Vital Signs (Most Recent)  Temp:  [96.9 °F (36.1 °C)-98.5 °F (36.9 °C)] 97.9 °F (36.6 °C)  Pulse:  [74-89] 77  Resp:  [12-34] 18  SpO2:  [85 %-100 %] 97 %  BP: ()/(42-75) 128/63    I&Os:  I/O last 3 completed shifts:  In: 2416.1 [P.O.:1250; Blood:616; Other:500; IV Piggyback:50.1]  Out: 2170 [Drains:170; Other:1800; Stool:200]    Physical Exam:  Gen: NAD, AAOx3  HEENT: Anicteric sclera  Pulm: unlabored, symmetrical   Abd: Soft, tenderness palpation but appropriate, no rebound, no guarding, ostomy pink and viable with brown liquid present, midline incision intact, JOHN drain with serous output    Laboratory:  CBC:   Recent Labs   Lab 09/11/22  0807   WBC 8.79   RBC 2.89*   HGB 9.8*   HCT 31.7*      *   MCH 33.9*   MCHC 30.9*       CMP:   Recent Labs   Lab 09/07/22  1803 09/08/22  0014 09/10/22  0404   GLU 77   < > 124*   CALCIUM 7.6*   < > 6.2*   ALBUMIN 2.1*  --  1.7*   PROT 4.5*  --   --       < > 136   K 2.9*   < > 3.4*   CO2 22*   < > 19*      < > 107   BUN 20   < > 42*   CREATININE 5.4*   < > 7.2*   ALKPHOS 97  --   --    ALT 9*  --   --    AST 16  --   --    BILITOT 0.9  --   --     < > = values in this interval not displayed.       Labs within the past 24 hours have been reviewed.    ASSESSMENT/PLAN:     Patient Active Problem List    Diagnosis Date Noted    Ischemic colitis s/p laprotomy with ischemic sigmoid colon, sigmoid rectum stricture 09/07/2022    COVID 06/28/2022    Ureterolithiasis     Chest pain 06/16/2022    Emphysema  lung 06/16/2022    Vitamin B12 deficiency 04/27/2022    History of cancer of vulva     Secondary hyperparathyroidism of renal origin 02/08/2022    Stenosis of other vascular prosthetic devices, implants and grafts, initial encounter 02/08/2022    Thrombocytopenia 01/17/2022    Vulvar cellulitis     Lymphedema due to radiation     Urinary incontinence     S/P ureteral stent placement     Acute on chronic anemia 09/15/2021    Hypomagnesemia 05/18/2021    Sepsis 05/17/2021    Nephrolithiasis     Right hydroureter secondary to obstructing calculi 03/15/2021    Right-sided pyelonephritis 03/15/2021    ESRD on dialysis MWF 03/15/2021    Macrocytic anemia 03/15/2021    Constipation 03/15/2021    Bilateral LE neuropathy 03/15/2021    Essential hypertension 03/15/2021    Pneumonia due to infectious organism 12/10/2020    Perineal rash, female 12/09/2020    Perianal cellulitis 12/09/2020    Chronic diarrhea 05/21/2020    Mild protein-energy malnutrition 05/21/2020    Status post reversal of ileostomy 05/18/2020    Acute hypoxemic respiratory failure 03/18/2020    Malnutrition 03/07/2020    History of difficult venous access 03/06/2020    Severe dehydration due to chronic diarrhea/high output ileostomy and reversed 02/24/2020    Electrolyte imbalance 02/24/2020    Severe malnutrition 02/24/2020    DARCI (acute kidney injury) 02/23/2020    Hypothyroidism 02/10/2020    Anemia of chronic disease 02/10/2020    Nausea & vomiting 02/10/2020    Chronic diarrhea 02/10/2020    Anemia due to stage 5 chronic kidney disease treated with erythropoietin     Anemia of decreased vitamin B12 absorption 01/21/2019    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 female   -continues diet as tolerated, will ask boost renal  -mobility and ambulation still limited due to abdominal discomfort, encouraged continue working with PT, out of bed to chair, etc.  -hemoglobin responded appropriately to  transfusion  -high serous output from JOHN drain likely partially related to hypoalbuminemia, again encouraged diet as tolerated and will add supplement  -not ready for discharge yet

## 2022-09-11 NOTE — SUBJECTIVE & OBJECTIVE
Interval History:     Review of Systems  As per subjective   Objective:     Vital Signs (Most Recent):  Temp: 97.9 °F (36.6 °C) (09/11/22 1145)  Pulse: 77 (09/11/22 1145)  Resp: 18 (09/11/22 1145)  BP: 128/63 (09/11/22 1145)  SpO2: 97 % (09/11/22 0735) Vital Signs (24h Range):  Temp:  [97 °F (36.1 °C)-98.5 °F (36.9 °C)] 97.9 °F (36.6 °C)  Pulse:  [74-89] 77  Resp:  [12-34] 18  SpO2:  [88 %-99 %] 97 %  BP: ()/(48-75) 128/63     Weight: 70.9 kg (156 lb 3.2 oz)  Body mass index is 24.46 kg/m².    Intake/Output Summary (Last 24 hours) at 9/11/2022 1437  Last data filed at 9/11/2022 0600  Gross per 24 hour   Intake 1320.1 ml   Output 2150 ml   Net -829.9 ml      Physical Exam  Vitals and nursing note reviewed.   HENT:      Head: Normocephalic and atraumatic.   Eyes:      Conjunctiva/sclera: Conjunctivae normal.   Neck:      Vascular: No JVD.   Cardiovascular:      Heart sounds: Normal heart sounds.   Pulmonary:      Effort: Pulmonary effort is normal.   Abdominal:      General: Bowel sounds are normal.      Palpations: Abdomen is soft.   Skin:     General: Skin is warm.   Neurological:      Mental Status: She is alert and oriented to person, place, and time.       Significant Labs: All pertinent labs within the past 24 hours have been reviewed.  CBC:   Recent Labs   Lab 09/10/22  0404 09/11/22  0807   WBC 12.42 8.79   HGB 7.4* 9.8*   HCT 24.0* 31.7*   * 200     CMP:   Recent Labs   Lab 09/10/22  0404      K 3.4*      CO2 19*   *   BUN 42*   CREATININE 7.2*   CALCIUM 6.2*   ALBUMIN 1.7*   ANIONGAP 10     Cardiac Markers: No results for input(s): CKMB, MYOGLOBIN, BNP, TROPISTAT in the last 48 hours.    Significant Imaging: I have reviewed all pertinent imaging results/findings within the past 24 hours.

## 2022-09-11 NOTE — CARE UPDATE
09/11/22 0735   PRE-TX-O2   O2 Device (Oxygen Therapy) nasal cannula   $ Is the patient on Low Flow Oxygen? Yes   Flow (L/min) 2   SpO2 97 %   Pulse Oximetry Type Intermittent   $ Pulse Oximetry - Multiple Charge Pulse Oximetry - Multiple   Pulse 75   Resp 18   Respiratory Evaluation   $ Care Plan Tech Time 15 min

## 2022-09-12 NOTE — PROGRESS NOTES
[facility] Medicine  Progress Note    Patient Name: Shara Hutchins  MRN: [id number]  Patient Class: IP- Inpatient   Admission Date: 9/7/2022  Length of Stay: 5 days  Attending Physician: Aleksander Phan MD  Primary Care Provider: April Horton MD        Subjective:     Principal Problem:Ischemic colitis        HPI:  72 year old female with a history of ESRD on HD MWF, HTN, hypothyroidism, and chronic diarrhea s/p iliostomy and reversal 2 year back came with abdominal pain .  Started on Friday and complaining of , nausea, vomiting.  Initially she was having only diarrhea but later today she saw blood clots along with stool and came to the hospital.  She is on dialysis and she got dialysis yesterday. Patient has had decreased p.o. intake for the last few days almost not eating at all.  CT of the abdomen  pelvis  was done and found Portal venous air with pneumatosis within the descending colon and sigmoid colon with diffuse wall thickening of the colon. Findings are suggestive of ischemic colitis.  Patient was given IV fluid and IV potassium supplement in the ER.  She is having abdominal pain but not in chest pain or shortness of breath and she is awake alert oriented.  On examination patient abdomen is soft but tender at lower abdomen .  ED physician discussed with surgeon and they will bring her to OR.  Of note there is no free air.      Overview/Hospital Course:  Patient is seen and examined  She complained of pain at the operative site  Colostomy in place and no stool  BP stable without vasopressor support.    9/9  Still having pains. Colostomy some blood , no stool   BP stable . WBC better. May soon need PRBC    9/10  Sitting up   BP OK . Will do HD . Colostomy with some drainage appear liquid stool    9/11  Appear dehydrated . Large stool in the bag . BP stable. Eating well    9/12  Colostomy with stool . Getting HD now .  Increased in drain output . ++ noted . Hb stable          Interval  History:     Review of Systems  As per subjective   Objective:     Vital Signs (Most Recent):  Temp: 98 °F (36.7 °C) (09/12/22 0945)  Pulse: 79 (09/12/22 1100)  Resp: 19 (09/12/22 0945)  BP: (!) 144/64 (09/12/22 1100)  SpO2: 99 % (09/12/22 0757)   Vital Signs (24h Range):  Temp:  [97.7 °F (36.5 °C)-99.1 °F (37.3 °C)] 98 °F (36.7 °C)  Pulse:  [70-85] 79  Resp:  [16-19] 19  SpO2:  [95 %-99 %] 99 %  BP: (103-161)/(47-72) 144/64     Weight: 70.9 kg (156 lb 3.2 oz)  Body mass index is 24.46 kg/m².    Intake/Output Summary (Last 24 hours) at 9/12/2022 1124  Last data filed at 9/12/2022 0701  Gross per 24 hour   Intake 120 ml   Output 820 ml   Net -700 ml      Physical Exam  Vitals and nursing note reviewed.   HENT:      Head: Normocephalic and atraumatic.   Eyes:      Conjunctiva/sclera: Conjunctivae normal.   Neck:      Vascular: No JVD.   Cardiovascular:      Rate and Rhythm: Normal rate.      Heart sounds: Normal heart sounds.   Pulmonary:      Effort: Pulmonary effort is normal.   Abdominal:      Palpations: Abdomen is soft.   Musculoskeletal:         General: Normal range of motion.   Skin:     General: Skin is warm.   Neurological:      Mental Status: She is alert and oriented to person, place, and time.   Psychiatric:         Mood and Affect: Mood normal.       Significant Labs: All pertinent labs within the past 24 hours have been reviewed.  CBC:   Recent Labs   Lab 09/11/22  0807 09/12/22  0609   WBC 8.79 8.28   HGB 9.8* 9.1*   HCT 31.7* 29.4*    195     CMP:   Recent Labs   Lab 09/12/22  0609   *   K 3.4*      CO2 21*   GLU 74   BUN 30*   CREATININE 6.4*   CALCIUM 7.6*   ANIONGAP 10     Cardiac Markers: No results for input(s): CKMB, MYOGLOBIN, BNP, TROPISTAT in the last 48 hours.    Significant Imaging: I have reviewed all pertinent imaging results/findings within the past 24 hours.      Assessment/Plan:      Active Hospital Problems    Diagnosis    *Ischemic colitis s/p laprotomy with  ischemic sigmoid colon, sigmoid rectum stricture    ESRD on dialysis MWF    Severe dehydration due to chronic diarrhea/high output ileostomy and reversed    Emphysema lung    S/P ureteral stent placement    Essential hypertension    Status post reversal of ileostomy    Severe malnutrition    DARCI (acute kidney injury)    Hypothyroidism    Anemia of chronic disease    Anemia due to stage 5 chronic kidney disease treated with erythropoietin    Hisotry of Vulvar cancer     PLAN   descending colon and sigmoid colon was resected .   Post op care   Ostomy care    Continue  antibiotics   Follow surgery   Advance diet    IV thyroxine change to PO   Home BP PO meds   Watch out pelvis drain output   Possible DC tomorrow ? Patient refused to go other facility and she just want to go back to [facility]           VTE Risk Mitigation (From admission, onward)         Ordered     IP VTE HIGH RISK PATIENT  Once         09/07/22 1822     Place sequential compression device  Until discontinued         09/07/22 1822     Place JA hose  Until discontinued         09/07/22 1822     Place sequential compression device  Until discontinued         09/07/22 1258                Discharge Planning   RODRIGO: 9/12/2022     Code Status: Full Code   Is the patient medically ready for discharge?:     Reason for patient still in hospital (select all that apply): Treatment  Discharge Plan A: Assisted Living                  Aleksander Phan MD  Department of Hospital Medicine   [facility]

## 2022-09-12 NOTE — PROGRESS NOTES
Pt educated on tx options   09/12/22 1310   Handoff Report   Received From [facility]   Vital Signs   Temp 98 °F (36.7 °C)   Pulse 66   Resp 18   /70   Post-Hemodialysis Assessment   Rinseback Volume (mL) 250 mL   Blood Volume Processed (Liters) 50.1 L   Dialyzer Clearance Lightly streaked   Duration of Treatment 180 minutes   Additional Fluid Intake (mL) 500 mL   Total UF (mL) 2500 mL   Net Fluid Removal 2000   Patient Response to Treatment VSS   Post-Treatment Weight 68.9 kg (151 lb 14.4 oz)   Treatment Weight Change -2   Arterial bleeding stop time (min) 10 min   Venous bleeding stop time (min) 10 min   Post-Hemodialysis Comments END OF TX

## 2022-09-12 NOTE — PROGRESS NOTES
Nephrology Progress Note        Patient Name: Shara Hutchins  MRN: [id number]    Patient Class: IP- Inpatient   Admission Date: 9/7/2022  Length of Stay: 5 days  Date of Service: 9/12/2022    Attending Physician: Aleksander Phan MD  Primary Care Provider: April Horton MD    Reason for Consult: ESRD/ANEMIA/HTN/SHPT/ischemic colitis    SUBJECTIVE:     HPI: 72-year-old ESRD on HD MWF and history of multiple abdominal surgeries for ischemic colitis presents with bloody diarrhea and severe abdominal pain, a was diagnosed with ischemic colitis and is going for emergent surgery.    9/8  labs reviewed, K+ better.  No acute clearance needs today.  C/o post op pain, no other complaints.  Next HD tomorrow.  9/9 VSS. HD today. Started clears today.  9/10 VSS. HD deferred yesterday. Seen on HD today. Will add epoetin as well, getting 1 PRBC too.  9/11 VSS, no new complains. HD on Monday.  9/12 having difficulty with cannulation of AVF- ordered u/s    Outpatient meds:  Current Facility-Administered Medications on File Prior to Encounter   Medication Dose Route Frequency Provider Last Rate Last Admin    electrolyte-S (ISOLYTE)   Intravenous Continuous Raz Ruff MD        lactated ringers infusion   Intravenous Continuous Ruy Beltre MD         Current Outpatient Medications on File Prior to Encounter   Medication Sig Dispense Refill    cholecalciferol, vitamin D3, (VITAMIN D3) 25 mcg (1,000 unit) capsule Take 1 capsule (1,000 Units total) by mouth once daily. 10 capsule 0    diltiaZEM (CARDIZEM CD) 120 MG Cp24 Take 1 capsule (120 mg total) by mouth once daily. 90 capsule 3    ferric citrate (AURYXIA) 210 mg iron Tab Take 2 tablets by mouth 3 (three) times daily with meals.      gabapentin (NEURONTIN) 100 MG capsule Take 100 mg by mouth 2 (two) times daily. HCS      hydrALAZINE (APRESOLINE) 50 MG tablet Take 1 tablet (50 mg total) by mouth every 8 (eight) hours. 270 tablet 0    levothyroxine (SYNTHROID) 88 MCG tablet TAKE  1 TABLET BEFORE BREAKFAST (Patient taking differently: Take 88 mcg by mouth before breakfast. TAKE 1 TABLET BEFORE BREAKFAST) 90 tablet 3    LORazepam (ATIVAN) 0.5 MG tablet Take 1 tablet (0.5 mg total) by mouth every evening. 90 tablet 1    promethazine (PHENERGAN) 25 MG tablet TAKE 1 TABLET (25 MG TOTAL) BY MOUTH 2 (TWO) TIMES DAILY AS NEEDED FOR NAUSEA. 60 tablet 0    rOPINIRole (REQUIP) 0.5 MG tablet Take 0.5 mg by mouth every 8 (eight) hours.      sertraline (ZOLOFT) 50 MG tablet Take 1 tablet (50 mg total) by mouth every evening. Take 50 mg by mouth every evening. 90 tablet 1    traMADoL (ULTRAM) 50 mg tablet Take 1 tablet (50 mg total) by mouth every 6 (six) hours as needed. 30 tablet 0    zinc gluconate 50 mg tablet Take 50 mg by mouth once daily.      [DISCONTINUED] miconazole (MICOTIN) 2 % cream Apply topically 2 (two) times daily. 28 g 0       Scheduled meds:   chlorhexidine  15 mL Mouth/Throat BID    diltiaZEM  120 mg Oral Daily    epoetin nica-epbx  10,000 Units Subcutaneous Every Mon, Wed, Fri    gabapentin  100 mg Oral BID    levothyroxine  88 mcg Oral Before breakfast    LIDOcaine (PF) 10 mg/ml (1%)  1 mL Other Once    meropenem (MERREM) IVPB  500 mg Intravenous Q24H    mupirocin   Nasal BID    sertraline  50 mg Oral QHS       Infusions:        PRN meds:  sodium chloride, acetaminophen, calcium chloride IVPB, calcium chloride IVPB, calcium chloride IVPB, diazePAM, hydrALAZINE, HYDROmorphone, magnesium oxide, magnesium sulfate IVPB, magnesium sulfate IVPB, magnesium sulfate IVPB, magnesium sulfate IVPB, ondansetron, oxyCODONE, oxyCODONE, potassium chloride, potassium chloride, potassium chloride, potassium chloride, sodium chloride 0.9%, sodium chloride 0.9%, sodium chloride 0.9%, sodium phosphate IVPB, sodium phosphate IVPB, sodium phosphate IVPB, sodium phosphate IVPB, sodium phosphate IVPB    Review of Systems:  Neg    OBJECTIVE:     Vital Signs and IO (Last 24H):  Temp:  [97.7 °F (36.5 °C)-99.1 °F  (37.3 °C)]   Pulse:  [70-85]   Resp:  [16-19]   BP: (103-161)/(47-72)   SpO2:  [95 %-99 %]   I/O last 3 completed shifts:  In: 890.1 [P.O.:840; IV Piggyback:50.1]  Out: 720 [Drains:720]    Wt Readings from Last 5 Encounters:   09/08/22 70.9 kg (156 lb 3.2 oz)   08/30/22 69.4 kg (153 lb)   08/27/22 65.8 kg (145 lb)   08/22/22 67.6 kg (149 lb)   07/20/22 64.4 kg (142 lb)     Physical Exam:  Constitutional: nad, aao x 3  Heart: rrr, no m/r/g, wwp, no edema  Lungs: ctab, no w/r/r/c, no lb  Abdomen: s/nt/nd, +BS      Body mass index is 24.46 kg/m².    Laboratory:  Recent Labs   Lab 09/09/22  0354 09/10/22  0404 09/12/22  0609    136 134*   K 4.0 3.4* 3.4*    107 103   CO2 18* 19* 21*   BUN 38* 42* 30*   CREATININE 7.0* 7.2* 6.4*   GLU 52* 124* 74         Recent Labs   Lab 09/07/22  1010 09/07/22  1803 09/08/22  0605 09/09/22  0354 09/10/22  0404 09/11/22  0447 09/12/22  0609   CALCIUM 7.8* 7.6*   < > 7.4* 6.2*  --  7.6*   ALBUMIN 2.7* 2.1*  --   --  1.7*  --   --    PHOS  --  5.4*  --   --   --   --   --    MG 1.3* 1.4*  --  2.2  2.2 1.9 1.9 1.8    < > = values in this interval not displayed.         Recent Labs   Lab 09/30/19  0820 11/15/19  0748   PTH, Intact 176.4 H 238.4 H         No results for input(s): POCTGLUCOSE in the last 168 hours.    Recent Labs   Lab 11/15/19  0748 01/22/20  0848 05/25/21  0847   Hemoglobin A1C Invalid A SEE COMMENT 4.6         Recent Labs   Lab 09/09/22  0354 09/10/22  0404 09/11/22  0807 09/12/22  0609   WBC 14.83* 12.42 8.79 8.28   HGB 8.0* 7.4* 9.8* 9.1*   HCT 25.1* 24.0* 31.7* 29.4*    143* 200 195   * 113* 110* 109*   MCHC 31.9* 30.8* 30.9* 31.0*   MONO 5.8  0.9 5.1  0.6  --   --          Recent Labs   Lab 09/07/22  1010 09/07/22  1803 09/10/22  0404   BILITOT 0.8 0.9  --    PROT 6.0 4.5*  --    ALBUMIN 2.7* 2.1* 1.7*   ALKPHOS 135 97  --    ALT 12 9*  --    AST 16 16  --          Recent Labs   Lab 04/26/22  1706 06/21/22  1306 06/22/22  1550   Color, UA  Yellow Yellow Yellow   Appearance, UA Hazy A Cloudy A Cloudy A   pH, UA 7.0 7.0 7.0   Specific [place], UA 1.010 1.010 1.010   Protein, UA 1+ A 2+ A 2+ A   Glucose, UA Trace A Negative Negative   Ketones, UA Negative Negative Negative   Urobilinogen, UA Negative Negative Negative   Bilirubin (UA) Negative Negative Negative   Occult Blood UA 3+ A 2+ A 3+ A   Nitrite, UA Negative Negative Negative   RBC, UA >100 H 22 H >100 H   WBC, UA >100 H >100 H >100 H   Bacteria Negative Many A Negative   Hyaline Casts, UA 8 A 10 A 444 A         Recent Labs   Lab 12/08/20 2024 06/22/22  1526 09/07/22  1022 09/07/22  1643   POC PH 7.370 7.324 L  --  7.380   POC PCO2 39.0 53.8 H  --  41.4   POC HCO3 22.5 L 28.0  --  24.5   POC PO2 85 40  --  64 HH   POC SATURATED O2 96 69 L  --  92 L   POC BE -3 2  --  -1   Sample ARTERIAL VENOUS VENOUS VENOUS         Microbiology Results (last 7 days)       Procedure Component Value Units Date/Time    Blood culture [[id number]] Collected: 09/07/22 0945    Order Status: Completed Specimen: Blood from Peripheral, Forearm, Right Updated: 09/12/22 1232     Blood Culture, Routine No growth after 5 days.    Blood culture [[id number]] Collected: 09/07/22 1009    Order Status: Completed Specimen: Blood from Peripheral, Ankle, Right Updated: 09/12/22 1232     Blood Culture, Routine No growth after 5 days.          ASSESSMENT/PLAN:     ESRD on HD MWF via AVF  Ordered ultrasound of access    Anemia of CKD  Transfused this admission  Continue ADAM with HD    MBD / Secondary HPT  Renal diet    HTN  Controlled    Ischemic colitis.  S/p surgery on 9/8 by Dr. Chavez    Thank you for allowing us to participate in the care of your patient!   We will follow the patient and provide recommendations as needed.    Patient care time was spent personally by me on the following activities: > 35 min  Obtaining a history.  Examination of patient.  Providing medical care at the patients bedside.  Developing a treatment plan  with patient or surrogate and bedside caregivers.  Ordering and reviewing laboratory studies, radiographic studies, pulse oximetry.  Ordering and performing treatments and interventions.  Evaluation of patient's response to treatment.  Discussions with consultants while on the unit and immediately available to the patient.  Re-evaluation of the patient's condition.  Documentation in the medical record.     Yasmin Parker MD      [facility]  [address] [place] [id number]    [phone] - tel  [phone] - fax    9/12/2022

## 2022-09-12 NOTE — SUBJECTIVE & OBJECTIVE
Interval History: tired, otherwise doing ok    Medications:  Continuous Infusions:  Scheduled Meds:   chlorhexidine  15 mL Mouth/Throat BID    diltiaZEM  120 mg Oral Daily    epoetin nica-epbx  10,000 Units Subcutaneous Every Mon, Wed, Fri    gabapentin  100 mg Oral BID    levothyroxine  88 mcg Oral Before breakfast    LIDOcaine (PF) 10 mg/ml (1%)  1 mL Other Once    meropenem (MERREM) IVPB  500 mg Intravenous Q24H    mupirocin   Nasal BID    sertraline  50 mg Oral QHS     PRN Meds:sodium chloride, acetaminophen, calcium chloride IVPB, calcium chloride IVPB, calcium chloride IVPB, diazePAM, hydrALAZINE, HYDROmorphone, magnesium oxide, magnesium sulfate IVPB, magnesium sulfate IVPB, magnesium sulfate IVPB, magnesium sulfate IVPB, ondansetron, oxyCODONE, oxyCODONE, potassium chloride, potassium chloride, potassium chloride, potassium chloride, sodium chloride 0.9%, sodium chloride 0.9%, sodium chloride 0.9%, sodium phosphate IVPB, sodium phosphate IVPB, sodium phosphate IVPB, sodium phosphate IVPB, sodium phosphate IVPB     Review of patient's allergies indicates:   Allergen Reactions    Ciprofloxacin Other (See Comments)     Elevated liver enzymes      Pcn [penicillins] Anaphylaxis     TOLERATES ROCEPHIN WITHOUT DIFFICULTY     Objective:     Vital Signs (Most Recent):  Temp: 98.4 °F (36.9 °C) (09/12/22 1716)  Pulse: 84 (09/12/22 1716)  Resp: 18 (09/12/22 1716)  BP: (!) 160/73 (09/12/22 1716)  SpO2: 97 % (09/12/22 1716)   Vital Signs (24h Range):  Temp:  [98 °F (36.7 °C)-99.1 °F (37.3 °C)] 98.4 °F (36.9 °C)  Pulse:  [65-85] 84  Resp:  [16-19] 18  SpO2:  [95 %-99 %] 97 %  BP: (103-161)/(47-73) 160/73     Weight: 70.9 kg (156 lb 3.2 oz)  Body mass index is 24.46 kg/m².    Intake/Output - Last 3 Shifts         09/10 0700  09/11 0659 09/11 0700  09/12 0659 09/12 0700  09/13 0659    P.O. 770 720     Blood 616      Other 500  500    IV Piggyback 50.1      Total Intake(mL/kg) 1936.1 (27.3) 720 (10.2) 500 (7.1)    Drains 150  680 20    Other 1800  2500    Stool 200  200    Total Output 2150 680 2720    Net -213.9 +40 -2220                   Physical Exam  Abdominal:      General: Abdomen is flat. There is no distension.      Palpations: Abdomen is soft.      Tenderness: There is no abdominal tenderness.      Comments: Stool from ostomy       Significant Labs:  I have reviewed all pertinent lab results within the past 24 hours.  CBC:   Recent Labs   Lab 09/12/22  0609   WBC 8.28   RBC 2.71*   HGB 9.1*   HCT 29.4*      *   MCH 33.6*   MCHC 31.0*     BMP:   Recent Labs   Lab 09/12/22  0609   GLU 74   *   K 3.4*      CO2 21*   BUN 30*   CREATININE 6.4*   CALCIUM 7.6*   MG 1.8       Significant Diagnostics:  I have reviewed all pertinent imaging results/findings within the past 24 hours.

## 2022-09-12 NOTE — SUBJECTIVE & OBJECTIVE
Interval History:     Review of Systems  As per subjective   Objective:     Vital Signs (Most Recent):  Temp: 98 °F (36.7 °C) (09/12/22 0945)  Pulse: 79 (09/12/22 1100)  Resp: 19 (09/12/22 0945)  BP: (!) 144/64 (09/12/22 1100)  SpO2: 99 % (09/12/22 0757)   Vital Signs (24h Range):  Temp:  [97.7 °F (36.5 °C)-99.1 °F (37.3 °C)] 98 °F (36.7 °C)  Pulse:  [70-85] 79  Resp:  [16-19] 19  SpO2:  [95 %-99 %] 99 %  BP: (103-161)/(47-72) 144/64     Weight: 70.9 kg (156 lb 3.2 oz)  Body mass index is 24.46 kg/m².    Intake/Output Summary (Last 24 hours) at 9/12/2022 1124  Last data filed at 9/12/2022 0701  Gross per 24 hour   Intake 120 ml   Output 820 ml   Net -700 ml      Physical Exam  Vitals and nursing note reviewed.   HENT:      Head: Normocephalic and atraumatic.   Eyes:      Conjunctiva/sclera: Conjunctivae normal.   Neck:      Vascular: No JVD.   Cardiovascular:      Rate and Rhythm: Normal rate.      Heart sounds: Normal heart sounds.   Pulmonary:      Effort: Pulmonary effort is normal.   Abdominal:      Palpations: Abdomen is soft.   Musculoskeletal:         General: Normal range of motion.   Skin:     General: Skin is warm.   Neurological:      Mental Status: She is alert and oriented to person, place, and time.   Psychiatric:         Mood and Affect: Mood normal.       Significant Labs: All pertinent labs within the past 24 hours have been reviewed.  CBC:   Recent Labs   Lab 09/11/22  0807 09/12/22  0609   WBC 8.79 8.28   HGB 9.8* 9.1*   HCT 31.7* 29.4*    195     CMP:   Recent Labs   Lab 09/12/22  0609   *   K 3.4*      CO2 21*   GLU 74   BUN 30*   CREATININE 6.4*   CALCIUM 7.6*   ANIONGAP 10     Cardiac Markers: No results for input(s): CKMB, MYOGLOBIN, BNP, TROPISTAT in the last 48 hours.    Significant Imaging: I have reviewed all pertinent imaging results/findings within the past 24 hours.

## 2022-09-12 NOTE — PROGRESS NOTES
[facility]  General Surgery  Progress Note    Subjective:     History of Present Illness:  72-year-old history of multiple abdominal surgeries for ischemic colitis presents with diarrhea and severe abdominal pain.  She did note the diarrhea became bloody.  Diagnosed with ischemic colitis and I am consulted for surgery.      Post-Op Info:  Procedure(s) (LRB):  LAPAROTOMY, EXPLORATORY (N/A)  COLON RESECTION  OSTEOTOMY   5 Days Post-Op     Interval History: tired, otherwise doing ok    Medications:  Continuous Infusions:  Scheduled Meds:   chlorhexidine  15 mL Mouth/Throat BID    diltiaZEM  120 mg Oral Daily    epoetin nica-epbx  10,000 Units Subcutaneous Every Mon, Wed, Fri    gabapentin  100 mg Oral BID    levothyroxine  88 mcg Oral Before breakfast    LIDOcaine (PF) 10 mg/ml (1%)  1 mL Other Once    meropenem (MERREM) IVPB  500 mg Intravenous Q24H    mupirocin   Nasal BID    sertraline  50 mg Oral QHS     PRN Meds:sodium chloride, acetaminophen, calcium chloride IVPB, calcium chloride IVPB, calcium chloride IVPB, diazePAM, hydrALAZINE, HYDROmorphone, magnesium oxide, magnesium sulfate IVPB, magnesium sulfate IVPB, magnesium sulfate IVPB, magnesium sulfate IVPB, ondansetron, oxyCODONE, oxyCODONE, potassium chloride, potassium chloride, potassium chloride, potassium chloride, sodium chloride 0.9%, sodium chloride 0.9%, sodium chloride 0.9%, sodium phosphate IVPB, sodium phosphate IVPB, sodium phosphate IVPB, sodium phosphate IVPB, sodium phosphate IVPB     Review of patient's allergies indicates:   Allergen Reactions    Ciprofloxacin Other (See Comments)     Elevated liver enzymes      Pcn [penicillins] Anaphylaxis     TOLERATES ROCEPHIN WITHOUT DIFFICULTY     Objective:     Vital Signs (Most Recent):  Temp: 98.4 °F (36.9 °C) (09/12/22 1716)  Pulse: 84 (09/12/22 1716)  Resp: 18 (09/12/22 1716)  BP: (!) 160/73 (09/12/22 1716)  SpO2: 97 % (09/12/22 1716)   Vital Signs (24h Range):  Temp:  [98  °F (36.7 °C)-99.1 °F (37.3 °C)] 98.4 °F (36.9 °C)  Pulse:  [65-85] 84  Resp:  [16-19] 18  SpO2:  [95 %-99 %] 97 %  BP: (103-161)/(47-73) 160/73     Weight: 70.9 kg (156 lb 3.2 oz)  Body mass index is 24.46 kg/m².    Intake/Output - Last 3 Shifts         09/10 0700 09/11 0659 09/11 0700 09/12 0659 09/12 0700 09/13 0659    P.O. 770 720     Blood 616      Other 500  500    IV Piggyback 50.1      Total Intake(mL/kg) 1936.1 (27.3) 720 (10.2) 500 (7.1)    Drains 150 680 20    Other 1800  2500    Stool 200  200    Total Output 2150 680 2720    Net -213.9 +40 -2220                   Physical Exam  Abdominal:      General: Abdomen is flat. There is no distension.      Palpations: Abdomen is soft.      Tenderness: There is no abdominal tenderness.      Comments: Stool from ostomy       Significant Labs:  I have reviewed all pertinent lab results within the past 24 hours.  CBC:   Recent Labs   Lab 09/12/22  0609   WBC 8.28   RBC 2.71*   HGB 9.1*   HCT 29.4*      *   MCH 33.6*   MCHC 31.0*     BMP:   Recent Labs   Lab 09/12/22  0609   GLU 74   *   K 3.4*      CO2 21*   BUN 30*   CREATININE 6.4*   CALCIUM 7.6*   MG 1.8       Significant Diagnostics:  I have reviewed all pertinent imaging results/findings within the past 24 hours.    Assessment/Plan:     * Ischemic colitis s/p laprotomy with ischemic sigmoid colon, sigmoid rectum stricture  Doing well  Diet as tolerated  Dc planning        Brigette Chavez MD  General Surgery  [facility]

## 2022-09-12 NOTE — CARE UPDATE
09/12/22 1332   Patient Assessment/Suction   Level of Consciousness (AVPU) alert   Respiratory Effort Normal;Unlabored   Expansion/Accessory Muscles/Retractions expansion symmetric   All Lung Fields Breath Sounds clear   Rhythm/Pattern, Respiratory unlabored   PRE-TX-O2   O2 Device (Oxygen Therapy) nasal cannula   $ Is the patient on Low Flow Oxygen? Yes   Flow (L/min) 2   SpO2 99 %   Pulse Oximetry Type Intermittent   $ Pulse Oximetry - Multiple Charge Pulse Oximetry - Multiple   Pulse 66   Resp 18   Education   $ Education 15 min;DME Oxygen   Respiratory Evaluation   $ Care Plan Tech Time 15 min

## 2022-09-13 NOTE — PLAN OF CARE
Problem: Nutrition Impaired (Sepsis/Septic Shock)  Goal: Optimal Nutrition Intake  Outcome: Ongoing, Progressing  Intervention: Promote and Optimize Nutrition Delivery  Flowsheets (Taken 9/13/2022 1722)  Nutrition Support Management:   other (see comments)   weight trending reviewed     Recommendations  1.) Continue renal diet with Nepro TID.   2.) Suggest Sevelamer 800mg tablets with meals for hyperphosphostemia.   3.) Update vitamin D.   If <30, add cholecalciferol 2000 units daily.   4.) Daily weights.     Goals: 1.) Pt to consume/tolerate >75% of meals and ONS  Nutrition Goal Status: progressing towards goal

## 2022-09-13 NOTE — PROGRESS NOTES
"[facility]  Adult Nutrition   Progress Note (Follow-Up)    SUMMARY     Recommendations  1.) Continue renal diet with Nepro TID.   2.) Suggest Sevelamer 800mg tablets with meals for hyperphosphostemia.   3.) Update vitamin D.   If <30, add cholecalciferol 2000 units daily.   4.) Daily weights.    Goals: 1.) Pt to consume/tolerate >75% of meals and ONS  Nutrition Goal Status: progressing towards goal    Dietitian Rounds Brief  Pt laying in bed reports feeling ok. Pt states fair appetite, consuming 50-75% of most meals. Pt states she consumes about 1-2 Nepro daily. LBM 9/13 via ostomy. No GI distress stated. Pt to be d/c tomorrow after HD.    Diet order:   Current Diet Order: renal   Oral Nutrition Supplement: Nepro TID      Evaluation of Received Nutrient/Fluid Intake  Oral Calories (kcal): 840  Oral Protein (gm): 38  Oral Fluid (mL): 344  Energy Calories Required: meeting needs  Protein Required: meeting needs  Fluid Required: meeting needs  Tolerance: tolerating     % Intake of Estimated Energy Needs: 75 - 100 %  % Meal Intake: 50 - 75 %      Intake/Output Summary (Last 24 hours) at 9/13/2022 1719  Last data filed at 9/13/2022 0600  Gross per 24 hour   Intake --   Output 490 ml   Net -490 ml        Anthropometrics  Temp: 98.1 °F (36.7 °C)  Height Method: Stated  Height: 5' 7" (170.2 cm)  Height (inches): 67 in  Weight Method: Bed Scale  Weight: 70.9 kg (156 lb 3.2 oz)  Weight (lb): 156.2 lb  Ideal Body Weight (IBW), Female: 135 lb  % Ideal Body Weight, Female (lb): 111.84 %  BMI (Calculated): 24.5  BMI Grade: 18.5-24.9 - normal       Estimated/Assessed Needs  Weight Used For Calorie Calculations: 70.9 kg (156 lb 4.9 oz)  Energy Calorie Requirements (kcal): 4731-6843 (25-30)  Energy Need Method: Kcal/kg  Protein Requirements:  (1.2-1.5 gm/kg)  Weight Used For Protein Calculations: 70.9 kg (156 lb 4.9 oz)  Fluid Requirements (mL): 24 hour UOP + 1000 mL     RDA Method (mL): 1773       Reason for " Assessment  Reason For Assessment: RD follow-up  Relevant Medical History: ESRD, HTN, hypothyroidism, anemia, vitamin D deficiency, vulvar cancer, severe malnutrition    Nutrition/Diet History  Spiritual, Cultural Beliefs, Taoist Practices, Values that Affect Care: no  Factors Affecting Nutritional Intake: None identified at this time    Nutrition Risk Screen  Nutrition Risk Screen: no indicators present     MST Score: 0  Have you recently lost weight without trying?: No  Weight loss score: 0  Have you been eating poorly because of a decreased appetite?: No  Appetite score: 0       Weight History:  Wt Readings from Last 5 Encounters:   09/08/22 70.9 kg (156 lb 3.2 oz)   08/30/22 69.4 kg (153 lb)   08/27/22 65.8 kg (145 lb)   08/22/22 67.6 kg (149 lb)   07/20/22 64.4 kg (142 lb)        Lab/Procedures/Meds: Pertinent Labs/Meds Reviewed    Medications:Pertinent Medications Reviewed  Scheduled Meds:   diltiaZEM  120 mg Oral Daily    epoetin nica-epbx  10,000 Units Subcutaneous Every Mon, Wed, Fri    gabapentin  100 mg Oral BID    levothyroxine  88 mcg Oral Before breakfast    LIDOcaine (PF) 10 mg/ml (1%)  1 mL Other Once    sertraline  50 mg Oral QHS     Continuous Infusions:  PRN Meds:.sodium chloride, acetaminophen, calcium chloride IVPB, calcium chloride IVPB, calcium chloride IVPB, diazePAM, hydrALAZINE, HYDROmorphone, magnesium oxide, magnesium sulfate IVPB, magnesium sulfate IVPB, magnesium sulfate IVPB, magnesium sulfate IVPB, ondansetron, oxyCODONE, oxyCODONE, potassium chloride, potassium chloride, potassium chloride, potassium chloride, sodium chloride 0.9%, sodium chloride 0.9%, sodium chloride 0.9%, sodium phosphate IVPB, sodium phosphate IVPB, sodium phosphate IVPB, sodium phosphate IVPB, sodium phosphate IVPB    Labs: Pertinent Labs Reviewed  Clinical Chemistry:  Recent Labs   Lab 09/07/22  1803 09/08/22  0014 09/10/22  0404 09/11/22  0447 09/12/22  0609 09/13/22  0549      < > 136  --  134*  --     K 2.9*   < > 3.4*  --  3.4*  --       < > 107  --  103  --    CO2 22*   < > 19*  --  21*  --    GLU 77   < > 124*  --  74  --    BUN 20   < > 42*  --  30*  --    CREATININE 5.4*   < > 7.2*  --  6.4*  --    CALCIUM 7.6*   < > 6.2*  --  7.6*  --    PROT 4.5*  --   --   --   --   --    ALBUMIN 2.1*  --  1.7*  --   --   --    BILITOT 0.9  --   --   --   --   --    ALKPHOS 97  --   --   --   --   --    AST 16  --   --   --   --   --    ALT 9*  --   --   --   --   --    ANIONGAP 12   < > 10  --  10  --    MG 1.4*   < > 1.9   < > 1.8 1.7   PHOS 5.4*  --   --   --   --   --     < > = values in this interval not displayed.     CBC:   Recent Labs   Lab 09/12/22  0609   WBC 8.28   RBC 2.71*   HGB 9.1*   HCT 29.4*      *   MCH 33.6*   MCHC 31.0*     Cardiac Profile:  Recent Labs   Lab 09/07/22  1010   TROPONINI 0.033         Monitor and Evaluation  Food and Nutrient Intake: energy intake, food and beverage intake  Food and Nutrient Adminstration: diet order  Knowledge/Beliefs/Attitudes: food and nutrition knowledge/skill  Physical Activity and Function: nutrition-related ADLs and IADLs  Anthropometric Measurements: weight, weight change, body mass index  Biochemical Data, Medical Tests and Procedures: electrolyte and renal panel, gastrointestinal profile, glucose/endocrine profile  Nutrition-Focused Physical Findings: overall appearance     Nutrition Risk  Level of Risk/Frequency of Follow-up: moderate     Nutrition Follow-Up  RD Follow-up?: Yes      Gaby Stoner, SHERIF 09/13/2022 5:19 PM

## 2022-09-13 NOTE — PROGRESS NOTES
[facility] Medicine  Progress Note    Patient Name: Shara Hutchins  MRN: [id number]  Patient Class: IP- Inpatient   Admission Date: 9/7/2022  Length of Stay: 6 days  Attending Physician: Aleksander Phan MD  Primary Care Provider: April Horton MD        Subjective:     Principal Problem:Ischemic colitis        HPI:  72 year old female with a history of ESRD on HD MWF, HTN, hypothyroidism, and chronic diarrhea s/p iliostomy and reversal 2 year back came with abdominal pain .  Started on Friday and complaining of , nausea, vomiting.  Initially she was having only diarrhea but later today she saw blood clots along with stool and came to the hospital.  She is on dialysis and she got dialysis yesterday. Patient has had decreased p.o. intake for the last few days almost not eating at all.  CT of the abdomen  pelvis  was done and found Portal venous air with pneumatosis within the descending colon and sigmoid colon with diffuse wall thickening of the colon. Findings are suggestive of ischemic colitis.  Patient was given IV fluid and IV potassium supplement in the ER.  She is having abdominal pain but not in chest pain or shortness of breath and she is awake alert oriented.  On examination patient abdomen is soft but tender at lower abdomen .  ED physician discussed with surgeon and they will bring her to OR.  Of note there is no free air.      Overview/Hospital Course:  Patient is seen and examined  She complained of pain at the operative site  Colostomy in place and no stool  BP stable without vasopressor support.    9/9  Still having pains. Colostomy some blood , no stool   BP stable . WBC better. May soon need PRBC    9/10  Sitting up   BP OK . Will do HD . Colostomy with some drainage appear liquid stool    9/11  Appear dehydrated . Large stool in the bag . BP stable. Eating well    9/12  Colostomy with stool . Getting HD now .  Increased in drain output . ++ noted . Hb stable    Hospital course    Admitted with a ischemic colitis and colectomy and ostomy was done .  No signs of infection and antibiotics discontinued.  On examination patient was walking with physical therapy.  Home health arranged.  Patient wants to go to [facility]  only  Nephrology wants to do hemodialysis and patient also requesting to get discharged tomorrow after dialysis      Interval History:     Review of Systems  As per subjective   Objective:     Vital Signs (Most Recent):  Temp: 98.2 °F (36.8 °C) (09/13/22 1143)  Pulse: 68 (09/13/22 1143)  Resp: 18 (09/13/22 1143)  BP: (!) 150/68 (09/13/22 1143)  SpO2: 98 % (09/13/22 1143)   Vital Signs (24h Range):  Temp:  [97.4 °F (36.3 °C)-98.7 °F (37.1 °C)] 98.2 °F (36.8 °C)  Pulse:  [68-88] 68  Resp:  [16-20] 18  SpO2:  [96 %-99 %] 98 %  BP: (114-160)/(49-73) 150/68     Weight: 70.9 kg (156 lb 3.2 oz)  Body mass index is 24.46 kg/m².    Intake/Output Summary (Last 24 hours) at 9/13/2022 1430  Last data filed at 9/13/2022 0600  Gross per 24 hour   Intake --   Output 490 ml   Net -490 ml      Physical Exam  Vitals and nursing note reviewed.   HENT:      Head: Normocephalic and atraumatic.   Eyes:      Conjunctiva/sclera: Conjunctivae normal.   Neck:      Vascular: No JVD.   Cardiovascular:      Heart sounds: Normal heart sounds.   Pulmonary:      Effort: Pulmonary effort is normal.   Abdominal:      Palpations: Abdomen is soft.   Skin:     General: Skin is warm.   Neurological:      Mental Status: She is alert and oriented to person, place, and time.   Psychiatric:         Mood and Affect: Mood normal.       Significant Labs: All pertinent labs within the past 24 hours have been reviewed.  CBC:   Recent Labs   Lab 09/12/22  0609   WBC 8.28   HGB 9.1*   HCT 29.4*        CMP:   Recent Labs   Lab 09/12/22  0609   *   K 3.4*      CO2 21*   GLU 74   BUN 30*   CREATININE 6.4*   CALCIUM 7.6*   ANIONGAP 10     Cardiac Markers: No results for input(s): CKMB, MYOGLOBIN, BNP,  GEE in the last 48 hours.    Significant Imaging: I have reviewed all pertinent imaging results/findings within the past 24 hours.      Assessment/Plan:      Active Hospital Problems    Diagnosis    *Ischemic colitis s/p laprotomy with ischemic sigmoid colon, sigmoid rectum stricture    ESRD on dialysis MWF    Severe dehydration due to chronic diarrhea/high output ileostomy and reversed    Emphysema lung    S/P ureteral stent placement    Essential hypertension    Status post reversal of ileostomy    Severe malnutrition    DARCI (acute kidney injury)    Hypothyroidism    Anemia of chronic disease    Anemia due to stage 5 chronic kidney disease treated with erythropoietin    Hisotry of Vulvar cancer        PLAN   descending colon and sigmoid colon was resected .   Post op care  And Ostomy care    DC  antibiotics   surgery cleared for DC with pelvis drain   Advance diet as tolerated   Home meds changed back to PO   Watch out pelvis drain output    [facility]  after HD       VTE Risk Mitigation (From admission, onward)         Ordered     IP VTE HIGH RISK PATIENT  Once         09/07/22 1822     Place sequential compression device  Until discontinued         09/07/22 1822     Place JA hose  Until discontinued         09/07/22 1822     Place sequential compression device  Until discontinued         09/07/22 1258                Discharge Planning   RODRIGO: 9/13/2022     Code Status: Full Code   Is the patient medically ready for discharge?:     Reason for patient still in hospital (select all that apply): Treatment  Discharge Plan A: Assisted Living                  Aleksander Phan MD  Department of Hospital Medicine   [facility]

## 2022-09-13 NOTE — PLAN OF CARE
Problem: Adult Inpatient Plan of Care  Goal: Plan of Care Review  Outcome: Ongoing, Progressing  Goal: Patient-Specific Goal (Individualized)  Outcome: Ongoing, Progressing  Goal: Absence of Hospital-Acquired Illness or Injury  Outcome: Ongoing, Progressing  Goal: Optimal Comfort and Wellbeing  Outcome: Ongoing, Progressing  Goal: Readiness for Transition of Care  Outcome: Ongoing, Progressing     Problem: Infection  Goal: Absence of Infection Signs and Symptoms  Outcome: Ongoing, Progressing     Problem: Impaired Wound Healing  Goal: Optimal Wound Healing  Outcome: Ongoing, Progressing     Problem: Adjustment to Illness (Sepsis/Septic Shock)  Goal: Optimal Coping  Outcome: Ongoing, Progressing     Problem: Bleeding (Sepsis/Septic Shock)  Goal: Absence of Bleeding  Outcome: Ongoing, Progressing     Problem: Glycemic Control Impaired (Sepsis/Septic Shock)  Goal: Blood Glucose Level Within Desired Range  Outcome: Ongoing, Progressing     Problem: Infection Progression (Sepsis/Septic Shock)  Goal: Absence of Infection Signs and Symptoms  Outcome: Ongoing, Progressing     Problem: Nutrition Impaired (Sepsis/Septic Shock)  Goal: Optimal Nutrition Intake  Outcome: Ongoing, Progressing     Problem: Fluid and Electrolyte Imbalance (Acute Kidney Injury/Impairment)  Goal: Fluid and Electrolyte Balance  Outcome: Ongoing, Progressing     Problem: Oral Intake Inadequate (Acute Kidney Injury/Impairment)  Goal: Optimal Nutrition Intake  Outcome: Ongoing, Progressing     Problem: Renal Function Impairment (Acute Kidney Injury/Impairment)  Goal: Effective Renal Function  Outcome: Ongoing, Progressing     Problem: Fluid Imbalance (Pneumonia)  Goal: Fluid Balance  Outcome: Ongoing, Progressing     Problem: Infection (Pneumonia)  Goal: Resolution of Infection Signs and Symptoms  Outcome: Ongoing, Progressing     Problem: Respiratory Compromise (Pneumonia)  Goal: Effective Oxygenation and Ventilation  Outcome: Ongoing, Progressing      Problem: Fall Injury Risk  Goal: Absence of Fall and Fall-Related Injury  Outcome: Ongoing, Progressing     Problem: Skin Injury Risk Increased  Goal: Skin Health and Integrity  Outcome: Ongoing, Progressing     Problem: Device-Related Complication Risk (Hemodialysis)  Goal: Safe, Effective Therapy Delivery  Outcome: Ongoing, Progressing     Problem: Hemodynamic Instability (Hemodialysis)  Goal: Effective Tissue Perfusion  Outcome: Ongoing, Progressing     Problem: Infection (Hemodialysis)  Goal: Absence of Infection Signs and Symptoms  Outcome: Ongoing, Progressing

## 2022-09-13 NOTE — RESPIRATORY THERAPY
09/12/22 5060   Patient Assessment/Suction   Respiratory Effort Normal;Unlabored   Expansion/Accessory Muscles/Retractions expansion symmetric   All Lung Fields Breath Sounds clear   PRE-TX-O2   O2 Device (Oxygen Therapy) nasal cannula   $ Is the patient on Low Flow Oxygen? Yes   Flow (L/min) 2   SpO2 97 %   Pulse Oximetry Type Intermittent   $ Pulse Oximetry - Multiple Charge Pulse Oximetry - Multiple   Education   $ Education DME Oxygen;15 min   Respiratory Evaluation   $ Care Plan Tech Time 15 min

## 2022-09-13 NOTE — NURSING
For possible discharge  voiced understanding for ostomy   Extra supplied to the room   RX on the chart for signature

## 2022-09-13 NOTE — PROGRESS NOTES
Nephrology Progress Note        Patient Name: Shara Hutchins  MRN: [id number]    Patient Class: IP- Inpatient   Admission Date: 9/7/2022  Length of Stay: 6 days  Date of Service: 9/13/2022    Attending Physician: Aleksander Phan MD  Primary Care Provider: April Horton MD    Reason for Consult: ESRD/ANEMIA/HTN/SHPT/ischemic colitis    SUBJECTIVE:     HPI: 72-year-old ESRD on HD MWF and history of multiple abdominal surgeries for ischemic colitis presents with bloody diarrhea and severe abdominal pain, a was diagnosed with ischemic colitis and is going for emergent surgery.    9/8  labs reviewed, K+ better.  No acute clearance needs today.  C/o post op pain, no other complaints.  Next HD tomorrow.  9/9 VSS. HD today. Started clears today.  9/10 VSS. HD deferred yesterday. Seen on HD today. Will add epoetin as well, getting 1 PRBC too.  9/11 VSS, no new complains. HD on Monday.  9/12 having difficulty with cannulation of AVF- ordered u/s  9/13 u/s normal- prefers to be discharged after HD tomorrow    Outpatient meds:  Current Facility-Administered Medications on File Prior to Encounter   Medication Dose Route Frequency Provider Last Rate Last Admin    electrolyte-S (ISOLYTE)   Intravenous Continuous Raz Ruff MD        lactated ringers infusion   Intravenous Continuous Ruy Beltre MD         Current Outpatient Medications on File Prior to Encounter   Medication Sig Dispense Refill    cholecalciferol, vitamin D3, (VITAMIN D3) 25 mcg (1,000 unit) capsule Take 1 capsule (1,000 Units total) by mouth once daily. 10 capsule 0    diltiaZEM (CARDIZEM CD) 120 MG Cp24 Take 1 capsule (120 mg total) by mouth once daily. 90 capsule 3    ferric citrate (AURYXIA) 210 mg iron Tab Take 2 tablets by mouth 3 (three) times daily with meals.      gabapentin (NEURONTIN) 100 MG capsule Take 100 mg by mouth 2 (two) times daily. HCS      hydrALAZINE (APRESOLINE) 50 MG tablet Take 1 tablet (50 mg total) by mouth every 8 (eight) hours.  270 tablet 0    levothyroxine (SYNTHROID) 88 MCG tablet TAKE 1 TABLET BEFORE BREAKFAST (Patient taking differently: Take 88 mcg by mouth before breakfast. TAKE 1 TABLET BEFORE BREAKFAST) 90 tablet 3    LORazepam (ATIVAN) 0.5 MG tablet Take 1 tablet (0.5 mg total) by mouth every evening. 90 tablet 1    promethazine (PHENERGAN) 25 MG tablet TAKE 1 TABLET (25 MG TOTAL) BY MOUTH 2 (TWO) TIMES DAILY AS NEEDED FOR NAUSEA. 60 tablet 0    rOPINIRole (REQUIP) 0.5 MG tablet Take 0.5 mg by mouth every 8 (eight) hours.      sertraline (ZOLOFT) 50 MG tablet Take 1 tablet (50 mg total) by mouth every evening. Take 50 mg by mouth every evening. 90 tablet 1    traMADoL (ULTRAM) 50 mg tablet Take 1 tablet (50 mg total) by mouth every 6 (six) hours as needed. 30 tablet 0    zinc gluconate 50 mg tablet Take 50 mg by mouth once daily.      [DISCONTINUED] miconazole (MICOTIN) 2 % cream Apply topically 2 (two) times daily. 28 g 0       Scheduled meds:   diltiaZEM  120 mg Oral Daily    epoetin nica-epbx  10,000 Units Subcutaneous Every Mon, Wed, Fri    gabapentin  100 mg Oral BID    levothyroxine  88 mcg Oral Before breakfast    LIDOcaine (PF) 10 mg/ml (1%)  1 mL Other Once    meropenem (MERREM) IVPB  500 mg Intravenous Q24H    sertraline  50 mg Oral QHS       Infusions:        PRN meds:  sodium chloride, acetaminophen, calcium chloride IVPB, calcium chloride IVPB, calcium chloride IVPB, diazePAM, hydrALAZINE, HYDROmorphone, magnesium oxide, magnesium sulfate IVPB, magnesium sulfate IVPB, magnesium sulfate IVPB, magnesium sulfate IVPB, ondansetron, oxyCODONE, oxyCODONE, potassium chloride, potassium chloride, potassium chloride, potassium chloride, sodium chloride 0.9%, sodium chloride 0.9%, sodium chloride 0.9%, sodium phosphate IVPB, sodium phosphate IVPB, sodium phosphate IVPB, sodium phosphate IVPB, sodium phosphate IVPB    Review of Systems:  Neg    OBJECTIVE:     Vital Signs and IO (Last 24H):  Temp:  [97.4 °F (36.3 °C)-98.7 °F  (37.1 °C)]   Pulse:  [68-88]   Resp:  [16-20]   BP: (114-160)/(49-73)   SpO2:  [96 %-99 %]   I/O last 3 completed shifts:  In: 500 [Other:500]  Out: 3460 [Drains:610; Other:2500; Stool:350]    Wt Readings from Last 5 Encounters:   09/08/22 70.9 kg (156 lb 3.2 oz)   08/30/22 69.4 kg (153 lb)   08/27/22 65.8 kg (145 lb)   08/22/22 67.6 kg (149 lb)   07/20/22 64.4 kg (142 lb)     Physical Exam:  Constitutional: nad, aao x 3  Heart: rrr, no m/r/g, wwp, no edema  Lungs: ctab, no w/r/r/c, no lb  Abdomen: s/nt/nd, +BS      Body mass index is 24.46 kg/m².    Laboratory:  Recent Labs   Lab 09/09/22  0354 09/10/22  0404 09/12/22  0609    136 134*   K 4.0 3.4* 3.4*    107 103   CO2 18* 19* 21*   BUN 38* 42* 30*   CREATININE 7.0* 7.2* 6.4*   GLU 52* 124* 74         Recent Labs   Lab 09/07/22  1010 09/07/22  1803 09/08/22  0605 09/09/22  0354 09/10/22  0404 09/11/22  0447 09/12/22  0609 09/13/22  0549   CALCIUM 7.8* 7.6*   < > 7.4* 6.2*  --  7.6*  --    ALBUMIN 2.7* 2.1*  --   --  1.7*  --   --   --    PHOS  --  5.4*  --   --   --   --   --   --    MG 1.3* 1.4*  --  2.2  2.2 1.9 1.9 1.8 1.7    < > = values in this interval not displayed.         Recent Labs   Lab 09/30/19  0820 11/15/19  0748   PTH, Intact 176.4 H 238.4 H         No results for input(s): POCTGLUCOSE in the last 168 hours.    Recent Labs   Lab 11/15/19  0748 01/22/20  0848 05/25/21  0847   Hemoglobin A1C Invalid A SEE COMMENT 4.6         Recent Labs   Lab 09/09/22  0354 09/10/22  0404 09/11/22  0807 09/12/22  0609   WBC 14.83* 12.42 8.79 8.28   HGB 8.0* 7.4* 9.8* 9.1*   HCT 25.1* 24.0* 31.7* 29.4*    143* 200 195   * 113* 110* 109*   MCHC 31.9* 30.8* 30.9* 31.0*   MONO 5.8  0.9 5.1  0.6  --   --          Recent Labs   Lab 09/07/22  1010 09/07/22  1803 09/10/22  0404   BILITOT 0.8 0.9  --    PROT 6.0 4.5*  --    ALBUMIN 2.7* 2.1* 1.7*   ALKPHOS 135 97  --    ALT 12 9*  --    AST 16 16  --          Recent Labs   Lab 04/26/22  1706  06/21/22  1306 06/22/22  1550   Color, UA Yellow Yellow Yellow   Appearance, UA Hazy A Cloudy A Cloudy A   pH, UA 7.0 7.0 7.0   Specific [place], UA 1.010 1.010 1.010   Protein, UA 1+ A 2+ A 2+ A   Glucose, UA Trace A Negative Negative   Ketones, UA Negative Negative Negative   Urobilinogen, UA Negative Negative Negative   Bilirubin (UA) Negative Negative Negative   Occult Blood UA 3+ A 2+ A 3+ A   Nitrite, UA Negative Negative Negative   RBC, UA >100 H 22 H >100 H   WBC, UA >100 H >100 H >100 H   Bacteria Negative Many A Negative   Hyaline Casts, UA 8 A 10 A 444 A         Recent Labs   Lab 12/08/20 2024 06/22/22  1526 09/07/22  1022 09/07/22  1643   POC PH 7.370 7.324 L  --  7.380   POC PCO2 39.0 53.8 H  --  41.4   POC HCO3 22.5 L 28.0  --  24.5   POC PO2 85 40  --  64 HH   POC SATURATED O2 96 69 L  --  92 L   POC BE -3 2  --  -1   Sample ARTERIAL VENOUS VENOUS VENOUS         Microbiology Results (last 7 days)       Procedure Component Value Units Date/Time    Blood culture [[id number]] Collected: 09/07/22 0945    Order Status: Completed Specimen: Blood from Peripheral, Forearm, Right Updated: 09/12/22 1232     Blood Culture, Routine No growth after 5 days.    Blood culture [[id number]] Collected: 09/07/22 1009    Order Status: Completed Specimen: Blood from Peripheral, Ankle, Right Updated: 09/12/22 1232     Blood Culture, Routine No growth after 5 days.          ASSESSMENT/PLAN:     ESRD on HD MWF via AVF  US ok- will just make sure needle placement is good  HD tomorrow    Anemia of CKD  Transfused this admission  Continue ADAM with HD    MBD / Secondary HPT  Renal diet    HTN  Controlled    Ischemic colitis  S/p surgery on 9/8 by Dr. Chavez    Thank you for allowing us to participate in the care of your patient!   We will follow the patient and provide recommendations as needed.    Patient care time was spent personally by me on the following activities: > 35 min  Obtaining a history.  Examination of  patient.  Providing medical care at the patients bedside.  Developing a treatment plan with patient or surrogate and bedside caregivers.  Ordering and reviewing laboratory studies, radiographic studies, pulse oximetry.  Ordering and performing treatments and interventions.  Evaluation of patient's response to treatment.  Discussions with consultants while on the unit and immediately available to the patient.  Re-evaluation of the patient's condition.  Documentation in the medical record.     Yasmin Parker MD      [facility]  [address] [place] [id number]    [phone] - tel  [phone] - fax    9/13/2022

## 2022-09-13 NOTE — PROGRESS NOTES
Progress Note  Gen Surg    Admit Date: 9/7/2022  Attending: Scott  S/P: Procedure(s) (LRB):  LAPAROTOMY, EXPLORATORY (N/A)  COLON RESECTION  OSTEOTOMY    Post-operative Day: 6 Days Post-Op    Hospital Day: 7    SUBJECTIVE:     Tolerating diet  Having stools     OBJECTIVE:     Vital Signs (Most Recent)  Temp: 98.2 °F (36.8 °C) (09/13/22 1143)  Pulse: 68 (09/13/22 1143)  Resp: 18 (09/13/22 1143)  BP: (!) 150/68 (09/13/22 1143)  SpO2: 98 % (09/13/22 1143)    Vital Signs Range (Last 24H):  Temp:  [97.4 °F (36.3 °C)-98.7 °F (37.1 °C)]   Pulse:  [65-88]   Resp:  [16-20]   BP: (114-160)/(49-73)   SpO2:  [96 %-99 %]     I & O (Last 24H):  Intake/Output Summary (Last 24 hours) at 9/13/2022 1230  Last data filed at 9/13/2022 0600  Gross per 24 hour   Intake 500 ml   Output 2990 ml   Net -2490 ml       Scheduled medications:   diltiaZEM  120 mg Oral Daily    epoetin nica-epbx  10,000 Units Subcutaneous Every Mon, Wed, Fri    gabapentin  100 mg Oral BID    levothyroxine  88 mcg Oral Before breakfast    LIDOcaine (PF) 10 mg/ml (1%)  1 mL Other Once    meropenem (MERREM) IVPB  500 mg Intravenous Q24H    sertraline  50 mg Oral QHS       Physical Exam:  General: no distress  Lungs:  clear to auscultation bilaterally and normal respiratory effort  Heart: regular rate and rhythm, S1, S2 normal, no murmur, rub or gallop  Abdomen: soft, non-tender non-distented; bowel sounds normal; no masses,  no organomegaly    Wound/Incision:  clean, dry, intact    Laboratory:  Labs within the past 24 hours have been reviewed.    ASSESSMENT/PLAN:     Drain ss  Ok to dc with drain  Follow up next week to remove      Brigette Chavez MD

## 2022-09-13 NOTE — PT/OT/SLP EVAL
Physical Therapy Evaluation    Patient Name:  Shara Hutchins   MRN:  [id number]    Recommendations:     Discharge Recommendations:  home health PT   Discharge Equipment Recommendations: walker, rolling   Barriers to discharge:  lives in assisted living    Assessment:     Shara Hutchins is a 72 y.o. female admitted with a medical diagnosis of Ischemic colitis.  She presents with the following impairments/functional limitations:  weakness, impaired endurance, impaired self care skills, impaired functional mobility, gait instability, impaired balance, decreased lower extremity function, impaired cardiopulmonary response to activity, edema.     Rehab Prognosis: Good; patient would benefit from acute skilled PT services to address these deficits and reach maximum level of function.    Recent Surgery: Procedure(s) (LRB):  LAPAROTOMY, EXPLORATORY (N/A)  COLON RESECTION  OSTEOTOMY 6 Days Post-Op    Plan:     During this hospitalization, patient to be seen 6 x/week to address the identified rehab impairments via gait training, therapeutic activities, therapeutic exercises and progress toward the following goals:    Plan of Care Expires:  9/30/22     Subjective     Chief Complaint: Pt states she sat up yesterday but later in the session stated she has not been in a chair  Patient/Family Comments/goals: get better  Pain/Comfort:  Pain Rating 1: 0/10    Patients cultural, spiritual, Jewish conflicts given the current situation: no    Living Environment:  Pt is a resident at an assisted living. States she sleeps in a recliner and ambulates without an AD at baseline  Prior to admission, patients level of function was mod I.  Equipment used at home: none.  DME owned (not currently used): rolling walker.  Upon discharge, patient will have assistance from staff at facility.    Objective:     Communicated with nurse prior to session.  Patient found supine with oxygen, bed alarm, nephrostomy  upon PT entry to room.    General  Precautions: Standard, fall, contact   Orthopedic Precautions:N/A   Braces: N/A  Respiratory Status: Nasal cannula, flow 2 L/min    Exams:  Cognitive Exam:  Patient is oriented to Person, Place, Time, Situation, and mild confusion with events in the hospital  Sensation:    -       Intact  RLE Strength: 3/5 throughout  LLE Strength: 3/5 throughout    Functional Mobility:  Bed Mobility:     Rolling Left:  moderate assistance  Supine to Sit: maximal assistance  Transfers:     Sit to Stand:  minimum assistance with rolling walker  Bed to Chair: minimum assistance with  rolling walker  using  Step Transfer  Gait: 100 ft with rw . 94% on RA at rest. 90% on RA with activity    Therapeutic Activities and Exercises:   Performed bed mobility and gait with rw. Maintained O2 on RA while ambulating. Mild VC for safety     AM-PAC 6 CLICK MOBILITY  Total Score:13     Patient left up in chair with all lines intact, call button in reach, chair alarm on, and nurse notified.    GOALS:   Multidisciplinary Problems       Physical Therapy Goals          Problem: Physical Therapy    Goal Priority Disciplines Outcome Goal Variances Interventions   Physical Therapy Goal     PT, PT/OT      Description: Goals to be met by: 22     Patient will increase functional independence with mobility by performin. Supine to sit with MInimal Assistance  2. Sit to stand transfer with Contact Guard Assistance  3. Bed to chair transfer with Contact Guard Assistance using Rolling Walker  4. Gait  x 150 feet with Contact Guard Assistance using Rolling Walker.                          History:     Past Medical History:   Diagnosis Date    Cellulitis of trunk     Perineum, numerous episodes    Chronic diarrhea 02/10/2020    Short-gut syndrome    End stage renal disease on dialysis 2018    Fatigue     Hypertension     Hypothyroidism 02/10/2020    Iron deficiency anemia due to chronic blood loss 2018    Pulmonary hypertension 2020     Vulvar cancer 01/07/2019       Past Surgical History:   Procedure Laterality Date    ABDOMINAL SURGERY      ANGIOGRAPHY OF ARTERIOVENOUS SHUNT N/A 01/19/2022    Procedure: Fistulogram with Possible Intervention;  Surgeon: Raz Maher MD;  Location: [facility] CATH/EP LAB;  Service: Cardiothoracic;  Laterality: N/A;    APPENDECTOMY      AV FISTULA PLACEMENT Left 2018    BACK SURGERY      BLADDER FULGURATION N/A 06/16/2020    Procedure: FULGURATION, BLADDER;  Surgeon: Damari Barber Jr., MD;  Location: [facility] OR;  Service: Urology;  Laterality: N/A;    carpel tunnel surgery once on each hand      CERVICAL FUSION      x 4    cervix repair      CHOLECYSTECTOMY  2000    COLECTOMY      CYSTOSCOPY W/ RETROGRADES  06/16/2020    Procedure: CYSTOSCOPY, WITH RETROGRADE PYELOGRAM;  Surgeon: Damari Barber Jr., MD;  Location: [facility] OR;  Service: Urology;;    CYSTOSCOPY W/ RETROGRADES Right 04/20/2021    Procedure: CYSTOSCOPY, WITH RETROGRADE PYELOGRAM;  Surgeon: Damari Barber Jr., MD;  Location: [facility] OR;  Service: Urology;  Laterality: Right;    CYSTOSCOPY W/ URETERAL STENT PLACEMENT Right 03/16/2021    Procedure: CYSTOSCOPY, WITH URETERAL STENT INSERTION;  Surgeon: Damari Barber Jr., MD;  Location: [facility];  Service: Urology;  Laterality: Right;    CYSTOSCOPY W/ URETERAL STENT PLACEMENT Right 05/18/2021    Procedure: CYSTOSCOPY, WITH URETERAL STENT INSERTION;  Surgeon: Damari Barber Jr., MD;  Location: [facility] OR;  Service: Urology;  Laterality: Right;    CYSTOSCOPY W/ URETERAL STENT PLACEMENT Right 06/22/2022    Procedure: CYSTOSCOPY, WITH URETERAL STENT INSERTION;  Surgeon: Gaby Garcia MD;  Location: [facility] OR;  Service: Urology;  Laterality: Right;  cysto stent exchange    CYSTOSCOPY W/ URETERAL STENT REMOVAL Right 04/20/2021    Procedure: CYSTOSCOPY, WITH URETERAL STENT REMOVAL;  Surgeon: Damari Barber Jr., MD;  Location: [facility] OR;  Service: Urology;  Laterality: Right;    CYSTOSCOPY WITH URETEROSCOPY, RETROGRADE  PYELOGRAPHY, AND INSERTION OF STENT Right 03/30/2021    Procedure: CYSTOSCOPY, WITH RETROGRADE PYELOGRAM AND URETERAL STENT INSERTION;  Surgeon: Damari Barber Jr., MD;  Location: [facility] OR;  Service: Urology;  Laterality: Right;    CYSTOURETEROSCOPY WITH RETROGRADE PYELOGRAPHY AND INSERTION OF STENT INTO URETER Right 01/18/2022    Procedure: CYSTOURETEROSCOPY, WITH RETROGRADE PYELOGRAM AND URETERAL STENT INSERTION;  Surgeon: Damari Barber Jr., MD;  Location: [facility] OR;  Service: Urology;  Laterality: Right;    CYSTOURETEROSCOPY WITH RETROGRADE PYELOGRAPHY AND INSERTION OF STENT INTO URETER Right 8/30/2022    Procedure: CYSTOURETEROSCOPY, WITH RETROGRADE PYELOGRAM AND URETERAL STENT INSERTION;  Surgeon: Damari Barber Jr., MD;  Location: [facility] OR;  Service: Urology;  Laterality: Right;    DIAGNOSTIC LAPAROSCOPY N/A 02/13/2020    Procedure: LAPAROSCOPY, DIAGNOSTIC;  Surgeon: Robbi Lovell III, MD;  Location: [facility] OR;  Service: General;  Laterality: N/A;    HYSTERECTOMY  1980    ILEOSTOMY N/A 02/13/2020    Procedure: CREATION, ILEOSTOMY Loop;  Surgeon: Robbi Lovell III, MD;  Location: [facility] OR;  Service: General;  Laterality: N/A;    ILEOSTOMY CLOSURE N/A 05/18/2020    Procedure: CLOSURE, ILEOSTOMY;  Surgeon: Robbi Lovell III, MD;  Location: [facility] OR;  Service: General;  Laterality: N/A;    LASER LITHOTRIPSY Right 03/30/2021    Procedure: LITHOTRIPSY, USING LASER;  Surgeon: Damari Barber Jr., MD;  Location: [facility] OR;  Service: Urology;  Laterality: Right;    LUMBAR LAMINECTOMY      LYSIS OF ADHESIONS N/A 02/13/2020    Procedure: LYSIS, ADHESIONS;  Surgeon: Robbi Lovell III, MD;  Location: [facility] OR;  Service: General;  Laterality: N/A;    NECK SURGERY      OSTEOTOMY  9/7/2022    Procedure: OSTEOTOMY;  Surgeon: Brigette Chavez MD;  Location: [facility] OR;  Service: General;;    PERCUTANEOUS TRANSLUMINAL ANGIOPLASTY OF ARTERIOVENOUS FISTULA N/A 01/19/2022    Procedure: PTA, AV FISTULA;  Surgeon: Raz GRAHAM  MD Gunnar;  Location: [facility] CATH/EP LAB;  Service: Cardiothoracic;  Laterality: N/A;    PHLEBOGRAPHY  02/28/2020    Procedure: VENOGRAM;  Surgeon: Robbi Lovell III, MD;  Location: [facility] OR;  Service: General;;    REMOVAL OF VASCULAR ACCESS PORT Right 02/13/2020    Procedure: REMOVAL, VASCULAR ACCESS PORT;  Surgeon: Robbi Lovell III, MD;  Location: [facility] OR;  Service: General;  Laterality: Right;    RETROGRADE PYELOGRAPHY  03/16/2021    Procedure: PYELOGRAM, RETROGRADE;  Surgeon: Damari Barber Jr., MD;  Location: [facility] OR;  Service: Urology;;    RETROGRADE PYELOGRAPHY Right 05/18/2021    Procedure: PYELOGRAM, RETROGRADE;  Surgeon: Damari Barber Jr., MD;  Location: [facility];  Service: Urology;  Laterality: Right;    RETROGRADE PYELOGRAPHY  06/22/2022    Procedure: PYELOGRAM, RETROGRADE;  Surgeon: Gaby Garcia MD;  Location: [facility];  Service: Urology;;    SHOULDER SURGERY      TONSILLECTOMY      URETEROSCOPIC REMOVAL OF URETERIC CALCULUS Right 03/30/2021    Procedure: REMOVAL, CALCULUS, URETER, URETEROSCOPIC;  Surgeon: Damari Barber Jr., MD;  Location: [facility];  Service: Urology;  Laterality: Right;    URETEROSCOPIC REMOVAL OF URETERIC CALCULUS Right 04/20/2021    Procedure: REMOVAL, CALCULUS, URETER, URETEROSCOPIC;  Surgeon: Damari Barber Jr., MD;  Location: [facility];  Service: Urology;  Laterality: Right;    URETEROSCOPIC REMOVAL OF URETERIC CALCULUS Right 8/30/2022    Procedure: REMOVAL, CALCULUS, URETER, URETEROSCOPIC;  Surgeon: Damari Barber Jr., MD;  Location: [facility];  Service: Urology;  Laterality: Right;    vulva cancer         Time Tracking:     PT Received On: 09/13/22  PT Start Time: 0955     PT Stop Time: 1020  PT Total Time (min): 25 min     Billable Minutes: Evaluation 10 min and Gait Training 15 min      09/13/2022

## 2022-09-13 NOTE — SUBJECTIVE & OBJECTIVE
Interval History:     Review of Systems  As per subjective   Objective:     Vital Signs (Most Recent):  Temp: 98.2 °F (36.8 °C) (09/13/22 1143)  Pulse: 68 (09/13/22 1143)  Resp: 18 (09/13/22 1143)  BP: (!) 150/68 (09/13/22 1143)  SpO2: 98 % (09/13/22 1143)   Vital Signs (24h Range):  Temp:  [97.4 °F (36.3 °C)-98.7 °F (37.1 °C)] 98.2 °F (36.8 °C)  Pulse:  [68-88] 68  Resp:  [16-20] 18  SpO2:  [96 %-99 %] 98 %  BP: (114-160)/(49-73) 150/68     Weight: 70.9 kg (156 lb 3.2 oz)  Body mass index is 24.46 kg/m².    Intake/Output Summary (Last 24 hours) at 9/13/2022 1430  Last data filed at 9/13/2022 0600  Gross per 24 hour   Intake --   Output 490 ml   Net -490 ml      Physical Exam  Vitals and nursing note reviewed.   HENT:      Head: Normocephalic and atraumatic.   Eyes:      Conjunctiva/sclera: Conjunctivae normal.   Neck:      Vascular: No JVD.   Cardiovascular:      Heart sounds: Normal heart sounds.   Pulmonary:      Effort: Pulmonary effort is normal.   Abdominal:      Palpations: Abdomen is soft.   Skin:     General: Skin is warm.   Neurological:      Mental Status: She is alert and oriented to person, place, and time.   Psychiatric:         Mood and Affect: Mood normal.       Significant Labs: All pertinent labs within the past 24 hours have been reviewed.  CBC:   Recent Labs   Lab 09/12/22  0609   WBC 8.28   HGB 9.1*   HCT 29.4*        CMP:   Recent Labs   Lab 09/12/22  0609   *   K 3.4*      CO2 21*   GLU 74   BUN 30*   CREATININE 6.4*   CALCIUM 7.6*   ANIONGAP 10     Cardiac Markers: No results for input(s): CKMB, MYOGLOBIN, BNP, TROPISTAT in the last 48 hours.    Significant Imaging: I have reviewed all pertinent imaging results/findings within the past 24 hours.

## 2022-09-13 NOTE — PLAN OF CARE
[facility]  Discharge Final Note    Primary Care Provider: April Horton MD    Expected Discharge Date: 9/14/2022    MD discuss this Pt with . Pt to discharge to [facility] after dialysis tomorrow 9/14.  to follow.    Final Discharge Note (most recent)       Final Note - 09/13/22 1632          Final Note    Assessment Type Discharge Planning Reassessment        Post-Acute Status    Discharge Delays None known at this time                     Important Message from Medicare  Important Message from Medicare regarding Discharge Appeal Rights: Given to patient/caregiver, Explained to patient/caregiver, Signed/date by patient/caregiver     Date IMM was signed: 09/13/22  Time IMM was signed: 3804    Contact Info       Brigette Chavez MD   Specialty: General Surgery, Bariatrics, Surgery    [address]  [address]  [place] [address]   Phone: [phone]       Next Steps: Follow up in 1 week(s)    Yasmin Parker MD   Specialty: Nephrology    [address] [facility] [address]  [place] [place] [address]   Phone: [phone]       Next Steps: Follow up in 1 week(s)

## 2022-09-14 NOTE — CARE UPDATE
09/14/22 0821   PRE-TX-O2   O2 Device (Oxygen Therapy) nasal cannula   $ Is the patient on Low Flow Oxygen? Yes   Flow (L/min) 2   SpO2 96 %   Pulse Oximetry Type Intermittent   $ Pulse Oximetry - Multiple Charge Pulse Oximetry - Multiple   Pulse 88   Resp 17   Respiratory Evaluation   $ Care Plan Tech Time 15 min

## 2022-09-14 NOTE — PT/OT/SLP PROGRESS
Physical Therapy      Patient Name:  Shara Hutchins   MRN:  [id number]    Patient not seen today secondary to Dialysis., A x 2 Will follow-up tomorrow.

## 2022-09-14 NOTE — PROGRESS NOTES
HD tx tolerated well  Net UF 2 liters  Pt educated regarding access maintenance       09/14/22 1315   Handoff Report   Received From Kaylee Lu   Given To Tramaine   Vital Signs   Pulse 79   Resp 16   SpO2 98 %   /68   Assessments (Pre/Post)   Blood Liters Processed (BLP) 51   Transport Modality not applicable   Level of Consciousness (AVPU) alert   Dialyzer Clearance moderately streaked        Hemodialysis AV Fistula Left upper arm   No Placement Date or Time found.   Present Prior to Hospital Arrival?: Yes  Location: Left upper arm   Patency Present;Thrill;Bruit   Status Deaccessed   Flows Good   Dressing Intervention First dressing   Dressing Status Clean;Dry;Intact   Site Condition No complications   Dressing Gauze   Post-Hemodialysis Assessment   Rinseback Volume (mL) 250 mL   Blood Volume Processed (Liters) 51 L   Dialyzer Clearance Moderately streaked   Duration of Treatment 180 minutes   Additional Fluid Intake (mL) 500 mL   Total UF (mL) 2500 mL   Net Fluid Removal 2000   Patient Response to Treatment tolerated well   Post-Treatment Weight 68.7 kg (151 lb 7.3 oz)   Treatment Weight Change -2   Arterial bleeding stop time (min) 6 min   Venous bleeding stop time (min) 6 min   Post-Hemodialysis Comments Tx complete, all blood returned per protocol, no issues

## 2022-09-14 NOTE — PLAN OF CARE
[facility]  Discharge Final Note    Primary Care Provider: April Horton MD    Expected Discharge Date: 9/14/2022     met with Pt at bedside to confirm discharge plans. Pt verbalized plan to return to [facility] via facility transport. Pt verbalized plan to have home health as well.  inquired about Pt's preference for home health. Pt verbalized she has had home health through [facility].  placed referral to [facility] home health via careport.  emailed case  to aid in scheduling Pt's follow-up appointment. Gilberto ([facility], [phone]) to inform  Pt would be required to submit a health benefits update form and see a [facility] PCP to continue receiving benefits. Pt has no other needs to be addressed by case management. Pt cleared to discharge by case management.    Final Discharge Note (most recent)       Final Note - 09/14/22 1420          Final Note    Assessment Type Final Discharge Note     Anticipated Discharge Disposition Home or Self Care     Hospital Resources/Appts/Education Provided Provided patient/caregiver with written discharge plan information;Appointments scheduled by Navigator/Coordinator        Post-Acute Status    Post-Acute Authorization Home Health     Home Health Status Discharge Plan Changed     Discharge Delays None known at this time                     Important Message from Medicare  Important Message from Medicare regarding Discharge Appeal Rights: Given to patient/caregiver, Explained to patient/caregiver, Signed/date by patient/caregiver     Date IMM was signed: 09/13/22  Time IMM was signed: 1423    Contact Info       Brigette Chavez MD   Specialty: General Surgery, Bariatrics, Surgery    [address]  [address]  [facility] [address]   Phone: [phone]       Next Steps: Follow up in 1 week(s)    Yasmin Parker MD   Specialty: Nephrology    [address]  [address]  [place] [address]   Phone: [phone]       Next Steps: Follow up in 1 week(s)    April Horton MD   Specialty: Internal Medicine   Relationship: PCP - General    [facility] [address] [facility]  [address]  [place] [address]   Phone: [phone]       Next Steps: Follow up

## 2022-09-14 NOTE — PROGRESS NOTES
Nephrology Progress Note        Patient Name: Shara Hutchins  MRN: [id number]    Patient Class: IP- Inpatient   Admission Date: 9/7/2022  Length of Stay: 7 days  Date of Service: 9/14/2022    Attending Physician: Dalton Hammond MD  Primary Care Provider: April Horton MD    Reason for Consult: ESRD/ANEMIA/HTN/SHPT/ischemic colitis    SUBJECTIVE:     HPI: 72-year-old ESRD on HD MWF and history of multiple abdominal surgeries for ischemic colitis presents with bloody diarrhea and severe abdominal pain, a was diagnosed with ischemic colitis and is going for emergent surgery.    9/8  labs reviewed, K+ better.  No acute clearance needs today.  C/o post op pain, no other complaints.  Next HD tomorrow.  9/9 VSS. HD today. Started clears today.  9/10 VSS. HD deferred yesterday. Seen on HD today. Will add epoetin as well, getting 1 PRBC too.  9/11 VSS, no new complains. HD on Monday.  9/12 having difficulty with cannulation of AVF- ordered u/s  9/13 u/s normal- prefers to be discharged after HD tomorrow  9/14 seen on HD- no issues    Outpatient meds:  Current Facility-Administered Medications on File Prior to Encounter   Medication Dose Route Frequency Provider Last Rate Last Admin    electrolyte-S (ISOLYTE)   Intravenous Continuous Raz Ruff MD        lactated ringers infusion   Intravenous Continuous Ruy Beltre MD         Current Outpatient Medications on File Prior to Encounter   Medication Sig Dispense Refill    cholecalciferol, vitamin D3, (VITAMIN D3) 25 mcg (1,000 unit) capsule Take 1 capsule (1,000 Units total) by mouth once daily. 10 capsule 0    diltiaZEM (CARDIZEM CD) 120 MG Cp24 Take 1 capsule (120 mg total) by mouth once daily. 90 capsule 3    ferric citrate (AURYXIA) 210 mg iron Tab Take 2 tablets by mouth 3 (three) times daily with meals.      gabapentin (NEURONTIN) 100 MG capsule Take 100 mg by mouth 2 (two) times daily. HCS      hydrALAZINE (APRESOLINE) 50 MG tablet Take 1 tablet (50 mg  total) by mouth every 8 (eight) hours. 270 tablet 0    levothyroxine (SYNTHROID) 88 MCG tablet TAKE 1 TABLET BEFORE BREAKFAST (Patient taking differently: Take 88 mcg by mouth before breakfast. TAKE 1 TABLET BEFORE BREAKFAST) 90 tablet 3    LORazepam (ATIVAN) 0.5 MG tablet Take 1 tablet (0.5 mg total) by mouth every evening. 90 tablet 1    promethazine (PHENERGAN) 25 MG tablet TAKE 1 TABLET (25 MG TOTAL) BY MOUTH 2 (TWO) TIMES DAILY AS NEEDED FOR NAUSEA. 60 tablet 0    rOPINIRole (REQUIP) 0.5 MG tablet Take 0.5 mg by mouth every 8 (eight) hours.      sertraline (ZOLOFT) 50 MG tablet Take 1 tablet (50 mg total) by mouth every evening. Take 50 mg by mouth every evening. 90 tablet 1    traMADoL (ULTRAM) 50 mg tablet Take 1 tablet (50 mg total) by mouth every 6 (six) hours as needed. 30 tablet 0    zinc gluconate 50 mg tablet Take 50 mg by mouth once daily.      [DISCONTINUED] miconazole (MICOTIN) 2 % cream Apply topically 2 (two) times daily. 28 g 0       Scheduled meds:   diltiaZEM  120 mg Oral Daily    epoetin nica-epbx  10,000 Units Subcutaneous Every Mon, Wed, Fri    gabapentin  100 mg Oral BID    levothyroxine  88 mcg Oral Before breakfast    LIDOcaine (PF) 10 mg/ml (1%)  1 mL Other Once    sertraline  50 mg Oral QHS       Infusions:        PRN meds:  sodium chloride, acetaminophen, calcium chloride IVPB, calcium chloride IVPB, calcium chloride IVPB, diazePAM, hydrALAZINE, HYDROmorphone, magnesium oxide, magnesium sulfate IVPB, magnesium sulfate IVPB, magnesium sulfate IVPB, magnesium sulfate IVPB, ondansetron, oxyCODONE, oxyCODONE, potassium chloride, potassium chloride, potassium chloride, potassium chloride, sodium chloride 0.9%, sodium chloride 0.9%, sodium chloride 0.9%, sodium phosphate IVPB, sodium phosphate IVPB, sodium phosphate IVPB, sodium phosphate IVPB, sodium phosphate IVPB    Review of Systems:  Neg    OBJECTIVE:     Vital Signs and IO (Last 24H):  Temp:  [98.1 °F (36.7 °C)-98.9 °F (37.2 °C)]    Pulse:  [68-91]   Resp:  [16-20]   BP: (101-150)/(55-68)   SpO2:  [95 %-98 %]   I/O last 3 completed shifts:  In: 240 [P.O.:240]  Out: 3900 [Drains:700; Stool:3200]    Wt Readings from Last 5 Encounters:   09/08/22 70.9 kg (156 lb 3.2 oz)   08/30/22 69.4 kg (153 lb)   08/27/22 65.8 kg (145 lb)   08/22/22 67.6 kg (149 lb)   07/20/22 64.4 kg (142 lb)     Physical Exam:  Constitutional: nad, aao x 3  Heart: rrr, no m/r/g, wwp, no edema  Lungs: ctab, no w/r/r/c, no lb  Abdomen: s/nt/nd, +BS      Body mass index is 24.46 kg/m².    Laboratory:  Recent Labs   Lab 09/09/22  0354 09/10/22  0404 09/12/22  0609    136 134*   K 4.0 3.4* 3.4*    107 103   CO2 18* 19* 21*   BUN 38* 42* 30*   CREATININE 7.0* 7.2* 6.4*   GLU 52* 124* 74         Recent Labs   Lab 09/07/22  1010 09/07/22  1803 09/08/22  0605 09/09/22  0354 09/10/22  0404 09/11/22  0447 09/12/22  0609 09/13/22  0549 09/14/22  0625   CALCIUM 7.8* 7.6*   < > 7.4* 6.2*  --  7.6*  --   --    ALBUMIN 2.7* 2.1*  --   --  1.7*  --   --   --   --    PHOS  --  5.4*  --   --   --   --   --   --   --    MG 1.3* 1.4*  --  2.2  2.2 1.9   < > 1.8 1.7 1.7    < > = values in this interval not displayed.         Recent Labs   Lab 09/30/19  0820 11/15/19  0748   PTH, Intact 176.4 H 238.4 H         No results for input(s): POCTGLUCOSE in the last 168 hours.    Recent Labs   Lab 11/15/19  0748 01/22/20  0848 05/25/21  0847   Hemoglobin A1C Invalid A SEE COMMENT 4.6         Recent Labs   Lab 09/09/22  0354 09/10/22  0404 09/11/22  0807 09/12/22  0609   WBC 14.83* 12.42 8.79 8.28   HGB 8.0* 7.4* 9.8* 9.1*   HCT 25.1* 24.0* 31.7* 29.4*    143* 200 195   * 113* 110* 109*   MCHC 31.9* 30.8* 30.9* 31.0*   MONO 5.8  0.9 5.1  0.6  --   --          Recent Labs   Lab 09/07/22  1010 09/07/22  1803 09/10/22  0404   BILITOT 0.8 0.9  --    PROT 6.0 4.5*  --    ALBUMIN 2.7* 2.1* 1.7*   ALKPHOS 135 97  --    ALT 12 9*  --    AST 16 16  --          Recent Labs   Lab  04/26/22  1706 06/21/22  1306 06/22/22  1550   Color, UA Yellow Yellow Yellow   Appearance, UA Hazy A Cloudy A Cloudy A   pH, UA 7.0 7.0 7.0   Specific [place], UA 1.010 1.010 1.010   Protein, UA 1+ A 2+ A 2+ A   Glucose, UA Trace A Negative Negative   Ketones, UA Negative Negative Negative   Urobilinogen, UA Negative Negative Negative   Bilirubin (UA) Negative Negative Negative   Occult Blood UA 3+ A 2+ A 3+ A   Nitrite, UA Negative Negative Negative   RBC, UA >100 H 22 H >100 H   WBC, UA >100 H >100 H >100 H   Bacteria Negative Many A Negative   Hyaline Casts, UA 8 A 10 A 444 A         Recent Labs   Lab 12/08/20 2024 06/22/22  1526 09/07/22  1022 09/07/22  1643   POC PH 7.370 7.324 L  --  7.380   POC PCO2 39.0 53.8 H  --  41.4   POC HCO3 22.5 L 28.0  --  24.5   POC PO2 85 40  --  64 HH   POC SATURATED O2 96 69 L  --  92 L   POC BE -3 2  --  -1   Sample ARTERIAL VENOUS VENOUS VENOUS         Microbiology Results (last 7 days)       Procedure Component Value Units Date/Time    Blood culture [[id number]] Collected: 09/07/22 0945    Order Status: Completed Specimen: Blood from Peripheral, Forearm, Right Updated: 09/12/22 1232     Blood Culture, Routine No growth after 5 days.    Blood culture [[id number]] Collected: 09/07/22 1009    Order Status: Completed Specimen: Blood from Peripheral, Ankle, Right Updated: 09/12/22 1232     Blood Culture, Routine No growth after 5 days.          ASSESSMENT/PLAN:     ESRD on HD MWF via AVF  Needle placement noted today  HD going fine    Anemia of CKD  Transfused this admission  Continue ADAM with HD    MBD / Secondary HPT  Renal diet    HTN  Controlled    Ischemic colitis  S/p surgery on 9/8 by Dr. Chavez    Thank you for allowing us to participate in the care of your patient!   We will follow the patient and provide recommendations as needed.    Patient care time was spent personally by me on the following activities: > 35 min  Obtaining a history.  Examination of  patient.  Providing medical care at the patients bedside.  Developing a treatment plan with patient or surrogate and bedside caregivers.  Ordering and reviewing laboratory studies, radiographic studies, pulse oximetry.  Ordering and performing treatments and interventions.  Evaluation of patient's response to treatment.  Discussions with consultants while on the unit and immediately available to the patient.  Re-evaluation of the patient's condition.  Documentation in the medical record.     Yasmin Parker MD      [facility]  [address] [place] [id number]    [phone] - tel  [phone] - fax    9/14/2022

## 2022-09-14 NOTE — PLAN OF CARE
Problem: Adult Inpatient Plan of Care  Goal: Plan of Care Review  Outcome: Met  Goal: Patient-Specific Goal (Individualized)  Outcome: Met  Goal: Absence of Hospital-Acquired Illness or Injury  Outcome: Met  Goal: Optimal Comfort and Wellbeing  Outcome: Met  Goal: Readiness for Transition of Care  Outcome: Met     Problem: Infection  Goal: Absence of Infection Signs and Symptoms  Outcome: Met     Problem: Impaired Wound Healing  Goal: Optimal Wound Healing  Outcome: Met     Problem: Adjustment to Illness (Sepsis/Septic Shock)  Goal: Optimal Coping  Outcome: Met     Problem: Bleeding (Sepsis/Septic Shock)  Goal: Absence of Bleeding  Outcome: Met     Problem: Glycemic Control Impaired (Sepsis/Septic Shock)  Goal: Blood Glucose Level Within Desired Range  Outcome: Met     Problem: Infection Progression (Sepsis/Septic Shock)  Goal: Absence of Infection Signs and Symptoms  Outcome: Met     Problem: Nutrition Impaired (Sepsis/Septic Shock)  Goal: Optimal Nutrition Intake  Outcome: Met     Problem: Fluid and Electrolyte Imbalance (Acute Kidney Injury/Impairment)  Goal: Fluid and Electrolyte Balance  Outcome: Met     Problem: Oral Intake Inadequate (Acute Kidney Injury/Impairment)  Goal: Optimal Nutrition Intake  Outcome: Met     Problem: Renal Function Impairment (Acute Kidney Injury/Impairment)  Goal: Effective Renal Function  Outcome: Met     Problem: Fluid Imbalance (Pneumonia)  Goal: Fluid Balance  Outcome: Met     Problem: Infection (Pneumonia)  Goal: Resolution of Infection Signs and Symptoms  Outcome: Met     Problem: Respiratory Compromise (Pneumonia)  Goal: Effective Oxygenation and Ventilation  Outcome: Met     Problem: Fall Injury Risk  Goal: Absence of Fall and Fall-Related Injury  Outcome: Met     Problem: Skin Injury Risk Increased  Goal: Skin Health and Integrity  Outcome: Met     Problem: Device-Related Complication Risk (Hemodialysis)  Goal: Safe, Effective Therapy Delivery  Outcome: Met     Problem:  Hemodynamic Instability (Hemodialysis)  Goal: Effective Tissue Perfusion  Outcome: Met     Problem: Infection (Hemodialysis)  Goal: Absence of Infection Signs and Symptoms  Outcome: Met

## 2022-09-14 NOTE — PLAN OF CARE
[facility]  Discharge Final Note    Primary Care Provider: April Horton MD    Expected Discharge Date: 9/14/2022     met with Pt at bedside to confirm discharge plans. Pt verbalized plan to return to [facility] via facility transport. Pt verbalized plan to have home health as well.  inquired about Pt's preference for home health. Pt verbalized she has had home health through [facility].  placed referral to [facility] home health.  [facility] and [facility] (F: [phone]).  emailed case  to aid in scheduling Pt's follow-up appointment with PCP. Pt has no other needs to be addressed by case management. Pt cleared to discharge by case management.      Final Discharge Note (most recent)       Final Note - 09/14/22 1420          Final Note    Assessment Type Final Discharge Note     Anticipated Discharge Disposition Home-Health Care [facility]     Hospital Resources/Appts/Education Provided Provided patient/caregiver with written discharge plan information;Appointments scheduled by Navigator/Coordinator        Post-Acute Status    Post-Acute Authorization Home Health     Home Health Status Set-up Complete/Auth obtained     Discharge Delays None known at this time                     Important Message from Medicare  Important Message from Medicare regarding Discharge Appeal Rights: Given to patient/caregiver, Explained to patient/caregiver, Signed/date by patient/caregiver     Date IMM was signed: 09/13/22  Time IMM was signed: 1423    Contact Info       Brigette Chavez MD   Specialty: General Surgery, Bariatrics, Surgery    [address]  [address]  [place] [address]   Phone: [phone]       Next Steps: Follow up in 1 week(s)    Yasmin Parker MD   Specialty: Nephrology    [address] [facility] [address] [facility] [address]  [place] [address]   Phone: [phone]       Next Steps: Follow up in  1 week(s)    April Horton MD   Specialty: Internal Medicine   Relationship: PCP - General    [address] [place] [address] [address]  [facility] [address]   Phone: [phone]       Next Steps: Follow up

## 2022-09-14 NOTE — DISCHARGE INSTRUCTIONS
Diet:  Resume regular diet    Physical Activity:  Activity as tolerated    Instructions:  1. Take all medications as prescribed  2. Keep all follow-up appointments  3. Return to the hospital or call your primary care physicians if any worsening symptoms such as fever, abdominal pain occur.    Follow-Up Appointments:  Please call your primary care physician to schedule an appointment in 1 week time.

## 2022-09-14 NOTE — DISCHARGE SUMMARY
[facility]  Discharge Summary  Patient Name: Shara Hutchins MRN: [id number]   Patient Class: IP- Inpatient  Length of Stay: 7   Admission Date: 9/7/2022  8:56 AM Attending Physician: Dalton Hammond MD   Primary Care Provider: April Horton MD Face-to-Face encounter date: 09/14/2022   Chief Complaint: Rectal Bleeding (C/o LLQ abd pain with N/V x 4 days with onset of rectal bleeding this morning. Pt reports passing large clots. Pt is febrile and pale in triage. )    Date of Discharge: 9/14/2022  Discharge Disposition:Home or Self Care    Condition: Stable       Reason for Hospitalization     Active Hospital Problems     Diagnosis    *Ischemic colitis s/p laprotomy with ischemic sigmoid colon, sigmoid rectum stricture    ESRD on dialysis MWF    Severe dehydration due to chronic diarrhea/high output ileostomy and reversed    Emphysema lung    S/P ureteral stent placement    Essential hypertension    Status post reversal of ileostomy    Severe malnutrition    DARCI (acute kidney injury)    Hypothyroidism    Anemia of chronic disease    Anemia due to stage 5 chronic kidney disease treated with erythropoietin    Hisotry of Vulvar cancer        Patient Active Problem List   Diagnosis    Vitamin D deficiency    Hisotry of Vulvar cancer    Anemia of decreased vitamin B12 absorption    Anemia due to stage 5 chronic kidney disease treated with erythropoietin    Hypothyroidism    Anemia of chronic disease    Nausea & vomiting    Chronic diarrhea    DARCI (acute kidney injury)    Severe dehydration due to chronic diarrhea/high output ileostomy and reversed    Electrolyte imbalance    Severe malnutrition    History of difficult venous access    Malnutrition    Acute hypoxemic respiratory failure    Status post reversal of ileostomy    Chronic diarrhea    Mild protein-energy malnutrition    Perineal rash, female    Perianal cellulitis    Pneumonia due to infectious organism    Right hydroureter secondary to obstructing  calculi    Right-sided pyelonephritis    ESRD on dialysis MWF    Macrocytic anemia    Constipation    Bilateral LE neuropathy    Essential hypertension    Nephrolithiasis    Sepsis    Hypomagnesemia    Acute on chronic anemia    Vulvar cellulitis    Lymphedema due to radiation    Urinary incontinence    S/P ureteral stent placement    Thrombocytopenia    Secondary hyperparathyroidism of renal origin    Stenosis of other vascular prosthetic devices, implants and grafts, initial encounter    History of cancer of vulva    Vitamin B12 deficiency    Chest pain    Emphysema lung    Ureterolithiasis    COVID    Ischemic colitis s/p laprotomy with ischemic sigmoid colon, sigmoid rectum stricture       Brief History of Present Illness    Shara Hutchins is a 72 y.o. female who  has a past medical history of Cellulitis of trunk, Chronic diarrhea (02/10/2020), End stage renal disease on dialysis (01/07/2018), Fatigue, Hypertension, Hypothyroidism (02/10/2020), Iron deficiency anemia due to chronic blood loss (01/07/2018), Pulmonary hypertension (03/20/2020), and Vulvar cancer (01/07/2019).. The patient presented to [facility] on 9/7[facility]2022 with a primary complaint of Rectal Bleeding (C/o LLQ abd pain with N/V x 4 days with onset of rectal bleeding this morning. Pt reports passing large clots. Pt is febrile and pale in triage. )  .     For the full HPI please refer to the History & Physical from this admission.    Hospital Course By Problem with Pertinent Findings     72-year-old ESRD on HD MWF and history of multiple abdominal surgeries for ischemic colitis presents with bloody diarrhea and severe abdominal pain, a was diagnosed with ischemic colitis and underwent surgery. Post operative ly did well without any complications. Diet advance as tolerated. Nephrology managed the dialysis. Discharged today in stable condition to [facility]. Patient to follow up with surgeon to remove drains.     Patient was seen and  "examined on the date of discharge and determined to be suitable for discharge.    Physical Exam  BP (!) 101/56   Pulse 88   Temp 98.4 °F (36.9 °C) (Oral)   Resp 17   Ht 5' 7" (1.702 m)   Wt 70.9 kg (156 lb 3.2 oz)   SpO2 96%   BMI 24.46 kg/m²   Vitals reviewed.    Constitutional: No distress.   HENT: Atraumatic.   Cardiovascular: Normal rate, regular rhythm and normal heart sounds.   Pulmonary/Chest: Effort normal. Clear to auscultation bilaterally. No wheezes.   Abdominal: Soft. Bowel sounds are normal. Exhibits no distension and no mass. No tenderness  Neurological: Alert.   Skin: Skin is warm and dry.     Following labs were Reviewed   Recent Labs   Lab 09/14/22  0904   CALCIUM 8.0*   *   K 3.2*   CO2 22*      BUN 35*   CREATININE 5.9*     No results found for: POCTGLUCOSE     All labs within the past 24 hours have been reviewed    Microbiology Results (last 7 days)       Procedure Component Value Units Date/Time    Blood culture [[id number]] Collected: 09/07/22 0945    Order Status: Completed Specimen: Blood from Peripheral, Forearm, Right Updated: 09/12/22 1232     Blood Culture, Routine No growth after 5 days.    Blood culture [[id number]] Collected: 09/07/22 1009    Order Status: Completed Specimen: Blood from Peripheral, Ankle, Right Updated: 09/12/22 1232     Blood Culture, Routine No growth after 5 days.          US Hemodialysis Access   Final Result      X-Ray Chest AP Portable   Final Result      CT Abdomen Pelvis  Without Contrast   Final Result      X-ray Chest AP Portable   Final Result          No results found for this or any previous visit.      Consultants and Procedures   Consultants:  Consults (From admission, onward)          Status Ordering Provider     IP consult to case management  Once        Provider:  (Not yet assigned)    Acknowledged BERE SALAZAR     Inpatient consult to Nephrology  Once        Provider:  Jorge Webb MD    Completed RACHAEL DIEGO" Inpatient consult to General surgery  Once        Provider:  Brigette Chavez MD    Completed RACHAEL DIEGO            Procedures:   Exploratory Laparotomy  Colon resection  Osteotomy     Discharge Information:   Diet:  Resume regular diet    Physical Activity:  Activity as tolerated    Instructions:  1. Take all medications as prescribed  2. Keep all follow-up appointments  3. Return to the hospital or call your primary care physicians if any worsening symptoms such as fever, abdominal pain occur.    Follow-Up Appointments:  Please call your primary care physician to schedule an appointment in 1 week time.       Follow-up Information       Brigette Chavez MD Follow up in 1 week(s).    Specialties: General Surgery, Bariatrics, Surgery  Contact information:  [address]  [address]  [place] [address]  [phone]               Yasmin Parker MD Follow up in 1 week(s).    Specialty: Nephrology  Contact information:  [address]  [facility] [address] [address]  [place] [address]  [phone]                               Pending laboratory work/Tests to be performed/followed by the Primary care Physician: none    The patient was discharged in the care of her parents//wife/family/caregiver, with discharge instructions were reviewed in written and verbal form. All pertinent questions were discussed and prescriptions were provided. The importance of making follow up appointments and compliance of medications has been stressed repeatedly. The patient will follow up in 1 week or sooner as needed with the PCP, and the patient is on board with the plan. Upon discharge, patient needs to be on following medications.    Discharge Medications:     Medication List        START taking these medications      oxyCODONE 5 MG immediate release tablet  Commonly known as: ROXICODONE  Take 1 tablet (5 mg total) by mouth every 4 (four) hours as needed for Pain.            CHANGE how you take these medications       levothyroxine 88 MCG tablet  Commonly known as: SYNTHROID  TAKE 1 TABLET BEFORE BREAKFAST  What changed:   how much to take  how to take this  when to take this            CONTINUE taking these medications      AURYXIA 210 mg iron Tab  Generic drug: ferric citrate     cholecalciferol (vitamin D3) 25 mcg (1,000 unit) capsule  Commonly known as: VITAMIN D3  Take 1 capsule (1,000 Units total) by mouth once daily.     diltiaZEM 120 MG Cp24  Commonly known as: CARDIZEM CD  Take 1 capsule (120 mg total) by mouth once daily.     gabapentin 100 MG capsule  Commonly known as: NEURONTIN     hydrALAZINE 50 MG tablet  Commonly known as: APRESOLINE  Take 1 tablet (50 mg total) by mouth every 8 (eight) hours.     LORazepam 0.5 MG tablet  Commonly known as: ATIVAN  Take 1 tablet (0.5 mg total) by mouth every evening.     promethazine 25 MG tablet  Commonly known as: PHENERGAN  TAKE 1 TABLET (25 MG TOTAL) BY MOUTH 2 (TWO) TIMES DAILY AS NEEDED FOR NAUSEA.     rOPINIRole 0.5 MG tablet  Commonly known as: REQUIP     sertraline 50 MG tablet  Commonly known as: ZOLOFT  Take 1 tablet (50 mg total) by mouth every evening. Take 50 mg by mouth every evening.     traMADoL 50 mg tablet  Commonly known as: ULTRAM  Take 1 tablet (50 mg total) by mouth every 6 (six) hours as needed.     zinc gluconate 50 mg tablet            STOP taking these medications      miconazole 2 % cream  Commonly known as: MICOTIN               Where to Get Your Medications        These medications were sent to [facility]/pharmacy #[id number] - [place] - [address]  [address] [place] [address]      Hours: 24-hours Phone: [phone]   oxyCODONE 5 MG immediate release tablet           I spent 40 minutes preparing the discharge including reviewing records from previous encounters, preparation of discharge summary, assessing and final examination of the patient, discharge medicine reconciliation, discussing plan of care, follow up and education and prescriptions.        Dalton Hammond  [facility] Hospitalist  09/14/2022

## 2022-09-15 NOTE — ED PROVIDER NOTES
Encounter Date: 9/15/2022       History     Chief Complaint   Patient presents with    Fall     Patient presents complaining of fall, weakness, near syncope.  Patient was discharged yesterday from the hospital after a stay with ischemic colitis and colectomy.  Patient has end-stage renal disease.  She received dialysis yesterday in the hospital.  Patient states last night while at home she began to feel extremely weak and then fell backward.  Patient states she does not think she lost consciousness.  She was too weak though to get up and laid on the floor throughout the night.  She complains of left elbow pain.  She complains of mild bilateral rib pain.  She does not think she hit her head.    Review of patient's allergies indicates:   Allergen Reactions    Ciprofloxacin Other (See Comments)     Elevated liver enzymes      Pcn [penicillins] Anaphylaxis     TOLERATES ROCEPHIN WITHOUT DIFFICULTY     Past Medical History:   Diagnosis Date    Cellulitis of trunk     Perineum, numerous episodes    Chronic diarrhea 02/10/2020    Short-gut syndrome    End stage renal disease on dialysis 01/07/2018    Fatigue     Hypertension     Hypothyroidism 02/10/2020    Iron deficiency anemia due to chronic blood loss 01/07/2018    Pulmonary hypertension 03/20/2020    Vulvar cancer 01/07/2019     Past Surgical History:   Procedure Laterality Date    ABDOMINAL SURGERY      ANGIOGRAPHY OF ARTERIOVENOUS SHUNT N/A 01/19/2022    Procedure: Fistulogram with Possible Intervention;  Surgeon: Raz Maher MD;  Location: [facility] CATH/EP LAB;  Service: Cardiothoracic;  Laterality: N/A;    APPENDECTOMY      AV FISTULA PLACEMENT Left 2018    BACK SURGERY      BLADDER FULGURATION N/A 06/16/2020    Procedure: FULGURATION, BLADDER;  Surgeon: Damari Barber Jr., MD;  Location: [facility] OR;  Service: Urology;  Laterality: N/A;    carpel tunnel surgery once on each hand      CERVICAL FUSION      x 4    cervix repair      CHOLECYSTECTOMY  2000    COLECTOMY       CYSTOSCOPY W/ RETROGRADES  06/16/2020    Procedure: CYSTOSCOPY, WITH RETROGRADE PYELOGRAM;  Surgeon: Damari Barber Jr., MD;  Location: [facility] OR;  Service: Urology;;    CYSTOSCOPY W/ RETROGRADES Right 04/20/2021    Procedure: CYSTOSCOPY, WITH RETROGRADE PYELOGRAM;  Surgeon: Damari Barber Jr., MD;  Location: [facility] OR;  Service: Urology;  Laterality: Right;    CYSTOSCOPY W/ URETERAL STENT PLACEMENT Right 03/16/2021    Procedure: CYSTOSCOPY, WITH URETERAL STENT INSERTION;  Surgeon: Damari Barber Jr., MD;  Location: [facility] OR;  Service: Urology;  Laterality: Right;    CYSTOSCOPY W/ URETERAL STENT PLACEMENT Right 05/18/2021    Procedure: CYSTOSCOPY, WITH URETERAL STENT INSERTION;  Surgeon: Damari Barber Jr., MD;  Location: [facility] OR;  Service: Urology;  Laterality: Right;    CYSTOSCOPY W/ URETERAL STENT PLACEMENT Right 06/22/2022    Procedure: CYSTOSCOPY, WITH URETERAL STENT INSERTION;  Surgeon: Gaby Garcia MD;  Location: [facility];  Service: Urology;  Laterality: Right;  cysto stent exchange    CYSTOSCOPY W/ URETERAL STENT REMOVAL Right 04/20/2021    Procedure: CYSTOSCOPY, WITH URETERAL STENT REMOVAL;  Surgeon: Damari Barber Jr., MD;  Location: [facility] OR;  Service: Urology;  Laterality: Right;    CYSTOSCOPY WITH URETEROSCOPY, RETROGRADE PYELOGRAPHY, AND INSERTION OF STENT Right 03/30/2021    Procedure: CYSTOSCOPY, WITH RETROGRADE PYELOGRAM AND URETERAL STENT INSERTION;  Surgeon: Damari Barber Jr., MD;  Location: [facility] OR;  Service: Urology;  Laterality: Right;    CYSTOURETEROSCOPY WITH RETROGRADE PYELOGRAPHY AND INSERTION OF STENT INTO URETER Right 01/18/2022    Procedure: CYSTOURETEROSCOPY, WITH RETROGRADE PYELOGRAM AND URETERAL STENT INSERTION;  Surgeon: Damari Barber Jr., MD;  Location: [facility];  Service: Urology;  Laterality: Right;    CYSTOURETEROSCOPY WITH RETROGRADE PYELOGRAPHY AND INSERTION OF STENT INTO URETER Right 8/30/2022    Procedure: CYSTOURETEROSCOPY, WITH RETROGRADE PYELOGRAM AND URETERAL  STENT INSERTION;  Surgeon: Damari Barber Jr., MD;  Location: [facility] OR;  Service: Urology;  Laterality: Right;    DIAGNOSTIC LAPAROSCOPY N/A 02/13/2020    Procedure: LAPAROSCOPY, DIAGNOSTIC;  Surgeon: Robbi Lovell III, MD;  Location: [facility] OR;  Service: General;  Laterality: N/A;    HYSTERECTOMY  1980    ILEOSTOMY N/A 02/13/2020    Procedure: CREATION, ILEOSTOMY Loop;  Surgeon: Robbi Lovell III, MD;  Location: [facility] OR;  Service: General;  Laterality: N/A;    ILEOSTOMY CLOSURE N/A 05/18/2020    Procedure: CLOSURE, ILEOSTOMY;  Surgeon: Robbi Lovell III, MD;  Location: [facility] OR;  Service: General;  Laterality: N/A;    LASER LITHOTRIPSY Right 03/30/2021    Procedure: LITHOTRIPSY, USING LASER;  Surgeon: Damari Barber Jr., MD;  Location: [facility] OR;  Service: Urology;  Laterality: Right;    LUMBAR LAMINECTOMY      LYSIS OF ADHESIONS N/A 02/13/2020    Procedure: LYSIS, ADHESIONS;  Surgeon: Robbi Lovell III, MD;  Location: [facility] OR;  Service: General;  Laterality: N/A;    NECK SURGERY      OSTEOTOMY  9/7/2022    Procedure: OSTEOTOMY;  Surgeon: Brigette Chavez MD;  Location: [facility] OR;  Service: General;;    PERCUTANEOUS TRANSLUMINAL ANGIOPLASTY OF ARTERIOVENOUS FISTULA N/A 01/19/2022    Procedure: PTA, AV FISTULA;  Surgeon: Raz Maher MD;  Location: [facility] CATH/EP LAB;  Service: Cardiothoracic;  Laterality: N/A;    PHLEBOGRAPHY  02/28/2020    Procedure: VENOGRAM;  Surgeon: Robbi Lovell III, MD;  Location: [facility] OR;  Service: General;;    REMOVAL OF VASCULAR ACCESS PORT Right 02/13/2020    Procedure: REMOVAL, VASCULAR ACCESS PORT;  Surgeon: Robbi Lovell III, MD;  Location: [facility] OR;  Service: General;  Laterality: Right;    RETROGRADE PYELOGRAPHY  03/16/2021    Procedure: PYELOGRAM, RETROGRADE;  Surgeon: Damari Barber Jr., MD;  Location: [facility] OR;  Service: Urology;;    RETROGRADE PYELOGRAPHY Right 05/18/2021    Procedure: PYELOGRAM, RETROGRADE;  Surgeon: Damari Barber Jr., MD;  Location:  [facility] OR;  Service: Urology;  Laterality: Right;    RETROGRADE PYELOGRAPHY  2022    Procedure: PYELOGRAM, RETROGRADE;  Surgeon: Gaby Garcia MD;  Location: [facility] OR;  Service: Urology;;    SHOULDER SURGERY      TONSILLECTOMY      URETEROSCOPIC REMOVAL OF URETERIC CALCULUS Right 2021    Procedure: REMOVAL, CALCULUS, URETER, URETEROSCOPIC;  Surgeon: Damari Barber Jr., MD;  Location: [facility] OR;  Service: Urology;  Laterality: Right;    URETEROSCOPIC REMOVAL OF URETERIC CALCULUS Right 2021    Procedure: REMOVAL, CALCULUS, URETER, URETEROSCOPIC;  Surgeon: Damari Barber Jr., MD;  Location: [facility] OR;  Service: Urology;  Laterality: Right;    URETEROSCOPIC REMOVAL OF URETERIC CALCULUS Right 2022    Procedure: REMOVAL, CALCULUS, URETER, URETEROSCOPIC;  Surgeon: Damari Barber Jr., MD;  Location: [facility] OR;  Service: Urology;  Laterality: Right;    vulva cancer       Family History   Problem Relation Age of Onset    Heart disease Mother     Hypertension Mother     Cancer Mother         lung    Heart disease Father     Heart disease Sister         Open heart surgery    No Known Problems Daughter      Social History     Tobacco Use    Smoking status: Former     Packs/day: 1.00     Years: 33.00     Pack years: 33.00     Types: Cigarettes     Quit date: 2000     Years since quittin.7    Smokeless tobacco: Former     Quit date:    Substance Use Topics    Alcohol use: No    Drug use: No     Review of Systems   All other systems reviewed and are negative.    Physical Exam     Initial Vitals   BP Pulse Resp Temp SpO2   09/15/22 1111 09/15/22 1111 09/15/22 1112 09/15/22 1111 09/15/22 1111   127/60 83 16 98.3 °F (36.8 °C) 96 %      MAP       --                Physical Exam    Nursing note and vitals reviewed.  Constitutional:   Patient is pleasant polite and smells of stool   HENT:   Head: Normocephalic and atraumatic.   Eyes: EOM are normal. Pupils are equal, round, and reactive to light.    Neck: Neck supple.   Normal range of motion.  Cardiovascular:  Normal rate, regular rhythm, normal heart sounds and intact distal pulses.           Pulmonary/Chest: Breath sounds normal. No respiratory distress.   Abdominal:   Midline incision is clean, dry, intact.  There is no erythema warmth or cellulitis.  Drains appear intact.  Patient ileostomy bag has ruptured and there is stool over the abdomen.   Musculoskeletal:         General: Normal range of motion.      Cervical back: Normal range of motion and neck supple.      Comments: There is full range of motion of all major joints including hips knees ankles elbows shoulders.  There is no obvious deformity to any major joints.  Patient does have a skin tear to the left elbow with a contusion.  She also has a small contusion to the right knee.  Patient was rolled onto her left side and back exam appears normal however she does have some mild breakdown in the sacral area.     Neurological: She is alert and oriented to person, place, and time.   Skin: Skin is warm and dry. Capillary refill takes less than 2 seconds.   Psychiatric: She has a normal mood and affect. Her behavior is normal. Judgment and thought content normal.       ED Course   Procedures  Labs Reviewed   COMPREHENSIVE METABOLIC PANEL - Abnormal; Notable for the following components:       Result Value    BUN 26 (*)     Creatinine 4.7 (*)     Albumin 2.2 (*)     Alkaline Phosphatase 192 (*)     eGFR 9.3 (*)     All other components within normal limits   CBC W/ AUTO DIFFERENTIAL - Abnormal; Notable for the following components:    RBC 3.02 (*)     Hemoglobin 10.1 (*)     Hematocrit 32.5 (*)      (*)     MCH 33.4 (*)     MCHC 31.1 (*)     RDW 18.2 (*)     Immature Granulocytes 1.5 (*)     Gran # (ANC) 9.1 (*)     Immature Grans (Abs) 0.16 (*)     Gran % 82.8 (*)     Lymph % 9.1 (*)     All other components within normal limits   PROTIME-INR - Abnormal; Notable for the following components:     PT 20.5 (*)     All other components within normal limits   TROPONIN I - Abnormal; Notable for the following components:    Troponin I 0.045 (*)     All other components within normal limits   MAGNESIUM   CK        ECG Results              EKG 12-LEAD (In process)  Result time 09/15/22 11:33:06      In process by Interface, Lab In [facility] (09/15/22 11:33:06)                   Narrative:    Test Reason : R07.9,    Vent. Rate : 086 BPM     Atrial Rate : 086 BPM     P-R Int : 160 ms          QRS Dur : 082 ms      QT Int : 380 ms       P-R-T Axes : 067 034 033 degrees     QTc Int : 454 ms    Sinus rhythm with Premature supraventricular complexes  Nonspecific ST and T wave abnormality  Abnormal ECG  When compared with ECG of 07-SEP-2022 09:57,  Criteria for Inferior infarct are no longer Present  Non-specific change in ST segment in Lateral leads    Referred By: [email or web address]   SELF           Confirmed By:                                   Imaging Results              US Carotid Bilateral (In process)                      X-ray Chest AP Portable (Final result)  Result time 09/15/22 11:12:46      Final result by Elias Sherwood MD (09/15/22 11:12:46)                   Narrative:    Reason: Chest pain.    FINDINGS:    Portable chest with comparison chest x-ray September 7, 2022 show normal cardiomediastinal silhouette.  Lungs are clear. Pulmonary vasculature is normal. No acute osseous abnormality.    IMPRESSION:    No acute cardiopulmonary abnormality.    Electronically signed by:  Elias Sherwood DO  9/15/2022 11:12 AM CDT Workstation: [id number]                                     X-Ray Elbow Complete Left (Final result)  Result time 09/15/22 11:14:18      Final result by Mike Martinez MD (09/15/22 11:14:18)                   Narrative:    HISTORY: Left elbow pain post fall.    FINDINGS: 4 views of the left elbow show no acute fracture, dislocation or destructive osseous lesion. The elbow joint spaces are preserved, with  no evidence of an elbow joint effusion. The bones are diffusely osteopenic. There are scattered vascular surgical clips and upper arm endovascular stent.    IMPRESSION: Negative for acute fracture or dislocation.    Electronically signed by:  Mike Martinez MD  9/15/2022 11:14 AM CDT Workstation: [id number]                                     CT Head Without Contrast (Final result)  Result time 09/15/22 11:03:49      Final result by Elias Sherwood MD (09/15/22 11:03:49)                   Narrative:    CMS MANDATED QUALITY DATA - CT RADIATION - 436    All CT scans at this facility utilize dose modulation, iterative reconstruction, and/or weight based dosing when appropriate to reduce radiation dose to as low as reasonably achievable.        Reason: Head trauma, intracranial venous injury suspected Fell this am no loc    TECHNIQUE: Head CT without IV contrast.    COMPARISON: CT head August 27, 2022    FINDINGS:    Gray-white differentiation is maintained without hemorrhage, midline shift, or mass effect.    The ventricles and cisterns are maintained.    Calvarium is intact. Visualized sinuses are clear.    IMPRESSION:    No acute intracranial abnormality.        Electronically signed by:  Elias Sherwood DO  9/15/2022 11:03 AM CDT Workstation: [id number]                                     Medications   sodium chloride 0.9% flush 10 mL (has no administration in time range)   sodium chloride 0.9% flush 10 mL (has no administration in time range)   melatonin tablet 9 mg (has no administration in time range)   ondansetron injection 4 mg (has no administration in time range)   prochlorperazine injection Soln 5 mg (has no administration in time range)   acetaminophen tablet 1,000 mg (has no administration in time range)   simethicone chewable tablet 80 mg (has no administration in time range)   aluminum-magnesium hydroxide-simethicone 200-200-20 mg/5 mL suspension 30 mL (has no administration in time range)   acetaminophen  tablet 650 mg (has no administration in time range)   HYDROcodone-acetaminophen 5-325 mg per tablet 1 tablet (has no administration in time range)   morphine injection 4 mg (has no administration in time range)   naloxone 0.4 mg/mL injection 0.02 mg (has no administration in time range)   glucose chewable tablet 16 g (has no administration in time range)   glucose chewable tablet 24 g (has no administration in time range)   glucagon (human recombinant) injection 1 mg (has no administration in time range)   dextrose 10% bolus 125 mL (has no administration in time range)   dextrose 10% bolus 250 mL (has no administration in time range)   morphine injection 4 mg (4 mg Intravenous Given 9/15/22 1138)   ondansetron injection 4 mg (4 mg Intravenous Given 9/15/22 1138)     Medical Decision Making:   Initial Assessment:   Patient is in no acute distress but is disheveled and smells of stool  Differential Diagnosis:   Considerations include but are not limited to electrolyte abnormalities, dehydration, dysrhythmia, intracranial hemorrhage, infection, orthostasis  Independently Interpreted Test(s):   I have ordered and independently interpreted EKG Reading(s) - see summary below       <> Summary of EKG Reading(s): EKG is regular rate and rhythm without ST changes  Clinical Tests:   Lab Tests: Reviewed and Ordered  Radiological Study: Ordered and Reviewed  Medical Tests: Reviewed and Ordered  ED Management:  In the emergency department patient was cleaned at the bedside and new colostomy bag applied.  Repeat assessment abdomen is soft nontender nonsurgical.  Patient's blood work was reviewed.  EKG was reviewed.  Patient was consulted to Hospital Medicine secondary to syncope near-syncope event.  She remains clinically stable.                        Clinical Impression:   Final diagnoses:  [W19.XXXA] Fall  [R55] Syncope, unspecified syncope type (Primary)        ED Disposition Condition    Admit Stable                Fran JAMESON  MD Liliana  09/15/22 1803

## 2022-09-15 NOTE — ED NOTES
Orthostats       09/15/22 1329 09/15/22 1330 09/15/22 1333   Vital Signs   Pulse 79 88 93   BP (!) 100/53 (!) 113/58 (!) 146/64   MAP (mmHg) 73 80 92   Patient Position Lying Sitting Standing

## 2022-09-15 NOTE — H&P
[facility] Medicine History & Physical Examination   Patient Name: Shara Hutchins  MRN: [id number]  Patient Class: Emergency   Admission Date: 9/15/2022  9:53 AM  Length of Stay: 0  Attending Physician: Dalton Hammond MD  Primary Care Provider: April Horton MD  Face-to-Face encounter date: 09/15/2022  Code Status: Full Code  Chief Complaint: Fall        HISTORY OF PRESENT ILLNESS:   Shara Hutchins is a 72 y.o. White female with known history of Ischemic colitis s/p laprotomy with ischemic sigmoid colon, sigmoid rectum stricture who was discharged yesterday to [facility] presented again with a fall. Patient reports after being discharged she went there and slept. Later she woke up and went to bathroom to clean her ostomy bad and do wound care. When she was returning back, she felt light headed and fell.She did not have her emergency alert necklace and no one checked on her overnight. She laid on the floor overnight and dragged herself out of th bathroom to living room all covered with stools. This morning, facility staff found her and call EMS.In the ER, her vitals and labs are stable. She reports severe weakness and abdominal pain. Patient denies change in vision, hearing, headache, fever, cough, congestion, runny nose, chest pain, shortness of breath, palpitations,  diarrhea, constipation, vomiting, dysuria, hematuria, joint pain and back pain. Hospital medicine consulted for admission.    REVIEW OF SYSTEMS:   10 Point Review of System was performed and was found to be negative except for that mentioned already in the HPI above.     PAST MEDICAL HISTORY:     Past Medical History:   Diagnosis Date    Cellulitis of trunk     Perineum, numerous episodes    Chronic diarrhea 02/10/2020    Short-gut syndrome    End stage renal disease on dialysis 01/07/2018    Fatigue     Hypertension     Hypothyroidism 02/10/2020    Iron deficiency anemia due to chronic blood loss 01/07/2018    Pulmonary  hypertension 03/20/2020    Vulvar cancer 01/07/2019       PAST SURGICAL HISTORY:     Past Surgical History:   Procedure Laterality Date    ABDOMINAL SURGERY      ANGIOGRAPHY OF ARTERIOVENOUS SHUNT N/A 01/19/2022    Procedure: Fistulogram with Possible Intervention;  Surgeon: Raz Maher MD;  Location: [facility] CATH/EP LAB;  Service: Cardiothoracic;  Laterality: N/A;    APPENDECTOMY      AV FISTULA PLACEMENT Left 2018    BACK SURGERY      BLADDER FULGURATION N/A 06/16/2020    Procedure: FULGURATION, BLADDER;  Surgeon: Damari Barber Jr., MD;  Location: [facility] OR;  Service: Urology;  Laterality: N/A;    carpel tunnel surgery once on each hand      CERVICAL FUSION      x 4    cervix repair      CHOLECYSTECTOMY  2000    COLECTOMY      CYSTOSCOPY W/ RETROGRADES  06/16/2020    Procedure: CYSTOSCOPY, WITH RETROGRADE PYELOGRAM;  Surgeon: Damari Barber Jr., MD;  Location: [facility] OR;  Service: Urology;;    CYSTOSCOPY W/ RETROGRADES Right 04/20/2021    Procedure: CYSTOSCOPY, WITH RETROGRADE PYELOGRAM;  Surgeon: Damari Barber Jr., MD;  Location: [facility] OR;  Service: Urology;  Laterality: Right;    CYSTOSCOPY W/ URETERAL STENT PLACEMENT Right 03/16/2021    Procedure: CYSTOSCOPY, WITH URETERAL STENT INSERTION;  Surgeon: Damari Barber Jr., MD;  Location: [facility] OR;  Service: Urology;  Laterality: Right;    CYSTOSCOPY W/ URETERAL STENT PLACEMENT Right 05/18/2021    Procedure: CYSTOSCOPY, WITH URETERAL STENT INSERTION;  Surgeon: Damari Barber Jr., MD;  Location: [facility] OR;  Service: Urology;  Laterality: Right;    CYSTOSCOPY W/ URETERAL STENT PLACEMENT Right 06/22/2022    Procedure: CYSTOSCOPY, WITH URETERAL STENT INSERTION;  Surgeon: Gaby Garcia MD;  Location: [facility] OR;  Service: Urology;  Laterality: Right;  cysto stent exchange    CYSTOSCOPY W/ URETERAL STENT REMOVAL Right 04/20/2021    Procedure: CYSTOSCOPY, WITH URETERAL STENT REMOVAL;  Surgeon: Damari Barber Jr., MD;  Location: [facility] OR;  Service: Urology;  Laterality:  Right;    CYSTOSCOPY WITH URETEROSCOPY, RETROGRADE PYELOGRAPHY, AND INSERTION OF STENT Right 03/30/2021    Procedure: CYSTOSCOPY, WITH RETROGRADE PYELOGRAM AND URETERAL STENT INSERTION;  Surgeon: Damari Barber Jr., MD;  Location: [facility] OR;  Service: Urology;  Laterality: Right;    CYSTOURETEROSCOPY WITH RETROGRADE PYELOGRAPHY AND INSERTION OF STENT INTO URETER Right 01/18/2022    Procedure: CYSTOURETEROSCOPY, WITH RETROGRADE PYELOGRAM AND URETERAL STENT INSERTION;  Surgeon: Damari Barber Jr., MD;  Location: [facility] OR;  Service: Urology;  Laterality: Right;    CYSTOURETEROSCOPY WITH RETROGRADE PYELOGRAPHY AND INSERTION OF STENT INTO URETER Right 8/30/2022    Procedure: CYSTOURETEROSCOPY, WITH RETROGRADE PYELOGRAM AND URETERAL STENT INSERTION;  Surgeon: Damari Barber Jr., MD;  Location: [facility] OR;  Service: Urology;  Laterality: Right;    DIAGNOSTIC LAPAROSCOPY N/A 02/13/2020    Procedure: LAPAROSCOPY, DIAGNOSTIC;  Surgeon: Robbi Lovell III, MD;  Location: [facility] OR;  Service: General;  Laterality: N/A;    HYSTERECTOMY  1980    ILEOSTOMY N/A 02/13/2020    Procedure: CREATION, ILEOSTOMY Loop;  Surgeon: Robbi Lovell III, MD;  Location: [facility] OR;  Service: General;  Laterality: N/A;    ILEOSTOMY CLOSURE N/A 05/18/2020    Procedure: CLOSURE, ILEOSTOMY;  Surgeon: Robbi Lovell III, MD;  Location: [facility];  Service: General;  Laterality: N/A;    LASER LITHOTRIPSY Right 03/30/2021    Procedure: LITHOTRIPSY, USING LASER;  Surgeon: Damari Barber Jr., MD;  Location: [facility] OR;  Service: Urology;  Laterality: Right;    LUMBAR LAMINECTOMY      LYSIS OF ADHESIONS N/A 02/13/2020    Procedure: LYSIS, ADHESIONS;  Surgeon: Robbi Lovell III, MD;  Location: [facility] OR;  Service: General;  Laterality: N/A;    NECK SURGERY      OSTEOTOMY  9/7/2022    Procedure: OSTEOTOMY;  Surgeon: Brigette Chavez MD;  Location: [facility] OR;  Service: General;;    PERCUTANEOUS TRANSLUMINAL ANGIOPLASTY OF ARTERIOVENOUS FISTULA N/A 01/19/2022     Procedure: PTA, AV FISTULA;  Surgeon: Raz Maher MD;  Location: [facility] CATH/EP LAB;  Service: Cardiothoracic;  Laterality: N/A;    PHLEBOGRAPHY  02/28/2020    Procedure: VENOGRAM;  Surgeon: Robbi Lovell III, MD;  Location: [facility] OR;  Service: General;;    REMOVAL OF VASCULAR ACCESS PORT Right 02/13/2020    Procedure: REMOVAL, VASCULAR ACCESS PORT;  Surgeon: Robbi Lovell III, MD;  Location: [facility] OR;  Service: General;  Laterality: Right;    RETROGRADE PYELOGRAPHY  03/16/2021    Procedure: PYELOGRAM, RETROGRADE;  Surgeon: Damari Barber Jr., MD;  Location: [facility] OR;  Service: Urology;;    RETROGRADE PYELOGRAPHY Right 05/18/2021    Procedure: PYELOGRAM, RETROGRADE;  Surgeon: Damari Barber Jr., MD;  Location: [facility];  Service: Urology;  Laterality: Right;    RETROGRADE PYELOGRAPHY  06/22/2022    Procedure: PYELOGRAM, RETROGRADE;  Surgeon: Gaby Garcia MD;  Location: [facility] OR;  Service: Urology;;    SHOULDER SURGERY      TONSILLECTOMY      URETEROSCOPIC REMOVAL OF URETERIC CALCULUS Right 03/30/2021    Procedure: REMOVAL, CALCULUS, URETER, URETEROSCOPIC;  Surgeon: Damari Barber Jr., MD;  Location: [facility];  Service: Urology;  Laterality: Right;    URETEROSCOPIC REMOVAL OF URETERIC CALCULUS Right 04/20/2021    Procedure: REMOVAL, CALCULUS, URETER, URETEROSCOPIC;  Surgeon: Damari Barber Jr., MD;  Location: [facility];  Service: Urology;  Laterality: Right;    URETEROSCOPIC REMOVAL OF URETERIC CALCULUS Right 8/30/2022    Procedure: REMOVAL, CALCULUS, URETER, URETEROSCOPIC;  Surgeon: Damari Barber Jr., MD;  Location: [facility] OR;  Service: Urology;  Laterality: Right;    vulva cancer         ALLERGIES:   Ciprofloxacin and Pcn [penicillins]    FAMILY HISTORY:     Family History   Problem Relation Age of Onset    Heart disease Mother     Hypertension Mother     Cancer Mother         lung    Heart disease Father     Heart disease Sister         Open heart surgery    No Known Problems Daughter         SOCIAL HISTORY:     Social History     Tobacco Use    Smoking status: Former     Packs/day: 1.00     Years: 33.00     Pack years: 33.00     Types: Cigarettes     Quit date: 2000     Years since quittin.7    Smokeless tobacco: Former     Quit date:    Substance Use Topics    Alcohol use: No        Social History     Substance and Sexual Activity   Sexual Activity Not on file        HOME MEDICATIONS:     Prior to Admission medications    Medication Sig Start Date End Date Taking? Authorizing Provider   cholecalciferol, vitamin D3, (VITAMIN D3) 25 mcg (1,000 unit) capsule Take 1 capsule (1,000 Units total) by mouth once daily. 22  Yes Christiane Brice MD   diltiaZEM (CARDIZEM CD) 120 MG Cp24 Take 1 capsule (120 mg total) by mouth once daily. 22  Yes April Horton MD   ferric citrate (AURYXIA) 210 mg iron Tab Take 2 tablets by mouth 3 (three) times daily with meals.   Yes Historical Provider   gabapentin (NEURONTIN) 100 MG capsule Take 100 mg by mouth 2 (two) times daily. HCS   Yes Historical Provider   levothyroxine (SYNTHROID) 88 MCG tablet TAKE 1 TABLET BEFORE BREAKFAST  Patient taking differently: Take 88 mcg by mouth before breakfast. TAKE 1 TABLET BEFORE BREAKFAST 22  Yes April Horton MD   LORazepam (ATIVAN) 0.5 MG tablet Take 1 tablet (0.5 mg total) by mouth every evening. 22 Yes April Horton MD   oxyCODONE (ROXICODONE) 5 MG immediate release tablet Take 1 tablet (5 mg total) by mouth every 4 (four) hours as needed for Pain. 22  Yes Aleksander Phan MD   promethazine (PHENERGAN) 25 MG tablet TAKE 1 TABLET (25 MG TOTAL) BY MOUTH 2 (TWO) TIMES DAILY AS NEEDED FOR NAUSEA. 22  Yes April Horton MD   rOPINIRole (REQUIP) 0.5 MG tablet Take 0.5 mg by mouth every 8 (eight) hours. 22  Yes Historical Provider   sertraline (ZOLOFT) 50 MG tablet Take 1 tablet (50 mg total) by mouth every evening. Take 50 mg by mouth every evening. 22  Yes April  "MD Sol   traMADoL (ULTRAM) 50 mg tablet Take 1 tablet (50 mg total) by mouth every 6 (six) hours as needed. 8/15/22  Yes April Horton MD   zinc gluconate 50 mg tablet Take 50 mg by mouth once daily.   Yes Historical Provider   hydrALAZINE (APRESOLINE) 50 MG tablet Take 1 tablet (50 mg total) by mouth every 8 (eight) hours.  Patient not taking: Reported on 9/15/2022 8/29/22 8/29/23  April Horton MD   miconazole (MICOTIN) 2 % cream Apply topically 2 (two) times daily. 1/26/22 4/30/22  Shemar Hernandez MD         PHYSICAL EXAM:   BP (!) 112/55   Pulse 84   Temp 98.3 °F (36.8 °C)   Resp 16   Ht 5' 7" (1.702 m)   Wt 70.8 kg (156 lb)   SpO2 96%   BMI 24.43 kg/m²   Vitals Reviewed  General appearance: non-toxic and not acutely ill-appearingWhite female in no apparent distress.  Skin: No Rash.   Neuro: Motor and sensory exams grossly intact. Good tone. Power in all 4 extremities 5/5.   HENT: Atraumatic head. Moist mucous membranes of oral cavity.  Eyes: Normal extraocular movements.   Neck: Supple. No evidence of lymphadenopathy. No thyroidomegaly.  Lungs: Clear to auscultation bilaterally. No wheezing present.   Heart: Regular rate and rhythm. S1 and S2 present with no murmurs/gallop/rub. No pedal edema. No JVD present.   Abdomen: Soft, non-distended, non-tender. No rebound tenderness/guarding. No masses or organomegaly. Bowel sounds are normal. Bladder is not palpable.   Extremities: No cyanosis, clubbing.  Psych/mental status: Alert and oriented. Cooperative. Responds appropriately to questions.   EMERGENCY DEPARTMENT LABS AND IMAGING:     Labs Reviewed   COMPREHENSIVE METABOLIC PANEL - Abnormal; Notable for the following components:       Result Value    BUN 26 (*)     Creatinine 4.7 (*)     Albumin 2.2 (*)     Alkaline Phosphatase 192 (*)     eGFR 9.3 (*)     All other components within normal limits   CBC W/ AUTO DIFFERENTIAL - Abnormal; Notable for the following components:    RBC 3.02 (*)     " Hemoglobin 10.1 (*)     Hematocrit 32.5 (*)      (*)     MCH 33.4 (*)     MCHC 31.1 (*)     RDW 18.2 (*)     Immature Granulocytes 1.5 (*)     Gran # (ANC) 9.1 (*)     Immature Grans (Abs) 0.16 (*)     Gran % 82.8 (*)     Lymph % 9.1 (*)     All other components within normal limits   PROTIME-INR - Abnormal; Notable for the following components:    PT 20.5 (*)     All other components within normal limits   TROPONIN I - Abnormal; Notable for the following components:    Troponin I 0.045 (*)     All other components within normal limits   MAGNESIUM   CK       X-ray Chest AP Portable   Final Result      X-Ray Elbow Complete Left   Final Result      CT Head Without Contrast   Final Result          ASSESSMENT & PLAN:   Shara Hutchins is a 72 y.o. female admitted for    Fall likely due to orthostatic hypotension  Generalize weakness  Deconditioning  Dehydration due to diarrhea  Ischemic colitis s/p laprotomy with ischemic sigmoid colon, sigmoid rectum stricture  ESRD on dialysis MWF  Anemia of renal disease  Non specific troponin elevation  Hypothyroidism      Plan  Admit to Med-Surg Floor with remote telemetry, Cardiac Monitoring, obtain orthostatic vitals, Nephrology consultation, PT/OT evaluation, resume home medications, and falls precautions    Diet: Diabetic    DVT Prophylaxis: heparin and Encourage ambulation. OOB as tolerated.     Discharge Planning and Disposition:  Home with assistance from family    Expected LOS: 1-2 days    ________________________________________________________________________________    Face-to-Face encounter date: 09/15/2022  Encounter included review of the medical records, interviewing and examining the patient face-to-face, discussion with family and other health care providers including emergency medicine physician, admission orders, interpreting lab/test results and formulating a plan of care.   Medical Decision Making during this encounter was High Complexity due to Patient has  a condition that poses threat to life and bodily function:  fall, deconditioning, ESRD requiring HD  ________________________________________________________________________________    INPATIENT LIST OF MEDICATIONS     Current Facility-Administered Medications:     acetaminophen tablet 1,000 mg, 1,000 mg, Oral, Q8H PRN, Dalton Hammond MD    acetaminophen tablet 650 mg, 650 mg, Oral, Q4H PRN, Dalton Hammond MD    aluminum-magnesium hydroxide-simethicone 200-200-20 mg/5 mL suspension 30 mL, 30 mL, Oral, QID PRN, Dalton Hammond MD    dextrose 10% bolus 125 mL, 12.5 g, Intravenous, PRN, Dalton Hammond MD    dextrose 10% bolus 250 mL, 25 g, Intravenous, PRN, Dalton Hammond MD    glucagon (human recombinant) injection 1 mg, 1 mg, Intramuscular, PRN, Dalton Hammond MD    glucose chewable tablet 16 g, 16 g, Oral, PRN, Dalton Hammond MD    glucose chewable tablet 24 g, 24 g, Oral, PRN, Dalton Hammond MD    HYDROcodone-acetaminophen 5-325 mg per tablet 1 tablet, 1 tablet, Oral, Q6H PRN, Dalton Hammond MD    melatonin tablet 9 mg, 9 mg, Oral, Nightly PRN, Dalton Hammond MD    morphine injection 4 mg, 4 mg, Intravenous, Q4H PRN, Dalton Hammond MD    naloxone 0.4 mg/mL injection 0.02 mg, 0.02 mg, Intravenous, PRN, Dalton Hammond MD    ondansetron injection 4 mg, 4 mg, Intravenous, Q6H PRN, Dalton Hammond MD    prochlorperazine injection Soln 5 mg, 5 mg, Intravenous, Q6H PRN, Dalton Hammond MD    simethicone chewable tablet 80 mg, 1 tablet, Oral, QID PRN, Dalton Hammond MD    sodium chloride 0.9% flush 10 mL, 10 mL, Intravenous, PRN, Fran Ford MD    sodium chloride 0.9% flush 10 mL, 10 mL, Intravenous, Q6H PRN, Dalton Hammond MD    Current Outpatient Medications:     cholecalciferol, vitamin D3, (VITAMIN D3) 25 mcg (1,000 unit) capsule, Take 1 capsule (1,000 Units total) by mouth once daily., Disp: 10 capsule, Rfl: 0    diltiaZEM (CARDIZEM CD)  120 MG Cp24, Take 1 capsule (120 mg total) by mouth once daily., Disp: 90 capsule, Rfl: 3    ferric citrate (AURYXIA) 210 mg iron Tab, Take 2 tablets by mouth 3 (three) times daily with meals., Disp: , Rfl:     gabapentin (NEURONTIN) 100 MG capsule, Take 100 mg by mouth 2 (two) times daily. HCS, Disp: , Rfl:     levothyroxine (SYNTHROID) 88 MCG tablet, TAKE 1 TABLET BEFORE BREAKFAST (Patient taking differently: Take 88 mcg by mouth before breakfast. TAKE 1 TABLET BEFORE BREAKFAST), Disp: 90 tablet, Rfl: 3    LORazepam (ATIVAN) 0.5 MG tablet, Take 1 tablet (0.5 mg total) by mouth every evening., Disp: 90 tablet, Rfl: 1    oxyCODONE (ROXICODONE) 5 MG immediate release tablet, Take 1 tablet (5 mg total) by mouth every 4 (four) hours as needed for Pain., Disp: 20 tablet, Rfl: 0    promethazine (PHENERGAN) 25 MG tablet, TAKE 1 TABLET (25 MG TOTAL) BY MOUTH 2 (TWO) TIMES DAILY AS NEEDED FOR NAUSEA., Disp: 60 tablet, Rfl: 0    rOPINIRole (REQUIP) 0.5 MG tablet, Take 0.5 mg by mouth every 8 (eight) hours., Disp: , Rfl:     sertraline (ZOLOFT) 50 MG tablet, Take 1 tablet (50 mg total) by mouth every evening. Take 50 mg by mouth every evening., Disp: 90 tablet, Rfl: 1    traMADoL (ULTRAM) 50 mg tablet, Take 1 tablet (50 mg total) by mouth every 6 (six) hours as needed., Disp: 30 tablet, Rfl: 0    zinc gluconate 50 mg tablet, Take 50 mg by mouth once daily., Disp: , Rfl:     hydrALAZINE (APRESOLINE) 50 MG tablet, Take 1 tablet (50 mg total) by mouth every 8 (eight) hours. (Patient not taking: Reported on 9/15/2022), Disp: 270 tablet, Rfl: 0    Facility-Administered Medications Ordered in Other Encounters:     electrolyte-S (ISOLYTE), , Intravenous, Continuous, Raz Ruff MD    lactated ringers infusion, , Intravenous, Continuous, Ruy Beltre MD      Scheduled Meds:  Continuous Infusions:  PRN Meds:.acetaminophen, acetaminophen, aluminum-magnesium hydroxide-simethicone, dextrose 10%, dextrose 10%, glucagon (human  recombinant), glucose, glucose, HYDROcodone-acetaminophen, melatonin, morphine, naloxone, ondansetron, prochlorperazine, simethicone, sodium chloride 0.9%, sodium chloride 0.9%      Dalton Hammond  [facility] Hospitalist  09/15/2022
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
## 2022-09-16 NOTE — PROGRESS NOTES
HD tx tolerated well  Pt kept even per order  Pt educated regarding access maintenance       09/16/22 1310   Handoff Report   Received From Kaylee Lu   Given To Nehemiahtereso   Vital Signs   Temp 98.1 °F (36.7 °C)   Temp src Oral   Pulse 75   Resp 18   BP (!) 120/54   Assessments (Pre/Post)   Blood Liters Processed (BLP) 51.1   Transport Modality bed   Level of Consciousness (AVPU) alert   Dialyzer Clearance moderately streaked        Hemodialysis AV Fistula Left upper arm   No Placement Date or Time found.   Present Prior to Hospital Arrival?: Yes  Location: Left upper arm   Site Assessment Clean;Dry;Intact   Patency Present;Thrill;Bruit   Status Deaccessed   Flows Good   Dressing Intervention First dressing   Dressing Status Clean;Dry;Intact   Site Condition No complications   Dressing Gauze   Post-Hemodialysis Assessment   Rinseback Volume (mL) 250 mL   Blood Volume Processed (Liters) 51.1 L   Dialyzer Clearance Moderately streaked   Duration of Treatment 180 minutes   Additional Fluid Intake (mL) 500 mL   Total UF (mL) 500 mL   Net Fluid Removal 0   Patient Response to Treatment tolerated well   Post-Treatment Weight 70.8 kg (156 lb 1.4 oz)   Treatment Weight Change 0   Arterial bleeding stop time (min) 5 min   Venous bleeding stop time (min) 5 min   Post-Hemodialysis Comments Tx complete, all blood returned per protocol, pt stable

## 2022-09-16 NOTE — PLAN OF CARE
09/16/22 1501   Readmission   Why were you hospitalized in the last 30 days? Ischemic colitis s/p laprotomy with ischemic sigmoid colon, sigmoid rectum stricture   Why were you readmitted? New medical problem   When you left the hospital how did you feel? Pt reported she felt good   When you left the hospital where did you go? Home Alone   Did patient/caregiver refused recommended DC plan? No   Tell me about what happened between when you left the hospital and the day you returned. Pt experienced blackout and fall at home ([employer])   When did you start not feeling well? Sudden blackout at home   Did you try to manage your symptoms your self? No   Did you call anyone? No   Why? Pt did not have access to phone after falling. Was found by [employer]  morning of 9/15.   Did you try to see or did see a doctor or nurse before you came? No   Why? Hospital readmit after being discharged 1 day   Did you have  a follow-up appointment on discharge? No   Was this a planned readmission? No

## 2022-09-16 NOTE — PT/OT/SLP PROGRESS
Occupational Therapy      Patient Name:  Shara Hutchins   MRN:  [id number]    Patient not seen today secondary to Dialysis (x 3 attempts ( two in AM and one in PM)). Will follow-up next service date.    9/16/2022

## 2022-09-16 NOTE — PROGRESS NOTES
[facility]    Progress Note    Patient Name: Shara Hutchins  MRN: [id number]  Patient Class: OP- Observation   Admission Date: 9/15/2022  9:53 AM  Length of Stay: 0  Attending Physician: Brenda Stewart MD  Primary Care Provider: April Horton MD  Face-to-Face encounter date: 09/16/2022  Code status:  Chief Complaint: Fall        Subjective:    HPI:Shara Hutchins is a 72 y.o. White female with known history of Ischemic colitis s/p laprotomy with ischemic sigmoid colon, sigmoid rectum stricture who was discharged yesterday to [facility] presented again with a fall. Patient reports after being discharged she went there and slept. Later she woke up and went to bathroom to clean her ostomy bad and do wound care. When she was returning back, she felt light headed and fell.She did not have her emergency alert necklace and no one checked on her overnight. She laid on the floor overnight and dragged herself out of th bathroom to living room all covered with stools. This morning, facility staff found her and call EMS.In the ER, her vitals and labs are stable. She reports severe weakness and abdominal pain. Patient denies change in vision, hearing, headache, fever, cough, congestion, runny nose, chest pain, shortness of breath, palpitations,  diarrhea, constipation, vomiting, dysuria, hematuria, joint pain and back pain. Hospital medicine consulted for admission.    Interval History:   9/16: Patient visibly weak and short of breath, patient may be a candidate for SNF.  PT/OT recommendations pending.  Home O2 evaluation.  Consult to Case Management .  Check iron, vitamin B12 and folate levels.  No concerns/issues overnight reported by the patient or the nursing staff.    Review of Systems All other Review of Systems were found to be negative expect for that mentioned already in HPI.     Objective:     Vitals:    09/16/22 1000 09/16/22 1005 09/16/22 1030 09/16/22 1100   BP: (!) 101/52 (!)  102/51 (!) 95/51 (!) 90/44   BP Location: Right arm      Patient Position: Lying      Pulse: 75 74 76 76   Resp: 18      Temp: 98.2 °F (36.8 °C)      TempSrc: Oral      SpO2: 100%      Weight:       Height:            Vitals reviewed.  Constitutional: No distress.   HENT: NC  Head: Atraumatic.   Cardiovascular: Normal rate, regular rhythm and normal heart sounds.   Pulmonary/Chest: Effort normal. No wheezes.   Abdominal: Soft. Bowel sounds are normal. No distension and no mass. No tenderness, colostomy and nephrostomy tube in-situ  Neurological: Alert.   Skin: Skin is warm and dry.   Psych: Appropriate mood and affect    Following labs were Reviewed   CBC:  Recent Labs   Lab 09/16/22  0741   WBC 9.04   HGB 8.8*   HCT 29.2*        CMP:  Recent Labs   Lab 09/16/22  0741   CALCIUM 8.3*      K 3.7   CO2 23      BUN 36*   CREATININE 5.9*       Micro Results  Microbiology Results (last 7 days)       ** No results found for the last 168 hours. **             Radiology Reports  X-Ray Elbow Complete Left    Result Date: 9/15/2022  HISTORY: Left elbow pain post fall. FINDINGS: 4 views of the left elbow show no acute fracture, dislocation or destructive osseous lesion. The elbow joint spaces are preserved, with no evidence of an elbow joint effusion. The bones are diffusely osteopenic. There are scattered vascular surgical clips and upper arm endovascular stent. IMPRESSION: Negative for acute fracture or dislocation. Electronically signed by:  Mike Martinez MD  9/15/2022 11:14 AM CDT Workstation: [id number]    X-Ray Elbow Complete Left    Result Date: 8/27/2022  Four views left elbow Clinical history is pain after fall There are no fractures, dislocations or acute osseous abnormalities. The joint spaces are maintained. There are surgical clips in the anterior medial soft tissues adjacent to the distal humerus. IMPRESSION: No evidence of fracture Electronically signed by:  Meredith Colunga MD  8/27/2022 10:24  AM CDT Workstation: [id number]    CT Head Without Contrast    Result Date: 9/15/2022  CMS MANDATED QUALITY DATA - CT RADIATION - 436 All CT scans at this facility utilize dose modulation, iterative reconstruction, and/or weight based dosing when appropriate to reduce radiation dose to as low as reasonably achievable. Reason: Head trauma, intracranial venous injury suspected Fell this am no loc TECHNIQUE: Head CT without IV contrast. COMPARISON: CT head August 27, 2022 FINDINGS: Gray-white differentiation is maintained without hemorrhage, midline shift, or mass effect. The ventricles and cisterns are maintained. Calvarium is intact. Visualized sinuses are clear. IMPRESSION: No acute intracranial abnormality. Electronically signed by:  Elias Sherwood DO  9/15/2022 11:03 AM CDT Workstation: [id number]    CT Head Without Contrast    Result Date: 8/27/2022  All CT scans at this facility used dose modulation, iterative reconstruction and/or weight-based dosing when appropriate to reduce radiation doses  as low as reasonably achievable. CLINICAL INFORMATION:  FALL FINDINGS:   The ventricles and sulci are normal in size and configuration for age.  There is no intraparenchymal hemorrhage, mass or midline shift.  There are no extra-axial fluid collections.  The paranasal sinus and mastoid air cells are clear. IMPRESSION:   NO ACUTE INTRACRANIAL PROCESS. Electronically signed by:  Meredith Colunga MD  8/27/2022 9:50 AM CDT Workstation: [id number]    CT Cervical Spine Without Contrast    Result Date: 8/27/2022  All CT scans at this facility used dose modulation, iterative reconstruction and/or weight-based dosing when appropriate to reduce radiation doses  as low as reasonably achievable. CT scan of the cervical spine Clinical history is FALL Axial images the cervical spine were obtained with sagittal and coronal reconstructed images. There is reversal of normal cervical lordosis. There is been instrumented anterior  cervical fusion at C4-5 with cortical plate and fixating screws. There is interbody disc expander at C4-5 with bony bridging. There is bony fusion at C5-6 and C6-7. There is mild anterolisthesis of C7 on T1 with disc space narrowing. There has been posterior fusion on the right at C6-7 with bony fusion of the right facet. There is bony fusion of the right facet at C4-5 and C5-6 that of the left facet at C4-5. There is disc space narrowing and anterior spurring at C2-3. The odontoid process is intact and the lateral masses of C1 are symmetrical. There are no fractures or acute osseous abnormalities. The facet joints are aligned. The paraspinous soft tissues are normal. There is no epidural fluid collection. The lung apices are clear. IMPRESSION: No evidence of fracture or acute osseous abnormality Prior anterior and posterior fusion Mild anterolisthesis of C7 on T1 with disc space narrowing Degenerative changes at C2-3 Electronically signed by:  Meredith Colunga MD  8/27/2022 9:54 AM CDT Workstation: [id number]    X-ray Chest AP Portable    Result Date: 9/15/2022  Reason: Chest pain. FINDINGS: Portable chest with comparison chest x-ray September 7, 2022 show normal cardiomediastinal silhouette. Lungs are clear. Pulmonary vasculature is normal. No acute osseous abnormality. IMPRESSION: No acute cardiopulmonary abnormality. Electronically signed by:  Elias Sherwood DO  9/15/2022 11:12 AM CDT Workstation: [id number]    X-Ray Chest AP Portable    Result Date: 9/7/2022  XR CHEST 1 VIEW CLINICAL HISTORY: 72 years Female Sepsis Comparison is made to prior study at 9:56 AM FINDINGS: Cardiomediastinal silhouette is within normal limits. Lungs are normally expanded with no airspace consolidation. No pleural effusion or pneumothorax. No acute osseous abnormality. There is been prior cervical fusion. IMPRESSION: No acute pulmonary process. Electronically signed by:  Meredith Colunga MD  9/7/2022 12:18 PM CDT Workstation:  [id number]    X-ray Chest AP Portable    Result Date: 9/7/2022  Chest single view Clinical data:Nausea, vomiting. Comparison to July 7. FINDINGS: AP view of the chest demonstrates no cardiac, pulmonary, or osseous abnormalities. IMPRESSION: 1. Normal chest single view. Electronically signed by:  Dixon Thomas MD  9/7/2022 10:24 AM CDT Workstation: [id number]    US Carotid Bilateral    Result Date: 9/15/2022  CMS MANDATED QUALITY DATA - CAROTID - 195 All measurements and percent stenosis described below were determined using NASCET criteria or criteria similar to NASCET, as defined by the Society of Radiologists in Ultrasound Consensus Conference, Radiology, 2003 Bilateral carotid doppler evaluation HISTORY: Dizziness, carotid stenosis. Grey scale, duplex and color doppler interrogation are performed and interpreted in accordance with NASCET criteria. Scattered atheromatous changes are present within the extracranial carotid circulation. Duplex and color Doppler interrogation fail to demonstrate evidence of hemodynamically significant stenosis on either side. Antegrade flow is observed within both vertebral arteries. IMPRESSION: Scattered bilateral atheromatous changes without evidence of hemodynamically significant carotid arterial stenosis. Electronically signed by:  Janneth Collazo MD  9/15/2022 2:33 PM CDT Workstation: [id number]    US Hemodialysis Access    Result Date: 9/12/2022  REASON: stenosis TECHNIQUE: Grayscale and color Doppler ultrasound of the left upper extremity vasculature. COMPARISON: Ultrasound January 17, 2022. FINDINGS: Left AV fistula noted with patent anastomosis is patent. Elevated peak systolic velocities at the proximal anastomosis are similar to previous exam, measuring up to 462.2 cm/s. No vascular occlusions observed. IMPRESSION: As above. Electronically signed by:  Elias Sherwood DO  9/12/2022 4:44 PM CDT Workstation: [id number]    SURG FL Surgery Fluoro Usage    Result Date:  8/30/2022  See OP Notes for results. IMPRESSION: See OP Notes for results. This procedure was auto-finalized by: Virtual Radiologist    CT Abdomen Pelvis  Without Contrast    Result Date: 9/7/2022  CMS MANDATED QUALITY DATA - CT RADIATION - 436 All CT scans at this facility utilize dose modulation, iterative reconstruction, and/or weight based dosing when appropriate to reduce radiation dose to as low as reasonably achievable. HISTORY: GI bleed, abdominal pain COMPARISON: 7/7/2022 FINDINGS: Noncontrast axial images were obtained. Nonenhanced study is tailored for the detection of urolithiasis, and is insensitive for abnormalities of the solid organs, vasculature and hollow viscera. CT ABDOMEN: There is coronary artery calcification. The lung bases are clear. There is moderate portal venous air within the intrahepatic portal veins and main portal vein. There is diffuse wall thickening of the colon with pneumatosis within the descending colon. Findings are suggestive of ischemic colitis. There is air within a vein within the left lower quadrant. There is edema within the pericolonic fat. The small bowel is not dilated. There are anastomotic sutures in the right lower quadrant. The liver, spleen and pancreas have a normal noncontrast appearance. The gallbladder is absent. The adrenal glands are normal. The kidneys are small in size with numerous nonobstructing renal stones. There is a right ureteral stent in place. There is resolution of the right hydronephrosis. There is moderate vascular calcification of the aorta and its branches. There is no mesenteric or retroperitoneal adenopathy. There is levoscoliosis of the lumbar spine with multilevel degenerative changes. There are degenerative changes of both hips. CT PELVIS: There is pneumatosis within the descending colon and sigmoid colon with diffuse wall thickening of the colon suggestive of ischemic colitis. There is minimal free fluid in the pelvis. The bladder is  normal. The uterus is absent. There is moderate vascular calcification of the iliac arteries. IMPRESSION: Portal venous air with pneumatosis within the descending colon and sigmoid colon with diffuse wall thickening of the colon. Findings are suggestive of ischemic colitis. There is no free air Minimal free fluid within the pelvis Bilateral renal calculi with right ureteral stent in place with resolution of the right hydronephrosis Moderate vascular calcification of the aorta and iliac arteries These findings were called to Dr. rausch in the emergency department at 11:45 AM Electronically signed by:  Meredith Colunga MD  9/7/2022 11:50 AM CDT Workstation: [id number]       Meds  Scheduled Meds:   epoetin nica-ebpx (RETACRIT) injection  50 Units/kg Intravenous Every Mon, Wed, Fri     Continuous Infusions:   sodium chloride 0.9%       PRN Meds:.acetaminophen, acetaminophen, aluminum-magnesium hydroxide-simethicone, dextrose 10%, dextrose 10%, glucagon (human recombinant), glucose, glucose, HYDROcodone-acetaminophen, melatonin, morphine, naloxone, ondansetron, prochlorperazine, simethicone, sodium chloride 0.9%, sodium chloride 0.9%.    Assessment & Plan:     Active Hospital Problems    Diagnosis    Fall    ESRD on dialysis MWF     Plan:   - vitamin B12, iron and B12 levels  - continue to hold blood pressure medications  - PT/OT  - case management consult for SNF placement  - nephrology on board      Discharge Planning:   Is the patient medically ready for discharge?: no    Reason for patient still in hospital (select all that apply): Patient trending condition and Treatment    Above encounter included review of the medical records, interviewing and examining the patient face-to-face, discussion with family and other health care providers, ordering and interpreting lab/test results and formulating a plan of care.     Medical Decision Making:      [_] Low Complexity  [_] Moderate Complexity  [x] High  Complexity      Brenda Stewart MD  Department of Hospital Medicine   Formerly [facility], later [facility]

## 2022-09-16 NOTE — PLAN OF CARE
[facility]  Discharge Assessment    Primary Care Provider: April Horton MD      met with Pt at bedside to confirm discharge assessment.  is familiar with patient and performed discharge assessment during hospitalization on starting on 9/7.  discussed skilled-nursing. Pt agreeable to skilled nursing placement.  sent secure chat to Anita to review Pt.  to follow.    Discharge Assessment (most recent)       BRIEF DISCHARGE ASSESSMENT - 09/16/22 3126          Discharge Planning    Assessment Type Discharge Planning Brief Assessment     Resource/Environmental Concerns other (see comments)     Support Systems Children     Equipment Currently Used at Home other (see comments)   Pt has rolling walker but does not use it    Current Living Arrangements independent/assisted living facility     Care Facility Name [facility]     Patient/Family Anticipates Transition to other (see comments)   skilled nursing    Patient/Family Anticipated Services at Transition rehabilitation services;respiratory services;skilled nursing     DME Needed Upon Discharge  none     Discharge Plan A Skilled Nursing Facility     Discharge Plan B Skilled Nursing Facility

## 2022-09-16 NOTE — PLAN OF CARE
Problem: Adult Inpatient Plan of Care  Goal: Plan of Care Review  Outcome: Ongoing, Progressing  Goal: Patient-Specific Goal (Individualized)  Outcome: Ongoing, Progressing  Goal: Absence of Hospital-Acquired Illness or Injury  Outcome: Ongoing, Progressing  Goal: Optimal Comfort and Wellbeing  Outcome: Ongoing, Progressing  Goal: Readiness for Transition of Care  Outcome: Ongoing, Progressing     Problem: Adjustment to Illness (Sepsis/Septic Shock)  Goal: Optimal Coping  Outcome: Ongoing, Progressing     Problem: Bleeding (Sepsis/Septic Shock)  Goal: Absence of Bleeding  Outcome: Ongoing, Progressing     Problem: Glycemic Control Impaired (Sepsis/Septic Shock)  Goal: Blood Glucose Level Within Desired Range  Outcome: Ongoing, Progressing     Problem: Infection Progression (Sepsis/Septic Shock)  Goal: Absence of Infection Signs and Symptoms  Outcome: Ongoing, Progressing     Problem: Nutrition Impaired (Sepsis/Septic Shock)  Goal: Optimal Nutrition Intake  Outcome: Ongoing, Progressing     Problem: Fluid and Electrolyte Imbalance (Acute Kidney Injury/Impairment)  Goal: Fluid and Electrolyte Balance  Outcome: Ongoing, Progressing     Problem: Oral Intake Inadequate (Acute Kidney Injury/Impairment)  Goal: Optimal Nutrition Intake  Outcome: Ongoing, Progressing     Problem: Renal Function Impairment (Acute Kidney Injury/Impairment)  Goal: Effective Renal Function  Outcome: Ongoing, Progressing     Problem: Fluid Imbalance (Pneumonia)  Goal: Fluid Balance  Outcome: Ongoing, Progressing     Problem: Infection (Pneumonia)  Goal: Resolution of Infection Signs and Symptoms  Outcome: Ongoing, Progressing     Problem: Respiratory Compromise (Pneumonia)  Goal: Effective Oxygenation and Ventilation  Outcome: Ongoing, Progressing     Problem: Impaired Wound Healing  Goal: Optimal Wound Healing  Outcome: Ongoing, Progressing     Problem: Skin Injury Risk Increased  Goal: Skin Health and Integrity  Outcome: Ongoing,  Progressing

## 2022-09-16 NOTE — PT/OT/SLP EVAL
Physical Therapy Evaluation    Patient Name:  Shara Hutchins   MRN:  [id number]    Recommendations:     Discharge Recommendations:  nursing facility, skilled, home health PT (dependent on medical status and progress towards goals)   Discharge Equipment Recommendations: walker, rolling   Barriers to discharge:  increase assist with mobility    Assessment:     Shara Hutchins is a 72 y.o. female admitted with a medical diagnosis of <principal problem not specified>.  She presents with the following impairments/functional limitations:  weakness, impaired endurance, impaired self care skills, impaired functional mobility, gait instability, impaired balance, decreased lower extremity function, decreased safety awareness, impaired cardiopulmonary response to activity.    Pt found in bed on 3L o2 via NC. SAO2 100%. Decreased to 2L O2 via NC. 98% at rest and with bed mobility. Initial sit to stand limited due to dizziness/lightheadedness. Improved with second attempt allowing ambulation x 20 ft with RW and min A. Toward end of ambulation reports dizziness/lightheadedness. SAO2 RA 92%, BP WFL throughout session. Pt returned to 2L o2 via NC at end of session    Rehab Prognosis: Fair; patient would benefit from acute skilled PT services to address these deficits and reach maximum level of function.    Recent Surgery: * No surgery found *      Plan:     During this hospitalization, patient to be seen 6 x/week to address the identified rehab impairments via therapeutic activities, therapeutic exercises, gait training, neuromuscular re-education and progress toward the following goals:    Plan of Care Expires:  10/16/22    Subjective     Chief Complaint: dizziness/lightheadedness  Patient/Family Comments/goals: get better  Pain/Comfort:  Pain Rating 1: 0/10    Patients cultural, spiritual, Advent conflicts given the current situation: no    Living Environment:  Pt lives in assisted living. They provided meals. She manages her own  meds. Dialysis MWF, Independent with colostomy and nephrostomy care, no O2 at home  Prior to admission, patients level of function was Independent no AD.  Equipment used at home: none.  DME owned (not currently used): rolling walker.  Upon discharge, patient will have assistance from facility caregivers.    Objective:     Communicated with RN prior to session.  Patient found HOB elevated with peripheral IV, colostomy, nephrostomy, oxygen, bed alarm  upon PT entry to room.    General Precautions: Standard, fall, contact   Orthopedic Precautions:N/A   Braces: N/A  Respiratory Status: Nasal cannula, flow 3 L/min    Exams:  Cognitive Exam:  Patient is oriented to Person, Place, Time, and Situation  RLE ROM: WFL  RLE Strength: 4-/5  LLE ROM: WFL  LLE Strength: 4-/5    Functional Mobility:  Bed Mobility:     Supine to Sit: contact guard assistance  Transfers:     Sit to Stand:  contact guard assistance with rolling walker  Gait: 20 ft with RW and min A    Therapeutic Activities and Exercises:   Pt educated on POC, discharge recommendation, importance of time OOB, proper form with mobility, effect of dizziness/lightheadedness on mobility, need for assist with mobility, use of call bell to seek assistance as needed and fall prevention      AM-PAC 6 CLICK MOBILITY  Total Score:16     Patient left sitting edge of bed with all lines intact, call button in reach, and RN present.    GOALS:   Multidisciplinary Problems       Physical Therapy Goals          Problem: Physical Therapy    Goal Priority Disciplines Outcome Goal Variances Interventions   Physical Therapy Goal     PT, PT/OT Ongoing, Progressing     Description: Goals to be met by: 10/16/22     Patient will increase functional independence with mobility by performin. Supine to sit with Supervision  2. Sit to stand transfer with Supervision  3. Bed to chair transfer with Supervision using least restrictive assistive device  4. Gait  x 150 feet with Supervision  using  least restrictive assistive device                             History:     Past Medical History:   Diagnosis Date    Cellulitis of trunk     Perineum, numerous episodes    Chronic diarrhea 02/10/2020    Short-gut syndrome    End stage renal disease on dialysis 01/07/2018    Fatigue     Hypertension     Hypothyroidism 02/10/2020    Iron deficiency anemia due to chronic blood loss 01/07/2018    Pulmonary hypertension 03/20/2020    Vulvar cancer 01/07/2019       Past Surgical History:   Procedure Laterality Date    ABDOMINAL SURGERY      ANGIOGRAPHY OF ARTERIOVENOUS SHUNT N/A 01/19/2022    Procedure: Fistulogram with Possible Intervention;  Surgeon: Raz Maher MD;  Location: [facility] CATH/EP LAB;  Service: Cardiothoracic;  Laterality: N/A;    APPENDECTOMY      AV FISTULA PLACEMENT Left 2018    BACK SURGERY      BLADDER FULGURATION N/A 06/16/2020    Procedure: FULGURATION, BLADDER;  Surgeon: Damari Barber Jr., MD;  Location: [facility] OR;  Service: Urology;  Laterality: N/A;    carpel tunnel surgery once on each hand      CERVICAL FUSION      x 4    cervix repair      CHOLECYSTECTOMY  2000    COLECTOMY      CYSTOSCOPY W/ RETROGRADES  06/16/2020    Procedure: CYSTOSCOPY, WITH RETROGRADE PYELOGRAM;  Surgeon: Damari Barber Jr., MD;  Location: [facility] OR;  Service: Urology;;    CYSTOSCOPY W/ RETROGRADES Right 04/20/2021    Procedure: CYSTOSCOPY, WITH RETROGRADE PYELOGRAM;  Surgeon: Damari Barber Jr., MD;  Location: [facility] OR;  Service: Urology;  Laterality: Right;    CYSTOSCOPY W/ URETERAL STENT PLACEMENT Right 03/16/2021    Procedure: CYSTOSCOPY, WITH URETERAL STENT INSERTION;  Surgeon: Damari Barber Jr., MD;  Location: [facility] OR;  Service: Urology;  Laterality: Right;    CYSTOSCOPY W/ URETERAL STENT PLACEMENT Right 05/18/2021    Procedure: CYSTOSCOPY, WITH URETERAL STENT INSERTION;  Surgeon: Damari Barber Jr., MD;  Location: [facility] OR;  Service: Urology;  Laterality: Right;    CYSTOSCOPY W/ URETERAL STENT PLACEMENT Right  06/22/2022    Procedure: CYSTOSCOPY, WITH URETERAL STENT INSERTION;  Surgeon: Gaby Garcia MD;  Location: [facility] OR;  Service: Urology;  Laterality: Right;  cysto stent exchange    CYSTOSCOPY W/ URETERAL STENT REMOVAL Right 04/20/2021    Procedure: CYSTOSCOPY, WITH URETERAL STENT REMOVAL;  Surgeon: Damari Barber Jr., MD;  Location: [facility] OR;  Service: Urology;  Laterality: Right;    CYSTOSCOPY WITH URETEROSCOPY, RETROGRADE PYELOGRAPHY, AND INSERTION OF STENT Right 03/30/2021    Procedure: CYSTOSCOPY, WITH RETROGRADE PYELOGRAM AND URETERAL STENT INSERTION;  Surgeon: Damari Barber Jr., MD;  Location: [facility] OR;  Service: Urology;  Laterality: Right;    CYSTOURETEROSCOPY WITH RETROGRADE PYELOGRAPHY AND INSERTION OF STENT INTO URETER Right 01/18/2022    Procedure: CYSTOURETEROSCOPY, WITH RETROGRADE PYELOGRAM AND URETERAL STENT INSERTION;  Surgeon: Damari Barber Jr., MD;  Location: [facility] OR;  Service: Urology;  Laterality: Right;    CYSTOURETEROSCOPY WITH RETROGRADE PYELOGRAPHY AND INSERTION OF STENT INTO URETER Right 8/30/2022    Procedure: CYSTOURETEROSCOPY, WITH RETROGRADE PYELOGRAM AND URETERAL STENT INSERTION;  Surgeon: Damari Barber Jr., MD;  Location: [facility] OR;  Service: Urology;  Laterality: Right;    DIAGNOSTIC LAPAROSCOPY N/A 02/13/2020    Procedure: LAPAROSCOPY, DIAGNOSTIC;  Surgeon: Robbi Lovell III, MD;  Location: [facility];  Service: General;  Laterality: N/A;    HYSTERECTOMY  1980    ILEOSTOMY N/A 02/13/2020    Procedure: CREATION, ILEOSTOMY Loop;  Surgeon: Robbi Lovell III, MD;  Location: [facility] OR;  Service: General;  Laterality: N/A;    ILEOSTOMY CLOSURE N/A 05/18/2020    Procedure: CLOSURE, ILEOSTOMY;  Surgeon: Robbi Lovell III, MD;  Location: [facility] OR;  Service: General;  Laterality: N/A;    LASER LITHOTRIPSY Right 03/30/2021    Procedure: LITHOTRIPSY, USING LASER;  Surgeon: Damari Barber Jr., MD;  Location: [facility] OR;  Service: Urology;  Laterality: Right;    LUMBAR LAMINECTOMY       LYSIS OF ADHESIONS N/A 02/13/2020    Procedure: LYSIS, ADHESIONS;  Surgeon: Robbi Lovell III, MD;  Location: [facility] OR;  Service: General;  Laterality: N/A;    NECK SURGERY      OSTEOTOMY  9/7/2022    Procedure: OSTEOTOMY;  Surgeon: Brigette Chavez MD;  Location: [facility] OR;  Service: General;;    PERCUTANEOUS TRANSLUMINAL ANGIOPLASTY OF ARTERIOVENOUS FISTULA N/A 01/19/2022    Procedure: PTA, AV FISTULA;  Surgeon: Raz Maher MD;  Location: [facility] CATH/EP LAB;  Service: Cardiothoracic;  Laterality: N/A;    PHLEBOGRAPHY  02/28/2020    Procedure: VENOGRAM;  Surgeon: Robbi Lovell III, MD;  Location: [facility] OR;  Service: General;;    REMOVAL OF VASCULAR ACCESS PORT Right 02/13/2020    Procedure: REMOVAL, VASCULAR ACCESS PORT;  Surgeon: Robbi Lovell III, MD;  Location: [facility] OR;  Service: General;  Laterality: Right;    RETROGRADE PYELOGRAPHY  03/16/2021    Procedure: PYELOGRAM, RETROGRADE;  Surgeon: Damari Barber Jr., MD;  Location: [facility] OR;  Service: Urology;;    RETROGRADE PYELOGRAPHY Right 05/18/2021    Procedure: PYELOGRAM, RETROGRADE;  Surgeon: Damari Barber Jr., MD;  Location: [facility] OR;  Service: Urology;  Laterality: Right;    RETROGRADE PYELOGRAPHY  06/22/2022    Procedure: PYELOGRAM, RETROGRADE;  Surgeon: Gaby Garcia MD;  Location: [facility] OR;  Service: Urology;;    SHOULDER SURGERY      TONSILLECTOMY      URETEROSCOPIC REMOVAL OF URETERIC CALCULUS Right 03/30/2021    Procedure: REMOVAL, CALCULUS, URETER, URETEROSCOPIC;  Surgeon: Damari Barber Jr., MD;  Location: [facility] OR;  Service: Urology;  Laterality: Right;    URETEROSCOPIC REMOVAL OF URETERIC CALCULUS Right 04/20/2021    Procedure: REMOVAL, CALCULUS, URETER, URETEROSCOPIC;  Surgeon: Damari Barber Jr., MD;  Location: [facility] OR;  Service: Urology;  Laterality: Right;    URETEROSCOPIC REMOVAL OF URETERIC CALCULUS Right 8/30/2022    Procedure: REMOVAL, CALCULUS, URETER, URETEROSCOPIC;  Surgeon: Damari Barber Jr., MD;  Location:  [facility] OR;  Service: Urology;  Laterality: Right;    vulva cancer         Time Tracking:     PT Received On: 09/16/22  PT Start Time: 0916     PT Stop Time: 0934  PT Total Time (min): 18 min     Billable Minutes: Evaluation 9 and Therapeutic Activity 9      09/16/2022

## 2022-09-16 NOTE — CONSULTS
INPATIENT NEPHROLOGY CONSULT   [facility]    Patient Name: Shara Hutchins  Date: 09/16/2022    Reason for consultation: ESRD    Chief Complaint:   Chief Complaint   Patient presents with    Fall       History of Present Illness:  72-year-old ESRD on HD MWF and history of multiple abdominal surgeries for ischemic colitis who was recently discharged with ischemic colitis s/p surgery on 9/8, readmitted with gen weakness, abd pain and fall, no exac/reliev factors, consulted for dialysis.     Interval History:  9/16- I think she is dry- hold BP meds- UF even    Plan of Care:    Assessment:  ESRD on HD MWF  Hypotension/Baseline HTN  Hypokalemia  SHPT  Anemia of CKD    Plan:    - ordered HD today  - hold BP meds  - ordered 500cc NS  - will UF even  - adjust dialysate  - resume phos binder  - ordered ADAM with HD    Needs SNF or rehab    Thank you for allowing us to participate in this patient's care. We will continue to follow.    Vital Signs:  Temp Readings from Last 3 Encounters:   09/16/22 97.6 °F (36.4 °C) (Oral)   09/14/22 98 °F (36.7 °C) (Oral)   08/30/22 98.8 °F (37.1 °C) (Skin)       Pulse Readings from Last 3 Encounters:   09/16/22 69   09/14/22 79   08/30/22 (P) 71       BP Readings from Last 3 Encounters:   09/16/22 114/68   09/14/22 112/68   08/30/22 (!) (P) 125/58       Weight:  Wt Readings from Last 3 Encounters:   09/15/22 70.8 kg (156 lb)   09/08/22 70.9 kg (156 lb 3.2 oz)   08/30/22 69.4 kg (153 lb)       Past Medical & Surgical History:  Past Medical History:   Diagnosis Date    Cellulitis of trunk     Perineum, numerous episodes    Chronic diarrhea 02/10/2020    Short-gut syndrome    End stage renal disease on dialysis 01/07/2018    Fatigue     Hypertension     Hypothyroidism 02/10/2020    Iron deficiency anemia due to chronic blood loss 01/07/2018    Pulmonary hypertension 03/20/2020    Vulvar cancer 01/07/2019       Past Surgical History:   Procedure Laterality Date    ABDOMINAL  SURGERY      ANGIOGRAPHY OF ARTERIOVENOUS SHUNT N/A 01/19/2022    Procedure: Fistulogram with Possible Intervention;  Surgeon: Raz Maher MD;  Location: [facility] CATH/EP LAB;  Service: Cardiothoracic;  Laterality: N/A;    APPENDECTOMY      AV FISTULA PLACEMENT Left 2018    BACK SURGERY      BLADDER FULGURATION N/A 06/16/2020    Procedure: FULGURATION, BLADDER;  Surgeon: Damari Barber Jr., MD;  Location: [facility] OR;  Service: Urology;  Laterality: N/A;    carpel tunnel surgery once on each hand      CERVICAL FUSION      x 4    cervix repair      CHOLECYSTECTOMY  2000    COLECTOMY      CYSTOSCOPY W/ RETROGRADES  06/16/2020    Procedure: CYSTOSCOPY, WITH RETROGRADE PYELOGRAM;  Surgeon: Damari Barber Jr., MD;  Location: [facility] OR;  Service: Urology;;    CYSTOSCOPY W/ RETROGRADES Right 04/20/2021    Procedure: CYSTOSCOPY, WITH RETROGRADE PYELOGRAM;  Surgeon: Damari Barber Jr., MD;  Location: [facility] OR;  Service: Urology;  Laterality: Right;    CYSTOSCOPY W/ URETERAL STENT PLACEMENT Right 03/16/2021    Procedure: CYSTOSCOPY, WITH URETERAL STENT INSERTION;  Surgeon: Damari Barber Jr., MD;  Location: [facility] OR;  Service: Urology;  Laterality: Right;    CYSTOSCOPY W/ URETERAL STENT PLACEMENT Right 05/18/2021    Procedure: CYSTOSCOPY, WITH URETERAL STENT INSERTION;  Surgeon: Damari Barber Jr., MD;  Location: [facility] OR;  Service: Urology;  Laterality: Right;    CYSTOSCOPY W/ URETERAL STENT PLACEMENT Right 06/22/2022    Procedure: CYSTOSCOPY, WITH URETERAL STENT INSERTION;  Surgeon: Gaby Garcia MD;  Location: [facility] OR;  Service: Urology;  Laterality: Right;  cysto stent exchange    CYSTOSCOPY W/ URETERAL STENT REMOVAL Right 04/20/2021    Procedure: CYSTOSCOPY, WITH URETERAL STENT REMOVAL;  Surgeon: Damari Barber Jr., MD;  Location: [facility] OR;  Service: Urology;  Laterality: Right;    CYSTOSCOPY WITH URETEROSCOPY, RETROGRADE PYELOGRAPHY, AND INSERTION OF STENT Right 03/30/2021    Procedure: CYSTOSCOPY, WITH RETROGRADE  PYELOGRAM AND URETERAL STENT INSERTION;  Surgeon: Damari Barber Jr., MD;  Location: [facility] OR;  Service: Urology;  Laterality: Right;    CYSTOURETEROSCOPY WITH RETROGRADE PYELOGRAPHY AND INSERTION OF STENT INTO URETER Right 01/18/2022    Procedure: CYSTOURETEROSCOPY, WITH RETROGRADE PYELOGRAM AND URETERAL STENT INSERTION;  Surgeon: Damari Barber Jr., MD;  Location: [facility] OR;  Service: Urology;  Laterality: Right;    CYSTOURETEROSCOPY WITH RETROGRADE PYELOGRAPHY AND INSERTION OF STENT INTO URETER Right 8/30/2022    Procedure: CYSTOURETEROSCOPY, WITH RETROGRADE PYELOGRAM AND URETERAL STENT INSERTION;  Surgeon: Damari Barber Jr., MD;  Location: [facility] OR;  Service: Urology;  Laterality: Right;    DIAGNOSTIC LAPAROSCOPY N/A 02/13/2020    Procedure: LAPAROSCOPY, DIAGNOSTIC;  Surgeon: Robbi Lovell III, MD;  Location: [facility] OR;  Service: General;  Laterality: N/A;    HYSTERECTOMY  1980    ILEOSTOMY N/A 02/13/2020    Procedure: CREATION, ILEOSTOMY Loop;  Surgeon: Robbi Lovell III, MD;  Location: [facility] OR;  Service: General;  Laterality: N/A;    ILEOSTOMY CLOSURE N/A 05/18/2020    Procedure: CLOSURE, ILEOSTOMY;  Surgeon: Robbi Lovell III, MD;  Location: [facility] OR;  Service: General;  Laterality: N/A;    LASER LITHOTRIPSY Right 03/30/2021    Procedure: LITHOTRIPSY, USING LASER;  Surgeon: Damari Barber Jr., MD;  Location: [facility] OR;  Service: Urology;  Laterality: Right;    LUMBAR LAMINECTOMY      LYSIS OF ADHESIONS N/A 02/13/2020    Procedure: LYSIS, ADHESIONS;  Surgeon: Robbi Lovell III, MD;  Location: [facility] OR;  Service: General;  Laterality: N/A;    NECK SURGERY      OSTEOTOMY  9/7/2022    Procedure: OSTEOTOMY;  Surgeon: Brigette Chavez MD;  Location: [facility] OR;  Service: General;;    PERCUTANEOUS TRANSLUMINAL ANGIOPLASTY OF ARTERIOVENOUS FISTULA N/A 01/19/2022    Procedure: PTA, AV FISTULA;  Surgeon: Raz Maher MD;  Location: [facility] CATH/EP LAB;  Service: Cardiothoracic;  Laterality: N/A;     PHLEBOGRAPHY  2020    Procedure: VENOGRAM;  Surgeon: Robbi Lovell III, MD;  Location: [facility] OR;  Service: General;;    REMOVAL OF VASCULAR ACCESS PORT Right 2020    Procedure: REMOVAL, VASCULAR ACCESS PORT;  Surgeon: Robbi Lovell III, MD;  Location: [facility] OR;  Service: General;  Laterality: Right;    RETROGRADE PYELOGRAPHY  2021    Procedure: PYELOGRAM, RETROGRADE;  Surgeon: Damari Barber Jr., MD;  Location: [facility] OR;  Service: Urology;;    RETROGRADE PYELOGRAPHY Right 2021    Procedure: PYELOGRAM, RETROGRADE;  Surgeon: Damari Barber Jr., MD;  Location: [facility] OR;  Service: Urology;  Laterality: Right;    RETROGRADE PYELOGRAPHY  2022    Procedure: PYELOGRAM, RETROGRADE;  Surgeon: Gaby Garcia MD;  Location: [facility] OR;  Service: Urology;;    SHOULDER SURGERY      TONSILLECTOMY      URETEROSCOPIC REMOVAL OF URETERIC CALCULUS Right 2021    Procedure: REMOVAL, CALCULUS, URETER, URETEROSCOPIC;  Surgeon: Damari Barber Jr., MD;  Location: [facility] OR;  Service: Urology;  Laterality: Right;    URETEROSCOPIC REMOVAL OF URETERIC CALCULUS Right 2021    Procedure: REMOVAL, CALCULUS, URETER, URETEROSCOPIC;  Surgeon: Damari Barber Jr., MD;  Location: [facility] OR;  Service: Urology;  Laterality: Right;    URETEROSCOPIC REMOVAL OF URETERIC CALCULUS Right 2022    Procedure: REMOVAL, CALCULUS, URETER, URETEROSCOPIC;  Surgeon: Damari Barber Jr., MD;  Location: [facility];  Service: Urology;  Laterality: Right;    vulva cancer         Past Social History:  Social History     Socioeconomic History    Marital status:    Tobacco Use    Smoking status: Former     Packs/day: 1.00     Years: 33.00     Pack years: 33.00     Types: Cigarettes     Quit date: 2000     Years since quittin.7    Smokeless tobacco: Former     Quit date:    Substance and Sexual Activity    Alcohol use: No    Drug use: No     Social Determinants of Health     Financial Resource Strain: Low  Risk     Difficulty of Paying Living Expenses: Not hard at all   Food Insecurity: No Food Insecurity    Worried About Running Out of Food in the Last Year: Never true    Ran Out of Food in the Last Year: Never true   Transportation Needs: No Transportation Needs    Lack of Transportation (Medical): No    Lack of Transportation (Non-Medical): No   Physical Activity: Sufficiently Active    Days of Exercise per Week: 7 days    Minutes of Exercise per Session: 60 min   Stress: No Stress Concern Present    Feeling of Stress : Not at all   Social Connections: Moderately Integrated    Frequency of Communication with Friends and Family: More than three times a week    Frequency of Social Gatherings with Friends and Family: More than three times a week    Attends Yarsanism Services: More than 4 times per year    Active Member of Clubs or Organizations: Yes    Attends Club or Organization Meetings: More than 4 times per year    Marital Status:    Housing Stability: Low Risk     Unable to Pay for Housing in the Last Year: No    Number of Places Lived in the Last Year: 1    Unstable Housing in the Last Year: No       Medications:  Scheduled Meds:  Continuous Infusions:  PRN Meds:.acetaminophen, acetaminophen, aluminum-magnesium hydroxide-simethicone, dextrose 10%, dextrose 10%, glucagon (human recombinant), glucose, glucose, HYDROcodone-acetaminophen, melatonin, morphine, naloxone, ondansetron, prochlorperazine, simethicone, sodium chloride 0.9%, sodium chloride 0.9%  Current Facility-Administered Medications on File Prior to Encounter   Medication Dose Route Frequency Provider Last Rate Last Admin    electrolyte-S (ISOLYTE)   Intravenous Continuous Raz Ruff MD        lactated ringers infusion   Intravenous Continuous Ruy Beltre MD         Current Outpatient Medications on File Prior to Encounter   Medication Sig Dispense Refill    cholecalciferol, vitamin D3, (VITAMIN D3) 25 mcg (1,000 unit) capsule Take  1 capsule (1,000 Units total) by mouth once daily. 10 capsule 0    diltiaZEM (CARDIZEM CD) 120 MG Cp24 Take 1 capsule (120 mg total) by mouth once daily. 90 capsule 3    ferric citrate (AURYXIA) 210 mg iron Tab Take 2 tablets by mouth 3 (three) times daily with meals.      gabapentin (NEURONTIN) 100 MG capsule Take 100 mg by mouth 2 (two) times daily. HCS      levothyroxine (SYNTHROID) 88 MCG tablet TAKE 1 TABLET BEFORE BREAKFAST (Patient taking differently: Take 88 mcg by mouth before breakfast. TAKE 1 TABLET BEFORE BREAKFAST) 90 tablet 3    LORazepam (ATIVAN) 0.5 MG tablet Take 1 tablet (0.5 mg total) by mouth every evening. 90 tablet 1    oxyCODONE (ROXICODONE) 5 MG immediate release tablet Take 1 tablet (5 mg total) by mouth every 4 (four) hours as needed for Pain. 20 tablet 0    promethazine (PHENERGAN) 25 MG tablet TAKE 1 TABLET (25 MG TOTAL) BY MOUTH 2 (TWO) TIMES DAILY AS NEEDED FOR NAUSEA. 60 tablet 0    rOPINIRole (REQUIP) 0.5 MG tablet Take 0.5 mg by mouth every 8 (eight) hours.      sertraline (ZOLOFT) 50 MG tablet Take 1 tablet (50 mg total) by mouth every evening. Take 50 mg by mouth every evening. 90 tablet 1    traMADoL (ULTRAM) 50 mg tablet Take 1 tablet (50 mg total) by mouth every 6 (six) hours as needed. 30 tablet 0    zinc gluconate 50 mg tablet Take 50 mg by mouth once daily.      hydrALAZINE (APRESOLINE) 50 MG tablet Take 1 tablet (50 mg total) by mouth every 8 (eight) hours. (Patient not taking: Reported on 9/15/2022) 270 tablet 0    [DISCONTINUED] miconazole (MICOTIN) 2 % cream Apply topically 2 (two) times daily. 28 g 0       Allergies:  Ciprofloxacin and Pcn [penicillins]    Past Family History:  Reviewed; refer to Hospitalist Admission Note    Review of Systems:  Review of Systems - All 14 systems reviewed and negative, except as noted in HPI    Physical Exam:  General Appearance:    NAD, AAO x 3, cooperative, appears stated age   Head:    Normocephalic, atraumatic   Eyes:    PER, EOMI,  and conjunctiva/sclera clear bilaterally       Mouth:   Moist mucus membranes, no thrush or oral lesions,       normal dentition   Back:     No CVA tenderness   Lungs:     Clear to auscultation bilaterally, no wheezes, crackles,           rales or rhonchi, symmetric air movement, respirations unlabored   Chest wall:    No tenderness or deformity   Heart:    Regular rate and rhythm, S1 and S2 normal, no murmur, rub   or gallop   Abdomen:     Soft, non-tender, non-distended, bowel sounds active all four   quadrants, no RT or guarding, no masses, no organomegaly   Extremities:   Warm and well perfused, distal pulses are intact, no             cyanosis or peripheral edema   MSK:   No joint or muscle swelling, tenderness or deformity   Skin:   Skin color, texture, turgor normal, no rashes or lesions   Neurologic/Psychiatric:   CNII-XII intact, normal strength and sensation       throughout, no asterixis; normal affect, memory, judgement     and insight      Results:  Lab Results   Component Value Date     09/15/2022    K 3.5 09/15/2022     09/15/2022    CO2 23 09/15/2022    BUN 26 (H) 09/15/2022    CREATININE 4.7 (H) 09/15/2022    CALCIUM 8.9 09/15/2022    ANIONGAP 11 09/15/2022    ESTGFRAFRICA 12.5 (A) 07/20/2022    EGFRNONAA 10.9 (A) 07/20/2022       Lab Results   Component Value Date    CALCIUM 8.9 09/15/2022    PHOS 5.4 (H) 09/07/2022       Recent Labs   Lab 09/15/22  1105   WBC 10.98   RBC 3.02*   HGB 10.1*   HCT 32.5*      *   MCH 33.4*   MCHC 31.1*       I have personally reviewed pertinent radiological imaging and reports.    I have spent > 70 minutes providing care for this patient for the above diagnoses. These services have included chart/data/imaging review, evaluation, exam, formulation of plan, , note preparation, and discussions with staff involved in this patient's care.    Yasmin Parker MD MPH  [facility]  [address] [place] [address]  [id number]  [phone] (p)  [phone] (f)

## 2022-09-17 NOTE — PROGRESS NOTES
Critical access hospital Medicine    Progress Note    Patient Name: Shara Hutchins  MRN: [id number]  Patient Class: IP- Inpatient   Admission Date: 9/15/2022  9:53 AM  Length of Stay: 1  Attending Physician: Brenda Stewart MD  Primary Care Provider: April Horton MD  Face-to-Face encounter date: 09/17/2022  Code status:  Chief Complaint: Fall        Subjective:    HPI:Shara Hutchins is a 72 y.o. White female with known history of Ischemic colitis s/p laprotomy with ischemic sigmoid colon, sigmoid rectum stricture who was discharged yesterday to [facility] presented again with a fall. Patient reports after being discharged she went there and slept. Later she woke up and went to bathroom to clean her ostomy bad and do wound care. When she was returning back, she felt light headed and fell.She did not have her emergency alert necklace and no one checked on her overnight. She laid on the floor overnight and dragged herself out of th bathroom to living room all covered with stools. This morning, facility staff found her and call EMS.In the ER, her vitals and labs are stable. She reports severe weakness and abdominal pain. Patient denies change in vision, hearing, headache, fever, cough, congestion, runny nose, chest pain, shortness of breath, palpitations,  diarrhea, constipation, vomiting, dysuria, hematuria, joint pain and back pain. Hospital medicine consulted for admission.    Interval History:   9/16: Patient visibly weak and short of breath, patient may be a candidate for SNF.  PT/OT recommendations pending.  Home O2 evaluation.  Consult to Case Management .  Check iron, vitamin B12 and folate levels.  No concerns/issues overnight reported by the patient or the nursing staff.    9/17:  Patient had dialysis done yesterday.  Later in the evening nephrostomy tube was noted to be leaking urine, urology consulted and case was discussed.  Awaiting SNF placement    Review of Systems All other Review of Systems  were found to be negative expect for that mentioned already in HPI.     Objective:     Vitals:    09/17/22 0831 09/17/22 0908 09/17/22 1201 09/17/22 1355   BP: 119/68  108/65    BP Location:       Patient Position:       Pulse: 75  71    Resp: 18 18 18 18   Temp: 97.9 °F (36.6 °C)  98 °F (36.7 °C)    TempSrc: Oral  Oral    SpO2: 98%  97%    Weight:       Height:            Vitals reviewed.  Constitutional: No distress.   HENT: NC  Head: Atraumatic.   Cardiovascular: Normal rate, regular rhythm and normal heart sounds.   Pulmonary/Chest: Effort normal. No wheezes.   Abdominal: Soft. Bowel sounds are normal. No distension and no mass. No tenderness, colostomy and nephrostomy tube in-situ  Neurological: Alert.   Skin: Skin is warm and dry.   Psych: Appropriate mood and affect    Following labs were Reviewed   CBC:  Recent Labs   Lab 09/17/22  0445   WBC 8.03   HGB 9.5*   HCT 32.4*        CMP:  Recent Labs   Lab 09/17/22  0445   CALCIUM 8.0*      K 3.7   CO2 24      BUN 17   CREATININE 3.8*       Micro Results  Microbiology Results (last 7 days)       ** No results found for the last 168 hours. **             Radiology Reports  X-Ray Elbow Complete Left    Result Date: 9/15/2022  HISTORY: Left elbow pain post fall. FINDINGS: 4 views of the left elbow show no acute fracture, dislocation or destructive osseous lesion. The elbow joint spaces are preserved, with no evidence of an elbow joint effusion. The bones are diffusely osteopenic. There are scattered vascular surgical clips and upper arm endovascular stent. IMPRESSION: Negative for acute fracture or dislocation. Electronically signed by:  Mike Martinze MD  9/15/2022 11:14 AM CDT Workstation: [id number]    X-Ray Elbow Complete Left    Result Date: 8/27/2022  Four views left elbow Clinical history is pain after fall There are no fractures, dislocations or acute osseous abnormalities. The joint spaces are maintained. There are surgical clips in the  anterior medial soft tissues adjacent to the distal humerus. IMPRESSION: No evidence of fracture Electronically signed by:  Meredith Colunga MD  8/27/2022 10:24 AM CDT Workstation: [id number]    CT Head Without Contrast    Result Date: 9/15/2022  CMS MANDATED QUALITY DATA - CT RADIATION - 436 All CT scans at this facility utilize dose modulation, iterative reconstruction, and/or weight based dosing when appropriate to reduce radiation dose to as low as reasonably achievable. Reason: Head trauma, intracranial venous injury suspected Fell this am no loc TECHNIQUE: Head CT without IV contrast. COMPARISON: CT head August 27, 2022 FINDINGS: Gray-white differentiation is maintained without hemorrhage, midline shift, or mass effect. The ventricles and cisterns are maintained. Calvarium is intact. Visualized sinuses are clear. IMPRESSION: No acute intracranial abnormality. Electronically signed by:  Elias Sherwood DO  9/15/2022 11:03 AM CDT Workstation: [id number]    CT Head Without Contrast    Result Date: 8/27/2022  All CT scans at this facility used dose modulation, iterative reconstruction and/or weight-based dosing when appropriate to reduce radiation doses  as low as reasonably achievable. CLINICAL INFORMATION:  FALL FINDINGS:   The ventricles and sulci are normal in size and configuration for age.  There is no intraparenchymal hemorrhage, mass or midline shift.  There are no extra-axial fluid collections.  The paranasal sinus and mastoid air cells are clear. IMPRESSION:   NO ACUTE INTRACRANIAL PROCESS. Electronically signed by:  Meredith Colunga MD  8/27/2022 9:50 AM CDT Workstation: [id number]    CT Cervical Spine Without Contrast    Result Date: 8/27/2022  All CT scans at this facility used dose modulation, iterative reconstruction and/or weight-based dosing when appropriate to reduce radiation doses  as low as reasonably achievable. CT scan of the cervical spine Clinical history is FALL Axial images the  cervical spine were obtained with sagittal and coronal reconstructed images. There is reversal of normal cervical lordosis. There is been instrumented anterior cervical fusion at C4-5 with cortical plate and fixating screws. There is interbody disc expander at C4-5 with bony bridging. There is bony fusion at C5-6 and C6-7. There is mild anterolisthesis of C7 on T1 with disc space narrowing. There has been posterior fusion on the right at C6-7 with bony fusion of the right facet. There is bony fusion of the right facet at C4-5 and C5-6 that of the left facet at C4-5. There is disc space narrowing and anterior spurring at C2-3. The odontoid process is intact and the lateral masses of C1 are symmetrical. There are no fractures or acute osseous abnormalities. The facet joints are aligned. The paraspinous soft tissues are normal. There is no epidural fluid collection. The lung apices are clear. IMPRESSION: No evidence of fracture or acute osseous abnormality Prior anterior and posterior fusion Mild anterolisthesis of C7 on T1 with disc space narrowing Degenerative changes at C2-3 Electronically signed by:  Meredith Colunga MD  8/27/2022 9:54 AM CDT Workstation: [id number]    X-ray Chest AP Portable    Result Date: 9/15/2022  Reason: Chest pain. FINDINGS: Portable chest with comparison chest x-ray September 7, 2022 show normal cardiomediastinal silhouette. Lungs are clear. Pulmonary vasculature is normal. No acute osseous abnormality. IMPRESSION: No acute cardiopulmonary abnormality. Electronically signed by:  Elias Sherwood DO  9/15/2022 11:12 AM CDT Workstation: [id number]    X-Ray Chest AP Portable    Result Date: 9/7/2022  XR CHEST 1 VIEW CLINICAL HISTORY: 72 years Female Sepsis Comparison is made to prior study at 9:56 AM FINDINGS: Cardiomediastinal silhouette is within normal limits. Lungs are normally expanded with no airspace consolidation. No pleural effusion or pneumothorax. No acute osseous abnormality.  There is been prior cervical fusion. IMPRESSION: No acute pulmonary process. Electronically signed by:  Meredith Colunga MD  9/7/2022 12:18 PM CDT Workstation: [id number]    X-ray Chest AP Portable    Result Date: 9/7/2022  Chest single view Clinical data:Nausea, vomiting. Comparison to July 7. FINDINGS: AP view of the chest demonstrates no cardiac, pulmonary, or osseous abnormalities. IMPRESSION: 1. Normal chest single view. Electronically signed by:  Dixon Thomas MD  9/7/2022 10:24 AM CDT Workstation: [id number]    US Carotid Bilateral    Result Date: 9/15/2022  CMS MANDATED QUALITY DATA - CAROTID - 195 All measurements and percent stenosis described below were determined using NASCET criteria or criteria similar to NASCET, as defined by the Society of Radiologists in Ultrasound Consensus Conference, Radiology, 2003 Bilateral carotid doppler evaluation HISTORY: Dizziness, carotid stenosis. Grey scale, duplex and color doppler interrogation are performed and interpreted in accordance with NASCET criteria. Scattered atheromatous changes are present within the extracranial carotid circulation. Duplex and color Doppler interrogation fail to demonstrate evidence of hemodynamically significant stenosis on either side. Antegrade flow is observed within both vertebral arteries. IMPRESSION: Scattered bilateral atheromatous changes without evidence of hemodynamically significant carotid arterial stenosis. Electronically signed by:  Janneth Collazo MD  9/15/2022 2:33 PM CDT Workstation: [id number]    US Hemodialysis Access    Result Date: 9/12/2022  REASON: stenosis TECHNIQUE: Grayscale and color Doppler ultrasound of the left upper extremity vasculature. COMPARISON: Ultrasound January 17, 2022. FINDINGS: Left AV fistula noted with patent anastomosis is patent. Elevated peak systolic velocities at the proximal anastomosis are similar to previous exam, measuring up to 462.2 cm/s. No vascular occlusions observed.  IMPRESSION: As above. Electronically signed by:  Elias Sherwood DO  9/12/2022 4:44 PM CDT Workstation: [id number]    SURG FL Surgery Fluoro Usage    Result Date: 8/30/2022  See OP Notes for results. IMPRESSION: See OP Notes for results. This procedure was auto-finalized by: Virtual Radiologist    CT Abdomen Pelvis  Without Contrast    Result Date: 9/7/2022  CMS MANDATED QUALITY DATA - CT RADIATION - 436 All CT scans at this facility utilize dose modulation, iterative reconstruction, and/or weight based dosing when appropriate to reduce radiation dose to as low as reasonably achievable. HISTORY: GI bleed, abdominal pain COMPARISON: 7/7/2022 FINDINGS: Noncontrast axial images were obtained. Nonenhanced study is tailored for the detection of urolithiasis, and is insensitive for abnormalities of the solid organs, vasculature and hollow viscera. CT ABDOMEN: There is coronary artery calcification. The lung bases are clear. There is moderate portal venous air within the intrahepatic portal veins and main portal vein. There is diffuse wall thickening of the colon with pneumatosis within the descending colon. Findings are suggestive of ischemic colitis. There is air within a vein within the left lower quadrant. There is edema within the pericolonic fat. The small bowel is not dilated. There are anastomotic sutures in the right lower quadrant. The liver, spleen and pancreas have a normal noncontrast appearance. The gallbladder is absent. The adrenal glands are normal. The kidneys are small in size with numerous nonobstructing renal stones. There is a right ureteral stent in place. There is resolution of the right hydronephrosis. There is moderate vascular calcification of the aorta and its branches. There is no mesenteric or retroperitoneal adenopathy. There is levoscoliosis of the lumbar spine with multilevel degenerative changes. There are degenerative changes of both hips. CT PELVIS: There is pneumatosis within the  descending colon and sigmoid colon with diffuse wall thickening of the colon suggestive of ischemic colitis. There is minimal free fluid in the pelvis. The bladder is normal. The uterus is absent. There is moderate vascular calcification of the iliac arteries. IMPRESSION: Portal venous air with pneumatosis within the descending colon and sigmoid colon with diffuse wall thickening of the colon. Findings are suggestive of ischemic colitis. There is no free air Minimal free fluid within the pelvis Bilateral renal calculi with right ureteral stent in place with resolution of the right hydronephrosis Moderate vascular calcification of the aorta and iliac arteries These findings were called to Dr. rausch in the emergency department at 11:45 AM Electronically signed by:  Meredith Colunga MD  9/7/2022 11:50 AM CDT Workstation: [id number]       Meds  Scheduled Meds:   epoetin nica-ebpx (RETACRIT) injection  50 Units/kg Intravenous Every Mon, Wed, Fri    levothyroxine  88 mcg Oral Before breakfast     Continuous Infusions:      PRN Meds:.acetaminophen, acetaminophen, aluminum-magnesium hydroxide-simethicone, dextrose 10%, dextrose 10%, glucagon (human recombinant), glucose, glucose, HYDROcodone-acetaminophen, melatonin, morphine, naloxone, ondansetron, prochlorperazine, simethicone, sodium chloride 0.9%, sodium chloride 0.9%.    Assessment & Plan:     Active Hospital Problems    Diagnosis    Fall    ESRD on dialysis MWF     Plan:   - urology consulted concerning nephrostomy tube  - continue to hold blood pressure medications  - PT/OT  - case management consult for SNF placement  - nephrology on board      Discharge Planning:   Is the patient medically ready for discharge?: no    Reason for patient still in hospital (select all that apply): Patient trending condition and Treatment    Above encounter included review of the medical records, interviewing and examining the patient face-to-face, discussion with family and other  health care providers, ordering and interpreting lab/test results and formulating a plan of care.     Medical Decision Making:      [_] Low Complexity  [_] Moderate Complexity  [x] High Complexity      Brenda Stewart MD  Department of Hospital Medicine   [facility]

## 2022-09-17 NOTE — PROGRESS NOTES
INPATIENT NEPHROLOGY Progress Note  [facility]    Patient Name: Shara Hutchins  Date: 09/17/2022    Reason for consultation: ESRD    Chief Complaint:   Chief Complaint   Patient presents with    Fall       History of Present Illness:  72-year-old ESRD on HD MWF and history of multiple abdominal surgeries for ischemic colitis who was recently discharged with ischemic colitis s/p surgery on 9/8, readmitted with gen weakness, abd pain and fall, no exac/reliev factors, consulted for dialysis.     Interval History:  9/16- I think she is dry- hold BP meds- UF even  9/17- awaiting SNF    Plan of Care:    Assessment:  ESRD on HD MWF  Hypotension/Baseline HTN  Hypokalemia  SHPT  Anemia of CKD    Plan:    - continue HD MWF  - BP is better after fluid challenge- hold home BP meds  - adjust dialysate  - continue phos binder (home supply)  - continue ADAM with HD    Thank you for allowing us to participate in this patient's care. We will continue to follow.    Vital Signs:  Temp Readings from Last 3 Encounters:   09/17/22 97.9 °F (36.6 °C) (Oral)   09/14/22 98 °F (36.7 °C) (Oral)   08/30/22 98.8 °F (37.1 °C) (Skin)       Pulse Readings from Last 3 Encounters:   09/17/22 75   09/14/22 79   08/30/22 (P) 71       BP Readings from Last 3 Encounters:   09/17/22 119/68   09/14/22 112/68   08/30/22 (!) (P) 125/58       Weight:  Wt Readings from Last 3 Encounters:   09/15/22 70.8 kg (156 lb)   09/08/22 70.9 kg (156 lb 3.2 oz)   08/30/22 69.4 kg (153 lb)     Medications:  Scheduled Meds:   epoetin nica-ebpx (RETACRIT) injection  50 Units/kg Intravenous Every Mon, Wed, Fri    levothyroxine  88 mcg Oral Before breakfast     Continuous Infusions:  PRN Meds:.acetaminophen, acetaminophen, aluminum-magnesium hydroxide-simethicone, dextrose 10%, dextrose 10%, glucagon (human recombinant), glucose, glucose, HYDROcodone-acetaminophen, melatonin, morphine, naloxone, ondansetron, prochlorperazine, simethicone, sodium chloride 0.9%,  sodium chloride 0.9%  Current Facility-Administered Medications on File Prior to Encounter   Medication Dose Route Frequency Provider Last Rate Last Admin    electrolyte-S (ISOLYTE)   Intravenous Continuous Raz Ruff MD        lactated ringers infusion   Intravenous Continuous Ruy Beltre MD         Current Outpatient Medications on File Prior to Encounter   Medication Sig Dispense Refill    cholecalciferol, vitamin D3, (VITAMIN D3) 25 mcg (1,000 unit) capsule Take 1 capsule (1,000 Units total) by mouth once daily. 10 capsule 0    diltiaZEM (CARDIZEM CD) 120 MG Cp24 Take 1 capsule (120 mg total) by mouth once daily. 90 capsule 3    ferric citrate (AURYXIA) 210 mg iron Tab Take 2 tablets by mouth 3 (three) times daily with meals.      gabapentin (NEURONTIN) 100 MG capsule Take 100 mg by mouth 2 (two) times daily. HCS      levothyroxine (SYNTHROID) 88 MCG tablet TAKE 1 TABLET BEFORE BREAKFAST (Patient taking differently: Take 88 mcg by mouth before breakfast. TAKE 1 TABLET BEFORE BREAKFAST) 90 tablet 3    LORazepam (ATIVAN) 0.5 MG tablet Take 1 tablet (0.5 mg total) by mouth every evening. 90 tablet 1    oxyCODONE (ROXICODONE) 5 MG immediate release tablet Take 1 tablet (5 mg total) by mouth every 4 (four) hours as needed for Pain. 20 tablet 0    promethazine (PHENERGAN) 25 MG tablet TAKE 1 TABLET (25 MG TOTAL) BY MOUTH 2 (TWO) TIMES DAILY AS NEEDED FOR NAUSEA. 60 tablet 0    rOPINIRole (REQUIP) 0.5 MG tablet Take 0.5 mg by mouth every 8 (eight) hours.      sertraline (ZOLOFT) 50 MG tablet Take 1 tablet (50 mg total) by mouth every evening. Take 50 mg by mouth every evening. 90 tablet 1    traMADoL (ULTRAM) 50 mg tablet Take 1 tablet (50 mg total) by mouth every 6 (six) hours as needed. 30 tablet 0    zinc gluconate 50 mg tablet Take 50 mg by mouth once daily.      hydrALAZINE (APRESOLINE) 50 MG tablet Take 1 tablet (50 mg total) by mouth every 8 (eight) hours. (Patient not taking: Reported on 9/15/2022)  270 tablet 0    [DISCONTINUED] miconazole (MICOTIN) 2 % cream Apply topically 2 (two) times daily. 28 g 0     Review of Systems:  Neg    Physical Exam:  Constitutional: nad, aao x 3  Heart: rrr, no m/r/g, wwp, no edema  Lungs: ctab, no w/r/r/c, no lb  Abdomen: s/nt/nd, +BS, ostomy    Results:  Lab Results   Component Value Date     09/17/2022    K 3.7 09/17/2022     09/17/2022    CO2 24 09/17/2022    BUN 17 09/17/2022    CREATININE 3.8 (H) 09/17/2022    CALCIUM 8.0 (L) 09/17/2022    ANIONGAP 6 (L) 09/17/2022    ESTGFRAFRICA 12.5 (A) 07/20/2022    EGFRNONAA 10.9 (A) 07/20/2022       Lab Results   Component Value Date    CALCIUM 8.0 (L) 09/17/2022    PHOS 5.4 (H) 09/07/2022       Recent Labs   Lab 09/17/22  0445   WBC 8.03   RBC 2.85*   HGB 9.5*   HCT 32.4*      *   MCH 33.3*   MCHC 29.3*       I have personally reviewed pertinent radiological imaging and reports.    I have spent > 35 minutes providing care for this patient for the above diagnoses. These services have included chart/data/imaging review, evaluation, exam, formulation of plan, , note preparation, and discussions with staff involved in this patient's care.    Yasmin Parker MD MPH  [facility]  [address] [place] [id number]  [phone] (p)  [phone] (f)

## 2022-09-17 NOTE — PLAN OF CARE
Problem: Adult Inpatient Plan of Care  Goal: Plan of Care Review  Outcome: Ongoing, Progressing  Goal: Patient-Specific Goal (Individualized)  Outcome: Ongoing, Progressing  Goal: Absence of Hospital-Acquired Illness or Injury  Outcome: Ongoing, Progressing  Goal: Optimal Comfort and Wellbeing  Outcome: Ongoing, Progressing  Goal: Readiness for Transition of Care  Outcome: Ongoing, Progressing     Problem: Adjustment to Illness (Sepsis/Septic Shock)  Goal: Optimal Coping  Outcome: Ongoing, Progressing     Problem: Bleeding (Sepsis/Septic Shock)  Goal: Absence of Bleeding  Outcome: Ongoing, Progressing     Problem: Glycemic Control Impaired (Sepsis/Septic Shock)  Goal: Blood Glucose Level Within Desired Range  Outcome: Ongoing, Progressing     Problem: Infection Progression (Sepsis/Septic Shock)  Goal: Absence of Infection Signs and Symptoms  Outcome: Ongoing, Progressing     Problem: Nutrition Impaired (Sepsis/Septic Shock)  Goal: Optimal Nutrition Intake  Outcome: Ongoing, Progressing     Problem: Fluid and Electrolyte Imbalance (Acute Kidney Injury/Impairment)  Goal: Fluid and Electrolyte Balance  Outcome: Ongoing, Progressing     Problem: Oral Intake Inadequate (Acute Kidney Injury/Impairment)  Goal: Optimal Nutrition Intake  Outcome: Ongoing, Progressing     Problem: Renal Function Impairment (Acute Kidney Injury/Impairment)  Goal: Effective Renal Function  Outcome: Ongoing, Progressing     Problem: Fluid Imbalance (Pneumonia)  Goal: Fluid Balance  Outcome: Ongoing, Progressing     Problem: Infection (Pneumonia)  Goal: Resolution of Infection Signs and Symptoms  Outcome: Ongoing, Progressing     Problem: Respiratory Compromise (Pneumonia)  Goal: Effective Oxygenation and Ventilation  Outcome: Ongoing, Progressing     Problem: Impaired Wound Healing  Goal: Optimal Wound Healing  Outcome: Ongoing, Progressing     Problem: Skin Injury Risk Increased  Goal: Skin Health and Integrity  Outcome: Ongoing,  Progressing     Problem: Device-Related Complication Risk (Hemodialysis)  Goal: Safe, Effective Therapy Delivery  Outcome: Ongoing, Progressing     Problem: Hemodynamic Instability (Hemodialysis)  Goal: Effective Tissue Perfusion  Outcome: Ongoing, Progressing     Problem: Infection (Hemodialysis)  Goal: Absence of Infection Signs and Symptoms  Outcome: Ongoing, Progressing

## 2022-09-17 NOTE — CONSULTS
71 yo patient w end stage renal disease on dialysis  Patient has renal stones and recurrent uti  Has been txed w stent exchanges  Now has a nephrostomy tube    Tube w no drainage  Urine leaking around tube    Recent CT scan shows that a stent is in place    Cases like this can be managed with serial stent exchange  Or she may benefit from a simple nephrectomy    No other recommendations at this time

## 2022-09-17 NOTE — PT/OT/SLP EVAL
Occupational Therapy   Evaluation    Name: Shara Hutchins  MRN: [id number]  Admitting Diagnosis:  <principal problem not specified>  Recent Surgery: * No surgery found *      Recommendations:     Discharge Recommendations: nursing facility, skilled  Discharge Equipment Recommendations:  none  Barriers to discharge:  None    Assessment:     Shara Hutchins is a 72 y.o. female with a medical diagnosis of <principal problem not specified>.  Pt agreeable to OT evaluation this AM. Performance deficits affecting function: weakness, impaired endurance, impaired self care skills, impaired functional mobility, gait instability, impaired balance, decreased upper extremity function, decreased lower extremity function, impaired cardiopulmonary response to activity.      Rehab Prognosis: Fair; patient would benefit from acute skilled OT services to address these deficits and reach maximum level of function.       Plan:     Patient to be seen 5 x/week to address the above listed problems via self-care/home management, therapeutic exercises, therapeutic activities  Plan of Care Expires: 10/17/22  Plan of Care Reviewed with: patient    Subjective     Chief Complaint: fatigue  Patient/Family Comments/goals: to go to SNF to return to PLOF    Occupational Profile:  Living Environment: Pt lives alone at an [facility]; Pt has a walk-in shower with a shower chair, grab bars present in shower, and raised toilet  Previous level of function: Independent with ADLs, IADLs, and functional mobility  Roles and Routines: primary homemaker (pt reports she cooks some even though the facility provides meals)  Equipment Used at Home:  shower chair, raised toilet, grab bar (Pt reports she has a RW but doesn't use it)  Assistance upon Discharge: yes, from facility    Pain/Comfort:  Pain Rating 1: 0/10    Patients cultural, spiritual, Sabianism conflicts given the current situation:      Objective:     Communicated with: nursing prior to session.  Patient found  sitting edge of bed with RT ith peripheral IV, colostomy, nephrostomy, oxygen, bed alarm upon OT entry to room.    General Precautions: Standard, fall   Orthopedic Precautions:N/A   Braces: N/A  Respiratory Status: Room air    Occupational Performance:    Bed Mobility:    Patient completed Sit to Supine with contact guard assistance    Functional Mobility/Transfers:  Patient completed Sit <> Stand Transfer with minimum assistance  with  rolling walker   Functional Mobility: pt amb a few steps forwards and back with minimum assistance with RW with no LOB; RT present to assess oxygen needs while ambulating    Activities of Daily Living:  Feeding:  independence    Grooming: stand by assistance seated EOB  Toileting: pt reports independence with colostomy and nephrostomy management      Cognitive/Visual Perceptual:  Cognitive/Psychosocial Skills:     -       Oriented to: Person, Place, Time, and Situation   -       Follows Commands/attention:Follows two-step commands  -       Communication: clear/fluent  -       Memory: No Deficits noted  -       Safety awareness/insight to disability: intact   -       Mood/Affect/Coping skills/emotional control: Appropriate to situation, Cooperative, and Pleasant    Physical Exam:  Balance:    -       SBA seated balance; Min A standing balance  Upper Extremity Range of Motion:     -       Right Upper Extremity: WFL  -       Left Upper Extremity: WFL  Upper Extremity Strength:    -       Right Upper Extremity: WFL  -       Left Upper Extremity: WFL   Strength:    -       Right Upper Extremity: WFL  -       Left Upper Extremity: WFL  Fine Motor Coordination:    -       Intact  Gross motor coordination:   WFL    AMPAC 6 Click ADL:  AMPAC Total Score: 18    Treatment & Education:  Pt educated on role of OT/POC, importance of OOB/EOB activity, use of call bell, and safety during ADLs, transfers, and functional mobility     Patient left HOB elevated with all lines intact, call button in  reach, and bed alarm on    GOALS:   Multidisciplinary Problems       Occupational Therapy Goals          Problem: Occupational Therapy    Goal Priority Disciplines Outcome Interventions   Occupational Therapy Goal     OT, PT/OT     Description: Goals to be met by: 10/17/22     Patient will increase functional independence with ADLs by performing:    UE Dressing with Supervision.  LE Dressing with Supervision.  Grooming while standing at sink with Supervision.  Toileting from toilet with Supervision for hygiene and clothing management.   Toilet transfer to toilet with Supervision.                         History:     Past Medical History:   Diagnosis Date    Cellulitis of trunk     Perineum, numerous episodes    Chronic diarrhea 02/10/2020    Short-gut syndrome    End stage renal disease on dialysis 01/07/2018    Fatigue     Hypertension     Hypothyroidism 02/10/2020    Iron deficiency anemia due to chronic blood loss 01/07/2018    Pulmonary hypertension 03/20/2020    Vulvar cancer 01/07/2019         Past Surgical History:   Procedure Laterality Date    ABDOMINAL SURGERY      ANGIOGRAPHY OF ARTERIOVENOUS SHUNT N/A 01/19/2022    Procedure: Fistulogram with Possible Intervention;  Surgeon: Raz Maher MD;  Location: [facility] CATH/EP LAB;  Service: Cardiothoracic;  Laterality: N/A;    APPENDECTOMY      AV FISTULA PLACEMENT Left 2018    BACK SURGERY      BLADDER FULGURATION N/A 06/16/2020    Procedure: FULGURATION, BLADDER;  Surgeon: Damari Barber Jr., MD;  Location: [facility];  Service: Urology;  Laterality: N/A;    carpel tunnel surgery once on each hand      CERVICAL FUSION      x 4    cervix repair      CHOLECYSTECTOMY  2000    COLECTOMY      CYSTOSCOPY W/ RETROGRADES  06/16/2020    Procedure: CYSTOSCOPY, WITH RETROGRADE PYELOGRAM;  Surgeon: Damari Barber Jr., MD;  Location: [facility] OR;  Service: Urology;;    CYSTOSCOPY W/ RETROGRADES Right 04/20/2021    Procedure: CYSTOSCOPY, WITH RETROGRADE PYELOGRAM;  Surgeon: Damari  LASHANDA Barber Jr., MD;  Location: [facility] OR;  Service: Urology;  Laterality: Right;    CYSTOSCOPY W/ URETERAL STENT PLACEMENT Right 03/16/2021    Procedure: CYSTOSCOPY, WITH URETERAL STENT INSERTION;  Surgeon: Damari Barber Jr., MD;  Location: [facility] OR;  Service: Urology;  Laterality: Right;    CYSTOSCOPY W/ URETERAL STENT PLACEMENT Right 05/18/2021    Procedure: CYSTOSCOPY, WITH URETERAL STENT INSERTION;  Surgeon: Damari Barber Jr., MD;  Location: [facility] OR;  Service: Urology;  Laterality: Right;    CYSTOSCOPY W/ URETERAL STENT PLACEMENT Right 06/22/2022    Procedure: CYSTOSCOPY, WITH URETERAL STENT INSERTION;  Surgeon: Gaby Garcia MD;  Location: [facility] OR;  Service: Urology;  Laterality: Right;  cysto stent exchange    CYSTOSCOPY W/ URETERAL STENT REMOVAL Right 04/20/2021    Procedure: CYSTOSCOPY, WITH URETERAL STENT REMOVAL;  Surgeon: Damari Barber Jr., MD;  Location: [facility] OR;  Service: Urology;  Laterality: Right;    CYSTOSCOPY WITH URETEROSCOPY, RETROGRADE PYELOGRAPHY, AND INSERTION OF STENT Right 03/30/2021    Procedure: CYSTOSCOPY, WITH RETROGRADE PYELOGRAM AND URETERAL STENT INSERTION;  Surgeon: Damari Barber Jr., MD;  Location: [facility] OR;  Service: Urology;  Laterality: Right;    CYSTOURETEROSCOPY WITH RETROGRADE PYELOGRAPHY AND INSERTION OF STENT INTO URETER Right 01/18/2022    Procedure: CYSTOURETEROSCOPY, WITH RETROGRADE PYELOGRAM AND URETERAL STENT INSERTION;  Surgeon: Damari Barber Jr., MD;  Location: [facility];  Service: Urology;  Laterality: Right;    CYSTOURETEROSCOPY WITH RETROGRADE PYELOGRAPHY AND INSERTION OF STENT INTO URETER Right 8/30/2022    Procedure: CYSTOURETEROSCOPY, WITH RETROGRADE PYELOGRAM AND URETERAL STENT INSERTION;  Surgeon: Damari Barber Jr., MD;  Location: [facility] OR;  Service: Urology;  Laterality: Right;    DIAGNOSTIC LAPAROSCOPY N/A 02/13/2020    Procedure: LAPAROSCOPY, DIAGNOSTIC;  Surgeon: Robbi Lovell III, MD;  Location: [facility];  Service: General;  Laterality: N/A;     HYSTERECTOMY  1980    ILEOSTOMY N/A 02/13/2020    Procedure: CREATION, ILEOSTOMY Loop;  Surgeon: Robbi Lovell III, MD;  Location: [facility] OR;  Service: General;  Laterality: N/A;    ILEOSTOMY CLOSURE N/A 05/18/2020    Procedure: CLOSURE, ILEOSTOMY;  Surgeon: Robbi Lovell III, MD;  Location: [facility] OR;  Service: General;  Laterality: N/A;    LASER LITHOTRIPSY Right 03/30/2021    Procedure: LITHOTRIPSY, USING LASER;  Surgeon: Damari Barber Jr., MD;  Location: [facility] OR;  Service: Urology;  Laterality: Right;    LUMBAR LAMINECTOMY      LYSIS OF ADHESIONS N/A 02/13/2020    Procedure: LYSIS, ADHESIONS;  Surgeon: Robbi Lovell III, MD;  Location: [facility] OR;  Service: General;  Laterality: N/A;    NECK SURGERY      OSTEOTOMY  9/7/2022    Procedure: OSTEOTOMY;  Surgeon: Brigette Chavez MD;  Location: [facility] OR;  Service: General;;    PERCUTANEOUS TRANSLUMINAL ANGIOPLASTY OF ARTERIOVENOUS FISTULA N/A 01/19/2022    Procedure: PTA, AV FISTULA;  Surgeon: Raz Maher MD;  Location: [facility] CATH/EP LAB;  Service: Cardiothoracic;  Laterality: N/A;    PHLEBOGRAPHY  02/28/2020    Procedure: VENOGRAM;  Surgeon: Robbi Lovell III, MD;  Location: [facility] OR;  Service: General;;    REMOVAL OF VASCULAR ACCESS PORT Right 02/13/2020    Procedure: REMOVAL, VASCULAR ACCESS PORT;  Surgeon: Robbi Lovell III, MD;  Location: [facility] OR;  Service: General;  Laterality: Right;    RETROGRADE PYELOGRAPHY  03/16/2021    Procedure: PYELOGRAM, RETROGRADE;  Surgeon: Damari Barber Jr., MD;  Location: [facility] OR;  Service: Urology;;    RETROGRADE PYELOGRAPHY Right 05/18/2021    Procedure: PYELOGRAM, RETROGRADE;  Surgeon: Damari Barber Jr., MD;  Location: [facility] OR;  Service: Urology;  Laterality: Right;    RETROGRADE PYELOGRAPHY  06/22/2022    Procedure: PYELOGRAM, RETROGRADE;  Surgeon: Gaby Garcia MD;  Location: [facility] OR;  Service: Urology;;    SHOULDER SURGERY      TONSILLECTOMY      URETEROSCOPIC REMOVAL OF URETERIC  CALCULUS Right 03/30/2021    Procedure: REMOVAL, CALCULUS, URETER, URETEROSCOPIC;  Surgeon: Damari Barber Jr., MD;  Location: [facility] OR;  Service: Urology;  Laterality: Right;    URETEROSCOPIC REMOVAL OF URETERIC CALCULUS Right 04/20/2021    Procedure: REMOVAL, CALCULUS, URETER, URETEROSCOPIC;  Surgeon: Damari Barber Jr., MD;  Location: [facility] OR;  Service: Urology;  Laterality: Right;    URETEROSCOPIC REMOVAL OF URETERIC CALCULUS Right 8/30/2022    Procedure: REMOVAL, CALCULUS, URETER, URETEROSCOPIC;  Surgeon: Damari Barber Jr., MD;  Location: [facility] OR;  Service: Urology;  Laterality: Right;    vulva cancer         Time Tracking:     OT Date of Treatment: 09/17/22  OT Start Time: 0948  OT Stop Time: 1006  OT Total Time (min): 18 min    Billable Minutes:Evaluation 10  Self Care/Home Management 8    9/17/2022

## 2022-09-17 NOTE — CARE UPDATE
09/17/22 0955   Home Oxygen Qualification   $ Home O2 Qualification Tech time 15 minutes   Room Air SpO2 At Rest 99 %   Room Air SpO2 During Ambulation 98 %   SpO2 Post Ambulation 95 %   Post Ambulation Heart Rate 90 bpm   Post Ambulation O2 LPM 0 LPM   Home O2 Eval Comments   (pt was not able to ambulate for more than a few minutes however she did not require any o2 and had no serious desaturation. currently on RA with sao2 of 95%.)   Respiratory Evaluation   $ Care Plan Tech Time 15 min

## 2022-09-18 NOTE — PLAN OF CARE
Problem: Adult Inpatient Plan of Care  Goal: Plan of Care Review  Outcome: Ongoing, Progressing  Goal: Patient-Specific Goal (Individualized)  Outcome: Ongoing, Progressing  Goal: Absence of Hospital-Acquired Illness or Injury  Outcome: Ongoing, Progressing  Goal: Optimal Comfort and Wellbeing  Outcome: Ongoing, Progressing  Goal: Readiness for Transition of Care  Outcome: Ongoing, Progressing     Problem: Adjustment to Illness (Sepsis/Septic Shock)  Goal: Optimal Coping  Outcome: Ongoing, Progressing     Problem: Bleeding (Sepsis/Septic Shock)  Goal: Absence of Bleeding  Outcome: Ongoing, Progressing     Problem: Glycemic Control Impaired (Sepsis/Septic Shock)  Goal: Blood Glucose Level Within Desired Range  Outcome: Ongoing, Progressing     Problem: Infection Progression (Sepsis/Septic Shock)  Goal: Absence of Infection Signs and Symptoms  Outcome: Ongoing, Progressing     Problem: Nutrition Impaired (Sepsis/Septic Shock)  Goal: Optimal Nutrition Intake  Outcome: Ongoing, Progressing     Problem: Fluid and Electrolyte Imbalance (Acute Kidney Injury/Impairment)  Goal: Fluid and Electrolyte Balance  Outcome: Ongoing, Progressing     Problem: Oral Intake Inadequate (Acute Kidney Injury/Impairment)  Goal: Optimal Nutrition Intake  Outcome: Ongoing, Progressing     Problem: Renal Function Impairment (Acute Kidney Injury/Impairment)  Goal: Effective Renal Function  Outcome: Ongoing, Progressing     Problem: Fluid Imbalance (Pneumonia)  Goal: Fluid Balance  Outcome: Ongoing, Progressing     Problem: Infection (Pneumonia)  Goal: Resolution of Infection Signs and Symptoms  Outcome: Ongoing, Progressing     Problem: Respiratory Compromise (Pneumonia)  Goal: Effective Oxygenation and Ventilation  Outcome: Ongoing, Progressing     Problem: Impaired Wound Healing  Goal: Optimal Wound Healing  Outcome: Ongoing, Progressing     Problem: Skin Injury Risk Increased  Goal: Skin Health and Integrity  Outcome: Ongoing,  Progressing     Problem: Device-Related Complication Risk (Hemodialysis)  Goal: Safe, Effective Therapy Delivery  Outcome: Ongoing, Progressing     Problem: Hemodynamic Instability (Hemodialysis)  Goal: Effective Tissue Perfusion  Outcome: Ongoing, Progressing     Problem: Infection (Hemodialysis)  Goal: Absence of Infection Signs and Symptoms  Outcome: Ongoing, Progressing     Problem: Fall Injury Risk  Goal: Absence of Fall and Fall-Related Injury  Outcome: Ongoing, Progressing

## 2022-09-18 NOTE — PT/OT/SLP PROGRESS
Physical Therapy Treatment    Patient Name:  Shara Hutchins   MRN:  [id number]    Recommendations:     Discharge Recommendations:  nursing facility, skilled, home health PT (dependent on medical status and progress towards goals)   Discharge Equipment Recommendations: walker, rolling   Barriers to discharge:  increased assist with mobility    Assessment:     Shara Hutchins is a 72 y.o. female admitted with a medical diagnosis of <principal problem not specified>.  She presents with the following impairments/functional limitations:  weakness, impaired endurance, impaired self care skills, impaired functional mobility, gait instability, impaired balance, decreased upper extremity function, decreased lower extremity function, impaired cardiopulmonary response to activity.  Pt agreeable to visit. Pt wanting to get a bath and linens changed. Pt reports that her nephrostomy site is leaking. Gown and linens noted to be soiled. Pt performed bed mobility with contact guard assist. Pt tolerated static sitting EOB with SOB while doffing soiled gown and donning clean gown. Pt ambulated 25' with RW and min assist with verbal cuing for RW management. Disposable blue pad draped around nephrostomy site to prevent further soiling of pt's gown and bed linens. RN and extender informed of leaking nephrostomy site.    Rehab Prognosis: Fair; patient would benefit from acute skilled PT services to address these deficits and reach maximum level of function.    Recent Surgery: * No surgery found *      Plan:     During this hospitalization, patient to be seen 6 x/week to address the identified rehab impairments via therapeutic activities, therapeutic exercises, gait training, neuromuscular re-education and progress toward the following goals:    Plan of Care Expires:  10/16/22    Subjective     Chief Complaint: nephrostomy leaking  Patient/Family Comments/goals: to get a bath  Pain/Comfort:  Pain Rating 1: 0/10      Objective:     Communicated  with RN prior to session.  Patient found HOB elevated with   upon PT entry to room.     General Precautions: Standard, fall, contact   Orthopedic Precautions:N/A   Braces:    Respiratory Status: Room air     Functional Mobility:  Bed Mobility:     Supine to Sit: contact guard assistance  Sit to Supine: contact guard assistance  Transfers:     Sit to Stand:  contact guard assistance with rolling walker  Gait: x 25' with RW and min assist, VC for RW management      AM-PAC 6 CLICK MOBILITY          Therapeutic Activities and Exercises:   Pt educated on importance of time OOB, importance of intermittent mobility, safe techniques for transfers/ambulation, discharge recommendations/options, and use of call light for assistance and fall prevention.      Patient left HOB elevated with all lines intact, call button in reach, bed alarm on, and RN notified..    GOALS:   Multidisciplinary Problems       Physical Therapy Goals          Problem: Physical Therapy    Goal Priority Disciplines Outcome Goal Variances Interventions   Physical Therapy Goal     PT, PT/OT Ongoing, Progressing     Description: Goals to be met by: 10/16/22     Patient will increase functional independence with mobility by performin. Supine to sit with Supervision  2. Sit to stand transfer with Supervision  3. Bed to chair transfer with Supervision using least restrictive assistive device  4. Gait  x 150 feet with Supervision using  least restrictive assistive device                             Time Tracking:     PT Received On: 22  PT Start Time: 1022     PT Stop Time: 1041  PT Total Time (min): 19 min     Billable Minutes: Gait Training 19    Treatment Type: Treatment        PTA Visit Number: 1     2022

## 2022-09-18 NOTE — PROGRESS NOTES
INPATIENT NEPHROLOGY Progress Note  [facility]    Patient Name: Shara Hutchins  Date: 09/18/2022    Reason for consultation: ESRD    Chief Complaint:   Chief Complaint   Patient presents with    Fall       History of Present Illness:  72-year-old ESRD on HD MWF and history of multiple abdominal surgeries for ischemic colitis who was recently discharged with ischemic colitis s/p surgery on 9/8, readmitted with gen weakness, abd pain and fall, no exac/reliev factors, consulted for dialysis.     Interval History:  9/16- I think she is dry- hold BP meds- UF even  9/17- awaiting SNF  9/18- BP remains low, pain meds helping, leakage around NT noted along with urology note, feels weak    Plan of Care:    Assessment:  ESRD on HD MWF  Hypotension/Baseline HTN  Hypokalemia  SHPT  Anemia of CKD    Plan:    - continue HD MWF  - hold home BP meds- gentle UF- okay with fluid challenge if SBP drops to < 90 and symptomatic/orthostatic- be judicious with pain meds  - adjust dialysate  - continue phos binder (home supply)  - continue ADAM with HD    Thank you for allowing us to participate in this patient's care. We will continue to follow.    Vital Signs:  Temp Readings from Last 3 Encounters:   09/18/22 97.9 °F (36.6 °C) (Oral)   09/14/22 98 °F (36.7 °C) (Oral)   08/30/22 98.8 °F (37.1 °C) (Skin)       Pulse Readings from Last 3 Encounters:   09/18/22 76   09/14/22 79   08/30/22 (P) 71       BP Readings from Last 3 Encounters:   09/18/22 (!) 87/52   09/14/22 112/68   08/30/22 (!) (P) 125/58       Weight:  Wt Readings from Last 3 Encounters:   09/15/22 70.8 kg (156 lb)   09/08/22 70.9 kg (156 lb 3.2 oz)   08/30/22 69.4 kg (153 lb)     Medications:  Scheduled Meds:   epoetin nica-ebpx (RETACRIT) injection  50 Units/kg Intravenous Every Mon, Wed, Fri    levothyroxine  88 mcg Oral Before breakfast     Continuous Infusions:  PRN Meds:.acetaminophen, acetaminophen, aluminum-magnesium hydroxide-simethicone, dextrose 10%,  dextrose 10%, glucagon (human recombinant), glucose, glucose, HYDROcodone-acetaminophen, hydrOXYzine HCL, melatonin, morphine, naloxone, ondansetron, prochlorperazine, simethicone, sodium chloride 0.9%, sodium chloride 0.9%  Current Facility-Administered Medications on File Prior to Encounter   Medication Dose Route Frequency Provider Last Rate Last Admin    electrolyte-S (ISOLYTE)   Intravenous Continuous Raz Ruff MD        lactated ringers infusion   Intravenous Continuous Ruy Beltre MD         Current Outpatient Medications on File Prior to Encounter   Medication Sig Dispense Refill    cholecalciferol, vitamin D3, (VITAMIN D3) 25 mcg (1,000 unit) capsule Take 1 capsule (1,000 Units total) by mouth once daily. 10 capsule 0    diltiaZEM (CARDIZEM CD) 120 MG Cp24 Take 1 capsule (120 mg total) by mouth once daily. 90 capsule 3    ferric citrate (AURYXIA) 210 mg iron Tab Take 2 tablets by mouth 3 (three) times daily with meals.      gabapentin (NEURONTIN) 100 MG capsule Take 100 mg by mouth 2 (two) times daily. HCS      levothyroxine (SYNTHROID) 88 MCG tablet TAKE 1 TABLET BEFORE BREAKFAST (Patient taking differently: Take 88 mcg by mouth before breakfast. TAKE 1 TABLET BEFORE BREAKFAST) 90 tablet 3    LORazepam (ATIVAN) 0.5 MG tablet Take 1 tablet (0.5 mg total) by mouth every evening. 90 tablet 1    oxyCODONE (ROXICODONE) 5 MG immediate release tablet Take 1 tablet (5 mg total) by mouth every 4 (four) hours as needed for Pain. 20 tablet 0    promethazine (PHENERGAN) 25 MG tablet TAKE 1 TABLET (25 MG TOTAL) BY MOUTH 2 (TWO) TIMES DAILY AS NEEDED FOR NAUSEA. 60 tablet 0    rOPINIRole (REQUIP) 0.5 MG tablet Take 0.5 mg by mouth every 8 (eight) hours.      sertraline (ZOLOFT) 50 MG tablet Take 1 tablet (50 mg total) by mouth every evening. Take 50 mg by mouth every evening. 90 tablet 1    traMADoL (ULTRAM) 50 mg tablet Take 1 tablet (50 mg total) by mouth every 6 (six) hours as needed. 30 tablet 0    zinc  gluconate 50 mg tablet Take 50 mg by mouth once daily.      hydrALAZINE (APRESOLINE) 50 MG tablet Take 1 tablet (50 mg total) by mouth every 8 (eight) hours. (Patient not taking: Reported on 9/15/2022) 270 tablet 0    [DISCONTINUED] miconazole (MICOTIN) 2 % cream Apply topically 2 (two) times daily. 28 g 0     Review of Systems:  Neg    Physical Exam:  Constitutional: nad, aao x 3  Heart: rrr, no m/r/g, wwp, no edema  Lungs: ctab, no w/r/r/c, no lb  Abdomen: s/nt/nd, +BS, ostomy    Results:  Lab Results   Component Value Date     09/18/2022    K 3.8 09/18/2022     09/18/2022    CO2 22 (L) 09/18/2022    BUN 24 (H) 09/18/2022    CREATININE 5.2 (H) 09/18/2022    CALCIUM 7.7 (L) 09/18/2022    ANIONGAP 6 (L) 09/18/2022    ESTGFRAFRICA 12.5 (A) 07/20/2022    EGFRNONAA 10.9 (A) 07/20/2022       Lab Results   Component Value Date    CALCIUM 7.7 (L) 09/18/2022    PHOS 5.4 (H) 09/07/2022       Recent Labs   Lab 09/18/22  0514   WBC 9.65   RBC 2.87*   HGB 9.5*   HCT 32.0*      *   MCH 33.1*   MCHC 29.7*         I have personally reviewed pertinent radiological imaging and reports.    I have spent > 35 minutes providing care for this patient for the above diagnoses. These services have included chart/data/imaging review, evaluation, exam, formulation of plan, , note preparation, and discussions with staff involved in this patient's care.    Yasmin Parker MD MPH  [facility] [address] [address]  [phone] (p)  [phone] (f)

## 2022-09-18 NOTE — PROGRESS NOTES
[facility] Medicine    Progress Note    Patient Name: Shara Hutchins  MRN: [id number]  Patient Class: IP- Inpatient   Admission Date: 9/15/2022  9:53 AM  Length of Stay: 2  Attending Physician: Brenda Stewart MD  Primary Care Provider: April Horton MD  Face-to-Face encounter date: 09/18/2022  Code status:  Chief Complaint: Fall        Subjective:    HPI:Shara Hutchins is a 72 y.o. White female with known history of Ischemic colitis s/p laprotomy with ischemic sigmoid colon, sigmoid rectum stricture who was discharged yesterday to [facility] presented again with a fall. Patient reports after being discharged she went there and slept. Later she woke up and went to bathroom to clean her ostomy bad and do wound care. When she was returning back, she felt light headed and fell.She did not have her emergency alert necklace and no one checked on her overnight. She laid on the floor overnight and dragged herself out of th bathroom to living room all covered with stools. This morning, facility staff found her and call EMS.In the ER, her vitals and labs are stable. She reports severe weakness and abdominal pain. Patient denies change in vision, hearing, headache, fever, cough, congestion, runny nose, chest pain, shortness of breath, palpitations,  diarrhea, constipation, vomiting, dysuria, hematuria, joint pain and back pain. Hospital medicine consulted for admission.    Interval History:   9/16: Patient visibly weak and short of breath, patient may be a candidate for SNF.  PT/OT recommendations pending.  Home O2 evaluation.  Consult to Case Management .  Check iron, vitamin B12 and folate levels.  No concerns/issues overnight reported by the patient or the nursing staff.    9/17:  Patient had dialysis done yesterday.  Later in the evening nephrostomy tube was noted to be leaking urine, urology consulted and case was discussed.  Awaiting SNF placement    9/18:  Urology reviewed patient and stated  that Cases like this can be managed with serial stent exchange Or she may benefit from a simple nephrectomy but no other recommendations at this time.  No acute overnight event    Review of Systems All other Review of Systems were found to be negative expect for that mentioned already in HPI.     Objective:     Vitals:    09/18/22 0548 09/18/22 0835 09/18/22 0854 09/18/22 1148   BP:   (!) 87/52 123/62   Pulse:   76 76   Resp: 20 16 18 18   Temp:   97.9 °F (36.6 °C) 98.3 °F (36.8 °C)   TempSrc:   Oral Oral   SpO2:   96% 98%   Weight:       Height:            Vitals reviewed.  Constitutional: No distress.   HENT: NC  Head: Atraumatic.   Cardiovascular: Normal rate, regular rhythm and normal heart sounds.   Pulmonary/Chest: Effort normal. No wheezes.   Abdominal: Soft. Bowel sounds are normal. No distension and no mass. No tenderness, colostomy and nephrostomy tube in-situ  Neurological: Alert.   Skin: Skin is warm and dry.   Psych: Appropriate mood and affect    Following labs were Reviewed   CBC:  Recent Labs   Lab 09/18/22  0514   WBC 9.65   HGB 9.5*   HCT 32.0*        CMP:  Recent Labs   Lab 09/18/22  0514   CALCIUM 7.7*      K 3.8   CO2 22*      BUN 24*   CREATININE 5.2*       Micro Results  Microbiology Results (last 7 days)       ** No results found for the last 168 hours. **             Radiology Reports  X-Ray Elbow Complete Left    Result Date: 9/15/2022  HISTORY: Left elbow pain post fall. FINDINGS: 4 views of the left elbow show no acute fracture, dislocation or destructive osseous lesion. The elbow joint spaces are preserved, with no evidence of an elbow joint effusion. The bones are diffusely osteopenic. There are scattered vascular surgical clips and upper arm endovascular stent. IMPRESSION: Negative for acute fracture or dislocation. Electronically signed by:  Mike Martinez MD  9/15/2022 11:14 AM CDT Workstation: [id number]    X-Ray Elbow Complete Left    Result Date: 8/27/2022  Four  views left elbow Clinical history is pain after fall There are no fractures, dislocations or acute osseous abnormalities. The joint spaces are maintained. There are surgical clips in the anterior medial soft tissues adjacent to the distal humerus. IMPRESSION: No evidence of fracture Electronically signed by:  Meredith Colunga MD  8/27/2022 10:24 AM CDT Workstation: [id number]    CT Head Without Contrast    Result Date: 9/15/2022  CMS MANDATED QUALITY DATA - CT RADIATION - 436 All CT scans at this facility utilize dose modulation, iterative reconstruction, and/or weight based dosing when appropriate to reduce radiation dose to as low as reasonably achievable. Reason: Head trauma, intracranial venous injury suspected Fell this am no loc TECHNIQUE: Head CT without IV contrast. COMPARISON: CT head August 27, 2022 FINDINGS: Gray-white differentiation is maintained without hemorrhage, midline shift, or mass effect. The ventricles and cisterns are maintained. Calvarium is intact. Visualized sinuses are clear. IMPRESSION: No acute intracranial abnormality. Electronically signed by:  Elias Sherwood DO  9/15/2022 11:03 AM CDT Workstation: [id number]    CT Head Without Contrast    Result Date: 8/27/2022  All CT scans at this facility used dose modulation, iterative reconstruction and/or weight-based dosing when appropriate to reduce radiation doses  as low as reasonably achievable. CLINICAL INFORMATION:  FALL FINDINGS:   The ventricles and sulci are normal in size and configuration for age.  There is no intraparenchymal hemorrhage, mass or midline shift.  There are no extra-axial fluid collections.  The paranasal sinus and mastoid air cells are clear. IMPRESSION:   NO ACUTE INTRACRANIAL PROCESS. Electronically signed by:  Meredith Colunga MD  8/27/2022 9:50 AM CDT Workstation: [id number]    CT Cervical Spine Without Contrast    Result Date: 8/27/2022  All CT scans at this facility used dose modulation, iterative  reconstruction and/or weight-based dosing when appropriate to reduce radiation doses  as low as reasonably achievable. CT scan of the cervical spine Clinical history is FALL Axial images the cervical spine were obtained with sagittal and coronal reconstructed images. There is reversal of normal cervical lordosis. There is been instrumented anterior cervical fusion at C4-5 with cortical plate and fixating screws. There is interbody disc expander at C4-5 with bony bridging. There is bony fusion at C5-6 and C6-7. There is mild anterolisthesis of C7 on T1 with disc space narrowing. There has been posterior fusion on the right at C6-7 with bony fusion of the right facet. There is bony fusion of the right facet at C4-5 and C5-6 that of the left facet at C4-5. There is disc space narrowing and anterior spurring at C2-3. The odontoid process is intact and the lateral masses of C1 are symmetrical. There are no fractures or acute osseous abnormalities. The facet joints are aligned. The paraspinous soft tissues are normal. There is no epidural fluid collection. The lung apices are clear. IMPRESSION: No evidence of fracture or acute osseous abnormality Prior anterior and posterior fusion Mild anterolisthesis of C7 on T1 with disc space narrowing Degenerative changes at C2-3 Electronically signed by:  Meredith Colunga MD  8/27/2022 9:54 AM CDT Workstation: [id number]    X-ray Chest AP Portable    Result Date: 9/15/2022  Reason: Chest pain. FINDINGS: Portable chest with comparison chest x-ray September 7, 2022 show normal cardiomediastinal silhouette. Lungs are clear. Pulmonary vasculature is normal. No acute osseous abnormality. IMPRESSION: No acute cardiopulmonary abnormality. Electronically signed by:  Elias Sherwood DO  9/15/2022 11:12 AM CDT Workstation: [id number]    X-Ray Chest AP Portable    Result Date: 9/7/2022  XR CHEST 1 VIEW CLINICAL HISTORY: 72 years Female Sepsis Comparison is made to prior study at 9:56 AM  FINDINGS: Cardiomediastinal silhouette is within normal limits. Lungs are normally expanded with no airspace consolidation. No pleural effusion or pneumothorax. No acute osseous abnormality. There is been prior cervical fusion. IMPRESSION: No acute pulmonary process. Electronically signed by:  Meredith Colunga MD  9/7/2022 12:18 PM CDT Workstation: [id number]    X-ray Chest AP Portable    Result Date: 9/7/2022  Chest single view Clinical data:Nausea, vomiting. Comparison to July 7. FINDINGS: AP view of the chest demonstrates no cardiac, pulmonary, or osseous abnormalities. IMPRESSION: 1. Normal chest single view. Electronically signed by:  Dixon Thomas MD  9/7/2022 10:24 AM CDT Workstation: [id number]    US Carotid Bilateral    Result Date: 9/15/2022  CMS MANDATED QUALITY DATA - CAROTID - 195 All measurements and percent stenosis described below were determined using NASCET criteria or criteria similar to NASCET, as defined by the Society of Radiologists in Ultrasound Consensus Conference, Radiology, 2003 Bilateral carotid doppler evaluation HISTORY: Dizziness, carotid stenosis. Grey scale, duplex and color doppler interrogation are performed and interpreted in accordance with NASCET criteria. Scattered atheromatous changes are present within the extracranial carotid circulation. Duplex and color Doppler interrogation fail to demonstrate evidence of hemodynamically significant stenosis on either side. Antegrade flow is observed within both vertebral arteries. IMPRESSION: Scattered bilateral atheromatous changes without evidence of hemodynamically significant carotid arterial stenosis. Electronically signed by:  Janneth Collzao MD  9/15/2022 2:33 PM CDT Workstation: [id number]    US Hemodialysis Access    Result Date: 9/12/2022  REASON: stenosis TECHNIQUE: Grayscale and color Doppler ultrasound of the left upper extremity vasculature. COMPARISON: Ultrasound January 17, 2022. FINDINGS: Left AV fistula noted with  patent anastomosis is patent. Elevated peak systolic velocities at the proximal anastomosis are similar to previous exam, measuring up to 462.2 cm/s. No vascular occlusions observed. IMPRESSION: As above. Electronically signed by:  Elias Sherwood DO  9/12/2022 4:44 PM CDT Workstation: [id number]    SURG FL Surgery Fluoro Usage    Result Date: 8/30/2022  See OP Notes for results. IMPRESSION: See OP Notes for results. This procedure was auto-finalized by: Virtual Radiologist    CT Abdomen Pelvis  Without Contrast    Result Date: 9/7/2022  CMS MANDATED QUALITY DATA - CT RADIATION - 436 All CT scans at this facility utilize dose modulation, iterative reconstruction, and/or weight based dosing when appropriate to reduce radiation dose to as low as reasonably achievable. HISTORY: GI bleed, abdominal pain COMPARISON: 7/7/2022 FINDINGS: Noncontrast axial images were obtained. Nonenhanced study is tailored for the detection of urolithiasis, and is insensitive for abnormalities of the solid organs, vasculature and hollow viscera. CT ABDOMEN: There is coronary artery calcification. The lung bases are clear. There is moderate portal venous air within the intrahepatic portal veins and main portal vein. There is diffuse wall thickening of the colon with pneumatosis within the descending colon. Findings are suggestive of ischemic colitis. There is air within a vein within the left lower quadrant. There is edema within the pericolonic fat. The small bowel is not dilated. There are anastomotic sutures in the right lower quadrant. The liver, spleen and pancreas have a normal noncontrast appearance. The gallbladder is absent. The adrenal glands are normal. The kidneys are small in size with numerous nonobstructing renal stones. There is a right ureteral stent in place. There is resolution of the right hydronephrosis. There is moderate vascular calcification of the aorta and its branches. There is no mesenteric or retroperitoneal  adenopathy. There is levoscoliosis of the lumbar spine with multilevel degenerative changes. There are degenerative changes of both hips. CT PELVIS: There is pneumatosis within the descending colon and sigmoid colon with diffuse wall thickening of the colon suggestive of ischemic colitis. There is minimal free fluid in the pelvis. The bladder is normal. The uterus is absent. There is moderate vascular calcification of the iliac arteries. IMPRESSION: Portal venous air with pneumatosis within the descending colon and sigmoid colon with diffuse wall thickening of the colon. Findings are suggestive of ischemic colitis. There is no free air Minimal free fluid within the pelvis Bilateral renal calculi with right ureteral stent in place with resolution of the right hydronephrosis Moderate vascular calcification of the aorta and iliac arteries These findings were called to Dr. rausch in the emergency department at 11:45 AM Electronically signed by:  Meredith Colunga MD  9/7/2022 11:50 AM CDT Workstation: [id number]       Meds  Scheduled Meds:   epoetin nica-ebpx (RETACRIT) injection  50 Units/kg Intravenous Every Mon, Wed, Fri    levothyroxine  88 mcg Oral Before breakfast    magnesium sulfate IVPB  2 g Intravenous Once     Continuous Infusions:      PRN Meds:.acetaminophen, acetaminophen, aluminum-magnesium hydroxide-simethicone, dextrose 10%, dextrose 10%, glucagon (human recombinant), glucose, glucose, HYDROcodone-acetaminophen, hydrOXYzine HCL, melatonin, morphine, naloxone, ondansetron, prochlorperazine, simethicone, sodium chloride 0.9%, sodium chloride 0.9%.    Assessment & Plan:     Active Hospital Problems    Diagnosis    Fall    ESRD on dialysis MWF     Plan:   - continue current management  - continue to hold blood pressure medications  - PT/OT  - case management consult for SNF placement  - nephrology on board      Discharge Planning:   Is the patient medically ready for discharge?: no    Reason for patient still  in hospital (select all that apply): Patient trending condition and Treatment    Above encounter included review of the medical records, interviewing and examining the patient face-to-face, discussion with family and other health care providers, ordering and interpreting lab/test results and formulating a plan of care.     Medical Decision Making:      [_] Low Complexity  [_] Moderate Complexity  [x] High Complexity      Bernda Stewart MD  Department of Hospital Medicine   Critical access hospital

## 2022-09-19 NOTE — SUBJECTIVE & OBJECTIVE
Review of Systems   Constitutional:  Positive for fatigue. Negative for fever.   Respiratory:  Negative for shortness of breath.    Cardiovascular:  Negative for palpitations.   Gastrointestinal:  Negative for abdominal pain.   Neurological:  Negative for light-headedness.   All other systems reviewed and are negative.  Objective:     Vital Signs (Most Recent):  Temp: 98 °F (36.7 °C) (09/19/22 1240)  Pulse: 77 (09/19/22 1330)  Resp: 16 (09/19/22 1240)  BP: 131/64 (09/19/22 1330)  SpO2: 98 % (09/19/22 1207) Vital Signs (24h Range):  Temp:  [98 °F (36.7 °C)-100 °F (37.8 °C)] 98 °F (36.7 °C)  Pulse:  [74-87] 77  Resp:  [16-20] 16  SpO2:  [96 %-98 %] 98 %  BP: (114-142)/(56-77) 131/64     Weight: 70.8 kg (156 lb)  Body mass index is 24.43 kg/m².    Intake/Output Summary (Last 24 hours) at 9/19/2022 1356  Last data filed at 9/19/2022 0600  Gross per 24 hour   Intake --   Output 700 ml   Net -700 ml      Physical Exam  Vitals and nursing note reviewed.   HENT:      Head: Normocephalic.      Nose: Nose normal.      Mouth/Throat:      Mouth: Mucous membranes are moist.   Cardiovascular:      Rate and Rhythm: Normal rate.   Pulmonary:      Effort: Pulmonary effort is normal.   Abdominal:      Palpations: Abdomen is soft.      Comments: Colostomu and nepohrostomy bag insitu   Musculoskeletal:         General: Normal range of motion.   Neurological:      General: No focal deficit present.      Mental Status: She is alert and oriented to person, place, and time. Mental status is at baseline.       Significant Labs: All pertinent labs within the past 24 hours have been reviewed.  Blood Culture: No results for input(s): LABBLOO in the last 48 hours.  BMP:   Recent Labs   Lab 09/19/22  0608   GLU 78      K 4.4      CO2 22*   BUN 32*   CREATININE 6.3*   CALCIUM 7.9*   MG 2.1     CBC:   Recent Labs   Lab 09/18/22  0514 09/19/22  0608   WBC 9.65 9.47   HGB 9.5* 8.9*   HCT 32.0* 28.6*    217     Magnesium:   Recent  Labs   Lab 09/18/22  0514 09/19/22  0608   MG 1.6 2.1     Urine Studies: No results for input(s): COLORU, APPEARANCEUA, PHUR, SPECGRAV, PROTEINUA, GLUCUA, KETONESU, BILIRUBINUA, OCCULTUA, NITRITE, UROBILINOGEN, LEUKOCYTESUR, RBCUA, WBCUA, BACTERIA, SQUAMEPITHEL, HYALINECASTS in the last 48 hours.    Invalid input(s): WRIGHTSUR    Significant Imaging: I have reviewed all pertinent imaging results/findings within the past 24 hours.  Imaging Results              US Carotid Bilateral (Final result)  Result time 09/15/22 14:33:08      Final result by Janneth Collazo IV, MD (09/15/22 14:33:08)                   Narrative:    CMS MANDATED QUALITY DATA - CAROTID - 195    All measurements and percent stenosis described below were determined using NASCET criteria or criteria similar to NASCET, as defined by the Society of Radiologists in Ultrasound Consensus Conference, Radiology, 2003    Bilateral carotid doppler evaluation    HISTORY: Dizziness, carotid stenosis.    Grey scale, duplex and color doppler interrogation are performed and interpreted in accordance with NASCET criteria. Scattered atheromatous changes are present within the extracranial carotid circulation. Duplex and color Doppler interrogation fail to demonstrate evidence of hemodynamically significant stenosis on either side. Antegrade flow is observed within both vertebral arteries.    IMPRESSION:    Scattered bilateral atheromatous changes without evidence of hemodynamically significant carotid arterial stenosis.    Electronically signed by:  Janneth Collazo MD  9/15/2022 2:33 PM CDT Workstation: [id number]                                     X-ray Chest AP Portable (Final result)  Result time 09/15/22 11:12:46      Final result by Elias Sherwood MD (09/15/22 11:12:46)                   Narrative:    Reason: Chest pain.    FINDINGS:    Portable chest with comparison chest x-ray September 7, 2022 show normal cardiomediastinal silhouette.  Lungs are clear.  Pulmonary vasculature is normal. No acute osseous abnormality.    IMPRESSION:    No acute cardiopulmonary abnormality.    Electronically signed by:  Elias Sherwood DO  9/15/2022 11:12 AM CDT Workstation: [id number]                                     X-Ray Elbow Complete Left (Final result)  Result time 09/15/22 11:14:18      Final result by Mike Martinez MD (09/15/22 11:14:18)                   Narrative:    HISTORY: Left elbow pain post fall.    FINDINGS: 4 views of the left elbow show no acute fracture, dislocation or destructive osseous lesion. The elbow joint spaces are preserved, with no evidence of an elbow joint effusion. The bones are diffusely osteopenic. There are scattered vascular surgical clips and upper arm endovascular stent.    IMPRESSION: Negative for acute fracture or dislocation.    Electronically signed by:  Mike Martinez MD  9/15/2022 11:14 AM CDT Workstation: [id number]                                     CT Head Without Contrast (Final result)  Result time 09/15/22 11:03:49      Final result by Elias Sherwood MD (09/15/22 11:03:49)                   Narrative:    CMS MANDATED QUALITY DATA - CT RADIATION - 436    All CT scans at this facility utilize dose modulation, iterative reconstruction, and/or weight based dosing when appropriate to reduce radiation dose to as low as reasonably achievable.        Reason: Head trauma, intracranial venous injury suspected Fell this am no loc    TECHNIQUE: Head CT without IV contrast.    COMPARISON: CT head August 27, 2022    FINDINGS:    Gray-white differentiation is maintained without hemorrhage, midline shift, or mass effect.    The ventricles and cisterns are maintained.    Calvarium is intact. Visualized sinuses are clear.    IMPRESSION:    No acute intracranial abnormality.        Electronically signed by:  Elias Sherwood DO  9/15/2022 11:03 AM CDT Workstation: [id number]

## 2022-09-19 NOTE — HPI
Shara Hutchins is a 72 y.o. White female with known history of Ischemic colitis s/p laprotomy with ischemic sigmoid colon, sigmoid rectum stricture who was discharged yesterday to [facility] presented again with a fall. Patient reports after being discharged she went there and slept. Later she woke up and went to bathroom to clean her ostomy bad and do wound care. When she was returning back, she felt light headed and fell.She did not have her emergency alert necklace and no one checked on her overnight. She laid on the floor overnight and dragged herself out of th bathroom to living room all covered with stools. This morning, facility staff found her and call EMS.In the ER, her vitals and labs are stable. She reports severe weakness and abdominal pain. Patient denies change in vision, hearing, headache, fever, cough, congestion, runny nose, chest pain, shortness of breath, palpitations,  diarrhea, constipation, vomiting, dysuria, hematuria, joint pain and back pain. Hospital medicine consulted for admission.

## 2022-09-19 NOTE — ASSESSMENT & PLAN NOTE
Probably secondary to hypotension  Continue to hold home blood pressure medications   awaiting SNF placement

## 2022-09-19 NOTE — RESPIRATORY THERAPY
09/18/22 2028   Patient Assessment/Suction   Level of Consciousness (AVPU) alert   Respiratory Effort Normal;Unlabored   Expansion/Accessory Muscles/Retractions no use of accessory muscles   All Lung Fields Breath Sounds clear   PRE-TX-O2   O2 Device (Oxygen Therapy) nasal cannula   $ Is the patient on Low Flow Oxygen? Yes   Flow (L/min) 2   SpO2 96 %   Pulse Oximetry Type Intermittent   $ Pulse Oximetry - Multiple Charge Pulse Oximetry - Multiple   Education   $ Education DME Oxygen;15 min   Respiratory Evaluation   $ Care Plan Tech Time 15 min

## 2022-09-19 NOTE — PROGRESS NOTES
HD tx tolerated well   Net UF 2 liters       09/19/22 1600   Handoff Report   Received From Kaylee Lu   Given To Namita   Vital Signs   Temp 98 °F (36.7 °C)   Pulse 67   Resp 18   SpO2 98 %   BP (!) 128/58   Assessments (Pre/Post)   Blood Liters Processed (BLP) 56   Transport Modality not applicable   Level of Consciousness (AVPU) alert   Dialyzer Clearance moderately streaked        Hemodialysis AV Fistula Left upper arm   No Placement Date or Time found.   Present Prior to Hospital Arrival?: Yes  Location: Left upper arm   Site Assessment Clean;Dry;Intact   Patency Present;Thrill;Bruit   Status Deaccessed   Flows Good   Dressing Intervention First dressing   Dressing Status Clean;Dry;Intact   Site Condition No complications   Dressing Gauze   Post-Hemodialysis Assessment   Rinseback Volume (mL) 250 mL   Blood Volume Processed (Liters) 56 L   Dialyzer Clearance Moderately streaked   Duration of Treatment 180 minutes   Additional Fluid Intake (mL) 500 mL   Total UF (mL) 2500 mL   Net Fluid Removal 2000   Patient Response to Treatment Tolerated well   Post-Treatment Weight 73.1 kg (161 lb 2.5 oz)   Treatment Weight Change -2   Arterial bleeding stop time (min) 6 min   Venous bleeding stop time (min) 6 min   Post-Hemodialysis Comments Tx complete, all blood reinfused per protocol, no issues

## 2022-09-19 NOTE — PLAN OF CARE
1021 -  met with Pt at bedside to deliver list of skilled-nursing facilities.  explained skilled nursing facilities and Pt verbalized agreement to discharge plan. Pt selected [facility], [facility], and [facility].  placed referrals to skilled-nursing facilities.  sent secure chat message to Anita ([facility]) to review Pt.  to follow-up.    1027 - [facility] sent reply via [employer]; Pt cannot be accepted due to [facility] not having a [facility].

## 2022-09-19 NOTE — PT/OT/SLP PROGRESS
Occupational Therapy   Treatment    Name: Shara Hutchisn  MRN: [id number]  Admitting Diagnosis:  Fall       Recommendations:     Discharge Recommendations: nursing facility, skilled  Discharge Equipment Recommendations:  none  Barriers to discharge:       Assessment:     Shara Hutchins is a 72 y.o. female with a medical diagnosis of Fall.  Performance deficits affecting function are weakness, impaired endurance, impaired self care skills, impaired functional mobility, gait instability, impaired balance, decreased upper extremity function, decreased lower extremity function, impaired cardiopulmonary response to activity.     Rehab Prognosis:  Good; patient would benefit from acute skilled OT services to address these deficits and reach maximum level of function.       Plan:     Patient to be seen 5 x/week to address the above listed problems via self-care/home management, therapeutic activities, therapeutic exercises  Plan of Care Expires: 10/17/22  Plan of Care Reviewed with: patient    Subjective     Pain/Comfort:  Pain Rating 1: 0/10  Pain Rating Post-Intervention 1: 0/10    Objective:     Communicated with: nurse prior to session.  Patient found HOB elevated with peripheral IV, colostomy, oxygen, nephrostomy, bed alarm upon OT entry to room.    General Precautions: Standard, fall   Orthopedic Precautions:N/A   Braces: N/A  Respiratory Status: Nasal cannula, flow 2 L/min     Occupational Performance:     Activities of Daily Living:  Grooming: stand by assistance brushing teeth.      [facility] 6 Click ADL:      Treatment & Education:  Role of OT, safety awareness, importance of functional mobility, call bell use.  Pt waiting on dialysis to come during session.    Patient left HOB elevated with all lines intact, call button in reach, and bed alarm on    GOALS:   Multidisciplinary Problems       Occupational Therapy Goals          Problem: Occupational Therapy    Goal Priority Disciplines Outcome Interventions   Occupational  Therapy Goal     OT, PT/OT     Description: Goals to be met by: 10/17/22     Patient will increase functional independence with ADLs by performing:    UE Dressing with Supervision.  LE Dressing with Supervision.  Grooming while standing at sink with Supervision.  Toileting from toilet with Supervision for hygiene and clothing management.   Toilet transfer to toilet with Supervision.                         Time Tracking:     OT Date of Treatment: 09/19/22  OT Start Time: 1105  OT Stop Time: 1116  OT Total Time (min): 11 min    Billable Minutes:Self Care/Home Management 11    OT/SAMANTHA: OT          9/19/2022

## 2022-09-19 NOTE — PROGRESS NOTES
[facility] Medicine  Progress Note    Patient Name: Shara Hutchins  MRN: [id number]  Patient Class: IP- Inpatient   Admission Date: 9/15/2022  Length of Stay: 3 days  Attending Physician: Brenda Stewart MD  Primary Care Provider: April Horton MD        Subjective:     Principal Problem:Fall        HPI:  Shara Hutchins is a 72 y.o. White female with known history of Ischemic colitis s/p laprotomy with ischemic sigmoid colon, sigmoid rectum stricture who was discharged yesterday to [facility] presented again with a fall. Patient reports after being discharged she went there and slept. Later she woke up and went to bathroom to clean her ostomy bad and do wound care. When she was returning back, she felt light headed and fell.She did not have her emergency alert necklace and no one checked on her overnight. She laid on the floor overnight and dragged herself out of th bathroom to living room all covered with stools. This morning, facility staff found her and call EMS.In the ER, her vitals and labs are stable. She reports severe weakness and abdominal pain. Patient denies change in vision, hearing, headache, fever, cough, congestion, runny nose, chest pain, shortness of breath, palpitations,  diarrhea, constipation, vomiting, dysuria, hematuria, joint pain and back pain. Hospital medicine consulted for admission.      Overview/Hospital Course:  9/16: Patient visibly weak and short of breath, patient may be a candidate for SNF.  PT/OT recommendations pending.  Home O2 evaluation.  Consult to Case Management .  Check iron, vitamin B12 and folate levels.  No concerns/issues overnight reported by the patient or the nursing staff.     9/17:  Patient had dialysis done yesterday.  Later in the evening nephrostomy tube was noted to be leaking urine, urology consulted and case was discussed.  Awaiting SNF placement     9/18:  Urology reviewed patient and stated that Cases like this can be managed with  serial stent exchange Or she may benefit from a simple nephrectomy but no other recommendations at this time.  No acute overnight event    9/19:  Awaiting placement      Review of Systems   Constitutional:  Positive for fatigue. Negative for fever.   Respiratory:  Negative for shortness of breath.    Cardiovascular:  Negative for palpitations.   Gastrointestinal:  Negative for abdominal pain.   Neurological:  Negative for light-headedness.   All other systems reviewed and are negative.  Objective:     Vital Signs (Most Recent):  Temp: 98 °F (36.7 °C) (09/19/22 1240)  Pulse: 77 (09/19/22 1330)  Resp: 16 (09/19/22 1240)  BP: 131/64 (09/19/22 1330)  SpO2: 98 % (09/19/22 1207) Vital Signs (24h Range):  Temp:  [98 °F (36.7 °C)-100 °F (37.8 °C)] 98 °F (36.7 °C)  Pulse:  [74-87] 77  Resp:  [16-20] 16  SpO2:  [96 %-98 %] 98 %  BP: (114-142)/(56-77) 131/64     Weight: 70.8 kg (156 lb)  Body mass index is 24.43 kg/m².    Intake/Output Summary (Last 24 hours) at 9/19/2022 1356  Last data filed at 9/19/2022 0600  Gross per 24 hour   Intake --   Output 700 ml   Net -700 ml      Physical Exam  Vitals and nursing note reviewed.   HENT:      Head: Normocephalic.      Nose: Nose normal.      Mouth/Throat:      Mouth: Mucous membranes are moist.   Cardiovascular:      Rate and Rhythm: Normal rate.   Pulmonary:      Effort: Pulmonary effort is normal.   Abdominal:      Palpations: Abdomen is soft.      Comments: Colostomu and nepohrostomy bag insitu   Musculoskeletal:         General: Normal range of motion.   Neurological:      General: No focal deficit present.      Mental Status: She is alert and oriented to person, place, and time. Mental status is at baseline.       Significant Labs: All pertinent labs within the past 24 hours have been reviewed.  Blood Culture: No results for input(s): LABBLOO in the last 48 hours.  BMP:   Recent Labs   Lab 09/19/22  0608   GLU 78      K 4.4      CO2 22*   BUN 32*   CREATININE 6.3*    CALCIUM 7.9*   MG 2.1     CBC:   Recent Labs   Lab 09/18/22  0514 09/19/22  0608   WBC 9.65 9.47   HGB 9.5* 8.9*   HCT 32.0* 28.6*    217     Magnesium:   Recent Labs   Lab 09/18/22  0514 09/19/22  0608   MG 1.6 2.1     Urine Studies: No results for input(s): COLORU, APPEARANCEUA, PHUR, SPECGRAV, PROTEINUA, GLUCUA, KETONESU, BILIRUBINUA, OCCULTUA, NITRITE, UROBILINOGEN, LEUKOCYTESUR, RBCUA, WBCUA, BACTERIA, SQUAMEPITHEL, HYALINECASTS in the last 48 hours.    Invalid input(s): WRIGHTSUR    Significant Imaging: I have reviewed all pertinent imaging results/findings within the past 24 hours.  Imaging Results              US Carotid Bilateral (Final result)  Result time 09/15/22 14:33:08      Final result by Janneth Collazo IV, MD (09/15/22 14:33:08)                   Narrative:    CMS MANDATED QUALITY DATA - CAROTID - 195    All measurements and percent stenosis described below were determined using NASCET criteria or criteria similar to NASCET, as defined by the Society of Radiologists in Ultrasound Consensus Conference, Radiology, 2003    Bilateral carotid doppler evaluation    HISTORY: Dizziness, carotid stenosis.    Grey scale, duplex and color doppler interrogation are performed and interpreted in accordance with NASCET criteria. Scattered atheromatous changes are present within the extracranial carotid circulation. Duplex and color Doppler interrogation fail to demonstrate evidence of hemodynamically significant stenosis on either side. Antegrade flow is observed within both vertebral arteries.    IMPRESSION:    Scattered bilateral atheromatous changes without evidence of hemodynamically significant carotid arterial stenosis.    Electronically signed by:  Janneth Collazo MD  9/15/2022 2:33 PM CDT Workstation: [id number]                                     X-ray Chest AP Portable (Final result)  Result time 09/15/22 11:12:46      Final result by Elias Sherwood MD (09/15/22 11:12:46)                    Narrative:    Reason: Chest pain.    FINDINGS:    Portable chest with comparison chest x-ray September 7, 2022 show normal cardiomediastinal silhouette.  Lungs are clear. Pulmonary vasculature is normal. No acute osseous abnormality.    IMPRESSION:    No acute cardiopulmonary abnormality.    Electronically signed by:  Elias Sherwood DO  9/15/2022 11:12 AM CDT Workstation: [id number]                                     X-Ray Elbow Complete Left (Final result)  Result time 09/15/22 11:14:18      Final result by Mike Martinez MD (09/15/22 11:14:18)                   Narrative:    HISTORY: Left elbow pain post fall.    FINDINGS: 4 views of the left elbow show no acute fracture, dislocation or destructive osseous lesion. The elbow joint spaces are preserved, with no evidence of an elbow joint effusion. The bones are diffusely osteopenic. There are scattered vascular surgical clips and upper arm endovascular stent.    IMPRESSION: Negative for acute fracture or dislocation.    Electronically signed by:  Mike Martinez MD  9/15/2022 11:14 AM CDT Workstation: [id number]                                     CT Head Without Contrast (Final result)  Result time 09/15/22 11:03:49      Final result by Elias Sherwood MD (09/15/22 11:03:49)                   Narrative:    CMS MANDATED QUALITY DATA - CT RADIATION - 436    All CT scans at this facility utilize dose modulation, iterative reconstruction, and/or weight based dosing when appropriate to reduce radiation dose to as low as reasonably achievable.        Reason: Head trauma, intracranial venous injury suspected Fell this am no loc    TECHNIQUE: Head CT without IV contrast.    COMPARISON: CT head August 27, 2022    FINDINGS:    Gray-white differentiation is maintained without hemorrhage, midline shift, or mass effect.    The ventricles and cisterns are maintained.    Calvarium is intact. Visualized sinuses are clear.    IMPRESSION:    No acute intracranial  abnormality.        Electronically signed by:  Elias Sherwood DO  9/15/2022 11:03 AM CDT Workstation: [id number]                                        Assessment/Plan:      * Fall  Probably secondary to hypotension  Continue to hold home blood pressure medications   awaiting SNF placement      ESRD on dialysis MWF  Nephrology on board, continue dialysis      Hypothyroidism  Continue home medication of levothyroxine        VTE Risk Mitigation (From admission, onward)    None          Discharge Planning   RODRIGO: 9/20/2022     Code Status: Full Code   Is the patient medically ready for discharge?:     Reason for patient still in hospital (select all that apply): Pending disposition  Discharge Plan A: Skilled Nursing Facility                  Brenda Stewart MD  Department of Hospital Medicine   [facility]

## 2022-09-19 NOTE — PROGRESS NOTES
INPATIENT NEPHROLOGY Progress Note  [facility]    Patient Name: Shara Hutchins  Date: 09/19/2022    Reason for consultation: ESRD    Chief Complaint:   Chief Complaint   Patient presents with    Fall       History of Present Illness:  72-year-old ESRD on HD MWF and history of multiple abdominal surgeries for ischemic colitis who was recently discharged with ischemic colitis s/p surgery on 9/8, readmitted with gen weakness, abd pain and fall, no exac/reliev factors, consulted for dialysis.     Interval History:  9/16- I think she is dry- hold BP meds- UF even  9/17- awaiting SNF  9/18- BP remains low, pain meds helping, leakage around NT noted along with urology note, feels weak  9/19  overall feels better.  The drain is bothering her.  No sob or cp.  Has intermittent nausea    Plan of Care:    Assessment:  ESRD on HD MWF  Hypotension/Baseline HTN  Hypokalemia  SHPT  Anemia of CKD    Plan:    - continue HD MWF  - hold home BP meds- gentle UF- okay with fluid challenge if SBP drops to < 90 and symptomatic/orthostatic- be judicious with pain meds  - adjust dialysate  - continue phos binder (home supply)  - increase epogen to 10k with dialysis  --trial of low dose midodrine for hypotension    Thank you for allowing us to participate in this patient's care. We will continue to follow.    Vital Signs:  Temp Readings from Last 3 Encounters:   09/19/22 98.9 °F (37.2 °C) (Oral)   09/14/22 98 °F (36.7 °C) (Oral)   08/30/22 98.8 °F (37.1 °C) (Skin)       Pulse Readings from Last 3 Encounters:   09/19/22 81   09/14/22 79   08/30/22 (P) 71       BP Readings from Last 3 Encounters:   09/19/22 (!) 141/77   09/14/22 112/68   08/30/22 (!) (P) 125/58       Weight:  Wt Readings from Last 3 Encounters:   09/15/22 70.8 kg (156 lb)   09/08/22 70.9 kg (156 lb 3.2 oz)   08/30/22 69.4 kg (153 lb)     Medications:  Scheduled Meds:   epoetin nica-ebpx (RETACRIT) injection  50 Units/kg Intravenous Every Mon, Wed, Fri     levothyroxine  88 mcg Oral Before breakfast    midodrine  2.5 mg Oral TID     Continuous Infusions:  PRN Meds:.acetaminophen, acetaminophen, aluminum-magnesium hydroxide-simethicone, dextrose 10%, dextrose 10%, glucagon (human recombinant), glucose, glucose, HYDROcodone-acetaminophen, hydrOXYzine HCL, melatonin, morphine, naloxone, ondansetron, prochlorperazine, simethicone, sodium chloride 0.9%, sodium chloride 0.9%  Current Facility-Administered Medications on File Prior to Encounter   Medication Dose Route Frequency Provider Last Rate Last Admin    electrolyte-S (ISOLYTE)   Intravenous Continuous Raz Ruff MD        lactated ringers infusion   Intravenous Continuous Ruy Beltre MD         Current Outpatient Medications on File Prior to Encounter   Medication Sig Dispense Refill    cholecalciferol, vitamin D3, (VITAMIN D3) 25 mcg (1,000 unit) capsule Take 1 capsule (1,000 Units total) by mouth once daily. 10 capsule 0    diltiaZEM (CARDIZEM CD) 120 MG Cp24 Take 1 capsule (120 mg total) by mouth once daily. 90 capsule 3    ferric citrate (AURYXIA) 210 mg iron Tab Take 2 tablets by mouth 3 (three) times daily with meals.      gabapentin (NEURONTIN) 100 MG capsule Take 100 mg by mouth 2 (two) times daily. HCS      levothyroxine (SYNTHROID) 88 MCG tablet TAKE 1 TABLET BEFORE BREAKFAST (Patient taking differently: Take 88 mcg by mouth before breakfast. TAKE 1 TABLET BEFORE BREAKFAST) 90 tablet 3    LORazepam (ATIVAN) 0.5 MG tablet Take 1 tablet (0.5 mg total) by mouth every evening. 90 tablet 1    oxyCODONE (ROXICODONE) 5 MG immediate release tablet Take 1 tablet (5 mg total) by mouth every 4 (four) hours as needed for Pain. 20 tablet 0    promethazine (PHENERGAN) 25 MG tablet TAKE 1 TABLET (25 MG TOTAL) BY MOUTH 2 (TWO) TIMES DAILY AS NEEDED FOR NAUSEA. 60 tablet 0    rOPINIRole (REQUIP) 0.5 MG tablet Take 0.5 mg by mouth every 8 (eight) hours.      sertraline (ZOLOFT) 50 MG tablet Take 1 tablet (50 mg  total) by mouth every evening. Take 50 mg by mouth every evening. 90 tablet 1    traMADoL (ULTRAM) 50 mg tablet Take 1 tablet (50 mg total) by mouth every 6 (six) hours as needed. 30 tablet 0    zinc gluconate 50 mg tablet Take 50 mg by mouth once daily.      hydrALAZINE (APRESOLINE) 50 MG tablet Take 1 tablet (50 mg total) by mouth every 8 (eight) hours. (Patient not taking: Reported on 9/15/2022) 270 tablet 0    [DISCONTINUED] miconazole (MICOTIN) 2 % cream Apply topically 2 (two) times daily. 28 g 0     Review of Systems:  Neg    Physical Exam:  Constitutional: nad, aao x 3  Heart: rrr, no m/r/g, wwp, no edema  Lungs: ctab, no w/r/r/c, no lb  Abdomen: s/nt/nd, +BS, ostomy    Results:  Lab Results   Component Value Date     09/19/2022    K 4.4 09/19/2022     09/19/2022    CO2 22 (L) 09/19/2022    BUN 32 (H) 09/19/2022    CREATININE 6.3 (H) 09/19/2022    CALCIUM 7.9 (L) 09/19/2022    ANIONGAP 4 (L) 09/19/2022    ESTGFRAFRICA 12.5 (A) 07/20/2022    EGFRNONAA 10.9 (A) 07/20/2022       Lab Results   Component Value Date    CALCIUM 7.9 (L) 09/19/2022    PHOS 5.4 (H) 09/07/2022       Recent Labs   Lab 09/19/22  0608   WBC 9.47   RBC 2.63*   HGB 8.9*   HCT 28.6*      *   MCH 33.8*   MCHC 31.1*         I have personally reviewed pertinent radiological imaging and reports.    I have spent > 35 minutes providing care for this patient for the above diagnoses. These services have included chart/data/imaging review, evaluation, exam, formulation of plan, , note preparation, and discussions with staff involved in this patient's care.    Raúl Dietz MD  Nephrology  [facility]  [phone] n

## 2022-09-19 NOTE — PT/OT/SLP PROGRESS
Physical Therapy Treatment    Patient Name:  Shara Hutchins   MRN:  [id number]    Recommendations:     Discharge Recommendations:  nursing facility, skilled, home health PT (dependent on medical status and progress towards goals)   Discharge Equipment Recommendations: walker, rolling   Barriers to discharge:  increase assist with mobility    Assessment:     Shara Hutchins is a 72 y.o. female admitted with a medical diagnosis of <principal problem not specified>.  She presents with the following impairments/functional limitations:  weakness, impaired endurance, impaired self care skills, impaired functional mobility, gait instability, impaired balance, decreased lower extremity function, decreased safety awareness, impaired cardiopulmonary response to activity.    Pt found in bed on RA. No report of dizziness or lightheadedness with mobility.    Rehab Prognosis: Fair; patient would benefit from acute skilled PT services to address these deficits and reach maximum level of function.    Recent Surgery: * No surgery found *      Plan:     During this hospitalization, patient to be seen 6 x/week to address the identified rehab impairments via therapeutic activities, therapeutic exercises, gait training, neuromuscular re-education and progress toward the following goals:    Plan of Care Expires:  10/16/22    Subjective     Chief Complaint: weakness  Patient/Family Comments/goals: go to rehab to get stronger  Pain/Comfort:  Pain Rating 1: 0/10      Objective:     Communicated with RN prior to session.  Patient found HOB elevated with peripheral IV, colostomy, nephrostomy, oxygen, bed alarm upon PT entry to room.     General Precautions: Standard, fall, contact   Orthopedic Precautions:N/A   Braces: N/A  Respiratory Status: Room air     Functional Mobility:  Bed Mobility:     Supine to Sit: contact guard assistance  Sit to Supine: contact guard assistance  Transfers:     Sit to Stand:  contact guard assistance with rolling  walker  Gait: 45 ft with RW and CGA      AM-PAC 6 CLICK MOBILITY          Therapeutic Activities and Exercises:  Pt educated on POC, discharge recommendation, importance of time OOB, pacing/energy conservation, need for assist with mobility, use of call bell to seek assistance as needed and fall prevention      Patient left HOB elevated with all lines intact, call button in reach, and bed alarm on..    GOALS:   Multidisciplinary Problems       Physical Therapy Goals          Problem: Physical Therapy    Goal Priority Disciplines Outcome Goal Variances Interventions   Physical Therapy Goal     PT, PT/OT Ongoing, Progressing     Description: Goals to be met by: 10/16/22     Patient will increase functional independence with mobility by performin. Supine to sit with Supervision  2. Sit to stand transfer with Supervision  3. Bed to chair transfer with Supervision using least restrictive assistive device  4. Gait  x 150 feet with Supervision using  least restrictive assistive device                             Time Tracking:     PT Received On: 22  PT Start Time: 902     PT Stop Time: 914  PT Total Time (min): 12 min     Billable Minutes: Gait Training 12    Treatment Type: Treatment  PT/PTA: PT     PTA Visit Number: 1     2022

## 2022-09-19 NOTE — HOSPITAL COURSE
9/16: Patient visibly weak and short of breath, patient may be a candidate for SNF.  PT/OT recommendations pending.  Home O2 evaluation.  Consult to Case Management .  Check iron, vitamin B12 and folate levels.  No concerns/issues overnight reported by the patient or the nursing staff.     9/17:  Patient had dialysis done yesterday.  Later in the evening nephrostomy tube was noted to be leaking urine, urology consulted and case was discussed.  Awaiting SNF placement     9/18:  Urology reviewed patient and stated that Cases like this can be managed with serial stent exchange Or she may benefit from a simple nephrectomy but no other recommendations at this time.  No acute overnight event    9/19:  Awaiting placement    9/27/2022  Ms Hutchins is waiting for Dr Chavez to remove her abdominal drain and has no other complaints  ROS: see above otherwise negative X 10  PE: in no distress CLAYTON EOM intact moist mucus membranes Neck supple no use of accessory muscles Lungs no crackles wheezes or rhonchi Heart S 1 S 2 RRR no murmur Abdomen BS+ nontender Ext without CC or E pulses 1-2+ Skin abdominal drain right Neuro no acute sensory or motor deficit

## 2022-09-19 NOTE — PLAN OF CARE
Problem: Skin Injury Risk Increased  Goal: Skin Health and Integrity  Intervention: Promote and Optimize Oral Intake  Flowsheets (Taken 9/19/2022 1413)  Oral Nutrition Promotion: calorie-dense liquids provided

## 2022-09-20 NOTE — PT/OT/SLP PROGRESS
Occupational Therapy   Treatment    Name: Shara Hutchins  MRN: [id number]  Admitting Diagnosis:  Fall       Recommendations:     Discharge Recommendations: nursing facility, skilled  Discharge Equipment Recommendations:  none  Barriers to discharge:  None    Assessment:     Shara Hutchins is a 72 y.o. female with a medical diagnosis of Fall.  Pt agreeable to OT therapy session this AM. Performance deficits affecting function are weakness, impaired endurance, impaired self care skills, impaired functional mobility, gait instability, impaired balance, impaired cardiopulmonary response to activity. Pt reports she is feeling better every day. Pt reports she just worked with PT but agreeable to EOB grooming tasks.    Rehab Prognosis:  Good; patient would benefit from acute skilled OT services to address these deficits and reach maximum level of function.       Plan:     Patient to be seen 5 x/week to address the above listed problems via self-care/home management, therapeutic activities, therapeutic exercises  Plan of Care Expires: 10/17/22  Plan of Care Reviewed with: patient    Subjective     Pain/Comfort:  Pain Rating 1: 0/10    Objective:     Communicated with: nursing prior to session.  Patient found HOB elevated with peripheral IV, colostomy, nephrostomy, bed alarm upon OT entry to room.    General Precautions: Standard, fall   Orthopedic Precautions:N/A   Braces: N/A  Respiratory Status: Room air     Occupational Performance:     Bed Mobility:    Patient completed Supine to Sit with stand by assistance and contact guard assistance  Patient completed Sit to Supine with stand by assistance     Activities of Daily Living:  Grooming: stand by assistance seated EOB to brush teeth and to wash face  Lower Body Dressing: stand by assistance seated EOB to don/doff socks with cross leg technique    Treatment & Education:  Pt educated on role of OT/POC, importance of OOB/EOB activity, use of call bell, and safety during ADLs,  transfers, and functional mobility    Patient left HOB elevated with all lines intact, call button in reach, and bed alarm on    GOALS:   Multidisciplinary Problems       Occupational Therapy Goals          Problem: Occupational Therapy    Goal Priority Disciplines Outcome Interventions   Occupational Therapy Goal     OT, PT/OT Ongoing, Progressing    Description: Goals to be met by: 10/17/22     Patient will increase functional independence with ADLs by performing:    UE Dressing with Supervision.  LE Dressing with Supervision.  Grooming while standing at sink with Supervision.  Toileting from toilet with Supervision for hygiene and clothing management.   Toilet transfer to toilet with Supervision.                         Time Tracking:     OT Date of Treatment: 09/20/22  OT Start Time: 1058  OT Stop Time: 1112  OT Total Time (min): 14 min    Billable Minutes:Self Care/Home Management 14    OT/SAMANTHA: OT          9/20/2022

## 2022-09-20 NOTE — PLAN OF CARE
Problem: Adult Inpatient Plan of Care  Goal: Plan of Care Review  Outcome: Ongoing, Progressing  Goal: Patient-Specific Goal (Individualized)  Outcome: Ongoing, Progressing  Goal: Absence of Hospital-Acquired Illness or Injury  Outcome: Ongoing, Progressing  Goal: Optimal Comfort and Wellbeing  Outcome: Ongoing, Progressing  Goal: Readiness for Transition of Care  Outcome: Ongoing, Progressing     Problem: Adjustment to Illness (Sepsis/Septic Shock)  Goal: Optimal Coping  Outcome: Ongoing, Progressing     Problem: Bleeding (Sepsis/Septic Shock)  Goal: Absence of Bleeding  Outcome: Ongoing, Progressing     Problem: Glycemic Control Impaired (Sepsis/Septic Shock)  Goal: Blood Glucose Level Within Desired Range  Outcome: Ongoing, Progressing     Problem: Infection Progression (Sepsis/Septic Shock)  Goal: Absence of Infection Signs and Symptoms  Outcome: Ongoing, Progressing     Problem: Nutrition Impaired (Sepsis/Septic Shock)  Goal: Optimal Nutrition Intake  Outcome: Ongoing, Progressing     Problem: Fluid and Electrolyte Imbalance (Acute Kidney Injury/Impairment)  Goal: Fluid and Electrolyte Balance  Outcome: Ongoing, Progressing     Problem: Oral Intake Inadequate (Acute Kidney Injury/Impairment)  Goal: Optimal Nutrition Intake  Outcome: Ongoing, Progressing     Problem: Renal Function Impairment (Acute Kidney Injury/Impairment)  Goal: Effective Renal Function  Outcome: Ongoing, Progressing     Problem: Fluid Imbalance (Pneumonia)  Goal: Fluid Balance  Outcome: Ongoing, Progressing     Problem: Infection (Pneumonia)  Goal: Resolution of Infection Signs and Symptoms  Outcome: Ongoing, Progressing     Problem: Respiratory Compromise (Pneumonia)  Goal: Effective Oxygenation and Ventilation  Outcome: Ongoing, Progressing     Problem: Impaired Wound Healing  Goal: Optimal Wound Healing  Outcome: Ongoing, Progressing     Problem: Skin Injury Risk Increased  Goal: Skin Health and Integrity  Outcome: Ongoing,  Progressing     Problem: Device-Related Complication Risk (Hemodialysis)  Goal: Safe, Effective Therapy Delivery  Outcome: Ongoing, Progressing     Problem: Hemodynamic Instability (Hemodialysis)  Goal: Effective Tissue Perfusion  Outcome: Ongoing, Progressing     Problem: Infection (Hemodialysis)  Goal: Absence of Infection Signs and Symptoms  Outcome: Ongoing, Progressing     Problem: Fall Injury Risk  Goal: Absence of Fall and Fall-Related Injury  Outcome: Ongoing, Progressing     Problem: Infection  Goal: Absence of Infection Signs and Symptoms  Outcome: Ongoing, Progressing

## 2022-09-20 NOTE — PT/OT/SLP PROGRESS
Physical Therapy Treatment    Patient Name:  Shara Hutchins   MRN:  [id number]    Recommendations:     Discharge Recommendations:  nursing facility, skilled, home health PT (dependent on medical status and progress towards goals)   Discharge Equipment Recommendations: walker, rolling   Barriers to discharge:  increased assist with mobility    Assessment:     Shara Hutchins is a 72 y.o. female admitted with a medical diagnosis of Fall.  She presents with the following impairments/functional limitations:  weakness, impaired endurance, impaired self care skills, impaired functional mobility, gait instability, impaired balance, decreased upper extremity function, decreased lower extremity function, impaired cardiopulmonary response to activity.  Pt agreeable to visit. Pt ambulated 45' with RW and CGA, no reports of dizziness.    Rehab Prognosis: Good; patient would benefit from acute skilled PT services to address these deficits and reach maximum level of function.    Recent Surgery: * No surgery found *      Plan:     During this hospitalization, patient to be seen 6 x/week to address the identified rehab impairments via therapeutic activities, therapeutic exercises, gait training, neuromuscular re-education and progress toward the following goals:    Plan of Care Expires:  10/16/22    Subjective     Chief Complaint: none verbalized  Patient/Family Comments/goals: to get better  Pain/Comfort:  Pain Rating 1: 0/10      Objective:     Communicated with RN prior to session.  Patient found HOB elevated with   upon PT entry to room.     General Precautions: Standard, fall, contact   Orthopedic Precautions:N/A   Braces:    Respiratory Status: Room air     Functional Mobility:  Bed Mobility:     Supine to Sit: contact guard assistance  Sit to Supine: contact guard assistance  Transfers:     Sit to Stand:  contact guard assistance with rolling walker  Gait: x 45' with RW and CGA      AM-PAC 6 CLICK MOBILITY          Therapeutic  Activities and Exercises:   Pt educated on importance of time OOB, importance of intermittent mobility, safe techniques for transfers/ambulation, discharge recommendations/options, and use of call light for assistance and fall prevention.      Patient left HOB elevated with all lines intact, call button in reach, bed alarm on, and RN notified..    GOALS:   Multidisciplinary Problems       Physical Therapy Goals          Problem: Physical Therapy    Goal Priority Disciplines Outcome Goal Variances Interventions   Physical Therapy Goal     PT, PT/OT Ongoing, Progressing     Description: Goals to be met by: 10/16/22     Patient will increase functional independence with mobility by performin. Supine to sit with Supervision  2. Sit to stand transfer with Supervision  3. Bed to chair transfer with Supervision using least restrictive assistive device  4. Gait  x 150 feet with Supervision using  least restrictive assistive device                             Time Tracking:     PT Received On: 22  PT Start Time: 953     PT Stop Time: 1005  PT Total Time (min): 12 min     Billable Minutes: Gait Training 12    Treatment Type: Treatment  PT/PTA: PTA     PTA Visit Number: 1     2022

## 2022-09-20 NOTE — PLAN OF CARE
Problem: Occupational Therapy  Goal: Occupational Therapy Goal  Description: Goals to be met by: 10/17/22     Patient will increase functional independence with ADLs by performing:    UE Dressing with Supervision.  LE Dressing with Supervision.  Grooming while standing at sink with Supervision.  Toileting from toilet with Supervision for hygiene and clothing management.   Toilet transfer to toilet with Supervision.    Outcome: Ongoing, Progressing

## 2022-09-20 NOTE — CARE UPDATE
09/20/22 0812   PRE-TX-O2   O2 Device (Oxygen Therapy) room air   $ Is the patient on Low Flow Oxygen? Yes   SpO2 96 %   Pulse Oximetry Type Intermittent   $ Pulse Oximetry - Multiple Charge Pulse Oximetry - Multiple   Pulse 80   Resp 18   Respiratory Evaluation   $ Care Plan Tech Time 15 min

## 2022-09-20 NOTE — PROGRESS NOTES
INPATIENT NEPHROLOGY Progress Note  [facility]    Patient Name: Shara Hutchins  Date: 09/20/2022    Reason for consultation: ESRD    Chief Complaint:   Chief Complaint   Patient presents with    Fall       History of Present Illness:  72-year-old ESRD on HD MWF and history of multiple abdominal surgeries for ischemic colitis who was recently discharged with ischemic colitis s/p surgery on 9/8, readmitted with gen weakness, abd pain and fall, no exac/reliev factors, consulted for dialysis.     Interval History:  9/16- I think she is dry- hold BP meds- UF even  9/17- awaiting SNF  9/18- BP remains low, pain meds helping, leakage around NT noted along with urology note, feels weak  9/19  overall feels better.  The drain is bothering her.  No sob or cp.  Has intermittent nausea  9/20 doing alright today.  No chest pain.  Eating a little better.  It doesn't help that they bring her the same meal every time.      Plan of Care:    Assessment:  ESRD on HD MWF  Hypotension/Baseline HTN  Hypokalemia  SHPT  Anemia of CKD    Plan:    - continue HD MWF  - hold home BP meds- gentle UF- okay with fluid challenge if SBP drops to < 90 and symptomatic/orthostatic- be judicious with pain meds  - adjust dialysate  - continue phos binder (home supply)  - increased epogen to 10k with dialysis  --trial of low dose midodrine for hypotension    Thank you for allowing us to participate in this patient's care. We will continue to follow.    Vital Signs:  Temp Readings from Last 3 Encounters:   09/20/22 97.7 °F (36.5 °C) (Axillary)   09/14/22 98 °F (36.7 °C) (Oral)   08/30/22 98.8 °F (37.1 °C) (Skin)       Pulse Readings from Last 3 Encounters:   09/20/22 76   09/14/22 79   08/30/22 (P) 71       BP Readings from Last 3 Encounters:   09/20/22 (!) 109/57   09/14/22 112/68   08/30/22 (!) (P) 125/58       Weight:  Wt Readings from Last 3 Encounters:   09/15/22 70.8 kg (156 lb)   09/08/22 70.9 kg (156 lb 3.2 oz)   08/30/22 69.4 kg  (153 lb)     Medications:  Scheduled Meds:   [START ON 9/21/2022] epoetin nica-ebpx (RETACRIT) injection  10,000 Units Intravenous Every Mon, Wed, Fri    levothyroxine  88 mcg Oral Before breakfast    midodrine  2.5 mg Oral TID     Continuous Infusions:  PRN Meds:.acetaminophen, acetaminophen, aluminum-magnesium hydroxide-simethicone, dextrose 10%, dextrose 10%, glucagon (human recombinant), glucose, glucose, HYDROcodone-acetaminophen, hydrOXYzine HCL, LIDOcaine-prilocaine, melatonin, morphine, naloxone, ondansetron, prochlorperazine, simethicone, sodium chloride 0.9%, sodium chloride 0.9%  Current Facility-Administered Medications on File Prior to Encounter   Medication Dose Route Frequency Provider Last Rate Last Admin    electrolyte-S (ISOLYTE)   Intravenous Continuous Raz Ruff MD        lactated ringers infusion   Intravenous Continuous Ruy Beltre MD         Current Outpatient Medications on File Prior to Encounter   Medication Sig Dispense Refill    cholecalciferol, vitamin D3, (VITAMIN D3) 25 mcg (1,000 unit) capsule Take 1 capsule (1,000 Units total) by mouth once daily. 10 capsule 0    diltiaZEM (CARDIZEM CD) 120 MG Cp24 Take 1 capsule (120 mg total) by mouth once daily. 90 capsule 3    ferric citrate (AURYXIA) 210 mg iron Tab Take 2 tablets by mouth 3 (three) times daily with meals.      gabapentin (NEURONTIN) 100 MG capsule Take 100 mg by mouth 2 (two) times daily. HCS      levothyroxine (SYNTHROID) 88 MCG tablet TAKE 1 TABLET BEFORE BREAKFAST (Patient taking differently: Take 88 mcg by mouth before breakfast. TAKE 1 TABLET BEFORE BREAKFAST) 90 tablet 3    LORazepam (ATIVAN) 0.5 MG tablet Take 1 tablet (0.5 mg total) by mouth every evening. 90 tablet 1    oxyCODONE (ROXICODONE) 5 MG immediate release tablet Take 1 tablet (5 mg total) by mouth every 4 (four) hours as needed for Pain. 20 tablet 0    promethazine (PHENERGAN) 25 MG tablet TAKE 1 TABLET (25 MG TOTAL) BY MOUTH 2 (TWO) TIMES DAILY  AS NEEDED FOR NAUSEA. 60 tablet 0    rOPINIRole (REQUIP) 0.5 MG tablet Take 0.5 mg by mouth every 8 (eight) hours.      sertraline (ZOLOFT) 50 MG tablet Take 1 tablet (50 mg total) by mouth every evening. Take 50 mg by mouth every evening. 90 tablet 1    traMADoL (ULTRAM) 50 mg tablet Take 1 tablet (50 mg total) by mouth every 6 (six) hours as needed. 30 tablet 0    zinc gluconate 50 mg tablet Take 50 mg by mouth once daily.      hydrALAZINE (APRESOLINE) 50 MG tablet Take 1 tablet (50 mg total) by mouth every 8 (eight) hours. (Patient not taking: Reported on 9/15/2022) 270 tablet 0    [DISCONTINUED] miconazole (MICOTIN) 2 % cream Apply topically 2 (two) times daily. 28 g 0     Review of Systems:  Neg    Physical Exam:  Constitutional: nad, aao x 3  Heart: rrr, no m/r/g, wwp, no edema  Lungs: ctab, no w/r/r/c, no lb  Abdomen: s/nt/nd, +BS, ostomy    Results:  Lab Results   Component Value Date     09/20/2022    K 4.2 09/20/2022     09/20/2022    CO2 25 09/20/2022    BUN 21 09/20/2022    CREATININE 4.6 (H) 09/20/2022    CALCIUM 8.0 (L) 09/20/2022    ANIONGAP 2 (L) 09/20/2022    ESTGFRAFRICA 12.5 (A) 07/20/2022    EGFRNONAA 10.9 (A) 07/20/2022       Lab Results   Component Value Date    CALCIUM 8.0 (L) 09/20/2022    PHOS 5.4 (H) 09/07/2022       Recent Labs   Lab 09/20/22  0528   WBC 11.14   RBC 2.51*   HGB 8.3*   HCT 27.6*      *   MCH 33.1*   MCHC 30.1*         I have personally reviewed pertinent radiological imaging and reports.    I have spent > 35 minutes providing care for this patient for the above diagnoses. These services have included chart/data/imaging review, evaluation, exam, formulation of plan, , note preparation, and discussions with staff involved in this patient's care.    Raúl Dietz MD  Nephrology  [facility] [place]  [phone] n

## 2022-09-20 NOTE — TELEPHONE ENCOUNTER
Received request for prior authorization for Promethazine   However patient was able to fill prescription   on 08/22/22

## 2022-09-21 NOTE — PROGRESS NOTES
[facility] Medicine  Progress Note    Patient name: Shara Hutchins  MRN: [id number]  Admit Date: 9/15/2022   LOS: 4 days     SUBJECTIVE:     Principal problem: Fall    Interval History:  No issues overnight.  No complaints today.  EAting and drinking okay.  Participating with therapy. Anxious to get to SNF and asking for updates.     Hospital Course:    Patient had recent laparotomy for an acute abdomen with air under the diaphragm- ischemic sigmoid with sigmoid stricture seen.  S/p resection with ostomy created 9/7.  She went home to her assisted living facility, fell and then stayed on the floor all night as no one was aware she had fallen and she could not get up.  She had hypotension and BP meds were held.  Midodrine started. This has stabilized.  Hypotension is hypothesized to be the etiology of her fall. Nephrology consulted for routine HD and is following.  She has a nephrostomy in place- had at an outside facility.  I'm not sure why it was placed. She has hx of ureteral stricture and recurrent UTIs that had been being treated by recurrent stents.  I reached out to her Urologist, Dr. Barber, and will plan to continue with nephrostomy tube for now with plan to follow up with him in office to re evaluate long term plan for this.  Seen by Dr. Campos and no present Urology needs while hospitalized.  Imagnig shows tube is in good condition.  She has some leakage of urine around it at times. Undergoing routine ostomy care. Seen by PT/OT and CM consulted for SNF.  Awaiting placement.  She is medically clear for discharge to SNF.     Scheduled Meds:   [START ON 9/21/2022] epoetin nica-ebpx (RETACRIT) injection  10,000 Units Intravenous Every Mon, Wed, Fri    levothyroxine  88 mcg Oral Before breakfast    midodrine  2.5 mg Oral TID     Continuous Infusions:  PRN Meds:acetaminophen, acetaminophen, aluminum-magnesium hydroxide-simethicone, dextrose 10%, dextrose 10%, glucagon (human recombinant),  glucose, glucose, HYDROcodone-acetaminophen, hydrOXYzine HCL, LIDOcaine-prilocaine, melatonin, morphine, naloxone, ondansetron, prochlorperazine, simethicone, sodium chloride 0.9%, sodium chloride 0.9%    Review of patient's allergies indicates:   Allergen Reactions    Ciprofloxacin Other (See Comments)     Elevated liver enzymes      Pcn [penicillins] Anaphylaxis     TOLERATES ROCEPHIN WITHOUT DIFFICULTY       Review of Systems: As per interval history    OBJECTIVE:     Vital Signs (Most Recent)  Temp: 97.8 °F (36.6 °C) (09/20/22 1725)  Pulse: 74 (09/20/22 1725)  Resp: 18 (09/20/22 1725)  BP: 100/67 (09/20/22 1725)  SpO2: 96 % (09/20/22 1725)    Vital Signs Range (Last 24H):  Temp:  [97.7 °F (36.5 °C)-98.8 °F (37.1 °C)]   Pulse:  [74-84]   Resp:  [16-20]   BP: (100-138)/(54-72)   SpO2:  [93 %-98 %]     I & O (Last 24H):  Intake/Output Summary (Last 24 hours) at 9/20/2022 1920  Last data filed at 9/20/2022 0200  Gross per 24 hour   Intake --   Output 1100 ml   Net -1100 ml       Physical Exam:    Vitals and nursing note reviewed.     Constitutional:       General: Not in acute distress.     Appearance: Well-developed.   HENT:      Head: Normocephalic and atraumatic.   Eyes:      Pupils: Pupils are equal, round, and reactive to light.   Cardiovascular:      Rate and Rhythm: Regular rhythm.   Pulmonary:      Effort: Pulmonary effort is normal.      Breath sounds: Normal breath sounds. No wheezing.   Abdominal:      General: There is no distension.      Palpations: Abdomen is soft.      Tenderness: There is no abdominal tenderness. There is no guarding or rebound.   Ostomy  Musculoskeletal:         General: Normal range of motion.      Cervical back: Normal range of motion.   Skin:     Findings: No rash.   Neurological:      Mental Status: Alert and oriented to person, place, and time.      Cranial Nerves: No cranial nerve deficit.      Sensory: No sensory deficit.   Nephrostomy tube    Laboratory:  I have reviewed all  pertinent lab results within the past 24 hours.  CBC:   Recent Labs   Lab 09/20/22  0528   WBC 11.14   RBC 2.51*   HGB 8.3*   HCT 27.6*      *   MCH 33.1*   MCHC 30.1*     CMP:   Recent Labs   Lab 09/15/22  1105 09/16/22  0741 09/20/22  0527   GLU 81   < > 80   CALCIUM 8.9   < > 8.0*   ALBUMIN 2.2*  --   --    PROT 6.2  --   --       < > 137   K 3.5   < > 4.2   CO2 23   < > 25      < > 110   BUN 26*   < > 21   CREATININE 4.7*   < > 4.6*   ALKPHOS 192*  --   --    ALT 14  --   --    AST 23  --   --    BILITOT 0.8  --   --     < > = values in this interval not displayed.     Microbiology Results (last 7 days)       ** No results found for the last 168 hours. **            Diagnostic Results:      ASSESSMENT/PLAN:         Active Hospital Problems    Diagnosis  POA    *Fall [W19.XXXA]  Yes    ESRD on dialysis MWF [N18.6, Z99.2]  Not Applicable     Chronic    Hypothyroidism [E03.9]  Yes     Chronic      Resolved Hospital Problems   No resolved problems to display.         Plan:       -Fall suspected to be related to hypotension. Anti hypertensives held. Midodrine started. Stable.  -Nephrology following for routine HD  -PT/OT  -CM consulted for SNF. Awaiting accepting facility.      VTE Risk Mitigation (From admission, onward)      None              Department Hospital Medicine  [facility]  Brittaney Hooker MD  Date of service: 09/20/2022

## 2022-09-21 NOTE — PT/OT/SLP PROGRESS
Occupational Therapy   Treatment    Name: Shara Hutchins  MRN: [id number]  Admitting Diagnosis:  Fall       Recommendations:     Discharge Recommendations: nursing facility, skilled  Discharge Equipment Recommendations:  none  Barriers to discharge:  None    Assessment:     Shara Hutchins is a 72 y.o. female with a medical diagnosis of Fall.  Performance deficits affecting function are weakness, impaired endurance, impaired self care skills, impaired functional mobility, gait instability, impaired balance, decreased upper extremity function, decreased lower extremity function, impaired cardiopulmonary response to activity.     Rehab Prognosis:  Good; patient would benefit from acute skilled OT services to address these deficits and reach maximum level of function.       Plan:     Patient to be seen 3 x/week to address the above listed problems via self-care/home management, therapeutic activities, therapeutic exercises  Plan of Care Expires: 10/17/22  Plan of Care Reviewed with: patient    Subjective     Pain/Comfort:  Pain Rating 1: 0/10  Pain Rating Post-Intervention 1: 0/10    Objective:     Communicated with: nurse prior to session.  Patient found supine with peripheral IV, colostomy, bed alarm upon OT entry to room.    General Precautions: Standard, fall   Orthopedic Precautions:N/A   Braces: N/A  Respiratory Status: Room air     Occupational Performance:     Bed Mobility:    Patient completed Scooting/Bridging with stand by assistance  Patient completed Supine to Sit with stand by assistance  Patient completed Sit to Supine with stand by assistance     Functional Mobility/Transfers:  Patient completed Sit <> Stand Transfer with stand by assistance  with  rolling walker   Functional Mobility: ambulated 28 feet with walker, no LOB, SBA    Activities of Daily Living:  Grooming: supervision while sitting EOB  Lower Body Dressing: stand by assistance donning socks in bed.      [facility] 6 Click ADL:      Treatment &  Education:  Role of OT, safety awareness, importance of OOB activity, call bell use.  Pt needing assistance to open package while sitting in bed.     Patient left HOB elevated with all lines intact, call button in reach, and bed alarm on    GOALS:   Multidisciplinary Problems       Occupational Therapy Goals          Problem: Occupational Therapy    Goal Priority Disciplines Outcome Interventions   Occupational Therapy Goal     OT, PT/OT Ongoing, Progressing    Description: Goals to be met by: 10/17/22     Patient will increase functional independence with ADLs by performing:    UE Dressing with Supervision.  LE Dressing with Supervision.  Grooming while standing at sink with Supervision.  Toileting from toilet with Supervision for hygiene and clothing management.   Toilet transfer to toilet with Supervision.                         Time Tracking:     OT Date of Treatment: 09/21/22  OT Start Time: 0930  OT Stop Time: 0953  OT Total Time (min): 23 min    Billable Minutes:Self Care/Home Management 13  Therapeutic Activity 10    OT/SAMANTHA: OT          9/21/2022

## 2022-09-21 NOTE — PLAN OF CARE
09/21/22 1156   Patient Assessment/Suction   Level of Consciousness (AVPU) alert   Respiratory Effort Normal;Unlabored   All Lung Fields Breath Sounds clear   PRE-TX-O2   O2 Device (Oxygen Therapy) room air   SpO2 97 %   Pulse Oximetry Type Intermittent   $ Pulse Oximetry - Multiple Charge Pulse Oximetry - Multiple   Pulse 70   Resp 18   Education   $ Education 15 min   Respiratory Evaluation   $ Care Plan Tech Time 15 min

## 2022-09-21 NOTE — PROGRESS NOTES
INPATIENT NEPHROLOGY Progress Note  [facility]    Patient Name: Shara Hutchins  Date: 09/21/2022    Reason for consultation: ESRD    Chief Complaint:   Chief Complaint   Patient presents with    Fall       History of Present Illness:  72-year-old ESRD on HD MWF and history of multiple abdominal surgeries for ischemic colitis who was recently discharged with ischemic colitis s/p surgery on 9/8, readmitted with gen weakness, abd pain and fall, no exac/reliev factors, consulted for dialysis.     Interval History:  9/16- I think she is dry- hold BP meds- UF even  9/17- awaiting SNF  9/18- BP remains low, pain meds helping, leakage around NT noted along with urology note, feels weak  9/19  overall feels better.  The drain is bothering her.  No sob or cp.  Has intermittent nausea  9/20 doing alright today.  No chest pain.  Eating a little better.  It doesn't help that they bring her the same meal every time.    9/21  feels good today.  No chest pain or sob.  In good spirits     Plan of Care:    Assessment:  ESRD on HD MWF  Hypotension/Baseline HTN  Hypokalemia  SHPT  Anemia of CKD    Plan:    - continue HD MWF  - hold home BP meds- gentle UF- okay with fluid challenge if SBP drops to < 90 and symptomatic/orthostatic- be judicious with pain meds  - adjust dialysate  - continue phos binder (home supply)  - increased epogen to 10k with dialysis  --trial of low dose midodrine for hypotension    Thank you for allowing us to participate in this patient's care. We will continue to follow.    Vital Signs:  Temp Readings from Last 3 Encounters:   09/21/22 98.1 °F (36.7 °C) (Oral)   09/14/22 98 °F (36.7 °C) (Oral)   08/30/22 98.8 °F (37.1 °C) (Skin)       Pulse Readings from Last 3 Encounters:   09/21/22 70   09/14/22 79   08/30/22 (P) 71       BP Readings from Last 3 Encounters:   09/21/22 119/65   09/14/22 112/68   08/30/22 (!) (P) 125/58       Weight:  Wt Readings from Last 3 Encounters:   09/15/22 70.8 kg (156  lb)   09/08/22 70.9 kg (156 lb 3.2 oz)   08/30/22 69.4 kg (153 lb)     Medications:  Scheduled Meds:   epoetin nica-ebpx (RETACRIT) injection  10,000 Units Intravenous Every Mon, Wed, Fri    levothyroxine  88 mcg Oral Before breakfast    midodrine  2.5 mg Oral TID     Continuous Infusions:  PRN Meds:.acetaminophen, acetaminophen, aluminum-magnesium hydroxide-simethicone, dextrose 10%, dextrose 10%, glucagon (human recombinant), glucose, glucose, HYDROcodone-acetaminophen, hydrOXYzine HCL, LIDOcaine-prilocaine, melatonin, morphine, naloxone, ondansetron, prochlorperazine, simethicone, sodium chloride 0.9%, sodium chloride 0.9%  Current Facility-Administered Medications on File Prior to Encounter   Medication Dose Route Frequency Provider Last Rate Last Admin    electrolyte-S (ISOLYTE)   Intravenous Continuous Raz Ruff MD        lactated ringers infusion   Intravenous Continuous Ruy Beltre MD         Current Outpatient Medications on File Prior to Encounter   Medication Sig Dispense Refill    cholecalciferol, vitamin D3, (VITAMIN D3) 25 mcg (1,000 unit) capsule Take 1 capsule (1,000 Units total) by mouth once daily. 10 capsule 0    diltiaZEM (CARDIZEM CD) 120 MG Cp24 Take 1 capsule (120 mg total) by mouth once daily. 90 capsule 3    ferric citrate (AURYXIA) 210 mg iron Tab Take 2 tablets by mouth 3 (three) times daily with meals.      gabapentin (NEURONTIN) 100 MG capsule Take 100 mg by mouth 2 (two) times daily. HCS      levothyroxine (SYNTHROID) 88 MCG tablet TAKE 1 TABLET BEFORE BREAKFAST (Patient taking differently: Take 88 mcg by mouth before breakfast. TAKE 1 TABLET BEFORE BREAKFAST) 90 tablet 3    LORazepam (ATIVAN) 0.5 MG tablet Take 1 tablet (0.5 mg total) by mouth every evening. 90 tablet 1    oxyCODONE (ROXICODONE) 5 MG immediate release tablet Take 1 tablet (5 mg total) by mouth every 4 (four) hours as needed for Pain. 20 tablet 0    promethazine (PHENERGAN) 25 MG tablet TAKE 1 TABLET (25 MG  TOTAL) BY MOUTH 2 (TWO) TIMES DAILY AS NEEDED FOR NAUSEA. 60 tablet 0    rOPINIRole (REQUIP) 0.5 MG tablet Take 0.5 mg by mouth every 8 (eight) hours.      sertraline (ZOLOFT) 50 MG tablet Take 1 tablet (50 mg total) by mouth every evening. Take 50 mg by mouth every evening. 90 tablet 1    traMADoL (ULTRAM) 50 mg tablet Take 1 tablet (50 mg total) by mouth every 6 (six) hours as needed. 30 tablet 0    zinc gluconate 50 mg tablet Take 50 mg by mouth once daily.      hydrALAZINE (APRESOLINE) 50 MG tablet Take 1 tablet (50 mg total) by mouth every 8 (eight) hours. (Patient not taking: Reported on 9/15/2022) 270 tablet 0    [DISCONTINUED] miconazole (MICOTIN) 2 % cream Apply topically 2 (two) times daily. 28 g 0     Review of Systems:  Neg    Physical Exam:  Constitutional: nad, aao x 3  Heart: rrr, no m/r/g, wwp, no edema  Lungs: ctab, no w/r/r/c, no lb  Abdomen: s/nt/nd, +BS, ostomy    Results:  Lab Results   Component Value Date     09/20/2022    K 4.2 09/20/2022     09/20/2022    CO2 25 09/20/2022    BUN 21 09/20/2022    CREATININE 4.6 (H) 09/20/2022    CALCIUM 8.0 (L) 09/20/2022    ANIONGAP 2 (L) 09/20/2022    ESTGFRAFRICA 12.5 (A) 07/20/2022    EGFRNONAA 10.9 (A) 07/20/2022       Lab Results   Component Value Date    CALCIUM 8.0 (L) 09/20/2022    PHOS 5.4 (H) 09/07/2022       No results for input(s): WBC, RBC, HGB, HCT, PLT, MCV, MCH, MCHC in the last 24 hours.      I have personally reviewed pertinent radiological imaging and reports.    I have spent > 35 minutes providing care for this patient for the above diagnoses. These services have included chart/data/imaging review, evaluation, exam, formulation of plan, , note preparation, and discussions with staff involved in this patient's care.    Raúl Dietz MD  Nephrology  [facility] [place]  [phone] n

## 2022-09-21 NOTE — PROGRESS NOTES
[facility] Medicine  Progress Note    Patient name: Shara Hutchins  MRN: [id number]  Admit Date: 9/15/2022   LOS: 5 days     SUBJECTIVE:     Principal problem: Fall    Interval History:  No issues overnight.  I have stopped checking labs as no acute issues and now awaiting SNF placement.  Awaiting an accepting facility.  She tells me [facility] will [facility] once we have an accepting facility.    Hospital Course:    Patient had recent laparotomy for an acute abdomen with air under the diaphragm- ischemic sigmoid with sigmoid stricture seen.  S/p resection with ostomy created 9/7.  She went home to her assisted living facility, fell and then stayed on the floor all night as no one was aware she had fallen and she could not get up.  She had hypotension and BP meds were held.  Midodrine started. This has stabilized.  Hypotension is hypothesized to be the etiology of her fall. Nephrology consulted for routine HD and is following.  She has a nephrostomy in place- had at an outside facility.  I'm not sure why it was placed. She has hx of ureteral stricture and recurrent UTIs that had been being treated by recurrent stents.  I reached out to her Urologist, Dr. Barber, and will plan to continue with nephrostomy tube for now with plan to follow up with him in office to re evaluate long term plan for this.  Seen by Dr. Campos and no present Urology needs while hospitalized.  Imagnig shows tube is in good condition.  She has some leakage of urine around it at times. Undergoing routine ostomy care. Seen by PT/OT and CM consulted for SNF.  Awaiting placement.  She is medically clear for discharge to SNF.     Scheduled Meds:   epoetin nica-ebpx (RETACRIT) injection  10,000 Units Intravenous Every Mon, Wed, Fri    levothyroxine  88 mcg Oral Before breakfast    midodrine  2.5 mg Oral TID     Continuous Infusions:  PRN Meds:acetaminophen, acetaminophen,  aluminum-magnesium hydroxide-simethicone, dextrose 10%, dextrose 10%, glucagon (human recombinant), glucose, glucose, HYDROcodone-acetaminophen, hydrOXYzine HCL, LIDOcaine-prilocaine, melatonin, morphine, naloxone, ondansetron, prochlorperazine, simethicone, sodium chloride 0.9%, sodium chloride 0.9%    Review of patient's allergies indicates:   Allergen Reactions    Ciprofloxacin Other (See Comments)     Elevated liver enzymes      Pcn [penicillins] Anaphylaxis     TOLERATES ROCEPHIN WITHOUT DIFFICULTY       Review of Systems: As per interval history    OBJECTIVE:     Vital Signs (Most Recent)  Temp: 98.1 °F (36.7 °C) (09/21/22 1152)  Pulse: 70 (09/21/22 1156)  Resp: 18 (09/21/22 1337)  BP: 119/65 (09/21/22 1152)  SpO2: 97 % (09/21/22 1156)    Vital Signs Range (Last 24H):  Temp:  [97.4 °F (36.3 °C)-98.9 °F (37.2 °C)]   Pulse:  [67-74]   Resp:  [17-18]   BP: (100-126)/(61-73)   SpO2:  [96 %-98 %]     I & O (Last 24H):  Intake/Output Summary (Last 24 hours) at 9/21/2022 1708  Last data filed at 9/21/2022 1042  Gross per 24 hour   Intake --   Output 700 ml   Net -700 ml         Physical Exam:    Vitals and nursing note reviewed.     Constitutional:       General: Not in acute distress.     Appearance: Well-developed.   HENT:      Head: Normocephalic and atraumatic.   Eyes:      Pupils: Pupils are equal, round, and reactive to light.   Cardiovascular:      Rate and Rhythm: Regular rhythm.   Pulmonary:      Effort: Pulmonary effort is normal.      Breath sounds: Normal breath sounds. No wheezing.   Abdominal:      General: There is no distension.      Palpations: Abdomen is soft.      Tenderness: There is no abdominal tenderness. There is no guarding or rebound.   Ostomy  Musculoskeletal:         General: Normal range of motion.      Cervical back: Normal range of motion.   Skin:     Findings: No rash.   Neurological:      Mental Status: Alert and oriented to person, place, and time.      Cranial Nerves: No cranial  nerve deficit.      Sensory: No sensory deficit.   Nephrostomy tube    Laboratory:  I have reviewed all pertinent lab results within the past 24 hours.  CBC:   Recent Labs   Lab 09/20/22  0528   WBC 11.14   RBC 2.51*   HGB 8.3*   HCT 27.6*      *   MCH 33.1*   MCHC 30.1*       CMP:   Recent Labs   Lab 09/15/22  1105 09/16/22  0741 09/20/22  0527   GLU 81   < > 80   CALCIUM 8.9   < > 8.0*   ALBUMIN 2.2*  --   --    PROT 6.2  --   --       < > 137   K 3.5   < > 4.2   CO2 23   < > 25      < > 110   BUN 26*   < > 21   CREATININE 4.7*   < > 4.6*   ALKPHOS 192*  --   --    ALT 14  --   --    AST 23  --   --    BILITOT 0.8  --   --     < > = values in this interval not displayed.       Microbiology Results (last 7 days)       ** No results found for the last 168 hours. **            Diagnostic Results:      ASSESSMENT/PLAN:         Active Hospital Problems    Diagnosis  POA    *Fall [W19.XXXA]  Yes    ESRD on dialysis MWF [N18.6, Z99.2]  Not Applicable     Chronic    Hypothyroidism [E03.9]  Yes     Chronic      Resolved Hospital Problems   No resolved problems to display.         Plan:       -Fall suspected to be related to hypotension. Anti hypertensives held. Midodrine started. Stable.  -Nephrology following for routine HD  -PT/OT  -CM consulted for SNF. Awaiting accepting facility.  [facility] and passed along the [facility] information to staff.  I'm not sure what significance that is but will explore further.      VTE Risk Mitigation (From admission, onward)      None              Department Hospital Medicine  [facility]  Brittaney Hooker MD  Date of service: 09/21/2022

## 2022-09-21 NOTE — PT/OT/SLP PROGRESS
Physical Therapy      Patient Name:  Shara Hutchins   MRN:  [id number]    Patient not seen today secondary to Patient unwilling to participate, Other (Comment) (reports in am that she ambualted with OT and is tired. In pm, pt reports just back to bed following toileting independently. Unable to motivate to particpate. Waiting for HD and anticipates DC to SNF.). Will follow-up tomorrow.

## 2022-09-22 NOTE — PROGRESS NOTES
INPATIENT NEPHROLOGY Progress Note  [facility]    Patient Name: Shara Hutchins  Date: 09/22/2022    Reason for consultation: ESRD    Chief Complaint:   Chief Complaint   Patient presents with    Fall       History of Present Illness:  72-year-old ESRD on HD MWF and history of multiple abdominal surgeries for ischemic colitis who was recently discharged with ischemic colitis s/p surgery on 9/8, readmitted with gen weakness, abd pain and fall, no exac/reliev factors, consulted for dialysis.     Interval History:  9/16- I think she is dry- hold BP meds- UF even  9/17- awaiting SNF  9/18- BP remains low, pain meds helping, leakage around NT noted along with urology note, feels weak  9/19  overall feels better.  The drain is bothering her.  No sob or cp.  Has intermittent nausea  9/20 doing alright today.  No chest pain.  Eating a little better.  It doesn't help that they bring her the same meal every time.    9/21  feels good today.  No chest pain or sob.  In good spirits   9/22 nauseated this am.  No chest pain or sob.  Upset that they keep bringing her meals that are ice cold.  I can't say that I blame her.  That's gross    Plan of Care:    Assessment:  ESRD on HD MWF  Hypotension/Baseline HTN  Hypokalemia  SHPT  Anemia of CKD    Plan:    - continue HD MWF  - hold home BP meds- gentle UF- okay with fluid challenge if SBP drops to < 90 and symptomatic/orthostatic- be judicious with pain meds  - adjust dialysate  - continue phos binder (home supply)  - increased epogen to 10k with dialysis  --trial of low dose midodrine for hypotension  --prn zofran ok for ESRD patients  --I told her if they keep bringing her inedible food she should contact patient advocacy    Thank you for allowing us to participate in this patient's care. We will continue to follow.    Vital Signs:  Temp Readings from Last 3 Encounters:   09/22/22 98.4 °F (36.9 °C) (Oral)   09/14/22 98 °F (36.7 °C) (Oral)   08/30/22 98.8 °F (37.1  °C) (Skin)       Pulse Readings from Last 3 Encounters:   09/22/22 68   09/14/22 79   08/30/22 (P) 71       BP Readings from Last 3 Encounters:   09/22/22 111/65   09/14/22 112/68   08/30/22 (!) (P) 125/58       Weight:  Wt Readings from Last 3 Encounters:   09/15/22 70.8 kg (156 lb)   09/08/22 70.9 kg (156 lb 3.2 oz)   08/30/22 69.4 kg (153 lb)     Medications:  Scheduled Meds:   epoetin nica-ebpx (RETACRIT) injection  10,000 Units Intravenous Every Mon, Wed, Fri    levothyroxine  88 mcg Oral Before breakfast    midodrine  2.5 mg Oral TID     Continuous Infusions:  PRN Meds:.acetaminophen, acetaminophen, aluminum-magnesium hydroxide-simethicone, dextrose 10%, dextrose 10%, glucagon (human recombinant), glucose, glucose, HYDROcodone-acetaminophen, hydrOXYzine HCL, LIDOcaine-prilocaine, melatonin, morphine, naloxone, ondansetron, prochlorperazine, simethicone, sodium chloride 0.9%, sodium chloride 0.9%  Current Facility-Administered Medications on File Prior to Encounter   Medication Dose Route Frequency Provider Last Rate Last Admin    electrolyte-S (ISOLYTE)   Intravenous Continuous Raz Ruff MD        lactated ringers infusion   Intravenous Continuous Ruy Beltre MD         Current Outpatient Medications on File Prior to Encounter   Medication Sig Dispense Refill    cholecalciferol, vitamin D3, (VITAMIN D3) 25 mcg (1,000 unit) capsule Take 1 capsule (1,000 Units total) by mouth once daily. 10 capsule 0    diltiaZEM (CARDIZEM CD) 120 MG Cp24 Take 1 capsule (120 mg total) by mouth once daily. 90 capsule 3    ferric citrate (AURYXIA) 210 mg iron Tab Take 2 tablets by mouth 3 (three) times daily with meals.      gabapentin (NEURONTIN) 100 MG capsule Take 100 mg by mouth 2 (two) times daily. HCS      levothyroxine (SYNTHROID) 88 MCG tablet TAKE 1 TABLET BEFORE BREAKFAST (Patient taking differently: Take 88 mcg by mouth before breakfast. TAKE 1 TABLET BEFORE BREAKFAST) 90 tablet 3    LORazepam (ATIVAN) 0.5  MG tablet Take 1 tablet (0.5 mg total) by mouth every evening. 90 tablet 1    oxyCODONE (ROXICODONE) 5 MG immediate release tablet Take 1 tablet (5 mg total) by mouth every 4 (four) hours as needed for Pain. 20 tablet 0    promethazine (PHENERGAN) 25 MG tablet TAKE 1 TABLET (25 MG TOTAL) BY MOUTH 2 (TWO) TIMES DAILY AS NEEDED FOR NAUSEA. 60 tablet 0    rOPINIRole (REQUIP) 0.5 MG tablet Take 0.5 mg by mouth every 8 (eight) hours.      sertraline (ZOLOFT) 50 MG tablet Take 1 tablet (50 mg total) by mouth every evening. Take 50 mg by mouth every evening. 90 tablet 1    traMADoL (ULTRAM) 50 mg tablet Take 1 tablet (50 mg total) by mouth every 6 (six) hours as needed. 30 tablet 0    zinc gluconate 50 mg tablet Take 50 mg by mouth once daily.      hydrALAZINE (APRESOLINE) 50 MG tablet Take 1 tablet (50 mg total) by mouth every 8 (eight) hours. (Patient not taking: Reported on 9/15/2022) 270 tablet 0    [DISCONTINUED] miconazole (MICOTIN) 2 % cream Apply topically 2 (two) times daily. 28 g 0     Review of Systems:  Neg    Physical Exam:  Constitutional: nad, aao x 3  Heart: rrr, no m/r/g, wwp, no edema  Lungs: ctab, no w/r/r/c, no lb  Abdomen: s/nt/nd, +BS, ostomy    Results:  Lab Results   Component Value Date     09/22/2022    K 4.3 09/22/2022     09/22/2022    CO2 29 09/22/2022    BUN 16 09/22/2022    CREATININE 3.9 (H) 09/22/2022    CALCIUM 8.3 (L) 09/22/2022    ANIONGAP 8 09/22/2022    ESTGFRAFRICA 12.5 (A) 07/20/2022    EGFRNONAA 10.9 (A) 07/20/2022       Lab Results   Component Value Date    CALCIUM 8.3 (L) 09/22/2022    PHOS 3.2 09/22/2022       Recent Labs   Lab 09/22/22  0914   WBC 8.85   RBC 2.58*   HGB 8.7*   HCT 28.3*      *   MCH 33.7*   MCHC 30.7*         I have personally reviewed pertinent radiological imaging and reports.    I have spent > 35 minutes providing care for this patient for the above diagnoses. These services have included chart/data/imaging review, evaluation, exam,  formulation of plan, , note preparation, and discussions with staff involved in this patient's care.    Raúl Dietz MD  Nephrology  [facility] [place]  [phone] n

## 2022-09-22 NOTE — PROGRESS NOTES
09/1950   Vital Signs   Temp 98.2 °F (36.8 °C)   Temp src Oral   Pulse 74   Heart Rate Source Monitor   Resp 20   SpO2 96 %   O2 Device (Oxygen Therapy) room air   /69   BP Location Right arm   BP Method Automatic   Patient Position Lying        Hemodialysis AV Fistula Left upper arm   No Placement Date or Time found.   Present Prior to Hospital Arrival?: Yes  Location: Left upper arm   Site Assessment Intact;Dry;Clean;No redness;No swelling   Patency Present;Thrill;Bruit   Status Deaccessed   Flows Good   Dressing Status Clean;Dry;Intact   Site Condition No complications   Dressing Gauze  (Paper Tape)   Post-Hemodialysis Assessment   Rinseback Volume (mL) 250 mL   Blood Volume Processed (Liters) 49.6 L   Dialyzer Clearance Lightly streaked   Duration of Treatment 180 minutes   Additional Fluid Intake (mL) 600 mL   Total UF (mL) 600 mL   Net Fluid Removal 0   Patient Response to Treatment SBP dropped below parameters to keep greater than 110. Orders received from Dr Dietz to keep pt even.   Post-Treatment Weight 69.1 kg (152 lb 5.4 oz)   Treatment Weight Change -0.9   Arterial bleeding stop time (min) 5 min   Venous bleeding stop time (min) 5 min   Post-Hemodialysis Comments HD completed per orders and protocol. sbp in the 130s at the end of treatment. No distress from pt

## 2022-09-22 NOTE — PLAN OF CARE
09/22/22 1307   Post-Acute Status   Post-Acute Authorization Placement   Post-Acute Placement Status Pending payor review/awaiting authorization (if required)   Coverage [employer] managed medicare   Discharge Delays None known at this time   Discharge Plan   Discharge Plan A Skilled Nursing Facility   Discharge Plan B Skilled Nursing Facility     Pt accepted at [facility] by [facility]. Facility submitted for auth.        [id number] scanned into media

## 2022-09-22 NOTE — PT/OT/SLP PROGRESS
Physical Therapy      Patient Name:  Shara Hutchins   MRN:  [id number]    Patient not seen today secondary to Patient unwilling to participate, Patient ill (Comment), Other (Comment) (pt c/o nausea). Will follow-up 9/23/22.

## 2022-09-22 NOTE — PLAN OF CARE
09/22/22 0756   Discharge Reassessment   Assessment Type Discharge Planning Reassessment   Did the patient's condition or plan change since previous assessment? No   Discharge Plan discussed with: Patient   Communicated RODRIGO with patient/caregiver Yes   Discharge Plan A Skilled Nursing Facility   Discharge Plan B Skilled Nursing Facility   DME Needed Upon Discharge  none   Discharge Barriers Identified None   Why the patient remains in the hospital Placement issues   Post-Acute Status   Post-Acute Authorization Placement   Post-Acute Placement Status Referrals Sent   Coverage [facility] administration   Discharge Delays (!) Post-Acute Set-up     Pt has been denied by   [facility].     Additional referrals sent to   [facility] nursing [facility] re[facility]   [facility] skilled nursing   [facility]   [facility]     0935: Pasrr and Locet complete

## 2022-09-22 NOTE — PT/OT/SLP PROGRESS
Occupational Therapy      Patient Name:  Shara Hutchins   MRN:  [id number]    Patient not seen today secondary to Other (Comment) (Declined at this time--pt waiting to speak to pt advocate.). Will follow-up next service date.    9/22/2022

## 2022-09-23 NOTE — PLAN OF CARE
09/23/22 1102   Discharge Reassessment   Assessment Type Discharge Planning Reassessment   Did the patient's condition or plan change since previous assessment? Yes   Discharge Plan discussed with: Patient   Communicated RODRIGO with patient/caregiver Yes   Discharge Plan A Skilled Nursing Facility   Discharge Plan B Skilled Nursing Facility   DME Needed Upon Discharge  none   Why the patient remains in the hospital Insurance issues   Post-Acute Status   Post-Acute Authorization Placement   Post-Acute Placement Status Pending payor medical review/second level review   Coverage [facility] administration   Discharge Delays (!) Post-Acute Set-up     [facility] [place] has also accepted patient. Pt received call from facility. Pt would like to go to [facility] instead of [place] [facility] on the [facility] VA is currently reviewing and also needs updated PT and OT notes. Will send and update when entered.

## 2022-09-23 NOTE — PROGRESS NOTES
[facility] Medicine  Progress Note    Patient name: Shara Hutchins  MRN: [id number]  Admit Date: 9/15/2022   LOS: 6 days     SUBJECTIVE:     Principal problem: Fall    Interval History:  No issues overnight.  I have stopped checking labs as no acute issues and now awaiting SNF placement.  She has been accepted by a SNF and now we are waiting on insurance authorization.     Hospital Course:    Patient had recent laparotomy for an acute abdomen with air under the diaphragm- ischemic sigmoid with sigmoid stricture seen.  S/p resection with ostomy created 9/7.  She went home to her assisted living facility, fell and then stayed on the floor all night as no one was aware she had fallen and she could not get up.  She had hypotension and BP meds were held.  Midodrine started. This has stabilized.  Hypotension is hypothesized to be the etiology of her fall. Nephrology consulted for routine HD and is following.  She has a nephrostomy in place- had at an outside facility.  I'm not sure why it was placed. She has hx of ureteral stricture and recurrent UTIs that had been being treated by recurrent stents.  I reached out to her Urologist, Dr. Barber, and will plan to continue with nephrostomy tube for now with plan to follow up with him in office to re evaluate long term plan for this.  Seen by Dr. Campos and no present Urology needs while hospitalized.  Imagnig shows tube is in good condition.  She has some leakage of urine around it at times. Undergoing routine ostomy care. Seen by PT/OT and CM consulted for SNF.  Awaiting placement.  She is medically clear for discharge to SNF.     Scheduled Meds:   epoetin nica-ebpx (RETACRIT) injection  10,000 Units Intravenous Every Mon, Wed, Fri    levothyroxine  88 mcg Oral Before breakfast    midodrine  2.5 mg Oral TID     Continuous Infusions:  PRN Meds:acetaminophen, acetaminophen, aluminum-magnesium hydroxide-simethicone, dextrose 10%, dextrose 10%, glucagon  (human recombinant), glucose, glucose, HYDROcodone-acetaminophen, hydrOXYzine HCL, LIDOcaine-prilocaine, melatonin, morphine, naloxone, ondansetron, prochlorperazine, simethicone, sodium chloride 0.9%, sodium chloride 0.9%    Review of patient's allergies indicates:   Allergen Reactions    Ciprofloxacin Other (See Comments)     Elevated liver enzymes      Pcn [penicillins] Anaphylaxis     TOLERATES ROCEPHIN WITHOUT DIFFICULTY       Review of Systems: As per interval history    OBJECTIVE:     Vital Signs (Most Recent)  Temp: 98.1 °F (36.7 °C) (09/22/22 1915)  Pulse: 61 (09/22/22 1915)  Resp: 20 (09/22/22 1915)  BP: (!) 108/57 (09/22/22 1915)  SpO2: 97 % (09/22/22 1915)    Vital Signs Range (Last 24H):  Temp:  [97.4 °F (36.3 °C)-98.4 °F (36.9 °C)]   Pulse:  [60-81]   Resp:  [17-20]   BP: ()/(55-65)   SpO2:  [96 %-98 %]     I & O (Last 24H):  Intake/Output Summary (Last 24 hours) at 9/22/2022 2038  Last data filed at 9/22/2022 1400  Gross per 24 hour   Intake 120 ml   Output 950 ml   Net -830 ml         Physical Exam:    Vitals and nursing note reviewed.     Constitutional:       General: Not in acute distress.     Appearance: Well-developed.   HENT:      Head: Normocephalic and atraumatic.   Eyes:      Pupils: Pupils are equal, round, and reactive to light.   Cardiovascular:      Rate and Rhythm: Regular rhythm.   Pulmonary:      Effort: Pulmonary effort is normal.      Breath sounds: Normal breath sounds. No wheezing.   Abdominal:      General: There is no distension.      Palpations: Abdomen is soft.      Tenderness: There is no abdominal tenderness. There is no guarding or rebound.   Ostomy  Musculoskeletal:         General: Normal range of motion.      Cervical back: Normal range of motion.   Skin:     Findings: No rash.   Neurological:      Mental Status: Alert and oriented to person, place, and time.      Cranial Nerves: No cranial nerve deficit.      Sensory: No sensory deficit.   Nephrostomy  tube    Laboratory:  I have reviewed all pertinent lab results within the past 24 hours.  CBC:   Recent Labs   Lab 09/22/22 0914   WBC 8.85   RBC 2.58*   HGB 8.7*   HCT 28.3*      *   MCH 33.7*   MCHC 30.7*       CMP:   Recent Labs   Lab 09/22/22 0914      CALCIUM 8.3*   ALBUMIN 2.0*      K 4.3   CO2 29      BUN 16   CREATININE 3.9*       Microbiology Results (last 7 days)       ** No results found for the last 168 hours. **            Diagnostic Results:      ASSESSMENT/PLAN:         Active Hospital Problems    Diagnosis  POA    *Fall [W19.XXXA]  Yes    ESRD on dialysis MWF [N18.6, Z99.2]  Not Applicable     Chronic    Hypothyroidism [E03.9]  Yes     Chronic      Resolved Hospital Problems   No resolved problems to display.         Plan:       -Fall suspected to be related to hypotension. Anti hypertensives held. Midodrine started. Stable.  -Nephrology following for routine HD  -PT/OT  -CM consulted for SNF. We have an accepting facility and now it has been submitted to her insurance for authorization.      VTE Risk Mitigation (From admission, onward)      None              Department Hospital Medicine  [facility]  Brittaney Hooker MD  Date of service: 09/22/2022

## 2022-09-23 NOTE — PROGRESS NOTES
INPATIENT NEPHROLOGY Progress Note  [facility]    Patient Name: Shara Hutchins  Date: 09/23/2022    Reason for consultation: ESRD    Chief Complaint:   Chief Complaint   Patient presents with    Fall       History of Present Illness:  72-year-old ESRD on HD MWF and history of multiple abdominal surgeries for ischemic colitis who was recently discharged with ischemic colitis s/p surgery on 9/8, readmitted with gen weakness, abd pain and fall, no exac/reliev factors, consulted for dialysis.     Interval History:  9/16- I think she is dry- hold BP meds- UF even  9/17- awaiting SNF  9/18- BP remains low, pain meds helping, leakage around NT noted along with urology note, feels weak  9/19  overall feels better.  The drain is bothering her.  No sob or cp.  Has intermittent nausea  9/20 doing alright today.  No chest pain.  Eating a little better.  It doesn't help that they bring her the same meal every time.    9/21  feels good today.  No chest pain or sob.  In good spirits   9/22 nauseated this am.  No chest pain or sob.  Upset that they keep bringing her meals that are ice cold.  I can't say that I blame her.  That's gross  9/23  afvss.  No distress    Plan of Care:    Assessment:  ESRD on HD MWF  Hypotension/Baseline HTN  Hypokalemia  SHPT  Anemia of CKD    Plan:    - continue HD MWF  - hold home BP meds- gentle UF- okay with fluid challenge if SBP drops to < 90 and symptomatic/orthostatic- be judicious with pain meds  - adjust dialysate  - continue phos binder (home supply)  - increased epogen to 10k with dialysis  --trial of low dose midodrine for hypotension  --prn zofran ok for ESRD patients  --I told her if they keep bringing her inedible food she should contact patient advocacy    Thank you for allowing us to participate in this patient's care. We will continue to follow.    Vital Signs:  Temp Readings from Last 3 Encounters:   09/23/22 97.9 °F (36.6 °C) (Oral)   09/14/22 98 °F (36.7 °C) (Oral)    08/30/22 98.8 °F (37.1 °C) (Skin)       Pulse Readings from Last 3 Encounters:   09/23/22 62   09/14/22 79   08/30/22 (P) 71       BP Readings from Last 3 Encounters:   09/23/22 (!) 105/55   09/14/22 112/68   08/30/22 (!) (P) 125/58       Weight:  Wt Readings from Last 3 Encounters:   09/15/22 70.8 kg (156 lb)   09/08/22 70.9 kg (156 lb 3.2 oz)   08/30/22 69.4 kg (153 lb)     Medications:  Scheduled Meds:   epoetin nica-ebpx (RETACRIT) injection  10,000 Units Intravenous Every Mon, Wed, Fri    levothyroxine  88 mcg Oral Before breakfast    midodrine  2.5 mg Oral TID     Continuous Infusions:  PRN Meds:.acetaminophen, acetaminophen, aluminum-magnesium hydroxide-simethicone, dextrose 10%, dextrose 10%, glucagon (human recombinant), glucose, glucose, HYDROcodone-acetaminophen, hydrOXYzine HCL, LIDOcaine-prilocaine, melatonin, morphine, naloxone, ondansetron, prochlorperazine, simethicone, sodium chloride 0.9%, sodium chloride 0.9%  Current Facility-Administered Medications on File Prior to Encounter   Medication Dose Route Frequency Provider Last Rate Last Admin    electrolyte-S (ISOLYTE)   Intravenous Continuous Raz Ruff MD        lactated ringers infusion   Intravenous Continuous Ruy Beltre MD         Current Outpatient Medications on File Prior to Encounter   Medication Sig Dispense Refill    cholecalciferol, vitamin D3, (VITAMIN D3) 25 mcg (1,000 unit) capsule Take 1 capsule (1,000 Units total) by mouth once daily. 10 capsule 0    diltiaZEM (CARDIZEM CD) 120 MG Cp24 Take 1 capsule (120 mg total) by mouth once daily. 90 capsule 3    ferric citrate (AURYXIA) 210 mg iron Tab Take 2 tablets by mouth 3 (three) times daily with meals.      gabapentin (NEURONTIN) 100 MG capsule Take 100 mg by mouth 2 (two) times daily. HCS      levothyroxine (SYNTHROID) 88 MCG tablet TAKE 1 TABLET BEFORE BREAKFAST (Patient taking differently: Take 88 mcg by mouth before breakfast. TAKE 1 TABLET BEFORE BREAKFAST) 90 tablet  3    LORazepam (ATIVAN) 0.5 MG tablet Take 1 tablet (0.5 mg total) by mouth every evening. 90 tablet 1    oxyCODONE (ROXICODONE) 5 MG immediate release tablet Take 1 tablet (5 mg total) by mouth every 4 (four) hours as needed for Pain. 20 tablet 0    promethazine (PHENERGAN) 25 MG tablet TAKE 1 TABLET (25 MG TOTAL) BY MOUTH 2 (TWO) TIMES DAILY AS NEEDED FOR NAUSEA. 60 tablet 0    rOPINIRole (REQUIP) 0.5 MG tablet Take 0.5 mg by mouth every 8 (eight) hours.      sertraline (ZOLOFT) 50 MG tablet Take 1 tablet (50 mg total) by mouth every evening. Take 50 mg by mouth every evening. 90 tablet 1    traMADoL (ULTRAM) 50 mg tablet Take 1 tablet (50 mg total) by mouth every 6 (six) hours as needed. 30 tablet 0    zinc gluconate 50 mg tablet Take 50 mg by mouth once daily.      hydrALAZINE (APRESOLINE) 50 MG tablet Take 1 tablet (50 mg total) by mouth every 8 (eight) hours. (Patient not taking: Reported on 9/15/2022) 270 tablet 0    [DISCONTINUED] miconazole (MICOTIN) 2 % cream Apply topically 2 (two) times daily. 28 g 0     Review of Systems:  Neg    Physical Exam:  Constitutional: nad, aao x 3  Heart: rrr, no m/r/g, wwp, no edema  Lungs: ctab, no w/r/r/c, no lb  Abdomen: s/nt/nd, +BS, ostomy    Results:  Lab Results   Component Value Date     09/22/2022    K 4.3 09/22/2022     09/22/2022    CO2 29 09/22/2022    BUN 16 09/22/2022    CREATININE 3.9 (H) 09/22/2022    CALCIUM 8.3 (L) 09/22/2022    ANIONGAP 8 09/22/2022    ESTGFRAFRICA 12.5 (A) 07/20/2022    EGFRNONAA 10.9 (A) 07/20/2022       Lab Results   Component Value Date    CALCIUM 8.3 (L) 09/22/2022    PHOS 3.2 09/22/2022       No results for input(s): WBC, RBC, HGB, HCT, PLT, MCV, MCH, MCHC in the last 24 hours.      I have personally reviewed pertinent radiological imaging and reports.    I have spent > 35 minutes providing care for this patient for the above diagnoses. These services have included chart/data/imaging review, evaluation, exam, formulation  of plan, , note preparation, and discussions with staff involved in this patient's care.    Raúl Dietz MD  Nephrology  [facility] [place]  [phone] n

## 2022-09-23 NOTE — PROGRESS NOTES
09/23/22 1745   Post-Hemodialysis Assessment   Rinseback Volume (mL) 250 mL   Blood Volume Processed (Liters) 67.2 L   Dialyzer Clearance Lightly streaked   Duration of Treatment 180 minutes   Additional Fluid Intake (mL) 500 mL   Total UF (mL) 2000 mL   Net Fluid Removal 1500   Patient Response to Treatment tolerated well   Post-Treatment Weight 72 kg (158 lb 11.7 oz)   Treatment Weight Change -2   Arterial bleeding stop time (min) 6 min   Venous bleeding stop time (min) 6 min   Post-Hemodialysis Comments no problems

## 2022-09-23 NOTE — PT/OT/SLP PROGRESS
"Occupational Therapy   Treatment    Name: Shara Hutchins  MRN: [id number]  Admitting Diagnosis:  Fall       Recommendations:     Discharge Recommendations: nursing facility, skilled  Discharge Equipment Recommendations:  none  Barriers to discharge:  None    Assessment:     Shara Hutchins is a 72 y.o. female with a medical diagnosis of Fall. Performance deficits affecting function are weakness, impaired endurance, impaired self care skills, impaired functional mobility, gait instability, pain, edema. Pt only agreeable to limited OT therapy session this PM due to nausea and awaiting dialysis. Pt required increased time for self expression and OT present to provide listening ear and support.    Rehab Prognosis:  Good; patient would benefit from acute skilled OT services to address these deficits and reach maximum level of function.       Plan:     Patient to be seen 3 x/week to address the above listed problems via self-care/home management, therapeutic exercises, therapeutic activities  Plan of Care Expires: 10/17/22  Plan of Care Reviewed with: patient    Subjective     Pain/Comfort:  Pain Rating 1:  (not rated)  Location 1:  ("all over")  Pain Addressed 1: Reposition, Cessation of Activity, Distraction    Objective:     Communicated with: nursing prior to session.  Patient found HOB elevated with bed alarm, colostomy, nephrostomy, peripheral IV, telemetry upon OT entry to room.    General Precautions: Standard, fall   Orthopedic Precautions:N/A   Braces: N/A  Respiratory Status: Room air     Occupational Performance:     Bed Mobility:    declined    Functional Mobility/Transfers:  declined    Activities of Daily Living:  Grooming: setup assistance bed level to brush teeth and wash face      Treatment & Education:  Pt educated on role of OT/POC, importance of OOB/EOB activity, use of call bell, and safety during ADLs, transfers, and functional mobility.     Patient left HOB elevated with all lines intact, call button in " reach, and bed alarm on    GOALS:   Multidisciplinary Problems       Occupational Therapy Goals          Problem: Occupational Therapy    Goal Priority Disciplines Outcome Interventions   Occupational Therapy Goal     OT, PT/OT Ongoing, Progressing    Description: Goals to be met by: 10/17/22     Patient will increase functional independence with ADLs by performing:    UE Dressing with Supervision.  LE Dressing with Supervision.  Grooming while standing at sink with Supervision.  Toileting from toilet with Supervision for hygiene and clothing management.   Toilet transfer to toilet with Supervision.                         Time Tracking:     OT Date of Treatment: 09/23/22  OT Start Time: 1331  OT Stop Time: 1340  OT Total Time (min): 9 min    Billable Minutes:Self Care/Home Management 9    OT/SAMANTHA: OT          9/23/2022

## 2022-09-23 NOTE — PT/OT/SLP PROGRESS
Physical Therapy Treatment    Patient Name:  Shara Hutchins   MRN:  [id number]    Recommendations:     Discharge Recommendations:  nursing facility, skilled   Discharge Equipment Recommendations: none   Barriers to discharge: Decreased caregiver support    Assessment:     Shara Hutchins is a 72 y.o. female admitted with a medical diagnosis of Fall.  She presents with the following impairments/functional limitations:  weakness, impaired endurance, impaired self care skills, impaired functional mobility, gait instability, pain, edema.    Pt found HOB elevated and asking for assistance due to gown/linens being wet from drainage from abdominal incision. Pt agreeable to working with PT for gt training and PT assisted pt with changing gown and brief (brief wet from drainage, not due to incontinence). Pt tolerated session fairly well with generalized weakness and would benefit from SNF prior to discharge home for return to prior level of independent mobility.     Rehab Prognosis: Good and Fair; patient would benefit from acute skilled PT services to address these deficits and reach maximum level of function.    Recent Surgery: * No surgery found *      Plan:     During this hospitalization, patient to be seen 6 x/week to address the identified rehab impairments via gait training, therapeutic activities, therapeutic exercises, neuromuscular re-education and progress toward the following goals:    Plan of Care Expires:  10/16/22    Subjective     Chief Complaint: abdominal discomfort and drainage from incision (dressing all wet and leaking)  Patient/Family Comments/goals: SNF  Pain/Comfort:  Pain Rating 1:  (not rated)  Location 1: abdomen  Pain Addressed 1: Reposition, Distraction      Objective:     Communicated with CHERRY Moreno prior to session.  Patient found HOB elevated with bed alarm, colostomy, peripheral IV, telemetry, JOHN drain upon PT entry to room.     General Precautions: Standard, fall, contact   Orthopedic  Precautions:N/A   Braces: N/A  Respiratory Status: Room air     Functional Mobility:  Bed Mobility:     Scooting: stand by assistance and contact guard assistance  Supine to Sit: stand by assistance with bed rail; once sitting, PT assisted pt with changing gown due to being wet.  Sit to Supine: contact guard assistance  Transfers:     Sit to Stand:  contact guard assistance with rolling walker  Gait: x 40' with rolling walker and CGA for safety due to generalized weakness then pt reported onset of nausea and asked to sit. After gt, PT assisted pt with changing brief as drainage from abdominal incision had wet the dressing and was leaking into her brief.       AM-PAC 6 CLICK MOBILITY          Therapeutic Activities and Exercises:   Pt was educated on the following: call light use, importance of OOB activity and functional mobility to negate the negative effects of prolonged bed rest during this hospitalization, safe transfers/ambulation and discharge planning recommendations/options.      Patient left HOB elevated with all lines intact, call button in reach, bed alarm on, and RN notified..    GOALS:   Multidisciplinary Problems       Physical Therapy Goals          Problem: Physical Therapy    Goal Priority Disciplines Outcome Goal Variances Interventions   Physical Therapy Goal     PT, PT/OT Ongoing, Progressing     Description: Goals to be met by: 10/16/22     Patient will increase functional independence with mobility by performin. Supine to sit with Supervision  2. Sit to stand transfer with Supervision  3. Bed to chair transfer with Supervision using least restrictive assistive device  4. Gait  x 150 feet with Supervision using  least restrictive assistive device                             Time Tracking:     PT Received On: 22  PT Start Time: 1045     PT Stop Time: 1102  PT Total Time (min): 17 min     Billable Minutes: Gait Training 17    Treatment Type: Treatment  PT/PTA: PT     PTA Visit Number:  1     09/23/2022

## 2022-09-24 NOTE — PT/OT/SLP PROGRESS
Physical Therapy Treatment    Patient Name:  Shara Hutchins   MRN:  [id number]    Recommendations:     Discharge Recommendations:  nursing facility, skilled   Discharge Equipment Recommendations: none   Barriers to discharge: Decreased caregiver support    Assessment:     Shara Hutchins is a 72 y.o. female admitted with a medical diagnosis of Fall.  She presents with the following impairments/functional limitations:  weakness, impaired functional mobility, impaired endurance, pain, edema, impaired cardiopulmonary response to activity.    Pt found HOB elevated, required encouragement to participate.Pt mobilizing in and out of bed with supervision only. Ambulated in room x 55 ft with RW SBA/Sup. No LOB. Transferred up to BS chair. Pt tolerated session well with generalized weakness and would benefit from SNF prior to discharge home for return to prior level of independent mobility.     Rehab Prognosis: Good and Fair; patient would benefit from acute skilled PT services to address these deficits and reach maximum level of function.    Recent Surgery: * No surgery found *      Plan:     During this hospitalization, patient to be seen 6 x/week to address the identified rehab impairments via gait training, therapeutic activities, therapeutic exercises and progress toward the following goals:    Plan of Care Expires:  10/16/22    Subjective     Chief Complaint: abdominal discomfort and drainage from incision (dressing all wet and leaking)  Patient/Family Comments/goals: SNF  Pain/Comfort:  Pain Rating 1: 0/10  Pain Rating Post-Intervention 1: 0/10      Objective:     Communicated with CHERRY Nielsen prior to session.  Patient found HOB elevated with peripheral IV, nephrostomy, telemetry, oxygen, colostomy upon PT entry to room.     General Precautions: Standard, fall   Orthopedic Precautions:N/A   Braces: N/A  Respiratory Status: Room air     Functional Mobility:  Bed Mobility:     Scooting: stand by assistance   Supine to  Sit:supervision with bed rail;     Transfers:     Sit to Stand:  stand by assistance with rolling walker  Gait: x 55 ft with rolling walker and SBA, no LOB, slow ana.       AM-PAC 6 CLICK MOBILITY  Turning over in bed (including adjusting bedclothes, sheets and blankets)?: 4  Sitting down on and standing up from a chair with arms (e.g., wheelchair, bedside commode, etc.): 4  Moving from lying on back to sitting on the side of the bed?: 4  Moving to and from a bed to a chair (including a wheelchair)?: 4  Need to walk in hospital room?: 3  Climbing 3-5 steps with a railing?: 2  Basic Mobility Total Score: 21       Therapeutic Activities and Exercises:   Pt was educated on the following: call light use, importance of OOB activity and functional mobility to negate the negative effects of prolonged bed rest during this hospitalization, safe transfers/ambulation and discharge planning recommendations/options.      Patient left up in chair with all lines intact and call button in reach..    GOALS:   Multidisciplinary Problems       Physical Therapy Goals          Problem: Physical Therapy    Goal Priority Disciplines Outcome Goal Variances Interventions   Physical Therapy Goal     PT, PT/OT Ongoing, Progressing     Description: Goals to be met by: 10/16/22     Patient will increase functional independence with mobility by performin. Supine to sit with Supervision  2. Sit to stand transfer with Supervision  3. Bed to chair transfer with Supervision using least restrictive assistive device  4. Gait  x 150 feet with Supervision using  least restrictive assistive device                             Time Tracking:     PT Received On: 22  PT Start Time: 1009     PT Stop Time: 1020  PT Total Time (min): 11 min     Billable Minutes: Gait Training 11    Treatment Type: Treatment  PT/PTA: PT     PTA Visit Number: 1     2022

## 2022-09-24 NOTE — PROGRESS NOTES
[facility] Medicine  Progress Note    Patient name: Shara Hutchins  MRN: [id number]  Admit Date: 9/15/2022   LOS: 8 days     SUBJECTIVE:     Principal problem: Fall    Interval History:  Patient with vomiting yesterday.  None today but still nauseated.  Phenergan added which she reports has worked for her in the past.  Ate some of breakfast and half of lunch.  Having good output from her ostomy and no abdominal distension- I do not suspect obstruction.    Hospital Course:    Patient had recent laparotomy for an acute abdomen with air under the diaphragm- ischemic sigmoid with sigmoid stricture seen.  S/p resection with ostomy created 9/7.  She went home to her assisted living facility, fell and then stayed on the floor all night as no one was aware she had fallen and she could not get up.  She had hypotension and BP meds were held.  Midodrine started. This has stabilized.  Hypotension is hypothesized to be the etiology of her fall. Nephrology consulted for routine HD and is following.  She has a nephrostomy in place- had at an outside facility.  I'm not sure why it was placed. She has hx of ureteral stricture and recurrent UTIs that had been being treated by recurrent stents.  I reached out to her Urologist, Dr. Barber, and will plan to continue with nephrostomy tube for now with plan to follow up with him in office to re evaluate long term plan for this.  Seen by Dr. Campos and no present Urology needs while hospitalized.  Imagnig shows tube is in good condition.  She has some leakage of urine around it at times. Undergoing routine ostomy care. Seen by PT/OT and CM consulted for SNF.  Awaiting placement.  She is medically clear for discharge to SNF. We have an accepting facility and am now waiting on insurance approval.    Scheduled Meds:   epoetin nica-ebpx (RETACRIT) injection  10,000 Units Intravenous Every Mon, Wed, Fri    levothyroxine  88 mcg Oral Before breakfast    midodrine  2.5 mg  Oral TID     Continuous Infusions:  PRN Meds:acetaminophen, acetaminophen, aluminum-magnesium hydroxide-simethicone, dextrose 10%, dextrose 10%, glucagon (human recombinant), glucose, glucose, HYDROcodone-acetaminophen, hydrOXYzine HCL, LIDOcaine-prilocaine, melatonin, morphine, naloxone, ondansetron, promethazine (PHENERGAN) IVPB, simethicone, sodium chloride 0.9%, sodium chloride 0.9%    Review of patient's allergies indicates:   Allergen Reactions    Ciprofloxacin Other (See Comments)     Elevated liver enzymes      Pcn [penicillins] Anaphylaxis     TOLERATES ROCEPHIN WITHOUT DIFFICULTY       Review of Systems: As per interval history    OBJECTIVE:     Vital Signs (Most Recent)  Temp: 98.2 °F (36.8 °C) (09/24/22 0825)  Pulse: 72 (09/24/22 1233)  Resp: 18 (09/24/22 1233)  BP: 102/79 (09/24/22 1233)  SpO2: 98 % (09/24/22 1233)    Vital Signs Range (Last 24H):  Temp:  [97.9 °F (36.6 °C)-98.2 °F (36.8 °C)]   Pulse:  [59-72]   Resp:  [17-20]   BP: ()/(53-79)   SpO2:  [98 %]     I & O (Last 24H):  Intake/Output Summary (Last 24 hours) at 9/24/2022 1612  Last data filed at 9/24/2022 1412  Gross per 24 hour   Intake 715 ml   Output 2750 ml   Net -2035 ml         Physical Exam:    Vitals and nursing note reviewed.     Constitutional:       General: Not in acute distress.     Appearance: Well-developed.   HENT:      Head: Normocephalic and atraumatic.   Eyes:      Pupils: Pupils are equal, round, and reactive to light.   Cardiovascular:      Rate and Rhythm: Regular rhythm.   Pulmonary:      Effort: Pulmonary effort is normal.      Breath sounds: Normal breath sounds. No wheezing.   Abdominal:      General: There is no distension.      Palpations: Abdomen is soft.      Tenderness: There is no abdominal tenderness. There is no guarding or rebound.   Ostomy  Musculoskeletal:         General: Normal range of motion.      Cervical back: Normal range of motion.   Skin:     Findings: No rash.   Neurological:      Mental  Status: Alert and oriented to person, place, and time.      Cranial Nerves: No cranial nerve deficit.      Sensory: No sensory deficit.   Nephrostomy tube    Laboratory:  I have reviewed all pertinent lab results within the past 24 hours.  CBC:   Recent Labs   Lab 09/22/22 0914   WBC 8.85   RBC 2.58*   HGB 8.7*   HCT 28.3*      *   MCH 33.7*   MCHC 30.7*       CMP:   Recent Labs   Lab 09/22/22 0914      CALCIUM 8.3*   ALBUMIN 2.0*      K 4.3   CO2 29      BUN 16   CREATININE 3.9*       Microbiology Results (last 7 days)       ** No results found for the last 168 hours. **            Diagnostic Results:      ASSESSMENT/PLAN:         Active Hospital Problems    Diagnosis  POA    *Fall [W19.XXXA]  Yes    ESRD on dialysis MWF [N18.6, Z99.2]  Not Applicable     Chronic    Hypothyroidism [E03.9]  Yes     Chronic      Resolved Hospital Problems   No resolved problems to display.         Plan:       -Fall suspected to be related to hypotension. Anti hypertensives held. Midodrine started. Stable.  -Nephrology following for routine HD  -PT/OT  -CM consulted for SNF. We have an accepting facility and now it has been submitted to her insurance for authorization.  We did not receive it as of yet and thus will be here through the weekend to pick this process back up on Monday      VTE Risk Mitigation (From admission, onward)      None              Department Hospital Medicine  [facility]  Brittaney Hooker MD  Date of service: 09/24/2022

## 2022-09-24 NOTE — PROGRESS NOTES
[facility] Medicine  Progress Note    Patient name: Shara Hutchins  MRN: [id number]  Admit Date: 9/15/2022   LOS: 7 days     SUBJECTIVE:     Principal problem: Fall    Interval History:  No issues overnight.  I have stopped checking labs as no acute issues and now awaiting SNF placement.  She has been accepted by a SNF and now we are waiting on insurance authorization.     Hospital Course:    Patient had recent laparotomy for an acute abdomen with air under the diaphragm- ischemic sigmoid with sigmoid stricture seen.  S/p resection with ostomy created 9/7.  She went home to her assisted living facility, fell and then stayed on the floor all night as no one was aware she had fallen and she could not get up.  She had hypotension and BP meds were held.  Midodrine started. This has stabilized.  Hypotension is hypothesized to be the etiology of her fall. Nephrology consulted for routine HD and is following.  She has a nephrostomy in place- had at an outside facility.  I'm not sure why it was placed. She has hx of ureteral stricture and recurrent UTIs that had been being treated by recurrent stents.  I reached out to her Urologist, Dr. Barber, and will plan to continue with nephrostomy tube for now with plan to follow up with him in office to re evaluate long term plan for this.  Seen by Dr. Campos and no present Urology needs while hospitalized.  Imagnig shows tube is in good condition.  She has some leakage of urine around it at times. Undergoing routine ostomy care. Seen by PT/OT and CM consulted for SNF.  Awaiting placement.  She is medically clear for discharge to SNF.     Scheduled Meds:   epoetin nica-ebpx (RETACRIT) injection  10,000 Units Intravenous Every Mon, Wed, Fri    levothyroxine  88 mcg Oral Before breakfast    midodrine  2.5 mg Oral TID     Continuous Infusions:  PRN Meds:acetaminophen, acetaminophen, aluminum-magnesium hydroxide-simethicone, dextrose 10%, dextrose 10%, glucagon  (human recombinant), glucose, glucose, HYDROcodone-acetaminophen, hydrOXYzine HCL, LIDOcaine-prilocaine, melatonin, morphine, naloxone, ondansetron, prochlorperazine, simethicone, sodium chloride 0.9%, sodium chloride 0.9%    Review of patient's allergies indicates:   Allergen Reactions    Ciprofloxacin Other (See Comments)     Elevated liver enzymes      Pcn [penicillins] Anaphylaxis     TOLERATES ROCEPHIN WITHOUT DIFFICULTY       Review of Systems: As per interval history    OBJECTIVE:     Vital Signs (Most Recent)  Temp: 97.9 °F (36.6 °C) (09/23/22 1926)  Pulse: 62 (09/23/22 1926)  Resp: 17 (09/23/22 1926)  BP: 121/63 (09/23/22 1926)  SpO2: 98 % (09/23/22 1926)    Vital Signs Range (Last 24H):  Temp:  [97.9 °F (36.6 °C)-98.2 °F (36.8 °C)]   Pulse:  [59-98]   Resp:  [16-20]   BP: ()/(53-93)   SpO2:  [97 %-98 %]     I & O (Last 24H):  Intake/Output Summary (Last 24 hours) at 9/23/2022 2021  Last data filed at 9/23/2022 1745  Gross per 24 hour   Intake 980 ml   Output 3000 ml   Net -2020 ml         Physical Exam:    Vitals and nursing note reviewed.     Constitutional:       General: Not in acute distress.     Appearance: Well-developed.   HENT:      Head: Normocephalic and atraumatic.   Eyes:      Pupils: Pupils are equal, round, and reactive to light.   Cardiovascular:      Rate and Rhythm: Regular rhythm.   Pulmonary:      Effort: Pulmonary effort is normal.      Breath sounds: Normal breath sounds. No wheezing.   Abdominal:      General: There is no distension.      Palpations: Abdomen is soft.      Tenderness: There is no abdominal tenderness. There is no guarding or rebound.   Ostomy  Musculoskeletal:         General: Normal range of motion.      Cervical back: Normal range of motion.   Skin:     Findings: No rash.   Neurological:      Mental Status: Alert and oriented to person, place, and time.      Cranial Nerves: No cranial nerve deficit.      Sensory: No sensory deficit.   Nephrostomy  tube    Laboratory:  I have reviewed all pertinent lab results within the past 24 hours.  CBC:   Recent Labs   Lab 09/22/22 0914   WBC 8.85   RBC 2.58*   HGB 8.7*   HCT 28.3*      *   MCH 33.7*   MCHC 30.7*       CMP:   Recent Labs   Lab 09/22/22 0914      CALCIUM 8.3*   ALBUMIN 2.0*      K 4.3   CO2 29      BUN 16   CREATININE 3.9*       Microbiology Results (last 7 days)       ** No results found for the last 168 hours. **            Diagnostic Results:      ASSESSMENT/PLAN:         Active Hospital Problems    Diagnosis  POA    *Fall [W19.XXXA]  Yes    ESRD on dialysis MWF [N18.6, Z99.2]  Not Applicable     Chronic    Hypothyroidism [E03.9]  Yes     Chronic      Resolved Hospital Problems   No resolved problems to display.         Plan:       -Fall suspected to be related to hypotension. Anti hypertensives held. Midodrine started. Stable.  -Nephrology following for routine HD  -PT/OT  -CM consulted for SNF. We have an accepting facility and now it has been submitted to her insurance for authorization.  We did not receive it as of yet and thus will be here through the weekend to pick this process back up on Monday      VTE Risk Mitigation (From admission, onward)      None              Department Hospital Medicine  [facility]  Brittaney Hooker MD  Date of service: 09/23/2022

## 2022-09-24 NOTE — PROGRESS NOTES
INPATIENT NEPHROLOGY Progress Note  [facility]    Patient Name: Shara Hutchins  Date: 09/24/2022    Reason for consultation: ESRD    Chief Complaint:   Chief Complaint   Patient presents with    Fall       History of Present Illness:  72-year-old ESRD on HD MWF and history of multiple abdominal surgeries for ischemic colitis who was recently discharged with ischemic colitis s/p surgery on 9/8, readmitted with gen weakness, abd pain and fall, no exac/reliev factors, consulted for dialysis.     Interval History:  9/16- I think she is dry- hold BP meds- UF even  9/17- awaiting SNF  9/18- BP remains low, pain meds helping, leakage around NT noted along with urology note, feels weak  9/19  overall feels better.  The drain is bothering her.  No sob or cp.  Has intermittent nausea  9/20 doing alright today.  No chest pain.  Eating a little better.  It doesn't help that they bring her the same meal every time.    9/21  feels good today.  No chest pain or sob.  In good spirits   9/22 nauseated this am.  No chest pain or sob.  Upset that they keep bringing her meals that are ice cold.  I can't say that I blame her.  That's gross  9/23  afvss.  No distress  9/24  AFVSS.  No acute events.  Waiting on placement    Plan of Care:    Assessment:  ESRD on HD MWF  Hypotension/Baseline HTN  Hypokalemia  SHPT  Anemia of CKD    Plan:    - continue HD MWF  - hold home BP meds- gentle UF- okay with fluid challenge if SBP drops to < 90 and symptomatic/orthostatic- be judicious with pain meds  - adjust dialysate  - continue phos binder (home supply)  - increased epogen to 10k with dialysis  -started low dose midodrine for hypotension  --prn zofran ok for ESRD patients       Thank you for allowing us to participate in this patient's care. We will continue to follow.    Vital Signs:  Temp Readings from Last 3 Encounters:   09/24/22 98.2 °F (36.8 °C) (Oral)   09/14/22 98 °F (36.7 °C) (Oral)   08/30/22 98.8 °F (37.1 °C) (Skin)        Pulse Readings from Last 3 Encounters:   09/24/22 69   09/14/22 79   08/30/22 (P) 71       BP Readings from Last 3 Encounters:   09/24/22 (!) 95/56   09/14/22 112/68   08/30/22 (!) (P) 125/58       Weight:  Wt Readings from Last 3 Encounters:   09/15/22 70.8 kg (156 lb)   09/08/22 70.9 kg (156 lb 3.2 oz)   08/30/22 69.4 kg (153 lb)     Medications:  Scheduled Meds:   epoetin nica-ebpx (RETACRIT) injection  10,000 Units Intravenous Every Mon, Wed, Fri    levothyroxine  88 mcg Oral Before breakfast    midodrine  2.5 mg Oral TID     Continuous Infusions:  PRN Meds:.acetaminophen, acetaminophen, aluminum-magnesium hydroxide-simethicone, dextrose 10%, dextrose 10%, glucagon (human recombinant), glucose, glucose, HYDROcodone-acetaminophen, hydrOXYzine HCL, LIDOcaine-prilocaine, melatonin, morphine, naloxone, ondansetron, prochlorperazine, simethicone, sodium chloride 0.9%, sodium chloride 0.9%  Current Facility-Administered Medications on File Prior to Encounter   Medication Dose Route Frequency Provider Last Rate Last Admin    electrolyte-S (ISOLYTE)   Intravenous Continuous Raz Ruff MD        lactated ringers infusion   Intravenous Continuous Ruy Beltre MD         Current Outpatient Medications on File Prior to Encounter   Medication Sig Dispense Refill    cholecalciferol, vitamin D3, (VITAMIN D3) 25 mcg (1,000 unit) capsule Take 1 capsule (1,000 Units total) by mouth once daily. 10 capsule 0    diltiaZEM (CARDIZEM CD) 120 MG Cp24 Take 1 capsule (120 mg total) by mouth once daily. 90 capsule 3    ferric citrate (AURYXIA) 210 mg iron Tab Take 2 tablets by mouth 3 (three) times daily with meals.      gabapentin (NEURONTIN) 100 MG capsule Take 100 mg by mouth 2 (two) times daily. HCS      levothyroxine (SYNTHROID) 88 MCG tablet TAKE 1 TABLET BEFORE BREAKFAST (Patient taking differently: Take 88 mcg by mouth before breakfast. TAKE 1 TABLET BEFORE BREAKFAST) 90 tablet 3    LORazepam (ATIVAN) 0.5 MG  tablet Take 1 tablet (0.5 mg total) by mouth every evening. 90 tablet 1    oxyCODONE (ROXICODONE) 5 MG immediate release tablet Take 1 tablet (5 mg total) by mouth every 4 (four) hours as needed for Pain. 20 tablet 0    promethazine (PHENERGAN) 25 MG tablet TAKE 1 TABLET (25 MG TOTAL) BY MOUTH 2 (TWO) TIMES DAILY AS NEEDED FOR NAUSEA. 60 tablet 0    rOPINIRole (REQUIP) 0.5 MG tablet Take 0.5 mg by mouth every 8 (eight) hours.      sertraline (ZOLOFT) 50 MG tablet Take 1 tablet (50 mg total) by mouth every evening. Take 50 mg by mouth every evening. 90 tablet 1    traMADoL (ULTRAM) 50 mg tablet Take 1 tablet (50 mg total) by mouth every 6 (six) hours as needed. 30 tablet 0    zinc gluconate 50 mg tablet Take 50 mg by mouth once daily.      hydrALAZINE (APRESOLINE) 50 MG tablet Take 1 tablet (50 mg total) by mouth every 8 (eight) hours. (Patient not taking: Reported on 9/15/2022) 270 tablet 0    [DISCONTINUED] miconazole (MICOTIN) 2 % cream Apply topically 2 (two) times daily. 28 g 0     Review of Systems:  Neg    Physical Exam:  Constitutional: nad, aao x 3  Heart: rrr, no m/r/g, wwp, no edema  Lungs: ctab, no w/r/r/c, no lb  Abdomen: s/nt/nd, +BS, ostomy    Results:  Lab Results   Component Value Date     09/22/2022    K 4.3 09/22/2022     09/22/2022    CO2 29 09/22/2022    BUN 16 09/22/2022    CREATININE 3.9 (H) 09/22/2022    CALCIUM 8.3 (L) 09/22/2022    ANIONGAP 8 09/22/2022    ESTGFRAFRICA 12.5 (A) 07/20/2022    EGFRNONAA 10.9 (A) 07/20/2022       Lab Results   Component Value Date    CALCIUM 8.3 (L) 09/22/2022    PHOS 3.2 09/22/2022       No results for input(s): WBC, RBC, HGB, HCT, PLT, MCV, MCH, MCHC in the last 24 hours.      I have personally reviewed pertinent radiological imaging and reports.    I have spent > 35 minutes providing care for this patient for the above diagnoses. These services have included chart/data/imaging review, evaluation, exam, formulation of plan, , note  preparation, and discussions with staff involved in this patient's care.    Raúl Dietz MD  Nephrology  [facility] [place]  [phone] n

## 2022-09-25 NOTE — PROGRESS NOTES
INPATIENT NEPHROLOGY Progress Note  [facility]    Patient Name: Shara Hutchins  Date: 09/25/2022    Reason for consultation: ESRD    Chief Complaint:   Chief Complaint   Patient presents with    Fall       History of Present Illness:  72-year-old ESRD on HD MWF and history of multiple abdominal surgeries for ischemic colitis who was recently discharged with ischemic colitis s/p surgery on 9/8, readmitted with gen weakness, abd pain and fall, no exac/reliev factors, consulted for dialysis.     Interval History:  9/16- I think she is dry- hold BP meds- UF even  9/17- awaiting SNF  9/18- BP remains low, pain meds helping, leakage around NT noted along with urology note, feels weak  9/19  overall feels better.  The drain is bothering her.  No sob or cp.  Has intermittent nausea  9/20 doing alright today.  No chest pain.  Eating a little better.  It doesn't help that they bring her the same meal every time.    9/21  feels good today.  No chest pain or sob.  In good spirits   9/22 nauseated this am.  No chest pain or sob.  Upset that they keep bringing her meals that are ice cold.  I can't say that I blame her.  That's gross  9/23  afvss.  No distress  9/24  AFVSS.  No acute events.  Waiting on placement  9/25 c/o of dysuria.  No vomiting today. Had episodes of nausea with vomiting yesterday    Plan of Care:    Assessment:  ESRD on HD MWF  Hypotension/Baseline HTN  Hypokalemia  SHPT  Anemia of CKD    Plan:    - continue HD MWF  - hold home BP meds- gentle UF- okay with fluid challenge if SBP drops to < 90 and symptomatic/orthostatic- be judicious with pain meds  - adjust dialysate  - continue phos binder (home supply)  - increased epogen to 10k with dialysis  -started low dose midodrine for hypotension  --prn zofran ok for ESRD patients       Thank you for allowing us to participate in this patient's care. We will continue to follow.    Vital Signs:  Temp Readings from Last 3 Encounters:   09/25/22 97.7  °F (36.5 °C) (Oral)   09/14/22 98 °F (36.7 °C) (Oral)   08/30/22 98.8 °F (37.1 °C) (Skin)       Pulse Readings from Last 3 Encounters:   09/25/22 64   09/14/22 79   08/30/22 (P) 71       BP Readings from Last 3 Encounters:   09/25/22 (!) 115/49   09/14/22 112/68   08/30/22 (!) (P) 125/58       Weight:  Wt Readings from Last 3 Encounters:   09/15/22 70.8 kg (156 lb)   09/08/22 70.9 kg (156 lb 3.2 oz)   08/30/22 69.4 kg (153 lb)     Medications:  Scheduled Meds:   epoetin nica-ebpx (RETACRIT) injection  10,000 Units Intravenous Every Mon, Wed, Fri    levothyroxine  88 mcg Oral Before breakfast    midodrine  2.5 mg Oral TID     Continuous Infusions:  PRN Meds:.acetaminophen, acetaminophen, aluminum-magnesium hydroxide-simethicone, dextrose 10%, dextrose 10%, glucagon (human recombinant), glucose, glucose, HYDROcodone-acetaminophen, hydrOXYzine HCL, LIDOcaine-prilocaine, melatonin, morphine, naloxone, ondansetron, promethazine (PHENERGAN) IVPB, simethicone, sodium chloride 0.9%, sodium chloride 0.9%  Current Facility-Administered Medications on File Prior to Encounter   Medication Dose Route Frequency Provider Last Rate Last Admin    electrolyte-S (ISOLYTE)   Intravenous Continuous Raz Ruff MD        lactated ringers infusion   Intravenous Continuous Ruy Beltre MD         Current Outpatient Medications on File Prior to Encounter   Medication Sig Dispense Refill    cholecalciferol, vitamin D3, (VITAMIN D3) 25 mcg (1,000 unit) capsule Take 1 capsule (1,000 Units total) by mouth once daily. 10 capsule 0    diltiaZEM (CARDIZEM CD) 120 MG Cp24 Take 1 capsule (120 mg total) by mouth once daily. 90 capsule 3    ferric citrate (AURYXIA) 210 mg iron Tab Take 2 tablets by mouth 3 (three) times daily with meals.      gabapentin (NEURONTIN) 100 MG capsule Take 100 mg by mouth 2 (two) times daily. HCS      levothyroxine (SYNTHROID) 88 MCG tablet TAKE 1 TABLET BEFORE BREAKFAST (Patient taking differently: Take 88 mcg  by mouth before breakfast. TAKE 1 TABLET BEFORE BREAKFAST) 90 tablet 3    LORazepam (ATIVAN) 0.5 MG tablet Take 1 tablet (0.5 mg total) by mouth every evening. 90 tablet 1    oxyCODONE (ROXICODONE) 5 MG immediate release tablet Take 1 tablet (5 mg total) by mouth every 4 (four) hours as needed for Pain. 20 tablet 0    promethazine (PHENERGAN) 25 MG tablet TAKE 1 TABLET (25 MG TOTAL) BY MOUTH 2 (TWO) TIMES DAILY AS NEEDED FOR NAUSEA. 60 tablet 0    rOPINIRole (REQUIP) 0.5 MG tablet Take 0.5 mg by mouth every 8 (eight) hours.      sertraline (ZOLOFT) 50 MG tablet Take 1 tablet (50 mg total) by mouth every evening. Take 50 mg by mouth every evening. 90 tablet 1    traMADoL (ULTRAM) 50 mg tablet Take 1 tablet (50 mg total) by mouth every 6 (six) hours as needed. 30 tablet 0    zinc gluconate 50 mg tablet Take 50 mg by mouth once daily.      hydrALAZINE (APRESOLINE) 50 MG tablet Take 1 tablet (50 mg total) by mouth every 8 (eight) hours. (Patient not taking: Reported on 9/15/2022) 270 tablet 0    [DISCONTINUED] miconazole (MICOTIN) 2 % cream Apply topically 2 (two) times daily. 28 g 0     Review of Systems:  Neg    Physical Exam:  Constitutional: nad, aao x 3  Heart: rrr, no m/r/g, wwp, no edema  Lungs: ctab, no w/r/r/c, no lb  Abdomen: s/nt/nd, +BS, ostomy    Results:  Lab Results   Component Value Date     09/22/2022    K 4.3 09/22/2022     09/22/2022    CO2 29 09/22/2022    BUN 16 09/22/2022    CREATININE 3.9 (H) 09/22/2022    CALCIUM 8.3 (L) 09/22/2022    ANIONGAP 8 09/22/2022    ESTGFRAFRICA 12.5 (A) 07/20/2022    EGFRNONAA 10.9 (A) 07/20/2022       Lab Results   Component Value Date    CALCIUM 8.3 (L) 09/22/2022    PHOS 3.2 09/22/2022       No results for input(s): WBC, RBC, HGB, HCT, PLT, MCV, MCH, MCHC in the last 24 hours.      I have personally reviewed pertinent radiological imaging and reports.    I have spent > 35 minutes providing care for this patient for the above diagnoses. These services  have included chart/data/imaging review, evaluation, exam, formulation of plan, , note preparation, and discussions with staff involved in this patient's care.    Raúl Dietz MD  Nephrology  [facility] [place]  [phone] n

## 2022-09-25 NOTE — PLAN OF CARE
Problem: Adult Inpatient Plan of Care  Goal: Plan of Care Review  Outcome: Ongoing, Progressing  Goal: Optimal Comfort and Wellbeing  Outcome: Ongoing, Progressing  Goal: Readiness for Transition of Care  Outcome: Ongoing, Progressing     Problem: Fluid and Electrolyte Imbalance (Acute Kidney Injury/Impairment)  Goal: Fluid and Electrolyte Balance  Outcome: Ongoing, Progressing     Problem: Renal Function Impairment (Acute Kidney Injury/Impairment)  Goal: Effective Renal Function  Outcome: Ongoing, Progressing     Problem: Skin Injury Risk Increased  Goal: Skin Health and Integrity  Outcome: Ongoing, Progressing     Problem: Infection  Goal: Absence of Infection Signs and Symptoms  Outcome: Ongoing, Progressing

## 2022-09-25 NOTE — PROGRESS NOTES
[facility] Medicine  Progress Note    Patient name: Shara Hutchins  MRN: [id number]  Admit Date: 9/15/2022   LOS: 9 days     SUBJECTIVE:     Principal problem: Fall    Interval History:  No vomiting today.  Still having a lot of gas- we discussed that I would schedule simethicone rather than prn. She reports pain when she urinates- UA ordered.  She tells me Dr. Dietz has ordered a culture from her nephrostomy tube.  When I examine this tube today, it appears it is going into the abdomen and now that I have done a thorough chart review, this appears to be a gabbi drain that was not pulled because it was having a good deal of serous drainage.     Hospital Course:    Patient had recent laparotomy for an acute abdomen with air under the diaphragm- ischemic sigmoid with sigmoid stricture seen.  S/p resection with ostomy created 9/7.  She went home to her assisted living facility, fell and then stayed on the floor all night as no one was aware she had fallen and she could not get up.  She had hypotension and BP meds were held.  Midodrine started. This has stabilized.  Hypotension is hypothesized to be the etiology of her fall. Nephrology consulted for routine HD and is following.  What was previously thought to be a nephrostomy tube may be a GABBI drain from her prior sigmoid resection.  I will consult Surgery tomorrow to evaluate, clarify, and if this is a GABBI drain, to see if able to pull this drain. Undergoing routine ostomy care. Seen by PT/OT and CM consulted for SNF.  Awaiting placement.  She is medically clear for discharge to SNF. We have an accepting facility and am now waiting on insurance approval.    Scheduled Meds:   doxycycline  100 mg Oral Q12H    epoetin nica-ebpx (RETACRIT) injection  10,000 Units Intravenous Every Mon, Wed, Fri    levothyroxine  88 mcg Oral Before breakfast    midodrine  2.5 mg Oral TID    simethicone  1 tablet Oral TID PC     Continuous Infusions:  PRN Meds:acetaminophen,  acetaminophen, aluminum-magnesium hydroxide-simethicone, dextrose 10%, dextrose 10%, glucagon (human recombinant), glucose, glucose, HYDROcodone-acetaminophen, hydrOXYzine HCL, LIDOcaine-prilocaine, melatonin, morphine, naloxone, ondansetron, promethazine (PHENERGAN) IVPB, sodium chloride 0.9%, sodium chloride 0.9%    Review of patient's allergies indicates:   Allergen Reactions    Ciprofloxacin Other (See Comments)     Elevated liver enzymes      Pcn [penicillins] Anaphylaxis     TOLERATES ROCEPHIN WITHOUT DIFFICULTY       Review of Systems: As per interval history    OBJECTIVE:     Vital Signs (Most Recent)  Temp: 97.7 °F (36.5 °C) (09/25/22 0847)  Pulse: 64 (09/25/22 0847)  Resp: 18 (09/25/22 0847)  BP: (!) 115/49 (09/25/22 0847)  SpO2: 97 % (09/25/22 0356)    Vital Signs Range (Last 24H):  Temp:  [97.7 °F (36.5 °C)-98.1 °F (36.7 °C)]   Pulse:  [62-64]   Resp:  [17-18]   BP: ()/(49-74)   SpO2:  [96 %-97 %]     I & O (Last 24H):  Intake/Output Summary (Last 24 hours) at 9/25/2022 1340  Last data filed at 9/25/2022 0154  Gross per 24 hour   Intake 50 ml   Output 650 ml   Net -600 ml         Physical Exam:    Vitals and nursing note reviewed.     Constitutional:       General: Not in acute distress.     Appearance: Well-developed.   HENT:      Head: Normocephalic and atraumatic.   Eyes:      Pupils: Pupils are equal, round, and reactive to light.   Cardiovascular:      Rate and Rhythm: Regular rhythm.   Pulmonary:      Effort: Pulmonary effort is normal.      Breath sounds: Normal breath sounds. No wheezing.   Abdominal:      General: There is no distension.      Palpations: Abdomen is soft.      Tenderness: There is no abdominal tenderness. There is no guarding or rebound.   Ostomy  Musculoskeletal:         General: Normal range of motion.      Cervical back: Normal range of motion.   Skin:     Findings: No rash.   Neurological:      Mental Status: Alert and oriented to person, place, and time.      Cranial  Nerves: No cranial nerve deficit.      Sensory: No sensory deficit.   Nephrostomy tube    Laboratory:  I have reviewed all pertinent lab results within the past 24 hours.  CBC:   Recent Labs   Lab 09/22/22 0914   WBC 8.85   RBC 2.58*   HGB 8.7*   HCT 28.3*      *   MCH 33.7*   MCHC 30.7*       CMP:   Recent Labs   Lab 09/22/22 0914      CALCIUM 8.3*   ALBUMIN 2.0*      K 4.3   CO2 29      BUN 16   CREATININE 3.9*       Microbiology Results (last 7 days)       Procedure Component Value Units Date/Time    Urine Culture High Risk [[id number]]     Order Status: No result Specimen: Urine, Clean Catch             Diagnostic Results:      ASSESSMENT/PLAN:         Active Hospital Problems    Diagnosis  POA    *Fall [W19.XXXA]  Yes    ESRD on dialysis MWF [N18.6, Z99.2]  Not Applicable     Chronic    Hypothyroidism [E03.9]  Yes     Chronic      Resolved Hospital Problems   No resolved problems to display.         Plan:     -It was previously thought that the right abdomen tube was a nephrostomy tube, but on closer inspection and close review of the chart, I suspect this is a JOHN drain from her abdominal surgery that was left in because of continued serous drainage and then she was readmitted almost immediately of being discharged and never made the post surgical follow up to have it pulled. I am consulting surgery to evaluate, clarify and possibly pull this if it is a JOHN drain.  -Fall suspected to be related to hypotension. Anti hypertensives held. Midodrine started. Stable.  -Nephrology following for routine HD  -PT/OT  -CM consulted for SNF. We have an accepting facility and now it has been submitted to her insurance for authorization.  We did not receive it as of yet and thus will be here through the weekend to pick this process back up on Monday      VTE Risk Mitigation (From admission, onward)      None              Department Hospital Medicine  [facility]  Brittaney Hooker,  MD  Date of service: 09/25/2022

## 2022-09-26 NOTE — PROGRESS NOTES
[facility] Medicine  Progress Note    Patient name: Shara Hutchins  MRN: [id number]  Admit Date: 9/15/2022   LOS: 10 days     SUBJECTIVE:     Principal problem: Fall    Interval History:  Patient has been accepted to SNF and we have insurance approval. The facility can take her tomorrow. UA did suggest UTI. This was a sample obtained from the hat.  Repeating a straight cath to get a clean catch sample.  On po Doxy per Nephrology.  Has a PCN allergy.   General surgery consulted to evaluate drain which is suspected to be a gabbi drain.  Tubing snapped today per RN.  AM labs obtained and alk phosph is more elevated than prior.  Repeating in AM.  Abdominal exam is benign.     Hospital Course:    Patient had recent laparotomy for an acute abdomen with air under the diaphragm- ischemic sigmoid with sigmoid stricture seen.  S/p resection with ostomy created 9/7.  She went home to her assisted living facility, fell and then stayed on the floor all night as no one was aware she had fallen and she could not get up.  She had hypotension and BP meds were held.  Midodrine started. This has stabilized.  Hypotension is hypothesized to be the etiology of her fall. Nephrology consulted for routine HD and is following.  What was previously thought to be a nephrostomy tube may be a GABBI drain from her prior sigmoid resection.  I will consult Surgery tomorrow to evaluate, clarify, and if this is a GABBI drain, to see if able to pull this drain. Undergoing routine ostomy care. Seen by PT/OT and CM consulted for SNF.  Awaiting placement.  She is medically clear for discharge to SNF. We have an accepting facility and am now waiting on insurance approval.    Scheduled Meds:   doxycycline  100 mg Oral Q12H    epoetin nica-ebpx (RETACRIT) injection  10,000 Units Intravenous Every Mon, Wed, Fri    levothyroxine  88 mcg Oral Before breakfast    midodrine  2.5 mg Oral TID    simethicone  1 tablet Oral TID PC     Continuous  Infusions:  PRN Meds:acetaminophen, acetaminophen, aluminum-magnesium hydroxide-simethicone, dextrose 10%, dextrose 10%, glucagon (human recombinant), glucose, glucose, HYDROcodone-acetaminophen, hydrOXYzine HCL, LIDOcaine-prilocaine, melatonin, morphine, naloxone, ondansetron, promethazine (PHENERGAN) IVPB, sodium chloride 0.9%, sodium chloride 0.9%    Review of patient's allergies indicates:   Allergen Reactions    Ciprofloxacin Other (See Comments)     Elevated liver enzymes      Pcn [penicillins] Anaphylaxis     TOLERATES ROCEPHIN WITHOUT DIFFICULTY       Review of Systems: As per interval history    OBJECTIVE:     Vital Signs (Most Recent)  Temp: 98.2 °F (36.8 °C) (09/26/22 1540)  Pulse: 60 (09/26/22 1715)  Resp: 20 (09/26/22 1540)  BP: 125/63 (09/26/22 1715)  SpO2: 99 % (09/26/22 1540)    Vital Signs Range (Last 24H):  Temp:  [97.9 °F (36.6 °C)-98.5 °F (36.9 °C)]   Pulse:  [60-95]   Resp:  [16-20]   BP: ()/(51-70)   SpO2:  [97 %-100 %]     I & O (Last 24H):  Intake/Output Summary (Last 24 hours) at 9/26/2022 1722  Last data filed at 9/26/2022 1600  Gross per 24 hour   Intake 390 ml   Output 1600 ml   Net -1210 ml         Physical Exam:    Vitals and nursing note reviewed.     Constitutional:       General: Not in acute distress.     Appearance: Well-developed.   HENT:      Head: Normocephalic and atraumatic.   Eyes:      Pupils: Pupils are equal, round, and reactive to light.   Cardiovascular:      Rate and Rhythm: Regular rhythm.   Pulmonary:      Effort: Pulmonary effort is normal.      Breath sounds: Normal breath sounds. No wheezing.   Abdominal:      General: There is no distension.      Palpations: Abdomen is soft.      Tenderness: There is no abdominal tenderness. There is no guarding or rebound.   Ostomy  Musculoskeletal:         General: Normal range of motion.      Cervical back: Normal range of motion.   Skin:     Findings: No rash.   Neurological:      Mental Status: Alert and oriented to  person, place, and time.      Cranial Nerves: No cranial nerve deficit.      Sensory: No sensory deficit.   Nephrostomy tube    Laboratory:  I have reviewed all pertinent lab results within the past 24 hours.  CBC:   Recent Labs   Lab 09/26/22  0837   WBC 7.14   RBC 2.67*   HGB 9.0*   HCT 30.0*      *   MCH 33.7*   MCHC 30.0*       CMP:   Recent Labs   Lab 09/26/22  0837   GLU 93   CALCIUM 8.3*   ALBUMIN 2.2*   PROT 5.7*      K 3.8   CO2 23      BUN 26*   CREATININE 6.7*   ALKPHOS 469*   ALT 13   AST 14   BILITOT 0.5       Microbiology Results (last 7 days)       Procedure Component Value Units Date/Time    Urine culture [[id number]] Collected: 09/26/22 1323    Order Status: No result Specimen: Urine Updated: 09/26/22 1419    Urine Culture High Risk [[id number]]  (Abnormal) Collected: 09/25/22 1138    Order Status: Completed Specimen: Urine, Clean Catch Updated: 09/26/22 0712     Urine Culture, Routine ENTEROCOCCUS SPECIES  >100,000 cfu/ml  Identification and susceptibility pending      Narrative:      Indicated criteria for high risk culture:->Prior to urologic  procedures    Urine culture [[id number]] Collected: 09/25/22 1558    Order Status: Canceled Specimen: Urine             Diagnostic Results:      ASSESSMENT/PLAN:         Active Hospital Problems    Diagnosis  POA    *Fall [W19.XXXA]  Yes    ESRD on dialysis MWF [N18.6, Z99.2]  Not Applicable     Chronic    Hypothyroidism [E03.9]  Yes     Chronic      Resolved Hospital Problems   No resolved problems to display.         Plan:     -It was previously thought that the right abdomen tube was a nephrostomy tube, but on closer inspection and close review of the chart, I suspect this is a JOHN drain from her abdominal surgery that was left in because of continued serous drainage and then she was readmitted almost immediately of being discharged and never made the post surgical follow up to have it pulled. I consulted surgery to evaluate,  clarify and possibly pull this if it is a JOHN drain. Per note today, it is a peritoneal drain and they will be by to pull it.  -Repeating cath UA to get an accurate sample.  On po Doxy.    -Alk phosph elevated on today's labs more so than usual. Abdominal exam is benign. Repeating labs tomorrow.  -Fall suspected to be related to hypotension. Anti hypertensives held. Midodrine started. Stable.  -Nephrology following for routine HD  -PT/OT  -CM consulted for SNF. We received insurance authorization today and the facility can take her tomorrow.  If no new issues and labs/cultures are reassuring, will plan to discharge to SNF tomorrow on abx for UTI.  Will need to follow up cultures and review am cmp.      VTE Risk Mitigation (From admission, onward)      None              Department Hospital Medicine  [facility]  Brittaney Hooker MD  Date of service: 09/26/2022

## 2022-09-26 NOTE — PROGRESS NOTES
INPATIENT NEPHROLOGY Progress Note  [facility]    Patient Name: Shara Hutchins  Date: 09/26/2022    Reason for consultation: ESRD    Chief Complaint:   Chief Complaint   Patient presents with    Fall       History of Present Illness:  72-year-old ESRD on HD MWF and history of multiple abdominal surgeries for ischemic colitis who was recently discharged with ischemic colitis s/p surgery on 9/8, readmitted with gen weakness, abd pain and fall, no exac/reliev factors, consulted for dialysis.     Interval History:  9/16- I think she is dry- hold BP meds- UF even  9/17- awaiting SNF  9/18- BP remains low, pain meds helping, leakage around NT noted along with urology note, feels weak  9/19  overall feels better.  The drain is bothering her.  No sob or cp.  Has intermittent nausea  9/20 doing alright today.  No chest pain.  Eating a little better.  It doesn't help that they bring her the same meal every time.    9/21  feels good today.  No chest pain or sob.  In good spirits   9/22 nauseated this am.  No chest pain or sob.  Upset that they keep bringing her meals that are ice cold.  I can't say that I blame her.  That's gross  9/23  afvss.  No distress  9/24  AFVSS.  No acute events.  Waiting on placement  9/25 c/o of dysuria.  No vomiting today. Had episodes of nausea with vomiting yesterday  9/26 VSS. HD today. Urine cx from 9/25 + Enterococcus    Plan of Care:    ESRD on HD MWF  Hypotension, ? UTI  Baseline HTN  Secondary HPT  Anemia of CKD    Plan:    - continue HD MWF  - hold home BP meds- gentle UF- okay with fluid challenge if SBP drops to < 90 and symptomatic/orthostatic- be judicious with pain meds  - adjust dialysate as needed  - continue phos binder (home supply)  - increased epogen to 10k with dialysis  - started low dose midodrine for hypotension  - treat any infection, check TSH     Thank you for allowing us to participate in this patient's care. We will continue to follow.    Vital  Signs:  Temp Readings from Last 3 Encounters:   09/26/22 98.5 °F (36.9 °C) (Oral)   09/14/22 98 °F (36.7 °C) (Oral)   08/30/22 98.8 °F (37.1 °C) (Skin)       Pulse Readings from Last 3 Encounters:   09/26/22 70   09/14/22 79   08/30/22 (P) 71       BP Readings from Last 3 Encounters:   09/26/22 121/63   09/14/22 112/68   08/30/22 (!) (P) 125/58       Weight:  Wt Readings from Last 3 Encounters:   09/15/22 70.8 kg (156 lb)   09/08/22 70.9 kg (156 lb 3.2 oz)   08/30/22 69.4 kg (153 lb)     Medications:  Scheduled Meds:   doxycycline  100 mg Oral Q12H    epoetin nica-ebpx (RETACRIT) injection  10,000 Units Intravenous Every Mon, Wed, Fri    levothyroxine  88 mcg Oral Before breakfast    midodrine  2.5 mg Oral TID    simethicone  1 tablet Oral TID PC     Continuous Infusions:  PRN Meds:.acetaminophen, acetaminophen, aluminum-magnesium hydroxide-simethicone, dextrose 10%, dextrose 10%, glucagon (human recombinant), glucose, glucose, HYDROcodone-acetaminophen, hydrOXYzine HCL, LIDOcaine-prilocaine, melatonin, morphine, naloxone, ondansetron, promethazine (PHENERGAN) IVPB, sodium chloride 0.9%, sodium chloride 0.9%  Current Facility-Administered Medications on File Prior to Encounter   Medication Dose Route Frequency Provider Last Rate Last Admin    electrolyte-S (ISOLYTE)   Intravenous Continuous Raz Ruff MD        lactated ringers infusion   Intravenous Continuous Ruy Beltre MD         Current Outpatient Medications on File Prior to Encounter   Medication Sig Dispense Refill    cholecalciferol, vitamin D3, (VITAMIN D3) 25 mcg (1,000 unit) capsule Take 1 capsule (1,000 Units total) by mouth once daily. 10 capsule 0    diltiaZEM (CARDIZEM CD) 120 MG Cp24 Take 1 capsule (120 mg total) by mouth once daily. 90 capsule 3    ferric citrate (AURYXIA) 210 mg iron Tab Take 2 tablets by mouth 3 (three) times daily with meals.      gabapentin (NEURONTIN) 100 MG capsule Take 100 mg by mouth 2 (two) times daily. HCS       levothyroxine (SYNTHROID) 88 MCG tablet TAKE 1 TABLET BEFORE BREAKFAST (Patient taking differently: Take 88 mcg by mouth before breakfast. TAKE 1 TABLET BEFORE BREAKFAST) 90 tablet 3    LORazepam (ATIVAN) 0.5 MG tablet Take 1 tablet (0.5 mg total) by mouth every evening. 90 tablet 1    oxyCODONE (ROXICODONE) 5 MG immediate release tablet Take 1 tablet (5 mg total) by mouth every 4 (four) hours as needed for Pain. 20 tablet 0    promethazine (PHENERGAN) 25 MG tablet TAKE 1 TABLET (25 MG TOTAL) BY MOUTH 2 (TWO) TIMES DAILY AS NEEDED FOR NAUSEA. 60 tablet 0    rOPINIRole (REQUIP) 0.5 MG tablet Take 0.5 mg by mouth every 8 (eight) hours.      sertraline (ZOLOFT) 50 MG tablet Take 1 tablet (50 mg total) by mouth every evening. Take 50 mg by mouth every evening. 90 tablet 1    traMADoL (ULTRAM) 50 mg tablet Take 1 tablet (50 mg total) by mouth every 6 (six) hours as needed. 30 tablet 0    zinc gluconate 50 mg tablet Take 50 mg by mouth once daily.      hydrALAZINE (APRESOLINE) 50 MG tablet Take 1 tablet (50 mg total) by mouth every 8 (eight) hours. (Patient not taking: Reported on 9/15/2022) 270 tablet 0    [DISCONTINUED] miconazole (MICOTIN) 2 % cream Apply topically 2 (two) times daily. 28 g 0     Review of Systems:  Neg    Physical Exam:  Constitutional: nad, aao x 3  Heart: rrr, no m/r/g, wwp, no edema  Lungs: ctab, no w/r/r/c, no lb  Abdomen: s/nt/nd, +BS, ostomy    Results:  Recent Labs   Lab 09/20/22  0527 09/22/22  0914 09/26/22  0837    139 141   K 4.2 4.3 3.8    102 109   CO2 25 29 23   BUN 21 16 26*   CREATININE 4.6* 3.9* 6.7*   GLU 80 103 93       Recent Labs   Lab 09/20/22  0527 09/22/22  0914 09/26/22  0837   CALCIUM 8.0* 8.3* 8.3*   ALBUMIN  --  2.0* 2.2*   PHOS  --  3.2  --    MG 2.0  --   --        Recent Labs   Lab 09/30/19  0820 11/15/19  0748   PTH, Intact 176.4 H 238.4 H       No results for input(s): POCTGLUCOSE in the last 168 hours.    Recent Labs   Lab 11/15/19  0748 01/22/20  0848  05/25/21  0847   Hemoglobin A1C Invalid A SEE COMMENT 4.6       Recent Labs   Lab 09/20/22  0528 09/22/22  0914 09/26/22  0837   WBC 11.14 8.85 7.14   HGB 8.3* 8.7* 9.0*   HCT 27.6* 28.3* 30.0*    284 300   * 110* 112*   MCHC 30.1* 30.7* 30.0*   MONO 7.2  0.8 6.3  0.6  --        Recent Labs   Lab 09/22/22  0914 09/26/22  0837   BILITOT  --  0.5   PROT  --  5.7*   ALBUMIN 2.0* 2.2*   ALKPHOS  --  469*   ALT  --  13   AST  --  14       Recent Labs   Lab 06/21/22  1306 06/22/22  1550 09/25/22  1558   Color, UA Yellow Yellow Yellow   Appearance, UA Cloudy A Cloudy A Cloudy A   pH, UA 7.0 7.0 7.0   Specific [place], UA 1.010 1.010 1.020   Protein, UA 2+ A 2+ A 2+ A   Glucose, UA Negative Negative Negative   Ketones, UA Negative Negative Negative   Urobilinogen, UA Negative Negative Negative   Bilirubin (UA) Negative Negative Negative   Occult Blood UA 2+ A 3+ A 3+ A   Nitrite, UA Negative Negative Negative   RBC, UA 22 H >100 H SEE COMMENT   WBC, UA >100 H >100 H >100 H   Bacteria Many A Negative SEE COMMENT   Hyaline Casts, UA 10 A 444 A SEE COMMENT A       Recent Labs   Lab 12/08/20 2024 06/22/22  1526 09/07/22  1022 09/07/22  1643   POC PH 7.370 7.324 L  --  7.380   POC PCO2 39.0 53.8 H  --  41.4   POC HCO3 22.5 L 28.0  --  24.5   POC PO2 85 40  --  64 HH   POC SATURATED O2 96 69 L  --  92 L   POC BE -3 2  --  -1   Sample ARTERIAL VENOUS VENOUS VENOUS       Microbiology Results (last 7 days)       Procedure Component Value Units Date/Time    Urine Culture High Risk [[id number]]  (Abnormal) Collected: 09/25/22 1558    Order Status: Completed Specimen: Urine, Clean Catch Updated: 09/26/22 0712     Urine Culture, Routine ENTEROCOCCUS SPECIES  >100,000 cfu/ml  Identification and susceptibility pending      Narrative:      Indicated criteria for high risk culture:->Prior to urologic  procedures    Urine culture [[id number]] Collected: 09/25/22 1555    Order Status: Canceled Specimen: Urine             I have  spent >  minutes providing care for this patient for the above diagnoses. These services have included chart/data/imaging review, evaluation, exam, formulation of plan, , note preparation, and discussions with staff involved in this patient's care.    Jorge Webb MD  Nephrology  [facility] [place]  [phone] n

## 2022-09-26 NOTE — CONSULTS
Known to me   Drain is peritoneal and should be removed  Will come by and remove during this admission

## 2022-09-26 NOTE — PT/OT/SLP PROGRESS
"Occupational Therapy      Patient Name:  Shara Hutchins   MRN:  [id number]    Patient not seen today secondary to Other (Comment) (declined due multiple "procedures about to happen" (per pt, lee being put in, drain being removed, and dialysis)). Will follow-up next service date.    9/26/2022  "

## 2022-09-26 NOTE — PLAN OF CARE
Problem: Adult Inpatient Plan of Care  Goal: Plan of Care Review  Outcome: Ongoing, Progressing  Goal: Optimal Comfort and Wellbeing  Outcome: Ongoing, Progressing     Problem: Glycemic Control Impaired (Sepsis/Septic Shock)  Goal: Blood Glucose Level Within Desired Range  Outcome: Ongoing, Progressing     Problem: Nutrition Impaired (Sepsis/Septic Shock)  Goal: Optimal Nutrition Intake  Outcome: Ongoing, Progressing     Problem: Fluid and Electrolyte Imbalance (Acute Kidney Injury/Impairment)  Goal: Fluid and Electrolyte Balance  Outcome: Ongoing, Progressing     Problem: Oral Intake Inadequate (Acute Kidney Injury/Impairment)  Goal: Optimal Nutrition Intake  Outcome: Ongoing, Progressing     Problem: Fluid Imbalance (Pneumonia)  Goal: Fluid Balance  Outcome: Ongoing, Progressing     Problem: Infection (Pneumonia)  Goal: Resolution of Infection Signs and Symptoms  Outcome: Ongoing, Progressing     Problem: Infection (Hemodialysis)  Goal: Absence of Infection Signs and Symptoms  Outcome: Ongoing, Progressing     Problem: Fall Injury Risk  Goal: Absence of Fall and Fall-Related Injury  Outcome: Ongoing, Progressing     Problem: Infection  Goal: Absence of Infection Signs and Symptoms  Outcome: Ongoing, Progressing

## 2022-09-26 NOTE — PROGRESS NOTES
[facility]  Adult Nutrition   Progress Note (Follow-Up)    SUMMARY      Recommendations:   1. Continue current diet as tolerated.   2. Continue Glucerna BID.  3. RD to monitor labs and adjust diet accordingly.      Goals:   Goals: 1. Patient to consume >/= 75% estimated energy and protein needs. 2. Lab values trend to target range. Progressing    Dietitian Rounds Brief  Patient eating most of meals. Drinks supplement. Plan for d/c to SNF today or tomorrow.    Diet order: Regular diet    Oral Supplement: Glucerna BID    % Intake of Estimated Energy Needs: 75 - 100 %  % Meal Intake: 75 - 100 %    Estimated/Assessed Needs  Weight Used For Calorie Calculations: 70.8 kg (156 lb 1.4 oz)      Weight Used For Protein Calculations: 70.8 kg (156 lb 1.4 oz)  Fluid Requirements (mL): 24 hour UOP + 1000 mL     Weight History:  Wt Readings from Last 5 Encounters:   09/15/22 70.8 kg (156 lb)   09/08/22 70.9 kg (156 lb 3.2 oz)   08/30/22 69.4 kg (153 lb)   08/27/22 65.8 kg (145 lb)   08/22/22 67.6 kg (149 lb)      Reason for Assessment  Reason For Assessment: length of stay  Diagnosis: other (see comments) (syncope)  Relevant Medical History: ESRD, HTN, hypothyroidism, anemia, vitamin D deficiency, vulvar cancer, severe malnutrition  Interdisciplinary Rounds: attended    Medications:Pertinent Medications Reviewed  Scheduled Meds:   epoetin nica-ebpx (RETACRIT) injection  10,000 Units Intravenous Every Mon, Wed, Fri    levothyroxine  88 mcg Oral Before breakfast    midodrine  2.5 mg Oral TID    simethicone  1 tablet Oral TID PC     Continuous Infusions:  PRN Meds:.acetaminophen, acetaminophen, aluminum-magnesium hydroxide-simethicone, dextrose 10%, dextrose 10%, glucagon (human recombinant), glucose, glucose, HYDROcodone-acetaminophen, hydrOXYzine HCL, LIDOcaine-prilocaine, melatonin, morphine, naloxone, ondansetron, promethazine (PHENERGAN) IVPB, sodium chloride 0.9%, sodium chloride 0.9%    Labs: Pertinent Labs  Reviewed  Clinical Chemistry:  Recent Labs   Lab 09/20/22  0527 09/22/22  0914 09/26/22  0837    139 141   K 4.2 4.3 3.8    102 109   CO2 25 29 23   GLU 80 103 93   BUN 21 16 26*   CREATININE 4.6* 3.9* 6.7*   CALCIUM 8.0* 8.3* 8.3*   PROT  --   --  5.7*   ALBUMIN  --  2.0* 2.2*   BILITOT  --   --  0.5   ALKPHOS  --   --  469*   AST  --   --  14   ALT  --   --  13   ANIONGAP 2* 8 9   MG 2.0  --   --    PHOS  --  3.2  --     < > = values in this interval not displayed.     CBC:   Recent Labs   Lab 09/26/22  0837   WBC 7.14   RBC 2.67*   HGB 9.0*   HCT 30.0*      *   MCH 33.7*   MCHC 30.0*     Monitor and Evaluation  Food and Nutrient Intake: energy intake, food and beverage intake  Food and Nutrient Adminstration: diet order  Knowledge/Beliefs/Attitudes: food and nutrition knowledge/skill  Physical Activity and Function: nutrition-related ADLs and IADLs  Anthropometric Measurements: weight, weight change, body mass index  Biochemical Data, Medical Tests and Procedures: electrolyte and renal panel, inflammatory profile, gastrointestinal profile, lipid profile, glucose/endocrine profile  Nutrition-Focused Physical Findings: overall appearance     Nutrition Risk  Level of Risk/Frequency of Follow-up: moderate     Nutrition Follow-Up  RD Follow-up?: Yes    Nancy Ingram RD 09/26/2022 3:28 PM

## 2022-09-26 NOTE — PT/OT/SLP PROGRESS
Physical Therapy      Patient Name:  Shara Hutchins   MRN:  [id number]    Patient not seen today secondary to Patient unwilling to participate, Other (Comment) (pt reported she will be transferred to SNF tomorrow and having some tests today.). Will follow-up 9/27/22.

## 2022-09-26 NOTE — PLAN OF CARE
09/26/22 1027   Discharge Reassessment   Assessment Type Discharge Planning Reassessment   Did the patient's condition or plan change since previous assessment? Yes   Discharge Plan discussed with: Patient   Communicated RODRIGO with patient/caregiver Yes   Discharge Plan A Skilled Nursing Facility   Discharge Plan B Skilled Nursing Facility   DME Needed Upon Discharge  none   Discharge Barriers Identified None   Post-Acute Status   Post-Acute Authorization Placement   Post-Acute Placement Status Pending medical clearance/testing   [facility]   Discharge Delays (!) Post-Acute Set-up     [facility] [phone] patient has been accepted at the [facility] in [place].  brought acceptance to patient who agreed to go to facility. Received email from Gilberto with instructions on what is need. Pt can be admitted to their facility tomorrow 9/27. Informed Dr Hooker of this. Covid and chest xray ordered. Received call from Ariella leung  at [facility] [phone] who gave list of items that need to be faxed tomorrow am which include discharge orders, covid result, progress note, and chest xray the fax number is [phone]. [place] will be admitted to [place] [address]. Call report to [phone] ext. [phone].     Ariella Leung called and asked for updated progress note from today 9/26 and medication list. No update note from Dr Hooker.

## 2022-09-26 NOTE — PLAN OF CARE
Problem: Skin Injury Risk Increased  Goal: Skin Health and Integrity  Intervention: Promote and Optimize Oral Intake  Flowsheets (Taken 9/26/2022 1533)  Oral Nutrition Promotion: calorie-dense liquids provided     Problem: Oral Intake Inadequate  Goal: Improved Oral Intake  Outcome: Met  Intervention: Promote and Optimize Oral Intake  Flowsheets (Taken 9/26/2022 1533)  Oral Nutrition Promotion: calorie-dense liquids provided

## 2022-09-27 NOTE — PLAN OF CARE
Problem: Adult Inpatient Plan of Care  Goal: Plan of Care Review  Outcome: Ongoing, Progressing  Goal: Optimal Comfort and Wellbeing  Outcome: Ongoing, Progressing  Goal: Readiness for Transition of Care  Outcome: Ongoing, Progressing     Problem: Infection Progression (Sepsis/Septic Shock)  Goal: Absence of Infection Signs and Symptoms  Outcome: Ongoing, Progressing     Problem: Nutrition Impaired (Sepsis/Septic Shock)  Goal: Optimal Nutrition Intake  Outcome: Ongoing, Progressing

## 2022-09-27 NOTE — PLAN OF CARE
Discharge orders and chart reviewed. No other discharge needs noted at this time. Pt is clear for discharge from case management. Pt is discharging to [facility].     will be admitted to [place] [address]. Call report to [phone] ext. [phone] or [phone].     Ariella to set up transport after report is called.     Primary nurse notified of above via secure chat.        09/27/22 0925   Final Note   Assessment Type Final Discharge Note   Anticipated Discharge Disposition SNF   What phone number can be called within the next 1-3 days to see how you are doing after discharge? [phone]   Hospital Resources/Appts/Education Provided Provided patient/caregiver with written discharge plan information;Appointments scheduled and added to AVS   Post-Acute Status   Post-Acute Authorization Placement   Post-Acute Placement Status Set-up Complete/Auth obtained

## 2022-09-27 NOTE — PROGRESS NOTES
[facility]  General Surgery  Progress Note    Subjective:     History of Present Illness:  No notes on file    Post-Op Info:  * No surgery found *         Interval History: doing well  Some loose, some formed stool in bag  Drain leaks little    Medications:  Continuous Infusions:  Scheduled Meds:   doxycycline  100 mg Oral Q12H    epoetin nica-ebpx (RETACRIT) injection  10,000 Units Intravenous Every Mon, Wed, Fri    levothyroxine  88 mcg Oral Before breakfast    midodrine  2.5 mg Oral TID    simethicone  1 tablet Oral TID PC     PRN Meds:acetaminophen, acetaminophen, aluminum-magnesium hydroxide-simethicone, dextrose 10%, dextrose 10%, glucagon (human recombinant), glucose, glucose, HYDROcodone-acetaminophen, hydrOXYzine HCL, LIDOcaine-prilocaine, melatonin, morphine, naloxone, ondansetron, promethazine (PHENERGAN) IVPB, sodium chloride 0.9%, sodium chloride 0.9%     Review of patient's allergies indicates:   Allergen Reactions    Ciprofloxacin Other (See Comments)     Elevated liver enzymes      Pcn [penicillins] Anaphylaxis     TOLERATES ROCEPHIN WITHOUT DIFFICULTY     Objective:     Vital Signs (Most Recent):  Temp: 97.4 °F (36.3 °C) (09/27/22 0847)  Pulse: 74 (09/27/22 0847)  Resp: 18 (09/27/22 0847)  BP: 101/61 (09/27/22 0847)  SpO2: 99 % (09/27/22 0847) Vital Signs (24h Range):  Temp:  [97.4 °F (36.3 °C)-98.5 °F (36.9 °C)] 97.4 °F (36.3 °C)  Pulse:  [60-95] 74  Resp:  [16-20] 18  SpO2:  [73 %-100 %] 99 %  BP: ()/(56-76) 101/61     Weight: 70.8 kg (156 lb)  Body mass index is 24.43 kg/m².    Intake/Output - Last 3 Shifts         09/25 0700 09/26 0659 09/26 0700 09/27 0659 09/27 0700 09/28 0659    P.O. 480 360     Other  500     IV Piggyback  150     Total Intake(mL/kg) 480 (6.8) 1010 (14.3)     Urine (mL/kg/hr) 100 (0.1)      Other  1374     Stool 1100 800     Total Output 1200 2174     Net -720 -1164            Stool Occurrence 2 x              Physical Exam  Abdominal:       General: Abdomen is flat. There is no distension.      Palpations: Abdomen is soft.      Tenderness: There is no abdominal tenderness.      Comments: Drain with serous fluid  Small amount       Significant Labs:  I have reviewed all pertinent lab results within the past 24 hours.  CBC:   Recent Labs   Lab 09/26/22  0837   WBC 7.14   RBC 2.67*   HGB 9.0*   HCT 30.0*      *   MCH 33.7*   MCHC 30.0*     BMP:   Recent Labs   Lab 09/27/22  0654   GLU 83      K 3.7      CO2 22*   BUN 13   CREATININE 4.2*   CALCIUM 8.4*       Significant Diagnostics:  I have reviewed all pertinent imaging results/findings within the past 24 hours.    Assessment/Plan:     Ischemic colitis s/p laprotomy with ischemic sigmoid colon, sigmoid rectum stricture  Drain removed at bedside without complication  Regular diet  Nursing staff to removal staples  Follow up with me in a few weeks to talk about reversal possibilities         Brigette Chavez MD  General Surgery  [facility]

## 2022-09-27 NOTE — ASSESSMENT & PLAN NOTE
Drain removed at bedside without complication  Regular diet  Nursing staff to removal staples  Follow up with me in a few weeks to talk about reversal possibilities

## 2022-09-27 NOTE — PT/OT/SLP PROGRESS
Physical Therapy      Patient Name:  Shara Hutchins   MRN:  [id number]    Patient not seen today secondary to Bowel/bladder accident (colostomy bag burst, waiting for change). Will follow-up .

## 2022-09-27 NOTE — PLAN OF CARE
Problem: Adult Inpatient Plan of Care  Goal: Plan of Care Review  Outcome: Met  Goal: Patient-Specific Goal (Individualized)  Outcome: Met  Goal: Absence of Hospital-Acquired Illness or Injury  Outcome: Met  Goal: Optimal Comfort and Wellbeing  Outcome: Met  Goal: Readiness for Transition of Care  Outcome: Met     Problem: Adjustment to Illness (Sepsis/Septic Shock)  Goal: Optimal Coping  Outcome: Met     Problem: Bleeding (Sepsis/Septic Shock)  Goal: Absence of Bleeding  Outcome: Met     Problem: Glycemic Control Impaired (Sepsis/Septic Shock)  Goal: Blood Glucose Level Within Desired Range  Outcome: Met     Problem: Infection Progression (Sepsis/Septic Shock)  Goal: Absence of Infection Signs and Symptoms  Outcome: Met     Problem: Nutrition Impaired (Sepsis/Septic Shock)  Goal: Optimal Nutrition Intake  Outcome: Met     Problem: Fluid and Electrolyte Imbalance (Acute Kidney Injury/Impairment)  Goal: Fluid and Electrolyte Balance  Outcome: Met     Problem: Oral Intake Inadequate (Acute Kidney Injury/Impairment)  Goal: Optimal Nutrition Intake  Outcome: Met     Problem: Renal Function Impairment (Acute Kidney Injury/Impairment)  Goal: Effective Renal Function  Outcome: Met     Problem: Fluid Imbalance (Pneumonia)  Goal: Fluid Balance  Outcome: Met     Problem: Infection (Pneumonia)  Goal: Resolution of Infection Signs and Symptoms  Outcome: Met     Problem: Respiratory Compromise (Pneumonia)  Goal: Effective Oxygenation and Ventilation  Outcome: Met     Problem: Impaired Wound Healing  Goal: Optimal Wound Healing  Outcome: Met     Problem: Skin Injury Risk Increased  Goal: Skin Health and Integrity  Outcome: Met     Problem: Device-Related Complication Risk (Hemodialysis)  Goal: Safe, Effective Therapy Delivery  Outcome: Met     Problem: Hemodynamic Instability (Hemodialysis)  Goal: Effective Tissue Perfusion  Outcome: Met     Problem: Infection (Hemodialysis)  Goal: Absence of Infection Signs and  Symptoms  Outcome: Met     Problem: Fall Injury Risk  Goal: Absence of Fall and Fall-Related Injury  Outcome: Met     Problem: Infection  Goal: Absence of Infection Signs and Symptoms  Outcome: Met

## 2022-09-27 NOTE — PROGRESS NOTES
INPATIENT NEPHROLOGY Progress Note  [facility]    Patient Name: Shara Hutchins  Date: 09/27/2022    Reason for consultation: ESRD    Chief Complaint:   Chief Complaint   Patient presents with    Fall       History of Present Illness:  72-year-old ESRD on HD MWF and history of multiple abdominal surgeries for ischemic colitis who was recently discharged with ischemic colitis s/p surgery on 9/8, readmitted with gen weakness, abd pain and fall, no exac/reliev factors, consulted for dialysis.     Interval History:  9/16- I think she is dry- hold BP meds- UF even  9/17- awaiting SNF  9/18- BP remains low, pain meds helping, leakage around NT noted along with urology note, feels weak  9/19  overall feels better.  The drain is bothering her.  No sob or cp.  Has intermittent nausea  9/20 doing alright today.  No chest pain.  Eating a little better.  It doesn't help that they bring her the same meal every time.    9/21  feels good today.  No chest pain or sob.  In good spirits   9/22 nauseated this am.  No chest pain or sob.  Upset that they keep bringing her meals that are ice cold.  I can't say that I blame her.  That's gross  9/23  afvss.  No distress  9/24  AFVSS.  No acute events.  Waiting on placement  9/25 c/o of dysuria.  No vomiting today. Had episodes of nausea with vomiting yesterday  9/26 VSS. HD today. Urine cx from 9/25 + Enterococcus.  9/27 VSS, no new complains. HD in AM.    Plan of Care:    ESRD on HD MWF  Hypotension, ? UTI  Baseline HTN  Secondary HPT  Anemia of CKD    Plan:    - continue HD MWF  - hold home BP meds- gentle UF- okay with fluid challenge if SBP drops to < 90 and symptomatic/orthostatic- be judicious with pain meds  - adjust dialysate as needed  - continue phos binder (home supply)  - increased epogen to 10k with dialysis  - started low dose midodrine for hypotension  - treat any infection, TSH is OK     Thank you for allowing us to participate in this patient's care. We  will continue to follow.    Vital Signs:  Temp Readings from Last 3 Encounters:   09/27/22 97.4 °F (36.3 °C)   09/14/22 98 °F (36.7 °C) (Oral)   08/30/22 98.8 °F (37.1 °C) (Skin)       Pulse Readings from Last 3 Encounters:   09/27/22 74   09/14/22 79   08/30/22 (P) 71       BP Readings from Last 3 Encounters:   09/27/22 101/61   09/14/22 112/68   08/30/22 (!) (P) 125/58       Weight:  Wt Readings from Last 3 Encounters:   09/15/22 70.8 kg (156 lb)   09/08/22 70.9 kg (156 lb 3.2 oz)   08/30/22 69.4 kg (153 lb)     Medications:  Scheduled Meds:   doxycycline  100 mg Oral Q12H    epoetin nica-ebpx (RETACRIT) injection  10,000 Units Intravenous Every Mon, Wed, Fri    levothyroxine  88 mcg Oral Before breakfast    midodrine  2.5 mg Oral TID    simethicone  1 tablet Oral TID PC     Continuous Infusions:  PRN Meds:.acetaminophen, acetaminophen, aluminum-magnesium hydroxide-simethicone, dextrose 10%, dextrose 10%, glucagon (human recombinant), glucose, glucose, HYDROcodone-acetaminophen, hydrOXYzine HCL, LIDOcaine-prilocaine, melatonin, morphine, naloxone, ondansetron, promethazine (PHENERGAN) IVPB, sodium chloride 0.9%, sodium chloride 0.9%  Current Facility-Administered Medications on File Prior to Encounter   Medication Dose Route Frequency Provider Last Rate Last Admin    electrolyte-S (ISOLYTE)   Intravenous Continuous Raz Ruff MD        lactated ringers infusion   Intravenous Continuous Ruy Beltre MD         Current Outpatient Medications on File Prior to Encounter   Medication Sig Dispense Refill    cholecalciferol, vitamin D3, (VITAMIN D3) 25 mcg (1,000 unit) capsule Take 1 capsule (1,000 Units total) by mouth once daily. 10 capsule 0    diltiaZEM (CARDIZEM CD) 120 MG Cp24 Take 1 capsule (120 mg total) by mouth once daily. 90 capsule 3    ferric citrate (AURYXIA) 210 mg iron Tab Take 2 tablets by mouth 3 (three) times daily with meals.      gabapentin (NEURONTIN) 100 MG capsule Take 100 mg by mouth 2  (two) times daily. HCS      levothyroxine (SYNTHROID) 88 MCG tablet TAKE 1 TABLET BEFORE BREAKFAST (Patient taking differently: Take 88 mcg by mouth before breakfast. TAKE 1 TABLET BEFORE BREAKFAST) 90 tablet 3    LORazepam (ATIVAN) 0.5 MG tablet Take 1 tablet (0.5 mg total) by mouth every evening. 90 tablet 1    oxyCODONE (ROXICODONE) 5 MG immediate release tablet Take 1 tablet (5 mg total) by mouth every 4 (four) hours as needed for Pain. 20 tablet 0    promethazine (PHENERGAN) 25 MG tablet TAKE 1 TABLET (25 MG TOTAL) BY MOUTH 2 (TWO) TIMES DAILY AS NEEDED FOR NAUSEA. 60 tablet 0    rOPINIRole (REQUIP) 0.5 MG tablet Take 0.5 mg by mouth every 8 (eight) hours.      sertraline (ZOLOFT) 50 MG tablet Take 1 tablet (50 mg total) by mouth every evening. Take 50 mg by mouth every evening. 90 tablet 1    traMADoL (ULTRAM) 50 mg tablet Take 1 tablet (50 mg total) by mouth every 6 (six) hours as needed. 30 tablet 0    zinc gluconate 50 mg tablet Take 50 mg by mouth once daily.      hydrALAZINE (APRESOLINE) 50 MG tablet Take 1 tablet (50 mg total) by mouth every 8 (eight) hours. (Patient not taking: Reported on 9/15/2022) 270 tablet 0    [DISCONTINUED] miconazole (MICOTIN) 2 % cream Apply topically 2 (two) times daily. 28 g 0     Review of Systems:  Neg    Physical Exam:  Constitutional: nad, aao x 3  Heart: rrr, no m/r/g, wwp, no edema  Lungs: ctab, no w/r/r/c, no lb  Abdomen: s/nt/nd, +BS, ostomy    Results:  Recent Labs   Lab 09/22/22  0914 09/26/22  0837 09/27/22  0654    141 140   K 4.3 3.8 3.7    109 109   CO2 29 23 22*   BUN 16 26* 13   CREATININE 3.9* 6.7* 4.2*    93 83         Recent Labs   Lab 09/22/22  0914 09/26/22  0837 09/27/22  0654   CALCIUM 8.3* 8.3* 8.4*   ALBUMIN 2.0* 2.2* 2.2*   PHOS 3.2  --   --          Recent Labs   Lab 09/30/19  0820 11/15/19  0748   PTH, Intact 176.4 H 238.4 H         No results for input(s): POCTGLUCOSE in the last 168 hours.    Recent Labs   Lab 11/15/19  0748  01/22/20  0848 05/25/21  0847   Hemoglobin A1C Invalid A SEE COMMENT 4.6         Recent Labs   Lab 09/22/22  0914 09/26/22  0837   WBC 8.85 7.14   HGB 8.7* 9.0*   HCT 28.3* 30.0*    300   * 112*   MCHC 30.7* 30.0*   MONO 6.3  0.6  --          Recent Labs   Lab 09/22/22  0914 09/26/22  0837 09/27/22  0654   BILITOT  --  0.5 0.8   PROT  --  5.7* 5.6*   ALBUMIN 2.0* 2.2* 2.2*   ALKPHOS  --  469* 408*   ALT  --  13 12   AST  --  14 15         Recent Labs   Lab 06/22/22  1550 09/25/22  1558 09/26/22  1323   Color, UA Yellow Yellow Yellow   Appearance, UA Cloudy A Cloudy A Clear   pH, UA 7.0 7.0 7.0   Specific [place], UA 1.010 1.020 1.015   Protein, UA 2+ A 2+ A 2+ A   Glucose, UA Negative Negative Negative   Ketones, UA Negative Negative Negative   Urobilinogen, UA Negative Negative Negative   Bilirubin (UA) Negative Negative Negative   Occult Blood UA 3+ A 3+ A 3+ A   Nitrite, UA Negative Negative Negative   RBC, UA >100 H SEE COMMENT 10 H   WBC, UA >100 H >100 H 75 H   Bacteria Negative SEE COMMENT None   Hyaline Casts,  A SEE COMMENT A 0         Recent Labs   Lab 12/08/20 2024 06/22/22  1526 09/07/22  1022 09/07/22  1643   POC PH 7.370 7.324 L  --  7.380   POC PCO2 39.0 53.8 H  --  41.4   POC HCO3 22.5 L 28.0  --  24.5   POC PO2 85 40  --  64 HH   POC SATURATED O2 96 69 L  --  92 L   POC BE -3 2  --  -1   Sample ARTERIAL VENOUS VENOUS VENOUS         Microbiology Results (last 7 days)       Procedure Component Value Units Date/Time    Urine culture [[id number]]  (Abnormal) Collected: 09/26/22 1323    Order Status: Completed Specimen: Urine Updated: 09/27/22 0646     Urine Culture, Routine ENTEROCOCCUS FAECALIS  > 100,000 cfu/ml  For susceptibility see order #[id number]      Narrative:      Specimen Source->Urine    Urine Culture High Risk [[id number]]  (Abnormal)  (Susceptibility) Collected: 09/25/22 1558    Order Status: Completed Specimen: Urine, Clean Catch Updated: 09/27/22 0630     Urine  Culture, Routine ENTEROCOCCUS FAECALIS  >100,000 cfu/ml      Narrative:      Indicated criteria for high risk culture:->Prior to urologic  procedures    Urine culture [[id number]] Collected: 09/25/22 7220    Order Status: Canceled Specimen: Urine             I have spent >  minutes providing care for this patient for the above diagnoses. These services have included chart/data/imaging review, evaluation, exam, formulation of plan, , note preparation, and discussions with staff involved in this patient's care.    Jorge Webb MD  Nephrology  [facility]  [phone] n

## 2022-09-27 NOTE — SUBJECTIVE & OBJECTIVE
Interval History: doing well  Some loose, some formed stool in bag  Drain leaks little    Medications:  Continuous Infusions:  Scheduled Meds:   doxycycline  100 mg Oral Q12H    epoetin nica-ebpx (RETACRIT) injection  10,000 Units Intravenous Every Mon, Wed, Fri    levothyroxine  88 mcg Oral Before breakfast    midodrine  2.5 mg Oral TID    simethicone  1 tablet Oral TID PC     PRN Meds:acetaminophen, acetaminophen, aluminum-magnesium hydroxide-simethicone, dextrose 10%, dextrose 10%, glucagon (human recombinant), glucose, glucose, HYDROcodone-acetaminophen, hydrOXYzine HCL, LIDOcaine-prilocaine, melatonin, morphine, naloxone, ondansetron, promethazine (PHENERGAN) IVPB, sodium chloride 0.9%, sodium chloride 0.9%     Review of patient's allergies indicates:   Allergen Reactions    Ciprofloxacin Other (See Comments)     Elevated liver enzymes      Pcn [penicillins] Anaphylaxis     TOLERATES ROCEPHIN WITHOUT DIFFICULTY     Objective:     Vital Signs (Most Recent):  Temp: 97.4 °F (36.3 °C) (09/27/22 0847)  Pulse: 74 (09/27/22 0847)  Resp: 18 (09/27/22 0847)  BP: 101/61 (09/27/22 0847)  SpO2: 99 % (09/27/22 0847) Vital Signs (24h Range):  Temp:  [97.4 °F (36.3 °C)-98.5 °F (36.9 °C)] 97.4 °F (36.3 °C)  Pulse:  [60-95] 74  Resp:  [16-20] 18  SpO2:  [73 %-100 %] 99 %  BP: ()/(56-76) 101/61     Weight: 70.8 kg (156 lb)  Body mass index is 24.43 kg/m².    Intake/Output - Last 3 Shifts         09/25 0700 09/26 0659 09/26 0700 09/27 0659 09/27 0700 09/28 0659    P.O. 480 360     Other  500     IV Piggyback  150     Total Intake(mL/kg) 480 (6.8) 1010 (14.3)     Urine (mL/kg/hr) 100 (0.1)      Other  1374     Stool 1100 800     Total Output 1200 2174     Net -720 -1164            Stool Occurrence 2 x              Physical Exam  Abdominal:      General: Abdomen is flat. There is no distension.      Palpations: Abdomen is soft.      Tenderness: There is no abdominal tenderness.      Comments: Drain with serous fluid  Small  amount       Significant Labs:  I have reviewed all pertinent lab results within the past 24 hours.  CBC:   Recent Labs   Lab 09/26/22  0837   WBC 7.14   RBC 2.67*   HGB 9.0*   HCT 30.0*      *   MCH 33.7*   MCHC 30.0*     BMP:   Recent Labs   Lab 09/27/22  0654   GLU 83      K 3.7      CO2 22*   BUN 13   CREATININE 4.2*   CALCIUM 8.4*       Significant Diagnostics:  I have reviewed all pertinent imaging results/findings within the past 24 hours.

## 2022-09-27 NOTE — PROGRESS NOTES
09/26/22 1850   Vital Signs   Temp 98.2 °F (36.8 °C)   Temp src Oral   Pulse 63   Heart Rate Source Monitor   Resp 20   SpO2 96 %   O2 Device (Oxygen Therapy) room air   /76   BP Location Right arm   BP Method Automatic   Patient Position Lying        Hemodialysis AV Fistula Left upper arm   No Placement Date or Time found.   Present Prior to Hospital Arrival?: Yes  Location: Left upper arm   Site Assessment Clean;Dry;Intact;No redness;No swelling   Patency Present;Thrill;Bruit   Status Deaccessed   Flows Good   Dressing Status Clean;Dry;Intact   Site Condition No complications   Dressing Gauze  (paper tape)   Post-Hemodialysis Assessment   Rinseback Volume (mL) 250 mL   Blood Volume Processed (Liters) 53.9 L   Dialyzer Clearance Lightly streaked   Duration of Treatment 166 minutes   Additional Fluid Intake (mL) 500 mL   Total UF (mL) 1374 mL   Net Fluid Removal 874   Patient Response to Treatment Tolerated.   Post-Treatment Weight 65.7 kg (144 lb 13.5 oz)   Treatment Weight Change -2.8   Arterial bleeding stop time (min) 7 min   Venous bleeding stop time (min) 7 min   Post-Hemodialysis Comments HD completed per orders and protocol. Pt requested to stop the treatement with 14 minutes left. SBP remained above 110. No change in patients status

## 2022-09-27 NOTE — DISCHARGE SUMMARY
[facility]  Discharge Summary      Patient Name: Shara Hutchins  MRN: [id number]  Patient Class: IP- Inpatient  Admission Date: 9/15/2022  Hospital Length of Stay: 11 days  Discharge Date and Time:  09/27/2022 8:56 AM  Attending Physician: Ashwin Diaz MD   Discharging Provider: Ashwin Diaz MD  Primary Care Provider: April Horton MD      HPI:   Shara Hutchins is a 72 y.o. White female with known history of Ischemic colitis s/p laprotomy with ischemic sigmoid colon, sigmoid rectum stricture who was discharged yesterday to [facility] presented again with a fall. Patient reports after being discharged she went there and slept. Later she woke up and went to bathroom to clean her ostomy bad and do wound care. When she was returning back, she felt light headed and fell.She did not have her emergency alert necklace and no one checked on her overnight. She laid on the floor overnight and dragged herself out of th bathroom to living room all covered with stools. This morning, facility staff found her and call EMS.In the ER, her vitals and labs are stable. She reports severe weakness and abdominal pain. Patient denies change in vision, hearing, headache, fever, cough, congestion, runny nose, chest pain, shortness of breath, palpitations,  diarrhea, constipation, vomiting, dysuria, hematuria, joint pain and back pain. Hospital medicine consulted for admission.      * No surgery found *      Hospital Course:   9/16: Patient visibly weak and short of breath, patient may be a candidate for SNF.  PT/OT recommendations pending.  Home O2 evaluation.  Consult to Case Management .  Check iron, vitamin B12 and folate levels.  No concerns/issues overnight reported by the patient or the nursing staff.     9/17:  Patient had dialysis done yesterday.  Later in the evening nephrostomy tube was noted to be leaking urine, urology consulted and case was discussed.  Awaiting SNF placement     9/18:   Urology reviewed patient and stated that Cases like this can be managed with serial stent exchange Or she may benefit from a simple nephrectomy but no other recommendations at this time.  No acute overnight event    9/19:  Awaiting placement    9/27/2022  Ms Hutchins is waiting for Dr Chavez to remove her abdominal drain and has no other complaints  ROS: see above otherwise negative X 10  PE: in no distress CLAYTON EOM intact moist mucus membranes Neck supple no use of accessory muscles Lungs no crackles wheezes or rhonchi Heart S 1 S 2 RRR no murmur Abdomen BS+ nontender Ext without CC or E pulses 1-2+ Skin abdominal drain right Neuro no acute sensory or motor deficit       Goals of Care Treatment Preferences:  Code Status: Full Code      Consults:   Consults (From admission, onward)        Status Ordering Provider     Inpatient consult to General Surgery  Once        Provider:  Brigette Chavez MD    Completed SUGEY DEAN     Inpatient consult to Social Work/Case Management  Once        Provider:  (Not yet assigned)    GOPI Larry     Inpatient consult to Nephrology  Once        Provider:  Yasmin Parker MD    Completed ROCIO LYLE          No new Assessment & Plan notes have been filed under this hospital service since the last note was generated.  Service: Hospital Medicine    Final Active Diagnoses:    Diagnosis Date Noted POA    PRINCIPAL PROBLEM:  Fall [W19.XXXA] 09/16/2022 Yes    ESRD on dialysis MWF [N18.6, Z99.2] 03/15/2021 Not Applicable     Chronic    Hypothyroidism [E03.9] 02/10/2020 Yes     Chronic      Problems Resolved During this Admission:       Discharged Condition: good    Disposition: Skilled Nursing Facility    Follow Up:   Follow-up Information     Jorge Webb MD Follow up in 1 day(s).    Specialty: Nephrology  Contact information:  [address]  [facility]  [facility] [address]  [phone]             April Horton MD Follow up  in 1 week(s).    Specialty: Internal Medicine  Contact information:  [address] [facility]  [address]  [place] [address]  [phone]             Brigette Chavez MD Follow up in 1 month(s).    Specialties: General Surgery, Bariatrics, Surgery  Contact information:  [address]  [address]  [place] [address]  [phone]                       Patient Instructions:      Diet Cardiac     Diet renal     Activity as tolerated       Significant Diagnostic Studies: Labs:   BMP:   Recent Labs   Lab 09/26/22  0837 09/27/22  0654   GLU 93 83    140   K 3.8 3.7    109   CO2 23 22*   BUN 26* 13   CREATININE 6.7* 4.2*   CALCIUM 8.3* 8.4*   , CMP   Recent Labs   Lab 09/26/22  0837 09/27/22  0654    140   K 3.8 3.7    109   CO2 23 22*   GLU 93 83   BUN 26* 13   CREATININE 6.7* 4.2*   CALCIUM 8.3* 8.4*   PROT 5.7* 5.6*   ALBUMIN 2.2* 2.2*   BILITOT 0.5 0.8   ALKPHOS 469* 408*   AST 14 15   ALT 13 12   ANIONGAP 9 9   , CBC   Recent Labs   Lab 09/26/22  0837   WBC 7.14   HGB 9.0*   HCT 30.0*      , INR   Lab Results   Component Value Date    INR 1.9 09/15/2022    INR 1.9 09/07/2022    INR 1.2 06/28/2022    INR 1.2 06/28/2022   , Troponin No results for input(s): TROPONINI in the last 168 hours. and All labs within the past 24 hours have been reviewed  Microbiology:   Blood Culture   Lab Results   Component Value Date    LABBLOO No growth after 5 days. 09/07/2022    and Urine Culture    Lab Results   Component Value Date    LABURIN (A) 09/26/2022     ENTEROCOCCUS FAECALIS  > 100,000 cfu/ml  For susceptibility see order #[id number]         Pending Diagnostic Studies:     None         Medications:  Reconciled Home Medications:      Medication List      START taking these medications    doxycycline 100 MG Cap  Commonly known as: VIBRAMYCIN  Take 1 capsule (100 mg total) by mouth every 12 (twelve) hours. for 7 days     hydrOXYzine HCL 25 MG tablet  Commonly known as: ATARAX  Take 1 tablet (25 mg total) by  mouth 3 (three) times daily as needed for Itching.        CHANGE how you take these medications    levothyroxine 88 MCG tablet  Commonly known as: SYNTHROID  TAKE 1 TABLET BEFORE BREAKFAST  What changed:   · how much to take  · how to take this  · when to take this        CONTINUE taking these medications    AURYXIA 210 mg iron Tab  Generic drug: ferric citrate  Take 2 tablets by mouth 3 (three) times daily with meals.     cholecalciferol (vitamin D3) 25 mcg (1,000 unit) capsule  Commonly known as: VITAMIN D3  Take 1 capsule (1,000 Units total) by mouth once daily.     diltiaZEM 120 MG Cp24  Commonly known as: CARDIZEM CD  Take 1 capsule (120 mg total) by mouth once daily.     gabapentin 100 MG capsule  Commonly known as: NEURONTIN  Take 100 mg by mouth 2 (two) times daily. HCS     LORazepam 0.5 MG tablet  Commonly known as: ATIVAN  Take 1 tablet (0.5 mg total) by mouth every evening.     oxyCODONE 5 MG immediate release tablet  Commonly known as: ROXICODONE  Take 1 tablet (5 mg total) by mouth every 4 (four) hours as needed for Pain.     promethazine 25 MG tablet  Commonly known as: PHENERGAN  TAKE 1 TABLET (25 MG TOTAL) BY MOUTH 2 (TWO) TIMES DAILY AS NEEDED FOR NAUSEA.     rOPINIRole 0.5 MG tablet  Commonly known as: REQUIP  Take 0.5 mg by mouth every 8 (eight) hours.     sertraline 50 MG tablet  Commonly known as: ZOLOFT  Take 1 tablet (50 mg total) by mouth every evening. Take 50 mg by mouth every evening.     traMADoL 50 mg tablet  Commonly known as: ULTRAM  Take 1 tablet (50 mg total) by mouth every 6 (six) hours as needed.     zinc gluconate 50 mg tablet  Take 50 mg by mouth once daily.        ASK your doctor about these medications    hydrALAZINE 50 MG tablet  Commonly known as: APRESOLINE  Take 1 tablet (50 mg total) by mouth every 8 (eight) hours.            Indwelling Lines/Drains at time of discharge:   Lines/Drains/Airways     Drain  Duration                Hemodialysis AV Fistula Left upper arm --  days         Ureteral Drain/Stent 08/30/22 Right ureter 7 Fr. 28 days         Closed/Suction Drain 09/07/22 1718 Right;Anterior RUQ Bulb 19 Fr. 19 days         Colostomy 09/07/22 1750 Descending/sigmoid LLQ 19 days                Time spent on the discharge of patient: 33 minutes         Ashwin Diaz MD  Department of Hospital Medicine  [facility]

## 2022-10-04 NOTE — PT/OT/SLP DISCHARGE
Occupational Therapy Discharge Summary    Shara Hutchins  MRN: [id number]   Principal Problem: Fall      Patient Discharged from acute Occupational Therapy on 09/27/22.  Please refer to prior OT note dated 09/23/22 for functional status.    Assessment:      Patient appropriate for care in another setting.    Objective:     GOALS:   Multidisciplinary Problems       Occupational Therapy Goals          Problem: Occupational Therapy    Goal Priority Disciplines Outcome Interventions   Occupational Therapy Goal     OT, PT/OT Ongoing, Progressing    Description: Goals to be met by: 10/17/22     Patient will increase functional independence with ADLs by performing:    UE Dressing with Supervision.  LE Dressing with Supervision.  Grooming while standing at sink with Supervision.  Toileting from toilet with Supervision for hygiene and clothing management.   Toilet transfer to toilet with Supervision.                         Reasons for Discontinuation of Therapy Services  Transfer to alternate level of care.      Plan:     Patient Discharged to: Skilled Nursing Facility    10/4/2022

## 2022-10-29 NOTE — ED PROVIDER NOTES
Encounter Date: 10/29/2022       History     Chief Complaint   Patient presents with    Fall     Fall from standing and hitting ribs on TV stand.      HPI  Review of patient's allergies indicates:   Allergen Reactions    Ciprofloxacin Other (See Comments)     Elevated liver enzymes      Pcn [penicillins] Anaphylaxis     TOLERATES ROCEPHIN WITHOUT DIFFICULTY     Past Medical History:   Diagnosis Date    Cellulitis of trunk     Perineum, numerous episodes    Chronic diarrhea 02/10/2020    Short-gut syndrome    End stage renal disease on dialysis 01/07/2018    Fatigue     Hypertension     Hypothyroidism 02/10/2020    Iron deficiency anemia due to chronic blood loss 01/07/2018    Pulmonary hypertension 03/20/2020    Vulvar cancer 01/07/2019     Past Surgical History:   Procedure Laterality Date    ABDOMINAL SURGERY      ANGIOGRAPHY OF ARTERIOVENOUS SHUNT N/A 01/19/2022    Procedure: Fistulogram with Possible Intervention;  Surgeon: Raz Maher MD;  Location: [facility] CATH/EP LAB;  Service: Cardiothoracic;  Laterality: N/A;    APPENDECTOMY      AV FISTULA PLACEMENT Left 2018    BACK SURGERY      BLADDER FULGURATION N/A 06/16/2020    Procedure: FULGURATION, BLADDER;  Surgeon: Damari Barber Jr., MD;  Location: [facility];  Service: Urology;  Laterality: N/A;    carpel tunnel surgery once on each hand      CERVICAL FUSION      x 4    cervix repair      CHOLECYSTECTOMY  2000    COLECTOMY      CYSTOSCOPY W/ RETROGRADES  06/16/2020    Procedure: CYSTOSCOPY, WITH RETROGRADE PYELOGRAM;  Surgeon: Damari Barber Jr., MD;  Location: [facility] OR;  Service: Urology;;    CYSTOSCOPY W/ RETROGRADES Right 04/20/2021    Procedure: CYSTOSCOPY, WITH RETROGRADE PYELOGRAM;  Surgeon: Damari Barber Jr., MD;  Location: [facility] OR;  Service: Urology;  Laterality: Right;    CYSTOSCOPY W/ URETERAL STENT PLACEMENT Right 03/16/2021    Procedure: CYSTOSCOPY, WITH URETERAL STENT INSERTION;  Surgeon: Damari Barber Jr., MD;  Location: [facility] OR;  Service:  Urology;  Laterality: Right;    CYSTOSCOPY W/ URETERAL STENT PLACEMENT Right 05/18/2021    Procedure: CYSTOSCOPY, WITH URETERAL STENT INSERTION;  Surgeon: Damari Barber Jr., MD;  Location: [facility] OR;  Service: Urology;  Laterality: Right;    CYSTOSCOPY W/ URETERAL STENT PLACEMENT Right 06/22/2022    Procedure: CYSTOSCOPY, WITH URETERAL STENT INSERTION;  Surgeon: Gaby Garcia MD;  Location: [facility] OR;  Service: Urology;  Laterality: Right;  cysto stent exchange    CYSTOSCOPY W/ URETERAL STENT REMOVAL Right 04/20/2021    Procedure: CYSTOSCOPY, WITH URETERAL STENT REMOVAL;  Surgeon: Damari Barber Jr., MD;  Location: [facility] OR;  Service: Urology;  Laterality: Right;    CYSTOSCOPY WITH URETEROSCOPY, RETROGRADE PYELOGRAPHY, AND INSERTION OF STENT Right 03/30/2021    Procedure: CYSTOSCOPY, WITH RETROGRADE PYELOGRAM AND URETERAL STENT INSERTION;  Surgeon: Damari Barber Jr., MD;  Location: [facility] OR;  Service: Urology;  Laterality: Right;    CYSTOURETEROSCOPY WITH RETROGRADE PYELOGRAPHY AND INSERTION OF STENT INTO URETER Right 01/18/2022    Procedure: CYSTOURETEROSCOPY, WITH RETROGRADE PYELOGRAM AND URETERAL STENT INSERTION;  Surgeon: Damari Barber Jr., MD;  Location: [facility];  Service: Urology;  Laterality: Right;    CYSTOURETEROSCOPY WITH RETROGRADE PYELOGRAPHY AND INSERTION OF STENT INTO URETER Right 8/30/2022    Procedure: CYSTOURETEROSCOPY, WITH RETROGRADE PYELOGRAM AND URETERAL STENT INSERTION;  Surgeon: Damari Barber Jr., MD;  Location: [facility];  Service: Urology;  Laterality: Right;    DIAGNOSTIC LAPAROSCOPY N/A 02/13/2020    Procedure: LAPAROSCOPY, DIAGNOSTIC;  Surgeon: Robib Lovell III, MD;  Location: [facility];  Service: General;  Laterality: N/A;    HYSTERECTOMY  1980    ILEOSTOMY N/A 02/13/2020    Procedure: CREATION, ILEOSTOMY Loop;  Surgeon: Robbi Lovell III, MD;  Location: [facility] OR;  Service: General;  Laterality: N/A;    ILEOSTOMY CLOSURE N/A 05/18/2020    Procedure: CLOSURE, ILEOSTOMY;   Surgeon: Robbi Lovell III, MD;  Location: [facility] OR;  Service: General;  Laterality: N/A;    LASER LITHOTRIPSY Right 03/30/2021    Procedure: LITHOTRIPSY, USING LASER;  Surgeon: Damari Barber Jr., MD;  Location: [facility] OR;  Service: Urology;  Laterality: Right;    LUMBAR LAMINECTOMY      LYSIS OF ADHESIONS N/A 02/13/2020    Procedure: LYSIS, ADHESIONS;  Surgeon: Robbi Lovell III, MD;  Location: [facility] OR;  Service: General;  Laterality: N/A;    NECK SURGERY      OSTEOTOMY  9/7/2022    Procedure: OSTEOTOMY;  Surgeon: Brigette Chavez MD;  Location: [facility] OR;  Service: General;;    PERCUTANEOUS TRANSLUMINAL ANGIOPLASTY OF ARTERIOVENOUS FISTULA N/A 01/19/2022    Procedure: PTA, AV FISTULA;  Surgeon: Raz Maher MD;  Location: [facility] CATH/EP LAB;  Service: Cardiothoracic;  Laterality: N/A;    PHLEBOGRAPHY  02/28/2020    Procedure: VENOGRAM;  Surgeon: Robbi Lovell III, MD;  Location: [facility] OR;  Service: General;;    REMOVAL OF VASCULAR ACCESS PORT Right 02/13/2020    Procedure: REMOVAL, VASCULAR ACCESS PORT;  Surgeon: Robbi Lovell III, MD;  Location: [facility] OR;  Service: General;  Laterality: Right;    RETROGRADE PYELOGRAPHY  03/16/2021    Procedure: PYELOGRAM, RETROGRADE;  Surgeon: Damari Barber Jr., MD;  Location: [facility] OR;  Service: Urology;;    RETROGRADE PYELOGRAPHY Right 05/18/2021    Procedure: PYELOGRAM, RETROGRADE;  Surgeon: Damari Barber Jr., MD;  Location: [facility] OR;  Service: Urology;  Laterality: Right;    RETROGRADE PYELOGRAPHY  06/22/2022    Procedure: PYELOGRAM, RETROGRADE;  Surgeon: Gaby Garcia MD;  Location: [facility] OR;  Service: Urology;;    SHOULDER SURGERY      TONSILLECTOMY      URETEROSCOPIC REMOVAL OF URETERIC CALCULUS Right 03/30/2021    Procedure: REMOVAL, CALCULUS, URETER, URETEROSCOPIC;  Surgeon: Damari Barber Jr., MD;  Location: [facility] OR;  Service: Urology;  Laterality: Right;    URETEROSCOPIC REMOVAL OF URETERIC CALCULUS Right 04/20/2021    Procedure: REMOVAL,  CALCULUS, URETER, URETEROSCOPIC;  Surgeon: Damari Barber Jr., MD;  Location: [facility] OR;  Service: Urology;  Laterality: Right;    URETEROSCOPIC REMOVAL OF URETERIC CALCULUS Right 2022    Procedure: REMOVAL, CALCULUS, URETER, URETEROSCOPIC;  Surgeon: Damari Barber Jr., MD;  Location: [facility] OR;  Service: Urology;  Laterality: Right;    vulva cancer       Family History   Problem Relation Age of Onset    Heart disease Mother     Hypertension Mother     Cancer Mother         lung    Heart disease Father     Heart disease Sister         Open heart surgery    No Known Problems Daughter      Social History     Tobacco Use    Smoking status: Former     Packs/day: 1.00     Years: 33.00     Pack years: 33.00     Types: Cigarettes     Quit date: 2000     Years since quittin.8    Smokeless tobacco: Former     Quit date:    Substance Use Topics    Alcohol use: No    Drug use: No     Review of Systems    Physical Exam     Initial Vitals   BP Pulse Resp Temp SpO2   10/29/22 1138 10/29/22 1138 10/29/22 1143 10/29/22 1142 10/29/22 1143   (!) 110/55 87 (!) 22 98.5 °F (36.9 °C) (!) 93 %      MAP       --                Physical Exam    ED Course   Procedures  Labs Reviewed - No data to display       Imaging Results              XR Ribs Min 3 views w/PA Chest Left (Final result)  Result time 10/29/22 12:00:04      Final result by Dixon Thomas MD (10/29/22 12:00:04)                   Narrative:    PA chest with left RIBS    CLINICAL DATA: Trauma    FINDINGS: PA view of the chest is compared to 2022.    Heart size is upper normal. The aortic arch is calcified. Bilateral interstitial prominence is unchanged and likely on the basis of mild fibrosis. There are no pleural effusions. There is no evidence of pneumothorax.    3 images targeted to the left ribs demonstrate acute nondisplaced fractures of the left 11th rib posteriorly and posterolaterally.    IMPRESSION:  1. Acute left 11th rib fracture. No  "pneumothorax or pleural effusion.  2. Chronic appearing mild bilateral abnormal interstitial prominence.    Electronically signed by:  Dixon Thomas MD  10/29/2022 12:00 PM CDT Workstation: [id number]                                     Medications   oxyCODONE-acetaminophen 5-325 mg per tablet 1 tablet (1 tablet Oral Given 10/29/22 1200)     Medical Decision Making:   Clinical Tests:   Radiological Study: Ordered and Reviewed  ED Management:  Attending Note:    I discussed the patient's care with Advanced Practice Clinician.  I reviewed their note and agree with the history, physical, assessment, diagnosis, treatment, all procedures performed, xray and EKG interpretations and discharge plan provided by the Advanced Practice Clinician. My overall impression is acute left 11th rib fracture, fall.  The patient has been instructed to follow up with their physician or the one provided as well as specific return precautions.   Micaela Carranza M.D. 10/29/2022 12:11 PM                            Clinical Impression:    ***Please document a Clinical Impression and click the "Refresh" button to refresh your note and automatically pull in before signing.***         "

## 2022-10-29 NOTE — ED PROVIDER NOTES
Encounter Date: 10/29/2022       History     Chief Complaint   Patient presents with    Fall     Fall from standing and hitting ribs on TV stand.      Presents with complaint of left rib pain.  Patient reports she fell today.  She was not using her walker.  She hit her ribs on the corner of the TV table.  She reports a walker is new to her she just got out of rehab on Thursday.  Patient is alert and oriented.  Denies head injury.  Denies LOC or syncopal episode.  Denies shortness of breath or difficulty breathing.    Review of patient's allergies indicates:   Allergen Reactions    Ciprofloxacin Other (See Comments)     Elevated liver enzymes      Pcn [penicillins] Anaphylaxis     TOLERATES ROCEPHIN WITHOUT DIFFICULTY     Past Medical History:   Diagnosis Date    Cellulitis of trunk     Perineum, numerous episodes    Chronic diarrhea 02/10/2020    Short-gut syndrome    End stage renal disease on dialysis 01/07/2018    Fatigue     Hypertension     Hypothyroidism 02/10/2020    Iron deficiency anemia due to chronic blood loss 01/07/2018    Pulmonary hypertension 03/20/2020    Vulvar cancer 01/07/2019     Past Surgical History:   Procedure Laterality Date    ABDOMINAL SURGERY      ANGIOGRAPHY OF ARTERIOVENOUS SHUNT N/A 01/19/2022    Procedure: Fistulogram with Possible Intervention;  Surgeon: Raz Maher MD;  Location: [facility] CATH/EP LAB;  Service: Cardiothoracic;  Laterality: N/A;    APPENDECTOMY      AV FISTULA PLACEMENT Left 2018    BACK SURGERY      BLADDER FULGURATION N/A 06/16/2020    Procedure: FULGURATION, BLADDER;  Surgeon: Damari Barber Jr., MD;  Location: [facility];  Service: Urology;  Laterality: N/A;    carpel tunnel surgery once on each hand      CERVICAL FUSION      x 4    cervix repair      CHOLECYSTECTOMY  2000    COLECTOMY      CYSTOSCOPY W/ RETROGRADES  06/16/2020    Procedure: CYSTOSCOPY, WITH RETROGRADE PYELOGRAM;  Surgeon: Damari Barber Jr., MD;  Location: [facility];  Service: Urology;;     CYSTOSCOPY W/ RETROGRADES Right 04/20/2021    Procedure: CYSTOSCOPY, WITH RETROGRADE PYELOGRAM;  Surgeon: Damari Barber Jr., MD;  Location: [facility] OR;  Service: Urology;  Laterality: Right;    CYSTOSCOPY W/ URETERAL STENT PLACEMENT Right 03/16/2021    Procedure: CYSTOSCOPY, WITH URETERAL STENT INSERTION;  Surgeon: Damari Barber Jr., MD;  Location: [facility] OR;  Service: Urology;  Laterality: Right;    CYSTOSCOPY W/ URETERAL STENT PLACEMENT Right 05/18/2021    Procedure: CYSTOSCOPY, WITH URETERAL STENT INSERTION;  Surgeon: Damari Barber Jr., MD;  Location: [facility] OR;  Service: Urology;  Laterality: Right;    CYSTOSCOPY W/ URETERAL STENT PLACEMENT Right 06/22/2022    Procedure: CYSTOSCOPY, WITH URETERAL STENT INSERTION;  Surgeon: Gaby Garcia MD;  Location: [facility] OR;  Service: Urology;  Laterality: Right;  cysto stent exchange    CYSTOSCOPY W/ URETERAL STENT REMOVAL Right 04/20/2021    Procedure: CYSTOSCOPY, WITH URETERAL STENT REMOVAL;  Surgeon: Damari Barber Jr., MD;  Location: [facility];  Service: Urology;  Laterality: Right;    CYSTOSCOPY WITH URETEROSCOPY, RETROGRADE PYELOGRAPHY, AND INSERTION OF STENT Right 03/30/2021    Procedure: CYSTOSCOPY, WITH RETROGRADE PYELOGRAM AND URETERAL STENT INSERTION;  Surgeon: Damari Barber Jr., MD;  Location: [facility] OR;  Service: Urology;  Laterality: Right;    CYSTOURETEROSCOPY WITH RETROGRADE PYELOGRAPHY AND INSERTION OF STENT INTO URETER Right 01/18/2022    Procedure: CYSTOURETEROSCOPY, WITH RETROGRADE PYELOGRAM AND URETERAL STENT INSERTION;  Surgeon: Damari Barber Jr., MD;  Location: [facility];  Service: Urology;  Laterality: Right;    CYSTOURETEROSCOPY WITH RETROGRADE PYELOGRAPHY AND INSERTION OF STENT INTO URETER Right 8/30/2022    Procedure: CYSTOURETEROSCOPY, WITH RETROGRADE PYELOGRAM AND URETERAL STENT INSERTION;  Surgeon: Damari Barber Jr., MD;  Location: [facility] OR;  Service: Urology;  Laterality: Right;    DIAGNOSTIC LAPAROSCOPY N/A 02/13/2020    Procedure:  LAPAROSCOPY, DIAGNOSTIC;  Surgeon: Robbi Lovell III, MD;  Location: [facility] OR;  Service: General;  Laterality: N/A;    HYSTERECTOMY  1980    ILEOSTOMY N/A 02/13/2020    Procedure: CREATION, ILEOSTOMY Loop;  Surgeon: Robbi Lovell III, MD;  Location: [facility] OR;  Service: General;  Laterality: N/A;    ILEOSTOMY CLOSURE N/A 05/18/2020    Procedure: CLOSURE, ILEOSTOMY;  Surgeon: Robbi Lovell III, MD;  Location: [facility] OR;  Service: General;  Laterality: N/A;    LASER LITHOTRIPSY Right 03/30/2021    Procedure: LITHOTRIPSY, USING LASER;  Surgeon: Damari Barber Jr., MD;  Location: [facility] OR;  Service: Urology;  Laterality: Right;    LUMBAR LAMINECTOMY      LYSIS OF ADHESIONS N/A 02/13/2020    Procedure: LYSIS, ADHESIONS;  Surgeon: Robbi Lovell III, MD;  Location: [facility] OR;  Service: General;  Laterality: N/A;    NECK SURGERY      OSTEOTOMY  9/7/2022    Procedure: OSTEOTOMY;  Surgeon: Brigette Chavez MD;  Location: [facility] OR;  Service: General;;    PERCUTANEOUS TRANSLUMINAL ANGIOPLASTY OF ARTERIOVENOUS FISTULA N/A 01/19/2022    Procedure: PTA, AV FISTULA;  Surgeon: Raz Maher MD;  Location: [facility] CATH/EP LAB;  Service: Cardiothoracic;  Laterality: N/A;    PHLEBOGRAPHY  02/28/2020    Procedure: VENOGRAM;  Surgeon: Robbi Lovell III, MD;  Location: [facility] OR;  Service: General;;    REMOVAL OF VASCULAR ACCESS PORT Right 02/13/2020    Procedure: REMOVAL, VASCULAR ACCESS PORT;  Surgeon: Robbi Lovell III, MD;  Location: [facility] OR;  Service: General;  Laterality: Right;    RETROGRADE PYELOGRAPHY  03/16/2021    Procedure: PYELOGRAM, RETROGRADE;  Surgeon: Damari Barber Jr., MD;  Location: [facility] OR;  Service: Urology;;    RETROGRADE PYELOGRAPHY Right 05/18/2021    Procedure: PYELOGRAM, RETROGRADE;  Surgeon: Damari Barber Jr., MD;  Location: [facility] OR;  Service: Urology;  Laterality: Right;    RETROGRADE PYELOGRAPHY  06/22/2022    Procedure: PYELOGRAM, RETROGRADE;  Surgeon: Gaby Garcia MD;   Location: [facility] OR;  Service: Urology;;    SHOULDER SURGERY      TONSILLECTOMY      URETEROSCOPIC REMOVAL OF URETERIC CALCULUS Right 2021    Procedure: REMOVAL, CALCULUS, URETER, URETEROSCOPIC;  Surgeon: Damari Barber Jr., MD;  Location: [facility] OR;  Service: Urology;  Laterality: Right;    URETEROSCOPIC REMOVAL OF URETERIC CALCULUS Right 2021    Procedure: REMOVAL, CALCULUS, URETER, URETEROSCOPIC;  Surgeon: Damari Barber Jr., MD;  Location: [facility] OR;  Service: Urology;  Laterality: Right;    URETEROSCOPIC REMOVAL OF URETERIC CALCULUS Right 2022    Procedure: REMOVAL, CALCULUS, URETER, URETEROSCOPIC;  Surgeon: Damari Barber Jr., MD;  Location: [facility] OR;  Service: Urology;  Laterality: Right;    vulva cancer       Family History   Problem Relation Age of Onset    Heart disease Mother     Hypertension Mother     Cancer Mother         lung    Heart disease Father     Heart disease Sister         Open heart surgery    No Known Problems Daughter      Social History     Tobacco Use    Smoking status: Former     Packs/day: 1.00     Years: 33.00     Pack years: 33.00     Types: Cigarettes     Quit date: 2000     Years since quittin.8    Smokeless tobacco: Former     Quit date:    Substance Use Topics    Alcohol use: No    Drug use: No     Review of Systems   Constitutional:  Negative for fatigue and fever.   Respiratory:  Negative for cough, shortness of breath and wheezing.    Cardiovascular:  Negative for chest pain, palpitations and leg swelling.   Gastrointestinal:  Negative for abdominal pain, diarrhea, nausea and vomiting.   Musculoskeletal:  Negative for neck pain.   Skin:  Negative for rash.   Neurological:  Negative for dizziness, syncope, weakness and headaches.   Psychiatric/Behavioral:  Negative for agitation and confusion.      Physical Exam     Initial Vitals   BP Pulse Resp Temp SpO2   10/29/22 1138 10/29/22 1138 10/29/22 1143 10/29/22 1142 10/29/22 1143   (!) 110/55 87 (!) 22 98.5  °F (36.9 °C) (!) 93 %      MAP       --                Physical Exam    Constitutional: She appears well-developed and well-nourished.   HENT:   Head: Normocephalic and atraumatic.   Mouth/Throat: Oropharynx is clear and moist.   Eyes: Conjunctivae are normal.   Neck: Neck supple.   Normal range of motion.  Cardiovascular:  Normal rate, regular rhythm and normal heart sounds.           Pulmonary/Chest: Breath sounds normal. No respiratory distress. She has no wheezes. She has no rhonchi.   Patient does not appear to be short of breath.  Her oxygen saturation is 100%   Musculoskeletal:         General: Normal range of motion.      Cervical back: Normal range of motion and neck supple.      Comments: There is tenderness to the left posterior rib region.  There is no abrasion or bruising.  There is no crepitus.     Neurological: She is alert and oriented to person, place, and time. No sensory deficit. GCS score is 15. GCS eye subscore is 4. GCS verbal subscore is 5. GCS motor subscore is 6.   Skin: Skin is warm and dry. Capillary refill takes less than 2 seconds.   Psychiatric: She has a normal mood and affect. Thought content normal.       ED Course   Procedures  Labs Reviewed - No data to display       Imaging Results              XR Ribs Min 3 views w/PA Chest Left (Final result)  Result time 10/29/22 12:00:04      Final result by Dixon Thomas MD (10/29/22 12:00:04)                   Narrative:    PA chest with left RIBS    CLINICAL DATA: Trauma    FINDINGS: PA view of the chest is compared to September 2022.    Heart size is upper normal. The aortic arch is calcified. Bilateral interstitial prominence is unchanged and likely on the basis of mild fibrosis. There are no pleural effusions. There is no evidence of pneumothorax.    3 images targeted to the left ribs demonstrate acute nondisplaced fractures of the left 11th rib posteriorly and posterolaterally.    IMPRESSION:  1. Acute left 11th rib fracture. No  pneumothorax or pleural effusion.  2. Chronic appearing mild bilateral abnormal interstitial prominence.    Electronically signed by:  Dixon Thomas MD  10/29/2022 12:00 PM CDT Workstation: [id number]                                     Medications   oxyCODONE-acetaminophen 5-325 mg per tablet 1 tablet (1 tablet Oral Given 10/29/22 1200)     Medical Decision Making:   Initial Assessment:   Presents with left posterior knee pain.  Patient reports she is not using her walker fell and hit the ribs on the corner of the TV table.  Denies head injury denies LOC denies nausea denies shortness of breath.  Clinical Tests:   Radiological Study: Reviewed  ED Management:  There is a left 11th rib fracture.  Patient has been given oxycodone 5 mg p.o. for pain with some relief.  She is alert and awake.  She lives at is a nursing home and states they take very good care of her.  I wanted to be assure that she did not live alone.  I have written a prescription for Percocet.  She has been instructed as to the drowsy affect this medication can have on her.  At discharge is patient appeared to be in no acute distress.  She does have increased pain with movement.  This is expected.  She has been given strict return precautions.  The delay in discharge is because we are waiting for a ride for this patient.  The ambulance is scheduled to come pick her up at 3:30 a.m..  She has been resting comfortably.  Have discussed this patient with Dr. Carranza                        Clinical Impression:   Final diagnoses:  [S22.32XA] Closed fracture of one rib of left side, initial encounter (Primary)      ED Disposition Condition    Discharge Stable          ED Prescriptions       Medication Sig Dispense Start Date End Date Auth. Provider    oxyCODONE-acetaminophen (PERCOCET) 5-325 mg per tablet Take 1 tablet by mouth every 6 (six) hours as needed for Pain. 12 tablet 10/29/2022 -- Leena Devi NP          Follow-up Information        Follow up With Specialties Details Why Contact Info    April Horton MD Internal Medicine In 3 days  [address] [facility] [address]  [phone]               Leena Devi NP  10/29/22 1224       Leena Devi NP  10/29/22 1427

## 2022-10-29 NOTE — DISCHARGE INSTRUCTIONS
Taking medication as directed.  Remember this medication can make you sleepy so be very careful.  Use your walker at all times for ambulation.  Return to the emergency room for any concerns or worsening of symptoms.

## 2022-11-16 PROBLEM — K55.9 ISCHEMIC COLON: Status: ACTIVE | Noted: 2022-01-01

## 2022-11-16 PROBLEM — I95.9 HYPOTENSION: Status: ACTIVE | Noted: 2022-01-01

## 2022-11-16 PROBLEM — R94.31 QT PROLONGATION: Status: ACTIVE | Noted: 2022-01-01

## 2022-11-16 PROBLEM — E87.20 LACTIC ACIDOSIS: Status: ACTIVE | Noted: 2022-01-01

## 2022-11-16 PROBLEM — E87.6 HYPOKALEMIA: Status: ACTIVE | Noted: 2022-01-01

## 2022-11-16 NOTE — ED NOTES
Bed: 02A Trauma  Expected date:   Expected time:   Means of arrival:   Comments:  EMS- Hypotension

## 2022-11-16 NOTE — ED PROVIDER NOTES
Encounter Date: 11/16/2022       History     Chief Complaint   Patient presents with    Hypotension     Pt presents to the ER via EMS after being hypotensive at dialysis. Pt had systolic BP of 60 at dialysis.      A 73-year-old with a past medical history of ESRD (dialysis MWF), hypertension, hypothyroidism who presents with 2 days of nausea without vomiting and an episode of hypotension while receiving dialysis.  Patient states that she received approximately 2.5 hours of dialysis before she was noted to be hypotensive.  She also endorses losing approximately 300 mL of blood from her fistula.  She denies recent fever, chills, chest pain, shortness of breath, abdominal pain.  She reports her usual large amount of stool output from her colostomy.    The history is provided by the patient.   Review of patient's allergies indicates:   Allergen Reactions    Ciprofloxacin Other (See Comments)     Elevated liver enzymes      Pcn [penicillins] Anaphylaxis     TOLERATES ROCEPHIN WITHOUT DIFFICULTY     Past Medical History:   Diagnosis Date    Cellulitis of trunk     Perineum, numerous episodes    Chronic diarrhea 02/10/2020    Short-gut syndrome    End stage renal disease on dialysis 01/07/2018    Fatigue     Hypertension     Hypothyroidism 02/10/2020    Iron deficiency anemia due to chronic blood loss 01/07/2018    Pulmonary hypertension 03/20/2020    Vulvar cancer 01/07/2019     Past Surgical History:   Procedure Laterality Date    ABDOMINAL SURGERY      ANGIOGRAPHY OF ARTERIOVENOUS SHUNT N/A 01/19/2022    Procedure: Fistulogram with Possible Intervention;  Surgeon: Raz Maher MD;  Location: [facility] CATH/EP LAB;  Service: Cardiothoracic;  Laterality: N/A;    APPENDECTOMY      AV FISTULA PLACEMENT Left 2018    BACK SURGERY      BLADDER FULGURATION N/A 06/16/2020    Procedure: FULGURATION, BLADDER;  Surgeon: Damari Barber Jr., MD;  Location: [facility] OR;  Service: Urology;  Laterality: N/A;    carpel tunnel surgery once on  each hand      CERVICAL FUSION      x 4    cervix repair      CHOLECYSTECTOMY  2000    COLECTOMY      CYSTOSCOPY W/ RETROGRADES  06/16/2020    Procedure: CYSTOSCOPY, WITH RETROGRADE PYELOGRAM;  Surgeon: Damari Barber Jr., MD;  Location: [facility] OR;  Service: Urology;;    CYSTOSCOPY W/ RETROGRADES Right 04/20/2021    Procedure: CYSTOSCOPY, WITH RETROGRADE PYELOGRAM;  Surgeon: Damari Barber Jr., MD;  Location: [facility] OR;  Service: Urology;  Laterality: Right;    CYSTOSCOPY W/ URETERAL STENT PLACEMENT Right 03/16/2021    Procedure: CYSTOSCOPY, WITH URETERAL STENT INSERTION;  Surgeon: Damari Barber Jr., MD;  Location: [facility] OR;  Service: Urology;  Laterality: Right;    CYSTOSCOPY W/ URETERAL STENT PLACEMENT Right 05/18/2021    Procedure: CYSTOSCOPY, WITH URETERAL STENT INSERTION;  Surgeon: Damari Barber Jr., MD;  Location: [facility] OR;  Service: Urology;  Laterality: Right;    CYSTOSCOPY W/ URETERAL STENT PLACEMENT Right 06/22/2022    Procedure: CYSTOSCOPY, WITH URETERAL STENT INSERTION;  Surgeon: Gaby Garcia MD;  Location: [facility] OR;  Service: Urology;  Laterality: Right;  cysto stent exchange    CYSTOSCOPY W/ URETERAL STENT REMOVAL Right 04/20/2021    Procedure: CYSTOSCOPY, WITH URETERAL STENT REMOVAL;  Surgeon: Damari Barber Jr., MD;  Location: [facility] OR;  Service: Urology;  Laterality: Right;    CYSTOSCOPY WITH URETEROSCOPY, RETROGRADE PYELOGRAPHY, AND INSERTION OF STENT Right 03/30/2021    Procedure: CYSTOSCOPY, WITH RETROGRADE PYELOGRAM AND URETERAL STENT INSERTION;  Surgeon: Damari Barber Jr., MD;  Location: [facility] OR;  Service: Urology;  Laterality: Right;    CYSTOURETEROSCOPY WITH RETROGRADE PYELOGRAPHY AND INSERTION OF STENT INTO URETER Right 01/18/2022    Procedure: CYSTOURETEROSCOPY, WITH RETROGRADE PYELOGRAM AND URETERAL STENT INSERTION;  Surgeon: Damari Barber Jr., MD;  Location: [facility];  Service: Urology;  Laterality: Right;    CYSTOURETEROSCOPY WITH RETROGRADE PYELOGRAPHY AND INSERTION OF STENT INTO  URETER Right 8/30/2022    Procedure: CYSTOURETEROSCOPY, WITH RETROGRADE PYELOGRAM AND URETERAL STENT INSERTION;  Surgeon: Damari Barber Jr., MD;  Location: [facility] OR;  Service: Urology;  Laterality: Right;    DIAGNOSTIC LAPAROSCOPY N/A 02/13/2020    Procedure: LAPAROSCOPY, DIAGNOSTIC;  Surgeon: Robbi Lovell III, MD;  Location: [facility] OR;  Service: General;  Laterality: N/A;    HYSTERECTOMY  1980    ILEOSTOMY N/A 02/13/2020    Procedure: CREATION, ILEOSTOMY Loop;  Surgeon: Robbi Lovell III, MD;  Location: [facility] OR;  Service: General;  Laterality: N/A;    ILEOSTOMY CLOSURE N/A 05/18/2020    Procedure: CLOSURE, ILEOSTOMY;  Surgeon: Robbi Lovell III, MD;  Location: [facility] OR;  Service: General;  Laterality: N/A;    LASER LITHOTRIPSY Right 03/30/2021    Procedure: LITHOTRIPSY, USING LASER;  Surgeon: Damari Barber Jr., MD;  Location: [facility] OR;  Service: Urology;  Laterality: Right;    LUMBAR LAMINECTOMY      LYSIS OF ADHESIONS N/A 02/13/2020    Procedure: LYSIS, ADHESIONS;  Surgeon: Robbi Lovell III, MD;  Location: [facility] OR;  Service: General;  Laterality: N/A;    NECK SURGERY      OSTEOTOMY  9/7/2022    Procedure: OSTEOTOMY;  Surgeon: Brigette Chavez MD;  Location: [facility] OR;  Service: General;;    PERCUTANEOUS TRANSLUMINAL ANGIOPLASTY OF ARTERIOVENOUS FISTULA N/A 01/19/2022    Procedure: PTA, AV FISTULA;  Surgeon: Raz Maher MD;  Location: [facility] CATH/EP LAB;  Service: Cardiothoracic;  Laterality: N/A;    PHLEBOGRAPHY  02/28/2020    Procedure: VENOGRAM;  Surgeon: Robbi Lovell III, MD;  Location: [facility] OR;  Service: General;;    REMOVAL OF VASCULAR ACCESS PORT Right 02/13/2020    Procedure: REMOVAL, VASCULAR ACCESS PORT;  Surgeon: Robbi Lovell III, MD;  Location: [facility] OR;  Service: General;  Laterality: Right;    RETROGRADE PYELOGRAPHY  03/16/2021    Procedure: PYELOGRAM, RETROGRADE;  Surgeon: Damari Barber Jr., MD;  Location: [facility] OR;  Service: Urology;;    RETROGRADE PYELOGRAPHY Right  2021    Procedure: PYELOGRAM, RETROGRADE;  Surgeon: Damari Barber Jr., MD;  Location: [facility] OR;  Service: Urology;  Laterality: Right;    RETROGRADE PYELOGRAPHY  2022    Procedure: PYELOGRAM, RETROGRADE;  Surgeon: Gaby Garcia MD;  Location: [facility] OR;  Service: Urology;;    SHOULDER SURGERY      TONSILLECTOMY      URETEROSCOPIC REMOVAL OF URETERIC CALCULUS Right 2021    Procedure: REMOVAL, CALCULUS, URETER, URETEROSCOPIC;  Surgeon: Damari Barber Jr., MD;  Location: [facility] OR;  Service: Urology;  Laterality: Right;    URETEROSCOPIC REMOVAL OF URETERIC CALCULUS Right 2021    Procedure: REMOVAL, CALCULUS, URETER, URETEROSCOPIC;  Surgeon: Damari Barber Jr., MD;  Location: [facility] OR;  Service: Urology;  Laterality: Right;    URETEROSCOPIC REMOVAL OF URETERIC CALCULUS Right 2022    Procedure: REMOVAL, CALCULUS, URETER, URETEROSCOPIC;  Surgeon: Damari Barber Jr., MD;  Location: [facility];  Service: Urology;  Laterality: Right;    vulva cancer       Family History   Problem Relation Age of Onset    Heart disease Mother     Hypertension Mother     Cancer Mother         lung    Heart disease Father     Heart disease Sister         Open heart surgery    No Known Problems Daughter      Social History     Tobacco Use    Smoking status: Former     Packs/day: 1.00     Years: 33.00     Pack years: 33.00     Types: Cigarettes     Quit date: 2000     Years since quittin.8    Smokeless tobacco: Former     Quit date:    Substance Use Topics    Alcohol use: No    Drug use: No     Review of Systems   Constitutional:  Negative for chills and fever.   HENT:  Negative for congestion and rhinorrhea.    Respiratory:  Negative for cough and shortness of breath.    Cardiovascular:  Negative for chest pain and leg swelling.   Gastrointestinal:  Positive for nausea. Negative for abdominal pain and vomiting.   Genitourinary:  Negative for dysuria and hematuria.   Musculoskeletal:  Negative for back  pain.   Skin:  Negative for rash.   Neurological:  Positive for light-headedness. Negative for syncope and headaches.   Psychiatric/Behavioral:  Negative for agitation and confusion.      Physical Exam     Initial Vitals   BP Pulse Resp Temp SpO2   11/16/22 1304 11/16/22 1304 11/16/22 1304 11/16/22 1306 11/16/22 1304   (!) 94/56 86 18 97.8 °F (36.6 °C) 97 %      MAP       --                Physical Exam    Nursing note and vitals reviewed.  Constitutional: She is not diaphoretic.  Non-toxic appearance.   HENT:   Head: Normocephalic and atraumatic.   Eyes: EOM are normal.   Neck: Neck supple.   Normal range of motion.  Cardiovascular:  Normal rate, regular rhythm and normal heart sounds.           No murmur heard.  Fistula to left upper arm with clean and dry dressing intact, no active bleeding   Pulmonary/Chest: Breath sounds normal. No respiratory distress. She has no wheezes. She has no rhonchi. She has no rales.   Abdominal: Abdomen is soft. She exhibits no distension. There is no abdominal tenderness.   Colostomy in place to lower abdomen with stool present, no surrounding erythema or drainage. There is no guarding.   Musculoskeletal:         General: No edema.      Cervical back: Normal range of motion and neck supple.     Neurological: She is alert and oriented to person, place, and time.   Moves all extremities well and equally.  Sensation light touch intact in all extremities.  Cranial nerves 2-12 grossly intact   Skin: Skin is warm and dry.   Psychiatric: She has a normal mood and affect. Thought content normal.       ED Course   Procedures  Labs Reviewed   CBC W/ AUTO DIFFERENTIAL - Abnormal; Notable for the following components:       Result Value    RBC 2.48 (*)     Hemoglobin 8.6 (*)     Hematocrit 27.5 (*)      (*)     MCH 34.7 (*)     MCHC 31.3 (*)     RDW 18.7 (*)     Gran # (ANC) 8.6 (*)     Gran % 75.5 (*)     Lymph % 17.2 (*)     All other components within normal limits   COMPREHENSIVE  METABOLIC PANEL - Abnormal; Notable for the following components:    Potassium 1.9 (*)     BUN 5 (*)     Creatinine 2.4 (*)     Calcium 6.2 (*)     Total Protein 4.8 (*)     Albumin 2.2 (*)     eGFR 20.9 (*)     All other components within normal limits    Narrative:     CA, K critical result(s) repeated. Called and verbal readback   obtained from Alexis Washburn RN/ED by [email or web address] 11/16/2022 15:18   LACTIC ACID, PLASMA - Abnormal; Notable for the following components:    Lactate (Lactic Acid) 2.2 (*)     All other components within normal limits    Narrative:     LA critical result(s) repeated. Called and verbal readback obtained   from Alexis Washburn RN/ED by [email or web address] 11/16/2022 15:18   MAGNESIUM - Abnormal; Notable for the following components:    Magnesium 1.2 (*)     All other components within normal limits   PROCALCITONIN - Abnormal; Notable for the following components:    Procalcitonin 1.31 (*)     All other components within normal limits   B-TYPE NATRIURETIC PEPTIDE - Abnormal; Notable for the following components:     (*)     All other components within normal limits   CULTURE, BLOOD   CULTURE, BLOOD   TROPONIN I HIGH SENSITIVITY   TSH   TSH   LACTIC ACID, PLASMA   URINALYSIS, REFLEX TO URINE CULTURE        ECG Results              EKG 12-lead (In process)  Result time 11/16/22 13:19:22      In process by Interface, Lab In [facility] (11/16/22 13:19:22)                   Narrative:    Test Reason : I95.9,    Vent. Rate : 087 BPM     Atrial Rate : 087 BPM     P-R Int : 148 ms          QRS Dur : 084 ms      QT Int : 420 ms       P-R-T Axes : 076 068 253 degrees     QTc Int : 505 ms    Sinus rhythm with Premature atrial complexes  ST and T wave abnormality, consider inferior ischemia  ST and T wave abnormality, consider anterolateral ischemia  Abnormal ECG  When compared with ECG of 15-SEP-2022 10:24,  Non-specific change in ST segment in Lateral leads  T wave inversion now evident in Inferior leads  T wave inversion  more evident in Anterior-lateral leads  QT has lengthened    Referred By: [email or web address]   SELF           Confirmed By:                                   Imaging Results              X-Ray Chest AP Portable (Final result)  Result time 11/16/22 13:57:33      Final result by Justino Rivera MD (11/16/22 13:57:33)                   Narrative:    XR CHEST 1 VIEW    CLINICAL HISTORY:  72 years Female Sepsis    COMPARISON: September 26, 2022    FINDINGS: Cardiac silhouette size is within normal limits. Atherosclerotic calcification of the aorta. No confluent airspace disease. No large pleural effusion or pneumothorax. No acute osseous abnormality. Vascular stent or graft within the left upper arm. Right upper quadrant cholecystectomy clips.    IMPRESSION: No acute pulmonary process.    Electronically signed by:  Justino Rivera MD  11/16/2022 1:57 PM CST Workstation: [id number]                                     Medications   NORepinephrine 4 mg in dextrose 5% 250 mL infusion (premix) (titrating) (0.3 mcg/kg/min × 47.2 kg (Dosing Weight) Intravenous Rate/Dose Change 11/16/22 1556)   magnesium sulfate in dextrose IVPB (premix) 1 g (has no administration in time range)   sodium chloride 0.9% bolus 250 mL (250 mLs Intravenous New Bag 11/16/22 1749)   midodrine tablet 5 mg (5 mg Oral Given 11/16/22 1801)   lactated ringers bolus 472 mL (0 mLs Intravenous Stopped 11/16/22 1407)   ondansetron injection 4 mg (4 mg Intravenous Given 11/16/22 1407)   acetaminophen tablet 1,000 mg (1,000 mg Oral Given 11/16/22 1425)   magnesium sulfate in dextrose IVPB (premix) 1 g (0 g Intravenous Stopped 11/16/22 1725)   calcium gluconate 1 g in NS IVPB (premixed) (0 g Intravenous Stopped 11/16/22 1725)   potassium chloride 10 mEq in 100 mL IVPB (10 mEq Intravenous New Bag 11/16/22 1745)   cefepime in dextrose 5 % 1 gram/50 mL IVPB 1 g (1 g Intravenous New Bag 11/16/22 1742)   potassium chloride SA CR tablet 40 mEq (40 mEq Oral Given 11/16/22 1750)    morphine injection 4 mg (4 mg Intravenous Given 11/16/22 1802)   ondansetron injection 4 mg (4 mg Intravenous Given 11/16/22 1802)     Medical Decision Making:   Initial Assessment:   Patient is a 72-year-old female here with hypotension during dialysis, bleeding from her fistula, and 2 days of nausea.  Patient nontoxic appearing, blood pressure 94/56 with normal heart rate, satting well on room air with normal respiratory rate, afebrile.  Clean and dry dressing applied to left upper arm fistula without active bleeding.   Differential Diagnosis:   Symptomatic anemia, abscess, electrolyte abnormality, hypovolemia, ACS, arrhythmia  Clinical Tests:   Lab Tests: Ordered and Reviewed  The following lab test(s) were unremarkable: Troponin       <> Summary of Lab: Lactate elevated at 2.2, with already given, will defer further fluids given history of ESRD.  Mag, potassium, calcium decreased.  BNP elevated, procalcitonin elevated.  CBC with normal white count, macrocytic anemia similar to baseline.  TSH unremarkable.  Radiological Study: Ordered and Reviewed  Medical Tests: Ordered and Reviewed  ED Management:  EKG shows normal sinus rhythm with 87, no STEMI, nonspecific T-wave abnormality.  Patient continued to be hypotensive despite small fluid bolus.  Levophed started with improvement in maps to high 60s.  Electrolyte repletion ordered.  Zofran given for nausea.  Dose of cefepime also ordered for empiric antibiotic coverage.  Blood cultures pending.  Patient consult to Medicine for admission for further care.  Case discussed with Dr. Ford.                        Clinical Impression:   Final diagnoses:  [I95.9] Hypotension (Primary)  [E87.6] Hypokalemia  [E83.42] Hypomagnesemia  [E83.51] Hypocalcemia        ED Disposition Condition    Admit Stable                Tanya Feliciano MD  Resident  11/16/22 1815

## 2022-11-17 PROBLEM — E83.42 HYPOMAGNESEMIA: Status: RESOLVED | Noted: 2021-05-18 | Resolved: 2022-01-01

## 2022-11-17 PROBLEM — E87.1 HYPONATREMIA: Status: ACTIVE | Noted: 2022-01-01

## 2022-11-17 PROBLEM — Z90.49 STATUS POST TOTAL COLECTOMY: Status: ACTIVE | Noted: 2022-01-01

## 2022-11-17 PROBLEM — S37.20XA BLADDER INJURY: Status: ACTIVE | Noted: 2022-01-01

## 2022-11-17 NOTE — H&P
Former[facility] - Emergency Dept  Hospital Medicine  History & Physical    Patient Name: Shara Hutchins  MRN: [id number]  Patient Class: IP- Inpatient  Admission Date: 11/16/2022  Attending Physician: Dana Ovalle MD   Primary Care Provider: April Horton MD         Patient information was obtained from patient, past medical records and ER records.     Subjective:     Principal Problem:Hypotension    Chief Complaint:   Chief Complaint   Patient presents with    Hypotension     Pt presents to the ER via EMS after being hypotensive at dialysis. Pt had systolic BP of 60 at dialysis.         HPI: Ms. Hutchins is a 72-year-old female who was brought to the ED via EMS from outpatient dialysis center due to hypotension.  Patient with complicated recent history, about 2 months ago, admitted with ischemic colitis status post laparotomy with colostomy, stated she stayed in the hospital for about 1 month, she was discharge but had fall at assisted living facility and re-presented to the hospital following day, since then has had persistent hypotension, antihypertensives were discontinued, she was started on midodrine.  She was subsequently discharged to inpatient rehab and states she had returned to assisted living facility about 2 weeks ago.  Known history of ESRD on HD MWF.  She states blood pressure has been running low since previous admission, yesterday midodrine was increased from 5 mg t.i.d. to 10 mg t.i.d..  States her appetite has been low, over the last 24 hours has noticed decreased output from her colostomy, chronic nausea without any recent change, no significant increased abdominal pain.  States at dialysis today, near end of session, needle got dislodged from fistula, about 300 cc of blood loss, states following this she became lightheaded/dizzy, blood pressure 60/30 and she was referred to the ED.  States with dialysis recently they have not been removing fluid due to hypotension.  States she does  have home health at assisted living facility.  In the ED blood pressure was low, 94/56, received fluid bolus, subsequently started on Levophed, during my encounter blood pressure 116/57 on Levophed 0.3 mcg/kg/min.  Patient is currently reporting back pain.  Labs with WBC 11.36, hemoglobin 8.6, platelet 162, potassium 1.9, magnesium 1.2, albumin 2.2, lactic acid 2.2, BUN/creatinine 5/2.4.  EKG reviewed.  Chest x-ray clear.  Previous echo with EF of 70%.  She received acetaminophen, magnesium, potassium, 472 cc fluid bolus as well as cefepime in the ED.  Case discussed with ED provider who requested admission.  Plan of care was discussed with patient, no visitors at bedside.  Address code status and patient confirms DNR.      Past Medical History:   Diagnosis Date    Cellulitis of trunk     Perineum, numerous episodes    Chronic diarrhea 02/10/2020    Short-gut syndrome    End stage renal disease on dialysis 01/07/2018    Fatigue     Hypertension     Hypothyroidism 02/10/2020    Iron deficiency anemia due to chronic blood loss 01/07/2018    Pulmonary hypertension 03/20/2020    Vulvar cancer 01/07/2019       Past Surgical History:   Procedure Laterality Date    ABDOMINAL SURGERY      ANGIOGRAPHY OF ARTERIOVENOUS SHUNT N/A 01/19/2022    Procedure: Fistulogram with Possible Intervention;  Surgeon: Raz Maher MD;  Location: [place] CATH/EP LAB;  Service: Cardiothoracic;  Laterality: N/A;    APPENDECTOMY      AV FISTULA PLACEMENT Left 2018    BACK SURGERY      BLADDER FULGURATION N/A 06/16/2020    Procedure: FULGURATION, BLADDER;  Surgeon: Damari Barber Jr., MD;  Location: [facility];  Service: Urology;  Laterality: N/A;    carpel tunnel surgery once on each hand      CERVICAL FUSION      x 4    cervix repair      CHOLECYSTECTOMY  2000    COLECTOMY      CYSTOSCOPY W/ RETROGRADES  06/16/2020    Procedure: CYSTOSCOPY, WITH RETROGRADE PYELOGRAM;  Surgeon: Damari Barber Jr., MD;  Location: [facility];   Service: Urology;;    CYSTOSCOPY W/ RETROGRADES Right 04/20/2021    Procedure: CYSTOSCOPY, WITH RETROGRADE PYELOGRAM;  Surgeon: Damari Barber Jr., MD;  Location: [facility] OR;  Service: Urology;  Laterality: Right;    CYSTOSCOPY W/ URETERAL STENT PLACEMENT Right 03/16/2021    Procedure: CYSTOSCOPY, WITH URETERAL STENT INSERTION;  Surgeon: Damari Barber Jr., MD;  Location: [facility] OR;  Service: Urology;  Laterality: Right;    CYSTOSCOPY W/ URETERAL STENT PLACEMENT Right 05/18/2021    Procedure: CYSTOSCOPY, WITH URETERAL STENT INSERTION;  Surgeon: Damari Barber Jr., MD;  Location: [facility] OR;  Service: Urology;  Laterality: Right;    CYSTOSCOPY W/ URETERAL STENT PLACEMENT Right 06/22/2022    Procedure: CYSTOSCOPY, WITH URETERAL STENT INSERTION;  Surgeon: Gaby Garcia MD;  Location: [facility];  Service: Urology;  Laterality: Right;  cysto stent exchange    CYSTOSCOPY W/ URETERAL STENT REMOVAL Right 04/20/2021    Procedure: CYSTOSCOPY, WITH URETERAL STENT REMOVAL;  Surgeon: Damari Barber Jr., MD;  Location: [facility] OR;  Service: Urology;  Laterality: Right;    CYSTOSCOPY WITH URETEROSCOPY, RETROGRADE PYELOGRAPHY, AND INSERTION OF STENT Right 03/30/2021    Procedure: CYSTOSCOPY, WITH RETROGRADE PYELOGRAM AND URETERAL STENT INSERTION;  Surgeon: Damari Barber Jr., MD;  Location: [facility] OR;  Service: Urology;  Laterality: Right;    CYSTOURETEROSCOPY WITH RETROGRADE PYELOGRAPHY AND INSERTION OF STENT INTO URETER Right 01/18/2022    Procedure: CYSTOURETEROSCOPY, WITH RETROGRADE PYELOGRAM AND URETERAL STENT INSERTION;  Surgeon: Damari Barber Jr., MD;  Location: [facility] OR;  Service: Urology;  Laterality: Right;    CYSTOURETEROSCOPY WITH RETROGRADE PYELOGRAPHY AND INSERTION OF STENT INTO URETER Right 8/30/2022    Procedure: CYSTOURETEROSCOPY, WITH RETROGRADE PYELOGRAM AND URETERAL STENT INSERTION;  Surgeon: Damari Barber Jr., MD;  Location: [facility] OR;  Service: Urology;  Laterality: Right;    DIAGNOSTIC LAPAROSCOPY N/A  02/13/2020    Procedure: LAPAROSCOPY, DIAGNOSTIC;  Surgeon: Robbi Lovell III, MD;  Location: [facility] OR;  Service: General;  Laterality: N/A;    HYSTERECTOMY  1980    ILEOSTOMY N/A 02/13/2020    Procedure: CREATION, ILEOSTOMY Loop;  Surgeon: oRbbi Lovell III, MD;  Location: [facility] OR;  Service: General;  Laterality: N/A;    ILEOSTOMY CLOSURE N/A 05/18/2020    Procedure: CLOSURE, ILEOSTOMY;  Surgeon: Robbi Lovell III, MD;  Location: [facility] OR;  Service: General;  Laterality: N/A;    LASER LITHOTRIPSY Right 03/30/2021    Procedure: LITHOTRIPSY, USING LASER;  Surgeon: Damari Barber Jr., MD;  Location: [facility] OR;  Service: Urology;  Laterality: Right;    LUMBAR LAMINECTOMY      LYSIS OF ADHESIONS N/A 02/13/2020    Procedure: LYSIS, ADHESIONS;  Surgeon: Robbi Lovell III, MD;  Location: [facility] OR;  Service: General;  Laterality: N/A;    NECK SURGERY      OSTEOTOMY  9/7/2022    Procedure: OSTEOTOMY;  Surgeon: Brigette Chavez MD;  Location: [facility] OR;  Service: General;;    PERCUTANEOUS TRANSLUMINAL ANGIOPLASTY OF ARTERIOVENOUS FISTULA N/A 01/19/2022    Procedure: PTA, AV FISTULA;  Surgeon: Raz Maher MD;  Location: [facility] CATH/EP LAB;  Service: Cardiothoracic;  Laterality: N/A;    PHLEBOGRAPHY  02/28/2020    Procedure: VENOGRAM;  Surgeon: Robbi Lovell III, MD;  Location: [facility] OR;  Service: General;;    REMOVAL OF VASCULAR ACCESS PORT Right 02/13/2020    Procedure: REMOVAL, VASCULAR ACCESS PORT;  Surgeon: Robbi Lovell III, MD;  Location: [facility] OR;  Service: General;  Laterality: Right;    RETROGRADE PYELOGRAPHY  03/16/2021    Procedure: PYELOGRAM, RETROGRADE;  Surgeon: Damari Barber Jr., MD;  Location: [facility] OR;  Service: Urology;;    RETROGRADE PYELOGRAPHY Right 05/18/2021    Procedure: PYELOGRAM, RETROGRADE;  Surgeon: Damari Barber Jr., MD;  Location: [facility] OR;  Service: Urology;  Laterality: Right;    RETROGRADE PYELOGRAPHY  06/22/2022    Procedure: PYELOGRAM, RETROGRADE;   Surgeon: Gaby Garcia MD;  Location: [facility] OR;  Service: Urology;;    SHOULDER SURGERY      TONSILLECTOMY      URETEROSCOPIC REMOVAL OF URETERIC CALCULUS Right 03/30/2021    Procedure: REMOVAL, CALCULUS, URETER, URETEROSCOPIC;  Surgeon: Damari Barber Jr., MD;  Location: [facility] OR;  Service: Urology;  Laterality: Right;    URETEROSCOPIC REMOVAL OF URETERIC CALCULUS Right 04/20/2021    Procedure: REMOVAL, CALCULUS, URETER, URETEROSCOPIC;  Surgeon: Damari Barber Jr., MD;  Location: [facility] OR;  Service: Urology;  Laterality: Right;    URETEROSCOPIC REMOVAL OF URETERIC CALCULUS Right 8/30/2022    Procedure: REMOVAL, CALCULUS, URETER, URETEROSCOPIC;  Surgeon: Damari Barber Jr., MD;  Location: [facility] OR;  Service: Urology;  Laterality: Right;    vulva cancer         Review of patient's allergies indicates:   Allergen Reactions    Ciprofloxacin Other (See Comments)     Elevated liver enzymes      Pcn [penicillins] Anaphylaxis     TOLERATES ROCEPHIN WITHOUT DIFFICULTY       Current Facility-Administered Medications on File Prior to Encounter   Medication    electrolyte-S (ISOLYTE)    lactated ringers infusion     Current Outpatient Medications on File Prior to Encounter   Medication Sig    cholecalciferol, vitamin D3, (VITAMIN D3) 25 mcg (1,000 unit) capsule Take 1 capsule (1,000 Units total) by mouth once daily.    dronabinoL (MARINOL) 2.5 MG capsule Take 2.5 mg by mouth 2 (two) times daily before meals.    ergocalciferol (ERGOCALCIFEROL) 50,000 unit Cap Take 50,000 Units by mouth every 7 days. MONDAYS    ferric citrate (AURYXIA) 210 mg iron Tab Take 2 tablets by mouth 3 (three) times daily with meals.    gabapentin (NEURONTIN) 100 MG capsule Take 100 mg by mouth 2 (two) times daily.    hydrALAZINE (APRESOLINE) 50 MG tablet Take 1 tablet (50 mg total) by mouth every 8 (eight) hours.    hydrOXYzine HCL (ATARAX) 25 MG tablet Take 1 tablet (25 mg total) by mouth 3 (three) times daily as needed for  Itching.    levothyroxine (SYNTHROID) 88 MCG tablet TAKE 1 TABLET BEFORE BREAKFAST (Patient taking differently: Take 88 mcg by mouth before breakfast. TAKE 1 TABLET BEFORE BREAKFAST)    LORazepam (ATIVAN) 0.5 MG tablet Take 1 tablet (0.5 mg total) by mouth every evening.    midodrine (PROAMATINE) 5 MG Tab Take 5 mg by mouth 3 (three) times daily.    mirtazapine (REMERON) 15 MG tablet Take 7.5 mg by mouth every evening.    promethazine (PHENERGAN) 25 MG tablet TAKE 1 TABLET (25 MG TOTAL) BY MOUTH 2 (TWO) TIMES DAILY AS NEEDED FOR NAUSEA.    rOPINIRole (REQUIP) 0.5 MG tablet Take 0.5 mg by mouth every 8 (eight) hours.    sertraline (ZOLOFT) 50 MG tablet Take 1 tablet (50 mg total) by mouth every evening. Take 50 mg by mouth every evening.    simethicone (MYLICON) 80 MG chewable tablet Take 80 mg by mouth every 6 (six) hours as needed.    traMADoL (ULTRAM) 50 mg tablet Take 1 tablet by mouth 4 (four) times daily as needed.    zinc gluconate 50 mg tablet Take 50 mg by mouth once daily.    cholestyramine (QUESTRAN) 4 gram packet Take 4 g by mouth 2 (two) times daily.    diltiaZEM (CARDIZEM CD) 120 MG Cp24 Take 1 capsule (120 mg total) by mouth once daily.    ferrous sulfate (FEOSOL) 325 mg (65 mg iron) Tab tablet 325 mg.    oxyCODONE (OXY-IR) 5 mg Cap Take 5 mg by mouth.    oxyCODONE-acetaminophen (PERCOCET) 5-325 mg per tablet Take 1 tablet by mouth every 6 (six) hours as needed for Pain.    sevelamer carbonate (RENVELA) 800 mg Tab 800 mg.    zinc sulfate (ZINCATE) 50 mg zinc (220 mg) capsule Take 1 capsule by mouth once daily.    [DISCONTINUED] miconazole (MICOTIN) 2 % cream Apply topically 2 (two) times daily.     Family History       Problem Relation (Age of Onset)    Cancer Mother    Heart disease Mother, Father, Sister    Hypertension Mother    No Known Problems Daughter          Tobacco Use    Smoking status: Former     Packs/day: 1.00     Years: 33.00     Pack years: 33.00     Types: Cigarettes      Quit date: 2000     Years since quittin.8    Smokeless tobacco: Former     Quit date:    Substance and Sexual Activity    Alcohol use: No    Drug use: No    Sexual activity: Not on file     Review of Systems   Constitutional:  Positive for appetite change. Negative for fever.   HENT:  Negative for trouble swallowing.    Respiratory:  Negative for shortness of breath.    Cardiovascular:  Negative for chest pain and leg swelling.   Gastrointestinal:  Positive for nausea (chronic). Negative for vomiting.        Has colostomy    Genitourinary:         Produces small amount of urine   Musculoskeletal:  Positive for back pain.   Allergic/Immunologic: Negative for immunocompromised state.   Neurological:  Positive for dizziness and light-headedness. Negative for headaches.   Psychiatric/Behavioral:  Negative for confusion.    Objective:     Vital Signs (Most Recent):  Temp: 97.8 °F (36.6 °C) (22 1306)  Pulse: 85 (22 183)  Resp: 18 (22)  BP: 120/74 (22)  SpO2: 100 % (22) Vital Signs (24h Range):  Temp:  [97.8 °F (36.6 °C)] 97.8 °F (36.6 °C)  Pulse:  [81-92] 85  Resp:  [17-20] 18  SpO2:  [97 %-100 %] 100 %  BP: ()/(50-74) 120/74     Weight: 47.2 kg (104 lb)  Body mass index is 16.29 kg/m².    Physical Exam  Vitals and nursing note reviewed.   Constitutional:       Comments: Elderly female patient lying in stretcher, cooperative   HENT:      Head: Normocephalic and atraumatic.      Mouth/Throat:      Mouth: Mucous membranes are moist.   Eyes:      Comments: Wearing corrective lenses   Cardiovascular:      Rate and Rhythm: Normal rate and regular rhythm.      Comments: Warm peripheries, no LE edema  Pulmonary:      Effort: No respiratory distress.      Breath sounds: No stridor. No wheezing.   Abdominal:      General: Bowel sounds are normal.      Comments: Non distended, soft, healed surgical incision, colostomy left abdomen with semi solid output    Musculoskeletal:      Comments: OA changes   Skin:     General: Skin is warm and dry.      Comments: AVF- dressing, no active bleeding, LUE midline   Neurological:      Mental Status: She is alert and oriented to person, place, and time.      Comments: Speech intact, answering questions appropriately, moving all four extremities, non focal    Psychiatric:         Mood and Affect: Mood normal.           Significant Labs: Blood Culture: No results for input(s): LABBLOO in the last 48 hours.  BMP:   Recent Labs   Lab 11/16/22  1318   GLU 96      K 1.9*      CO2 28   BUN 5*   CREATININE 2.4*   CALCIUM 6.2*   MG 1.2*     CBC:   Recent Labs   Lab 11/16/22  1318   WBC 11.36   HGB 8.6*   HCT 27.5*        CMP:   Recent Labs   Lab 11/16/22  1318      K 1.9*      CO2 28   GLU 96   BUN 5*   CREATININE 2.4*   CALCIUM 6.2*   PROT 4.8*   ALBUMIN 2.2*   BILITOT 0.9   ALKPHOS 97   AST 15   ALT 10   ANIONGAP 10     Cardiac Markers:   Recent Labs   Lab 11/16/22  1318   *     Lactic Acid:   Recent Labs   Lab 11/16/22  1318 11/16/22  1727   LACTATE 2.2* 1.5     Magnesium:   Recent Labs   Lab 11/16/22  1318   MG 1.2*     POCT Glucose: No results for input(s): POCTGLUCOSE in the last 48 hours.  Respiratory Culture: No results for input(s): GSRESP, RESPIRATORYC in the last 48 hours.  Troponin:   Recent Labs   Lab 11/16/22  1318   TROPONINIHS 13.5     TSH:   Recent Labs   Lab 11/16/22  1318   TSH 2.820     Urine Culture: No results for input(s): LABURIN in the last 48 hours.  Urine Studies: No results for input(s): COLORU, APPEARANCEUA, PHUR, SPECGRAV, PROTEINUA, GLUCUA, KETONESU, BILIRUBINUA, OCCULTUA, NITRITE, UROBILINOGEN, LEUKOCYTESUR, RBCUA, WBCUA, BACTERIA, SQUAMEPITHEL, HYALINECASTS in the last 48 hours.    Invalid input(s): WRIGHTSUR    Significant Imaging: I have reviewed all pertinent imaging results/findings within the past 24 hours.    X-Ray Chest AP Portable    Result Date: 11/16/2022  XR  CHEST 1 VIEW CLINICAL HISTORY: 72 years Female Sepsis COMPARISON: September 26, 2022 FINDINGS: Cardiac silhouette size is within normal limits. Atherosclerotic calcification of the aorta. No confluent airspace disease. No large pleural effusion or pneumothorax. No acute osseous abnormality. Vascular stent or graft within the left upper arm. Right upper quadrant cholecystectomy clips. IMPRESSION: No acute pulmonary process. Electronically signed by:  Justino Rivera MD  11/16/2022 1:57 PM CST Workstation: [id number]    XR Ribs Min 3 views w/PA Chest Left    Result Date: 10/29/2022  PA chest with left RIBS CLINICAL DATA: Trauma FINDINGS: PA view of the chest is compared to September 2022. Heart size is upper normal. The aortic arch is calcified. Bilateral interstitial prominence is unchanged and likely on the basis of mild fibrosis. There are no pleural effusions. There is no evidence of pneumothorax. 3 images targeted to the left ribs demonstrate acute nondisplaced fractures of the left 11th rib posteriorly and posterolaterally. IMPRESSION: 1. Acute left 11th rib fracture. No pneumothorax or pleural effusion. 2. Chronic appearing mild bilateral abnormal interstitial prominence. Electronically signed by:  Dixon Thomas MD  10/29/2022 12:00 PM CDT Workstation: [id number]       Assessment/Plan:     Active Hospital Problems    Diagnosis    *Hypotension, acute on chronic    Hypokalemia    Hypomagnesemia    Lactic acidosis due to hypotension     QT prolongation    Ischemic colitis s/p laprotomy with ischemic sigmoid colon, sigmoid rectum stricture    ESRD on dialysis MWF    Bilateral LE neuropathy    Severe malnutrition    Hypothyroidism    Anemia of chronic disease    Hisotry of Vulvar cancer     Plan:  Admit inpatient, ICU, continuous cardiac telemetry monitoring  Continue Levophed, titrate for map greater than 65   Ordered additional 500 cc fluid bolus   Resume home midodrine 10 mg t.i.d.  Hold of  antibiotics given no definite source of infection, alternate etiology for systemic hypotension, cultures submitted, following  Ordered 10 mEq IV potassium and 40 mEq oral potassium supplementation  2 g IV magnesium supplementation  CT abdomen and pelvis evaluate given hypotension with recent surgery   Repeat CBC stat with type and screen given reported blood loss with dialysis, will transfuse for hemoglobin less than 7   Trend lactic acid q.4 hours until less than 2   Repeat limited echo given hypotension evaluate EF, previous with EF 70%   Renally dosing all medications and avoid nephrotoxic drugs   Fall and delirium precautions  A.m. labs ordered  Nephrology consulted   Further plan as per clinical course  Code status addressed with patient on admission, patient confirms DNR      VTE Risk Mitigation (From admission, onward)         Ordered     heparin (porcine) injection 5,000 Units  Every 8 hours         11/16/22 1845     IP VTE HIGH RISK PATIENT  Once         11/16/22 1845     Place sequential compression device  Until discontinued         11/16/22 1845                   Dana Ovalle MD  Department of Hospital Medicine   [facility] - Emergency Dept

## 2022-11-17 NOTE — PLAN OF CARE
CM attempted to complete discharge planning assessment however pt was sleeping and did not respond to the sound of my voice. Per pts nurse Macarena, she just received pain medication. CM called pts daughter Julieta Chang at [phone] and received a recording that she is not receiving calls at this time and no option to leave a voicemail. CM called second emergency contact Stephanie Aguiar at [phone] and left a detailed message for a return call. CM following to complete discharge planning assessment.    11/17/22 1322   Discharge Assessment   Assessment Type Discharge Planning Assessment

## 2022-11-17 NOTE — PLAN OF CARE
Problem: Parenteral Nutrition  Goal: Effective Intravenous Nutrition Therapy Delivery  Outcome: Ongoing, Progressing  Intervention: Optimize Intravenous Nutrition Delivery  Flowsheets (Taken 11/17/2022 1229)  Nutrition Support Management: parenteral nutrition initiated

## 2022-11-17 NOTE — ANESTHESIA PROCEDURE NOTES
Central Line    Diagnosis: Shock  Patient location during procedure: done in OR  Timeout: 11/16/2022 10:15 PM  Procedure end time: 11/16/2022 10:45 PM    Staffing  Authorizing Provider: Moreno Gaming MD  Performing Provider: Moreno Gaming MD    Staffing  Performed: anesthesiologist   Anesthesiologist: Moreno Gaming MD  Anesthesiologist was present at the time of the procedure.  Preanesthetic Checklist  Completed: patient identified, IV checked, site marked, risks and benefits discussed, surgical consent, monitors and equipment checked, pre-op evaluation, timeout performed and anesthesia consent given  Indication   Indication: hemodynamic monitoring, vascular access, med administration     Anesthesia   local infiltration    Central Line   Skin Prep: skin prepped with ChloraPrep, skin prep agent completely dried prior to procedure  Sterile Barriers Followed: Yes    All five maximal barriers used- gloves, gown, cap, mask, and large sterile sheet    hand hygiene performed prior to central venous catheter insertion  Location: left internal jugular.   Catheter type: triple lumen  Catheter Size: 7 Fr  Inserted Catheter Length: 20 cm  Ultrasound: vascular probe with ultrasound   Vessel Caliber: medium, patent, compressibility normal  Needle advanced into vessel with real time Ultrasound guidance.  Guidewire confirmed in vessel.  Image recorded and saved.  sterile gel and probe cover used in ultrasound-guided central venous catheter insertion  Manometry: none  Insertion Attempts: 1   Securement:line sutured, chlorhexidine patch, sterile dressing applied and blood return through all ports    Post-Procedure   X-Ray: placement verified by x-ray   Adverse Events:none      Guidewire Guidewire removed intact.

## 2022-11-17 NOTE — NURSING
Patient transferred from OR to Room 2205 successfully at approximately 0200 (11/16/2022). The patient is A and ) x 4 on room air. She has an A-line and a central line with levo infusing. I will continue to monitor the patient and assess for any changes.

## 2022-11-17 NOTE — CONSULTS
INPATIENT NEPHROLOGY CONSULT   [facility]    Patient Name: Shara Hutchins  Date: 11/17/2022    Reason for consultation: ESRD    Chief Complaint:   Chief Complaint   Patient presents with    Hypotension     Pt presents to the ER via EMS after being hypotensive at dialysis. Pt had systolic BP of 60 at dialysis.        History of Present Illness:  Ms. Hutchins is a 72-year-old female who was brought to the ED via EMS from outpatient dialysis center due to hypotension.  Patient with complicated recent history, about 2 months ago, admitted with ischemic colitis status post laparotomy with colostomy, stated she stayed in the hospital for about 1 month, she was discharge but had fall at assisted living facility and re-presented to the hospital following day, since then has had persistent hypotension, antihypertensives were discontinued, she was started on midodrine.  She was subsequently discharged to inpatient rehab and states she had returned to assisted living facility about 2 weeks ago.  Known history of ESRD on HD MWF.  She states blood pressure has been running low since previous admission, yesterday midodrine was increased from 5 mg t.i.d. to 10 mg t.i.d.. States her appetite has been low, over the last 24 hours has noticed decreased output from her colostomy, chronic nausea without any recent change, no significant increased abdominal pain.  States at dialysis today, near end of session, needle got dislodged from fistula, about 300 cc of blood loss, states following this she became lightheaded/dizzy, blood pressure 60/30 and she was referred to the ED.  States with dialysis recently they have not been removing fluid due to hypotension.  States she does have home health at assisted living facility.  In the ED blood pressure was low, 94/56, received fluid bolus, subsequently started on Levophed, during my encounter blood pressure 116/57 on Levophed 0.3 mcg/kg/min.  Patient is currently reporting back  pain.  Labs with WBC 11.36, hemoglobin 8.6, platelet 162, potassium 1.9, magnesium 1.2, albumin 2.2, lactic acid 2.2, BUN/creatinine 5/2.4.  EKG reviewed.  Chest x-ray clear.  Previous echo with EF of 70%.  She received acetaminophen, magnesium, potassium, 472 cc fluid bolus as well as cefepime in the ED. She was started on Levophed.  CTA abdomen and pelvis obtained with diffuse pneumatosis intestinal most prominent ascending, transverse and descending colon with portal vein gas.  She was started on broad-spectrum antibiotics.  General surgery was consulted and underwent emergent exploratory laparotomy with total colectomy, creation of ileostomy, inadvertent bladder injury requiring repair.  She was extubated postprocedure and transferred to the ICU.  Received 1 unit PRBC and FFP.  On 11/17 reports nausea, abdominal pain, still on Levophed. We are consulted for dialysis.    Interval History:  11/17- weaning pressors, stopped IVFs, can remove lee if ok with urology    Plan of Care:    Assessment:  Ischemic colitis s/p laprotomy with total colectomy and ileostomy, 11/17  ESRD on HD MWF  Septic shock  Hyponatremia  Hypokalemia  Lactic acidosis  SHPT  Anemia of CKD    Plan:    - continue septic shock management- doesn't need IVFs right now- can remove lee if ok with urology- dose antbx for CrCl < 10/HD  - ordered HD MWF  - decide on UF goal tomorrow  - agree with TPN  - adjust dialysate  - BMM at goal  - will give ADAM with HD    Thank you for allowing us to participate in this patient's care. We will continue to follow.    Vital Signs:  Temp Readings from Last 3 Encounters:   11/17/22 98.4 °F (36.9 °C)   10/29/22 98.5 °F (36.9 °C) (Oral)   09/27/22 97.4 °F (36.3 °C)       Pulse Readings from Last 3 Encounters:   11/17/22 87   10/29/22 91   09/27/22 74       BP Readings from Last 3 Encounters:   11/17/22 (!) 138/56   10/29/22 (!) 105/58   09/27/22 101/61       Weight:  Wt Readings from Last 3 Encounters:   11/17/22  47.2 kg (104 lb 0.9 oz)   11/17/22 47.2 kg (104 lb)   10/29/22 50.8 kg (112 lb)       Past Medical & Surgical History:  Past Medical History:   Diagnosis Date    Cellulitis of trunk     Perineum, numerous episodes    Chronic diarrhea 02/10/2020    Short-gut syndrome    End stage renal disease on dialysis 01/07/2018    Fatigue     Hypertension     Hypothyroidism 02/10/2020    Iron deficiency anemia due to chronic blood loss 01/07/2018    Pulmonary hypertension 03/20/2020    Vulvar cancer 01/07/2019       Past Surgical History:   Procedure Laterality Date    ABDOMINAL SURGERY      ANGIOGRAPHY OF ARTERIOVENOUS SHUNT N/A 01/19/2022    Procedure: Fistulogram with Possible Intervention;  Surgeon: Raz Maher MD;  Location: [facility] CATH/EP LAB;  Service: Cardiothoracic;  Laterality: N/A;    APPENDECTOMY      AV FISTULA PLACEMENT Left 2018    BACK SURGERY      BLADDER FULGURATION N/A 06/16/2020    Procedure: FULGURATION, BLADDER;  Surgeon: Damari Barber Jr., MD;  Location: [facility] OR;  Service: Urology;  Laterality: N/A;    BLADDER REPAIR  11/16/2022    Procedure: REPAIR, BLADDER;  Surgeon: Kannan Broderick MD;  Location: [facility];  Service: General;;    carpel tunnel surgery once on each hand      CERVICAL FUSION      x 4    cervix repair      CHOLECYSTECTOMY  2000    COLECTOMY      COLECTOMY, TOTAL  11/16/2022    Procedure: COLECTOMY, TOTAL;  Surgeon: Kannan Broderick MD;  Location: [facility];  Service: General;;    CYSTOSCOPY W/ RETROGRADES  06/16/2020    Procedure: CYSTOSCOPY, WITH RETROGRADE PYELOGRAM;  Surgeon: Damari Barber Jr., MD;  Location: [facility] OR;  Service: Urology;;    CYSTOSCOPY W/ RETROGRADES Right 04/20/2021    Procedure: CYSTOSCOPY, WITH RETROGRADE PYELOGRAM;  Surgeon: Damari Barber Jr., MD;  Location: [facility] OR;  Service: Urology;  Laterality: Right;    CYSTOSCOPY W/ URETERAL STENT PLACEMENT Right 03/16/2021    Procedure: CYSTOSCOPY, WITH URETERAL STENT INSERTION;  Surgeon: Damari Barber Jr., MD;  Location:  [facility] OR;  Service: Urology;  Laterality: Right;    CYSTOSCOPY W/ URETERAL STENT PLACEMENT Right 05/18/2021    Procedure: CYSTOSCOPY, WITH URETERAL STENT INSERTION;  Surgeon: Damari Barber Jr., MD;  Location: [facility] OR;  Service: Urology;  Laterality: Right;    CYSTOSCOPY W/ URETERAL STENT PLACEMENT Right 06/22/2022    Procedure: CYSTOSCOPY, WITH URETERAL STENT INSERTION;  Surgeon: Gaby Garcia MD;  Location: [facility] OR;  Service: Urology;  Laterality: Right;  cysto stent exchange    CYSTOSCOPY W/ URETERAL STENT REMOVAL Right 04/20/2021    Procedure: CYSTOSCOPY, WITH URETERAL STENT REMOVAL;  Surgeon: Damari Barber Jr., MD;  Location: [facility] OR;  Service: Urology;  Laterality: Right;    CYSTOSCOPY WITH URETEROSCOPY, RETROGRADE PYELOGRAPHY, AND INSERTION OF STENT Right 03/30/2021    Procedure: CYSTOSCOPY, WITH RETROGRADE PYELOGRAM AND URETERAL STENT INSERTION;  Surgeon: Damari Barber Jr., MD;  Location: [facility] OR;  Service: Urology;  Laterality: Right;    CYSTOURETEROSCOPY WITH RETROGRADE PYELOGRAPHY AND INSERTION OF STENT INTO URETER Right 01/18/2022    Procedure: CYSTOURETEROSCOPY, WITH RETROGRADE PYELOGRAM AND URETERAL STENT INSERTION;  Surgeon: Damari Barber Jr., MD;  Location: [facility];  Service: Urology;  Laterality: Right;    CYSTOURETEROSCOPY WITH RETROGRADE PYELOGRAPHY AND INSERTION OF STENT INTO URETER Right 8/30/2022    Procedure: CYSTOURETEROSCOPY, WITH RETROGRADE PYELOGRAM AND URETERAL STENT INSERTION;  Surgeon: Damari Barber Jr., MD;  Location: [facility] OR;  Service: Urology;  Laterality: Right;    DIAGNOSTIC LAPAROSCOPY N/A 02/13/2020    Procedure: LAPAROSCOPY, DIAGNOSTIC;  Surgeon: Robbi Lovell III, MD;  Location: [facility] OR;  Service: General;  Laterality: N/A;    HYSTERECTOMY  1980    ILEOSTOMY N/A 02/13/2020    Procedure: CREATION, ILEOSTOMY Loop;  Surgeon: Robbi Lovell III, MD;  Location: [facility] OR;  Service: General;  Laterality: N/A;    ILEOSTOMY CLOSURE N/A 05/18/2020    Procedure:  CLOSURE, ILEOSTOMY;  Surgeon: Robbi Lovell III, MD;  Location: [facility] OR;  Service: General;  Laterality: N/A;    LASER LITHOTRIPSY Right 03/30/2021    Procedure: LITHOTRIPSY, USING LASER;  Surgeon: Damari Barber Jr., MD;  Location: [facility] OR;  Service: Urology;  Laterality: Right;    LUMBAR LAMINECTOMY      LYSIS OF ADHESIONS N/A 02/13/2020    Procedure: LYSIS, ADHESIONS;  Surgeon: Robbi Lovell III, MD;  Location: [facility] OR;  Service: General;  Laterality: N/A;    NECK SURGERY      OSTEOTOMY  9/7/2022    Procedure: OSTEOTOMY;  Surgeon: Brigette Chavez MD;  Location: [facility] OR;  Service: General;;    PERCUTANEOUS TRANSLUMINAL ANGIOPLASTY OF ARTERIOVENOUS FISTULA N/A 01/19/2022    Procedure: PTA, AV FISTULA;  Surgeon: Raz Maher MD;  Location: [facility] CATH/EP LAB;  Service: Cardiothoracic;  Laterality: N/A;    PHLEBOGRAPHY  02/28/2020    Procedure: VENOGRAM;  Surgeon: Robbi Lovell III, MD;  Location: [facility] OR;  Service: General;;    REMOVAL OF VASCULAR ACCESS PORT Right 02/13/2020    Procedure: REMOVAL, VASCULAR ACCESS PORT;  Surgeon: Robbi Lovell III, MD;  Location: [facility] OR;  Service: General;  Laterality: Right;    RETROGRADE PYELOGRAPHY  03/16/2021    Procedure: PYELOGRAM, RETROGRADE;  Surgeon: Damari Barber Jr., MD;  Location: [facility] OR;  Service: Urology;;    RETROGRADE PYELOGRAPHY Right 05/18/2021    Procedure: PYELOGRAM, RETROGRADE;  Surgeon: Damari Barber Jr., MD;  Location: [facility] OR;  Service: Urology;  Laterality: Right;    RETROGRADE PYELOGRAPHY  06/22/2022    Procedure: PYELOGRAM, RETROGRADE;  Surgeon: Gaby Garcia MD;  Location: [facility] OR;  Service: Urology;;    SHOULDER SURGERY      TONSILLECTOMY      URETEROSCOPIC REMOVAL OF URETERIC CALCULUS Right 03/30/2021    Procedure: REMOVAL, CALCULUS, URETER, URETEROSCOPIC;  Surgeon: Damari Barber Jr., MD;  Location: [facility] OR;  Service: Urology;  Laterality: Right;    URETEROSCOPIC REMOVAL OF URETERIC CALCULUS Right 04/20/2021     Procedure: REMOVAL, CALCULUS, URETER, URETEROSCOPIC;  Surgeon: Damari Barber Jr., MD;  Location: [facility] OR;  Service: Urology;  Laterality: Right;    URETEROSCOPIC REMOVAL OF URETERIC CALCULUS Right 2022    Procedure: REMOVAL, CALCULUS, URETER, URETEROSCOPIC;  Surgeon: Damari Barber Jr., MD;  Location: [facility] OR;  Service: Urology;  Laterality: Right;    vulva cancer         Past Social History:  Social History     Socioeconomic History    Marital status:    Tobacco Use    Smoking status: Former     Packs/day: 1.00     Years: 33.00     Pack years: 33.00     Types: Cigarettes     Quit date: 2000     Years since quittin.8    Smokeless tobacco: Former     Quit date:    Substance and Sexual Activity    Alcohol use: No    Drug use: No     Social Determinants of Health     Financial Resource Strain: Low Risk     Difficulty of Paying Living Expenses: Not hard at all   Food Insecurity: No Food Insecurity    Worried About Running Out of Food in the Last Year: Never true    Ran Out of Food in the Last Year: Never true   Transportation Needs: No Transportation Needs    Lack of Transportation (Medical): No    Lack of Transportation (Non-Medical): No   Physical Activity: Sufficiently Active    Days of Exercise per Week: 7 days    Minutes of Exercise per Session: 60 min   Stress: No Stress Concern Present    Feeling of Stress : Not at all   Social Connections: Moderately Integrated    Frequency of Communication with Friends and Family: More than three times a week    Frequency of Social Gatherings with Friends and Family: More than three times a week    Attends Zoroastrian Services: More than 4 times per year    Active Member of Clubs or Organizations: Yes    Attends Club or Organization Meetings: More than 4 times per year    Marital Status:    Housing Stability: Low Risk     Unable to Pay for Housing in the Last Year: No    Number of Places Lived in the Last Year: 1    Unstable Housing in the Last  Year: No       Medications:  Scheduled Meds:   ceFEPime (MAXIPIME) IVPB  1 g Intravenous Q24H    chlorhexidine  15 mL Mouth/Throat BID    levothyroxine  88 mcg Oral Before breakfast    mupirocin   Nasal BID     Continuous Infusions:   NORepinephrine bitartrate-D5W 0.5 mcg/kg/min (11/17/22 0913)     PRN Meds:.acetaminophen, calcium gluconate IVPB, calcium gluconate IVPB, calcium gluconate IVPB, dextrose 10%, dextrose 10%, glucagon (human recombinant), glucose, glucose, magnesium sulfate IVPB, magnesium sulfate IVPB, morphine, naloxone, ondansetron, potassium chloride in water **AND** potassium chloride in water **AND** potassium chloride in water, prochlorperazine, senna-docusate 8.6-50 mg, sodium chloride 0.9%, sodium phosphate IVPB, sodium phosphate IVPB, sodium phosphate IVPB  Current Facility-Administered Medications on File Prior to Encounter   Medication Dose Route Frequency Provider Last Rate Last Admin    electrolyte-S (ISOLYTE)   Intravenous Continuous Raz Ruff MD        lactated ringers infusion   Intravenous Continuous Ruy Beltre MD   New Bag at 11/16/22 0203     Current Outpatient Medications on File Prior to Encounter   Medication Sig Dispense Refill    cholecalciferol, vitamin D3, (VITAMIN D3) 25 mcg (1,000 unit) capsule Take 1 capsule (1,000 Units total) by mouth once daily. 10 capsule 0    dronabinoL (MARINOL) 2.5 MG capsule Take 2.5 mg by mouth 2 (two) times daily before meals.      ergocalciferol (ERGOCALCIFEROL) 50,000 unit Cap Take 50,000 Units by mouth every 7 days. MONDAYS      ferric citrate (AURYXIA) 210 mg iron Tab Take 2 tablets by mouth 3 (three) times daily with meals.      gabapentin (NEURONTIN) 100 MG capsule Take 100 mg by mouth 2 (two) times daily.      hydrALAZINE (APRESOLINE) 50 MG tablet Take 1 tablet (50 mg total) by mouth every 8 (eight) hours. 270 tablet 0    hydrOXYzine HCL (ATARAX) 25 MG tablet Take 1 tablet (25 mg total) by mouth 3 (three) times daily as needed  for Itching.      levothyroxine (SYNTHROID) 88 MCG tablet TAKE 1 TABLET BEFORE BREAKFAST (Patient taking differently: Take 88 mcg by mouth before breakfast. TAKE 1 TABLET BEFORE BREAKFAST) 90 tablet 3    LORazepam (ATIVAN) 0.5 MG tablet Take 1 tablet (0.5 mg total) by mouth every evening. 90 tablet 1    midodrine (PROAMATINE) 5 MG Tab Take 5 mg by mouth 3 (three) times daily.      mirtazapine (REMERON) 15 MG tablet Take 7.5 mg by mouth every evening.      promethazine (PHENERGAN) 25 MG tablet TAKE 1 TABLET (25 MG TOTAL) BY MOUTH 2 (TWO) TIMES DAILY AS NEEDED FOR NAUSEA. 60 tablet 0    rOPINIRole (REQUIP) 0.5 MG tablet Take 0.5 mg by mouth every 8 (eight) hours.      sertraline (ZOLOFT) 50 MG tablet Take 1 tablet (50 mg total) by mouth every evening. Take 50 mg by mouth every evening. 90 tablet 1    simethicone (MYLICON) 80 MG chewable tablet Take 80 mg by mouth every 6 (six) hours as needed.      traMADoL (ULTRAM) 50 mg tablet Take 1 tablet by mouth 4 (four) times daily as needed.      zinc gluconate 50 mg tablet Take 50 mg by mouth once daily.      cholestyramine (QUESTRAN) 4 gram packet Take 4 g by mouth 2 (two) times daily.      diltiaZEM (CARDIZEM CD) 120 MG Cp24 Take 1 capsule (120 mg total) by mouth once daily. 90 capsule 3    ferrous sulfate (FEOSOL) 325 mg (65 mg iron) Tab tablet 325 mg.      oxyCODONE (OXY-IR) 5 mg Cap Take 5 mg by mouth.      oxyCODONE-acetaminophen (PERCOCET) 5-325 mg per tablet Take 1 tablet by mouth every 6 (six) hours as needed for Pain. 12 tablet 0    sevelamer carbonate (RENVELA) 800 mg Tab 800 mg.      zinc sulfate (ZINCATE) 50 mg zinc (220 mg) capsule Take 1 capsule by mouth once daily.      [DISCONTINUED] miconazole (MICOTIN) 2 % cream Apply topically 2 (two) times daily. 28 g 0       Allergies:  Ciprofloxacin and Pcn [penicillins]    Past Family History:  Reviewed; refer to Hospitalist Admission Note    Review of Systems:  Review of Systems - All 14 systems reviewed and negative,  except as noted in HPI    Physical Exam:  General Appearance:    NAD, AAO x 3, cooperative, appears stated age   Head:    Normocephalic, atraumatic   Eyes:    PER, EOMI, and conjunctiva/sclera clear bilaterally       Mouth:   Moist mucus membranes, no thrush or oral lesions,       normal dentition   Back:     No CVA tenderness   Lungs:     Clear to auscultation bilaterally, no wheezes, crackles,           rales or rhonchi, symmetric air movement, respirations unlabored   Chest wall:    No tenderness or deformity   Heart:    Regular rate and rhythm, S1 and S2 normal, no murmur, rub   or gallop   Abdomen:     Soft, tender, non-distended, bowel sounds active all four   quadrants, no RT or guarding, no masses, no organomegaly   Extremities:   Warm and well perfused, distal pulses are intact, no             cyanosis or peripheral edema   MSK:   No joint or muscle swelling, tenderness or deformity   Skin:   Skin color, texture, turgor normal, no rashes or lesions   Neurologic/Psychiatric:   CNII-XII intact, normal strength and sensation       throughout, no asterixis; normal affect, memory, judgement     and insight      Results:  Lab Results   Component Value Date     (L) 11/17/2022    K 3.9 11/17/2022    CL 95 11/17/2022    CO2 23 11/17/2022    BUN 10 11/17/2022    CREATININE 3.3 (H) 11/17/2022    CALCIUM 7.3 (L) 11/17/2022    ANIONGAP 8 11/17/2022    ESTGFRAFRICA 12.5 (A) 07/20/2022    EGFRNONAA 10.9 (A) 07/20/2022       Lab Results   Component Value Date    CALCIUM 7.3 (L) 11/17/2022    PHOS 3.2 09/22/2022       Recent Labs   Lab 11/17/22  0839   WBC 13.12*   RBC 3.02*   HGB 9.9*   HCT 31.1*      *   MCH 32.8*   MCHC 31.8*       I have personally reviewed pertinent radiological imaging and reports.    I have spent > 70 minutes providing care for this patient for the above diagnoses. These services have included chart/data/imaging review, evaluation, exam, formulation of plan, , note  preparation, and discussions with staff involved in this patient's care.    Yasmin Parker MD MPH  [facility] [address] [address]  [phone] (p)  [phone] (f)

## 2022-11-17 NOTE — CARE UPDATE
CM attempted to do discharge planning assessment at bedside; pt still sleeping. CM contacted daughter Julieta Chang [phone] and voice recording states she's not receiving calls. CM will continue to follow.

## 2022-11-17 NOTE — CONSULTS
[facility] - Emergency Dept  General Surgery  Consult Note    Inpatient consult to General Surgery  Consult performed by: Kannan Broderick MD  Consult ordered by: Dana Ovalle MD      Inpatient consult to General Surgery  Consult performed by: Kannan Broderick MD  Consult ordered by: Maryana Morgan MD      Subjective:     Chief Complaint/Reason for Admission:  Probable ischemic colitis with peritonitis    History of Present Illness:  This is a 72-year-old female with many abdominal surgeries previously.  She is a history of vulvar cancer that was treated with radiation.  It sounds like she had a stricture of the sigmoid colon most recently that was resected.  She was given an end colostomy.  She is end-stage renal disease on dialysis with chronic hypotension.  For the past few days, she is been fairly hypotensive.  She is a history of recent fall.  Over the past few days, she is had increasing abdominal pain.  This became acutely worse today and is diffuse.  She is had other prior small-bowel resections and ileostomy as well as colon resections for radiation induced changes.  She presented with peritonitis and CT imaging that showed pneumatosis of the colon and portal venous gas in the left liver.    Current Facility-Administered Medications on File Prior to Encounter   Medication    electrolyte-S (ISOLYTE)    lactated ringers infusion     Current Outpatient Medications on File Prior to Encounter   Medication Sig    cholecalciferol, vitamin D3, (VITAMIN D3) 25 mcg (1,000 unit) capsule Take 1 capsule (1,000 Units total) by mouth once daily.    dronabinoL (MARINOL) 2.5 MG capsule Take 2.5 mg by mouth 2 (two) times daily before meals.    ergocalciferol (ERGOCALCIFEROL) 50,000 unit Cap Take 50,000 Units by mouth every 7 days. MONDAYS    ferric citrate (AURYXIA) 210 mg iron Tab Take 2 tablets by mouth 3 (three) times daily with meals.    gabapentin (NEURONTIN) 100 MG capsule Take 100 mg by mouth 2  (two) times daily.    hydrALAZINE (APRESOLINE) 50 MG tablet Take 1 tablet (50 mg total) by mouth every 8 (eight) hours.    hydrOXYzine HCL (ATARAX) 25 MG tablet Take 1 tablet (25 mg total) by mouth 3 (three) times daily as needed for Itching.    levothyroxine (SYNTHROID) 88 MCG tablet TAKE 1 TABLET BEFORE BREAKFAST (Patient taking differently: Take 88 mcg by mouth before breakfast. TAKE 1 TABLET BEFORE BREAKFAST)    LORazepam (ATIVAN) 0.5 MG tablet Take 1 tablet (0.5 mg total) by mouth every evening.    midodrine (PROAMATINE) 5 MG Tab Take 5 mg by mouth 3 (three) times daily.    mirtazapine (REMERON) 15 MG tablet Take 7.5 mg by mouth every evening.    promethazine (PHENERGAN) 25 MG tablet TAKE 1 TABLET (25 MG TOTAL) BY MOUTH 2 (TWO) TIMES DAILY AS NEEDED FOR NAUSEA.    rOPINIRole (REQUIP) 0.5 MG tablet Take 0.5 mg by mouth every 8 (eight) hours.    sertraline (ZOLOFT) 50 MG tablet Take 1 tablet (50 mg total) by mouth every evening. Take 50 mg by mouth every evening.    simethicone (MYLICON) 80 MG chewable tablet Take 80 mg by mouth every 6 (six) hours as needed.    traMADoL (ULTRAM) 50 mg tablet Take 1 tablet by mouth 4 (four) times daily as needed.    zinc gluconate 50 mg tablet Take 50 mg by mouth once daily.    cholestyramine (QUESTRAN) 4 gram packet Take 4 g by mouth 2 (two) times daily.    diltiaZEM (CARDIZEM CD) 120 MG Cp24 Take 1 capsule (120 mg total) by mouth once daily.    ferrous sulfate (FEOSOL) 325 mg (65 mg iron) Tab tablet 325 mg.    oxyCODONE (OXY-IR) 5 mg Cap Take 5 mg by mouth.    oxyCODONE-acetaminophen (PERCOCET) 5-325 mg per tablet Take 1 tablet by mouth every 6 (six) hours as needed for Pain.    sevelamer carbonate (RENVELA) 800 mg Tab 800 mg.    zinc sulfate (ZINCATE) 50 mg zinc (220 mg) capsule Take 1 capsule by mouth once daily.    [DISCONTINUED] miconazole (MICOTIN) 2 % cream Apply topically 2 (two) times daily.       Review of patient's allergies indicates:   Allergen Reactions     Ciprofloxacin Other (See Comments)     Elevated liver enzymes      Pcn [penicillins] Anaphylaxis     TOLERATES ROCEPHIN WITHOUT DIFFICULTY       Past Medical History:   Diagnosis Date    Cellulitis of trunk     Perineum, numerous episodes    Chronic diarrhea 02/10/2020    Short-gut syndrome    End stage renal disease on dialysis 01/07/2018    Fatigue     Hypertension     Hypothyroidism 02/10/2020    Iron deficiency anemia due to chronic blood loss 01/07/2018    Pulmonary hypertension 03/20/2020    Vulvar cancer 01/07/2019     Past Surgical History:   Procedure Laterality Date    ABDOMINAL SURGERY      ANGIOGRAPHY OF ARTERIOVENOUS SHUNT N/A 01/19/2022    Procedure: Fistulogram with Possible Intervention;  Surgeon: Raz Maher MD;  Location: [facility] CATH/EP LAB;  Service: Cardiothoracic;  Laterality: N/A;    APPENDECTOMY      AV FISTULA PLACEMENT Left 2018    BACK SURGERY      BLADDER FULGURATION N/A 06/16/2020    Procedure: FULGURATION, BLADDER;  Surgeon: Damari aBrber Jr., MD;  Location: [facility] OR;  Service: Urology;  Laterality: N/A;    carpel tunnel surgery once on each hand      CERVICAL FUSION      x 4    cervix repair      CHOLECYSTECTOMY  2000    COLECTOMY      CYSTOSCOPY W/ RETROGRADES  06/16/2020    Procedure: CYSTOSCOPY, WITH RETROGRADE PYELOGRAM;  Surgeon: Damari Barber Jr., MD;  Location: [facility] OR;  Service: Urology;;    CYSTOSCOPY W/ RETROGRADES Right 04/20/2021    Procedure: CYSTOSCOPY, WITH RETROGRADE PYELOGRAM;  Surgeon: Damari Barber Jr., MD;  Location: [facility];  Service: Urology;  Laterality: Right;    CYSTOSCOPY W/ URETERAL STENT PLACEMENT Right 03/16/2021    Procedure: CYSTOSCOPY, WITH URETERAL STENT INSERTION;  Surgeon: Damari Barber Jr., MD;  Location: [facility] OR;  Service: Urology;  Laterality: Right;    CYSTOSCOPY W/ URETERAL STENT PLACEMENT Right 05/18/2021    Procedure: CYSTOSCOPY, WITH URETERAL STENT INSERTION;  Surgeon: Damari Barber Jr., MD;  Location: [facility] OR;  Service: Urology;   Laterality: Right;    CYSTOSCOPY W/ URETERAL STENT PLACEMENT Right 06/22/2022    Procedure: CYSTOSCOPY, WITH URETERAL STENT INSERTION;  Surgeon: Gaby Garcia MD;  Location: [facility];  Service: Urology;  Laterality: Right;  cysto stent exchange    CYSTOSCOPY W/ URETERAL STENT REMOVAL Right 04/20/2021    Procedure: CYSTOSCOPY, WITH URETERAL STENT REMOVAL;  Surgeon: Damari Barber Jr., MD;  Location: [facility] OR;  Service: Urology;  Laterality: Right;    CYSTOSCOPY WITH URETEROSCOPY, RETROGRADE PYELOGRAPHY, AND INSERTION OF STENT Right 03/30/2021    Procedure: CYSTOSCOPY, WITH RETROGRADE PYELOGRAM AND URETERAL STENT INSERTION;  Surgeon: Damari Barber Jr., MD;  Location: [facility];  Service: Urology;  Laterality: Right;    CYSTOURETEROSCOPY WITH RETROGRADE PYELOGRAPHY AND INSERTION OF STENT INTO URETER Right 01/18/2022    Procedure: CYSTOURETEROSCOPY, WITH RETROGRADE PYELOGRAM AND URETERAL STENT INSERTION;  Surgeon: Damari Barber Jr., MD;  Location: [facility];  Service: Urology;  Laterality: Right;    CYSTOURETEROSCOPY WITH RETROGRADE PYELOGRAPHY AND INSERTION OF STENT INTO URETER Right 8/30/2022    Procedure: CYSTOURETEROSCOPY, WITH RETROGRADE PYELOGRAM AND URETERAL STENT INSERTION;  Surgeon: Damari Barber Jr., MD;  Location: [facility];  Service: Urology;  Laterality: Right;    DIAGNOSTIC LAPAROSCOPY N/A 02/13/2020    Procedure: LAPAROSCOPY, DIAGNOSTIC;  Surgeon: Robbi Lovell III, MD;  Location: [facility];  Service: General;  Laterality: N/A;    HYSTERECTOMY  1980    ILEOSTOMY N/A 02/13/2020    Procedure: CREATION, ILEOSTOMY Loop;  Surgeon: Robbi Lovell III, MD;  Location: [facility] OR;  Service: General;  Laterality: N/A;    ILEOSTOMY CLOSURE N/A 05/18/2020    Procedure: CLOSURE, ILEOSTOMY;  Surgeon: Robbi Lovell III, MD;  Location: [facility] OR;  Service: General;  Laterality: N/A;    LASER LITHOTRIPSY Right 03/30/2021    Procedure: LITHOTRIPSY, USING LASER;  Surgeon: Damari Barber Jr., MD;  Location: [facility]  OR;  Service: Urology;  Laterality: Right;    LUMBAR LAMINECTOMY      LYSIS OF ADHESIONS N/A 02/13/2020    Procedure: LYSIS, ADHESIONS;  Surgeon: Robbi Lovell III, MD;  Location: [facility] OR;  Service: General;  Laterality: N/A;    NECK SURGERY      OSTEOTOMY  9/7/2022    Procedure: OSTEOTOMY;  Surgeon: Brigette Chavez MD;  Location: [facility] OR;  Service: General;;    PERCUTANEOUS TRANSLUMINAL ANGIOPLASTY OF ARTERIOVENOUS FISTULA N/A 01/19/2022    Procedure: PTA, AV FISTULA;  Surgeon: Raz Maher MD;  Location: [facility] CATH/EP LAB;  Service: Cardiothoracic;  Laterality: N/A;    PHLEBOGRAPHY  02/28/2020    Procedure: VENOGRAM;  Surgeon: Robbi Lovell III, MD;  Location: [facility] OR;  Service: General;;    REMOVAL OF VASCULAR ACCESS PORT Right 02/13/2020    Procedure: REMOVAL, VASCULAR ACCESS PORT;  Surgeon: Robbi Lovell III, MD;  Location: [facility] OR;  Service: General;  Laterality: Right;    RETROGRADE PYELOGRAPHY  03/16/2021    Procedure: PYELOGRAM, RETROGRADE;  Surgeon: Damari Babrer Jr., MD;  Location: [facility] OR;  Service: Urology;;    RETROGRADE PYELOGRAPHY Right 05/18/2021    Procedure: PYELOGRAM, RETROGRADE;  Surgeon: Damari Barber Jr., MD;  Location: [facility] OR;  Service: Urology;  Laterality: Right;    RETROGRADE PYELOGRAPHY  06/22/2022    Procedure: PYELOGRAM, RETROGRADE;  Surgeon: Gaby Gracia MD;  Location: [facility] OR;  Service: Urology;;    SHOULDER SURGERY      TONSILLECTOMY      URETEROSCOPIC REMOVAL OF URETERIC CALCULUS Right 03/30/2021    Procedure: REMOVAL, CALCULUS, URETER, URETEROSCOPIC;  Surgeon: Damari Barber Jr., MD;  Location: [facility] OR;  Service: Urology;  Laterality: Right;    URETEROSCOPIC REMOVAL OF URETERIC CALCULUS Right 04/20/2021    Procedure: REMOVAL, CALCULUS, URETER, URETEROSCOPIC;  Surgeon: Damari Barber Jr., MD;  Location: [facility] OR;  Service: Urology;  Laterality: Right;    URETEROSCOPIC REMOVAL OF URETERIC CALCULUS Right 8/30/2022    Procedure: REMOVAL, CALCULUS,  URETER, URETEROSCOPIC;  Surgeon: Damari Barber Jr., MD;  Location: [facility];  Service: Urology;  Laterality: Right;    vulva cancer       Family History       Problem Relation (Age of Onset)    Cancer Mother    Heart disease Mother, Father, Sister    Hypertension Mother    No Known Problems Daughter          Tobacco Use    Smoking status: Former     Packs/day: 1.00     Years: 33.00     Pack years: 33.00     Types: Cigarettes     Quit date: 2000     Years since quittin.8    Smokeless tobacco: Former     Quit date:    Substance and Sexual Activity    Alcohol use: No    Drug use: No    Sexual activity: Not on file     Review of Systems   Constitutional:  Negative for fever.   HENT: Negative.     Eyes: Negative.    Respiratory: Negative.     Cardiovascular: Negative.    Gastrointestinal:  Positive for abdominal pain.   Endocrine: Negative.    Genitourinary: Negative.    Musculoskeletal: Negative.    Skin: Negative.    Allergic/Immunologic: Negative.    Neurological: Negative.    Hematological: Negative.    Psychiatric/Behavioral: Negative.     Objective:     Vital Signs (Most Recent):  Temp: 97.8 °F (36.6 °C) (22 1306)  Pulse: 83 (22)  Resp: (!) 24 (22)  BP: 112/83 (22)  SpO2: 96 % (22)   Vital Signs (24h Range):  Temp:  [97.8 °F (36.6 °C)] 97.8 °F (36.6 °C)  Pulse:  [78-92] 83  Resp:  [13-24] 24  SpO2:  [93 %-100 %] 96 %  BP: ()/(50-83) 112/83     Weight: 47.2 kg (104 lb)  Body mass index is 16.29 kg/m².      Intake/Output Summary (Last 24 hours) at 2022  Last data filed at 2022  Gross per 24 hour   Intake 972 ml   Output --   Net 972 ml       Physical Exam  Constitutional:       General: She is not in acute distress.     Appearance: Normal appearance. She is not ill-appearing, toxic-appearing or diaphoretic.   HENT:      Head: Normocephalic.      Nose: Nose normal.   Eyes:      Conjunctiva/sclera: Conjunctivae normal.    Cardiovascular:      Rate and Rhythm: Normal rate and regular rhythm.   Pulmonary:      Effort: Pulmonary effort is normal.   Abdominal:      Comments: Voluntary guarding.  The abdomen is rigid.  This is consistent with peritonitis.   Musculoskeletal:         General: Normal range of motion.      Cervical back: Normal range of motion.   Skin:     General: Skin is warm.   Neurological:      General: No focal deficit present.      Mental Status: She is alert.   Psychiatric:         Mood and Affect: Mood normal.       Significant Labs:  Cardiac markers: No results for input(s): CKMB, CPKMB, TROPONINT, TROPONINI, MYOGLOBIN in the last 48 hours.  CBC:   Recent Labs   Lab 11/16/22 2056   WBC 16.66*   RBC 2.55*   HGB 8.8*   HCT 28.1*      *   MCH 34.5*   MCHC 31.3*     CMP:   Recent Labs   Lab 11/16/22  1318   GLU 96   CALCIUM 6.2*   ALBUMIN 2.2*   PROT 4.8*      K 1.9*   CO2 28      BUN 5*   CREATININE 2.4*   ALKPHOS 97   ALT 10   AST 15   BILITOT 0.9     Coagulation:   Recent Labs   Lab 11/16/22 2056   INR 2.0     Lactic Acid:   Recent Labs   Lab 11/16/22  1727   LACTATE 1.5       Significant Diagnostics:  CT scan has been reviewed.  There is portal venous gas.  Concerns pneumatosis throughout the colon.    Assessment/Plan:     Active Diagnoses:    Diagnosis Date Noted POA    PRINCIPAL PROBLEM:  Hypotension, acute on chronic [I95.9] 11/16/2022 Yes    Hypokalemia [E87.6] 11/16/2022 Yes    QT prolongation [R94.31] 11/16/2022 Yes    Lactic acidosis due to hypotension  [E87.20] 11/16/2022 Yes    Ischemic colitis s/p laprotomy with ischemic sigmoid colon, sigmoid rectum stricture [K55.9] 09/07/2022 Yes    Hypomagnesemia [E83.42] 05/18/2021 Yes    ESRD on dialysis MWF [N18.6, Z99.2] 03/15/2021 Not Applicable     Chronic    Bilateral LE neuropathy [G62.9] 03/15/2021 Yes     Chronic    Severe malnutrition [E43] 02/24/2020 Yes    Hypothyroidism [E03.9] 02/10/2020 Yes     Chronic    Anemia of chronic  disease [D63.8] 02/10/2020 Yes     Chronic    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  Most recently related to sigmoid colitis.  She has peritonitis on exam.  CT scan is concerning for ischemic colitis with portal venous gas and pneumatosis in the colon.      Discussed conservative management with aggressive fluid resuscitation to see if her hypotension improves verses operative exploration.  Given her peritonitis, I favored exploration as untreated ischemic colitis has a high risk of mortality.  Patient did agree and consent to the planned procedure.      To the OR for exploration.  Plan for total colectomy with end ileostomy.      Thank you for your consult. I will follow-up with patient. Please contact us if you have any additional questions.    Kannan Broderick MD  General Surgery  [facility] - Emergency Dept

## 2022-11-17 NOTE — ANESTHESIA PREPROCEDURE EVALUATION
11/16/2022  Shara Hutchins is a 72 y.o., female.  Patient Active Problem List   Diagnosis    Vitamin D deficiency    Hisotry of Vulvar cancer    Anemia of decreased vitamin B12 absorption    Anemia due to stage 5 chronic kidney disease treated with erythropoietin    Hypothyroidism    Anemia of chronic disease    Nausea & vomiting    Chronic diarrhea    DARCI (acute kidney injury)    Severe dehydration due to chronic diarrhea/high output ileostomy and reversed    Electrolyte imbalance    Severe malnutrition    History of difficult venous access    Malnutrition    Acute hypoxemic respiratory failure    Status post reversal of ileostomy    Chronic diarrhea    Mild protein-energy malnutrition    Perineal rash, female    Perianal cellulitis    Pneumonia due to infectious organism    Right hydroureter secondary to obstructing calculi    Right-sided pyelonephritis    ESRD on dialysis MWF    Macrocytic anemia    Constipation    Bilateral LE neuropathy    Essential hypertension    Nephrolithiasis    Sepsis    Hypomagnesemia    Acute on chronic anemia    Vulvar cellulitis    Lymphedema due to radiation    Urinary incontinence    S/P ureteral stent placement    Thrombocytopenia    Secondary hyperparathyroidism of renal origin    Stenosis of other vascular prosthetic devices, implants and grafts, initial encounter    History of cancer of vulva    Vitamin B12 deficiency    Chest pain    Emphysema lung    Ureterolithiasis    COVID    Ischemic colitis s/p laprotomy with ischemic sigmoid colon, sigmoid rectum stricture    Fall    Hypotension, acute on chronic    Hypokalemia    QT prolongation    Lactic acidosis due to hypotension                                                                                                                   11/16/2022  Shara Hutchins is a 72  y.o., female   Patient Active Problem List   Diagnosis    Vitamin D deficiency    Hisotry of Vulvar cancer    Anemia of decreased vitamin B12 absorption    Anemia due to stage 5 chronic kidney disease treated with erythropoietin    Hypothyroidism    Anemia of chronic disease    Nausea & vomiting    Chronic diarrhea    DARCI (acute kidney injury)    Severe dehydration due to chronic diarrhea/high output ileostomy and reversed    Electrolyte imbalance    Severe malnutrition    History of difficult venous access    Malnutrition    Acute hypoxemic respiratory failure    Status post reversal of ileostomy    Chronic diarrhea    Mild protein-energy malnutrition    Perineal rash, female    Perianal cellulitis    Pneumonia due to infectious organism    Right hydroureter secondary to obstructing calculi    Right-sided pyelonephritis    ESRD on dialysis MWF    Macrocytic anemia    Constipation    Bilateral LE neuropathy    Essential hypertension    Nephrolithiasis    Sepsis    Hypomagnesemia    Acute on chronic anemia    Vulvar cellulitis    Lymphedema due to radiation    Urinary incontinence    S/P ureteral stent placement    Thrombocytopenia    Secondary hyperparathyroidism of renal origin    Stenosis of other vascular prosthetic devices, implants and grafts, initial encounter    History of cancer of vulva    Vitamin B12 deficiency    Chest pain    Emphysema lung    Ureterolithiasis    COVID    Ischemic colitis s/p laprotomy with ischemic sigmoid colon, sigmoid rectum stricture    Fall    Hypotension, acute on chronic    Hypokalemia    QT prolongation    Lactic acidosis due to hypotension        Past Surgical History:   Procedure Laterality Date    ABDOMINAL SURGERY      ANGIOGRAPHY OF ARTERIOVENOUS SHUNT N/A 01/19/2022    Procedure: Fistulogram with Possible Intervention;  Surgeon: Raz Maher MD;  Location: [facility] CATH/EP LAB;  Service: Cardiothoracic;  Laterality: N/A;     APPENDECTOMY      AV FISTULA PLACEMENT Left 2018    BACK SURGERY      BLADDER FULGURATION N/A 06/16/2020    Procedure: FULGURATION, BLADDER;  Surgeon: Damari Barber Jr., MD;  Location: [facility] OR;  Service: Urology;  Laterality: N/A;    carpel tunnel surgery once on each hand      CERVICAL FUSION      x 4    cervix repair      CHOLECYSTECTOMY  2000    COLECTOMY      CYSTOSCOPY W/ RETROGRADES  06/16/2020    Procedure: CYSTOSCOPY, WITH RETROGRADE PYELOGRAM;  Surgeon: Damari Barber Jr., MD;  Location: [facility] OR;  Service: Urology;;    CYSTOSCOPY W/ RETROGRADES Right 04/20/2021    Procedure: CYSTOSCOPY, WITH RETROGRADE PYELOGRAM;  Surgeon: Damari Barber Jr., MD;  Location: [facility] OR;  Service: Urology;  Laterality: Right;    CYSTOSCOPY W/ URETERAL STENT PLACEMENT Right 03/16/2021    Procedure: CYSTOSCOPY, WITH URETERAL STENT INSERTION;  Surgeon: Damari Barber Jr., MD;  Location: [facility] OR;  Service: Urology;  Laterality: Right;    CYSTOSCOPY W/ URETERAL STENT PLACEMENT Right 05/18/2021    Procedure: CYSTOSCOPY, WITH URETERAL STENT INSERTION;  Surgeon: Damari Barber Jr., MD;  Location: [facility] OR;  Service: Urology;  Laterality: Right;    CYSTOSCOPY W/ URETERAL STENT PLACEMENT Right 06/22/2022    Procedure: CYSTOSCOPY, WITH URETERAL STENT INSERTION;  Surgeon: Gaby Garcia MD;  Location: [facility] OR;  Service: Urology;  Laterality: Right;  cysto stent exchange    CYSTOSCOPY W/ URETERAL STENT REMOVAL Right 04/20/2021    Procedure: CYSTOSCOPY, WITH URETERAL STENT REMOVAL;  Surgeon: Damari Barber Jr., MD;  Location: [facility] OR;  Service: Urology;  Laterality: Right;    CYSTOSCOPY WITH URETEROSCOPY, RETROGRADE PYELOGRAPHY, AND INSERTION OF STENT Right 03/30/2021    Procedure: CYSTOSCOPY, WITH RETROGRADE PYELOGRAM AND URETERAL STENT INSERTION;  Surgeon: Damari Barber Jr., MD;  Location: [facility] OR;  Service: Urology;  Laterality: Right;    CYSTOURETEROSCOPY WITH RETROGRADE PYELOGRAPHY AND INSERTION OF STENT  INTO URETER Right 01/18/2022    Procedure: CYSTOURETEROSCOPY, WITH RETROGRADE PYELOGRAM AND URETERAL STENT INSERTION;  Surgeon: Damari Barber Jr., MD;  Location: [facility] OR;  Service: Urology;  Laterality: Right;    CYSTOURETEROSCOPY WITH RETROGRADE PYELOGRAPHY AND INSERTION OF STENT INTO URETER Right 8/30/2022    Procedure: CYSTOURETEROSCOPY, WITH RETROGRADE PYELOGRAM AND URETERAL STENT INSERTION;  Surgeon: Damari Barber Jr., MD;  Location: [facility] OR;  Service: Urology;  Laterality: Right;    DIAGNOSTIC LAPAROSCOPY N/A 02/13/2020    Procedure: LAPAROSCOPY, DIAGNOSTIC;  Surgeon: Robbi Lovell III, MD;  Location: [facility] OR;  Service: General;  Laterality: N/A;    HYSTERECTOMY  1980    ILEOSTOMY N/A 02/13/2020    Procedure: CREATION, ILEOSTOMY Loop;  Surgeon: Robbi Lovell III, MD;  Location: [facility] OR;  Service: General;  Laterality: N/A;    ILEOSTOMY CLOSURE N/A 05/18/2020    Procedure: CLOSURE, ILEOSTOMY;  Surgeon: Robbi Lovell III, MD;  Location: [facility] OR;  Service: General;  Laterality: N/A;    LASER LITHOTRIPSY Right 03/30/2021    Procedure: LITHOTRIPSY, USING LASER;  Surgeon: Damari Barber Jr., MD;  Location: [facility] OR;  Service: Urology;  Laterality: Right;    LUMBAR LAMINECTOMY      LYSIS OF ADHESIONS N/A 02/13/2020    Procedure: LYSIS, ADHESIONS;  Surgeon: Robbi Lovell III, MD;  Location: [facility] OR;  Service: General;  Laterality: N/A;    NECK SURGERY      OSTEOTOMY  9/7/2022    Procedure: OSTEOTOMY;  Surgeon: Brigette Chavez MD;  Location: [facility] OR;  Service: General;;    PERCUTANEOUS TRANSLUMINAL ANGIOPLASTY OF ARTERIOVENOUS FISTULA N/A 01/19/2022    Procedure: PTA, AV FISTULA;  Surgeon: Raz Maher MD;  Location: [facility] CATH/EP LAB;  Service: Cardiothoracic;  Laterality: N/A;    PHLEBOGRAPHY  02/28/2020    Procedure: VENOGRAM;  Surgeon: Robbi Lovell III, MD;  Location: [facility] OR;  Service: General;;    REMOVAL OF VASCULAR ACCESS PORT Right 02/13/2020    Procedure: REMOVAL,  VASCULAR ACCESS PORT;  Surgeon: Robbi Lovell III, MD;  Location: [facility] OR;  Service: General;  Laterality: Right;    RETROGRADE PYELOGRAPHY  03/16/2021    Procedure: PYELOGRAM, RETROGRADE;  Surgeon: Damari Barber Jr., MD;  Location: [facility] OR;  Service: Urology;;    RETROGRADE PYELOGRAPHY Right 05/18/2021    Procedure: PYELOGRAM, RETROGRADE;  Surgeon: Damari Barber Jr., MD;  Location: [facility] OR;  Service: Urology;  Laterality: Right;    RETROGRADE PYELOGRAPHY  06/22/2022    Procedure: PYELOGRAM, RETROGRADE;  Surgeon: Gaby Garcia MD;  Location: [facility] OR;  Service: Urology;;    SHOULDER SURGERY      TONSILLECTOMY      URETEROSCOPIC REMOVAL OF URETERIC CALCULUS Right 03/30/2021    Procedure: REMOVAL, CALCULUS, URETER, URETEROSCOPIC;  Surgeon: Damari Barber Jr., MD;  Location: [facility] OR;  Service: Urology;  Laterality: Right;    URETEROSCOPIC REMOVAL OF URETERIC CALCULUS Right 04/20/2021    Procedure: REMOVAL, CALCULUS, URETER, URETEROSCOPIC;  Surgeon: Damari Barber Jr., MD;  Location: [facility] OR;  Service: Urology;  Laterality: Right;    URETEROSCOPIC REMOVAL OF URETERIC CALCULUS Right 8/30/2022    Procedure: REMOVAL, CALCULUS, URETER, URETEROSCOPIC;  Surgeon: Damari Barber Jr., MD;  Location: [facility];  Service: Urology;  Laterality: Right;    vulva cancer          Tobacco Use:  The patient  reports that she quit smoking about 22 years ago. Her smoking use included cigarettes. She has a 33.00 pack-year smoking history. She quit smokeless tobacco use about 22 years ago.     Results for orders placed or performed during the hospital encounter of 11/16/22   EKG 12-lead    Collection Time: 11/16/22  1:13 PM    Narrative    Test Reason : I95.9,    Vent. Rate : 087 BPM     Atrial Rate : 087 BPM     P-R Int : 148 ms          QRS Dur : 084 ms      QT Int : 420 ms       P-R-T Axes : 076 068 253 degrees     QTc Int : 505 ms    Sinus rhythm with Premature atrial complexes  ST and T wave abnormality, consider  inferior ischemia  ST and T wave abnormality, consider anterolateral ischemia  Abnormal ECG  When compared with ECG of 15-SEP-2022 10:24,  Non-specific change in ST segment in Lateral leads  T wave inversion now evident in Inferior leads  T wave inversion more evident in Anterior-lateral leads  QT has lengthened    Referred By: [email or web address]   SELF           Confirmed By:              Lab Results   Component Value Date    WBC 16.66 (H) 11/16/2022    HGB 8.8 (L) 11/16/2022    HCT 28.1 (L) 11/16/2022     (H) 11/16/2022     11/16/2022     BMP  Lab Results   Component Value Date     11/16/2022    K 1.9 (LL) 11/16/2022     11/16/2022    CO2 28 11/16/2022    BUN 5 (L) 11/16/2022    CREATININE 2.4 (H) 11/16/2022    CALCIUM 6.2 (LL) 11/16/2022    ANIONGAP 10 11/16/2022    ESTGFRAFRICA 12.5 (A) 07/20/2022    EGFRNONAA 10.9 (A) 07/20/2022       3/2020 ECHO    · Mild concentric left ventricular hypertrophy.  · Pulmonary hypertension present.  · Normal left ventricular systolic function. The estimated ejection fraction is 60%.  · Grade I (mild) left ventricular diastolic dysfunction consistent with impaired relaxation.  · No wall motion abnormalities.  · Normal right ventricular systolic function.  · Intermediate central venous pressure (8 mmHg).  · Trivial pericardial effusion.  · There is a left pleural effusion.  · The estimated PA systolic pressure is 47 mmHg          Pre-op Assessment    I have reviewed the Patient Summary Reports.    I have reviewed the Nursing Notes. I have reviewed the NPO Status.   I have reviewed the Medications.     Review of Systems  Anesthesia Hx:  History of prior surgery of interest to airway management or planning: cervical fusion. Denies Family Hx of Anesthesia complications.   Denies Personal Hx of Anesthesia complications.   Social:  Non-Smoker, No Alcohol Use    Hematology/Oncology:         -- Anemia: --  Cancer in past history (Vulvar cancinoma): s/p surgery, s/p  radiation therapy and s/p chemotherapy   chemotherapy, radiation and surgery  Oncology Comments: Vulva cancer     EENT/Dental:  EENT/Dental Normal Full upper and lower partial out.  Broken right lower molar   Cardiovascular:   Hypertension, well controlled ECG has been reviewed. Norepinephrine infusing.  June 2022 echo shows 70% EF, mild MR and TR.   Pulmonary:   COPD, mild Emphysema without inhaler use  Without home oxygen use  Without pulmonologist  Without hospitalizations for exacerbations at this time   Renal/:   Chronic Renal Disease, ESRD, Dialysis renal calculi Patient is sent to ED today for severe hypotension during dialysis.  Patient had about 300 cc blood loss when needle came out of AV fistula.   Hepatic/GI:   Ischemic bowel. Chronic nausea but none today.  Patient had breakfast this morning.   Musculoskeletal:   Lymphedema due to radiation Spine Disorders: cervical and lumbar Disc disease, Degenerative disease and Chronic Pain    Neurological:   Peripheral Neuropathy (Bilateral lower extremities)    Endocrine:   Diabetes, poorly controlled Hypothyroidism    Psych:  Psychiatric Normal           Physical Exam  General:  Cooperative, Alert, Oriented and Cachexia      Airway/Jaw/Neck:  Airway Findings: Mouth Opening: Normal   Tongue: Normal   General Airway Assessment: Adult Mallampati: II  TM Distance: Normal, at least 6 cm   Jaw/Neck Findings:  Neck ROM: Extension Decreased, Mild       Dental:  Dental Findings: Upper partial dentures, Lower partial dentures     Chest/Lungs:  Chest/Lungs Findings: Clear to auscultation, Normal Respiratory Rate      Heart/Vascular:  Heart Findings: Rate: Normal  Rhythm: Regular Rhythm  Sounds: Normal        Mental Status:  Mental Status Findings:  Cooperative, Alert and Oriented         Anesthesia Plan  Type of Anesthesia, risks & benefits discussed:  Anesthesia Type:  Gen ETT    Patient's Preference:   Plan Factors:          Intra-op Monitoring Plan: standard ASA  monitors, Art Line and Central Line  Intra-op Monitoring Plan Comments:   Post Op Pain Control Plan: IV/PO Opioids PRN and multimodal analgesia  Post Op Pain Control Plan Comments:     Induction:   IV and rapid sequence  Beta Blocker:  Patient is not currently on a Beta-Blocker (No further documentation required).       Informed Consent: Informed consent signed with the Patient and all parties understand the risks and agree with anesthesia plan.  All questions answered.  Anesthesia consent signed with patient.  ASA Score: 4   Emergent   Day of Surgery Review of History & Physical:        Anesthesia Plan Notes: GETA / RSI  Etomidate  Arterial and central lines.  Measure CVP  Monitor/treat hypokalemia  Pepcid, Benadryl 12.5 mg  No Zofran due to prolonged QT interval  Patient already got acetaminophen in ED            Ready For Surgery From Anesthesia Perspective.           Physical Exam  General: Cooperative, Alert, Oriented and Cachexia    Airway:  Mallampati: II   Mouth Opening: Normal  TM Distance: Normal, at least 6 cm  Tongue: Normal  Neck ROM: Extension Decreased, Mild    Dental:  Upper partial dentures, Lower partial dentures    Chest/Lungs:  Clear to auscultation, Normal Respiratory Rate    Heart:  Rate: Normal  Rhythm: Regular Rhythm  Sounds: Normal          Anesthesia Plan  Type of Anesthesia, risks & benefits discussed:    Anesthesia Type: Gen ETT  Intra-op Monitoring Plan: standard ASA monitors, Art Line and Central Line  Post Op Pain Control Plan: IV/PO Opioids PRN and multimodal analgesia  Induction:  IV and rapid sequence  Informed Consent: Informed consent signed with the Patient and all parties understand the risks and agree with anesthesia plan.  All questions answered.   ASA Score: 4 Emergent  Anesthesia Plan Notes: GETA / RSI  Etomidate  Arterial and central lines.  Measure CVP  Monitor/treat hypokalemia  Pepcid, Benadryl 12.5 mg  No Zofran due to prolonged QT interval  Patient already got  acetaminophen in ED        Ready For Surgery From Anesthesia Perspective.       .

## 2022-11-17 NOTE — SIGNIFICANT EVENT
Called by radiology:  pt with re-occurrence of ischemic enteritis.  Personally spoke to general surgery NP Vannesa on call for Dr Broderick, informed her of new findings, also informed her of pt's unstable status with hypotension, currently on pressors to maintain BP, with pt reporting severe abdominal pain.  Pt agrees to surgery if surgery recommends this.  Further plan per clinical course.      [UPDATE:  Going to surgery now.  Continuing cefepime, start vanc.  Pt with ESRD on HD, pt previously given approx 700cc IVFs.  Continue pressors.]    Maryana Morgan MD  [facility] Hospitalist

## 2022-11-17 NOTE — SUBJECTIVE & OBJECTIVE
Past Medical History:   Diagnosis Date    Cellulitis of trunk     Perineum, numerous episodes    Chronic diarrhea 02/10/2020    Short-gut syndrome    End stage renal disease on dialysis 01/07/2018    Fatigue     Hypertension     Hypothyroidism 02/10/2020    Iron deficiency anemia due to chronic blood loss 01/07/2018    Pulmonary hypertension 03/20/2020    Vulvar cancer 01/07/2019       Past Surgical History:   Procedure Laterality Date    ABDOMINAL SURGERY      ANGIOGRAPHY OF ARTERIOVENOUS SHUNT N/A 01/19/2022    Procedure: Fistulogram with Possible Intervention;  Surgeon: Raz Maher MD;  Location: [facility] CATH/EP LAB;  Service: Cardiothoracic;  Laterality: N/A;    APPENDECTOMY      AV FISTULA PLACEMENT Left 2018    BACK SURGERY      BLADDER FULGURATION N/A 06/16/2020    Procedure: FULGURATION, BLADDER;  Surgeon: Damari Barber Jr., MD;  Location: [facility] OR;  Service: Urology;  Laterality: N/A;    carpel tunnel surgery once on each hand      CERVICAL FUSION      x 4    cervix repair      CHOLECYSTECTOMY  2000    COLECTOMY      CYSTOSCOPY W/ RETROGRADES  06/16/2020    Procedure: CYSTOSCOPY, WITH RETROGRADE PYELOGRAM;  Surgeon: Damari Barber Jr., MD;  Location: [facility] OR;  Service: Urology;;    CYSTOSCOPY W/ RETROGRADES Right 04/20/2021    Procedure: CYSTOSCOPY, WITH RETROGRADE PYELOGRAM;  Surgeon: Damari Barber Jr., MD;  Location: [facility] OR;  Service: Urology;  Laterality: Right;    CYSTOSCOPY W/ URETERAL STENT PLACEMENT Right 03/16/2021    Procedure: CYSTOSCOPY, WITH URETERAL STENT INSERTION;  Surgeon: Damari Barber Jr., MD;  Location: [facility] OR;  Service: Urology;  Laterality: Right;    CYSTOSCOPY W/ URETERAL STENT PLACEMENT Right 05/18/2021    Procedure: CYSTOSCOPY, WITH URETERAL STENT INSERTION;  Surgeon: Damari Barber Jr., MD;  Location: [facility] OR;  Service: Urology;  Laterality: Right;    CYSTOSCOPY W/ URETERAL STENT PLACEMENT Right 06/22/2022    Procedure: CYSTOSCOPY, WITH URETERAL STENT INSERTION;  Surgeon:  Gaby Garcia MD;  Location: [facility];  Service: Urology;  Laterality: Right;  cysto stent exchange    CYSTOSCOPY W/ URETERAL STENT REMOVAL Right 04/20/2021    Procedure: CYSTOSCOPY, WITH URETERAL STENT REMOVAL;  Surgeon: Damari Barber Jr., MD;  Location: [facility] OR;  Service: Urology;  Laterality: Right;    CYSTOSCOPY WITH URETEROSCOPY, RETROGRADE PYELOGRAPHY, AND INSERTION OF STENT Right 03/30/2021    Procedure: CYSTOSCOPY, WITH RETROGRADE PYELOGRAM AND URETERAL STENT INSERTION;  Surgeon: Damari Barber Jr., MD;  Location: [facility] OR;  Service: Urology;  Laterality: Right;    CYSTOURETEROSCOPY WITH RETROGRADE PYELOGRAPHY AND INSERTION OF STENT INTO URETER Right 01/18/2022    Procedure: CYSTOURETEROSCOPY, WITH RETROGRADE PYELOGRAM AND URETERAL STENT INSERTION;  Surgeon: Damari Barber Jr., MD;  Location: [facility];  Service: Urology;  Laterality: Right;    CYSTOURETEROSCOPY WITH RETROGRADE PYELOGRAPHY AND INSERTION OF STENT INTO URETER Right 8/30/2022    Procedure: CYSTOURETEROSCOPY, WITH RETROGRADE PYELOGRAM AND URETERAL STENT INSERTION;  Surgeon: Damari Barber Jr., MD;  Location: [facility];  Service: Urology;  Laterality: Right;    DIAGNOSTIC LAPAROSCOPY N/A 02/13/2020    Procedure: LAPAROSCOPY, DIAGNOSTIC;  Surgeon: Robbi Lovell III, MD;  Location: [facility];  Service: General;  Laterality: N/A;    HYSTERECTOMY  1980    ILEOSTOMY N/A 02/13/2020    Procedure: CREATION, ILEOSTOMY Loop;  Surgeon: Robbi Lovell III, MD;  Location: [facility] OR;  Service: General;  Laterality: N/A;    ILEOSTOMY CLOSURE N/A 05/18/2020    Procedure: CLOSURE, ILEOSTOMY;  Surgeon: Robbi Lovell III, MD;  Location: [facility] OR;  Service: General;  Laterality: N/A;    LASER LITHOTRIPSY Right 03/30/2021    Procedure: LITHOTRIPSY, USING LASER;  Surgeon: Damari Barber Jr., MD;  Location: [facility];  Service: Urology;  Laterality: Right;    LUMBAR LAMINECTOMY      LYSIS OF ADHESIONS N/A 02/13/2020    Procedure: LYSIS, ADHESIONS;   Surgeon: Robbi Lovell III, MD;  Location: [facility] OR;  Service: General;  Laterality: N/A;    NECK SURGERY      OSTEOTOMY  9/7/2022    Procedure: OSTEOTOMY;  Surgeon: Brigette Chavez MD;  Location: [facility] OR;  Service: General;;    PERCUTANEOUS TRANSLUMINAL ANGIOPLASTY OF ARTERIOVENOUS FISTULA N/A 01/19/2022    Procedure: PTA, AV FISTULA;  Surgeon: Raz Maher MD;  Location: [facility] CATH/EP LAB;  Service: Cardiothoracic;  Laterality: N/A;    PHLEBOGRAPHY  02/28/2020    Procedure: VENOGRAM;  Surgeon: Robbi Lovell III, MD;  Location: [facility] OR;  Service: General;;    REMOVAL OF VASCULAR ACCESS PORT Right 02/13/2020    Procedure: REMOVAL, VASCULAR ACCESS PORT;  Surgeon: Robbi Lovell III, MD;  Location: [facility] OR;  Service: General;  Laterality: Right;    RETROGRADE PYELOGRAPHY  03/16/2021    Procedure: PYELOGRAM, RETROGRADE;  Surgeon: Damari Barber Jr., MD;  Location: [facility] OR;  Service: Urology;;    RETROGRADE PYELOGRAPHY Right 05/18/2021    Procedure: PYELOGRAM, RETROGRADE;  Surgeon: Damari Barber Jr., MD;  Location: [facility] OR;  Service: Urology;  Laterality: Right;    RETROGRADE PYELOGRAPHY  06/22/2022    Procedure: PYELOGRAM, RETROGRADE;  Surgeon: Gaby Garcia MD;  Location: [facility] OR;  Service: Urology;;    SHOULDER SURGERY      TONSILLECTOMY      URETEROSCOPIC REMOVAL OF URETERIC CALCULUS Right 03/30/2021    Procedure: REMOVAL, CALCULUS, URETER, URETEROSCOPIC;  Surgeon: Damari Barber Jr., MD;  Location: [facility] OR;  Service: Urology;  Laterality: Right;    URETEROSCOPIC REMOVAL OF URETERIC CALCULUS Right 04/20/2021    Procedure: REMOVAL, CALCULUS, URETER, URETEROSCOPIC;  Surgeon: Damari Barber Jr., MD;  Location: [facility] OR;  Service: Urology;  Laterality: Right;    URETEROSCOPIC REMOVAL OF URETERIC CALCULUS Right 8/30/2022    Procedure: REMOVAL, CALCULUS, URETER, URETEROSCOPIC;  Surgeon: Damari Barber Jr., MD;  Location: [facility] OR;  Service: Urology;  Laterality: Right;    vulva cancer          Review of patient's allergies indicates:   Allergen Reactions    Ciprofloxacin Other (See Comments)     Elevated liver enzymes      Pcn [penicillins] Anaphylaxis     TOLERATES ROCEPHIN WITHOUT DIFFICULTY       Current Facility-Administered Medications on File Prior to Encounter   Medication    electrolyte-S (ISOLYTE)    lactated ringers infusion     Current Outpatient Medications on File Prior to Encounter   Medication Sig    cholecalciferol, vitamin D3, (VITAMIN D3) 25 mcg (1,000 unit) capsule Take 1 capsule (1,000 Units total) by mouth once daily.    dronabinoL (MARINOL) 2.5 MG capsule Take 2.5 mg by mouth 2 (two) times daily before meals.    ergocalciferol (ERGOCALCIFEROL) 50,000 unit Cap Take 50,000 Units by mouth every 7 days. MONDAYS    ferric citrate (AURYXIA) 210 mg iron Tab Take 2 tablets by mouth 3 (three) times daily with meals.    gabapentin (NEURONTIN) 100 MG capsule Take 100 mg by mouth 2 (two) times daily.    hydrALAZINE (APRESOLINE) 50 MG tablet Take 1 tablet (50 mg total) by mouth every 8 (eight) hours.    hydrOXYzine HCL (ATARAX) 25 MG tablet Take 1 tablet (25 mg total) by mouth 3 (three) times daily as needed for Itching.    levothyroxine (SYNTHROID) 88 MCG tablet TAKE 1 TABLET BEFORE BREAKFAST (Patient taking differently: Take 88 mcg by mouth before breakfast. TAKE 1 TABLET BEFORE BREAKFAST)    LORazepam (ATIVAN) 0.5 MG tablet Take 1 tablet (0.5 mg total) by mouth every evening.    midodrine (PROAMATINE) 5 MG Tab Take 5 mg by mouth 3 (three) times daily.    mirtazapine (REMERON) 15 MG tablet Take 7.5 mg by mouth every evening.    promethazine (PHENERGAN) 25 MG tablet TAKE 1 TABLET (25 MG TOTAL) BY MOUTH 2 (TWO) TIMES DAILY AS NEEDED FOR NAUSEA.    rOPINIRole (REQUIP) 0.5 MG tablet Take 0.5 mg by mouth every 8 (eight) hours.    sertraline (ZOLOFT) 50 MG tablet Take 1 tablet (50 mg total) by mouth every evening. Take 50 mg by mouth every evening.    simethicone (MYLICON) 80 MG chewable tablet  Take 80 mg by mouth every 6 (six) hours as needed.    traMADoL (ULTRAM) 50 mg tablet Take 1 tablet by mouth 4 (four) times daily as needed.    zinc gluconate 50 mg tablet Take 50 mg by mouth once daily.    cholestyramine (QUESTRAN) 4 gram packet Take 4 g by mouth 2 (two) times daily.    diltiaZEM (CARDIZEM CD) 120 MG Cp24 Take 1 capsule (120 mg total) by mouth once daily.    ferrous sulfate (FEOSOL) 325 mg (65 mg iron) Tab tablet 325 mg.    oxyCODONE (OXY-IR) 5 mg Cap Take 5 mg by mouth.    oxyCODONE-acetaminophen (PERCOCET) 5-325 mg per tablet Take 1 tablet by mouth every 6 (six) hours as needed for Pain.    sevelamer carbonate (RENVELA) 800 mg Tab 800 mg.    zinc sulfate (ZINCATE) 50 mg zinc (220 mg) capsule Take 1 capsule by mouth once daily.    [DISCONTINUED] miconazole (MICOTIN) 2 % cream Apply topically 2 (two) times daily.     Family History       Problem Relation (Age of Onset)    Cancer Mother    Heart disease Mother, Father, Sister    Hypertension Mother    No Known Problems Daughter          Tobacco Use    Smoking status: Former     Packs/day: 1.00     Years: 33.00     Pack years: 33.00     Types: Cigarettes     Quit date: 2000     Years since quittin.8    Smokeless tobacco: Former     Quit date:    Substance and Sexual Activity    Alcohol use: No    Drug use: No    Sexual activity: Not on file     Review of Systems   Constitutional:  Positive for appetite change. Negative for fever.   HENT:  Negative for trouble swallowing.    Respiratory:  Negative for shortness of breath.    Cardiovascular:  Negative for chest pain and leg swelling.   Gastrointestinal:  Positive for nausea (chronic). Negative for vomiting.        Has colostomy    Genitourinary:         Produces small amount of urine   Musculoskeletal:  Positive for back pain.   Allergic/Immunologic: Negative for immunocompromised state.   Neurological:  Positive for dizziness and light-headedness. Negative for headaches.    Psychiatric/Behavioral:  Negative for confusion.    Objective:     Vital Signs (Most Recent):  Temp: 97.8 °F (36.6 °C) (11/16/22 1306)  Pulse: 85 (11/16/22 1830)  Resp: 18 (11/16/22 1830)  BP: 120/74 (11/16/22 1830)  SpO2: 100 % (11/16/22 1830) Vital Signs (24h Range):  Temp:  [97.8 °F (36.6 °C)] 97.8 °F (36.6 °C)  Pulse:  [81-92] 85  Resp:  [17-20] 18  SpO2:  [97 %-100 %] 100 %  BP: ()/(50-74) 120/74     Weight: 47.2 kg (104 lb)  Body mass index is 16.29 kg/m².    Physical Exam  Vitals and nursing note reviewed.   Constitutional:       Comments: Elderly female patient lying in stretcher, cooperative   HENT:      Head: Normocephalic and atraumatic.      Mouth/Throat:      Mouth: Mucous membranes are moist.   Eyes:      Comments: Wearing corrective lenses   Cardiovascular:      Rate and Rhythm: Normal rate and regular rhythm.      Comments: Warm peripheries, no LE edema  Pulmonary:      Effort: No respiratory distress.      Breath sounds: No stridor. No wheezing.   Abdominal:      General: Bowel sounds are normal.      Comments: Non distended, soft, healed surgical incision, colostomy left abdomen with semi solid output   Musculoskeletal:      Comments: OA changes   Skin:     General: Skin is warm and dry.      Comments: AVF- dressing, no active bleeding, LUE midline   Neurological:      Mental Status: She is alert and oriented to person, place, and time.      Comments: Speech intact, answering questions appropriately, moving all four extremities, non focal    Psychiatric:         Mood and Affect: Mood normal.           Significant Labs: Blood Culture: No results for input(s): LABBLOO in the last 48 hours.  BMP:   Recent Labs   Lab 11/16/22  1318   GLU 96      K 1.9*      CO2 28   BUN 5*   CREATININE 2.4*   CALCIUM 6.2*   MG 1.2*     CBC:   Recent Labs   Lab 11/16/22  1318   WBC 11.36   HGB 8.6*   HCT 27.5*        CMP:   Recent Labs   Lab 11/16/22  1318      K 1.9*      CO2 28    GLU 96   BUN 5*   CREATININE 2.4*   CALCIUM 6.2*   PROT 4.8*   ALBUMIN 2.2*   BILITOT 0.9   ALKPHOS 97   AST 15   ALT 10   ANIONGAP 10     Cardiac Markers:   Recent Labs   Lab 11/16/22  1318   *     Lactic Acid:   Recent Labs   Lab 11/16/22  1318 11/16/22  1727   LACTATE 2.2* 1.5     Magnesium:   Recent Labs   Lab 11/16/22  1318   MG 1.2*     POCT Glucose: No results for input(s): POCTGLUCOSE in the last 48 hours.  Respiratory Culture: No results for input(s): GSRESP, RESPIRATORYC in the last 48 hours.  Troponin:   Recent Labs   Lab 11/16/22  1318   TROPONINIHS 13.5     TSH:   Recent Labs   Lab 11/16/22  1318   TSH 2.820     Urine Culture: No results for input(s): LABURIN in the last 48 hours.  Urine Studies: No results for input(s): COLORU, APPEARANCEUA, PHUR, SPECGRAV, PROTEINUA, GLUCUA, KETONESU, BILIRUBINUA, OCCULTUA, NITRITE, UROBILINOGEN, LEUKOCYTESUR, RBCUA, WBCUA, BACTERIA, SQUAMEPITHEL, HYALINECASTS in the last 48 hours.    Invalid input(s): WRIGHTSUR    Significant Imaging: I have reviewed all pertinent imaging results/findings within the past 24 hours.    X-Ray Chest AP Portable    Result Date: 11/16/2022  XR CHEST 1 VIEW CLINICAL HISTORY: 72 years Female Sepsis COMPARISON: September 26, 2022 FINDINGS: Cardiac silhouette size is within normal limits. Atherosclerotic calcification of the aorta. No confluent airspace disease. No large pleural effusion or pneumothorax. No acute osseous abnormality. Vascular stent or graft within the left upper arm. Right upper quadrant cholecystectomy clips. IMPRESSION: No acute pulmonary process. Electronically signed by:  Justino Rivera MD  11/16/2022 1:57 PM CST Workstation: [id number]    XR Ribs Min 3 views w/PA Chest Left    Result Date: 10/29/2022  PA chest with left RIBS CLINICAL DATA: Trauma FINDINGS: PA view of the chest is compared to September 2022. Heart size is upper normal. The aortic arch is calcified. Bilateral interstitial prominence is unchanged  and likely on the basis of mild fibrosis. There are no pleural effusions. There is no evidence of pneumothorax. 3 images targeted to the left ribs demonstrate acute nondisplaced fractures of the left 11th rib posteriorly and posterolaterally. IMPRESSION: 1. Acute left 11th rib fracture. No pneumothorax or pleural effusion. 2. Chronic appearing mild bilateral abnormal interstitial prominence. Electronically signed by:  Dixon Thomas MD  10/29/2022 12:00 PM CDT Workstation: [id number]

## 2022-11-17 NOTE — ANESTHESIA POSTPROCEDURE EVALUATION
Anesthesia Post Evaluation    Patient: Shara Hutcihns    Procedure(s) Performed: Procedure(s) (LRB):  LAPAROTOMY, EXPLORATORY (N/A)    Final Anesthesia Type: general      Patient location during evaluation: ICU  Patient participation: Yes- Able to Participate  Level of consciousness: awake  Post-procedure vital signs: reviewed and stable  Pain management: adequate  Airway patency: patent    PONV status at discharge: No PONV  Anesthetic complications: no      Cardiovascular status: hemodynamically stable  Respiratory status: unassisted and spontaneous ventilation  Follow-up not needed.          Vitals Value Taken Time   BP * 11/17/22 0149   Temp * 11/17/22 0149   Pulse 85 11/17/22 0146   Resp 13 11/17/22 0146   SpO2 99 % 11/17/22 0146   Vitals shown include unvalidated device data.      No case tracking events are documented in the log.      Pain/Kvng Score: Pain Rating Prior to Med Admin: 10 (11/16/2022  8:13 PM)  Pain Rating Post Med Admin: 10 (11/16/2022  3:57 PM)

## 2022-11-17 NOTE — PROGRESS NOTES
Patient seen and examined.  Relatively unchanged since after surgery.  Still on significant doses of Levophed.  Still having significant abdominal pain.  No ostomy output     Hypotensive on 0.5 of Levophed  Not tachycardic   Afebrile  Abdomen is tender and scaphoid.  Ostomy is viable.  No ostomy output     Labs reviewed, potassium has been corrected    No significant changes from my perspective.  Would recommend proceeding with continued ongoing fluid resuscitation to wound pressors as quickly as possible.  NPO until better bowel function and off pressors.  [facility] will stay in for at least 2 weeks and patient will be followed by Urology for cystogram given intra operative bowel injury.      I discussed again with the patient that she is a very high risk of dehydration and short gut syndrome given paucity of remaining small bowel and now no colon for water absorption.  She may ultimately require IV fluid support.      Following.

## 2022-11-17 NOTE — HOSPITAL COURSE
Patient presented with worsening hypotension from outpatient dialysis center.  Recent complicated past medical history.  She received IV fluid and was started on Levophed.  CT abdomen and pelvis obtained with diffuse pneumatosis intestinal most prominent ascending, transverse and descending colon with portal vein gas.  She was started on broad-spectrum antibiotics.  General surgery was consulted and underwent emergent exploratory laparotomy with total colectomy, creation of ileostomy, inadvertent bladder injury requiring repair.  She was extubated postprocedure and transferred to the ICU.  Received 1 unit PRBC and FFP.  On 11/17 reports nausea, abdominal pain, still on Levophed.  11/18 still with significant abdominal pain, no output from ileostomy, receiving dialysis today, running even, still on Levophed, resuming home midodrine and attempting to wean off.  On 11/20, received unit of PRBC yesterday, encephalopathy persists, CT head negative encephalopathy labs ordered, having output from ileostomy and advanced to clear liquid diet.  Urine output from [facility] is now yellow.  On 11/21, encephalopathy persists, exam is nonfocal, having output from ileostomy, not safe for oral intake, on TPN, due for dialysis today, will re-evaluate after clearance to see if mentation has improved.

## 2022-11-17 NOTE — TRANSFER OF CARE
"Anesthesia Transfer of Care Note    Patient: Shara Hutchins    Procedure(s) Performed: Procedure(s) (LRB):  LAPAROTOMY, EXPLORATORY (N/A)    Patient location: ICU    Anesthesia Type: general    Transport from OR: Transported from OR on room air with adequate spontaneous ventilation    Post pain: adequate analgesia    Post assessment: no apparent anesthetic complications    Post vital signs: stable    Level of consciousness: awake    Nausea/Vomiting: no nausea/vomiting    Complications: none    Transfer of care protocol was followed      Last vitals:   Visit Vitals  /83   Pulse 83   Temp 36.6 °C (97.8 °F) (Oral)   Resp (!) 24   Ht 5' 7" (1.702 m)   Wt 47.2 kg (104 lb)   SpO2 96%   BMI 16.29 kg/m²     "

## 2022-11-17 NOTE — PROGRESS NOTES
Pharmacokinetic Initial Assessment: IV Vancomycin    Assessment/Plan:    Initiate intravenous vancomycin with loading dose of 750 mg once with subsequent doses when random concentrations are less than 20 mcg/mL  Desired empiric serum trough concentration is 15 to 20 mcg/mL  Draw vancomycin random level on 11/17 at 2100.  Pharmacy will continue to follow and monitor vancomycin.      Please contact pharmacy at extension [phone] with any questions regarding this assessment.     Thank you for the consult,   Otoniel Khan       Patient brief summary:  Shara Hutchins is a 72 y.o. female initiated on antimicrobial therapy with IV Vancomycin for treatment of suspected bacteremia    Drug Allergies:   Review of patient's allergies indicates:   Allergen Reactions    Ciprofloxacin Other (See Comments)     Elevated liver enzymes      Pcn [penicillins] Anaphylaxis     TOLERATES ROCEPHIN WITHOUT DIFFICULTY       Actual Body Weight:   47.2 kg    Renal Function:   Estimated Creatinine Clearance: 15.8 mL/min (A) (based on SCr of 2.4 mg/dL (H)).,     CBC (last 72 hours):  Recent Labs   Lab Result Units 11/16/22  1318   WBC K/uL 11.36   Hemoglobin g/dL 8.6*   Hematocrit % 27.5*   Platelets K/uL 162   Gran % % 75.5*   Lymph % % 17.2*   Mono % % 5.1   Eosinophil % % 1.5   Basophil % % 0.4   Differential Method  Automated       Metabolic Panel (last 72 hours):  Recent Labs   Lab Result Units 11/16/22  1318   Sodium mmol/L 139   Potassium mmol/L 1.9*   Chloride mmol/L 101   CO2 mmol/L 28   Glucose mg/dL 96   BUN mg/dL 5*   Creatinine mg/dL 2.4*   Albumin g/dL 2.2*   Total Bilirubin mg/dL 0.9   Alkaline Phosphatase U/L 97   AST U/L 15   ALT U/L 10   Magnesium mg/dL 1.2*       Drug levels (last 3 results):  No results for input(s): VANCOMYCINRA, VANCORANDOM, VANCOMYCINPE, VANCOPEAK, VANCOMYCINTR, VANCOTROUGH in the last 72 hours.    Microbiologic Results:  Microbiology Results (last 7 days)       Procedure Component Value Units Date/Time    Blood  culture x two cultures. Draw prior to antibiotics. [[id number]] Collected: 11/16/22 1322    Order Status: Sent Specimen: Blood from Peripheral, Upper Arm, Right Updated: 11/16/22 1416    Blood culture x two cultures. Draw prior to antibiotics. [[id number]] Collected: 11/16/22 1313    Order Status: Sent Specimen: Blood from Peripheral, Wrist, Right Updated: 11/16/22 1416

## 2022-11-17 NOTE — PROGRESS NOTES
On license of [facility]  Adult Nutrition   Consult Note (Nutrition Support Management)    SUMMARY     Recommendations  Recommendation/Intervention:   1) TPN ordered to be initiated @ 1700. Run @ 50 mL/hr.   2) Blood glucose and electrolytes to be monitored and corrected accordingly.   3) RD to monitor TPN infusion and tolerance. Monitor I/Os. Monitor and mange nutrition support daily.    Goals:   1) Pt to meet estimated needs via TPN provision.   2) BG and electrolytes to trend towards normal limits.  Nutrition Goal Status: new, progressing towards goal    Dietitian Rounds Brief  Pt is a 71 y/o F brought to ED via EMS from outpt dialysis center d/t hypotension. Pt is w/ ESRD on MWF dialysis. S/p emergent exploratory laparotomy w/ total colectomy, creation of ileostomy, inadvertent bladder injury requiring repair. Extubated postprocedure and transferred to ICU. 2 months ago, she was admitted w/ ischemic colitis s/p laparotomy w/ colostomy. Pt seen resting in bed during time of RD rounds today. Pt is NPO + MD consulted for TPN. TPN ordered.     PARENTERAL NUTRITION PROGRESS NOTE:    Parenteral Nutrition Day # 1  Diagnosis/Indication for PN: S/p total colectomy, malnutrition  IV Access: Percutaneous Central Line  Diet/Tube Feeding: NPO     Admixture Type: 3-in-1  Infusion Rate and Frequency: 50 mL/hr  Patient Acuity: ICU     PN Composition:   65 grams Amino Acid, 130 grams Dextrose, and 50 grams Lipid.    Today's TPN provides 1202 kcal.  *Dextrose is started at less than full dextrose goal to reduce risk for Refeeding Syndrome. Dextrose content will be advanced as appropriate over the next few days.     Please see order for electrolytes and additives content and adjustments.   *The Nutrition Support Team will continue to monitor electrolytes daily and adjust parenteral nutrition as warranted.    Malnutrition Assessment:  Pt is w/ previous malnutrition d/x. Per chart review, pt weighted 156 lbs on 9/822 and now  "currently weights 104 lbs. Pt has lost 52 lbs within a 2 months time span (33% body wt loss) - severe. RD agrees w/ previous d/x s/t BMI of 16.28 kg/m2, and 52 lbs wt loss (33% body wt loss).     Severe malnutrition related to decreased intake in pt w/ increased needs d/t HD, cancer as evidenced by BMI of 16.28 kg/m2, 52 lbs wt loss (33% body wt loss), and < 75% of estimated energy requirement for > 1 month.      Diet order:   Current Diet Order: NPO      Current Nutrition Support Formula Ordered: Other (Comment) (Custom 3-in-1 TPN)  Current Nutrition Support Rate Ordered: 50 (ml)  Current Nutrition Support Frequency Ordered: mL/hr    Evaluation of Received Nutrient/Fluid Intake  Parenteral Calories (kcal): 1202  Parenteral Protein (gm): 65  Parenteral Fluid (mL): 1200  Lipid Calories (kcals): 500 kcals  Energy Calories Required: not meeting needs  Protein Required: not meeting needs  Fluid Required: not meeting needs  Tolerance: tolerating     % Intake of Estimated Energy Needs: 0%  % Meal Intake: NPO      Intake/Output Summary (Last 24 hours) at 11/17/2022 1151  Last data filed at 11/17/2022 0615  Gross per 24 hour   Intake 1378.25 ml   Output --   Net 1378.25 ml        Anthropometrics  Temp: 98.4 °F (36.9 °C)  Height Method: Stated  Height: 5' 7" (170.2 cm)  Height (inches): 67 in  Weight Method: Bed Scale  Weight: 47.2 kg (104 lb 0.9 oz)  Weight (lb): 104.06 lb  Ideal Body Weight (IBW), Female: 135 lb  % Ideal Body Weight, Female (lb): 77.08 %  BMI (Calculated): 16.3  BMI Grade: 16 - 16.9 protein-energy malnutrition grade II       Estimated/Assessed Needs  Weight Used For Calorie Calculations: 47.2 kg (104 lb 0.9 oz)  Energy Calorie Requirements (kcal): 5619-1099 (35-40)  Energy Need Method: Kcal/kg  Protein Requirements: 57-71 (1.2-1.5 gm/kg)  Weight Used For Protein Calculations: 47.2 kg (104 lb 0.9 oz)  Fluid Requirements (mL): 24 hour UOP + 1000 mL     RDA Method (mL): 7427       Reason for " Assessment  Reason For Assessment: consult, new TPN  Diagnosis: other (see comments) (Status post total colectomy)  Relevant Medical History: Vulvar cancer, hypotension, hypothyroidism, anemia of chronic disease, severe malnutrition, ESRD on dialysis MWF    Nutrition/Diet History  Food Allergies: NKFA  Factors Affecting Nutritional Intake: altered gastrointestinal function, NPO, nausea/vomiting    Nutrition Risk Screen  Nutrition Risk Screen: no indicators present     MST Score: 0  Have you recently lost weight without trying?: No  Weight loss score: 0  Have you been eating poorly because of a decreased appetite?: No  Appetite score: 0       Weight History:  Wt Readings from Last 5 Encounters:   11/17/22 47.2 kg (104 lb 0.9 oz)   11/17/22 47.2 kg (104 lb)   10/29/22 50.8 kg (112 lb)   09/15/22 70.8 kg (156 lb)   09/08/22 70.9 kg (156 lb 3.2 oz)        Medications:Pertinent Medications Reviewed  Scheduled Meds:   ceFEPime (MAXIPIME) IVPB  1 g Intravenous Q24H    chlorhexidine  15 mL Mouth/Throat BID    [START ON 11/18/2022] epoetin nica-ebpx (RETACRIT) injection  100 Units/kg Intravenous Every Mon, Wed, Fri    levothyroxine  88 mcg Oral Before breakfast    mupirocin   Nasal BID     Continuous Infusions:   NORepinephrine bitartrate-D5W 0.5 mcg/kg/min (11/17/22 0913)    TPN ADULT CENTRAL LINE CUSTOM (3 in 1)       PRN Meds:.acetaminophen, calcium gluconate IVPB, calcium gluconate IVPB, calcium gluconate IVPB, dextrose 10%, dextrose 10%, glucagon (human recombinant), glucose, glucose, magnesium sulfate IVPB, magnesium sulfate IVPB, morphine, naloxone, ondansetron, potassium chloride in water **AND** potassium chloride in water **AND** potassium chloride in water, prochlorperazine, senna-docusate 8.6-50 mg, sodium chloride 0.9%, sodium phosphate IVPB, sodium phosphate IVPB, sodium phosphate IVPB    Labs: Pertinent Labs Reviewed  Clinical Chemistry:  Recent Labs   Lab 11/17/22  0839   *   K 3.9   CL 95   CO2 23    *   BUN 10   CREATININE 3.3*   CALCIUM 7.3*   PROT 4.9*   ALBUMIN 2.3*   BILITOT 3.7*   ALKPHOS 128   AST 39   ALT 22   ANIONGAP 8   MG 1.8   PHOS 3.4     CBC:   Recent Labs   Lab 11/17/22  0839   WBC 13.12*   RBC 3.02*   HGB 9.9*   HCT 31.1*      *   MCH 32.8*   MCHC 31.8*     Cardiac Profile:  Recent Labs   Lab 11/16/22  1318   *     Thyroid & Parathyroid:  Recent Labs   Lab 11/16/22  1318   TSH 2.820       Monitor and Evaluation  Food and Nutrient Intake: energy intake, parenteral nutrition intake  Food and Nutrient Adminstration: enteral and parenteral nutrition administration  Knowledge/Beliefs/Attitudes: food and nutrition knowledge/skill  Physical Activity and Function: nutrition-related ADLs and IADLs  Anthropometric Measurements: weight, weight change, body mass index  Biochemical Data, Medical Tests and Procedures: electrolyte and renal panel, gastrointestinal profile, glucose/endocrine profile, inflammatory profile, lipid profile  Nutrition-Focused Physical Findings: overall appearance     Nutrition Risk  Level of Risk/Frequency of Follow-up: high     Nutrition Follow-Up  RD Follow-up?: Yes      Alesia Rangel RD 11/17/2022 11:51 AM

## 2022-11-17 NOTE — ANESTHESIA PROCEDURE NOTES
Arterial line    Diagnosis: Hypotension requiring norepinephrine infusion    Patient location during procedure: done in OR  Procedure start time: 11/16/2022 10:10 PM  Timeout: 11/16/2022 10:10 PM  Procedure end time: 11/16/2022 10:15 PM    Staffing  Authorizing Provider: Artie Sheriff MD  Performing Provider: Artie Sheriff MD    Anesthesiologist was present at the time of the procedure.    Preanesthetic Checklist  Completed: patient identified, IV checked, site marked, risks and benefits discussed, surgical consent, monitors and equipment checked, pre-op evaluation, timeout performed and anesthesia consent givenArterial line  Skin Prep: chlorhexidine gluconate  Local Infiltration: lidocaine  Orientation: right  Location: radial    Catheter Size: 20 G  Catheter placement by Ultrasound guidance. Heme positive aspiration all ports.   Vessel Caliber: medium, patent, compressibility normal  Vascular Doppler:  not done  Needle advanced into vessel with real time Ultrasound guidance.  Sterile sheath used.Insertion Attempts: 1  Assessment  Dressing: sutured in place and taped and tegaderm  Patient: Tolerated well  Additional Notes  Arterial line placed before induction of general anesthesia.

## 2022-11-17 NOTE — OP NOTE
[facility] - Emergency Dept  Surgery Department  Operative Note    SUMMARY     Date of Procedure: 11/16/2022     Procedure: Procedure(s) (LRB):  LAPAROTOMY, EXPLORATORY (N/A)   Takedown of end colostomy with Total colectomy  Creation of end ileostomy  Adhesiolysis  Repair of bladder injury      Surgeon(s) and Role:     * Kannan Broderick MD - Primary    Assisting Surgeon: None    Pre-Operative Diagnosis: Ischemic colon [K55.9]    Post-Operative Diagnosis: Post-Op Diagnosis Codes:     * Ischemic colon [K55.9]    Anesthesia: General    Operative Findings (including complications, if any):  Patchy necrosis of the colon, mostly transverse.  Takedown of end colostomy with total colectomy.  Creation of end ileostomy.  Paucity of small bowel.      Inadvertent tear in the bladder related to radiation adhesions to the terminal ileum, repaired with PDS.    Description of Technical Procedures:  This is a 72-year-old female who has previously undergone multiple abdominal operations on the small and large bowel.  Most recently she had her sigmoid colon removed with creation of an end colostomy for ischemic colitis and a stricture.  It was noted that she had a paucity of small bowel during that operation.  She re-presented with portal venous gas and concerns for pneumatosis of the remaining colon and peritonitis on exam.  Patient was also in shock on high doses of Levophed.  I recommended exploration with probable total colectomy and end ileostomy despite the expectation that patient would have a small amount of small bowel left.  Patient did consent to the planned procedure.    She was taken to the OR, placed supine, general endotracheal anesthesia was induced, the end colostomy was closed with a running silk suture, the abdomen was prepped and draped in the usual fashion, a time-out was completed.  A generous midline laparotomy was made sharply.  Deep tissues were divided using electrocautery down to the midline  fascia.  I entered the abdomen sharply in a subxiphoid location which seemed to be relatively unexplored previously.  There were adhesions between the midline incision from prior surgeries as well as the omentum, transverse colon, and small bowel.  Adhesions were divided using a combination of sharp and electrocautery dissection as appropriate.  Once the entire laparotomy was opened, a again performed adhesiolysis throughout the abdominal cavity.  Adhesions were worse in the lower abdomen and pelvis, likely related to pelvic radiation from her vulvar cancer.  The transverse colon was easily identified.  There were some areas of patchy necrosis on the serosal surface.  The examined small bowel did have a grossly normal appearance.  Given her preoperative findings, I elected to proceed with the planned total colectomy.  I started dissection by medializing the right colon.  The cecum/proximal right colon was densely adherent to the pelvic brim and pelvis.  Sharp dissection was used to help free adhesions.  During dissection, a hole was noted in the midline pelvis.  A ureteral stent could be identified within the cavity consistent with a bladder injury.  The hole was approximately 1 cm.  I was able to free the colon and small bowel without additional injury.  I then elected to close the bladder injury using a 3-0 PDS suture in 2 layers.  I next moved to the left colon.  Additional attachments and adhesions were freed using a combination of electrocautery and LigaSure dissection.  With the entire colon mobilized, I transected the terminal ileum.  The mesentery of the colon was divided using a LigaSure device all the way to the end colostomy.  Finally the silk suture that was used to close the colostomy on the skin was cut.  The mucocutaneous junction was divided using a scalpel.  The end colostomy was freed from the soft tissues using electrocautery and delivered into the abdominal cavity.  The open colotomy was then  closed with a blue staple load.  The specimen was passed off as total colectomy and terminal ileum.  The abdominal cavity was inspected for hemostasis which was adequate.  There was some oozing related to raw surface edges, especially in the pelvis.  The fascial defect from the colostomy was then closed in 2 layers with a 2-0 PDS suture.  Next, a t circular incision was made in the right lower quadrant skin.  A trephination in the abdominal wall musculature was made using electrocautery.  The end of the ileum was then delivered through the planned ileostomy site and secured with a [place].  Midline fascia was closed with looped 0 PDS sutures.  Skin was closed with staples over the midline incision and colostomy loosely.  Finally, an end brooked ileostomy was fashioned using interrupted Vicryl sutures.  Dressings and an ostomy appliance were applied.  Patient tolerated the entire procedure without significant hemodynamic changes noting that she was on significant Levophed before, during, and after the procedure.  She was extubated in the OR.  Complications include a small cystotomy that was inadvertent related to dense adhesions from prior pelvic radiation and surgeries.  A 22 modifier is justified given the difficulty adhesiolysis related to multiple prior abdominal surgeries and prior pelvic radiation.    Significant Surgical Tasks Conducted by the Assistant(s), if Applicable: n/a    Estimated Blood Loss (EBL):50cc           Implants: * No implants in log *    Specimens:   Specimen (24h ago, onward)       Start     Ordered    11/17/22 0054  Specimen to Pathology - Surgery  Once        Comments: Pre-op Diagnosis: Ischemic colon [K55.9]Procedure(s):LAPAROTOMY, EXPLORATORY Number of specimens: 1Name of specimens: 1. Total abdominal colectomy     Question:  Release to patient  Answer:  Immediate    11/17/22 0054                            Condition: Critical    Disposition:  ICU, extubated but critically ill and on  hemodynamic support    Attestation: I was present and scrubbed for the entire procedure.

## 2022-11-17 NOTE — PROGRESS NOTES
Pharmacist Renal Dose Adjustment Note    Shara Hutchins is a 72 y.o. female being treated with the medication Cefepime    Patient Data:    Vital Signs (Most Recent):  Temp: 97.8 °F (36.6 °C) (11/16/22 1306)  Pulse: 79 (11/16/22 2015)  Resp: (!) 24 (11/16/22 2013)  BP: (!) 112/58 (11/16/22 2015)  SpO2: 96 % (11/16/22 2015)   Vital Signs (72h Range):  Temp:  [97.8 °F (36.6 °C)]   Pulse:  [79-92]   Resp:  [13-24]   BP: ()/(50-74)   SpO2:  [93 %-100 %]      Recent Labs   Lab 11/16/22  1318   CREATININE 2.4*     Serum creatinine: 2.4 mg/dL (H) 11/16/22 1318  Estimated creatinine clearance: 15.8 mL/min (A)    Medication:Cefepime dose: 1 g frequency Q8H will be changed to medication:Cefepime dose:1 g frequency:Q24H    Pharmacist's Name: Otoniel Khan  Pharmacist's Extension: [phone]

## 2022-11-17 NOTE — ANESTHESIA PROCEDURE NOTES
Intubation    Date/Time: 11/16/2022 10:17 PM  Performed by: Artie Sheriff MD  Authorized by: Moreno Gaming MD     Intubation:     Induction:  Rapid sequence induction    Intubated:  Postinduction    Mask Ventilation:  Not attempted    Attempts:  1    Attempted By:  CRNA    Method of Intubation:  Video laryngoscopy    Blade:  Arevalo 3    Laryngeal View Grade: Grade I - full view of cords      Difficult Airway Encountered?: No      Complications:  None    Airway Device:  Oral endotracheal tube

## 2022-11-17 NOTE — SUBJECTIVE & OBJECTIVE
Interval History:  See hospital course.  Patient reports abdominal pain most prominent bilateral sides, moderate to severe, persistent.  Admits to severe nausea without emesis.  Denies any shortness of breath.  Remains on Levophed for blood pressure support.  On nasal cannula.  Afebrile.  Labs with WBC 9.35, hemoglobin 8, potassium 3.4, magnesium 1.9, lactic acid 3.3.  CT on admission reviewed.  Discussed with patient.  No visitors at bedside.  Discussed with nursing.    Review of Systems   Constitutional:  Negative for fever.   Respiratory:  Negative for shortness of breath.    Gastrointestinal:  Positive for abdominal pain and nausea.   Genitourinary:         Has Godwin catheter in place   Musculoskeletal:  Positive for back pain.   Neurological:  Positive for weakness.   Psychiatric/Behavioral:  Negative for confusion.    Objective:     Vital Signs (Most Recent):  Temp: 98.4 °F (36.9 °C) (11/17/22 0530)  Pulse: 87 (11/17/22 0900)  Resp: (!) 33 (11/17/22 0900)  BP: (!) 138/56 (11/17/22 0900)  SpO2: 99 % (11/17/22 0900)   Vital Signs (24h Range):  Temp:  [97.8 °F (36.6 °C)-98.4 °F (36.9 °C)] 98.4 °F (36.9 °C)  Pulse:  [70-92] 87  Resp:  [10-33] 33  SpO2:  [89 %-100 %] 99 %  BP: ()/(43-83) 138/56  Arterial Line BP: ()/(34-54) 127/47     Weight: 47.2 kg (104 lb 0.9 oz)  Body mass index is 16.3 kg/m².    Intake/Output Summary (Last 24 hours) at 11/17/2022 0935  Last data filed at 11/17/2022 0615  Gross per 24 hour   Intake 1378.25 ml   Output --   Net 1378.25 ml      Physical Exam  Vitals and nursing note reviewed.   Constitutional:       Comments: Elderly frail female lying in bed, ill appearing, cooperative   HENT:      Head: Normocephalic and atraumatic.      Mouth/Throat:      Mouth: Mucous membranes are moist.      Comments: Chapped lips  Eyes:      General:         Right eye: No discharge.         Left eye: No discharge.   Neck:      Comments: LIJ in place  Cardiovascular:      Rate and Rhythm: Normal  rate and regular rhythm.      Comments: Wearing SCDs  Pulmonary:      Comments: On NC, decreased at bases, no wheeze, no accessory muscle use  Abdominal:      Comments: Scaphoid, ileostomy RLQ, surgical dressing in place, no BS    Genitourinary:     Comments: Godwin in place with minimal bloody urine  Skin:     Comments: Right radial arterial line, LUE AVF with dressing    Neurological:      Mental Status: She is alert and oriented to person, place, and time.      Comments: +generalized weakness   Psychiatric:         Mood and Affect: Mood normal.       Significant Labs: Blood Culture:   Recent Labs   Lab 11/16/22  1313 11/16/22  1322   LABBLOO No Growth to date No Growth to date     BMP:   Recent Labs   Lab 11/17/22  0145 11/17/22  0839   * 137*   * 126*   K 3.4* 3.9   CL 99 95   CO2 25 23   BUN 9  --    CREATININE 3.4*  --    CALCIUM 7.0* 7.3*   MG 1.9  --      CBC:   Recent Labs   Lab 11/16/22  2056 11/16/22  2216 11/17/22  0024 11/17/22  0145 11/17/22  0839   WBC 16.66*  --   --  9.35 13.12*   HGB 8.8*  --   --  8.0* 9.9*   HCT 28.1*   < > 24* 25.6* 31.1*     --   --  189 151    < > = values in this interval not displayed.     CMP:   Recent Labs   Lab 11/16/22  1318 11/16/22  2110 11/17/22  0145 11/17/22  0839    133* 132* 126*   K 1.9* 2.6* 3.4* 3.9    95 99 95   CO2 28 30* 25 23   GLU 96 161* 169* 137*   BUN 5* 9 9  --    CREATININE 2.4* 3.4* 3.4*  --    CALCIUM 6.2* 7.3* 7.0* 7.3*   PROT 4.8*  --  4.3*  --    ALBUMIN 2.2*  --  2.1*  --    BILITOT 0.9  --  1.4*  --    ALKPHOS 97  --  126  --    AST 15  --  73*  --    ALT 10  --  23  --    ANIONGAP 10 8 8 8     Cardiac Markers:   Recent Labs   Lab 11/16/22  1318   *     Lactic Acid:   Recent Labs   Lab 11/16/22  1727 11/16/22 2124 11/17/22  0145   LACTATE 1.5 2.2* 3.3*     Magnesium:   Recent Labs   Lab 11/16/22  1318 11/16/22 2110 11/17/22  0145   MG 1.2* 2.2 1.9     POCT Glucose: No results for input(s): POCTGLUCOSE in  the last 48 hours.  Troponin:   Recent Labs   Lab 11/16/22  1318 11/16/22 2110   TROPONINIHS 13.5 16.3*     TSH:   Recent Labs   Lab 11/16/22  1318   TSH 2.820     Urine Culture:   Recent Labs   Lab 11/16/22 2111   LABURIN No growth to date     Urine Studies:   Recent Labs   Lab 11/16/22 2111   COLORU Yellow   APPEARANCEUA Cloudy*   PHUR 8.0   SPECGRAV 1.010   PROTEINUA 3+*   GLUCUA Negative   KETONESU Negative   BILIRUBINUA Negative   OCCULTUA 2+*   NITRITE Negative   UROBILINOGEN Negative   LEUKOCYTESUR 3+*   RBCUA 1   WBCUA >100*   BACTERIA Negative   SQUAMEPITHEL 0   HYALINECASTS 2*       Significant Imaging: I have reviewed all pertinent imaging results/findings within the past 24 hours.    X-Ray Chest 1 View    Result Date: 11/17/2022  Reason: central line placement FINDINGS: Portable chest with comparison chest x-ray November 16, 2022 show normal cardiomediastinal silhouette. Left internal jugular central venous catheter tip is in the anatomic region of the SVC. No pneumothorax. Lungs are clear. Pulmonary vasculature is normal. No acute osseous abnormality. Left upper extremity vascular stent again noted. IMPRESSION: Left internal jugular central venous catheter tip is in the anatomic region of the SVC. No pneumothorax. Electronically signed by:  Elias Sherwood DO  11/17/2022 6:32 AM CST Workstation: [id number]    X-Ray Chest AP Portable    Result Date: 11/16/2022  XR CHEST 1 VIEW CLINICAL HISTORY: 72 years Female Sepsis COMPARISON: September 26, 2022 FINDINGS: Cardiac silhouette size is within normal limits. Atherosclerotic calcification of the aorta. No confluent airspace disease. No large pleural effusion or pneumothorax. No acute osseous abnormality. Vascular stent or graft within the left upper arm. Right upper quadrant cholecystectomy clips. IMPRESSION: No acute pulmonary process. Electronically signed by:  Justino Rivera MD  11/16/2022 1:57 PM CST Workstation: [id number]    XR Ribs Min 3 views w/PA  Chest Left    Result Date: 10/29/2022  PA chest with left RIBS CLINICAL DATA: Trauma FINDINGS: PA view of the chest is compared to September 2022. Heart size is upper normal. The aortic arch is calcified. Bilateral interstitial prominence is unchanged and likely on the basis of mild fibrosis. There are no pleural effusions. There is no evidence of pneumothorax. 3 images targeted to the left ribs demonstrate acute nondisplaced fractures of the left 11th rib posteriorly and posterolaterally. IMPRESSION: 1. Acute left 11th rib fracture. No pneumothorax or pleural effusion. 2. Chronic appearing mild bilateral abnormal interstitial prominence. Electronically signed by:  Dixon Thomas MD  10/29/2022 12:00 PM CDT Workstation: [id number]    CT Abdomen Pelvis  Without Contrast    Result Date: 11/16/2022  CMS MANDATED QUALITY DATA - CT RADIATION - 436 All CT scans at this facility utilize dose modulation, iterative reconstruction, and/or weight based dosing when appropriate to reduce radiation dose to as low as reasonably achievable. Reason: hypotension, recent colostomy TECHNIQUE: CT abdomen and pelvis without IV or oral contrast. Study is tailored for detection of urinary tract calculi and evaluation of solid organs, hollow viscera, and vascular structures is limited. COMPARISON: 9/7/2022 CT ABDOMEN: Coronary artery calcifications partially visualized. Linear opacities in lung bases suggesting minor scarring or atelectasis. Diffuse pneumatosis intestinalis affects the ascending, transverse, and descending colon, extending to level of left lower quadrant colostomy. Colostomy is new since the prior exam. No free intraperitoneal gas. Portal venous gas is present within the liver, primarily affecting the left hepatic lobe. Surgical clips in gallbladder fossa indicate cholecystectomy. Noncontrast pancreas, spleen, and adrenals are normal. Medullary nephrocalcinosis occurs bilaterally. Right ureteral stent is in place, with  multifocal right ureteral calcifications occurring near level of iliac vessel crossing measuring up to 6 mm in size remaining similar to that on prior exam. No dilated small intestines identified. Anastomosis of proximal colon in right superior pelvis appears intact. Moderate aortoiliac calcifications unchanged. Diffuse degenerative changes affect the spine with convex left thoracolumbar spine curvature. CT PELVIS: Bladder is otherwise unremarkable. Uterus has been removed. Presacral fat stranding is unchanged. No free pelvic fluid. No acute osseous abnormality. IMPRESSION: 1. Persistent pneumatosis intestinalis affecting colon, with interval placement of left lower quadrant colostomy since 9/7/2022, with extensive pneumatosis remaining similar to that on the prior exam. Pneumatosis along with persistent portal venous gas in liver, suggest ischemic enteritis. 2. Coronary artery calcifications. 3. Bilateral medullary nephrocalcinosis. 4. Unchanged right ureteral stent along with right ureteral calculi measuring up to 6 mm. RESULT NOTIFICATION: These observations were discussed by Dr. Epperson with, and acknowledged by, Dr. Lynch at 11/16/2022 7:30 PM CST Electronically signed by:  Wellington Epperson MD  11/16/2022 7:30 PM CST Workstation: [id number]

## 2022-11-17 NOTE — HPI
Ms. Hutchins is a 72-year-old female who was brought to the ED via EMS from outpatient dialysis center due to hypotension.  Patient with complicated recent history, about 2 months ago, admitted with ischemic colitis status post laparotomy with colostomy, stated she stayed in the hospital for about 1 month, she was discharge but had fall at assisted living facility and re-presented to the hospital following day, since then has had persistent hypotension, antihypertensives were discontinued, she was started on midodrine.  She was subsequently discharged to inpatient rehab and states she had returned to assisted living facility about 2 weeks ago.  Known history of ESRD on HD MWF.  She states blood pressure has been running low since previous admission, yesterday midodrine was increased from 5 mg t.i.d. to 10 mg t.i.d..  States her appetite has been low, over the last 24 hours has noticed decreased output from her colostomy, chronic nausea without any recent change, no significant increased abdominal pain.  States at dialysis today, near end of session, needle got dislodged from fistula, about 300 cc of blood loss, states following this she became lightheaded/dizzy, blood pressure 60/30 and she was referred to the ED.  States with dialysis recently they have not been removing fluid due to hypotension.  States she does have home health at assisted living facility.  In the ED blood pressure was low, 94/56, received fluid bolus, subsequently started on Levophed, during my encounter blood pressure 116/57 on Levophed 0.3 mcg/kg/min.  Patient is currently reporting back pain.  Labs with WBC 11.36, hemoglobin 8.6, platelet 162, potassium 1.9, magnesium 1.2, albumin 2.2, lactic acid 2.2, BUN/creatinine 5/2.4.  EKG reviewed.  Chest x-ray clear.  Previous echo with EF of 70%.  She received acetaminophen, magnesium, potassium, 472 cc fluid bolus as well as cefepime in the ED.  Case discussed with ED provider who requested admission.   Plan of care was discussed with patient, no visitors at bedside.  Address code status and patient confirms DNR.

## 2022-11-17 NOTE — PROGRESS NOTES
[facility] Medicine  Progress Note    Patient Name: Shara Hutchins  MRN: [id number]  Patient Class: IP- Inpatient   Admission Date: 11/16/2022  Length of Stay: 1 days  Attending Physician: Dana Ovalle MD  Primary Care Provider: April Horton MD        Subjective:     Principal Problem:Status post total colectomy        HPI:  Ms. Hutchins is a 72-year-old female who was brought to the ED via EMS from outpatient dialysis center due to hypotension.  Patient with complicated recent history, about 2 months ago, admitted with ischemic colitis status post laparotomy with colostomy, stated she stayed in the hospital for about 1 month, she was discharge but had fall at assisted living facility and re-presented to the hospital following day, since then has had persistent hypotension, antihypertensives were discontinued, she was started on midodrine.  She was subsequently discharged to inpatient rehab and states she had returned to assisted living facility about 2 weeks ago.  Known history of ESRD on HD MWF.  She states blood pressure has been running low since previous admission, yesterday midodrine was increased from 5 mg t.i.d. to 10 mg t.i.d..  States her appetite has been low, over the last 24 hours has noticed decreased output from her colostomy, chronic nausea without any recent change, no significant increased abdominal pain.  States at dialysis today, near end of session, needle got dislodged from fistula, about 300 cc of blood loss, states following this she became lightheaded/dizzy, blood pressure 60/30 and she was referred to the ED.  States with dialysis recently they have not been removing fluid due to hypotension.  States she does have home health at assisted living facility.  In the ED blood pressure was low, 94/56, received fluid bolus, subsequently started on Levophed, during my encounter blood pressure 116/57 on Levophed 0.3 mcg/kg/min.  Patient is currently reporting back pain.   Labs with WBC 11.36, hemoglobin 8.6, platelet 162, potassium 1.9, magnesium 1.2, albumin 2.2, lactic acid 2.2, BUN/creatinine 5/2.4.  EKG reviewed.  Chest x-ray clear.  Previous echo with EF of 70%.  She received acetaminophen, magnesium, potassium, 472 cc fluid bolus as well as cefepime in the ED.  Case discussed with ED provider who requested admission.  Plan of care was discussed with patient, no visitors at bedside.  Address code status and patient confirms DNR.      Overview/Hospital Course:  Patient presented with worsening hypotension from outpatient dialysis center.  Recent complicated past medical history.  She received IV fluid and was started on Levophed.  CTA abdomen and pelvis obtained with diffuse pneumatosis intestinal most prominent ascending, transverse and descending colon with portal vein gas.  She was started on broad-spectrum antibiotics.  General surgery was consulted and underwent emergent exploratory laparotomy with total colectomy, creation of ileostomy, inadvertent bladder injury requiring repair.  She was extubated postprocedure and transferred to the ICU.  Received 1 unit PRBC and FFP.  On 11/17 reports nausea, abdominal pain, still on Levophed.      Interval History:  See hospital course.  Patient reports abdominal pain most prominent bilateral sides, moderate to severe, persistent.  Admits to severe nausea without emesis.  Denies any shortness of breath.  Remains on Levophed for blood pressure support.  On nasal cannula.  Afebrile.  Labs with WBC 9.35, hemoglobin 8, potassium 3.4, magnesium 1.9, lactic acid 3.3.  CT on admission reviewed.  Discussed with patient.  No visitors at bedside.  Discussed with nursing.    Review of Systems   Constitutional:  Negative for fever.   Respiratory:  Negative for shortness of breath.    Gastrointestinal:  Positive for abdominal pain and nausea.   Genitourinary:         Has Godwin catheter in place   Musculoskeletal:  Positive for back pain.    Neurological:  Positive for weakness.   Psychiatric/Behavioral:  Negative for confusion.    Objective:     Vital Signs (Most Recent):  Temp: 98.4 °F (36.9 °C) (11/17/22 0530)  Pulse: 87 (11/17/22 0900)  Resp: (!) 33 (11/17/22 0900)  BP: (!) 138/56 (11/17/22 0900)  SpO2: 99 % (11/17/22 0900)   Vital Signs (24h Range):  Temp:  [97.8 °F (36.6 °C)-98.4 °F (36.9 °C)] 98.4 °F (36.9 °C)  Pulse:  [70-92] 87  Resp:  [10-33] 33  SpO2:  [89 %-100 %] 99 %  BP: ()/(43-83) 138/56  Arterial Line BP: ()/(34-54) 127/47     Weight: 47.2 kg (104 lb 0.9 oz)  Body mass index is 16.3 kg/m².    Intake/Output Summary (Last 24 hours) at 11/17/2022 0935  Last data filed at 11/17/2022 0615  Gross per 24 hour   Intake 1378.25 ml   Output --   Net 1378.25 ml      Physical Exam  Vitals and nursing note reviewed.   Constitutional:       Comments: Elderly frail female lying in bed, ill appearing, cooperative   HENT:      Head: Normocephalic and atraumatic.      Mouth/Throat:      Mouth: Mucous membranes are moist.      Comments: Chapped lips  Eyes:      General:         Right eye: No discharge.         Left eye: No discharge.   Neck:      Comments: LIJ in place  Cardiovascular:      Rate and Rhythm: Normal rate and regular rhythm.      Comments: Wearing SCDs  Pulmonary:      Comments: On NC, decreased at bases, no wheeze, no accessory muscle use  Abdominal:      Comments: Scaphoid, ileostomy RLQ, surgical dressing in place, no BS    Genitourinary:     Comments: Godwin in place with minimal bloody urine  Skin:     Comments: Right radial arterial line, LUE AVF with dressing    Neurological:      Mental Status: She is alert and oriented to person, place, and time.      Comments: +generalized weakness   Psychiatric:         Mood and Affect: Mood normal.       Significant Labs: Blood Culture:   Recent Labs   Lab 11/16/22  1313 11/16/22  1322   LABBLOO No Growth to date No Growth to date     BMP:   Recent Labs   Lab 11/17/22  0142  11/17/22  0839   * 137*   * 126*   K 3.4* 3.9   CL 99 95   CO2 25 23   BUN 9  --    CREATININE 3.4*  --    CALCIUM 7.0* 7.3*   MG 1.9  --      CBC:   Recent Labs   Lab 11/16/22 2056 11/16/22 2216 11/17/22  0024 11/17/22  0145 11/17/22  0839   WBC 16.66*  --   --  9.35 13.12*   HGB 8.8*  --   --  8.0* 9.9*   HCT 28.1*   < > 24* 25.6* 31.1*     --   --  189 151    < > = values in this interval not displayed.     CMP:   Recent Labs   Lab 11/16/22  1318 11/16/22 2110 11/17/22 0145 11/17/22  0839    133* 132* 126*   K 1.9* 2.6* 3.4* 3.9    95 99 95   CO2 28 30* 25 23   GLU 96 161* 169* 137*   BUN 5* 9 9  --    CREATININE 2.4* 3.4* 3.4*  --    CALCIUM 6.2* 7.3* 7.0* 7.3*   PROT 4.8*  --  4.3*  --    ALBUMIN 2.2*  --  2.1*  --    BILITOT 0.9  --  1.4*  --    ALKPHOS 97  --  126  --    AST 15  --  73*  --    ALT 10  --  23  --    ANIONGAP 10 8 8 8     Cardiac Markers:   Recent Labs   Lab 11/16/22  1318   *     Lactic Acid:   Recent Labs   Lab 11/16/22  1727 11/16/22 2124 11/17/22  0145   LACTATE 1.5 2.2* 3.3*     Magnesium:   Recent Labs   Lab 11/16/22  1318 11/16/22 2110 11/17/22  0145   MG 1.2* 2.2 1.9     POCT Glucose: No results for input(s): POCTGLUCOSE in the last 48 hours.  Troponin:   Recent Labs   Lab 11/16/22  1318 11/16/22 2110   TROPONINIHS 13.5 16.3*     TSH:   Recent Labs   Lab 11/16/22  1318   TSH 2.820     Urine Culture:   Recent Labs   Lab 11/16/22 2111   LABURIN No growth to date     Urine Studies:   Recent Labs   Lab 11/16/22 2111   COLORU Yellow   APPEARANCEUA Cloudy*   PHUR 8.0   SPECGRAV 1.010   PROTEINUA 3+*   GLUCUA Negative   KETONESU Negative   BILIRUBINUA Negative   OCCULTUA 2+*   NITRITE Negative   UROBILINOGEN Negative   LEUKOCYTESUR 3+*   RBCUA 1   WBCUA >100*   BACTERIA Negative   SQUAMEPITHEL 0   HYALINECASTS 2*       Significant Imaging: I have reviewed all pertinent imaging results/findings within the past 24 hours.    X-Ray Chest 1  View    Result Date: 11/17/2022  Reason: central line placement FINDINGS: Portable chest with comparison chest x-ray November 16, 2022 show normal cardiomediastinal silhouette. Left internal jugular central venous catheter tip is in the anatomic region of the SVC. No pneumothorax. Lungs are clear. Pulmonary vasculature is normal. No acute osseous abnormality. Left upper extremity vascular stent again noted. IMPRESSION: Left internal jugular central venous catheter tip is in the anatomic region of the SVC. No pneumothorax. Electronically signed by:  Elias Sherwood DO  11/17/2022 6:32 AM CST Workstation: [id number]    X-Ray Chest AP Portable    Result Date: 11/16/2022  XR CHEST 1 VIEW CLINICAL HISTORY: 72 years Female Sepsis COMPARISON: September 26, 2022 FINDINGS: Cardiac silhouette size is within normal limits. Atherosclerotic calcification of the aorta. No confluent airspace disease. No large pleural effusion or pneumothorax. No acute osseous abnormality. Vascular stent or graft within the left upper arm. Right upper quadrant cholecystectomy clips. IMPRESSION: No acute pulmonary process. Electronically signed by:  Justino Rivera MD  11/16/2022 1:57 PM CST Workstation: [id number]    XR Ribs Min 3 views w/PA Chest Left    Result Date: 10/29/2022  PA chest with left RIBS CLINICAL DATA: Trauma FINDINGS: PA view of the chest is compared to September 2022. Heart size is upper normal. The aortic arch is calcified. Bilateral interstitial prominence is unchanged and likely on the basis of mild fibrosis. There are no pleural effusions. There is no evidence of pneumothorax. 3 images targeted to the left ribs demonstrate acute nondisplaced fractures of the left 11th rib posteriorly and posterolaterally. IMPRESSION: 1. Acute left 11th rib fracture. No pneumothorax or pleural effusion. 2. Chronic appearing mild bilateral abnormal interstitial prominence. Electronically signed by:  Dixon Thomas MD  10/29/2022 12:00 PM CDT  Workstation: [id number]    CT Abdomen Pelvis  Without Contrast    Result Date: 11/16/2022  CMS MANDATED QUALITY DATA - CT RADIATION - 436 All CT scans at this facility utilize dose modulation, iterative reconstruction, and/or weight based dosing when appropriate to reduce radiation dose to as low as reasonably achievable. Reason: hypotension, recent colostomy TECHNIQUE: CT abdomen and pelvis without IV or oral contrast. Study is tailored for detection of urinary tract calculi and evaluation of solid organs, hollow viscera, and vascular structures is limited. COMPARISON: 9/7/2022 CT ABDOMEN: Coronary artery calcifications partially visualized. Linear opacities in lung bases suggesting minor scarring or atelectasis. Diffuse pneumatosis intestinalis affects the ascending, transverse, and descending colon, extending to level of left lower quadrant colostomy. Colostomy is new since the prior exam. No free intraperitoneal gas. Portal venous gas is present within the liver, primarily affecting the left hepatic lobe. Surgical clips in gallbladder fossa indicate cholecystectomy. Noncontrast pancreas, spleen, and adrenals are normal. Medullary nephrocalcinosis occurs bilaterally. Right ureteral stent is in place, with multifocal right ureteral calcifications occurring near level of iliac vessel crossing measuring up to 6 mm in size remaining similar to that on prior exam. No dilated small intestines identified. Anastomosis of proximal colon in right superior pelvis appears intact. Moderate aortoiliac calcifications unchanged. Diffuse degenerative changes affect the spine with convex left thoracolumbar spine curvature. CT PELVIS: Bladder is otherwise unremarkable. Uterus has been removed. Presacral fat stranding is unchanged. No free pelvic fluid. No acute osseous abnormality. IMPRESSION: 1. Persistent pneumatosis intestinalis affecting colon, with interval placement of left lower quadrant colostomy since 9/7/2022, with  extensive pneumatosis remaining similar to that on the prior exam. Pneumatosis along with persistent portal venous gas in liver, suggest ischemic enteritis. 2. Coronary artery calcifications. 3. Bilateral medullary nephrocalcinosis. 4. Unchanged right ureteral stent along with right ureteral calculi measuring up to 6 mm. RESULT NOTIFICATION: These observations were discussed by Dr. Epperson with, and acknowledged by, Dr. Lynch at 11/16/2022 7:30 PM CST Electronically signed by:  Wellington Epperson MD  11/16/2022 7:30 PM CST Workstation: [id number]         Assessment/Plan:      Active Hospital Problems    Diagnosis    *Status post total colectomy    Ischemic colitis s/p laprotomy with total colectomy and ileostomy, 11/17    Hypokalemia    Ischemic colon    Lactic acidosis    Hyponatremia    Bladder injury    Hypotension, acute on chronic    QT prolongation    ESRD on dialysis MWF    Bilateral LE neuropathy    Severe malnutrition    Hypothyroidism    Anemia of chronic disease    Hisotry of Vulvar cancer     Plan:  Continue care in ICU with continuous cardiac telemetry monitoring  Postop care as per General surgery  NPO except for medications   Discontinue normal saline IV fluid, consult nutrition for TPN   Continue Levophed, titrate for map greater than 65   Discontinue vancomycin, continue cefepime for ischemic colitis, adjust as needed   Serial abdominal examination   Needing lactic acid q.4 hours until less than 2   P.r.n. antiemetic and analgesic adjusted, monitoring   Previous echo with EF of 70%   Renally dosing all medications and avoid nephrotoxin drugs  Keep Godwin catheter in place with bladder injury   Fall and delirium precautions  A.m. labs ordered  Appreciate general surgery input  Nephrology consulted, ESRD  Urology consulted, bladder injury   Further plan as per clinical course    VTE Risk Mitigation (From admission, onward)         Ordered     IP VTE HIGH RISK PATIENT  Once         11/16/22  1845     Place sequential compression device  Until discontinued         11/16/22 1845                Discharge Planning   RODRIGO:      Code Status: DNR   Is the patient medically ready for discharge?:     Reason for patient still in hospital (select all that apply): Patient unstable                     Dana Ovalle MD  Department of Hospital Medicine   Counts include 234 beds at the [facility]

## 2022-11-17 NOTE — PLAN OF CARE
Problem: Adult Inpatient Plan of Care  Goal: Plan of Care Review  Outcome: Ongoing, Progressing  Goal: Patient-Specific Goal (Individualized)  Outcome: Ongoing, Progressing  Goal: Absence of Hospital-Acquired Illness or Injury  Outcome: Ongoing, Progressing  Goal: Optimal Comfort and Wellbeing  Outcome: Ongoing, Progressing  Goal: Readiness for Transition of Care  Outcome: Ongoing, Progressing     Problem: Adjustment to Illness (Sepsis/Septic Shock)  Goal: Optimal Coping  Outcome: Ongoing, Progressing     Problem: Bleeding (Sepsis/Septic Shock)  Goal: Absence of Bleeding  Outcome: Ongoing, Progressing     Problem: Infection Progression (Sepsis/Septic Shock)  Goal: Absence of Infection Signs and Symptoms  Outcome: Ongoing, Progressing     Problem: Glycemic Control Impaired (Sepsis/Septic Shock)  Goal: Blood Glucose Level Within Desired Range  Outcome: Ongoing, Progressing     Problem: Nutrition Impaired (Sepsis/Septic Shock)  Goal: Optimal Nutrition Intake  Outcome: Ongoing, Progressing

## 2022-11-18 PROBLEM — E87.20 LACTIC ACIDOSIS: Status: RESOLVED | Noted: 2022-01-01 | Resolved: 2022-01-01

## 2022-11-18 PROBLEM — E87.6 HYPOKALEMIA: Status: RESOLVED | Noted: 2022-01-01 | Resolved: 2022-01-01

## 2022-11-18 NOTE — PLAN OF CARE
Critical access hospital  Initial Discharge Assessment       Primary Care Provider: April Horton MD    Admission Diagnosis: Hypokalemia [E87.6]  Hypotension [I95.9]    Admission Date: 11/16/2022  Expected Discharge Date: TBD    Patient is a resident of [facility]. Assessment completed with Rajwinder @ facility. Requests home health if appropriate.          Payor: [employer] / Plan: [facility] [employer] / Product Type: Government /     Extended Emergency Contact Information  Primary Emergency Contact: demario jimenez  Mobile Phone: [phone]  Relation: Daughter   needed? No  Secondary Emergency Contact: Stephanie Aguiar   [facility]  Home Phone: [phone]  Relation: Relative   needed? No    Discharge Plan A: Home Health  Discharge Plan B: Home Health      [id number] [place] - [address]  [address] [facility] [address]  Phone: [phone] Fax: [phone]    [facility] - [place], [place] - [address]  [address]  [address]  [place] [address]  Phone: [phone] Fax: [phone]      Initial Assessment (most recent)       Adult Discharge Assessment - 11/18/22 0951          Discharge Assessment    Assessment Type Discharge Planning Assessment     Confirmed/corrected address, phone number and insurance Yes     Confirmed Demographics Correct on Facesheet     Source of Information health record   [facility] Staff via phone    Communicated RODRIGO with patient/caregiver Date not available/Unable to determine     Reason For Admission Status post total colectomy     Lives With facility resident     Facility Arrived From: [facility]     Do you expect to return to your current living situation? Yes     Do you have help at home or someone to help you manage your care at home? Yes     Who are your caregiver(s) and their phone number(s)? staff at facility     Prior to hospitilization  cognitive status: Alert/Oriented     Current cognitive status: Alert/Oriented     Readmission within 30 days? No     Patient currently being followed by outpatient case management? No     Do you currently have service(s) that help you manage your care at home? No     Do you take prescription medications? Yes     Do you have prescription coverage? Yes     Do you have any problems affording any of your prescribed medications? No     Is the patient taking medications as prescribed? yes     Who is going to help you get home at discharge? Linda provides transportation if necessary     Are you on dialysis? Yes     Dialysis Name and Scheduled days [facility] [place] leaves the facility at 830     Discharge Plan A Home Health     Discharge Plan B Home Health     DME Needed Upon Discharge  --   TBD    Discharge Plan discussed with: --   Rajwinder @ [facility]

## 2022-11-18 NOTE — SUBJECTIVE & OBJECTIVE
Interval History:  Patient continues to report severe generalized abdominal pain associated with nausea, no emesis this morning, no output from ileostomy.  At times intermittently confused, possibly related to IV pain medication.  Still on Levophed, currently undergoing hemodialysis and plans to run even today.  Denies any significant shortness of breath.  On nasal cannula, T-max 99.5°.  Labs with WBC 11.30, hemoglobin 8.4, platelet 94, sodium 125, potassium 4, magnesium 1.7, albumin 1.9.  Echo with EF of 75%.  Culture negative.  Discussed with patient.  Discussed with nursing.    Review of Systems   Constitutional:  Negative for fever.   Respiratory:  Negative for shortness of breath.    Gastrointestinal:  Positive for abdominal pain and nausea.   Neurological:  Positive for weakness.   Psychiatric/Behavioral:  Positive for confusion (intermittent).    Objective:     Vital Signs (Most Recent):  Temp: 98.6 °F (37 °C) (11/18/22 0800)  Pulse: 79 (11/18/22 0800)  Resp: (!) 22 (11/18/22 0807)  BP: 136/64 (11/18/22 0800)  SpO2: 97 % (11/18/22 0800)   Vital Signs (24h Range):  Temp:  [98.2 °F (36.8 °C)-99.5 °F (37.5 °C)] 98.6 °F (37 °C)  Pulse:  [79-94] 79  Resp:  [10-43] 22  SpO2:  [91 %-100 %] 97 %  BP: ()/(39-93) 136/64  Arterial Line BP: (109-170)/(39-51) 109/39     Weight: 47.2 kg (104 lb 0.9 oz)  Body mass index is 16.3 kg/m².    Intake/Output Summary (Last 24 hours) at 11/18/2022 0854  Last data filed at 11/18/2022 0506  Gross per 24 hour   Intake 1539.46 ml   Output --   Net 1539.46 ml      Physical Exam  Vitals and nursing note reviewed.   Constitutional:       Comments: Elderly frail female lying in bed, ill appearing, cooperative   HENT:      Head: Normocephalic and atraumatic.      Mouth/Throat:      Mouth: Mucous membranes are moist.      Comments: Chapped lips  Eyes:      General:         Right eye: No discharge.         Left eye: No discharge.   Neck:      Comments: LIJ in place  Cardiovascular:       Rate and Rhythm: Normal rate and regular rhythm.      Comments: Wearing SCDs  Pulmonary:      Comments: On NC, decreased at bases, no wheeze, no accessory muscle use  Abdominal:      Tenderness: There is abdominal tenderness.      Comments: Scaphoid, ileostomy RLQ, surgical dressing in place, no BS    Genitourinary:     Comments: Godwin in place with minimal bloody urine  Skin:     Comments: Right radial arterial line, LUE AVF - connected.  Bruising upper extremities   Neurological:      Mental Status: She is alert and oriented to person, place, and time.      Comments: +generalized weakness   Psychiatric:         Mood and Affect: Mood normal.       Significant Labs: BMP:   Recent Labs   Lab 11/18/22  0400   *   *   K 4.0   CL 95   CO2 22*   BUN 16   CREATININE 4.4*   CALCIUM 7.6*   MG 1.7     CBC:   Recent Labs   Lab 11/17/22 0145 11/17/22  0839 11/18/22  0400   WBC 9.35 13.12* 11.30   HGB 8.0* 9.9* 8.4*   HCT 25.6* 31.1* 25.6*    151 94*     CMP:   Recent Labs   Lab 11/17/22 0145 11/17/22  0839 11/18/22  0400   * 126* 125*   K 3.4* 3.9 4.0   CL 99 95 95   CO2 25 23 22*   * 137* 146*   BUN 9 10 16   CREATININE 3.4* 3.3* 4.4*   CALCIUM 7.0* 7.3* 7.6*   PROT 4.3* 4.9* 4.5*   ALBUMIN 2.1* 2.3* 1.9*   BILITOT 1.4* 3.7* 4.9*   ALKPHOS 126 128 115   AST 73* 39 36   ALT 23 22 23   ANIONGAP 8 8 8     Cardiac Markers:   Recent Labs   Lab 11/16/22  1318   *     Lactic Acid:   Recent Labs   Lab 11/16/22 2124 11/17/22 0145 11/17/22  0839   LACTATE 2.2* 3.3* 2.2*     Magnesium:   Recent Labs   Lab 11/17/22 0145 11/17/22  0839 11/18/22  0400   MG 1.9 1.8 1.7     TSH:   Recent Labs   Lab 11/16/22  1318   TSH 2.820     Urine Culture:   Recent Labs   Lab 11/16/22 2111   LABURIN No growth to date     Urine Studies:   Recent Labs   Lab 11/16/22 2111   COLORU Yellow   APPEARANCEUA Cloudy*   PHUR 8.0   SPECGRAV 1.010   PROTEINUA 3+*   GLUCUA Negative   KETONESU Negative   BILIRUBINUA  Negative   OCCULTUA 2+*   NITRITE Negative   UROBILINOGEN Negative   LEUKOCYTESUR 3+*   RBCUA 1   WBCUA >100*   BACTERIA Negative   SQUAMEPITHEL 0   HYALINECASTS 2*       Significant Imaging: I have reviewed all pertinent imaging results/findings within the past 24 hours.    Echocardiogram  Summary    The left ventricle is normal in size with mild concentric hypertrophy and hyperdynamic systolic function.  The estimated ejection fraction is 75%.  Normal right ventricular size with normal right ventricular systolic function.  Mild mitral regurgitation.  Normal central venous pressure (3 mmHg).

## 2022-11-18 NOTE — PLAN OF CARE
POC reviewed with patient. Afebrile. Intermittent confusion to time and place. Dialysis done this am. Tolerated well. Remains on levophed for pressure support, attempted to titrate down. Abdomen remains tender. Tinkling distant bowel sounds. No output in ileostomy- Dr. Broderick aware. Morphine prn pain to abdomen. TPN for nutrition support. Sips of water with oral meds. No nausea.  Antibiotics as ordered. Godwin to gravity with minimal dark brown output. Safety measures in place.     Problem: Adult Inpatient Plan of Care  Goal: Plan of Care Review  Outcome: Ongoing, Progressing     Problem: Adult Inpatient Plan of Care  Goal: Optimal Comfort and Wellbeing  Outcome: Ongoing, Progressing     Problem: Nutrition Impaired (Sepsis/Septic Shock)  Goal: Optimal Nutrition Intake  Outcome: Ongoing, Progressing

## 2022-11-18 NOTE — HPI
Shara Hutchins is a 72 y.o. female brought to ED yesterday with hypotension at outpatient dialysis.     Patient with complicated recent history, about 2 months ago, admitted with ischemic colitis status post laparotomy with colostomy, stated she stayed in the hospital for about 1 month, she was discharge but had fall at assisted living facility and re-presented to the hospital following day. She was subsequently discharged to inpatient rehab and states she had returned to assisted living facility about 2 weeks ago.  Known history of ESRD on HD MWF.     In the ED blood pressure was low, 94/56, received fluid bolus, subsequently started on Levophed. Labs with WBC 11.36, hemoglobin 8.6, platelet 162, potassium 1.9, magnesium 1.2, albumin 2.2, lactic acid 2.2, BUN/creatinine 5/2.4.    She has an extensive hx of stones and ureteral stricture secondary to hx of radiation for vulvar cancer. Most recently taken to OR by Dr. Barber on 8/30/22. She makes minimal urine. She is managed with serial stent exchanges.     CT imaging that showed pneumatosis of the colon and portal venous gas in the left liver. No hydro is noted. The right ureteral stent is in good position. She was taken urgently to the OR by Dr. Broderick. During procedure a cystotomy was made. Stent coil was visualized within the bladder. Bladder was repaired in 2 layers. No JOHN drain was placed.

## 2022-11-18 NOTE — PLAN OF CARE
Drowsy, easily arouses. Disoriented to time. Respirations unlabored. Abdomen flat. Dressing D/I. Ileostomy intact. NPO. TPN ordered. Godwin in place, scant dark urine with sediment. Titrating Levo as tolerated.   Safety maintained.   Plans for dialysis tomorrow.

## 2022-11-18 NOTE — CONSULTS
[facility]  Urology  Consult Note    Patient Name: Shara Hutchins  MRN: [id number]  Admission Date: 11/16/2022  Hospital Length of Stay: 1   Code Status: DNR   Attending Provider: Dana Ovalle MD   Consulting Provider: Chapis Rodriguez MD  Primary Care Physician: April Horton MD  Principal Problem:Status post total colectomy    Inpatient consult to Urology  Consult performed by: Chapis Rodriguez MD  Consult ordered by: Kannan Broderick MD          Subjective:     HPI:  Shara Hutchins is a 72 y.o. female brought to ED yesterday with hypotension at outpatient dialysis.     Patient with complicated recent history, about 2 months ago, admitted with ischemic colitis status post laparotomy with colostomy, stated she stayed in the hospital for about 1 month, she was discharge but had fall at assisted living facility and re-presented to the hospital following day. She was subsequently discharged to inpatient rehab and states she had returned to assisted living facility about 2 weeks ago.  Known history of ESRD on HD MWF.     In the ED blood pressure was low, 94/56, received fluid bolus, subsequently started on Levophed. Labs with WBC 11.36, hemoglobin 8.6, platelet 162, potassium 1.9, magnesium 1.2, albumin 2.2, lactic acid 2.2, BUN/creatinine 5/2.4.    She has an extensive hx of stones and ureteral stricture secondary to hx of radiation for vulvar cancer. Most recently taken to OR by Dr. Barber on 8/30/22. She makes minimal urine. She is managed with serial stent exchanges.     CT imaging that showed pneumatosis of the colon and portal venous gas in the left liver. No hydro is noted. The right ureteral stent is in good position. She was taken urgently to the OR by Dr. Broderick. During procedure a cystotomy was made. Stent coil was visualized within the bladder. Bladder was repaired in 2 layers. No JOHN drain was placed.       Past Medical History:   Diagnosis Date    Cellulitis of trunk     Perineum,  numerous episodes    Chronic diarrhea 02/10/2020    Short-gut syndrome    End stage renal disease on dialysis 01/07/2018    Fatigue     Hypertension     Hypothyroidism 02/10/2020    Iron deficiency anemia due to chronic blood loss 01/07/2018    Pulmonary hypertension 03/20/2020    Vulvar cancer 01/07/2019       Past Surgical History:   Procedure Laterality Date    ABDOMINAL SURGERY      ANGIOGRAPHY OF ARTERIOVENOUS SHUNT N/A 01/19/2022    Procedure: Fistulogram with Possible Intervention;  Surgeon: Raz Maher MD;  Location: [facility] CATH/EP LAB;  Service: Cardiothoracic;  Laterality: N/A;    APPENDECTOMY      AV FISTULA PLACEMENT Left 2018    BACK SURGERY      BLADDER FULGURATION N/A 06/16/2020    Procedure: FULGURATION, BLADDER;  Surgeon: Damari Barber Jr., MD;  Location: [facility] OR;  Service: Urology;  Laterality: N/A;    BLADDER REPAIR  11/16/2022    Procedure: REPAIR, BLADDER;  Surgeon: Kannan Broderick MD;  Location: [facility] OR;  Service: General;;    carpel tunnel surgery once on each hand      CERVICAL FUSION      x 4    cervix repair      CHOLECYSTECTOMY  2000    COLECTOMY      COLECTOMY, TOTAL  11/16/2022    Procedure: COLECTOMY, TOTAL;  Surgeon: Kannan Broderick MD;  Location: [facility] OR;  Service: General;;    CYSTOSCOPY W/ RETROGRADES  06/16/2020    Procedure: CYSTOSCOPY, WITH RETROGRADE PYELOGRAM;  Surgeon: Damari Barber Jr., MD;  Location: [facility] OR;  Service: Urology;;    CYSTOSCOPY W/ RETROGRADES Right 04/20/2021    Procedure: CYSTOSCOPY, WITH RETROGRADE PYELOGRAM;  Surgeon: Damari Barber Jr., MD;  Location: [facility] OR;  Service: Urology;  Laterality: Right;    CYSTOSCOPY W/ URETERAL STENT PLACEMENT Right 03/16/2021    Procedure: CYSTOSCOPY, WITH URETERAL STENT INSERTION;  Surgeon: Damari Barber Jr., MD;  Location: [facility] OR;  Service: Urology;  Laterality: Right;    CYSTOSCOPY W/ URETERAL STENT PLACEMENT Right 05/18/2021    Procedure: CYSTOSCOPY, WITH URETERAL STENT INSERTION;  Surgeon: Damari Barber Jr., MD;   Location: [facility] OR;  Service: Urology;  Laterality: Right;    CYSTOSCOPY W/ URETERAL STENT PLACEMENT Right 06/22/2022    Procedure: CYSTOSCOPY, WITH URETERAL STENT INSERTION;  Surgeon: Gaby Garcia MD;  Location: [facility] OR;  Service: Urology;  Laterality: Right;  cysto stent exchange    CYSTOSCOPY W/ URETERAL STENT REMOVAL Right 04/20/2021    Procedure: CYSTOSCOPY, WITH URETERAL STENT REMOVAL;  Surgeon: Damari Barber Jr., MD;  Location: [facility] OR;  Service: Urology;  Laterality: Right;    CYSTOSCOPY WITH URETEROSCOPY, RETROGRADE PYELOGRAPHY, AND INSERTION OF STENT Right 03/30/2021    Procedure: CYSTOSCOPY, WITH RETROGRADE PYELOGRAM AND URETERAL STENT INSERTION;  Surgeon: Damari Barber Jr., MD;  Location: [facility] OR;  Service: Urology;  Laterality: Right;    CYSTOURETEROSCOPY WITH RETROGRADE PYELOGRAPHY AND INSERTION OF STENT INTO URETER Right 01/18/2022    Procedure: CYSTOURETEROSCOPY, WITH RETROGRADE PYELOGRAM AND URETERAL STENT INSERTION;  Surgeon: Damari Barber Jr., MD;  Location: [facility];  Service: Urology;  Laterality: Right;    CYSTOURETEROSCOPY WITH RETROGRADE PYELOGRAPHY AND INSERTION OF STENT INTO URETER Right 8/30/2022    Procedure: CYSTOURETEROSCOPY, WITH RETROGRADE PYELOGRAM AND URETERAL STENT INSERTION;  Surgeon: Damari Barber Jr., MD;  Location: [facility];  Service: Urology;  Laterality: Right;    DIAGNOSTIC LAPAROSCOPY N/A 02/13/2020    Procedure: LAPAROSCOPY, DIAGNOSTIC;  Surgeon: Robbi Lovell III, MD;  Location: [facility];  Service: General;  Laterality: N/A;    HYSTERECTOMY  1980    ILEOSTOMY N/A 02/13/2020    Procedure: CREATION, ILEOSTOMY Loop;  Surgeon: Robbi Lovell III, MD;  Location: [facility] OR;  Service: General;  Laterality: N/A;    ILEOSTOMY CLOSURE N/A 05/18/2020    Procedure: CLOSURE, ILEOSTOMY;  Surgeon: Robbi Lovell III, MD;  Location: [facility];  Service: General;  Laterality: N/A;    LASER LITHOTRIPSY Right 03/30/2021    Procedure: LITHOTRIPSY, USING LASER;  Surgeon:  Damari Barber Jr., MD;  Location: [facility] OR;  Service: Urology;  Laterality: Right;    LUMBAR LAMINECTOMY      LYSIS OF ADHESIONS N/A 02/13/2020    Procedure: LYSIS, ADHESIONS;  Surgeon: Robbi Lovell III, MD;  Location: [facility] OR;  Service: General;  Laterality: N/A;    NECK SURGERY      OSTEOTOMY  9/7/2022    Procedure: OSTEOTOMY;  Surgeon: Brigette Chavez MD;  Location: [facility] OR;  Service: General;;    PERCUTANEOUS TRANSLUMINAL ANGIOPLASTY OF ARTERIOVENOUS FISTULA N/A 01/19/2022    Procedure: PTA, AV FISTULA;  Surgeon: Raz Maher MD;  Location: [facility] CATH/EP LAB;  Service: Cardiothoracic;  Laterality: N/A;    PHLEBOGRAPHY  02/28/2020    Procedure: VENOGRAM;  Surgeon: Robbi Lovell III, MD;  Location: [facility] OR;  Service: General;;    REMOVAL OF VASCULAR ACCESS PORT Right 02/13/2020    Procedure: REMOVAL, VASCULAR ACCESS PORT;  Surgeon: Robbi Lovell III, MD;  Location: [facility] OR;  Service: General;  Laterality: Right;    RETROGRADE PYELOGRAPHY  03/16/2021    Procedure: PYELOGRAM, RETROGRADE;  Surgeon: Damari Barber Jr., MD;  Location: [facility] OR;  Service: Urology;;    RETROGRADE PYELOGRAPHY Right 05/18/2021    Procedure: PYELOGRAM, RETROGRADE;  Surgeon: Damari Barber Jr., MD;  Location: [facility] OR;  Service: Urology;  Laterality: Right;    RETROGRADE PYELOGRAPHY  06/22/2022    Procedure: PYELOGRAM, RETROGRADE;  Surgeon: Gaby Garcia MD;  Location: [facility] OR;  Service: Urology;;    SHOULDER SURGERY      TONSILLECTOMY      URETEROSCOPIC REMOVAL OF URETERIC CALCULUS Right 03/30/2021    Procedure: REMOVAL, CALCULUS, URETER, URETEROSCOPIC;  Surgeon: Damari Barber Jr., MD;  Location: [facility] OR;  Service: Urology;  Laterality: Right;    URETEROSCOPIC REMOVAL OF URETERIC CALCULUS Right 04/20/2021    Procedure: REMOVAL, CALCULUS, URETER, URETEROSCOPIC;  Surgeon: Damari Barber Jr., MD;  Location: [facility] OR;  Service: Urology;  Laterality: Right;    URETEROSCOPIC REMOVAL OF URETERIC CALCULUS Right  2022    Procedure: REMOVAL, CALCULUS, URETER, URETEROSCOPIC;  Surgeon: Damari Barber Jr., MD;  Location: [facility];  Service: Urology;  Laterality: Right;    vulva cancer         Review of patient's allergies indicates:   Allergen Reactions    Ciprofloxacin Other (See Comments)     Elevated liver enzymes      Pcn [penicillins] Anaphylaxis     TOLERATES ROCEPHIN WITHOUT DIFFICULTY       Family History       Problem Relation (Age of Onset)    Cancer Mother    Heart disease Mother, Father, Sister    Hypertension Mother    No Known Problems Daughter            Tobacco Use    Smoking status: Former     Packs/day: 1.00     Years: 33.00     Pack years: 33.00     Types: Cigarettes     Quit date: 2000     Years since quittin.8    Smokeless tobacco: Former     Quit date:    Substance and Sexual Activity    Alcohol use: No    Drug use: No    Sexual activity: Not on file       Review of Systems   HENT:  Negative for trouble swallowing.    Respiratory:  Negative for cough.    Gastrointestinal:  Positive for abdominal pain.   Genitourinary:  Positive for hematuria. Negative for difficulty urinating, dysuria and flank pain.     Objective:     Temp:  [97.8 °F (36.6 °C)-99.5 °F (37.5 °C)] 99.5 °F (37.5 °C)  Pulse:  [70-94] 88  Resp:  [10-43] 25  SpO2:  [89 %-100 %] 91 %  BP: ()/(39-93) 126/51  Arterial Line BP: ()/(34-54) 111/42     Body mass index is 16.3 kg/m².    Date 22 0700 - 22 0659   Shift 1080-7545 4862-7104 9308-7815 24 Hour Total   INTAKE   P.O.  0  0   I.V.(mL/kg)  550(11.7)  550(11.7)   IV Piggyback  50  50   Shift Total(mL/kg)  600(12.7)  600(12.7)   OUTPUT   Shift Total(mL/kg)       Weight (kg) 47.2 47.2 47.2 47.2          Drains       Drain  Duration                  Hemodialysis AV Fistula Left upper arm -- days         Ureteral Drain/Stent 22 Right ureter 7 Fr. 79 days         Closed/Suction Drain 22 1718 Right;Anterior RUQ Bulb 19 Fr. 71 days         Colostomy  09/07/22 1750 Descending/sigmoid LLQ 71 days                    Physical Exam  Vitals reviewed.   Constitutional:       General: She is not in acute distress.     Appearance: Normal appearance. She is ill-appearing. She is not toxic-appearing or diaphoretic.   HENT:      Head: Normocephalic and atraumatic.   Eyes:      General: No scleral icterus.  Cardiovascular:      Rate and Rhythm: Normal rate and regular rhythm.   Pulmonary:      Effort: Pulmonary effort is normal. No respiratory distress.   Abdominal:      General: There is distension.   Genitourinary:     Comments: Lee with minimal bloody urine   Psychiatric:         Mood and Affect: Mood normal.         Behavior: Behavior normal.       Significant Labs:    BMP:  Recent Labs   Lab 11/16/22 2110 11/17/22  0145 11/17/22  0839   * 132* 126*   K 2.6* 3.4* 3.9   CL 95 99 95   CO2 30* 25 23   BUN 9 9 10   CREATININE 3.4* 3.4* 3.3*   CALCIUM 7.3* 7.0* 7.3*       CBC:  Recent Labs   Lab 11/16/22 2056 11/16/22 2216 11/17/22  0024 11/17/22  0145 11/17/22  0839   WBC 16.66*  --   --  9.35 13.12*   HGB 8.8*  --   --  8.0* 9.9*   HCT 28.1*   < > 24* 25.6* 31.1*     --   --  189 151    < > = values in this interval not displayed.       Urine Culture:   Recent Labs   Lab 11/16/22 2111   LABURIN No growth to date     Urine Studies:   Recent Labs   Lab 11/16/22 2111   COLORU Yellow   APPEARANCEUA Cloudy*   PHUR 8.0   SPECGRAV 1.010   PROTEINUA 3+*   GLUCUA Negative   KETONESU Negative   BILIRUBINUA Negative   OCCULTUA 2+*   NITRITE Negative   UROBILINOGEN Negative   LEUKOCYTESUR 3+*   RBCUA 1   WBCUA >100*   BACTERIA Negative   SQUAMEPITHEL 0   HYALINECASTS 2*     All pertinent labs results from the past 24 hours have been reviewed.    Significant Imaging:  All pertinent imaging results/findings from the past 24 hours have been reviewed.          Assessment and Plan:     Bladder injury  - Maintain lee catheter x 2 weeks   - Nursing staff may irrigate  gently PRN for hematuria   - She makes minimal urine and is on dialysis   - Given hx of radiation will need a cystogram prior to removal  - She has not followed up with Dr. Barber since last surgery in August   - She will need stent exchange in next few months   - Will have her follow up with Dr. Barber following discharge with cystogram for lee removal   - If she remains in hospital, cystogram can be performed in 2 weeks and lee removed if negative        VTE Risk Mitigation (From admission, onward)           Ordered     IP VTE HIGH RISK PATIENT  Once         11/16/22 1845     Place sequential compression device  Until discontinued         11/16/22 1845                    Thank you for your consult. I will sign off. Please contact us if you have any additional questions.    Chapis Rodriguez MD  Urology  [facility]

## 2022-11-18 NOTE — ASSESSMENT & PLAN NOTE
- Maintain lee catheter x 2 weeks   - Nursing staff may irrigate gently PRN for hematuria   - She makes minimal urine and is on dialysis   - Given hx of radiation will need a cystogram prior to removal  - She has not followed up with Dr. Barber since last surgery in August   - She will need stent exchange in next few months   - Will have her follow up with Dr. Barber following discharge with cystogram for lee removal

## 2022-11-18 NOTE — PROGRESS NOTES
[facility]  Adult Nutrition   Progress Note (Nutrition Support Management)    SUMMARY     Recommendations  Recommendation/Intervention:   1) TPN ordered to be initiated @ 1700. Run @ 50 mL/hr.   2) Blood glucose and electrolytes to be monitored and corrected accordingly.   3) RD to monitor TPN infusion and tolerance. Monitor I/Os. Monitor and mange nutrition support daily.    Goals:   1) Pt to meet estimated needs via TPN provision.   2) BG and electrolytes to trend towards normal limits.  Nutrition Goal Status: new, progressing towards goal    Dietitian Rounds Brief  Pt remains w/ significant abdominal pain. No output in ileostomy. Pt to receive HD today. TPN to continue. New TPN ordered.    PARENTERAL NUTRITION PROGRESS NOTE:    Parenteral Nutrition Day # 2  Diagnosis/Indication for PN: S/p total colectomy, malnutrition  IV Access: Percutaneous Central Line  Diet/Tube Feeding: NPO     Admixture Type: 3-in-1  Infusion Rate and Frequency: 50 mL/hr  Patient Acuity: ICU     PN Composition:   65 grams Amino Acid, 195 grams Dextrose, and 50 grams Lipid.    Today's TPN provides 1423 kcal.  *Dextrose is started at less than full dextrose goal to reduce risk for Refeeding Syndrome. Dextrose content will be advanced as appropriate over the next few days.     Please see order for electrolytes and additives content and adjustments.   *The Nutrition Support Team will continue to monitor electrolytes daily and adjust parenteral nutrition as warranted.    Malnutrition Assessment:  Pt is w/ previous malnutrition d/x. Per chart review, pt weighted 156 lbs on 9/822 and now currently weights 104 lbs. Pt has lost 52 lbs within a 2 months time span (33% body wt loss) - severe. RD agrees w/ previous d/x s/t BMI of 16.28 kg/m2, and 52 lbs wt loss (33% body wt loss).      Severe malnutrition related to decreased intake in pt w/ increased needs d/t HD, cancer as evidenced by BMI of 16.28 kg/m2, 52 lbs wt loss (33% body wt  "loss), and < 75% of estimated energy requirement for > 1 month.     Diet order:   Current Diet Order: NPO      Current Nutrition Support Formula Ordered: Other (Comment) (Custom 3-in-1 TPN)  Current Nutrition Support Rate Ordered: 50 (ml)  Current Nutrition Support Frequency Ordered: mL/hr    Evaluation of Received Nutrient/Fluid Intake  Parenteral Calories (kcal): 1423  Parenteral Protein (gm): 65  Parenteral Fluid (mL): 1200  Lipid Calories (kcals): 500 kcals  Energy Calories Required: not meeting needs  Protein Required: not meeting needs  Fluid Required: not meeting needs  Tolerance: tolerating     % Intake of Estimated Energy Needs: 75 - 100 %  % Meal Intake: NPO      Intake/Output Summary (Last 24 hours) at 11/18/2022 1024  Last data filed at 11/18/2022 0506  Gross per 24 hour   Intake 1539.46 ml   Output --   Net 1539.46 ml        Anthropometrics  Temp: 98.6 °F (37 °C)  Height Method: Stated  Height: 5' 7" (170.2 cm)  Height (inches): 67 in  Weight Method: Bed Scale  Weight: 47.2 kg (104 lb 0.9 oz)  Weight (lb): 104.06 lb  Ideal Body Weight (IBW), Female: 135 lb  % Ideal Body Weight, Female (lb): 77.08 %  BMI (Calculated): 16.3  BMI Grade: 16 - 16.9 protein-energy malnutrition grade II       Estimated/Assessed Needs  Weight Used For Calorie Calculations: 47.2 kg (104 lb 0.9 oz)  Energy Calorie Requirements (kcal): 4068-1602 (35-40)  Energy Need Method: Kcal/kg  Protein Requirements: 57-71 (1.2-1.5 gm/kg)  Weight Used For Protein Calculations: 47.2 kg (104 lb 0.9 oz)  Fluid Requirements (mL): 24 hour UOP + 1000 mL     RDA Method (mL): 1652       Reason for Assessment  Reason For Assessment: consult, new TPN  Diagnosis: other (see comments) (Status post total colectomy)  Relevant Medical History: Vulvar cancer, hypotension, hypothyroidism, anemia of chronic disease, severe malnutrition, ESRD on dialysis MWF    Nutrition/Diet History  Food Allergies: NKFA  Factors Affecting Nutritional Intake: altered " gastrointestinal function, NPO, nausea/vomiting    Nutrition Risk Screen  Nutrition Risk Screen: other (see comments)     MST Score: 0  Have you recently lost weight without trying?: No  Weight loss score: 0  Have you been eating poorly because of a decreased appetite?: No  Appetite score: 0       Weight History:  Wt Readings from Last 5 Encounters:   11/17/22 47.2 kg (104 lb 0.9 oz)   11/17/22 47.2 kg (104 lb)   10/29/22 50.8 kg (112 lb)   09/15/22 70.8 kg (156 lb)   09/08/22 70.9 kg (156 lb 3.2 oz)        Medications:Pertinent Medications Reviewed  Scheduled Meds:   ceFEPime (MAXIPIME) IVPB  1 g Intravenous Q24H    chlorhexidine  15 mL Mouth/Throat BID    epoetin nica-ebpx (RETACRIT) injection  100 Units/kg Intravenous Every Mon, Wed, Fri    levothyroxine  88 mcg Oral Before breakfast    metronidazole  500 mg Intravenous Q8H    midodrine  5 mg Oral TID    mupirocin   Nasal BID     Continuous Infusions:   NORepinephrine bitartrate-D5W 0.35 mcg/kg/min (11/18/22 0813)    TPN ADULT CENTRAL LINE CUSTOM (3 in 1) 50 mL/hr at 11/18/22 0506     PRN Meds:.acetaminophen, calcium gluconate IVPB, calcium gluconate IVPB, calcium gluconate IVPB, dextrose 10%, dextrose 10%, glucagon (human recombinant), glucose, glucose, HYDROcodone-acetaminophen, HYDROmorphone, LIDOcaine HCl 2%, magnesium sulfate IVPB, magnesium sulfate IVPB, naloxone, ondansetron, potassium chloride in water **AND** potassium chloride in water **AND** potassium chloride in water, prochlorperazine, senna-docusate 8.6-50 mg, sodium chloride 0.9%, sodium phosphate IVPB, sodium phosphate IVPB, sodium phosphate IVPB    Labs: Pertinent Labs Reviewed  Clinical Chemistry:  Recent Labs   Lab 11/17/22  0839 11/18/22  0400   * 125*   K 3.9 4.0   CL 95 95   CO2 23 22*   * 146*   BUN 10 16   CREATININE 3.3* 4.4*   CALCIUM 7.3* 7.6*   PROT 4.9* 4.5*   ALBUMIN 2.3* 1.9*   BILITOT 3.7* 4.9*   ALKPHOS 128 115   AST 39 36   ALT 22 23   ANIONGAP 8 8   MG 1.8 1.7    PHOS 3.4 3.5     CBC:   Recent Labs   Lab 11/18/22  0400   WBC 11.30   RBC 2.50*   HGB 8.4*   HCT 25.6*   PLT 94*   *   MCH 33.6*   MCHC 32.8     Lipid Panel:  Recent Labs   Lab 11/18/22  0400   TRIG 96     Cardiac Profile:  Recent Labs   Lab 11/16/22  1318   *     Thyroid & Parathyroid:  Recent Labs   Lab 11/16/22  1318   TSH 2.820       Monitor and Evaluation  Food and Nutrient Intake: energy intake, parenteral nutrition intake  Food and Nutrient Adminstration: enteral and parenteral nutrition administration  Knowledge/Beliefs/Attitudes: food and nutrition knowledge/skill  Physical Activity and Function: nutrition-related ADLs and IADLs  Anthropometric Measurements: weight, weight change, body mass index  Biochemical Data, Medical Tests and Procedures: electrolyte and renal panel, gastrointestinal profile, glucose/endocrine profile, inflammatory profile, lipid profile  Nutrition-Focused Physical Findings: overall appearance     Nutrition Risk  Level of Risk/Frequency of Follow-up: high     Nutrition Follow-Up  RD Follow-up?: Yes      Alesia Rangel RD 11/18/2022 10:24 AM

## 2022-11-18 NOTE — NURSING
Dr Ovalle on unit.  Updated. Reviewed repeat labs.  Na 126.  IVF's d/'d. Consult dietician for TPN recommendation.

## 2022-11-18 NOTE — PROGRESS NOTES
INPATIENT NEPHROLOGY Progress Notes  [facility]    Patient Name: Shara Hutchins  Date: 11/18/2022    Reason for consultation: ESRD    Chief Complaint:   Chief Complaint   Patient presents with    Hypotension     Pt presents to the ER via EMS after being hypotensive at dialysis. Pt had systolic BP of 60 at dialysis.        History of Present Illness:  Ms. Hutchins is a 72-year-old female who was brought to the ED via EMS from outpatient dialysis center due to hypotension.  Patient with complicated recent history, about 2 months ago, admitted with ischemic colitis status post laparotomy with colostomy, stated she stayed in the hospital for about 1 month, she was discharge but had fall at assisted living facility and re-presented to the hospital following day, since then has had persistent hypotension, antihypertensives were discontinued, she was started on midodrine.  She was subsequently discharged to inpatient rehab and states she had returned to assisted living facility about 2 weeks ago.  Known history of ESRD on HD MWF.  She states blood pressure has been running low since previous admission, yesterday midodrine was increased from 5 mg t.i.d. to 10 mg t.i.d.. States her appetite has been low, over the last 24 hours has noticed decreased output from her colostomy, chronic nausea without any recent change, no significant increased abdominal pain.  States at dialysis today, near end of session, needle got dislodged from fistula, about 300 cc of blood loss, states following this she became lightheaded/dizzy, blood pressure 60/30 and she was referred to the ED.  States with dialysis recently they have not been removing fluid due to hypotension.  States she does have home health at assisted living facility.  In the ED blood pressure was low, 94/56, received fluid bolus, subsequently started on Levophed, during my encounter blood pressure 116/57 on Levophed 0.3 mcg/kg/min.  Patient is currently reporting  back pain.  Labs with WBC 11.36, hemoglobin 8.6, platelet 162, potassium 1.9, magnesium 1.2, albumin 2.2, lactic acid 2.2, BUN/creatinine 5/2.4.  EKG reviewed.  Chest x-ray clear.  Previous echo with EF of 70%.  She received acetaminophen, magnesium, potassium, 472 cc fluid bolus as well as cefepime in the ED. She was started on Levophed.  CTA abdomen and pelvis obtained with diffuse pneumatosis intestinal most prominent ascending, transverse and descending colon with portal vein gas.  She was started on broad-spectrum antibiotics.  General surgery was consulted and underwent emergent exploratory laparotomy with total colectomy, creation of ileostomy, inadvertent bladder injury requiring repair.  She was extubated postprocedure and transferred to the ICU.  Received 1 unit PRBC and FFP.  On 11/17 reports nausea, abdominal pain, still on Levophed. We are consulted for dialysis.    Interval History:  11/17- weaning pressors, stopped IVFs, can remove lee if ok with urology  11/18- still on levophed- reviewed urology note regarding lee- seen on HD- UF even    Plan of Care:    Assessment:  Ischemic colitis s/p laprotomy with total colectomy and ileostomy, 11/17  ESRD on HD MWF  Septic shock  Hyponatremia  Hypokalemia  Lactic acidosis  SHPT  Anemia of CKD    Plan:    - continue septic shock management- continue lee- dose antbx for CrCl < 10/HD  - ordered HD MWF  - wean levophed- continue midodrine  - UF even  - continue TPN  - adjust dialysate  - BMM at goal  - ordered ADAM with HD    Thank you for allowing us to participate in this patient's care. We will continue to follow.    Vital Signs:  Temp Readings from Last 3 Encounters:   11/18/22 98.6 °F (37 °C) (Oral)   10/29/22 98.5 °F (36.9 °C) (Oral)   09/27/22 97.4 °F (36.3 °C)       Pulse Readings from Last 3 Encounters:   11/18/22 91   10/29/22 91   09/27/22 74       BP Readings from Last 3 Encounters:   11/18/22 (!) 143/68   10/29/22 (!) 105/58   09/27/22 101/61        Weight:  Wt Readings from Last 3 Encounters:   11/17/22 47.2 kg (104 lb 0.9 oz)   11/17/22 47.2 kg (104 lb)   10/29/22 50.8 kg (112 lb)       Medications:  Scheduled Meds:   ceFEPime (MAXIPIME) IVPB  1 g Intravenous Q24H    chlorhexidine  15 mL Mouth/Throat BID    epoetin nica-ebpx (RETACRIT) injection  100 Units/kg Intravenous Every Mon, Wed, Fri    levothyroxine  88 mcg Oral Before breakfast    metronidazole  500 mg Intravenous Q8H    midodrine  5 mg Oral TID    mupirocin   Nasal BID     Continuous Infusions:   NORepinephrine bitartrate-D5W 0.35 mcg/kg/min (11/18/22 0813)    TPN ADULT CENTRAL LINE CUSTOM (3 in 1) 50 mL/hr at 11/18/22 0506     PRN Meds:.acetaminophen, calcium gluconate IVPB, calcium gluconate IVPB, calcium gluconate IVPB, dextrose 10%, dextrose 10%, glucagon (human recombinant), glucose, glucose, LIDOcaine HCl 2%, magnesium sulfate IVPB, magnesium sulfate IVPB, morphine, naloxone, ondansetron, potassium chloride in water **AND** potassium chloride in water **AND** potassium chloride in water, prochlorperazine, senna-docusate 8.6-50 mg, sodium chloride 0.9%, sodium phosphate IVPB, sodium phosphate IVPB, sodium phosphate IVPB  Current Facility-Administered Medications on File Prior to Encounter   Medication Dose Route Frequency Provider Last Rate Last Admin    electrolyte-S (ISOLYTE)   Intravenous Continuous Raz Ruff MD        lactated ringers infusion   Intravenous Continuous Ruy Beltre MD   New Bag at 11/16/22 4228     Current Outpatient Medications on File Prior to Encounter   Medication Sig Dispense Refill    cholecalciferol, vitamin D3, (VITAMIN D3) 25 mcg (1,000 unit) capsule Take 1 capsule (1,000 Units total) by mouth once daily. 10 capsule 0    dronabinoL (MARINOL) 2.5 MG capsule Take 2.5 mg by mouth 2 (two) times daily before meals.      ergocalciferol (ERGOCALCIFEROL) 50,000 unit Cap Take 50,000 Units by mouth every 7 days. MONDAYS      ferric citrate (AURYXIA) 210 mg  iron Tab Take 2 tablets by mouth 3 (three) times daily with meals.      gabapentin (NEURONTIN) 100 MG capsule Take 100 mg by mouth 2 (two) times daily.      hydrALAZINE (APRESOLINE) 50 MG tablet Take 1 tablet (50 mg total) by mouth every 8 (eight) hours. 270 tablet 0    hydrOXYzine HCL (ATARAX) 25 MG tablet Take 1 tablet (25 mg total) by mouth 3 (three) times daily as needed for Itching.      levothyroxine (SYNTHROID) 88 MCG tablet TAKE 1 TABLET BEFORE BREAKFAST (Patient taking differently: Take 88 mcg by mouth before breakfast. TAKE 1 TABLET BEFORE BREAKFAST) 90 tablet 3    LORazepam (ATIVAN) 0.5 MG tablet Take 1 tablet (0.5 mg total) by mouth every evening. 90 tablet 1    midodrine (PROAMATINE) 5 MG Tab Take 5 mg by mouth 3 (three) times daily.      mirtazapine (REMERON) 15 MG tablet Take 7.5 mg by mouth every evening.      promethazine (PHENERGAN) 25 MG tablet TAKE 1 TABLET (25 MG TOTAL) BY MOUTH 2 (TWO) TIMES DAILY AS NEEDED FOR NAUSEA. 60 tablet 0    rOPINIRole (REQUIP) 0.5 MG tablet Take 0.5 mg by mouth every 8 (eight) hours.      sertraline (ZOLOFT) 50 MG tablet Take 1 tablet (50 mg total) by mouth every evening. Take 50 mg by mouth every evening. 90 tablet 1    simethicone (MYLICON) 80 MG chewable tablet Take 80 mg by mouth every 6 (six) hours as needed.      traMADoL (ULTRAM) 50 mg tablet Take 1 tablet by mouth 4 (four) times daily as needed.      zinc gluconate 50 mg tablet Take 50 mg by mouth once daily.      cholestyramine (QUESTRAN) 4 gram packet Take 4 g by mouth 2 (two) times daily.      diltiaZEM (CARDIZEM CD) 120 MG Cp24 Take 1 capsule (120 mg total) by mouth once daily. 90 capsule 3    ferrous sulfate (FEOSOL) 325 mg (65 mg iron) Tab tablet 325 mg.      oxyCODONE (OXY-IR) 5 mg Cap Take 5 mg by mouth.      oxyCODONE-acetaminophen (PERCOCET) 5-325 mg per tablet Take 1 tablet by mouth every 6 (six) hours as needed for Pain. 12 tablet 0    sevelamer carbonate (RENVELA) 800 mg Tab 800 mg.      zinc  sulfate (ZINCATE) 50 mg zinc (220 mg) capsule Take 1 capsule by mouth once daily.      [DISCONTINUED] miconazole (MICOTIN) 2 % cream Apply topically 2 (two) times daily. 28 g 0     Review of Systems:  Neg    Physical Exam:  Constitutional: nad, aao x 3  Heart: rrr, no m/r/g, wwp, no edema  Lungs: ctab, no w/r/r/c, no lb  Abdomen: s/nt/nd, +BS      Results:  Lab Results   Component Value Date     (L) 11/18/2022    K 4.0 11/18/2022    CL 95 11/18/2022    CO2 22 (L) 11/18/2022    BUN 16 11/18/2022    CREATININE 4.4 (H) 11/18/2022    CALCIUM 7.6 (L) 11/18/2022    ANIONGAP 8 11/18/2022    ESTGFRAFRICA 12.5 (A) 07/20/2022    EGFRNONAA 10.9 (A) 07/20/2022       Lab Results   Component Value Date    CALCIUM 7.6 (L) 11/18/2022    PHOS 3.5 11/18/2022       Recent Labs   Lab 11/18/22  0400   WBC 11.30   RBC 2.50*   HGB 8.4*   HCT 25.6*   PLT 94*   *   MCH 33.6*   MCHC 32.8         I have personally reviewed pertinent radiological imaging and reports.    I have spent > 35 minutes providing care for this patient for the above diagnoses. These services have included chart/data/imaging review, evaluation, exam, formulation of plan, , note preparation, and discussions with staff involved in this patient's care.    Yasmin Parker MD MPH  [facility]  [address] [place] [address]  [place] [address]  [phone] (p)  [phone] (f)

## 2022-11-18 NOTE — PLAN OF CARE
Problem: Parenteral Nutrition  Goal: Effective Intravenous Nutrition Therapy Delivery  Outcome: Ongoing, Progressing  Intervention: Optimize Intravenous Nutrition Delivery  Flowsheets (Taken 11/18/2022 1027)  Nutrition Support Management: (Parenteral nutrition composition adjusted) other (see comments)

## 2022-11-18 NOTE — PLAN OF CARE
S/P abdominal surgery with ileostomy placement. Vertical surgical dressing intact w/o drainage noted. H&H stable. Remains on low dose levophed. Clinamix started yesterday for nutrition.  Scheduled for HD dialysis today. Godwin cath to remain in place until surgeon and urologist okay to D/C.    Problem: Adult Inpatient Plan of Care  Goal: Plan of Care Review  Outcome: Ongoing, Progressing  Goal: Patient-Specific Goal (Individualized)  Outcome: Ongoing, Progressing  Goal: Absence of Hospital-Acquired Illness or Injury  Outcome: Ongoing, Progressing  Goal: Optimal Comfort and Wellbeing  Outcome: Ongoing, Progressing  Goal: Readiness for Transition of Care  Outcome: Ongoing, Progressing     Problem: Adjustment to Illness (Sepsis/Septic Shock)  Goal: Optimal Coping  Outcome: Ongoing, Progressing     Problem: Bleeding (Sepsis/Septic Shock)  Goal: Absence of Bleeding  Outcome: Ongoing, Progressing     Problem: Glycemic Control Impaired (Sepsis/Septic Shock)  Goal: Blood Glucose Level Within Desired Range  Outcome: Ongoing, Progressing     Problem: Infection Progression (Sepsis/Septic Shock)  Goal: Absence of Infection Signs and Symptoms  Outcome: Ongoing, Progressing     Problem: Nutrition Impaired (Sepsis/Septic Shock)  Goal: Optimal Nutrition Intake  Outcome: Ongoing, Progressing     Problem: Fluid and Electrolyte Imbalance (Acute Kidney Injury/Impairment)  Goal: Fluid and Electrolyte Balance  Outcome: Ongoing, Progressing     Problem: Oral Intake Inadequate (Acute Kidney Injury/Impairment)  Goal: Optimal Nutrition Intake  Outcome: Ongoing, Progressing     Problem: Renal Function Impairment (Acute Kidney Injury/Impairment)  Goal: Effective Renal Function  Outcome: Ongoing, Progressing     Problem: Fluid Imbalance (Pneumonia)  Goal: Fluid Balance  Outcome: Ongoing, Progressing     Problem: Infection (Pneumonia)  Goal: Resolution of Infection Signs and Symptoms  Outcome: Ongoing, Progressing     Problem: Respiratory  Compromise (Pneumonia)  Goal: Effective Oxygenation and Ventilation  Outcome: Ongoing, Progressing     Problem: Infection  Goal: Absence of Infection Signs and Symptoms  Outcome: Ongoing, Progressing     Problem: Impaired Wound Healing  Goal: Optimal Wound Healing  Outcome: Ongoing, Progressing     Problem: Fall Injury Risk  Goal: Absence of Fall and Fall-Related Injury  Outcome: Ongoing, Progressing     Problem: Skin Injury Risk Increased  Goal: Skin Health and Integrity  Outcome: Ongoing, Progressing     Problem: Parenteral Nutrition  Goal: Effective Intravenous Nutrition Therapy Delivery  Outcome: Ongoing, Progressing     Problem: Device-Related Complication Risk (Hemodialysis)  Goal: Safe, Effective Therapy Delivery  Outcome: Ongoing, Progressing     Problem: Hemodynamic Instability (Hemodialysis)  Goal: Effective Tissue Perfusion  Outcome: Ongoing, Progressing     Problem: Infection (Hemodialysis)  Goal: Absence of Infection Signs and Symptoms  Outcome: Ongoing, Progressing

## 2022-11-18 NOTE — PROGRESS NOTES
Pt kept even per Dr. Parker, no issues         11/18/22 1200   Handoff Report   Received From Kaylee Lu   Given To Stanley   Vital Signs   Temp 98.1 °F (36.7 °C)   Temp src Oral   Pulse 81   Resp (!) 31   SpO2 97 %   Art Line   Arterial Line /39   Arterial Line MAP (mmHg) 63 mmHg   Assessments (Pre/Post)   Blood Liters Processed (BLP) 58   Transport Modality not applicable   Level of Consciousness (AVPU) alert   Dialyzer Clearance mildly streaked   Post-Hemodialysis Assessment   Rinseback Volume (mL) 250 mL   Blood Volume Processed (Liters) 58 L   Dialyzer Clearance Lightly streaked   Duration of Treatment 180 minutes   Additional Fluid Intake (mL) 600 mL   Total UF (mL) 600 mL   Net Fluid Removal 0   Patient Response to Treatment Tolerated well with pressor   Arterial bleeding stop time (min) 7 min   Venous bleeding stop time (min) 7 min   Post-Hemodialysis Comments Tx complete, all blood reinfused per protocol, pt stable

## 2022-11-18 NOTE — PROGRESS NOTES
Critical access hospital Medicine  Progress Note    Patient Name: Shara Hutchins  MRN: [id number]  Patient Class: IP- Inpatient   Admission Date: 11/16/2022  Length of Stay: 2 days  Attending Physician: Dana Ovalle MD  Primary Care Provider: April Horton MD        Subjective:     Principal Problem:Status post total colectomy        HPI:  Ms. Hutchins is a 72-year-old female who was brought to the ED via EMS from outpatient dialysis center due to hypotension.  Patient with complicated recent history, about 2 months ago, admitted with ischemic colitis status post laparotomy with colostomy, stated she stayed in the hospital for about 1 month, she was discharge but had fall at assisted living facility and re-presented to the hospital following day, since then has had persistent hypotension, antihypertensives were discontinued, she was started on midodrine.  She was subsequently discharged to inpatient rehab and states she had returned to assisted living facility about 2 weeks ago.  Known history of ESRD on HD MWF.  She states blood pressure has been running low since previous admission, yesterday midodrine was increased from 5 mg t.i.d. to 10 mg t.i.d..  States her appetite has been low, over the last 24 hours has noticed decreased output from her colostomy, chronic nausea without any recent change, no significant increased abdominal pain.  States at dialysis today, near end of session, needle got dislodged from fistula, about 300 cc of blood loss, states following this she became lightheaded/dizzy, blood pressure 60/30 and she was referred to the ED.  States with dialysis recently they have not been removing fluid due to hypotension.  States she does have home health at assisted living facility.  In the ED blood pressure was low, 94/56, received fluid bolus, subsequently started on Levophed, during my encounter blood pressure 116/57 on Levophed 0.3 mcg/kg/min.  Patient is currently reporting back pain.   Labs with WBC 11.36, hemoglobin 8.6, platelet 162, potassium 1.9, magnesium 1.2, albumin 2.2, lactic acid 2.2, BUN/creatinine 5/2.4.  EKG reviewed.  Chest x-ray clear.  Previous echo with EF of 70%.  She received acetaminophen, magnesium, potassium, 472 cc fluid bolus as well as cefepime in the ED.  Case discussed with ED provider who requested admission.  Plan of care was discussed with patient, no visitors at bedside.  Address code status and patient confirms DNR.      Overview/Hospital Course:  Patient presented with worsening hypotension from outpatient dialysis center.  Recent complicated past medical history.  She received IV fluid and was started on Levophed.  CTA abdomen and pelvis obtained with diffuse pneumatosis intestinal most prominent ascending, transverse and descending colon with portal vein gas.  She was started on broad-spectrum antibiotics.  General surgery was consulted and underwent emergent exploratory laparotomy with total colectomy, creation of ileostomy, inadvertent bladder injury requiring repair.  She was extubated postprocedure and transferred to the ICU.  Received 1 unit PRBC and FFP.  On 11/17 reports nausea, abdominal pain, still on Levophed.  11/18 still with significant abdominal pain, no output from ileostomy, receiving dialysis today, running even, still on Levophed, resuming home midodrine and attempting to wean off.      Interval History:  Patient continues to report severe generalized abdominal pain associated with nausea, no emesis this morning, no output from ileostomy.  At times intermittently confused, possibly related to IV pain medication.  Still on Levophed, currently undergoing hemodialysis and plans to run even today.  Denies any significant shortness of breath.  On nasal cannula, T-max 99.5°.  Labs with WBC 11.30, hemoglobin 8.4, platelet 94, sodium 125, potassium 4, magnesium 1.7, albumin 1.9.  Echo with EF of 75%.  Culture negative.  Discussed with patient.   Discussed with nursing.    Review of Systems   Constitutional:  Negative for fever.   Respiratory:  Negative for shortness of breath.    Gastrointestinal:  Positive for abdominal pain and nausea.   Neurological:  Positive for weakness.   Psychiatric/Behavioral:  Positive for confusion (intermittent).    Objective:     Vital Signs (Most Recent):  Temp: 98.6 °F (37 °C) (11/18/22 0800)  Pulse: 79 (11/18/22 0800)  Resp: (!) 22 (11/18/22 0807)  BP: 136/64 (11/18/22 0800)  SpO2: 97 % (11/18/22 0800)   Vital Signs (24h Range):  Temp:  [98.2 °F (36.8 °C)-99.5 °F (37.5 °C)] 98.6 °F (37 °C)  Pulse:  [79-94] 79  Resp:  [10-43] 22  SpO2:  [91 %-100 %] 97 %  BP: ()/(39-93) 136/64  Arterial Line BP: (109-170)/(39-51) 109/39     Weight: 47.2 kg (104 lb 0.9 oz)  Body mass index is 16.3 kg/m².    Intake/Output Summary (Last 24 hours) at 11/18/2022 0854  Last data filed at 11/18/2022 0506  Gross per 24 hour   Intake 1539.46 ml   Output --   Net 1539.46 ml      Physical Exam  Vitals and nursing note reviewed.   Constitutional:       Comments: Elderly frail female lying in bed, ill appearing, cooperative   HENT:      Head: Normocephalic and atraumatic.      Mouth/Throat:      Mouth: Mucous membranes are moist.      Comments: Chapped lips  Eyes:      General:         Right eye: No discharge.         Left eye: No discharge.   Neck:      Comments: LIJ in place  Cardiovascular:      Rate and Rhythm: Normal rate and regular rhythm.      Comments: Wearing SCDs  Pulmonary:      Comments: On NC, decreased at bases, no wheeze, no accessory muscle use  Abdominal:      Tenderness: There is abdominal tenderness.      Comments: Scaphoid, ileostomy RLQ, surgical dressing in place, no BS    Genitourinary:     Comments: Godwin in place with minimal bloody urine  Skin:     Comments: Right radial arterial line, LUE AVF - connected.  Bruising upper extremities   Neurological:      Mental Status: She is alert and oriented to person, place, and time.       Comments: +generalized weakness   Psychiatric:         Mood and Affect: Mood normal.       Significant Labs: BMP:   Recent Labs   Lab 11/18/22  0400   *   *   K 4.0   CL 95   CO2 22*   BUN 16   CREATININE 4.4*   CALCIUM 7.6*   MG 1.7     CBC:   Recent Labs   Lab 11/17/22  0145 11/17/22  0839 11/18/22  0400   WBC 9.35 13.12* 11.30   HGB 8.0* 9.9* 8.4*   HCT 25.6* 31.1* 25.6*    151 94*     CMP:   Recent Labs   Lab 11/17/22  0145 11/17/22  0839 11/18/22  0400   * 126* 125*   K 3.4* 3.9 4.0   CL 99 95 95   CO2 25 23 22*   * 137* 146*   BUN 9 10 16   CREATININE 3.4* 3.3* 4.4*   CALCIUM 7.0* 7.3* 7.6*   PROT 4.3* 4.9* 4.5*   ALBUMIN 2.1* 2.3* 1.9*   BILITOT 1.4* 3.7* 4.9*   ALKPHOS 126 128 115   AST 73* 39 36   ALT 23 22 23   ANIONGAP 8 8 8     Cardiac Markers:   Recent Labs   Lab 11/16/22  1318   *     Lactic Acid:   Recent Labs   Lab 11/16/22 2124 11/17/22  0145 11/17/22  0839   LACTATE 2.2* 3.3* 2.2*     Magnesium:   Recent Labs   Lab 11/17/22 0145 11/17/22  0839 11/18/22  0400   MG 1.9 1.8 1.7     TSH:   Recent Labs   Lab 11/16/22  1318   TSH 2.820     Urine Culture:   Recent Labs   Lab 11/16/22 2111   LABURIN No growth to date     Urine Studies:   Recent Labs   Lab 11/16/22 2111   COLORU Yellow   APPEARANCEUA Cloudy*   PHUR 8.0   SPECGRAV 1.010   PROTEINUA 3+*   GLUCUA Negative   KETONESU Negative   BILIRUBINUA Negative   OCCULTUA 2+*   NITRITE Negative   UROBILINOGEN Negative   LEUKOCYTESUR 3+*   RBCUA 1   WBCUA >100*   BACTERIA Negative   SQUAMEPITHEL 0   HYALINECASTS 2*       Significant Imaging: I have reviewed all pertinent imaging results/findings within the past 24 hours.    Echocardiogram  Summary     The left ventricle is normal in size with mild concentric hypertrophy and hyperdynamic systolic function.   The estimated ejection fraction is 75%.   Normal right ventricular size with normal right ventricular systolic function.   Mild mitral  regurgitation.   Normal central venous pressure (3 mmHg).      Assessment/Plan:      Active Hospital Problems    Diagnosis    *Status post total colectomy    Ischemic colitis s/p laprotomy with total colectomy and ileostomy, 11/17    Ischemic colon    Hyponatremia    Bladder injury    Hypotension, acute on chronic    QT prolongation    Thrombocytopenia    S/P ureteral stent placement    ESRD on dialysis MWF    Bilateral LE neuropathy    Severe malnutrition    Hypothyroidism    Anemia of chronic disease    Hisotry of Vulvar cancer        Plan:  Continue care in ICU with continuous cardiac telemetry monitoring  Postop care as per General surgery  NPO except for medications/ice chips  Continue empiric antibiotics with cefepime and Flagyl, cultures negative   Continue TPN for nutrition  Continue Levophed, titrate for map greater than 65, resume home midodrine 5 mg t.i.d.  Repeat echocardiogram with EF of 75%   Adjusted p.r.n. analgesic, continue antiemetic, adjust as needed  Bladder injury intraoperatively, keep Godwin catheter in place for at least 2 weeks  Renally dosing all medications and avoid nephrotoxin drugs  Fall and delirium precautions  A.m. labs ordered  Appreciate all consultant's input   Increase activity, PT/OT once off Levophed infusion   Seen by Urology, , keep Godwin catheter in place at least 2 weeks, cystogram prior to removal, will need stent removed in few months, follow-up with Dr. Perez  Further plan as per clinical course     VTE Risk Mitigation (From admission, onward)         Ordered     IP VTE HIGH RISK PATIENT  Once         11/16/22 1845     Place sequential compression device  Until discontinued         11/16/22 1845                Discharge Planning   RODRIGO:      Code Status: DNR   Is the patient medically ready for discharge?:     Reason for patient still in hospital (select all that apply): Patient unstable                     Dana Ovalle MD  Department of  [facility]   [facility]

## 2022-11-18 NOTE — SUBJECTIVE & OBJECTIVE
Past Medical History:   Diagnosis Date    Cellulitis of trunk     Perineum, numerous episodes    Chronic diarrhea 02/10/2020    Short-gut syndrome    End stage renal disease on dialysis 01/07/2018    Fatigue     Hypertension     Hypothyroidism 02/10/2020    Iron deficiency anemia due to chronic blood loss 01/07/2018    Pulmonary hypertension 03/20/2020    Vulvar cancer 01/07/2019       Past Surgical History:   Procedure Laterality Date    ABDOMINAL SURGERY      ANGIOGRAPHY OF ARTERIOVENOUS SHUNT N/A 01/19/2022    Procedure: Fistulogram with Possible Intervention;  Surgeon: Raz Maher MD;  Location: [facility] CATH/EP LAB;  Service: Cardiothoracic;  Laterality: N/A;    APPENDECTOMY      AV FISTULA PLACEMENT Left 2018    BACK SURGERY      BLADDER FULGURATION N/A 06/16/2020    Procedure: FULGURATION, BLADDER;  Surgeon: Damari Barber Jr., MD;  Location: [facility] OR;  Service: Urology;  Laterality: N/A;    BLADDER REPAIR  11/16/2022    Procedure: REPAIR, BLADDER;  Surgeon: Kannan Broderick MD;  Location: [facility] OR;  Service: General;;    carpel tunnel surgery once on each hand      CERVICAL FUSION      x 4    cervix repair      CHOLECYSTECTOMY  2000    COLECTOMY      COLECTOMY, TOTAL  11/16/2022    Procedure: COLECTOMY, TOTAL;  Surgeon: Kannan Broderick MD;  Location: [facility];  Service: General;;    CYSTOSCOPY W/ RETROGRADES  06/16/2020    Procedure: CYSTOSCOPY, WITH RETROGRADE PYELOGRAM;  Surgeon: Damari Barber Jr., MD;  Location: [facility] OR;  Service: Urology;;    CYSTOSCOPY W/ RETROGRADES Right 04/20/2021    Procedure: CYSTOSCOPY, WITH RETROGRADE PYELOGRAM;  Surgeon: Damari Barber Jr., MD;  Location: [facility] OR;  Service: Urology;  Laterality: Right;    CYSTOSCOPY W/ URETERAL STENT PLACEMENT Right 03/16/2021    Procedure: CYSTOSCOPY, WITH URETERAL STENT INSERTION;  Surgeon: Damari Barber Jr., MD;  Location: [facility];  Service: Urology;  Laterality: Right;    CYSTOSCOPY W/ URETERAL STENT PLACEMENT Right 05/18/2021    Procedure:  CYSTOSCOPY, WITH URETERAL STENT INSERTION;  Surgeon: Damari Barber Jr., MD;  Location: [facility] OR;  Service: Urology;  Laterality: Right;    CYSTOSCOPY W/ URETERAL STENT PLACEMENT Right 06/22/2022    Procedure: CYSTOSCOPY, WITH URETERAL STENT INSERTION;  Surgeon: Gaby Garcia MD;  Location: [facility] OR;  Service: Urology;  Laterality: Right;  cysto stent exchange    CYSTOSCOPY W/ URETERAL STENT REMOVAL Right 04/20/2021    Procedure: CYSTOSCOPY, WITH URETERAL STENT REMOVAL;  Surgeon: Damari Barber Jr., MD;  Location: [facility] OR;  Service: Urology;  Laterality: Right;    CYSTOSCOPY WITH URETEROSCOPY, RETROGRADE PYELOGRAPHY, AND INSERTION OF STENT Right 03/30/2021    Procedure: CYSTOSCOPY, WITH RETROGRADE PYELOGRAM AND URETERAL STENT INSERTION;  Surgeon: Damari Barber Jr., MD;  Location: [facility] OR;  Service: Urology;  Laterality: Right;    CYSTOURETEROSCOPY WITH RETROGRADE PYELOGRAPHY AND INSERTION OF STENT INTO URETER Right 01/18/2022    Procedure: CYSTOURETEROSCOPY, WITH RETROGRADE PYELOGRAM AND URETERAL STENT INSERTION;  Surgeon: Damari Barber Jr., MD;  Location: [facility];  Service: Urology;  Laterality: Right;    CYSTOURETEROSCOPY WITH RETROGRADE PYELOGRAPHY AND INSERTION OF STENT INTO URETER Right 8/30/2022    Procedure: CYSTOURETEROSCOPY, WITH RETROGRADE PYELOGRAM AND URETERAL STENT INSERTION;  Surgeon: Damari Barber Jr., MD;  Location: [facility];  Service: Urology;  Laterality: Right;    DIAGNOSTIC LAPAROSCOPY N/A 02/13/2020    Procedure: LAPAROSCOPY, DIAGNOSTIC;  Surgeon: Robbi Lovell III, MD;  Location: [facility] OR;  Service: General;  Laterality: N/A;    HYSTERECTOMY  1980    ILEOSTOMY N/A 02/13/2020    Procedure: CREATION, ILEOSTOMY Loop;  Surgeon: Robbi Lovell III, MD;  Location: [facility] OR;  Service: General;  Laterality: N/A;    ILEOSTOMY CLOSURE N/A 05/18/2020    Procedure: CLOSURE, ILEOSTOMY;  Surgeon: Robbi Lovell III, MD;  Location: [facility] OR;  Service: General;  Laterality: N/A;    LASER  LITHOTRIPSY Right 03/30/2021    Procedure: LITHOTRIPSY, USING LASER;  Surgeon: Damari Barber Jr., MD;  Location: [facility] OR;  Service: Urology;  Laterality: Right;    LUMBAR LAMINECTOMY      LYSIS OF ADHESIONS N/A 02/13/2020    Procedure: LYSIS, ADHESIONS;  Surgeon: Robbi Lovell III, MD;  Location: [facility] OR;  Service: General;  Laterality: N/A;    NECK SURGERY      OSTEOTOMY  9/7/2022    Procedure: OSTEOTOMY;  Surgeon: Brigette Chavez MD;  Location: [facility] OR;  Service: General;;    PERCUTANEOUS TRANSLUMINAL ANGIOPLASTY OF ARTERIOVENOUS FISTULA N/A 01/19/2022    Procedure: PTA, AV FISTULA;  Surgeon: Raz Maher MD;  Location: [facility] CATH/EP LAB;  Service: Cardiothoracic;  Laterality: N/A;    PHLEBOGRAPHY  02/28/2020    Procedure: VENOGRAM;  Surgeon: Robbi Lovell III, MD;  Location: [facility] OR;  Service: General;;    REMOVAL OF VASCULAR ACCESS PORT Right 02/13/2020    Procedure: REMOVAL, VASCULAR ACCESS PORT;  Surgeon: Robbi Lovell III, MD;  Location: [facility] OR;  Service: General;  Laterality: Right;    RETROGRADE PYELOGRAPHY  03/16/2021    Procedure: PYELOGRAM, RETROGRADE;  Surgeon: Damari Barber Jr., MD;  Location: [facility] OR;  Service: Urology;;    RETROGRADE PYELOGRAPHY Right 05/18/2021    Procedure: PYELOGRAM, RETROGRADE;  Surgeon: Damari Barber Jr., MD;  Location: [facility] OR;  Service: Urology;  Laterality: Right;    RETROGRADE PYELOGRAPHY  06/22/2022    Procedure: PYELOGRAM, RETROGRADE;  Surgeon: Gaby Garcia MD;  Location: [facility] OR;  Service: Urology;;    SHOULDER SURGERY      TONSILLECTOMY      URETEROSCOPIC REMOVAL OF URETERIC CALCULUS Right 03/30/2021    Procedure: REMOVAL, CALCULUS, URETER, URETEROSCOPIC;  Surgeon: Damari Barber Jr., MD;  Location: [facility] OR;  Service: Urology;  Laterality: Right;    URETEROSCOPIC REMOVAL OF URETERIC CALCULUS Right 04/20/2021    Procedure: REMOVAL, CALCULUS, URETER, URETEROSCOPIC;  Surgeon: Damari Barber Jr., MD;  Location: [facility] OR;  Service: Urology;   Laterality: Right;    URETEROSCOPIC REMOVAL OF URETERIC CALCULUS Right 2022    Procedure: REMOVAL, CALCULUS, URETER, URETEROSCOPIC;  Surgeon: Damari Barber Jr., MD;  Location: [facility];  Service: Urology;  Laterality: Right;    vulva cancer         Review of patient's allergies indicates:   Allergen Reactions    Ciprofloxacin Other (See Comments)     Elevated liver enzymes      Pcn [penicillins] Anaphylaxis     TOLERATES ROCEPHIN WITHOUT DIFFICULTY       Family History       Problem Relation (Age of Onset)    Cancer Mother    Heart disease Mother, Father, Sister    Hypertension Mother    No Known Problems Daughter            Tobacco Use    Smoking status: Former     Packs/day: 1.00     Years: 33.00     Pack years: 33.00     Types: Cigarettes     Quit date: 2000     Years since quittin.8    Smokeless tobacco: Former     Quit date:    Substance and Sexual Activity    Alcohol use: No    Drug use: No    Sexual activity: Not on file       Review of Systems   HENT:  Negative for trouble swallowing.    Respiratory:  Negative for cough.    Gastrointestinal:  Positive for abdominal pain.   Genitourinary:  Positive for hematuria. Negative for difficulty urinating, dysuria and flank pain.     Objective:     Temp:  [97.8 °F (36.6 °C)-99.5 °F (37.5 °C)] 99.5 °F (37.5 °C)  Pulse:  [70-94] 88  Resp:  [10-43] 25  SpO2:  [89 %-100 %] 91 %  BP: ()/(39-93) 126/51  Arterial Line BP: ()/(34-54) 111/42     Body mass index is 16.3 kg/m².    Date 22 07 - 22 0659   Shift 1171-8849 2605-2061 3424-8961 24 Hour Total   INTAKE   P.O.  0  0   I.V.(mL/kg)  550(11.7)  550(11.7)   IV Piggyback  50  50   Shift Total(mL/kg)  600(12.7)  600(12.7)   OUTPUT   Shift Total(mL/kg)       Weight (kg) 47.2 47.2 47.2 47.2          Drains       Drain  Duration                  Hemodialysis AV Fistula Left upper arm -- days         Ureteral Drain/Stent 22 Right ureter 7 Fr. 79 days         Closed/Suction Drain  09/07/22 1718 Right;Anterior RUQ Bulb 19 Fr. 71 days         Colostomy 09/07/22 1750 Descending/sigmoid LLQ 71 days                    Physical Exam  Vitals reviewed.   Constitutional:       General: She is not in acute distress.     Appearance: Normal appearance. She is ill-appearing. She is not toxic-appearing or diaphoretic.   HENT:      Head: Normocephalic and atraumatic.   Eyes:      General: No scleral icterus.  Cardiovascular:      Rate and Rhythm: Normal rate and regular rhythm.   Pulmonary:      Effort: Pulmonary effort is normal. No respiratory distress.   Abdominal:      General: There is distension.   Genitourinary:     Comments: Godwin with minimal bloody urine   Psychiatric:         Mood and Affect: Mood normal.         Behavior: Behavior normal.       Significant Labs:    BMP:  Recent Labs   Lab 11/16/22 2110 11/17/22 0145 11/17/22  0839   * 132* 126*   K 2.6* 3.4* 3.9   CL 95 99 95   CO2 30* 25 23   BUN 9 9 10   CREATININE 3.4* 3.4* 3.3*   CALCIUM 7.3* 7.0* 7.3*       CBC:  Recent Labs   Lab 11/16/22 2056 11/16/22 2216 11/17/22  0024 11/17/22  0145 11/17/22  0839   WBC 16.66*  --   --  9.35 13.12*   HGB 8.8*  --   --  8.0* 9.9*   HCT 28.1*   < > 24* 25.6* 31.1*     --   --  189 151    < > = values in this interval not displayed.       Urine Culture:   Recent Labs   Lab 11/16/22 2111   LABURIN No growth to date     Urine Studies:   Recent Labs   Lab 11/16/22 2111   COLORU Yellow   APPEARANCEUA Cloudy*   PHUR 8.0   SPECGRAV 1.010   PROTEINUA 3+*   GLUCUA Negative   KETONESU Negative   BILIRUBINUA Negative   OCCULTUA 2+*   NITRITE Negative   UROBILINOGEN Negative   LEUKOCYTESUR 3+*   RBCUA 1   WBCUA >100*   BACTERIA Negative   SQUAMEPITHEL 0   HYALINECASTS 2*     All pertinent labs results from the past 24 hours have been reviewed.    Significant Imaging:  All pertinent imaging results/findings from the past 24 hours have been reviewed.

## 2022-11-19 PROBLEM — R53.1 GENERALIZED WEAKNESS: Chronic | Status: ACTIVE | Noted: 2022-01-01

## 2022-11-19 PROBLEM — R31.9 HEMATURIA: Status: ACTIVE | Noted: 2022-01-01

## 2022-11-19 PROBLEM — E83.39 HYPOPHOSPHATEMIA: Status: ACTIVE | Noted: 2022-01-01

## 2022-11-19 PROBLEM — G93.41 ENCEPHALOPATHY, METABOLIC: Status: ACTIVE | Noted: 2022-01-01

## 2022-11-19 NOTE — PT/OT/SLP PROGRESS
Physical Therapy      Patient Name:  Shara Hutchins   MRN:  [id number]    Patient not seen today secondary to Blood transfusion. Will follow-up 11/20/22.

## 2022-11-19 NOTE — PROGRESS NOTES
[facility]  General Surgery  Progress Note    Subjective:     Interval History:  Patient little more lethargic today but awake and alert.  No nausea.  Afebrile.  Still on vasopressors but weaning toward lower dose.    Post-Op Info:  Procedure(s) (LRB):  LAPAROTOMY, EXPLORATORY (N/A)  CLOSURE, COLOSTOMY  COLECTOMY, TOTAL  REPAIR, BLADDER   3 Days Post-Op      Medications:  Continuous Infusions:   NORepinephrine bitartrate-D5W 0.1 mcg/kg/min (11/19/22 1627)    TPN ADULT CENTRAL LINE CUSTOM (3 in 1) 50 mL/hr at 11/18/22 1747    TPN ADULT CENTRAL LINE CUSTOM (3 in 1)       Scheduled Meds:   chlorhexidine  15 mL Mouth/Throat BID    epoetin nica-ebpx (RETACRIT) injection  100 Units/kg Intravenous Every Mon, Wed, Fri    levothyroxine  88 mcg Oral Before breakfast    midodrine  5 mg Oral TID    mupirocin   Nasal BID     PRN Meds:sodium chloride, acetaminophen, calcium gluconate IVPB, calcium gluconate IVPB, calcium gluconate IVPB, dextrose 10%, dextrose 10%, glucagon (human recombinant), glucose, glucose, HYDROcodone-acetaminophen, HYDROmorphone, LIDOcaine HCl 2%, magnesium sulfate IVPB, magnesium sulfate IVPB, naloxone, ondansetron, potassium chloride in water **AND** potassium chloride in water **AND** potassium chloride in water, prochlorperazine, senna-docusate 8.6-50 mg, sodium chloride 0.9%, sodium phosphate IVPB, sodium phosphate IVPB, sodium phosphate IVPB     Objective:     Vital Signs (Most Recent):  Temp: 98.1 °F (36.7 °C) (11/19/22 1419)  Pulse: 73 (11/19/22 1515)  Resp: (!) 21 (11/19/22 1515)  BP: (!) 177/77 (11/19/22 1500)  SpO2: (!) 93 % (11/19/22 1515)   Vital Signs (24h Range):  Temp:  [96.9 °F (36.1 °C)-98.4 °F (36.9 °C)] 98.1 °F (36.7 °C)  Pulse:  [66-86] 73  Resp:  [7-52] 21  SpO2:  [88 %-100 %] 93 %  BP: (100-178)/(40-77) 177/77  Arterial Line BP: (114-163)/(35-49) 143/41       Intake/Output Summary (Last 24 hours) at 11/19/2022 1707  Last data filed at 11/19/2022 1705  Gross per 24 hour    Intake 1029.44 ml   Output 360 ml   Net 669.44 ml       Physical Exam  Constitutional:       General: She is not in acute distress.  Abdominal:      Tenderness: There is generalized abdominal tenderness.      Comments: Abdomen nondistended.  Diffusely tender.  Ostomy viable.  No stool or flatus and bag   Neurological:      Mental Status: She is lethargic.       Significant Labs:  CBC:   Recent Labs   Lab 11/19/22  0408   WBC 9.75   RBC 2.27*   HGB 7.6*   HCT 23.6*   PLT 74*   *   MCH 33.5*   MCHC 32.2     CMP:   Recent Labs   Lab 11/19/22  0408   *   CALCIUM 7.8*   ALBUMIN 1.6*   PROT 4.0*   *   K 3.7   CO2 24      BUN 13   CREATININE 2.8*   ALKPHOS 186*   ALT 33   AST 47*   BILITOT 4.4*       Significant Diagnostics:  I have reviewed all pertinent imaging results/findings within the past 24 hours.    Assessment/Plan:     Active Diagnoses:    Diagnosis Date Noted POA    PRINCIPAL PROBLEM:  Status post total colectomy [Z90.49] 11/17/2022 Not Applicable    Generalized weakness [R53.1] 11/19/2022 Yes     Chronic    Encephalopathy, metabolic, fluctuating [G93.41] 11/19/2022 Clinically Undetermined    Hematuria [R31.9] 11/19/2022 No    Hypophosphatemia [E83.39] 11/19/2022 No    Hyponatremia [E87.1] 11/17/2022 No    Bladder injury [S37.20XA] 11/17/2022 No    Hypotension, acute on chronic [I95.9] 11/16/2022 Yes    QT prolongation [R94.31] 11/16/2022 Yes    Ischemic colon [K55.9] 11/16/2022 Yes    Ischemic colitis s/p laprotomy with total colectomy and ileostomy, 11/17 [K55.9] 09/07/2022 Yes    Thrombocytopenia [D69.6] 01/17/2022 Yes    S/P ureteral stent placement [Z96.0]  Not Applicable    ESRD on dialysis MWF [N18.6, Z99.2] 03/15/2021 Not Applicable     Chronic    Bilateral LE neuropathy [G62.9] 03/15/2021 Yes     Chronic    Severe malnutrition [E43] 02/24/2020 Yes    Hypothyroidism [E03.9] 02/10/2020 Yes     Chronic    Anemia of chronic disease [D63.8] 02/10/2020 Yes     Chronic    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  -continue antibiotics  -PT  -IS  -ostomy teaching when ready      Robbi Lovell III, MD  General Surgery  [facility]

## 2022-11-19 NOTE — NURSING
Notified Dr. Ovalle of pink/red urine in catheter - changed from yesterday(dark brown). Also, patient is arousable but not as interactive as yesterday. Orders noted.

## 2022-11-19 NOTE — PROGRESS NOTES
[facility]  Adult Nutrition   Progress Note (Nutrition Support Management)    SUMMARY     Recommendations  Recommendation/Intervention: 1. New TPN ordered to be initiated @ 1700. Run @ 50 mL/hr. 2. Blood glucose and electrolytes to be monitored and corrected accordingly. 3. RD to monitor TPN infusion and tolerance. Monitor I/Os. Monitor and mange nutrition support daily.4. Clear liquids to be provided by nursing as tolerated, diet to advance as medically able. 5. RD added ONS, Ensure Clear TID (to provide 720 kcal/day and 24 g/day protein)  Goals: 1. Pt to meet estimated needs via TPN provision + PO intake. 2. Patient to tolerate diet.. 3. BG and electrolytes to trend towards normal limits. 4. Transitional feeding from TPN to PO once oral intake is estimated to meet at least 60% of needs.  Nutrition Goal Status: new, progressing towards goal  Communication of RD Recs: reviewed with RN (reviewed with physician)    Dietitian Rounds Brief  TPN to continue. Plans for patient to trial clear liquid today per nursing and per Dr. Lovell. Nursing to provide clears as tolerated. RD ordered Ensure Clear ONS for patient to try.     PARENTERAL NUTRITION PROGRESS NOTE:    Parenteral Nutrition Day # 3  Diagnosis/Indication for PN: S/p total colectomy, malnutrition  IV Access: Percutaneous Central Line  Diet/Tube Feeding: trial clear liquids, ensure clear TID (ordered today)      Admixture Type: 3-in-1  Infusion Rate and Frequency: 50 mL/hr  Patient Acuity: ICU     PN Composition:   66 grams Amino Acid, 196 grams Dextrose, and 50 grams Lipid.    Today's TPN provides 1430 kcal.  *Dextrose is started at less than full dextrose goal to reduce risk for Refeeding Syndrome. Dextrose content will be advanced as appropriate over the next few days.     Please see order for electrolytes and additives content and adjustments.   *The Nutrition Support Team will continue to monitor electrolytes daily and adjust parenteral  "nutrition as warranted.    Reason for Assessment  Reason For Assessment: RD follow-up, new TPN  Diagnosis: other (see comments) (Status post total colectomy)  Relevant Medical History: Vulvar cancer, hypotension, hypothyroidism, anemia of chronic disease, severe malnutrition, ESRD on dialysis MWF    Nutrition Risk Screen  Nutrition Risk Screen: tube feeding or parenteral nutrition     MST Score: 0  Have you recently lost weight without trying?: No  Weight loss score: 0  Have you been eating poorly because of a decreased appetite?: No  Appetite score: 0       Nutrition/Diet History  Food Allergies: NKFA  Factors Affecting Nutritional Intake: altered gastrointestinal function    Anthropometrics  Temp: 98.4 °F (36.9 °C)  Height Method: Stated  Height: 5' 7" (170.2 cm)  Height (inches): 67 in  Weight Method: Bed Scale  Weight: 47.2 kg (104 lb 0.9 oz)  Weight (lb): 104.06 lb  Ideal Body Weight (IBW), Female: 135 lb  % Ideal Body Weight, Female (lb): 77.08 %  BMI (Calculated): 16.3  BMI Grade: 16 - 16.9 protein-energy malnutrition grade II       Weight History:  Wt Readings from Last 10 Encounters:   11/17/22 47.2 kg (104 lb 0.9 oz)   11/17/22 47.2 kg (104 lb)   10/29/22 50.8 kg (112 lb)   09/15/22 70.8 kg (156 lb)   09/08/22 70.9 kg (156 lb 3.2 oz)   08/30/22 69.4 kg (153 lb)   08/27/22 65.8 kg (145 lb)   08/22/22 67.6 kg (149 lb)   07/20/22 64.4 kg (142 lb)   07/08/22 67.4 kg (148 lb 11.2 oz)       Lab/Procedures/Meds: Pertinent Labs Reviewed    Clinical Chemistry:  Recent Labs   Lab 11/17/22  0839 11/18/22  0400 11/19/22  0408   * 125* 131*   K 3.9 4.0 3.7   CL 95 95 103   CO2 23 22* 24   * 146* 157*   BUN 10 16 13   CREATININE 3.3* 4.4* 2.8*   CALCIUM 7.3* 7.6* 7.8*   PROT 4.9* 4.5* 4.0*   ALBUMIN 2.3* 1.9* 1.6*   BILITOT 3.7* 4.9* 4.4*   ALKPHOS 128 115 186*   AST 39 36 47*   ALT 22 23 33   ANIONGAP 8 8 4*   MG 1.8 1.7 2.2   PHOS 3.4 3.5 2.2*       CBC:   Recent Labs   Lab 11/19/22  0408   WBC 9.75   RBC " 2.27*   HGB 7.6*   HCT 23.6*   PLT 74*   *   MCH 33.5*   MCHC 32.2       Lipid Panel:  Recent Labs   Lab 11/18/22  0400   TRIG 96       Cardiac Profile:  Recent Labs   Lab 11/16/22  1318   *         Thyroid & Parathyroid:  Recent Labs   Lab 11/16/22  1318   TSH 2.820         Medications: Pertinent Medications reviewed    Scheduled Meds:   chlorhexidine  15 mL Mouth/Throat BID    epoetin nica-ebpx (RETACRIT) injection  100 Units/kg Intravenous Every Mon, Wed, Fri    levothyroxine  88 mcg Oral Before breakfast    midodrine  5 mg Oral TID    mupirocin   Nasal BID       Continuous Infusions:   NORepinephrine bitartrate-D5W 0.2 mcg/kg/min (11/19/22 1123)    TPN ADULT CENTRAL LINE CUSTOM (3 in 1) 50 mL/hr at 11/18/22 1747    TPN ADULT CENTRAL LINE CUSTOM (3 in 1)         PRN Meds:.sodium chloride, acetaminophen, calcium gluconate IVPB, calcium gluconate IVPB, calcium gluconate IVPB, dextrose 10%, dextrose 10%, glucagon (human recombinant), glucose, glucose, HYDROcodone-acetaminophen, HYDROmorphone, LIDOcaine HCl 2%, magnesium sulfate IVPB, magnesium sulfate IVPB, naloxone, ondansetron, potassium chloride in water **AND** potassium chloride in water **AND** potassium chloride in water, prochlorperazine, senna-docusate 8.6-50 mg, sodium chloride 0.9%, sodium phosphate IVPB, sodium phosphate IVPB, sodium phosphate IVPB    Estimated/Assessed Needs  Weight Used For Calorie Calculations: 47.2 kg (104 lb 0.9 oz)  Energy Calorie Requirements (kcal): 4314-7911 (35-40)  Energy Need Method: Kcal/kg  Protein Requirements: 57-71 (1.2-1.5 gm/kg)  Weight Used For Protein Calculations: 47.2 kg (104 lb 0.9 oz)  Fluid Requirements (mL): 24 hour UOP + 1000 mL     RDA Method (mL): 1652       Nutrition Prescription Ordered  Current Diet Order: clear liquid - provided by nursing  Current Nutrition Support Formula Ordered: Other (Comment) (custom 3-in-1 TPN)  Current Nutrition Support Rate Ordered: 50 (ml)  Current Nutrition  Support Frequency Ordered: ml/hr    Evaluation of Received Nutrient/Fluid Intake  Parenteral Calories (kcal): 1423  Parenteral Protein (gm): 65  Parenteral Fluid (mL): 1200  Energy Calories Required: not meeting needs  Protein Required: meeting needs  Fluid Required: meeting needs  Tolerance: tolerating     Intake/Output Summary (Last 24 hours) at 11/19/2022 1149  Last data filed at 11/19/2022 0745  Gross per 24 hour   Intake 1504.44 ml   Output 855 ml   Net 649.44 ml      % Intake of Estimated Energy Needs: 75 - 100 % from TPN   % Meal Intake: NPO    Nutrition Risk  Level of Risk/Frequency of Follow-up: high     Monitor and Evaluation  Food and Nutrient Intake: energy intake, food and beverage intake, parenteral nutrition intake  Food and Nutrient Adminstration: diet order, enteral and parenteral nutrition administration  Knowledge/Beliefs/Attitudes: food and nutrition knowledge/skill  Physical Activity and Function: nutrition-related ADLs and IADLs, factors affecting access to physical activity  Anthropometric Measurements: weight, weight change, body mass index  Biochemical Data, Medical Tests and Procedures: inflammatory profile, electrolyte and renal panel, gastrointestinal profile, lipid profile, glucose/endocrine profile  Nutrition-Focused Physical Findings: overall appearance     Nutrition Follow-Up  RD Follow-up?: Yes    Sandra Mayes RD 11/19/2022 11:52 AM

## 2022-11-19 NOTE — PROGRESS NOTES
[facility]  Progress Note    Patient Name: Shara Hutchins  MRN: [id number]  Patient Class: IP- Inpatient   Admission Date: 11/16/2022  Length of Stay: 3 days  Attending Physician: Dana Ovalle MD  Primary Care Provider: April Horton MD        Subjective:     Principal Problem:Status post total colectomy        HPI:  Ms. Hutchins is a 72-year-old female who was brought to the ED via EMS from outpatient dialysis center due to hypotension.  Patient with complicated recent history, about 2 months ago, admitted with ischemic colitis status post laparotomy with colostomy, stated she stayed in the hospital for about 1 month, she was discharge but had fall at assisted living facility and re-presented to the hospital following day, since then has had persistent hypotension, antihypertensives were discontinued, she was started on midodrine.  She was subsequently discharged to inpatient rehab and states she had returned to assisted living facility about 2 weeks ago.  Known history of ESRD on HD MWF.  She states blood pressure has been running low since previous admission, yesterday midodrine was increased from 5 mg t.i.d. to 10 mg t.i.d..  States her appetite has been low, over the last 24 hours has noticed decreased output from her colostomy, chronic nausea without any recent change, no significant increased abdominal pain.  States at dialysis today, near end of session, needle got dislodged from fistula, about 300 cc of blood loss, states following this she became lightheaded/dizzy, blood pressure 60/30 and she was referred to the ED.  States with dialysis recently they have not been removing fluid due to hypotension.  States she does have home health at assisted living facility.  In the ED blood pressure was low, 94/56, received fluid bolus, subsequently started on Levophed, during my encounter blood pressure 116/57 on Levophed 0.3 mcg/kg/min.  Patient is currently reporting back pain.   Labs with WBC 11.36, hemoglobin 8.6, platelet 162, potassium 1.9, magnesium 1.2, albumin 2.2, lactic acid 2.2, BUN/creatinine 5/2.4.  EKG reviewed.  Chest x-ray clear.  Previous echo with EF of 70%.  She received acetaminophen, magnesium, potassium, 472 cc fluid bolus as well as cefepime in the ED.  Case discussed with ED provider who requested admission.  Plan of care was discussed with patient, no visitors at bedside.  Address code status and patient confirms DNR.      Overview/Hospital Course:  Patient presented with worsening hypotension from outpatient dialysis center.  Recent complicated past medical history.  She received IV fluid and was started on Levophed.  CTA abdomen and pelvis obtained with diffuse pneumatosis intestinal most prominent ascending, transverse and descending colon with portal vein gas.  She was started on broad-spectrum antibiotics.  General surgery was consulted and underwent emergent exploratory laparotomy with total colectomy, creation of ileostomy, inadvertent bladder injury requiring repair.  She was extubated postprocedure and transferred to the ICU.  Received 1 unit PRBC and FFP.  On 11/17 reports nausea, abdominal pain, still on Levophed.  11/18 still with significant abdominal pain, no output from ileostomy, receiving dialysis today, running even, still on Levophed, resuming home midodrine and attempting to wean off.      Interval History:  Continues to report generalized abdominal pain associated with nausea without emesis, no output from ileostomy at this time.  Continues with generalized weakness.  Does report mild shortness of breath.  Discussed with nursing, has been intermittently slow to respond, somewhat confused.  Small amount of output from Godwin more bloody in appearance.  Still on Levophed, attempting to decreased dose.  On nasal cannula.  T-max 98.4°.  Blood cultures no growth to date.  Labs with WBC 9.75, hemoglobin 7.6, platelets 74, sodium 131, potassium 3.7,  magnesium 2.2, phosphorus 2.2, BUN/creatinine 13/2.8.  Discussed with patient as able.  Discussed with nursing.  No visitors at bedside.    Review of Systems   Constitutional:  Positive for fatigue.   Respiratory:  Positive for shortness of breath.    Cardiovascular:         Edema hands   Gastrointestinal:  Positive for abdominal pain and nausea.   Neurological:  Positive for weakness.   Psychiatric/Behavioral:  Positive for confusion (intermittent).    Objective:     Vital Signs (Most Recent):  Temp: 98.3 °F (36.8 °C) (11/19/22 0715)  Pulse: 75 (11/19/22 0800)  Resp: 14 (11/19/22 0800)  BP: (!) 171/68 (11/19/22 0800)  SpO2: (!) 93 % (11/19/22 0800)   Vital Signs (24h Range):  Temp:  [98 °F (36.7 °C)-98.4 °F (36.9 °C)] 98.3 °F (36.8 °C)  Pulse:  [66-90] 75  Resp:  [7-52] 14  SpO2:  [88 %-100 %] 93 %  BP: (100-178)/(40-87) 171/68  Arterial Line BP: (107-163)/(33-49) 121/38     Weight: 47.2 kg (104 lb 0.9 oz)  Body mass index is 16.3 kg/m².    Intake/Output Summary (Last 24 hours) at 11/19/2022 0946  Last data filed at 11/19/2022 0745  Gross per 24 hour   Intake 1504.44 ml   Output 855 ml   Net 649.44 ml      Physical Exam  Vitals and nursing note reviewed.   Constitutional:       Comments: Chronically and critically ill appearing, lying in bed, cooperative   HENT:      Head: Normocephalic and atraumatic.      Mouth/Throat:      Mouth: Mucous membranes are moist.   Eyes:      General:         Right eye: No discharge.         Left eye: No discharge.   Neck:      Comments: LIJ in place  Cardiovascular:      Rate and Rhythm: Normal rate and regular rhythm.      Comments: Wearing SCDs  Pulmonary:      Comments: On NC, decreased at bases, no wheeze, no accessory muscle use  Abdominal:      Tenderness: There is abdominal tenderness.      Comments: Scaphoid, ileostomy RLQ, surgical dressing in place, no BS    Genitourinary:     Comments: Godwin in place with minimal bloody urine  Skin:     Comments: Right radial arterial  line, LUE AVF. Edema bilateral hands R>L   Neurological:      Mental Status: She is alert.      Comments: Alert to person, slow to respond, did not answer place or reason for hospital admission, severe generalized weakness   Psychiatric:         Mood and Affect: Mood normal.       Significant Labs: Blood Culture: No results for input(s): LABBLOO in the last 48 hours.  BMP:   Recent Labs   Lab 11/19/22 0408   *   *   K 3.7      CO2 24   BUN 13   CREATININE 2.8*   CALCIUM 7.8*   MG 2.2     CBC:   Recent Labs   Lab 11/18/22 0400 11/19/22 0408   WBC 11.30 9.75   HGB 8.4* 7.6*   HCT 25.6* 23.6*   PLT 94* 74*     CMP:   Recent Labs   Lab 11/18/22 0400 11/19/22 0408   * 131*   K 4.0 3.7   CL 95 103   CO2 22* 24   * 157*   BUN 16 13   CREATININE 4.4* 2.8*   CALCIUM 7.6* 7.8*   PROT 4.5* 4.0*   ALBUMIN 1.9* 1.6*   BILITOT 4.9* 4.4*   ALKPHOS 115 186*   AST 36 47*   ALT 23 33   ANIONGAP 8 4*     Cardiac Markers: No results for input(s): CKMB, MYOGLOBIN, BNP, TROPISTAT in the last 48 hours.  Lactic Acid: No results for input(s): LACTATE in the last 48 hours.  Magnesium:   Recent Labs   Lab 11/18/22 0400 11/19/22 0408   MG 1.7 2.2       Significant Imaging: I have reviewed all pertinent imaging results/findings within the past 24 hours.      Assessment/Plan:      Active Hospital Problems    Diagnosis    *Status post total colectomy    Ischemic colitis s/p laprotomy with total colectomy and ileostomy, 11/17    Ischemic colon    Generalized weakness    Encephalopathy, metabolic, fluctuating    Hematuria    Hypophosphatemia    Hyponatremia    Bladder injury    Hypotension, acute on chronic    QT prolongation    Thrombocytopenia    S/P ureteral stent placement    ESRD on dialysis MWF    Bilateral LE neuropathy    Severe malnutrition    Hypothyroidism    Anemia of chronic disease    Hisotry of Vulvar cancer     Plan:  Continue care in ICU with continuous cardiac telemetry monitoring  Postop  care as per General surgery   Currently NPO except for medications/ice chips, await return of bowel function, no output from ileostomy  Fluctuating mentation, cefepime can cause encephalopathy, given negative cultures discontinue, check ammonia and lactic acid, exam is nonfocal, following  Continue TPN for nutrition   Continue Levophed, resumed on midodrine 5 mg t.i.d., titrate for map greater than 60   Echocardiogram with EF of 75%   Urine from lee slightly more bloody, will monitor for today, not on anticoagulation, platelet levels moderately low, urine culture negative  Continue p.r.n. antiemetic and analgesic  Bladder injury intraoperatively, keep Lee catheter in place for at least 2 weeks  Renally dosing all medications and avoid nephrotoxin drugs  Fall and delirium precautions  A.m. labs ordered  Appreciate all consultant's input   Increase activity, PT/OT ordered   Seen by Urology, , Lee catheter in place at least 2 weeks, cystogram prior to removal, will need stent removed in few months, follow-up with Dr. Barber  Further plan as per clinical course      VTE Risk Mitigation (From admission, onward)           Ordered     IP VTE HIGH RISK PATIENT  Once         11/16/22 1845     Place sequential compression device  Until discontinued         11/16/22 1845                    Discharge Planning   RODRIGO:      Code Status: DNR   Is the patient medically ready for discharge?:     Reason for patient still in hospital (select all that apply): Patient unstable  Discharge Plan A: Home Health                  Dana Ovalle MD  Department of Hospital Medicine   [facility]

## 2022-11-19 NOTE — PT/OT/SLP EVAL
Occupational Therapy   Evaluation    Name: Shara Hutchins  MRN: [id number]  Admitting Diagnosis:  Status post total colectomy  Recent Surgery: Procedure(s) (LRB):  LAPAROTOMY, EXPLORATORY (N/A)  CLOSURE, COLOSTOMY  COLECTOMY, TOTAL  REPAIR, BLADDER 3 Days Post-Op    Recommendations:     Discharge Recommendations: rehabilitation facility  Discharge Equipment Recommendations:   (TBD)  Barriers to discharge:  Decreased caregiver support    Assessment:     Shara Hutchins is a 72 y.o. female with a medical diagnosis of Status post total colectomy.   Performance deficits affecting function: weakness, impaired endurance, impaired self care skills, impaired functional mobility, gait instability, impaired balance, impaired cognition, decreased safety awareness, pain, impaired cardiopulmonary response to activity.      Pt presented supine, initially very lethargic; with repeated stimulation she became more alert and followed intermittent commands; pt participated in bed mobility and bed level ADLs; pt complained of pain with all movement in bed; will progress as pt tolerates.     Rehab Prognosis: Good; patient would benefit from acute skilled OT services to address these deficits and reach maximum level of function.       Plan:     Patient to be seen 6 x/week to address the above listed problems via self-care/home management, therapeutic activities, therapeutic exercises, cognitive retraining  Plan of Care Expires: 12/19/22  Plan of Care Reviewed with: patient    Subjective     Chief Complaint: abdominal pain  Patient/Family Comments/goals: not stated     Occupational Profile:  Living Environment: Pt lives at [facility] (info taken from chart)  Previous level of function: Uknown; pt poor historian today  Roles and Routines: unknown   Equipment Used at Home:   (unknown)  Assistance upon Discharge: unknown     Pain/Comfort:  Pain Rating 1: abdominal pain with movement; pt unable to rate; braced with pillow during movement    Pain Rating Post-Intervention 1: abdominal pain with movement; pt unable to rate; nurse notified    Objective:   Communicated with: nurse prior to session.  Patient found supine with telemetry, peripheral IV, blood pressure cuff, pulse ox (continuous), oxygen, colostomy upon OT entry to room.     General Precautions: Standard, fall   Orthopedic Precautions:N/A   Braces: N/A  Respiratory Status: Nasal cannula, flow 2 L/min     Occupational Performance:     Bed Mobility:    Patient completed Rolling/Turning to Left with  maximal assistance for linen change  Patient completed Rolling/Turning to Right with maximal assistance for linen change  Patient scooted to HOB with total assistance x 2 via drawsheet     Activities of Daily Living:  Grooming: stand by assistance with HOB elevated pt wiped her face with a warm towel after repeated verba/visual/tactile cues were provided by OT     Cognitive/Visual Perceptual:  Cognitive/Psychosocial Skills:     -       Oriented to: Person   -       Follows Commands/attention:follows one step commands intermittently     Physical Exam:  Unable to formally assess UE strength/coordination due to decreased alertness/command follow; TBA as pt tolerates; mild swelling noted in BUE; UE's positioned/elevated on pillows      AMPAC 6 Click ADL:  AMPAC Total Score: 17     Patient left HOB elevated with all lines intact, call button in reach, and bed alarm on      GOALS:   Multidisciplinary Problems       Occupational Therapy Goals          Problem: Occupational Therapy    Goal Priority Disciplines Outcome Interventions   Occupational Therapy Goal     OT, PT/OT     Description: Goals to be met by: 12/18/22     Patient will increase functional independence with ADLs by performing:    UE Dressing with Stand-by Assistance.  LE Dressing with Minimal Assistance and Assistive Devices as needed.  Grooming while standing at sink with Minimal Assistance.  Toileting from toilet with Minimal Assistance for  hygiene and clothing management.   Toilet transfer to toilet with Minimal Assistance.                         History:     Past Medical History:   Diagnosis Date    Cellulitis of trunk     Perineum, numerous episodes    Chronic diarrhea 02/10/2020    Short-gut syndrome    End stage renal disease on dialysis 01/07/2018    Fatigue     Hypertension     Hypothyroidism 02/10/2020    Iron deficiency anemia due to chronic blood loss 01/07/2018    Pulmonary hypertension 03/20/2020    Vulvar cancer 01/07/2019         Past Surgical History:   Procedure Laterality Date    ABDOMINAL SURGERY      ANGIOGRAPHY OF ARTERIOVENOUS SHUNT N/A 01/19/2022    Procedure: Fistulogram with Possible Intervention;  Surgeon: Raz Maher MD;  Location: [facility] CATH/EP LAB;  Service: Cardiothoracic;  Laterality: N/A;    APPENDECTOMY      AV FISTULA PLACEMENT Left 2018    BACK SURGERY      BLADDER FULGURATION N/A 06/16/2020    Procedure: FULGURATION, BLADDER;  Surgeon: Damari Barber Jr., MD;  Location: [facility] OR;  Service: Urology;  Laterality: N/A;    BLADDER REPAIR  11/16/2022    Procedure: REPAIR, BLADDER;  Surgeon: Kannan Broderick MD;  Location: [facility] OR;  Service: General;;    carpel tunnel surgery once on each hand      CERVICAL FUSION      x 4    cervix repair      CHOLECYSTECTOMY  2000    COLECTOMY      COLECTOMY, TOTAL  11/16/2022    Procedure: COLECTOMY, TOTAL;  Surgeon: Kannan Broderick MD;  Location: [facility] OR;  Service: General;;    CYSTOSCOPY W/ RETROGRADES  06/16/2020    Procedure: CYSTOSCOPY, WITH RETROGRADE PYELOGRAM;  Surgeon: Damari Barber Jr., MD;  Location: [facility];  Service: Urology;;    CYSTOSCOPY W/ RETROGRADES Right 04/20/2021    Procedure: CYSTOSCOPY, WITH RETROGRADE PYELOGRAM;  Surgeon: Damari Barber Jr., MD;  Location: [facility] OR;  Service: Urology;  Laterality: Right;    CYSTOSCOPY W/ URETERAL STENT PLACEMENT Right 03/16/2021    Procedure: CYSTOSCOPY, WITH URETERAL STENT INSERTION;  Surgeon: Damari Barber Jr., MD;   Location: [facility] OR;  Service: Urology;  Laterality: Right;    CYSTOSCOPY W/ URETERAL STENT PLACEMENT Right 05/18/2021    Procedure: CYSTOSCOPY, WITH URETERAL STENT INSERTION;  Surgeon: Damari Barber Jr., MD;  Location: [facility] OR;  Service: Urology;  Laterality: Right;    CYSTOSCOPY W/ URETERAL STENT PLACEMENT Right 06/22/2022    Procedure: CYSTOSCOPY, WITH URETERAL STENT INSERTION;  Surgeon: Gaby Garcia MD;  Location: [facility] OR;  Service: Urology;  Laterality: Right;  cysto stent exchange    CYSTOSCOPY W/ URETERAL STENT REMOVAL Right 04/20/2021    Procedure: CYSTOSCOPY, WITH URETERAL STENT REMOVAL;  Surgeon: Damari Barber Jr., MD;  Location: [facility] OR;  Service: Urology;  Laterality: Right;    CYSTOSCOPY WITH URETEROSCOPY, RETROGRADE PYELOGRAPHY, AND INSERTION OF STENT Right 03/30/2021    Procedure: CYSTOSCOPY, WITH RETROGRADE PYELOGRAM AND URETERAL STENT INSERTION;  Surgeon: Damari Barber Jr., MD;  Location: [facility] OR;  Service: Urology;  Laterality: Right;    CYSTOURETEROSCOPY WITH RETROGRADE PYELOGRAPHY AND INSERTION OF STENT INTO URETER Right 01/18/2022    Procedure: CYSTOURETEROSCOPY, WITH RETROGRADE PYELOGRAM AND URETERAL STENT INSERTION;  Surgeon: Damari Barber Jr., MD;  Location: [facility];  Service: Urology;  Laterality: Right;    CYSTOURETEROSCOPY WITH RETROGRADE PYELOGRAPHY AND INSERTION OF STENT INTO URETER Right 8/30/2022    Procedure: CYSTOURETEROSCOPY, WITH RETROGRADE PYELOGRAM AND URETERAL STENT INSERTION;  Surgeon: Damari Barber Jr., MD;  Location: [facility] OR;  Service: Urology;  Laterality: Right;    DIAGNOSTIC LAPAROSCOPY N/A 02/13/2020    Procedure: LAPAROSCOPY, DIAGNOSTIC;  Surgeon: Robbi Lovell III, MD;  Location: [facility] OR;  Service: General;  Laterality: N/A;    HYSTERECTOMY  1980    ILEOSTOMY N/A 02/13/2020    Procedure: CREATION, ILEOSTOMY Loop;  Surgeon: Robbi Lovell III, MD;  Location: [facility] OR;  Service: General;  Laterality: N/A;    ILEOSTOMY CLOSURE N/A 05/18/2020     Procedure: CLOSURE, ILEOSTOMY;  Surgeon: Robbi Lovell III, MD;  Location: [facility] OR;  Service: General;  Laterality: N/A;    LASER LITHOTRIPSY Right 03/30/2021    Procedure: LITHOTRIPSY, USING LASER;  Surgeon: Damari Barber Jr., MD;  Location: [facility] OR;  Service: Urology;  Laterality: Right;    LUMBAR LAMINECTOMY      LYSIS OF ADHESIONS N/A 02/13/2020    Procedure: LYSIS, ADHESIONS;  Surgeon: Robbi Lovell III, MD;  Location: [facility] OR;  Service: General;  Laterality: N/A;    NECK SURGERY      OSTEOTOMY  9/7/2022    Procedure: OSTEOTOMY;  Surgeon: Brigette Chavez MD;  Location: [facility] OR;  Service: General;;    PERCUTANEOUS TRANSLUMINAL ANGIOPLASTY OF ARTERIOVENOUS FISTULA N/A 01/19/2022    Procedure: PTA, AV FISTULA;  Surgeon: Raz Maher MD;  Location: [facility] CATH/EP LAB;  Service: Cardiothoracic;  Laterality: N/A;    PHLEBOGRAPHY  02/28/2020    Procedure: VENOGRAM;  Surgeon: Robbi Lovell III, MD;  Location: [facility] OR;  Service: General;;    REMOVAL OF VASCULAR ACCESS PORT Right 02/13/2020    Procedure: REMOVAL, VASCULAR ACCESS PORT;  Surgeon: Robbi Lovell III, MD;  Location: [facility] OR;  Service: General;  Laterality: Right;    RETROGRADE PYELOGRAPHY  03/16/2021    Procedure: PYELOGRAM, RETROGRADE;  Surgeon: Damari Barber Jr., MD;  Location: [facility] OR;  Service: Urology;;    RETROGRADE PYELOGRAPHY Right 05/18/2021    Procedure: PYELOGRAM, RETROGRADE;  Surgeon: Damari Barber Jr., MD;  Location: [facility] OR;  Service: Urology;  Laterality: Right;    RETROGRADE PYELOGRAPHY  06/22/2022    Procedure: PYELOGRAM, RETROGRADE;  Surgeon: Gaby Garcia MD;  Location: [facility] OR;  Service: Urology;;    SHOULDER SURGERY      TONSILLECTOMY      URETEROSCOPIC REMOVAL OF URETERIC CALCULUS Right 03/30/2021    Procedure: REMOVAL, CALCULUS, URETER, URETEROSCOPIC;  Surgeon: Damari Barber Jr., MD;  Location: [facility] OR;  Service: Urology;  Laterality: Right;    URETEROSCOPIC REMOVAL OF URETERIC CALCULUS Right  04/20/2021    Procedure: REMOVAL, CALCULUS, URETER, URETEROSCOPIC;  Surgeon: Damari Barber Jr., MD;  Location: [facility] OR;  Service: Urology;  Laterality: Right;    URETEROSCOPIC REMOVAL OF URETERIC CALCULUS Right 8/30/2022    Procedure: REMOVAL, CALCULUS, URETER, URETEROSCOPIC;  Surgeon: Damari Barber Jr., MD;  Location: [facility] OR;  Service: Urology;  Laterality: Right;    vulva cancer         Time Tracking:     OT Date of Treatment: 11/19/22  OT Start Time: 1045  OT Stop Time: 1110  OT Total Time (min): 25 min    Billable Minutes:Evaluation 10  Self Care/Home Management 05  Therapeutic Activity 10    11/19/2022

## 2022-11-19 NOTE — PLAN OF CARE
Problem: Parenteral Nutrition  Goal: Effective Intravenous Nutrition Therapy Delivery  Outcome: Ongoing, Progressing  Intervention: Optimize Intravenous Nutrition Delivery  Flowsheets (Taken 11/19/2022 1148)  Nutrition Support Management: (new TPN ordered) other (see comments)     Problem: Oral Intake Inadequate  Goal: Improved Oral Intake  Intervention: Promote and Optimize Oral Intake  Flowsheets (Taken 11/19/2022 114)  Oral Nutrition Promotion: calorie-dense liquids provided

## 2022-11-19 NOTE — PROGRESS NOTES
INPATIENT NEPHROLOGY Progress Notes  [facility]    Patient Name: Shara Hutchins  Date: 11/19/2022    Reason for consultation: ESRD    Chief Complaint:   Chief Complaint   Patient presents with    Hypotension     Pt presents to the ER via EMS after being hypotensive at dialysis. Pt had systolic BP of 60 at dialysis.        History of Present Illness:  Ms. Hutchins is a 72-year-old female who was brought to the ED via EMS from outpatient dialysis center due to hypotension.  Patient with complicated recent history, about 2 months ago, admitted with ischemic colitis status post laparotomy with colostomy, stated she stayed in the hospital for about 1 month, she was discharge but had fall at assisted living facility and re-presented to the hospital following day, since then has had persistent hypotension, antihypertensives were discontinued, she was started on midodrine.  She was subsequently discharged to inpatient rehab and states she had returned to assisted living facility about 2 weeks ago.  Known history of ESRD on HD MWF.  She states blood pressure has been running low since previous admission, yesterday midodrine was increased from 5 mg t.i.d. to 10 mg t.i.d.. States her appetite has been low, over the last 24 hours has noticed decreased output from her colostomy, chronic nausea without any recent change, no significant increased abdominal pain.  States at dialysis today, near end of session, needle got dislodged from fistula, about 300 cc of blood loss, states following this she became lightheaded/dizzy, blood pressure 60/30 and she was referred to the ED.  States with dialysis recently they have not been removing fluid due to hypotension.  States she does have home health at assisted living facility.  In the ED blood pressure was low, 94/56, received fluid bolus, subsequently started on Levophed, during my encounter blood pressure 116/57 on Levophed 0.3 mcg/kg/min.  Patient is currently reporting  back pain.  Labs with WBC 11.36, hemoglobin 8.6, platelet 162, potassium 1.9, magnesium 1.2, albumin 2.2, lactic acid 2.2, BUN/creatinine 5/2.4.  EKG reviewed.  Chest x-ray clear.  Previous echo with EF of 70%.  She received acetaminophen, magnesium, potassium, 472 cc fluid bolus as well as cefepime in the ED. She was started on Levophed.  CTA abdomen and pelvis obtained with diffuse pneumatosis intestinal most prominent ascending, transverse and descending colon with portal vein gas.  She was started on broad-spectrum antibiotics.  General surgery was consulted and underwent emergent exploratory laparotomy with total colectomy, creation of ileostomy, inadvertent bladder injury requiring repair.  She was extubated postprocedure and transferred to the ICU.  Received 1 unit PRBC and FFP.  On 11/17 reports nausea, abdominal pain, still on Levophed. We are consulted for dialysis.    Interval History:  11/17- weaning pressors, stopped IVFs, can remove lee if ok with urology  11/18- still on levophed- reviewed urology note regarding lee- seen on HD- UF even  11/19- worsening mental status this AM- remains on levophed- weak today    Plan of Care:    Assessment:  Ischemic colitis s/p laprotomy with total colectomy and ileostomy, 11/17  ESRD on HD MWF  Septic shock  Hyponatremia  Hypokalemia  Lactic acidosis  SHPT  Anemia of CKD    Plan:    - continue septic shock management- continue lee- dose antbx for CrCl < 10/HD  - continue HD MWF  - wean levophed- continue midodrine  - UF even  - continue TPN  - adjust dialysate  - BMM at goal  - transfuse 1u of blood today without dialysis- continue ADAM with HD    Thank you for allowing us to participate in this patient's care. We will continue to follow.    Vital Signs:  Temp Readings from Last 3 Encounters:   11/19/22 98.3 °F (36.8 °C) (Oral)   10/29/22 98.5 °F (36.9 °C) (Oral)   09/27/22 97.4 °F (36.3 °C)       Pulse Readings from Last 3 Encounters:   11/19/22 72   10/29/22  91   09/27/22 74       BP Readings from Last 3 Encounters:   11/19/22 (!) 168/64   10/29/22 (!) 105/58   09/27/22 101/61       Weight:  Wt Readings from Last 3 Encounters:   11/17/22 47.2 kg (104 lb 0.9 oz)   11/17/22 47.2 kg (104 lb)   10/29/22 50.8 kg (112 lb)       Medications:  Scheduled Meds:   chlorhexidine  15 mL Mouth/Throat BID    epoetin nica-ebpx (RETACRIT) injection  100 Units/kg Intravenous Every Mon, Wed, Fri    levothyroxine  88 mcg Oral Before breakfast    midodrine  5 mg Oral TID    mupirocin   Nasal BID     Continuous Infusions:   NORepinephrine bitartrate-D5W 0.25 mcg/kg/min (11/19/22 0813)    TPN ADULT CENTRAL LINE CUSTOM (3 in 1) 50 mL/hr at 11/18/22 1747     PRN Meds:.acetaminophen, calcium gluconate IVPB, calcium gluconate IVPB, calcium gluconate IVPB, dextrose 10%, dextrose 10%, glucagon (human recombinant), glucose, glucose, HYDROcodone-acetaminophen, HYDROmorphone, LIDOcaine HCl 2%, magnesium sulfate IVPB, magnesium sulfate IVPB, naloxone, ondansetron, potassium chloride in water **AND** potassium chloride in water **AND** potassium chloride in water, prochlorperazine, senna-docusate 8.6-50 mg, sodium chloride 0.9%, sodium phosphate IVPB, sodium phosphate IVPB, sodium phosphate IVPB  Current Facility-Administered Medications on File Prior to Encounter   Medication Dose Route Frequency Provider Last Rate Last Admin    electrolyte-S (ISOLYTE)   Intravenous Continuous Raz Ruff MD        lactated ringers infusion   Intravenous Continuous Ruy Beltre MD   New Bag at 11/16/22 1938     Current Outpatient Medications on File Prior to Encounter   Medication Sig Dispense Refill    cholecalciferol, vitamin D3, (VITAMIN D3) 25 mcg (1,000 unit) capsule Take 1 capsule (1,000 Units total) by mouth once daily. 10 capsule 0    dronabinoL (MARINOL) 2.5 MG capsule Take 2.5 mg by mouth 2 (two) times daily before meals.      ergocalciferol (ERGOCALCIFEROL) 50,000 unit Cap Take 50,000 Units by  mouth every 7 days. MONDAYS      ferric citrate (AURYXIA) 210 mg iron Tab Take 2 tablets by mouth 3 (three) times daily with meals.      gabapentin (NEURONTIN) 100 MG capsule Take 100 mg by mouth 2 (two) times daily.      hydrALAZINE (APRESOLINE) 50 MG tablet Take 1 tablet (50 mg total) by mouth every 8 (eight) hours. 270 tablet 0    hydrOXYzine HCL (ATARAX) 25 MG tablet Take 1 tablet (25 mg total) by mouth 3 (three) times daily as needed for Itching.      levothyroxine (SYNTHROID) 88 MCG tablet TAKE 1 TABLET BEFORE BREAKFAST (Patient taking differently: Take 88 mcg by mouth before breakfast. TAKE 1 TABLET BEFORE BREAKFAST) 90 tablet 3    LORazepam (ATIVAN) 0.5 MG tablet Take 1 tablet (0.5 mg total) by mouth every evening. 90 tablet 1    midodrine (PROAMATINE) 5 MG Tab Take 5 mg by mouth 3 (three) times daily.      mirtazapine (REMERON) 15 MG tablet Take 7.5 mg by mouth every evening.      promethazine (PHENERGAN) 25 MG tablet TAKE 1 TABLET (25 MG TOTAL) BY MOUTH 2 (TWO) TIMES DAILY AS NEEDED FOR NAUSEA. 60 tablet 0    rOPINIRole (REQUIP) 0.5 MG tablet Take 0.5 mg by mouth every 8 (eight) hours.      sertraline (ZOLOFT) 50 MG tablet Take 1 tablet (50 mg total) by mouth every evening. Take 50 mg by mouth every evening. 90 tablet 1    simethicone (MYLICON) 80 MG chewable tablet Take 80 mg by mouth every 6 (six) hours as needed.      traMADoL (ULTRAM) 50 mg tablet Take 1 tablet by mouth 4 (four) times daily as needed.      zinc gluconate 50 mg tablet Take 50 mg by mouth once daily.      cholestyramine (QUESTRAN) 4 gram packet Take 4 g by mouth 2 (two) times daily.      diltiaZEM (CARDIZEM CD) 120 MG Cp24 Take 1 capsule (120 mg total) by mouth once daily. 90 capsule 3    ferrous sulfate (FEOSOL) 325 mg (65 mg iron) Tab tablet 325 mg.      oxyCODONE (OXY-IR) 5 mg Cap Take 5 mg by mouth.      oxyCODONE-acetaminophen (PERCOCET) 5-325 mg per tablet Take 1 tablet by mouth every 6 (six) hours as needed for Pain. 12 tablet 0     sevelamer carbonate (RENVELA) 800 mg Tab 800 mg.      zinc sulfate (ZINCATE) 50 mg zinc (220 mg) capsule Take 1 capsule by mouth once daily.      [DISCONTINUED] miconazole (MICOTIN) 2 % cream Apply topically 2 (two) times daily. 28 g 0     Review of Systems:  Neg    Physical Exam:  Constitutional: nad, aao x 1  Heart: rrr, no m/r/g, wwp, no edema  Lungs: ctab, no w/r/r/c, no lb  Abdomen: s/nt/nd, +BS      Results:  Lab Results   Component Value Date     (L) 11/19/2022    K 3.7 11/19/2022     11/19/2022    CO2 24 11/19/2022    BUN 13 11/19/2022    CREATININE 2.8 (H) 11/19/2022    CALCIUM 7.8 (L) 11/19/2022    ANIONGAP 4 (L) 11/19/2022    ESTGFRAFRICA 12.5 (A) 07/20/2022    EGFRNONAA 10.9 (A) 07/20/2022       Lab Results   Component Value Date    CALCIUM 7.8 (L) 11/19/2022    PHOS 2.2 (L) 11/19/2022       Recent Labs   Lab 11/19/22  0408   WBC 9.75   RBC 2.27*   HGB 7.6*   HCT 23.6*   PLT 74*   *   MCH 33.5*   MCHC 32.2         I have personally reviewed pertinent radiological imaging and reports.    I have spent > 35 minutes providing care for this patient for the above diagnoses. These services have included chart/data/imaging review, evaluation, exam, formulation of plan, , note preparation, and discussions with staff involved in this patient's care.    Yasmin Parker MD MPH  [facility] [address]  [place] [address]  [phone] (p)  [phone] (f)

## 2022-11-19 NOTE — SUBJECTIVE & OBJECTIVE
Interval History:  Continues to report generalized abdominal pain associated with nausea without emesis, no output from ileostomy at this time.  Continues with generalized weakness.  Does report mild shortness of breath.  Discussed with nursing, has been intermittently slow to respond, somewhat confused.  Small amount of output from Godwin more bloody in appearance.  Still on Levophed, attempting to decreased dose.  On nasal cannula.  T-max 98.4°.  Blood cultures no growth to date.  Labs with WBC 9.75, hemoglobin 7.6, platelets 74, sodium 131, potassium 3.7, magnesium 2.2, phosphorus 2.2, BUN/creatinine 13/2.8.  Discussed with patient as able.  Discussed with nursing.  No visitors at bedside.    Review of Systems   Constitutional:  Positive for fatigue.   Respiratory:  Positive for shortness of breath.    Cardiovascular:         Edema hands   Gastrointestinal:  Positive for abdominal pain and nausea.   Neurological:  Positive for weakness.   Psychiatric/Behavioral:  Positive for confusion (intermittent).    Objective:     Vital Signs (Most Recent):  Temp: 98.3 °F (36.8 °C) (11/19/22 0715)  Pulse: 75 (11/19/22 0800)  Resp: 14 (11/19/22 0800)  BP: (!) 171/68 (11/19/22 0800)  SpO2: (!) 93 % (11/19/22 0800)   Vital Signs (24h Range):  Temp:  [98 °F (36.7 °C)-98.4 °F (36.9 °C)] 98.3 °F (36.8 °C)  Pulse:  [66-90] 75  Resp:  [7-52] 14  SpO2:  [88 %-100 %] 93 %  BP: (100-178)/(40-87) 171/68  Arterial Line BP: (107-163)/(33-49) 121/38     Weight: 47.2 kg (104 lb 0.9 oz)  Body mass index is 16.3 kg/m².    Intake/Output Summary (Last 24 hours) at 11/19/2022 0946  Last data filed at 11/19/2022 0745  Gross per 24 hour   Intake 1504.44 ml   Output 855 ml   Net 649.44 ml      Physical Exam  Vitals and nursing note reviewed.   Constitutional:       Comments: Chronically and critically ill appearing, lying in bed, cooperative   HENT:      Head: Normocephalic and atraumatic.      Mouth/Throat:      Mouth: Mucous membranes are moist.    Eyes:      General:         Right eye: No discharge.         Left eye: No discharge.   Neck:      Comments: LIJ in place  Cardiovascular:      Rate and Rhythm: Normal rate and regular rhythm.      Comments: Wearing SCDs  Pulmonary:      Comments: On NC, decreased at bases, no wheeze, no accessory muscle use  Abdominal:      Tenderness: There is abdominal tenderness.      Comments: Scaphoid, ileostomy RLQ, surgical dressing in place, no BS    Genitourinary:     Comments: Godwin in place with minimal bloody urine  Skin:     Comments: Right radial arterial line, LUE AVF. Edema bilateral hands R>L   Neurological:      Mental Status: She is alert.      Comments: Alert to person, slow to respond, did not answer place or reason for hospital admission, severe generalized weakness   Psychiatric:         Mood and Affect: Mood normal.       Significant Labs: Blood Culture: No results for input(s): LABBLOO in the last 48 hours.  BMP:   Recent Labs   Lab 11/19/22 0408   *   *   K 3.7      CO2 24   BUN 13   CREATININE 2.8*   CALCIUM 7.8*   MG 2.2     CBC:   Recent Labs   Lab 11/18/22 0400 11/19/22 0408   WBC 11.30 9.75   HGB 8.4* 7.6*   HCT 25.6* 23.6*   PLT 94* 74*     CMP:   Recent Labs   Lab 11/18/22 0400 11/19/22 0408   * 131*   K 4.0 3.7   CL 95 103   CO2 22* 24   * 157*   BUN 16 13   CREATININE 4.4* 2.8*   CALCIUM 7.6* 7.8*   PROT 4.5* 4.0*   ALBUMIN 1.9* 1.6*   BILITOT 4.9* 4.4*   ALKPHOS 115 186*   AST 36 47*   ALT 23 33   ANIONGAP 8 4*     Cardiac Markers: No results for input(s): CKMB, MYOGLOBIN, BNP, TROPISTAT in the last 48 hours.  Lactic Acid: No results for input(s): LACTATE in the last 48 hours.  Magnesium:   Recent Labs   Lab 11/18/22 0400 11/19/22 0408   MG 1.7 2.2       Significant Imaging: I have reviewed all pertinent imaging results/findings within the past 24 hours.

## 2022-11-19 NOTE — PLAN OF CARE
POC reviewed with patient. Oriented to self. Calm, sleeping most of day. Easily awakened. Follows commands. Afebrile. Levophed for pressure support. 1 unit of PRBCs given. Patient tolerated well. Urine that was pink/red this am clearing to a pink/yellow color. Godwin to remain in place. Abdomen  to touch and movement. Encouraged patient to splint with pillow. Ileostomy with 10 cc of clear liquid noted- Dr. Nas delgadillo. PT and OT saw patient today-see notes. Safety measures in place.     Problem: Adult Inpatient Plan of Care  Goal: Plan of Care Review  Outcome: Ongoing, Progressing     Problem: Adult Inpatient Plan of Care  Goal: Patient-Specific Goal (Individualized)  Outcome: Ongoing, Progressing     Problem: Adult Inpatient Plan of Care  Goal: Absence of Hospital-Acquired Illness or Injury  Outcome: Ongoing, Progressing     Problem: Adult Inpatient Plan of Care  Goal: Optimal Comfort and Wellbeing  Outcome: Ongoing, Progressing     Problem: Bleeding (Sepsis/Septic Shock)  Goal: Absence of Bleeding  Outcome: Ongoing, Progressing     Problem: Infection Progression (Sepsis/Septic Shock)  Goal: Absence of Infection Signs and Symptoms  Outcome: Ongoing, Progressing     Problem: Nutrition Impaired (Sepsis/Septic Shock)  Goal: Optimal Nutrition Intake  Outcome: Ongoing, Progressing     Problem: Renal Function Impairment (Acute Kidney Injury/Impairment)  Goal: Effective Renal Function  Outcome: Ongoing, Progressing     Problem: Infection (Pneumonia)  Goal: Resolution of Infection Signs and Symptoms  Outcome: Ongoing, Progressing     Problem: Respiratory Compromise (Pneumonia)  Goal: Effective Oxygenation and Ventilation  Outcome: Ongoing, Progressing     Problem: Fall Injury Risk  Goal: Absence of Fall and Fall-Related Injury  Outcome: Ongoing, Progressing

## 2022-11-20 PROBLEM — E83.39 HYPOPHOSPHATEMIA: Status: RESOLVED | Noted: 2022-01-01 | Resolved: 2022-01-01

## 2022-11-20 PROBLEM — E87.1 HYPONATREMIA: Status: RESOLVED | Noted: 2022-01-01 | Resolved: 2022-01-01

## 2022-11-20 PROBLEM — R31.9 HEMATURIA: Status: RESOLVED | Noted: 2022-01-01 | Resolved: 2022-01-01

## 2022-11-20 NOTE — PLAN OF CARE
Problem: Adult Inpatient Plan of Care  Goal: Plan of Care Review  Outcome: Ongoing, Progressing  Goal: Patient-Specific Goal (Individualized)  Outcome: Ongoing, Progressing  Goal: Absence of Hospital-Acquired Illness or Injury  Outcome: Ongoing, Progressing  Goal: Optimal Comfort and Wellbeing  Outcome: Ongoing, Progressing  Goal: Readiness for Transition of Care  Outcome: Ongoing, Progressing     Problem: Adjustment to Illness (Sepsis/Septic Shock)  Goal: Optimal Coping  Outcome: Ongoing, Progressing     Problem: Bleeding (Sepsis/Septic Shock)  Goal: Absence of Bleeding  Outcome: Ongoing, Progressing     Problem: Glycemic Control Impaired (Sepsis/Septic Shock)  Goal: Blood Glucose Level Within Desired Range  Outcome: Ongoing, Progressing     Problem: Infection Progression (Sepsis/Septic Shock)  Goal: Absence of Infection Signs and Symptoms  Outcome: Ongoing, Progressing     Problem: Nutrition Impaired (Sepsis/Septic Shock)  Goal: Optimal Nutrition Intake  Outcome: Ongoing, Progressing     Problem: Fluid and Electrolyte Imbalance (Acute Kidney Injury/Impairment)  Goal: Fluid and Electrolyte Balance  Outcome: Ongoing, Progressing     Problem: Oral Intake Inadequate (Acute Kidney Injury/Impairment)  Goal: Optimal Nutrition Intake  Outcome: Ongoing, Progressing     Problem: Renal Function Impairment (Acute Kidney Injury/Impairment)  Goal: Effective Renal Function  Outcome: Ongoing, Progressing     Problem: Fluid Imbalance (Pneumonia)  Goal: Fluid Balance  Outcome: Ongoing, Progressing     Problem: Infection (Pneumonia)  Goal: Resolution of Infection Signs and Symptoms  Outcome: Ongoing, Progressing     Problem: Respiratory Compromise (Pneumonia)  Goal: Effective Oxygenation and Ventilation  Outcome: Ongoing, Progressing     Problem: Infection  Goal: Absence of Infection Signs and Symptoms  Outcome: Ongoing, Progressing     Problem: Impaired Wound Healing  Goal: Optimal Wound Healing  Outcome: Ongoing, Progressing      Problem: Fall Injury Risk  Goal: Absence of Fall and Fall-Related Injury  Outcome: Ongoing, Progressing     Problem: Skin Injury Risk Increased  Goal: Skin Health and Integrity  Outcome: Ongoing, Progressing     Problem: Parenteral Nutrition  Goal: Effective Intravenous Nutrition Therapy Delivery  Outcome: Ongoing, Progressing     Problem: Device-Related Complication Risk (Hemodialysis)  Goal: Safe, Effective Therapy Delivery  Outcome: Ongoing, Progressing     Problem: Hemodynamic Instability (Hemodialysis)  Goal: Effective Tissue Perfusion  Outcome: Ongoing, Progressing     Problem: Infection (Hemodialysis)  Goal: Absence of Infection Signs and Symptoms  Outcome: Ongoing, Progressing     Problem: Oral Intake Inadequate  Goal: Improved Oral Intake  Outcome: Ongoing, Progressing

## 2022-11-20 NOTE — SUBJECTIVE & OBJECTIVE
Interval History:  Patient continues with encephalopathy today, she did not verbalize during my encounter but appears to be moving extremities spontaneously however not to command.  RN states she did take her oral medications this morning but with much effort.  Levophed has been weaned down in the interval, afebrile with T-max 98.5°, on nasal cannula.  Received unit of PRBC yesterday.  Labs with hemoglobin 8.6, platelets 67, BUN/creatinine 22/3.5, albumin 1.6.  Documented urine output 420 cc.  Urine output today is clear/yellow.  Discussed with ICU RN.  No visitors at bedside.    Review of Systems   Unable to perform ROS: Mental status change   Objective:     Vital Signs (Most Recent):  Temp: 98 °F (36.7 °C) (11/20/22 0301)  Pulse: 73 (11/20/22 0700)  Resp: 11 (11/20/22 0700)  BP: (!) 182/76 (11/20/22 0700)  SpO2: (!) 93 % (11/20/22 0700)   Vital Signs (24h Range):  Temp:  [96.9 °F (36.1 °C)-98.4 °F (36.9 °C)] 98 °F (36.7 °C)  Pulse:  [59-84] 73  Resp:  [9-42] 11  SpO2:  [82 %-99 %] 93 %  BP: (134-182)/(42-77) 182/76  Arterial Line BP: (113-188)/(33-52) 135/43     Weight: 47.2 kg (104 lb 0.9 oz)  Body mass index is 16.3 kg/m².    Intake/Output Summary (Last 24 hours) at 11/20/2022 0954  Last data filed at 11/20/2022 0505  Gross per 24 hour   Intake 1073.6 ml   Output 360 ml   Net 713.6 ml      Physical Exam  Vitals and nursing note reviewed.   Constitutional:       Comments: Chronically and critically ill appearing, lying in bed, cooperative   HENT:      Head: Normocephalic and atraumatic.      Mouth/Throat:      Mouth: Mucous membranes are moist.   Eyes:      General:         Right eye: No discharge.         Left eye: No discharge.   Neck:      Comments: LIJ in place  Cardiovascular:      Rate and Rhythm: Normal rate and regular rhythm.      Comments: Wearing SCDs  Pulmonary:      Comments: On NC, decreased at bases, no wheeze, no accessory muscle use  Abdominal:      Tenderness: There is abdominal tenderness.       Comments: Scaphoid, ileostomy RLQ, surgical dressing in place, dark green output seen in ileostomy   Genitourinary:     Comments: Godwin in place with minimal bloody urine  Skin:     Comments: Right radial arterial line, LUE AVF. Edema bilateral hands R>L   Neurological:      Mental Status: She is alert.      Comments: Eyes open, appears to be moving all 4 extremities spontaneously but not to command, did not verbalize during my encounter        Significant Labs: Blood Culture: No results for input(s): LABBLOO in the last 48 hours.  BMP:   Recent Labs   Lab 11/20/22  0414   *   *   K 3.8      CO2 22*   BUN 22   CREATININE 3.5*   CALCIUM 7.9*   MG 3.0*     CBC:   Recent Labs   Lab 11/19/22  0408 11/20/22  0414   WBC 9.75 8.63   HGB 7.6* 8.6*   HCT 23.6* 26.6*   PLT 74* 67*     CMP:   Recent Labs   Lab 11/19/22  0408 11/20/22  0414   * 133*   K 3.7 3.8    105   CO2 24 22*   * 156*   BUN 13 22   CREATININE 2.8* 3.5*   CALCIUM 7.8* 7.9*   PROT 4.0* 4.2*   ALBUMIN 1.6* 1.6*   BILITOT 4.4* 4.3*   ALKPHOS 186* 355*   AST 47* 36   ALT 33 27   ANIONGAP 4* 6*     Cardiac Markers: No results for input(s): CKMB, MYOGLOBIN, BNP, TROPISTAT in the last 48 hours.  Lactic Acid:   Recent Labs   Lab 11/19/22  1124   LACTATE 1.2     Magnesium:   Recent Labs   Lab 11/19/22 0408 11/20/22  0414   MG 2.2 3.0*     POCT Glucose: No results for input(s): POCTGLUCOSE in the last 48 hours.  Troponin: No results for input(s): TROPONINI, TROPONINIHS in the last 48 hours.  TSH:   Recent Labs   Lab 11/16/22  1318   TSH 2.820     Urine Culture: No results for input(s): LABURIN in the last 48 hours.  Urine Studies: No results for input(s): COLORU, APPEARANCEUA, PHUR, SPECGRAV, PROTEINUA, GLUCUA, KETONESU, BILIRUBINUA, OCCULTUA, NITRITE, UROBILINOGEN, LEUKOCYTESUR, RBCUA, WBCUA, BACTERIA, SQUAMEPITHEL, HYALINECASTS in the last 48 hours.    Invalid input(s): CATALINA    Significant Imaging: I have reviewed all  pertinent imaging results/findings within the past 24 hours.

## 2022-11-20 NOTE — PLAN OF CARE
Problem: Adult Inpatient Plan of Care  Goal: Plan of Care Review  Outcome: Ongoing, Progressing  Goal: Patient-Specific Goal (Individualized)  Outcome: Ongoing, Progressing  Goal: Absence of Hospital-Acquired Illness or Injury  Outcome: Ongoing, Progressing  Goal: Optimal Comfort and Wellbeing  Outcome: Ongoing, Progressing  Goal: Readiness for Transition of Care  Outcome: Ongoing, Progressing     Problem: Adjustment to Illness (Sepsis/Septic Shock)  Goal: Optimal Coping  Outcome: Ongoing, Progressing     Problem: Bleeding (Sepsis/Septic Shock)  Goal: Absence of Bleeding  Outcome: Ongoing, Progressing     Problem: Glycemic Control Impaired (Sepsis/Septic Shock)  Goal: Blood Glucose Level Within Desired Range  Outcome: Ongoing, Progressing     Problem: Infection Progression (Sepsis/Septic Shock)  Goal: Absence of Infection Signs and Symptoms  Outcome: Ongoing, Progressing     Problem: Nutrition Impaired (Sepsis/Septic Shock)  Goal: Optimal Nutrition Intake  Outcome: Ongoing, Progressing     Problem: Fluid and Electrolyte Imbalance (Acute Kidney Injury/Impairment)  Goal: Fluid and Electrolyte Balance  Outcome: Ongoing, Progressing     Problem: Oral Intake Inadequate (Acute Kidney Injury/Impairment)  Goal: Optimal Nutrition Intake  Outcome: Ongoing, Progressing     Problem: Renal Function Impairment (Acute Kidney Injury/Impairment)  Goal: Effective Renal Function  Outcome: Ongoing, Progressing     Problem: Fluid Imbalance (Pneumonia)  Goal: Fluid Balance  Outcome: Ongoing, Progressing     Problem: Infection (Pneumonia)  Goal: Resolution of Infection Signs and Symptoms  Outcome: Ongoing, Progressing     Problem: Respiratory Compromise (Pneumonia)  Goal: Effective Oxygenation and Ventilation  Outcome: Ongoing, Progressing     Problem: Infection  Goal: Absence of Infection Signs and Symptoms  Outcome: Ongoing, Progressing     Problem: Impaired Wound Healing  Goal: Optimal Wound Healing  Outcome: Ongoing, Progressing      Problem: Fall Injury Risk  Goal: Absence of Fall and Fall-Related Injury  Outcome: Ongoing, Progressing     Problem: Skin Injury Risk Increased  Goal: Skin Health and Integrity  Outcome: Ongoing, Progressing     Problem: Parenteral Nutrition  Goal: Effective Intravenous Nutrition Therapy Delivery  Outcome: Ongoing, Progressing     Problem: Device-Related Complication Risk (Hemodialysis)  Goal: Safe, Effective Therapy Delivery  Outcome: Ongoing, Progressing     Problem: Hemodynamic Instability (Hemodialysis)  Goal: Effective Tissue Perfusion  Outcome: Ongoing, Progressing     Problem: Infection (Hemodialysis)  Goal: Absence of Infection Signs and Symptoms  Outcome: Ongoing, Progressing     Problem: Oral Intake Inadequate  Goal: Improved Oral Intake  Outcome: Ongoing, Progressing     Problem: Adjustment to Surgery (Colostomy)  Goal: Optimal Coping  11/20/2022 1630 by Luba So RN  Outcome: Ongoing, Progressing  11/20/2022 1629 by Luba So RN  Outcome: Ongoing, Progressing     Problem: Bleeding (Colostomy)  Goal: Absence of Bleeding  Outcome: Ongoing, Progressing     Problem: Fluid, Electrolyte and Nutrition Imbalance (Colostomy)  Goal: Fluid, Electrolyte and Nutrition Balance  Outcome: Ongoing, Progressing     Problem: Infection (Colostomy)  Goal: Absence of Infection Signs and Symptoms  Outcome: Ongoing, Progressing     Problem: Ongoing Anesthesia Effects (Colostomy)  Goal: Anesthesia/Sedation Recovery  Outcome: Ongoing, Progressing     Problem: Pain (Colostomy)  Goal: Acceptable Pain Control  Outcome: Ongoing, Progressing     Problem: Postoperative Nausea and Vomiting (Colostomy)  Goal: Nausea and Vomiting Relief  Outcome: Ongoing, Progressing     Problem: Postoperative Stoma Care (Colostomy)  Goal: Optimal Stoma Healing  Outcome: Ongoing, Progressing     Problem: Postoperative Urinary Retention (Colostomy)  Goal: Effective Urinary Elimination  Outcome: Ongoing, Progressing     Problem: Respiratory Compromise  (Colostomy)  Goal: Effective Oxygenation and Ventilation  Outcome: Ongoing, Progressing

## 2022-11-20 NOTE — PT/OT/SLP PROGRESS
Physical Therapy      Patient Name:  Shara Hutchins   MRN:  [id number]    Patient not seen today secondary to Other (Comment), Therapist assessment (RN reported pt is not herself and CT planned. PT attempted eval with pt alert, but not responding to questions and not following commands. Defer eval until after CT results.). Will follow-up 11/21/22.

## 2022-11-20 NOTE — CARE UPDATE
11/19/22 2030   Patient Assessment/Suction   Level of Consciousness (AVPU) responds to voice   Respiratory Effort Normal;Unlabored   Expansion/Accessory Muscles/Retractions no use of accessory muscles   Rhythm/Pattern, Respiratory shallow   Cough Frequency infrequent   PRE-TX-O2   O2 Device (Oxygen Therapy) nasal cannula   $ Is the patient on Low Flow Oxygen? Yes   Flow (L/min) 2   SpO2 97 %   Pulse Oximetry Type Continuous   $ Pulse Oximetry - Multiple Charge Pulse Oximetry - Multiple   Pulse 72   Resp (!) 34   Education   $ Education DME Oxygen;15 min   Respiratory Evaluation   $ Care Plan Tech Time 15 min

## 2022-11-20 NOTE — PLAN OF CARE
Problem: Parenteral Nutrition  Goal: Effective Intravenous Nutrition Therapy Delivery  Outcome: Ongoing, Progressing  Intervention: Optimize Intravenous Nutrition Delivery  Flowsheets (Taken 11/20/2022 1154)  Nutrition Support Management: (New TPN ordered) other (see comments)

## 2022-11-20 NOTE — CONSULTS
[facility]  Adult Nutrition   Consult Note (Nutrition Support Management)    SUMMARY     Recommendations  Recommendation/Intervention: 1. New TPN ordered to be initiated @ 1700. Run @ 50 mL/hr. 2. Blood glucose and electrolytes to be monitored and corrected accordingly. 3. RD to monitor TPN infusion and tolerance. Monitor I/Os. Monitor and mange nutrition support daily.4. Clear liquids to be provided by nursing as tolerated, diet to advance as medically able. 5. Continue Ensure Clear TID (to provide 720 kcal/day and 24 g/day protein)  Goals: 1. Pt to meet estimated needs via TPN provision + PO intake. 2. Patient to tolerate diet.. 3. BG and electrolytes to trend towards normal limits. 4. Transitional feeding from TPN to PO once oral intake is estimated to meet at least 60% of needs.  Nutrition Goal Status: new, progressing towards goal  Communication of RD Recs: reviewed with RN (reviewed with physician)    Dietitian Rounds Brief  TPN to continue. New TPN ordered. Consult for TPN adjustments, RD made adjustments per MD request and clinical judgement. Patient only taking sips of liquids with meds, about 5 sips today. Fluctuating mentation, CT scan today per RN and progress notes.     PARENTERAL NUTRITION PROGRESS NOTE:    Parenteral Nutrition Day # 4  Diagnosis/Indication for PN: S/p total colectomy, malnutrition  IV Access: Percutaneous Central Line  Diet/Tube Feeding: trial clear liquids, ensure clear TID      Admixture Type: 3-in-1  Infusion Rate and Frequency: 50 mL/hr  Patient Acuity: ICU     PN Composition:   66 grams Amino Acid, 196 grams Dextrose, and 50 grams Lipid.    Today's TPN provides 1430 kcal.  *Dextrose is started at less than full dextrose goal to reduce risk for Refeeding Syndrome. Dextrose content will be advanced as appropriate over the next few days.     Please see order for electrolytes and additives content and adjustments.   *The Nutrition Support Team will continue to monitor  "electrolytes daily and adjust parenteral nutrition as warranted.    Reason for Assessment  Reason For Assessment: consult, new TPN, RD follow-up  Diagnosis: other (see comments) (Status post total colectomy)  Relevant Medical History: Vulvar cancer, hypotension, hypothyroidism, anemia of chronic disease, severe malnutrition, ESRD on dialysis MWF    Nutrition Risk Screen  Nutrition Risk Screen: tube feeding or parenteral nutrition     MST Score: 0  Have you recently lost weight without trying?: No  Weight loss score: 0  Have you been eating poorly because of a decreased appetite?: No  Appetite score: 0       Nutrition/Diet History  Food Allergies: NKFA  Factors Affecting Nutritional Intake: impaired cognitive status/motor control, altered gastrointestinal function    Anthropometrics  Temp: 98 °F (36.7 °C)  Height Method: Stated  Height: 5' 7" (170.2 cm)  Height (inches): 67 in  Weight Method: Bed Scale  Weight: 47.2 kg (104 lb 0.9 oz)  Weight (lb): 104.06 lb  Ideal Body Weight (IBW), Female: 135 lb  % Ideal Body Weight, Female (lb): 77.08 %  BMI (Calculated): 16.3  BMI Grade: 16 - 16.9 protein-energy malnutrition grade II       Weight History:  Wt Readings from Last 10 Encounters:   11/17/22 47.2 kg (104 lb 0.9 oz)   11/17/22 47.2 kg (104 lb)   10/29/22 50.8 kg (112 lb)   09/15/22 70.8 kg (156 lb)   09/08/22 70.9 kg (156 lb 3.2 oz)   08/30/22 69.4 kg (153 lb)   08/27/22 65.8 kg (145 lb)   08/22/22 67.6 kg (149 lb)   07/20/22 64.4 kg (142 lb)   07/08/22 67.4 kg (148 lb 11.2 oz)       Lab/Procedures/Meds: Pertinent Labs Reviewed    Clinical Chemistry:  Recent Labs   Lab 11/18/22  0400 11/19/22  0408 11/20/22  0414   * 131* 133*   K 4.0 3.7 3.8   CL 95 103 105   CO2 22* 24 22*   * 157* 156*   BUN 16 13 22   CREATININE 4.4* 2.8* 3.5*   CALCIUM 7.6* 7.8* 7.9*   PROT 4.5* 4.0* 4.2*   ALBUMIN 1.9* 1.6* 1.6*   BILITOT 4.9* 4.4* 4.3*   ALKPHOS 115 186* 355*   AST 36 47* 36   ALT 23 33 27   ANIONGAP 8 4* 6*   MG " 1.7 2.2 3.0*   PHOS 3.5 2.2* 2.0*       CBC:   Recent Labs   Lab 11/20/22  0414   WBC 8.63   RBC 2.71*   HGB 8.6*   HCT 26.6*   PLT 67*   MCV 98   MCH 31.7*   MCHC 32.3       Lipid Panel:  Recent Labs   Lab 11/18/22  0400   TRIG 96       Cardiac Profile:  Recent Labs   Lab 11/16/22  1318   *       Thyroid & Parathyroid:  Recent Labs   Lab 11/16/22  1318   TSH 2.820       Medications: Pertinent Medications reviewed    Scheduled Meds:   cefTRIAXone (ROCEPHIN) IVPB  1 g Intravenous Q24H    chlorhexidine  15 mL Mouth/Throat BID    epoetin nica-ebpx (RETACRIT) injection  100 Units/kg Intravenous Every Mon, Wed, Fri    levothyroxine  88 mcg Oral Before breakfast    metronidazole  500 mg Intravenous Q8H    midodrine  5 mg Oral TID    mupirocin   Nasal BID       Continuous Infusions:   NORepinephrine bitartrate-D5W 0.2 mcg/kg/min (11/19/22 2220)    TPN ADULT CENTRAL LINE CUSTOM (3 in 1) 50 mL/hr at 11/19/22 1858    TPN ADULT CENTRAL LINE CUSTOM (3 in 1)         PRN Meds:.sodium chloride, acetaminophen, calcium gluconate IVPB, calcium gluconate IVPB, calcium gluconate IVPB, dextrose 10%, dextrose 10%, glucagon (human recombinant), glucose, glucose, HYDROcodone-acetaminophen, HYDROmorphone, LIDOcaine HCl 2%, magnesium sulfate IVPB, magnesium sulfate IVPB, naloxone, ondansetron, potassium chloride in water **AND** potassium chloride in water **AND** potassium chloride in water, prochlorperazine, senna-docusate 8.6-50 mg, sodium chloride 0.9%, sodium phosphate IVPB, sodium phosphate IVPB, sodium phosphate IVPB    Estimated/Assessed Needs  Weight Used For Calorie Calculations: 47.2 kg (104 lb 0.9 oz)  Energy Calorie Requirements (kcal): 1746-1475 (35-40)  Energy Need Method: Kcal/kg  Protein Requirements: 57-71 (1.2-1.5 gm/kg)  Weight Used For Protein Calculations: 47.2 kg (104 lb 0.9 oz)  Fluid Requirements (mL): 24 hour UOP + 1000 mL     RDA Method (mL): 1652       Nutrition Prescription Ordered  Current Diet Order:  clear liquid - provided by nursing  Current Nutrition Support Formula Ordered: Other (Comment) (custom 3-in-1 TPN)  Current Nutrition Support Rate Ordered: 50 (ml)  Current Nutrition Support Frequency Ordered: ml/hr  Oral Nutrition Supplement: ensure clear TID    Evaluation of Received Nutrient/Fluid Intake  Parenteral Calories (kcal): 1430  Parenteral Protein (gm): 66  Parenteral Fluid (mL): 1200  Energy Calories Required: not meeting needs  Protein Required: meeting needs  Fluid Required: meeting needs  Tolerance: tolerating     Intake/Output Summary (Last 24 hours) at 11/20/2022 1154  Last data filed at 11/20/2022 1136  Gross per 24 hour   Intake 1073.6 ml   Output 540 ml   Net 533.6 ml      % Intake of Estimated Energy Needs: 75 - 100 %  % Meal Intake: 0 - 25 %    Nutrition Risk  Level of Risk/Frequency of Follow-up: high     Monitor and Evaluation  Food and Nutrient Intake: energy intake, food and beverage intake, parenteral nutrition intake  Food and Nutrient Adminstration: diet order, enteral and parenteral nutrition administration  Knowledge/Beliefs/Attitudes: food and nutrition knowledge/skill  Physical Activity and Function: nutrition-related ADLs and IADLs, factors affecting access to physical activity  Anthropometric Measurements: weight, weight change, body mass index  Biochemical Data, Medical Tests and Procedures: inflammatory profile, electrolyte and renal panel, gastrointestinal profile, lipid profile, glucose/endocrine profile  Nutrition-Focused Physical Findings: overall appearance     Nutrition Follow-Up  RD Follow-up?: Yes    Sandra Mayes RD 11/20/2022 11:52 AM

## 2022-11-20 NOTE — PROGRESS NOTES
[facility] Medicine  Progress Note    Patient Name: Shara Hutchins  MRN: [id number]  Patient Class: IP- Inpatient   Admission Date: 11/16/2022  Length of Stay: 4 days  Attending Physician: Dana Ovalle MD  Primary Care Provider: April Horton MD        Subjective:     Principal Problem:Status post total colectomy        HPI:  Ms. Hutchins is a 72-year-old female who was brought to the ED via EMS from outpatient dialysis center due to hypotension.  Patient with complicated recent history, about 2 months ago, admitted with ischemic colitis status post laparotomy with colostomy, stated she stayed in the hospital for about 1 month, she was discharge but had fall at assisted living facility and re-presented to the hospital following day, since then has had persistent hypotension, antihypertensives were discontinued, she was started on midodrine.  She was subsequently discharged to inpatient rehab and states she had returned to assisted living facility about 2 weeks ago.  Known history of ESRD on HD MWF.  She states blood pressure has been running low since previous admission, yesterday midodrine was increased from 5 mg t.i.d. to 10 mg t.i.d..  States her appetite has been low, over the last 24 hours has noticed decreased output from her colostomy, chronic nausea without any recent change, no significant increased abdominal pain.  States at dialysis today, near end of session, needle got dislodged from fistula, about 300 cc of blood loss, states following this she became lightheaded/dizzy, blood pressure 60/30 and she was referred to the ED.  States with dialysis recently they have not been removing fluid due to hypotension.  States she does have home health at assisted living facility.  In the ED blood pressure was low, 94/56, received fluid bolus, subsequently started on Levophed, during my encounter blood pressure 116/57 on Levophed 0.3 mcg/kg/min.  Patient is currently reporting back pain.   Labs with WBC 11.36, hemoglobin 8.6, platelet 162, potassium 1.9, magnesium 1.2, albumin 2.2, lactic acid 2.2, BUN/creatinine 5/2.4.  EKG reviewed.  Chest x-ray clear.  Previous echo with EF of 70%.  She received acetaminophen, magnesium, potassium, 472 cc fluid bolus as well as cefepime in the ED.  Case discussed with ED provider who requested admission.  Plan of care was discussed with patient, no visitors at bedside.  Address code status and patient confirms DNR.      Overview/Hospital Course:  Patient presented with worsening hypotension from outpatient dialysis center.  Recent complicated past medical history.  She received IV fluid and was started on Levophed.  CTA abdomen and pelvis obtained with diffuse pneumatosis intestinal most prominent ascending, transverse and descending colon with portal vein gas.  She was started on broad-spectrum antibiotics.  General surgery was consulted and underwent emergent exploratory laparotomy with total colectomy, creation of ileostomy, inadvertent bladder injury requiring repair.  She was extubated postprocedure and transferred to the ICU.  Received 1 unit PRBC and FFP.  On 11/17 reports nausea, abdominal pain, still on Levophed.  11/18 still with significant abdominal pain, no output from ileostomy, receiving dialysis today, running even, still on Levophed, resuming home midodrine and attempting to wean off.  On 11/20, received unit of PRBC yesterday, encephalopathy persists, CT head ordered, encephalopathy labs pending, having output from ileostomy and advanced to clear liquid diet.  Urine output from Godwin is now yellow.      Interval History:  Patient continues with encephalopathy today, she did not verbalize during my encounter but appears to be moving extremities spontaneously however not to command.  RN states she did take her oral medications this morning but with much effort.  Levophed has been weaned down in the interval, afebrile with T-max 98.5°, on nasal  cannula.  Received unit of PRBC yesterday.  Labs with hemoglobin 8.6, platelets 67, BUN/creatinine 22/3.5, albumin 1.6.  Documented urine output 420 cc.  Urine output today is clear/yellow.  Discussed with ICU RN.  No visitors at bedside.    Review of Systems   Unable to perform ROS: Mental status change   Objective:     Vital Signs (Most Recent):  Temp: 98 °F (36.7 °C) (11/20/22 0301)  Pulse: 73 (11/20/22 0700)  Resp: 11 (11/20/22 0700)  BP: (!) 182/76 (11/20/22 0700)  SpO2: (!) 93 % (11/20/22 0700)   Vital Signs (24h Range):  Temp:  [96.9 °F (36.1 °C)-98.4 °F (36.9 °C)] 98 °F (36.7 °C)  Pulse:  [59-84] 73  Resp:  [9-42] 11  SpO2:  [82 %-99 %] 93 %  BP: (134-182)/(42-77) 182/76  Arterial Line BP: (113-188)/(33-52) 135/43     Weight: 47.2 kg (104 lb 0.9 oz)  Body mass index is 16.3 kg/m².    Intake/Output Summary (Last 24 hours) at 11/20/2022 0954  Last data filed at 11/20/2022 0505  Gross per 24 hour   Intake 1073.6 ml   Output 360 ml   Net 713.6 ml      Physical Exam  Vitals and nursing note reviewed.   Constitutional:       Comments: Chronically and critically ill appearing, lying in bed, cooperative   HENT:      Head: Normocephalic and atraumatic.      Mouth/Throat:      Mouth: Mucous membranes are moist.   Eyes:      General:         Right eye: No discharge.         Left eye: No discharge.   Neck:      Comments: LIJ in place  Cardiovascular:      Rate and Rhythm: Normal rate and regular rhythm.      Comments: Wearing SCDs  Pulmonary:      Comments: On NC, decreased at bases, no wheeze, no accessory muscle use  Abdominal:      Tenderness: There is abdominal tenderness.      Comments: Scaphoid, ileostomy RLQ, surgical dressing in place, dark green output seen in ileostomy   Genitourinary:     Comments: Godwin in place with minimal bloody urine  Skin:     Comments: Right radial arterial line, LUE AVF. Edema bilateral hands R>L   Neurological:      Mental Status: She is alert.      Comments: Eyes open, appears to be  moving all 4 extremities spontaneously but not to command, did not verbalize during my encounter        Significant Labs: Blood Culture: No results for input(s): LABBLOO in the last 48 hours.  BMP:   Recent Labs   Lab 11/20/22  0414   *   *   K 3.8      CO2 22*   BUN 22   CREATININE 3.5*   CALCIUM 7.9*   MG 3.0*     CBC:   Recent Labs   Lab 11/19/22  0408 11/20/22  0414   WBC 9.75 8.63   HGB 7.6* 8.6*   HCT 23.6* 26.6*   PLT 74* 67*     CMP:   Recent Labs   Lab 11/19/22  0408 11/20/22  0414   * 133*   K 3.7 3.8    105   CO2 24 22*   * 156*   BUN 13 22   CREATININE 2.8* 3.5*   CALCIUM 7.8* 7.9*   PROT 4.0* 4.2*   ALBUMIN 1.6* 1.6*   BILITOT 4.4* 4.3*   ALKPHOS 186* 355*   AST 47* 36   ALT 33 27   ANIONGAP 4* 6*     Cardiac Markers: No results for input(s): CKMB, MYOGLOBIN, BNP, TROPISTAT in the last 48 hours.  Lactic Acid:   Recent Labs   Lab 11/19/22  1124   LACTATE 1.2     Magnesium:   Recent Labs   Lab 11/19/22 0408 11/20/22  0414   MG 2.2 3.0*     POCT Glucose: No results for input(s): POCTGLUCOSE in the last 48 hours.  Troponin: No results for input(s): TROPONINI, TROPONINIHS in the last 48 hours.  TSH:   Recent Labs   Lab 11/16/22  1318   TSH 2.820     Urine Culture: No results for input(s): LABURIN in the last 48 hours.  Urine Studies: No results for input(s): COLORU, APPEARANCEUA, PHUR, SPECGRAV, PROTEINUA, GLUCUA, KETONESU, BILIRUBINUA, OCCULTUA, NITRITE, UROBILINOGEN, LEUKOCYTESUR, RBCUA, WBCUA, BACTERIA, SQUAMEPITHEL, HYALINECASTS in the last 48 hours.    Invalid input(s): WRIGHTSUR    Significant Imaging: I have reviewed all pertinent imaging results/findings within the past 24 hours.      Assessment/Plan:      Active Hospital Problems    Diagnosis    *Status post total colectomy    Encephalopathy, metabolic, fluctuating    Ischemic colitis s/p laprotomy with total colectomy and ileostomy, 11/17    Ischemic colon    Generalized weakness    Bladder injury     Hypotension, acute on chronic    QT prolongation    Thrombocytopenia    S/P ureteral stent placement    ESRD on dialysis MWF    Bilateral LE neuropathy    Severe malnutrition    Hypothyroidism    Anemia of chronic disease, status post 1 unit PRBC 11/19    Hisotry of Vulvar cancer        Plan:  Continue care in ICU with continuous cardiac telemetry monitoring  Postop care as per General surgery   Ileostomy currently has output, placed on clear liquid diet, advanced as per general surgery  Fluctuating mentation continues today, cefepime can cause encephalopathy which was discontinued, check CT head, encephalopathy labs including B1, B6, B12 and folic acid   Continue TPN for nutrition   Anemia acute on chronic, status post 1 unit PRBC 11/19, trending, ADAM with dialysis  Continue Levophed, midodrine 5 mg t.i.d., titrate for map greater than 60   Echocardiogram with EF of 75%   Continue p.r.n. antiemetic and analgesic  Bladder injury intraoperatively, keep Godwin catheter in place for at least 2 weeks  Renally dosing all medications and avoid nephrotoxin drugs  Fall and delirium precautions  A.m. labs ordered  Appreciate all consultant's input   Increase activity, PT/OT following   Seen by Urology, , Godwin catheter in place at least 2 weeks, cystogram prior to removal, will need stent removed in few months, follow-up with Dr. Barber  Further plan as per clinical course    VTE Risk Mitigation (From admission, onward)         Ordered     IP VTE HIGH RISK PATIENT  Once         11/16/22 1845     Place sequential compression device  Until discontinued         11/16/22 1845                Discharge Planning   RODRIGO:      Code Status: DNR   Is the patient medically ready for discharge?:     Reason for patient still in hospital (select all that apply): Patient trending condition  Discharge Plan A: Home Health                  Dana Ovalle MD  Department of Hospital Medicine   [facility]

## 2022-11-20 NOTE — PROGRESS NOTES
INPATIENT NEPHROLOGY Progress Notes  [facility]    Patient Name: Shara Hutchins  Date: 11/20/2022    Reason for consultation: ESRD    Chief Complaint:   Chief Complaint   Patient presents with    Hypotension     Pt presents to the ER via EMS after being hypotensive at dialysis. Pt had systolic BP of 60 at dialysis.        History of Present Illness:  Ms. Hutchins is a 72-year-old female who was brought to the ED via EMS from outpatient dialysis center due to hypotension.  Patient with complicated recent history, about 2 months ago, admitted with ischemic colitis status post laparotomy with colostomy, stated she stayed in the hospital for about 1 month, she was discharge but had fall at assisted living facility and re-presented to the hospital following day, since then has had persistent hypotension, antihypertensives were discontinued, she was started on midodrine.  She was subsequently discharged to inpatient rehab and states she had returned to assisted living facility about 2 weeks ago.  Known history of ESRD on HD MWF.  She states blood pressure has been running low since previous admission, yesterday midodrine was increased from 5 mg t.i.d. to 10 mg t.i.d.. States her appetite has been low, over the last 24 hours has noticed decreased output from her colostomy, chronic nausea without any recent change, no significant increased abdominal pain.  States at dialysis today, near end of session, needle got dislodged from fistula, about 300 cc of blood loss, states following this she became lightheaded/dizzy, blood pressure 60/30 and she was referred to the ED.  States with dialysis recently they have not been removing fluid due to hypotension.  States she does have home health at assisted living facility.  In the ED blood pressure was low, 94/56, received fluid bolus, subsequently started on Levophed, during my encounter blood pressure 116/57 on Levophed 0.3 mcg/kg/min.  Patient is currently reporting  back pain.  Labs with WBC 11.36, hemoglobin 8.6, platelet 162, potassium 1.9, magnesium 1.2, albumin 2.2, lactic acid 2.2, BUN/creatinine 5/2.4.  EKG reviewed.  Chest x-ray clear.  Previous echo with EF of 70%.  She received acetaminophen, magnesium, potassium, 472 cc fluid bolus as well as cefepime in the ED. She was started on Levophed.  CTA abdomen and pelvis obtained with diffuse pneumatosis intestinal most prominent ascending, transverse and descending colon with portal vein gas.  She was started on broad-spectrum antibiotics.  General surgery was consulted and underwent emergent exploratory laparotomy with total colectomy, creation of ileostomy, inadvertent bladder injury requiring repair.  She was extubated postprocedure and transferred to the ICU.  Received 1 unit PRBC and FFP.  On 11/17 reports nausea, abdominal pain, still on Levophed. We are consulted for dialysis.    Interval History:  11/17- weaning pressors, stopped IVFs, can remove lee if ok with urology  11/18- still on levophed- reviewed urology note regarding lee- seen on HD- UF even  11/19- worsening mental status this AM- remains on levophed- weak today  11/20- still with AMS- worse today, off levophed, went for CT head- neg    Plan of Care:    Assessment:  Ischemic colitis s/p laprotomy with total colectomy and ileostomy, 11/17  ESRD on HD MWF  Septic shock  Hyponatremia  Hypokalemia  Lactic acidosis  HyperMg  SHPT  Anemia of CKD  AMS    Plan:    - off pressors- continue lee per urology- dose antbx for CrCl < 10/HD  - continue HD MWF  - continue midodrine  - UF even  - consulted RD for customized TPN- increase K and phos, remove Mg  - adjust dialysate  - BMM reviewed- no binder needs  - transfused 1u on 11/19- Hb is better- continue ADAM with HD  - CT head neg, ammonia and LFTs normal, lactate normal, WBC count improving- will discuss with hospitalist rechecking ua/ucx and thyroid function    Thank you for allowing us to participate in this  patient's care. We will continue to follow.    Vital Signs:  Temp Readings from Last 3 Encounters:   11/20/22 98 °F (36.7 °C) (Axillary)   10/29/22 98.5 °F (36.9 °C) (Oral)   09/27/22 97.4 °F (36.3 °C)       Pulse Readings from Last 3 Encounters:   11/20/22 73   10/29/22 91   09/27/22 74       BP Readings from Last 3 Encounters:   11/20/22 (!) 182/76   10/29/22 (!) 105/58   09/27/22 101/61       Weight:  Wt Readings from Last 3 Encounters:   11/17/22 47.2 kg (104 lb 0.9 oz)   11/17/22 47.2 kg (104 lb)   10/29/22 50.8 kg (112 lb)       Medications:  Scheduled Meds:   cefTRIAXone (ROCEPHIN) IVPB  1 g Intravenous Q24H    chlorhexidine  15 mL Mouth/Throat BID    epoetin nica-ebpx (RETACRIT) injection  100 Units/kg Intravenous Every Mon, Wed, Fri    levothyroxine  88 mcg Oral Before breakfast    metronidazole  500 mg Intravenous Q8H    midodrine  5 mg Oral TID    mupirocin   Nasal BID     Continuous Infusions:   NORepinephrine bitartrate-D5W 0.2 mcg/kg/min (11/19/22 2220)    TPN ADULT CENTRAL LINE CUSTOM (3 in 1) 50 mL/hr at 11/19/22 1858     PRN Meds:.sodium chloride, acetaminophen, calcium gluconate IVPB, calcium gluconate IVPB, calcium gluconate IVPB, dextrose 10%, dextrose 10%, glucagon (human recombinant), glucose, glucose, HYDROcodone-acetaminophen, HYDROmorphone, LIDOcaine HCl 2%, magnesium sulfate IVPB, magnesium sulfate IVPB, naloxone, ondansetron, potassium chloride in water **AND** potassium chloride in water **AND** potassium chloride in water, prochlorperazine, senna-docusate 8.6-50 mg, sodium chloride 0.9%, sodium phosphate IVPB, sodium phosphate IVPB, sodium phosphate IVPB  Current Facility-Administered Medications on File Prior to Encounter   Medication Dose Route Frequency Provider Last Rate Last Admin    electrolyte-S (ISOLYTE)   Intravenous Continuous Raz Ruff MD        lactated ringers infusion   Intravenous Continuous Ruy Beltre MD   New Bag at 11/16/22 9363     Current Outpatient  Medications on File Prior to Encounter   Medication Sig Dispense Refill    cholecalciferol, vitamin D3, (VITAMIN D3) 25 mcg (1,000 unit) capsule Take 1 capsule (1,000 Units total) by mouth once daily. 10 capsule 0    dronabinoL (MARINOL) 2.5 MG capsule Take 2.5 mg by mouth 2 (two) times daily before meals.      ergocalciferol (ERGOCALCIFEROL) 50,000 unit Cap Take 50,000 Units by mouth every 7 days. MONDAYS      ferric citrate (AURYXIA) 210 mg iron Tab Take 2 tablets by mouth 3 (three) times daily with meals.      gabapentin (NEURONTIN) 100 MG capsule Take 100 mg by mouth 2 (two) times daily.      hydrALAZINE (APRESOLINE) 50 MG tablet Take 1 tablet (50 mg total) by mouth every 8 (eight) hours. 270 tablet 0    hydrOXYzine HCL (ATARAX) 25 MG tablet Take 1 tablet (25 mg total) by mouth 3 (three) times daily as needed for Itching.      levothyroxine (SYNTHROID) 88 MCG tablet TAKE 1 TABLET BEFORE BREAKFAST (Patient taking differently: Take 88 mcg by mouth before breakfast. TAKE 1 TABLET BEFORE BREAKFAST) 90 tablet 3    LORazepam (ATIVAN) 0.5 MG tablet Take 1 tablet (0.5 mg total) by mouth every evening. 90 tablet 1    midodrine (PROAMATINE) 5 MG Tab Take 5 mg by mouth 3 (three) times daily.      mirtazapine (REMERON) 15 MG tablet Take 7.5 mg by mouth every evening.      promethazine (PHENERGAN) 25 MG tablet TAKE 1 TABLET (25 MG TOTAL) BY MOUTH 2 (TWO) TIMES DAILY AS NEEDED FOR NAUSEA. 60 tablet 0    rOPINIRole (REQUIP) 0.5 MG tablet Take 0.5 mg by mouth every 8 (eight) hours.      sertraline (ZOLOFT) 50 MG tablet Take 1 tablet (50 mg total) by mouth every evening. Take 50 mg by mouth every evening. 90 tablet 1    simethicone (MYLICON) 80 MG chewable tablet Take 80 mg by mouth every 6 (six) hours as needed.      traMADoL (ULTRAM) 50 mg tablet Take 1 tablet by mouth 4 (four) times daily as needed.      zinc gluconate 50 mg tablet Take 50 mg by mouth once daily.      cholestyramine (QUESTRAN) 4 gram packet Take 4 g by mouth  2 (two) times daily.      diltiaZEM (CARDIZEM CD) 120 MG Cp24 Take 1 capsule (120 mg total) by mouth once daily. 90 capsule 3    ferrous sulfate (FEOSOL) 325 mg (65 mg iron) Tab tablet 325 mg.      oxyCODONE (OXY-IR) 5 mg Cap Take 5 mg by mouth.      oxyCODONE-acetaminophen (PERCOCET) 5-325 mg per tablet Take 1 tablet by mouth every 6 (six) hours as needed for Pain. 12 tablet 0    sevelamer carbonate (RENVELA) 800 mg Tab 800 mg.      zinc sulfate (ZINCATE) 50 mg zinc (220 mg) capsule Take 1 capsule by mouth once daily.      [DISCONTINUED] miconazole (MICOTIN) 2 % cream Apply topically 2 (two) times daily. 28 g 0     Review of Systems:  AMS    Physical Exam:  Constitutional: nad, aao x 0, nonverbal, grunting some  Heart: rrr, no m/r/g, wwp, no edema  Lungs: ctab, no w/r/r/c, no lb  Abdomen: s/nt/nd, +BS      Results:  Lab Results   Component Value Date     (L) 11/20/2022    K 3.8 11/20/2022     11/20/2022    CO2 22 (L) 11/20/2022    BUN 22 11/20/2022    CREATININE 3.5 (H) 11/20/2022    CALCIUM 7.9 (L) 11/20/2022    ANIONGAP 6 (L) 11/20/2022    ESTGFRAFRICA 12.5 (A) 07/20/2022    EGFRNONAA 10.9 (A) 07/20/2022       Lab Results   Component Value Date    CALCIUM 7.9 (L) 11/20/2022    PHOS 2.0 (L) 11/20/2022       Recent Labs   Lab 11/20/22  0414   WBC 8.63   RBC 2.71*   HGB 8.6*   HCT 26.6*   PLT 67*   MCV 98   MCH 31.7*   MCHC 32.3         I have personally reviewed pertinent radiological imaging and reports.    I have spent > 35 minutes providing care for this patient for the above diagnoses. These services have included chart/data/imaging review, evaluation, exam, formulation of plan, , note preparation, and discussions with staff involved in this patient's care.    Yasmin Parker MD MPH  [facility] [address] [address]  [phone] (p)  [phone] (f)

## 2022-11-21 NOTE — PT/OT/SLP PROGRESS
Occupational Therapy      Patient Name:  Shara Hutchins   MRN:  [id number]    Patient not seen today secondary to Off the floor for procedure (dialysis). Will follow-up next scheduled service date.    11/21/2022

## 2022-11-21 NOTE — PROGRESS NOTES
[facility]  Adult Nutrition   Progress Note (Nutrition Support Management)    SUMMARY     Recommendations  Recommendation/Intervention:   1. New TPN ordered to be initiated @ 1700. Run @ 50 mL/hr.   2. Blood glucose and electrolytes to be monitored and corrected accordingly.   3. RD to monitor TPN infusion and tolerance. Monitor I/Os. Monitor and mange nutrition support daily.   4. Clear liquids to be provided by nursing as tolerated, diet to advance as medically able. 5. RD added ONS, Ensure Clear TID (to provide 720 kcal/day and 24 g/day protein)    Goals:   1. Pt to meet estimated needs via TPN provision + PO intake.   2. Patient to tolerate diet.   3. BG and electrolytes to trend towards normal limits.   4. Transitional feeding from TPN to PO once oral intake is estimated to meet at least 60% of needs.  Nutrition Goal Status: progressing towards goal    Dietitian Rounds Brief  TPN to continue. New TPN ordered. Pt due for dialysis today. Remains on CLD. Will continue Ensure clear TID.     PARENTERAL NUTRITION PROGRESS NOTE:    Parenteral Nutrition Day # 5  Diagnosis/Indication for PN: S/p total colectomy, malnutrition  IV Access: Percutaneous Central Line  Diet/Tube Feeding: trial clear liquids, ensure clear TID      Admixture Type: 3-in-1  Infusion Rate and Frequency: 50 mL/hr  Patient Acuity: ICU     PN Composition:   66 grams Amino Acid, 196 grams Dextrose, and 50 grams Lipid.    Today's TPN provides 1430 kcal.  *Dextrose is started at less than full dextrose goal to reduce risk for Refeeding Syndrome. Dextrose content will be advanced as appropriate over the next few days.     Please see order for electrolytes and additives content and adjustments.   *The Nutrition Support Team will continue to monitor electrolytes daily and adjust parenteral nutrition as warranted.    Malnutrition Assessment:  Pt is w/ previous malnutrition d/x. Per chart review, pt weighted 156 lbs on 9/822 and now currently  "weights 104 lbs. Pt has lost 52 lbs within a 2 months time span (33% body wt loss) - severe. RD agrees w/ previous d/x s/t BMI of 16.28 kg/m2, and 52 lbs wt loss (33% body wt loss).      Severe malnutrition related to decreased intake in pt w/ increased needs d/t HD, cancer as evidenced by BMI of 16.28 kg/m2, 52 lbs wt loss (33% body wt loss), and < 75% of estimated energy requirement for > 1 month.      Diet order:   Current Diet Order: clear liquid - provided by nursing   Oral Nutrition Supplement: ensure clear TID  Current Nutrition Support Formula Ordered: Other (Comment) (custom 3-in-1 TPN)  Current Nutrition Support Rate Ordered: 50 (ml)  Current Nutrition Support Frequency Ordered: ml/hr    Evaluation of Received Nutrient/Fluid Intake  Parenteral Calories (kcal): 1423  Parenteral Protein (gm): 65  Parenteral Fluid (mL): 1200  Lipid Calories (kcals): 500 kcals  Energy Calories Required: not meeting needs  Protein Required: not meeting needs  Fluid Required: not meeting needs  Tolerance: tolerating     % Intake of Estimated Energy Needs: 75 - 100 %  % Meal Intake: 0 - 25 %      Intake/Output Summary (Last 24 hours) at 11/21/2022 1048  Last data filed at 11/21/2022 0725  Gross per 24 hour   Intake 2157.87 ml   Output 841 ml   Net 1316.87 ml        Anthropometrics  Temp: 97.9 °F (36.6 °C)  Height Method: Stated  Height: 5' 7" (170.2 cm)  Height (inches): 67 in  Weight Method: Bed Scale  Weight: 47.2 kg (104 lb 0.9 oz)  Weight (lb): 104.06 lb  Ideal Body Weight (IBW), Female: 135 lb  % Ideal Body Weight, Female (lb): 77.08 %  BMI (Calculated): 16.3  BMI Grade: 16 - 16.9 protein-energy malnutrition grade II       Estimated/Assessed Needs  Weight Used For Calorie Calculations: 47.2 kg (104 lb 0.9 oz)  Energy Calorie Requirements (kcal): 6824-4457 (35-40)  Energy Need Method: Kcal/kg  Protein Requirements: 57-71 (1.2-1.5 gm/kg)  Weight Used For Protein Calculations: 47.2 kg (104 lb 0.9 oz)  Fluid Requirements (mL): " 24 hour UOP + 1000 mL     RDA Method (mL): 1652       Reason for Assessment  Reason For Assessment: consult, new TPN, RD follow-up  Diagnosis: other (see comments) (Status post total colectomy)  Relevant Medical History: Vulvar cancer, hypotension, hypothyroidism, anemia of chronic disease, severe malnutrition, ESRD on dialysis MWF    Nutrition/Diet History  Food Allergies: NKFA  Factors Affecting Nutritional Intake: impaired cognitive status/motor control, altered gastrointestinal function, clear liquid diet    Nutrition Risk Screen  Nutrition Risk Screen: tube feeding or parenteral nutrition     MST Score: 0  Have you recently lost weight without trying?: No  Weight loss score: 0  Have you been eating poorly because of a decreased appetite?: No  Appetite score: 0       Weight History:  Wt Readings from Last 5 Encounters:   11/17/22 47.2 kg (104 lb 0.9 oz)   11/17/22 47.2 kg (104 lb)   10/29/22 50.8 kg (112 lb)   09/15/22 70.8 kg (156 lb)   09/08/22 70.9 kg (156 lb 3.2 oz)        Medications:Pertinent Medications Reviewed  Scheduled Meds:   cefTRIAXone (ROCEPHIN) IVPB  1 g Intravenous Q24H    chlorhexidine  15 mL Mouth/Throat BID    epoetin nica-ebpx (RETACRIT) injection  100 Units/kg Intravenous Every Mon, Wed, Fri    levothyroxine  50 mcg Intravenous Q24H    metronidazole  500 mg Intravenous Q8H    midodrine  5 mg Oral TID    mupirocin   Nasal BID     Continuous Infusions:   NORepinephrine bitartrate-D5W 0.08 mcg/kg/min (11/21/22 1012)    TPN ADULT CENTRAL LINE CUSTOM (3 in 1) 50 mL/hr at 11/20/22 1709    TPN ADULT CENTRAL LINE CUSTOM (3 in 1)       PRN Meds:.sodium chloride, acetaminophen, calcium gluconate IVPB, calcium gluconate IVPB, calcium gluconate IVPB, dextrose 10%, dextrose 10%, glucagon (human recombinant), glucose, glucose, HYDROcodone-acetaminophen, HYDROmorphone, LIDOcaine HCl 2%, magnesium sulfate IVPB, magnesium sulfate IVPB, naloxone, ondansetron, potassium chloride in water **AND** potassium  chloride in water **AND** potassium chloride in water, prochlorperazine, senna-docusate 8.6-50 mg, sodium chloride 0.9%, sodium phosphate IVPB, sodium phosphate IVPB, sodium phosphate IVPB    Labs: Pertinent Labs Reviewed  Clinical Chemistry:  Recent Labs   Lab 11/19/22  0408 11/20/22  0414 11/21/22  0409   * 133* 137   K 3.7 3.8 4.0    105 108   CO2 24 22* 22*   * 156* 148*   BUN 13 22 32*   CREATININE 2.8* 3.5* 4.3*   CALCIUM 7.8* 7.9* 8.2*   PROT 4.0* 4.2* 4.1*   ALBUMIN 1.6* 1.6* 1.5*   BILITOT 4.4* 4.3* 3.3*   ALKPHOS 186* 355* 454*   AST 47* 36 27   ALT 33 27 26   ANIONGAP 4* 6* 7*   MG 2.2 3.0* 3.0*   PHOS 2.2* 2.0* 2.4*     CBC:   Recent Labs   Lab 11/21/22  0409   WBC 10.04   RBC 2.74*   HGB 8.7*   HCT 27.0*   PLT 76*   MCV 99*   MCH 31.8*   MCHC 32.2     Lipid Panel:  Recent Labs   Lab 11/18/22  0400   TRIG 96     Cardiac Profile:  Recent Labs   Lab 11/16/22  1318   *     Thyroid & Parathyroid:  Recent Labs   Lab 11/16/22  1318   TSH 2.820       Monitor and Evaluation  Food and Nutrient Intake: energy intake, food and beverage intake, parenteral nutrition intake  Food and Nutrient Adminstration: diet order, enteral and parenteral nutrition administration  Knowledge/Beliefs/Attitudes: food and nutrition knowledge/skill  Physical Activity and Function: nutrition-related ADLs and IADLs, factors affecting access to physical activity  Anthropometric Measurements: weight, weight change, body mass index  Biochemical Data, Medical Tests and Procedures: inflammatory profile, electrolyte and renal panel, gastrointestinal profile, lipid profile, glucose/endocrine profile  Nutrition-Focused Physical Findings: overall appearance     Nutrition Risk  Level of Risk/Frequency of Follow-up: high     Nutrition Follow-Up  RD Follow-up?: Yes      Alesia Rangel RD 11/21/2022 10:48 AM

## 2022-11-21 NOTE — TELEPHONE ENCOUNTER
Called patient. No answer. LMOM informing of appointment scheduled 11/30 for 11am. Callback number given to confirm and/or reschedule appt.

## 2022-11-21 NOTE — PT/OT/SLP PROGRESS
Physical Therapy      Patient Name:  Shara Hutchins   MRN:  [id number]    Patient not seen today secondary to Dialysis, Other (Comment) (attempted am and pm, pt in dialysis both attempts). Will follow-up 11/22/22.

## 2022-11-21 NOTE — CONSULTS
[facility]  Department of Neurology  Neurology Consultation Note        PATIENT NAME: Shara Hutchins  MRN: [id number]  CSN: [id number]      TODAY'S DATE: 11/21/2022  ADMIT DATE: 11/16/2022                            CONSULTING PROVIDER: Anderson Rivera MD  CONSULT REQUESTED BY: Dana Ovalle MD      Reason for consult:  Encephalopathy        History obtained from chart review.    HPI per EMR: Shara Hutchins is a 72 y.o. female who was initially brought to the ED initially on 11/16 via EMS from outpatient dialysis center due to hypotension. .  Recently diagnosed with persistent hypotension for which she was started on midodrine.  Known history of ESRD on HD MWF.  She states blood pressure has been running low since previous admission, yesterday midodrine was increased from 5 mg t.i.d. to 10 mg t.i.d..  States her appetite has been low, over the last 24 hours has noticed decreased output from her colostomy, chronic nausea without any recent change, no significant increased abdominal pain.  States at dialysis today, near end of session, needle got dislodged from fistula, about 300 cc of blood loss, states following this she became lightheaded/dizzy, blood pressure 60/30 and she was referred to the ED.    Admitted to the ICU, received fluid bolus, subsequently started on Levophed    Of note, Patient with complicated recent history, about 2 months ago, admitted with ischemic colitis status post laparotomy with colostomy, stated she stayed in the hospital for about 1 month, she was discharge but had fall at assisted living facility and re-presented to the hospital following day, since then has had persistent hypotension, antihypertensives were discontinued, she was started on midodrine.    CT abdomen and pelvis obtained with diffuse pneumatosis intestinal most prominent ascending, transverse and descending colon with portal vein gas.  She was started on broad-spectrum antibiotics.  General surgery was  consulted and underwent emergent exploratory laparotomy with total colectomy, creation of ileostomy, inadvertent bladder injury requiring repair.  She was extubated postprocedure and transferred to the ICU.  Received 1 unit PRBC and FFP.  On 11/17 reports nausea, abdominal pain, still on Levophed.  11/18 still with significant abdominal pain, no output from ileostomy, receiving dialysis today, running even, still on Levophed, resuming home midodrine and attempting to wean off.  On 11/20, received unit of PRBC, encephalopathy persists, CT head negative encephalopathy labs ordered, having output from ileostomy and advanced to clear liquid diet.  Urine output from Godwin is now yellow.  On 11/21, encephalopathy persists, exam is nonfocal, having output from ileostomy, not safe for oral intake, on TPN.    Neurology consult:  Patient was seen and examined by me.  She is awake, tracking however she does not answer or follow commands.  Apparently patient was in her normal mental status prior to surgery and since her surgery, she has been encephalopathic, not answering questions or following commands.  She moves all her extremities symmetrically antigravity.  Patient is on TPN    PREVIOUS MEDICAL HISTORY:  Past Medical History:   Diagnosis Date    Cellulitis of trunk     Perineum, numerous episodes    Chronic diarrhea 02/10/2020    Short-gut syndrome    End stage renal disease on dialysis 01/07/2018    Fatigue     Hypertension     Hypothyroidism 02/10/2020    Iron deficiency anemia due to chronic blood loss 01/07/2018    Pulmonary hypertension 03/20/2020    Vulvar cancer 01/07/2019     PREVIOUS SURGICAL HISTORY:  Past Surgical History:   Procedure Laterality Date    ABDOMINAL SURGERY      ANGIOGRAPHY OF ARTERIOVENOUS SHUNT N/A 01/19/2022    Procedure: Fistulogram with Possible Intervention;  Surgeon: Raz Maher MD;  Location: [facility] CATH/EP LAB;  Service: Cardiothoracic;  Laterality: N/A;    APPENDECTOMY      AV FISTULA  PLACEMENT Left 2018    BACK SURGERY      BLADDER FULGURATION N/A 06/16/2020    Procedure: FULGURATION, BLADDER;  Surgeon: Damari Barber Jr., MD;  Location: [facility] OR;  Service: Urology;  Laterality: N/A;    BLADDER REPAIR  11/16/2022    Procedure: REPAIR, BLADDER;  Surgeon: Kannan Broderick MD;  Location: [facility] OR;  Service: General;;    carpel tunnel surgery once on each hand      CERVICAL FUSION      x 4    cervix repair      CHOLECYSTECTOMY  2000    COLECTOMY      COLECTOMY, TOTAL  11/16/2022    Procedure: COLECTOMY, TOTAL;  Surgeon: Kannan Broderick MD;  Location: [facility] OR;  Service: General;;    CYSTOSCOPY W/ RETROGRADES  06/16/2020    Procedure: CYSTOSCOPY, WITH RETROGRADE PYELOGRAM;  Surgeon: Damari Barber Jr., MD;  Location: [facility] OR;  Service: Urology;;    CYSTOSCOPY W/ RETROGRADES Right 04/20/2021    Procedure: CYSTOSCOPY, WITH RETROGRADE PYELOGRAM;  Surgeon: Damari Barber Jr., MD;  Location: [facility] OR;  Service: Urology;  Laterality: Right;    CYSTOSCOPY W/ URETERAL STENT PLACEMENT Right 03/16/2021    Procedure: CYSTOSCOPY, WITH URETERAL STENT INSERTION;  Surgeon: Damari Barber Jr., MD;  Location: [facility] OR;  Service: Urology;  Laterality: Right;    CYSTOSCOPY W/ URETERAL STENT PLACEMENT Right 05/18/2021    Procedure: CYSTOSCOPY, WITH URETERAL STENT INSERTION;  Surgeon: Damari Barber Jr., MD;  Location: [facility] OR;  Service: Urology;  Laterality: Right;    CYSTOSCOPY W/ URETERAL STENT PLACEMENT Right 06/22/2022    Procedure: CYSTOSCOPY, WITH URETERAL STENT INSERTION;  Surgeon: Gaby Garcia MD;  Location: [facility] OR;  Service: Urology;  Laterality: Right;  cysto stent exchange    CYSTOSCOPY W/ URETERAL STENT REMOVAL Right 04/20/2021    Procedure: CYSTOSCOPY, WITH URETERAL STENT REMOVAL;  Surgeon: Damari Barber Jr., MD;  Location: [facility] OR;  Service: Urology;  Laterality: Right;    CYSTOSCOPY WITH URETEROSCOPY, RETROGRADE PYELOGRAPHY, AND INSERTION OF STENT Right 03/30/2021    Procedure: CYSTOSCOPY, WITH  RETROGRADE PYELOGRAM AND URETERAL STENT INSERTION;  Surgeon: Damari Barber Jr., MD;  Location: [facility] OR;  Service: Urology;  Laterality: Right;    CYSTOURETEROSCOPY WITH RETROGRADE PYELOGRAPHY AND INSERTION OF STENT INTO URETER Right 01/18/2022    Procedure: CYSTOURETEROSCOPY, WITH RETROGRADE PYELOGRAM AND URETERAL STENT INSERTION;  Surgeon: Damari Barber Jr., MD;  Location: [facility] OR;  Service: Urology;  Laterality: Right;    CYSTOURETEROSCOPY WITH RETROGRADE PYELOGRAPHY AND INSERTION OF STENT INTO URETER Right 8/30/2022    Procedure: CYSTOURETEROSCOPY, WITH RETROGRADE PYELOGRAM AND URETERAL STENT INSERTION;  Surgeon: Damari Barber Jr., MD;  Location: [facility] OR;  Service: Urology;  Laterality: Right;    DIAGNOSTIC LAPAROSCOPY N/A 02/13/2020    Procedure: LAPAROSCOPY, DIAGNOSTIC;  Surgeon: Robbi Lovell III, MD;  Location: [facility] OR;  Service: General;  Laterality: N/A;    HYSTERECTOMY  1980    ILEOSTOMY N/A 02/13/2020    Procedure: CREATION, ILEOSTOMY Loop;  Surgeon: Robbi Lovell III, MD;  Location: [facility] OR;  Service: General;  Laterality: N/A;    ILEOSTOMY CLOSURE N/A 05/18/2020    Procedure: CLOSURE, ILEOSTOMY;  Surgeon: Robbi Lovell III, MD;  Location: [facility] OR;  Service: General;  Laterality: N/A;    LASER LITHOTRIPSY Right 03/30/2021    Procedure: LITHOTRIPSY, USING LASER;  Surgeon: Damari Barber Jr., MD;  Location: [facility] OR;  Service: Urology;  Laterality: Right;    LUMBAR LAMINECTOMY      LYSIS OF ADHESIONS N/A 02/13/2020    Procedure: LYSIS, ADHESIONS;  Surgeon: Robbi Lovell III, MD;  Location: [facility] OR;  Service: General;  Laterality: N/A;    NECK SURGERY      OSTEOTOMY  9/7/2022    Procedure: OSTEOTOMY;  Surgeon: Brigette Chavez MD;  Location: [facility] OR;  Service: General;;    PERCUTANEOUS TRANSLUMINAL ANGIOPLASTY OF ARTERIOVENOUS FISTULA N/A 01/19/2022    Procedure: PTA, AV FISTULA;  Surgeon: Raz Maher MD;  Location: [facility] CATH/EP LAB;  Service: Cardiothoracic;  Laterality: N/A;     PHLEBOGRAPHY  02/28/2020    Procedure: VENOGRAM;  Surgeon: Robbi Lovell III, MD;  Location: [facility] OR;  Service: General;;    REMOVAL OF VASCULAR ACCESS PORT Right 02/13/2020    Procedure: REMOVAL, VASCULAR ACCESS PORT;  Surgeon: Robbi Lovell III, MD;  Location: [facility] OR;  Service: General;  Laterality: Right;    RETROGRADE PYELOGRAPHY  03/16/2021    Procedure: PYELOGRAM, RETROGRADE;  Surgeon: Damari Barber Jr., MD;  Location: [facility] OR;  Service: Urology;;    RETROGRADE PYELOGRAPHY Right 05/18/2021    Procedure: PYELOGRAM, RETROGRADE;  Surgeon: Damari Barber Jr., MD;  Location: [facility] OR;  Service: Urology;  Laterality: Right;    RETROGRADE PYELOGRAPHY  06/22/2022    Procedure: PYELOGRAM, RETROGRADE;  Surgeon: Gaby Garcia MD;  Location: [facility] OR;  Service: Urology;;    SHOULDER SURGERY      TONSILLECTOMY      URETEROSCOPIC REMOVAL OF URETERIC CALCULUS Right 03/30/2021    Procedure: REMOVAL, CALCULUS, URETER, URETEROSCOPIC;  Surgeon: Damari Barber Jr., MD;  Location: [facility] OR;  Service: Urology;  Laterality: Right;    URETEROSCOPIC REMOVAL OF URETERIC CALCULUS Right 04/20/2021    Procedure: REMOVAL, CALCULUS, URETER, URETEROSCOPIC;  Surgeon: Damari Barber Jr., MD;  Location: [facility] OR;  Service: Urology;  Laterality: Right;    URETEROSCOPIC REMOVAL OF URETERIC CALCULUS Right 8/30/2022    Procedure: REMOVAL, CALCULUS, URETER, URETEROSCOPIC;  Surgeon: Damari Barber Jr., MD;  Location: [facility];  Service: Urology;  Laterality: Right;    vulva cancer       FAMILY MEDICAL HISTORY:  Family History   Problem Relation Age of Onset    Heart disease Mother     Hypertension Mother     Cancer Mother         lung    Heart disease Father     Heart disease Sister         Open heart surgery    No Known Problems Daughter      SOCIAL HISTORY:  Social History     Tobacco Use    Smoking status: Former     Packs/day: 1.00     Years: 33.00     Pack years: 33.00     Types: Cigarettes     Quit date: 1/1/2000     Years  since quittin.9    Smokeless tobacco: Former     Quit date:    Substance Use Topics    Alcohol use: No    Drug use: No     ALLERGIES:  Review of patient's allergies indicates:   Allergen Reactions    Ciprofloxacin Other (See Comments)     Elevated liver enzymes      Pcn [penicillins] Anaphylaxis     TOLERATES ROCEPHIN WITHOUT DIFFICULTY     HOME MEDICATIONS:  Prior to Admission medications    Medication Sig Start Date End Date Taking? Authorizing Provider   cholecalciferol, vitamin D3, (VITAMIN D3) 25 mcg (1,000 unit) capsule Take 1 capsule (1,000 Units total) by mouth once daily. 22  Yes Christiane Brice MD   dronabinoL (MARINOL) 2.5 MG capsule Take 2.5 mg by mouth 2 (two) times daily before meals. 10/26/22  Yes Historical Provider   ergocalciferol (ERGOCALCIFEROL) 50,000 unit Cap Take 50,000 Units by mouth every 7 days. MONDAYS 10/26/22  Yes Historical Provider   ferric citrate (AURYXIA) 210 mg iron Tab Take 2 tablets by mouth 3 (three) times daily with meals.   Yes Historical Provider   gabapentin (NEURONTIN) 100 MG capsule Take 100 mg by mouth 2 (two) times daily.   Yes Historical Provider   hydrALAZINE (APRESOLINE) 50 MG tablet Take 1 tablet (50 mg total) by mouth every 8 (eight) hours. 22 Yes April Horton MD   hydrOXYzine HCL (ATARAX) 25 MG tablet Take 1 tablet (25 mg total) by mouth 3 (three) times daily as needed for Itching. 22  Yes Ashwin Diaz MD   levothyroxine (SYNTHROID) 88 MCG tablet TAKE 1 TABLET BEFORE BREAKFAST  Patient taking differently: Take 88 mcg by mouth before breakfast. TAKE 1 TABLET BEFORE BREAKFAST 22  Yes April Horton MD   LORazepam (ATIVAN) 0.5 MG tablet Take 1 tablet (0.5 mg total) by mouth every evening. 22 Yes April Horton MD   midodrine (PROAMATINE) 5 MG Tab Take 5 mg by mouth 3 (three) times daily. 11/15/22  Yes Historical Provider   mirtazapine (REMERON) 15 MG tablet Take 7.5 mg by mouth every evening. 10/26/22  Yes  Historical Provider   promethazine (PHENERGAN) 25 MG tablet TAKE 1 TABLET (25 MG TOTAL) BY MOUTH 2 (TWO) TIMES DAILY AS NEEDED FOR NAUSEA. 8/29/22  Yes April Horton MD   rOPINIRole (REQUIP) 0.5 MG tablet Take 0.5 mg by mouth every 8 (eight) hours. 6/13/22  Yes Historical Provider   sertraline (ZOLOFT) 50 MG tablet Take 1 tablet (50 mg total) by mouth every evening. Take 50 mg by mouth every evening. 6/14/22  Yes April Horton MD   simethicone (MYLICON) 80 MG chewable tablet Take 80 mg by mouth every 6 (six) hours as needed. 10/26/22  Yes Historical Provider   traMADoL (ULTRAM) 50 mg tablet Take 1 tablet by mouth 4 (four) times daily as needed. 10/26/22  Yes Historical Provider   zinc gluconate 50 mg tablet Take 50 mg by mouth once daily.   Yes Historical Provider   cholestyramine (QUESTRAN) 4 gram packet Take 4 g by mouth 2 (two) times daily. 10/26/22   Historical Provider   diltiaZEM (CARDIZEM CD) 120 MG Cp24 Take 1 capsule (120 mg total) by mouth once daily. 6/14/22   April Horton MD   ferrous sulfate (FEOSOL) 325 mg (65 mg iron) Tab tablet 325 mg. 10/26/22   Historical Provider   oxyCODONE (OXY-IR) 5 mg Cap Take 5 mg by mouth. 9/15/22   Historical Provider   oxyCODONE-acetaminophen (PERCOCET) 5-325 mg per tablet Take 1 tablet by mouth every 6 (six) hours as needed for Pain. 10/29/22   Leena Devi NP   sevelamer carbonate (RENVELA) 800 mg Tab 800 mg. 10/26/22   Historical Provider   zinc sulfate (ZINCATE) 50 mg zinc (220 mg) capsule Take 1 capsule by mouth once daily. 10/26/22   Historical Provider   miconazole (MICOTIN) 2 % cream Apply topically 2 (two) times daily. 1/26/22 4/30/22  Shemar Hernandez MD     CURRENT SCHEDULED MEDICATIONS:   cefTRIAXone (ROCEPHIN) IVPB  1 g Intravenous Q24H    chlorhexidine  15 mL Mouth/Throat BID    epoetin nica-ebpx (RETACRIT) injection  100 Units/kg Intravenous Every Mon, Wed, Fri    levothyroxine  50 mcg Intravenous Q24H    metronidazole  500 mg Intravenous Q8H     midodrine  5 mg Oral TID    mupirocin   Nasal BID     CURRENT INFUSIONS:   NORepinephrine bitartrate-D5W 0.11 mcg/kg/min (11/21/22 0826)    TPN ADULT CENTRAL LINE CUSTOM (3 in 1) 50 mL/hr at 11/20/22 1709     CURRENT PRN MEDICATIONS:  sodium chloride, acetaminophen, calcium gluconate IVPB, calcium gluconate IVPB, calcium gluconate IVPB, dextrose 10%, dextrose 10%, glucagon (human recombinant), glucose, glucose, HYDROcodone-acetaminophen, HYDROmorphone, LIDOcaine HCl 2%, magnesium sulfate IVPB, magnesium sulfate IVPB, naloxone, ondansetron, potassium chloride in water **AND** potassium chloride in water **AND** potassium chloride in water, prochlorperazine, senna-docusate 8.6-50 mg, sodium chloride 0.9%, sodium phosphate IVPB, sodium phosphate IVPB, sodium phosphate IVPB    REVIEW OF SYSTEMS:  A review of systems cannot be obtained as the patient is unable to respond to questions appropriately.       PHYSICAL EXAM:  Patient Vitals for the past 24 hrs:   BP Temp Temp src Pulse Resp SpO2   11/21/22 0900 (!) 149/48 -- -- 69 15 (!) 93 %   11/21/22 0800 (!) 102/34 -- -- 62 13 (!) 94 %   11/21/22 0700 (!) 146/49 -- -- 79 (!) 25 96 %   11/21/22 0645 -- -- -- 64 12 96 %   11/21/22 0630 -- -- -- 66 14 95 %   11/21/22 0615 -- -- -- 72 15 (!) 94 %   11/21/22 0600 -- -- -- 72 16 96 %   11/21/22 0545 -- -- -- 72 14 (!) 94 %   11/21/22 0530 -- -- -- 77 16 (!) 94 %   11/21/22 0515 -- -- -- 78 15 (!) 94 %   11/21/22 0501 -- -- -- 85 16 (!) 94 %   11/21/22 0500 -- -- -- 86 19 (!) 94 %   11/21/22 0445 -- -- -- 81 (!) 32 (!) 91 %   11/21/22 0430 -- -- -- 77 (!) 23 (!) 93 %   11/21/22 0415 -- -- -- 75 (!) 30 (!) 92 %   11/21/22 0400 -- -- -- 74 (!) 32 (!) 91 %   11/21/22 0345 -- -- -- 81 18 (!) 93 %   11/21/22 0330 -- -- -- 73 17 (!) 93 %   11/21/22 0315 -- -- -- 74 (!) 35 (!) 93 %   11/21/22 0301 -- 97.9 °F (36.6 °C) Axillary 77 (!) 34 (!) 93 %   11/21/22 0245 -- -- -- 76 (!) 29 (!) 93 %   11/21/22 0230 -- -- -- 79 (!) 33 (!) 90 %    11/21/22 0215 -- -- -- 75 15 (!) 93 %   11/21/22 0200 -- -- -- 76 (!) 34 (!) 92 %   11/21/22 0145 -- -- -- 75 (!) 27 (!) 93 %   11/21/22 0130 -- -- -- 79 (!) 28 (!) 94 %   11/21/22 0115 -- -- -- 62 11 (!) 94 %   11/21/22 0101 -- -- -- 77 (!) 28 (!) 91 %   11/21/22 0100 -- -- -- 78 (!) 28 (!) 91 %   11/21/22 0045 -- -- -- 77 (!) 26 (!) 91 %   11/21/22 0030 -- -- -- 77 (!) 26 (!) 93 %   11/21/22 0015 -- -- -- (!) 59 10 95 %   11/21/22 0000 -- -- -- 66 13 (!) 93 %   11/20/22 2345 -- -- -- 73 16 (!) 93 %   11/20/22 2330 -- -- -- 77 13 (!) 94 %   11/20/22 2315 -- -- -- 80 20 (!) 93 %   11/20/22 2300 -- 97 °F (36.1 °C) Axillary 72 12 (!) 93 %   11/20/22 2245 -- -- -- 78 15 (!) 93 %   11/20/22 2230 -- -- -- 74 13 (!) 92 %   11/20/22 2215 -- -- -- 73 20 95 %   11/20/22 2200 -- -- -- (!) 56 (!) 9 99 %   11/20/22 2145 -- -- -- (!) 53 (!) 9 97 %   11/20/22 2140 -- -- -- (!) 59 (!) 9 97 %   11/20/22 2130 -- -- -- 71 (!) 23 96 %   11/20/22 2115 -- -- -- 74 (!) 21 95 %   11/20/22 2101 -- -- -- 70 11 95 %   11/20/22 2045 -- -- -- 75 13 95 %   11/20/22 2030 -- -- -- 73 17 95 %   11/20/22 2015 -- -- -- 71 10 96 %   11/20/22 2000 -- -- -- 74 12 96 %   11/20/22 1945 -- -- -- 74 15 97 %   11/20/22 1930 -- -- -- 72 (!) 9 (!) 94 %   11/20/22 1915 -- -- -- (!) 59 (!) 9 95 %   11/20/22 1901 (!) 102/44 98.3 °F (36.8 °C) Axillary 72 11 (!) 94 %   11/20/22 1730 -- -- -- 71 10 96 %   11/20/22 1600 (!) 82/36 -- -- (!) 58 (!) 9 97 %   11/20/22 1515 -- 98.2 °F (36.8 °C) Oral 71 16 96 %   11/20/22 1500 (!) 105/51 -- -- 70 12 96 %   11/20/22 1400 -- -- -- 69 11 97 %   11/20/22 1300 -- -- -- 67 11 (!) 92 %   11/20/22 1200 (!) 83/50 97.9 °F (36.6 °C) Oral 65 10 95 %   11/20/22 1100 (!) 102/46 -- -- 67 10 (!) 91 %       GENERAL APPEARANCE: Alert, well-developed, well-nourished female in no acute distress.  HEENT: Normocephalic and atraumatic. PERRL. Oropharynx unremarkable.  PULM: Normal respiratory effort. No accessory muscle use.  CV:  RRR.  ABDOMEN: Soft, nontender.  EXTREMITIES: No obvious signs of vascular compromise. Pulses present. No cyanosis, clubbing or edema.  SKIN: Clear; no rashes, lesions or skin breaks in exposed areas.    NEURO:  MENTAL STATUS:  Patient is alert, able to track however not following commands or answering questions.  She does not talk however she moves all extremities symmetrically antigravity. .  Affect labile.    CRANIAL NERVES:  Pupils equal round reactive to light, face is grossly symmetric.  Rest of cranial nerves can not be assessed due to mental status.  MOTOR:  Bulk normal. Tone normal and symmetric throughout.  Abnormal movements absent.  Tremor: none present.  Strength  antigravity strength in all extremities which is symmetrical.  Can not assess exact strength due to mental status .    REFLEXES:  DTRs 1+ throughout.  Plantar response equivocal bilaterally.  SENSATION: not tested.  COORDINATION:  Not tested due to mental status .  STATION: not tested.  GAIT: not tested.    Labs:  Recent Labs   Lab 11/19/22 0408 11/20/22  0414 11/21/22  0409   * 133* 137   K 3.7 3.8 4.0    105 108   CO2 24 22* 22*   BUN 13 22 32*   CREATININE 2.8* 3.5* 4.3*   * 156* 148*   CALCIUM 7.8* 7.9* 8.2*   PHOS 2.2* 2.0* 2.4*   MG 2.2 3.0* 3.0*     Recent Labs   Lab 11/19/22  0408 11/20/22  0414 11/21/22  0409   WBC 9.75 8.63 10.04   HGB 7.6* 8.6* 8.7*   HCT 23.6* 26.6* 27.0*   PLT 74* 67* 76*     Recent Labs   Lab 11/19/22  0408 11/20/22  0414 11/21/22  0409   ALBUMIN 1.6* 1.6* 1.5*   PROT 4.0* 4.2* 4.1*   BILITOT 4.4* 4.3* 3.3*   ALKPHOS 186* 355* 454*   ALT 33 27 26   AST 47* 36 27     Lab Results   Component Value Date    INR 2.0 11/16/2022     Lab Results   Component Value Date    TRIG 96 11/18/2022    HDL 57 05/25/2021    CHOLHDL 60.0 (H) 05/25/2021     Lab Results   Component Value Date    HGBA1C 4.6 05/25/2021     No results found for: PROTEINCSF, GLUCCSF, WBCCSF    Imaging:  I have reviewed and interpreted  the pertinent imaging and lab results.      X-Ray Abdomen AP 1 View  Reason: abdominal surgery abdominal surgery    FINDINGS:  Supine abdomen compared with 11/16/2022 CT shows nonspecific lucency in the upper abdomen which could be artifactual in nature or represent expected postoperative free intraperitoneal gas. Bowel gas pattern is nonobstructive. Right upper quadrant surgical clips indicate cholecystectomy. Right ureteral stent remains in place. Bilateral medullary nephrocalcinosis is more conspicuous in the left kidney, unchanged. Diffuse arterial vascular calcifications are present. Godwin catheter tip projects in pelvis.    Partially visualized lung bases are clear. Convex left lumbar spine curvature is evident.    IMPRESSION:  Nonobstructive bowel gas pattern with nonspecific lucency in upper abdomen as discussed above, and additional findings which are unchanged.    Electronically signed by:  Wellington Epperson MD  11/21/2022 9:30 AM CST Workstation: [id number]  X-Ray Chest AP Portable  Reason: AMS    FINDINGS:    Portable chest comparison chest x-ray November 17, 2022 show borderline enlarged cardiomediastinal silhouette. Left internal jugular central venous catheter is noted.  There is interval development of bilateral mixed interstitial and airspace lung opacities, worst in the left upper lung. There is prominence of the central hilar vascular structures. No acute osseous abnormality.    IMPRESSION:    Borderline enlarged cardiomediastinal silhouette with prominence of the central vascular structures interval development of bilateral mixed interstitial and airspace lung opacities, worst in the left lung. Findings could reflect CHF with pulmonary edema. Correlate.    Electronically signed by:  Elias Sherwood DO  11/21/2022 9:27 AM CST Workstation: [id number]         ASSESSMENT & PLAN:    Encephalopathy  Ischemic Colitis       Plan:   Etiology of encephalopathy is unknown and could likely be  infectious/metabolic encephalopathy. Concern for primary neurological cause for encephalopathy is less likely. Cannot rule out a catatonic state.   Check reversible causes of encephalopathy including including Vitamin b12(normal), folate (normal), normal Ammonia, normal TSH.   EEG ordered and pending   CTH: No acute intracranial abnormality.  Neuro checks per unit protocol   Cannot start haldol due to prolonged QT. Will start Depacon 250mg TID for mood.   Management of metabolic and infectious abnormalities per primary team.   If no improvement in mental status consider MRI brain  Consider psychiatry evaluation  Will follow         Thank you kindly for including us in the care of this patient. Please do not hesitate to contact us with any questions.      52 minutes of care time has been spent evaluating with the patient. Time includes chart review not limited to diagnostic imaging, labs, and vitals, patient assessment, discussion with family and nursing, current order evaluations, and new order entries.       Anderson Rivera MD  Neurology/vascular Neurology  Date of Service: 11/21/2022  10:07 AM    --------------------------------------------------------------------------------------------------------------------------------------------------------------------------------------------------------------------------------------------------------------  Please note: This note was transcribed using voice recognition software. Because of this technology there are often uinintended grammatical, spelling, and other transcription errors. Please disregard these errors.

## 2022-11-21 NOTE — PROGRESS NOTES
INPATIENT NEPHROLOGY Progress Notes  [facility]    Patient Name: Shara Hutchins  Date: 11/21/2022    Reason for consultation: ESRD    Chief Complaint:   Chief Complaint   Patient presents with    Hypotension     Pt presents to the ER via EMS after being hypotensive at dialysis. Pt had systolic BP of 60 at dialysis.        History of Present Illness:  Ms. Hutchnis is a 72-year-old female who was brought to the ED via EMS from outpatient dialysis center due to hypotension.  Patient with complicated recent history, about 2 months ago, admitted with ischemic colitis status post laparotomy with colostomy, stated she stayed in the hospital for about 1 month, she was discharge but had fall at assisted living facility and re-presented to the hospital following day, since then has had persistent hypotension, antihypertensives were discontinued, she was started on midodrine.  She was subsequently discharged to inpatient rehab and states she had returned to assisted living facility about 2 weeks ago.  Known history of ESRD on HD MWF.  She states blood pressure has been running low since previous admission, yesterday midodrine was increased from 5 mg t.i.d. to 10 mg t.i.d.. States her appetite has been low, over the last 24 hours has noticed decreased output from her colostomy, chronic nausea without any recent change, no significant increased abdominal pain.  States at dialysis today, near end of session, needle got dislodged from fistula, about 300 cc of blood loss, states following this she became lightheaded/dizzy, blood pressure 60/30 and she was referred to the ED.  States with dialysis recently they have not been removing fluid due to hypotension.  States she does have home health at assisted living facility.  In the ED blood pressure was low, 94/56, received fluid bolus, subsequently started on Levophed, during my encounter blood pressure 116/57 on Levophed 0.3 mcg/kg/min.  Patient is currently reporting  back pain.  Labs with WBC 11.36, hemoglobin 8.6, platelet 162, potassium 1.9, magnesium 1.2, albumin 2.2, lactic acid 2.2, BUN/creatinine 5/2.4.  EKG reviewed.  Chest x-ray clear.  Previous echo with EF of 70%.  She received acetaminophen, magnesium, potassium, 472 cc fluid bolus as well as cefepime in the ED. She was started on Levophed.  CTA abdomen and pelvis obtained with diffuse pneumatosis intestinal most prominent ascending, transverse and descending colon with portal vein gas.  She was started on broad-spectrum antibiotics.  General surgery was consulted and underwent emergent exploratory laparotomy with total colectomy, creation of ileostomy, inadvertent bladder injury requiring repair.  She was extubated postprocedure and transferred to the ICU.  Received 1 unit PRBC and FFP.  On 11/17 reports nausea, abdominal pain, still on Levophed. We are consulted for dialysis.    Interval History:  11/17- weaning pressors, stopped IVFs, can remove lee if ok with urology  11/18- still on levophed- reviewed urology note regarding lee- seen on HD- UF even  11/19- worsening mental status this AM- remains on levophed- weak today  11/20- still with AMS- worse today, off levophed, went for CT head- neg  11/21  encephalopathic.  AFVSS    Plan of Care:    Assessment:  Ischemic colitis s/p laprotomy with total colectomy and ileostomy, 11/17  ESRD on HD MWF  Septic shock  Hyponatremia--better  Hypokalemia--better  Lactic acidosis--resoved  HyperMg  SHPT  Anemia of CKD  AMS    Plan:    - off pressors- continue lee per urology- dose antbx for CrCl < 10/HD  - continue HD MWF  - continue midodrine  - UF even  - consulted RD for customized TPN- increase K and phos, remove Mg  - adjust dialysate  - BMM reviewed- no binder needs  - transfused 1u on 11/19- Hb is better- continue ADAM with HD  - CT head neg, ammonia and LFTs normal, lactate normal, WBC count improving-        Thank you for allowing us to participate in this  patient's care. We will continue to follow.    Vital Signs:  Temp Readings from Last 3 Encounters:   11/21/22 97.9 °F (36.6 °C) (Axillary)   10/29/22 98.5 °F (36.9 °C) (Oral)   09/27/22 97.4 °F (36.3 °C)       Pulse Readings from Last 3 Encounters:   11/21/22 62   10/29/22 91   09/27/22 74       BP Readings from Last 3 Encounters:   11/20/22 (!) 102/44   10/29/22 (!) 105/58   09/27/22 101/61       Weight:  Wt Readings from Last 3 Encounters:   11/17/22 47.2 kg (104 lb 0.9 oz)   11/17/22 47.2 kg (104 lb)   10/29/22 50.8 kg (112 lb)       Medications:  Scheduled Meds:   cefTRIAXone (ROCEPHIN) IVPB  1 g Intravenous Q24H    chlorhexidine  15 mL Mouth/Throat BID    epoetin nica-ebpx (RETACRIT) injection  100 Units/kg Intravenous Every Mon, Wed, Fri    levothyroxine  50 mcg Intravenous Q24H    metronidazole  500 mg Intravenous Q8H    midodrine  5 mg Oral TID    mupirocin   Nasal BID     Continuous Infusions:   NORepinephrine bitartrate-D5W 0.11 mcg/kg/min (11/21/22 0826)    TPN ADULT CENTRAL LINE CUSTOM (3 in 1) 50 mL/hr at 11/20/22 1709     PRN Meds:.sodium chloride, acetaminophen, calcium gluconate IVPB, calcium gluconate IVPB, calcium gluconate IVPB, dextrose 10%, dextrose 10%, glucagon (human recombinant), glucose, glucose, HYDROcodone-acetaminophen, HYDROmorphone, LIDOcaine HCl 2%, magnesium sulfate IVPB, magnesium sulfate IVPB, naloxone, ondansetron, potassium chloride in water **AND** potassium chloride in water **AND** potassium chloride in water, prochlorperazine, senna-docusate 8.6-50 mg, sodium chloride 0.9%, sodium phosphate IVPB, sodium phosphate IVPB, sodium phosphate IVPB  Current Facility-Administered Medications on File Prior to Encounter   Medication Dose Route Frequency Provider Last Rate Last Admin    electrolyte-S (ISOLYTE)   Intravenous Continuous Raz Ruff MD        lactated ringers infusion   Intravenous Continuous Ruy Beltre MD   New Bag at 11/16/22 2601     Current Outpatient  Medications on File Prior to Encounter   Medication Sig Dispense Refill    cholecalciferol, vitamin D3, (VITAMIN D3) 25 mcg (1,000 unit) capsule Take 1 capsule (1,000 Units total) by mouth once daily. 10 capsule 0    dronabinoL (MARINOL) 2.5 MG capsule Take 2.5 mg by mouth 2 (two) times daily before meals.      ergocalciferol (ERGOCALCIFEROL) 50,000 unit Cap Take 50,000 Units by mouth every 7 days. MONDAYS      ferric citrate (AURYXIA) 210 mg iron Tab Take 2 tablets by mouth 3 (three) times daily with meals.      gabapentin (NEURONTIN) 100 MG capsule Take 100 mg by mouth 2 (two) times daily.      hydrALAZINE (APRESOLINE) 50 MG tablet Take 1 tablet (50 mg total) by mouth every 8 (eight) hours. 270 tablet 0    hydrOXYzine HCL (ATARAX) 25 MG tablet Take 1 tablet (25 mg total) by mouth 3 (three) times daily as needed for Itching.      levothyroxine (SYNTHROID) 88 MCG tablet TAKE 1 TABLET BEFORE BREAKFAST (Patient taking differently: Take 88 mcg by mouth before breakfast. TAKE 1 TABLET BEFORE BREAKFAST) 90 tablet 3    LORazepam (ATIVAN) 0.5 MG tablet Take 1 tablet (0.5 mg total) by mouth every evening. 90 tablet 1    midodrine (PROAMATINE) 5 MG Tab Take 5 mg by mouth 3 (three) times daily.      mirtazapine (REMERON) 15 MG tablet Take 7.5 mg by mouth every evening.      promethazine (PHENERGAN) 25 MG tablet TAKE 1 TABLET (25 MG TOTAL) BY MOUTH 2 (TWO) TIMES DAILY AS NEEDED FOR NAUSEA. 60 tablet 0    rOPINIRole (REQUIP) 0.5 MG tablet Take 0.5 mg by mouth every 8 (eight) hours.      sertraline (ZOLOFT) 50 MG tablet Take 1 tablet (50 mg total) by mouth every evening. Take 50 mg by mouth every evening. 90 tablet 1    simethicone (MYLICON) 80 MG chewable tablet Take 80 mg by mouth every 6 (six) hours as needed.      traMADoL (ULTRAM) 50 mg tablet Take 1 tablet by mouth 4 (four) times daily as needed.      zinc gluconate 50 mg tablet Take 50 mg by mouth once daily.      cholestyramine (QUESTRAN) 4 gram packet Take 4 g by mouth  2 (two) times daily.      diltiaZEM (CARDIZEM CD) 120 MG Cp24 Take 1 capsule (120 mg total) by mouth once daily. 90 capsule 3    ferrous sulfate (FEOSOL) 325 mg (65 mg iron) Tab tablet 325 mg.      oxyCODONE (OXY-IR) 5 mg Cap Take 5 mg by mouth.      oxyCODONE-acetaminophen (PERCOCET) 5-325 mg per tablet Take 1 tablet by mouth every 6 (six) hours as needed for Pain. 12 tablet 0    sevelamer carbonate (RENVELA) 800 mg Tab 800 mg.      zinc sulfate (ZINCATE) 50 mg zinc (220 mg) capsule Take 1 capsule by mouth once daily.      [DISCONTINUED] miconazole (MICOTIN) 2 % cream Apply topically 2 (two) times daily. 28 g 0     Review of Systems:  AMS    Physical Exam:  Constitutional: nad, aao x 0, nonverbal, grunting some  Heart: rrr, no m/r/g, wwp, no edema  Lungs: ctab, no w/r/r/c, no lb  Abdomen: s/nt/nd, +BS      Results:  Lab Results   Component Value Date     11/21/2022    K 4.0 11/21/2022     11/21/2022    CO2 22 (L) 11/21/2022    BUN 32 (H) 11/21/2022    CREATININE 4.3 (H) 11/21/2022    CALCIUM 8.2 (L) 11/21/2022    ANIONGAP 7 (L) 11/21/2022    ESTGFRAFRICA 12.5 (A) 07/20/2022    EGFRNONAA 10.9 (A) 07/20/2022       Lab Results   Component Value Date    CALCIUM 8.2 (L) 11/21/2022    PHOS 2.4 (L) 11/21/2022       Recent Labs   Lab 11/21/22  0409   WBC 10.04   RBC 2.74*   HGB 8.7*   HCT 27.0*   PLT 76*   MCV 99*   MCH 31.8*   MCHC 32.2         I have personally reviewed pertinent radiological imaging and reports.    I have spent > 35 minutes providing care for this patient for the above diagnoses. These services have included chart/data/imaging review, evaluation, exam, formulation of plan, , note preparation, and discussions with staff involved in this patient's care.    Raúl Dietz MD  Nephrology  [facility]  [phone]

## 2022-11-21 NOTE — PLAN OF CARE
Problem: Parenteral Nutrition  Goal: Effective Intravenous Nutrition Therapy Delivery  Outcome: Ongoing, Progressing  Intervention: Optimize Intravenous Nutrition Delivery  Flowsheets (Taken 11/21/2022 1051)  Nutrition Support Management: (New TPN ordered) other (see comments)

## 2022-11-21 NOTE — PROGRESS NOTES
[facility] Hospital Medicine  Progress Note    Patient Name: Shara Hutchins  MRN: [id number]  Patient Class: IP- Inpatient   Admission Date: 11/16/2022  Length of Stay: 5 days  Attending Physician: Dana Ovalle MD  Primary Care Provider: April Horton MD        Subjective:     Principal Problem:Ischemic colitis        HPI:  Ms. Hutchins is a 72-year-old female who was brought to the ED via EMS from outpatient dialysis center due to hypotension.  Patient with complicated recent history, about 2 months ago, admitted with ischemic colitis status post laparotomy with colostomy, stated she stayed in the hospital for about 1 month, she was discharge but had fall at assisted living facility and re-presented to the hospital following day, since then has had persistent hypotension, antihypertensives were discontinued, she was started on midodrine.  She was subsequently discharged to inpatient rehab and states she had returned to assisted living facility about 2 weeks ago.  Known history of ESRD on HD MWF.  She states blood pressure has been running low since previous admission, yesterday midodrine was increased from 5 mg t.i.d. to 10 mg t.i.d..  States her appetite has been low, over the last 24 hours has noticed decreased output from her colostomy, chronic nausea without any recent change, no significant increased abdominal pain.  States at dialysis today, near end of session, needle got dislodged from fistula, about 300 cc of blood loss, states following this she became lightheaded/dizzy, blood pressure 60/30 and she was referred to the ED.  States with dialysis recently they have not been removing fluid due to hypotension.  States she does have home health at assisted living facility.  In the ED blood pressure was low, 94/56, received fluid bolus, subsequently started on Levophed, during my encounter blood pressure 116/57 on Levophed 0.3 mcg/kg/min.  Patient is currently reporting back pain.  Labs with WBC  11.36, hemoglobin 8.6, platelet 162, potassium 1.9, magnesium 1.2, albumin 2.2, lactic acid 2.2, BUN/creatinine 5/2.4.  EKG reviewed.  Chest x-ray clear.  Previous echo with EF of 70%.  She received acetaminophen, magnesium, potassium, 472 cc fluid bolus as well as cefepime in the ED.  Case discussed with ED provider who requested admission.  Plan of care was discussed with patient, no visitors at bedside.  Address code status and patient confirms DNR.      Overview/Hospital Course:  Patient presented with worsening hypotension from outpatient dialysis center.  Recent complicated past medical history.  She received IV fluid and was started on Levophed.  CT abdomen and pelvis obtained with diffuse pneumatosis intestinal most prominent ascending, transverse and descending colon with portal vein gas.  She was started on broad-spectrum antibiotics.  General surgery was consulted and underwent emergent exploratory laparotomy with total colectomy, creation of ileostomy, inadvertent bladder injury requiring repair.  She was extubated postprocedure and transferred to the ICU.  Received 1 unit PRBC and FFP.  On 11/17 reports nausea, abdominal pain, still on Levophed.  11/18 still with significant abdominal pain, no output from ileostomy, receiving dialysis today, running even, still on Levophed, resuming home midodrine and attempting to wean off.  On 11/20, received unit of PRBC yesterday, encephalopathy persists, CT head negative encephalopathy labs ordered, having output from ileostomy and advanced to clear liquid diet.  Urine output from Godwin is now yellow.  On 11/21, encephalopathy persists, exam is nonfocal, having output from ileostomy, not safe for oral intake, on TPN, due for dialysis today, will re-evaluate after clearance to see if mentation has improved.      Interval History:  Patient remains encephalopathic.  She continues to spontaneously move all 4 extremities, with repositioning/movement does moan/grown as  per nursing, she did not answer my questions or follow simple commands during my encounter.  She remains on Levophed, not safe oral intake or medication.  Ileostomy continues with dark green liquid output.  Afebrile with T-max 98.3°.  On nasal cannula.  Labs with hemoglobin 8.7, platelets 76, BUN/creatinine 32/4.3.  Albumin 1.5.  Discussed with nursing.    Review of Systems   Unable to perform ROS: Mental status change   Objective:     Vital Signs (Most Recent):  Temp: 97.9 °F (36.6 °C) (11/21/22 0301)  Pulse: 62 (11/21/22 0800)  Resp: 13 (11/21/22 0800)  BP: (!) 102/44 (11/20/22 1901)  SpO2: (!) 94 % (11/21/22 0800)   Vital Signs (24h Range):  Temp:  [97 °F (36.1 °C)-98.3 °F (36.8 °C)] 97.9 °F (36.6 °C)  Pulse:  [53-86] 62  Resp:  [9-35] 13  SpO2:  [90 %-99 %] 94 %  BP: ()/() 102/44  Arterial Line BP: ()/(32-54) 123/39     Weight: 47.2 kg (104 lb 0.9 oz)  Body mass index is 16.3 kg/m².    Intake/Output Summary (Last 24 hours) at 11/21/2022 0814  Last data filed at 11/21/2022 0630  Gross per 24 hour   Intake 2157.87 ml   Output 801 ml   Net 1356.87 ml      Physical Exam  Vitals and nursing note reviewed.   Constitutional:       Comments: Chronically and critically ill appearing, lying in bed   HENT:      Head: Normocephalic and atraumatic.      Mouth/Throat:      Mouth: Mucous membranes are moist.   Eyes:      General:         Right eye: No discharge.         Left eye: No discharge.   Neck:      Comments: LIJ in place  Cardiovascular:      Rate and Rhythm: Normal rate and regular rhythm.      Comments: Wearing SCDs  Pulmonary:      Comments: On NC, decreased at bases, no wheeze, no accessory muscle use  Abdominal:      Tenderness: There is abdominal tenderness.      Comments: Scaphoid, ileostomy RLQ, surgical dressing in place, dark green output seen in ileostomy   Genitourinary:     Comments: Godwin in place with dark yellow urine  Skin:     Comments: Right radial arterial line, LUE AVF. Edema  bilateral hands R>L   Neurological:      Mental Status: She is alert.      Comments: Eyes open, appears to be moving all 4 extremities spontaneously but not to command, did not verbalize during my encounter        Significant Labs: Blood Culture: No results for input(s): LABBLOO in the last 48 hours.  BMP:   Recent Labs   Lab 11/21/22  0409   *      K 4.0      CO2 22*   BUN 32*   CREATININE 4.3*   CALCIUM 8.2*   MG 3.0*     CBC:   Recent Labs   Lab 11/20/22 0414 11/21/22  0409   WBC 8.63 10.04   HGB 8.6* 8.7*   HCT 26.6* 27.0*   PLT 67* 76*     CMP:   Recent Labs   Lab 11/20/22 0414 11/21/22  0409   * 137   K 3.8 4.0    108   CO2 22* 22*   * 148*   BUN 22 32*   CREATININE 3.5* 4.3*   CALCIUM 7.9* 8.2*   PROT 4.2* 4.1*   ALBUMIN 1.6* 1.5*   BILITOT 4.3* 3.3*   ALKPHOS 355* 454*   AST 36 27   ALT 27 26   ANIONGAP 6* 7*     Cardiac Markers: No results for input(s): CKMB, MYOGLOBIN, BNP, TROPISTAT in the last 48 hours.  Lactic Acid:   Recent Labs   Lab 11/19/22  1124   LACTATE 1.2     Magnesium:   Recent Labs   Lab 11/20/22 0414 11/21/22  0409   MG 3.0* 3.0*     POCT Glucose: No results for input(s): POCTGLUCOSE in the last 48 hours.    Significant Imaging: I have reviewed all pertinent imaging results/findings within the past 24 hours.    CT head  FINDINGS:  Gray-white differentiation is maintained without hemorrhage, midline shift, or mass effect.  Chronic small vessel ischemic changes in periventricular and subcortical white matter unchanged.     The ventricles and cisterns are maintained.     Calvarium is intact. Right sphenoid sinus fluid level is normal.     IMPRESSION:     1. No acute intracranial abnormality.  2. Unchanged chronic small vessel ischemic changes.  3. Right sphenoid sinus fluid level could reflect acute sinusitis in the appropriate clinical setting.      Assessment/Plan:      Active Hospital Problems    Diagnosis    *Ischemic colitis s/p laprotomy with  total colectomy and ileostomy, 11/17    Encephalopathy, metabolic, fluctuating    Status post total colectomy    Generalized weakness    Bladder injury    Hypotension, acute on chronic    QT prolongation    Thrombocytopenia    S/P ureteral stent placement    ESRD on dialysis MWF    Bilateral LE neuropathy    Severe malnutrition    Hypothyroidism    Anemia of chronic disease, status post 1 unit PRBC 11/19    Hisotry of Vulvar cancer     Plan:  Continue care in ICU with continuous cardiac telemetry monitoring  Postop care as per General surgery   Ileostomy currently has output, placed on clear liquid diet however mentation precluding oral intake  Fluctuating mentation continues, encephalopathy labs reviewed, ammonia normal, CT head no acute, exam nonfocal, likely delirium  Continue TPN for nutrition   Chest x-ray and KUB  Anemia acute on chronic, status post 1 unit PRBC 11/19, trending, ADAM with dialysis  Continue Levophed, titrate for map greater than 60   Echocardiogram with EF of 75%   Continue p.r.n. antiemetic and analgesic  Bladder injury intraoperatively, keep Godwin catheter in place for at least 2 weeks  Renally dosing all medications and avoid nephrotoxin drugs  Fall and delirium precautions  A.m. labs ordered  Appreciate all consultant's input   Increase activity, PT/OT following   Seen by Urology, , Godwin catheter in place at least 2 weeks, cystogram prior to removal, will need stent removed in few months, follow-up with Dr. Barber  Consult Neurology given persistent encephalopathy  Further plan as per clinical course     VTE Risk Mitigation (From admission, onward)         Ordered     IP VTE HIGH RISK PATIENT  Once         11/16/22 1845     Place sequential compression device  Until discontinued         11/16/22 1845                Discharge Planning   RODRIGO:      Code Status: DNR   Is the patient medically ready for discharge?:     Reason for patient still in hospital (select all that  apply): Patient trending condition  Discharge Plan A: Home Health                  Dana Ovalle MD  Department of Hospital Medicine   [facility]

## 2022-11-21 NOTE — CARE UPDATE
CONTINUE OXYGEN   11/21/22 0800   PRE-TX-O2   O2 Device (Oxygen Therapy) nasal cannula   Flow (L/min) 2   Pulse Oximetry Type Continuous   $ Pulse Oximetry - Multiple Charge Pulse Oximetry - Multiple   Education   $ Education DME Oxygen;15 min   Respiratory Evaluation   $ Care Plan Tech Time 15 min   $ Eval/Re-eval Charges Re-evaluation

## 2022-11-21 NOTE — NURSING
Consent and hep b status verified, removed 1500 uf net, post thrill and bruit noted. Difficult cannulation, due to stents. Arterial site bled > 9 min. Patient stable throughout treatment with levo. Unable to educate patient due to LOC. Report given to CHERRY Verduzco       11/21/22 7817   Handoff Report   Received From Adrienne   Given To Luba   Vital Signs   Temp 97 °F (36.1 °C)   Temp src Axillary   Pulse 102   Heart Rate Source Monitor;Continuous   Resp (!) 26   SpO2 (!) 92 %   Pulse Oximetry Type Continuous   Oximetry Probe Site Assessed;Intact;No Change Needed   BP Location Right leg   BP Method Automatic   Patient Position Lying   Art Line   Arterial Line /54   Arterial Line MAP (mmHg) 81 mmHg   Assessments (Pre/Post)   Consent Obtained yes   Safety vein preservation armband present   Date Hepatitis Profile Obtained 01/17/22   Blood Liters Processed (BLP) 59.5   Transport Modality bed   Level of Consciousness (AVPU) responds to voice   Dialyzer Clearance mildly streaked   Pain   Preferred Pain Scale FACES (Scott-Walker FACES Pain Rating Scale)   Comfort/Acceptable Pain Level 0   Pain Rating (0-10): Rest 0   Pain Rating (0-10): Activity 0   Pain Management Interventions quiet environment facilitated;position adjusted;pillow support provided   Pain/Comfort Interventions   Fever Reduction/Comfort Measures lightweight clothing;lightweight bedding   Pre-Hemodialysis Assessment   Additional Dialysis Information Needed Yes   Patient Status Departed   Treatment Consent Verified Yes   Treatment Status Completed        Hemodialysis AV Fistula Left upper arm   No Placement Date or Time found.   Present Prior to Hospital Arrival?: Yes  Location: Left upper arm   Site Assessment Clean;Dry;Intact   Patency Present;Thrill;Bruit   Status Deaccessed   Dressing Intervention First dressing   Dressing Status Clean;Dry;Intact   Site Condition No complications   Dressing Gauze   Post-Hemodialysis Assessment   Rinseback Volume (mL) 250  mL   Blood Volume Processed (Liters) 59.5 L   Dialyzer Clearance Lightly streaked   Duration of Treatment 180 minutes   Additional Fluid Intake (mL) 2150 mL   Total UF (mL) 650 mL   Net Fluid Removal 1500   Patient Response to Treatment federica   Post-Treatment Weight 46.5 kg (102 lb 8.2 oz)   Treatment Weight Change -1.5   Arterial bleeding stop time (min) 6 min   Venous bleeding stop time (min) 6 min   Post-Hemodialysis Comments stable   Edema   Edema hand, right;hand, left

## 2022-11-21 NOTE — CARE UPDATE
11/20/22 2130   Patient Assessment/Suction   Level of Consciousness (AVPU) responds to voice   Respiratory Effort Unlabored   Expansion/Accessory Muscles/Retractions no use of accessory muscles   All Lung Fields Breath Sounds clear;diminished   Rhythm/Pattern, Respiratory no shortness of breath reported   Cough Frequency no cough   PRE-TX-O2   O2 Device (Oxygen Therapy) nasal cannula   $ Is the patient on Low Flow Oxygen? Yes   Flow (L/min) 2   SpO2 96 %   Pulse Oximetry Type Continuous   $ Pulse Oximetry - Multiple Charge Pulse Oximetry - Multiple   Pulse 71   Resp (!) 23   Positioning   Head of Bed (HOB) Positioning HOB elevated;HOB at 30 degrees   Respiratory Evaluation   $ Care Plan Tech Time 15 min

## 2022-11-21 NOTE — SUBJECTIVE & OBJECTIVE
Interval History:  Patient remains encephalopathic.  She continues to spontaneously move all 4 extremities, with repositioning/movement does moan/grown as per nursing, she did not answer my questions or follow simple commands during my encounter.  She remains on Levophed, not safe oral intake or medication.  Ileostomy continues with dark green liquid output.  Afebrile with T-max 98.3°.  On nasal cannula.  Labs with hemoglobin 8.7, platelets 76, BUN/creatinine 32/4.3.  Albumin 1.5.  Discussed with nursing.    Review of Systems   Unable to perform ROS: Mental status change   Objective:     Vital Signs (Most Recent):  Temp: 97.9 °F (36.6 °C) (11/21/22 0301)  Pulse: 62 (11/21/22 0800)  Resp: 13 (11/21/22 0800)  BP: (!) 102/44 (11/20/22 1901)  SpO2: (!) 94 % (11/21/22 0800)   Vital Signs (24h Range):  Temp:  [97 °F (36.1 °C)-98.3 °F (36.8 °C)] 97.9 °F (36.6 °C)  Pulse:  [53-86] 62  Resp:  [9-35] 13  SpO2:  [90 %-99 %] 94 %  BP: ()/() 102/44  Arterial Line BP: ()/(32-54) 123/39     Weight: 47.2 kg (104 lb 0.9 oz)  Body mass index is 16.3 kg/m².    Intake/Output Summary (Last 24 hours) at 11/21/2022 0814  Last data filed at 11/21/2022 0630  Gross per 24 hour   Intake 2157.87 ml   Output 801 ml   Net 1356.87 ml      Physical Exam  Vitals and nursing note reviewed.   Constitutional:       Comments: Chronically and critically ill appearing, lying in bed   HENT:      Head: Normocephalic and atraumatic.      Mouth/Throat:      Mouth: Mucous membranes are moist.   Eyes:      General:         Right eye: No discharge.         Left eye: No discharge.   Neck:      Comments: LIJ in place  Cardiovascular:      Rate and Rhythm: Normal rate and regular rhythm.      Comments: Wearing SCDs  Pulmonary:      Comments: On NC, decreased at bases, no wheeze, no accessory muscle use  Abdominal:      Tenderness: There is abdominal tenderness.      Comments: Scaphoid, ileostomy RLQ, surgical dressing in place, dark green output  seen in ileostomy   Genitourinary:     Comments: Godwin in place with dark yellow urine  Skin:     Comments: Right radial arterial line, LUE AVF. Edema bilateral hands R>L   Neurological:      Mental Status: She is alert.      Comments: Eyes open, appears to be moving all 4 extremities spontaneously but not to command, did not verbalize during my encounter        Significant Labs: Blood Culture: No results for input(s): LABBLOO in the last 48 hours.  BMP:   Recent Labs   Lab 11/21/22  0409   *      K 4.0      CO2 22*   BUN 32*   CREATININE 4.3*   CALCIUM 8.2*   MG 3.0*     CBC:   Recent Labs   Lab 11/20/22  0414 11/21/22  0409   WBC 8.63 10.04   HGB 8.6* 8.7*   HCT 26.6* 27.0*   PLT 67* 76*     CMP:   Recent Labs   Lab 11/20/22 0414 11/21/22  0409   * 137   K 3.8 4.0    108   CO2 22* 22*   * 148*   BUN 22 32*   CREATININE 3.5* 4.3*   CALCIUM 7.9* 8.2*   PROT 4.2* 4.1*   ALBUMIN 1.6* 1.5*   BILITOT 4.3* 3.3*   ALKPHOS 355* 454*   AST 36 27   ALT 27 26   ANIONGAP 6* 7*     Cardiac Markers: No results for input(s): CKMB, MYOGLOBIN, BNP, TROPISTAT in the last 48 hours.  Lactic Acid:   Recent Labs   Lab 11/19/22  1124   LACTATE 1.2     Magnesium:   Recent Labs   Lab 11/20/22 0414 11/21/22  0409   MG 3.0* 3.0*     POCT Glucose: No results for input(s): POCTGLUCOSE in the last 48 hours.    Significant Imaging: I have reviewed all pertinent imaging results/findings within the past 24 hours.    CT head  FINDINGS:  Gray-white differentiation is maintained without hemorrhage, midline shift, or mass effect.  Chronic small vessel ischemic changes in periventricular and subcortical white matter unchanged.     The ventricles and cisterns are maintained.     Calvarium is intact. Right sphenoid sinus fluid level is normal.     IMPRESSION:     1. No acute intracranial abnormality.  2. Unchanged chronic small vessel ischemic changes.  3. Right sphenoid sinus fluid level could reflect acute  sinusitis in the appropriate clinical setting.

## 2022-11-22 NOTE — PLAN OF CARE
Problem: Adult Inpatient Plan of Care  Goal: Plan of Care Review  Outcome: Ongoing, Progressing  Goal: Patient-Specific Goal (Individualized)  Outcome: Ongoing, Progressing  Goal: Absence of Hospital-Acquired Illness or Injury  Outcome: Ongoing, Progressing  Goal: Optimal Comfort and Wellbeing  Outcome: Ongoing, Progressing  Goal: Readiness for Transition of Care  Outcome: Ongoing, Progressing     Problem: Glycemic Control Impaired (Sepsis/Septic Shock)  Goal: Blood Glucose Level Within Desired Range  Outcome: Ongoing, Progressing     Problem: Infection Progression (Sepsis/Septic Shock)  Goal: Absence of Infection Signs and Symptoms  Outcome: Ongoing, Progressing     Problem: Fluid Imbalance (Pneumonia)  Goal: Fluid Balance  Outcome: Ongoing, Progressing     Problem: Infection (Pneumonia)  Goal: Resolution of Infection Signs and Symptoms  Outcome: Ongoing, Progressing     Problem: Respiratory Compromise (Pneumonia)  Goal: Effective Oxygenation and Ventilation  Outcome: Ongoing, Progressing     Problem: Infection  Goal: Absence of Infection Signs and Symptoms  Outcome: Ongoing, Progressing     Problem: Impaired Wound Healing  Goal: Optimal Wound Healing  Outcome: Ongoing, Progressing     Problem: Fall Injury Risk  Goal: Absence of Fall and Fall-Related Injury  Outcome: Ongoing, Progressing     Problem: Skin Injury Risk Increased  Goal: Skin Health and Integrity  Outcome: Ongoing, Progressing     Problem: Parenteral Nutrition  Goal: Effective Intravenous Nutrition Therapy Delivery  Outcome: Ongoing, Progressing     Problem: Device-Related Complication Risk (Hemodialysis)  Goal: Safe, Effective Therapy Delivery  Outcome: Ongoing, Progressing     Problem: Infection (Hemodialysis)  Goal: Absence of Infection Signs and Symptoms  Outcome: Ongoing, Progressing

## 2022-11-22 NOTE — PT/OT/SLP PROGRESS
Physical Therapy      Patient Name:  Shara Hutchins   MRN:  [id number]    Patient not seen today secondary to Dialysis, Testing/imaging (xray/CT/MRI) (EEG in am and dialysis in pm). Will follow-up 11/23/22.

## 2022-11-22 NOTE — PLAN OF CARE
Patient was informed of plan of care with no evidence of understanding.  Patient will open eyes when name called but does not follow commands, and does not acknowledge understanding.  Continue to monitor  Problem: Adult Inpatient Plan of Care  Goal: Plan of Care Review  Outcome: Ongoing, Progressing  Goal: Patient-Specific Goal (Individualized)  Outcome: Ongoing, Progressing  Goal: Absence of Hospital-Acquired Illness or Injury  Outcome: Ongoing, Progressing  Goal: Optimal Comfort and Wellbeing  Outcome: Ongoing, Progressing  Goal: Readiness for Transition of Care  Outcome: Ongoing, Progressing     Problem: Adjustment to Illness (Sepsis/Septic Shock)  Goal: Optimal Coping  Outcome: Ongoing, Progressing     Problem: Bleeding (Sepsis/Septic Shock)  Goal: Absence of Bleeding  Outcome: Ongoing, Progressing     Problem: Glycemic Control Impaired (Sepsis/Septic Shock)  Goal: Blood Glucose Level Within Desired Range  Outcome: Ongoing, Progressing     Problem: Infection Progression (Sepsis/Septic Shock)  Goal: Absence of Infection Signs and Symptoms  Outcome: Ongoing, Progressing     Problem: Nutrition Impaired (Sepsis/Septic Shock)  Goal: Optimal Nutrition Intake  Outcome: Ongoing, Progressing     Problem: Fluid and Electrolyte Imbalance (Acute Kidney Injury/Impairment)  Goal: Fluid and Electrolyte Balance  Outcome: Ongoing, Progressing     Problem: Oral Intake Inadequate (Acute Kidney Injury/Impairment)  Goal: Optimal Nutrition Intake  Outcome: Ongoing, Progressing     Problem: Renal Function Impairment (Acute Kidney Injury/Impairment)  Goal: Effective Renal Function  Outcome: Ongoing, Progressing     Problem: Fluid Imbalance (Pneumonia)  Goal: Fluid Balance  Outcome: Ongoing, Progressing     Problem: Infection (Pneumonia)  Goal: Resolution of Infection Signs and Symptoms  Outcome: Ongoing, Progressing     Problem: Respiratory Compromise (Pneumonia)  Goal: Effective Oxygenation and Ventilation  Outcome: Ongoing,  Progressing     Problem: Infection  Goal: Absence of Infection Signs and Symptoms  Outcome: Ongoing, Progressing     Problem: Impaired Wound Healing  Goal: Optimal Wound Healing  Outcome: Ongoing, Progressing     Problem: Fall Injury Risk  Goal: Absence of Fall and Fall-Related Injury  Outcome: Ongoing, Progressing     Problem: Skin Injury Risk Increased  Goal: Skin Health and Integrity  Outcome: Ongoing, Progressing     Problem: Parenteral Nutrition  Goal: Effective Intravenous Nutrition Therapy Delivery  Outcome: Ongoing, Progressing     Problem: Device-Related Complication Risk (Hemodialysis)  Goal: Safe, Effective Therapy Delivery  Outcome: Ongoing, Progressing     Problem: Hemodynamic Instability (Hemodialysis)  Goal: Effective Tissue Perfusion  Outcome: Ongoing, Progressing     Problem: Infection (Hemodialysis)  Goal: Absence of Infection Signs and Symptoms  Outcome: Ongoing, Progressing     Problem: Oral Intake Inadequate  Goal: Improved Oral Intake  Outcome: Ongoing, Progressing     Problem: Adjustment to Surgery (Colostomy)  Goal: Optimal Coping  Outcome: Ongoing, Progressing     Problem: Bleeding (Colostomy)  Goal: Absence of Bleeding  Outcome: Ongoing, Progressing     Problem: Fluid, Electrolyte and Nutrition Imbalance (Colostomy)  Goal: Fluid, Electrolyte and Nutrition Balance  Outcome: Ongoing, Progressing     Problem: Infection (Colostomy)  Goal: Absence of Infection Signs and Symptoms  Outcome: Ongoing, Progressing     Problem: Ongoing Anesthesia Effects (Colostomy)  Goal: Anesthesia/Sedation Recovery  Outcome: Ongoing, Progressing     Problem: Pain (Colostomy)  Goal: Acceptable Pain Control  Outcome: Ongoing, Progressing     Problem: Postoperative Nausea and Vomiting (Colostomy)  Goal: Nausea and Vomiting Relief  Outcome: Ongoing, Progressing     Problem: Postoperative Stoma Care (Colostomy)  Goal: Optimal Stoma Healing  Outcome: Ongoing, Progressing     Problem: Postoperative Urinary Retention  (Colostomy)  Goal: Effective Urinary Elimination  Outcome: Ongoing, Progressing     Problem: Respiratory Compromise (Colostomy)  Goal: Effective Oxygenation and Ventilation  Outcome: Ongoing, Progressing

## 2022-11-22 NOTE — NURSING
MD notified of increasing Hematuria and drop in H/H.  Orders for 2 units of PRBC's ordered, and reconsult Urology.

## 2022-11-22 NOTE — PROGRESS NOTES
[facility]  Adult Nutrition   Progress Note (Nutrition Support Management)    SUMMARY     Recommendations  Recommendation/Intervention:   1. New TPN ordered to be initiated @ 1700. Run @ 50 mL/hr.   2. Blood glucose and electrolytes to be monitored and corrected accordingly.   3. RD to monitor TPN infusion and tolerance. Monitor I/Os. Monitor and mange nutrition support daily.   4. Clear liquids to be provided by nursing as tolerated, diet to advance as medically able. 5. RD added ONS, Ensure Clear TID (to provide 720 kcal/day and 24 g/day protein)    Goals:   1. Pt to meet estimated needs via TPN provision + PO intake.   2. Patient to tolerate diet.   3. BG and electrolytes to trend towards normal limits.   4. Transitional feeding from TPN to PO once oral intake is estimated to meet at least 60% of needs.  Nutrition Goal Status: progressing towards goal    Dietitian Rounds Brief  TPN to continue. New TPN ordered.    PARENTERAL NUTRITION PROGRESS NOTE:    Parenteral Nutrition Day # 6  Diagnosis/Indication for PN: S/p total colectomy, malnutrition  IV Access: Percutaneous Central Line  Diet/Tube Feeding: trial clear liquids, ensure clear TID      Admixture Type: 3-in-1  Infusion Rate and Frequency: 50 mL/hr  Patient Acuity: ICU     PN Composition:   66 grams Amino Acid, 196 grams Dextrose, and 50 grams Lipid.    Today's TPN provides 1430 kcal.  *Dextrose is started at less than full dextrose goal to reduce risk for Refeeding Syndrome. Dextrose content will be advanced as appropriate over the next few days.     Please see order for electrolytes and additives content and adjustments.   *The Nutrition Support Team will continue to monitor electrolytes daily and adjust parenteral nutrition as warranted.     Malnutrition Assessment:  Pt is w/ previous malnutrition d/x. Per chart review, pt weighted 156 lbs on 9/822 and now currently weights 104 lbs. Pt has lost 52 lbs within a 2 months time span (33% body  "wt loss) - severe. RD agrees w/ previous d/x s/t BMI of 16.28 kg/m2, and 52 lbs wt loss (33% body wt loss).      Severe malnutrition related to decreased intake in pt w/ increased needs d/t HD, cancer as evidenced by BMI of 16.28 kg/m2, 52 lbs wt loss (33% body wt loss), and < 75% of estimated energy requirement for > 1 month.    Diet order:   Current Diet Order: clear liquid - provided by nursing   Oral Nutrition Supplement: ensure clear TID  Current Nutrition Support Formula Ordered: Other (Comment) (custom 3-in-1 TPN)  Current Nutrition Support Rate Ordered: 50 (ml)  Current Nutrition Support Frequency Ordered: ml/hr    Evaluation of Received Nutrient/Fluid Intake  Parenteral Calories (kcal): 1423  Parenteral Protein (gm): 65  Parenteral Fluid (mL): 1200  Lipid Calories (kcals): 500 kcals  Energy Calories Required: not meeting needs  Protein Required: not meeting needs  Fluid Required: not meeting needs  Tolerance: tolerating     % Intake of Estimated Energy Needs: 75 - 100 %  % Meal Intake: 0 - 25 %      Intake/Output Summary (Last 24 hours) at 11/22/2022 1147  Last data filed at 11/22/2022 0848  Gross per 24 hour   Intake 3220.72 ml   Output 3335 ml   Net -114.28 ml        Anthropometrics  Temp: 97.4 °F (36.3 °C)  Height Method: Stated  Height: 5' 7" (170.2 cm)  Height (inches): 67 in  Weight Method: Bed Scale  Weight: 47.2 kg (104 lb 0.9 oz)  Weight (lb): 104.06 lb  Ideal Body Weight (IBW), Female: 135 lb  % Ideal Body Weight, Female (lb): 77.08 %  BMI (Calculated): 16.3  BMI Grade: 16 - 16.9 protein-energy malnutrition grade II       Estimated/Assessed Needs  Weight Used For Calorie Calculations: 47.2 kg (104 lb 0.9 oz)  Energy Calorie Requirements (kcal): 6876-7698 (35-40)  Energy Need Method: Kcal/kg  Protein Requirements: 57-71 (1.2-1.5 gm/kg)  Weight Used For Protein Calculations: 47.2 kg (104 lb 0.9 oz)  Fluid Requirements (mL): 24 hour UOP + 1000 mL     RDA Method (mL): 1652       Reason for " Assessment  Reason For Assessment: consult, new TPN, RD follow-up  Diagnosis: other (see comments) (Status post total colectomy)  Relevant Medical History: Vulvar cancer, hypotension, hypothyroidism, anemia of chronic disease, severe malnutrition, ESRD on dialysis MWF    Nutrition/Diet History  Food Allergies: NKFA  Factors Affecting Nutritional Intake: impaired cognitive status/motor control, altered gastrointestinal function, clear liquid diet    Nutrition Risk Screen  Nutrition Risk Screen: tube feeding or parenteral nutrition     MST Score: 0  Have you recently lost weight without trying?: No  Weight loss score: 0  Have you been eating poorly because of a decreased appetite?: No  Appetite score: 0       Weight History:  Wt Readings from Last 5 Encounters:   11/17/22 47.2 kg (104 lb 0.9 oz)   11/17/22 47.2 kg (104 lb)   10/29/22 50.8 kg (112 lb)   09/15/22 70.8 kg (156 lb)   09/08/22 70.9 kg (156 lb 3.2 oz)        Medications:Pertinent Medications Reviewed  Scheduled Meds:   epoetin nica-ebpx (RETACRIT) injection  100 Units/kg Intravenous Every Mon, Wed, Fri    levothyroxine  50 mcg Intravenous Q24H    midodrine  5 mg Oral TID    valproate sodium (DEPACON) IVPB  250 mg Intravenous Q8H     Continuous Infusions:   NORepinephrine bitartrate-D5W Stopped (11/22/22 0848)    TPN ADULT CENTRAL LINE CUSTOM (3 in 1) 50 mL/hr at 11/21/22 1715    TPN ADULT CENTRAL LINE CUSTOM (3 in 1)       PRN Meds:.sodium chloride, acetaminophen, calcium gluconate IVPB, calcium gluconate IVPB, calcium gluconate IVPB, dextrose 10%, dextrose 10%, glucagon (human recombinant), glucose, glucose, HYDROcodone-acetaminophen, HYDROmorphone, LIDOcaine HCl 2%, magnesium sulfate IVPB, magnesium sulfate IVPB, naloxone, ondansetron, potassium chloride in water **AND** potassium chloride in water **AND** potassium chloride in water, prochlorperazine, senna-docusate 8.6-50 mg, sodium chloride 0.9%, sodium phosphate IVPB, sodium phosphate IVPB, sodium  phosphate IVPB    Labs: Pertinent Labs Reviewed  Clinical Chemistry:  Recent Labs   Lab 11/20/22  0414 11/21/22  0409 11/22/22  0346   * 137 137   K 3.8 4.0 4.2    108 106   CO2 22* 22* 26   * 148* 184*   BUN 22 32* 30*   CREATININE 3.5* 4.3* 3.2*   CALCIUM 7.9* 8.2* 7.9*   PROT 4.2* 4.1* 4.2*   ALBUMIN 1.6* 1.5* 1.5*   BILITOT 4.3* 3.3* 2.3*   ALKPHOS 355* 454* 416*   AST 36 27 21   ALT 27 26 20   ANIONGAP 6* 7* 5*   MG 3.0* 3.0* 2.3   PHOS 2.0* 2.4* 3.4     CBC:   Recent Labs   Lab 11/22/22  0346   WBC 13.13*   RBC 2.36*   HGB 7.6*   HCT 23.4*   PLT 83*   MCV 99*   MCH 32.2*   MCHC 32.5     Lipid Panel:  Recent Labs   Lab 11/18/22  0400   TRIG 96     Cardiac Profile:  Recent Labs   Lab 11/16/22  1318   *     Thyroid & Parathyroid:  Recent Labs   Lab 11/21/22  1043   TSH 3.810       Monitor and Evaluation  Food and Nutrient Intake: energy intake, food and beverage intake, parenteral nutrition intake  Food and Nutrient Adminstration: diet order, enteral and parenteral nutrition administration  Knowledge/Beliefs/Attitudes: food and nutrition knowledge/skill  Physical Activity and Function: nutrition-related ADLs and IADLs, factors affecting access to physical activity  Anthropometric Measurements: weight, weight change, body mass index  Biochemical Data, Medical Tests and Procedures: inflammatory profile, electrolyte and renal panel, gastrointestinal profile, lipid profile, glucose/endocrine profile  Nutrition-Focused Physical Findings: overall appearance     Nutrition Risk  Level of Risk/Frequency of Follow-up: high     Nutrition Follow-Up  RD Follow-up?: Yes      Alesia Rangel RD 11/22/2022 11:47 AM

## 2022-11-22 NOTE — PLAN OF CARE
Problem: Parenteral Nutrition  Goal: Effective Intravenous Nutrition Therapy Delivery  Outcome: Ongoing, Progressing  Intervention: Optimize Intravenous Nutrition Delivery  Flowsheets (Taken 11/22/2022 1202)  Nutrition Support Management: (New TPN) other (see comments)

## 2022-11-22 NOTE — PROGRESS NOTES
formerly [facility]  Urology  Progress Note    Patient Name: Shara Hutchins  MRN: [id number]  Admission Date: 11/16/2022  Hospital Length of Stay: 6 days  Code Status: DNR   Attending Provider: Carlitos Bowden MD   Primary Care Physician: April Horton MD    Subjective:     HPI:  Shara Hutchins is a 72 y.o. female brought to ED yesterday with hypotension at outpatient dialysis.     Patient with complicated recent history, about 2 months ago, admitted with ischemic colitis status post laparotomy with colostomy, stated she stayed in the hospital for about 1 month, she was discharge but had fall at assisted living facility and re-presented to the hospital following day. She was subsequently discharged to inpatient rehab and states she had returned to assisted living facility about 2 weeks ago.  Known history of ESRD on HD MWF.     In the ED blood pressure was low, 94/56, received fluid bolus, subsequently started on Levophed. Labs with WBC 11.36, hemoglobin 8.6, platelet 162, potassium 1.9, magnesium 1.2, albumin 2.2, lactic acid 2.2, BUN/creatinine 5/2.4.    She has an extensive hx of stones and ureteral stricture secondary to hx of radiation for vulvar cancer. Most recently taken to OR by Dr. Barber on 8/30/22. She makes minimal urine. She is managed with serial stent exchanges.     CT imaging that showed pneumatosis of the colon and portal venous gas in the left liver. No hydro is noted. The right ureteral stent is in good position. She was taken urgently to the OR by Dr. Broderick. During procedure a cystotomy was made. Stent coil was visualized within the bladder. Bladder was repaired in 2 layers. No JOHN drain was placed.       Interval History:   Called due to worsening hematuria   Urine had been clearing however today had bright red blood in lee  Per nursing staff has been clearing.   She is still making minimal urine overall    Review of Systems  Objective:     Temp:  [97.4 °F (36.3 °C)-98.8 °F (37.1 °C)] 98.2  °F (36.8 °C)  Pulse:  [] 91  Resp:  [13-27] 23  SpO2:  [88 %-99 %] 94 %  BP: ()/(39-73) 134/53  Arterial Line BP: (101-172)/(39-69) 132/50     Body mass index is 16.3 kg/m².    Date 11/22/22 0700 - 11/23/22 0659   Shift 0356-4700 1882-7487 0007-3550 24 Hour Total   INTAKE   Blood 664   664   Other  500  500   Shift Total(mL/kg) 664(14.1) 500(10.6)  1164(24.7)   OUTPUT   Urine(mL/kg/hr) 135(0.4)   135   Other  2000 2000   Shift Total(mL/kg) 135(2.9) 2000(42.4)  2135(45.2)   Weight (kg) 47.2 47.2 47.2 47.2          Drains       Drain  Duration                  Hemodialysis AV Fistula Left upper arm -- days         Ureteral Drain/Stent 08/30/22 Right ureter 7 Fr. 84 days         Ileostomy 11/17/22 0100 RLQ 5 days         Urethral Catheter 11/17/22 5 days                    Physical Exam  Vitals reviewed.   Constitutional:       General: She is not in acute distress.     Appearance: Normal appearance. She is ill-appearing. She is not toxic-appearing or diaphoretic.   HENT:      Head: Normocephalic and atraumatic.   Eyes:      General: No scleral icterus.  Cardiovascular:      Rate and Rhythm: Normal rate and regular rhythm.   Pulmonary:      Effort: Pulmonary effort is normal. No respiratory distress.   Abdominal:      General: There is distension.   Genitourinary:     Comments: Godwin with minimal bloody urine   Neurological:      Comments: Minimally responsive       Significant Labs:    BMP:  Recent Labs   Lab 11/20/22  0414 11/21/22  0409 11/22/22  0346   * 137 137   K 3.8 4.0 4.2    108 106   CO2 22* 22* 26   BUN 22 32* 30*   CREATININE 3.5* 4.3* 3.2*   CALCIUM 7.9* 8.2* 7.9*       CBC:   Recent Labs   Lab 11/20/22  0414 11/21/22  0409 11/22/22  0346   WBC 8.63 10.04 13.13*   HGB 8.6* 8.7* 7.6*   HCT 26.6* 27.0* 23.4*   PLT 67* 76* 83*       Urine Culture:   Recent Labs   Lab 11/16/22  2111 11/20/22  1501   LABURIN Multiple organisms isolated. None in predominance.  Repeat if  clinically  necessary. PRESUMPTIVE PATRICIA ALBICANS  50,000 - 99,999 cfu/ml  *     Urine Studies:   Recent Labs   Lab 11/16/22  2111 11/20/22  1501   COLORU Yellow Na   APPEARANCEUA Cloudy* Cloudy*   PHUR 8.0 7.0   SPECGRAV 1.010 1.010   PROTEINUA 3+* 2+*   GLUCUA Negative Negative   KETONESU Negative Negative   BILIRUBINUA Negative Negative   OCCULTUA 2+* 3+*   NITRITE Negative Negative   UROBILINOGEN Negative Negative   LEUKOCYTESUR 3+* 3+*   RBCUA 1 >100*   WBCUA >100* >100*   BACTERIA Negative Negative   SQUAMEPITHEL 0 0   HYALINECASTS 2* 5*       Significant Imaging:  All pertinent imaging results/findings from the past 24 hours have been reviewed.        Assessment/Plan:     Bladder injury  - Lee irrigated easily with a few old appearing clots removed  - Nursing staff instructed on proper irrigation.   - Maintain lee catheter x 2 weeks from surgery  - Nursing staff may irrigate gently PRN for hematuria   - She makes minimal urine and is on dialysis   - Given hx of radiation will need a cystogram prior to removal  - KUB yesterday showed stent in good position  - Has apt scheduled with Dr. Barber on 11/30        VTE Risk Mitigation (From admission, onward)         Ordered     IP VTE HIGH RISK PATIENT  Once         11/16/22 1845     Place sequential compression device  Until discontinued         11/16/22 1845                Chapis Rodriguez MD  Urology  [facility]

## 2022-11-22 NOTE — ASSESSMENT & PLAN NOTE
- Lee irrigated easily with a few old appearing clots removed  - Nursing staff instructed on proper irrigation.   - Maintain lee catheter x 2 weeks from surgery  - Nursing staff may irrigate gently PRN for hematuria   - She makes minimal urine and is on dialysis   - Given hx of radiation will need a cystogram prior to removal  - KUB yesterday showed stent in good position  - Has apt scheduled with Dr. Barber on 11/30

## 2022-11-22 NOTE — PROGRESS NOTES
INPATIENT NEPHROLOGY Progress Notes  [facility]    Patient Name: Shara Hutchins  Date: 11/22/2022    Reason for consultation: ESRD    Chief Complaint:   Chief Complaint   Patient presents with    Hypotension     Pt presents to the ER via EMS after being hypotensive at dialysis. Pt had systolic BP of 60 at dialysis.        History of Present Illness:  Ms. Hutchins is a 72-year-old female who was brought to the ED via EMS from outpatient dialysis center due to hypotension.  Patient with complicated recent history, about 2 months ago, admitted with ischemic colitis status post laparotomy with colostomy, stated she stayed in the hospital for about 1 month, she was discharge but had fall at assisted living facility and re-presented to the hospital following day, since then has had persistent hypotension, antihypertensives were discontinued, she was started on midodrine.  She was subsequently discharged to inpatient rehab and states she had returned to assisted living facility about 2 weeks ago.  Known history of ESRD on HD MWF.  She states blood pressure has been running low since previous admission, yesterday midodrine was increased from 5 mg t.i.d. to 10 mg t.i.d.. States her appetite has been low, over the last 24 hours has noticed decreased output from her colostomy, chronic nausea without any recent change, no significant increased abdominal pain.  States at dialysis today, near end of session, needle got dislodged from fistula, about 300 cc of blood loss, states following this she became lightheaded/dizzy, blood pressure 60/30 and she was referred to the ED.  States with dialysis recently they have not been removing fluid due to hypotension.  States she does have home health at assisted living facility.  In the ED blood pressure was low, 94/56, received fluid bolus, subsequently started on Levophed, during my encounter blood pressure 116/57 on Levophed 0.3 mcg/kg/min.  Patient is currently reporting  back pain.  Labs with WBC 11.36, hemoglobin 8.6, platelet 162, potassium 1.9, magnesium 1.2, albumin 2.2, lactic acid 2.2, BUN/creatinine 5/2.4.  EKG reviewed.  Chest x-ray clear.  Previous echo with EF of 70%.  She received acetaminophen, magnesium, potassium, 472 cc fluid bolus as well as cefepime in the ED. She was started on Levophed.  CTA abdomen and pelvis obtained with diffuse pneumatosis intestinal most prominent ascending, transverse and descending colon with portal vein gas.  She was started on broad-spectrum antibiotics.  General surgery was consulted and underwent emergent exploratory laparotomy with total colectomy, creation of ileostomy, inadvertent bladder injury requiring repair.  She was extubated postprocedure and transferred to the ICU.  Received 1 unit PRBC and FFP.  On 11/17 reports nausea, abdominal pain, still on Levophed. We are consulted for dialysis.    Interval History:  11/17- weaning pressors, stopped IVFs, can remove lee if ok with urology  11/18- still on levophed- reviewed urology note regarding lee- seen on HD- UF even  11/19- worsening mental status this AM- remains on levophed- weak today  11/20- still with AMS- worse today, off levophed, went for CT head- neg  11/21  encephalopathic.  AFVSS  11/22 remains encephalopathic.  Gross hematuria noted.   Off vasopressors     Plan of Care:    Assessment:  Ischemic colitis s/p laprotomy with total colectomy and ileostomy, 11/17  ESRD on HD MWF  Septic shock  Hyponatremia--better  Hypokalemia--better  Lactic acidosis--resoved  HyperMg  SHPT  Anemia of CKD  AMS    Plan:    - off pressors- continue lee per urology- dose antbx for CrCl < 10/HD  - continue HD MWF  - continue midodrine  - UF even  - consulted RD for customized TPN- increase K and phos, remove Mg  - adjust dialysate  - BMM reviewed- no binder needs  - transfused 1u on 11/19- Hb is better- continue ADAM with HD  - CT head neg, ammonia and LFTs normal, lactate normal, WBC  count improving-    --hg dropped.  Has gross hematuria.  Check cbc q 12 hours.  Dialysis with pRBC today  --renal us      Thank you for allowing us to participate in this patient's care. We will continue to follow.    Vital Signs:  Temp Readings from Last 3 Encounters:   11/22/22 97.4 °F (36.3 °C) (Axillary)   10/29/22 98.5 °F (36.9 °C) (Oral)   09/27/22 97.4 °F (36.3 °C)       Pulse Readings from Last 3 Encounters:   11/22/22 91   10/29/22 91   09/27/22 74       BP Readings from Last 3 Encounters:   11/22/22 (!) 178/70   10/29/22 (!) 105/58   09/27/22 101/61       Weight:  Wt Readings from Last 3 Encounters:   11/17/22 47.2 kg (104 lb 0.9 oz)   11/17/22 47.2 kg (104 lb)   10/29/22 50.8 kg (112 lb)       Medications:  Scheduled Meds:   epoetin nica-ebpx (RETACRIT) injection  100 Units/kg Intravenous Every Mon, Wed, Fri    levothyroxine  50 mcg Intravenous Q24H    midodrine  5 mg Oral TID    valproate sodium (DEPACON) IVPB  250 mg Intravenous Q8H     Continuous Infusions:   NORepinephrine bitartrate-D5W Stopped (11/22/22 0848)    TPN ADULT CENTRAL LINE CUSTOM (3 in 1) 50 mL/hr at 11/21/22 1715     PRN Meds:.sodium chloride, acetaminophen, calcium gluconate IVPB, calcium gluconate IVPB, calcium gluconate IVPB, dextrose 10%, dextrose 10%, glucagon (human recombinant), glucose, glucose, HYDROcodone-acetaminophen, HYDROmorphone, LIDOcaine HCl 2%, magnesium sulfate IVPB, magnesium sulfate IVPB, naloxone, ondansetron, potassium chloride in water **AND** potassium chloride in water **AND** potassium chloride in water, prochlorperazine, senna-docusate 8.6-50 mg, sodium chloride 0.9%, sodium phosphate IVPB, sodium phosphate IVPB, sodium phosphate IVPB  Current Facility-Administered Medications on File Prior to Encounter   Medication Dose Route Frequency Provider Last Rate Last Admin    electrolyte-S (ISOLYTE)   Intravenous Continuous Raz Ruff MD        lactated ringers infusion   Intravenous Continuous Ruy JUNG  West, MD   New Bag at 11/16/22 7624     Current Outpatient Medications on File Prior to Encounter   Medication Sig Dispense Refill    cholecalciferol, vitamin D3, (VITAMIN D3) 25 mcg (1,000 unit) capsule Take 1 capsule (1,000 Units total) by mouth once daily. 10 capsule 0    dronabinoL (MARINOL) 2.5 MG capsule Take 2.5 mg by mouth 2 (two) times daily before meals.      ergocalciferol (ERGOCALCIFEROL) 50,000 unit Cap Take 50,000 Units by mouth every 7 days. MONDAYS      ferric citrate (AURYXIA) 210 mg iron Tab Take 2 tablets by mouth 3 (three) times daily with meals.      gabapentin (NEURONTIN) 100 MG capsule Take 100 mg by mouth 2 (two) times daily.      hydrALAZINE (APRESOLINE) 50 MG tablet Take 1 tablet (50 mg total) by mouth every 8 (eight) hours. 270 tablet 0    hydrOXYzine HCL (ATARAX) 25 MG tablet Take 1 tablet (25 mg total) by mouth 3 (three) times daily as needed for Itching.      levothyroxine (SYNTHROID) 88 MCG tablet TAKE 1 TABLET BEFORE BREAKFAST (Patient taking differently: Take 88 mcg by mouth before breakfast. TAKE 1 TABLET BEFORE BREAKFAST) 90 tablet 3    LORazepam (ATIVAN) 0.5 MG tablet Take 1 tablet (0.5 mg total) by mouth every evening. 90 tablet 1    midodrine (PROAMATINE) 5 MG Tab Take 5 mg by mouth 3 (three) times daily.      mirtazapine (REMERON) 15 MG tablet Take 7.5 mg by mouth every evening.      promethazine (PHENERGAN) 25 MG tablet TAKE 1 TABLET (25 MG TOTAL) BY MOUTH 2 (TWO) TIMES DAILY AS NEEDED FOR NAUSEA. 60 tablet 0    rOPINIRole (REQUIP) 0.5 MG tablet Take 0.5 mg by mouth every 8 (eight) hours.      sertraline (ZOLOFT) 50 MG tablet Take 1 tablet (50 mg total) by mouth every evening. Take 50 mg by mouth every evening. 90 tablet 1    simethicone (MYLICON) 80 MG chewable tablet Take 80 mg by mouth every 6 (six) hours as needed.      traMADoL (ULTRAM) 50 mg tablet Take 1 tablet by mouth 4 (four) times daily as needed.      zinc gluconate 50 mg tablet Take 50 mg by mouth once daily.       cholestyramine (QUESTRAN) 4 gram packet Take 4 g by mouth 2 (two) times daily.      diltiaZEM (CARDIZEM CD) 120 MG Cp24 Take 1 capsule (120 mg total) by mouth once daily. 90 capsule 3    ferrous sulfate (FEOSOL) 325 mg (65 mg iron) Tab tablet 325 mg.      oxyCODONE (OXY-IR) 5 mg Cap Take 5 mg by mouth.      oxyCODONE-acetaminophen (PERCOCET) 5-325 mg per tablet Take 1 tablet by mouth every 6 (six) hours as needed for Pain. 12 tablet 0    sevelamer carbonate (RENVELA) 800 mg Tab 800 mg.      zinc sulfate (ZINCATE) 50 mg zinc (220 mg) capsule Take 1 capsule by mouth once daily.      [DISCONTINUED] miconazole (MICOTIN) 2 % cream Apply topically 2 (two) times daily. 28 g 0     Review of Systems:  AMS    Physical Exam:  Constitutional: nad, aao x 0, nonverbal, grunting some  Heart: rrr, no m/r/g, wwp, no edema  Lungs: ctab, no w/r/r/c, no lb  Abdomen: s/nt/nd, +BS      Results:  Lab Results   Component Value Date     11/22/2022    K 4.2 11/22/2022     11/22/2022    CO2 26 11/22/2022    BUN 30 (H) 11/22/2022    CREATININE 3.2 (H) 11/22/2022    CALCIUM 7.9 (L) 11/22/2022    ANIONGAP 5 (L) 11/22/2022    ESTGFRAFRICA 12.5 (A) 07/20/2022    EGFRNONAA 10.9 (A) 07/20/2022       Lab Results   Component Value Date    CALCIUM 7.9 (L) 11/22/2022    PHOS 3.4 11/22/2022       Recent Labs   Lab 11/22/22  0346   WBC 13.13*   RBC 2.36*   HGB 7.6*   HCT 23.4*   PLT 83*   MCV 99*   MCH 32.2*   MCHC 32.5         I have personally reviewed pertinent radiological imaging and reports.    I have spent > 35 minutes providing care for this patient for the above diagnoses. These services have included chart/data/imaging review, evaluation, exam, formulation of plan, , note preparation, and discussions with staff involved in this patient's care.    Raúl Dietz MD  Nephrology  [facility]  [phone]

## 2022-11-22 NOTE — PT/OT/SLP PROGRESS
Occupational Therapy      Patient Name:  Shara Hutchins   MRN:  [id number]    Patient not seen today secondary to nurse hold; patient is lethargic and not following commands. Will follow-up next scheduled service date.    11/22/2022

## 2022-11-22 NOTE — PROGRESS NOTES
HD tx tolerated fair    2 units PRBC administered  Net  mL       11/22/22 1600   Handoff Report   Received From Kaylee Lu   Given To Luba   Vital Signs   Temp 98.2 °F (36.8 °C)   Temp src Axillary   Pulse 91   Resp (!) 23   SpO2 (!) 94 %   BP (!) 134/53   MAP (mmHg) 76   Art Line   Arterial Line /50   Arterial Line MAP (mmHg) 80 mmHg   Assessments (Pre/Post)   Blood Liters Processed (BLP) 53   Transport Modality not applicable   Level of Consciousness (AVPU) responds to voice   Dialyzer Clearance mildly streaked        Hemodialysis AV Fistula Left upper arm   No Placement Date or Time found.   Present Prior to Hospital Arrival?: Yes  Location: Left upper arm   Site Assessment Clean;Dry;Intact   Patency Present;Thrill;Bruit   Status Deaccessed   Flows Good   Dressing Intervention First dressing   Dressing Status Clean;Dry;Intact   Site Condition No complications   Dressing Gauze   Post-Hemodialysis Assessment   Rinseback Volume (mL) 250 mL   Blood Volume Processed (Liters) 53 L   Dialyzer Clearance Lightly streaked   Duration of Treatment 180 minutes   Additional Fluid Intake (mL) 500 mL   Total UF (mL) 2000 mL   Patient Response to Treatment tolerated   Arterial bleeding stop time (min) 7 min   Venous bleeding stop time (min) 7 min   Post-Hemodialysis Comments Tx complete, all blood reinfused per protocol

## 2022-11-22 NOTE — SUBJECTIVE & OBJECTIVE
Interval History:   Called due to worsening hematuria   Urine had been clearing however today had bright red blood in lee  Per nursing staff has been clearing.   She is still making minimal urine overall    Review of Systems  Objective:     Temp:  [97.4 °F (36.3 °C)-98.8 °F (37.1 °C)] 98.2 °F (36.8 °C)  Pulse:  [] 91  Resp:  [13-27] 23  SpO2:  [88 %-99 %] 94 %  BP: ()/(39-73) 134/53  Arterial Line BP: (101-172)/(39-69) 132/50     Body mass index is 16.3 kg/m².    Date 11/22/22 0700 - 11/23/22 0659   Shift 0537-6449 0928-3595 8302-4551 24 Hour Total   INTAKE   Blood 664   664   Other  500  500   Shift Total(mL/kg) 664(14.1) 500(10.6)  1164(24.7)   OUTPUT   Urine(mL/kg/hr) 135(0.4)   135   Other  2000 2000   Shift Total(mL/kg) 135(2.9) 2000(42.4)  2135(45.2)   Weight (kg) 47.2 47.2 47.2 47.2          Drains       Drain  Duration                  Hemodialysis AV Fistula Left upper arm -- days         Ureteral Drain/Stent 08/30/22 Right ureter 7 Fr. 84 days         Ileostomy 11/17/22 0100 RLQ 5 days         Urethral Catheter 11/17/22 5 days                    Physical Exam  Vitals reviewed.   Constitutional:       General: She is not in acute distress.     Appearance: Normal appearance. She is ill-appearing. She is not toxic-appearing or diaphoretic.   HENT:      Head: Normocephalic and atraumatic.   Eyes:      General: No scleral icterus.  Cardiovascular:      Rate and Rhythm: Normal rate and regular rhythm.   Pulmonary:      Effort: Pulmonary effort is normal. No respiratory distress.   Abdominal:      General: There is distension.   Genitourinary:     Comments: Lee with minimal bloody urine   Neurological:      Comments: Minimally responsive       Significant Labs:    BMP:  Recent Labs   Lab 11/20/22  0414 11/21/22  0409 11/22/22  0346   * 137 137   K 3.8 4.0 4.2    108 106   CO2 22* 22* 26   BUN 22 32* 30*   CREATININE 3.5* 4.3* 3.2*   CALCIUM 7.9* 8.2* 7.9*       CBC:   Recent Labs    Lab 11/20/22  0414 11/21/22  0409 11/22/22  0346   WBC 8.63 10.04 13.13*   HGB 8.6* 8.7* 7.6*   HCT 26.6* 27.0* 23.4*   PLT 67* 76* 83*       Urine Culture:   Recent Labs   Lab 11/16/22 2111 11/20/22  1501   LABURIN Multiple organisms isolated. None in predominance.  Repeat if  clinically necessary. PRESUMPTIVE PATRICIA ALBICANS  50,000 - 99,999 cfu/ml  *     Urine Studies:   Recent Labs   Lab 11/16/22 2111 11/20/22  1501   COLORU Yellow Na   APPEARANCEUA Cloudy* Cloudy*   PHUR 8.0 7.0   SPECGRAV 1.010 1.010   PROTEINUA 3+* 2+*   GLUCUA Negative Negative   KETONESU Negative Negative   BILIRUBINUA Negative Negative   OCCULTUA 2+* 3+*   NITRITE Negative Negative   UROBILINOGEN Negative Negative   LEUKOCYTESUR 3+* 3+*   RBCUA 1 >100*   WBCUA >100* >100*   BACTERIA Negative Negative   SQUAMEPITHEL 0 0   HYALINECASTS 2* 5*       Significant Imaging:  All pertinent imaging results/findings from the past 24 hours have been reviewed.

## 2022-11-22 NOTE — PROGRESS NOTES
[facility]  Department of Neurology  Neurology Progress Note        PATIENT NAME: Shara Hutchins  MRN: [id number]  CSN: [id number]      TODAY'S DATE: 11/22/2022  ADMIT DATE: 11/16/2022                            CONSULTING PROVIDER: Aileen Sims MD  CONSULT REQUESTED BY: No att. providers found      Reason for consult:  Encephalopathy        History obtained from chart review.    HPI per EMR: Shara Hutchins is a 72 y.o. female who was initially brought to the ED initially on 11/16 via EMS from outpatient dialysis center due to hypotension. .  Recently diagnosed with persistent hypotension for which she was started on midodrine.  Known history of ESRD on HD MWF.  She states blood pressure has been running low since previous admission, yesterday midodrine was increased from 5 mg t.i.d. to 10 mg t.i.d..  States her appetite has been low, over the last 24 hours has noticed decreased output from her colostomy, chronic nausea without any recent change, no significant increased abdominal pain.  States at dialysis today, near end of session, needle got dislodged from fistula, about 300 cc of blood loss, states following this she became lightheaded/dizzy, blood pressure 60/30 and she was referred to the ED.    Admitted to the ICU, received fluid bolus, subsequently started on Levophed    Of note, Patient with complicated recent history, about 2 months ago, admitted with ischemic colitis status post laparotomy with colostomy, stated she stayed in the hospital for about 1 month, she was discharge but had fall at assisted living facility and re-presented to the hospital following day, since then has had persistent hypotension, antihypertensives were discontinued, she was started on midodrine.    CT abdomen and pelvis obtained with diffuse pneumatosis intestinal most prominent ascending, transverse and descending colon with portal vein gas.  She was started on broad-spectrum antibiotics.  General surgery was  consulted and underwent emergent exploratory laparotomy with total colectomy, creation of ileostomy, inadvertent bladder injury requiring repair.  She was extubated postprocedure and transferred to the ICU.  Received 1 unit PRBC and FFP.  On 11/17 reports nausea, abdominal pain, still on Levophed.  11/18 still with significant abdominal pain, no output from ileostomy, receiving dialysis today, running even, still on Levophed, resuming home midodrine and attempting to wean off.  On 11/20, received unit of PRBC, encephalopathy persists, CT head negative encephalopathy labs ordered, having output from ileostomy and advanced to clear liquid diet.  Urine output from Godwin is now yellow.  On 11/21, encephalopathy persists, exam is nonfocal, having output from ileostomy, not safe for oral intake, on TPN.    Neurology consult:  Patient was seen and examined by me.  She is awake, tracking however she does not answer or follow commands.  Apparently patient was in her normal mental status prior to surgery and since her surgery, she has been encephalopathic, not answering questions or following commands.  She moves all her extremities symmetrically antigravity.  Patient is on TPN    11/22/22: Patient was seen and examined by me today. Her mental status is unchanged, but she seems non focal on exam. No acute events reported overnight.      CURRENT SCHEDULED MEDICATIONS:   epoetin nica-ebpx (RETACRIT) injection  100 Units/kg Intravenous Every Mon, Wed, Fri    levothyroxine  50 mcg Intravenous Q24H    midodrine  5 mg Oral TID    valproate sodium (DEPACON) IVPB  250 mg Intravenous Q8H     CURRENT INFUSIONS:   NORepinephrine bitartrate-D5W 0.02 mcg/kg/min (11/22/22 8478)    TPN ADULT CENTRAL LINE CUSTOM (3 in 1) 50 mL/hr at 11/21/22 1715     CURRENT PRN MEDICATIONS:  sodium chloride, acetaminophen, calcium gluconate IVPB, calcium gluconate IVPB, calcium gluconate IVPB, dextrose 10%, dextrose 10%, glucagon (human recombinant),  glucose, glucose, HYDROcodone-acetaminophen, HYDROmorphone, LIDOcaine HCl 2%, magnesium sulfate IVPB, magnesium sulfate IVPB, naloxone, ondansetron, potassium chloride in water **AND** potassium chloride in water **AND** potassium chloride in water, prochlorperazine, senna-docusate 8.6-50 mg, sodium chloride 0.9%, sodium phosphate IVPB, sodium phosphate IVPB, sodium phosphate IVPB    REVIEW OF SYSTEMS:  A review of systems cannot be obtained as the patient is unable to respond to questions appropriately.       PHYSICAL EXAM:  Patient Vitals for the past 24 hrs:   BP Temp Temp src Pulse Resp SpO2   11/22/22 0545 -- -- -- 95 19 (!) 93 %   11/22/22 0530 -- -- -- 75 15 96 %   11/22/22 0515 -- -- -- 65 14 95 %   11/22/22 0500 (!) 82/47 98.8 °F (37.1 °C) Axillary 66 14 95 %   11/22/22 0445 -- -- -- 67 15 (!) 94 %   11/22/22 0430 -- -- -- 71 15 96 %   11/22/22 0415 -- -- -- 67 15 (!) 93 %   11/22/22 0400 (!) 86/49 -- -- 71 16 (!) 94 %   11/22/22 0345 -- -- -- 93 18 98 %   11/22/22 0330 -- -- -- 64 13 97 %   11/22/22 0315 -- -- -- 61 14 96 %   11/22/22 0301 (!) 87/47 -- -- 64 14 95 %   11/22/22 0300 (!) 87/47 -- -- 63 15 95 %   11/22/22 0245 -- -- -- 76 15 95 %   11/22/22 0230 -- -- -- 75 19 (!) 91 %   11/22/22 0215 -- -- -- 90 (!) 24 (!) 90 %   11/22/22 0200 117/62 -- -- 85 (!) 26 96 %   11/22/22 0145 -- -- -- 72 (!) 22 98 %   11/22/22 0144 -- -- -- 78 (!) 24 99 %   11/21/22 2345 (!) 97/46 -- -- 86 (!) 21 (!) 92 %   11/21/22 2330 (!) 108/53 -- -- 88 (!) 21 (!) 93 %   11/21/22 2315 (!) 139/45 98.8 °F (37.1 °C) Axillary 82 (!) 23 (!) 94 %   11/21/22 2300 -- -- -- 82 (!) 23 95 %   11/21/22 2245 -- -- -- 78 (!) 21 (!) 94 %   11/21/22 2230 -- -- -- 89 (!) 24 (!) 93 %   11/21/22 2215 -- -- -- 80 (!) 23 (!) 94 %   11/21/22 2200 -- -- -- 84 (!) 22 96 %   11/21/22 2145 -- -- -- 88 (!) 23 (!) 93 %   11/21/22 2130 -- -- -- 82 (!) 22 96 %   11/21/22 2115 -- -- -- 83 (!) 23 (!) 93 %   11/21/22 2101 -- -- -- 85 (!) 24 (!) 94 %    11/21/22 2100 -- -- -- 87 (!) 23 (!) 94 %   11/21/22 2045 -- -- -- 88 (!) 22 (!) 93 %   11/21/22 2030 -- -- -- 85 (!) 24 95 %   11/21/22 2015 -- -- -- 87 (!) 22 95 %   11/21/22 2000 -- -- -- 90 (!) 27 95 %   11/21/22 1945 -- -- -- 89 (!) 23 (!) 94 %   11/21/22 1930 -- -- -- 90 (!) 22 95 %   11/21/22 1915 -- -- -- 87 (!) 22 95 %   11/21/22 1901 (!) 134/44 98.8 °F (37.1 °C) Axillary -- -- --   11/21/22 1900 (!) 134/45 -- -- 88 (!) 22 95 %   11/21/22 1800 (!) 130/43 -- -- 81 (!) 21 96 %   11/21/22 1700 (!) 125/43 -- -- 84 (!) 21 97 %   11/21/22 1600 (!) 98/52 97.1 °F (36.2 °C) Axillary 85 (!) 21 97 %   11/21/22 1500 (!) 83/35 -- -- 93 (!) 23 (!) 94 %   11/21/22 1415 -- 97 °F (36.1 °C) Axillary 102 (!) 26 (!) 92 %   11/21/22 1410 (!) 129/55 -- -- 109 (!) 24 (!) 93 %   11/21/22 1400 (!) 135/57 -- -- (!) 111 (!) 27 (!) 94 %   11/21/22 1345 (!) 84/49 -- -- 94 (!) 29 96 %   11/21/22 1330 (!) 103/47 -- -- 97 (!) 24 95 %   11/21/22 1315 (!) 110/45 -- -- 82 (!) 25 96 %   11/21/22 1300 (!) 110/44 -- -- 95 (!) 24 (!) 93 %   11/21/22 1245 (!) 91/41 -- -- 89 (!) 23 (!) 93 %   11/21/22 1230 (!) 103/38 -- -- 84 (!) 21 95 %   11/21/22 1215 (!) 103/41 96.8 °F (36 °C) Axillary 82 20 (!) 94 %   11/21/22 1200 (!) 102/42 -- -- 82 19 (!) 92 %   11/21/22 1145 (!) 108/41 -- -- 82 (!) 21 (!) 91 %   11/21/22 1130 (!) 111/42 -- -- 83 20 (!) 87 %   11/21/22 1115 (!) 115/44 -- -- 83 16 (!) 92 %   11/21/22 1110 (!) 117/41 -- -- 82 -- --   11/21/22 1105 119/71 97.8 °F (36.6 °C) Axillary 80 16 (!) 94 %   11/21/22 0910 -- -- -- 79 17 (!) 93 %   11/21/22 0900 (!) 149/48 -- -- 69 15 (!) 93 %         GENERAL APPEARANCE: Alert, well-developed, well-nourished female in no acute distress.  HEENT: Normocephalic and atraumatic. PERRL. Oropharynx unremarkable.  PULM: Normal respiratory effort. No accessory muscle use.  CV: RRR.  ABDOMEN: Soft, nontender.  EXTREMITIES: No obvious signs of vascular compromise. Pulses present. No cyanosis, clubbing or  edema.  SKIN: Clear; no rashes, lesions or skin breaks in exposed areas.    NEURO:  MENTAL STATUS:  Patient is alert, able to track however not following commands or answering questions.  She does not talk however she moves all extremities symmetrically antigravity. .  Affect labile.    CRANIAL NERVES:  Pupils equal round reactive to light, face is grossly symmetric.  Rest of cranial nerves can not be assessed due to mental status.  MOTOR:  Bulk normal. Tone normal and symmetric throughout.  Abnormal movements absent.  Tremor: none present.  Strength  antigravity strength in all extremities which is symmetrical.  Can not assess exact strength due to mental status .    REFLEXES:  DTRs 1+ throughout.  Plantar response equivocal bilaterally.  SENSATION: not tested.  COORDINATION:  Not tested due to mental status .  STATION: not tested.  GAIT: not tested.    Labs:  Recent Labs   Lab 11/20/22 0414 11/21/22 0409 11/22/22 0346   * 137 137   K 3.8 4.0 4.2    108 106   CO2 22* 22* 26   BUN 22 32* 30*   CREATININE 3.5* 4.3* 3.2*   * 148* 184*   CALCIUM 7.9* 8.2* 7.9*   PHOS 2.0* 2.4* 3.4   MG 3.0* 3.0* 2.3       Recent Labs   Lab 11/20/22 0414 11/21/22 0409 11/22/22  0346   WBC 8.63 10.04 13.13*   HGB 8.6* 8.7* 7.6*   HCT 26.6* 27.0* 23.4*   PLT 67* 76* 83*       Recent Labs   Lab 11/20/22 0414 11/21/22 0409 11/22/22  0346   ALBUMIN 1.6* 1.5* 1.5*   PROT 4.2* 4.1* 4.2*   BILITOT 4.3* 3.3* 2.3*   ALKPHOS 355* 454* 416*   ALT 27 26 20   AST 36 27 21       Lab Results   Component Value Date    INR 1.7 11/22/2022     Lab Results   Component Value Date    TRIG 96 11/18/2022    HDL 57 05/25/2021    CHOLHDL 60.0 (H) 05/25/2021     Lab Results   Component Value Date    HGBA1C 4.6 05/25/2021     No results found for: PROTEINCSF, GLUCCSF, WBCCSF    Imaging:  I have reviewed and interpreted the pertinent imaging and lab results.      X-Ray Abdomen AP 1 View  Reason: abdominal surgery abdominal  surgery    FINDINGS:  Supine abdomen compared with 11/16/2022 CT shows nonspecific lucency in the upper abdomen which could be artifactual in nature or represent expected postoperative free intraperitoneal gas. Bowel gas pattern is nonobstructive. Right upper quadrant surgical clips indicate cholecystectomy. Right ureteral stent remains in place. Bilateral medullary nephrocalcinosis is more conspicuous in the left kidney, unchanged. Diffuse arterial vascular calcifications are present. Godwin catheter tip projects in pelvis.    Partially visualized lung bases are clear. Convex left lumbar spine curvature is evident.    IMPRESSION:  Nonobstructive bowel gas pattern with nonspecific lucency in upper abdomen as discussed above, and additional findings which are unchanged.    Electronically signed by:  Wellington Epperson MD  11/21/2022 9:30 AM CST Workstation: [id number]  X-Ray Chest AP Portable  Reason: AMS    FINDINGS:    Portable chest comparison chest x-ray November 17, 2022 show borderline enlarged cardiomediastinal silhouette. Left internal jugular central venous catheter is noted.  There is interval development of bilateral mixed interstitial and airspace lung opacities, worst in the left upper lung. There is prominence of the central hilar vascular structures. No acute osseous abnormality.    IMPRESSION:    Borderline enlarged cardiomediastinal silhouette with prominence of the central vascular structures interval development of bilateral mixed interstitial and airspace lung opacities, worst in the left lung. Findings could reflect CHF with pulmonary edema. Correlate.    Electronically signed by:  Elias Sherwood DO  11/21/2022 9:27 AM CST Workstation: [id number]         ASSESSMENT & PLAN:    Encephalopathy  Ischemic Colitis       Plan:   Etiology of encephalopathy is unknown and could likely be infectious/metabolic encephalopathy. Concern for primary neurological cause for encephalopathy is less likely. Cannot rule  out psychiatric etiology.  EEG showed no evidence of seizures. Triphasic waves are indicative of metabolic encephalopathy  Check reversible causes of encephalopathy including including Vitamin b12 (normal), folate (normal), normal Ammonia, normal TSH.   CTH: No acute intracranial abnormality.  Neuro checks per unit protocol   Started on Depacon 250mg TID for mood.   Management of metabolic and infectious abnormalities per primary team.   Will order MRI to assess for structural abnormalities  Will follow         Thank you kindly for including us in the care of this patient. Please do not hesitate to contact us with any questions.      45 minutes of care time has been spent evaluating with the patient. Time includes chart review not limited to diagnostic imaging, labs, and vitals, patient assessment, discussion with family and nursing, current order evaluations, and new order entries.       Aileen Sims MD  Neurology/vascular Neurology  Date of Service: 11/22/2022

## 2022-11-23 PROBLEM — I47.29 OTHER VENTRICULAR TACHYCARDIA: Status: ACTIVE | Noted: 2022-01-01

## 2022-11-23 NOTE — PROCEDURES
EEG REPORT    NAME: Shara Hutchins  : 1950  MRN: [id number]    DATE of EE2022    CLINICAL INDICATION: This is a 72 y.o. female with history of ischemic colitis s/p colectomy and hypotension being evaluated for encephalopathy.    MEDICATIONS:  Scheduled Meds:   epoetin nica-ebpx (RETACRIT) injection  100 Units/kg Intravenous Every Mon, Wed, Fri    levothyroxine  50 mcg Intravenous Q24H    midodrine  5 mg Oral TID    valproate sodium (DEPACON) IVPB  250 mg Intravenous Q8H     Continuous Infusions:   NORepinephrine bitartrate-D5W Stopped (22 0848)    TPN ADULT CENTRAL LINE CUSTOM (3 in 1) 50 mL/hr at 22 1806     PRN Meds:.sodium chloride, acetaminophen, calcium gluconate IVPB, calcium gluconate IVPB, calcium gluconate IVPB, dextrose 10%, dextrose 10%, glucagon (human recombinant), glucose, glucose, HYDROcodone-acetaminophen, HYDROmorphone, LIDOcaine HCl 2%, magnesium sulfate IVPB, magnesium sulfate IVPB, naloxone, ondansetron, potassium chloride in water **AND** potassium chloride in water **AND** potassium chloride in water, prochlorperazine, senna-docusate 8.6-50 mg, sodium chloride 0.9%, sodium phosphate IVPB, sodium phosphate IVPB, sodium phosphate IVPB      EEG DESCRIPTION:  A 16-channel EEG was performed with electrode placement in 10/20 [employer] System. Longitudinal bipolar and referential montages were utilized in analysis. Total duration of this study was 26 minutes.    This is a technically difficult study with major muscle and motion artifacts, with muscle artifact obscuring the majority of the study.     The visible background consists of a mixture of theta and delta rhythms, with no posterior dominant rhythm seen. Stage II sleep structures are not seen. There are frequent sharp waves with triphasic morphology.    Photic stimulation and hyperventilation were not performed.     No epileptiform discharges or seizures are captured.    Impression:  This is an abnormal EEG due to  diffuse slowing of the background activity consistent with a moderate to severe encephalopathy. Triphasic waves indicative of metabolic encephalopathy in patient with history of renal disease. No epileptiform discharges or seizures are captured. However, consider repeating study in the setting of excessive muscle artifact that can affect study quality.  Findings of this study indicate a generalized encephalopathic process that is nonspecific in type. Toxic, metabolic, or structural abnormalities may be considered.  Further clinical correlation is advised.      Aileen Sims MD  Neurology

## 2022-11-23 NOTE — PROGRESS NOTES
INPATIENT NEPHROLOGY Progress Notes  [facility]    Patient Name: Shara Hutchins  Date: 11/23/2022    Reason for consultation: ESRD    Chief Complaint:   Chief Complaint   Patient presents with    Hypotension     Pt presents to the ER via EMS after being hypotensive at dialysis. Pt had systolic BP of 60 at dialysis.        History of Present Illness:  Ms. Hutchins is a 72-year-old female who was brought to the ED via EMS from outpatient dialysis center due to hypotension.  Patient with complicated recent history, about 2 months ago, admitted with ischemic colitis status post laparotomy with colostomy, stated she stayed in the hospital for about 1 month, she was discharge but had fall at assisted living facility and re-presented to the hospital following day, since then has had persistent hypotension, antihypertensives were discontinued, she was started on midodrine.  She was subsequently discharged to inpatient rehab and states she had returned to assisted living facility about 2 weeks ago.  Known history of ESRD on HD MWF.  She states blood pressure has been running low since previous admission, yesterday midodrine was increased from 5 mg t.i.d. to 10 mg t.i.d.. States her appetite has been low, over the last 24 hours has noticed decreased output from her colostomy, chronic nausea without any recent change, no significant increased abdominal pain.  States at dialysis today, near end of session, needle got dislodged from fistula, about 300 cc of blood loss, states following this she became lightheaded/dizzy, blood pressure 60/30 and she was referred to the ED.  States with dialysis recently they have not been removing fluid due to hypotension.  States she does have home health at assisted living facility.  In the ED blood pressure was low, 94/56, received fluid bolus, subsequently started on Levophed, during my encounter blood pressure 116/57 on Levophed 0.3 mcg/kg/min.  Patient is currently reporting  back pain.  Labs with WBC 11.36, hemoglobin 8.6, platelet 162, potassium 1.9, magnesium 1.2, albumin 2.2, lactic acid 2.2, BUN/creatinine 5/2.4.  EKG reviewed.  Chest x-ray clear.  Previous echo with EF of 70%.  She received acetaminophen, magnesium, potassium, 472 cc fluid bolus as well as cefepime in the ED. She was started on Levophed.  CTA abdomen and pelvis obtained with diffuse pneumatosis intestinal most prominent ascending, transverse and descending colon with portal vein gas.  She was started on broad-spectrum antibiotics.  General surgery was consulted and underwent emergent exploratory laparotomy with total colectomy, creation of ileostomy, inadvertent bladder injury requiring repair.  She was extubated postprocedure and transferred to the ICU.  Received 1 unit PRBC and FFP.  On 11/17 reports nausea, abdominal pain, still on Levophed. We are consulted for dialysis.    Interval History:  11/17- weaning pressors, stopped IVFs, can remove lee if ok with urology  11/18- still on levophed- reviewed urology note regarding lee- seen on HD- UF even  11/19- worsening mental status this AM- remains on levophed- weak today  11/20- still with AMS- worse today, off levophed, went for CT head- neg  11/21  encephalopathic.  AFVSS  11/22 remains encephalopathic.  Gross hematuria noted.   Off vasopressors   11/23  AFVSS.  S/P pRBC.  Encephalopathic     Plan of Care:    Assessment:  Ischemic colitis s/p laprotomy with total colectomy and ileostomy, 11/17  ESRD on HD MWF  Septic shock  Hyponatremia--better  Hypokalemia--better  Lactic acidosis--resoved  HyperMg  SHPT  Anemia of CKD  AMS    Plan:    - off pressors- continue lee per urology- dose antbx for CrCl < 10/HD  --Lee irrigation per Urology recommendation   - continue HD MWF  - continue midodrine  - UF even  - consulted RD for customized TPN- increase K and phos, remove Mg  - adjust dialysate  - BMM reviewed- no binder needs  - transfused 1u on 11/19- Hb is  better- continue ADAM with HD.  2 units pRBC transfused on 11/22 with supplemental dialysis  - CT head neg, ammonia and LFTs normal, lactate normal, WBC count improving-                Thank you for allowing us to participate in this patient's care. We will continue to follow.    Vital Signs:  Temp Readings from Last 3 Encounters:   11/23/22 98 °F (36.7 °C) (Axillary)   10/29/22 98.5 °F (36.9 °C) (Oral)   09/27/22 97.4 °F (36.3 °C)       Pulse Readings from Last 3 Encounters:   11/23/22 99   10/29/22 91   09/27/22 74       BP Readings from Last 3 Encounters:   11/23/22 (!) 147/64   10/29/22 (!) 105/58   09/27/22 101/61       Weight:  Wt Readings from Last 3 Encounters:   11/17/22 47.2 kg (104 lb 0.9 oz)   11/17/22 47.2 kg (104 lb)   10/29/22 50.8 kg (112 lb)       Medications:  Scheduled Meds:   epoetin nica-ebpx (RETACRIT) injection  100 Units/kg Intravenous Every Mon, Wed, Fri    levothyroxine  50 mcg Intravenous Q24H    midodrine  5 mg Oral TID    valproate sodium (DEPACON) IVPB  250 mg Intravenous Q8H     Continuous Infusions:   NORepinephrine bitartrate-D5W Stopped (11/22/22 0848)    TPN ADULT CENTRAL LINE CUSTOM (3 in 1) 50 mL/hr at 11/22/22 1806     PRN Meds:.sodium chloride, acetaminophen, calcium gluconate IVPB, calcium gluconate IVPB, calcium gluconate IVPB, dextrose 10%, dextrose 10%, glucagon (human recombinant), glucose, glucose, HYDROcodone-acetaminophen, HYDROmorphone, LIDOcaine HCl 2%, magnesium sulfate IVPB, magnesium sulfate IVPB, naloxone, ondansetron, potassium chloride in water **AND** potassium chloride in water **AND** potassium chloride in water, prochlorperazine, senna-docusate 8.6-50 mg, sodium chloride 0.9%, sodium phosphate IVPB, sodium phosphate IVPB, sodium phosphate IVPB  Current Facility-Administered Medications on File Prior to Encounter   Medication Dose Route Frequency Provider Last Rate Last Admin    electrolyte-S (ISOLYTE)   Intravenous Continuous Raz Ruff MD         lactated ringers infusion   Intravenous Continuous Ruy Beltre MD   New Bag at 11/16/22 2885     Current Outpatient Medications on File Prior to Encounter   Medication Sig Dispense Refill    cholecalciferol, vitamin D3, (VITAMIN D3) 25 mcg (1,000 unit) capsule Take 1 capsule (1,000 Units total) by mouth once daily. 10 capsule 0    dronabinoL (MARINOL) 2.5 MG capsule Take 2.5 mg by mouth 2 (two) times daily before meals.      ergocalciferol (ERGOCALCIFEROL) 50,000 unit Cap Take 50,000 Units by mouth every 7 days. MONDAYS      ferric citrate (AURYXIA) 210 mg iron Tab Take 2 tablets by mouth 3 (three) times daily with meals.      gabapentin (NEURONTIN) 100 MG capsule Take 100 mg by mouth 2 (two) times daily.      hydrALAZINE (APRESOLINE) 50 MG tablet Take 1 tablet (50 mg total) by mouth every 8 (eight) hours. 270 tablet 0    hydrOXYzine HCL (ATARAX) 25 MG tablet Take 1 tablet (25 mg total) by mouth 3 (three) times daily as needed for Itching.      levothyroxine (SYNTHROID) 88 MCG tablet TAKE 1 TABLET BEFORE BREAKFAST (Patient taking differently: Take 88 mcg by mouth before breakfast. TAKE 1 TABLET BEFORE BREAKFAST) 90 tablet 3    LORazepam (ATIVAN) 0.5 MG tablet Take 1 tablet (0.5 mg total) by mouth every evening. 90 tablet 1    midodrine (PROAMATINE) 5 MG Tab Take 5 mg by mouth 3 (three) times daily.      mirtazapine (REMERON) 15 MG tablet Take 7.5 mg by mouth every evening.      promethazine (PHENERGAN) 25 MG tablet TAKE 1 TABLET (25 MG TOTAL) BY MOUTH 2 (TWO) TIMES DAILY AS NEEDED FOR NAUSEA. 60 tablet 0    rOPINIRole (REQUIP) 0.5 MG tablet Take 0.5 mg by mouth every 8 (eight) hours.      sertraline (ZOLOFT) 50 MG tablet Take 1 tablet (50 mg total) by mouth every evening. Take 50 mg by mouth every evening. 90 tablet 1    simethicone (MYLICON) 80 MG chewable tablet Take 80 mg by mouth every 6 (six) hours as needed.      traMADoL (ULTRAM) 50 mg tablet Take 1 tablet by mouth 4 (four) times daily as needed.       zinc gluconate 50 mg tablet Take 50 mg by mouth once daily.      cholestyramine (QUESTRAN) 4 gram packet Take 4 g by mouth 2 (two) times daily.      diltiaZEM (CARDIZEM CD) 120 MG Cp24 Take 1 capsule (120 mg total) by mouth once daily. 90 capsule 3    ferrous sulfate (FEOSOL) 325 mg (65 mg iron) Tab tablet 325 mg.      oxyCODONE (OXY-IR) 5 mg Cap Take 5 mg by mouth.      oxyCODONE-acetaminophen (PERCOCET) 5-325 mg per tablet Take 1 tablet by mouth every 6 (six) hours as needed for Pain. 12 tablet 0    sevelamer carbonate (RENVELA) 800 mg Tab 800 mg.      zinc sulfate (ZINCATE) 50 mg zinc (220 mg) capsule Take 1 capsule by mouth once daily.      [DISCONTINUED] miconazole (MICOTIN) 2 % cream Apply topically 2 (two) times daily. 28 g 0     Review of Systems:  AMS    Physical Exam:  Constitutional: nad, aao x 0, nonverbal, grunting some  Heart: rrr, no m/r/g, wwp, no edema  Lungs: ctab, no w/r/r/c, no lb  Abdomen: s/nt/nd, +BS      Results:  Lab Results   Component Value Date     11/23/2022    K 4.4 11/23/2022     11/23/2022    CO2 27 11/23/2022    BUN 27 (H) 11/23/2022    CREATININE 2.3 (H) 11/23/2022    CALCIUM 8.3 (L) 11/23/2022    ANIONGAP 7 (L) 11/23/2022    ESTGFRAFRICA 12.5 (A) 07/20/2022    EGFRNONAA 10.9 (A) 07/20/2022       Lab Results   Component Value Date    CALCIUM 8.3 (L) 11/23/2022    PHOS 3.8 11/23/2022       Recent Labs   Lab 11/23/22  0350   WBC 11.44   RBC 3.09*   HGB 10.0*   HCT 29.2*   PLT 92*   MCV 95   MCH 32.4*   MCHC 34.2         I have personally reviewed pertinent radiological imaging and reports.    I have spent > 35 minutes providing care for this patient for the above diagnoses. These services have included chart/data/imaging review, evaluation, exam, formulation of plan, , note preparation, and discussions with staff involved in this patient's care.    Raúl Dietz MD  Nephrology  [facility] [place]  [phone]

## 2022-11-23 NOTE — PT/OT/SLP PROGRESS
Occupational Therapy      Patient Name:  Shara Hutchins   MRN:  [id number]    Patient not seen today secondary to Nurse/ VANDANA hold. Will follow-up next scheduled service date.    11/23/2022

## 2022-11-23 NOTE — PROGRESS NOTES
[facility]  General Surgery  Progress Note    Subjective:     History of Present Illness:  No notes on file    Post-Op Info:  Procedure(s) (LRB):  LAPAROTOMY, EXPLORATORY (N/A)  CLOSURE, COLOSTOMY  COLECTOMY, TOTAL  REPAIR, BLADDER   7 Days Post-Op     Interval History: not very responsive, runs of v tach    Medications:  Continuous Infusions:   NORepinephrine bitartrate-D5W Stopped (11/22/22 0848)    TPN ADULT CENTRAL LINE CUSTOM (3 in 1) 50 mL/hr at 11/22/22 1806     Scheduled Meds:   sodium chloride 0.9%   Intravenous Once    epoetin nica-ebpx (RETACRIT) injection  100 Units/kg Intravenous Every Mon, Wed, Fri    levothyroxine  50 mcg Intravenous Q24H    magnesium sulfate IVPB  2 g Intravenous Once    metoprolol        metoprolol  2.5 mg Intravenous Once    midodrine  5 mg Oral TID    valproate sodium (DEPACON) IVPB  250 mg Intravenous Q8H     PRN Meds:sodium chloride, acetaminophen, calcium gluconate IVPB, calcium gluconate IVPB, calcium gluconate IVPB, dextrose 10%, dextrose 10%, glucagon (human recombinant), glucose, glucose, HYDROcodone-acetaminophen, HYDROmorphone, LIDOcaine HCl 2%, magnesium sulfate IVPB, magnesium sulfate IVPB, naloxone, ondansetron, potassium chloride in water **AND** potassium chloride in water **AND** potassium chloride in water, prochlorperazine, senna-docusate 8.6-50 mg, sodium chloride 0.9%, sodium chloride 0.9%, sodium phosphate IVPB, sodium phosphate IVPB, sodium phosphate IVPB     Review of patient's allergies indicates:   Allergen Reactions    Ciprofloxacin Other (See Comments)     Elevated liver enzymes      Pcn [penicillins] Anaphylaxis     TOLERATES ROCEPHIN WITHOUT DIFFICULTY     Objective:     Vital Signs (Most Recent):  Temp: 97.6 °F (36.4 °C) (11/23/22 0701)  Pulse: 90 (11/23/22 0701)  Resp: (!) 25 (11/23/22 0701)  BP: 138/60 (11/23/22 0701)  SpO2: (!) 90 % (11/23/22 0701)   Vital Signs (24h Range):  Temp:  [97.6 °F (36.4 °C)-98.2 °F (36.8 °C)]  97.6 °F (36.4 °C)  Pulse:  [] 90  Resp:  [11-38] 25  SpO2:  [88 %-99 %] 90 %  BP: ()/(41-84) 138/60  Arterial Line BP: ()/(32-68) 156/57     Weight: 47.2 kg (104 lb 0.9 oz)  Body mass index is 16.3 kg/m².    Intake/Output - Last 3 Shifts         11/21 0700 11/22 0659 11/22 0700 11/23 0659 11/23 0700 11/24 0659    P.O.       I.V. (mL/kg) 492.4 (10.4)      Blood  664     Other 650 500     IV Piggyback 300 250     TPN 1778.3 635.8     Total Intake(mL/kg) 3220.7 (68.2) 2049.8 (43.4)     Urine (mL/kg/hr) 900 (0.8) 355 (0.3)     Other 2150 2000     Stool 225 300     Total Output 3275 2655     Net -54.3 -605.2                    Physical Exam  Abdominal:      General: Abdomen is flat.      Palpations: Abdomen is soft.      Comments: Osotmy appears viable       Significant Labs:  I have reviewed all pertinent lab results within the past 24 hours.  CBC:   Recent Labs   Lab 11/23/22  0350   WBC 11.44   RBC 3.09*   HGB 10.0*   HCT 29.2*   PLT 92*   MCV 95   MCH 32.4*   MCHC 34.2     BMP:   Recent Labs   Lab 11/23/22  0350   *      K 4.4      CO2 27   BUN 27*   CREATININE 2.3*   CALCIUM 8.3*   MG 2.0       Significant Diagnostics:  I have reviewed all pertinent imaging results/findings within the past 24 hours.    Assessment/Plan:     * Ischemic colitis s/p laprotomy with total colectomy and ileostomy, 11/17  Having runs of v tach  Ostomy looks viable and having gi function  Will defer to medical team for management of vtach and other medical problems        Brigette Chavez MD  General Surgery  [facility]

## 2022-11-23 NOTE — PLAN OF CARE
Problem: Adult Inpatient Plan of Care  Goal: Plan of Care Review  Outcome: Ongoing, Progressing  Goal: Patient-Specific Goal (Individualized)  Outcome: Ongoing, Progressing  Goal: Absence of Hospital-Acquired Illness or Injury  Outcome: Ongoing, Progressing  Goal: Optimal Comfort and Wellbeing  Outcome: Ongoing, Progressing  Goal: Readiness for Transition of Care  Outcome: Ongoing, Progressing     Problem: Adjustment to Illness (Sepsis/Septic Shock)  Goal: Optimal Coping  Outcome: Ongoing, Progressing     Problem: Bleeding (Sepsis/Septic Shock)  Goal: Absence of Bleeding  Outcome: Ongoing, Progressing     Problem: Glycemic Control Impaired (Sepsis/Septic Shock)  Goal: Blood Glucose Level Within Desired Range  Outcome: Ongoing, Progressing     Problem: Infection Progression (Sepsis/Septic Shock)  Goal: Absence of Infection Signs and Symptoms  Outcome: Ongoing, Progressing     Problem: Nutrition Impaired (Sepsis/Septic Shock)  Goal: Optimal Nutrition Intake  Outcome: Ongoing, Progressing     Problem: Fluid and Electrolyte Imbalance (Acute Kidney Injury/Impairment)  Goal: Fluid and Electrolyte Balance  Outcome: Ongoing, Progressing     Problem: Oral Intake Inadequate (Acute Kidney Injury/Impairment)  Goal: Optimal Nutrition Intake  Outcome: Ongoing, Progressing     Problem: Renal Function Impairment (Acute Kidney Injury/Impairment)  Goal: Effective Renal Function  Outcome: Ongoing, Progressing     Problem: Fluid Imbalance (Pneumonia)  Goal: Fluid Balance  Outcome: Ongoing, Progressing     Problem: Infection (Pneumonia)  Goal: Resolution of Infection Signs and Symptoms  Outcome: Ongoing, Progressing     Problem: Respiratory Compromise (Pneumonia)  Goal: Effective Oxygenation and Ventilation  Outcome: Ongoing, Progressing     Problem: Infection  Goal: Absence of Infection Signs and Symptoms  Outcome: Ongoing, Progressing     Problem: Impaired Wound Healing  Goal: Optimal Wound Healing  Outcome: Ongoing, Progressing      Problem: Fall Injury Risk  Goal: Absence of Fall and Fall-Related Injury  Outcome: Ongoing, Progressing     Problem: Skin Injury Risk Increased  Goal: Skin Health and Integrity  Outcome: Ongoing, Progressing     Problem: Parenteral Nutrition  Goal: Effective Intravenous Nutrition Therapy Delivery  Outcome: Ongoing, Progressing     Problem: Device-Related Complication Risk (Hemodialysis)  Goal: Safe, Effective Therapy Delivery  Outcome: Ongoing, Progressing     Problem: Hemodynamic Instability (Hemodialysis)  Goal: Effective Tissue Perfusion  Outcome: Ongoing, Progressing     Problem: Infection (Hemodialysis)  Goal: Absence of Infection Signs and Symptoms  Outcome: Ongoing, Progressing     Problem: Oral Intake Inadequate  Goal: Improved Oral Intake  Outcome: Ongoing, Progressing     Problem: Adjustment to Surgery (Colostomy)  Goal: Optimal Coping  Outcome: Ongoing, Progressing     Problem: Bleeding (Colostomy)  Goal: Absence of Bleeding  Outcome: Ongoing, Progressing     Problem: Fluid, Electrolyte and Nutrition Imbalance (Colostomy)  Goal: Fluid, Electrolyte and Nutrition Balance  Outcome: Ongoing, Progressing     Problem: Infection (Colostomy)  Goal: Absence of Infection Signs and Symptoms  Outcome: Ongoing, Progressing     Problem: Ongoing Anesthesia Effects (Colostomy)  Goal: Anesthesia/Sedation Recovery  Outcome: Ongoing, Progressing     Problem: Pain (Colostomy)  Goal: Acceptable Pain Control  Outcome: Ongoing, Progressing     Problem: Postoperative Nausea and Vomiting (Colostomy)  Goal: Nausea and Vomiting Relief  Outcome: Ongoing, Progressing     Problem: Postoperative Stoma Care (Colostomy)  Goal: Optimal Stoma Healing  Outcome: Ongoing, Progressing     Problem: Postoperative Urinary Retention (Colostomy)  Goal: Effective Urinary Elimination  Outcome: Ongoing, Progressing     Problem: Respiratory Compromise (Colostomy)  Goal: Effective Oxygenation and Ventilation  Outcome: Ongoing, Progressing

## 2022-11-23 NOTE — NURSING
Consent and Hep b status verified, removed 0uf net; patient kept even, due to treatment stopping at 30 min. Patient having runs of [facility]. Dr Dietz aware. Patient is a difficult stick, due to stents and bleeds > 20-25 min when needles pulled. Unable to educated patient, due to LOC.Report given to CHERRY Dominique       11/23/22 1100   Handoff Report   Received From Adrienne   Given To Catina   Pain/Comfort/Sleep   Preferred Pain Scale rFLACC (Revised Face Legs Arms Cry Consolability Scale)   Comfort/Acceptable Pain Level 0   Pain Rating (0-10): Rest 0   Pain Rating (0-10): Activity 0   Pain Management Interventions quiet environment facilitated;pillow support provided;position adjusted   Fever Reduction/Comfort Measures lightweight bedding;lightweight clothing   Cognitive   Level of Consciousness (AVPU) responds to voice   Oxygen Therapy   Flow (L/min) 6   Edema   Edema hand, right;hand, left        Hemodialysis AV Fistula Left upper arm   No Placement Date or Time found.   Present Prior to Hospital Arrival?: Yes  Location: Left upper arm   Site Assessment Clean;Dry;Intact   Status Deaccessed   Dressing Intervention First dressing   Dressing Status Clean;Dry;Intact   Site Condition Bleeding  (held stick for > 20 min)   Dressing Gauze

## 2022-11-23 NOTE — PROGRESS NOTES
Patient seen and examined.  No significant changes from surgical perspective.  Pressors have been weaned.  She is hemodynamically stable.  Some issues with bleeding in the Godwin catheter which seemed to be resolving.      Abdomen soft  Ostomy with minimal bilious fluid    Labs reviewed.    No significant changes from my perspective.  Will need better mental status prior to initiating p.o. intake.  Continue TPN for now.  Again, patient is at high risk for short gut syndrome.  No plans for additional operative intervention in the abdomen as she has no other organs that can be removed and remain compatible with life.

## 2022-11-23 NOTE — PT/OT/SLP PROGRESS
Physical Therapy      Patient Name:  Shara Hutchins   MRN:  [id number]    Patient not seen today secondary to Dialysis, Nurse/ VANDANA hold (Pt not doing well medically.). Will follow-up 11/24/22.

## 2022-11-23 NOTE — PROGRESS NOTES
[facility]  Department of Neurology  Neurology Progress Note        PATIENT NAME: Shara Hutchins  MRN: [id number]  CSN: [id number]      TODAY'S DATE: 11/23/2022  ADMIT DATE: 11/16/2022                            CONSULTING PROVIDER: Aileen Sims MD  CONSULT REQUESTED BY: Carlitos Bowden MD      Reason for consult:  Encephalopathy        History obtained from chart review.    HPI per EMR: Shara Hutchins is a 72 y.o. female who was initially brought to the ED initially on 11/16 via EMS from outpatient dialysis center due to hypotension. .  Recently diagnosed with persistent hypotension for which she was started on midodrine.  Known history of ESRD on HD MWF.  She states blood pressure has been running low since previous admission, yesterday midodrine was increased from 5 mg t.i.d. to 10 mg t.i.d..  States her appetite has been low, over the last 24 hours has noticed decreased output from her colostomy, chronic nausea without any recent change, no significant increased abdominal pain.  States at dialysis today, near end of session, needle got dislodged from fistula, about 300 cc of blood loss, states following this she became lightheaded/dizzy, blood pressure 60/30 and she was referred to the ED.    Admitted to the ICU, received fluid bolus, subsequently started on Levophed    Of note, Patient with complicated recent history, about 2 months ago, admitted with ischemic colitis status post laparotomy with colostomy, stated she stayed in the hospital for about 1 month, she was discharge but had fall at assisted living facility and re-presented to the hospital following day, since then has had persistent hypotension, antihypertensives were discontinued, she was started on midodrine.    CT abdomen and pelvis obtained with diffuse pneumatosis intestinal most prominent ascending, transverse and descending colon with portal vein gas.  She was started on broad-spectrum antibiotics.  General surgery was consulted  and underwent emergent exploratory laparotomy with total colectomy, creation of ileostomy, inadvertent bladder injury requiring repair.  She was extubated postprocedure and transferred to the ICU.  Received 1 unit PRBC and FFP.  On 11/17 reports nausea, abdominal pain, still on Levophed.  11/18 still with significant abdominal pain, no output from ileostomy, receiving dialysis today, running even, still on Levophed, resuming home midodrine and attempting to wean off.  On 11/20, received unit of PRBC, encephalopathy persists, CT head negative encephalopathy labs ordered, having output from ileostomy and advanced to clear liquid diet.  Urine output from Godwin is now yellow.  On 11/21, encephalopathy persists, exam is nonfocal, having output from ileostomy, not safe for oral intake, on TPN.    Neurology consult:  Patient was seen and examined by me.  She is awake, tracking however she does not answer or follow commands.  Apparently patient was in her normal mental status prior to surgery and since her surgery, she has been encephalopathic, not answering questions or following commands.  She moves all her extremities symmetrically antigravity.  Patient is on TPN    11/22/22: Patient was seen and examined by me today. Her mental status is unchanged, but she seems non focal on exam. No acute events reported overnight.    11/23/22:  Patient was seen and examined by me today.  No changes in mental status.  Unable to tolerate MRI brain.      CURRENT SCHEDULED MEDICATIONS:   sodium chloride 0.9%   Intravenous Once    epoetin nica-ebpx (RETACRIT) injection  100 Units/kg Intravenous Every Mon, Wed, Fri    levothyroxine  50 mcg Intravenous Q24H    midodrine  5 mg Oral TID    valproate sodium (DEPACON) IVPB  250 mg Intravenous Q8H     CURRENT INFUSIONS:   NORepinephrine bitartrate-D5W Stopped (11/22/22 0848)    TPN ADULT CENTRAL LINE CUSTOM (3 in 1) 50 mL/hr at 11/22/22 1806     CURRENT PRN MEDICATIONS:  sodium chloride,  acetaminophen, calcium gluconate IVPB, calcium gluconate IVPB, calcium gluconate IVPB, dextrose 10%, dextrose 10%, glucagon (human recombinant), glucose, glucose, HYDROcodone-acetaminophen, HYDROmorphone, LIDOcaine HCl 2%, magnesium sulfate IVPB, magnesium sulfate IVPB, naloxone, ondansetron, potassium chloride in water **AND** potassium chloride in water **AND** potassium chloride in water, prochlorperazine, senna-docusate 8.6-50 mg, sodium chloride 0.9%, sodium chloride 0.9%, sodium phosphate IVPB, sodium phosphate IVPB, sodium phosphate IVPB    REVIEW OF SYSTEMS:  A review of systems cannot be obtained as the patient is unable to respond to questions appropriately.       PHYSICAL EXAM:  Patient Vitals for the past 24 hrs:   BP Temp Temp src Pulse Resp SpO2   11/23/22 0530 (!) 147/64 -- -- 99 20 --   11/23/22 0515 (!) 140/60 -- -- 99 19 --   11/23/22 0500 (!) 86/41 -- -- 97 (!) 21 --   11/23/22 0445 (!) 120/49 -- -- 94 (!) 23 --   11/23/22 0430 (!) 125/56 -- -- 90 (!) 22 (!) 92 %   11/23/22 0415 -- -- -- 101 (!) 34 (!) 94 %   11/23/22 0400 (!) 182/70 -- -- 89 (!) 33 95 %   11/23/22 0345 (!) 189/77 -- -- 95 (!) 38 (!) 94 %   11/23/22 0330 (!) 158/70 -- -- 82 (!) 23 (!) 94 %   11/23/22 0315 (!) 177/72 98 °F (36.7 °C) Axillary 83 (!) 21 (!) 94 %   11/23/22 0300 (!) 166/72 -- -- 77 (!) 21 95 %   11/23/22 0245 (!) 177/73 -- -- 83 (!) 24 95 %   11/23/22 0230 (!) 171/84 -- -- 83 (!) 22 (!) 94 %   11/23/22 0215 (!) 162/67 -- -- 74 17 (!) 94 %   11/23/22 0115 (!) 122/56 -- -- 67 11 96 %   11/23/22 0101 (!) 119/57 -- -- 70 11 96 %   11/23/22 0100 (!) 119/57 -- -- 69 12 96 %   11/23/22 0045 (!) 122/55 -- -- 72 12 95 %   11/23/22 0030 (!) 128/56 -- -- 70 13 95 %   11/23/22 0000 (!) 143/62 -- -- 78 13 97 %   11/22/22 2315 (!) 168/68 98 °F (36.7 °C) Axillary 84 17 98 %   11/22/22 2300 (!) 117/50 -- -- 78 13 96 %   11/22/22 2204 (!) 129/52 -- -- 91 13 95 %   11/22/22 2200 (!) 129/52 -- -- 90 13 95 %   11/22/22 2134 -- -- -- --  (!) 25 --   11/22/22 2030 -- -- -- 100 (!) 21 97 %   11/22/22 2015 -- -- -- 102 (!) 23 96 %   11/22/22 2000 108/61 98.2 °F (36.8 °C) Axillary 108 (!) 23 97 %   11/22/22 1945 -- -- -- 107 (!) 23 95 %   11/22/22 1930 -- -- -- 105 (!) 22 97 %   11/22/22 1900 (!) 103/57 -- -- 107 (!) 25 (!) 94 %   11/22/22 1800 (!) 160/63 -- -- 108 (!) 22 99 %   11/22/22 1700 (!) 149/64 -- -- 102 (!) 31 97 %   11/22/22 1600 (!) 134/53 98.2 °F (36.8 °C) Axillary 91 (!) 23 (!) 94 %   11/22/22 1545 -- -- -- 97 (!) 25 96 %   11/22/22 1530 -- -- -- 102 (!) 25 (!) 93 %   11/22/22 1515 -- -- -- 104 (!) 26 (!) 93 %   11/22/22 1500 (!) 127/57 -- -- 106 (!) 26 (!) 93 %   11/22/22 1445 -- 98 °F (36.7 °C) Axillary 100 (!) 26 96 %   11/22/22 1430 -- 97.8 °F (36.6 °C) -- 95 (!) 25 96 %   11/22/22 1415 -- 97.8 °F (36.6 °C) -- 102 (!) 24 (!) 93 %   11/22/22 1400 (!) 129/58 98.1 °F (36.7 °C) Axillary 106 (!) 24 (!) 91 %   11/22/22 1345 -- 98 °F (36.7 °C) Axillary 103 (!) 25 (!) 94 %   11/22/22 1330 -- 97.8 °F (36.6 °C) Axillary 100 (!) 23 (!) 94 %   11/22/22 1315 -- 97.8 °F (36.6 °C) Axillary 105 (!) 24 (!) 89 %   11/22/22 1310 -- 97.8 °F (36.6 °C) Axillary 107 (!) 25 (!) 88 %   11/22/22 1300 (!) 171/72 -- -- 103 (!) 25 (!) 88 %   11/22/22 1245 -- -- -- 100 (!) 23 (!) 91 %   11/22/22 1230 -- -- -- 98 (!) 24 (!) 91 %   11/22/22 1200 (!) 176/69 98 °F (36.7 °C) Axillary 92 19 (!) 94 %   11/22/22 1100 -- -- -- 89 20 (!) 94 %   11/22/22 1000 (!) 190/71 -- -- 95 (!) 21 (!) 94 %   11/22/22 0900 (!) 182/73 -- -- 97 (!) 27 (!) 93 %         GENERAL APPEARANCE: Alert, well-developed, well-nourished female in no acute distress.  HEENT: Normocephalic and atraumatic. PERRL. Oropharynx unremarkable.  PULM: Normal respiratory effort. No accessory muscle use.  CV: RRR.  ABDOMEN: Soft, nontender.  EXTREMITIES: No obvious signs of vascular compromise. Pulses present. No cyanosis, clubbing or edema.  SKIN: Clear; no rashes, lesions or skin breaks in exposed  areas.    NEURO:  MENTAL STATUS:  Patient is alert, able to track however not following commands or answering questions.  She does not talk however she moves all extremities symmetrically antigravity. .  Affect labile.    CRANIAL NERVES:  Pupils equal round reactive to light, face is grossly symmetric.  Rest of cranial nerves can not be assessed due to mental status.  MOTOR:  Bulk normal. Tone normal and symmetric throughout.  Abnormal movements absent.  Tremor: none present.  Strength  antigravity strength in all extremities which is symmetrical.  Can not assess exact strength due to mental status .    REFLEXES:  DTRs 1+ throughout.  Plantar response equivocal bilaterally.  SENSATION: not tested.  COORDINATION:  Not tested due to mental status .  STATION: not tested.  GAIT: not tested.    Labs:  Recent Labs   Lab 11/20/22  0414 11/21/22  0409 11/22/22  0346 11/23/22  0350   * 137 137 140   K 3.8 4.0 4.2 4.4    108 106 106   CO2 22* 22* 26 27   BUN 22 32* 30* 27*   CREATININE 3.5* 4.3* 3.2* 2.3*   * 148* 184* 133*   CALCIUM 7.9* 8.2* 7.9* 8.3*   PHOS 2.0* 2.4* 3.4 3.8   MG 3.0* 3.0* 2.3  --        Recent Labs   Lab 11/21/22  0409 11/22/22 0346 11/22/22 2022 11/23/22  0350   WBC 10.04 13.13*  --  11.44   HGB 8.7* 7.6* 10.4* 10.0*   HCT 27.0* 23.4*  --  29.2*   PLT 76* 83*  --  92*       Recent Labs   Lab 11/21/22  0409 11/22/22  0346 11/23/22  0350   ALBUMIN 1.5* 1.5* 1.6*   PROT 4.1* 4.2* 4.6*   BILITOT 3.3* 2.3* 2.6*   ALKPHOS 454* 416* 422*   ALT 26 20 18   AST 27 21 20       Lab Results   Component Value Date    INR 1.7 11/22/2022     Lab Results   Component Value Date    TRIG 96 11/18/2022    HDL 57 05/25/2021    CHOLHDL 60.0 (H) 05/25/2021     Lab Results   Component Value Date    HGBA1C 4.6 05/25/2021     No results found for: PROTEINCSF, GLUCCSF, WBCCSF    Imaging:  I have reviewed and interpreted the pertinent imaging and lab results.      X-Ray Abdomen AP 1 View  Reason: abdominal  surgery abdominal surgery    FINDINGS:  Supine abdomen compared with 11/16/2022 CT shows nonspecific lucency in the upper abdomen which could be artifactual in nature or represent expected postoperative free intraperitoneal gas. Bowel gas pattern is nonobstructive. Right upper quadrant surgical clips indicate cholecystectomy. Right ureteral stent remains in place. Bilateral medullary nephrocalcinosis is more conspicuous in the left kidney, unchanged. Diffuse arterial vascular calcifications are present. Godwin catheter tip projects in pelvis.    Partially visualized lung bases are clear. Convex left lumbar spine curvature is evident.    IMPRESSION:  Nonobstructive bowel gas pattern with nonspecific lucency in upper abdomen as discussed above, and additional findings which are unchanged.    Electronically signed by:  Wellington Epperson MD  11/21/2022 9:30 AM CST Workstation: [id number]  X-Ray Chest AP Portable  Reason: AMS    FINDINGS:    Portable chest comparison chest x-ray November 17, 2022 show borderline enlarged cardiomediastinal silhouette. Left internal jugular central venous catheter is noted.  There is interval development of bilateral mixed interstitial and airspace lung opacities, worst in the left upper lung. There is prominence of the central hilar vascular structures. No acute osseous abnormality.    IMPRESSION:    Borderline enlarged cardiomediastinal silhouette with prominence of the central vascular structures interval development of bilateral mixed interstitial and airspace lung opacities, worst in the left lung. Findings could reflect CHF with pulmonary edema. Correlate.    Electronically signed by:  Elias Sherwood DO  11/21/2022 9:27 AM CST Workstation: [id number]         ASSESSMENT & PLAN:    Encephalopathy  Ischemic Colitis       Plan:   Etiology of encephalopathy is unknown and could likely be infectious/metabolic encephalopathy. Concern for primary neurological cause for encephalopathy is less  likely. Cannot rule out psychiatric etiology, but this is very unlikely.  EEG showed no evidence of seizures. Triphasic waves are indicative of metabolic encephalopathy  Check reversible causes of encephalopathy including including Vitamin b12 (normal), folate (normal), normal Ammonia, normal TSH.   CTH: No acute intracranial abnormality.  Neuro checks per unit protocol   Started on Depacon 250mg TID for mood.   Management of metabolic and infectious abnormalities per primary team.   MRI brain has been ordered, but patient unable to tolerate. Will review images when available.  Will follow         Thank you kindly for including us in the care of this patient. Please do not hesitate to contact us with any questions.      35 minutes of care time has been spent evaluating with the patient. Time includes chart review not limited to diagnostic imaging, labs, and vitals, patient assessment, discussion with family and nursing, current order evaluations, and new order entries.       Aileen Sims MD  Neurology/vascular Neurology  Date of Service: 11/23/2022

## 2022-11-23 NOTE — ASSESSMENT & PLAN NOTE
Having runs of v tach  Ostomy looks viable and having gi function  Will defer to medical team for management of vtach

## 2022-11-23 NOTE — PROGRESS NOTES
[facility]  Adult Nutrition   Progress Note (Nutrition Support Management)    SUMMARY     Recommendations  Recommendation/Intervention:   1. New TPN ordered to be initiated @ 1700. Run @ 50 mL/hr.   2. Blood glucose and electrolytes to be monitored and corrected accordingly.   3. RD to monitor TPN infusion and tolerance. Monitor I/Os. Monitor and mange nutrition support daily.   4. Clear liquids to be provided by nursing as tolerated, diet to advance as medically able. 5. RD added ONS, Ensure Clear TID (to provide 720 kcal/day and 24 g/day protein).    Goals:   1. Pt to meet estimated needs via TPN provision + PO intake.  2. Patient to tolerate diet.  3. BG and electrolytes to trend towards normal limits.   4. Transitional feeding from TPN to PO once oral intake is estimated to meet at least 60% of needs.  Nutrition Goal Status: progressing towards goal    Dietitian Rounds Brief  Follow-up. TPN to continue for now. Pt will need better mental status prior to initiating PO diet per surgery.     PARENTERAL NUTRITION PROGRESS NOTE:    Parenteral Nutrition Day # 7  Diagnosis/Indication for PN: S/p total colectomy, malnutrition  IV Access: Percutaneous Central Line  Diet/Tube Feeding: trial clear liquids, ensure clear TID      Admixture Type: 3-in-1  Infusion Rate and Frequency: 50 mL/hr  Patient Acuity: ICU     PN Composition:   66 grams Amino Acid, 196 grams Dextrose, and 50 grams Lipid.    Today's TPN provides 1430 kcal.  *Dextrose is started at less than full dextrose goal to reduce risk for Refeeding Syndrome. Dextrose content will be advanced as appropriate over the next few days.     Please see order for electrolytes and additives content and adjustments.   *The Nutrition Support Team will continue to monitor electrolytes daily and adjust parenteral nutrition as warranted.     Malnutrition Assessment:  Pt is w/ previous malnutrition d/x. Per chart review, pt weighted 156 lbs on 9/822 and now  "currently weights 104 lbs. Pt has lost 52 lbs within a 2 months time span (33% body wt loss) - severe. RD agrees w/ previous d/x s/t BMI of 16.28 kg/m2, and 52 lbs wt loss (33% body wt loss).      Severe malnutrition related to decreased intake in pt w/ increased needs d/t HD, cancer as evidenced by BMI of 16.28 kg/m2, 52 lbs wt loss (33% body wt loss), and < 75% of estimated energy requirement for > 1 month.    Diet order:   Current Diet Order: clear liquid - provided by nursing   Oral Nutrition Supplement: ensure clear TID  Current Nutrition Support Formula Ordered: Other (Comment) (custom 3-in-1 TPN)  Current Nutrition Support Rate Ordered: 50 (ml)  Current Nutrition Support Frequency Ordered: ml/hr    Evaluation of Received Nutrient/Fluid Intake  Parenteral Calories (kcal): 1423  Parenteral Protein (gm): 65  Parenteral Fluid (mL): 1200  Lipid Calories (kcals): 500 kcals  GIR (Glucose Infusion Rate) (mg/kg/min): 3.2 mg/kg/min  Energy Calories Required: not meeting needs  Protein Required: not meeting needs  Fluid Required: not meeting needs  Tolerance: tolerating     % Intake of Estimated Energy Needs: 75 - 100 %  % Meal Intake: 0 - 25 %      Intake/Output Summary (Last 24 hours) at 11/23/2022 1149  Last data filed at 11/23/2022 0602  Gross per 24 hour   Intake 2049.83 ml   Output 2555 ml   Net -505.17 ml        Anthropometrics  Temp: 97.6 °F (36.4 °C)  Height Method: Stated  Height: 5' 7" (170.2 cm)  Height (inches): 67 in  Weight Method: Bed Scale  Weight: 47.2 kg (104 lb 0.9 oz)  Weight (lb): 104.06 lb  Ideal Body Weight (IBW), Female: 135 lb  % Ideal Body Weight, Female (lb): 77.08 %  BMI (Calculated): 16.3  BMI Grade: 16 - 16.9 protein-energy malnutrition grade II       Estimated/Assessed Needs  Weight Used For Calorie Calculations: 47.2 kg (104 lb 0.9 oz)  Energy Calorie Requirements (kcal): 5007-8915 (35-40)  Energy Need Method: Kcal/kg  Protein Requirements: 57-71 (1.2-1.5 gm/kg)  Weight Used For Protein " Calculations: 47.2 kg (104 lb 0.9 oz)  Fluid Requirements (mL): 24 hour UOP + 1000 mL     RDA Method (mL): 1652       Reason for Assessment  Reason For Assessment: new TPN, RD follow-up  Diagnosis: other (see comments) (Ischemic colitis s/p laprotomy with total colectomy and ileostomy)  Relevant Medical History: Vulvar cancer, hypotension, hypothyroidism, anemia of chronic disease, severe malnutrition, ESRD on dialysis MWF    Nutrition/Diet History  Food Allergies: NKFA  Factors Affecting Nutritional Intake: impaired cognitive status/motor control, altered gastrointestinal function, clear liquid diet    Nutrition Risk Screen  Nutrition Risk Screen: tube feeding or parenteral nutrition     MST Score: 0  Have you recently lost weight without trying?: No  Weight loss score: 0  Have you been eating poorly because of a decreased appetite?: No  Appetite score: 0       Weight History:  Wt Readings from Last 5 Encounters:   11/17/22 47.2 kg (104 lb 0.9 oz)   11/17/22 47.2 kg (104 lb)   10/29/22 50.8 kg (112 lb)   09/15/22 70.8 kg (156 lb)   09/08/22 70.9 kg (156 lb 3.2 oz)        Medications:Pertinent Medications Reviewed  Scheduled Meds:   sodium chloride 0.9%   Intravenous Once    epoetin nica-ebpx (RETACRIT) injection  100 Units/kg Intravenous Every Mon, Wed, Fri    magnesium sulfate IVPB  2 g Intravenous Once    metoprolol        metoprolol  2.5 mg Intravenous Once    midodrine  5 mg Oral TID    valproate sodium (DEPACON) IVPB  250 mg Intravenous Q8H     Continuous Infusions:   NORepinephrine bitartrate-D5W Stopped (11/22/22 0848)    TPN ADULT CENTRAL LINE CUSTOM (3 in 1) 50 mL/hr at 11/22/22 1806    TPN ADULT CENTRAL LINE CUSTOM (3 in 1)       PRN Meds:.sodium chloride, acetaminophen, calcium gluconate IVPB, calcium gluconate IVPB, calcium gluconate IVPB, dextrose 10%, dextrose 10%, glucagon (human recombinant), glucose, glucose, HYDROcodone-acetaminophen, HYDROmorphone, LIDOcaine HCl 2%, magnesium sulfate IVPB,  magnesium sulfate IVPB, naloxone, ondansetron, potassium chloride in water **AND** potassium chloride in water **AND** potassium chloride in water, prochlorperazine, senna-docusate 8.6-50 mg, sodium chloride 0.9%, sodium chloride 0.9%, sodium phosphate IVPB, sodium phosphate IVPB, sodium phosphate IVPB    Labs: Pertinent Labs Reviewed  Clinical Chemistry:  Recent Labs   Lab 11/21/22  0409 11/22/22  0346 11/23/22  0350 11/23/22  1102    137 140 139   K 4.0 4.2 4.4 4.1    106 106 105   CO2 22* 26 27 27   * 184* 133* 163*   BUN 32* 30* 27* 26*   CREATININE 4.3* 3.2* 2.3* 2.1*   CALCIUM 8.2* 7.9* 8.3* 8.2*   PROT 4.1* 4.2* 4.6*  --    ALBUMIN 1.5* 1.5* 1.6*  --    BILITOT 3.3* 2.3* 2.6*  --    ALKPHOS 454* 416* 422*  --    AST 27 21 20  --    ALT 26 20 18  --    ANIONGAP 7* 5* 7* 7*   MG 3.0* 2.3 2.0 1.8   PHOS 2.4* 3.4 3.8  --      CBC:   Recent Labs   Lab 11/23/22  1102   WBC 12.98*   RBC 3.23*   HGB 10.1*  10.1*   HCT 30.5*   PLT 98*   MCV 94   MCH 31.3*   MCHC 33.1     Lipid Panel:  Recent Labs   Lab 11/18/22  0400   TRIG 96     Cardiac Profile:  Recent Labs   Lab 11/16/22  1318   *     Thyroid & Parathyroid:  Recent Labs   Lab 11/21/22  1043   TSH 3.810       Monitor and Evaluation  Food and Nutrient Intake: energy intake, food and beverage intake, parenteral nutrition intake  Food and Nutrient Adminstration: diet order, enteral and parenteral nutrition administration  Knowledge/Beliefs/Attitudes: food and nutrition knowledge/skill  Physical Activity and Function: nutrition-related ADLs and IADLs, factors affecting access to physical activity  Anthropometric Measurements: weight, weight change, body mass index  Biochemical Data, Medical Tests and Procedures: inflammatory profile, electrolyte and renal panel, gastrointestinal profile, lipid profile, glucose/endocrine profile  Nutrition-Focused Physical Findings: overall appearance     Nutrition Risk  Level of Risk/Frequency of Follow-up:  high     Nutrition Follow-Up  RD Follow-up?: Yes      Alesia Rangel RD 11/23/2022 11:49 AM

## 2022-11-23 NOTE — NURSING
Unable to obtain MRI due to pt moving, Dr Johnson notified and okay to d/c MRI order at this time.    Dr Mayes spoke with family at bedside about hospice consult and family agrees. Orders placed to consult hospice.

## 2022-11-23 NOTE — NURSING
Pt on dialysis machine for approximately 30 min and began having runs of SVT and Vtach, dr dominguez at bedside.   Orders for IV lopressor and 2 g of mag given per dr dominguez.   Will d/c transfer order at this time and continue to monitor in ICU, consult placed to cardiology.

## 2022-11-23 NOTE — SUBJECTIVE & OBJECTIVE
Interval History: not very responsive, runs of v tach    Medications:  Continuous Infusions:   NORepinephrine bitartrate-D5W Stopped (11/22/22 0848)    TPN ADULT CENTRAL LINE CUSTOM (3 in 1) 50 mL/hr at 11/22/22 1806     Scheduled Meds:   sodium chloride 0.9%   Intravenous Once    epoetin nica-ebpx (RETACRIT) injection  100 Units/kg Intravenous Every Mon, Wed, Fri    levothyroxine  50 mcg Intravenous Q24H    magnesium sulfate IVPB  2 g Intravenous Once    metoprolol        metoprolol  2.5 mg Intravenous Once    midodrine  5 mg Oral TID    valproate sodium (DEPACON) IVPB  250 mg Intravenous Q8H     PRN Meds:sodium chloride, acetaminophen, calcium gluconate IVPB, calcium gluconate IVPB, calcium gluconate IVPB, dextrose 10%, dextrose 10%, glucagon (human recombinant), glucose, glucose, HYDROcodone-acetaminophen, HYDROmorphone, LIDOcaine HCl 2%, magnesium sulfate IVPB, magnesium sulfate IVPB, naloxone, ondansetron, potassium chloride in water **AND** potassium chloride in water **AND** potassium chloride in water, prochlorperazine, senna-docusate 8.6-50 mg, sodium chloride 0.9%, sodium chloride 0.9%, sodium phosphate IVPB, sodium phosphate IVPB, sodium phosphate IVPB     Review of patient's allergies indicates:   Allergen Reactions    Ciprofloxacin Other (See Comments)     Elevated liver enzymes      Pcn [penicillins] Anaphylaxis     TOLERATES ROCEPHIN WITHOUT DIFFICULTY     Objective:     Vital Signs (Most Recent):  Temp: 97.6 °F (36.4 °C) (11/23/22 0701)  Pulse: 90 (11/23/22 0701)  Resp: (!) 25 (11/23/22 0701)  BP: 138/60 (11/23/22 0701)  SpO2: (!) 90 % (11/23/22 0701)   Vital Signs (24h Range):  Temp:  [97.6 °F (36.4 °C)-98.2 °F (36.8 °C)] 97.6 °F (36.4 °C)  Pulse:  [] 90  Resp:  [11-38] 25  SpO2:  [88 %-99 %] 90 %  BP: ()/(41-84) 138/60  Arterial Line BP: ()/(32-68) 156/57     Weight: 47.2 kg (104 lb 0.9 oz)  Body mass index is 16.3 kg/m².    Intake/Output - Last 3 Shifts         11/21 0700  11/22  0659 11/22 0700  11/23 0659 11/23 0700  11/24 0659    P.O.       I.V. (mL/kg) 492.4 (10.4)      Blood  664     Other 650 500     IV Piggyback 300 250     TPN 1778.3 635.8     Total Intake(mL/kg) 3220.7 (68.2) 2049.8 (43.4)     Urine (mL/kg/hr) 900 (0.8) 355 (0.3)     Other 2150 2000     Stool 225 300     Total Output 3275 2655     Net -54.3 -605.2                    Physical Exam  Abdominal:      General: Abdomen is flat.      Palpations: Abdomen is soft.      Comments: Osotmy appears viable       Significant Labs:  I have reviewed all pertinent lab results within the past 24 hours.  CBC:   Recent Labs   Lab 11/23/22  0350   WBC 11.44   RBC 3.09*   HGB 10.0*   HCT 29.2*   PLT 92*   MCV 95   MCH 32.4*   MCHC 34.2     BMP:   Recent Labs   Lab 11/23/22  0350   *      K 4.4      CO2 27   BUN 27*   CREATININE 2.3*   CALCIUM 8.3*   MG 2.0       Significant Diagnostics:  I have reviewed all pertinent imaging results/findings within the past 24 hours.

## 2022-11-23 NOTE — PLAN OF CARE
Problem: Parenteral Nutrition  Goal: Effective Intravenous Nutrition Therapy Delivery  Outcome: Ongoing, Progressing  Intervention: Optimize Intravenous Nutrition Delivery  Flowsheets (Taken 11/23/2022 2718)  Nutrition Support Management: (New TPN) other (see comments)

## 2022-11-23 NOTE — PROGRESS NOTES
[facility] Medicine  Progress Note    Patient name: Shara Hutchins  MRN: [id number]  Admit Date: 11/16/2022   LOS: 6 days     SUBJECTIVE:     Principal problem: Ischemic colitis    Interval History:  Ptiet not communicatiove. Does open eye during my examinaiton but does not respond to questions.    Scheduled Meds:   epoetin nica-ebpx (RETACRIT) injection  100 Units/kg Intravenous Every Mon, Wed, Fri    levothyroxine  50 mcg Intravenous Q24H    midodrine  5 mg Oral TID    valproate sodium (DEPACON) IVPB  250 mg Intravenous Q8H     Continuous Infusions:   NORepinephrine bitartrate-D5W Stopped (11/22/22 0848)    TPN ADULT CENTRAL LINE CUSTOM (3 in 1) 50 mL/hr at 11/22/22 1806     PRN Meds:sodium chloride, acetaminophen, calcium gluconate IVPB, calcium gluconate IVPB, calcium gluconate IVPB, dextrose 10%, dextrose 10%, glucagon (human recombinant), glucose, glucose, HYDROcodone-acetaminophen, HYDROmorphone, LIDOcaine HCl 2%, magnesium sulfate IVPB, magnesium sulfate IVPB, naloxone, ondansetron, potassium chloride in water **AND** potassium chloride in water **AND** potassium chloride in water, prochlorperazine, senna-docusate 8.6-50 mg, sodium chloride 0.9%, sodium phosphate IVPB, sodium phosphate IVPB, sodium phosphate IVPB    Review of patient's allergies indicates:   Allergen Reactions    Ciprofloxacin Other (See Comments)     Elevated liver enzymes      Pcn [penicillins] Anaphylaxis     TOLERATES ROCEPHIN WITHOUT DIFFICULTY       Review of Systems: As per interval history    OBJECTIVE:     Vital Signs (Most Recent)  Temp: 98.2 °F (36.8 °C) (11/22/22 2000)  Pulse: 100 (11/22/22 2030)  Resp: (!) 25 (11/22/22 2134)  BP: 108/61 (11/22/22 2000)  SpO2: 97 % (11/22/22 2030)    Vital Signs Range (Last 24H):  Temp:  [97.4 °F (36.3 °C)-98.8 °F (37.1 °C)]   Pulse:  []   Resp:  [13-31]   BP: ()/(39-73)   SpO2:  [88 %-99 %]   Arterial Line BP: (101-172)/(39-69)     I & O (Last 24H):  Intake/Output  Summary (Last 24 hours) at 11/22/2022 2137  Last data filed at 11/22/2022 1830  Gross per 24 hour   Intake 2772.71 ml   Output 3105 ml   Net -332.29 ml          Physical Exam  Vitals and nursing note reviewed.   Constitutional:       Comments: Chronically and critically ill appearing, lying in bed   HENT:      Head: Normocephalic and atraumatic.      Mouth/Throat:      Mouth: Mucous membranes are moist.   Eyes:      General:         Right eye: No discharge.         Left eye: No discharge.   Neck:      Comments: LIJ in place  Cardiovascular:      Rate and Rhythm: Normal rate and regular rhythm.      Comments: Wearing SCDs  Pulmonary:      Comments: On NC, decreased at bases, no wheeze, no accessory muscle use  Abdominal:      Tenderness: There is abdominal tenderness.      Comments: ileostomy RLQ, surgical dressing in place, dark green output seen in ileostomy   Genitourinary:     Comments: Godwin in place  Skin:     Comments: LUE AVF. Edema bilateral hands R>L   Neurological:      Mental Status: She is alert.      Comments: Eyes open, appears to be moves all 4 extremities spontaneously but not to command, did not verbalize during my encounter         Laboratory:  I have reviewed all pertinent lab results within the past 24 hours.  CBC:   Recent Labs   Lab 11/22/22 0346 11/22/22 2022   WBC 13.13*  --    RBC 2.36*  --    HGB 7.6* 10.4*   HCT 23.4*  --    PLT 83*  --    MCV 99*  --    MCH 32.2*  --    MCHC 32.5  --      BMP:   Recent Labs   Lab 11/22/22 0346   *      K 4.2      CO2 26   BUN 30*   CREATININE 3.2*   CALCIUM 7.9*   MG 2.3     CMP:   Recent Labs   Lab 11/22/22 0346   *   CALCIUM 7.9*   ALBUMIN 1.5*   PROT 4.2*      K 4.2   CO2 26      BUN 30*   CREATININE 3.2*   ALKPHOS 416*   ALT 20   AST 21   BILITOT 2.3*       Diagnostic Results:  Labs: Reviewed  ECG: Reviewed  X-Ray: Reviewed    ASSESSMENT/PLAN:         Active Hospital Problems    Diagnosis  POA    *Ischemic  colitis s/p laprotomy with total colectomy and ileostomy, 11/17 [K55.9]  Yes    Generalized weakness [R53.1]  Yes     Chronic    Encephalopathy, metabolic, fluctuating [G93.41]  Clinically Undetermined    Status post total colectomy [Z90.49]  Not Applicable    Bladder injury [S37.20XA]  No    Hypotension, acute on chronic [I95.9]  Yes    QT prolongation [R94.31]  Yes    Thrombocytopenia [D69.6]  Yes    S/P ureteral stent placement [Z96.0]  Not Applicable    ESRD on dialysis MWF [N18.6, Z99.2]  Not Applicable     Chronic    Bilateral LE neuropathy [G62.9]  Yes     Chronic    Severe malnutrition [E43]  Yes    Hypothyroidism [E03.9]  Yes     Chronic    Anemia of chronic disease, status post 1 unit PRBC 11/19 [D63.8]  Yes     Chronic    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]  Yes     Chronic      Resolved Hospital Problems    Diagnosis Date Resolved POA    Hematuria [R31.9] 11/20/2022 No    Hypophosphatemia [E83.39] 11/20/2022 No    Hyponatremia [E87.1] 11/20/2022 No    Hypokalemia [E87.6] 11/18/2022 Yes    Lactic acidosis [E87.20] 11/18/2022 Yes    Hypomagnesemia [E83.42] 11/17/2022 Yes       Active Hospital Problems     Diagnosis    *Ischemic colitis s/p laprotomy with total colectomy and ileostomy, 11/17    Encephalopathy, metabolic, fluctuating    Status post total colectomy    Generalized weakness    Bladder injury    Hypotension, acute on chronic    QT prolongation    Thrombocytopenia    S/P ureteral stent placement    ESRD on dialysis MWF    Bilateral LE neuropathy    Severe malnutrition    Hypothyroidism    Anemia of chronic disease, status post 1 unit PRBC 11/19    Hisotry of Vulvar cancer      Plan:    Case discussed with dr Martin and Dr. Broderick today. Patient getting 1 u of prbc today, input appreciated.  Case discussed with Dr Marquez today, patient likely has metabolic encephalopathy, input appreciated.  Seen by Urology,  today on 11/22/22 due to hematuria, recs: Godwin catheter in place at least 2 weeks,  cystogram prior to removal, will need stent removed in few months, follow-up with Dr. Barber. Input appreciated.  Continue care in ICU with continuous cardiac telemetry monitoring  Postop care as per General surgery   Ileostomy currently has output, placed on clear liquid diet however mentation precluding oral intake  Fluctuating mentation continues, encephalopathy labs reviewed, ammonia normal, CT head no acute, exam nonfocal, likely delirium  Continue TPN for nutrition     Anemia acute on chronic, status post 1 unit PRBC 11/19 and 11/22/22, trending, ADAM with dialysis  Continue Levophed, titrate for map greater than 60   Echocardiogram with EF of 75%   Continue p.r.n. antiemetic and analgesic  Bladder injury intraoperatively, keep Godwin catheter in place for at least 2 weeks  Renally dosing all medications and avoid nephrotoxin drugs  Fall and delirium precautions  A.m. labs ordered  Appreciate all consultant's input   Increase activity, PT/OT following     Consult Neurology given persistent encephalopathy  Further plan as per clinical course               VTE Risk Mitigation (From admission, onward)           Ordered     IP VTE HIGH RISK PATIENT  Once         11/16/22 1845     Place sequential compression device  Until discontinued         11/16/22 1845                        Department Hospital Medicine  [facility]  Carlitos Bowden MD  Date of service: 11/22/2022

## 2022-11-24 PROBLEM — Z51.5 COMFORT MEASURES ONLY STATUS: Status: ACTIVE | Noted: 2022-01-01

## 2022-11-24 NOTE — PROGRESS NOTES
INPATIENT NEPHROLOGY Progress Notes  [facility]    Patient Name: Shara Hutchins  Date: 11/24/2022    Reason for consultation: ESRD    Chief Complaint:   Chief Complaint   Patient presents with    Hypotension     Pt presents to the ER via EMS after being hypotensive at dialysis. Pt had systolic BP of 60 at dialysis.        History of Present Illness:  Ms. Hutchins is a 72-year-old female who was brought to the ED via EMS from outpatient dialysis center due to hypotension.  Patient with complicated recent history, about 2 months ago, admitted with ischemic colitis status post laparotomy with colostomy, stated she stayed in the hospital for about 1 month, she was discharge but had fall at assisted living facility and re-presented to the hospital following day, since then has had persistent hypotension, antihypertensives were discontinued, she was started on midodrine.  She was subsequently discharged to inpatient rehab and states she had returned to assisted living facility about 2 weeks ago.  Known history of ESRD on HD MWF.  She states blood pressure has been running low since previous admission, yesterday midodrine was increased from 5 mg t.i.d. to 10 mg t.i.d.. States her appetite has been low, over the last 24 hours has noticed decreased output from her colostomy, chronic nausea without any recent change, no significant increased abdominal pain.  States at dialysis today, near end of session, needle got dislodged from fistula, about 300 cc of blood loss, states following this she became lightheaded/dizzy, blood pressure 60/30 and she was referred to the ED.  States with dialysis recently they have not been removing fluid due to hypotension.  States she does have home health at assisted living facility.  In the ED blood pressure was low, 94/56, received fluid bolus, subsequently started on Levophed, during my encounter blood pressure 116/57 on Levophed 0.3 mcg/kg/min.  Patient is currently reporting  back pain.  Labs with WBC 11.36, hemoglobin 8.6, platelet 162, potassium 1.9, magnesium 1.2, albumin 2.2, lactic acid 2.2, BUN/creatinine 5/2.4.  EKG reviewed.  Chest x-ray clear.  Previous echo with EF of 70%.  She received acetaminophen, magnesium, potassium, 472 cc fluid bolus as well as cefepime in the ED. She was started on Levophed.  CTA abdomen and pelvis obtained with diffuse pneumatosis intestinal most prominent ascending, transverse and descending colon with portal vein gas.  She was started on broad-spectrum antibiotics.  General surgery was consulted and underwent emergent exploratory laparotomy with total colectomy, creation of ileostomy, inadvertent bladder injury requiring repair.  She was extubated postprocedure and transferred to the ICU.  Received 1 unit PRBC and FFP.  On 11/17 reports nausea, abdominal pain, still on Levophed. We are consulted for dialysis.    Interval History:  11/17- weaning pressors, stopped IVFs, can remove lee if ok with urology  11/18- still on levophed- reviewed urology note regarding lee- seen on HD- UF even  11/19- worsening mental status this AM- remains on levophed- weak today  11/20- still with AMS- worse today, off levophed, went for CT head- neg  11/21  encephalopathic.  AFVSS  11/22 remains encephalopathic.  Gross hematuria noted.   Off vasopressors   11/23  AFVSS.  S/P pRBC.  Encephalopathic   11/24  Had to stop hd due to ventricular tachycardia.    Plan of Care:    Assessment:  Ischemic colitis s/p laprotomy with total colectomy and ileostomy, 11/17  ESRD on HD MWF  Septic shock  Hyponatremia--better  Hypokalemia--better  Lactic acidosis--resoved  HyperMg  SHPT  Anemia of CKD  AMS    Plan:    - off pressors- continue lee per urology- dose antbx for CrCl < 10/HD  --Lee irrigation per Urology recommendation   - continue midodrine  - consulted RD for customized TPN- increase K and phos, remove Mg  - adjust dialysate  - BMM reviewed- no binder needs  -  transfused 1u on 11/19- Hb is better- continue ADAM with HD.  2 units pRBC transfused on 11/22 with supplemental dialysis  - CT head neg, ammonia and LFTs normal, lactate normal, WBC count improving-    --unable to tolerated dialysis yesterday.  Prognosis is abysmal.  I recommend comfort care measures          Addendum:  I was notified by hospital medicine that Ms. Hutchins is now on Comfort Care.  I will discontinue dialysis    Thank you for allowing us to participate in this patient's care. We will continue to follow.    Vital Signs:  Temp Readings from Last 3 Encounters:   11/24/22 98.6 °F (37 °C) (Axillary)   10/29/22 98.5 °F (36.9 °C) (Oral)   09/27/22 97.4 °F (36.3 °C)       Pulse Readings from Last 3 Encounters:   11/24/22 88   10/29/22 91   09/27/22 74       BP Readings from Last 3 Encounters:   11/24/22 (!) 111/55   10/29/22 (!) 105/58   09/27/22 101/61       Weight:  Wt Readings from Last 3 Encounters:   11/17/22 47.2 kg (104 lb 0.9 oz)   11/17/22 47.2 kg (104 lb)   10/29/22 50.8 kg (112 lb)       Medications:  Scheduled Meds:   sodium chloride 0.9%   Intravenous Once    epoetin nica-ebpx (RETACRIT) injection  100 Units/kg Intravenous Every Mon, Wed, Fri    midodrine  5 mg Oral TID    valproate sodium (DEPACON) IVPB  250 mg Intravenous Q8H     Continuous Infusions:   NORepinephrine bitartrate-D5W Stopped (11/22/22 0848)    TPN ADULT CENTRAL LINE CUSTOM (3 in 1) 50 mL/hr at 11/23/22 1707     PRN Meds:.sodium chloride, acetaminophen, calcium gluconate IVPB, calcium gluconate IVPB, calcium gluconate IVPB, dextrose 10%, dextrose 10%, glucagon (human recombinant), glucose, glucose, HYDROcodone-acetaminophen, HYDROmorphone, LIDOcaine HCl 2%, magnesium sulfate IVPB, magnesium sulfate IVPB, naloxone, ondansetron, potassium chloride in water **AND** potassium chloride in water **AND** potassium chloride in water, prochlorperazine, senna-docusate 8.6-50 mg, sodium chloride 0.9%, sodium chloride 0.9%, sodium phosphate  IVPB, sodium phosphate IVPB, sodium phosphate IVPB  Current Facility-Administered Medications on File Prior to Encounter   Medication Dose Route Frequency Provider Last Rate Last Admin    electrolyte-S (ISOLYTE)   Intravenous Continuous Raz Ruff MD        lactated ringers infusion   Intravenous Continuous Ruy Beltre MD   New Bag at 11/16/22 3498     Current Outpatient Medications on File Prior to Encounter   Medication Sig Dispense Refill    cholecalciferol, vitamin D3, (VITAMIN D3) 25 mcg (1,000 unit) capsule Take 1 capsule (1,000 Units total) by mouth once daily. 10 capsule 0    dronabinoL (MARINOL) 2.5 MG capsule Take 2.5 mg by mouth 2 (two) times daily before meals.      ergocalciferol (ERGOCALCIFEROL) 50,000 unit Cap Take 50,000 Units by mouth every 7 days. MONDAYS      ferric citrate (AURYXIA) 210 mg iron Tab Take 2 tablets by mouth 3 (three) times daily with meals.      gabapentin (NEURONTIN) 100 MG capsule Take 100 mg by mouth 2 (two) times daily.      hydrALAZINE (APRESOLINE) 50 MG tablet Take 1 tablet (50 mg total) by mouth every 8 (eight) hours. 270 tablet 0    hydrOXYzine HCL (ATARAX) 25 MG tablet Take 1 tablet (25 mg total) by mouth 3 (three) times daily as needed for Itching.      levothyroxine (SYNTHROID) 88 MCG tablet TAKE 1 TABLET BEFORE BREAKFAST (Patient taking differently: Take 88 mcg by mouth before breakfast. TAKE 1 TABLET BEFORE BREAKFAST) 90 tablet 3    LORazepam (ATIVAN) 0.5 MG tablet Take 1 tablet (0.5 mg total) by mouth every evening. 90 tablet 1    midodrine (PROAMATINE) 5 MG Tab Take 5 mg by mouth 3 (three) times daily.      mirtazapine (REMERON) 15 MG tablet Take 7.5 mg by mouth every evening.      promethazine (PHENERGAN) 25 MG tablet TAKE 1 TABLET (25 MG TOTAL) BY MOUTH 2 (TWO) TIMES DAILY AS NEEDED FOR NAUSEA. 60 tablet 0    rOPINIRole (REQUIP) 0.5 MG tablet Take 0.5 mg by mouth every 8 (eight) hours.      sertraline (ZOLOFT) 50 MG tablet Take 1 tablet (50 mg total) by  mouth every evening. Take 50 mg by mouth every evening. 90 tablet 1    simethicone (MYLICON) 80 MG chewable tablet Take 80 mg by mouth every 6 (six) hours as needed.      traMADoL (ULTRAM) 50 mg tablet Take 1 tablet by mouth 4 (four) times daily as needed.      zinc gluconate 50 mg tablet Take 50 mg by mouth once daily.      cholestyramine (QUESTRAN) 4 gram packet Take 4 g by mouth 2 (two) times daily.      diltiaZEM (CARDIZEM CD) 120 MG Cp24 Take 1 capsule (120 mg total) by mouth once daily. 90 capsule 3    ferrous sulfate (FEOSOL) 325 mg (65 mg iron) Tab tablet 325 mg.      oxyCODONE (OXY-IR) 5 mg Cap Take 5 mg by mouth.      oxyCODONE-acetaminophen (PERCOCET) 5-325 mg per tablet Take 1 tablet by mouth every 6 (six) hours as needed for Pain. 12 tablet 0    sevelamer carbonate (RENVELA) 800 mg Tab 800 mg.      zinc sulfate (ZINCATE) 50 mg zinc (220 mg) capsule Take 1 capsule by mouth once daily.      [DISCONTINUED] miconazole (MICOTIN) 2 % cream Apply topically 2 (two) times daily. 28 g 0     Review of Systems:  AMS    Physical Exam:  Constitutional: nad, aao x 0, nonverbal, grunting some  Heart: rrr, no m/r/g, wwp, no edema  Lungs: ctab, no w/r/r/c, no lb  Abdomen: s/nt/nd, +BS      Results:  Lab Results   Component Value Date     11/24/2022    K 4.3 11/24/2022     11/24/2022    CO2 29 11/24/2022    BUN 44 (H) 11/24/2022    CREATININE 3.1 (H) 11/24/2022    CALCIUM 7.9 (L) 11/24/2022    ANIONGAP 7 (L) 11/24/2022    ESTGFRAFRICA 12.5 (A) 07/20/2022    EGFRNONAA 10.9 (A) 07/20/2022       Lab Results   Component Value Date    CALCIUM 7.9 (L) 11/24/2022    PHOS 4.3 11/24/2022       Recent Labs   Lab 11/23/22  1102 11/23/22  2229   WBC 12.98*  --    RBC 3.23*  --    HGB 10.1*  10.1* 9.4*   HCT 30.5*  --    PLT 98*  --    MCV 94  --    MCH 31.3*  --    MCHC 33.1  --          I have personally reviewed pertinent radiological imaging and reports.    I have spent > 35 minutes providing care for this patient for  the above diagnoses. These services have included chart/data/imaging review, evaluation, exam, formulation of plan, , note preparation, and discussions with staff involved in this patient's care.    Raúl Dietz MD  Nephrology  [facility] [place]  [phone]

## 2022-11-24 NOTE — CARE UPDATE
wean   11/24/22 0700   PRE-TX-O2   O2 Device (Oxygen Therapy) Oxymask   Flow (L/min) 6   Pulse Oximetry Type Continuous   $ Pulse Oximetry - Multiple Charge Pulse Oximetry - Multiple   Education   $ Education DME Oxygen;15 min   Respiratory Evaluation   $ Care Plan Tech Time 15 min   $ Eval/Re-eval Charges Re-evaluation    oxygen as tolerated

## 2022-11-24 NOTE — PT/OT/SLP PROGRESS
Physical Therapy      Patient Name:  Shara Hutchins   MRN:  [id number]    Patient not seen today secondary to Nurse/ VANDANA hold. Will follow-up 11/25/22.

## 2022-11-24 NOTE — PROGRESS NOTES
[facility]  Adult Nutrition   Progress Note (Nutrition Support Management)    SUMMARY     Recommendations  Recommendation/Intervention:   1. New TPN ordered to be initiated @ 1700. Run @ 40 mL/hr.   2. Blood glucose and electrolytes to be monitored and corrected accordingly.   3. RD to monitor TPN infusion and tolerance. Monitor I/Os. Monitor and mange nutrition support daily.   4. Clear liquids to be provided by nursing as tolerated, diet to advance as medically able.     Goals: 1. Pt to meet estimated needs via TPN provision + PO intake. 2. Patient to tolerate diet. 3. BG and electrolytes to trend towards normal limits. 4. Transitional feeding from TPN to PO once oral intake is estimated to meet at least 60% of needs.  Nutrition Goal Status: progressing towards goal  Communication of RD Recs: reviewed with RN    Dietitian Rounds Brief  TPN to continue, new TPN ordered. Per MD's note, pt did not tolerate HD yesterday. Per RN, hospice to meet with patient's family tomorrow. RD lowered grams of protein, TPN rate and adjusted electrolytes since pt did not have HD.     PARENTERAL NUTRITION PROGRESS NOTE:    Parenteral Nutrition Day # 8  Diagnosis/Indication for PN: S/p total colectomy, malnutrition  IV Access: Percutaneous Central Line  Diet/Tube Feeding: trial clear liquids, ensure clear TID      Admixture Type: 3-in-1  Infusion Rate and Frequency: 40 mL/hr  Patient Acuity: ICU     PN Composition:   50grams Amino Acid, 196 grams Dextrose, and 60 grams Lipid.    Today's TPN provides 1466 kcal.    Diet order:   Current Diet Order: clear liquid - provided by nursing   Oral Nutrition Supplement: ensure clear TID  Current Nutrition Support Formula Ordered: Other (Comment) (custom 3-in-1 TPN)  Current Nutrition Support Rate Ordered: 50 (ml)  Current Nutrition Support Frequency Ordered: ml/hr    Evaluation of Received Nutrient/Fluid Intake  Energy Calories Required: not meeting needs  Protein Required: meeting  "needs  Tolerance: tolerating     % Intake of Estimated Energy Needs: 75 - 100 %  % Meal Intake: 0 - 25 %      Intake/Output Summary (Last 24 hours) at 11/24/2022 1103  Last data filed at 11/24/2022 0502  Gross per 24 hour   Intake 1195 ml   Output 250 ml   Net 945 ml        Anthropometrics  Temp: 98.5 °F (36.9 °C)  Height Method: Stated  Height: 5' 7" (170.2 cm)  Height (inches): 67 in  Weight Method: Bed Scale  Weight: 47.2 kg (104 lb 0.9 oz)  Weight (lb): 104.06 lb  Ideal Body Weight (IBW), Female: 135 lb  % Ideal Body Weight, Female (lb): 77.08 %  BMI (Calculated): 16.3  BMI Grade: 16 - 16.9 protein-energy malnutrition grade II       Estimated/Assessed Needs  Weight Used For Calorie Calculations: 47.2 kg (104 lb 0.9 oz)  Energy Calorie Requirements (kcal): 5486-9141 (35-40)  Energy Need Method: Kcal/kg  Protein Requirements: 57-71 (1.2-1.5 gm/kg)  Weight Used For Protein Calculations: 47.2 kg (104 lb 0.9 oz)  Fluid Requirements (mL): 24 hour UOP + 1000 mL     RDA Method (mL): 1652       Reason for Assessment  Reason For Assessment: new TPN, RD follow-up  Diagnosis: other (see comments) (Ischemic colitis s/p laprotomy with total colectomy and ileostomy)  Relevant Medical History: Vulvar cancer, hypotension, hypothyroidism, anemia of chronic disease, severe malnutrition, ESRD on dialysis MWF    Nutrition/Diet History  Food Allergies: NKFA  Factors Affecting Nutritional Intake: impaired cognitive status/motor control, altered gastrointestinal function, clear liquid diet    Nutrition Risk Screen  Nutrition Risk Screen: tube feeding or parenteral nutrition     MST Score: 0  Have you recently lost weight without trying?: No  Weight loss score: 0  Have you been eating poorly because of a decreased appetite?: No  Appetite score: 0       Weight History:  Wt Readings from Last 10 Encounters:   11/17/22 47.2 kg (104 lb 0.9 oz)   11/17/22 47.2 kg (104 lb)   10/29/22 50.8 kg (112 lb)   09/15/22 70.8 kg (156 lb)   09/08/22 " 70.9 kg (156 lb 3.2 oz)   08/30/22 69.4 kg (153 lb)   08/27/22 65.8 kg (145 lb)   08/22/22 67.6 kg (149 lb)   07/20/22 64.4 kg (142 lb)   07/08/22 67.4 kg (148 lb 11.2 oz)        Lab/Procedures/Meds: Pertinent Labs/Meds Reviewed    Medications:Pertinent Medications Reviewed  Scheduled Meds:   sodium chloride 0.9%   Intravenous Once    epoetin nica-ebpx (RETACRIT) injection  100 Units/kg Intravenous Every Mon, Wed, Fri    midodrine  5 mg Oral TID    valproate sodium (DEPACON) IVPB  250 mg Intravenous Q8H     Continuous Infusions:   NORepinephrine bitartrate-D5W Stopped (11/22/22 0848)    TPN ADULT CENTRAL LINE CUSTOM (3 in 1) 50 mL/hr at 11/23/22 1707    TPN ADULT CENTRAL LINE CUSTOM (3 in 1)       PRN Meds:.sodium chloride, acetaminophen, calcium gluconate IVPB, calcium gluconate IVPB, calcium gluconate IVPB, dextrose 10%, dextrose 10%, glucagon (human recombinant), glucose, glucose, HYDROcodone-acetaminophen, HYDROmorphone, LIDOcaine HCl 2%, magnesium sulfate IVPB, magnesium sulfate IVPB, naloxone, ondansetron, potassium chloride in water **AND** potassium chloride in water **AND** potassium chloride in water, prochlorperazine, senna-docusate 8.6-50 mg, sodium chloride 0.9%, sodium chloride 0.9%, sodium phosphate IVPB, sodium phosphate IVPB, sodium phosphate IVPB    Labs: Pertinent Labs Reviewed  Clinical Chemistry:  Recent Labs   Lab 11/22/22  0346 11/23/22  0350 11/23/22  1102 11/24/22  0329    140   < > 142   K 4.2 4.4   < > 4.3    106   < > 106   CO2 26 27   < > 29   * 133*   < > 160*   BUN 30* 27*   < > 44*   CREATININE 3.2* 2.3*   < > 3.1*   CALCIUM 7.9* 8.3*   < > 7.9*   PROT 4.2* 4.6*  --  4.3*   ALBUMIN 1.5* 1.6*  --  1.6*   BILITOT 2.3* 2.6*  --  2.2*   ALKPHOS 416* 422*  --  375*   AST 21 20  --  23   ALT 20 18  --  16   ANIONGAP 5* 7*   < > 7*   MG 2.3 2.0   < > 2.5   PHOS 3.4 3.8  --  4.3    < > = values in this interval not displayed.     CBC:   Recent Labs   Lab 11/23/22  1102  11/23/22  2229   WBC 12.98*  --    RBC 3.23*  --    HGB 10.1*  10.1* 9.4*   HCT 30.5*  --    PLT 98*  --    MCV 94  --    MCH 31.3*  --    MCHC 33.1  --      Lipid Panel:  Recent Labs   Lab 11/18/22  0400   TRIG 96     Cardiac Profile:  No results for input(s): BNP, CPK, CPKMB, TROPONINI, CKTOTAL in the last 168 hours.  Inflammatory Labs:  No results for input(s): CRP in the last 168 hours.  Diabetes:  No results for input(s): HGBA1C, POCTGLUCOSE in the last 168 hours.  Thyroid & Parathyroid:  Recent Labs   Lab 11/23/22  1110   TSH 5.710*   FREET4 0.94       Monitor and Evaluation  Food and Nutrient Intake: energy intake, food and beverage intake, parenteral nutrition intake  Food and Nutrient Adminstration: diet order, enteral and parenteral nutrition administration  Knowledge/Beliefs/Attitudes: food and nutrition knowledge/skill  Physical Activity and Function: nutrition-related ADLs and IADLs, factors affecting access to physical activity  Anthropometric Measurements: weight, weight change, body mass index  Biochemical Data, Medical Tests and Procedures: inflammatory profile, electrolyte and renal panel, gastrointestinal profile, lipid profile, glucose/endocrine profile  Nutrition-Focused Physical Findings: overall appearance     Nutrition Risk  Level of Risk/Frequency of Follow-up: high     Nutrition Follow-Up  RD Follow-up?: Yes      Shauna Mitchell, SHERIF 11/24/2022 11:03 AM

## 2022-11-24 NOTE — PLAN OF CARE
11/24/22 1300   Discharge Reassessment   Assessment Type Discharge Planning Reassessment   Did the patient's condition or plan change since previous assessment? Yes   Communicated RODRIGO with patient/caregiver Date not available/Unable to determine   Discharge Plan A Inpatient Hospice   Discharge Plan B Hospice/home   DME Needed Upon Discharge    (TBD)   Why the patient remains in the hospital Requires continued medical care   Post-Acute Status   Post-Acute Authorization Hospice   Discharge Delays (!) Post-Acute Set-up     Pt's discharge plan has been changed to inpatient hospice or hospice at home, depending on how Pt does over the next few days. LINWOOD reached out to Gerard at [facility] and he advised that she is more appropriate for inpatient hospice, which they are unable to do until Dec 1st. He did advise that if needed and appropriate to discharge Pt back to her assisted living for home hospice, he can come back and work with the family.     LINWOOD also reached out to [facility] and spoke with Kristin. LINWOOD sent over necessary paperwork via fax and she advised she would look over everything and keep me posted. She did advise that, given the holiday, they may not be able to come out and see her today or get her on services today but she would let me know either way.     I spoke with both MD and Dr. Mayes and they advised that they are going to be providing comfort measures for Pt at this time as they are aware the holiday may cause delays in services. Family is agreeable to hospice, per note from MD.     0214 update: [facility] is going to review for hospice GIP

## 2022-11-24 NOTE — PROGRESS NOTES
[facility] Medicine  Progress Note    Patient name: Shara Hutchins  MRN: [id number]  Admit Date: 11/16/2022   LOS: 7 days     SUBJECTIVE:     Principal problem: Ischemic colitis    Interval History:  Patient evaluated today during dialysis, she was having tachycardia and then started to have episodes of nonsustained vtac, we stop dialysis and give her Metoprolol, after that her heart rate went down to 80's and no more episodes of vtac occurred.      Scheduled Meds:   sodium chloride 0.9%   Intravenous Once    epoetin nica-ebpx (RETACRIT) injection  100 Units/kg Intravenous Every Mon, Wed, Fri    midodrine  5 mg Oral TID    valproate sodium (DEPACON) IVPB  250 mg Intravenous Q8H     Continuous Infusions:   NORepinephrine bitartrate-D5W Stopped (11/22/22 0848)    TPN ADULT CENTRAL LINE CUSTOM (3 in 1) 50 mL/hr at 11/23/22 1707     PRN Meds:sodium chloride, acetaminophen, calcium gluconate IVPB, calcium gluconate IVPB, calcium gluconate IVPB, dextrose 10%, dextrose 10%, glucagon (human recombinant), glucose, glucose, HYDROcodone-acetaminophen, HYDROmorphone, LIDOcaine HCl 2%, magnesium sulfate IVPB, magnesium sulfate IVPB, naloxone, ondansetron, potassium chloride in water **AND** potassium chloride in water **AND** potassium chloride in water, prochlorperazine, senna-docusate 8.6-50 mg, sodium chloride 0.9%, sodium chloride 0.9%, sodium phosphate IVPB, sodium phosphate IVPB, sodium phosphate IVPB    Review of patient's allergies indicates:   Allergen Reactions    Ciprofloxacin Other (See Comments)     Elevated liver enzymes      Pcn [penicillins] Anaphylaxis     TOLERATES ROCEPHIN WITHOUT DIFFICULTY       Review of Systems: As per interval history    OBJECTIVE:     Vital Signs (Most Recent)  Temp: 98.6 °F (37 °C) (11/23/22 1901)  Pulse: 100 (11/23/22 1915)  Resp: (!) 25 (11/23/22 1915)  BP: (!) 98/52 (11/23/22 1901)  SpO2: (!) 94 % (11/23/22 1915)    Vital Signs Range (Last 24H):  Temp:  [97.6 °F  (36.4 °C)-98.8 °F (37.1 °C)]   Pulse:  []   Resp:  [11-38]   BP: ()/(41-84)   SpO2:  [90 %-98 %]   Arterial Line BP: ()/(32-79)     I & O (Last 24H):  Intake/Output Summary (Last 24 hours) at 11/23/2022 2003  Last data filed at 11/23/2022 1824  Gross per 24 hour   Intake 1745 ml   Output 1150 ml   Net 595 ml          Physical Exam  Vitals and nursing note reviewed.   Constitutional:       Comments: Chronically and critically ill appearing, lying in bed   HENT:      Head: Normocephalic and atraumatic.      Mouth/Throat:      Mouth: Mucous membranes are moist.   Eyes:      General:         Right eye: No discharge.         Left eye: No discharge.   Neck:      Comments: LIJ in place  Cardiovascular:      Rate and Rhythm: Normal rate and regular rhythm.      Comments: Wearing SCDs  Pulmonary:      Comments: On NC, decreased at bases, no wheeze, no accessory muscle use  Abdominal:         Comments: ileostomy RLQ, surgical dressing in place, dark green output seen in ileostomy   Genitourinary:     Comments: Godwin in place  Skin:     Comments: LUE AVF. Edema bilateral hands R>L   Neurological:      Mental Status: She is alert.      Comments: Eyes open, appears to be moves all 4 extremities spontaneously but not to command, did not verbalize during my encounter             Laboratory:  I have reviewed all pertinent lab results within the past 24 hours.  CBC:   Recent Labs   Lab 11/23/22  1102   WBC 12.98*   RBC 3.23*   HGB 10.1*  10.1*   HCT 30.5*   PLT 98*   MCV 94   MCH 31.3*   MCHC 33.1     BMP:   Recent Labs   Lab 11/23/22  1102   *      K 4.1      CO2 27   BUN 26*   CREATININE 2.1*   CALCIUM 8.2*   MG 1.8     CMP:   Recent Labs   Lab 11/23/22  0350 11/23/22  1102   * 163*   CALCIUM 8.3* 8.2*   ALBUMIN 1.6*  --    PROT 4.6*  --     139   K 4.4 4.1   CO2 27 27    105   BUN 27* 26*   CREATININE 2.3* 2.1*   ALKPHOS 422*  --    ALT 18  --    AST 20  --    BILITOT 2.6*   --        Diagnostic Results:  Labs: Reviewed    ASSESSMENT/PLAN:         Active Hospital Problems    Diagnosis  POA    *Ischemic colitis s/p laprotomy with total colectomy and ileostomy, 11/17 [K55.9]  Yes    Other ventricular tachycardia [I47.29]  Unknown    Generalized weakness [R53.1]  Yes     Chronic    Encephalopathy, metabolic, fluctuating [G93.41]  Clinically Undetermined    Status post total colectomy [Z90.49]  Not Applicable    Bladder injury [S37.20XA]  No    Hypotension, acute on chronic [I95.9]  Yes    QT prolongation [R94.31]  Yes    Thrombocytopenia [D69.6]  Yes    S/P ureteral stent placement [Z96.0]  Not Applicable    ESRD on dialysis MWF [N18.6, Z99.2]  Not Applicable     Chronic    Bilateral LE neuropathy [G62.9]  Yes     Chronic    Severe malnutrition [E43]  Yes    Hypothyroidism [E03.9]  Yes     Chronic    Anemia of chronic disease, status post 1 unit PRBC 11/19 [D63.8]  Yes     Chronic    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 Patient s/p 2 u of prbc yesterday.  Consult Cardiology.  We will fgollow up Dr Jimmy cox, input appreciated.  Seen by Urology,  on 11/22/22 due to hematuria, recs: Godwin catheter in place at least 2 weeks, cystogram prior to removal, will need stent removed in few months, follow-up with Dr. Barber. Input appreciated.  Continue care in ICU with continuous cardiac telemetry monitoring  Postop care as per General surgery , input appreciated.  Ileostomy currently has output, placed on clear liquid diet however  mentation precluding oral intake  Fluctuating mentation continues, encephalopathy labs reviewed, ammonia normal, CT head no acute, exam nonfocal, likely delirium  Continue TPN for nutrition      Anemia acute on chronic, status post 1 unit PRBC 11/19 and 2 units on 11/22/22, trending, ADAM with dialysis  Echocardiogram with EF of 75%   Continue p.r.n. antiemetic and analgesic  Bladder injury intraoperatively, keep Godwin catheter in place for at least 2 weeks  Renally dosing all medications and avoid nephrotoxin drugs  Fall and delirium precautions  A.m. labs ordered  Appreciate all consultant's input   Increase activity, PT/OT following Further plan as per clinical course       Department Hospital Medicine  formerly [facility]  Carlitos Bowden MD  Date of service: 11/23/2022

## 2022-11-24 NOTE — PROGRESS NOTES
I discussed case with patients son and daughter in law, Julieta, I explained that patient did not tolerate dialysis yesterday, she is requiring more Oxygen today, hospice care has already been consulted as per their wishes, I explained that I discussed case with her Nephrologist (Dr Dietz) and critical care physician, Dr Mayes, and we agree that at this point patient will benefit from comfort care instead of aggressive curative care, she agrees and states she wants to pursue comfort care only from now on.

## 2022-11-24 NOTE — PLAN OF CARE
Problem: Adult Inpatient Plan of Care  Goal: Plan of Care Review  Outcome: Ongoing, Progressing  Goal: Patient-Specific Goal (Individualized)  Outcome: Ongoing, Progressing  Goal: Absence of Hospital-Acquired Illness or Injury  Outcome: Ongoing, Progressing  Goal: Optimal Comfort and Wellbeing  Outcome: Ongoing, Progressing  Goal: Readiness for Transition of Care  Outcome: Ongoing, Progressing     Problem: Adjustment to Illness (Sepsis/Septic Shock)  Goal: Optimal Coping  Outcome: Ongoing, Progressing     Problem: Bleeding (Sepsis/Septic Shock)  Goal: Absence of Bleeding  Outcome: Ongoing, Progressing     Problem: Glycemic Control Impaired (Sepsis/Septic Shock)  Goal: Blood Glucose Level Within Desired Range  Outcome: Ongoing, Progressing     Problem: Infection Progression (Sepsis/Septic Shock)  Goal: Absence of Infection Signs and Symptoms  Outcome: Ongoing, Progressing     Problem: Nutrition Impaired (Sepsis/Septic Shock)  Goal: Optimal Nutrition Intake  Outcome: Ongoing, Progressing     Problem: Fluid and Electrolyte Imbalance (Acute Kidney Injury/Impairment)  Goal: Fluid and Electrolyte Balance  Outcome: Ongoing, Progressing     Problem: Fluid Imbalance (Pneumonia)  Goal: Fluid Balance  Outcome: Ongoing, Progressing     Problem: Infection (Pneumonia)  Goal: Resolution of Infection Signs and Symptoms  Outcome: Ongoing, Progressing     Problem: Infection  Goal: Absence of Infection Signs and Symptoms  Outcome: Ongoing, Progressing     Problem: Impaired Wound Healing  Goal: Optimal Wound Healing  Outcome: Ongoing, Progressing     Problem: Fall Injury Risk  Goal: Absence of Fall and Fall-Related Injury  Outcome: Ongoing, Progressing     Problem: Skin Injury Risk Increased  Goal: Skin Health and Integrity  Outcome: Ongoing, Progressing     Problem: Parenteral Nutrition  Goal: Effective Intravenous Nutrition Therapy Delivery  Outcome: Ongoing, Progressing     Problem: Hemodynamic Instability (Hemodialysis)  Goal:  Effective Tissue Perfusion  Outcome: Ongoing, Progressing     Problem: Infection (Hemodialysis)  Goal: Absence of Infection Signs and Symptoms  Outcome: Ongoing, Progressing     Problem: Bleeding (Colostomy)  Goal: Absence of Bleeding  Outcome: Ongoing, Progressing     Problem: Fluid, Electrolyte and Nutrition Imbalance (Colostomy)  Goal: Fluid, Electrolyte and Nutrition Balance  Outcome: Ongoing, Progressing     Problem: Infection (Colostomy)  Goal: Absence of Infection Signs and Symptoms  Outcome: Ongoing, Progressing     Problem: Ongoing Anesthesia Effects (Colostomy)  Goal: Anesthesia/Sedation Recovery  Outcome: Ongoing, Progressing     Problem: Pain (Colostomy)  Goal: Acceptable Pain Control  Outcome: Ongoing, Progressing     Problem: Postoperative Nausea and Vomiting (Colostomy)  Goal: Nausea and Vomiting Relief  Outcome: Ongoing, Progressing     Problem: Postoperative Stoma Care (Colostomy)  Goal: Optimal Stoma Healing  Outcome: Ongoing, Progressing     Problem: Postoperative Urinary Retention (Colostomy)  Goal: Effective Urinary Elimination  Outcome: Ongoing, Progressing     Problem: Respiratory Compromise (Colostomy)  Goal: Effective Oxygenation and Ventilation  Outcome: Ongoing, Progressing

## 2022-11-24 NOTE — PROGRESS NOTES
Pt somnolent.  Minimal interaction.  Reported runs of vtach overnight.   Discussions about initiation of hospice care.    Wt Readings from Last 3 Encounters:   11/17/22 47.2 kg (104 lb 0.9 oz)   11/17/22 47.2 kg (104 lb)   10/29/22 50.8 kg (112 lb)     Temp Readings from Last 3 Encounters:   11/24/22 98.5 °F (36.9 °C) (Axillary)   10/29/22 98.5 °F (36.9 °C) (Oral)   09/27/22 97.4 °F (36.3 °C)     BP Readings from Last 3 Encounters:   11/24/22 (!) 163/56   10/29/22 (!) 105/58   09/27/22 101/61     Pulse Readings from Last 3 Encounters:   11/24/22 93   10/29/22 91   09/27/22 74     Coarse bS  Soft abd.   Ostomy viable      Lab Results   Component Value Date    WBC 12.98 (H) 11/23/2022    HGB 9.4 (L) 11/23/2022    HCT 30.5 (L) 11/23/2022    MCV 94 11/23/2022    PLT 98 (L) 11/23/2022       BMP  Lab Results   Component Value Date     11/24/2022    K 4.3 11/24/2022     11/24/2022    CO2 29 11/24/2022    BUN 44 (H) 11/24/2022    CREATININE 3.1 (H) 11/24/2022    CALCIUM 7.9 (L) 11/24/2022    ANIONGAP 7 (L) 11/24/2022    EGFRNORACEVR 15.4 (A) 11/24/2022     A/P: Hx of ischemic colitis  Having GI function  NOt alert to tolerate po intaake.  Nutrition via TPN/PPN  Medical mgmt of her continued medical problems  Poor prognosis

## 2022-11-25 NOTE — PROGRESS NOTES
[facility] Medicine  Progress Note    Patient name: Shara Hutchins  MRN: [id number]  Admit Date: 11/16/2022   LOS: 8 days     SUBJECTIVE:     Principal problem: Ischemic colitis    Interval History:  Patient hving increased Oxygen requirements.    Scheduled Meds:   sodium chloride 0.9%   Intravenous Once    epoetin nica-ebpx (RETACRIT) injection  100 Units/kg Intravenous Every Mon, Wed, Fri    midodrine  5 mg Oral TID    valproate sodium (DEPACON) IVPB  250 mg Intravenous Q8H     Continuous Infusions:   NORepinephrine bitartrate-D5W Stopped (11/22/22 0848)    TPN ADULT CENTRAL LINE CUSTOM (3 in 1) 40 mL/hr at 11/24/22 1747     PRN Meds:sodium chloride, acetaminophen, atropine 1%, calcium gluconate IVPB, calcium gluconate IVPB, calcium gluconate IVPB, dextrose 10%, dextrose 10%, glucagon (human recombinant), glucose, glucose, HYDROcodone-acetaminophen, HYDROmorphone, LIDOcaine HCl 2%, lorazepam, magnesium sulfate IVPB, magnesium sulfate IVPB, naloxone, ondansetron, potassium chloride in water **AND** potassium chloride in water **AND** potassium chloride in water, prochlorperazine, senna-docusate 8.6-50 mg, sodium chloride 0.9%, sodium chloride 0.9%, sodium phosphate IVPB, sodium phosphate IVPB, sodium phosphate IVPB    Review of patient's allergies indicates:   Allergen Reactions    Ciprofloxacin Other (See Comments)     Elevated liver enzymes      Pcn [penicillins] Anaphylaxis     TOLERATES ROCEPHIN WITHOUT DIFFICULTY       Review of Systems: As per interval history    OBJECTIVE:     Vital Signs (Most Recent)  Temp: 98 °F (36.7 °C) (11/24/22 1901)  Pulse: 95 (11/24/22 1901)  Resp: (!) 24 (11/24/22 1910)  BP: (!) 93/53 (11/24/22 1901)  SpO2: (!) 85 % (11/24/22 1901)    Vital Signs Range (Last 24H):  Temp:  [97.8 °F (36.6 °C)-98.6 °F (37 °C)]   Pulse:  []   Resp:  [15-37]   BP: ()/(38-65)   SpO2:  [85 %-96 %]     I & O (Last 24H):  Intake/Output Summary (Last 24 hours) at 11/24/2022  2033  Last data filed at 11/24/2022 1816  Gross per 24 hour   Intake --   Output 475 ml   Net -475 ml        Physical Exam  Vitals and nursing note reviewed.   Constitutional:       Comments: Chronically and critically ill appearing, lying in bed   HENT:      Head: Normocephalic and atraumatic.      Mouth/Throat:      Mouth: Mucous membranes are moist.   Eyes:      General:         Right eye: No discharge.         Left eye: No discharge.   Neck:      Comments: LIJ in place  Cardiovascular:      Rate and Rhythm: Normal rate and regular rhythm.      Comments: Wearing SCDs  Pulmonary:      Comments: On NC, decreased at bases, no wheeze, no accessory muscle use  Abdominal:         Comments: ileostomy RLQ, surgical dressing in place, dark green output seen in ileostomy   Genitourinary:     Comments: Godwin in place  Skin:     Comments: LUE AVF. Edema bilateral hands R>L   Neurological:      Mental Status: She is alert.      Comments: Eyes open, appears to be moves all 4 extremities spontaneously but not to command, did not verbalize during my encounter            Laboratory:  I have reviewed all pertinent lab results within the past 24 hours.  CBC:   Recent Labs   Lab 11/24/22  1408   WBC 14.03*   RBC 3.49*   HGB 11.0*   HCT 35.3*   PLT 86*   *   MCH 31.5*   MCHC 31.2*     BMP:   Recent Labs   Lab 11/24/22  0329   *      K 4.3      CO2 29   BUN 44*   CREATININE 3.1*   CALCIUM 7.9*   MG 2.5     CMP:   Recent Labs   Lab 11/24/22  0329   *   CALCIUM 7.9*   ALBUMIN 1.6*   PROT 4.3*      K 4.3   CO2 29      BUN 44*   CREATININE 3.1*   ALKPHOS 375*   ALT 16   AST 23   BILITOT 2.2*       Diagnostic Results:  Labs: Reviewed  ECG: Reviewed  X-Ray: Reviewed    ASSESSMENT/PLAN:         Active Hospital Problems    Diagnosis  POA    *Ischemic colitis s/p laprotomy with total colectomy and ileostomy, 11/17 [K55.9]  Yes    Comfort measures only status [Z51.5]  Not Applicable    Other  ventricular tachycardia [I47.29]  Unknown    Generalized weakness [R53.1]  Yes     Chronic    Encephalopathy, metabolic, fluctuating [G93.41]  Clinically Undetermined    Status post total colectomy [Z90.49]  Not Applicable    Bladder injury [S37.20XA]  No    Hypotension, acute on chronic [I95.9]  Yes    QT prolongation [R94.31]  Yes    Thrombocytopenia [D69.6]  Yes    S/P ureteral stent placement [Z96.0]  Not Applicable    ESRD on dialysis MWF [N18.6, Z99.2]  Not Applicable     Chronic    Bilateral LE neuropathy [G62.9]  Yes     Chronic    Severe malnutrition [E43]  Yes    Hypothyroidism [E03.9]  Yes     Chronic    Anemia of chronic disease, status post 1 unit PRBC 11/19 [D63.8]  Yes     Chronic    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              Department Hospital Medicine  [facility]  Carlitos Bowden MD  Date of service: 11/24/2022

## 2022-11-25 NOTE — PT/OT/SLP PROGRESS
Physical Therapy      Patient Name:  Shara Hutchins   MRN:  [id number]    Patient not seen today secondary to Nurse/ VANDANA hold (RN indicated patient should be discharged from inpatient PT due to patient on  comfort measures only.).

## 2022-11-25 NOTE — CARE UPDATE
11/24/22 2221   Patient Assessment/Suction   Level of Consciousness (AVPU) responds to voice   Respiratory Effort Unlabored   Expansion/Accessory Muscles/Retractions no use of accessory muscles   All Lung Fields Breath Sounds coarse;crackles   Rhythm/Pattern, Respiratory no shortness of breath reported   Cough Frequency no cough   PRE-TX-O2   O2 Device (Oxygen Therapy) Oxymask   $ Is the patient on Low Flow Oxygen? Yes   Flow (L/min) 10   SpO2 (!) 94 %   Pulse Oximetry Type Continuous   $ Pulse Oximetry - Multiple Charge Pulse Oximetry - Multiple   Pulse 101   Resp 20   Positioning   Head of Bed (HOB) Positioning HOB elevated;HOB at 30 degrees   Education   $ Education Bronchodilator;15 min   Respiratory Evaluation   $ Care Plan Tech Time 15 min

## 2022-11-25 NOTE — PROGRESS NOTES
On venti mask  Abdomen non tender or distended  Ostomy viable with output    A/P  ischemic colitis- sp completion colectomy emergently  Not recovering  Agree dismal outlook  Comfort care in place  Will sign off

## 2022-11-25 NOTE — PLAN OF CARE
11/25/22 0836   Post-Acute Status   Post-Acute Authorization Hospice   Hospice Status Pending post acute provider review/more information requested     CM noted hospice consult and SW faxed patient's clinicals to [facility]. SARAH sent secure chat to Dr. Monzon inquiring if patient will be assessed for GIP today. CM awaiting response at this time.     0958 - SARAH received call from [employer] with [facility] who stated that Dr. Monzon will not be back into town until Monday to evaluate patient for inpatient hospice. [facility] is meeting with the patient's family today to discuss hospice at home. CM awaiting return call from [employer] updating on family meeting. Dr. Bowden notified of above via secure chat.     1330 during daily rounds, patient noted to be extremely air hungry. Patient's O2 sats were 74% on oxymask. Patient noted to be moaning and restless. Dr. Bowden notified of above and CM requesting morphine for air hunger. Orders placed and primary nurse notified.     No family at bedside at this time. CM left voicemail for patient's family regarding hospice and current POC. CM following.

## 2022-11-25 NOTE — PROGRESS NOTES
INPATIENT NEPHROLOGY Progress Notes  [facility]    Patient Name: Shara Hutchins  Date: 11/25/2022    Reason for consultation: ESRD    Chief Complaint:   Chief Complaint   Patient presents with    Hypotension     Pt presents to the ER via EMS after being hypotensive at dialysis. Pt had systolic BP of 60 at dialysis.        History of Present Illness:  Ms. Hutchins is a 72-year-old female who was brought to the ED via EMS from outpatient dialysis center due to hypotension.  Patient with complicated recent history, about 2 months ago, admitted with ischemic colitis status post laparotomy with colostomy, stated she stayed in the hospital for about 1 month, she was discharge but had fall at assisted living facility and re-presented to the hospital following day, since then has had persistent hypotension, antihypertensives were discontinued, she was started on midodrine.  She was subsequently discharged to inpatient rehab and states she had returned to assisted living facility about 2 weeks ago.  Known history of ESRD on HD MWF.  She states blood pressure has been running low since previous admission, yesterday midodrine was increased from 5 mg t.i.d. to 10 mg t.i.d.. States her appetite has been low, over the last 24 hours has noticed decreased output from her colostomy, chronic nausea without any recent change, no significant increased abdominal pain.  States at dialysis today, near end of session, needle got dislodged from fistula, about 300 cc of blood loss, states following this she became lightheaded/dizzy, blood pressure 60/30 and she was referred to the ED.  States with dialysis recently they have not been removing fluid due to hypotension.  States she does have home health at assisted living facility.  In the ED blood pressure was low, 94/56, received fluid bolus, subsequently started on Levophed, during my encounter blood pressure 116/57 on Levophed 0.3 mcg/kg/min.  Patient is currently reporting  back pain.  Labs with WBC 11.36, hemoglobin 8.6, platelet 162, potassium 1.9, magnesium 1.2, albumin 2.2, lactic acid 2.2, BUN/creatinine 5/2.4.  EKG reviewed.  Chest x-ray clear.  Previous echo with EF of 70%.  She received acetaminophen, magnesium, potassium, 472 cc fluid bolus as well as cefepime in the ED. She was started on Levophed.  CTA abdomen and pelvis obtained with diffuse pneumatosis intestinal most prominent ascending, transverse and descending colon with portal vein gas.  She was started on broad-spectrum antibiotics.  General surgery was consulted and underwent emergent exploratory laparotomy with total colectomy, creation of ileostomy, inadvertent bladder injury requiring repair.  She was extubated postprocedure and transferred to the ICU.  Received 1 unit PRBC and FFP.  On 11/17 reports nausea, abdominal pain, still on Levophed. We are consulted for dialysis.    Interval History:  11/17- weaning pressors, stopped IVFs, can remove lee if ok with urology  11/18- still on levophed- reviewed urology note regarding lee- seen on HD- UF even  11/19- worsening mental status this AM- remains on levophed- weak today  11/20- still with AMS- worse today, off levophed, went for CT head- neg  11/21  encephalopathic.  AFVSS  11/22 remains encephalopathic.  Gross hematuria noted.   Off vasopressors   11/23  AFVSS.  S/P pRBC.  Encephalopathic   11/24  Had to stop hd due to ventricular tachycardia.  11/25  pt not tolerating dialysis, has elected comfort care.  Awaiting hospice eval.  D/c'd HD orders.    Plan of Care:    Assessment:  Ischemic colitis s/p laprotomy with total colectomy and ileostomy, 11/17  ESRD on HD MWF  Septic shock  Hyponatremia--better  Hypokalemia--better  Lactic acidosis--resoved  HyperMg  SHPT  Anemia of CKD  AMS    Plan:    - off pressors- continue lee per urology- dose antbx for CrCl < 10/HD  --Lee irrigation per Urology recommendation   - continue midodrine  - on TPN  - BMM reviewed-  no binder needs  - transfused 1u on 11/19- Hb is better-  2 units pRBC transfused on 11/22 with supplemental dialysis  - CT head neg, ammonia and LFTs normal, lactate normal, WBC count improving-    --unable to tolerated dialysis yesterday.  Prognosis is abysmal.  I recommend comfort care measures              Thank you for allowing us to participate in this patient's care. We will continue to follow.    Vital Signs:  Temp Readings from Last 3 Encounters:   11/25/22 98.1 °F (36.7 °C)   10/29/22 98.5 °F (36.9 °C) (Oral)   09/27/22 97.4 °F (36.3 °C)       Pulse Readings from Last 3 Encounters:   11/25/22 (!) 112   10/29/22 91   09/27/22 74       BP Readings from Last 3 Encounters:   11/25/22 108/68   10/29/22 (!) 105/58   09/27/22 101/61       Weight:  Wt Readings from Last 3 Encounters:   11/17/22 47.2 kg (104 lb 0.9 oz)   11/17/22 47.2 kg (104 lb)   10/29/22 50.8 kg (112 lb)       Medications:  Scheduled Meds:   sodium chloride 0.9%   Intravenous Once    epoetin nica-ebpx (RETACRIT) injection  100 Units/kg Intravenous Every Mon, Wed, Fri    midodrine  5 mg Oral TID    valproate sodium (DEPACON) IVPB  250 mg Intravenous Q8H     Continuous Infusions:   NORepinephrine bitartrate-D5W Stopped (11/22/22 0848)    TPN ADULT CENTRAL LINE CUSTOM (3 in 1) 40 mL/hr at 11/24/22 1747     PRN Meds:.sodium chloride, acetaminophen, atropine 1%, calcium gluconate IVPB, calcium gluconate IVPB, calcium gluconate IVPB, dextrose 10%, dextrose 10%, glucagon (human recombinant), glucose, glucose, HYDROcodone-acetaminophen, HYDROmorphone, LIDOcaine HCl 2%, lorazepam, magnesium sulfate IVPB, magnesium sulfate IVPB, naloxone, ondansetron, potassium chloride in water **AND** potassium chloride in water **AND** potassium chloride in water, prochlorperazine, senna-docusate 8.6-50 mg, sodium chloride 0.9%, sodium chloride 0.9%, sodium phosphate IVPB, sodium phosphate IVPB, sodium phosphate IVPB  Current Facility-Administered Medications on File  Prior to Encounter   Medication Dose Route Frequency Provider Last Rate Last Admin    electrolyte-S (ISOLYTE)   Intravenous Continuous Raz Ruff MD        lactated ringers infusion   Intravenous Continuous Ruy Beltre MD   New Bag at 11/16/22 1473     Current Outpatient Medications on File Prior to Encounter   Medication Sig Dispense Refill    cholecalciferol, vitamin D3, (VITAMIN D3) 25 mcg (1,000 unit) capsule Take 1 capsule (1,000 Units total) by mouth once daily. 10 capsule 0    dronabinoL (MARINOL) 2.5 MG capsule Take 2.5 mg by mouth 2 (two) times daily before meals.      ergocalciferol (ERGOCALCIFEROL) 50,000 unit Cap Take 50,000 Units by mouth every 7 days. MONDAYS      ferric citrate (AURYXIA) 210 mg iron Tab Take 2 tablets by mouth 3 (three) times daily with meals.      gabapentin (NEURONTIN) 100 MG capsule Take 100 mg by mouth 2 (two) times daily.      hydrALAZINE (APRESOLINE) 50 MG tablet Take 1 tablet (50 mg total) by mouth every 8 (eight) hours. 270 tablet 0    hydrOXYzine HCL (ATARAX) 25 MG tablet Take 1 tablet (25 mg total) by mouth 3 (three) times daily as needed for Itching.      levothyroxine (SYNTHROID) 88 MCG tablet TAKE 1 TABLET BEFORE BREAKFAST (Patient taking differently: Take 88 mcg by mouth before breakfast. TAKE 1 TABLET BEFORE BREAKFAST) 90 tablet 3    LORazepam (ATIVAN) 0.5 MG tablet Take 1 tablet (0.5 mg total) by mouth every evening. 90 tablet 1    midodrine (PROAMATINE) 5 MG Tab Take 5 mg by mouth 3 (three) times daily.      mirtazapine (REMERON) 15 MG tablet Take 7.5 mg by mouth every evening.      promethazine (PHENERGAN) 25 MG tablet TAKE 1 TABLET (25 MG TOTAL) BY MOUTH 2 (TWO) TIMES DAILY AS NEEDED FOR NAUSEA. 60 tablet 0    rOPINIRole (REQUIP) 0.5 MG tablet Take 0.5 mg by mouth every 8 (eight) hours.      sertraline (ZOLOFT) 50 MG tablet Take 1 tablet (50 mg total) by mouth every evening. Take 50 mg by mouth every evening. 90 tablet 1    simethicone (MYLICON) 80 MG  chewable tablet Take 80 mg by mouth every 6 (six) hours as needed.      traMADoL (ULTRAM) 50 mg tablet Take 1 tablet by mouth 4 (four) times daily as needed.      zinc gluconate 50 mg tablet Take 50 mg by mouth once daily.      cholestyramine (QUESTRAN) 4 gram packet Take 4 g by mouth 2 (two) times daily.      diltiaZEM (CARDIZEM CD) 120 MG Cp24 Take 1 capsule (120 mg total) by mouth once daily. 90 capsule 3    ferrous sulfate (FEOSOL) 325 mg (65 mg iron) Tab tablet 325 mg.      oxyCODONE (OXY-IR) 5 mg Cap Take 5 mg by mouth.      oxyCODONE-acetaminophen (PERCOCET) 5-325 mg per tablet Take 1 tablet by mouth every 6 (six) hours as needed for Pain. 12 tablet 0    sevelamer carbonate (RENVELA) 800 mg Tab 800 mg.      zinc sulfate (ZINCATE) 50 mg zinc (220 mg) capsule Take 1 capsule by mouth once daily.      [DISCONTINUED] miconazole (MICOTIN) 2 % cream Apply topically 2 (two) times daily. 28 g 0     Review of Systems:  AMS    Physical Exam:  Constitutional: nad, aao x 0, nonverbal, grunting some  Heart: rrr, no m/r/g, wwp, no edema  Lungs: ctab, no w/r/r/c, no lb  Abdomen: s/nt/nd, +BS      Results:  Lab Results   Component Value Date     11/24/2022    K 4.3 11/24/2022     11/24/2022    CO2 29 11/24/2022    BUN 44 (H) 11/24/2022    CREATININE 3.1 (H) 11/24/2022    CALCIUM 7.9 (L) 11/24/2022    ANIONGAP 7 (L) 11/24/2022    ESTGFRAFRICA 12.5 (A) 07/20/2022    EGFRNONAA 10.9 (A) 07/20/2022       Lab Results   Component Value Date    CALCIUM 7.9 (L) 11/24/2022    PHOS 4.3 11/24/2022       Recent Labs   Lab 11/24/22  1408   WBC 14.03*   RBC 3.49*   HGB 11.0*   HCT 35.3*   PLT 86*   *   MCH 31.5*   MCHC 31.2*         I have personally reviewed pertinent radiological imaging and reports.    I have spent > 35 minutes providing care for this patient for the above diagnoses. These services have included chart/data/imaging review, evaluation, exam, formulation of plan, , note preparation, and  discussions with staff involved in this patient's care.    Raúl Dietz MD  Nephrology  [facility] [place]  [phone]

## 2022-11-26 NOTE — PROGRESS NOTES
[facility] Medicine  Progress Note    Patient name: Shara Hutchins  MRN: [id number]  Admit Date: 2022   LOS: 9 days     SUBJECTIVE:     Principal problem: Ischemic colitis    Interval History:  Patient requiring higher dose of Morphine and Ativan for comfort, I examined her twice today, she ws comofrtable after my second evaluation, I disussed  with family on both occasions, difernet family members.    Scheduled Meds:   sodium chloride 0.9%   Intravenous Once    epoetin nica-ebpx (RETACRIT) injection  100 Units/kg Intravenous Every Mon, Wed, Fri    valproate sodium (DEPACON) IVPB  250 mg Intravenous Q8H     Continuous Infusions:  PRN Meds:sodium chloride, acetaminophen, atropine 1%, calcium gluconate IVPB, calcium gluconate IVPB, calcium gluconate IVPB, dextrose 10%, dextrose 10%, glucagon (human recombinant), glucose, glucose, LIDOcaine HCl 2%, lorazepam, magnesium sulfate IVPB, magnesium sulfate IVPB, morphine, naloxone, ondansetron, potassium chloride in water **AND** potassium chloride in water **AND** potassium chloride in water, prochlorperazine, senna-docusate 8.6-50 mg, sodium chloride 0.9%, sodium chloride 0.9%, sodium phosphate IVPB, sodium phosphate IVPB, sodium phosphate IVPB    Review of patient's allergies indicates:   Allergen Reactions    Ciprofloxacin Other (See Comments)     Elevated liver enzymes      Pcn [penicillins] Anaphylaxis     TOLERATES ROCEPHIN WITHOUT DIFFICULTY       Review of Systems: As per interval history    OBJECTIVE:     Vital Signs (Most Recent)  Temp: 98.6 °F (37 °C) (22 1126)  Pulse: 103 (22 1126)  Resp: (!) 24 (22 1748)  BP: (!) 100/45 (22 1126)  SpO2: (!) 75 % (22 1730)    Vital Signs Range (Last 24H):  Temp:  [97.8 °F (36.6 °C)-98.6 °F (37 °C)]   Pulse:  []   Resp:  [20-48]   BP: ()/(40-68)   SpO2:  [75 %-94 %]     I & O (Last 24H):  Intake/Output Summary (Last 24 hours) at 2022 1853  Last data  filed at 11/25/2022 1049  Gross per 24 hour   Intake --   Output 600 ml   Net -600 ml            Physical Exam  Vitals and nursing note reviewed.   Constitutional:       Comments: Chronically and critically ill appearing, lying in bed   HENT:      Head: Normocephalic and atraumatic.      Mouth/Throat:      Mouth: Mucous membranes are moist.   Eyes:      General:         Right eye: No discharge.         Left eye: No discharge.   Neck:      Comments: LIJ in place  Cardiovascular:      Rate and Rhythm: Normal rate and regular rhythm.        Pulmonary:      Comments:  decreased at bases, no wheeze, no accessory muscle use  Abdominal:         Comments: ileostomy RLQ, surgical dressing in place, dark green output seen in ileostomy   Genitourinary:     Comments: Godwni in place  Skin:     Comments: LUE AVF. Edema bilateral hands R>L   Neurological:      Mental Status: She is alert.      Comments: Eyes open, appears to be moves all 4 extremities spontaneously but not to command, did not verbalize during my encounter             Laboratory:  I have reviewed all pertinent lab results within the past 24 hours.  CBC:   Recent Labs   Lab 11/24/22  1408   WBC 14.03*   RBC 3.49*   HGB 11.0*   HCT 35.3*   PLT 86*   *   MCH 31.5*   MCHC 31.2*     BMP:   Recent Labs   Lab 11/24/22  0329   *      K 4.3      CO2 29   BUN 44*   CREATININE 3.1*   CALCIUM 7.9*   MG 2.5     CMP:   Recent Labs   Lab 11/24/22  0329   *   CALCIUM 7.9*   ALBUMIN 1.6*   PROT 4.3*      K 4.3   CO2 29      BUN 44*   CREATININE 3.1*   ALKPHOS 375*   ALT 16   AST 23   BILITOT 2.2*       ASSESSMENT/PLAN:         Active Hospital Problems    Diagnosis  POA    *Ischemic colitis s/p laprotomy with total colectomy and ileostomy, 11/17 [K55.9]  Yes    Comfort measures only status [Z51.5]  Not Applicable    Other ventricular tachycardia [I47.29]  Unknown    Generalized weakness [R53.1]  Yes     Chronic    Encephalopathy,  metabolic, fluctuating [G93.41]  Clinically Undetermined    Status post total colectomy [Z90.49]  Not Applicable    Bladder injury [S37.20XA]  No    Hypotension, acute on chronic [I95.9]  Yes    QT prolongation [R94.31]  Yes    Thrombocytopenia [D69.6]  Yes    S/P ureteral stent placement [Z96.0]  Not Applicable    ESRD on dialysis MWF [N18.6, Z99.2]  Not Applicable     Chronic    Bilateral LE neuropathy [G62.9]  Yes     Chronic    Severe malnutrition [E43]  Yes    Hypothyroidism [E03.9]  Yes     Chronic    Anemia of chronic disease, status post 1 unit PRBC 11/19 [D63.8]  Yes     Chronic    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   working on hospice      VTE Risk Mitigation (From admission, onward)           Ordered     IP VTE HIGH RISK PATIENT  Once         11/16/22 1845     Place sequential compression device  Until discontinued         11/16/22 1845                        Department Hospital Medicine  [facility]  Carlitos Bowden MD  Date of service: 11/25/2022

## 2022-11-26 NOTE — NURSING
MD Whitney notified of patient's o2 saturation of 69-70% with no improvement. Discussed patient's status over phone, no further interventions instructed.

## 2022-11-26 NOTE — CARE UPDATE
11/26/22 1037   Patient Assessment/Suction   Level of Consciousness (AVPU) responds to pain   Respiratory Effort Unlabored   Expansion/Accessory Muscles/Retractions expansion symmetric   All Lung Fields Breath Sounds coarse   Rhythm/Pattern, Respiratory unlabored   PRE-TX-O2   O2 Device (Oxygen Therapy) Simple Face Mask  (WITH HUMIDITY)   $ Is the patient on Low Flow Oxygen? Yes   Flow (L/min) 12   Oxygen Concentration (%) 98   SpO2 (!) 94 %   Pulse Oximetry Type Continuous   $ Pulse Oximetry - Multiple Charge Pulse Oximetry - Multiple   Pulse 104   Resp 20   Education   $ Education Other (see comment);15 min  (SATS)   Respiratory Evaluation   $ Care Plan Tech Time 15 min   $ Eval/Re-eval Charges Re-evaluation

## 2022-11-26 NOTE — PROGRESS NOTES
[facility] Medicine  Progress Note    Patient name: Shara Hutchins  MRN: [id number]  Admit Date: 11/16/2022   LOS: 10 days     SUBJECTIVE:     Principal problem: Ischemic colitis    Interval History:  Patient seen twice today. Looks comfortable, getting Morphine and Ativan PRN.    Scheduled Meds:   sodium chloride 0.9%   Intravenous Once    epoetin nica-ebpx (RETACRIT) injection  100 Units/kg Intravenous Every Mon, Wed, Fri     Continuous Infusions:  PRN Meds:sodium chloride, acetaminophen, acetaminophen, atropine 1%, calcium gluconate IVPB, calcium gluconate IVPB, calcium gluconate IVPB, dextrose 10%, dextrose 10%, glucagon (human recombinant), glucose, glucose, LIDOcaine HCl 2%, lorazepam, magnesium sulfate IVPB, magnesium sulfate IVPB, morphine, naloxone, ondansetron, potassium chloride in water **AND** potassium chloride in water **AND** potassium chloride in water, prochlorperazine, senna-docusate 8.6-50 mg, sodium chloride 0.9%, sodium chloride 0.9%, sodium phosphate IVPB, sodium phosphate IVPB, sodium phosphate IVPB    Review of patient's allergies indicates:   Allergen Reactions    Ciprofloxacin Other (See Comments)     Elevated liver enzymes      Pcn [penicillins] Anaphylaxis     TOLERATES ROCEPHIN WITHOUT DIFFICULTY       Review of Systems: As per interval history    OBJECTIVE:     Vital Signs (Most Recent)  Temp: (!) 100.6 °F (38.1 °C) (11/26/22 1018)  Pulse: 104 (11/26/22 1037)  Resp: 18 (11/26/22 1336)  BP: (!) 81/46 (11/25/22 1917)  SpO2: (!) 94 % (11/26/22 1037)    Vital Signs Range (Last 24H):  Temp:  [98.1 °F (36.7 °C)-100.6 °F (38.1 °C)]   Pulse:  [102-105]   Resp:  [16-24]   BP: (81)/(46)   SpO2:  [75 %-95 %]     I & O (Last 24H):  Intake/Output Summary (Last 24 hours) at 11/26/2022 1627  Last data filed at 11/26/2022 0546  Gross per 24 hour   Intake 0 ml   Output 300 ml   Net -300 ml       Physical Exam  Vitals and nursing note reviewed.   Constitutional:       Comments:  Chronically and critically ill appearing, lying in bed   HENT:      Head: Normocephalic and atraumatic.      Mouth/Throat:      Mouth: Mucous membranes are moist.   Eyes:      General:         Right eye: No discharge.         Left eye: No discharge.   Neck:      Comments: LIJ in place  Cardiovascular:      Rate and Rhythm: Normal rate and regular rhythm.        Pulmonary:      Comments:  decreased at bases, no wheeze, no accessory muscle use  Abdominal:         Comments: ileostomy RLQ, surgical dressing in place, dark green output seen in ileostomy   Genitourinary:     Comments: Godwin in place  Skin:     Comments: LUE AVF. Edema bilateral hands R>L       ASSESSMENT/PLAN:                Active Hospital Problems     Diagnosis   POA    *Ischemic colitis s/p laprotomy with total colectomy and ileostomy, 11/17 [K55.9]   Yes    Comfort measures only status [Z51.5]   Not Applicable    Other ventricular tachycardia [I47.29]   Unknown    Generalized weakness [R53.1]   Yes       Chronic    Encephalopathy, metabolic, fluctuating [G93.41]   Clinically Undetermined    Status post total colectomy [Z90.49]   Not Applicable    Bladder injury [S37.20XA]   No    Hypotension, acute on chronic [I95.9]   Yes    QT prolongation [R94.31]   Yes    Thrombocytopenia [D69.6]   Yes    S/P ureteral stent placement [Z96.0]   Not Applicable    ESRD on dialysis MWF [N18.6, Z99.2]   Not Applicable       Chronic    Bilateral LE neuropathy [G62.9]   Yes       Chronic    Severe malnutrition [E43]   Yes    Hypothyroidism [E03.9]   Yes       Chronic    Anemia of chronic disease, status post 1 unit PRBC 11/19 [D63.8]   Yes       Chronic    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   working on hospice        VTE Risk Mitigation (From admission, onward)              Ordered       IP VTE HIGH RISK PATIENT  Once         11/16/22 1845       Place sequential compression device  Until discontinued         11/16/22 1845                                         Department Hospital Medicine  [facility]  Carlitos Bowden MD  Date of service: 11/26/2022

## 2022-11-27 NOTE — PROGRESS NOTES
Apical/radial/carotid pulse (0) bpm; LS absent and RR (0) per auscultation ; unable to obtain BP and pulse ox; MD notified to pronounce time of death; family at bedside; safety intact with assessment ongoing

## 2022-11-27 NOTE — PLAN OF CARE
22 1556   Final Note   Assessment Type Final Discharge Note   Anticipated Discharge Disposition        Patient  in hospital

## 2022-11-27 NOTE — PROGRESS NOTES
Follow up with [facility], they state pt is cleared for released as far as donation is concerned since [facility] states pt is disqualified for eye donation (see previous nursing notes)

## 2022-11-27 NOTE — NURSING
Spoke with coroners r/t pts passing; all pertinent info provide; Pt cleared for release by coroners office

## 2022-11-27 NOTE — PROGRESS NOTES
Spoke with Alondra Messer of [facility], pt is disqualified for eye donation r/t reactive Hep B S Ab test earlier this year (see results follow sheet of 1/17/22@6589)

## 2022-11-27 NOTE — PROGRESS NOTES
I was called by RN to pronounce Ms. Hutchins   She has no spontaneous movements does not respond to external stimuli pupils are mid fix nonreactive to light   No pulse is palpable   No heart or breathing sounds are heard on auscultation    I pronounced her at 10:56 a.m. on 11/27/2022.

## 2022-11-27 NOTE — PROGRESS NOTES
RUFINA notified of pts passing per protocol; safety intact with assessment ongoing, RUFINA to call daughter;

## 2022-11-28 NOTE — PT/OT/SLP DISCHARGE
Occupational Therapy Discharge Summary    Shara Hutchins  MRN: [id number]   Principal Problem: Ischemic colitis      Patient Discharged from acute Occupational Therapy on 2022.      Assessment:       Patient has .    Objective:     GOALS:   Multidisciplinary Problems       Occupational Therapy Goals          Problem: Occupational Therapy    Goal Priority Disciplines Outcome Interventions   Occupational Therapy Goal     OT, PT/OT     Description: Goals to be met by: 22     Patient will increase functional independence with ADLs by performing:    UE Dressing with Stand-by Assistance.  LE Dressing with Minimal Assistance and Assistive Devices as needed.  Grooming while standing at sink with Minimal Assistance.  Toileting from toilet with Minimal Assistance for hygiene and clothing management.   Toilet transfer to toilet with Minimal Assistance.                         Reasons for Discontinuation of Therapy Services  Patient has .       Plan:     Patient Discharged to:     2022

## 2022-11-28 NOTE — DISCHARGE SUMMARY
[facility]  Discharge Summary  Patient Name: Shara Hutchins MRN: [id number]   Patient Class: IP- Inpatient  Length of Stay: 11   Admission Date: 2022  1:00 PM Attending Physician: Carlitos Bowden MD   Primary Care Provider: April Horton MD Face-to-Face encounter date: 2022   Chief Complaint: Hypotension (Pt presents to the ER via EMS after being hypotensive at dialysis. Pt had systolic BP of 60 at dialysis. )    Date of Discharge: 2022  Discharge Disposition:   Condition: Stable       Brief History of Present Illness        Interval History:  72-year-old female who was brought to the ED via EMS from outpatient dialysis center due to hypotension.  Patient with complicated recent history, about 2 months ago, admitted with ischemic colitis status post laparotomy with colostomy, stated she stayed in the hospital for about 1 month, she was discharge but had fall at assisted living facility and re-presented to the hospital following day, since then has had persistent hypotension, antihypertensives were discontinued, she was started on midodrine.  She was subsequently discharged to inpatient rehab and states she had returned to assisted living facility about 2 weeks ago.  Known history of ESRD on HD MWF.  She states blood pressure has been running low since previous admission, yesterday midodrine was increased from 5 mg t.i.d. to 10 mg t.i.d..  States her appetite has been low, over the last 24 hours has noticed decreased output from her colostomy, chronic nausea without any recent change, no significant increased abdominal pain.  States at dialysis today, near end of session, needle got dislodged from fistula, about 300 cc of blood loss, states following this she became lightheaded/dizzy, blood pressure 60/30 and she was referred to the ED.  States with dialysis recently they have not been removing fluid due to hypotension.  States she does have home health at assisted living  facility.  In the ED blood pressure was low, 94/56, received fluid bolus, subsequently started on Levophed, during my encounter blood pressure 116/57 on Levophed 0.3 mcg/kg/min.  Patient is currently reporting back pain.  Labs with WBC 11.36, hemoglobin 8.6, platelet 162, potassium 1.9, magnesium 1.2, albumin 2.2, lactic acid 2.2, BUN/creatinine 5/2.4.  EKG reviewed.  Chest x-ray clear.  Previous echo with EF of 70%.  She received acetaminophen, magnesium, potassium, 472 cc fluid bolus as well as cefepime in the ED.  Case discussed with ED provider who requested admission.  Plan of care was discussed with patient, no visitors at bedside.  Address code status and patient confirms DNR.        Overview/Hospital Course:  Patient presented with worsening hypotension from outpatient dialysis center.  Recent complicated past medical history.  She received IV fluid and was started on Levophed.  CT abdomen and pelvis obtained with diffuse pneumatosis intestinal most prominent ascending, transverse and descending colon with portal vein gas.  She was started on broad-spectrum antibiotics.  General surgery was consulted and underwent emergent exploratory laparotomy with total colectomy, creation of ileostomy, inadvertent bladder injury requiring repair.  She was extubated postprocedure and transferred to the ICU.  Received 1 unit PRBC and FFP.  On 11/17 reports nausea, abdominal pain, still on Levophed.  11/18 still with significant abdominal pain, no output from ileostomy, receiving dialysis today, running even, still on Levophed, resuming home midodrine and attempting to wean off.  On 11/20, received unit of PRBC yesterday, encephalopathy persists, CT head negative encephalopathy labs ordered, Neurology was consulted, EEG was done, MRI ordered but could not be done due to agitation, due to patient bad prognosis, multiple comorbidities, after discusison with family, they decided to do comfort care only, and patient eventually  ".     Physical Exam  BP (!) 64/37 Comment: Pallitive care/DNR  Pulse (!) 0 Comment: MD notified  Temp 97.1 °F (36.2 °C)   Resp (!) 0   Ht 5' 7" (1.702 m)   Wt 47.2 kg (104 lb 0.9 oz)   SpO2 (!) 75% Comment: Pallitive care/DNR  BMI 16.30 kg/m²   Vitals reviewed.    Constitutional: No distress.   HENT: Atraumatic.   Cardiovascular: Normal rate, regular rhythm and normal heart sounds.   Pulmonary/Chest: Effort normal. Clear to auscultation bilaterally. No wheezes.   Abdominal: Soft. Bowel sounds are normal. Exhibits no distension and no mass. No tenderness  Neurological: Alert.   Skin: Skin is warm and dry.     Following labs were Reviewed   No results for input(s): WBC, HGB, HCT, PLT, GLUCOSE, CALCIUM, ALBUMIN, PROT, NA, K, CO2, CL, BUN, CREATININE, ALKPHOS, ALT, AST, BILITOT in the last 24 hours.  No results found for: POCTGLUCOSE       Microbiology Results (last 7 days)       Procedure Component Value Units Date/Time    Urine culture [[id number]]  (Abnormal) Collected: 22 1501    Order Status: Completed Specimen: Urine Updated: 22 1450     Urine Culture, Routine PRESUMPTIVE PATRICIA ALBICANS  50,000 - 99,999 cfu/ml      Narrative:      Specimen Source->Urine    Blood culture x two cultures. Draw prior to antibiotics. [[id number]] Collected: 22 1322    Order Status: Completed Specimen: Blood from Peripheral, Upper Arm, Right Updated: 22 1432     Blood Culture, Routine No growth after 5 days.    Narrative:      Aerobic and anaerobic    Blood culture x two cultures. Draw prior to antibiotics. [[id number]] Collected: 22 1313    Order Status: Completed Specimen: Blood from Peripheral, Wrist, Right Updated: 22 1432     Blood Culture, Routine No growth after 5 days.    Narrative:      Aerobic and anaerobic          X-Ray Abdomen AP 1 View   Final Result      X-Ray Chest AP Portable   Final Result      CT Head Without Contrast   Final Result      X-Ray Chest 1 View   Final " Result      CT Abdomen Pelvis  Without Contrast   Final Result      X-Ray Chest AP Portable   Final Result      FL Cystogram Minimum 3 Views (xpd) - Rad Performed    (Results Pending)       No results found for this or any previous visit.      Pending laboratory work/Tests to be performed/followed by the Primary care Physician:    The patient was discharged in the care of her parents//wife/family/caregiver, with discharge instructions were reviewed in written and verbal form. All pertinent questions were discussed and prescriptions were provided. The importance of making follow up appointments and compliance of medications has been stressed repeatedly. The patient will follow up in 1 week or sooner as needed with the PCP, and the patient is on board with the plan. Upon discharge, patient needs to be on following medications.    Discharge Medications:     Medication List        ASK your doctor about these medications      AURYXIA 210 mg iron Tab  Generic drug: ferric citrate     cholecalciferol (vitamin D3) 25 mcg (1,000 unit) capsule  Commonly known as: VITAMIN D3  Take 1 capsule (1,000 Units total) by mouth once daily.     cholestyramine 4 gram packet  Commonly known as: QUESTRAN     diltiaZEM 120 MG Cp24  Commonly known as: CARDIZEM CD  Take 1 capsule (120 mg total) by mouth once daily.     dronabinoL 2.5 MG capsule  Commonly known as: MARINOL     ergocalciferol 50,000 unit Cap  Commonly known as: ERGOCALCIFEROL     ferrous sulfate 325 mg (65 mg iron) Tab tablet  Commonly known as: FEOSOL     gabapentin 100 MG capsule  Commonly known as: NEURONTIN     hydrALAZINE 50 MG tablet  Commonly known as: APRESOLINE  Take 1 tablet (50 mg total) by mouth every 8 (eight) hours.     hydrOXYzine HCL 25 MG tablet  Commonly known as: ATARAX  Take 1 tablet (25 mg total) by mouth 3 (three) times daily as needed for Itching.     levothyroxine 88 MCG tablet  Commonly known as: SYNTHROID  TAKE 1 TABLET BEFORE BREAKFAST      LORazepam 0.5 MG tablet  Commonly known as: ATIVAN  Take 1 tablet (0.5 mg total) by mouth every evening.     midodrine 5 MG Tab  Commonly known as: PROAMATINE     mirtazapine 15 MG tablet  Commonly known as: REMERON     oxyCODONE 5 mg Cap  Commonly known as: OXY-IR     oxyCODONE-acetaminophen 5-325 mg per tablet  Commonly known as: PERCOCET  Take 1 tablet by mouth every 6 (six) hours as needed for Pain.     promethazine 25 MG tablet  Commonly known as: PHENERGAN  TAKE 1 TABLET (25 MG TOTAL) BY MOUTH 2 (TWO) TIMES DAILY AS NEEDED FOR NAUSEA.     rOPINIRole 0.5 MG tablet  Commonly known as: REQUIP     sertraline 50 MG tablet  Commonly known as: ZOLOFT  Take 1 tablet (50 mg total) by mouth every evening. Take 50 mg by mouth every evening.     sevelamer carbonate 800 mg Tab  Commonly known as: RENVELA     simethicone 80 MG chewable tablet  Commonly known as: MYLICON     traMADoL 50 mg tablet  Commonly known as: ULTRAM     zinc gluconate 50 mg tablet     zinc sulfate 50 mg zinc (220 mg) capsule  Commonly known as: ZINCATE                Carlitos Bowden  [facility] Hospitalist  11/27/2022

## 2022-12-05 NOTE — PHYSICIAN QUERY
PT Name: Shara Hutchins  MR #: [id number]    DOCUMENTATION CLARIFICATION     CDS/: Marti Busch               Contact information:[phone] OR THROUGH Epic MESSENGER  This form is a permanent document in the medical record.    Query Date: December 5, 2022      By submitting this query, we are merely seeking further clarification of documentation.  Please utilize your independent clinical judgment when addressing the question(s) below.    The Medical Record reflects the following:    Clinical Information Location in Medical Record   - continue septic shock management- doesn't need IVFs right now- can remove lee if ok with urology- dose antbx for CrCl < 10/HD- weaning pressors, stopped IVFs, can remove lee if ok with urology  Septic Shock      She re-presented with portal venous gas and concerns for pneumatosis of the remaining  colon and peritonitis on exam.  Patient was also in shock on high doses of Levophed.  I recommended exploration with probable total colectomy and end ileostomy despite the expectation that patient would have a small amount of small bowel left.  Patient  did consent to the planned procedure. Nephrology consult and progress notes          OR report/ Dr Broderick       Please clarify/confirm the Consultants diagnosis of Sepsis with Septic Shock:     [    X]          [    ] Diagnosis ruled in- SEPSIS WITH SEPTIC SHOCK (PRESENT ON ADMISSION)    Diagnosis ruled in- SEPSIS WITH SEPTIC SHOCK (NOT PRESENT ON ADMISSION)   [    ]          [    ] Diagnosis ruled in- SEPSIS WITHOUT SEPTIC SHOCK (PRESENT ON ADMISSION)    Diagnosis ruled in- SEPSIS WITHOUT SEPTIC SHOCK (NOT PRESENT ON ADMISSION)     [    ] DISAGREE WITH SEPSIS DIAGNOSIS

## 2023-03-19 NOTE — PLAN OF CARE
Progress note from 09/26/2022, chest xray, negative covid faxed to [facility]. Awaiting discharge orders from Dr. Diaz at this time. CM following       09/27/22 0831   Post-Acute Status   Post-Acute Authorization Placement       0905 - Discharge order, AVS, discharge summary faxed to Lizzette at [facility]. Secure chat sent to primary nurse and charge nurse requesting abd drain to be pulled so report can be called and transport set up.    4 = No assist / stand by assistance

## 2023-09-21 NOTE — PT/OT/SLP PROGRESS
Occupational Therapy   Treatment    Name: Shara Hutchins  MRN: [id number]  Admitting Diagnosis:  <principal problem not specified>       Recommendations:     Discharge Recommendations: LTACH (long-term acute care hospital)  Discharge Equipment Recommendations:  walker, rolling  Barriers to discharge:  Decreased caregiver support    Assessment:     Shara Hutchins is a 69 y.o. female with a medical diagnosis of <principal problem not specified>.  She presents with improving medical acuity, but struggles with OOB activity secondary to anxiety from O2 desaturation during activity. Performance deficits affecting function are weakness, impaired endurance, impaired self care skills, impaired functional mobilty, impaired cardiopulmonary response to activity.     Rehab Prognosis:  Fair; patient would benefit from acute skilled OT services to address these deficits and reach maximum level of function.       Plan:     Patient to be seen 5 x/week to address the above listed problems via self-care/home management, therapeutic activities, therapeutic exercises  · Plan of Care Expires:    · Plan of Care Reviewed with: patient    Subjective     Pain/Comfort:  · Pain Rating 1: 0/10  · Pain Rating Post-Intervention 1: 0/10    Objective:     Communicated with: nurse prior to session.  Patient found HOB elevated with telemetry, peripheral IV, oxygen, pulse ox (continuous), lee catheter upon OT entry to room.    General Precautions: Standard, fall   Orthopedic Precautions:N/A   Braces: N/A     Occupational Performance:     BUE/BLE Exercises:    10 repetitions of BUE/BLE exercises with standby assstance.  [facility] 6 Click ADL:      Treatment & Education:  Patient instructed to use PLB while performing BUE/BLE exercises. O2 Sats ranged from 84% to 92% during session.     Patient left HOB elevated with all lines intact and call button in reachEducation:      GOALS:   Multidisciplinary Problems     Occupational Therapy Goals        Problem:  Pt called in to change the time to Morning pt wife will have the day off so he can be in the morning now I did update the appt notes to reflect AM appt Occupational Therapy Goal    Goal Priority Disciplines Outcome Interventions   Occupational Therapy Goal     OT, PT/OT Ongoing, Progressing    Description:  Goals to be met by: discharge     Patient will increase functional independence with ADLs by performing:    UE Dressing with Set-up Assistance.  LE Dressing with Set-up Assistance.  Grooming while standing at sink with Supervision.  Upper extremity exercise program x20 reps per handout, with independence.                      Time Tracking:     OT Date of Treatment: 03/20/20  OT Start Time: 1331  OT Stop Time: 1346  OT Total Time (min): 15 min    Billable Minutes:Therapeutic Exercise 15    Dashawn Sutherland OT  3/20/2020

## 2024-08-01 NOTE — SUBJECTIVE & OBJECTIVE
Interval History: Pt has improved perianal pain with some diarrhea and abdominal pain. C diff EIA is negative    Review of Systems   Constitutional: Positive for diaphoresis and fatigue.   HENT: Negative.    Eyes: Negative.    Respiratory: Negative.    Cardiovascular: Negative.    Gastrointestinal: Positive for abdominal distention, abdominal pain, diarrhea and rectal pain. Negative for nausea and vomiting.   Endocrine: Positive for cold intolerance and heat intolerance.   Genitourinary: Negative.    Musculoskeletal: Positive for arthralgias and myalgias.   Skin: Positive for wound.        perianal   Allergic/Immunologic: Positive for immunocompromised state.   Neurological: Positive for weakness.   Hematological: Bruises/bleeds easily.   Psychiatric/Behavioral: Positive for decreased concentration and dysphoric mood. The patient is nervous/anxious.         Resolving with improved condition     Objective:     Vital Signs (Most Recent):  Temp: 98.1 °F (36.7 °C) (12/11/20 1020)  Pulse: 65 (12/11/20 1230)  Resp: 18 (12/11/20 1315)  BP: 131/69 (12/11/20 1230)  SpO2: 99 % (12/11/20 0749) Vital Signs (24h Range):  Temp:  [98 °F (36.7 °C)-98.4 °F (36.9 °C)] 98.1 °F (36.7 °C)  Pulse:  [63-73] 65  Resp:  [16-18] 18  SpO2:  [95 %-99 %] 99 %  BP: (119-157)/(62-78) 131/69     Weight: 60.4 kg (133 lb 2.5 oz)  Body mass index is 20.86 kg/m².  No intake or output data in the 24 hours ending 12/11/20 1625   Physical Exam  Vitals signs and nursing note reviewed. Exam conducted with a chaperone present.   Constitutional:       General: She is not in acute distress.     Appearance: She is ill-appearing.   HENT:      Head: Normocephalic and atraumatic.      Nose: Nose normal.      Mouth/Throat:      Mouth: Mucous membranes are moist.   Eyes:      Extraocular Movements: Extraocular movements intact.      Pupils: Pupils are equal, round, and reactive to light.   Neck:      Musculoskeletal: Normal range of motion and neck supple.    Cardiovascular:      Rate and Rhythm: Normal rate and regular rhythm.   Pulmonary:      Effort: Pulmonary effort is normal.      Breath sounds: Normal breath sounds.   Abdominal:      General: There is distension.      Tenderness: There is abdominal tenderness. There is no guarding or rebound.   Musculoskeletal: Normal range of motion.   Skin:     General: Skin is warm.   Neurological:      General: No focal deficit present.      Mental Status: She is alert and oriented to person, place, and time.   Psychiatric:         Mood and Affect: Mood normal.         Significant Labs:   Recent Lab Results       12/11/20  0511        Anion Gap 7     Baso # 0.01     Basophil % 0.2     BUN 17     Calcium 7.6     Chloride 114     CO2 19     Creatinine 2.7     Differential Method Automated     eGFR if African American 19.8     eGFR if non  17.2  Comment:  Calculation used to obtain the estimated glomerular filtration  rate (eGFR) is the CKD-EPI equation.        Eos # 0.2     Eosinophil % 3.1     Glucose 74     Gran # (ANC) 3.5     Gran % 67.5     Hematocrit 32.1     Hemoglobin 9.5     Immature Grans (Abs) 0.03  Comment:  Mild elevation in immature granulocytes is non specific and   can be seen in a variety of conditions including stress response,   acute inflammation, trauma and pregnancy. Correlation with other   laboratory and clinical findings is essential.       Immature Granulocytes 0.6     Lymph # 1.0     Lymph % 19.1     Magnesium 1.5     MCH 33.7     MCHC 29.6          Mono # 0.5     Mono % 9.5     MPV 10.5     nRBC 0     Platelets 138     Potassium 4.3     RBC 2.82     RDW 14.4     Sodium 140     WBC 5.14           Significant Imaging: I have reviewed all pertinent imaging results/findings within the past 24 hours.   no

## 2024-12-03 NOTE — NURSING
ED TO INPATIENT ADMISSION HANDOFF:  *Please review chart for additional information*    SPECIAL CONSIDERATIONS:     none    Chief Complaint   Patient presents with    Weakness        ED Triage Notes       Dale Neumann RN 12/3/2024 12:17                 Patient presents to ED for weakness that has been going on today, it started yesterday, but is worse today. Patient reports hitting her head on the freezer the other day. Patient also reports taking 600mg of seroquel last night and this morning, a total of 1200mg. Patient usually takes 300mg once a day, but since she had trouble sleeping last night and this morning, she double it each time. Patient told EMS she could not stand either.            DIAGNOSIS: UTI (urinary tract infection) [N39.0]       CODE STATUS:   Code Status: Review History     ALLERGIES:   Allergen Reactions    Influenza Vaccines Other (See Comments)    Duloxetine Hcl ANXIETY and Other (See Comments)     Patient states just had a bad reaction and would like not to take this medication    Valproic Acid Other (See Comments)     Twitching and hair loss    Ziprasidone DIZZINESS and Other (See Comments)     Patient did not give a reaction just stated was allergic  Hair loss, eye twitching  Dizzy, top heavy    Amoxicillin Other (See Comments)    Baclofen Other (See Comments)     Patient stated that her tongue was sticking out after taking this medication    Lactose   (Food Or Med) DIARRHEA and Other (See Comments)    Lithium ANXIETY        ISOLATION: No active isolations     INFECTION STATUS: No active infections    OTHER INFECTIONS THAT REQUIRE STANDARD PRECAUTIONS:  [x] N/A []  Bed Bugs []  HIV    [] Hepatitis []  Wound with possible infection and/or controlled drainage    ORIENTATION STATUS: AxOx4    AFFECT: [x] Calm & Cooperative   [] Other:     AMBULATION:    [] Independently  [] Standby assistance [] Cane   [x] Walker  [] Wheelchair  [] Bedridden   [] Crutches  [x] Unknown and./or unable to  [facility] No complaints from patient.   assess             FALL RISK: [x] Yes    [] No    ELIMINATION HABITS:   [] Bathroom  [] PureWick/External Catheter [] Bedpan    [x] Incontinent [] Bedside commode   [] Ramirez   [] Urinal   [] Unknown and/or unable to assess    TELEMETRY:  [x] Yes [] No    OXYGEN STATUS:     LINES, TUBES, DRAINS:  PIV ACCESS:   [] Inserted prior to ED arrival  [x] Inserted while in ED  [] N/A  RAMIREZ:    [] Placed upon ED arrival    [] Chronic   [] Chronic - replaced in ED  [x] N/A   CENTRAL VENOUS ACCESS:    [] Port    [] Accessed prior to ED arrival    [] Accessed in ED   [] PICC line   [] Midline   [x] N/A  LIMB ALERT:   [] Left [] Right  [x] N/A  BLOOD PRODUCTS INFUSING AT TIME OF TRANSFER TO FLOOR:    [] Yes [] No     SEPSIS BUNDLE:   [x] N/A    SEPSIS ITEMS COMPLETED IN ED:  [] Initial Lactic Acid  [] Antibiotics started [] Fluid bolus started [] Frequent vital signs  [] Blood Cultures x2 [] Fluid bolus finished [] Repeat Lactic Acid    **ED RN REQUIRED TO ATTEMPT VERBAL HANDOFF FOR PATIENTS RECEIVING SEPSIS BUNDLE**    LIVING SITUATION: Lives Alone    OTHER:  PREFERRED LANGUAGE: English  PRONOUN:    [] He/Him/His/Himself   [x] She/Her/Hers/Herself   [] They/Their/Theirs/Themself    Patient identified as High Risk Patient and Contraband Search performed due to the following circumstance(s):  [] Patient is suspected of or known to be under the influence.  [] When a patient is suspected or known to be in possession of any item identified as Contraband.  [] When a patient expresses suicidal or homicidal ideation or intent to self-harm.  [] When a patient has a history of self-injurious behavior.  [] When a patient has made a documented threat to a teammate.  [] Other Clinical Discretion:  Patient allowed to maintain possession of following belonging item, at the clinical discretion of this RN:     PETITION & CERTIFICATION:   [] Involuntary   [] Voluntary   [x] N/A    ABNORMAL/OUTSTANDING LABS:    Labs Reviewed   COMPREHENSIVE  METABOLIC PANEL - Abnormal; Notable for the following components:       Result Value    Potassium 2.8 (*)     Glucose 117 (*)     Bilirubin, Total 3.7 (*)     GOT/AST 70 (*)     Albumin 3.1 (*)     Protein, Total 5.8 (*)     All other components within normal limits   MAGNESIUM - Abnormal; Notable for the following components:    Magnesium 1.6 (*)     All other components within normal limits   DRUG SCREEN, URINE - Abnormal; Notable for the following components:    Benzodiazepines, Urine Positive (*)     All other components within normal limits    Narrative:     Drug screening is for medical purposes only.  This is a screening assay only and false-positive or false-negative results can occur. A definitive confirmation by LC/MS may be ordered to confirm clinically questionable results.   CBC WITH AUTOMATED DIFFERENTIAL (PERFORMABLE ONLY) - Abnormal; Notable for the following components:    RBC 3.59 (*)     HGB 11.5 (*)     HCT 34.6 (*)     All other components within normal limits    Narrative:     This is an appended report.  These results have been appended to a previously verified report.   PROCALCITONIN - Abnormal; Notable for the following components:    Procalcitonin 0.13 (*)     All other components within normal limits   PROTHROMBIN TIME (INR/PT) - Abnormal; Notable for the following components:    Protime- PT 14.6 (*)     All other components within normal limits   URINALYSIS & REFLEX MICROSCOPY WITH CULTURE IF INDICATED - Abnormal; Notable for the following components:    BILIRUBIN, URINALYSIS Positive (*)     PROTEIN, URINALYSIS 30 (*)     UROBILINOGEN, URINALYSIS 8.0 (*)     LEUKOCYTE ESTERASE, URINALYSIS Large (*)     LEUKOCYTES, URINALYSIS >100 (*)     BACTERIA, URINALYSIS Large (*)     HYALINE CASTS, URINALYSIS 11 to 25 (*)     All other components within normal limits   PARTIAL THROMBOPLASTIN TIME (PTT) - Normal    Narrative:     PTT  Therapeutic Range:  45-65 seconds.       TROPONIN I, HIGH  SENSITIVITY - Normal   CBC WITH DIFFERENTIAL    Narrative:     The following orders were created for panel order CBC with Automated Differential.  Procedure                               Abnormality         Status                     ---------                               -----------         ------                     CBC with Automated Dif...[[id number]]  Abnormal            Final result                 Please view results for these tests on the individual orders.   ALCOHOL    Narrative:     Alcohol screening is for medical purposes only. This is a screening assay only and false-positive or false-negative results can occur. A definitive confirmation by LC/MS may be ordered to confirm clinically questionable results.   RAINBOW DRAW    Narrative:     The following orders were created for panel order [place] Draw Light Blue Top Collected? 1 Label; Red Top Collected? No Labels; Light Green Top Collected? 1 Label; Lavender Top Collected? 1 Label; Gold Top Collected? 1 Label; Pink Top Collected? No Labels; Grey Top Collected? No Labels.  Procedure                               Abnormality         Status                     ---------                               -----------         ------                     Light Blue Top[[id number]]                                 In process                 Light Green Top[[id number]]                                In process                 Lavender Top[[id number]]                                   In process                 Gold Top[[id number]]                                       In process                   Please view results for these tests on the individual orders.   LIGHT BLUE TOP   LIGHT GREEN TOP   LAVENDER TOP   GOLD TOP   URINE, BACTERIAL CULTURE           Please call ED RN for additional questions, clarification, or sensitive information: SHERYL Zuluaga RN ([phone])

## (undated) DEVICE — DRAPE THYROID WITH ARMBOARD

## (undated) DEVICE — SUTURE SILK 2-0 SH 18 CR C012D

## (undated) DEVICE — NEEDLE SAFETY ECLIPSE 25G 1-1/2IN 305767

## (undated) DEVICE — BINDER 9 3-PANEL 30-45

## (undated) DEVICE — SYRINGE HYPODERMC LL 22G 1.5 ECLIPSE 30

## (undated) DEVICE — SOLUTION IRRI NS BOTTLE 1000ML R5200-01

## (undated) DEVICE — SPONGE LAP 18X18

## (undated) DEVICE — SYRINGE 10ML 302995

## (undated) DEVICE — DRAPE C-ARM (FITS NEW C-ARM) 07-CA108

## (undated) DEVICE — TROCAR OPTICAL ZTHREAD 5MMX100MM CTF03

## (undated) DEVICE — SOLUTION NACL 0.9% 3000ML

## (undated) DEVICE — SEE MEDLINE ITEM 152487

## (undated) DEVICE — SUTURE SILK 2-0 CR SH 30 C016D

## (undated) DEVICE — CONTAINER SPECIMEN STRL 4OZ

## (undated) DEVICE — GLOVE SURG ULTRA TOUCH 6

## (undated) DEVICE — SUTURE VICRYL 3-0 18 VCP774D

## (undated) DEVICE — SET IRR URLGY 2LINE UNIV SPIKE

## (undated) DEVICE — SUTURE ETHILON 2-0 PS 18 585H

## (undated) DEVICE — GUIDE WIRE MOTION .035 X 150CM

## (undated) DEVICE — LUBRICANT SURGILUBE 2 OZ

## (undated) DEVICE — GUIDEWIRE URO ANGLED 3CM TIP .035X150CM

## (undated) DEVICE — DRAPE UTILITY 89731

## (undated) DEVICE — SUTURE SILK 0 30 A306H

## (undated) DEVICE — SHEATH PINNACLE 6FRX10CM W/GUIDEWIRE

## (undated) DEVICE — SLEEVE SCD EXPRESS KNEE MEDIUM

## (undated) DEVICE — DRAPE UNDER BUTTOCKS W/SUCTION PORT

## (undated) DEVICE — DRAPE LAPAROTOMY TRANSVERSE 89281

## (undated) DEVICE — GLOVE SURG ULTRA TOUCH 7

## (undated) DEVICE — SHEATH FLEXOR ACCESS 12X35

## (undated) DEVICE — SYR ONLY LUER LOCK 20CC

## (undated) DEVICE — SCRUB BACTOSHIELD 134439

## (undated) DEVICE — SPONGE SUPER KERLIX 6X6.75IN

## (undated) DEVICE — CONTAINER SPECIMEN OR STER 4OZ

## (undated) DEVICE — DRAPE T THYROID W/ARMBOARD COVER

## (undated) DEVICE — GLOVE SURG ULTRA TOUCH 7.5

## (undated) DEVICE — PAD BOVIE ADULT

## (undated) DEVICE — SLEEVE TROCAR 5MMX100MM  CTS02

## (undated) DEVICE — CATHETER URETHRAL RED 16FR

## (undated) DEVICE — GLOVE BIOGEL SKINSENSE PI 8.0

## (undated) DEVICE — JELLY SURGILUBE LUBE TUBE 2OZ

## (undated) DEVICE — COVER LIGHT HANDLE LB53

## (undated) DEVICE — CATH POLLACK OPEN-END FLEXI-TI

## (undated) DEVICE — SUTURE VICRYL 4-0 18 VCP773D

## (undated) DEVICE — SOLUTION IRRI H2O BOTTLE 1000ML

## (undated) DEVICE — SOLUTION H2O IRRIGATION 3000ML R8006

## (undated) DEVICE — VALVE ENDOSCOPIC(SURESEAL II)

## (undated) DEVICE — SHEATH PINNACLE 7FRX10CM W/GUIDEWIRE

## (undated) DEVICE — SEE MEDLINE ITEM 157185

## (undated) DEVICE — SUTURE VICRYL 0 CT-1 27 VCP260H

## (undated) DEVICE — CONTAINER SPECIMEN 4 OZ STERILE 1053

## (undated) DEVICE — STAPLER SKIN NON ROTATE PXW35

## (undated) DEVICE — TOWEL OR BLUE      MDT2168284

## (undated) DEVICE — BAG LINGEMAN DRAIN UROLOGY

## (undated) DEVICE — TIP BOVIE TEFLON E1450X

## (undated) DEVICE — SUTURE PDS 1 TP-1 48 Z880G

## (undated) DEVICE — GUIDEWIRE AMPLATZ .035X145 STR

## (undated) DEVICE — SUTURE SILK 3-0 30 A304H

## (undated) DEVICE — SOL IRR NACL .9% 3000ML

## (undated) DEVICE — GLOVE BIOGEL PI ULTRA TOUCH GRAY SZ7.5

## (undated) DEVICE — DRAPE T CYSTOSCOPY STERILE

## (undated) DEVICE — DRAPE LAPAROTOMY 72X100X124 89228

## (undated) DEVICE — BAG OSTOMY SENSURA 3/8X3 TRANSPARNT

## (undated) DEVICE — PREP CHLORA 26ML

## (undated) DEVICE — GLOVE BIOGEL SKINSENSE PI 7.5

## (undated) DEVICE — Device

## (undated) DEVICE — SUTURE SILK 2-0 PS 18 1588H

## (undated) DEVICE — EXTRACTOR STONE 4WR NIT 2.2F 1

## (undated) DEVICE — PACK BASIC V 88151

## (undated) DEVICE — TRAY MINOR SLIDELL MEMORIAL HOSPITAL

## (undated) DEVICE — SUTURE SILK 3-0 CR SH 30 C017D

## (undated) DEVICE — STERISTRIP 1/2 R1547

## (undated) DEVICE — GUIDEWIRE CONTROL V-18 8X300CM

## (undated) DEVICE — CABLE MONOPOLAR 10FT DISPOSABLE

## (undated) DEVICE — BAG DECANTER 10-102

## (undated) DEVICE — SEE MEDLINE ITEM 157117

## (undated) DEVICE — PACK CYSTOSCOPY III

## (undated) DEVICE — GUIDE WIRE .018X175CM

## (undated) DEVICE — BANDAID LARGE 2X3 3/4 7539LF

## (undated) DEVICE — AGENT HEMOSTATIC ARISTA 3GR

## (undated) DEVICE — TIP BOVIE 6.5 0014

## (undated) DEVICE — SYRINGE 30ML 302832

## (undated) DEVICE — TRAY GENERAL SURGERY

## (undated) DEVICE — GOWN X-LARGE 044674

## (undated) DEVICE — FIBER LASER HOLMIUM 273 MICRON

## (undated) DEVICE — UNDERGLOVE BIOGEL PI MICRO BLUE SZ 8

## (undated) DEVICE — GLOVE BIOGELPI GOLD SIZE 7.5

## (undated) DEVICE — CATHETER URETERAL OPEN TIP 5FX70

## (undated) DEVICE — BASKET N-GAGE 2.2 FR/115CM

## (undated) DEVICE — GUIDEWIRE URO STRT FLEXIBLE TIP .035X150

## (undated) DEVICE — DRAPE WARMER ORS-100

## (undated) DEVICE — RELOAD PROXIMATE LINEAR CUTTER BLUE

## (undated) DEVICE — SPONGE KERLIX SUPER   NON25854

## (undated) DEVICE — SET MICPUNC ACC STIFF CANNULA

## (undated) DEVICE — DRESSING POST OP MEPILEX  AG 4X10

## (undated) DEVICE — TUBING INSUFFLATION 10FT

## (undated) DEVICE — SUTURE VICRYL 3-0 18 SH VCP864D

## (undated) DEVICE — RELOAD LINEAR TCR75

## (undated) DEVICE — DRAPE THYROID 89255

## (undated) DEVICE — AGENT HEMOSTATIC ARISTA 1GR

## (undated) DEVICE — SHEATH FLEXOR URET 10.7FRX35CM

## (undated) DEVICE — LIGASURE IMPACT INSTR 18CM  LF4418

## (undated) DEVICE — TUBING CYSTO DOUBLE 654301

## (undated) DEVICE — SUTURE SILK 3-0 SH 18 C013D

## (undated) DEVICE — GLOVE BIOGEL PI ULTRATOUCH G  GRAY SZ 6

## (undated) DEVICE — CUTTER LINEAR TLC75

## (undated) DEVICE — SCRUB HIBICLENS 4% CHG 4OZ

## (undated) DEVICE — GUIDEWIRE LAUREATE 035IN 180CM

## (undated) DEVICE — GOWN POLY REINF BRTH SLV XL

## (undated) DEVICE — SYRINGE 5ML 309646

## (undated) DEVICE — CATHETER FOLEY 2 WY LTX COUNCIL 5CC 16FR

## (undated) DEVICE — GLOVE BIOGEL SKINSENSE PI 7.0

## (undated) DEVICE — GOWN SMART LRG 044673

## (undated) DEVICE — SPONGE BULKEE II ABSRB 6X6.75

## (undated) DEVICE — SEE MEDLINE ITEM 157116

## (undated) DEVICE — SUTURE SILK 2-0 30 A305H

## (undated) DEVICE — SPONGE GAUZE 2S 4X4 STERILE NON21424

## (undated) DEVICE — BAG DRAINAGE 2000ML 154102

## (undated) DEVICE — BLADE SCALPEL #11 371111

## (undated) DEVICE — GOWN B1 X-LG X-LONG

## (undated) DEVICE — GOWN SURGICAL BASICS XL

## (undated) DEVICE — SOLUTION DURAPREP 8630

## (undated) DEVICE — SUTURE PDS II 2-0 CT-1 27 Z339H

## (undated) DEVICE — CATHETER FOLEY 20FR 30CC 3 WAY  665209IC

## (undated) DEVICE — CATH ANGIO BRAID BERENSTEIN 5F

## (undated) DEVICE — STAPLER TX TX60B

## (undated) DEVICE — GLOVE BIOGEL MICRO SURGEON PINK SZ 7.5

## (undated) DEVICE — CATH URTRL OPEN END STR TIP 5F

## (undated) DEVICE — CATHETER URETERAL WEDGE TIP 5F X 70CM

## (undated) DEVICE — WIRE SUPER STIFF AMPLATZ .035X145

## (undated) DEVICE — SUTURE PDS 1 TP-1 48 PDP880G

## (undated) DEVICE — GUIDEWIRE J TIP EXCHANGE FIXED CORE 260

## (undated) DEVICE — BAG DRAINAGE 4000ML 153509

## (undated) DEVICE — SYS LABEL CORRECT MED

## (undated) DEVICE — TRAY GENERAL LAPAROSCOPY

## (undated) DEVICE — GUIDEWIRE ANGLED GLIDE .035-150 GR3506

## (undated) DEVICE — TROCAR BALL. BLNT HASSON C0R47

## (undated) DEVICE — SYRINGE BULB ASEPTO 60CC 2OZ

## (undated) DEVICE — JELLY LUBRICATING TUBE 4OZ 4OZLUB

## (undated) DEVICE — LEGGING CLEAR POLY 2/PACK

## (undated) DEVICE — KIT PROBE COVER WITH GEL

## (undated) DEVICE — GUIDEWIRE URO STRGHT 3CM TIP.035X150CM

## (undated) DEVICE — GOWN POLY REINF X-LONG XL

## (undated) DEVICE — COVER TABLE 44X90 STERILE

## (undated) DEVICE — SUTURE VICRYL 3-0 SH 27 VCP416H

## (undated) DEVICE — GOWN SURGICAL BASIC LG

## (undated) DEVICE — JELLY LUBRICATING 5GR

## (undated) DEVICE — CHLORAPREP 10.5 ML APPLICATOR

## (undated) DEVICE — ELECTRO LOOP 24FR MEDIUM

## (undated) DEVICE — SEE MEDLINE ITEM 156918

## (undated) DEVICE — SUTURE ETHIBOND 0 MO-7 18 CX41D

## (undated) DEVICE — CUTTER LINEAR TLC55

## (undated) DEVICE — BOWL STERILE LARGE 32OZ

## (undated) DEVICE — SUTURE MONOCRYL 4-0 PS-2 27 MCP426H

## (undated) DEVICE — FIBER LASER OPTILITE MH 273

## (undated) DEVICE — SCRUB DYNA-HEX LIQ 4% CHG 4OZ

## (undated) DEVICE — INTRODUCER SUPER SHEATH 5FR 16035-05B

## (undated) DEVICE — APPLIER CLIP  5MM

## (undated) DEVICE — ADHESIVE MASTISOL VIAL 48/BX

## (undated) DEVICE — SUCTION POOLE 0035040

## (undated) DEVICE — DRESSING POST OP MEPILEX AG 4X6  498300

## (undated) DEVICE — DRESSING TELFA 3X8  1238

## (undated) DEVICE — CATH URETERAL DUAL LUMEN 10FR

## (undated) DEVICE — GLOVE BIOGEL MICRO SURGEON PINK SZ 7

## (undated) DEVICE — DRESSING TRANS 2X2 TEGADERM